# Patient Record
Sex: FEMALE | Race: WHITE | NOT HISPANIC OR LATINO
[De-identification: names, ages, dates, MRNs, and addresses within clinical notes are randomized per-mention and may not be internally consistent; named-entity substitution may affect disease eponyms.]

---

## 2015-10-28 RX ORDER — ISOSORBIDE MONONITRATE 60 MG/1
1 TABLET, EXTENDED RELEASE ORAL
Qty: 0 | Refills: 0 | DISCHARGE
Start: 2015-10-28

## 2018-04-11 ENCOUNTER — NON-APPOINTMENT (OUTPATIENT)
Age: 78
End: 2018-04-11

## 2018-04-11 ENCOUNTER — LABORATORY RESULT (OUTPATIENT)
Age: 78
End: 2018-04-11

## 2018-04-11 ENCOUNTER — APPOINTMENT (OUTPATIENT)
Dept: INTERNAL MEDICINE | Facility: CLINIC | Age: 78
End: 2018-04-11
Payer: MEDICARE

## 2018-04-11 VITALS
RESPIRATION RATE: 14 BRPM | TEMPERATURE: 97.5 F | HEART RATE: 70 BPM | WEIGHT: 207 LBS | DIASTOLIC BLOOD PRESSURE: 87 MMHG | OXYGEN SATURATION: 97 % | HEIGHT: 61 IN | SYSTOLIC BLOOD PRESSURE: 150 MMHG | BODY MASS INDEX: 39.08 KG/M2

## 2018-04-11 VITALS — DIASTOLIC BLOOD PRESSURE: 90 MMHG | SYSTOLIC BLOOD PRESSURE: 150 MMHG

## 2018-04-11 PROCEDURE — 93000 ELECTROCARDIOGRAM COMPLETE: CPT

## 2018-04-11 PROCEDURE — 99215 OFFICE O/P EST HI 40 MIN: CPT

## 2018-04-11 RX ORDER — IBUPROFEN 600 MG/1
600 TABLET ORAL
Qty: 90 | Refills: 0 | Status: COMPLETED | COMMUNITY
Start: 2018-02-15

## 2018-04-11 RX ORDER — SULFAMETHOXAZOLE AND TRIMETHOPRIM 800; 160 MG/1; MG/1
800-160 TABLET ORAL
Qty: 20 | Refills: 0 | Status: DISCONTINUED | COMMUNITY
Start: 2018-02-07

## 2018-04-11 RX ORDER — LEVALBUTEROL TARTRATE 45 UG/1
45 AEROSOL, METERED ORAL
Qty: 15 | Refills: 0 | Status: DISCONTINUED | COMMUNITY
Start: 2018-03-05

## 2018-04-11 RX ORDER — LEVOFLOXACIN 500 MG/1
500 TABLET, FILM COATED ORAL
Qty: 7 | Refills: 0 | Status: DISCONTINUED | COMMUNITY
Start: 2018-02-08

## 2018-04-11 RX ORDER — FAMOTIDINE 20 MG/1
20 TABLET, FILM COATED ORAL
Qty: 60 | Refills: 0 | Status: COMPLETED | COMMUNITY
Start: 2018-03-05

## 2018-04-11 RX ORDER — OXYCODONE AND ACETAMINOPHEN 7.5; 325 MG/1; MG/1
7.5-325 TABLET ORAL
Qty: 3 | Refills: 0 | Status: DISCONTINUED | COMMUNITY
Start: 2018-02-28

## 2018-04-11 RX ORDER — HYDRALAZINE HYDROCHLORIDE 50 MG/1
50 TABLET ORAL
Qty: 60 | Refills: 0 | Status: DISCONTINUED | COMMUNITY
Start: 2018-02-27

## 2018-04-11 RX ORDER — ALPRAZOLAM 0.25 MG/1
0.25 TABLET ORAL
Qty: 60 | Refills: 0 | Status: COMPLETED | COMMUNITY
Start: 2018-03-26

## 2018-04-11 RX ORDER — ACARBOSE 25 MG/1
25 TABLET ORAL
Qty: 90 | Refills: 0 | Status: COMPLETED | COMMUNITY
Start: 2018-03-26

## 2018-04-12 ENCOUNTER — APPOINTMENT (OUTPATIENT)
Dept: CARDIOLOGY | Facility: CLINIC | Age: 78
End: 2018-04-12
Payer: MEDICARE

## 2018-04-12 VITALS
HEIGHT: 61 IN | OXYGEN SATURATION: 96 % | WEIGHT: 206 LBS | HEART RATE: 79 BPM | DIASTOLIC BLOOD PRESSURE: 79 MMHG | RESPIRATION RATE: 14 BRPM | BODY MASS INDEX: 38.89 KG/M2 | SYSTOLIC BLOOD PRESSURE: 152 MMHG

## 2018-04-12 LAB
25(OH)D3 SERPL-MCNC: 48.7 NG/ML
ALBUMIN SERPL ELPH-MCNC: 3.9 G/DL
ALP BLD-CCNC: 61 U/L
ALT SERPL-CCNC: 18 U/L
ANION GAP SERPL CALC-SCNC: 17 MMOL/L
APPEARANCE: CLEAR
AST SERPL-CCNC: 21 U/L
BASOPHILS # BLD AUTO: 0.02 K/UL
BASOPHILS NFR BLD AUTO: 0.2 %
BILIRUB SERPL-MCNC: 0.2 MG/DL
BILIRUBIN URINE: NEGATIVE
BLOOD URINE: NEGATIVE
BUN SERPL-MCNC: 27 MG/DL
CALCIUM SERPL-MCNC: 9.8 MG/DL
CHLORIDE SERPL-SCNC: 101 MMOL/L
CHOLEST SERPL-MCNC: 158 MG/DL
CHOLEST/HDLC SERPL: 3.3 RATIO
CO2 SERPL-SCNC: 25 MMOL/L
COLOR: YELLOW
CREAT SERPL-MCNC: 1.85 MG/DL
CREAT SPEC-SCNC: 115 MG/DL
EOSINOPHIL # BLD AUTO: 0.05 K/UL
EOSINOPHIL NFR BLD AUTO: 0.6 %
FERRITIN SERPL-MCNC: 44 NG/ML
FOLATE SERPL-MCNC: 12.7 NG/ML
GLUCOSE QUALITATIVE U: NEGATIVE MG/DL
GLUCOSE SERPL-MCNC: 153 MG/DL
HBA1C MFR BLD HPLC: 8 %
HCT VFR BLD CALC: 39.7 %
HDLC SERPL-MCNC: 48 MG/DL
HGB BLD-MCNC: 11.9 G/DL
IMM GRANULOCYTES NFR BLD AUTO: 0.1 %
KETONES URINE: NEGATIVE
LDLC SERPL CALC-MCNC: 70 MG/DL
LEUKOCYTE ESTERASE URINE: NEGATIVE
LYMPHOCYTES # BLD AUTO: 1.46 K/UL
LYMPHOCYTES NFR BLD AUTO: 17.8 %
MAGNESIUM SERPL-MCNC: 2.1 MG/DL
MAN DIFF?: NORMAL
MCHC RBC-ENTMCNC: 30 GM/DL
MCHC RBC-ENTMCNC: 30.7 PG
MCV RBC AUTO: 102.3 FL
MICROALBUMIN 24H UR DL<=1MG/L-MCNC: 8.7 MG/DL
MICROALBUMIN/CREAT 24H UR-RTO: 76 MG/G
MONOCYTES # BLD AUTO: 0.74 K/UL
MONOCYTES NFR BLD AUTO: 9 %
NEUTROPHILS # BLD AUTO: 5.94 K/UL
NEUTROPHILS NFR BLD AUTO: 72.3 %
NITRITE URINE: NEGATIVE
PH URINE: 6
PLATELET # BLD AUTO: 334 K/UL
POTASSIUM SERPL-SCNC: 4.1 MMOL/L
PROT SERPL-MCNC: 7.4 G/DL
PROTEIN URINE: 30 MG/DL
RBC # BLD: 3.88 M/UL
RBC # FLD: 15.4 %
SODIUM SERPL-SCNC: 143 MMOL/L
SPECIFIC GRAVITY URINE: 1.02
TRIGL SERPL-MCNC: 200 MG/DL
TSH SERPL-ACNC: 1.67 UIU/ML
URATE SERPL-MCNC: 5.7 MG/DL
UROBILINOGEN URINE: NEGATIVE MG/DL
VIT B12 SERPL-MCNC: 767 PG/ML
WBC # FLD AUTO: 8.22 K/UL

## 2018-04-12 PROCEDURE — 99214 OFFICE O/P EST MOD 30 MIN: CPT | Mod: 25

## 2018-04-13 ENCOUNTER — NON-APPOINTMENT (OUTPATIENT)
Age: 78
End: 2018-04-13

## 2018-04-26 ENCOUNTER — NON-APPOINTMENT (OUTPATIENT)
Age: 78
End: 2018-04-26

## 2018-04-26 ENCOUNTER — APPOINTMENT (OUTPATIENT)
Dept: INTERNAL MEDICINE | Facility: CLINIC | Age: 78
End: 2018-04-26
Payer: MEDICARE

## 2018-04-26 ENCOUNTER — APPOINTMENT (OUTPATIENT)
Dept: CARDIOLOGY | Facility: CLINIC | Age: 78
End: 2018-04-26
Payer: MEDICARE

## 2018-04-26 VITALS
SYSTOLIC BLOOD PRESSURE: 128 MMHG | WEIGHT: 199 LBS | HEART RATE: 66 BPM | DIASTOLIC BLOOD PRESSURE: 73 MMHG | OXYGEN SATURATION: 96 % | TEMPERATURE: 97.8 F | HEIGHT: 61 IN | BODY MASS INDEX: 37.57 KG/M2 | RESPIRATION RATE: 13 BRPM

## 2018-04-26 DIAGNOSIS — Z87.09 PERSONAL HISTORY OF OTHER DISEASES OF THE RESPIRATORY SYSTEM: ICD-10-CM

## 2018-04-26 DIAGNOSIS — J22 UNSPECIFIED ACUTE LOWER RESPIRATORY INFECTION: ICD-10-CM

## 2018-04-26 PROCEDURE — 93000 ELECTROCARDIOGRAM COMPLETE: CPT

## 2018-04-26 PROCEDURE — 94640 AIRWAY INHALATION TREATMENT: CPT

## 2018-04-26 PROCEDURE — 99215 OFFICE O/P EST HI 40 MIN: CPT | Mod: 25

## 2018-04-26 PROCEDURE — 99214 OFFICE O/P EST MOD 30 MIN: CPT | Mod: 25

## 2018-04-26 RX ORDER — NEBULIZER ACCESSORIES
KIT MISCELLANEOUS
Qty: 1 | Refills: 0 | Status: ACTIVE | COMMUNITY
Start: 2018-04-26 | End: 1900-01-01

## 2018-04-27 LAB — RAPID RVP RESULT: NOT DETECTED

## 2018-05-11 ENCOUNTER — APPOINTMENT (OUTPATIENT)
Dept: INTERNAL MEDICINE | Facility: CLINIC | Age: 78
End: 2018-05-11
Payer: MEDICARE

## 2018-05-11 VITALS
SYSTOLIC BLOOD PRESSURE: 162 MMHG | BODY MASS INDEX: 38.51 KG/M2 | WEIGHT: 204 LBS | HEIGHT: 61 IN | TEMPERATURE: 97.8 F | DIASTOLIC BLOOD PRESSURE: 84 MMHG | RESPIRATION RATE: 14 BRPM | OXYGEN SATURATION: 94 % | HEART RATE: 65 BPM

## 2018-05-11 VITALS — DIASTOLIC BLOOD PRESSURE: 85 MMHG | SYSTOLIC BLOOD PRESSURE: 160 MMHG

## 2018-05-11 PROCEDURE — 99215 OFFICE O/P EST HI 40 MIN: CPT

## 2018-05-11 RX ORDER — IBUPROFEN 400 MG/1
400 TABLET ORAL
Qty: 60 | Refills: 0 | Status: DISCONTINUED | COMMUNITY
Start: 2018-05-02

## 2018-05-11 RX ORDER — LEVOFLOXACIN 500 MG/1
500 TABLET, FILM COATED ORAL DAILY
Qty: 7 | Refills: 1 | Status: DISCONTINUED | COMMUNITY
Start: 2018-04-26 | End: 2018-05-11

## 2018-05-24 ENCOUNTER — APPOINTMENT (OUTPATIENT)
Dept: CARDIOLOGY | Facility: CLINIC | Age: 78
End: 2018-05-24
Payer: MEDICARE

## 2018-05-24 ENCOUNTER — APPOINTMENT (OUTPATIENT)
Dept: INTERNAL MEDICINE | Facility: CLINIC | Age: 78
End: 2018-05-24
Payer: MEDICARE

## 2018-05-24 ENCOUNTER — NON-APPOINTMENT (OUTPATIENT)
Age: 78
End: 2018-05-24

## 2018-05-24 VITALS
DIASTOLIC BLOOD PRESSURE: 74 MMHG | HEART RATE: 58 BPM | WEIGHT: 204 LBS | SYSTOLIC BLOOD PRESSURE: 124 MMHG | RESPIRATION RATE: 13 BRPM | OXYGEN SATURATION: 93 % | TEMPERATURE: 98.2 F | BODY MASS INDEX: 38.51 KG/M2 | HEIGHT: 61 IN

## 2018-05-24 DIAGNOSIS — M17.10 UNILATERAL PRIMARY OSTEOARTHRITIS, UNSPECIFIED KNEE: ICD-10-CM

## 2018-05-24 PROCEDURE — 93000 ELECTROCARDIOGRAM COMPLETE: CPT

## 2018-05-24 PROCEDURE — 99214 OFFICE O/P EST MOD 30 MIN: CPT | Mod: 25

## 2018-05-24 PROCEDURE — 93306 TTE W/DOPPLER COMPLETE: CPT

## 2018-05-24 PROCEDURE — 99215 OFFICE O/P EST HI 40 MIN: CPT

## 2018-05-24 RX ORDER — ISOSORBIDE MONONITRATE 30 MG/1
30 TABLET, EXTENDED RELEASE ORAL
Qty: 30 | Refills: 0 | Status: DISCONTINUED | COMMUNITY
Start: 2018-04-17

## 2018-05-24 NOTE — REVIEW OF SYSTEMS
[Joint Pain] : joint pain [Negative] : Heme/Lymph [FreeTextEntry9] : S/P RIGHT TKR, SWELLING, WARMTH

## 2018-05-27 ENCOUNTER — NON-APPOINTMENT (OUTPATIENT)
Age: 78
End: 2018-05-27

## 2018-05-27 LAB
ANION GAP SERPL CALC-SCNC: 16 MMOL/L
BUN SERPL-MCNC: 42 MG/DL
CALCIUM SERPL-MCNC: 9.6 MG/DL
CHLORIDE SERPL-SCNC: 100 MMOL/L
CO2 SERPL-SCNC: 25 MMOL/L
CREAT SERPL-MCNC: 2.03 MG/DL
GLUCOSE SERPL-MCNC: 139 MG/DL
HBA1C MFR BLD HPLC: 7 %
MAGNESIUM SERPL-MCNC: 2.1 MG/DL
POTASSIUM SERPL-SCNC: 3.4 MMOL/L
SODIUM SERPL-SCNC: 141 MMOL/L
URATE SERPL-MCNC: 9.1 MG/DL

## 2018-06-12 LAB
BASOPHILS # BLD AUTO: 0.01 K/UL
BASOPHILS NFR BLD AUTO: 0.1 %
EOSINOPHIL # BLD AUTO: 0.09 K/UL
EOSINOPHIL NFR BLD AUTO: 1 %
HCT VFR BLD CALC: 35.3 %
HGB BLD-MCNC: 11 G/DL
IMM GRANULOCYTES NFR BLD AUTO: 0.1 %
LYMPHOCYTES # BLD AUTO: 1.64 K/UL
LYMPHOCYTES NFR BLD AUTO: 18.1 %
MAN DIFF?: NORMAL
MCHC RBC-ENTMCNC: 30 PG
MCHC RBC-ENTMCNC: 31.2 GM/DL
MCV RBC AUTO: 96.2 FL
MONOCYTES # BLD AUTO: 0.52 K/UL
MONOCYTES NFR BLD AUTO: 5.7 %
NEUTROPHILS # BLD AUTO: 6.79 K/UL
NEUTROPHILS NFR BLD AUTO: 75 %
PLATELET # BLD AUTO: 283 K/UL
RBC # BLD: 3.67 M/UL
RBC # FLD: 16 %
WBC # FLD AUTO: 9.06 K/UL

## 2018-07-09 ENCOUNTER — APPOINTMENT (OUTPATIENT)
Dept: INTERNAL MEDICINE | Facility: CLINIC | Age: 78
End: 2018-07-09
Payer: MEDICARE

## 2018-07-09 VITALS
RESPIRATION RATE: 13 BRPM | HEIGHT: 61 IN | OXYGEN SATURATION: 93 % | DIASTOLIC BLOOD PRESSURE: 69 MMHG | HEART RATE: 65 BPM | SYSTOLIC BLOOD PRESSURE: 170 MMHG | TEMPERATURE: 98.2 F

## 2018-07-09 VITALS — DIASTOLIC BLOOD PRESSURE: 70 MMHG | SYSTOLIC BLOOD PRESSURE: 145 MMHG

## 2018-07-09 PROCEDURE — 99215 OFFICE O/P EST HI 40 MIN: CPT

## 2018-07-09 NOTE — REVIEW OF SYSTEMS
[Joint Pain] : joint pain [Joint Stiffness] : joint stiffness [Unsteady Walking] : ataxia [Negative] : Heme/Lymph [Joint Swelling] : no joint swelling [Muscle Weakness] : no muscle weakness [Muscle Pain] : no muscle pain [Back Pain] : no back pain

## 2018-07-09 NOTE — PHYSICAL EXAM

## 2018-07-18 ENCOUNTER — MEDICATION RENEWAL (OUTPATIENT)
Age: 78
End: 2018-07-18

## 2018-07-19 ENCOUNTER — MEDICATION RENEWAL (OUTPATIENT)
Age: 78
End: 2018-07-19

## 2018-07-23 ENCOUNTER — RX RENEWAL (OUTPATIENT)
Age: 78
End: 2018-07-23

## 2018-07-28 ENCOUNTER — APPOINTMENT (OUTPATIENT)
Dept: ORTHOPEDIC SURGERY | Facility: CLINIC | Age: 78
End: 2018-07-28
Payer: MEDICARE

## 2018-07-28 ENCOUNTER — RX RENEWAL (OUTPATIENT)
Age: 78
End: 2018-07-28

## 2018-07-28 VITALS
HEIGHT: 61 IN | DIASTOLIC BLOOD PRESSURE: 82 MMHG | SYSTOLIC BLOOD PRESSURE: 181 MMHG | BODY MASS INDEX: 37.76 KG/M2 | WEIGHT: 200 LBS | HEART RATE: 64 BPM

## 2018-07-28 PROCEDURE — 99213 OFFICE O/P EST LOW 20 MIN: CPT

## 2018-07-28 PROCEDURE — 73502 X-RAY EXAM HIP UNI 2-3 VIEWS: CPT

## 2018-07-28 PROCEDURE — 72110 X-RAY EXAM L-2 SPINE 4/>VWS: CPT

## 2018-08-03 ENCOUNTER — RX RENEWAL (OUTPATIENT)
Age: 78
End: 2018-08-03

## 2018-08-08 ENCOUNTER — RX RENEWAL (OUTPATIENT)
Age: 78
End: 2018-08-08

## 2018-09-12 ENCOUNTER — NON-APPOINTMENT (OUTPATIENT)
Age: 78
End: 2018-09-12

## 2018-09-12 ENCOUNTER — APPOINTMENT (OUTPATIENT)
Dept: CARDIOLOGY | Facility: CLINIC | Age: 78
End: 2018-09-12
Payer: MEDICARE

## 2018-09-12 ENCOUNTER — APPOINTMENT (OUTPATIENT)
Dept: INTERNAL MEDICINE | Facility: CLINIC | Age: 78
End: 2018-09-12
Payer: MEDICARE

## 2018-09-12 VITALS
BODY MASS INDEX: 37 KG/M2 | WEIGHT: 196 LBS | HEART RATE: 68 BPM | DIASTOLIC BLOOD PRESSURE: 80 MMHG | TEMPERATURE: 98.3 F | HEIGHT: 61 IN | OXYGEN SATURATION: 95 % | SYSTOLIC BLOOD PRESSURE: 161 MMHG | RESPIRATION RATE: 14 BRPM

## 2018-09-12 VITALS — SYSTOLIC BLOOD PRESSURE: 132 MMHG | HEART RATE: 66 BPM | DIASTOLIC BLOOD PRESSURE: 64 MMHG

## 2018-09-12 PROCEDURE — 99214 OFFICE O/P EST MOD 30 MIN: CPT | Mod: 25

## 2018-09-12 PROCEDURE — G0008: CPT

## 2018-09-12 PROCEDURE — 93000 ELECTROCARDIOGRAM COMPLETE: CPT

## 2018-09-12 PROCEDURE — 90686 IIV4 VACC NO PRSV 0.5 ML IM: CPT

## 2018-09-12 NOTE — REVIEW OF SYSTEMS
[Joint Pain] : joint pain [Joint Stiffness] : joint stiffness [Unsteady Walking] : ataxia [Negative] : Heme/Lymph [Joint Swelling] : no joint swelling [Muscle Pain] : no muscle pain [Back Pain] : no back pain [Headache] : no headache [Dizziness] : no dizziness [Fainting] : no fainting [Confusion] : no confusion [Memory Loss] : no memory loss

## 2018-09-12 NOTE — PHYSICAL EXAM

## 2018-09-13 LAB
ALBUMIN SERPL ELPH-MCNC: 4.3 G/DL
ALP BLD-CCNC: 62 U/L
ALT SERPL-CCNC: 12 U/L
ANION GAP SERPL CALC-SCNC: 15 MMOL/L
AST SERPL-CCNC: 21 U/L
BASOPHILS # BLD AUTO: 0.01 K/UL
BASOPHILS NFR BLD AUTO: 0.1 %
BILIRUB SERPL-MCNC: 0.4 MG/DL
BUN SERPL-MCNC: 26 MG/DL
CALCIUM SERPL-MCNC: 9.9 MG/DL
CHLORIDE SERPL-SCNC: 98 MMOL/L
CHOLEST SERPL-MCNC: 152 MG/DL
CHOLEST/HDLC SERPL: 2.9 RATIO
CO2 SERPL-SCNC: 28 MMOL/L
CREAT SERPL-MCNC: 1.67 MG/DL
EOSINOPHIL # BLD AUTO: 0.07 K/UL
EOSINOPHIL NFR BLD AUTO: 0.7 %
GLUCOSE SERPL-MCNC: 148 MG/DL
HBA1C MFR BLD HPLC: 6.7 %
HCT VFR BLD CALC: 40.8 %
HDLC SERPL-MCNC: 53 MG/DL
HGB BLD-MCNC: 12.4 G/DL
IMM GRANULOCYTES NFR BLD AUTO: 0.2 %
LDLC SERPL CALC-MCNC: 69 MG/DL
LYMPHOCYTES # BLD AUTO: 1.57 K/UL
LYMPHOCYTES NFR BLD AUTO: 16 %
MAGNESIUM SERPL-MCNC: 2.1 MG/DL
MAN DIFF?: NORMAL
MCHC RBC-ENTMCNC: 28.6 PG
MCHC RBC-ENTMCNC: 30.4 GM/DL
MCV RBC AUTO: 94.2 FL
MONOCYTES # BLD AUTO: 0.59 K/UL
MONOCYTES NFR BLD AUTO: 6 %
NEUTROPHILS # BLD AUTO: 7.53 K/UL
NEUTROPHILS NFR BLD AUTO: 77 %
PLATELET # BLD AUTO: 344 K/UL
POTASSIUM SERPL-SCNC: 3.4 MMOL/L
PROT SERPL-MCNC: 8.1 G/DL
RBC # BLD: 4.33 M/UL
RBC # FLD: 15.8 %
SODIUM SERPL-SCNC: 141 MMOL/L
TRIGL SERPL-MCNC: 151 MG/DL
TSH SERPL-ACNC: 1.05 UIU/ML
URATE SERPL-MCNC: 7.1 MG/DL
WBC # FLD AUTO: 9.79 K/UL

## 2018-10-28 ENCOUNTER — RX RENEWAL (OUTPATIENT)
Age: 78
End: 2018-10-28

## 2018-11-26 ENCOUNTER — MEDICATION RENEWAL (OUTPATIENT)
Age: 78
End: 2018-11-26

## 2018-12-08 ENCOUNTER — INPATIENT (INPATIENT)
Facility: HOSPITAL | Age: 78
LOS: 3 days | Discharge: NURSING, REHAB OR LONG-TERM CARE | DRG: 291 | End: 2018-12-12
Attending: HOSPITALIST | Admitting: HOSPITALIST
Payer: MEDICARE

## 2018-12-08 VITALS
HEIGHT: 61 IN | RESPIRATION RATE: 20 BRPM | OXYGEN SATURATION: 97 % | WEIGHT: 194.01 LBS | TEMPERATURE: 99 F | SYSTOLIC BLOOD PRESSURE: 149 MMHG | HEART RATE: 60 BPM | DIASTOLIC BLOOD PRESSURE: 56 MMHG

## 2018-12-08 DIAGNOSIS — I50.9 HEART FAILURE, UNSPECIFIED: ICD-10-CM

## 2018-12-08 DIAGNOSIS — Z93.2 ILEOSTOMY STATUS: Chronic | ICD-10-CM

## 2018-12-08 DIAGNOSIS — N18.9 CHRONIC KIDNEY DISEASE, UNSPECIFIED: ICD-10-CM

## 2018-12-08 DIAGNOSIS — E11.9 TYPE 2 DIABETES MELLITUS WITHOUT COMPLICATIONS: ICD-10-CM

## 2018-12-08 DIAGNOSIS — Z29.9 ENCOUNTER FOR PROPHYLACTIC MEASURES, UNSPECIFIED: ICD-10-CM

## 2018-12-08 DIAGNOSIS — Z98.89 OTHER SPECIFIED POSTPROCEDURAL STATES: Chronic | ICD-10-CM

## 2018-12-08 DIAGNOSIS — N18.3 CHRONIC KIDNEY DISEASE, STAGE 3 (MODERATE): ICD-10-CM

## 2018-12-08 DIAGNOSIS — I50.33 ACUTE ON CHRONIC DIASTOLIC (CONGESTIVE) HEART FAILURE: ICD-10-CM

## 2018-12-08 DIAGNOSIS — Z90.11 ACQUIRED ABSENCE OF RIGHT BREAST AND NIPPLE: Chronic | ICD-10-CM

## 2018-12-08 DIAGNOSIS — I10 ESSENTIAL (PRIMARY) HYPERTENSION: ICD-10-CM

## 2018-12-08 DIAGNOSIS — M25.559 PAIN IN UNSPECIFIED HIP: ICD-10-CM

## 2018-12-08 LAB
ALBUMIN SERPL ELPH-MCNC: 3.7 G/DL — SIGNIFICANT CHANGE UP (ref 3.3–5)
ALP SERPL-CCNC: 63 U/L — SIGNIFICANT CHANGE UP (ref 40–120)
ALT FLD-CCNC: 12 U/L — SIGNIFICANT CHANGE UP (ref 10–45)
ANION GAP SERPL CALC-SCNC: 12 MMOL/L — SIGNIFICANT CHANGE UP (ref 5–17)
APTT BLD: 28.5 SEC — SIGNIFICANT CHANGE UP (ref 27.5–36.3)
AST SERPL-CCNC: 28 U/L — SIGNIFICANT CHANGE UP (ref 10–40)
BASE EXCESS BLDV CALC-SCNC: 4.7 MMOL/L — HIGH (ref -2–2)
BASOPHILS # BLD AUTO: 0 K/UL — SIGNIFICANT CHANGE UP (ref 0–0.2)
BASOPHILS NFR BLD AUTO: 0.1 % — SIGNIFICANT CHANGE UP (ref 0–2)
BILIRUB SERPL-MCNC: 0.3 MG/DL — SIGNIFICANT CHANGE UP (ref 0.2–1.2)
BUN SERPL-MCNC: 30 MG/DL — HIGH (ref 7–23)
CA-I SERPL-SCNC: 1.1 MMOL/L — LOW (ref 1.12–1.3)
CALCIUM SERPL-MCNC: 9.6 MG/DL — SIGNIFICANT CHANGE UP (ref 8.4–10.5)
CHLORIDE BLDV-SCNC: 103 MMOL/L — SIGNIFICANT CHANGE UP (ref 96–108)
CHLORIDE SERPL-SCNC: 98 MMOL/L — SIGNIFICANT CHANGE UP (ref 96–108)
CO2 BLDV-SCNC: 31 MMOL/L — HIGH (ref 22–30)
CO2 SERPL-SCNC: 24 MMOL/L — SIGNIFICANT CHANGE UP (ref 22–31)
CREAT SERPL-MCNC: 1.54 MG/DL — HIGH (ref 0.5–1.3)
EOSINOPHIL # BLD AUTO: 0.1 K/UL — SIGNIFICANT CHANGE UP (ref 0–0.5)
EOSINOPHIL NFR BLD AUTO: 0.7 % — SIGNIFICANT CHANGE UP (ref 0–6)
GAS PNL BLDV: 133 MMOL/L — LOW (ref 136–145)
GAS PNL BLDV: SIGNIFICANT CHANGE UP
GLUCOSE BLDC GLUCOMTR-MCNC: 224 MG/DL — HIGH (ref 70–99)
GLUCOSE BLDV-MCNC: 163 MG/DL — HIGH (ref 70–99)
GLUCOSE SERPL-MCNC: 176 MG/DL — HIGH (ref 70–99)
HCO3 BLDV-SCNC: 30 MMOL/L — HIGH (ref 21–29)
HCT VFR BLD CALC: 40.3 % — SIGNIFICANT CHANGE UP (ref 34.5–45)
HCT VFR BLDA CALC: 38 % — LOW (ref 39–50)
HGB BLD CALC-MCNC: 12.3 G/DL — SIGNIFICANT CHANGE UP (ref 11.5–15.5)
HGB BLD-MCNC: 12.8 G/DL — SIGNIFICANT CHANGE UP (ref 11.5–15.5)
INR BLD: 0.94 RATIO — SIGNIFICANT CHANGE UP (ref 0.88–1.16)
LACTATE BLDV-MCNC: 1.7 MMOL/L — SIGNIFICANT CHANGE UP (ref 0.7–2)
LYMPHOCYTES # BLD AUTO: 1.2 K/UL — SIGNIFICANT CHANGE UP (ref 1–3.3)
LYMPHOCYTES # BLD AUTO: 12.3 % — LOW (ref 13–44)
MCHC RBC-ENTMCNC: 29.3 PG — SIGNIFICANT CHANGE UP (ref 27–34)
MCHC RBC-ENTMCNC: 31.6 GM/DL — LOW (ref 32–36)
MCV RBC AUTO: 92.6 FL — SIGNIFICANT CHANGE UP (ref 80–100)
MONOCYTES # BLD AUTO: 0.9 K/UL — SIGNIFICANT CHANGE UP (ref 0–0.9)
MONOCYTES NFR BLD AUTO: 9.5 % — SIGNIFICANT CHANGE UP (ref 2–14)
NEUTROPHILS # BLD AUTO: 7.3 K/UL — SIGNIFICANT CHANGE UP (ref 1.8–7.4)
NEUTROPHILS NFR BLD AUTO: 77.5 % — HIGH (ref 43–77)
NT-PROBNP SERPL-SCNC: 1584 PG/ML — HIGH (ref 0–300)
PCO2 BLDV: 48 MMHG — SIGNIFICANT CHANGE UP (ref 35–50)
PH BLDV: 7.41 — SIGNIFICANT CHANGE UP (ref 7.35–7.45)
PLATELET # BLD AUTO: 297 K/UL — SIGNIFICANT CHANGE UP (ref 150–400)
PO2 BLDV: 35 MMHG — SIGNIFICANT CHANGE UP (ref 25–45)
POTASSIUM BLDV-SCNC: 7 MMOL/L — CRITICAL HIGH (ref 3.5–5.3)
POTASSIUM SERPL-MCNC: 5 MMOL/L — SIGNIFICANT CHANGE UP (ref 3.5–5.3)
POTASSIUM SERPL-SCNC: 5 MMOL/L — SIGNIFICANT CHANGE UP (ref 3.5–5.3)
PROT SERPL-MCNC: 7.4 G/DL — SIGNIFICANT CHANGE UP (ref 6–8.3)
PROTHROM AB SERPL-ACNC: 10.8 SEC — SIGNIFICANT CHANGE UP (ref 10–12.9)
RBC # BLD: 4.36 M/UL — SIGNIFICANT CHANGE UP (ref 3.8–5.2)
RBC # FLD: 14.5 % — SIGNIFICANT CHANGE UP (ref 10.3–14.5)
SAO2 % BLDV: 63 % — LOW (ref 67–88)
SODIUM SERPL-SCNC: 134 MMOL/L — LOW (ref 135–145)
WBC # BLD: 9.4 K/UL — SIGNIFICANT CHANGE UP (ref 3.8–10.5)
WBC # FLD AUTO: 9.4 K/UL — SIGNIFICANT CHANGE UP (ref 3.8–10.5)

## 2018-12-08 PROCEDURE — 93010 ELECTROCARDIOGRAM REPORT: CPT

## 2018-12-08 PROCEDURE — 99223 1ST HOSP IP/OBS HIGH 75: CPT | Mod: GC

## 2018-12-08 PROCEDURE — 71045 X-RAY EXAM CHEST 1 VIEW: CPT | Mod: 26

## 2018-12-08 PROCEDURE — 99284 EMERGENCY DEPT VISIT MOD MDM: CPT | Mod: 25

## 2018-12-08 RX ORDER — DEXTROSE 50 % IN WATER 50 %
15 SYRINGE (ML) INTRAVENOUS ONCE
Qty: 0 | Refills: 0 | Status: DISCONTINUED | OUTPATIENT
Start: 2018-12-08 | End: 2018-12-09

## 2018-12-08 RX ORDER — INSULIN LISPRO 100/ML
VIAL (ML) SUBCUTANEOUS AT BEDTIME
Qty: 0 | Refills: 0 | Status: DISCONTINUED | OUTPATIENT
Start: 2018-12-08 | End: 2018-12-12

## 2018-12-08 RX ORDER — HEPARIN SODIUM 5000 [USP'U]/ML
5000 INJECTION INTRAVENOUS; SUBCUTANEOUS EVERY 8 HOURS
Qty: 0 | Refills: 0 | Status: DISCONTINUED | OUTPATIENT
Start: 2018-12-08 | End: 2018-12-12

## 2018-12-08 RX ORDER — DEXTROSE 50 % IN WATER 50 %
12.5 SYRINGE (ML) INTRAVENOUS ONCE
Qty: 0 | Refills: 0 | Status: DISCONTINUED | OUTPATIENT
Start: 2018-12-08 | End: 2018-12-09

## 2018-12-08 RX ORDER — SENNA PLUS 8.6 MG/1
2 TABLET ORAL AT BEDTIME
Qty: 0 | Refills: 0 | Status: DISCONTINUED | OUTPATIENT
Start: 2018-12-08 | End: 2018-12-12

## 2018-12-08 RX ORDER — DOCUSATE SODIUM 100 MG
100 CAPSULE ORAL THREE TIMES A DAY
Qty: 0 | Refills: 0 | Status: DISCONTINUED | OUTPATIENT
Start: 2018-12-08 | End: 2018-12-12

## 2018-12-08 RX ORDER — FUROSEMIDE 40 MG
20 TABLET ORAL EVERY 12 HOURS
Qty: 0 | Refills: 0 | Status: DISCONTINUED | OUTPATIENT
Start: 2018-12-08 | End: 2018-12-10

## 2018-12-08 RX ORDER — OXYCODONE HYDROCHLORIDE 5 MG/1
10 TABLET ORAL EVERY 6 HOURS
Qty: 0 | Refills: 0 | Status: DISCONTINUED | OUTPATIENT
Start: 2018-12-08 | End: 2018-12-12

## 2018-12-08 RX ORDER — INSULIN LISPRO 100/ML
VIAL (ML) SUBCUTANEOUS
Qty: 0 | Refills: 0 | Status: DISCONTINUED | OUTPATIENT
Start: 2018-12-08 | End: 2018-12-12

## 2018-12-08 RX ORDER — GABAPENTIN 400 MG/1
100 CAPSULE ORAL
Qty: 0 | Refills: 0 | Status: DISCONTINUED | OUTPATIENT
Start: 2018-12-08 | End: 2018-12-12

## 2018-12-08 RX ORDER — SODIUM CHLORIDE 9 MG/ML
1000 INJECTION, SOLUTION INTRAVENOUS
Qty: 0 | Refills: 0 | Status: DISCONTINUED | OUTPATIENT
Start: 2018-12-08 | End: 2018-12-09

## 2018-12-08 RX ORDER — GLUCAGON INJECTION, SOLUTION 0.5 MG/.1ML
1 INJECTION, SOLUTION SUBCUTANEOUS ONCE
Qty: 0 | Refills: 0 | Status: DISCONTINUED | OUTPATIENT
Start: 2018-12-08 | End: 2018-12-09

## 2018-12-08 RX ORDER — MORPHINE SULFATE 50 MG/1
2 CAPSULE, EXTENDED RELEASE ORAL ONCE
Qty: 0 | Refills: 0 | Status: DISCONTINUED | OUTPATIENT
Start: 2018-12-08 | End: 2018-12-08

## 2018-12-08 RX ORDER — METOPROLOL TARTRATE 50 MG
12.5 TABLET ORAL
Qty: 0 | Refills: 0 | Status: DISCONTINUED | OUTPATIENT
Start: 2018-12-08 | End: 2018-12-11

## 2018-12-08 RX ORDER — CHOLECALCIFEROL (VITAMIN D3) 125 MCG
1000 CAPSULE ORAL DAILY
Qty: 0 | Refills: 0 | Status: DISCONTINUED | OUTPATIENT
Start: 2018-12-08 | End: 2018-12-12

## 2018-12-08 RX ORDER — DEXTROSE 50 % IN WATER 50 %
25 SYRINGE (ML) INTRAVENOUS ONCE
Qty: 0 | Refills: 0 | Status: DISCONTINUED | OUTPATIENT
Start: 2018-12-08 | End: 2018-12-09

## 2018-12-08 RX ORDER — HYDRALAZINE HCL 50 MG
100 TABLET ORAL EVERY 12 HOURS
Qty: 0 | Refills: 0 | Status: DISCONTINUED | OUTPATIENT
Start: 2018-12-08 | End: 2018-12-12

## 2018-12-08 RX ORDER — ATORVASTATIN CALCIUM 80 MG/1
20 TABLET, FILM COATED ORAL AT BEDTIME
Qty: 0 | Refills: 0 | Status: DISCONTINUED | OUTPATIENT
Start: 2018-12-08 | End: 2018-12-12

## 2018-12-08 RX ORDER — FUROSEMIDE 40 MG
20 TABLET ORAL ONCE
Qty: 0 | Refills: 0 | Status: COMPLETED | OUTPATIENT
Start: 2018-12-08 | End: 2018-12-08

## 2018-12-08 RX ORDER — ISOSORBIDE MONONITRATE 60 MG/1
60 TABLET, EXTENDED RELEASE ORAL DAILY
Qty: 0 | Refills: 0 | Status: DISCONTINUED | OUTPATIENT
Start: 2018-12-08 | End: 2018-12-12

## 2018-12-08 RX ADMIN — OXYCODONE HYDROCHLORIDE 10 MILLIGRAM(S): 5 TABLET ORAL at 20:05

## 2018-12-08 RX ADMIN — Medication 20 MILLIGRAM(S): at 14:14

## 2018-12-08 RX ADMIN — OXYCODONE HYDROCHLORIDE 10 MILLIGRAM(S): 5 TABLET ORAL at 20:51

## 2018-12-08 RX ADMIN — SENNA PLUS 2 TABLET(S): 8.6 TABLET ORAL at 21:34

## 2018-12-08 RX ADMIN — ATORVASTATIN CALCIUM 20 MILLIGRAM(S): 80 TABLET, FILM COATED ORAL at 21:34

## 2018-12-08 RX ADMIN — Medication 20 MILLIGRAM(S): at 18:21

## 2018-12-08 RX ADMIN — Medication 100 MILLIGRAM(S): at 12:47

## 2018-12-08 RX ADMIN — MORPHINE SULFATE 2 MILLIGRAM(S): 50 CAPSULE, EXTENDED RELEASE ORAL at 12:47

## 2018-12-08 RX ADMIN — HEPARIN SODIUM 5000 UNIT(S): 5000 INJECTION INTRAVENOUS; SUBCUTANEOUS at 21:34

## 2018-12-08 RX ADMIN — Medication 12.5 MILLIGRAM(S): at 21:35

## 2018-12-08 RX ADMIN — Medication 100 MILLIGRAM(S): at 21:34

## 2018-12-08 RX ADMIN — Medication 100 MILLIGRAM(S): at 18:20

## 2018-12-08 RX ADMIN — GABAPENTIN 100 MILLIGRAM(S): 400 CAPSULE ORAL at 18:20

## 2018-12-08 NOTE — H&P ADULT - HISTORY OF PRESENT ILLNESS
78 year old woman history of HFpEF(last EF 66% with stage II diastolic dysfunction) on Lasix, HTN, T2DM, Breast Cancer s/p resection with known RUE lymphedema presents to the ED     In the ED patient received Lasix 20mg IV x1, Morphine 2mg IV x1 and Clindamycin 900mg IV x1. 78 year old woman history of HFpEF(last EF 66% with stage II diastolic dysfunction) on Lasix, HTN, T2DM, Breast Cancer s/p resection with known RUE lymphedema presents to the ED worsening b/l LE edema and pain. Daughter at bedside providing collateral information. Reports that patient has chronic LE edema for which she takes  Lasix 40mg PO QD. Reports worsening of LE edema x 1 weeks associated with blisters and increased redness in both legs. Denies fevers or chills. Denies CP or BHAT. Reports that if she lies flat she has SOB. No changes to amount of pillows used. No changes in frequency of urination. No increased in salt intake.     Of note, patient reports right sided hip pain. Describes LE numbness with radiation up to the knee. Reports that she had outside MRI Spine which showed multiple slipped discs and was prescribed Percocet 7.5-325 1 tab q12h with no improvement in pain(started taking the medication 2 days ago). Reports history of right hip OA(describes recent imaging showing bone on bone) with plans for total hip replacement in the future. Walks with cane however reports that it is becoming more difficult. No  bowel or bladder incontinence.     In the ED patient received Lasix 20mg IV x1, Morphine 2mg IV x1 and Clindamycin 900mg IV x1. 78 year old woman history of HFpEF(last EF 66% with stage II diastolic dysfunction) on Lasix, HTN, T2DM, Breast Cancer s/p resection with known RUE lymphedema presents to the ED worsening b/l LE edema and pain. Daughter at bedside providing collateral information. Reports that patient has chronic LE edema for which she takes  Lasix 40mg PO QD. Reports worsening of LE edema x 1 weeks associated with blisters and increased redness in both legs. Denies fevers or chills. Denies CP or BHAT. Reports that if she lies flat she has SOB. No changes to amount of pillows used. No changes in frequency of urination. No increased in salt intake.     Of note, patient reports right sided hip pain. Describes LE numbness with radiation up to the knee. Reports that she had outside MRI Spine which showed multiple slipped discs and was prescribed Percocet 7.5-325 1 tab q12h with no improvement in pain(started taking the medication 2 days ago). Reports history of right hip OA(describes recent imaging showing bone on bone) with plans for total hip replacement in the future. Walks with cane however reports that it is becoming more difficult. No  bowel or bladder incontinence.     In the ED patient received Lasix 20mg IV x1, Morphine 2mg IV x1 and Clindamycin 900mg IV x1.     ISTOP Number:  [id number] 78 year old woman history of HFpEF(last EF 66% with stage II diastolic dysfunction) on Lasix, HTN, T2DM, Breast Cancer s/p resection with known RUE lymphedema presents to the ED worsening b/l LE edema and pain. Daughter at bedside providing collateral information. Reports that patient has chronic LE edema for which she takes  Lasix 40mg PO QD. Reports worsening of LE edema x 1 weeks associated with blisters and increased redness in both legs. Denies fevers or chills. Denies CP or BHAT. Reports that if she lies flat she has SOB. No changes to amount of pillows used. Reports that she has been sleeping in a chair intermittently d/t pain in legs.  No changes in frequency of urination. No increased in salt intake.     Of note, patient reports right sided hip pain. Describes LE numbness with radiation up to the knee. Reports that she had outside MRI Spine which showed multiple slipped discs and was prescribed Percocet 7.5-325 1 tab q12h with no improvement in pain(started taking the medication 2 days ago). Reports history of right hip OA(describes recent imaging showing bone on bone) with plans for total hip replacement in the future. Walks with cane however reports that it is becoming more difficult. No  bowel or bladder incontinence.     In the ED patient received Lasix 20mg IV x1, Morphine 2mg IV x1 and Clindamycin 900mg IV x1.     ISTOP Number:  [id number]

## 2018-12-08 NOTE — H&P ADULT - NSHPREVIEWOFSYSTEMS_GEN_ALL_CORE
CONSTITUTIONAL:  No weight loss, fever, chills, weakness or fatigue.  HEENT:  Eyes:  No visual loss, blurred vision, double vision or yellow sclerae. Ears, Nose, Throat:  No hearing loss, sneezing, congestion, runny nose or sore throat.  SKIN:  No rash or itching.  CARDIOVASCULAR:  No chest pain, chest pressure or chest discomfort. No palpitations or edema.  RESPIRATORY:  No shortness of breath, cough or sputum.  GASTROINTESTINAL:  No anorexia, nausea, vomiting or diarrhea. No abdominal pain or blood.  GENITOURINARY:  Denies hematuria, dysuria.   NEUROLOGICAL:  No headache, dizziness, syncope, paralysis, ataxia, numbness or tingling in the extremities. No change in bowel or bladder control.  MUSCULOSKELETAL:  No muscle, back pain, joint pain or stiffness.  HEMATOLOGIC:  No anemia, bleeding or bruising.  LYMPHATICS:  No enlarged nodes. No history of splenectomy.  PSYCHIATRIC:  No history of depression or anxiety.  ENDOCRINOLOGIC:  No reports of sweating, cold or heat intolerance. No polyuria or polydipsia.  ALLERGIES:  No history of asthma, hives, eczema or rhinitis. CONSTITUTIONAL:  See above   HEENT:  Eyes:  No visual loss, blurred vision, double vision or yellow sclerae. Ears, Nose, Throat:  No hearing loss, sneezing, congestion, runny nose or sore throat.  SKIN:  No rash or itching.  CARDIOVASCULAR:  No chest pain, chest pressure or chest discomfort. No palpitations or edema.  RESPIRATORY:  No shortness of breath, cough or sputum.  GASTROINTESTINAL:  No anorexia, nausea, vomiting or diarrhea. No abdominal pain or blood.  GENITOURINARY:  Denies hematuria, dysuria.   NEUROLOGICAL:  See above   MUSCULOSKELETAL: See above   HEMATOLOGIC:  No anemia, bleeding or bruising.  LYMPHATICS:  No enlarged nodes. No history of splenectomy.  PSYCHIATRIC:  No history of depression or anxiety.  ENDOCRINOLOGIC:  No reports of sweating, cold or heat intolerance. No polyuria or polydipsia.  ALLERGIES:  No history of asthma, hives, eczema or rhinitis.

## 2018-12-08 NOTE — ED PROVIDER NOTE - OBJECTIVE STATEMENT
77 yo female with PMHx of HTN, HLD, DM, diastolic CHF, Breast CA s/p resection and chemo c/b RUE lymphedema p/w BLE swelling and pain.  The patient reports that she has always had some degree of swelling in the legs, but it has been much worse over the last 5 days.  Now with multiple blisters, redness and swelling  L>R.  Patient also c/o low back pain for which she has seen pain management and was started on Oxycodone 7.5 without relief.  Denies fevers/chills, CP, SOB, abd pain, NVDC.

## 2018-12-08 NOTE — H&P ADULT - NSHPLABSRESULTS_GEN_ALL_CORE
Labs personally reviewed by me.                           12.8   9.4   )-----------( 297      ( 08 Dec 2018 12:24 )             40.3     Auto Eosinophil # 0.1   / Auto Eosinophil % 0.7   / Auto Neutrophil # 7.3   / Auto Neutrophil % 77.5  / BANDS % x        12-08    134<L>  |  98  |  30<H>  ----------------------------<  176<H>  5.0   |  24  |  1.54<H  >    Ca    9.6      08 Dec 2018 12:24    TPro  7.4  /  Alb  3.7  /  TBili  0.3  /  DBili  x   /  AST  28  /  ALT  12  /  AlkPhos  63  12-08 12-08-18 @ 12:24 - VBG - pH: 7.41  | pCO2: 48    | pO2: 35    | Lactate: 1.7    Xray Chest 1 View AP/PA (12.08.18 @ 13:03)  IMPRESSION:   Stable cardiomegaly. Mild pulmonary vascular congestive changes.    Transthoracic Echocardiogram (10.01.15 @ 16:17)  Conclusions:  EF 66%  1. Severely dilated left atrium.  LA volume index = 59cc/m2.  2. Normal left ventricular internal dimensions and wallthicknesses.  3. Normal left ventricular systolic function. No segmentalwall motion abnormalities.  4. Moderate diastolic dysfunction (Stage II).  5. Estimated pulmonary artery systolic pressure equals 56mm Hg, assuming right atrial pressure equals 8 mm Hg,consistent with moderate pulmonary pressures. Labs personally reviewed by me.                           12.8   9.4   )-----------( 297      ( 08 Dec 2018 12:24 )             40.3     Auto Eosinophil # 0.1   / Auto Eosinophil % 0.7   / Auto Neutrophil # 7.3   / Auto Neutrophil % 77.5  / BANDS % x        12-08    134<L>  |  98  |  30<H>  ----------------------------<  176<H>  5.0   |  24  |  1.54<H  >    Ca    9.6      08 Dec 2018 12:24    TPro  7.4  /  Alb  3.7  /  TBili  0.3  /  DBili  x   /  AST  28  /  ALT  12  /  AlkPhos  63  12-08 12-08-18 @ 12:24 - VBG - pH: 7.41  | pCO2: 48    | pO2: 35    | Lactate: 1.7    Xray Chest 1 View AP/PA (12.08.18 @ 13:03)  IMPRESSION:   Stable cardiomegaly. Mild pulmonary vascular congestive changes.    Transthoracic Echocardiogram (10.01.15 @ 16:17)  Conclusions:  EF 66%  1. Severely dilated left atrium.  LA volume index = 59cc/m2.  2. Normal left ventricular internal dimensions and wallthicknesses.  3. Normal left ventricular systolic function. No segmentalwall motion abnormalities.  4. Moderate diastolic dysfunction (Stage II).  5. Estimated pulmonary artery systolic pressure equals 56mm Hg, assuming right atrial pressure equals 8 mm Hg, consistent with moderate pulmonary pressures.    EKG: NSR HR 66  QRS 62  V1-V3 q waves unchanged.

## 2018-12-08 NOTE — ED ADULT NURSE NOTE - CHPI ED NUR SYMPTOMS NEG
no tingling/no vomiting/no fever/no dizziness/no weakness/no nausea/no decreased eating/drinking/no chills

## 2018-12-08 NOTE — ED ADULT NURSE NOTE - OBJECTIVE STATEMENT
79 y/o female hx CHF, HTN, HLD, DM, Breast CA s/p resection, right upper lymphedema, presents to the ED from home c/o b/l leg swelling and pain x 5 days. Pt states that swelling has worsened along with "blisters on legs." Pt presents with large ulceration to left medial calf, no visible discharge noted, surrounding erythema and +3 pitting edema. Denies fever, chills, n/v, weakness, abd pain, diarrhea/constipation, numbness/tingling, urinary s/s, SOB, CP. Pt A&Ox3, in no respiratory distress, no CP, left leg greater swelling and pitting edema, erythema noted with blisters to b/l legs. PT safety maintained with family at bedside, call bell within reach and bed in the lowest position.

## 2018-12-08 NOTE — H&P ADULT - PROBLEM SELECTOR PLAN 2
Patient reporting right sided hip/back pain. Likely related to OA. Negative Straight Leg raise.   Pain control: Oyxcodone 10mg q6h PRN for severe pain, Gabapentin 100mg BID.   SenCol suzanneace.   Consider hip XR.   Outpatient Orthopedics evaluation. Patient reporting right sided hip/back pain. Likely related to OA. Negative Straight Leg raise.   Pain control: Oxycodone 10mg q6h PRN for severe pain, Gabapentin 100mg BID.   Sennaace.   Consider hip XR.   Outpatient Orthopedics evaluation.

## 2018-12-08 NOTE — H&P ADULT - PROBLEM SELECTOR PLAN 1
Patient p/w worsening LE edema and signs of orthopnea.  Likely mild CHF exacerbation.   Lasix 20mg IV BID. Strict I/Os. Daily weights.   ECHO in AM. Patient p/w worsening LE edema and signs of orthopnea.  Likely mild CHF exacerbation.   Lasix 20mg IV BID. Strict I/Os. Daily weights. Net negative 1-2L.  ECHO in AM.  Switch home Atenolol to Metoprolol.

## 2018-12-08 NOTE — ED PROVIDER NOTE - PMH
Breast CA, right    Breast CA, right  IV Chemotherapy  CHF (congestive heart failure)    HTN (hypertension)    Hyperlipidemia    Lymphedema of arm  right arm s/p mastectomy  Obese    Rectal cancer    S/P chemotherapy, time since greater than 12 weeks  2/2015-3/2015  S/P radiation > 12 weeks  2/2015-3/2015  Type II diabetes mellitus

## 2018-12-08 NOTE — H&P ADULT - PSH
H/O mastectomy, right  1988  History of appendectomy  1953  S/P colectomy    S/P ileostomy  low anterior resection-8/10/15  S/P mastectomy, right

## 2018-12-08 NOTE — ED PROVIDER NOTE - PHYSICAL EXAMINATION
Gen: AAO x 3, NAD  Skin: BLE erythema and swelling L>R.  +bulla formation.  Large left lateral lower leg ulceration, dry.  no purulence.  HEENT: NC/AT, PERRLA, EOMI, MMM  Resp: unlabored CTAB  Cardiac: rrr s1s2, no murmurs, rubs or gallops  GI: ND, +BS, Soft, NT  Ext: 2+ pedal edema BL L>R, FROM in all extremities  Neuro: no focal deficits

## 2018-12-08 NOTE — H&P ADULT - NSHPPHYSICALEXAM_GEN_ALL_CORE
Vital Signs Last 24 Hrs  T(C): 37.2 (12-08-18 @ 11:24)  T(F): 99 (12-08-18 @ 11:24), Max: 99 (12-08-18 @ 11:24)  HR: 61 (12-08-18 @ 12:43) (60 - 61)  BP: 152/50 (12-08-18 @ 12:43)  RR: 17 (12-08-18 @ 12:43) (17 - 20)  SpO2: 97% (12-08-18 @ 12:43) (97% - 97%)    Physical Exam   Appearance: Normal	  HEENT:   Normal oral mucosa, PERRL, EOMI	  Lymphatic: No lymphadenopathy  Cardiovascular: Normal S1 S2, No JVD, No murmurs, No edema  Respiratory: Lungs clear to auscultation	  Psychiatry: A & O x 3, Mood & affect appropriate  Gastrointestinal:  Soft, Non-tender, + BS	  Skin: No rashes, No ecchymoses, No cyanosis	  Neurologic: Non-focal  Extremities: Normal range of motion, No clubbing, cyanosis or edema  Vascular: Peripheral pulses palpable 2+ bilaterally Vital Signs Last 24 Hrs  T(C): 37.2 (12-08-18 @ 11:24)  T(F): 99 (12-08-18 @ 11:24), Max: 99 (12-08-18 @ 11:24)  HR: 61 (12-08-18 @ 12:43) (60 - 61)  BP: 152/50 (12-08-18 @ 12:43)  RR: 17 (12-08-18 @ 12:43) (17 - 20)  SpO2: 97% (12-08-18 @ 12:43) (97% - 97%)    Physical Exam   Appearance: Elderly appearing woman in NAD. 	  HEENT:   Normal oral mucosa, PERRL, EOMI	  Lymphatic: No lymphadenopathy  Cardiovascular: Normal S1 S2, No JVD, No murmurs.   Respiratory: Good air entry. Faint crackles at bases b/l.   Psychiatry: A & O x 3, Mood & affect appropriate  Gastrointestinal:  Soft, Non-tender, Non-distended. + BS	  Skin:   Neurologic: Non-focal  Extremities: Limited ROM in hip due to pain. Negative straight leg raise.   Vascular: Peripheral pulses palpable 2+ bilaterally Vital Signs Last 24 Hrs  T(C): 37.2 (12-08-18 @ 11:24)  T(F): 99 (12-08-18 @ 11:24), Max: 99 (12-08-18 @ 11:24)  HR: 61 (12-08-18 @ 12:43) (60 - 61)  BP: 152/50 (12-08-18 @ 12:43)  RR: 17 (12-08-18 @ 12:43) (17 - 20)  SpO2: 97% (12-08-18 @ 12:43) (97% - 97%)    Physical Exam   Appearance: Elderly appearing woman in NAD. 	  HEENT:   Normal oral mucosa, PERRL, EOMI	  Lymphatic: No lymphadenopathy  Cardiovascular: Normal S1 S2, No JVD, No murmurs.   Respiratory: Good air entry. Faint crackles at bases b/l.   Psychiatry: A & O x 3, Mood & affect appropriate  Gastrointestinal:  Soft, Non-tender, Non-distended. + BS	  Skin: b/l pitting 1+ LE edema extending up to knee with prominent erythema.  RLE clear blisters with skin intact. LLE popped blister with surrounding erythema, dry with crusting.   Neurologic: Non-focal  Extremities: Limited ROM in hip due to pain. Negative straight leg raise.   Vascular: Peripheral pulses palpable 2+ bilaterally

## 2018-12-08 NOTE — H&P ADULT - ASSESSMENT
78 year old woman history of HFpEF(last EF 66% with stage II diastolic dysfunction) on Lasix, HTN, T2DM, Breast Cancer s/p resection with known RUE lymphedema presents to the ED worsening b/l LE edema and pain admitted with CHF exacerbation.

## 2018-12-08 NOTE — ED PROVIDER NOTE - ATTENDING CONTRIBUTION TO CARE
77 y/o F with h/o HTN HLD DM dCHF HTN Obesity, presenting with worsenign swelling of the lower extremities over the past 1 week; also c/o worsening chronic low back pain.    No fever/chills, no n/v, no SOB, no chest pain, no abdominal pain, no diarrhea, has not passed out.    On Physical Exam:  General: well appearing, in NAD, speaking clearly in full sentences and without difficulty; cooperative with exam  HEENT: PERRL, MMM  Neck: no neck tenderness, no nuchal rigidity  Cardiac: normal s1, s2; RRR; no MGR  Lungs: CTABL  Abdomen: soft nontender/nondistended  : no bladder tenderness or distension  Skin: intact, no rash  Extremities: 2+ pitting edema to lower extremities extending to knees, multiple areas of open wounds / excoriations, mild erythema, few intact bullae, no significant warmth.   Neuro: no gross neurologic deficits     AP: worsening lymphedema likely 2/2 CHF; will check labs, ecg, cxr; likely admission for further evaluation/management of CHF.  may also require additional wound care.  at present, afebrile, and LE not appearing overtly cellulitis, though bacterial superinfection risk exists so will need careful management.

## 2018-12-08 NOTE — H&P ADULT - PROBLEM SELECTOR PLAN 4
DVT PPx  Diet  Dispo Unknown A1C. Pending A1C in AM.   Hold Tradjenta and Glimepiride.    ISS and FS.

## 2018-12-08 NOTE — ED ADULT NURSE NOTE - NSIMPLEMENTINTERV_GEN_ALL_ED
Implemented All Fall with Harm Risk Interventions:  [place] to call system. Call bell, personal items and telephone within reach. Instruct patient to call for assistance. Room bathroom lighting operational. Non-slip footwear when patient is off stretcher. Physically safe environment: no spills, clutter or unnecessary equipment. Stretcher in lowest position, wheels locked, appropriate side rails in place. Provide visual cue, wrist band, yellow gown, etc. Monitor gait and stability. Monitor for mental status changes and reorient to person, place, and time. Review medications for side effects contributing to fall risk. Reinforce activity limits and safety measures with patient and family. Provide visual clues: red socks.

## 2018-12-09 LAB
ANION GAP SERPL CALC-SCNC: 13 MMOL/L — SIGNIFICANT CHANGE UP (ref 5–17)
BUN SERPL-MCNC: 29 MG/DL — HIGH (ref 7–23)
CALCIUM SERPL-MCNC: 9.3 MG/DL — SIGNIFICANT CHANGE UP (ref 8.4–10.5)
CHLORIDE SERPL-SCNC: 99 MMOL/L — SIGNIFICANT CHANGE UP (ref 96–108)
CO2 SERPL-SCNC: 29 MMOL/L — SIGNIFICANT CHANGE UP (ref 22–31)
CREAT SERPL-MCNC: 1.71 MG/DL — HIGH (ref 0.5–1.3)
GLUCOSE BLDC GLUCOMTR-MCNC: 128 MG/DL — HIGH (ref 70–99)
GLUCOSE BLDC GLUCOMTR-MCNC: 255 MG/DL — HIGH (ref 70–99)
GLUCOSE BLDC GLUCOMTR-MCNC: 263 MG/DL — HIGH (ref 70–99)
GLUCOSE BLDC GLUCOMTR-MCNC: 90 MG/DL — SIGNIFICANT CHANGE UP (ref 70–99)
GLUCOSE SERPL-MCNC: 89 MG/DL — SIGNIFICANT CHANGE UP (ref 70–99)
HBA1C BLD-MCNC: 6.7 % — HIGH (ref 4–5.6)
HCT VFR BLD CALC: 37.7 % — SIGNIFICANT CHANGE UP (ref 34.5–45)
HGB BLD-MCNC: 12.2 G/DL — SIGNIFICANT CHANGE UP (ref 11.5–15.5)
MAGNESIUM SERPL-MCNC: 2.1 MG/DL — SIGNIFICANT CHANGE UP (ref 1.6–2.6)
MCHC RBC-ENTMCNC: 30.3 PG — SIGNIFICANT CHANGE UP (ref 27–34)
MCHC RBC-ENTMCNC: 32.5 GM/DL — SIGNIFICANT CHANGE UP (ref 32–36)
MCV RBC AUTO: 93.2 FL — SIGNIFICANT CHANGE UP (ref 80–100)
PHOSPHATE SERPL-MCNC: 4.1 MG/DL — SIGNIFICANT CHANGE UP (ref 2.5–4.5)
PLATELET # BLD AUTO: 282 K/UL — SIGNIFICANT CHANGE UP (ref 150–400)
POTASSIUM SERPL-MCNC: 3.7 MMOL/L — SIGNIFICANT CHANGE UP (ref 3.5–5.3)
POTASSIUM SERPL-SCNC: 3.7 MMOL/L — SIGNIFICANT CHANGE UP (ref 3.5–5.3)
RBC # BLD: 4.04 M/UL — SIGNIFICANT CHANGE UP (ref 3.8–5.2)
RBC # FLD: 14.4 % — SIGNIFICANT CHANGE UP (ref 10.3–14.5)
SODIUM SERPL-SCNC: 141 MMOL/L — SIGNIFICANT CHANGE UP (ref 135–145)
WBC # BLD: 6.7 K/UL — SIGNIFICANT CHANGE UP (ref 3.8–10.5)
WBC # FLD AUTO: 6.7 K/UL — SIGNIFICANT CHANGE UP (ref 3.8–10.5)

## 2018-12-09 PROCEDURE — 99233 SBSQ HOSP IP/OBS HIGH 50: CPT | Mod: GC

## 2018-12-09 RX ORDER — DEXTROSE 50 % IN WATER 50 %
12.5 SYRINGE (ML) INTRAVENOUS ONCE
Qty: 0 | Refills: 0 | Status: DISCONTINUED | OUTPATIENT
Start: 2018-12-09 | End: 2018-12-12

## 2018-12-09 RX ORDER — INSULIN LISPRO 100/ML
3 VIAL (ML) SUBCUTANEOUS
Qty: 0 | Refills: 0 | Status: DISCONTINUED | OUTPATIENT
Start: 2018-12-09 | End: 2018-12-12

## 2018-12-09 RX ORDER — ACETAMINOPHEN 500 MG
650 TABLET ORAL EVERY 6 HOURS
Qty: 0 | Refills: 0 | Status: DISCONTINUED | OUTPATIENT
Start: 2018-12-09 | End: 2018-12-10

## 2018-12-09 RX ORDER — DEXTROSE 50 % IN WATER 50 %
15 SYRINGE (ML) INTRAVENOUS ONCE
Qty: 0 | Refills: 0 | Status: DISCONTINUED | OUTPATIENT
Start: 2018-12-09 | End: 2018-12-12

## 2018-12-09 RX ORDER — HUMAN INSULIN 100 [IU]/ML
9 INJECTION, SUSPENSION SUBCUTANEOUS ONCE
Qty: 0 | Refills: 0 | Status: COMPLETED | OUTPATIENT
Start: 2018-12-09 | End: 2018-12-09

## 2018-12-09 RX ORDER — SODIUM CHLORIDE 9 MG/ML
1000 INJECTION, SOLUTION INTRAVENOUS
Qty: 0 | Refills: 0 | Status: DISCONTINUED | OUTPATIENT
Start: 2018-12-09 | End: 2018-12-12

## 2018-12-09 RX ORDER — INSULIN GLARGINE 100 [IU]/ML
9 INJECTION, SOLUTION SUBCUTANEOUS AT BEDTIME
Qty: 0 | Refills: 0 | Status: DISCONTINUED | OUTPATIENT
Start: 2018-12-09 | End: 2018-12-12

## 2018-12-09 RX ORDER — DEXTROSE 50 % IN WATER 50 %
25 SYRINGE (ML) INTRAVENOUS ONCE
Qty: 0 | Refills: 0 | Status: DISCONTINUED | OUTPATIENT
Start: 2018-12-09 | End: 2018-12-12

## 2018-12-09 RX ORDER — INSULIN GLARGINE 100 [IU]/ML
10 INJECTION, SOLUTION SUBCUTANEOUS AT BEDTIME
Qty: 0 | Refills: 0 | Status: DISCONTINUED | OUTPATIENT
Start: 2018-12-09 | End: 2018-12-09

## 2018-12-09 RX ORDER — GLUCAGON INJECTION, SOLUTION 0.5 MG/.1ML
1 INJECTION, SOLUTION SUBCUTANEOUS ONCE
Qty: 0 | Refills: 0 | Status: DISCONTINUED | OUTPATIENT
Start: 2018-12-09 | End: 2018-12-12

## 2018-12-09 RX ADMIN — HEPARIN SODIUM 5000 UNIT(S): 5000 INJECTION INTRAVENOUS; SUBCUTANEOUS at 05:17

## 2018-12-09 RX ADMIN — ISOSORBIDE MONONITRATE 60 MILLIGRAM(S): 60 TABLET, EXTENDED RELEASE ORAL at 12:52

## 2018-12-09 RX ADMIN — Medication 12.5 MILLIGRAM(S): at 17:51

## 2018-12-09 RX ADMIN — Medication 1: at 23:10

## 2018-12-09 RX ADMIN — Medication 12.5 MILLIGRAM(S): at 05:17

## 2018-12-09 RX ADMIN — Medication 3: at 12:52

## 2018-12-09 RX ADMIN — Medication 20 MILLIGRAM(S): at 17:50

## 2018-12-09 RX ADMIN — Medication 650 MILLIGRAM(S): at 23:22

## 2018-12-09 RX ADMIN — SENNA PLUS 2 TABLET(S): 8.6 TABLET ORAL at 21:10

## 2018-12-09 RX ADMIN — Medication 3 UNIT(S): at 17:52

## 2018-12-09 RX ADMIN — GABAPENTIN 100 MILLIGRAM(S): 400 CAPSULE ORAL at 17:51

## 2018-12-09 RX ADMIN — HUMAN INSULIN 9 UNIT(S): 100 INJECTION, SUSPENSION SUBCUTANEOUS at 17:52

## 2018-12-09 RX ADMIN — OXYCODONE HYDROCHLORIDE 10 MILLIGRAM(S): 5 TABLET ORAL at 09:46

## 2018-12-09 RX ADMIN — INSULIN GLARGINE 9 UNIT(S): 100 INJECTION, SOLUTION SUBCUTANEOUS at 23:10

## 2018-12-09 RX ADMIN — HEPARIN SODIUM 5000 UNIT(S): 5000 INJECTION INTRAVENOUS; SUBCUTANEOUS at 21:10

## 2018-12-09 RX ADMIN — Medication 1000 UNIT(S): at 12:52

## 2018-12-09 RX ADMIN — Medication 100 MILLIGRAM(S): at 21:10

## 2018-12-09 RX ADMIN — HEPARIN SODIUM 5000 UNIT(S): 5000 INJECTION INTRAVENOUS; SUBCUTANEOUS at 13:02

## 2018-12-09 RX ADMIN — Medication 100 MILLIGRAM(S): at 05:17

## 2018-12-09 RX ADMIN — OXYCODONE HYDROCHLORIDE 10 MILLIGRAM(S): 5 TABLET ORAL at 10:30

## 2018-12-09 RX ADMIN — Medication 100 MILLIGRAM(S): at 13:02

## 2018-12-09 RX ADMIN — GABAPENTIN 100 MILLIGRAM(S): 400 CAPSULE ORAL at 05:17

## 2018-12-09 RX ADMIN — OXYCODONE HYDROCHLORIDE 10 MILLIGRAM(S): 5 TABLET ORAL at 21:11

## 2018-12-09 RX ADMIN — Medication 100 MILLIGRAM(S): at 17:51

## 2018-12-09 RX ADMIN — OXYCODONE HYDROCHLORIDE 10 MILLIGRAM(S): 5 TABLET ORAL at 21:41

## 2018-12-09 RX ADMIN — Medication 0: at 17:51

## 2018-12-09 RX ADMIN — ATORVASTATIN CALCIUM 20 MILLIGRAM(S): 80 TABLET, FILM COATED ORAL at 21:10

## 2018-12-09 RX ADMIN — Medication 20 MILLIGRAM(S): at 05:17

## 2018-12-09 NOTE — PROGRESS NOTE ADULT - PROBLEM SELECTOR PLAN 4
- A1C 6.7. Holding Tradjenta and Glimepiride while in hospital. s/p NPH 9 units  - Lantus 9 at bedtime, Lispro 3u TID ISS and FS.  - Consistent carb diet

## 2018-12-09 NOTE — PROGRESS NOTE ADULT - SUBJECTIVE AND OBJECTIVE BOX
Todd Vasquez  PGY-1 Resident Physician   Pager [phone] or [phone] from 7am to 7pm, after 7pm    Patient is a 78y old  Female who presents with a chief complaint of lower extremity edema (08 Dec 2018 15:27)    SUBJECTIVE / OVERNIGHT EVENTS:  No acute overnight events. Patient    MEDICATIONS  (STANDING):  atorvastatin 20 milliGRAM(s) Oral at bedtime  cholecalciferol 1000 Unit(s) Oral daily  dextrose 5%. 1000 milliLiter(s) (50 mL/Hr) IV Continuous <Continuous>  dextrose 50% Injectable 12.5 Gram(s) IV Push once  dextrose 50% Injectable 25 Gram(s) IV Push once  dextrose 50% Injectable 25 Gram(s) IV Push once  docusate sodium 100 milliGRAM(s) Oral three times a day  furosemide   Injectable 20 milliGRAM(s) IV Push every 12 hours  gabapentin 100 milliGRAM(s) Oral two times a day  heparin  Injectable 5000 Unit(s) SubCutaneous every 8 hours  hydrALAZINE 100 milliGRAM(s) Oral every 12 hours  insulin glargine Injectable (LANTUS) 9 Unit(s) SubCutaneous at bedtime  insulin lispro (HumaLOG) corrective regimen sliding scale   SubCutaneous three times a day before meals  insulin lispro (HumaLOG) corrective regimen sliding scale   SubCutaneous at bedtime  insulin lispro Injectable (HumaLOG) 3 Unit(s) SubCutaneous three times a day before meals  insulin NPH human recombinant 9 Unit(s) SubCutaneous once  isosorbide   mononitrate ER Tablet (IMDUR) 60 milliGRAM(s) Oral daily  metoprolol tartrate 12.5 milliGRAM(s) Oral two times a day  senna 2 Tablet(s) Oral at bedtime    MEDICATIONS  (PRN):  dextrose 40% Gel 15 Gram(s) Oral once PRN Blood Glucose LESS THAN 70 milliGRAM(s)/deciliter  glucagon  Injectable 1 milliGRAM(s) IntraMuscular once PRN Glucose LESS THAN 70 milligrams/deciliter  oxyCODONE    IR 10 milliGRAM(s) Oral every 6 hours PRN Severe Pain (7 - 10)    Allergies    CT scan dye (Hives)  IV Contrast (Unknown)  penicillin (Angioedema)  penicillin (Unknown)    Intolerances        Vital Signs Last 24 Hrs  T(C): 36.7 (09 Dec 2018 12:45), Max: 36.8 (08 Dec 2018 22:12)  T(F): 98 (09 Dec 2018 12:45), Max: 98.2 (08 Dec 2018 22:12)  HR: 59 (09 Dec 2018 12:45) (59 - 67)  BP: 141/51 (09 Dec 2018 12:45) (133/64 - 145/84)  BP(mean): --  RR: 18 (09 Dec 2018 12:45) (18 - 18)  SpO2: 94% (09 Dec 2018 12:45) (93% - 96%)  Daily     Daily   CAPILLARY BLOOD GLUCOSE      POCT Blood Glucose.: 255 mg/dL (09 Dec 2018 12:46)  POCT Blood Glucose.: 128 mg/dL (09 Dec 2018 08:36)  POCT Blood Glucose.: 224 mg/dL (08 Dec 2018 21:32)    I&O's Summary    08 Dec 2018 07:01  -  09 Dec 2018 07:00  --------------------------------------------------------  IN: 240 mL / OUT: 1900 mL / NET: -1660 mL    09 Dec 2018 07:01  -  09 Dec 2018 16:28  --------------------------------------------------------  IN: 720 mL / OUT: 1000 mL / NET: -280 mL        PHYSICAL EXAM:  GENERAL: NAD, well-developed  HEAD:  Atraumatic, Normocephalic  EYES: EOMI, PERRLA, conjunctiva and sclera clear  NECK: Supple, No JVD  CHEST/LUNG: Clear to auscultation bilaterally; No wheeze  HEART: Regular rate and rhythm; Normal S1 S2, No murmurs, rubs, or gallops  ABDOMEN: Soft, Nontender, Nondistended; Bowel sounds present  EXTREMITIES:  2+ Peripheral Pulses, No clubbing, cyanosis, or edema  PSYCH: AAOx3  NEUROLOGY: non-focal  SKIN: No rashes or lesions    LABS:                        12.2   6.7   )-----------( 282      ( 09 Dec 2018 07:24 )             37.7     Hgb Trend: 12.2<--, 12.8<--  12-09    141  |  99  |  29<H>  ----------------------------<  89  3.7   |  29  |  1.71<H>    Ca    9.3      09 Dec 2018 07:20  Phos  4.1     12-09  Mg     2.1     12-09    TPro  7.4  /  Alb  3.7  /  TBili  0.3  /  DBili  x   /  AST  28  /  ALT  12  /  AlkPhos  63  12-08    Creatinine Trend: 1.71<--, 1.54<--  LIVER FUNCTIONS - ( 08 Dec 2018 12:24 )  Alb: 3.7 g/dL / Pro: 7.4 g/dL / ALK PHOS: 63 U/L / ALT: 12 U/L / AST: 28 U/L / GGT: x           PT/INR - ( 08 Dec 2018 12:24 )   PT: 10.8 sec;   INR: 0.94 ratio         PTT - ( 08 Dec 2018 12:24 )  PTT:28.5 sec          RADIOLOGY & ADDITIONAL TESTS:    Imaging Personally Reviewed:    Consultant(s) Notes Reviewed:      Care Discussed with Consultants/Other Providers:

## 2018-12-09 NOTE — PROGRESS NOTE ADULT - PROBLEM SELECTOR PLAN 2
Patient reporting right sided hip/back pain. Likely related to OA. Negative Straight Leg raise.   Pain control: Oxycodone 10mg q6h PRN for severe pain, Gabapentin 100mg BID.   Sennaace.   Consider hip XR.   Outpatient Orthopedics evaluation.

## 2018-12-09 NOTE — PROGRESS NOTE ADULT - PROBLEM SELECTOR PLAN 6
DVT PPx: HSQ   Diet: Low Salt Diet, Consistent carb diet   Dispo: When pain improves. PT recommendation.

## 2018-12-09 NOTE — PROGRESS NOTE ADULT - PROBLEM SELECTOR PLAN 1
Patient p/w worsening LE edema and signs of orthopnea.  Likely mild CHF exacerbation.   Lasix 20mg IV BID. Fluid restriction. Strict I/Os. Daily weights. Net negative 1-2L.  ECHO ordered. Low salt diet.   Switch home Atenolol to Metoprolol.

## 2018-12-10 ENCOUNTER — TRANSCRIPTION ENCOUNTER (OUTPATIENT)
Age: 78
End: 2018-12-10

## 2018-12-10 LAB
ANION GAP SERPL CALC-SCNC: 11 MMOL/L — SIGNIFICANT CHANGE UP (ref 5–17)
BASOPHILS # BLD AUTO: 0 K/UL — SIGNIFICANT CHANGE UP (ref 0–0.2)
BASOPHILS NFR BLD AUTO: 0.5 % — SIGNIFICANT CHANGE UP (ref 0–2)
BUN SERPL-MCNC: 31 MG/DL — HIGH (ref 7–23)
CALCIUM SERPL-MCNC: 9.1 MG/DL — SIGNIFICANT CHANGE UP (ref 8.4–10.5)
CHLORIDE SERPL-SCNC: 99 MMOL/L — SIGNIFICANT CHANGE UP (ref 96–108)
CO2 SERPL-SCNC: 30 MMOL/L — SIGNIFICANT CHANGE UP (ref 22–31)
CREAT SERPL-MCNC: 1.74 MG/DL — HIGH (ref 0.5–1.3)
EOSINOPHIL # BLD AUTO: 0 K/UL — SIGNIFICANT CHANGE UP (ref 0–0.5)
EOSINOPHIL NFR BLD AUTO: 0.7 % — SIGNIFICANT CHANGE UP (ref 0–6)
GLUCOSE BLDC GLUCOMTR-MCNC: 132 MG/DL — HIGH (ref 70–99)
GLUCOSE BLDC GLUCOMTR-MCNC: 156 MG/DL — HIGH (ref 70–99)
GLUCOSE BLDC GLUCOMTR-MCNC: 166 MG/DL — HIGH (ref 70–99)
GLUCOSE BLDC GLUCOMTR-MCNC: 180 MG/DL — HIGH (ref 70–99)
GLUCOSE SERPL-MCNC: 76 MG/DL — SIGNIFICANT CHANGE UP (ref 70–99)
HCT VFR BLD CALC: 35.8 % — SIGNIFICANT CHANGE UP (ref 34.5–45)
HGB BLD-MCNC: 11.7 G/DL — SIGNIFICANT CHANGE UP (ref 11.5–15.5)
LYMPHOCYTES # BLD AUTO: 1.1 K/UL — SIGNIFICANT CHANGE UP (ref 1–3.3)
LYMPHOCYTES # BLD AUTO: 16.8 % — SIGNIFICANT CHANGE UP (ref 13–44)
MAGNESIUM SERPL-MCNC: 2 MG/DL — SIGNIFICANT CHANGE UP (ref 1.6–2.6)
MCHC RBC-ENTMCNC: 30.3 PG — SIGNIFICANT CHANGE UP (ref 27–34)
MCHC RBC-ENTMCNC: 32.7 GM/DL — SIGNIFICANT CHANGE UP (ref 32–36)
MCV RBC AUTO: 92.6 FL — SIGNIFICANT CHANGE UP (ref 80–100)
MONOCYTES # BLD AUTO: 0.8 K/UL — SIGNIFICANT CHANGE UP (ref 0–0.9)
MONOCYTES NFR BLD AUTO: 12 % — SIGNIFICANT CHANGE UP (ref 2–14)
NEUTROPHILS # BLD AUTO: 4.6 K/UL — SIGNIFICANT CHANGE UP (ref 1.8–7.4)
NEUTROPHILS NFR BLD AUTO: 70 % — SIGNIFICANT CHANGE UP (ref 43–77)
PHOSPHATE SERPL-MCNC: 4.2 MG/DL — SIGNIFICANT CHANGE UP (ref 2.5–4.5)
PLATELET # BLD AUTO: 274 K/UL — SIGNIFICANT CHANGE UP (ref 150–400)
POTASSIUM SERPL-MCNC: 3.3 MMOL/L — LOW (ref 3.5–5.3)
POTASSIUM SERPL-SCNC: 3.3 MMOL/L — LOW (ref 3.5–5.3)
RBC # BLD: 3.86 M/UL — SIGNIFICANT CHANGE UP (ref 3.8–5.2)
RBC # FLD: 13.7 % — SIGNIFICANT CHANGE UP (ref 10.3–14.5)
SODIUM SERPL-SCNC: 140 MMOL/L — SIGNIFICANT CHANGE UP (ref 135–145)
WBC # BLD: 6.5 K/UL — SIGNIFICANT CHANGE UP (ref 3.8–10.5)
WBC # FLD AUTO: 6.5 K/UL — SIGNIFICANT CHANGE UP (ref 3.8–10.5)

## 2018-12-10 PROCEDURE — 99233 SBSQ HOSP IP/OBS HIGH 50: CPT | Mod: GC

## 2018-12-10 PROCEDURE — 93306 TTE W/DOPPLER COMPLETE: CPT | Mod: 26

## 2018-12-10 RX ORDER — POTASSIUM CHLORIDE 20 MEQ
40 PACKET (EA) ORAL EVERY 4 HOURS
Qty: 0 | Refills: 0 | Status: COMPLETED | OUTPATIENT
Start: 2018-12-10 | End: 2018-12-10

## 2018-12-10 RX ORDER — ACETAMINOPHEN 500 MG
975 TABLET ORAL EVERY 8 HOURS
Qty: 0 | Refills: 0 | Status: DISCONTINUED | OUTPATIENT
Start: 2018-12-10 | End: 2018-12-12

## 2018-12-10 RX ORDER — FUROSEMIDE 40 MG
40 TABLET ORAL ONCE
Qty: 0 | Refills: 0 | Status: COMPLETED | OUTPATIENT
Start: 2018-12-10 | End: 2018-12-10

## 2018-12-10 RX ORDER — ACETAMINOPHEN 500 MG
975 TABLET ORAL EVERY 6 HOURS
Qty: 0 | Refills: 0 | Status: DISCONTINUED | OUTPATIENT
Start: 2018-12-10 | End: 2018-12-10

## 2018-12-10 RX ADMIN — OXYCODONE HYDROCHLORIDE 10 MILLIGRAM(S): 5 TABLET ORAL at 01:11

## 2018-12-10 RX ADMIN — Medication 40 MILLIEQUIVALENT(S): at 18:03

## 2018-12-10 RX ADMIN — Medication 100 MILLIGRAM(S): at 18:05

## 2018-12-10 RX ADMIN — OXYCODONE HYDROCHLORIDE 10 MILLIGRAM(S): 5 TABLET ORAL at 21:34

## 2018-12-10 RX ADMIN — Medication 20 MILLIGRAM(S): at 05:23

## 2018-12-10 RX ADMIN — ISOSORBIDE MONONITRATE 60 MILLIGRAM(S): 60 TABLET, EXTENDED RELEASE ORAL at 12:20

## 2018-12-10 RX ADMIN — Medication 650 MILLIGRAM(S): at 10:08

## 2018-12-10 RX ADMIN — Medication 40 MILLIEQUIVALENT(S): at 10:07

## 2018-12-10 RX ADMIN — Medication 1000 UNIT(S): at 12:20

## 2018-12-10 RX ADMIN — GABAPENTIN 100 MILLIGRAM(S): 400 CAPSULE ORAL at 18:03

## 2018-12-10 RX ADMIN — HEPARIN SODIUM 5000 UNIT(S): 5000 INJECTION INTRAVENOUS; SUBCUTANEOUS at 13:27

## 2018-12-10 RX ADMIN — Medication 12.5 MILLIGRAM(S): at 18:05

## 2018-12-10 RX ADMIN — Medication 100 MILLIGRAM(S): at 05:23

## 2018-12-10 RX ADMIN — Medication 40 MILLIGRAM(S): at 12:21

## 2018-12-10 RX ADMIN — Medication 1: at 18:03

## 2018-12-10 RX ADMIN — Medication 650 MILLIGRAM(S): at 00:09

## 2018-12-10 RX ADMIN — HEPARIN SODIUM 5000 UNIT(S): 5000 INJECTION INTRAVENOUS; SUBCUTANEOUS at 21:22

## 2018-12-10 RX ADMIN — Medication 3 UNIT(S): at 18:03

## 2018-12-10 RX ADMIN — Medication 12.5 MILLIGRAM(S): at 05:23

## 2018-12-10 RX ADMIN — Medication 3 UNIT(S): at 13:26

## 2018-12-10 RX ADMIN — Medication 650 MILLIGRAM(S): at 08:59

## 2018-12-10 RX ADMIN — INSULIN GLARGINE 9 UNIT(S): 100 INJECTION, SOLUTION SUBCUTANEOUS at 21:51

## 2018-12-10 RX ADMIN — Medication 975 MILLIGRAM(S): at 21:22

## 2018-12-10 RX ADMIN — OXYCODONE HYDROCHLORIDE 10 MILLIGRAM(S): 5 TABLET ORAL at 20:56

## 2018-12-10 RX ADMIN — OXYCODONE HYDROCHLORIDE 10 MILLIGRAM(S): 5 TABLET ORAL at 02:06

## 2018-12-10 RX ADMIN — SENNA PLUS 2 TABLET(S): 8.6 TABLET ORAL at 21:21

## 2018-12-10 RX ADMIN — Medication 3 UNIT(S): at 09:00

## 2018-12-10 RX ADMIN — HEPARIN SODIUM 5000 UNIT(S): 5000 INJECTION INTRAVENOUS; SUBCUTANEOUS at 05:23

## 2018-12-10 RX ADMIN — ATORVASTATIN CALCIUM 20 MILLIGRAM(S): 80 TABLET, FILM COATED ORAL at 21:21

## 2018-12-10 RX ADMIN — Medication 100 MILLIGRAM(S): at 13:27

## 2018-12-10 RX ADMIN — Medication 100 MILLIGRAM(S): at 21:21

## 2018-12-10 RX ADMIN — Medication 1: at 13:26

## 2018-12-10 RX ADMIN — GABAPENTIN 100 MILLIGRAM(S): 400 CAPSULE ORAL at 05:23

## 2018-12-10 NOTE — DISCHARGE NOTE ADULT - PATIENT PORTAL LINK FT
You can access the [facility] Portal, offered by [facility], by registering with the following website: [email or web address]

## 2018-12-10 NOTE — PHYSICAL THERAPY INITIAL EVALUATION ADULT - CRITERIA FOR SKILLED THERAPEUTIC INTERVENTIONS
anticipated discharge recommendation/predicted duration of therapy intervention/impairments found/therapy frequency/risk reduction/prevention/rehab potential

## 2018-12-10 NOTE — DISCHARGE NOTE ADULT - CARE PLAN
Principal Discharge DX:	Acute on chronic congestive heart failure  Assessment and plan of treatment:	You were found with heart failure exacerbation likely due to not following a proper diet. You received diuretics to improve your fluid retention.  Secondary Diagnosis:	HTN (hypertension)  Assessment and plan of treatment:	Continue taking your metoprolol, isosorbide and hydralazine. Follow up with your cardiologist within two weeks for monitoring your hear failure and medications.  Secondary Diagnosis:	Type II diabetes mellitus  Assessment and plan of treatment:	Continue with low carbohydrate diet and continue taking your oral medications. Your hemoglobin a1c was 6.7 in the hospital. Principal Discharge DX:	Acute on chronic congestive heart failure  Goal:	Improved  Assessment and plan of treatment:	You were found with heart failure exacerbation likely due to not following a proper diet. You received diuretics to improve your fluid retention.  Secondary Diagnosis:	HTN (hypertension)  Assessment and plan of treatment:	Continue taking your metoprolol, isosorbide and hydralazine. Follow up with your cardiologist within two weeks for monitoring your hear failure and medications.  Secondary Diagnosis:	Type II diabetes mellitus  Assessment and plan of treatment:	Continue with low carbohydrate diet and continue taking your oral medications. Your hemoglobin a1c was 6.7 in the hospital. Principal Discharge DX:	Acute on chronic congestive heart failure  Goal:	Improved  Assessment and plan of treatment:	You were found with heart failure exacerbation likely due to not following a proper diet. You had an echocardiogram which was unchanged from previous. You received diuretics to improve your fluid retention. During your hospital stay your atenolol was switched to metoprolol due to that is better for heart failure. You will continue taking your lasix 60mg daily at home and will follow a strict diet. Please follow up with your cardiologist within one week from discharge.  Secondary Diagnosis:	HTN (hypertension)  Assessment and plan of treatment:	Continue taking your metoprolol, isosorbide and hydralazine. Follow up with your cardiologist within two weeks for monitoring your hear failure and medications.  Secondary Diagnosis:	Type II diabetes mellitus  Assessment and plan of treatment:	Continue with low carbohydrate diet and continue taking your oral medications. Your hemoglobin a1c was 6.7 in the hospital.  Secondary Diagnosis:	Knee pain, right  Assessment and plan of treatment:	You have knee pain likely from osteoarthritis. Keep taking your pain medication as prescribed. And follow up with a orthopedist for monitor of disease progression.  Secondary Diagnosis:	Hip pain  Assessment and plan of treatment:	Continue taking your pain medications as prescribed. Please follow up with your scheduled appt steroid injections.

## 2018-12-10 NOTE — PROGRESS NOTE ADULT - PROBLEM SELECTOR PLAN 4
- A1C 6.7. Holding Tradjenta and Glimepiride while in hospital. s/p NPH 9 units  - Lantus 9 at bedtime, Lispro 3u TID ISS and FS.  - Consistent carb diet - A1C 6.7. Holding Tradjenta and Glimepiride while in hospital. s/p NPH 9 units  - Lantus 9 at bedtime, Lispro 3u TID with ISS and FS.  - Consistent carb low Na diet  -Nutrition eval.

## 2018-12-10 NOTE — DISCHARGE NOTE ADULT - HOSPITAL COURSE
78 year old woman history of HFpEF(last EF 66% with stage II diastolic dysfunction) on Lasix, HTN, T2DM, Breast Cancer s/p resection with known RUE lymphedema presents to the ED worsening b/l LE edema and pain. Daughter at bedside providing collateral information. Reports that patient has chronic LE edema for which she takes  Lasix 40mg PO QD. Reports worsening of LE edema x 1 weeks associated with blisters and increased redness in both legs. Denies fevers or chills. Denies CP or BHAT. Reports that if she lies flat she has SOB. No changes to amount of pillows used. Reports that she has been sleeping in a chair intermittently d/t pain in legs.  No changes in frequency of urination. No increased in salt intake.     Of note, patient reports right sided hip pain. Describes LE numbness with radiation up to the knee. Reports that she had outside MRI Spine which showed multiple slipped discs and was prescribed Percocet 7.5-325 1 tab q12h with no improvement in pain(started taking the medication 2 days ago). Reports history of right hip OA(describes recent imaging showing bone on bone) with plans for total hip replacement in the future. Walks with cane however reports that it is becoming more difficult. No  bowel or bladder incontinence.     In the ED patient received Lasix 20mg IV x1, Morphine 2mg IV x1 and Clindamycin 900mg IV x1.    Patient was seen by infectious disease 78 year old woman history of HFpEF(last EF 66% with stage II diastolic dysfunction) on Lasix, HTN, T2DM, Breast Cancer s/p resection with known RUE lymphedema presents to the ED worsening b/l LE edema and pain. Daughter at bedside providing collateral information. Reports that patient has chronic LE edema for which she takes  Lasix 40mg PO QD. Reports worsening of LE edema x 1 weeks associated with blisters and increased redness in both legs. Denies fevers or chills. Denies CP or BHAT. Reports that if she lies flat she has SOB. No changes to amount of pillows used. Reports that she has been sleeping in a chair intermittently d/t pain in legs.  No changes in frequency of urination. No increased in salt intake. Of note, patient reports right sided hip pain. Describes LE numbness with radiation up to the knee. Reports that she had outside MRI Spine which showed multiple slipped discs and was prescribed Percocet 7.5-325 1 tab q12h with no improvement in pain(started taking the medication 2 days ago). Reports history of right hip OA(describes recent imaging showing bone on bone) with plans for total hip replacement in the future. Walks with cane however reports that it is becoming more difficult. No  bowel or bladder incontinence. In the ED patient received Lasix 20mg IV x1, Morphine 2mg IV x1 and Clindamycin 900mg IV x1. Patient was seen by infectious disease 78 year old woman history of HFpEF(last EF 66% with stage II diastolic dysfunction) on Lasix, HTN, T2DM, Breast Cancer s/p resection with known RUE lymphedema presents to the ED worsening b/l LE edema and pain. Daughter at bedside providing collateral information. Reports that patient has chronic LE edema for which she takes  Lasix 40mg PO QD. Reports worsening of LE edema x 1 weeks associated with blisters and increased redness in both legs. Denies fevers or chills. Denies CP or BHAT. Reports that if she lies flat she has SOB. No changes to amount of pillows used. Reports that she has been sleeping in a chair intermittently d/t pain in legs.  No changes in frequency of urination. No increased in salt intake. Of note, patient reports right sided hip pain. Describes LE numbness with radiation up to the knee. Reports that she had outside MRI Spine which showed multiple slipped discs and was prescribed Percocet 7.5-325 1 tab q12h with no improvement in pain(started taking the medication 2 days ago). Reports history of right hip OA(describes recent imaging showing bone on bone) with plans for total hip replacement in the future. Walks with cane however reports that it is becoming more difficult. No  bowel or bladder incontinence. In the ED patient received Lasix 20mg IV x1, Morphine 2mg IV x1 and Clindamycin 900mg IV x1. Patient was seen by infectious disease which 78 year old woman history of HFpEF(last EF 66% with stage II diastolic dysfunction) on Lasix, HTN, T2DM, Breast Cancer s/p resection with known RUE lymphedema presents to the ED worsening b/l LE edema and pain. Daughter at bedside providing collateral information. Reports that patient has chronic LE edema for which she takes  Lasix 40mg PO QD. Reports worsening of LE edema x 1 weeks associated with blisters and increased redness in both legs. Denies fevers or chills. Denies CP or BHAT. Reports that if she lies flat she has SOB. No changes to amount of pillows used. Reports that she has been sleeping in a chair intermittently d/t pain in legs.  No changes in frequency of urination. No increased in salt intake. Of note, patient reports right sided hip pain. Describes LE numbness with radiation up to the knee. Reports that she had outside MRI Spine which showed multiple slipped discs and was prescribed Percocet 7.5-325 1 tab q12h with no improvement in pain(started taking the medication 2 days ago). Reports history of right hip OA(describes recent imaging showing bone on bone) with plans for total hip replacement in the future. Walks with cane however reports that it is becoming more difficult. No  bowel or bladder incontinence. In the ED patient received Lasix 20mg IV x1, Morphine 2mg IV x1 and Clindamycin 900mg IV x1. Patient leg swelling was not infectious in nature was monitored off antibiotics. Patient was diuresed with IV lasix, with appropriate response. Patient was transitioned to PO lasix. Patient recieved pain medication for hip and knee pain. Patient is medically stable for discharge with continued management at rehab.

## 2018-12-10 NOTE — PHYSICAL THERAPY INITIAL EVALUATION ADULT - ADDITIONAL COMMENTS
Pt lives in a private house alone w/ 3 steps to neg to enter. Pt amb w/ SC and RW PTA. Pt has a shower chair, RW and SC. Pt lives in a private house alone w/ 3 steps to neg to enter. Pt amb w/ SC and RW PTA. Pt has a shower chair, RW and SC. #[id number] Roxanna

## 2018-12-10 NOTE — PROGRESS NOTE ADULT - PROBLEM SELECTOR PLAN 6
DVT PPx: HSQ   Diet: Low Salt Diet, Consistent carb diet   Dispo: When pain improves. PT recommendation. DVT PPx: HSQ   Diet: Low Salt Diet, Consistent carb diet   Dispo: RAMIRO DVT PPx: SHAHAB AVILES to chair.   Diet: Low Salt Diet, Consistent carb diet   Dispo: RAMIRO

## 2018-12-10 NOTE — DISCHARGE NOTE ADULT - PLAN OF CARE
You were found with heart failure exacerbation likely due to not following a proper diet. You received diuretics to improve your fluid retention. Continue taking your metoprolol, isosorbide and hydralazine. Follow up with your cardiologist within two weeks for monitoring your hear failure and medications. Continue with low carbohydrate diet and continue taking your oral medications. Your hemoglobin a1c was 6.7 in the hospital. Improved You were found with heart failure exacerbation likely due to not following a proper diet. You had an echocardiogram which was unchanged from previous. You received diuretics to improve your fluid retention. During your hospital stay your atenolol was switched to metoprolol due to that is better for heart failure. You will continue taking your lasix 60mg daily at home and will follow a strict diet. Please follow up with your cardiologist within one week from discharge. You have knee pain likely from osteoarthritis. Keep taking your pain medication as prescribed. And follow up with a orthopedist for monitor of disease progression. Continue taking your pain medications as prescribed. Please follow up with your scheduled appt steroid injections.

## 2018-12-10 NOTE — PROGRESS NOTE ADULT - PROBLEM SELECTOR PLAN 1
Patient p/w worsening LE edema and signs of orthopnea.  Likely mild CHF exacerbation.   Lasix 20 mg IV BID. Fluid restriction. Strict I/Os. Daily weights.  ECHO ordered. Low salt diet.   Switch home Atenolol to Metoprolol tartrate 12.5 mg BID Patient reporting right sided hip/back pain. Likely related to OA. Negative Straight Leg raise.   - Also, reports RT knee pain. Will f/u uric acid level  Pain control: Acetaminophen 975 mg Q8H, Oxycodone 10mg q6h PRN for severe pain, Gabapentin 100mg BID.   SenKath palacios.   Encourage oob  Outpatient Orthopedics evaluation. Patient reporting right sided hip/back pain. Likely related to OA. Negative Straight Leg raise.   - Also, reports RT knee pain. Will f/u uric acid level  Pain control: Acetaminophen 975 mg Q8H standing, Oxycodone 10mg q6h PRN for severe pain, Gabapentin 100mg BID.   -Senna, Colace while on opiates.   -Encourage oob  -Outpatient Orthopedics evaluation.

## 2018-12-10 NOTE — PROGRESS NOTE ADULT - PROBLEM SELECTOR PLAN 5
At baseline.   Avoid nephrotoxins. Strict I/Os. At baseline.   Avoid nephrotoxins. Strict I/Os.  -Monitor BMP.

## 2018-12-10 NOTE — PROGRESS NOTE ADULT - PROBLEM SELECTOR PLAN 2
Patient reporting right sided hip/back pain. Likely related to OA. Negative Straight Leg raise.   Pain control: Oxycodone 10mg q6h PRN for severe pain, Gabapentin 100mg BID.   Sennaace.   Consider hip XR.   Outpatient Orthopedics evaluation. Patient p/w worsening LE edema and signs of orthopnea.  Likely mild CHF exacerbation.   - s/p Lasix 60 mg IV. Fluid restriction. Strict I/Os. Daily weights.  - Will need outpatient Echo   - Low salt diet.   Switch home Atenolol to Metoprolol tartrate 12.5 mg BID Patient p/w worsening LE edema and signs of orthopnea.  Likely mild CHF exacerbation. -Now improving.   - s/p Lasix 60 mg IV today. Fluid restriction. Strict I/Os. Daily weights. Will need an oral maintenance Lasix regimen upon DC.   - Will need outpatient Echo   - Low salt diet with fluid restriction.   -Switched home Atenolol to Metoprolol tartrate 12.5 mg BID for now

## 2018-12-10 NOTE — DISCHARGE NOTE ADULT - ADDITIONAL INSTRUCTIONS
Please follow up Dr. Hagan (Cardiologist) or Dr. Luciano Sahni (PMD) within one week from discharge.

## 2018-12-10 NOTE — PHYSICAL THERAPY INITIAL EVALUATION ADULT - STRENGTHENING, PT EVAL
GOAL: Patient will demonstrate a 1/3 increase in strength where deficient within 2 weeks to assist with performing functional mobility and ADLs.

## 2018-12-10 NOTE — PHYSICAL THERAPY INITIAL EVALUATION ADULT - PERTINENT HX OF CURRENT PROBLEM, REHAB EVAL
78 year old [place] speaking woman history of HFpEF(last EF 66% with stage II diastolic dysfunction) on Lasix, HTN, T2DM, Breast Cancer s/p resection with known RUE lymphedema presents to the ED worsening b/l LE edema and pain admitted with CHF exacerbation.

## 2018-12-10 NOTE — PHYSICAL THERAPY INITIAL EVALUATION ADULT - BALANCE TRAINING, PT EVAL
GOAL: Patient will demonstrate an increase in static/dynamic balance in sitting/standing where deficient by at least 1 grade within 2 weeks to facilitate greater independence during functional mobility and ADL's.

## 2018-12-10 NOTE — PROGRESS NOTE ADULT - ATTENDING COMMENTS
-Patient seen/examined on 12/10/18. Case/plan discussed with Dr. Vazquez as reviewed/edited by me above.  -GA planning to [facility].

## 2018-12-10 NOTE — PROGRESS NOTE ADULT - SUBJECTIVE AND OBJECTIVE BOX
Dr. Paras Vazquez  Internal Medicine PGY-1   Pager# [phone]    Patient is a 78y old  Female who presents with a chief complaint of lower extremity edema (09 Dec 2018 16:28)      SUBJECTIVE / OVERNIGHT EVENTS: No acute overnight events. Pt was HD stable.     MEDICATIONS  (STANDING):  atorvastatin 20 milliGRAM(s) Oral at bedtime  cholecalciferol 1000 Unit(s) Oral daily  dextrose 5%. 1000 milliLiter(s) (50 mL/Hr) IV Continuous <Continuous>  dextrose 50% Injectable 12.5 Gram(s) IV Push once  dextrose 50% Injectable 25 Gram(s) IV Push once  dextrose 50% Injectable 25 Gram(s) IV Push once  docusate sodium 100 milliGRAM(s) Oral three times a day  furosemide   Injectable 20 milliGRAM(s) IV Push every 12 hours  gabapentin 100 milliGRAM(s) Oral two times a day  heparin  Injectable 5000 Unit(s) SubCutaneous every 8 hours  hydrALAZINE 100 milliGRAM(s) Oral every 12 hours  insulin glargine Injectable (LANTUS) 9 Unit(s) SubCutaneous at bedtime  insulin lispro (HumaLOG) corrective regimen sliding scale   SubCutaneous three times a day before meals  insulin lispro (HumaLOG) corrective regimen sliding scale   SubCutaneous at bedtime  insulin lispro Injectable (HumaLOG) 3 Unit(s) SubCutaneous three times a day before meals  isosorbide   mononitrate ER Tablet (IMDUR) 60 milliGRAM(s) Oral daily  metoprolol tartrate 12.5 milliGRAM(s) Oral two times a day  senna 2 Tablet(s) Oral at bedtime    MEDICATIONS  (PRN):  acetaminophen   Tablet .. 650 milliGRAM(s) Oral every 6 hours PRN Mild Pain (1 - 3), Moderate Pain (4 - 6), Severe Pain (7 - 10)  dextrose 40% Gel 15 Gram(s) Oral once PRN Blood Glucose LESS THAN 70 milliGRAM(s)/deciliter  glucagon  Injectable 1 milliGRAM(s) IntraMuscular once PRN Glucose LESS THAN 70 milligrams/deciliter  oxyCODONE    IR 10 milliGRAM(s) Oral every 6 hours PRN Severe Pain (7 - 10)      Vital Signs Last 24 Hrs  T(C): 36.9 (10 Dec 2018 04:17), Max: 36.9 (10 Dec 2018 04:17)  T(F): 98.4 (10 Dec 2018 04:17), Max: 98.4 (10 Dec 2018 04:17)  HR: 60 (10 Dec 2018 04:17) (59 - 71)  BP: 116/47 (10 Dec 2018 04:17) (116/47 - 141/51)  BP(mean): --  RR: 18 (10 Dec 2018 04:17) (18 - 18)  SpO2: 93% (10 Dec 2018 04:17) (93% - 95%)  CAPILLARY BLOOD GLUCOSE      POCT Blood Glucose.: 263 mg/dL (09 Dec 2018 22:53)  POCT Blood Glucose.: 90 mg/dL (09 Dec 2018 17:31)  POCT Blood Glucose.: 255 mg/dL (09 Dec 2018 12:46)  POCT Blood Glucose.: 128 mg/dL (09 Dec 2018 08:36)    I&O's Summary    09 Dec 2018 07:01  -  10 Dec 2018 07:00  --------------------------------------------------------  IN: 1080 mL / OUT: 2100 mL / NET: -1020 mL        PHYSICAL EXAM:  GENERAL: NAD, well-developed  HEAD:  Atraumatic, Normocephalic  EYES: EOMI, PERRLA, conjunctiva and sclera clear  NECK: Supple, No JVD  CHEST/LUNG: Clear to auscultation bilaterally; No wheeze  HEART: Regular rate and rhythm; No murmurs, rubs, or gallops  ABDOMEN: Soft, Nontender, Nondistended; Bowel sounds present  EXTREMITIES:  2+ Peripheral Pulses, No clubbing, cyanosis, or edema  PSYCH: AAOx3  NEUROLOGY: non-focal  SKIN: No rashes or lesions    LABS:                        12.2   6.7   )-----------( 282      ( 09 Dec 2018 07:24 )             37.7     12-09    141  |  99  |  29<H>  ----------------------------<  89  3.7   |  29  |  1.71<H>    Ca    9.3      09 Dec 2018 07:20  Phos  4.1     12-09  Mg     2.1     12-09    TPro  7.4  /  Alb  3.7  /  TBili  0.3  /  DBili  x   /  AST  28  /  ALT  12  /  AlkPhos  63  12-08    PT/INR - ( 08 Dec 2018 12:24 )   PT: 10.8 sec;   INR: 0.94 ratio         PTT - ( 08 Dec 2018 12:24 )  PTT:28.5 sec          RADIOLOGY & ADDITIONAL TESTS:    Imaging Personally Reviewed:    Consultant(s) Notes Reviewed:      Care Discussed with Consultants/Other Providers: Dr. Paras Vazquez  Internal Medicine PGY-1   Pager# [phone]    Patient is a 78y old  Female who presents with a chief complaint of lower extremity edema (09 Dec 2018 16:28)      SUBJECTIVE / OVERNIGHT EVENTS: No acute overnight events. Pt was HD stable. Reports RT knee pain. Denies SOB/n/v/f/c/h/d.    MEDICATIONS  (STANDING):  atorvastatin 20 milliGRAM(s) Oral at bedtime  cholecalciferol 1000 Unit(s) Oral daily  dextrose 5%. 1000 milliLiter(s) (50 mL/Hr) IV Continuous <Continuous>  dextrose 50% Injectable 12.5 Gram(s) IV Push once  dextrose 50% Injectable 25 Gram(s) IV Push once  dextrose 50% Injectable 25 Gram(s) IV Push once  docusate sodium 100 milliGRAM(s) Oral three times a day  furosemide   Injectable 20 milliGRAM(s) IV Push every 12 hours  gabapentin 100 milliGRAM(s) Oral two times a day  heparin  Injectable 5000 Unit(s) SubCutaneous every 8 hours  hydrALAZINE 100 milliGRAM(s) Oral every 12 hours  insulin glargine Injectable (LANTUS) 9 Unit(s) SubCutaneous at bedtime  insulin lispro (HumaLOG) corrective regimen sliding scale   SubCutaneous three times a day before meals  insulin lispro (HumaLOG) corrective regimen sliding scale   SubCutaneous at bedtime  insulin lispro Injectable (HumaLOG) 3 Unit(s) SubCutaneous three times a day before meals  isosorbide   mononitrate ER Tablet (IMDUR) 60 milliGRAM(s) Oral daily  metoprolol tartrate 12.5 milliGRAM(s) Oral two times a day  senna 2 Tablet(s) Oral at bedtime    MEDICATIONS  (PRN):  acetaminophen   Tablet .. 650 milliGRAM(s) Oral every 6 hours PRN Mild Pain (1 - 3), Moderate Pain (4 - 6), Severe Pain (7 - 10)  dextrose 40% Gel 15 Gram(s) Oral once PRN Blood Glucose LESS THAN 70 milliGRAM(s)/deciliter  glucagon  Injectable 1 milliGRAM(s) IntraMuscular once PRN Glucose LESS THAN 70 milligrams/deciliter  oxyCODONE    IR 10 milliGRAM(s) Oral every 6 hours PRN Severe Pain (7 - 10)      Vital Signs Last 24 Hrs  T(C): 36.9 (10 Dec 2018 04:17), Max: 36.9 (10 Dec 2018 04:17)  T(F): 98.4 (10 Dec 2018 04:17), Max: 98.4 (10 Dec 2018 04:17)  HR: 60 (10 Dec 2018 04:17) (59 - 71)  BP: 116/47 (10 Dec 2018 04:17) (116/47 - 141/51)  BP(mean): --  RR: 18 (10 Dec 2018 04:17) (18 - 18)  SpO2: 93% (10 Dec 2018 04:17) (93% - 95%)  CAPILLARY BLOOD GLUCOSE      POCT Blood Glucose.: 263 mg/dL (09 Dec 2018 22:53)  POCT Blood Glucose.: 90 mg/dL (09 Dec 2018 17:31)  POCT Blood Glucose.: 255 mg/dL (09 Dec 2018 12:46)  POCT Blood Glucose.: 128 mg/dL (09 Dec 2018 08:36)    I&O's Summary    09 Dec 2018 07:01  -  10 Dec 2018 07:00  --------------------------------------------------------  IN: 1080 mL / OUT: 2100 mL / NET: -1020 mL        PHYSICAL EXAM:  GENERAL: NAD, well-developed  CHEST/LUNG: Clear to auscultation bilaterally; No wheeze  HEART: Regular rate and rhythm; Normal S1 S2, No murmurs, rubs, or gallops  ABDOMEN: Soft, Nontender, Nondistended; Bowel sounds present  EXTREMITIES:  2+ Peripheral Pulses, b/l 1+ pitting edema. Limited ROM in hip due to pain  PSYCH: AAOx3  NEUROLOGY: non-focal  SKIN: No rashes or lesions    LABS:                        12.2   6.7   )-----------( 282      ( 09 Dec 2018 07:24 )             37.7     12-09    141  |  99  |  29<H>  ----------------------------<  89  3.7   |  29  |  1.71<H>    Ca    9.3      09 Dec 2018 07:20  Phos  4.1     12-09  Mg     2.1     12-09    TPro  7.4  /  Alb  3.7  /  TBili  0.3  /  DBili  x   /  AST  28  /  ALT  12  /  AlkPhos  63  12-08    PT/INR - ( 08 Dec 2018 12:24 )   PT: 10.8 sec;   INR: 0.94 ratio         PTT - ( 08 Dec 2018 12:24 )  PTT:28.5 sec          RADIOLOGY & ADDITIONAL TESTS: None ordered

## 2018-12-10 NOTE — DISCHARGE NOTE ADULT - MEDICATION SUMMARY - MEDICATIONS TO TAKE
I will START or STAY ON the medications listed below when I get home from the hospital:    oxyCODONE 10 mg oral tablet, extended release  -- 1 tab(s) by mouth every 12 hours  -- Indication: For Hip pain    oxyCODONE 5 mg oral tablet  -- 1 tab(s) by mouth every 6 hours, As needed, Severe Pain (7 - 10)  -- Indication: For Hip pain    Actamin 325 mg oral tablet  -- 3 tab(s) by mouth every 8 hours  -- Indication: For Hip pain    isosorbide mononitrate 60 mg oral tablet, extended release  -- 1 tab(s) by mouth once a day (in the morning)  -- Indication: For Acute on chronic diastolic CHF (congestive heart failure)    gabapentin 100 mg oral capsule  -- 1 tab(s) by mouth once a day (at bedtime)  -- Indication: For Prophylactic measure    glimepiride 4 mg oral tablet  -- 1 tab(s) by mouth once a day  -- Indication: For Type II diabetes mellitus    Tradjenta 5 mg oral tablet  -- 1 tab(s) by mouth once a day  -- Indication: For Type II diabetes mellitus    atorvastatin 20 mg oral tablet  -- 1 tab(s) by mouth once a day (at bedtime)  -- Indication: For Prophylactic measure    metoprolol succinate 25 mg oral tablet, extended release  -- 1 tab(s) by mouth once a day  -- Indication: For Acute on chronic diastolic CHF (congestive heart failure)    furosemide 20 mg oral tablet  -- 3 tab(s) by mouth once a day  -- Indication: For Acute on chronic diastolic CHF (congestive heart failure)    docusate sodium 100 mg oral capsule  -- 1 cap(s) by mouth 3 times a day  -- Indication: For Prophylactic measure    senna oral tablet  -- 2 tab(s) by mouth once a day (at bedtime)  -- Indication: For Prophylactic measure    hydrALAZINE 100 mg oral tablet  -- 1 tab(s) by mouth 2 times a day  -- Indication: For HTN (hypertension)    Vitamin D3 2000 intl units oral tablet  -- 1 tab(s) by mouth once a day  -- Indication: For Prophylactic measure    Vitamin B-12  -- Indication: For Prophylactic measure I will START or STAY ON the medications listed below when I get home from the hospital:    oxyCODONE 10 mg oral tablet, extended release  -- 1 tab(s) by mouth every 12 hours  -- Indication: For Hip pain    oxyCODONE 5 mg oral tablet  -- 1 tab(s) by mouth every 6 hours, As needed, Severe Pain (7 - 10)  -- Indication: For Hip pain    Actamin 325 mg oral tablet  -- 3 tab(s) by mouth every 8 hours  -- Indication: For Hip pain    isosorbide mononitrate 60 mg oral tablet, extended release  -- 1 tab(s) by mouth once a day (in the morning)  -- Indication: For Acute on chronic diastolic CHF (congestive heart failure)    gabapentin 100 mg oral capsule  -- 1 tab(s) by mouth once a day (at bedtime)  -- Indication: For Prophylactic measure    glimepiride 4 mg oral tablet  -- 1 tab(s) by mouth once a day  -- Indication: For Type II diabetes mellitus    Tradjenta 5 mg oral tablet  -- 1 tab(s) by mouth once a day  -- Indication: For Type II diabetes mellitus    atorvastatin 20 mg oral tablet  -- 1 tab(s) by mouth once a day (at bedtime)  -- Indication: For Prophylactic measure    metoprolol succinate 25 mg oral tablet, extended release  -- 1 tab(s) by mouth once a day  -- Indication: For Acute on chronic diastolic CHF (congestive heart failure)    furosemide 20 mg oral tablet  -- 3 tab(s) by mouth once a day  -- Indication: For Acute on chronic diastolic CHF (congestive heart failure)    docusate sodium 100 mg oral capsule  -- 1 cap(s) by mouth 3 times a day  -- Indication: For Prophylactic measure    senna oral tablet  -- 2 tab(s) by mouth once a day (at bedtime)  -- Indication: For Prophylactic measure    hydrALAZINE 100 mg oral tablet  -- 1 tab(s) by mouth 2 times a day  -- Indication: For HTN (hypertension)    Vitamin D3 2000 intl units oral tablet  -- 1 tab(s) by mouth once a day  -- Indication: For Prophylactic measure    Vitamin B-12  -- 1 tab(s) by mouth once a day  -- Indication: For Prophylactic measure

## 2018-12-11 LAB
ANION GAP SERPL CALC-SCNC: 14 MMOL/L — SIGNIFICANT CHANGE UP (ref 5–17)
BUN SERPL-MCNC: 33 MG/DL — HIGH (ref 7–23)
CALCIUM SERPL-MCNC: 9.5 MG/DL — SIGNIFICANT CHANGE UP (ref 8.4–10.5)
CHLORIDE SERPL-SCNC: 97 MMOL/L — SIGNIFICANT CHANGE UP (ref 96–108)
CO2 SERPL-SCNC: 29 MMOL/L — SIGNIFICANT CHANGE UP (ref 22–31)
CREAT SERPL-MCNC: 1.84 MG/DL — HIGH (ref 0.5–1.3)
GLUCOSE BLDC GLUCOMTR-MCNC: 118 MG/DL — HIGH (ref 70–99)
GLUCOSE BLDC GLUCOMTR-MCNC: 181 MG/DL — HIGH (ref 70–99)
GLUCOSE BLDC GLUCOMTR-MCNC: 193 MG/DL — HIGH (ref 70–99)
GLUCOSE BLDC GLUCOMTR-MCNC: 206 MG/DL — HIGH (ref 70–99)
GLUCOSE BLDC GLUCOMTR-MCNC: 219 MG/DL — HIGH (ref 70–99)
GLUCOSE SERPL-MCNC: 114 MG/DL — HIGH (ref 70–99)
POTASSIUM SERPL-MCNC: 4.1 MMOL/L — SIGNIFICANT CHANGE UP (ref 3.5–5.3)
POTASSIUM SERPL-SCNC: 4.1 MMOL/L — SIGNIFICANT CHANGE UP (ref 3.5–5.3)
SODIUM SERPL-SCNC: 140 MMOL/L — SIGNIFICANT CHANGE UP (ref 135–145)
URATE SERPL-MCNC: 7.4 MG/DL — HIGH (ref 2.5–7)
URATE UR-MCNC: 59 MG/DL — SIGNIFICANT CHANGE UP

## 2018-12-11 PROCEDURE — 99232 SBSQ HOSP IP/OBS MODERATE 35: CPT | Mod: GC

## 2018-12-11 RX ORDER — METOPROLOL TARTRATE 50 MG
12.5 TABLET ORAL ONCE
Qty: 0 | Refills: 0 | Status: COMPLETED | OUTPATIENT
Start: 2018-12-11 | End: 2018-12-11

## 2018-12-11 RX ORDER — FUROSEMIDE 40 MG
60 TABLET ORAL ONCE
Qty: 0 | Refills: 0 | Status: COMPLETED | OUTPATIENT
Start: 2018-12-11 | End: 2018-12-11

## 2018-12-11 RX ORDER — METOPROLOL TARTRATE 50 MG
25 TABLET ORAL DAILY
Qty: 0 | Refills: 0 | Status: DISCONTINUED | OUTPATIENT
Start: 2018-12-12 | End: 2018-12-12

## 2018-12-11 RX ORDER — METOPROLOL TARTRATE 50 MG
25 TABLET ORAL DAILY
Qty: 0 | Refills: 0 | Status: DISCONTINUED | OUTPATIENT
Start: 2018-12-11 | End: 2018-12-11

## 2018-12-11 RX ORDER — SENNA PLUS 8.6 MG/1
2 TABLET ORAL
Qty: 0 | Refills: 0 | DISCHARGE
Start: 2018-12-11

## 2018-12-11 RX ORDER — DOCUSATE SODIUM 100 MG
1 CAPSULE ORAL
Qty: 0 | Refills: 0 | DISCHARGE
Start: 2018-12-11

## 2018-12-11 RX ORDER — FUROSEMIDE 40 MG
2 TABLET ORAL
Qty: 0 | Refills: 0 | COMMUNITY

## 2018-12-11 RX ORDER — COLCHICINE 0.6 MG
0.3 TABLET ORAL ONCE
Qty: 0 | Refills: 0 | Status: COMPLETED | OUTPATIENT
Start: 2018-12-11 | End: 2018-12-11

## 2018-12-11 RX ADMIN — Medication 100 MILLIGRAM(S): at 22:30

## 2018-12-11 RX ADMIN — HEPARIN SODIUM 5000 UNIT(S): 5000 INJECTION INTRAVENOUS; SUBCUTANEOUS at 05:37

## 2018-12-11 RX ADMIN — OXYCODONE HYDROCHLORIDE 10 MILLIGRAM(S): 5 TABLET ORAL at 11:11

## 2018-12-11 RX ADMIN — HEPARIN SODIUM 5000 UNIT(S): 5000 INJECTION INTRAVENOUS; SUBCUTANEOUS at 22:30

## 2018-12-11 RX ADMIN — GABAPENTIN 100 MILLIGRAM(S): 400 CAPSULE ORAL at 05:35

## 2018-12-11 RX ADMIN — Medication 975 MILLIGRAM(S): at 22:30

## 2018-12-11 RX ADMIN — Medication 0.3 MILLIGRAM(S): at 14:12

## 2018-12-11 RX ADMIN — Medication 975 MILLIGRAM(S): at 05:34

## 2018-12-11 RX ADMIN — Medication 100 MILLIGRAM(S): at 17:30

## 2018-12-11 RX ADMIN — Medication 3 UNIT(S): at 13:27

## 2018-12-11 RX ADMIN — Medication 100 MILLIGRAM(S): at 05:35

## 2018-12-11 RX ADMIN — Medication 12.5 MILLIGRAM(S): at 14:11

## 2018-12-11 RX ADMIN — SENNA PLUS 2 TABLET(S): 8.6 TABLET ORAL at 22:30

## 2018-12-11 RX ADMIN — Medication 975 MILLIGRAM(S): at 13:29

## 2018-12-11 RX ADMIN — Medication 3 UNIT(S): at 18:07

## 2018-12-11 RX ADMIN — Medication 60 MILLIGRAM(S): at 13:30

## 2018-12-11 RX ADMIN — INSULIN GLARGINE 9 UNIT(S): 100 INJECTION, SOLUTION SUBCUTANEOUS at 22:31

## 2018-12-11 RX ADMIN — Medication 3 UNIT(S): at 09:21

## 2018-12-11 RX ADMIN — OXYCODONE HYDROCHLORIDE 10 MILLIGRAM(S): 5 TABLET ORAL at 13:06

## 2018-12-11 RX ADMIN — Medication 1000 UNIT(S): at 13:29

## 2018-12-11 RX ADMIN — Medication 100 MILLIGRAM(S): at 05:36

## 2018-12-11 RX ADMIN — GABAPENTIN 100 MILLIGRAM(S): 400 CAPSULE ORAL at 17:29

## 2018-12-11 RX ADMIN — Medication 1: at 13:29

## 2018-12-11 RX ADMIN — Medication 12.5 MILLIGRAM(S): at 05:34

## 2018-12-11 RX ADMIN — HEPARIN SODIUM 5000 UNIT(S): 5000 INJECTION INTRAVENOUS; SUBCUTANEOUS at 13:29

## 2018-12-11 RX ADMIN — ATORVASTATIN CALCIUM 20 MILLIGRAM(S): 80 TABLET, FILM COATED ORAL at 22:30

## 2018-12-11 RX ADMIN — Medication 2: at 18:07

## 2018-12-11 RX ADMIN — ISOSORBIDE MONONITRATE 60 MILLIGRAM(S): 60 TABLET, EXTENDED RELEASE ORAL at 13:29

## 2018-12-11 RX ADMIN — Medication 100 MILLIGRAM(S): at 13:30

## 2018-12-11 NOTE — PROGRESS NOTE ADULT - PROBLEM SELECTOR PLAN 2
Patient p/w worsening LE edema and signs of orthopnea.  Likely mild CHF exacerbation. -Now improving.   - s/p Lasix 60 mg. Fluid restriction. Strict I/Os. Daily weights.   - Will need outpatient Echo   - Low salt diet with fluid restriction.   - Switched home Atenolol to Metoprolol tartrate 12.5 mg BID for now  - Will need an oral maintenance Lasix regimen upon DC. Patient p/w worsening LE edema and signs of orthopnea.  Likely mild CHF exacerbation. -Now improving.   - s/p Lasix 60 mg. Fluid restriction. Strict I/Os. Daily weights.   - Echo- annular mitral calcification, normal LT ventricular systolic function, no wall motion abnormalities. EF- 60%  - Low salt diet with fluid restriction.   - Switched home Atenolol to Metoprolol succinate 25 mg daily  - outpatient d/c with Lasix 60 mg oral  - Will f/u outpatient with Dr. Flynn (cardiologist)

## 2018-12-11 NOTE — PROGRESS NOTE ADULT - PROBLEM SELECTOR PLAN 5
At baseline.   Avoid nephrotoxins. Strict I/Os.  -Monitor BMP. -Creatinine increasing somewhat likely due to lasix/diuresis.   Avoid nephrotoxins. Strict I/Os.  -Monitor BMP.

## 2018-12-11 NOTE — PROGRESS NOTE ADULT - PROBLEM SELECTOR PLAN 1
Patient reporting right sided hip/back pain. Likely related to OA. Negative Straight Leg raise.   - Also, reports RT knee pain.  - Pain control: Acetaminophen 975 mg Q8H standing, Oxycodone 10mg q6h PRN for severe pain, Gabapentin 100mg BID.   -Senna, Colace while on opiates.   -Encourage oob  -Outpatient Orthopedics evaluation.  - Will f/u uric acid level Patient reporting right sided hip/back pain. Likely related to OA. Negative Straight Leg raise.   - Also, reports RT knee pain.  - Pain control: Acetaminophen 975 mg Q8H standing, Oxycodone 10mg q6h PRN for severe pain, Gabapentin 100mg BID.   -Sennaace while on opiates.   -Encourage oob  -Outpatient Orthopedics evaluation.  - Per daughter, pt had MRI of her spine done at [facility] that was significant for disc herniation and pinched nerve. She has radicular pain when ambulating. Pt has been following with [facility] and was started on oxycodone 7.5 mg BID. She has a f/u appt scheduled for next week to discuss steroid injections.   - Uric acid level- 7.4. Will try 0.3 mg colchicine Patient reporting right sided hip/back pain. Likely related to OA. Negative Straight Leg raise.   - Also, reports RT knee pain.  - Pain control: Acetaminophen 975 mg Q8H standing, Oxycodone 10mg q6h PRN for severe pain, Gabapentin 100mg BID.   -Sennaace while on opiates.   -Encourage oob  -Outpatient Orthopedics evaluation.  - Per daughter, pt had MRI of her spine done at [facility] that was significant for disc herniation and pinched nerve. She has radicular pain when ambulating. Pt has been following with Spine management center and was started on oxycodone 7.5 mg BID. She has a f/u appt scheduled for next week to discuss steroid injections.   - Uric acid level- 7.4. Will try 0.3 mg colchicine x 1.

## 2018-12-11 NOTE — PROGRESS NOTE ADULT - PROBLEM SELECTOR PLAN 6
DVT PPx: SHAHAB AVILES to chair.   Diet: Low Salt Diet, Consistent carb diet   Dispo: RAMIRO DVT PPx: SHAHAB   OOB to chair.   Diet: Low Salt Diet, Consistent carb diet   Dispo: RAMIRO

## 2018-12-11 NOTE — PROGRESS NOTE ADULT - PROBLEM SELECTOR PLAN 4
- A1C 6.7. Holding Tradjenta and Glimepiride while in hospital. s/p NPH 9 units  - Lantus 9 at bedtime, Lispro 3u TID with ISS and FS.  - Consistent carb low Na diet  -Nutrition eval. - A1C 6.7. Holding Tradjenta and Glimepiride while in hospital. s/p NPH 9 units  - Lantus 9 units at bedtime, Lispro 3u TID with ISS and FS. -Will adjust PRN.   - Consistent carb low Na diet  -Nutrition eval.

## 2018-12-11 NOTE — PROGRESS NOTE ADULT - PROBLEM SELECTOR PLAN 3
Controlled.    Continue home Hydralazine 100mg BID, Isosorbide Mononitrate ER 60mg QD. -Mostly acceptable for her age.   Continue home Hydralazine 100mg BID, Isosorbide Mononitrate ER 60mg QD.

## 2018-12-11 NOTE — PROGRESS NOTE ADULT - SUBJECTIVE AND OBJECTIVE BOX
Dr. Paras Vazquez  Internal Medicine PGY-1   Pager# [phone]    Patient is a 78y old  Female who presents with a chief complaint of lower extremity edema (10 Dec 2018 14:09)      SUBJECTIVE / OVERNIGHT EVENTS: No acute events overnight. Overnight, her BP was elevated to 157/67 and subsequently downtrended to 145/61. Otherwise, HD stable.     MEDICATIONS  (STANDING):  acetaminophen   Tablet .. 975 milliGRAM(s) Oral every 8 hours  atorvastatin 20 milliGRAM(s) Oral at bedtime  cholecalciferol 1000 Unit(s) Oral daily  dextrose 5%. 1000 milliLiter(s) (50 mL/Hr) IV Continuous <Continuous>  dextrose 50% Injectable 12.5 Gram(s) IV Push once  dextrose 50% Injectable 25 Gram(s) IV Push once  dextrose 50% Injectable 25 Gram(s) IV Push once  docusate sodium 100 milliGRAM(s) Oral three times a day  gabapentin 100 milliGRAM(s) Oral two times a day  heparin  Injectable 5000 Unit(s) SubCutaneous every 8 hours  hydrALAZINE 100 milliGRAM(s) Oral every 12 hours  insulin glargine Injectable (LANTUS) 9 Unit(s) SubCutaneous at bedtime  insulin lispro (HumaLOG) corrective regimen sliding scale   SubCutaneous three times a day before meals  insulin lispro (HumaLOG) corrective regimen sliding scale   SubCutaneous at bedtime  insulin lispro Injectable (HumaLOG) 3 Unit(s) SubCutaneous three times a day before meals  isosorbide   mononitrate ER Tablet (IMDUR) 60 milliGRAM(s) Oral daily  metoprolol tartrate 12.5 milliGRAM(s) Oral two times a day  senna 2 Tablet(s) Oral at bedtime    MEDICATIONS  (PRN):  dextrose 40% Gel 15 Gram(s) Oral once PRN Blood Glucose LESS THAN 70 milliGRAM(s)/deciliter  glucagon  Injectable 1 milliGRAM(s) IntraMuscular once PRN Glucose LESS THAN 70 milligrams/deciliter  oxyCODONE    IR 10 milliGRAM(s) Oral every 6 hours PRN Severe Pain (7 - 10)      Vital Signs Last 24 Hrs  T(C): 36.8 (11 Dec 2018 04:21), Max: 37.1 (10 Dec 2018 14:43)  T(F): 98.2 (11 Dec 2018 04:21), Max: 98.7 (10 Dec 2018 14:43)  HR: 61 (11 Dec 2018 04:21) (61 - 79)  BP: 145/61 (11 Dec 2018 04:21) (116/53 - 157/67)  BP(mean): --  RR: 18 (11 Dec 2018 04:21) (18 - 18)  SpO2: 96% (11 Dec 2018 04:21) (94% - 96%)  CAPILLARY BLOOD GLUCOSE      POCT Blood Glucose.: 166 mg/dL (10 Dec 2018 21:44)  POCT Blood Glucose.: 156 mg/dL (10 Dec 2018 17:28)  POCT Blood Glucose.: 180 mg/dL (10 Dec 2018 13:20)  POCT Blood Glucose.: 132 mg/dL (10 Dec 2018 08:33)    I&O's Summary    10 Dec 2018 07:01  -  11 Dec 2018 07:00  --------------------------------------------------------  IN: 480 mL / OUT: 2900 mL / NET: -2420 mL        PHYSICAL EXAM:  GENERAL: NAD, well-developed  HEAD:  Atraumatic, Normocephalic  EYES: EOMI, PERRLA, conjunctiva and sclera clear  NECK: Supple, No JVD  CHEST/LUNG: Clear to auscultation bilaterally; No wheeze  HEART: Regular rate and rhythm; No murmurs, rubs, or gallops  ABDOMEN: Soft, Nontender, Nondistended; Bowel sounds present  EXTREMITIES:  2+ Peripheral Pulses, No clubbing, cyanosis, or edema  PSYCH: AAOx3  NEUROLOGY: non-focal  SKIN: No rashes or lesions    LABS:                        11.7   6.5   )-----------( 274      ( 10 Dec 2018 07:09 )             35.8     12-10    140  |  99  |  31<H>  ----------------------------<  76  3.3<L>   |  30  |  1.74<H>    Ca    9.1      10 Dec 2018 07:08  Phos  4.2     12-10  Mg     2.0     12-10                RADIOLOGY & ADDITIONAL TESTS:    Imaging Personally Reviewed:    Consultant(s) Notes Reviewed:      Care Discussed with Consultants/Other Providers: Dr. Paras Vazquez  Internal Medicine PGY-1   Pager# [phone]    Patient is a 78y old  Female who presents with a chief complaint of lower extremity edema (10 Dec 2018 14:09)      SUBJECTIVE / OVERNIGHT EVENTS: No acute events overnight. Overnight, her BP was elevated to 157/67 and subsequently downtrended to 145/61. Otherwise, HD stable. Denies SOB and reports that his swelling has decreased. Still has RT knee pain.     MEDICATIONS  (STANDING):  acetaminophen   Tablet .. 975 milliGRAM(s) Oral every 8 hours  atorvastatin 20 milliGRAM(s) Oral at bedtime  cholecalciferol 1000 Unit(s) Oral daily  dextrose 5%. 1000 milliLiter(s) (50 mL/Hr) IV Continuous <Continuous>  dextrose 50% Injectable 12.5 Gram(s) IV Push once  dextrose 50% Injectable 25 Gram(s) IV Push once  dextrose 50% Injectable 25 Gram(s) IV Push once  docusate sodium 100 milliGRAM(s) Oral three times a day  gabapentin 100 milliGRAM(s) Oral two times a day  heparin  Injectable 5000 Unit(s) SubCutaneous every 8 hours  hydrALAZINE 100 milliGRAM(s) Oral every 12 hours  insulin glargine Injectable (LANTUS) 9 Unit(s) SubCutaneous at bedtime  insulin lispro (HumaLOG) corrective regimen sliding scale   SubCutaneous three times a day before meals  insulin lispro (HumaLOG) corrective regimen sliding scale   SubCutaneous at bedtime  insulin lispro Injectable (HumaLOG) 3 Unit(s) SubCutaneous three times a day before meals  isosorbide   mononitrate ER Tablet (IMDUR) 60 milliGRAM(s) Oral daily  metoprolol tartrate 12.5 milliGRAM(s) Oral two times a day  senna 2 Tablet(s) Oral at bedtime    MEDICATIONS  (PRN):  dextrose 40% Gel 15 Gram(s) Oral once PRN Blood Glucose LESS THAN 70 milliGRAM(s)/deciliter  glucagon  Injectable 1 milliGRAM(s) IntraMuscular once PRN Glucose LESS THAN 70 milligrams/deciliter  oxyCODONE    IR 10 milliGRAM(s) Oral every 6 hours PRN Severe Pain (7 - 10)      Vital Signs Last 24 Hrs  T(C): 36.8 (11 Dec 2018 04:21), Max: 37.1 (10 Dec 2018 14:43)  T(F): 98.2 (11 Dec 2018 04:21), Max: 98.7 (10 Dec 2018 14:43)  HR: 61 (11 Dec 2018 04:21) (61 - 79)  BP: 145/61 (11 Dec 2018 04:21) (116/53 - 157/67)  BP(mean): --  RR: 18 (11 Dec 2018 04:21) (18 - 18)  SpO2: 96% (11 Dec 2018 04:21) (94% - 96%)  CAPILLARY BLOOD GLUCOSE      POCT Blood Glucose.: 166 mg/dL (10 Dec 2018 21:44)  POCT Blood Glucose.: 156 mg/dL (10 Dec 2018 17:28)  POCT Blood Glucose.: 180 mg/dL (10 Dec 2018 13:20)  POCT Blood Glucose.: 132 mg/dL (10 Dec 2018 08:33)    I&O's Summary    10 Dec 2018 07:01  -  11 Dec 2018 07:00  --------------------------------------------------------  IN: 480 mL / OUT: 2900 mL / NET: -2420 mL    PHYSICAL EXAM:  GENERAL: NAD, well-developed  CHEST/LUNG: Clear to auscultation bilaterally; No wheeze  HEART: Regular rate and rhythm; Normal S1 S2, No murmurs, rubs, or gallops  ABDOMEN: Soft, Nontender, Nondistended; Bowel sounds present  EXTREMITIES:  2+ Peripheral Pulses, b/l LE edema improving and tender to touch. Limited ROM in hip due to pain  PSYCH: AAOx3  NEUROLOGY: non-focal        LABS:                        11.7   6.5   )-----------( 274      ( 10 Dec 2018 07:09 )             35.8     12-10    140  |  99  |  31<H>  ----------------------------<  76  3.3<L>   |  30  |  1.74<H>    Ca    9.1      10 Dec 2018 07:08  Phos  4.2     12-10  Mg     2.0     12-10                RADIOLOGY & ADDITIONAL TESTS:    Imaging Personally Reviewed: none ordered Dr. Paras Vazquez  Internal Medicine PGY-1   Pager# [phone]    Patient is a 78y old  Female who presents with a chief complaint of lower extremity edema (10 Dec 2018 14:09)      SUBJECTIVE / OVERNIGHT EVENTS: No acute events overnight. Overnight, her BP was elevated to 157/67 and subsequently downtrended to 145/61. Otherwise, HD stable. Denies SOB and reports that his swelling has decreased. Still has RT knee pain.     MEDICATIONS  (STANDING):  acetaminophen   Tablet .. 975 milliGRAM(s) Oral every 8 hours  atorvastatin 20 milliGRAM(s) Oral at bedtime  cholecalciferol 1000 Unit(s) Oral daily  dextrose 5%. 1000 milliLiter(s) (50 mL/Hr) IV Continuous <Continuous>  dextrose 50% Injectable 12.5 Gram(s) IV Push once  dextrose 50% Injectable 25 Gram(s) IV Push once  dextrose 50% Injectable 25 Gram(s) IV Push once  docusate sodium 100 milliGRAM(s) Oral three times a day  gabapentin 100 milliGRAM(s) Oral two times a day  heparin  Injectable 5000 Unit(s) SubCutaneous every 8 hours  hydrALAZINE 100 milliGRAM(s) Oral every 12 hours  insulin glargine Injectable (LANTUS) 9 Unit(s) SubCutaneous at bedtime  insulin lispro (HumaLOG) corrective regimen sliding scale   SubCutaneous three times a day before meals  insulin lispro (HumaLOG) corrective regimen sliding scale   SubCutaneous at bedtime  insulin lispro Injectable (HumaLOG) 3 Unit(s) SubCutaneous three times a day before meals  isosorbide   mononitrate ER Tablet (IMDUR) 60 milliGRAM(s) Oral daily  metoprolol tartrate 12.5 milliGRAM(s) Oral two times a day  senna 2 Tablet(s) Oral at bedtime    MEDICATIONS  (PRN):  dextrose 40% Gel 15 Gram(s) Oral once PRN Blood Glucose LESS THAN 70 milliGRAM(s)/deciliter  glucagon  Injectable 1 milliGRAM(s) IntraMuscular once PRN Glucose LESS THAN 70 milligrams/deciliter  oxyCODONE    IR 10 milliGRAM(s) Oral every 6 hours PRN Severe Pain (7 - 10)      Vital Signs Last 24 Hrs  T(C): 36.8 (11 Dec 2018 04:21), Max: 37.1 (10 Dec 2018 14:43)  T(F): 98.2 (11 Dec 2018 04:21), Max: 98.7 (10 Dec 2018 14:43)  HR: 61 (11 Dec 2018 04:21) (61 - 79)  BP: 145/61 (11 Dec 2018 04:21) (116/53 - 157/67)  BP(mean): --  RR: 18 (11 Dec 2018 04:21) (18 - 18)  SpO2: 96% (11 Dec 2018 04:21) (94% - 96%)  CAPILLARY BLOOD GLUCOSE      POCT Blood Glucose.: 166 mg/dL (10 Dec 2018 21:44)  POCT Blood Glucose.: 156 mg/dL (10 Dec 2018 17:28)  POCT Blood Glucose.: 180 mg/dL (10 Dec 2018 13:20)  POCT Blood Glucose.: 132 mg/dL (10 Dec 2018 08:33)    I&O's Summary    10 Dec 2018 07:01  -  11 Dec 2018 07:00  --------------------------------------------------------  IN: 480 mL / OUT: 2900 mL / NET: -2420 mL    PHYSICAL EXAM:  GENERAL: NAD, well-developed  CHEST/LUNG: Clear to auscultation bilaterally; No wheeze  HEART: Regular rate and rhythm; Normal S1 S2, No murmurs, rubs, or gallops  ABDOMEN: Soft, Nontender, Nondistended; Bowel sounds present  EXTREMITIES:  2+ Peripheral Pulses, b/l LE edema improving and tender to touch. Limited ROM in hip due to pain  PSYCH: AAOx3  NEUROLOGY: non-focal        LABS:                        11.7   6.5   )-----------( 274      ( 10 Dec 2018 07:09 )             35.8     12-10    140  |  99  |  31<H>  ----------------------------<  76  3.3<L>   |  30  |  1.74<H>    Ca    9.1      10 Dec 2018 07:08  Phos  4.2     12-10  Mg     2.0     12-10                RADIOLOGY & ADDITIONAL TESTS:    Imaging Personally Reviewed: none ordered      < from: Transthoracic Echocardiogram (12.10.18 @ 17:30) >  Conclusions:  1. Mitral annular calcification, otherwise normal mitral  valve.  2. Peak transaortic valve gradient equals 14 mm Hg, mean  transaortic valve gradient equals 8 mm Hg, estimated aortic  valve area equals 1.6 sqcm (by continuity equation), aortic  valve velocity time integral equals 43 cm, consistent with  mild aortic stenosis.  3. Normal left ventricular internal dimensions and wall  thicknesses.  4. Normal left ventricular systolic function. No segmental  wall motion abnormalities.  5. Increased E/e'  is consistent with elevated left  ventricular filling pressure.  6. The right ventricle is not well visualized; grossly  normal right ventricular systolic function.  *** Compared with echocardiogram of 5/24/2018, no  significant changes noted.    < end of copied text >

## 2018-12-12 VITALS
HEART RATE: 75 BPM | OXYGEN SATURATION: 97 % | SYSTOLIC BLOOD PRESSURE: 130 MMHG | DIASTOLIC BLOOD PRESSURE: 76 MMHG | TEMPERATURE: 98 F | RESPIRATION RATE: 18 BRPM

## 2018-12-12 LAB
GLUCOSE BLDC GLUCOMTR-MCNC: 179 MG/DL — HIGH (ref 70–99)
GLUCOSE BLDC GLUCOMTR-MCNC: 200 MG/DL — HIGH (ref 70–99)

## 2018-12-12 PROCEDURE — 84132 ASSAY OF SERUM POTASSIUM: CPT

## 2018-12-12 PROCEDURE — 83036 HEMOGLOBIN GLYCOSYLATED A1C: CPT

## 2018-12-12 PROCEDURE — 85730 THROMBOPLASTIN TIME PARTIAL: CPT

## 2018-12-12 PROCEDURE — 82803 BLOOD GASES ANY COMBINATION: CPT

## 2018-12-12 PROCEDURE — 93306 TTE W/DOPPLER COMPLETE: CPT

## 2018-12-12 PROCEDURE — 82435 ASSAY OF BLOOD CHLORIDE: CPT

## 2018-12-12 PROCEDURE — 84560 ASSAY OF URINE/URIC ACID: CPT

## 2018-12-12 PROCEDURE — 85027 COMPLETE CBC AUTOMATED: CPT

## 2018-12-12 PROCEDURE — 73562 X-RAY EXAM OF KNEE 3: CPT | Mod: 26,RT

## 2018-12-12 PROCEDURE — 96374 THER/PROPH/DIAG INJ IV PUSH: CPT

## 2018-12-12 PROCEDURE — 83880 ASSAY OF NATRIURETIC PEPTIDE: CPT

## 2018-12-12 PROCEDURE — 82962 GLUCOSE BLOOD TEST: CPT

## 2018-12-12 PROCEDURE — 84550 ASSAY OF BLOOD/URIC ACID: CPT

## 2018-12-12 PROCEDURE — 82947 ASSAY GLUCOSE BLOOD QUANT: CPT

## 2018-12-12 PROCEDURE — 80053 COMPREHEN METABOLIC PANEL: CPT

## 2018-12-12 PROCEDURE — 73562 X-RAY EXAM OF KNEE 3: CPT

## 2018-12-12 PROCEDURE — 80048 BASIC METABOLIC PNL TOTAL CA: CPT

## 2018-12-12 PROCEDURE — 71045 X-RAY EXAM CHEST 1 VIEW: CPT

## 2018-12-12 PROCEDURE — 84295 ASSAY OF SERUM SODIUM: CPT

## 2018-12-12 PROCEDURE — 83605 ASSAY OF LACTIC ACID: CPT

## 2018-12-12 PROCEDURE — 82330 ASSAY OF CALCIUM: CPT

## 2018-12-12 PROCEDURE — 85014 HEMATOCRIT: CPT

## 2018-12-12 PROCEDURE — 93005 ELECTROCARDIOGRAM TRACING: CPT

## 2018-12-12 PROCEDURE — 85610 PROTHROMBIN TIME: CPT

## 2018-12-12 PROCEDURE — 97162 PT EVAL MOD COMPLEX 30 MIN: CPT

## 2018-12-12 PROCEDURE — 99285 EMERGENCY DEPT VISIT HI MDM: CPT | Mod: 25

## 2018-12-12 PROCEDURE — 97110 THERAPEUTIC EXERCISES: CPT

## 2018-12-12 PROCEDURE — 97116 GAIT TRAINING THERAPY: CPT

## 2018-12-12 PROCEDURE — 84100 ASSAY OF PHOSPHORUS: CPT

## 2018-12-12 PROCEDURE — 83735 ASSAY OF MAGNESIUM: CPT

## 2018-12-12 PROCEDURE — 96375 TX/PRO/DX INJ NEW DRUG ADDON: CPT

## 2018-12-12 PROCEDURE — 99239 HOSP IP/OBS DSCHRG MGMT >30: CPT

## 2018-12-12 RX ORDER — OXYCODONE HYDROCHLORIDE 5 MG/1
5 TABLET ORAL EVERY 6 HOURS
Qty: 0 | Refills: 0 | Status: DISCONTINUED | OUTPATIENT
Start: 2018-12-12 | End: 2018-12-12

## 2018-12-12 RX ORDER — PREGABALIN 225 MG/1
0 CAPSULE ORAL
Qty: 0 | Refills: 0 | COMMUNITY

## 2018-12-12 RX ORDER — OXYCODONE HYDROCHLORIDE 5 MG/1
1 TABLET ORAL
Qty: 0 | Refills: 0 | DISCHARGE
Start: 2018-12-12

## 2018-12-12 RX ORDER — OXYCODONE HYDROCHLORIDE 5 MG/1
10 TABLET ORAL EVERY 12 HOURS
Qty: 0 | Refills: 0 | Status: DISCONTINUED | OUTPATIENT
Start: 2018-12-12 | End: 2018-12-12

## 2018-12-12 RX ORDER — METOPROLOL TARTRATE 50 MG
1 TABLET ORAL
Qty: 0 | Refills: 0 | DISCHARGE
Start: 2018-12-12

## 2018-12-12 RX ORDER — ACETAMINOPHEN 500 MG
3 TABLET ORAL
Qty: 0 | Refills: 0 | DISCHARGE
Start: 2018-12-12

## 2018-12-12 RX ORDER — OXYCODONE HYDROCHLORIDE 5 MG/1
10 TABLET ORAL ONCE
Qty: 0 | Refills: 0 | Status: DISCONTINUED | OUTPATIENT
Start: 2018-12-12 | End: 2018-12-12

## 2018-12-12 RX ADMIN — OXYCODONE HYDROCHLORIDE 10 MILLIGRAM(S): 5 TABLET ORAL at 13:11

## 2018-12-12 RX ADMIN — Medication 3 UNIT(S): at 13:38

## 2018-12-12 RX ADMIN — OXYCODONE HYDROCHLORIDE 10 MILLIGRAM(S): 5 TABLET ORAL at 07:55

## 2018-12-12 RX ADMIN — HEPARIN SODIUM 5000 UNIT(S): 5000 INJECTION INTRAVENOUS; SUBCUTANEOUS at 06:14

## 2018-12-12 RX ADMIN — Medication 100 MILLIGRAM(S): at 06:14

## 2018-12-12 RX ADMIN — Medication 1: at 13:38

## 2018-12-12 RX ADMIN — Medication 975 MILLIGRAM(S): at 13:11

## 2018-12-12 RX ADMIN — HEPARIN SODIUM 5000 UNIT(S): 5000 INJECTION INTRAVENOUS; SUBCUTANEOUS at 13:10

## 2018-12-12 RX ADMIN — Medication 1000 UNIT(S): at 13:11

## 2018-12-12 RX ADMIN — Medication 975 MILLIGRAM(S): at 06:13

## 2018-12-12 RX ADMIN — Medication 100 MILLIGRAM(S): at 13:11

## 2018-12-12 RX ADMIN — ISOSORBIDE MONONITRATE 60 MILLIGRAM(S): 60 TABLET, EXTENDED RELEASE ORAL at 13:11

## 2018-12-12 RX ADMIN — OXYCODONE HYDROCHLORIDE 10 MILLIGRAM(S): 5 TABLET ORAL at 09:29

## 2018-12-12 RX ADMIN — GABAPENTIN 100 MILLIGRAM(S): 400 CAPSULE ORAL at 06:14

## 2018-12-12 RX ADMIN — Medication 1: at 09:33

## 2018-12-12 RX ADMIN — Medication 25 MILLIGRAM(S): at 06:13

## 2018-12-12 RX ADMIN — Medication 3 UNIT(S): at 09:33

## 2018-12-12 NOTE — PROGRESS NOTE ADULT - ASSESSMENT
78 year old woman history of HFpEF(last EF 66% with stage II diastolic dysfunction) on Lasix, HTN, T2DM, Breast Cancer s/p resection with known RUE lymphedema presents to the ED worsening b/l LE edema and pain admitted with CHF exacerbation.
78 year old woman history of HFpEF(last EF 66% with stage II diastolic dysfunction) on Lasix, HTN, T2DM, Breast Cancer s/p resection with known RUE lymphedema presents to the ED worsening b/l LE edema and pain admitted with CHF exacerbation. Patient is medically optimized for discharge to [facility].
78 year old woman history of HFpEF(last EF 66% with stage II diastolic dysfunction) on Lasix, HTN, T2DM, Breast Cancer s/p resection with known RUE lymphedema presents to the ED worsening b/l LE edema and pain admitted with CHF exacerbation. Patient is medically optimized for discharge to [facility].
78 year old woman history of HFpEF(last EF 66% with stage II diastolic dysfunction) on Lasix, HTN, T2DM, Breast Cancer s/p resection with known RUE lymphedema presents to the ED worsening b/l LE edema and pain admitted with CHF exacerbation.

## 2018-12-12 NOTE — PROGRESS NOTE ADULT - PROBLEM SELECTOR PLAN 2
Patient p/w worsening LE edema and signs of orthopnea.  Likely mild CHF exacerbation. -Now improving.   - s/p Lasix 60 mg. Fluid restriction. Strict I/Os. Daily weights.   - Echo- annular mitral calcification, normal LT ventricular systolic function, no wall motion abnormalities. EF- 60%  - Low salt diet with fluid restriction.   - Switched home Atenolol to Metoprolol succinate 25 mg daily  - outpatient d/c with Lasix 60 mg oral  - Will f/u outpatient with Dr. Flynn (cardiologist) Patient p/w worsening LE edema and signs of orthopnea.  Likely mild CHF exacerbation. -Now resolved.   - s/p Lasix 60 mg. Fluid restriction. Strict I/Os. Daily weights.   - Echo- annular mitral calcification, normal LT ventricular systolic function, no wall motion abnormalities. EF- 60%  - Low salt diet with fluid restriction.   - Switched home Atenolol to Metoprolol succinate 25 mg daily  - outpatient d/c with Lasix 60 mg oral daily  - Will f/u outpatient with Dr. Flynn (cardiologist)

## 2018-12-12 NOTE — PROGRESS NOTE ADULT - ATTENDING COMMENTS
----- Message from Kaye Wilkerson sent at 4/25/2018 12:09 PM CDT -----  Contact: Mom [phone]  Mom wants to know why the pt MRI was cancelled for tomorrow. Please call mom back to discuss.   -Patient seen/examined on 12/12/18. Case/plan discussed with Dr. Vazquez as reviewed/edited by me above.  -Discussed with patient and her daughter over the phone.   -Patient's right knee pain related to chronic OA changes seen on X-ray of right knee. Has history of right knee replacement ~1 year ago according to her daughter. Advised patient and her daughter to follow up with her orthopedist and pain management doctors for further management of chronic knee, hip, and back pain. However, for now will add oxycodone ER 10mg BID standing with oxycodone IR 5mg Q6H PRN. C/w Tylenol. Should avoid NSAIDS given CKD.   -Gout seems unlikely since no response to colchicine.   -Patient stable for DC to rehab today. -40 minutes spent on the discharge process.

## 2018-12-12 NOTE — PROGRESS NOTE ADULT - PROBLEM SELECTOR PLAN 1
Patient reporting right sided hip/back pain. Likely related to OA. Negative Straight Leg raise.   - Also, reports RT knee pain.  - Pain control: Acetaminophen 975 mg Q8H standing, Oxycodone 10mg q6h PRN for severe pain, Gabapentin 100mg BID.   -Sennaace while on opiates.   -Encourage oob  -Outpatient Orthopedics evaluation.  - Per daughter, pt had MRI of her spine done at [facility] that was significant for disc herniation and pinched nerve. She has radicular pain when ambulating. Pt has been following with [facility] management [facility] and was started on oxycodone 7.5 mg BID. She has a f/u appt scheduled for next week to discuss steroid injections.   - Uric acid level- 7.4. Will try 0.3 mg colchicine x 1. Patient reporting right sided hip/back pain. Likely related to OA. Negative Straight Leg raise.   - Also, reports RT knee pain.  - Pain control: Acetaminophen 975 mg Q8H standing, Oxycodone 10 mg ER BID, oxy 5 mg IR Q6H PRN, Gabapentin 100mg BID.   -Senna, Colace while on opiates.   -Encourage oob  -Outpatient Orthopedics evaluation.  - Per daughter, pt had MRI of her spine done at [facility] that was significant for disc herniation and pinched nerve. She has radicular pain when ambulating. Pt has been following with Spine management center and was started on oxycodone 7.5 mg BID. She has a f/u appt scheduled for next week to discuss steroid injections.   - Uric acid level- 7.4. Minimal improvement with colchicine  - Dispo pending X-Ray RT knee to r/o OA Patient reporting right sided hip/back pain. Likely related to OA. Negative Straight Leg raise.   - Also, reports RT knee pain.  - Pain control: Acetaminophen 975 mg Q8H standing, Oxycodone 10 mg ER BID, oxy 5 mg IR Q6H PRN, Gabapentin 100mg BID.   -Senna, Colace while on opiates.   -Encourage oob  -Outpatient Orthopedics evaluation.  - Per daughter, pt had MRI of her spine done at [facility] that was significant for disc herniation and pinched nerve. She has radicular pain when ambulating. Pt has been following with Spine management center and was started on oxycodone 7.5 mg BID. She has a f/u appt scheduled for next week to discuss steroid injections.   - Uric acid level- 7.4. Minimal improvement with colchicine, so unlikely gout.   - Dispo pending X-Ray RT knee to r/o OA

## 2018-12-12 NOTE — PROGRESS NOTE ADULT - PROBLEM SELECTOR PLAN 3
-Mostly acceptable for her age.   Continue home Hydralazine 100mg BID, Isosorbide Mononitrate ER 60mg QD.

## 2018-12-12 NOTE — PROGRESS NOTE ADULT - SUBJECTIVE AND OBJECTIVE BOX
Dr. Paras Vazquez  Internal Medicine PGY-1   Pager# [phone]    Patient is a 78y old  Female who presents with a chief complaint of lower extremity edema (11 Dec 2018 08:00)      SUBJECTIVE / OVERNIGHT EVENTS: No acute events overnight. Pt was HD stable overnight.     MEDICATIONS  (STANDING):  acetaminophen   Tablet .. 975 milliGRAM(s) Oral every 8 hours  atorvastatin 20 milliGRAM(s) Oral at bedtime  cholecalciferol 1000 Unit(s) Oral daily  dextrose 5%. 1000 milliLiter(s) (50 mL/Hr) IV Continuous <Continuous>  dextrose 50% Injectable 12.5 Gram(s) IV Push once  dextrose 50% Injectable 25 Gram(s) IV Push once  dextrose 50% Injectable 25 Gram(s) IV Push once  docusate sodium 100 milliGRAM(s) Oral three times a day  gabapentin 100 milliGRAM(s) Oral two times a day  heparin  Injectable 5000 Unit(s) SubCutaneous every 8 hours  hydrALAZINE 100 milliGRAM(s) Oral every 12 hours  insulin glargine Injectable (LANTUS) 9 Unit(s) SubCutaneous at bedtime  insulin lispro (HumaLOG) corrective regimen sliding scale   SubCutaneous three times a day before meals  insulin lispro (HumaLOG) corrective regimen sliding scale   SubCutaneous at bedtime  insulin lispro Injectable (HumaLOG) 3 Unit(s) SubCutaneous three times a day before meals  isosorbide   mononitrate ER Tablet (IMDUR) 60 milliGRAM(s) Oral daily  metoprolol succinate ER 25 milliGRAM(s) Oral daily  senna 2 Tablet(s) Oral at bedtime    MEDICATIONS  (PRN):  dextrose 40% Gel 15 Gram(s) Oral once PRN Blood Glucose LESS THAN 70 milliGRAM(s)/deciliter  glucagon  Injectable 1 milliGRAM(s) IntraMuscular once PRN Glucose LESS THAN 70 milligrams/deciliter  oxyCODONE    IR 10 milliGRAM(s) Oral every 6 hours PRN Severe Pain (7 - 10)      Vital Signs Last 24 Hrs  T(C): 37 (12 Dec 2018 04:19), Max: 37 (12 Dec 2018 04:19)  T(F): 98.6 (12 Dec 2018 04:19), Max: 98.6 (12 Dec 2018 04:19)  HR: 73 (12 Dec 2018 04:19) (63 - 73)  BP: 148/75 (12 Dec 2018 04:19) (128/78 - 152/78)  BP(mean): --  RR: 18 (12 Dec 2018 04:19) (18 - 18)  SpO2: 95% (12 Dec 2018 04:19) (95% - 98%)  CAPILLARY BLOOD GLUCOSE      POCT Blood Glucose.: 181 mg/dL (11 Dec 2018 22:13)  POCT Blood Glucose.: 206 mg/dL (11 Dec 2018 17:56)  POCT Blood Glucose.: 193 mg/dL (11 Dec 2018 13:16)  POCT Blood Glucose.: 219 mg/dL (11 Dec 2018 12:03)  POCT Blood Glucose.: 118 mg/dL (11 Dec 2018 08:31)    I&O's Summary    11 Dec 2018 07:01  -  12 Dec 2018 07:00  --------------------------------------------------------  IN: 1130 mL / OUT: 1700 mL / NET: -570 mL        PHYSICAL EXAM:  GENERAL: NAD, well-developed  HEAD:  Atraumatic, Normocephalic  EYES: EOMI, PERRLA, conjunctiva and sclera clear  NECK: Supple, No JVD  CHEST/LUNG: Clear to auscultation bilaterally; No wheeze  HEART: Regular rate and rhythm; No murmurs, rubs, or gallops  ABDOMEN: Soft, Nontender, Nondistended; Bowel sounds present  EXTREMITIES:  2+ Peripheral Pulses, No clubbing, cyanosis, or edema  PSYCH: AAOx3  NEUROLOGY: non-focal  SKIN: No rashes or lesions    LABS:    12-11    140  |  97  |  33<H>  ----------------------------<  114<H>  4.1   |  29  |  1.84<H>    Ca    9.5      11 Dec 2018 07:27                RADIOLOGY & ADDITIONAL TESTS:    Imaging Personally Reviewed:    Consultant(s) Notes Reviewed:      Care Discussed with Consultants/Other Providers: Dr. Paras Vazquez  Internal Medicine PGY-1   Pager# [phone]    Patient is a 78y old  Female who presents with a chief complaint of lower extremity edema (11 Dec 2018 08:00)      SUBJECTIVE / OVERNIGHT EVENTS: No acute events overnight. Pt was HD stable overnight. Reports RT knee pain.     MEDICATIONS  (STANDING):  acetaminophen   Tablet .. 975 milliGRAM(s) Oral every 8 hours  atorvastatin 20 milliGRAM(s) Oral at bedtime  cholecalciferol 1000 Unit(s) Oral daily  dextrose 5%. 1000 milliLiter(s) (50 mL/Hr) IV Continuous <Continuous>  dextrose 50% Injectable 12.5 Gram(s) IV Push once  dextrose 50% Injectable 25 Gram(s) IV Push once  dextrose 50% Injectable 25 Gram(s) IV Push once  docusate sodium 100 milliGRAM(s) Oral three times a day  gabapentin 100 milliGRAM(s) Oral two times a day  heparin  Injectable 5000 Unit(s) SubCutaneous every 8 hours  hydrALAZINE 100 milliGRAM(s) Oral every 12 hours  insulin glargine Injectable (LANTUS) 9 Unit(s) SubCutaneous at bedtime  insulin lispro (HumaLOG) corrective regimen sliding scale   SubCutaneous three times a day before meals  insulin lispro (HumaLOG) corrective regimen sliding scale   SubCutaneous at bedtime  insulin lispro Injectable (HumaLOG) 3 Unit(s) SubCutaneous three times a day before meals  isosorbide   mononitrate ER Tablet (IMDUR) 60 milliGRAM(s) Oral daily  metoprolol succinate ER 25 milliGRAM(s) Oral daily  senna 2 Tablet(s) Oral at bedtime    MEDICATIONS  (PRN):  dextrose 40% Gel 15 Gram(s) Oral once PRN Blood Glucose LESS THAN 70 milliGRAM(s)/deciliter  glucagon  Injectable 1 milliGRAM(s) IntraMuscular once PRN Glucose LESS THAN 70 milligrams/deciliter  oxyCODONE    IR 10 milliGRAM(s) Oral every 6 hours PRN Severe Pain (7 - 10)      Vital Signs Last 24 Hrs  T(C): 37 (12 Dec 2018 04:19), Max: 37 (12 Dec 2018 04:19)  T(F): 98.6 (12 Dec 2018 04:19), Max: 98.6 (12 Dec 2018 04:19)  HR: 73 (12 Dec 2018 04:19) (63 - 73)  BP: 148/75 (12 Dec 2018 04:19) (128/78 - 152/78)  BP(mean): --  RR: 18 (12 Dec 2018 04:19) (18 - 18)  SpO2: 95% (12 Dec 2018 04:19) (95% - 98%)  CAPILLARY BLOOD GLUCOSE      POCT Blood Glucose.: 181 mg/dL (11 Dec 2018 22:13)  POCT Blood Glucose.: 206 mg/dL (11 Dec 2018 17:56)  POCT Blood Glucose.: 193 mg/dL (11 Dec 2018 13:16)  POCT Blood Glucose.: 219 mg/dL (11 Dec 2018 12:03)  POCT Blood Glucose.: 118 mg/dL (11 Dec 2018 08:31)    I&O's Summary    11 Dec 2018 07:01  -  12 Dec 2018 07:00  --------------------------------------------------------  IN: 1130 mL / OUT: 1700 mL / NET: -570 mL        PHYSICAL EXAM:  GENERAL: NAD, well-developed  CHEST/LUNG: Clear to auscultation bilaterally; No wheeze  HEART: Regular rate and rhythm; No murmurs, rubs, or gallops  ABDOMEN: Soft, Nontender, Nondistended; Bowel sounds present  EXTREMITIES:  2+ Peripheral Pulses. RT knee swollen and tender to touch and b/l LE tender to touch  PSYCH: AAOx3  NEUROLOGY: non-focal    LABS:    12-11    140  |  97  |  33<H>  ----------------------------<  114<H>  4.1   |  29  |  1.84<H>    Ca    9.5      11 Dec 2018 07:27                RADIOLOGY & ADDITIONAL TESTS:    Imaging Personally Reviewed:    Consultant(s) Notes Reviewed:      Care Discussed with Consultants/Other Providers: Dr. Paras Vazquez  Internal Medicine PGY-1   Pager# [phone]    Patient is a 78y old  Female who presents with a chief complaint of lower extremity edema (11 Dec 2018 08:00)      SUBJECTIVE / OVERNIGHT EVENTS: No acute events overnight. Pt was HD stable overnight. Reports RT knee pain.     MEDICATIONS  (STANDING):  acetaminophen   Tablet .. 975 milliGRAM(s) Oral every 8 hours  atorvastatin 20 milliGRAM(s) Oral at bedtime  cholecalciferol 1000 Unit(s) Oral daily  dextrose 5%. 1000 milliLiter(s) (50 mL/Hr) IV Continuous <Continuous>  dextrose 50% Injectable 12.5 Gram(s) IV Push once  dextrose 50% Injectable 25 Gram(s) IV Push once  dextrose 50% Injectable 25 Gram(s) IV Push once  docusate sodium 100 milliGRAM(s) Oral three times a day  gabapentin 100 milliGRAM(s) Oral two times a day  heparin  Injectable 5000 Unit(s) SubCutaneous every 8 hours  hydrALAZINE 100 milliGRAM(s) Oral every 12 hours  insulin glargine Injectable (LANTUS) 9 Unit(s) SubCutaneous at bedtime  insulin lispro (HumaLOG) corrective regimen sliding scale   SubCutaneous three times a day before meals  insulin lispro (HumaLOG) corrective regimen sliding scale   SubCutaneous at bedtime  insulin lispro Injectable (HumaLOG) 3 Unit(s) SubCutaneous three times a day before meals  isosorbide   mononitrate ER Tablet (IMDUR) 60 milliGRAM(s) Oral daily  metoprolol succinate ER 25 milliGRAM(s) Oral daily  senna 2 Tablet(s) Oral at bedtime    MEDICATIONS  (PRN):  dextrose 40% Gel 15 Gram(s) Oral once PRN Blood Glucose LESS THAN 70 milliGRAM(s)/deciliter  glucagon  Injectable 1 milliGRAM(s) IntraMuscular once PRN Glucose LESS THAN 70 milligrams/deciliter  oxyCODONE    IR 10 milliGRAM(s) Oral every 6 hours PRN Severe Pain (7 - 10)      Vital Signs Last 24 Hrs  T(C): 37 (12 Dec 2018 04:19), Max: 37 (12 Dec 2018 04:19)  T(F): 98.6 (12 Dec 2018 04:19), Max: 98.6 (12 Dec 2018 04:19)  HR: 73 (12 Dec 2018 04:19) (63 - 73)  BP: 148/75 (12 Dec 2018 04:19) (128/78 - 152/78)  BP(mean): --  RR: 18 (12 Dec 2018 04:19) (18 - 18)  SpO2: 95% (12 Dec 2018 04:19) (95% - 98%)  CAPILLARY BLOOD GLUCOSE      POCT Blood Glucose.: 181 mg/dL (11 Dec 2018 22:13)  POCT Blood Glucose.: 206 mg/dL (11 Dec 2018 17:56)  POCT Blood Glucose.: 193 mg/dL (11 Dec 2018 13:16)  POCT Blood Glucose.: 219 mg/dL (11 Dec 2018 12:03)  POCT Blood Glucose.: 118 mg/dL (11 Dec 2018 08:31)    I&O's Summary    11 Dec 2018 07:01  -  12 Dec 2018 07:00  --------------------------------------------------------  IN: 1130 mL / OUT: 1700 mL / NET: -570 mL        PHYSICAL EXAM:  GENERAL: NAD, well-developed  CHEST/LUNG: Clear to auscultation bilaterally; No wheeze  HEART: Regular rate and rhythm; No murmurs, rubs, or gallops  ABDOMEN: Soft, Nontender, Nondistended; Bowel sounds present  EXTREMITIES:  2+ Peripheral Pulses. RT knee swollen and tender to touch and b/l LE tender to touch  PSYCH: AAOx3  NEUROLOGY: non-focal    LABS:    12-11    140  |  97  |  33<H>  ----------------------------<  114<H>  4.1   |  29  |  1.84<H>    Ca    9.5      11 Dec 2018 07:27          < from: Xray Knee 3 Views, Right (12.12.18 @ 15:48) >  FINDINGS:    Status post right total knee arthroplasty. Hardware is intact. Minimal   lucency at the tip of the tibial component and adjacent to the cement is   likely postsurgical in etiology. Comparison with any prior baseline   studies is recommended when available. No fractures or dislocation. There   are moderate to large bony productive changes about the right lateral   tibial plateau. Generalized edema and tiny joint effusion. Scattered   vascular calcifications and scattered prepatellar and suprapatellar   dystrophic calcifications are noted.    < end of copied text >        RADIOLOGY & ADDITIONAL TESTS:    Imaging Personally Reviewed: Right knee X-ray report    Consultant(s) Notes Reviewed:      Care Discussed with Consultants/Other Providers:

## 2018-12-12 NOTE — PROGRESS NOTE ADULT - PROBLEM SELECTOR PLAN 5
-Creatinine increasing somewhat likely due to lasix/diuresis.   Avoid nephrotoxins. Strict I/Os.  -Monitor BMP. -Creatinine was increasing somewhat likely due to lasix/diuresis.   Avoid nephrotoxins. Strict I/Os.  -Monitor BMP.  -Needs outpatient monitoring/follow up.

## 2018-12-12 NOTE — PROGRESS NOTE ADULT - PROBLEM SELECTOR PLAN 4
- A1C 6.7. Holding Tradjenta and Glimepiride while in hospital. s/p NPH 9 units  - Lantus 9 units at bedtime, Lispro 3u TID with ISS and FS. -Will adjust PRN.   - Consistent carb low Na diet  -Nutrition eval.

## 2018-12-12 NOTE — PROGRESS NOTE ADULT - PROBLEM SELECTOR PROBLEM 4
Type II diabetes mellitus

## 2018-12-12 NOTE — PROGRESS NOTE ADULT - PROBLEM SELECTOR PROBLEM 5
Chronic kidney disease, stage III (moderate)

## 2019-01-08 ENCOUNTER — APPOINTMENT (OUTPATIENT)
Dept: CARDIOLOGY | Facility: CLINIC | Age: 79
End: 2019-01-08

## 2019-01-08 ENCOUNTER — APPOINTMENT (OUTPATIENT)
Dept: INTERNAL MEDICINE | Facility: CLINIC | Age: 79
End: 2019-01-08

## 2019-01-19 ENCOUNTER — EMERGENCY (EMERGENCY)
Facility: HOSPITAL | Age: 79
LOS: 1 days | Discharge: HOME | End: 2019-01-19
Attending: EMERGENCY MEDICINE
Payer: MEDICARE

## 2019-01-19 VITALS
OXYGEN SATURATION: 97 % | HEIGHT: 62 IN | HEART RATE: 72 BPM | DIASTOLIC BLOOD PRESSURE: 83 MMHG | SYSTOLIC BLOOD PRESSURE: 158 MMHG | TEMPERATURE: 99 F | RESPIRATION RATE: 18 BRPM | WEIGHT: 190.04 LBS

## 2019-01-19 VITALS
TEMPERATURE: 98 F | SYSTOLIC BLOOD PRESSURE: 174 MMHG | DIASTOLIC BLOOD PRESSURE: 78 MMHG | OXYGEN SATURATION: 95 % | RESPIRATION RATE: 20 BRPM | HEART RATE: 81 BPM

## 2019-01-19 DIAGNOSIS — Z90.11 ACQUIRED ABSENCE OF RIGHT BREAST AND NIPPLE: Chronic | ICD-10-CM

## 2019-01-19 DIAGNOSIS — Z98.89 OTHER SPECIFIED POSTPROCEDURAL STATES: Chronic | ICD-10-CM

## 2019-01-19 DIAGNOSIS — Z93.2 ILEOSTOMY STATUS: Chronic | ICD-10-CM

## 2019-01-19 LAB
ALBUMIN SERPL ELPH-MCNC: 3.8 G/DL — SIGNIFICANT CHANGE UP (ref 3.3–5)
ALP SERPL-CCNC: 65 U/L — SIGNIFICANT CHANGE UP (ref 40–120)
ALT FLD-CCNC: 14 U/L — SIGNIFICANT CHANGE UP (ref 10–45)
ANION GAP SERPL CALC-SCNC: 16 MMOL/L — SIGNIFICANT CHANGE UP (ref 5–17)
APPEARANCE UR: CLEAR — SIGNIFICANT CHANGE UP
APTT BLD: 29.9 SEC — SIGNIFICANT CHANGE UP (ref 27.5–36.3)
AST SERPL-CCNC: 25 U/L — SIGNIFICANT CHANGE UP (ref 10–40)
BACTERIA # UR AUTO: ABNORMAL
BASOPHILS # BLD AUTO: 0 K/UL — SIGNIFICANT CHANGE UP (ref 0–0.2)
BASOPHILS NFR BLD AUTO: 0.2 % — SIGNIFICANT CHANGE UP (ref 0–2)
BILIRUB SERPL-MCNC: 0.3 MG/DL — SIGNIFICANT CHANGE UP (ref 0.2–1.2)
BILIRUB UR-MCNC: NEGATIVE — SIGNIFICANT CHANGE UP
BUN SERPL-MCNC: 35 MG/DL — HIGH (ref 7–23)
CALCIUM SERPL-MCNC: 9.8 MG/DL — SIGNIFICANT CHANGE UP (ref 8.4–10.5)
CHLORIDE SERPL-SCNC: 97 MMOL/L — SIGNIFICANT CHANGE UP (ref 96–108)
CO2 SERPL-SCNC: 27 MMOL/L — SIGNIFICANT CHANGE UP (ref 22–31)
COLOR SPEC: COLORLESS — SIGNIFICANT CHANGE UP
CREAT SERPL-MCNC: 1.53 MG/DL — HIGH (ref 0.5–1.3)
DIFF PNL FLD: ABNORMAL
EOSINOPHIL # BLD AUTO: 0 K/UL — SIGNIFICANT CHANGE UP (ref 0–0.5)
EOSINOPHIL NFR BLD AUTO: 0.1 % — SIGNIFICANT CHANGE UP (ref 0–6)
EPI CELLS # UR: 2 — SIGNIFICANT CHANGE UP
GAS PNL BLDV: SIGNIFICANT CHANGE UP
GLUCOSE SERPL-MCNC: 124 MG/DL — HIGH (ref 70–99)
GLUCOSE UR QL: NEGATIVE — SIGNIFICANT CHANGE UP
HCT VFR BLD CALC: 30.5 % — LOW (ref 34.5–45)
HGB BLD-MCNC: 10.2 G/DL — LOW (ref 11.5–15.5)
HYALINE CASTS # UR AUTO: 0 /LPF — SIGNIFICANT CHANGE UP (ref 0–7)
INR BLD: 0.9 RATIO — SIGNIFICANT CHANGE UP (ref 0.88–1.16)
KETONES UR-MCNC: NEGATIVE — SIGNIFICANT CHANGE UP
LEUKOCYTE ESTERASE UR-ACNC: SIGNIFICANT CHANGE UP
LYMPHOCYTES # BLD AUTO: 1.4 K/UL — SIGNIFICANT CHANGE UP (ref 1–3.3)
LYMPHOCYTES # BLD AUTO: 19.2 % — SIGNIFICANT CHANGE UP (ref 13–44)
MCHC RBC-ENTMCNC: 30.9 PG — SIGNIFICANT CHANGE UP (ref 27–34)
MCHC RBC-ENTMCNC: 33.6 GM/DL — SIGNIFICANT CHANGE UP (ref 32–36)
MCV RBC AUTO: 91.9 FL — SIGNIFICANT CHANGE UP (ref 80–100)
MONOCYTES # BLD AUTO: 0.6 K/UL — SIGNIFICANT CHANGE UP (ref 0–0.9)
MONOCYTES NFR BLD AUTO: 9 % — SIGNIFICANT CHANGE UP (ref 2–14)
NEUTROPHILS # BLD AUTO: 5.1 K/UL — SIGNIFICANT CHANGE UP (ref 1.8–7.4)
NEUTROPHILS NFR BLD AUTO: 71.5 % — SIGNIFICANT CHANGE UP (ref 43–77)
NITRITE UR-MCNC: NEGATIVE — SIGNIFICANT CHANGE UP
NT-PROBNP SERPL-SCNC: 394 PG/ML — HIGH (ref 0–300)
PH UR: 7.5 — SIGNIFICANT CHANGE UP (ref 5–8)
PLATELET # BLD AUTO: 235 K/UL — SIGNIFICANT CHANGE UP (ref 150–400)
POTASSIUM SERPL-MCNC: 4.2 MMOL/L — SIGNIFICANT CHANGE UP (ref 3.5–5.3)
POTASSIUM SERPL-SCNC: 4.2 MMOL/L — SIGNIFICANT CHANGE UP (ref 3.5–5.3)
PROT SERPL-MCNC: 7.5 G/DL — SIGNIFICANT CHANGE UP (ref 6–8.3)
PROT UR-MCNC: NEGATIVE — SIGNIFICANT CHANGE UP
PROTHROM AB SERPL-ACNC: 10.2 SEC — SIGNIFICANT CHANGE UP (ref 10–12.9)
RBC # BLD: 3.32 M/UL — LOW (ref 3.8–5.2)
RBC # FLD: 13.8 % — SIGNIFICANT CHANGE UP (ref 10.3–14.5)
RBC CASTS # UR COMP ASSIST: 3 /HPF — SIGNIFICANT CHANGE UP (ref 0–4)
SODIUM SERPL-SCNC: 140 MMOL/L — SIGNIFICANT CHANGE UP (ref 135–145)
SP GR SPEC: 1.01 — LOW (ref 1.01–1.02)
TROPONIN T, HIGH SENSITIVITY RESULT: 25 NG/L — SIGNIFICANT CHANGE UP (ref 0–51)
UROBILINOGEN FLD QL: NEGATIVE — SIGNIFICANT CHANGE UP
WBC # BLD: 7.1 K/UL — SIGNIFICANT CHANGE UP (ref 3.8–10.5)
WBC # FLD AUTO: 7.1 K/UL — SIGNIFICANT CHANGE UP (ref 3.8–10.5)
WBC UR QL: 2 /HPF — SIGNIFICANT CHANGE UP (ref 0–5)

## 2019-01-19 PROCEDURE — 93010 ELECTROCARDIOGRAM REPORT: CPT

## 2019-01-19 PROCEDURE — 71045 X-RAY EXAM CHEST 1 VIEW: CPT

## 2019-01-19 PROCEDURE — 99285 EMERGENCY DEPT VISIT HI MDM: CPT | Mod: 25

## 2019-01-19 PROCEDURE — 83880 ASSAY OF NATRIURETIC PEPTIDE: CPT

## 2019-01-19 PROCEDURE — 71045 X-RAY EXAM CHEST 1 VIEW: CPT | Mod: 26

## 2019-01-19 PROCEDURE — 93005 ELECTROCARDIOGRAM TRACING: CPT

## 2019-01-19 PROCEDURE — 81001 URINALYSIS AUTO W/SCOPE: CPT

## 2019-01-19 PROCEDURE — 85027 COMPLETE CBC AUTOMATED: CPT

## 2019-01-19 PROCEDURE — 96374 THER/PROPH/DIAG INJ IV PUSH: CPT

## 2019-01-19 PROCEDURE — 80053 COMPREHEN METABOLIC PANEL: CPT

## 2019-01-19 PROCEDURE — 93970 EXTREMITY STUDY: CPT | Mod: 26

## 2019-01-19 PROCEDURE — 84484 ASSAY OF TROPONIN QUANT: CPT

## 2019-01-19 PROCEDURE — 84443 ASSAY THYROID STIM HORMONE: CPT

## 2019-01-19 PROCEDURE — 96375 TX/PRO/DX INJ NEW DRUG ADDON: CPT

## 2019-01-19 PROCEDURE — 85730 THROMBOPLASTIN TIME PARTIAL: CPT

## 2019-01-19 PROCEDURE — 93970 EXTREMITY STUDY: CPT

## 2019-01-19 PROCEDURE — 99284 EMERGENCY DEPT VISIT MOD MDM: CPT | Mod: 25

## 2019-01-19 PROCEDURE — 85610 PROTHROMBIN TIME: CPT

## 2019-01-19 RX ORDER — CEFAZOLIN SODIUM 1 G
1000 VIAL (EA) INJECTION ONCE
Qty: 0 | Refills: 0 | Status: COMPLETED | OUTPATIENT
Start: 2019-01-19 | End: 2019-01-19

## 2019-01-19 RX ORDER — CEPHALEXIN 500 MG
1 CAPSULE ORAL
Qty: 28 | Refills: 0
Start: 2019-01-19 | End: 2019-01-25

## 2019-01-19 RX ORDER — FUROSEMIDE 40 MG
60 TABLET ORAL ONCE
Qty: 0 | Refills: 0 | Status: COMPLETED | OUTPATIENT
Start: 2019-01-19 | End: 2019-01-19

## 2019-01-19 RX ADMIN — Medication 100 MILLIGRAM(S): at 18:38

## 2019-01-19 RX ADMIN — Medication 60 MILLIGRAM(S): at 15:06

## 2019-01-19 NOTE — ED ADULT NURSE REASSESSMENT NOTE - NS ED NURSE REASSESS COMMENT FT1
Patient d/c from ED by Lisbet SALES. Patient IV removed, discharge instructions provided by PA. Instructions discussed with daughter, verbalized understanding. Leaving unit by wheelchair, free from harm.

## 2019-01-19 NOTE — ED PROVIDER NOTE - MEDICAL DECISION MAKING DETAILS
79 y/o F pmhx CHF, breast CA in past, recent admission for CHF exacerbation and b/l LE edema, presenting today for same with SOB and b/l LE pitting edema, elevated weight in the last week. On lasix at home 60mg daily. complaining also urinary frequency recently and back pain (takes steroids for back pain). +rales, 2+ b/l LE pitting edema. Will obtain bloodwork, pro-bnp, trop, cxr, ekg, ua, IV lasix and possible readmission to hospital. GAGE. 79 y/o F pmhx CHF, breast CA in past, recent admission for CHF exacerbation and b/l LE edema, presenting today for same with SOB and b/l LE pitting edema, rt leg more swollen and red ,she had rt knee replacement  1 y and has redness and swelling of her leg  since surgery , elevated weight in the last week. On lasix at home 60mg daily. complaining also urinary frequency recently and back pain (takes steroids for back pain). +rales, 2+ b/l LE pitting edema. Will obtain blood work, pro-bnp, trop, cxr, ekg, ua, IV lasix DVT study of the rt leg . and possible readmission to hospital. GAGE.

## 2019-01-19 NOTE — ED PROVIDER NOTE - ATTENDING CONTRIBUTION TO CARE
77 y/o F pmhx CHF, breast CA in past, recent admission for CHF exacerbation and b/l LE edema, presenting today for same with SOB and b/l LE pitting edema, elevated weight in the last week. On lasix at home 60mg daily. complaining also urinary frequency recently and back pain (takes steroids for back pain). +rales, 2+ b/l LE pitting edema. Will obtain bloodwork, pro-bnp, trop, cxr, ekg, ua, IV lasix and possible readmission to hospital. GAGE.

## 2019-01-19 NOTE — ED ADULT NURSE REASSESSMENT NOTE - NS ED NURSE REASSESS COMMENT FT1
Report received from Angela FLORES. Patient awaiting dispo at this time. VSS, afebrile. Safety maintained.

## 2019-01-19 NOTE — ED ADULT NURSE REASSESSMENT NOTE - COMFORT CARE
darkened lights/treatment delay explained/wait time explained/plan of care explained/warm blanket provided/side rails up

## 2019-01-19 NOTE — ED PROVIDER NOTE - PROGRESS NOTE DETAILS
CHF markedly improved from last visit with cleared CXR and marked improvement of pro-bnp. will evaluate for DVT with US and check UA given increased frequency and if negative d/w daughter will go home with oral keflex for her RLE cellulitis and lidocaine patch for back pain. - Lisbet Pitts PA-C patient feeling improved. found to have a soleal clot on RLE DVT study, LLE negative. spoke with Dr. Dawkins's partner, Dr. Ramirez regarding plan. Agrees no necessity to anticoagulate soleal clot at this time. Will treat overlying RLE erythema with ancef and outpatient keflex for concern for developing cellulitis. Dr. Ramirez states patient can follow-up for her scheduled appointment with Dr. Dawkins for Tuesday. Pending UA results at this time. -Lisbet Pitts PA-C patient feeling improved, denies any shortness of breath. vitals without any signs of hypoxia, tachycardia or fever. Pt found to have a soleal clot on RLE DVT study, LLE negative. spoke with Dr. Dawkins's partner, Dr. Ramirez regarding plan. Agrees no necessity to anticoagulate soleal clot at this time. Will treat overlying RLE erythema with ancef and outpatient keflex for concern for developing cellulitis. Dr. Ramirez states patient can follow-up for her scheduled appointment with Dr. Dawkins for Tuesday. Pending UA results at this time. -Lisbet Pitts PA-C ua negative for infection. lengthy discussion with patient's daughter regarding results and no need for anti-coagulation at this time discussed with patient's PMD colleague. advised on need for antibiotics for RLE erythema and follow-up with scheduled appointments for Tuesday with pmd and cardiologist. daughter agrees and patient stable for d/c. -Lisbet Pitts PA-C ua negative for infection. lengthy discussion with patient's daughter regarding results and no need for anti-coagulation at this time discussed with patient's PMD colleague. advised on need for antibiotics for RLE erythema and follow-up with scheduled appointments for Tuesday with pmd and cardiologist. advised and instructed in discharge to have repeat DVT study in 1 week to reevaluate. daughter agrees and patient stable for d/c. -Lisbet Pitts PA-C pt signed out to me by dr jones at the usual time pending DVT US, re-evaluation. pt feels well at this time, no reported sob currently. us revealed soleal clot, which was discussed with pmd's partner, plan to hold a/c at this time, re-eval and have repeat us with pmd. pt with evidence of cellulitis, no elevated wbc, no fever, plan to manage with outpatient po abx. return precautions and importance of f/u discussed in detail with patient's daughter. - Rashi Pitts MD

## 2019-01-19 NOTE — ED PROVIDER NOTE - NS ED ROS FT
Constitutional: No fever or chills  Eyes: No visual changes, eye pain or redness  HEENT: No throat pain, ear pain, nasal pain. No nose bleeding.  CV: No chest pain +chronic swelling of R arm, b/l LE swelling  Resp: +SOB,  no cough  GI: No abd pain. No nausea or vomiting. No diarrhea. No constipation.   : No dysuria, hematuria.   MSK: +back pain  Skin: No rash  Neuro: No headache. No numbness or tingling. No weakness.

## 2019-01-19 NOTE — ED ADULT NURSE NOTE - OBJECTIVE STATEMENT
Patient  is  alert  and  oriented  x3  Color  is  good  and skin warm  to  touch.  Swelling  and  redness  noted  to  both  legs.  BHAT   noted.  She  is  c/o  pain  to  both  legs.

## 2019-01-19 NOTE — ED PROVIDER NOTE - PHYSICAL EXAMINATION
GEN: Well Appearing, Nontoxic, NAD  HEENT: NC/AT, Symm Facies. PERRL, EOMI, MMM, posterior pharynx clear  CV: No JVD/Bruits or stridor;  +S1S2, RRR w/o m/g/r  RESP: rales diffusely bilaterally  ABD: Soft, nt/nd, +BS. No guarding/rebound. No RUQ tender, no CVAT  EXT/MSK: +b/l LE pitting edema 2+. no calf tenderness. WWP, palpable pulses. FROMx4. significant chronic lymphedema of RUE   SKIN: No erythema, lesions or rash  Neuro: Grossly intact, AOX3 with normal speech, CN II-XII intact; Sensation intact, motor 5/5 throughout. Gait normal GEN: Well Appearing, Nontoxic, NAD  HEENT: NC/AT, Symm Facies. PERRL, EOMI, MMM, posterior pharynx clear  CV: No JVD/Bruits or stridor;  +S1S2, RRR w/o m/g/r  RESP: rales diffusely bilaterally  ABD: Soft, nt/nd, +BS. No guarding/rebound. No RUQ tender, no CVAT  EXT/MSK: +b/l LE pitting edema 2+. no calf tenderness. WWP, palpable pulses. FROMx4. significant chronic lymphedema of RUE with mild redness    SKIN: No erythema, lesions or rashand swelling   Neuro: Grossly intact, AOX3 with normal speech, CN II-XII intact; Sensation intact, motor 5/5 throughout. Gait normal

## 2019-01-19 NOTE — ED PROVIDER NOTE - OBJECTIVE STATEMENT
78 year old woman history of HFpEF(last EF 66% with stage II diastolic dysfunction) on Lasix, HTN, T2DM, Breast Cancer s/p resection with known RUE lymphedema recent admission for b/l LE edema and pain one month ago, presenting today for the same symptoms. States that she has increased weight in the last week from 184lb now to 190lb. She has h/o spinal stenosis and complaining of severe back pain, recent steroid injection in 12/2018 with minimal improvement.   PMD Dr. Dawkins  Cards: Nichol 78 year old woman history of HFpEF(last EF 66% with stage II diastolic dysfunction) on Lasix, HTN, T2DM, Breast Cancer s/p resection with known RUE lymphedema recent admission for b/l LE edema and pain one month ago, presenting today for the same symptoms. States that she has increased weight in the last week from 184lb now to 190lb. She has h/o spinal stenosis and complaining of severe back pain, recent steroid injection in 12/2018 with minimal improvement. daughter thinks what her rt leg is more swollen when left ,no fever or chills   PMD Dr. Dawkins  Cards: Nichol 78 year old woman history of HFpEF(last EF 66% with stage II diastolic dysfunction) on Lasix, HTN, T2DM, Breast Cancer s/p resection with known RUE lymphedema recent admission for b/l LE edema and pain one month ago, presenting today for the same symptoms. States that she has increased weight in the last week from 184lb now to 190lb. She has h/o spinal stenosis and complaining of severe back pain, recent steroid injection in 12/2018 with minimal improvement. daughter thinks what her rt leg is more swollen when left ,no fever or chills. +urinary frequency  PMD Dr. Dawkins  Cards: Nichol

## 2019-01-19 NOTE — ED PROVIDER NOTE - NSFOLLOWUPINSTRUCTIONS_ED_ALL_ED_FT
1. Follow-up with your scheduled appointments with your primary care physician and your cardiologist on Tuesday for reevaluation and continued treatment   2. A copy of all results including finding of Soleal calf vein clot on ultrasound in your discharge instructions. Please bring copy to your appointment for continued treatment   3. Take keflex 500mg tablet 4 times per day for 7 days   4. Take tylenol for any pain   5. Return to the ER for any new or worsening symptoms

## 2019-01-20 LAB — TSH SERPL-MCNC: 2.09 UIU/ML — SIGNIFICANT CHANGE UP (ref 0.27–4.2)

## 2019-01-22 ENCOUNTER — NON-APPOINTMENT (OUTPATIENT)
Age: 79
End: 2019-01-22

## 2019-01-22 ENCOUNTER — APPOINTMENT (OUTPATIENT)
Dept: CARDIOLOGY | Facility: CLINIC | Age: 79
End: 2019-01-22
Payer: MEDICARE

## 2019-01-22 ENCOUNTER — APPOINTMENT (OUTPATIENT)
Dept: INTERNAL MEDICINE | Facility: CLINIC | Age: 79
End: 2019-01-22
Payer: MEDICARE

## 2019-01-22 VITALS
RESPIRATION RATE: 14 BRPM | OXYGEN SATURATION: 98 % | DIASTOLIC BLOOD PRESSURE: 74 MMHG | HEIGHT: 61 IN | TEMPERATURE: 98.2 F | WEIGHT: 192 LBS | BODY MASS INDEX: 36.25 KG/M2 | HEART RATE: 82 BPM | SYSTOLIC BLOOD PRESSURE: 152 MMHG

## 2019-01-22 VITALS — SYSTOLIC BLOOD PRESSURE: 132 MMHG | DIASTOLIC BLOOD PRESSURE: 68 MMHG

## 2019-01-22 PROCEDURE — 99215 OFFICE O/P EST HI 40 MIN: CPT

## 2019-01-22 PROCEDURE — 99214 OFFICE O/P EST MOD 30 MIN: CPT | Mod: 25

## 2019-01-22 PROCEDURE — 93000 ELECTROCARDIOGRAM COMPLETE: CPT

## 2019-01-22 NOTE — REVIEW OF SYSTEMS
[Dyspnea on exertion] : dyspnea during exertion [Lower Ext Edema] : lower extremity edema [Joint Pain] : joint pain [Negative] : Heme/Lymph

## 2019-01-22 NOTE — REVIEW OF SYSTEMS
[Lower Ext Edema] : lower extremity edema [Joint Pain] : joint pain [Joint Stiffness] : joint stiffness [Unsteady Walking] : ataxia [Insomnia] : insomnia [Anxiety] : anxiety [Depression] : depression [Negative] : Heme/Lymph [Joint Swelling] : no joint swelling [Headache] : no headache [Confusion] : no confusion [Memory Loss] : no memory loss [Suicidal] : not suicidal

## 2019-01-22 NOTE — PHYSICAL EXAM

## 2019-01-25 ENCOUNTER — APPOINTMENT (OUTPATIENT)
Dept: CARDIOLOGY | Facility: CLINIC | Age: 79
End: 2019-01-25
Payer: MEDICARE

## 2019-01-25 ENCOUNTER — RX RENEWAL (OUTPATIENT)
Age: 79
End: 2019-01-25

## 2019-01-25 ENCOUNTER — MEDICATION RENEWAL (OUTPATIENT)
Age: 79
End: 2019-01-25

## 2019-01-25 VITALS
WEIGHT: 193 LBS | BODY MASS INDEX: 36.44 KG/M2 | RESPIRATION RATE: 12 BRPM | OXYGEN SATURATION: 98 % | DIASTOLIC BLOOD PRESSURE: 70 MMHG | SYSTOLIC BLOOD PRESSURE: 159 MMHG | HEIGHT: 61 IN | TEMPERATURE: 98.4 F | HEART RATE: 74 BPM

## 2019-01-25 PROCEDURE — 99214 OFFICE O/P EST MOD 30 MIN: CPT

## 2019-01-25 NOTE — ASSESSMENT
[FreeTextEntry1] : Assessment:\par 1.  Right below the knee soleal vein DVT\par 2.  Recent hospitalization\par 3.  history of breast cancer\par 4.  History of Rectal CA\par 5.  Right arm lymphedema. \par 6.  Mild pulmonary HTN\par \par Plan\par 1.  Followup with breast cancer doc for malignancy screening\par 2.  Follow up with Dr. Mccray for malignancy screening\par 3.  Repeat venous duplex in 1-2 weeks to assess for propagation. \par 4.  Start aspirin 81mg daily\par 5.  Start compression stockings of the legs. \par 6.  Await testing. \par

## 2019-01-25 NOTE — REASON FOR VISIT
[Consultation] : a consultation regarding [FreeTextEntry1] : PCP:  Juancho\par Cardiology:  Vavasis\par \par 78 year old female here for vascular medicine consultation of RLE edema and below the knee  DVT.  .  History of HTN, HLD, DM, breast CA, rectal CA, renal dz.  \par \par Was brought into [facility] emergency room 1 week ago by her daughter.  She had R leg pains and was found. \par duplex performed showed a right soleal vein.  she was given antibiotics for RLE cellulitis.  She had an injection to the low back 5 days ago.  No prior history of VTE.  The leg feels better after antibiotcs were started.  She is not wearing a compression stocking.  Anticoagluation was not started.  \par \par she is not on aspirin.\par History of R upper extremity lymphedema not wearing compression.  R arm does not bother her.  \par \par Was previously on Lipitor, stopped during recent hospitlaization for heart failure exacerbatrion in December 2018.  \par Colonoscopy:  3 years ago.  \par Pap smear - never\par Mammogram - very long time ago.\par \par Has not had followup for her history of breast cancer. \par \par Right: \par \par There is normal compressibility of the right common femoral, femoral and \par popliteal veins. \par \par Acute thrombus in one of the paired right soleal veins. The right posterior \par tibial and peroneal veins are color Doppler patent and compressible. \par \par Left: \par There is normal compressibility of the left common femoral, femoral and \par popliteal veins. No left calf vein thrombosis. \par \par Doppler examination shows normal spontaneous and phasic flow in the \par unaffected veins. \par \par Left calf edema. Incidental Baker's cyst in the left popliteal fossa. \par \par IMPRESSION: \par \par 1. Acute deep venous thrombosis of a right soleal vein (below the knee). \par 2. These findings were discussed with MÓNICA PONCE at 1/19/2019 4:50 \par PM by vascular ultrasound technologist Gladys Garland of \par Radiology with read back confirmation. \par \par

## 2019-01-25 NOTE — PHYSICAL EXAM
[General Appearance - Well Developed] : well developed [Normal Appearance] : normal appearance [Well Groomed] : well groomed [General Appearance - Well Nourished] : well nourished [No Deformities] : no deformities [General Appearance - In No Acute Distress] : no acute distress [Normal Conjunctiva] : the conjunctiva exhibited no abnormalities [Eyelids - No Xanthelasma] : the eyelids demonstrated no xanthelasmas [Normal Jugular Venous A Waves Present] : normal jugular venous A waves present [Normal Jugular Venous V Waves Present] : normal jugular venous V waves present [No Jugular Venous Amaya A Waves] : no jugular venous amaya A waves [Heart Rate And Rhythm] : heart rate and rhythm were normal [Heart Sounds] : normal S1 and S2 [Murmurs] : no murmurs present [Respiration, Rhythm And Depth] : normal respiratory rhythm and effort [Exaggerated Use Of Accessory Muscles For Inspiration] : no accessory muscle use [Auscultation Breath Sounds / Voice Sounds] : lungs were clear to auscultation bilaterally [Abdomen Soft] : soft [Abdomen Tenderness] : non-tender [Abdomen Mass (___ Cm)] : no abdominal mass palpated [Abnormal Walk] : normal gait [Gait - Sufficient For Exercise Testing] : the gait was sufficient for exercise testing [Skin Color & Pigmentation] : normal skin color and pigmentation [] : no rash [No Venous Stasis] : no venous stasis [Skin Lesions] : no skin lesions [No Skin Ulcers] : no skin ulcer [No Xanthoma] : no  xanthoma was observed [Oriented To Time, Place, And Person] : oriented to person, place, and time [Affect] : the affect was normal [Mood] : the mood was normal [No Anxiety] : not feeling anxious [FreeTextEntry1] : LE edema R>L.  RUE lymphedema.    right knee replacement.  Strong pedal pulses.

## 2019-01-28 ENCOUNTER — NON-APPOINTMENT (OUTPATIENT)
Age: 79
End: 2019-01-28

## 2019-01-28 ENCOUNTER — MEDICATION RENEWAL (OUTPATIENT)
Age: 79
End: 2019-01-28

## 2019-01-28 NOTE — PHYSICAL EXAM
[General Appearance - Well Developed] : well developed [Normal Appearance] : normal appearance [General Appearance - Well Nourished] : well nourished [No Deformities] : no deformities [Normal Conjunctiva] : the conjunctiva exhibited no abnormalities [Normal Oral Mucosa] : normal oral mucosa [No Oral Pallor] : no oral pallor [No Oral Cyanosis] : no oral cyanosis [Normal Jugular Venous A Waves Present] : normal jugular venous A waves present [Normal Jugular Venous V Waves Present] : normal jugular venous V waves present [Respiration, Rhythm And Depth] : normal respiratory rhythm and effort [Exaggerated Use Of Accessory Muscles For Inspiration] : no accessory muscle use [Auscultation Breath Sounds / Voice Sounds] : lungs were clear to auscultation bilaterally [Normal Rate] : normal [Rhythm Regular] : regular [Normal S1] : normal S1 [Normal S2] : normal S2 [II] : a grade 2 [___ +] : bilateral [unfilled]U+ pretibial pitting edema [Abdomen Soft] : soft [Abdomen Tenderness] : non-tender [Nail Clubbing] : no clubbing of the fingernails [Cyanosis, Localized] : no localized cyanosis [Petechial Hemorrhages (___cm)] : no petechial hemorrhages [Skin Color & Pigmentation] : normal skin color and pigmentation [] : no rash [Skin Lesions] : no skin lesions [Oriented To Time, Place, And Person] : oriented to person, place, and time [Affect] : the affect was normal [Mood] : the mood was normal [No Anxiety] : not feeling anxious [Well Groomed] : well groomed [General Appearance - In No Acute Distress] : no acute distress [Bowel Sounds] : normal bowel sounds [No Skin Ulcers] : no skin ulcer [Right Carotid Bruit] : no bruit heard over the right carotid [Left Carotid Bruit] : no bruit heard over the left carotid [Bruit] : no bruit heard [FreeTextEntry1] : Limited mobility

## 2019-01-28 NOTE — HISTORY OF PRESENT ILLNESS
[FreeTextEntry1] : Patient is an 78 year old woman with a history of HTN, hyperlipidemia, type 2 diabetes mellitus, breast cancer status post right partial mastectomy, rectal carcinoma status post resection, anemia, hypokalemia, diastolic heart failure, and renal insufficiency who presents today for follow up of HTN and heart failure. She was hospitalized in December for 4 days for acute heart failure exacerbation and worsening lower extremity edema; at that time her Atenolol was switched to Metoprolol and her Lasix was increased to 60mg daily. This past weekend she went back to the emergency room for worsening lower extremity edema and was found to have a superficial right DVT below the knee and started on antibiotics for lower extremity cellulitis. She was not started on any anticoagulation at that time. Currently she reports feeling better, she otherwise denies any chest pain, palpitations, dyspnea at rest, headache or palpitations. Her right upper extremity swelling due to chronic lymphedema and dyspnea with exertion are unchanged.

## 2019-01-28 NOTE — DISCUSSION/SUMMARY
[FreeTextEntry1] : IMPRESSION: Ms. Batista is an 78 year old woman with a history of HTN, hyperlipidemia, type 2 diabetes mellitus, breast cancer status post right partial mastectomy, rectal carcinoma status post resection, diastolic heart failure, anemia, hypokalemia, and renal insufficiency who presents today for follow up of HTN and heart failure. \par \par PLAN:\par 1. Given her lower extremity edema we will increase her Lasix to 80mg daily. She will follow up with vascular Cardiology on Friday for further evaluation of her DVT. We will need to follow her potassium and creatinine closely on Lasix. Her echocardiogram that was performed a month ago in the hospital revealed preserved left ventricular ejection fraction.   We have asked her to start on ASA 81 mg daily given her DVT for now. \par 2. Her blood pressure is adequate at this time, thus she will continue on Isosorbide 60mg daily, Hydralazine 100 mg twice daily, Lasix, and Metoprolol ER 25 mg daily. \par 3. Her Lipitor was discontinued when she was in the hospital. For now she will continue on a low cholesterol diet and we will monitor her fasting lipid panel.\par 4. She will follow up with me in 2 weeks or sooner should she experience any cardiac symptoms in the interim.

## 2019-01-29 ENCOUNTER — MEDICATION RENEWAL (OUTPATIENT)
Age: 79
End: 2019-01-29

## 2019-02-04 ENCOUNTER — APPOINTMENT (OUTPATIENT)
Dept: CARDIOLOGY | Facility: CLINIC | Age: 79
End: 2019-02-04
Payer: MEDICARE

## 2019-02-04 ENCOUNTER — NON-APPOINTMENT (OUTPATIENT)
Age: 79
End: 2019-02-04

## 2019-02-04 VITALS
OXYGEN SATURATION: 96 % | WEIGHT: 197 LBS | DIASTOLIC BLOOD PRESSURE: 78 MMHG | HEART RATE: 91 BPM | SYSTOLIC BLOOD PRESSURE: 155 MMHG | BODY MASS INDEX: 37.19 KG/M2 | HEIGHT: 61 IN | RESPIRATION RATE: 12 BRPM

## 2019-02-04 VITALS — DIASTOLIC BLOOD PRESSURE: 70 MMHG | SYSTOLIC BLOOD PRESSURE: 132 MMHG

## 2019-02-04 PROCEDURE — 99214 OFFICE O/P EST MOD 30 MIN: CPT | Mod: 25

## 2019-02-04 PROCEDURE — 93000 ELECTROCARDIOGRAM COMPLETE: CPT

## 2019-02-04 NOTE — REVIEW OF SYSTEMS
[Recent Weight Gain (___ Lbs)] : recent [unfilled] ~Ulb weight gain [Dyspnea on exertion] : dyspnea during exertion [Lower Ext Edema] : lower extremity edema [Joint Pain] : joint pain [Negative] : Heme/Lymph

## 2019-02-05 LAB
ALBUMIN SERPL ELPH-MCNC: 3.9 G/DL
ALP BLD-CCNC: 62 U/L
ALT SERPL-CCNC: 14 U/L
ANION GAP SERPL CALC-SCNC: 12 MMOL/L
AST SERPL-CCNC: 20 U/L
BILIRUB SERPL-MCNC: 0.2 MG/DL
BUN SERPL-MCNC: 39 MG/DL
CALCIUM SERPL-MCNC: 9.8 MG/DL
CHLORIDE SERPL-SCNC: 98 MMOL/L
CHOLEST SERPL-MCNC: 213 MG/DL
CHOLEST/HDLC SERPL: 3.8 RATIO
CO2 SERPL-SCNC: 31 MMOL/L
CREAT SERPL-MCNC: 1.65 MG/DL
GLUCOSE SERPL-MCNC: 71 MG/DL
HDLC SERPL-MCNC: 56 MG/DL
LDLC SERPL CALC-MCNC: 124 MG/DL
POTASSIUM SERPL-SCNC: 3.8 MMOL/L
PROT SERPL-MCNC: 6.9 G/DL
SODIUM SERPL-SCNC: 141 MMOL/L
TRIGL SERPL-MCNC: 165 MG/DL

## 2019-02-06 ENCOUNTER — APPOINTMENT (OUTPATIENT)
Dept: PAIN MANAGEMENT | Facility: CLINIC | Age: 79
End: 2019-02-06

## 2019-02-07 ENCOUNTER — RX RENEWAL (OUTPATIENT)
Age: 79
End: 2019-02-07

## 2019-02-08 ENCOUNTER — APPOINTMENT (OUTPATIENT)
Dept: CARDIOLOGY | Facility: CLINIC | Age: 79
End: 2019-02-08
Payer: MEDICARE

## 2019-02-08 ENCOUNTER — APPOINTMENT (OUTPATIENT)
Dept: CARDIOLOGY | Facility: CLINIC | Age: 79
End: 2019-02-08

## 2019-02-08 ENCOUNTER — APPOINTMENT (OUTPATIENT)
Dept: INTERNAL MEDICINE | Facility: CLINIC | Age: 79
End: 2019-02-08
Payer: MEDICARE

## 2019-02-08 VITALS
WEIGHT: 200 LBS | BODY MASS INDEX: 37.76 KG/M2 | HEIGHT: 61 IN | HEART RATE: 89 BPM | SYSTOLIC BLOOD PRESSURE: 159 MMHG | DIASTOLIC BLOOD PRESSURE: 76 MMHG | RESPIRATION RATE: 12 BRPM | OXYGEN SATURATION: 96 %

## 2019-02-08 VITALS — DIASTOLIC BLOOD PRESSURE: 80 MMHG | SYSTOLIC BLOOD PRESSURE: 140 MMHG

## 2019-02-08 PROCEDURE — 93970 EXTREMITY STUDY: CPT

## 2019-02-08 PROCEDURE — 93971 EXTREMITY STUDY: CPT

## 2019-02-08 PROCEDURE — 99215 OFFICE O/P EST HI 40 MIN: CPT

## 2019-02-08 RX ORDER — FUROSEMIDE 40 MG/1
40 TABLET ORAL
Qty: 60 | Refills: 0 | Status: COMPLETED | COMMUNITY
Start: 2018-02-07 | End: 2019-02-08

## 2019-02-08 NOTE — HEALTH RISK ASSESSMENT
[No falls in past year] : Patient reported no falls in the past year [3] : 2) Feeling down, depressed, or hopeless for nearly every day (3) [] : No

## 2019-02-08 NOTE — REVIEW OF SYSTEMS
[Lower Ext Edema] : lower extremity edema [Negative] : Heme/Lymph [Unsteady Walking] : ataxia [Anxiety] : anxiety [Depression] : depression [Chest Pain] : no chest pain [Palpitations] : no palpitations [Leg Claudication] : no leg claudication [Orthopnea] : no orthopnea [Paroysmal Nocturnal Dyspnea] : no paroysmal nocturnal dyspnea [Headache] : no headache [Dizziness] : no dizziness [Fainting] : no fainting [Confusion] : no confusion [Memory Loss] : no memory loss [Suicidal] : not suicidal [Insomnia] : no insomnia

## 2019-02-08 NOTE — PHYSICAL EXAM
[No Acute Distress] : no acute distress [Well Nourished] : well nourished [Well Developed] : well developed [Well-Appearing] : well-appearing [Normal Sclera/Conjunctiva] : normal sclera/conjunctiva [PERRL] : pupils equal round and reactive to light [EOMI] : extraocular movements intact [Normal Outer Ear/Nose] : the outer ears and nose were normal in appearance [Normal Oropharynx] : the oropharynx was normal [No JVD] : no jugular venous distention [Supple] : supple [No Lymphadenopathy] : no lymphadenopathy [Thyroid Normal, No Nodules] : the thyroid was normal and there were no nodules present [No Respiratory Distress] : no respiratory distress  [Clear to Auscultation] : lungs were clear to auscultation bilaterally [No Accessory Muscle Use] : no accessory muscle use [Normal Rate] : normal rate  [Regular Rhythm] : with a regular rhythm [Normal S1, S2] : normal S1 and S2 [No Murmur] : no murmur heard [No Carotid Bruits] : no carotid bruits [No Abdominal Bruit] : a ~M bruit was not heard ~T in the abdomen [No Varicosities] : no varicosities [No Extremity Clubbing/Cyanosis] : no extremity clubbing/cyanosis [No Palpable Aorta] : no palpable aorta [Soft] : abdomen soft [Non Tender] : non-tender [Non-distended] : non-distended [No Masses] : no abdominal mass palpated [No HSM] : no HSM [Normal Bowel Sounds] : normal bowel sounds [Normal Posterior Cervical Nodes] : no posterior cervical lymphadenopathy [Normal Anterior Cervical Nodes] : no anterior cervical lymphadenopathy [No CVA Tenderness] : no CVA  tenderness [No Spinal Tenderness] : no spinal tenderness [No Joint Swelling] : no joint swelling [Grossly Normal Strength/Tone] : grossly normal strength/tone [No Rash] : no rash [Normal Gait] : normal gait [Coordination Grossly Intact] : coordination grossly intact [No Focal Deficits] : no focal deficits [Deep Tendon Reflexes (DTR)] : deep tendon reflexes were 2+ and symmetric [Normal Affect] : the affect was normal [Normal Insight/Judgement] : insight and judgment were intact [de-identified] : 3+ feeding, edema, with evidence of cellulitis and chronic stasis dermatitis, right lower extremity greater than left lower extremity. No calf tenderness, Homans sign is negative.

## 2019-02-10 ENCOUNTER — NON-APPOINTMENT (OUTPATIENT)
Age: 79
End: 2019-02-10

## 2019-02-10 NOTE — HISTORY OF PRESENT ILLNESS
[FreeTextEntry1] : Patient is an 78 year old woman with a history of HTN, hyperlipidemia, type 2 diabetes mellitus, breast cancer status post right partial mastectomy, rectal carcinoma status post resection, anemia, hypokalemia, diastolic heart failure, and renal insufficiency who presents today for follow up of HTN and lower extremity edema. She increased her Lasix to 80mg daily, however, she states that her lower extremity edema has not improved. She has been taking a low dose aspirin since seeing Vascular Cardiology for a superficial right leg DVT. Her gabapentin was increased to 300mg at nighttime for her chronic knee pain.  She otherwise denies any chest pain, palpitations, dyspnea at rest, headaches, or palpitations. Her right upper extremity swelling due to chronic lymphedema and dyspnea with exertion are unchanged.

## 2019-02-10 NOTE — DISCUSSION/SUMMARY
[FreeTextEntry1] : IMPRESSION: Ms. Batista is an 78 year old woman with a history of HTN, hyperlipidemia, type 2 diabetes mellitus, breast cancer status post right partial mastectomy, rectal carcinoma status post resection, diastolic heart failure, anemia, hypokalemia, and renal insufficiency who presents today for follow up of HTN and lower extremity edema. \par \par PLAN:\par 1. Given her continued lower extremity edema, she will continue on Lasix 80mg daily and we will check her potassium and creatinine today. If her potassium and creatinine are okay we will consider starting her on Spironolactone or Metolazone for improved diuresis. She will continue on the ASA 81mg daily for her DVT and she will follow up with Vascular on Friday for a lower extremity sonogram. Her echocardiogram that was performed 2 months ago in the hospital revealed preserved left ventricular ejection fraction. I am also concerned that her edema may be secondary to propagation of her DVT.\par 2. Her blood pressure is adequate at this time, thus she will continue on Isosorbide 60mg daily, Hydralazine 100 mg twice daily, Lasix, and Metoprolol ER 25 mg daily. \par 3. Her Lipitor was discontinued when she was in the hospital. We will check her fasting lipids today and consider starting her back on Atorvastatin 10mg daily along with a low cholesterol diet. \par 4. She will follow up with me in 3 weeks or sooner should she experience any cardiac symptoms in the interim.

## 2019-02-10 NOTE — PHYSICAL EXAM
[General Appearance - Well Developed] : well developed [Normal Appearance] : normal appearance [General Appearance - Well Nourished] : well nourished [No Deformities] : no deformities [Normal Conjunctiva] : the conjunctiva exhibited no abnormalities [Normal Oral Mucosa] : normal oral mucosa [No Oral Pallor] : no oral pallor [No Oral Cyanosis] : no oral cyanosis [Normal Jugular Venous A Waves Present] : normal jugular venous A waves present [Normal Jugular Venous V Waves Present] : normal jugular venous V waves present [Respiration, Rhythm And Depth] : normal respiratory rhythm and effort [Exaggerated Use Of Accessory Muscles For Inspiration] : no accessory muscle use [Auscultation Breath Sounds / Voice Sounds] : lungs were clear to auscultation bilaterally [Normal Rate] : normal [Rhythm Regular] : regular [Normal S1] : normal S1 [Normal S2] : normal S2 [II] : a grade 2 [___ +] : bilateral [unfilled]U+ pretibial pitting edema [Abdomen Soft] : soft [Abdomen Tenderness] : non-tender [Nail Clubbing] : no clubbing of the fingernails [Cyanosis, Localized] : no localized cyanosis [Petechial Hemorrhages (___cm)] : no petechial hemorrhages [Skin Color & Pigmentation] : normal skin color and pigmentation [] : no rash [Skin Lesions] : no skin lesions [Oriented To Time, Place, And Person] : oriented to person, place, and time [Affect] : the affect was normal [Mood] : the mood was normal [No Anxiety] : not feeling anxious [Well Groomed] : well groomed [General Appearance - In No Acute Distress] : no acute distress [Right Carotid Bruit] : no bruit heard over the right carotid [Left Carotid Bruit] : no bruit heard over the left carotid [Bruit] : no bruit heard [Bowel Sounds] : normal bowel sounds [FreeTextEntry1] : Her gait is slow. [No Skin Ulcers] : no skin ulcer

## 2019-02-21 ENCOUNTER — APPOINTMENT (OUTPATIENT)
Dept: INTERNAL MEDICINE | Facility: CLINIC | Age: 79
End: 2019-02-21
Payer: MEDICARE

## 2019-02-21 ENCOUNTER — LABORATORY RESULT (OUTPATIENT)
Age: 79
End: 2019-02-21

## 2019-02-21 VITALS
TEMPERATURE: 98.2 F | HEART RATE: 80 BPM | OXYGEN SATURATION: 94 % | HEIGHT: 61 IN | SYSTOLIC BLOOD PRESSURE: 168 MMHG | WEIGHT: 202 LBS | BODY MASS INDEX: 38.14 KG/M2 | RESPIRATION RATE: 12 BRPM | DIASTOLIC BLOOD PRESSURE: 74 MMHG

## 2019-02-21 VITALS — DIASTOLIC BLOOD PRESSURE: 80 MMHG | SYSTOLIC BLOOD PRESSURE: 130 MMHG

## 2019-02-21 PROCEDURE — 99215 OFFICE O/P EST HI 40 MIN: CPT

## 2019-02-21 NOTE — PHYSICAL EXAM

## 2019-02-22 LAB
ALBUMIN SERPL ELPH-MCNC: 4.3 G/DL
ALP BLD-CCNC: 68 U/L
ALT SERPL-CCNC: 11 U/L
ANION GAP SERPL CALC-SCNC: 16 MMOL/L
APPEARANCE: CLEAR
AST SERPL-CCNC: 16 U/L
BASOPHILS # BLD AUTO: 0.01 K/UL
BASOPHILS NFR BLD AUTO: 0.1 %
BILIRUB SERPL-MCNC: 0.2 MG/DL
BILIRUBIN URINE: NEGATIVE
BLOOD URINE: NEGATIVE
BUN SERPL-MCNC: 36 MG/DL
CALCIUM SERPL-MCNC: 9.8 MG/DL
CHLORIDE SERPL-SCNC: 102 MMOL/L
CHOLEST SERPL-MCNC: 161 MG/DL
CHOLEST/HDLC SERPL: 2.9 RATIO
CO2 SERPL-SCNC: 24 MMOL/L
COLOR: NORMAL
CREAT SERPL-MCNC: 1.74 MG/DL
CREAT SPEC-SCNC: 33 MG/DL
EOSINOPHIL # BLD AUTO: 0.02 K/UL
EOSINOPHIL NFR BLD AUTO: 0.2 %
GLUCOSE QUALITATIVE U: NEGATIVE
GLUCOSE SERPL-MCNC: 112 MG/DL
HBA1C MFR BLD HPLC: 6.7 %
HCT VFR BLD CALC: 41.1 %
HDLC SERPL-MCNC: 56 MG/DL
HGB BLD-MCNC: 12.1 G/DL
IMM GRANULOCYTES NFR BLD AUTO: 0.4 %
KETONES URINE: NEGATIVE
LDLC SERPL CALC-MCNC: 70 MG/DL
LEUKOCYTE ESTERASE URINE: ABNORMAL
LYMPHOCYTES # BLD AUTO: 2.04 K/UL
LYMPHOCYTES NFR BLD AUTO: 20.9 %
MAGNESIUM SERPL-MCNC: 2 MG/DL
MAN DIFF?: NORMAL
MCHC RBC-ENTMCNC: 29.2 PG
MCHC RBC-ENTMCNC: 29.4 GM/DL
MCV RBC AUTO: 99 FL
MICROALBUMIN 24H UR DL<=1MG/L-MCNC: <1.2 MG/DL
MICROALBUMIN/CREAT 24H UR-RTO: NORMAL MG/G
MONOCYTES # BLD AUTO: 0.8 K/UL
MONOCYTES NFR BLD AUTO: 8.2 %
NEUTROPHILS # BLD AUTO: 6.86 K/UL
NEUTROPHILS NFR BLD AUTO: 70.2 %
NITRITE URINE: NEGATIVE
PH URINE: 6
PLATELET # BLD AUTO: 362 K/UL
POTASSIUM SERPL-SCNC: 4 MMOL/L
PROT SERPL-MCNC: 7.3 G/DL
PROTEIN URINE: NEGATIVE
RBC # BLD: 4.15 M/UL
RBC # FLD: 15.7 %
SODIUM SERPL-SCNC: 142 MMOL/L
SPECIFIC GRAVITY URINE: 1.01
TRIGL SERPL-MCNC: 174 MG/DL
TSH SERPL-ACNC: 1.88 UIU/ML
UROBILINOGEN URINE: NORMAL
WBC # FLD AUTO: 9.77 K/UL

## 2019-03-01 ENCOUNTER — APPOINTMENT (OUTPATIENT)
Dept: CARDIOLOGY | Facility: CLINIC | Age: 79
End: 2019-03-01
Payer: MEDICARE

## 2019-03-01 VITALS
BODY MASS INDEX: 37.38 KG/M2 | DIASTOLIC BLOOD PRESSURE: 70 MMHG | HEIGHT: 61 IN | HEART RATE: 76 BPM | WEIGHT: 198 LBS | OXYGEN SATURATION: 95 % | RESPIRATION RATE: 12 BRPM | TEMPERATURE: 98.6 F | SYSTOLIC BLOOD PRESSURE: 188 MMHG

## 2019-03-01 PROCEDURE — 99215 OFFICE O/P EST HI 40 MIN: CPT

## 2019-03-01 NOTE — HISTORY OF PRESENT ILLNESS
[FreeTextEntry1] : 3/1/2019\par DVT propagated on most recent duplex - started on Eliquis 5mg BID - three weeks already.  No bleeding or bruising. No blood in urine.  No blood in stool.  Not wearing compression.  The legs "feel a little better" but she is not wearing compression.  R leg is more swollen than the left.  \par Has not been seen by breast cancer doc or Dr. Mccray yet.  \par seeing dr. Hagan next week\par Admits to decreased mobility. walks with a walker

## 2019-03-01 NOTE — ASSESSMENT
[FreeTextEntry1] : Assessment:\par 1.  R DVT\par 2.  Recent hospitalization\par 3.  history of breast cancer\par 4.  History of Rectal CA\par 5.  Right arm lymphedema. \par 6.  Mild pulmonary HTN\par \par Plan\par 1.  Although immobility and illness recently, need to rule out malignancy as a possible cause of new DVT:\par -Followup with breast cancer doc for malignancy screening\par - Follow up with Dr. Mccray for malignancy screening\par 2.   Labwork today  - cbc, cmp, d-dimer, factor VIII\par 3.  Continue eliquis 5mg BID for now\par 4.   Return in 2 months - repeat LE venous duplex at that time. \par 5.  Discussed with daughter in detail.

## 2019-03-01 NOTE — PHYSICAL EXAM
[General Appearance - Well Developed] : well developed [Normal Appearance] : normal appearance [Well Groomed] : well groomed [General Appearance - Well Nourished] : well nourished [No Deformities] : no deformities [General Appearance - In No Acute Distress] : no acute distress [Normal Conjunctiva] : the conjunctiva exhibited no abnormalities [Eyelids - No Xanthelasma] : the eyelids demonstrated no xanthelasmas [Normal Oral Mucosa] : normal oral mucosa [No Oral Pallor] : no oral pallor [No Oral Cyanosis] : no oral cyanosis [Normal Jugular Venous A Waves Present] : normal jugular venous A waves present [Normal Jugular Venous V Waves Present] : normal jugular venous V waves present [No Jugular Venous Amaya A Waves] : no jugular venous amaya A waves [Respiration, Rhythm And Depth] : normal respiratory rhythm and effort [Exaggerated Use Of Accessory Muscles For Inspiration] : no accessory muscle use [Auscultation Breath Sounds / Voice Sounds] : lungs were clear to auscultation bilaterally [Heart Rate And Rhythm] : heart rate and rhythm were normal [Heart Sounds] : normal S1 and S2 [Murmurs] : no murmurs present [Abdomen Soft] : soft [Abdomen Tenderness] : non-tender [Abdomen Mass (___ Cm)] : no abdominal mass palpated [Abnormal Walk] : normal gait [Gait - Sufficient For Exercise Testing] : the gait was sufficient for exercise testing [Skin Color & Pigmentation] : normal skin color and pigmentation [] : no rash [No Venous Stasis] : no venous stasis [Skin Lesions] : no skin lesions [No Skin Ulcers] : no skin ulcer [No Xanthoma] : no  xanthoma was observed [Oriented To Time, Place, And Person] : oriented to person, place, and time [Affect] : the affect was normal [Mood] : the mood was normal [No Anxiety] : not feeling anxious [FreeTextEntry1] : LE edema R>L.  RUE lymphedema.    right knee replacement.  Strong pedal pulses.

## 2019-03-04 ENCOUNTER — APPOINTMENT (OUTPATIENT)
Dept: CARDIOLOGY | Facility: CLINIC | Age: 79
End: 2019-03-04
Payer: MEDICARE

## 2019-03-04 ENCOUNTER — NON-APPOINTMENT (OUTPATIENT)
Age: 79
End: 2019-03-04

## 2019-03-04 VITALS
OXYGEN SATURATION: 95 % | TEMPERATURE: 98 F | HEIGHT: 61 IN | DIASTOLIC BLOOD PRESSURE: 79 MMHG | RESPIRATION RATE: 12 BRPM | BODY MASS INDEX: 37 KG/M2 | WEIGHT: 196 LBS | HEART RATE: 88 BPM | SYSTOLIC BLOOD PRESSURE: 173 MMHG

## 2019-03-04 VITALS — SYSTOLIC BLOOD PRESSURE: 150 MMHG | DIASTOLIC BLOOD PRESSURE: 70 MMHG

## 2019-03-04 LAB
ALBUMIN SERPL ELPH-MCNC: 4.3 G/DL
ALP BLD-CCNC: 70 U/L
ALT SERPL-CCNC: 12 U/L
ANION GAP SERPL CALC-SCNC: 16 MMOL/L
APPEARANCE: CLEAR
AST SERPL-CCNC: 16 U/L
BASOPHILS # BLD AUTO: 0.03 K/UL
BASOPHILS NFR BLD AUTO: 0.4 %
BILIRUB SERPL-MCNC: 0.2 MG/DL
BILIRUBIN URINE: NEGATIVE
BLOOD URINE: NEGATIVE
BUN SERPL-MCNC: 35 MG/DL
CALCIUM SERPL-MCNC: 10 MG/DL
CHLORIDE SERPL-SCNC: 99 MMOL/L
CO2 SERPL-SCNC: 27 MMOL/L
COLOR: NORMAL
CREAT SERPL-MCNC: 1.81 MG/DL
DEPRECATED D DIMER PPP IA-ACNC: 364 NG/ML DDU
EOSINOPHIL # BLD AUTO: 0.02 K/UL
EOSINOPHIL NFR BLD AUTO: 0.2 %
FACT VIII ACT/NOR PPP: 106 %
GLUCOSE QUALITATIVE U: NEGATIVE
GLUCOSE SERPL-MCNC: 103 MG/DL
HCT VFR BLD CALC: 39.8 %
HGB BLD-MCNC: 12 G/DL
IMM GRANULOCYTES NFR BLD AUTO: 0.2 %
KETONES URINE: NEGATIVE
LEUKOCYTE ESTERASE URINE: NEGATIVE
LYMPHOCYTES # BLD AUTO: 1.79 K/UL
LYMPHOCYTES NFR BLD AUTO: 21.8 %
MAN DIFF?: NORMAL
MCHC RBC-ENTMCNC: 29.2 PG
MCHC RBC-ENTMCNC: 30.2 GM/DL
MCV RBC AUTO: 96.8 FL
MONOCYTES # BLD AUTO: 0.72 K/UL
MONOCYTES NFR BLD AUTO: 8.8 %
NEUTROPHILS # BLD AUTO: 5.62 K/UL
NEUTROPHILS NFR BLD AUTO: 68.6 %
NITRITE URINE: NEGATIVE
PH URINE: 6.5
PLATELET # BLD AUTO: 315 K/UL
POTASSIUM SERPL-SCNC: 4 MMOL/L
PROT SERPL-MCNC: 7.2 G/DL
PROTEIN URINE: NORMAL
RBC # BLD: 4.11 M/UL
RBC # FLD: 15.4 %
SODIUM SERPL-SCNC: 142 MMOL/L
SPECIFIC GRAVITY URINE: 1.02
UROBILINOGEN URINE: NORMAL
WBC # FLD AUTO: 8.2 K/UL

## 2019-03-04 PROCEDURE — 93000 ELECTROCARDIOGRAM COMPLETE: CPT

## 2019-03-04 PROCEDURE — 99214 OFFICE O/P EST MOD 30 MIN: CPT | Mod: 25

## 2019-03-04 NOTE — PHYSICAL EXAM
[General Appearance - Well Developed] : well developed [Normal Appearance] : normal appearance [General Appearance - Well Nourished] : well nourished [No Deformities] : no deformities [Normal Conjunctiva] : the conjunctiva exhibited no abnormalities [Normal Oral Mucosa] : normal oral mucosa [No Oral Pallor] : no oral pallor [No Oral Cyanosis] : no oral cyanosis [Normal Jugular Venous A Waves Present] : normal jugular venous A waves present [Normal Jugular Venous V Waves Present] : normal jugular venous V waves present [Respiration, Rhythm And Depth] : normal respiratory rhythm and effort [Exaggerated Use Of Accessory Muscles For Inspiration] : no accessory muscle use [Auscultation Breath Sounds / Voice Sounds] : lungs were clear to auscultation bilaterally [Normal Rate] : normal [Rhythm Regular] : regular [Normal S1] : normal S1 [Normal S2] : normal S2 [II] : a grade 2 [___ +] : bilateral [unfilled]U+ pretibial pitting edema [Abdomen Soft] : soft [Abdomen Tenderness] : non-tender [Nail Clubbing] : no clubbing of the fingernails [Cyanosis, Localized] : no localized cyanosis [Petechial Hemorrhages (___cm)] : no petechial hemorrhages [Skin Color & Pigmentation] : normal skin color and pigmentation [] : no rash [Oriented To Time, Place, And Person] : oriented to person, place, and time [Affect] : the affect was normal [Mood] : the mood was normal [No Anxiety] : not feeling anxious [Well Groomed] : well groomed [General Appearance - In No Acute Distress] : no acute distress [Right Carotid Bruit] : no bruit heard over the right carotid [Left Carotid Bruit] : no bruit heard over the left carotid [Bruit] : no bruit heard [Bowel Sounds] : normal bowel sounds [No Skin Ulcers] : no skin ulcer [FreeTextEntry1] : Extensive ecchymosis to left antecubital area with mild thickening and tenderness, possibly secondary to a phlebitis.

## 2019-03-04 NOTE — REVIEW OF SYSTEMS
[Dyspnea on exertion] : dyspnea during exertion [Lower Ext Edema] : lower extremity edema [Joint Pain] : joint pain [Negative] : Heme/Lymph [Joint Swelling] : joint swelling

## 2019-03-04 NOTE — DISCUSSION/SUMMARY
[FreeTextEntry1] : IMPRESSION: Ms. Batista is an 79 year old woman with a history of HTN, hyperlipidemia, type 2 diabetes mellitus, breast cancer status post right partial mastectomy, rectal carcinoma status post resection, diastolic heart failure, anemia, hypokalemia, and renal insufficiency who presents today for follow up of HTN and lower extremity edema. \par \par PLAN:\par 1. Given her improved lower extremity edema, she will continue on Bumex 1mg BID and Spironolactone 12.5mg daily. She will continue on the Eliquis for her DVT as prescribed by Vascular and she will continue to follow up with Vascular Cardiology. Her echocardiogram that was performed 3 months ago in the hospital revealed preserved left ventricular ejection fraction.\par 2. Her blood pressure is elevated at this time, however, she is also in increased pain. She will continue on Isosorbide 60mg daily, Hydralazine 100 mg twice daily, Lasix, and Metoprolol ER 25 mg daily. She will start taking the oxycodone again as needed for pain and continue to monitor her blood pressure closely at home and follow up if it is elevated. Her daughter will notify us if her blood pressure is elevated at home. \par 3. Her fasting lipid panel has improved, therefore she will continue on Atorvastatin 10mg every other day along with a low cholesterol diet. \par 4. For the ecchymosis on her left antecubital she will apply warm compresses followed by cold compresses and continue to monitor it closely. If it does not improve within the next couple of days, then  she will let us know. \par 5. She will follow up with me in 3 months or sooner should she experience any cardiac symptoms in the interim. She will monitor her pressure closely and follow up sooner if it is elevated.

## 2019-03-04 NOTE — HISTORY OF PRESENT ILLNESS
[FreeTextEntry1] : Patient is an 79 year old woman with a history of HTN, hyperlipidemia, type 2 diabetes mellitus, breast cancer status post right partial mastectomy, rectal carcinoma status post resection, anemia, hypokalemia, diastolic heart failure, and renal insufficiency who presents today for follow up of HTN and lower extremity edema. She has started taking Bumex 1mg BID and Spironolactone 12.5mg daily with improvement in her lower extremity edema and was also started on Eliquis 5mg twice daily for a right leg DVT by vascular cardiology. She developed a UTI for which she was given antibiotics, however she had to stop taking the oxycodone during that time as the antibiotic interacted with oxycodone. Since then she has had increased pain levels. She has a bruise on her left antecubital fossa following a blood draw last week. She otherwise denies any chest pain, palpitations, dyspnea at rest, headaches, or palpitations. Her right upper extremity swelling due to chronic lymphedema and dyspnea with exertion are unchanged. She states that her blood pressure the last 2 weeks has been elevated which roughly correlates to when she stopped taking Oxycodone. She states that her leg pain appears to be worse now that she is off of Oxycodone.

## 2019-03-08 RX ORDER — LINAGLIPTIN 5 MG/1
5 TABLET, FILM COATED ORAL
Qty: 90 | Refills: 3 | Status: DISCONTINUED | COMMUNITY
Start: 2018-04-12 | End: 2019-03-08

## 2019-03-25 ENCOUNTER — RX CHANGE (OUTPATIENT)
Age: 79
End: 2019-03-25

## 2019-03-26 NOTE — H&P ADULT - EJECTION FRACTION >40 YES
Rx prescriptions were picked up 3/26/2019 by Isrrael Bangura, patients son. Copy of photo ID was taken.    Ejection Fraction...

## 2019-04-02 ENCOUNTER — RX CHANGE (OUTPATIENT)
Age: 79
End: 2019-04-02

## 2019-04-18 ENCOUNTER — RX RENEWAL (OUTPATIENT)
Age: 79
End: 2019-04-18

## 2019-04-22 ENCOUNTER — RX CHANGE (OUTPATIENT)
Age: 79
End: 2019-04-22

## 2019-04-22 ENCOUNTER — RX RENEWAL (OUTPATIENT)
Age: 79
End: 2019-04-22

## 2019-04-29 ENCOUNTER — RX CHANGE (OUTPATIENT)
Age: 79
End: 2019-04-29

## 2019-04-29 ENCOUNTER — APPOINTMENT (OUTPATIENT)
Dept: INTERNAL MEDICINE | Facility: CLINIC | Age: 79
End: 2019-04-29
Payer: MEDICARE

## 2019-04-29 VITALS
RESPIRATION RATE: 14 BRPM | WEIGHT: 191 LBS | DIASTOLIC BLOOD PRESSURE: 68 MMHG | BODY MASS INDEX: 36.06 KG/M2 | HEART RATE: 76 BPM | SYSTOLIC BLOOD PRESSURE: 151 MMHG | HEIGHT: 61 IN | OXYGEN SATURATION: 96 % | TEMPERATURE: 97.9 F

## 2019-04-29 VITALS — SYSTOLIC BLOOD PRESSURE: 130 MMHG | DIASTOLIC BLOOD PRESSURE: 80 MMHG

## 2019-04-29 PROCEDURE — 99214 OFFICE O/P EST MOD 30 MIN: CPT

## 2019-04-29 NOTE — REVIEW OF SYSTEMS
[Joint Pain] : joint pain [Joint Stiffness] : joint stiffness [Unsteady Walking] : ataxia [Negative] : Heme/Lymph [Joint Swelling] : no joint swelling [Back Pain] : no back pain [FreeTextEntry9] : RIGHT HIP

## 2019-04-29 NOTE — PHYSICAL EXAM

## 2019-05-01 ENCOUNTER — RX RENEWAL (OUTPATIENT)
Age: 79
End: 2019-05-01

## 2019-05-10 ENCOUNTER — APPOINTMENT (OUTPATIENT)
Dept: CARDIOLOGY | Facility: CLINIC | Age: 79
End: 2019-05-10
Payer: MEDICARE

## 2019-05-10 VITALS
SYSTOLIC BLOOD PRESSURE: 145 MMHG | DIASTOLIC BLOOD PRESSURE: 68 MMHG | OXYGEN SATURATION: 98 % | RESPIRATION RATE: 14 BRPM | HEIGHT: 61 IN | HEART RATE: 80 BPM

## 2019-05-10 DIAGNOSIS — I83.90 ASYMPTOMATIC VARICOSE VEINS OF UNSPECIFIED LOWER EXTREMITY: ICD-10-CM

## 2019-05-10 PROCEDURE — XXXXX: CPT

## 2019-05-10 PROCEDURE — 93970 EXTREMITY STUDY: CPT

## 2019-05-10 PROCEDURE — 99215 OFFICE O/P EST HI 40 MIN: CPT

## 2019-05-10 NOTE — PHYSICAL EXAM
[Well Groomed] : well groomed [Normal Appearance] : normal appearance [General Appearance - Well Developed] : well developed [General Appearance - Well Nourished] : well nourished [No Deformities] : no deformities [General Appearance - In No Acute Distress] : no acute distress [Normal Conjunctiva] : the conjunctiva exhibited no abnormalities [Eyelids - No Xanthelasma] : the eyelids demonstrated no xanthelasmas [Normal Oral Mucosa] : normal oral mucosa [No Oral Pallor] : no oral pallor [No Oral Cyanosis] : no oral cyanosis [Normal Jugular Venous A Waves Present] : normal jugular venous A waves present [Normal Jugular Venous V Waves Present] : normal jugular venous V waves present [No Jugular Venous Amaya A Waves] : no jugular venous amaya A waves [Respiration, Rhythm And Depth] : normal respiratory rhythm and effort [Exaggerated Use Of Accessory Muscles For Inspiration] : no accessory muscle use [Auscultation Breath Sounds / Voice Sounds] : lungs were clear to auscultation bilaterally [Heart Sounds] : normal S1 and S2 [Heart Rate And Rhythm] : heart rate and rhythm were normal [Murmurs] : no murmurs present [Abdomen Soft] : soft [Abdomen Tenderness] : non-tender [Abnormal Walk] : normal gait [Abdomen Mass (___ Cm)] : no abdominal mass palpated [Gait - Sufficient For Exercise Testing] : the gait was sufficient for exercise testing [] : no rash [Skin Color & Pigmentation] : normal skin color and pigmentation [No Venous Stasis] : no venous stasis [Skin Lesions] : no skin lesions [No Xanthoma] : no  xanthoma was observed [No Skin Ulcers] : no skin ulcer [Oriented To Time, Place, And Person] : oriented to person, place, and time [Mood] : the mood was normal [Affect] : the affect was normal [No Anxiety] : not feeling anxious [FreeTextEntry1] : LE edema R>L.  RUE lymphedema.    right knee replacement.  Strong pedal pulses.

## 2019-05-10 NOTE — ASSESSMENT
[FreeTextEntry1] : Assessment:\par 1.  R DVT\par 2.  Recent hospitalization\par 3.  history of breast cancer\par 4.  History of Rectal CA\par 5.  Right arm lymphedema. \par 6.  Mild pulmonary HTN\par \par Plan\par 1.  Although immobility and illness recently, need to rule out malignancy as a possible cause of new DVT:\par -Followup with breast cancer doc for malignancy screening\par - Follow up with Dr. Mccray for malignancy screening\par 2.   Await results of duplex\par 3.  Send D-dimer and Factor VIII testing.  \par 4.  Depending on #2 and # 3 may consider decreasing dose of Eliquis to 2.5mg BID given ongoing risk factors of obesity and immobility\par 5.  If testing negative and hip replacement is needed, would treat will full dose eliquis for 3 months post operatively. \par 6 If testing reveals persistent DVT/ elevated dimer - then would hold off on surgery (too high risk)\par \par [phone] - Grace

## 2019-05-10 NOTE — HISTORY OF PRESENT ILLNESS
[FreeTextEntry1] : Followup visit 5/10/2019\par Complains of hip pains.  Lots of pain.  uses a rolling walker.  Seeing orthopedic sx tomorrow.  Remains on eliquis 5mg BID.   No bleeding or bruising.  Not wearing compression.  No swelling.  Has not seen by breast surgeon yet (for malignancy screening).  Is being seen by a pain management doc in [place], describes a nerve conduciton test - was told she has nerve damage, rec pain meds.  Duplex of the veins is scheduled later today\par \par 3/1/2019\par DVT propagated on most recent duplex - started on Eliquis 5mg BID - three weeks already.  No bleeding or bruising. No blood in urine.  No blood in stool.  Not wearing compression.  The legs "feel a little better" but she is not wearing compression.  R leg is more swollen than the left.  \par Has not been seen by breast cancer doc or Dr. Mccray yet.  \par seeing dr. Hagan next week\par Admits to decreased mobility. walks with a walker

## 2019-05-10 NOTE — REVIEW OF SYSTEMS
[Lower Ext Edema] : lower extremity edema [Negative] : Heme/Lymph [Joint Pain] : joint pain [Joint Stiffness] : joint stiffness [FreeTextEntry1] : RUE edema

## 2019-05-11 ENCOUNTER — APPOINTMENT (OUTPATIENT)
Dept: ORTHOPEDIC SURGERY | Facility: CLINIC | Age: 79
End: 2019-05-11
Payer: MEDICARE

## 2019-05-11 VITALS
HEART RATE: 61 BPM | WEIGHT: 190 LBS | SYSTOLIC BLOOD PRESSURE: 154 MMHG | DIASTOLIC BLOOD PRESSURE: 61 MMHG | HEIGHT: 57 IN | BODY MASS INDEX: 40.99 KG/M2

## 2019-05-11 PROCEDURE — 73502 X-RAY EXAM HIP UNI 2-3 VIEWS: CPT

## 2019-05-11 PROCEDURE — 99214 OFFICE O/P EST MOD 30 MIN: CPT

## 2019-05-13 LAB
DEPRECATED D DIMER PPP IA-ACNC: 227 NG/ML DDU
FACT VIII ACT/NOR PPP: 170 %

## 2019-05-13 RX ORDER — APIXABAN 5 MG/1
5 TABLET, FILM COATED ORAL
Qty: 60 | Refills: 5 | Status: DISCONTINUED | COMMUNITY
Start: 2019-02-08 | End: 2019-05-13

## 2019-05-14 LAB
25(OH)D3 SERPL-MCNC: 58.4 NG/ML
ALBUMIN SERPL ELPH-MCNC: 4.4 G/DL
ALP BLD-CCNC: 61 U/L
ALT SERPL-CCNC: 13 U/L
ANION GAP SERPL CALC-SCNC: 17 MMOL/L
APPEARANCE: CLEAR
AST SERPL-CCNC: 15 U/L
BASOPHILS # BLD AUTO: 0.03 K/UL
BASOPHILS NFR BLD AUTO: 0.3 %
BILIRUB SERPL-MCNC: 0.3 MG/DL
BILIRUBIN URINE: NEGATIVE
BLOOD URINE: NEGATIVE
BUN SERPL-MCNC: 46 MG/DL
CALCIUM SERPL-MCNC: 10.4 MG/DL
CHLORIDE SERPL-SCNC: 97 MMOL/L
CO2 SERPL-SCNC: 26 MMOL/L
COLOR: NORMAL
CREAT SERPL-MCNC: 2.02 MG/DL
CREAT SPEC-SCNC: 54 MG/DL
EOSINOPHIL # BLD AUTO: 0.01 K/UL
EOSINOPHIL NFR BLD AUTO: 0.1 %
ESTIMATED AVERAGE GLUCOSE: 146 MG/DL
GLUCOSE QUALITATIVE U: NEGATIVE
GLUCOSE SERPL-MCNC: 84 MG/DL
GLUCOSE SERPL-MCNC: 89 MG/DL
HBA1C MFR BLD HPLC: 6.7 %
HCT VFR BLD CALC: 45 %
HGB BLD-MCNC: 13.7 G/DL
IMM GRANULOCYTES NFR BLD AUTO: 0.2 %
KETONES URINE: NEGATIVE
LEUKOCYTE ESTERASE URINE: NEGATIVE
LYMPHOCYTES # BLD AUTO: 2.25 K/UL
LYMPHOCYTES NFR BLD AUTO: 22.4 %
MAN DIFF?: NORMAL
MCHC RBC-ENTMCNC: 29.6 PG
MCHC RBC-ENTMCNC: 30.4 GM/DL
MCV RBC AUTO: 97.2 FL
MICROALBUMIN 24H UR DL<=1MG/L-MCNC: 1.2 MG/DL
MICROALBUMIN/CREAT 24H UR-RTO: 22 MG/G
MONOCYTES # BLD AUTO: 0.66 K/UL
MONOCYTES NFR BLD AUTO: 6.6 %
NEUTROPHILS # BLD AUTO: 7.07 K/UL
NEUTROPHILS NFR BLD AUTO: 70.4 %
NITRITE URINE: NEGATIVE
PH URINE: 8.5
PLATELET # BLD AUTO: 335 K/UL
POTASSIUM SERPL-SCNC: 4 MMOL/L
PROT SERPL-MCNC: 7.6 G/DL
PROTEIN URINE: NORMAL
RBC # BLD: 4.63 M/UL
RBC # FLD: 14.6 %
SODIUM SERPL-SCNC: 139 MMOL/L
SPECIFIC GRAVITY URINE: 1.02
TSH SERPL-ACNC: 1.31 UIU/ML
UROBILINOGEN URINE: NORMAL
VIT B12 SERPL-MCNC: 910 PG/ML
WBC # FLD AUTO: 10.04 K/UL

## 2019-05-15 ENCOUNTER — APPOINTMENT (OUTPATIENT)
Dept: ORTHOPEDIC SURGERY | Facility: CLINIC | Age: 79
End: 2019-05-15
Payer: MEDICARE

## 2019-05-15 VITALS — SYSTOLIC BLOOD PRESSURE: 152 MMHG | HEART RATE: 69 BPM | DIASTOLIC BLOOD PRESSURE: 76 MMHG

## 2019-05-15 PROCEDURE — 99213 OFFICE O/P EST LOW 20 MIN: CPT

## 2019-05-15 RX ORDER — ALBUTEROL SULFATE 2.5 MG/3ML
(2.5 MG/3ML) SOLUTION RESPIRATORY (INHALATION)
Qty: 1 | Refills: 3 | Status: DISCONTINUED | COMMUNITY
Start: 2018-04-26 | End: 2019-05-15

## 2019-05-15 RX ORDER — CEPHALEXIN 500 MG/1
500 CAPSULE ORAL
Qty: 28 | Refills: 0 | Status: DISCONTINUED | COMMUNITY
Start: 2019-01-20 | End: 2019-05-15

## 2019-05-20 ENCOUNTER — APPOINTMENT (OUTPATIENT)
Dept: INTERNAL MEDICINE | Facility: CLINIC | Age: 79
End: 2019-05-20
Payer: MEDICARE

## 2019-05-20 ENCOUNTER — APPOINTMENT (OUTPATIENT)
Dept: CARDIOLOGY | Facility: CLINIC | Age: 79
End: 2019-05-20
Payer: MEDICARE

## 2019-05-20 ENCOUNTER — NON-APPOINTMENT (OUTPATIENT)
Age: 79
End: 2019-05-20

## 2019-05-20 VITALS
OXYGEN SATURATION: 95 % | TEMPERATURE: 98.7 F | HEART RATE: 74 BPM | DIASTOLIC BLOOD PRESSURE: 73 MMHG | RESPIRATION RATE: 12 BRPM | SYSTOLIC BLOOD PRESSURE: 176 MMHG | BODY MASS INDEX: 41.42 KG/M2 | HEIGHT: 57 IN | WEIGHT: 192 LBS

## 2019-05-20 VITALS — HEART RATE: 72 BPM | SYSTOLIC BLOOD PRESSURE: 130 MMHG | DIASTOLIC BLOOD PRESSURE: 74 MMHG

## 2019-05-20 PROCEDURE — 93000 ELECTROCARDIOGRAM COMPLETE: CPT

## 2019-05-20 PROCEDURE — 99214 OFFICE O/P EST MOD 30 MIN: CPT

## 2019-05-20 PROCEDURE — 99215 OFFICE O/P EST HI 40 MIN: CPT | Mod: 25

## 2019-05-20 RX ORDER — GABAPENTIN 400 MG/1
1 CAPSULE ORAL
Qty: 0 | Refills: 0 | DISCHARGE

## 2019-05-20 RX ORDER — LINAGLIPTIN 5 MG/1
1 TABLET, FILM COATED ORAL
Qty: 0 | Refills: 0 | DISCHARGE

## 2019-05-20 RX ORDER — FUROSEMIDE 40 MG
3 TABLET ORAL
Qty: 0 | Refills: 0 | DISCHARGE

## 2019-05-20 RX ORDER — SULFAMETHOXAZOLE AND TRIMETHOPRIM 800; 160 MG/1; MG/1
800-160 TABLET ORAL TWICE DAILY
Qty: 14 | Refills: 0 | Status: DISCONTINUED | COMMUNITY
Start: 2019-02-08 | End: 2019-05-20

## 2019-05-20 RX ORDER — CIPROFLOXACIN HYDROCHLORIDE 250 MG/1
250 TABLET, FILM COATED ORAL
Qty: 10 | Refills: 0 | Status: DISCONTINUED | COMMUNITY
Start: 2019-02-25 | End: 2019-05-20

## 2019-05-20 NOTE — COUNSELING
[Fall prevention counseling provided] : fall prevention  [Adequate lighting] : Adequate lighting [No throw rugs] : No throw rugs [Use proper foot wear] : Use proper foot wear [Use recommended devices] : Use recommended devices [None] : None [Good understanding] : Patient has a good understanding of lifestyle changes and the steps needed to achieve self management goals

## 2019-05-21 ENCOUNTER — OUTPATIENT (OUTPATIENT)
Dept: OUTPATIENT SERVICES | Facility: HOSPITAL | Age: 79
LOS: 1 days | End: 2019-05-21
Payer: MEDICARE

## 2019-05-21 ENCOUNTER — NON-APPOINTMENT (OUTPATIENT)
Age: 79
End: 2019-05-21

## 2019-05-21 VITALS
DIASTOLIC BLOOD PRESSURE: 80 MMHG | WEIGHT: 190.04 LBS | RESPIRATION RATE: 14 BRPM | HEART RATE: 73 BPM | OXYGEN SATURATION: 99 % | SYSTOLIC BLOOD PRESSURE: 160 MMHG | TEMPERATURE: 98 F | HEIGHT: 57 IN

## 2019-05-21 DIAGNOSIS — I82.409 ACUTE EMBOLISM AND THROMBOSIS OF UNSPECIFIED DEEP VEINS OF UNSPECIFIED LOWER EXTREMITY: ICD-10-CM

## 2019-05-21 DIAGNOSIS — Z90.11 ACQUIRED ABSENCE OF RIGHT BREAST AND NIPPLE: Chronic | ICD-10-CM

## 2019-05-21 DIAGNOSIS — Z01.818 ENCOUNTER FOR OTHER PREPROCEDURAL EXAMINATION: ICD-10-CM

## 2019-05-21 DIAGNOSIS — Z98.89 OTHER SPECIFIED POSTPROCEDURAL STATES: Chronic | ICD-10-CM

## 2019-05-21 DIAGNOSIS — Z96.651 PRESENCE OF RIGHT ARTIFICIAL KNEE JOINT: Chronic | ICD-10-CM

## 2019-05-21 DIAGNOSIS — E11.9 TYPE 2 DIABETES MELLITUS WITHOUT COMPLICATIONS: ICD-10-CM

## 2019-05-21 DIAGNOSIS — Z93.2 ILEOSTOMY STATUS: Chronic | ICD-10-CM

## 2019-05-21 DIAGNOSIS — Z90.49 ACQUIRED ABSENCE OF OTHER SPECIFIED PARTS OF DIGESTIVE TRACT: Chronic | ICD-10-CM

## 2019-05-21 DIAGNOSIS — M19.90 UNSPECIFIED OSTEOARTHRITIS, UNSPECIFIED SITE: ICD-10-CM

## 2019-05-21 DIAGNOSIS — M16.11 UNILATERAL PRIMARY OSTEOARTHRITIS, RIGHT HIP: ICD-10-CM

## 2019-05-21 LAB
ALBUMIN SERPL ELPH-MCNC: 3.7 G/DL — SIGNIFICANT CHANGE UP (ref 3.3–5)
ALLERGY+IMMUNOLOGY DIAG STUDY NOTE: SIGNIFICANT CHANGE UP
ALP SERPL-CCNC: 67 U/L — SIGNIFICANT CHANGE UP (ref 30–120)
ALT FLD-CCNC: 22 U/L DA — SIGNIFICANT CHANGE UP (ref 10–60)
ANION GAP SERPL CALC-SCNC: 7 MMOL/L — SIGNIFICANT CHANGE UP (ref 5–17)
APTT BLD: 33.3 SEC — SIGNIFICANT CHANGE UP (ref 28.5–37)
AST SERPL-CCNC: 18 U/L — SIGNIFICANT CHANGE UP (ref 10–40)
BILIRUB SERPL-MCNC: 0.4 MG/DL — SIGNIFICANT CHANGE UP (ref 0.2–1.2)
BUN SERPL-MCNC: 44 MG/DL — HIGH (ref 7–23)
CALCIUM SERPL-MCNC: 9.2 MG/DL — SIGNIFICANT CHANGE UP (ref 8.4–10.5)
CHLORIDE SERPL-SCNC: 100 MMOL/L — SIGNIFICANT CHANGE UP (ref 96–108)
CO2 SERPL-SCNC: 32 MMOL/L — HIGH (ref 22–31)
CREAT SERPL-MCNC: 1.87 MG/DL — HIGH (ref 0.5–1.3)
GLUCOSE SERPL-MCNC: 192 MG/DL — HIGH (ref 70–99)
HCT VFR BLD CALC: 41.8 % — SIGNIFICANT CHANGE UP (ref 34.5–45)
HGB BLD-MCNC: 13.1 G/DL — SIGNIFICANT CHANGE UP (ref 11.5–15.5)
INR BLD: 1.06 RATIO — SIGNIFICANT CHANGE UP (ref 0.88–1.16)
MCHC RBC-ENTMCNC: 29.5 PG — SIGNIFICANT CHANGE UP (ref 27–34)
MCHC RBC-ENTMCNC: 31.3 GM/DL — LOW (ref 32–36)
MCV RBC AUTO: 94.1 FL — SIGNIFICANT CHANGE UP (ref 80–100)
NRBC # BLD: 0 /100 WBCS — SIGNIFICANT CHANGE UP (ref 0–0)
PLATELET # BLD AUTO: 279 K/UL — SIGNIFICANT CHANGE UP (ref 150–400)
POTASSIUM SERPL-MCNC: 4 MMOL/L — SIGNIFICANT CHANGE UP (ref 3.5–5.3)
POTASSIUM SERPL-SCNC: 4 MMOL/L — SIGNIFICANT CHANGE UP (ref 3.5–5.3)
PROT SERPL-MCNC: 8.1 G/DL — SIGNIFICANT CHANGE UP (ref 6–8.3)
PROTHROM AB SERPL-ACNC: 11.6 SEC — SIGNIFICANT CHANGE UP (ref 10–12.9)
RBC # BLD: 4.44 M/UL — SIGNIFICANT CHANGE UP (ref 3.8–5.2)
RBC # FLD: 14.5 % — SIGNIFICANT CHANGE UP (ref 10.3–14.5)
SODIUM SERPL-SCNC: 139 MMOL/L — SIGNIFICANT CHANGE UP (ref 135–145)
WBC # BLD: 7.69 K/UL — SIGNIFICANT CHANGE UP (ref 3.8–10.5)
WBC # FLD AUTO: 7.69 K/UL — SIGNIFICANT CHANGE UP (ref 3.8–10.5)

## 2019-05-21 PROCEDURE — 87640 STAPH A DNA AMP PROBE: CPT

## 2019-05-21 PROCEDURE — 36415 COLL VENOUS BLD VENIPUNCTURE: CPT

## 2019-05-21 PROCEDURE — 93005 ELECTROCARDIOGRAM TRACING: CPT

## 2019-05-21 PROCEDURE — G0463: CPT

## 2019-05-21 PROCEDURE — 85027 COMPLETE CBC AUTOMATED: CPT

## 2019-05-21 PROCEDURE — 85730 THROMBOPLASTIN TIME PARTIAL: CPT

## 2019-05-21 PROCEDURE — 85610 PROTHROMBIN TIME: CPT

## 2019-05-21 PROCEDURE — 93010 ELECTROCARDIOGRAM REPORT: CPT

## 2019-05-21 PROCEDURE — 86900 BLOOD TYPING SEROLOGIC ABO: CPT

## 2019-05-21 PROCEDURE — 86901 BLOOD TYPING SEROLOGIC RH(D): CPT

## 2019-05-21 PROCEDURE — 80053 COMPREHEN METABOLIC PANEL: CPT

## 2019-05-21 PROCEDURE — 83036 HEMOGLOBIN GLYCOSYLATED A1C: CPT

## 2019-05-21 PROCEDURE — 87641 MR-STAPH DNA AMP PROBE: CPT

## 2019-05-21 PROCEDURE — 86850 RBC ANTIBODY SCREEN: CPT

## 2019-05-21 RX ORDER — GABAPENTIN 400 MG/1
0 CAPSULE ORAL
Qty: 0 | Refills: 0 | DISCHARGE

## 2019-05-21 NOTE — H&P PST ADULT - NSICDXPASTMEDICALHX_GEN_ALL_CORE_FT
PAST MEDICAL HISTORY:  Breast CA, right 1989 s/p mastectomy ,IV Chemotherapy    CHF (congestive heart failure)     DVT of leg (deep venous thrombosis) right calf DVT  2/2019 pt on Eliquis    HTN (hypertension)     Hyperlipidemia     Lymphedema of arm right arm s/p mastectomy    Obese     Rectal cancer s/p chemotherapy and  radiation 12 weeks 2/2015 -3/2015    Type II diabetes mellitus PAST MEDICAL HISTORY:  Breast CA, right 1989 s/p mastectomy ,IV Chemotherapy    CHF (congestive heart failure)     DVT of leg (deep venous thrombosis) right calf DVT  2/2019 pt on Eliquis    HTN (hypertension)     Hyperlipidemia     Lymphedema of arm right arm s/p mastectomy    Obese     Rectal cancer s/p chemotherapy and  radiation 12 weeks 2/2015 -3/2015    Renal insufficiency     Type II diabetes mellitus

## 2019-05-21 NOTE — H&P PST ADULT - NSICDXPROBLEM_GEN_ALL_CORE_FT
PROBLEM DIAGNOSES  Problem: Diabetes mellitus  Assessment and Plan: POCT on admit   hypoglycemia consult     Problem: Deep vein thrombosis (DVT)  Assessment and Plan: vascular clearance   Follow up with vascular with Eliquis  medication instruction for upcoming surgery. recommended to stop Eliquis atleast 4 days before surgery due to elevated creatinine 2.2     Problem: Osteoarthritis  Assessment and Plan: Right hip replacement   Medical and cardiac clearance   Needt address EKG in the clearance   scheduled for stress and Echo on 5/22 /19  Pharmacy and anesthhesia coonsult

## 2019-05-21 NOTE — H&P PST ADULT - HISTORY OF PRESENT ILLNESS
This is a 78 y/o female with 3 year history ogf debilitating right hip /groin pain, inabilty to put pressure walk  unable to perform her ADLs . she has been experiencing severe pain 10/10  for the past 6 months and received cortisone injection with mild relief .Patient ambulates with walker .Oxycodone PRN with mild relief  scheduled for right hip replacement This is a 78 y/o Syriac speaking female with 3 year history of debilitating right hip /groin pain, inability to put pressure and walk , unable to perform her ADLs . she has been experiencing severe pain 10/10  for the past 6 months and received cortisone injection with mild relief .Patient ambulates with walker .Oxycodone PRN with mild relief  scheduled for right hip replacement

## 2019-05-21 NOTE — H&P PST ADULT - VENOUS THROMBOEMBOLISM CURRENT STATUS
(2) malignancy (present or previous)/(1) congestive heart failure (within 1 month)/(1) other risk factor (includes escalating BMI, pack-years of smoking, diabetes requiring insulin, chemotherapy, female gender and length of surgery)/(1) swollen legs (current)

## 2019-05-21 NOTE — H&P PST ADULT - ASSESSMENT
Pt. is a 76 yo Irish speaking female accompanied by her daughter.  Pt. refused  services.  Pt's. daughter provided translation.  Pt. has an ileostomy. 78 y/o female with right hip pain /OA , right leg DVT, CHF, HTN ,

## 2019-05-21 NOTE — H&P PST ADULT - NSANTHOSAYNRD_GEN_A_CORE
No. JAVIER screening performed.  STOP BANG Legend: 0-2 = LOW Risk; 3-4 = INTERMEDIATE Risk; 5-8 = HIGH Risk

## 2019-05-21 NOTE — H&P PST ADULT - NSICDXFAMILYHX_GEN_ALL_CORE_FT
FAMILY HISTORY:  Family history of diabetes mellitus in mother  Family history of diabetes mellitus in son

## 2019-05-21 NOTE — H&P PST ADULT - EXTREMITIES COMMENTS
bilateral LE 1 + edema , No calf tenderness .Recent doppler studies done last month , No DVT cleared by vascular  right arm lymphedema

## 2019-05-21 NOTE — HISTORY OF PRESENT ILLNESS
[FreeTextEntry1] : Patient is an 79 year old woman with a history of HTN, hyperlipidemia, type 2 diabetes mellitus, breast cancer status post right partial mastectomy, rectal carcinoma status post resection, anemia, hypokalemia, diastolic heart failure, right lower extremity DVT, and renal insufficiency who presents today for follow up of HTN and risk stratification prior to right hip replacement. She states that her lower extremity edema has been stable. She otherwise denies any chest pain, palpitations, dyspnea at rest, headaches, dizziness, or palpitations. Her right upper extremity swelling due to chronic lymphedema and dyspnea with exertion are unchanged.

## 2019-05-21 NOTE — PHYSICAL EXAM
[General Appearance - Well Developed] : well developed [Normal Appearance] : normal appearance [Well Groomed] : well groomed [General Appearance - Well Nourished] : well nourished [No Deformities] : no deformities [General Appearance - In No Acute Distress] : no acute distress [Normal Conjunctiva] : the conjunctiva exhibited no abnormalities [Normal Oral Mucosa] : normal oral mucosa [No Oral Pallor] : no oral pallor [No Oral Cyanosis] : no oral cyanosis [Normal Jugular Venous A Waves Present] : normal jugular venous A waves present [Normal Jugular Venous V Waves Present] : normal jugular venous V waves present [Respiration, Rhythm And Depth] : normal respiratory rhythm and effort [Exaggerated Use Of Accessory Muscles For Inspiration] : no accessory muscle use [Auscultation Breath Sounds / Voice Sounds] : lungs were clear to auscultation bilaterally [Bowel Sounds] : normal bowel sounds [Abdomen Soft] : soft [Abdomen Tenderness] : non-tender [Nail Clubbing] : no clubbing of the fingernails [Cyanosis, Localized] : no localized cyanosis [Petechial Hemorrhages (___cm)] : no petechial hemorrhages [Skin Color & Pigmentation] : normal skin color and pigmentation [] : no rash [No Skin Ulcers] : no skin ulcer [Oriented To Time, Place, And Person] : oriented to person, place, and time [Affect] : the affect was normal [Mood] : the mood was normal [No Anxiety] : not feeling anxious [Normal Rate] : normal [Rhythm Regular] : regular [Normal S1] : normal S1 [Normal S2] : normal S2 [II] : a grade 2 [___ +] : bilateral [unfilled]U+ pretibial pitting edema [FreeTextEntry1] : Her gait is slow. [Right Carotid Bruit] : no bruit heard over the right carotid [Left Carotid Bruit] : no bruit heard over the left carotid [Bruit] : no bruit heard

## 2019-05-21 NOTE — DISCUSSION/SUMMARY
[FreeTextEntry1] : IMPRESSION: Ms. Batista is an 79 year old woman with a history of HTN, hyperlipidemia, type 2 diabetes mellitus, breast cancer status post right partial mastectomy, rectal carcinoma status post resection, diastolic heart failure, anemia, hypokalemia, and renal insufficiency who presents today for follow up of HTN and risk stratification prior to right hip replacement \par \par PLAN:\par 1. Her lower extremity edema is stable, thus she will continue on Bumex 1mg BID and Spironolactone 12.5mg daily. She will continue on the Eliquis 2.5 mg twice daily for her DVT as prescribed by Vascular and she will continue to follow up with Vascular Cardiology. We will need to follow her potassium and creatinine on her current diuretic regimen.\par 2. Her blood pressure was initially elevated, however, improved when it was checked at the time of her physical examination. She will continue to watch her diet and will also continue on Isosorbide 60mg daily, Hydralazine 100 mg twice daily, Bumex, Spironolactone, and Metoprolol ER 25 mg daily.  \par 3. Her LDL was good on her most recent lipid profile, thus she will continue on Atorvastatin 20mg every other day along with a low cholesterol diet. \par 4. I have asked her to schedule a pharmacologic nuclear stress test given dyspnea with exertion prior to her hip replacement.\par 5. She will follow up with me after her stress test should there be any significant ischemia.

## 2019-05-21 NOTE — H&P PST ADULT - NSICDXPASTSURGICALHX_GEN_ALL_CORE_FT
PAST SURGICAL HISTORY:  History of appendectomy 1953    S/P colon resection 2015    S/P ileostomy low anterior resection-8/10/15, reversal of ileostomy 2016    S/P mastectomy, right 1989    S/P TKR (total knee replacement), right 2017

## 2019-05-21 NOTE — H&P PST ADULT - MUSCULOSKELETAL
detailed exam decreased ROM/joint swelling/decreased ROM due to pain details… decreased ROM/diminished strength/decreased ROM due to pain/joint swelling/no calf tenderness

## 2019-05-22 ENCOUNTER — OUTPATIENT (OUTPATIENT)
Dept: OUTPATIENT SERVICES | Facility: HOSPITAL | Age: 79
LOS: 1 days | End: 2019-05-22
Payer: MEDICARE

## 2019-05-22 ENCOUNTER — APPOINTMENT (OUTPATIENT)
Dept: CV DIAGNOSTICS | Facility: HOSPITAL | Age: 79
End: 2019-05-22

## 2019-05-22 DIAGNOSIS — Z90.49 ACQUIRED ABSENCE OF OTHER SPECIFIED PARTS OF DIGESTIVE TRACT: Chronic | ICD-10-CM

## 2019-05-22 DIAGNOSIS — Z96.651 PRESENCE OF RIGHT ARTIFICIAL KNEE JOINT: Chronic | ICD-10-CM

## 2019-05-22 DIAGNOSIS — R06.00 DYSPNEA, UNSPECIFIED: ICD-10-CM

## 2019-05-22 DIAGNOSIS — Z90.11 ACQUIRED ABSENCE OF RIGHT BREAST AND NIPPLE: Chronic | ICD-10-CM

## 2019-05-22 DIAGNOSIS — R94.39 ABNORMAL RESULT OF OTHER CARDIOVASCULAR FUNCTION STUDY: ICD-10-CM

## 2019-05-22 DIAGNOSIS — Z98.89 OTHER SPECIFIED POSTPROCEDURAL STATES: Chronic | ICD-10-CM

## 2019-05-22 DIAGNOSIS — Z93.2 ILEOSTOMY STATUS: Chronic | ICD-10-CM

## 2019-05-22 LAB
HBA1C BLD-MCNC: 6.7 % — HIGH (ref 4–5.6)
MRSA PCR RESULT.: DETECTED
S AUREUS DNA NOSE QL NAA+PROBE: DETECTED

## 2019-05-22 PROCEDURE — 78452 HT MUSCLE IMAGE SPECT MULT: CPT | Mod: 26

## 2019-05-22 PROCEDURE — 93018 CV STRESS TEST I&R ONLY: CPT

## 2019-05-22 PROCEDURE — A9505: CPT

## 2019-05-22 PROCEDURE — 93017 CV STRESS TEST TRACING ONLY: CPT

## 2019-05-22 PROCEDURE — A9500: CPT

## 2019-05-22 PROCEDURE — 93016 CV STRESS TEST SUPVJ ONLY: CPT

## 2019-05-22 PROCEDURE — 78452 HT MUSCLE IMAGE SPECT MULT: CPT

## 2019-05-23 ENCOUNTER — RX CHANGE (OUTPATIENT)
Age: 79
End: 2019-05-23

## 2019-05-23 PROBLEM — R94.39 ABNORMAL NUCLEAR STRESS TEST: Status: ACTIVE | Noted: 2019-05-23

## 2019-05-23 LAB
CHOLEST SERPL-MCNC: 165 MG/DL
CHOLEST/HDLC SERPL: 3 RATIO
HDLC SERPL-MCNC: 55 MG/DL
LDLC SERPL CALC-MCNC: 77 MG/DL
TRIGL SERPL-MCNC: 164 MG/DL

## 2019-05-23 RX ORDER — MUPIROCIN 20 MG/G
1 OINTMENT TOPICAL
Qty: 1 | Refills: 0
Start: 2019-05-23 | End: 2019-05-27

## 2019-05-23 NOTE — RESULTS/DATA
[] : not indicated [ECG Reviewed] : reviewed [No Acute Ischemia] : No acute ischemia [NSR] : normal sinus rhythm [No Interval Change] : no interval change [de-identified] : BUN/creatinine , 44/1.87, nonfasting glucose 192, hemoglobin A1c 6.7. [FreeTextEntry2] : Anteroseptal infarct

## 2019-05-23 NOTE — ASSESSMENT
[Patient NOT optimized for Surgery at this time] : Patient not optimized for surgery at this time [Stress Test] : stress test [Cardiology consultation] : Cardiology consultation [Modify medications prior to procedure] : Modify medications prior to procedure [As per surgery] : as per surgery [FreeTextEntry7] : NO CHANGES UNTIL DAYS BEFORE SX

## 2019-05-23 NOTE — REVIEW OF SYSTEMS
[Joint Pain] : joint pain [Joint Stiffness] : joint stiffness [Back Pain] : back pain [Unsteady Walking] : ataxia [Negative] : Heme/Lymph [Joint Swelling] : no joint swelling [Muscle Pain] : no muscle pain [Dizziness] : no dizziness [Fainting] : no fainting [Confusion] : no confusion [Memory Loss] : no memory loss

## 2019-05-23 NOTE — HISTORY OF PRESENT ILLNESS
[No Pertinent Cardiac History] : no history of aortic stenosis, atrial fibrillation, coronary artery disease, recent myocardial infarction, or implantable device/pacemaker [No Pertinent Pulmonary History] : no history of asthma, COPD, sleep apnea, or smoking [No Adverse Anesthesia Reaction] : no adverse anesthesia reaction in self or family member [Chronic Anticoagulation] : chronic anticoagulation [Chronic Kidney Disease] : chronic kidney disease [Diabetes] : diabetes [(Patient denies any chest pain, claudication, dyspnea on exertion, orthopnea, palpitations or syncope)] : Patient denies any chest pain, claudication, dyspnea on exertion, orthopnea, palpitations or syncope [FreeTextEntry1] : RIGHT TOTAL HIP REPLACEMENT [FreeTextEntry2] : 05/31/19. [FreeTextEntry3] : DR CINDY GUZMÁN

## 2019-05-23 NOTE — PHYSICAL EXAM
[No Acute Distress] : no acute distress [Well Nourished] : well nourished [Well Developed] : well developed [Well-Appearing] : well-appearing [Normal Sclera/Conjunctiva] : normal sclera/conjunctiva [PERRL] : pupils equal round and reactive to light [EOMI] : extraocular movements intact [Normal Outer Ear/Nose] : the outer ears and nose were normal in appearance [Normal Oropharynx] : the oropharynx was normal [No JVD] : no jugular venous distention [Supple] : supple [No Lymphadenopathy] : no lymphadenopathy [Thyroid Normal, No Nodules] : the thyroid was normal and there were no nodules present [No Respiratory Distress] : no respiratory distress  [Clear to Auscultation] : lungs were clear to auscultation bilaterally [No Accessory Muscle Use] : no accessory muscle use [Normal Rate] : normal rate  [Regular Rhythm] : with a regular rhythm [Normal S1, S2] : normal S1 and S2 [No Murmur] : no murmur heard [No Carotid Bruits] : no carotid bruits [No Abdominal Bruit] : a ~M bruit was not heard ~T in the abdomen [No Varicosities] : no varicosities [Pedal Pulses Present] : the pedal pulses are present [No Edema] : there was no peripheral edema [No Extremity Clubbing/Cyanosis] : no extremity clubbing/cyanosis [No Palpable Aorta] : no palpable aorta [Soft] : abdomen soft [Non Tender] : non-tender [Non-distended] : non-distended [No Masses] : no abdominal mass palpated [No HSM] : no HSM [Normal Bowel Sounds] : normal bowel sounds [Normal Posterior Cervical Nodes] : no posterior cervical lymphadenopathy [Normal Anterior Cervical Nodes] : no anterior cervical lymphadenopathy [No CVA Tenderness] : no CVA  tenderness [No Spinal Tenderness] : no spinal tenderness [No Joint Swelling] : no joint swelling [No Rash] : no rash [No Focal Deficits] : no focal deficits [Normal Affect] : the affect was normal [Normal Insight/Judgement] : insight and judgment were intact [Alert and Oriented x3] : oriented to person, place, and time [de-identified] : NEEDS HELP WITH A WALKER TO ASSIST WITH GAIT.

## 2019-05-24 ENCOUNTER — OTHER (OUTPATIENT)
Age: 79
End: 2019-05-24

## 2019-05-28 PROBLEM — I82.409 ACUTE EMBOLISM AND THROMBOSIS OF UNSPECIFIED DEEP VEINS OF UNSPECIFIED LOWER EXTREMITY: Chronic | Status: ACTIVE | Noted: 2019-05-21

## 2019-05-28 PROBLEM — N28.9 DISORDER OF KIDNEY AND URETER, UNSPECIFIED: Chronic | Status: ACTIVE | Noted: 2019-05-21

## 2019-05-31 ENCOUNTER — APPOINTMENT (OUTPATIENT)
Dept: ORTHOPEDIC SURGERY | Facility: HOSPITAL | Age: 79
End: 2019-05-31

## 2019-05-31 ENCOUNTER — OUTPATIENT (OUTPATIENT)
Dept: OUTPATIENT SERVICES | Facility: HOSPITAL | Age: 79
LOS: 1 days | End: 2019-05-31
Payer: MEDICARE

## 2019-05-31 DIAGNOSIS — Z90.49 ACQUIRED ABSENCE OF OTHER SPECIFIED PARTS OF DIGESTIVE TRACT: Chronic | ICD-10-CM

## 2019-05-31 DIAGNOSIS — Z93.2 ILEOSTOMY STATUS: Chronic | ICD-10-CM

## 2019-05-31 DIAGNOSIS — Z96.651 PRESENCE OF RIGHT ARTIFICIAL KNEE JOINT: Chronic | ICD-10-CM

## 2019-05-31 DIAGNOSIS — Z90.11 ACQUIRED ABSENCE OF RIGHT BREAST AND NIPPLE: Chronic | ICD-10-CM

## 2019-05-31 DIAGNOSIS — Z98.89 OTHER SPECIFIED POSTPROCEDURAL STATES: Chronic | ICD-10-CM

## 2019-05-31 PROCEDURE — 80053 COMPREHEN METABOLIC PANEL: CPT

## 2019-05-31 PROCEDURE — 82962 GLUCOSE BLOOD TEST: CPT

## 2019-05-31 PROCEDURE — 93005 ELECTROCARDIOGRAM TRACING: CPT

## 2019-05-31 PROCEDURE — 85027 COMPLETE CBC AUTOMATED: CPT

## 2019-06-03 ENCOUNTER — APPOINTMENT (OUTPATIENT)
Dept: CARDIOLOGY | Facility: CLINIC | Age: 79
End: 2019-06-03

## 2019-06-12 ENCOUNTER — APPOINTMENT (OUTPATIENT)
Dept: INTERNAL MEDICINE | Facility: CLINIC | Age: 79
End: 2019-06-12
Payer: MEDICARE

## 2019-06-12 VITALS
BODY MASS INDEX: 41.42 KG/M2 | WEIGHT: 192 LBS | SYSTOLIC BLOOD PRESSURE: 129 MMHG | RESPIRATION RATE: 12 BRPM | HEART RATE: 78 BPM | DIASTOLIC BLOOD PRESSURE: 71 MMHG | TEMPERATURE: 98.7 F | HEIGHT: 57 IN | OXYGEN SATURATION: 98 %

## 2019-06-12 PROCEDURE — 99215 OFFICE O/P EST HI 40 MIN: CPT

## 2019-06-12 NOTE — COUNSELING
[Fall prevention counseling provided] : fall prevention  [Adequate lighting] : Adequate lighting [Use proper foot wear] : Use proper foot wear [Use recommended devices] : Use recommended devices

## 2019-06-12 NOTE — PHYSICAL EXAM
[Well Developed] : well developed [Well Nourished] : well nourished [No Acute Distress] : no acute distress [Well-Appearing] : well-appearing [Normal Sclera/Conjunctiva] : normal sclera/conjunctiva [PERRL] : pupils equal round and reactive to light [EOMI] : extraocular movements intact [Normal Outer Ear/Nose] : the outer ears and nose were normal in appearance [Normal Oropharynx] : the oropharynx was normal [No JVD] : no jugular venous distention [Supple] : supple [No Lymphadenopathy] : no lymphadenopathy [Thyroid Normal, No Nodules] : the thyroid was normal and there were no nodules present [No Respiratory Distress] : no respiratory distress  [Clear to Auscultation] : lungs were clear to auscultation bilaterally [Normal Rate] : normal rate  [No Accessory Muscle Use] : no accessory muscle use [Normal S1, S2] : normal S1 and S2 [No Murmur] : no murmur heard [Regular Rhythm] : with a regular rhythm [No Varicosities] : no varicosities [No Abdominal Bruit] : a ~M bruit was not heard ~T in the abdomen [No Carotid Bruits] : no carotid bruits [Pedal Pulses Present] : the pedal pulses are present [No Edema] : there was no peripheral edema [No Palpable Aorta] : no palpable aorta [No Extremity Clubbing/Cyanosis] : no extremity clubbing/cyanosis [Soft] : abdomen soft [No Masses] : no abdominal mass palpated [Non-distended] : non-distended [Non Tender] : non-tender [No HSM] : no HSM [Normal Bowel Sounds] : normal bowel sounds [Normal Posterior Cervical Nodes] : no posterior cervical lymphadenopathy [No Spinal Tenderness] : no spinal tenderness [No CVA Tenderness] : no CVA  tenderness [Normal Anterior Cervical Nodes] : no anterior cervical lymphadenopathy [No Joint Swelling] : no joint swelling [No Rash] : no rash [No Focal Deficits] : no focal deficits [Normal Affect] : the affect was normal [Alert and Oriented x3] : oriented to person, place, and time [Normal Insight/Judgement] : insight and judgment were intact [de-identified] : TENDER , STIFF RIGHT HIP WITH ANY ATTEMPS OF ROM. [de-identified] : UNSTEADY

## 2019-06-12 NOTE — REVIEW OF SYSTEMS
[Joint Pain] : joint pain [Joint Stiffness] : joint stiffness [Back Pain] : back pain [Unsteady Walking] : ataxia [Negative] : Psychiatric [Joint Swelling] : no joint swelling [Headache] : no headache [Muscle Weakness] : no muscle weakness [Muscle Pain] : no muscle pain [Fainting] : no fainting [Dizziness] : no dizziness [Confusion] : no confusion [Memory Loss] : no memory loss [de-identified] : NEEDS ASSISTANCE WITH A PERSON, WALKER. [FreeTextEntry9] : right hip

## 2019-06-13 LAB — MAGNESIUM SERPL-MCNC: 2.1 MG/DL

## 2019-06-17 LAB
ANION GAP SERPL CALC-SCNC: 18 MMOL/L
BUN SERPL-MCNC: 44 MG/DL
CALCIUM SERPL-MCNC: 9.9 MG/DL
CHLORIDE SERPL-SCNC: 96 MMOL/L
CO2 SERPL-SCNC: 26 MMOL/L
CREAT SERPL-MCNC: 1.84 MG/DL
GLUCOSE SERPL-MCNC: 69 MG/DL
POTASSIUM SERPL-SCNC: 3.9 MMOL/L
SODIUM SERPL-SCNC: 140 MMOL/L

## 2019-06-19 ENCOUNTER — RX RENEWAL (OUTPATIENT)
Age: 79
End: 2019-06-19

## 2019-06-26 ENCOUNTER — MEDICATION RENEWAL (OUTPATIENT)
Age: 79
End: 2019-06-26

## 2019-06-27 ENCOUNTER — RX RENEWAL (OUTPATIENT)
Age: 79
End: 2019-06-27

## 2019-07-18 ENCOUNTER — RX RENEWAL (OUTPATIENT)
Age: 79
End: 2019-07-18

## 2019-07-18 DIAGNOSIS — I25.10 ATHEROSCLEROTIC HEART DISEASE OF NATIVE CORONARY ARTERY WITHOUT ANGINA PECTORIS: ICD-10-CM

## 2019-07-21 ENCOUNTER — RX RENEWAL (OUTPATIENT)
Age: 79
End: 2019-07-21

## 2019-07-23 ENCOUNTER — RX RENEWAL (OUTPATIENT)
Age: 79
End: 2019-07-23

## 2019-07-24 ENCOUNTER — RX CHANGE (OUTPATIENT)
Age: 79
End: 2019-07-24

## 2019-07-24 ENCOUNTER — RX RENEWAL (OUTPATIENT)
Age: 79
End: 2019-07-24

## 2019-08-09 ENCOUNTER — MED ADMIN CHARGE (OUTPATIENT)
Age: 79
End: 2019-08-09

## 2019-08-14 ENCOUNTER — RX RENEWAL (OUTPATIENT)
Age: 79
End: 2019-08-14

## 2019-08-16 ENCOUNTER — APPOINTMENT (OUTPATIENT)
Dept: CARDIOLOGY | Facility: CLINIC | Age: 79
End: 2019-08-16
Payer: MEDICARE

## 2019-08-16 ENCOUNTER — NON-APPOINTMENT (OUTPATIENT)
Age: 79
End: 2019-08-16

## 2019-08-16 VITALS
BODY MASS INDEX: 41.21 KG/M2 | WEIGHT: 191 LBS | HEIGHT: 57 IN | DIASTOLIC BLOOD PRESSURE: 69 MMHG | SYSTOLIC BLOOD PRESSURE: 147 MMHG | RESPIRATION RATE: 14 BRPM | OXYGEN SATURATION: 95 % | HEART RATE: 69 BPM

## 2019-08-16 PROCEDURE — 99215 OFFICE O/P EST HI 40 MIN: CPT | Mod: 25

## 2019-08-16 PROCEDURE — 93000 ELECTROCARDIOGRAM COMPLETE: CPT

## 2019-08-16 NOTE — DISCUSSION/SUMMARY
[FreeTextEntry1] : IMPRESSION: Ms. Batista is an 79 year old woman with a history of HTN, hyperlipidemia, type 2 diabetes mellitus, breast cancer status post right partial mastectomy, rectal carcinoma status post resection, diastolic heart failure, anemia, hypokalemia, and renal insufficiency who presents today for follow up of HTN and abnormal nuclear stress test.\par \par PLAN:\par 1. Her lower extremity edema is stable, thus she will continue on Bumex 1mg BID and Spironolactone 12.5mg daily. She will continue on the Eliquis 2.5 mg twice daily for her DVT as prescribed by Vascular and she will continue to follow up with Vascular Cardiology. We will need to follow her potassium and creatinine on her current diuretic regimen, particularly given her renal insufficiency.\par 2. Her blood pressure is mildly elevated, however, acceptable at this time given that she feels well and her lower extremity edema is stable. She will continue to watch her diet and will also continue on Isosorbide 60mg daily, Hydralazine 100 mg twice daily, Bumex, Spironolactone, and Metoprolol ER 25 mg daily.  \par 3. Her LDL was good on her most recent lipid profile with mildly elevated triglycerides, thus she will continue on Atorvastatin 10mg daily along with a low cholesterol diet. \par 4. Given her abnormal nuclear stress test with ischemia, I have suggested that she should have a left heart cath prior to hip surgery. Should she decide against surgery, we can consider medical management at this time given that her heart failure symptoms have been stable. She understands the risks and benefits associated with the procedure, particularly given her renal insufficiency, which was also discussed with her by her nephrologist. Her daughter will speak with her as well as with the patient's son and will notify me as to the route that they wish to proceed with. The patient at this time is pretty sure that she wants to proceed with hip replacement and therefore with a left heart cath, however, they will notify me next week of their final decision. Should she decide to proceed with the cath, she will hold her diuretics for 2 days prior to the procedure and also get IVF for 6 hours at the time of her cath, based on the recommendations of her Nephrologist.\par 5. She will follow up with me either after her cath or in 2-3 months.

## 2019-08-16 NOTE — HISTORY OF PRESENT ILLNESS
[FreeTextEntry1] : Patient is an 79 year old woman with a history of HTN, hyperlipidemia, type 2 diabetes mellitus, breast cancer status post right partial mastectomy, rectal carcinoma status post resection, anemia, hypokalemia, diastolic heart failure, right lower extremity DVT, and renal insufficiency who presents today for follow up of HTN and abnormal stress test. She mentions experiencing joint pain from her hip. She states that her lower extremity edema has been stable. She otherwise denies any chest pain, palpitations, dyspnea at rest, headaches, dizziness, or palpitations. Her right upper extremity swelling due to chronic lymphedema and dyspnea with exertion are unchanged.

## 2019-08-16 NOTE — PHYSICAL EXAM
[Normal Appearance] : normal appearance [General Appearance - Well Developed] : well developed [General Appearance - Well Nourished] : well nourished [Well Groomed] : well groomed [No Deformities] : no deformities [General Appearance - In No Acute Distress] : no acute distress [Normal Conjunctiva] : the conjunctiva exhibited no abnormalities [Normal Oral Mucosa] : normal oral mucosa [No Oral Pallor] : no oral pallor [No Oral Cyanosis] : no oral cyanosis [Normal Jugular Venous A Waves Present] : normal jugular venous A waves present [Normal Jugular Venous V Waves Present] : normal jugular venous V waves present [Respiration, Rhythm And Depth] : normal respiratory rhythm and effort [Exaggerated Use Of Accessory Muscles For Inspiration] : no accessory muscle use [Auscultation Breath Sounds / Voice Sounds] : lungs were clear to auscultation bilaterally [Bowel Sounds] : normal bowel sounds [Abdomen Soft] : soft [Abdomen Tenderness] : non-tender [Nail Clubbing] : no clubbing of the fingernails [Cyanosis, Localized] : no localized cyanosis [Petechial Hemorrhages (___cm)] : no petechial hemorrhages [Skin Color & Pigmentation] : normal skin color and pigmentation [] : no rash [No Skin Ulcers] : no skin ulcer [Oriented To Time, Place, And Person] : oriented to person, place, and time [Affect] : the affect was normal [Mood] : the mood was normal [No Anxiety] : not feeling anxious [Normal Rate] : normal [Rhythm Regular] : regular [Normal S1] : normal S1 [Normal S2] : normal S2 [II] : a grade 2 [___ +] : bilateral [unfilled]U+ pretibial pitting edema [FreeTextEntry1] : Her gait is slow. [Right Carotid Bruit] : no bruit heard over the right carotid [Left Carotid Bruit] : no bruit heard over the left carotid [Bruit] : no bruit heard

## 2019-08-28 ENCOUNTER — APPOINTMENT (OUTPATIENT)
Dept: CARDIOLOGY | Facility: CLINIC | Age: 79
End: 2019-08-28

## 2019-08-28 ENCOUNTER — RX RENEWAL (OUTPATIENT)
Age: 79
End: 2019-08-28

## 2019-08-30 ENCOUNTER — RX RENEWAL (OUTPATIENT)
Age: 79
End: 2019-08-30

## 2019-09-05 ENCOUNTER — LABORATORY RESULT (OUTPATIENT)
Age: 79
End: 2019-09-05

## 2019-09-05 ENCOUNTER — APPOINTMENT (OUTPATIENT)
Dept: INTERNAL MEDICINE | Facility: CLINIC | Age: 79
End: 2019-09-05
Payer: MEDICARE

## 2019-09-05 VITALS
WEIGHT: 192 LBS | TEMPERATURE: 98.2 F | OXYGEN SATURATION: 95 % | HEIGHT: 57 IN | HEART RATE: 70 BPM | BODY MASS INDEX: 41.42 KG/M2 | RESPIRATION RATE: 14 BRPM | SYSTOLIC BLOOD PRESSURE: 116 MMHG | DIASTOLIC BLOOD PRESSURE: 67 MMHG

## 2019-09-05 DIAGNOSIS — M25.551 PAIN IN RIGHT HIP: ICD-10-CM

## 2019-09-05 PROCEDURE — 99214 OFFICE O/P EST MOD 30 MIN: CPT

## 2019-09-05 RX ORDER — HYDRALAZINE HYDROCHLORIDE 100 MG/1
100 TABLET ORAL
Refills: 0 | Status: DISCONTINUED | COMMUNITY
End: 2019-09-05

## 2019-09-05 RX ORDER — DICLOFENAC SODIUM 10 MG/G
1 GEL TOPICAL
Qty: 100 | Refills: 0 | Status: DISCONTINUED | COMMUNITY
Start: 2018-02-15 | End: 2019-09-05

## 2019-09-05 RX ORDER — GABAPENTIN 300 MG/1
300 CAPSULE ORAL
Refills: 0 | Status: DISCONTINUED | COMMUNITY
End: 2019-09-05

## 2019-09-05 RX ORDER — METOPROLOL SUCCINATE 25 MG/1
25 TABLET, EXTENDED RELEASE ORAL
Refills: 0 | Status: DISCONTINUED | COMMUNITY
End: 2019-09-05

## 2019-09-05 RX ORDER — ISOSORBIDE MONONITRATE 60 MG/1
60 TABLET, EXTENDED RELEASE ORAL
Refills: 0 | Status: DISCONTINUED | COMMUNITY
End: 2019-09-05

## 2019-09-05 RX ORDER — PREGABALIN 50 MG/1
50 CAPSULE ORAL
Qty: 42 | Refills: 0 | Status: DISCONTINUED | COMMUNITY
Start: 2019-04-19 | End: 2019-09-05

## 2019-09-05 RX ORDER — APIXABAN 2.5 MG/1
2.5 TABLET, FILM COATED ORAL
Qty: 180 | Refills: 3 | Status: DISCONTINUED | COMMUNITY
Start: 2019-08-14 | End: 2019-09-05

## 2019-09-05 RX ORDER — ATORVASTATIN CALCIUM 20 MG/1
20 TABLET, FILM COATED ORAL
Refills: 0 | Status: DISCONTINUED | COMMUNITY
End: 2019-09-05

## 2019-09-05 RX ORDER — ISOSORBIDE MONONITRATE 30 MG
TABLET, EXTENDED RELEASE 24 HR ORAL
Refills: 0 | Status: DISCONTINUED | COMMUNITY
End: 2019-09-05

## 2019-09-05 RX ORDER — SPIRONOLACTONE 25 MG/1
25 TABLET ORAL
Refills: 0 | Status: DISCONTINUED | COMMUNITY
End: 2019-09-05

## 2019-09-05 RX ORDER — GABAPENTIN 100 MG/1
100 CAPSULE ORAL
Qty: 90 | Refills: 0 | Status: DISCONTINUED | COMMUNITY
Start: 2019-04-18 | End: 2019-09-05

## 2019-09-05 RX ORDER — SITAGLIPTIN 25 MG/1
25 TABLET, FILM COATED ORAL
Refills: 0 | Status: DISCONTINUED | COMMUNITY
End: 2019-09-05

## 2019-09-05 RX ORDER — GLIMEPIRIDE 4 MG/1
4 TABLET ORAL
Refills: 0 | Status: DISCONTINUED | COMMUNITY
End: 2019-09-05

## 2019-09-05 RX ORDER — LIDOCAINE 5% 700 MG/1
5 PATCH TOPICAL
Qty: 30 | Refills: 0 | Status: DISCONTINUED | COMMUNITY
Start: 2018-02-22 | End: 2019-09-05

## 2019-09-05 NOTE — PHYSICAL EXAM
[No Acute Distress] : no acute distress [Well Nourished] : well nourished [Well Developed] : well developed [Well-Appearing] : well-appearing [Normal Sclera/Conjunctiva] : normal sclera/conjunctiva [PERRL] : pupils equal round and reactive to light [EOMI] : extraocular movements intact [Normal Outer Ear/Nose] : the outer ears and nose were normal in appearance [Normal Oropharynx] : the oropharynx was normal [No JVD] : no jugular venous distention [No Lymphadenopathy] : no lymphadenopathy [Supple] : supple [Thyroid Normal, No Nodules] : the thyroid was normal and there were no nodules present [No Respiratory Distress] : no respiratory distress  [No Accessory Muscle Use] : no accessory muscle use [Clear to Auscultation] : lungs were clear to auscultation bilaterally [Normal Rate] : normal rate  [Regular Rhythm] : with a regular rhythm [Normal S1, S2] : normal S1 and S2 [No Murmur] : no murmur heard [No Carotid Bruits] : no carotid bruits [No Abdominal Bruit] : a ~M bruit was not heard ~T in the abdomen [No Varicosities] : no varicosities [Pedal Pulses Present] : the pedal pulses are present [No Edema] : there was no peripheral edema [No Palpable Aorta] : no palpable aorta [No Extremity Clubbing/Cyanosis] : no extremity clubbing/cyanosis [Non Tender] : non-tender [Soft] : abdomen soft [Non-distended] : non-distended [No Masses] : no abdominal mass palpated [No HSM] : no HSM [Normal Bowel Sounds] : normal bowel sounds [Normal Posterior Cervical Nodes] : no posterior cervical lymphadenopathy [Normal Anterior Cervical Nodes] : no anterior cervical lymphadenopathy [No Spinal Tenderness] : no spinal tenderness [No CVA Tenderness] : no CVA  tenderness [No Joint Swelling] : no joint swelling [No Rash] : no rash [Grossly Normal Strength/Tone] : grossly normal strength/tone [Coordination Grossly Intact] : coordination grossly intact [No Focal Deficits] : no focal deficits [Normal Affect] : the affect was normal [Alert and Oriented x3] : oriented to person, place, and time [Normal Insight/Judgement] : insight and judgment were intact [de-identified] : NEEDS  WALKER TO ASSIST

## 2019-09-06 LAB
25(OH)D3 SERPL-MCNC: 49.7 NG/ML
ALBUMIN SERPL ELPH-MCNC: 4.6 G/DL
ALP BLD-CCNC: 60 U/L
ALT SERPL-CCNC: 13 U/L
ANION GAP SERPL CALC-SCNC: 18 MMOL/L
APPEARANCE: CLEAR
AST SERPL-CCNC: 20 U/L
BILIRUB SERPL-MCNC: 0.3 MG/DL
BILIRUBIN URINE: NEGATIVE
BLOOD URINE: NEGATIVE
BUN SERPL-MCNC: 40 MG/DL
CALCIUM SERPL-MCNC: 10 MG/DL
CHLORIDE SERPL-SCNC: 96 MMOL/L
CHOLEST SERPL-MCNC: 176 MG/DL
CHOLEST/HDLC SERPL: 3.1 RATIO
CO2 SERPL-SCNC: 27 MMOL/L
COLOR: NORMAL
CREAT SERPL-MCNC: 1.92 MG/DL
CREAT SPEC-SCNC: 31 MG/DL
ESTIMATED AVERAGE GLUCOSE: 140 MG/DL
GLUCOSE QUALITATIVE U: NEGATIVE
GLUCOSE SERPL-MCNC: 88 MG/DL
GLUCOSE SERPL-MCNC: 90 MG/DL
HBA1C MFR BLD HPLC: 6.5 %
HDLC SERPL-MCNC: 57 MG/DL
KETONES URINE: NEGATIVE
LDLC SERPL CALC-MCNC: 79 MG/DL
LEUKOCYTE ESTERASE URINE: ABNORMAL
MAGNESIUM SERPL-MCNC: 2.2 MG/DL
MICROALBUMIN 24H UR DL<=1MG/L-MCNC: <1.2 MG/DL
MICROALBUMIN/CREAT 24H UR-RTO: NORMAL MG/G
NITRITE URINE: POSITIVE
PH URINE: 6.5
POTASSIUM SERPL-SCNC: 4.3 MMOL/L
PROT SERPL-MCNC: 7.4 G/DL
PROTEIN URINE: NORMAL
SODIUM SERPL-SCNC: 141 MMOL/L
SPECIFIC GRAVITY URINE: 1.01
TRIGL SERPL-MCNC: 199 MG/DL
TSH SERPL-ACNC: 1.65 UIU/ML
UROBILINOGEN URINE: NORMAL
VIT B12 SERPL-MCNC: 897 PG/ML

## 2019-09-12 LAB
BASOPHILS # BLD AUTO: 0.01 K/UL
BASOPHILS NFR BLD AUTO: 0.1 %
EOSINOPHIL # BLD AUTO: 0.02 K/UL
EOSINOPHIL NFR BLD AUTO: 0.2 %
HCT VFR BLD CALC: 44.1 %
HGB BLD-MCNC: 13.1 G/DL
IMM GRANULOCYTES NFR BLD AUTO: 0.3 %
LYMPHOCYTES # BLD AUTO: 1.46 K/UL
LYMPHOCYTES NFR BLD AUTO: 16.8 %
MAN DIFF?: NORMAL
MCHC RBC-ENTMCNC: 29.1 PG
MCHC RBC-ENTMCNC: 29.7 GM/DL
MCV RBC AUTO: 98 FL
MONOCYTES # BLD AUTO: 0.72 K/UL
MONOCYTES NFR BLD AUTO: 8.3 %
NEUTROPHILS # BLD AUTO: 6.43 K/UL
NEUTROPHILS NFR BLD AUTO: 74.3 %
PLATELET # BLD AUTO: 261 K/UL
RBC # BLD: 4.5 M/UL
RBC # FLD: 14.6 %
WBC # FLD AUTO: 8.67 K/UL

## 2019-10-13 ENCOUNTER — RX RENEWAL (OUTPATIENT)
Age: 79
End: 2019-10-13

## 2019-10-15 ENCOUNTER — RX RENEWAL (OUTPATIENT)
Age: 79
End: 2019-10-15

## 2019-10-30 ENCOUNTER — RX RENEWAL (OUTPATIENT)
Age: 79
End: 2019-10-30

## 2019-11-01 ENCOUNTER — RX RENEWAL (OUTPATIENT)
Age: 79
End: 2019-11-01

## 2019-12-08 ENCOUNTER — INPATIENT (INPATIENT)
Facility: HOSPITAL | Age: 79
LOS: 4 days | Discharge: HOME | DRG: 291 | End: 2019-12-13
Attending: HOSPITALIST | Admitting: HOSPITALIST
Payer: MEDICARE

## 2019-12-08 VITALS
HEIGHT: 64 IN | OXYGEN SATURATION: 97 % | WEIGHT: 190.04 LBS | DIASTOLIC BLOOD PRESSURE: 79 MMHG | SYSTOLIC BLOOD PRESSURE: 177 MMHG | RESPIRATION RATE: 20 BRPM | HEART RATE: 75 BPM

## 2019-12-08 DIAGNOSIS — L03.90 CELLULITIS, UNSPECIFIED: ICD-10-CM

## 2019-12-08 DIAGNOSIS — Z96.651 PRESENCE OF RIGHT ARTIFICIAL KNEE JOINT: Chronic | ICD-10-CM

## 2019-12-08 DIAGNOSIS — I10 ESSENTIAL (PRIMARY) HYPERTENSION: ICD-10-CM

## 2019-12-08 DIAGNOSIS — Z02.9 ENCOUNTER FOR ADMINISTRATIVE EXAMINATIONS, UNSPECIFIED: ICD-10-CM

## 2019-12-08 DIAGNOSIS — I82.409 ACUTE EMBOLISM AND THROMBOSIS OF UNSPECIFIED DEEP VEINS OF UNSPECIFIED LOWER EXTREMITY: ICD-10-CM

## 2019-12-08 DIAGNOSIS — Z90.11 ACQUIRED ABSENCE OF RIGHT BREAST AND NIPPLE: Chronic | ICD-10-CM

## 2019-12-08 DIAGNOSIS — Z98.89 OTHER SPECIFIED POSTPROCEDURAL STATES: Chronic | ICD-10-CM

## 2019-12-08 DIAGNOSIS — N18.3 CHRONIC KIDNEY DISEASE, STAGE 3 (MODERATE): ICD-10-CM

## 2019-12-08 DIAGNOSIS — I50.33 ACUTE ON CHRONIC DIASTOLIC (CONGESTIVE) HEART FAILURE: ICD-10-CM

## 2019-12-08 DIAGNOSIS — M25.551 PAIN IN RIGHT HIP: ICD-10-CM

## 2019-12-08 DIAGNOSIS — I89.0 LYMPHEDEMA, NOT ELSEWHERE CLASSIFIED: ICD-10-CM

## 2019-12-08 DIAGNOSIS — Z29.9 ENCOUNTER FOR PROPHYLACTIC MEASURES, UNSPECIFIED: ICD-10-CM

## 2019-12-08 DIAGNOSIS — E11.9 TYPE 2 DIABETES MELLITUS WITHOUT COMPLICATIONS: ICD-10-CM

## 2019-12-08 DIAGNOSIS — I25.10 ATHEROSCLEROTIC HEART DISEASE OF NATIVE CORONARY ARTERY WITHOUT ANGINA PECTORIS: ICD-10-CM

## 2019-12-08 DIAGNOSIS — Z93.2 ILEOSTOMY STATUS: Chronic | ICD-10-CM

## 2019-12-08 DIAGNOSIS — Z90.49 ACQUIRED ABSENCE OF OTHER SPECIFIED PARTS OF DIGESTIVE TRACT: Chronic | ICD-10-CM

## 2019-12-08 LAB
ALBUMIN SERPL ELPH-MCNC: 3.6 G/DL — SIGNIFICANT CHANGE UP (ref 3.3–5)
ALP SERPL-CCNC: 66 U/L — SIGNIFICANT CHANGE UP (ref 40–120)
ALT FLD-CCNC: 12 U/L — SIGNIFICANT CHANGE UP (ref 10–45)
ANION GAP SERPL CALC-SCNC: 17 MMOL/L — SIGNIFICANT CHANGE UP (ref 5–17)
ANISOCYTOSIS BLD QL: SLIGHT — SIGNIFICANT CHANGE UP
APPEARANCE UR: CLEAR — SIGNIFICANT CHANGE UP
APTT BLD: 34.9 SEC — SIGNIFICANT CHANGE UP (ref 27.5–36.3)
AST SERPL-CCNC: 11 U/L — SIGNIFICANT CHANGE UP (ref 10–40)
BACTERIA # UR AUTO: NEGATIVE — SIGNIFICANT CHANGE UP
BASE EXCESS BLDV CALC-SCNC: 5.5 MMOL/L — HIGH (ref -2–2)
BASOPHILS # BLD AUTO: 0 K/UL — SIGNIFICANT CHANGE UP (ref 0–0.2)
BASOPHILS NFR BLD AUTO: 0 % — SIGNIFICANT CHANGE UP (ref 0–2)
BILIRUB SERPL-MCNC: 0.3 MG/DL — SIGNIFICANT CHANGE UP (ref 0.2–1.2)
BILIRUB UR-MCNC: NEGATIVE — SIGNIFICANT CHANGE UP
BUN SERPL-MCNC: 29 MG/DL — HIGH (ref 7–23)
CA-I SERPL-SCNC: 1.12 MMOL/L — SIGNIFICANT CHANGE UP (ref 1.12–1.3)
CALCIUM SERPL-MCNC: 9.5 MG/DL — SIGNIFICANT CHANGE UP (ref 8.4–10.5)
CHLORIDE BLDV-SCNC: 106 MMOL/L — SIGNIFICANT CHANGE UP (ref 96–108)
CHLORIDE SERPL-SCNC: 100 MMOL/L — SIGNIFICANT CHANGE UP (ref 96–108)
CO2 BLDV-SCNC: 32 MMOL/L — HIGH (ref 22–30)
CO2 SERPL-SCNC: 24 MMOL/L — SIGNIFICANT CHANGE UP (ref 22–31)
COLOR SPEC: SIGNIFICANT CHANGE UP
CREAT SERPL-MCNC: 1.83 MG/DL — HIGH (ref 0.5–1.3)
DIFF PNL FLD: NEGATIVE — SIGNIFICANT CHANGE UP
EOSINOPHIL # BLD AUTO: 0 K/UL — SIGNIFICANT CHANGE UP (ref 0–0.5)
EOSINOPHIL NFR BLD AUTO: 0 % — SIGNIFICANT CHANGE UP (ref 0–6)
EPI CELLS # UR: 0 /HPF — SIGNIFICANT CHANGE UP
ERYTHROCYTE [SEDIMENTATION RATE] IN BLOOD: 60 MM/HR — HIGH (ref 0–20)
GAS PNL BLDV: 136 MMOL/L — SIGNIFICANT CHANGE UP (ref 135–145)
GAS PNL BLDV: SIGNIFICANT CHANGE UP
GAS PNL BLDV: SIGNIFICANT CHANGE UP
GLUCOSE BLDC GLUCOMTR-MCNC: 145 MG/DL — HIGH (ref 70–99)
GLUCOSE BLDV-MCNC: 146 MG/DL — HIGH (ref 70–99)
GLUCOSE SERPL-MCNC: 158 MG/DL — HIGH (ref 70–99)
GLUCOSE UR QL: NEGATIVE — SIGNIFICANT CHANGE UP
HCO3 BLDV-SCNC: 30 MMOL/L — HIGH (ref 21–29)
HCT VFR BLD CALC: 33.4 % — LOW (ref 34.5–45)
HCT VFR BLDA CALC: 35 % — LOW (ref 39–50)
HGB BLD CALC-MCNC: 11.3 G/DL — LOW (ref 11.5–15.5)
HGB BLD-MCNC: 10.7 G/DL — LOW (ref 11.5–15.5)
HYALINE CASTS # UR AUTO: 0 /LPF — SIGNIFICANT CHANGE UP (ref 0–2)
HYPOCHROMIA BLD QL: SLIGHT — SIGNIFICANT CHANGE UP
INR BLD: 1.21 RATIO — HIGH (ref 0.88–1.16)
KETONES UR-MCNC: NEGATIVE — SIGNIFICANT CHANGE UP
LACTATE BLDV-MCNC: 1.3 MMOL/L — SIGNIFICANT CHANGE UP (ref 0.7–2)
LEUKOCYTE ESTERASE UR-ACNC: NEGATIVE — SIGNIFICANT CHANGE UP
LYMPHOCYTES # BLD AUTO: 0.67 K/UL — LOW (ref 1–3.3)
LYMPHOCYTES # BLD AUTO: 7.9 % — LOW (ref 13–44)
MACROCYTES BLD QL: SLIGHT — SIGNIFICANT CHANGE UP
MANUAL SMEAR VERIFICATION: SIGNIFICANT CHANGE UP
MCHC RBC-ENTMCNC: 30.3 PG — SIGNIFICANT CHANGE UP (ref 27–34)
MCHC RBC-ENTMCNC: 32 GM/DL — SIGNIFICANT CHANGE UP (ref 32–36)
MCV RBC AUTO: 94.6 FL — SIGNIFICANT CHANGE UP (ref 80–100)
MONOCYTES # BLD AUTO: 0.67 K/UL — SIGNIFICANT CHANGE UP (ref 0–0.9)
MONOCYTES NFR BLD AUTO: 7.9 % — SIGNIFICANT CHANGE UP (ref 2–14)
NEUTROPHILS # BLD AUTO: 7.14 K/UL — SIGNIFICANT CHANGE UP (ref 1.8–7.4)
NEUTROPHILS NFR BLD AUTO: 84.2 % — HIGH (ref 43–77)
NITRITE UR-MCNC: NEGATIVE — SIGNIFICANT CHANGE UP
NT-PROBNP SERPL-SCNC: 1027 PG/ML — HIGH (ref 0–300)
PCO2 BLDV: 46 MMHG — SIGNIFICANT CHANGE UP (ref 35–50)
PH BLDV: 7.43 — SIGNIFICANT CHANGE UP (ref 7.35–7.45)
PH UR: 6 — SIGNIFICANT CHANGE UP (ref 5–8)
PLAT MORPH BLD: NORMAL — SIGNIFICANT CHANGE UP
PLATELET # BLD AUTO: 272 K/UL — SIGNIFICANT CHANGE UP (ref 150–400)
PO2 BLDV: 44 MMHG — SIGNIFICANT CHANGE UP (ref 25–45)
POLYCHROMASIA BLD QL SMEAR: SLIGHT — SIGNIFICANT CHANGE UP
POTASSIUM BLDV-SCNC: 3.3 MMOL/L — LOW (ref 3.5–5.3)
POTASSIUM SERPL-MCNC: 3.4 MMOL/L — LOW (ref 3.5–5.3)
POTASSIUM SERPL-SCNC: 3.4 MMOL/L — LOW (ref 3.5–5.3)
PROT SERPL-MCNC: 7 G/DL — SIGNIFICANT CHANGE UP (ref 6–8.3)
PROT UR-MCNC: ABNORMAL
PROTHROM AB SERPL-ACNC: 14 SEC — HIGH (ref 10–12.9)
RBC # BLD: 3.53 M/UL — LOW (ref 3.8–5.2)
RBC # FLD: 14.5 % — SIGNIFICANT CHANGE UP (ref 10.3–14.5)
RBC BLD AUTO: ABNORMAL
RBC CASTS # UR COMP ASSIST: 3 /HPF — SIGNIFICANT CHANGE UP (ref 0–4)
SAO2 % BLDV: 79 % — SIGNIFICANT CHANGE UP (ref 67–88)
SODIUM SERPL-SCNC: 141 MMOL/L — SIGNIFICANT CHANGE UP (ref 135–145)
SP GR SPEC: 1.02 — SIGNIFICANT CHANGE UP (ref 1.01–1.02)
UROBILINOGEN FLD QL: NEGATIVE — SIGNIFICANT CHANGE UP
WBC # BLD: 8.48 K/UL — SIGNIFICANT CHANGE UP (ref 3.8–10.5)
WBC # FLD AUTO: 8.48 K/UL — SIGNIFICANT CHANGE UP (ref 3.8–10.5)
WBC UR QL: 1 /HPF — SIGNIFICANT CHANGE UP (ref 0–5)

## 2019-12-08 PROCEDURE — 99223 1ST HOSP IP/OBS HIGH 75: CPT | Mod: GC

## 2019-12-08 PROCEDURE — 93308 TTE F-UP OR LMTD: CPT | Mod: 26

## 2019-12-08 PROCEDURE — 71045 X-RAY EXAM CHEST 1 VIEW: CPT | Mod: 26

## 2019-12-08 PROCEDURE — 99285 EMERGENCY DEPT VISIT HI MDM: CPT | Mod: GC

## 2019-12-08 RX ORDER — OXYCODONE HYDROCHLORIDE 5 MG/1
5 TABLET ORAL EVERY 4 HOURS
Refills: 0 | Status: DISCONTINUED | OUTPATIENT
Start: 2019-12-08 | End: 2019-12-13

## 2019-12-08 RX ORDER — APIXABAN 2.5 MG/1
2.5 TABLET, FILM COATED ORAL EVERY 12 HOURS
Refills: 0 | Status: DISCONTINUED | OUTPATIENT
Start: 2019-12-08 | End: 2019-12-13

## 2019-12-08 RX ORDER — PREGABALIN 225 MG/1
1000 CAPSULE ORAL DAILY
Refills: 0 | Status: DISCONTINUED | OUTPATIENT
Start: 2019-12-08 | End: 2019-12-13

## 2019-12-08 RX ORDER — SODIUM CHLORIDE 9 MG/ML
1000 INJECTION, SOLUTION INTRAVENOUS
Refills: 0 | Status: DISCONTINUED | OUTPATIENT
Start: 2019-12-08 | End: 2019-12-13

## 2019-12-08 RX ORDER — SPIRONOLACTONE 25 MG/1
12.5 TABLET, FILM COATED ORAL DAILY
Refills: 0 | Status: DISCONTINUED | OUTPATIENT
Start: 2019-12-08 | End: 2019-12-08

## 2019-12-08 RX ORDER — DEXTROSE 50 % IN WATER 50 %
25 SYRINGE (ML) INTRAVENOUS ONCE
Refills: 0 | Status: DISCONTINUED | OUTPATIENT
Start: 2019-12-08 | End: 2019-12-13

## 2019-12-08 RX ORDER — FUROSEMIDE 40 MG
40 TABLET ORAL EVERY 12 HOURS
Refills: 0 | Status: DISCONTINUED | OUTPATIENT
Start: 2019-12-08 | End: 2019-12-10

## 2019-12-08 RX ORDER — ACETAMINOPHEN 500 MG
2 TABLET ORAL
Qty: 0 | Refills: 0 | DISCHARGE

## 2019-12-08 RX ORDER — GLUCAGON INJECTION, SOLUTION 0.5 MG/.1ML
1 INJECTION, SOLUTION SUBCUTANEOUS ONCE
Refills: 0 | Status: DISCONTINUED | OUTPATIENT
Start: 2019-12-08 | End: 2019-12-13

## 2019-12-08 RX ORDER — ATORVASTATIN CALCIUM 80 MG/1
10 TABLET, FILM COATED ORAL AT BEDTIME
Refills: 0 | Status: DISCONTINUED | OUTPATIENT
Start: 2019-12-08 | End: 2019-12-13

## 2019-12-08 RX ORDER — ISOSORBIDE MONONITRATE 60 MG/1
60 TABLET, EXTENDED RELEASE ORAL DAILY
Refills: 0 | Status: DISCONTINUED | OUTPATIENT
Start: 2019-12-08 | End: 2019-12-13

## 2019-12-08 RX ORDER — OXYCODONE HYDROCHLORIDE 5 MG/1
10 TABLET ORAL EVERY 8 HOURS
Refills: 0 | Status: DISCONTINUED | OUTPATIENT
Start: 2019-12-08 | End: 2019-12-12

## 2019-12-08 RX ORDER — DEXTROSE 50 % IN WATER 50 %
12.5 SYRINGE (ML) INTRAVENOUS ONCE
Refills: 0 | Status: DISCONTINUED | OUTPATIENT
Start: 2019-12-08 | End: 2019-12-13

## 2019-12-08 RX ORDER — VANCOMYCIN HCL 1 G
1000 VIAL (EA) INTRAVENOUS ONCE
Refills: 0 | Status: COMPLETED | OUTPATIENT
Start: 2019-12-08 | End: 2019-12-08

## 2019-12-08 RX ORDER — ACETAMINOPHEN 500 MG
650 TABLET ORAL EVERY 6 HOURS
Refills: 0 | Status: DISCONTINUED | OUTPATIENT
Start: 2019-12-08 | End: 2019-12-13

## 2019-12-08 RX ORDER — HYDRALAZINE HCL 50 MG
100 TABLET ORAL EVERY 8 HOURS
Refills: 0 | Status: DISCONTINUED | OUTPATIENT
Start: 2019-12-08 | End: 2019-12-13

## 2019-12-08 RX ORDER — INSULIN LISPRO 100/ML
VIAL (ML) SUBCUTANEOUS
Refills: 0 | Status: DISCONTINUED | OUTPATIENT
Start: 2019-12-08 | End: 2019-12-13

## 2019-12-08 RX ORDER — INSULIN LISPRO 100/ML
VIAL (ML) SUBCUTANEOUS AT BEDTIME
Refills: 0 | Status: DISCONTINUED | OUTPATIENT
Start: 2019-12-08 | End: 2019-12-13

## 2019-12-08 RX ORDER — SPIRONOLACTONE 25 MG/1
12.5 TABLET, FILM COATED ORAL DAILY
Refills: 0 | Status: DISCONTINUED | OUTPATIENT
Start: 2019-12-08 | End: 2019-12-13

## 2019-12-08 RX ORDER — GABAPENTIN 400 MG/1
300 CAPSULE ORAL DAILY
Refills: 0 | Status: DISCONTINUED | OUTPATIENT
Start: 2019-12-08 | End: 2019-12-13

## 2019-12-08 RX ORDER — METOPROLOL TARTRATE 50 MG
25 TABLET ORAL DAILY
Refills: 0 | Status: DISCONTINUED | OUTPATIENT
Start: 2019-12-08 | End: 2019-12-13

## 2019-12-08 RX ORDER — CHOLECALCIFEROL (VITAMIN D3) 125 MCG
1000 CAPSULE ORAL DAILY
Refills: 0 | Status: DISCONTINUED | OUTPATIENT
Start: 2019-12-08 | End: 2019-12-10

## 2019-12-08 RX ORDER — DEXTROSE 50 % IN WATER 50 %
15 SYRINGE (ML) INTRAVENOUS ONCE
Refills: 0 | Status: DISCONTINUED | OUTPATIENT
Start: 2019-12-08 | End: 2019-12-13

## 2019-12-08 RX ADMIN — ATORVASTATIN CALCIUM 10 MILLIGRAM(S): 80 TABLET, FILM COATED ORAL at 23:43

## 2019-12-08 RX ADMIN — Medication 100 MILLIGRAM(S): at 23:43

## 2019-12-08 RX ADMIN — Medication 250 MILLIGRAM(S): at 16:52

## 2019-12-08 RX ADMIN — OXYCODONE HYDROCHLORIDE 10 MILLIGRAM(S): 5 TABLET ORAL at 23:42

## 2019-12-08 NOTE — H&P ADULT - PROBLEM SELECTOR PLAN 3
Hypertensive on admission, will restart home meds  - c/w home hydralazine, isosorbide, metoprolol, and spironolactone

## 2019-12-08 NOTE — ED PROVIDER NOTE - OBJECTIVE STATEMENT
Attending Kirti Morataya: 78 y/o female presenting with LE swelling and redness. perdaughter increased swelling with associated redness for last 2 days associated with pain. last took tylenol this am. does not think she has gained weight. is on eloquis for reports of prior dvt and compliaint. pt denies any chest pain or sob. no fevers or chills. pain is constant since starting. does have a h/o dm. Attending Kirti Morataya: 80 y/o female presenting with LE swelling and redness. perdaughter increased swelling with associated redness for last 2 days associated with pain. last took tylenol this am. does not think she has gained weight. is on eloquis for reports of prior dvt and compliaint. pt denies any chest pain or sob. no fevers or chills. pain is constant since starting. does have a h/o dm. also has a h/o right renal obstruction and was instructed to follow up with a urologist.

## 2019-12-08 NOTE — ED PROVIDER NOTE - CLINICAL SUMMARY MEDICAL DECISION MAKING FREE TEXT BOX
Attending Kirti Morataya: 78 y/o female with multiple medical issues presenting with lower extremity edema and erythema. pt on blood thinners for known pe and compliant making worsening dvt less likely. h/o diastolic dysfunction. unclear whether sig weight gain, pt denies any sob ro difficulty breathing. suspect likely cellulitis. pt given abx. will admit. no crepitus on exam to suggest nec fascitis

## 2019-12-08 NOTE — ED PROVIDER NOTE - PHYSICAL EXAMINATION
Attending Kirti Morataya: Gen: NAD, heent: atrauamtic, eomi, perrla, mmm, op pink, uvula midline, neck; nttp, no nuchal rigidity, chest: nttp, no crepitus, cv: rrr, no murmurs, lungs: scant wheeze, abd: soft, nontender, nondistended, no peritoneal signs, +BS, no guarding, ext: wwp,b/l LE edema and erythema with+warmth. no crepitus. lymphadema to right arm, skin: no rash, neuro: awake and alert, following commands, speech clear, sensation and strength intact, no focal deficits

## 2019-12-08 NOTE — H&P ADULT - ATTENDING COMMENTS
Patient assigned to me by night hospitalist in charge for management and care for patient for this evening only. Care to be resumed by day hospitalist in the morning and thereafter. Patient assigned to me by night hospitalist in charge for management and care for patient for this evening only. Care to be resumed by day hospitalist in the morning and thereafter.    This patient is a pleasant 78yo lady with PMH of breast CA s/p mastectomy with chronic RUE lymphedema, rectal CA s/p ileostomy reversal, RLE DVT, T2DM, CAD, CKD stage 3 who presents to the ED with complaint of progressive lower extremity swelling and redness.   On exam: alert, conversant, lungs with trace crackles at bases, cardiac exam NS1S2 no r/m/g, abd soft and nontender, lower extremity edema extending to lower thighs bilaterally, bilateral lower extremity erythema from above ankles to below level of knees with shiny appearance of skin but no exudates or open lesions. Erythematous skin is mildly tender to palpation. Patient can move lower extremities without pain.   Agree with plan noted above to treat hypervolemia with more aggressive diuresis, check TTE, monitor daily weights and I&Os. Cellulitis is lower on differential given absence of leukocytosis, fever, and since erythema is bilateral. Continue to monitor level of erythema.

## 2019-12-08 NOTE — ED ADULT NURSE NOTE - OBJECTIVE STATEMENT
79 year old female presenting to ED by EMS from home, accompanied by daughter, c/o bilateral lower extremity erythema and edema. Pt primarily speaks Polish with daughter translating. Pt reports noticing worsening redness, swelling, and warmth for a couple of days, with constant 10/10 pain in bilateral lower extremities. Pt and family report right arm swelling at baseline. PMH includes right arm lymphedema, right side breast cancer, DVT, which she takes Eliquis for, renal insufficiency, CHF, type 2 diabetes, rectal cancer, HLD, and HTN. Pt denies headache, SOB, chest pain, N/V, abdominal pain, burning or frequency with urination, change in bowel patterns, numbness or tingling. 79 year old female presenting to ED by EMS from home, accompanied by daughter, c/o bilateral lower extremity erythema and edema. Pt primarily speaks Wolof with daughter translating. Pt reports noticing worsening redness, swelling, and warmth for a couple of days, with constant 10/10 pain in bilateral lower extremities. Pt and family report right arm swelling at baseline. PMH includes right arm lymphedema, right side breast cancer with mastectomy, DVT, which she takes Eliquis for, renal insufficiency, CHF, type 2 diabetes, rectal cancer, HLD, and HTN. Pt denies headache, SOB, chest pain, N/V, abdominal pain, burning or frequency with urination, change in bowel patterns, numbness or tingling. Pt denies fever/chills but she takes tylenol regularly for pain. 79 year old female presenting to ED by EMS from home, accompanied by daughter, c/o bilateral lower extremity erythema and edema. Pt primarily speaks Slovenian with daughter translating. Pt reports noticing worsening redness, swelling, and warmth for a couple of days, with constant 10/10 pain in bilateral lower extremities from the hips down to feet. Pt and family report right arm swelling at baseline. PMH includes right arm lymphedema, right side breast cancer with mastectomy, DVT, which she takes Eliquis for, renal insufficiency, CHF, type 2 diabetes, rectal cancer, HLD, and HTN. Pt denies headache, SOB, chest pain, N/V, abdominal pain, burning or frequency with urination, change in bowel patterns, numbness or tingling. Pt denies fever/chills but she takes tylenol regularly for pain.

## 2019-12-08 NOTE — ED ADULT NURSE REASSESSMENT NOTE - NS ED NURSE REASSESS COMMENT FT1
Pt straight cathed by ED RN using sterile technique, 300mL clear yellow urine drained from bladder and specimen sent to lab.

## 2019-12-08 NOTE — H&P ADULT - PROBLEM SELECTOR PLAN 8
RLE DVT in 02/2019, on renally dosed Eliquis  - c/w Eliquis 2.5mg BID RLE DVT in 02/2019, on renally dosed Eliquis  - c/w Eliquis 2.5mg BID  - will also check BLE VA duplex to assess for changes in known lower extremity thrombus

## 2019-12-08 NOTE — H&P ADULT - PROBLEM SELECTOR PLAN 6
On Januvia and glimepiride at home  - ISS  - fs qac/qhs On Januvia and glimepiride at home, hold for now  - start sliding scale lispro  - fs qac/qhs

## 2019-12-08 NOTE — ED ADULT NURSE REASSESSMENT NOTE - NS ED NURSE REASSESS COMMENT FT1
Received report from Glenis RN/Katrin RN. Pt. A&OX3, sitting up in stretcher, skin pink warm and dry. Pt. does not appear to be in any acute distress- nonlabored breathing noted and pt. is speaking in full coherent sentences. Redness/edema noted to RUE, as per family this is pt. baseline. Worsening edema and redness noted to bilateral lower extremities. Strong peripheral pulses noted.  Will cont. to monitor. Safety and comfort provided. Family at bedside. Pt. admitted and awaiting bed assignment.

## 2019-12-08 NOTE — H&P ADULT - PROBLEM SELECTOR PLAN 2
NST (05/2019) showed areas of infarct, no hx of LHC, not a candidate due to CKD.  - EKG no acute changes  - no active CP NST (05/2019) showed areas of infarct, no hx of LHC, not a candidate due to CKD.  - EKG no acute changes  - no active CP  - check trop

## 2019-12-08 NOTE — H&P ADULT - NSHPPHYSICALEXAM_GEN_ALL_CORE
Vital Signs Last 24 Hrs  T(C): 37.1 (08 Dec 2019 19:30), Max: 37.4 (08 Dec 2019 16:55)  T(F): 98.7 (08 Dec 2019 19:30), Max: 99.4 (08 Dec 2019 16:55)  HR: 76 (08 Dec 2019 19:30) (75 - 77)  BP: 157/76 (08 Dec 2019 19:30) (153/85 - 177/79)  BP(mean): --  RR: 19 (08 Dec 2019 19:30) (18 - 20)  SpO2: 95% (08 Dec 2019 19:30) (95% - 98%)    GENERAL: NAD, well-developed  HEAD:  Atraumatic, normocephalic  EYES: EOMI, conjunctiva and sclera clear  MOUTH: MMM, no lesions  NECK: Supple, no appreciable masses  LUNG: Clear to auscultation bilaterally, no increased work of breathing  CHEST: S1/S2+, no S3/S4, RRR, no murmurs appreciated  ABDOMEN: Soft, nontender, nondistended, +BS in all 4 quadrants  : No CVA tenderness, no suprapubic tenderness, no chen catheter  EXTREMITIES:  2+ pitting LE edema b/l to thighs, +chronic lymphedema of RUE  NEURO: AAOx3, no focal deficits, CUEVAS  SKIN: b/l LE warmth and redness, no ulcers Vital Signs Last 24 Hrs  T(C): 37.1 (08 Dec 2019 19:30), Max: 37.4 (08 Dec 2019 16:55)  T(F): 98.7 (08 Dec 2019 19:30), Max: 99.4 (08 Dec 2019 16:55)  HR: 76 (08 Dec 2019 19:30) (75 - 77)  BP: 157/76 (08 Dec 2019 19:30) (153/85 - 177/79)  BP(mean): --  RR: 19 (08 Dec 2019 19:30) (18 - 20)  SpO2: 95% (08 Dec 2019 19:30) (95% - 98%)    GENERAL: NAD, well-developed  HEAD:  Atraumatic, normocephalic  EYES: EOMI, conjunctiva and sclera clear  MOUTH: MMM, no lesions  NECK: Supple, no appreciable masses  LUNG: Clear to auscultation bilaterally, no increased work of breathing  CHEST: S1/S2+, no S3/S4, RRR, no murmurs appreciated  ABDOMEN: Soft, nontender, nondistended, +BS in all 4 quadrants  : No CVA tenderness, no suprapubic tenderness, no chen catheter  EXTREMITIES:  2+ pitting LE edema b/l to thighs, +chronic lymphedema of RUE  NEURO: AAOx3, no focal deficits, CUEVAS  SKIN: b/l LE warmth and redness, redness improves with passive leg raise, no ulcers

## 2019-12-08 NOTE — ED ADULT NURSE NOTE - NSIMPLEMENTINTERV_GEN_ALL_ED
Implemented All Fall with Harm Risk Interventions:  [place] to call system. Call bell, personal items and telephone within reach. Instruct patient to call for assistance. Room bathroom lighting operational. Non-slip footwear when patient is off stretcher. Physically safe environment: no spills, clutter or unnecessary equipment. Stretcher in lowest position, wheels locked, appropriate side rails in place. Provide visual cue, wrist band, yellow gown, etc. Monitor gait and stability. Monitor for mental status changes and reorient to person, place, and time. Review medications for side effects contributing to fall risk. Reinforce activity limits and safety measures with patient and family. Provide visual clues: red socks.

## 2019-12-08 NOTE — CHART NOTE - NSCHARTNOTEFT_GEN_A_CORE
iSTOP Chart Note    iSTOP ID#: [id number]      Patient Name:	Fernanda Batista	YOB: 1940  Address:	[address] [place] [address]	Sex:	Female    11/14/2019	11/18/2019	oxycodone hcl er 10 mg tablet	90	30	VinceMarcon  10/17/2019	10/23/2019	oxycodone hcl er 10 mg tablet	90	30	Dangelo, Rolan  09/19/2019	09/20/2019	oxycodone hcl er 10 mg tablet	60	30	Dangelo, Rolan  08/22/2019	08/23/2019	oxycodone hcl 10 mg tablet	75	25	Solitario Vance MD  07/25/2019	07/25/2019	oxycodone hcl 10 mg tablet	75	26	Solitario Vance MD  12/24/2018	12/24/2018	oxycodone hcl er 10 mg tablet	20	10	Alexander Baum MD  12/14/2018	12/14/2018	oxycodone hcl er 10 mg tablet	20	10	Alexander Baum MD, MD  PGY3 IM

## 2019-12-08 NOTE — H&P ADULT - NSHPREVIEWOFSYSTEMS_GEN_ALL_CORE
CONSTITUTIONAL: No fever, no chills, no weight loss  EYES: No eye pain, no visual disturbance  ENT: no sore throat, no runny nose  RESPIRATORY: No cough, No SOB  CARDIOVASCULAR: No CP, no palpitations  GASTROINTESTINAL: no abdominal pain, no n/v/d  GENITOURINARY: No dysuria, no hematuria  HEME: No easy bruising, no bleeding gums  NEURO: No headaches, no weakness  SKIN: No itching, no rashes  MSK: +R hip pain, no joint swelling CONSTITUTIONAL: No fever, no chills, no weight loss  EYES: No eye pain, no visual disturbance  ENT: no sore throat, no runny nose  RESPIRATORY: No cough, No SOB  CARDIOVASCULAR: No CP, no palpitations  GASTROINTESTINAL: no abdominal pain, no n/v/d  GENITOURINARY: No dysuria, no hematuria  HEME: No easy bruising, no bleeding gums  NEURO: No headaches, no weakness  SKIN: No itching, + BLE redness and pain  MSK: +R hip pain, no joint swelling

## 2019-12-08 NOTE — H&P ADULT - PROBLEM SELECTOR PLAN 1
Presenting with worsening b/l LE swelling and redness, concern for mild CHF exacerbation. No pulmonary edema on CXR. Satting well on RA. NST in 05/2019 showed one fixed defect and one reversible defect consistent with areas of infarct, was not a candidate for LHC given renal dysfunction.  - check TTE to assess for new segmental wall motion abnormalities  - diurese with Lasix 40mg IV BID  - daily weights, strict I/Os  - will not order trops given no active CP, no EKG changes, and not candidate for LHC. Presenting with worsening b/l LE swelling and redness, concern for mild CHF exacerbation. Lower suspicion for b/l LE cellulitis, redness improves with leg raise. No pulmonary edema on CXR. Satting well on RA. NST in 05/2019 showed one fixed defect and one reversible defect consistent with areas of infarct, was not a candidate for LHC given renal dysfunction.  - check TTE to assess for new segmental wall motion abnormalities  - diurese with Lasix 40mg IV BID  - daily weights, strict I/Os  - will not order trops given no active CP, no EKG changes, and not candidate for LHC. Presenting with worsening b/l LE swelling and redness, concern for mild CHF exacerbation. Lower suspicion for b/l LE cellulitis, redness improves with leg raise. No pulmonary edema on CXR. Satting well on RA. NST in 05/2019 showed one fixed defect and one reversible defect consistent with areas of infarct, was not a candidate for LHC given renal dysfunction.  - check TTE to assess for new segmental wall motion abnormalities  - diurese with Lasix 40mg IV BID  - daily weights, strict I/Os  - check trop, likely to be elevated in setting of renal dysfunction

## 2019-12-08 NOTE — H&P ADULT - PROBLEM SELECTOR PLAN 4
Chronic R hip pain due to OA  - c/w home pain regimen oxycodone ER 10mg q8h  - oxycodone 5mg q4h PRN for severe pain

## 2019-12-08 NOTE — ED PROVIDER NOTE - ATTENDING CONTRIBUTION TO CARE
Attending MD Kirti Morataya:  I personally have seen and examined this patient.  Resident note reviewed and agree on plan of care and except where noted.  See HPI, PE, and MDM for details.

## 2019-12-08 NOTE — H&P ADULT - HISTORY OF PRESENT ILLNESS
78 y/o Polish speaking F PMHx of HFpEF (last EF 66%, stage II diastolic dysfunction), CKD (Stage III), breast cancer (s/p R mastectomy in 1989, c/b chronic RUE lymphedema, rectal cancer (s/p reversed ileostomy in 2015), RLE DVT (2/2019, on Eliquis), T2DM (A1C 6.7 in 4/2019),  HTN, and HLD, presented with LE swelling and redness. 78 y/o Divehi speaking F PMHx of HFpEF (last EF 66%, stage II diastolic dysfunction), CAD (s/p NST showing areas of infarct, no prior caths), CKD (Stage III), breast cancer (s/p R mastectomy in 1989, c/b chronic RUE lymphedema, rectal cancer (s/p reversed ileostomy in 2015), RLE DVT (2/2019, on Eliquis), chronic R hip pain, T2DM (A1C 6.7 in 4/2019), HTN, and HLD, presented with bilateral LE swelling and redness. Daughter Shauna at bedside provided translation. Reports she has been having worsening LE edema and redness over the last several days. Denies any trauma to the LEs. No change in diet or fluid intake. Adhering to Bumex 1mg BID, no missed doses. Usually does not lie flat due to discomfort. Denies BHAT/SOB, CP, cough, fever/chills, N/V, or diarrhea. At baseline, ambulates with a walker. Lives alone, but her children usually visit and help with ADLs. Has chronic R hip pain 2/2 OA. Was planned to undergo R hip replacement, but pre-Op NST showed multiple defects concerning for areas of infarct, was unable to undergo LHC due to CKD. Outpatient renal US showed severe R hydroureter w/ atrophied R kidney.     In the ED:  Vitals: afebrile, HR 75, /79, spO2 97% on RA  Labs: WBC 8.5, Cr 1.83 (baseline), procal 0.12  Imaging: CXR no infiltrates  Given vancomycin 1g in the ED 80 y/o Yakut speaking F PMHx of HFpEF (EF 60-65%, stage II diastolic dysfunction), CAD (s/p NST showing areas of infarct, no prior caths), CKD (Stage III), Breast cancer (s/p R mastectomy in 1989, c/b chronic RUE lymphedema, rectal cancer (s/p reversed ileostomy in 2015), RLE DVT (2/2019, on Eliquis), chronic R hip pain, T2DM (A1C 6.7 in 4/2019), HTN, and HLD, presented with bilateral LE swelling and redness. Daughter Shauna at bedside provided translation. Reports she has been having worsening LE edema and redness over the last several days. Denies any trauma to the LEs. No change in diet or fluid intake. Adhering to Bumex 1mg BID, no missed doses. Usually does not lie flat due to discomfort. Denies BHAT/SOB, CP, cough, fever/chills, N/V, or diarrhea. At baseline, ambulates with a walker. Lives alone, but her children usually visit and help with ADLs. Has chronic R hip pain 2/2 OA. Was planned to undergo R hip replacement, but pre-Op NST showed multiple defects concerning for areas of infarct, was unable to undergo LHC due to CKD. Outpatient renal US showed severe R hydroureter w/ atrophied R kidney.     In the ED:  Vitals: afebrile, HR 75, /79, spO2 97% on RA  Labs: WBC 8.5, Cr 1.83 (baseline), procal 0.12  Imaging: CXR no infiltrates  Given vancomycin 1g in the ED

## 2019-12-08 NOTE — H&P ADULT - NSICDXPASTMEDICALHX_GEN_ALL_CORE_FT
PAST MEDICAL HISTORY:  Breast CA, right 1989 s/p mastectomy ,IV Chemotherapy    CHF (congestive heart failure)     DVT of leg (deep venous thrombosis) right calf DVT  2/2019 pt on Eliquis    HTN (hypertension)     Hyperlipidemia     Lymphedema of arm right arm s/p mastectomy    Obese     Rectal cancer s/p chemotherapy and  radiation 12 weeks 2/2015 -3/2015    Renal insufficiency     Type II diabetes mellitus

## 2019-12-08 NOTE — H&P ADULT - NSICDXFAMILYHX_GEN_ALL_CORE_FT
FAMILY HISTORY:  Family history of diabetes mellitus in mother  Family history of diabetes mellitus in son    Child  Still living? Yes, Estimated age: Age Unknown  Family history of heart attack, Age at diagnosis: Age Unknown

## 2019-12-08 NOTE — H&P ADULT - PROBLEM SELECTOR PLAN 7
chronic RUE lympedema s/p R partial mastectomy for BCa in 1989  - continue to monitor chronic RUE lympedema s/p R partial mastectomy for BCa in 1989  - continue to monitor  - avoid blood draws, BP check on RUE

## 2019-12-08 NOTE — H&P ADULT - NSHPLABSRESULTS_GEN_ALL_CORE
141  |  100  |  29<H>  ----------------------------<  158<H>  3.4<L>   |  24  |  1.83<H>    Ca    9.5      08 Dec 2019 17:07    TPro  7.0  /  Alb  3.6  /  TBili  0.3  /  DBili  x   /  AST  11  /  ALT  12  /  AlkPhos  66  1208      PT/INR - ( 08 Dec 2019 17:07 )   PT: 14.0 sec;   INR: 1.21 ratio         PTT - ( 08 Dec 2019 17:07 )  PTT:34.9 sec              Urinalysis Basic - ( 08 Dec 2019 18:07 )    Color: Light Yellow / Appearance: Clear / S.017 / pH: x  Gluc: x / Ketone: Negative  / Bili: Negative / Urobili: Negative   Blood: x / Protein: Trace / Nitrite: Negative   Leuk Esterase: Negative / RBC: 3 /hpf / WBC 1 /HPF   Sq Epi: x / Non Sq Epi: 0 /hpf / Bacteria: Negative                              10.7   8.48  )-----------( 272      ( 08 Dec 2019 17:07 )             33.4     CAPILLARY BLOOD GLUCOSE    Blood Gas Source Venous: Venous ( @ 17:07)        RADIOLOGY:  < from: Xray Chest 1 View AP/PA (19 @ 17:16) >  EXAM:  XR CHEST AP OR PA 1V                        PROCEDURE DATE:  2019      ******PRELIMINARY REPORT******    ******PRELIMINARY REPORT******          INTERPRETATION:  no emergent findings  < end of copied text >    EKG: NSR, PVC, no acute ST-TW changes

## 2019-12-08 NOTE — ED ADULT NURSE NOTE - PSH
History of appendectomy  1953  S/P colon resection  2015  S/P ileostomy  low anterior resection-8/10/15, reversal of ileostomy 2016  S/P mastectomy, right  1989  S/P TKR (total knee replacement), right  2017

## 2019-12-08 NOTE — H&P ADULT - PROBLEM SELECTOR PLAN 9
DVT PROPH: Eliquis  Diet: CC  Dispo: pending pt DVT PROPH: Eliquis  Diet: CC  Dispo: pending improvement of BLE edema and erythema

## 2019-12-08 NOTE — ED ADULT NURSE NOTE - PMH
Breast CA, right  1989 s/p mastectomy ,IV Chemotherapy  CHF (congestive heart failure)    DVT of leg (deep venous thrombosis)  right calf DVT  2/2019 pt on Eliquis  HTN (hypertension)    Hyperlipidemia    Lymphedema of arm  right arm s/p mastectomy  Obese    Rectal cancer  s/p chemotherapy and  radiation 12 weeks 2/2015 -3/2015  Renal insufficiency    Type II diabetes mellitus

## 2019-12-08 NOTE — H&P ADULT - ASSESSMENT
80 y/o Slovak speaking F PMHx of HFpEF (last EF 66%, stage II diastolic dysfunction), CAD (s/p NST showing areas of infarct, no prior caths), CKD (Stage III), breast cancer (s/p R mastectomy in 1989, c/b chronic RUE lymphedema, rectal cancer (s/p reversed ileostomy in 2015), RLE DVT (2/2019, on Eliquis), chronic R hip pain, T2DM (A1C 6.7 in 4/2019), HTN, and HLD, presented with bilateral LE swelling and redness, concern for CHF exacerbation. 78 y/o Welsh speaking F PMHx of HFpEF (EF 60-65%, stage II diastolic dysfunction), CAD (s/p NST showing areas of infarct, no prior caths), CKD (Stage III), breast cancer (s/p R mastectomy in 1989, c/b chronic RUE lymphedema, rectal cancer (s/p reversed ileostomy in 2015), RLE DVT (2/2019, on Eliquis), chronic R hip pain, T2DM (A1C 6.7 in 4/2019), HTN, and HLD, presented with bilateral LE swelling and redness, concern for CHF exacerbation.

## 2019-12-08 NOTE — H&P ADULT - PROBLEM SELECTOR PLAN 10
Transitions of Care Status:  1.  Name of PCP:  2.  PCP Contacted on Admission: [ ] Y    [ ] N    3.  PCP contacted at Discharge: [ ] Y    [ ] N    [ ] N/A  4.  Post-Discharge Appointment Date and Location:  5.  Summary of Handoff given to PCP: Transitions of Care Status:  1.  Name of PCP: Dr. Sahni  2.  PCP Contacted on Admission: [ ] Y    [ ] N    3.  PCP contacted at Discharge: [ ] Y    [ ] N    [ ] N/A  4.  Post-Discharge Appointment Date and Location:  5.  Summary of Handoff given to PCP:

## 2019-12-09 LAB
ALBUMIN SERPL ELPH-MCNC: 3.9 G/DL — SIGNIFICANT CHANGE UP (ref 3.3–5)
ALP SERPL-CCNC: 69 U/L — SIGNIFICANT CHANGE UP (ref 40–120)
ALT FLD-CCNC: 10 U/L — SIGNIFICANT CHANGE UP (ref 10–45)
ANION GAP SERPL CALC-SCNC: 13 MMOL/L — SIGNIFICANT CHANGE UP (ref 5–17)
AST SERPL-CCNC: 16 U/L — SIGNIFICANT CHANGE UP (ref 10–40)
BASOPHILS # BLD AUTO: 0.02 K/UL — SIGNIFICANT CHANGE UP (ref 0–0.2)
BASOPHILS NFR BLD AUTO: 0.2 % — SIGNIFICANT CHANGE UP (ref 0–2)
BILIRUB SERPL-MCNC: 0.4 MG/DL — SIGNIFICANT CHANGE UP (ref 0.2–1.2)
BUN SERPL-MCNC: 28 MG/DL — HIGH (ref 7–23)
CALCIUM SERPL-MCNC: 8.6 MG/DL — SIGNIFICANT CHANGE UP (ref 8.4–10.5)
CHLORIDE SERPL-SCNC: 100 MMOL/L — SIGNIFICANT CHANGE UP (ref 96–108)
CO2 SERPL-SCNC: 27 MMOL/L — SIGNIFICANT CHANGE UP (ref 22–31)
CREAT SERPL-MCNC: 1.77 MG/DL — HIGH (ref 0.5–1.3)
CULTURE RESULTS: NO GROWTH — SIGNIFICANT CHANGE UP
EOSINOPHIL # BLD AUTO: 0 K/UL — SIGNIFICANT CHANGE UP (ref 0–0.5)
EOSINOPHIL NFR BLD AUTO: 0 % — SIGNIFICANT CHANGE UP (ref 0–6)
GLUCOSE BLDC GLUCOMTR-MCNC: 117 MG/DL — HIGH (ref 70–99)
GLUCOSE BLDC GLUCOMTR-MCNC: 149 MG/DL — HIGH (ref 70–99)
GLUCOSE BLDC GLUCOMTR-MCNC: 198 MG/DL — HIGH (ref 70–99)
GLUCOSE BLDC GLUCOMTR-MCNC: 213 MG/DL — HIGH (ref 70–99)
GLUCOSE SERPL-MCNC: 135 MG/DL — HIGH (ref 70–99)
HBA1C BLD-MCNC: 6.7 % — HIGH (ref 4–5.6)
HCT VFR BLD CALC: 37.5 % — SIGNIFICANT CHANGE UP (ref 34.5–45)
HGB BLD-MCNC: 11.7 G/DL — SIGNIFICANT CHANGE UP (ref 11.5–15.5)
IMM GRANULOCYTES NFR BLD AUTO: 0.4 % — SIGNIFICANT CHANGE UP (ref 0–1.5)
LYMPHOCYTES # BLD AUTO: 1.4 K/UL — SIGNIFICANT CHANGE UP (ref 1–3.3)
LYMPHOCYTES # BLD AUTO: 15.5 % — SIGNIFICANT CHANGE UP (ref 13–44)
MAGNESIUM SERPL-MCNC: 2 MG/DL — SIGNIFICANT CHANGE UP (ref 1.6–2.6)
MCHC RBC-ENTMCNC: 29.7 PG — SIGNIFICANT CHANGE UP (ref 27–34)
MCHC RBC-ENTMCNC: 31.2 GM/DL — LOW (ref 32–36)
MCV RBC AUTO: 95.2 FL — SIGNIFICANT CHANGE UP (ref 80–100)
MONOCYTES # BLD AUTO: 0.82 K/UL — SIGNIFICANT CHANGE UP (ref 0–0.9)
MONOCYTES NFR BLD AUTO: 9.1 % — SIGNIFICANT CHANGE UP (ref 2–14)
NEUTROPHILS # BLD AUTO: 6.74 K/UL — SIGNIFICANT CHANGE UP (ref 1.8–7.4)
NEUTROPHILS NFR BLD AUTO: 74.8 % — SIGNIFICANT CHANGE UP (ref 43–77)
PHOSPHATE SERPL-MCNC: 2.9 MG/DL — SIGNIFICANT CHANGE UP (ref 2.5–4.5)
PLATELET # BLD AUTO: 307 K/UL — SIGNIFICANT CHANGE UP (ref 150–400)
POTASSIUM SERPL-MCNC: 3.5 MMOL/L — SIGNIFICANT CHANGE UP (ref 3.5–5.3)
POTASSIUM SERPL-SCNC: 3.5 MMOL/L — SIGNIFICANT CHANGE UP (ref 3.5–5.3)
PROT SERPL-MCNC: 7.2 G/DL — SIGNIFICANT CHANGE UP (ref 6–8.3)
RBC # BLD: 3.94 M/UL — SIGNIFICANT CHANGE UP (ref 3.8–5.2)
RBC # FLD: 14.5 % — SIGNIFICANT CHANGE UP (ref 10.3–14.5)
SODIUM SERPL-SCNC: 140 MMOL/L — SIGNIFICANT CHANGE UP (ref 135–145)
SPECIMEN SOURCE: SIGNIFICANT CHANGE UP
TROPONIN T, HIGH SENSITIVITY RESULT: 26 NG/L — SIGNIFICANT CHANGE UP (ref 0–51)
WBC # BLD: 9.02 K/UL — SIGNIFICANT CHANGE UP (ref 3.8–10.5)
WBC # FLD AUTO: 9.02 K/UL — SIGNIFICANT CHANGE UP (ref 3.8–10.5)

## 2019-12-09 PROCEDURE — 93970 EXTREMITY STUDY: CPT | Mod: 26

## 2019-12-09 PROCEDURE — 99233 SBSQ HOSP IP/OBS HIGH 50: CPT | Mod: GC

## 2019-12-09 RX ADMIN — SPIRONOLACTONE 12.5 MILLIGRAM(S): 25 TABLET, FILM COATED ORAL at 05:41

## 2019-12-09 RX ADMIN — ISOSORBIDE MONONITRATE 60 MILLIGRAM(S): 60 TABLET, EXTENDED RELEASE ORAL at 13:39

## 2019-12-09 RX ADMIN — Medication 40 MILLIGRAM(S): at 18:13

## 2019-12-09 RX ADMIN — Medication 25 MILLIGRAM(S): at 05:41

## 2019-12-09 RX ADMIN — OXYCODONE HYDROCHLORIDE 10 MILLIGRAM(S): 5 TABLET ORAL at 05:41

## 2019-12-09 RX ADMIN — Medication 100 MILLIGRAM(S): at 22:29

## 2019-12-09 RX ADMIN — PREGABALIN 1000 MICROGRAM(S): 225 CAPSULE ORAL at 13:39

## 2019-12-09 RX ADMIN — ATORVASTATIN CALCIUM 10 MILLIGRAM(S): 80 TABLET, FILM COATED ORAL at 22:29

## 2019-12-09 RX ADMIN — Medication 650 MILLIGRAM(S): at 06:16

## 2019-12-09 RX ADMIN — OXYCODONE HYDROCHLORIDE 10 MILLIGRAM(S): 5 TABLET ORAL at 14:13

## 2019-12-09 RX ADMIN — OXYCODONE HYDROCHLORIDE 10 MILLIGRAM(S): 5 TABLET ORAL at 23:00

## 2019-12-09 RX ADMIN — APIXABAN 2.5 MILLIGRAM(S): 2.5 TABLET, FILM COATED ORAL at 18:13

## 2019-12-09 RX ADMIN — APIXABAN 2.5 MILLIGRAM(S): 2.5 TABLET, FILM COATED ORAL at 05:41

## 2019-12-09 RX ADMIN — OXYCODONE HYDROCHLORIDE 10 MILLIGRAM(S): 5 TABLET ORAL at 13:43

## 2019-12-09 RX ADMIN — OXYCODONE HYDROCHLORIDE 10 MILLIGRAM(S): 5 TABLET ORAL at 06:15

## 2019-12-09 RX ADMIN — Medication 1: at 13:40

## 2019-12-09 RX ADMIN — Medication 100 MILLIGRAM(S): at 05:41

## 2019-12-09 RX ADMIN — Medication 100 MILLIGRAM(S): at 13:43

## 2019-12-09 RX ADMIN — GABAPENTIN 300 MILLIGRAM(S): 400 CAPSULE ORAL at 13:43

## 2019-12-09 RX ADMIN — Medication 1000 UNIT(S): at 13:39

## 2019-12-09 RX ADMIN — OXYCODONE HYDROCHLORIDE 10 MILLIGRAM(S): 5 TABLET ORAL at 22:28

## 2019-12-09 RX ADMIN — Medication 40 MILLIGRAM(S): at 05:41

## 2019-12-09 NOTE — PROGRESS NOTE ADULT - PROBLEM SELECTOR PLAN 7
chronic RUE lympedema s/p R partial mastectomy for BCa in 1989  - continue to monitor  - avoid blood draws, BP check on RUE

## 2019-12-09 NOTE — PROGRESS NOTE ADULT - PROBLEM SELECTOR PLAN 4
Chronic R hip pain due to OA  - c/w home pain regimen oxycodone ER 10mg q8h  - oxycodone 5mg q4h PRN for severe pain. see ISTOP

## 2019-12-09 NOTE — PROGRESS NOTE ADULT - SUBJECTIVE AND OBJECTIVE BOX
[facility]  Internal Medicine PGY-1   [facility] Pager [phone]/ [phone]    NURIA GARCIA  79y  Female      Patient is a 79y old  Female who presents with a chief complaint of bilateral leg swelling, redness (08 Dec 2019 19:32)      INTERVAL HPI/OVERNIGHT EVENTS: Patient notes worsening bilateral swelling and redness over the course of 2 or 3 last days. No chest pain, SOB, good compliance with medications. She is unclear what precipitated this event. She denies recent travel or medication adjustments. Denies fever, chills, cough.       REVIEW OF SYSTEMS:  CONSTITUTIONAL: No fever or chills.  ENMT:  NO sore throat or sinus pain  RESPIRATORY: No cough, wheezing, chills.  No shortness of breath  CARDIOVASCULAR: No chest pain, palpitations, dizziness.  GASTROINTESTINAL: No abdominal pain or diarrhea.  GENITOURINARY: No dysuria  SKIN: Redness of bilateral shins along with swelling and pain  MUSCULOSKELETAL: CHronic pain in r hip. Lower extremity redness and swelling bilaterally    FAMILY HISTORY:  Family history of heart attack (Child)  Family history of diabetes mellitus in son  Family history of diabetes mellitus in mother      Vital Signs Last 24 Hrs  T(C): 36.9 (09 Dec 2019 07:20), Max: 37.6 (09 Dec 2019 03:11)  T(F): 98.4 (09 Dec 2019 07:20), Max: 99.7 (09 Dec 2019 03:11)  HR: 69 (09 Dec 2019 07:20) (60 - 88)  BP: 112/61 (09 Dec 2019 07:20) (112/61 - 177/79)  BP(mean): --  RR: 18 (09 Dec 2019 07:20) (18 - 21)  SpO2: 96% (09 Dec 2019 07:20) (95% - 99%)  CT scan dye (Hives)  penicillin (Angioedema)      PHYSICAL EXAM:  GENERAL: NAD, well-groomed, well-developed, obese  HEAD:  Atraumatic, Normocephalic  EYES: EOMI  ENMT: Moist mucous membranes. NO lesions appreciated  NECK: Supple  NERVOUS SYSTEM:  Alert & Oriented X3, Good concentration; Motor Strength 5/5 B/L upper and lower extremities  CHEST/LUNG: Clear to percussion bilaterally; No rales, rhonchi, wheezing, or rubs appreciated  HEART: Regular rate and rhythm; No murmurs, rubs, or gallops  ABDOMEN: obese, soft, without tenderness, active bowel sounds. Some scars well healed from previous sx.  EXTREMITIES:  2+ Peripheral Pulses, Warm, well perfused. Lower extremity swelling bilaterally along with erythema and overlying likely chronic skin changes.   SKIN: chronic venous stasis/edematous skin changes in lower extremities.     Consultant(s) Notes Reviewed:  [x ] YES  [ ] NO  Care Discussed with Consultants/Other Providers [ x] YES  [ ] NO    LABS:                        11.7   9.02  )-----------( 307      ( 09 Dec 2019 07:50 )             37.5   12-09    140  |  100  |  28<H>  ----------------------------<  135<H>  3.5   |  27  |  1.77<H>    Ca    8.6      09 Dec 2019 06:51  Phos  2.9     12-09  Mg     2.0     12-09    TPro  7.2  /  Alb  3.9  /  TBili  0.4  /  DBili  x   /  AST  16  /  ALT  10  /  AlkPhos  69  12-09      RADIOLOGY & ADDITIONAL TESTS:    Imaging Personally Reviewed:  [ ] YES  [ ] NO  acetaminophen   Tablet .. 650 milliGRAM(s) Oral every 6 hours PRN  apixaban 2.5 milliGRAM(s) Oral every 12 hours  atorvastatin 10 milliGRAM(s) Oral at bedtime  cholecalciferol 1000 Unit(s) Oral daily  cyanocobalamin 1000 MICROGram(s) Oral daily  dextrose 40% Gel 15 Gram(s) Oral once PRN  dextrose 5%. 1000 milliLiter(s) IV Continuous <Continuous>  dextrose 50% Injectable 12.5 Gram(s) IV Push once  dextrose 50% Injectable 25 Gram(s) IV Push once  dextrose 50% Injectable 25 Gram(s) IV Push once  furosemide   Injectable 40 milliGRAM(s) IV Push every 12 hours  gabapentin 300 milliGRAM(s) Oral daily  glucagon  Injectable 1 milliGRAM(s) IntraMuscular once PRN  hydrALAZINE 100 milliGRAM(s) Oral every 8 hours  insulin lispro (HumaLOG) corrective regimen sliding scale   SubCutaneous three times a day before meals  insulin lispro (HumaLOG) corrective regimen sliding scale   SubCutaneous at bedtime  isosorbide   mononitrate ER Tablet (IMDUR) 60 milliGRAM(s) Oral daily  metoprolol succinate ER 25 milliGRAM(s) Oral daily  oxyCODONE    IR 5 milliGRAM(s) Oral every 4 hours PRN  oxyCODONE  ER Tablet 10 milliGRAM(s) Oral every 8 hours  spironolactone 12.5 milliGRAM(s) Oral daily      HEALTH ISSUES - PROBLEM Dx:  Discharge planning issues: Discharge planning issues  Prophylactic measure: Prophylactic measure  DVT (deep venous thrombosis): DVT (deep venous thrombosis)  Lymphedema of arm: Lymphedema of arm  Type II diabetes mellitus: Type II diabetes mellitus  CKD (chronic kidney disease), stage III: CKD (chronic kidney disease), stage III  Hip pain, chronic, right: Hip pain, chronic, right  Hypertension: Hypertension  CAD (coronary artery disease): CAD (coronary artery disease)  Acute on chronic diastolic congestive heart failure: Acute on chronic diastolic congestive heart failure

## 2019-12-09 NOTE — PROGRESS NOTE ADULT - ASSESSMENT
80 y/o Czech speaking F PMHx of HFpEF (EF 60-65%, stage II diastolic dysfunction), CAD (s/p NST showing areas of infarct, no prior caths), CKD (Stage III), breast cancer (s/p R mastectomy in 1989, c/b chronic RUE lymphedema, rectal cancer (s/p reversed ileostomy in 2015), RLE DVT (2/2019, on Eliquis), chronic R hip pain, T2DM (A1C 6.7 in 4/2019), HTN, and HLD, presented with bilateral LE swelling and redness, concern for CHF exacerbation.

## 2019-12-09 NOTE — PROGRESS NOTE ADULT - PROBLEM SELECTOR PLAN 10
Transitions of Care Status:  1.  Name of PCP: Dr. Sahni  2.  PCP Contacted on Admission: [ ] Y    [ ] N    3.  PCP contacted at Discharge: [ ] Y    [ ] N    [ ] N/A  4.  Post-Discharge Appointment Date and Location:  5.  Summary of Handoff given to PCP:

## 2019-12-09 NOTE — PROGRESS NOTE ADULT - PROBLEM SELECTOR PLAN 1
Presenting with worsening b/l LE swelling and redness, concern for mild CHF exacerbation. Lower suspicion for b/l LE cellulitis, redness improves with leg raise. No pulmonary edema on CXR. Satting well on RA. NST in 05/2019 showed one fixed defect and one reversible defect consistent with areas of infarct, was not a candidate for LHC given renal dysfunction.  - check TTE to assess for new segmental wall motion abnormalities  - diurese with Lasix 60mg IV BID  - daily weights, strict I/Os  - check trop, likely to be elevated in setting of renal dysfunction  - Lower extremity swelling likely secondary to CHF, get doppler US to R/O new dvt

## 2019-12-09 NOTE — PROGRESS NOTE ADULT - PROBLEM SELECTOR PLAN 8
RLE DVT in 02/2019, on renally dosed Eliquis  - c/w Eliquis 2.5mg BID  - will also check BLE VA duplex to assess for changes in known lower extremity thrombus

## 2019-12-09 NOTE — PROGRESS NOTE ADULT - ATTENDING COMMENTS
Patient seen and examined.  Acute on chronic diastlic heart failure with LE edema, chronic venous stasis changes, exam is not c/w an infection.  Diurese, I's and O's, daily weights.

## 2019-12-09 NOTE — PROGRESS NOTE ADULT - PROBLEM SELECTOR PLAN 2
NST (05/2019) showed areas of infarct, no hx of LHC, not a candidate due to CKD.  - EKG no acute changes  - no active CP  - check trop

## 2019-12-10 LAB
ANION GAP SERPL CALC-SCNC: 15 MMOL/L — SIGNIFICANT CHANGE UP (ref 5–17)
BUN SERPL-MCNC: 27 MG/DL — HIGH (ref 7–23)
CALCIUM SERPL-MCNC: 9.1 MG/DL — SIGNIFICANT CHANGE UP (ref 8.4–10.5)
CHLORIDE SERPL-SCNC: 100 MMOL/L — SIGNIFICANT CHANGE UP (ref 96–108)
CO2 SERPL-SCNC: 26 MMOL/L — SIGNIFICANT CHANGE UP (ref 22–31)
CREAT SERPL-MCNC: 1.94 MG/DL — HIGH (ref 0.5–1.3)
GLUCOSE BLDC GLUCOMTR-MCNC: 125 MG/DL — HIGH (ref 70–99)
GLUCOSE BLDC GLUCOMTR-MCNC: 138 MG/DL — HIGH (ref 70–99)
GLUCOSE BLDC GLUCOMTR-MCNC: 200 MG/DL — HIGH (ref 70–99)
GLUCOSE BLDC GLUCOMTR-MCNC: 212 MG/DL — HIGH (ref 70–99)
GLUCOSE SERPL-MCNC: 99 MG/DL — SIGNIFICANT CHANGE UP (ref 70–99)
HCT VFR BLD CALC: 33.5 % — LOW (ref 34.5–45)
HGB BLD-MCNC: 10.3 G/DL — LOW (ref 11.5–15.5)
MAGNESIUM SERPL-MCNC: 1.9 MG/DL — SIGNIFICANT CHANGE UP (ref 1.6–2.6)
MCHC RBC-ENTMCNC: 30.1 PG — SIGNIFICANT CHANGE UP (ref 27–34)
MCHC RBC-ENTMCNC: 30.7 GM/DL — LOW (ref 32–36)
MCV RBC AUTO: 98 FL — SIGNIFICANT CHANGE UP (ref 80–100)
PHOSPHATE SERPL-MCNC: 3.3 MG/DL — SIGNIFICANT CHANGE UP (ref 2.5–4.5)
PLATELET # BLD AUTO: 258 K/UL — SIGNIFICANT CHANGE UP (ref 150–400)
POTASSIUM SERPL-MCNC: 3.4 MMOL/L — LOW (ref 3.5–5.3)
POTASSIUM SERPL-SCNC: 3.4 MMOL/L — LOW (ref 3.5–5.3)
RBC # BLD: 3.42 M/UL — LOW (ref 3.8–5.2)
RBC # FLD: 14.7 % — HIGH (ref 10.3–14.5)
SODIUM SERPL-SCNC: 141 MMOL/L — SIGNIFICANT CHANGE UP (ref 135–145)
WBC # BLD: 7.66 K/UL — SIGNIFICANT CHANGE UP (ref 3.8–10.5)
WBC # FLD AUTO: 7.66 K/UL — SIGNIFICANT CHANGE UP (ref 3.8–10.5)

## 2019-12-10 PROCEDURE — 93306 TTE W/DOPPLER COMPLETE: CPT | Mod: 26

## 2019-12-10 PROCEDURE — 99233 SBSQ HOSP IP/OBS HIGH 50: CPT | Mod: GC

## 2019-12-10 RX ORDER — ERGOCALCIFEROL 1.25 MG/1
50000 CAPSULE ORAL
Refills: 0 | Status: DISCONTINUED | OUTPATIENT
Start: 2019-12-10 | End: 2019-12-13

## 2019-12-10 RX ORDER — POTASSIUM CHLORIDE 20 MEQ
20 PACKET (EA) ORAL ONCE
Refills: 0 | Status: COMPLETED | OUTPATIENT
Start: 2019-12-10 | End: 2019-12-10

## 2019-12-10 RX ORDER — FUROSEMIDE 40 MG
60 TABLET ORAL EVERY 12 HOURS
Refills: 0 | Status: DISCONTINUED | OUTPATIENT
Start: 2019-12-10 | End: 2019-12-11

## 2019-12-10 RX ADMIN — SPIRONOLACTONE 12.5 MILLIGRAM(S): 25 TABLET, FILM COATED ORAL at 05:32

## 2019-12-10 RX ADMIN — OXYCODONE HYDROCHLORIDE 5 MILLIGRAM(S): 5 TABLET ORAL at 19:58

## 2019-12-10 RX ADMIN — ISOSORBIDE MONONITRATE 60 MILLIGRAM(S): 60 TABLET, EXTENDED RELEASE ORAL at 12:51

## 2019-12-10 RX ADMIN — Medication 100 MILLIGRAM(S): at 22:22

## 2019-12-10 RX ADMIN — OXYCODONE HYDROCHLORIDE 10 MILLIGRAM(S): 5 TABLET ORAL at 05:32

## 2019-12-10 RX ADMIN — Medication 60 MILLIGRAM(S): at 17:33

## 2019-12-10 RX ADMIN — OXYCODONE HYDROCHLORIDE 5 MILLIGRAM(S): 5 TABLET ORAL at 19:28

## 2019-12-10 RX ADMIN — Medication 25 MILLIGRAM(S): at 05:32

## 2019-12-10 RX ADMIN — OXYCODONE HYDROCHLORIDE 10 MILLIGRAM(S): 5 TABLET ORAL at 22:22

## 2019-12-10 RX ADMIN — OXYCODONE HYDROCHLORIDE 5 MILLIGRAM(S): 5 TABLET ORAL at 11:38

## 2019-12-10 RX ADMIN — OXYCODONE HYDROCHLORIDE 5 MILLIGRAM(S): 5 TABLET ORAL at 12:38

## 2019-12-10 RX ADMIN — ERGOCALCIFEROL 50000 UNIT(S): 1.25 CAPSULE ORAL at 14:09

## 2019-12-10 RX ADMIN — Medication 100 MILLIGRAM(S): at 14:10

## 2019-12-10 RX ADMIN — OXYCODONE HYDROCHLORIDE 10 MILLIGRAM(S): 5 TABLET ORAL at 14:10

## 2019-12-10 RX ADMIN — APIXABAN 2.5 MILLIGRAM(S): 2.5 TABLET, FILM COATED ORAL at 05:32

## 2019-12-10 RX ADMIN — Medication 40 MILLIGRAM(S): at 05:32

## 2019-12-10 RX ADMIN — Medication 2: at 12:01

## 2019-12-10 RX ADMIN — Medication 20 MILLIEQUIVALENT(S): at 11:38

## 2019-12-10 RX ADMIN — PREGABALIN 1000 MICROGRAM(S): 225 CAPSULE ORAL at 12:51

## 2019-12-10 RX ADMIN — GABAPENTIN 300 MILLIGRAM(S): 400 CAPSULE ORAL at 12:51

## 2019-12-10 RX ADMIN — ATORVASTATIN CALCIUM 10 MILLIGRAM(S): 80 TABLET, FILM COATED ORAL at 22:21

## 2019-12-10 RX ADMIN — APIXABAN 2.5 MILLIGRAM(S): 2.5 TABLET, FILM COATED ORAL at 17:33

## 2019-12-10 RX ADMIN — OXYCODONE HYDROCHLORIDE 10 MILLIGRAM(S): 5 TABLET ORAL at 06:10

## 2019-12-10 RX ADMIN — Medication 100 MILLIGRAM(S): at 05:32

## 2019-12-10 RX ADMIN — OXYCODONE HYDROCHLORIDE 10 MILLIGRAM(S): 5 TABLET ORAL at 15:10

## 2019-12-10 RX ADMIN — OXYCODONE HYDROCHLORIDE 10 MILLIGRAM(S): 5 TABLET ORAL at 22:55

## 2019-12-10 NOTE — PROGRESS NOTE ADULT - PROBLEM SELECTOR PLAN 1
Presenting with worsening b/l LE swelling and redness, concern for mild CHF exacerbation. Lower suspicion for b/l LE cellulitis, redness improves with leg raise. No pulmonary edema on CXR. Satting well on RA. NST in 05/2019 showed one fixed defect and one reversible defect consistent with areas of infarct, was not a candidate for LHC given renal dysfunction.  - check TTE to assess for new segmental wall motion abnormalities  - diurese with Lasix 60mg IV BID  - daily weights, strict I/Os  - check trop, likely to be elevated in setting of renal dysfunction  - Lower extremity swelling likely secondary to CHF. Dopplers negative bilaterally. Presenting with worsening b/l LE swelling and redness, concern for mild CHF exacerbation. Lower suspicion for b/l LE cellulitis, redness improves with leg raise. No pulmonary edema on CXR. Satting well on RA. NST in 05/2019 showed one fixed defect and one reversible defect consistent with areas of infarct, was not a candidate for LHC given renal dysfunction.  - TTE showing progression of to moderate aortic stenosis from mild AS from last ECHO (12/2018). Will consider structural heart consult, may benefit from TAVR if symptoms are recurrent.   - diurese with Lasix 60mg IV BID  - daily weights, strict I/Os  - check trop, likely to be elevated in setting of renal dysfunction  - Lower extremity swelling likely secondary to CHF. Dopplers negative bilaterally. Presenting with worsening b/l LE swelling and redness, concern for mild CHF exacerbation. Lower suspicion for b/l LE cellulitis, redness improves with leg raise. No pulmonary edema on CXR. Satting well on RA. NST in 05/2019 showed one fixed defect and one reversible defect consistent with areas of infarct, was not a candidate for LHC given renal dysfunction.  - TTE showing progression of to moderate aortic stenosis from mild AS from last ECHO (12/2018). Will consider structural heart consult, may benefit from TAVR if symptoms are recurrent.   - diurese with Lasix 60mg IV BID  - daily weights, strict I/Os  - check trop, likely to be elevated in setting of renal dysfunction  - Lower extremity swelling likely secondary to CHF or progression of AS. Dopplers negative bilaterally.

## 2019-12-10 NOTE — PROGRESS NOTE ADULT - PROBLEM SELECTOR PLAN 6
INDICATIONS:                                                    Is a pregnancy test required: Yes.  Was it positive or negative?  Negative  Was a consent obtained?  Yes    Today's PHQ-2 Score:   PHQ-2 ( 1999 [employer]) 2/24/2019   Q1: Little interest or pleasure in doing things 1   Q2: Feeling down, depressed or hopeless 0   PHQ-2 Score 1   Q1: Little interest or pleasure in doing things Several days   Q2: Feeling down, depressed or hopeless Not at all   PHQ-2 Score 1         Shelby Asencio, is a 48 year old female, who had a recent negative pap.  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  Yes prior history of abnormal pap. Had LSIL in 2012 and then normal pap but HPV pos in 2013. had colpo with BX that showed no more than KENDY I. Then in 2014 had NIL pap and HPV neg.  Patient is on humira for her sjogrens as of the last year. With her  10 yrs already. Did take a break and she dated someone else in 2011/2012 or so. Here today for colposcopy. Discussed indication, risks of infection and bleeding.    Her last pap was   Lab Results   Component Value Date    PAP NIL 02/27/2019    .    PROCEDURE:                                                      Cervix is stained with acetic acid and viewed colposcopically. Squamocolumnar junction is visualized in it's entirety. acetowhite lesion(s) noted at 10-11 o'clock . Biopsy done Yes. Endocervical curretage Not Done           POST PROCEDURE:                                                      IMPRESSION: Patient tolerated procedure well and colposcopy adequate    PLAN : Await the results of the biopsies.   She  tolerated the procedure well with minimal discomfort. There were no complications. Has 2 black dots noted so unclear what those are. bx incorporated part of the lower one. There general face of the cervix was just very polypoid and irregular looking but AWE only at 10-11. Patient was discharged in stable condition.    Patient advised to call the clinic if excessive bleeding, pelvic  pain, or fever.     Follow-up plan based on pathology results.    In addition to the colpo the patient was just on vacation and in a hot tub a lot and swimming a lot and has a lot of itching and irritation. Labia are erythematous and mucous/white vaginal d/c. No gram stain or cx done but clinically appears to be yeast so rx for diflucan was sent in.    Also patient is still having night sweats. Doing a low dose ocp for cycle control b/c HRT wasn't givning her cycle control. Then added 0.5mg estradiol daily to see If that would help and it's not. At this point will increase her to 1mg estradiol on top of her ocp. If not better in 1-2 months will likely need to go off ocps and do cyclic HRT +/- with testosterone. The ocp itself could be causing night sweats b/c of signficant hormonal axis suppression.      Teresa Longoria MD     On Januvia and glimepiride at home, hold for now  - start sliding scale lispro  - fs qac/qhs

## 2019-12-10 NOTE — PHYSICAL THERAPY INITIAL EVALUATION ADULT - PRECAUTIONS/LIMITATIONS, REHAB EVAL
presented with bilateral LE swelling and redness, concern for CHF exacerbation. Pt notes improvement in swelling. Notes improvement in lower extremity pain. No fevers, chills, SOB. No pulmonary edema on CXR. Satting well on RA. TTE showing progression of to moderate aortic stenosis from mild AS from last ECHO (12/2018). Lower extremity swelling likely secondary to CHF or progression of AS. Dopplers negative bilaterally. EKG no acute changes presented with bilateral LE swelling and redness, concern for CHF exacerbation. Pt notes improvement in swelling. Notes improvement in lower extremity pain. No fevers, chills, SOB. No pulmonary edema on CXR. Satting well on RA. TTE showing progression of to moderate aortic stenosis from mild AS from last ECHO (12/2018). Lower extremity swelling likely secondary to CHF or progression of AS. Dopplers negative bilaterally. EKG no acute changes/fall precautions CONTINUED: presented with bilateral LE swelling and redness, concern for CHF exacerbation. Pt notes improvement in swelling. Notes improvement in lower extremity pain. No fevers, chills, SOB. No pulmonary edema on CXR. Satting well on RA. TTE showing progression of to moderate aortic stenosis from mild AS from last ECHO (12/2018). Lower extremity swelling likely secondary to CHF or progression of AS. Dopplers negative bilaterally. EKG no acute changes/fall precautions

## 2019-12-10 NOTE — PHYSICAL THERAPY INITIAL EVALUATION ADULT - ACTIVE RANGE OF MOTION EXAMINATION, REHAB EVAL
Left LE Active ROM was WFL (within functional limits)/Right UE Active ROM was WFL (within functional limits)/Right LE Active ROM was WFL (within functional limits)/+swelling RUE, pain BLE/Left UE Active ROM was WFL (within functional limits)

## 2019-12-10 NOTE — PHYSICAL THERAPY INITIAL EVALUATION ADULT - ADDITIONAL COMMENTS
pt lives alone in home, +3 steps to enter, no stairs inside, PTA amb with rolling walker, assist as needed from dtr (lives nearby) and son for ADLs, +walk in shower, no shower chair, dtr assists with groceries

## 2019-12-10 NOTE — PHYSICAL THERAPY INITIAL EVALUATION ADULT - ASR WT BEARING STATUS EVAL
no weight-bearing restrictions no weight-bearing restrictions/CONTINUED: US TTE: No Pericardial Effusion.; CXR: clear lungs.; VA Duplex b/l LE: No evidence of deep venous thrombosis in either lower extremity. Left Baker's cyst.; TTE: Mitral annular calcification, otherwise normal mitral valve. Mild mitral regurgitation. Calcified trileaflet aortic valve with decreased opening. Peak transaortic valve gradient equals 18 mm Hg, mean transaortic valve gradient equals 12 mm Hg, estimatedaortic valve area equals 1.4 sqcm (by continuity equation), aortic valve velocity time integral equals 46 cm, consistent with moderate aortic stenosis. No aortic valve regurgitation seen. Normal left ventricular systolic function. No segmental wall motion abnormalities. Moderate diastolic dysfunction (Stage II). Normal right ventricular size and function.*** Compared with echocardiogram of 12/10/2018, there has been interval progression of the aortic valve disease.

## 2019-12-10 NOTE — PROGRESS NOTE ADULT - ASSESSMENT
78 y/o Portuguese speaking F PMHx of HFpEF (EF 60-65%, stage II diastolic dysfunction), CAD (s/p NST showing areas of infarct, no prior caths), CKD (Stage III), breast cancer (s/p R mastectomy in 1989, c/b chronic RUE lymphedema, rectal cancer (s/p reversed ileostomy in 2015), RLE DVT (2/2019, on Eliquis), chronic R hip pain, T2DM (A1C 6.7 in 4/2019), HTN, and HLD, presented with bilateral LE swelling and redness, concern for CHF exacerbation.

## 2019-12-10 NOTE — PHYSICAL THERAPY INITIAL EVALUATION ADULT - GENERAL OBSERVATIONS, REHAB EVAL
Pt rec'd semi supine in bed pre medicated, reports pain in RLE, +Gabonese speaking,  services use t/o ID#[id number]. RN student in room, vitals input by student see flowsheet. Pt willing to work with PT.

## 2019-12-10 NOTE — PROGRESS NOTE ADULT - SUBJECTIVE AND OBJECTIVE BOX
[facility]  Internal Medicine PGY-1   [facility] Pager [phone]/ [phone]    NURIA GARCIA  79y  Female      Patient is a 79y old  Female who presents with a chief complaint of bilateral leg swelling, redness (08 Dec 2019 19:32)      INTERVAL HPI/OVERNIGHT EVENTS: Patient notes improvement in swelling. Notes improvement in lower extremity pain. No fevers, chills, SOB.      REVIEW OF SYSTEMS:  CONSTITUTIONAL: No fever or chills.  ENMT:  NO sore throat or sinus pain  RESPIRATORY: No cough, wheezing, chills.  No shortness of breath  CARDIOVASCULAR: No chest pain, palpitations, dizziness.  GASTROINTESTINAL: No abdominal pain or diarrhea.  GENITOURINARY: No dysuria  SKIN: Redness of bilateral shins along with swelling and pain  MUSCULOSKELETAL: CHronic pain in r hip. Lower extremity redness and swelling bilaterally    FAMILY HISTORY:  Family history of heart attack (Child)  Family history of diabetes mellitus in son  Family history of diabetes mellitus in mother      Vital Signs Last 24 Hrs  T(C): 36.8 (10 Dec 2019 05:30), Max: 36.8 (09 Dec 2019 13:36)  T(F): 98.2 (10 Dec 2019 05:30), Max: 98.3 (09 Dec 2019 15:36)  HR: 75 (10 Dec 2019 05:30) (72 - 79)  BP: 122/63 (10 Dec 2019 05:30) (122/63 - 146/72)  BP(mean): --  RR: 18 (10 Dec 2019 05:30) (18 - 19)  SpO2: 93% (10 Dec 2019 05:30) (93% - 96%)  CT scan dye (Hives)  penicillin (Angioedema)      PHYSICAL EXAM:  GENERAL: NAD, well-groomed, well-developed, obese  HEAD:  Atraumatic, Normocephalic  EYES: EOMI  ENMT: Moist mucous membranes. NO lesions appreciated  NECK: Supple  NERVOUS SYSTEM:  Alert & Oriented X3, Good concentration; Motor Strength 5/5 B/L upper and lower extremities  CHEST/LUNG: Clear to percussion bilaterally; No rales, rhonchi, wheezing, or rubs appreciated  HEART: Regular rate and rhythm; No murmurs, rubs, or gallops  ABDOMEN: obese, soft, without tenderness, active bowel sounds. Some scars well healed from previous sx.  EXTREMITIES:  2+ Peripheral Pulses, Warm, well perfused. Lower extremity swelling bilaterally along with erythema and overlying likely chronic skin changes.   SKIN: chronic venous stasis/edematous skin changes in lower extremities.     Consultant(s) Notes Reviewed:  [x ] YES  [ ] NO  Care Discussed with Consultants/Other Providers [ x] YES  [ ] NO    LABS:                                   11.7   9.02  )-----------( 307      ( 09 Dec 2019 07:50 )             37.5   12-10    141  |  100  |  27<H>  ----------------------------<  99  3.4<L>   |  26  |  1.94<H>    Ca    9.1      10 Dec 2019 06:37  Phos  3.3     12-10  Mg     1.9     12-10    TPro  7.2  /  Alb  3.9  /  TBili  0.4  /  DBili  x   /  AST  16  /  ALT  10  /  AlkPhos  69  12-09        RADIOLOGY & ADDITIONAL TESTS:    Imaging Personally Reviewed:  [ ] YES  [ ] NO  acetaminophen   Tablet .. 650 milliGRAM(s) Oral every 6 hours PRN  apixaban 2.5 milliGRAM(s) Oral every 12 hours  atorvastatin 10 milliGRAM(s) Oral at bedtime  cholecalciferol 1000 Unit(s) Oral daily  cyanocobalamin 1000 MICROGram(s) Oral daily  dextrose 40% Gel 15 Gram(s) Oral once PRN  dextrose 5%. 1000 milliLiter(s) IV Continuous <Continuous>  dextrose 50% Injectable 12.5 Gram(s) IV Push once  dextrose 50% Injectable 25 Gram(s) IV Push once  dextrose 50% Injectable 25 Gram(s) IV Push once  furosemide   Injectable 40 milliGRAM(s) IV Push every 12 hours  gabapentin 300 milliGRAM(s) Oral daily  glucagon  Injectable 1 milliGRAM(s) IntraMuscular once PRN  hydrALAZINE 100 milliGRAM(s) Oral every 8 hours  insulin lispro (HumaLOG) corrective regimen sliding scale   SubCutaneous three times a day before meals  insulin lispro (HumaLOG) corrective regimen sliding scale   SubCutaneous at bedtime  isosorbide   mononitrate ER Tablet (IMDUR) 60 milliGRAM(s) Oral daily  metoprolol succinate ER 25 milliGRAM(s) Oral daily  oxyCODONE    IR 5 milliGRAM(s) Oral every 4 hours PRN  oxyCODONE  ER Tablet 10 milliGRAM(s) Oral every 8 hours  spironolactone 12.5 milliGRAM(s) Oral daily      HEALTH ISSUES - PROBLEM Dx:  Discharge planning issues: Discharge planning issues  Prophylactic measure: Prophylactic measure  DVT (deep venous thrombosis): DVT (deep venous thrombosis)  Lymphedema of arm: Lymphedema of arm  Type II diabetes mellitus: Type II diabetes mellitus  CKD (chronic kidney disease), stage III: CKD (chronic kidney disease), stage III  Hip pain, chronic, right: Hip pain, chronic, right  Hypertension: Hypertension  CAD (coronary artery disease): CAD (coronary artery disease)  Acute on chronic diastolic congestive heart failure: Acute on chronic diastolic congestive heart failure

## 2019-12-10 NOTE — PHYSICAL THERAPY INITIAL EVALUATION ADULT - PERTINENT HX OF CURRENT PROBLEM, REHAB EVAL
Pt is a 80 y/o Azeri speaking F admitted to [facility] on 12/8/19 for PMHx of HFpEF (EF 60-65%, stage II diastolic dysfunction), CAD (s/p NST showing areas of infarct, no prior caths), CKD (Stage III), breast cancer (s/p R mastectomy in 1989, c/b chronic RUE lymphedema, rectal cancer (s/p reversed ileostomy in 2015), RLE DVT (2/2019, on Eliquis), chronic R hip pain, T2DM (A1C 6.7 in 4/2019), HTN, and HLD,

## 2019-12-10 NOTE — PHYSICAL THERAPY INITIAL EVALUATION ADULT - PLANNED THERAPY INTERVENTIONS, PT EVAL
gait training/stair neg: GOAL: pt will neg 3 steps to enter home with cgx1 in 2wks./bed mobility training/transfer training

## 2019-12-10 NOTE — PHYSICAL THERAPY INITIAL EVALUATION ADULT - DISCHARGE DISPOSITION, PT EVAL
TBD pending completion of eval subacute rehab. pt requests home. If pt dc home, pt will require assist for all functional mobility and home PT for progressive strengthening, Gait training, and balance./rehabilitation facility

## 2019-12-11 ENCOUNTER — TRANSCRIPTION ENCOUNTER (OUTPATIENT)
Age: 79
End: 2019-12-11

## 2019-12-11 LAB
ANION GAP SERPL CALC-SCNC: 14 MMOL/L — SIGNIFICANT CHANGE UP (ref 5–17)
BUN SERPL-MCNC: 33 MG/DL — HIGH (ref 7–23)
CALCIUM SERPL-MCNC: 9.1 MG/DL — SIGNIFICANT CHANGE UP (ref 8.4–10.5)
CHLORIDE SERPL-SCNC: 92 MMOL/L — LOW (ref 96–108)
CO2 SERPL-SCNC: 32 MMOL/L — HIGH (ref 22–31)
CREAT SERPL-MCNC: 2.07 MG/DL — HIGH (ref 0.5–1.3)
GLUCOSE BLDC GLUCOMTR-MCNC: 129 MG/DL — HIGH (ref 70–99)
GLUCOSE BLDC GLUCOMTR-MCNC: 191 MG/DL — HIGH (ref 70–99)
GLUCOSE BLDC GLUCOMTR-MCNC: 192 MG/DL — HIGH (ref 70–99)
GLUCOSE BLDC GLUCOMTR-MCNC: 239 MG/DL — HIGH (ref 70–99)
GLUCOSE SERPL-MCNC: 194 MG/DL — HIGH (ref 70–99)
POTASSIUM SERPL-MCNC: 3.3 MMOL/L — LOW (ref 3.5–5.3)
POTASSIUM SERPL-SCNC: 3.3 MMOL/L — LOW (ref 3.5–5.3)
SODIUM SERPL-SCNC: 138 MMOL/L — SIGNIFICANT CHANGE UP (ref 135–145)

## 2019-12-11 PROCEDURE — 99233 SBSQ HOSP IP/OBS HIGH 50: CPT | Mod: GC

## 2019-12-11 RX ORDER — POTASSIUM CHLORIDE 20 MEQ
40 PACKET (EA) ORAL ONCE
Refills: 0 | Status: COMPLETED | OUTPATIENT
Start: 2019-12-11 | End: 2019-12-11

## 2019-12-11 RX ORDER — BUMETANIDE 0.25 MG/ML
1 INJECTION INTRAMUSCULAR; INTRAVENOUS
Refills: 0 | Status: DISCONTINUED | OUTPATIENT
Start: 2019-12-11 | End: 2019-12-13

## 2019-12-11 RX ADMIN — Medication 100 MILLIGRAM(S): at 22:19

## 2019-12-11 RX ADMIN — OXYCODONE HYDROCHLORIDE 5 MILLIGRAM(S): 5 TABLET ORAL at 11:33

## 2019-12-11 RX ADMIN — OXYCODONE HYDROCHLORIDE 10 MILLIGRAM(S): 5 TABLET ORAL at 06:50

## 2019-12-11 RX ADMIN — OXYCODONE HYDROCHLORIDE 10 MILLIGRAM(S): 5 TABLET ORAL at 22:19

## 2019-12-11 RX ADMIN — PREGABALIN 1000 MICROGRAM(S): 225 CAPSULE ORAL at 11:41

## 2019-12-11 RX ADMIN — APIXABAN 2.5 MILLIGRAM(S): 2.5 TABLET, FILM COATED ORAL at 17:40

## 2019-12-11 RX ADMIN — ATORVASTATIN CALCIUM 10 MILLIGRAM(S): 80 TABLET, FILM COATED ORAL at 22:19

## 2019-12-11 RX ADMIN — OXYCODONE HYDROCHLORIDE 10 MILLIGRAM(S): 5 TABLET ORAL at 22:50

## 2019-12-11 RX ADMIN — OXYCODONE HYDROCHLORIDE 10 MILLIGRAM(S): 5 TABLET ORAL at 14:02

## 2019-12-11 RX ADMIN — OXYCODONE HYDROCHLORIDE 10 MILLIGRAM(S): 5 TABLET ORAL at 15:02

## 2019-12-11 RX ADMIN — Medication 100 MILLIGRAM(S): at 05:49

## 2019-12-11 RX ADMIN — Medication 60 MILLIGRAM(S): at 05:51

## 2019-12-11 RX ADMIN — OXYCODONE HYDROCHLORIDE 10 MILLIGRAM(S): 5 TABLET ORAL at 06:11

## 2019-12-11 RX ADMIN — OXYCODONE HYDROCHLORIDE 5 MILLIGRAM(S): 5 TABLET ORAL at 10:33

## 2019-12-11 RX ADMIN — BUMETANIDE 1 MILLIGRAM(S): 0.25 INJECTION INTRAMUSCULAR; INTRAVENOUS at 17:40

## 2019-12-11 RX ADMIN — GABAPENTIN 300 MILLIGRAM(S): 400 CAPSULE ORAL at 11:42

## 2019-12-11 RX ADMIN — SPIRONOLACTONE 12.5 MILLIGRAM(S): 25 TABLET, FILM COATED ORAL at 05:50

## 2019-12-11 RX ADMIN — ISOSORBIDE MONONITRATE 60 MILLIGRAM(S): 60 TABLET, EXTENDED RELEASE ORAL at 11:42

## 2019-12-11 RX ADMIN — Medication 2: at 08:45

## 2019-12-11 RX ADMIN — APIXABAN 2.5 MILLIGRAM(S): 2.5 TABLET, FILM COATED ORAL at 05:49

## 2019-12-11 RX ADMIN — Medication 100 MILLIGRAM(S): at 14:01

## 2019-12-11 RX ADMIN — Medication 40 MILLIEQUIVALENT(S): at 17:41

## 2019-12-11 RX ADMIN — Medication 1: at 17:41

## 2019-12-11 RX ADMIN — Medication 25 MILLIGRAM(S): at 05:49

## 2019-12-11 NOTE — PROGRESS NOTE ADULT - SUBJECTIVE AND OBJECTIVE BOX
[facility]  Internal Medicine PGY-1   [facility] Pager [phone]/ [phone]    NURIA GARCIA  79y  Female      Patient is a 79y old  Female who presents with a chief complaint of bilateral leg swelling, redness (08 Dec 2019 19:32)      INTERVAL HPI/OVERNIGHT EVENTS: Patient notes improvement in swelling and redness. Notes improvement in lower extremity pain, but still having pain in the left leg. No fevers or chills overnight.       REVIEW OF SYSTEMS:  CONSTITUTIONAL: No fever or chills.  RESPIRATORY: No cough, chills  No shortness of breath  CARDIOVASCULAR: No chest pain, palpitations, dizziness.  GASTROINTESTINAL: No abdominal pain or diarrhea.  GENITOURINARY: No dysuria  SKIN: Redness of bilateral shins along with swelling and pain  MUSCULOSKELETAL: Chronic pain in r hip. Lower extremity redness and swelling bilaterally    FAMILY HISTORY:  Family history of heart attack (Child)  Family history of diabetes mellitus in son  Family history of diabetes mellitus in mother    Vital Signs Last 24 Hrs  T(C): 36.8 (11 Dec 2019 05:45), Max: 37.5 (10 Dec 2019 14:08)  T(F): 98.2 (11 Dec 2019 05:45), Max: 99.5 (10 Dec 2019 14:08)  HR: 66 (11 Dec 2019 05:45) (66 - 78)  BP: 117/63 (11 Dec 2019 05:45) (107/58 - 135/72)  BP(mean): --  RR: 18 (11 Dec 2019 05:45) (18 - 18)  SpO2: 95% (11 Dec 2019 05:45) (94% - 97%)  CT scan dye (Hives)  penicillin (Angioedema)      PHYSICAL EXAM:  GENERAL: NAD, well-groomed, well-developed, obese  HEAD:  Atraumatic, Normocephalic  EYES: EOMI  NECK: Supple  NERVOUS SYSTEM:  Alert & Oriented X3, Good concentration; Motor Strength 5/5 B/L upper., lower exam limited by pain.  CHEST/LUNG: Clear to percussion bilaterally; No rales, rhonchi, wheezing, or rubs appreciated  HEART: Regular rate and rhythm; No murmurs, rubs, or gallops  ABDOMEN: obese, soft, without tenderness, active bowel sounds. Some scars well healed from previous sx.  EXTREMITIES: Warm, well perfused. Lower extremity swelling bilaterally along with erythema and overlying likely chronic skin changes. IMproved from admission.  SKIN: chronic venous stasis/edematous skin changes in lower extremities.     Consultant(s) Notes Reviewed:  [x ] YES  [ ] NO  Care Discussed with Consultants/Other Providers [ x] YES  [ ] NO    LABS:                                              10.3   7.66  )-----------( 258      ( 10 Dec 2019 09:11 )             33.5      12-10    141  |  100  |  27<H>  ----------------------------<  99  3.4<L>   |  26  |  1.94<H>    Ca    9.1      10 Dec 2019 06:37  Phos  3.3     12-10  Mg     1.9     12-10            RADIOLOGY & ADDITIONAL TESTS:    Imaging Personally Reviewed:  [ ] YES  [ ] NO  acetaminophen   Tablet .. 650 milliGRAM(s) Oral every 6 hours PRN  apixaban 2.5 milliGRAM(s) Oral every 12 hours  atorvastatin 10 milliGRAM(s) Oral at bedtime  cholecalciferol 1000 Unit(s) Oral daily  cyanocobalamin 1000 MICROGram(s) Oral daily  dextrose 40% Gel 15 Gram(s) Oral once PRN  dextrose 5%. 1000 milliLiter(s) IV Continuous <Continuous>  dextrose 50% Injectable 12.5 Gram(s) IV Push once  dextrose 50% Injectable 25 Gram(s) IV Push once  dextrose 50% Injectable 25 Gram(s) IV Push once  furosemide   Injectable 40 milliGRAM(s) IV Push every 12 hours  gabapentin 300 milliGRAM(s) Oral daily  glucagon  Injectable 1 milliGRAM(s) IntraMuscular once PRN  hydrALAZINE 100 milliGRAM(s) Oral every 8 hours  insulin lispro (HumaLOG) corrective regimen sliding scale   SubCutaneous three times a day before meals  insulin lispro (HumaLOG) corrective regimen sliding scale   SubCutaneous at bedtime  isosorbide   mononitrate ER Tablet (IMDUR) 60 milliGRAM(s) Oral daily  metoprolol succinate ER 25 milliGRAM(s) Oral daily  oxyCODONE    IR 5 milliGRAM(s) Oral every 4 hours PRN  oxyCODONE  ER Tablet 10 milliGRAM(s) Oral every 8 hours  spironolactone 12.5 milliGRAM(s) Oral daily      HEALTH ISSUES - PROBLEM Dx:  Discharge planning issues: Discharge planning issues  Prophylactic measure: Prophylactic measure  DVT (deep venous thrombosis): DVT (deep venous thrombosis)  Lymphedema of arm: Lymphedema of arm  Type II diabetes mellitus: Type II diabetes mellitus  CKD (chronic kidney disease), stage III: CKD (chronic kidney disease), stage III  Hip pain, chronic, right: Hip pain, chronic, right  Hypertension: Hypertension  CAD (coronary artery disease): CAD (coronary artery disease)  Acute on chronic diastolic congestive heart failure: Acute on chronic diastolic congestive heart failure

## 2019-12-11 NOTE — DISCHARGE NOTE PROVIDER - NSDCMRMEDTOKEN_GEN_ALL_CORE_FT
atorvastatin 10 mg oral tablet: 1 tab(s) orally once a day  bumetanide 1 mg oral tablet: orally 2 times a day  docusate sodium 100 mg oral capsule: 1 cap(s) orally 3 times a day, As Needed  Eliquis 2.5 mg oral tablet: 1 tab(s) orally 2 times a day  gabapentin 300 mg oral tablet: orally once a day (at bedtime)  glimepiride 4 mg oral tablet: 1 tab(s) orally once a day  hydrALAZINE 100 mg oral tablet: 1 tab(s) orally 2 times a day  isosorbide mononitrate 60 mg oral tablet, extended release: 1 tab(s) orally once a day (in the morning)  Januvia 25 mg oral tablet: 1 tab(s) orally once a day  metoprolol succinate 25 mg oral tablet, extended release: 1 tab(s) orally once a day  oxyCODONE 10 mg oral tablet, extended release: 1 tab(s) orally every 8 hours  spironolactone: 12.5 milligram(s) orally once a day  Vitamin B-12: 1 tab(s) orally once a day  Vitamin D3 2000 intl units oral tablet: 1 tab(s) orally once a day atorvastatin 10 mg oral tablet: 1 tab(s) orally once a day  bumetanide 1 mg oral tablet: orally 2 times a day  docusate sodium 100 mg oral capsule: 1 cap(s) orally 3 times a day, As Needed  Eliquis 2.5 mg oral tablet: 1 tab(s) orally 2 times a day  gabapentin 300 mg oral tablet: orally once a day (at bedtime)  glimepiride 4 mg oral tablet: 1 tab(s) orally once a day  hydrALAZINE 100 mg oral tablet: 1 tab(s) orally 2 times a day  isosorbide mononitrate 60 mg oral tablet, extended release: 1 tab(s) orally once a day (in the morning)  Januvia 25 mg oral tablet: 1 tab(s) orally once a day  metoprolol succinate 25 mg oral tablet, extended release: 1 tab(s) orally once a day  oxyCODONE 10 mg oral tablet, extended release: 1 tab(s) orally every 8 hours  polyethylene glycol 3350 oral powder for reconstitution: 17 gram(s) orally once a day  simethicone 80 mg oral tablet, chewable: 1 tab(s) orally once a day, As needed, Upset Stomach  spironolactone: 12.5 milligram(s) orally once a day  Vitamin B-12: 1 tab(s) orally once a day  Vitamin D3 2000 intl units oral tablet: 1 tab(s) orally once a day atorvastatin 10 mg oral tablet: 1 tab(s) orally once a day  bisacodyl 5 mg oral delayed release tablet: 1 tab(s) orally once  bumetanide 1 mg oral tablet: orally 2 times a day  docusate sodium 100 mg oral capsule: 1 cap(s) orally 3 times a day, As Needed  Eliquis 2.5 mg oral tablet: 1 tab(s) orally 2 times a day  gabapentin 300 mg oral tablet: orally once a day (at bedtime)  glimepiride 4 mg oral tablet: 1 tab(s) orally once a day  hydrALAZINE 100 mg oral tablet: 1 tab(s) orally 2 times a day  isosorbide mononitrate 60 mg oral tablet, extended release: 1 tab(s) orally once a day (in the morning)  Januvia 25 mg oral tablet: 1 tab(s) orally once a day  metoprolol succinate 25 mg oral tablet, extended release: 1 tab(s) orally once a day  oxyCODONE 10 mg oral tablet, extended release: 1 tab(s) orally every 8 hours  polyethylene glycol 3350 oral powder for reconstitution: 17 gram(s) orally once a day  senna oral tablet: 1 tab(s) orally once  simethicone 80 mg oral tablet, chewable: 1 tab(s) orally once a day, As needed, Upset Stomach  spironolactone: 12.5 milligram(s) orally once a day  Vitamin B-12: 1 tab(s) orally once a day  Vitamin D3 2000 intl units oral tablet: 1 tab(s) orally once a day

## 2019-12-11 NOTE — PROGRESS NOTE ADULT - PROBLEM SELECTOR PLAN 8
RLE DVT in 02/2019, on renally dosed Eliquis  - c/w Eliquis 2.5mg BID  - will also check BLE [facility] duplex to assess for changes in known lower extremity thrombus RLE DVT in 02/2019, on renally dosed Eliquis  - c/w Eliquis 2.5mg BID  - will also check BLE VA duplex- No DVT B/L. Left leg has popliteal cyst.

## 2019-12-11 NOTE — PROGRESS NOTE ADULT - ASSESSMENT
78 y/o Serbian speaking F PMHx of HFpEF (EF 60-65%, stage II diastolic dysfunction), CAD (s/p NST showing areas of infarct, no prior caths), CKD (Stage III), breast cancer (s/p R mastectomy in 1989, c/b chronic RUE lymphedema, rectal cancer (s/p reversed ileostomy in 2015), RLE DVT (2/2019, on Eliquis), chronic R hip pain, T2DM (A1C 6.7 in 4/2019), HTN, and HLD, presented with bilateral LE swelling and redness, concern for CHF exacerbation.

## 2019-12-11 NOTE — PROGRESS NOTE ADULT - PROBLEM SELECTOR PLAN 1
Presenting with worsening b/l LE swelling and redness, concern for mild CHF exacerbation. Lower suspicion for b/l LE cellulitis, redness improves with leg raise. No pulmonary edema on CXR. Satting well on RA. NST in 05/2019 showed one fixed defect and one reversible defect consistent with areas of infarct, was not a candidate for LHC given renal dysfunction.  - TTE showing progression of to moderate aortic stenosis from mild AS from last ECHO (12/2018). Will consider structural heart consult, may benefit from TAVR if symptoms are recurrent. Discussed with Dr. Hagan office yesterday.  - diurese with Lasix 60mg IV BID, can consider oral diuresis. Patient still with pain due to swelling, not ambulating at baseline.  - daily weights, strict I/Os  - check trop, likely to be elevated in setting of renal dysfunction  - Lower extremity swelling likely secondary to CHF or progression of AS. Dopplers negative bilaterally.

## 2019-12-11 NOTE — DISCHARGE NOTE PROVIDER - CARE PROVIDER_API CALL
Ld Hagan)  Cardiovascular Disease; Nuclear Cardiology  [id number] [address] [place] [address], [address]  [place] [address]  Phone: [phone]  Fax: [phone]  Follow Up Time: 1 week

## 2019-12-11 NOTE — DISCHARGE NOTE PROVIDER - NSDCFUSCHEDAPPT_GEN_ALL_CORE_FT
NURIA GARCIA ; 12/30/2019 ; NP Med [address]Int [id number]UR[address] Coreas ; 12/30/2019 ; NP Cardio [id number]

## 2019-12-11 NOTE — DISCHARGE NOTE PROVIDER - NSDCCPCAREPLAN_GEN_ALL_CORE_FT
PRINCIPAL DISCHARGE DIAGNOSIS  Diagnosis: CHF exacerbation  Assessment and Plan of Treatment: You had a heart failure exacerbation. This was likely due to dietary indiscretion, inconsistent medication use or progression of aortic stenosis. Please continue to take your medications as prescribed, and to avoid excess salt or fluid intake to prevent reoccurence of symptoms.      SECONDARY DISCHARGE DIAGNOSES  Diagnosis: Aortic stenosis, moderate  Assessment and Plan of Treatment: You have aortic stenosis, which has progressed from mild to moderate severity over the last year. Please continue to take your medications as prescribed, and to follow up at Dr. Hagan' office. You should have frequent imaging of the heart to monitor for progression of aortic stenosis.

## 2019-12-11 NOTE — DISCHARGE NOTE PROVIDER - HOSPITAL COURSE
HPI    78 y/o Maltese speaking F PMHx of HFpEF (EF 60-65%, stage II diastolic dysfunction), CAD (s/p NST showing areas of infarct, no prior caths), CKD (Stage III), Breast cancer (s/p R mastectomy in 1989, c/b chronic RUE lymphedema, rectal cancer (s/p reversed ileostomy in 2015), RLE DVT (2/2019, on Eliquis), chronic R hip pain, T2DM (A1C 6.7 in 4/2019), HTN, and HLD, presented with bilateral LE swelling and redness. Daughter Shauna at bedside provided translation. Reports she has been having worsening LE edema and redness over the last several days. Denies any trauma to the LEs. No change in diet or fluid intake. Adhering to Bumex 1mg BID, no missed doses. Usually does not lie flat due to discomfort. Denies BHAT/SOB, CP, cough, fever/chills, N/V, or diarrhea. At baseline, ambulates with a walker. Lives alone, but her children usually visit and help with ADLs. Has chronic R hip pain 2/2 OA. Was planned to undergo R hip replacement, but pre-Op NST showed multiple defects concerning for areas of infarct, was unable to undergo LHC due to CKD. Outpatient renal US showed severe R hydroureter w/ atrophied R kidney.         In the ED:    Vitals: afebrile, HR 75, /79, spO2 97% on RA    Labs: WBC 8.5, Cr 1.83 (baseline), procal 0.12    Imaging: CXR no infiltrates    Given vancomycin 1g in the ED        Hospital Course    Patient was admitted for treatment of CHF exacerbation. She was started on IV diuretics (Lasix 60mg BID) and continued to diurese well over the her admission. A transthoracic ultrasound was done to reassess the heart. While EF was relatively unchanged from previous ECHO last december 2018, there was interval worsening of mild aortic stenosis to moderate aortic stenosis. These findings were reiterated to the patient's cardiologist Dr. Hagan.  Follow up appointment was made at Dr. Hagan office. HPI    78 y/o Thai speaking F PMHx of HFpEF (EF 60-65%, stage II diastolic dysfunction), CAD (s/p NST showing areas of infarct, no prior caths), CKD (Stage III), Breast cancer (s/p R mastectomy in 1989, c/b chronic RUE lymphedema, rectal cancer (s/p reversed ileostomy in 2015), RLE DVT (2/2019, on Eliquis), chronic R hip pain, T2DM (A1C 6.7 in 4/2019), HTN, and HLD, presented with bilateral LE swelling and redness. Daughter Shauna at bedside provided translation. Reports she has been having worsening LE edema and redness over the last several days. Denies any trauma to the LEs. No change in diet or fluid intake. Adhering to Bumex 1mg BID, no missed doses. Usually does not lie flat due to discomfort. Denies BHAT/SOB, CP, cough, fever/chills, N/V, or diarrhea. At baseline, ambulates with a walker. Lives alone, but her children usually visit and help with ADLs. Has chronic R hip pain 2/2 OA. Was planned to undergo R hip replacement, but pre-Op NST showed multiple defects concerning for areas of infarct, was unable to undergo LHC due to CKD. Outpatient renal US showed severe R hydroureter w/ atrophied R kidney.         In the ED:    Vitals: afebrile, HR 75, /79, spO2 97% on RA    Labs: WBC 8.5, Cr 1.83 (baseline), procal 0.12    Imaging: CXR no infiltrates    Given vancomycin 1g in the ED        Hospital Course    Patient was admitted for treatment of CHF exacerbation. She was started on IV diuretics (Lasix 60mg BID) and continued to diurese well over the her admission. A transthoracic ultrasound was done to reassess the heart. While EF was relatively unchanged from previous ECHO last december 2018, there was interval worsening of mild aortic stenosis to moderate aortic stenosis. These findings were reiterated to the patient's cardiologist Dr. Hagan.  Follow up appointment was made at Dr. Hagan office. Patient was seen by physical therapy who recommended subacute rehab for strengthening. At time of discharge patient's lower extremity swelling and pain had been reduced close to baseline. Patient was restarted on home dosed oral diuretics. HPI    80 y/o Belarusian speaking F PMHx of HFpEF (EF 60-65%, stage II diastolic dysfunction), CAD (s/p NST showing areas of infarct, no prior caths), CKD (Stage III), Breast cancer (s/p R mastectomy in 1989, c/b chronic RUE lymphedema, rectal cancer (s/p reversed ileostomy in 2015), RLE DVT (2/2019, on Eliquis), chronic R hip pain, T2DM (A1C 6.7 in 4/2019), HTN, and HLD, presented with bilateral LE swelling and redness. Daughter Shauna at bedside provided translation. Reports she has been having worsening LE edema and redness over the last several days. Denies any trauma to the LEs. No change in diet or fluid intake. Adhering to Bumex 1mg BID, no missed doses. Usually does not lie flat due to discomfort. Denies BHAT/SOB, CP, cough, fever/chills, N/V, or diarrhea. At baseline, ambulates with a walker. Lives alone, but her children usually visit and help with ADLs. Has chronic R hip pain 2/2 OA. Was planned to undergo R hip replacement, but pre-Op NST showed multiple defects concerning for areas of infarct, was unable to undergo LHC due to CKD. Outpatient renal US showed severe R hydroureter w/ atrophied R kidney.         In the ED:    Vitals: afebrile, HR 75, /79, spO2 97% on RA    Labs: WBC 8.5, Cr 1.83 (baseline), procal 0.12    Imaging: CXR no infiltrates    Given vancomycin 1g in the ED        Hospital Course    Patient was admitted for treatment of CHF exacerbation. She was started on IV diuretics (Lasix 60mg BID) and continued to diurese well over the her admission. A transthoracic ultrasound was done to reassess the heart. While EF was relatively unchanged from previous ECHO last december 2018, there was interval worsening of mild aortic stenosis to moderate aortic stenosis. These findings were reiterated to the patient's cardiologist Dr. Hagan.  Follow up appointment was made at Dr. Hagan office. Patient was seen by physical therapy who recommended subacute rehab for strengthening. At time of discharge patient's lower extremity swelling and pain had been reduced close to baseline. Patient was restarted on home dosed oral diuretics and sent to subacute rehab.

## 2019-12-11 NOTE — PROGRESS NOTE ADULT - ATTENDING COMMENTS
Patient seen and examined.  Diuresing well, can switch to home po diuretic regiment.  Echo with progression of AS.  Out patient follow-up.  PT eval, d/c planning, total d/c time 40 minutes.

## 2019-12-11 NOTE — DISCHARGE NOTE PROVIDER - INSTRUCTIONS
Please try to limit salt and excess water intake. Please avoid excess carbohydrates and fat in the diet.

## 2019-12-11 NOTE — PROGRESS NOTE ADULT - PROBLEM SELECTOR PLAN 10
Transitions of Care Status:  1.  Name of PCP: Bentley Aguirre  2.  PCP Contacted on Admission: [x ] Y    [ ] N    3.  PCP contacted at Discharge: [ ] Y    [ ] N    [ ] N/A  4.  Post-Discharge Appointment Date and Location:  5.  Summary of Handoff given to PCP:

## 2019-12-12 ENCOUNTER — RX RENEWAL (OUTPATIENT)
Age: 79
End: 2019-12-12

## 2019-12-12 DIAGNOSIS — R10.9 UNSPECIFIED ABDOMINAL PAIN: ICD-10-CM

## 2019-12-12 LAB
ANION GAP SERPL CALC-SCNC: 19 MMOL/L — HIGH (ref 5–17)
ANION GAP SERPL CALC-SCNC: 20 MMOL/L — HIGH (ref 5–17)
BUN SERPL-MCNC: 35 MG/DL — HIGH (ref 7–23)
BUN SERPL-MCNC: 36 MG/DL — HIGH (ref 7–23)
CALCIUM SERPL-MCNC: 9.3 MG/DL — SIGNIFICANT CHANGE UP (ref 8.4–10.5)
CALCIUM SERPL-MCNC: 9.5 MG/DL — SIGNIFICANT CHANGE UP (ref 8.4–10.5)
CHLORIDE SERPL-SCNC: 89 MMOL/L — LOW (ref 96–108)
CHLORIDE SERPL-SCNC: 91 MMOL/L — LOW (ref 96–108)
CO2 SERPL-SCNC: 21 MMOL/L — LOW (ref 22–31)
CO2 SERPL-SCNC: 25 MMOL/L — SIGNIFICANT CHANGE UP (ref 22–31)
CREAT SERPL-MCNC: 1.93 MG/DL — HIGH (ref 0.5–1.3)
CREAT SERPL-MCNC: 2 MG/DL — HIGH (ref 0.5–1.3)
GLUCOSE BLDC GLUCOMTR-MCNC: 156 MG/DL — HIGH (ref 70–99)
GLUCOSE BLDC GLUCOMTR-MCNC: 156 MG/DL — HIGH (ref 70–99)
GLUCOSE BLDC GLUCOMTR-MCNC: 178 MG/DL — HIGH (ref 70–99)
GLUCOSE BLDC GLUCOMTR-MCNC: 184 MG/DL — HIGH (ref 70–99)
GLUCOSE BLDC GLUCOMTR-MCNC: 198 MG/DL — HIGH (ref 70–99)
GLUCOSE SERPL-MCNC: 232 MG/DL — HIGH (ref 70–99)
GLUCOSE SERPL-MCNC: 246 MG/DL — HIGH (ref 70–99)
LACTATE SERPL-SCNC: 1.1 MMOL/L — SIGNIFICANT CHANGE UP (ref 0.7–2)
MAGNESIUM SERPL-MCNC: 1.9 MG/DL — SIGNIFICANT CHANGE UP (ref 1.6–2.6)
PHOSPHATE SERPL-MCNC: 3.2 MG/DL — SIGNIFICANT CHANGE UP (ref 2.5–4.5)
POTASSIUM SERPL-MCNC: 4.2 MMOL/L — SIGNIFICANT CHANGE UP (ref 3.5–5.3)
POTASSIUM SERPL-MCNC: 5.9 MMOL/L — HIGH (ref 3.5–5.3)
POTASSIUM SERPL-SCNC: 4.2 MMOL/L — SIGNIFICANT CHANGE UP (ref 3.5–5.3)
POTASSIUM SERPL-SCNC: 5.9 MMOL/L — HIGH (ref 3.5–5.3)
PROCALCITONIN SERPL-MCNC: 0.2 NG/ML — HIGH (ref 0.02–0.1)
SODIUM SERPL-SCNC: 132 MMOL/L — LOW (ref 135–145)
SODIUM SERPL-SCNC: 133 MMOL/L — LOW (ref 135–145)
TROPONIN T, HIGH SENSITIVITY RESULT: 24 NG/L — SIGNIFICANT CHANGE UP (ref 0–51)

## 2019-12-12 PROCEDURE — 74018 RADEX ABDOMEN 1 VIEW: CPT | Mod: 26

## 2019-12-12 PROCEDURE — 93010 ELECTROCARDIOGRAM REPORT: CPT

## 2019-12-12 PROCEDURE — 99232 SBSQ HOSP IP/OBS MODERATE 35: CPT | Mod: GC

## 2019-12-12 RX ORDER — POLYETHYLENE GLYCOL 3350 17 G/17G
17 POWDER, FOR SOLUTION ORAL
Qty: 0 | Refills: 0 | DISCHARGE
Start: 2019-12-12

## 2019-12-12 RX ORDER — OXYCODONE HYDROCHLORIDE 5 MG/1
10 TABLET ORAL EVERY 8 HOURS
Refills: 0 | Status: DISCONTINUED | OUTPATIENT
Start: 2019-12-12 | End: 2019-12-13

## 2019-12-12 RX ORDER — SIMETHICONE 80 MG/1
80 TABLET, CHEWABLE ORAL DAILY
Refills: 0 | Status: DISCONTINUED | OUTPATIENT
Start: 2019-12-12 | End: 2019-12-13

## 2019-12-12 RX ORDER — SIMETHICONE 80 MG/1
1 TABLET, CHEWABLE ORAL
Qty: 0 | Refills: 0 | DISCHARGE
Start: 2019-12-12

## 2019-12-12 RX ORDER — ONDANSETRON 8 MG/1
4 TABLET, FILM COATED ORAL ONCE
Refills: 0 | Status: COMPLETED | OUTPATIENT
Start: 2019-12-12 | End: 2019-12-12

## 2019-12-12 RX ORDER — POLYETHYLENE GLYCOL 3350 17 G/17G
17 POWDER, FOR SOLUTION ORAL DAILY
Refills: 0 | Status: DISCONTINUED | OUTPATIENT
Start: 2019-12-12 | End: 2019-12-13

## 2019-12-12 RX ADMIN — Medication 100 MILLIGRAM(S): at 13:42

## 2019-12-12 RX ADMIN — OXYCODONE HYDROCHLORIDE 5 MILLIGRAM(S): 5 TABLET ORAL at 13:56

## 2019-12-12 RX ADMIN — BUMETANIDE 1 MILLIGRAM(S): 0.25 INJECTION INTRAMUSCULAR; INTRAVENOUS at 06:26

## 2019-12-12 RX ADMIN — BUMETANIDE 1 MILLIGRAM(S): 0.25 INJECTION INTRAMUSCULAR; INTRAVENOUS at 17:38

## 2019-12-12 RX ADMIN — GABAPENTIN 300 MILLIGRAM(S): 400 CAPSULE ORAL at 13:42

## 2019-12-12 RX ADMIN — ISOSORBIDE MONONITRATE 60 MILLIGRAM(S): 60 TABLET, EXTENDED RELEASE ORAL at 13:41

## 2019-12-12 RX ADMIN — SPIRONOLACTONE 12.5 MILLIGRAM(S): 25 TABLET, FILM COATED ORAL at 06:49

## 2019-12-12 RX ADMIN — PREGABALIN 1000 MICROGRAM(S): 225 CAPSULE ORAL at 13:42

## 2019-12-12 RX ADMIN — OXYCODONE HYDROCHLORIDE 5 MILLIGRAM(S): 5 TABLET ORAL at 04:30

## 2019-12-12 RX ADMIN — APIXABAN 2.5 MILLIGRAM(S): 2.5 TABLET, FILM COATED ORAL at 06:26

## 2019-12-12 RX ADMIN — Medication 1: at 09:31

## 2019-12-12 RX ADMIN — POLYETHYLENE GLYCOL 3350 17 GRAM(S): 17 POWDER, FOR SOLUTION ORAL at 13:48

## 2019-12-12 RX ADMIN — Medication 1: at 13:43

## 2019-12-12 RX ADMIN — ONDANSETRON 4 MILLIGRAM(S): 8 TABLET, FILM COATED ORAL at 01:58

## 2019-12-12 RX ADMIN — Medication 1: at 17:40

## 2019-12-12 RX ADMIN — Medication 25 MILLIGRAM(S): at 06:26

## 2019-12-12 RX ADMIN — APIXABAN 2.5 MILLIGRAM(S): 2.5 TABLET, FILM COATED ORAL at 17:38

## 2019-12-12 RX ADMIN — ATORVASTATIN CALCIUM 10 MILLIGRAM(S): 80 TABLET, FILM COATED ORAL at 22:18

## 2019-12-12 RX ADMIN — Medication 100 MILLIGRAM(S): at 06:26

## 2019-12-12 RX ADMIN — OXYCODONE HYDROCHLORIDE 5 MILLIGRAM(S): 5 TABLET ORAL at 14:30

## 2019-12-12 RX ADMIN — OXYCODONE HYDROCHLORIDE 5 MILLIGRAM(S): 5 TABLET ORAL at 04:00

## 2019-12-12 RX ADMIN — Medication 100 MILLIGRAM(S): at 22:18

## 2019-12-12 NOTE — PROGRESS NOTE ADULT - PROBLEM SELECTOR PLAN 3
- Had an episode of epigastric non radiating sharp abdominal pain that resolved with rest. EKG and trop negative. No stool in last 2 days.   - home pain regimen oxycodone ER 10mg q8h changed to PRN to aid in stooling. Added bowel regimen- miralax. Simethicone PRN.

## 2019-12-12 NOTE — PROGRESS NOTE ADULT - PROBLEM SELECTOR PLAN 8
RLE DVT in 02/2019, on renally dosed Eliquis  - c/w Eliquis 2.5mg BID  - will also check BLE [facility] duplex- No DVT B/L. Left leg has popliteal cyst.

## 2019-12-12 NOTE — CHART NOTE - NSCHARTNOTEFT_GEN_A_CORE
patient complained of acute abdominal pain w/ N/V.  EKG showed no acute changes.  troponin and abdominal xray ordered.  PRN dose of oxy given with mild relief of symptoms.  will continue to monitor

## 2019-12-12 NOTE — PROGRESS NOTE ADULT - SUBJECTIVE AND OBJECTIVE BOX
[facility]  Internal Medicine PGY-1   [facility] Pager [phone]/ [phone]    NURIA GARCIA  79y  Female      Patient is a 79y old  Female who presents with a chief complaint of bilateral leg swelling, redness (08 Dec 2019 19:32)      INTERVAL HPI/OVERNIGHT EVENTS: Patient notes improvement in swelling and redness. Notes improvement in lower extremity pain. Will try to move around today more to avoid rehab. Patient notes had an episode of abdominal pain last night. Notes it was epigastric and sharp, but resided with rest. Patient notes has not had a stool in 2 days. Denies fevers or chills. Denies chest pain or diarrhea/constipation. Was hemodynamically stable. EKG, trop unconcerning.       REVIEW OF SYSTEMS:  CONSTITUTIONAL: No fever or chills.  RESPIRATORY: No cough, chills  No shortness of breath  CARDIOVASCULAR: No chest pain, palpitations, dizziness.  GASTROINTESTINAL: No abdominal pain or diarrhea.  GENITOURINARY: No dysuria  SKIN: Redness of bilateral shins along with swelling and pain  MUSCULOSKELETAL: Chronic pain in r hip. Lower extremity redness and swelling bilaterally    FAMILY HISTORY:  Family history of heart attack (Child)  Family history of diabetes mellitus in son  Family history of diabetes mellitus in mother    Vital Signs Last 24 Hrs  T(C): 36.4 (12 Dec 2019 03:50), Max: 37.2 (11 Dec 2019 13:58)  T(F): 97.5 (12 Dec 2019 03:50), Max: 99 (11 Dec 2019 13:58)  HR: 89 (12 Dec 2019 03:50) (68 - 89)  BP: 130/60 (12 Dec 2019 06:22) (109/66 - 145/76)  BP(mean): --  RR: 22 (12 Dec 2019 03:50) (17 - 22)  SpO2: 98% (12 Dec 2019 03:50) (95% - 98%)    CT scan dye (Hives)  penicillin (Angioedema)      PHYSICAL EXAM:  GENERAL: NAD, well-groomed, well-developed, obese  HEAD:  Atraumatic, Normocephalic  EYES: EOMI  NECK: Supple  NERVOUS SYSTEM:  Alert & Oriented X3, Good concentration; Motor Strength 5/5 B/L upper., lower exam limited by pain.  CHEST/LUNG: Clear to percussion bilaterally; No rales, rhonchi, wheezing, or rubs appreciated  HEART: Regular rate and rhythm; No murmurs, rubs, or gallops  ABDOMEN: obese, soft, without tenderness, active bowel sounds. Non tender in epigastric area or four quadrants. Some scars well healed from previous sx.  EXTREMITIES: Warm, well perfused. Lower extremity swelling bilaterally along with erythema and overlying likely chronic skin changes. IMproved from admission.  SKIN: chronic venous stasis/edematous skin changes in lower extremities.     Consultant(s) Notes Reviewed:  [x ] YES  [ ] NO  Care Discussed with Consultants/Other Providers [ x] YES  [ ] NO    LABS:                                  12-12    133<L>  |  89<L>  |  35<H>  ----------------------------<  246<H>  4.2   |  25  |  2.00<H>    Ca    9.3      12 Dec 2019 05:10  Phos  3.2     12-12  Mg     1.9     12-12              RADIOLOGY & ADDITIONAL TESTS:    Imaging Personally Reviewed:  [ ] YES  [ ] NO  acetaminophen   Tablet .. 650 milliGRAM(s) Oral every 6 hours PRN  apixaban 2.5 milliGRAM(s) Oral every 12 hours  atorvastatin 10 milliGRAM(s) Oral at bedtime  cholecalciferol 1000 Unit(s) Oral daily  cyanocobalamin 1000 MICROGram(s) Oral daily  dextrose 40% Gel 15 Gram(s) Oral once PRN  dextrose 5%. 1000 milliLiter(s) IV Continuous <Continuous>  dextrose 50% Injectable 12.5 Gram(s) IV Push once  dextrose 50% Injectable 25 Gram(s) IV Push once  dextrose 50% Injectable 25 Gram(s) IV Push once  furosemide   Injectable 40 milliGRAM(s) IV Push every 12 hours  gabapentin 300 milliGRAM(s) Oral daily  glucagon  Injectable 1 milliGRAM(s) IntraMuscular once PRN  hydrALAZINE 100 milliGRAM(s) Oral every 8 hours  insulin lispro (HumaLOG) corrective regimen sliding scale   SubCutaneous three times a day before meals  insulin lispro (HumaLOG) corrective regimen sliding scale   SubCutaneous at bedtime  isosorbide   mononitrate ER Tablet (IMDUR) 60 milliGRAM(s) Oral daily  metoprolol succinate ER 25 milliGRAM(s) Oral daily  oxyCODONE    IR 5 milliGRAM(s) Oral every 4 hours PRN  oxyCODONE  ER Tablet 10 milliGRAM(s) Oral every 8 hours  spironolactone 12.5 milliGRAM(s) Oral daily      HEALTH ISSUES - PROBLEM Dx:  Discharge planning issues: Discharge planning issues  Prophylactic measure: Prophylactic measure  DVT (deep venous thrombosis): DVT (deep venous thrombosis)  Lymphedema of arm: Lymphedema of arm  Type II diabetes mellitus: Type II diabetes mellitus  CKD (chronic kidney disease), stage III: CKD (chronic kidney disease), stage III  Hip pain, chronic, right: Hip pain, chronic, right  Hypertension: Hypertension  CAD (coronary artery disease): CAD (coronary artery disease)  Acute on chronic diastolic congestive heart failure: Acute on chronic diastolic congestive heart failure

## 2019-12-12 NOTE — PROGRESS NOTE ADULT - PROBLEM SELECTOR PLAN 4
Chronic R hip pain due to OA  - home pain regimen oxycodone ER 10mg q8h changed to PRN for abdominal pain. Added bowel regimen.  - oxycodone 5mg q4h PRN for severe pain. see ISTOP

## 2019-12-12 NOTE — PROGRESS NOTE ADULT - ASSESSMENT
80 y/o Yi speaking F PMHx of HFpEF (EF 60-65%, stage II diastolic dysfunction), CAD (s/p NST showing areas of infarct, no prior caths), CKD (Stage III), breast cancer (s/p R mastectomy in 1989, c/b chronic RUE lymphedema, rectal cancer (s/p reversed ileostomy in 2015), RLE DVT (2/2019, on Eliquis), chronic R hip pain, T2DM (A1C 6.7 in 4/2019), HTN, and HLD, presented with bilateral LE swelling and redness, concern for CHF exacerbation.

## 2019-12-12 NOTE — PROGRESS NOTE ADULT - PROBLEM SELECTOR PLAN 1
Presenting with worsening b/l LE swelling and redness, concern for mild CHF exacerbation. Lower suspicion for b/l LE cellulitis, redness improves with leg raise. No pulmonary edema on CXR. Satting well on RA. NST in 05/2019 showed one fixed defect and one reversible defect consistent with areas of infarct, was not a candidate for LHC given renal dysfunction.  - TTE showing progression of to moderate aortic stenosis from mild AS from last ECHO (12/2018). Will consider structural heart consult, may benefit from TAVR if symptoms are recurrent. Discussed with Dr. Hagan office yesterday.  - bumex 1mg oral BID. Patient still with pain due to swelling, not ambulating at baseline.  - daily weights, strict I/Os  - check trop, likely to be elevated in setting of renal dysfunction  - Lower extremity swelling likely secondary to CHF or progression of AS. Dopplers negative bilaterally.

## 2019-12-12 NOTE — PROGRESS NOTE ADULT - PROBLEM SELECTOR PLAN 9
DVT PROPH: Eliquis  Diet: CC  Dispo: pending improvement of BLE edema and erythema likely to subacute rehab. Will depend on wishes of patient and family.

## 2019-12-13 ENCOUNTER — TRANSCRIPTION ENCOUNTER (OUTPATIENT)
Age: 79
End: 2019-12-13

## 2019-12-13 VITALS
DIASTOLIC BLOOD PRESSURE: 57 MMHG | RESPIRATION RATE: 17 BRPM | TEMPERATURE: 98 F | SYSTOLIC BLOOD PRESSURE: 157 MMHG | HEART RATE: 70 BPM

## 2019-12-13 LAB
GLUCOSE BLDC GLUCOMTR-MCNC: 224 MG/DL — HIGH (ref 70–99)
GLUCOSE BLDC GLUCOMTR-MCNC: 260 MG/DL — HIGH (ref 70–99)

## 2019-12-13 PROCEDURE — 84295 ASSAY OF SERUM SODIUM: CPT

## 2019-12-13 PROCEDURE — 85610 PROTHROMBIN TIME: CPT

## 2019-12-13 PROCEDURE — 71045 X-RAY EXAM CHEST 1 VIEW: CPT

## 2019-12-13 PROCEDURE — 84100 ASSAY OF PHOSPHORUS: CPT

## 2019-12-13 PROCEDURE — 83036 HEMOGLOBIN GLYCOSYLATED A1C: CPT

## 2019-12-13 PROCEDURE — 82947 ASSAY GLUCOSE BLOOD QUANT: CPT

## 2019-12-13 PROCEDURE — 84484 ASSAY OF TROPONIN QUANT: CPT

## 2019-12-13 PROCEDURE — 93306 TTE W/DOPPLER COMPLETE: CPT

## 2019-12-13 PROCEDURE — 85027 COMPLETE CBC AUTOMATED: CPT

## 2019-12-13 PROCEDURE — 85014 HEMATOCRIT: CPT

## 2019-12-13 PROCEDURE — 99239 HOSP IP/OBS DSCHRG MGMT >30: CPT

## 2019-12-13 PROCEDURE — 85652 RBC SED RATE AUTOMATED: CPT

## 2019-12-13 PROCEDURE — 93005 ELECTROCARDIOGRAM TRACING: CPT

## 2019-12-13 PROCEDURE — 93308 TTE F-UP OR LMTD: CPT

## 2019-12-13 PROCEDURE — 87040 BLOOD CULTURE FOR BACTERIA: CPT

## 2019-12-13 PROCEDURE — 83880 ASSAY OF NATRIURETIC PEPTIDE: CPT

## 2019-12-13 PROCEDURE — 93970 EXTREMITY STUDY: CPT

## 2019-12-13 PROCEDURE — 99285 EMERGENCY DEPT VISIT HI MDM: CPT | Mod: 25

## 2019-12-13 PROCEDURE — 74018 RADEX ABDOMEN 1 VIEW: CPT

## 2019-12-13 PROCEDURE — 82962 GLUCOSE BLOOD TEST: CPT

## 2019-12-13 PROCEDURE — 82435 ASSAY OF BLOOD CHLORIDE: CPT

## 2019-12-13 PROCEDURE — 80053 COMPREHEN METABOLIC PANEL: CPT

## 2019-12-13 PROCEDURE — 81001 URINALYSIS AUTO W/SCOPE: CPT

## 2019-12-13 PROCEDURE — 80048 BASIC METABOLIC PNL TOTAL CA: CPT

## 2019-12-13 PROCEDURE — 83605 ASSAY OF LACTIC ACID: CPT

## 2019-12-13 PROCEDURE — 82803 BLOOD GASES ANY COMBINATION: CPT

## 2019-12-13 PROCEDURE — 84132 ASSAY OF SERUM POTASSIUM: CPT

## 2019-12-13 PROCEDURE — 82330 ASSAY OF CALCIUM: CPT

## 2019-12-13 PROCEDURE — 84145 PROCALCITONIN (PCT): CPT

## 2019-12-13 PROCEDURE — 87086 URINE CULTURE/COLONY COUNT: CPT

## 2019-12-13 PROCEDURE — 97161 PT EVAL LOW COMPLEX 20 MIN: CPT

## 2019-12-13 PROCEDURE — 85730 THROMBOPLASTIN TIME PARTIAL: CPT

## 2019-12-13 PROCEDURE — 83735 ASSAY OF MAGNESIUM: CPT

## 2019-12-13 RX ORDER — SENNA PLUS 8.6 MG/1
1 TABLET ORAL ONCE
Refills: 0 | Status: COMPLETED | OUTPATIENT
Start: 2019-12-13 | End: 2019-12-13

## 2019-12-13 RX ORDER — SENNA PLUS 8.6 MG/1
1 TABLET ORAL
Qty: 0 | Refills: 0 | DISCHARGE
Start: 2019-12-13

## 2019-12-13 RX ADMIN — APIXABAN 2.5 MILLIGRAM(S): 2.5 TABLET, FILM COATED ORAL at 06:26

## 2019-12-13 RX ADMIN — Medication 5 MILLIGRAM(S): at 13:56

## 2019-12-13 RX ADMIN — GABAPENTIN 300 MILLIGRAM(S): 400 CAPSULE ORAL at 13:47

## 2019-12-13 RX ADMIN — SPIRONOLACTONE 12.5 MILLIGRAM(S): 25 TABLET, FILM COATED ORAL at 06:26

## 2019-12-13 RX ADMIN — ISOSORBIDE MONONITRATE 60 MILLIGRAM(S): 60 TABLET, EXTENDED RELEASE ORAL at 13:48

## 2019-12-13 RX ADMIN — BUMETANIDE 1 MILLIGRAM(S): 0.25 INJECTION INTRAMUSCULAR; INTRAVENOUS at 06:26

## 2019-12-13 RX ADMIN — POLYETHYLENE GLYCOL 3350 17 GRAM(S): 17 POWDER, FOR SOLUTION ORAL at 15:45

## 2019-12-13 RX ADMIN — Medication 100 MILLIGRAM(S): at 06:26

## 2019-12-13 RX ADMIN — Medication 100 MILLIGRAM(S): at 13:48

## 2019-12-13 RX ADMIN — Medication 2: at 08:57

## 2019-12-13 RX ADMIN — PREGABALIN 1000 MICROGRAM(S): 225 CAPSULE ORAL at 13:46

## 2019-12-13 RX ADMIN — Medication 25 MILLIGRAM(S): at 06:26

## 2019-12-13 RX ADMIN — Medication 3: at 13:27

## 2019-12-13 RX ADMIN — OXYCODONE HYDROCHLORIDE 5 MILLIGRAM(S): 5 TABLET ORAL at 11:00

## 2019-12-13 RX ADMIN — OXYCODONE HYDROCHLORIDE 5 MILLIGRAM(S): 5 TABLET ORAL at 10:04

## 2019-12-13 NOTE — DISCHARGE NOTE NURSING/CASE MANAGEMENT/SOCIAL WORK - PATIENT PORTAL LINK FT
You can access the FollowMyHealth Patient Portal offered by [facility] by registering at the following website: [email or web address]. By joining [employer]’s FollowMyHealth portal, you will also be able to view your health information using other applications (apps) compatible with our system.

## 2019-12-13 NOTE — PROGRESS NOTE ADULT - PROBLEM SELECTOR PLAN 1
Presenting with worsening b/l LE swelling and redness, concern for mild CHF exacerbation. Lower suspicion for b/l LE cellulitis, redness improves with leg raise. No pulmonary edema on CXR. Satting well on RA. NST in 05/2019 showed one fixed defect and one reversible defect consistent with areas of infarct, was not a candidate for LHC given renal dysfunction.  - TTE showing progression of to moderate aortic stenosis from mild AS from last ECHO (12/2018). Will consider structural heart consult, may benefit from TAVR if symptoms are recurrent. Discussed with Dr. Hagan office yesterday.  - bumex 1mg oral BID. Patient still with pain due to swelling, not ambulating at baseline.  - daily weights, strict I/Os  - Lower extremity swelling likely secondary to CHF or progression of AS. Dopplers negative bilaterally.

## 2019-12-13 NOTE — PROGRESS NOTE ADULT - PROBLEM SELECTOR PROBLEM 1
Acute on chronic diastolic congestive heart failure
124

## 2019-12-13 NOTE — PROGRESS NOTE ADULT - PROBLEM SELECTOR PLAN 3
- Had an episode of epigastric non radiating sharp abdominal pain, none overnight. EKG and trop negative. No stool in last 2 days.   - home pain regimen oxycodone ER 10mg q8h changed to PRN to aid in stooling. Added bowel regimen- miralax. Simethicone PRN.

## 2019-12-13 NOTE — PROGRESS NOTE ADULT - SUBJECTIVE AND OBJECTIVE BOX
[facility]  Internal Medicine PGY-1   [facility] Pager [phone]/ [phone]    NURIA GARCIA  79y  Female      Patient is a 79y old  Female who presents with a chief complaint of bilateral leg swelling, redness (08 Dec 2019 19:32)      INTERVAL HPI/OVERNIGHT EVENTS: Patient notes improvement in swelling and redness. Notes improvement in lower extremity pain. . Denies fevers or chills. Denies chest pain or diarrhea/constipation.       REVIEW OF SYSTEMS:  CONSTITUTIONAL: No fever or chills.  RESPIRATORY: No cough, chills  No shortness of breath  CARDIOVASCULAR: No chest pain, palpitations, dizziness.  GASTROINTESTINAL: No abdominal pain or diarrhea.  GENITOURINARY: No dysuria  SKIN: Redness of bilateral shins along with swelling and pain  MUSCULOSKELETAL: Chronic pain in r hip. Lower extremity redness and swelling bilaterally    FAMILY HISTORY:  Family history of heart attack (Child)  Family history of diabetes mellitus in son  Family history of diabetes mellitus in mother    Vital Signs Last 24 Hrs  T(C): 36.8 (13 Dec 2019 06:24), Max: 37.4 (12 Dec 2019 11:53)  T(F): 98.2 (13 Dec 2019 06:24), Max: 99.3 (12 Dec 2019 11:53)  HR: 74 (13 Dec 2019 06:24) (72 - 80)  BP: 125/70 (13 Dec 2019 06:24) (109/65 - 137/68)  BP(mean): --  RR: 16 (13 Dec 2019 06:24) (16 - 18)  SpO2: 95% (13 Dec 2019 06:24) (95% - 96%)    CT scan dye (Hives)  penicillin (Angioedema)      PHYSICAL EXAM:  GENERAL: NAD, well-groomed, well-developed, obese  HEAD:  Atraumatic, Normocephalic  EYES: EOMI  NECK: Supple  NERVOUS SYSTEM:  Alert & Oriented X3, Good concentration; Motor Strength 5/5 B/L upper., lower exam limited by pain.  CHEST/LUNG: Clear to percussion bilaterally; No rales, rhonchi, wheezing, or rubs appreciated  HEART: Regular rate and rhythm; No murmurs, rubs, or gallops  ABDOMEN: obese, soft, without tenderness, active bowel sounds. Non tender in epigastric area or four quadrants. Some scars well healed from previous sx.  EXTREMITIES: Warm, well perfused. Lower extremity swelling bilaterally along with erythema and overlying likely chronic skin changes. IMproved from admission.  SKIN: chronic venous stasis/edematous skin changes in lower extremities.     Consultant(s) Notes Reviewed:  [x ] YES  [ ] NO  Care Discussed with Consultants/Other Providers [ x] YES  [ ] NO    LABS:             12-12    133<L>  |  89<L>  |  35<H>  ----------------------------<  246<H>  4.2   |  25  |  2.00<H>    Ca    9.3      12 Dec 2019 05:10  Phos  3.2     12-12  Mg     1.9     12-12      RADIOLOGY & ADDITIONAL TESTS:    Imaging Personally Reviewed:  [ ] YES  [ ] NO  acetaminophen   Tablet .. 650 milliGRAM(s) Oral every 6 hours PRN  apixaban 2.5 milliGRAM(s) Oral every 12 hours  atorvastatin 10 milliGRAM(s) Oral at bedtime  cholecalciferol 1000 Unit(s) Oral daily  cyanocobalamin 1000 MICROGram(s) Oral daily  dextrose 40% Gel 15 Gram(s) Oral once PRN  dextrose 5%. 1000 milliLiter(s) IV Continuous <Continuous>  dextrose 50% Injectable 12.5 Gram(s) IV Push once  dextrose 50% Injectable 25 Gram(s) IV Push once  dextrose 50% Injectable 25 Gram(s) IV Push once  furosemide   Injectable 40 milliGRAM(s) IV Push every 12 hours  gabapentin 300 milliGRAM(s) Oral daily  glucagon  Injectable 1 milliGRAM(s) IntraMuscular once PRN  hydrALAZINE 100 milliGRAM(s) Oral every 8 hours  insulin lispro (HumaLOG) corrective regimen sliding scale   SubCutaneous three times a day before meals  insulin lispro (HumaLOG) corrective regimen sliding scale   SubCutaneous at bedtime  isosorbide   mononitrate ER Tablet (IMDUR) 60 milliGRAM(s) Oral daily  metoprolol succinate ER 25 milliGRAM(s) Oral daily  oxyCODONE    IR 5 milliGRAM(s) Oral every 4 hours PRN  oxyCODONE  ER Tablet 10 milliGRAM(s) Oral every 8 hours  spironolactone 12.5 milliGRAM(s) Oral daily      HEALTH ISSUES - PROBLEM Dx:  Discharge planning issues: Discharge planning issues  Prophylactic measure: Prophylactic measure  DVT (deep venous thrombosis): DVT (deep venous thrombosis)  Lymphedema of arm: Lymphedema of arm  Type II diabetes mellitus: Type II diabetes mellitus  CKD (chronic kidney disease), stage III: CKD (chronic kidney disease), stage III  Hip pain, chronic, right: Hip pain, chronic, right  Hypertension: Hypertension  CAD (coronary artery disease): CAD (coronary artery disease)  Acute on chronic diastolic congestive heart failure: Acute on chronic diastolic congestive heart failure

## 2019-12-13 NOTE — PROGRESS NOTE ADULT - ASSESSMENT
80 y/o [place]ish speaking F PMHx of HFpEF (EF 60-65%, stage II diastolic dysfunction), CAD (s/p NST showing areas of infarct, no prior caths), CKD (Stage III), breast cancer (s/p R mastectomy in 1989, c/b chronic RUE lymphedema, rectal cancer (s/p reversed ileostomy in 2015), RLE DVT (2/2019, on Eliquis), chronic R hip pain, T2DM (A1C 6.7 in 4/2019), HTN, and HLD, presented with bilateral LE swelling and redness, concern for CHF exacerbation.

## 2019-12-14 LAB
CULTURE RESULTS: SIGNIFICANT CHANGE UP
CULTURE RESULTS: SIGNIFICANT CHANGE UP
SPECIMEN SOURCE: SIGNIFICANT CHANGE UP
SPECIMEN SOURCE: SIGNIFICANT CHANGE UP

## 2019-12-30 ENCOUNTER — APPOINTMENT (OUTPATIENT)
Dept: CARDIOLOGY | Facility: CLINIC | Age: 79
End: 2019-12-30

## 2019-12-30 ENCOUNTER — APPOINTMENT (OUTPATIENT)
Dept: INTERNAL MEDICINE | Facility: CLINIC | Age: 79
End: 2019-12-30

## 2020-01-16 ENCOUNTER — APPOINTMENT (OUTPATIENT)
Dept: INTERNAL MEDICINE | Facility: CLINIC | Age: 80
End: 2020-01-16
Payer: MEDICARE

## 2020-01-16 ENCOUNTER — LABORATORY RESULT (OUTPATIENT)
Age: 80
End: 2020-01-16

## 2020-01-16 ENCOUNTER — NON-APPOINTMENT (OUTPATIENT)
Age: 80
End: 2020-01-16

## 2020-01-16 ENCOUNTER — APPOINTMENT (OUTPATIENT)
Dept: CARDIOLOGY | Facility: CLINIC | Age: 80
End: 2020-01-16
Payer: MEDICARE

## 2020-01-16 VITALS
SYSTOLIC BLOOD PRESSURE: 114 MMHG | WEIGHT: 199 LBS | BODY MASS INDEX: 42.93 KG/M2 | OXYGEN SATURATION: 92 % | DIASTOLIC BLOOD PRESSURE: 62 MMHG | TEMPERATURE: 97.9 F | HEART RATE: 90 BPM | RESPIRATION RATE: 12 BRPM | HEIGHT: 57 IN

## 2020-01-16 PROCEDURE — 99214 OFFICE O/P EST MOD 30 MIN: CPT

## 2020-01-16 PROCEDURE — 93000 ELECTROCARDIOGRAM COMPLETE: CPT

## 2020-01-16 PROCEDURE — 99215 OFFICE O/P EST HI 40 MIN: CPT | Mod: 25

## 2020-01-16 RX ORDER — OXYCODONE 10 MG/1
10 TABLET ORAL
Qty: 60 | Refills: 0 | Status: DISCONTINUED | COMMUNITY
Start: 2019-02-04 | End: 2020-01-16

## 2020-01-16 RX ORDER — SULFAMETHOXAZOLE AND TRIMETHOPRIM 800; 160 MG/1; MG/1
800-160 TABLET ORAL
Qty: 14 | Refills: 0 | Status: DISCONTINUED | COMMUNITY
Start: 2019-09-10 | End: 2020-01-16

## 2020-01-16 RX ORDER — OXYCODONE 10 MG/1
10 TABLET ORAL
Refills: 0 | Status: DISCONTINUED | COMMUNITY
End: 2020-01-16

## 2020-01-16 NOTE — REVIEW OF SYSTEMS
[Joint Pain] : joint pain [Unsteady Walking] : ataxia [Depression] : depression [Anxiety] : anxiety [Negative] : Heme/Lymph [Joint Stiffness] : no joint stiffness [Joint Swelling] : no joint swelling [Muscle Weakness] : no muscle weakness [Muscle Pain] : no muscle pain [Back Pain] : no back pain [Headache] : no headache [Dizziness] : no dizziness [Fainting] : no fainting [Confusion] : no confusion [Memory Loss] : no memory loss [de-identified] : NEEDS WALKER ALWAYS

## 2020-01-16 NOTE — REVIEW OF SYSTEMS
[Dyspnea on exertion] : dyspnea during exertion [Lower Ext Edema] : lower extremity edema [Joint Pain] : joint pain [Joint Swelling] : joint swelling [Skin: A Rash] : rash: [Negative] : Heme/Lymph

## 2020-01-16 NOTE — PHYSICAL EXAM
[No Acute Distress] : no acute distress [Well Nourished] : well nourished [Well Developed] : well developed [Well-Appearing] : well-appearing [Normal Sclera/Conjunctiva] : normal sclera/conjunctiva [EOMI] : extraocular movements intact [Normal Outer Ear/Nose] : the outer ears and nose were normal in appearance [PERRL] : pupils equal round and reactive to light [Normal Oropharynx] : the oropharynx was normal [No JVD] : no jugular venous distention [No Lymphadenopathy] : no lymphadenopathy [Thyroid Normal, No Nodules] : the thyroid was normal and there were no nodules present [Supple] : supple [Clear to Auscultation] : lungs were clear to auscultation bilaterally [No Accessory Muscle Use] : no accessory muscle use [No Respiratory Distress] : no respiratory distress  [Normal Rate] : normal rate  [Normal S1, S2] : normal S1 and S2 [Regular Rhythm] : with a regular rhythm [No Abdominal Bruit] : a ~M bruit was not heard ~T in the abdomen [No Carotid Bruits] : no carotid bruits [No Murmur] : no murmur heard [No Palpable Aorta] : no palpable aorta [Non Tender] : non-tender [Soft] : abdomen soft [Non-distended] : non-distended [No HSM] : no HSM [No Masses] : no abdominal mass palpated [Normal Anterior Cervical Nodes] : no anterior cervical lymphadenopathy [Normal Posterior Cervical Nodes] : no posterior cervical lymphadenopathy [Normal Bowel Sounds] : normal bowel sounds [No Spinal Tenderness] : no spinal tenderness [No Joint Swelling] : no joint swelling [No CVA Tenderness] : no CVA  tenderness [No Rash] : no rash [Grossly Normal Strength/Tone] : grossly normal strength/tone [Coordination Grossly Intact] : coordination grossly intact [Normal Affect] : the affect was normal [No Focal Deficits] : no focal deficits [Normal Insight/Judgement] : insight and judgment were intact [Alert and Oriented x3] : oriented to person, place, and time [de-identified] : NEEDS  WALKER TO ASSIST  [de-identified] : RLE PRETIBIAL AREA WITH ERYTHEMA AND SWELLING AND SCRATCH MARKS

## 2020-01-19 LAB
25(OH)D3 SERPL-MCNC: 50 NG/ML
ALBUMIN SERPL ELPH-MCNC: 4.3 G/DL
ALP BLD-CCNC: 73 U/L
ALT SERPL-CCNC: 11 U/L
ANION GAP SERPL CALC-SCNC: 17 MMOL/L
APPEARANCE: ABNORMAL
AST SERPL-CCNC: 17 U/L
BASOPHILS # BLD AUTO: 0.02 K/UL
BASOPHILS NFR BLD AUTO: 0.2 %
BILIRUB SERPL-MCNC: 0.2 MG/DL
BILIRUBIN URINE: NEGATIVE
BLOOD URINE: NEGATIVE
BUN SERPL-MCNC: 57 MG/DL
CALCIUM SERPL-MCNC: 9.5 MG/DL
CHLORIDE SERPL-SCNC: 98 MMOL/L
CHOLEST SERPL-MCNC: 165 MG/DL
CHOLEST/HDLC SERPL: 2.9 RATIO
CO2 SERPL-SCNC: 26 MMOL/L
COLOR: YELLOW
CREAT SERPL-MCNC: 1.93 MG/DL
CREAT SPEC-SCNC: 91 MG/DL
EOSINOPHIL # BLD AUTO: 0.03 K/UL
EOSINOPHIL NFR BLD AUTO: 0.2 %
ERYTHROCYTE [SEDIMENTATION RATE] IN BLOOD BY WESTERGREN METHOD: 87 MM/HR
ESTIMATED AVERAGE GLUCOSE: 160 MG/DL
FERRITIN SERPL-MCNC: 36 NG/ML
FOLATE SERPL-MCNC: 14.9 NG/ML
GLUCOSE QUALITATIVE U: NEGATIVE
GLUCOSE SERPL-MCNC: 236 MG/DL
GLUCOSE SERPL-MCNC: 242 MG/DL
HBA1C MFR BLD HPLC: 7.2 %
HCT VFR BLD CALC: 40.6 %
HDLC SERPL-MCNC: 58 MG/DL
HGB BLD-MCNC: 12.3 G/DL
IMM GRANULOCYTES NFR BLD AUTO: 0.2 %
KETONES URINE: NEGATIVE
LDLC SERPL CALC-MCNC: 60 MG/DL
LEUKOCYTE ESTERASE URINE: ABNORMAL
LYMPHOCYTES # BLD AUTO: 1.72 K/UL
LYMPHOCYTES NFR BLD AUTO: 14.2 %
MAN DIFF?: NORMAL
MCHC RBC-ENTMCNC: 30 PG
MCHC RBC-ENTMCNC: 30.3 GM/DL
MCV RBC AUTO: 99 FL
MICROALBUMIN 24H UR DL<=1MG/L-MCNC: 3.9 MG/DL
MICROALBUMIN/CREAT 24H UR-RTO: 43 MG/G
MONOCYTES # BLD AUTO: 0.87 K/UL
MONOCYTES NFR BLD AUTO: 7.2 %
NEUTROPHILS # BLD AUTO: 9.41 K/UL
NEUTROPHILS NFR BLD AUTO: 78 %
NITRITE URINE: NEGATIVE
PH URINE: 5.5
PLATELET # BLD AUTO: 310 K/UL
POTASSIUM SERPL-SCNC: 4.2 MMOL/L
PROT SERPL-MCNC: 7.2 G/DL
PROTEIN URINE: NORMAL
RBC # BLD: 4.1 M/UL
RBC # FLD: 15.4 %
SODIUM SERPL-SCNC: 141 MMOL/L
SPECIFIC GRAVITY URINE: 1.02
TRIGL SERPL-MCNC: 237 MG/DL
TSH SERPL-ACNC: 1.45 UIU/ML
UROBILINOGEN URINE: NORMAL
VIT B12 SERPL-MCNC: 821 PG/ML
WBC # FLD AUTO: 12.08 K/UL

## 2020-01-24 ENCOUNTER — APPOINTMENT (OUTPATIENT)
Dept: CARDIOLOGY | Facility: CLINIC | Age: 80
End: 2020-01-24
Payer: MEDICARE

## 2020-01-24 VITALS
SYSTOLIC BLOOD PRESSURE: 132 MMHG | HEIGHT: 57 IN | RESPIRATION RATE: 12 BRPM | DIASTOLIC BLOOD PRESSURE: 65 MMHG | BODY MASS INDEX: 42.93 KG/M2 | OXYGEN SATURATION: 95 % | WEIGHT: 199 LBS | HEART RATE: 82 BPM
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
## 2020-01-24 DIAGNOSIS — S80.811A ABRASION, RIGHT LOWER LEG, INITIAL ENCOUNTER: ICD-10-CM

## 2020-01-24 PROCEDURE — 93970 EXTREMITY STUDY: CPT

## 2020-01-24 PROCEDURE — 99214 OFFICE O/P EST MOD 30 MIN: CPT

## 2020-01-24 NOTE — REVIEW OF SYSTEMS
[Lower Ext Edema] : lower extremity edema [Joint Pain] : joint pain [Joint Stiffness] : joint stiffness [Negative] : Heme/Lymph [FreeTextEntry1] : RUE edema

## 2020-01-24 NOTE — ASSESSMENT
[FreeTextEntry1] : Assessment:\par 1.  R DVT\par 2.  Recent hospitalization\par 3.  history of breast cancer\par 4.  History of Rectal CA\par 5.  Right arm lymphedema. \par 6.  Mild pulmonary HTN\par \par Plan\par 1.  Continue with eliquis 2.5mg BID for extended therapy for VTE\par 2.  Duplex today shows no DVT\par 3.  Continue excellent skin hygiene, moisturizer and compression\par 4.  Heart failure management with Dr. Hagan.  \par 5.  no Additional vascular testing at this time. \par 6.  If left heart cath is performed, may hold eliquis for 1 day prior and then resume afterwards.  \par

## 2020-01-24 NOTE — HISTORY OF PRESENT ILLNESS
[FreeTextEntry1] :  Followup visit 1/24/2020\par Was hospitalized for CHF.\par Had a scratch to the right leg\par Here with her daughter\par Venous duplex today shows NO DVT\par She remains on eliquis 2.5mg BID.  \par Seen by Dr. Boyd Jan 16th.\par Asked to followup to assure wound healing.  \par Treated with Azithromycin \par She has completely healed.  Symptoms resolved.  Legs are still a bit swollen.

## 2020-01-24 NOTE — PHYSICAL EXAM
[Normal Appearance] : normal appearance [General Appearance - Well Developed] : well developed [Well Groomed] : well groomed [General Appearance - Well Nourished] : well nourished [No Deformities] : no deformities [General Appearance - In No Acute Distress] : no acute distress [Normal Conjunctiva] : the conjunctiva exhibited no abnormalities [Eyelids - No Xanthelasma] : the eyelids demonstrated no xanthelasmas [Normal Oral Mucosa] : normal oral mucosa [No Oral Cyanosis] : no oral cyanosis [No Oral Pallor] : no oral pallor [Normal Jugular Venous V Waves Present] : normal jugular venous V waves present [Normal Jugular Venous A Waves Present] : normal jugular venous A waves present [No Jugular Venous Amaya A Waves] : no jugular venous amaya A waves [Exaggerated Use Of Accessory Muscles For Inspiration] : no accessory muscle use [Respiration, Rhythm And Depth] : normal respiratory rhythm and effort [Auscultation Breath Sounds / Voice Sounds] : lungs were clear to auscultation bilaterally [Heart Rate And Rhythm] : heart rate and rhythm were normal [Heart Sounds] : normal S1 and S2 [Murmurs] : no murmurs present [Abdomen Soft] : soft [Abdomen Tenderness] : non-tender [Abdomen Mass (___ Cm)] : no abdominal mass palpated [Abnormal Walk] : normal gait [Gait - Sufficient For Exercise Testing] : the gait was sufficient for exercise testing [Skin Color & Pigmentation] : normal skin color and pigmentation [] : no rash [No Venous Stasis] : no venous stasis [Skin Lesions] : no skin lesions [No Skin Ulcers] : no skin ulcer [No Xanthoma] : no  xanthoma was observed [Oriented To Time, Place, And Person] : oriented to person, place, and time [Affect] : the affect was normal [Mood] : the mood was normal [No Anxiety] : not feeling anxious [FreeTextEntry1] : LE edema R>L.  RUE lymphedema.    right knee replacement.  Strong pedal pulses.

## 2020-02-13 ENCOUNTER — INPATIENT (INPATIENT)
Facility: HOSPITAL | Age: 80
LOS: 10 days | Discharge: HOME | DRG: 291 | End: 2020-02-24
Attending: INTERNAL MEDICINE | Admitting: HOSPITALIST
Payer: MEDICARE

## 2020-02-13 ENCOUNTER — RX RENEWAL (OUTPATIENT)
Age: 80
End: 2020-02-13

## 2020-02-13 VITALS
SYSTOLIC BLOOD PRESSURE: 132 MMHG | TEMPERATURE: 98 F | HEART RATE: 77 BPM | RESPIRATION RATE: 18 BRPM | OXYGEN SATURATION: 94 % | WEIGHT: 210.98 LBS | DIASTOLIC BLOOD PRESSURE: 71 MMHG | HEIGHT: 60 IN

## 2020-02-13 DIAGNOSIS — I82.409 ACUTE EMBOLISM AND THROMBOSIS OF UNSPECIFIED DEEP VEINS OF UNSPECIFIED LOWER EXTREMITY: ICD-10-CM

## 2020-02-13 DIAGNOSIS — R82.81 PYURIA: ICD-10-CM

## 2020-02-13 DIAGNOSIS — Z90.49 ACQUIRED ABSENCE OF OTHER SPECIFIED PARTS OF DIGESTIVE TRACT: Chronic | ICD-10-CM

## 2020-02-13 DIAGNOSIS — M79.89 OTHER SPECIFIED SOFT TISSUE DISORDERS: ICD-10-CM

## 2020-02-13 DIAGNOSIS — Z29.9 ENCOUNTER FOR PROPHYLACTIC MEASURES, UNSPECIFIED: ICD-10-CM

## 2020-02-13 DIAGNOSIS — Z93.2 ILEOSTOMY STATUS: Chronic | ICD-10-CM

## 2020-02-13 DIAGNOSIS — I50.9 HEART FAILURE, UNSPECIFIED: ICD-10-CM

## 2020-02-13 DIAGNOSIS — Z90.11 ACQUIRED ABSENCE OF RIGHT BREAST AND NIPPLE: Chronic | ICD-10-CM

## 2020-02-13 DIAGNOSIS — I89.0 LYMPHEDEMA, NOT ELSEWHERE CLASSIFIED: ICD-10-CM

## 2020-02-13 DIAGNOSIS — Z98.89 OTHER SPECIFIED POSTPROCEDURAL STATES: Chronic | ICD-10-CM

## 2020-02-13 DIAGNOSIS — I10 ESSENTIAL (PRIMARY) HYPERTENSION: ICD-10-CM

## 2020-02-13 DIAGNOSIS — L03.90 CELLULITIS, UNSPECIFIED: ICD-10-CM

## 2020-02-13 DIAGNOSIS — Z96.651 PRESENCE OF RIGHT ARTIFICIAL KNEE JOINT: Chronic | ICD-10-CM

## 2020-02-13 DIAGNOSIS — R07.9 CHEST PAIN, UNSPECIFIED: ICD-10-CM

## 2020-02-13 DIAGNOSIS — E78.5 HYPERLIPIDEMIA, UNSPECIFIED: ICD-10-CM

## 2020-02-13 DIAGNOSIS — E11.9 TYPE 2 DIABETES MELLITUS WITHOUT COMPLICATIONS: ICD-10-CM

## 2020-02-13 LAB
ALBUMIN SERPL ELPH-MCNC: 3.8 G/DL — SIGNIFICANT CHANGE UP (ref 3.3–5)
ALP SERPL-CCNC: 78 U/L — SIGNIFICANT CHANGE UP (ref 40–120)
ALT FLD-CCNC: 12 U/L — SIGNIFICANT CHANGE UP (ref 10–45)
ANION GAP SERPL CALC-SCNC: 15 MMOL/L — SIGNIFICANT CHANGE UP (ref 5–17)
APPEARANCE UR: CLEAR — SIGNIFICANT CHANGE UP
AST SERPL-CCNC: 17 U/L — SIGNIFICANT CHANGE UP (ref 10–40)
BACTERIA # UR AUTO: NEGATIVE — SIGNIFICANT CHANGE UP
BASE EXCESS BLDV CALC-SCNC: 2.9 MMOL/L — HIGH (ref -2–2)
BILIRUB SERPL-MCNC: 0.2 MG/DL — SIGNIFICANT CHANGE UP (ref 0.2–1.2)
BILIRUB UR-MCNC: NEGATIVE — SIGNIFICANT CHANGE UP
BUN SERPL-MCNC: 41 MG/DL — HIGH (ref 7–23)
CA-I SERPL-SCNC: 1.21 MMOL/L — SIGNIFICANT CHANGE UP (ref 1.12–1.3)
CALCIUM SERPL-MCNC: 9.3 MG/DL — SIGNIFICANT CHANGE UP (ref 8.4–10.5)
CHLORIDE BLDV-SCNC: 106 MMOL/L — SIGNIFICANT CHANGE UP (ref 96–108)
CHLORIDE SERPL-SCNC: 101 MMOL/L — SIGNIFICANT CHANGE UP (ref 96–108)
CO2 BLDV-SCNC: 30 MMOL/L — SIGNIFICANT CHANGE UP (ref 22–30)
CO2 SERPL-SCNC: 24 MMOL/L — SIGNIFICANT CHANGE UP (ref 22–31)
COLOR SPEC: SIGNIFICANT CHANGE UP
CREAT SERPL-MCNC: 1.86 MG/DL — HIGH (ref 0.5–1.3)
DIFF PNL FLD: NEGATIVE — SIGNIFICANT CHANGE UP
EPI CELLS # UR: 3 /HPF — SIGNIFICANT CHANGE UP
GAS PNL BLDV: 135 MMOL/L — SIGNIFICANT CHANGE UP (ref 135–145)
GAS PNL BLDV: SIGNIFICANT CHANGE UP
GLUCOSE BLDC GLUCOMTR-MCNC: 96 MG/DL — SIGNIFICANT CHANGE UP (ref 70–99)
GLUCOSE BLDV-MCNC: 123 MG/DL — HIGH (ref 70–99)
GLUCOSE SERPL-MCNC: 130 MG/DL — HIGH (ref 70–99)
GLUCOSE UR QL: NEGATIVE — SIGNIFICANT CHANGE UP
HCO3 BLDV-SCNC: 28 MMOL/L — SIGNIFICANT CHANGE UP (ref 21–29)
HCT VFR BLD CALC: 37.6 % — SIGNIFICANT CHANGE UP (ref 34.5–45)
HCT VFR BLDA CALC: 36 % — LOW (ref 39–50)
HGB BLD CALC-MCNC: 11.6 G/DL — SIGNIFICANT CHANGE UP (ref 11.5–15.5)
HGB BLD-MCNC: 11.2 G/DL — LOW (ref 11.5–15.5)
HYALINE CASTS # UR AUTO: 0 /LPF — SIGNIFICANT CHANGE UP (ref 0–2)
KETONES UR-MCNC: NEGATIVE — SIGNIFICANT CHANGE UP
LACTATE BLDV-MCNC: 1.7 MMOL/L — SIGNIFICANT CHANGE UP (ref 0.7–2)
LEUKOCYTE ESTERASE UR-ACNC: ABNORMAL
LIDOCAIN IGE QN: 16 U/L — SIGNIFICANT CHANGE UP (ref 7–60)
MAGNESIUM SERPL-MCNC: 2.1 MG/DL — SIGNIFICANT CHANGE UP (ref 1.6–2.6)
MCHC RBC-ENTMCNC: 29.2 PG — SIGNIFICANT CHANGE UP (ref 27–34)
MCHC RBC-ENTMCNC: 29.8 GM/DL — LOW (ref 32–36)
MCV RBC AUTO: 97.9 FL — SIGNIFICANT CHANGE UP (ref 80–100)
NITRITE UR-MCNC: NEGATIVE — SIGNIFICANT CHANGE UP
NRBC # BLD: 0 /100 WBCS — SIGNIFICANT CHANGE UP (ref 0–0)
NT-PROBNP SERPL-SCNC: 1072 PG/ML — HIGH (ref 0–300)
PCO2 BLDV: 48 MMHG — SIGNIFICANT CHANGE UP (ref 35–50)
PH BLDV: 7.38 — SIGNIFICANT CHANGE UP (ref 7.35–7.45)
PH UR: 6 — SIGNIFICANT CHANGE UP (ref 5–8)
PLATELET # BLD AUTO: 262 K/UL — SIGNIFICANT CHANGE UP (ref 150–400)
PO2 BLDV: 39 MMHG — SIGNIFICANT CHANGE UP (ref 25–45)
POTASSIUM BLDV-SCNC: 3.8 MMOL/L — SIGNIFICANT CHANGE UP (ref 3.5–5.3)
POTASSIUM SERPL-MCNC: 4.2 MMOL/L — SIGNIFICANT CHANGE UP (ref 3.5–5.3)
POTASSIUM SERPL-SCNC: 4.2 MMOL/L — SIGNIFICANT CHANGE UP (ref 3.5–5.3)
PROT SERPL-MCNC: 7.2 G/DL — SIGNIFICANT CHANGE UP (ref 6–8.3)
PROT UR-MCNC: ABNORMAL
RAPID RVP RESULT: SIGNIFICANT CHANGE UP
RBC # BLD: 3.84 M/UL — SIGNIFICANT CHANGE UP (ref 3.8–5.2)
RBC # FLD: 15.2 % — HIGH (ref 10.3–14.5)
RBC CASTS # UR COMP ASSIST: 2 /HPF — SIGNIFICANT CHANGE UP (ref 0–4)
SAO2 % BLDV: 67 % — SIGNIFICANT CHANGE UP (ref 67–88)
SODIUM SERPL-SCNC: 140 MMOL/L — SIGNIFICANT CHANGE UP (ref 135–145)
SP GR SPEC: 1.02 — SIGNIFICANT CHANGE UP (ref 1.01–1.02)
TROPONIN T, HIGH SENSITIVITY RESULT: 27 NG/L — SIGNIFICANT CHANGE UP (ref 0–51)
UROBILINOGEN FLD QL: NEGATIVE — SIGNIFICANT CHANGE UP
WBC # BLD: 10.53 K/UL — HIGH (ref 3.8–10.5)
WBC # FLD AUTO: 10.53 K/UL — HIGH (ref 3.8–10.5)
WBC UR QL: 10 /HPF — HIGH (ref 0–5)

## 2020-02-13 PROCEDURE — 99223 1ST HOSP IP/OBS HIGH 75: CPT | Mod: GC,AI

## 2020-02-13 PROCEDURE — 93010 ELECTROCARDIOGRAM REPORT: CPT

## 2020-02-13 PROCEDURE — 71045 X-RAY EXAM CHEST 1 VIEW: CPT | Mod: 26

## 2020-02-13 PROCEDURE — 99285 EMERGENCY DEPT VISIT HI MDM: CPT

## 2020-02-13 RX ORDER — SPIRONOLACTONE 25 MG/1
12.5 TABLET, FILM COATED ORAL DAILY
Refills: 0 | Status: DISCONTINUED | OUTPATIENT
Start: 2020-02-13 | End: 2020-02-13

## 2020-02-13 RX ORDER — FUROSEMIDE 40 MG
80 TABLET ORAL
Refills: 0 | Status: DISCONTINUED | OUTPATIENT
Start: 2020-02-13 | End: 2020-02-15

## 2020-02-13 RX ORDER — GABAPENTIN 400 MG/1
0 CAPSULE ORAL
Qty: 0 | Refills: 0 | DISCHARGE

## 2020-02-13 RX ORDER — DEXTROSE 50 % IN WATER 50 %
25 SYRINGE (ML) INTRAVENOUS ONCE
Refills: 0 | Status: DISCONTINUED | OUTPATIENT
Start: 2020-02-13 | End: 2020-02-24

## 2020-02-13 RX ORDER — SPIRONOLACTONE 25 MG/1
12.5 TABLET, FILM COATED ORAL DAILY
Refills: 0 | Status: DISCONTINUED | OUTPATIENT
Start: 2020-02-13 | End: 2020-02-24

## 2020-02-13 RX ORDER — INSULIN LISPRO 100/ML
VIAL (ML) SUBCUTANEOUS
Refills: 0 | Status: DISCONTINUED | OUTPATIENT
Start: 2020-02-13 | End: 2020-02-24

## 2020-02-13 RX ORDER — APIXABAN 2.5 MG/1
2.5 TABLET, FILM COATED ORAL
Refills: 0 | Status: DISCONTINUED | OUTPATIENT
Start: 2020-02-13 | End: 2020-02-21

## 2020-02-13 RX ORDER — ATORVASTATIN CALCIUM 80 MG/1
10 TABLET, FILM COATED ORAL AT BEDTIME
Refills: 0 | Status: DISCONTINUED | OUTPATIENT
Start: 2020-02-13 | End: 2020-02-24

## 2020-02-13 RX ORDER — HYDRALAZINE HCL 50 MG
100 TABLET ORAL
Refills: 0 | Status: DISCONTINUED | OUTPATIENT
Start: 2020-02-13 | End: 2020-02-24

## 2020-02-13 RX ORDER — SODIUM CHLORIDE 9 MG/ML
1000 INJECTION, SOLUTION INTRAVENOUS
Refills: 0 | Status: DISCONTINUED | OUTPATIENT
Start: 2020-02-13 | End: 2020-02-24

## 2020-02-13 RX ORDER — DEXTROSE 50 % IN WATER 50 %
15 SYRINGE (ML) INTRAVENOUS ONCE
Refills: 0 | Status: DISCONTINUED | OUTPATIENT
Start: 2020-02-13 | End: 2020-02-24

## 2020-02-13 RX ORDER — BUMETANIDE 0.25 MG/ML
1 INJECTION INTRAMUSCULAR; INTRAVENOUS ONCE
Refills: 0 | Status: COMPLETED | OUTPATIENT
Start: 2020-02-13 | End: 2020-02-13

## 2020-02-13 RX ORDER — INSULIN LISPRO 100/ML
VIAL (ML) SUBCUTANEOUS AT BEDTIME
Refills: 0 | Status: DISCONTINUED | OUTPATIENT
Start: 2020-02-13 | End: 2020-02-24

## 2020-02-13 RX ORDER — METOPROLOL TARTRATE 50 MG
25 TABLET ORAL DAILY
Refills: 0 | Status: DISCONTINUED | OUTPATIENT
Start: 2020-02-13 | End: 2020-02-13

## 2020-02-13 RX ORDER — OXYCODONE HYDROCHLORIDE 5 MG/1
10 TABLET ORAL ONCE
Refills: 0 | Status: DISCONTINUED | OUTPATIENT
Start: 2020-02-13 | End: 2020-02-13

## 2020-02-13 RX ORDER — METOPROLOL TARTRATE 50 MG
25 TABLET ORAL DAILY
Refills: 0 | Status: DISCONTINUED | OUTPATIENT
Start: 2020-02-13 | End: 2020-02-24

## 2020-02-13 RX ORDER — DEXTROSE 50 % IN WATER 50 %
12.5 SYRINGE (ML) INTRAVENOUS ONCE
Refills: 0 | Status: DISCONTINUED | OUTPATIENT
Start: 2020-02-13 | End: 2020-02-24

## 2020-02-13 RX ORDER — ASPIRIN/CALCIUM CARB/MAGNESIUM 324 MG
324 TABLET ORAL ONCE
Refills: 0 | Status: COMPLETED | OUTPATIENT
Start: 2020-02-13 | End: 2020-02-13

## 2020-02-13 RX ORDER — OXYCODONE HYDROCHLORIDE 5 MG/1
10 TABLET ORAL EVERY 12 HOURS
Refills: 0 | Status: DISCONTINUED | OUTPATIENT
Start: 2020-02-13 | End: 2020-02-14

## 2020-02-13 RX ORDER — ISOSORBIDE MONONITRATE 60 MG/1
60 TABLET, EXTENDED RELEASE ORAL DAILY
Refills: 0 | Status: DISCONTINUED | OUTPATIENT
Start: 2020-02-13 | End: 2020-02-24

## 2020-02-13 RX ORDER — GABAPENTIN 400 MG/1
300 CAPSULE ORAL AT BEDTIME
Refills: 0 | Status: DISCONTINUED | OUTPATIENT
Start: 2020-02-13 | End: 2020-02-14

## 2020-02-13 RX ORDER — PREGABALIN 225 MG/1
1 CAPSULE ORAL
Qty: 0 | Refills: 0 | DISCHARGE

## 2020-02-13 RX ORDER — OXYCODONE HYDROCHLORIDE 5 MG/1
10 TABLET ORAL EVERY 12 HOURS
Refills: 0 | Status: DISCONTINUED | OUTPATIENT
Start: 2020-02-13 | End: 2020-02-13

## 2020-02-13 RX ORDER — CHOLECALCIFEROL (VITAMIN D3) 125 MCG
1 CAPSULE ORAL
Qty: 0 | Refills: 0 | DISCHARGE

## 2020-02-13 RX ORDER — GLUCAGON INJECTION, SOLUTION 0.5 MG/.1ML
1 INJECTION, SOLUTION SUBCUTANEOUS ONCE
Refills: 0 | Status: DISCONTINUED | OUTPATIENT
Start: 2020-02-13 | End: 2020-02-24

## 2020-02-13 RX ADMIN — BUMETANIDE 1 MILLIGRAM(S): 0.25 INJECTION INTRAMUSCULAR; INTRAVENOUS at 16:29

## 2020-02-13 RX ADMIN — Medication 324 MILLIGRAM(S): at 13:37

## 2020-02-13 RX ADMIN — ATORVASTATIN CALCIUM 10 MILLIGRAM(S): 80 TABLET, FILM COATED ORAL at 21:07

## 2020-02-13 RX ADMIN — OXYCODONE HYDROCHLORIDE 10 MILLIGRAM(S): 5 TABLET ORAL at 18:00

## 2020-02-13 RX ADMIN — GABAPENTIN 300 MILLIGRAM(S): 400 CAPSULE ORAL at 21:07

## 2020-02-13 RX ADMIN — Medication 100 MILLIGRAM(S): at 21:07

## 2020-02-13 RX ADMIN — OXYCODONE HYDROCHLORIDE 10 MILLIGRAM(S): 5 TABLET ORAL at 17:26

## 2020-02-13 RX ADMIN — Medication 80 MILLIGRAM(S): at 21:07

## 2020-02-13 NOTE — ED PROVIDER NOTE - ATTENDING CONTRIBUTION TO CARE
Attending MD Prince:   I personally have seen and examined this patient.  Physician assistant note reviewed and agree on plan of care and except where noted.  See below for details.     Seen in MW20L, accompanied by daughter, Shauna, brought in by EMS     80F with PMH/PSH including HFpEF (EF 60-65%, stage II diastolic dysfunction), CAD (s/p NST showing areas of infarct, no prior caths), CKD (Stage III), atrophied R kidney, Breast cancer (s/p R mastectomy in 1989, complicated by chronic RUE lymphedema, rectal cancer (s/p low anterior resection (8/10/15), reversed ileostomy in 2015), RLE DVT (2/2019, on Eliquis), chronic R hip pain, T2DM (A1C 6.7 in 4/2019), HTN, and HLD, s/p  knee replacement 2017, recent admission in December for RLE swelling and redness presents to the ED sent in by Pain Management Dr. Rolan Dangelo for low sat.  Daughter reports went for monthly visit as scheduled today but when arrived was found to have low saturation (89%) and was sent to the hospital.  Patient reports that since this morning has felt a little shortness of breath with mild associated chest pain, mid sternal.  Reports also developed cough today, denies productive.  Denies fevers, chills.  Denies runny nose, nasal congestion.  Denies increased swelling in legs or arms but reports increased redness over the last 2-3 days.  Daughter reports that mother has been staying with her brother who is a chronic smoker, patient denies ever smoking.  Reports mid January had two antibiotics for a cat scratch infection in the RLE. On exam, head NCAT, PERRL, FROM at neck, no tenderness to midline palpation, no stepoffs along length of spine, lungs scattered crackles at L base, end expiratory wheezing heard lightly on anterior auscultation, good inspiratory effort, +S1S2, no m/r/g, abdomen soft with +BS, NT, no CVAT, moving all extremities, well healed surgical scars of abdomen, knee, +RLE redness, no warmth    TO BE COMPLETED Attending MD Prince:   I personally have seen and examined this patient.  Physician assistant note reviewed and agree on plan of care and except where noted.  See below for details.     Seen in [facility]20L, accompanied by daughter, Shauna, brought in by EMS     80F with PMH/PSH including HFpEF (EF 60-65%, stage II diastolic dysfunction), CAD (s/p NST showing areas of infarct, no prior caths), CKD (Stage III), atrophied R kidney, Breast cancer (s/p R mastectomy in 1989, complicated by chronic RUE lymphedema, rectal cancer (s/p low anterior resection (8/10/15), reversed ileostomy in 2015), RLE DVT (2/2019, on Eliquis), chronic R hip pain, T2DM (A1C 6.7 in 4/2019), HTN, and HLD, s/p  knee replacement 2017, recent admission in December for RLE swelling and redness presents to the ED sent in by Pain Management Dr. Rolan Dangelo for low sat.  Daughter reports went for monthly visit as scheduled today but when arrived was found to have low saturation (89%) and was sent to the hospital.  Patient reports that since this morning has felt a little shortness of breath with mild associated chest pain, mid sternal.  Reports also developed cough today, denies productive.  Denies fevers, chills.  Denies runny nose, nasal congestion.  Denies increased swelling in legs or arms but reports increased redness over the last 2-3 days.  Daughter reports that mother has been staying with her brother who is a chronic smoker, patient denies ever smoking.  Reports mid January had two antibiotics for a cat scratch infection in the RLE. On exam, head NCAT, PERRL, FROM at neck, no tenderness to midline palpation, no stepoffs along length of spine, lungs scattered crackles at L base, end expiratory wheezing heard lightly on anterior auscultation, good inspiratory effort, no increased work of breathing, retractions, +S1S2, no m/r/g, abdomen soft with +BS, NT, no CVAT, moving all extremities, well healed surgical scars of abdomen, knee, +RLE redness, no warmth; A/P: 80F hypoxic, DDx includes HF, malignancy/effusion, viral URI/PNA, will obtain labs, CXR, EKG, pain control, reassess

## 2020-02-13 NOTE — H&P ADULT - HISTORY OF PRESENT ILLNESS
81 y/o Belarusian speaking F PMHx of HFpEF (EF 60-65%, stage II diastolic dysfunction), CAD (s/p NST showing areas of infarct, no prior caths), CKD (Stage III), Breast cancer (s/p R mastectomy in 1989, c/b chronic RUE lymphedema, rectal cancer (s/p reversed ileostomy in 2015), RLE DVT (2/2019, on Eliquis), chronic R hip pain, T2DM (A1C 6.7 in 12/2019), HTN, HLD, s/p knee replacement 2017, recent admission in December for RLE swelling and redness treated for CHF exacerbation presented to the ED with SOB and chest pain. Based on chart review and patient interview, patient went to her monthly pain management visit with Dr. Gongora today and was found to have low saturation to 89% and was sent to the hospital. She reports that she felt short of breath this morning with associated mild, nonradiating, chest pain     s/p knee replacement 2017, recent admission in December for RLE swelling and redness presents to the ED sent in by Pain Management Dr. Rolan Dangelo for low sat.  Daughter reports went for monthly visit as scheduled today but when arrived was found to have low saturation (89%) and was sent to the hospital.  Patient reports that since this morning has felt a little shortness of breath with mild associated chest pain, mid sternal.  Reports also developed cough today, denies productive.  Denies fevers, chills.  Denies runny nose, nasal congestion.  Denies increased swelling in legs or arms but reports increased redness over the last 2-3 days.  Daughter reports that mother has been staying with her brother who is a chronic smoker, patient denies ever smoking.  Reports mid January had two antibiotics for a cat scratch infection in the RLE. On exam, head NCAT, PERRL, FROM at neck, no tenderness to midline palpation, no stepoffs along length of spine, lungs scattered crackles at L base, end expiratory wheezing heard lightly on anterior auscultation, good inspiratory effort, +S1S2, no m/r/g, abdomen soft with +BS, NT, no CVAT, moving all extremities, well healed surgical scars of abdomen, knee, +RLE redness, no warmth 81 y/o Turkmen speaking F PMHx of HFpEF (EF 60-65%, stage II diastolic dysfunction), CAD (s/p NST showing areas of infarct, no prior caths), CKD (Stage III), Breast cancer (s/p R mastectomy in 1989, c/b chronic RUE lymphedema, rectal cancer (s/p reversed ileostomy in 2015), RLE DVT (2/2019, on Eliquis), chronic R hip pain, T2DM (A1C 6.7 in 12/2019), HTN, HLD, s/p knee replacement 2017, recent admission in December for RLE swelling and redness treated for CHF exacerbation presented to the ED with SOB and chest pain. Based on chart review and patient interview, patient went to her monthly pain management visit with Dr. Gongora today and was found to have low saturation to 89% and was sent to the hospital. She reports that she felt short of breath this morning with associated mild, nonradiating, chest pain which resolved spontaneously in about 20 mins. Initially reported having nonproductive cough but denies fevers, chills, runny nose. Denies increased swelling in legs or arms but reports increased redness over the last 2-3 days. Denies recent trauma or cat scratch. Daughter reports that mother has been staying with her brother who is a chronic smoker, patient denies ever smoking.  Reports mid January had two antibiotics for a cat scratch infection in the RLE. 81 y/o Yakut speaking F PMHx of HFpEF (EF 60-65%, stage II diastolic dysfunction), CAD (s/p NST showing areas of infarct, no prior caths), CKD (Stage III), Breast cancer (s/p R mastectomy in 1989, c/b chronic RUE lymphedema, rectal cancer (s/p reversed ileostomy in 2015), RLE DVT (2/2019, on Eliquis), chronic R hip pain, T2DM (A1C 6.7 in 12/2019), HTN, HLD, s/p knee replacement 2017, recent admission in December for RLE swelling and redness treated for CHF exacerbation presented to the ED with SOB and chest pain. Based on chart review and patient interview, patient went to her monthly pain management visit with Dr. Gongora today and was found to have low saturation to 89% and was sent to the hospital. She reports that she felt short of breath this morning with associated mild, nonradiating, chest pain which resolved spontaneously in about 20 mins. Initially reported having nonproductive cough but denies fevers, chills, runny nose. Denies increased swelling in legs or arms but reports increased redness over the last 2-3 days. Denies recent trauma or cat scratch. Daughter reports that mother has been staying with her brother who is a chronic smoker, patient denies ever smoking.  Reports mid January had two antibiotics for a cat scratch infection in the RLE. Denies recent change in medication, sick contact, recent travels.

## 2020-02-13 NOTE — H&P ADULT - PROBLEM SELECTOR PLAN 10
-DVT: heparin subq q8  -Diet: consistent carb  -Dispo: PT eval    Discharge planning  Transitions of Care Status:  1.  Name of PCP:  2.  PCP Contacted on Admission: [ ] Y    [ ] N    3.  PCP contacted at Discharge: [ ] Y    [ ] N    [ ] N/A  4.  Post-Discharge Appointment Date and Location:  5.  Summary of Handoff given to PCP: -DVT: on eliquis  -Diet: consistent carb/DASH  -Dispo: PT eval    Discharge planning  Transitions of Care Status:  1.  Name of PCP:  2.  PCP Contacted on Admission: [ ] Y    [ ] N    3.  PCP contacted at Discharge: [ ] Y    [ ] N    [ ] N/A  4.  Post-Discharge Appointment Date and Location:  5.  Summary of Handoff given to PCP:

## 2020-02-13 NOTE — ED ADULT NURSE REASSESSMENT NOTE - NS ED NURSE REASSESS COMMENT FT1
Report taken from Brandon RN in purple area. Pt resting in stretcher with family at bedside in NAD and VSS. Pt O2 sat 99% on 2L nasal cannula and pt reports feeling better with oxygen on. No increased work of breathing noted. Pt and family updated on plan of care- pending RVP. Report taken from Brandon RN in purple area. Pt resting in stretcher with family at bedside in NAD and VSS. Pt O2 sat 99% on 2L nasal cannula and pt reports feeling better with oxygen on. No increased work of breathing noted. Cardiac monitor in place. Pt and family updated on plan of care- pending RVP.

## 2020-02-13 NOTE — H&P ADULT - PROBLEM SELECTOR PLAN 6
02/2019 on eliquis, duplex from 12/2019 showed no DVT  -continue eliquis for now and outpt f/u regarding when to stop

## 2020-02-13 NOTE — ED ADULT TRIAGE NOTE - CHIEF COMPLAINT QUOTE
difficulty breathing and CP x few days. found have low SpO2 at pain management MD. history of CHF, reports weight gain and swollen extremities.

## 2020-02-13 NOTE — ED CLERICAL - NS ED CLERK NOTE PRE-ARRIVAL INFORMATION; ADDITIONAL PRE-ARRIVAL INFORMATION
CC/Reason For referral:sob, chf decreased sats  Preferred Consultant(if applicable):na  Who admits for you (if needed):na  Do you have documents you would like to fax over?na  Would you still like to speak to an ED attending?na

## 2020-02-13 NOTE — H&P ADULT - PROBLEM SELECTOR PLAN 4
likely atypical CP, resolved spontaneously  -EKG no change from prior  -continue home isosorbide mononitrate likely atypical CP, resolved spontaneously, neg trops  -EKG no change from prior  -continue home isosorbide mononitrate

## 2020-02-13 NOTE — H&P ADULT - PROBLEM SELECTOR PLAN 1
hypoxic to 89% at outpt clinic, SOB today, b/l basilar crackles, significant peripheral edema on home bumex 1mg BID. Now satting well ORA  -s/p 1mg Bumex in the ED, CXR clear, RVP neg  -start bumex 1mg TID  -strict I&Os  -monitor on tele hypoxic to 89% at outpt clinic, SOB today, b/l basilar crackles, significant peripheral edema on home bumex 1mg BID. ProBNP 1072. Now satting well ORA  -s/p 1mg Bumex in the ED, CXR clear, RVP neg  -start bumex 1mg TID  -strict I&Os  -monitor on tele hypoxic to 89% at outpt clinic, SOB today, b/l basilar crackles, significant peripheral edema on home bumex 1mg BID. ProBNP 1072. Now satting well ORA  -s/p 1mg Bumex in the ED, CXR clear, RVP neg  -start lasix IV 80   -check pro bnp upon dc  -strict I&Os  -monitor on tele hypoxic to 89% at outpt clinic, SOB today, b/l basilar crackles, significant peripheral edema on home bumex 1mg BID. ProBNP 1072. Trop neg. Now satting well ORA  -s/p 1mg Bumex in the ED, CXR clear, RVP neg  -start lasix IV 80 BID  -check pro bnp upon dc  -strict I&Os  -monitor on tele hypoxic to 89% at outpt clinic, SOB today, b/l basilar crackles, significant peripheral edema on home bumex 1mg BID. ProBNP 1072. Trop neg. Now satting well ORA  -s/p 1mg Bumex in the ED, CXR clear, RVP neg  -start lasix IV 80 BID  -check pro bnp upon dc  -strict I&Os  -monitor on tele  -continue home lopressor 25, spironolactone 12.5 qd

## 2020-02-13 NOTE — H&P ADULT - NSHPPHYSICALEXAM_GEN_ALL_CORE
GENERAL: NAD, well-developed  HEAD:  Atraumatic, Normocephalic  EYES: EOMI, conjunctiva and sclera clear  NECK: Supple  CHEST/LUNG: non labored breathing, b/l basilar crackles  HEART: Regular rate and rhythm; No murmurs, rubs, or gallops  ABDOMEN: Soft, Nontender, Nondistended; Bowel sounds present  EXTREMITIES:  2+ Peripheral Pulses. 2+ b/l LE edema (R>L)  PSYCH: AAOx3  NEUROLOGY: non-focal  SKIN: erythema and warm to touch on RLE>LLE

## 2020-02-13 NOTE — H&P ADULT - NSHPREVIEWOFSYSTEMS_GEN_ALL_CORE
CONSTITUTIONAL: No fevers or chills  EYES/ENT: No rhinorrhea   NECK: No pain or stiffness  RESPIRATORY: No cough, wheezing, hemoptysis; No shortness of breath  CARDIOVASCULAR: No chest pain or palpitations  GASTROINTESTINAL: No abdominal or epigastric pain. No nausea, vomiting, or hematemesis; No diarrhea or constipation. No melena or hematochezia.  GENITOURINARY: No dysuria, frequency or hematuria  NEUROLOGICAL: No numbness or weakness  SKIN: increased redness of R leg

## 2020-02-13 NOTE — H&P ADULT - PROBLEM SELECTOR PLAN 2
Increased erythema on RLE for the past 3 days, more tender than LLE. Duplex from 12/2019 neg for DVT  -start bactrim Increased erythema on RLE for the past 3 days, more tender than LLE. Duplex from 12/2019 neg for DVT. Cellulitis vs chronic venous stasis in the setting of CHF vs lymphedema  -monitor off antibiotics  -start lasix 80 IV BID

## 2020-02-13 NOTE — H&P ADULT - PROBLEM SELECTOR PLAN 5
A1C 6.7 in 12/2019 on home glimperide and januvia  -IIS while inpatient    CKD  baseline Cr 1.5s-1.8s, now close to baseline  -monitor BMP    Chronic hip pain  on home oxycontin 10 and gralise 600qd  -resume home meds A1C 6.7 in 12/2019 on home glimperide and januvia  -IIS while inpatient    CKD  baseline Cr 1.5s-1.8s, now close to baseline  -monitor BMP    Chronic hip pain  on home oxycontin 10 and gralise 600qd  -given GFR, will continue oxycontin 10 and gabapentin 150 BID for now A1C 6.7 in 12/2019 on home glimperide and januvia  -IIS while inpatient    CKD  baseline Cr 1.5s-1.8s, now close to baseline  -monitor BMP    Chronic hip pain  on home oxycontin 10 and gralise 600qd  -given GFR, will continue oxycodone 10 and gabapentin 300qhs for now

## 2020-02-13 NOTE — H&P ADULT - NSHPLABSRESULTS_GEN_ALL_CORE
11.2   10.53 )-----------( 262      ( 2020 13:40 )             37.6   02-13    140  |  101  |  41<H>  ----------------------------<  130<H>  4.2   |  24  |  1.86<H>    Ca    9.3      2020 13:40  Mg     2.1     02-    TPro  7.2  /  Alb  3.8  /  TBili  0.2  /  DBili  x   /  AST  17  /  ALT  12  /  AlkPhos  78  02-  Urinalysis Basic - ( 2020 14:19 )    Color: Light Yellow / Appearance: Clear / S.022 / pH: x  Gluc: x / Ketone: Negative  / Bili: Negative / Urobili: Negative   Blood: x / Protein: 30 mg/dL / Nitrite: Negative   Leuk Esterase: Moderate / RBC: 2 /hpf / WBC 10 /HPF   Sq Epi: x / Non Sq Epi: 3 /hpf / Bacteria: Negative    Rapid RVP Result: NotDetec: This Respiratory Panel uses polymerase chain reaction (PCR) to detect for  adenovirus; coronavirus (HKU1, NL63, 229E, OC43); human metapneumovirus  (hMPV); human enterovirus/rhinovirus (Entero/RV); influenza A; influenza  A/H1; influenza A/H3; influenza A/H1-2009; influenza B; parainfluenza  viruses 1, 2, 3, 4; respiratory syncytial virus; Mycoplasma pneumoniae;  and Chlamydophila pneumoniae. (20 @ 16:24)  < from: Xray Chest 1 View AP/PA (20 @ 14:56) >    IMPRESSION:  No active pulmonary disease.    < end of copied text >    < from: [facility] Duplex Lower Ext Vein Scan, Bilat (19 @ 12:44) >    IMPRESSION:     No evidence of deep venous thrombosis in either lower extremity.    < end of copied text >

## 2020-02-13 NOTE — ED ADULT NURSE NOTE - OBJECTIVE STATEMENT
Pt came in because her oxygen saturation in PMD's office is 89%. Pt oxygen level in ER on room air is 93-94%; pt with 99% saturation on 1 l/NC. Breathing easy and non labored. No acute respiratory distress. Pt with dyspnea on exertion. Pt mildly anxious because today is her birthday and she is in the hospital. Emotional support offered. Pt able to move all extremities. Lower extremities reddened and swollen but according to the daughter it's not new. +lymphedema  right arm. Pt's skin warm and dry to touch. No chills. no diaphoresis.

## 2020-02-13 NOTE — H&P ADULT - ASSESSMENT
79 y/o Divehi speaking F PMHx of HFpEF (EF 60-65%, stage II diastolic dysfunction), CAD (s/p NST showing areas of infarct, no prior caths), CKD (Stage III), Breast cancer (s/p R mastectomy in 1989, c/b chronic RUE lymphedema, rectal cancer (s/p reversed ileostomy in 2015), RLE DVT (2/2019, on Eliquis), chronic R hip pain, T2DM (A1C 6.7 in 12/2019), HTN, HLD, s/p knee replacement 2017, recent admission in December for RLE swelling and redness treated for CHF exacerbation presented to the ED with SOB and chest pain, admitted for likely CHF exacerbation.

## 2020-02-13 NOTE — ED PROVIDER NOTE - CARE PLAN
Principal Discharge DX:	Acute on chronic congestive heart failure, unspecified heart failure type  Secondary Diagnosis:	Chest pain, unspecified type

## 2020-02-13 NOTE — ED PROVIDER NOTE - OBJECTIVE STATEMENT
81 y/o Kiswahili speaking F PMHx of HFpEF (EF 60-65%, stage II diastolic dysfunction), CAD (s/p NST showing areas of infarct, no prior caths), CKD (Stage III), Breast cancer (s/p R mastectomy in 1989, c/b chronic RUE lymphedema, rectal cancer (s/p reversed ileostomy in 2015), RLE DVT (2/2019, on Eliquis), chronic R hip pain, T2DM (A1C 6.7 in 4/2019), HTN, and HLD,

## 2020-02-14 LAB
ANION GAP SERPL CALC-SCNC: 15 MMOL/L — SIGNIFICANT CHANGE UP (ref 5–17)
ANION GAP SERPL CALC-SCNC: 16 MMOL/L — SIGNIFICANT CHANGE UP (ref 5–17)
BASOPHILS # BLD AUTO: 0.02 K/UL — SIGNIFICANT CHANGE UP (ref 0–0.2)
BASOPHILS NFR BLD AUTO: 0.2 % — SIGNIFICANT CHANGE UP (ref 0–2)
BUN SERPL-MCNC: 39 MG/DL — HIGH (ref 7–23)
BUN SERPL-MCNC: 40 MG/DL — HIGH (ref 7–23)
CALCIUM SERPL-MCNC: 9.3 MG/DL — SIGNIFICANT CHANGE UP (ref 8.4–10.5)
CALCIUM SERPL-MCNC: 9.4 MG/DL — SIGNIFICANT CHANGE UP (ref 8.4–10.5)
CHLORIDE SERPL-SCNC: 100 MMOL/L — SIGNIFICANT CHANGE UP (ref 96–108)
CHLORIDE SERPL-SCNC: 96 MMOL/L — SIGNIFICANT CHANGE UP (ref 96–108)
CO2 SERPL-SCNC: 27 MMOL/L — SIGNIFICANT CHANGE UP (ref 22–31)
CO2 SERPL-SCNC: 29 MMOL/L — SIGNIFICANT CHANGE UP (ref 22–31)
CREAT ?TM UR-MCNC: 20 MG/DL — SIGNIFICANT CHANGE UP
CREAT SERPL-MCNC: 2.07 MG/DL — HIGH (ref 0.5–1.3)
CREAT SERPL-MCNC: 2.22 MG/DL — HIGH (ref 0.5–1.3)
EOSINOPHIL # BLD AUTO: 0.01 K/UL — SIGNIFICANT CHANGE UP (ref 0–0.5)
EOSINOPHIL NFR BLD AUTO: 0.1 % — SIGNIFICANT CHANGE UP (ref 0–6)
GLUCOSE BLDC GLUCOMTR-MCNC: 137 MG/DL — HIGH (ref 70–99)
GLUCOSE BLDC GLUCOMTR-MCNC: 148 MG/DL — HIGH (ref 70–99)
GLUCOSE BLDC GLUCOMTR-MCNC: 159 MG/DL — HIGH (ref 70–99)
GLUCOSE BLDC GLUCOMTR-MCNC: 181 MG/DL — HIGH (ref 70–99)
GLUCOSE SERPL-MCNC: 143 MG/DL — HIGH (ref 70–99)
GLUCOSE SERPL-MCNC: 145 MG/DL — HIGH (ref 70–99)
HBA1C BLD-MCNC: 6.8 % — HIGH (ref 4–5.6)
HCT VFR BLD CALC: 37.9 % — SIGNIFICANT CHANGE UP (ref 34.5–45)
HGB BLD-MCNC: 11.3 G/DL — LOW (ref 11.5–15.5)
IMM GRANULOCYTES NFR BLD AUTO: 0.5 % — SIGNIFICANT CHANGE UP (ref 0–1.5)
LYMPHOCYTES # BLD AUTO: 1.31 K/UL — SIGNIFICANT CHANGE UP (ref 1–3.3)
LYMPHOCYTES # BLD AUTO: 14.1 % — SIGNIFICANT CHANGE UP (ref 13–44)
MAGNESIUM SERPL-MCNC: 2.1 MG/DL — SIGNIFICANT CHANGE UP (ref 1.6–2.6)
MAGNESIUM SERPL-MCNC: 2.1 MG/DL — SIGNIFICANT CHANGE UP (ref 1.6–2.6)
MCHC RBC-ENTMCNC: 29 PG — SIGNIFICANT CHANGE UP (ref 27–34)
MCHC RBC-ENTMCNC: 29.8 GM/DL — LOW (ref 32–36)
MCV RBC AUTO: 97.4 FL — SIGNIFICANT CHANGE UP (ref 80–100)
MONOCYTES # BLD AUTO: 0.71 K/UL — SIGNIFICANT CHANGE UP (ref 0–0.9)
MONOCYTES NFR BLD AUTO: 7.6 % — SIGNIFICANT CHANGE UP (ref 2–14)
NEUTROPHILS # BLD AUTO: 7.2 K/UL — SIGNIFICANT CHANGE UP (ref 1.8–7.4)
NEUTROPHILS NFR BLD AUTO: 77.5 % — HIGH (ref 43–77)
NRBC # BLD: 0 /100 WBCS — SIGNIFICANT CHANGE UP (ref 0–0)
PHOSPHATE SERPL-MCNC: 3.7 MG/DL — SIGNIFICANT CHANGE UP (ref 2.5–4.5)
PHOSPHATE SERPL-MCNC: 3.9 MG/DL — SIGNIFICANT CHANGE UP (ref 2.5–4.5)
PLATELET # BLD AUTO: 239 K/UL — SIGNIFICANT CHANGE UP (ref 150–400)
POTASSIUM SERPL-MCNC: 3.8 MMOL/L — SIGNIFICANT CHANGE UP (ref 3.5–5.3)
POTASSIUM SERPL-MCNC: 3.9 MMOL/L — SIGNIFICANT CHANGE UP (ref 3.5–5.3)
POTASSIUM SERPL-SCNC: 3.8 MMOL/L — SIGNIFICANT CHANGE UP (ref 3.5–5.3)
POTASSIUM SERPL-SCNC: 3.9 MMOL/L — SIGNIFICANT CHANGE UP (ref 3.5–5.3)
RBC # BLD: 3.89 M/UL — SIGNIFICANT CHANGE UP (ref 3.8–5.2)
RBC # FLD: 15.1 % — HIGH (ref 10.3–14.5)
SODIUM SERPL-SCNC: 141 MMOL/L — SIGNIFICANT CHANGE UP (ref 135–145)
SODIUM SERPL-SCNC: 142 MMOL/L — SIGNIFICANT CHANGE UP (ref 135–145)
WBC # BLD: 9.3 K/UL — SIGNIFICANT CHANGE UP (ref 3.8–10.5)
WBC # FLD AUTO: 9.3 K/UL — SIGNIFICANT CHANGE UP (ref 3.8–10.5)

## 2020-02-14 PROCEDURE — 99233 SBSQ HOSP IP/OBS HIGH 50: CPT | Mod: GC

## 2020-02-14 RX ORDER — GABAPENTIN 400 MG/1
300 CAPSULE ORAL
Refills: 0 | Status: DISCONTINUED | OUTPATIENT
Start: 2020-02-14 | End: 2020-02-24

## 2020-02-14 RX ORDER — OXYCODONE HYDROCHLORIDE 5 MG/1
10 TABLET ORAL EVERY 8 HOURS
Refills: 0 | Status: DISCONTINUED | OUTPATIENT
Start: 2020-02-14 | End: 2020-02-17

## 2020-02-14 RX ORDER — OXYCODONE HYDROCHLORIDE 5 MG/1
10 TABLET ORAL EVERY 8 HOURS
Refills: 0 | Status: DISCONTINUED | OUTPATIENT
Start: 2020-02-14 | End: 2020-02-14

## 2020-02-14 RX ADMIN — OXYCODONE HYDROCHLORIDE 10 MILLIGRAM(S): 5 TABLET ORAL at 06:30

## 2020-02-14 RX ADMIN — Medication 1: at 12:10

## 2020-02-14 RX ADMIN — SPIRONOLACTONE 12.5 MILLIGRAM(S): 25 TABLET, FILM COATED ORAL at 05:31

## 2020-02-14 RX ADMIN — OXYCODONE HYDROCHLORIDE 10 MILLIGRAM(S): 5 TABLET ORAL at 12:09

## 2020-02-14 RX ADMIN — APIXABAN 2.5 MILLIGRAM(S): 2.5 TABLET, FILM COATED ORAL at 17:17

## 2020-02-14 RX ADMIN — Medication 80 MILLIGRAM(S): at 05:31

## 2020-02-14 RX ADMIN — ATORVASTATIN CALCIUM 10 MILLIGRAM(S): 80 TABLET, FILM COATED ORAL at 23:13

## 2020-02-14 RX ADMIN — GABAPENTIN 300 MILLIGRAM(S): 400 CAPSULE ORAL at 17:17

## 2020-02-14 RX ADMIN — Medication 100 MILLIGRAM(S): at 05:31

## 2020-02-14 RX ADMIN — OXYCODONE HYDROCHLORIDE 10 MILLIGRAM(S): 5 TABLET ORAL at 12:17

## 2020-02-14 RX ADMIN — ISOSORBIDE MONONITRATE 60 MILLIGRAM(S): 60 TABLET, EXTENDED RELEASE ORAL at 12:10

## 2020-02-14 RX ADMIN — OXYCODONE HYDROCHLORIDE 10 MILLIGRAM(S): 5 TABLET ORAL at 23:13

## 2020-02-14 RX ADMIN — APIXABAN 2.5 MILLIGRAM(S): 2.5 TABLET, FILM COATED ORAL at 05:31

## 2020-02-14 RX ADMIN — Medication 80 MILLIGRAM(S): at 17:17

## 2020-02-14 RX ADMIN — OXYCODONE HYDROCHLORIDE 10 MILLIGRAM(S): 5 TABLET ORAL at 22:15

## 2020-02-14 RX ADMIN — Medication 100 MILLIGRAM(S): at 17:17

## 2020-02-14 RX ADMIN — Medication 25 MILLIGRAM(S): at 05:31

## 2020-02-14 RX ADMIN — OXYCODONE HYDROCHLORIDE 10 MILLIGRAM(S): 5 TABLET ORAL at 05:31

## 2020-02-14 NOTE — PATIENT PROFILE ADULT - DOES PATIENT HAVE ADVANCE DIRECTIVE
yes Breath sounds are clear, no distress present, no wheeze, rales, rhonchi or tachypnea. Normal rate and effort.

## 2020-02-14 NOTE — PROGRESS NOTE ADULT - ASSESSMENT
81 y/o Korean speaking F PMHx of HFpEF (EF 60-65%, stage II diastolic dysfunction), CAD (s/p NST showing areas of infarct, no prior caths), CKD (Stage III), Breast cancer (s/p R mastectomy in 1989, c/b chronic RUE lymphedema, rectal cancer (s/p reversed ileostomy in 2015), RLE DVT (2/2019, on Eliquis), chronic R hip pain, T2DM (A1C 6.7 in 12/2019), HTN, HLD, s/p knee replacement 2017, recent admission in December for RLE swelling and redness treated for CHF exacerbation presented to the ED with SOB and chest pain, admitted for likely CHF exacerbation.

## 2020-02-14 NOTE — PATIENT PROFILE ADULT - BRADEN ACTIVITY
Attempt made to reach patient today.  LVM and sent letter asking that he call to schedule hepatology consult.   (2) chairfast

## 2020-02-14 NOTE — PHYSICAL THERAPY INITIAL EVALUATION ADULT - ADDITIONAL COMMENTS
Pt is Tamaz[place] speaking,  services were utilized. Pt states she is temporarily living with her son. There are no steps to enter the house and none once inside. At baseline, she states she only ambulates household distances and uses a rolling walker. Her son assists her with ADL's.

## 2020-02-14 NOTE — PROGRESS NOTE ADULT - PROBLEM SELECTOR PLAN 10
-DVT: on eliquis  -Diet: consistent carb/DASH  -Dispo: PT eval    Discharge planning  Transitions of Care Status:  1.  Name of PCP:  2.  PCP Contacted on Admission: [ ] Y    [ ] N    3.  PCP contacted at Discharge: [ ] Y    [ ] N    [ ] N/A  4.  Post-Discharge Appointment Date and Location:  5.  Summary of Handoff given to PCP: -DVT: on eliquis  -Diet: consistent carb/DASH  -Dispo: PT eval--home, no skilled PT needed    Discharge planning  Transitions of Care Status:  1.  Name of PCP:  2.  PCP Contacted on Admission: [ ] Y    [ ] N    3.  PCP contacted at Discharge: [ ] Y    [ ] N    [ ] N/A  4.  Post-Discharge Appointment Date and Location:  5.  Summary of Handoff given to PCP: -DVT: on eliquis  -Diet: consistent carb/DASH  -Dispo: PT eval--home, no skilled PT needed    Discharge planning  Transitions of Care Status:  1.  Name of PCP: Dr. Dawkins  2.  PCP Contacted on Admission: [X] Y    [ ] N    3.  PCP contacted at Discharge: [ ] Y    [ ] N    [ ] N/A  4.  Post-Discharge Appointment Date and Location:  5.  Summary of Handoff given to PCP:

## 2020-02-14 NOTE — PROGRESS NOTE ADULT - ATTENDING COMMENTS
Patient seen and examined  - Acute diastolic HF: previous discharge weight in Dec 2019 around 87kg now 95 on admission (on floor now 92kg).  BNP similar to admission last time.   Will cont with Lasix 80mg IV BID (home Bumex 1mg BID) and monitor daily weight, I+Os, Cr  Will d/w family re: compliance with diet and meds.   - TAO on CKD3: Cr increased today from 1.8 to 2.2. will cont to monitor on diuresis. Patient only has received one dose of 80mg IV x1 last night.   Will monitor I+Os. and Cr. monitor lytes  - Lymphedema: chronic. RLE more red and swollen. unclear chronicity. Will cont with diuresis and monitor. If no change can consider ABx although hard to determine if cellulitis. elevate leg.   - h/o DVT: cont with eliquis.

## 2020-02-14 NOTE — PROGRESS NOTE ADULT - PROBLEM SELECTOR PLAN 1
hypoxic to 89% at outpt clinic, SOB today, b/l basilar crackles, significant peripheral edema on home bumex 1mg BID. ProBNP 1072. Trop neg. Now satting well ORA  -s/p 1mg Bumex in the ED, CXR clear, RVP neg  -start lasix IV 80 BID  -check pro bnp upon dc  -strict I&Os  -monitor on tele  -continue home lopressor 25, spironolactone 12.5 qd hypoxic to 89% at outpt clinic, SOB today, b/l basilar crackles, significant peripheral edema on home bumex 1mg BID. ProBNP 1072. Trop neg. Now satting well on NC2L  -s/p 1mg Bumex in the ED, CXR clear, RVP neg  -c/w lasix IV 80 BID for now  -check pro bnp upon dc  -strict I&Os  -monitor on tele: sinu 60-70s  -continue home lopressor 25, spironolactone 12.5 qd  -check PM BMP for electrolyte abnormality

## 2020-02-14 NOTE — PROGRESS NOTE ADULT - PROBLEM SELECTOR PLAN 4
likely atypical CP, resolved spontaneously, neg trops  -EKG no change from prior  -continue home isosorbide mononitrate

## 2020-02-14 NOTE — PHYSICAL THERAPY INITIAL EVALUATION ADULT - PERTINENT HX OF CURRENT PROBLEM, REHAB EVAL
Pt is an 79 y/o F with PMH of HFpEF (EF 60-65%, stage II diastolic dysfunction), CAD (s/p NST showing areas of infarct, no prior caths), CKD (Stage III), breast cancer (s/p R mastectomy in 1989, c/b chronic RUE lymphedema, rectal cancer (s/p reversed ileostomy in 2015), RLE DVT (2/2019, on Eliquis), T2DM (A1C 6.7 in 12/2019), HTN presented to the ED with SOB and chest pain. Patient went to her monthly pain management visit and was found to have low saturation (89%) & sent to the hospital. CONT..

## 2020-02-14 NOTE — PHYSICAL THERAPY INITIAL EVALUATION ADULT - PRECAUTIONS/LIMITATIONS, REHAB EVAL
She reported that she felt SOB with associated mild, nonradiating, chest pain which resolved spontaneously in about 20 mins. Initially reported having nonproductive cough but denied fevers, chills, runny nose. Denied increased swelling in legs or arms but reports increased redness over the last 2-3 days. Admitted for likely CHF exacerbation.

## 2020-02-14 NOTE — PROGRESS NOTE ADULT - SUBJECTIVE AND OBJECTIVE BOX
PROGRESS NOTE:     CONTACT INFO:   Sanford Perry (Xiao)   NS: [phone]    Patient is a 80y old  Female who presents with a chief complaint of SOB (2020 18:13)      SUBJECTIVE / OVERNIGHT EVENTS:    ADDITIONAL REVIEW OF SYSTEMS:    MEDICATIONS  (STANDING):  apixaban 2.5 milliGRAM(s) Oral two times a day  atorvastatin 10 milliGRAM(s) Oral at bedtime  dextrose 5%. 1000 milliLiter(s) (50 mL/Hr) IV Continuous <Continuous>  dextrose 50% Injectable 12.5 Gram(s) IV Push once  dextrose 50% Injectable 25 Gram(s) IV Push once  dextrose 50% Injectable 25 Gram(s) IV Push once  furosemide   Injectable 80 milliGRAM(s) IV Push two times a day  gabapentin 300 milliGRAM(s) Oral at bedtime  hydrALAZINE 100 milliGRAM(s) Oral two times a day  insulin lispro (HumaLOG) corrective regimen sliding scale   SubCutaneous three times a day before meals  insulin lispro (HumaLOG) corrective regimen sliding scale   SubCutaneous at bedtime  isosorbide   mononitrate ER Tablet (IMDUR) 60 milliGRAM(s) Oral daily  metoprolol succinate ER 25 milliGRAM(s) Oral daily  oxyCODONE  ER Tablet 10 milliGRAM(s) Oral every 12 hours  spironolactone 12.5 milliGRAM(s) Oral daily    MEDICATIONS  (PRN):  dextrose 40% Gel 15 Gram(s) Oral once PRN Blood Glucose LESS THAN 70 milliGRAM(s)/deciliter  glucagon  Injectable 1 milliGRAM(s) IntraMuscular once PRN Glucose LESS THAN 70 milligrams/deciliter      CAPILLARY BLOOD GLUCOSE      POCT Blood Glucose.: 96 mg/dL (2020 21:34)    I&O's Summary    2020 07:01  -  2020 07:00  --------------------------------------------------------  IN: 240 mL / OUT: 0 mL / NET: 240 mL        PHYSICAL EXAM:  Vital Signs Last 24 Hrs  T(C): 36.7 (2020 04:45), Max: 36.9 (2020 12:24)  T(F): 98 (2020 04:45), Max: 98.5 (2020 12:24)  HR: 74 (2020 04:45) (61 - 77)  BP: 148/80 (2020 04:45) (117/53 - 148/80)  BP(mean): --  RR: 17 (2020 04:45) (15 - 18)  SpO2: 99% (2020 04:45) (94% - 100%)    CONSTITUTIONAL: NAD, well-developed  RESPIRATORY: Normal respiratory effort; lungs are clear to auscultation bilaterally  CARDIOVASCULAR: Regular rate and rhythm, normal S1 and S2, no murmur/rub/gallop; No lower extremity edema; Peripheral pulses are 2+ bilaterally  ABDOMEN: Nontender to palpation, normoactive bowel sounds, no rebound/guarding; No hepatosplenomegaly  MUSCLOSKELETAL: no clubbing or cyanosis of digits; no joint swelling or tenderness to palpation  PSYCH: A+O to person, place, and time; affect appropriate    LABS:                        11.3   9.30  )-----------( 239      ( 2020 05:58 )             37.9     02-14    142  |  100  |  39<H>  ----------------------------<  145<H>  3.9   |  27  |  2.22<H>    Ca    9.4      2020 05:56  Phos  3.9     02-14  Mg     2.1     02-14    TPro  7.2  /  Alb  3.8  /  TBili  0.2  /  DBili  x   /  AST  17  /  ALT  12  /  AlkPhos  78  02-13          Urinalysis Basic - ( 2020 14:19 )    Color: Light Yellow / Appearance: Clear / S.022 / pH: x  Gluc: x / Ketone: Negative  / Bili: Negative / Urobili: Negative   Blood: x / Protein: 30 mg/dL / Nitrite: Negative   Leuk Esterase: Moderate / RBC: 2 /hpf / WBC 10 /HPF   Sq Epi: x / Non Sq Epi: 3 /hpf / Bacteria: Negative          RADIOLOGY & ADDITIONAL TESTS:  Results Reviewed:   Imaging Personally Reviewed:  Electrocardiogram Personally Reviewed:    COORDINATION OF CARE:  Care Discussed with Consultants/Other Providers [Y/N]:  Prior or Outpatient Records Reviewed [Y/N]: PROGRESS NOTE:     CONTACT INFO:   Sanford Perry (Xiao)   NS: [phone]    Patient is a 80y old  Female who presents with a chief complaint of SOB (2020 18:13)      SUBJECTIVE / OVERNIGHT EVENTS: No acute event ON. Reports having RLE pain, no change from pain at home. Denies change in swelling/redness. Denies SOB, CP, fever, chills.    ADDITIONAL REVIEW OF SYSTEMS:    MEDICATIONS  (STANDING):  apixaban 2.5 milliGRAM(s) Oral two times a day  atorvastatin 10 milliGRAM(s) Oral at bedtime  dextrose 5%. 1000 milliLiter(s) (50 mL/Hr) IV Continuous <Continuous>  dextrose 50% Injectable 12.5 Gram(s) IV Push once  dextrose 50% Injectable 25 Gram(s) IV Push once  dextrose 50% Injectable 25 Gram(s) IV Push once  furosemide   Injectable 80 milliGRAM(s) IV Push two times a day  gabapentin 300 milliGRAM(s) Oral at bedtime  hydrALAZINE 100 milliGRAM(s) Oral two times a day  insulin lispro (HumaLOG) corrective regimen sliding scale   SubCutaneous three times a day before meals  insulin lispro (HumaLOG) corrective regimen sliding scale   SubCutaneous at bedtime  isosorbide   mononitrate ER Tablet (IMDUR) 60 milliGRAM(s) Oral daily  metoprolol succinate ER 25 milliGRAM(s) Oral daily  oxyCODONE  ER Tablet 10 milliGRAM(s) Oral every 12 hours  spironolactone 12.5 milliGRAM(s) Oral daily    MEDICATIONS  (PRN):  dextrose 40% Gel 15 Gram(s) Oral once PRN Blood Glucose LESS THAN 70 milliGRAM(s)/deciliter  glucagon  Injectable 1 milliGRAM(s) IntraMuscular once PRN Glucose LESS THAN 70 milligrams/deciliter      CAPILLARY BLOOD GLUCOSE      POCT Blood Glucose.: 96 mg/dL (2020 21:34)    I&O's Summary    2020 07:01  -  2020 07:00  --------------------------------------------------------  IN: 240 mL / OUT: 0 mL / NET: 240 mL        PHYSICAL EXAM:  Vital Signs Last 24 Hrs  T(C): 36.7 (2020 04:45), Max: 36.9 (2020 12:24)  T(F): 98 (2020 04:45), Max: 98.5 (2020 12:24)  HR: 74 (2020 04:45) (61 - 77)  BP: 148/80 (2020 04:45) (117/53 - 148/80)  BP(mean): --  RR: 17 (2020 04:45) (15 - 18)  SpO2: 99% (2020 04:45) (94% - 100%)        GENERAL: NAD, well-developed  	CHEST/LUNG: non labored breathing, CTAB  	HEART: Regular rate and rhythm; No murmurs, rubs, or gallops  	ABDOMEN: Soft, Nontender, Nondistended; Bowel sounds present  	EXTREMITIES:  2+ Peripheral Pulses. 2+ b/l LE edema (R>L)  	PSYCH: AAOx3  	NEUROLOGY: non-focal  SKIN: erythema and warm to touch on RLE>LLE    LABS:                        11.3   9.30  )-----------( 239      ( 2020 05:58 )             37.9     02-14    142  |  100  |  39<H>  ----------------------------<  145<H>  3.9   |  27  |  2.22<H>    Ca    9.4      2020 05:56  Phos  3.9     02-14  Mg     2.1     02-14    TPro  7.2  /  Alb  3.8  /  TBili  0.2  /  DBili  x   /  AST  17  /  ALT  12  /  AlkPhos  78  02-13          Urinalysis Basic - ( 2020 14:19 )    Color: Light Yellow / Appearance: Clear / S.022 / pH: x  Gluc: x / Ketone: Negative  / Bili: Negative / Urobili: Negative   Blood: x / Protein: 30 mg/dL / Nitrite: Negative   Leuk Esterase: Moderate / RBC: 2 /hpf / WBC 10 /HPF   Sq Epi: x / Non Sq Epi: 3 /hpf / Bacteria: Negative          RADIOLOGY & ADDITIONAL TESTS:  Results Reviewed:   Imaging Personally Reviewed:  Electrocardiogram Personally Reviewed:    COORDINATION OF CARE:  Care Discussed with Consultants/Other Providers [Y/N]:  Prior or Outpatient Records Reviewed [Y/N]: PROGRESS NOTE:     CONTACT INFO:   Sanford Perry (Xiao)   NS: [phone]    Patient is a 80y old  Female who presents with a chief complaint of SOB (2020 18:13)      SUBJECTIVE / OVERNIGHT EVENTS: No acute event ON. Reports having RLE pain, no change from pain at home. Denies change in swelling/redness. Denies SOB, CP, fever, chills. Reports voiding okay.    ADDITIONAL REVIEW OF SYSTEMS:    MEDICATIONS  (STANDING):  apixaban 2.5 milliGRAM(s) Oral two times a day  atorvastatin 10 milliGRAM(s) Oral at bedtime  dextrose 5%. 1000 milliLiter(s) (50 mL/Hr) IV Continuous <Continuous>  dextrose 50% Injectable 12.5 Gram(s) IV Push once  dextrose 50% Injectable 25 Gram(s) IV Push once  dextrose 50% Injectable 25 Gram(s) IV Push once  furosemide   Injectable 80 milliGRAM(s) IV Push two times a day  gabapentin 300 milliGRAM(s) Oral at bedtime  hydrALAZINE 100 milliGRAM(s) Oral two times a day  insulin lispro (HumaLOG) corrective regimen sliding scale   SubCutaneous three times a day before meals  insulin lispro (HumaLOG) corrective regimen sliding scale   SubCutaneous at bedtime  isosorbide   mononitrate ER Tablet (IMDUR) 60 milliGRAM(s) Oral daily  metoprolol succinate ER 25 milliGRAM(s) Oral daily  oxyCODONE  ER Tablet 10 milliGRAM(s) Oral every 12 hours  spironolactone 12.5 milliGRAM(s) Oral daily    MEDICATIONS  (PRN):  dextrose 40% Gel 15 Gram(s) Oral once PRN Blood Glucose LESS THAN 70 milliGRAM(s)/deciliter  glucagon  Injectable 1 milliGRAM(s) IntraMuscular once PRN Glucose LESS THAN 70 milligrams/deciliter      CAPILLARY BLOOD GLUCOSE      POCT Blood Glucose.: 96 mg/dL (2020 21:34)    I&O's Summary    2020 07:01  -  2020 07:00  --------------------------------------------------------  IN: 240 mL / OUT: 0 mL / NET: 240 mL        PHYSICAL EXAM:  Vital Signs Last 24 Hrs  T(C): 36.7 (2020 04:45), Max: 36.9 (2020 12:24)  T(F): 98 (2020 04:45), Max: 98.5 (2020 12:24)  HR: 74 (2020 04:45) (61 - 77)  BP: 148/80 (2020 04:45) (117/53 - 148/80)  BP(mean): --  RR: 17 (2020 04:45) (15 - 18)  SpO2: 99% (2020 04:45) (94% - 100%)        GENERAL: NAD, well-developed  	CHEST/LUNG: non labored breathing, CTAB  	HEART: Regular rate and rhythm; No murmurs, rubs, or gallops  	ABDOMEN: Soft, Nontender, Nondistended; Bowel sounds present  	EXTREMITIES:  2+ Peripheral Pulses. 2+ b/l LE edema (R>L)  	PSYCH: AAOx3  	NEUROLOGY: non-focal  SKIN: erythema and warm to touch on RLE>LLE    LABS:                        11.3   9.30  )-----------( 239      ( 2020 05:58 )             37.9     02-14    142  |  100  |  39<H>  ----------------------------<  145<H>  3.9   |  27  |  2.22<H>    Ca    9.4      2020 05:56  Phos  3.9     02-14  Mg     2.1     02-14    TPro  7.2  /  Alb  3.8  /  TBili  0.2  /  DBili  x   /  AST  17  /  ALT  12  /  AlkPhos  78  02-13          Urinalysis Basic - ( 2020 14:19 )    Color: Light Yellow / Appearance: Clear / S.022 / pH: x  Gluc: x / Ketone: Negative  / Bili: Negative / Urobili: Negative   Blood: x / Protein: 30 mg/dL / Nitrite: Negative   Leuk Esterase: Moderate / RBC: 2 /hpf / WBC 10 /HPF   Sq Epi: x / Non Sq Epi: 3 /hpf / Bacteria: Negative          RADIOLOGY & ADDITIONAL TESTS:  Results Reviewed:   Imaging Personally Reviewed:  Electrocardiogram Personally Reviewed:    COORDINATION OF CARE:  Care Discussed with Consultants/Other Providers [Y/N]:  Prior or Outpatient Records Reviewed [Y/N]:

## 2020-02-14 NOTE — PROGRESS NOTE ADULT - PROBLEM SELECTOR PLAN 5
A1C 6.7 in 12/2019 on home glimperide and januvia  -IIS while inpatient    CKD  baseline Cr 1.5s-1.8s, now close to baseline  -monitor BMP    Chronic hip pain  on home oxycontin 10 and gralise 600qd  -given GFR, will continue oxycodone 10 and gabapentin 300qhs for now A1C 6.7 in 12/2019 on home glimperide and januvia  -ISS while inpatient    CKD  baseline Cr 1.5s-1.8s, now close to baseline  -monitor BMP    Chronic hip pain  on home oxycontin 10 and gralise 600qd  -given GFR, will continue oxycodone 10 and gabapentin 300qhs for now A1C 6.7 in 12/2019 on home glimperide and januvia  -ISS while inpatient    CKD  baseline Cr 1.5s-1.8s, now uptrending, cardiorenal vs diuretic induced  -monitor BMP  -bladder scan to r/o obstruction     Chronic hip pain  on home oxycontin 10TID and gralise 600qd  -given GFR, will continue oxycodone 10 TID and gabapentin 300 BID for now A1C 6.7 in 12/2019 on home glimperide and januvia  -ISS while inpatient    TAO on CKD  baseline Cr 1.5s-1.8s, now uptrending, cardiorenal vs diuretic induced vs obstruction  TAO  likely 2/2 CHF exacerbation vs obstruction vs diuretic induced  -given clinical s/s of CHF exacerbation, continue diuresis 80IV BID for now  -bladder scan to r/o obstruction  -trend BMP     Chronic hip pain  on home oxycontin 10TID and gralise 600qd  -oxycodone 10 TID and gabapentin 300 BID

## 2020-02-14 NOTE — PROGRESS NOTE ADULT - PROBLEM SELECTOR PLAN 2
Increased erythema on RLE for the past 3 days, more tender than LLE. Duplex from 12/2019 neg for DVT. Cellulitis vs chronic venous stasis in the setting of CHF vs lymphedema  -monitor off antibiotics  -start lasix 80 IV BID Increased erythema on RLE for the past 3 days, more tender than LLE. Duplex from 12/2019 neg for DVT. Cellulitis vs chronic venous stasis in the setting of CHF vs lymphedema. Stable  -monitor off antibiotics given leukocytosis resolved, afebrile  -c/w lasix 80 IV BID Increased erythema on RLE for the past 3 days, more tender than LLE. Duplex from 12/2019 neg for DVT. Cellulitis vs chronic venous stasis in the setting of CHF vs lymphedema. Stable  -monitor off antibiotics given leukocytosis resolved, afebrile  -c/w lasix 80 IV BID  -continue home pain meds oxycontin 10 TID and gabapentin 300 BID (home gralise 600qd) for leg pain

## 2020-02-15 DIAGNOSIS — I50.33 ACUTE ON CHRONIC DIASTOLIC (CONGESTIVE) HEART FAILURE: ICD-10-CM

## 2020-02-15 LAB
-  AMIKACIN: SIGNIFICANT CHANGE UP
-  AMPICILLIN/SULBACTAM: SIGNIFICANT CHANGE UP
-  AMPICILLIN: SIGNIFICANT CHANGE UP
-  AZTREONAM: SIGNIFICANT CHANGE UP
-  CEFAZOLIN: SIGNIFICANT CHANGE UP
-  CEFEPIME: SIGNIFICANT CHANGE UP
-  CEFOXITIN: SIGNIFICANT CHANGE UP
-  CEFTRIAXONE: SIGNIFICANT CHANGE UP
-  CIPROFLOXACIN: SIGNIFICANT CHANGE UP
-  GENTAMICIN: SIGNIFICANT CHANGE UP
-  IMIPENEM: SIGNIFICANT CHANGE UP
-  LEVOFLOXACIN: SIGNIFICANT CHANGE UP
-  MEROPENEM: SIGNIFICANT CHANGE UP
-  NITROFURANTOIN: SIGNIFICANT CHANGE UP
-  PIPERACILLIN/TAZOBACTAM: SIGNIFICANT CHANGE UP
-  TIGECYCLINE: SIGNIFICANT CHANGE UP
-  TOBRAMYCIN: SIGNIFICANT CHANGE UP
-  TRIMETHOPRIM/SULFAMETHOXAZOLE: SIGNIFICANT CHANGE UP
ANION GAP SERPL CALC-SCNC: 15 MMOL/L — SIGNIFICANT CHANGE UP (ref 5–17)
ANION GAP SERPL CALC-SCNC: 18 MMOL/L — HIGH (ref 5–17)
BUN SERPL-MCNC: 37 MG/DL — HIGH (ref 7–23)
BUN SERPL-MCNC: 41 MG/DL — HIGH (ref 7–23)
CALCIUM SERPL-MCNC: 9.1 MG/DL — SIGNIFICANT CHANGE UP (ref 8.4–10.5)
CALCIUM SERPL-MCNC: 9.4 MG/DL — SIGNIFICANT CHANGE UP (ref 8.4–10.5)
CHLORIDE SERPL-SCNC: 91 MMOL/L — LOW (ref 96–108)
CHLORIDE SERPL-SCNC: 91 MMOL/L — LOW (ref 96–108)
CO2 SERPL-SCNC: 29 MMOL/L — SIGNIFICANT CHANGE UP (ref 22–31)
CO2 SERPL-SCNC: 30 MMOL/L — SIGNIFICANT CHANGE UP (ref 22–31)
CREAT SERPL-MCNC: 2.01 MG/DL — HIGH (ref 0.5–1.3)
CREAT SERPL-MCNC: 2.3 MG/DL — HIGH (ref 0.5–1.3)
CULTURE RESULTS: SIGNIFICANT CHANGE UP
GLUCOSE BLDC GLUCOMTR-MCNC: 128 MG/DL — HIGH (ref 70–99)
GLUCOSE BLDC GLUCOMTR-MCNC: 145 MG/DL — HIGH (ref 70–99)
GLUCOSE BLDC GLUCOMTR-MCNC: 156 MG/DL — HIGH (ref 70–99)
GLUCOSE BLDC GLUCOMTR-MCNC: 158 MG/DL — HIGH (ref 70–99)
GLUCOSE SERPL-MCNC: 153 MG/DL — HIGH (ref 70–99)
GLUCOSE SERPL-MCNC: 183 MG/DL — HIGH (ref 70–99)
HCT VFR BLD CALC: 36.4 % — SIGNIFICANT CHANGE UP (ref 34.5–45)
HGB BLD-MCNC: 11.4 G/DL — LOW (ref 11.5–15.5)
MAGNESIUM SERPL-MCNC: 1.8 MG/DL — SIGNIFICANT CHANGE UP (ref 1.6–2.6)
MAGNESIUM SERPL-MCNC: 2 MG/DL — SIGNIFICANT CHANGE UP (ref 1.6–2.6)
MCHC RBC-ENTMCNC: 29.7 PG — SIGNIFICANT CHANGE UP (ref 27–34)
MCHC RBC-ENTMCNC: 31.3 GM/DL — LOW (ref 32–36)
MCV RBC AUTO: 94.8 FL — SIGNIFICANT CHANGE UP (ref 80–100)
METHOD TYPE: SIGNIFICANT CHANGE UP
NRBC # BLD: 0 /100 WBCS — SIGNIFICANT CHANGE UP (ref 0–0)
ORGANISM # SPEC MICROSCOPIC CNT: SIGNIFICANT CHANGE UP
ORGANISM # SPEC MICROSCOPIC CNT: SIGNIFICANT CHANGE UP
PHOSPHATE SERPL-MCNC: 3.5 MG/DL — SIGNIFICANT CHANGE UP (ref 2.5–4.5)
PHOSPHATE SERPL-MCNC: 3.6 MG/DL — SIGNIFICANT CHANGE UP (ref 2.5–4.5)
PLATELET # BLD AUTO: 284 K/UL — SIGNIFICANT CHANGE UP (ref 150–400)
POTASSIUM SERPL-MCNC: 3.7 MMOL/L — SIGNIFICANT CHANGE UP (ref 3.5–5.3)
POTASSIUM SERPL-MCNC: 4 MMOL/L — SIGNIFICANT CHANGE UP (ref 3.5–5.3)
POTASSIUM SERPL-SCNC: 3.7 MMOL/L — SIGNIFICANT CHANGE UP (ref 3.5–5.3)
POTASSIUM SERPL-SCNC: 4 MMOL/L — SIGNIFICANT CHANGE UP (ref 3.5–5.3)
RBC # BLD: 3.84 M/UL — SIGNIFICANT CHANGE UP (ref 3.8–5.2)
RBC # FLD: 14.9 % — HIGH (ref 10.3–14.5)
SODIUM SERPL-SCNC: 136 MMOL/L — SIGNIFICANT CHANGE UP (ref 135–145)
SODIUM SERPL-SCNC: 138 MMOL/L — SIGNIFICANT CHANGE UP (ref 135–145)
SPECIMEN SOURCE: SIGNIFICANT CHANGE UP
UUN UR-MCNC: 192 MG/DL — SIGNIFICANT CHANGE UP
WBC # BLD: 8.35 K/UL — SIGNIFICANT CHANGE UP (ref 3.8–10.5)
WBC # FLD AUTO: 8.35 K/UL — SIGNIFICANT CHANGE UP (ref 3.8–10.5)

## 2020-02-15 PROCEDURE — 99233 SBSQ HOSP IP/OBS HIGH 50: CPT | Mod: GC

## 2020-02-15 RX ORDER — POLYETHYLENE GLYCOL 3350 17 G/17G
17 POWDER, FOR SOLUTION ORAL DAILY
Refills: 0 | Status: DISCONTINUED | OUTPATIENT
Start: 2020-02-15 | End: 2020-02-17

## 2020-02-15 RX ORDER — FUROSEMIDE 40 MG
40 TABLET ORAL
Refills: 0 | Status: DISCONTINUED | OUTPATIENT
Start: 2020-02-16 | End: 2020-02-17

## 2020-02-15 RX ORDER — IPRATROPIUM/ALBUTEROL SULFATE 18-103MCG
3 AEROSOL WITH ADAPTER (GRAM) INHALATION
Refills: 0 | Status: DISCONTINUED | OUTPATIENT
Start: 2020-02-15 | End: 2020-02-24

## 2020-02-15 RX ORDER — ACETAMINOPHEN 500 MG
975 TABLET ORAL EVERY 6 HOURS
Refills: 0 | Status: DISCONTINUED | OUTPATIENT
Start: 2020-02-15 | End: 2020-02-20

## 2020-02-15 RX ORDER — SENNA PLUS 8.6 MG/1
2 TABLET ORAL AT BEDTIME
Refills: 0 | Status: DISCONTINUED | OUTPATIENT
Start: 2020-02-15 | End: 2020-02-24

## 2020-02-15 RX ORDER — SODIUM CHLORIDE 0.65 %
1 AEROSOL, SPRAY (ML) NASAL
Refills: 0 | Status: DISCONTINUED | OUTPATIENT
Start: 2020-02-15 | End: 2020-02-24

## 2020-02-15 RX ORDER — MAGNESIUM OXIDE 400 MG ORAL TABLET 241.3 MG
400 TABLET ORAL ONCE
Refills: 0 | Status: COMPLETED | OUTPATIENT
Start: 2020-02-15 | End: 2020-02-15

## 2020-02-15 RX ORDER — FUROSEMIDE 40 MG
40 TABLET ORAL
Refills: 0 | Status: DISCONTINUED | OUTPATIENT
Start: 2020-02-15 | End: 2020-02-15

## 2020-02-15 RX ADMIN — Medication 3 MILLILITER(S): at 17:16

## 2020-02-15 RX ADMIN — SPIRONOLACTONE 12.5 MILLIGRAM(S): 25 TABLET, FILM COATED ORAL at 05:07

## 2020-02-15 RX ADMIN — Medication 975 MILLIGRAM(S): at 17:32

## 2020-02-15 RX ADMIN — OXYCODONE HYDROCHLORIDE 10 MILLIGRAM(S): 5 TABLET ORAL at 16:06

## 2020-02-15 RX ADMIN — OXYCODONE HYDROCHLORIDE 10 MILLIGRAM(S): 5 TABLET ORAL at 05:07

## 2020-02-15 RX ADMIN — OXYCODONE HYDROCHLORIDE 10 MILLIGRAM(S): 5 TABLET ORAL at 14:20

## 2020-02-15 RX ADMIN — Medication 975 MILLIGRAM(S): at 17:16

## 2020-02-15 RX ADMIN — MAGNESIUM OXIDE 400 MG ORAL TABLET 400 MILLIGRAM(S): 241.3 TABLET ORAL at 14:21

## 2020-02-15 RX ADMIN — OXYCODONE HYDROCHLORIDE 10 MILLIGRAM(S): 5 TABLET ORAL at 22:31

## 2020-02-15 RX ADMIN — APIXABAN 2.5 MILLIGRAM(S): 2.5 TABLET, FILM COATED ORAL at 05:06

## 2020-02-15 RX ADMIN — APIXABAN 2.5 MILLIGRAM(S): 2.5 TABLET, FILM COATED ORAL at 17:15

## 2020-02-15 RX ADMIN — Medication 1: at 17:15

## 2020-02-15 RX ADMIN — Medication 100 MILLIGRAM(S): at 05:06

## 2020-02-15 RX ADMIN — ISOSORBIDE MONONITRATE 60 MILLIGRAM(S): 60 TABLET, EXTENDED RELEASE ORAL at 14:20

## 2020-02-15 RX ADMIN — Medication 80 MILLIGRAM(S): at 17:15

## 2020-02-15 RX ADMIN — OXYCODONE HYDROCHLORIDE 10 MILLIGRAM(S): 5 TABLET ORAL at 05:30

## 2020-02-15 RX ADMIN — Medication 25 MILLIGRAM(S): at 05:53

## 2020-02-15 RX ADMIN — Medication 80 MILLIGRAM(S): at 05:06

## 2020-02-15 RX ADMIN — SENNA PLUS 2 TABLET(S): 8.6 TABLET ORAL at 22:31

## 2020-02-15 RX ADMIN — OXYCODONE HYDROCHLORIDE 10 MILLIGRAM(S): 5 TABLET ORAL at 23:31

## 2020-02-15 RX ADMIN — Medication 1 SPRAY(S): at 17:15

## 2020-02-15 RX ADMIN — GABAPENTIN 300 MILLIGRAM(S): 400 CAPSULE ORAL at 17:15

## 2020-02-15 RX ADMIN — GABAPENTIN 300 MILLIGRAM(S): 400 CAPSULE ORAL at 05:06

## 2020-02-15 RX ADMIN — ATORVASTATIN CALCIUM 10 MILLIGRAM(S): 80 TABLET, FILM COATED ORAL at 22:31

## 2020-02-15 NOTE — PROGRESS NOTE ADULT - PROBLEM SELECTOR PLAN 2
Increased erythema on RLE for the past 3 days, more tender than LLE. Duplex from 12/2019 neg for DVT. Cellulitis vs chronic venous stasis in the setting of CHF vs lymphedema. Stable  -monitor off antibiotics given leukocytosis resolved, afebrile  -c/w lasix 80 IV BID  -continue home pain meds oxycontin 10 TID and gabapentin 300 BID (home gralise 600qd) for leg pain

## 2020-02-15 NOTE — PROGRESS NOTE ADULT - ASSESSMENT
79 y/o Greek speaking F PMHx of HFpEF (EF 60-65%, stage II diastolic dysfunction), CAD (s/p NST showing areas of infarct, no prior caths), CKD (Stage III), Breast cancer (s/p R mastectomy in 1989, c/b chronic RUE lymphedema, rectal cancer (s/p reversed ileostomy in 2015), RLE DVT (2/2019, on Eliquis), chronic R hip pain, T2DM (A1C 6.7 in 12/2019), HTN, HLD, s/p knee replacement 2017, recent admission in December for RLE swelling and redness treated for CHF exacerbation presented to the ED with SOB and chest pain, admitted for likely CHF exacerbation. 79 y/o English speaking F PMHx of HFpEF (EF 60-65%, stage II diastolic dysfunction), CAD (s/p NST showing areas of infarct, no prior caths), CKD (Stage III), Breast cancer (s/p R mastectomy in 1989, c/b chronic RUE lymphedema, rectal cancer (s/p reversed ileostomy in 2015), RLE DVT (2/2019, on Eliquis), chronic R hip pain, T2DM (A1C 6.7 in 12/2019), HTN, HLD, s/p knee replacement 2017, recent admission in December for RLE swelling and redness treated for CHF exacerbation presented to the ED with SOB and chest pain, admitted for likely CHF exacerbation now being diuresed

## 2020-02-15 NOTE — PROGRESS NOTE ADULT - PROBLEM SELECTOR PLAN 5
A1C 6.7 in 12/2019 on home glimperide and januvia  -ISS while inpatient    TAO on CKD  baseline Cr 1.5s-1.8s, now uptrending, cardiorenal vs diuretic induced vs obstruction  TAO  likely 2/2 CHF exacerbation vs obstruction vs diuretic induced  -given clinical s/s of CHF exacerbation, continue diuresis 80IV BID for now  -bladder scan to r/o obstruction  -trend BMP     Chronic hip pain  on home oxycontin 10TID and gralise 600qd  -oxycodone 10 TID and gabapentin 300 BID

## 2020-02-15 NOTE — PROGRESS NOTE ADULT - SUBJECTIVE AND OBJECTIVE BOX
PROGRESS NOTE:     CONTACT INFO:   Sanford Perry (Xiao)   NS: [phone]    Patient is a 80y old  Female who presents with a chief complaint of SOB (2020 07:56)      SUBJECTIVE / OVERNIGHT EVENTS:    ADDITIONAL REVIEW OF SYSTEMS:    MEDICATIONS  (STANDING):  apixaban 2.5 milliGRAM(s) Oral two times a day  atorvastatin 10 milliGRAM(s) Oral at bedtime  dextrose 5%. 1000 milliLiter(s) (50 mL/Hr) IV Continuous <Continuous>  dextrose 50% Injectable 12.5 Gram(s) IV Push once  dextrose 50% Injectable 25 Gram(s) IV Push once  dextrose 50% Injectable 25 Gram(s) IV Push once  furosemide   Injectable 80 milliGRAM(s) IV Push two times a day  gabapentin 300 milliGRAM(s) Oral two times a day  hydrALAZINE 100 milliGRAM(s) Oral two times a day  insulin lispro (HumaLOG) corrective regimen sliding scale   SubCutaneous three times a day before meals  insulin lispro (HumaLOG) corrective regimen sliding scale   SubCutaneous at bedtime  isosorbide   mononitrate ER Tablet (IMDUR) 60 milliGRAM(s) Oral daily  metoprolol succinate ER 25 milliGRAM(s) Oral daily  oxyCODONE  ER Tablet 10 milliGRAM(s) Oral every 8 hours  spironolactone 12.5 milliGRAM(s) Oral daily    MEDICATIONS  (PRN):  dextrose 40% Gel 15 Gram(s) Oral once PRN Blood Glucose LESS THAN 70 milliGRAM(s)/deciliter  glucagon  Injectable 1 milliGRAM(s) IntraMuscular once PRN Glucose LESS THAN 70 milligrams/deciliter      CAPILLARY BLOOD GLUCOSE      POCT Blood Glucose.: 128 mg/dL (15 Feb 2020 07:47)  POCT Blood Glucose.: 181 mg/dL (2020 21:33)  POCT Blood Glucose.: 148 mg/dL (2020 17:15)  POCT Blood Glucose.: 159 mg/dL (2020 12:09)    I&O's Summary    2020 07:01  -  15 Feb 2020 07:00  --------------------------------------------------------  IN: 360 mL / OUT: 800 mL / NET: -440 mL        PHYSICAL EXAM:  Vital Signs Last 24 Hrs  T(C): 36.7 (15 Feb 2020 04:33), Max: 37.2 (2020 13:27)  T(F): 98.1 (15 Feb 2020 04:33), Max: 98.9 (2020 13:27)  HR: 71 (15 Feb 2020 04:33) (71 - 79)  BP: 117/67 (15 Feb 2020 04:33) (115/75 - 152/73)  BP(mean): --  RR: 18 (15 Feb 2020 04:33) (18 - 18)  SpO2: 100% (15 Feb 2020 04:33) (91% - 100%)    CONSTITUTIONAL: NAD, well-developed  RESPIRATORY: Normal respiratory effort; lungs are clear to auscultation bilaterally  CARDIOVASCULAR: Regular rate and rhythm, normal S1 and S2, no murmur/rub/gallop; No lower extremity edema; Peripheral pulses are 2+ bilaterally  ABDOMEN: Nontender to palpation, normoactive bowel sounds, no rebound/guarding; No hepatosplenomegaly  MUSCLOSKELETAL: no clubbing or cyanosis of digits; no joint swelling or tenderness to palpation  PSYCH: A+O to person, place, and time; affect appropriate    LABS:                        11.3   9.30  )-----------( 239      ( 2020 05:58 )             37.9     02-14    141  |  96  |  40<H>  ----------------------------<  143<H>  3.8   |  29  |  2.07<H>    Ca    9.3      2020 17:43  Phos  3.7     02-14  Mg     2.1     02-14    TPro  7.2  /  Alb  3.8  /  TBili  0.2  /  DBili  x   /  AST  17  /  ALT  12  /  AlkPhos  78  02-13          Urinalysis Basic - ( 2020 14:19 )    Color: Light Yellow / Appearance: Clear / S.022 / pH: x  Gluc: x / Ketone: Negative  / Bili: Negative / Urobili: Negative   Blood: x / Protein: 30 mg/dL / Nitrite: Negative   Leuk Esterase: Moderate / RBC: 2 /hpf / WBC 10 /HPF   Sq Epi: x / Non Sq Epi: 3 /hpf / Bacteria: Negative        Culture - Urine (collected 2020 18:08)  Source: .Urine Clean Catch (Midstream)  Preliminary Report (2020 16:05):    50,000 - 99,000 CFU/mL Gram Negative Rods    <10,000 CFU/ml Normal Urogenital obed present        RADIOLOGY & ADDITIONAL TESTS:  Results Reviewed:   Imaging Personally Reviewed:  Electrocardiogram Personally Reviewed:    COORDINATION OF CARE:  Care Discussed with Consultants/Other Providers [Y/N]:  Prior or Outpatient Records Reviewed [Y/N]: PROGRESS NOTE:     CONTACT INFO:   Sanford Perry (Xiao)   NS: [phone]    Patient is a 80y old  Female who presents with a chief complaint of SOB (2020 07:56)      SUBJECTIVE / OVERNIGHT EVENTS: no acute event ON. Reports feeling like she couldn't breath last night because her nose and ears felt "blocked." She uses a "machine" for 30 minutes for breathing at home. Reports voiding well, no dysuria. Denies CP, abd pain, fever, chills. Leg pain controlled.    ADDITIONAL REVIEW OF SYSTEMS:    MEDICATIONS  (STANDING):  apixaban 2.5 milliGRAM(s) Oral two times a day  atorvastatin 10 milliGRAM(s) Oral at bedtime  dextrose 5%. 1000 milliLiter(s) (50 mL/Hr) IV Continuous <Continuous>  dextrose 50% Injectable 12.5 Gram(s) IV Push once  dextrose 50% Injectable 25 Gram(s) IV Push once  dextrose 50% Injectable 25 Gram(s) IV Push once  furosemide   Injectable 80 milliGRAM(s) IV Push two times a day  gabapentin 300 milliGRAM(s) Oral two times a day  hydrALAZINE 100 milliGRAM(s) Oral two times a day  insulin lispro (HumaLOG) corrective regimen sliding scale   SubCutaneous three times a day before meals  insulin lispro (HumaLOG) corrective regimen sliding scale   SubCutaneous at bedtime  isosorbide   mononitrate ER Tablet (IMDUR) 60 milliGRAM(s) Oral daily  metoprolol succinate ER 25 milliGRAM(s) Oral daily  oxyCODONE  ER Tablet 10 milliGRAM(s) Oral every 8 hours  spironolactone 12.5 milliGRAM(s) Oral daily    MEDICATIONS  (PRN):  dextrose 40% Gel 15 Gram(s) Oral once PRN Blood Glucose LESS THAN 70 milliGRAM(s)/deciliter  glucagon  Injectable 1 milliGRAM(s) IntraMuscular once PRN Glucose LESS THAN 70 milligrams/deciliter      CAPILLARY BLOOD GLUCOSE      POCT Blood Glucose.: 128 mg/dL (15 Feb 2020 07:47)  POCT Blood Glucose.: 181 mg/dL (2020 21:33)  POCT Blood Glucose.: 148 mg/dL (2020 17:15)  POCT Blood Glucose.: 159 mg/dL (2020 12:09)    I&O's Summary    2020 07:01  -  15 Feb 2020 07:00  --------------------------------------------------------  IN: 360 mL / OUT: 800 mL / NET: -440 mL        PHYSICAL EXAM:  Vital Signs Last 24 Hrs  T(C): 36.7 (15 Feb 2020 04:33), Max: 37.2 (2020 13:27)  T(F): 98.1 (15 Feb 2020 04:33), Max: 98.9 (2020 13:27)  HR: 71 (15 Feb 2020 04:33) (71 - 79)  BP: 117/67 (15 Feb 2020 04:33) (115/75 - 152/73)  BP(mean): --  RR: 18 (15 Feb 2020 04:33) (18 - 18)  SpO2: 100% (15 Feb 2020 04:33) (91% - 100%)    CONSTITUTIONAL: NAD, well-developed  RESPIRATORY: Normal respiratory effort; lungs are clear to auscultation bilaterally  CARDIOVASCULAR: Regular rate and rhythm, normal S1 and S2, no murmur/rub/gallop; No lower extremity edema  ABDOMEN: Nontender to palpation, normoactive bowel sounds, no rebound/guarding; No hepatosplenomegaly  MUSCLOSKELETAL: no clubbing or cyanosis of digits; no joint swelling or tenderness to palpation. 2+ b/l LE edema (R>L)  PSYCH: A+O to person, place, and time; affect appropriate  SKIN: erythema and warm to touch on RLE>LLE    LABS:                        11.3   9.30  )-----------( 239      ( 2020 05:58 )             37.9     02-14    141  |  96  |  40<H>  ----------------------------<  143<H>  3.8   |  29  |  2.07<H>    Ca    9.3      2020 17:43  Phos  3.7     02-14  Mg     2.1     02-14    TPro  7.2  /  Alb  3.8  /  TBili  0.2  /  DBili  x   /  AST  17  /  ALT  12  /  AlkPhos  78  02-13          Urinalysis Basic - ( 2020 14:19 )    Color: Light Yellow / Appearance: Clear / S.022 / pH: x  Gluc: x / Ketone: Negative  / Bili: Negative / Urobili: Negative   Blood: x / Protein: 30 mg/dL / Nitrite: Negative   Leuk Esterase: Moderate / RBC: 2 /hpf / WBC 10 /HPF   Sq Epi: x / Non Sq Epi: 3 /hpf / Bacteria: Negative        Culture - Urine (collected 2020 18:08)  Source: .Urine Clean Catch (Midstream)  Preliminary Report (2020 16:05):    50,000 - 99,000 CFU/mL Gram Negative Rods    <10,000 CFU/ml Normal Urogenital obed present        RADIOLOGY & ADDITIONAL TESTS:  Results Reviewed:   Imaging Personally Reviewed:  Electrocardiogram Personally Reviewed:    COORDINATION OF CARE:  Care Discussed with Consultants/Other Providers [Y/N]:  Prior or Outpatient Records Reviewed [Y/N]: PROGRESS NOTE:     CONTACT INFO:   Sanford Perry (Xiao)   NS: [phone]    Patient is a 80y old  Female who presents with a chief complaint of SOB (2020 07:56)      SUBJECTIVE / OVERNIGHT EVENTS: no acute event ON. Reports feeling like she couldn't breath last night because her nose and ears felt "blocked." She uses a "machine" for 30 minutes for breathing at home. Reports voiding well, no dysuria. Denies CP, abd pain, fever, chills. Leg pain controlled.    ADDITIONAL REVIEW OF SYSTEMS:    MEDICATIONS  (STANDING):  apixaban 2.5 milliGRAM(s) Oral two times a day  atorvastatin 10 milliGRAM(s) Oral at bedtime  dextrose 5%. 1000 milliLiter(s) (50 mL/Hr) IV Continuous <Continuous>  dextrose 50% Injectable 12.5 Gram(s) IV Push once  dextrose 50% Injectable 25 Gram(s) IV Push once  dextrose 50% Injectable 25 Gram(s) IV Push once  furosemide   Injectable 80 milliGRAM(s) IV Push two times a day  gabapentin 300 milliGRAM(s) Oral two times a day  hydrALAZINE 100 milliGRAM(s) Oral two times a day  insulin lispro (HumaLOG) corrective regimen sliding scale   SubCutaneous three times a day before meals  insulin lispro (HumaLOG) corrective regimen sliding scale   SubCutaneous at bedtime  isosorbide   mononitrate ER Tablet (IMDUR) 60 milliGRAM(s) Oral daily  metoprolol succinate ER 25 milliGRAM(s) Oral daily  oxyCODONE  ER Tablet 10 milliGRAM(s) Oral every 8 hours  spironolactone 12.5 milliGRAM(s) Oral daily    MEDICATIONS  (PRN):  dextrose 40% Gel 15 Gram(s) Oral once PRN Blood Glucose LESS THAN 70 milliGRAM(s)/deciliter  glucagon  Injectable 1 milliGRAM(s) IntraMuscular once PRN Glucose LESS THAN 70 milligrams/deciliter      CAPILLARY BLOOD GLUCOSE      POCT Blood Glucose.: 128 mg/dL (15 Feb 2020 07:47)  POCT Blood Glucose.: 181 mg/dL (2020 21:33)  POCT Blood Glucose.: 148 mg/dL (2020 17:15)  POCT Blood Glucose.: 159 mg/dL (2020 12:09)    I&O's Summary    2020 07:01  -  15 Feb 2020 07:00  --------------------------------------------------------  IN: 360 mL / OUT: 800 mL / NET: -440 mL        PHYSICAL EXAM:  Vital Signs Last 24 Hrs  T(C): 36.7 (15 Feb 2020 04:33), Max: 37.2 (2020 13:27)  T(F): 98.1 (15 Feb 2020 04:33), Max: 98.9 (2020 13:27)  HR: 71 (15 Feb 2020 04:33) (71 - 79)  BP: 117/67 (15 Feb 2020 04:33) (115/75 - 152/73)  BP(mean): --  RR: 18 (15 Feb 2020 04:33) (18 - 18)  SpO2: 100% (15 Feb 2020 04:33) (91% - 100%)    CONSTITUTIONAL: NAD, well-developed  RESPIRATORY: Normal respiratory effort; lungs are clear to auscultation bilaterally  CARDIOVASCULAR: Regular rate and rhythm, normal S1 and S2, no murmur/rub/gallop; No lower extremity edema  ABDOMEN: Nontender to palpation, normoactive bowel sounds, no rebound/guarding; No hepatosplenomegaly  MUSCLOSKELETAL: no clubbing or cyanosis of digits; no joint swelling or tenderness to palpation. 2+ b/l LE edema (R>L)  PSYCH: A+O to person, place, and time; affect appropriate  SKIN: improved erythema and warm to touch on RLE>LLE    LABS:                        11.3   9.30  )-----------( 239      ( 2020 05:58 )             37.9     02-14    141  |  96  |  40<H>  ----------------------------<  143<H>  3.8   |  29  |  2.07<H>    Ca    9.3      2020 17:43  Phos  3.7     02-14  Mg     2.1     02-14    TPro  7.2  /  Alb  3.8  /  TBili  0.2  /  DBili  x   /  AST  17  /  ALT  12  /  AlkPhos  78  02-13          Urinalysis Basic - ( 2020 14:19 )    Color: Light Yellow / Appearance: Clear / S.022 / pH: x  Gluc: x / Ketone: Negative  / Bili: Negative / Urobili: Negative   Blood: x / Protein: 30 mg/dL / Nitrite: Negative   Leuk Esterase: Moderate / RBC: 2 /hpf / WBC 10 /HPF   Sq Epi: x / Non Sq Epi: 3 /hpf / Bacteria: Negative        Culture - Urine (collected 2020 18:08)  Source: .Urine Clean Catch (Midstream)  Preliminary Report (2020 16:05):    50,000 - 99,000 CFU/mL Gram Negative Rods    <10,000 CFU/ml Normal Urogenital boed present        RADIOLOGY & ADDITIONAL TESTS:  Results Reviewed:   Imaging Personally Reviewed:  Electrocardiogram Personally Reviewed:    COORDINATION OF CARE:  Care Discussed with Consultants/Other Providers [Y/N]:  Prior or Outpatient Records Reviewed [Y/N]:

## 2020-02-15 NOTE — PROGRESS NOTE ADULT - PROBLEM SELECTOR PLAN 1
hypoxic to 89% at outpt clinic, SOB today, b/l basilar crackles, significant peripheral edema on home bumex 1mg BID. ProBNP 1072. Trop neg. Now satting well on NC2L  -s/p 1mg Bumex in the ED, CXR clear, RVP neg  -c/w lasix IV 80 BID for now  -check pro bnp upon dc  -strict I&Os  -monitor on tele: sinu 60-70s  -continue home lopressor 25, spironolactone 12.5 qd  -check PM BMP for electrolyte abnormality hypoxic to 89% at outpt clinic, SOB today, b/l basilar crackles, significant peripheral edema on home bumex 1mg BID. ProBNP 1072. Trop neg. Now satting well on NC2L  -s/p 1mg Bumex in the ED, CXR clear, RVP neg  -c/w lasix IV 80 BID for now  -check pro bnp upon dc  -strict I&Os  -monitor on tele  -continue home lopressor 25, spironolactone 12.5 qd  -check PM BMP for electrolyte abnormality  -Dr. Hagan contacted via email  -symptomatic support with saline spray and duoneb

## 2020-02-15 NOTE — PROGRESS NOTE ADULT - PROBLEM SELECTOR PLAN 10
-DVT: on eliquis  -Diet: consistent carb/DASH  -Dispo: PT eval--home, no skilled PT needed    Discharge planning  Transitions of Care Status:  1.  Name of PCP: Dr. Dwakins  2.  PCP Contacted on Admission: [X] Y    [ ] N    3.  PCP contacted at Discharge: [ ] Y    [ ] N    [ ] N/A  4.  Post-Discharge Appointment Date and Location:  5.  Summary of Handoff given to PCP:

## 2020-02-16 ENCOUNTER — TRANSCRIPTION ENCOUNTER (OUTPATIENT)
Age: 80
End: 2020-02-16

## 2020-02-16 LAB
ANION GAP SERPL CALC-SCNC: 11 MMOL/L — SIGNIFICANT CHANGE UP (ref 5–17)
BUN SERPL-MCNC: 41 MG/DL — HIGH (ref 7–23)
CALCIUM SERPL-MCNC: 9.4 MG/DL — SIGNIFICANT CHANGE UP (ref 8.4–10.5)
CHLORIDE SERPL-SCNC: 90 MMOL/L — LOW (ref 96–108)
CO2 SERPL-SCNC: 34 MMOL/L — HIGH (ref 22–31)
CREAT SERPL-MCNC: 2.39 MG/DL — HIGH (ref 0.5–1.3)
GLUCOSE BLDC GLUCOMTR-MCNC: 130 MG/DL — HIGH (ref 70–99)
GLUCOSE BLDC GLUCOMTR-MCNC: 149 MG/DL — HIGH (ref 70–99)
GLUCOSE BLDC GLUCOMTR-MCNC: 163 MG/DL — HIGH (ref 70–99)
GLUCOSE BLDC GLUCOMTR-MCNC: 172 MG/DL — HIGH (ref 70–99)
GLUCOSE SERPL-MCNC: 146 MG/DL — HIGH (ref 70–99)
HCT VFR BLD CALC: 35.2 % — SIGNIFICANT CHANGE UP (ref 34.5–45)
HGB BLD-MCNC: 10.5 G/DL — LOW (ref 11.5–15.5)
MAGNESIUM SERPL-MCNC: 2.1 MG/DL — SIGNIFICANT CHANGE UP (ref 1.6–2.6)
MCHC RBC-ENTMCNC: 29.1 PG — SIGNIFICANT CHANGE UP (ref 27–34)
MCHC RBC-ENTMCNC: 29.8 GM/DL — LOW (ref 32–36)
MCV RBC AUTO: 97.5 FL — SIGNIFICANT CHANGE UP (ref 80–100)
NRBC # BLD: 0 /100 WBCS — SIGNIFICANT CHANGE UP (ref 0–0)
PHOSPHATE SERPL-MCNC: 4.3 MG/DL — SIGNIFICANT CHANGE UP (ref 2.5–4.5)
PLATELET # BLD AUTO: 247 K/UL — SIGNIFICANT CHANGE UP (ref 150–400)
POTASSIUM SERPL-MCNC: 3.8 MMOL/L — SIGNIFICANT CHANGE UP (ref 3.5–5.3)
POTASSIUM SERPL-SCNC: 3.8 MMOL/L — SIGNIFICANT CHANGE UP (ref 3.5–5.3)
RBC # BLD: 3.61 M/UL — LOW (ref 3.8–5.2)
RBC # FLD: 14.8 % — HIGH (ref 10.3–14.5)
SODIUM SERPL-SCNC: 135 MMOL/L — SIGNIFICANT CHANGE UP (ref 135–145)
WBC # BLD: 8.92 K/UL — SIGNIFICANT CHANGE UP (ref 3.8–10.5)
WBC # FLD AUTO: 8.92 K/UL — SIGNIFICANT CHANGE UP (ref 3.8–10.5)

## 2020-02-16 PROCEDURE — 99232 SBSQ HOSP IP/OBS MODERATE 35: CPT | Mod: GC

## 2020-02-16 RX ORDER — FLUTICASONE PROPIONATE 50 MCG
1 SPRAY, SUSPENSION NASAL
Refills: 0 | Status: DISCONTINUED | OUTPATIENT
Start: 2020-02-16 | End: 2020-02-24

## 2020-02-16 RX ADMIN — OXYCODONE HYDROCHLORIDE 10 MILLIGRAM(S): 5 TABLET ORAL at 05:57

## 2020-02-16 RX ADMIN — SENNA PLUS 2 TABLET(S): 8.6 TABLET ORAL at 21:09

## 2020-02-16 RX ADMIN — OXYCODONE HYDROCHLORIDE 10 MILLIGRAM(S): 5 TABLET ORAL at 21:39

## 2020-02-16 RX ADMIN — POLYETHYLENE GLYCOL 3350 17 GRAM(S): 17 POWDER, FOR SOLUTION ORAL at 12:29

## 2020-02-16 RX ADMIN — GABAPENTIN 300 MILLIGRAM(S): 400 CAPSULE ORAL at 05:53

## 2020-02-16 RX ADMIN — Medication 40 MILLIGRAM(S): at 17:27

## 2020-02-16 RX ADMIN — Medication 3 MILLILITER(S): at 05:55

## 2020-02-16 RX ADMIN — OXYCODONE HYDROCHLORIDE 10 MILLIGRAM(S): 5 TABLET ORAL at 15:09

## 2020-02-16 RX ADMIN — ISOSORBIDE MONONITRATE 60 MILLIGRAM(S): 60 TABLET, EXTENDED RELEASE ORAL at 12:29

## 2020-02-16 RX ADMIN — ATORVASTATIN CALCIUM 10 MILLIGRAM(S): 80 TABLET, FILM COATED ORAL at 21:09

## 2020-02-16 RX ADMIN — Medication 3 MILLILITER(S): at 17:26

## 2020-02-16 RX ADMIN — OXYCODONE HYDROCHLORIDE 10 MILLIGRAM(S): 5 TABLET ORAL at 21:09

## 2020-02-16 RX ADMIN — Medication 1: at 17:25

## 2020-02-16 RX ADMIN — APIXABAN 2.5 MILLIGRAM(S): 2.5 TABLET, FILM COATED ORAL at 05:53

## 2020-02-16 RX ADMIN — OXYCODONE HYDROCHLORIDE 10 MILLIGRAM(S): 5 TABLET ORAL at 15:39

## 2020-02-16 RX ADMIN — Medication 1 SPRAY(S): at 17:27

## 2020-02-16 RX ADMIN — Medication 100 MILLIGRAM(S): at 05:53

## 2020-02-16 RX ADMIN — SPIRONOLACTONE 12.5 MILLIGRAM(S): 25 TABLET, FILM COATED ORAL at 05:53

## 2020-02-16 RX ADMIN — Medication 25 MILLIGRAM(S): at 05:53

## 2020-02-16 RX ADMIN — GABAPENTIN 300 MILLIGRAM(S): 400 CAPSULE ORAL at 17:27

## 2020-02-16 RX ADMIN — APIXABAN 2.5 MILLIGRAM(S): 2.5 TABLET, FILM COATED ORAL at 17:27

## 2020-02-16 RX ADMIN — Medication 40 MILLIGRAM(S): at 05:54

## 2020-02-16 RX ADMIN — Medication 1 SPRAY(S): at 05:54

## 2020-02-16 RX ADMIN — Medication 100 MILLIGRAM(S): at 17:27

## 2020-02-16 NOTE — PROGRESS NOTE ADULT - SUBJECTIVE AND OBJECTIVE BOX
PROGRESS NOTE:   Authoted by Dr. Emily Alberto MD, Pager [phone] [facility], [phone] [facility]     Patient is a 80y old  Female who presents with a chief complaint of SOB (15 Feb 2020 09:52)    SUBJECTIVE / OVERNIGHT EVENTS:    ADDITIONAL REVIEW OF SYSTEMS:    MEDICATIONS  (STANDING):  albuterol/ipratropium for Nebulization. 3 milliLiter(s) Nebulizer two times a day  apixaban 2.5 milliGRAM(s) Oral two times a day  atorvastatin 10 milliGRAM(s) Oral at bedtime  dextrose 5%. 1000 milliLiter(s) (50 mL/Hr) IV Continuous <Continuous>  dextrose 50% Injectable 12.5 Gram(s) IV Push once  dextrose 50% Injectable 25 Gram(s) IV Push once  dextrose 50% Injectable 25 Gram(s) IV Push once  furosemide   Injectable 40 milliGRAM(s) IV Push two times a day  gabapentin 300 milliGRAM(s) Oral two times a day  hydrALAZINE 100 milliGRAM(s) Oral two times a day  insulin lispro (HumaLOG) corrective regimen sliding scale   SubCutaneous three times a day before meals  insulin lispro (HumaLOG) corrective regimen sliding scale   SubCutaneous at bedtime  isosorbide   mononitrate ER Tablet (IMDUR) 60 milliGRAM(s) Oral daily  metoprolol succinate ER 25 milliGRAM(s) Oral daily  oxyCODONE  ER Tablet 10 milliGRAM(s) Oral every 8 hours  polyethylene glycol 3350 17 Gram(s) Oral daily  senna 2 Tablet(s) Oral at bedtime  sodium chloride 0.65% Nasal 1 Spray(s) Both Nostrils two times a day  spironolactone 12.5 milliGRAM(s) Oral daily    MEDICATIONS  (PRN):  acetaminophen   Tablet .. 975 milliGRAM(s) Oral every 6 hours PRN Moderate Pain (4 - 6)  dextrose 40% Gel 15 Gram(s) Oral once PRN Blood Glucose LESS THAN 70 milliGRAM(s)/deciliter  glucagon  Injectable 1 milliGRAM(s) IntraMuscular once PRN Glucose LESS THAN 70 milligrams/deciliter      CAPILLARY BLOOD GLUCOSE      POCT Blood Glucose.: 149 mg/dL (16 Feb 2020 07:12)  POCT Blood Glucose.: 156 mg/dL (15 Feb 2020 22:18)  POCT Blood Glucose.: 158 mg/dL (15 Feb 2020 17:01)  POCT Blood Glucose.: 145 mg/dL (15 Feb 2020 12:13)  POCT Blood Glucose.: 128 mg/dL (15 Feb 2020 07:47)    I&O's Summary    15 Feb 2020 07:01  -  16 Feb 2020 07:00  --------------------------------------------------------  IN: 420 mL / OUT: 2150 mL / NET: -1730 mL        PHYSICAL EXAM:  Vital Signs Last 24 Hrs  T(C): 36.8 (16 Feb 2020 04:34), Max: 38.3 (15 Feb 2020 14:01)  T(F): 98.3 (16 Feb 2020 04:34), Max: 100.9 (15 Feb 2020 14:01)  HR: 69 (16 Feb 2020 04:34) (69 - 83)  BP: 148/68 (16 Feb 2020 04:34) (96/45 - 148/68)  BP(mean): --  RR: 18 (16 Feb 2020 04:34) (18 - 18)  SpO2: 99% (16 Feb 2020 04:34) (95% - 99%)    CONSTITUTIONAL: NAD, well-developed  RESPIRATORY: Normal respiratory effort; lungs are clear to auscultation bilaterally  CARDIOVASCULAR: Regular rate and rhythm, normal S1 and S2, no murmur/rub/gallop; No lower extremity edema; Peripheral pulses are 2+ bilaterally  ABDOMEN: Nontender to palpation, normoactive bowel sounds, no rebound/guarding; No hepatosplenomegaly  MUSCLOSKELETAL: no clubbing or cyanosis of digits; no joint swelling or tenderness to palpation  PSYCH: A+O to person, place, and time; affect appropriate    LABS:                        10.5   8.92  )-----------( 247      ( 16 Feb 2020 06:33 )             35.2     02-16    135  |  90<L>  |  41<H>  ----------------------------<  146<H>  3.8   |  34<H>  |  2.39<H>    Ca    9.4      16 Feb 2020 06:33  Phos  4.3     02-16  Mg     2.1     02-16                Culture - Urine (collected 13 Feb 2020 18:08)  Source: .Urine Clean Catch (Midstream)  Final Report (15 Feb 2020 23:09):    50,000 - 99,000 CFU/mL Escherichia coli    <10,000 CFU/ml Normal Urogenital obed present  Organism: Escherichia coli (15 Feb 2020 23:09)  Organism: Escherichia coli (15 Feb 2020 23:09)        RADIOLOGY & ADDITIONAL TESTS:  Results Reviewed:   Imaging Personally Reviewed:  Electrocardiogram Personally Reviewed:    COORDINATION OF CARE:  Care Discussed with Consultants/Other Providers [Y/N]:  Prior or Outpatient Records Reviewed [Y/N]: PROGRESS NOTE:   Authoted by Dr. Emily Alberto MD, Pager [phone] [facility], [phone] [facility]     Patient is a 80y old  Female who presents with a chief complaint of SOB (15 Feb 2020 09:52)    SUBJECTIVE / OVERNIGHT EVENTS: Used [facility]  ID [id number] to speak to patient this morning. No acute events overnight. Patient says she has some nasal congestion. Her breathing is okay however she becomes short of breath if she tries to lie flat. No chest pain or palpitations. Sinus rhythm on tele with intermittent PVCx.     ADDITIONAL REVIEW OF SYSTEMS:    MEDICATIONS  (STANDING):  albuterol/ipratropium for Nebulization. 3 milliLiter(s) Nebulizer two times a day  apixaban 2.5 milliGRAM(s) Oral two times a day  atorvastatin 10 milliGRAM(s) Oral at bedtime  dextrose 5%. 1000 milliLiter(s) (50 mL/Hr) IV Continuous <Continuous>  dextrose 50% Injectable 12.5 Gram(s) IV Push once  dextrose 50% Injectable 25 Gram(s) IV Push once  dextrose 50% Injectable 25 Gram(s) IV Push once  furosemide   Injectable 40 milliGRAM(s) IV Push two times a day  gabapentin 300 milliGRAM(s) Oral two times a day  hydrALAZINE 100 milliGRAM(s) Oral two times a day  insulin lispro (HumaLOG) corrective regimen sliding scale   SubCutaneous three times a day before meals  insulin lispro (HumaLOG) corrective regimen sliding scale   SubCutaneous at bedtime  isosorbide   mononitrate ER Tablet (IMDUR) 60 milliGRAM(s) Oral daily  metoprolol succinate ER 25 milliGRAM(s) Oral daily  oxyCODONE  ER Tablet 10 milliGRAM(s) Oral every 8 hours  polyethylene glycol 3350 17 Gram(s) Oral daily  senna 2 Tablet(s) Oral at bedtime  sodium chloride 0.65% Nasal 1 Spray(s) Both Nostrils two times a day  spironolactone 12.5 milliGRAM(s) Oral daily    MEDICATIONS  (PRN):  acetaminophen   Tablet .. 975 milliGRAM(s) Oral every 6 hours PRN Moderate Pain (4 - 6)  dextrose 40% Gel 15 Gram(s) Oral once PRN Blood Glucose LESS THAN 70 milliGRAM(s)/deciliter  glucagon  Injectable 1 milliGRAM(s) IntraMuscular once PRN Glucose LESS THAN 70 milligrams/deciliter      CAPILLARY BLOOD GLUCOSE      POCT Blood Glucose.: 149 mg/dL (16 Feb 2020 07:12)  POCT Blood Glucose.: 156 mg/dL (15 Feb 2020 22:18)  POCT Blood Glucose.: 158 mg/dL (15 Feb 2020 17:01)  POCT Blood Glucose.: 145 mg/dL (15 Feb 2020 12:13)  POCT Blood Glucose.: 128 mg/dL (15 Feb 2020 07:47)    I&O's Summary    15 Feb 2020 07:01  -  16 Feb 2020 07:00  --------------------------------------------------------  IN: 420 mL / OUT: 2150 mL / NET: -1730 mL        PHYSICAL EXAM:  Vital Signs Last 24 Hrs  T(C): 36.8 (16 Feb 2020 04:34), Max: 38.3 (15 Feb 2020 14:01)  T(F): 98.3 (16 Feb 2020 04:34), Max: 100.9 (15 Feb 2020 14:01)  HR: 69 (16 Feb 2020 04:34) (69 - 83)  BP: 148/68 (16 Feb 2020 04:34) (96/45 - 148/68)  BP(mean): --  RR: 18 (16 Feb 2020 04:34) (18 - 18)  SpO2: 99% (16 Feb 2020 04:34) (95% - 99%)    PHYSICAL EXAM:  General: No acute distress on nasal cannula   HEENT: NCAT.  PERRL.  EOMI.  No scleral icterus or injection.    Neck: Supple.  Full ROM.  No JVD.    Heart: RRR.  Normal S1 and S2.  No murmurs, rubs, or gallops.   Lungs: +crackles at the bases bilaterally   Abdomen: BS+, soft, NT/ND.   Skin: redness of b/l lower extremities improving from prior   Extremities: 2+ pitting edema in b/l lower ext   Musculoskeletal: No deformities.    Neuro: No neuro focal deficits     LABS:                        10.5   8.92  )-----------( 247      ( 16 Feb 2020 06:33 )             35.2     02-16    135  |  90<L>  |  41<H>  ----------------------------<  146<H>  3.8   |  34<H>  |  2.39<H>    Ca    9.4      16 Feb 2020 06:33  Phos  4.3     02-16  Mg     2.1     02-16                Culture - Urine (collected 13 Feb 2020 18:08)  Source: .Urine Clean Catch (Midstream)  Final Report (15 Feb 2020 23:09):    50,000 - 99,000 CFU/mL Escherichia coli    <10,000 CFU/ml Normal Urogenital obed present  Organism: Escherichia coli (15 Feb 2020 23:09)  Organism: Escherichia coli (15 Feb 2020 23:09)        RADIOLOGY & ADDITIONAL TESTS:  Results Reviewed:   Imaging Personally Reviewed:  Electrocardiogram Personally Reviewed:    COORDINATION OF CARE:  Care Discussed with Consultants/Other Providers [Y/N]:  Prior or Outpatient Records Reviewed [Y/N]:

## 2020-02-16 NOTE — DISCHARGE NOTE PROVIDER - NSDCMRMEDTOKEN_GEN_ALL_CORE_FT
atorvastatin 10 mg oral tablet: 1 tab(s) orally once a day  bumetanide 1 mg oral tablet: orally 2 times a day  Eliquis 2.5 mg oral tablet: 1 tab(s) orally 2 times a day  glimepiride 4 mg oral tablet: 1 tab(s) orally once a day  Gralise 600 mg/24 hours oral tablet, extended release: 1 tab(s) orally once a day  hydrALAZINE 100 mg oral tablet: 1 tab(s) orally 2 times a day  isosorbide mononitrate 60 mg oral tablet, extended release: 1 tab(s) orally once a day (in the morning)  Januvia 25 mg oral tablet: 1 tab(s) orally once a day  metoprolol succinate 25 mg oral tablet, extended release: 1 tab(s) orally once a day  oxyCODONE 10 mg oral tablet, extended release: 1 tab(s) orally every 8 hours  spironolactone: 12.5 milligram(s) orally once a day atorvastatin 10 mg oral tablet: 1 tab(s) orally once a day  bumetanide 1 mg oral tablet: orally 2 times a day  Eliquis 2.5 mg oral tablet: 1 tab(s) orally 2 times a day  glimepiride 4 mg oral tablet: 1 tab(s) orally once a day  Gralise 600 mg/24 hours oral tablet, extended release: 1 tab(s) orally once a day  hydrALAZINE 100 mg oral tablet: 1 tab(s) orally 2 times a day  isosorbide mononitrate 60 mg oral tablet, extended release: 1 tab(s) orally once a day (in the morning)  Januvia 25 mg oral tablet: 1 tab(s) orally once a day  metoprolol succinate 25 mg oral tablet, extended release: 1 tab(s) orally once a day  oxyCODONE 10 mg oral tablet, extended release: 1 tab(s) orally every 8 hours  polyethylene glycol 3350 oral powder for reconstitution: 17 gram(s) orally 2 times a day  spironolactone: 12.5 milligram(s) orally once a day atorvastatin 10 mg oral tablet: 1 tab(s) orally once a day  bumetanide 1 mg oral tablet: orally 2 times a day  Eliquis 2.5 mg oral tablet: 1 tab(s) orally 2 times a day  glimepiride 4 mg oral tablet: 1 tab(s) orally once a day  Gralise 600 mg/24 hours oral tablet, extended release: 1 tab(s) orally once a day  hydrALAZINE 100 mg oral tablet: 1 tab(s) orally 2 times a day  isosorbide mononitrate 60 mg oral tablet, extended release: 1 tab(s) orally once a day (in the morning)  Januvia 25 mg oral tablet: 1 tab(s) orally once a day  metoprolol succinate 25 mg oral tablet, extended release: 1 tab(s) orally once a day  oxyCODONE 10 mg oral tablet, extended release: 1 tab(s) orally every 8 hours, As Needed  polyethylene glycol 3350 oral powder for reconstitution: 17 gram(s) orally 2 times a day  spironolactone: 12.5 milligram(s) orally once a day atorvastatin 10 mg oral tablet: 1 tab(s) orally once a day  bumetanide 1 mg oral tablet: orally 2 times a day  Eliquis 2.5 mg oral tablet: 1 tab(s) orally 2 times a day  glimepiride 4 mg oral tablet: 1 tab(s) orally once a day  Gralise 600 mg/24 hours oral tablet, extended release: 1 tab(s) orally once a day  hydrALAZINE 100 mg oral tablet: 1 tab(s) orally 2 times a day  isosorbide mononitrate 60 mg oral tablet, extended release: 1 tab(s) orally once a day (in the morning)  Januvia 25 mg oral tablet: 1 tab(s) orally once a day  metoprolol succinate 25 mg oral tablet, extended release: 1 tab(s) orally once a day  oxyCODONE 10 mg oral tablet, extended release: 1 tab(s) orally every 8 hours, As Needed  polyethylene glycol 3350 oral powder for reconstitution: 17 gram(s) orally 2 times a day  predniSONE 20 mg oral tablet: 1 tab(s) orally once a day   spironolactone: 12.5 milligram(s) orally once a day

## 2020-02-16 NOTE — DISCHARGE NOTE PROVIDER - CARE PROVIDER_API CALL
Ld Hagan)  Cardiovascular Disease; Nuclear Cardiology  [address] [place] [address], [address]  [place] [address]  Phone: [phone]  Fax: [phone]  Follow Up Time: Ld Hagan)  Cardiovascular Disease; Nuclear Cardiology  [address] [place] [address], [address]  [place] [address]  Phone: [phone]  Fax: [phone]  Follow Up Time: 1 week    Luciano Dawkins)  Internal Medicine  [address] [place] [address]  Phone: [phone]  Fax: [phone]  Follow Up Time: 1 week

## 2020-02-16 NOTE — PROGRESS NOTE ADULT - PROBLEM SELECTOR PLAN 1
hypoxic to 89% at outpt clinic, SOB today, b/l basilar crackles, significant peripheral edema on home bumex 1mg BID. ProBNP 1072. Trop neg. Now satting well on NC2L. CXR clear, RVP neg  -decreased to 40 IV lasix BID   -check pro bnp upon dc  -strict I&Os  -monitor on tele  -continue home lopressor 25, spironolactone 12.5 qd  -Dr. Hagan contacted via email  -symptomatic support with saline spray and duoneb

## 2020-02-16 NOTE — DISCHARGE NOTE PROVIDER - CARE PROVIDERS DIRECT ADDRESSES
[email or web address]

## 2020-02-16 NOTE — PROGRESS NOTE ADULT - PROBLEM SELECTOR PLAN 10
-DVT: on eliquis  -Diet: consistent carb/DASH  -Dispo: PT eval--home, no skilled PT needed    Discharge planning  Transitions of Care Status:  1.  Name of PCP: Dr. Dawkins  2.  PCP Contacted on Admission: [X] Y    [ ] N    3.  PCP contacted at Discharge: [ ] Y    [ ] N    [ ] N/A  4.  Post-Discharge Appointment Date and Location:  5.  Summary of Handoff given to PCP:

## 2020-02-16 NOTE — DISCHARGE NOTE PROVIDER - NSDCFUADDAPPT_GEN_ALL_CORE_FT
Please follow-up with your primary care physician Dr. Dawkins within 1 week. Please also get labwork to check your kidney function at that time.    Please follow-up with your cardiologist Dr. Hagan within 1 week.

## 2020-02-16 NOTE — PROGRESS NOTE ADULT - PROBLEM SELECTOR PLAN 3
UA noted for WBC and Leuk esterase, no dysuria  -monitor off antibiotics for now  -Ucx with Ecoli however patient asymptomatic

## 2020-02-16 NOTE — DISCHARGE NOTE PROVIDER - NSDCFUSCHEDAPPT_GEN_ALL_CORE_FT
NURIA GARCIA ; 03/19/2020 ; [facility] [address]  NURIA GARCIA ; 03/19/2020 ; [facility] [address] NURIA GARCIA ; 03/04/2020 ; [facility] [address] ; 03/19/2020 ; [facility] [address]

## 2020-02-16 NOTE — DISCHARGE NOTE PROVIDER - NSDCCPCAREPLAN_GEN_ALL_CORE_FT
PRINCIPAL DISCHARGE DIAGNOSIS  Diagnosis: Acute on chronic congestive heart failure, unspecified heart failure type  Assessment and Plan of Treatment: You came to the hospital becuase you felt short of breat, likely from acute on chronic heart failure. You were treated with lasix during your hospital stay. Please follow up with your cardiologist after your are discharged.      SECONDARY DISCHARGE DIAGNOSES  Diagnosis: Chest pain, unspecified type  Assessment and Plan of Treatment: You had chest pain prior to coming to the hospital which had resolved since. Cardiac enzymes normal on admission. Please follow up with your cardiologist after discharged. PRINCIPAL DISCHARGE DIAGNOSIS  Diagnosis: Acute on chronic congestive heart failure, unspecified heart failure type  Assessment and Plan of Treatment: You came to the hospital becuase you felt short of breat, likely from acute on chronic heart failure. You were treated with lasix during your hospital stay and your breathing improved. Please continue to take your medications as prescribed. Please follow up with your cardiologist after your are discharged.      SECONDARY DISCHARGE DIAGNOSES  Diagnosis: Chest pain, unspecified type  Assessment and Plan of Treatment: You had chest pain prior to coming to the hospital which had resolved since. Cardiac enzymes normal on admission. Please follow up with your cardiologist after discharged.    Diagnosis: TAO (acute kidney injury)  Assessment and Plan of Treatment: Your kidney function decreased due to dehydration from your increased diuretic medications while you were in the hospital. As your breathing improved, we decreased your diuretic medication dose again and your kidney function is now improving. Please follow-up with your primary care doctor within 1 week for repeat labwork to check your kidney function. PRINCIPAL DISCHARGE DIAGNOSIS  Diagnosis: Acute on chronic congestive heart failure, unspecified heart failure type  Assessment and Plan of Treatment: You came to the hospital becuase you felt short of breat, likely from acute on chronic heart failure. You were treated with lasix during your hospital stay and your breathing improved. Please continue to take your medications as prescribed. Please follow up with your cardiologist after your are discharged.      SECONDARY DISCHARGE DIAGNOSES  Diagnosis: Chest pain, unspecified type  Assessment and Plan of Treatment: You had chest pain prior to coming to the hospital which had resolved since. Cardiac enzymes normal on admission. Please follow up with your cardiologist after discharged.    Diagnosis: TAO (acute kidney injury)  Assessment and Plan of Treatment: Your kidney function decreased due to dehydration from your increased diuretic medications while you were in the hospital. As your breathing improved, we decreased your diuretic medication dose again and your kidney function is now improving. Please follow-up with your primary care doctor within 1 week for repeat labwork to check your kidney function.    Diagnosis: Acute gout  Assessment and Plan of Treatment: You had sudden-onset of a painful right wrist. This is likely due to a gout flare in the setting of more aggressive diuresis. You were treated with medications to decrease the inflammation. Your symptoms are improving. Please follow-up with your primary care doctor. PRINCIPAL DISCHARGE DIAGNOSIS  Diagnosis: Acute on chronic congestive heart failure, unspecified heart failure type  Assessment and Plan of Treatment: You came to the hospital becuase you felt short of breat, likely from acute on chronic heart failure. You were treated with lasix during your hospital stay and your breathing improved. Please continue to take your medications as prescribed. Please follow up with your cardiologist after your are discharged.      SECONDARY DISCHARGE DIAGNOSES  Diagnosis: Chest pain, unspecified type  Assessment and Plan of Treatment: You had chest pain prior to coming to the hospital which had resolved since. Cardiac enzymes normal on admission. Please follow up with your cardiologist after discharged.    Diagnosis: TAO (acute kidney injury)  Assessment and Plan of Treatment: Your kidney function decreased due to dehydration from your increased diuretic medications while you were in the hospital. As your breathing improved, we decreased your diuretic medication dose again and your kidney function is now improving. Please follow-up with your primary care doctor within 1 week for repeat labwork to check your kidney function.    Diagnosis: Acute gout  Assessment and Plan of Treatment: You had sudden-onset of a painful right wrist. This is likely due to a gout flare in the setting of more aggressive diuresis. You were treated with medications to decrease the inflammation. Your symptoms are improving. Please continue to take the medication Colchicine until your symptoms resolve and please follow-up with your primary care doctor.

## 2020-02-16 NOTE — PROGRESS NOTE ADULT - PROBLEM SELECTOR PLAN 2
Increased erythema on RLE for the past 3 days, more tender than LLE. Duplex from 12/2019 neg for DVT. Cellulitis vs chronic venous stasis in the setting of CHF vs lymphedema. Stable  -monitor off antibiotics given leukocytosis resolved, afebrile  -dec lasix 40 IV BID  -continue home pain meds oxycontin 10 TID and gabapentin 300 BID (home gralise 600qd) for leg pain

## 2020-02-16 NOTE — DISCHARGE NOTE PROVIDER - HOSPITAL COURSE
81 y/o Wolof speaking F PMHx of HFpEF (EF 60-65%, stage II diastolic dysfunction), CAD (s/p NST showing areas of infarct, no prior caths), CKD (Stage III), Breast cancer (s/p R mastectomy in 1989, c/b chronic RUE lymphedema, rectal cancer (s/p reversed ileostomy in 2015), RLE DVT (2/2019, on Eliquis), chronic R hip pain, T2DM (A1C 6.7 in 12/2019), HTN, HLD, s/p knee replacement 2017, recent admission in December for RLE swelling and redness treated for CHF exacerbation presented to the ED with SOB and chest pain, admitted for likely CHF exacerbation. Patient was started on lasix 80 IV BID and then decreased to lasix 40 IV BID. 79 y/o Czech speaking F PMHx of HFpEF (EF 60-65%, stage II diastolic dysfunction), CAD (s/p NST showing areas of infarct, no prior caths), CKD (Stage III), Breast cancer (s/p R mastectomy in 1989, c/b chronic RUE lymphedema, rectal cancer (s/p reversed ileostomy in 2015), RLE DVT (2/2019, on Eliquis), chronic R hip pain, T2DM (A1C 6.7 in 12/2019), HTN, HLD, s/p knee replacement 2017, recent admission in December for RLE swelling and redness treated for CHF exacerbation presented to the ED with SOB and chest pain, admitted for likely CHF exacerbation. Patient was started on lasix 80 IV BID and patient's respiratory status improved with diuresis, however hospital course was complicated by TAO. Patient's Lasix dose was decreased to 20 PO BID, and serum Cr improved. Patient now medically stable for discharge with outpatient follow-up. 79 y/o German speaking F PMHx of HFpEF (EF 60-65%, stage II diastolic dysfunction), CAD (s/p NST showing areas of infarct, no prior caths), CKD (Stage III), Breast cancer (s/p R mastectomy in 1989, c/b chronic RUE lymphedema, rectal cancer (s/p reversed ileostomy in 2015), RLE DVT (2/2019, on Eliquis), chronic R hip pain, T2DM (A1C 6.7 in 12/2019), HTN, HLD, s/p knee replacement 2017, recent admission in December for RLE swelling and redness treated for CHF exacerbation presented to the ED with SOB and chest pain, admitted for likely CHF exacerbation. Patient was started on lasix 80 IV BID and patient's respiratory status improved with diuresis, however hospital course was complicated by TAO. Patient's baseline SCr 1.8. Patient's Lasix dose was decreased to 40 PO BID, and serum Cr improved. Patient now medically stable for discharge with outpatient follow-up with Cardiology within 5 days for repeat BMP. 81 y/o Persian speaking F PMHx of HFpEF (EF 60-65%, stage II diastolic dysfunction), CAD (s/p NST showing areas of infarct, no prior caths), CKD (Stage III), Breast cancer (s/p R mastectomy in 1989, c/b chronic RUE lymphedema, rectal cancer (s/p reversed ileostomy in 2015), RLE DVT (2/2019, on Eliquis), chronic R hip pain, T2DM (A1C 6.7 in 12/2019), HTN, HLD, s/p knee replacement 2017, recent admission in December for RLE swelling and redness treated for CHF exacerbation presented to the ED with SOB and chest pain, admitted for likely CHF exacerbation. Patient was started on lasix 80 IV BID and patient's respiratory status improved with diuresis, however hospital course was complicated by TAO. Patient's baseline SCr 1.8. Patient's Lasix dose was decreased to 40 PO BID, and serum Cr improved. Patient now medically stable for discharge with outpatient follow-up with Cardiology within 5 days for repeat BMP. Patient's course also complicated with acute gout flare at right wrist with trial of renally dosed colchicine. She developed an TAO on CKD. Colchicine was discontinued. Patient's renal function improved. She will be rxed prednisone 20 mg for 3 days. Patient and daughter agreeable to plan. Patient is hemodynamically stable for discharge to home. 81 y/o Polish speaking F PMHx of HFpEF (EF 60-65%, stage II diastolic dysfunction), CAD (s/p NST showing areas of infarct, no prior caths), CKD, Breast cancer (s/p R mastectomy in 1989, c/b chronic RUE lymphedema), rectal cancer (s/p reversed ileostomy in 2015), RLE DVT (2/2019, on Eliquis), chronic R hip pain, T2DM (A1C 6.7 in 12/2019), HTN, s/p knee replacement 2017, recent admission in December for RLE swelling and redness, treated for CHF exacerbation presented to the ED with SOB and chest pain, admitted for CHF exacerbation.     Started on lasix 80 IV BID and patient's respiratory status improved with diuresis, however hospital course was complicated by TAO. Patient's baseline SCr 1.8. Patient's Lasix dose was decreased to 40 PO BID, and serum Cr improved.     Patient's course also complicated with acute gout flare at right wrist causing severe pain and inability to walk with a walker. Episode likely precipitated by dieresis.     Started on Colchicine w some response. Given Prednisone w improvement.     Ambulated w PT. Discharged home w f/u. Email sent to PMD Dr Dawkins about course.        Diagnoses: Acute diastolic CHF: TAO; CKD 4; Prerenal azotemia; Acute gouty arthritis; DM2; Acute pain; HTN; Hypomagnesemia; 79 y/o Greek speaking F PMHx of HFpEF (EF 60-65%, stage II diastolic dysfunction), CAD (s/p NST showing areas of infarct, no prior caths), CKD, Breast cancer (s/p R mastectomy in 1989, c/b chronic RUE lymphedema), rectal cancer (s/p reversed ileostomy in 2015), RLE DVT (2/2019, on Eliquis), chronic R hip pain, T2DM (A1C 6.7 in 12/2019), HTN, s/p knee replacement 2017, recent admission in December for RLE swelling and redness, treated for CHF exacerbation presented to the ED with SOB and chest pain, admitted for CHF exacerbation.     Started on lasix 80 IV, respiratory status improved with diuresis, however hospital course was complicated by TAO. Patient's baseline SCr 1.8. Patient's Lasix dose was decreased to 40 PO BID, and serum Cr improved.     Patient's course also complicated with acute gout flare at right wrist causing severe pain and inability to walk with a walker. Episode likely precipitated by dieresis.     Started on Colchicine w some response. Given Prednisone w improvement.     Ambulated w PT. Discharged home w f/u. Email sent to PMD Dr Dawkins about course.        Diagnoses: Acute diastolic CHF: TAO; CKD 4; Prerenal azotemia; Acute gouty arthritis; DM2; Acute pain; HTN

## 2020-02-16 NOTE — PROGRESS NOTE ADULT - ASSESSMENT
79 y/o Icelandic speaking F PMHx of HFpEF (EF 60-65%, stage II diastolic dysfunction), CAD (s/p NST showing areas of infarct, no prior caths), CKD (Stage III), Breast cancer (s/p R mastectomy in 1989, c/b chronic RUE lymphedema, rectal cancer (s/p reversed ileostomy in 2015), RLE DVT (2/2019, on Eliquis), chronic R hip pain, T2DM (A1C 6.7 in 12/2019), HTN, HLD, s/p knee replacement 2017, recent admission in December for RLE swelling and redness treated for CHF exacerbation presented to the ED with SOB and chest pain, admitted for likely CHF exacerbation now being diuresed

## 2020-02-16 NOTE — DISCHARGE NOTE PROVIDER - PROVIDER TOKENS
PROVIDER:[TOKEN:[[id number]]] PROVIDER:[TOKEN:[[id number]],FOLLOWUP:[1 week]],PROVIDER:[TOKEN:[[id number]],FOLLOWUP:[1 week]]

## 2020-02-17 DIAGNOSIS — N17.9 ACUTE KIDNEY FAILURE, UNSPECIFIED: ICD-10-CM

## 2020-02-17 LAB
ANION GAP SERPL CALC-SCNC: 10 MMOL/L — SIGNIFICANT CHANGE UP (ref 5–17)
BUN SERPL-MCNC: 44 MG/DL — HIGH (ref 7–23)
CALCIUM SERPL-MCNC: 9.2 MG/DL — SIGNIFICANT CHANGE UP (ref 8.4–10.5)
CHLORIDE SERPL-SCNC: 89 MMOL/L — LOW (ref 96–108)
CO2 SERPL-SCNC: 33 MMOL/L — HIGH (ref 22–31)
CREAT SERPL-MCNC: 2.35 MG/DL — HIGH (ref 0.5–1.3)
GLUCOSE BLDC GLUCOMTR-MCNC: 157 MG/DL — HIGH (ref 70–99)
GLUCOSE BLDC GLUCOMTR-MCNC: 168 MG/DL — HIGH (ref 70–99)
GLUCOSE BLDC GLUCOMTR-MCNC: 180 MG/DL — HIGH (ref 70–99)
GLUCOSE BLDC GLUCOMTR-MCNC: 205 MG/DL — HIGH (ref 70–99)
GLUCOSE SERPL-MCNC: 157 MG/DL — HIGH (ref 70–99)
HCT VFR BLD CALC: 34.9 % — SIGNIFICANT CHANGE UP (ref 34.5–45)
HGB BLD-MCNC: 10.5 G/DL — LOW (ref 11.5–15.5)
MAGNESIUM SERPL-MCNC: 2.3 MG/DL — SIGNIFICANT CHANGE UP (ref 1.6–2.6)
MCHC RBC-ENTMCNC: 29.2 PG — SIGNIFICANT CHANGE UP (ref 27–34)
MCHC RBC-ENTMCNC: 30.1 GM/DL — LOW (ref 32–36)
MCV RBC AUTO: 96.9 FL — SIGNIFICANT CHANGE UP (ref 80–100)
NRBC # BLD: 0 /100 WBCS — SIGNIFICANT CHANGE UP (ref 0–0)
OSMOLALITY UR: 253 MOSM/KG — SIGNIFICANT CHANGE UP (ref 50–1200)
PHOSPHATE SERPL-MCNC: 4 MG/DL — SIGNIFICANT CHANGE UP (ref 2.5–4.5)
PLATELET # BLD AUTO: 250 K/UL — SIGNIFICANT CHANGE UP (ref 150–400)
POTASSIUM SERPL-MCNC: 3.9 MMOL/L — SIGNIFICANT CHANGE UP (ref 3.5–5.3)
POTASSIUM SERPL-SCNC: 3.9 MMOL/L — SIGNIFICANT CHANGE UP (ref 3.5–5.3)
RBC # BLD: 3.6 M/UL — LOW (ref 3.8–5.2)
RBC # FLD: 14.9 % — HIGH (ref 10.3–14.5)
SODIUM SERPL-SCNC: 132 MMOL/L — LOW (ref 135–145)
SODIUM UR-SCNC: 66 MMOL/L — SIGNIFICANT CHANGE UP
WBC # BLD: 7.17 K/UL — SIGNIFICANT CHANGE UP (ref 3.8–10.5)
WBC # FLD AUTO: 7.17 K/UL — SIGNIFICANT CHANGE UP (ref 3.8–10.5)

## 2020-02-17 PROCEDURE — 99233 SBSQ HOSP IP/OBS HIGH 50: CPT | Mod: GC

## 2020-02-17 RX ORDER — OXYCODONE HYDROCHLORIDE 5 MG/1
10 TABLET ORAL EVERY 8 HOURS
Refills: 0 | Status: DISCONTINUED | OUTPATIENT
Start: 2020-02-17 | End: 2020-02-24

## 2020-02-17 RX ORDER — POLYETHYLENE GLYCOL 3350 17 G/17G
17 POWDER, FOR SOLUTION ORAL
Refills: 0 | Status: DISCONTINUED | OUTPATIENT
Start: 2020-02-17 | End: 2020-02-24

## 2020-02-17 RX ORDER — FUROSEMIDE 40 MG
20 TABLET ORAL
Refills: 0 | Status: DISCONTINUED | OUTPATIENT
Start: 2020-02-17 | End: 2020-02-21

## 2020-02-17 RX ADMIN — Medication 1 SPRAY(S): at 05:09

## 2020-02-17 RX ADMIN — OXYCODONE HYDROCHLORIDE 10 MILLIGRAM(S): 5 TABLET ORAL at 21:25

## 2020-02-17 RX ADMIN — Medication 1 SPRAY(S): at 17:39

## 2020-02-17 RX ADMIN — Medication 100 MILLIGRAM(S): at 05:10

## 2020-02-17 RX ADMIN — OXYCODONE HYDROCHLORIDE 10 MILLIGRAM(S): 5 TABLET ORAL at 13:58

## 2020-02-17 RX ADMIN — ISOSORBIDE MONONITRATE 60 MILLIGRAM(S): 60 TABLET, EXTENDED RELEASE ORAL at 12:17

## 2020-02-17 RX ADMIN — GABAPENTIN 300 MILLIGRAM(S): 400 CAPSULE ORAL at 05:08

## 2020-02-17 RX ADMIN — Medication 40 MILLIGRAM(S): at 05:09

## 2020-02-17 RX ADMIN — OXYCODONE HYDROCHLORIDE 10 MILLIGRAM(S): 5 TABLET ORAL at 05:14

## 2020-02-17 RX ADMIN — Medication 3 MILLILITER(S): at 05:09

## 2020-02-17 RX ADMIN — Medication 25 MILLIGRAM(S): at 05:11

## 2020-02-17 RX ADMIN — OXYCODONE HYDROCHLORIDE 10 MILLIGRAM(S): 5 TABLET ORAL at 21:55

## 2020-02-17 RX ADMIN — Medication 3 MILLILITER(S): at 17:40

## 2020-02-17 RX ADMIN — Medication 1: at 08:11

## 2020-02-17 RX ADMIN — Medication 1 SPRAY(S): at 05:10

## 2020-02-17 RX ADMIN — APIXABAN 2.5 MILLIGRAM(S): 2.5 TABLET, FILM COATED ORAL at 17:40

## 2020-02-17 RX ADMIN — ATORVASTATIN CALCIUM 10 MILLIGRAM(S): 80 TABLET, FILM COATED ORAL at 21:24

## 2020-02-17 RX ADMIN — APIXABAN 2.5 MILLIGRAM(S): 2.5 TABLET, FILM COATED ORAL at 05:08

## 2020-02-17 RX ADMIN — OXYCODONE HYDROCHLORIDE 10 MILLIGRAM(S): 5 TABLET ORAL at 14:28

## 2020-02-17 RX ADMIN — GABAPENTIN 300 MILLIGRAM(S): 400 CAPSULE ORAL at 17:40

## 2020-02-17 RX ADMIN — Medication 1: at 12:19

## 2020-02-17 RX ADMIN — SENNA PLUS 2 TABLET(S): 8.6 TABLET ORAL at 21:25

## 2020-02-17 RX ADMIN — OXYCODONE HYDROCHLORIDE 10 MILLIGRAM(S): 5 TABLET ORAL at 05:45

## 2020-02-17 RX ADMIN — Medication 1: at 17:40

## 2020-02-17 RX ADMIN — SPIRONOLACTONE 12.5 MILLIGRAM(S): 25 TABLET, FILM COATED ORAL at 05:11

## 2020-02-17 NOTE — PROGRESS NOTE ADULT - PROBLEM SELECTOR PLAN 7
-c/w home atorvastatin 02/2019 on eliquis, duplex from 12/2019 showed no DVT  -continue eliquis for now and outpt f/u regarding when to stop

## 2020-02-17 NOTE — PROGRESS NOTE ADULT - PROBLEM SELECTOR PLAN 1
hypoxic to 89% at outpt clinic, SOB today, b/l basilar crackles, significant peripheral edema on home bumex 1mg BID. ProBNP 1072. Trop neg. Now satting well on NC2L. CXR clear, RVP neg  -decreased to 40 IV lasix BID   -check pro bnp upon dc  -strict I&Os  -monitor on tele  -continue home lopressor 25, spironolactone 12.5 qd  -Dr. Hagan contacted via email  -symptomatic support with saline spray and duoneb hypoxic to 89% at outpt clinic, SOB today, b/l basilar crackles, significant peripheral edema on home bumex 1mg BID. ProBNP 1072. Trop neg. Now satting well on NC2L. CXR clear, RVP neg  -Patient with worsening Cr and contraction alkalosis today, will decrease Lasix to 20mg PO BID  -Supplemental O2 PRN for SpO2 > 92% -- wean as tolerated  -check pro bnp upon dc  -strict I&Os  -monitor on tele  -continue home lopressor 25, spironolactone 12.5 qd  -Dr. Hagan contacted via email  -symptomatic support with saline spray and duoneb

## 2020-02-17 NOTE — PROGRESS NOTE ADULT - PROBLEM SELECTOR PLAN 6
02/2019 on eliquis, duplex from 12/2019 showed no DVT  -continue eliquis for now and outpt f/u regarding when to stop A1C 6.7 in 12/2019 on home glimperide and januvia  -ISS while inpatient  -FS qhs, qac  TAO on CKD  baseline Cr 1.5s-1.8s, now uptrending, cardiorenal vs diuretic induced vs obstruction  TAO  likely 2/2 CHF exacerbation vs obstruction vs diuretic induced  -given clinical s/s of CHF exacerbation, continue diuresis 80IV BID for now  -bladder scan to r/o obstruction  -trend BMP     Chronic hip pain  on home oxycontin 10TID and gralise 600qd  -oxycodone 10 TID and gabapentin 300 BID

## 2020-02-17 NOTE — PROGRESS NOTE ADULT - PROBLEM SELECTOR PLAN 3
UA noted for WBC and Leuk esterase, no dysuria  -monitor off antibiotics for now  -Ucx with Ecoli however patient asymptomatic Baseline serum Cr 1.8-2.0, serum Cr rising to 2.3 this AM, with mild hyponatremia. In setting of more aggressive diuresis, and with likely contraction alkalosis, so suspect 2/2 overdiuresis.  -bladder scan previously this admission with PVR 20  -check urine lytes, FeUrea

## 2020-02-17 NOTE — PROGRESS NOTE ADULT - PROBLEM SELECTOR PLAN 5
A1C 6.7 in 12/2019 on home glimperide and januvia  -ISS while inpatient    TAO on CKD  baseline Cr 1.5s-1.8s, now uptrending, cardiorenal vs diuretic induced vs obstruction  ATO  likely 2/2 CHF exacerbation vs obstruction vs diuretic induced  -given clinical s/s of CHF exacerbation, continue diuresis 80IV BID for now  -bladder scan to r/o obstruction  -trend BMP     Chronic hip pain  on home oxycontin 10TID and gralise 600qd  -oxycodone 10 TID and gabapentin 300 BID A1C 6.7 in 12/2019 on home glimperide and januvia  -ISS while inpatient  -FS qhs, qac  TAO on CKD  baseline Cr 1.5s-1.8s, now uptrending, cardiorenal vs diuretic induced vs obstruction  TAO  likely 2/2 CHF exacerbation vs obstruction vs diuretic induced  -given clinical s/s of CHF exacerbation, continue diuresis 80IV BID for now  -bladder scan to r/o obstruction  -trend BMP     Chronic hip pain  on home oxycontin 10TID and gralise 600qd  -oxycodone 10 TID and gabapentin 300 BID likely atypical CP, resolved spontaneously, neg trops  -EKG no change from prior  -continue home isosorbide mononitrate

## 2020-02-17 NOTE — PROGRESS NOTE ADULT - PROBLEM SELECTOR PLAN 2
Increased erythema on RLE for the past 3 days, more tender than LLE. Duplex from 12/2019 neg for DVT. Cellulitis vs chronic venous stasis in the setting of CHF vs lymphedema. Stable  -monitor off antibiotics given leukocytosis resolved, afebrile  -dec lasix 40 IV BID  -continue home pain meds oxycontin 10 TID and gabapentin 300 BID (home gralise 600qd) for leg pain Increased erythema on RLE for the past 3 days, more tender than LLE. Duplex from 12/2019 neg for DVT. Cellulitis vs chronic venous stasis in the setting of CHF vs lymphedema. Stable  -monitor off antibiotics given leukocytosis resolved, afebrile  -dec lasix as above  -continue home pain meds oxycontin 10 TID and gabapentin 300 BID (home gralise 600qd) for leg pain

## 2020-02-17 NOTE — PROGRESS NOTE ADULT - SUBJECTIVE AND OBJECTIVE BOX
Chely Zarate, PGY-1  Internal Medicine  Pager #: [phone] / [facility] [phone]    PROGRESS NOTE:     Patient is a 80y old  Female who presents with a chief complaint of SOB (16 Feb 2020 17:25)      SUBJECTIVE / OVERNIGHT EVENTS:  -No acute events overnight.    [employer] ID #[id number] used.    ADDITIONAL REVIEW OF SYSTEMS:    MEDICATIONS  (STANDING):  albuterol/ipratropium for Nebulization. 3 milliLiter(s) Nebulizer two times a day  apixaban 2.5 milliGRAM(s) Oral two times a day  atorvastatin 10 milliGRAM(s) Oral at bedtime  dextrose 5%. 1000 milliLiter(s) (50 mL/Hr) IV Continuous <Continuous>  dextrose 50% Injectable 12.5 Gram(s) IV Push once  dextrose 50% Injectable 25 Gram(s) IV Push once  dextrose 50% Injectable 25 Gram(s) IV Push once  fluticasone propionate 50 MICROgram(s)/spray Nasal Spray 1 Spray(s) Both Nostrils two times a day  furosemide   Injectable 40 milliGRAM(s) IV Push two times a day  gabapentin 300 milliGRAM(s) Oral two times a day  hydrALAZINE 100 milliGRAM(s) Oral two times a day  insulin lispro (HumaLOG) corrective regimen sliding scale   SubCutaneous three times a day before meals  insulin lispro (HumaLOG) corrective regimen sliding scale   SubCutaneous at bedtime  isosorbide   mononitrate ER Tablet (IMDUR) 60 milliGRAM(s) Oral daily  metoprolol succinate ER 25 milliGRAM(s) Oral daily  oxyCODONE  ER Tablet 10 milliGRAM(s) Oral every 8 hours  polyethylene glycol 3350 17 Gram(s) Oral daily  senna 2 Tablet(s) Oral at bedtime  sodium chloride 0.65% Nasal 1 Spray(s) Both Nostrils two times a day  spironolactone 12.5 milliGRAM(s) Oral daily    MEDICATIONS  (PRN):  acetaminophen   Tablet .. 975 milliGRAM(s) Oral every 6 hours PRN Moderate Pain (4 - 6)  dextrose 40% Gel 15 Gram(s) Oral once PRN Blood Glucose LESS THAN 70 milliGRAM(s)/deciliter  glucagon  Injectable 1 milliGRAM(s) IntraMuscular once PRN Glucose LESS THAN 70 milligrams/deciliter      CAPILLARY BLOOD GLUCOSE      POCT Blood Glucose.: 172 mg/dL (16 Feb 2020 21:16)  POCT Blood Glucose.: 163 mg/dL (16 Feb 2020 17:11)  POCT Blood Glucose.: 130 mg/dL (16 Feb 2020 11:46)    I&O's Summary    16 Feb 2020 07:01  -  17 Feb 2020 07:00  --------------------------------------------------------  IN: 900 mL / OUT: 1700 mL / NET: -800 mL        PHYSICAL EXAM:  Vital Signs Last 24 Hrs  T(C): 37 (17 Feb 2020 05:11), Max: 37 (16 Feb 2020 20:54)  T(F): 98.6 (17 Feb 2020 05:11), Max: 98.6 (16 Feb 2020 20:54)  HR: 71 (17 Feb 2020 05:11) (71 - 85)  BP: 136/68 (17 Feb 2020 05:11) (106/50 - 174/69)  BP(mean): --  RR: 18 (17 Feb 2020 05:11) (16 - 18)  SpO2: 99% (17 Feb 2020 05:11) (95% - 99%)    General: No acute distress on nasal cannula 2L   HEENT: NCAT.  PERRL.  EOMI.  No scleral icterus or injection.    Neck: Supple.  Full ROM.  No JVD.    Heart: RRR.  Normal S1 and S2.  No murmurs, rubs, or gallops.   Lungs: +crackles at the bases bilaterally   Abdomen: BS+, soft, NT/ND.   Skin: redness of b/l lower extremities  Extremities: 2+ pitting edema in b/l lower ext, non-pitting edema and erythema of left upper ext (chronic)  Musculoskeletal: No deformities.    Neuro: No neuro focal deficits   LABS:                        10.5   7.17  )-----------( 250      ( 17 Feb 2020 06:18 )             34.9     02-17    132<L>  |  89<L>  |  44<H>  ----------------------------<  157<H>  3.9   |  33<H>  |  2.35<H>    Ca    9.2      17 Feb 2020 06:18  Phos  4.0     02-17  Mg     2.3     02-17                  RADIOLOGY & ADDITIONAL TESTS:  Results Reviewed:   Imaging Personally Reviewed:  Electrocardiogram Personally Reviewed:    COORDINATION OF CARE:  Care Discussed with Consultants/Other Providers [Y/N]:  Prior or Outpatient Records Reviewed [Y/N]: Chely Zarate, PGY-1  Internal Medicine  Pager #: [phone] / [facility] [phone]    PROGRESS NOTE:     Patient is a 80y old  Female who presents with a chief complaint of SOB (16 Feb 2020 17:25)      SUBJECTIVE / OVERNIGHT EVENTS:  -No acute events overnight. Per RN, overnight nursing a    Patient Turkmen-speaking, [employer] ID #[id number] used.    ADDITIONAL REVIEW OF SYSTEMS:    MEDICATIONS  (STANDING):  albuterol/ipratropium for Nebulization. 3 milliLiter(s) Nebulizer two times a day  apixaban 2.5 milliGRAM(s) Oral two times a day  atorvastatin 10 milliGRAM(s) Oral at bedtime  dextrose 5%. 1000 milliLiter(s) (50 mL/Hr) IV Continuous <Continuous>  dextrose 50% Injectable 12.5 Gram(s) IV Push once  dextrose 50% Injectable 25 Gram(s) IV Push once  dextrose 50% Injectable 25 Gram(s) IV Push once  fluticasone propionate 50 MICROgram(s)/spray Nasal Spray 1 Spray(s) Both Nostrils two times a day  furosemide   Injectable 40 milliGRAM(s) IV Push two times a day  gabapentin 300 milliGRAM(s) Oral two times a day  hydrALAZINE 100 milliGRAM(s) Oral two times a day  insulin lispro (HumaLOG) corrective regimen sliding scale   SubCutaneous three times a day before meals  insulin lispro (HumaLOG) corrective regimen sliding scale   SubCutaneous at bedtime  isosorbide   mononitrate ER Tablet (IMDUR) 60 milliGRAM(s) Oral daily  metoprolol succinate ER 25 milliGRAM(s) Oral daily  oxyCODONE  ER Tablet 10 milliGRAM(s) Oral every 8 hours  polyethylene glycol 3350 17 Gram(s) Oral daily  senna 2 Tablet(s) Oral at bedtime  sodium chloride 0.65% Nasal 1 Spray(s) Both Nostrils two times a day  spironolactone 12.5 milliGRAM(s) Oral daily    MEDICATIONS  (PRN):  acetaminophen   Tablet .. 975 milliGRAM(s) Oral every 6 hours PRN Moderate Pain (4 - 6)  dextrose 40% Gel 15 Gram(s) Oral once PRN Blood Glucose LESS THAN 70 milliGRAM(s)/deciliter  glucagon  Injectable 1 milliGRAM(s) IntraMuscular once PRN Glucose LESS THAN 70 milligrams/deciliter      CAPILLARY BLOOD GLUCOSE      POCT Blood Glucose.: 172 mg/dL (16 Feb 2020 21:16)  POCT Blood Glucose.: 163 mg/dL (16 Feb 2020 17:11)  POCT Blood Glucose.: 130 mg/dL (16 Feb 2020 11:46)    I&O's Summary    16 Feb 2020 07:01  -  17 Feb 2020 07:00  --------------------------------------------------------  IN: 900 mL / OUT: 1700 mL / NET: -800 mL        PHYSICAL EXAM:  Vital Signs Last 24 Hrs  T(C): 37 (17 Feb 2020 05:11), Max: 37 (16 Feb 2020 20:54)  T(F): 98.6 (17 Feb 2020 05:11), Max: 98.6 (16 Feb 2020 20:54)  HR: 71 (17 Feb 2020 05:11) (71 - 85)  BP: 136/68 (17 Feb 2020 05:11) (106/50 - 174/69)  BP(mean): --  RR: 18 (17 Feb 2020 05:11) (16 - 18)  SpO2: 99% (17 Feb 2020 05:11) (95% - 99%)    General: No acute distress on nasal cannula 2L   HEENT: NCAT.  PERRL.  EOMI.  No scleral icterus or injection.    Neck: Supple.  Full ROM.  No JVD.    Heart: RRR.  Normal S1 and S2.  No murmurs, rubs, or gallops.   Lungs: +crackles at the bases bilaterally   Abdomen: BS+, soft, NT/ND.   Skin: redness of b/l lower extremities  Extremities: 2+ pitting edema in b/l lower ext, non-pitting edema and erythema of left upper ext (chronic)  Musculoskeletal: No deformities.    Neuro: No neuro focal deficits   LABS:                        10.5   7.17  )-----------( 250      ( 17 Feb 2020 06:18 )             34.9     02-17    132<L>  |  89<L>  |  44<H>  ----------------------------<  157<H>  3.9   |  33<H>  |  2.35<H>    Ca    9.2      17 Feb 2020 06:18  Phos  4.0     02-17  Mg     2.3     02-17                  RADIOLOGY & ADDITIONAL TESTS:  Results Reviewed:   Imaging Personally Reviewed:  Electrocardiogram Personally Reviewed:    COORDINATION OF CARE:  Care Discussed with Consultants/Other Providers [Y/N]:  Prior or Outpatient Records Reviewed [Y/N]: Chely Zarate, PGY-1  Internal Medicine  Pager #: [phone] / [facility] [phone]    PROGRESS NOTE:     Patient is a 80y old  Female who presents with a chief complaint of SOB (16 Feb 2020 17:25)      SUBJECTIVE / OVERNIGHT EVENTS:  -No acute events overnight. Per RN, overnight nursing a    Patient Urdu-speaking, [employer] ID #[id number] used. Patient still with dry cough, but denies SOB on NC. Reports breathing improved when lying flat, but reports 2-pillow orthopnea at baseline. RUE erythema and swelling at baseline. Unsure if lower extremities are more or less swollen than baseline. Patient constipated, reports last BM 2-3 days ago and stool was hard & small at that time.    ADDITIONAL REVIEW OF SYSTEMS:  Denies fever/chills, chest pain, SOB, abdominal pain, dysuria.    MEDICATIONS  (STANDING):  albuterol/ipratropium for Nebulization. 3 milliLiter(s) Nebulizer two times a day  apixaban 2.5 milliGRAM(s) Oral two times a day  atorvastatin 10 milliGRAM(s) Oral at bedtime  dextrose 5%. 1000 milliLiter(s) (50 mL/Hr) IV Continuous <Continuous>  dextrose 50% Injectable 12.5 Gram(s) IV Push once  dextrose 50% Injectable 25 Gram(s) IV Push once  dextrose 50% Injectable 25 Gram(s) IV Push once  fluticasone propionate 50 MICROgram(s)/spray Nasal Spray 1 Spray(s) Both Nostrils two times a day  furosemide   Injectable 40 milliGRAM(s) IV Push two times a day  gabapentin 300 milliGRAM(s) Oral two times a day  hydrALAZINE 100 milliGRAM(s) Oral two times a day  insulin lispro (HumaLOG) corrective regimen sliding scale   SubCutaneous three times a day before meals  insulin lispro (HumaLOG) corrective regimen sliding scale   SubCutaneous at bedtime  isosorbide   mononitrate ER Tablet (IMDUR) 60 milliGRAM(s) Oral daily  metoprolol succinate ER 25 milliGRAM(s) Oral daily  oxyCODONE  ER Tablet 10 milliGRAM(s) Oral every 8 hours  polyethylene glycol 3350 17 Gram(s) Oral daily  senna 2 Tablet(s) Oral at bedtime  sodium chloride 0.65% Nasal 1 Spray(s) Both Nostrils two times a day  spironolactone 12.5 milliGRAM(s) Oral daily    MEDICATIONS  (PRN):  acetaminophen   Tablet .. 975 milliGRAM(s) Oral every 6 hours PRN Moderate Pain (4 - 6)  dextrose 40% Gel 15 Gram(s) Oral once PRN Blood Glucose LESS THAN 70 milliGRAM(s)/deciliter  glucagon  Injectable 1 milliGRAM(s) IntraMuscular once PRN Glucose LESS THAN 70 milligrams/deciliter      CAPILLARY BLOOD GLUCOSE      POCT Blood Glucose.: 172 mg/dL (16 Feb 2020 21:16)  POCT Blood Glucose.: 163 mg/dL (16 Feb 2020 17:11)  POCT Blood Glucose.: 130 mg/dL (16 Feb 2020 11:46)    I&O's Summary    16 Feb 2020 07:01  -  17 Feb 2020 07:00  --------------------------------------------------------  IN: 900 mL / OUT: 1700 mL / NET: -800 mL        PHYSICAL EXAM:  Vital Signs Last 24 Hrs  T(C): 37 (17 Feb 2020 05:11), Max: 37 (16 Feb 2020 20:54)  T(F): 98.6 (17 Feb 2020 05:11), Max: 98.6 (16 Feb 2020 20:54)  HR: 71 (17 Feb 2020 05:11) (71 - 85)  BP: 136/68 (17 Feb 2020 05:11) (106/50 - 174/69)  BP(mean): --  RR: 18 (17 Feb 2020 05:11) (16 - 18)  SpO2: 99% (17 Feb 2020 05:11) (95% - 99%)    General: No acute distress on nasal cannula 2L   HEENT: NCAT.  PERRL.  EOMI.  No scleral icterus or injection.    Neck: Supple.  Full ROM.  No JVD.    Heart: RRR.  Normal S1 and S2.  No murmurs, rubs, or gallops.   Lungs: +crackles at the bases bilaterally   Abdomen: BS+, soft, NT/ND.   Skin: redness of b/l lower extremities  Extremities: trace pitting edema in b/l lower ext, non-pitting edema and erythema of left upper ext  Musculoskeletal: No deformities.    Neuro: No neuro focal deficits   LABS:                        10.5   7.17  )-----------( 250      ( 17 Feb 2020 06:18 )             34.9     02-17    132<L>  |  89<L>  |  44<H>  ----------------------------<  157<H>  3.9   |  33<H>  |  2.35<H>    Ca    9.2      17 Feb 2020 06:18  Phos  4.0     02-17  Mg     2.3     02-17                  RADIOLOGY & ADDITIONAL TESTS:  Results Reviewed:   Imaging Personally Reviewed:  Electrocardiogram Personally Reviewed:    COORDINATION OF CARE:  Care Discussed with Consultants/Other Providers [Y/N]:  Prior or Outpatient Records Reviewed [Y/N]:

## 2020-02-17 NOTE — PROGRESS NOTE ADULT - PROBLEM SELECTOR PLAN 10
-DVT: on eliquis  -Diet: consistent carb/DASH  -Dispo: PT eval--home, no skilled PT needed    Discharge planning  Transitions of Care Status:  1.  Name of PCP: Dr. Dawkins  2.  PCP Contacted on Admission: [X] Y    [ ] N    3.  PCP contacted at Discharge: [ ] Y    [ ] N    [ ] N/A  4.  Post-Discharge Appointment Date and Location:  5.  Summary of Handoff given to PCP: -DVT: on eliquis  -Diet: consistent carb/DASH  -Dispo: PT eval--home, no skilled PT needed.    Discharge planning  Transitions of Care Status:  1.  Name of PCP: Dr. Dawkins  2.  PCP Contacted on Admission: [X] Y    [ ] N    3.  PCP contacted at Discharge: [ ] Y    [ ] N    [ ] N/A  4.  Post-Discharge Appointment Date and Location:  5.  Summary of Handoff given to PCP:

## 2020-02-17 NOTE — PROGRESS NOTE ADULT - ASSESSMENT
81 y/o Occitan speaking F PMHx of HFpEF (EF 60-65%, stage II diastolic dysfunction), CAD (s/p NST showing areas of infarct, no prior caths), CKD (Stage III), Breast cancer (s/p R mastectomy in 1989, c/b chronic RUE lymphedema, rectal cancer (s/p reversed ileostomy in 2015), RLE DVT (2/2019, on Eliquis), chronic R hip pain, T2DM (A1C 6.7 in 12/2019), HTN, HLD, s/p knee replacement 2017, recent admission in December for RLE swelling and redness treated for CHF exacerbation presented to the ED with SOB and chest pain, admitted for likely CHF exacerbation now being diuresed 79 y/o Mohawk speaking F PMHx of HFpEF (EF 60-65%, stage II diastolic dysfunction), CAD (s/p NST showing areas of infarct, no prior caths), CKD (Stage III), Breast cancer (s/p R mastectomy in 1989, c/b chronic RUE lymphedema, rectal cancer (s/p reversed ileostomy in 2015), RLE DVT (2/2019, on Eliquis), chronic R hip pain, T2DM (A1C 6.7 in 12/2019), HTN, HLD, s/p knee replacement 2017, recent admission in December for RLE swelling and redness treated for CHF exacerbation presented to the ED with SOB and chest pain, admitted for likely CHF exacerbation now being diuresed.

## 2020-02-17 NOTE — PROGRESS NOTE ADULT - PROBLEM SELECTOR PLAN 4
likely atypical CP, resolved spontaneously, neg trops  -EKG no change from prior  -continue home isosorbide mononitrate UA noted for WBC and Leuk esterase, no dysuria  -monitor off antibiotics for now  -Ucx with Ecoli however patient asymptomatic

## 2020-02-18 LAB
ANION GAP SERPL CALC-SCNC: 13 MMOL/L — SIGNIFICANT CHANGE UP (ref 5–17)
BASE EXCESS BLDV CALC-SCNC: 10.7 MMOL/L — HIGH (ref -2–2)
BUN SERPL-MCNC: 48 MG/DL — HIGH (ref 7–23)
CA-I SERPL-SCNC: 1.14 MMOL/L — SIGNIFICANT CHANGE UP (ref 1.12–1.3)
CALCIUM SERPL-MCNC: 9.2 MG/DL — SIGNIFICANT CHANGE UP (ref 8.4–10.5)
CHLORIDE BLDV-SCNC: 94 MMOL/L — LOW (ref 96–108)
CHLORIDE SERPL-SCNC: 91 MMOL/L — LOW (ref 96–108)
CO2 BLDV-SCNC: 39 MMOL/L — HIGH (ref 22–30)
CO2 SERPL-SCNC: 29 MMOL/L — SIGNIFICANT CHANGE UP (ref 22–31)
CREAT SERPL-MCNC: 2.28 MG/DL — HIGH (ref 0.5–1.3)
GAS PNL BLDV: 131 MMOL/L — LOW (ref 135–145)
GAS PNL BLDV: SIGNIFICANT CHANGE UP
GLUCOSE BLDC GLUCOMTR-MCNC: 155 MG/DL — HIGH (ref 70–99)
GLUCOSE BLDC GLUCOMTR-MCNC: 191 MG/DL — HIGH (ref 70–99)
GLUCOSE BLDC GLUCOMTR-MCNC: 208 MG/DL — HIGH (ref 70–99)
GLUCOSE BLDC GLUCOMTR-MCNC: 257 MG/DL — HIGH (ref 70–99)
GLUCOSE BLDV-MCNC: 159 MG/DL — HIGH (ref 70–99)
GLUCOSE SERPL-MCNC: 161 MG/DL — HIGH (ref 70–99)
HCO3 BLDV-SCNC: 37 MMOL/L — HIGH (ref 21–29)
HCT VFR BLDA CALC: 33 % — LOW (ref 39–50)
HGB BLD CALC-MCNC: 10.6 G/DL — LOW (ref 11.5–15.5)
LACTATE BLDV-MCNC: 1.2 MMOL/L — SIGNIFICANT CHANGE UP (ref 0.7–2)
MAGNESIUM SERPL-MCNC: 2.4 MG/DL — SIGNIFICANT CHANGE UP (ref 1.6–2.6)
OTHER CELLS CSF MANUAL: 15 ML/DL — LOW (ref 18–22)
PCO2 BLDV: 62 MMHG — HIGH (ref 35–50)
PH BLDV: 7.4 — SIGNIFICANT CHANGE UP (ref 7.35–7.45)
PHOSPHATE SERPL-MCNC: 4.4 MG/DL — SIGNIFICANT CHANGE UP (ref 2.5–4.5)
PO2 BLDV: 184 MMHG — HIGH (ref 25–45)
POTASSIUM BLDV-SCNC: 3.9 MMOL/L — SIGNIFICANT CHANGE UP (ref 3.5–5.3)
POTASSIUM SERPL-MCNC: 4.3 MMOL/L — SIGNIFICANT CHANGE UP (ref 3.5–5.3)
POTASSIUM SERPL-SCNC: 4.3 MMOL/L — SIGNIFICANT CHANGE UP (ref 3.5–5.3)
SAO2 % BLDV: 100 % — HIGH (ref 67–88)
SODIUM SERPL-SCNC: 133 MMOL/L — LOW (ref 135–145)

## 2020-02-18 PROCEDURE — 71045 X-RAY EXAM CHEST 1 VIEW: CPT | Mod: 26

## 2020-02-18 PROCEDURE — 99232 SBSQ HOSP IP/OBS MODERATE 35: CPT | Mod: GC

## 2020-02-18 RX ADMIN — Medication 3: at 11:54

## 2020-02-18 RX ADMIN — Medication 1 SPRAY(S): at 17:36

## 2020-02-18 RX ADMIN — Medication 100 MILLIGRAM(S): at 05:38

## 2020-02-18 RX ADMIN — OXYCODONE HYDROCHLORIDE 10 MILLIGRAM(S): 5 TABLET ORAL at 21:00

## 2020-02-18 RX ADMIN — OXYCODONE HYDROCHLORIDE 10 MILLIGRAM(S): 5 TABLET ORAL at 17:33

## 2020-02-18 RX ADMIN — Medication 1 SPRAY(S): at 05:38

## 2020-02-18 RX ADMIN — Medication 3 MILLILITER(S): at 21:00

## 2020-02-18 RX ADMIN — Medication 25 MILLIGRAM(S): at 05:37

## 2020-02-18 RX ADMIN — ATORVASTATIN CALCIUM 10 MILLIGRAM(S): 80 TABLET, FILM COATED ORAL at 21:00

## 2020-02-18 RX ADMIN — OXYCODONE HYDROCHLORIDE 10 MILLIGRAM(S): 5 TABLET ORAL at 15:44

## 2020-02-18 RX ADMIN — Medication 2: at 17:35

## 2020-02-18 RX ADMIN — APIXABAN 2.5 MILLIGRAM(S): 2.5 TABLET, FILM COATED ORAL at 05:37

## 2020-02-18 RX ADMIN — OXYCODONE HYDROCHLORIDE 10 MILLIGRAM(S): 5 TABLET ORAL at 06:00

## 2020-02-18 RX ADMIN — GABAPENTIN 300 MILLIGRAM(S): 400 CAPSULE ORAL at 05:37

## 2020-02-18 RX ADMIN — Medication 100 MILLIGRAM(S): at 17:35

## 2020-02-18 RX ADMIN — Medication 20 MILLIGRAM(S): at 05:37

## 2020-02-18 RX ADMIN — Medication 1: at 08:35

## 2020-02-18 RX ADMIN — Medication 3 MILLILITER(S): at 05:37

## 2020-02-18 RX ADMIN — APIXABAN 2.5 MILLIGRAM(S): 2.5 TABLET, FILM COATED ORAL at 17:35

## 2020-02-18 RX ADMIN — ISOSORBIDE MONONITRATE 60 MILLIGRAM(S): 60 TABLET, EXTENDED RELEASE ORAL at 11:55

## 2020-02-18 RX ADMIN — SPIRONOLACTONE 12.5 MILLIGRAM(S): 25 TABLET, FILM COATED ORAL at 05:37

## 2020-02-18 RX ADMIN — OXYCODONE HYDROCHLORIDE 10 MILLIGRAM(S): 5 TABLET ORAL at 21:30

## 2020-02-18 RX ADMIN — GABAPENTIN 300 MILLIGRAM(S): 400 CAPSULE ORAL at 17:35

## 2020-02-18 RX ADMIN — OXYCODONE HYDROCHLORIDE 10 MILLIGRAM(S): 5 TABLET ORAL at 05:39

## 2020-02-18 RX ADMIN — Medication 20 MILLIGRAM(S): at 17:35

## 2020-02-18 NOTE — PROGRESS NOTE ADULT - SUBJECTIVE AND OBJECTIVE BOX
Chely Zarate, PGY-1  Internal Medicine  Pager #: [phone] / [facility] [phone]    PROGRESS NOTE:     Patient is a 80y old  Female who presents with a chief complaint of SOB (17 Feb 2020 07:29)      SUBJECTIVE / OVERNIGHT EVENTS:    ADDITIONAL REVIEW OF SYSTEMS:    MEDICATIONS  (STANDING):  albuterol/ipratropium for Nebulization. 3 milliLiter(s) Nebulizer two times a day  apixaban 2.5 milliGRAM(s) Oral two times a day  atorvastatin 10 milliGRAM(s) Oral at bedtime  dextrose 5%. 1000 milliLiter(s) (50 mL/Hr) IV Continuous <Continuous>  dextrose 50% Injectable 12.5 Gram(s) IV Push once  dextrose 50% Injectable 25 Gram(s) IV Push once  dextrose 50% Injectable 25 Gram(s) IV Push once  fluticasone propionate 50 MICROgram(s)/spray Nasal Spray 1 Spray(s) Both Nostrils two times a day  furosemide    Tablet 20 milliGRAM(s) Oral two times a day  gabapentin 300 milliGRAM(s) Oral two times a day  hydrALAZINE 100 milliGRAM(s) Oral two times a day  insulin lispro (HumaLOG) corrective regimen sliding scale   SubCutaneous three times a day before meals  insulin lispro (HumaLOG) corrective regimen sliding scale   SubCutaneous at bedtime  isosorbide   mononitrate ER Tablet (IMDUR) 60 milliGRAM(s) Oral daily  metoprolol succinate ER 25 milliGRAM(s) Oral daily  oxyCODONE  ER Tablet 10 milliGRAM(s) Oral every 8 hours  polyethylene glycol 3350 17 Gram(s) Oral two times a day  senna 2 Tablet(s) Oral at bedtime  sodium chloride 0.65% Nasal 1 Spray(s) Both Nostrils two times a day  spironolactone 12.5 milliGRAM(s) Oral daily    MEDICATIONS  (PRN):  acetaminophen   Tablet .. 975 milliGRAM(s) Oral every 6 hours PRN Moderate Pain (4 - 6)  bisacodyl 5 milliGRAM(s) Oral every 12 hours PRN Constipation  dextrose 40% Gel 15 Gram(s) Oral once PRN Blood Glucose LESS THAN 70 milliGRAM(s)/deciliter  glucagon  Injectable 1 milliGRAM(s) IntraMuscular once PRN Glucose LESS THAN 70 milligrams/deciliter      CAPILLARY BLOOD GLUCOSE      POCT Blood Glucose.: 205 mg/dL (17 Feb 2020 21:25)  POCT Blood Glucose.: 180 mg/dL (17 Feb 2020 16:53)  POCT Blood Glucose.: 168 mg/dL (17 Feb 2020 11:58)  POCT Blood Glucose.: 157 mg/dL (17 Feb 2020 08:00)    I&O's Summary    17 Feb 2020 07:01  -  18 Feb 2020 07:00  --------------------------------------------------------  IN: 1255 mL / OUT: 2100 mL / NET: -845 mL        PHYSICAL EXAM:  Vital Signs Last 24 Hrs  T(C): 36.4 (18 Feb 2020 05:41), Max: 37.3 (17 Feb 2020 13:48)  T(F): 97.6 (18 Feb 2020 05:41), Max: 99.2 (17 Feb 2020 13:48)  HR: 70 (18 Feb 2020 05:41) (70 - 78)  BP: 143/64 (18 Feb 2020 05:41) (96/59 - 143/64)  BP(mean): --  RR: 18 (18 Feb 2020 05:41) (16 - 18)  SpO2: 99% (18 Feb 2020 05:41) (87% - 99%)    General: No acute distress on nasal cannula 2L   HEENT: NCAT.  PERRL.  EOMI.  No scleral icterus or injection.    Neck: Supple.  Full ROM.  No JVD.    Heart: RRR.  Normal S1 and S2.  No murmurs, rubs, or gallops.   Lungs: +crackles at the bases bilaterally   Abdomen: BS+, soft, NT/ND.   Skin: redness of b/l lower extremities  Extremities: trace pitting edema in b/l lower ext, non-pitting edema and erythema of left upper ext  Musculoskeletal: No deformities.    Neuro: No neuro focal deficits      LABS:                        10.5   7.17  )-----------( 250      ( 17 Feb 2020 06:18 )             34.9     02-18    133<L>  |  91<L>  |  48<H>  ----------------------------<  161<H>  4.3   |  29  |  2.28<H>    Ca    9.2      18 Feb 2020 06:22  Phos  4.4     02-18  Mg     2.4     02-18                  RADIOLOGY & ADDITIONAL TESTS:  Results Reviewed:   Imaging Personally Reviewed:  Electrocardiogram Personally Reviewed:    COORDINATION OF CARE:  Care Discussed with Consultants/Other Providers [Y/N]:  Prior or Outpatient Records Reviewed [Y/N]: Chely Zarate, PGY-1  Internal Medicine  Pager #: [phone] / [facility] [phone]    PROGRESS NOTE:     Patient is a 80y old  Female who presents with a chief complaint of SOB (17 Feb 2020 07:29)    SUBJECTIVE / OVERNIGHT EVENTS:  Patient with cough, nasal congestion. No fever/chills. No chest pain, SOB.      ADDITIONAL REVIEW OF SYSTEMS:  No abd pain, N/V/D, extremity pain/swelling.    MEDICATIONS  (STANDING):  albuterol/ipratropium for Nebulization. 3 milliLiter(s) Nebulizer two times a day  apixaban 2.5 milliGRAM(s) Oral two times a day  atorvastatin 10 milliGRAM(s) Oral at bedtime  dextrose 5%. 1000 milliLiter(s) (50 mL/Hr) IV Continuous <Continuous>  dextrose 50% Injectable 12.5 Gram(s) IV Push once  dextrose 50% Injectable 25 Gram(s) IV Push once  dextrose 50% Injectable 25 Gram(s) IV Push once  fluticasone propionate 50 MICROgram(s)/spray Nasal Spray 1 Spray(s) Both Nostrils two times a day  furosemide    Tablet 20 milliGRAM(s) Oral two times a day  gabapentin 300 milliGRAM(s) Oral two times a day  hydrALAZINE 100 milliGRAM(s) Oral two times a day  insulin lispro (HumaLOG) corrective regimen sliding scale   SubCutaneous three times a day before meals  insulin lispro (HumaLOG) corrective regimen sliding scale   SubCutaneous at bedtime  isosorbide   mononitrate ER Tablet (IMDUR) 60 milliGRAM(s) Oral daily  metoprolol succinate ER 25 milliGRAM(s) Oral daily  oxyCODONE  ER Tablet 10 milliGRAM(s) Oral every 8 hours  polyethylene glycol 3350 17 Gram(s) Oral two times a day  senna 2 Tablet(s) Oral at bedtime  sodium chloride 0.65% Nasal 1 Spray(s) Both Nostrils two times a day  spironolactone 12.5 milliGRAM(s) Oral daily    MEDICATIONS  (PRN):  acetaminophen   Tablet .. 975 milliGRAM(s) Oral every 6 hours PRN Moderate Pain (4 - 6)  bisacodyl 5 milliGRAM(s) Oral every 12 hours PRN Constipation  dextrose 40% Gel 15 Gram(s) Oral once PRN Blood Glucose LESS THAN 70 milliGRAM(s)/deciliter  glucagon  Injectable 1 milliGRAM(s) IntraMuscular once PRN Glucose LESS THAN 70 milligrams/deciliter      CAPILLARY BLOOD GLUCOSE      POCT Blood Glucose.: 205 mg/dL (17 Feb 2020 21:25)  POCT Blood Glucose.: 180 mg/dL (17 Feb 2020 16:53)  POCT Blood Glucose.: 168 mg/dL (17 Feb 2020 11:58)  POCT Blood Glucose.: 157 mg/dL (17 Feb 2020 08:00)    I&O's Summary    17 Feb 2020 07:01  -  18 Feb 2020 07:00  --------------------------------------------------------  IN: 1255 mL / OUT: 2100 mL / NET: -845 mL        PHYSICAL EXAM:  Vital Signs Last 24 Hrs  T(C): 36.4 (18 Feb 2020 05:41), Max: 37.3 (17 Feb 2020 13:48)  T(F): 97.6 (18 Feb 2020 05:41), Max: 99.2 (17 Feb 2020 13:48)  HR: 70 (18 Feb 2020 05:41) (70 - 78)  BP: 143/64 (18 Feb 2020 05:41) (96/59 - 143/64)  BP(mean): --  RR: 18 (18 Feb 2020 05:41) (16 - 18)  SpO2: 99% (18 Feb 2020 05:41) (87% - 99%)    General: No acute distress on nasal cannula 2L   HEENT: NCAT.  PERRL.  EOMI.  No scleral icterus or injection.    Neck: Supple.  Full ROM.  No JVD.    Heart: RRR.  Normal S1 and S2.  No murmurs, rubs, or gallops.   Lungs: +crackles at the bases bilaterally   Abdomen: BS+, soft, NT/ND.   Skin: redness of b/l lower extremities  Extremities: trace pitting edema in b/l lower ext, non-pitting edema and erythema of left upper ext  Musculoskeletal: No deformities.    Neuro: No neuro focal deficits      LABS:                        10.5   7.17  )-----------( 250      ( 17 Feb 2020 06:18 )             34.9     02-18    133<L>  |  91<L>  |  48<H>  ----------------------------<  161<H>  4.3   |  29  |  2.28<H>    Ca    9.2      18 Feb 2020 06:22  Phos  4.4     02-18  Mg     2.4     02-18                  RADIOLOGY & ADDITIONAL TESTS:  Results Reviewed:   Imaging Personally Reviewed:  Electrocardiogram Personally Reviewed:    COORDINATION OF CARE:  Care Discussed with Consultants/Other Providers [Y/N]:  Prior or Outpatient Records Reviewed [Y/N]: Chely Zarate, PGY-1  Internal Medicine  Pager #: [phone] / [facility] [phone]    PROGRESS NOTE:     Patient is a 80y old  Female who presents with a chief complaint of SOB (17 Feb 2020 07:29)    SUBJECTIVE / OVERNIGHT EVENTS:  Patient with cough, nasal congestion. No fever/chills. No chest pain, SOB.    ADDITIONAL REVIEW OF SYSTEMS:  No abd pain, N/V/D, extremity pain/swelling.    MEDICATIONS  (STANDING):  albuterol/ipratropium for Nebulization. 3 milliLiter(s) Nebulizer two times a day  apixaban 2.5 milliGRAM(s) Oral two times a day  atorvastatin 10 milliGRAM(s) Oral at bedtime  dextrose 5%. 1000 milliLiter(s) (50 mL/Hr) IV Continuous <Continuous>  dextrose 50% Injectable 12.5 Gram(s) IV Push once  dextrose 50% Injectable 25 Gram(s) IV Push once  dextrose 50% Injectable 25 Gram(s) IV Push once  fluticasone propionate 50 MICROgram(s)/spray Nasal Spray 1 Spray(s) Both Nostrils two times a day  furosemide    Tablet 20 milliGRAM(s) Oral two times a day  gabapentin 300 milliGRAM(s) Oral two times a day  hydrALAZINE 100 milliGRAM(s) Oral two times a day  insulin lispro (HumaLOG) corrective regimen sliding scale   SubCutaneous three times a day before meals  insulin lispro (HumaLOG) corrective regimen sliding scale   SubCutaneous at bedtime  isosorbide   mononitrate ER Tablet (IMDUR) 60 milliGRAM(s) Oral daily  metoprolol succinate ER 25 milliGRAM(s) Oral daily  oxyCODONE  ER Tablet 10 milliGRAM(s) Oral every 8 hours  polyethylene glycol 3350 17 Gram(s) Oral two times a day  senna 2 Tablet(s) Oral at bedtime  sodium chloride 0.65% Nasal 1 Spray(s) Both Nostrils two times a day  spironolactone 12.5 milliGRAM(s) Oral daily    MEDICATIONS  (PRN):  acetaminophen   Tablet .. 975 milliGRAM(s) Oral every 6 hours PRN Moderate Pain (4 - 6)  bisacodyl 5 milliGRAM(s) Oral every 12 hours PRN Constipation  dextrose 40% Gel 15 Gram(s) Oral once PRN Blood Glucose LESS THAN 70 milliGRAM(s)/deciliter  glucagon  Injectable 1 milliGRAM(s) IntraMuscular once PRN Glucose LESS THAN 70 milligrams/deciliter      CAPILLARY BLOOD GLUCOSE      POCT Blood Glucose.: 205 mg/dL (17 Feb 2020 21:25)  POCT Blood Glucose.: 180 mg/dL (17 Feb 2020 16:53)  POCT Blood Glucose.: 168 mg/dL (17 Feb 2020 11:58)  POCT Blood Glucose.: 157 mg/dL (17 Feb 2020 08:00)    I&O's Summary    17 Feb 2020 07:01  -  18 Feb 2020 07:00  --------------------------------------------------------  IN: 1255 mL / OUT: 2100 mL / NET: -845 mL        PHYSICAL EXAM:  Vital Signs Last 24 Hrs  T(C): 36.4 (18 Feb 2020 05:41), Max: 37.3 (17 Feb 2020 13:48)  T(F): 97.6 (18 Feb 2020 05:41), Max: 99.2 (17 Feb 2020 13:48)  HR: 70 (18 Feb 2020 05:41) (70 - 78)  BP: 143/64 (18 Feb 2020 05:41) (96/59 - 143/64)  BP(mean): --  RR: 18 (18 Feb 2020 05:41) (16 - 18)  SpO2: 99% (18 Feb 2020 05:41) (87% - 99%)    General: No acute distress on nasal cannula 2L   HEENT: NCAT.  PERRL.  EOMI.  No scleral icterus or injection.    Neck: Supple.  Full ROM.  No JVD.    Heart: RRR.  Normal S1 and S2.  No murmurs, rubs, or gallops.   Lungs: +crackles at the bases bilaterally   Abdomen: BS+, soft, NT/ND.   Skin: redness of b/l lower extremities  Extremities: trace pitting edema in b/l lower ext, non-pitting edema and erythema of left upper ext  Musculoskeletal: No deformities.    Neuro: No neuro focal deficits      LABS:                        10.5   7.17  )-----------( 250      ( 17 Feb 2020 06:18 )             34.9     02-18    133<L>  |  91<L>  |  48<H>  ----------------------------<  161<H>  4.3   |  29  |  2.28<H>    Ca    9.2      18 Feb 2020 06:22  Phos  4.4     02-18  Mg     2.4     02-18                  RADIOLOGY & ADDITIONAL TESTS:  Results Reviewed:   Imaging Personally Reviewed:  Electrocardiogram Personally Reviewed:    COORDINATION OF CARE:  Care Discussed with Consultants/Other Providers [Y/N]:  Prior or Outpatient Records Reviewed [Y/N]:

## 2020-02-18 NOTE — PROGRESS NOTE ADULT - ASSESSMENT
81 y/o Belarusian speaking F PMHx of HFpEF (EF 60-65%, stage II diastolic dysfunction), CAD (s/p NST showing areas of infarct, no prior caths), CKD (Stage III), Breast cancer (s/p R mastectomy in 1989, c/b chronic RUE lymphedema, rectal cancer (s/p reversed ileostomy in 2015), RLE DVT (2/2019, on Eliquis), chronic R hip pain, T2DM (A1C 6.7 in 12/2019), HTN, HLD, s/p knee replacement 2017, recent admission in December for RLE swelling and redness treated for CHF exacerbation presented to the ED with SOB and chest pain, admitted for likely CHF exacerbation now being diuresed.

## 2020-02-18 NOTE — PROGRESS NOTE ADULT - PROBLEM SELECTOR PLAN 3
Baseline serum Cr 1.8-2.0, serum Cr rising to 2.3 this AM, with mild hyponatremia. In setting of more aggressive diuresis, and with likely contraction alkalosis, so suspect 2/2 overdiuresis.  -bladder scan previously this admission with PVR 20  -check urine lytes, FeUrea

## 2020-02-18 NOTE — PROGRESS NOTE ADULT - PROBLEM SELECTOR PLAN 10
-DVT: on eliquis  -Diet: consistent carb/DASH  -Dispo: PT eval--home, no skilled PT needed.    Discharge planning  Transitions of Care Status:  1.  Name of PCP: Dr. Dawkins  2.  PCP Contacted on Admission: [X] Y    [ ] N    3.  PCP contacted at Discharge: [ ] Y    [ ] N    [ ] N/A  4.  Post-Discharge Appointment Date and Location:  5.  Summary of Handoff given to PCP:

## 2020-02-18 NOTE — PROGRESS NOTE ADULT - PROBLEM SELECTOR PLAN 6
A1C 6.7 in 12/2019 on home glimperide and januvia  -ISS while inpatient  -FS qhs, qac  TAO on CKD  baseline Cr 1.5s-1.8s, now uptrending, cardiorenal vs diuretic induced vs obstruction  TAO  likely 2/2 CHF exacerbation vs obstruction vs diuretic induced  -given clinical s/s of CHF exacerbation, continue diuresis 80IV BID for now  -bladder scan to r/o obstruction  -trend BMP     Chronic hip pain  on home oxycontin 10TID and gralise 600qd  -oxycodone 10 TID and gabapentin 300 BID

## 2020-02-18 NOTE — PROGRESS NOTE ADULT - PROBLEM SELECTOR PLAN 2
Increased erythema on RLE for the past 3 days, more tender than LLE. Duplex from 12/2019 neg for DVT. Cellulitis vs chronic venous stasis in the setting of CHF vs lymphedema. Stable  -monitor off antibiotics given leukocytosis resolved, afebrile  -dec lasix as above  -continue home pain meds oxycontin 10 TID and gabapentin 300 BID (home gralise 600qd) for leg pain

## 2020-02-19 LAB
ANION GAP SERPL CALC-SCNC: 15 MMOL/L — SIGNIFICANT CHANGE UP (ref 5–17)
BUN SERPL-MCNC: 47 MG/DL — HIGH (ref 7–23)
CALCIUM SERPL-MCNC: 9.5 MG/DL — SIGNIFICANT CHANGE UP (ref 8.4–10.5)
CHLORIDE SERPL-SCNC: 91 MMOL/L — LOW (ref 96–108)
CO2 SERPL-SCNC: 29 MMOL/L — SIGNIFICANT CHANGE UP (ref 22–31)
CREAT SERPL-MCNC: 2.05 MG/DL — HIGH (ref 0.5–1.3)
GAS PNL BLDA: SIGNIFICANT CHANGE UP
GLUCOSE BLDC GLUCOMTR-MCNC: 191 MG/DL — HIGH (ref 70–99)
GLUCOSE BLDC GLUCOMTR-MCNC: 209 MG/DL — HIGH (ref 70–99)
GLUCOSE BLDC GLUCOMTR-MCNC: 209 MG/DL — HIGH (ref 70–99)
GLUCOSE BLDC GLUCOMTR-MCNC: 217 MG/DL — HIGH (ref 70–99)
GLUCOSE SERPL-MCNC: 172 MG/DL — HIGH (ref 70–99)
MAGNESIUM SERPL-MCNC: 2.4 MG/DL — SIGNIFICANT CHANGE UP (ref 1.6–2.6)
PHOSPHATE SERPL-MCNC: 3.8 MG/DL — SIGNIFICANT CHANGE UP (ref 2.5–4.5)
POTASSIUM SERPL-MCNC: 4.2 MMOL/L — SIGNIFICANT CHANGE UP (ref 3.5–5.3)
POTASSIUM SERPL-SCNC: 4.2 MMOL/L — SIGNIFICANT CHANGE UP (ref 3.5–5.3)
SODIUM SERPL-SCNC: 135 MMOL/L — SIGNIFICANT CHANGE UP (ref 135–145)

## 2020-02-19 PROCEDURE — 99232 SBSQ HOSP IP/OBS MODERATE 35: CPT | Mod: GC

## 2020-02-19 RX ORDER — POLYETHYLENE GLYCOL 3350 17 G/17G
17 POWDER, FOR SOLUTION ORAL
Qty: 0 | Refills: 0 | DISCHARGE
Start: 2020-02-19

## 2020-02-19 RX ADMIN — ISOSORBIDE MONONITRATE 60 MILLIGRAM(S): 60 TABLET, EXTENDED RELEASE ORAL at 12:19

## 2020-02-19 RX ADMIN — Medication 25 MILLIGRAM(S): at 05:29

## 2020-02-19 RX ADMIN — APIXABAN 2.5 MILLIGRAM(S): 2.5 TABLET, FILM COATED ORAL at 05:28

## 2020-02-19 RX ADMIN — Medication 2: at 17:25

## 2020-02-19 RX ADMIN — Medication 100 MILLIGRAM(S): at 05:28

## 2020-02-19 RX ADMIN — OXYCODONE HYDROCHLORIDE 10 MILLIGRAM(S): 5 TABLET ORAL at 17:24

## 2020-02-19 RX ADMIN — Medication 975 MILLIGRAM(S): at 07:00

## 2020-02-19 RX ADMIN — SPIRONOLACTONE 12.5 MILLIGRAM(S): 25 TABLET, FILM COATED ORAL at 05:29

## 2020-02-19 RX ADMIN — Medication 1 SPRAY(S): at 17:25

## 2020-02-19 RX ADMIN — Medication 3 MILLILITER(S): at 05:30

## 2020-02-19 RX ADMIN — Medication 3: at 09:00

## 2020-02-19 RX ADMIN — Medication 1 SPRAY(S): at 05:29

## 2020-02-19 RX ADMIN — GABAPENTIN 300 MILLIGRAM(S): 400 CAPSULE ORAL at 05:29

## 2020-02-19 RX ADMIN — OXYCODONE HYDROCHLORIDE 10 MILLIGRAM(S): 5 TABLET ORAL at 06:05

## 2020-02-19 RX ADMIN — APIXABAN 2.5 MILLIGRAM(S): 2.5 TABLET, FILM COATED ORAL at 17:24

## 2020-02-19 RX ADMIN — OXYCODONE HYDROCHLORIDE 10 MILLIGRAM(S): 5 TABLET ORAL at 05:29

## 2020-02-19 RX ADMIN — Medication 3 MILLILITER(S): at 17:24

## 2020-02-19 RX ADMIN — Medication 100 MILLIGRAM(S): at 17:24

## 2020-02-19 RX ADMIN — GABAPENTIN 300 MILLIGRAM(S): 400 CAPSULE ORAL at 17:24

## 2020-02-19 RX ADMIN — Medication 20 MILLIGRAM(S): at 05:28

## 2020-02-19 RX ADMIN — Medication 975 MILLIGRAM(S): at 06:36

## 2020-02-19 RX ADMIN — OXYCODONE HYDROCHLORIDE 10 MILLIGRAM(S): 5 TABLET ORAL at 21:16

## 2020-02-19 RX ADMIN — Medication 1: at 12:19

## 2020-02-19 RX ADMIN — OXYCODONE HYDROCHLORIDE 10 MILLIGRAM(S): 5 TABLET ORAL at 06:30

## 2020-02-19 RX ADMIN — ATORVASTATIN CALCIUM 10 MILLIGRAM(S): 80 TABLET, FILM COATED ORAL at 21:12

## 2020-02-19 RX ADMIN — Medication 20 MILLIGRAM(S): at 17:24

## 2020-02-19 NOTE — DIETITIAN INITIAL EVALUATION ADULT. - REASON INDICATOR FOR ASSESSMENT
Nutrition consult warranted for RD assessment; CHF STAR pt  Source: EMR, Pt, RN, Prior RD note, [facility]  ( ID# [id number])  Pt Thai speaking

## 2020-02-19 NOTE — DIETITIAN INITIAL EVALUATION ADULT. - PERTINENT LABORATORY DATA
02-19 Na135 mmol/L Glu 172 mg/dL<H> K+ 4.2 mmol/L Cr  2.05 mg/dL<H> BUN 47 mg/dL<H> Phos 3.8 mg/dL Alb n/a   PAB n/a

## 2020-02-19 NOTE — CHART NOTE - NSCHARTNOTEFT_GEN_A_CORE
RD CHF education chart note    Pt [place] speaking, [place]  Services used ( ID# [id number])    Patient was visited by RD for CHF education. Heart failure education provided to the patient in detail. Discussed heart failure nutrition therapy, sodium and fluid intake, importance of diet adherence, daily weights monitoring with the patient. Reinforced importance of weight gain parameters and importance of contacting MD’s about weight changes. Provided handouts on heart failure nutrition therapy, reading heart healthy nutrition labels, heart healthy shopping tips and low sodium food list - Pt noted both her son and daughter can help translate literature, they both speak english. Patient verbalized understanding demonstrated by teach back method. RD contact information left with patient for any future questioning.    RD remains available   Allyson Davis MS, RD, CDN  pager #[phone]

## 2020-02-19 NOTE — DIETITIAN INITIAL EVALUATION ADULT. - PHYSICAL APPEARANCE
other (specify)/obese Ht: 5'0" Wt: 185 (stated UBW) BMI: 36.2 kg/m2 IBW: 100 pounds (+/-10%) 185%IBW  Edema: 1+ L leg; 2+ R leg  Skin per nursing flowsheets: no pressure injuries noted

## 2020-02-19 NOTE — DIETITIAN INITIAL EVALUATION ADULT. - ADD RECOMMEND
1. Continue Consistent CHO, DASH diet as ordered. 2. CHF diet education provided (with  services used). Literature provided. Pt made aware RD remains available for additional information PRN. 3. Monitor PO intake/tolerance, weights, labs, hydration status, bowels, and skin integrity.

## 2020-02-19 NOTE — PROGRESS NOTE ADULT - PROBLEM SELECTOR PLAN 3
Baseline serum Cr 1.8-2.0, serum Cr rising to 2.3 this AM, with mild hyponatremia. In setting of more aggressive diuresis, and with likely contraction alkalosis, so suspect 2/2 overdiuresis.  -bladder scan previously this admission with PVR 20  -check urine lytes, FeUrea Baseline serum Cr 1.8-2.0, serum Cr rising to 2.3 this AM, with mild hyponatremia. In setting of more aggressive diuresis, and with likely contraction alkalosis, so suspect 2/2 overdiuresis.  -bladder scan previously this admission with PVR 20  -Serum Cr now improving

## 2020-02-19 NOTE — DIETITIAN INITIAL EVALUATION ADULT. - PROBLEM SELECTOR PLAN 1
hypoxic to 89% at outpt clinic, SOB today, b/l basilar crackles, significant peripheral edema on home bumex 1mg BID. ProBNP 1072. Trop neg. Now satting well ORA  -s/p 1mg Bumex in the ED, CXR clear, RVP neg  -start lasix IV 80 BID  -check pro bnp upon dc  -strict I&Os  -monitor on tele  -continue home lopressor 25, spironolactone 12.5 qd

## 2020-02-19 NOTE — DIETITIAN INITIAL EVALUATION ADULT. - PROBLEM SELECTOR PLAN 2
Increased erythema on RLE for the past 3 days, more tender than LLE. Duplex from 12/2019 neg for DVT. Cellulitis vs chronic venous stasis in the setting of CHF vs lymphedema  -monitor off antibiotics  -start lasix 80 IV BID

## 2020-02-19 NOTE — PROGRESS NOTE ADULT - PROBLEM SELECTOR PLAN 1
hypoxic to 89% at outpt clinic, SOB today, b/l basilar crackles, significant peripheral edema on home bumex 1mg BID. ProBNP 1072. Trop neg. Now satting well on NC2L. CXR clear, RVP neg.  -Patient still hypoxic - potentially atelectasis vs infection vs ongoing ADHF.  -Repeat CXR today with clear lungs. Encourage incentive spirometry.  -Continue with Lasix to 20mg PO BID  -Supplemental O2 PRN for SpO2 > 92% -- wean as tolerated  -check pro bnp upon dc  -strict I&Os  -monitor on tele  -continue home lopressor 25, spironolactone 12.5 qd  -Dr. Hagan contacted via email  -symptomatic support with saline spray and duoneb hypoxic to 89% at outpt clinic, SOB today, b/l basilar crackles, significant peripheral edema on home bumex 1mg BID. ProBNP 1072. Trop neg. Now satting well on NC2L. CXR clear, RVP neg.  -Patient's symptoms improving, O2 sat on ABG this AM 98%. Will check ambulatory saturation on room air, likely discharge today  -Repeat CXR yesterday with clear lungs. Encourage incentive spirometry.  -Continue with Lasix to 20mg PO BID  -Supplemental O2 PRN for SpO2 > 92% -- wean as tolerated  -check pro bnp upon dc  -strict I&Os  -monitor on tele  -continue home lopressor 25, spironolactone 12.5 qd  -Dr. Hagan contacted via email  -symptomatic support with saline spray and duoneb

## 2020-02-19 NOTE — DIETITIAN INITIAL EVALUATION ADULT. - PERTINENT MEDS FT
MEDICATIONS  (STANDING):  albuterol/ipratropium for Nebulization. 3 milliLiter(s) Nebulizer two times a day  apixaban 2.5 milliGRAM(s) Oral two times a day  atorvastatin 10 milliGRAM(s) Oral at bedtime  dextrose 5%. 1000 milliLiter(s) (50 mL/Hr) IV Continuous <Continuous>  dextrose 50% Injectable 12.5 Gram(s) IV Push once  dextrose 50% Injectable 25 Gram(s) IV Push once  dextrose 50% Injectable 25 Gram(s) IV Push once  fluticasone propionate 50 MICROgram(s)/spray Nasal Spray 1 Spray(s) Both Nostrils two times a day  furosemide    Tablet 20 milliGRAM(s) Oral two times a day  gabapentin 300 milliGRAM(s) Oral two times a day  hydrALAZINE 100 milliGRAM(s) Oral two times a day  insulin lispro (HumaLOG) corrective regimen sliding scale   SubCutaneous three times a day before meals  insulin lispro (HumaLOG) corrective regimen sliding scale   SubCutaneous at bedtime  isosorbide   mononitrate ER Tablet (IMDUR) 60 milliGRAM(s) Oral daily  metoprolol succinate ER 25 milliGRAM(s) Oral daily  oxyCODONE  ER Tablet 10 milliGRAM(s) Oral every 8 hours  polyethylene glycol 3350 17 Gram(s) Oral two times a day  senna 2 Tablet(s) Oral at bedtime  sodium chloride 0.65% Nasal 1 Spray(s) Both Nostrils two times a day  spironolactone 12.5 milliGRAM(s) Oral daily

## 2020-02-19 NOTE — PROGRESS NOTE ADULT - ASSESSMENT
79 y/o Maltese speaking F PMHx of HFpEF (EF 60-65%, stage II diastolic dysfunction), CAD (s/p NST showing areas of infarct, no prior caths), CKD (Stage III), Breast cancer (s/p R mastectomy in 1989, c/b chronic RUE lymphedema, rectal cancer (s/p reversed ileostomy in 2015), RLE DVT (2/2019, on Eliquis), chronic R hip pain, T2DM (A1C 6.7 in 12/2019), HTN, HLD, s/p knee replacement 2017, recent admission in December for RLE swelling and redness treated for CHF exacerbation presented to the ED with SOB and chest pain, admitted for likely CHF exacerbation now being diuresed.

## 2020-02-19 NOTE — PROGRESS NOTE ADULT - SUBJECTIVE AND OBJECTIVE BOX
Chely Zarate, PGY-1  Internal Medicine  Pager #: [phone] / [facility] [phone]    PROGRESS NOTE:     Patient is a 80y old  Female who presents with a chief complaint of SOB (18 Feb 2020 07:06)      SUBJECTIVE / OVERNIGHT EVENTS:  -No acute overnight events.      ADDITIONAL REVIEW OF SYSTEMS:    MEDICATIONS  (STANDING):  albuterol/ipratropium for Nebulization. 3 milliLiter(s) Nebulizer two times a day  apixaban 2.5 milliGRAM(s) Oral two times a day  atorvastatin 10 milliGRAM(s) Oral at bedtime  dextrose 5%. 1000 milliLiter(s) (50 mL/Hr) IV Continuous <Continuous>  dextrose 50% Injectable 12.5 Gram(s) IV Push once  dextrose 50% Injectable 25 Gram(s) IV Push once  dextrose 50% Injectable 25 Gram(s) IV Push once  fluticasone propionate 50 MICROgram(s)/spray Nasal Spray 1 Spray(s) Both Nostrils two times a day  furosemide    Tablet 20 milliGRAM(s) Oral two times a day  gabapentin 300 milliGRAM(s) Oral two times a day  hydrALAZINE 100 milliGRAM(s) Oral two times a day  insulin lispro (HumaLOG) corrective regimen sliding scale   SubCutaneous three times a day before meals  insulin lispro (HumaLOG) corrective regimen sliding scale   SubCutaneous at bedtime  isosorbide   mononitrate ER Tablet (IMDUR) 60 milliGRAM(s) Oral daily  metoprolol succinate ER 25 milliGRAM(s) Oral daily  oxyCODONE  ER Tablet 10 milliGRAM(s) Oral every 8 hours  polyethylene glycol 3350 17 Gram(s) Oral two times a day  senna 2 Tablet(s) Oral at bedtime  sodium chloride 0.65% Nasal 1 Spray(s) Both Nostrils two times a day  spironolactone 12.5 milliGRAM(s) Oral daily    MEDICATIONS  (PRN):  acetaminophen   Tablet .. 975 milliGRAM(s) Oral every 6 hours PRN Moderate Pain (4 - 6)  bisacodyl 5 milliGRAM(s) Oral every 12 hours PRN Constipation  dextrose 40% Gel 15 Gram(s) Oral once PRN Blood Glucose LESS THAN 70 milliGRAM(s)/deciliter  glucagon  Injectable 1 milliGRAM(s) IntraMuscular once PRN Glucose LESS THAN 70 milligrams/deciliter      CAPILLARY BLOOD GLUCOSE      POCT Blood Glucose.: 155 mg/dL (18 Feb 2020 21:34)  POCT Blood Glucose.: 208 mg/dL (18 Feb 2020 17:20)  POCT Blood Glucose.: 257 mg/dL (18 Feb 2020 11:51)  POCT Blood Glucose.: 191 mg/dL (18 Feb 2020 08:20)    I&O's Summary    18 Feb 2020 07:01  -  19 Feb 2020 07:00  --------------------------------------------------------  IN: 360 mL / OUT: 1500 mL / NET: -1140 mL        PHYSICAL EXAM:  Vital Signs Last 24 Hrs  T(C): 37.1 (19 Feb 2020 05:29), Max: 37.1 (19 Feb 2020 05:29)  T(F): 98.7 (19 Feb 2020 05:29), Max: 98.7 (19 Feb 2020 05:29)  HR: 75 (19 Feb 2020 05:29) (73 - 75)  BP: 149/65 (19 Feb 2020 05:29) (113/62 - 149/65)  BP(mean): --  RR: 18 (19 Feb 2020 05:29) (18 - 18)  SpO2: 99% (19 Feb 2020 05:29) (97% - 99%)    General: No acute distress on ***  HEENT: NCAT.  PERRL.  EOMI.  No scleral icterus or injection.    Neck: Supple.  Full ROM.  No JVD.    Heart: RRR.  Normal S1 and S2.  No murmurs, rubs, or gallops.   Lungs: +crackles at the bases bilaterally   Abdomen: BS+, soft, NT/ND.   Skin: redness of b/l lower extremities  Extremities: trace pitting edema in b/l lower ext, non-pitting edema and erythema of left upper ext  Musculoskeletal: No deformities.    Neuro: No neuro focal deficits    LABS:    02-19    135  |  91<L>  |  47<H>  ----------------------------<  172<H>  4.2   |  29  |  2.05<H>    Ca    9.5      19 Feb 2020 05:58  Phos  3.8     02-19  Mg     2.4     02-19                  RADIOLOGY & ADDITIONAL TESTS:  Results Reviewed:   Imaging Personally Reviewed:  Electrocardiogram Personally Reviewed:    COORDINATION OF CARE:  Care Discussed with Consultants/Other Providers [Y/N]:  Prior or Outpatient Records Reviewed [Y/N]: Chely Zarate, PGY-1  Internal Medicine  Pager #: [phone] / [facility] [phone]    PROGRESS NOTE:     Patient is a 80y old  Female who presents with a chief complaint of SOB (18 Feb 2020 07:06)      SUBJECTIVE / OVERNIGHT EVENTS:  -No acute overnight events.    Patient is Croatian-speaking, [employer] ID #[id number] used. Patient reports cough has improved, denies SOB. Patient using incentive spirometry. Denies chest pain. Lower extremity swelling improved. Had BM yesterday. No problems with urination.    ADDITIONAL REVIEW OF SYSTEMS:  Denies fever/chills, chest pain, SOB, abd pain, N/V/D, constipation, extremity pain/swelling.    MEDICATIONS  (STANDING):  albuterol/ipratropium for Nebulization. 3 milliLiter(s) Nebulizer two times a day  apixaban 2.5 milliGRAM(s) Oral two times a day  atorvastatin 10 milliGRAM(s) Oral at bedtime  dextrose 5%. 1000 milliLiter(s) (50 mL/Hr) IV Continuous <Continuous>  dextrose 50% Injectable 12.5 Gram(s) IV Push once  dextrose 50% Injectable 25 Gram(s) IV Push once  dextrose 50% Injectable 25 Gram(s) IV Push once  fluticasone propionate 50 MICROgram(s)/spray Nasal Spray 1 Spray(s) Both Nostrils two times a day  furosemide    Tablet 20 milliGRAM(s) Oral two times a day  gabapentin 300 milliGRAM(s) Oral two times a day  hydrALAZINE 100 milliGRAM(s) Oral two times a day  insulin lispro (HumaLOG) corrective regimen sliding scale   SubCutaneous three times a day before meals  insulin lispro (HumaLOG) corrective regimen sliding scale   SubCutaneous at bedtime  isosorbide   mononitrate ER Tablet (IMDUR) 60 milliGRAM(s) Oral daily  metoprolol succinate ER 25 milliGRAM(s) Oral daily  oxyCODONE  ER Tablet 10 milliGRAM(s) Oral every 8 hours  polyethylene glycol 3350 17 Gram(s) Oral two times a day  senna 2 Tablet(s) Oral at bedtime  sodium chloride 0.65% Nasal 1 Spray(s) Both Nostrils two times a day  spironolactone 12.5 milliGRAM(s) Oral daily    MEDICATIONS  (PRN):  acetaminophen   Tablet .. 975 milliGRAM(s) Oral every 6 hours PRN Moderate Pain (4 - 6)  bisacodyl 5 milliGRAM(s) Oral every 12 hours PRN Constipation  dextrose 40% Gel 15 Gram(s) Oral once PRN Blood Glucose LESS THAN 70 milliGRAM(s)/deciliter  glucagon  Injectable 1 milliGRAM(s) IntraMuscular once PRN Glucose LESS THAN 70 milligrams/deciliter      CAPILLARY BLOOD GLUCOSE      POCT Blood Glucose.: 155 mg/dL (18 Feb 2020 21:34)  POCT Blood Glucose.: 208 mg/dL (18 Feb 2020 17:20)  POCT Blood Glucose.: 257 mg/dL (18 Feb 2020 11:51)  POCT Blood Glucose.: 191 mg/dL (18 Feb 2020 08:20)    I&O's Summary    18 Feb 2020 07:01  -  19 Feb 2020 07:00  --------------------------------------------------------  IN: 360 mL / OUT: 1500 mL / NET: -1140 mL        PHYSICAL EXAM:  Vital Signs Last 24 Hrs  T(C): 37.1 (19 Feb 2020 05:29), Max: 37.1 (19 Feb 2020 05:29)  T(F): 98.7 (19 Feb 2020 05:29), Max: 98.7 (19 Feb 2020 05:29)  HR: 75 (19 Feb 2020 05:29) (73 - 75)  BP: 149/65 (19 Feb 2020 05:29) (113/62 - 149/65)  BP(mean): --  RR: 18 (19 Feb 2020 05:29) (18 - 18)  SpO2: 99% (19 Feb 2020 05:29) (97% - 99%)    General: No acute distress, NC at 1L but not on correctly  HEENT: NCAT.  PERRL.  EOMI.  No scleral icterus or injection.    Neck: Supple.  Full ROM.  No JVD.    Heart: RRR.  Normal S1 and S2.  No murmurs, rubs, or gallops.   Lungs: +soft crackles at the bases bilaterally   Abdomen: BS+, soft, NT/ND.   Skin: redness of b/l lower extremities  Extremities: no pitting edema in b/l lower ext, non-pitting edema and erythema of left upper ext  Musculoskeletal: No deformities.    Neuro: No neuro focal deficits    LABS:    02-19    135  |  91<L>  |  47<H>  ----------------------------<  172<H>  4.2   |  29  |  2.05<H>    Ca    9.5      19 Feb 2020 05:58  Phos  3.8     02-19  Mg     2.4     02-19      RADIOLOGY & ADDITIONAL TESTS:  Results Reviewed:   Imaging Personally Reviewed:  Electrocardiogram Personally Reviewed:    COORDINATION OF CARE:  Care Discussed with Consultants/Other Providers [Y/N]:  Prior or Outpatient Records Reviewed [Y/N]:

## 2020-02-19 NOTE — DIETITIAN INITIAL EVALUATION ADULT. - OTHER INFO
79 y/o Wolof speaking F PMHx of HFpEF (EF 60-65%, stage II diastolic dysfunction), CAD, CKD (Stage III), Breast cancer (s/p R mastectomy in 1989, c/b chronic RUE lymphedema, rectal cancer (s/p reversed ileostomy in 2015), RLE DVT (2/2019, on Eliquis), chronic R hip pain, T2DM, HTN, HLD, s/p knee replacement 2017, recent admission in December for RLE swelling and redness treated for CHF exacerbation presented to the ED with SOB and chest pain, admitted for likely CHF exacerbation now being diuresed.    Pt Wolof speaking, [employer] used. Limited information obtained, pt became aggravated in  phone and, therefore, grew increasingly disinterested in RD assessment/education. Pt notes she is eating well in-house, consuming ~% at meals; notes she usually eats approximately the same amount at home, never had huge appetite. Pt admits to following low sodium diet at home, however after discussing nutrition recommendations appears pt frequently adds salt when cooking; notes high consumption of vegetables. Admits to some weight fluctuations recently 2/2 fluid; notes UBW 84 kg (185 pounds), states weight gain PTA getting up to 89 kg (199 pounds); standing scale weight 2/14 noted 204.3 pounds, 2/16 - 199.2 pounds (standing), 2/17 - 198.1 pounds, 2/18 - 147.9 pounds ; standing scale weight today, 2/19, notes 147.7 pounds; ? accuracy of some weights - pt with edema and being diuresed. Per prior RD note from May 2015 pt weight was 159 pounds. Nutrition focused physical exam deferred at this time, pt became aggravated with  phone and then became dismissive of RD; no visual signs of muscle wasting/fat loss observed, pt appears well developed for age. Denies N+V, diarrhea/constipation - last BM 2/18, on bowel regimen; no difficulties chewing/swallowing. NKFA. Admits to Vitamin D supplementation PTA.    Pt c Hx of T2DM, A1c 6.8% (2/14) reveals relatively optimal glycemic control PTA for someone of advanced age; FS and glucose elevated in-house. Pt controls BG at home with Januvia and Glimepiride. Per H&P monitors BG before meals and prior to bedtime. Pt also with CHF, reviewed CHF diet education. Discussed Na restriction, foods high in Na to avoid, reading food labels, tips for limiting Na in your diet. Reviewed daily weights, Wt gain parameters to contact MD. Discussed Na intake in relation to fluid retention, edema and Wt gain. Pt accepted CHF STAR handouts and noted her son and daughter will help her translate written materials.

## 2020-02-19 NOTE — DIETITIAN INITIAL EVALUATION ADULT. - PROBLEM SELECTOR PLAN 10
-DVT: on eliquis  -Diet: consistent carb/DASH  -Dispo: PT eval    Discharge planning  Transitions of Care Status:  1.  Name of PCP:  2.  PCP Contacted on Admission: [ ] Y    [ ] N    3.  PCP contacted at Discharge: [ ] Y    [ ] N    [ ] N/A  4.  Post-Discharge Appointment Date and Location:  5.  Summary of Handoff given to PCP:

## 2020-02-19 NOTE — DIETITIAN INITIAL EVALUATION ADULT. - PROBLEM SELECTOR PLAN 5
A1C 6.7 in 12/2019 on home glimperide and januvia  -IIS while inpatient    CKD  baseline Cr 1.5s-1.8s, now close to baseline  -monitor BMP    Chronic hip pain  on home oxycontin 10 and gralise 600qd  -given GFR, will continue oxycodone 10 and gabapentin 300qhs for now

## 2020-02-20 ENCOUNTER — TRANSCRIPTION ENCOUNTER (OUTPATIENT)
Age: 80
End: 2020-02-20

## 2020-02-20 DIAGNOSIS — M25.531 PAIN IN RIGHT WRIST: ICD-10-CM

## 2020-02-20 LAB
GLUCOSE BLDC GLUCOMTR-MCNC: 180 MG/DL — HIGH (ref 70–99)
GLUCOSE BLDC GLUCOMTR-MCNC: 201 MG/DL — HIGH (ref 70–99)
GLUCOSE BLDC GLUCOMTR-MCNC: 214 MG/DL — HIGH (ref 70–99)
GLUCOSE BLDC GLUCOMTR-MCNC: 257 MG/DL — HIGH (ref 70–99)

## 2020-02-20 PROCEDURE — 99232 SBSQ HOSP IP/OBS MODERATE 35: CPT | Mod: GC

## 2020-02-20 PROCEDURE — 73110 X-RAY EXAM OF WRIST: CPT | Mod: 26,RT

## 2020-02-20 RX ORDER — COLCHICINE 0.6 MG
0.3 TABLET ORAL ONCE
Refills: 0 | Status: COMPLETED | OUTPATIENT
Start: 2020-02-20 | End: 2020-02-20

## 2020-02-20 RX ORDER — ACETAMINOPHEN 500 MG
975 TABLET ORAL EVERY 8 HOURS
Refills: 0 | Status: COMPLETED | OUTPATIENT
Start: 2020-02-20 | End: 2020-02-22

## 2020-02-20 RX ORDER — COLCHICINE 0.6 MG
0.3 TABLET ORAL DAILY
Refills: 0 | Status: DISCONTINUED | OUTPATIENT
Start: 2020-02-20 | End: 2020-02-23

## 2020-02-20 RX ORDER — LIDOCAINE 4 G/100G
1 CREAM TOPICAL DAILY
Refills: 0 | Status: DISCONTINUED | OUTPATIENT
Start: 2020-02-20 | End: 2020-02-23

## 2020-02-20 RX ADMIN — OXYCODONE HYDROCHLORIDE 10 MILLIGRAM(S): 5 TABLET ORAL at 08:56

## 2020-02-20 RX ADMIN — Medication 1: at 17:48

## 2020-02-20 RX ADMIN — Medication 1 SPRAY(S): at 05:34

## 2020-02-20 RX ADMIN — Medication 975 MILLIGRAM(S): at 05:30

## 2020-02-20 RX ADMIN — Medication 975 MILLIGRAM(S): at 05:05

## 2020-02-20 RX ADMIN — Medication 20 MILLIGRAM(S): at 17:48

## 2020-02-20 RX ADMIN — Medication 1: at 21:23

## 2020-02-20 RX ADMIN — Medication 975 MILLIGRAM(S): at 17:47

## 2020-02-20 RX ADMIN — SENNA PLUS 2 TABLET(S): 8.6 TABLET ORAL at 21:23

## 2020-02-20 RX ADMIN — Medication 975 MILLIGRAM(S): at 19:17

## 2020-02-20 RX ADMIN — OXYCODONE HYDROCHLORIDE 10 MILLIGRAM(S): 5 TABLET ORAL at 19:17

## 2020-02-20 RX ADMIN — OXYCODONE HYDROCHLORIDE 10 MILLIGRAM(S): 5 TABLET ORAL at 09:26

## 2020-02-20 RX ADMIN — Medication 2: at 08:48

## 2020-02-20 RX ADMIN — Medication 3 MILLILITER(S): at 17:48

## 2020-02-20 RX ADMIN — GABAPENTIN 300 MILLIGRAM(S): 400 CAPSULE ORAL at 17:47

## 2020-02-20 RX ADMIN — APIXABAN 2.5 MILLIGRAM(S): 2.5 TABLET, FILM COATED ORAL at 17:48

## 2020-02-20 RX ADMIN — Medication 3 MILLILITER(S): at 05:01

## 2020-02-20 RX ADMIN — LIDOCAINE 1 PATCH: 4 CREAM TOPICAL at 21:29

## 2020-02-20 RX ADMIN — ISOSORBIDE MONONITRATE 60 MILLIGRAM(S): 60 TABLET, EXTENDED RELEASE ORAL at 11:38

## 2020-02-20 RX ADMIN — GABAPENTIN 300 MILLIGRAM(S): 400 CAPSULE ORAL at 05:02

## 2020-02-20 RX ADMIN — Medication 25 MILLIGRAM(S): at 05:03

## 2020-02-20 RX ADMIN — Medication 20 MILLIGRAM(S): at 05:02

## 2020-02-20 RX ADMIN — Medication 100 MILLIGRAM(S): at 05:02

## 2020-02-20 RX ADMIN — SPIRONOLACTONE 12.5 MILLIGRAM(S): 25 TABLET, FILM COATED ORAL at 05:01

## 2020-02-20 RX ADMIN — ATORVASTATIN CALCIUM 10 MILLIGRAM(S): 80 TABLET, FILM COATED ORAL at 21:23

## 2020-02-20 RX ADMIN — Medication 1 SPRAY(S): at 05:00

## 2020-02-20 RX ADMIN — OXYCODONE HYDROCHLORIDE 10 MILLIGRAM(S): 5 TABLET ORAL at 17:46

## 2020-02-20 RX ADMIN — Medication 2: at 11:53

## 2020-02-20 RX ADMIN — Medication 100 MILLIGRAM(S): at 17:49

## 2020-02-20 RX ADMIN — Medication 0.3 MILLIGRAM(S): at 11:37

## 2020-02-20 RX ADMIN — Medication 975 MILLIGRAM(S): at 11:40

## 2020-02-20 RX ADMIN — APIXABAN 2.5 MILLIGRAM(S): 2.5 TABLET, FILM COATED ORAL at 05:03

## 2020-02-20 RX ADMIN — Medication 975 MILLIGRAM(S): at 12:10

## 2020-02-20 RX ADMIN — Medication 1 SPRAY(S): at 17:47

## 2020-02-20 NOTE — DISCHARGE NOTE NURSING/CASE MANAGEMENT/SOCIAL WORK - NSDCPEPT PROEDHF_GEN_ALL_CORE
Call primary care provider for follow up after discharge/Low salt diet/Report signs and symptoms to primary care provider/Monitor weight daily/Activities as tolerated

## 2020-02-20 NOTE — CHART NOTE - NSCHARTNOTEFT_GEN_A_CORE
Patient weaned off NC yesterday, saturating 97% on room air at rest. Overnight patient saturating 92% on room air. Patient medically stable for discharge this morning when daughter arrives for transport.    Chely Zarate, PGY-1  Internal Medicine  Pager #: [phone] / [facility] [phone]

## 2020-02-20 NOTE — PROGRESS NOTE ADULT - PROBLEM SELECTOR PLAN 2
Suspect gout, given sudden onset in setting of increased diuresis. DDx includes septic joint however less likely as patient otherwise non-toxic appearing, no effusion felt.  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 DDx includes septic joint however less likely as patient otherwise non-toxic appearing, no effusion felt.  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 DDx includes septic joint however less likely as patient otherwise non-toxic appearing, no effusion felt.  -Right wrist x-rays ordered  -Colchicine 0.3mg daily  -Lidocaine patch to wrist  -Standing tylenol

## 2020-02-20 NOTE — PROGRESS NOTE ADULT - SUBJECTIVE AND OBJECTIVE BOX
Chely Zarate, PGY-1  Internal Medicine  Pager #: [phone] / [facility] [phone]    PROGRESS NOTE:     Patient is a 80y old  Female who presents with a chief complaint of SOB (19 Feb 2020 06:59)      SUBJECTIVE / OVERNIGHT EVENTS:  -No acute overnight events.    Patient had been preparing for discharge when experienced sudden onset right wrist pain, worsened with movement. No fever/chills. No pain in other joints.    ADDITIONAL REVIEW OF SYSTEMS:  No chest pain, SOB, abd pain, n/v/d, constipation.    MEDICATIONS  (STANDING):  albuterol/ipratropium for Nebulization. 3 milliLiter(s) Nebulizer two times a day  apixaban 2.5 milliGRAM(s) Oral two times a day  atorvastatin 10 milliGRAM(s) Oral at bedtime  dextrose 5%. 1000 milliLiter(s) (50 mL/Hr) IV Continuous <Continuous>  dextrose 50% Injectable 12.5 Gram(s) IV Push once  dextrose 50% Injectable 25 Gram(s) IV Push once  dextrose 50% Injectable 25 Gram(s) IV Push once  fluticasone propionate 50 MICROgram(s)/spray Nasal Spray 1 Spray(s) Both Nostrils two times a day  furosemide    Tablet 20 milliGRAM(s) Oral two times a day  gabapentin 300 milliGRAM(s) Oral two times a day  hydrALAZINE 100 milliGRAM(s) Oral two times a day  insulin lispro (HumaLOG) corrective regimen sliding scale   SubCutaneous three times a day before meals  insulin lispro (HumaLOG) corrective regimen sliding scale   SubCutaneous at bedtime  isosorbide   mononitrate ER Tablet (IMDUR) 60 milliGRAM(s) Oral daily  metoprolol succinate ER 25 milliGRAM(s) Oral daily  oxyCODONE  ER Tablet 10 milliGRAM(s) Oral every 8 hours  polyethylene glycol 3350 17 Gram(s) Oral two times a day  senna 2 Tablet(s) Oral at bedtime  sodium chloride 0.65% Nasal 1 Spray(s) Both Nostrils two times a day  spironolactone 12.5 milliGRAM(s) Oral daily    MEDICATIONS  (PRN):  acetaminophen   Tablet .. 975 milliGRAM(s) Oral every 6 hours PRN Moderate Pain (4 - 6)  bisacodyl 5 milliGRAM(s) Oral every 12 hours PRN Constipation  dextrose 40% Gel 15 Gram(s) Oral once PRN Blood Glucose LESS THAN 70 milliGRAM(s)/deciliter  glucagon  Injectable 1 milliGRAM(s) IntraMuscular once PRN Glucose LESS THAN 70 milligrams/deciliter      CAPILLARY BLOOD GLUCOSE      POCT Blood Glucose.: 214 mg/dL (20 Feb 2020 11:47)  POCT Blood Glucose.: 201 mg/dL (20 Feb 2020 07:49)  POCT Blood Glucose.: 209 mg/dL (19 Feb 2020 21:38)  POCT Blood Glucose.: 217 mg/dL (19 Feb 2020 17:18)    I&O's Summary    19 Feb 2020 07:01  -  20 Feb 2020 07:00  --------------------------------------------------------  IN: 1311 mL / OUT: 300 mL / NET: 1011 mL    20 Feb 2020 07:01  -  20 Feb 2020 13:18  --------------------------------------------------------  IN: 240 mL / OUT: 750 mL / NET: -510 mL        PHYSICAL EXAM:  Vital Signs Last 24 Hrs  T(C): 36.9 (20 Feb 2020 12:08), Max: 36.9 (20 Feb 2020 04:58)  T(F): 98.5 (20 Feb 2020 12:08), Max: 98.5 (20 Feb 2020 04:58)  HR: 79 (20 Feb 2020 12:08) (77 - 87)  BP: 139/69 (20 Feb 2020 12:08) (95/72 - 139/70)  BP(mean): --  RR: 18 (20 Feb 2020 12:08) (18 - 18)  SpO2: 95% (20 Feb 2020 12:08) (92% - 95%)    CONSTITUTIONAL: NAD, well-developed  RESPIRATORY: Normal respiratory effort; lungs are clear to auscultation bilaterally  CARDIOVASCULAR: Regular rate and rhythm, normal S1 and S2, no murmur/rub/gallop; No lower extremity edema; Peripheral pulses are 2+ bilaterally  ABDOMEN: Nontender to palpation, normoactive bowel sounds, no rebound/guarding; No hepatosplenomegaly  MUSCLOSKELETAL: no clubbing or cyanosis of digits; no joint swelling or tenderness to palpation  PSYCH: A+O to person, place, and time; affect appropriate    LABS:    02-19    135  |  91<L>  |  47<H>  ----------------------------<  172<H>  4.2   |  29  |  2.05<H>    Ca    9.5      19 Feb 2020 05:58  Phos  3.8     02-19  Mg     2.4     02-19                  RADIOLOGY & ADDITIONAL TESTS:  Results Reviewed:   Imaging Personally Reviewed:  Electrocardiogram Personally Reviewed:    COORDINATION OF CARE:  Care Discussed with Consultants/Other Providers [Y/N]:  Prior or Outpatient Records Reviewed [Y/N]: Chely Zarate, PGY-1  Internal Medicine  Pager #: [phone] / [facility] [phone]    PROGRESS NOTE:     Patient is a 80y old  Female who presents with a chief complaint of SOB (19 Feb 2020 06:59)      SUBJECTIVE / OVERNIGHT EVENTS:  -No acute overnight events.    Patient is Maori-speaking, daughter at bedside to provide translation. On later reassessment, [employer]  services (ID#[id number]) used.  Patient had been preparing for discharge when experienced sudden onset right wrist pain, worsened with movement. No fever/chills. No pain in other joints.    ADDITIONAL REVIEW OF SYSTEMS:  No chest pain, SOB, abd pain, n/v/d, constipation.    MEDICATIONS  (STANDING):  albuterol/ipratropium for Nebulization. 3 milliLiter(s) Nebulizer two times a day  apixaban 2.5 milliGRAM(s) Oral two times a day  atorvastatin 10 milliGRAM(s) Oral at bedtime  dextrose 5%. 1000 milliLiter(s) (50 mL/Hr) IV Continuous <Continuous>  dextrose 50% Injectable 12.5 Gram(s) IV Push once  dextrose 50% Injectable 25 Gram(s) IV Push once  dextrose 50% Injectable 25 Gram(s) IV Push once  fluticasone propionate 50 MICROgram(s)/spray Nasal Spray 1 Spray(s) Both Nostrils two times a day  furosemide    Tablet 20 milliGRAM(s) Oral two times a day  gabapentin 300 milliGRAM(s) Oral two times a day  hydrALAZINE 100 milliGRAM(s) Oral two times a day  insulin lispro (HumaLOG) corrective regimen sliding scale   SubCutaneous three times a day before meals  insulin lispro (HumaLOG) corrective regimen sliding scale   SubCutaneous at bedtime  isosorbide   mononitrate ER Tablet (IMDUR) 60 milliGRAM(s) Oral daily  metoprolol succinate ER 25 milliGRAM(s) Oral daily  oxyCODONE  ER Tablet 10 milliGRAM(s) Oral every 8 hours  polyethylene glycol 3350 17 Gram(s) Oral two times a day  senna 2 Tablet(s) Oral at bedtime  sodium chloride 0.65% Nasal 1 Spray(s) Both Nostrils two times a day  spironolactone 12.5 milliGRAM(s) Oral daily    MEDICATIONS  (PRN):  acetaminophen   Tablet .. 975 milliGRAM(s) Oral every 6 hours PRN Moderate Pain (4 - 6)  bisacodyl 5 milliGRAM(s) Oral every 12 hours PRN Constipation  dextrose 40% Gel 15 Gram(s) Oral once PRN Blood Glucose LESS THAN 70 milliGRAM(s)/deciliter  glucagon  Injectable 1 milliGRAM(s) IntraMuscular once PRN Glucose LESS THAN 70 milligrams/deciliter      CAPILLARY BLOOD GLUCOSE      POCT Blood Glucose.: 214 mg/dL (20 Feb 2020 11:47)  POCT Blood Glucose.: 201 mg/dL (20 Feb 2020 07:49)  POCT Blood Glucose.: 209 mg/dL (19 Feb 2020 21:38)  POCT Blood Glucose.: 217 mg/dL (19 Feb 2020 17:18)    I&O's Summary    19 Feb 2020 07:01  -  20 Feb 2020 07:00  --------------------------------------------------------  IN: 1311 mL / OUT: 300 mL / NET: 1011 mL    20 Feb 2020 07:01  -  20 Feb 2020 13:18  --------------------------------------------------------  IN: 240 mL / OUT: 750 mL / NET: -510 mL        PHYSICAL EXAM:  Vital Signs Last 24 Hrs  T(C): 36.9 (20 Feb 2020 12:08), Max: 36.9 (20 Feb 2020 04:58)  T(F): 98.5 (20 Feb 2020 12:08), Max: 98.5 (20 Feb 2020 04:58)  HR: 79 (20 Feb 2020 12:08) (77 - 87)  BP: 139/69 (20 Feb 2020 12:08) (95/72 - 139/70)  BP(mean): --  RR: 18 (20 Feb 2020 12:08) (18 - 18)  SpO2: 95% (20 Feb 2020 12:08) (92% - 95%)    General: No respiratory distress on room air, however appears very uncomfortable and teary  HEENT: NCAT.  PERRL.  EOMI.  No scleral icterus or injection.    Neck: Supple.  Full ROM.  No JVD.    Heart: RRR.  Normal S1 and S2.  No murmurs, rubs, or gallops.   Lungs: +soft crackles at the bases bilaterally   Abdomen: BS+, soft, NT/ND.   Skin: redness of b/l lower extremities  Extremities: no pitting edema in b/l lower ext, non-pitting edema and erythema of right upper extremity that appears similar to baseline however with tenderness to palpation at the wrist and limited ROM from pain  Musculoskeletal: No deformities.    Neuro: No neuro focal deficits    LABS:    02-19    135  |  91<L>  |  47<H>  ----------------------------<  172<H>  4.2   |  29  |  2.05<H>    Ca    9.5      19 Feb 2020 05:58  Phos  3.8     02-19  Mg     2.4     02-19                  RADIOLOGY & ADDITIONAL TESTS:  Results Reviewed:   Imaging Personally Reviewed:  Electrocardiogram Personally Reviewed:    COORDINATION OF CARE:  Care Discussed with Consultants/Other Providers [Y/N]:  Prior or Outpatient Records Reviewed [Y/N]:

## 2020-02-20 NOTE — PROGRESS NOTE ADULT - PROBLEM SELECTOR PLAN 4
Baseline serum Cr 1.8-2.0, serum Cr rising to 2.3 this AM, with mild hyponatremia. In setting of more aggressive diuresis, and with likely contraction alkalosis, so suspect 2/2 overdiuresis.  -bladder scan previously this admission with PVR 20  -Serum Cr now improving

## 2020-02-20 NOTE — PROGRESS NOTE ADULT - ASSESSMENT
79 y/o Kinyarwanda speaking F PMHx of HFpEF (EF 60-65%, stage II diastolic dysfunction), CAD (s/p NST showing areas of infarct, no prior caths), CKD (Stage III), Breast cancer (s/p R mastectomy in 1989, c/b chronic RUE lymphedema, rectal cancer (s/p reversed ileostomy in 2015), RLE DVT (2/2019, on Eliquis), chronic R hip pain, T2DM (A1C 6.7 in 12/2019), HTN, HLD, s/p knee replacement 2017, recent admission in December for RLE swelling and redness treated for CHF exacerbation presented to the ED with SOB and chest pain, admitted for likely CHF exacerbation now being diuresed.

## 2020-02-20 NOTE — DISCHARGE NOTE NURSING/CASE MANAGEMENT/SOCIAL WORK - PATIENT PORTAL LINK FT
You can access the FollowMyHealth Patient Portal offered by [facility] by registering at the following website: [email or web address]. By joining [employer]’s FollowMyHealth portal, you will also be able to view your health information using other applications (apps) compatible with our system.

## 2020-02-20 NOTE — PROGRESS NOTE ADULT - PROBLEM SELECTOR PLAN 1
hypoxic to 89% at outpt clinic, SOB today, b/l basilar crackles, significant peripheral edema on home bumex 1mg BID. ProBNP 1072. Trop neg. Now satting well on NC2L. CXR clear, RVP neg.  -Patient's symptoms improving, O2 sat on ABG this AM 98%. Will check ambulatory saturation on room air, likely discharge today  -Repeat CXR yesterday with clear lungs. Encourage incentive spirometry.  -Continue with Lasix to 20mg PO BID  -Supplemental O2 PRN for SpO2 > 92% -- wean as tolerated  -check pro bnp upon dc  -strict I&Os  -monitor on tele  -continue home lopressor 25, spironolactone 12.5 qd  -Dr. Hagan contacted via email  -symptomatic support with saline spray and duoneb hypoxic to 89% at outpt clinic, SOB today, b/l basilar crackles, significant peripheral edema on home bumex 1mg BID. ProBNP 1072. Trop neg. CXR clear, RVP neg.  -Now weaned to room air  -Repeat CXR with clear lungs. Encourage incentive spirometry.  -Continue with Lasix to 20mg PO BID  -strict I&Os  -monitor on tele  -continue home lopressor 25, spironolactone 12.5 qd  -Dr. Hagan contacted via email  -symptomatic support with saline spray and duoneb

## 2020-02-21 LAB
ANION GAP SERPL CALC-SCNC: 16 MMOL/L — SIGNIFICANT CHANGE UP (ref 5–17)
BASOPHILS # BLD AUTO: 0.03 K/UL — SIGNIFICANT CHANGE UP (ref 0–0.2)
BASOPHILS NFR BLD AUTO: 0.4 % — SIGNIFICANT CHANGE UP (ref 0–2)
BUN SERPL-MCNC: 52 MG/DL — HIGH (ref 7–23)
CALCIUM SERPL-MCNC: 9.5 MG/DL — SIGNIFICANT CHANGE UP (ref 8.4–10.5)
CHLORIDE SERPL-SCNC: 92 MMOL/L — LOW (ref 96–108)
CO2 SERPL-SCNC: 28 MMOL/L — SIGNIFICANT CHANGE UP (ref 22–31)
CREAT SERPL-MCNC: 1.9 MG/DL — HIGH (ref 0.5–1.3)
EOSINOPHIL # BLD AUTO: 0.05 K/UL — SIGNIFICANT CHANGE UP (ref 0–0.5)
EOSINOPHIL NFR BLD AUTO: 0.6 % — SIGNIFICANT CHANGE UP (ref 0–6)
GLUCOSE BLDC GLUCOMTR-MCNC: 175 MG/DL — HIGH (ref 70–99)
GLUCOSE BLDC GLUCOMTR-MCNC: 179 MG/DL — HIGH (ref 70–99)
GLUCOSE BLDC GLUCOMTR-MCNC: 191 MG/DL — HIGH (ref 70–99)
GLUCOSE BLDC GLUCOMTR-MCNC: 278 MG/DL — HIGH (ref 70–99)
GLUCOSE BLDC GLUCOMTR-MCNC: 302 MG/DL — HIGH (ref 70–99)
GLUCOSE SERPL-MCNC: 173 MG/DL — HIGH (ref 70–99)
HCT VFR BLD CALC: 37.7 % — SIGNIFICANT CHANGE UP (ref 34.5–45)
HGB BLD-MCNC: 11.3 G/DL — LOW (ref 11.5–15.5)
IMM GRANULOCYTES NFR BLD AUTO: 0.1 % — SIGNIFICANT CHANGE UP (ref 0–1.5)
LYMPHOCYTES # BLD AUTO: 1.4 K/UL — SIGNIFICANT CHANGE UP (ref 1–3.3)
LYMPHOCYTES # BLD AUTO: 18.1 % — SIGNIFICANT CHANGE UP (ref 13–44)
MAGNESIUM SERPL-MCNC: 2.4 MG/DL — SIGNIFICANT CHANGE UP (ref 1.6–2.6)
MCHC RBC-ENTMCNC: 29.2 PG — SIGNIFICANT CHANGE UP (ref 27–34)
MCHC RBC-ENTMCNC: 30 GM/DL — LOW (ref 32–36)
MCV RBC AUTO: 97.4 FL — SIGNIFICANT CHANGE UP (ref 80–100)
MONOCYTES # BLD AUTO: 0.82 K/UL — SIGNIFICANT CHANGE UP (ref 0–0.9)
MONOCYTES NFR BLD AUTO: 10.6 % — SIGNIFICANT CHANGE UP (ref 2–14)
NEUTROPHILS # BLD AUTO: 5.44 K/UL — SIGNIFICANT CHANGE UP (ref 1.8–7.4)
NEUTROPHILS NFR BLD AUTO: 70.2 % — SIGNIFICANT CHANGE UP (ref 43–77)
NRBC # BLD: 0 /100 WBCS — SIGNIFICANT CHANGE UP (ref 0–0)
PHOSPHATE SERPL-MCNC: 3.7 MG/DL — SIGNIFICANT CHANGE UP (ref 2.5–4.5)
PLATELET # BLD AUTO: 250 K/UL — SIGNIFICANT CHANGE UP (ref 150–400)
POTASSIUM SERPL-MCNC: 4.3 MMOL/L — SIGNIFICANT CHANGE UP (ref 3.5–5.3)
POTASSIUM SERPL-SCNC: 4.3 MMOL/L — SIGNIFICANT CHANGE UP (ref 3.5–5.3)
RBC # BLD: 3.87 M/UL — SIGNIFICANT CHANGE UP (ref 3.8–5.2)
RBC # FLD: 14.8 % — HIGH (ref 10.3–14.5)
SODIUM SERPL-SCNC: 136 MMOL/L — SIGNIFICANT CHANGE UP (ref 135–145)
URATE SERPL-MCNC: 8.9 MG/DL — HIGH (ref 2.5–7)
WBC # BLD: 7.75 K/UL — SIGNIFICANT CHANGE UP (ref 3.8–10.5)
WBC # FLD AUTO: 7.75 K/UL — SIGNIFICANT CHANGE UP (ref 3.8–10.5)

## 2020-02-21 PROCEDURE — 99233 SBSQ HOSP IP/OBS HIGH 50: CPT | Mod: GC

## 2020-02-21 RX ORDER — FUROSEMIDE 40 MG
40 TABLET ORAL
Refills: 0 | Status: DISCONTINUED | OUTPATIENT
Start: 2020-02-21 | End: 2020-02-24

## 2020-02-21 RX ORDER — APIXABAN 2.5 MG/1
2.5 TABLET, FILM COATED ORAL ONCE
Refills: 0 | Status: DISCONTINUED | OUTPATIENT
Start: 2020-02-21 | End: 2020-02-21

## 2020-02-21 RX ORDER — APIXABAN 2.5 MG/1
2.5 TABLET, FILM COATED ORAL
Refills: 0 | Status: DISCONTINUED | OUTPATIENT
Start: 2020-02-21 | End: 2020-02-24

## 2020-02-21 RX ADMIN — Medication 975 MILLIGRAM(S): at 09:00

## 2020-02-21 RX ADMIN — Medication 20 MILLIGRAM(S): at 17:07

## 2020-02-21 RX ADMIN — ISOSORBIDE MONONITRATE 60 MILLIGRAM(S): 60 TABLET, EXTENDED RELEASE ORAL at 12:37

## 2020-02-21 RX ADMIN — Medication 1: at 17:50

## 2020-02-21 RX ADMIN — POLYETHYLENE GLYCOL 3350 17 GRAM(S): 17 POWDER, FOR SOLUTION ORAL at 17:09

## 2020-02-21 RX ADMIN — Medication 40 MILLIGRAM(S): at 17:07

## 2020-02-21 RX ADMIN — LIDOCAINE 1 PATCH: 4 CREAM TOPICAL at 17:58

## 2020-02-21 RX ADMIN — APIXABAN 2.5 MILLIGRAM(S): 2.5 TABLET, FILM COATED ORAL at 17:55

## 2020-02-21 RX ADMIN — Medication 3 MILLILITER(S): at 05:08

## 2020-02-21 RX ADMIN — LIDOCAINE 1 PATCH: 4 CREAM TOPICAL at 09:04

## 2020-02-21 RX ADMIN — Medication 3 MILLILITER(S): at 17:08

## 2020-02-21 RX ADMIN — Medication 20 MILLIGRAM(S): at 21:29

## 2020-02-21 RX ADMIN — Medication 1 SPRAY(S): at 17:07

## 2020-02-21 RX ADMIN — SPIRONOLACTONE 12.5 MILLIGRAM(S): 25 TABLET, FILM COATED ORAL at 05:07

## 2020-02-21 RX ADMIN — Medication 975 MILLIGRAM(S): at 10:44

## 2020-02-21 RX ADMIN — Medication 975 MILLIGRAM(S): at 17:56

## 2020-02-21 RX ADMIN — OXYCODONE HYDROCHLORIDE 10 MILLIGRAM(S): 5 TABLET ORAL at 16:01

## 2020-02-21 RX ADMIN — APIXABAN 2.5 MILLIGRAM(S): 2.5 TABLET, FILM COATED ORAL at 05:07

## 2020-02-21 RX ADMIN — GABAPENTIN 300 MILLIGRAM(S): 400 CAPSULE ORAL at 05:08

## 2020-02-21 RX ADMIN — LIDOCAINE 1 PATCH: 4 CREAM TOPICAL at 08:29

## 2020-02-21 RX ADMIN — Medication 1 SPRAY(S): at 05:06

## 2020-02-21 RX ADMIN — Medication 100 MILLIGRAM(S): at 17:08

## 2020-02-21 RX ADMIN — OXYCODONE HYDROCHLORIDE 10 MILLIGRAM(S): 5 TABLET ORAL at 17:05

## 2020-02-21 RX ADMIN — Medication 20 MILLIGRAM(S): at 05:07

## 2020-02-21 RX ADMIN — Medication 3: at 12:38

## 2020-02-21 RX ADMIN — Medication 100 MILLIGRAM(S): at 05:07

## 2020-02-21 RX ADMIN — ATORVASTATIN CALCIUM 10 MILLIGRAM(S): 80 TABLET, FILM COATED ORAL at 21:30

## 2020-02-21 RX ADMIN — Medication 25 MILLIGRAM(S): at 05:07

## 2020-02-21 RX ADMIN — Medication 2: at 21:52

## 2020-02-21 RX ADMIN — Medication 1 SPRAY(S): at 05:18

## 2020-02-21 RX ADMIN — LIDOCAINE 1 PATCH: 4 CREAM TOPICAL at 12:37

## 2020-02-21 RX ADMIN — Medication 975 MILLIGRAM(S): at 17:08

## 2020-02-21 RX ADMIN — Medication 1: at 08:58

## 2020-02-21 RX ADMIN — Medication 0.3 MILLIGRAM(S): at 12:36

## 2020-02-21 RX ADMIN — GABAPENTIN 300 MILLIGRAM(S): 400 CAPSULE ORAL at 17:08

## 2020-02-21 NOTE — PROGRESS NOTE ADULT - ATTENDING COMMENTS
Pt aw acute diastolic chf, improved w diuresis. Now w R wrist pain- likely gouty arthritis. On Colchicine. 1 dose of Prednisone ordered.    D/c planning for tomorrow if pain improves.

## 2020-02-21 NOTE — PROGRESS NOTE ADULT - PROBLEM SELECTOR PLAN 2
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 DDx includes septic joint however less likely as patient otherwise non-toxic appearing, no effusion felt.  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 DDx includes septic joint however less likely as patient otherwise non-toxic appearing, no effusion felt.  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 DDx includes septic joint however less likely as patient otherwise non-toxic appearing, no effusion felt.  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 DDx includes septic joint however less likely as patient otherwise non-toxic appearing, no effusion felt.  -Right wrist x-rays show arthritis changes, no fracture  -Colchicine 0.3mg daily, Lidocaine patch to wrist, standing tylenol  -serum urea level 8.9  -Will give Prednisone 20mg PO x 1 today

## 2020-02-21 NOTE — PROGRESS NOTE ADULT - PROBLEM SELECTOR PLAN 1
hypoxic to 89% at outpt clinic, SOB today, b/l basilar crackles, significant peripheral edema on home bumex 1mg BID. ProBNP 1072. Trop neg. CXR clear, RVP neg.  -Now weaned to room air  -Repeat CXR with clear lungs. Encourage incentive spirometry.  -Continue with Lasix to 20mg PO BID  -strict I&Os  -monitor on tele  -continue home lopressor 25, spironolactone 12.5 qd  -Dr. Hagan contacted via email  -symptomatic support with saline spray and duoneb hypoxic to 89% at outpt clinic, SOB today, b/l basilar crackles, significant peripheral edema on home bumex 1mg BID. ProBNP 1072. Trop neg. CXR clear, RVP neg.  -Now weaned to room air  -Repeat CXR with clear lungs. Encourage incentive spirometry.  -Lasix 40mg PO BID  -strict I&Os  -continue home lopressor 25, spironolactone 12.5 qd  -Dr. Hagan contacted via email  -symptomatic support with saline spray and duoneb

## 2020-02-21 NOTE — PROGRESS NOTE ADULT - SUBJECTIVE AND OBJECTIVE BOX
Chely Zarate, PGY-1  Internal Medicine  Pager #: [phone] / [facility] [phone]    PROGRESS NOTE:     Patient is a 80y old  Female who presents with a chief complaint of SOB (20 Feb 2020 13:18)      SUBJECTIVE / OVERNIGHT EVENTS:    ADDITIONAL REVIEW OF SYSTEMS:    MEDICATIONS  (STANDING):  acetaminophen   Tablet .. 975 milliGRAM(s) Oral every 8 hours  albuterol/ipratropium for Nebulization. 3 milliLiter(s) Nebulizer two times a day  apixaban 2.5 milliGRAM(s) Oral two times a day  atorvastatin 10 milliGRAM(s) Oral at bedtime  colchicine 0.3 milliGRAM(s) Oral daily  dextrose 5%. 1000 milliLiter(s) (50 mL/Hr) IV Continuous <Continuous>  dextrose 50% Injectable 12.5 Gram(s) IV Push once  dextrose 50% Injectable 25 Gram(s) IV Push once  dextrose 50% Injectable 25 Gram(s) IV Push once  fluticasone propionate 50 MICROgram(s)/spray Nasal Spray 1 Spray(s) Both Nostrils two times a day  furosemide    Tablet 20 milliGRAM(s) Oral two times a day  gabapentin 300 milliGRAM(s) Oral two times a day  hydrALAZINE 100 milliGRAM(s) Oral two times a day  insulin lispro (HumaLOG) corrective regimen sliding scale   SubCutaneous three times a day before meals  insulin lispro (HumaLOG) corrective regimen sliding scale   SubCutaneous at bedtime  isosorbide   mononitrate ER Tablet (IMDUR) 60 milliGRAM(s) Oral daily  lidocaine   Patch 1 Patch Transdermal daily  metoprolol succinate ER 25 milliGRAM(s) Oral daily  oxyCODONE  ER Tablet 10 milliGRAM(s) Oral every 8 hours  polyethylene glycol 3350 17 Gram(s) Oral two times a day  senna 2 Tablet(s) Oral at bedtime  sodium chloride 0.65% Nasal 1 Spray(s) Both Nostrils two times a day  spironolactone 12.5 milliGRAM(s) Oral daily    MEDICATIONS  (PRN):  bisacodyl 5 milliGRAM(s) Oral every 12 hours PRN Constipation  dextrose 40% Gel 15 Gram(s) Oral once PRN Blood Glucose LESS THAN 70 milliGRAM(s)/deciliter  glucagon  Injectable 1 milliGRAM(s) IntraMuscular once PRN Glucose LESS THAN 70 milligrams/deciliter      CAPILLARY BLOOD GLUCOSE      POCT Blood Glucose.: 257 mg/dL (20 Feb 2020 21:12)  POCT Blood Glucose.: 180 mg/dL (20 Feb 2020 17:01)  POCT Blood Glucose.: 214 mg/dL (20 Feb 2020 11:47)  POCT Blood Glucose.: 201 mg/dL (20 Feb 2020 07:49)    I&O's Summary    20 Feb 2020 07:01  -  21 Feb 2020 07:00  --------------------------------------------------------  IN: 476 mL / OUT: 3075 mL / NET: -2599 mL        PHYSICAL EXAM:  Vital Signs Last 24 Hrs  T(C): 36.8 (21 Feb 2020 05:17), Max: 36.9 (20 Feb 2020 12:08)  T(F): 98.2 (21 Feb 2020 05:17), Max: 98.5 (20 Feb 2020 12:08)  HR: 64 (21 Feb 2020 05:17) (64 - 79)  BP: 122/60 (21 Feb 2020 05:17) (116/71 - 139/69)  BP(mean): --  RR: 18 (21 Feb 2020 05:17) (18 - 18)  SpO2: 94% (21 Feb 2020 05:17) (93% - 95%)    General: No respiratory distress on room air, however appears very uncomfortable and teary  HEENT: NCAT.  PERRL.  EOMI.  No scleral icterus or injection.    Neck: Supple.  Full ROM.  No JVD.    Heart: RRR.  Normal S1 and S2.  No murmurs, rubs, or gallops.   Lungs: +soft crackles at the bases bilaterally   Abdomen: BS+, soft, NT/ND.   Skin: redness of b/l lower extremities  Extremities: no pitting edema in b/l lower ext, non-pitting edema and erythema of right upper extremity that appears similar to baseline however with tenderness to palpation at the wrist and limited ROM from pain  Musculoskeletal: No deformities.    Neuro: No neuro focal deficits    LABS:          RADIOLOGY & ADDITIONAL TESTS:  Results Reviewed:   Imaging Personally Reviewed:  Electrocardiogram Personally Reviewed:    COORDINATION OF CARE:  Care Discussed with Consultants/Other Providers [Y/N]:  Prior or Outpatient Records Reviewed [Y/N]: Chely Zarate, PGY-1  Internal Medicine  Pager #: [phone] / [facility] [phone]    PROGRESS NOTE:     Patient is a 80y old  Female who presents with a chief complaint of SOB (20 Feb 2020 13:18)    SUBJECTIVE / OVERNIGHT EVENTS:  -No acute events overnight.    Romans[place]-speaking, [place]  ID # [id number]. Reports wrist swelling worse than yesterday but pain improved with tylenol.    ADDITIONAL REVIEW OF SYSTEMS:  No fever/chills, chest pain, SOB, abd pain, n/v/d, constipation.    MEDICATIONS  (STANDING):  acetaminophen   Tablet .. 975 milliGRAM(s) Oral every 8 hours  albuterol/ipratropium for Nebulization. 3 milliLiter(s) Nebulizer two times a day  apixaban 2.5 milliGRAM(s) Oral two times a day  atorvastatin 10 milliGRAM(s) Oral at bedtime  colchicine 0.3 milliGRAM(s) Oral daily  dextrose 5%. 1000 milliLiter(s) (50 mL/Hr) IV Continuous <Continuous>  dextrose 50% Injectable 12.5 Gram(s) IV Push once  dextrose 50% Injectable 25 Gram(s) IV Push once  dextrose 50% Injectable 25 Gram(s) IV Push once  fluticasone propionate 50 MICROgram(s)/spray Nasal Spray 1 Spray(s) Both Nostrils two times a day  furosemide    Tablet 20 milliGRAM(s) Oral two times a day  gabapentin 300 milliGRAM(s) Oral two times a day  hydrALAZINE 100 milliGRAM(s) Oral two times a day  insulin lispro (HumaLOG) corrective regimen sliding scale   SubCutaneous three times a day before meals  insulin lispro (HumaLOG) corrective regimen sliding scale   SubCutaneous at bedtime  isosorbide   mononitrate ER Tablet (IMDUR) 60 milliGRAM(s) Oral daily  lidocaine   Patch 1 Patch Transdermal daily  metoprolol succinate ER 25 milliGRAM(s) Oral daily  oxyCODONE  ER Tablet 10 milliGRAM(s) Oral every 8 hours  polyethylene glycol 3350 17 Gram(s) Oral two times a day  senna 2 Tablet(s) Oral at bedtime  sodium chloride 0.65% Nasal 1 Spray(s) Both Nostrils two times a day  spironolactone 12.5 milliGRAM(s) Oral daily    MEDICATIONS  (PRN):  bisacodyl 5 milliGRAM(s) Oral every 12 hours PRN Constipation  dextrose 40% Gel 15 Gram(s) Oral once PRN Blood Glucose LESS THAN 70 milliGRAM(s)/deciliter  glucagon  Injectable 1 milliGRAM(s) IntraMuscular once PRN Glucose LESS THAN 70 milligrams/deciliter      CAPILLARY BLOOD GLUCOSE      POCT Blood Glucose.: 257 mg/dL (20 Feb 2020 21:12)  POCT Blood Glucose.: 180 mg/dL (20 Feb 2020 17:01)  POCT Blood Glucose.: 214 mg/dL (20 Feb 2020 11:47)  POCT Blood Glucose.: 201 mg/dL (20 Feb 2020 07:49)    I&O's Summary    20 Feb 2020 07:01  -  21 Feb 2020 07:00  --------------------------------------------------------  IN: 476 mL / OUT: 3075 mL / NET: -2599 mL        PHYSICAL EXAM:  Vital Signs Last 24 Hrs  T(C): 36.8 (21 Feb 2020 05:17), Max: 36.9 (20 Feb 2020 12:08)  T(F): 98.2 (21 Feb 2020 05:17), Max: 98.5 (20 Feb 2020 12:08)  HR: 64 (21 Feb 2020 05:17) (64 - 79)  BP: 122/60 (21 Feb 2020 05:17) (116/71 - 139/69)  BP(mean): --  RR: 18 (21 Feb 2020 05:17) (18 - 18)  SpO2: 94% (21 Feb 2020 05:17) (93% - 95%)    General: No respiratory distress on room air, however appears very uncomfortable and teary  HEENT: NCAT.  PERRL.  EOMI.  No scleral icterus or injection.    Neck: Supple.  Full ROM.  No JVD.    Heart: RRR.  Normal S1 and S2.  No murmurs, rubs, or gallops.   Lungs: +soft crackles at the bases bilaterally   Abdomen: BS+, soft, NT/ND.   Skin: redness of b/l lower extremities  Extremities: no pitting edema in b/l lower ext, non-pitting edema and erythema of right upper extremity that appears similar to yesterday however with tenderness to palpation at the wrist and limited active & passive ROM from pain  Musculoskeletal: No deformities.    Neuro: No neuro focal deficits    LABS:          RADIOLOGY & ADDITIONAL TESTS:  Results Reviewed:   Imaging Personally Reviewed:  Electrocardiogram Personally Reviewed:    COORDINATION OF CARE:  Care Discussed with Consultants/Other Providers [Y/N]:  Prior or Outpatient Records Reviewed [Y/N]:

## 2020-02-21 NOTE — PROGRESS NOTE ADULT - ASSESSMENT
81 y/o Kazakh speaking F PMHx of HFpEF (EF 60-65%, stage II diastolic dysfunction), CAD (s/p NST showing areas of infarct, no prior caths), CKD (Stage III), Breast cancer (s/p R mastectomy in 1989, c/b chronic RUE lymphedema, rectal cancer (s/p reversed ileostomy in 2015), RLE DVT (2/2019, on Eliquis), chronic R hip pain, T2DM (A1C 6.7 in 12/2019), HTN, HLD, s/p knee replacement 2017, recent admission in December for RLE swelling and redness treated for CHF exacerbation presented to the ED with SOB and chest pain, admitted for likely CHF exacerbation now being diuresed. Course c/b TAO on CKD, new right wrist pain.

## 2020-02-21 NOTE — PROGRESS NOTE ADULT - PROBLEM SELECTOR PLAN 3
Increased erythema on RLE for the past 3 days, more tender than LLE. Duplex from 12/2019 neg for DVT. Cellulitis vs chronic venous stasis in the setting of CHF vs lymphedema. Stable  -monitor off antibiotics given leukocytosis resolved, afebrile  -dec lasix as above  -continue home pain meds oxycontin 10 TID and gabapentin 300 BID (home gralise 600qd) for leg pain Increased erythema on RLE for the past 3 days, more tender than LLE. Duplex from 12/2019 neg for DVT. Cellulitis vs chronic venous stasis in the setting of CHF vs lymphedema. Stable  -monitor off antibiotics given leukocytosis resolved, afebrile  -lasix as above  -continue home pain meds oxycontin 10 TID and gabapentin 300 BID (home gralise 600qd) for leg pain

## 2020-02-22 LAB
GLUCOSE BLDC GLUCOMTR-MCNC: 185 MG/DL — HIGH (ref 70–99)
GLUCOSE BLDC GLUCOMTR-MCNC: 243 MG/DL — HIGH (ref 70–99)
GLUCOSE BLDC GLUCOMTR-MCNC: 245 MG/DL — HIGH (ref 70–99)
GLUCOSE BLDC GLUCOMTR-MCNC: 366 MG/DL — HIGH (ref 70–99)

## 2020-02-22 PROCEDURE — 99233 SBSQ HOSP IP/OBS HIGH 50: CPT

## 2020-02-22 RX ORDER — COLCHICINE 0.6 MG
0.5 TABLET ORAL
Qty: 7 | Refills: 0
Start: 2020-02-22 | End: 2020-03-06

## 2020-02-22 RX ADMIN — GABAPENTIN 300 MILLIGRAM(S): 400 CAPSULE ORAL at 05:06

## 2020-02-22 RX ADMIN — ISOSORBIDE MONONITRATE 60 MILLIGRAM(S): 60 TABLET, EXTENDED RELEASE ORAL at 11:39

## 2020-02-22 RX ADMIN — Medication 40 MILLIGRAM(S): at 05:07

## 2020-02-22 RX ADMIN — OXYCODONE HYDROCHLORIDE 10 MILLIGRAM(S): 5 TABLET ORAL at 11:39

## 2020-02-22 RX ADMIN — OXYCODONE HYDROCHLORIDE 10 MILLIGRAM(S): 5 TABLET ORAL at 19:30

## 2020-02-22 RX ADMIN — LIDOCAINE 1 PATCH: 4 CREAM TOPICAL at 23:36

## 2020-02-22 RX ADMIN — Medication 975 MILLIGRAM(S): at 05:35

## 2020-02-22 RX ADMIN — Medication 1 SPRAY(S): at 05:07

## 2020-02-22 RX ADMIN — Medication 2: at 12:24

## 2020-02-22 RX ADMIN — Medication 5: at 17:48

## 2020-02-22 RX ADMIN — Medication 25 MILLIGRAM(S): at 05:06

## 2020-02-22 RX ADMIN — LIDOCAINE 1 PATCH: 4 CREAM TOPICAL at 00:00

## 2020-02-22 RX ADMIN — Medication 1 SPRAY(S): at 18:15

## 2020-02-22 RX ADMIN — SENNA PLUS 2 TABLET(S): 8.6 TABLET ORAL at 21:59

## 2020-02-22 RX ADMIN — Medication 0.3 MILLIGRAM(S): at 11:39

## 2020-02-22 RX ADMIN — Medication 2: at 08:18

## 2020-02-22 RX ADMIN — POLYETHYLENE GLYCOL 3350 17 GRAM(S): 17 POWDER, FOR SOLUTION ORAL at 18:24

## 2020-02-22 RX ADMIN — APIXABAN 2.5 MILLIGRAM(S): 2.5 TABLET, FILM COATED ORAL at 18:15

## 2020-02-22 RX ADMIN — APIXABAN 2.5 MILLIGRAM(S): 2.5 TABLET, FILM COATED ORAL at 05:07

## 2020-02-22 RX ADMIN — LIDOCAINE 1 PATCH: 4 CREAM TOPICAL at 18:20

## 2020-02-22 RX ADMIN — Medication 100 MILLIGRAM(S): at 18:16

## 2020-02-22 RX ADMIN — ATORVASTATIN CALCIUM 10 MILLIGRAM(S): 80 TABLET, FILM COATED ORAL at 21:59

## 2020-02-22 RX ADMIN — SPIRONOLACTONE 12.5 MILLIGRAM(S): 25 TABLET, FILM COATED ORAL at 05:06

## 2020-02-22 RX ADMIN — OXYCODONE HYDROCHLORIDE 10 MILLIGRAM(S): 5 TABLET ORAL at 12:00

## 2020-02-22 RX ADMIN — Medication 3 MILLILITER(S): at 18:15

## 2020-02-22 RX ADMIN — GABAPENTIN 300 MILLIGRAM(S): 400 CAPSULE ORAL at 18:15

## 2020-02-22 RX ADMIN — Medication 100 MILLIGRAM(S): at 05:07

## 2020-02-22 RX ADMIN — OXYCODONE HYDROCHLORIDE 10 MILLIGRAM(S): 5 TABLET ORAL at 18:23

## 2020-02-22 RX ADMIN — Medication 975 MILLIGRAM(S): at 05:06

## 2020-02-22 RX ADMIN — Medication 40 MILLIGRAM(S): at 18:15

## 2020-02-22 RX ADMIN — LIDOCAINE 1 PATCH: 4 CREAM TOPICAL at 11:40

## 2020-02-22 RX ADMIN — Medication 1 SPRAY(S): at 05:08

## 2020-02-22 RX ADMIN — Medication 3 MILLILITER(S): at 05:07

## 2020-02-22 NOTE — PROGRESS NOTE ADULT - ASSESSMENT
79 y/o Pashto speaking F PMHx of HFpEF (EF 60-65%, stage II diastolic dysfunction), CAD (s/p NST showing areas of infarct, no prior caths), CKD (Stage III), Breast cancer (s/p R mastectomy in 1989, c/b chronic RUE lymphedema, rectal cancer (s/p reversed ileostomy in 2015), RLE DVT (2/2019, on Eliquis), chronic R hip pain, T2DM (A1C 6.7 in 12/2019), HTN, HLD, s/p knee replacement 2017, recent admission in December for RLE swelling and redness treated for CHF exacerbation presented to the ED with SOB and chest pain, admitted for likely CHF exacerbation now being diuresed. Course c/b TAO on CKD, new right wrist pain. 81 y/o Khmer speaking F PMHx of HFpEF (EF 60-65%, stage II diastolic dysfunction), CAD (s/p NST showing areas of infarct, no prior caths), CKD (Stage III), Breast cancer (s/p R mastectomy in 1989, c/b chronic RUE lymphedema, rectal cancer (s/p reversed ileostomy in 2015), RLE DVT (2/2019, on Eliquis), chronic R hip pain, T2DM (A1C 6.7 in 12/2019), HTN, HLD, s/p knee replacement 2017, recent admission in December for RLE swelling and redness treated for CHF exacerbation presented to the ED with SOB and chest pain, admitted for likely CHF exacerbation now diuresed and euvolemic. Course c/b TAO on CKD, new right wrist pain likely 2/2 gout flare.

## 2020-02-22 NOTE — PROGRESS NOTE ADULT - PROBLEM SELECTOR PLAN 1
hypoxic to 89% at outpt clinic, SOB today, b/l basilar crackles, significant peripheral edema on home bumex 1mg BID. ProBNP 1072. Trop neg. CXR clear, RVP neg.  -Now weaned to room air  -Repeat CXR with clear lungs. Encourage incentive spirometry.  -Lasix 40mg PO BID  -strict I&Os  -continue home lopressor 25, spironolactone 12.5 qd  -Dr. Hagan contacted via email  -symptomatic support with saline spray and duoneb

## 2020-02-22 NOTE — PROGRESS NOTE ADULT - SUBJECTIVE AND OBJECTIVE BOX
Chely Zarate, PGY-1  Internal Medicine  Pager #: [phone] / [facility] [phone]    PROGRESS NOTE:     Patient is a 80y old  Female who presents with a chief complaint of SOB (21 Feb 2020 06:59)      SUBJECTIVE / OVERNIGHT EVENTS:  -No acute overnight events.    [place]-speaking, [place]  ID # ***. Reports wrist swelling and pain *** than yesterday.    ADDITIONAL REVIEW OF SYSTEMS:  No fever/chills, chest pain, SOB, abd pain, n/v/d, constipation.    MEDICATIONS  (STANDING):  albuterol/ipratropium for Nebulization. 3 milliLiter(s) Nebulizer two times a day  apixaban 2.5 milliGRAM(s) Oral two times a day  atorvastatin 10 milliGRAM(s) Oral at bedtime  colchicine 0.3 milliGRAM(s) Oral daily  dextrose 5%. 1000 milliLiter(s) (50 mL/Hr) IV Continuous <Continuous>  dextrose 50% Injectable 12.5 Gram(s) IV Push once  dextrose 50% Injectable 25 Gram(s) IV Push once  dextrose 50% Injectable 25 Gram(s) IV Push once  fluticasone propionate 50 MICROgram(s)/spray Nasal Spray 1 Spray(s) Both Nostrils two times a day  furosemide    Tablet 40 milliGRAM(s) Oral two times a day  gabapentin 300 milliGRAM(s) Oral two times a day  hydrALAZINE 100 milliGRAM(s) Oral two times a day  insulin lispro (HumaLOG) corrective regimen sliding scale   SubCutaneous three times a day before meals  insulin lispro (HumaLOG) corrective regimen sliding scale   SubCutaneous at bedtime  isosorbide   mononitrate ER Tablet (IMDUR) 60 milliGRAM(s) Oral daily  lidocaine   Patch 1 Patch Transdermal daily  metoprolol succinate ER 25 milliGRAM(s) Oral daily  oxyCODONE  ER Tablet 10 milliGRAM(s) Oral every 8 hours  polyethylene glycol 3350 17 Gram(s) Oral two times a day  senna 2 Tablet(s) Oral at bedtime  sodium chloride 0.65% Nasal 1 Spray(s) Both Nostrils two times a day  spironolactone 12.5 milliGRAM(s) Oral daily    MEDICATIONS  (PRN):  bisacodyl 5 milliGRAM(s) Oral every 12 hours PRN Constipation  dextrose 40% Gel 15 Gram(s) Oral once PRN Blood Glucose LESS THAN 70 milliGRAM(s)/deciliter  glucagon  Injectable 1 milliGRAM(s) IntraMuscular once PRN Glucose LESS THAN 70 milligrams/deciliter      CAPILLARY BLOOD GLUCOSE      POCT Blood Glucose.: 302 mg/dL (21 Feb 2020 21:47)  POCT Blood Glucose.: 191 mg/dL (21 Feb 2020 17:15)  POCT Blood Glucose.: 278 mg/dL (21 Feb 2020 11:41)  POCT Blood Glucose.: 179 mg/dL (21 Feb 2020 08:54)  POCT Blood Glucose.: 175 mg/dL (21 Feb 2020 07:29)    I&O's Summary    21 Feb 2020 07:01  -  22 Feb 2020 07:00  --------------------------------------------------------  IN: 600 mL / OUT: 1350 mL / NET: -750 mL        PHYSICAL EXAM:  Vital Signs Last 24 Hrs  T(C): 36.8 (22 Feb 2020 04:44), Max: 36.8 (22 Feb 2020 04:44)  T(F): 98.2 (22 Feb 2020 04:44), Max: 98.2 (22 Feb 2020 04:44)  HR: 68 (22 Feb 2020 04:44) (65 - 71)  BP: 148/74 (22 Feb 2020 04:44) (127/52 - 148/74)  BP(mean): --  RR: 16 (22 Feb 2020 04:44) (16 - 18)  SpO2: 96% (22 Feb 2020 04:44) (93% - 96%)    General: No respiratory distress on room air, however appears very uncomfortable and teary  HEENT: NCAT.  PERRL.  EOMI.  No scleral icterus or injection.    Neck: Supple.  Full ROM.  No JVD.    Heart: RRR.  Normal S1 and S2.  No murmurs, rubs, or gallops.   Lungs: +soft crackles at the bases bilaterally   Abdomen: BS+, soft, NT/ND.   Skin: redness of b/l lower extremities  Extremities: no pitting edema in b/l lower ext, non-pitting edema and erythema of right upper extremity that appears similar to yesterday however with tenderness to palpation at the wrist and limited active & passive ROM from pain  Musculoskeletal: No deformities.    Neuro: No neuro focal deficits    LABS:                        11.3   7.75  )-----------( 250      ( 21 Feb 2020 07:01 )             37.7     02-21    136  |  92<L>  |  52<H>  ----------------------------<  173<H>  4.3   |  28  |  1.90<H>    Ca    9.5      21 Feb 2020 06:59  Phos  3.7     02-21  Mg     2.4     02-21                  RADIOLOGY & ADDITIONAL TESTS:  Results Reviewed:   Imaging Personally Reviewed:  Electrocardiogram Personally Reviewed:    COORDINATION OF CARE:  Care Discussed with Consultants/Other Providers [Y/N]:  Prior or Outpatient Records Reviewed [Y/N]: Chely Zarate, PGY-1  Internal Medicine  Pager #: [phone] / [facility] [phone]    PROGRESS NOTE:     Patient is a 80y old  Female who presents with a chief complaint of SOB (21 Feb 2020 06:59)      SUBJECTIVE / OVERNIGHT EVENTS:  -No acute overnight events.    Angolan-speaking, [place]  ID # [id number]. Reports wrist swelling and pain improved compared to yesterday. Able to move wrist more, but still with pain when trying to ambulate with walker. Concerned that she will not be able to move around and care for self at home with wrist still in this condition.    ADDITIONAL REVIEW OF SYSTEMS:  No fever/chills, chest pain, SOB, abd pain, n/v/d, constipation.    MEDICATIONS  (STANDING):  albuterol/ipratropium for Nebulization. 3 milliLiter(s) Nebulizer two times a day  apixaban 2.5 milliGRAM(s) Oral two times a day  atorvastatin 10 milliGRAM(s) Oral at bedtime  colchicine 0.3 milliGRAM(s) Oral daily  dextrose 5%. 1000 milliLiter(s) (50 mL/Hr) IV Continuous <Continuous>  dextrose 50% Injectable 12.5 Gram(s) IV Push once  dextrose 50% Injectable 25 Gram(s) IV Push once  dextrose 50% Injectable 25 Gram(s) IV Push once  fluticasone propionate 50 MICROgram(s)/spray Nasal Spray 1 Spray(s) Both Nostrils two times a day  furosemide    Tablet 40 milliGRAM(s) Oral two times a day  gabapentin 300 milliGRAM(s) Oral two times a day  hydrALAZINE 100 milliGRAM(s) Oral two times a day  insulin lispro (HumaLOG) corrective regimen sliding scale   SubCutaneous three times a day before meals  insulin lispro (HumaLOG) corrective regimen sliding scale   SubCutaneous at bedtime  isosorbide   mononitrate ER Tablet (IMDUR) 60 milliGRAM(s) Oral daily  lidocaine   Patch 1 Patch Transdermal daily  metoprolol succinate ER 25 milliGRAM(s) Oral daily  oxyCODONE  ER Tablet 10 milliGRAM(s) Oral every 8 hours  polyethylene glycol 3350 17 Gram(s) Oral two times a day  senna 2 Tablet(s) Oral at bedtime  sodium chloride 0.65% Nasal 1 Spray(s) Both Nostrils two times a day  spironolactone 12.5 milliGRAM(s) Oral daily    MEDICATIONS  (PRN):  bisacodyl 5 milliGRAM(s) Oral every 12 hours PRN Constipation  dextrose 40% Gel 15 Gram(s) Oral once PRN Blood Glucose LESS THAN 70 milliGRAM(s)/deciliter  glucagon  Injectable 1 milliGRAM(s) IntraMuscular once PRN Glucose LESS THAN 70 milligrams/deciliter      CAPILLARY BLOOD GLUCOSE      POCT Blood Glucose.: 302 mg/dL (21 Feb 2020 21:47)  POCT Blood Glucose.: 191 mg/dL (21 Feb 2020 17:15)  POCT Blood Glucose.: 278 mg/dL (21 Feb 2020 11:41)  POCT Blood Glucose.: 179 mg/dL (21 Feb 2020 08:54)  POCT Blood Glucose.: 175 mg/dL (21 Feb 2020 07:29)    I&O's Summary    21 Feb 2020 07:01  -  22 Feb 2020 07:00  --------------------------------------------------------  IN: 600 mL / OUT: 1350 mL / NET: -750 mL        PHYSICAL EXAM:  Vital Signs Last 24 Hrs  T(C): 36.8 (22 Feb 2020 04:44), Max: 36.8 (22 Feb 2020 04:44)  T(F): 98.2 (22 Feb 2020 04:44), Max: 98.2 (22 Feb 2020 04:44)  HR: 68 (22 Feb 2020 04:44) (65 - 71)  BP: 148/74 (22 Feb 2020 04:44) (127/52 - 148/74)  BP(mean): --  RR: 16 (22 Feb 2020 04:44) (16 - 18)  SpO2: 96% (22 Feb 2020 04:44) (93% - 96%)    General: No respiratory distress on room air  HEENT: NCAT.  PERRL.  EOMI.  No scleral icterus or injection.    Neck: Supple.  Full ROM.  No JVD.    Heart: RRR.  Normal S1 and S2.  No murmurs, rubs, or gallops.   Lungs: +soft crackles at the bases bilaterally   Abdomen: BS+, soft, NT/ND.   Skin: redness of b/l lower extremities  Extremities: no pitting edema in b/l lower ext, non-pitting edema and erythema of right upper extremity that appears similar to baseline. Mild tenderness to palpation at the wrist and improved active ROM at wrist.  Musculoskeletal: No deformities.    Neuro: No neuro focal deficits    LABS:                        11.3   7.75  )-----------( 250      ( 21 Feb 2020 07:01 )             37.7     02-21    136  |  92<L>  |  52<H>  ----------------------------<  173<H>  4.3   |  28  |  1.90<H>    Ca    9.5      21 Feb 2020 06:59  Phos  3.7     02-21  Mg     2.4     02-21        RADIOLOGY & ADDITIONAL TESTS:  Results Reviewed:   Imaging Personally Reviewed:  Electrocardiogram Personally Reviewed:    COORDINATION OF CARE:  Care Discussed with Consultants/Other Providers [Y/N]:  Prior or Outpatient Records Reviewed [Y/N]:

## 2020-02-22 NOTE — PROGRESS NOTE ADULT - PROBLEM SELECTOR PLAN 7
A1C 6.7 in 12/2019 on home glimperide and januvia  -ISS while inpatient  -FS qhs, qac    Chronic hip pain  on home oxycontin 10TID and gralise 600qd  -oxycodone 10 TID and gabapentin 300 BID A1C 6.7 in 12/2019 on home glimperide and januvia  -ISS while inpatient  -FS qhs, qac  -FS more elevated overnight, likely in setting of one-time dose of steroid yesterday. Continue to monitor.  Chronic hip pain  on home oxycontin 10TID and gralise 600qd  -oxycodone 10 TID and gabapentin 300 BID

## 2020-02-22 NOTE — PROGRESS NOTE ADULT - ATTENDING COMMENTS
Patient seen and examined.  Agree with resident note.  Meds, labs and vitals all reviewed.  Patient with CHF, now euvolemic.  Still with gout flare in Right wrist.  Improving, s/p one dose of steroids yesterday.  Monitor FS's. Continue with dose reduced Colchicine.

## 2020-02-22 NOTE — PROGRESS NOTE ADULT - PROBLEM SELECTOR PLAN 2
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 DDx includes septic joint however less likely as patient otherwise non-toxic appearing, no effusion felt.  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 DDx includes septic joint however less likely as patient otherwise non-toxic appearing, no effusion felt.  -Right wrist x-rays show arthritis changes, no fracture  -Colchicine 0.3mg daily, Lidocaine patch to wrist, standing tylenol  -serum urea level 8.9  -s/p Prednisone 20mg PO x 1 on 2/22

## 2020-02-22 NOTE — PROGRESS NOTE ADULT - PROBLEM SELECTOR PLAN 3
Increased erythema on RLE for the past 3 days, more tender than LLE. Duplex from 12/2019 neg for DVT. Cellulitis vs chronic venous stasis in the setting of CHF vs lymphedema. Stable  -monitor off antibiotics given leukocytosis resolved, afebrile  -lasix as above  -continue home pain meds oxycontin 10 TID and gabapentin 300 BID (home gralise 600qd) for leg pain Increased erythema on RLE for the past 3 days, more tender than LLE. Duplex from 12/2019 neg for DVT. Cellulitis vs chronic venous stasis in the setting of CHF vs lymphedema. Lower extremities swelling/erythema now improved.  -monitor off antibiotics given leukocytosis resolved, afebrile  -lasix as above  -continue home pain meds oxycontin 10 TID and gabapentin 300 BID (home gralise 600qd) for leg pain

## 2020-02-23 DIAGNOSIS — R21 RASH AND OTHER NONSPECIFIC SKIN ERUPTION: ICD-10-CM

## 2020-02-23 DIAGNOSIS — R82.71 BACTERIURIA: ICD-10-CM

## 2020-02-23 LAB
ALBUMIN SERPL ELPH-MCNC: 3.7 G/DL — SIGNIFICANT CHANGE UP (ref 3.3–5)
ALP SERPL-CCNC: 62 U/L — SIGNIFICANT CHANGE UP (ref 40–120)
ALT FLD-CCNC: 12 U/L — SIGNIFICANT CHANGE UP (ref 10–45)
ANION GAP SERPL CALC-SCNC: 17 MMOL/L — SIGNIFICANT CHANGE UP (ref 5–17)
AST SERPL-CCNC: 21 U/L — SIGNIFICANT CHANGE UP (ref 10–40)
BILIRUB SERPL-MCNC: 0.3 MG/DL — SIGNIFICANT CHANGE UP (ref 0.2–1.2)
BUN SERPL-MCNC: 70 MG/DL — HIGH (ref 7–23)
CALCIUM SERPL-MCNC: 9.9 MG/DL — SIGNIFICANT CHANGE UP (ref 8.4–10.5)
CHLORIDE SERPL-SCNC: 93 MMOL/L — LOW (ref 96–108)
CO2 SERPL-SCNC: 28 MMOL/L — SIGNIFICANT CHANGE UP (ref 22–31)
CREAT SERPL-MCNC: 2.24 MG/DL — HIGH (ref 0.5–1.3)
GLUCOSE BLDC GLUCOMTR-MCNC: 214 MG/DL — HIGH (ref 70–99)
GLUCOSE BLDC GLUCOMTR-MCNC: 230 MG/DL — HIGH (ref 70–99)
GLUCOSE BLDC GLUCOMTR-MCNC: 236 MG/DL — HIGH (ref 70–99)
GLUCOSE BLDC GLUCOMTR-MCNC: 321 MG/DL — HIGH (ref 70–99)
GLUCOSE SERPL-MCNC: 217 MG/DL — HIGH (ref 70–99)
HCT VFR BLD CALC: 36.9 % — SIGNIFICANT CHANGE UP (ref 34.5–45)
HGB BLD-MCNC: 11.4 G/DL — LOW (ref 11.5–15.5)
MAGNESIUM SERPL-MCNC: 2.2 MG/DL — SIGNIFICANT CHANGE UP (ref 1.6–2.6)
MCHC RBC-ENTMCNC: 29.4 PG — SIGNIFICANT CHANGE UP (ref 27–34)
MCHC RBC-ENTMCNC: 30.9 GM/DL — LOW (ref 32–36)
MCV RBC AUTO: 95.1 FL — SIGNIFICANT CHANGE UP (ref 80–100)
NRBC # BLD: 0 /100 WBCS — SIGNIFICANT CHANGE UP (ref 0–0)
PHOSPHATE SERPL-MCNC: 3.7 MG/DL — SIGNIFICANT CHANGE UP (ref 2.5–4.5)
PLATELET # BLD AUTO: 380 K/UL — SIGNIFICANT CHANGE UP (ref 150–400)
POTASSIUM SERPL-MCNC: 4.4 MMOL/L — SIGNIFICANT CHANGE UP (ref 3.5–5.3)
POTASSIUM SERPL-SCNC: 4.4 MMOL/L — SIGNIFICANT CHANGE UP (ref 3.5–5.3)
PROT SERPL-MCNC: 7.6 G/DL — SIGNIFICANT CHANGE UP (ref 6–8.3)
RBC # BLD: 3.88 M/UL — SIGNIFICANT CHANGE UP (ref 3.8–5.2)
RBC # FLD: 14.7 % — HIGH (ref 10.3–14.5)
SODIUM SERPL-SCNC: 138 MMOL/L — SIGNIFICANT CHANGE UP (ref 135–145)
WBC # BLD: 8.31 K/UL — SIGNIFICANT CHANGE UP (ref 3.8–10.5)
WBC # FLD AUTO: 8.31 K/UL — SIGNIFICANT CHANGE UP (ref 3.8–10.5)

## 2020-02-23 PROCEDURE — 99233 SBSQ HOSP IP/OBS HIGH 50: CPT

## 2020-02-23 RX ADMIN — Medication 2: at 21:17

## 2020-02-23 RX ADMIN — Medication 25 MILLIGRAM(S): at 05:34

## 2020-02-23 RX ADMIN — POLYETHYLENE GLYCOL 3350 17 GRAM(S): 17 POWDER, FOR SOLUTION ORAL at 05:40

## 2020-02-23 RX ADMIN — OXYCODONE HYDROCHLORIDE 10 MILLIGRAM(S): 5 TABLET ORAL at 02:41

## 2020-02-23 RX ADMIN — Medication 100 MILLIGRAM(S): at 05:34

## 2020-02-23 RX ADMIN — Medication 5 MILLIGRAM(S): at 21:17

## 2020-02-23 RX ADMIN — Medication 2: at 07:49

## 2020-02-23 RX ADMIN — Medication 2: at 17:50

## 2020-02-23 RX ADMIN — Medication 1 SPRAY(S): at 18:07

## 2020-02-23 RX ADMIN — APIXABAN 2.5 MILLIGRAM(S): 2.5 TABLET, FILM COATED ORAL at 05:34

## 2020-02-23 RX ADMIN — GABAPENTIN 300 MILLIGRAM(S): 400 CAPSULE ORAL at 18:08

## 2020-02-23 RX ADMIN — ATORVASTATIN CALCIUM 10 MILLIGRAM(S): 80 TABLET, FILM COATED ORAL at 21:14

## 2020-02-23 RX ADMIN — Medication 2: at 12:34

## 2020-02-23 RX ADMIN — Medication 1 SPRAY(S): at 05:34

## 2020-02-23 RX ADMIN — OXYCODONE HYDROCHLORIDE 10 MILLIGRAM(S): 5 TABLET ORAL at 09:55

## 2020-02-23 RX ADMIN — OXYCODONE HYDROCHLORIDE 10 MILLIGRAM(S): 5 TABLET ORAL at 01:41

## 2020-02-23 RX ADMIN — Medication 3 MILLILITER(S): at 18:07

## 2020-02-23 RX ADMIN — Medication 40 MILLIGRAM(S): at 18:07

## 2020-02-23 RX ADMIN — Medication 40 MILLIGRAM(S): at 05:34

## 2020-02-23 RX ADMIN — SPIRONOLACTONE 12.5 MILLIGRAM(S): 25 TABLET, FILM COATED ORAL at 05:34

## 2020-02-23 RX ADMIN — OXYCODONE HYDROCHLORIDE 10 MILLIGRAM(S): 5 TABLET ORAL at 18:13

## 2020-02-23 RX ADMIN — Medication 3 MILLILITER(S): at 05:34

## 2020-02-23 RX ADMIN — ISOSORBIDE MONONITRATE 60 MILLIGRAM(S): 60 TABLET, EXTENDED RELEASE ORAL at 11:17

## 2020-02-23 RX ADMIN — OXYCODONE HYDROCHLORIDE 10 MILLIGRAM(S): 5 TABLET ORAL at 18:44

## 2020-02-23 RX ADMIN — GABAPENTIN 300 MILLIGRAM(S): 400 CAPSULE ORAL at 05:34

## 2020-02-23 RX ADMIN — APIXABAN 2.5 MILLIGRAM(S): 2.5 TABLET, FILM COATED ORAL at 18:07

## 2020-02-23 RX ADMIN — POLYETHYLENE GLYCOL 3350 17 GRAM(S): 17 POWDER, FOR SOLUTION ORAL at 18:07

## 2020-02-23 RX ADMIN — OXYCODONE HYDROCHLORIDE 10 MILLIGRAM(S): 5 TABLET ORAL at 10:19

## 2020-02-23 RX ADMIN — Medication 100 MILLIGRAM(S): at 18:07

## 2020-02-23 RX ADMIN — SENNA PLUS 2 TABLET(S): 8.6 TABLET ORAL at 21:14

## 2020-02-23 RX ADMIN — Medication 1 SPRAY(S): at 18:06

## 2020-02-23 NOTE — PROGRESS NOTE ADULT - ATTENDING COMMENTS
Patient seen and examined.  Agree with resident note.  Meds, labs and vitals all reviewed.  Patient with RUE rash today.  Lidoderm discontinued. Monitor.  Monitor for gout exacerbation, given that Colchicine is discontinued (given uptick in creatinine).  May require short course of steroids.

## 2020-02-23 NOTE — PROGRESS NOTE ADULT - PROBLEM SELECTOR PLAN 4
Baseline serum Cr 1.8-2.0, serum Cr rising to 2.3 this AM, with mild hyponatremia. In setting of more aggressive diuresis, and with likely contraction alkalosis, so suspect 2/2 overdiuresis.  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 DDx includes septic joint however less likely as patient otherwise non-toxic appearing, no effusion felt.  -Right wrist x-rays show arthritis changes, no fracture  -Colchicine discontinued given renal function and worse clearance. Lidocaine patch to wrist however unsure whether it was contributory to a contact dermatitis. Patient has new erythema overlying area will need further monitoring , standing tylenol  -serum urea level 8.9  - Patient not on current steroids, will reconsider if pain worsens

## 2020-02-23 NOTE — PROGRESS NOTE ADULT - PROBLEM SELECTOR PLAN 7
A1C 6.7 in 12/2019 on home glimperide and januvia  -ISS while inpatient  -FS qhs, qac  -FS more elevated overnight, likely in setting of one-time dose of steroid yesterday. Continue to monitor.  Chronic hip pain  on home oxycontin 10TID and gralise 600qd  -oxycodone 10 TID and gabapentin 300 BID 02/2019 on eliquis, duplex from 12/2019 showed no DVT  -continue eliquis for now and outpt f/u regarding when to stop

## 2020-02-23 NOTE — PROGRESS NOTE ADULT - PROBLEM SELECTOR PLAN 9
-c/w home dose hydralazine    Hyperlipidema  -c/w home atorvastatin -DVT: on eliquis  -Diet: consistent carb/DASH  -Dispo: PT eval--home, no skilled PT needed.    Discharge planning  Transitions of Care Status:  1.  Name of PCP: Dr. Dawkins  2.  PCP Contacted on Admission: [X] Y    [ ] N    3.  PCP contacted at Discharge: [ ] Y    [ ] N    [ ] N/A  4.  Post-Discharge Appointment Date and Location:  5.  Summary of Handoff given to PCP:

## 2020-02-23 NOTE — PROGRESS NOTE ADULT - ASSESSMENT
81 y/o Indonesian speaking F PMHx of HFpEF (EF 60-65%, stage II diastolic dysfunction), CAD (s/p NST showing areas of infarct, no prior caths), CKD (Stage III), Breast cancer (s/p R mastectomy in 1989, c/b chronic RUE lymphedema, rectal cancer (s/p reversed ileostomy in 2015), RLE DVT (2/2019, on Eliquis), chronic R hip pain, T2DM (A1C 6.7 in 12/2019), HTN, HLD, s/p knee replacement 2017, recent admission in December for RLE swelling and redness treated for CHF exacerbation presented to the ED with SOB and chest pain, admitted for likely CHF exacerbation now diuresed and euvolemic. Course c/b TAO on CKD, new right wrist pain likely 2/2 gout flare.

## 2020-02-23 NOTE — PROGRESS NOTE ADULT - PROBLEM SELECTOR PLAN 6
likely atypical CP, resolved spontaneously, neg trops  -EKG no change from prior  -continue home isosorbide mononitrate A1C 6.7 in 12/2019 on home glimperide and januvia  -ISS while inpatient  -FS qhs, qac  -FS more elevated overnight, likely in setting of one-time dose of steroid yesterday. Continue to monitor.  Chronic hip pain  on home oxycontin 10TID and gralise 600qd  -oxycodone 10 TID and gabapentin 300 BID

## 2020-02-23 NOTE — PROGRESS NOTE ADULT - PROBLEM SELECTOR PLAN 3
Increased erythema on RLE for the past 3 days, more tender than LLE. Duplex from 12/2019 neg for DVT. Cellulitis vs chronic venous stasis in the setting of CHF vs lymphedema. Lower extremities swelling/erythema now improved.  -monitor off antibiotics given leukocytosis resolved, afebrile  -lasix as above  -continue home pain meds oxycontin 10 TID and gabapentin 300 BID (home gralise 600qd) for leg pain Patient initially with elevate dProBMp, b/l bibasilar crackles s/p diuresis   Patient weaned to  Room Air comfortable , s/p 1 mg  bumex BID , Now lasix 40 mg po BID Trop neg. CXR clear, RVP neg. Repeat CXR with clear lungs. Encourage incentive spirometry.  -strict I&Os. Continue home lopressor 25, spironolactone 12.5 qd  -Dr. Hagan contacted via email  -symptomatic support with saline spray and duoneb

## 2020-02-23 NOTE — PROGRESS NOTE ADULT - PROBLEM SELECTOR PLAN 8
02/2019 on eliquis, duplex from 12/2019 showed no DVT  -continue eliquis for now and outpt f/u regarding when to stop -c/w home dose hydralazine  Hyperlipidema  -c/w home atorvastatin

## 2020-02-23 NOTE — PROGRESS NOTE ADULT - SUBJECTIVE AND OBJECTIVE BOX
Patient is a 80y old  Female who presents with a chief complaint of SOB (22 Feb 2020 07:07)      SUBJECTIVE / OVERNIGHT EVENTS: Patient seen and examined at bedside.     REVIEW OF SYSTEMS:  CONSTITUTIONAL: No weakness, fevers or chills  EYES/ENT: No visual changes;  No vertigo or throat pain   NECK: No pain or stiffness  RESPIRATORY: No cough, wheezing, hemoptysis; No shortness of breath  CARDIOVASCULAR: No chest pain or palpitations  GASTROINTESTINAL: No abdominal pain, nausea, vomiting, or diarrhea. No melena or hematochezia.  GENITOURINARY: No dysuria, frequency or hematuria  NEUROLOGICAL: No numbness or weakness  SKIN: No itching, burning, rashes, or lesions   All other review of systems is negative unless indicated above.    MEDICATIONS  (STANDING):  albuterol/ipratropium for Nebulization. 3 milliLiter(s) Nebulizer two times a day  apixaban 2.5 milliGRAM(s) Oral two times a day  atorvastatin 10 milliGRAM(s) Oral at bedtime  colchicine 0.3 milliGRAM(s) Oral daily  dextrose 5%. 1000 milliLiter(s) (50 mL/Hr) IV Continuous <Continuous>  dextrose 50% Injectable 12.5 Gram(s) IV Push once  dextrose 50% Injectable 25 Gram(s) IV Push once  dextrose 50% Injectable 25 Gram(s) IV Push once  fluticasone propionate 50 MICROgram(s)/spray Nasal Spray 1 Spray(s) Both Nostrils two times a day  furosemide    Tablet 40 milliGRAM(s) Oral two times a day  gabapentin 300 milliGRAM(s) Oral two times a day  hydrALAZINE 100 milliGRAM(s) Oral two times a day  insulin lispro (HumaLOG) corrective regimen sliding scale   SubCutaneous three times a day before meals  insulin lispro (HumaLOG) corrective regimen sliding scale   SubCutaneous at bedtime  isosorbide   mononitrate ER Tablet (IMDUR) 60 milliGRAM(s) Oral daily  lidocaine   Patch 1 Patch Transdermal daily  metoprolol succinate ER 25 milliGRAM(s) Oral daily  oxyCODONE  ER Tablet 10 milliGRAM(s) Oral every 8 hours  polyethylene glycol 3350 17 Gram(s) Oral two times a day  senna 2 Tablet(s) Oral at bedtime  sodium chloride 0.65% Nasal 1 Spray(s) Both Nostrils two times a day  spironolactone 12.5 milliGRAM(s) Oral daily    MEDICATIONS  (PRN):  bisacodyl 5 milliGRAM(s) Oral every 12 hours PRN Constipation  dextrose 40% Gel 15 Gram(s) Oral once PRN Blood Glucose LESS THAN 70 milliGRAM(s)/deciliter  glucagon  Injectable 1 milliGRAM(s) IntraMuscular once PRN Glucose LESS THAN 70 milligrams/deciliter      T(C): 36.9 (02-23-20 @ 04:47), Max: 36.9 (02-23-20 @ 04:47)  HR: 67 (02-23-20 @ 04:47) (67 - 70)  BP: 120/54 (02-23-20 @ 04:47) (114/65 - 132/73)  RR: 18 (02-23-20 @ 04:47) (18 - 18)  SpO2: 95% (02-23-20 @ 04:47) (95% - 98%)    CAPILLARY BLOOD GLUCOSE      POCT Blood Glucose.: 185 mg/dL (22 Feb 2020 21:30)  POCT Blood Glucose.: 366 mg/dL (22 Feb 2020 17:10)  POCT Blood Glucose.: 243 mg/dL (22 Feb 2020 12:20)  POCT Blood Glucose.: 245 mg/dL (22 Feb 2020 08:00)    I&O's Summary    21 Feb 2020 07:01  -  22 Feb 2020 07:00  --------------------------------------------------------  IN: 600 mL / OUT: 1350 mL / NET: -750 mL    22 Feb 2020 07:01  -  23 Feb 2020 06:20  --------------------------------------------------------  IN: 1080 mL / OUT: 750 mL / NET: 330 mL        GENERAL: No acute distress, well-developed  HEAD:  Atraumatic, Normocephalic  ENT: EOMI, PERRLA, No JVD, moist mucosa  CHEST/LUNG: Clear to auscultation bilaterally  BACK: No spinal tenderness  HEART: Regular rate and rhythm; No murmurs, rubs, or gallops  ABDOMEN: Soft, Nontender, Nondistended; Bowel sounds present  EXTREMITIES:  No clubbing, cyanosis, or edema  PSYCH: Nl behavior, nl affect  NEUROLOGY: AAOx3, non-focal, cranial nerves intact      LABS:                        11.3   7.75  )-----------( 250      ( 21 Feb 2020 07:01 )             37.7     02-21    136  |  92<L>  |  52<H>  ----------------------------<  173<H>  4.3   |  28  |  1.90<H>    Ca    9.5      21 Feb 2020 06:59  Phos  3.7     02-21  Mg     2.4     02-21              RADIOLOGY & ADDITIONAL TESTS:  Imaging Personally Reviewed:  Consultant(s) Notes Reviewed:    Care Discussed with Consultants/Other Providers: Patient is a 80y old  Female who presents with a chief complaint of SOB (22 Feb 2020 07:07)      SUBJECTIVE / OVERNIGHT EVENTS: Patient seen and examined at bedside. No acute events overnight. Patient says her pain is 7/10 in which is improved from yesterday . She notes that her knee pain is bothersome, going into her hip region. She also notes a new rash seen on the dorsum of hand extending all the way up the arm towards the elbow . Rash is warm to touch but not painful. Arm feels the same as she typically does. Patient has worsened TAO from yesterday, pharmacy consulted and colchicine should be discontinued given concern for accumulation and clearance of medication.     REVIEW OF SYSTEMS:  CONSTITUTIONAL: No weakness, fevers or chills  EYES/ENT: No visual changes;  No vertigo or throat pain   NECK: No pain or stiffness  RESPIRATORY: No cough, wheezing, hemoptysis; No shortness of breath  CARDIOVASCULAR: No chest pain or palpitations  GASTROINTESTINAL: No abdominal pain, nausea, vomiting, or diarrhea. No melena or hematochezia.  GENITOURINARY: No dysuria, frequency or hematuria  NEUROLOGICAL: No numbness or weakness  SKIN: No itching, burning, rashes, or lesions   All other review of systems is negative unless indicated above.    MEDICATIONS  (STANDING):  albuterol/ipratropium for Nebulization. 3 milliLiter(s) Nebulizer two times a day  apixaban 2.5 milliGRAM(s) Oral two times a day  atorvastatin 10 milliGRAM(s) Oral at bedtime  colchicine 0.3 milliGRAM(s) Oral daily  dextrose 5%. 1000 milliLiter(s) (50 mL/Hr) IV Continuous <Continuous>  dextrose 50% Injectable 12.5 Gram(s) IV Push once  dextrose 50% Injectable 25 Gram(s) IV Push once  dextrose 50% Injectable 25 Gram(s) IV Push once  fluticasone propionate 50 MICROgram(s)/spray Nasal Spray 1 Spray(s) Both Nostrils two times a day  furosemide    Tablet 40 milliGRAM(s) Oral two times a day  gabapentin 300 milliGRAM(s) Oral two times a day  hydrALAZINE 100 milliGRAM(s) Oral two times a day  insulin lispro (HumaLOG) corrective regimen sliding scale   SubCutaneous three times a day before meals  insulin lispro (HumaLOG) corrective regimen sliding scale   SubCutaneous at bedtime  isosorbide   mononitrate ER Tablet (IMDUR) 60 milliGRAM(s) Oral daily  lidocaine   Patch 1 Patch Transdermal daily  metoprolol succinate ER 25 milliGRAM(s) Oral daily  oxyCODONE  ER Tablet 10 milliGRAM(s) Oral every 8 hours  polyethylene glycol 3350 17 Gram(s) Oral two times a day  senna 2 Tablet(s) Oral at bedtime  sodium chloride 0.65% Nasal 1 Spray(s) Both Nostrils two times a day  spironolactone 12.5 milliGRAM(s) Oral daily    MEDICATIONS  (PRN):  bisacodyl 5 milliGRAM(s) Oral every 12 hours PRN Constipation  dextrose 40% Gel 15 Gram(s) Oral once PRN Blood Glucose LESS THAN 70 milliGRAM(s)/deciliter  glucagon  Injectable 1 milliGRAM(s) IntraMuscular once PRN Glucose LESS THAN 70 milligrams/deciliter      T(C): 36.9 (02-23-20 @ 04:47), Max: 36.9 (02-23-20 @ 04:47)  HR: 67 (02-23-20 @ 04:47) (67 - 70)  BP: 120/54 (02-23-20 @ 04:47) (114/65 - 132/73)  RR: 18 (02-23-20 @ 04:47) (18 - 18)  SpO2: 95% (02-23-20 @ 04:47) (95% - 98%)    CAPILLARY BLOOD GLUCOSE      POCT Blood Glucose.: 185 mg/dL (22 Feb 2020 21:30)  POCT Blood Glucose.: 366 mg/dL (22 Feb 2020 17:10)  POCT Blood Glucose.: 243 mg/dL (22 Feb 2020 12:20)  POCT Blood Glucose.: 245 mg/dL (22 Feb 2020 08:00)    I&O's Summary    21 Feb 2020 07:01  -  22 Feb 2020 07:00  --------------------------------------------------------  IN: 600 mL / OUT: 1350 mL / NET: -750 mL    22 Feb 2020 07:01  -  23 Feb 2020 06:20  --------------------------------------------------------  IN: 1080 mL / OUT: 750 mL / NET: 330 mL    GENERAL: No acute distress, well-developed  HEAD:  Atraumatic, Normocephalic  ENT: EOMI, PERRLA, No JVD, moist mucosa  CHEST/LUNG: Clear to auscultation bilaterally  BACK: No spinal tenderness  HEART: Regular rate and rhythm; No murmurs, rubs, or gallops  ABDOMEN: Soft, Nontender, Nondistended; Bowel sounds present  EXTREMITIES:  No clubbing, cyanosis, or edema  PSYCH: Nl behavior, nl affect  NEUROLOGY: AAOx3, non-focal, cranial nerves intact  SKIN: warm, erythema overlying right dorsum extending to elbow, non tender non pruritic in nature, flat     LABS:                        11.3   7.75  )-----------( 250      ( 21 Feb 2020 07:01 )             37.7     02-21    136  |  92<L>  |  52<H>  ----------------------------<  173<H>  4.3   |  28  |  1.90<H>    Ca    9.5      21 Feb 2020 06:59  Phos  3.7     02-21  Mg     2.4     02-21    RADIOLOGY & ADDITIONAL TESTS:  Imaging Personally Reviewed:  Consultant(s) Notes Reviewed:    Care Discussed with Consultants/Other Providers: Patient is a 80y old  Female who presents with a chief complaint of SOB (22 Feb 2020 07:07)      SUBJECTIVE / OVERNIGHT EVENTS: Patient seen and examined at bedside. No acute events overnight. Patient says her pain is 7/10 in which is improved from yesterday . She notes that her knee pain is bothersome, going into her hip region. She also notes a new rash seen on the dorsum of hand extending all the way up the arm towards the elbow . Rash is warm to touch but not painful. Arm feels the same as she typically does. Patient has worsened TAO from yesterday, pharmacy consulted and colchicine should be discontinued given concern for accumulation and clearance of medication.     REVIEW OF SYSTEMS:  CONSTITUTIONAL: No weakness, fevers or chills  EYES/ENT: No visual changes;  No vertigo or throat pain   NECK: No pain or stiffness  RESPIRATORY: No cough, wheezing, hemoptysis; No shortness of breath  CARDIOVASCULAR: No chest pain or palpitations  GASTROINTESTINAL: No abdominal pain, nausea, vomiting, or diarrhea. No melena or hematochezia.  GENITOURINARY: No dysuria, frequency or hematuria  NEUROLOGICAL: No numbness or weakness  SKIN: No itching, burning, rashes, or lesions   All other review of systems is negative unless indicated above.    MEDICATIONS  (STANDING):  albuterol/ipratropium for Nebulization. 3 milliLiter(s) Nebulizer two times a day  apixaban 2.5 milliGRAM(s) Oral two times a day  atorvastatin 10 milliGRAM(s) Oral at bedtime  colchicine 0.3 milliGRAM(s) Oral daily  dextrose 5%. 1000 milliLiter(s) (50 mL/Hr) IV Continuous <Continuous>  dextrose 50% Injectable 12.5 Gram(s) IV Push once  dextrose 50% Injectable 25 Gram(s) IV Push once  dextrose 50% Injectable 25 Gram(s) IV Push once  fluticasone propionate 50 MICROgram(s)/spray Nasal Spray 1 Spray(s) Both Nostrils two times a day  furosemide    Tablet 40 milliGRAM(s) Oral two times a day  gabapentin 300 milliGRAM(s) Oral two times a day  hydrALAZINE 100 milliGRAM(s) Oral two times a day  insulin lispro (HumaLOG) corrective regimen sliding scale   SubCutaneous three times a day before meals  insulin lispro (HumaLOG) corrective regimen sliding scale   SubCutaneous at bedtime  isosorbide   mononitrate ER Tablet (IMDUR) 60 milliGRAM(s) Oral daily  lidocaine   Patch 1 Patch Transdermal daily  metoprolol succinate ER 25 milliGRAM(s) Oral daily  oxyCODONE  ER Tablet 10 milliGRAM(s) Oral every 8 hours  polyethylene glycol 3350 17 Gram(s) Oral two times a day  senna 2 Tablet(s) Oral at bedtime  sodium chloride 0.65% Nasal 1 Spray(s) Both Nostrils two times a day  spironolactone 12.5 milliGRAM(s) Oral daily    MEDICATIONS  (PRN):  bisacodyl 5 milliGRAM(s) Oral every 12 hours PRN Constipation  dextrose 40% Gel 15 Gram(s) Oral once PRN Blood Glucose LESS THAN 70 milliGRAM(s)/deciliter  glucagon  Injectable 1 milliGRAM(s) IntraMuscular once PRN Glucose LESS THAN 70 milligrams/deciliter      T(C): 36.9 (02-23-20 @ 04:47), Max: 36.9 (02-23-20 @ 04:47)  HR: 67 (02-23-20 @ 04:47) (67 - 70)  BP: 120/54 (02-23-20 @ 04:47) (114/65 - 132/73)  RR: 18 (02-23-20 @ 04:47) (18 - 18)  SpO2: 95% (02-23-20 @ 04:47) (95% - 98%)    CAPILLARY BLOOD GLUCOSE      POCT Blood Glucose.: 185 mg/dL (22 Feb 2020 21:30)  POCT Blood Glucose.: 366 mg/dL (22 Feb 2020 17:10)  POCT Blood Glucose.: 243 mg/dL (22 Feb 2020 12:20)  POCT Blood Glucose.: 245 mg/dL (22 Feb 2020 08:00)    I&O's Summary    21 Feb 2020 07:01  -  22 Feb 2020 07:00  --------------------------------------------------------  IN: 600 mL / OUT: 1350 mL / NET: -750 mL    22 Feb 2020 07:01  -  23 Feb 2020 06:20  --------------------------------------------------------  IN: 1080 mL / OUT: 750 mL / NET: 330 mL    GENERAL: No acute distress, well-developed  HEAD:  Atraumatic, Normocephalic  ENT: EOMI, PERRLA, No JVD, moist mucosa  CHEST/LUNG: Clear to auscultation bilaterally  BACK: No spinal tenderness  HEART: Regular rate and rhythm; No murmurs, rubs, or gallops  ABDOMEN: Soft, Nontender, Nondistended; Bowel sounds present  EXTREMITIES:  No clubbing, cyanosis, 1+ edema B/l   PSYCH: Nl behavior, nl affect  NEUROLOGY: AAOx3, non-focal, cranial nerves intact  SKIN: warm, erythema overlying right dorsum extending to elbow, non tender non pruritic in nature, Rash flat and not well demarcated . Patient withe evidence of stasis dermatitis on RLE    LABS:                        11.3   7.75  )-----------( 250      ( 21 Feb 2020 07:01 )             37.7     02-21    136  |  92<L>  |  52<H>  ----------------------------<  173<H>  4.3   |  28  |  1.90<H>    Ca    9.5      21 Feb 2020 06:59  Phos  3.7     02-21  Mg     2.4     02-21    RADIOLOGY & ADDITIONAL TESTS:  Imaging Personally Reviewed:  Consultant(s) Notes Reviewed:    Care Discussed with Consultants/Other Providers:

## 2020-02-23 NOTE — PROGRESS NOTE ADULT - PROBLEM SELECTOR PLAN 1
hypoxic to 89% at outpt clinic, SOB today, b/l basilar crackles, significant peripheral edema on home bumex 1mg BID. ProBNP 1072. Trop neg. CXR clear, RVP neg.  -Now weaned to room air  -Repeat CXR with clear lungs. Encourage incentive spirometry.  -Lasix 40mg PO BID  -strict I&Os  -continue home lopressor 25, spironolactone 12.5 qd  -Dr. Hagan contacted via email  -symptomatic support with saline spray and duoneb Worsened TAO on CKD, Serum creatinine now 2.24 from 1.9. Colchicine discontinued given reduced creatinine clearance to 19, discussed with pharmacy. Patient net negative 770 CC will continue with strict I and O's renally dose all meds and avoid nephrotoxic agents. Do not suspect overdiuresis being contributory to patient's renal function as patients renal function stable on current dosing of lasix. Will continue to monitor  - Repeat BMP in the AM

## 2020-02-23 NOTE — PROGRESS NOTE ADULT - PROBLEM SELECTOR PLAN 2
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 DDx includes septic joint however less likely as patient otherwise non-toxic appearing, no effusion felt.  -Right wrist x-rays show arthritis changes, no fracture  -Colchicine 0.3mg daily, Lidocaine patch to wrist, standing tylenol  -serum urea level 8.9  -s/p Prednisone 20mg PO x 1 on 2/22 Patient with new erythema overlying right UPE  Etiology could be contact dermatitis vs cellulitis   Rash not well demarcated occurred after lidocaine patch place however extends up to arm past the width of the lidocaine patch. Non pruritic, warm to touch. Will continue to monitor off of abx given absence of leukocytosis.   monitor fever curve , low threshhold for abx use given lymphedema hx   Patients appears to have stasis dermatitis in RLE which is tender to touch Patient with new erythema overlying right UPE  Etiology could be contact dermatitis vs cellulitis   Rash not well demarcated occurred after lidocaine patch place however extends up to arm past the width of the lidocaine patch. Non pruritic, warm to touch. Will continue to monitor off of abx given absence of leukocytosis.   monitor fever curve , low threshhold for abx use given lymphedema hx   Patients appears to have stasis dermatitis in RLE which is tender to touch.

## 2020-02-24 VITALS — WEIGHT: 195.99 LBS

## 2020-02-24 LAB
ALBUMIN SERPL ELPH-MCNC: 3.4 G/DL — SIGNIFICANT CHANGE UP (ref 3.3–5)
ALP SERPL-CCNC: 60 U/L — SIGNIFICANT CHANGE UP (ref 40–120)
ALT FLD-CCNC: 14 U/L — SIGNIFICANT CHANGE UP (ref 10–45)
ANION GAP SERPL CALC-SCNC: 16 MMOL/L — SIGNIFICANT CHANGE UP (ref 5–17)
AST SERPL-CCNC: 29 U/L — SIGNIFICANT CHANGE UP (ref 10–40)
BILIRUB SERPL-MCNC: 0.2 MG/DL — SIGNIFICANT CHANGE UP (ref 0.2–1.2)
BUN SERPL-MCNC: 68 MG/DL — HIGH (ref 7–23)
CALCIUM SERPL-MCNC: 9.3 MG/DL — SIGNIFICANT CHANGE UP (ref 8.4–10.5)
CHLORIDE SERPL-SCNC: 91 MMOL/L — LOW (ref 96–108)
CO2 SERPL-SCNC: 26 MMOL/L — SIGNIFICANT CHANGE UP (ref 22–31)
CREAT SERPL-MCNC: 1.91 MG/DL — HIGH (ref 0.5–1.3)
GLUCOSE BLDC GLUCOMTR-MCNC: 181 MG/DL — HIGH (ref 70–99)
GLUCOSE SERPL-MCNC: 188 MG/DL — HIGH (ref 70–99)
HCT VFR BLD CALC: 37.5 % — SIGNIFICANT CHANGE UP (ref 34.5–45)
HGB BLD-MCNC: 11.3 G/DL — LOW (ref 11.5–15.5)
MAGNESIUM SERPL-MCNC: 2.2 MG/DL — SIGNIFICANT CHANGE UP (ref 1.6–2.6)
MCHC RBC-ENTMCNC: 29 PG — SIGNIFICANT CHANGE UP (ref 27–34)
MCHC RBC-ENTMCNC: 30.1 GM/DL — LOW (ref 32–36)
MCV RBC AUTO: 96.4 FL — SIGNIFICANT CHANGE UP (ref 80–100)
NRBC # BLD: 0 /100 WBCS — SIGNIFICANT CHANGE UP (ref 0–0)
PHOSPHATE SERPL-MCNC: 4.6 MG/DL — HIGH (ref 2.5–4.5)
PLATELET # BLD AUTO: 346 K/UL — SIGNIFICANT CHANGE UP (ref 150–400)
POTASSIUM SERPL-MCNC: 5 MMOL/L — SIGNIFICANT CHANGE UP (ref 3.5–5.3)
POTASSIUM SERPL-SCNC: 5 MMOL/L — SIGNIFICANT CHANGE UP (ref 3.5–5.3)
PROT SERPL-MCNC: 7 G/DL — SIGNIFICANT CHANGE UP (ref 6–8.3)
RBC # BLD: 3.89 M/UL — SIGNIFICANT CHANGE UP (ref 3.8–5.2)
RBC # FLD: 14.7 % — HIGH (ref 10.3–14.5)
SODIUM SERPL-SCNC: 133 MMOL/L — LOW (ref 135–145)
WBC # BLD: 6.96 K/UL — SIGNIFICANT CHANGE UP (ref 3.8–10.5)
WBC # FLD AUTO: 6.96 K/UL — SIGNIFICANT CHANGE UP (ref 3.8–10.5)

## 2020-02-24 PROCEDURE — 93005 ELECTROCARDIOGRAM TRACING: CPT

## 2020-02-24 PROCEDURE — 84295 ASSAY OF SERUM SODIUM: CPT

## 2020-02-24 PROCEDURE — 82962 GLUCOSE BLOOD TEST: CPT

## 2020-02-24 PROCEDURE — 87633 RESP VIRUS 12-25 TARGETS: CPT

## 2020-02-24 PROCEDURE — 87186 SC STD MICRODIL/AGAR DIL: CPT

## 2020-02-24 PROCEDURE — 82435 ASSAY OF BLOOD CHLORIDE: CPT

## 2020-02-24 PROCEDURE — 83935 ASSAY OF URINE OSMOLALITY: CPT

## 2020-02-24 PROCEDURE — 82570 ASSAY OF URINE CREATININE: CPT

## 2020-02-24 PROCEDURE — 84540 ASSAY OF URINE/UREA-N: CPT

## 2020-02-24 PROCEDURE — 87086 URINE CULTURE/COLONY COUNT: CPT

## 2020-02-24 PROCEDURE — 82565 ASSAY OF CREATININE: CPT

## 2020-02-24 PROCEDURE — 83880 ASSAY OF NATRIURETIC PEPTIDE: CPT

## 2020-02-24 PROCEDURE — 81001 URINALYSIS AUTO W/SCOPE: CPT

## 2020-02-24 PROCEDURE — 82803 BLOOD GASES ANY COMBINATION: CPT

## 2020-02-24 PROCEDURE — 84484 ASSAY OF TROPONIN QUANT: CPT

## 2020-02-24 PROCEDURE — 84132 ASSAY OF SERUM POTASSIUM: CPT

## 2020-02-24 PROCEDURE — 73110 X-RAY EXAM OF WRIST: CPT

## 2020-02-24 PROCEDURE — 87581 M.PNEUMON DNA AMP PROBE: CPT

## 2020-02-24 PROCEDURE — 83690 ASSAY OF LIPASE: CPT

## 2020-02-24 PROCEDURE — 82330 ASSAY OF CALCIUM: CPT

## 2020-02-24 PROCEDURE — 80048 BASIC METABOLIC PNL TOTAL CA: CPT

## 2020-02-24 PROCEDURE — 83036 HEMOGLOBIN GLYCOSYLATED A1C: CPT

## 2020-02-24 PROCEDURE — 99239 HOSP IP/OBS DSCHRG MGMT >30: CPT

## 2020-02-24 PROCEDURE — 87486 CHLMYD PNEUM DNA AMP PROBE: CPT

## 2020-02-24 PROCEDURE — 99285 EMERGENCY DEPT VISIT HI MDM: CPT | Mod: 25

## 2020-02-24 PROCEDURE — 71045 X-RAY EXAM CHEST 1 VIEW: CPT

## 2020-02-24 PROCEDURE — 82947 ASSAY GLUCOSE BLOOD QUANT: CPT

## 2020-02-24 PROCEDURE — 80053 COMPREHEN METABOLIC PANEL: CPT

## 2020-02-24 PROCEDURE — 97161 PT EVAL LOW COMPLEX 20 MIN: CPT

## 2020-02-24 PROCEDURE — 94640 AIRWAY INHALATION TREATMENT: CPT

## 2020-02-24 PROCEDURE — 85027 COMPLETE CBC AUTOMATED: CPT

## 2020-02-24 PROCEDURE — 87798 DETECT AGENT NOS DNA AMP: CPT

## 2020-02-24 PROCEDURE — 84300 ASSAY OF URINE SODIUM: CPT

## 2020-02-24 PROCEDURE — 84550 ASSAY OF BLOOD/URIC ACID: CPT

## 2020-02-24 PROCEDURE — 96374 THER/PROPH/DIAG INJ IV PUSH: CPT

## 2020-02-24 PROCEDURE — 83605 ASSAY OF LACTIC ACID: CPT

## 2020-02-24 PROCEDURE — 83735 ASSAY OF MAGNESIUM: CPT

## 2020-02-24 PROCEDURE — 85014 HEMATOCRIT: CPT

## 2020-02-24 PROCEDURE — 84100 ASSAY OF PHOSPHORUS: CPT

## 2020-02-24 PROCEDURE — 97116 GAIT TRAINING THERAPY: CPT

## 2020-02-24 RX ADMIN — OXYCODONE HYDROCHLORIDE 10 MILLIGRAM(S): 5 TABLET ORAL at 07:56

## 2020-02-24 RX ADMIN — OXYCODONE HYDROCHLORIDE 10 MILLIGRAM(S): 5 TABLET ORAL at 05:19

## 2020-02-24 RX ADMIN — Medication 100 MILLIGRAM(S): at 05:19

## 2020-02-24 RX ADMIN — POLYETHYLENE GLYCOL 3350 17 GRAM(S): 17 POWDER, FOR SOLUTION ORAL at 05:19

## 2020-02-24 RX ADMIN — GABAPENTIN 300 MILLIGRAM(S): 400 CAPSULE ORAL at 05:19

## 2020-02-24 RX ADMIN — Medication 25 MILLIGRAM(S): at 05:19

## 2020-02-24 RX ADMIN — Medication 1: at 07:57

## 2020-02-24 RX ADMIN — Medication 40 MILLIGRAM(S): at 05:19

## 2020-02-24 RX ADMIN — Medication 3 MILLILITER(S): at 05:19

## 2020-02-24 RX ADMIN — Medication 1 SPRAY(S): at 05:19

## 2020-02-24 RX ADMIN — OXYCODONE HYDROCHLORIDE 10 MILLIGRAM(S): 5 TABLET ORAL at 11:30

## 2020-02-24 RX ADMIN — SPIRONOLACTONE 12.5 MILLIGRAM(S): 25 TABLET, FILM COATED ORAL at 05:18

## 2020-02-24 RX ADMIN — APIXABAN 2.5 MILLIGRAM(S): 2.5 TABLET, FILM COATED ORAL at 05:18

## 2020-02-24 RX ADMIN — ISOSORBIDE MONONITRATE 60 MILLIGRAM(S): 60 TABLET, EXTENDED RELEASE ORAL at 11:07

## 2020-02-24 NOTE — PROGRESS NOTE ADULT - PROBLEM SELECTOR PLAN 9
-DVT: on eliquis  -Diet: consistent carb/DASH  -Dispo: PT eval--home, no skilled PT needed.    Discharge planning  Transitions of Care Status:  1.  Name of PCP: Dr. Dawkins  2.  PCP Contacted on Admission: [X] Y    [ ] N    3.  PCP contacted at Discharge: [ ] Y    [ ] N    [ ] N/A  4.  Post-Discharge Appointment Date and Location:  5.  Summary of Handoff given to PCP: -DVT: on eliquis  -Diet: consistent carb/DASH  -Dispo: PT eval--home, no skilled PT needed.    Discharge planning  Transitions of Care Status:  1.  Name of PCP: Dr. Dawkins  2.  PCP Contacted on Admission: [X] Y    [ ] N    3.  PCP contacted at Discharge: [ x] Y    [ ] N    [ ] N/A  4.  Post-Discharge Appointment Date and Location:  5.  Summary of Handoff given to PCP: Email sent during admission and at discharge.

## 2020-02-24 NOTE — PROGRESS NOTE ADULT - SUBJECTIVE AND OBJECTIVE BOX
Patient is a 80y old  Female who presents with a chief complaint of SOB (23 Feb 2020 06:20)      SUBJECTIVE / OVERNIGHT EVENTS: No overnight events. Patient seen and examined at bedside. Patient reports improvement in R wrist pain, no longer TTP. Denies fevers, shakes, chills. Endorses ROM improved. No complaints this AM. Patient denies CP, SOB.      MEDICATIONS  (STANDING):  albuterol/ipratropium for Nebulization. 3 milliLiter(s) Nebulizer two times a day  apixaban 2.5 milliGRAM(s) Oral two times a day  atorvastatin 10 milliGRAM(s) Oral at bedtime  dextrose 5%. 1000 milliLiter(s) (50 mL/Hr) IV Continuous <Continuous>  dextrose 50% Injectable 12.5 Gram(s) IV Push once  dextrose 50% Injectable 25 Gram(s) IV Push once  dextrose 50% Injectable 25 Gram(s) IV Push once  fluticasone propionate 50 MICROgram(s)/spray Nasal Spray 1 Spray(s) Both Nostrils two times a day  furosemide    Tablet 40 milliGRAM(s) Oral two times a day  gabapentin 300 milliGRAM(s) Oral two times a day  hydrALAZINE 100 milliGRAM(s) Oral two times a day  insulin lispro (HumaLOG) corrective regimen sliding scale   SubCutaneous three times a day before meals  insulin lispro (HumaLOG) corrective regimen sliding scale   SubCutaneous at bedtime  isosorbide   mononitrate ER Tablet (IMDUR) 60 milliGRAM(s) Oral daily  metoprolol succinate ER 25 milliGRAM(s) Oral daily  oxyCODONE  ER Tablet 10 milliGRAM(s) Oral every 8 hours  polyethylene glycol 3350 17 Gram(s) Oral two times a day  senna 2 Tablet(s) Oral at bedtime  sodium chloride 0.65% Nasal 1 Spray(s) Both Nostrils two times a day  spironolactone 12.5 milliGRAM(s) Oral daily    MEDICATIONS  (PRN):  bisacodyl 5 milliGRAM(s) Oral every 12 hours PRN Constipation  dextrose 40% Gel 15 Gram(s) Oral once PRN Blood Glucose LESS THAN 70 milliGRAM(s)/deciliter  glucagon  Injectable 1 milliGRAM(s) IntraMuscular once PRN Glucose LESS THAN 70 milligrams/deciliter      T(C): 36.3 (02-24-20 @ 04:42), Max: 36.9 (02-23-20 @ 20:22)  HR: 62 (02-24-20 @ 04:42) (62 - 78)  BP: 124/63 (02-24-20 @ 04:42) (103/64 - 130/68)  RR: 18 (02-24-20 @ 04:42) (18 - 18)  SpO2: 96% (02-24-20 @ 04:42) (94% - 96%)    PHYSICAL EXAM  GENERAL: obese female, NAD, well-developed  NEURO: AO x3  HEAD:  Atraumatic, Normocephalic  EYES: conjunctiva and sclera clear  NECK: Supple, No JVD  CHEST/LUNG: Clear to auscultation bilaterally; No wheezes, rales or rhonchi  HEART: Regular rate and rhythm; No murmurs, rubs, or gallops  ABDOMEN: Soft, Nontender, Nondistended; Bowel sounds present, no masses.  EXTREMITIES:  2+ Peripheral Pulses, No clubbing, cyanosis, or edema, nonpitting edema to RUE, no TTP in wrist, 4/5 movement in flexion and extension   SKIN: Warm, dry, in tact, no rashes or lesions  PSYCH: affect appropriate    LABS:                        11.3   6.96  )-----------( 346      ( 24 Feb 2020 06:18 )             37.5     02-24    133<L>  |  91<L>  |  68<H>  ----------------------------<  188<H>  5.0   |  26  |  1.91<H>    Ca    9.3      24 Feb 2020 06:18  Phos  4.6     02-24  Mg     2.2     02-24    TPro  7.0  /  Alb  3.4  /  TBili  0.2  /  DBili  x   /  AST  29  /  ALT  14  /  AlkPhos  60  02-24            I&O's Summary    23 Feb 2020 07:01  -  24 Feb 2020 07:00  --------------------------------------------------------  IN: 840 mL / OUT: 2600 mL / NET: -1760 mL        Zenaida Mcguire MD  Internal Medicine Resident, PGY3  Pager: [phone] / [phone]

## 2020-02-24 NOTE — PROGRESS NOTE ADULT - ASSESSMENT
81 y/o Malay speaking F PMHx of HFpEF (EF 60-65%, stage II diastolic dysfunction), CAD (s/p NST showing areas of infarct, no prior caths), CKD (Stage III), Breast cancer (s/p R mastectomy in 1989, c/b chronic RUE lymphedema, rectal cancer (s/p reversed ileostomy in 2015), RLE DVT (2/2019, on Eliquis), chronic R hip pain, T2DM (A1C 6.7 in 12/2019), HTN, HLD, s/p knee replacement 2017, recent admission in December for RLE swelling and redness treated for CHF exacerbation presented to the ED with SOB and chest pain, admitted for likely CHF exacerbation now diuresed and euvolemic. Course c/b TAO on CKD, new right wrist pain likely 2/2 gout flare.

## 2020-02-24 NOTE — PROGRESS NOTE ADULT - PROBLEM SELECTOR PLAN 6
A1C 6.7 in 12/2019 on home glimperide and januvia  -ISS while inpatient  -FS qhs, qac  -FS more elevated overnight, likely in setting of one-time dose of steroid yesterday. Continue to monitor.  Chronic hip pain  on home oxycontin 10TID and gralise 600qd  -oxycodone 10 TID and gabapentin 300 BID

## 2020-02-24 NOTE — PROGRESS NOTE ADULT - PROBLEM SELECTOR PLAN 1
-Back to baseline-  -Will continue to hold colchicine, and avoid nephrotoxic agents  -Will give 5 days of low dose prednisone for treatment of acute gout flare

## 2020-02-24 NOTE — PROGRESS NOTE ADULT - PROBLEM SELECTOR PLAN 3
Patient initially with elevate dProBMp, b/l bibasilar crackles s/p diuresis   Patient weaned to  Room Air comfortable , s/p 1 mg  bumex BID , Now lasix 40 mg po BID Trop neg. CXR clear, RVP neg. Repeat CXR with clear lungs. Encourage incentive spirometry.  -strict I&Os. Continue home lopressor 25, spironolactone 12.5 qd  -Dr. Hagan contacted via email  -symptomatic support with saline spray and duoneb

## 2020-02-24 NOTE — PROGRESS NOTE ADULT - PROBLEM SELECTOR PLAN 4
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 DDx includes septic joint however less likely as patient otherwise non-toxic appearing, no effusion felt.  -Right wrist x-rays show arthritis changes, no fracture  -Colchicine discontinued given renal function and worse clearance.  -serum urea level 8.9  - Patient not on current steroids, plan for 5 days of prednisone

## 2020-03-03 ENCOUNTER — NON-APPOINTMENT (OUTPATIENT)
Age: 80
End: 2020-03-03

## 2020-03-03 ENCOUNTER — APPOINTMENT (OUTPATIENT)
Dept: INTERNAL MEDICINE | Facility: CLINIC | Age: 80
End: 2020-03-03
Payer: MEDICARE

## 2020-03-03 ENCOUNTER — APPOINTMENT (OUTPATIENT)
Dept: CARDIOLOGY | Facility: CLINIC | Age: 80
End: 2020-03-03
Payer: MEDICARE

## 2020-03-03 VITALS
OXYGEN SATURATION: 96 % | WEIGHT: 195 LBS | HEIGHT: 57 IN | BODY MASS INDEX: 42.07 KG/M2 | DIASTOLIC BLOOD PRESSURE: 71 MMHG | HEART RATE: 78 BPM | SYSTOLIC BLOOD PRESSURE: 125 MMHG | RESPIRATION RATE: 14 BRPM | TEMPERATURE: 97.7 F

## 2020-03-03 PROCEDURE — 99214 OFFICE O/P EST MOD 30 MIN: CPT | Mod: 25

## 2020-03-03 PROCEDURE — 99214 OFFICE O/P EST MOD 30 MIN: CPT

## 2020-03-03 PROCEDURE — 93000 ELECTROCARDIOGRAM COMPLETE: CPT

## 2020-03-03 RX ORDER — OXYCODONE HYDROCHLORIDE 10 MG/1
10 TABLET, EXTENDED RELEASE ORAL
Refills: 0 | Status: DISCONTINUED | COMMUNITY
Start: 2019-01-22 | End: 2020-03-03

## 2020-03-03 RX ORDER — AZITHROMYCIN 250 MG/1
250 TABLET, FILM COATED ORAL
Qty: 1 | Refills: 0 | Status: DISCONTINUED | COMMUNITY
Start: 2020-01-16 | End: 2020-03-03

## 2020-03-03 NOTE — REVIEW OF SYSTEMS
[Joint Pain] : joint pain [Anxiety] : anxiety [Unsteady Walking] : ataxia [Depression] : depression [Negative] : Heme/Lymph [Joint Stiffness] : no joint stiffness [Joint Swelling] : no joint swelling [Muscle Pain] : no muscle pain [Muscle Weakness] : no muscle weakness [Back Pain] : no back pain [Dizziness] : no dizziness [Headache] : no headache [Fainting] : no fainting [Confusion] : no confusion [Memory Loss] : no memory loss [de-identified] : NEEDS WALKER ALWAYS

## 2020-03-03 NOTE — PHYSICAL EXAM
[Well Nourished] : well nourished [No Acute Distress] : no acute distress [Well Developed] : well developed [Well-Appearing] : well-appearing [Normal Sclera/Conjunctiva] : normal sclera/conjunctiva [PERRL] : pupils equal round and reactive to light [Normal Oropharynx] : the oropharynx was normal [Normal Outer Ear/Nose] : the outer ears and nose were normal in appearance [EOMI] : extraocular movements intact [No JVD] : no jugular venous distention [Supple] : supple [No Lymphadenopathy] : no lymphadenopathy [No Accessory Muscle Use] : no accessory muscle use [No Respiratory Distress] : no respiratory distress  [Thyroid Normal, No Nodules] : the thyroid was normal and there were no nodules present [Clear to Auscultation] : lungs were clear to auscultation bilaterally [Normal Rate] : normal rate  [Regular Rhythm] : with a regular rhythm [No Carotid Bruits] : no carotid bruits [Normal S1, S2] : normal S1 and S2 [No Murmur] : no murmur heard [No Palpable Aorta] : no palpable aorta [No Abdominal Bruit] : a ~M bruit was not heard ~T in the abdomen [Soft] : abdomen soft [Non Tender] : non-tender [Non-distended] : non-distended [No HSM] : no HSM [Normal Bowel Sounds] : normal bowel sounds [No Masses] : no abdominal mass palpated [Normal Anterior Cervical Nodes] : no anterior cervical lymphadenopathy [No CVA Tenderness] : no CVA  tenderness [Normal Posterior Cervical Nodes] : no posterior cervical lymphadenopathy [No Spinal Tenderness] : no spinal tenderness [No Rash] : no rash [Grossly Normal Strength/Tone] : grossly normal strength/tone [No Joint Swelling] : no joint swelling [Coordination Grossly Intact] : coordination grossly intact [No Focal Deficits] : no focal deficits [Normal Affect] : the affect was normal [Normal Insight/Judgement] : insight and judgment were intact [Alert and Oriented x3] : oriented to person, place, and time [de-identified] : RLE PRETIBIAL AREA WITH ERYTHEMA AND SWELLING AND SCRATCH MARKS  [de-identified] : NEEDS  WALKER TO ASSIST

## 2020-03-07 LAB
25(OH)D3 SERPL-MCNC: 61.4 NG/ML
ALBUMIN SERPL ELPH-MCNC: 4.2 G/DL
ALP BLD-CCNC: 67 U/L
ALT SERPL-CCNC: 10 U/L
ANION GAP SERPL CALC-SCNC: 17 MMOL/L
AST SERPL-CCNC: 13 U/L
BILIRUB SERPL-MCNC: 0.2 MG/DL
BUN SERPL-MCNC: 46 MG/DL
CALCIUM SERPL-MCNC: 9.5 MG/DL
CHLORIDE SERPL-SCNC: 100 MMOL/L
CO2 SERPL-SCNC: 24 MMOL/L
CREAT SERPL-MCNC: 2.13 MG/DL
ESTIMATED AVERAGE GLUCOSE: 163 MG/DL
GLUCOSE SERPL-MCNC: 144 MG/DL
GLUCOSE SERPL-MCNC: 148 MG/DL
HBA1C MFR BLD HPLC: 7.3 %
MAGNESIUM SERPL-MCNC: 2.2 MG/DL
POTASSIUM SERPL-SCNC: 3.8 MMOL/L
PROT SERPL-MCNC: 6.7 G/DL
SODIUM SERPL-SCNC: 141 MMOL/L
URATE SERPL-MCNC: 8.9 MG/DL

## 2020-03-16 LAB
BASOPHILS # BLD AUTO: 0.04 K/UL
BASOPHILS NFR BLD AUTO: 0.3 %
EOSINOPHIL # BLD AUTO: 0 K/UL
EOSINOPHIL NFR BLD AUTO: 0 %
HCT VFR BLD CALC: 39.7 %
HGB BLD-MCNC: 11.9 G/DL
IMM GRANULOCYTES NFR BLD AUTO: 0.2 %
LYMPHOCYTES # BLD AUTO: 1.28 K/UL
LYMPHOCYTES NFR BLD AUTO: 11.2 %
MAN DIFF?: NORMAL
MCHC RBC-ENTMCNC: 28.7 PG
MCHC RBC-ENTMCNC: 30 GM/DL
MCV RBC AUTO: 95.9 FL
MONOCYTES # BLD AUTO: 0.72 K/UL
MONOCYTES NFR BLD AUTO: 6.3 %
NEUTROPHILS # BLD AUTO: 9.41 K/UL
NEUTROPHILS NFR BLD AUTO: 82 %
PLATELET # BLD AUTO: 357 K/UL
RBC # BLD: 4.14 M/UL
RBC # FLD: 14.9 %
WBC # FLD AUTO: 11.47 K/UL

## 2020-03-19 ENCOUNTER — APPOINTMENT (OUTPATIENT)
Dept: CARDIOLOGY | Facility: CLINIC | Age: 80
End: 2020-03-19

## 2020-04-12 ENCOUNTER — RX RENEWAL (OUTPATIENT)
Age: 80
End: 2020-04-12

## 2020-04-21 ENCOUNTER — APPOINTMENT (OUTPATIENT)
Dept: INTERNAL MEDICINE | Facility: CLINIC | Age: 80
End: 2020-04-21
Payer: MEDICARE

## 2020-04-21 VITALS — RESPIRATION RATE: 14 BRPM

## 2020-04-21 DIAGNOSIS — J18.9 PNEUMONIA, UNSPECIFIED ORGANISM: ICD-10-CM

## 2020-04-21 PROCEDURE — 99214 OFFICE O/P EST MOD 30 MIN: CPT | Mod: 95

## 2020-04-21 RX ORDER — DOXYCYCLINE 100 MG/1
100 TABLET, FILM COATED ORAL
Qty: 6 | Refills: 0 | Status: DISCONTINUED | COMMUNITY
Start: 2020-04-17

## 2020-04-21 RX ORDER — PREDNISONE 20 MG/1
20 TABLET ORAL
Qty: 3 | Refills: 0 | Status: DISCONTINUED | COMMUNITY
Start: 2020-02-24

## 2020-04-21 RX ORDER — DOXYCYCLINE 100 MG/1
100 CAPSULE ORAL
Qty: 20 | Refills: 1 | Status: DISCONTINUED | COMMUNITY
Start: 2020-01-16 | End: 2020-04-21

## 2020-04-21 RX ORDER — GABAPENTIN 600 MG/1
600 TABLET, FILM COATED ORAL
Qty: 30 | Refills: 0 | Status: DISCONTINUED | COMMUNITY
Start: 2019-12-12

## 2020-04-21 NOTE — PHYSICAL EXAM
[No Acute Distress] : no acute distress [Normal Sclera/Conjunctiva] : normal sclera/conjunctiva [Well-Appearing] : well-appearing [Well Nourished] : well nourished [Normal Outer Ear/Nose] : the outer ears and nose were normal in appearance [No JVD] : no jugular venous distention [Supple] : supple [No Respiratory Distress] : no respiratory distress  [Soft] : abdomen soft [No Accessory Muscle Use] : no accessory muscle use [Non Tender] : non-tender [Normal Affect] : the affect was normal [No Focal Deficits] : no focal deficits [Alert and Oriented x3] : oriented to person, place, and time [Normal Mood] : the mood was normal [Normal Insight/Judgement] : insight and judgment were intact [de-identified] : OBSERVATIONAL [de-identified] : 1+ RLE, NON PITTING [de-identified] : NEEDS ASSIST.

## 2020-04-21 NOTE — DISCUSSION/SUMMARY
[FreeTextEntry1] : IMPRESSION: Ms. Batista is an 79 year old woman with a history of HTN, hyperlipidemia, type 2 diabetes mellitus, breast cancer status post right partial mastectomy, rectal carcinoma status post resection, diastolic heart failure, anemia, hypokalemia, and renal insufficiency who presents today for follow up of HTN and heart failure.\par \par PLAN:\par 1. Given her right lower extremity edema she will schedule a LE doppler to evaluate for a DVT. Her lower leg is suspicious for cellulitis, and she is scheduled to see you later today for further evaluation. She will also schedule an appointment to follow up with the vascular cardiologist. She will continue on the Eliquis 2.5 mg twice daily for her DVT as prescribed by Vascular and she will continue to follow up with Vascular Cardiology. \par 2. She will continue on Bumex 1mg BID and Spironolactone 12.5mg daily.  We will need to follow her potassium and creatinine on her current diuretic regimen, particularly given her renal insufficiency.\par 3. Her blood pressure is well controlled, thus she will continue to watch her diet and will also continue on Isosorbide 60mg daily, Hydralazine 100 mg twice daily, Bumex, Spironolactone, and Metoprolol ER 25 mg daily.  \par 4. Her LDL was good on her most recent lipid profile with mildly elevated triglycerides, thus she will continue on Atorvastatin 10mg daily along with a low cholesterol diet. \par 5. Given her abnormal nuclear stress test with ischemia, I have suggested that she should have a left heart cath prior to hip surgery. Should she decide against surgery, we can consider medical management at this time given that her heart failure symptoms have been stable. She understands the risks and benefits associated with the procedure, particularly given her renal insufficiency, which was also discussed with her by her nephrologist. Her daughter will speak with her as well as with the patient's son and will notify me as to the route that they wish to proceed with. The patient at this time is pretty sure that she wants to proceed with hip replacement and therefore with a left heart cath, however, they will notify me next week of their final decision. Should she decide to proceed with the cath, she will hold her diuretics for 2 days prior to the procedure and also get IVF for 6 hours at the time of her cath, based on the recommendations of her Nephrologist.\par 6. She will follow up with me in 6-8 weeks or sooner if she is symptomatic.\par 7. I spent approximately 45 minutes with the patient and her daughter and more than 50% of the time was spent discussing her heart failure and her abnormal nuclear stress test.

## 2020-04-21 NOTE — REVIEW OF SYSTEMS
[Joint Pain] : joint pain [Joint Stiffness] : joint stiffness [Back Pain] : back pain [Anxiety] : anxiety [Negative] : Heme/Lymph [Joint Swelling] : no joint swelling [Muscle Weakness] : no muscle weakness [Muscle Pain] : no muscle pain [Depression] : no depression [Insomnia] : no insomnia [Suicidal] : not suicidal [FreeTextEntry9] : RIGHT HIP [FreeTextEntry2] : SEE HPI

## 2020-04-21 NOTE — HISTORY OF PRESENT ILLNESS
[FreeTextEntry1] : Patient is an 79 year old woman with a history of HTN, hyperlipidemia, type 2 diabetes mellitus, breast cancer status post right partial mastectomy, rectal carcinoma status post resection, anemia, hypokalemia, diastolic heart failure, right lower extremity DVT, and renal insufficiency who presents today for follow up of HTN and heart failure. She was hospitalized in December for acute heart failure and given IV diuretics with improvement. She was discharged on Bumex 1mg BID and she went to rehab. Currently she reports her weights have been stable at home, 194-196lbs, however, she did not take her medications yet today. She continues to experience right hip and leg pain, for which she follows with pain management. She otherwise denies any chest pain, palpitations, dyspnea at rest, headaches, dizziness, or palpitations. Her right upper extremity swelling due to chronic lymphedema and dyspnea with exertion are unchanged. She got scratched by a cat on her right lower leg.

## 2020-04-21 NOTE — CARDIOLOGY SUMMARY
[LVEF ___%] : LVEF [unfilled]% [___] : [unfilled] [Moderate] : moderate pulmonary hypertension [Mild] : mild mitral regurgitation

## 2020-04-21 NOTE — PHYSICAL EXAM
[General Appearance - Well Developed] : well developed [Normal Appearance] : normal appearance [No Deformities] : no deformities [General Appearance - Well Nourished] : well nourished [Normal Conjunctiva] : the conjunctiva exhibited no abnormalities [Normal Oral Mucosa] : normal oral mucosa [No Oral Cyanosis] : no oral cyanosis [No Oral Pallor] : no oral pallor [Normal Jugular Venous V Waves Present] : normal jugular venous V waves present [Normal Jugular Venous A Waves Present] : normal jugular venous A waves present [Respiration, Rhythm And Depth] : normal respiratory rhythm and effort [Exaggerated Use Of Accessory Muscles For Inspiration] : no accessory muscle use [Normal Rate] : normal [Auscultation Breath Sounds / Voice Sounds] : lungs were clear to auscultation bilaterally [Rhythm Regular] : regular [Normal S1] : normal S1 [Normal S2] : normal S2 [II] : a grade 2 [Abdomen Tenderness] : non-tender [Abdomen Soft] : soft [Nail Clubbing] : no clubbing of the fingernails [Petechial Hemorrhages (___cm)] : no petechial hemorrhages [Cyanosis, Localized] : no localized cyanosis [] : no rash [Skin Color & Pigmentation] : normal skin color and pigmentation [Affect] : the affect was normal [Skin Lesions] : no skin lesions [Oriented To Time, Place, And Person] : oriented to person, place, and time [No Anxiety] : not feeling anxious [Mood] : the mood was normal [Right Carotid Bruit] : no bruit heard over the right carotid [Left Carotid Bruit] : no bruit heard over the left carotid [Bruit] : no bruit heard [___+] : [unfilled]U+ pretibial pitting edema on the right [Bowel Sounds] : normal bowel sounds [FreeTextEntry1] : +RLE shin is erythematous, warm to touch, cat scratch noted.

## 2020-04-24 ENCOUNTER — NON-APPOINTMENT (OUTPATIENT)
Age: 80
End: 2020-04-24

## 2020-04-24 NOTE — DISCUSSION/SUMMARY
[FreeTextEntry1] : IMPRESSION: Ms. Batista is an 80 year old woman with a history of HTN, hyperlipidemia, type 2 diabetes mellitus, breast cancer status post right partial mastectomy, rectal carcinoma status post resection, diastolic heart failure, anemia, hypokalemia, and renal insufficiency who presents today for follow up of HTN and heart failure following recent hospitalization.\par \par PLAN:\par 1.She will continue on the Eliquis 2.5 mg twice daily for her DVT as prescribed by Vascular and she will continue to follow up with Vascular Cardiology. \par 2. For her lower extremity edema she will continue on Bumex 1mg BID and Spironolactone 12.5mg daily.  Her edema is stable today. We will need to follow her potassium and creatinine on her current diuretic regimen, particularly given her renal insufficiency. She will be having a CMP checked today with you. She will continue to monitor her daily weights closely at home, and will let us know if she has any significant weight gain. She will also watch the amount of sodium in her diet.\par 3. Her blood pressure is well controlled, thus she will continue to watch her diet and will also continue on Isosorbide 60mg daily, Hydralazine 100 mg twice daily, Bumex, Spironolactone, and Metoprolol ER 25 mg daily.  \par 4. Her LDL was good on her most recent lipid profile with elevated triglycerides, thus she will continue on Atorvastatin 10mg daily along with a low cholesterol diet. \par 5. Given her abnormal nuclear stress test with ischemia, I have suggested that she should have a left heart cath prior to hip surgery. Should she decide against surgery, we can consider medical management at this time given that her heart failure symptoms have been stable. She understands the risks and benefits associated with the procedure, particularly given her renal insufficiency, which was also discussed with her by her nephrologist. Her daughter will speak with her as well as with the patient's son and will notify me as to the route that they wish to proceed with. Should she decide to proceed with the cath, she will hold her diuretics for 2 days prior to the procedure and also get IVF for 6 hours at the time of her cath, based on the recommendations of her Nephrologist.\par 6. She will follow up with me in 2 months or sooner if she is symptomatic or if she decides to proceed with the hip surgery.

## 2020-04-24 NOTE — HISTORY OF PRESENT ILLNESS
[FreeTextEntry1] : Patient is an 80 year old woman with a history of HTN, hyperlipidemia, type 2 diabetes mellitus, breast cancer status post right partial mastectomy, rectal carcinoma status post resection, anemia, hypokalemia, diastolic heart failure, right lower extremity DVT, and renal insufficiency who presents today for follow up of HTN and heart failure following recent hospitalization for acute CHF exacerbation. She was hospitalized last month for acute heart failure and given IV Lasix with improvement. She was given Colchicine for a gout flare-up of he right wrist, which led acute kidney injury and was stopped. She was discharged home on her regular dose of Bumex 1mg BID. Currently she reports her weights have been stable at home, 191-193lbs. Her weight at home this morning was 192.6lbs. She continues to experience right hip and leg pain, for which she follows with pain management. She otherwise denies any chest pain, palpitations, dyspnea at rest, headaches, dizziness, or palpitations. Her right upper extremity swelling due to chronic lymphedema and dyspnea with exertion are unchanged.

## 2020-04-24 NOTE — PHYSICAL EXAM
[General Appearance - Well Developed] : well developed [Normal Appearance] : normal appearance [General Appearance - Well Nourished] : well nourished [Normal Conjunctiva] : the conjunctiva exhibited no abnormalities [No Deformities] : no deformities [Normal Oral Mucosa] : normal oral mucosa [No Oral Pallor] : no oral pallor [Normal Jugular Venous V Waves Present] : normal jugular venous V waves present [Normal Jugular Venous A Waves Present] : normal jugular venous A waves present [No Oral Cyanosis] : no oral cyanosis [Respiration, Rhythm And Depth] : normal respiratory rhythm and effort [Exaggerated Use Of Accessory Muscles For Inspiration] : no accessory muscle use [Abdomen Soft] : soft [Abdomen Tenderness] : non-tender [Auscultation Breath Sounds / Voice Sounds] : lungs were clear to auscultation bilaterally [Nail Clubbing] : no clubbing of the fingernails [Petechial Hemorrhages (___cm)] : no petechial hemorrhages [Cyanosis, Localized] : no localized cyanosis [] : no rash [Skin Color & Pigmentation] : normal skin color and pigmentation [Affect] : the affect was normal [Oriented To Time, Place, And Person] : oriented to person, place, and time [Mood] : the mood was normal [No Anxiety] : not feeling anxious [Normal Rate] : normal [Rhythm Regular] : regular [Normal S2] : normal S2 [Normal S1] : normal S1 [II] : a grade 2 [___+] : [unfilled]U+ pretibial pitting edema on the right [General Appearance - In No Acute Distress] : no acute distress [Well Groomed] : well groomed [Right Carotid Bruit] : no bruit heard over the right carotid [Bruit] : no bruit heard [Left Carotid Bruit] : no bruit heard over the left carotid [Bowel Sounds] : normal bowel sounds [FreeTextEntry1] : 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

## 2020-04-24 NOTE — CARDIOLOGY SUMMARY
[___] : [unfilled] [LVEF ___%] : LVEF [unfilled]% [Moderate] : moderate pulmonary hypertension [Mild] : mild mitral regurgitation

## 2020-05-01 ENCOUNTER — APPOINTMENT (OUTPATIENT)
Dept: INTERNAL MEDICINE | Facility: CLINIC | Age: 80
End: 2020-05-01
Payer: MEDICARE

## 2020-05-01 VITALS
WEIGHT: 200 LBS | OXYGEN SATURATION: 97 % | SYSTOLIC BLOOD PRESSURE: 148 MMHG | HEART RATE: 72 BPM | BODY MASS INDEX: 43.28 KG/M2 | RESPIRATION RATE: 14 BRPM | DIASTOLIC BLOOD PRESSURE: 76 MMHG

## 2020-05-01 PROCEDURE — 99214 OFFICE O/P EST MOD 30 MIN: CPT | Mod: 95

## 2020-05-01 NOTE — REVIEW OF SYSTEMS
[Recent Change In Weight] : ~T recent weight change [Lower Ext Edema] : lower extremity edema [Leg Claudication] : leg claudication [Joint Pain] : joint pain [Joint Stiffness] : joint stiffness [Back Pain] : back pain [Anxiety] : anxiety [Negative] : Heme/Lymph [Fever] : no fever [Chills] : no chills [Fatigue] : no fatigue [Hot Flashes] : no hot flashes [Night Sweats] : no night sweats [Chest Pain] : no chest pain [Palpitations] : no palpitations [Paroxysmal Nocturnal Dyspnea] : no paroxysmal nocturnal dyspnea [Orthopnea] : no orthopnea [Muscle Weakness] : no muscle weakness [Muscle Pain] : no muscle pain [Joint Swelling] : no joint swelling [Suicidal] : not suicidal [Insomnia] : no insomnia [Depression] : no depression [FreeTextEntry2] : SEE HPI [FreeTextEntry9] : RIGHT HIP

## 2020-05-01 NOTE — PHYSICAL EXAM
[No Acute Distress] : no acute distress [Well Nourished] : well nourished [Well-Appearing] : well-appearing [Well Developed] : well developed [Normal Sclera/Conjunctiva] : normal sclera/conjunctiva [EOMI] : extraocular movements intact [Normal Outer Ear/Nose] : the outer ears and nose were normal in appearance [No JVD] : no jugular venous distention [Supple] : supple [No Respiratory Distress] : no respiratory distress  [No Accessory Muscle Use] : no accessory muscle use [Clear to Auscultation] : lungs were clear to auscultation bilaterally [Soft] : abdomen soft [No Extremity Clubbing/Cyanosis] : no extremity clubbing/cyanosis [Non Tender] : non-tender [No Focal Deficits] : no focal deficits [Normal Affect] : the affect was normal [Normal Insight/Judgement] : insight and judgment were intact [Alert and Oriented x3] : oriented to person, place, and time [de-identified] : 1+ PITTING EDEMA WITH BLANCHING ERYTHEMA [de-identified] : OBSERVATIONAL WITH SON HELPING

## 2020-05-21 ENCOUNTER — RX RENEWAL (OUTPATIENT)
Age: 80
End: 2020-05-21

## 2020-05-21 ENCOUNTER — APPOINTMENT (OUTPATIENT)
Dept: CARDIOLOGY | Facility: CLINIC | Age: 80
End: 2020-05-21
Payer: MEDICARE

## 2020-05-21 ENCOUNTER — NON-APPOINTMENT (OUTPATIENT)
Age: 80
End: 2020-05-21

## 2020-05-21 ENCOUNTER — APPOINTMENT (OUTPATIENT)
Dept: INTERNAL MEDICINE | Facility: CLINIC | Age: 80
End: 2020-05-21
Payer: MEDICARE

## 2020-05-21 VITALS
SYSTOLIC BLOOD PRESSURE: 142 MMHG | BODY MASS INDEX: 42.72 KG/M2 | HEIGHT: 57 IN | HEART RATE: 75 BPM | DIASTOLIC BLOOD PRESSURE: 107 MMHG | RESPIRATION RATE: 14 BRPM | OXYGEN SATURATION: 95 % | WEIGHT: 198 LBS | TEMPERATURE: 97.8 F

## 2020-05-21 VITALS — DIASTOLIC BLOOD PRESSURE: 70 MMHG | SYSTOLIC BLOOD PRESSURE: 130 MMHG

## 2020-05-21 DIAGNOSIS — M51.36 OTHER INTERVERTEBRAL DISC DEGENERATION, LUMBAR REGION: ICD-10-CM

## 2020-05-21 DIAGNOSIS — M17.11 UNILATERAL PRIMARY OSTEOARTHRITIS, RIGHT KNEE: ICD-10-CM

## 2020-05-21 PROCEDURE — 99214 OFFICE O/P EST MOD 30 MIN: CPT | Mod: 25

## 2020-05-21 PROCEDURE — 99214 OFFICE O/P EST MOD 30 MIN: CPT

## 2020-05-21 PROCEDURE — 93000 ELECTROCARDIOGRAM COMPLETE: CPT

## 2020-05-21 NOTE — PHYSICAL EXAM
[No Acute Distress] : no acute distress [Well Developed] : well developed [Well Nourished] : well nourished [EOMI] : extraocular movements intact [Normal Sclera/Conjunctiva] : normal sclera/conjunctiva [Well-Appearing] : well-appearing [Normal Oropharynx] : the oropharynx was normal [Normal Outer Ear/Nose] : the outer ears and nose were normal in appearance [No JVD] : no jugular venous distention [No Lymphadenopathy] : no lymphadenopathy [Supple] : supple [No Respiratory Distress] : no respiratory distress  [Thyroid Normal, No Nodules] : the thyroid was normal and there were no nodules present [No Accessory Muscle Use] : no accessory muscle use [Clear to Auscultation] : lungs were clear to auscultation bilaterally [Normal Rate] : normal rate  [Normal S1, S2] : normal S1 and S2 [Regular Rhythm] : with a regular rhythm

## 2020-05-21 NOTE — REVIEW OF SYSTEMS
[Recent Change In Weight] : ~T recent weight change [Leg Claudication] : leg claudication [Lower Ext Edema] : lower extremity edema [Joint Pain] : joint pain [Joint Stiffness] : joint stiffness [Back Pain] : back pain [Anxiety] : anxiety [Negative] : Heme/Lymph [Fever] : no fever [Chills] : no chills [Fatigue] : no fatigue [Hot Flashes] : no hot flashes [Night Sweats] : no night sweats [Chest Pain] : no chest pain [Palpitations] : no palpitations [Orthopnea] : no orthopnea [Paroxysmal Nocturnal Dyspnea] : no paroxysmal nocturnal dyspnea [Joint Swelling] : no joint swelling [Muscle Pain] : no muscle pain [Suicidal] : not suicidal [Muscle Weakness] : no muscle weakness [Insomnia] : no insomnia [Depression] : no depression [FreeTextEntry9] : RIGHT HIP [FreeTextEntry2] : SEE HPI

## 2020-05-24 NOTE — PHYSICAL EXAM
[General Appearance - Well Developed] : well developed [Normal Appearance] : normal appearance [General Appearance - Well Nourished] : well nourished [No Deformities] : no deformities [Normal Conjunctiva] : the conjunctiva exhibited no abnormalities [Normal Oral Mucosa] : normal oral mucosa [No Oral Pallor] : no oral pallor [No Oral Cyanosis] : no oral cyanosis [Normal Jugular Venous A Waves Present] : normal jugular venous A waves present [Normal Jugular Venous V Waves Present] : normal jugular venous V waves present [Respiration, Rhythm And Depth] : normal respiratory rhythm and effort [Exaggerated Use Of Accessory Muscles For Inspiration] : no accessory muscle use [Auscultation Breath Sounds / Voice Sounds] : lungs were clear to auscultation bilaterally [Normal Rate] : normal [Rhythm Regular] : regular [Normal S1] : normal S1 [II] : a grade 2 [Normal S2] : normal S2 [Abdomen Tenderness] : non-tender [Abdomen Soft] : soft [Petechial Hemorrhages (___cm)] : no petechial hemorrhages [Cyanosis, Localized] : no localized cyanosis [Nail Clubbing] : no clubbing of the fingernails [] : no rash [Skin Color & Pigmentation] : normal skin color and pigmentation [Oriented To Time, Place, And Person] : oriented to person, place, and time [Affect] : the affect was normal [Mood] : the mood was normal [No Anxiety] : not feeling anxious [Well Groomed] : well groomed [General Appearance - In No Acute Distress] : no acute distress [Left Carotid Bruit] : no bruit heard over the left carotid [Right Carotid Bruit] : no bruit heard over the right carotid [Bruit] : no bruit heard [___+] : [unfilled]U+ pretibial pitting edema on the right [FreeTextEntry1] : She has chronic swelling of her right upper extremity secondary to lymphedema. [No Skin Ulcers] : no skin ulcer

## 2020-05-24 NOTE — DISCUSSION/SUMMARY
[FreeTextEntry1] : IMPRESSION: Ms. Batista is an 80 year old woman with a history of HTN, hyperlipidemia, type 2 diabetes mellitus, breast cancer status post right partial mastectomy, rectal carcinoma status post resection, diastolic heart failure, anemia, hypokalemia, and renal insufficiency who presents today for follow up of HTN and heart failure.\par \par PLAN:\par 1.She will continue on the Eliquis 2.5 mg twice daily for her DVT as prescribed by Vascular and she will continue to follow up with [facility] Cardiology. \par 2. For her lower extremity edema and heart failure she will continue on Bumex 1mg BID and Spironolactone 12.5mg daily.  Her edema is stable today. We will need to follow her potassium and creatinine on her current diuretic regimen, particularly given her renal insufficiency. She will be having a CMP checked today with you. She will continue to monitor her daily weights closely at home, and will let us know if she has any significant weight gain. She will also watch the amount of sodium in her diet.\par 3. Her blood pressure is acceptable, thus she will continue to watch her diet and will also continue on Isosorbide 60mg daily, Hydralazine 100 mg twice daily, Bumex, Spironolactone, and Metoprolol ER 25 mg daily.  \par 4. Her LDL was good on her most recent lipid profile with elevated triglycerides, thus she will continue on Atorvastatin 10mg daily along with a low cholesterol diet. She will have a lipid profile checked today.\par 5. Given her abnormal nuclear stress test with ischemia, I have suggested that she should have a left heart cath prior to hip surgery. Should she decide against surgery, we will continue with medical management at this time given that her heart failure symptoms have been relatively controlled with medical therapy. She understands the risks and benefits associated with the procedure, particularly given her renal insufficiency, which was also discussed with her by her nephrologist. Her daughter will speak with her as well as with the patient's son and will notify me as to the route that they wish to proceed with. Should she decide to proceed with the cath, she will hold her diuretics for 2 days prior to the procedure and also get IVF for 6 hours at the time of her cath, based on the recommendations of her Nephrologist.\par 6. She will follow up with me in 3 months or sooner if she is symptomatic or if she decides to proceed with the hip surgery.

## 2020-05-24 NOTE — HISTORY OF PRESENT ILLNESS
[FreeTextEntry1] : Patient is an 80 year old woman with a history of HTN, hyperlipidemia, type 2 diabetes mellitus, breast cancer status post right partial mastectomy, rectal carcinoma status post resection, anemia, hypokalemia, diastolic heart failure, right lower extremity DVT, and renal insufficiency who presents today for follow up of HTN and heart failure following recent hospitalization for pneumonia. She was hospitalized in April for pneumonia, and discharged on antibiotics. Last month her Bumex was increased to 1mg TID due to weight gain and lower extremity edema. She took the increased dose for a few days, and went back to BID when her weight stabilized. As per her family her weight has been stable at home 196-200lbs. She continues to experience right hip and leg pain, for which she follows with pain management. She otherwise denies any chest pain, palpitations, dyspnea at rest, headaches, dizziness, or palpitations. Her right upper extremity swelling due to chronic lymphedema and dyspnea with exertion are unchanged.

## 2020-05-25 LAB
25(OH)D3 SERPL-MCNC: 56.9 NG/ML
ALBUMIN SERPL ELPH-MCNC: 4 G/DL
ALP BLD-CCNC: 72 U/L
ALT SERPL-CCNC: 14 U/L
ANION GAP SERPL CALC-SCNC: 19 MMOL/L
APPEARANCE: CLEAR
AST SERPL-CCNC: 18 U/L
BASOPHILS # BLD AUTO: 0.03 K/UL
BASOPHILS NFR BLD AUTO: 0.3 %
BILIRUB SERPL-MCNC: 0.3 MG/DL
BILIRUBIN URINE: NEGATIVE
BLOOD URINE: NEGATIVE
BUN SERPL-MCNC: 45 MG/DL
CALCIUM SERPL-MCNC: 9.6 MG/DL
CHLORIDE SERPL-SCNC: 98 MMOL/L
CHOLEST SERPL-MCNC: 149 MG/DL
CHOLEST/HDLC SERPL: 3 RATIO
CO2 SERPL-SCNC: 23 MMOL/L
COLOR: NORMAL
CREAT SERPL-MCNC: 1.99 MG/DL
EOSINOPHIL # BLD AUTO: 0.02 K/UL
EOSINOPHIL NFR BLD AUTO: 0.2 %
ESTIMATED AVERAGE GLUCOSE: 171 MG/DL
GLUCOSE QUALITATIVE U: NEGATIVE
GLUCOSE SERPL-MCNC: 139 MG/DL
GLUCOSE SERPL-MCNC: 141 MG/DL
HBA1C MFR BLD HPLC: 7.6 %
HCT VFR BLD CALC: 40.6 %
HDLC SERPL-MCNC: 50 MG/DL
HGB BLD-MCNC: 12.3 G/DL
IMM GRANULOCYTES NFR BLD AUTO: 0.3 %
KETONES URINE: NEGATIVE
LDLC SERPL CALC-MCNC: 70 MG/DL
LEUKOCYTE ESTERASE URINE: NEGATIVE
LYMPHOCYTES # BLD AUTO: 1.23 K/UL
LYMPHOCYTES NFR BLD AUTO: 12.5 %
MAGNESIUM SERPL-MCNC: 2.1 MG/DL
MAN DIFF?: NORMAL
MCHC RBC-ENTMCNC: 28.9 PG
MCHC RBC-ENTMCNC: 30.3 GM/DL
MCV RBC AUTO: 95.5 FL
MONOCYTES # BLD AUTO: 0.63 K/UL
MONOCYTES NFR BLD AUTO: 6.4 %
NEUTROPHILS # BLD AUTO: 7.9 K/UL
NEUTROPHILS NFR BLD AUTO: 80.3 %
NITRITE URINE: NEGATIVE
PH URINE: 6
PLATELET # BLD AUTO: 310 K/UL
POTASSIUM SERPL-SCNC: 4.4 MMOL/L
PROT SERPL-MCNC: 6.7 G/DL
PROTEIN URINE: NEGATIVE
RBC # BLD: 4.25 M/UL
RBC # FLD: 15.6 %
SARS-COV-2 IGG SERPL IA-ACNC: <0.1 INDEX
SARS-COV-2 IGG SERPL QL IA: NEGATIVE
SODIUM SERPL-SCNC: 140 MMOL/L
SPECIFIC GRAVITY URINE: 1.01
TRIGL SERPL-MCNC: 146 MG/DL
UROBILINOGEN URINE: NORMAL
VIT B12 SERPL-MCNC: 788 PG/ML
WBC # FLD AUTO: 9.84 K/UL

## 2020-06-02 LAB
CREAT SPEC-SCNC: 28 MG/DL
MICROALBUMIN 24H UR DL<=1MG/L-MCNC: <1.2 MG/DL
MICROALBUMIN/CREAT 24H UR-RTO: NORMAL MG/G

## 2020-06-12 NOTE — H&P ADULT - PROBLEM/PLAN-10
Received request via: Patient    Was the patient seen in the last year in this department? Yes    Does the patient have an active prescription (recently filled or refills available) for medication(s) requested? No     Good morning Dr. Adkins,     I hope you are having a great week.     I am out of refills for rivaroxaban 20 MG tablets (AKA Xarelto). My Chart will not let me request refills. I have two pills left, so can you please renew this prescription at the [facility]?     I appreciate your help!     Kind regards,     José Bray    DISPLAY PLAN FREE TEXT

## 2020-06-23 ENCOUNTER — APPOINTMENT (OUTPATIENT)
Dept: INTERNAL MEDICINE | Facility: CLINIC | Age: 80
End: 2020-06-23
Payer: MEDICARE

## 2020-06-23 VITALS
DIASTOLIC BLOOD PRESSURE: 71 MMHG | RESPIRATION RATE: 14 BRPM | HEIGHT: 57 IN | OXYGEN SATURATION: 96 % | HEART RATE: 73 BPM | WEIGHT: 199 LBS | SYSTOLIC BLOOD PRESSURE: 146 MMHG | TEMPERATURE: 98.6 F | BODY MASS INDEX: 42.93 KG/M2

## 2020-06-23 DIAGNOSIS — R60.0 LOCALIZED EDEMA: ICD-10-CM

## 2020-06-23 DIAGNOSIS — R23.8 OTHER SKIN CHANGES: ICD-10-CM

## 2020-06-23 PROCEDURE — 99214 OFFICE O/P EST MOD 30 MIN: CPT

## 2020-06-23 NOTE — REVIEW OF SYSTEMS
[Recent Change In Weight] : ~T recent weight change [Leg Claudication] : leg claudication [Lower Ext Edema] : lower extremity edema [Joint Stiffness] : joint stiffness [Joint Pain] : joint pain [Back Pain] : back pain [Skin Rash] : skin rash [Anxiety] : anxiety [Negative] : Heme/Lymph [Fatigue] : no fatigue [Chills] : no chills [Fever] : no fever [Hot Flashes] : no hot flashes [Night Sweats] : no night sweats [Chest Pain] : no chest pain [Palpitations] : no palpitations [Orthopnea] : no orthopnea [Paroxysmal Nocturnal Dyspnea] : no paroxysmal nocturnal dyspnea [Joint Swelling] : no joint swelling [Muscle Weakness] : no muscle weakness [Muscle Pain] : no muscle pain [Insomnia] : no insomnia [Itching] : no itching [Suicidal] : not suicidal [Depression] : no depression [FreeTextEntry9] : RIGHT HIP [de-identified] : LARGE BULLA [FreeTextEntry2] : SEE HPI

## 2020-06-23 NOTE — PHYSICAL EXAM
[No Acute Distress] : no acute distress [Well Nourished] : well nourished [Well-Appearing] : well-appearing [Well Developed] : well developed [EOMI] : extraocular movements intact [Normal Sclera/Conjunctiva] : normal sclera/conjunctiva [No JVD] : no jugular venous distention [Normal Oropharynx] : the oropharynx was normal [Normal Outer Ear/Nose] : the outer ears and nose were normal in appearance [No Lymphadenopathy] : no lymphadenopathy [Thyroid Normal, No Nodules] : the thyroid was normal and there were no nodules present [Supple] : supple [No Accessory Muscle Use] : no accessory muscle use [No Respiratory Distress] : no respiratory distress  [Regular Rhythm] : with a regular rhythm [Normal Rate] : normal rate  [Clear to Auscultation] : lungs were clear to auscultation bilaterally [Normal S1, S2] : normal S1 and S2 [de-identified] : 3+ PITTING EDEMA RLE, 2+ LLE, WITH DIFFUSE ERYTHEMA

## 2020-06-26 ENCOUNTER — APPOINTMENT (OUTPATIENT)
Dept: INTERNAL MEDICINE | Facility: CLINIC | Age: 80
End: 2020-06-26

## 2020-06-30 ENCOUNTER — APPOINTMENT (OUTPATIENT)
Dept: INTERNAL MEDICINE | Facility: CLINIC | Age: 80
End: 2020-06-30
Payer: MEDICARE

## 2020-06-30 VITALS — SYSTOLIC BLOOD PRESSURE: 110 MMHG | DIASTOLIC BLOOD PRESSURE: 60 MMHG

## 2020-06-30 VITALS
TEMPERATURE: 98.3 F | RESPIRATION RATE: 14 BRPM | WEIGHT: 193 LBS | HEART RATE: 85 BPM | SYSTOLIC BLOOD PRESSURE: 113 MMHG | BODY MASS INDEX: 41.64 KG/M2 | DIASTOLIC BLOOD PRESSURE: 69 MMHG | OXYGEN SATURATION: 96 % | HEIGHT: 57 IN

## 2020-06-30 PROCEDURE — 99214 OFFICE O/P EST MOD 30 MIN: CPT

## 2020-07-01 LAB
BASOPHILS # BLD AUTO: 0.02 K/UL
BASOPHILS NFR BLD AUTO: 0.2 %
EOSINOPHIL # BLD AUTO: 0.18 K/UL
EOSINOPHIL NFR BLD AUTO: 1.8 %
HCT VFR BLD CALC: 40.7 %
HGB BLD-MCNC: 12.3 G/DL
IMM GRANULOCYTES NFR BLD AUTO: 0.3 %
LYMPHOCYTES # BLD AUTO: 0.96 K/UL
LYMPHOCYTES NFR BLD AUTO: 9.7 %
MAGNESIUM SERPL-MCNC: 2 MG/DL
MAN DIFF?: NORMAL
MCHC RBC-ENTMCNC: 29.6 PG
MCHC RBC-ENTMCNC: 30.2 GM/DL
MCV RBC AUTO: 97.8 FL
MONOCYTES # BLD AUTO: 0.74 K/UL
MONOCYTES NFR BLD AUTO: 7.5 %
NEUTROPHILS # BLD AUTO: 7.98 K/UL
NEUTROPHILS NFR BLD AUTO: 80.5 %
PLATELET # BLD AUTO: 329 K/UL
RBC # BLD: 4.16 M/UL
RBC # FLD: 15.6 %
URATE SERPL-MCNC: 8.1 MG/DL
WBC # FLD AUTO: 9.91 K/UL

## 2020-07-01 NOTE — REVIEW OF SYSTEMS
[Leg Claudication] : leg claudication [Lower Ext Edema] : lower extremity edema [Joint Pain] : joint pain [Joint Stiffness] : joint stiffness [Back Pain] : back pain [Skin Rash] : skin rash [Anxiety] : anxiety [Negative] : Heme/Lymph [Fever] : no fever [Fatigue] : no fatigue [Chills] : no chills [Hot Flashes] : no hot flashes [Night Sweats] : no night sweats [Chest Pain] : no chest pain [Palpitations] : no palpitations [Orthopnea] : no orthopnea [Joint Swelling] : no joint swelling [Muscle Weakness] : no muscle weakness [Paroxysmal Nocturnal Dyspnea] : no paroxysmal nocturnal dyspnea [Suicidal] : not suicidal [Itching] : no itching [Muscle Pain] : no muscle pain [Depression] : no depression [Insomnia] : no insomnia [FreeTextEntry2] : SEE HPI [de-identified] : LARGE BULLA [FreeTextEntry9] : RIGHT HIP

## 2020-07-01 NOTE — PHYSICAL EXAM
[No Acute Distress] : no acute distress [Well Nourished] : well nourished [Well-Appearing] : well-appearing [Well Developed] : well developed [Normal Sclera/Conjunctiva] : normal sclera/conjunctiva [EOMI] : extraocular movements intact [Normal Oropharynx] : the oropharynx was normal [Normal Outer Ear/Nose] : the outer ears and nose were normal in appearance [No JVD] : no jugular venous distention [No Lymphadenopathy] : no lymphadenopathy [Thyroid Normal, No Nodules] : the thyroid was normal and there were no nodules present [Supple] : supple [No Respiratory Distress] : no respiratory distress  [No Accessory Muscle Use] : no accessory muscle use [Clear to Auscultation] : lungs were clear to auscultation bilaterally [Regular Rhythm] : with a regular rhythm [Normal Rate] : normal rate  [Normal S1, S2] : normal S1 and S2 [de-identified] : WOUND WAS UNCOVERED AND BULLA IS GONE, BUT NOW CLEAN BASE AREA OF LARGE ULCER, A NEW PHOTO WAS TAKEN [de-identified] : 2+ PITTING EDEMA RLE, 1+ LLE, WITH DIFFUSE ERYTHEMA

## 2020-07-13 LAB
ANION GAP SERPL CALC-SCNC: 15 MMOL/L
BUN SERPL-MCNC: 45 MG/DL
CALCIUM SERPL-MCNC: 9.8 MG/DL
CHLORIDE SERPL-SCNC: 97 MMOL/L
CO2 SERPL-SCNC: 25 MMOL/L
CREAT SERPL-MCNC: 1.89 MG/DL
GLUCOSE SERPL-MCNC: 250 MG/DL
POTASSIUM SERPL-SCNC: 4.6 MMOL/L
SODIUM SERPL-SCNC: 137 MMOL/L

## 2020-07-31 ENCOUNTER — APPOINTMENT (OUTPATIENT)
Dept: INTERNAL MEDICINE | Facility: CLINIC | Age: 80
End: 2020-07-31
Payer: MEDICARE

## 2020-07-31 PROCEDURE — 99214 OFFICE O/P EST MOD 30 MIN: CPT | Mod: 95

## 2020-07-31 NOTE — REVIEW OF SYSTEMS
[Recent Change In Weight] : ~T recent weight change [Lower Ext Edema] : lower extremity edema [Leg Claudication] : leg claudication [Joint Stiffness] : joint stiffness [Joint Pain] : joint pain [Back Pain] : back pain [Skin Rash] : skin rash [Anxiety] : anxiety [Negative] : Heme/Lymph [Fever] : no fever [Chills] : no chills [Fatigue] : no fatigue [Night Sweats] : no night sweats [Hot Flashes] : no hot flashes [Chest Pain] : no chest pain [Palpitations] : no palpitations [Orthopnea] : no orthopnea [Paroxysmal Nocturnal Dyspnea] : no paroxysmal nocturnal dyspnea [Muscle Weakness] : no muscle weakness [Muscle Pain] : no muscle pain [Joint Swelling] : no joint swelling [Suicidal] : not suicidal [Itching] : no itching [Depression] : no depression [Insomnia] : no insomnia [FreeTextEntry2] : SEE HPI [FreeTextEntry9] : RIGHT HIP [de-identified] : LARGE BULLA

## 2020-07-31 NOTE — PHYSICAL EXAM
[No Acute Distress] : no acute distress [Normal Sclera/Conjunctiva] : normal sclera/conjunctiva [Normal Outer Ear/Nose] : the outer ears and nose were normal in appearance [No Accessory Muscle Use] : no accessory muscle use [No JVD] : no jugular venous distention [No Respiratory Distress] : no respiratory distress  [Normal Affect] : the affect was normal [Soft] : abdomen soft [Normal Insight/Judgement] : insight and judgment were intact [Alert and Oriented x3] : oriented to person, place, and time [de-identified] : Observational [de-identified] : Decreased redness of the right lower extremity. [de-identified] : Needs assisted gait [de-identified] : Decreased right lower extremity edema, and erythrema.  Clean dressing is in place over the right lower extremity wound.

## 2020-08-25 ENCOUNTER — NON-APPOINTMENT (OUTPATIENT)
Age: 80
End: 2020-08-25

## 2020-08-25 ENCOUNTER — APPOINTMENT (OUTPATIENT)
Dept: INTERNAL MEDICINE | Facility: CLINIC | Age: 80
End: 2020-08-25
Payer: MEDICARE

## 2020-08-25 ENCOUNTER — APPOINTMENT (OUTPATIENT)
Dept: CARDIOLOGY | Facility: CLINIC | Age: 80
End: 2020-08-25
Payer: MEDICARE

## 2020-08-25 VITALS
WEIGHT: 196 LBS | SYSTOLIC BLOOD PRESSURE: 129 MMHG | BODY MASS INDEX: 42.41 KG/M2 | HEART RATE: 61 BPM | RESPIRATION RATE: 14 BRPM | OXYGEN SATURATION: 96 % | TEMPERATURE: 97.3 F | DIASTOLIC BLOOD PRESSURE: 76 MMHG

## 2020-08-25 PROCEDURE — 93000 ELECTROCARDIOGRAM COMPLETE: CPT

## 2020-08-25 PROCEDURE — 99214 OFFICE O/P EST MOD 30 MIN: CPT | Mod: 25

## 2020-08-25 PROCEDURE — 99214 OFFICE O/P EST MOD 30 MIN: CPT

## 2020-08-25 RX ORDER — METOLAZONE 2.5 MG/1
2.5 TABLET ORAL
Qty: 15 | Refills: 0 | Status: DISCONTINUED | COMMUNITY
Start: 2020-06-23 | End: 2020-08-25

## 2020-08-25 RX ORDER — DOXYCYCLINE 100 MG/1
100 CAPSULE ORAL
Qty: 20 | Refills: 1 | Status: DISCONTINUED | COMMUNITY
Start: 2020-06-23 | End: 2020-08-25

## 2020-08-25 NOTE — HISTORY OF PRESENT ILLNESS
[FreeTextEntry1] : Patient is an 80 year old woman with a history of HTN, hyperlipidemia, type 2 diabetes mellitus, breast cancer status post right partial mastectomy, rectal carcinoma status post resection, anemia, hypokalemia, diastolic heart failure, right lower extremity DVT, and renal insufficiency who presents today for follow up of HTN and heart failure. Last month she reported a significant weight gain of about 9 lbs in two weeks, and she took a couple doses of Metolazone 2.5mg prn, which brought her weight back down. She followed up with the nephrologist, Dr. Kan, and her Bumex was increased to 1.5mg in the morning and 1mg in the evening. She was unable to tolerate the increased dose of Bumex, and decreased it back to 1mg BID. Her creatinine was subsequently found to be elevated, which may have been from the increased dose of her diuretics. Over the past week and a half her weight has been stable around 196 lbs. She continues to experience severe right hip and leg pain, for which she follows with pain management. She otherwise denies any chest pain, palpitations, dyspnea at rest, headaches, dizziness, or palpitations. Her right upper extremity swelling due to chronic lymphedema and dyspnea with exertion are unchanged.

## 2020-08-25 NOTE — CARDIOLOGY SUMMARY
[___] : [unfilled] [LVEF ___%] : LVEF [unfilled]% [Mild] : mild mitral regurgitation [Moderate] : moderate pulmonary hypertension

## 2020-08-25 NOTE — REVIEW OF SYSTEMS
[Joint Pain] : joint pain [Negative] : Heme/Lymph [Dyspnea on exertion] : dyspnea during exertion [Chest Pain] : no chest pain [Lower Ext Edema] : lower extremity edema [Palpitations] : no palpitations

## 2020-08-25 NOTE — REVIEW OF SYSTEMS
[Recent Change In Weight] : ~T recent weight change [Lower Ext Edema] : lower extremity edema [Leg Claudication] : leg claudication [Joint Stiffness] : joint stiffness [Joint Pain] : joint pain [Back Pain] : back pain [Anxiety] : anxiety [Negative] : Heme/Lymph [Fever] : no fever [Chills] : no chills [Fatigue] : no fatigue [Hot Flashes] : no hot flashes [Night Sweats] : no night sweats [Chest Pain] : no chest pain [Palpitations] : no palpitations [Orthopnea] : no orthopnea [Paroxysmal Nocturnal Dyspnea] : no paroxysmal nocturnal dyspnea [Joint Swelling] : no joint swelling [Muscle Weakness] : no muscle weakness [Muscle Pain] : no muscle pain [Insomnia] : no insomnia [Suicidal] : not suicidal [Depression] : no depression [FreeTextEntry9] : RIGHT HIP [FreeTextEntry2] : SEE HPI

## 2020-08-25 NOTE — PHYSICAL EXAM
[General Appearance - Well Developed] : well developed [Normal Appearance] : normal appearance [General Appearance - Well Nourished] : well nourished [No Deformities] : no deformities [Normal Conjunctiva] : the conjunctiva exhibited no abnormalities [Normal Oral Mucosa] : normal oral mucosa [No Oral Pallor] : no oral pallor [No Oral Cyanosis] : no oral cyanosis [Normal Jugular Venous A Waves Present] : normal jugular venous A waves present [Normal Jugular Venous V Waves Present] : normal jugular venous V waves present [Respiration, Rhythm And Depth] : normal respiratory rhythm and effort [Auscultation Breath Sounds / Voice Sounds] : lungs were clear to auscultation bilaterally [Exaggerated Use Of Accessory Muscles For Inspiration] : no accessory muscle use [Abdomen Tenderness] : non-tender [Abdomen Soft] : soft [Nail Clubbing] : no clubbing of the fingernails [Petechial Hemorrhages (___cm)] : no petechial hemorrhages [Cyanosis, Localized] : no localized cyanosis [] : no rash [Skin Color & Pigmentation] : normal skin color and pigmentation [Affect] : the affect was normal [Oriented To Time, Place, And Person] : oriented to person, place, and time [Mood] : the mood was normal [No Anxiety] : not feeling anxious [Normal Rate] : normal [Normal S2] : normal S2 [Rhythm Regular] : regular [Normal S1] : normal S1 [II] : a grade 2 [___+] : [unfilled]U+ pretibial pitting edema on the right [Well Groomed] : well groomed [Right Carotid Bruit] : no bruit heard over the right carotid [Bruit] : no bruit heard [Left Carotid Bruit] : no bruit heard over the left carotid [Bowel Sounds] : normal bowel sounds [FreeTextEntry1] : 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 She was in a wheelchair today. [No Skin Ulcers] : no skin ulcer

## 2020-08-25 NOTE — DISCUSSION/SUMMARY
[FreeTextEntry1] : IMPRESSION: Ms. Batista is an 80 year old woman with a history of HTN, hyperlipidemia, type 2 diabetes mellitus, breast cancer status post right partial mastectomy, rectal carcinoma status post resection, diastolic heart failure, anemia, hypokalemia, and renal insufficiency who presents today for follow up of HTN and heart failure.\par \par PLAN:\par 1.She will continue on the Eliquis 2.5 mg twice daily for her DVT as prescribed by Vascular and she will continue to follow up with [facility] Cardiology. \par 2. For her lower extremity edema and heart failure she will continue on Bumex 1mg BID and Spironolactone 12.5mg daily.  Her edema is stable today. We will need to follow her potassium and creatinine on her current diuretic regimen, particularly given her renal insufficiency. She will be having a CMP checked today with you later today. She will continue to monitor her daily weights closely at home, and will let us know if she has any significant weight gain. She will also watch the amount of sodium in her diet. I have advised the daughter that she can give her the extra 0.5 mg of Bumex as needed if her weight goes up by more than 2 pounds. \par 3. Her blood pressure is good today, thus she will continue to watch her diet and will also continue on Isosorbide 60 mg daily, Hydralazine 100 mg twice daily, Bumex, Spironolactone, and Metoprolol ER 25 mg daily.  \par 4. Her most recent lipid profile was good, thus she will continue on Atorvastatin 10mg daily along with a low cholesterol diet. \par 5. Given her abnormal nuclear stress test with ischemia, I have suggested that she should have a left heart cath prior to hip surgery. Should she decide against surgery, we will continue with medical management at this time given that her heart failure symptoms have been relatively controlled with medical therapy. She understands the risks and benefits associated with the procedure, particularly given her renal insufficiency, which was also discussed with her by her nephrologist. Her daughter will speak with her as well as with the patient's son and will notify me as to the route that they wish to proceed with. Should she decide to proceed with the cath, she will hold her diuretics for 2 days prior to the procedure and also get IVF for 6 hours at the time of her cath, based on the recommendations of her Nephrologist.\par 6. She will follow up with me in 3 months or sooner if she is symptomatic or if she decides to proceed with the hip surgery.

## 2020-08-25 NOTE — PHYSICAL EXAM
[No Acute Distress] : no acute distress [Normal Outer Ear/Nose] : the outer ears and nose were normal in appearance [Normal Sclera/Conjunctiva] : normal sclera/conjunctiva [No Respiratory Distress] : no respiratory distress  [No JVD] : no jugular venous distention [No Accessory Muscle Use] : no accessory muscle use [Normal Affect] : the affect was normal [Soft] : abdomen soft [Normal Insight/Judgement] : insight and judgment were intact [Alert and Oriented x3] : oriented to person, place, and time [de-identified] : Observational [de-identified] : Decreased right lower extremity edema, and erythrema.  Clean dressing is in place over the right lower extremity wound. [de-identified] : Decreased redness of the right lower extremity. [de-identified] : Needs assisted gait

## 2020-08-26 LAB
25(OH)D3 SERPL-MCNC: 51.6 NG/ML
ALBUMIN SERPL ELPH-MCNC: 4.6 G/DL
ALP BLD-CCNC: 83 U/L
ALT SERPL-CCNC: 10 U/L
ANION GAP SERPL CALC-SCNC: 27 MMOL/L
AST SERPL-CCNC: 20 U/L
BASOPHILS # BLD AUTO: 0.02 K/UL
BASOPHILS NFR BLD AUTO: 0.3 %
BILIRUB SERPL-MCNC: 0.2 MG/DL
BUN SERPL-MCNC: 39 MG/DL
CALCIUM SERPL-MCNC: 9.7 MG/DL
CHLORIDE SERPL-SCNC: 99 MMOL/L
CHOLEST SERPL-MCNC: 161 MG/DL
CHOLEST/HDLC SERPL: 2.8 RATIO
CO2 SERPL-SCNC: 18 MMOL/L
CREAT SERPL-MCNC: 1.96 MG/DL
CREAT SPEC-SCNC: 186 MG/DL
EOSINOPHIL # BLD AUTO: 0.02 K/UL
EOSINOPHIL NFR BLD AUTO: 0.3 %
ESTIMATED AVERAGE GLUCOSE: 151 MG/DL
FERRITIN SERPL-MCNC: 38 NG/ML
FOLATE SERPL-MCNC: 16.9 NG/ML
GLUCOSE SERPL-MCNC: 116 MG/DL
GLUCOSE SERPL-MCNC: 117 MG/DL
HBA1C MFR BLD HPLC: 6.9 %
HCT VFR BLD CALC: 44.4 %
HDLC SERPL-MCNC: 58 MG/DL
HGB BLD-MCNC: 12.8 G/DL
IMM GRANULOCYTES NFR BLD AUTO: 0.3 %
IRON SERPL-MCNC: 53 UG/DL
LDLC SERPL CALC-MCNC: 68 MG/DL
LYMPHOCYTES # BLD AUTO: 1.33 K/UL
LYMPHOCYTES NFR BLD AUTO: 16.8 %
MAGNESIUM SERPL-MCNC: 2.3 MG/DL
MAN DIFF?: NORMAL
MCHC RBC-ENTMCNC: 28.8 GM/DL
MCHC RBC-ENTMCNC: 29 PG
MCV RBC AUTO: 100.7 FL
MICROALBUMIN 24H UR DL<=1MG/L-MCNC: 3.4 MG/DL
MICROALBUMIN/CREAT 24H UR-RTO: 18 MG/G
MONOCYTES # BLD AUTO: 0.69 K/UL
MONOCYTES NFR BLD AUTO: 8.7 %
NEUTROPHILS # BLD AUTO: 5.84 K/UL
NEUTROPHILS NFR BLD AUTO: 73.6 %
PLATELET # BLD AUTO: 278 K/UL
POTASSIUM SERPL-SCNC: 4 MMOL/L
PROT SERPL-MCNC: 7.5 G/DL
RBC # BLD: 4.41 M/UL
RBC # FLD: 15.7 %
SODIUM SERPL-SCNC: 144 MMOL/L
TRIGL SERPL-MCNC: 176 MG/DL
TSH SERPL-ACNC: 1.04 UIU/ML
URATE SERPL-MCNC: 9.2 MG/DL
VIT B12 SERPL-MCNC: 689 PG/ML
WBC # FLD AUTO: 7.92 K/UL

## 2020-08-27 LAB
SARS-COV-2 IGG SERPL IA-ACNC: <0.1 INDEX
SARS-COV-2 IGG SERPL QL IA: NEGATIVE

## 2020-09-10 NOTE — PHYSICAL THERAPY INITIAL EVALUATION ADULT - DIAGNOSIS, PT EVAL
ALPRAZolam 0.5 mg oral tablet: 1 tab(s) orally every 6 hours, As needed, anxiety  dexamethasone: 4 milligram(s) intravenously every 8 hours  diazePAM 2 mg oral tablet: 1 tab(s) orally once a day  diazePAM 5 mg oral tablet: 1 tab(s) orally once a day (at bedtime)  heparin: 5000 unit(s) subcutaneous every 8 hours  HYDROmorphone: 0.5 milligram(s) intravenous every 4 hours, As Needed for severe pain (7-10)  nicotine 7 mg/24 hr transdermal film, extended release: 1 patch transdermal once a day  oxycodone-acetaminophen 5 mg-325 mg oral tablet: 2 tab(s) orally every 6 hours, As needed, Moderate Pain (4 - 6)  pantoprazole 40 mg oral delayed release tablet: 1 tab(s) orally once a day (before a meal) Decreased functional mobility due to decreased strength and decreased endurance

## 2020-09-18 ENCOUNTER — RX RENEWAL (OUTPATIENT)
Age: 80
End: 2020-09-18

## 2020-10-08 ENCOUNTER — APPOINTMENT (OUTPATIENT)
Dept: INTERNAL MEDICINE | Facility: CLINIC | Age: 80
End: 2020-10-08
Payer: MEDICARE

## 2020-10-08 ENCOUNTER — LABORATORY RESULT (OUTPATIENT)
Age: 80
End: 2020-10-08

## 2020-10-08 ENCOUNTER — NON-APPOINTMENT (OUTPATIENT)
Age: 80
End: 2020-10-08

## 2020-10-08 VITALS
HEIGHT: 57 IN | DIASTOLIC BLOOD PRESSURE: 91 MMHG | RESPIRATION RATE: 14 BRPM | SYSTOLIC BLOOD PRESSURE: 182 MMHG | OXYGEN SATURATION: 95 % | HEART RATE: 70 BPM | TEMPERATURE: 97.3 F

## 2020-10-08 VITALS — BODY MASS INDEX: 40.9 KG/M2 | WEIGHT: 189 LBS | SYSTOLIC BLOOD PRESSURE: 150 MMHG | DIASTOLIC BLOOD PRESSURE: 90 MMHG

## 2020-10-08 DIAGNOSIS — Z23 ENCOUNTER FOR IMMUNIZATION: ICD-10-CM

## 2020-10-08 DIAGNOSIS — R06.00 DYSPNEA, UNSPECIFIED: ICD-10-CM

## 2020-10-08 DIAGNOSIS — S69.90XA UNSPECIFIED INJURY OF UNSPECIFIED WRIST, HAND AND FINGER(S), INITIAL ENCOUNTER: ICD-10-CM

## 2020-10-08 DIAGNOSIS — M54.16 RADICULOPATHY, LUMBAR REGION: ICD-10-CM

## 2020-10-08 DIAGNOSIS — Z71.89 OTHER SPECIFIED COUNSELING: ICD-10-CM
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
## 2020-10-08 PROCEDURE — 99214 OFFICE O/P EST MOD 30 MIN: CPT | Mod: 25

## 2020-10-08 PROCEDURE — 93000 ELECTROCARDIOGRAM COMPLETE: CPT

## 2020-10-08 NOTE — PHYSICAL EXAM
Speech Language Pathology Treatment Note  Facility/Department: [facility]  6/26/2019    Rehab Dx/Hx: No admission diagnoses are documented for this encounter. Precautions: falls and aspirations    ST Dx: Aphasia, Dysphagia and Cognitive Impairment     Date of Admit: 5/31/2019  Room #: R240/R240-01    Time in: 09:42 AM Time out: 10:00 AM  Pt arrived late to speech office secondary to confusion with transportation (Pt accidentally taken back to his room)    Subjective:  Alert, Cooperative and Pleasant        Interventions used this date:  Expressive Language and Receptive Language    Objective/Assessment:  Patient progressing towards goals:  Short-term Goals for Speech & Language POC:     Goal 1: Pt will follow 1 step directions given orally with 75% accuracy with mod cues to increase the pt's ability to follow directions provided by caregivers for safe follow through with ADLs. Consistent moderate-maximum cues provided for 1 step directions with object manipulation    Goal 2: Pt will identify objects/pictures within a field of 2-3 with 75% accuracy with mod cues in order to increase his understanding of objects in his environment for safer and more independent completion of ADLs. [place] required for accuracy    Goal 3: Pt will answer low level yes/no questions with 85% accuracy with mod cues to assist the caregiver in obtaining important information regarding the patient's personal, medical, and safety needs. Goal 4: Pt will complete confrontational naming tasks with 90% accuracy with mild verbal cues to help the patient express his/her basic wants and needs. Pt completed confrontational naming tasks with 57% accuracy with mod-max semantic and phonemic cues provided. Goal 5: Pt will perform speech automatics (1-20, OMAR, CARLTON) with min cues with 85% accuracy to help the patient express his basic wants and needs.    Pt initially counted by even numbers but self corrected to count accurately 1-10 independently! Required cues for initiation for stating OMAR    Short-term Goals for Dysphagia POC:  Goal 1: Pt will demonstrate small bites/sips for safe and efficient swallow of mechanical soft and trials of soft/regular consistencies, given cues as needed, in all given opportunities. Treatment/Activity Tolerance:  Patient tolerated treatment well    Plan:  Continue per POC    Pain:  Patient demonstrated no s/s of pain. Patient/Caregiver Education:  Patient educated on session and progression towards goals. and Education needs reinforcement.     Safety Devices:  Chair alarm in place     Comprehension:  2- Maximal assist    Expression:  2- Maximal assist - 3 Mod Assist    Problem Solvin- Total assist    Memory:  1- Total assist    Signature:   Electronically signed by CIERRA Gonzalez on 2019 at 9:58 AM [No Acute Distress] : no acute distress [Normal Sclera/Conjunctiva] : normal sclera/conjunctiva [Normal Outer Ear/Nose] : the outer ears and nose were normal in appearance [No JVD] : no jugular venous distention [No Respiratory Distress] : no respiratory distress  [No Accessory Muscle Use] : no accessory muscle use [Soft] : abdomen soft [Normal Affect] : the affect was normal [Alert and Oriented x3] : oriented to person, place, and time [Normal Insight/Judgement] : insight and judgment were intact [de-identified] : Observational [de-identified] : Decreased right lower extremity edema, and erythrema.  Clean dressing is in place over the right lower extremity wound. [de-identified] : Decreased redness of the right lower extremity. [de-identified] : Needs assisted gait

## 2020-10-08 NOTE — REVIEW OF SYSTEMS
[Recent Change In Weight] : ~T recent weight change [Leg Claudication] : leg claudication [Lower Ext Edema] : lower extremity edema [Paroxysmal Nocturnal Dyspnea] : paroxysmal nocturnal dyspnea [Joint Pain] : joint pain [Joint Stiffness] : joint stiffness [Joint Swelling] : joint swelling [Back Pain] : back pain [Anxiety] : anxiety [Negative] : Heme/Lymph [Fever] : no fever [Chills] : no chills [Fatigue] : no fatigue [Hot Flashes] : no hot flashes [Night Sweats] : no night sweats [Chest Pain] : no chest pain [Palpitations] : no palpitations [Orthopnea] : no orthopnea [Muscle Weakness] : no muscle weakness [Muscle Pain] : no muscle pain [Suicidal] : not suicidal [Insomnia] : no insomnia [Depression] : no depression [FreeTextEntry2] : SEE HPI [FreeTextEntry9] : RIGHT HIP, LEFT WRIST

## 2020-10-09 ENCOUNTER — LABORATORY RESULT (OUTPATIENT)
Age: 80
End: 2020-10-09

## 2020-10-09 LAB
25(OH)D3 SERPL-MCNC: 44.2 NG/ML
ALBUMIN SERPL ELPH-MCNC: 4.3 G/DL
ALP BLD-CCNC: 79 U/L
ALT SERPL-CCNC: 19 U/L
ANION GAP SERPL CALC-SCNC: 17 MMOL/L
AST SERPL-CCNC: 16 U/L
BASOPHILS # BLD AUTO: 0.04 K/UL
BASOPHILS NFR BLD AUTO: 0.5 %
BILIRUB SERPL-MCNC: 0.4 MG/DL
BUN SERPL-MCNC: 26 MG/DL
CALCIUM SERPL-MCNC: 10.3 MG/DL
CHLORIDE SERPL-SCNC: 99 MMOL/L
CHOLEST SERPL-MCNC: 153 MG/DL
CHOLEST/HDLC SERPL: 3.1 RATIO
CO2 SERPL-SCNC: 23 MMOL/L
CREAT SERPL-MCNC: 1.6 MG/DL
CREAT SPEC-SCNC: 83 MG/DL
EOSINOPHIL # BLD AUTO: 0.16 K/UL
EOSINOPHIL NFR BLD AUTO: 1.8 %
ESTIMATED AVERAGE GLUCOSE: 160 MG/DL
FERRITIN SERPL-MCNC: 77 NG/ML
FOLATE SERPL-MCNC: 11.6 NG/ML
GLUCOSE SERPL-MCNC: 150 MG/DL
GLUCOSE SERPL-MCNC: 150 MG/DL
HBA1C MFR BLD HPLC: 7.2 %
HCT VFR BLD CALC: 41.9 %
HDLC SERPL-MCNC: 49 MG/DL
HGB BLD-MCNC: 12.8 G/DL
IMM GRANULOCYTES NFR BLD AUTO: 0.3 %
IRON SERPL-MCNC: 54 UG/DL
LDLC SERPL CALC-MCNC: 67 MG/DL
LYMPHOCYTES # BLD AUTO: 1.04 K/UL
LYMPHOCYTES NFR BLD AUTO: 11.8 %
MAN DIFF?: NORMAL
MCHC RBC-ENTMCNC: 29.4 PG
MCHC RBC-ENTMCNC: 30.5 GM/DL
MCV RBC AUTO: 96.1 FL
MICROALBUMIN 24H UR DL<=1MG/L-MCNC: 50.1 MG/DL
MICROALBUMIN/CREAT 24H UR-RTO: 601 MG/G
MONOCYTES # BLD AUTO: 0.58 K/UL
MONOCYTES NFR BLD AUTO: 6.6 %
NEUTROPHILS # BLD AUTO: 6.97 K/UL
NEUTROPHILS NFR BLD AUTO: 79 %
PLATELET # BLD AUTO: 335 K/UL
POTASSIUM SERPL-SCNC: 4.2 MMOL/L
PROT SERPL-MCNC: 7.2 G/DL
RBC # BLD: 4.36 M/UL
RBC # FLD: 14.8 %
SARS-COV-2 IGG SERPL IA-ACNC: 0.1 INDEX
SARS-COV-2 IGG SERPL QL IA: NEGATIVE
SODIUM SERPL-SCNC: 139 MMOL/L
TRIGL SERPL-MCNC: 187 MG/DL
TSH SERPL-ACNC: 0.86 UIU/ML
VIT B12 SERPL-MCNC: 874 PG/ML
WBC # FLD AUTO: 8.82 K/UL

## 2020-10-12 LAB
APPEARANCE: CLEAR
BILIRUBIN URINE: NEGATIVE
BLOOD URINE: NORMAL
COLOR: YELLOW
GLUCOSE QUALITATIVE U: NEGATIVE
KETONES URINE: NEGATIVE
LEUKOCYTE ESTERASE URINE: ABNORMAL
NITRITE URINE: POSITIVE
PH URINE: 6.5
PROTEIN URINE: ABNORMAL
SPECIFIC GRAVITY URINE: 1.02
UROBILINOGEN URINE: NORMAL

## 2020-10-12 RX ORDER — OXYCODONE AND ACETAMINOPHEN 10; 325 MG/1; MG/1
10-325 TABLET ORAL
Qty: 90 | Refills: 0 | Status: DISCONTINUED | COMMUNITY
Start: 2020-05-20

## 2020-10-20 ENCOUNTER — APPOINTMENT (OUTPATIENT)
Dept: INTERNAL MEDICINE | Facility: CLINIC | Age: 80
End: 2020-10-20
Payer: MEDICARE

## 2020-10-20 VITALS
BODY MASS INDEX: 39.7 KG/M2 | TEMPERATURE: 97.3 F | HEART RATE: 65 BPM | RESPIRATION RATE: 12 BRPM | DIASTOLIC BLOOD PRESSURE: 63 MMHG | WEIGHT: 184 LBS | OXYGEN SATURATION: 95 % | HEIGHT: 57 IN | SYSTOLIC BLOOD PRESSURE: 118 MMHG

## 2020-10-20 DIAGNOSIS — R10.9 UNSPECIFIED ABDOMINAL PAIN: ICD-10-CM

## 2020-10-20 DIAGNOSIS — R32 UNSPECIFIED URINARY INCONTINENCE: ICD-10-CM

## 2020-10-20 DIAGNOSIS — E53.8 DEFICIENCY OF OTHER SPECIFIED B GROUP VITAMINS: ICD-10-CM

## 2020-10-20 PROCEDURE — 99214 OFFICE O/P EST MOD 30 MIN: CPT

## 2020-10-20 RX ORDER — CIPROFLOXACIN HYDROCHLORIDE 250 MG/1
250 TABLET, FILM COATED ORAL
Qty: 10 | Refills: 0 | Status: DISCONTINUED | COMMUNITY
Start: 2020-10-14 | End: 2020-10-20

## 2020-10-20 RX ORDER — PNEUMOCOCCAL 13-VALENT CONJUGATE VACCINE 2.2; 2.2; 2.2; 2.2; 2.2; 4.4; 2.2; 2.2; 2.2; 2.2; 2.2; 2.2; 2.2 UG/.5ML; UG/.5ML; UG/.5ML; UG/.5ML; UG/.5ML; UG/.5ML; UG/.5ML; UG/.5ML; UG/.5ML; UG/.5ML; UG/.5ML; UG/.5ML; UG/.5ML
INJECTION, SUSPENSION INTRAMUSCULAR
Qty: 1 | Refills: 0 | Status: DISCONTINUED | COMMUNITY
Start: 2020-10-08 | End: 2020-10-20

## 2020-10-20 NOTE — PHYSICAL EXAM
[No Acute Distress] : no acute distress [Normal Sclera/Conjunctiva] : normal sclera/conjunctiva [Normal Outer Ear/Nose] : the outer ears and nose were normal in appearance [No JVD] : no jugular venous distention [No Accessory Muscle Use] : no accessory muscle use [No Respiratory Distress] : no respiratory distress  [Soft] : abdomen soft [Normal Affect] : the affect was normal [Alert and Oriented x3] : oriented to person, place, and time [Normal Insight/Judgement] : insight and judgment were intact [de-identified] : Observational [de-identified] : Decreased right lower extremity edema, and erythrema.  Clean dressing is in place over the right lower extremity wound. [de-identified] : Needs assisted gait [de-identified] : Decreased redness of the right lower extremity.

## 2020-10-20 NOTE — REVIEW OF SYSTEMS
[Recent Change In Weight] : ~T recent weight change [Leg Claudication] : leg claudication [Paroxysmal Nocturnal Dyspnea] : paroxysmal nocturnal dyspnea [Lower Ext Edema] : lower extremity edema [Incontinence] : incontinence [Nocturia] : nocturia [Joint Pain] : joint pain [Joint Stiffness] : joint stiffness [Joint Swelling] : joint swelling [Unsteady Walking] : ataxia [Back Pain] : back pain [Anxiety] : anxiety [Negative] : Heme/Lymph [Fever] : no fever [Chills] : no chills [Fatigue] : no fatigue [Hot Flashes] : no hot flashes [Night Sweats] : no night sweats [Palpitations] : no palpitations [Chest Pain] : no chest pain [Orthopnea] : no orthopnea [Dysuria] : no dysuria [Hematuria] : no hematuria [Frequency] : no frequency [Vaginal Discharge] : no vaginal discharge [Muscle Weakness] : no muscle weakness [Insomnia] : no insomnia [Muscle Pain] : no muscle pain [Suicidal] : not suicidal [FreeTextEntry2] : SEE HPI [Depression] : no depression [FreeTextEntry9] : RIGHT HIP, RT KNEE,LEFT WRIST

## 2020-10-23 ENCOUNTER — RX RENEWAL (OUTPATIENT)
Age: 80
End: 2020-10-23

## 2020-11-02 ENCOUNTER — INPATIENT (INPATIENT)
Facility: HOSPITAL | Age: 80
LOS: 14 days | Discharge: NURSING, REHAB OR LONG-TERM CARE | DRG: 233 | End: 2020-11-17
Attending: THORACIC SURGERY (CARDIOTHORACIC VASCULAR SURGERY) | Admitting: STUDENT IN AN ORGANIZED HEALTH CARE EDUCATION/TRAINING PROGRAM
Payer: MEDICARE

## 2020-11-02 ENCOUNTER — APPOINTMENT (OUTPATIENT)
Dept: UROLOGY | Facility: CLINIC | Age: 80
End: 2020-11-02
Payer: MEDICARE

## 2020-11-02 VITALS
HEIGHT: 60 IN | SYSTOLIC BLOOD PRESSURE: 168 MMHG | HEART RATE: 84 BPM | DIASTOLIC BLOOD PRESSURE: 92 MMHG | TEMPERATURE: 98 F | OXYGEN SATURATION: 97 % | RESPIRATION RATE: 20 BRPM

## 2020-11-02 VITALS
SYSTOLIC BLOOD PRESSURE: 152 MMHG | OXYGEN SATURATION: 92 % | TEMPERATURE: 98 F | HEART RATE: 76 BPM | RESPIRATION RATE: 14 BRPM | HEIGHT: 57 IN | DIASTOLIC BLOOD PRESSURE: 75 MMHG

## 2020-11-02 DIAGNOSIS — I89.0 LYMPHEDEMA, NOT ELSEWHERE CLASSIFIED: ICD-10-CM

## 2020-11-02 DIAGNOSIS — N30.01 ACUTE CYSTITIS WITH HEMATURIA: ICD-10-CM

## 2020-11-02 DIAGNOSIS — Z90.49 ACQUIRED ABSENCE OF OTHER SPECIFIED PARTS OF DIGESTIVE TRACT: Chronic | ICD-10-CM

## 2020-11-02 DIAGNOSIS — N13.30 UNSPECIFIED HYDRONEPHROSIS: ICD-10-CM

## 2020-11-02 DIAGNOSIS — N13.5 CROSSING VESSEL AND STRICTURE OF URETER W/OUT HYDRONEPHROSIS: ICD-10-CM

## 2020-11-02 DIAGNOSIS — Z93.2 ILEOSTOMY STATUS: Chronic | ICD-10-CM

## 2020-11-02 DIAGNOSIS — R31.29 OTHER MICROSCOPIC HEMATURIA: ICD-10-CM

## 2020-11-02 DIAGNOSIS — Z90.11 ACQUIRED ABSENCE OF RIGHT BREAST AND NIPPLE: Chronic | ICD-10-CM

## 2020-11-02 DIAGNOSIS — Z98.89 OTHER SPECIFIED POSTPROCEDURAL STATES: Chronic | ICD-10-CM

## 2020-11-02 DIAGNOSIS — Z96.651 PRESENCE OF RIGHT ARTIFICIAL KNEE JOINT: Chronic | ICD-10-CM

## 2020-11-02 LAB
ALBUMIN SERPL ELPH-MCNC: 3.9 G/DL — SIGNIFICANT CHANGE UP (ref 3.3–5)
ALP SERPL-CCNC: 79 U/L — SIGNIFICANT CHANGE UP (ref 40–120)
ALT FLD-CCNC: 14 U/L — SIGNIFICANT CHANGE UP (ref 10–45)
ANION GAP SERPL CALC-SCNC: 13 MMOL/L — SIGNIFICANT CHANGE UP (ref 5–17)
APTT BLD: 33.8 SEC — SIGNIFICANT CHANGE UP (ref 27.5–35.5)
AST SERPL-CCNC: 31 U/L — SIGNIFICANT CHANGE UP (ref 10–40)
BASOPHILS # BLD AUTO: 0.01 K/UL — SIGNIFICANT CHANGE UP (ref 0–0.2)
BASOPHILS NFR BLD AUTO: 0.1 % — SIGNIFICANT CHANGE UP (ref 0–2)
BILIRUB SERPL-MCNC: 0.4 MG/DL — SIGNIFICANT CHANGE UP (ref 0.2–1.2)
BUN SERPL-MCNC: 22 MG/DL — SIGNIFICANT CHANGE UP (ref 7–23)
CALCIUM SERPL-MCNC: 9.7 MG/DL — SIGNIFICANT CHANGE UP (ref 8.4–10.5)
CHLORIDE SERPL-SCNC: 101 MMOL/L — SIGNIFICANT CHANGE UP (ref 96–108)
CO2 SERPL-SCNC: 24 MMOL/L — SIGNIFICANT CHANGE UP (ref 22–31)
CREAT SERPL-MCNC: 1.36 MG/DL — HIGH (ref 0.5–1.3)
EOSINOPHIL # BLD AUTO: 0.01 K/UL — SIGNIFICANT CHANGE UP (ref 0–0.5)
EOSINOPHIL NFR BLD AUTO: 0.1 % — SIGNIFICANT CHANGE UP (ref 0–6)
GAS PNL BLDV: SIGNIFICANT CHANGE UP
GLUCOSE SERPL-MCNC: 164 MG/DL — HIGH (ref 70–99)
HCT VFR BLD CALC: 39.6 % — SIGNIFICANT CHANGE UP (ref 34.5–45)
HGB BLD-MCNC: 12.5 G/DL — SIGNIFICANT CHANGE UP (ref 11.5–15.5)
IMM GRANULOCYTES NFR BLD AUTO: 0.4 % — SIGNIFICANT CHANGE UP (ref 0–1.5)
INR BLD: 1.19 RATIO — HIGH (ref 0.88–1.16)
LYMPHOCYTES # BLD AUTO: 0.63 K/UL — LOW (ref 1–3.3)
LYMPHOCYTES # BLD AUTO: 7.7 % — LOW (ref 13–44)
MCHC RBC-ENTMCNC: 29.6 PG — SIGNIFICANT CHANGE UP (ref 27–34)
MCHC RBC-ENTMCNC: 31.6 GM/DL — LOW (ref 32–36)
MCV RBC AUTO: 93.6 FL — SIGNIFICANT CHANGE UP (ref 80–100)
MONOCYTES # BLD AUTO: 0.64 K/UL — SIGNIFICANT CHANGE UP (ref 0–0.9)
MONOCYTES NFR BLD AUTO: 7.9 % — SIGNIFICANT CHANGE UP (ref 2–14)
NEUTROPHILS # BLD AUTO: 6.82 K/UL — SIGNIFICANT CHANGE UP (ref 1.8–7.4)
NEUTROPHILS NFR BLD AUTO: 83.8 % — HIGH (ref 43–77)
NRBC # BLD: 0 /100 WBCS — SIGNIFICANT CHANGE UP (ref 0–0)
PLATELET # BLD AUTO: 275 K/UL — SIGNIFICANT CHANGE UP (ref 150–400)
POTASSIUM SERPL-MCNC: 4.8 MMOL/L — SIGNIFICANT CHANGE UP (ref 3.5–5.3)
POTASSIUM SERPL-SCNC: 4.8 MMOL/L — SIGNIFICANT CHANGE UP (ref 3.5–5.3)
PROT SERPL-MCNC: 7.4 G/DL — SIGNIFICANT CHANGE UP (ref 6–8.3)
PROTHROM AB SERPL-ACNC: 14.2 SEC — HIGH (ref 10.6–13.6)
RBC # BLD: 4.23 M/UL — SIGNIFICANT CHANGE UP (ref 3.8–5.2)
RBC # FLD: 15.1 % — HIGH (ref 10.3–14.5)
SODIUM SERPL-SCNC: 138 MMOL/L — SIGNIFICANT CHANGE UP (ref 135–145)
TROPONIN T, HIGH SENSITIVITY RESULT: 24 NG/L — SIGNIFICANT CHANGE UP (ref 0–51)
WBC # BLD: 8.14 K/UL — SIGNIFICANT CHANGE UP (ref 3.8–10.5)
WBC # FLD AUTO: 8.14 K/UL — SIGNIFICANT CHANGE UP (ref 3.8–10.5)

## 2020-11-02 PROCEDURE — 99285 EMERGENCY DEPT VISIT HI MDM: CPT | Mod: CS,GC

## 2020-11-02 PROCEDURE — 93010 ELECTROCARDIOGRAM REPORT: CPT

## 2020-11-02 PROCEDURE — 99204 OFFICE O/P NEW MOD 45 MIN: CPT | Mod: PD

## 2020-11-02 PROCEDURE — 93971 EXTREMITY STUDY: CPT | Mod: 26,RT

## 2020-11-02 PROCEDURE — 71045 X-RAY EXAM CHEST 1 VIEW: CPT | Mod: 26

## 2020-11-02 RX ORDER — DIPHENHYDRAMINE HCL 50 MG
50 CAPSULE ORAL ONCE
Refills: 0 | Status: COMPLETED | OUTPATIENT
Start: 2020-11-02 | End: 2020-11-02

## 2020-11-02 NOTE — ED ADULT TRIAGE NOTE - ACCOMPANIED BY
[de-identified] : - Constitutional: This is a female in no obvious distress.  She was accompanied by a friend today who helped in translation from Japanese.\par - Psych: Patient is alert and oriented to person, place and time.  Patient has a normal mood and affect.\par - Cardiovascular: Normal pulses throughout the upper extremities.  No significant varicosities are noted in the upper extremities. \par - Neuro: Strength and sensation are intact throughout the upper extremities.  Patient has normal coordination.\par - Respiratory:  Patient exhibits no evidence of shortness of breath or difficulty breathing.\par - Skin: No rashes, lesions, or other abnormalities are noted in the upper extremities.\par \par ---\par \par Examination of her right wrist demonstrates mild swelling and residual tenderness along the first dorsal compartment.  There is evidence of depigmentation and atrophy consistent with her prior cortisone injection.  She has a positive Finkelstein sign.  There is no tenderness along the CMC joint.  She is neurovascularly intact distally.  However, she does have complaints of intermittent tingling along the dorsal ulnar sensory nerve branch at the dorsum of the thumb.
EMT/paramedic

## 2020-11-02 NOTE — ED PROVIDER NOTE - NS ED ROS FT
General: no fever  Head: no headache  Eyes: no vision change  ENT: no nasal discharge/congestion, no sore throat  CV: no chest pain  Resp: +SOB, no cough  GI: +transient nausea today, none currently, no vomiting, no abdominal pain, no blood in stool  : +increased frequency  MSK: +right wrist pain, +right hip pain, +right leg swelling  Skin: no new rash  Neuro: no focal weakness, no change in sensation

## 2020-11-02 NOTE — ED PROVIDER NOTE - PHYSICAL EXAMINATION
General: elderly woman in no acute respiratory distress but becomes short of breath with prolonged speaking  Head: normocephalic, atraumatic  Eyes: clear eyes  Mouth: moist mucous membranes  Neck: supple neck, no lymphadenopathy  CV: normal rate and rhythm, +mild RLE edema with overlying chronic appearing skin changes, +RUE swelling, peripheral pulses 2+ bilateral LE  Respiratory: clear to auscultation bilaterally, no crackles or wheezing  Abdomen: soft, nontender, nondistended  : no suprapubic tenderness, no CVAT  MSK: full passive ROM of extremity joints with equal leg length and no tenderness to palpation of joints, no pelvis instability  Neuro: alert and oriented x2-3, speech clear, moving all extremities  Skin: no rash on chest, mild erythema of entire right arm

## 2020-11-02 NOTE — ED PROVIDER NOTE - OBJECTIVE STATEMENT
79yo woman PMH breast cancer s/p resection with chronic right arm lymphedema, DVT on eliquis, CHF, CAD, HTN, HLD, DM presents with acute onset of SOB about 1 hour prior to arrival with no associated chest pain, n/v, diaphoresis. Pt had fallen about 3 weeks ago and has been complaining of right wrist and right hip pain. She has also been having right leg swelling. No cough or fever reported. 79yo woman PMH breast cancer s/p resection with chronic right arm lymphedema, DVT on eliquis, CHF, CAD, HTN, HLD, DM presents with acute onset of SOB about 1 hour prior to arrival with no associated chest pain, n/v, diaphoresis. Pt had fallen about 3 weeks ago and has been complaining of right wrist and right hip pain. She has also been having right leg swelling. No cough or fever reported. Pt had recent UTI that was treated with abx and finished course about 4-5 days ago. 79yo woman PMH breast cancer s/p resection with chronic right arm lymphedema, DVT on eliquis, CHF, CAD, HTN, HLD, DM presents with acute onset of SOB about 1 hour prior to arrival with no associated chest pain, n/v, diaphoresis. Pt had fallen about 3 weeks ago and has been complaining of right wrist and right hip pain. She has also been having right leg swelling for the past 3 years but noticed some more swelling recently. No cough or fever reported. Pt had recent UTI that was treated with abx and finished course about 4-5 days ago.

## 2020-11-02 NOTE — ED PROVIDER NOTE - PROGRESS NOTE DETAILS
Ondina Goetz, resident MD: no findings on xray, no DVT on ultrasound, will pre-medicate for CTA to evaluate for PE. Ondina Goetz, resident MD: no findings on xray, no DVT on ultrasound, will pre-medicate for CTA to evaluate for PE. pt also with possible subcapital fracture on xray. will obtain CT to further evaluate. Attending note (Tio): CXR clear lungs, trop indeterminant with no interval change on repeat; CTA pending (~430am after premedication); wrist x-rays show no evidence of fx/dislocation (prelim read  ).  R hip xrays with shortenig and lucency suggestive of subcap femur fracture likely subacute (h/o fall 3 weeks ago); ortho consulted and patient to also get CT-pelvis (including R femur) for further evaluation. Ondina Goetz, resident MD: CTA chest reveals no PE but bilateral GGO and findings consistent with pulmonary edema, started on lasix to diureses for CHF exacerbation. CT pelvis reveals no fracture, will obtain MRI to evaluate and ortho will follow. will admit for further management and pt and pt's daughter (Shauna Jennings) [phone] is aware of plan.

## 2020-11-02 NOTE — ASSESSMENT
[FreeTextEntry1] : Very pleasant 80-year-old woman who presents for evaluation of microscopic hematuria, right hydronephrosis concerning for right UPJ obstruction, recent urinary tract infection\par -Continue antibiotics\par -We discussed potential etiologies of right hydronephrosis\par -urinalysis reviewed\par -urine culture reviewed\par -Renal ultrasound images from [facility] radiology reviewed\par -Extensive discussion of the potential etiologies of microscopic hematuria.\par -MAG3 Lasix renal scan for concern for right UPJ obstruction\par -We discussed options for management of UPJ obstruction; we will discuss this further after Lasix renal scan

## 2020-11-02 NOTE — ASSESSMENT
[FreeTextEntry1] : Very pleasant 80-year-old woman who presents for evaluation of microscopic hematuria, right hydronephrosis concerning for right UPJ obstruction, recent urinary tract infection\par -Continue antibiotics\par -We discussed potential etiologies of right hydronephrosis\par -urinalysis reviewed\par -urine culture reviewed\par -Renal ultrasound images from [facility] radiology reviewed\par -Extensive discussion of the potential etiologies of microscopic hematuria.\par -MAG3 Lasix renal scan for concern for right UPJ obstruction\par -We discussed options for management of UPJ obstruction; we will discuss this further after Lasix renal scan

## 2020-11-02 NOTE — ED PROVIDER NOTE - ATTENDING CONTRIBUTION TO CARE
I have personally performed a face to face medical and diagnostic evaluation of the patient. I have discussed with and reviewed the Resident's note and agree with the History, ROS, Physical Exam and MDM unless otherwise indicated. A brief summary of my personal evaluation and impression can be found below.    80F Uzbek speaking breast cancer, LE dvt, on AC, CHF CAD HTN HLD DM presents with a cc of sudden onset non exertional SOB prior to arrival, denies chest pain, reports having worsening RLE swelling over last few days, s/p prior R knee surgery, has chronic RUE swelling 2/2 breast resection for ca, not currently on chemo or radiation. Had fall x3 weeks ago also c/o R hop and wrist  pain, recent dx of UTI s/p completed course.     All other ROS negative, except as above and as per HPI and ROS section.    VITALS: Initial triage and subsequent vitals have been reviewed by me.  GEN APPEARANCE: WDWN, alert, non-toxic, NAD  HEAD: Atraumatic.  EYES: PERRLa, EOMI, vision grossly intact.   NECK: Supple  CV: RRR, S1S2, no c/r/m/g. Cap refill <2 seconds. No bruits.   LUNGS: CTAB. No abnormal breath sounds.  ABDOMEN: Soft, NTND. No guarding or rebound.   MSK/EXT: No spinal or extremity point tenderness. No CVA ttp. Pelvis stable. RUE/RLE edema.   NEURO: Alert, follows commands. Speech normal. Sensation and motor normal x4 extremities.   SKIN: Warm, dry and intact. No rash.  PSYCH: Appropriate    Plan/MDM: 80W hx breast ca prior DVT on AC presents with a cc of sudden onset SOB, and worsening RLE edema, exam a RUE swelling (at baseline) +RLE swelling, vss, ddx c/f chf vs pna vs pe vs dvt? will check labs cta - will premedicate prior to CT, cxr cardiacs bnp admit thereafter. I have personally performed a face to face medical and diagnostic evaluation of the patient. I have discussed with and reviewed the Resident's note and agree with the History, ROS, Physical Exam and MDM unless otherwise indicated. A brief summary of my personal evaluation and impression can be found below.    80F Ugandan speaking breast cancer, LE dvt, on AC, CHF CAD HTN HLD DM presents with a cc of sudden onset non exertional SOB prior to arrival, denies chest pain, reports having worsening RLE swelling over last few days, s/p prior R knee surgery, has chronic RUE swelling 2/2 breast resection for ca, not currently on chemo or radiation. Had fall x3 weeks ago also c/o R hop and wrist pain, recent dx of UTI s/p completed course.     All other ROS negative, except as above and as per HPI and ROS section.    VITALS: Initial triage and subsequent vitals have been reviewed by me.  GEN APPEARANCE: WDWN, alert, non-toxic, NAD  HEAD: Atraumatic.  EYES: PERRLa, EOMI, vision grossly intact.   NECK: Supple  CV: RRR, S1S2, no c/r/m/g. Cap refill <2 seconds. No bruits.   LUNGS: CTAB. No abnormal breath sounds.  ABDOMEN: Soft, NTND. No guarding or rebound.   MSK/EXT: No spinal or extremity point tenderness. No CVA ttp. Pelvis stable. RUE/RLE edema.   NEURO: Alert, follows commands. Speech normal. Sensation and motor normal x4 extremities.   SKIN: Warm, dry and intact. No rash.  PSYCH: Appropriate    Plan/MDM: 80W hx breast ca prior DVT on AC presents with a cc of sudden onset SOB, and worsening RLE edema, exam a RUE swelling (at baseline) +RLE swelling, vss, ddx c/f chf vs pna vs pe vs dvt? will check labs cta - will premedicate prior to CT, cxr cardiacs bnp admit thereafter.

## 2020-11-02 NOTE — ED ADULT NURSE NOTE - OBJECTIVE STATEMENT
81 yo Comoran speaking female AAOX3 presents to ED via EMS for SOB. Patient able to understand some English and speak it. EMS reports that patient developed SOB tonight, daughter called EMS. Pulse ox on room air 95%, non labored respirations, b/l breath sounds clear. Patient placed on cardiac monitor, NSR 70s, EKG completed. Right arm appears swollen, tenderness to touch, daughter told EMS that patient fell and thought may have broken right wrist but since has healed. No c/o n/v/d, fevers, chills, weakness. Safety measures maintained with bed in low position and side rails up. 79 yo Bulgarian speaking female AAOX3 presents to ED via EMS for SOB. Patient able to understand some English and speak it. EMS reports that patient developed SOB tonight, daughter called EMS. Pulse ox on room air 95%, non labored respirations, b/l breath sounds clear. Patient placed on cardiac monitor, NSR 70s, EKG completed. Daughter told EMS that patient fell and thought she may have broken right wrist but "has since healed on its own". Right arm appears swollen, tenderness to touch, +radial pulses b/l, patient able to move arms b/l, limb restriction band in place on right arm. No c/o dysuria, n/v/d, fevers, chills, weakness. Safety measures maintained with bed in low position and side rails up. 81 yo [place] Islander speaking female AAOX3 presents to ED via EMS for SOB. Patient able to understand some English and speak it back to RN and MDs. EMS reports that patient developed SOB which was associated with BHAT tonight, daughter called EMS. Pulse ox on room air 95%, non labored respirations, b/l breath sounds clear. Patient placed on cardiac monitor, NSR 70s, EKG completed. Daughter told EMS that patient fell 3 weeks ago and thought she may have broken right wrist but "has since healed on its own". Right arm appears swollen, tenderness to touch, +radial pulses b/l, patient able to move arms b/l, limb restriction band in place on right arm. Patient c/o right leg pain which worsens with movement, no tenderness to touch or deformity noted. No c/o dysuria, n/v/d, fevers, chills, weakness. Safety measures maintained with bed in low position and side rails up.

## 2020-11-02 NOTE — HISTORY OF PRESENT ILLNESS
[FreeTextEntry1] : Very pleasant 80-year-old woman who presents for evaluation of right hydronephrosis concerning for right UPJ obstruction, microscopic hematuria, recent urinary tract infection.  She recently underwent a renal ultrasound for evaluation of microscopic hematuria to 5 red blood cells per high-powered field.  This demonstrated significant right hydronephrosis to the level of the ureteropelvic junction.  Additionally, a urine culture demonstrated evidence of a urinary tract infection.  She was started on antibiotics.  She denies dysuria.  No flank pain or suprapubic pain.  No nausea or vomiting.  No specific timing to her symptoms.  No aggravating or alleviating factors that she knows of.  Renal ultrasound in the past demonstrated UPJ obstruction, however current study demonstrates worsening of hydronephrosis. [Urinary Urgency] : no urinary urgency [Urinary Frequency] : no urinary frequency [Hematuria - Microscopic] : microscopic hematuria [Flank Pain] : no flank pain

## 2020-11-02 NOTE — PHYSICAL EXAM
[General Appearance - Well Developed] : well developed [General Appearance - Well Nourished] : well nourished [Normal Appearance] : normal appearance [Well Groomed] : well groomed [General Appearance - In No Acute Distress] : no acute distress [Edema] : no peripheral edema [Respiration, Rhythm And Depth] : normal respiratory rhythm and effort [Exaggerated Use Of Accessory Muscles For Inspiration] : no accessory muscle use [Abdomen Soft] : soft [Abdomen Tenderness] : non-tender [Costovertebral Angle Tenderness] : no ~M costovertebral angle tenderness [Urinary Bladder Findings] : the bladder was normal on palpation [FreeTextEntry1] : Uses wheelchair [] : no rash [No Focal Deficits] : no focal deficits [Oriented To Time, Place, And Person] : oriented to person, place, and time [Affect] : the affect was normal [Mood] : the mood was normal [Not Anxious] : not anxious [No Palpable Adenopathy] : no palpable adenopathy

## 2020-11-03 ENCOUNTER — RX RENEWAL (OUTPATIENT)
Age: 80
End: 2020-11-03

## 2020-11-03 DIAGNOSIS — Z01.818 ENCOUNTER FOR OTHER PREPROCEDURAL EXAMINATION: ICD-10-CM

## 2020-11-03 DIAGNOSIS — I50.9 HEART FAILURE, UNSPECIFIED: ICD-10-CM

## 2020-11-03 DIAGNOSIS — E11.9 TYPE 2 DIABETES MELLITUS WITHOUT COMPLICATIONS: ICD-10-CM

## 2020-11-03 DIAGNOSIS — I82.409 ACUTE EMBOLISM AND THROMBOSIS OF UNSPECIFIED DEEP VEINS OF UNSPECIFIED LOWER EXTREMITY: ICD-10-CM

## 2020-11-03 LAB
ANION GAP SERPL CALC-SCNC: 15 MMOL/L — SIGNIFICANT CHANGE UP (ref 5–17)
APPEARANCE UR: CLEAR — SIGNIFICANT CHANGE UP
APTT BLD: 31.4 SEC — SIGNIFICANT CHANGE UP (ref 27.5–35.5)
BACTERIA # UR AUTO: NEGATIVE — SIGNIFICANT CHANGE UP
BILIRUB UR-MCNC: NEGATIVE — SIGNIFICANT CHANGE UP
BLD GP AB SCN SERPL QL: NEGATIVE — SIGNIFICANT CHANGE UP
BUN SERPL-MCNC: 23 MG/DL — SIGNIFICANT CHANGE UP (ref 7–23)
CALCIUM SERPL-MCNC: 9.6 MG/DL — SIGNIFICANT CHANGE UP (ref 8.4–10.5)
CHLORIDE SERPL-SCNC: 97 MMOL/L — SIGNIFICANT CHANGE UP (ref 96–108)
CO2 SERPL-SCNC: 23 MMOL/L — SIGNIFICANT CHANGE UP (ref 22–31)
COLOR SPEC: YELLOW — SIGNIFICANT CHANGE UP
CREAT SERPL-MCNC: 1.46 MG/DL — HIGH (ref 0.5–1.3)
DIFF PNL FLD: NEGATIVE — SIGNIFICANT CHANGE UP
EPI CELLS # UR: 1 /HPF — SIGNIFICANT CHANGE UP
GLUCOSE SERPL-MCNC: 221 MG/DL — HIGH (ref 70–99)
GLUCOSE UR QL: NEGATIVE — SIGNIFICANT CHANGE UP
HCT VFR BLD CALC: 40.7 % — SIGNIFICANT CHANGE UP (ref 34.5–45)
HGB BLD-MCNC: 12.6 G/DL — SIGNIFICANT CHANGE UP (ref 11.5–15.5)
HYALINE CASTS # UR AUTO: 1 /LPF — SIGNIFICANT CHANGE UP (ref 0–2)
INR BLD: 1.1 RATIO — SIGNIFICANT CHANGE UP (ref 0.88–1.16)
KETONES UR-MCNC: NEGATIVE — SIGNIFICANT CHANGE UP
LEUKOCYTE ESTERASE UR-ACNC: NEGATIVE — SIGNIFICANT CHANGE UP
MCHC RBC-ENTMCNC: 29.2 PG — SIGNIFICANT CHANGE UP (ref 27–34)
MCHC RBC-ENTMCNC: 31 GM/DL — LOW (ref 32–36)
MCV RBC AUTO: 94.4 FL — SIGNIFICANT CHANGE UP (ref 80–100)
NITRITE UR-MCNC: NEGATIVE — SIGNIFICANT CHANGE UP
NRBC # BLD: 0 /100 WBCS — SIGNIFICANT CHANGE UP (ref 0–0)
PH UR: 6 — SIGNIFICANT CHANGE UP (ref 5–8)
PLATELET # BLD AUTO: 277 K/UL — SIGNIFICANT CHANGE UP (ref 150–400)
POTASSIUM SERPL-MCNC: 4.8 MMOL/L — SIGNIFICANT CHANGE UP (ref 3.5–5.3)
POTASSIUM SERPL-SCNC: 4.8 MMOL/L — SIGNIFICANT CHANGE UP (ref 3.5–5.3)
PROT UR-MCNC: ABNORMAL
PROTHROM AB SERPL-ACNC: 13.1 SEC — SIGNIFICANT CHANGE UP (ref 10.6–13.6)
RBC # BLD: 4.31 M/UL — SIGNIFICANT CHANGE UP (ref 3.8–5.2)
RBC # FLD: 15.1 % — HIGH (ref 10.3–14.5)
RBC CASTS # UR COMP ASSIST: 1 /HPF — SIGNIFICANT CHANGE UP (ref 0–4)
RH IG SCN BLD-IMP: POSITIVE — SIGNIFICANT CHANGE UP
SARS-COV-2 RNA SPEC QL NAA+PROBE: SIGNIFICANT CHANGE UP
SODIUM SERPL-SCNC: 135 MMOL/L — SIGNIFICANT CHANGE UP (ref 135–145)
SP GR SPEC: 1.02 — SIGNIFICANT CHANGE UP (ref 1.01–1.02)
TROPONIN T, HIGH SENSITIVITY RESULT: 26 NG/L — SIGNIFICANT CHANGE UP (ref 0–51)
UROBILINOGEN FLD QL: NEGATIVE — SIGNIFICANT CHANGE UP
WBC # BLD: 6.33 K/UL — SIGNIFICANT CHANGE UP (ref 3.8–10.5)
WBC # FLD AUTO: 6.33 K/UL — SIGNIFICANT CHANGE UP (ref 3.8–10.5)
WBC UR QL: 2 /HPF — SIGNIFICANT CHANGE UP (ref 0–5)

## 2020-11-03 PROCEDURE — 99222 1ST HOSP IP/OBS MODERATE 55: CPT | Mod: GC,AI

## 2020-11-03 PROCEDURE — 73502 X-RAY EXAM HIP UNI 2-3 VIEWS: CPT | Mod: 26,RT

## 2020-11-03 PROCEDURE — 99223 1ST HOSP IP/OBS HIGH 75: CPT

## 2020-11-03 PROCEDURE — 72192 CT PELVIS W/O DYE: CPT | Mod: 26

## 2020-11-03 PROCEDURE — 71275 CT ANGIOGRAPHY CHEST: CPT | Mod: 26

## 2020-11-03 PROCEDURE — 73110 X-RAY EXAM OF WRIST: CPT | Mod: 26,RT

## 2020-11-03 PROCEDURE — 73721 MRI JNT OF LWR EXTRE W/O DYE: CPT | Mod: 26,RT

## 2020-11-03 PROCEDURE — 73552 X-RAY EXAM OF FEMUR 2/>: CPT | Mod: 26,RT

## 2020-11-03 RX ORDER — INSULIN LISPRO 100/ML
VIAL (ML) SUBCUTANEOUS
Refills: 0 | Status: DISCONTINUED | OUTPATIENT
Start: 2020-11-03 | End: 2020-11-04

## 2020-11-03 RX ORDER — ENOXAPARIN SODIUM 100 MG/ML
40 INJECTION SUBCUTANEOUS EVERY 24 HOURS
Refills: 0 | Status: DISCONTINUED | OUTPATIENT
Start: 2020-11-03 | End: 2020-11-04

## 2020-11-03 RX ORDER — LANOLIN ALCOHOL/MO/W.PET/CERES
3 CREAM (GRAM) TOPICAL AT BEDTIME
Refills: 0 | Status: DISCONTINUED | OUTPATIENT
Start: 2020-11-03 | End: 2020-11-09

## 2020-11-03 RX ORDER — ATORVASTATIN CALCIUM 80 MG/1
10 TABLET, FILM COATED ORAL AT BEDTIME
Refills: 0 | Status: DISCONTINUED | OUTPATIENT
Start: 2020-11-03 | End: 2020-11-08

## 2020-11-03 RX ORDER — ACETAMINOPHEN 500 MG
975 TABLET ORAL EVERY 8 HOURS
Refills: 0 | Status: DISCONTINUED | OUTPATIENT
Start: 2020-11-03 | End: 2020-11-09

## 2020-11-03 RX ORDER — DEXTROSE 50 % IN WATER 50 %
12.5 SYRINGE (ML) INTRAVENOUS ONCE
Refills: 0 | Status: DISCONTINUED | OUTPATIENT
Start: 2020-11-03 | End: 2020-11-09

## 2020-11-03 RX ORDER — BUMETANIDE 0.25 MG/ML
1 INJECTION INTRAMUSCULAR; INTRAVENOUS
Refills: 0 | Status: DISCONTINUED | OUTPATIENT
Start: 2020-11-03 | End: 2020-11-05

## 2020-11-03 RX ORDER — SENNA PLUS 8.6 MG/1
2 TABLET ORAL AT BEDTIME
Refills: 0 | Status: DISCONTINUED | OUTPATIENT
Start: 2020-11-03 | End: 2020-11-09

## 2020-11-03 RX ORDER — INSULIN LISPRO 100/ML
VIAL (ML) SUBCUTANEOUS AT BEDTIME
Refills: 0 | Status: DISCONTINUED | OUTPATIENT
Start: 2020-11-03 | End: 2020-11-04

## 2020-11-03 RX ORDER — OXYCODONE HYDROCHLORIDE 5 MG/1
5 TABLET ORAL EVERY 4 HOURS
Refills: 0 | Status: DISCONTINUED | OUTPATIENT
Start: 2020-11-03 | End: 2020-11-09

## 2020-11-03 RX ORDER — BUMETANIDE 0.25 MG/ML
0 INJECTION INTRAMUSCULAR; INTRAVENOUS
Qty: 0 | Refills: 0 | DISCHARGE

## 2020-11-03 RX ORDER — OXYCODONE HYDROCHLORIDE 5 MG/1
2.5 TABLET ORAL EVERY 4 HOURS
Refills: 0 | Status: DISCONTINUED | OUTPATIENT
Start: 2020-11-03 | End: 2020-11-09

## 2020-11-03 RX ORDER — HYDRALAZINE HCL 50 MG
100 TABLET ORAL
Refills: 0 | Status: DISCONTINUED | OUTPATIENT
Start: 2020-11-03 | End: 2020-11-09

## 2020-11-03 RX ORDER — DEXTROSE 50 % IN WATER 50 %
15 SYRINGE (ML) INTRAVENOUS ONCE
Refills: 0 | Status: DISCONTINUED | OUTPATIENT
Start: 2020-11-03 | End: 2020-11-09

## 2020-11-03 RX ORDER — INFLUENZA VIRUS VACCINE 15; 15; 15; 15 UG/.5ML; UG/.5ML; UG/.5ML; UG/.5ML
0.5 SUSPENSION INTRAMUSCULAR ONCE
Refills: 0 | Status: DISCONTINUED | OUTPATIENT
Start: 2020-11-03 | End: 2020-11-07

## 2020-11-03 RX ORDER — ISOSORBIDE MONONITRATE 60 MG/1
60 TABLET, EXTENDED RELEASE ORAL DAILY
Refills: 0 | Status: DISCONTINUED | OUTPATIENT
Start: 2020-11-03 | End: 2020-11-09

## 2020-11-03 RX ORDER — SPIRONOLACTONE 25 MG/1
25 TABLET, FILM COATED ORAL DAILY
Refills: 0 | Status: DISCONTINUED | OUTPATIENT
Start: 2020-11-03 | End: 2020-11-05

## 2020-11-03 RX ORDER — GABAPENTIN 400 MG/1
1 CAPSULE ORAL
Qty: 0 | Refills: 0 | DISCHARGE

## 2020-11-03 RX ORDER — DEXTROSE 50 % IN WATER 50 %
25 SYRINGE (ML) INTRAVENOUS ONCE
Refills: 0 | Status: DISCONTINUED | OUTPATIENT
Start: 2020-11-03 | End: 2020-11-09

## 2020-11-03 RX ORDER — SODIUM CHLORIDE 9 MG/ML
1000 INJECTION, SOLUTION INTRAVENOUS
Refills: 0 | Status: DISCONTINUED | OUTPATIENT
Start: 2020-11-03 | End: 2020-11-09

## 2020-11-03 RX ORDER — METOPROLOL TARTRATE 50 MG
25 TABLET ORAL DAILY
Refills: 0 | Status: DISCONTINUED | OUTPATIENT
Start: 2020-11-03 | End: 2020-11-09

## 2020-11-03 RX ORDER — OXYMETAZOLINE HYDROCHLORIDE 0.5 MG/ML
1 SPRAY NASAL ONCE
Refills: 0 | Status: DISCONTINUED | OUTPATIENT
Start: 2020-11-03 | End: 2020-11-09

## 2020-11-03 RX ORDER — GABAPENTIN 400 MG/1
600 CAPSULE ORAL
Refills: 0 | Status: DISCONTINUED | OUTPATIENT
Start: 2020-11-03 | End: 2020-11-06

## 2020-11-03 RX ORDER — TRAMADOL HYDROCHLORIDE 50 MG/1
50 TABLET ORAL EVERY 6 HOURS
Refills: 0 | Status: DISCONTINUED | OUTPATIENT
Start: 2020-11-03 | End: 2020-11-09

## 2020-11-03 RX ORDER — FUROSEMIDE 40 MG
20 TABLET ORAL ONCE
Refills: 0 | Status: COMPLETED | OUTPATIENT
Start: 2020-11-03 | End: 2020-11-03

## 2020-11-03 RX ORDER — GLUCAGON INJECTION, SOLUTION 0.5 MG/.1ML
1 INJECTION, SOLUTION SUBCUTANEOUS ONCE
Refills: 0 | Status: DISCONTINUED | OUTPATIENT
Start: 2020-11-03 | End: 2020-11-09

## 2020-11-03 RX ORDER — MAGNESIUM HYDROXIDE 400 MG/1
30 TABLET, CHEWABLE ORAL DAILY
Refills: 0 | Status: DISCONTINUED | OUTPATIENT
Start: 2020-11-03 | End: 2020-11-09

## 2020-11-03 RX ORDER — SPIRONOLACTONE 25 MG/1
12.5 TABLET, FILM COATED ORAL
Qty: 0 | Refills: 0 | DISCHARGE

## 2020-11-03 RX ADMIN — Medication 25 MILLIGRAM(S): at 14:02

## 2020-11-03 RX ADMIN — Medication 975 MILLIGRAM(S): at 21:30

## 2020-11-03 RX ADMIN — Medication 100 MILLIGRAM(S): at 18:40

## 2020-11-03 RX ADMIN — Medication 975 MILLIGRAM(S): at 17:40

## 2020-11-03 RX ADMIN — SPIRONOLACTONE 25 MILLIGRAM(S): 25 TABLET, FILM COATED ORAL at 18:39

## 2020-11-03 RX ADMIN — Medication 975 MILLIGRAM(S): at 20:56

## 2020-11-03 RX ADMIN — ENOXAPARIN SODIUM 40 MILLIGRAM(S): 100 INJECTION SUBCUTANEOUS at 14:02

## 2020-11-03 RX ADMIN — Medication 975 MILLIGRAM(S): at 14:02

## 2020-11-03 RX ADMIN — Medication 40 MILLIGRAM(S): at 00:31

## 2020-11-03 RX ADMIN — Medication 50 MILLIGRAM(S): at 03:29

## 2020-11-03 RX ADMIN — ATORVASTATIN CALCIUM 10 MILLIGRAM(S): 80 TABLET, FILM COATED ORAL at 20:56

## 2020-11-03 RX ADMIN — GABAPENTIN 600 MILLIGRAM(S): 400 CAPSULE ORAL at 18:42

## 2020-11-03 RX ADMIN — Medication 20 MILLIGRAM(S): at 05:46

## 2020-11-03 RX ADMIN — ISOSORBIDE MONONITRATE 60 MILLIGRAM(S): 60 TABLET, EXTENDED RELEASE ORAL at 14:02

## 2020-11-03 NOTE — CHART NOTE - NSCHARTNOTEFT_GEN_A_CORE
Orthopedics attempted to contact Dr. Hagan (outpatient cardiologist) for cardiac clearance. Called the hospital number and the on call service at [phone]. Was unable to reach patient's own cardiology. Consulted with different cardiologist.

## 2020-11-03 NOTE — CONSULT NOTE ADULT - ASSESSMENT
80 year old female with history of CHF, DVT on eliquis, CAD, HTN, HLD, DM, and breast cancer admitted with dyspnea and right hip pain with MRI showing multiple subchondral fractures in the R hip, awaiting OR later this week:

## 2020-11-03 NOTE — CONSULT NOTE ADULT - SUBJECTIVE AND OBJECTIVE BOX
Orthopaedic Surgery Consult Note    Pt is a 80y Samoan speaking Female presenting with R hip pain s/p mechanical fall 3 weeks ago. Pt interviewed with Samoan . Discussed case with daughter Shauna as well. Pt has been walking since her fall 3 weeks ago with consistent pain. Pt is able to bear weight and walks with assistive devices. Pt presented today to ED for shortness of breath and is being admitted for medical workup. Of note, patient has R TKA done several years ago but cannot remember name of surgeon. Pt is a community ambulator at baseline. Pt denies numbness, tingling, or paresthesias in affected limb. Pt denies headstrike or LOC and denies any other orthopaedic injuries at this time. Pt takes Eliquis 5mg BID for previous DVT but is unsure when her last dose was. Denies fevers, dizziness, CP, SOB, N/V, calf pain.    PMHx:  H/o or current diagnosis of HF- Contraindication to ACEI/ARBs    H/o or current diagnosis of HF- ACEI/ARB contraindication unknown    Family history of heart attack (Child)    Family history of diabetes mellitus in son    Family history of diabetes mellitus in mother    No pertinent family history in first degree relatives    No pertinent family history in first degree relatives    MEWS Score    Renal insufficiency    Elevated blood pressure reading in office with diagnosis of hypertension    DVT of leg (deep venous thrombosis)    CHF (congestive heart failure)    Type II diabetes mellitus    Obese    S/P radiation &gt; 12 weeks    S/P chemotherapy, time since greater than 12 weeks    Rectal cancer    Hyperlipidemia    Lymphedema of arm    Diabetes mellitus    HTN (hypertension)    Breast CA, right    Rectal cancer    Cancer    Lymphedema of arm    Colon cancer    Breast CA, right    Obesity (BMI 35.0-39.9 without comorbidity)    Hyperlipidemia    HTN (hypertension)    DM (diabetes mellitus)    S/P TKR (total knee replacement), right    S/P colon resection    S/P ileostomy    History of appendectomy    S/P mastectomy, right    S/P colectomy    History of tonsillectomy    H/O mastectomy, right    DIFF BREATHING LEG PAIN    90+    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    Labs:                        12.5   8.14  )-----------( 275      ( 2020 22:17 )             39.6         138  |  101  |  22  ----------------------------<  164<H>  4.8   |  24  |  1.36<H>    Ca    9.7      2020 22:17    TPro  7.4  /  Alb  3.9  /  TBili  0.4  /  DBili  x   /  AST  31  /  ALT  14  /  AlkPhos  79      PT/INR - ( 2020 22:17 )   PT: 14.2 sec;   INR: 1.19 ratio         PTT - ( 2020 22:17 )  PTT:33.8 sec  Urinalysis Basic - ( 2020 00:20 )    Color: Yellow / Appearance: Clear / S.018 / pH: x  Gluc: x / Ketone: Negative  / Bili: Negative / Urobili: Negative   Blood: x / Protein: 100 mg/dL / Nitrite: Negative   Leuk Esterase: Negative / RBC: 1 /hpf / WBC 2 /HPF   Sq Epi: x / Non Sq Epi: 1 /hpf / Bacteria: Negative      Imaging:  XR R hip - possible R femoral neck fx  CT R hip - no fracture; significant degenerative changes    Vitals:  T(C): 37 (20 @ 03:00), Max: 37.4 (20 @ 00:07)  HR: 80 (20 @ 03:31) (73 - 87)  BP: 155/63 (20 @ 03:31) (145/82 - 168/92)  RR: 20 (20 @ 03:31) (14 - 22)  SpO2: 97% (20 @ 03:31) (92% - 97%)    Physical Exam:  Gen: NAD, AAOx3    RLE:   Skin intact  No edema, erythema, ecchymosis  No gross deformity  No bony TTP of Knee/Foot/Toes  Unable to SLR  Minimal pain with logroll/heelstrike  +EHL/FHL/TA/GS  SILT L2-S1  +DP/PT Pulses  Compartments soft and compressible  No calf TTP B/L    Secondary Assessment:  NC/AT, NTTP of clavicles, NTTP of C-,T-,L-Spine, NTTP of Pelvis  UEs: NTTP of Shoulders, Elbows, Wrists, Hands; NT with AROM/PROM of Shoulders, Elbows, Wrists, Hands; AIN/PIN/Med/Uln/Msc/Rad/Ax intact  LLE: Able to SLR, NT with Log Roll, NT with Heel Strike, NTTP of Hip, Knee, Ankle, Foot; NT with AROM/PROM of Hip, Knee, Ankle, Foot; Q/H/Gsc/TA/EHL/FHL intact    Pt is a 80y Female with right hip pain; rule out hip fracture    -MRI R hip to further evaluate possible fracture; imaging thus far has been inconclusive  -Pt being admitted to medicine for shortness of breath and medical workup  -Closed fracture, NV intact  -Pain control  -NWB affected extremity  -Bedrest  -Please document medical clearance for possible OR today if imaging positive for hip fracture  -NPO until MRI results obtained  -Hold all chemical DVT prophylaxis  -SCDs  -Will discuss with Dr. Guerrero and advise as plan changes    Chance Perry D.O.  PGY-2 Orthopaedic Surgery

## 2020-11-03 NOTE — H&P ADULT - HISTORY OF PRESENT ILLNESS
Pt is a 80y Vietnamese speaking Female presenting with R hip pain s/p mechanical fall 3 weeks ago. Pt interviewed with Vietnamese . Discussed case with daughter Shauna as well. Pt has been walking since her fall 3 weeks ago with consistent pain. Pt is able to bear weight and walks with assistive devices. Pt presented today to ED for shortness of breath and is being admitted for medical workup. Of note, patient has R TKA done several years ago but cannot remember name of surgeon. Pt is a community ambulator at baseline. Pt denies numbness, tingling, or paresthesias in affected limb. Pt denies headstrike or LOC and denies any other orthopaedic injuries at this time. Pt takes Eliquis 5mg BID for previous DVT but is unsure when her last dose was. Denies fevers, dizziness, CP, SOB, N/V, calf pain.    ROS: 10 point review of systems otherwise negative unless noted in HPI    PMH:  Renal insufficiency    Elevated blood pressure reading in office with diagnosis of hypertension    DVT of leg (deep venous thrombosis)    CHF (congestive heart failure)    Type II diabetes mellitus    Obese    S/P radiation &gt; 12 weeks    S/P chemotherapy, time since greater than 12 weeks    Rectal cancer    Hyperlipidemia    Lymphedema of arm    Diabetes mellitus    HTN (hypertension)    Breast CA, right    Rectal cancer    Cancer    Lymphedema of arm    Colon cancer    Breast CA, right    Obesity (BMI 35.0-39.9 without comorbidity)    Hyperlipidemia    HTN (hypertension)    DM (diabetes mellitus)      PSH:  S/P TKR (total knee replacement), right    S/P colon resection    S/P ileostomy    History of appendectomy    S/P mastectomy, right    S/P colectomy    History of tonsillectomy    H/O mastectomy, right      AH:  penicillin (Angioedema)  CT scan dyes (hives)    Meds: See med rec    T(C): 37 (20 @ 06:53)  HR: 88 (20 @ 06:53)  BP: 160/71 (20 @ 06:53)  RR: 18 (20 @ 06:53)  SpO2: 97% (20 @ 06:53)  Wt(kg): --                        12.6   6.33  )-----------( 277      ( 2020 05:56 )             40.7         135  |  97  |  23  ----------------------------<  221<H>  4.8   |  23  |  1.46<H>    Ca    9.6      2020 05:56    TPro  7.4  /  Alb  3.9  /  TBili  0.4  /  DBili  x   /  AST  31  /  ALT  14  /  AlkPhos  79  11-02    PT/INR - ( 2020 05:56 )   PT: 13.1 sec;   INR: 1.10 ratio         PTT - ( 2020 05:56 )  PTT:31.4 sec  Urinalysis Basic - ( 2020 00:20 )    Color: Yellow / Appearance: Clear / S.018 / pH: x  Gluc: x / Ketone: Negative  / Bili: Negative / Urobili: Negative   Blood: x / Protein: 100 mg/dL / Nitrite: Negative   Leuk Esterase: Negative / RBC: 1 /hpf / WBC 2 /HPF   Sq Epi: x / Non Sq Epi: 1 /hpf / Bacteria: Negative          Physical Exam:  Gen: NAD, AAOx3, Vietnamese speaking     RLE:   Skin intact  No edema, erythema, ecchymosis  No gross deformity  No bony TTP of Knee/Foot/Toes  Unable to SLR  Minimal pain with logroll/heelstrike  +EHL/FHL/TA/GS  SILT L2-S1  +DP/PT Pulses  Compartments soft and compressible  No calf TTP B/L    Secondary Assessment:  NC/AT, NTTP of clavicles, NTTP of C-,T-,L-Spine, NTTP of Pelvis  UEs: NTTP of Shoulders, Elbows, Wrists, Hands; NT with AROM/PROM of Shoulders, Elbows, Wrists, Hands; AIN/PIN/Med/Uln/Msc/Rad/Ax intact  LLE: Able to SLR, NT with Log Roll, NT with Heel Strike, NTTP of Hip, Knee, Ankle, Foot; NT with AROM/PROM of Hip, Knee, Ankle, Foot; Q/H/Gsc/TA/EHL/FHL intact    Imaging:  XR demonstrating R hip osteoarthritis  CT and MRI of R hip shows osteoarthritis of the R hip    Pt is a 80y Female with right hip osteoarthritis exacerbation; rule out hip fracture. MRI shows no acute fractures of the right hip.     -Admit to ortho under Dr. Guerrero  -Pain control  -WBAT RLE with ambulatory assist devices as needed  -Please document medical clearance for OR   -NPO with IVF on Wednesday for OR on 20  -SCDs  -Will discuss with Dr. Guerrero and advise as plan changes

## 2020-11-03 NOTE — H&P ADULT - ATTENDING COMMENTS
Pt sever R hip DJD with declining function over the past year.  MR shows multiple subchondral fractures R hip.  MARLA offered as a way to decrease pain and restore function.  The pt wishes to proceed with MARLA due to severe, progressive hip pain that has deminished her ability to perform ADLs and has had a huge negative impact on her quality of life. R/B/A discussed

## 2020-11-03 NOTE — CONSULT NOTE ADULT - SUBJECTIVE AND OBJECTIVE BOX
HPI: 80 year old female with history of CHF, DVT on eliquis, CAD, HTN, HLD, DM, and breast cancer admitted with dyspnea and right hip pain. The patient reports a fall about 3 weeks ago. She also has chronic R leg swelling, with increased swelling recently. She also had a recent UTI that was treated as an outpatient. She has had dyspnea that has worsened over the past week. She denies any overt chest pain or chest pressure. She denies fevers, chills, night sweats, nausea, vomiting, diarrhea. She continues to have hip and RLE pain at rest, worse with movement.     PMH:   -CHF (2019 echo with moderate diastolic dysfunction)  -Breast CA (s/p R mastectomy and chemo)  -DVT diagnosed  and on Eliquis for AC  -HLD  -HTN  -H/o rectal cancer s/p chemo and radiation in   -DM T2  -Renal insufficiency     PSH:   -appendectomy   -s/p colon resection  -s/p mastectomy   -s/p R TKR   -s/p ileostomy with reversal       SH:  -denies alcohol/smoking     MEDICATIONS  (STANDING):  acetaminophen   Tablet .. 975 milliGRAM(s) Oral every 8 hours  atorvastatin 10 milliGRAM(s) Oral at bedtime  buMETAnide 1 milliGRAM(s) Oral two times a day  enoxaparin Injectable 40 milliGRAM(s) SubCutaneous every 24 hours  gabapentin 600 milliGRAM(s) Oral two times a day  hydrALAZINE 100 milliGRAM(s) Oral two times a day  influenza   Vaccine 0.5 milliLiter(s) IntraMuscular once  isosorbide   mononitrate ER Tablet (IMDUR) 60 milliGRAM(s) Oral daily  metoprolol succinate ER 25 milliGRAM(s) Oral daily  senna 2 Tablet(s) Oral at bedtime  spironolactone 25 milliGRAM(s) Oral daily    MEDICATIONS  (PRN):  magnesium hydroxide Suspension 30 milliLiter(s) Oral daily PRN Constipation  melatonin 3 milliGRAM(s) Oral at bedtime PRN Insomnia  oxyCODONE    IR 2.5 milliGRAM(s) Oral every 4 hours PRN Moderate Pain (4 - 6)  oxyCODONE    IR 5 milliGRAM(s) Oral every 4 hours PRN Severe Pain (7 - 10)  oxymetazoline 0.05% Nasal Spray 1 Spray(s) Both Nostrils once PRN Nasal Congestion  traMADol 50 milliGRAM(s) Oral every 6 hours PRN Mild Pain (1 - 3)      Vital Signs Last 24 Hrs  T(C): 37 (2020 06:53), Max: 37.4 (2020 00:07)  T(F): 98.6 (2020 06:53), Max: 99.3 (2020 00:07)  HR: 88 (2020 06:53) (73 - 88)  BP: 160/71 (2020 06:53) (145/82 - 174/73)  BP(mean): 103 (2020 05:45) (88 - 103)  RR: 18 (2020 06:53) (14 - 22)  SpO2: 97% (2020 06:53) (92% - 97%)  CAPILLARY BLOOD GLUCOSE    PHYSICAL EXAM:  GENERAL: NAD  EYES: conjunctiva and sclera clear  CHEST/LUNG: no wheezing   HEART: +s1/S2, reg   ABDOMEN: Soft, Nontender, Nondistended; Bowel sounds present  EXTREMITIES: trace edema in the LE b/l   PSYCH: AAOx3      LABS:                        12.6   6.33  )-----------( 277      ( 2020 05:56 )             40.7     11-03    135  |  97  |  23  ----------------------------<  221<H>  4.8   |  23  |  1.46<H>    Ca    9.6      2020 05:56    TPro  7.4  /  Alb  3.9  /  TBili  0.4  /  DBili  x   /  AST  31  /  ALT  14  /  AlkPhos  79  11-02    PT/INR - ( 2020 05:56 )   PT: 13.1 sec;   INR: 1.10 ratio         PTT - ( 2020 05:56 )  PTT:31.4 sec      Urinalysis Basic - ( 2020 00:20 )    Color: Yellow / Appearance: Clear / S.018 / pH: x  Gluc: x / Ketone: Negative  / Bili: Negative / Urobili: Negative   Blood: x / Protein: 100 mg/dL / Nitrite: Negative   Leuk Esterase: Negative / RBC: 1 /hpf / WBC 2 /HPF   Sq Epi: x / Non Sq Epi: 1 /hpf / Bacteria: Negative

## 2020-11-03 NOTE — ED ADULT NURSE REASSESSMENT NOTE - NS ED NURSE REASSESS COMMENT FT1
Patient brought to CT scan with RN at bedside. Patient tolerated well, c/o SOB and pain in right leg when laying flat. Will continue to monitor.

## 2020-11-04 LAB
24R-OH-CALCIDIOL SERPL-MCNC: 40.4 NG/ML — SIGNIFICANT CHANGE UP (ref 30–80)
A1C WITH ESTIMATED AVERAGE GLUCOSE RESULT: 7 % — HIGH (ref 4–5.6)
ALBUMIN SERPL ELPH-MCNC: 3.6 G/DL — SIGNIFICANT CHANGE UP (ref 3.3–5)
ANION GAP SERPL CALC-SCNC: 12 MMOL/L — SIGNIFICANT CHANGE UP (ref 5–17)
APTT BLD: 29.8 SEC — SIGNIFICANT CHANGE UP (ref 27.5–35.5)
BASOPHILS # BLD AUTO: 0.02 K/UL — SIGNIFICANT CHANGE UP (ref 0–0.2)
BASOPHILS NFR BLD AUTO: 0.3 % — SIGNIFICANT CHANGE UP (ref 0–2)
BUN SERPL-MCNC: 29 MG/DL — HIGH (ref 7–23)
CALCIUM SERPL-MCNC: 9.3 MG/DL — SIGNIFICANT CHANGE UP (ref 8.4–10.5)
CHLORIDE SERPL-SCNC: 101 MMOL/L — SIGNIFICANT CHANGE UP (ref 96–108)
CO2 SERPL-SCNC: 26 MMOL/L — SIGNIFICANT CHANGE UP (ref 22–31)
CREAT SERPL-MCNC: 1.65 MG/DL — HIGH (ref 0.5–1.3)
CULTURE RESULTS: SIGNIFICANT CHANGE UP
EOSINOPHIL # BLD AUTO: 0.03 K/UL — SIGNIFICANT CHANGE UP (ref 0–0.5)
EOSINOPHIL NFR BLD AUTO: 0.5 % — SIGNIFICANT CHANGE UP (ref 0–6)
ESTIMATED AVERAGE GLUCOSE: 154 MG/DL — HIGH (ref 68–114)
GLUCOSE BLDC GLUCOMTR-MCNC: 129 MG/DL — HIGH (ref 70–99)
GLUCOSE BLDC GLUCOMTR-MCNC: 151 MG/DL — HIGH (ref 70–99)
GLUCOSE BLDC GLUCOMTR-MCNC: 159 MG/DL — HIGH (ref 70–99)
GLUCOSE BLDC GLUCOMTR-MCNC: 240 MG/DL — HIGH (ref 70–99)
GLUCOSE BLDC GLUCOMTR-MCNC: 267 MG/DL — HIGH (ref 70–99)
GLUCOSE BLDC GLUCOMTR-MCNC: 274 MG/DL — HIGH (ref 70–99)
GLUCOSE SERPL-MCNC: 109 MG/DL — HIGH (ref 70–99)
HCT VFR BLD CALC: 35.6 % — SIGNIFICANT CHANGE UP (ref 34.5–45)
HGB BLD-MCNC: 10.9 G/DL — LOW (ref 11.5–15.5)
IMM GRANULOCYTES NFR BLD AUTO: 0.3 % — SIGNIFICANT CHANGE UP (ref 0–1.5)
INR BLD: 1 RATIO — SIGNIFICANT CHANGE UP (ref 0.88–1.16)
LYMPHOCYTES # BLD AUTO: 1.29 K/UL — SIGNIFICANT CHANGE UP (ref 1–3.3)
LYMPHOCYTES # BLD AUTO: 19.7 % — SIGNIFICANT CHANGE UP (ref 13–44)
MCHC RBC-ENTMCNC: 29.4 PG — SIGNIFICANT CHANGE UP (ref 27–34)
MCHC RBC-ENTMCNC: 30.6 GM/DL — LOW (ref 32–36)
MCV RBC AUTO: 96 FL — SIGNIFICANT CHANGE UP (ref 80–100)
MONOCYTES # BLD AUTO: 0.68 K/UL — SIGNIFICANT CHANGE UP (ref 0–0.9)
MONOCYTES NFR BLD AUTO: 10.4 % — SIGNIFICANT CHANGE UP (ref 2–14)
NEUTROPHILS # BLD AUTO: 4.52 K/UL — SIGNIFICANT CHANGE UP (ref 1.8–7.4)
NEUTROPHILS NFR BLD AUTO: 68.8 % — SIGNIFICANT CHANGE UP (ref 43–77)
NRBC # BLD: 0 /100 WBCS — SIGNIFICANT CHANGE UP (ref 0–0)
PLATELET # BLD AUTO: 249 K/UL — SIGNIFICANT CHANGE UP (ref 150–400)
POTASSIUM SERPL-MCNC: 3.9 MMOL/L — SIGNIFICANT CHANGE UP (ref 3.5–5.3)
POTASSIUM SERPL-SCNC: 3.9 MMOL/L — SIGNIFICANT CHANGE UP (ref 3.5–5.3)
PROTHROM AB SERPL-ACNC: 11.8 SEC — SIGNIFICANT CHANGE UP (ref 10.6–13.6)
RBC # BLD: 3.71 M/UL — LOW (ref 3.8–5.2)
RBC # FLD: 15.5 % — HIGH (ref 10.3–14.5)
SODIUM SERPL-SCNC: 139 MMOL/L — SIGNIFICANT CHANGE UP (ref 135–145)
SPECIMEN SOURCE: SIGNIFICANT CHANGE UP
WBC # BLD: 6.56 K/UL — SIGNIFICANT CHANGE UP (ref 3.8–10.5)
WBC # FLD AUTO: 6.56 K/UL — SIGNIFICANT CHANGE UP (ref 3.8–10.5)

## 2020-11-04 PROCEDURE — 99232 SBSQ HOSP IP/OBS MODERATE 35: CPT | Mod: GC

## 2020-11-04 RX ORDER — INSULIN LISPRO 100/ML
VIAL (ML) SUBCUTANEOUS EVERY 6 HOURS
Refills: 0 | Status: DISCONTINUED | OUTPATIENT
Start: 2020-11-04 | End: 2020-11-06

## 2020-11-04 RX ORDER — HEPARIN SODIUM 5000 [USP'U]/ML
5000 INJECTION INTRAVENOUS; SUBCUTANEOUS EVERY 8 HOURS
Refills: 0 | Status: COMPLETED | OUTPATIENT
Start: 2020-11-04 | End: 2020-11-04

## 2020-11-04 RX ORDER — ENOXAPARIN SODIUM 100 MG/ML
40 INJECTION SUBCUTANEOUS ONCE
Refills: 0 | Status: DISCONTINUED | OUTPATIENT
Start: 2020-11-04 | End: 2020-11-04

## 2020-11-04 RX ADMIN — Medication 100 MILLIGRAM(S): at 05:26

## 2020-11-04 RX ADMIN — Medication 100 MILLIGRAM(S): at 18:06

## 2020-11-04 RX ADMIN — Medication 1: at 12:47

## 2020-11-04 RX ADMIN — HEPARIN SODIUM 5000 UNIT(S): 5000 INJECTION INTRAVENOUS; SUBCUTANEOUS at 21:58

## 2020-11-04 RX ADMIN — Medication 975 MILLIGRAM(S): at 21:58

## 2020-11-04 RX ADMIN — SPIRONOLACTONE 25 MILLIGRAM(S): 25 TABLET, FILM COATED ORAL at 05:30

## 2020-11-04 RX ADMIN — Medication 1: at 09:25

## 2020-11-04 RX ADMIN — GABAPENTIN 600 MILLIGRAM(S): 400 CAPSULE ORAL at 18:06

## 2020-11-04 RX ADMIN — Medication 975 MILLIGRAM(S): at 13:59

## 2020-11-04 RX ADMIN — Medication 975 MILLIGRAM(S): at 14:30

## 2020-11-04 RX ADMIN — ATORVASTATIN CALCIUM 10 MILLIGRAM(S): 80 TABLET, FILM COATED ORAL at 21:58

## 2020-11-04 RX ADMIN — BUMETANIDE 1 MILLIGRAM(S): 0.25 INJECTION INTRAMUSCULAR; INTRAVENOUS at 05:26

## 2020-11-04 RX ADMIN — Medication 2: at 18:15

## 2020-11-04 RX ADMIN — Medication 50 MILLIGRAM(S): at 21:58

## 2020-11-04 RX ADMIN — OXYCODONE HYDROCHLORIDE 5 MILLIGRAM(S): 5 TABLET ORAL at 12:28

## 2020-11-04 RX ADMIN — Medication 25 MILLIGRAM(S): at 05:26

## 2020-11-04 RX ADMIN — Medication 975 MILLIGRAM(S): at 05:26

## 2020-11-04 RX ADMIN — ISOSORBIDE MONONITRATE 60 MILLIGRAM(S): 60 TABLET, EXTENDED RELEASE ORAL at 12:10

## 2020-11-04 RX ADMIN — OXYCODONE HYDROCHLORIDE 5 MILLIGRAM(S): 5 TABLET ORAL at 13:00

## 2020-11-04 RX ADMIN — BUMETANIDE 1 MILLIGRAM(S): 0.25 INJECTION INTRAMUSCULAR; INTRAVENOUS at 19:47

## 2020-11-04 RX ADMIN — Medication 975 MILLIGRAM(S): at 22:30

## 2020-11-04 RX ADMIN — Medication 975 MILLIGRAM(S): at 06:00

## 2020-11-04 RX ADMIN — Medication 1: at 21:58

## 2020-11-04 RX ADMIN — Medication 50 MILLIGRAM(S): at 13:59

## 2020-11-04 RX ADMIN — GABAPENTIN 600 MILLIGRAM(S): 400 CAPSULE ORAL at 05:26

## 2020-11-04 NOTE — CONSULT NOTE ADULT - SUBJECTIVE AND OBJECTIVE BOX
CHIEF COMPLAINT:Patient is a 80y old  Female who presents with a chief complaint of Right Hip Pain difficulty ambulating (04 Nov 2020 06:17)      HISTORY OF PRESENT ILLNESS:    80 pleasant woman with history as below   HTN, DM II, breast ca, DVT on A/c  presents with acute onset of SOB about 1 hour prior to arrival with no associated chest pain, n/v, diaphoresis. Pt had fallen about 3 weeks ago and has been complaining of right wrist and right hip pain noted to have right hip fx planned for ORIF  cardiology is called for Pre-Operative Cardiac Risk Stratification and Optimization   pt now feels well   denies any chest pain, sob, palpitation, dizziness or syncope.       PAST MEDICAL & SURGICAL HISTORY:  Renal insufficiency    DVT of leg (deep venous thrombosis)  right calf DVT  2/2019 pt on Eliquis    CHF (congestive heart failure)    Type II diabetes mellitus    Obese    Rectal cancer  s/p chemotherapy and  radiation 12 weeks 2/2015 -3/2015    Hyperlipidemia    Lymphedema of arm  right arm s/p mastectomy    HTN (hypertension)    Breast CA, right  1989 s/p mastectomy ,IV Chemotherapy    S/P TKR (total knee replacement), right  2017    S/P colon resection  2015    S/P ileostomy  low anterior resection-8/10/15, reversal of ileostomy 2016    History of appendectomy  1953    S/P mastectomy, right  1989            MEDICATIONS:  buMETAnide 1 milliGRAM(s) Oral two times a day  enoxaparin Injectable 40 milliGRAM(s) SubCutaneous once  hydrALAZINE 100 milliGRAM(s) Oral two times a day  isosorbide   mononitrate ER Tablet (IMDUR) 60 milliGRAM(s) Oral daily  metoprolol succinate ER 25 milliGRAM(s) Oral daily  spironolactone 25 milliGRAM(s) Oral daily        acetaminophen   Tablet .. 975 milliGRAM(s) Oral every 8 hours  gabapentin 600 milliGRAM(s) Oral two times a day  melatonin 3 milliGRAM(s) Oral at bedtime PRN  oxyCODONE    IR 2.5 milliGRAM(s) Oral every 4 hours PRN  oxyCODONE    IR 5 milliGRAM(s) Oral every 4 hours PRN  traMADol 50 milliGRAM(s) Oral every 6 hours PRN    magnesium hydroxide Suspension 30 milliLiter(s) Oral daily PRN  senna 2 Tablet(s) Oral at bedtime    atorvastatin 10 milliGRAM(s) Oral at bedtime  dextrose 40% Gel 15 Gram(s) Oral once PRN  dextrose 50% Injectable 12.5 Gram(s) IV Push once  dextrose 50% Injectable 25 Gram(s) IV Push once  dextrose 50% Injectable 25 Gram(s) IV Push once  glucagon  Injectable 1 milliGRAM(s) IntraMuscular once PRN  insulin lispro (ADMELOG) corrective regimen sliding scale   SubCutaneous three times a day before meals  insulin lispro (ADMELOG) corrective regimen sliding scale   SubCutaneous at bedtime    dextrose 5%. 1000 milliLiter(s) IV Continuous <Continuous>  influenza   Vaccine 0.5 milliLiter(s) IntraMuscular once  oxymetazoline 0.05% Nasal Spray 1 Spray(s) Both Nostrils once PRN      FAMILY HISTORY:  Family history of heart attack (Child)    Family history of diabetes mellitus in son    Family history of diabetes mellitus in mother        Non-contributory    SOCIAL HISTORY:    No tobacco, drugs or etoh    Allergies    CT scan dye (Hives)  penicillin (Angioedema)    Intolerances    	    REVIEW OF SYSTEMS:  as above  The rest of the 14 points ROS reviewed and except above they are unremarkable.        PHYSICAL EXAM:  T(C): 36.7 (11-04-20 @ 04:55), Max: 37.1 (11-03-20 @ 18:35)  HR: 67 (11-04-20 @ 04:55) (67 - 88)  BP: 110/54 (11-04-20 @ 04:55) (109/51 - 160/71)  RR: 18 (11-04-20 @ 04:55) (18 - 18)  SpO2: 93% (11-04-20 @ 04:55) (93% - 97%)  Wt(kg): --  I&O's Summary    03 Nov 2020 07:01  -  04 Nov 2020 06:48  --------------------------------------------------------  IN: 370 mL / OUT: 202 mL / NET: 168 mL        JVP: Normal  Neck: supple  Lung: clear   CV: S1 S2 , Murmur:  Abd: soft  Ext: No edema  neuro: Awake / alert  Psych: flat affect  Skin: normal      LABS/DATA:    TELEMETRY: 	    ECG:  	Sinus , old anteroseptal MI    	  CARDIAC MARKERS:                        26 <<== 11-03-20 @ 02:56                24 <<== 11-02-20 @ 22:17      < from: Transthoracic Echocardiogram (12.10.19 @ 10:30) >  Conclusions:  1. Mitral annular calcification, otherwise normal mitral  valve. Mild mitral regurgitation.  2. Calcified trileaflet aortic valve with decreased  opening. Peak transaortic valve gradient equals 18 mm Hg,  mean transaortic valve gradient equals 12 mm Hg, estimated  aortic valve area equals 1.4 sqcm (by continuity equation),  aortic valve velocity time integral equals 46 cm,  consistent with moderate aortic stenosis. No aortic valve  regurgitation seen.  3. Normal left ventricular systolic function. No segmental  wall motion abnormalities.  4. Moderate diastolic dysfunction (Stage II).  5. Normal right ventricular size and function.  *** Compared with echocardiogram of 12/10/2018, there has  been interval progressionof the aortic valve disease.  ------------------------------------------------------------------------  Confirmed on  12/10/2019 - 12:33:16 by Sushant Sewell M.D.  ------------------------------------------------------------------------    < end of copied text >                          12.6   6.33  )-----------( 277      ( 03 Nov 2020 05:56 )             40.7     11-03    135  |  97  |  23  ----------------------------<  221<H>  4.8   |  23  |  1.46<H>    Ca    9.6      03 Nov 2020 05:56    TPro  7.4  /  Alb  3.9  /  TBili  0.4  /  DBili  x   /  AST  31  /  ALT  14  /  AlkPhos  79  11-02    proBNP: Serum Pro-Brain Natriuretic Peptide: 2132 pg/mL (11-02 @ 22:17)    Lipid Profile:   HgA1c:   TSH:       < from: Nuclear Stress Test-Pharmacologic (05.22.19 @ 15:03) >  IMPRESSIONS:Abnormal Study  * Chest Pain: No chest pain with administration of  Regadenoson.  * Symptom: Shortness of breath.  * HR Response: Appropriate.  * BP Response: Appropriate.  * Heart Rhythm: Sinus Rhythm - 63 BPM.  * Q Waves: Anteroseptal MI.  * Baseline ECG: Nonspecific ST-T wave abnormality.  * ECG Changes: No significant ischemic ST segment changes  beyond baseline abnormalities.  * Arrhythmia: None.  * Review of raw data shows: Extensive splanchnic uptake  * The left ventricle was normal in size. There is a small,  moderate defect in the apex that is mostly reversible  suggestive of ischemia with a small area of infarct.  * There are medium-sized, moderate defects in the basal to  mid inferolateral, basal to mid inferior, and mid to  distal inferseptal walls that are partially reversible  suggestive of infarct with moderate chase-infarct ischemia.  * There is a small, moderate to severe defect in the basal  anteroseptal wall that is mostly fixed suggestive of  infarct with mild chase-infarct ischemia.  * Post-stress gated wall motion analysis was performed  (LVEF = 65 %;LVEDV = 92 ml.) revealing hypokinesis of the  basal anteroseptum and reduced systolic thickening of the  basal to mid inferior, basal to mid inferolateral, mid to  distal inferoseptal, and apical walls.  *** No previous Nuclear/Stress exam.  ------------------------------------------------------------------------  Confirmed on  5/22/2019 - 17:50:20 by Luciano Chaparro M.D.  ------------------------------------------------------------------------    < end of copied text >       CHIEF COMPLAINT:Patient is a 80y old  Female who presents with a chief complaint of Right Hip Pain difficulty ambulating (04 Nov 2020 06:17)      HISTORY OF PRESENT ILLNESS:    80 pleasant woman with history as below   HTN, DM II, breast ca, DVT on A/c  presents with acute onset of SOB about 1 hour prior to arrival with no associated chest pain, n/v, diaphoresis. Pt had fallen about 3 weeks ago and has been complaining of right wrist and right hip pain noted to have right hip significant degenerative disease planned for MARLA  cardiology is called for Pre-Operative Cardiac Risk Stratification and Optimization   pt now feels well now   denies any chest pain, sob, palpitation, dizziness or syncope.       PAST MEDICAL & SURGICAL HISTORY:  Renal insufficiency    DVT of leg (deep venous thrombosis)  right calf DVT  2/2019 pt on Eliquis    CHF (congestive heart failure)    Type II diabetes mellitus    Obese    Rectal cancer  s/p chemotherapy and  radiation 12 weeks 2/2015 -3/2015    Hyperlipidemia    Lymphedema of arm  right arm s/p mastectomy    HTN (hypertension)    Breast CA, right  1989 s/p mastectomy ,IV Chemotherapy    S/P TKR (total knee replacement), right  2017    S/P colon resection  2015    S/P ileostomy  low anterior resection-8/10/15, reversal of ileostomy 2016    History of appendectomy  1953    S/P mastectomy, right  1989            MEDICATIONS:  buMETAnide 1 milliGRAM(s) Oral two times a day  enoxaparin Injectable 40 milliGRAM(s) SubCutaneous once  hydrALAZINE 100 milliGRAM(s) Oral two times a day  isosorbide   mononitrate ER Tablet (IMDUR) 60 milliGRAM(s) Oral daily  metoprolol succinate ER 25 milliGRAM(s) Oral daily  spironolactone 25 milliGRAM(s) Oral daily        acetaminophen   Tablet .. 975 milliGRAM(s) Oral every 8 hours  gabapentin 600 milliGRAM(s) Oral two times a day  melatonin 3 milliGRAM(s) Oral at bedtime PRN  oxyCODONE    IR 2.5 milliGRAM(s) Oral every 4 hours PRN  oxyCODONE    IR 5 milliGRAM(s) Oral every 4 hours PRN  traMADol 50 milliGRAM(s) Oral every 6 hours PRN    magnesium hydroxide Suspension 30 milliLiter(s) Oral daily PRN  senna 2 Tablet(s) Oral at bedtime    atorvastatin 10 milliGRAM(s) Oral at bedtime  dextrose 40% Gel 15 Gram(s) Oral once PRN  dextrose 50% Injectable 12.5 Gram(s) IV Push once  dextrose 50% Injectable 25 Gram(s) IV Push once  dextrose 50% Injectable 25 Gram(s) IV Push once  glucagon  Injectable 1 milliGRAM(s) IntraMuscular once PRN  insulin lispro (ADMELOG) corrective regimen sliding scale   SubCutaneous three times a day before meals  insulin lispro (ADMELOG) corrective regimen sliding scale   SubCutaneous at bedtime    dextrose 5%. 1000 milliLiter(s) IV Continuous <Continuous>  influenza   Vaccine 0.5 milliLiter(s) IntraMuscular once  oxymetazoline 0.05% Nasal Spray 1 Spray(s) Both Nostrils once PRN      FAMILY HISTORY:  Family history of heart attack (Child)    Family history of diabetes mellitus in son    Family history of diabetes mellitus in mother        Non-contributory    SOCIAL HISTORY:    No tobacco, drugs or etoh    Allergies    CT scan dye (Hives)  penicillin (Angioedema)    Intolerances    	    REVIEW OF SYSTEMS:  as above  The rest of the 14 points ROS reviewed and except above they are unremarkable.        PHYSICAL EXAM:  T(C): 36.7 (11-04-20 @ 04:55), Max: 37.1 (11-03-20 @ 18:35)  HR: 67 (11-04-20 @ 04:55) (67 - 88)  BP: 110/54 (11-04-20 @ 04:55) (109/51 - 160/71)  RR: 18 (11-04-20 @ 04:55) (18 - 18)  SpO2: 93% (11-04-20 @ 04:55) (93% - 97%)  Wt(kg): --  I&O's Summary    03 Nov 2020 07:01  -  04 Nov 2020 06:48  --------------------------------------------------------  IN: 370 mL / OUT: 202 mL / NET: 168 mL        JVP: Normal  Neck: supple  Lung: clear   CV: S1 S2 , Murmur: pos broderick   Abd: soft  Ext: No edema  neuro: Awake / alert  Psych: flat affect  Skin: normal      LABS/DATA:    TELEMETRY: 	    ECG:  	Sinus , old anteroseptal MI    	  CARDIAC MARKERS:                        26 <<== 11-03-20 @ 02:56                24 <<== 11-02-20 @ 22:17      < from: Transthoracic Echocardiogram (12.10.19 @ 10:30) >  Conclusions:  1. Mitral annular calcification, otherwise normal mitral  valve. Mild mitral regurgitation.  2. Calcified trileaflet aortic valve with decreased  opening. Peak transaortic valve gradient equals 18 mm Hg,  mean transaortic valve gradient equals 12 mm Hg, estimated  aortic valve area equals 1.4 sqcm (by continuity equation),  aortic valve velocity time integral equals 46 cm,  consistent with moderate aortic stenosis. No aortic valve  regurgitation seen.  3. Normal left ventricular systolic function. No segmental  wall motion abnormalities.  4. Moderate diastolic dysfunction (Stage II).  5. Normal right ventricular size and function.  *** Compared with echocardiogram of 12/10/2018, there has  been interval progressionof the aortic valve disease.  ------------------------------------------------------------------------  Confirmed on  12/10/2019 - 12:33:16 by Sushant Sewell M.D.  ------------------------------------------------------------------------    < end of copied text >                          12.6   6.33  )-----------( 277      ( 03 Nov 2020 05:56 )             40.7     11-03    135  |  97  |  23  ----------------------------<  221<H>  4.8   |  23  |  1.46<H>    Ca    9.6      03 Nov 2020 05:56    TPro  7.4  /  Alb  3.9  /  TBili  0.4  /  DBili  x   /  AST  31  /  ALT  14  /  AlkPhos  79  11-02    proBNP: Serum Pro-Brain Natriuretic Peptide: 2132 pg/mL (11-02 @ 22:17)    Lipid Profile:   HgA1c:   TSH:       < from: Nuclear Stress Test-Pharmacologic (05.22.19 @ 15:03) >  IMPRESSIONS:Abnormal Study  * Chest Pain: No chest pain with administration of  Regadenoson.  * Symptom: Shortness of breath.  * HR Response: Appropriate.  * BP Response: Appropriate.  * Heart Rhythm: Sinus Rhythm - 63 BPM.  * Q Waves: Anteroseptal MI.  * Baseline ECG: Nonspecific ST-T wave abnormality.  * ECG Changes: No significant ischemic ST segment changes  beyond baseline abnormalities.  * Arrhythmia: None.  * Review of raw data shows: Extensive splanchnic uptake  * The left ventricle was normal in size. There is a small,  moderate defect in the apex that is mostly reversible  suggestive of ischemia with a small area of infarct.  * There are medium-sized, moderate defects in the basal to  mid inferolateral, basal to mid inferior, and mid to  distal inferseptal walls that are partially reversible  suggestive of infarct with moderate chase-infarct ischemia.  * There is a small, moderate to severe defect in the basal  anteroseptal wall that is mostly fixed suggestive of  infarct with mild chase-infarct ischemia.  * Post-stress gated wall motion analysis was performed  (LVEF = 65 %;LVEDV = 92 ml.) revealing hypokinesis of the  basal anteroseptum and reduced systolic thickening of the  basal to mid inferior, basal to mid inferolateral, mid to  distal inferoseptal, and apical walls.  *** No previous Nuclear/Stress exam.  ------------------------------------------------------------------------  Confirmed on  5/22/2019 - 17:50:20 by Luciano Chaparro M.D.  ------------------------------------------------------------------------    < end of copied text >

## 2020-11-04 NOTE — PROGRESS NOTE ADULT - SUBJECTIVE AND OBJECTIVE BOX
Pt S/E at bedside, no acute events overnight, pain controlled. Patient presented with 2-3 weeks of increasing hip pain after a fall from height and difficulty with weight bearing. Patient was worked up to rule out fracture and found to have severe degenerative changes of the right hip likely contributing to her pain and symptoms.     Vital Signs Last 24 Hrs  T(C): 36.7 (04 Nov 2020 04:55), Max: 37.1 (03 Nov 2020 18:35)  T(F): 98 (04 Nov 2020 04:55), Max: 98.8 (03 Nov 2020 18:35)  HR: 67 (04 Nov 2020 04:55) (67 - 88)  BP: 110/54 (04 Nov 2020 04:55) (109/51 - 160/71)  BP(mean): --  RR: 18 (04 Nov 2020 04:55) (18 - 18)  SpO2: 93% (04 Nov 2020 04:55) (93% - 97%)    Gen: NAD, AAOx3    RLE:  Dressing clean dry intact  +EHL/FHL/TA/GS  SILT L3-S1  +DP/PT Pulses  Compartments soft  No calf TTP B/L

## 2020-11-04 NOTE — PROGRESS NOTE ADULT - ATTENDING COMMENTS
Pt indicated for HA.  Awaiting medical and cardiology clearance.  Pt will likely need cardiac cath Pt indicated for MARLA.  Awaiting medical and cardiology clearance.  Pt will likely need cardiac cath

## 2020-11-04 NOTE — CONSULT NOTE ADULT - SUBJECTIVE AND OBJECTIVE BOX
HPI: Ms. Batista is an 80 year-old woman with history of multiple medical issues including type 2 diabetes mellitus, hypertension, stage 3a chronic kidney disease, and congestive heart failure with preserved EF. She presented yesterday to the [facility] ER with right hip pain s/p mechanical fall 3 weeks ago. She was diagnosed by imaging with a right hip fracture. She is planned for surgical repair today.  Ms. Batista is well-known to my partner, Dr. Igor Kan.  PMHx/PSHx: HTN HLD DM2 CKD3 DVT Diastolic CHF Appendicitis-appendectomy Rectal cancer -s/p chemo/RT/colon resection+ileostomy R breast CA - s/p mastectomy Tonsillectomy TKR-R  Allergies: penicillin (Angioedema) CT scan dyes (hives)  SOCIAL HISTORY: Denies ETOh,Smoking,   FAMILY HISTORY: Family history of heart attack (Child) Family history of diabetes mellitus in son Family history of diabetes mellitus in mother  REVIEW OF SYSTEMS: CONSTITUTIONAL: No weakness, fevers or chills EYES/ENT: No visual changes;  No vertigo or throat pain  NECK: No pain or stiffness RESPIRATORY: No cough, wheezing, hemoptysis; No shortness of breath CARDIOVASCULAR: No chest pain or palpitations GASTROINTESTINAL: No abdominal or epigastric pain. No nausea, vomiting, or hematemesis; No diarrhea or constipation. No melena or hematochezia. GENITOURINARY: No dysuria, frequency or hematuria NEUROLOGICAL: No numbness or weakness MUSCULOSKELETAL: (+)right hip pain SKIN: No itching, burning, rashes, or lesions  All other review of systems is negative unless indicated above.  VITAL: T(C): , Max: 37.1 (20 @ 18:35) T(F): , Max: 98.8 (20 @ 18:35) HR: 67 (20 @ 04:55) BP: 110/54 (20 @ 04:55) RR: 18 (20 @ 04:55) SpO2: 93% (20 @ 04:55)  PHYSICAL EXAM: Constitutional: NAD, Alert HEENT: NCAT, MMM Neck: Supple, No JVD Respiratory: CTA-b/l Cardiovascular: RRR s1s2, no m/r/g Gastrointestinal: BS+, soft, NT/ND Extremities: No peripheral edema b/l Neurological: no focal deficits; strength grossly intact Back: no CVAT b/l Skin: No rashes, no nevi  LABS:                      10.9  6.56  )-----------( 249      ( 2020 06:37 )            35.6   Na(139)/K(3.9)/Cl(101)/HCO3(26)/BUN(29)/Cr(1.65)Glu(109)/Ca(9.3)/Mg(--)/PO4(--)     @ 06:37 Na(135)/K(4.8)/Cl(97)/HCO3(23)/BUN(23)/Cr(1.46)Glu(221)/Ca(9.6)/Mg(--)/PO4(--)     @ 05:56 Na(138)/K(4.8)/Cl(101)/HCO3(24)/BUN(22)/Cr(1.36)Glu(164)/Ca(9.7)/Mg(--)/PO4(--)     @ 22:17  Urinalysis Basic - ( 2020 00:20 ) Color: Yellow / Appearance: Clear / S.018 / pH: x Gluc: x / Ketone: Negative  / Bili: Negative / Urobili: Negative  Blood: x / Protein: 100 mg/dL / Nitrite: Negative  Leuk Esterase: Negative / RBC: 1 /hpf / WBC 2 /HPF  Sq Epi: x / Non Sq Epi: 1 /hpf / Bacteria: Negative  IMAGING: XR demonstrating R hip osteoarthritis CT and MRI of R hip shows osteoarthritis of the R hip  < from: Transthoracic Echocardiogram (12.10.19 @ 10:30) > 1. Mitral annular calcification, otherwise normal mitral valve. Mild mitral regurgitation. 2. Calcified trileaflet aortic valve with decreased opening. Peak transaortic valve gradient equals 18 mm Hg, mean transaortic valve gradient equals 12 mm Hg, estimated aortic valve area equals 1.4 sqcm (by continuity equation), aortic valve velocity time integral equals 46 cm, consistent with moderate aortic stenosis. No aortic valve regurgitation seen. 3. Normal left ventricular systolic function. No segmental wall motion abnormalities. 4. Moderate diastolic dysfunction (Stage II). 5. Normal right ventricular size and function. *** Compared with echocardiogram of 12/10/2018, there has been interval progressionof the aortic valve disease.   ASSESSMENT: (1)Renal - CKD3a - fluctuating numbers based on hemodynamic status (2)Lytes - highly acceptable for now (3)Ortho - right hip fracture - planned for OR today  RECOMMEND: (1)No renal objection to proceeding with OR (2)Meds for GFR 40-50ml/min for now  Thank you for involving [facility] in this patient's care.  With warm regards,  Ignacio Morataya MD  [facility] Office: [phone] Cell: [phone]

## 2020-11-04 NOTE — PROGRESS NOTE ADULT - ASSESSMENT
Patient is 80 year old female with Severe right hip degenerative changes and pain s/p fall  ·	Pain control  ·	plan for OR with Dr. Guerrero for total hip replacement tomorrow if medically cleared/optimized  ·	Follow up medical and cardiology assessment and optimization  ·	NPO after midnight  ·	wbat  ·	will discuss with attending and change plan as needed

## 2020-11-04 NOTE — CONSULT NOTE ADULT - ASSESSMENT
Degenerative hip disease  planned for MARLA    CAD   abn stress test   recommend low dose asa     Mod AS  stable     CKD   fu with renal     abn EKG  q waves in anterior leads consistent with old MI     Pre-Operative Cardiac Risk Stratification and Optimization  Based on current ACC/AHA guidelines, patient history and physical exam, the patient is considered to have elevated risk   given her abn stress test, , sob and abn EKG , pt needs cath prior to surgery   recommend renal eval for optimization   she has contrast allergy getting hives , will given prednisone   plan for cath tomorrow

## 2020-11-05 DIAGNOSIS — N28.9 DISORDER OF KIDNEY AND URETER, UNSPECIFIED: ICD-10-CM

## 2020-11-05 DIAGNOSIS — I25.10 ATHEROSCLEROTIC HEART DISEASE OF NATIVE CORONARY ARTERY WITHOUT ANGINA PECTORIS: ICD-10-CM

## 2020-11-05 LAB
ALBUMIN SERPL ELPH-MCNC: 3.9 G/DL — SIGNIFICANT CHANGE UP (ref 3.3–5)
ALP SERPL-CCNC: 71 U/L — SIGNIFICANT CHANGE UP (ref 40–120)
ALT FLD-CCNC: 9 U/L — LOW (ref 10–45)
ANION GAP SERPL CALC-SCNC: 11 MMOL/L — SIGNIFICANT CHANGE UP (ref 5–17)
ANION GAP SERPL CALC-SCNC: 12 MMOL/L — SIGNIFICANT CHANGE UP (ref 5–17)
ANION GAP SERPL CALC-SCNC: 12 MMOL/L — SIGNIFICANT CHANGE UP (ref 5–17)
APTT BLD: 26.4 SEC — LOW (ref 27.5–35.5)
APTT BLD: 27 SEC — LOW (ref 27.5–35.5)
AST SERPL-CCNC: 11 U/L — SIGNIFICANT CHANGE UP (ref 10–40)
BILIRUB DIRECT SERPL-MCNC: <0.1 MG/DL — SIGNIFICANT CHANGE UP (ref 0–0.2)
BILIRUB INDIRECT FLD-MCNC: >0.2 MG/DL — SIGNIFICANT CHANGE UP (ref 0.2–1)
BILIRUB SERPL-MCNC: 0.3 MG/DL — SIGNIFICANT CHANGE UP (ref 0.2–1.2)
BLD GP AB SCN SERPL QL: NEGATIVE — SIGNIFICANT CHANGE UP
BUN SERPL-MCNC: 42 MG/DL — HIGH (ref 7–23)
BUN SERPL-MCNC: 43 MG/DL — HIGH (ref 7–23)
BUN SERPL-MCNC: 44 MG/DL — HIGH (ref 7–23)
CALCIUM SERPL-MCNC: 9.3 MG/DL — SIGNIFICANT CHANGE UP (ref 8.4–10.5)
CALCIUM SERPL-MCNC: 9.4 MG/DL — SIGNIFICANT CHANGE UP (ref 8.4–10.5)
CALCIUM SERPL-MCNC: 9.7 MG/DL — SIGNIFICANT CHANGE UP (ref 8.4–10.5)
CHLORIDE SERPL-SCNC: 97 MMOL/L — SIGNIFICANT CHANGE UP (ref 96–108)
CHLORIDE SERPL-SCNC: 99 MMOL/L — SIGNIFICANT CHANGE UP (ref 96–108)
CHLORIDE SERPL-SCNC: 99 MMOL/L — SIGNIFICANT CHANGE UP (ref 96–108)
CO2 SERPL-SCNC: 27 MMOL/L — SIGNIFICANT CHANGE UP (ref 22–31)
CO2 SERPL-SCNC: 27 MMOL/L — SIGNIFICANT CHANGE UP (ref 22–31)
CO2 SERPL-SCNC: 28 MMOL/L — SIGNIFICANT CHANGE UP (ref 22–31)
CREAT SERPL-MCNC: 1.87 MG/DL — HIGH (ref 0.5–1.3)
CREAT SERPL-MCNC: 1.89 MG/DL — HIGH (ref 0.5–1.3)
CREAT SERPL-MCNC: 2.04 MG/DL — HIGH (ref 0.5–1.3)
GLUCOSE BLDC GLUCOMTR-MCNC: 212 MG/DL — HIGH (ref 70–99)
GLUCOSE BLDC GLUCOMTR-MCNC: 230 MG/DL — HIGH (ref 70–99)
GLUCOSE BLDC GLUCOMTR-MCNC: 243 MG/DL — HIGH (ref 70–99)
GLUCOSE BLDC GLUCOMTR-MCNC: 269 MG/DL — HIGH (ref 70–99)
GLUCOSE SERPL-MCNC: 214 MG/DL — HIGH (ref 70–99)
GLUCOSE SERPL-MCNC: 249 MG/DL — HIGH (ref 70–99)
GLUCOSE SERPL-MCNC: 254 MG/DL — HIGH (ref 70–99)
HCT VFR BLD CALC: 39.6 % — SIGNIFICANT CHANGE UP (ref 34.5–45)
HGB BLD-MCNC: 12.3 G/DL — SIGNIFICANT CHANGE UP (ref 11.5–15.5)
INR BLD: 0.94 RATIO — SIGNIFICANT CHANGE UP (ref 0.88–1.16)
INR BLD: 0.97 RATIO — SIGNIFICANT CHANGE UP (ref 0.88–1.16)
MCHC RBC-ENTMCNC: 29.5 PG — SIGNIFICANT CHANGE UP (ref 27–34)
MCHC RBC-ENTMCNC: 31.1 GM/DL — LOW (ref 32–36)
MCV RBC AUTO: 95 FL — SIGNIFICANT CHANGE UP (ref 80–100)
NRBC # BLD: 0 /100 WBCS — SIGNIFICANT CHANGE UP (ref 0–0)
PLATELET # BLD AUTO: 285 K/UL — SIGNIFICANT CHANGE UP (ref 150–400)
POTASSIUM SERPL-MCNC: 4.2 MMOL/L — SIGNIFICANT CHANGE UP (ref 3.5–5.3)
POTASSIUM SERPL-MCNC: 4.3 MMOL/L — SIGNIFICANT CHANGE UP (ref 3.5–5.3)
POTASSIUM SERPL-MCNC: 4.5 MMOL/L — SIGNIFICANT CHANGE UP (ref 3.5–5.3)
POTASSIUM SERPL-SCNC: 4.2 MMOL/L — SIGNIFICANT CHANGE UP (ref 3.5–5.3)
POTASSIUM SERPL-SCNC: 4.3 MMOL/L — SIGNIFICANT CHANGE UP (ref 3.5–5.3)
POTASSIUM SERPL-SCNC: 4.5 MMOL/L — SIGNIFICANT CHANGE UP (ref 3.5–5.3)
PROT SERPL-MCNC: 6.9 G/DL — SIGNIFICANT CHANGE UP (ref 6–8.3)
PROTHROM AB SERPL-ACNC: 11.3 SEC — SIGNIFICANT CHANGE UP (ref 10.6–13.6)
PROTHROM AB SERPL-ACNC: 11.6 SEC — SIGNIFICANT CHANGE UP (ref 10.6–13.6)
RBC # BLD: 4.17 M/UL — SIGNIFICANT CHANGE UP (ref 3.8–5.2)
RBC # FLD: 15 % — HIGH (ref 10.3–14.5)
RH IG SCN BLD-IMP: POSITIVE — SIGNIFICANT CHANGE UP
SODIUM SERPL-SCNC: 135 MMOL/L — SIGNIFICANT CHANGE UP (ref 135–145)
SODIUM SERPL-SCNC: 138 MMOL/L — SIGNIFICANT CHANGE UP (ref 135–145)
SODIUM SERPL-SCNC: 139 MMOL/L — SIGNIFICANT CHANGE UP (ref 135–145)
WBC # BLD: 5.37 K/UL — SIGNIFICANT CHANGE UP (ref 3.8–10.5)
WBC # FLD AUTO: 5.37 K/UL — SIGNIFICANT CHANGE UP (ref 3.8–10.5)

## 2020-11-05 PROCEDURE — 93306 TTE W/DOPPLER COMPLETE: CPT | Mod: 26

## 2020-11-05 PROCEDURE — 99152 MOD SED SAME PHYS/QHP 5/>YRS: CPT

## 2020-11-05 PROCEDURE — 99221 1ST HOSP IP/OBS SF/LOW 40: CPT

## 2020-11-05 PROCEDURE — 99232 SBSQ HOSP IP/OBS MODERATE 35: CPT | Mod: GC

## 2020-11-05 PROCEDURE — 93454 CORONARY ARTERY ANGIO S&I: CPT | Mod: 26

## 2020-11-05 RX ORDER — SODIUM CHLORIDE 9 MG/ML
1000 INJECTION INTRAMUSCULAR; INTRAVENOUS; SUBCUTANEOUS
Refills: 0 | Status: DISCONTINUED | OUTPATIENT
Start: 2020-11-05 | End: 2020-11-06

## 2020-11-05 RX ORDER — DIPHENHYDRAMINE HCL 50 MG
50 CAPSULE ORAL ONCE
Refills: 0 | Status: COMPLETED | OUTPATIENT
Start: 2020-11-05 | End: 2020-11-05

## 2020-11-05 RX ORDER — HYDROCORTISONE 20 MG
200 TABLET ORAL ONCE
Refills: 0 | Status: COMPLETED | OUTPATIENT
Start: 2020-11-05 | End: 2020-11-05

## 2020-11-05 RX ADMIN — Medication 25 MILLIGRAM(S): at 04:46

## 2020-11-05 RX ADMIN — SODIUM CHLORIDE 125 MILLILITER(S): 9 INJECTION INTRAMUSCULAR; INTRAVENOUS; SUBCUTANEOUS at 08:53

## 2020-11-05 RX ADMIN — Medication 2: at 12:28

## 2020-11-05 RX ADMIN — Medication 50 MILLIGRAM(S): at 22:25

## 2020-11-05 RX ADMIN — OXYCODONE HYDROCHLORIDE 5 MILLIGRAM(S): 5 TABLET ORAL at 11:28

## 2020-11-05 RX ADMIN — Medication 50 MILLIGRAM(S): at 15:05

## 2020-11-05 RX ADMIN — Medication 50 MILLIGRAM(S): at 13:51

## 2020-11-05 RX ADMIN — Medication 975 MILLIGRAM(S): at 05:20

## 2020-11-05 RX ADMIN — GABAPENTIN 600 MILLIGRAM(S): 400 CAPSULE ORAL at 04:46

## 2020-11-05 RX ADMIN — Medication 975 MILLIGRAM(S): at 04:47

## 2020-11-05 RX ADMIN — ATORVASTATIN CALCIUM 10 MILLIGRAM(S): 80 TABLET, FILM COATED ORAL at 22:25

## 2020-11-05 RX ADMIN — Medication 100 MILLIGRAM(S): at 20:20

## 2020-11-05 RX ADMIN — SPIRONOLACTONE 25 MILLIGRAM(S): 25 TABLET, FILM COATED ORAL at 04:46

## 2020-11-05 RX ADMIN — GABAPENTIN 600 MILLIGRAM(S): 400 CAPSULE ORAL at 20:21

## 2020-11-05 RX ADMIN — Medication 2: at 18:20

## 2020-11-05 RX ADMIN — ISOSORBIDE MONONITRATE 60 MILLIGRAM(S): 60 TABLET, EXTENDED RELEASE ORAL at 11:57

## 2020-11-05 RX ADMIN — Medication 975 MILLIGRAM(S): at 22:25

## 2020-11-05 RX ADMIN — Medication 200 MILLIGRAM(S): at 15:10

## 2020-11-05 RX ADMIN — Medication 50 MILLIGRAM(S): at 04:46

## 2020-11-05 RX ADMIN — BUMETANIDE 1 MILLIGRAM(S): 0.25 INJECTION INTRAMUSCULAR; INTRAVENOUS at 04:45

## 2020-11-05 RX ADMIN — Medication 2: at 06:38

## 2020-11-05 RX ADMIN — Medication 975 MILLIGRAM(S): at 13:51

## 2020-11-05 RX ADMIN — OXYCODONE HYDROCHLORIDE 5 MILLIGRAM(S): 5 TABLET ORAL at 10:58

## 2020-11-05 RX ADMIN — Medication 100 MILLIGRAM(S): at 04:45

## 2020-11-05 RX ADMIN — SENNA PLUS 2 TABLET(S): 8.6 TABLET ORAL at 22:25

## 2020-11-05 RX ADMIN — Medication 3: at 00:09

## 2020-11-05 NOTE — CONSULT NOTE ADULT - SUBJECTIVE AND OBJECTIVE BOX
History of Present Illness:  79 y/o Guinean speaking F w/ PMHx of HTN, HLD, DM, rectal cancer (s/p chemo/radiation and colectomy '15), breast CA (s/p chemo & R mastectomy 1989) presented to [facility] ED c/o acute  with R hip pain s/p mechanical fall 3 weeks ago. Pt interviewed with Guinean . Discussed case with daughter Shauna as well. Pt has been walking since her fall 3 weeks ago with consistent pain. Pt is able to bear weight and walks with assistive devices. Pt presented today to ED for shortness of breath and is being admitted for medical workup. Of note, patient has R TKA done several years ago but cannot remember name of surgeon. Pt is a community ambulator at baseline. Pt denies numbness, tingling, or paresthesias in affected limb. Pt denies headstrike or LOC and denies any other orthopaedic injuries at this time. Pt takes Eliquis 5mg BID for previous DVT but is unsure when her last dose was. Denies fevers, dizziness, CP, SOB, N/V, calf pain.      Past Medical History  Renal insufficiency    DVT of leg (deep venous thrombosis) - right calf DVT  2/2019 pt on Eliquis    CHF (congestive heart failure)    Obese    S/P radiation &gt; 12 weeks - 2/2015-3/2015    S/P chemotherapy, time since greater than 12 weeks - 2/2015-3/2015    Rectal cancer - s/p chemotherapy and  radiation 12 weeks 2/2015 -3/2015    Hyperlipidemia    Lymphedema of arm -right arm s/p mastectomy    HTN (hypertension)    Breast CA, right - 1989 s/p mastectomy ,IV Chemotherapy    Hyperlipidemia    DM (diabetes mellitus) - Type II    Past Surgical History  S/P TKR (total knee replacement), right - 2017    S/P colon resection - 2015    S/P ileostomy - low anterior resection-8/10/15, reversal of ileostomy 2016    History of appendectomy - 1953    S/P mastectomy, right - 1989    S/P colectomy - 2015    History of tonsillectomy      MEDICATIONS  (STANDING):  acetaminophen Tablet 975 milliGRAM(s) Oral every 8 hours  atorvastatin 10 milliGRAM(s) Oral at bedtime  dextrose 5%. 1000 milliLiter(s) (50 mL/Hr) IV Continuous <Continuous>  dextrose 50% Injectable 12.5 Gram(s) IV Push once  dextrose 50% Injectable 25 Gram(s) IV Push once  dextrose 50% Injectable 25 Gram(s) IV Push once  gabapentin 600 milliGRAM(s) Oral two times a day  hydrALAZINE 100 milliGRAM(s) Oral two times a day  influenza Vaccine 0.5 milliLiter(s) IntraMuscular once  insulin lispro (ADMELOG) corrective regimen sliding scale   SubCutaneous every 6 hours  isosorbide mononitrate ER Tablet (IMDUR) 60 milliGRAM(s) Oral daily  metoprolol succinate ER 25 milliGRAM(s) Oral daily  predniSONE Tablet 50 milliGRAM(s) Oral once  senna 2 Tablet(s) Oral at bedtime  sodium chloride 0.9%. 1000 milliLiter(s) (125 mL/Hr) IV Continuous <Continuous>    MEDICATIONS  (PRN):  dextrose 40% Gel 15 Gram(s) Oral once PRN Blood Glucose LESS THAN 70 milliGRAM(s)/deciliter  glucagon  Injectable 1 milliGRAM(s) IntraMuscular once PRN Glucose LESS THAN 70 milligrams/deciliter  magnesium hydroxide Suspension 30 milliLiter(s) Oral daily PRN Constipation  melatonin 3 milliGRAM(s) Oral at bedtime PRN Insomnia  oxyCODONE IR 2.5 milliGRAM(s) Oral every 4 hours PRN Moderate Pain (4 - 6)  oxyCODONE IR 5 milliGRAM(s) Oral every 4 hours PRN Severe Pain (7 - 10)  oxymetazoline 0.05% Nasal Spray 1 Spray(s) Both Nostrils once PRN Nasal Congestion  traMADol 50 milliGRAM(s) Oral every 6 hours PRN Mild Pain (1 - 3)      Allergies: CT scan dye (Hives), penicillin (Angioedema)      SOCIAL HISTORY:  Smoker: Denies  ETOH use: Denies  Illicit Drug use: Denies  Occupation: retired, 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   Lives with: alone - daughter lives 1 block away, son lives 30 minutes away   Assist device use: rolling walker     Relevant Family History  FAMILY HISTORY: Family history of heart attack (Child)    Family history of diabetes mellitus in son    Family history of diabetes mellitus in mother      Review of Systems  GENERAL:  Fevers[] chills[] sweats[] fatigue[] weight loss[] weight gain []                                        NEURO:  paresthesias[] seizures []  syncope []  confusion []                                                                                  EYES: glasses[]  blurry vision[]  discharge[] pain[] glaucoma []                                                                            ENMT:  difficulty hearing []  vertigo[]  dysphagia[] epistaxis[] recent dental work []                                      CV:  chest pain[] palpitations[] BHAT [] diaphoresis [] edema[]                                                                                             RESPIRATORY:  wheezing[] SOB[] cough [] sputum[] hemoptysis[]                                                                    GI:  nausea[]  vomiting []  diarrhea[] constipation [] melena []                                                                        : hematuria[ ]  dysuria[ ] urgency[] incontinence[]                                                                                              MUSCULOSKELETAL:  arthritis[ ]  joint swelling [ ] muscle weakness [ ]                                                                  SKIN/BREAST:  rash[ ] itching [ ]  hair loss[ ] masses[ ]                                                                                                PSYCH:  dementia [ ] depression [ ] anxiety[ ]                                                                                                                  HEME/LYMPH:  bruises easily[ ] enlarged lymph nodes[ ] tender lymph nodes[ ]                                                 ENDOCRINE:  cold intolerance[ ] heat intolerance[ ] polydipsia[ ]                                                                              PHYSICAL EXAM  Vital Signs Last 24 Hrs  T(C): 36.6 (05 Nov 2020 15:16), Max: 37.1 (04 Nov 2020 20:55)  T(F): 97.8 (05 Nov 2020 15:16), Max: 98.7 (04 Nov 2020 20:55)  HR: 68 (05 Nov 2020 18:30) (61 - 76)  BP: 147/73 (05 Nov 2020 18:30) (118/59 - 186/74)  BP(mean): --  RR: 16 (05 Nov 2020 18:30) (16 - 18)  SpO2: 95% (05 Nov 2020 18:30) (92% - 98%)    General: Well nourished, well developed, no acute distress.                                                         Neuro: Normal exam oriented to person/place & time with no focal motor or sensory  deficits.                    Eyes: Normal exam of conjunctiva & lids, pupils equally reactive.   ENT: Normal exam of nasal/oral mucosa with absence of cyanosis.   Neck: Normal exam of jugular veins, trachea & thyroid.   Chest: Normal lung exam with good air movement absence of wheezes, rales, or rhonchi:                                                                          CV:  Auscultation: normal [ ] S3[ ] S4[ ] Irregular [ ] Rub[ ] Clicks[ ]  Murmurs none:[ ]systolic [ ]  diastolic [ ] holosystolic [ ]  Carotids: No Bruits[ ] Other____________ Abdominal Aorta: normal [ ] nonpalpable[ ]                                                                         GI: Normal exam of abdomen, liver & spleen with no noted masses or tenderness.                                                                                              Extremities: Normal no evidence of cyanosis or deformity Edema: none[ ]trace[ ]1+[ ]2+[ ]3+[ ]4+[ ]  Lower Extremity Pulses: Right[ ] Left[ ]Varicosities[ ]  SKIN : Normal exam to inspection & palation.                                                           LABS:                        12.3   5.37  )-----------( 285      ( 05 Nov 2020 04:31 )             39.6     11-05    138  |  99  |  43<H>  ----------------------------<  214<H>  4.3   |  27  |  1.89<H>    Ca    9.3      05 Nov 2020 16:51    TPro  6.9  /  Alb  3.9  /  TBili  0.3  /  DBili  <0.1  /  AST  11  /  ALT  9<L>  /  AlkPhos  71  11-05    PT/INR - ( 05 Nov 2020 16:51 )   PT: 11.3 sec;   INR: 0.94 ratio         PTT - ( 05 Nov 2020 16:51 )  PTT:26.4 sec    Cardiac Cath:    TTE: pending              Italian : Aliya - #[id number]    History of Present Illness:  79 y/o Romansh speaking F w/ PMHx of DVT on Eliquis, HTN, HLD, DM, renal insufficiency, rectal cancer (s/p chemo/radiation and colectomy '15), breast CA (s/p chemo & R mastectomy 1989) presented to [facility] ED c/o acute SOB x 1 hour on 11/3. Additionally, pt reported R hip and R wrist pain s/p mechanical fall at home 3 weeks ago. Pt was admitted for further medical workup and was found to have significant degenerative disease of the R hip and was planned for MARLA. On pre-op cardiac workup for MARLA, pt was found to have an abnormal stress test and was referred for cardiac cath. Cath performed 11/5 showed 60% stenosis of the prox LAD and 90% stenosis of the ostial circumflex and ostial RCA.  CT surgery was consulted for CABG evaluation w/ Dr. Heath.     Past Medical History  Renal insufficiency    DVT of leg (deep venous thrombosis) - right calf DVT  2/2019 pt on Eliquis    CHF (congestive heart failure)    Obese    S/P radiation &gt; 12 weeks - 2/2015-3/2015    S/P chemotherapy, time since greater than 12 weeks - 2/2015-3/2015    Rectal cancer - s/p chemotherapy and  radiation 12 weeks 2/2015 -3/2015    Hyperlipidemia    Lymphedema of arm -right arm s/p mastectomy    HTN (hypertension)    Breast CA, right - 1989 s/p mastectomy ,IV Chemotherapy    Hyperlipidemia    DM (diabetes mellitus) - Type II    Past Surgical History  S/P TKR (total knee replacement), right - 2017    S/P colon resection - 2015    S/P ileostomy - low anterior resection-8/10/15, reversal of ileostomy 2016    History of appendectomy - 1953    S/P mastectomy, right - 1989    S/P colectomy - 2015    History of tonsillectomy      MEDICATIONS  (STANDING):  acetaminophen Tablet 975 milliGRAM(s) Oral every 8 hours  atorvastatin 10 milliGRAM(s) Oral at bedtime  dextrose 5%. 1000 milliLiter(s) (50 mL/Hr) IV Continuous <Continuous>  dextrose 50% Injectable 12.5 Gram(s) IV Push once  dextrose 50% Injectable 25 Gram(s) IV Push once  dextrose 50% Injectable 25 Gram(s) IV Push once  gabapentin 600 milliGRAM(s) Oral two times a day  hydrALAZINE 100 milliGRAM(s) Oral two times a day  influenza Vaccine 0.5 milliLiter(s) IntraMuscular once  insulin lispro (ADMELOG) corrective regimen sliding scale   SubCutaneous every 6 hours  isosorbide mononitrate ER Tablet (IMDUR) 60 milliGRAM(s) Oral daily  metoprolol succinate ER 25 milliGRAM(s) Oral daily  predniSONE Tablet 50 milliGRAM(s) Oral once  senna 2 Tablet(s) Oral at bedtime  sodium chloride 0.9%. 1000 milliLiter(s) (125 mL/Hr) IV Continuous <Continuous>    MEDICATIONS  (PRN):  dextrose 40% Gel 15 Gram(s) Oral once PRN Blood Glucose LESS THAN 70 milliGRAM(s)/deciliter  glucagon  Injectable 1 milliGRAM(s) IntraMuscular once PRN Glucose LESS THAN 70 milligrams/deciliter  magnesium hydroxide Suspension 30 milliLiter(s) Oral daily PRN Constipation  melatonin 3 milliGRAM(s) Oral at bedtime PRN Insomnia  oxyCODONE IR 2.5 milliGRAM(s) Oral every 4 hours PRN Moderate Pain (4 - 6)  oxyCODONE IR 5 milliGRAM(s) Oral every 4 hours PRN Severe Pain (7 - 10)  oxymetazoline 0.05% Nasal Spray 1 Spray(s) Both Nostrils once PRN Nasal Congestion  traMADol 50 milliGRAM(s) Oral every 6 hours PRN Mild Pain (1 - 3)      Allergies: CT scan dye (Hives), penicillin (Angioedema)      SOCIAL HISTORY:  Smoker: Denies  ETOH use: Denies  Illicit Drug use: Denies  Occupation: retired, 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   Lives with: alone - daughter lives 1 block away, son lives 30 minutes away   Assist device use: rolling walker     Relevant Family History  FAMILY HISTORY: Family history of heart attack (Child)    Family history of diabetes mellitus in son    Family history of diabetes mellitus in mother      Review of Systems  GENERAL:  Fevers[] chills[] sweats[] fatigue[] weight loss[] weight gain []                                        NEURO:  paresthesias[] seizures []  syncope []  confusion []                                                                                  EYES: glasses[]  blurry vision[]  discharge[] pain[] glaucoma []                                                                            ENMT:  difficulty hearing []  vertigo[]  dysphagia[] epistaxis[] recent dental work []                                      CV:  chest pain[] palpitations[] BHAT [] diaphoresis [] edema[]                                                                                             RESPIRATORY:  wheezing[] SOB[x] cough [] sputum[] hemoptysis[]                                                                    GI:  nausea[]  vomiting []  diarrhea[] constipation [] melena []                                                                        : hematuria[ ]  dysuria[ ] urgency[] incontinence[]                                                                                              MUSCULOSKELETAL:  R hip pain [x] R wrist pain [x] arthritis[ ]  joint swelling [ ] muscle weakness [ ]                                                                  SKIN/BREAST:  rash[ ] itching [ ]  hair loss[ ] masses[ ]                                                                                                PSYCH:  dementia [ ] depression [ ] anxiety[ ]                                                                                                                  HEME/LYMPH:  bruises easily[ ] enlarged lymph nodes[ ] tender lymph nodes[ ]                                                 ENDOCRINE:  cold intolerance[ ] heat intolerance[ ] polydipsia[ ]                                                                              PHYSICAL EXAM  Vital Signs Last 24 Hrs  T(C): 36.6 (05 Nov 2020 15:16), Max: 37.1 (04 Nov 2020 20:55)  T(F): 97.8 (05 Nov 2020 15:16), Max: 98.7 (04 Nov 2020 20:55)  HR: 68 (05 Nov 2020 18:30) (61 - 76)  BP: 147/73 (05 Nov 2020 18:30) (118/59 - 186/74)  RR: 16 (05 Nov 2020 18:30) (16 - 18)  SpO2: 95% (05 Nov 2020 18:30) (92% - 98%)    General: Well nourished, well developed, no acute distress, obese.                                                         Neuro: Normal exam oriented to person/place & time with no focal motor or sensory  deficits.                    Eyes: Normal exam of conjunctiva & lids, pupils equally reactive.   ENT: Normal exam of nasal/oral mucosa with absence of cyanosis.   Neck: Normal exam of jugular veins, trachea & thyroid.   Chest: (+) s/p R breast mastectomy. Normal lung exam with good air movement absence of wheezes, rales, or rhonchi:                                                                          CV:  Auscultation: normal [x] S3[ ] S4[ ] Irregular [ ] Rub[ ] Clicks[ ]  Murmurs none:[x]systolic [ ]  diastolic [ ] holosystolic [ ]  Carotids: No Bruits[x] Abdominal Aorta: normal [x] nonpalpable[ ]                                                                         GI: Normal exam of abdomen, liver & spleen with no noted masses or tenderness.                                                                                              Extremities: L groin cath site w/ dressing in place - CDI, soft, no bleeding or hematoma noted. Normal no evidence of cyanosis or deformity Edema: none[ ]trace[x]1+[ ]2+[ ]3+[ ]4+[ ]  Lower Extremity Pulses: Right[x] Left[x] Varicosities: none   SKIN : Normal exam to inspection & palpation.                                                           LABS:                        12.3   5.37  )-----------( 285      ( 05 Nov 2020 04:31 )             39.6     11-05    138  |  99  |  43<H>  ----------------------------<  214<H>  4.3   |  27  |  1.89<H>    Ca    9.3      05 Nov 2020 16:51    TPro  6.9  /  Alb  3.9  /  TBili  0.3  /  DBili  <0.1  /  AST  11  /  ALT  9<L>  /  AlkPhos  71  11-05    PT/INR - ( 05 Nov 2020 16:51 )   PT: 11.3 sec;   INR: 0.94 ratio      PTT - ( 05 Nov 2020 16:51 )  PTT:26.4 sec    Cardiac Cath: (11.05.20 @ 15:13): The coronary circulation is right dominant. LM: Angiography showed moderate atherosclerosis. Proximal LAD: There was a diffuse 60 % stenosis. Ostial circumflex: There was a 90 % stenosis. Ostial RCA: There was a 90 % stenosis.    TTE: pending

## 2020-11-05 NOTE — CONSULT NOTE ADULT - ASSESSMENT
81 y/o Azeri speaking F w/ PMHx of DVT on Eliquis, HTN, HLD, DM, renal insufficiency, rectal cancer (s/p chemo/radiation and colectomy '15), breast CA (s/p chemo & R mastectomy 1989) presented to [facility] ED c/o acute SOB x 1 hour on 11/3, also c/o R hip and R wrist pain s/p mechanical fall at home 3 weeks ago. Pt was admitted and found to have significant degenerative disease of the R hip and was planned for MARLA. On pre-op cardiac workup for MARLA, pt was found to have an abnormal stress test and was referred for cardiac cath. Cath performed 11/5 showed 60% stenosis of the prox LAD and 90% stenosis of the ostial circumflex and ostial RCA.  CT surgery was consulted for CABG evaluation w/ Dr. Heath.     11/5: Pt seen and evaluated at bedside - VSS, NAD. Pt is amenable to CT surgery at this time. CT surgery pre-op workup in progress - will check pre-op labs and TTE. D/w Dr. Heath - pt scheduled for OR Monday 11/9. Recommend initiating ASA 81 mg QD pre-op and continuation of Statin and beta blocker. Continue care as per primary team.

## 2020-11-05 NOTE — PROGRESS NOTE ADULT - SUBJECTIVE AND OBJECTIVE BOX
Subjective: Patient seen and examined. No new events except as noted.     SUBJECTIVE/ROS:  feels ok       MEDICATIONS:  MEDICATIONS  (STANDING):  acetaminophen   Tablet .. 975 milliGRAM(s) Oral every 8 hours  atorvastatin 10 milliGRAM(s) Oral at bedtime  buMETAnide 1 milliGRAM(s) Oral two times a day  dextrose 5%. 1000 milliLiter(s) (50 mL/Hr) IV Continuous <Continuous>  dextrose 50% Injectable 12.5 Gram(s) IV Push once  dextrose 50% Injectable 25 Gram(s) IV Push once  dextrose 50% Injectable 25 Gram(s) IV Push once  gabapentin 600 milliGRAM(s) Oral two times a day  hydrALAZINE 100 milliGRAM(s) Oral two times a day  influenza   Vaccine 0.5 milliLiter(s) IntraMuscular once  insulin lispro (ADMELOG) corrective regimen sliding scale   SubCutaneous every 6 hours  isosorbide   mononitrate ER Tablet (IMDUR) 60 milliGRAM(s) Oral daily  metoprolol succinate ER 25 milliGRAM(s) Oral daily  senna 2 Tablet(s) Oral at bedtime  spironolactone 25 milliGRAM(s) Oral daily      PHYSICAL EXAM:  T(C): 36.7 (11-05-20 @ 04:07), Max: 37.1 (11-04-20 @ 20:55)  HR: 75 (11-05-20 @ 04:07) (69 - 78)  BP: 170/85 (11-05-20 @ 04:07) (110/54 - 170/85)  RR: 18 (11-05-20 @ 04:07) (17 - 18)  SpO2: 94% (11-05-20 @ 04:07) (92% - 95%)  Wt(kg): --  I&O's Summary    04 Nov 2020 07:01  -  05 Nov 2020 07:00  --------------------------------------------------------  IN: 720 mL / OUT: 1550 mL / NET: -830 mL            JVP: Normal  Neck: supple  Lung: clear   CV: S1 S2 , Murmur:  Abd: soft  Ext: No edema  neuro: Awake / alert  Psych: flat affect  Skin: normal``    LABS/DATA:    CARDIAC MARKERS:                                12.3   5.37  )-----------( 285      ( 05 Nov 2020 04:31 )             39.6     11-05    135  |  97  |  44<H>  ----------------------------<  249<H>  4.5   |  27  |  2.04<H>    Ca    9.7      05 Nov 2020 04:31    TPro  x   /  Alb  3.6  /  TBili  x   /  DBili  x   /  AST  x   /  ALT  x   /  AlkPhos  x   11-04    proBNP:   Lipid Profile:   HgA1c:   TSH:     TELE:  EKG:

## 2020-11-05 NOTE — PROGRESS NOTE ADULT - SUBJECTIVE AND OBJECTIVE BOX
Pt seen/examined. No acute overnight complaints or events. She is NPO for cardiac cath today. She received steroids for contrast allergy.    T(C): 36.7 (11-05-20 @ 04:07), Max: 37.1 (11-04-20 @ 20:55)  HR: 75 (11-05-20 @ 04:07) (69 - 78)  BP: 170/85 (11-05-20 @ 04:07) (110/54 - 170/85)  RR: 18 (11-05-20 @ 04:07) (17 - 18)  SpO2: 94% (11-05-20 @ 04:07) (92% - 95%)  Wt(kg): --                          12.3   5.37  )-----------( 285      ( 05 Nov 2020 04:31 )             39.6                         10.9   6.56  )-----------( 249      ( 04 Nov 2020 06:37 )             35.6       11-05    135  |  97  |  44<H>  ----------------------------<  249<H>  4.5   |  27  |  2.04<H>        PT/INR - ( 05 Nov 2020 04:31 )   PT: 11.6 sec;   INR: 0.97 ratio         PTT - ( 05 Nov 2020 04:31 )  PTT:27.0 sec    Gen: awake, alert, NAD  Resp: no increased work of breathing  RLE:  skin intact  +EHL/FHL/TA/GS  SILT S/S/SP/DP  +DP/PT Pulses  Compartments soft  No calf TTP     A/P: 81yo Female with severe right hip osteoarthritis and pain s/p fall    - Pain control  - WBAT  - mechanical/DVT ppx  - OOB/PT  - NPO after midnight for cardiac cath today, received steroids for contrast allergy  - Appreciate cardology recs  - Appreciate nephrology recs  - Plan for OR with Dr. Guerrero next week

## 2020-11-05 NOTE — PROGRESS NOTE ADULT - ASSESSMENT
Degenerative hip disease  planned for MARLA    CAD   abn stress test   recommend low dose asa     Mod AS  stable     CKD   fu with renal     abn EKG  q waves in anterior leads consistent with old MI     Pre-Operative Cardiac Risk Stratification and Optimization  Based on current ACC/AHA guidelines, patient history and physical exam, the patient is considered to have elevated risk   given her abn stress test, , sob and abn EKG , pt needs cath prior to surgery   recommend renal eval for optimization   she has contrast allergy getting hives , s/p prednisone   plan for cath today

## 2020-11-05 NOTE — PROGRESS NOTE ADULT - SUBJECTIVE AND OBJECTIVE BOX
No pain, no sob   VITAL: T(C): , Max: 37.1 (11-04-20 @ 20:55) T(F): , Max: 98.7 (11-04-20 @ 20:55) HR: 75 (11-05-20 @ 04:07) BP: 170/85 (11-05-20 @ 04:07) RR: 18 (11-05-20 @ 04:07) SpO2: 94% (11-05-20 @ 04:07)   PHYSICAL EXAM: Constitutional: NAD, Alert HEENT: NCAT, MMM Neck: Supple, No JVD Respiratory: CTA-b/l Cardiovascular: RRR s1s2, no m/r/g Gastrointestinal: BS+, soft, NT/ND Extremities: No peripheral edema b/l Neurological: no focal deficits; strength grossly intact Back: no CVAT b/l Skin: No rashes, no nevi   LABS:                      12.3  5.37  )-----------( 285      ( 05 Nov 2020 04:31 )            39.6   Na(135)/K(4.5)/Cl(97)/HCO3(27)/BUN(44)/Cr(2.04)Glu(249)/Ca(9.7)/Mg(--)/PO4(--)    11-05 @ 04:31 Na(139)/K(3.9)/Cl(101)/HCO3(26)/BUN(29)/Cr(1.65)Glu(109)/Ca(9.3)/Mg(--)/PO4(--)    11-04 @ 06:37 Na(135)/K(4.8)/Cl(97)/HCO3(23)/BUN(23)/Cr(1.46)Glu(221)/Ca(9.6)/Mg(--)/PO4(--)    11-03 @ 05:56 Na(138)/K(4.8)/Cl(101)/HCO3(24)/BUN(22)/Cr(1.36)Glu(164)/Ca(9.7)/Mg(--)/PO4(--)    11-02 @ 22:17   IMPRESSION: 80F w/ HTN, DM2, CKD3a, and HFpEF, 11/2/20 a/w right hip fracture s/p mechanical fall  (1)CKD - stage 3a with non-nephrotic range proteinuria - likely due to HTN/DM (2)TAO - likely prerenally mediated (3)Ortho - right hip fracture - needing cath for optimization    RECOMMEND: (1)D/C diuretics (2)NS 100cc/h x 5hours now (3)If creatinine <2 after the IVF, I would be okay with moving forward with cath today  discussed with Cardiology Dr. Sergey Maxwell and Interventional Cardiology Dr. Ruddy Salvador - will look to touch base again this afternoon.      Ignacio Morataya MD [facility] Office: [phone] Cell: [phone]      No pain, no sob   VITAL: T(C): , Max: 37.1 (11-04-20 @ 20:55) T(F): , Max: 98.7 (11-04-20 @ 20:55) HR: 75 (11-05-20 @ 04:07) BP: 170/85 (11-05-20 @ 04:07) RR: 18 (11-05-20 @ 04:07) SpO2: 94% (11-05-20 @ 04:07)   PHYSICAL EXAM: Constitutional: NAD, Alert HEENT: NCAT, MMM Neck: Supple, No JVD Respiratory: CTA-b/l Cardiovascular: RRR s1s2, no m/r/g Gastrointestinal: BS+, soft, NT/ND Extremities: No peripheral edema b/l Neurological: no focal deficits; strength grossly intact Back: no CVAT b/l Skin: No rashes, no nevi   LABS:                      12.3  5.37  )-----------( 285      ( 05 Nov 2020 04:31 )            39.6   Na(135)/K(4.5)/Cl(97)/HCO3(27)/BUN(44)/Cr(2.04)Glu(249)/Ca(9.7)/Mg(--)/PO4(--)    11-05 @ 04:31 Na(139)/K(3.9)/Cl(101)/HCO3(26)/BUN(29)/Cr(1.65)Glu(109)/Ca(9.3)/Mg(--)/PO4(--)    11-04 @ 06:37 Na(135)/K(4.8)/Cl(97)/HCO3(23)/BUN(23)/Cr(1.46)Glu(221)/Ca(9.6)/Mg(--)/PO4(--)    11-03 @ 05:56 Na(138)/K(4.8)/Cl(101)/HCO3(24)/BUN(22)/Cr(1.36)Glu(164)/Ca(9.7)/Mg(--)/PO4(--)    11-02 @ 22:17   IMPRESSION: 80F w/ HTN, DM2, CKD3a, and HFpEF, 11/2/20 a/w right hip fracture s/p mechanical fall  (1)CKD - stage 3a with non-nephrotic range proteinuria - likely due to HTN/DM (2)TAO - likely prerenally mediated (3)Ortho - right hip fracture - needing cath for optimization    RECOMMEND: (1)D/C diuretics (2)NS 125cc/h x 4hours now (3)If creatinine <2 after the IVF, I would be okay with moving forward with cath today  discussed with Cardiology Dr. Sergey Maxwell and Interventional Cardiology Dr. Ruddy Salvador - will look to touch base again this afternoon.      Ignacio Morataya MD [facility] Office: [phone] Cell: [phone]

## 2020-11-06 LAB
A1C WITH ESTIMATED AVERAGE GLUCOSE RESULT: 7.5 % — HIGH (ref 4–5.6)
CHOLEST SERPL-MCNC: 152 MG/DL — SIGNIFICANT CHANGE UP
ESTIMATED AVERAGE GLUCOSE: 169 MG/DL — HIGH (ref 68–114)
FIBRINOGEN PPP-MCNC: 437 MG/DL — SIGNIFICANT CHANGE UP (ref 290–520)
GLUCOSE BLDC GLUCOMTR-MCNC: 240 MG/DL — HIGH (ref 70–99)
GLUCOSE BLDC GLUCOMTR-MCNC: 298 MG/DL — HIGH (ref 70–99)
GLUCOSE BLDC GLUCOMTR-MCNC: 336 MG/DL — HIGH (ref 70–99)
GLUCOSE BLDC GLUCOMTR-MCNC: 337 MG/DL — HIGH (ref 70–99)
GLUCOSE BLDC GLUCOMTR-MCNC: 354 MG/DL — HIGH (ref 70–99)
HDLC SERPL-MCNC: 49 MG/DL — LOW
LIPID PNL WITH DIRECT LDL SERPL: 78 MG/DL — SIGNIFICANT CHANGE UP
MRSA PCR RESULT.: DETECTED
NON HDL CHOLESTEROL: 103 MG/DL — SIGNIFICANT CHANGE UP
S AUREUS DNA NOSE QL NAA+PROBE: DETECTED
SARS-COV-2 IGG SERPL QL IA: NEGATIVE — SIGNIFICANT CHANGE UP
SARS-COV-2 IGM SERPL IA-ACNC: 0.1 INDEX — SIGNIFICANT CHANGE UP
T3 SERPL-MCNC: 56 NG/DL — LOW (ref 80–200)
T3 SERPL-MCNC: 57 NG/DL — LOW (ref 80–200)
T4 AB SER-ACNC: 6.8 UG/DL — SIGNIFICANT CHANGE UP (ref 4.6–12)
T4 AB SER-ACNC: 7.8 UG/DL — SIGNIFICANT CHANGE UP (ref 4.6–12)
TRIGL SERPL-MCNC: 123 MG/DL — SIGNIFICANT CHANGE UP
TSH SERPL-MCNC: 0.21 UIU/ML — LOW (ref 0.27–4.2)
TSH SERPL-MCNC: 0.22 UIU/ML — LOW (ref 0.27–4.2)

## 2020-11-06 PROCEDURE — 99231 SBSQ HOSP IP/OBS SF/LOW 25: CPT | Mod: GC

## 2020-11-06 RX ORDER — INSULIN LISPRO 100/ML
VIAL (ML) SUBCUTANEOUS AT BEDTIME
Refills: 0 | Status: DISCONTINUED | OUTPATIENT
Start: 2020-11-06 | End: 2020-11-09

## 2020-11-06 RX ORDER — GABAPENTIN 400 MG/1
300 CAPSULE ORAL
Refills: 0 | Status: DISCONTINUED | OUTPATIENT
Start: 2020-11-07 | End: 2020-11-09

## 2020-11-06 RX ORDER — MUPIROCIN 20 MG/G
1 OINTMENT TOPICAL
Refills: 0 | Status: DISCONTINUED | OUTPATIENT
Start: 2020-11-06 | End: 2020-11-09

## 2020-11-06 RX ORDER — INSULIN LISPRO 100/ML
3 VIAL (ML) SUBCUTANEOUS
Refills: 0 | Status: DISCONTINUED | OUTPATIENT
Start: 2020-11-06 | End: 2020-11-09

## 2020-11-06 RX ORDER — ASPIRIN/CALCIUM CARB/MAGNESIUM 324 MG
81 TABLET ORAL DAILY
Refills: 0 | Status: DISCONTINUED | OUTPATIENT
Start: 2020-11-06 | End: 2020-11-09

## 2020-11-06 RX ORDER — ENOXAPARIN SODIUM 100 MG/ML
30 INJECTION SUBCUTANEOUS DAILY
Refills: 0 | Status: DISCONTINUED | OUTPATIENT
Start: 2020-11-07 | End: 2020-11-08

## 2020-11-06 RX ORDER — INSULIN LISPRO 100/ML
VIAL (ML) SUBCUTANEOUS
Refills: 0 | Status: DISCONTINUED | OUTPATIENT
Start: 2020-11-06 | End: 2020-11-09

## 2020-11-06 RX ORDER — ENOXAPARIN SODIUM 100 MG/ML
40 INJECTION SUBCUTANEOUS DAILY
Refills: 0 | Status: DISCONTINUED | OUTPATIENT
Start: 2020-11-06 | End: 2020-11-06

## 2020-11-06 RX ORDER — INSULIN GLARGINE 100 [IU]/ML
7 INJECTION, SOLUTION SUBCUTANEOUS AT BEDTIME
Refills: 0 | Status: DISCONTINUED | OUTPATIENT
Start: 2020-11-06 | End: 2020-11-09

## 2020-11-06 RX ADMIN — SENNA PLUS 2 TABLET(S): 8.6 TABLET ORAL at 22:39

## 2020-11-06 RX ADMIN — Medication 100 MILLIGRAM(S): at 17:23

## 2020-11-06 RX ADMIN — Medication 975 MILLIGRAM(S): at 03:14

## 2020-11-06 RX ADMIN — Medication 25 MILLIGRAM(S): at 06:24

## 2020-11-06 RX ADMIN — Medication 100 MILLIGRAM(S): at 06:24

## 2020-11-06 RX ADMIN — MUPIROCIN 1 APPLICATION(S): 20 OINTMENT TOPICAL at 22:47

## 2020-11-06 RX ADMIN — ISOSORBIDE MONONITRATE 60 MILLIGRAM(S): 60 TABLET, EXTENDED RELEASE ORAL at 12:17

## 2020-11-06 RX ADMIN — Medication 975 MILLIGRAM(S): at 06:24

## 2020-11-06 RX ADMIN — Medication 50 MILLIGRAM(S): at 06:24

## 2020-11-06 RX ADMIN — INSULIN GLARGINE 7 UNIT(S): 100 INJECTION, SOLUTION SUBCUTANEOUS at 22:49

## 2020-11-06 RX ADMIN — GABAPENTIN 600 MILLIGRAM(S): 400 CAPSULE ORAL at 06:24

## 2020-11-06 RX ADMIN — Medication 975 MILLIGRAM(S): at 22:39

## 2020-11-06 RX ADMIN — Medication 3: at 01:12

## 2020-11-06 RX ADMIN — Medication 5: at 12:16

## 2020-11-06 RX ADMIN — Medication 81 MILLIGRAM(S): at 12:17

## 2020-11-06 RX ADMIN — Medication 4: at 17:20

## 2020-11-06 RX ADMIN — Medication 2: at 06:37

## 2020-11-06 RX ADMIN — Medication 1: at 22:50

## 2020-11-06 RX ADMIN — ATORVASTATIN CALCIUM 10 MILLIGRAM(S): 80 TABLET, FILM COATED ORAL at 22:39

## 2020-11-06 NOTE — PROGRESS NOTE ADULT - ASSESSMENT
Degenerative hip disease  planned for MARLA in future     CAD   abn stress test   cath shows three vessel CAD   plan for cabg     Mod AS  stable   not visualized on repeat echo     CKD   fu with renal     abn EKG  q waves in anterior leads consistent with old MI     Pre-Operative Cardiac Risk Stratification and Optimization  Based on current ACC/AHA guidelines, patient history and physical exam, the patient is considered to have high risk   given significant 3vcad , will postpone non cardiac surgery till safe after cabg

## 2020-11-06 NOTE — PROVIDER CONTACT NOTE (OTHER) - BACKGROUND
79yo F in s/p fall at home, requiring Hip sx and cardiac workup showing triple vessel disease, requiring CABG.

## 2020-11-06 NOTE — PROGRESS NOTE ADULT - SUBJECTIVE AND OBJECTIVE BOX
Subjective: Patient seen and examined. No new events except as noted.     SUBJECTIVE/ROS:  feels ok       MEDICATIONS:  MEDICATIONS  (STANDING):  acetaminophen   Tablet .. 975 milliGRAM(s) Oral every 8 hours  aspirin enteric coated 81 milliGRAM(s) Oral daily  atorvastatin 10 milliGRAM(s) Oral at bedtime  dextrose 5%. 1000 milliLiter(s) (50 mL/Hr) IV Continuous <Continuous>  dextrose 50% Injectable 12.5 Gram(s) IV Push once  dextrose 50% Injectable 25 Gram(s) IV Push once  dextrose 50% Injectable 25 Gram(s) IV Push once  gabapentin 600 milliGRAM(s) Oral two times a day  hydrALAZINE 100 milliGRAM(s) Oral two times a day  influenza   Vaccine 0.5 milliLiter(s) IntraMuscular once  insulin lispro (ADMELOG) corrective regimen sliding scale   SubCutaneous every 6 hours  isosorbide   mononitrate ER Tablet (IMDUR) 60 milliGRAM(s) Oral daily  metoprolol succinate ER 25 milliGRAM(s) Oral daily  senna 2 Tablet(s) Oral at bedtime  sodium chloride 0.9%. 1000 milliLiter(s) (125 mL/Hr) IV Continuous <Continuous>      PHYSICAL EXAM:  T(C): 36.9 (11-06-20 @ 04:25), Max: 36.9 (11-06-20 @ 04:25)  HR: 66 (11-06-20 @ 04:25) (61 - 76)  BP: 136/63 (11-06-20 @ 04:25) (133/75 - 186/74)  RR: 18 (11-06-20 @ 04:25) (16 - 20)  SpO2: 94% (11-06-20 @ 04:25) (94% - 98%)  Wt(kg): --  I&O's Summary    04 Nov 2020 07:01  -  05 Nov 2020 07:00  --------------------------------------------------------  IN: 720 mL / OUT: 1550 mL / NET: -830 mL    05 Nov 2020 07:01  -  06 Nov 2020 06:51  --------------------------------------------------------  IN: 740 mL / OUT: 850 mL / NET: -110 mL            JVP: Normal  Neck: supple  Lung: clear   CV: S1 S2 , Murmur:  Abd: soft  Ext: No edema  neuro: Awake / alert  Psych: flat affect  Skin: normal``    LABS/DATA:    CARDIAC MARKERS:                                12.3   5.37  )-----------( 285      ( 05 Nov 2020 04:31 )             39.6     11-05    138  |  99  |  43<H>  ----------------------------<  214<H>  4.3   |  27  |  1.89<H>    Ca    9.3      05 Nov 2020 16:51    TPro  6.9  /  Alb  3.9  /  TBili  0.3  /  DBili  <0.1  /  AST  11  /  ALT  9<L>  /  AlkPhos  71  11-05    proBNP:   Lipid Profile:   HgA1c:   TSH:     TELE:  EKG:

## 2020-11-06 NOTE — PHARMACOTHERAPY INTERVENTION NOTE - COMMENTS
Patient is on gabapentin 600 mg BID from home (per daughter and pharmacy, recently increased from 600 mg daily). Recommended reducing dose to 300 mg BID given calculated CrCl of 23 mL/min. Also recommended changing insulin sliding scale from Q6H to TID and HS now that the patient is on a regular diet. Patient is on Eliquis 2.5 mg BID from home for history of DVT, currently not on anticoagulation - recommended assessing need for anticoagulation while inpatient.    Sally Dejesus, PharmD, BCPS  [phone]

## 2020-11-06 NOTE — PROGRESS NOTE ADULT - SUBJECTIVE AND OBJECTIVE BOX
Pt seen/examined. No acute overnight complaints or events. Pt had cardiac cath performed yesterday and evaluated by CT surgery for coronary artery stenosis, plan for OR monday 11/9 for CABG.    Vital Signs Last 24 Hrs  T(C): 36.9 (06 Nov 2020 04:25), Max: 36.9 (06 Nov 2020 04:25)  T(F): 98.4 (06 Nov 2020 04:25), Max: 98.4 (06 Nov 2020 04:25)  HR: 66 (06 Nov 2020 04:25) (61 - 76)  BP: 136/63 (06 Nov 2020 04:25) (133/75 - 186/74)  BP(mean): --  RR: 18 (06 Nov 2020 04:25) (16 - 20)  SpO2: 94% (06 Nov 2020 04:25) (94% - 98%)    Gen: awake, alert, NAD  Resp: no increased work of breathing  RLE:  skin intact  +EHL/FHL/TA/GS  SILT S/S/SP/DP  +DP/PT Pulses  Compartments soft  No calf TTP     A/P: 81yo Female with severe right hip osteoarthritis and pain s/p fall    - OR Monday 11/9 with CT surgery for CABG  - Plan for transfer to CT surgery service for further management; evaluation for MARLA to be done at a later date  - Pain control  - WBAT  - mechanical/DVT ppx  - OOB/PT  - Appreciate cardology recs  - Appreciate nephrology recs  - Will discuss with Dr. Guerrero and advise as plan changes

## 2020-11-06 NOTE — PROGRESS NOTE ADULT - SUBJECTIVE AND OBJECTIVE BOX
Overnight events noted   VITAL: T(C): , Max: 36.9 (11-06-20 @ 04:25) T(F): , Max: 98.4 (11-06-20 @ 04:25) HR: 66 (11-06-20 @ 04:25) BP: 136/63 (11-06-20 @ 04:25) RR: 18 (11-06-20 @ 04:25) SpO2: 94% (11-06-20 @ 04:25)   PHYSICAL EXAM: Constitutional: NAD, Alert HEENT: NCAT, MMM Neck: Supple, No JVD Respiratory: CTA-b/l Cardiovascular: RRR s1s2, no m/r/g Gastrointestinal: BS+, soft, NT/ND Extremities: No peripheral edema b/l Neurological: no focal deficits; strength grossly intact Back: no CVAT b/l Skin: No rashes, no nevi   LABS:                      12.3  5.37  )-----------( 285      ( 05 Nov 2020 04:31 )            39.6   Na(138)/K(4.3)/Cl(99)/HCO3(27)/BUN(43)/Cr(1.89)Glu(214)/Ca(9.3)/Mg(--)/PO4(--)    11-05 @ 16:51 Na(139)/K(4.2)/Cl(99)/HCO3(28)/BUN(42)/Cr(1.87)Glu(254)/Ca(9.4)/Mg(--)/PO4(--)    11-05 @ 13:16 Na(135)/K(4.5)/Cl(97)/HCO3(27)/BUN(44)/Cr(2.04)Glu(249)/Ca(9.7)/Mg(--)/PO4(--)    11-05 @ 04:31 Na(139)/K(3.9)/Cl(101)/HCO3(26)/BUN(29)/Cr(1.65)Glu(109)/Ca(9.3)/Mg(--)/PO4(--)    11-04 @ 06:37  CATH11/5/2020:  s/p dx cath via LFA, mLM 20%, LAD moderate, ostial Cx 80%, ostial RCA 90%- CTS  consult pending    IMPRESSION: 80F w/ HTN, DM2, CKD3a, and HFpEF, 11/2/20 a/w right hip fracture s/p mechanical fall  (1)Renal - stage 3b with non-nephrotic range proteinuria; mild superimposed prerenal azotemia of late. No evidence of contrast nephropathy s/p cath yesterday  (2)Cardiac - s/p cath yesterday - severe multivessel disease - now scheduled for CABG 11/9 with Dr. William Heath  (3)Ortho - right hip fracture - Ortho input appreciated - will have to wait at least 4 weeks post CABG for MARLA   RECOMMEND: (1)No objection to reinitiation of diuretics at this point - can add Bumex 1mg po bid as was being taken prior to admission (2)Dose new meds for GFR 30ml/min (present dosing is acceptable) (3)CABG 11/9/20 as planned     Ignacio Morataya MD [facility] Office: [phone] Cell: [phone]       No pain, no SOB. daughter at bedside   VITAL: T(C): , Max: 36.9 (11-06-20 @ 04:25) T(F): , Max: 98.4 (11-06-20 @ 04:25) HR: 66 (11-06-20 @ 04:25) BP: 136/63 (11-06-20 @ 04:25) RR: 18 (11-06-20 @ 04:25) SpO2: 94% (11-06-20 @ 04:25)   PHYSICAL EXAM: Constitutional: NAD, alert, obese HEENT: NCAT, MMM Neck: Supple, No JVD Respiratory: CTA-b/l Cardiovascular: RRR s1s2, no m/r/g Gastrointestinal: BS+, soft, NT/ND Extremities: No peripheral edema b/l Neurological: no focal deficits; strength grossly intact Back: no CVAT b/l Skin: No rashes, no nevi   LABS:                      12.3  5.37  )-----------( 285      ( 05 Nov 2020 04:31 )            39.6   Na(138)/K(4.3)/Cl(99)/HCO3(27)/BUN(43)/Cr(1.89)Glu(214)/Ca(9.3)/Mg(--)/PO4(--)    11-05 @ 16:51 Na(139)/K(4.2)/Cl(99)/HCO3(28)/BUN(42)/Cr(1.87)Glu(254)/Ca(9.4)/Mg(--)/PO4(--)    11-05 @ 13:16 Na(135)/K(4.5)/Cl(97)/HCO3(27)/BUN(44)/Cr(2.04)Glu(249)/Ca(9.7)/Mg(--)/PO4(--)    11-05 @ 04:31 Na(139)/K(3.9)/Cl(101)/HCO3(26)/BUN(29)/Cr(1.65)Glu(109)/Ca(9.3)/Mg(--)/PO4(--)    11-04 @ 06:37  CATH11/5/2020:  s/p dx cath via LFA, mLM 20%, LAD moderate, ostial Cx 80%, ostial RCA 90%- CTS  consult pending    IMPRESSION: 80F w/ HTN, DM2, CKD3a, and HFpEF, 11/2/20 a/w right hip fracture s/p mechanical fall  (1)Renal - stage 3b with non-nephrotic range proteinuria; mild superimposed prerenal azotemia of late. No evidence of contrast nephropathy s/p cath yesterday  (2)Cardiac - s/p cath yesterday - severe multivessel disease - now scheduled for CABG 11/9 with Dr. William Heath  (3)Ortho - right hip fracture - Ortho input appreciated - will have to wait at least 4 weeks post CABG for MARLA   RECOMMEND: (1)No objection to reinitiation of diuretics at this point - can add Bumex 1mg po bid as was being taken prior to admission (2)Dose new meds for GFR 30ml/min (present dosing is acceptable) (3)CABG 11/9/20 as planned     Ignacio Morataya MD [facility] Office: [phone] Cell: [phone]       No pain, no SOB. daughter at bedside   VITAL: T(C): , Max: 36.9 (11-06-20 @ 04:25) T(F): , Max: 98.4 (11-06-20 @ 04:25) HR: 66 (11-06-20 @ 04:25) BP: 136/63 (11-06-20 @ 04:25) RR: 18 (11-06-20 @ 04:25) SpO2: 94% (11-06-20 @ 04:25)   PHYSICAL EXAM: Constitutional: NAD, alert, obese HEENT: NCAT, MMM Neck: Supple, No JVD Respiratory: CTA-b/l Cardiovascular: RRR s1s2, no m/r/g Gastrointestinal: BS+, soft, NT/ND Extremities: No peripheral edema b/l Neurological: no focal deficits; strength grossly intact Back: no CVAT b/l Skin: No rashes, no nevi   LABS:                      12.3  5.37  )-----------( 285      ( 05 Nov 2020 04:31 )            39.6   Na(138)/K(4.3)/Cl(99)/HCO3(27)/BUN(43)/Cr(1.89)Glu(214)/Ca(9.3)/Mg(--)/PO4(--)    11-05 @ 16:51 Na(139)/K(4.2)/Cl(99)/HCO3(28)/BUN(42)/Cr(1.87)Glu(254)/Ca(9.4)/Mg(--)/PO4(--)    11-05 @ 13:16 Na(135)/K(4.5)/Cl(97)/HCO3(27)/BUN(44)/Cr(2.04)Glu(249)/Ca(9.7)/Mg(--)/PO4(--)    11-05 @ 04:31 Na(139)/K(3.9)/Cl(101)/HCO3(26)/BUN(29)/Cr(1.65)Glu(109)/Ca(9.3)/Mg(--)/PO4(--)    11-04 @ 06:37  CATH11/5/2020:  s/p dx cath via LFA, mLM 20%, LAD moderate, ostial Cx 80%, ostial RCA 90%- CTS  consult pending    IMPRESSION: 80F w/ HTN, DM2, CKD3a, and HFpEF, 11/2/20 a/w right hip fracture s/p mechanical fall  (1)Renal - stage 3b with non-nephrotic range proteinuria; mild superimposed prerenal azotemia of late. No evidence of contrast nephropathy s/p cath yesterday  (2)Cardiac - s/p cath yesterday - severe multivessel disease - now scheduled for CABG 11/9 with Dr. William Heath  (3)Ortho - right hip fracture - Ortho input appreciated - will have to wait at least 4 weeks post CABG for MARLA   RECOMMEND: (1)No objection to reinitiation of diuretics at this point - can add Bumex 1mg po bid as was being taken prior to admission (2)Dose new meds for GFR 30ml/min (present dosing is acceptable) (3)No objection to CABG 11/9/20 as planned, provided creatinine remains below 2.5mg/dL.      Ignacio Morataya MD [facility] Office: [phone] Cell: [phone]

## 2020-11-06 NOTE — PROGRESS NOTE ADULT - ATTENDING COMMENTS
Plan for cardiac cath today.  Plan for OR monday Cardiac cath shows significant ischemia.  Indicated for CABAG.  No MARLA until pt is cardiac optimized.  Likely will have to wait 4 weeks post CABG for MARLA

## 2020-11-07 LAB
ANION GAP SERPL CALC-SCNC: 12 MMOL/L — SIGNIFICANT CHANGE UP (ref 5–17)
BUN SERPL-MCNC: 62 MG/DL — HIGH (ref 7–23)
CALCIUM SERPL-MCNC: 9.1 MG/DL — SIGNIFICANT CHANGE UP (ref 8.4–10.5)
CHLORIDE SERPL-SCNC: 96 MMOL/L — SIGNIFICANT CHANGE UP (ref 96–108)
CHOLEST SERPL-MCNC: 155 MG/DL — SIGNIFICANT CHANGE UP
CO2 SERPL-SCNC: 26 MMOL/L — SIGNIFICANT CHANGE UP (ref 22–31)
CREAT SERPL-MCNC: 2.22 MG/DL — HIGH (ref 0.5–1.3)
GLUCOSE BLDC GLUCOMTR-MCNC: 120 MG/DL — HIGH (ref 70–99)
GLUCOSE BLDC GLUCOMTR-MCNC: 125 MG/DL — HIGH (ref 70–99)
GLUCOSE BLDC GLUCOMTR-MCNC: 179 MG/DL — HIGH (ref 70–99)
GLUCOSE BLDC GLUCOMTR-MCNC: 194 MG/DL — HIGH (ref 70–99)
GLUCOSE SERPL-MCNC: 174 MG/DL — HIGH (ref 70–99)
HCT VFR BLD CALC: 40.1 % — SIGNIFICANT CHANGE UP (ref 34.5–45)
HDLC SERPL-MCNC: 55 MG/DL — SIGNIFICANT CHANGE UP
HGB BLD-MCNC: 12.6 G/DL — SIGNIFICANT CHANGE UP (ref 11.5–15.5)
LIPID PNL WITH DIRECT LDL SERPL: 73 MG/DL — SIGNIFICANT CHANGE UP
MCHC RBC-ENTMCNC: 29.5 PG — SIGNIFICANT CHANGE UP (ref 27–34)
MCHC RBC-ENTMCNC: 31.4 GM/DL — LOW (ref 32–36)
MCV RBC AUTO: 93.9 FL — SIGNIFICANT CHANGE UP (ref 80–100)
NON HDL CHOLESTEROL: 100 MG/DL — SIGNIFICANT CHANGE UP
NRBC # BLD: 0 /100 WBCS — SIGNIFICANT CHANGE UP (ref 0–0)
PLATELET # BLD AUTO: 274 K/UL — SIGNIFICANT CHANGE UP (ref 150–400)
POTASSIUM SERPL-MCNC: 4.1 MMOL/L — SIGNIFICANT CHANGE UP (ref 3.5–5.3)
POTASSIUM SERPL-SCNC: 4.1 MMOL/L — SIGNIFICANT CHANGE UP (ref 3.5–5.3)
RBC # BLD: 4.27 M/UL — SIGNIFICANT CHANGE UP (ref 3.8–5.2)
RBC # FLD: 15.1 % — HIGH (ref 10.3–14.5)
SODIUM SERPL-SCNC: 134 MMOL/L — LOW (ref 135–145)
TRIGL SERPL-MCNC: 136 MG/DL — SIGNIFICANT CHANGE UP
WBC # BLD: 10.75 K/UL — HIGH (ref 3.8–10.5)
WBC # FLD AUTO: 10.75 K/UL — HIGH (ref 3.8–10.5)

## 2020-11-07 PROCEDURE — 93010 ELECTROCARDIOGRAM REPORT: CPT

## 2020-11-07 PROCEDURE — 99232 SBSQ HOSP IP/OBS MODERATE 35: CPT

## 2020-11-07 RX ORDER — POLYETHYLENE GLYCOL 3350 17 G/17G
17 POWDER, FOR SOLUTION ORAL DAILY
Refills: 0 | Status: DISCONTINUED | OUTPATIENT
Start: 2020-11-07 | End: 2020-11-09

## 2020-11-07 RX ORDER — AMLODIPINE BESYLATE 2.5 MG/1
5 TABLET ORAL DAILY
Refills: 0 | Status: DISCONTINUED | OUTPATIENT
Start: 2020-11-07 | End: 2020-11-09

## 2020-11-07 RX ADMIN — Medication 975 MILLIGRAM(S): at 00:16

## 2020-11-07 RX ADMIN — GABAPENTIN 300 MILLIGRAM(S): 400 CAPSULE ORAL at 17:17

## 2020-11-07 RX ADMIN — GABAPENTIN 300 MILLIGRAM(S): 400 CAPSULE ORAL at 05:36

## 2020-11-07 RX ADMIN — ISOSORBIDE MONONITRATE 60 MILLIGRAM(S): 60 TABLET, EXTENDED RELEASE ORAL at 12:12

## 2020-11-07 RX ADMIN — Medication 3 UNIT(S): at 12:11

## 2020-11-07 RX ADMIN — POLYETHYLENE GLYCOL 3350 17 GRAM(S): 17 POWDER, FOR SOLUTION ORAL at 14:29

## 2020-11-07 RX ADMIN — Medication 3 UNIT(S): at 08:14

## 2020-11-07 RX ADMIN — Medication 3 UNIT(S): at 17:17

## 2020-11-07 RX ADMIN — Medication 81 MILLIGRAM(S): at 11:02

## 2020-11-07 RX ADMIN — Medication 25 MILLIGRAM(S): at 05:17

## 2020-11-07 RX ADMIN — Medication 1: at 08:13

## 2020-11-07 RX ADMIN — ATORVASTATIN CALCIUM 10 MILLIGRAM(S): 80 TABLET, FILM COATED ORAL at 22:39

## 2020-11-07 RX ADMIN — INSULIN GLARGINE 7 UNIT(S): 100 INJECTION, SOLUTION SUBCUTANEOUS at 22:38

## 2020-11-07 RX ADMIN — Medication 975 MILLIGRAM(S): at 22:39

## 2020-11-07 RX ADMIN — MUPIROCIN 1 APPLICATION(S): 20 OINTMENT TOPICAL at 05:17

## 2020-11-07 RX ADMIN — Medication 100 MILLIGRAM(S): at 17:17

## 2020-11-07 RX ADMIN — MUPIROCIN 1 APPLICATION(S): 20 OINTMENT TOPICAL at 17:16

## 2020-11-07 RX ADMIN — Medication 100 MILLIGRAM(S): at 05:17

## 2020-11-07 RX ADMIN — Medication 1: at 12:11

## 2020-11-07 RX ADMIN — ENOXAPARIN SODIUM 30 MILLIGRAM(S): 100 INJECTION SUBCUTANEOUS at 11:02

## 2020-11-07 RX ADMIN — SENNA PLUS 2 TABLET(S): 8.6 TABLET ORAL at 22:39

## 2020-11-07 RX ADMIN — AMLODIPINE BESYLATE 5 MILLIGRAM(S): 2.5 TABLET ORAL at 08:13

## 2020-11-07 NOTE — PROGRESS NOTE ADULT - SUBJECTIVE AND OBJECTIVE BOX
VITAL SIGNS    Telemetry:    Vital Signs Last 24 Hrs  T(C): 36.4 (11-07-20 @ 05:00), Max: 36.8 (11-06-20 @ 12:35)  T(F): 97.5 (11-07-20 @ 05:00), Max: 98.2 (11-06-20 @ 12:35)  HR: 66 (11-07-20 @ 05:43) (60 - 82)  BP: 168/76 (11-07-20 @ 05:43) (130/71 - 182/76)  RR: 18 (11-07-20 @ 05:00) (18 - 19)  SpO2: 95% (11-07-20 @ 05:00) (94% - 95%)            11-06 @ 07:01  -  11-07 @ 07:00  --------------------------------------------------------  IN: 480 mL / OUT: 1000 mL / NET: -520 mL    11-07 @ 07:01  -  11-07 @ 11:07  --------------------------------------------------------  IN: 177 mL / OUT: 0 mL / NET: 177 mL       Daily     Daily   Admit Wt: Drug Dosing Weight  Height (cm): 152.4 (02 Nov 2020 21:44)  Weight (kg): 89.4 (06 Nov 2020 18:04)  BMI (kg/m2): 38.5 (06 Nov 2020 18:04)  BSA (m2): 1.86 (06 Nov 2020 18:04)      CAPILLARY BLOOD GLUCOSE      POCT Blood Glucose.: 179 mg/dL (07 Nov 2020 08:02)  POCT Blood Glucose.: 336 mg/dL (06 Nov 2020 21:36)  POCT Blood Glucose.: 337 mg/dL (06 Nov 2020 17:16)  POCT Blood Glucose.: 354 mg/dL (06 Nov 2020 12:04)          acetaminophen   Tablet .. 975 milliGRAM(s) Oral every 8 hours  amLODIPine   Tablet 5 milliGRAM(s) Oral daily  aspirin enteric coated 81 milliGRAM(s) Oral daily  atorvastatin 10 milliGRAM(s) Oral at bedtime  dextrose 40% Gel 15 Gram(s) Oral once PRN  dextrose 5%. 1000 milliLiter(s) IV Continuous <Continuous>  dextrose 50% Injectable 12.5 Gram(s) IV Push once  dextrose 50% Injectable 25 Gram(s) IV Push once  dextrose 50% Injectable 25 Gram(s) IV Push once  enoxaparin Injectable 30 milliGRAM(s) SubCutaneous daily  gabapentin 300 milliGRAM(s) Oral two times a day  glucagon  Injectable 1 milliGRAM(s) IntraMuscular once PRN  hydrALAZINE 100 milliGRAM(s) Oral two times a day  influenza   Vaccine 0.5 milliLiter(s) IntraMuscular once  insulin glargine Injectable (LANTUS) 7 Unit(s) SubCutaneous at bedtime  insulin lispro (ADMELOG) corrective regimen sliding scale   SubCutaneous three times a day before meals  insulin lispro (ADMELOG) corrective regimen sliding scale   SubCutaneous at bedtime  insulin lispro Injectable (ADMELOG) 3 Unit(s) SubCutaneous before breakfast  insulin lispro Injectable (ADMELOG) 3 Unit(s) SubCutaneous before lunch  insulin lispro Injectable (ADMELOG) 3 Unit(s) SubCutaneous before dinner  isosorbide   mononitrate ER Tablet (IMDUR) 60 milliGRAM(s) Oral daily  magnesium hydroxide Suspension 30 milliLiter(s) Oral daily PRN  melatonin 3 milliGRAM(s) Oral at bedtime PRN  metoprolol succinate ER 25 milliGRAM(s) Oral daily  mupirocin 2% Ointment 1 Application(s) Topical two times a day  oxyCODONE    IR 2.5 milliGRAM(s) Oral every 4 hours PRN  oxyCODONE    IR 5 milliGRAM(s) Oral every 4 hours PRN  oxymetazoline 0.05% Nasal Spray 1 Spray(s) Both Nostrils once PRN  senna 2 Tablet(s) Oral at bedtime  traMADol 50 milliGRAM(s) Oral every 6 hours PRN      PHYSICAL EXAM    Subjective: "Hi.   Neurology: alert and oriented x 3, nonfocal, no gross deficits  CV : tele:  RSR  Sternal Wound :  CDI with dressing , Stable  Lungs: clear. RR easy, unlabored   Abdomen: soft, nontender, nondistended, positive bowel sounds, bowel movement   Neg N/V/D   :  pt voiding without difficulty   Extremities:   CUEVAS; edema, neg calf tenderness.   PPP bilaterally      PW:  Chest tubes:                 VITAL SIGNS    Telemetry: rsr 60- 70    Vital Signs Last 24 Hrs  T(C): 36.4 (11-07-20 @ 05:00), Max: 36.8 (11-06-20 @ 12:35)  T(F): 97.5 (11-07-20 @ 05:00), Max: 98.2 (11-06-20 @ 12:35)  HR: 66 (11-07-20 @ 05:43) (60 - 82)  BP: 168/76 (11-07-20 @ 05:43) (130/71 - 182/76)  RR: 18 (11-07-20 @ 05:00) (18 - 19)  SpO2: 95% (11-07-20 @ 05:00) (94% - 95%)            11-06 @ 07:01  -  11-07 @ 07:00  --------------------------------------------------------  IN: 480 mL / OUT: 1000 mL / NET: -520 mL    11-07 @ 07:01  -  11-07 @ 11:07  --------------------------------------------------------  IN: 177 mL / OUT: 0 mL / NET: 177 mL       Daily     Daily   Admit Wt: Drug Dosing Weight  Height (cm): 152.4 (02 Nov 2020 21:44)  Weight (kg): 89.4 (06 Nov 2020 18:04)  BMI (kg/m2): 38.5 (06 Nov 2020 18:04)  BSA (m2): 1.86 (06 Nov 2020 18:04)      CAPILLARY BLOOD GLUCOSE      POCT Blood Glucose.: 179 mg/dL (07 Nov 2020 08:02)  POCT Blood Glucose.: 336 mg/dL (06 Nov 2020 21:36)  POCT Blood Glucose.: 337 mg/dL (06 Nov 2020 17:16)  POCT Blood Glucose.: 354 mg/dL (06 Nov 2020 12:04)          acetaminophen   Tablet .. 975 milliGRAM(s) Oral every 8 hours  amLODIPine   Tablet 5 milliGRAM(s) Oral daily  aspirin enteric coated 81 milliGRAM(s) Oral daily  atorvastatin 10 milliGRAM(s) Oral at bedtime  dextrose 40% Gel 15 Gram(s) Oral once PRN  dextrose 5%. 1000 milliLiter(s) IV Continuous <Continuous>  dextrose 50% Injectable 12.5 Gram(s) IV Push once  dextrose 50% Injectable 25 Gram(s) IV Push once  dextrose 50% Injectable 25 Gram(s) IV Push once  enoxaparin Injectable 30 milliGRAM(s) SubCutaneous daily  gabapentin 300 milliGRAM(s) Oral two times a day  glucagon  Injectable 1 milliGRAM(s) IntraMuscular once PRN  hydrALAZINE 100 milliGRAM(s) Oral two times a day  influenza   Vaccine 0.5 milliLiter(s) IntraMuscular once  insulin glargine Injectable (LANTUS) 7 Unit(s) SubCutaneous at bedtime  insulin lispro (ADMELOG) corrective regimen sliding scale   SubCutaneous three times a day before meals  insulin lispro (ADMELOG) corrective regimen sliding scale   SubCutaneous at bedtime  insulin lispro Injectable (ADMELOG) 3 Unit(s) SubCutaneous before breakfast  insulin lispro Injectable (ADMELOG) 3 Unit(s) SubCutaneous before lunch  insulin lispro Injectable (ADMELOG) 3 Unit(s) SubCutaneous before dinner  isosorbide   mononitrate ER Tablet (IMDUR) 60 milliGRAM(s) Oral daily  magnesium hydroxide Suspension 30 milliLiter(s) Oral daily PRN  melatonin 3 milliGRAM(s) Oral at bedtime PRN  metoprolol succinate ER 25 milliGRAM(s) Oral daily  mupirocin 2% Ointment 1 Application(s) Topical two times a day  oxyCODONE    IR 2.5 milliGRAM(s) Oral every 4 hours PRN  oxyCODONE    IR 5 milliGRAM(s) Oral every 4 hours PRN  oxymetazoline 0.05% Nasal Spray 1 Spray(s) Both Nostrils once PRN  senna 2 Tablet(s) Oral at bedtime  traMADol 50 milliGRAM(s) Oral every 6 hours PRN      PHYSICAL EXAM    Subjective: "Hi."   Neurology: alert and oriented x 3, nonfocal, no gross deficits  CV : tele:  RSR 60-70   Lungs: clear. RR easy, unlabored   Abdomen: soft, nontender, nondistended, positive bowel sounds, + bowel movement   Neg N/V/D   :  pt voiding without difficulty   Extremities:   CUEVAS; neg LE edema, neg calf tenderness.   PPP bilaterally

## 2020-11-07 NOTE — PROGRESS NOTE ADULT - ASSESSMENT
79 y/o Vietnamese speaking F w/ PMHx of DVT on Eliquis, HTN, HLD, DM, renal insufficiency, rectal cancer (s/p chemo/radiation and colectomy '15), breast CA (s/p chemo & R mastectomy 1989) presented to [facility] ED c/o acute SOB x 1 hour on 11/3, also c/o R hip and R wrist pain s/p mechanical fall at home 3 weeks ago. Pt was admitted and found to have significant degenerative disease of the R hip and was planned for MARLA. On pre-op cardiac workup for MARLA, pt was found to have an abnormal stress test and was referred for cardiac cath. Cath performed 11/5 showed 60% stenosis of the prox LAD and 90% stenosis of the ostial circumflex and ostial RCA.  CT surgery was consulted for CABG evaluation w/ Dr. Heath.     11/5: Pt seen and evaluated at bedside - VSS, NAD. Pt is amenable to CT surgery at this time. CT surgery pre-op workup in progress - will check pre-op labs and TTE. D/w Dr. Heath - pt scheduled for OR Monday 11/9. Recommend initiating ASA 81 mg QD pre-op and continuation of Statin and beta blocker. Continue care as per primary team. 79 y/o Yakut speaking F w/ PMHx of DVT on Eliquis, HTN, HLD, DM, renal insufficiency, rectal cancer (s/p chemo/radiation and colectomy '15), breast CA (s/p chemo & R mastectomy 1989) presented to [facility] ED c/o acute SOB x 1 hour on 11/3, also c/o R hip and R wrist pain s/p mechanical fall at home 3 weeks ago. Pt was admitted and found to have significant degenerative disease of the R hip and was planned for MARLA. On pre-op cardiac workup for MARLA, pt was found to have an abnormal stress test and was referred for cardiac cath. Cath performed 11/5 showed 60% stenosis of the prox LAD and 90% stenosis of the ostial circumflex and ostial RCA.  CT surgery was consulted for CABG evaluation w/ Dr. Heath.   11/5: Pt seen and evaluated at bedside - VSS, NAD. Pt is amenable to CT surgery at this time. CT surgery pre-op workup in progress - will check pre-op labs and Echo mild MR;  D/w Dr. Heath - pt scheduled for OR Monday 11/9. Recommend initiating ASA 81 mg QD pre-op and continuation of Statin and beta blocker.   11/7 preop workup in progress; continue asa/ statin/b-blockers  mrsa nares- on bactroban  ck covid in am   [facility] 11/9 with Dr. Heath

## 2020-11-07 NOTE — PROGRESS NOTE ADULT - PROBLEM SELECTOR PLAN 1
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 Heath

## 2020-11-07 NOTE — PROGRESS NOTE ADULT - SUBJECTIVE AND OBJECTIVE BOX
Subjective: Patient seen and examined. No new events except as noted.     SUBJECTIVE/ROS:  feels ok     MEDICATIONS:  MEDICATIONS  (STANDING):  acetaminophen   Tablet .. 975 milliGRAM(s) Oral every 8 hours  amLODIPine   Tablet 5 milliGRAM(s) Oral daily  aspirin enteric coated 81 milliGRAM(s) Oral daily  atorvastatin 10 milliGRAM(s) Oral at bedtime  dextrose 5%. 1000 milliLiter(s) (50 mL/Hr) IV Continuous <Continuous>  dextrose 50% Injectable 12.5 Gram(s) IV Push once  dextrose 50% Injectable 25 Gram(s) IV Push once  dextrose 50% Injectable 25 Gram(s) IV Push once  enoxaparin Injectable 30 milliGRAM(s) SubCutaneous daily  gabapentin 300 milliGRAM(s) Oral two times a day  hydrALAZINE 100 milliGRAM(s) Oral two times a day  influenza   Vaccine 0.5 milliLiter(s) IntraMuscular once  insulin glargine Injectable (LANTUS) 7 Unit(s) SubCutaneous at bedtime  insulin lispro (ADMELOG) corrective regimen sliding scale   SubCutaneous three times a day before meals  insulin lispro (ADMELOG) corrective regimen sliding scale   SubCutaneous at bedtime  insulin lispro Injectable (ADMELOG) 3 Unit(s) SubCutaneous before breakfast  insulin lispro Injectable (ADMELOG) 3 Unit(s) SubCutaneous before lunch  insulin lispro Injectable (ADMELOG) 3 Unit(s) SubCutaneous before dinner  isosorbide   mononitrate ER Tablet (IMDUR) 60 milliGRAM(s) Oral daily  metoprolol succinate ER 25 milliGRAM(s) Oral daily  mupirocin 2% Ointment 1 Application(s) Topical two times a day  senna 2 Tablet(s) Oral at bedtime      PHYSICAL EXAM:  T(C): 36.4 (11-07-20 @ 05:00), Max: 36.8 (11-06-20 @ 12:35)  HR: 66 (11-07-20 @ 05:43) (60 - 82)  BP: 168/76 (11-07-20 @ 05:43) (130/71 - 182/76)  RR: 18 (11-07-20 @ 05:00) (18 - 19)  SpO2: 95% (11-07-20 @ 05:00) (94% - 95%)  Wt(kg): --  I&O's Summary    05 Nov 2020 07:01  -  06 Nov 2020 07:00  --------------------------------------------------------  IN: 740 mL / OUT: 850 mL / NET: -110 mL    06 Nov 2020 07:01  -  07 Nov 2020 06:48  --------------------------------------------------------  IN: 480 mL / OUT: 300 mL / NET: 180 mL        Weight (kg): 89.4 (11-06 @ 18:04)      JVP: Normal  Neck: supple  Lung: clear   CV: S1 S2 , Murmur:  Abd: soft  Ext: No edema  neuro: Awake / alert  Psych: flat affect  Skin: normal``    LABS/DATA:    CARDIAC MARKERS:            11-05    138  |  99  |  43<H>  ----------------------------<  214<H>  4.3   |  27  |  1.89<H>    Ca    9.3      05 Nov 2020 16:51    TPro  6.9  /  Alb  3.9  /  TBili  0.3  /  DBili  <0.1  /  AST  11  /  ALT  9<L>  /  AlkPhos  71  11-05    proBNP:   Lipid Profile:   HgA1c:   TSH: Thyroid Stimulating Hormone, Serum: 0.21 uIU/mL (11-06 @ 09:59)      TELE:  EKG:

## 2020-11-08 LAB
ANION GAP SERPL CALC-SCNC: 13 MMOL/L — SIGNIFICANT CHANGE UP (ref 5–17)
BLD GP AB SCN SERPL QL: NEGATIVE — SIGNIFICANT CHANGE UP
BUN SERPL-MCNC: 59 MG/DL — HIGH (ref 7–23)
CALCIUM SERPL-MCNC: 9 MG/DL — SIGNIFICANT CHANGE UP (ref 8.4–10.5)
CHLORIDE SERPL-SCNC: 100 MMOL/L — SIGNIFICANT CHANGE UP (ref 96–108)
CO2 SERPL-SCNC: 26 MMOL/L — SIGNIFICANT CHANGE UP (ref 22–31)
CREAT SERPL-MCNC: 1.75 MG/DL — HIGH (ref 0.5–1.3)
GLUCOSE BLDC GLUCOMTR-MCNC: 156 MG/DL — HIGH (ref 70–99)
GLUCOSE BLDC GLUCOMTR-MCNC: 158 MG/DL — HIGH (ref 70–99)
GLUCOSE BLDC GLUCOMTR-MCNC: 168 MG/DL — HIGH (ref 70–99)
GLUCOSE BLDC GLUCOMTR-MCNC: 210 MG/DL — HIGH (ref 70–99)
GLUCOSE SERPL-MCNC: 163 MG/DL — HIGH (ref 70–99)
HCT VFR BLD CALC: 41.9 % — SIGNIFICANT CHANGE UP (ref 34.5–45)
HGB BLD-MCNC: 13.2 G/DL — SIGNIFICANT CHANGE UP (ref 11.5–15.5)
INR BLD: 0.9 RATIO — SIGNIFICANT CHANGE UP (ref 0.88–1.16)
MCHC RBC-ENTMCNC: 29.4 PG — SIGNIFICANT CHANGE UP (ref 27–34)
MCHC RBC-ENTMCNC: 31.5 GM/DL — LOW (ref 32–36)
MCV RBC AUTO: 93.3 FL — SIGNIFICANT CHANGE UP (ref 80–100)
NRBC # BLD: 0 /100 WBCS — SIGNIFICANT CHANGE UP (ref 0–0)
PLATELET # BLD AUTO: 257 K/UL — SIGNIFICANT CHANGE UP (ref 150–400)
POTASSIUM SERPL-MCNC: 4.2 MMOL/L — SIGNIFICANT CHANGE UP (ref 3.5–5.3)
POTASSIUM SERPL-SCNC: 4.2 MMOL/L — SIGNIFICANT CHANGE UP (ref 3.5–5.3)
PROTHROM AB SERPL-ACNC: 10.9 SEC — SIGNIFICANT CHANGE UP (ref 10.6–13.6)
RBC # BLD: 4.49 M/UL — SIGNIFICANT CHANGE UP (ref 3.8–5.2)
RBC # FLD: 15.2 % — HIGH (ref 10.3–14.5)
RH IG SCN BLD-IMP: POSITIVE — SIGNIFICANT CHANGE UP
SARS-COV-2 RNA SPEC QL NAA+PROBE: SIGNIFICANT CHANGE UP
SODIUM SERPL-SCNC: 139 MMOL/L — SIGNIFICANT CHANGE UP (ref 135–145)
WBC # BLD: 9.69 K/UL — SIGNIFICANT CHANGE UP (ref 3.8–10.5)
WBC # FLD AUTO: 9.69 K/UL — SIGNIFICANT CHANGE UP (ref 3.8–10.5)

## 2020-11-08 PROCEDURE — 99232 SBSQ HOSP IP/OBS MODERATE 35: CPT

## 2020-11-08 RX ORDER — CHLORHEXIDINE GLUCONATE 213 G/1000ML
1 SOLUTION TOPICAL ONCE
Refills: 0 | Status: COMPLETED | OUTPATIENT
Start: 2020-11-09 | End: 2020-11-09

## 2020-11-08 RX ORDER — CHLORHEXIDINE GLUCONATE 213 G/1000ML
1 SOLUTION TOPICAL ONCE
Refills: 0 | Status: COMPLETED | OUTPATIENT
Start: 2020-11-08 | End: 2020-11-08

## 2020-11-08 RX ORDER — ATORVASTATIN CALCIUM 80 MG/1
40 TABLET, FILM COATED ORAL AT BEDTIME
Refills: 0 | Status: DISCONTINUED | OUTPATIENT
Start: 2020-11-08 | End: 2020-11-09

## 2020-11-08 RX ORDER — VANCOMYCIN HCL 1 G
1250 VIAL (EA) INTRAVENOUS ONCE
Refills: 0 | Status: DISCONTINUED | OUTPATIENT
Start: 2020-11-08 | End: 2020-11-09

## 2020-11-08 RX ORDER — ENOXAPARIN SODIUM 100 MG/ML
30 INJECTION SUBCUTANEOUS ONCE
Refills: 0 | Status: COMPLETED | OUTPATIENT
Start: 2020-11-08 | End: 2020-11-08

## 2020-11-08 RX ORDER — CHLORHEXIDINE GLUCONATE 213 G/1000ML
30 SOLUTION TOPICAL ONCE
Refills: 0 | Status: DISCONTINUED | OUTPATIENT
Start: 2020-11-08 | End: 2020-11-09

## 2020-11-08 RX ADMIN — MUPIROCIN 1 APPLICATION(S): 20 OINTMENT TOPICAL at 05:16

## 2020-11-08 RX ADMIN — OXYCODONE HYDROCHLORIDE 5 MILLIGRAM(S): 5 TABLET ORAL at 23:00

## 2020-11-08 RX ADMIN — CHLORHEXIDINE GLUCONATE 1 APPLICATION(S): 213 SOLUTION TOPICAL at 22:08

## 2020-11-08 RX ADMIN — POLYETHYLENE GLYCOL 3350 17 GRAM(S): 17 POWDER, FOR SOLUTION ORAL at 11:39

## 2020-11-08 RX ADMIN — Medication 3 UNIT(S): at 17:29

## 2020-11-08 RX ADMIN — ISOSORBIDE MONONITRATE 60 MILLIGRAM(S): 60 TABLET, EXTENDED RELEASE ORAL at 11:38

## 2020-11-08 RX ADMIN — OXYCODONE HYDROCHLORIDE 5 MILLIGRAM(S): 5 TABLET ORAL at 22:23

## 2020-11-08 RX ADMIN — Medication 3 MILLIGRAM(S): at 22:03

## 2020-11-08 RX ADMIN — OXYCODONE HYDROCHLORIDE 5 MILLIGRAM(S): 5 TABLET ORAL at 09:06

## 2020-11-08 RX ADMIN — OXYCODONE HYDROCHLORIDE 5 MILLIGRAM(S): 5 TABLET ORAL at 08:16

## 2020-11-08 RX ADMIN — Medication 100 MILLIGRAM(S): at 16:50

## 2020-11-08 RX ADMIN — SENNA PLUS 2 TABLET(S): 8.6 TABLET ORAL at 21:14

## 2020-11-08 RX ADMIN — Medication 975 MILLIGRAM(S): at 22:30

## 2020-11-08 RX ADMIN — ATORVASTATIN CALCIUM 40 MILLIGRAM(S): 80 TABLET, FILM COATED ORAL at 22:03

## 2020-11-08 RX ADMIN — OXYCODONE HYDROCHLORIDE 2.5 MILLIGRAM(S): 5 TABLET ORAL at 01:15

## 2020-11-08 RX ADMIN — Medication 100 MILLIGRAM(S): at 05:15

## 2020-11-08 RX ADMIN — Medication 3 UNIT(S): at 08:17

## 2020-11-08 RX ADMIN — ENOXAPARIN SODIUM 30 MILLIGRAM(S): 100 INJECTION SUBCUTANEOUS at 11:50

## 2020-11-08 RX ADMIN — INSULIN GLARGINE 7 UNIT(S): 100 INJECTION, SOLUTION SUBCUTANEOUS at 22:08

## 2020-11-08 RX ADMIN — AMLODIPINE BESYLATE 5 MILLIGRAM(S): 2.5 TABLET ORAL at 05:14

## 2020-11-08 RX ADMIN — Medication 1: at 17:29

## 2020-11-08 RX ADMIN — Medication 1: at 08:17

## 2020-11-08 RX ADMIN — Medication 81 MILLIGRAM(S): at 11:39

## 2020-11-08 RX ADMIN — GABAPENTIN 300 MILLIGRAM(S): 400 CAPSULE ORAL at 05:14

## 2020-11-08 RX ADMIN — Medication 3 UNIT(S): at 12:00

## 2020-11-08 RX ADMIN — Medication 975 MILLIGRAM(S): at 05:15

## 2020-11-08 RX ADMIN — GABAPENTIN 300 MILLIGRAM(S): 400 CAPSULE ORAL at 16:50

## 2020-11-08 RX ADMIN — Medication 975 MILLIGRAM(S): at 22:03

## 2020-11-08 RX ADMIN — Medication 25 MILLIGRAM(S): at 05:14

## 2020-11-08 RX ADMIN — OXYCODONE HYDROCHLORIDE 2.5 MILLIGRAM(S): 5 TABLET ORAL at 00:14

## 2020-11-08 RX ADMIN — Medication 1: at 12:00

## 2020-11-08 RX ADMIN — MUPIROCIN 1 APPLICATION(S): 20 OINTMENT TOPICAL at 16:50

## 2020-11-08 RX ADMIN — Medication 975 MILLIGRAM(S): at 00:10

## 2020-11-08 NOTE — PROGRESS NOTE ADULT - PROBLEM SELECTOR PLAN 1
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 Heath Continue asa 81 daily/ atorvastatin 40 HS/ Toprol-XL 25 daily  +mssa/mrsa nares- continue on bactroban  NPO p MN, pre-op chlorhexidine shower  Plan for CABG 2nd case on 11/9 with Dr. Heath

## 2020-11-08 NOTE — PROGRESS NOTE ADULT - ASSESSMENT
IMPRESSION: 80F w/ HTN, DM2, CKD3a, and HFpEF, 11/2/20 a/w right hip fracture s/p mechanical fall. 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 No evidence of contrast nephropathy. Creatinine trending down.     (2)Cardiac - severe multivessel disease - now scheduled for CABG 11/9 with Dr. William Heath    (3)Ortho - right hip fracture - Ortho input appreciated - will have to wait at least 4 weeks post CABG for MARLA      RECOMMEND:  (1)No objection to reinitiation of diuretics at this point - can add Bumex 1mg po bid as was being taken prior to admission  (2)Dose new meds for GFR 30ml/min (present dosing is acceptable)  (3)No objection to CABG tomorrow, provided creatinine remains below 2.5mg/dL.

## 2020-11-08 NOTE — PROGRESS NOTE ADULT - SUBJECTIVE AND OBJECTIVE BOX
Cardiac Surgery Pre-op Note:     Surgeon:    Procedure: (Date) (Procedure)    HPI:  Pt is a 80y Ecuadorean speaking Female presenting with R hip pain s/p mechanical fall 3 weeks ago. Pt interviewed with Ecuadorean . Discussed case with daughter Shauna as well. Pt has been walking since her fall 3 weeks ago with consistent pain. Pt is able to bear weight and walks with assistive devices. Pt presented today to ED for shortness of breath and is being admitted for medical workup. Of note, patient has R TKA done several years ago but cannot remember name of surgeon. Pt is a community ambulator at baseline. Pt denies numbness, tingling, or paresthesias in affected limb. Pt denies headstrike or LOC and denies any other orthopaedic injuries at this time. Pt takes Eliquis 5mg BID for previous DVT but is unsure when her last dose was. Denies fevers, dizziness, CP, SOB, N/V, calf pain.    ROS: 10 point review of systems otherwise negative unless noted in HPI    PMH:  Renal insufficiency    Elevated blood pressure reading in office with diagnosis of hypertension    DVT of leg (deep venous thrombosis)    CHF (congestive heart failure)    Type II diabetes mellitus    Obese    S/P radiation &gt; 12 weeks    S/P chemotherapy, time since greater than 12 weeks    Rectal cancer    Hyperlipidemia    Lymphedema of arm    Diabetes mellitus    HTN (hypertension)    Breast CA, right    Rectal cancer    Cancer    Lymphedema of arm    Colon cancer    Breast CA, right    Obesity (BMI 35.0-39.9 without comorbidity)    Hyperlipidemia    HTN (hypertension)    DM (diabetes mellitus)      PSH:  S/P TKR (total knee replacement), right    S/P colon resection    S/P ileostomy    History of appendectomy    S/P mastectomy, right    S/P colectomy    History of tonsillectomy    H/O mastectomy, right      AH:  penicillin (Angioedema)  CT scan dyes (hives)    Meds: See med rec    T(C): 37 (20 @ 06:53)  HR: 88 (20 @ 06:53)  BP: 160/71 (20 @ 06:53)  RR: 18 (20 @ 06:53)  SpO2: 97% (20 @ 06:53)  Wt(kg): --                        12.6   6.33  )-----------( 277      ( 2020 05:56 )             40.7     11-03    135  |  97  |  23  ----------------------------<  221<H>  4.8   |  23  |  1.46<H>    Ca    9.6      2020 05:56    TPro  7.4  /  Alb  3.9  /  TBili  0.4  /  DBili  x   /  AST  31  /  ALT  14  /  AlkPhos  79  11-02    PT/INR - ( 2020 05:56 )   PT: 13.1 sec;   INR: 1.10 ratio         PTT - ( 2020 05:56 )  PTT:31.4 sec  Urinalysis Basic - ( 2020 00:20 )    Color: Yellow / Appearance: Clear / S.018 / pH: x  Gluc: x / Ketone: Negative  / Bili: Negative / Urobili: Negative   Blood: x / Protein: 100 mg/dL / Nitrite: Negative   Leuk Esterase: Negative / RBC: 1 /hpf / WBC 2 /HPF   Sq Epi: x / Non Sq Epi: 1 /hpf / Bacteria: Negative          Physical Exam:  Gen: NAD, AAOx3, Ecuadorean speaking     RLE:   Skin intact  No edema, erythema, ecchymosis  No gross deformity  No bony TTP of Knee/Foot/Toes  Unable to SLR  Minimal pain with logroll/heelstrike  +EHL/FHL/TA/GS  SILT L2-S1  +DP/PT Pulses  Compartments soft and compressible  No calf TTP B/L    Secondary Assessment:  NC/AT, NTTP of clavicles, NTTP of C-,T-,L-Spine, NTTP of Pelvis  UEs: NTTP of Shoulders, Elbows, Wrists, Hands; NT with AROM/PROM of Shoulders, Elbows, Wrists, Hands; AIN/PIN/Med/Uln/Msc/Rad/Ax intact  LLE: Able to SLR, NT with Log Roll, NT with Heel Strike, NTTP of Hip, Knee, Ankle, Foot; NT with AROM/PROM of Hip, Knee, Ankle, Foot; Q/H/Gsc/TA/EHL/FHL intact    Imaging:  XR demonstrating R hip osteoarthritis  CT and MRI of R hip shows osteoarthritis of the R hip    Pt is a 80y Female with right hip osteoarthritis exacerbation; rule out hip fracture. MRI shows no acute fractures of the right hip.     -Admit to ortho under Dr. Guerrero  -Pain control  -WBAT RLE with ambulatory assist devices as needed  -Please document medical clearance for OR   -NPO with IVF on Wednesday for OR on 20  -SCDs  -Will discuss with Dr. Guerrero and advise as plan changes   (2020 12:35)      PAST MEDICAL & SURGICAL HISTORY:  Renal insufficiency    DVT of leg (deep venous thrombosis)  right calf DVT  2019 pt on Eliquis    CHF (congestive heart failure)    Type II diabetes mellitus    Obese    Rectal cancer  s/p chemotherapy and  radiation 12 weeks 2015 -3/2015    Hyperlipidemia    Lymphedema of arm  right arm s/p mastectomy    HTN (hypertension)    Breast CA, right   s/p mastectomy ,IV Chemotherapy    S/P TKR (total knee replacement), right      S/P colon resection      S/P ileostomy  low anterior resection-8/10/15, reversal of ileostomy 2016    History of appendectomy  1953    S/P mastectomy, right          MEDICATIONS  (STANDING):  acetaminophen   Tablet .. 975 milliGRAM(s) Oral every 8 hours  amLODIPine   Tablet 5 milliGRAM(s) Oral daily  aspirin enteric coated 81 milliGRAM(s) Oral daily  atorvastatin 10 milliGRAM(s) Oral at bedtime  chlorhexidine 0.12% Liquid 30 milliLiter(s) Swish and Spit once  chlorhexidine 4% Liquid 1 Application(s) Topical once  dextrose 5%. 1000 milliLiter(s) (50 mL/Hr) IV Continuous <Continuous>  dextrose 50% Injectable 12.5 Gram(s) IV Push once  dextrose 50% Injectable 25 Gram(s) IV Push once  dextrose 50% Injectable 25 Gram(s) IV Push once  enoxaparin Injectable 30 milliGRAM(s) SubCutaneous daily  gabapentin 300 milliGRAM(s) Oral two times a day  hydrALAZINE 100 milliGRAM(s) Oral two times a day  insulin glargine Injectable (LANTUS) 7 Unit(s) SubCutaneous at bedtime  insulin lispro (ADMELOG) corrective regimen sliding scale   SubCutaneous three times a day before meals  insulin lispro (ADMELOG) corrective regimen sliding scale   SubCutaneous at bedtime  insulin lispro Injectable (ADMELOG) 3 Unit(s) SubCutaneous before breakfast  insulin lispro Injectable (ADMELOG) 3 Unit(s) SubCutaneous before lunch  insulin lispro Injectable (ADMELOG) 3 Unit(s) SubCutaneous before dinner  isosorbide   mononitrate ER Tablet (IMDUR) 60 milliGRAM(s) Oral daily  metoprolol succinate ER 25 milliGRAM(s) Oral daily  mupirocin 2% Ointment 1 Application(s) Topical two times a day  polyethylene glycol 3350 17 Gram(s) Oral daily  senna 2 Tablet(s) Oral at bedtime  vancomycin  IVPB 1250 milliGRAM(s) IV Intermittent once    MEDICATIONS  (PRN):  dextrose 40% Gel 15 Gram(s) Oral once PRN Blood Glucose LESS THAN 70 milliGRAM(s)/deciliter  glucagon  Injectable 1 milliGRAM(s) IntraMuscular once PRN Glucose LESS THAN 70 milligrams/deciliter  magnesium hydroxide Suspension 30 milliLiter(s) Oral daily PRN Constipation  melatonin 3 milliGRAM(s) Oral at bedtime PRN Insomnia  oxyCODONE    IR 2.5 milliGRAM(s) Oral every 4 hours PRN Moderate Pain (4 - 6)  oxyCODONE    IR 5 milliGRAM(s) Oral every 4 hours PRN Severe Pain (7 - 10)  oxymetazoline 0.05% Nasal Spray 1 Spray(s) Both Nostrils once PRN Nasal Congestion  traMADol 50 milliGRAM(s) Oral every 6 hours PRN Mild Pain (1 - 3)      Vital Signs Last 24 Hrs  T(C): 36.7 (20 @ 05:13), Max: 36.7 (20 @ 12:23)  T(F): 98 (20 @ 05:13), Max: 98.1 (20 @ 12:23)  HR: 66 (20 @ 05:13) (56 - 67)  BP: 159/78 (20 @ 05:13) (138/73 - 175/82)  RR: 16 (20 @ 05:13) (16 - 18)  SpO2: 97% (20 @ 05:13) (93% - 97%)          ABO Interpretation: O ( @ 04:23)     Daily     Daily   Admit Wt: Drug Dosing Weight  Height (cm): 152.4 (2020 21:44)  Weight (kg): 89.4 (2020 18:04)  BMI (kg/m2): 38.5 (2020 18:04)  BSA (m2): 1.86 (2020 18:04)    Labs:                        13.2   9.69  )-----------( 257      ( 2020 05:51 )             41.9         139  |  100  |  59<H>  ----------------------------<  163<H>  4.2   |  26  |  1.75<H>    Ca    9.0      2020 05:51      PT/INR - ( 2020 05:54 )   PT: 10.9 sec;   INR: 0.90 ratio             ABO Interpretation: O ( @ 04:23)        Thyroid Panel:  @ 09:59/0.21  --/6.8/56   @ 01:53/0.22  --/7.8/57    MRSA: MRSA PCR Result.: Detected ( @ 09:21)   / MSSA:     P2Y12    CXR:     Carotid Duplex:      PFT's:    Echocardiogram:    Cardiac catheterization:    PHYSICAL EXAM:  Neuro: A&Ox3, NAD  Pulm: B/L BS CTA  CV: RRR,+S1S2  Abd: Soft, NT/ND +BSX4Q  Ext: B/L LE no edema, +PP, no calf tenderness    Pt has AICD/PPM [ ] Yes  [x ] No               Pre-op Beta Blocker ordered within 24 hrs of surgery (CABG ONLY)?  [x ] Yes  [ ] No  If not, Why?  Type & Cross  [X] Yes  [ ] No  NPO after Midnight [x ] Yes  [ ] No  Pre-op ABX ordered, to be taped on chart:  [ x] Yes  [ ] No     Hibiclens/Peridex ordered [x ] Yes  [ ] No  Intraop on Hold: PRBCs, CXR, GIAN [x ]   Consent obtained  [x ] Yes  [ ] No Cardiac Surgery Pre-op Note:     Surgeon: Dr. Heath    Procedure: 11/9/2020 CABG x3    HPI:  Pt is a 80y Nepali speaking Female presenting with R hip pain s/p mechanical fall 3 weeks ago. Pt interviewed with Nepali . Discussed case with daughter Shauna as well. Pt has been walking since her fall 3 weeks ago with consistent pain. Pt is able to bear weight and walks with assistive devices. Pt presented today to ED for shortness of breath and is being admitted for medical workup. Of note, patient has R TKA done several years ago but cannot remember name of surgeon. Pt is a community ambulator at baseline. Pt denies numbness, tingling, or paresthesias in affected limb. Pt denies headstrike or LOC and denies any other orthopaedic injuries at this time. Pt takes Eliquis 5mg BID for previous DVT but is unsure when her last dose was. Denies fevers, dizziness, CP, SOB, N/V, calf pain.   (03 Nov 2020 12:35)      PAST MEDICAL & SURGICAL HISTORY:  Renal insufficiency    DVT of leg (deep venous thrombosis)  right calf DVT  2/2019 pt on Eliquis    CHF (congestive heart failure)    Type II diabetes mellitus    Obese    Rectal cancer  s/p chemotherapy and  radiation 12 weeks 2/2015 -3/2015    Hyperlipidemia    Lymphedema of arm  right arm s/p mastectomy    HTN (hypertension)    Breast CA, right  1989 s/p mastectomy ,IV Chemotherapy    S/P TKR (total knee replacement), right  2017    S/P colon resection  2015    S/P ileostomy  low anterior resection-8/10/15, reversal of ileostomy 2016    History of appendectomy  1953    S/P mastectomy, right  1989        MEDICATIONS  (STANDING):  acetaminophen   Tablet .. 975 milliGRAM(s) Oral every 8 hours  amLODIPine   Tablet 5 milliGRAM(s) Oral daily  aspirin enteric coated 81 milliGRAM(s) Oral daily  atorvastatin 10 milliGRAM(s) Oral at bedtime  chlorhexidine 0.12% Liquid 30 milliLiter(s) Swish and Spit once  chlorhexidine 4% Liquid 1 Application(s) Topical once  gabapentin 300 milliGRAM(s) Oral two times a day  hydrALAZINE 100 milliGRAM(s) Oral two times a day  insulin glargine Injectable (LANTUS) 7 Unit(s) SubCutaneous at bedtime  insulin lispro (ADMELOG) corrective regimen sliding scale   SubCutaneous three times a day before meals  insulin lispro (ADMELOG) corrective regimen sliding scale   SubCutaneous at bedtime  insulin lispro Injectable (ADMELOG) 3 Unit(s) SubCutaneous before breakfast  insulin lispro Injectable (ADMELOG) 3 Unit(s) SubCutaneous before lunch  insulin lispro Injectable (ADMELOG) 3 Unit(s) SubCutaneous before dinner  isosorbide   mononitrate ER Tablet (IMDUR) 60 milliGRAM(s) Oral daily  metoprolol succinate ER 25 milliGRAM(s) Oral daily  mupirocin 2% Ointment 1 Application(s) Topical two times a day  polyethylene glycol 3350 17 Gram(s) Oral daily  senna 2 Tablet(s) Oral at bedtime  vancomycin  IVPB 1250 milliGRAM(s) IV Intermittent once      MEDICATIONS  (PRN):  dextrose 40% Gel 15 Gram(s) Oral once PRN Blood Glucose LESS THAN 70 milliGRAM(s)/deciliter  glucagon  Injectable 1 milliGRAM(s) IntraMuscular once PRN Glucose LESS THAN 70 milligrams/deciliter  magnesium hydroxide Suspension 30 milliLiter(s) Oral daily PRN Constipation  melatonin 3 milliGRAM(s) Oral at bedtime PRN Insomnia  oxyCODONE    IR 2.5 milliGRAM(s) Oral every 4 hours PRN Moderate Pain (4 - 6)  oxyCODONE    IR 5 milliGRAM(s) Oral every 4 hours PRN Severe Pain (7 - 10)  oxymetazoline 0.05% Nasal Spray 1 Spray(s) Both Nostrils once PRN Nasal Congestion  traMADol 50 milliGRAM(s) Oral every 6 hours PRN Mild Pain (1 - 3)      Vital Signs Last 24 Hrs  T(C): 36.7 (11-08-20 @ 05:13), Max: 36.7 (11-07-20 @ 12:23)  T(F): 98 (11-08-20 @ 05:13), Max: 98.1 (11-07-20 @ 12:23)  HR: 66 (11-08-20 @ 05:13) (56 - 67)  BP: 159/78 (11-08-20 @ 05:13) (138/73 - 175/82)  RR: 16 (11-08-20 @ 05:13) (16 - 18)  SpO2: 97% (11-08-20 @ 05:13) (93% - 97%)                Height (cm): 152.4   Weight (kg): 89.4   BMI (kg/m2): 38.5 (06 Nov 2020 18:04)      Labs:                        13.2   9.69  )-----------( 257      ( 08 Nov 2020 05:51 )             41.9       139  |  100  |  59<H>  ----------------------------<  163<H>  4.2   |  26  |  1.75<H>      PT/INR - ( 08 Nov 2020 05:54 )   PT: 10.9 sec;   INR: 0.90 ratio         ABO Interpretation: O (11-08 @ 04:23)      Thyroid Panel: 11-06 @ 09:59   0.21/6.8/56    MRSA/ MSSA PCR Result.: Detected (11-06 @ 09:21)      < from: Xray Chest 1 View- PORTABLE-Urgent (Xray Chest 1 View- PORTABLE-Urgent .) (11.02.20 @ 22:13) >    IMPRESSION:    Clear lungs.    PFT's: pending      < from: Transthoracic Echocardiogram (11.05.20 @ 19:36) >  Observations:  Mitral Valve: Mitral annular calcification. Mild mitral  regurgitation.  Aortic Valve/Aorta: Aortic valve not well visualized.  Aortic Root: 2.9 cm.  LVOT diameter: 1.8 cm.  Left Atrium: Severely dilated left atrium.  LA volume index  = 49 cc/m2.  Left Ventricle: Normal left ventricular systolic function.  No segmental wall motion abnormalities. Increased relative  wall thickness with normal left ventricular mass index,  consistent with concentric left ventricular remodeling.  Normal diastolic function  Right Heart: Normal right atrium. Normal right ventricular  size and function. Normal tricuspid valve. Minimal  tricuspid regurgitation. Normal pulmonic valve.  Pericardium/Pleura: Normal pericardium with no pericardial  effusion.  Hemodynamic: Estimated right atrial pressure is 8 mm Hg.  Estimated right ventricular systolic pressure equals 35 mm  Hg, assuming right atrial pressure equals 8 mm Hg,  consistent with borderline pulmonary hypertension.        < from: Cardiac Cath Lab - Adult (11.05.20 @ 15:13) >  CORONARY VESSELS: The coronary circulation is right dominant.  LM:   --  LM: Angiography showed moderate atherosclerosis.  LAD:   --  Proximal LAD: There was a diffuse 60 % stenosis.  CX:   --  Ostial circumflex: There was a 90 % stenosis.  RCA:   --  Ostial RCA: There was a 90 % stenosis.  COMPLICATIONS: There were no complications.      PHYSICAL EXAM:  Neuro: A&Ox3, NAD  Pulm: B/L BS CTA  CV: RRR,+S1S2  Abd: Soft, NT/ND +BSX4Q  Ext: B/L LE no edema, +PP, no calf tenderness      Pt has AICD/PPM [ ] Yes  [x ] No               Pre-op Beta Blocker ordered within 24 hrs of surgery (CABG ONLY)?  [x ] Yes  [ ] No  If not, Why?  Type & Cross  [X] Yes  [ ] No  NPO after Midnight [x ] Yes  [ ] No  Pre-op ABX ordered, to be taped on chart:  [ x] Yes  [ ] No     Hibiclens/Peridex ordered [x ] Yes  [ ] No  Intraop on Hold: PRBCs, CXR, GIAN [x ]   Consent obtained  [x ] Yes  [ ] No Cardiac Surgery Pre-op Note:     Surgeon: Dr. Heath    Procedure: 11/9/2020 CABG x3    HPI:  Pt is a 80y Greenlandic speaking Female presenting with R hip pain s/p mechanical fall 3 weeks ago. Pt interviewed with Greenlandic . Discussed case with daughter Shauna as well. Pt has been walking since her fall 3 weeks ago with consistent pain. Pt is able to bear weight and walks with assistive devices. Pt presented today to ED for shortness of breath and is being admitted for medical workup. Of note, patient has R TKA done several years ago but cannot remember name of surgeon. Pt is a community ambulator at baseline. Pt denies numbness, tingling, or paresthesias in affected limb. Pt denies headstrike or LOC and denies any other orthopaedic injuries at this time. Pt takes Eliquis 5mg BID for previous DVT but is unsure when her last dose was. Denies fevers, dizziness, CP, SOB, N/V, calf pain.   (03 Nov 2020 12:35)      PAST MEDICAL & SURGICAL HISTORY:  Renal insufficiency    DVT of leg (deep venous thrombosis)  right calf DVT  2/2019 pt on Eliquis    CHF (congestive heart failure)    Type II diabetes mellitus    Obese    Rectal cancer  s/p chemotherapy and  radiation 12 weeks 2/2015 -3/2015    Hyperlipidemia    Lymphedema of arm  right arm s/p mastectomy    HTN (hypertension)    Breast CA, right  1989 s/p mastectomy ,IV Chemotherapy    S/P TKR (total knee replacement), right  2017    S/P colon resection  2015    S/P ileostomy  low anterior resection-8/10/15, reversal of ileostomy 2016    History of appendectomy  1953    S/P mastectomy, right  1989        MEDICATIONS  (STANDING):  acetaminophen   Tablet .. 975 milliGRAM(s) Oral every 8 hours  amLODIPine   Tablet 5 milliGRAM(s) Oral daily  aspirin enteric coated 81 milliGRAM(s) Oral daily  atorvastatin 40 milliGRAM(s) Oral at bedtime  chlorhexidine 0.12% Liquid 30 milliLiter(s) Swish and Spit once  chlorhexidine 4% Liquid 1 Application(s) Topical once  gabapentin 300 milliGRAM(s) Oral two times a day  hydrALAZINE 100 milliGRAM(s) Oral two times a day  insulin glargine Injectable (LANTUS) 7 Unit(s) SubCutaneous at bedtime  insulin lispro (ADMELOG) corrective regimen sliding scale   SubCutaneous three times a day before meals  insulin lispro (ADMELOG) corrective regimen sliding scale   SubCutaneous at bedtime  insulin lispro Injectable (ADMELOG) 3 Unit(s) SubCutaneous before breakfast  insulin lispro Injectable (ADMELOG) 3 Unit(s) SubCutaneous before lunch  insulin lispro Injectable (ADMELOG) 3 Unit(s) SubCutaneous before dinner  isosorbide   mononitrate ER Tablet (IMDUR) 60 milliGRAM(s) Oral daily  metoprolol succinate ER 25 milliGRAM(s) Oral daily  mupirocin 2% Ointment 1 Application(s) Topical two times a day  polyethylene glycol 3350 17 Gram(s) Oral daily  senna 2 Tablet(s) Oral at bedtime  vancomycin  IVPB 1250 milliGRAM(s) IV Intermittent once      MEDICATIONS  (PRN):  dextrose 40% Gel 15 Gram(s) Oral once PRN Blood Glucose LESS THAN 70 milliGRAM(s)/deciliter  glucagon  Injectable 1 milliGRAM(s) IntraMuscular once PRN Glucose LESS THAN 70 milligrams/deciliter  magnesium hydroxide Suspension 30 milliLiter(s) Oral daily PRN Constipation  melatonin 3 milliGRAM(s) Oral at bedtime PRN Insomnia  oxyCODONE    IR 2.5 milliGRAM(s) Oral every 4 hours PRN Moderate Pain (4 - 6)  oxyCODONE    IR 5 milliGRAM(s) Oral every 4 hours PRN Severe Pain (7 - 10)  oxymetazoline 0.05% Nasal Spray 1 Spray(s) Both Nostrils once PRN Nasal Congestion  traMADol 50 milliGRAM(s) Oral every 6 hours PRN Mild Pain (1 - 3)      Vital Signs Last 24 Hrs  T(C): 36.7 (11-08-20 @ 05:13), Max: 36.7 (11-07-20 @ 12:23)  T(F): 98 (11-08-20 @ 05:13), Max: 98.1 (11-07-20 @ 12:23)  HR: 66 (11-08-20 @ 05:13) (56 - 67)  BP: 159/78 (11-08-20 @ 05:13) (138/73 - 175/82)  RR: 16 (11-08-20 @ 05:13) (16 - 18)  SpO2: 97% (11-08-20 @ 05:13) (93% - 97%)                Height (cm): 152.4   Weight (kg): 89.4   BMI (kg/m2): 38.5 (06 Nov 2020 18:04)      Labs:                        13.2   9.69  )-----------( 257      ( 08 Nov 2020 05:51 )             41.9       139  |  100  |  59<H>  ----------------------------<  163<H>  4.2   |  26  |  1.75<H>      PT/INR - ( 08 Nov 2020 05:54 )   PT: 10.9 sec;   INR: 0.90 ratio         ABO Interpretation: O (11-08 @ 04:23)      Thyroid Panel: 11-06 @ 09:59   0.21/6.8/56    MRSA/ MSSA PCR Result.: Detected (11-06 @ 09:21)      < from: Xray Chest 1 View- PORTABLE-Urgent (Xray Chest 1 View- PORTABLE-Urgent .) (11.02.20 @ 22:13) >    IMPRESSION:    Clear lungs.    PFT's: pending      < from: Transthoracic Echocardiogram (11.05.20 @ 19:36) >  Observations:  Mitral Valve: Mitral annular calcification. Mild mitral  regurgitation.  Aortic Valve/Aorta: Aortic valve not well visualized.  Aortic Root: 2.9 cm.  LVOT diameter: 1.8 cm.  Left Atrium: Severely dilated left atrium.  LA volume index  = 49 cc/m2.  Left Ventricle: Normal left ventricular systolic function.  No segmental wall motion abnormalities. Increased relative  wall thickness with normal left ventricular mass index,  consistent with concentric left ventricular remodeling.  Normal diastolic function  Right Heart: Normal right atrium. Normal right ventricular  size and function. Normal tricuspid valve. Minimal  tricuspid regurgitation. Normal pulmonic valve.  Pericardium/Pleura: Normal pericardium with no pericardial  effusion.  Hemodynamic: Estimated right atrial pressure is 8 mm Hg.  Estimated right ventricular systolic pressure equals 35 mm  Hg, assuming right atrial pressure equals 8 mm Hg,  consistent with borderline pulmonary hypertension.        < from: Cardiac Cath Lab - Adult (11.05.20 @ 15:13) >  CORONARY VESSELS: The coronary circulation is right dominant.  LM:   --  LM: Angiography showed moderate atherosclerosis.  LAD:   --  Proximal LAD: There was a diffuse 60 % stenosis.  CX:   --  Ostial circumflex: There was a 90 % stenosis.  RCA:   --  Ostial RCA: There was a 90 % stenosis.  COMPLICATIONS: There were no complications.      PHYSICAL EXAM:  Neuro: A&Ox3, NAD  Pulm: B/L BS CTA  CV: RRR,+S1S2  Abd: Soft, Obese, NT/ND +BSX4Q  Ext: B/L LE no edema, +PP, no calf tenderness      Pt has AICD/PPM [ ] Yes  [x ] No               Pre-op Beta Blocker ordered within 24 hrs of surgery (CABG ONLY)?  [x ] Yes  [ ] No  If not, Why?  Type & Cross  [X] Yes  [ ] No  NPO after Midnight [x ] Yes  [ ] No  Pre-op ABX ordered, to be taped on chart:  [ x] Yes  [ ] No     Hibiclens/Peridex ordered [x ] Yes  [ ] No  Intraop on Hold: PRBCs, CXR, GIAN [x ]   Consent obtained  [x ] Yes  [ ] No

## 2020-11-08 NOTE — PROGRESS NOTE ADULT - ASSESSMENT
Degenerative hip disease  planned for MARLA in future     CAD   abn stress test   cath shows three vessel CAD   plan for cabg tomorrow     Mod AS  stable   not visualized on repeat echo     HTN  better today   cont current meds     CKD   fu with renal     abn EKG  q waves in anterior leads consistent with old MI     Pre-Operative Cardiac Risk Stratification and Optimization  Based on current ACC/AHA guidelines, patient history and physical exam, the patient is considered to have high risk   given significant 3vcad , will postpone non cardiac surgery till safe after cabg

## 2020-11-08 NOTE — PROGRESS NOTE ADULT - ASSESSMENT
81 y/o Frisian speaking F w/ PMHx of DVT on Eliquis, HTN, HLD, DM, renal insufficiency, rectal cancer (s/p chemo/radiation and colectomy '15), breast CA (s/p chemo & R mastectomy 1989) presented to [facility] ED c/o acute SOB x 1 hour on 11/3, also c/o R hip and R wrist pain s/p mechanical fall at home 3 weeks ago. Pt was admitted and found to have significant degenerative disease of the R hip and was planned for MARLA. On pre-op cardiac workup for MARLA, pt was found to have an abnormal stress test and was referred for cardiac cath. Cath performed 11/5 showed 60% stenosis of the prox LAD and 90% stenosis of the ostial circumflex and ostial RCA.  CT surgery was consulted for CABG evaluation w/ Dr. Heath.   11/5: Pt seen and evaluated at bedside - VSS, NAD. Pt is amenable to CT surgery at this time. CT surgery pre-op workup in progress - will check pre-op labs and Echo mild MR;  D/w Dr. Heath - pt scheduled for OR Monday 11/9. Recommend initiating ASA 81 mg QD pre-op and continuation of Statin and beta blocker.   11/7 preop workup in progress; continue asa/ statin/b-blockers  mrsa nares- on bactroban  ck covid in am   [facility] 11/9 with Dr. Heath 79 y/o Nepali speaking F w/ PMHx of DVT on Eliquis, HTN, HLD, DM, renal insufficiency, rectal cancer (s/p chemo/radiation and colectomy '15), breast CA (s/p chemo & R mastectomy 1989) presented to [facility] ED c/o acute SOB x 1 hour on 11/3, also c/o R hip and R wrist pain s/p mechanical fall at home 3 weeks ago. Pt was admitted and found to have significant degenerative disease of the R hip and was planned for MARLA. On pre-op cardiac workup for MARLA, pt was found to have an abnormal stress test and was referred for cardiac cath. Cath performed 11/5 showed 60% stenosis of the prox LAD and 90% stenosis of the ostial circumflex and ostial RCA.  CT surgery was consulted for CABG evaluation w/ Dr. Heath.   11/5: Pt seen and evaluated at bedside - NICO, NAD. Pt is amenable to CT surgery at this time. CT surgery pre-op workup in progress - will check pre-op labs and Echo mild MR;  D/w Dr. Heath - pt scheduled for OR Monday 11/9. Recommend initiating ASA 81 mg QD pre-op and continuation of Statin and beta blocker.   11/7 preop workup in progress; continue asa/ statin/b-blockers, mrsa nares- on bactroban, ck covid in am, [facility] 11/9 with Dr. Heath   11/8 VSS, COVID PCR sent, lovenox dc'd. OR Mon 2nd case.

## 2020-11-08 NOTE — PROGRESS NOTE ADULT - SUBJECTIVE AND OBJECTIVE BOX
[facility]-Maribel Whiting NP- PROGRESS NOTE    Patient seen and examined. No c/o CP or SOB.       Hospital Medications:   MEDICATIONS  (STANDING):  acetaminophen   Tablet .. 975 milliGRAM(s) Oral every 8 hours  amLODIPine   Tablet 5 milliGRAM(s) Oral daily  aspirin enteric coated 81 milliGRAM(s) Oral daily  atorvastatin 40 milliGRAM(s) Oral at bedtime  chlorhexidine 0.12% Liquid 30 milliLiter(s) Swish and Spit once  chlorhexidine 4% Liquid 1 Application(s) Topical once  dextrose 5%. 1000 milliLiter(s) (50 mL/Hr) IV Continuous <Continuous>  dextrose 50% Injectable 12.5 Gram(s) IV Push once  dextrose 50% Injectable 25 Gram(s) IV Push once  dextrose 50% Injectable 25 Gram(s) IV Push once  gabapentin 300 milliGRAM(s) Oral two times a day  hydrALAZINE 100 milliGRAM(s) Oral two times a day  insulin glargine Injectable (LANTUS) 7 Unit(s) SubCutaneous at bedtime  insulin lispro (ADMELOG) corrective regimen sliding scale   SubCutaneous three times a day before meals  insulin lispro (ADMELOG) corrective regimen sliding scale   SubCutaneous at bedtime  insulin lispro Injectable (ADMELOG) 3 Unit(s) SubCutaneous before breakfast  insulin lispro Injectable (ADMELOG) 3 Unit(s) SubCutaneous before lunch  insulin lispro Injectable (ADMELOG) 3 Unit(s) SubCutaneous before dinner  isosorbide   mononitrate ER Tablet (IMDUR) 60 milliGRAM(s) Oral daily  metoprolol succinate ER 25 milliGRAM(s) Oral daily  mupirocin 2% Ointment 1 Application(s) Topical two times a day  polyethylene glycol 3350 17 Gram(s) Oral daily  senna 2 Tablet(s) Oral at bedtime  vancomycin  IVPB 1250 milliGRAM(s) IV Intermittent once      REVIEW OF SYSTEMS:  As per HPI, 8 out of 10 ROS were unremarkable    VITALS:  T(F): 98.9 (11-08-20 @ 12:49), Max: 98.9 (11-08-20 @ 12:49)  HR: 70 (11-08-20 @ 12:49)  BP: 133/73 (11-08-20 @ 12:49)  RR: 18 (11-08-20 @ 12:49)  SpO2: 96% (11-08-20 @ 12:49)  Wt(kg): --    11-07 @ 07:01  -  11-08 @ 07:00  --------------------------------------------------------  IN: 714 mL / OUT: 1050 mL / NET: -336 mL    11-08 @ 07:01  -  11-08 @ 20:10  --------------------------------------------------------  IN: 654 mL / OUT: 0 mL / NET: 654 mL        Weight (kg): 95.2 (11-08 @ 15:59)    PHYSICAL EXAM:  Constitutional: NAD  HEENT: Normocephalic  Neck: No JVD  Respiratory: CTAB, no wheezes, rales or rhonchi  Cardiovascular: S1, S2, RRR  Gastrointestinal: BS+, soft, NT/ND  Extremities: No peripheral edema  Neurological: A/O x 3, no focal deficits  Psychiatric: Normal mood, normal affect  : No CVA tenderness. No chen.   Skin: No rashes    LABS:      11-08    139  |  100  |  59<H>  ----------------------------<  163<H>  4.2   |  26  |  1.75<H>  11-07    134<L>  |  96  |  62<H>  ----------------------------<  174<H>  4.1   |  26  |  2.22<H>    Ca    9.0      08 Nov 2020 05:51                              13.2   9.69  )-----------( 257      ( 08 Nov 2020 05:51 )             41.9

## 2020-11-08 NOTE — PROGRESS NOTE ADULT - PROBLEM SELECTOR PLAN 3
renal following  avoid nephrotoxic agents  strict I &O 's and daily weight Renal following  avoid nephrotoxic agents  strict I &O 's and daily weight

## 2020-11-08 NOTE — PROGRESS NOTE ADULT - PROBLEM SELECTOR PLAN 2
diabetic diet  insulin ss   continue lantus and admelog Continue diabetic diet/ FS AC/HS  Continue Lantus 7 HS and Admelog 3 TID, insulin ss

## 2020-11-08 NOTE — PROGRESS NOTE ADULT - SUBJECTIVE AND OBJECTIVE BOX
Subjective: Patient seen and examined. No new events except as noted.     SUBJECTIVE/ROS:  feels ok       MEDICATIONS:  MEDICATIONS  (STANDING):  acetaminophen   Tablet .. 975 milliGRAM(s) Oral every 8 hours  amLODIPine   Tablet 5 milliGRAM(s) Oral daily  aspirin enteric coated 81 milliGRAM(s) Oral daily  atorvastatin 10 milliGRAM(s) Oral at bedtime  chlorhexidine 0.12% Liquid 30 milliLiter(s) Swish and Spit once  chlorhexidine 4% Liquid 1 Application(s) Topical once  dextrose 5%. 1000 milliLiter(s) (50 mL/Hr) IV Continuous <Continuous>  dextrose 50% Injectable 12.5 Gram(s) IV Push once  dextrose 50% Injectable 25 Gram(s) IV Push once  dextrose 50% Injectable 25 Gram(s) IV Push once  enoxaparin Injectable 30 milliGRAM(s) SubCutaneous daily  gabapentin 300 milliGRAM(s) Oral two times a day  hydrALAZINE 100 milliGRAM(s) Oral two times a day  insulin glargine Injectable (LANTUS) 7 Unit(s) SubCutaneous at bedtime  insulin lispro (ADMELOG) corrective regimen sliding scale   SubCutaneous three times a day before meals  insulin lispro (ADMELOG) corrective regimen sliding scale   SubCutaneous at bedtime  insulin lispro Injectable (ADMELOG) 3 Unit(s) SubCutaneous before breakfast  insulin lispro Injectable (ADMELOG) 3 Unit(s) SubCutaneous before lunch  insulin lispro Injectable (ADMELOG) 3 Unit(s) SubCutaneous before dinner  isosorbide   mononitrate ER Tablet (IMDUR) 60 milliGRAM(s) Oral daily  metoprolol succinate ER 25 milliGRAM(s) Oral daily  mupirocin 2% Ointment 1 Application(s) Topical two times a day  polyethylene glycol 3350 17 Gram(s) Oral daily  senna 2 Tablet(s) Oral at bedtime  vancomycin  IVPB 1250 milliGRAM(s) IV Intermittent once      PHYSICAL EXAM:  T(C): 36.7 (11-08-20 @ 05:13), Max: 36.7 (11-07-20 @ 12:23)  HR: 66 (11-08-20 @ 05:13) (56 - 67)  BP: 159/78 (11-08-20 @ 05:13) (138/73 - 175/82)  RR: 16 (11-08-20 @ 05:13) (16 - 18)  SpO2: 97% (11-08-20 @ 05:13) (93% - 97%)  Wt(kg): --  I&O's Summary    07 Nov 2020 07:01  -  08 Nov 2020 07:00  --------------------------------------------------------  IN: 714 mL / OUT: 1050 mL / NET: -336 mL            JVP: Normal  Neck: supple  Lung: clear   CV: S1 S2 , Murmur:  Abd: soft  Ext: No edema  neuro: Awake / alert  Psych: flat affect  Skin: normal``    LABS/DATA:    CARDIAC MARKERS:                                13.2   9.69  )-----------( 257      ( 08 Nov 2020 05:51 )             41.9     11-08    139  |  100  |  59<H>  ----------------------------<  163<H>  4.2   |  26  |  1.75<H>    Ca    9.0      08 Nov 2020 05:51      proBNP:   Lipid Profile:   HgA1c:   TSH:     TELE:  EKG:

## 2020-11-09 ENCOUNTER — APPOINTMENT (OUTPATIENT)
Dept: ORTHOPEDIC SURGERY | Facility: HOSPITAL | Age: 80
End: 2020-11-09

## 2020-11-09 ENCOUNTER — APPOINTMENT (OUTPATIENT)
Dept: CARDIOTHORACIC SURGERY | Facility: HOSPITAL | Age: 80
End: 2020-11-09

## 2020-11-09 LAB
ALBUMIN SERPL ELPH-MCNC: 3 G/DL — LOW (ref 3.3–5)
ALP SERPL-CCNC: 40 U/L — SIGNIFICANT CHANGE UP (ref 40–120)
ALT FLD-CCNC: 14 U/L — SIGNIFICANT CHANGE UP (ref 10–45)
ANION GAP SERPL CALC-SCNC: 9 MMOL/L — SIGNIFICANT CHANGE UP (ref 5–17)
APTT BLD: 26.2 SEC — LOW (ref 27.5–35.5)
AST SERPL-CCNC: 31 U/L — SIGNIFICANT CHANGE UP (ref 10–40)
BASE EXCESS BLDV CALC-SCNC: 2.3 MMOL/L — HIGH (ref -2–2)
BASE EXCESS BLDV CALC-SCNC: 3.6 MMOL/L — HIGH (ref -2–2)
BASE EXCESS BLDV CALC-SCNC: 4.9 MMOL/L — HIGH (ref -2–2)
BASOPHILS # BLD AUTO: 0.03 K/UL — SIGNIFICANT CHANGE UP (ref 0–0.2)
BASOPHILS NFR BLD AUTO: 0.2 % — SIGNIFICANT CHANGE UP (ref 0–2)
BILIRUB SERPL-MCNC: 0.6 MG/DL — SIGNIFICANT CHANGE UP (ref 0.2–1.2)
BUN SERPL-MCNC: 37 MG/DL — HIGH (ref 7–23)
CA-I SERPL-SCNC: 1 MMOL/L — LOW (ref 1.12–1.3)
CA-I SERPL-SCNC: 1.05 MMOL/L — LOW (ref 1.12–1.3)
CA-I SERPL-SCNC: 1.11 MMOL/L — LOW (ref 1.12–1.3)
CALCIUM SERPL-MCNC: 8.3 MG/DL — LOW (ref 8.4–10.5)
CHLORIDE BLDV-SCNC: 102 MMOL/L — SIGNIFICANT CHANGE UP (ref 96–108)
CHLORIDE BLDV-SCNC: 104 MMOL/L — SIGNIFICANT CHANGE UP (ref 96–108)
CHLORIDE BLDV-SCNC: 99 MMOL/L — SIGNIFICANT CHANGE UP (ref 96–108)
CHLORIDE SERPL-SCNC: 104 MMOL/L — SIGNIFICANT CHANGE UP (ref 96–108)
CK MB BLD-MCNC: 13.9 % — HIGH (ref 0–3.5)
CK MB CFR SERPL CALC: 30.7 NG/ML — HIGH (ref 0–3.8)
CK SERPL-CCNC: 221 U/L — HIGH (ref 25–170)
CO2 BLDV-SCNC: 28 MMOL/L — SIGNIFICANT CHANGE UP (ref 22–30)
CO2 BLDV-SCNC: 29 MMOL/L — SIGNIFICANT CHANGE UP (ref 22–30)
CO2 BLDV-SCNC: 30 MMOL/L — SIGNIFICANT CHANGE UP (ref 22–30)
CO2 SERPL-SCNC: 27 MMOL/L — SIGNIFICANT CHANGE UP (ref 22–31)
CREAT SERPL-MCNC: 1.25 MG/DL — SIGNIFICANT CHANGE UP (ref 0.5–1.3)
EOSINOPHIL # BLD AUTO: 0.09 K/UL — SIGNIFICANT CHANGE UP (ref 0–0.5)
EOSINOPHIL NFR BLD AUTO: 0.5 % — SIGNIFICANT CHANGE UP (ref 0–6)
FIBRINOGEN PPP-MCNC: 320 MG/DL — SIGNIFICANT CHANGE UP (ref 290–520)
GAS PNL BLDA: SIGNIFICANT CHANGE UP
GAS PNL BLDV: 137 MMOL/L — SIGNIFICANT CHANGE UP (ref 135–145)
GAS PNL BLDV: SIGNIFICANT CHANGE UP
GLUCOSE BLDC GLUCOMTR-MCNC: 169 MG/DL — HIGH (ref 70–99)
GLUCOSE BLDC GLUCOMTR-MCNC: 192 MG/DL — HIGH (ref 70–99)
GLUCOSE BLDC GLUCOMTR-MCNC: 194 MG/DL — HIGH (ref 70–99)
GLUCOSE BLDC GLUCOMTR-MCNC: 260 MG/DL — HIGH (ref 70–99)
GLUCOSE BLDV-MCNC: 138 MG/DL — HIGH (ref 70–99)
GLUCOSE BLDV-MCNC: 160 MG/DL — HIGH (ref 70–99)
GLUCOSE BLDV-MCNC: 167 MG/DL — HIGH (ref 70–99)
GLUCOSE SERPL-MCNC: 139 MG/DL — HIGH (ref 70–99)
HCO3 BLDV-SCNC: 27 MMOL/L — SIGNIFICANT CHANGE UP (ref 21–29)
HCO3 BLDV-SCNC: 28 MMOL/L — SIGNIFICANT CHANGE UP (ref 21–29)
HCO3 BLDV-SCNC: 29 MMOL/L — SIGNIFICANT CHANGE UP (ref 21–29)
HCT VFR BLD CALC: 34 % — LOW (ref 34.5–45)
HCT VFR BLDA CALC: 24 % — LOW (ref 39–50)
HCT VFR BLDA CALC: 26 % — LOW (ref 39–50)
HCT VFR BLDA CALC: 35 % — LOW (ref 39–50)
HEPARINASE TEG R TIME: 9.8 MIN (ref 4.3–8.3)
HGB BLD CALC-MCNC: 11.3 G/DL — LOW (ref 11.5–15.5)
HGB BLD CALC-MCNC: 7.8 G/DL — LOW (ref 11.5–15.5)
HGB BLD CALC-MCNC: 8.5 G/DL — LOW (ref 11.5–15.5)
HGB BLD-MCNC: 10.6 G/DL — LOW (ref 11.5–15.5)
HOROWITZ INDEX BLDV+IHG-RTO: 100 — SIGNIFICANT CHANGE UP
HOROWITZ INDEX BLDV+IHG-RTO: 100 — SIGNIFICANT CHANGE UP
HOROWITZ INDEX BLDV+IHG-RTO: 50 — SIGNIFICANT CHANGE UP
IMM GRANULOCYTES NFR BLD AUTO: 1 % — SIGNIFICANT CHANGE UP (ref 0–1.5)
INR BLD: 1.16 RATIO — SIGNIFICANT CHANGE UP (ref 0.88–1.16)
LACTATE BLDV-MCNC: 0.9 MMOL/L — SIGNIFICANT CHANGE UP (ref 0.7–2)
LACTATE BLDV-MCNC: 1.5 MMOL/L — SIGNIFICANT CHANGE UP (ref 0.7–2)
LACTATE BLDV-MCNC: 1.5 MMOL/L — SIGNIFICANT CHANGE UP (ref 0.7–2)
LYMPHOCYTES # BLD AUTO: 1 K/UL — SIGNIFICANT CHANGE UP (ref 1–3.3)
LYMPHOCYTES # BLD AUTO: 5.7 % — LOW (ref 13–44)
MAGNESIUM SERPL-MCNC: 3 MG/DL — HIGH (ref 1.6–2.6)
MCHC RBC-ENTMCNC: 29.4 PG — SIGNIFICANT CHANGE UP (ref 27–34)
MCHC RBC-ENTMCNC: 31.2 GM/DL — LOW (ref 32–36)
MCV RBC AUTO: 94.4 FL — SIGNIFICANT CHANGE UP (ref 80–100)
MONOCYTES # BLD AUTO: 0.94 K/UL — HIGH (ref 0–0.9)
MONOCYTES NFR BLD AUTO: 5.3 % — SIGNIFICANT CHANGE UP (ref 2–14)
NEUTROPHILS # BLD AUTO: 15.39 K/UL — HIGH (ref 1.8–7.4)
NEUTROPHILS NFR BLD AUTO: 87.3 % — HIGH (ref 43–77)
NRBC # BLD: 0 /100 WBCS — SIGNIFICANT CHANGE UP (ref 0–0)
OTHER CELLS CSF MANUAL: 10 ML/DL — LOW (ref 18–22)
OTHER CELLS CSF MANUAL: 11 ML/DL — LOW (ref 18–22)
OTHER CELLS CSF MANUAL: 11 ML/DL — LOW (ref 18–22)
PCO2 BLDV: 43 MMHG — SIGNIFICANT CHANGE UP (ref 35–50)
PCO2 BLDV: 43 MMHG — SIGNIFICANT CHANGE UP (ref 35–50)
PCO2 BLDV: 46 MMHG — SIGNIFICANT CHANGE UP (ref 35–50)
PH BLDV: 7.39 — SIGNIFICANT CHANGE UP (ref 7.35–7.45)
PH BLDV: 7.43 — SIGNIFICANT CHANGE UP (ref 7.35–7.45)
PH BLDV: 7.45 — SIGNIFICANT CHANGE UP (ref 7.35–7.45)
PHOSPHATE SERPL-MCNC: 2.3 MG/DL — LOW (ref 2.5–4.5)
PLATELET # BLD AUTO: 164 K/UL — SIGNIFICANT CHANGE UP (ref 150–400)
PO2 BLDV: 36 MMHG — SIGNIFICANT CHANGE UP (ref 25–45)
PO2 BLDV: 64 MMHG — HIGH (ref 25–45)
PO2 BLDV: 91 MMHG — HIGH (ref 25–45)
POTASSIUM BLDV-SCNC: 3.5 MMOL/L — SIGNIFICANT CHANGE UP (ref 3.5–5.3)
POTASSIUM BLDV-SCNC: 3.9 MMOL/L — SIGNIFICANT CHANGE UP (ref 3.5–5.3)
POTASSIUM BLDV-SCNC: 4 MMOL/L — SIGNIFICANT CHANGE UP (ref 3.5–5.3)
POTASSIUM SERPL-MCNC: 4.3 MMOL/L — SIGNIFICANT CHANGE UP (ref 3.5–5.3)
POTASSIUM SERPL-SCNC: 4.3 MMOL/L — SIGNIFICANT CHANGE UP (ref 3.5–5.3)
PROT SERPL-MCNC: 4.5 G/DL — LOW (ref 6–8.3)
PROTHROM AB SERPL-ACNC: 13.8 SEC — HIGH (ref 10.6–13.6)
RAPIDTEG MAXIMUM AMPLITUDE: 57.9 MM — SIGNIFICANT CHANGE UP (ref 52–70)
RBC # BLD: 3.6 M/UL — LOW (ref 3.8–5.2)
RBC # FLD: 15.3 % — HIGH (ref 10.3–14.5)
SAO2 % BLDV: 65 % — LOW (ref 67–88)
SAO2 % BLDV: 94 % — HIGH (ref 67–88)
SAO2 % BLDV: 98 % — HIGH (ref 67–88)
SODIUM SERPL-SCNC: 140 MMOL/L — SIGNIFICANT CHANGE UP (ref 135–145)
TEG FUNCTIONAL FIBRINOGEN: 18.7 MM — SIGNIFICANT CHANGE UP (ref 15–32)
TEG MAXIMUM AMPLITUDE: 59.9 MM — SIGNIFICANT CHANGE UP (ref 52–69)
TEG REACTION TIME: 9.9 MIN (ref 4.6–9.1)
TROPONIN T, HIGH SENSITIVITY RESULT: 310 NG/L — HIGH (ref 0–51)
WBC # BLD: 17.62 K/UL — HIGH (ref 3.8–10.5)
WBC # FLD AUTO: 17.62 K/UL — HIGH (ref 3.8–10.5)

## 2020-11-09 PROCEDURE — 33517 CABG ARTERY-VEIN SINGLE: CPT | Mod: AS

## 2020-11-09 PROCEDURE — 33517 CABG ARTERY-VEIN SINGLE: CPT

## 2020-11-09 PROCEDURE — 93010 ELECTROCARDIOGRAM REPORT: CPT

## 2020-11-09 PROCEDURE — 33533 CABG ARTERIAL SINGLE: CPT | Mod: AS

## 2020-11-09 PROCEDURE — 71045 X-RAY EXAM CHEST 1 VIEW: CPT | Mod: 26

## 2020-11-09 PROCEDURE — 33508 ENDOSCOPIC VEIN HARVEST: CPT

## 2020-11-09 PROCEDURE — 33508 ENDOSCOPIC VEIN HARVEST: CPT | Mod: AS

## 2020-11-09 PROCEDURE — 33533 CABG ARTERIAL SINGLE: CPT

## 2020-11-09 RX ORDER — FAMOTIDINE 10 MG/ML
20 INJECTION INTRAVENOUS EVERY 12 HOURS
Refills: 0 | Status: DISCONTINUED | OUTPATIENT
Start: 2020-11-09 | End: 2020-11-17

## 2020-11-09 RX ORDER — SODIUM CHLORIDE 9 MG/ML
250 INJECTION INTRAMUSCULAR; INTRAVENOUS; SUBCUTANEOUS ONCE
Refills: 0 | Status: COMPLETED | OUTPATIENT
Start: 2020-11-09 | End: 2020-11-09

## 2020-11-09 RX ORDER — POTASSIUM CHLORIDE 20 MEQ
10 PACKET (EA) ORAL
Refills: 0 | Status: DISCONTINUED | OUTPATIENT
Start: 2020-11-09 | End: 2020-11-10

## 2020-11-09 RX ORDER — INSULIN HUMAN 100 [IU]/ML
3 INJECTION, SOLUTION SUBCUTANEOUS
Qty: 100 | Refills: 0 | Status: DISCONTINUED | OUTPATIENT
Start: 2020-11-09 | End: 2020-11-11

## 2020-11-09 RX ORDER — CHLORHEXIDINE GLUCONATE 213 G/1000ML
15 SOLUTION TOPICAL EVERY 12 HOURS
Refills: 0 | Status: DISCONTINUED | OUTPATIENT
Start: 2020-11-09 | End: 2020-11-10

## 2020-11-09 RX ORDER — ASPIRIN/CALCIUM CARB/MAGNESIUM 324 MG
81 TABLET ORAL DAILY
Refills: 0 | Status: DISCONTINUED | OUTPATIENT
Start: 2020-11-09 | End: 2020-11-17

## 2020-11-09 RX ORDER — SODIUM CHLORIDE 9 MG/ML
500 INJECTION INTRAMUSCULAR; INTRAVENOUS; SUBCUTANEOUS ONCE
Refills: 0 | Status: COMPLETED | OUTPATIENT
Start: 2020-11-09 | End: 2020-11-09

## 2020-11-09 RX ORDER — MUPIROCIN 20 MG/G
1 OINTMENT TOPICAL
Refills: 0 | Status: COMPLETED | OUTPATIENT
Start: 2020-11-09 | End: 2020-11-14

## 2020-11-09 RX ORDER — ACETAMINOPHEN 500 MG
650 TABLET ORAL EVERY 6 HOURS
Refills: 0 | Status: DISCONTINUED | OUTPATIENT
Start: 2020-11-09 | End: 2020-11-17

## 2020-11-09 RX ORDER — OXYCODONE HYDROCHLORIDE 5 MG/1
5 TABLET ORAL EVERY 6 HOURS
Refills: 0 | Status: DISCONTINUED | OUTPATIENT
Start: 2020-11-09 | End: 2020-11-16

## 2020-11-09 RX ORDER — SODIUM CHLORIDE 9 MG/ML
1000 INJECTION INTRAMUSCULAR; INTRAVENOUS; SUBCUTANEOUS
Refills: 0 | Status: DISCONTINUED | OUTPATIENT
Start: 2020-11-09 | End: 2020-11-17

## 2020-11-09 RX ORDER — DEXTROSE 50 % IN WATER 50 %
50 SYRINGE (ML) INTRAVENOUS
Refills: 0 | Status: DISCONTINUED | OUTPATIENT
Start: 2020-11-09 | End: 2020-11-10

## 2020-11-09 RX ORDER — DEXMEDETOMIDINE HYDROCHLORIDE IN 0.9% SODIUM CHLORIDE 4 UG/ML
0.2 INJECTION INTRAVENOUS
Qty: 200 | Refills: 0 | Status: DISCONTINUED | OUTPATIENT
Start: 2020-11-09 | End: 2020-11-10

## 2020-11-09 RX ORDER — POTASSIUM CHLORIDE 20 MEQ
10 PACKET (EA) ORAL ONCE
Refills: 0 | Status: COMPLETED | OUTPATIENT
Start: 2020-11-09 | End: 2020-11-09

## 2020-11-09 RX ORDER — NICARDIPINE HYDROCHLORIDE 30 MG/1
5 CAPSULE, EXTENDED RELEASE ORAL
Qty: 40 | Refills: 0 | Status: DISCONTINUED | OUTPATIENT
Start: 2020-11-09 | End: 2020-11-10

## 2020-11-09 RX ORDER — NOREPINEPHRINE BITARTRATE/D5W 8 MG/250ML
0.05 PLASTIC BAG, INJECTION (ML) INTRAVENOUS
Qty: 8 | Refills: 0 | Status: DISCONTINUED | OUTPATIENT
Start: 2020-11-09 | End: 2020-11-10

## 2020-11-09 RX ORDER — VANCOMYCIN HCL 1 G
1000 VIAL (EA) INTRAVENOUS ONCE
Refills: 0 | Status: COMPLETED | OUTPATIENT
Start: 2020-11-10 | End: 2020-11-10

## 2020-11-09 RX ORDER — ASPIRIN/CALCIUM CARB/MAGNESIUM 324 MG
300 TABLET ORAL ONCE
Refills: 0 | Status: DISCONTINUED | OUTPATIENT
Start: 2020-11-09 | End: 2020-11-15

## 2020-11-09 RX ORDER — ACETAMINOPHEN 500 MG
1000 TABLET ORAL ONCE
Refills: 0 | Status: COMPLETED | OUTPATIENT
Start: 2020-11-09 | End: 2020-11-09

## 2020-11-09 RX ORDER — CHLORHEXIDINE GLUCONATE 213 G/1000ML
1 SOLUTION TOPICAL
Refills: 0 | Status: DISCONTINUED | OUTPATIENT
Start: 2020-11-09 | End: 2020-11-17

## 2020-11-09 RX ORDER — CEFUROXIME AXETIL 250 MG
1500 TABLET ORAL EVERY 12 HOURS
Refills: 0 | Status: COMPLETED | OUTPATIENT
Start: 2020-11-10 | End: 2020-11-11

## 2020-11-09 RX ORDER — POLYETHYLENE GLYCOL 3350 17 G/17G
17 POWDER, FOR SOLUTION ORAL DAILY
Refills: 0 | Status: DISCONTINUED | OUTPATIENT
Start: 2020-11-09 | End: 2020-11-17

## 2020-11-09 RX ORDER — DEXTROSE 50 % IN WATER 50 %
25 SYRINGE (ML) INTRAVENOUS
Refills: 0 | Status: DISCONTINUED | OUTPATIENT
Start: 2020-11-09 | End: 2020-11-10

## 2020-11-09 RX ADMIN — GABAPENTIN 300 MILLIGRAM(S): 400 CAPSULE ORAL at 05:15

## 2020-11-09 RX ADMIN — CHLORHEXIDINE GLUCONATE 1 APPLICATION(S): 213 SOLUTION TOPICAL at 08:07

## 2020-11-09 RX ADMIN — OXYCODONE HYDROCHLORIDE 5 MILLIGRAM(S): 5 TABLET ORAL at 08:11

## 2020-11-09 RX ADMIN — SODIUM CHLORIDE 1000 MILLILITER(S): 9 INJECTION INTRAMUSCULAR; INTRAVENOUS; SUBCUTANEOUS at 21:05

## 2020-11-09 RX ADMIN — Medication 1: at 12:42

## 2020-11-09 RX ADMIN — Medication 1: at 08:07

## 2020-11-09 RX ADMIN — OXYCODONE HYDROCHLORIDE 5 MILLIGRAM(S): 5 TABLET ORAL at 09:00

## 2020-11-09 RX ADMIN — Medication 25 MILLIGRAM(S): at 05:15

## 2020-11-09 RX ADMIN — Medication 400 MILLIGRAM(S): at 22:45

## 2020-11-09 RX ADMIN — SODIUM CHLORIDE 2000 MILLILITER(S): 9 INJECTION INTRAMUSCULAR; INTRAVENOUS; SUBCUTANEOUS at 20:05

## 2020-11-09 RX ADMIN — Medication 50 MILLIEQUIVALENT(S): at 23:15

## 2020-11-09 RX ADMIN — Medication 100 MILLIGRAM(S): at 05:15

## 2020-11-09 RX ADMIN — MUPIROCIN 1 APPLICATION(S): 20 OINTMENT TOPICAL at 22:50

## 2020-11-09 RX ADMIN — MUPIROCIN 1 APPLICATION(S): 20 OINTMENT TOPICAL at 05:19

## 2020-11-09 RX ADMIN — Medication 975 MILLIGRAM(S): at 05:15

## 2020-11-09 RX ADMIN — SODIUM CHLORIDE 2000 MILLILITER(S): 9 INJECTION INTRAMUSCULAR; INTRAVENOUS; SUBCUTANEOUS at 22:50

## 2020-11-09 RX ADMIN — AMLODIPINE BESYLATE 5 MILLIGRAM(S): 2.5 TABLET ORAL at 05:15

## 2020-11-09 RX ADMIN — Medication 1000 MILLIGRAM(S): at 23:15

## 2020-11-09 NOTE — PRE-ANESTHESIA EVALUATION ADULT - NSANTHPMHFT_GEN_ALL_CORE
79yo Welsh speaking woman with hx HTN, HLD, DM2, CKD, DVT on AC, rectal CA, breast CA admitted s/p fall with severe right hip osteoarthritis, found to have severe CAD on medical workup.

## 2020-11-09 NOTE — BRIEF OPERATIVE NOTE - COMMENTS
CHRISTINE Lacey first assisted for the entire case including sternotomy, cannulation, creation of bypass grafting and closure.

## 2020-11-09 NOTE — PROGRESS NOTE ADULT - SUBJECTIVE AND OBJECTIVE BOX
NURIA GARCIA  MRN-[id number]  Patient is a 80y old  Female who presents with a chief complaint of Right Hip Pain difficulty ambulating (04 Nov 2020 06:17)    HPI:  Pt is a 80y Nauruan speaking Female presenting with R hip pain s/p mechanical fall 3 weeks ago. Pt interviewed with Nauruan . Discussed case with daughter Shauna as well. Pt has been walking since her fall 3 weeks ago with consistent pain. Pt is able to bear weight and walks with assistive devices. Pt presented today to ED for shortness of breath and is being admitted for medical workup. Of note, patient has R TKA done several years ago but cannot remember name of surgeon. Pt is a community ambulator at baseline. Pt denies numbness, tingling, or paresthesias in affected limb. Pt denies headstrike or LOC and denies any other orthopaedic injuries at this time. Pt takes Eliquis 5mg BID for previous DVT but is unsure when her last dose was. Denies fevers, dizziness, CP, SOB, N/V, calf pain. (03 Nov 2020 12:35)      Surgery/Hospital course:  11/9 CABG     Today:  S/p CABG     REVIEW OF SYSTEMS:  Unable to obtain, pt intubated     Physical Exam:  Vital Signs Last 24 Hrs  T(C): 35.3 (09 Nov 2020 19:27), Max: 36.8 (09 Nov 2020 04:55)  T(F): 95.5 (09 Nov 2020 19:27), Max: 98.3 (09 Nov 2020 04:55)  HR: 96 (09 Nov 2020 21:45) (60 - 96)  BP: 148/63 (09 Nov 2020 14:38) (148/63 - 184/78)  BP(mean): --  RR: 16 (09 Nov 2020 21:45) (12 - 17)  SpO2: 97% (09 Nov 2020 21:45) (96% - 100%)  Gen:  intubated   CNS: non focal 	  Neck: no JVD  RES : clear , no wheezing    Chest:   + chest tubes                     CVS: Regular  rhythm. Normal S1/S2  Abd: Soft, non-distended. Bowel sounds present.  Skin: No rash.  Ext:  no edema, A Line  PSY:  ============================I/O===========================   I&O's Detail    08 Nov 2020 07:01  -  09 Nov 2020 07:00  --------------------------------------------------------  IN:    Oral Fluid: 654 mL  Total IN: 654 mL    OUT:    Voided (mL): 1300 mL  Total OUT: 1300 mL    Total NET: -646 mL      09 Nov 2020 07:01  -  09 Nov 2020 22:00  --------------------------------------------------------  IN:    Insulin: 7 mL    NiCARdipine: 15 mL    Norepinephrine: 8 mL    sodium chloride 0.9%: 40 mL    Sodium Chloride 0.9% Bolus: 750 mL  Total IN: 820 mL    OUT:    Chest Tube (mL): 40 mL    Chest Tube (mL): 30 mL    Oral Fluid: 0 mL    Ureteral Catheter (mL): 515 mL  Total OUT: 585 mL    Total NET: 235 mL        ============================ LABS =========================                        10.6   17.62 )-----------( 164      ( 09 Nov 2020 19:58 )             34.0     11-09    140  |  104  |  37<H>  ----------------------------<  139<H>  4.3   |  27  |  1.25    Ca    8.3<L>      09 Nov 2020 19:58  Phos  2.3     11-09  Mg     3.0     11-09    TPro  4.5<L>  /  Alb  3.0<L>  /  TBili  0.6  /  DBili  x   /  AST  31  /  ALT  14  /  AlkPhos  40  11-09    LIVER FUNCTIONS - ( 09 Nov 2020 19:58 )  Alb: 3.0 g/dL / Pro: 4.5 g/dL / ALK PHOS: 40 U/L / ALT: 14 U/L / AST: 31 U/L / GGT: x           PT/INR - ( 09 Nov 2020 19:58 )   PT: 13.8 sec;   INR: 1.16 ratio         PTT - ( 09 Nov 2020 19:58 )  PTT:26.2 sec  ABG - ( 09 Nov 2020 20:57 )  pH, Arterial: 7.48  pH, Blood: x     /  pCO2: 36    /  pO2: 91    / HCO3: 26    / Base Excess: 3.5   /  SaO2: 98                  ======================Micro/Rad/Cardio=================  CXR: Reviewed  Echo:Reviewed  ======================================================  PAST MEDICAL & SURGICAL HISTORY:  Renal insufficiency    DVT of leg (deep venous thrombosis)  right calf DVT  2/2019 pt on Eliquis    CHF (congestive heart failure)    Type II diabetes mellitus    Obese    Rectal cancer  s/p chemotherapy and  radiation 12 weeks 2/2015 -3/2015    Hyperlipidemia    Lymphedema of arm  right arm s/p mastectomy    HTN (hypertension)    Breast CA, right  1989 s/p mastectomy ,IV Chemotherapy    S/P TKR (total knee replacement), right  2017    S/P colon resection  2015    S/P ileostomy  low anterior resection-8/10/15, reversal of ileostomy 2016    History of appendectomy  1953    S/P mastectomy, right  1989      ====================ASSESSMENT ==============  S/p CABG   CAD/ASHD   CHF (congestive heart failure)  Hyperlipidemia  Type II diabetes mellitus  Obesity [facility] / [facility]     Plan:  ====================== NEUROLOGY=====================  Tylenol prn for fevers   Oxycodone prn for analgesia     acetaminophen   Tablet .. 650 milliGRAM(s) Oral every 6 hours PRN Temp greater or equal to 38.5C (101.3F), Mild Pain (1 - 3)  oxyCODONE    IR 5 milliGRAM(s) Oral every 6 hours PRN Moderate Pain (4 - 6)    ==================== RESPIRATORY======================  Respiratory status required full ventilatory support, close monitoring of respiratory rate and breathing pattern, the following of ABG’s with A-line monitoring, continuous pulse oximetry monitoring    Mechanical Ventilation:  Mode: AC/ CMV (Assist Control/ Continuous Mandatory Ventilation)  RR (machine): 12  TV (machine): 500  FiO2: 50  PEEP: 5  ITime: 1  MAP: 11  PIP: 34      ====================CARDIOVASCULAR==================  CAD s/p CABG   Continue invasive hemodynamic monitoring   Blood pressure support with IV Cardene   On pressor support with IV Levophed   ASA for CAD     aspirin enteric coated 81 milliGRAM(s) Oral daily  niCARdipine Infusion 5 mG/Hr (25 mL/Hr) IV Continuous <Continuous>  norepinephrine Infusion 0.05 MICROgram(s)/kG/Min (8.38 mL/Hr) IV Continuous <Continuous>    ===================HEMATOLOGIC/ONC ===================  Monitor H&H/Plts     ===================== RENAL =========================  Monitor BUN/Cr and I&Os  [place] for monitoring urine output    ==================== GASTROINTESTINAL===================  NPO, advance diet when appropriate   Bowel regimen with Miralax     GI prophylaxis, famotidine Injectable 20 milliGRAM(s) IV Push every 12 hours  polyethylene glycol 3350 17 Gram(s) Oral daily  potassium chloride  10 mEq/50 mL IVPB 10 milliEquivalent(s) IV Intermittent every 1 hour  sodium chloride 0.9%. 1000 milliLiter(s) (10 mL/Hr) IV Continuous <Continuous>    =======================    ENDOCRINE  =====================  Glucose control with IV insulin for stress hyperglycemia     dextrose 50% Injectable 50 milliLiter(s) IV Push every 15 minutes  dextrose 50% Injectable 25 milliLiter(s) IV Push every 15 minutes  insulin regular Infusion 3 Unit(s)/Hr (3 mL/Hr) IV Continuous <Continuous>    ========================INFECTIOUS DISEASE================  Afebrile, WBC rising 9.69->17.62   Continue monitoring WBC and fever curve       .crit    By signing my name below, I, Becky Christian, attest that this documentation has been prepared under the direction and in the presence of Kelby Kim MD.  Electronically signed: Miguel Angle Martinez, 11-09-20 @ 22:00    I, Kelby Kim, personally performed the services described in this documentation. all medical record entries made by the miguel angel were at my direction and in my presence. I have reviewed the chart and agree that the record reflects my personal performance and is accurate and complete  Electronically signed: Kelby Kim, 11-09-20 @ 22:00

## 2020-11-09 NOTE — PROGRESS NOTE ADULT - SUBJECTIVE AND OBJECTIVE BOX
Subjective: Patient seen and examined. No new events except as noted.     SUBJECTIVE/ROS:  feels ok       MEDICATIONS:  MEDICATIONS  (STANDING):  acetaminophen   Tablet .. 975 milliGRAM(s) Oral every 8 hours  amLODIPine   Tablet 5 milliGRAM(s) Oral daily  aspirin enteric coated 81 milliGRAM(s) Oral daily  atorvastatin 40 milliGRAM(s) Oral at bedtime  chlorhexidine 0.12% Liquid 30 milliLiter(s) Swish and Spit once  chlorhexidine 4% Liquid 1 Application(s) Topical once  dextrose 5%. 1000 milliLiter(s) (50 mL/Hr) IV Continuous <Continuous>  dextrose 50% Injectable 12.5 Gram(s) IV Push once  dextrose 50% Injectable 25 Gram(s) IV Push once  dextrose 50% Injectable 25 Gram(s) IV Push once  gabapentin 300 milliGRAM(s) Oral two times a day  hydrALAZINE 100 milliGRAM(s) Oral two times a day  insulin glargine Injectable (LANTUS) 7 Unit(s) SubCutaneous at bedtime  insulin lispro (ADMELOG) corrective regimen sliding scale   SubCutaneous three times a day before meals  insulin lispro (ADMELOG) corrective regimen sliding scale   SubCutaneous at bedtime  insulin lispro Injectable (ADMELOG) 3 Unit(s) SubCutaneous before breakfast  insulin lispro Injectable (ADMELOG) 3 Unit(s) SubCutaneous before lunch  insulin lispro Injectable (ADMELOG) 3 Unit(s) SubCutaneous before dinner  isosorbide   mononitrate ER Tablet (IMDUR) 60 milliGRAM(s) Oral daily  metoprolol succinate ER 25 milliGRAM(s) Oral daily  mupirocin 2% Ointment 1 Application(s) Topical two times a day  polyethylene glycol 3350 17 Gram(s) Oral daily  senna 2 Tablet(s) Oral at bedtime  vancomycin  IVPB 1250 milliGRAM(s) IV Intermittent once      PHYSICAL EXAM:  T(C): 36.8 (11-09-20 @ 04:55), Max: 37.2 (11-08-20 @ 12:49)  HR: 63 (11-09-20 @ 04:55) (63 - 70)  BP: 184/78 (11-09-20 @ 04:55) (126/65 - 184/78)  RR: 16 (11-09-20 @ 04:55) (16 - 18)  SpO2: 99% (11-09-20 @ 04:55) (96% - 99%)  Wt(kg): --  I&O's Summary    08 Nov 2020 07:01  -  09 Nov 2020 07:00  --------------------------------------------------------  IN: 654 mL / OUT: 1300 mL / NET: -646 mL      Height (cm): 152.4 (11-09 @ 05:56)  Weight (kg): 89.4 (11-09 @ 05:56)  BMI (kg/m2): 38.5 (11-09 @ 05:56)  BSA (m2): 1.86 (11-09 @ 05:56)      JVP: Normal  Neck: supple  Lung: clear   CV: S1 S2 , Murmur:  Abd: soft  Ext: No edema  neuro: Awake / alert  Psych: flat affect  Skin: normal``    LABS/DATA:    CARDIAC MARKERS:                                13.2   9.69  )-----------( 257      ( 08 Nov 2020 05:51 )             41.9     11-08    139  |  100  |  59<H>  ----------------------------<  163<H>  4.2   |  26  |  1.75<H>    Ca    9.0      08 Nov 2020 05:51      proBNP:   Lipid Profile:   HgA1c:   TSH:     TELE:  EKG:

## 2020-11-09 NOTE — BRIEF OPERATIVE NOTE - NSICDXBRIEFPROCEDURE_GEN_ALL_CORE_FT
PROCEDURES:  Bypass graft, coronary artery, 1 arterial and 2 venous 09-Nov-2020 19:34:33 CABG X 2 (LIMA->LAD, SVG->RCA) Nadja Lacey

## 2020-11-09 NOTE — PRE-ANESTHESIA EVALUATION ADULT - MALLAMPATI CLASS
SPEECH THERAPY BEDSIDE SWALLOW EVALUATION   Date: 9/17/2017  Recorded diagnosis/complaint at time of admission:    There are no active hospital problems to display for this patient.      Observation:    Respiratory Status: room air  Lines/Devices: IV, Pulse Ox, Telemetry  Behavior/Cognition: cooperative, pleasant mood, confused  Patient Positioning: upright in bed  Collaboration with Nurse Cheryl    Subjective:  Pt. reported agreeable to therapy  Pt's personal goal for therapy: patient's didn't state due to cognitive status. Patient sang \"you know how to treat a woman\" after giving her a sip of water.    Pain:  None Reported  None Observed     Prior Function:   Prior Function  Prior Cognition: Impaired  Dentition - Upper Teeth: Sparse  Dentition - Lower Teeth: Edentulous  Feeds Self: Yes  Liquids: Thin  Solids : General  Prior Living Situation  Information Provided By:: Chart review (caregiver from group home 7/22)  Type of Home: Group home  Home Layout: One level  # Steps to Enter: 0  # Steps in the Home: 0  Bathroom Shower/Tub: Tub/Shower unit  Bathroom Toilet: Standard  Bathroom Accessibility: Entry Level  Home Equipment: Two-wheeled walker  Additional Comments: pt lives in a group home.  unable to provide details of function, hx of mental illness     Objective:   Oral-Motor Exam:  Facial Symmetry: General Observation: Intact  Labial Symmetry: Range of Motion: Decreased Bilaterally, Minimally Impaired  Strength: Decreased Bilaterally, Minimally Impaired   Coordination: Decreased Bilaterally, Minimally Impaired  Lingual Symmetry: Range of Motion: Decreased Bilaterally, Minimally Impaired  Strength: Decreased Bilaterally, Minimally Impaired   Coordination: Decreased Bilaterally, Minimally Impaired  Velum Symmetry: General Observation: Intact  Jaw ROM: Range of Motion: Intact  Strength: Intact   Protective Mechanisms: Voluntary Cough:  Weak  Volitional Swallow:  patient could not follow the direction to  swallow    Clinical Swallow:  Oral Phase   Thin Liquids Puree Solid Comments   Intact x x x    Anterior Loss - Left       Anterior Loss - Right       Pocketing - Left       Pocketing - Right       Slow Oral Transit       Premature Spillage Suspected       Bolus Holding       Oral Residuals       Impaired Bolus Control       Lingual Pumping       Slow Mastication       Incomplete Mastication       Excessive Mastication         Pharyngeal Phase   Thin Liquids Puree Solid Comments   Intact  x x    Delayed Swallow Response Suspected x      Decreased Hyolaryngeal Elevation Suspected       Incomplete Swallow Suspected       Multiple Swallows Noted       Cough - Immediate       Cough - Delayed       Throat Clear - Immediate       Throat Clear - Delayed       Vocal Quality Change       Change in Vitals       No Pharyngeal Response         Esophageal Phase   Comments   Esophageal Symptoms Noted none         Pain Associated with Swallow:  No  Swallow Strategies/Techniques found effective during trials:  Must be fed     Assessment:    Orders received and bedside swallow evaluation completed. Patient speaks words, but in song only. Patient's cognitive status is at baseline per RN report. She was able to tell me the month and date, independently. Oral motor mechanism examination revealed mild impairment of labial and lingual function. She is unable to swallow when requested, but can produce a weak cough. Patient was assessed with thin liquids, puree and solid diet consistencies. Despite lack of dentition, the patient tolerated all consistencies with good oral control and clearance. Pharyngeal phase may be slightly delayed with regard to the swallow initiation with thin liquids, however no signs or symptoms of aspiration were observed. SLP recommends dysphagia 3/easy chew with thin liquid consistencies and pills whole with thin liquids. Speech therapy will continue to follow to assess diet tolerance.      Goals:    Rehab  Potential:  Good  Review Date: 9/24/2017  Patient will tolerate a dysphagia 3/easy chew consistency diet with thin liquids without signs or symptoms of aspiration.  Patient will follow swallowing guidelines with minimal cues.      Plan of Care/Recommendations:   Interventions:meal assessment  Plan  Frequency: 1-2x/day, 5-6x/week  Recommendations for Discharge: SLP: SNF         Recommendation for Discharge: PT: SNF (long term care)        Recommendations for Discharge: OT: Post acute therapy (pending progress and assist avalible at group home)  Therapy Recommendations: Assess tolerance of least restrictive diet, Patient/family education  Swallow Recommendations: Dysphagia treatment  Diet Solids Recommendation: Dysphagia 3/easy chew  Diet Liquids Recommendations: Thin liquids  Recommended Form of Meds: Whole  Feeding Guidelines: Small bites/sips, Sit fully upright for all PO intake, Periodic supervision    After today's session this therapist is recommending the above location for optimal discharge.  This recommendation is supported by patient's range and severity of deficits.      Education:   Learner: Patient  Readiness to Learn: acceptance  Learning Method: Verbal and Written  Learning Evaluation: Verbalizes Understanding  Teaching Content: diet recommendations, swallowing guidelines, plan of care for speech therapy   Please reference the patient education activity for further information regarding the patient's learning assessment.     SLP Time Spent: 30 min    The plan of care and goals were established with the patient and she is in agreement.                    Class IV (difficult) - the soft palate is not visible at all

## 2020-11-09 NOTE — PROGRESS NOTE ADULT - SUBJECTIVE AND OBJECTIVE BOX
Overnight events noted   VITAL: T(C): , Max: 37.2 (11-08-20 @ 12:49) T(F): , Max: 98.9 (11-08-20 @ 12:49) HR: 63 (11-09-20 @ 04:55) BP: 184/78 (11-09-20 @ 04:55) RR: 16 (11-09-20 @ 04:55) SpO2: 99% (11-09-20 @ 04:55)   PHYSICAL EXAM: Constitutional: NAD, alert, obese HEENT: NCAT, MMM Neck: Supple, No JVD Respiratory: CTA-b/l Cardiovascular: RRR s1s2, no m/r/g Gastrointestinal: BS+, soft, NT/ND Extremities: No peripheral edema b/l Neurological: no focal deficits; strength grossly intact Back: no CVAT b/l Skin: No rashes, no nevi   LABS:                      13.2  9.69  )-----------( 257      ( 08 Nov 2020 05:51 )            41.9   Na(139)/K(4.2)/Cl(100)/HCO3(26)/BUN(59)/Cr(1.75)Glu(163)/Ca(9.0)/Mg(--)/PO4(--)    11-08 @ 05:51 Na(134)/K(4.1)/Cl(96)/HCO3(26)/BUN(62)/Cr(2.22)Glu(174)/Ca(9.1)/Mg(--)/PO4(--)    11-07 @ 07:27   IMPRESSION: 80F w/ HTN, DM2, CKD3a, and HFpEF, 11/2/20 a/w right hip fracture s/p mechanical fall  (1)Renal - stage 3b with non-nephrotic range proteinuria; due to DM/HTN  (2)Cardiac - planned for CABG today  (3)Ortho - right hip fracture - will have to wait at least 4 weeks post CABG for MARLA   RECOMMEND: (1)No renal objection to proceeding with CABG today as planned    Ignacio Morataya MD [facility] Office: [phone] Cell: [phone]       No pain, no sob   VITAL: T(C): , Max: 37.2 (11-08-20 @ 12:49) T(F): , Max: 98.9 (11-08-20 @ 12:49) HR: 63 (11-09-20 @ 04:55) BP: 184/78 (11-09-20 @ 04:55) RR: 16 (11-09-20 @ 04:55) SpO2: 99% (11-09-20 @ 04:55)   PHYSICAL EXAM: Constitutional: NAD, alert, obese HEENT: NCAT, MMM Neck: Supple, No JVD Respiratory: CTA-b/l Cardiovascular: RRR s1s2, no m/r/g Gastrointestinal: BS+, soft, NT/ND Extremities: No peripheral edema b/l Neurological: no focal deficits; strength grossly intact Back: no CVAT b/l Skin: No rashes, no nevi   LABS:                      13.2  9.69  )-----------( 257      ( 08 Nov 2020 05:51 )            41.9   Na(139)/K(4.2)/Cl(100)/HCO3(26)/BUN(59)/Cr(1.75)Glu(163)/Ca(9.0)/Mg(--)/PO4(--)    11-08 @ 05:51 Na(134)/K(4.1)/Cl(96)/HCO3(26)/BUN(62)/Cr(2.22)Glu(174)/Ca(9.1)/Mg(--)/PO4(--)    11-07 @ 07:27   IMPRESSION: 80F w/ HTN, DM2, CKD3a, and HFpEF, 11/2/20 a/w right hip fracture s/p mechanical fall  (1)Renal - stage 3b with non-nephrotic range proteinuria; due to DM/HTN  (2)Cardiac - planned for CABG today  (3)Ortho - right hip fracture - will have to wait at least 4 weeks post CABG for MARLA   RECOMMEND: (1)No renal objection to proceeding with CABG today as planned    Ignacio Morataya MD [facility] Office: [phone] Cell: [phone]

## 2020-11-10 DIAGNOSIS — Z95.1 PRESENCE OF AORTOCORONARY BYPASS GRAFT: ICD-10-CM

## 2020-11-10 LAB
ALBUMIN SERPL ELPH-MCNC: 3.2 G/DL — LOW (ref 3.3–5)
ALP SERPL-CCNC: 47 U/L — SIGNIFICANT CHANGE UP (ref 40–120)
ALT FLD-CCNC: 19 U/L — SIGNIFICANT CHANGE UP (ref 10–45)
ANION GAP SERPL CALC-SCNC: 11 MMOL/L — SIGNIFICANT CHANGE UP (ref 5–17)
APTT BLD: 30.5 SEC — SIGNIFICANT CHANGE UP (ref 27.5–35.5)
AST SERPL-CCNC: 41 U/L — HIGH (ref 10–40)
BILIRUB SERPL-MCNC: 0.6 MG/DL — SIGNIFICANT CHANGE UP (ref 0.2–1.2)
BUN SERPL-MCNC: 34 MG/DL — HIGH (ref 7–23)
CALCIUM SERPL-MCNC: 8.3 MG/DL — LOW (ref 8.4–10.5)
CHLORIDE SERPL-SCNC: 105 MMOL/L — SIGNIFICANT CHANGE UP (ref 96–108)
CO2 SERPL-SCNC: 24 MMOL/L — SIGNIFICANT CHANGE UP (ref 22–31)
CREAT SERPL-MCNC: 1.32 MG/DL — HIGH (ref 0.5–1.3)
GAS PNL BLDA: SIGNIFICANT CHANGE UP
GAS PNL BLDA: SIGNIFICANT CHANGE UP
GLUCOSE BLDC GLUCOMTR-MCNC: 105 MG/DL — HIGH (ref 70–99)
GLUCOSE BLDC GLUCOMTR-MCNC: 111 MG/DL — HIGH (ref 70–99)
GLUCOSE BLDC GLUCOMTR-MCNC: 115 MG/DL — HIGH (ref 70–99)
GLUCOSE BLDC GLUCOMTR-MCNC: 119 MG/DL — HIGH (ref 70–99)
GLUCOSE BLDC GLUCOMTR-MCNC: 120 MG/DL — HIGH (ref 70–99)
GLUCOSE BLDC GLUCOMTR-MCNC: 120 MG/DL — HIGH (ref 70–99)
GLUCOSE BLDC GLUCOMTR-MCNC: 125 MG/DL — HIGH (ref 70–99)
GLUCOSE BLDC GLUCOMTR-MCNC: 129 MG/DL — HIGH (ref 70–99)
GLUCOSE BLDC GLUCOMTR-MCNC: 131 MG/DL — HIGH (ref 70–99)
GLUCOSE BLDC GLUCOMTR-MCNC: 133 MG/DL — HIGH (ref 70–99)
GLUCOSE BLDC GLUCOMTR-MCNC: 139 MG/DL — HIGH (ref 70–99)
GLUCOSE BLDC GLUCOMTR-MCNC: 151 MG/DL — HIGH (ref 70–99)
GLUCOSE BLDC GLUCOMTR-MCNC: 159 MG/DL — HIGH (ref 70–99)
GLUCOSE BLDC GLUCOMTR-MCNC: 160 MG/DL — HIGH (ref 70–99)
GLUCOSE BLDC GLUCOMTR-MCNC: 176 MG/DL — HIGH (ref 70–99)
GLUCOSE BLDC GLUCOMTR-MCNC: 179 MG/DL — HIGH (ref 70–99)
GLUCOSE BLDC GLUCOMTR-MCNC: 182 MG/DL — HIGH (ref 70–99)
GLUCOSE BLDC GLUCOMTR-MCNC: 200 MG/DL — HIGH (ref 70–99)
GLUCOSE BLDC GLUCOMTR-MCNC: 82 MG/DL — SIGNIFICANT CHANGE UP (ref 70–99)
GLUCOSE SERPL-MCNC: 125 MG/DL — HIGH (ref 70–99)
HCT VFR BLD CALC: 36.4 % — SIGNIFICANT CHANGE UP (ref 34.5–45)
HGB BLD-MCNC: 11.7 G/DL — SIGNIFICANT CHANGE UP (ref 11.5–15.5)
INR BLD: 1.04 RATIO — SIGNIFICANT CHANGE UP (ref 0.88–1.16)
MAGNESIUM SERPL-MCNC: 2.8 MG/DL — HIGH (ref 1.6–2.6)
MCHC RBC-ENTMCNC: 29.5 PG — SIGNIFICANT CHANGE UP (ref 27–34)
MCHC RBC-ENTMCNC: 32.1 GM/DL — SIGNIFICANT CHANGE UP (ref 32–36)
MCV RBC AUTO: 91.7 FL — SIGNIFICANT CHANGE UP (ref 80–100)
NRBC # BLD: 0 /100 WBCS — SIGNIFICANT CHANGE UP (ref 0–0)
PHOSPHATE SERPL-MCNC: 2.3 MG/DL — LOW (ref 2.5–4.5)
PLATELET # BLD AUTO: 204 K/UL — SIGNIFICANT CHANGE UP (ref 150–400)
POTASSIUM SERPL-MCNC: 4.6 MMOL/L — SIGNIFICANT CHANGE UP (ref 3.5–5.3)
POTASSIUM SERPL-SCNC: 4.6 MMOL/L — SIGNIFICANT CHANGE UP (ref 3.5–5.3)
PROT SERPL-MCNC: 5.1 G/DL — LOW (ref 6–8.3)
PROTHROM AB SERPL-ACNC: 12.4 SEC — SIGNIFICANT CHANGE UP (ref 10.6–13.6)
RBC # BLD: 3.97 M/UL — SIGNIFICANT CHANGE UP (ref 3.8–5.2)
RBC # FLD: 15 % — HIGH (ref 10.3–14.5)
SODIUM SERPL-SCNC: 140 MMOL/L — SIGNIFICANT CHANGE UP (ref 135–145)
WBC # BLD: 14.38 K/UL — HIGH (ref 3.8–10.5)
WBC # FLD AUTO: 14.38 K/UL — HIGH (ref 3.8–10.5)

## 2020-11-10 PROCEDURE — 71045 X-RAY EXAM CHEST 1 VIEW: CPT | Mod: 26,77

## 2020-11-10 PROCEDURE — 71045 X-RAY EXAM CHEST 1 VIEW: CPT | Mod: 26

## 2020-11-10 PROCEDURE — 93010 ELECTROCARDIOGRAM REPORT: CPT

## 2020-11-10 RX ORDER — INSULIN LISPRO 100/ML
VIAL (ML) SUBCUTANEOUS
Refills: 0 | Status: DISCONTINUED | OUTPATIENT
Start: 2020-11-10 | End: 2020-11-11

## 2020-11-10 RX ORDER — ACETAMINOPHEN 500 MG
1000 TABLET ORAL ONCE
Refills: 0 | Status: COMPLETED | OUTPATIENT
Start: 2020-11-10 | End: 2020-11-10

## 2020-11-10 RX ORDER — ASPIRIN/CALCIUM CARB/MAGNESIUM 324 MG
325 TABLET ORAL ONCE
Refills: 0 | Status: COMPLETED | OUTPATIENT
Start: 2020-11-10 | End: 2020-11-10

## 2020-11-10 RX ORDER — INSULIN LISPRO 100/ML
VIAL (ML) SUBCUTANEOUS AT BEDTIME
Refills: 0 | Status: DISCONTINUED | OUTPATIENT
Start: 2020-11-10 | End: 2020-11-11

## 2020-11-10 RX ORDER — ALBUMIN HUMAN 25 %
250 VIAL (ML) INTRAVENOUS ONCE
Refills: 0 | Status: COMPLETED | OUTPATIENT
Start: 2020-11-10 | End: 2020-11-10

## 2020-11-10 RX ORDER — ATORVASTATIN CALCIUM 80 MG/1
40 TABLET, FILM COATED ORAL AT BEDTIME
Refills: 0 | Status: DISCONTINUED | OUTPATIENT
Start: 2020-11-10 | End: 2020-11-17

## 2020-11-10 RX ORDER — HYDROMORPHONE HYDROCHLORIDE 2 MG/ML
0.5 INJECTION INTRAMUSCULAR; INTRAVENOUS; SUBCUTANEOUS ONCE
Refills: 0 | Status: DISCONTINUED | OUTPATIENT
Start: 2020-11-10 | End: 2020-11-10

## 2020-11-10 RX ORDER — WARFARIN SODIUM 2.5 MG/1
5 TABLET ORAL ONCE
Refills: 0 | Status: COMPLETED | OUTPATIENT
Start: 2020-11-10 | End: 2020-11-10

## 2020-11-10 RX ORDER — ENOXAPARIN SODIUM 100 MG/ML
40 INJECTION SUBCUTANEOUS DAILY
Refills: 0 | Status: DISCONTINUED | OUTPATIENT
Start: 2020-11-10 | End: 2020-11-15

## 2020-11-10 RX ORDER — METOPROLOL TARTRATE 50 MG
25 TABLET ORAL
Refills: 0 | Status: DISCONTINUED | OUTPATIENT
Start: 2020-11-10 | End: 2020-11-11

## 2020-11-10 RX ADMIN — ENOXAPARIN SODIUM 40 MILLIGRAM(S): 100 INJECTION SUBCUTANEOUS at 12:44

## 2020-11-10 RX ADMIN — Medication 100 MILLIGRAM(S): at 15:33

## 2020-11-10 RX ADMIN — WARFARIN SODIUM 5 MILLIGRAM(S): 2.5 TABLET ORAL at 21:36

## 2020-11-10 RX ADMIN — Medication 25 MILLIGRAM(S): at 10:31

## 2020-11-10 RX ADMIN — MUPIROCIN 1 APPLICATION(S): 20 OINTMENT TOPICAL at 17:05

## 2020-11-10 RX ADMIN — Medication 1000 MILLIGRAM(S): at 06:32

## 2020-11-10 RX ADMIN — OXYCODONE HYDROCHLORIDE 5 MILLIGRAM(S): 5 TABLET ORAL at 22:06

## 2020-11-10 RX ADMIN — MUPIROCIN 1 APPLICATION(S): 20 OINTMENT TOPICAL at 05:11

## 2020-11-10 RX ADMIN — FAMOTIDINE 20 MILLIGRAM(S): 10 INJECTION INTRAVENOUS at 17:03

## 2020-11-10 RX ADMIN — Medication 325 MILLIGRAM(S): at 06:12

## 2020-11-10 RX ADMIN — ATORVASTATIN CALCIUM 40 MILLIGRAM(S): 80 TABLET, FILM COATED ORAL at 21:36

## 2020-11-10 RX ADMIN — FAMOTIDINE 20 MILLIGRAM(S): 10 INJECTION INTRAVENOUS at 05:11

## 2020-11-10 RX ADMIN — Medication 400 MILLIGRAM(S): at 06:02

## 2020-11-10 RX ADMIN — HYDROMORPHONE HYDROCHLORIDE 0.5 MILLIGRAM(S): 2 INJECTION INTRAMUSCULAR; INTRAVENOUS; SUBCUTANEOUS at 05:35

## 2020-11-10 RX ADMIN — Medication 25 MILLIGRAM(S): at 20:23

## 2020-11-10 RX ADMIN — HYDROMORPHONE HYDROCHLORIDE 0.5 MILLIGRAM(S): 2 INJECTION INTRAMUSCULAR; INTRAVENOUS; SUBCUTANEOUS at 06:05

## 2020-11-10 RX ADMIN — Medication 62.5 MILLIMOLE(S): at 10:00

## 2020-11-10 RX ADMIN — Medication 125 MILLILITER(S): at 02:32

## 2020-11-10 RX ADMIN — OXYCODONE HYDROCHLORIDE 5 MILLIGRAM(S): 5 TABLET ORAL at 21:36

## 2020-11-10 RX ADMIN — Medication 100 MILLIGRAM(S): at 02:31

## 2020-11-10 RX ADMIN — CHLORHEXIDINE GLUCONATE 1 APPLICATION(S): 213 SOLUTION TOPICAL at 05:11

## 2020-11-10 RX ADMIN — Medication 81 MILLIGRAM(S): at 12:44

## 2020-11-10 RX ADMIN — Medication 250 MILLIGRAM(S): at 15:33

## 2020-11-10 NOTE — PHYSICAL THERAPY INITIAL EVALUATION ADULT - PERTINENT HX OF CURRENT PROBLEM, REHAB EVAL
Pt is an 81 yo Vietnamese speaking F s/p CABG (11/9). Pt initially presented to ER on 11/3 w/ c/o SOB & R hip pain s/p mechanical fall 3 weeks prior. As per daughter, pt had been walking w/ AD since her fall w/ consistent pain. Pt initially admitted to orthopedic surgery for THR, but cardiac cath, abn stress test & EKG, cardiology recommended CTS first.

## 2020-11-10 NOTE — PROGRESS NOTE ADULT - PROBLEM SELECTOR PLAN 1
Transferred to step down  Pain management  Encourage incentive spirometry and ambulation as tolerated  Continue Lovenox, ASA/statin  Continue Lopressor 25 BID

## 2020-11-10 NOTE — AIRWAY REMOVAL NOTE  ADULT & PEDS - ARTIFICAL AIRWAY REMOVAL COMMENTS
Written order for extubation verified. The patient was identified by full name and birth date compared to the identification band. Present during the procedure was MARK Rawls.

## 2020-11-10 NOTE — PHYSICAL THERAPY INITIAL EVALUATION ADULT - GENERAL OBSERVATIONS, REHAB EVAL
Pt was rec'd in bedside chair, +ICU monitoring, black, ext pacer, chest tube, black, IVL, chen & 4LNC.

## 2020-11-10 NOTE — PHYSICAL THERAPY INITIAL EVALUATION ADULT - ADDITIONAL COMMENTS
Pt w/ Pmhx: renal insufficiency, HTN, DVT (Eliquis), CHF, DMII, HLD, obesity, R breast cancer s/p mastectomy, colon cancer s/p resection, rectal cancer s/p chemo and radiation, & lymphedema of arm. (+)Xray & MRI severe R hip OA, ?subchondral insufficiency fx. MÓNICA Enciso cleared pt for amb w/ no WB restrictions.    PTA as per pt & daughter Shauna, pt lives in a private home w/ ~3 steps to enter and none within. At baseline daughter tries to come everyday to assist pt w/ cleaning, cooking, bathing & dressing. Pt reports she is able to dress/bathe herself, but w/ inc'd time. Pt amb w/ walker or rollator at home & community, owns shower chair/walk in shower, grab bars, wears glasses & hearing intact

## 2020-11-10 NOTE — PROGRESS NOTE ADULT - ASSESSMENT
80F, Yakut speaking, with a PMH of renal insufficiency, HTN, DVT (on Eliquis 5mg BID, unsure of last dose prior to admission), CHF, DMII, HLD, obesity, R breast cancer s/p mastectomy, colon cancer s/p resection, rectal cancer s/p chemo and radiation, lymphedema of arm, presenting with R hip pain s/p mechanical fall 3 weeks prior to admission. Discussed case with daughter Shauna as well. Pt has been walking since her fall 3 weeks ago with consistent pain. Pt is able to bear weight and walks with assistive devices. Pt presented to ED with shortness of breath as well. Admitted to orthopedic surgery, planned for total hip replacement if medically cleared. Cardiology stated that based on current ACC/AHA guidelines, patient history and physical exam, the patient is considered to have elevated risk given her abn stress test, sob and abn EKG, and recommended that pt needs cath prior to surgery. Cath showed 60% stenosis of pLAD, 90% stenosis of Cx and 90% stenosis of RCA. TTE showed normal LV function. CT surgery was consulted for CABG evaluation w/ Dr. Heath.    11/5: Pt seen and evaluated at bedside - NICO, NAD. Pt is amenable to CT surgery at this time. CT surgery pre-op workup in progress. D/w Dr. Heath - pt scheduled for OR Monday 11/9. Recommend initiating ASA 81 mg QD pre-op and continuation of Statin and beta blocker.  11/7: preop workup in progress; continue asa/ statin/b-blockers, mrsa nares- on bactroban, ck covid in am, [facility] 11/9 with Dr. Heath   11/8: VSS, COVID PCR sent, lovenox dc'd. OR Mon 2nd case.   11/9: S/p CABG (LIMA to LAD, SVG to RCA)  11/10: Extubated successfully, transferred to step down. Continue Lovenox, ASA/statin, lopressor. Continue insulin gtt. 80F, Ukrainian speaking, with a PMH of renal insufficiency, HTN, DVT (on Eliquis 5mg BID, unsure of last dose prior to admission), CHF, DMII, HLD, obesity, R breast cancer s/p mastectomy, colon cancer s/p resection, rectal cancer s/p chemo and radiation, lymphedema of arm, presenting with R hip pain s/p mechanical fall 3 weeks prior to admission. Discussed case with daughter Shauna as well. Pt has been walking since her fall 3 weeks ago with consistent pain. Pt is able to bear weight and walks with assistive devices. Pt presented to ED with shortness of breath as well. Admitted to orthopedic surgery, planned for total hip replacement if medically cleared. Cardiology stated that based on current ACC/AHA guidelines, patient history and physical exam, the patient is considered to have elevated risk given her abn stress test, sob and abn EKG, and recommended that pt needs cath prior to surgery. Cath showed 60% stenosis of pLAD, 90% stenosis of Cx and 90% stenosis of RCA. TTE showed normal LV function. CT surgery was consulted for CABG evaluation w/ Dr. Heath.    11/5: Pt seen and evaluated at bedside - NICO, NAD. Pt is amenable to CT surgery at this time. CT surgery pre-op workup in progress. D/w Dr. Heath - pt scheduled for OR Monday 11/9. Recommend initiating ASA 81 mg QD pre-op and continuation of Statin and beta blocker.  11/7: preop workup in progress; continue asa/ statin/b-blockers, mrsa nares- on bactroban, ck covid in am, [facility] 11/9 with Dr. Heath   11/8: VSS, COVID PCR sent, lovenox dc'd. OR Mon 2nd case.   11/9: S/p CABG (LIMA to LAD, SVG to RCA)  11/10: Extubated successfully, transferred to step down. Continue Lovenox, ASA/statin, lopressor. Continue insulin gtt. PT recommending subacute rehab

## 2020-11-10 NOTE — PROGRESS NOTE ADULT - SUBJECTIVE AND OBJECTIVE BOX
Subjective: Patient seen and examined. No new events except as noted.     SUBJECTIVE/ROS:  s/p bypass  sitting in chair  awake       MEDICATIONS:  MEDICATIONS  (STANDING):  aspirin enteric coated 81 milliGRAM(s) Oral daily  aspirin Suppository 300 milliGRAM(s) Rectal once  cefuroxime  IVPB 1500 milliGRAM(s) IV Intermittent every 12 hours  chlorhexidine 2% Cloths 1 Application(s) Topical <User Schedule>  dexMEDEtomidine Infusion 0.2 MICROgram(s)/kG/Hr (4.47 mL/Hr) IV Continuous <Continuous>  enoxaparin Injectable 40 milliGRAM(s) SubCutaneous daily  famotidine Injectable 20 milliGRAM(s) IV Push every 12 hours  insulin regular Infusion 3 Unit(s)/Hr (3 mL/Hr) IV Continuous <Continuous>  mupirocin 2% Ointment 1 Application(s) Both Nostrils two times a day  norepinephrine Infusion 0.05 MICROgram(s)/kG/Min (8.38 mL/Hr) IV Continuous <Continuous>  polyethylene glycol 3350 17 Gram(s) Oral daily  sodium chloride 0.9%. 1000 milliLiter(s) (10 mL/Hr) IV Continuous <Continuous>  sodium phosphate IVPB 15 milliMole(s) IV Intermittent once  vancomycin  IVPB 1000 milliGRAM(s) IV Intermittent once      PHYSICAL EXAM:  T(C): 35.6 (11-10-20 @ 03:00), Max: 36.7 (11-09-20 @ 13:13)  HR: 57 (11-10-20 @ 07:00) (56 - 100)  BP: 148/63 (11-09-20 @ 14:38) (148/63 - 148/63)  RR: 17 (11-10-20 @ 07:00) (12 - 28)  SpO2: 96% (11-10-20 @ 07:00) (96% - 100%)  Wt(kg): --  I&O's Summary    09 Nov 2020 07:01  -  10 Nov 2020 07:00  --------------------------------------------------------  IN: 2109.6 mL / OUT: 1210 mL / NET: 899.6 mL      Height (cm): 152.4 (11-09 @ 14:38)  Weight (kg): 89.4 (11-09 @ 14:38)  BMI (kg/m2): 38.5 (11-09 @ 14:38)  BSA (m2): 1.86 (11-09 @ 14:38)      JVP: Normal  Neck: supple  Lung: clear   CV: S1 S2 , Murmur:  Abd: soft  Ext: No edema  neuro: Awake / alert  Psych: flat affect  Skin: normal``    LABS/DATA:    CARDIAC MARKERS:  CARDIAC MARKERS ( 09 Nov 2020 19:58 )  x     / x     / 221 U/L / x     / 30.7 ng/mL                                11.7   14.38 )-----------( 204      ( 10 Nov 2020 02:00 )             36.4     11-10    140  |  105  |  34<H>  ----------------------------<  125<H>  4.6   |  24  |  1.32<H>    Ca    8.3<L>      10 Nov 2020 02:00  Phos  2.3     11-10  Mg     2.8     11-10    TPro  5.1<L>  /  Alb  3.2<L>  /  TBili  0.6  /  DBili  x   /  AST  41<H>  /  ALT  19  /  AlkPhos  47  11-10    proBNP:   Lipid Profile:   HgA1c:   TSH:     TELE:  EKG:

## 2020-11-10 NOTE — PROGRESS NOTE ADULT - SUBJECTIVE AND OBJECTIVE BOX
CARDIOTHORACIC SURGERY DAILY PROGRESS NOTE  NURIA GARCIA | [id number] | [facility] 275 D1    PATIENT SEEN AT 11-10-20 @ 13:45    Subjective: Patient is resting well in bed this AM. No acute events overnight. Patient denies fever/chills, CP, SOB, nausea, vomiting, diarrhea, constipation, and abd pain. No other concerns this AM.    Patient seen and examined at bedside.     Vital Signs:  Vital Signs Last 24 Hrs  T(C): 36.8 (11-10-20 @ 13:32), Max: 36.8 (11-10-20 @ 13:32)  T(F): 98.3 (11-10-20 @ 13:32), Max: 98.3 (11-10-20 @ 13:32)  HR: 71 (11-10-20 @ 13:32) (56 - 100)  BP: 115/55 (11-10-20 @ 13:32) (115/55 - 148/63)  RR: 19 (11-10-20 @ 13:32) (12 - 28)  SpO2: 100% (11-10-20 @ 13:32) (96% - 100%) on (O2)    TELEMETRY:     11-09-20 @ 07:01  -  11-10-20 @ 07:00  --------------------------------------------------------  IN:    Albumin 5%  - 250 mL: 250 mL    Dexmedetomidine: 79.6 mL    Insulin: 35 mL    IV PiggyBack: 200 mL    IV PiggyBack: 100 mL    NiCARdipine: 15 mL    Norepinephrine: 30 mL    Oral Fluid: 30 mL    sodium chloride 0.9%: 120 mL    Sodium Chloride 0.9% Bolus: 1250 mL  Total IN: 2109.6 mL    OUT:    Chest Tube (mL): 130 mL    Chest Tube (mL): 60 mL    Ureteral Catheter (mL): 1020 mL  Total OUT: 1210 mL    Total NET: 899.6 mL      11-10-20 @ 07:01  -  11-10-20 @ 13:45  --------------------------------------------------------  IN:    Insulin: 16 mL    IV PiggyBack: 180 mL    Oral Fluid: 24 mL    sodium chloride 0.9%: 50 mL  Total IN: 270 mL    OUT:    Chest Tube (mL): 60 mL    Chest Tube (mL): 0 mL    Ureteral Catheter (mL): 165 mL  Total OUT: 225 mL    Total NET: 45 mL        PHYSICAL EXAM:  General: WN/WD NAD  Neurology: Awake, nonfocal, CUEVAS x 4  Eyes: Scleras clear, PERRLA/ EOMI, Gross vision intact  ENT:Gross hearing intact, grossly patent pharynx, no stridor  Neck: Neck supple, trachea midline, No JVD,   Respiratory: CTA B/L, No wheezing, rales, rhonchi  CV: ventricular pacing wires in place, SR, S1S2, no murmurs, rubs or gallops  Chest: stable chest, sternal dressing c/d/i  Abdominal: Soft, NT, ND +BS,   Extremities: No edema, + peripheral pulses  Skin: No Rashes, Hematoma, Ecchymosis  Lymphatic: No Neck, axilla, groin LAD  Psych: Oriented x 3, normal affect  Tubes: L pleural 60cc since this AM, meds CT 0cc since this AM    Relevant labs, radiology and Medications reviewed                          11.7   14.38 )-----------( 204      ( 10 Nov 2020 02:00 )             36.4     11-10    140  |  105  |  34<H>  ----------------------------<  125<H>  4.6   |  24  |  1.32<H>    Ca    8.3<L>      10 Nov 2020 02:00  Phos  2.3     11-10  Mg     2.8     11-10    TPro  5.1<L>  /  Alb  3.2<L>  /  TBili  0.6  /  DBili  x   /  AST  41<H>  /  ALT  19  /  AlkPhos  47  11-10    PT/INR - ( 10 Nov 2020 02:00 )   PT: 12.4 sec;   INR: 1.04 ratio         PTT - ( 10 Nov 2020 02:00 )  PTT:30.5 sec            MEDICATIONS  (STANDING):  aspirin enteric coated 81 milliGRAM(s) Oral daily  aspirin Suppository 300 milliGRAM(s) Rectal once  atorvastatin 40 milliGRAM(s) Oral at bedtime  cefuroxime  IVPB 1500 milliGRAM(s) IV Intermittent every 12 hours  chlorhexidine 2% Cloths 1 Application(s) Topical <User Schedule>  enoxaparin Injectable 40 milliGRAM(s) SubCutaneous daily  famotidine Injectable 20 milliGRAM(s) IV Push every 12 hours  insulin regular Infusion 3 Unit(s)/Hr (3 mL/Hr) IV Continuous <Continuous>  metoprolol tartrate 25 milliGRAM(s) Oral two times a day  mupirocin 2% Ointment 1 Application(s) Both Nostrils two times a day  polyethylene glycol 3350 17 Gram(s) Oral daily  sodium chloride 0.9%. 1000 milliLiter(s) (10 mL/Hr) IV Continuous <Continuous>  vancomycin  IVPB 1000 milliGRAM(s) IV Intermittent once    MEDICATIONS  (PRN):  acetaminophen   Tablet .. 650 milliGRAM(s) Oral every 6 hours PRN Temp greater or equal to 38.5C (101.3F), Mild Pain (1 - 3)  oxyCODONE    IR 5 milliGRAM(s) Oral every 6 hours PRN Moderate Pain (4 - 6)    Pt was discussed with Dr. Heath CARDIOTHORACIC SURGERY DAILY PROGRESS NOTE  NURIA GARCIA | [id number] | [facility] 275 D1    PATIENT SEEN AT 11-10-20 @ 13:45    Subjective: Patient is resting well in her chair this afternoon. No acute events since transfer from the ICU. Pt admits to mild incisional CP. Patient otherwise denies fever/chills, SOB, nausea, vomiting, and abd pain. No other concerns this afternoon.    Patient seen and examined at bedside.     Vital Signs:  Vital Signs Last 24 Hrs  T(C): 36.8 (11-10-20 @ 13:32), Max: 36.8 (11-10-20 @ 13:32)  T(F): 98.3 (11-10-20 @ 13:32), Max: 98.3 (11-10-20 @ 13:32)  HR: 71 (11-10-20 @ 13:32) (56 - 100)  BP: 115/55 (11-10-20 @ 13:32) (115/55 - 148/63)  RR: 19 (11-10-20 @ 13:32) (12 - 28)  SpO2: 100% (11-10-20 @ 13:32) (96% - 100%) on (O2)    TELEMETRY:     11-09-20 @ 07:01  -  11-10-20 @ 07:00  --------------------------------------------------------  IN:    Albumin 5%  - 250 mL: 250 mL    Dexmedetomidine: 79.6 mL    Insulin: 35 mL    IV PiggyBack: 200 mL    IV PiggyBack: 100 mL    NiCARdipine: 15 mL    Norepinephrine: 30 mL    Oral Fluid: 30 mL    sodium chloride 0.9%: 120 mL    Sodium Chloride 0.9% Bolus: 1250 mL  Total IN: 2109.6 mL    OUT:    Chest Tube (mL): 130 mL    Chest Tube (mL): 60 mL    Ureteral Catheter (mL): 1020 mL  Total OUT: 1210 mL    Total NET: 899.6 mL      11-10-20 @ 07:01  -  11-10-20 @ 13:45  --------------------------------------------------------  IN:    Insulin: 16 mL    IV PiggyBack: 180 mL    Oral Fluid: 24 mL    sodium chloride 0.9%: 50 mL  Total IN: 270 mL    OUT:    Chest Tube (mL): 60 mL    Chest Tube (mL): 0 mL    Ureteral Catheter (mL): 165 mL  Total OUT: 225 mL    Total NET: 45 mL        PHYSICAL EXAM:  General: WN/WD NAD  Neurology: Awake, nonfocal, CUEVAS x 4  Eyes: Scleras clear, PERRLA/ EOMI, Gross vision intact  ENT: Gross hearing intact, grossly patent pharynx, no stridor  Neck: Neck supple, trachea midline, No JVD,   Respiratory: CTA B/L, No wheezing, rales, rhonchi  CV: ventricular pacing wires in place, SR, S1S2, no murmurs, rubs or gallops  Chest: stable chest, sternal dressing c/d/i, infrasternal dressing with mild strikethrough  Abdominal: Soft, NT, ND +BS,   Extremities: Mild to moderate BLE peripheral edema, + peripheral pulses, RLE dressing is c/d/i  Skin: No Rashes, Hematoma, Ecchymosis  Lymphatic: No Neck, axilla, groin LAD  Psych: Oriented x 3, normal affect  Tubes: L pleural 60cc since this AM, meds CT 0cc since this AM    Relevant labs, radiology and Medications reviewed                          11.7   14.38 )-----------( 204      ( 10 Nov 2020 02:00 )             36.4     11-10    140  |  105  |  34<H>  ----------------------------<  125<H>  4.6   |  24  |  1.32<H>    Ca    8.3<L>      10 Nov 2020 02:00  Phos  2.3     11-10  Mg     2.8     11-10    TPro  5.1<L>  /  Alb  3.2<L>  /  TBili  0.6  /  DBili  x   /  AST  41<H>  /  ALT  19  /  AlkPhos  47  11-10    PT/INR - ( 10 Nov 2020 02:00 )   PT: 12.4 sec;   INR: 1.04 ratio         PTT - ( 10 Nov 2020 02:00 )  PTT:30.5 sec            MEDICATIONS  (STANDING):  aspirin enteric coated 81 milliGRAM(s) Oral daily  aspirin Suppository 300 milliGRAM(s) Rectal once  atorvastatin 40 milliGRAM(s) Oral at bedtime  cefuroxime  IVPB 1500 milliGRAM(s) IV Intermittent every 12 hours  chlorhexidine 2% Cloths 1 Application(s) Topical <User Schedule>  enoxaparin Injectable 40 milliGRAM(s) SubCutaneous daily  famotidine Injectable 20 milliGRAM(s) IV Push every 12 hours  insulin regular Infusion 3 Unit(s)/Hr (3 mL/Hr) IV Continuous <Continuous>  metoprolol tartrate 25 milliGRAM(s) Oral two times a day  mupirocin 2% Ointment 1 Application(s) Both Nostrils two times a day  polyethylene glycol 3350 17 Gram(s) Oral daily  sodium chloride 0.9%. 1000 milliLiter(s) (10 mL/Hr) IV Continuous <Continuous>  vancomycin  IVPB 1000 milliGRAM(s) IV Intermittent once    MEDICATIONS  (PRN):  acetaminophen   Tablet .. 650 milliGRAM(s) Oral every 6 hours PRN Temp greater or equal to 38.5C (101.3F), Mild Pain (1 - 3)  oxyCODONE    IR 5 milliGRAM(s) Oral every 6 hours PRN Moderate Pain (4 - 6)    Pt was discussed with Dr. Heath

## 2020-11-10 NOTE — PROGRESS NOTE ADULT - SUBJECTIVE AND OBJECTIVE BOX
On low-dose Norepinephrine gtt   VITAL: T(C): , Max: 36.7 (11-09-20 @ 13:13) T(F): , Max: 98 (11-09-20 @ 13:13) HR: 59 (11-10-20 @ 08:00) BP: 148/63 (11-09-20 @ 14:38) RR: 15 (11-10-20 @ 08:00) SpO2: 97% (11-10-20 @ 08:00)   PHYSICAL EXAM: Constitutional: NAD, alert, obese HEENT: NCAT, MMM Neck: Supple, No JVD Respiratory: CTA-b/l Cardiovascular: alfred s1s2, no m/r/g Gastrointestinal: BS+, soft, NT/ND Extremities: No peripheral edema b/l Neurological: no focal deficits; strength grossly intact Back: no CVAT b/l Skin: midsternal wound dressed  LABS:                      11.7  14.38 )-----------( 204      ( 10 Nov 2020 02:00 )            36.4   Na(140)/K(4.6)/Cl(105)/HCO3(24)/BUN(34)/Cr(1.32)Glu(125)/Ca(8.3)/Mg(2.8)/PO4(2.3)    11-10 @ 02:00 Na(140)/K(4.3)/Cl(104)/HCO3(27)/BUN(37)/Cr(1.25)Glu(139)/Ca(8.3)/Mg(3.0)/PO4(2.3)    11-09 @ 19:58 Na(139)/K(4.2)/Cl(100)/HCO3(26)/BUN(59)/Cr(1.75)Glu(163)/Ca(9.0)/Mg(--)/PO4(--)    11-08 @ 05:51   IMPRESSION: 80F w/ HTN, DM2, CKD3a, and HFpEF, 11/2/20 a/w right hip fracture s/p mechanical fall  (1)Renal - stage 3b with non-nephrotic range proteinuria; due to DM/HTN. No evidence to date of TAO, s/p CABG yesterday  (2)Hypophosphatemia - being repleted  (3)CTS - POD#1 s/p CABG  (4)Ortho - right hip fracture - will have to wait at least 4 weeks post CABG for MARLA   RECOMMEND: (1)Sodium phosphate as ordered (2)Dose new meds for GFR 35-45ml/min (present dosing acceptable) (3)BMP+Mg+PO4 daily     Ignacio Morataya MD [facility] Office: [phone] Cell: [phone]       Off pressors Extubated this a.m. No complaints   VITAL: T(C): , Max: 36.7 (11-09-20 @ 13:13) T(F): , Max: 98 (11-09-20 @ 13:13) HR: 59 (11-10-20 @ 08:00) BP: 148/63 (11-09-20 @ 14:38) RR: 15 (11-10-20 @ 08:00) SpO2: 97% (11-10-20 @ 08:00)   PHYSICAL EXAM: Constitutional: NAD, alert, obese HEENT: NCAT, DMM Neck: Supple, No JVD Respiratory: CTA-R; decreased on left; (+)chest tube x 1 Cardiovascular: alrfed s1s2, no m/r/g Gastrointestinal: BS+, soft, NT/ND : (+)chen Extremities: No peripheral edema b/l Neurological: no focal deficits; strength grossly intact Back: no CVAT b/l Skin: midsternal wound dressed  LABS:                      11.7  14.38 )-----------( 204      ( 10 Nov 2020 02:00 )            36.4   Na(140)/K(4.6)/Cl(105)/HCO3(24)/BUN(34)/Cr(1.32)Glu(125)/Ca(8.3)/Mg(2.8)/PO4(2.3)    11-10 @ 02:00 Na(140)/K(4.3)/Cl(104)/HCO3(27)/BUN(37)/Cr(1.25)Glu(139)/Ca(8.3)/Mg(3.0)/PO4(2.3)    11-09 @ 19:58 Na(139)/K(4.2)/Cl(100)/HCO3(26)/BUN(59)/Cr(1.75)Glu(163)/Ca(9.0)/Mg(--)/PO4(--)    11-08 @ 05:51   IMPRESSION: 80F w/ HTN, DM2, CKD3a, and HFpEF, 11/2/20 a/w right hip fracture s/p mechanical fall  (1)Renal - stage 3b with non-nephrotic range proteinuria; due to DM/HTN. No evidence to date of TAO, s/p CABG yesterday  (2)Hypophosphatemia - being repleted  (3)CTS - POD#1 s/p CABG  (4)Ortho - right hip fracture - will have to wait at least 4 weeks post CABG for MARLA   RECOMMEND: (1)Sodium phosphate as ordered (2)Dose new meds for GFR 35-45ml/min (present dosing acceptable) (3)BMP+Mg+PO4 daily     Ignacio Morataya MD [facility] Office: [phone] Cell: [phone]

## 2020-11-10 NOTE — PHYSICAL THERAPY INITIAL EVALUATION ADULT - PLANNED THERAPY INTERVENTIONS, PT EVAL
strengthening/transfer training/gait training/goal: pt will negotiate 3 steps independently w/ HR within 4 weeks/bed mobility training/balance training

## 2020-11-11 DIAGNOSIS — E05.90 THYROTOXICOSIS, UNSPECIFIED WITHOUT THYROTOXIC CRISIS OR STORM: ICD-10-CM

## 2020-11-11 LAB
ANION GAP SERPL CALC-SCNC: 11 MMOL/L — SIGNIFICANT CHANGE UP (ref 5–17)
APTT BLD: 25.7 SEC — LOW (ref 27.5–35.5)
BUN SERPL-MCNC: 38 MG/DL — HIGH (ref 7–23)
CALCIUM SERPL-MCNC: 8.7 MG/DL — SIGNIFICANT CHANGE UP (ref 8.4–10.5)
CHLORIDE SERPL-SCNC: 102 MMOL/L — SIGNIFICANT CHANGE UP (ref 96–108)
CO2 SERPL-SCNC: 22 MMOL/L — SIGNIFICANT CHANGE UP (ref 22–31)
CREAT SERPL-MCNC: 1.47 MG/DL — HIGH (ref 0.5–1.3)
GLUCOSE BLDC GLUCOMTR-MCNC: 109 MG/DL — HIGH (ref 70–99)
GLUCOSE BLDC GLUCOMTR-MCNC: 119 MG/DL — HIGH (ref 70–99)
GLUCOSE BLDC GLUCOMTR-MCNC: 124 MG/DL — HIGH (ref 70–99)
GLUCOSE BLDC GLUCOMTR-MCNC: 126 MG/DL — HIGH (ref 70–99)
GLUCOSE BLDC GLUCOMTR-MCNC: 140 MG/DL — HIGH (ref 70–99)
GLUCOSE BLDC GLUCOMTR-MCNC: 146 MG/DL — HIGH (ref 70–99)
GLUCOSE BLDC GLUCOMTR-MCNC: 149 MG/DL — HIGH (ref 70–99)
GLUCOSE BLDC GLUCOMTR-MCNC: 150 MG/DL — HIGH (ref 70–99)
GLUCOSE BLDC GLUCOMTR-MCNC: 151 MG/DL — HIGH (ref 70–99)
GLUCOSE BLDC GLUCOMTR-MCNC: 177 MG/DL — HIGH (ref 70–99)
GLUCOSE BLDC GLUCOMTR-MCNC: 186 MG/DL — HIGH (ref 70–99)
GLUCOSE BLDC GLUCOMTR-MCNC: 197 MG/DL — HIGH (ref 70–99)
GLUCOSE BLDC GLUCOMTR-MCNC: 199 MG/DL — HIGH (ref 70–99)
GLUCOSE BLDC GLUCOMTR-MCNC: 235 MG/DL — HIGH (ref 70–99)
GLUCOSE BLDC GLUCOMTR-MCNC: 245 MG/DL — HIGH (ref 70–99)
GLUCOSE BLDC GLUCOMTR-MCNC: 256 MG/DL — HIGH (ref 70–99)
GLUCOSE SERPL-MCNC: 116 MG/DL — HIGH (ref 70–99)
HCT VFR BLD CALC: 34.4 % — LOW (ref 34.5–45)
HGB BLD-MCNC: 10.6 G/DL — LOW (ref 11.5–15.5)
INR BLD: 0.9 RATIO — SIGNIFICANT CHANGE UP (ref 0.88–1.16)
MAGNESIUM SERPL-MCNC: 2.5 MG/DL — SIGNIFICANT CHANGE UP (ref 1.6–2.6)
MCHC RBC-ENTMCNC: 29.6 PG — SIGNIFICANT CHANGE UP (ref 27–34)
MCHC RBC-ENTMCNC: 30.8 GM/DL — LOW (ref 32–36)
MCV RBC AUTO: 96.1 FL — SIGNIFICANT CHANGE UP (ref 80–100)
NRBC # BLD: 0 /100 WBCS — SIGNIFICANT CHANGE UP (ref 0–0)
PHOSPHATE SERPL-MCNC: 3.6 MG/DL — SIGNIFICANT CHANGE UP (ref 2.5–4.5)
PLATELET # BLD AUTO: 244 K/UL — SIGNIFICANT CHANGE UP (ref 150–400)
POTASSIUM SERPL-MCNC: 4.4 MMOL/L — SIGNIFICANT CHANGE UP (ref 3.5–5.3)
POTASSIUM SERPL-SCNC: 4.4 MMOL/L — SIGNIFICANT CHANGE UP (ref 3.5–5.3)
PROTHROM AB SERPL-ACNC: 10.9 SEC — SIGNIFICANT CHANGE UP (ref 10.6–13.6)
RBC # BLD: 3.58 M/UL — LOW (ref 3.8–5.2)
RBC # FLD: 15.6 % — HIGH (ref 10.3–14.5)
SODIUM SERPL-SCNC: 135 MMOL/L — SIGNIFICANT CHANGE UP (ref 135–145)
WBC # BLD: 27.52 K/UL — HIGH (ref 3.8–10.5)
WBC # FLD AUTO: 27.52 K/UL — HIGH (ref 3.8–10.5)

## 2020-11-11 PROCEDURE — 71045 X-RAY EXAM CHEST 1 VIEW: CPT | Mod: 26,76

## 2020-11-11 RX ORDER — WARFARIN SODIUM 2.5 MG/1
5 TABLET ORAL ONCE
Refills: 0 | Status: COMPLETED | OUTPATIENT
Start: 2020-11-11 | End: 2020-11-11

## 2020-11-11 RX ORDER — DEXTROSE 50 % IN WATER 50 %
25 SYRINGE (ML) INTRAVENOUS ONCE
Refills: 0 | Status: DISCONTINUED | OUTPATIENT
Start: 2020-11-11 | End: 2020-11-17

## 2020-11-11 RX ORDER — INSULIN GLARGINE 100 [IU]/ML
40 INJECTION, SOLUTION SUBCUTANEOUS AT BEDTIME
Refills: 0 | Status: DISCONTINUED | OUTPATIENT
Start: 2020-11-11 | End: 2020-11-13

## 2020-11-11 RX ORDER — SODIUM CHLORIDE 9 MG/ML
1000 INJECTION, SOLUTION INTRAVENOUS
Refills: 0 | Status: DISCONTINUED | OUTPATIENT
Start: 2020-11-11 | End: 2020-11-17

## 2020-11-11 RX ORDER — INSULIN LISPRO 100/ML
VIAL (ML) SUBCUTANEOUS AT BEDTIME
Refills: 0 | Status: DISCONTINUED | OUTPATIENT
Start: 2020-11-11 | End: 2020-11-17

## 2020-11-11 RX ORDER — INSULIN LISPRO 100/ML
10 VIAL (ML) SUBCUTANEOUS
Refills: 0 | Status: DISCONTINUED | OUTPATIENT
Start: 2020-11-11 | End: 2020-11-11

## 2020-11-11 RX ORDER — METOPROLOL TARTRATE 50 MG
25 TABLET ORAL THREE TIMES A DAY
Refills: 0 | Status: DISCONTINUED | OUTPATIENT
Start: 2020-11-11 | End: 2020-11-14

## 2020-11-11 RX ORDER — DEXTROSE 50 % IN WATER 50 %
15 SYRINGE (ML) INTRAVENOUS ONCE
Refills: 0 | Status: DISCONTINUED | OUTPATIENT
Start: 2020-11-11 | End: 2020-11-17

## 2020-11-11 RX ORDER — DEXTROSE 50 % IN WATER 50 %
12.5 SYRINGE (ML) INTRAVENOUS ONCE
Refills: 0 | Status: DISCONTINUED | OUTPATIENT
Start: 2020-11-11 | End: 2020-11-17

## 2020-11-11 RX ORDER — INSULIN LISPRO 100/ML
10 VIAL (ML) SUBCUTANEOUS
Refills: 0 | Status: DISCONTINUED | OUTPATIENT
Start: 2020-11-11 | End: 2020-11-15

## 2020-11-11 RX ORDER — METOCLOPRAMIDE HCL 10 MG
10 TABLET ORAL EVERY 8 HOURS
Refills: 0 | Status: COMPLETED | OUTPATIENT
Start: 2020-11-11 | End: 2020-11-12

## 2020-11-11 RX ORDER — INSULIN LISPRO 100/ML
VIAL (ML) SUBCUTANEOUS
Refills: 0 | Status: DISCONTINUED | OUTPATIENT
Start: 2020-11-11 | End: 2020-11-17

## 2020-11-11 RX ORDER — GLUCAGON INJECTION, SOLUTION 0.5 MG/.1ML
1 INJECTION, SOLUTION SUBCUTANEOUS ONCE
Refills: 0 | Status: DISCONTINUED | OUTPATIENT
Start: 2020-11-11 | End: 2020-11-17

## 2020-11-11 RX ADMIN — Medication 10 MILLIGRAM(S): at 00:40

## 2020-11-11 RX ADMIN — MUPIROCIN 1 APPLICATION(S): 20 OINTMENT TOPICAL at 17:01

## 2020-11-11 RX ADMIN — INSULIN GLARGINE 40 UNIT(S): 100 INJECTION, SOLUTION SUBCUTANEOUS at 22:17

## 2020-11-11 RX ADMIN — Medication 650 MILLIGRAM(S): at 18:04

## 2020-11-11 RX ADMIN — Medication 81 MILLIGRAM(S): at 11:56

## 2020-11-11 RX ADMIN — ENOXAPARIN SODIUM 40 MILLIGRAM(S): 100 INJECTION SUBCUTANEOUS at 11:57

## 2020-11-11 RX ADMIN — OXYCODONE HYDROCHLORIDE 5 MILLIGRAM(S): 5 TABLET ORAL at 22:17

## 2020-11-11 RX ADMIN — OXYCODONE HYDROCHLORIDE 5 MILLIGRAM(S): 5 TABLET ORAL at 12:26

## 2020-11-11 RX ADMIN — FAMOTIDINE 20 MILLIGRAM(S): 10 INJECTION INTRAVENOUS at 06:37

## 2020-11-11 RX ADMIN — OXYCODONE HYDROCHLORIDE 5 MILLIGRAM(S): 5 TABLET ORAL at 22:50

## 2020-11-11 RX ADMIN — MUPIROCIN 1 APPLICATION(S): 20 OINTMENT TOPICAL at 06:38

## 2020-11-11 RX ADMIN — Medication 10 MILLIGRAM(S): at 06:38

## 2020-11-11 RX ADMIN — FAMOTIDINE 20 MILLIGRAM(S): 10 INJECTION INTRAVENOUS at 17:02

## 2020-11-11 RX ADMIN — Medication 25 MILLIGRAM(S): at 13:43

## 2020-11-11 RX ADMIN — OXYCODONE HYDROCHLORIDE 5 MILLIGRAM(S): 5 TABLET ORAL at 11:56

## 2020-11-11 RX ADMIN — CHLORHEXIDINE GLUCONATE 1 APPLICATION(S): 213 SOLUTION TOPICAL at 06:38

## 2020-11-11 RX ADMIN — ATORVASTATIN CALCIUM 40 MILLIGRAM(S): 80 TABLET, FILM COATED ORAL at 22:17

## 2020-11-11 RX ADMIN — WARFARIN SODIUM 5 MILLIGRAM(S): 2.5 TABLET ORAL at 22:17

## 2020-11-11 RX ADMIN — Medication 650 MILLIGRAM(S): at 17:34

## 2020-11-11 RX ADMIN — Medication 10 MILLIGRAM(S): at 22:18

## 2020-11-11 RX ADMIN — Medication 10 MILLIGRAM(S): at 13:43

## 2020-11-11 RX ADMIN — POLYETHYLENE GLYCOL 3350 17 GRAM(S): 17 POWDER, FOR SOLUTION ORAL at 11:57

## 2020-11-11 RX ADMIN — Medication 25 MILLIGRAM(S): at 22:17

## 2020-11-11 RX ADMIN — Medication 25 MILLIGRAM(S): at 06:38

## 2020-11-11 RX ADMIN — Medication 10 UNIT(S): at 17:02

## 2020-11-11 RX ADMIN — Medication 10 UNIT(S): at 11:11

## 2020-11-11 RX ADMIN — Medication 1: at 17:01

## 2020-11-11 RX ADMIN — Medication 100 MILLIGRAM(S): at 03:30

## 2020-11-11 NOTE — CONSULT NOTE ADULT - PROBLEM SELECTOR RECOMMENDATION 3
Will order TFTs and FU.
-check A1c  -sliding scale insulin  -fingersticks TID AC+QHS  -diabetic diet
Renal following  Cr trending down, 1.89 today   Daily BUN/Cr to trend  Avoid further nephrotoxic agents   Continue care as per primary team

## 2020-11-11 NOTE — CONSULT NOTE ADULT - ASSESSMENT
Assessment  DMT2: 80y Female with DM T2 with hyperglycemia, A1C 7.5%, was on oral meds at home, now postop on IV insulin, blood sugars overall improving, insulin requirement fluctuating, no hypoglycemic episodes, eating meals.  CAD: s/p CABG, on medications, stable, monitored.  Hyperthyroidism: Patient has no history thyroid disease, was not on any thyroid supplements, TSH suppressed.          Dior Monroy MD  Cell: [phone]  Office: [phone]               Assessment  DMT2: 80y Female with DM T2 with hyperglycemia,  A1C 7.5%, was on oral meds at home, now postop on IV insulin, blood sugars overall improving, insulin requirement fluctuating, no hypoglycemic episodes, eating meals.  CAD: s/p CABG, on medications, stable, monitored.  Hyperthyroidism: Patient has no history thyroid disease, was not on any thyroid supplements, TSH suppressed.          Dior Monroy MD  Cell: [phone]  Office: [phone]

## 2020-11-11 NOTE — PROGRESS NOTE ADULT - SUBJECTIVE AND OBJECTIVE BOX
NEPHROLOGY-[facility] [phone]        Patient seen and examined         MEDICATIONS  (STANDING):  aspirin enteric coated 81 milliGRAM(s) Oral daily  aspirin Suppository 300 milliGRAM(s) Rectal once  atorvastatin 40 milliGRAM(s) Oral at bedtime  chlorhexidine 2% Cloths 1 Application(s) Topical <User Schedule>  dextrose 40% Gel 15 Gram(s) Oral once  dextrose 5%. 1000 milliLiter(s) (50 mL/Hr) IV Continuous <Continuous>  dextrose 5%. 1000 milliLiter(s) (100 mL/Hr) IV Continuous <Continuous>  dextrose 50% Injectable 25 Gram(s) IV Push once  dextrose 50% Injectable 12.5 Gram(s) IV Push once  dextrose 50% Injectable 25 Gram(s) IV Push once  enoxaparin Injectable 40 milliGRAM(s) SubCutaneous daily  famotidine Injectable 20 milliGRAM(s) IV Push every 12 hours  glucagon  Injectable 1 milliGRAM(s) IntraMuscular once  insulin lispro (ADMELOG) corrective regimen sliding scale   SubCutaneous three times a day before meals  insulin lispro (ADMELOG) corrective regimen sliding scale   SubCutaneous at bedtime  insulin lispro Injectable (ADMELOG) 10 Unit(s) SubCutaneous before breakfast  insulin lispro Injectable (ADMELOG) 10 Unit(s) SubCutaneous before lunch  insulin lispro Injectable (ADMELOG) 10 Unit(s) SubCutaneous before dinner  insulin regular Infusion 3 Unit(s)/Hr (3 mL/Hr) IV Continuous <Continuous>  metoclopramide Injectable 10 milliGRAM(s) IV Push every 8 hours  metoprolol tartrate 25 milliGRAM(s) Oral two times a day  mupirocin 2% Ointment 1 Application(s) Both Nostrils two times a day  polyethylene glycol 3350 17 Gram(s) Oral daily  sodium chloride 0.9%. 1000 milliLiter(s) (10 mL/Hr) IV Continuous <Continuous>      VITAL:  T(C): , Max: 36.9 (11-11-20 @ 03:11)  T(F): , Max: 98.5 (11-11-20 @ 03:11)  HR: 72 (11-11-20 @ 07:40)  BP: 131/59 (11-11-20 @ 06:13)  BP(mean): 85 (11-11-20 @ 06:13)  RR: 18 (11-11-20 @ 03:20)  SpO2: 100% (11-11-20 @ 07:40)  Wt(kg): --    I and O's:    11-10 @ 07:01  -  11-11 @ 07:00  --------------------------------------------------------  IN: 1449.5 mL / OUT: 1450 mL / NET: -0.5 mL    11-11 @ 07:01  -  11-11 @ 10:21  --------------------------------------------------------  IN: 362 mL / OUT: 80 mL / NET: 282 mL          PHYSICAL EXAM:    Constitutional: NAD  Neck:  No JVD  Respiratory: CTAB/L  Cardiovascular: S1 and S2  Gastrointestinal: BS+, soft, NT/ND  Extremities: No peripheral edema  Neurological: A/O x 3, no focal deficits  Psychiatric: Normal mood, normal affect  : No Dowling  Skin: No rashes  Access: Not applicable    LABS:                        10.6   27.52 )-----------( 244      ( 11 Nov 2020 06:35 )             34.4     11-11    135  |  102  |  38<H>  ----------------------------<  116<H>  4.4   |  22  |  1.47<H>    Ca    8.7      11 Nov 2020 06:35  Phos  3.6     11-11  Mg     2.5     11-11    TPro  5.1<L>  /  Alb  3.2<L>  /  TBili  0.6  /  DBili  x   /  AST  41<H>  /  ALT  19  /  AlkPhos  47  11-10          Urine Studies:          RADIOLOGY & ADDITIONAL STUDIES:             NEPHROLOGY-[facility] [phone]        Patient seen and examined in bed.  She was in good spirits         MEDICATIONS  (STANDING):  aspirin enteric coated 81 milliGRAM(s) Oral daily  aspirin Suppository 300 milliGRAM(s) Rectal once  atorvastatin 40 milliGRAM(s) Oral at bedtime  chlorhexidine 2% Cloths 1 Application(s) Topical <User Schedule>  dextrose 40% Gel 15 Gram(s) Oral once  dextrose 5%. 1000 milliLiter(s) (50 mL/Hr) IV Continuous <Continuous>  dextrose 5%. 1000 milliLiter(s) (100 mL/Hr) IV Continuous <Continuous>  dextrose 50% Injectable 25 Gram(s) IV Push once  dextrose 50% Injectable 12.5 Gram(s) IV Push once  dextrose 50% Injectable 25 Gram(s) IV Push once  enoxaparin Injectable 40 milliGRAM(s) SubCutaneous daily  famotidine Injectable 20 milliGRAM(s) IV Push every 12 hours  glucagon  Injectable 1 milliGRAM(s) IntraMuscular once  insulin lispro (ADMELOG) corrective regimen sliding scale   SubCutaneous three times a day before meals  insulin lispro (ADMELOG) corrective regimen sliding scale   SubCutaneous at bedtime  insulin lispro Injectable (ADMELOG) 10 Unit(s) SubCutaneous before breakfast  insulin lispro Injectable (ADMELOG) 10 Unit(s) SubCutaneous before lunch  insulin lispro Injectable (ADMELOG) 10 Unit(s) SubCutaneous before dinner  insulin regular Infusion 3 Unit(s)/Hr (3 mL/Hr) IV Continuous <Continuous>  metoclopramide Injectable 10 milliGRAM(s) IV Push every 8 hours  metoprolol tartrate 25 milliGRAM(s) Oral two times a day  mupirocin 2% Ointment 1 Application(s) Both Nostrils two times a day  polyethylene glycol 3350 17 Gram(s) Oral daily  sodium chloride 0.9%. 1000 milliLiter(s) (10 mL/Hr) IV Continuous <Continuous>      VITAL:  T(C): , Max: 36.9 (11-11-20 @ 03:11)  T(F): , Max: 98.5 (11-11-20 @ 03:11)  HR: 72 (11-11-20 @ 07:40)  BP: 131/59 (11-11-20 @ 06:13)  BP(mean): 85 (11-11-20 @ 06:13)  RR: 18 (11-11-20 @ 03:20)  SpO2: 100% (11-11-20 @ 07:40)  Wt(kg): --    I and O's:    11-10 @ 07:01  -  11-11 @ 07:00  --------------------------------------------------------  IN: 1449.5 mL / OUT: 1450 mL / NET: -0.5 mL    11-11 @ 07:01  -  11-11 @ 10:21  --------------------------------------------------------  IN: 362 mL / OUT: 80 mL / NET: 282 mL          PHYSICAL EXAM:    Constitutional: NAD  Neck:  No JVD  Respiratory: CTAB/L  Cardiovascular: S1 and S2  Gastrointestinal: BS+, soft, NT/ND  Extremities: + peripheral edema  Swollen right hand   Neurological: A/O x 3, no focal deficits  Psychiatric: Normal mood, normal affect  : +  Dowling  Skin: No rashes  Access: Not applicable    LABS:                        10.6   27.52 )-----------( 244      ( 11 Nov 2020 06:35 )             34.4     11-11    135  |  102  |  38<H>  ----------------------------<  116<H>  4.4   |  22  |  1.47<H>    Ca    8.7      11 Nov 2020 06:35  Phos  3.6     11-11  Mg     2.5     11-11    TPro  5.1<L>  /  Alb  3.2<L>  /  TBili  0.6  /  DBili  x   /  AST  41<H>  /  ALT  19  /  AlkPhos  47  11-10          Urine Studies:          RADIOLOGY & ADDITIONAL STUDIES:

## 2020-11-11 NOTE — CONSULT NOTE ADULT - PROBLEM SELECTOR RECOMMENDATION 2
On medications,  no chest pain, stable, monitored and followed up by primary cardiothoracic team/cardiology team.
-BNP elevated, history of diastolic failure, on bumex at home  -cardiology consult for further recs, can give 1 dose of bumex 2mg IV now   -monitor strict ins and outs   -daily weights   -ischemic workup as above, if recommended by cards
Currently uncontrolled   Recommend tight glycemic control for optimization prior to OR w/ goal FS glucose < 200  Continue care as per primary team

## 2020-11-11 NOTE — PROGRESS NOTE ADULT - SUBJECTIVE AND OBJECTIVE BOX
CARDIOTHORACIC SURGERY DAILY PROGRESS NOTE  NURIA GARCIA | [id number] | [facility]CO[facility] 275 D1    PATIENT SEEN AT 11-11-20 @ 08:56    Subjective: Spoke to pt with  ID# [id number]. Patient is resting well in her chair this AM. No acute events overnight. Pt admits to mild incisional CP that is improving. Patient denies fever/chills, SOB, nausea, vomiting, and abd pain. No other concerns this AM.    Patient seen and examined at bedside.     Vital Signs:  Vital Signs Last 24 Hrs  T(C): 36.9 (11-11-20 @ 07:40), Max: 36.9 (11-11-20 @ 03:11)  T(F): 98.4 (11-11-20 @ 07:40), Max: 98.5 (11-11-20 @ 03:11)  HR: 72 (11-11-20 @ 07:40) (64 - 82)  BP: 131/59 (11-11-20 @ 06:13) (115/55 - 167/79)  RR: 18 (11-11-20 @ 03:20) (15 - 26)  SpO2: 100% (11-11-20 @ 07:40) (97% - 100%) on (O2)    TELEMETRY:     11-10-20 @ 07:01  -  11-11-20 @ 07:00  --------------------------------------------------------  IN:    Insulin: 75.5 mL    IV PiggyBack: 590 mL    Oral Fluid: 564 mL    sodium chloride 0.9%: 220 mL  Total IN: 1449.5 mL    OUT:    Chest Tube (mL): 60 mL    Chest Tube (mL): 150 mL    Ureteral Catheter (mL): 1240 mL  Total OUT: 1450 mL    Total NET: -0.5 mL      11-11-20 @ 07:01  -  11-11-20 @ 08:56  --------------------------------------------------------  IN:    Insulin: 7 mL    sodium chloride 0.9%: 20 mL  Total IN: 27 mL    OUT:    Chest Tube (mL): 0 mL    Chest Tube (mL): 0 mL    Ureteral Catheter (mL): 80 mL  Total OUT: 80 mL    Total NET: -53 mL        PHYSICAL EXAM:  General: WN/WD NAD  Neurology: Awake, nonfocal, CUEVAS x 4  Eyes: Scleras clear, PERRLA/ EOMI, Gross vision intact  ENT: Gross hearing intact, grossly patent pharynx, no stridor  Neck: Neck supple, trachea midline, No JVD,   Respiratory: CTA B/L, No wheezing, rales, rhonchi  CV: ventricular pacing wires in place, SR, S1S2, no murmurs, rubs or gallops  Chest: stable chest, sternal dressing c/d/i, infrasternal dressing with mild strikethrough  Abdominal: Soft, NT, ND +BS,   Extremities: Mild to moderate BLE peripheral edema, + peripheral pulses, RLE dressing is c/d/i  Skin: No Rashes, Hematoma, Ecchymosis  Lymphatic: No Neck, axilla, groin LAD  Psych: Oriented x 3, normal affect  Tubes: L pleural 60cc per 24 hours, meds CT 150cc per 24 hours    Relevant labs, radiology and Medications reviewed                          10.6   27.52 )-----------( 244      ( 11 Nov 2020 06:35 )             34.4     11-11    135  |  102  |  38<H>  ----------------------------<  116<H>  4.4   |  22  |  1.47<H>    Ca    8.7      11 Nov 2020 06:35  Phos  3.6     11-11  Mg     2.5     11-11    TPro  5.1<L>  /  Alb  3.2<L>  /  TBili  0.6  /  DBili  x   /  AST  41<H>  /  ALT  19  /  AlkPhos  47  11-10    PT/INR - ( 11 Nov 2020 06:35 )   PT: 10.9 sec;   INR: 0.90 ratio         PTT - ( 11 Nov 2020 06:35 )  PTT:25.7 sec            MEDICATIONS  (STANDING):  aspirin enteric coated 81 milliGRAM(s) Oral daily  aspirin Suppository 300 milliGRAM(s) Rectal once  atorvastatin 40 milliGRAM(s) Oral at bedtime  chlorhexidine 2% Cloths 1 Application(s) Topical <User Schedule>  enoxaparin Injectable 40 milliGRAM(s) SubCutaneous daily  famotidine Injectable 20 milliGRAM(s) IV Push every 12 hours  insulin lispro (ADMELOG) corrective regimen sliding scale   SubCutaneous three times a day before meals  insulin lispro (ADMELOG) corrective regimen sliding scale   SubCutaneous at bedtime  insulin regular Infusion 3 Unit(s)/Hr (3 mL/Hr) IV Continuous <Continuous>  metoclopramide Injectable 10 milliGRAM(s) IV Push every 8 hours  metoprolol tartrate 25 milliGRAM(s) Oral two times a day  mupirocin 2% Ointment 1 Application(s) Both Nostrils two times a day  polyethylene glycol 3350 17 Gram(s) Oral daily  sodium chloride 0.9%. 1000 milliLiter(s) (10 mL/Hr) IV Continuous <Continuous>    MEDICATIONS  (PRN):  acetaminophen   Tablet .. 650 milliGRAM(s) Oral every 6 hours PRN Temp greater or equal to 38.5C (101.3F), Mild Pain (1 - 3)  oxyCODONE    IR 5 milliGRAM(s) Oral every 6 hours PRN Moderate Pain (4 - 6)    Pt was discussed with Dr. Heath

## 2020-11-11 NOTE — PROGRESS NOTE ADULT - SUBJECTIVE AND OBJECTIVE BOX
Subjective: Patient seen and examined. No new events except as noted.     SUBJECTIVE/ROS:  feels ok       MEDICATIONS:  MEDICATIONS  (STANDING):  aspirin enteric coated 81 milliGRAM(s) Oral daily  aspirin Suppository 300 milliGRAM(s) Rectal once  atorvastatin 40 milliGRAM(s) Oral at bedtime  chlorhexidine 2% Cloths 1 Application(s) Topical <User Schedule>  enoxaparin Injectable 40 milliGRAM(s) SubCutaneous daily  famotidine Injectable 20 milliGRAM(s) IV Push every 12 hours  insulin lispro (ADMELOG) corrective regimen sliding scale   SubCutaneous three times a day before meals  insulin lispro (ADMELOG) corrective regimen sliding scale   SubCutaneous at bedtime  insulin regular Infusion 3 Unit(s)/Hr (3 mL/Hr) IV Continuous <Continuous>  metoclopramide Injectable 10 milliGRAM(s) IV Push every 8 hours  metoprolol tartrate 25 milliGRAM(s) Oral two times a day  mupirocin 2% Ointment 1 Application(s) Both Nostrils two times a day  polyethylene glycol 3350 17 Gram(s) Oral daily  sodium chloride 0.9%. 1000 milliLiter(s) (10 mL/Hr) IV Continuous <Continuous>      PHYSICAL EXAM:  T(C): 36.8 (11-11-20 @ 03:20), Max: 36.9 (11-11-20 @ 03:11)  HR: 82 (11-11-20 @ 06:13) (59 - 82)  BP: 131/59 (11-11-20 @ 06:13) (115/55 - 167/79)  RR: 18 (11-11-20 @ 03:20) (15 - 26)  SpO2: 100% (11-11-20 @ 03:20) (97% - 100%)  Wt(kg): --  I&O's Summary    10 Nov 2020 07:01  -  11 Nov 2020 07:00  --------------------------------------------------------  IN: 1449.5 mL / OUT: 1410 mL / NET: 39.5 mL            JVP: Normal  Neck: supple  Lung: clear   CV: S1 S2 , Murmur:  Abd: soft  Ext: No edema  neuro: Awake / alert  Psych: flat affect  Skin: normal``    LABS/DATA:    CARDIAC MARKERS:  CARDIAC MARKERS ( 09 Nov 2020 19:58 )  x     / x     / 221 U/L / x     / 30.7 ng/mL                                10.6   27.52 )-----------( 244      ( 11 Nov 2020 06:35 )             34.4     11-10    140  |  105  |  34<H>  ----------------------------<  125<H>  4.6   |  24  |  1.32<H>    Ca    8.3<L>      10 Nov 2020 02:00  Phos  2.3     11-10  Mg     2.8     11-10    TPro  5.1<L>  /  Alb  3.2<L>  /  TBili  0.6  /  DBili  x   /  AST  41<H>  /  ALT  19  /  AlkPhos  47  11-10    proBNP:   Lipid Profile:   HgA1c:   TSH:     TELE:  EKG:

## 2020-11-11 NOTE — PROGRESS NOTE ADULT - PROBLEM SELECTOR PLAN 1
Transferred to step down  Pain management  Encourage incentive spirometry and ambulation as tolerated  Continue Lovenox, ASA/statin  Continue Lopressor 25 BID  Will remove chest tubes today

## 2020-11-11 NOTE — CONSULT NOTE ADULT - SUBJECTIVE AND OBJECTIVE BOX
HPI:  Pt is a 80y Amharic speaking Female presenting with R hip pain s/p mechanical fall 3 weeks ago. Pt interviewed with Amharic . Discussed case with daughter Shauna as well. Pt has been walking since her fall 3 weeks ago with consistent pain. Pt is able to bear weight and walks with assistive devices. Pt presented today to ED for shortness of breath and is being admitted for medical workup. Of note, patient has R TKA done several years ago but cannot remember name of surgeon. Pt is a community ambulator at baseline. Pt denies numbness, tingling, or paresthesias in affected limb. Pt denies headstrike or LOC and denies any other orthopaedic injuries at this time. Pt takes Eliquis 5mg BID for previous DVT but is unsure when her last dose was. Denies fevers, dizziness, CP, SOB, N/V, calf pain.    ROS: 10 point review of systems otherwise negative unless noted in HPI    PMH:  Renal insufficiency    Elevated blood pressure reading in office with diagnosis of hypertension    DVT of leg (deep venous thrombosis)    CHF (congestive heart failure)    Type II diabetes mellitus    Obese    S/P radiation &gt; 12 weeks    S/P chemotherapy, time since greater than 12 weeks    Rectal cancer    Hyperlipidemia    Lymphedema of arm    Diabetes mellitus    HTN (hypertension)    Breast CA, right    Rectal cancer    Cancer    Lymphedema of arm    Colon cancer    Breast CA, right    Obesity (BMI 35.0-39.9 without comorbidity)    Hyperlipidemia    HTN (hypertension)    DM (diabetes mellitus)      PSH:  S/P TKR (total knee replacement), right    S/P colon resection    S/P ileostomy    History of appendectomy    S/P mastectomy, right    S/P colectomy    History of tonsillectomy    H/O mastectomy, right      AH:  penicillin (Angioedema)  CT scan dyes (hives)    Meds: See med rec    T(C): 37 (20 @ 06:53)  HR: 88 (20 @ 06:53)  BP: 160/71 (20 @ 06:53)  RR: 18 (20 @ 06:53)  SpO2: 97% (20 @ 06:53)  Wt(kg): --                        12.6   6.33  )-----------( 277      ( 2020 05:56 )             40.7         135  |  97  |  23  ----------------------------<  221<H>  4.8   |  23  |  1.46<H>    Ca    9.6      2020 05:56    TPro  7.4  /  Alb  3.9  /  TBili  0.4  /  DBili  x   /  AST  31  /  ALT  14  /  AlkPhos  79  11-02    PT/INR - ( 2020 05:56 )   PT: 13.1 sec;   INR: 1.10 ratio         PTT - ( 2020 05:56 )  PTT:31.4 sec  Urinalysis Basic - ( 2020 00:20 )    Color: Yellow / Appearance: Clear / S.018 / pH: x  Gluc: x / Ketone: Negative  / Bili: Negative / Urobili: Negative   Blood: x / Protein: 100 mg/dL / Nitrite: Negative   Leuk Esterase: Negative / RBC: 1 /hpf / WBC 2 /HPF   Sq Epi: x / Non Sq Epi: 1 /hpf / Bacteria: Negative          Physical Exam:  Gen: NAD, AAOx3, Amharic speaking     RLE:   Skin intact  No edema, erythema, ecchymosis  No gross deformity  No bony TTP of Knee/Foot/Toes  Unable to SLR  Minimal pain with logroll/heelstrike  +EHL/FHL/TA/GS  SILT L2-S1  +DP/PT Pulses  Compartments soft and compressible  No calf TTP B/L    Secondary Assessment:  NC/AT, NTTP of clavicles, NTTP of C-,T-,L-Spine, NTTP of Pelvis  UEs: NTTP of Shoulders, Elbows, Wrists, Hands; NT with AROM/PROM of Shoulders, Elbows, Wrists, Hands; AIN/PIN/Med/Uln/Msc/Rad/Ax intact  LLE: Able to SLR, NT with Log Roll, NT with Heel Strike, NTTP of Hip, Knee, Ankle, Foot; NT with AROM/PROM of Hip, Knee, Ankle, Foot; Q/H/Gsc/TA/EHL/FHL intact    Imaging:  XR demonstrating R hip osteoarthritis  CT and MRI of R hip shows osteoarthritis of the R hip    Pt is a 80y Female with right hip osteoarthritis exacerbation; rule out hip fracture. MRI shows no acute fractures of the right hip.     -Admit to ortho under Dr. Guerrero  -Pain control  -WBAT RLE with ambulatory assist devices as needed  -Please document medical clearance for OR   -NPO with IVF on Wednesday for OR on 20  -SCDs  -Will discuss with Dr. Guerrero and advise as plan changes   (2020 12:35)  Patient has history of diabetes, A1C 7.5%, on oral meds at home, follows up with PCP for diabetes management.  Endo was consulted for postoperative glycemic control.    PAST MEDICAL & SURGICAL HISTORY:  Renal insufficiency    DVT of leg (deep venous thrombosis)  right calf DVT  2019 pt on Eliquis    CHF (congestive heart failure)    Type II diabetes mellitus    Obese    Rectal cancer  s/p chemotherapy and  radiation 12 weeks 2015 -3/2015    Hyperlipidemia    Lymphedema of arm  right arm s/p mastectomy    HTN (hypertension)    Breast CA, right   s/p mastectomy ,IV Chemotherapy    S/P TKR (total knee replacement), right      S/P colon resection      S/P ileostomy  low anterior resection-8/10/15, reversal of ileostomy     History of appendectomy      S/P mastectomy, right          FAMILY HISTORY:  Family history of heart attack (Child)    Family history of diabetes mellitus in son    Family history of diabetes mellitus in mother        Social History:    Outpatient Medications:    MEDICATIONS  (STANDING):  aspirin enteric coated 81 milliGRAM(s) Oral daily  aspirin Suppository 300 milliGRAM(s) Rectal once  atorvastatin 40 milliGRAM(s) Oral at bedtime  chlorhexidine 2% Cloths 1 Application(s) Topical <User Schedule>  dextrose 40% Gel 15 Gram(s) Oral once  dextrose 40% Gel 15 Gram(s) Oral once  dextrose 5%. 1000 milliLiter(s) (50 mL/Hr) IV Continuous <Continuous>  dextrose 5%. 1000 milliLiter(s) (100 mL/Hr) IV Continuous <Continuous>  dextrose 50% Injectable 25 Gram(s) IV Push once  dextrose 50% Injectable 12.5 Gram(s) IV Push once  dextrose 50% Injectable 25 Gram(s) IV Push once  enoxaparin Injectable 40 milliGRAM(s) SubCutaneous daily  famotidine Injectable 20 milliGRAM(s) IV Push every 12 hours  glucagon  Injectable 1 milliGRAM(s) IntraMuscular once  insulin glargine Injectable (LANTUS) 40 Unit(s) SubCutaneous at bedtime  insulin lispro (ADMELOG) corrective regimen sliding scale   SubCutaneous three times a day before meals  insulin lispro (ADMELOG) corrective regimen sliding scale   SubCutaneous at bedtime  insulin lispro Injectable (ADMELOG) 10 Unit(s) SubCutaneous three times a day before meals  metoclopramide Injectable 10 milliGRAM(s) IV Push every 8 hours  metoprolol tartrate 25 milliGRAM(s) Oral three times a day  mupirocin 2% Ointment 1 Application(s) Both Nostrils two times a day  polyethylene glycol 3350 17 Gram(s) Oral daily  sodium chloride 0.9%. 1000 milliLiter(s) (10 mL/Hr) IV Continuous <Continuous>    MEDICATIONS  (PRN):  acetaminophen   Tablet .. 650 milliGRAM(s) Oral every 6 hours PRN Temp greater or equal to 38.5C (101.3F), Mild Pain (1 - 3)  oxyCODONE    IR 5 milliGRAM(s) Oral every 6 hours PRN Moderate Pain (4 - 6)      Allergies    CT scan dye (Hives)  penicillin (Angioedema)    Intolerances      Review of Systems:  Constitutional: No fever, no chills  Eyes: No blurry vision  Neuro: No tremors  HEENT: No pain, no neck swelling  Cardiovascular: No chest pain, no palpitations  Respiratory: Has SOB, no cough  GI: No nausea, vomiting, abdominal pain  : No dysuria  Skin: no rash  MSK: Has leg swelling.  Psych: no depression  Endocrine: no polyuria, polydipsia    ALL OTHER SYSTEMS REVIEWED AND NEGATIVE    UNABLE TO OBTAIN    PHYSICAL EXAM:  VITALS: T(C): 37 (20 @ 12:04)  T(F): 98.6 (20 @ 12:04), Max: 98.6 (20 @ 12:04)  HR: 82 (20 @ 13:44) (69 - 82)  BP: 144/65 (20 @ 13:44) (118/59 - 167/79)  RR:  (17 - 18)  SpO2:  (97% - 100%)  Wt(kg): --  GENERAL: NAD, well-groomed, well-developed  EYES: No proptosis, no lid lag  HEENT:  Atraumatic, Normocephalic  THYROID: Normal size, no palpable nodules  RESPIRATORY: Clear to auscultation bilaterally; No rales, rhonchi, wheezing  CARDIOVASCULAR: Si S2, No murmurs;  GI: Soft, non distended, normal bowel sounds  SKIN: Dry, intact, No rashes or lesions  MUSCULOSKELETAL: Has BL lower extremity edema.  NEURO:  no tremor, sensation decreased in feet BL,    POCT Blood Glucose.: 126 mg/dL (20 @ 14:04)  POCT Blood Glucose.: 146 mg/dL (20 @ 13:05)  POCT Blood Glucose.: 177 mg/dL (20 @ 12:06)  POCT Blood Glucose.: 256 mg/dL (20 @ 10:58)  POCT Blood Glucose.: 245 mg/dL (20 @ 09:55)  POCT Blood Glucose.: 235 mg/dL (20 @ 09:03)  POCT Blood Glucose.: 150 mg/dL (20 @ 08:09)  POCT Blood Glucose.: 140 mg/dL (20 @ 07:11)  POCT Blood Glucose.: 119 mg/dL (20 @ 06:21)  POCT Blood Glucose.: 149 mg/dL (20 @ 05:13)  POCT Blood Glucose.: 199 mg/dL (20 @ 04:00)  POCT Blood Glucose.: 186 mg/dL (20 @ 03:08)  POCT Blood Glucose.: 197 mg/dL (20 @ 01:30)  POCT Blood Glucose.: 119 mg/dL (11-10-20 @ 22:20)  POCT Blood Glucose.: 82 mg/dL (11-10-20 @ 21:19)  POCT Blood Glucose.: 105 mg/dL (11-10-20 @ 20:17)  POCT Blood Glucose.: 139 mg/dL (11-10-20 @ 18:37)  POCT Blood Glucose.: 133 mg/dL (11-10-20 @ 17:37)  POCT Blood Glucose.: 159 mg/dL (11-10-20 @ 16:38)  POCT Blood Glucose.: 160 mg/dL (11-10-20 @ 15:30)  POCT Blood Glucose.: 182 mg/dL (11-10-20 @ 14:28)  POCT Blood Glucose.: 179 mg/dL (11-10-20 @ 13:26)  POCT Blood Glucose.: 200 mg/dL (11-10-20 @ 12:05)  POCT Blood Glucose.: 176 mg/dL (11-10-20 @ 10:58)  POCT Blood Glucose.: 151 mg/dL (11-10-20 @ 10:26)  POCT Blood Glucose.: 120 mg/dL (11-10-20 @ 09:28)  POCT Blood Glucose.: 125 mg/dL (11-10-20 @ 08:20)  POCT Blood Glucose.: 129 mg/dL (11-10-20 @ 06:51)  POCT Blood Glucose.: 131 mg/dL (11-10-20 @ 06:08)  POCT Blood Glucose.: 115 mg/dL (11-10-20 @ 02:51)  POCT Blood Glucose.: 111 mg/dL (11-10-20 @ 01:55)  POCT Blood Glucose.: 120 mg/dL (11-10-20 @ 01:09)  POCT Blood Glucose.: 169 mg/dL (20 @ 21:55)  POCT Blood Glucose.: 192 mg/dL (20 @ 11:53)  POCT Blood Glucose.: 194 mg/dL (20 @ 07:53)  POCT Blood Glucose.: 210 mg/dL (20 @ 21:59)  POCT Blood Glucose.: 156 mg/dL (20 @ 17:09)                            10.6   27.52 )-----------( 244      ( 2020 06:35 )             34.4           135  |  102  |  38<H>  ----------------------------<  116<H>  4.4   |  22  |  1.47<H>    EGFR if : 38<L>  EGFR if non : 33<L>    Ca    8.7        Mg     2.5       Phos  3.6         TPro  5.1<L>  /  Alb  3.2<L>  /  TBili  0.6  /  DBili  x   /  AST  41<H>  /  ALT  19  /  AlkPhos  47  11-10      Thyroid Function Tests:   @ 09:59 TSH 0.21 FreeT4 -- T3 56 Anti TPO -- Anti Thyroglobulin Ab -- TSI --   @ 01:53 TSH 0.22 FreeT4 -- T3 57 Anti TPO -- Anti Thyroglobulin Ab -- TSI --           Chol 155 LDL -- HDL 55 Trig 136,  Chol 152 LDL -- HDL 49<L> Trig 123    Radiology:                    HPI:  Pt is a 80y Kazakh speaking Female presenting with R hip pain s/p mechanical fall 3 weeks ago. Pt interviewed with Kazakh . Discussed case with daughter Shauna as well. Pt has been walking since her fall 3 weeks ago with consistent pain. Pt is able to bear weight and walks with assistive devices. Pt presented today to ED for shortness of breath and is being admitted for medical workup. Of note, patient has R TKA done several years ago but cannot remember name of surgeon. Pt is a community ambulator at baseline. Pt denies numbness, tingling, or paresthesias in affected limb. Pt denies headstrike or LOC and denies any other orthopaedic injuries at this time. Pt takes Eliquis 5mg BID for previous DVT but is unsure when her last dose was. Denies fevers, dizziness, CP, SOB, N/V, calf pain.    ROS: 10 point review of systems otherwise negative unless noted in HPI    PMH:  Renal insufficiency    Elevated blood pressure reading in  office with diagnosis of hypertension    DVT of leg (deep venous thrombosis)    CHF (congestive heart failure)    Type II diabetes mellitus    Obese    S/P radiation &gt; 12 weeks    S/P chemotherapy, time since greater than 12 weeks    Rectal cancer    Hyperlipidemia    Lymphedema of arm    Diabetes mellitus    HTN (hypertension)    Breast CA, right    Rectal cancer    Cancer    Lymphedema of arm    Colon cancer    Breast CA, right    Obesity (BMI 35.0-39.9 without comorbidity)    Hyperlipidemia    HTN (hypertension)    DM (diabetes mellitus)      PSH:  S/P TKR (total knee replacement), right    S/P colon resection    S/P ileostomy    History of appendectomy    S/P mastectomy, right    S/P colectomy    History of tonsillectomy    H/O mastectomy, right      AH:  penicillin (Angioedema)  CT scan dyes (hives)    Meds: See med rec    T(C): 37 (20 @ 06:53)  HR: 88 (20 @ 06:53)  BP: 160/71 (20 @ 06:53)  RR: 18 (20 @ 06:53)  SpO2: 97% (20 @ 06:53)  Wt(kg): --                        12.6   6.33  )-----------( 277      ( 2020 05:56 )             40.7         135  |  97  |  23  ----------------------------<  221<H>  4.8   |  23  |  1.46<H>    Ca    9.6      2020 05:56    TPro  7.4  /  Alb  3.9  /  TBili  0.4  /  DBili  x   /  AST  31  /  ALT  14  /  AlkPhos  79  11-02    PT/INR - ( 2020 05:56 )   PT: 13.1 sec;   INR: 1.10 ratio         PTT - ( 2020 05:56 )  PTT:31.4 sec  Urinalysis Basic - ( 2020 00:20 )    Color: Yellow / Appearance: Clear / S.018 / pH: x  Gluc: x / Ketone: Negative  / Bili: Negative / Urobili: Negative   Blood: x / Protein: 100 mg/dL / Nitrite: Negative   Leuk Esterase: Negative / RBC: 1 /hpf / WBC 2 /HPF   Sq Epi: x / Non Sq Epi: 1 /hpf / Bacteria: Negative          Physical Exam:  Gen: NAD, AAOx3, Kazakh speaking     RLE:   Skin intact  No edema, erythema, ecchymosis  No gross deformity  No bony TTP of Knee/Foot/Toes  Unable to SLR  Minimal pain with logroll/heelstrike  +EHL/FHL/TA/GS  SILT L2-S1  +DP/PT Pulses  Compartments soft and compressible  No calf TTP B/L    Secondary Assessment:  NC/AT, NTTP of clavicles, NTTP of C-,T-,L-Spine, NTTP of Pelvis  UEs: NTTP of Shoulders, Elbows, Wrists, Hands; NT with AROM/PROM of Shoulders, Elbows, Wrists, Hands; AIN/PIN/Med/Uln/Msc/Rad/Ax intact  LLE: Able to SLR, NT with Log Roll, NT with Heel Strike, NTTP of Hip, Knee, Ankle, Foot; NT with AROM/PROM of Hip, Knee, Ankle, Foot; Q/H/Gsc/TA/EHL/FHL intact    Imaging:  XR demonstrating R hip osteoarthritis  CT and MRI of R hip shows osteoarthritis of the R hip    Pt is a 80y Female with right hip osteoarthritis exacerbation; rule out hip fracture. MRI shows no acute fractures of the right hip.     -Admit to ortho under Dr. Guerrero  -Pain control  -WBAT RLE with ambulatory assist devices as needed  -Please document medical clearance for OR   -NPO with IVF on Wednesday for OR on 20  -SCDs  -Will discuss with Dr. Guerrero and advise as plan changes   (2020 12:35)  Patient has history of diabetes, A1C 7.5%, on oral meds at home, follows up with PCP for diabetes management.  Endo was consulted for postoperative glycemic control.    PAST MEDICAL & SURGICAL HISTORY:  Renal insufficiency    DVT of leg (deep venous thrombosis)  right calf DVT  2019 pt on Eliquis    CHF (congestive heart failure)    Type II diabetes mellitus    Obese    Rectal cancer  s/p chemotherapy and  radiation 12 weeks 2015 -3/2015    Hyperlipidemia    Lymphedema of arm  right arm s/p mastectomy    HTN (hypertension)    Breast CA, right   s/p mastectomy ,IV Chemotherapy    S/P TKR (total knee replacement), right      S/P colon resection      S/P ileostomy  low anterior resection-8/10/15, reversal of ileostomy     History of appendectomy      S/P mastectomy, right          FAMILY HISTORY:  Family history of heart attack (Child)    Family history of diabetes mellitus in son    Family history of diabetes mellitus in mother        Social History:    Outpatient Medications:    MEDICATIONS  (STANDING):  aspirin enteric coated 81 milliGRAM(s) Oral daily  aspirin Suppository 300 milliGRAM(s) Rectal once  atorvastatin 40 milliGRAM(s) Oral at bedtime  chlorhexidine 2% Cloths 1 Application(s) Topical <User Schedule>  dextrose 40% Gel 15 Gram(s) Oral once  dextrose 40% Gel 15 Gram(s) Oral once  dextrose 5%. 1000 milliLiter(s) (50 mL/Hr) IV Continuous <Continuous>  dextrose 5%. 1000 milliLiter(s) (100 mL/Hr) IV Continuous <Continuous>  dextrose 50% Injectable 25 Gram(s) IV Push once  dextrose 50% Injectable 12.5 Gram(s) IV Push once  dextrose 50% Injectable 25 Gram(s) IV Push once  enoxaparin Injectable 40 milliGRAM(s) SubCutaneous daily  famotidine Injectable 20 milliGRAM(s) IV Push every 12 hours  glucagon  Injectable 1 milliGRAM(s) IntraMuscular once  insulin glargine Injectable (LANTUS) 40 Unit(s) SubCutaneous at bedtime  insulin lispro (ADMELOG) corrective regimen sliding scale   SubCutaneous three times a day before meals  insulin lispro (ADMELOG) corrective regimen sliding scale   SubCutaneous at bedtime  insulin lispro Injectable (ADMELOG) 10 Unit(s) SubCutaneous three times a day before meals  metoclopramide Injectable 10 milliGRAM(s) IV Push every 8 hours  metoprolol tartrate 25 milliGRAM(s) Oral three times a day  mupirocin 2% Ointment 1 Application(s) Both Nostrils two times a day  polyethylene glycol 3350 17 Gram(s) Oral daily  sodium chloride 0.9%. 1000 milliLiter(s) (10 mL/Hr) IV Continuous <Continuous>    MEDICATIONS  (PRN):  acetaminophen   Tablet .. 650 milliGRAM(s) Oral every 6 hours PRN Temp greater or equal to 38.5C (101.3F), Mild Pain (1 - 3)  oxyCODONE    IR 5 milliGRAM(s) Oral every 6 hours PRN Moderate Pain (4 - 6)      Allergies    CT scan dye (Hives)  penicillin (Angioedema)    Intolerances      Review of Systems:  Constitutional: No fever, no chills  Eyes: No blurry vision  Neuro: No tremors  HEENT: No pain, no neck swelling  Cardiovascular: No chest pain, no palpitations  Respiratory: Has SOB, no cough  GI: No nausea, vomiting, abdominal pain  : No dysuria  Skin: no rash  MSK: Has leg swelling.  Psych: no depression  Endocrine: no polyuria, polydipsia    ALL OTHER SYSTEMS REVIEWED AND NEGATIVE    UNABLE TO OBTAIN    PHYSICAL EXAM:  VITALS: T(C): 37 (20 @ 12:04)  T(F): 98.6 (20 @ 12:04), Max: 98.6 (20 @ 12:04)  HR: 82 (20 @ 13:44) (69 - 82)  BP: 144/65 (20 @ 13:44) (118/59 - 167/79)  RR:  (17 - 18)  SpO2:  (97% - 100%)  Wt(kg): --  GENERAL: NAD, well-groomed, well-developed  EYES: No proptosis, no lid lag  HEENT:  Atraumatic, Normocephalic  THYROID: Normal size, no palpable nodules  RESPIRATORY: Clear to auscultation bilaterally; No rales, rhonchi, wheezing  CARDIOVASCULAR: Si S2, No murmurs;  GI: Soft, non distended, normal bowel sounds  SKIN: Dry, intact, No rashes or lesions  MUSCULOSKELETAL: Has BL lower extremity edema.  NEURO:  no tremor, sensation decreased in feet BL,    POCT Blood Glucose.: 126 mg/dL (20 @ 14:04)  POCT Blood Glucose.: 146 mg/dL (20 @ 13:05)  POCT Blood Glucose.: 177 mg/dL (20 @ 12:06)  POCT Blood Glucose.: 256 mg/dL (20 @ 10:58)  POCT Blood Glucose.: 245 mg/dL (20 @ 09:55)  POCT Blood Glucose.: 235 mg/dL (20 @ 09:03)  POCT Blood Glucose.: 150 mg/dL (20 @ 08:09)  POCT Blood Glucose.: 140 mg/dL (20 @ 07:11)  POCT Blood Glucose.: 119 mg/dL (20 @ 06:21)  POCT Blood Glucose.: 149 mg/dL (20 @ 05:13)  POCT Blood Glucose.: 199 mg/dL (20 @ 04:00)  POCT Blood Glucose.: 186 mg/dL (20 @ 03:08)  POCT Blood Glucose.: 197 mg/dL (20 @ 01:30)  POCT Blood Glucose.: 119 mg/dL (11-10-20 @ 22:20)  POCT Blood Glucose.: 82 mg/dL (11-10-20 @ 21:19)  POCT Blood Glucose.: 105 mg/dL (11-10-20 @ 20:17)  POCT Blood Glucose.: 139 mg/dL (11-10-20 @ 18:37)  POCT Blood Glucose.: 133 mg/dL (11-10-20 @ 17:37)  POCT Blood Glucose.: 159 mg/dL (11-10-20 @ 16:38)  POCT Blood Glucose.: 160 mg/dL (11-10-20 @ 15:30)  POCT Blood Glucose.: 182 mg/dL (11-10-20 @ 14:28)  POCT Blood Glucose.: 179 mg/dL (11-10-20 @ 13:26)  POCT Blood Glucose.: 200 mg/dL (11-10-20 @ 12:05)  POCT Blood Glucose.: 176 mg/dL (11-10-20 @ 10:58)  POCT Blood Glucose.: 151 mg/dL (11-10-20 @ 10:26)  POCT Blood Glucose.: 120 mg/dL (11-10-20 @ 09:28)  POCT Blood Glucose.: 125 mg/dL (11-10-20 @ 08:20)  POCT Blood Glucose.: 129 mg/dL (11-10-20 @ 06:51)  POCT Blood Glucose.: 131 mg/dL (11-10-20 @ 06:08)  POCT Blood Glucose.: 115 mg/dL (11-10-20 @ 02:51)  POCT Blood Glucose.: 111 mg/dL (11-10-20 @ 01:55)  POCT Blood Glucose.: 120 mg/dL (11-10-20 @ 01:09)  POCT Blood Glucose.: 169 mg/dL (20 @ 21:55)  POCT Blood Glucose.: 192 mg/dL (20 @ 11:53)  POCT Blood Glucose.: 194 mg/dL (20 @ 07:53)  POCT Blood Glucose.: 210 mg/dL (20 @ 21:59)  POCT Blood Glucose.: 156 mg/dL (20 @ 17:09)                            10.6   27.52 )-----------( 244      ( 2020 06:35 )             34.4           135  |  102  |  38<H>  ----------------------------<  116<H>  4.4   |  22  |  1.47<H>    EGFR if : 38<L>  EGFR if non : 33<L>    Ca    8.7        Mg     2.5       Phos  3.6         TPro  5.1<L>  /  Alb  3.2<L>  /  TBili  0.6  /  DBili  x   /  AST  41<H>  /  ALT  19  /  AlkPhos  47  11-10      Thyroid Function Tests:   @ 09:59 TSH 0.21 FreeT4 -- T3 56 Anti TPO -- Anti Thyroglobulin Ab -- TSI --   @ 01:53 TSH 0.22 FreeT4 -- T3 57 Anti TPO -- Anti Thyroglobulin Ab -- TSI --           Chol 155 LDL -- HDL 55 Trig 136,  Chol 152 LDL -- HDL 49<L> Trig 123    Radiology:

## 2020-11-11 NOTE — PROVIDER CONTACT NOTE (OTHER) - ASSESSMENT
Patient is alert and oriented. Denies SOB. Denies CP. Chest tube site free of kinks and line taped and secured. Chest tube is placed to suction per order. Drainage area marked. Vital signs stable. patient on 2LNC with spO2 99%.

## 2020-11-11 NOTE — CONSULT NOTE ADULT - CONSULT REASON
3VD
High Blood Sugars/DMT2
Pre-Operative Cardiac Risk Stratification and Optimization
Pre-operative Risk Stratification
R/o R hip fracture
CKD

## 2020-11-11 NOTE — CONSULT NOTE ADULT - CONSULT REQUESTED DATE/TIME
03-Nov-2020 05:40
03-Nov-2020 12:48
04-Nov-2020 06:48
05-Nov-2020 19:00
11-Nov-2020 14:31
04-Nov-2020 09:42

## 2020-11-11 NOTE — PROGRESS NOTE ADULT - ASSESSMENT
80F, Yoruba speaking, with a PMH of renal insufficiency, HTN, DVT (on Eliquis 5mg BID, unsure of last dose prior to admission), CHF, DMII, HLD, obesity, R breast cancer s/p mastectomy, colon cancer s/p resection, rectal cancer s/p chemo and radiation, lymphedema of arm, presenting with R hip pain s/p mechanical fall 3 weeks prior to admission. Discussed case with daughter Shauna as well. Pt has been walking since her fall 3 weeks ago with consistent pain. Pt is able to bear weight and walks with assistive devices. Pt presented to ED with shortness of breath as well. Admitted to orthopedic surgery, planned for total hip replacement if medically cleared. Cardiology stated that based on current ACC/AHA guidelines, patient history and physical exam, the patient is considered to have elevated risk given her abn stress test, sob and abn EKG, and recommended that pt needs cath prior to surgery. Cath showed 60% stenosis of pLAD, 90% stenosis of Cx and 90% stenosis of RCA. TTE showed normal LV function. CT surgery was consulted for CABG evaluation w/ Dr. Heath.    11/5: Pt seen and evaluated at bedside - NICO, NAD. Pt is amenable to CT surgery at this time. CT surgery pre-op workup in progress. D/w Dr. Heath - pt scheduled for OR Monday 11/9. Recommend initiating ASA 81 mg QD pre-op and continuation of Statin and beta blocker.  11/7: preop workup in progress; continue asa/ statin/b-blockers, mrsa nares- on bactroban, ck covid in am, OHS 11/9 with Dr. Heath   11/8: VSS, COVID PCR sent, lovenox dc'd. OR Mon 2nd case.   11/9: S/p CABG (LIMA to LAD, SVG to RCA)  11/10: Extubated successfully, transferred to step down. Continue Lovenox, ASA/statin, lopressor. Continue insulin gtt. PT recommending subacute rehab  11/11: Will administer warfarin 5mg. Cr uptrending to 1.47. Will remove both CT today. Will wean off insulin gtt today. 80F, Ukrainian speaking, with a PMH of renal insufficiency, HTN, DVT (on Eliquis 5mg BID, unsure of last dose prior to admission), CHF, DMII, HLD, obesity, R breast cancer s/p mastectomy, colon cancer s/p resection, rectal cancer s/p chemo and radiation, lymphedema of arm, presenting with R hip pain s/p mechanical fall 3 weeks prior to admission. Discussed case with daughter Shauna as well. Pt has been walking since her fall 3 weeks ago with consistent pain. Pt is able to bear weight and walks with assistive devices. Pt presented to ED with shortness of breath as well. Admitted to orthopedic surgery, planned for total hip replacement if medically cleared. Cardiology stated that based on current ACC/AHA guidelines, patient history and physical exam, the patient is considered to have elevated risk given her abn stress test, sob and abn EKG, and recommended that pt needs cath prior to surgery. Cath showed 60% stenosis of pLAD, 90% stenosis of Cx and 90% stenosis of RCA. TTE showed normal LV function. CT surgery was consulted for CABG evaluation w/ Dr. Heath.    11/5: Pt seen and evaluated at bedside - NICO, NAD. Pt is amenable to CT surgery at this time. CT surgery pre-op workup in progress. D/w Dr. Heath - pt scheduled for OR Monday 11/9. Recommend initiating ASA 81 mg QD pre-op and continuation of Statin and beta blocker.  11/7: preop workup in progress; continue asa/ statin/b-blockers, mrsa nares- on bactroban, ck covid in am, OHS 11/9 with Dr. Heath   11/8: VSS, COVID PCR sent, lovenox dc'd. OR Mon 2nd case.   11/9: S/p CABG (LIMA to LAD, SVG to RCA)  11/10: Extubated successfully, transferred to step down. Continue Lovenox, ASA/statin, lopressor. Continue insulin gtt. PT recommending subacute rehab  11/11: Will administer warfarin 5mg. Cr uptrending to 1.47. Will remove both CT today. Will wean off insulin gtt today.  Endo consult.  D/c chest tubes intro chen.  Hold on floor transfer 2/2 insulin infusion

## 2020-11-11 NOTE — CONSULT NOTE ADULT - PROBLEM SELECTOR RECOMMENDATION 9
Suggest to transition the following basal bolus insulin regimen.  Will start Lantus 40u at bedtime.  Will start Admelog 10u before each meal and continue Admelog correction scale coverage. Will continue monitoring FS and FU.
-RCRI Class IV risk based on risk factors  -Pt is high risk candidate for moderate risk procedure   -Per outpatient notes, patient was recommended to get cardiac cath before hip surgery by her Cardiologist, as there was concern for ischemia on her nuclear stress test which was abnormal. Would get cardiology consult by Dr. Haagn for further evaluation and cardiology clearance\  -further optimize fluid status as BNP is elevated and patient is dyspneic, would vike
CT surgery pre-op workup in progress   F/u TTE, PFTs, MRSA PCR and pre-op labs  Pt will need repeat COVID PCR 11/8 prior to OR   Scheduled for OR on Monday 11/9 for CABG w/ Dr. Heath  Continue care as per primary team

## 2020-11-11 NOTE — PROGRESS NOTE ADULT - ASSESSMENT
80F w/ HTN, DM2, CKD3a, and HFpEF, 11/2/20 a/w right hip fracture s/p mechanical fall    (1)Renal - stage 3b with non-nephrotic range proteinuria; due to DM/HTN. No evidence to date of TAO, s/p CABG POD # 2     (2)Ortho - right hip fracture - will have to wait at least 4 weeks post CABG for MARLA      RECOMMEND:  (1)  (2)Dose new meds for GFR 35-45ml/min (present dosing acceptable)  (3)          Sayed Stacey   [facility]  Office: [phone]       80F w/ HTN, DM2, CKD3a, and HFpEF, 11/2/20 a/w right hip fracture s/p mechanical fall    (1)Renal - stage 3b with non-nephrotic range proteinuria; due to DM/HTN. No evidence to date of TAO, s/p CABG POD # 2     (2)Ortho - right hip fracture -        RECOMMEND:  (1)No renal objection to lasix at present   (2)Dose new meds for GFR 35-45ml/min (present dosing acceptable)  (3)Chest tube to be removed at present       Sayed Stacey   [facility]  Office: [phone]

## 2020-11-12 DIAGNOSIS — Z29.9 ENCOUNTER FOR PROPHYLACTIC MEASURES, UNSPECIFIED: ICD-10-CM

## 2020-11-12 DIAGNOSIS — D72.829 ELEVATED WHITE BLOOD CELL COUNT, UNSPECIFIED: ICD-10-CM

## 2020-11-12 LAB
ANION GAP SERPL CALC-SCNC: 12 MMOL/L — SIGNIFICANT CHANGE UP (ref 5–17)
APPEARANCE UR: ABNORMAL
APTT BLD: 28.8 SEC — SIGNIFICANT CHANGE UP (ref 27.5–35.5)
BACTERIA # UR AUTO: NEGATIVE — SIGNIFICANT CHANGE UP
BILIRUB UR-MCNC: NEGATIVE — SIGNIFICANT CHANGE UP
BUN SERPL-MCNC: 35 MG/DL — HIGH (ref 7–23)
CALCIUM SERPL-MCNC: 8.8 MG/DL — SIGNIFICANT CHANGE UP (ref 8.4–10.5)
CHLORIDE SERPL-SCNC: 104 MMOL/L — SIGNIFICANT CHANGE UP (ref 96–108)
CO2 SERPL-SCNC: 22 MMOL/L — SIGNIFICANT CHANGE UP (ref 22–31)
COLOR SPEC: YELLOW — SIGNIFICANT CHANGE UP
CREAT SERPL-MCNC: 1.4 MG/DL — HIGH (ref 0.5–1.3)
DIFF PNL FLD: ABNORMAL
EPI CELLS # UR: 8 /HPF — HIGH
GLUCOSE BLDC GLUCOMTR-MCNC: 121 MG/DL — HIGH (ref 70–99)
GLUCOSE BLDC GLUCOMTR-MCNC: 139 MG/DL — HIGH (ref 70–99)
GLUCOSE BLDC GLUCOMTR-MCNC: 142 MG/DL — HIGH (ref 70–99)
GLUCOSE BLDC GLUCOMTR-MCNC: 185 MG/DL — HIGH (ref 70–99)
GLUCOSE BLDC GLUCOMTR-MCNC: 70 MG/DL — SIGNIFICANT CHANGE UP (ref 70–99)
GLUCOSE SERPL-MCNC: 141 MG/DL — HIGH (ref 70–99)
GLUCOSE UR QL: NEGATIVE — SIGNIFICANT CHANGE UP
HCT VFR BLD CALC: 33.3 % — LOW (ref 34.5–45)
HGB BLD-MCNC: 10.2 G/DL — LOW (ref 11.5–15.5)
HYALINE CASTS # UR AUTO: 2 /LPF — SIGNIFICANT CHANGE UP (ref 0–2)
INR BLD: 1.41 RATIO — HIGH (ref 0.88–1.16)
KETONES UR-MCNC: NEGATIVE — SIGNIFICANT CHANGE UP
LEUKOCYTE ESTERASE UR-ACNC: ABNORMAL
MAGNESIUM SERPL-MCNC: 2.3 MG/DL — SIGNIFICANT CHANGE UP (ref 1.6–2.6)
MCHC RBC-ENTMCNC: 29.1 PG — SIGNIFICANT CHANGE UP (ref 27–34)
MCHC RBC-ENTMCNC: 30.6 GM/DL — LOW (ref 32–36)
MCV RBC AUTO: 95.1 FL — SIGNIFICANT CHANGE UP (ref 80–100)
NITRITE UR-MCNC: NEGATIVE — SIGNIFICANT CHANGE UP
NRBC # BLD: 0 /100 WBCS — SIGNIFICANT CHANGE UP (ref 0–0)
PH UR: 6 — SIGNIFICANT CHANGE UP (ref 5–8)
PHOSPHATE SERPL-MCNC: 2.4 MG/DL — LOW (ref 2.5–4.5)
PLATELET # BLD AUTO: 193 K/UL — SIGNIFICANT CHANGE UP (ref 150–400)
POTASSIUM SERPL-MCNC: 4.2 MMOL/L — SIGNIFICANT CHANGE UP (ref 3.5–5.3)
POTASSIUM SERPL-SCNC: 4.2 MMOL/L — SIGNIFICANT CHANGE UP (ref 3.5–5.3)
PROT UR-MCNC: ABNORMAL
PROTHROM AB SERPL-ACNC: 16.7 SEC — HIGH (ref 10.6–13.6)
RBC # BLD: 3.5 M/UL — LOW (ref 3.8–5.2)
RBC # FLD: 15.9 % — HIGH (ref 10.3–14.5)
RBC CASTS # UR COMP ASSIST: 4 /HPF — SIGNIFICANT CHANGE UP (ref 0–4)
SODIUM SERPL-SCNC: 138 MMOL/L — SIGNIFICANT CHANGE UP (ref 135–145)
SP GR SPEC: 1.02 — SIGNIFICANT CHANGE UP (ref 1.01–1.02)
T4 FREE SERPL-MCNC: 1.6 NG/DL — SIGNIFICANT CHANGE UP (ref 0.9–1.8)
UROBILINOGEN FLD QL: NEGATIVE — SIGNIFICANT CHANGE UP
WBC # BLD: 16.41 K/UL — HIGH (ref 3.8–10.5)
WBC # FLD AUTO: 16.41 K/UL — HIGH (ref 3.8–10.5)
WBC UR QL: 6 /HPF — HIGH (ref 0–5)

## 2020-11-12 PROCEDURE — 71045 X-RAY EXAM CHEST 1 VIEW: CPT | Mod: 26

## 2020-11-12 RX ORDER — WARFARIN SODIUM 2.5 MG/1
2.5 TABLET ORAL ONCE
Refills: 0 | Status: COMPLETED | OUTPATIENT
Start: 2020-11-12 | End: 2020-11-12

## 2020-11-12 RX ADMIN — Medication 25 MILLIGRAM(S): at 06:12

## 2020-11-12 RX ADMIN — Medication 10 MILLIGRAM(S): at 06:22

## 2020-11-12 RX ADMIN — Medication 25 MILLIGRAM(S): at 22:04

## 2020-11-12 RX ADMIN — INSULIN GLARGINE 40 UNIT(S): 100 INJECTION, SOLUTION SUBCUTANEOUS at 23:13

## 2020-11-12 RX ADMIN — OXYCODONE HYDROCHLORIDE 5 MILLIGRAM(S): 5 TABLET ORAL at 22:05

## 2020-11-12 RX ADMIN — OXYCODONE HYDROCHLORIDE 5 MILLIGRAM(S): 5 TABLET ORAL at 06:42

## 2020-11-12 RX ADMIN — Medication 25 MILLIGRAM(S): at 13:58

## 2020-11-12 RX ADMIN — WARFARIN SODIUM 2.5 MILLIGRAM(S): 2.5 TABLET ORAL at 22:05

## 2020-11-12 RX ADMIN — ATORVASTATIN CALCIUM 40 MILLIGRAM(S): 80 TABLET, FILM COATED ORAL at 22:05

## 2020-11-12 RX ADMIN — MUPIROCIN 1 APPLICATION(S): 20 OINTMENT TOPICAL at 17:15

## 2020-11-12 RX ADMIN — Medication 10 UNIT(S): at 12:51

## 2020-11-12 RX ADMIN — FAMOTIDINE 20 MILLIGRAM(S): 10 INJECTION INTRAVENOUS at 06:13

## 2020-11-12 RX ADMIN — Medication 1: at 17:15

## 2020-11-12 RX ADMIN — Medication 10 UNIT(S): at 07:59

## 2020-11-12 RX ADMIN — ENOXAPARIN SODIUM 40 MILLIGRAM(S): 100 INJECTION SUBCUTANEOUS at 10:10

## 2020-11-12 RX ADMIN — OXYCODONE HYDROCHLORIDE 5 MILLIGRAM(S): 5 TABLET ORAL at 06:13

## 2020-11-12 RX ADMIN — Medication 10 UNIT(S): at 17:15

## 2020-11-12 RX ADMIN — CHLORHEXIDINE GLUCONATE 1 APPLICATION(S): 213 SOLUTION TOPICAL at 10:04

## 2020-11-12 RX ADMIN — Medication 81 MILLIGRAM(S): at 10:10

## 2020-11-12 RX ADMIN — FAMOTIDINE 20 MILLIGRAM(S): 10 INJECTION INTRAVENOUS at 17:15

## 2020-11-12 RX ADMIN — MUPIROCIN 1 APPLICATION(S): 20 OINTMENT TOPICAL at 06:13

## 2020-11-12 NOTE — DIETITIAN INITIAL EVALUATION ADULT. - ETIOLOGY
increased nutrient demands for post-op wound healing and recovery limited prior exposure to Coumadin education

## 2020-11-12 NOTE — PROGRESS NOTE ADULT - ASSESSMENT
80F, Khmer speaking, with a PMH of renal insufficiency, HTN, DVT (on Eliquis 5mg BID, unsure of last dose prior to admission), CHF, DMII, HLD, obesity, R breast cancer s/p mastectomy, colon cancer s/p resection, rectal cancer s/p chemo and radiation, lymphedema of arm, presenting with R hip pain s/p mechanical fall 3 weeks prior to admission. Discussed case with daughter Shauna as well. Pt has been walking since her fall 3 weeks ago with consistent pain. Pt is able to bear weight and walks with assistive devices. Pt presented to ED with shortness of breath as well. Admitted to orthopedic surgery, planned for total hip replacement if medically cleared. Cardiology stated that based on current ACC/AHA guidelines, patient history and physical exam, the patient is considered to have elevated risk given her abn stress test, sob and abn EKG, and recommended that pt needs cath prior to surgery. Cath showed 60% stenosis of pLAD, 90% stenosis of Cx and 90% stenosis of RCA. TTE showed normal LV function. CT surgery was consulted for CABG evaluation w/ Dr. Heath.    11/5: Pt seen and evaluated at bedside - NICO, NAD. Pt is amenable to CT surgery at this time. CT surgery pre-op workup in progress. D/w Dr. Heath - pt scheduled for OR Monday 11/9. Recommend initiating ASA 81 mg QD pre-op and continuation of Statin and beta blocker.  11/7: preop workup in progress; continue asa/ statin/b-blockers, mrsa nares- on bactroban, ck covid in am, OHS 11/9 with Dr. Heath   11/8: VSS, COVID PCR sent, lovenox dc'd. OR Mon 2nd case.   11/9: S/p CABG (LIMA to LAD, SVG to RCA)  11/10: Extubated successfully, transferred to step down. Continue Lovenox, ASA/statin, lopressor. Continue insulin gtt. PT recommending subacute rehab  11/11: Will administer warfarin 5mg. Cr uptrending to 1.47. Will remove both CT today. Will wean off insulin gtt today.  Endo consult.  D/c chest tubes intro chen.  Hold on floor transfer 2/2 insulin infusionpt is afebrile. will ck u/a BB increased to 50,m  11/12 VSS - pt refused blood draw this am - instructed on importance of blood draws explained to pt.  rounds made w/ Dr. Heath   wbc increasing will ck u/a- lopressor increased to 50mg po bid - d/c plan rehab

## 2020-11-12 NOTE — PROGRESS NOTE ADULT - PROBLEM SELECTOR PROBLEM 2
Leukocytosis Mart-1 - Positive Histology Text: MART-1 staining demonstrates areas of higher density and clustering of melanocytes with Pagetoid spread upwards within the epidermis. The surgical margins are positive for tumor cells.

## 2020-11-12 NOTE — PROGRESS NOTE ADULT - SUBJECTIVE AND OBJECTIVE BOX
Chief complaint  Patient is a 80y old  Female who presents with a chief complaint of sob (12 Nov 2020 11:45)   Review of systems  Patient in bed, looks comfortable, no hypoglycemic episodes.    Labs and Fingersticks  CAPILLARY BLOOD GLUCOSE  146 (11 Nov 2020 13:00)      POCT Blood Glucose.: 139 mg/dL (12 Nov 2020 11:44)  POCT Blood Glucose.: 121 mg/dL (12 Nov 2020 07:45)  POCT Blood Glucose.: 109 mg/dL (11 Nov 2020 21:41)  POCT Blood Glucose.: 151 mg/dL (11 Nov 2020 16:48)  POCT Blood Glucose.: 124 mg/dL (11 Nov 2020 15:25)  POCT Blood Glucose.: 126 mg/dL (11 Nov 2020 14:04)  POCT Blood Glucose.: 146 mg/dL (11 Nov 2020 13:05)    Medications  MEDICATIONS  (STANDING):  aspirin enteric coated 81 milliGRAM(s) Oral daily  aspirin Suppository 300 milliGRAM(s) Rectal once  atorvastatin 40 milliGRAM(s) Oral at bedtime  chlorhexidine 2% Cloths 1 Application(s) Topical <User Schedule>  dextrose 40% Gel 15 Gram(s) Oral once  dextrose 40% Gel 15 Gram(s) Oral once  dextrose 5%. 1000 milliLiter(s) (50 mL/Hr) IV Continuous <Continuous>  dextrose 5%. 1000 milliLiter(s) (100 mL/Hr) IV Continuous <Continuous>  dextrose 50% Injectable 25 Gram(s) IV Push once  dextrose 50% Injectable 12.5 Gram(s) IV Push once  dextrose 50% Injectable 25 Gram(s) IV Push once  enoxaparin Injectable 40 milliGRAM(s) SubCutaneous daily  famotidine Injectable 20 milliGRAM(s) IV Push every 12 hours  glucagon  Injectable 1 milliGRAM(s) IntraMuscular once  insulin glargine Injectable (LANTUS) 40 Unit(s) SubCutaneous at bedtime  insulin lispro (ADMELOG) corrective regimen sliding scale   SubCutaneous three times a day before meals  insulin lispro (ADMELOG) corrective regimen sliding scale   SubCutaneous at bedtime  insulin lispro Injectable (ADMELOG) 10 Unit(s) SubCutaneous three times a day before meals  metoprolol tartrate 25 milliGRAM(s) Oral three times a day  mupirocin 2% Ointment 1 Application(s) Both Nostrils two times a day  polyethylene glycol 3350 17 Gram(s) Oral daily  sodium chloride 0.9%. 1000 milliLiter(s) (10 mL/Hr) IV Continuous <Continuous>      Physical Exam  General: Patient comfortable in bed  Vital Signs Last 12 Hrs  T(F): 98.6 (11-12-20 @ 06:00), Max: 98.6 (11-12-20 @ 06:00)  HR: 90 (11-12-20 @ 06:00) (90 - 90)  BP: 125/65 (11-12-20 @ 06:00) (125/65 - 125/65)  BP(mean): --  RR: 20 (11-12-20 @ 06:00) (20 - 20)  SpO2: 93% (11-12-20 @ 06:00) (93% - 93%)   Chief complaint  Patient is a 80y old  Female who presents with a chief complaint of sob (12 Nov 2020 11:45)   Review of systems  Patient in bed, looks comfortable, no hypoglycemic episodes.    Labs and Fingersticks  CAPILLARY BLOOD GLUCOSE  146 (11 Nov 2020 13:00)      POCT Blood Glucose.: 139 mg/dL (12 Nov 2020 11:44)  POCT Blood Glucose.: 121 mg/dL (12 Nov 2020 07:45)  POCT Blood Glucose.: 109 mg/dL (11 Nov 2020 21:41)  POCT Blood Glucose.: 151 mg/dL (11 Nov 2020 16:48)  POCT Blood Glucose.: 124 mg/dL (11 Nov 2020 15:25)  POCT Blood Glucose.: 126 mg/dL (11 Nov 2020 14:04)  POCT Blood Glucose.: 146 mg/dL (11 Nov 2020 13:05)    Medications  MEDICATIONS  (STANDING):  aspirin enteric coated 81 milliGRAM(s) Oral daily  aspirin Suppository 300 milliGRAM(s) Rectal once  atorvastatin 40 milliGRAM(s) Oral at bedtime  chlorhexidine 2% Cloths 1 Application(s) Topical <User Schedule>  dextrose 40% Gel 15 Gram(s) Oral once  dextrose 40% Gel 15 Gram(s) Oral once  dextrose 5%. 1000 milliLiter(s) (50 mL/Hr) IV Continuous <Continuous>  dextrose 5%. 1000 milliLiter(s) (100 mL/Hr) IV Continuous <Continuous>  dextrose 50% Injectable 25 Gram(s) IV Push once  dextrose 50% Injectable 12.5 Gram(s) IV Push once  dextrose 50% Injectable 25 Gram(s) IV Push once  enoxaparin Injectable 40 milliGRAM(s) SubCutaneous daily  famotidine Injectable 20 milliGRAM(s) IV Push every 12 hours  glucagon  Injectable 1 milliGRAM(s) IntraMuscular once  insulin glargine Injectable (LANTUS) 40 Unit(s) SubCutaneous at bedtime  insulin lispro (ADMELOG) corrective regimen sliding scale   SubCutaneous three times a day before meals  insulin lispro (ADMELOG) corrective regimen sliding scale   SubCutaneous at bedtime  insulin lispro Injectable (ADMELOG) 10 Unit(s) SubCutaneous three times a day before meals  metoprolol tartrate 25 milliGRAM(s) Oral three times a day  mupirocin 2% Ointment 1 Application(s) Both Nostrils two times a day  polyethylene glycol 3350 17 Gram(s) Oral daily  sodium chloride 0.9%. 1000 milliLiter(s) (10 mL/Hr) IV Continuous <Continuous>      Physical Exam  General: Patient comfortable in bed  Vital Signs Last 12 Hrs  T(F): 98.6 (11-12-20 @ 06:00), Max: 98.6 (11-12-20 @ 06:00)  HR: 90 (11-12-20 @ 06:00) (90 - 90)  BP: 125/65 (11-12-20 @ 06:00) (125/65 - 125/65)  BP(mean): --  RR: 20 (11-12-20 @ 06:00) (20 - 20)  SpO2: 93% (11-12-20 @ 06:00) (93% - 93%)

## 2020-11-12 NOTE — PROGRESS NOTE ADULT - SUBJECTIVE AND OBJECTIVE BOX
Subjective: Patient seen and examined. No new events except as noted.     SUBJECTIVE/ROS:  feels ok       MEDICATIONS:  MEDICATIONS  (STANDING):  aspirin enteric coated 81 milliGRAM(s) Oral daily  aspirin Suppository 300 milliGRAM(s) Rectal once  atorvastatin 40 milliGRAM(s) Oral at bedtime  chlorhexidine 2% Cloths 1 Application(s) Topical <User Schedule>  dextrose 40% Gel 15 Gram(s) Oral once  dextrose 40% Gel 15 Gram(s) Oral once  dextrose 5%. 1000 milliLiter(s) (50 mL/Hr) IV Continuous <Continuous>  dextrose 5%. 1000 milliLiter(s) (100 mL/Hr) IV Continuous <Continuous>  dextrose 50% Injectable 25 Gram(s) IV Push once  dextrose 50% Injectable 12.5 Gram(s) IV Push once  dextrose 50% Injectable 25 Gram(s) IV Push once  enoxaparin Injectable 40 milliGRAM(s) SubCutaneous daily  famotidine Injectable 20 milliGRAM(s) IV Push every 12 hours  glucagon  Injectable 1 milliGRAM(s) IntraMuscular once  insulin glargine Injectable (LANTUS) 40 Unit(s) SubCutaneous at bedtime  insulin lispro (ADMELOG) corrective regimen sliding scale   SubCutaneous three times a day before meals  insulin lispro (ADMELOG) corrective regimen sliding scale   SubCutaneous at bedtime  insulin lispro Injectable (ADMELOG) 10 Unit(s) SubCutaneous three times a day before meals  metoprolol tartrate 25 milliGRAM(s) Oral three times a day  mupirocin 2% Ointment 1 Application(s) Both Nostrils two times a day  polyethylene glycol 3350 17 Gram(s) Oral daily  sodium chloride 0.9%. 1000 milliLiter(s) (10 mL/Hr) IV Continuous <Continuous>      PHYSICAL EXAM:  T(C): 37 (11-12-20 @ 06:00), Max: 37.3 (11-11-20 @ 15:11)  HR: 90 (11-12-20 @ 06:00) (72 - 90)  BP: 125/65 (11-12-20 @ 06:00) (125/65 - 157/67)  RR: 20 (11-12-20 @ 06:00) (18 - 20)  SpO2: 93% (11-12-20 @ 06:00) (93% - 100%)  Wt(kg): --  I&O's Summary    11 Nov 2020 07:01  -  12 Nov 2020 07:00  --------------------------------------------------------  IN: 899 mL / OUT: 1240 mL / NET: -341 mL            JVP: Normal  Neck: supple  Lung: clear   CV: S1 S2 , Murmur:  Abd: soft  Ext: No edema  neuro: Awake / alert  Psych: flat affect  Skin: normal``    LABS/DATA:    CARDIAC MARKERS:  CARDIAC MARKERS ( 09 Nov 2020 19:58 )  x     / x     / 221 U/L / x     / 30.7 ng/mL                                10.6   27.52 )-----------( 244      ( 11 Nov 2020 06:35 )             34.4     11-11    135  |  102  |  38<H>  ----------------------------<  116<H>  4.4   |  22  |  1.47<H>    Ca    8.7      11 Nov 2020 06:35  Phos  3.6     11-11  Mg     2.5     11-11      proBNP:   Lipid Profile:   HgA1c:   TSH:     TELE:  EKG:

## 2020-11-12 NOTE — PROGRESS NOTE ADULT - ASSESSMENT
Assessment  DMT2: 80y Female with DM T2 with hyperglycemia, A1C 7.5%, was on oral meds at home, postop, transitioned to basal bolus insulin yesterday, blood sugars improved and trending within acceptable range, no hypoglycemic episodes, eating meals, appears comfortable.  CAD: s/p CABG, on medications, stable, monitored.  Hyperthyroidism: Patient has no history thyroid disease, was not on any thyroid supplements, TSH suppressed, FT4 pending.          Dior Monroy MD  Cell: [phone]  Office: [phone]               Assessment  DMT2: 80y Female with DM T2 with hyperglycemia, A1C 7.5%, was on oral meds at home, postop, transitioned to basal bolus insulin yesterday, blood sugars improved and trending within acceptable range,  no hypoglycemic episodes, eating meals, appears comfortable.  CAD: s/p CABG, on medications, stable, monitored.  Hyperthyroidism: Patient has no history thyroid disease, was not on any thyroid supplements, TSH suppressed, FT4 pending.          Dior Monroy MD  Cell: [phone]  Office: [phone]

## 2020-11-12 NOTE — PROGRESS NOTE ADULT - SUBJECTIVE AND OBJECTIVE BOX
NEPHROLOGY-[facility] [phone]        Patient seen and examined in bed.  She was in good spirits         MEDICATIONS  (STANDING):  aspirin enteric coated 81 milliGRAM(s) Oral daily  aspirin Suppository 300 milliGRAM(s) Rectal once  atorvastatin 40 milliGRAM(s) Oral at bedtime  chlorhexidine 2% Cloths 1 Application(s) Topical <User Schedule>  dextrose 40% Gel 15 Gram(s) Oral once  dextrose 40% Gel 15 Gram(s) Oral once  dextrose 5%. 1000 milliLiter(s) (50 mL/Hr) IV Continuous <Continuous>  dextrose 5%. 1000 milliLiter(s) (100 mL/Hr) IV Continuous <Continuous>  dextrose 50% Injectable 25 Gram(s) IV Push once  dextrose 50% Injectable 12.5 Gram(s) IV Push once  dextrose 50% Injectable 25 Gram(s) IV Push once  enoxaparin Injectable 40 milliGRAM(s) SubCutaneous daily  famotidine Injectable 20 milliGRAM(s) IV Push every 12 hours  glucagon  Injectable 1 milliGRAM(s) IntraMuscular once  insulin glargine Injectable (LANTUS) 40 Unit(s) SubCutaneous at bedtime  insulin lispro (ADMELOG) corrective regimen sliding scale   SubCutaneous three times a day before meals  insulin lispro (ADMELOG) corrective regimen sliding scale   SubCutaneous at bedtime  insulin lispro Injectable (ADMELOG) 10 Unit(s) SubCutaneous three times a day before meals  metoprolol tartrate 25 milliGRAM(s) Oral three times a day  mupirocin 2% Ointment 1 Application(s) Both Nostrils two times a day  polyethylene glycol 3350 17 Gram(s) Oral daily  sodium chloride 0.9%. 1000 milliLiter(s) (10 mL/Hr) IV Continuous <Continuous>      VITAL:  T(C): , Max: 37.3 (11-11-20 @ 15:11)  T(F): , Max: 99.1 (11-11-20 @ 15:11)  HR: 90 (11-12-20 @ 06:00)  BP: 125/65 (11-12-20 @ 06:00)  BP(mean): 96 (11-11-20 @ 20:57)  RR: 20 (11-12-20 @ 06:00)  SpO2: 93% (11-12-20 @ 06:00)  Wt(kg): --    I and O's:    11-11 @ 07:01  -  11-12 @ 07:00  --------------------------------------------------------  IN: 899 mL / OUT: 1240 mL / NET: -341 mL    11-12 @ 07:01  -  11-12 @ 11:57  --------------------------------------------------------  IN: 200 mL / OUT: 300 mL / NET: -100 mL          PHYSICAL EXAM:    Constitutional: NAD  Neck:  No JVD  Respiratory: CTAB/L  Cardiovascular: S1 and S2  Gastrointestinal: BS+, soft, NT/ND  Extremities: +  peripheral edema  Neurological: A/O x 3, no focal deficits  Psychiatric: Normal mood, normal affect  : No [place]  Skin: No rashes  Access: Not applicable    LABS:                        10.2   16.41 )-----------( 193      ( 12 Nov 2020 11:42 )             33.3     11-11    135  |  102  |  38<H>  ----------------------------<  116<H>  4.4   |  22  |  1.47<H>    Ca    8.7      11 Nov 2020 06:35  Phos  3.6     11-11  Mg     2.5     11-11            Urine Studies:          RADIOLOGY & ADDITIONAL STUDIES:

## 2020-11-12 NOTE — DIETITIAN INITIAL EVALUATION ADULT. - ORAL INTAKE PTA/DIET HISTORY
Pt is primarily [place] speaking, free  services offered, however pt deferred translation to her daughter at bedside. Per daughter, pt has been eating less about 2-3 weeks PTA 2/2 c/o hip pain and inability to cook her herself. She states she was making pt's meals and bringing in to her during this time.

## 2020-11-12 NOTE — DIETITIAN INITIAL EVALUATION ADULT. - ADD RECOMMEND
Recommend add low sodium to diet order to promote heart health. Continue consistent CHO for glycemic control. Will add no added Sugar Mightyshake 4oz 3x daily vanilla for additional 600 kcal, 22 grams protein. Educated pt and daughter at bedside re: post-op nutrition, adequate intake of protein, Coumadin-dietary interaction, vitamin K rich food sources, vitamin K consistency, and general heart healthy diet recomemndations. Pt and daughter voiced understanding and accepted Vitamin K foods list and Heart Healthy Nutrition Therapy handout.

## 2020-11-12 NOTE — DIETITIAN INITIAL EVALUATION ADULT. - OTHER INFO
Pt reports decreased po intake post-op, eating about half of meals. Is amenable to try no added sugar Mightyshake 4oz. vanilla with each meal to supplement. She denies N/V/diarrhea or constipation, denies chewing/swallowing difficulties. Pt denies taking vitamin/mineral/herbal supplements PTA. Weight per daughter appears to have been stable, she weighs herself daily at home 2/2 hx of heart failure. UBW is around 188 pounds, now 197 pounds, suspect in setting of  +4 R arm R wrist and +1 generalized edema. Reports pt with limited physical activity 2/2 hip pain. Typical meal intake includes toast and egg, often skips lunch, and will have home cooked meal for example lentils for dinner. In terms of DM management reports pt does not often check her FS at home but believes se is compliant with her oral antihyperglycemic medications at home.

## 2020-11-12 NOTE — DIETITIAN INITIAL EVALUATION ADULT. - CHIEF COMPLAINT
The patient is a 80y Female complaining of difficulty breathing, admitted to orthopedic surgery, planned for total hip replacement if medically cleared. Cardiology stated that based on current ACC/AHA guidelines, patient history and physical exam, the patient is considered to have elevated risk given her abn stress test, sob and abn EKG, and recommended that pt needs cath prior to surgery. Cath showed 60% stenosis of pLAD, 90% stenosis of Cx and 90% stenosis of RCA. She is now s/p CAB on 11/19/20. Skin: No pressure injuries noted.

## 2020-11-12 NOTE — PROGRESS NOTE ADULT - ASSESSMENT
80F w/ HTN, DM2, CKD3a, and HFpEF, 11/2/20 a/w right hip fracture s/p mechanical fall    (1)Renal - stage 3b with non-nephrotic range proteinuria; due to DM/HTN. No evidence to date of TAO, s/p CABG POD # 3    (2)Ortho - right hip fracture -        RECOMMEND:  (1)No renal objection to lasix at present (Lasix 40mg po qd)  (2)Dose new meds for GFR 35-45ml/min (present dosing acceptable)  (3)Ambulate at present       Sayed Stacey   [facility]  Office: [phone]

## 2020-11-12 NOTE — PROGRESS NOTE ADULT - PROBLEM SELECTOR PLAN 1
Will continue current insulin regimen for now. Will continue monitoring FS, log, and FU.  Patient with moderate insulin requirement, will likely require insulin teaching. Will continue monitoring FS/insulin requirement and make recommendations for outpatient management prior to DC.  Patient counseled for compliance with consistent low carb diet and exercise as tolerated outpatient.

## 2020-11-13 LAB
ANION GAP SERPL CALC-SCNC: 12 MMOL/L — SIGNIFICANT CHANGE UP (ref 5–17)
APTT BLD: 29.5 SEC — SIGNIFICANT CHANGE UP (ref 27.5–35.5)
BUN SERPL-MCNC: 28 MG/DL — HIGH (ref 7–23)
CALCIUM SERPL-MCNC: 9.1 MG/DL — SIGNIFICANT CHANGE UP (ref 8.4–10.5)
CHLORIDE SERPL-SCNC: 102 MMOL/L — SIGNIFICANT CHANGE UP (ref 96–108)
CO2 SERPL-SCNC: 23 MMOL/L — SIGNIFICANT CHANGE UP (ref 22–31)
CREAT SERPL-MCNC: 1.45 MG/DL — HIGH (ref 0.5–1.3)
GLUCOSE BLDC GLUCOMTR-MCNC: 120 MG/DL — HIGH (ref 70–99)
GLUCOSE BLDC GLUCOMTR-MCNC: 177 MG/DL — HIGH (ref 70–99)
GLUCOSE BLDC GLUCOMTR-MCNC: 182 MG/DL — HIGH (ref 70–99)
GLUCOSE BLDC GLUCOMTR-MCNC: 90 MG/DL — SIGNIFICANT CHANGE UP (ref 70–99)
GLUCOSE SERPL-MCNC: 207 MG/DL — HIGH (ref 70–99)
HCT VFR BLD CALC: 33.8 % — LOW (ref 34.5–45)
HGB BLD-MCNC: 10.5 G/DL — LOW (ref 11.5–15.5)
INR BLD: 1.62 RATIO — HIGH (ref 0.88–1.16)
MCHC RBC-ENTMCNC: 29.9 PG — SIGNIFICANT CHANGE UP (ref 27–34)
MCHC RBC-ENTMCNC: 31.1 GM/DL — LOW (ref 32–36)
MCV RBC AUTO: 96.3 FL — SIGNIFICANT CHANGE UP (ref 80–100)
NRBC # BLD: 0 /100 WBCS — SIGNIFICANT CHANGE UP (ref 0–0)
PLATELET # BLD AUTO: 214 K/UL — SIGNIFICANT CHANGE UP (ref 150–400)
POTASSIUM SERPL-MCNC: 4.8 MMOL/L — SIGNIFICANT CHANGE UP (ref 3.5–5.3)
POTASSIUM SERPL-SCNC: 4.8 MMOL/L — SIGNIFICANT CHANGE UP (ref 3.5–5.3)
PROTHROM AB SERPL-ACNC: 19 SEC — HIGH (ref 10.6–13.6)
RBC # BLD: 3.51 M/UL — LOW (ref 3.8–5.2)
RBC # FLD: 15.9 % — HIGH (ref 10.3–14.5)
SODIUM SERPL-SCNC: 137 MMOL/L — SIGNIFICANT CHANGE UP (ref 135–145)
WBC # BLD: 13.95 K/UL — HIGH (ref 3.8–10.5)
WBC # FLD AUTO: 13.95 K/UL — HIGH (ref 3.8–10.5)

## 2020-11-13 RX ORDER — WARFARIN SODIUM 2.5 MG/1
5 TABLET ORAL ONCE
Refills: 0 | Status: COMPLETED | OUTPATIENT
Start: 2020-11-13 | End: 2020-11-13

## 2020-11-13 RX ORDER — INSULIN GLARGINE 100 [IU]/ML
42 INJECTION, SOLUTION SUBCUTANEOUS AT BEDTIME
Refills: 0 | Status: DISCONTINUED | OUTPATIENT
Start: 2020-11-13 | End: 2020-11-13

## 2020-11-13 RX ORDER — INSULIN GLARGINE 100 [IU]/ML
34 INJECTION, SOLUTION SUBCUTANEOUS AT BEDTIME
Refills: 0 | Status: DISCONTINUED | OUTPATIENT
Start: 2020-11-13 | End: 2020-11-15

## 2020-11-13 RX ADMIN — Medication 10 UNIT(S): at 17:33

## 2020-11-13 RX ADMIN — Medication 25 MILLIGRAM(S): at 14:02

## 2020-11-13 RX ADMIN — MUPIROCIN 1 APPLICATION(S): 20 OINTMENT TOPICAL at 17:32

## 2020-11-13 RX ADMIN — Medication 25 MILLIGRAM(S): at 21:06

## 2020-11-13 RX ADMIN — ENOXAPARIN SODIUM 40 MILLIGRAM(S): 100 INJECTION SUBCUTANEOUS at 14:02

## 2020-11-13 RX ADMIN — ATORVASTATIN CALCIUM 40 MILLIGRAM(S): 80 TABLET, FILM COATED ORAL at 21:06

## 2020-11-13 RX ADMIN — MUPIROCIN 1 APPLICATION(S): 20 OINTMENT TOPICAL at 06:05

## 2020-11-13 RX ADMIN — Medication 25 MILLIGRAM(S): at 06:05

## 2020-11-13 RX ADMIN — FAMOTIDINE 20 MILLIGRAM(S): 10 INJECTION INTRAVENOUS at 17:35

## 2020-11-13 RX ADMIN — Medication 10 UNIT(S): at 12:44

## 2020-11-13 RX ADMIN — WARFARIN SODIUM 5 MILLIGRAM(S): 2.5 TABLET ORAL at 21:07

## 2020-11-13 RX ADMIN — FAMOTIDINE 20 MILLIGRAM(S): 10 INJECTION INTRAVENOUS at 06:05

## 2020-11-13 RX ADMIN — INSULIN GLARGINE 42 UNIT(S): 100 INJECTION, SOLUTION SUBCUTANEOUS at 22:24

## 2020-11-13 RX ADMIN — Medication 10 UNIT(S): at 08:14

## 2020-11-13 RX ADMIN — Medication 81 MILLIGRAM(S): at 14:02

## 2020-11-13 RX ADMIN — Medication 1: at 08:15

## 2020-11-13 RX ADMIN — CHLORHEXIDINE GLUCONATE 1 APPLICATION(S): 213 SOLUTION TOPICAL at 12:00

## 2020-11-13 RX ADMIN — Medication 1: at 17:33

## 2020-11-13 NOTE — PROGRESS NOTE ADULT - ASSESSMENT
CAD  s/p bypass  stable   asa   statin   fu with CTS     HTN  stable    CKD  monitor lytes   stable crt

## 2020-11-13 NOTE — PROGRESS NOTE ADULT - SUBJECTIVE AND OBJECTIVE BOX
Subjective: Patient seen and examined. No new events except as noted.     SUBJECTIVE/ROS:  no new events       MEDICATIONS:  MEDICATIONS  (STANDING):  aspirin enteric coated 81 milliGRAM(s) Oral daily  aspirin Suppository 300 milliGRAM(s) Rectal once  atorvastatin 40 milliGRAM(s) Oral at bedtime  chlorhexidine 2% Cloths 1 Application(s) Topical <User Schedule>  dextrose 40% Gel 15 Gram(s) Oral once  dextrose 40% Gel 15 Gram(s) Oral once  dextrose 5%. 1000 milliLiter(s) (50 mL/Hr) IV Continuous <Continuous>  dextrose 5%. 1000 milliLiter(s) (100 mL/Hr) IV Continuous <Continuous>  dextrose 50% Injectable 25 Gram(s) IV Push once  dextrose 50% Injectable 12.5 Gram(s) IV Push once  dextrose 50% Injectable 25 Gram(s) IV Push once  enoxaparin Injectable 40 milliGRAM(s) SubCutaneous daily  famotidine Injectable 20 milliGRAM(s) IV Push every 12 hours  glucagon  Injectable 1 milliGRAM(s) IntraMuscular once  insulin glargine Injectable (LANTUS) 40 Unit(s) SubCutaneous at bedtime  insulin lispro (ADMELOG) corrective regimen sliding scale   SubCutaneous three times a day before meals  insulin lispro (ADMELOG) corrective regimen sliding scale   SubCutaneous at bedtime  insulin lispro Injectable (ADMELOG) 10 Unit(s) SubCutaneous three times a day before meals  metoprolol tartrate 25 milliGRAM(s) Oral three times a day  mupirocin 2% Ointment 1 Application(s) Both Nostrils two times a day  polyethylene glycol 3350 17 Gram(s) Oral daily  sodium chloride 0.9%. 1000 milliLiter(s) (10 mL/Hr) IV Continuous <Continuous>      PHYSICAL EXAM:  T(C): 36.8 (11-13-20 @ 05:15), Max: 36.8 (11-13-20 @ 05:15)  HR: 80 (11-13-20 @ 05:15) (80 - 89)  BP: 132/61 (11-13-20 @ 05:15) (120/79 - 146/84)  RR: 18 (11-13-20 @ 05:15) (18 - 18)  SpO2: 95% (11-13-20 @ 05:15) (93% - 98%)  Wt(kg): --  I&O's Summary    12 Nov 2020 07:01  -  13 Nov 2020 07:00  --------------------------------------------------------  IN: 560 mL / OUT: 1350 mL / NET: -790 mL            JVP: Normal  Neck: supple  Lung: clear   CV: S1 S2 , Murmur:  Abd: soft  Ext: No edema  neuro: Awake / alert  Psych: flat affect  Skin: normal``    LABS/DATA:    CARDIAC MARKERS:                                10.2   16.41 )-----------( 193      ( 12 Nov 2020 11:42 )             33.3     11-12    138  |  104  |  35<H>  ----------------------------<  141<H>  4.2   |  22  |  1.40<H>    Ca    8.8      12 Nov 2020 11:42  Phos  2.4     11-12  Mg     2.3     11-12      proBNP:   Lipid Profile:   HgA1c:   TSH:     TELE:  EKG:

## 2020-11-13 NOTE — PROGRESS NOTE ADULT - ASSESSMENT
80F w/ HTN, DM2, CKD3a, and HFpEF, 11/2/20 a/w right hip fracture s/p mechanical fall    (1)Renal - stage 3b with non-nephrotic range proteinuria; due to DM/HTN. No evidence to date of TAO, s/p CABG POD # 4  Hypophosphatemia     (2)Ortho - right hip fracture -        RECOMMEND:  (1)No renal objection to lasix at present (Lasix 40mg po qd) but maintaining negative balance on her own   Encourage diary for the low phos.  If goes down in am then Neutraphos PO  (2)Dose new meds for GFR 35-45ml/min (present dosing acceptable)  (3)Ambulate at present       [facility]  Office: [phone]

## 2020-11-13 NOTE — PROGRESS NOTE ADULT - SUBJECTIVE AND OBJECTIVE BOX
VITAL SIGNS    Telemetry:   78-90  Vital Signs Last 24 Hrs  T(C): 36.8 (20 @ 05:15), Max: 36.8 (20 @ 05:15)  T(F): 98.2 (20 @ 05:15), Max: 98.2 (20 @ 05:15)  HR: 80 (20 @ 05:15) (80 - 89)  BP: 132/61 (20 @ 05:15) (120/79 - 146/84)  RR: 18 (20 @ 05:15) (18 - 18)  SpO2: 95% (20 @ 05:15) (93% - 98%)             @ 07:01  -   @ 07:00  --------------------------------------------------------  IN: 560 mL / OUT: 1350 mL / NET: -790 mL     @ 07:01  -   @ 11:33  --------------------------------------------------------  IN: 240 mL / OUT: 300 mL / NET: -60 mL       Daily     Daily Weight in k.6 (2020 08:05)  Admit Wt: Drug Dosing Weight  Height (cm): 152.4 (2020 14:38)  Weight (kg): 89.4 (2020 14:38)  BMI (kg/m2): 38.5 (2020 14:38)  BSA (m2): 1.86 (2020 14:38)      CAPILLARY BLOOD GLUCOSE      POCT Blood Glucose.: 177 mg/dL (2020 07:36)  POCT Blood Glucose.: 142 mg/dL (2020 22:59)  POCT Blood Glucose.: 70 mg/dL (2020 21:55)  POCT Blood Glucose.: 185 mg/dL (2020 16:57)  POCT Blood Glucose.: 139 mg/dL (2020 11:44)          MEDICATIONS  acetaminophen   Tablet .. 650 milliGRAM(s) Oral every 6 hours PRN  aspirin enteric coated 81 milliGRAM(s) Oral daily  aspirin Suppository 300 milliGRAM(s) Rectal once  atorvastatin 40 milliGRAM(s) Oral at bedtime  chlorhexidine 2% Cloths 1 Application(s) Topical <User Schedule>  dextrose 40% Gel 15 Gram(s) Oral once  dextrose 40% Gel 15 Gram(s) Oral once  dextrose 5%. 1000 milliLiter(s) IV Continuous <Continuous>  dextrose 5%. 1000 milliLiter(s) IV Continuous <Continuous>  dextrose 50% Injectable 25 Gram(s) IV Push once  dextrose 50% Injectable 12.5 Gram(s) IV Push once  dextrose 50% Injectable 25 Gram(s) IV Push once  enoxaparin Injectable 40 milliGRAM(s) SubCutaneous daily  famotidine Injectable 20 milliGRAM(s) IV Push every 12 hours  glucagon  Injectable 1 milliGRAM(s) IntraMuscular once  insulin glargine Injectable (LANTUS) 42 Unit(s) SubCutaneous at bedtime  insulin lispro (ADMELOG) corrective regimen sliding scale   SubCutaneous three times a day before meals  insulin lispro (ADMELOG) corrective regimen sliding scale   SubCutaneous at bedtime  insulin lispro Injectable (ADMELOG) 10 Unit(s) SubCutaneous three times a day before meals  metoprolol tartrate 25 milliGRAM(s) Oral three times a day  mupirocin 2% Ointment 1 Application(s) Both Nostrils two times a day  oxyCODONE    IR 5 milliGRAM(s) Oral every 6 hours PRN  polyethylene glycol 3350 17 Gram(s) Oral daily  sodium chloride 0.9%. 1000 milliLiter(s) IV Continuous <Continuous>      >>> <<<  PHYSICAL EXAM  Subjective: NAD OOB to chair  Neurology: alert and oriented x 3, nonfocal, no gross deficits  CV : s1s2  Sternal Wound :  CDI , Stable  Lungs: CTA b/l  Abdomen: soft, NT,ND, ( +)BM  :  voiding  Extremities: + 2 edema RLE. trace edema LLE      LABS  11-13    137  |  102  |  28<H>  ----------------------------<  207<H>  4.8   |  23  |  1.45<H>    Ca    9.1      2020 09:44  Phos  2.4     11-12  Mg     2.3     11-12                                   10.5   13.95 )-----------( 214      ( 2020 09:44 )             33.8          PT/INR - ( 2020 09:44 )   PT: 19.0 sec;   INR: 1.62 ratio         PTT - ( 2020 09:44 )  PTT:29.5 sec       PAST MEDICAL & SURGICAL HISTORY:  Renal insufficiency    DVT of leg (deep venous thrombosis)  right calf DVT  2019 pt on Eliquis    CHF (congestive heart failure)    Type II diabetes mellitus    Obese    Rectal cancer  s/p chemotherapy and  radiation 12 weeks 2015 -3/2015    Hyperlipidemia    Lymphedema of arm  right arm s/p mastectomy    HTN (hypertension)    Breast CA, right   s/p mastectomy ,IV Chemotherapy    S/P TKR (total knee replacement), right  2017    S/P colon resection      S/P ileostomy  low anterior resection-8/10/15, reversal of ileostomy 2016    History of appendectomy      S/P mastectomy, right

## 2020-11-13 NOTE — PROGRESS NOTE ADULT - PROBLEM SELECTOR PLAN 1
Will increase Lantus to 42u at bedtime.  Will continue Admelog 10u before each meal as well as Admelog correction scale coverage. Will continue monitoring FS and FU.  Patient with moderate insulin requirement, will likely require insulin teaching. Will continue monitoring FS/insulin requirement and make recommendations for outpatient management prior to DC.  Patient counseled for compliance with consistent low carb diet and exercise as tolerated outpatient.

## 2020-11-13 NOTE — PROGRESS NOTE ADULT - SUBJECTIVE AND OBJECTIVE BOX
Chief complaint  Patient is a 80y old  Female who presents with a chief complaint of CAD (13 Nov 2020 11:33)   Review of systems  Patient awake in chair, looks comfortable, no hypoglycemic episodes.    Labs and Fingersticks  CAPILLARY BLOOD GLUCOSE      POCT Blood Glucose.: 120 mg/dL (13 Nov 2020 11:33)  POCT Blood Glucose.: 177 mg/dL (13 Nov 2020 07:36)  POCT Blood Glucose.: 142 mg/dL (12 Nov 2020 22:59)  POCT Blood Glucose.: 70 mg/dL (12 Nov 2020 21:55)  POCT Blood Glucose.: 185 mg/dL (12 Nov 2020 16:57)      Anion Gap, Serum: 12 (11-13 @ 09:44)  Anion Gap, Serum: 12 (11-12 @ 11:42)      Calcium, Total Serum: 9.1 (11-13 @ 09:44)  Calcium, Total Serum: 8.8 (11-12 @ 11:42)          11-13    137  |  102  |  28<H>  ----------------------------<  207<H>  4.8   |  23  |  1.45<H>    Ca    9.1      13 Nov 2020 09:44  Phos  2.4     11-12  Mg     2.3     11-12                          10.5   13.95 )-----------( 214      ( 13 Nov 2020 09:44 )             33.8     Medications  MEDICATIONS  (STANDING):  aspirin enteric coated 81 milliGRAM(s) Oral daily  aspirin Suppository 300 milliGRAM(s) Rectal once  atorvastatin 40 milliGRAM(s) Oral at bedtime  chlorhexidine 2% Cloths 1 Application(s) Topical <User Schedule>  dextrose 40% Gel 15 Gram(s) Oral once  dextrose 40% Gel 15 Gram(s) Oral once  dextrose 5%. 1000 milliLiter(s) (50 mL/Hr) IV Continuous <Continuous>  dextrose 5%. 1000 milliLiter(s) (100 mL/Hr) IV Continuous <Continuous>  dextrose 50% Injectable 25 Gram(s) IV Push once  dextrose 50% Injectable 12.5 Gram(s) IV Push once  dextrose 50% Injectable 25 Gram(s) IV Push once  enoxaparin Injectable 40 milliGRAM(s) SubCutaneous daily  famotidine Injectable 20 milliGRAM(s) IV Push every 12 hours  glucagon  Injectable 1 milliGRAM(s) IntraMuscular once  insulin glargine Injectable (LANTUS) 42 Unit(s) SubCutaneous at bedtime  insulin lispro (ADMELOG) corrective regimen sliding scale   SubCutaneous three times a day before meals  insulin lispro (ADMELOG) corrective regimen sliding scale   SubCutaneous at bedtime  insulin lispro Injectable (ADMELOG) 10 Unit(s) SubCutaneous three times a day before meals  metoprolol tartrate 25 milliGRAM(s) Oral three times a day  mupirocin 2% Ointment 1 Application(s) Both Nostrils two times a day  polyethylene glycol 3350 17 Gram(s) Oral daily  sodium chloride 0.9%. 1000 milliLiter(s) (10 mL/Hr) IV Continuous <Continuous>  warfarin 5 milliGRAM(s) Oral once      Physical Exam  General: Patient comfortable in bed  Vital Signs Last 12 Hrs  T(F): 98.2 (11-13-20 @ 05:15), Max: 98.2 (11-13-20 @ 05:15)  HR: 80 (11-13-20 @ 05:15) (80 - 80)  BP: 132/61 (11-13-20 @ 05:15) (132/61 - 132/61)  BP(mean): --  RR: 18 (11-13-20 @ 05:15) (18 - 18)  SpO2: 95% (11-13-20 @ 05:15) (95% - 95%)  Neck: No palpable thyroid nodules.  CVS: S1S2, No murmurs  Respiratory: No wheezing, no crepitations  GI: Abdomen soft, bowel sounds positive  Musculoskeletal:  edema lower extremities.   Skin: No skin rashes, no ecchymosis    Diagnostics    Free Thyroxine, Serum: AM Sched. Collection: 12-Nov-2020 06:00 (11-11 @ 12:29)           Chief complaint  Patient is a 80y old  Female who presents with a chief complaint of CAD (13 Nov 2020 11:33)   Review of systems  Patient awake in chair, looks comfortable, no hypoglycemic episodes.    Labs and Fingersticks  CAPILLARY BLOOD GLUCOSE      POCT Blood Glucose.: 120 mg/dL (13 Nov 2020 11:33)  POCT Blood Glucose.: 177 mg/dL (13 Nov 2020 07:36)  POCT Blood Glucose.: 142 mg/dL (12 Nov 2020 22:59)  POCT Blood Glucose.: 70 mg/dL (12 Nov 2020 21:55)  POCT Blood Glucose.: 185 mg/dL (12 Nov 2020 16:57)      Anion Gap, Serum: 12 (11-13 @ 09:44)  Anion Gap, Serum: 12 (11-12 @ 11:42)      Calcium, Total Serum: 9.1 (11-13 @ 09:44)  Calcium, Total Serum: 8.8 (11-12 @ 11:42)          11-13    137  |  102  |  28<H>  ----------------------------<  207<H>  4.8   |  23  |  1.45<H>    Ca    9.1      13 Nov 2020 09:44  Phos  2.4     11-12  Mg     2.3     11-12                          10.5   13.95 )-----------( 214      ( 13 Nov 2020 09:44 )             33.8     Medications  MEDICATIONS  (STANDING):  aspirin enteric coated 81 milliGRAM(s) Oral daily  aspirin Suppository 300 milliGRAM(s) Rectal once  atorvastatin 40 milliGRAM(s) Oral at bedtime  chlorhexidine 2% Cloths 1 Application(s) Topical <User Schedule>  dextrose 40% Gel 15 Gram(s) Oral once  dextrose 40% Gel 15 Gram(s) Oral once  dextrose 5%. 1000 milliLiter(s) (50 mL/Hr) IV Continuous <Continuous>  dextrose 5%. 1000 milliLiter(s) (100 mL/Hr) IV Continuous <Continuous>  dextrose 50% Injectable 25 Gram(s) IV Push once  dextrose 50% Injectable 12.5 Gram(s) IV Push once  dextrose 50% Injectable 25 Gram(s) IV Push once  enoxaparin Injectable 40 milliGRAM(s) SubCutaneous daily  famotidine Injectable 20 milliGRAM(s) IV Push every 12 hours  glucagon  Injectable 1 milliGRAM(s) IntraMuscular once  insulin glargine Injectable (LANTUS) 42 Unit(s) SubCutaneous at bedtime  insulin lispro (ADMELOG) corrective regimen sliding scale   SubCutaneous three times a day before meals  insulin lispro (ADMELOG) corrective regimen sliding scale   SubCutaneous at bedtime  insulin lispro Injectable (ADMELOG) 10 Unit(s) SubCutaneous three times a day before meals  metoprolol tartrate 25 milliGRAM(s) Oral three times a day  mupirocin 2% Ointment 1 Application(s) Both Nostrils two times a day  polyethylene glycol 3350 17 Gram(s) Oral daily  sodium chloride 0.9%. 1000 milliLiter(s) (10 mL/Hr) IV Continuous <Continuous>  warfarin 5 milliGRAM(s) Oral once      Physical Exam  General: Patient comfortable in bed  Vital Signs Last 12 Hrs  T(F): 98.2 (11-13-20 @ 05:15), Max: 98.2 (11-13-20 @ 05:15)  HR: 80 (11-13-20 @ 05:15) (80 - 80)  BP: 132/61 (11-13-20 @ 05:15) (132/61 - 132/61)  BP(mean): --  RR: 18 (11-13-20 @ 05:15) (18 - 18)  SpO2: 95% (11-13-20 @ 05:15) (95% - 95%)  Neck: No palpable thyroid nodules.  CVS: S1S2, No murmurs  Respiratory: No wheezing, no crepitations  GI: Abdomen soft, bowel sounds positive  Musculoskeletal:  edema lower extremities.   Skin: No skin rashes, no ecchymosis    Diagnostics    Free Thyroxine, Serum: AM Sched. Collection: 12-Nov-2020 06:00 (11-11 @ 12:29)

## 2020-11-13 NOTE — PROGRESS NOTE ADULT - ASSESSMENT
Assessment  DMT2: 80y Female with DM T2 with hyperglycemia, A1C 7.5%, was on oral meds at home, postop on basal bolus insulin yesterday, blood sugars running high and not at target this AM, FS now improving, no hypoglycemic episodes, eating meals, appears comfortable.  CAD: s/p CABG, on medications, stable, monitored.  Hyperthyroidism: Patient has no history thyroid disease, was not on any thyroid supplements, subclinical, asymptomatic.      Dior Monroy MD  Cell: [phone]  Office: [phone]         Assessment  DMT2: 80y Female with DM T2 with hyperglycemia, A1C 7.5%, was on oral meds at home,  postop on basal bolus insulin yesterday, blood sugars running high and not at target this AM, FS now improving, no hypoglycemic episodes, eating meals, appears comfortable.  CAD: s/p CABG, on medications, stable, monitored.  Hyperthyroidism: Patient has no history thyroid disease, was not on any thyroid supplements, subclinical, asymptomatic.      Dior Monroy MD  Cell: [phone]  Office: [phone]

## 2020-11-13 NOTE — PROGRESS NOTE ADULT - SUBJECTIVE AND OBJECTIVE BOX
[facility] [phone]        Patient seen and examined in the chair.  She was in good spirits         MEDICATIONS  (STANDING):  aspirin enteric coated 81 milliGRAM(s) Oral daily  aspirin Suppository 300 milliGRAM(s) Rectal once  atorvastatin 40 milliGRAM(s) Oral at bedtime  chlorhexidine 2% Cloths 1 Application(s) Topical <User Schedule>  dextrose 40% Gel 15 Gram(s) Oral once  dextrose 40% Gel 15 Gram(s) Oral once  dextrose 5%. 1000 milliLiter(s) (50 mL/Hr) IV Continuous <Continuous>  dextrose 5%. 1000 milliLiter(s) (100 mL/Hr) IV Continuous <Continuous>  dextrose 50% Injectable 25 Gram(s) IV Push once  dextrose 50% Injectable 12.5 Gram(s) IV Push once  dextrose 50% Injectable 25 Gram(s) IV Push once  enoxaparin Injectable 40 milliGRAM(s) SubCutaneous daily  famotidine Injectable 20 milliGRAM(s) IV Push every 12 hours  glucagon  Injectable 1 milliGRAM(s) IntraMuscular once  insulin glargine Injectable (LANTUS) 42 Unit(s) SubCutaneous at bedtime  insulin lispro (ADMELOG) corrective regimen sliding scale   SubCutaneous three times a day before meals  insulin lispro (ADMELOG) corrective regimen sliding scale   SubCutaneous at bedtime  insulin lispro Injectable (ADMELOG) 10 Unit(s) SubCutaneous three times a day before meals  metoprolol tartrate 25 milliGRAM(s) Oral three times a day  mupirocin 2% Ointment 1 Application(s) Both Nostrils two times a day  polyethylene glycol 3350 17 Gram(s) Oral daily  sodium chloride 0.9%. 1000 milliLiter(s) (10 mL/Hr) IV Continuous <Continuous>      VITAL:  T(C): , Max: 36.8 (20 @ 05:15)  T(F): , Max: 98.2 (20 @ 05:15)  HR: 80 (20 @ 05:15)  BP: 132/61 (20 @ 05:15)  BP(mean): --  RR: 18 (20 @ 05:15)  SpO2: 95% (20 @ 05:15)  Wt(kg): --    I and O's:     @ 07: @ 07:00  --------------------------------------------------------  IN: 560 mL / OUT: 1350 mL / NET: -790 mL     @ 07: @ 11:26  --------------------------------------------------------  IN: 240 mL / OUT: 300 mL / NET: -60 mL          PHYSICAL EXAM:    Constitutional: NAD  Neck:  No JVD  Respiratory: CTAB/L  Cardiovascular: S1 and S2  Gastrointestinal: BS+, soft, NT/ND  Extremities: +  peripheral edema  Neurological: A/O x 3, no focal deficits  Psychiatric: Normal mood, normal affect  : No [place]  Skin: No rashes  Access: Not applicable    LABS:                        10.5   13.95 )-----------( 214      ( 2020 09:44 )             33.8     11-    137  |  102  |  28<H>  ----------------------------<  207<H>  4.8   |  23  |  1.45<H>    Ca    9.1      2020 09:44  Phos  2.4     11-12  Mg     2.3     -12            Urine Studies:  Urinalysis Basic - ( 2020 11:25 )    Color: Yellow / Appearance: Slightly Turbid / S.023 / pH: x  Gluc: x / Ketone: Negative  / Bili: Negative / Urobili: Negative   Blood: x / Protein: 30 mg/dL / Nitrite: Negative   Leuk Esterase: Small / RBC: 4 /hpf / WBC 6 /HPF   Sq Epi: x / Non Sq Epi: 8 /hpf / Bacteria: Negative            RADIOLOGY & ADDITIONAL STUDIES:

## 2020-11-13 NOTE — PROGRESS NOTE ADULT - ASSESSMENT
80F, [place] speaking, with a PMH of renal insufficiency, HTN, DVT (on Eliquis 5mg BID, unsure of last dose prior to admission), CHF, DMII, HLD, obesity, R breast cancer s/p mastectomy, colon cancer s/p resection, rectal cancer s/p chemo and radiation, lymphedema of arm, presenting with R hip pain s/p mechanical fall 3 weeks prior to admission. Discussed case with daughter Shauna as well. Pt has been walking since her fall 3 weeks ago with consistent pain. Pt is able to bear weight and walks with assistive devices. Pt presented to ED with shortness of breath as well. Admitted to orthopedic surgery, planned for total hip replacement if medically cleared. Cardiology stated that based on current ACC/AHA guidelines, patient history and physical exam, the patient is considered to have elevated risk given her abn stress test, sob and abn EKG, and recommended that pt needs cath prior to surgery. Cath showed 60% stenosis of pLAD, 90% stenosis of Cx and 90% stenosis of RCA. TTE showed normal LV function. CT surgery was consulted for CABG evaluation w/ Dr. Heath.    11/5: Pt seen and evaluated at bedside - NICO, NAD. Pt is amenable to CT surgery at this time. CT surgery pre-op workup in progress. D/w Dr. Heath - pt scheduled for OR Monday 11/9. Recommend initiating ASA 81 mg QD pre-op and continuation of Statin and beta blocker.  11/7: preop workup in progress; continue asa/ statin/b-blockers, mrsa nares- on bactroban, ck covid in am, OHS 11/9 with Dr. Heath   11/8: VSS, COVID PCR sent, lovenox dc'd. OR Mon 2nd case.   11/9: S/p CABG (LIMA to LAD, SVG to RCA)  11/10: Extubated successfully, transferred to step down. Continue Lovenox, ASA/statin, lopressor. Continue insulin gtt. PT recommending subacute rehab  11/11: Will administer warfarin 5mg. Cr uptrending to 1.47. Will remove both CT today. Will wean off insulin gtt today.  Endo consult.  D/c chest tubes intro chen.  Hold on floor transfer 2/2 insulin infusionpt is afebrile. will ck u/a BB increased to 50,m  11/12 VSS - pt refused blood draw this am - instructed on importance of blood draws explained to pt.  rounds made w/ Dr. Heath   wbc increasing will ck u/a- lopressor increased to 50mg po bid - d/c plan rehab  11/13 PW removed. INR 1.62 Coumadin 5mg tonight Pending insurance authorization for rehab.  Creat 1.45

## 2020-11-14 LAB
ANION GAP SERPL CALC-SCNC: 9 MMOL/L — SIGNIFICANT CHANGE UP (ref 5–17)
APTT BLD: 31.6 SEC — SIGNIFICANT CHANGE UP (ref 27.5–35.5)
BUN SERPL-MCNC: 26 MG/DL — HIGH (ref 7–23)
CALCIUM SERPL-MCNC: 8.6 MG/DL — SIGNIFICANT CHANGE UP (ref 8.4–10.5)
CHLORIDE SERPL-SCNC: 103 MMOL/L — SIGNIFICANT CHANGE UP (ref 96–108)
CO2 SERPL-SCNC: 24 MMOL/L — SIGNIFICANT CHANGE UP (ref 22–31)
CREAT SERPL-MCNC: 1.38 MG/DL — HIGH (ref 0.5–1.3)
GLUCOSE BLDC GLUCOMTR-MCNC: 110 MG/DL — HIGH (ref 70–99)
GLUCOSE BLDC GLUCOMTR-MCNC: 115 MG/DL — HIGH (ref 70–99)
GLUCOSE BLDC GLUCOMTR-MCNC: 124 MG/DL — HIGH (ref 70–99)
GLUCOSE BLDC GLUCOMTR-MCNC: 141 MG/DL — HIGH (ref 70–99)
GLUCOSE BLDC GLUCOMTR-MCNC: 147 MG/DL — HIGH (ref 70–99)
GLUCOSE BLDC GLUCOMTR-MCNC: 80 MG/DL — SIGNIFICANT CHANGE UP (ref 70–99)
GLUCOSE SERPL-MCNC: 196 MG/DL — HIGH (ref 70–99)
HCT VFR BLD CALC: 31.8 % — LOW (ref 34.5–45)
HGB BLD-MCNC: 9.8 G/DL — LOW (ref 11.5–15.5)
INR BLD: 1.84 RATIO — HIGH (ref 0.88–1.16)
MCHC RBC-ENTMCNC: 29.4 PG — SIGNIFICANT CHANGE UP (ref 27–34)
MCHC RBC-ENTMCNC: 30.8 GM/DL — LOW (ref 32–36)
MCV RBC AUTO: 95.5 FL — SIGNIFICANT CHANGE UP (ref 80–100)
NRBC # BLD: 0 /100 WBCS — SIGNIFICANT CHANGE UP (ref 0–0)
PLATELET # BLD AUTO: 225 K/UL — SIGNIFICANT CHANGE UP (ref 150–400)
POTASSIUM SERPL-MCNC: 4.2 MMOL/L — SIGNIFICANT CHANGE UP (ref 3.5–5.3)
POTASSIUM SERPL-SCNC: 4.2 MMOL/L — SIGNIFICANT CHANGE UP (ref 3.5–5.3)
PROTHROM AB SERPL-ACNC: 21.4 SEC — HIGH (ref 10.6–13.6)
RBC # BLD: 3.33 M/UL — LOW (ref 3.8–5.2)
RBC # FLD: 15.8 % — HIGH (ref 10.3–14.5)
SODIUM SERPL-SCNC: 136 MMOL/L — SIGNIFICANT CHANGE UP (ref 135–145)
WBC # BLD: 11.25 K/UL — HIGH (ref 3.8–10.5)
WBC # FLD AUTO: 11.25 K/UL — HIGH (ref 3.8–10.5)

## 2020-11-14 RX ORDER — WARFARIN SODIUM 2.5 MG/1
5 TABLET ORAL ONCE
Refills: 0 | Status: COMPLETED | OUTPATIENT
Start: 2020-11-14 | End: 2020-11-14

## 2020-11-14 RX ORDER — METOPROLOL TARTRATE 50 MG
25 TABLET ORAL ONCE
Refills: 0 | Status: COMPLETED | OUTPATIENT
Start: 2020-11-14 | End: 2020-11-14

## 2020-11-14 RX ORDER — METOPROLOL TARTRATE 50 MG
50 TABLET ORAL
Refills: 0 | Status: DISCONTINUED | OUTPATIENT
Start: 2020-11-14 | End: 2020-11-15

## 2020-11-14 RX ADMIN — MUPIROCIN 1 APPLICATION(S): 20 OINTMENT TOPICAL at 17:02

## 2020-11-14 RX ADMIN — Medication 25 MILLIGRAM(S): at 09:36

## 2020-11-14 RX ADMIN — OXYCODONE HYDROCHLORIDE 5 MILLIGRAM(S): 5 TABLET ORAL at 07:48

## 2020-11-14 RX ADMIN — ATORVASTATIN CALCIUM 40 MILLIGRAM(S): 80 TABLET, FILM COATED ORAL at 21:15

## 2020-11-14 RX ADMIN — Medication 10 UNIT(S): at 07:48

## 2020-11-14 RX ADMIN — Medication 650 MILLIGRAM(S): at 04:05

## 2020-11-14 RX ADMIN — INSULIN GLARGINE 34 UNIT(S): 100 INJECTION, SOLUTION SUBCUTANEOUS at 22:54

## 2020-11-14 RX ADMIN — FAMOTIDINE 20 MILLIGRAM(S): 10 INJECTION INTRAVENOUS at 17:01

## 2020-11-14 RX ADMIN — Medication 81 MILLIGRAM(S): at 09:36

## 2020-11-14 RX ADMIN — Medication 50 MILLIGRAM(S): at 17:02

## 2020-11-14 RX ADMIN — MUPIROCIN 1 APPLICATION(S): 20 OINTMENT TOPICAL at 05:10

## 2020-11-14 RX ADMIN — Medication 10 UNIT(S): at 16:57

## 2020-11-14 RX ADMIN — ENOXAPARIN SODIUM 40 MILLIGRAM(S): 100 INJECTION SUBCUTANEOUS at 09:36

## 2020-11-14 RX ADMIN — Medication 10 UNIT(S): at 12:37

## 2020-11-14 RX ADMIN — Medication 25 MILLIGRAM(S): at 05:10

## 2020-11-14 RX ADMIN — Medication 650 MILLIGRAM(S): at 03:42

## 2020-11-14 RX ADMIN — OXYCODONE HYDROCHLORIDE 5 MILLIGRAM(S): 5 TABLET ORAL at 17:26

## 2020-11-14 RX ADMIN — OXYCODONE HYDROCHLORIDE 5 MILLIGRAM(S): 5 TABLET ORAL at 16:56

## 2020-11-14 RX ADMIN — OXYCODONE HYDROCHLORIDE 5 MILLIGRAM(S): 5 TABLET ORAL at 08:18

## 2020-11-14 RX ADMIN — CHLORHEXIDINE GLUCONATE 1 APPLICATION(S): 213 SOLUTION TOPICAL at 05:10

## 2020-11-14 RX ADMIN — FAMOTIDINE 20 MILLIGRAM(S): 10 INJECTION INTRAVENOUS at 05:10

## 2020-11-14 RX ADMIN — WARFARIN SODIUM 5 MILLIGRAM(S): 2.5 TABLET ORAL at 21:15

## 2020-11-14 NOTE — PROGRESS NOTE ADULT - SUBJECTIVE AND OBJECTIVE BOX
Chief complaint  Patient is a 80y old  Female who presents with a chief complaint of CAD (14 Nov 2020 12:48)   Review of systems  Patient in bed, appears comfortable.    Labs and Fingersticks  CAPILLARY BLOOD GLUCOSE      POCT Blood Glucose.: 141 mg/dL (14 Nov 2020 12:36)  POCT Blood Glucose.: 115 mg/dL (14 Nov 2020 11:30)  POCT Blood Glucose.: 124 mg/dL (14 Nov 2020 07:30)  POCT Blood Glucose.: 90 mg/dL (13 Nov 2020 21:25)  POCT Blood Glucose.: 182 mg/dL (13 Nov 2020 16:41)      Anion Gap, Serum: 9 (11-14 @ 09:42)  Anion Gap, Serum: 12 (11-13 @ 09:44)      Calcium, Total Serum: 8.6 (11-14 @ 09:42)  Calcium, Total Serum: 9.1 (11-13 @ 09:44)          11-14    136  |  103  |  26<H>  ----------------------------<  196<H>  4.2   |  24  |  1.38<H>    Ca    8.6      14 Nov 2020 09:42                          9.8    11.25 )-----------( 225      ( 14 Nov 2020 09:42 )             31.8     Medications  MEDICATIONS  (STANDING):  aspirin enteric coated 81 milliGRAM(s) Oral daily  aspirin Suppository 300 milliGRAM(s) Rectal once  atorvastatin 40 milliGRAM(s) Oral at bedtime  chlorhexidine 2% Cloths 1 Application(s) Topical <User Schedule>  dextrose 40% Gel 15 Gram(s) Oral once  dextrose 40% Gel 15 Gram(s) Oral once  dextrose 5%. 1000 milliLiter(s) (50 mL/Hr) IV Continuous <Continuous>  dextrose 5%. 1000 milliLiter(s) (100 mL/Hr) IV Continuous <Continuous>  dextrose 50% Injectable 25 Gram(s) IV Push once  dextrose 50% Injectable 12.5 Gram(s) IV Push once  dextrose 50% Injectable 25 Gram(s) IV Push once  enoxaparin Injectable 40 milliGRAM(s) SubCutaneous daily  famotidine Injectable 20 milliGRAM(s) IV Push every 12 hours  glucagon  Injectable 1 milliGRAM(s) IntraMuscular once  insulin glargine Injectable (LANTUS) 34 Unit(s) SubCutaneous at bedtime  insulin lispro (ADMELOG) corrective regimen sliding scale   SubCutaneous three times a day before meals  insulin lispro (ADMELOG) corrective regimen sliding scale   SubCutaneous at bedtime  insulin lispro Injectable (ADMELOG) 10 Unit(s) SubCutaneous three times a day before meals  metoprolol tartrate 50 milliGRAM(s) Oral two times a day  mupirocin 2% Ointment 1 Application(s) Both Nostrils two times a day  polyethylene glycol 3350 17 Gram(s) Oral daily  sodium chloride 0.9%. 1000 milliLiter(s) (10 mL/Hr) IV Continuous <Continuous>  warfarin 5 milliGRAM(s) Oral once      Physical Exam  General: Patient comfortable in bed  Vital Signs Last 12 Hrs  T(F): 98.1 (11-14-20 @ 14:34), Max: 98.1 (11-14-20 @ 14:34)  HR: 81 (11-14-20 @ 14:34) (79 - 81)  BP: 150/67 (11-14-20 @ 14:34) (150/67 - 172/84)  BP(mean): 113 (11-14-20 @ 04:30) (113 - 113)  RR: 18 (11-14-20 @ 14:34) (18 - 18)  SpO2: 99% (11-14-20 @ 14:34) (99% - 99%)  Neck: No palpable thyroid nodules.  CVS: S1S2, No murmurs  Respiratory: No wheezing, no crepitations  GI: Abdomen soft, bowel sounds positive  Musculoskeletal:  edema lower extremities.     Diagnostics

## 2020-11-14 NOTE — PROGRESS NOTE ADULT - ASSESSMENT
80F, Serbian speaking, with a PMH of renal insufficiency, HTN, DVT (on Eliquis 5mg BID, unsure of last dose prior to admission), CHF, DMII, HLD, obesity, R breast cancer s/p mastectomy, colon cancer s/p resection, rectal cancer s/p chemo and radiation, lymphedema of arm, presenting with R hip pain s/p mechanical fall 3 weeks prior to admission. Discussed case with daughter Shauna as well. Pt has been walking since her fall 3 weeks ago with consistent pain. Pt is able to bear weight and walks with assistive devices. Pt presented to ED with shortness of breath as well. Admitted to orthopedic surgery, planned for total hip replacement if medically cleared. Cardiology stated that based on current ACC/AHA guidelines, patient history and physical exam, the patient is considered to have elevated risk given her abn stress test, sob and abn EKG, and recommended that pt needs cath prior to surgery. Cath showed 60% stenosis of pLAD, 90% stenosis of Cx and 90% stenosis of RCA. TTE showed normal LV function. CT surgery was consulted for CABG evaluation w/ Dr. Heath.    11/5: Pt seen and evaluated at bedside - NICO, NAD. Pt is amenable to CT surgery at this time. CT surgery pre-op workup in progress. D/w Dr. Heath - pt scheduled for OR Monday 11/9. Recommend initiating ASA 81 mg QD pre-op and continuation of Statin and beta blocker.  11/7: preop workup in progress; continue asa/ statin/b-blockers, mrsa nares- on bactroban, ck covid in am, OHS 11/9 with Dr. Heath   11/8: VSS, COVID PCR sent, lovenox dc'd. OR Mon 2nd case.   11/9: S/p CABG (LIMA to LAD, SVG to RCA)  11/10: Extubated successfully, transferred to step down. Continue Lovenox, ASA/statin, lopressor. Continue insulin gtt. PT recommending subacute rehab  11/11: Will administer warfarin 5mg. Cr uptrending to 1.47. Will remove both CT today. Will wean off insulin gtt today.  Endo consult.  D/c chest tubes intro chen.  Hold on floor transfer 2/2 insulin infusionpt is afebrile. will ck u/a BB increased to 50,m  11/12 VSS - pt refused blood draw this am - instructed on importance of blood draws explained to pt.  rounds made w/ Dr. Heath   wbc increasing will ck u/a- lopressor increased to 50mg po bid - d/c plan rehab  11/13 PW removed. INR 1.62 Coumadin 5mg tonight Pending insurance authorization for rehab.  Creat 1.45  11/14 Creatinine improved,  Coumadin 5 mg for INR 1.84 Lopressor increased 50mg bid.

## 2020-11-14 NOTE — PROGRESS NOTE ADULT - ASSESSMENT
CAD  s/p bypass  stable   asa   statin     HTN  stable    CKD  monitor lytes   stable crt     fu labs

## 2020-11-14 NOTE — PROGRESS NOTE ADULT - SUBJECTIVE AND OBJECTIVE BOX
VITAL SIGNS    Telemetry:  SR 70's  Vital Signs Last 24 Hrs  T(C): 36.6 (20 @ 04:30), Max: 37.2 (20 @ 19:58)  T(F): 97.9 (20 @ 04:30), Max: 99 (20 @ 19:58)  HR: 79 (20 @ 04:30) (71 - 82)  BP: 154/81 (20 @ 06:49) (132/76 - 172/84)  RR: 18 (20 @ 04:30) (18 - 18)  SpO2: 99% (20 @ 04:30) (95% - 99%)             @ 07:01  -   @ 07:00  --------------------------------------------------------  IN: 940 mL / OUT: 1700 mL / NET: -760 mL     @ 07:01  -   @ 12:48  --------------------------------------------------------  IN: 240 mL / OUT: 0 mL / NET: 240 mL       Daily     Daily Weight in k (2020 08:19)  Admit Wt: Drug Dosing Weight  Height (cm): 152.4 (2020 14:38)  Weight (kg): 89.4 (2020 14:38)  BMI (kg/m2): 38.5 (2020 14:38)  BSA (m2): 1.86 (2020 14:38)      CAPILLARY BLOOD GLUCOSE      POCT Blood Glucose.: 141 mg/dL (2020 12:36)  POCT Blood Glucose.: 115 mg/dL (2020 11:30)  POCT Blood Glucose.: 124 mg/dL (2020 07:30)  POCT Blood Glucose.: 90 mg/dL (2020 21:25)  POCT Blood Glucose.: 182 mg/dL (2020 16:41)          MEDICATIONS  acetaminophen   Tablet .. 650 milliGRAM(s) Oral every 6 hours PRN  aspirin enteric coated 81 milliGRAM(s) Oral daily  aspirin Suppository 300 milliGRAM(s) Rectal once  atorvastatin 40 milliGRAM(s) Oral at bedtime  chlorhexidine 2% Cloths 1 Application(s) Topical <User Schedule>  enoxaparin Injectable 40 milliGRAM(s) SubCutaneous daily  famotidine Injectable 20 milliGRAM(s) IV Push every 12 hours  glucagon  Injectable 1 milliGRAM(s) IntraMuscular once  insulin glargine Injectable (LANTUS) 34 Unit(s) SubCutaneous at bedtime  insulin lispro (ADMELOG) corrective regimen sliding scale   SubCutaneous three times a day before meals  insulin lispro (ADMELOG) corrective regimen sliding scale   SubCutaneous at bedtime  insulin lispro Injectable (ADMELOG) 10 Unit(s) SubCutaneous three times a day before meals  metoprolol tartrate 50 milliGRAM(s) Oral two times a day  mupirocin 2% Ointment 1 Application(s) Both Nostrils two times a day  oxyCODONE    IR 5 milliGRAM(s) Oral every 6 hours PRN  polyethylene glycol 3350 17 Gram(s) Oral daily  sodium chloride 0.9%. 1000 milliLiter(s) IV Continuous <Continuous>  warfarin 5 milliGRAM(s) Oral once      >>> <<<  PHYSICAL EXAM  Subjective: NAD  Neurology: alert and oriented x 3, nonfocal, no gross deficits  CV : s1s2  Sternal Wound :  CDI , Stable  Lungs: CTA b/l  Abdomen: soft, NT,ND, (+ )BM  :  voiding  Extremities:   +2 edema rle, no calf tenderness    LABS      136  |  103  |  26<H>  ----------------------------<  196<H>  4.2   |  24  |  1.38<H>    Ca    8.6      2020 09:42                                   9.8    11.25 )-----------( 225      ( 2020 09:42 )             31.8          PT/INR - ( 2020 09:42 )   PT: 21.4 sec;   INR: 1.84 ratio         PTT - ( 2020 09:42 )  PTT:31.6 sec       PAST MEDICAL & SURGICAL HISTORY:  Renal insufficiency    DVT of leg (deep venous thrombosis)  right calf DVT  2019 pt on Eliquis    CHF (congestive heart failure)    Type II diabetes mellitus    Obese    Rectal cancer  s/p chemotherapy and  radiation 12 weeks 2015 -3/2015    Hyperlipidemia    Lymphedema of arm  right arm s/p mastectomy    HTN (hypertension)    Breast CA, right   s/p mastectomy ,IV Chemotherapy    S/P TKR (total knee replacement), right      S/P colon resection      S/P ileostomy  low anterior resection-8/10/15, reversal of ileostomy     History of appendectomy      S/P mastectomy, right

## 2020-11-14 NOTE — PROVIDER CONTACT NOTE (OTHER) - BACKGROUND
80y old female. PMH breast cancer, R arm lymphedema, DVT, CHF, CAD, HTN, HLD, DM. S/P fall 3 weeks ago with R hip pain. Current admission for SOB. Dx HF.

## 2020-11-14 NOTE — PROGRESS NOTE ADULT - SUBJECTIVE AND OBJECTIVE BOX
Subjective: Patient seen and examined. No new events except as noted.     SUBJECTIVE/ROS:  feels ok       MEDICATIONS:  MEDICATIONS  (STANDING):  aspirin enteric coated 81 milliGRAM(s) Oral daily  aspirin Suppository 300 milliGRAM(s) Rectal once  atorvastatin 40 milliGRAM(s) Oral at bedtime  chlorhexidine 2% Cloths 1 Application(s) Topical <User Schedule>  dextrose 40% Gel 15 Gram(s) Oral once  dextrose 40% Gel 15 Gram(s) Oral once  dextrose 5%. 1000 milliLiter(s) (50 mL/Hr) IV Continuous <Continuous>  dextrose 5%. 1000 milliLiter(s) (100 mL/Hr) IV Continuous <Continuous>  dextrose 50% Injectable 25 Gram(s) IV Push once  dextrose 50% Injectable 12.5 Gram(s) IV Push once  dextrose 50% Injectable 25 Gram(s) IV Push once  enoxaparin Injectable 40 milliGRAM(s) SubCutaneous daily  famotidine Injectable 20 milliGRAM(s) IV Push every 12 hours  glucagon  Injectable 1 milliGRAM(s) IntraMuscular once  insulin glargine Injectable (LANTUS) 34 Unit(s) SubCutaneous at bedtime  insulin lispro (ADMELOG) corrective regimen sliding scale   SubCutaneous three times a day before meals  insulin lispro (ADMELOG) corrective regimen sliding scale   SubCutaneous at bedtime  insulin lispro Injectable (ADMELOG) 10 Unit(s) SubCutaneous three times a day before meals  metoprolol tartrate 25 milliGRAM(s) Oral three times a day  mupirocin 2% Ointment 1 Application(s) Both Nostrils two times a day  polyethylene glycol 3350 17 Gram(s) Oral daily  sodium chloride 0.9%. 1000 milliLiter(s) (10 mL/Hr) IV Continuous <Continuous>      PHYSICAL EXAM:  T(C): 36.6 (11-14-20 @ 04:30), Max: 37.2 (11-13-20 @ 19:58)  HR: 79 (11-14-20 @ 04:30) (71 - 82)  BP: 172/84 (11-14-20 @ 04:30) (132/76 - 172/84)  RR: 18 (11-14-20 @ 04:30) (18 - 18)  SpO2: 99% (11-14-20 @ 04:30) (95% - 99%)  Wt(kg): --  I&O's Summary    12 Nov 2020 07:01  -  13 Nov 2020 07:00  --------------------------------------------------------  IN: 560 mL / OUT: 1350 mL / NET: -790 mL    13 Nov 2020 07:01  -  14 Nov 2020 05:56  --------------------------------------------------------  IN: 890 mL / OUT: 850 mL / NET: 40 mL            JVP: Normal  Neck: supple  Lung: clear   CV: S1 S2 , Murmur:  Abd: soft  Ext: No edema  neuro: Awake / alert  Psych: flat affect  Skin: normal``    LABS/DATA:    CARDIAC MARKERS:                                10.5   13.95 )-----------( 214      ( 13 Nov 2020 09:44 )             33.8     11-13    137  |  102  |  28<H>  ----------------------------<  207<H>  4.8   |  23  |  1.45<H>    Ca    9.1      13 Nov 2020 09:44  Phos  2.4     11-12  Mg     2.3     11-12      proBNP:   Lipid Profile:   HgA1c:   TSH:     TELE:  EKG:

## 2020-11-14 NOTE — PROGRESS NOTE ADULT - ASSESSMENT
Seen and examined at bedside sitting in chair. No new events noted      acetaminophen   Tablet .. 650 milliGRAM(s) Oral every 6 hours PRN  aspirin enteric coated 81 milliGRAM(s) Oral daily  aspirin Suppository 300 milliGRAM(s) Rectal once  atorvastatin 40 milliGRAM(s) Oral at bedtime  chlorhexidine 2% Cloths 1 Application(s) Topical <User Schedule>  dextrose 40% Gel 15 Gram(s) Oral once  dextrose 40% Gel 15 Gram(s) Oral once  dextrose 5%. 1000 milliLiter(s) IV Continuous <Continuous>  dextrose 5%. 1000 milliLiter(s) IV Continuous <Continuous>  dextrose 50% Injectable 25 Gram(s) IV Push once  dextrose 50% Injectable 12.5 Gram(s) IV Push once  dextrose 50% Injectable 25 Gram(s) IV Push once  enoxaparin Injectable 40 milliGRAM(s) SubCutaneous daily  famotidine Injectable 20 milliGRAM(s) IV Push every 12 hours  glucagon  Injectable 1 milliGRAM(s) IntraMuscular once  insulin glargine Injectable (LANTUS) 34 Unit(s) SubCutaneous at bedtime  insulin lispro (ADMELOG) corrective regimen sliding scale   SubCutaneous three times a day before meals  insulin lispro (ADMELOG) corrective regimen sliding scale   SubCutaneous at bedtime  insulin lispro Injectable (ADMELOG) 10 Unit(s) SubCutaneous three times a day before meals  metoprolol tartrate 50 milliGRAM(s) Oral two times a day  mupirocin 2% Ointment 1 Application(s) Both Nostrils two times a day  oxyCODONE    IR 5 milliGRAM(s) Oral every 6 hours PRN  polyethylene glycol 3350 17 Gram(s) Oral daily  sodium chloride 0.9%. 1000 milliLiter(s) IV Continuous <Continuous>  warfarin 5 milliGRAM(s) Oral once      VITAL:  T(C): , Max: 37.2 (11-13-20 @ 19:58)  T(F): , Max: 99 (11-13-20 @ 19:58)  HR: 79 (11-14-20 @ 04:30)  BP: 154/81 (11-14-20 @ 06:49)  BP(mean): 113 (11-14-20 @ 04:30)  RR: 18 (11-14-20 @ 04:30)  SpO2: 99% (11-14-20 @ 04:30)  Wt(kg): --    11-13-20 @ 07:01  -  11-14-20 @ 07:00  --------------------------------------------------------  IN: 940 mL / OUT: 1700 mL / NET: -760 mL    11-14-20 @ 07:01  -  11-14-20 @ 13:56  --------------------------------------------------------  IN: 240 mL / OUT: 0 mL / NET: 240 mL        PHYSICAL EXAM:  Constitutional: NAD  Neck:  No JVD  Respiratory: CTAB/L  Cardiovascular: S1 and S2  Gastrointestinal: BS+, soft, NT/ND  Extremities: +  peripheral edema  Neurological: A/O x 3, no focal deficits  Psychiatric: Normal mood, normal affect  : No Dowling  Skin: No rashes  Access: Not applicable    LABS:                          9.8    11.25 )-----------( 225      ( 14 Nov 2020 09:42 )             31.8     Na(136)/K(4.2)/Cl(103)/HCO3(24)/BUN(26)/Cr(1.38)Glu(196)/Ca(8.6)/Mg(--)/PO4(--)    11-14 @ 09:42  Na(137)/K(4.8)/Cl(102)/HCO3(23)/BUN(28)/Cr(1.45)Glu(207)/Ca(9.1)/Mg(--)/PO4(--)    11-13 @ 09:44  Na(138)/K(4.2)/Cl(104)/HCO3(22)/BUN(35)/Cr(1.40)Glu(141)/Ca(8.8)/Mg(2.3)/PO4(2.4)    11-12 @ 11:42            ASSESSMENT/PLAN  ASSESSMENT:  80F w/ HTN, DM2, CKD3a, and HFpEF, 11/2/20 a/w right hip fracture s/p mechanical fall    (1)Renal - stage 3b with non-nephrotic range proteinuria; due to DM/HTN. No evidence to date of TAO, s/p CABG POD # 5     Azotemia improving    (2)Hypophosphatemia     (3)Lytes- controlled    (4)Anemia-hgb slightly trending down      (5)Ortho - right hip fracture -        RECOMMEND:  (1) Phos with AM labs please  (1)No renal objection to lasix at present (Lasix 40mg po qd) but maintaining negative balance on her own   Encourage diary for the low phos.  If goes down in am then Neutraphos PO  (2)Dose new meds for GFR 35-45ml/min (present dosing acceptable)  (3)Ambulate at present     Yevgeniy Castelan NP-BC  [employer]  [phone]   Seen and examined at bedside sitting in chair. No new events noted. On nasal cannula  2L/min      acetaminophen   Tablet .. 650 milliGRAM(s) Oral every 6 hours PRN  aspirin enteric coated 81 milliGRAM(s) Oral daily  aspirin Suppository 300 milliGRAM(s) Rectal once  atorvastatin 40 milliGRAM(s) Oral at bedtime  chlorhexidine 2% Cloths 1 Application(s) Topical <User Schedule>  dextrose 40% Gel 15 Gram(s) Oral once  dextrose 40% Gel 15 Gram(s) Oral once  dextrose 5%. 1000 milliLiter(s) IV Continuous <Continuous>  dextrose 5%. 1000 milliLiter(s) IV Continuous <Continuous>  dextrose 50% Injectable 25 Gram(s) IV Push once  dextrose 50% Injectable 12.5 Gram(s) IV Push once  dextrose 50% Injectable 25 Gram(s) IV Push once  enoxaparin Injectable 40 milliGRAM(s) SubCutaneous daily  famotidine Injectable 20 milliGRAM(s) IV Push every 12 hours  glucagon  Injectable 1 milliGRAM(s) IntraMuscular once  insulin glargine Injectable (LANTUS) 34 Unit(s) SubCutaneous at bedtime  insulin lispro (ADMELOG) corrective regimen sliding scale   SubCutaneous three times a day before meals  insulin lispro (ADMELOG) corrective regimen sliding scale   SubCutaneous at bedtime  insulin lispro Injectable (ADMELOG) 10 Unit(s) SubCutaneous three times a day before meals  metoprolol tartrate 50 milliGRAM(s) Oral two times a day  mupirocin 2% Ointment 1 Application(s) Both Nostrils two times a day  oxyCODONE    IR 5 milliGRAM(s) Oral every 6 hours PRN  polyethylene glycol 3350 17 Gram(s) Oral daily  sodium chloride 0.9%. 1000 milliLiter(s) IV Continuous <Continuous>  warfarin 5 milliGRAM(s) Oral once      VITAL:  T(C): , Max: 37.2 (11-13-20 @ 19:58)  T(F): , Max: 99 (11-13-20 @ 19:58)  HR: 79 (11-14-20 @ 04:30)  BP: 154/81 (11-14-20 @ 06:49)  BP(mean): 113 (11-14-20 @ 04:30)  RR: 18 (11-14-20 @ 04:30)  SpO2: 99% (11-14-20 @ 04:30)  Wt(kg): --    11-13-20 @ 07:01  -  11-14-20 @ 07:00  --------------------------------------------------------  IN: 940 mL / OUT: 1700 mL / NET: -760 mL    11-14-20 @ 07:01  -  11-14-20 @ 13:56  --------------------------------------------------------  IN: 240 mL / OUT: 0 mL / NET: 240 mL        PHYSICAL EXAM:  Constitutional: NAD  Neck:  No JVD  Respiratory: CTAB/L  Cardiovascular: S1 and S2  Gastrointestinal: BS+, soft, NT/ND  Extremities: +  peripheral edema  Neurological: A/O x 3, no focal deficits  Psychiatric: Normal mood, normal affect  : No Dowling  Skin: No rashes  Access: Not applicable    LABS:                          9.8    11.25 )-----------( 225      ( 14 Nov 2020 09:42 )             31.8     Na(136)/K(4.2)/Cl(103)/HCO3(24)/BUN(26)/Cr(1.38)Glu(196)/Ca(8.6)/Mg(--)/PO4(--)    11-14 @ 09:42  Na(137)/K(4.8)/Cl(102)/HCO3(23)/BUN(28)/Cr(1.45)Glu(207)/Ca(9.1)/Mg(--)/PO4(--)    11-13 @ 09:44  Na(138)/K(4.2)/Cl(104)/HCO3(22)/BUN(35)/Cr(1.40)Glu(141)/Ca(8.8)/Mg(2.3)/PO4(2.4)    11-12 @ 11:42            ASSESSMENT/PLAN  ASSESSMENT:  80F w/ HTN, DM2, CKD3a, and HFpEF, 11/2/20 a/w right hip fracture s/p mechanical fall    (1)Renal - stage 3b with non-nephrotic range proteinuria; due to DM/HTN. No evidence to date of TAO, s/p CABG POD # 5     Azotemia improving    (2)Hypophosphatemia     (3)Lytes- controlled    (4)Anemia-hgb slightly trending down      (5)Ortho - right hip fracture -        RECOMMEND:  (1) Phos with AM labs please  (1)No renal objection to lasix at present (Lasix 40mg po qd) but maintaining negative balance on her own   Encourage diary for the low phos.  If goes down in am then Neutraphos PO  (2)Dose new meds for GFR 35-45ml/min (present dosing acceptable)  (3)Ambulate at present     Yevgeniy Castelan NP-BC  [employer]  [phone]

## 2020-11-14 NOTE — PROGRESS NOTE ADULT - ASSESSMENT
Assessment  DMT2: 80y Female with DM T2 with hyperglycemia, A1C 7.5%, was on oral meds at home, on basal bolus insulin, blood sugars running trending down. no hypoglycemic episodes, eating meals, appears comfortable.  CAD: s/p CABG, on medications, stable, monitored.  Hyperthyroidism: Patient has no history thyroid disease, was not on any thyroid supplements, subclinical, asymptomatic.      Dior Monroy MD  Cell: [phone]  Office: [phone]

## 2020-11-15 LAB
ANION GAP SERPL CALC-SCNC: 12 MMOL/L — SIGNIFICANT CHANGE UP (ref 5–17)
APPEARANCE UR: CLEAR — SIGNIFICANT CHANGE UP
APTT BLD: 35.2 SEC — SIGNIFICANT CHANGE UP (ref 27.5–35.5)
BACTERIA # UR AUTO: NEGATIVE — SIGNIFICANT CHANGE UP
BILIRUB UR-MCNC: NEGATIVE — SIGNIFICANT CHANGE UP
BUN SERPL-MCNC: 22 MG/DL — SIGNIFICANT CHANGE UP (ref 7–23)
CALCIUM SERPL-MCNC: 8.9 MG/DL — SIGNIFICANT CHANGE UP (ref 8.4–10.5)
CHLORIDE SERPL-SCNC: 102 MMOL/L — SIGNIFICANT CHANGE UP (ref 96–108)
CO2 SERPL-SCNC: 25 MMOL/L — SIGNIFICANT CHANGE UP (ref 22–31)
COLOR SPEC: SIGNIFICANT CHANGE UP
CREAT SERPL-MCNC: 1.28 MG/DL — SIGNIFICANT CHANGE UP (ref 0.5–1.3)
DIFF PNL FLD: NEGATIVE — SIGNIFICANT CHANGE UP
EPI CELLS # UR: 2 /HPF — SIGNIFICANT CHANGE UP (ref 0–5)
GLUCOSE BLDC GLUCOMTR-MCNC: 102 MG/DL — HIGH (ref 70–99)
GLUCOSE BLDC GLUCOMTR-MCNC: 111 MG/DL — HIGH (ref 70–99)
GLUCOSE BLDC GLUCOMTR-MCNC: 140 MG/DL — HIGH (ref 70–99)
GLUCOSE BLDC GLUCOMTR-MCNC: 58 MG/DL — LOW (ref 70–99)
GLUCOSE BLDC GLUCOMTR-MCNC: 63 MG/DL — LOW (ref 70–99)
GLUCOSE BLDC GLUCOMTR-MCNC: 63 MG/DL — LOW (ref 70–99)
GLUCOSE BLDC GLUCOMTR-MCNC: 98 MG/DL — SIGNIFICANT CHANGE UP (ref 70–99)
GLUCOSE SERPL-MCNC: 167 MG/DL — HIGH (ref 70–99)
GLUCOSE UR QL: NEGATIVE — SIGNIFICANT CHANGE UP
HCT VFR BLD CALC: 33.8 % — LOW (ref 34.5–45)
HGB BLD-MCNC: 10.4 G/DL — LOW (ref 11.5–15.5)
HYALINE CASTS # UR AUTO: 1 /LPF — SIGNIFICANT CHANGE UP (ref 0–7)
INR BLD: 2.67 RATIO — HIGH (ref 0.88–1.16)
KETONES UR-MCNC: NEGATIVE — SIGNIFICANT CHANGE UP
LEUKOCYTE ESTERASE UR-ACNC: NEGATIVE — SIGNIFICANT CHANGE UP
MAGNESIUM SERPL-MCNC: 2 MG/DL — SIGNIFICANT CHANGE UP (ref 1.6–2.6)
MCHC RBC-ENTMCNC: 29.4 PG — SIGNIFICANT CHANGE UP (ref 27–34)
MCHC RBC-ENTMCNC: 30.8 GM/DL — LOW (ref 32–36)
MCV RBC AUTO: 95.5 FL — SIGNIFICANT CHANGE UP (ref 80–100)
NITRITE UR-MCNC: NEGATIVE — SIGNIFICANT CHANGE UP
NRBC # BLD: 0 /100 WBCS — SIGNIFICANT CHANGE UP (ref 0–0)
PH UR: 8 — SIGNIFICANT CHANGE UP (ref 5–8)
PHOSPHATE SERPL-MCNC: 2.6 MG/DL — SIGNIFICANT CHANGE UP (ref 2.5–4.5)
PLATELET # BLD AUTO: 287 K/UL — SIGNIFICANT CHANGE UP (ref 150–400)
POTASSIUM SERPL-MCNC: 4.3 MMOL/L — SIGNIFICANT CHANGE UP (ref 3.5–5.3)
POTASSIUM SERPL-SCNC: 4.3 MMOL/L — SIGNIFICANT CHANGE UP (ref 3.5–5.3)
PROT UR-MCNC: SIGNIFICANT CHANGE UP
PROTHROM AB SERPL-ACNC: 30.6 SEC — HIGH (ref 10.6–13.6)
RBC # BLD: 3.54 M/UL — LOW (ref 3.8–5.2)
RBC # FLD: 15.6 % — HIGH (ref 10.3–14.5)
RBC CASTS # UR COMP ASSIST: 1 /HPF — SIGNIFICANT CHANGE UP (ref 0–4)
SARS-COV-2 RNA SPEC QL NAA+PROBE: SIGNIFICANT CHANGE UP
SODIUM SERPL-SCNC: 139 MMOL/L — SIGNIFICANT CHANGE UP (ref 135–145)
SP GR SPEC: 1.01 — SIGNIFICANT CHANGE UP (ref 1.01–1.02)
UROBILINOGEN FLD QL: SIGNIFICANT CHANGE UP
WBC # BLD: 14.91 K/UL — HIGH (ref 3.8–10.5)
WBC # FLD AUTO: 14.91 K/UL — HIGH (ref 3.8–10.5)
WBC UR QL: 20 /HPF — HIGH (ref 0–5)

## 2020-11-15 PROCEDURE — 71045 X-RAY EXAM CHEST 1 VIEW: CPT | Mod: 26

## 2020-11-15 RX ORDER — INSULIN LISPRO 100/ML
9 VIAL (ML) SUBCUTANEOUS
Refills: 0 | Status: DISCONTINUED | OUTPATIENT
Start: 2020-11-15 | End: 2020-11-16

## 2020-11-15 RX ORDER — METOPROLOL TARTRATE 50 MG
75 TABLET ORAL
Refills: 0 | Status: DISCONTINUED | OUTPATIENT
Start: 2020-11-15 | End: 2020-11-17

## 2020-11-15 RX ORDER — INSULIN GLARGINE 100 [IU]/ML
40 INJECTION, SOLUTION SUBCUTANEOUS AT BEDTIME
Refills: 0 | Status: DISCONTINUED | OUTPATIENT
Start: 2020-11-15 | End: 2020-11-16

## 2020-11-15 RX ORDER — DEXTROSE 50 % IN WATER 50 %
12.5 SYRINGE (ML) INTRAVENOUS ONCE
Refills: 0 | Status: COMPLETED | OUTPATIENT
Start: 2020-11-15 | End: 2020-11-15

## 2020-11-15 RX ADMIN — Medication 9 UNIT(S): at 16:55

## 2020-11-15 RX ADMIN — OXYCODONE HYDROCHLORIDE 5 MILLIGRAM(S): 5 TABLET ORAL at 12:47

## 2020-11-15 RX ADMIN — Medication 650 MILLIGRAM(S): at 14:55

## 2020-11-15 RX ADMIN — Medication 50 MILLIGRAM(S): at 05:56

## 2020-11-15 RX ADMIN — CHLORHEXIDINE GLUCONATE 1 APPLICATION(S): 213 SOLUTION TOPICAL at 05:56

## 2020-11-15 RX ADMIN — FAMOTIDINE 20 MILLIGRAM(S): 10 INJECTION INTRAVENOUS at 05:56

## 2020-11-15 RX ADMIN — Medication 12.5 GRAM(S): at 22:12

## 2020-11-15 RX ADMIN — OXYCODONE HYDROCHLORIDE 5 MILLIGRAM(S): 5 TABLET ORAL at 12:17

## 2020-11-15 RX ADMIN — OXYCODONE HYDROCHLORIDE 5 MILLIGRAM(S): 5 TABLET ORAL at 05:56

## 2020-11-15 RX ADMIN — ATORVASTATIN CALCIUM 40 MILLIGRAM(S): 80 TABLET, FILM COATED ORAL at 22:12

## 2020-11-15 RX ADMIN — Medication 650 MILLIGRAM(S): at 08:40

## 2020-11-15 RX ADMIN — Medication 650 MILLIGRAM(S): at 08:10

## 2020-11-15 RX ADMIN — INSULIN GLARGINE 40 UNIT(S): 100 INJECTION, SOLUTION SUBCUTANEOUS at 23:36

## 2020-11-15 RX ADMIN — OXYCODONE HYDROCHLORIDE 5 MILLIGRAM(S): 5 TABLET ORAL at 06:55

## 2020-11-15 RX ADMIN — Medication 75 MILLIGRAM(S): at 16:55

## 2020-11-15 RX ADMIN — FAMOTIDINE 20 MILLIGRAM(S): 10 INJECTION INTRAVENOUS at 16:55

## 2020-11-15 RX ADMIN — Medication 81 MILLIGRAM(S): at 08:10

## 2020-11-15 RX ADMIN — Medication 10 UNIT(S): at 11:59

## 2020-11-15 RX ADMIN — Medication 650 MILLIGRAM(S): at 15:25

## 2020-11-15 RX ADMIN — Medication 10 UNIT(S): at 08:10

## 2020-11-15 NOTE — PROGRESS NOTE ADULT - SUBJECTIVE AND OBJECTIVE BOX
Subjective: Patient seen and examined. No new events except as noted.     SUBJECTIVE/ROS:  resting  NAD      MEDICATIONS:  MEDICATIONS  (STANDING):  aspirin enteric coated 81 milliGRAM(s) Oral daily  aspirin Suppository 300 milliGRAM(s) Rectal once  atorvastatin 40 milliGRAM(s) Oral at bedtime  chlorhexidine 2% Cloths 1 Application(s) Topical <User Schedule>  dextrose 40% Gel 15 Gram(s) Oral once  dextrose 40% Gel 15 Gram(s) Oral once  dextrose 5%. 1000 milliLiter(s) (100 mL/Hr) IV Continuous <Continuous>  dextrose 5%. 1000 milliLiter(s) (50 mL/Hr) IV Continuous <Continuous>  dextrose 50% Injectable 25 Gram(s) IV Push once  dextrose 50% Injectable 12.5 Gram(s) IV Push once  dextrose 50% Injectable 25 Gram(s) IV Push once  enoxaparin Injectable 40 milliGRAM(s) SubCutaneous daily  famotidine Injectable 20 milliGRAM(s) IV Push every 12 hours  glucagon  Injectable 1 milliGRAM(s) IntraMuscular once  insulin glargine Injectable (LANTUS) 34 Unit(s) SubCutaneous at bedtime  insulin lispro (ADMELOG) corrective regimen sliding scale   SubCutaneous three times a day before meals  insulin lispro (ADMELOG) corrective regimen sliding scale   SubCutaneous at bedtime  insulin lispro Injectable (ADMELOG) 10 Unit(s) SubCutaneous three times a day before meals  metoprolol tartrate 50 milliGRAM(s) Oral two times a day  polyethylene glycol 3350 17 Gram(s) Oral daily  sodium chloride 0.9%. 1000 milliLiter(s) (10 mL/Hr) IV Continuous <Continuous>      PHYSICAL EXAM:  T(C): 36.8 (11-15-20 @ 05:52), Max: 36.8 (11-15-20 @ 05:52)  HR: 82 (11-15-20 @ 05:52) (78 - 82)  BP: 150/82 (11-15-20 @ 05:52) (149/81 - 150/82)  RR: 18 (11-15-20 @ 05:52) (18 - 18)  SpO2: 98% (11-15-20 @ 05:52) (98% - 99%)  Wt(kg): --  I&O's Summary    14 Nov 2020 07:01  -  15 Nov 2020 07:00  --------------------------------------------------------  IN: 780 mL / OUT: 2450 mL / NET: -1670 mL            JVP: Normal  Neck: supple  Lung: clear   CV: S1 S2 , Murmur:  Abd: soft  Ext: No edema  neuro: Awake / alert  Psych: flat affect  Skin: normal``    LABS/DATA:    CARDIAC MARKERS:                                9.8    11.25 )-----------( 225      ( 14 Nov 2020 09:42 )             31.8     11-14    136  |  103  |  26<H>  ----------------------------<  196<H>  4.2   |  24  |  1.38<H>    Ca    8.6      14 Nov 2020 09:42      proBNP:   Lipid Profile:   HgA1c:   TSH:     TELE:  EKG:

## 2020-11-15 NOTE — PROGRESS NOTE ADULT - PROBLEM SELECTOR PLAN 1
Will increase Lantus to 40 units at bed time.  Will decrease Humalog to 9 units before each meal in addition to Humalog correction scale coverage.  Patient counseled for compliance with consistent low carb diet.

## 2020-11-15 NOTE — PROGRESS NOTE ADULT - ASSESSMENT
80F, Latvian speaking, with a PMH of renal insufficiency, HTN, DVT (on Eliquis 5mg BID, unsure of last dose prior to admission), CHF, DMII, HLD, obesity, R breast cancer s/p mastectomy, colon cancer s/p resection, rectal cancer s/p chemo and radiation, lymphedema of arm, presenting with R hip pain s/p mechanical fall 3 weeks prior to admission. Discussed case with daughter Shauna as well. Pt has been walking since her fall 3 weeks ago with consistent pain. Pt is able to bear weight and walks with assistive devices. Pt presented to ED with shortness of breath as well. Admitted to orthopedic surgery, planned for total hip replacement if medically cleared. Cardiology stated that based on current ACC/AHA guidelines, patient history and physical exam, the patient is considered to have elevated risk given her abn stress test, sob and abn EKG, and recommended that pt needs cath prior to surgery. Cath showed 60% stenosis of pLAD, 90% stenosis of Cx and 90% stenosis of RCA. TTE showed normal LV function. CT surgery was consulted for CABG evaluation w/ Dr. Heath.    11/5: Pt seen and evaluated at bedside - NICO, NAD. Pt is amenable to CT surgery at this time. CT surgery pre-op workup in progress. D/w Dr. Heath - pt scheduled for OR Monday 11/9. Recommend initiating ASA 81 mg QD pre-op and continuation of Statin and beta blocker.  11/7: preop workup in progress; continue asa/ statin/b-blockers, mrsa nares- on bactroban, ck covid in am, OHS 11/9 with Dr. Heath   11/8: VSS, COVID PCR sent, lovenox dc'd. OR Mon 2nd case.   11/9: S/p CABG (LIMA to LAD, SVG to RCA)  11/10: Extubated successfully, transferred to step down. Continue Lovenox, ASA/statin, lopressor. Continue insulin gtt. PT recommending subacute rehab  11/11: Will administer warfarin 5mg. Cr uptrending to 1.47. Will remove both CT today. Will wean off insulin gtt today.  Endo consult.  D/c chest tubes intro chen.  Hold on floor transfer 2/2 insulin infusionpt is afebrile. will ck u/a BB increased to 50,m  11/12 VSS - pt refused blood draw this am - instructed on importance of blood draws explained to pt.  rounds made w/ Dr. Heath   wbc increasing will ck u/a- lopressor increased to 50mg po bid - d/c plan rehab  11/13 PW removed. INR 1.62 Coumadin 5mg tonight Pending insurance authorization for rehab.  Creat 1.45  11/14 Creatinine improved,  Coumadin 5 mg for INR 1.84 Lopressor increased 50mg bid.   11/15 Creat wnl, Hold coumadin for INR 2.67. BBlocker increased. Ck UA, and cxr. Monitor wbc. afebrile

## 2020-11-15 NOTE — PROGRESS NOTE ADULT - ASSESSMENT
Seen and examined at bedside . Denies sob.     acetaminophen   Tablet .. 650 milliGRAM(s) Oral every 6 hours PRN  aspirin enteric coated 81 milliGRAM(s) Oral daily  atorvastatin 40 milliGRAM(s) Oral at bedtime  chlorhexidine 2% Cloths 1 Application(s) Topical <User Schedule>  dextrose 40% Gel 15 Gram(s) Oral once  dextrose 40% Gel 15 Gram(s) Oral once  dextrose 5%. 1000 milliLiter(s) IV Continuous <Continuous>  dextrose 5%. 1000 milliLiter(s) IV Continuous <Continuous>  dextrose 50% Injectable 25 Gram(s) IV Push once  dextrose 50% Injectable 12.5 Gram(s) IV Push once  dextrose 50% Injectable 25 Gram(s) IV Push once  famotidine Injectable 20 milliGRAM(s) IV Push every 12 hours  glucagon  Injectable 1 milliGRAM(s) IntraMuscular once  insulin glargine Injectable (LANTUS) 40 Unit(s) SubCutaneous at bedtime  insulin lispro (ADMELOG) corrective regimen sliding scale   SubCutaneous three times a day before meals  insulin lispro (ADMELOG) corrective regimen sliding scale   SubCutaneous at bedtime  insulin lispro Injectable (ADMELOG) 9 Unit(s) SubCutaneous three times a day before meals  metoprolol tartrate 75 milliGRAM(s) Oral two times a day  oxyCODONE    IR 5 milliGRAM(s) Oral every 6 hours PRN  polyethylene glycol 3350 17 Gram(s) Oral daily  sodium chloride 0.9%. 1000 milliLiter(s) IV Continuous <Continuous>      VITAL:  T(C): , Max: 36.8 (11-15-20 @ 05:52)  T(F): , Max: 98.3 (11-15-20 @ 05:52)  HR: 82 (11-15-20 @ 05:52)  BP: 150/82 (11-15-20 @ 05:52)  BP(mean): --  RR: 18 (11-15-20 @ 05:52)  SpO2: 98% (11-15-20 @ 05:52)  Wt(kg): --    11-14-20 @ 07:01  -  11-15-20 @ 07:00  --------------------------------------------------------  IN: 780 mL / OUT: 2450 mL / NET: -1670 mL    11-15-20 @ 07:01  -  11-15-20 @ 14:21  --------------------------------------------------------  IN: 0 mL / OUT: 350 mL / NET: -350 mL        PHYSICAL EXAM:  Constitutional: NAD  Neck:  No JVD  Respiratory: CTAB/L  Cardiovascular: S1 and S2  Gastrointestinal: BS+, soft, NT/ND  Extremities: +  peripheral edema  Neurological: A/O x 3, no focal deficits  Psychiatric: Normal mood, normal affect  : No [place]  Skin: No rashes  Access: Not applicable    LABS:                          10.4   14.91 )-----------( 287      ( 15 Nov 2020 09:46 )             33.8     Na(139)/K(4.3)/Cl(102)/HCO3(25)/BUN(22)/Cr(1.28)Glu(167)/Ca(8.9)/Mg(2.0)/PO4(2.6)    11-15 @ 09:46  Na(136)/K(4.2)/Cl(103)/HCO3(24)/BUN(26)/Cr(1.38)Glu(196)/Ca(8.6)/Mg(--)/PO4(--)    11-14 @ 09:42  Na(137)/K(4.8)/Cl(102)/HCO3(23)/BUN(28)/Cr(1.45)Glu(207)/Ca(9.1)/Mg(--)/PO4(--)    11-13 @ 09:44            ASSESSMENT/PLAN    80F w/ HTN, DM2, CKD3a, and HFpEF, 11/2/20 a/w right hip fracture s/p mechanical fall    (1)Renal - stage 3b with non-nephrotic range proteinuria; due to DM/HTN. No evidence to date of TAO, s/p CABG POD # 6     Azotemia resolving/ resolved    (2)Hypophosphatemia - resolving/ resolved s/p repletion    (3)Lytes- controlled    (4)Anemia-hgb improving      (5)Ortho - right hip fracture -        RECOMMEND:  (1)No renal objection to lasix at present (Lasix 40mg po qd) but maintaining negative balance on her own   (2)Dose new meds for GFR 35-45ml/min (present dosing acceptable)  (3)Ambulate at present     Yevgeniy Castelan NP-BC  [facility]  [phone]

## 2020-11-15 NOTE — PROGRESS NOTE ADULT - ASSESSMENT
Assessment  DMT2: 80y Female with DM T2 with hyperglycemia, A1C 7.5%, was on oral meds at home, on basal bolus insulin, blood sugars running trending down postprandially but fasting is not at target, no hypoglycemic episodes, eating meals, appears comfortable.  CAD: s/p CABG, on medications, stable, monitored.  Hyperthyroidism: Patient has no history thyroid disease, was not on any thyroid supplements, subclinical, asymptomatic.      Dior Monroy MD  Cell: [phone]  Office: [phone]

## 2020-11-15 NOTE — PROGRESS NOTE ADULT - ASSESSMENT
CAD  s/p bypass  stable   asa   statin   fu with CTS     HTN  stable    CKD  monitor lytes   stable crt     DVT  on a/c with coumadin as per primary team

## 2020-11-15 NOTE — PROGRESS NOTE ADULT - SUBJECTIVE AND OBJECTIVE BOX
VITAL SIGNS    Telemetry:  SR 70-90 18 beats wct yesterday  Vital Signs Last 24 Hrs  T(C): 36.8 (11-15-20 @ 05:52), Max: 36.8 (11-15-20 @ 05:52)  T(F): 98.3 (11-15-20 @ 05:52), Max: 98.3 (11-15-20 @ 05:52)  HR: 82 (11-15-20 @ 05:52) (78 - 82)  BP: 150/82 (11-15-20 @ 05:52) (149/81 - 150/82)  RR: 18 (11-15-20 @ 05:52) (18 - 18)  SpO2: 98% (11-15-20 @ 05:52) (98% - 99%)             @ 07:01  -  11-15 @ 07:00  --------------------------------------------------------  IN: 780 mL / OUT: 2450 mL / NET: -1670 mL       Daily     Daily Weight in k.3 (15 Nov 2020 08:32)  Admit Wt: Drug Dosing Weight  Height (cm): 152.4 (2020 14:38)  Weight (kg): 89.4 (2020 14:38)  BMI (kg/m2): 38.5 (2020 14:38)  BSA (m2): 1.86 (2020 14:38)      CAPILLARY BLOOD GLUCOSE      POCT Blood Glucose.: 140 mg/dL (15 Nov 2020 11:36)  POCT Blood Glucose.: 98 mg/dL (15 Nov 2020 07:42)  POCT Blood Glucose.: 110 mg/dL (2020 22:32)  POCT Blood Glucose.: 80 mg/dL (2020 21:46)  POCT Blood Glucose.: 147 mg/dL (2020 16:42)  POCT Blood Glucose.: 141 mg/dL (2020 12:36)          MEDICATIONS  acetaminophen   Tablet .. 650 milliGRAM(s) Oral every 6 hours PRN  aspirin enteric coated 81 milliGRAM(s) Oral daily  atorvastatin 40 milliGRAM(s) Oral at bedtime  chlorhexidine 2% Cloths 1 Application(s) Topical <User Schedule>    famotidine Injectable 20 milliGRAM(s) IV Push every 12 hours  glucagon  Injectable 1 milliGRAM(s) IntraMuscular once  insulin glargine Injectable (LANTUS) 34 Unit(s) SubCutaneous at bedtime  insulin lispro (ADMELOG) corrective regimen sliding scale   SubCutaneous three times a day before meals  insulin lispro (ADMELOG) corrective regimen sliding scale   SubCutaneous at bedtime  insulin lispro Injectable (ADMELOG) 10 Unit(s) SubCutaneous three times a day before meals  metoprolol tartrate 75 milliGRAM(s) Oral two times a day  oxyCODONE    IR 5 milliGRAM(s) Oral every 6 hours PRN  polyethylene glycol 3350 17 Gram(s) Oral daily  sodium chloride 0.9%. 1000 milliLiter(s) IV Continuous <Continuous>      >>> <<<  PHYSICAL EXAM  Subjective: NAd OOB to chair  Neurology: alert and oriented x 3, nonfocal, no gross deficits  CV : s1s2  Sternal Wound :  CDI , Stable  Lungs: CTA  Abdomen: soft, NT,ND, (+ )BM  :  voiding  Extremities:  + 1 edema rle , mildly erythematous. no calf tenderness     LABS  11-15    139  |  102  |  22  ----------------------------<  167<H>  4.3   |  25  |  1.28    Ca    8.9      15 Nov 2020 09:46  Phos  2.6     11-15  Mg     2.0     11-15                                   10.4   14.91 )-----------( 287      ( 15 Nov 2020 09:46 )             33.8          PT/INR - ( 15 Nov 2020 09:46 )   PT: 30.6 sec;   INR: 2.67 ratio         PTT - ( 15 Nov 2020 09:46 )  PTT:35.2 sec       PAST MEDICAL & SURGICAL HISTORY:  Renal insufficiency    DVT of leg (deep venous thrombosis)  right calf DVT  2019 pt on Eliquis    CHF (congestive heart failure)    Type II diabetes mellitus    Obese    Rectal cancer  s/p chemotherapy and  radiation 12 weeks 2015 -3/2015    Hyperlipidemia    Lymphedema of arm  right arm s/p mastectomy    HTN (hypertension)    Breast CA, right   s/p mastectomy ,IV Chemotherapy    S/P TKR (total knee replacement), right  2017    S/P colon resection      S/P ileostomy  low anterior resection-8/10/15, reversal of ileostomy     History of appendectomy      S/P mastectomy, right  1989

## 2020-11-15 NOTE — PROGRESS NOTE ADULT - SUBJECTIVE AND OBJECTIVE BOX
Chief complaint  Patient is a 80y old  Female who presents with a chief complaint of CAD (15 Nov 2020 11:47)   Review of systems  Patient in bed, appears comfortable.    Labs and Fingersticks  CAPILLARY BLOOD GLUCOSE      POCT Blood Glucose.: 102 mg/dL (15 Nov 2020 16:31)  POCT Blood Glucose.: 140 mg/dL (15 Nov 2020 11:36)  POCT Blood Glucose.: 98 mg/dL (15 Nov 2020 07:42)  POCT Blood Glucose.: 110 mg/dL (14 Nov 2020 22:32)  POCT Blood Glucose.: 80 mg/dL (14 Nov 2020 21:46)      Anion Gap, Serum: 12 (11-15 @ 09:46)  Anion Gap, Serum: 9 (11-14 @ 09:42)      Calcium, Total Serum: 8.9 (11-15 @ 09:46)  Calcium, Total Serum: 8.6 (11-14 @ 09:42)          11-15    139  |  102  |  22  ----------------------------<  167<H>  4.3   |  25  |  1.28    Ca    8.9      15 Nov 2020 09:46  Phos  2.6     11-15  Mg     2.0     11-15                          10.4   14.91 )-----------( 287      ( 15 Nov 2020 09:46 )             33.8     Medications  MEDICATIONS  (STANDING):  aspirin enteric coated 81 milliGRAM(s) Oral daily  atorvastatin 40 milliGRAM(s) Oral at bedtime  chlorhexidine 2% Cloths 1 Application(s) Topical <User Schedule>  dextrose 40% Gel 15 Gram(s) Oral once  dextrose 40% Gel 15 Gram(s) Oral once  dextrose 5%. 1000 milliLiter(s) (50 mL/Hr) IV Continuous <Continuous>  dextrose 5%. 1000 milliLiter(s) (100 mL/Hr) IV Continuous <Continuous>  dextrose 50% Injectable 25 Gram(s) IV Push once  dextrose 50% Injectable 12.5 Gram(s) IV Push once  dextrose 50% Injectable 25 Gram(s) IV Push once  famotidine Injectable 20 milliGRAM(s) IV Push every 12 hours  glucagon  Injectable 1 milliGRAM(s) IntraMuscular once  insulin glargine Injectable (LANTUS) 40 Unit(s) SubCutaneous at bedtime  insulin lispro (ADMELOG) corrective regimen sliding scale   SubCutaneous three times a day before meals  insulin lispro (ADMELOG) corrective regimen sliding scale   SubCutaneous at bedtime  insulin lispro Injectable (ADMELOG) 9 Unit(s) SubCutaneous three times a day before meals  metoprolol tartrate 75 milliGRAM(s) Oral two times a day  polyethylene glycol 3350 17 Gram(s) Oral daily  sodium chloride 0.9%. 1000 milliLiter(s) (10 mL/Hr) IV Continuous <Continuous>      Physical Exam  General: Patient comfortable in bed  Vital Signs Last 12 Hrs  T(F): 97.6 (11-15-20 @ 14:50), Max: 98.3 (11-15-20 @ 05:52)  HR: 83 (11-15-20 @ 14:50) (82 - 83)  BP: 115/75 (11-15-20 @ 14:50) (115/75 - 150/82)  BP(mean): --  RR: 18 (11-15-20 @ 14:50) (18 - 18)  SpO2: 99% (11-15-20 @ 14:50) (98% - 99%)  Neck: No palpable thyroid nodules.  CVS: S1S2, No murmurs  Respiratory: No wheezing, no crepitations  GI: Abdomen soft, bowel sounds positive  Musculoskeletal:  edema lower extremities.     Diagnostics

## 2020-11-16 ENCOUNTER — NON-APPOINTMENT (OUTPATIENT)
Age: 80
End: 2020-11-16

## 2020-11-16 DIAGNOSIS — Z95.1 PRESENCE OF AORTOCORONARY BYPASS GRAFT: ICD-10-CM

## 2020-11-16 LAB
ANION GAP SERPL CALC-SCNC: 10 MMOL/L — SIGNIFICANT CHANGE UP (ref 5–17)
BUN SERPL-MCNC: 23 MG/DL — SIGNIFICANT CHANGE UP (ref 7–23)
CALCIUM SERPL-MCNC: 8.8 MG/DL — SIGNIFICANT CHANGE UP (ref 8.4–10.5)
CHLORIDE SERPL-SCNC: 102 MMOL/L — SIGNIFICANT CHANGE UP (ref 96–108)
CO2 SERPL-SCNC: 27 MMOL/L — SIGNIFICANT CHANGE UP (ref 22–31)
CREAT SERPL-MCNC: 1.28 MG/DL — SIGNIFICANT CHANGE UP (ref 0.5–1.3)
GLUCOSE BLDC GLUCOMTR-MCNC: 105 MG/DL — HIGH (ref 70–99)
GLUCOSE BLDC GLUCOMTR-MCNC: 112 MG/DL — HIGH (ref 70–99)
GLUCOSE BLDC GLUCOMTR-MCNC: 152 MG/DL — HIGH (ref 70–99)
GLUCOSE BLDC GLUCOMTR-MCNC: 63 MG/DL — LOW (ref 70–99)
GLUCOSE BLDC GLUCOMTR-MCNC: 69 MG/DL — LOW (ref 70–99)
GLUCOSE BLDC GLUCOMTR-MCNC: 73 MG/DL — SIGNIFICANT CHANGE UP (ref 70–99)
GLUCOSE BLDC GLUCOMTR-MCNC: 80 MG/DL — SIGNIFICANT CHANGE UP (ref 70–99)
GLUCOSE BLDC GLUCOMTR-MCNC: 93 MG/DL — SIGNIFICANT CHANGE UP (ref 70–99)
GLUCOSE SERPL-MCNC: 94 MG/DL — SIGNIFICANT CHANGE UP (ref 70–99)
HCT VFR BLD CALC: 32.5 % — LOW (ref 34.5–45)
HGB BLD-MCNC: 10 G/DL — LOW (ref 11.5–15.5)
INR BLD: 2.9 RATIO — HIGH (ref 0.88–1.16)
MAGNESIUM SERPL-MCNC: 2 MG/DL — SIGNIFICANT CHANGE UP (ref 1.6–2.6)
MCHC RBC-ENTMCNC: 29.2 PG — SIGNIFICANT CHANGE UP (ref 27–34)
MCHC RBC-ENTMCNC: 30.8 GM/DL — LOW (ref 32–36)
MCV RBC AUTO: 94.8 FL — SIGNIFICANT CHANGE UP (ref 80–100)
NRBC # BLD: 0 /100 WBCS — SIGNIFICANT CHANGE UP (ref 0–0)
PLATELET # BLD AUTO: 253 K/UL — SIGNIFICANT CHANGE UP (ref 150–400)
POTASSIUM SERPL-MCNC: 4.1 MMOL/L — SIGNIFICANT CHANGE UP (ref 3.5–5.3)
POTASSIUM SERPL-SCNC: 4.1 MMOL/L — SIGNIFICANT CHANGE UP (ref 3.5–5.3)
PROTHROM AB SERPL-ACNC: 33.1 SEC — HIGH (ref 10.6–13.6)
RBC # BLD: 3.43 M/UL — LOW (ref 3.8–5.2)
RBC # FLD: 15.7 % — HIGH (ref 10.3–14.5)
SODIUM SERPL-SCNC: 139 MMOL/L — SIGNIFICANT CHANGE UP (ref 135–145)
WBC # BLD: 11.94 K/UL — HIGH (ref 3.8–10.5)
WBC # FLD AUTO: 11.94 K/UL — HIGH (ref 3.8–10.5)

## 2020-11-16 RX ORDER — INSULIN LISPRO 100/ML
3 VIAL (ML) SUBCUTANEOUS
Refills: 0 | Status: DISCONTINUED | OUTPATIENT
Start: 2020-11-16 | End: 2020-11-17

## 2020-11-16 RX ORDER — INSULIN GLARGINE 100 [IU]/ML
34 INJECTION, SOLUTION SUBCUTANEOUS AT BEDTIME
Refills: 0 | Status: DISCONTINUED | OUTPATIENT
Start: 2020-11-16 | End: 2020-11-17

## 2020-11-16 RX ORDER — OXYCODONE HYDROCHLORIDE 5 MG/1
5 TABLET ORAL EVERY 6 HOURS
Refills: 0 | Status: DISCONTINUED | OUTPATIENT
Start: 2020-11-16 | End: 2020-11-17

## 2020-11-16 RX ORDER — INSULIN LISPRO 100/ML
6 VIAL (ML) SUBCUTANEOUS
Refills: 0 | Status: DISCONTINUED | OUTPATIENT
Start: 2020-11-16 | End: 2020-11-16

## 2020-11-16 RX ORDER — AMLODIPINE BESYLATE 2.5 MG/1
5 TABLET ORAL DAILY
Refills: 0 | Status: DISCONTINUED | OUTPATIENT
Start: 2020-11-16 | End: 2020-11-17

## 2020-11-16 RX ADMIN — Medication 650 MILLIGRAM(S): at 23:15

## 2020-11-16 RX ADMIN — Medication 650 MILLIGRAM(S): at 21:51

## 2020-11-16 RX ADMIN — POLYETHYLENE GLYCOL 3350 17 GRAM(S): 17 POWDER, FOR SOLUTION ORAL at 07:34

## 2020-11-16 RX ADMIN — OXYCODONE HYDROCHLORIDE 5 MILLIGRAM(S): 5 TABLET ORAL at 05:14

## 2020-11-16 RX ADMIN — Medication 75 MILLIGRAM(S): at 17:17

## 2020-11-16 RX ADMIN — Medication 81 MILLIGRAM(S): at 07:33

## 2020-11-16 RX ADMIN — FAMOTIDINE 20 MILLIGRAM(S): 10 INJECTION INTRAVENOUS at 17:19

## 2020-11-16 RX ADMIN — ATORVASTATIN CALCIUM 40 MILLIGRAM(S): 80 TABLET, FILM COATED ORAL at 21:51

## 2020-11-16 RX ADMIN — OXYCODONE HYDROCHLORIDE 5 MILLIGRAM(S): 5 TABLET ORAL at 13:41

## 2020-11-16 RX ADMIN — INSULIN GLARGINE 34 UNIT(S): 100 INJECTION, SOLUTION SUBCUTANEOUS at 21:51

## 2020-11-16 RX ADMIN — Medication 9 UNIT(S): at 07:54

## 2020-11-16 RX ADMIN — OXYCODONE HYDROCHLORIDE 5 MILLIGRAM(S): 5 TABLET ORAL at 12:41

## 2020-11-16 RX ADMIN — FAMOTIDINE 20 MILLIGRAM(S): 10 INJECTION INTRAVENOUS at 05:14

## 2020-11-16 RX ADMIN — Medication 1: at 17:16

## 2020-11-16 RX ADMIN — Medication 3 UNIT(S): at 17:16

## 2020-11-16 RX ADMIN — Medication 0: at 21:51

## 2020-11-16 RX ADMIN — AMLODIPINE BESYLATE 5 MILLIGRAM(S): 2.5 TABLET ORAL at 07:36

## 2020-11-16 RX ADMIN — OXYCODONE HYDROCHLORIDE 5 MILLIGRAM(S): 5 TABLET ORAL at 06:51

## 2020-11-16 RX ADMIN — Medication 75 MILLIGRAM(S): at 05:14

## 2020-11-16 NOTE — PROGRESS NOTE ADULT - ASSESSMENT
80F w/ HTN, DM2, CKD3a, and HFpEF, 11/2/20 a/w right hip fracture s/p mechanical fall    (1)Renal - stage 3a with non-nephrotic range proteinuria; due to DM/HTN. No evidence to date of TAO, s/p CABG      Azotemia resolving/ resolved    (2)Lytes- controlled    (3)Anemia-hgb stable       (5)Ortho - right hip fracture -        RECOMMEND:  (1)No renal objection to lasix at present (Lasix 40mg po qd) but maintaining negative balance on her own   (2)Dose new meds for GFR 40-45ml/min (present dosing acceptable)  (3)Ambulate at present     [facility] [employer]  [phone]       80F w/ HTN, DM2, CKD3a, and HFpEF, 11/2/20 a/w right hip fracture s/p mechanical fall    (1)Renal - stage 3a with non-nephrotic range proteinuria; due to DM/HTN. No evidence to date of TAO, s/p CABG      Azotemia resolving/ resolved    (2)Lytes- controlled    (3)Anemia-hgb stable       (5)Ortho - right hip fracture -        RECOMMEND:  (1)No renal objection to lasix at present but she is autodiuresing and there is not much edema in the LE   (2)Dose new meds for GFR 40-45ml/min (present dosing acceptable)  (3)Ambulate at present     [facility]ayed [employer]  [phone]

## 2020-11-16 NOTE — PROGRESS NOTE ADULT - PROBLEM SELECTOR PLAN 1
Transferred to step down  Pain management  Encourage incentive spirometry and ambulation as tolerated  Continue Lovenox, ASA/statin  Continue Lopressor 25 BID  Will remove chest tubes today endo following  accuchecks ac and hs  diabetic diet  continue lantus and humalog

## 2020-11-16 NOTE — PROGRESS NOTE ADULT - ASSESSMENT
CAD  s/p bypass  stable   asa   statin   fu with CTS     HTN  add amlodipine 5     CKD  monitor lytes   stable crt     DVT  on a/c with coumadin as per primary team

## 2020-11-16 NOTE — PROGRESS NOTE ADULT - ASSESSMENT
Assessment  DMT2: 80y Female with DM T2 with hyperglycemia, A1C 7.5%, was on oral meds at home, now on basal bolus insulin, adjusted dose yesterday, blood sugars trending down, patient had hypoglycemic episode this AM, primary team decreased insulin dose, eating meals, appears comfortable.  CAD: s/p CABG, on medications, stable, monitored.  Hyperthyroidism: Patient has no history thyroid disease, was not on any thyroid supplements, subclinical, asymptomatic.      Dior Monroy MD  Cell: [phone]  Office: [phone]         Assessment  DMT2: 80y Female with DM T2 with hyperglycemia, A1C 7.5%, was on oral meds at home, now on basal bolus insulin, adjusted dose yesterday,  blood sugars trending down, patient had hypoglycemic episode this AM, primary team decreased insulin dose, eating meals, appears comfortable.  CAD: s/p CABG, on medications, stable, monitored.  Hyperthyroidism: Patient has no history thyroid disease, was not on any thyroid supplements, subclinical, asymptomatic.      Dior Monroy MD  Cell: [phone]  Office: [phone]

## 2020-11-16 NOTE — PROGRESS NOTE ADULT - ASSESSMENT
80F, Slovak speaking, with a PMH of renal insufficiency, HTN, DVT (on Eliquis 5mg BID, unsure of last dose prior to admission), CHF, DMII, HLD, obesity, R breast cancer s/p mastectomy, colon cancer s/p resection, rectal cancer s/p chemo and radiation, lymphedema of arm, presenting with R hip pain s/p mechanical fall 3 weeks prior to admission. Discussed case with daughter Shauna as well. Pt has been walking since her fall 3 weeks ago with consistent pain. Pt is able to bear weight and walks with assistive devices. Pt presented to ED with shortness of breath as well. Admitted to orthopedic surgery, planned for total hip replacement if medically cleared. Cardiology stated that based on current ACC/AHA guidelines, patient history and physical exam, the patient is considered to have elevated risk given her abn stress test, sob and abn EKG, and recommended that pt needs cath prior to surgery. Cath showed 60% stenosis of pLAD, 90% stenosis of Cx and 90% stenosis of RCA. TTE showed normal LV function. CT surgery was consulted for CABG evaluation w/ Dr. Heath.    11/5: Pt seen and evaluated at bedside - NICO, NAD. Pt is amenable to CT surgery at this time. CT surgery pre-op workup in progress. D/w Dr. Heath - pt scheduled for OR Monday 11/9. Recommend initiating ASA 81 mg QD pre-op and continuation of Statin and beta blocker.  11/7: preop workup in progress; continue asa/ statin/b-blockers, mrsa nares- on bactroban, ck covid in am, OHS 11/9 with Dr. Heath   11/8: VSS, COVID PCR sent, lovenox dc'd. OR Mon 2nd case.   11/9: S/p CABG (LIMA to LAD, SVG to RCA)  11/10: Extubated successfully, transferred to step down. Continue Lovenox, ASA/statin, lopressor. Continue insulin gtt. PT recommending subacute rehab  11/11: Will administer warfarin 5mg. Cr uptrending to 1.47. Will remove both CT today. Will wean off insulin gtt today.  Endo consult.  D/c chest tubes intro chen.  Hold on floor transfer 2/2 insulin infusionpt is afebrile. will ck u/a BB increased to 50,m  11/12 VSS - pt refused blood draw this am - instructed on importance of blood draws explained to pt.  rounds made w/ Dr. Heath   wbc increasing will ck u/a- lopressor increased to 50mg po bid - d/c plan rehab  11/13 PW removed. INR 1.62 Coumadin 5mg tonight Pending insurance authorization for rehab.  Creat 1.45  11/14 Creatinine improved,  Coumadin 5 mg for INR 1.84 Lopressor increased 50mg bid.   11/15 Creat wnl, Hold coumadin for INR 2.67. BBlocker increased. Ck UA, and cxr. Monitor wbc. afebrile   80F, Lao speaking, with a PMH of renal insufficiency, HTN, DVT (on Eliquis 5mg BID, unsure of last dose prior to admission), CHF, DMII, HLD, obesity, R breast cancer s/p mastectomy, colon cancer s/p resection, rectal cancer s/p chemo and radiation, lymphedema of arm, presenting with R hip pain s/p mechanical fall 3 weeks prior to admission. Discussed case with daughter Shauna as well. Pt has been walking since her fall 3 weeks ago with consistent pain. Pt is able to bear weight and walks with assistive devices. Pt presented to ED with shortness of breath as well. Admitted to orthopedic surgery, planned for total hip replacement if medically cleared. Cardiology stated that based on current ACC/AHA guidelines, patient history and physical exam, the patient is considered to have elevated risk given her abn stress test, sob and abn EKG, and recommended that pt needs cath prior to surgery. Cath showed 60% stenosis of pLAD, 90% stenosis of Cx and 90% stenosis of RCA. TTE showed normal LV function. CT surgery was consulted for CABG evaluation w/ Dr. Heath.  11/5: Pt seen and evaluated at bedside - NICO, NAD. Pt is amenable to CT surgery at this time. CT surgery pre-op workup in progress. D/w Dr. Heath - pt scheduled for OR Monday 11/9. Recommend initiating ASA 81 mg QD pre-op and continuation of Statin and beta blocker.  11/7: preop workup in progress; continue asa/ statin/b-blockers, mrsa nares- on bactroban, ck covid in am, OHS 11/9 with Dr. Heath   11/8: VSS, COVID PCR sent, lovenox dc'd. OR Mon 2nd case.   11/9: S/p CABG (LIMA to LAD, SVG to RCA)  11/10: Extubated successfully, transferred to step down. Continue Lovenox, ASA/statin, lopressor. Continue insulin gtt. PT recommending subacute rehab  11/11: Will administer warfarin 5mg. Cr uptrending to 1.47. Will remove both CT today. Will wean off insulin gtt today.  Endo consult.  D/c chest tubes intro chen.  Hold on floor transfer 2/2 insulin infusionpt is afebrile. will ck u/a BB increased to 50,m  11/12 VSS - pt refused blood draw this am - instructed on importance of blood draws explained to pt.  rounds made w/ Dr. Heath   wbc increasing will ck u/a- lopressor increased to 50mg po bid - d/c plan rehab  11/13 PW removed. INR 1.62 Coumadin 5mg tonight Pending insurance authorization for rehab.  Creat 1.45  11/14 Creatinine improved,  Coumadin 5 mg for INR 1.84 Lopressor increased 50mg bid.   11/15 Creat wnl, Hold coumadin for INR 2.67. BBlocker increased. Ck UA, and cxr. Monitor wbc. afebrile  11/16 VSS; INR 2.9 today - hold coumadin today; lantus and humalog adjusted as per ramón for hypoglycemia  Discharge planning- rehab tue if bs and INR stable

## 2020-11-16 NOTE — PROGRESS NOTE ADULT - PROBLEM SELECTOR PLAN 3
ck glucose ac/hs  diabetic diet continue postop care  continue asa/ statin/ lopressor 75 mg po bid  pulm toilet continue postop care  continue asa/ statin/ lopressor 75 mg po bid  anticoagulation with coumadin for hx dvt   pulm toilet  pain management  increase activity as tolerated  discharge planning- rehab tue if bs and INR stable

## 2020-11-16 NOTE — PROGRESS NOTE ADULT - PROBLEM SELECTOR PLAN 1
Will decrease Lantus to 34 units at bed time.  Will continue decreased-dose Humalog 3units before each meal as well as Humalog correction scale coverage. Will continue monitoring FS and FU.  Suggest to continue insulin administration teaching. Will continue monitoring FS/insulin requirement and make recommendations for outpatient management prior to DC.  Patient counseled for compliance with consistent low carb diet.

## 2020-11-16 NOTE — PROGRESS NOTE ADULT - SUBJECTIVE AND OBJECTIVE BOX
VITAL SIGNS    Telemetry:    Vital Signs Last 24 Hrs  T(C): 36.6 (20 @ 05:09), Max: 36.6 (11-15-20 @ 20:14)  T(F): 97.9 (20 @ 05:09), Max: 97.9 (11-15-20 @ 20:14)  HR: 80 (20:22) (70 - 83)  BP: 131/79 (20 @ 09:22) (115/75 - 172/90)  RR: 18 (20 @ 09:22) (18 - 18)  SpO2: 98% (20 @ :22) (96% - 99%)            11-15 @ 07:01  -   @ 07:00  --------------------------------------------------------  IN: 800 mL / OUT: 2150 mL / NET: -1350 mL     @ 07:01  -   @ 10:49  --------------------------------------------------------  IN: 200 mL / OUT: 200 mL / NET: 0 mL       Daily     Daily Weight in k.3 (2020 09:22)  Admit Wt: Drug Dosing Weight  Height (cm): 152.4 (2020 14:38)  Weight (kg): 86.3 (2020 09:22)  BMI (kg/m2): 37.2 (2020 09:22)  BSA (m2): 1.83 (2020 09:22)      CAPILLARY BLOOD GLUCOSE      POCT Blood Glucose.: 73 mg/dL (2020 07:33)  POCT Blood Glucose.: 112 mg/dL (2020 02:01)  POCT Blood Glucose.: 111 mg/dL (15 Nov 2020 22:52)  POCT Blood Glucose.: 63 mg/dL (15 Nov 2020 22:04)  POCT Blood Glucose.: 63 mg/dL (15 Nov 2020 21:47)  POCT Blood Glucose.: 58 mg/dL (15 Nov 2020 21:45)  POCT Blood Glucose.: 102 mg/dL (15 Nov 2020 16:31)  POCT Blood Glucose.: 140 mg/dL (15 Nov 2020 11:36)          acetaminophen   Tablet .. 650 milliGRAM(s) Oral every 6 hours PRN  amLODIPine   Tablet 5 milliGRAM(s) Oral daily  aspirin enteric coated 81 milliGRAM(s) Oral daily  atorvastatin 40 milliGRAM(s) Oral at bedtime  chlorhexidine 2% Cloths 1 Application(s) Topical <User Schedule>  dextrose 40% Gel 15 Gram(s) Oral once  dextrose 40% Gel 15 Gram(s) Oral once  dextrose 5%. 1000 milliLiter(s) IV Continuous <Continuous>  dextrose 5%. 1000 milliLiter(s) IV Continuous <Continuous>  dextrose 50% Injectable 25 Gram(s) IV Push once  dextrose 50% Injectable 12.5 Gram(s) IV Push once  dextrose 50% Injectable 25 Gram(s) IV Push once  famotidine Injectable 20 milliGRAM(s) IV Push every 12 hours  glucagon  Injectable 1 milliGRAM(s) IntraMuscular once  insulin glargine Injectable (LANTUS) 34 Unit(s) SubCutaneous at bedtime  insulin lispro (ADMELOG) corrective regimen sliding scale   SubCutaneous three times a day before meals  insulin lispro (ADMELOG) corrective regimen sliding scale   SubCutaneous at bedtime  insulin lispro Injectable (ADMELOG) 6 Unit(s) SubCutaneous three times a day before meals  metoprolol tartrate 75 milliGRAM(s) Oral two times a day  oxyCODONE    IR 5 milliGRAM(s) Oral every 6 hours PRN  polyethylene glycol 3350 17 Gram(s) Oral daily  sodium chloride 0.9%. 1000 milliLiter(s) IV Continuous <Continuous>      PHYSICAL EXAM    Subjective: "Hi.   Neurology: alert and oriented x 3, nonfocal, no gross deficits  CV : tele:  RSR  Sternal Wound :  CDI with dressing , Stable  Lungs: clear. RR easy, unlabored   Abdomen: soft, nontender, nondistended, positive bowel sounds, bowel movement   Neg N/V/D   :  pt voiding without difficulty   Extremities:   CUEVAS; edema, neg calf tenderness.   PPP bilaterally      PW:  Chest tubes:                 VITAL SIGNS    Telemetry:  RSR 60-80   Vital Signs Last 24 Hrs  T(C): 36.6 (20 @ 05:09), Max: 36.6 (11-15-20 @ 20:14)  T(F): 97.9 (20 @ 05:09), Max: 97.9 (11-15-20 @ 20:14)  HR: 80 (20 @ :22) (70 - 83)  BP: 131/79 (20 @ 09:22) (115/75 - 172/90)  RR: 18 (20 @ 09:22) (18 - 18)  SpO2: 98% (20 @ 09:22) (96% - 99%)            11-15 @ 07:01  -   @ 07:00  --------------------------------------------------------  IN: 800 mL / OUT: 2150 mL / NET: -1350 mL     @ 07:01  -   @ 10:49  --------------------------------------------------------  IN: 200 mL / OUT: 200 mL / NET: 0 mL       Daily     Daily Weight in k.3 (2020 09:22)  Admit Wt: Drug Dosing Weight  Height (cm): 152.4 (2020 14:38)  Weight (kg): 86.3 (2020 09:22)  BMI (kg/m2): 37.2 (2020 09:22)  BSA (m2): 1.83 (:22)      CAPILLARY BLOOD GLUCOSE      POCT Blood Glucose.: 73 mg/dL (2020 07:33)  POCT Blood Glucose.: 112 mg/dL (2020 02:01)  POCT Blood Glucose.: 111 mg/dL (15 Nov 2020 22:52)  POCT Blood Glucose.: 63 mg/dL (15 Nov 2020 22:04)  POCT Blood Glucose.: 63 mg/dL (15 Nov 2020 21:47)  POCT Blood Glucose.: 58 mg/dL (15 Nov 2020 21:45)  POCT Blood Glucose.: 102 mg/dL (15 Nov 2020 16:31)  POCT Blood Glucose.: 140 mg/dL (15 Nov 2020 11:36)          acetaminophen   Tablet .. 650 milliGRAM(s) Oral every 6 hours PRN  amLODIPine   Tablet 5 milliGRAM(s) Oral daily  aspirin enteric coated 81 milliGRAM(s) Oral daily  atorvastatin 40 milliGRAM(s) Oral at bedtime  chlorhexidine 2% Cloths 1 Application(s) Topical <User Schedule>  dextrose 40% Gel 15 Gram(s) Oral once  dextrose 40% Gel 15 Gram(s) Oral once  dextrose 5%. 1000 milliLiter(s) IV Continuous <Continuous>  dextrose 5%. 1000 milliLiter(s) IV Continuous <Continuous>  dextrose 50% Injectable 25 Gram(s) IV Push once  dextrose 50% Injectable 12.5 Gram(s) IV Push once  dextrose 50% Injectable 25 Gram(s) IV Push once  famotidine Injectable 20 milliGRAM(s) IV Push every 12 hours  glucagon  Injectable 1 milliGRAM(s) IntraMuscular once  insulin glargine Injectable (LANTUS) 34 Unit(s) SubCutaneous at bedtime  insulin lispro (ADMELOG) corrective regimen sliding scale   SubCutaneous three times a day before meals  insulin lispro (ADMELOG) corrective regimen sliding scale   SubCutaneous at bedtime  insulin lispro Injectable (ADMELOG) 6 Unit(s) SubCutaneous three times a day before meals  metoprolol tartrate 75 milliGRAM(s) Oral two times a day  oxyCODONE    IR 5 milliGRAM(s) Oral every 6 hours PRN  polyethylene glycol 3350 17 Gram(s) Oral daily  sodium chloride 0.9%. 1000 milliLiter(s) IV Continuous <Continuous>      PHYSICAL EXAM    Subjective: "Am I leaving today?"   Neurology: alert and oriented x 3, nonfocal, no gross deficits  CV : tele:  RSR 60-80  Sternal Wound :  CDI ANA PAULA - sternum stable   Lungs: clear diminished at the bases. RR easy, unlabored   Abdomen: soft, nontender, nondistended, positive bowel sounds, + bowel movement   Neg N/V/D; obese abdomen    :  pt voiding without difficulty   Extremities:   CUEVAS; trace LE edema, neg calf tenderness.   PPP bilaterally      PW: no   Chest tubes: none

## 2020-11-16 NOTE — PROGRESS NOTE ADULT - SUBJECTIVE AND OBJECTIVE BOX
Chief complaint  Patient is a 80y old  Female who presents with a chief complaint of CAD (15 Nov 2020 11:47)   Review of systems  Patient in bed, looks comfortable, had hypoglycemic episodes.    Labs and Fingersticks  CAPILLARY BLOOD GLUCOSE      POCT Blood Glucose.: 105 mg/dL (16 Nov 2020 12:02)  POCT Blood Glucose.: 80 mg/dL (16 Nov 2020 11:48)  POCT Blood Glucose.: 63 mg/dL (16 Nov 2020 11:26)  POCT Blood Glucose.: 69 mg/dL (16 Nov 2020 11:25)  POCT Blood Glucose.: 73 mg/dL (16 Nov 2020 07:33)  POCT Blood Glucose.: 112 mg/dL (16 Nov 2020 02:01)  POCT Blood Glucose.: 111 mg/dL (15 Nov 2020 22:52)  POCT Blood Glucose.: 63 mg/dL (15 Nov 2020 22:04)  POCT Blood Glucose.: 63 mg/dL (15 Nov 2020 21:47)  POCT Blood Glucose.: 58 mg/dL (15 Nov 2020 21:45)  POCT Blood Glucose.: 102 mg/dL (15 Nov 2020 16:31)      Anion Gap, Serum: 10 (11-16 @ 05:44)  Anion Gap, Serum: 12 (11-15 @ 09:46)      Calcium, Total Serum: 8.8 (11-16 @ 05:44)  Calcium, Total Serum: 8.9 (11-15 @ 09:46)          11-16    139  |  102  |  23  ----------------------------<  94  4.1   |  27  |  1.28    Ca    8.8      16 Nov 2020 05:44  Phos  2.6     11-15  Mg     2.0     11-16                          10.0   11.94 )-----------( 253      ( 16 Nov 2020 05:44 )             32.5     Medications  MEDICATIONS  (STANDING):  amLODIPine   Tablet 5 milliGRAM(s) Oral daily  aspirin enteric coated 81 milliGRAM(s) Oral daily  atorvastatin 40 milliGRAM(s) Oral at bedtime  chlorhexidine 2% Cloths 1 Application(s) Topical <User Schedule>  dextrose 40% Gel 15 Gram(s) Oral once  dextrose 40% Gel 15 Gram(s) Oral once  dextrose 5%. 1000 milliLiter(s) (50 mL/Hr) IV Continuous <Continuous>  dextrose 5%. 1000 milliLiter(s) (100 mL/Hr) IV Continuous <Continuous>  dextrose 50% Injectable 25 Gram(s) IV Push once  dextrose 50% Injectable 12.5 Gram(s) IV Push once  dextrose 50% Injectable 25 Gram(s) IV Push once  famotidine Injectable 20 milliGRAM(s) IV Push every 12 hours  glucagon  Injectable 1 milliGRAM(s) IntraMuscular once  insulin glargine Injectable (LANTUS) 34 Unit(s) SubCutaneous at bedtime  insulin lispro (ADMELOG) corrective regimen sliding scale   SubCutaneous three times a day before meals  insulin lispro (ADMELOG) corrective regimen sliding scale   SubCutaneous at bedtime  insulin lispro Injectable (ADMELOG) 3 Unit(s) SubCutaneous three times a day before meals  metoprolol tartrate 75 milliGRAM(s) Oral two times a day  polyethylene glycol 3350 17 Gram(s) Oral daily  sodium chloride 0.9%. 1000 milliLiter(s) (10 mL/Hr) IV Continuous <Continuous>      Physical Exam  General: Patient comfortable in bed  Vital Signs Last 12 Hrs  T(F): 98.4 (11-16-20 @ 11:55), Max: 98.4 (11-16-20 @ 11:55)  HR: 88 (11-16-20 @ 11:55) (70 - 88)  BP: 148/77 (11-16-20 @ 11:55) (131/79 - 172/90)  BP(mean): --  RR: 18 (11-16-20 @ 11:55) (18 - 18)  SpO2: 95% (11-16-20 @ 11:55) (95% - 98%)  Neck: No palpable thyroid nodules.  CVS: S1S2, No murmurs  Respiratory: No wheezing, no crepitations  GI: Abdomen soft, bowel sounds positive  Musculoskeletal:  edema lower extremities.   Skin: No skin rashes, no ecchymosis    Diagnostics    Free Thyroxine, Serum: AM Sched. Collection: 12-Nov-2020 06:00 (11-11 @ 12:29)           Chief complaint  Patient is a 80y old  Female who presents with a chief complaint of CAD (15 Nov 2020 11:47)   Review of systems  Patient in bed, looks comfortable, had hypoglycemic episodes.    Labs and Fingersticks  CAPILLARY BLOOD GLUCOSE      POCT Blood Glucose.: 105 mg/dL (16 Nov 2020 12:02)  POCT Blood Glucose.: 80 mg/dL (16 Nov 2020 11:48)  POCT Blood Glucose.: 63 mg/dL (16 Nov 2020 11:26)  POCT Blood Glucose.: 69 mg/dL (16 Nov 2020 11:25)  POCT Blood Glucose.: 73 mg/dL (16 Nov 2020 07:33)  POCT Blood Glucose.: 112 mg/dL (16 Nov 2020 02:01)  POCT Blood Glucose.: 111 mg/dL (15 Nov 2020 22:52)  POCT Blood Glucose.: 63 mg/dL (15 Nov 2020 22:04)  POCT Blood Glucose.: 63 mg/dL (15 Nov 2020 21:47)  POCT Blood Glucose.: 58 mg/dL (15 Nov 2020 21:45)  POCT Blood Glucose.: 102 mg/dL (15 Nov 2020 16:31)      Anion Gap, Serum: 10 (11-16 @ 05:44)  Anion Gap, Serum: 12 (11-15 @ 09:46)      Calcium, Total Serum: 8.8 (11-16 @ 05:44)  Calcium, Total Serum: 8.9 (11-15 @ 09:46)          11-16    139  |  102  |  23  ----------------------------<  94  4.1   |  27  |  1.28    Ca    8.8      16 Nov 2020 05:44  Phos  2.6     11-15  Mg     2.0     11-16                          10.0   11.94 )-----------( 253      ( 16 Nov 2020 05:44 )             32.5     Medications  MEDICATIONS  (STANDING):  amLODIPine   Tablet 5 milliGRAM(s) Oral daily  aspirin enteric coated 81 milliGRAM(s) Oral daily  atorvastatin 40 milliGRAM(s) Oral at bedtime  chlorhexidine 2% Cloths 1 Application(s) Topical <User Schedule>  dextrose 40% Gel 15 Gram(s) Oral once  dextrose 40% Gel 15 Gram(s) Oral once  dextrose 5%. 1000 milliLiter(s) (50 mL/Hr) IV Continuous <Continuous>  dextrose 5%. 1000 milliLiter(s) (100 mL/Hr) IV Continuous <Continuous>  dextrose 50% Injectable 25 Gram(s) IV Push once  dextrose 50% Injectable 12.5 Gram(s) IV Push once  dextrose 50% Injectable 25 Gram(s) IV Push once  famotidine Injectable 20 milliGRAM(s) IV Push every 12 hours  glucagon  Injectable 1 milliGRAM(s) IntraMuscular once  insulin glargine Injectable (LANTUS) 34 Unit(s) SubCutaneous at bedtime  insulin lispro (ADMELOG) corrective regimen sliding scale   SubCutaneous three times a day before meals  insulin lispro (ADMELOG) corrective regimen sliding scale   SubCutaneous at bedtime  insulin lispro Injectable (ADMELOG) 3 Unit(s) SubCutaneous three times a day before meals  metoprolol tartrate 75 milliGRAM(s) Oral two times a day  polyethylene glycol 3350 17 Gram(s) Oral daily  sodium chloride 0.9%. 1000 milliLiter(s) (10 mL/Hr) IV Continuous <Continuous>      Physical Exam  General: Patient comfortable in bed  Vital Signs Last 12 Hrs  T(F): 98.4 (11-16-20 @ 11:55), Max: 98.4 (11-16-20 @ 11:55)  HR: 88 (11-16-20 @ 11:55) (70 - 88)  BP: 148/77 (11-16-20 @ 11:55) (131/79 - 172/90)  BP(mean): --  RR: 18 (11-16-20 @ 11:55) (18 - 18)  SpO2: 95% (11-16-20 @ 11:55) (95% - 98%)  Neck: No palpable thyroid nodules.  CVS: S1S2, No murmurs  Respiratory: No wheezing, no crepitations  GI: Abdomen soft, bowel sounds positive  Musculoskeletal:  edema lower extremities.   Skin: No skin rashes, no ecchymosis    Diagnostics    Free Thyroxine, Serum: AM Sched. Collection: 12-Nov-2020 06:00 (11-11 @ 12:29)

## 2020-11-16 NOTE — PROGRESS NOTE ADULT - PROBLEM SELECTOR PLAN 2
On medications,  no chest pain, stable, monitored and followed up by primary cardiothoracic team/cardiology team. 99

## 2020-11-16 NOTE — PROGRESS NOTE ADULT - SUBJECTIVE AND OBJECTIVE BOX
NEPHROLOGY-[facility] [phone]        Patient seen and examined in bed.  She was in good spirit s        MEDICATIONS  (STANDING):  amLODIPine   Tablet 5 milliGRAM(s) Oral daily  aspirin enteric coated 81 milliGRAM(s) Oral daily  atorvastatin 40 milliGRAM(s) Oral at bedtime  chlorhexidine 2% Cloths 1 Application(s) Topical <User Schedule>  dextrose 40% Gel 15 Gram(s) Oral once  dextrose 40% Gel 15 Gram(s) Oral once  dextrose 5%. 1000 milliLiter(s) (50 mL/Hr) IV Continuous <Continuous>  dextrose 5%. 1000 milliLiter(s) (100 mL/Hr) IV Continuous <Continuous>  dextrose 50% Injectable 25 Gram(s) IV Push once  dextrose 50% Injectable 12.5 Gram(s) IV Push once  dextrose 50% Injectable 25 Gram(s) IV Push once  famotidine Injectable 20 milliGRAM(s) IV Push every 12 hours  glucagon  Injectable 1 milliGRAM(s) IntraMuscular once  insulin glargine Injectable (LANTUS) 34 Unit(s) SubCutaneous at bedtime  insulin lispro (ADMELOG) corrective regimen sliding scale   SubCutaneous three times a day before meals  insulin lispro (ADMELOG) corrective regimen sliding scale   SubCutaneous at bedtime  insulin lispro Injectable (ADMELOG) 6 Unit(s) SubCutaneous three times a day before meals  metoprolol tartrate 75 milliGRAM(s) Oral two times a day  polyethylene glycol 3350 17 Gram(s) Oral daily  sodium chloride 0.9%. 1000 milliLiter(s) (10 mL/Hr) IV Continuous <Continuous>      VITAL:  T(C): , Max: 36.6 (11-15-20 @ 20:14)  T(F): , Max: 97.9 (11-15-20 @ 20:14)  HR: 76 (20 @ 07:30)  BP: 156/81 (20 @ 07:30)  BP(mean): --  RR: 18 (20 @ 07:30)  SpO2: 98% (20 @ 07:30)  Wt(kg): --    I and O's:    11-15 @ 07:01  -   @ 07:00  --------------------------------------------------------  IN: 800 mL / OUT: 2150 mL / NET: -1350 mL     @ 07:01  -   @ 09:17  --------------------------------------------------------  IN: 200 mL / OUT: 0 mL / NET: 200 mL          PHYSICAL EXAM:    Constitutional: NAD  Neck:  No JVD  Respiratory: CTAB/L  Cardiovascular: S1 and S2  Gastrointestinal: BS+, soft, NT/ND  Extremities: No peripheral edema  Neurological: A/O x 3, no focal deficits  Psychiatric: Normal mood, normal affect  : No [place]  Skin: No rashes  Access: Not applicable    LABS:                        10.0   11. )-----------( 253      ( 2020 05:44 )             32.5     -    139  |  102  |  23  ----------------------------<  94  4.1   |  27  |  1.28    Ca    8.8      2020 05:44  Phos  2.6     -15  Mg     2.0                 Urine Studies:  Urinalysis Basic - ( 15 Nov 2020 18:25 )    Color: Light Yellow / Appearance: Clear / S.012 / pH: x  Gluc: x / Ketone: Negative  / Bili: Negative / Urobili: <2 mg/dL   Blood: x / Protein: Trace / Nitrite: Negative   Leuk Esterase: Negative / RBC: 1 /HPF / WBC 20 /HPF   Sq Epi: x / Non Sq Epi: 2 /HPF / Bacteria: Negative            RADIOLOGY & ADDITIONAL STUDIES:

## 2020-11-16 NOTE — PROGRESS NOTE ADULT - SUBJECTIVE AND OBJECTIVE BOX
Subjective: Patient seen and examined. No new events except as noted.     SUBJECTIVE/ROS:  feels ok       MEDICATIONS:  MEDICATIONS  (STANDING):  aspirin enteric coated 81 milliGRAM(s) Oral daily  atorvastatin 40 milliGRAM(s) Oral at bedtime  chlorhexidine 2% Cloths 1 Application(s) Topical <User Schedule>  dextrose 40% Gel 15 Gram(s) Oral once  dextrose 40% Gel 15 Gram(s) Oral once  dextrose 5%. 1000 milliLiter(s) (50 mL/Hr) IV Continuous <Continuous>  dextrose 5%. 1000 milliLiter(s) (100 mL/Hr) IV Continuous <Continuous>  dextrose 50% Injectable 25 Gram(s) IV Push once  dextrose 50% Injectable 12.5 Gram(s) IV Push once  dextrose 50% Injectable 25 Gram(s) IV Push once  famotidine Injectable 20 milliGRAM(s) IV Push every 12 hours  glucagon  Injectable 1 milliGRAM(s) IntraMuscular once  insulin glargine Injectable (LANTUS) 40 Unit(s) SubCutaneous at bedtime  insulin lispro (ADMELOG) corrective regimen sliding scale   SubCutaneous three times a day before meals  insulin lispro (ADMELOG) corrective regimen sliding scale   SubCutaneous at bedtime  insulin lispro Injectable (ADMELOG) 9 Unit(s) SubCutaneous three times a day before meals  metoprolol tartrate 75 milliGRAM(s) Oral two times a day  polyethylene glycol 3350 17 Gram(s) Oral daily  sodium chloride 0.9%. 1000 milliLiter(s) (10 mL/Hr) IV Continuous <Continuous>      PHYSICAL EXAM:  T(C): 36.6 (11-16-20 @ 05:09), Max: 36.6 (11-15-20 @ 20:14)  HR: 70 (11-16-20 @ 06:51) (70 - 83)  BP: 163/73 (11-16-20 @ 06:51) (115/75 - 172/90)  RR: 18 (11-16-20 @ 05:09) (18 - 18)  SpO2: 96% (11-16-20 @ 05:09) (96% - 99%)  Wt(kg): --  I&O's Summary    15 Nov 2020 07:01  -  16 Nov 2020 07:00  --------------------------------------------------------  IN: 600 mL / OUT: 2150 mL / NET: -1550 mL            JVP: Normal  Neck: supple  Lung: clear   CV: S1 S2 , Murmur:  Abd: soft  Ext: No edema  neuro: Awake / alert  Psych: flat affect  Skin: normal``    LABS/DATA:    CARDIAC MARKERS:                                10.0   11.94 )-----------( 253      ( 16 Nov 2020 05:44 )             32.5     11-16    139  |  102  |  23  ----------------------------<  94  4.1   |  27  |  1.28    Ca    8.8      16 Nov 2020 05:44  Phos  2.6     11-15  Mg     2.0     11-16      proBNP:   Lipid Profile:   HgA1c:   TSH:     TELE:  EKG:

## 2020-11-17 ENCOUNTER — TRANSCRIPTION ENCOUNTER (OUTPATIENT)
Age: 80
End: 2020-11-17

## 2020-11-17 VITALS
DIASTOLIC BLOOD PRESSURE: 78 MMHG | OXYGEN SATURATION: 97 % | SYSTOLIC BLOOD PRESSURE: 121 MMHG | HEART RATE: 81 BPM | TEMPERATURE: 100 F | RESPIRATION RATE: 18 BRPM

## 2020-11-17 LAB
ANION GAP SERPL CALC-SCNC: 9 MMOL/L — SIGNIFICANT CHANGE UP (ref 5–17)
BUN SERPL-MCNC: 24 MG/DL — HIGH (ref 7–23)
CALCIUM SERPL-MCNC: 9 MG/DL — SIGNIFICANT CHANGE UP (ref 8.4–10.5)
CHLORIDE SERPL-SCNC: 103 MMOL/L — SIGNIFICANT CHANGE UP (ref 96–108)
CO2 SERPL-SCNC: 25 MMOL/L — SIGNIFICANT CHANGE UP (ref 22–31)
CREAT SERPL-MCNC: 1.45 MG/DL — HIGH (ref 0.5–1.3)
GLUCOSE BLDC GLUCOMTR-MCNC: 161 MG/DL — HIGH (ref 70–99)
GLUCOSE BLDC GLUCOMTR-MCNC: 193 MG/DL — HIGH (ref 70–99)
GLUCOSE BLDC GLUCOMTR-MCNC: 43 MG/DL — CRITICAL LOW (ref 70–99)
GLUCOSE BLDC GLUCOMTR-MCNC: 492 MG/DL — CRITICAL HIGH (ref 70–99)
GLUCOSE SERPL-MCNC: 48 MG/DL — CRITICAL LOW (ref 70–99)
HCT VFR BLD CALC: 33.3 % — LOW (ref 34.5–45)
HGB BLD-MCNC: 10.3 G/DL — LOW (ref 11.5–15.5)
INR BLD: 2.13 RATIO — HIGH (ref 0.88–1.16)
MCHC RBC-ENTMCNC: 29 PG — SIGNIFICANT CHANGE UP (ref 27–34)
MCHC RBC-ENTMCNC: 30.9 GM/DL — LOW (ref 32–36)
MCV RBC AUTO: 93.8 FL — SIGNIFICANT CHANGE UP (ref 80–100)
NRBC # BLD: 0 /100 WBCS — SIGNIFICANT CHANGE UP (ref 0–0)
PHOSPHATE SERPL-MCNC: 3.1 MG/DL — SIGNIFICANT CHANGE UP (ref 2.5–4.5)
PLATELET # BLD AUTO: 306 K/UL — SIGNIFICANT CHANGE UP (ref 150–400)
POTASSIUM SERPL-MCNC: 4.1 MMOL/L — SIGNIFICANT CHANGE UP (ref 3.5–5.3)
POTASSIUM SERPL-SCNC: 4.1 MMOL/L — SIGNIFICANT CHANGE UP (ref 3.5–5.3)
PROTHROM AB SERPL-ACNC: 24.7 SEC — HIGH (ref 10.6–13.6)
RBC # BLD: 3.55 M/UL — LOW (ref 3.8–5.2)
RBC # FLD: 15.8 % — HIGH (ref 10.3–14.5)
SODIUM SERPL-SCNC: 137 MMOL/L — SIGNIFICANT CHANGE UP (ref 135–145)
WBC # BLD: 12.02 K/UL — HIGH (ref 3.8–10.5)
WBC # FLD AUTO: 12.02 K/UL — HIGH (ref 3.8–10.5)

## 2020-11-17 PROCEDURE — 82550 ASSAY OF CK (CPK): CPT

## 2020-11-17 PROCEDURE — U0003: CPT

## 2020-11-17 PROCEDURE — 83605 ASSAY OF LACTIC ACID: CPT

## 2020-11-17 PROCEDURE — 84436 ASSAY OF TOTAL THYROXINE: CPT

## 2020-11-17 PROCEDURE — 99285 EMERGENCY DEPT VISIT HI MDM: CPT | Mod: 25

## 2020-11-17 PROCEDURE — 85384 FIBRINOGEN ACTIVITY: CPT

## 2020-11-17 PROCEDURE — 73502 X-RAY EXAM HIP UNI 2-3 VIEWS: CPT

## 2020-11-17 PROCEDURE — 73721 MRI JNT OF LWR EXTRE W/O DYE: CPT

## 2020-11-17 PROCEDURE — 87641 MR-STAPH DNA AMP PROBE: CPT

## 2020-11-17 PROCEDURE — 85025 COMPLETE CBC W/AUTO DIFF WBC: CPT

## 2020-11-17 PROCEDURE — 83735 ASSAY OF MAGNESIUM: CPT

## 2020-11-17 PROCEDURE — P9045: CPT

## 2020-11-17 PROCEDURE — 85396 CLOTTING ASSAY WHOLE BLOOD: CPT

## 2020-11-17 PROCEDURE — 83880 ASSAY OF NATRIURETIC PEPTIDE: CPT

## 2020-11-17 PROCEDURE — 85610 PROTHROMBIN TIME: CPT

## 2020-11-17 PROCEDURE — 97162 PT EVAL MOD COMPLEX 30 MIN: CPT

## 2020-11-17 PROCEDURE — 85027 COMPLETE CBC AUTOMATED: CPT

## 2020-11-17 PROCEDURE — 84484 ASSAY OF TROPONIN QUANT: CPT

## 2020-11-17 PROCEDURE — 82306 VITAMIN D 25 HYDROXY: CPT

## 2020-11-17 PROCEDURE — 82553 CREATINE MB FRACTION: CPT

## 2020-11-17 PROCEDURE — 82803 BLOOD GASES ANY COMBINATION: CPT

## 2020-11-17 PROCEDURE — C1729: CPT

## 2020-11-17 PROCEDURE — 85014 HEMATOCRIT: CPT

## 2020-11-17 PROCEDURE — 71275 CT ANGIOGRAPHY CHEST: CPT

## 2020-11-17 PROCEDURE — 84439 ASSAY OF FREE THYROXINE: CPT

## 2020-11-17 PROCEDURE — 84480 ASSAY TRIIODOTHYRONINE (T3): CPT

## 2020-11-17 PROCEDURE — 94010 BREATHING CAPACITY TEST: CPT

## 2020-11-17 PROCEDURE — 82435 ASSAY OF BLOOD CHLORIDE: CPT

## 2020-11-17 PROCEDURE — 99153 MOD SED SAME PHYS/QHP EA: CPT

## 2020-11-17 PROCEDURE — 82947 ASSAY GLUCOSE BLOOD QUANT: CPT

## 2020-11-17 PROCEDURE — 93306 TTE W/DOPPLER COMPLETE: CPT

## 2020-11-17 PROCEDURE — C1889: CPT

## 2020-11-17 PROCEDURE — 81001 URINALYSIS AUTO W/SCOPE: CPT

## 2020-11-17 PROCEDURE — 99152 MOD SED SAME PHYS/QHP 5/>YRS: CPT

## 2020-11-17 PROCEDURE — 71045 X-RAY EXAM CHEST 1 VIEW: CPT

## 2020-11-17 PROCEDURE — 76377 3D RENDER W/INTRP POSTPROCES: CPT

## 2020-11-17 PROCEDURE — C1769: CPT

## 2020-11-17 PROCEDURE — 97530 THERAPEUTIC ACTIVITIES: CPT

## 2020-11-17 PROCEDURE — 73552 X-RAY EXAM OF FEMUR 2/>: CPT

## 2020-11-17 PROCEDURE — P9047: CPT

## 2020-11-17 PROCEDURE — 85730 THROMBOPLASTIN TIME PARTIAL: CPT

## 2020-11-17 PROCEDURE — 93971 EXTREMITY STUDY: CPT

## 2020-11-17 PROCEDURE — 84295 ASSAY OF SERUM SODIUM: CPT

## 2020-11-17 PROCEDURE — 83036 HEMOGLOBIN GLYCOSYLATED A1C: CPT

## 2020-11-17 PROCEDURE — 80061 LIPID PANEL: CPT

## 2020-11-17 PROCEDURE — 94002 VENT MGMT INPAT INIT DAY: CPT

## 2020-11-17 PROCEDURE — 86769 SARS-COV-2 COVID-19 ANTIBODY: CPT

## 2020-11-17 PROCEDURE — 93005 ELECTROCARDIOGRAM TRACING: CPT

## 2020-11-17 PROCEDURE — 86850 RBC ANTIBODY SCREEN: CPT

## 2020-11-17 PROCEDURE — 84132 ASSAY OF SERUM POTASSIUM: CPT

## 2020-11-17 PROCEDURE — 85018 HEMOGLOBIN: CPT

## 2020-11-17 PROCEDURE — 86901 BLOOD TYPING SEROLOGIC RH(D): CPT

## 2020-11-17 PROCEDURE — C1887: CPT

## 2020-11-17 PROCEDURE — 86900 BLOOD TYPING SEROLOGIC ABO: CPT

## 2020-11-17 PROCEDURE — 72192 CT PELVIS W/O DYE: CPT

## 2020-11-17 PROCEDURE — 87086 URINE CULTURE/COLONY COUNT: CPT

## 2020-11-17 PROCEDURE — 80048 BASIC METABOLIC PNL TOTAL CA: CPT

## 2020-11-17 PROCEDURE — 73110 X-RAY EXAM OF WRIST: CPT

## 2020-11-17 PROCEDURE — C1751: CPT

## 2020-11-17 PROCEDURE — 86891 AUTOLOGOUS BLOOD OP SALVAGE: CPT

## 2020-11-17 PROCEDURE — C1894: CPT

## 2020-11-17 PROCEDURE — 96374 THER/PROPH/DIAG INJ IV PUSH: CPT | Mod: XU

## 2020-11-17 PROCEDURE — 82962 GLUCOSE BLOOD TEST: CPT

## 2020-11-17 PROCEDURE — 86923 COMPATIBILITY TEST ELECTRIC: CPT

## 2020-11-17 PROCEDURE — 93454 CORONARY ARTERY ANGIO S&I: CPT

## 2020-11-17 PROCEDURE — 84100 ASSAY OF PHOSPHORUS: CPT

## 2020-11-17 PROCEDURE — 96375 TX/PRO/DX INJ NEW DRUG ADDON: CPT | Mod: XU

## 2020-11-17 PROCEDURE — 84443 ASSAY THYROID STIM HORMONE: CPT

## 2020-11-17 PROCEDURE — 82330 ASSAY OF CALCIUM: CPT

## 2020-11-17 PROCEDURE — 97116 GAIT TRAINING THERAPY: CPT

## 2020-11-17 PROCEDURE — 87640 STAPH A DNA AMP PROBE: CPT

## 2020-11-17 PROCEDURE — 82040 ASSAY OF SERUM ALBUMIN: CPT

## 2020-11-17 PROCEDURE — 80076 HEPATIC FUNCTION PANEL: CPT

## 2020-11-17 PROCEDURE — 80053 COMPREHEN METABOLIC PANEL: CPT

## 2020-11-17 RX ORDER — ACETAMINOPHEN 500 MG
2 TABLET ORAL
Qty: 0 | Refills: 0 | DISCHARGE
Start: 2020-11-17

## 2020-11-17 RX ORDER — SPIRONOLACTONE 25 MG/1
0.5 TABLET, FILM COATED ORAL
Qty: 0 | Refills: 0 | DISCHARGE

## 2020-11-17 RX ORDER — FAMOTIDINE 10 MG/ML
2 INJECTION INTRAVENOUS
Qty: 0 | Refills: 0 | DISCHARGE
Start: 2020-11-17

## 2020-11-17 RX ORDER — APIXABAN 2.5 MG/1
1 TABLET, FILM COATED ORAL
Qty: 0 | Refills: 0 | DISCHARGE

## 2020-11-17 RX ORDER — ATORVASTATIN CALCIUM 80 MG/1
1 TABLET, FILM COATED ORAL
Qty: 0 | Refills: 0 | DISCHARGE
Start: 2020-11-17

## 2020-11-17 RX ORDER — WARFARIN SODIUM 2.5 MG/1
1 TABLET ORAL
Qty: 0 | Refills: 0 | DISCHARGE
Start: 2020-11-17

## 2020-11-17 RX ORDER — DEXTROSE 50 % IN WATER 50 %
15 SYRINGE (ML) INTRAVENOUS ONCE
Refills: 0 | Status: COMPLETED | OUTPATIENT
Start: 2020-11-17 | End: 2020-11-17

## 2020-11-17 RX ORDER — OXYCODONE HYDROCHLORIDE 5 MG/1
1 TABLET ORAL
Qty: 0 | Refills: 0 | DISCHARGE
Start: 2020-11-17

## 2020-11-17 RX ORDER — ASPIRIN/CALCIUM CARB/MAGNESIUM 324 MG
1 TABLET ORAL
Qty: 0 | Refills: 0 | DISCHARGE
Start: 2020-11-17

## 2020-11-17 RX ORDER — BUMETANIDE 0.25 MG/ML
1 INJECTION INTRAMUSCULAR; INTRAVENOUS
Qty: 0 | Refills: 0 | DISCHARGE

## 2020-11-17 RX ORDER — HYDRALAZINE HCL 50 MG
1 TABLET ORAL
Qty: 0 | Refills: 0 | DISCHARGE

## 2020-11-17 RX ORDER — ACETAMINOPHEN 500 MG
1 TABLET ORAL
Qty: 0 | Refills: 0 | DISCHARGE

## 2020-11-17 RX ORDER — ATORVASTATIN CALCIUM 80 MG/1
1 TABLET, FILM COATED ORAL
Qty: 0 | Refills: 0 | DISCHARGE

## 2020-11-17 RX ORDER — GLIMEPIRIDE 1 MG
1 TABLET ORAL
Qty: 0 | Refills: 0 | DISCHARGE

## 2020-11-17 RX ORDER — AMLODIPINE BESYLATE 2.5 MG/1
1 TABLET ORAL
Qty: 0 | Refills: 0 | DISCHARGE
Start: 2020-11-17

## 2020-11-17 RX ORDER — POLYETHYLENE GLYCOL 3350 17 G/17G
17 POWDER, FOR SOLUTION ORAL
Qty: 0 | Refills: 0 | DISCHARGE
Start: 2020-11-17

## 2020-11-17 RX ORDER — OXYMETAZOLINE HYDROCHLORIDE 0.5 MG/ML
1 SPRAY NASAL
Qty: 0 | Refills: 0 | DISCHARGE

## 2020-11-17 RX ORDER — OXYCODONE HYDROCHLORIDE 5 MG/1
2 TABLET ORAL
Qty: 0 | Refills: 0 | DISCHARGE
Start: 2020-11-17

## 2020-11-17 RX ORDER — FAMOTIDINE 10 MG/ML
1 INJECTION INTRAVENOUS
Qty: 0 | Refills: 0 | DISCHARGE
Start: 2020-11-17

## 2020-11-17 RX ORDER — METOPROLOL TARTRATE 50 MG
150 TABLET ORAL DAILY
Refills: 0 | Status: DISCONTINUED | OUTPATIENT
Start: 2020-11-17 | End: 2020-11-17

## 2020-11-17 RX ORDER — INSULIN GLARGINE 100 [IU]/ML
10 INJECTION, SOLUTION SUBCUTANEOUS AT BEDTIME
Refills: 0 | Status: DISCONTINUED | OUTPATIENT
Start: 2020-11-17 | End: 2020-11-17

## 2020-11-17 RX ORDER — GABAPENTIN 400 MG/1
1 CAPSULE ORAL
Qty: 0 | Refills: 0 | DISCHARGE

## 2020-11-17 RX ADMIN — OXYCODONE HYDROCHLORIDE 5 MILLIGRAM(S): 5 TABLET ORAL at 11:04

## 2020-11-17 RX ADMIN — Medication 150 MILLIGRAM(S): at 11:04

## 2020-11-17 RX ADMIN — Medication 3 UNIT(S): at 12:04

## 2020-11-17 RX ADMIN — Medication 75 MILLIGRAM(S): at 05:10

## 2020-11-17 RX ADMIN — Medication 15 GRAM(S): at 06:56

## 2020-11-17 RX ADMIN — OXYCODONE HYDROCHLORIDE 5 MILLIGRAM(S): 5 TABLET ORAL at 03:30

## 2020-11-17 RX ADMIN — FAMOTIDINE 20 MILLIGRAM(S): 10 INJECTION INTRAVENOUS at 05:10

## 2020-11-17 RX ADMIN — Medication 1: at 12:04

## 2020-11-17 RX ADMIN — Medication 81 MILLIGRAM(S): at 12:05

## 2020-11-17 RX ADMIN — AMLODIPINE BESYLATE 5 MILLIGRAM(S): 2.5 TABLET ORAL at 05:10

## 2020-11-17 RX ADMIN — OXYCODONE HYDROCHLORIDE 5 MILLIGRAM(S): 5 TABLET ORAL at 02:28

## 2020-11-17 NOTE — PROVIDER CONTACT NOTE (OTHER) - SITUATION
Bedtime FS 93mg/dL, pt going to receive 34units of Lantus. RN asked provider if it is okay to give Lantus of 34u.

## 2020-11-17 NOTE — PROVIDER CONTACT NOTE (CRITICAL VALUE NOTIFICATION) - ACTION/TREATMENT ORDERED:
NP made aware. RN gave pt juice and dextrose gel. FS checked was 43 mg/dL and later repeated was 492 mg/dL. NP made aware. RN gave pt juice and dextrose gel. FS checked was 43 mg/dL and later repeated was 492 mg/dL. Endorse hypoglycemic event to day RN. day RN made aware and will continue to monitor.

## 2020-11-17 NOTE — DISCHARGE NOTE PROVIDER - HOSPITAL COURSE
80F, Syriac speaking, with a PMH of renal insufficiency, HTN, DVT (on Eliquis 5mg BID, unsure of last dose prior to admission), CHF, DMII, HLD, obesity, R breast cancer s/p mastectomy, colon cancer s/p resection, rectal cancer s/p chemo and radiation, lymphedema of arm, presenting with R hip pain s/p mechanical fall 3 weeks prior to admission. Discussed case with daughter Shauna as well. Pt has been walking since her fall 3 weeks ago with consistent pain. Pt is able to bear weight and walks with assistive devices. Pt presented to ED with shortness of breath as well. Admitted to orthopedic surgery, planned for total hip replacement if medically cleared. Cardiology stated that based on current ACC/AHA guidelines, patient history and physical exam, the patient is considered to have elevated risk given her abn stress test, sob and abn EKG, and recommended that pt needs cath prior to surgery. Cath showed 60% stenosis of pLAD, 90% stenosis of Cx and 90% stenosis of RCA. TTE showed normal LV function. CT surgery was consulted for CABG evaluation w/ Dr. Heath.  11/5: Pt seen and evaluated at bedside - NICO, NAD. Pt is amenable to CT surgery at this time. CT surgery pre-op workup in progress. D/w Dr. Heath - pt scheduled for OR Monday 11/9. Recommend initiating ASA 81 mg QD pre-op and continuation of Statin and beta blocker.  11/7: preop workup in progress; continue asa/ statin/b-blockers, mrsa nares- on bactroban, ck covid in am, OHS 11/9 with Dr. Heath   11/8: VSS, COVID PCR sent, lovenox dc'd. OR Mon 2nd case.   11/9: S/p CABG (LIMA to LAD, SVG to RCA)  11/10: Extubated successfully, transferred to step down. Continue Lovenox, ASA/statin, lopressor. Continue insulin gtt. PT recommending subacute rehab  11/11: Will administer warfarin 5mg. Cr uptrending to 1.47. Will remove both CT today. Will wean off insulin gtt today.  Endo consult.  D/c chest tubes intro chen.  Hold on floor transfer 2/2 insulin infusionpt is afebrile. will ck u/a BB increased to 50,m  11/12 VSS - pt refused blood draw this am - instructed on importance of blood draws explained to pt.  rounds made w/ Dr. Heath   wbc increasing will ck u/a- lopressor increased to 50mg po bid - d/c plan rehab  11/13 PW removed. INR 1.62 Coumadin 5mg tonight Pending insurance authorization for rehab.  Creat 1.45  11/14 Creatinine improved,  Coumadin 5 mg for INR 1.84 Lopressor increased 50mg bid.   11/15 Creat wnl, Hold coumadin for INR 2.67. BBlocker increased. Ck UA, and cxr. Monitor wbc. afebrile  11/16 VSS; INR 2.9 today - hold coumadin today; lantus and humalog adjusted as per endo for hypoglycemia  Discharge planning- rehab tue if bs and INR stable   11/17 VSS - pt cleared for rehab today as per Dr. Heath - multidisciplinary rounds made this am

## 2020-11-17 NOTE — DISCHARGE NOTE PROVIDER - PROVIDER TOKENS
PROVIDER:[TOKEN:[[id number]]],PROVIDER:[TOKEN:[[id number]]],PROVIDER:[TOKEN:[[id number]]]

## 2020-11-17 NOTE — PROGRESS NOTE ADULT - SUBJECTIVE AND OBJECTIVE BOX
Subjective: Patient seen and examined. No new events except as noted.     SUBJECTIVE/ROS:  no new events        MEDICATIONS:  MEDICATIONS  (STANDING):  amLODIPine   Tablet 5 milliGRAM(s) Oral daily  aspirin enteric coated 81 milliGRAM(s) Oral daily  atorvastatin 40 milliGRAM(s) Oral at bedtime  chlorhexidine 2% Cloths 1 Application(s) Topical <User Schedule>  dextrose 40% Gel 15 Gram(s) Oral once  dextrose 40% Gel 15 Gram(s) Oral once  dextrose 5%. 1000 milliLiter(s) (50 mL/Hr) IV Continuous <Continuous>  dextrose 5%. 1000 milliLiter(s) (100 mL/Hr) IV Continuous <Continuous>  dextrose 50% Injectable 25 Gram(s) IV Push once  dextrose 50% Injectable 12.5 Gram(s) IV Push once  dextrose 50% Injectable 25 Gram(s) IV Push once  famotidine Injectable 20 milliGRAM(s) IV Push every 12 hours  glucagon  Injectable 1 milliGRAM(s) IntraMuscular once  insulin glargine Injectable (LANTUS) 34 Unit(s) SubCutaneous at bedtime  insulin lispro (ADMELOG) corrective regimen sliding scale   SubCutaneous three times a day before meals  insulin lispro (ADMELOG) corrective regimen sliding scale   SubCutaneous at bedtime  insulin lispro Injectable (ADMELOG) 3 Unit(s) SubCutaneous three times a day before meals  metoprolol tartrate 75 milliGRAM(s) Oral two times a day  polyethylene glycol 3350 17 Gram(s) Oral daily  sodium chloride 0.9%. 1000 milliLiter(s) (10 mL/Hr) IV Continuous <Continuous>      PHYSICAL EXAM:  T(C): 37.1 (11-17-20 @ 05:06), Max: 37.1 (11-17-20 @ 05:06)  HR: 73 (11-17-20 @ 05:06) (73 - 88)  BP: 124/79 (11-17-20 @ 05:06) (124/79 - 154/77)  RR: 18 (11-17-20 @ 05:06) (18 - 18)  SpO2: 97% (11-17-20 @ 05:06) (95% - 98%)  Wt(kg): --  I&O's Summary    16 Nov 2020 07:01  -  17 Nov 2020 07:00  --------------------------------------------------------  IN: 900 mL / OUT: 1550 mL / NET: -650 mL    17 Nov 2020 07:01  -  17 Nov 2020 09:22  --------------------------------------------------------  IN: 240 mL / OUT: 0 mL / NET: 240 mL            JVP: Normal  Neck: supple  Lung: clear   CV: S1 S2 , Murmur:  Abd: soft  Ext: No edema  neuro: Awake / alert  Psych: flat affect  Skin: normal``    LABS/DATA:    CARDIAC MARKERS:                                10.3   12.02 )-----------( 306      ( 17 Nov 2020 05:51 )             33.3     11-17    137  |  103  |  24<H>  ----------------------------<  48<LL>  4.1   |  25  |  1.45<H>    Ca    9.0      17 Nov 2020 05:51  Phos  3.1     11-17  Mg     2.0     11-16      proBNP:   Lipid Profile:   HgA1c:   TSH:     TELE:  EKG:

## 2020-11-17 NOTE — DISCHARGE NOTE PROVIDER - NSDCMRMEDTOKEN_GEN_ALL_CORE_FT
acetaminophen 325 mg oral tablet: 2 tab(s) orally every 6 hours, As needed, Temp greater or equal to 38.5C (101.3F), Mild Pain (1 - 3)  amLODIPine 5 mg oral tablet: 1 tab(s) orally once a day  aspirin 81 mg oral delayed release tablet: 1 tab(s) orally once a day  atorvastatin 40 mg oral tablet: 1 tab(s) orally once a day (at bedtime)  famotidine 10 mg/mL intravenous solution: 2 milliliter(s) intravenous every 12 hours  glimepiride 2 mg oral tablet: 1 tab(s) orally once a day  Januvia 25 mg oral tablet: 1 tab(s) orally once a day  oxyCODONE 5 mg oral tablet: 1 tab(s) orally every 6 hours, As needed, Moderate Pain (4 - 6)  polyethylene glycol 3350 oral powder for reconstitution: 17 gram(s) orally once a day  Toprol- mg oral tablet, extended release: 1.5 tab(s) orally once a day   acetaminophen 325 mg oral tablet: 2 tab(s) orally every 6 hours, As needed, Temp greater or equal to 38.5C (101.3F), Mild Pain (1 - 3)  amLODIPine 5 mg oral tablet: 1 tab(s) orally once a day  aspirin 81 mg oral delayed release tablet: 1 tab(s) orally once a day  atorvastatin 40 mg oral tablet: 1 tab(s) orally once a day (at bedtime)  Coumadin 4 mg oral tablet: 1 tab(s) orally once a day (at bedtime)  famotidine 10 mg/mL intravenous solution: 2 milliliter(s) intravenous every 12 hours  glimepiride 2 mg oral tablet: 1 tab(s) orally once a day  Januvia 25 mg oral tablet: 1 tab(s) orally once a day  oxyCODONE 5 mg oral tablet: 1 tab(s) orally every 6 hours, As needed, Moderate Pain (4 - 6)  polyethylene glycol 3350 oral powder for reconstitution: 17 gram(s) orally once a day  Toprol- mg oral tablet, extended release: 1.5 tab(s) orally once a day

## 2020-11-17 NOTE — DISCHARGE NOTE PROVIDER - CARE PROVIDER_API CALL
William Heath  SURGERY  [address] [place] [address]  Phone: [phone]  Fax: [phone]  Follow Up Time:     Sergey Maxwell  CARDIOVASCULAR DISEASE  [address] [address]  Phone: [phone]  Fax: [phone]  Follow Up Time:     Dior Monroy  Endocrinology, Diabetes and Metabolism  [address] [place] [address]  Phone: [phone]  Fax: [phone]  Follow Up Time:

## 2020-11-17 NOTE — DISCHARGE NOTE PROVIDER - NSDCFUSCHEDAPPT_GEN_ALL_CORE_FT
NURIA GARCIA ; 11/23/2020 ; NPP Med [facility]Int [address] ; 11/23/2020 ; NPP Cardio [address] ; 11/23/2020 ; NP Urology [address]

## 2020-11-17 NOTE — PROGRESS NOTE ADULT - ASSESSMENT
80F w/ HTN, DM2, CKD3a, and HFpEF, 11/2/20 a/w right hip fracture s/p mechanical fall    (1)Renal - stage 3a with non-nephrotic range proteinuria; due to DM/HTN. No evidence to date of TAO, s/p CABG      Azotemia resolving/ resolved    (2)Lytes- controlled    (3)Anemia-hgb stable       (5)Ortho - right hip fracture -        RECOMMEND:  (1)No renal objection to lasix at present but she is autodiuresing and there is not much edema in the LE   (2)Dose new meds for GFR 40-45ml/min (present dosing acceptable)  (3)Ambulate at present     [facility]  [phone]       80F w/ HTN, DM2, CKD3a, and HFpEF, 11/2/20 a/w right hip fracture s/p mechanical fall    (1)Renal - stage 3a with non-nephrotic range proteinuria; due to DM/HTN. No evidence to date of TAO, s/p CABG      Azotemia resolving/ resolved    (2)Lytes- controlled    (3)Anemia-hgb stable       (5)Ortho - right hip fracture -        RECOMMEND:  (1)No renal objection to lasix at present but she is autodiuresing    (2)Dose new meds for GFR 40-45ml/min (present dosing acceptable)  (3)Ambulate at present     [facility]  [phone]

## 2020-11-17 NOTE — PROVIDER CONTACT NOTE (OTHER) - ACTION/TREATMENT ORDERED:
Give AM BP meds (metoprolol and hydralazine). Recheck BP
Give 25mg lopressor PO stat. Will increase lopressor to 50mg BID. Continue to monitor.
Lantus ordered to be decreased from 42 units to 34 units. Lantus 34 units administered. Will continue to monitor pt.
PA made aware and ordered to give some juice with meds and give Lantus.
STAT CXR to r/o air leak. Continue to monitor.

## 2020-11-17 NOTE — PROGRESS NOTE ADULT - SUBJECTIVE AND OBJECTIVE BOX
Chief complaint  Patient is a 80y old  Female who presents with a chief complaint of 11/9/2020  CABG x 2 w/ LIMA  (17 Nov 2020 10:21)   Review of systems    Patient had hypoglycemic episode this AM.  Seen and examined at bedside, awake in chair, appears comfortable, planning DC.    Labs and Fingersticks  CAPILLARY BLOOD GLUCOSE      POCT Blood Glucose.: 193 mg/dL (17 Nov 2020 11:57)  POCT Blood Glucose.: 161 mg/dL (17 Nov 2020 07:30)  POCT Blood Glucose.: 492 mg/dL (17 Nov 2020 07:14)  POCT Blood Glucose.: 43 mg/dL (17 Nov 2020 06:50)  POCT Blood Glucose.: 93 mg/dL (16 Nov 2020 21:43)  POCT Blood Glucose.: 152 mg/dL (16 Nov 2020 16:37)    Medications  MEDICATIONS  (STANDING):  amLODIPine   Tablet 5 milliGRAM(s) Oral daily  aspirin enteric coated 81 milliGRAM(s) Oral daily  atorvastatin 40 milliGRAM(s) Oral at bedtime  chlorhexidine 2% Cloths 1 Application(s) Topical <User Schedule>  dextrose 40% Gel 15 Gram(s) Oral once  dextrose 40% Gel 15 Gram(s) Oral once  dextrose 5%. 1000 milliLiter(s) (50 mL/Hr) IV Continuous <Continuous>  dextrose 5%. 1000 milliLiter(s) (100 mL/Hr) IV Continuous <Continuous>  dextrose 50% Injectable 25 Gram(s) IV Push once  dextrose 50% Injectable 12.5 Gram(s) IV Push once  dextrose 50% Injectable 25 Gram(s) IV Push once  famotidine Injectable 20 milliGRAM(s) IV Push every 12 hours  glucagon  Injectable 1 milliGRAM(s) IntraMuscular once  insulin glargine Injectable (LANTUS) 10 Unit(s) SubCutaneous at bedtime  insulin lispro (ADMELOG) corrective regimen sliding scale   SubCutaneous three times a day before meals  insulin lispro (ADMELOG) corrective regimen sliding scale   SubCutaneous at bedtime  insulin lispro Injectable (ADMELOG) 3 Unit(s) SubCutaneous three times a day before meals  metoprolol succinate  milliGRAM(s) Oral daily  polyethylene glycol 3350 17 Gram(s) Oral daily  sodium chloride 0.9%. 1000 milliLiter(s) (10 mL/Hr) IV Continuous <Continuous>      Physical Exam  General: Patient comfortable in bed  Vital Signs Last 12 Hrs  T(F): 98.8 (11-17-20 @ 05:06), Max: 98.8 (11-17-20 @ 05:06)  HR: 73 (11-17-20 @ 05:06) (73 - 73)  BP: 124/79 (11-17-20 @ 05:06) (124/79 - 124/79)  BP(mean): 94 (11-17-20 @ 05:06) (94 - 94)  RR: 18 (11-17-20 @ 05:06) (18 - 18)  SpO2: 97% (11-17-20 @ 05:06) (97% - 97%)  Neck: No palpable thyroid nodules.     Chief complaint  Patient is a 80y old  Female who presents with a chief complaint of 11/9/2020  CABG x 2 w/ LIMA  (17 Nov 2020 10:21)   Review of systems    Patient had hypoglycemic episode this AM.  Seen and examined at bedside, awake in chair, appears comfortable, planning DC.    Labs and Fingersticks  CAPILLARY BLOOD GLUCOSE      POCT Blood Glucose.: 193 mg/dL (17 Nov 2020 11:57)  POCT Blood Glucose.: 161 mg/dL (17 Nov 2020 07:30)  POCT Blood Glucose.: 492 mg/dL (17 Nov 2020 07:14)  POCT Blood Glucose.: 43 mg/dL (17 Nov 2020 06:50)  POCT Blood Glucose.: 93 mg/dL (16 Nov 2020 21:43)  POCT Blood Glucose.: 152 mg/dL (16 Nov 2020 16:37)    Medications  MEDICATIONS  (STANDING):  amLODIPine   Tablet 5 milliGRAM(s) Oral daily  aspirin enteric coated 81 milliGRAM(s) Oral daily  atorvastatin 40 milliGRAM(s) Oral at bedtime  chlorhexidine 2% Cloths 1 Application(s) Topical <User Schedule>  dextrose 40% Gel 15 Gram(s) Oral once  dextrose 40% Gel 15 Gram(s) Oral once  dextrose 5%. 1000 milliLiter(s) (50 mL/Hr) IV Continuous <Continuous>  dextrose 5%. 1000 milliLiter(s) (100 mL/Hr) IV Continuous <Continuous>  dextrose 50% Injectable 25 Gram(s) IV Push once  dextrose 50% Injectable 12.5 Gram(s) IV Push once  dextrose 50% Injectable 25 Gram(s) IV Push once  famotidine Injectable 20 milliGRAM(s) IV Push every 12 hours  glucagon  Injectable 1 milliGRAM(s) IntraMuscular once  insulin glargine Injectable (LANTUS) 10 Unit(s) SubCutaneous at bedtime  insulin lispro (ADMELOG) corrective regimen sliding scale   SubCutaneous three times a day before meals  insulin lispro (ADMELOG) corrective regimen sliding scale   SubCutaneous at bedtime  insulin lispro Injectable (ADMELOG) 3 Unit(s) SubCutaneous three times a day before meals  metoprolol succinate  milliGRAM(s) Oral daily  polyethylene glycol 3350 17 Gram(s) Oral daily  sodium chloride 0.9%. 1000 milliLiter(s) (10 mL/Hr) IV Continuous <Continuous>      Physical Exam  General: Patient comfortable in bed  Vital Signs Last 12 Hrs  T(F): 98.8 (11-17-20 @ 05:06), Max: 98.8 (11-17-20 @ 05:06)  HR: 73 (11-17-20 @ 05:06) (73 - 73)  BP: 124/79 (11-17-20 @ 05:06) (124/79 - 124/79)  BP(mean): 94 (11-17-20 @ 05:06) (94 - 94)  RR: 18 (11-17-20 @ 05:06) (18 - 18)  SpO2: 97% (11-17-20 @ 05:06) (97% - 97%)  Neck: No palpable thyroid nodules.

## 2020-11-17 NOTE — PROGRESS NOTE ADULT - ASSESSMENT
CAD  s/p bypass  stable   asa   statin   fu with CTS     HTN  add amlodipine 5     CKD  monitor lytes   stable crt     Hypoglycemia  fu with Endo    DVT  on a/c with coumadin as per primary team

## 2020-11-17 NOTE — DISCHARGE NOTE PROVIDER - NSDCFUADDAPPT_GEN_ALL_CORE_FT
follow up appt with Dr. William Heath, cardiac surgeon, the week you arrive home from rehab   follow up appt with DR. Maxwell Cardiologist when you arrive home from rehab

## 2020-11-17 NOTE — DISCHARGE NOTE PROVIDER - NSDCCPCAREPLAN_GEN_ALL_CORE_FT
PRINCIPAL DISCHARGE DIAGNOSIS  Diagnosis: S/P CABG x 2  Assessment and Plan of Treatment: physical therapy in rehab  cbc, sma7 and pt/inr on Mondays & thursdays  pt is on coumadin for DVTs- maintain INR 2-3  shower patient  weigh patient daily - report weight gain > 2.5 pounds overnight to MD  diabetic diet, no added salt  check glucose before breakfast and before dinner-maintain glucose < 150 - add sliding scale if necessary  elevate legs

## 2020-11-17 NOTE — PROGRESS NOTE ADULT - ASSESSMENT
Assessment  DMT2: 80y Female with DM T2 with hyperglycemia, A1C 7.5%, was on oral meds at home, now on basal bolus insulin, decreased dose yesterday, patient had hypoglycemic episode this AM, insulin requirement is trending down, she appears comfortable, planning DC to [facility] this afternoon.  CAD: s/p CABG, on medications, stable, monitored.  Hyperthyroidism: Patient has no history thyroid disease, was not on any thyroid supplements, subclinical, asymptomatic.      Dior Monroy MD  Cell: [phone]  Office: [phone]         Assessment  DMT2: 80y Female with DM T2 with hyperglycemia, A1C 7.5%, was on oral meds at home, now on basal bolus insulin, decreased dose yesterday, patient had  hypoglycemic episode this AM, insulin requirement is trending down, she appears comfortable, planning DC to [facility] this afternoon.  CAD: s/p CABG, on medications, stable, monitored.  Hyperthyroidism: Patient has no history thyroid disease, was not on any thyroid supplements, subclinical, asymptomatic.      Dior Monroy MD  Cell: [phone]  Office: [phone]

## 2020-11-17 NOTE — DISCHARGE NOTE PROVIDER - NSDCPNSUBOBJ_GEN_ALL_CORE
PHYSICAL EXAM     Neurology: alert and oriented x 3, nonfocal, no gross deficits  CV : tele:  RSR 60-80  Sternal Wound :  CDI ANA PAULA - sternum stable   Lungs: clear diminished at the bases. RR easy, unlabored   Abdomen: soft, nontender, nondistended, positive bowel sounds, + bowel movement   Neg N/V/D; obese abdomen    :  pt voiding without difficulty   Extremities:   CUEVAS; trace LE edema, neg calf tenderness.   PPP bilaterally  R LE inc cdi w/ ecchymosis -

## 2020-11-17 NOTE — DISCHARGE NOTE NURSING/CASE MANAGEMENT/SOCIAL WORK - PATIENT PORTAL LINK FT
You can access the FollowMyHealth Patient Portal offered by [facility] by registering at the following website: [email or web address]. By joining [employer]’s FollowMyHealth portal, you will also be able to view your health information using other applications (apps) compatible with our system.

## 2020-11-17 NOTE — PROGRESS NOTE ADULT - SUBJECTIVE AND OBJECTIVE BOX
NEPHROLOGY-[facility] [phone]        Patient seen and examined in bed. She was about the same         MEDICATIONS  (STANDING):  amLODIPine   Tablet 5 milliGRAM(s) Oral daily  aspirin enteric coated 81 milliGRAM(s) Oral daily  atorvastatin 40 milliGRAM(s) Oral at bedtime  chlorhexidine 2% Cloths 1 Application(s) Topical <User Schedule>  dextrose 40% Gel 15 Gram(s) Oral once  dextrose 40% Gel 15 Gram(s) Oral once  dextrose 5%. 1000 milliLiter(s) (50 mL/Hr) IV Continuous <Continuous>  dextrose 5%. 1000 milliLiter(s) (100 mL/Hr) IV Continuous <Continuous>  dextrose 50% Injectable 25 Gram(s) IV Push once  dextrose 50% Injectable 12.5 Gram(s) IV Push once  dextrose 50% Injectable 25 Gram(s) IV Push once  famotidine Injectable 20 milliGRAM(s) IV Push every 12 hours  glucagon  Injectable 1 milliGRAM(s) IntraMuscular once  insulin glargine Injectable (LANTUS) 34 Unit(s) SubCutaneous at bedtime  insulin lispro (ADMELOG) corrective regimen sliding scale   SubCutaneous three times a day before meals  insulin lispro (ADMELOG) corrective regimen sliding scale   SubCutaneous at bedtime  insulin lispro Injectable (ADMELOG) 3 Unit(s) SubCutaneous three times a day before meals  metoprolol tartrate 75 milliGRAM(s) Oral two times a day  polyethylene glycol 3350 17 Gram(s) Oral daily  sodium chloride 0.9%. 1000 milliLiter(s) (10 mL/Hr) IV Continuous <Continuous>      VITAL:  T(C): , Max: 37.1 (20 @ 05:06)  T(F): , Max: 98.8 (20 @ 05:06)  HR: 73 (20 @ 05:06)  BP: 124/79 (20 @ 05:06)  BP(mean): 94 (20 @ 05:06)  RR: 18 (20 @ 05:06)  SpO2: 97% (20 @ 05:06)  Wt(kg): --    I and O's:     @ 07:01  -   @ 07:00  --------------------------------------------------------  IN: 900 mL / OUT: 1550 mL / NET: -650 mL     @ 07:01  -   @ 09:01  --------------------------------------------------------  IN: 240 mL / OUT: 0 mL / NET: 240 mL        Weight (kg): 86.3 ( @ 09:22)    PHYSICAL EXAM:    Constitutional: NAD  Neck:  No JVD  Respiratory: CTAB/L  Cardiovascular: S1 and S2  Gastrointestinal: BS+, soft, NT/ND  Extremities: wrinkled  peripheral edema  Neurological: A/O x 3, no focal deficits  Psychiatric: Normal mood, normal affect  : No Dowling  Skin: No rashes  Access: Not applicable    LABS:                        10.3   12.02 )-----------( 306      ( 2020 05:51 )             33.3         137  |  103  |  24<H>  ----------------------------<  48<LL>  4.1   |  25  |  1.45<H>    Ca    9.0      2020 05:51  Phos  3.1       Mg     2.0                 Urine Studies:  Urinalysis Basic - ( 15 Nov 2020 18:25 )    Color: Light Yellow / Appearance: Clear / S.012 / pH: x  Gluc: x / Ketone: Negative  / Bili: Negative / Urobili: <2 mg/dL   Blood: x / Protein: Trace / Nitrite: Negative   Leuk Esterase: Negative / RBC: 1 /HPF / WBC 20 /HPF   Sq Epi: x / Non Sq Epi: 2 /HPF / Bacteria: Negative            RADIOLOGY & ADDITIONAL STUDIES:             NEPHROLOGY-[facility] [phone]        Patient seen and examined in bed. She was about the same         MEDICATIONS  (STANDING):  amLODIPine   Tablet 5 milliGRAM(s) Oral daily  aspirin enteric coated 81 milliGRAM(s) Oral daily  atorvastatin 40 milliGRAM(s) Oral at bedtime  chlorhexidine 2% Cloths 1 Application(s) Topical <User Schedule>  dextrose 40% Gel 15 Gram(s) Oral once  dextrose 40% Gel 15 Gram(s) Oral once  dextrose 5%. 1000 milliLiter(s) (50 mL/Hr) IV Continuous <Continuous>  dextrose 5%. 1000 milliLiter(s) (100 mL/Hr) IV Continuous <Continuous>  dextrose 50% Injectable 25 Gram(s) IV Push once  dextrose 50% Injectable 12.5 Gram(s) IV Push once  dextrose 50% Injectable 25 Gram(s) IV Push once  famotidine Injectable 20 milliGRAM(s) IV Push every 12 hours  glucagon  Injectable 1 milliGRAM(s) IntraMuscular once  insulin glargine Injectable (LANTUS) 34 Unit(s) SubCutaneous at bedtime  insulin lispro (ADMELOG) corrective regimen sliding scale   SubCutaneous three times a day before meals  insulin lispro (ADMELOG) corrective regimen sliding scale   SubCutaneous at bedtime  insulin lispro Injectable (ADMELOG) 3 Unit(s) SubCutaneous three times a day before meals  metoprolol tartrate 75 milliGRAM(s) Oral two times a day  polyethylene glycol 3350 17 Gram(s) Oral daily  sodium chloride 0.9%. 1000 milliLiter(s) (10 mL/Hr) IV Continuous <Continuous>      VITAL:  T(C): , Max: 37.1 (20 @ 05:06)  T(F): , Max: 98.8 (20 @ 05:06)  HR: 73 (20 @ 05:06)  BP: 124/79 (20 @ 05:06)  BP(mean): 94 (20 @ 05:06)  RR: 18 (20 @ 05:06)  SpO2: 97% (20 @ 05:06)  Wt(kg): --    I and O's:     @ 07:01  -   @ 07:00  --------------------------------------------------------  IN: 900 mL / OUT: 1550 mL / NET: -650 mL     @ 07:01  -   @ 09:01  --------------------------------------------------------  IN: 240 mL / OUT: 0 mL / NET: 240 mL        Weight (kg): 86.3 ( @ 09:22)    PHYSICAL EXAM:    Constitutional: NAD  Neck:  No JVD  Respiratory: CTAB/L  Cardiovascular: S1 and S2  Gastrointestinal: BS+, soft, NT/ND  Extremities: trace LE peripheral edema  Neurological: A/O x 3, no focal deficits  Psychiatric: Normal mood, normal affect  : No Dowling  Skin: No rashes  Access: Not applicable    LABS:                        10.3   12.02 )-----------( 306      ( 2020 05:51 )             33.3         137  |  103  |  24<H>  ----------------------------<  48<LL>  4.1   |  25  |  1.45<H>    Ca    9.0      2020 05:51  Phos  3.1       Mg     2.0                 Urine Studies:  Urinalysis Basic - ( 15 Nov 2020 18:25 )    Color: Light Yellow / Appearance: Clear / S.012 / pH: x  Gluc: x / Ketone: Negative  / Bili: Negative / Urobili: <2 mg/dL   Blood: x / Protein: Trace / Nitrite: Negative   Leuk Esterase: Negative / RBC: 1 /HPF / WBC 20 /HPF   Sq Epi: x / Non Sq Epi: 2 /HPF / Bacteria: Negative            RADIOLOGY & ADDITIONAL STUDIES:

## 2020-11-17 NOTE — DISCHARGE NOTE PROVIDER - CARE PROVIDERS DIRECT ADDRESSES
[email or web address],DirectAddress_Unknown,DirectAddress_Unknown

## 2020-11-17 NOTE — PROGRESS NOTE ADULT - PROBLEM SELECTOR PLAN 1
Will decrease Lantus to 10 units at bed time.  Will continue Humalog 3 units before each meal as well as Humalog correction scale coverage. Will continue monitoring FS and FU.  Patient with decreasing insulin requirement, she will not require insulin as outpatient. Suggest DC on Januvia 50mg daily with endo FU 2 weeks.  Patient counseled for compliance with consistent low carb diet.  Discussed plan with primary team.

## 2020-11-18 ENCOUNTER — TRANSCRIPTION ENCOUNTER (OUTPATIENT)
Age: 80
End: 2020-11-18

## 2020-11-18 RX ORDER — METOPROLOL TARTRATE 50 MG
1.5 TABLET ORAL
Qty: 0 | Refills: 0 | DISCHARGE
Start: 2020-11-18

## 2020-11-20 NOTE — PATIENT PROFILE ADULT - NSASFALLWHENOCCURRED_GEN_A_NUR
Patient Education     Wrist Sprain  A sprain is an injury to the ligaments or capsule that holds a joint together. There are no broken bones. Most sprains take about 3 to 6 weeks to heal. If it a severe sprain where the ligament is completely torn, it can take months to recover.     Most wrist sprains are treated with a splint, wrist brace, or elastic wrap for support. Severe sprains may require surgery.  Home care  · Keep your arm elevated to reduce pain and swelling. This is very important during the first 48 hours.  · Apply an ice pack over the injured area for 15 to 20 minutes every 3 to 6 hours. You should do this for the first 24 to 48 hours. You can make an ice pack by filling a plastic bag that seals at the top with ice cubes and then wrapping it with a thin towel. Continue to use ice packs for relief of pain and swelling as needed. As the ice melts, be careful to avoid getting your wrap, splint, or cast wet. After 48 hours, apply heat (warm shower or warm bath) for 15 to 20 minutes several times a day, or alternate ice and heat.   · You may use over-the-counter pain medicine to control pain, unless another pain medicine was prescribed. If you have chronic liver or kidney disease or ever had a stomach ulcer or GI bleeding, talk with your doctor before using these medicines.  · If you were given a splint or brace, wear it for the time advised by your doctor.  Follow-up care  Follow up with your healthcare provider as advised. Any X-rays you had today don’t show any broken bones, breaks, or fractures. Sometimes fractures don’t show up on the first X-ray. Bruises and sprains can sometimes hurt as much as a fracture. These injuries can take time to heal completely. If your symptoms don’t improve or they get worse, talk with your doctor. You may need a repeat X-ray. If X-rays were taken, you will be told of any new findings that may affect your care.  When to seek medical advice  Call your healthcare provider  right away if any of these occur:  · Pain or swelling increases  · Fingers or hand becomes cold, blue, numb, or tingly  Date Last Reviewed: 11/20/2015  © 4545-7523 [employer]. [address] [place] [address]. All rights reserved. This information is not intended as a substitute for professional medical care. Always follow your healthcare professional's instructions.      General Orthopedic Surgery Department:     [facility]: [phone]  Dr. Ashvin Bailey    [place]: [phone]  Dr. Casey Bailey    [place]: [phone]  Dr. Ish Rushing            last six months

## 2020-11-23 ENCOUNTER — APPOINTMENT (OUTPATIENT)
Dept: INTERNAL MEDICINE | Facility: CLINIC | Age: 80
End: 2020-11-23

## 2020-11-23 ENCOUNTER — APPOINTMENT (OUTPATIENT)
Dept: UROLOGY | Facility: CLINIC | Age: 80
End: 2020-11-23

## 2020-11-23 ENCOUNTER — APPOINTMENT (OUTPATIENT)
Dept: CARDIOLOGY | Facility: CLINIC | Age: 80
End: 2020-11-23

## 2020-11-24 ENCOUNTER — TRANSCRIPTION ENCOUNTER (OUTPATIENT)
Age: 80
End: 2020-11-24

## 2020-12-03 ENCOUNTER — TRANSCRIPTION ENCOUNTER (OUTPATIENT)
Age: 80
End: 2020-12-03

## 2020-12-09 ENCOUNTER — TRANSCRIPTION ENCOUNTER (OUTPATIENT)
Age: 80
End: 2020-12-09

## 2020-12-16 PROBLEM — Z87.09 HISTORY OF ACUTE SINUSITIS: Status: RESOLVED | Noted: 2018-04-26 | Resolved: 2020-12-16

## 2020-12-17 ENCOUNTER — TRANSCRIPTION ENCOUNTER (OUTPATIENT)
Age: 80
End: 2020-12-17

## 2021-01-01 ENCOUNTER — INPATIENT (INPATIENT)
Facility: HOSPITAL | Age: 81
LOS: 2 days | Discharge: HOME HEALTH | DRG: 300 | End: 2021-01-04
Attending: HOSPITALIST | Admitting: HOSPITALIST
Payer: MEDICARE

## 2021-01-01 VITALS
HEIGHT: 60 IN | OXYGEN SATURATION: 98 % | WEIGHT: 175.05 LBS | SYSTOLIC BLOOD PRESSURE: 140 MMHG | DIASTOLIC BLOOD PRESSURE: 64 MMHG | HEART RATE: 18 BPM | TEMPERATURE: 99 F

## 2021-01-01 DIAGNOSIS — Z90.11 ACQUIRED ABSENCE OF RIGHT BREAST AND NIPPLE: Chronic | ICD-10-CM

## 2021-01-01 DIAGNOSIS — Z90.49 ACQUIRED ABSENCE OF OTHER SPECIFIED PARTS OF DIGESTIVE TRACT: Chronic | ICD-10-CM

## 2021-01-01 DIAGNOSIS — Z93.2 ILEOSTOMY STATUS: Chronic | ICD-10-CM

## 2021-01-01 DIAGNOSIS — Z98.89 OTHER SPECIFIED POSTPROCEDURAL STATES: Chronic | ICD-10-CM

## 2021-01-01 DIAGNOSIS — L03.115 CELLULITIS OF RIGHT LOWER LIMB: ICD-10-CM

## 2021-01-01 DIAGNOSIS — Z96.651 PRESENCE OF RIGHT ARTIFICIAL KNEE JOINT: Chronic | ICD-10-CM

## 2021-01-01 LAB
ALBUMIN SERPL ELPH-MCNC: 3.4 G/DL — SIGNIFICANT CHANGE UP (ref 3.3–5)
ALP SERPL-CCNC: 86 U/L — SIGNIFICANT CHANGE UP (ref 40–120)
ALT FLD-CCNC: 8 U/L — LOW (ref 10–45)
ANION GAP SERPL CALC-SCNC: 12 MMOL/L — SIGNIFICANT CHANGE UP (ref 5–17)
APPEARANCE UR: CLEAR — SIGNIFICANT CHANGE UP
APTT BLD: 32.2 SEC — SIGNIFICANT CHANGE UP (ref 27.5–35.5)
AST SERPL-CCNC: 16 U/L — SIGNIFICANT CHANGE UP (ref 10–40)
BACTERIA # UR AUTO: NEGATIVE — SIGNIFICANT CHANGE UP
BASE EXCESS BLDV CALC-SCNC: 0.9 MMOL/L — SIGNIFICANT CHANGE UP (ref -2–2)
BASOPHILS # BLD AUTO: 0.02 K/UL — SIGNIFICANT CHANGE UP (ref 0–0.2)
BASOPHILS NFR BLD AUTO: 0.3 % — SIGNIFICANT CHANGE UP (ref 0–2)
BILIRUB SERPL-MCNC: 0.1 MG/DL — LOW (ref 0.2–1.2)
BILIRUB UR-MCNC: NEGATIVE — SIGNIFICANT CHANGE UP
BUN SERPL-MCNC: 32 MG/DL — HIGH (ref 7–23)
CA-I SERPL-SCNC: 1.22 MMOL/L — SIGNIFICANT CHANGE UP (ref 1.12–1.3)
CALCIUM SERPL-MCNC: 9.1 MG/DL — SIGNIFICANT CHANGE UP (ref 8.4–10.5)
CHLORIDE BLDV-SCNC: 107 MMOL/L — SIGNIFICANT CHANGE UP (ref 96–108)
CHLORIDE SERPL-SCNC: 105 MMOL/L — SIGNIFICANT CHANGE UP (ref 96–108)
CO2 BLDV-SCNC: 28 MMOL/L — SIGNIFICANT CHANGE UP (ref 22–30)
CO2 SERPL-SCNC: 23 MMOL/L — SIGNIFICANT CHANGE UP (ref 22–31)
COLOR SPEC: SIGNIFICANT CHANGE UP
CREAT SERPL-MCNC: 1.47 MG/DL — HIGH (ref 0.5–1.3)
DIFF PNL FLD: NEGATIVE — SIGNIFICANT CHANGE UP
EOSINOPHIL # BLD AUTO: 0.1 K/UL — SIGNIFICANT CHANGE UP (ref 0–0.5)
EOSINOPHIL NFR BLD AUTO: 1.3 % — SIGNIFICANT CHANGE UP (ref 0–6)
EPI CELLS # UR: 6 /HPF — HIGH
GAS PNL BLDV: 140 MMOL/L — SIGNIFICANT CHANGE UP (ref 135–145)
GAS PNL BLDV: SIGNIFICANT CHANGE UP
GAS PNL BLDV: SIGNIFICANT CHANGE UP
GLUCOSE BLDV-MCNC: 192 MG/DL — HIGH (ref 70–99)
GLUCOSE SERPL-MCNC: 195 MG/DL — HIGH (ref 70–99)
GLUCOSE UR QL: ABNORMAL
HCO3 BLDV-SCNC: 26 MMOL/L — SIGNIFICANT CHANGE UP (ref 21–29)
HCT VFR BLD CALC: 32.8 % — LOW (ref 34.5–45)
HCT VFR BLDA CALC: 30 % — LOW (ref 39–50)
HGB BLD CALC-MCNC: 9.9 G/DL — LOW (ref 11.5–15.5)
HGB BLD-MCNC: 9.7 G/DL — LOW (ref 11.5–15.5)
HYALINE CASTS # UR AUTO: 2 /LPF — SIGNIFICANT CHANGE UP (ref 0–2)
IMM GRANULOCYTES NFR BLD AUTO: 0.3 % — SIGNIFICANT CHANGE UP (ref 0–1.5)
INR BLD: 1.04 RATIO — SIGNIFICANT CHANGE UP (ref 0.88–1.16)
KETONES UR-MCNC: NEGATIVE — SIGNIFICANT CHANGE UP
LACTATE BLDV-MCNC: 1.5 MMOL/L — SIGNIFICANT CHANGE UP (ref 0.7–2)
LEUKOCYTE ESTERASE UR-ACNC: NEGATIVE — SIGNIFICANT CHANGE UP
LYMPHOCYTES # BLD AUTO: 1.52 K/UL — SIGNIFICANT CHANGE UP (ref 1–3.3)
LYMPHOCYTES # BLD AUTO: 19.7 % — SIGNIFICANT CHANGE UP (ref 13–44)
MCHC RBC-ENTMCNC: 26.1 PG — LOW (ref 27–34)
MCHC RBC-ENTMCNC: 29.6 GM/DL — LOW (ref 32–36)
MCV RBC AUTO: 88.4 FL — SIGNIFICANT CHANGE UP (ref 80–100)
MONOCYTES # BLD AUTO: 0.69 K/UL — SIGNIFICANT CHANGE UP (ref 0–0.9)
MONOCYTES NFR BLD AUTO: 9 % — SIGNIFICANT CHANGE UP (ref 2–14)
NEUTROPHILS # BLD AUTO: 5.35 K/UL — SIGNIFICANT CHANGE UP (ref 1.8–7.4)
NEUTROPHILS NFR BLD AUTO: 69.4 % — SIGNIFICANT CHANGE UP (ref 43–77)
NITRITE UR-MCNC: NEGATIVE — SIGNIFICANT CHANGE UP
NRBC # BLD: 0 /100 WBCS — SIGNIFICANT CHANGE UP (ref 0–0)
OTHER CELLS CSF MANUAL: 11 ML/DL — LOW (ref 18–22)
PCO2 BLDV: 49 MMHG — SIGNIFICANT CHANGE UP (ref 35–50)
PH BLDV: 7.35 — SIGNIFICANT CHANGE UP (ref 7.35–7.45)
PH UR: 6 — SIGNIFICANT CHANGE UP (ref 5–8)
PLATELET # BLD AUTO: 333 K/UL — SIGNIFICANT CHANGE UP (ref 150–400)
PO2 BLDV: 46 MMHG — HIGH (ref 25–45)
POTASSIUM BLDV-SCNC: 4 MMOL/L — SIGNIFICANT CHANGE UP (ref 3.5–5.3)
POTASSIUM SERPL-MCNC: 4.4 MMOL/L — SIGNIFICANT CHANGE UP (ref 3.5–5.3)
POTASSIUM SERPL-SCNC: 4.4 MMOL/L — SIGNIFICANT CHANGE UP (ref 3.5–5.3)
PROT SERPL-MCNC: 6.8 G/DL — SIGNIFICANT CHANGE UP (ref 6–8.3)
PROT UR-MCNC: ABNORMAL
PROTHROM AB SERPL-ACNC: 12.5 SEC — SIGNIFICANT CHANGE UP (ref 10.6–13.6)
RBC # BLD: 3.71 M/UL — LOW (ref 3.8–5.2)
RBC # FLD: 15.1 % — HIGH (ref 10.3–14.5)
RBC CASTS # UR COMP ASSIST: 5 /HPF — HIGH (ref 0–4)
SAO2 % BLDV: 78 % — SIGNIFICANT CHANGE UP (ref 67–88)
SARS-COV-2 RNA SPEC QL NAA+PROBE: SIGNIFICANT CHANGE UP
SODIUM SERPL-SCNC: 140 MMOL/L — SIGNIFICANT CHANGE UP (ref 135–145)
SP GR SPEC: 1.02 — SIGNIFICANT CHANGE UP (ref 1.01–1.02)
UROBILINOGEN FLD QL: NEGATIVE — SIGNIFICANT CHANGE UP
WBC # BLD: 7.7 K/UL — SIGNIFICANT CHANGE UP (ref 3.8–10.5)
WBC # FLD AUTO: 7.7 K/UL — SIGNIFICANT CHANGE UP (ref 3.8–10.5)
WBC UR QL: 7 /HPF — HIGH (ref 0–5)

## 2021-01-01 PROCEDURE — 99284 EMERGENCY DEPT VISIT MOD MDM: CPT

## 2021-01-01 PROCEDURE — 73502 X-RAY EXAM HIP UNI 2-3 VIEWS: CPT | Mod: 26,RT

## 2021-01-01 PROCEDURE — 93971 EXTREMITY STUDY: CPT | Mod: 26,RT

## 2021-01-01 PROCEDURE — 93010 ELECTROCARDIOGRAM REPORT: CPT

## 2021-01-01 PROCEDURE — 71045 X-RAY EXAM CHEST 1 VIEW: CPT | Mod: 26

## 2021-01-01 RX ORDER — VANCOMYCIN HCL 1 G
VIAL (EA) INTRAVENOUS
Refills: 0 | Status: DISCONTINUED | OUTPATIENT
Start: 2021-01-01 | End: 2021-01-01

## 2021-01-01 RX ORDER — ACETAMINOPHEN 500 MG
975 TABLET ORAL ONCE
Refills: 0 | Status: COMPLETED | OUTPATIENT
Start: 2021-01-01 | End: 2021-01-01

## 2021-01-01 RX ORDER — VANCOMYCIN HCL 1 G
1250 VIAL (EA) INTRAVENOUS EVERY 24 HOURS
Refills: 0 | Status: DISCONTINUED | OUTPATIENT
Start: 2021-01-01 | End: 2021-01-03

## 2021-01-01 RX ADMIN — Medication 250 MILLIGRAM(S): at 21:47

## 2021-01-01 RX ADMIN — Medication 975 MILLIGRAM(S): at 23:18

## 2021-01-01 NOTE — ED PROVIDER NOTE - CLINICAL SUMMARY MEDICAL DECISION MAKING FREE TEXT BOX
Dr. Garcia Note: acute RLE cellulitis with pain, r/o DVT, otherwise likely cellulitis with acute on chronic hip pain likely due to arthritis in need of eventual orthopaedic intervention, will likely require inpatient for pain control, PT, antibiotics, and possible ortho intervention

## 2021-01-01 NOTE — ED ADULT NURSE REASSESSMENT NOTE - NS ED NURSE REASSESS COMMENT FT1
Patient in no acute distress. Patient reports 4/10 pain in the RLE currently. Patient to receive Tylenol as per MDs orders. Denies chest pain, SOB, headache, N/V/D, abdominal pain, fever/chills currently. Patient pending admission to inpatient medicine unit. Plan of care discussed. Safety and comfort measures maintained.

## 2021-01-01 NOTE — ED ADULT NURSE NOTE - NSIMPLEMENTINTERV_GEN_ALL_ED
Implemented All Fall Risk Interventions:  [place] to call system. Call bell, personal items and telephone within reach. Instruct patient to call for assistance. Room bathroom lighting operational. Non-slip footwear when patient is off stretcher. Physically safe environment: no spills, clutter or unnecessary equipment. Stretcher in lowest position, wheels locked, appropriate side rails in place. Provide visual cue, wrist band, yellow gown, etc. Monitor gait and stability. Monitor for mental status changes and reorient to person, place, and time. Review medications for side effects contributing to fall risk. Reinforce activity limits and safety measures with patient and family.

## 2021-01-01 NOTE — ED PROVIDER NOTE - ATTENDING CONTRIBUTION TO CARE
Pt s/p rehab after cardiac bypass and for hip pain, presents one week after rehab with acute RLE pain lateral hip radiating down the leg along with R lower leg reddness similar to cellulitis few weeks ago, worse with time, not amenable to oxycodone at home.  Exam: td with movement of R hip without signs of cellulitis in that area; +cellulitis of RLE with venous stasis, +DP pulse strong b/l.  Well appearing otherwise.   used. Also spoke to pt's daughter on phone for additional history.

## 2021-01-01 NOTE — ED ADULT TRIAGE NOTE - TEMPERATURE IN FAHRENHEIT (DEGREES F)
INSURANCE COVERAGE REGARDING PAYMENT  FOR YOUR COLONOSCOPY        Colon Cancer is the second leading cause of death among cancers, per the American Cancer Society. It is preventable. Early detection is the key. Your doctor will determine which tests need to be done for prevention and/or treatment. However, colonoscopies can be performed for several reasons:     Screening To screen for any problems within the colon. In this case, the patient is not symptomatic and does not have a personal history of previous colon cancer/condition or abnormal findings. Billed as screening.   Surveillance To monitor for a previously diagnosed colon condition (such as polyps) or when performed at more frequent intervals than every ten years because the patient has a personal/family history of colonic polyps or colon cancer. Billed as diagnostic.   Diagnostic Patient with symptoms, used to evaluate the colon. Billed as diagnostic.       If during a screening colonoscopy, our physician finds an abnormality, performs a biopsy or polypectomy (removal of polyp), your insurance company may consider the procedure to be a diagnostic exam and no longer a screening procedure.     Every insurance company is different. We encourage you to call your insurance company regarding plan benefits. Generally, screening benefits and diagnostic benefits may be paid at different levels. Charges associated with anesthesia or type of facility may also be processed differently. This varies with each insurance company, so we want you to be aware of this prior to your procedure. You do not have to call your insurance company if you have Medicare. For an estimate of potential charges, you may call the [facility] Patient Contact Center at [phone], option 5.     The authorization staff at [facility] will contact your insurance company to check precertification requirements for your colonoscopy. However, precertification, which serves as notification  is never a guarantee of payment. If you have questions regarding precertification for your procedure, please contact your insurance company. If you need further assistance call our authorization department at [phone].   98.7

## 2021-01-01 NOTE — ED ADULT NURSE NOTE - OBJECTIVE STATEMENT
80F to ED bib EMS for RLE pain and reddness. Patient had CABG x2 1 week ago. Patient states that the pain has worsened in the last three days. Patient last took a 5mg percocet at 1600 PTA. Patient is alert and oriented x3, calm/cooperative, NAD, VSS. Patient has 20 gauge IV to L FA. Patient has R arm pink-banded and it appears swollen. Patient is primarily Vincentian speaking but is able to make her needs met in English. Patient is changed into a hospital gown, given the call bell and verbalizes understanding of its use.

## 2021-01-01 NOTE — ED ADULT NURSE REASSESSMENT NOTE - NS ED NURSE REASSESS COMMENT FT1
Received report from Luciano FLORES. Patient resting comfortably in stretcher. A&Ox4. Patient in no acute distress. Patient pending x-ray and ultrasound of RLE. Denies chest pain, SOB, headache, N/V/D, abdominal pain, fever/chills, burning upon urination or difficulty urinating currently. Plan of care discussed. Safety and comfort measures maintained.

## 2021-01-01 NOTE — ED PROVIDER NOTE - OBJECTIVE STATEMENT
DWIGHT Aguilar [id number]  81yo woman PMH breast cancer s/p resection with chronic right arm lymphedema, DVT, CHF, CAD, HTN, HLD, DM, recent CABAG coming in with right hip and thigh pain over the past 3 days.  No injury, has history of bad OA in the hip.  Pt denies any injury but the pain has been bad over the past 3 days.  Pain worse with movement better with rest.  Spoke with daughter who states that she recently got out of rehab.  While in rehab was having the same issue of leg redness that got better with levaquin.  Today she reports that the redness got much worse today and the pain in the right hip got worse to the point that she couldn't walk so they called 911.  Pt was scheduled for ahip replacement but during pre op they found heart disease prompting the CABAG. DWIGHT Aguilar [id number]  81yo woman PMH breast cancer s/p resection with chronic right arm lymphedema, DVT (on eliquis), CHF, CAD, HTN, HLD, DM, recent CABAG coming in with right hip and thigh pain over the past 3 days.  No injury, has history of bad OA in the hip.  Pt denies any injury but the pain has been bad over the past 3 days.  Pain worse with movement better with rest.  Spoke with daughter who states that she recently got out of rehab.  While in rehab was having the same issue of leg redness that got better with levaquin.  Today she reports that the redness got much worse today and the pain in the right hip got worse to the point that she couldn't walk so they called 911.  Pt was scheduled for ahip replacement but during pre op they found heart disease prompting the CABAG.

## 2021-01-02 DIAGNOSIS — I10 ESSENTIAL (PRIMARY) HYPERTENSION: ICD-10-CM

## 2021-01-02 DIAGNOSIS — N28.9 DISORDER OF KIDNEY AND URETER, UNSPECIFIED: ICD-10-CM

## 2021-01-02 DIAGNOSIS — Z79.899 OTHER LONG TERM (CURRENT) DRUG THERAPY: ICD-10-CM

## 2021-01-02 DIAGNOSIS — I25.810 ATHEROSCLEROSIS OF CORONARY ARTERY BYPASS GRAFT(S) WITHOUT ANGINA PECTORIS: ICD-10-CM

## 2021-01-02 DIAGNOSIS — I50.9 HEART FAILURE, UNSPECIFIED: ICD-10-CM

## 2021-01-02 DIAGNOSIS — Z02.9 ENCOUNTER FOR ADMINISTRATIVE EXAMINATIONS, UNSPECIFIED: ICD-10-CM

## 2021-01-02 DIAGNOSIS — L03.115 CELLULITIS OF RIGHT LOWER LIMB: ICD-10-CM

## 2021-01-02 DIAGNOSIS — Z29.9 ENCOUNTER FOR PROPHYLACTIC MEASURES, UNSPECIFIED: ICD-10-CM

## 2021-01-02 DIAGNOSIS — M16.11 UNILATERAL PRIMARY OSTEOARTHRITIS, RIGHT HIP: ICD-10-CM

## 2021-01-02 DIAGNOSIS — E11.9 TYPE 2 DIABETES MELLITUS WITHOUT COMPLICATIONS: ICD-10-CM

## 2021-01-02 LAB
A1C WITH ESTIMATED AVERAGE GLUCOSE RESULT: 7.2 % — HIGH (ref 4–5.6)
ANION GAP SERPL CALC-SCNC: 13 MMOL/L — SIGNIFICANT CHANGE UP (ref 5–17)
BUN SERPL-MCNC: 28 MG/DL — HIGH (ref 7–23)
CALCIUM SERPL-MCNC: 9 MG/DL — SIGNIFICANT CHANGE UP (ref 8.4–10.5)
CHLORIDE SERPL-SCNC: 105 MMOL/L — SIGNIFICANT CHANGE UP (ref 96–108)
CO2 SERPL-SCNC: 23 MMOL/L — SIGNIFICANT CHANGE UP (ref 22–31)
CREAT SERPL-MCNC: 1.46 MG/DL — HIGH (ref 0.5–1.3)
ESTIMATED AVERAGE GLUCOSE: 160 MG/DL — HIGH (ref 68–114)
GLUCOSE BLDC GLUCOMTR-MCNC: 129 MG/DL — HIGH (ref 70–99)
GLUCOSE BLDC GLUCOMTR-MCNC: 144 MG/DL — HIGH (ref 70–99)
GLUCOSE BLDC GLUCOMTR-MCNC: 156 MG/DL — HIGH (ref 70–99)
GLUCOSE BLDC GLUCOMTR-MCNC: 174 MG/DL — HIGH (ref 70–99)
GLUCOSE SERPL-MCNC: 121 MG/DL — HIGH (ref 70–99)
HCT VFR BLD CALC: 30.3 % — LOW (ref 34.5–45)
HGB BLD-MCNC: 9.1 G/DL — LOW (ref 11.5–15.5)
MAGNESIUM SERPL-MCNC: 2 MG/DL — SIGNIFICANT CHANGE UP (ref 1.6–2.6)
MCHC RBC-ENTMCNC: 26.8 PG — LOW (ref 27–34)
MCHC RBC-ENTMCNC: 30 GM/DL — LOW (ref 32–36)
MCV RBC AUTO: 89.4 FL — SIGNIFICANT CHANGE UP (ref 80–100)
NRBC # BLD: 0 /100 WBCS — SIGNIFICANT CHANGE UP (ref 0–0)
PHOSPHATE SERPL-MCNC: 3.5 MG/DL — SIGNIFICANT CHANGE UP (ref 2.5–4.5)
PLATELET # BLD AUTO: 322 K/UL — SIGNIFICANT CHANGE UP (ref 150–400)
POTASSIUM SERPL-MCNC: 3.8 MMOL/L — SIGNIFICANT CHANGE UP (ref 3.5–5.3)
POTASSIUM SERPL-SCNC: 3.8 MMOL/L — SIGNIFICANT CHANGE UP (ref 3.5–5.3)
RBC # BLD: 3.39 M/UL — LOW (ref 3.8–5.2)
RBC # FLD: 15.3 % — HIGH (ref 10.3–14.5)
SARS-COV-2 IGG SERPL QL IA: NEGATIVE — SIGNIFICANT CHANGE UP
SARS-COV-2 IGM SERPL IA-ACNC: <0.1 INDEX — SIGNIFICANT CHANGE UP
SODIUM SERPL-SCNC: 141 MMOL/L — SIGNIFICANT CHANGE UP (ref 135–145)
WBC # BLD: 6.89 K/UL — SIGNIFICANT CHANGE UP (ref 3.8–10.5)
WBC # FLD AUTO: 6.89 K/UL — SIGNIFICANT CHANGE UP (ref 3.8–10.5)

## 2021-01-02 PROCEDURE — 93971 EXTREMITY STUDY: CPT | Mod: 26

## 2021-01-02 PROCEDURE — 93010 ELECTROCARDIOGRAM REPORT: CPT

## 2021-01-02 PROCEDURE — 99223 1ST HOSP IP/OBS HIGH 75: CPT | Mod: GC

## 2021-01-02 RX ORDER — POLYETHYLENE GLYCOL 3350 17 G/17G
17 POWDER, FOR SOLUTION ORAL DAILY
Refills: 0 | Status: DISCONTINUED | OUTPATIENT
Start: 2021-01-02 | End: 2021-01-04

## 2021-01-02 RX ORDER — INSULIN LISPRO 100/ML
VIAL (ML) SUBCUTANEOUS AT BEDTIME
Refills: 0 | Status: DISCONTINUED | OUTPATIENT
Start: 2021-01-02 | End: 2021-01-02

## 2021-01-02 RX ORDER — SODIUM CHLORIDE 9 MG/ML
1000 INJECTION, SOLUTION INTRAVENOUS
Refills: 0 | Status: DISCONTINUED | OUTPATIENT
Start: 2021-01-02 | End: 2021-01-02

## 2021-01-02 RX ORDER — DEXTROSE 50 % IN WATER 50 %
25 SYRINGE (ML) INTRAVENOUS ONCE
Refills: 0 | Status: DISCONTINUED | OUTPATIENT
Start: 2021-01-02 | End: 2021-01-02

## 2021-01-02 RX ORDER — ATORVASTATIN CALCIUM 80 MG/1
40 TABLET, FILM COATED ORAL AT BEDTIME
Refills: 0 | Status: DISCONTINUED | OUTPATIENT
Start: 2021-01-02 | End: 2021-01-04

## 2021-01-02 RX ORDER — CEFEPIME 1 G/1
1000 INJECTION, POWDER, FOR SOLUTION INTRAMUSCULAR; INTRAVENOUS EVERY 24 HOURS
Refills: 0 | Status: DISCONTINUED | OUTPATIENT
Start: 2021-01-02 | End: 2021-01-02

## 2021-01-02 RX ORDER — ASPIRIN/CALCIUM CARB/MAGNESIUM 324 MG
81 TABLET ORAL DAILY
Refills: 0 | Status: DISCONTINUED | OUTPATIENT
Start: 2021-01-02 | End: 2021-01-04

## 2021-01-02 RX ORDER — GLUCAGON INJECTION, SOLUTION 0.5 MG/.1ML
1 INJECTION, SOLUTION SUBCUTANEOUS ONCE
Refills: 0 | Status: DISCONTINUED | OUTPATIENT
Start: 2021-01-02 | End: 2021-01-04

## 2021-01-02 RX ORDER — INSULIN LISPRO 100/ML
VIAL (ML) SUBCUTANEOUS
Refills: 0 | Status: DISCONTINUED | OUTPATIENT
Start: 2021-01-02 | End: 2021-01-04

## 2021-01-02 RX ORDER — CEFEPIME 1 G/1
1000 INJECTION, POWDER, FOR SOLUTION INTRAMUSCULAR; INTRAVENOUS ONCE
Refills: 0 | Status: COMPLETED | OUTPATIENT
Start: 2021-01-02 | End: 2021-01-02

## 2021-01-02 RX ORDER — GLUCAGON INJECTION, SOLUTION 0.5 MG/.1ML
1 INJECTION, SOLUTION SUBCUTANEOUS ONCE
Refills: 0 | Status: DISCONTINUED | OUTPATIENT
Start: 2021-01-02 | End: 2021-01-02

## 2021-01-02 RX ORDER — DEXTROSE 50 % IN WATER 50 %
25 SYRINGE (ML) INTRAVENOUS ONCE
Refills: 0 | Status: DISCONTINUED | OUTPATIENT
Start: 2021-01-02 | End: 2021-01-04

## 2021-01-02 RX ORDER — DEXTROSE 50 % IN WATER 50 %
12.5 SYRINGE (ML) INTRAVENOUS ONCE
Refills: 0 | Status: DISCONTINUED | OUTPATIENT
Start: 2021-01-02 | End: 2021-01-04

## 2021-01-02 RX ORDER — DEXTROSE 50 % IN WATER 50 %
15 SYRINGE (ML) INTRAVENOUS ONCE
Refills: 0 | Status: DISCONTINUED | OUTPATIENT
Start: 2021-01-02 | End: 2021-01-02

## 2021-01-02 RX ORDER — DEXTROSE 50 % IN WATER 50 %
12.5 SYRINGE (ML) INTRAVENOUS ONCE
Refills: 0 | Status: DISCONTINUED | OUTPATIENT
Start: 2021-01-02 | End: 2021-01-02

## 2021-01-02 RX ORDER — INSULIN LISPRO 100/ML
VIAL (ML) SUBCUTANEOUS
Refills: 0 | Status: DISCONTINUED | OUTPATIENT
Start: 2021-01-02 | End: 2021-01-02

## 2021-01-02 RX ORDER — METOPROLOL TARTRATE 50 MG
100 TABLET ORAL DAILY
Refills: 0 | Status: DISCONTINUED | OUTPATIENT
Start: 2021-01-02 | End: 2021-01-03

## 2021-01-02 RX ORDER — SODIUM CHLORIDE 9 MG/ML
1000 INJECTION, SOLUTION INTRAVENOUS
Refills: 0 | Status: DISCONTINUED | OUTPATIENT
Start: 2021-01-02 | End: 2021-01-04

## 2021-01-02 RX ORDER — ACETAMINOPHEN 500 MG
650 TABLET ORAL EVERY 6 HOURS
Refills: 0 | Status: DISCONTINUED | OUTPATIENT
Start: 2021-01-02 | End: 2021-01-04

## 2021-01-02 RX ORDER — SENNA PLUS 8.6 MG/1
1 TABLET ORAL DAILY
Refills: 0 | Status: DISCONTINUED | OUTPATIENT
Start: 2021-01-02 | End: 2021-01-04

## 2021-01-02 RX ORDER — INSULIN LISPRO 100/ML
VIAL (ML) SUBCUTANEOUS AT BEDTIME
Refills: 0 | Status: DISCONTINUED | OUTPATIENT
Start: 2021-01-02 | End: 2021-01-04

## 2021-01-02 RX ORDER — APIXABAN 2.5 MG/1
2.5 TABLET, FILM COATED ORAL
Refills: 0 | Status: DISCONTINUED | OUTPATIENT
Start: 2021-01-02 | End: 2021-01-04

## 2021-01-02 RX ORDER — ACETAMINOPHEN 500 MG
650 TABLET ORAL EVERY 6 HOURS
Refills: 0 | Status: DISCONTINUED | OUTPATIENT
Start: 2021-01-02 | End: 2021-01-02

## 2021-01-02 RX ORDER — CEFEPIME 1 G/1
INJECTION, POWDER, FOR SOLUTION INTRAMUSCULAR; INTRAVENOUS
Refills: 0 | Status: DISCONTINUED | OUTPATIENT
Start: 2021-01-02 | End: 2021-01-02

## 2021-01-02 RX ORDER — HEPARIN SODIUM 5000 [USP'U]/ML
5000 INJECTION INTRAVENOUS; SUBCUTANEOUS EVERY 8 HOURS
Refills: 0 | Status: DISCONTINUED | OUTPATIENT
Start: 2021-01-02 | End: 2021-01-02

## 2021-01-02 RX ORDER — DEXTROSE 50 % IN WATER 50 %
15 SYRINGE (ML) INTRAVENOUS ONCE
Refills: 0 | Status: DISCONTINUED | OUTPATIENT
Start: 2021-01-02 | End: 2021-01-04

## 2021-01-02 RX ORDER — OXYCODONE HYDROCHLORIDE 5 MG/1
5 TABLET ORAL EVERY 6 HOURS
Refills: 0 | Status: DISCONTINUED | OUTPATIENT
Start: 2021-01-02 | End: 2021-01-04

## 2021-01-02 RX ORDER — INSULIN GLARGINE 100 [IU]/ML
10 INJECTION, SOLUTION SUBCUTANEOUS EVERY MORNING
Refills: 0 | Status: DISCONTINUED | OUTPATIENT
Start: 2021-01-02 | End: 2021-01-04

## 2021-01-02 RX ADMIN — CEFEPIME 100 MILLIGRAM(S): 1 INJECTION, POWDER, FOR SOLUTION INTRAMUSCULAR; INTRAVENOUS at 09:35

## 2021-01-02 RX ADMIN — Medication 1: at 17:16

## 2021-01-02 RX ADMIN — APIXABAN 2.5 MILLIGRAM(S): 2.5 TABLET, FILM COATED ORAL at 17:19

## 2021-01-02 RX ADMIN — Medication 81 MILLIGRAM(S): at 12:16

## 2021-01-02 RX ADMIN — Medication 250 MILLIGRAM(S): at 21:50

## 2021-01-02 RX ADMIN — INSULIN GLARGINE 10 UNIT(S): 100 INJECTION, SOLUTION SUBCUTANEOUS at 09:40

## 2021-01-02 RX ADMIN — Medication 650 MILLIGRAM(S): at 17:19

## 2021-01-02 RX ADMIN — Medication 1: at 13:02

## 2021-01-02 RX ADMIN — ATORVASTATIN CALCIUM 40 MILLIGRAM(S): 80 TABLET, FILM COATED ORAL at 21:52

## 2021-01-02 RX ADMIN — Medication 650 MILLIGRAM(S): at 16:38

## 2021-01-02 NOTE — H&P ADULT - HISTORY OF PRESENT ILLNESS
80 year old female with hx of CHF, CAD s/p CABG in Nov 2020, R breast cancer (1980s) s/p mastectomy with chronic RUE lymphedema, colon cancer s/p resection, rectal cancer s/p chemo and radiation, HTN, HLD, T2DM, DVT on eliquis, renal insufficiency, and obesity, presenting with RLE pain and redness. Patient known to have severe R hip degeneration and is indicated for total hip replacement on last admission in Nov 2020. However, pre-op workup determined high cardiac risk with patient eventually getting a cath showing three vessel CAD requiring CABG. Hip replacement was deferred and after previous admission, patient was discharged to nursing facility. Patient received PT at the nursing facility and was eventually discharged home on 12/26/20. Patient has consistent R hip pain which would worsen with movement. Patient also experienced some leg swelling when walking. Yesterday, pain was controlled but today, patient felt increased pain on side of leg, R hip, and below the knee accompanied by redness and rash with "bubbles". Patient has experienced this redness in her legs before and was treated with antibiotics in the past. Patient denies fevers, chills, chest pain, SOB, abdominal pain, dysuria, constipation, or diarrhea. Patient currently lives alone at home with visiting nurse that comes 2x a week.  80 year old female with hx of CHF, CAD s/p CABG in Nov 2020, R breast cancer (1980s) s/p mastectomy with chronic RUE lymphedema, colon cancer s/p resection, rectal cancer s/p chemo and radiation, HTN, HLD, T2DM, DVT on eliquis, renal insufficiency, and obesity, presenting with RLE pain and redness. Patient known to have severe R hip degeneration and is indicated for total hip replacement on last admission in Nov 2020. However, pre-op workup determined high cardiac risk with patient eventually getting a cath showing three vessel CAD requiring CABG. Hip replacement was deferred and after previous admission, patient was discharged to nursing facility. Patient received PT at the nursing facility and was eventually discharged home on 12/26/20. Patient has R hip pain which would worsen with movement. Patient also experienced chronic R leg swelling that worsens when walking. Yesterday, pain was controlled but today, patient felt increased pain on R thigh and hip, and below the knee accompanied by redness and rash with "bubbles" in the RLE. Patient first noticed this redness in her legs 3 days ago, that would develop after walking. Blisters would form over affected area and rupture. This has happened in the past before and patient was treated with antibiotics. Patient denies fevers, chills, chest pain, SOB, abdominal pain, dysuria, constipation, or diarrhea. Patient currently lives alone at home with visiting nurse that comes 2x a week.

## 2021-01-02 NOTE — H&P ADULT - PROBLEM SELECTOR PLAN 2
Known severe osteoarthritis of R hip but hip replacement deferred due to recent CABG. Hip radiograph demonstrating severe OA but no fractures  - Seen and evaluated by ortho, no acute intervention at this time given patient's current infection in affected limb  - Pain management: tylenol prn for pain  - PT eval  - weight bearing as tolerated of affected limb

## 2021-01-02 NOTE — CONSULT NOTE ADULT - SUBJECTIVE AND OBJECTIVE BOX
`Orthopedic Surgery Consult Note    HPI:   80 year old female with hx of CHF, CAD s/p CABG in Nov 2020, R breast cancer (1980s) s/p mastectomy with chronic RUE lymphedema, colon cancer s/p resection, rectal cancer s/p chemo and radiation, HTN, HLD, T2DM, DVT on eliquis, renal insufficiency, and obesity, presenting with R lower leg pain and redness.  Patient known to have severe R hip degeneration and was indicated for R MARLA during last admission in Nov 202. However, pre-op workup revealed severe CAD requiring CABG. Hip replacement was then deferred for the time being, and after her CABG the patient was discharged to rehab.  She was discharged from rehab on 12/26/20, and only 3-4d later she began to experience pain in her R hip and lower leg.  The pain worsened to the point of having difficulty walking earlier today, so she came to the ED.  Patient has experienced this redness in her legs before and was treated with antibiotics in the past. Patient denies fevers, chills, chest pain, SOB, abdominal pain, dysuria, constipation, or diarrhea. Patient currently lives alone at home with visiting nurse that comes 2x/week.     Review of systems: As per HPI, otherwise negative.     PAST MEDICAL & SURGICAL HISTORY:  Renal insufficiency    Elevated blood pressure reading in office with diagnosis of hypertension    DVT of leg (deep venous thrombosis)  right calf DVT  2/2019 pt on Eliquis    CHF (congestive heart failure)    Type II diabetes mellitus    Obese    S/P radiation &gt; 12 weeks  2/2015-3/2015    S/P chemotherapy, time since greater than 12 weeks  2/2015-3/2015    Rectal cancer  s/p chemotherapy and  radiation 12 weeks 2/2015 -3/2015    Hyperlipidemia    Lymphedema of arm  right arm s/p mastectomy    Diabetes mellitus    HTN (hypertension)    Breast CA, right  1989    Rectal cancer    Cancer    Lymphedema of arm  Right arm - right mastectomy    Colon cancer  Radiationh &amp; Oral Chemotherapy    Breast CA, right  1989 s/p mastectomy ,IV Chemotherapy    Obesity (BMI 35.0-39.9 without comorbidity)    Hyperlipidemia    HTN (hypertension)    DM (diabetes mellitus)  Type II    S/P TKR (total knee replacement), right  2017    S/P colon resection  2015    S/P ileostomy  low anterior resection-8/10/15, reversal of ileostomy 2016    History of appendectomy  1953    S/P mastectomy, right  1989    S/P colectomy  2015    History of tonsillectomy    H/O mastectomy, right  1988      Family History: FAMILY HISTORY:  Family history of heart attack (Child)    Family history of diabetes mellitus in son    Family history of diabetes mellitus in mother        Medications: MEDICATIONS  (STANDING):  vancomycin  IVPB. 1250 milliGRAM(s) IV Intermittent every 24 hours    MEDICATIONS  (PRN):      T(C): 36.4 (01-02-21 @ 01:01), Max: 37.1 (01-01-21 @ 18:08)  HR: 68 (01-02-21 @ 01:01) (18 - 68)  BP: 111/55 (01-02-21 @ 01:01) (111/55 - 140/64)  RR: 18 (01-02-21 @ 01:01) (18 - 18)  SpO2: 95% (01-02-21 @ 01:01) (95% - 99%)  Wt(kg): --                        9.7    7.70  )-----------( 333      ( 01 Jan 2021 19:15 )             32.8     01-01    140  |  105  |  32<H>  ----------------------------<  195<H>  4.4   |  23  |  1.47<H>    Ca    9.1      01 Jan 2021 19:15    TPro  6.8  /  Alb  3.4  /  TBili  0.1<L>  /  DBili  x   /  AST  16  /  ALT  8<L>  /  AlkPhos  86  01-01      EXAM:  Gen: NAD, alert and oriented  Resp: no increased WOB on RA  RLE:  Skin intact  +erythema and swelling over lower leg, no erythema above knee joint  No appreciable hip or knee joint effusion/warmth  Limited passive ROM of hip and knee due to pain  SILT diffusely  TA/GS/EHL/FHL motor intact  WWP distally, cap refill <2s      Labs:                        9.7    7.70  )-----------( 333      ( 01 Jan 2021 19:15 )             32.8     01-01    140  |  105  |  32<H>  ----------------------------<  195<H>  4.4   |  23  |  1.47<H>    Ca    9.1      01 Jan 2021 19:15    TPro  6.8  /  Alb  3.4  /  TBili  0.1<L>  /  DBili  x   /  AST  16  /  ALT  8<L>  /  AlkPhos  86  01-01    PT/INR - ( 01 Jan 2021 19:15 )   PT: 12.5 sec;   INR: 1.04 ratio         PTT - ( 01 Jan 2021 19:15 )  PTT:32.2 sec      Imaging    XR R hip demonstrates no acute fractures or dislocations, severe hip joint arthrosis      A/P: This is a 80y Female with severe R hip arthrosis, indicated for MARLA but deferred for CABG, who now presented to the ED with R lower leg pain/erythema c/w cellulitis.       - pain control  - WBAT RLE with assistive devices as needed  - PT consult  - Will defer operative treatment of hip arthrosis for now d/t infection in operative extremity  - No acute orthopedic intervention at this time  - Follow up with Dr Guerrero in outpatient office in 1-2 weeks or upon discharge from the hospital, call for appt: [phone]  -

## 2021-01-02 NOTE — PROGRESS NOTE ADULT - PROBLEM SELECTOR PLAN 1
Erythema and warmth of RLE, no blisters, no discharge or fluctuance. US doppler of RLE neg for DVT. Patient with no fevers or elevated WBC. Ddx: cellulitis vs stasis dermatitis.  - Patient s/p vanc 1250mg in ED  - Area marked, monitor for spread  - c/w vancomycin 1250mg for now Erythema and warmth of RLE, no blisters, no discharge or fluctuance. US doppler of RLE neg for DVT. Patient with no fevers or elevated WBC.   - Patient s/p vanc 1250mg in ED  - will c/w Vanc and add Cefepime (renally dose)   - Area marked, monitor for spread

## 2021-01-02 NOTE — CHART NOTE - NSCHARTNOTEFT_GEN_A_CORE
Patient's daughter verified patient has been taking Eliquis 2.5mg BID.    Per outpatient records, patient has a chronic appearing DVT in right femoral vein. She last saw vascular cardiology 1/24/2020 and despite negative b/l LE DVT studies, was told to continue Eliquis for DVT.     Will c/w Eliquis while in hospital and on discharge.     Sonya Carlin MD  PGY-1  Internal Medicine  [facility] # [id number]  [facility] # [phone] Patient's daughter verified patient has been taking Eliquis 2.5mg BID.    Per outpatient records, patient has a chronic appearing DVT in right femoral vein. She last saw vascular cardiology 1/24/2020 and despite negative b/l LE DVT studies, and recommended to continue extended therapy for DVT.     Will c/w Eliquis while in hospital and on discharge.     Sonya Carlin MD  PGY-1  Internal Medicine  [facility] # [id number]  [facility] # [phone]

## 2021-01-02 NOTE — PHYSICAL THERAPY INITIAL EVALUATION ADULT - STRENGTHENING, PT EVAL
Pt will demonstrate good strength in BLE/BUEs to improve limb stability during ADLs/mobility in 4weeks

## 2021-01-02 NOTE — PROGRESS NOTE ADULT - ATTENDING COMMENTS
Patient seen and examined. d/w medicine team as above.   RLE cellulitis: minimal warmth and tenderness. unclear what baseline looks like. Can cont with emperic vanco for now. check trough  Stop cefepime. will monitor for improvement. f/u cultures  H/o DVT: unclear what AC patient is currently on at home. previous h/o eliquis vs coumadin.  LE duplex neg.

## 2021-01-02 NOTE — H&P ADULT - NSHPLABSRESULTS_GEN_ALL_CORE
140  |  105  |  32<H>  ----------------------------<  195<H>  4.4   |  23  |  1.47<H>    Ca    9.1      2021 19:15    TPro  6.8  /  Alb  3.4  /  TBili  0.1<L>  /  DBili  x   /  AST  16  /  ALT  8<L>  /  AlkPhos  86      PT/INR - ( 2021 19:15 )   PT: 12.5 sec;   INR: 1.04 ratio       PTT - ( 2021 19:15 )  PTT:32.2 sec              Urinalysis Basic - ( 2021 21:47 )    Color: Light Yellow / Appearance: Clear / S.025 / pH: x  Gluc: x / Ketone: Negative  / Bili: Negative / Urobili: Negative   Blood: x / Protein: 30 mg/dL / Nitrite: Negative   Leuk Esterase: Negative / RBC: 5 /hpf / WBC 7 /HPF   Sq Epi: x / Non Sq Epi: 6 /hpf / Bacteria: Negative                        9.7    7.70  )-----------( 333      ( 2021 19:15 )             32.8     Blood Gas Source Venous: Venous (21 @ 19:15)     140  |  105  |  32<H>  ----------------------------<  195<H>  4.4   |  23  |  1.47<H>    Ca    9.1      2021 19:15    TPro  6.8  /  Alb  3.4  /  TBili  0.1<L>  /  DBili  x   /  AST  16  /  ALT  8<L>  /  AlkPhos  86      PT/INR - ( 2021 19:15 )   PT: 12.5 sec;   INR: 1.04 ratio       PTT - ( 2021 19:15 )  PTT:32.2 sec              Urinalysis Basic - ( 2021 21:47 )    Color: Light Yellow / Appearance: Clear / S.025 / pH: x  Gluc: x / Ketone: Negative  / Bili: Negative / Urobili: Negative   Blood: x / Protein: 30 mg/dL / Nitrite: Negative   Leuk Esterase: Negative / RBC: 5 /hpf / WBC 7 /HPF   Sq Epi: x / Non Sq Epi: 6 /hpf / Bacteria: Negative                        9.7    7.70  )-----------( 333      ( 2021 19:15 )             32.8     Blood Gas Source Venous: Venous (21 @ 19:15)    IMAGING  < from: [facility] Duplex Lower Ext Vein Scan, Right (21 @ 20:02) >    INTERPRETATION:  CLINICAL INFORMATION: Right leg pain and swelling.    COMPARISON: Right lower extremity ultrasound 2020.    TECHNIQUE: Duplex sonography of the RIGHT LOWER extremity veins with color and spectral Doppler, with and without compression.    FINDINGS:    There is normal compressibility of the right common femoral, femoral and popliteal veins.  The contralateral common femoral vein is patent.  Doppler examination shows normal spontaneous and phasic flow.    No calf vein thrombosis is detected.    IMPRESSION:  No evidence of right lower extremity deep venous thrombosis.    < end of copied text >    CXR - clear lungs  Xray R hip - no acute fracture, severe osteoarthritis

## 2021-01-02 NOTE — H&P ADULT - PROBLEM SELECTOR PLAN 6
- Low ISS   - CC diet  - Monitor FS - F/u HbA1C  - Start lantus 10units in morning  - Low ISS   - CC diet  - Monitor FS

## 2021-01-02 NOTE — CHART NOTE - NSCHARTNOTEFT_GEN_A_CORE
Confidential Drug Utilization Report  Search Terms: rafal dominguez, 1940Search Date: 01/02/2021 12:54:51 PM  Searching on behalf of: [address] [place] [address]  The Drug Utilization Report below displays all of the controlled substance prescriptions, if any, that your patient has filled in the last twelve months. The information displayed on this report is compiled from pharmacy submissions to the Department, and accurately reflects the information as submitted by the pharmacies.    This report was requested by: Sonya Carlin | Reference #: [id number]    Others' Prescriptions  Patient Name: Rafal Alvarez Date: 1940  Address: [address] [place] [id number]: Female  Rx Written	Rx Dispensed	Drug	Quantity	Days Supply	Prescriber Name	Payment Method	Dispenser  11/19/2020	12/11/2020	oxycodone hcl 5 mg tablet	28	7	Misty Torres NP	Insurance	[employer]  11/19/2020	11/19/2020	oxycodone hcl 5 mg tablet	28	7	Misty Torres NP	Insurance	[employer]  10/16/2020	10/16/2020	oxycodone-acetaminophen  mg tab	90	30	Vince, Kim	[facility]  09/18/2020	09/18/2020	oxycodone-acetaminophen  mg tab	90	30	Vince, Kim	Insurance	[facility]  08/14/2020	08/14/2020	oxycodone-acetaminophen  mg tab	90	30	Chepe Moon M	Insurance	[facility]  07/17/2020	07/20/2020	oxycodone-acetaminophen  mg tab	90	30	VinceKim whitt	Insurance	[facility]  06/19/2020	06/20/2020	oxycodone-acetaminophen  mg tab	90	30	Nallely Wolf	Insurance	[facility]  05/20/2020	05/20/2020	oxycodone-acetaminophen  mg tab	90	30	Rolan Dangelo	[facility]  04/20/2020	04/23/2020	xtampza er 9 mg capsule	60	30	Rolan Dangelo	[facility]  03/19/2020	03/26/2020	xtampza er 9 mg capsule	60	30	DangeloRolan	[facility]  02/27/2020	02/28/2020	xtampza er 9 mg capsule	60	30	DangeloRolan gardner	[facility].  01/16/2020	01/17/2020	oxycodone hcl er 10 mg tablet	90	30	Nallely Wolf	Replaced by [facility].  * - Drugs marked with an asterisk are compound drugs. If the compound drug is made up of more than one controlled substance, then each controlled substance will be a separate row in the table.

## 2021-01-02 NOTE — PHYSICAL THERAPY INITIAL EVALUATION ADULT - ADDITIONAL COMMENTS
As per the pt/CM note, pt lives alone in an appt, 2-3STE, PTA, pt was I in functional ambulation and ADLs using RW.

## 2021-01-02 NOTE — H&P ADULT - ATTENDING COMMENTS
Pt with cellulitis of RLE, will also add cefepime in addition to above given appearance and DM2. Case d/w resident physician

## 2021-01-02 NOTE — H&P ADULT - PROBLEM SELECTOR PLAN 10
Transitions of Care Status:  1.  Name of PCP: Dr. Dawkins  2.  PCP Contacted on Admission: [ ] Y    [ ] N    3.  PCP contacted at Discharge: [ ] Y    [ ] N    [ ] N/A  4.  Post-Discharge Appointment Date and Location:  5.  Summary of Handoff given to PCP:

## 2021-01-02 NOTE — H&P ADULT - PROBLEM SELECTOR PLAN 8
DVT ppx: heparin subq 5000 TID  Diet: CC diet  Dispo: pending clinical improvement and PT eval  Code: full code - Medication list unable to be verified with patient or daughter. Will continue currently listed meds for now except coumadin and amlodipine. Call pharmacy for med rec.   - Patient unclear if she is taking Eliquis or any blood thinners  - Listed penicillin allergy in charts - unclear reaction, patient says the allergy has "resolved" and that her reaction was from 40+ years ago. Patient does not remember what happens when she receives penicillins. Will continue abx regimen with vancomycin for now pending verification.

## 2021-01-02 NOTE — H&P ADULT - ASSESSMENT
80 year old female with CHF, CAD s/p CABG, breast cancer s/p mastectomy, CRC s/p radiation and resection, DVT on Eliquis, HTN, HLD, and T2DM presenting with RLE pain and redness. Pain primarily in R hip, likely from severe AOA, and R lower leg, likely from cellulitis. Patient admitted for further management of RLE cellulitis.

## 2021-01-02 NOTE — H&P ADULT - NSHPPHYSICALEXAM_GEN_ALL_CORE
PHYSICAL EXAM:  Vital Signs Last 24 Hrs  T(C): 36.6 (01 Jan 2021 19:25), Max: 37.1 (01 Jan 2021 18:08)  T(F): 97.9 (01 Jan 2021 19:25), Max: 98.7 (01 Jan 2021 18:08)  HR: 60 (01 Jan 2021 23:03) (18 - 68)  BP: 133/46 (01 Jan 2021 23:03) (124/55 - 140/64)  BP(mean): 71 (01 Jan 2021 23:03) (71 - 71)  ABP: --  ABP(mean): --  RR: 18 (01 Jan 2021 23:03) (18 - 18)  SpO2: 99% (01 Jan 2021 23:03) (97% - 99%)      GENERAL: NAD, well-groomed, well-developed  HEAD:  Atraumatic, Normocephalic  EYES: EOMI, PERRLA, conjunctiva and sclera clear  ENMT: No tonsillar erythema, exudates, or enlargement; Moist mucous membranes, Good dentition, No lesions  NECK: Supple, No JVD, Normal thyroid  CHEST/LUNG: Clear to percussion bilaterally; No rales, rhonchi, wheezing, or rubs  HEART: Regular rate and rhythm; No murmurs, rubs, or gallops  ABDOMEN: Soft, Nontender, Nondistended; Bowel sounds present  VASCULAR:  2+ Peripheral Pulses, No clubbing, cyanosis, or edema  LYMPH: No lymphadenopathy noted  SKIN: No rashes or lesions  NERVOUS SYSTEM:  Alert & Oriented X3, Good concentration; Motor Strength 5/5 B/L upper and lower extremities; DTRs 2+ intact and symmetric PHYSICAL EXAM:  Vital Signs Last 24 Hrs  T(C): 36.6 (01 Jan 2021 19:25), Max: 37.1 (01 Jan 2021 18:08)  T(F): 97.9 (01 Jan 2021 19:25), Max: 98.7 (01 Jan 2021 18:08)  HR: 60 (01 Jan 2021 23:03) (18 - 68)  BP: 133/46 (01 Jan 2021 23:03) (124/55 - 140/64)  BP(mean): 71 (01 Jan 2021 23:03) (71 - 71)  ABP: --  ABP(mean): --  RR: 18 (01 Jan 2021 23:03) (18 - 18)  SpO2: 99% (01 Jan 2021 23:03) (97% - 99%)      GENERAL: NAD, well-groomed, well-developed  HEAD:  Atraumatic, Normocephalic  EYES: EOMI, PERRLA, conjunctiva and sclera clear  ENMT: No tonsillar erythema, exudates, or enlargement; Moist mucous membranes.  NECK: Supple, No JVD, Normal thyroid  CHEST/LUNG: Clear to percussion bilaterally; No rales, rhonchi, wheezing, or rubs  HEART: Regular rate and rhythm; No murmurs, rubs, or gallops  ABDOMEN: Soft, Nontender, Nondistended; Bowel sounds present  VASCULAR:  2+ Peripheral Pulses, No clubbing, cyanosis, or edema  LYMPH: No lymphadenopathy noted  SKIN: +erythema and warmth over R anterior lower extremity, hyperpigmentation due to chronic venous stasis, scabbed wound below R knee, medially.  NERVOUS SYSTEM:  Alert & Oriented X3, Good concentration; Motor Strength 5/5 B/L upper and lower extremities

## 2021-01-02 NOTE — H&P ADULT - PROBLEM SELECTOR PLAN 1
Erythema and warmth of RLE, no blisters, no discharge or fluctuance. US doppler of RLE neg for DVT. Patient with no fevers or elevated WBC. Ddx: cellulitis vs stasis dermatitis.  - Patient s/p vanc 1250mg in ED  - Area marked, monitor for spread  - c/w vancomycin for now  - Consider ID consult Erythema and warmth of RLE, no blisters, no discharge or fluctuance. US doppler of RLE neg for DVT. Patient with no fevers or elevated WBC. Ddx: cellulitis vs stasis dermatitis.  - Patient s/p vanc 1250mg in ED  - Area marked, monitor for spread  - c/w vancomycin 1250mg for now

## 2021-01-02 NOTE — H&P ADULT - PROBLEM SELECTOR PLAN 9
Transitions of Care Status:  1.  Name of PCP: Dr. Dawkins  2.  PCP Contacted on Admission: [ ] Y    [ ] N    3.  PCP contacted at Discharge: [ ] Y    [ ] N    [ ] N/A  4.  Post-Discharge Appointment Date and Location:  5.  Summary of Handoff given to PCP: DVT ppx: heparin subq 5000 TID  Diet: CC diet  Dispo: pending clinical improvement and PT eval  Code: full code

## 2021-01-02 NOTE — PROGRESS NOTE ADULT - PROBLEM SELECTOR PLAN 3
Moderately elevated creatinine 1.47.   - Avoid nephrotoxics  - Maintain adequate hydration Moderately elevated creatinine 1.47; appears baseline fluctuates between 1.4-2.1 dating back to 2015, likely has CKD stage 4 given eGFR 20s   - Avoid nephrotoxics  - Maintain adequate hydration  - Renally dose abx

## 2021-01-02 NOTE — H&P ADULT - NSHPSOCIALHISTORY_GEN_ALL_CORE
Social History:    Marital Status:  (   )    (   ) Single    (   )    (  )   Occupation: Not working  Lives with: ( X ) alone  (  ) children   (  ) spouse   (  ) parents  (  ) other    Substance Use (street drugs): ( X ) never used  (  ) other:  Tobacco Usage:  (   ) never smoked   (   ) former smoker   (   ) current smoker  (     ) pack year  (        ) last cigarette date  Alcohol Usage: Social History:  Lives with: ( X ) alone  (  ) children   (  ) spouse   (  ) parents  (  ) other  Substance Use (street drugs): ( X ) never used  (  ) other:  Tobacco Usage:  ( X  ) never smoked   (   ) former smoker   (   ) current smoker  (     ) pack year  (        ) last cigarette date  Alcohol Usage: None

## 2021-01-02 NOTE — H&P ADULT - NSHPREVIEWOFSYSTEMS_GEN_ALL_CORE
REVIEW OF SYSTEMS:  CONSTITUTIONAL: No fever, chills, night sweats, or fatigue  EYES: No eye pain, visual disturbances, or discharge  ENMT:  No difficulty hearing, tinnitus, vertigo; No sinus or throat pain  NECK: No pain or stiffness  BREASTS: No pain, masses, or nipple discharge  RESPIRATORY: No cough, wheezing, or hemoptysis; No shortness of breath  CARDIOVASCULAR: No chest pain, palpitations, dizziness, or leg swelling  GASTROINTESTINAL: No abdominal or epigastric pain. No nausea, vomiting, or hematemesis; No diarrhea or constipation. No melena or hematochezia.  GENITOURINARY: No dysuria, frequency, hematuria, or incontinence  NEUROLOGICAL: No headaches, memory loss, loss of strength, numbness, or tremors  SKIN: No itching, burning, rashes, or lesions   LYMPH NODES: No enlarged glands  ENDOCRINE: No heat or cold intolerance; No hair loss  MUSCULOSKELETAL: No joint pain or swelling; No muscle, back, or extremity pain  PSYCHIATRIC: No depression, anxiety, mood swings, or difficulty sleeping  HEME/LYMPH: No easy bruising, or bleeding gums  ALLERY AND IMMUNOLOGIC: No hives or eczema REVIEW OF SYSTEMS:  CONSTITUTIONAL: No fever, chills, night sweats, or fatigue  EYES: No eye pain, visual disturbances, or discharge  ENMT:  No difficulty hearing, tinnitus, vertigo; No sinus or throat pain  NECK: No pain or stiffness  RESPIRATORY: No cough, wheezing, or hemoptysis; No shortness of breath  CARDIOVASCULAR: No chest pain, palpitations, dizziness  GASTROINTESTINAL: No abdominal or epigastric pain. No nausea, vomiting, or hematemesis; No diarrhea or constipation. No melena or hematochezia.  GENITOURINARY: No dysuria, frequency, hematuria, or incontinence  NEUROLOGICAL: No headaches, memory loss, loss of strength, numbness, or tremors  SKIN: +Redness in RLE with blistering, No itching, burning, rashes, or lesions   MUSCULOSKELETAL: + R hip pain, +back pain, No joint pain or swelling; No muscle pain  HEME/LYMPH: No easy bruising, or bleeding gums

## 2021-01-02 NOTE — PROGRESS NOTE ADULT - SUBJECTIVE AND OBJECTIVE BOX
PROGRESS NOTE:   Authored by Dr. Sonya Carlin MD (PGY-1). Pager [phone]    Patient is a 80y old  Female who presents with a chief complaint of Cellulitis (2021 01:55)      SUBJECTIVE / OVERNIGHT EVENTS:  No acute events overnight. Patient denies fevers, chills, chest pain, SOB, n/v.      ADDITIONAL REVIEW OF SYSTEMS:    MEDICATIONS  (STANDING):  aspirin enteric coated 81 milliGRAM(s) Oral daily  atorvastatin 40 milliGRAM(s) Oral at bedtime  dextrose 40% Gel 15 Gram(s) Oral once  dextrose 5%. 1000 milliLiter(s) (50 mL/Hr) IV Continuous <Continuous>  dextrose 5%. 1000 milliLiter(s) (100 mL/Hr) IV Continuous <Continuous>  dextrose 50% Injectable 25 Gram(s) IV Push once  dextrose 50% Injectable 12.5 Gram(s) IV Push once  dextrose 50% Injectable 25 Gram(s) IV Push once  glucagon  Injectable 1 milliGRAM(s) IntraMuscular once  heparin   Injectable 5000 Unit(s) SubCutaneous every 8 hours  insulin glargine Injectable (LANTUS) 10 Unit(s) SubCutaneous every morning  insulin lispro (ADMELOG) corrective regimen sliding scale   SubCutaneous three times a day before meals  insulin lispro (ADMELOG) corrective regimen sliding scale   SubCutaneous at bedtime  metoprolol succinate  milliGRAM(s) Oral daily  vancomycin  IVPB. 1250 milliGRAM(s) IV Intermittent every 24 hours    MEDICATIONS  (PRN):  acetaminophen   Tablet .. 650 milliGRAM(s) Oral every 6 hours PRN Temp greater or equal to 38C (100.4F), Mild Pain (1 - 3)      CAPILLARY BLOOD GLUCOSE        I&O's Summary    2021 07:01  -  2021 06:21  --------------------------------------------------------  IN: 0 mL / OUT: 100 mL / NET: -100 mL        PHYSICAL EXAM:  Vital Signs Last 24 Hrs  T(C): 36.6 (2021 05:15), Max: 37.1 (2021 18:08)  T(F): 97.8 (2021 05:15), Max: 98.7 (2021 18:08)  HR: 72 (2021 05:15) (18 - 72)  BP: 148/69 (2021 05:15) (111/55 - 148/69)  BP(mean): 71 (2021 23:03) (71 - 71)  RR: 18 (2021 05:15) (18 - 18)  SpO2: 97% (2021 05:15) (95% - 99%)    CONSTITUTIONAL: NAD, well-developed  RESPIRATORY: Normal respiratory effort; lungs are clear to auscultation bilaterally  CARDIOVASCULAR: Regular rate and rhythm, normal S1 and S2, no murmur/rub/gallop; No lower extremity edema; Peripheral pulses are 2+ bilaterally  ABDOMEN: Nontender to palpation, normoactive bowel sounds, no rebound/guarding; No hepatosplenomegaly  MUSCLOSKELETAL: no clubbing or cyanosis of digits; no joint swelling or tenderness to palpation  PSYCH: A+O to person, place, and time; affect appropriate    LABS:                        9.7    7.70  )-----------( 333      ( 2021 19:15 )             32.8         140  |  105  |  32<H>  ----------------------------<  195<H>  4.4   |  23  |  1.47<H>    Ca    9.1      2021 19:15    TPro  6.8  /  Alb  3.4  /  TBili  0.1<L>  /  DBili  x   /  AST  16  /  ALT  8<L>  /  AlkPhos  86      PT/INR - ( 2021 19:15 )   PT: 12.5 sec;   INR: 1.04 ratio         PTT - ( 2021 19:15 )  PTT:32.2 sec      Urinalysis Basic - ( 2021 21:47 )    Color: Light Yellow / Appearance: Clear / S.025 / pH: x  Gluc: x / Ketone: Negative  / Bili: Negative / Urobili: Negative   Blood: x / Protein: 30 mg/dL / Nitrite: Negative   Leuk Esterase: Negative / RBC: 5 /hpf / WBC 7 /HPF   Sq Epi: x / Non Sq Epi: 6 /hpf / Bacteria: Negative          Tele Reviewed:    RADIOLOGY & ADDITIONAL TESTS:  Results Reviewed:   Imaging Personally Reviewed:  Electrocardiogram Personally Reviewed:     PROGRESS NOTE:   Authored by Dr. Sonya Carlin MD (PGY-1). Pager [phone]    Patient is a 80y old  Female who presents with a chief complaint of Cellulitis (2021 01:55)      SUBJECTIVE / OVERNIGHT EVENTS:  No acute events overnight.      # [id number].     Patient states she feels as if she is filled with water in her entire body, denies SOB, able to breathe while laying flat. States her RLE swelling and redness began 3 days ago, and has not noticed a chance in the last 3 days. She also noticed her RUE has been more swollen than usual, and added that RUE has always been larger than right after mastectomy.     ADDITIONAL REVIEW OF SYSTEMS:  Patient denies fevers, chills, chest pain, SOB, n/v.    MEDICATIONS  (STANDING):  aspirin enteric coated 81 milliGRAM(s) Oral daily  atorvastatin 40 milliGRAM(s) Oral at bedtime  dextrose 40% Gel 15 Gram(s) Oral once  dextrose 5%. 1000 milliLiter(s) (50 mL/Hr) IV Continuous <Continuous>  dextrose 5%. 1000 milliLiter(s) (100 mL/Hr) IV Continuous <Continuous>  dextrose 50% Injectable 25 Gram(s) IV Push once  dextrose 50% Injectable 12.5 Gram(s) IV Push once  dextrose 50% Injectable 25 Gram(s) IV Push once  glucagon  Injectable 1 milliGRAM(s) IntraMuscular once  heparin   Injectable 5000 Unit(s) SubCutaneous every 8 hours  insulin glargine Injectable (LANTUS) 10 Unit(s) SubCutaneous every morning  insulin lispro (ADMELOG) corrective regimen sliding scale   SubCutaneous three times a day before meals  insulin lispro (ADMELOG) corrective regimen sliding scale   SubCutaneous at bedtime  metoprolol succinate  milliGRAM(s) Oral daily  vancomycin  IVPB. 1250 milliGRAM(s) IV Intermittent every 24 hours    MEDICATIONS  (PRN):  acetaminophen   Tablet .. 650 milliGRAM(s) Oral every 6 hours PRN Temp greater or equal to 38C (100.4F), Mild Pain (1 - 3)      CAPILLARY BLOOD GLUCOSE        I&O's Summary    2021 07:01  -  2021 06:21  --------------------------------------------------------  IN: 0 mL / OUT: 100 mL / NET: -100 mL        PHYSICAL EXAM:  Vital Signs Last 24 Hrs  T(C): 36.6 (2021 05:15), Max: 37.1 (2021 18:08)  T(F): 97.8 (2021 05:15), Max: 98.7 (2021 18:08)  HR: 72 (2021 05:15) (18 - 72)  BP: 148/69 (2021 05:15) (111/55 - 148/69)  BP(mean): 71 (2021 23:03) (71 - 71)  RR: 18 (2021 05:15) (18 - 18)  SpO2: 97% (2021 05:15) (95% - 99%)    CONSTITUTIONAL: NAD, well-developed, obese elderly female resting comfortably in bed with head of bed elevated to about 60 degrees   RESPIRATORY: Normal respiratory effort; lungs are clear to auscultation bilaterally, crackles in lower lung bases L more than R   CARDIOVASCULAR: Regular rate and rhythm, normal S1 and S2, no murmur/rub/gallop; No lower extremity edema; Peripheral pulses are 2+ bilaterally  ABDOMEN: Nontender to palpation, normoactive bowel sounds, no rebound/guarding  MUSCLOSKELETAL: no joint swelling or tenderness to palpation  PSYCH: A+O to person, place, and time; affect appropriate    LABS:                        9.7    7.70  )-----------( 333      ( 2021 19:15 )             32.8         140  |  105  |  32<H>  ----------------------------<  195<H>  4.4   |  23  |  1.47<H>    Ca    9.1      2021 19:15    TPro  6.8  /  Alb  3.4  /  TBili  0.1<L>  /  DBili  x   /  AST  16  /  ALT  8<L>  /  AlkPhos  86      PT/INR - ( 2021 19:15 )   PT: 12.5 sec;   INR: 1.04 ratio         PTT - ( 2021 19:15 )  PTT:32.2 sec      Urinalysis Basic - ( 2021 21:47 )    Color: Light Yellow / Appearance: Clear / S.025 / pH: x  Gluc: x / Ketone: Negative  / Bili: Negative / Urobili: Negative   Blood: x / Protein: 30 mg/dL / Nitrite: Negative   Leuk Esterase: Negative / RBC: 5 /hpf / WBC 7 /HPF   Sq Epi: x / Non Sq Epi: 6 /hpf / Bacteria: Negative

## 2021-01-02 NOTE — PHYSICAL THERAPY INITIAL EVALUATION ADULT - PERTINENT HX OF CURRENT PROBLEM, REHAB EVAL
80 year old female with CHF, CAD s/p CABG, breast cancer s/p mastectomy, CRC s/p radiation and resection, DVT on Eliquis, HTN, HLD, and T2DM presenting with RLE pain and redness. Pain primarily in R hip, likely from severe AOA, and R lower leg, likely from cellulitis. Patient admitted for further management of RLE cellulitis. Lab shows dec h/h,  Xray/CT hip: Severe right hip osteoarthrosis,  -ve fx or dislocation.

## 2021-01-03 ENCOUNTER — TRANSCRIPTION ENCOUNTER (OUTPATIENT)
Age: 81
End: 2021-01-03

## 2021-01-03 LAB
ANION GAP SERPL CALC-SCNC: 12 MMOL/L — SIGNIFICANT CHANGE UP (ref 5–17)
BUN SERPL-MCNC: 22 MG/DL — SIGNIFICANT CHANGE UP (ref 7–23)
CALCIUM SERPL-MCNC: 9.8 MG/DL — SIGNIFICANT CHANGE UP (ref 8.4–10.5)
CHLORIDE SERPL-SCNC: 101 MMOL/L — SIGNIFICANT CHANGE UP (ref 96–108)
CO2 SERPL-SCNC: 26 MMOL/L — SIGNIFICANT CHANGE UP (ref 22–31)
CREAT SERPL-MCNC: 1.4 MG/DL — HIGH (ref 0.5–1.3)
CULTURE RESULTS: SIGNIFICANT CHANGE UP
GLUCOSE BLDC GLUCOMTR-MCNC: 128 MG/DL — HIGH (ref 70–99)
GLUCOSE BLDC GLUCOMTR-MCNC: 138 MG/DL — HIGH (ref 70–99)
GLUCOSE BLDC GLUCOMTR-MCNC: 160 MG/DL — HIGH (ref 70–99)
GLUCOSE BLDC GLUCOMTR-MCNC: 162 MG/DL — HIGH (ref 70–99)
GLUCOSE SERPL-MCNC: 134 MG/DL — HIGH (ref 70–99)
HCT VFR BLD CALC: 35.6 % — SIGNIFICANT CHANGE UP (ref 34.5–45)
HGB BLD-MCNC: 10.5 G/DL — LOW (ref 11.5–15.5)
MAGNESIUM SERPL-MCNC: 2 MG/DL — SIGNIFICANT CHANGE UP (ref 1.6–2.6)
MCHC RBC-ENTMCNC: 26.4 PG — LOW (ref 27–34)
MCHC RBC-ENTMCNC: 29.5 GM/DL — LOW (ref 32–36)
MCV RBC AUTO: 89.7 FL — SIGNIFICANT CHANGE UP (ref 80–100)
NRBC # BLD: 0 /100 WBCS — SIGNIFICANT CHANGE UP (ref 0–0)
PHOSPHATE SERPL-MCNC: 3 MG/DL — SIGNIFICANT CHANGE UP (ref 2.5–4.5)
PLATELET # BLD AUTO: 341 K/UL — SIGNIFICANT CHANGE UP (ref 150–400)
POTASSIUM SERPL-MCNC: 3.8 MMOL/L — SIGNIFICANT CHANGE UP (ref 3.5–5.3)
POTASSIUM SERPL-SCNC: 3.8 MMOL/L — SIGNIFICANT CHANGE UP (ref 3.5–5.3)
RBC # BLD: 3.97 M/UL — SIGNIFICANT CHANGE UP (ref 3.8–5.2)
RBC # FLD: 15.3 % — HIGH (ref 10.3–14.5)
SODIUM SERPL-SCNC: 139 MMOL/L — SIGNIFICANT CHANGE UP (ref 135–145)
SPECIMEN SOURCE: SIGNIFICANT CHANGE UP
WBC # BLD: 7.93 K/UL — SIGNIFICANT CHANGE UP (ref 3.8–10.5)
WBC # FLD AUTO: 7.93 K/UL — SIGNIFICANT CHANGE UP (ref 3.8–10.5)

## 2021-01-03 PROCEDURE — 99222 1ST HOSP IP/OBS MODERATE 55: CPT | Mod: GC

## 2021-01-03 PROCEDURE — 73564 X-RAY EXAM KNEE 4 OR MORE: CPT | Mod: 26,RT

## 2021-01-03 PROCEDURE — 99233 SBSQ HOSP IP/OBS HIGH 50: CPT | Mod: GC

## 2021-01-03 RX ORDER — METOPROLOL TARTRATE 50 MG
150 TABLET ORAL DAILY
Refills: 0 | Status: DISCONTINUED | OUTPATIENT
Start: 2021-01-03 | End: 2021-01-04

## 2021-01-03 RX ADMIN — APIXABAN 2.5 MILLIGRAM(S): 2.5 TABLET, FILM COATED ORAL at 05:26

## 2021-01-03 RX ADMIN — Medication 650 MILLIGRAM(S): at 22:16

## 2021-01-03 RX ADMIN — ATORVASTATIN CALCIUM 40 MILLIGRAM(S): 80 TABLET, FILM COATED ORAL at 22:16

## 2021-01-03 RX ADMIN — Medication 650 MILLIGRAM(S): at 04:55

## 2021-01-03 RX ADMIN — Medication 100 MILLIGRAM(S): at 05:26

## 2021-01-03 RX ADMIN — Medication 650 MILLIGRAM(S): at 23:15

## 2021-01-03 RX ADMIN — Medication 1: at 12:30

## 2021-01-03 RX ADMIN — Medication 650 MILLIGRAM(S): at 05:25

## 2021-01-03 RX ADMIN — Medication 650 MILLIGRAM(S): at 13:37

## 2021-01-03 RX ADMIN — Medication 650 MILLIGRAM(S): at 13:07

## 2021-01-03 RX ADMIN — APIXABAN 2.5 MILLIGRAM(S): 2.5 TABLET, FILM COATED ORAL at 17:29

## 2021-01-03 RX ADMIN — Medication 81 MILLIGRAM(S): at 12:30

## 2021-01-03 RX ADMIN — INSULIN GLARGINE 10 UNIT(S): 100 INJECTION, SOLUTION SUBCUTANEOUS at 08:29

## 2021-01-03 NOTE — PROVIDER CONTACT NOTE (OTHER) - SITUATION
According to Tele tech, Pt had 2.45 seconds of PAT,  & 7 beats of AIVR
Pt had PAF 8 secs with .
Pt had PAT 2.2 sec with 
Pt had PAT 23 secs with

## 2021-01-03 NOTE — PROVIDER CONTACT NOTE (OTHER) - ACTION/TREATMENT ORDERED:
Continue to monitor.
Will continue to monitor.
Continue to monitor
Team 5 aware. Continue to monitor.

## 2021-01-03 NOTE — DISCHARGE NOTE PROVIDER - CARE PROVIDERS DIRECT ADDRESSES
[email or web address]

## 2021-01-03 NOTE — CONSULT NOTE ADULT - SUBJECTIVE AND OBJECTIVE BOX
Patient is a 80y old  Female who presents with a chief complaint of Cellulitis (2021 05:57)    HPI:    : [id number]  79 y/o F PMHx of CHF, previous breast, colon, and rectal ca, chronic RUE lymphedema presented with RLE pain and redness. Patient known to have chronic right hip degeneration warranting hip replacement however deferred due to high cardiac risk per chart. Pt states RLE has been swollen and red for weeks. States she was treated in OSH, cannot say which.    In the hospital pt is afebrile without leukocytosis. CXR, Duplex RLE negative/  Received vancomycin and cefepime in ER and had area of RLE marked off to represent cellulitis.    Pt states RLE has improved, swelling and redness have resolved. She denies ever having had fever, chills, cough, SOB, abdominal pain, diarrhea, dysuria. Her main complaint is hip and knee pain. Bcx negative x 2 sets.     prior hospital charts reviewed [ x ]  primary team notes reviewed [ x ]  other consultant notes reviewed [ x ]  PAST MEDICAL & SURGICAL HISTORY:  Renal insufficiency    DVT of leg (deep venous thrombosis)  right calf DVT  2019 pt on Eliquis    CHF (congestive heart failure)    Type II diabetes mellitus    Obese    Rectal cancer  s/p chemotherapy and  radiation 12 weeks 2015 -3/2015    Hyperlipidemia    Lymphedema of arm  right arm s/p mastectomy    HTN (hypertension)    Breast CA, right   s/p mastectomy ,IV Chemotherapy    S/P TKR (total knee replacement), right  2017    S/P colon resection      S/P ileostomy  low anterior resection-8/10/15, reversal of ileostomy 2016    History of appendectomy      S/P mastectomy, right        Allergies  CT scan dye (Hives)  penicillin (Unknown) - Pt states she had facial swelling many years ago, cannot say how long ago, but also states she has had PCN since then without issue    ANTIMICROBIALS (past 90 days)  MEDICATIONS  (STANDING):    cefepime   IVPB   100 mL/Hr IV Intermittent (21 @ 09:35)    vancomycin  IVPB.   250 mL/Hr IV Intermittent (21 @ 21:50)   250 mL/Hr IV Intermittent (21 @ 21:47)      ANTIMICROBIALS:    vancomycin  IVPB. 1250 every 24 hours    OTHER MEDS: MEDICATIONS  (STANDING):  acetaminophen   Tablet .. 650 every 6 hours PRN  apixaban 2.5 two times a day  aspirin enteric coated 81 daily  atorvastatin 40 at bedtime  dextrose 40% Gel 15 once  dextrose 50% Injectable 25 once  dextrose 50% Injectable 12.5 once  dextrose 50% Injectable 25 once  glucagon  Injectable 1 once  insulin glargine Injectable (LANTUS) 10 every morning  insulin lispro (ADMELOG) corrective regimen sliding scale  three times a day before meals  insulin lispro (ADMELOG) corrective regimen sliding scale  at bedtime  metoprolol succinate  daily  oxyCODONE    IR 5 every 6 hours PRN  polyethylene glycol 3350 17 daily  senna 1 daily PRN    SOCIAL HISTORY:   non-smoker, no alcohol or drug use, from [place]    FAMILY HISTORY:  Family history of heart attack (Child)    Family history of diabetes mellitus in son    Family history of diabetes mellitus in mother      REVIEW OF SYSTEMS  [  ] ROS unobtainable because:    [ x ] All other systems negative except as noted below:	    Constitutional:  [ ] fever [ ] chills  [ ] weight loss  [ ] weakness  Skin:  [ ] rash [ ] phlebitis	  Eyes: [ ] icterus [ ] pain  [ ] discharge	  ENMT: [ ] sore throat  [ ] thrush [ ] ulcers [ ] exudates  Respiratory: [ ] dyspnea [ ] hemoptysis [ ] cough [ ] sputum	  Cardiovascular:  [ ] chest pain [ ] palpitations [ ] edema	  Gastrointestinal:  [ ] nausea [ ] vomiting [ ] diarrhea [ ] constipation [ ] pain	  Genitourinary:  [ ] dysuria [ ] frequency [ ] hematuria [ ] discharge [ ] flank pain  [ ] incontinence  Musculoskeletal:  [ ] myalgias [ ] arthralgias [ ] arthritis  [ ] back pain  Neurological:  [ ] headache [ ] seizures  [ ] confusion/altered mental status  Psychiatric:  [ ] anxiety [ ] depression	  Hematology/Lymphatics:  [ ] lymphadenopathy  Endocrine:  [ ] adrenal [ ] thyroid  Allergic/Immunologic:	 [ ] transplant [ ] seasonal    Vital Signs Last 24 Hrs  T(F): 98.1 (21 @ 12:52), Max: 99 (21 @ 03:45)  Vital Signs Last 24 Hrs  HR: 70 (21 @ 12:52) (70 - 83)  BP: 157/78 (21 @ 12:52) (144/86 - 165/72)  RR: 18 (21 @ 12:52)  SpO2: 98% (21 @ 12:52) (94% - 98%)  Wt(kg): --    PHYSICAL EXAM:  Constitutional: non-toxic, no distress, obese  HEAD/EYES: anicteric, no conjunctival injection  ENT:  supple, no thrush  Cardiovascular:   normal S1, S2, no murmur, no edema  Respiratory:  clear BS bilaterally, no wheezes, no rales  GI:  soft, non-tender, normal bowel sounds  :  no chen, no CVA tenderness  Musculoskeletal:  RUE lymphedema, b/l LEs minimal pitting edema  Neurologic: awake and alert, normal strength, no focal findings  Skin:  chronic venous stasis changes, legs are cool/normothermic without erythema, fluctuance  Heme/Onc: no lymphadenopathy   Psychiatric:  awake, alert, appropriate mood                            10.5   7.93  )-----------( 341      ( 2021 07:16 )             35.6     01-    139  |  101  |  22  ----------------------------<  134<H>  3.8   |  26  |  1.40<H>    Ca    9.8      2021 07:16  Phos  3.0     -  Mg     2.0     -03    TPro  6.8  /  Alb  3.4  /  TBili  0.1<L>  /  DBili  x   /  AST  16  /  ALT  8<L>  /  AlkPhos  86  -    Urinalysis Basic - ( 2021 21:47 )    Color: Light Yellow / Appearance: Clear / S.025 / pH: x  Gluc: x / Ketone: Negative  / Bili: Negative / Urobili: Negative   Blood: x / Protein: 30 mg/dL / Nitrite: Negative   Leuk Esterase: Negative / RBC: 5 /hpf / WBC 7 /HPF   Sq Epi: x / Non Sq Epi: 6 /hpf / Bacteria: Negative    MICROBIOLOGY:  Culture - Blood (collected 2021 00:56)  Source: .Blood Blood-Peripheral  Preliminary Report (2021 01:02):    No growth to date.    Culture - Blood (collected 2021 00:56)  Source: .Blood Blood-Peripheral  Preliminary Report (2021 01:02):    No growth to date.    Culture - Urine (collected 2021 00:43)  Source: .Urine Clean Catch (Midstream)  Final Report (2021 07:22):    >=3 organisms. Probable collection contamination.                  RADIOLOGY:  < from: [facility] Duplex Lower Ext Vein Scan, Left (21 @ 10:54) >  IMPRESSION:    No evidence of left lower extremity deep venous thrombosis.      r< from: [facility] Duplex Lower Ext Vein Scan, Right (21 @ 20:02) >  IMPRESSION:  No evidence of right lower extremity deep venous thrombosis.        < end of copied text >  < from: Xray Chest 1 View AP/PA (21 @ 19:24) >    IMPRESSION:    Clear lungs.    < end of copied text >         Patient is a 80y old  Female who presents with a chief complaint of Cellulitis (2021 05:57)    HPI:    : [id number]  81 y/o F PMHx of CHF, previous breast, colon, and rectal ca, chronic RUE lymphedema presented with RLE pain and redness. Patient known to have chronic right hip degeneration warranting hip replacement however deferred due to high cardiac risk per chart. Pt states RLE has been swollen and red for weeks. States she was treated in OSH, cannot say which.    In the hospital pt is afebrile without leukocytosis. CXR, Duplex RLE negative/  Received vancomycin and cefepime in ER and had area of RLE marked off to represent cellulitis.    Pt states RLE has improved, swelling and redness have resolved. She denies ever having had fever, chills, cough, SOB, abdominal pain, diarrhea, dysuria. Her main complaint is hip and knee pain. Bcx negative x 2 sets.     prior hospital charts reviewed [ x ]  primary team notes reviewed [ x ]  other consultant notes reviewed [ x ]  PAST MEDICAL & SURGICAL HISTORY:  Renal insufficiency    DVT of leg (deep venous thrombosis)  right calf DVT  2019 pt on Eliquis    CHF (congestive heart failure)    Type II diabetes mellitus    Obese    Rectal cancer  s/p chemotherapy and  radiation 12 weeks 2015 -3/2015    Hyperlipidemia    Lymphedema of arm  right arm s/p mastectomy    HTN (hypertension)    Breast CA, right   s/p mastectomy ,IV Chemotherapy    S/P TKR (total knee replacement), right  2017    S/P colon resection      S/P ileostomy  low anterior resection-8/10/15, reversal of ileostomy 2016    History of appendectomy      S/P mastectomy, right        Allergies  CT scan dye (Hives)  penicillin (Unknown) - Pt states she had facial swelling many years ago, cannot say how long ago, but also states she has had PCN since then without issue    ANTIMICROBIALS (past 90 days)  MEDICATIONS  (STANDING):    cefepime   IVPB   100 mL/Hr IV Intermittent (21 @ 09:35)    vancomycin  IVPB.   250 mL/Hr IV Intermittent (21 @ 21:50)   250 mL/Hr IV Intermittent (21 @ 21:47)      ANTIMICROBIALS:    vancomycin  IVPB. 1250 every 24 hours    OTHER MEDS: MEDICATIONS  (STANDING):  acetaminophen   Tablet .. 650 every 6 hours PRN  apixaban 2.5 two times a day  aspirin enteric coated 81 daily  atorvastatin 40 at bedtime  dextrose 40% Gel 15 once  dextrose 50% Injectable 25 once  dextrose 50% Injectable 12.5 once  dextrose 50% Injectable 25 once  glucagon  Injectable 1 once  insulin glargine Injectable (LANTUS) 10 every morning  insulin lispro (ADMELOG) corrective regimen sliding scale  three times a day before meals  insulin lispro (ADMELOG) corrective regimen sliding scale  at bedtime  metoprolol succinate  daily  oxyCODONE    IR 5 every 6 hours PRN  polyethylene glycol 3350 17 daily  senna 1 daily PRN    SOCIAL HISTORY:   non-smoker, no alcohol or drug use, from [place]    FAMILY HISTORY:  Family history of heart attack (Child)    Family history of diabetes mellitus in son    Family history of diabetes mellitus in mother      REVIEW OF SYSTEMS  [  ] ROS unobtainable because:    [ x ] All other systems negative except as noted below:	    Constitutional:  [ ] fever [ ] chills  [ ] weight loss  [ ] weakness  Skin:  [ ] rash [ ] phlebitis	  Eyes: [ ] icterus [ ] pain  [ ] discharge	  ENMT: [ ] sore throat  [ ] thrush [ ] ulcers [ ] exudates  Respiratory: [ ] dyspnea [ ] hemoptysis [ ] cough [ ] sputum	  Cardiovascular:  [ ] chest pain [ ] palpitations [ ] edema	  Gastrointestinal:  [ ] nausea [ ] vomiting [ ] diarrhea [ ] constipation [ ] pain	  Genitourinary:  [ ] dysuria [ ] frequency [ ] hematuria [ ] discharge [ ] flank pain  [ ] incontinence  Musculoskeletal:  [ ] myalgias [ ] arthralgias [ ] arthritis  [ ] back pain  Neurological:  [ ] headache [ ] seizures  [ ] confusion/altered mental status  Psychiatric:  [ ] anxiety [ ] depression	  Endocrine:  [ ] adrenal [ ] thyroid  Allergic/Immunologic:	 [ ] transplant [ ] seasonal      Vital Signs Last 24 Hrs  T(F): 98.1 (21 @ 12:52), Max: 99 (21 @ 03:45)  Vital Signs Last 24 Hrs  HR: 70 (21 @ 12:52) (70 - 83)  BP: 157/78 (21 @ 12:52) (144/86 - 165/72)  RR: 18 (21 @ 12:52)  SpO2: 98% (01-03-21 @ 12:52) (94% - 98%)  Wt(kg): --    PHYSICAL EXAM:  Constitutional: non-toxic, no distress, obese  HEAD/EYES: anicteric, no conjunctival injection  ENT:  supple, no thrush  Cardiovascular:   normal S1, S2,   Respiratory:  clear BS bilaterally,   GI:  soft, non-tender, normal bowel sounds  :  no chen, no CVA tenderness  Musculoskeletal: TKR rt, no joint swelling   Neurologic: awake and alert, normal strength, no focal findings  Skin:  chronic venous stasis changes, legs are cool/normothermic without erythema, fluctuance  Extremities: edema rt LE, rt UE   Vascular: palpable pulses   Psychiatric:  awake, alert, appropriate mood                            10.5   7.93  )-----------( 341      ( 2021 07:16 )             35.6     01-03    139  |  101  |  22  ----------------------------<  134<H>  3.8   |  26  |  1.40<H>    Ca    9.8      2021 07:16  Phos  3.0     01-03  Mg     2.0     -03    TPro  6.8  /  Alb  3.4  /  TBili  0.1<L>  /  DBili  x   /  AST  16  /  ALT  8<L>  /  AlkPhos  86  -    Urinalysis Basic - ( 2021 21:47 )    Color: Light Yellow / Appearance: Clear / S.025 / pH: x  Gluc: x / Ketone: Negative  / Bili: Negative / Urobili: Negative   Blood: x / Protein: 30 mg/dL / Nitrite: Negative   Leuk Esterase: Negative / RBC: 5 /hpf / WBC 7 /HPF   Sq Epi: x / Non Sq Epi: 6 /hpf / Bacteria: Negative    MICROBIOLOGY:  Culture - Blood (collected 2021 00:56)  Source: .Blood Blood-Peripheral  Preliminary Report (2021 01:02):    No growth to date.    Culture - Blood (collected 2021 00:56)  Source: .Blood Blood-Peripheral  Preliminary Report (2021 01:02):    No growth to date.    Culture - Urine (collected 2021 00:43)  Source: .Urine Clean Catch (Midstream)  Final Report (2021 07:22):    >=3 organisms. Probable collection contamination.      RADIOLOGY:  < from: VA Duplex Lower Ext Vein Scan, Left (21 @ 10:54) >  IMPRESSION:    No evidence of left lower extremity deep venous thrombosis.      < from: VA Duplex Lower Ext Vein Scan, Right (21 @ 20:02) >  IMPRESSION:  No evidence of right lower extremity deep venous thrombosis.      Imaging reviewed personally and interpretation as mentioned below.     < from: Xray Chest 1 View AP/PA (21 @ 19:24) >    IMPRESSION:    Clear lungs.

## 2021-01-03 NOTE — DISCHARGE NOTE PROVIDER - PROVIDER TOKENS
PROVIDER:[TOKEN:[[id number]],SCHEDULEDAPPT:[01/19/2021]] PROVIDER:[TOKEN:[[id number]],SCHEDULEDAPPT:[01/19/2021],ESTABLISHEDPATIENT:[T]],PROVIDER:[TOKEN:[[id number]],FOLLOWUP:[1 week],ESTABLISHEDPATIENT:[T]]

## 2021-01-03 NOTE — PROGRESS NOTE ADULT - SUBJECTIVE AND OBJECTIVE BOX
Department of Internal Medicine  Kely Ibarra M.D. | PGY-3  Pager: [phone] (NS), [phone] ([facility])        Patient is a 80y old  Female who presents with a chief complaint of Cellulitis (2021 05:57)      SUBJECTIVE / OVERNIGHT EVENTS:  - Multiple runs of PAT and AIVR overnight on tele  - Reports RLE pain has improved and swelling  - Denies chest pain, shortness of breath, abdominal pain, fevers or chills  - Denies hip pain   ADDITIONAL REVIEW OF SYSTEMS:    MEDICATIONS  (STANDING):  apixaban 2.5 milliGRAM(s) Oral two times a day  aspirin enteric coated 81 milliGRAM(s) Oral daily  atorvastatin 40 milliGRAM(s) Oral at bedtime  dextrose 40% Gel 15 Gram(s) Oral once  dextrose 5%. 1000 milliLiter(s) (50 mL/Hr) IV Continuous <Continuous>  dextrose 5%. 1000 milliLiter(s) (100 mL/Hr) IV Continuous <Continuous>  dextrose 50% Injectable 25 Gram(s) IV Push once  dextrose 50% Injectable 12.5 Gram(s) IV Push once  dextrose 50% Injectable 25 Gram(s) IV Push once  glucagon  Injectable 1 milliGRAM(s) IntraMuscular once  insulin glargine Injectable (LANTUS) 10 Unit(s) SubCutaneous every morning  insulin lispro (ADMELOG) corrective regimen sliding scale   SubCutaneous three times a day before meals  insulin lispro (ADMELOG) corrective regimen sliding scale   SubCutaneous at bedtime  metoprolol succinate  milliGRAM(s) Oral daily  polyethylene glycol 3350 17 Gram(s) Oral daily  vancomycin  IVPB. 1250 milliGRAM(s) IV Intermittent every 24 hours    MEDICATIONS  (PRN):  acetaminophen   Tablet .. 650 milliGRAM(s) Oral every 6 hours PRN Temp greater or equal to 38C (100.4F), Mild Pain (1 - 3), Moderate Pain (4 - 6)  oxyCODONE    IR 5 milliGRAM(s) Oral every 6 hours PRN Severe Pain (7 - 10)  senna 1 Tablet(s) Oral daily PRN Constipation      CAPILLARY BLOOD GLUCOSE      POCT Blood Glucose.: 128 mg/dL (2021 08:22)  POCT Blood Glucose.: 129 mg/dL (2021 21:26)  POCT Blood Glucose.: 156 mg/dL (2021 17:11)  POCT Blood Glucose.: 174 mg/dL (2021 12:58)    I&O's Summary    2021 07:01  -  2021 07:00  --------------------------------------------------------  IN: 710 mL / OUT: 2025 mL / NET: -1315 mL        PHYSICAL EXAM:  Vital Signs Last 24 Hrs  T(C): 36.8 (2021 05:19), Max: 37.2 (2021 21:12)  T(F): 98.2 (2021 05:19), Max: 99 (2021 03:45)  HR: 78 (2021 05:19) (70 - 83)  BP: 151/85 (2021 05:19) (143/70 - 165/72)  BP(mean): --  RR: 18 (2021 05:19) (18 - 18)  SpO2: 97% (2021 05:19) (94% - 98%)      CONSTITUTIONAL: NAD, well-developed, obese elderly female resting comfortably in bed with head of bed elevated to about 60 degrees   RESPIRATORY: Normal respiratory effort; lungs are clear to auscultation bilaterally, crackles in lower lung bases L more than R   CARDIOVASCULAR: Regular rate and rhythm, normal S1 and S2, no murmur/rub/gallop;  Peripheral pulses are 2+ bilaterally  ABDOMEN: Nontender to palpation, normoactive bowel sounds, no rebound/guarding  MUSCLOSKELETAL: RLE swelling with some redness and warmth, based on demarcation receding inflammation   PSYCH: A+O to person, place, and time; affect appropriate    LABS:                        10.5   7.93  )-----------( 341      ( 2021 07:16 )             35.6     -03    139  |  101  |  22  ----------------------------<  134<H>  3.8   |  26  |  1.40<H>    Ca    9.8      2021 07:16  Phos  3.0     -  Mg     2.0     -    TPro  6.8  /  Alb  3.4  /  TBili  0.1<L>  /  DBili  x   /  AST  16  /  ALT  8<L>  /  AlkPhos  86      PT/INR - ( 2021 19:15 )   PT: 12.5 sec;   INR: 1.04 ratio         PTT - ( 2021 19:15 )  PTT:32.2 sec      Urinalysis Basic - ( 2021 21:47 )    Color: Light Yellow / Appearance: Clear / S.025 / pH: x  Gluc: x / Ketone: Negative  / Bili: Negative / Urobili: Negative   Blood: x / Protein: 30 mg/dL / Nitrite: Negative   Leuk Esterase: Negative / RBC: 5 /hpf / WBC 7 /HPF   Sq Epi: x / Non Sq Epi: 6 /hpf / Bacteria: Negative        Culture - Blood (collected 2021 00:56)  Source: .Blood Blood-Peripheral  Preliminary Report (2021 01:02):    No growth to date.    Culture - Blood (collected 2021 00:56)  Source: .Blood Blood-Peripheral  Preliminary Report (2021 01:02):    No growth to date.    Culture - Urine (collected 2021 00:43)  Source: .Urine Clean Catch (Midstream)  Final Report (2021 07:22):    >=3 organisms. Probable collection contamination.        RADIOLOGY & ADDITIONAL TESTS:  Results Reviewed:   Imaging Personally Reviewed:  Electrocardiogram Personally Reviewed:    COORDINATION OF CARE:  Care Discussed with Consultants/Other Providers [Y/N]:  Prior or Outpatient Records Reviewed [Y/N]:

## 2021-01-03 NOTE — PROGRESS NOTE ADULT - PROBLEM SELECTOR PLAN 1
Erythema and warmth of RLE, no blisters, no discharge or fluctuance. US doppler of RLE neg for DVT. Patient with no fevers or elevated WBC.   - Cefepime stopped yesterday continue Vancomycin 1250mg Q24 vanc trough tonight  - Area marked, monitor for spread  - BCx 1/2 NGTD

## 2021-01-03 NOTE — DISCHARGE NOTE PROVIDER - NSDCFUSCHEDAPPT_GEN_ALL_CORE_FT
NURIA GARCIA ; 01/05/2021 ; [facility] [address]  NURIA GARCIA ; 01/05/2021 ; [facility] [address] ; 01/19/2021 ; [facility] OrthoS[facility] [address] [facility] NURIA GARCIA ; 01/19/2021 ; [facility] OrthoSur [address] [facility]

## 2021-01-03 NOTE — DISCHARGE NOTE PROVIDER - NSDCCPCAREPLAN_GEN_ALL_CORE_FT
PRINCIPAL DISCHARGE DIAGNOSIS  Diagnosis: Pain of right knee and lower leg  Assessment and Plan of Treatment: You came to the hospital because of right lower leg pain, swelling, and redness. We initially thought that this could be due to a skin infection and gave you antibiotics. However, because your lab work never showed evidence of      SECONDARY DISCHARGE DIAGNOSES  Diagnosis: Osteoarthritis  Assessment and Plan of Treatment:      PRINCIPAL DISCHARGE DIAGNOSIS  Diagnosis: Pain of right knee and lower leg  Assessment and Plan of Treatment: You came to the hospital because of right lower leg pain, swelling, and redness. We initially thought that this could be due to a skin infection vs a clot given your history of right leg clot, or skin irritation due to blood pooling associated wtih prolonged standing and or sitting. We gave you antibiotics. However, because your lab work never showed evidence of infection, and ultrasound did not show any clots, we discontinued treatment. Your right lower leg is likely due to the least      SECONDARY DISCHARGE DIAGNOSES  Diagnosis: Osteoarthritis  Assessment and Plan of Treatment:      PRINCIPAL DISCHARGE DIAGNOSIS  Diagnosis: Pain of right knee and lower leg  Assessment and Plan of Treatment: You came to the hospital because of right lower leg pain, swelling, and redness. We initially thought that this could be due to a skin infection vs a clot given your history of right leg clot, or skin irritation due to blood pooling associated wtih prolonged standing and or sitting. We gave you antibiotics. However, because your lab work never showed evidence of infection, and ultrasound did not show any clots, we discontinued treatment. Your right lower leg is likely due to the last possibility, meaning a skin irritation from blood pooling associated with prolonged standing and sitting.   To prevent blood pooling, please wear tight socks (compression stockings), frequently elevate your legs, and avoid standing/sitting for a prolonged period of time. Please follow up with your primary care doctor in 1 week to discuss any further needs for medication adjustment and or leg pain.      SECONDARY DISCHARGE DIAGNOSES  Diagnosis: Osteoarthritis  Assessment and Plan of Treatment: You were previously diagnosed with arthritis in the right hip. Orthopedic surgeons (joint specialists) came to see you and would like for you to follow up with them within 1-2 weeks to discuss plans for right hip replacement.   You may take tylenol and oxycodone prescribed by your pain specialist as needed for pain control. Additionally, you may also add heat packs and over the counter lidocaine patches for additional pain relief.   Please call for appt: [phone] with Dr. Guerrero to schedule an appointment within 1-2 weeks.

## 2021-01-03 NOTE — PROGRESS NOTE ADULT - PROBLEM SELECTOR PLAN 3
Moderately elevated creatinine 1.47; appears baseline fluctuates between 1.4-2.1 dating back to 2015, likely has CKD stage 4 given eGFR 20s   - Avoid nephrotoxics  - Maintain adequate hydration  - Renally dose abx

## 2021-01-03 NOTE — DISCHARGE NOTE PROVIDER - HOSPITAL COURSE
80 year old female with hx of CHF, CAD s/p CABG in Nov 2020, R breast cancer (1980s) s/p mastectomy with chronic RUE lymphedema, colon cancer s/p resection, rectal cancer s/p chemo and radiation, HTN, HLD, T2DM, DVT on eliquis, renal insufficiency, and obesity, presenting with RLE pain and redness.   # RLE pain and erythema  Initially concern for cellulitis, vancomycin and cefepime initiated. Patient remained afebrile, WBC WNL, Blood culture x2 NGTD, UA negative, urine culture contaminated with greater than 3 organisms. ID consulted, low suspicion for cellulitis, antibiotics discontinued on 1/3. Duplex US bilateral LE no evidence of DVT.     # R hip arthrosis  Patient has known history of severe R hip arthrosis, indicated for MARLA but deferred for CABG prior to current hospitalization. During this admission, x-ray of R hip demonstrates    no acute fractures or dislocations, severe hip joint arthrosis. Orthopedic surgery team evaluated patient, recommended WBAT RLE with assistive devices as needed. Patient pain controlled with oxycodone and tylenol. Orthopedics team recommended defer operative treatment of R hip arthrosis for now due to concern for RLE infection as per above, patient will follow up with Dr Guerrero in outpatient office in 1-2 weeks or upon discharge from the hospital, call for appt: [phone].     PT evaluated patient, recommended RAMIRO.        80 year old female with hx of CHF, CAD s/p CABG in Nov 2020, R breast cancer (1980s) s/p mastectomy with chronic RUE lymphedema, colon cancer s/p resection, rectal cancer s/p chemo and radiation, HTN, HLD, T2DM, DVT on eliquis, renal insufficiency, and obesity, presenting with RLE pain and redness.     # RLE pain and erythema  Initially concern for cellulitis, vancomycin and cefepime initiated. Patient remained afebrile, WBC WNL, Blood culture x2 NGTD, UA negative, urine culture contaminated with greater than 3 organisms. ID consulted, low suspicion for cellulitis, antibiotics discontinued on 1/3. Duplex US bilateral LE no evidence of DVT.     # R hip arthrosis  Patient has known history of severe R hip arthrosis, indicated for MARLA but deferred for CABG prior to current hospitalization. During this admission, x-ray of R hip demonstrates no acute fractures or dislocations, severe hip joint arthrosis. Orthopedic surgery team evaluated patient, recommended WBAT RLE with assistive devices as needed. Patient pain controlled with oxycodone and tylenol. Orthopedics team recommended defer operative treatment given elective nature of procedure, and recommended patient to follow up with Dr Guerrero in outpatient office in 1-2 weeks or upon discharge from the hospital, call for appt: [phone].     PT evaluated patient, recommended RAMIRO however patient and family preferring home with home PT.

## 2021-01-03 NOTE — DISCHARGE NOTE PROVIDER - NSDCMRMEDTOKEN_GEN_ALL_CORE_FT
amLODIPine 5 mg oral tablet: 1 tab(s) orally once a day  Eliquis 2.5 mg oral tablet: 1 tab(s) orally 2 times a day  famotidine 40 mg oral tablet: 1 tab(s) intravenous once a day (at bedtime)  glimepiride 2 mg oral tablet: 1 tab(s) orally once a day  Januvia 25 mg oral tablet: 1 tab(s) orally once a day  oxyCODONE 5 mg oral tablet: 2 tab(s) orally every 8 hours, As Needed - 6)  Toprol- mg oral tablet, extended release: 1.5 tab(s) orally once a day   acetaminophen 325 mg oral tablet: 2 tab(s) orally every 6 hours, As needed, Temp greater or equal to 38C (100.4F), Mild Pain (1 - 3), Moderate Pain (4 - 6)  amLODIPine 5 mg oral tablet: 1 tab(s) orally once a day  aspirin 81 mg oral delayed release tablet: 1 tab(s) orally once a day  Eliquis 2.5 mg oral tablet: 1 tab(s) orally 2 times a day  famotidine 40 mg oral tablet: 1 tab(s) intravenous once a day (at bedtime)  glimepiride 2 mg oral tablet: 1 tab(s) orally once a day  Januvia 25 mg oral tablet: 1 tab(s) orally once a day  Lipitor 40 mg oral tablet: 1 tab(s) orally once a day (at bedtime)  oxyCODONE 5 mg oral tablet: 2 tab(s) orally every 8 hours, As Needed - 6)  polyethylene glycol 3350 oral powder for reconstitution: 17 gram(s) orally once a day  Toprol- mg oral tablet, extended release: 1.5 tab(s) orally once a day   acetaminophen 325 mg oral tablet: 2 tab(s) orally every 6 hours, As needed, Temp greater or equal to 38C (100.4F), Mild Pain (1 - 3), Moderate Pain (4 - 6)  amLODIPine 5 mg oral tablet: 1 tab(s) orally once a day  aspirin 81 mg oral delayed release tablet: 1 tab(s) orally once a day  Eliquis 2.5 mg oral tablet: 1 tab(s) orally 2 times a day  famotidine 40 mg oral tablet: 1 tab(s) intravenous once a day (at bedtime)  glimepiride 2 mg oral tablet: 1 tab(s) orally once a day  Januvia 25 mg oral tablet: 1 tab(s) orally once a day  Lipitor 40 mg oral tablet: 1 tab(s) orally once a day (at bedtime)  polyethylene glycol 3350 oral powder for reconstitution: 17 gram(s) orally once a day  Toprol- mg oral tablet, extended release: 1.5 tab(s) orally once a day   acetaminophen 325 mg oral tablet: 2 tab(s) orally every 6 hours, As needed, Temp greater or equal to 38C (100.4F), Mild Pain (1 - 3), Moderate Pain (4 - 6)  amLODIPine 5 mg oral tablet: 1 tab(s) orally once a day  aspirin 81 mg oral delayed release tablet: 1 tab(s) orally once a day  compression stockings : Leg swelling.   Eliquis 2.5 mg oral tablet: 1 tab(s) orally 2 times a day  famotidine 40 mg oral tablet: 1 tab(s) intravenous once a day (at bedtime)  glimepiride 2 mg oral tablet: 1 tab(s) orally once a day  Januvia 25 mg oral tablet: 1 tab(s) orally once a day  Lipitor 40 mg oral tablet: 1 tab(s) orally once a day (at bedtime)  polyethylene glycol 3350 oral powder for reconstitution: 17 gram(s) orally once a day  Toprol- mg oral tablet, extended release: 1.5 tab(s) orally once a day

## 2021-01-03 NOTE — PROGRESS NOTE ADULT - ATTENDING COMMENTS
Patient seen and examined. d/w medicine team as above.   RLE pain: clinical finding now consistent with cellulitis. would get ID for eval. potential stop abx and monitor. check right knee xray, PT  H/o DVT: resumed on eliquis . LE duplex during this hosp neg.  patient is at high risk for recurrent DVTs given immbolization, h/o CA  AVNRT: overnight issues noted. would increase Toprol to 150mg daily (home dose) from 100mg

## 2021-01-03 NOTE — CONSULT NOTE ADULT - ASSESSMENT
79 y/o F PMHx of CHF, previous breast, colon, and rectal ca, chronic RUE lymphedema presented with RLE pain and redness.    RLE swelling  -no evidence of cellulitis at this time, leg is not warm, no overlying erythema, fluctuance, purulence  -no systemic signs of indicate infectious including fever or leukocytosis.  -Bcx NTD x 2 sets  -no indication for antibiotics treatment at this time    PCN Allergy  -pt states it was remote, cannot say when, facial swelling. States she has taken PCN since without issue. Tolerated cephalosporins this admisison. 81 y/o F PMHx of CHF, previous breast, colon, and rectal ca, chronic RUE lymphedema presented with RLE pain and redness.      RLE swelling, r/o cellulitis:   -no evidence of cellulitis at this time, leg is not warm, no overlying erythema, fluctuance, purulence, skin findings more compatible with venous stasis dermatitis   -no systemic signs of indicate infectious including fever or leukocytosis.  -Bcx NTD x 2 sets  -no indication for antibiotics treatment at this time  -leg elevation  -compression stockings       PCN Allergy  -pt states it was remote, cannot say when, facial swelling. States she has taken PCN since without issue. Tolerated cephalosporins this admisison.

## 2021-01-03 NOTE — DISCHARGE NOTE PROVIDER - CARE PROVIDER_API CALL
Sanford Guerrero  ORTHOPAEDIC SURGERY  [address], [address]  [place] [address]  Phone: [phone]  Fax: [phone]  Scheduled Appointment: 01/19/2021   Sanford Guerrero  ORTHOPAEDIC SURGERY  [address], [address]  [place] [address]  Phone: [phone]  Fax: [phone]  Established Patient  Scheduled Appointment: 01/19/2021    Luciano Dawkins  INTERNAL MEDICINE  [address] [place] [address]  Phone: [phone]  Fax: [phone]  Established Patient  Follow Up Time: 1 week

## 2021-01-03 NOTE — PROVIDER CONTACT NOTE (OTHER) - BACKGROUND
Pt admitted for cellulitis with PMH of DM, CHF, HLD, HTN, CAD, s/p mastectomy.
Pt admitted with cellulitis with PMH of DM, CAD, CHF, HTN, HLD and s/p R mastectomy
Pt admitted for cellulitis of R lower extremity. PMH: CAD, CHF, s/p CABG one week ago
Pt admitted with cellulitis with PMH of DM, HTN, HLD, CAD and R breast mastectomy

## 2021-01-03 NOTE — PROVIDER CONTACT NOTE (OTHER) - ASSESSMENT
Pt denies any pain or discomfort. Vitals as charted
Pt A&Ox3-4, Resting comfortably in bed. VS as charted. Pt complained of some pain in R leg/ hip requesting Tylenol
Pt denies chest pain or discomfort, vitals as charted
Pt denies any chest pain or discomfort. Vitals as charted with /82 and HR 84.

## 2021-01-03 NOTE — PROVIDER CONTACT NOTE (OTHER) - REASON
Pt had PAT 23 secs with 
According to Tele tech, Pt had 2.45 seconds of PAT,  & 7 beats of AIVR
Pt had PAF 8 secs with 
Pt had PAT 2.2 sec with

## 2021-01-04 ENCOUNTER — TRANSCRIPTION ENCOUNTER (OUTPATIENT)
Age: 81
End: 2021-01-04

## 2021-01-04 VITALS
OXYGEN SATURATION: 98 % | TEMPERATURE: 98 F | DIASTOLIC BLOOD PRESSURE: 67 MMHG | RESPIRATION RATE: 18 BRPM | HEART RATE: 76 BPM | SYSTOLIC BLOOD PRESSURE: 151 MMHG

## 2021-01-04 LAB
ANION GAP SERPL CALC-SCNC: 12 MMOL/L — SIGNIFICANT CHANGE UP (ref 5–17)
BASOPHILS # BLD AUTO: 0.02 K/UL — SIGNIFICANT CHANGE UP (ref 0–0.2)
BASOPHILS NFR BLD AUTO: 0.3 % — SIGNIFICANT CHANGE UP (ref 0–2)
BUN SERPL-MCNC: 23 MG/DL — SIGNIFICANT CHANGE UP (ref 7–23)
CALCIUM SERPL-MCNC: 9.5 MG/DL — SIGNIFICANT CHANGE UP (ref 8.4–10.5)
CHLORIDE SERPL-SCNC: 104 MMOL/L — SIGNIFICANT CHANGE UP (ref 96–108)
CO2 SERPL-SCNC: 24 MMOL/L — SIGNIFICANT CHANGE UP (ref 22–31)
CREAT SERPL-MCNC: 1.47 MG/DL — HIGH (ref 0.5–1.3)
EOSINOPHIL # BLD AUTO: 0.12 K/UL — SIGNIFICANT CHANGE UP (ref 0–0.5)
EOSINOPHIL NFR BLD AUTO: 1.5 % — SIGNIFICANT CHANGE UP (ref 0–6)
GLUCOSE BLDC GLUCOMTR-MCNC: 143 MG/DL — HIGH (ref 70–99)
GLUCOSE BLDC GLUCOMTR-MCNC: 169 MG/DL — HIGH (ref 70–99)
GLUCOSE SERPL-MCNC: 126 MG/DL — HIGH (ref 70–99)
HCT VFR BLD CALC: 34 % — LOW (ref 34.5–45)
HGB BLD-MCNC: 10.3 G/DL — LOW (ref 11.5–15.5)
IMM GRANULOCYTES NFR BLD AUTO: 0.3 % — SIGNIFICANT CHANGE UP (ref 0–1.5)
LYMPHOCYTES # BLD AUTO: 1.64 K/UL — SIGNIFICANT CHANGE UP (ref 1–3.3)
LYMPHOCYTES # BLD AUTO: 20.5 % — SIGNIFICANT CHANGE UP (ref 13–44)
MAGNESIUM SERPL-MCNC: 2 MG/DL — SIGNIFICANT CHANGE UP (ref 1.6–2.6)
MCHC RBC-ENTMCNC: 26.6 PG — LOW (ref 27–34)
MCHC RBC-ENTMCNC: 30.3 GM/DL — LOW (ref 32–36)
MCV RBC AUTO: 87.9 FL — SIGNIFICANT CHANGE UP (ref 80–100)
MONOCYTES # BLD AUTO: 0.86 K/UL — SIGNIFICANT CHANGE UP (ref 0–0.9)
MONOCYTES NFR BLD AUTO: 10.8 % — SIGNIFICANT CHANGE UP (ref 2–14)
NEUTROPHILS # BLD AUTO: 5.34 K/UL — SIGNIFICANT CHANGE UP (ref 1.8–7.4)
NEUTROPHILS NFR BLD AUTO: 66.6 % — SIGNIFICANT CHANGE UP (ref 43–77)
NRBC # BLD: 0 /100 WBCS — SIGNIFICANT CHANGE UP (ref 0–0)
PHOSPHATE SERPL-MCNC: 3.5 MG/DL — SIGNIFICANT CHANGE UP (ref 2.5–4.5)
PLATELET # BLD AUTO: 317 K/UL — SIGNIFICANT CHANGE UP (ref 150–400)
POTASSIUM SERPL-MCNC: 3.8 MMOL/L — SIGNIFICANT CHANGE UP (ref 3.5–5.3)
POTASSIUM SERPL-SCNC: 3.8 MMOL/L — SIGNIFICANT CHANGE UP (ref 3.5–5.3)
RBC # BLD: 3.87 M/UL — SIGNIFICANT CHANGE UP (ref 3.8–5.2)
RBC # FLD: 15.3 % — HIGH (ref 10.3–14.5)
SODIUM SERPL-SCNC: 140 MMOL/L — SIGNIFICANT CHANGE UP (ref 135–145)
WBC # BLD: 8 K/UL — SIGNIFICANT CHANGE UP (ref 3.8–10.5)
WBC # FLD AUTO: 8 K/UL — SIGNIFICANT CHANGE UP (ref 3.8–10.5)

## 2021-01-04 PROCEDURE — 99239 HOSP IP/OBS DSCHRG MGMT >30: CPT

## 2021-01-04 RX ORDER — ACETAMINOPHEN 500 MG
2 TABLET ORAL
Qty: 0 | Refills: 0 | DISCHARGE
Start: 2021-01-04

## 2021-01-04 RX ORDER — ASPIRIN/CALCIUM CARB/MAGNESIUM 324 MG
1 TABLET ORAL
Qty: 0 | Refills: 0 | DISCHARGE
Start: 2021-01-04

## 2021-01-04 RX ORDER — ATORVASTATIN CALCIUM 80 MG/1
1 TABLET, FILM COATED ORAL
Qty: 0 | Refills: 0 | DISCHARGE
Start: 2021-01-04

## 2021-01-04 RX ORDER — POLYETHYLENE GLYCOL 3350 17 G/17G
17 POWDER, FOR SOLUTION ORAL
Qty: 0 | Refills: 0 | DISCHARGE
Start: 2021-01-04

## 2021-01-04 RX ADMIN — Medication 81 MILLIGRAM(S): at 13:11

## 2021-01-04 RX ADMIN — POLYETHYLENE GLYCOL 3350 17 GRAM(S): 17 POWDER, FOR SOLUTION ORAL at 13:11

## 2021-01-04 RX ADMIN — Medication 150 MILLIGRAM(S): at 06:02

## 2021-01-04 RX ADMIN — INSULIN GLARGINE 10 UNIT(S): 100 INJECTION, SOLUTION SUBCUTANEOUS at 08:55

## 2021-01-04 RX ADMIN — Medication 1: at 13:12

## 2021-01-04 RX ADMIN — APIXABAN 2.5 MILLIGRAM(S): 2.5 TABLET, FILM COATED ORAL at 06:02

## 2021-01-04 NOTE — PROGRESS NOTE ADULT - PROBLEM SELECTOR PLAN 7
- hold amlodipine for now given LE swelling  - consider starting ARB
Hypertensive to 140s to 160s   - hold amlodipine for now given LE swelling  - consider starting ARB
Hypertensive to 140s to 160s   - hold amlodipine for now given LE swelling  - consider starting ARB

## 2021-01-04 NOTE — PROGRESS NOTE ADULT - PROBLEM SELECTOR PLAN 4
- Monitor on telemetry  - c/w atorvastatin  - c/w ASA 81

## 2021-01-04 NOTE — DISCHARGE NOTE NURSING/CASE MANAGEMENT/SOCIAL WORK - PATIENT PORTAL LINK FT
You can access the FollowMyHealth Patient Portal offered by [facility] by registering at the following website: [email or web address]. By joining [employer]’s FollowMyHealth portal, you will also be able to view your health information using other applications (apps) compatible with our system.

## 2021-01-04 NOTE — PROGRESS NOTE ADULT - PROBLEM SELECTOR PLAN 6
- F/u HbA1C  - Start lantus 10units in morning  - Low ISS   - CC diet  - Monitor FS
- A1C 7.2  - Lantus 10U in AM   - Low ISS   - CC diet  - Monitor FS - well controlled
- A1C 7.2  - Lantus 10U in AM   - Low ISS   - CC diet  - Monitor FS - well controlled

## 2021-01-04 NOTE — PROGRESS NOTE ADULT - SUBJECTIVE AND OBJECTIVE BOX
PROGRESS NOTE:   Authored by Dr. Sonya Carlin MD (PGY-1). Pager [phone]    Patient is a 80y old  Female who presents with a chief complaint of Cellulitis (03 Jan 2021 18:43)      SUBJECTIVE / OVERNIGHT EVENTS:  No acute events overnight. Patient denies fevers, chills, chest pain, SOB, n/v.      ADDITIONAL REVIEW OF SYSTEMS:    MEDICATIONS  (STANDING):  apixaban 2.5 milliGRAM(s) Oral two times a day  aspirin enteric coated 81 milliGRAM(s) Oral daily  atorvastatin 40 milliGRAM(s) Oral at bedtime  dextrose 40% Gel 15 Gram(s) Oral once  dextrose 5%. 1000 milliLiter(s) (50 mL/Hr) IV Continuous <Continuous>  dextrose 5%. 1000 milliLiter(s) (100 mL/Hr) IV Continuous <Continuous>  dextrose 50% Injectable 25 Gram(s) IV Push once  dextrose 50% Injectable 12.5 Gram(s) IV Push once  dextrose 50% Injectable 25 Gram(s) IV Push once  glucagon  Injectable 1 milliGRAM(s) IntraMuscular once  insulin glargine Injectable (LANTUS) 10 Unit(s) SubCutaneous every morning  insulin lispro (ADMELOG) corrective regimen sliding scale   SubCutaneous three times a day before meals  insulin lispro (ADMELOG) corrective regimen sliding scale   SubCutaneous at bedtime  metoprolol succinate  milliGRAM(s) Oral daily  polyethylene glycol 3350 17 Gram(s) Oral daily    MEDICATIONS  (PRN):  acetaminophen   Tablet .. 650 milliGRAM(s) Oral every 6 hours PRN Temp greater or equal to 38C (100.4F), Mild Pain (1 - 3), Moderate Pain (4 - 6)  oxyCODONE    IR 5 milliGRAM(s) Oral every 6 hours PRN Severe Pain (7 - 10)  senna 1 Tablet(s) Oral daily PRN Constipation      CAPILLARY BLOOD GLUCOSE      POCT Blood Glucose.: 160 mg/dL (03 Jan 2021 21:36)  POCT Blood Glucose.: 138 mg/dL (03 Jan 2021 17:31)  POCT Blood Glucose.: 162 mg/dL (03 Jan 2021 12:21)  POCT Blood Glucose.: 128 mg/dL (03 Jan 2021 08:22)    I&O's Summary    02 Jan 2021 07:01  -  03 Jan 2021 07:00  --------------------------------------------------------  IN: 710 mL / OUT: 2025 mL / NET: -1315 mL    03 Jan 2021 07:01  -  04 Jan 2021 06:31  --------------------------------------------------------  IN: 960 mL / OUT: 2300 mL / NET: -1340 mL        PHYSICAL EXAM:  Vital Signs Last 24 Hrs  T(C): 36.6 (04 Jan 2021 05:47), Max: 36.8 (03 Jan 2021 21:00)  T(F): 97.8 (04 Jan 2021 05:47), Max: 98.2 (03 Jan 2021 21:00)  HR: 73 (04 Jan 2021 05:47) (70 - 81)  BP: 158/71 (04 Jan 2021 05:47) (156/87 - 158/71)  BP(mean): --  RR: 18 (04 Jan 2021 05:47) (18 - 18)  SpO2: 97% (04 Jan 2021 05:47) (96% - 98%)    CONSTITUTIONAL: NAD, well-developed, obese elderly female resting comfortably in bed with head of bed elevated to about 60 degrees   RESPIRATORY: Normal respiratory effort; lungs are clear to auscultation bilaterally, crackles in lower lung bases L more than R   CARDIOVASCULAR: Regular rate and rhythm, normal S1 and S2, no murmur/rub/gallop;  Peripheral pulses are 2+ bilaterally  ABDOMEN: Nontender to palpation, normoactive bowel sounds, no rebound/guarding  MUSCLOSKELETAL: RLE swelling with some redness and warmth, based on demarcation receding inflammation   PSYCH: A+O to person, place, and time; affect appropriate    LABS:                        10.3   8.00  )-----------( 317      ( 04 Jan 2021 06:25 )             34.0     01-03    139  |  101  |  22  ----------------------------<  134<H>  3.8   |  26  |  1.40<H>    Ca    9.8      03 Jan 2021 07:16  Phos  3.0     01-03  Mg     2.0     01-03      Culture - Blood (collected 02 Jan 2021 00:56)  Source: .Blood Blood-Peripheral  Preliminary Report (03 Jan 2021 01:02):    No growth to date.    Culture - Blood (collected 02 Jan 2021 00:56)  Source: .Blood Blood-Peripheral  Preliminary Report (03 Jan 2021 01:02):    No growth to date.    Culture - Urine (collected 02 Jan 2021 00:43)  Source: .Urine Clean Catch (Midstream)  Final Report (03 Jan 2021 07:22):    >=3 organisms. Probable collection contamination.    RADIOLOGY & ADDITIONAL TESTS:    R knee xray 4 views   Impression:    Status post total right knee replacement. No acute fractures could be identified. No joint effusions.       PROGRESS NOTE:   Authored by Dr. Sonya Carlin MD (PGY-1). Pager [phone]    Patient is a 80y old  Female who presents with a chief complaint of Cellulitis (03 Jan 2021 18:43)      SUBJECTIVE / OVERNIGHT EVENTS:   # [id number]    No acute events overnight. Patient reports feeling a bit better today, but her right knee is still painful and bothersome. She inquired if the infection could be the reason for her knee pain. We discussed her severe OA in right hip, which may at time present as referred knee pain. Right knee xray results reviewed with patient.       ADDITIONAL REVIEW OF SYSTEMS:  Patient denies fevers, chills, chest pain, SOB, n/v.    MEDICATIONS  (STANDING):  apixaban 2.5 milliGRAM(s) Oral two times a day  aspirin enteric coated 81 milliGRAM(s) Oral daily  atorvastatin 40 milliGRAM(s) Oral at bedtime  dextrose 40% Gel 15 Gram(s) Oral once  dextrose 5%. 1000 milliLiter(s) (50 mL/Hr) IV Continuous <Continuous>  dextrose 5%. 1000 milliLiter(s) (100 mL/Hr) IV Continuous <Continuous>  dextrose 50% Injectable 25 Gram(s) IV Push once  dextrose 50% Injectable 12.5 Gram(s) IV Push once  dextrose 50% Injectable 25 Gram(s) IV Push once  glucagon  Injectable 1 milliGRAM(s) IntraMuscular once  insulin glargine Injectable (LANTUS) 10 Unit(s) SubCutaneous every morning  insulin lispro (ADMELOG) corrective regimen sliding scale   SubCutaneous three times a day before meals  insulin lispro (ADMELOG) corrective regimen sliding scale   SubCutaneous at bedtime  metoprolol succinate  milliGRAM(s) Oral daily  polyethylene glycol 3350 17 Gram(s) Oral daily    MEDICATIONS  (PRN):  acetaminophen   Tablet .. 650 milliGRAM(s) Oral every 6 hours PRN Temp greater or equal to 38C (100.4F), Mild Pain (1 - 3), Moderate Pain (4 - 6)  oxyCODONE    IR 5 milliGRAM(s) Oral every 6 hours PRN Severe Pain (7 - 10)  senna 1 Tablet(s) Oral daily PRN Constipation      CAPILLARY BLOOD GLUCOSE      POCT Blood Glucose.: 160 mg/dL (03 Jan 2021 21:36)  POCT Blood Glucose.: 138 mg/dL (03 Jan 2021 17:31)  POCT Blood Glucose.: 162 mg/dL (03 Jan 2021 12:21)  POCT Blood Glucose.: 128 mg/dL (03 Jan 2021 08:22)    I&O's Summary    02 Jan 2021 07:01  -  03 Jan 2021 07:00  --------------------------------------------------------  IN: 710 mL / OUT: 2025 mL / NET: -1315 mL    03 Jan 2021 07:01  -  04 Jan 2021 06:31  --------------------------------------------------------  IN: 960 mL / OUT: 2300 mL / NET: -1340 mL        PHYSICAL EXAM:  Vital Signs Last 24 Hrs  T(C): 36.6 (04 Jan 2021 05:47), Max: 36.8 (03 Jan 2021 21:00)  T(F): 97.8 (04 Jan 2021 05:47), Max: 98.2 (03 Jan 2021 21:00)  HR: 73 (04 Jan 2021 05:47) (70 - 81)  BP: 158/71 (04 Jan 2021 05:47) (156/87 - 158/71)  BP(mean): --  RR: 18 (04 Jan 2021 05:47) (18 - 18)  SpO2: 97% (04 Jan 2021 05:47) (96% - 98%)    CONSTITUTIONAL: NAD, well-developed, obese elderly female resting comfortably in bed with head of bed elevated to about 60 degrees   RESPIRATORY: Normal respiratory effort; lungs are clear to auscultation bilaterally, crackles in lower lung bases L more than R   CARDIOVASCULAR: Regular rate and rhythm, normal S1 and S2, no murmur/rub/gallop;  Peripheral pulses are 2+ bilaterally  ABDOMEN: Nontender to palpation, normoactive bowel sounds, no rebound/guarding  MUSCLOSKELETAL: RLE swelling with some redness and warmth, based on demarcation receding inflammation, stable and similar on exam from days prior; no induration or drainage   PSYCH: A+O to person, place, and time; affect appropriate    LABS:                        10.3   8.00  )-----------( 317      ( 04 Jan 2021 06:25 )             34.0     01-03    139  |  101  |  22  ----------------------------<  134<H>  3.8   |  26  |  1.40<H>    Ca    9.8      03 Jan 2021 07:16  Phos  3.0     01-03  Mg     2.0     01-03      Culture - Blood (collected 02 Jan 2021 00:56)  Source: .Blood Blood-Peripheral  Preliminary Report (03 Jan 2021 01:02):    No growth to date.    Culture - Blood (collected 02 Jan 2021 00:56)  Source: .Blood Blood-Peripheral  Preliminary Report (03 Jan 2021 01:02):    No growth to date.    Culture - Urine (collected 02 Jan 2021 00:43)  Source: .Urine Clean Catch (Midstream)  Final Report (03 Jan 2021 07:22):    >=3 organisms. Probable collection contamination.    RADIOLOGY & ADDITIONAL TESTS:    R knee xray 4 views   Impression:    Status post total right knee replacement. No acute fractures could be identified. No joint effusions.

## 2021-01-04 NOTE — PROGRESS NOTE ADULT - PROBLEM SELECTOR PLAN 1
Erythema and warmth of RLE, no blisters, no discharge or fluctuance. US doppler of RLE neg for DVT. Patient with no fevers or elevated WBC.   - Cefepime stopped 1/2 continue Vancomycin 1250mg Q24 (1/1, 1/2)   - Area marked, monitor for spread  - BCx 1/2 NGTD Erythema and warmth of RLE, no blisters, no discharge or fluctuance. US doppler of LE neg for DVT. Patient with no fevers or elevated WBC.   - Cefepime stopped 1/2   - c/w Vancomycin 1250mg Q24 (1/1, 1/2)   - Area marked, monitor for spread  - BCx 1/2 NGTD  - ID following, appreciate recs; concern for venous stasis dermatitis Erythema and warmth of RLE, no blisters, no discharge or fluctuance. US doppler of LE neg for DVT. Patient with no fevers or elevated WBC.   - Cefepime & Vancomycin discontinued   - Area marked, monitor for spread  - BCx 1/2 NGTD  - ID following, appreciate recs; concern for venous stasis dermatitis, dc abx

## 2021-01-04 NOTE — PROGRESS NOTE ADULT - PROBLEM SELECTOR PLAN 2
Known severe osteoarthritis of R hip but hip replacement deferred due to recent CABG. Hip radiograph demonstrating severe OA but no fractures  - Seen and evaluated by ortho, no acute intervention at this time given patient's current infection in affected limb  - Pain management: tylenol prn for pain  - PT eval rec RAMIRO   - weight bearing as tolerated of affected limb
Known severe osteoarthritis of R hip but hip replacement deferred due to recent CABG. Hip radiograph demonstrating severe OA but no fractures  - Seen and evaluated by ortho, no acute intervention at this time given patient's current infection in affected limb  - Pain management: tylenol and oxycodone PRN   - PT eval rec RAMIRO   - weight bearing as tolerated of affected limb
Known severe osteoarthritis of R hip but hip replacement deferred due to recent CABG. Hip radiograph demonstrating severe OA but no fractures  - Seen and evaluated by ortho, no acute intervention at this time given patient's current infection in affected limb  - Pain management: tylenol prn for pain  - PT eval  - weight bearing as tolerated of affected limb

## 2021-01-04 NOTE — PROGRESS NOTE ADULT - ATTENDING COMMENTS
Ligia Vyas MD  Division of Hospital Medicine  [facility]   Pager: [phone]    Patient seen and examined today with Team 5 Resident, Intern, and MS3. Agree with above findings, assessment, and plan with the following additions/exceptions:    Overall, 79 yo [place]ish-speaking female  with PMH of CHF, CAD s/p CABG, breast cancer s/p mastectomy, CRC s/p radiation and resection, Right femoral vein DVT on extended Eliquis, HTN, HLD, T2DM, and known severe R Hip OA who presented with RLE pain and redness. Afebrile, no leukocytosis. No warmth on exam. Infectious work-up negative. Duplex negative for DVT. Evaluated by ID inpatient as well, stating exam more consistent with venous stasis changes, thus abx discontinued. For her severe R hip OA/pain, to follow-up with her pain management and ortho as outpatient.     Rx given for compression stockings.  Patient to elevated RLE as much as possible.   WBAT. outpatient follow-up.    Recommended for RAMIRO, but patient and daughter would like home with home PT.  Discharge planning time spent: 42 minutes    Rest as detailed in note above. Ligia Vyas MD  Division of Hospital Medicine  [facility]   Pager: [phone]    Patient seen and examined today with Team 5 Resident, Intern, and MS3. Agree with above findings, assessment, and plan with the following additions/exceptions:    Overall, 81 yo Portuguese-speaking female  with PMH of CHF, CAD s/p CABG, breast cancer s/p mastectomy, CRC s/p radiation and resection, Right femoral vein DVT on extended Eliquis, HTN, HLD, T2DM, and known severe R Hip OA who presented with RLE pain and redness. Afebrile, no leukocytosis. No warmth on exam. Infectious work-up negative. Duplex negative for DVT. R knee XR with no acute pathology. Evaluated by ID inpatient as well, stating exam more consistent with venous stasis changes, thus abx discontinued. For her severe R hip OA/pain, to follow-up with her pain management and ortho as outpatient.     Rx given for compression stockings.  Patient to elevated RLE as much as possible.   WBAT. outpatient follow-up.    Recommended for RAMIRO, but patient and daughter would like home with home PT.  Discharge planning time spent: 42 minutes    Rest as detailed in note above.

## 2021-01-04 NOTE — PROGRESS NOTE ADULT - PROBLEM SELECTOR PLAN 8
- Medication list unable to be verified with patient or daughter. Will continue currently listed meds for now except coumadin and amlodipine. Call pharmacy for med rec.   - Patient unclear if she is taking Eliquis or any blood thinners  - Listed penicillin allergy in charts - unclear reaction, patient says the allergy has "resolved" and that her reaction was from 40+ years ago. Patient does not remember what happens when she receives penicillins. Will continue abx regimen with vancomycin for now pending verification.
- Med rec obtained   - Patient takes Eliquis for extending course as per daughter managed by [facility] cards  - Listed penicillin allergy in charts - unclear reaction, patient says the allergy has "resolved" and that her reaction was from 40+ years ago. Patient does not remember what happens when she receives penicillins. Will continue abx regimen with vancomycin for now pending verification.
- Med rec obtained   - Patient takes Eliquis for extending course as per daughter managed by [facility] cards  - Listed penicillin allergy in charts - unclear reaction, patient says the allergy has "resolved" and that her reaction was from 40+ years ago. Patient does not remember what happens when she receives penicillins. Will continue abx regimen with vancomycin for now pending verification.

## 2021-01-04 NOTE — PROGRESS NOTE ADULT - PROBLEM SELECTOR PROBLEM 4
Coronary artery disease involving coronary bypass graft

## 2021-01-04 NOTE — PROGRESS NOTE ADULT - PROBLEM SELECTOR PLAN 5
- Avoid aggressive IV hydration  - c/w metoprolol
Normal LVEF from [facility] 11/20 with stage 2 diastolic dysfunction   - Avoid aggressive IV hydration  - c/w metoprolol
Normal LVEF from [facility] 11/20 with stage 2 diastolic dysfunction   - Avoid aggressive IV hydration  - c/w metoprolol

## 2021-01-04 NOTE — PROGRESS NOTE ADULT - PROBLEM SELECTOR PLAN 9
DVT ppx: heparin subq 5000 TID  Diet: CC diet  Dispo: [facility]   Code: full code
DVT ppx: heparin subq 5000 TID  Diet: CC diet  Dispo: [facility]   Code: full code
DVT ppx: heparin subq 5000 TID  Diet: CC diet  Dispo: pending clinical improvement and PT eval  Code: full code

## 2021-01-04 NOTE — PROGRESS NOTE ADULT - ASSESSMENT
80 year old female with CHF, CAD s/p CABG, breast cancer s/p mastectomy, CRC s/p radiation and resection, DVT on Eliquis, HTN, HLD, and T2DM presenting with RLE pain and redness. Pain primarily in R hip, likely from severe AOA, and R lower leg, likely from cellulitis. Patient admitted for further management of RLE cellulitis.  
80 year old female with CHF, CAD s/p CABG, breast cancer s/p mastectomy, CRC s/p radiation and resection, Right femoral vein DVT on extended Eliquis, HTN, HLD, and T2DM presenting with RLE pain and redness. Pain primarily in R hip, likely from severe AOA, and R lower leg, likely from cellulitis. Patient admitted for further management of RLE cellulitis.  
80 year old female with CHF, CAD s/p CABG, breast cancer s/p mastectomy, CRC s/p radiation and resection, DVT on Eliquis, HTN, HLD, and T2DM presenting with RLE pain and redness. Pain primarily in R hip, likely from severe AOA, and R lower leg, likely from cellulitis. Patient admitted for further management of RLE cellulitis.

## 2021-01-05 ENCOUNTER — APPOINTMENT (OUTPATIENT)
Dept: CARDIOLOGY | Facility: CLINIC | Age: 81
End: 2021-01-05

## 2021-01-05 ENCOUNTER — APPOINTMENT (OUTPATIENT)
Dept: INTERNAL MEDICINE | Facility: CLINIC | Age: 81
End: 2021-01-05

## 2021-01-12 ENCOUNTER — NON-APPOINTMENT (OUTPATIENT)
Age: 81
End: 2021-01-12

## 2021-01-12 ENCOUNTER — APPOINTMENT (OUTPATIENT)
Dept: CARDIOLOGY | Facility: CLINIC | Age: 81
End: 2021-01-12
Payer: MEDICARE

## 2021-01-12 ENCOUNTER — APPOINTMENT (OUTPATIENT)
Dept: INTERNAL MEDICINE | Facility: CLINIC | Age: 81
End: 2021-01-12
Payer: MEDICARE

## 2021-01-12 VITALS — SYSTOLIC BLOOD PRESSURE: 136 MMHG | DIASTOLIC BLOOD PRESSURE: 80 MMHG

## 2021-01-12 VITALS
OXYGEN SATURATION: 97 % | RESPIRATION RATE: 14 BRPM | HEIGHT: 57 IN | SYSTOLIC BLOOD PRESSURE: 148 MMHG | HEART RATE: 79 BPM | DIASTOLIC BLOOD PRESSURE: 82 MMHG | TEMPERATURE: 97.5 F

## 2021-01-12 DIAGNOSIS — R06.00 DYSPNEA, UNSPECIFIED: ICD-10-CM

## 2021-01-12 PROCEDURE — 99214 OFFICE O/P EST MOD 30 MIN: CPT | Mod: 25

## 2021-01-12 PROCEDURE — 93000 ELECTROCARDIOGRAM COMPLETE: CPT

## 2021-01-12 PROCEDURE — 99214 OFFICE O/P EST MOD 30 MIN: CPT

## 2021-01-12 RX ORDER — SPIRONOLACTONE 25 MG/1
25 TABLET ORAL DAILY
Qty: 45 | Refills: 1 | Status: DISCONTINUED | COMMUNITY
Start: 2019-02-05 | End: 2021-01-12

## 2021-01-12 RX ORDER — HYDRALAZINE HYDROCHLORIDE 100 MG/1
100 TABLET ORAL
Qty: 180 | Refills: 1 | Status: DISCONTINUED | COMMUNITY
Start: 2018-03-05 | End: 2021-01-12

## 2021-01-12 RX ORDER — ATORVASTATIN CALCIUM 10 MG/1
10 TABLET, FILM COATED ORAL
Qty: 1 | Refills: 1 | Status: DISCONTINUED | COMMUNITY
Start: 2019-02-05 | End: 2021-01-12

## 2021-01-12 RX ORDER — MOMETASONE FUROATE 1 MG/G
0.1 CREAM TOPICAL TWICE DAILY
Qty: 1 | Refills: 3 | Status: DISCONTINUED | COMMUNITY
Start: 2019-02-08 | End: 2021-01-12

## 2021-01-12 RX ORDER — OXYCODONE 9 MG/1
9 CAPSULE, EXTENDED RELEASE ORAL
Refills: 0 | Status: DISCONTINUED | COMMUNITY
Start: 2020-03-03 | End: 2021-01-12

## 2021-01-12 RX ORDER — ISOSORBIDE MONONITRATE 60 MG/1
60 TABLET, EXTENDED RELEASE ORAL
Qty: 90 | Refills: 1 | Status: DISCONTINUED | COMMUNITY
Start: 2019-07-19 | End: 2021-01-12

## 2021-01-12 RX ORDER — CHLORHEXIDINE GLUCONATE 4 %
1000 LIQUID (ML) TOPICAL
Refills: 0 | Status: DISCONTINUED | COMMUNITY
End: 2021-01-12

## 2021-01-12 RX ORDER — ERGOCALCIFEROL 1.25 MG/1
1.25 MG CAPSULE, LIQUID FILLED ORAL
Qty: 5 | Refills: 1 | Status: DISCONTINUED | COMMUNITY
Start: 2018-02-22 | End: 2021-01-12

## 2021-01-12 RX ORDER — GABAPENTIN 600 MG/1
600 TABLET, FILM COATED ORAL TWICE DAILY
Qty: 60 | Refills: 5 | Status: DISCONTINUED | COMMUNITY
End: 2021-01-12

## 2021-01-12 RX ORDER — ADHESIVE TAPE 3"X 2.3 YD
50 MCG TAPE, NON-MEDICATED TOPICAL
Refills: 0 | Status: DISCONTINUED | COMMUNITY
End: 2021-01-12

## 2021-01-12 RX ORDER — BUMETANIDE 1 MG/1
1 TABLET ORAL
Qty: 90 | Refills: 2 | Status: DISCONTINUED | COMMUNITY
Start: 2019-02-08 | End: 2021-01-12

## 2021-01-18 NOTE — REVIEW OF SYSTEMS
[Shortness Of Breath] : no shortness of breath [Cough] : no cough [Dyspnea on Exertion] : dyspnea on exertion [Negative] : Psychiatric [FreeTextEntry1] : + chronic RLE edema

## 2021-01-18 NOTE — PHYSICAL EXAM
[No Acute Distress] : no acute distress [Well Nourished] : well nourished [Well Developed] : well developed [Normal Sclera/Conjunctiva] : normal sclera/conjunctiva [Well-Appearing] : well-appearing [EOMI] : extraocular movements intact [Normal Outer Ear/Nose] : the outer ears and nose were normal in appearance [Normal Oropharynx] : the oropharynx was normal [Normal TMs] : both tympanic membranes were normal [No JVD] : no jugular venous distention [Supple] : supple [No Lymphadenopathy] : no lymphadenopathy [No Respiratory Distress] : no respiratory distress  [No Accessory Muscle Use] : no accessory muscle use [Clear to Auscultation] : lungs were clear to auscultation bilaterally [Normal Rate] : normal rate  [Regular Rhythm] : with a regular rhythm [Normal S1, S2] : normal S1 and S2 [No Carotid Bruits] : no carotid bruits [Pedal Pulses Present] : the pedal pulses are present [Soft] : abdomen soft [Non Tender] : non-tender [Non-distended] : non-distended [Normal Bowel Sounds] : normal bowel sounds [Normal Anterior Cervical Nodes] : no anterior cervical lymphadenopathy [Normal Posterior Cervical Nodes] : no posterior cervical lymphadenopathy [No CVA Tenderness] : no CVA  tenderness [No Spinal Tenderness] : no spinal tenderness [No Joint Swelling] : no joint swelling [No Rash] : no rash [No Focal Deficits] : no focal deficits [Normal Affect] : the affect was normal [Normal Insight/Judgement] : insight and judgment were intact [de-identified] : + JAKY 2/6 RSB [de-identified] : + 1 edema/ + RLE lymphedema  [de-identified] : RT hip tenderness with ROM  [de-identified] : pt in wheelchair

## 2021-01-18 NOTE — HISTORY OF PRESENT ILLNESS
[FreeTextEntry1] : 1/2 > cardiac BYPASS > 11/9 > went to Rehab > out 12/26/ > back to the hosp 1/1 Rt leg cellulitis > off abx \par breast cancer ? Lymp RUE  [de-identified] : \par Patient is an 80 year old female who is accompanied by her daughter  with a history of HTN, hyperlipidemia, type 2 diabetes mellitus, breast cancer status post right partial mastectomy, rectal carcinoma status post resection, anemia, right lower extremity DVT, CAD s/p 2 vessel CABG 11/9/2020 and renal insufficiency who presents today for follow up after recent hospitalization and discharge from Rehab . At the beginning of November she was hospitalized for shortness of breath and right hip pain. She had a cardiac catheterization done and than was referred for bypass surgery. After the surgery she was in rehab until the end of December. Her shortness of breath has improved since the surgery. On January 1st she went back to the hospital for right lower extremity swelling, erythema and pain. She had LE doppler done that was negative for DVT and she was initially treated for cellulitis, which was than stopped. Currently she denies any chest pain, palpitations, dyspnea at rest, headaches, dizziness, or palpitations. She is c/o RT upper extremity swelling due to chronic lymphedema and dyspnea with exertion . Her daughter states they they have been monitoring her weights at home, and they have been stable. She also has visiting nurse coming to the house. She continues to experience severe right hip and leg pain > was offered hip replacement in the past , was unable to undergo surgery due to heart problems

## 2021-01-18 NOTE — PLAN
[FreeTextEntry1] : 1. see plan\par 2. CAD/ BHAT\par cardio follow up\par 3. RLE lymphedema\par vascular follow up\par 4. RT hip pain\par ortho follow up

## 2021-01-19 ENCOUNTER — APPOINTMENT (OUTPATIENT)
Dept: ORTHOPEDIC SURGERY | Facility: CLINIC | Age: 81
End: 2021-01-19
Payer: MEDICARE

## 2021-01-19 VITALS — HEIGHT: 60 IN | WEIGHT: 194 LBS | BODY MASS INDEX: 38.09 KG/M2

## 2021-01-19 LAB
ALBUMIN SERPL ELPH-MCNC: 3.7 G/DL
ALP BLD-CCNC: 82 U/L
ALT SERPL-CCNC: 11 U/L
ANION GAP SERPL CALC-SCNC: 13 MMOL/L
AST SERPL-CCNC: 15 U/L
BASOPHILS # BLD AUTO: 0.03 K/UL
BASOPHILS NFR BLD AUTO: 0.4 %
BILIRUB SERPL-MCNC: <0.2 MG/DL
BUN SERPL-MCNC: 34 MG/DL
CALCIUM SERPL-MCNC: 9.7 MG/DL
CHLORIDE SERPL-SCNC: 106 MMOL/L
CO2 SERPL-SCNC: 22 MMOL/L
CREAT SERPL-MCNC: 1.59 MG/DL
EOSINOPHIL # BLD AUTO: 0.11 K/UL
EOSINOPHIL NFR BLD AUTO: 1.4 %
ESTIMATED AVERAGE GLUCOSE: 157 MG/DL
GLUCOSE SERPL-MCNC: 170 MG/DL
HBA1C MFR BLD HPLC: 7.1 %
HCT VFR BLD CALC: 36.7 %
HGB BLD-MCNC: 10.7 G/DL
IMM GRANULOCYTES NFR BLD AUTO: 0.3 %
LYMPHOCYTES # BLD AUTO: 1.21 K/UL
LYMPHOCYTES NFR BLD AUTO: 15.2 %
MAN DIFF?: NORMAL
MCHC RBC-ENTMCNC: 26.7 PG
MCHC RBC-ENTMCNC: 29.2 GM/DL
MCV RBC AUTO: 91.5 FL
MONOCYTES # BLD AUTO: 0.74 K/UL
MONOCYTES NFR BLD AUTO: 9.3 %
NEUTROPHILS # BLD AUTO: 5.84 K/UL
NEUTROPHILS NFR BLD AUTO: 73.4 %
PLATELET # BLD AUTO: 363 K/UL
POTASSIUM SERPL-SCNC: 4.8 MMOL/L
PROT SERPL-MCNC: 7.2 G/DL
RBC # BLD: 4.01 M/UL
RBC # FLD: 15.8 %
SODIUM SERPL-SCNC: 141 MMOL/L
WBC # FLD AUTO: 7.95 K/UL

## 2021-01-19 PROCEDURE — 72170 X-RAY EXAM OF PELVIS: CPT

## 2021-01-19 PROCEDURE — 99215 OFFICE O/P EST HI 40 MIN: CPT

## 2021-01-20 ENCOUNTER — NON-APPOINTMENT (OUTPATIENT)
Age: 81
End: 2021-01-20

## 2021-01-20 PROCEDURE — 82330 ASSAY OF CALCIUM: CPT

## 2021-01-20 PROCEDURE — 84295 ASSAY OF SERUM SODIUM: CPT

## 2021-01-20 PROCEDURE — 73502 X-RAY EXAM HIP UNI 2-3 VIEWS: CPT

## 2021-01-20 PROCEDURE — 93971 EXTREMITY STUDY: CPT

## 2021-01-20 PROCEDURE — 85018 HEMOGLOBIN: CPT

## 2021-01-20 PROCEDURE — 97110 THERAPEUTIC EXERCISES: CPT

## 2021-01-20 PROCEDURE — 99285 EMERGENCY DEPT VISIT HI MDM: CPT

## 2021-01-20 PROCEDURE — 87040 BLOOD CULTURE FOR BACTERIA: CPT

## 2021-01-20 PROCEDURE — 82947 ASSAY GLUCOSE BLOOD QUANT: CPT

## 2021-01-20 PROCEDURE — 83735 ASSAY OF MAGNESIUM: CPT

## 2021-01-20 PROCEDURE — 87635 SARS-COV-2 COVID-19 AMP PRB: CPT

## 2021-01-20 PROCEDURE — 73564 X-RAY EXAM KNEE 4 OR MORE: CPT

## 2021-01-20 PROCEDURE — 82962 GLUCOSE BLOOD TEST: CPT

## 2021-01-20 PROCEDURE — 93005 ELECTROCARDIOGRAM TRACING: CPT

## 2021-01-20 PROCEDURE — 84132 ASSAY OF SERUM POTASSIUM: CPT

## 2021-01-20 PROCEDURE — 87086 URINE CULTURE/COLONY COUNT: CPT

## 2021-01-20 PROCEDURE — 81001 URINALYSIS AUTO W/SCOPE: CPT

## 2021-01-20 PROCEDURE — 97162 PT EVAL MOD COMPLEX 30 MIN: CPT

## 2021-01-20 PROCEDURE — 80053 COMPREHEN METABOLIC PANEL: CPT

## 2021-01-20 PROCEDURE — 82435 ASSAY OF BLOOD CHLORIDE: CPT

## 2021-01-20 PROCEDURE — U0005: CPT

## 2021-01-20 PROCEDURE — 85025 COMPLETE CBC W/AUTO DIFF WBC: CPT

## 2021-01-20 PROCEDURE — 97116 GAIT TRAINING THERAPY: CPT

## 2021-01-20 PROCEDURE — 83605 ASSAY OF LACTIC ACID: CPT

## 2021-01-20 PROCEDURE — 83036 HEMOGLOBIN GLYCOSYLATED A1C: CPT

## 2021-01-20 PROCEDURE — 82803 BLOOD GASES ANY COMBINATION: CPT

## 2021-01-20 PROCEDURE — 71045 X-RAY EXAM CHEST 1 VIEW: CPT

## 2021-01-20 PROCEDURE — 86769 SARS-COV-2 COVID-19 ANTIBODY: CPT

## 2021-01-20 PROCEDURE — 84100 ASSAY OF PHOSPHORUS: CPT

## 2021-01-20 PROCEDURE — 85027 COMPLETE CBC AUTOMATED: CPT

## 2021-01-20 PROCEDURE — 85730 THROMBOPLASTIN TIME PARTIAL: CPT

## 2021-01-20 PROCEDURE — 80048 BASIC METABOLIC PNL TOTAL CA: CPT

## 2021-01-20 PROCEDURE — 85014 HEMATOCRIT: CPT

## 2021-01-20 PROCEDURE — 85610 PROTHROMBIN TIME: CPT

## 2021-01-22 ENCOUNTER — LABORATORY RESULT (OUTPATIENT)
Age: 81
End: 2021-01-22

## 2021-01-26 ENCOUNTER — LABORATORY RESULT (OUTPATIENT)
Age: 81
End: 2021-01-26

## 2021-01-29 ENCOUNTER — APPOINTMENT (OUTPATIENT)
Dept: INTERNAL MEDICINE | Facility: CLINIC | Age: 81
End: 2021-01-29
Payer: MEDICARE

## 2021-01-29 DIAGNOSIS — I89.0 LYMPHEDEMA, NOT ELSEWHERE CLASSIFIED: ICD-10-CM

## 2021-01-29 PROCEDURE — 99214 OFFICE O/P EST MOD 30 MIN: CPT | Mod: 95

## 2021-01-29 NOTE — PLAN
[FreeTextEntry1] : Uncontrolled diabetes due to mild dehydration, recommend avoid metolazone, continue bumetanide as prescribed by Dr. Hagan.  Continue usual care, drink an extra glass of water daily.\par Plan for optimizing care, for right total hip replacement scheduled in March.

## 2021-01-29 NOTE — HISTORY OF PRESENT ILLNESS
[Home] : at home, [unfilled] , at the time of the visit. [Other Location: e.g. Home (Enter Location, City,State)___] : at [unfilled] [Family Member] : family member [Verbal consent obtained from patient] : the patient, [unfilled] [de-identified] : Patient with raising the right upper extremity right lower extremity swelling.  Denies any fever chills redness chest pain shortness of breath.  Status post CABG x2 in November.  Her medications reconciled.  Records reviewed with patient's daughter, Shauna

## 2021-01-29 NOTE — REVIEW OF SYSTEMS
[Recent Change In Weight] : ~T recent weight change [Leg Claudication] : leg claudication [Lower Ext Edema] : lower extremity edema [Paroxysmal Nocturnal Dyspnea] : paroxysmal nocturnal dyspnea [Incontinence] : incontinence [Nocturia] : nocturia [Joint Pain] : joint pain [Joint Stiffness] : joint stiffness [Joint Swelling] : joint swelling [Back Pain] : back pain [Unsteady Walking] : ataxia [Anxiety] : anxiety [Negative] : Heme/Lymph [Fever] : no fever [Chills] : no chills [Fatigue] : no fatigue [Hot Flashes] : no hot flashes [Night Sweats] : no night sweats [Chest Pain] : no chest pain [Palpitations] : no palpitations [Orthopnea] : no orthopnea [Dysuria] : no dysuria [Hematuria] : no hematuria [Frequency] : no frequency [Vaginal Discharge] : no vaginal discharge [Muscle Weakness] : no muscle weakness [Muscle Pain] : no muscle pain [Suicidal] : not suicidal [Insomnia] : no insomnia [Depression] : no depression [FreeTextEntry2] : SEE HPI [FreeTextEntry9] : RIGHT HIP, RT KNEE,LEFT WRIST

## 2021-01-29 NOTE — PHYSICAL EXAM
[No Acute Distress] : no acute distress [de-identified] : Observational [de-identified] : Nonpitting swelling of the right upper extremity right lower extremity, with chronic stasis changes.  No erythema or weeping

## 2021-01-29 NOTE — COUNSELING
[Transportation Issues] : Transportation issues [Good understanding] : Patient has a good understanding of lifestyle changes and steps needed to achieve self management goal

## 2021-02-04 ENCOUNTER — NON-APPOINTMENT (OUTPATIENT)
Age: 81
End: 2021-02-04

## 2021-02-04 ENCOUNTER — INPATIENT (INPATIENT)
Facility: HOSPITAL | Age: 81
LOS: 3 days | Discharge: HOME | End: 2021-02-08
Attending: HOSPITALIST | Admitting: HOSPITALIST
Payer: MEDICARE

## 2021-02-04 VITALS
TEMPERATURE: 99 F | SYSTOLIC BLOOD PRESSURE: 168 MMHG | HEIGHT: 60 IN | RESPIRATION RATE: 16 BRPM | DIASTOLIC BLOOD PRESSURE: 71 MMHG | HEART RATE: 81 BPM | OXYGEN SATURATION: 96 %

## 2021-02-04 DIAGNOSIS — N18.9 CHRONIC KIDNEY DISEASE, UNSPECIFIED: ICD-10-CM

## 2021-02-04 DIAGNOSIS — I10 ESSENTIAL (PRIMARY) HYPERTENSION: ICD-10-CM

## 2021-02-04 DIAGNOSIS — I82.409 ACUTE EMBOLISM AND THROMBOSIS OF UNSPECIFIED DEEP VEINS OF UNSPECIFIED LOWER EXTREMITY: ICD-10-CM

## 2021-02-04 DIAGNOSIS — R82.71 BACTERIURIA: ICD-10-CM

## 2021-02-04 DIAGNOSIS — I50.9 HEART FAILURE, UNSPECIFIED: ICD-10-CM

## 2021-02-04 DIAGNOSIS — E11.22 TYPE 2 DIABETES MELLITUS WITH DIABETIC CHRONIC KIDNEY DISEASE: ICD-10-CM

## 2021-02-04 DIAGNOSIS — Z90.11 ACQUIRED ABSENCE OF RIGHT BREAST AND NIPPLE: Chronic | ICD-10-CM

## 2021-02-04 DIAGNOSIS — Z29.9 ENCOUNTER FOR PROPHYLACTIC MEASURES, UNSPECIFIED: ICD-10-CM

## 2021-02-04 DIAGNOSIS — Z98.89 OTHER SPECIFIED POSTPROCEDURAL STATES: Chronic | ICD-10-CM

## 2021-02-04 DIAGNOSIS — L03.115 CELLULITIS OF RIGHT LOWER LIMB: ICD-10-CM

## 2021-02-04 DIAGNOSIS — Z96.651 PRESENCE OF RIGHT ARTIFICIAL KNEE JOINT: Chronic | ICD-10-CM

## 2021-02-04 DIAGNOSIS — Z93.2 ILEOSTOMY STATUS: Chronic | ICD-10-CM

## 2021-02-04 DIAGNOSIS — Z90.49 ACQUIRED ABSENCE OF OTHER SPECIFIED PARTS OF DIGESTIVE TRACT: Chronic | ICD-10-CM

## 2021-02-04 DIAGNOSIS — R44.3 HALLUCINATIONS, UNSPECIFIED: ICD-10-CM

## 2021-02-04 LAB
ALBUMIN SERPL ELPH-MCNC: 4 G/DL — SIGNIFICANT CHANGE UP (ref 3.3–5)
ALP SERPL-CCNC: 100 U/L — SIGNIFICANT CHANGE UP (ref 40–120)
ALT FLD-CCNC: 10 U/L — SIGNIFICANT CHANGE UP (ref 4–33)
ANION GAP SERPL CALC-SCNC: 13 MMOL/L — SIGNIFICANT CHANGE UP (ref 7–14)
APPEARANCE UR: CLEAR — SIGNIFICANT CHANGE UP
APTT BLD: 26.4 SEC — LOW (ref 27–36.3)
AST SERPL-CCNC: 14 U/L — SIGNIFICANT CHANGE UP (ref 4–32)
BASOPHILS # BLD AUTO: 0.03 K/UL — SIGNIFICANT CHANGE UP (ref 0–0.2)
BASOPHILS NFR BLD AUTO: 0.4 % — SIGNIFICANT CHANGE UP (ref 0–2)
BILIRUB SERPL-MCNC: 0.3 MG/DL — SIGNIFICANT CHANGE UP (ref 0.2–1.2)
BILIRUB UR-MCNC: NEGATIVE — SIGNIFICANT CHANGE UP
BUN SERPL-MCNC: 24 MG/DL — HIGH (ref 7–23)
CALCIUM SERPL-MCNC: 9.3 MG/DL — SIGNIFICANT CHANGE UP (ref 8.4–10.5)
CHLORIDE SERPL-SCNC: 102 MMOL/L — SIGNIFICANT CHANGE UP (ref 98–107)
CO2 SERPL-SCNC: 24 MMOL/L — SIGNIFICANT CHANGE UP (ref 22–31)
COLOR SPEC: SIGNIFICANT CHANGE UP
CREAT SERPL-MCNC: 1.56 MG/DL — HIGH (ref 0.5–1.3)
DIFF PNL FLD: NEGATIVE — SIGNIFICANT CHANGE UP
EOSINOPHIL # BLD AUTO: 0.16 K/UL — SIGNIFICANT CHANGE UP (ref 0–0.5)
EOSINOPHIL NFR BLD AUTO: 2 % — SIGNIFICANT CHANGE UP (ref 0–6)
GLUCOSE SERPL-MCNC: 164 MG/DL — HIGH (ref 70–99)
GLUCOSE UR QL: NEGATIVE — SIGNIFICANT CHANGE UP
HCT VFR BLD CALC: 36 % — SIGNIFICANT CHANGE UP (ref 34.5–45)
HGB BLD-MCNC: 10.1 G/DL — LOW (ref 11.5–15.5)
IANC: 5.54 K/UL — SIGNIFICANT CHANGE UP (ref 1.5–8.5)
IMM GRANULOCYTES NFR BLD AUTO: 0.4 % — SIGNIFICANT CHANGE UP (ref 0–1.5)
INR BLD: 1.12 RATIO — SIGNIFICANT CHANGE UP (ref 0.88–1.16)
KETONES UR-MCNC: NEGATIVE — SIGNIFICANT CHANGE UP
LEUKOCYTE ESTERASE UR-ACNC: NEGATIVE — SIGNIFICANT CHANGE UP
LYMPHOCYTES # BLD AUTO: 1.42 K/UL — SIGNIFICANT CHANGE UP (ref 1–3.3)
LYMPHOCYTES # BLD AUTO: 18 % — SIGNIFICANT CHANGE UP (ref 13–44)
MAGNESIUM SERPL-MCNC: 2.2 MG/DL — SIGNIFICANT CHANGE UP (ref 1.6–2.6)
MCHC RBC-ENTMCNC: 24.6 PG — LOW (ref 27–34)
MCHC RBC-ENTMCNC: 28.1 GM/DL — LOW (ref 32–36)
MCV RBC AUTO: 87.8 FL — SIGNIFICANT CHANGE UP (ref 80–100)
MONOCYTES # BLD AUTO: 0.71 K/UL — SIGNIFICANT CHANGE UP (ref 0–0.9)
MONOCYTES NFR BLD AUTO: 9 % — SIGNIFICANT CHANGE UP (ref 2–14)
NEUTROPHILS # BLD AUTO: 5.54 K/UL — SIGNIFICANT CHANGE UP (ref 1.8–7.4)
NEUTROPHILS NFR BLD AUTO: 70.2 % — SIGNIFICANT CHANGE UP (ref 43–77)
NITRITE UR-MCNC: NEGATIVE — SIGNIFICANT CHANGE UP
NRBC # BLD: 0 /100 WBCS — SIGNIFICANT CHANGE UP
NRBC # FLD: 0 K/UL — SIGNIFICANT CHANGE UP
NT-PROBNP SERPL-SCNC: 1850 PG/ML — HIGH
PH UR: 7.5 — SIGNIFICANT CHANGE UP (ref 5–8)
PLATELET # BLD AUTO: 417 K/UL — HIGH (ref 150–400)
POTASSIUM SERPL-MCNC: 4.2 MMOL/L — SIGNIFICANT CHANGE UP (ref 3.5–5.3)
POTASSIUM SERPL-SCNC: 4.2 MMOL/L — SIGNIFICANT CHANGE UP (ref 3.5–5.3)
PROT SERPL-MCNC: 7.5 G/DL — SIGNIFICANT CHANGE UP (ref 6–8.3)
PROT UR-MCNC: ABNORMAL
PROTHROM AB SERPL-ACNC: 12.8 SEC — SIGNIFICANT CHANGE UP (ref 10.6–13.6)
RBC # BLD: 4.1 M/UL — SIGNIFICANT CHANGE UP (ref 3.8–5.2)
RBC # FLD: 16.7 % — HIGH (ref 10.3–14.5)
SODIUM SERPL-SCNC: 139 MMOL/L — SIGNIFICANT CHANGE UP (ref 135–145)
SP GR SPEC: 1.01 — SIGNIFICANT CHANGE UP (ref 1.01–1.02)
TOXICOLOGY SCREEN, DRUGS OF ABUSE, SERUM RESULT: SIGNIFICANT CHANGE UP
UROBILINOGEN FLD QL: SIGNIFICANT CHANGE UP
WBC # BLD: 7.89 K/UL — SIGNIFICANT CHANGE UP (ref 3.8–10.5)
WBC # FLD AUTO: 7.89 K/UL — SIGNIFICANT CHANGE UP (ref 3.8–10.5)

## 2021-02-04 PROCEDURE — 99223 1ST HOSP IP/OBS HIGH 75: CPT | Mod: GC

## 2021-02-04 PROCEDURE — 73590 X-RAY EXAM OF LOWER LEG: CPT | Mod: 26,RT

## 2021-02-04 PROCEDURE — 99285 EMERGENCY DEPT VISIT HI MDM: CPT

## 2021-02-04 PROCEDURE — 93970 EXTREMITY STUDY: CPT | Mod: 26

## 2021-02-04 PROCEDURE — 71045 X-RAY EXAM CHEST 1 VIEW: CPT | Mod: 26

## 2021-02-04 RX ORDER — DEXTROSE 50 % IN WATER 50 %
25 SYRINGE (ML) INTRAVENOUS ONCE
Refills: 0 | Status: DISCONTINUED | OUTPATIENT
Start: 2021-02-04 | End: 2021-02-08

## 2021-02-04 RX ORDER — ASPIRIN/CALCIUM CARB/MAGNESIUM 324 MG
81 TABLET ORAL DAILY
Refills: 0 | Status: DISCONTINUED | OUTPATIENT
Start: 2021-02-04 | End: 2021-02-08

## 2021-02-04 RX ORDER — INSULIN LISPRO 100/ML
VIAL (ML) SUBCUTANEOUS
Refills: 0 | Status: DISCONTINUED | OUTPATIENT
Start: 2021-02-04 | End: 2021-02-08

## 2021-02-04 RX ORDER — METOPROLOL TARTRATE 50 MG
150 TABLET ORAL DAILY
Refills: 0 | Status: DISCONTINUED | OUTPATIENT
Start: 2021-02-05 | End: 2021-02-08

## 2021-02-04 RX ORDER — GLUCAGON INJECTION, SOLUTION 0.5 MG/.1ML
1 INJECTION, SOLUTION SUBCUTANEOUS ONCE
Refills: 0 | Status: DISCONTINUED | OUTPATIENT
Start: 2021-02-04 | End: 2021-02-08

## 2021-02-04 RX ORDER — SODIUM CHLORIDE 9 MG/ML
1000 INJECTION, SOLUTION INTRAVENOUS
Refills: 0 | Status: DISCONTINUED | OUTPATIENT
Start: 2021-02-04 | End: 2021-02-08

## 2021-02-04 RX ORDER — AMLODIPINE BESYLATE 2.5 MG/1
5 TABLET ORAL DAILY
Refills: 0 | Status: DISCONTINUED | OUTPATIENT
Start: 2021-02-04 | End: 2021-02-08

## 2021-02-04 RX ORDER — CEFAZOLIN SODIUM 1 G
500 VIAL (EA) INJECTION EVERY 8 HOURS
Refills: 0 | Status: DISCONTINUED | OUTPATIENT
Start: 2021-02-04 | End: 2021-02-05

## 2021-02-04 RX ORDER — DEXTROSE 50 % IN WATER 50 %
12.5 SYRINGE (ML) INTRAVENOUS ONCE
Refills: 0 | Status: DISCONTINUED | OUTPATIENT
Start: 2021-02-04 | End: 2021-02-08

## 2021-02-04 RX ORDER — APIXABAN 2.5 MG/1
2.5 TABLET, FILM COATED ORAL EVERY 12 HOURS
Refills: 0 | Status: DISCONTINUED | OUTPATIENT
Start: 2021-02-04 | End: 2021-02-05

## 2021-02-04 RX ORDER — BUMETANIDE 0.25 MG/ML
1 INJECTION INTRAMUSCULAR; INTRAVENOUS ONCE
Refills: 0 | Status: COMPLETED | OUTPATIENT
Start: 2021-02-04 | End: 2021-02-04

## 2021-02-04 RX ORDER — DEXTROSE 50 % IN WATER 50 %
15 SYRINGE (ML) INTRAVENOUS ONCE
Refills: 0 | Status: DISCONTINUED | OUTPATIENT
Start: 2021-02-04 | End: 2021-02-08

## 2021-02-04 RX ORDER — BUMETANIDE 0.25 MG/ML
0.5 INJECTION INTRAMUSCULAR; INTRAVENOUS DAILY
Refills: 0 | Status: DISCONTINUED | OUTPATIENT
Start: 2021-02-05 | End: 2021-02-08

## 2021-02-04 RX ORDER — ATORVASTATIN CALCIUM 80 MG/1
40 TABLET, FILM COATED ORAL AT BEDTIME
Refills: 0 | Status: DISCONTINUED | OUTPATIENT
Start: 2021-02-04 | End: 2021-02-08

## 2021-02-04 RX ORDER — FAMOTIDINE 10 MG/ML
40 INJECTION INTRAVENOUS DAILY
Refills: 0 | Status: DISCONTINUED | OUTPATIENT
Start: 2021-02-04 | End: 2021-02-08

## 2021-02-04 RX ADMIN — BUMETANIDE 1 MILLIGRAM(S): 0.25 INJECTION INTRAMUSCULAR; INTRAVENOUS at 21:19

## 2021-02-04 RX ADMIN — Medication 100 MILLIGRAM(S): at 21:20

## 2021-02-04 NOTE — ED ADULT NURSE NOTE - CHIEF COMPLAINT QUOTE
Pt Frisian speaking, brought in by EMS from home hallucinations at night and increased BLE swelling, pitting edema. R. arm lymphedema noted, baseline per EMS. Pt with no complaints, denies SOB, any pain. Calm in triage. PMHx CABG, CHF, htn, hld, diabetes, DVT, R. arm lymphedema, R. mastectomy    Daughter (Shauna) - [phone]

## 2021-02-04 NOTE — H&P ADULT - NSHPSOCIALHISTORY_GEN_ALL_CORE
Patient lives alone but daughter lives down the street to help. No history of smoking, EtOH use, or illicit drug use.

## 2021-02-04 NOTE — H&P ADULT - PROBLEM SELECTOR PLAN 8
- UA with bacteria, no nitrites or leuk est -UA with bacteria, no nitrites or leuk est  -Patient denies urinary symptoms  -C/w abx for cellulitis as above, no need to specifically treat for UTI

## 2021-02-04 NOTE — H&P ADULT - NSHPREVIEWOFSYSTEMS_GEN_ALL_CORE
Constitutional: no weight change, no fever, no chills, no night sweats, no fatigue  ENT/mouth: no hearing changes, no sore throat, no rhinorrhea  Eyes: no eye pain, no eye redness, no eye swelling, no vision changes  CV: no chest pain, no orthopnea, no palpitations  Resp: + cough, no shortness of breath, no wheezing  GI: no abdominal pain, no nausea, no vomiting, no diarrhea, no constipation  : no dysuria, no urinary frequency or hesitancy, no hematuria  Skin/MSK: + b/l lower extremity edema R>L, + pain, no rashes,   Neuro: no weakness, no numbness, no loss of consciousness, no syncope, no dizziness, no headache Constitutional: no weight change, no fever, no chills, no night sweats, no fatigue  ENT/mouth: no hearing changes, no sore throat, no rhinorrhea  Eyes: no eye pain, no eye redness, no eye swelling, no vision changes  CV: no chest pain, no orthopnea, no palpitations  Resp: + cough, no shortness of breath, no wheezing  GI: no abdominal pain, no nausea, no vomiting, no diarrhea, no constipation  : no dysuria, no urinary frequency or hesitancy, no hematuria  Skin/MSK: + b/l lower extremity edema R>L, + pain, no rashes,   Neuro: no weakness, no numbness, no loss of consciousness, no syncope, no dizziness, no headache    Constitutional: No generalized weakness, fevers, chills, or weight loss  Eyes: No visual changes, double vision, or eye pain  Ears, Nose, Mouth, Throat: No runny nose, sinus pain, ear pain, tinnitus, sore throat, dysphagia, or odynophagia  Cardiovascular: No chest pain or palpitations. B/l LE swelling, R > L.  Respiratory: +Mild cough. No wheezing, hemoptysis, or shortness of breath.  Gastrointestinal: No abdominal pain, nausea/vomiting, diarrhea/constipation, hematemesis, melena, or BRBPR  Genitourinary: No dysuria, frequency, urgency, or hematuria  Musculoskeletal: +RLE swelling and redness. No joint pain, joint swelling, or decreased ROM.  No back pain.  Skin: +RLE redness. No pruritus or rashes.  Neurologic: No seizures, headache, paresthesias, numbness, or limb weakness  Psychiatric: +Visual and auditory hallucinations. No depression, anxiety, or agitation.  Endocrine: No heat/cold intolerance, mood swings, sweats, polydipsia, or polyuria  Hematologic/lymphatic: No purpura, petechia, or prolonged or excessive bleeding after dental extraction / injury    Positives and pertinent negatives noted and all other systems negative. Constitutional: No generalized weakness, fevers, chills, or weight loss  Eyes: No visual changes, double vision, or eye pain  Ears, Nose, Mouth, Throat: No runny nose, sinus pain, ear pain, tinnitus, sore throat, dysphagia, or odynophagia  Cardiovascular: No chest pain or palpitations. B/l LE swelling, R > L.  Respiratory: +Mild cough. No wheezing, hemoptysis, or shortness of breath.  Gastrointestinal: No abdominal pain, nausea/vomiting, diarrhea/constipation, hematemesis, melena, or BRBPR  Genitourinary: No dysuria, frequency, urgency, or hematuria  Musculoskeletal: +RLE swelling and redness. No joint pain, joint swelling, or decreased ROM.  No back pain.  Skin: +RLE redness. No pruritus or rashes.  Neurologic: No seizures, headache, paresthesias, numbness, or limb weakness  Psychiatric: +Visual and auditory hallucinations. No depression, anxiety, or agitation.  Endocrine: No heat/cold intolerance, mood swings, sweats, polydipsia, or polyuria  Hematologic/lymphatic: No purpura, petechia, or prolonged or excessive bleeding after dental extraction / injury    Positives and pertinent negatives noted and all other systems negative.

## 2021-02-04 NOTE — H&P ADULT - PROBLEM SELECTOR PLAN 7
Molly RLE DVT 2019, on eliquis  -F/u LE dopplers  -C/w home eliquis Molly RLE DVT 2019, on Eliquis  -F/u LE dopplers  -C/w home Eliquis

## 2021-02-04 NOTE — ED PROCEDURE NOTE - ATTENDING CONTRIBUTION TO CARE
I have participated in and supervised all key portions of the above procedures and agree with the above documentation. - Greg MAC

## 2021-02-04 NOTE — ED ADULT NURSE NOTE - OBJECTIVE STATEMENT
RN facilitator  received pt in bed A and OX 3  in NAD resting comfortably, pt c/o B/l lower extremity swelling, denies having any other complaints, noted B/l pedal edema +2, right arm edema. pt with shallow  rapid breathing, communicates wit full and complete sentences, lung sounds with expiratory wheezing in all fields B/l. hx obtained using  with MD, ( see MD note for  #). pt is on monitor with VSS, NSR, 2 NC at 2 LPM tolerating well, Labs drawn and sent, unable to initiate IV due to difficulty, resident MD at bedside for Sono IV placement.

## 2021-02-04 NOTE — H&P ADULT - HISTORY OF PRESENT ILLNESS
80 year Namibian speaking old female with hx of CHF, CAD s/p CABG in Nov 2020, R breast cancer (1980s) s/p mastectomy with chronic RUE lymphedema, colon cancer s/p resection, rectal cancer s/p chemo and radiation, HTN, HLD, T2DM, DVT on eliquis, renal insufficiency, and obesity, presenting with complaint of right leg swelling and redness.  80 year Martiniquais speaking old female with hx of HFpEF, CAD s/p CABG in Nov 2020, R breast cancer (1980s) s/p mastectomy with chronic RUE lymphedema, colon cancer s/p resection, rectal cancer (2015) s/p chemo and radiation, HTN, HLD, T2DM, DVT on eliquis, renal insufficiency, and obesity, presenting with complaint of right leg swelling and redness. Patient was recently admitted 1/1-1/4 for RLE redness/swelling, was treated for cellulitis with vancomycin. Now she has been having worsening right lower extremity redness and swelling over the past 1-2 days. Her left leg has been more swollen than usual as well, but not to the extent of the right leg. She has also had associated leg pain and tenderness bilaterally. Additionally, she has been having visual and auditory hallucinations for the past 2-3 days. She has had multiple episodes at night where she sees/hears cats playing insider her house, and sees/hears large groups of people playing. She understands that her house is empty but the hallucinations "feel real." She has apparently had a similar episode after receiving anesthesia in the past. She has had a mild cough. She denies any SOB, CP, fever, chills, weight change, orthopnea, palpitations, abd pain, nausea, vomiting, changes in BH, or urinary sx.    In the ED, patient afebrile with HR in 80s, -170/60-70, SpO2 95% on room air. Labs significant for WBC 7.8, Hgb 10.2 (baseline ~10), ESR 47, CRP 7, BUN 24, Cr 1.56 (baseline ~1.4), BNP 1850. Utox negative. UA with bacteria, no nitrites or leuk est. CXR with cardiomegaly and b/l linear opacities c/w pulm edema. Right leg xrays with normal arthroplasty and no evidence od erosions. Patient was given bumesx 1mg x1, clindamycin 600mg IV x1.  80 year old Turkish speaking female with hx of HFpEF, CAD s/p CABG in Nov 2020, R breast cancer (1980s) s/p mastectomy with chronic RUE lymphedema, colon cancer s/p resection, rectal cancer (2015) s/p chemo and radiation, HTN, HLD, T2DM, DVT (2019) on eliquis, renal insufficiency, and obesity, presenting with complaint of right leg swelling and redness. Patient was recently admitted 1/1-1/4 for RLE redness/swelling, was treated for cellulitis with vancomycin. Now she has been having worsening right lower extremity redness and swelling over the past 1-2 days. Her left leg has been more swollen than usual as well, but not to the extent of the right leg. She has also had associated leg pain and tenderness bilaterally. Additionally, she has been having visual and auditory hallucinations for the past 2-3 days. She has had multiple episodes at night where she sees/hears cats playing insider her house, and sees/hears large groups of people playing. She understands that her house is empty but the hallucinations "feel real." She has apparently had a similar episode after receiving anesthesia in the past. She has had a mild cough. She denies any SOB, CP, fever, chills, weight change, orthopnea, palpitations, abd pain, nausea, vomiting, changes in BH, or urinary sx.    In the ED, patient afebrile with HR in 80s, -170/60-70, SpO2 95% on room air. Labs significant for WBC 7.8, Hgb 10.2 (baseline ~10), ESR 47, CRP 7, BUN 24, Cr 1.56 (baseline ~1.4), BNP 1850. Utox negative. UA with bacteria, no nitrites or leuk est. CXR with cardiomegaly and b/l linear opacities c/w pulm edema. Right leg xrays with normal arthroplasty and no evidence od erosions. Patient was given bumesx 1mg x1, clindamycin 600mg IV x1.  80 year old Maori speaking female with hx of HFpEF, CAD s/p CABG in Nov 2020, R breast cancer (1980s) s/p mastectomy with chronic RUE lymphedema, colon cancer s/p resection, rectal cancer (2015) s/p chemo and radiation, HTN, HLD, T2DM, DVT (2019) on eliquis, renal insufficiency, and obesity, presenting with complaint of right leg swelling and redness. Patient was recently admitted 1/1-1/4 for RLE redness/swelling, was treated for cellulitis with vancomycin. Now she has been having worsening right lower extremity redness and swelling over the past 1-2 days. Her left leg has been more swollen than usual as well, but not to the extent of the right leg. She has also had associated leg pain and tenderness bilaterally. Additionally, she has been having visual and auditory hallucinations for the past 2-3 days. She has had multiple episodes at night where she sees/hears cats playing insider her house, and sees/hears large groups of people playing. She understands that her house is empty but the hallucinations "feel real." She has apparently had a similar episode after receiving anesthesia in the past. She has had a mild cough. She denies any SOB, CP, fever, chills, weight change, orthopnea, palpitations, abd pain, nausea, vomiting, changes in BH, or urinary sx.    In the ED, patient afebrile with HR in 80s, -170/60-70, SpO2 95% on room air. Labs significant for WBC 7.8, Hgb 10.2 (baseline ~10), ESR 47, CRP 7, BUN 24, Cr 1.56 (baseline ~1.4), BNP 1850. Utox negative. UA with bacteria, no nitrites or leuk est. CXR with cardiomegaly and b/l linear opacities c/w pulm edema. Right leg xrays with normal arthroplasty and no evidence od erosions. Patient was given bumesx 1mg x1, clindamycin 600mg IV x1.     PCP Dr Dawkins  Cardio Dr Hagan

## 2021-02-04 NOTE — H&P ADULT - PROBLEM SELECTOR PLAN 4
--170/60-70  -C/w home metoprolol 150mg qd and amlodipine 5mg qd --170/60-70  -C/w home metoprolol 150mg qd and amlodipine 5mg qd with hold parameters

## 2021-02-04 NOTE — ED PROVIDER NOTE - OBJECTIVE STATEMENT
, [id number]  80 year French speaking old female with hx of CHF, CAD s/p CABG in Nov 2020, R breast cancer (1980s) s/p mastectomy with chronic RUE lymphedema, colon cancer s/p resection, rectal cancer s/p chemo and radiation, HTN, HLD, T2DM, DVT on eliquis, renal insufficiency, and obesity, presenting with complaint of bilateral leg swelling and shortness of breath, +Redness to RLE. Was having hallucinations last night but no longer today. no fevers, or coughing.

## 2021-02-04 NOTE — H&P ADULT - PROBLEM SELECTOR PLAN 1
Worsening right lower extremity redness and swelling over the past 1-2 days, aw/ tenderness, R>L. Redness and warmth of RLE on PE  -Right leg xrays with normal arthroplasty and no evidence od erosion Worsening right lower extremity redness and swelling over the past 1-2 days, aw/ tenderness, R>L. Redness and warmth of RLE on PE  -Right leg xrays with normal arthroplasty and no evidence of erosion  -S/p clindamycin in the ED  -C/w cefazolin for now  -F/u LE doppler studies  -F/u blood cultures Worsening right lower extremity redness and swelling over the past 1-2 days, aw/ tenderness, R>L. Redness and warmth of RLE on PE, no purulence  -Right leg xrays with normal arthroplasty and no evidence of erosion  -S/p clindamycin in the ED  -C/w cefazolin for now  -Hx penicillin allergy. Pt states she had facial swelling many years ago, cannot say how long ago, but also states she has had PCN since then without issue. Also received cephalosporin on prior admission without allergic reaction  -Monitor closely for allergic reaction while on cefazolin  -F/u LE doppler studies  -F/u blood cultures Worsening right lower extremity redness and swelling over the past 1-2 days, a/w tenderness, R>L. Redness and warmth of RLE on PE, no purulence.  -Right leg xrays with normal arthroplasty and no evidence of erosion or osteomyelitis  -S/p clindamycin in the ED  -C/w cefazolin for now given nonpurulent cellulitis, pt also not currently on chemo/RT  -Hx penicillin allergy. Pt states she had facial swelling many years ago, cannot say how long ago, but also states she has had PCN since then without issues. Also received cephalosporin on prior admission without allergic reaction.  -Monitor closely for allergic reaction while on cefazolin  -F/u LE doppler studies  -F/u blood cultures

## 2021-02-04 NOTE — ED PROVIDER NOTE - ATTENDING CONTRIBUTION TO CARE
Patient is an 80 year Panamanian speaking old female with hx of CHF, CAD s/p CABG in Nov 2020, R breast cancer (1980s) s/p mastectomy with chronic RUE lymphedema, colon cancer s/p resection, rectal cancer s/p chemo and radiation, HTN, HLD, T2DM, DVT on eliquis, renal insufficiency, and obesity, presenting     with RLE pain and redness.  Hospitalized from 1/1 - 1/4, has history of DVT in RLE on eliquis.    Daughter (Shauna) - [phone]    Per daughter, patient was hallucinating overnight and has swelling in her right leg, feels it is much worse. She states her left leg is also swollen. She states her mother felt congested.   She states her mother was seeing things in the house, people in the house, spying on her from the TV, cats in the house. Twice, patient had similar symptoms after anesthesia. It did happen in [facility], became disoriented in the hospital. Daughter states her mother called her at 5 AM this morning. EMS was called around 2 pm, she was improving. She takes eliquis but only 1/2 of the bumex (0.5 mg) once daily.   Walks with a rollater.     pcp: Dr. Luciano Dawkins Patient is an 80 year Mauritian speaking old female with hx of CHF, CAD s/p CABG in Nov 2020, R breast cancer (1980s) s/p mastectomy with chronic RUE lymphedema, colon cancer s/p resection, rectal cancer s/p chemo and radiation, HTN, HLD, T2DM, DVT on eliquis, renal insufficiency, and obesity, presenting with complaint of bilateral leg swelling and shortness of breath. I spoke to patient via [employer] , [id number]. She reports mild cough, no fevers, chills, nausea, vomiting, chest pain. She states she has abdominal swelling as well. She states she has been taking her medicine. She is oriented to place, provides a linear history but does not know month, year. I spoke to patient's daughter, Shauna,[phone]. Accordingly, Per daughter, patient was hallucinating overnight and has swelling in her right leg, feels it is much worse. She states her left leg is also swollen. She states her mother felt congested.   She states her mother was seeing things in the house, people in the house, spying on her from the TV, cats in the house. Twice, patient had similar symptoms after anesthesia. It did happen in [facility], became disoriented in the hospital. Daughter states her mother called her at 5 AM this morning. EMS was called around 2 pm, she was improving. She takes eliquis but only 1/2 of the bumex (0.5 mg) once daily.   Walks with a rollater.     When patient was asked, she states there were people but now they are gone. She reports she was taking an antibiotic for a UTI.   Chart review shows patient was admitted with RLE pain and redness, concerning for cellulitis from 1/1 - 1/4/21.   pcp: Dr. Luciano Dawkins    VS noted  Gen. no acute distress, Non toxic   HEENT: EOMI, mmm  Lungs: crackles  CVS: RRR   Abd; Soft non tender, distended, pitting edema to lower abdomen  Ext: b/l LE pitting edema with RLE with erythema, warmth, RUE lymphedema  Neuro: awake, alert, oriented to self, hospital, not year or month, non focal clear speech  a/p: shortness of breath, b/l LE edema - likely worsening CHF. Plan for Bumex, labs, CXR, ekg. Patient with RLE redness/ warmth - chronic venous stasis vs cellulitis. Given hallucinations - will check cultures, start Abx. Will check b/l LE doppler given hx of DVT though patient is on eliquis.   - Greg MAC

## 2021-02-04 NOTE — H&P ADULT - PROBLEM SELECTOR PLAN 6
Likely prerenal or decreased effective circulating volume i/s/o CHF exacerbation  -Cr 1.56, baselin ~1.4 from prior admissions  -diuresis as above  -Monitor BMP daily  -Avoid nephrotoxic meds Cr 1.56, baseline ~1.4 from prior admissions  -Diuresis as above  -Monitor BMP daily  -Avoid nephrotoxic meds  -Renally dose meds

## 2021-02-04 NOTE — ED PROVIDER NOTE - PHYSICAL EXAMINATION
GEN: well nourished, in no apparent distress.  HEAD: NCAT  HEENT: PERRL, Airway patent, EOMI, non-erythematous pharynx, no exudates, uvula midline, MMM, neck supple, no LAD, +JVD  LUNG: crackles diffusely, no adventitious sounds, no retractions, no nasal flaring  CV: RRR, no murmurs,   Abd: soft, NTND, no rebound or guarding, BS+ in all quadrants, no CVAT  MSK: WWP, Pulses 2+ in extremities, +LE edema   Neuro:  AAOx2,  CUEVAS without laterality  Skin: Warm and dry, RLE erythema and warmth to touch, RUE edematous without warmth to touch   Psych: normal mood and affect, no si

## 2021-02-04 NOTE — ED PROVIDER NOTE - NS ED ROS FT
Constitutional: no fevers, no chills.  Eyes: no visual changes.  Ears: no ear drainage, no ear pain.  Nose: no nasal congestion.  Mouth/Throat: no sore throat.  Cardiovascular: no chest pain.  Respiratory: +shortness of breath, no wheezing, no cough  Gastrointestinal: no nausea, no vomiting, no diarrhea, no abdominal pain.  MSK: no flank pain, no back pain. +LE edema and redness   Genitourinary: no dysuria, no hematuria.  Skin: no rashes.  Neuro: no headache

## 2021-02-04 NOTE — H&P ADULT - PROBLEM SELECTOR PROBLEM 6
Chronic kidney disease TAO (acute kidney injury) Stage 3 chronic kidney disease, unspecified whether stage 3a or 3b CKD

## 2021-02-04 NOTE — H&P ADULT - ASSESSMENT
80 year old Sinhala speaking female with hx of HFpEF, CAD s/p CABG in Nov 2020, R breast cancer (1980s) s/p mastectomy with chronic RUE lymphedema, colon cancer s/p resection, rectal cancer (2015) s/p chemo and radiation, HTN, HLD, T2DM, DVT (2019) on eliquis, renal insufficiency, and obesity, presenting with complaint of RLE swelling and redness and hallucinations x2-3 days, admitted for RLE cellulitis.     80 year old German speaking female with hx of HFpEF, CAD s/p CABG in Nov 2020, R breast cancer (1980s) s/p mastectomy with chronic RUE lymphedema, colon cancer s/p resection, rectal cancer (2015) s/p chemo and radiation, HTN, HLD, T2DM, DVT (2019) on eliquis, renal insufficiency, and obesity, presenting with complaint of RLE swelling and redness and hallucinations x2-3 days, admitted for RLE cellulitis and possible CHF exacerbation.

## 2021-02-04 NOTE — ED ADULT TRIAGE NOTE - CHIEF COMPLAINT QUOTE
Pt Kinyarwanda speaking, brought in by EMS from home hallucinations at night and increased BLE swelling, pitting edema. R. arm lymphedema noted, baseline per EMS. Pt with no complaints, denies SOB, any pain. Calm in triage. PMHx CABG, CHF, htn, hld, diabetes, DVT, R. arm lymphedema, R. mastectomy    Daughter (Shauna) - [phone]

## 2021-02-04 NOTE — H&P ADULT - PROBLEM SELECTOR PLAN 3
Hx HFpEF, p/w LE swelling, denies SOB  -CXR with cardiomegaly and b/l linear opacities c/w pulmonary edema  -BNP 1850  -Last echo 11/2020 with normal systolic function  -Patient was started on 0.5 mg Bumex PRN recently  -S/p 1mg bumex in ED, c/w 0.5mg qd for now  -Daily weights  -C/w home metoprolol 150mg qd Hx HFpEF, p/w LE swelling, denies SOB  -CXR with cardiomegaly and b/l linear opacities c/w pulmonary edema  -BNP 1850  -Last echo 11/2020 with normal systolic function  -Patient was started on 0.5 mg Bumex PRN recently  -S/p 1mg bumex in ED, c/w 0.5mg qd for now  -Daily weights  -Strict I&Os  -C/w home metoprolol 150mg qd

## 2021-02-04 NOTE — ED PROVIDER NOTE - CLINICAL SUMMARY MEDICAL DECISION MAKING FREE TEXT BOX
80 year Salvadorean speaking old female with hx of CHF, CAD s/p CABG in Nov 2020, R breast cancer (1980s) s/p mastectomy with chronic RUE lymphedema, colon cancer s/p resection, rectal cancer s/p chemo and radiation, HTN, HLD, T2DM, DVT on eliquis, renal insufficiency, and obesity, presenting with complaint of bilateral leg swelling and shortness of breath. likely chf exacerbation and cellulitis of LE, check US LE to r/o dvt

## 2021-02-04 NOTE — H&P ADULT - PROBLEM SELECTOR PLAN 5
-HgbA1c 7.2% 1/2021  -Hold home oral meds  -C/w ISS for now  -Check sugars qAC and qhs  -adjust insulin as needed -HgbA1c 7.2% 1/2021  -Hold home oral meds  -C/w low-dose ISS qAC and qhs for now  -Check sugars qAC and qhs  -Adjust insulin regimen as needed

## 2021-02-04 NOTE — H&P ADULT - PROBLEM SELECTOR PLAN 9
CC/DASH diet  DVT PPx: on eliquis  Full code    Alexander Maria MD, PGY-2 Resident  Department of Internal Medicine  Pager: [phone] (NS) / [phone] (VIKA)  Email: [email or web address]/DASH diet  DVT PPx: on Eliquis  Full code  PT annette Maria MD, PGY-2 Resident  Department of Internal Medicine  Pager: [phone] (NS) / [phone] (VIKA)  Email: [email or web address]

## 2021-02-04 NOTE — H&P ADULT - NSHPLABSRESULTS_GEN_ALL_CORE
10.1   7.89  )-----------( 417      ( 2021 21:26 )             36.0       02-04    139  |  102  |  24<H>  ----------------------------<  164<H>  4.2   |  24  |  1.56<H>    Ca    9.3      2021 21:26  Mg     2.2     02-04    TPro  7.5  /  Alb  4.0  /  TBili  0.3  /  DBili  x   /  AST  14  /  ALT  10  /  AlkPhos  100  02-04              Urinalysis Basic - ( 2021 21:26 )    Color: Light Yellow / Appearance: Clear / S.013 / pH: x  Gluc: x / Ketone: Negative  / Bili: Negative / Urobili: <2 mg/dL   Blood: x / Protein: 30 mg/dL / Nitrite: Negative   Leuk Esterase: Negative / RBC: 6 /HPF / WBC 5 /HPF   Sq Epi: x / Non Sq Epi: 2 /HPF / Bacteria: Many        PT/INR - ( 2021 21:26 )   PT: 12.8 sec;   INR: 1.12 ratio         PTT - ( 2021 21:26 )  PTT:26.4 sec    Lactate Trend            CAPILLARY BLOOD GLUCOSE      POCT Blood Glucose.: 177 mg/dL (2021 16:21) 10.1   7.89  )-----------( 417      ( 2021 21:26 )             36.0       02-04    139  |  102  |  24<H>  ----------------------------<  164<H>  4.2   |  24  |  1.56<H>    Ca    9.3      2021 21:26  Mg     2.2     02-04    TPro  7.5  /  Alb  4.0  /  TBili  0.3  /  DBili  x   /  AST  14  /  ALT  10  /  AlkPhos  100  02-04        Urinalysis Basic - ( 2021 21:26 )    Color: Light Yellow / Appearance: Clear / S.013 / pH: x  Gluc: x / Ketone: Negative  / Bili: Negative / Urobili: <2 mg/dL   Blood: x / Protein: 30 mg/dL / Nitrite: Negative   Leuk Esterase: Negative / RBC: 6 /HPF / WBC 5 /HPF   Sq Epi: x / Non Sq Epi: 2 /HPF / Bacteria: Many    PT/INR - ( 2021 21:26 )   PT: 12.8 sec;   INR: 1.12 ratio      PTT - ( 2021 21:26 )  PTT:26.4 sec    Lactate Trend    CAPILLARY BLOOD GLUCOSE    POCT Blood Glucose.: 177 mg/dL (2021 16:21)    Imaging personally reviewed.  XRAY of RLE:  FINDINGS:    Status post right knee total arthroplasty; no periprosthetic lucency or fracture. Subcutaneous surgical staples posteromedially in the proximal calf. Diffuse soft tissue swelling without focal prominence. No cortical erosion, periosteal reaction or subcutaneous gas. No acute fracture or dislocation. Joint spaces are well-preserved.    IMPRESSION:  No radiographic evidence of advanced osteomyelitis..

## 2021-02-04 NOTE — H&P ADULT - NSHPPHYSICALEXAM_GEN_ALL_CORE
Gen: no acute distress  HEENT: atraumatic, normocephalic, pupils equally round and reactive to light, extraocular muscles intact, no conjunctival injection  CV: regular rate and rhythym, normal S1/S2, no murmurs, rubs, or gallops  Resp: lungs clear to auscultation bilaterally, no rales, rhonchi, or wheezes  GI: soft, nontender, nondistended, BSx4  MSK: 1+ pitting LE edema b/l  Skin: RLE warm/red  Neuro: no focal deficits, sensation grossly intact  Psych: alert and oriented x3, appropriate mood and affect Vital Signs Last 24 Hrs  T(C): 36.8 (05 Feb 2021 07:21), Max: 37.2 (04 Feb 2021 16:13)  T(F): 98.2 (05 Feb 2021 07:21), Max: 98.9 (04 Feb 2021 16:13)  HR: 87 (05 Feb 2021 07:21) (77 - 87)  BP: 151/62 (05 Feb 2021 07:21) (143/56 - 168/71)  BP(mean): --  RR: 18 (05 Feb 2021 07:21) (16 - 18)  SpO2: 97% (05 Feb 2021 07:21) (95% - 98%)    Gen: no acute distress  HEENT: atraumatic, normocephalic, pupils equally round and reactive to light, extraocular muscles intact, no conjunctival injection  CV: regular rate and rhythym, normal S1/S2, no murmurs, rubs, or gallops  Resp: lungs clear to auscultation bilaterally, no rales, rhonchi, or wheezes  GI: soft, nontender, nondistended, BSx4  MSK: 1+ pitting LE edema b/l  Skin: RLE warm/red  Neuro: no focal deficits, sensation grossly intact  Psych: alert and oriented x3, appropriate mood and affect Vital Signs Last 24 Hrs  T(C): 36.8 (05 Feb 2021 07:21), Max: 37.2 (04 Feb 2021 16:13)  T(F): 98.2 (05 Feb 2021 07:21), Max: 98.9 (04 Feb 2021 16:13)  HR: 87 (05 Feb 2021 07:21) (77 - 87)  BP: 151/62 (05 Feb 2021 07:21) (143/56 - 168/71)  BP(mean): --  RR: 18 (05 Feb 2021 07:21) (16 - 18)  SpO2: 97% (05 Feb 2021 07:21) (95% - 98%)    General: Awake and alert, no acute distress  Head: Normocephalic, atraumatic  Eyes: Pupils equally round and reactive to light, extraocular muscles intact, no conjunctival injection  Mouth: MMM, no oropharyngeal exudates  Neck: Supple, full ROM, no JVD, no LAD, trachea midline  CV: Regular rate and rhythym, normal S1/S2, no murmurs, rubs, or gallops, 1+ pitting LE edema b/l  Resp: Nonlabored breathing with good inspiratory effort, lungs clear to auscultation bilaterally, no rales, rhonchi, or wheezes  GI: Soft, nontender, nondistended, BSx4  MSK: 1+ pitting LE edema b/l, R > L. No back pain. No joint deformities.  Skin: RLE with erythema and warmth. No skin breakdowns.  Neuro: A&Ox3. CN II-XII grossly intact. 5/5 motor strength and intact tactile sensation in all extremities. Nonfocal.  Psych: Appropriate mood and affect. Not responding to internal stimuli. No current  or .

## 2021-02-04 NOTE — H&P ADULT - PROBLEM SELECTOR PLAN 2
Visual and auditory hallucinations for the past 2-3 days: multiple episodes at night where she sees/hears cats playing insider her house, and sees/hears large groups of people playing  -Patient understands that her house is empty but the hallucinations "feel real" Visual and auditory hallucinations for the past 2-3 days: multiple episodes at night where she sees/hears cats playing insider her house, and sees/hears large groups of people playing  -Patient understands that her house is empty but the hallucinations "feel real"  -Pt has had similar episode in the past after anesthesia  -Likely 2/2 metabolic encephalopathy i/s/o cellulitis vs psychiatric  -Psychiatry consult in the AM Visual and auditory hallucinations for the past 2-3 days: multiple episodes at night where she sees/hears cats playing insider her house, and sees/hears large groups of people playing  -Patient understands that her house is empty but the hallucinations "feel real"  -Pt has had similar episode in the past after anesthesia  -Likely 2/2 metabolic encephalopathy i/s/o cellulitis vs dementia vs psychiatric  -Psychiatry consult in the AM Visual and auditory hallucinations for the past 2-3 days: multiple episodes at night where she sees/hears cats playing insider her house, and sees/hears large groups of people playing  -Patient understands that her house is empty but the hallucinations "feel real"  -Pt has had similar episodes in the past after anesthesia  -Likely 2/2 metabolic encephalopathy i/s/o cellulitis vs dementia vs psychiatric disorder  -Psychiatry consult to be called in the AM  -If hallucinations worsen, will consider head imaging

## 2021-02-04 NOTE — ED PROVIDER NOTE - CARE PLAN
Principal Discharge DX:	Cellulitis of right lower extremity  Secondary Diagnosis:	CHF exacerbation

## 2021-02-05 DIAGNOSIS — N17.9 ACUTE KIDNEY FAILURE, UNSPECIFIED: ICD-10-CM

## 2021-02-05 DIAGNOSIS — R60.0 LOCALIZED EDEMA: ICD-10-CM

## 2021-02-05 DIAGNOSIS — N18.30 CHRONIC KIDNEY DISEASE, STAGE 3 UNSPECIFIED: ICD-10-CM

## 2021-02-05 LAB
A1C WITH ESTIMATED AVERAGE GLUCOSE RESULT: 7.5 % — HIGH (ref 4–5.6)
ALBUMIN SERPL ELPH-MCNC: 3.9 G/DL — SIGNIFICANT CHANGE UP (ref 3.3–5)
ALP SERPL-CCNC: 98 U/L — SIGNIFICANT CHANGE UP (ref 40–120)
ALT FLD-CCNC: 8 U/L — SIGNIFICANT CHANGE UP (ref 4–33)
ANION GAP SERPL CALC-SCNC: 12 MMOL/L — SIGNIFICANT CHANGE UP (ref 7–14)
AST SERPL-CCNC: 14 U/L — SIGNIFICANT CHANGE UP (ref 4–32)
BASOPHILS # BLD AUTO: 0.04 K/UL — SIGNIFICANT CHANGE UP (ref 0–0.2)
BASOPHILS NFR BLD AUTO: 0.4 % — SIGNIFICANT CHANGE UP (ref 0–2)
BILIRUB SERPL-MCNC: 0.3 MG/DL — SIGNIFICANT CHANGE UP (ref 0.2–1.2)
BUN SERPL-MCNC: 20 MG/DL — SIGNIFICANT CHANGE UP (ref 7–23)
CALCIUM SERPL-MCNC: 9.2 MG/DL — SIGNIFICANT CHANGE UP (ref 8.4–10.5)
CHLORIDE SERPL-SCNC: 100 MMOL/L — SIGNIFICANT CHANGE UP (ref 98–107)
CO2 SERPL-SCNC: 27 MMOL/L — SIGNIFICANT CHANGE UP (ref 22–31)
CREAT SERPL-MCNC: 1.48 MG/DL — HIGH (ref 0.5–1.3)
CRP SERPL-MCNC: 10.3 MG/L — HIGH
EOSINOPHIL # BLD AUTO: 0.08 K/UL — SIGNIFICANT CHANGE UP (ref 0–0.5)
EOSINOPHIL NFR BLD AUTO: 0.8 % — SIGNIFICANT CHANGE UP (ref 0–6)
ERYTHROCYTE [SEDIMENTATION RATE] IN BLOOD: 45 MM/HR — HIGH (ref 4–25)
ESTIMATED AVERAGE GLUCOSE: 169 MG/DL — HIGH (ref 68–114)
GLUCOSE SERPL-MCNC: 218 MG/DL — HIGH (ref 70–99)
HCT VFR BLD CALC: 34.9 % — SIGNIFICANT CHANGE UP (ref 34.5–45)
HGB BLD-MCNC: 10 G/DL — LOW (ref 11.5–15.5)
IANC: 7.68 K/UL — SIGNIFICANT CHANGE UP (ref 1.5–8.5)
IMM GRANULOCYTES NFR BLD AUTO: 0.2 % — SIGNIFICANT CHANGE UP (ref 0–1.5)
LYMPHOCYTES # BLD AUTO: 1.02 K/UL — SIGNIFICANT CHANGE UP (ref 1–3.3)
LYMPHOCYTES # BLD AUTO: 10.6 % — LOW (ref 13–44)
MAGNESIUM SERPL-MCNC: 2.2 MG/DL — SIGNIFICANT CHANGE UP (ref 1.6–2.6)
MCHC RBC-ENTMCNC: 24.9 PG — LOW (ref 27–34)
MCHC RBC-ENTMCNC: 28.7 GM/DL — LOW (ref 32–36)
MCV RBC AUTO: 86.8 FL — SIGNIFICANT CHANGE UP (ref 80–100)
MONOCYTES # BLD AUTO: 0.8 K/UL — SIGNIFICANT CHANGE UP (ref 0–0.9)
MONOCYTES NFR BLD AUTO: 8.3 % — SIGNIFICANT CHANGE UP (ref 2–14)
NEUTROPHILS # BLD AUTO: 7.68 K/UL — HIGH (ref 1.8–7.4)
NEUTROPHILS NFR BLD AUTO: 79.7 % — HIGH (ref 43–77)
NRBC # BLD: 0 /100 WBCS — SIGNIFICANT CHANGE UP
NRBC # FLD: 0 K/UL — SIGNIFICANT CHANGE UP
PHOSPHATE SERPL-MCNC: 2.8 MG/DL — SIGNIFICANT CHANGE UP (ref 2.5–4.5)
PLATELET # BLD AUTO: 405 K/UL — HIGH (ref 150–400)
POTASSIUM SERPL-MCNC: 3.6 MMOL/L — SIGNIFICANT CHANGE UP (ref 3.5–5.3)
POTASSIUM SERPL-SCNC: 3.6 MMOL/L — SIGNIFICANT CHANGE UP (ref 3.5–5.3)
PROT SERPL-MCNC: 7.4 G/DL — SIGNIFICANT CHANGE UP (ref 6–8.3)
RBC # BLD: 4.02 M/UL — SIGNIFICANT CHANGE UP (ref 3.8–5.2)
RBC # FLD: 16.5 % — HIGH (ref 10.3–14.5)
SARS-COV-2 RNA SPEC QL NAA+PROBE: SIGNIFICANT CHANGE UP
SODIUM SERPL-SCNC: 139 MMOL/L — SIGNIFICANT CHANGE UP (ref 135–145)
WBC # BLD: 9.64 K/UL — SIGNIFICANT CHANGE UP (ref 3.8–10.5)
WBC # FLD AUTO: 9.64 K/UL — SIGNIFICANT CHANGE UP (ref 3.8–10.5)

## 2021-02-05 PROCEDURE — 99222 1ST HOSP IP/OBS MODERATE 55: CPT

## 2021-02-05 PROCEDURE — 99233 SBSQ HOSP IP/OBS HIGH 50: CPT | Mod: GC

## 2021-02-05 PROCEDURE — 99221 1ST HOSP IP/OBS SF/LOW 40: CPT

## 2021-02-05 RX ORDER — OCTREOTIDE ACETATE 200 UG/ML
50 INJECTION, SOLUTION INTRAVENOUS; SUBCUTANEOUS ONCE
Refills: 0 | Status: DISCONTINUED | OUTPATIENT
Start: 2021-02-05 | End: 2021-02-05

## 2021-02-05 RX ORDER — FUROSEMIDE 40 MG
20 TABLET ORAL ONCE
Refills: 0 | Status: COMPLETED | OUTPATIENT
Start: 2021-02-05 | End: 2021-02-05

## 2021-02-05 RX ORDER — APIXABAN 2.5 MG/1
5 TABLET, FILM COATED ORAL EVERY 12 HOURS
Refills: 0 | Status: DISCONTINUED | OUTPATIENT
Start: 2021-02-05 | End: 2021-02-08

## 2021-02-05 RX ORDER — ACETAMINOPHEN 500 MG
650 TABLET ORAL EVERY 6 HOURS
Refills: 0 | Status: DISCONTINUED | OUTPATIENT
Start: 2021-02-05 | End: 2021-02-08

## 2021-02-05 RX ORDER — CEFAZOLIN SODIUM 1 G
500 VIAL (EA) INJECTION EVERY 12 HOURS
Refills: 0 | Status: DISCONTINUED | OUTPATIENT
Start: 2021-02-05 | End: 2021-02-05

## 2021-02-05 RX ORDER — CEFAZOLIN SODIUM 1 G
1000 VIAL (EA) INJECTION EVERY 24 HOURS
Refills: 0 | Status: DISCONTINUED | OUTPATIENT
Start: 2021-02-05 | End: 2021-02-08

## 2021-02-05 RX ORDER — OCTREOTIDE ACETATE 200 UG/ML
50 INJECTION, SOLUTION INTRAVENOUS; SUBCUTANEOUS
Qty: 500 | Refills: 0 | Status: DISCONTINUED | OUTPATIENT
Start: 2021-02-05 | End: 2021-02-05

## 2021-02-05 RX ORDER — INSULIN LISPRO 100/ML
VIAL (ML) SUBCUTANEOUS AT BEDTIME
Refills: 0 | Status: DISCONTINUED | OUTPATIENT
Start: 2021-02-05 | End: 2021-02-08

## 2021-02-05 RX ORDER — LANOLIN ALCOHOL/MO/W.PET/CERES
3 CREAM (GRAM) TOPICAL AT BEDTIME
Refills: 0 | Status: DISCONTINUED | OUTPATIENT
Start: 2021-02-05 | End: 2021-02-08

## 2021-02-05 RX ORDER — OXYCODONE HYDROCHLORIDE 5 MG/1
5 TABLET ORAL EVERY 6 HOURS
Refills: 0 | Status: DISCONTINUED | OUTPATIENT
Start: 2021-02-05 | End: 2021-02-07

## 2021-02-05 RX ADMIN — APIXABAN 5 MILLIGRAM(S): 2.5 TABLET, FILM COATED ORAL at 19:02

## 2021-02-05 RX ADMIN — FAMOTIDINE 40 MILLIGRAM(S): 10 INJECTION INTRAVENOUS at 14:10

## 2021-02-05 RX ADMIN — BUMETANIDE 0.5 MILLIGRAM(S): 0.25 INJECTION INTRAMUSCULAR; INTRAVENOUS at 14:10

## 2021-02-05 RX ADMIN — Medication 81 MILLIGRAM(S): at 14:10

## 2021-02-05 RX ADMIN — Medication 1: at 17:49

## 2021-02-05 RX ADMIN — Medication 100 MILLIGRAM(S): at 12:07

## 2021-02-05 RX ADMIN — OXYCODONE HYDROCHLORIDE 5 MILLIGRAM(S): 5 TABLET ORAL at 12:39

## 2021-02-05 RX ADMIN — AMLODIPINE BESYLATE 5 MILLIGRAM(S): 2.5 TABLET ORAL at 07:26

## 2021-02-05 RX ADMIN — Medication 2: at 14:39

## 2021-02-05 RX ADMIN — Medication 20 MILLIGRAM(S): at 14:10

## 2021-02-05 RX ADMIN — APIXABAN 2.5 MILLIGRAM(S): 2.5 TABLET, FILM COATED ORAL at 07:26

## 2021-02-05 RX ADMIN — Medication 100 MILLIGRAM(S): at 23:16

## 2021-02-05 RX ADMIN — Medication 150 MILLIGRAM(S): at 07:27

## 2021-02-05 NOTE — PROGRESS NOTE ADULT - ASSESSMENT
80 year old Bulgarian speaking female with hx of HFpEF, CAD s/p CABG in Nov 2020, R breast cancer (1980s) s/p mastectomy with chronic RUE lymphedema, colon cancer s/p resection, rectal cancer (2015) s/p chemo and radiation, HTN, HLD, T2DM, DVT (2019) on eliquis, renal insufficiency, and obesity, presenting with complaint of RLE swelling and redness and hallucinations x2-3 days, admitted for RLE cellulitis and possible CHF exacerbation.

## 2021-02-05 NOTE — PROGRESS NOTE ADULT - PROBLEM SELECTOR PLAN 6
Likely prerenal or decreased effective circulating volume i/s/o CHF exacerbation  -Cr 1.56, baselin ~1.4 from prior admissions  -diuresis as above  -Monitor BMP daily  -Avoid nephrotoxic meds Molly RLE DVT 2019, on eliquis  -F/u LE dopplers  -C/w home eliquis Hx RLE DVT 2019, on eliquis  -F/u LE dopplers, will premedicate with pain meds  -C/w home eliquis

## 2021-02-05 NOTE — PROGRESS NOTE ADULT - PROBLEM SELECTOR PLAN 5
-HgbA1c 7.2% 1/2021  -Hold home oral meds  -C/w ISS for now  -Check sugars qAC and qhs  -adjust insulin as needed -HgbA1c 7.2% 1/2021  -Hold home oral meds  -C/w ISS for now  -Check sugars qAC and qhs  -adjust insulin as needed (ie if persistently hyperglycemic will start small amount of basal/bolus insulin for better glycemic control)

## 2021-02-05 NOTE — PROGRESS NOTE ADULT - PROBLEM SELECTOR PLAN 9
CC/DASH diet  DVT PPx: on eliquis  Full code    Alexander Maria MD, PGY-2 Resident  Department of Internal Medicine  Pager: [phone] (NS) / [phone] ([place])  Email: [email or web address]  DVT PPx: on eliquis  Full code

## 2021-02-05 NOTE — PROGRESS NOTE ADULT - SUBJECTIVE AND OBJECTIVE BOX
PROGRESS NOTE:   Authored by Dr. Lisbet Kramer, [facility] pager [phone]    Patient is a 80y old  Female who presents with a chief complaint of leg swelling (2021 23:27)      SUBJECTIVE / OVERNIGHT EVENTS:    REVIEW OF SYSTEMS:    CONSTITUTIONAL: No weakness, fevers or chills  EYES/ENT: No visual changes;  No vertigo or throat pain   NECK: No pain or stiffness  BACK: no pain or lesions   RESPIRATORY: No cough, wheezing, hemoptysis; No shortness of breath  CARDIOVASCULAR: No chest pain or palpitations  GASTROINTESTINAL: No abdominal or epigastric pain. No nausea, vomiting, or hematemesis; No diarrhea or constipation. No melena or hematochezia.  GENITOURINARY: No dysuria, frequency or hematuria  NEUROLOGICAL: No numbness or weakness  EXTREMITIES: no varicose veins, no pain in UE and LE  SKIN: No itching, rashes      MEDICATIONS  (STANDING):  amLODIPine   Tablet 5 milliGRAM(s) Oral daily  apixaban 2.5 milliGRAM(s) Oral every 12 hours  aspirin enteric coated 81 milliGRAM(s) Oral daily  atorvastatin 40 milliGRAM(s) Oral at bedtime  buMETAnide 0.5 milliGRAM(s) Oral daily  ceFAZolin   IVPB 500 milliGRAM(s) IV Intermittent every 12 hours  dextrose 40% Gel 15 Gram(s) Oral once  dextrose 5%. 1000 milliLiter(s) (50 mL/Hr) IV Continuous <Continuous>  dextrose 5%. 1000 milliLiter(s) (100 mL/Hr) IV Continuous <Continuous>  dextrose 50% Injectable 25 Gram(s) IV Push once  dextrose 50% Injectable 12.5 Gram(s) IV Push once  dextrose 50% Injectable 25 Gram(s) IV Push once  famotidine    Tablet 40 milliGRAM(s) Oral daily  glucagon  Injectable 1 milliGRAM(s) IntraMuscular once  insulin lispro (ADMELOG) corrective regimen sliding scale   SubCutaneous three times a day before meals  metoprolol succinate  milliGRAM(s) Oral daily    MEDICATIONS  (PRN):      CAPILLARY BLOOD GLUCOSE      POCT Blood Glucose.: 177 mg/dL (2021 16:21)    I&O's Summary      PHYSICAL EXAM:  Vital Signs Last 24 Hrs  T(C): 36.8 (2021 07:21), Max: 37.2 (2021 16:13)  T(F): 98.2 (2021 07:21), Max: 98.9 (2021 16:13)  HR: 87 (2021 07:21) (77 - 87)  BP: 151/62 (2021 07:21) (143/56 - 168/71)  BP(mean): --  RR: 18 (2021 07:21) (16 - 18)  SpO2: 97% (2021 07:21) (95% - 98%)    GENERAL: No acute distress, well-developed  HEAD:  Atraumatic, Normocephalic  EYES: EOMI, PERRLA, conjunctiva and sclera clear  NECK: Supple, no lymphadenopathy, no JVD  CHEST/LUNG: CTAB; No wheezes, rales, or rhonchi  HEART: Regular rate and rhythm; No murmurs, rubs, or gallops  ABDOMEN: Soft, non-tender, non-distended; normal bowel sounds, no organomegaly  EXTREMITIES:  2+ peripheral pulses b/l, No clubbing, cyanosis, or edema  NEUROLOGY: A&O x 3, no focal deficits  SKIN: No rashes or lesions    LABS:                        10.1   7.89  )-----------( 417      ( 2021 21:26 )             36.0     02-04    139  |  102  |  24<H>  ----------------------------<  164<H>  4.2   |  24  |  1.56<H>    Ca    9.3      2021 21:26  Mg     2.2     02-04    TPro  7.5  /  Alb  4.0  /  TBili  0.3  /  DBili  x   /  AST  14  /  ALT  10  /  AlkPhos  100  02-04    PT/INR - ( 2021 21:26 )   PT: 12.8 sec;   INR: 1.12 ratio         PTT - ( 2021 21:26 )  PTT:26.4 sec      Urinalysis Basic - ( 2021 21:26 )    Color: Light Yellow / Appearance: Clear / S.013 / pH: x  Gluc: x / Ketone: Negative  / Bili: Negative / Urobili: <2 mg/dL   Blood: x / Protein: 30 mg/dL / Nitrite: Negative   Leuk Esterase: Negative / RBC: 6 /HPF / WBC 5 /HPF   Sq Epi: x / Non Sq Epi: 2 /HPF / Bacteria: Many          RADIOLOGY & ADDITIONAL TESTS:  Results Reviewed:   Imaging Personally Reviewed:  Electrocardiogram Personally Reviewed:    COORDINATION OF CARE:  Care Discussed with Consultants/Other Providers [Y/N]:  Prior or Outpatient Records Reviewed [Y/N]:   PROGRESS NOTE:   Authored by Dr. Lisbet Kramer, [facility] pager [phone]    Patient is a 80y old  Female who presents with a chief complaint of leg swelling (2021 23:27)      SUBJECTIVE / OVERNIGHT EVENTS:  Bulgarian translation provided by patient's daughter. Patient with RLE pain and discomfort.       MEDICATIONS  (STANDING):  amLODIPine   Tablet 5 milliGRAM(s) Oral daily  apixaban 2.5 milliGRAM(s) Oral every 12 hours  aspirin enteric coated 81 milliGRAM(s) Oral daily  atorvastatin 40 milliGRAM(s) Oral at bedtime  buMETAnide 0.5 milliGRAM(s) Oral daily  ceFAZolin   IVPB 500 milliGRAM(s) IV Intermittent every 12 hours  dextrose 40% Gel 15 Gram(s) Oral once  dextrose 5%. 1000 milliLiter(s) (50 mL/Hr) IV Continuous <Continuous>  dextrose 5%. 1000 milliLiter(s) (100 mL/Hr) IV Continuous <Continuous>  dextrose 50% Injectable 25 Gram(s) IV Push once  dextrose 50% Injectable 12.5 Gram(s) IV Push once  dextrose 50% Injectable 25 Gram(s) IV Push once  famotidine    Tablet 40 milliGRAM(s) Oral daily  glucagon  Injectable 1 milliGRAM(s) IntraMuscular once  insulin lispro (ADMELOG) corrective regimen sliding scale   SubCutaneous three times a day before meals  metoprolol succinate  milliGRAM(s) Oral daily    MEDICATIONS  (PRN):    CAPILLARY BLOOD GLUCOSE    POCT Blood Glucose.: 177 mg/dL (2021 16:21)    I&O's Summary    PHYSICAL EXAM:  Vital Signs Last 24 Hrs  T(C): 36.8 (2021 07:21), Max: 37.2 (2021 16:13)  T(F): 98.2 (2021 07:21), Max: 98.9 (2021 16:13)  HR: 87 (2021 07:21) (77 - 87)  BP: 151/62 (2021 07:21) (143/56 - 168/71)  BP(mean): --  RR: 18 (2021 07:21) (16 - 18)  SpO2: 97% (2021 07:21) (95% - 98%)    GENERAL: Uncomfortable appearing, lying in ED deann  HEAD:  Atraumatic, Normocephalic  EYES: EOMI, conjunctiva and sclera clear  NECK: Supple, no lymphadenopathy, no JVD  CHEST/LUNG: CTAB; Comfortable on RA  HEART: S1S2 Regular rate and rhythm; No murmurs, rubs, or gallops  ABDOMEN: Soft, non-tender, non-distended; normal bowel sounds, no organomegaly  EXTREMITIES:  2+ peripheral pulses b/l, +R>L LE edema, +RUE nonpitting edema, decreased ROM R knee 2/2 pain  NEUROLOGY: moving extremities, non-focal  SKIN: venous stasis changes LLE, +erythema and warmth RLE    LABS:                                   10.0   9.64  )-----------( 405      ( 2021 15:12 )             34.9                  10.1   7.89  )-----------( 417      ( 2021 21:26 )             36.0     02-05    139  |  100  |  20  ----------------------------<  218<H>  3.6   |  27  |  1.48<H>    Ca    9.2      2021 15:12  Phos  2.8     02-05  Mg     2.2     02-05    TPro  7.4  /  Alb  3.9  /  TBili  0.3  /  DBili  x   /  AST  14  /  ALT  8   /  AlkPhos  98  02-05      02-04    139  |  102  |  24<H>  ----------------------------<  164<H>  4.2   |  24  |  1.56<H>    Ca    9.3      2021 21:26  Mg     2.2     02-04    TPro  7.5  /  Alb  4.0  /  TBili  0.3  /  DBili  x   /  AST  14  /  ALT  10  /  AlkPhos  100  02-04    PT/INR - ( 2021 21:26 )   PT: 12.8 sec;   INR: 1.12 ratio         PTT - ( 2021 21:26 )  PTT:26.4 sec      Urinalysis Basic - ( 2021 21:26 )    Color: Light Yellow / Appearance: Clear / S.013 / pH: x  Gluc: x / Ketone: Negative  / Bili: Negative / Urobili: <2 mg/dL   Blood: x / Protein: 30 mg/dL / Nitrite: Negative   Leuk Esterase: Negative / RBC: 6 /HPF / WBC 5 /HPF   Sq Epi: x / Non Sq Epi: 2 /HPF / Bacteria: Many          RADIOLOGY & ADDITIONAL TESTS:  Results Reviewed:   Imaging Personally Reviewed:    < from: US Duplex Venous Lower Ext Complete, Bilateral (21 @ 22:06) >  IMPRESSION:    Nondiagnostic study at this time as the patient did not tolerate compression analysis of the bilateral common femoral and femoral veins.  Repeat imaging may be obtained after premedication.    No definitive deep venous thrombosis identified.      Bilateral soft tissue edema.    < end of copied text >  < from: Xray Tibia + Fibula 2 Views, Right (21 @ 21:13) >  FINDINGS:    Status post right knee total arthroplasty; no periprosthetic lucency or fracture. Subcutaneous surgical staples posteromedially in the proximal calf. Diffuse soft tissue swelling without focal prominence. No cortical erosion, periosteal reaction or subcutaneous gas. No acute fracture or dislocation. Joint spaces are well-preserved.    IMPRESSION:    No radiographic evidence of advanced osteomyelitis..    < end of copied text >      Electrocardiogram Personally Reviewed:    COORDINATION OF CARE:  Care Discussed with Consultants/Other Providers [Y/N]:  Prior or Outpatient Records Reviewed [Y]: Ortho, ID, Nephrology

## 2021-02-05 NOTE — CONSULT NOTE ADULT - ASSESSMENT
80 year old Occitan speaking female with hx of HFpEF, CAD s/p CABG in Nov 2020, R breast cancer (1980s) s/p mastectomy with chronic RUE lymphedema, colon cancer s/p resection, rectal cancer (2015) s/p chemo and radiation, HTN, HLD, T2DM, DVT (2019) on eliquis, renal insufficiency, and obesity, presenting with complaint of right leg swelling and redness with right leg cellulitis

## 2021-02-05 NOTE — CONSULT NOTE ADULT - SUBJECTIVE AND OBJECTIVE BOX
NURIA GARCIA 80y Female  MRN-[id number]    Patient is a 80y old  Female who presents with a chief complaint of RLE swelling and redness (2021 14:43)      HPI:  80 year old Mauritian speaking female with hx of HFpEF, CAD s/p CABG in 2020, R breast cancer () s/p mastectomy with chronic RUE lymphedema, colon cancer s/p resection, rectal cancer () s/p chemo and radiation, HTN, HLD, T2DM, DVT () on eliquis, renal insufficiency, and obesity, presenting with complaint of right leg swelling and redness. Patient was recently admitted - for RLE redness/swelling, was treated for cellulitis with vancomycin. Now she has been having worsening right lower extremity redness and swelling over the past 1-2 days. Her left leg has been more swollen than usual as well, but not to the extent of the right leg. She has also had associated leg pain and tenderness bilaterally. Additionally, she has been having visual and auditory hallucinations for the past 2-3 days. She has had multiple episodes at night where she sees/hears cats playing insider her house, and sees/hears large groups of people playing. She understands that her house is empty but the hallucinations "feel real." She has apparently had a similar episode after receiving anesthesia in the past. She has had a mild cough. She denies any SOB, CP, fever, chills, weight change, orthopnea, palpitations, abd pain, nausea, vomiting, changes in BH, or urinary sx.    In the ED, patient afebrile with HR in 80s, -170/60-70, SpO2 95% on room air. Labs significant for WBC 7.8, Hgb 10.2 (baseline ~10), ESR 47, CRP 7, BUN 24, Cr 1.56 (baseline ~1.4), BNP 1850. Utox negative. UA with bacteria, no nitrites or leuk est. CXR with cardiomegaly and b/l linear opacities c/w pulm edema. Right leg xrays with normal arthroplasty and no evidence od erosions. Patient was given bumesx 1mg x1, clindamycin 600mg IV x1.     PCP Dr Dawkins  Cardio Dr Hagan (2021 23:27)      PAST MEDICAL & SURGICAL HISTORY:  Renal insufficiency    DVT of leg (deep venous thrombosis)  right calf DVT  2019 pt on Eliquis    CHF (congestive heart failure)    Type II diabetes mellitus    Obese    Rectal cancer  s/p chemotherapy and  radiation 12 weeks 2015 -3/2015    Hyperlipidemia    Lymphedema of arm  right arm s/p mastectomy    HTN (hypertension)    Breast CA, right   s/p mastectomy ,IV Chemotherapy    S/P TKR (total knee replacement), right  2017    S/P colon resection      S/P ileostomy  low anterior resection-8/10/15, reversal of ileostomy 2016    History of appendectomy  1953    S/P mastectomy, right          Allergies    CT scan dye (Hives)  penicillin (Unknown)    Intolerances        ANTIMICROBIALS:  ceFAZolin   IVPB 500 every 12 hours      MEDICATIONS  (STANDING):  amLODIPine   Tablet 5 milliGRAM(s) Oral daily  apixaban 2.5 milliGRAM(s) Oral every 12 hours  aspirin enteric coated 81 milliGRAM(s) Oral daily  atorvastatin 40 milliGRAM(s) Oral at bedtime  buMETAnide 0.5 milliGRAM(s) Oral daily  ceFAZolin   IVPB 500 milliGRAM(s) IV Intermittent every 12 hours  dextrose 40% Gel 15 Gram(s) Oral once  dextrose 5%. 1000 milliLiter(s) (50 mL/Hr) IV Continuous <Continuous>  dextrose 5%. 1000 milliLiter(s) (100 mL/Hr) IV Continuous <Continuous>  dextrose 50% Injectable 25 Gram(s) IV Push once  dextrose 50% Injectable 12.5 Gram(s) IV Push once  dextrose 50% Injectable 25 Gram(s) IV Push once  famotidine    Tablet 40 milliGRAM(s) Oral daily  glucagon  Injectable 1 milliGRAM(s) IntraMuscular once  insulin lispro (ADMELOG) corrective regimen sliding scale   SubCutaneous three times a day before meals  insulin lispro (ADMELOG) corrective regimen sliding scale   SubCutaneous at bedtime  metoprolol succinate  milliGRAM(s) Oral daily      Social History  Smoking: no  Etoh: no        FAMILY HISTORY:  Family history of heart attack (Child)    Family history of diabetes mellitus in son    Family history of diabetes mellitus in mother        Vital Signs Last 24 Hrs  T(C): 37.1 (2021 15:39), Max: 37.2 (2021 16:13)  T(F): 98.7 (2021 15:39), Max: 98.9 (2021 16:13)  HR: 80 (2021 15:39) (77 - 87)  BP: 145/74 (2021 15:39) (143/56 - 182/95)  BP(mean): --  RR: 18 (2021 15:39) (16 - 18)  SpO2: 99% (2021 15:39) (95% - 99%)    CBC Full  -  ( 2021 15:12 )  WBC Count : 9.64 K/uL  RBC Count : 4.02 M/uL  Hemoglobin : 10.0 g/dL  Hematocrit : 34.9 %  Platelet Count - Automated : 405 K/uL  Mean Cell Volume : 86.8 fL  Mean Cell Hemoglobin : 24.9 pg  Mean Cell Hemoglobin Concentration : 28.7 gm/dL  Auto Neutrophil # : 7.68 K/uL  Auto Lymphocyte # : 1.02 K/uL  Auto Monocyte # : 0.80 K/uL  Auto Eosinophil # : 0.08 K/uL  Auto Basophil # : 0.04 K/uL  Auto Neutrophil % : 79.7 %  Auto Lymphocyte % : 10.6 %  Auto Monocyte % : 8.3 %  Auto Eosinophil % : 0.8 %  Auto Basophil % : 0.4 %    02-05    139  |  100  |  20  ----------------------------<  218<H>  3.6   |  27  |  1.48<H>    Ca    9.2      2021 15:12  Phos  2.8     02-05  Mg     2.2     02-05    TPro  7.4  /  Alb  3.9  /  TBili  0.3  /  DBili  x   /  AST  14  /  ALT  8   /  AlkPhos  98  02-05    LIVER FUNCTIONS - ( 2021 15:12 )  Alb: 3.9 g/dL / Pro: 7.4 g/dL / ALK PHOS: 98 U/L / ALT: 8 U/L / AST: 14 U/L / GGT: x           Urinalysis Basic - ( 2021 21:26 )    Color: Light Yellow / Appearance: Clear / S.013 / pH: x  Gluc: x / Ketone: Negative  / Bili: Negative / Urobili: <2 mg/dL   Blood: x / Protein: 30 mg/dL / Nitrite: Negative   Leuk Esterase: Negative / RBC: 6 /HPF / WBC 5 /HPF   Sq Epi: x / Non Sq Epi: 2 /HPF / Bacteria: Many        MICROBIOLOGY:    COVID-19 PCR . (21 @ 21:08)   COVID-19 PCR: NotDetec: You can help in the fight against COVID-19. [facility] may contact       RADIOLOGY  US Duplex Venous Lower Ext Complete, Bilateral (21 @ 22:06) >  Nondiagnostic study at this time as the patient did not tolerate compression analysis of the bilateral common femoral and femoral veins.  Repeat imaging may be obtained after premedication.    No definitive deep venous thrombosis identified.      Bilateral soft tissue edema.      < from: Xray Chest 1 View AP/PA (21 @ 21:13) >  Increased bilateral linear opacities, with cardiomegaly, can be seen in the setting pulmonary edema.      < from: Xray Tibia + Fibula 2 Views, Right (21 @ 21:13) >  No radiographic evidence of advanced osteomyelitis..

## 2021-02-05 NOTE — PROGRESS NOTE ADULT - PROBLEM SELECTOR PLAN 7
Molly RLE DVT 2019, on eliquis  -F/u LE dopplers  -C/w home eliquis - Cr 1.56 to 1.48  - suspect patient has CKD3 rather than TAO  - appreciate renal input, patient at/near baseline GFR  - continue current meds, renally dose medications as needed, avoid nephrotoxins

## 2021-02-05 NOTE — CONSULT NOTE ADULT - SUBJECTIVE AND OBJECTIVE BOX
80y Female (Slovak speaking) with PMH HFpEF, CAD (s/p CABG in 11/20), R Breast cancer (1980s) s/p mastetctomy with chronic RUE lymphedema, colon cancer s/p resection (2015) s/p chemo/radiation, HTN, HLD, T2D on Eliquis, obesity, renal insufficiency admitted to hospital with SOB. Patient also found to have worsening RLE cellulitis. Orthopedics consulted after concern for septic right knee. Patient reports minimal pain in the knee, reports pain in the right shin.  Patient's daughter Shauna, called to obtain history. States that patient has a history of right TKA four years ago by Dr. Carter ([place]). States that she has had progressive pain in the right hip over the past few months. Was seen and evaluated by Dr. Guerrero, who scheduled her for R MARLA. Inital surgery in November was rescheduled after she was found to need CABG. She last saw Dr. Guerrero on 1/18/21 in office and scheduled right MARLA for 3/1/2021. At baseline, patient ambulates with rollator.  Denies numbness/tingling. Denies fever/chills. Denies pain/injury elsewhere.       PAST MEDICAL & SURGICAL HISTORY:  Renal insufficiency    DVT of leg (deep venous thrombosis)  right calf DVT  2/2019 pt on Eliquis    CHF (congestive heart failure)    Type II diabetes mellitus    Obese    Rectal cancer  s/p chemotherapy and  radiation 12 weeks 2/2015 -3/2015    Hyperlipidemia    Lymphedema of arm  right arm s/p mastectomy    HTN (hypertension)    Breast CA, right  1989 s/p mastectomy ,IV Chemotherapy    S/P TKR (total knee replacement), right  2017    S/P colon resection  2015    S/P ileostomy  low anterior resection-8/10/15, reversal of ileostomy 2016    History of appendectomy  1953    S/P mastectomy, right  1989      MEDICATIONS  (STANDING):  amLODIPine   Tablet 5 milliGRAM(s) Oral daily  apixaban 2.5 milliGRAM(s) Oral every 12 hours  aspirin enteric coated 81 milliGRAM(s) Oral daily  atorvastatin 40 milliGRAM(s) Oral at bedtime  buMETAnide 0.5 milliGRAM(s) Oral daily  ceFAZolin   IVPB 500 milliGRAM(s) IV Intermittent every 12 hours  dextrose 40% Gel 15 Gram(s) Oral once  dextrose 5%. 1000 milliLiter(s) IV Continuous <Continuous>  dextrose 5%. 1000 milliLiter(s) IV Continuous <Continuous>  dextrose 50% Injectable 25 Gram(s) IV Push once  dextrose 50% Injectable 12.5 Gram(s) IV Push once  dextrose 50% Injectable 25 Gram(s) IV Push once  famotidine    Tablet 40 milliGRAM(s) Oral daily  glucagon  Injectable 1 milliGRAM(s) IntraMuscular once  insulin lispro (ADMELOG) corrective regimen sliding scale   SubCutaneous three times a day before meals  insulin lispro (ADMELOG) corrective regimen sliding scale   SubCutaneous at bedtime  metoprolol succinate  milliGRAM(s) Oral daily    Allergies    CT scan dye (Hives)  penicillin (Unknown)    Intolerances                            10.1   7.89  )-----------( 417      ( 04 Feb 2021 21:26 )             36.0     04 Feb 2021 21:26    139    |  102    |  24     ----------------------------<  164    4.2     |  24     |  1.56     Ca    9.3        04 Feb 2021 21:26  Mg     2.2       04 Feb 2021 21:26    TPro  7.5    /  Alb  4.0    /  TBili  0.3    /  DBili  x      /  AST  14     /  ALT  10     /  AlkPhos  100    04 Feb 2021 21:26    PT/INR - ( 04 Feb 2021 21:26 )   PT: 12.8 sec;   INR: 1.12 ratio         PTT - ( 04 Feb 2021 21:26 )  PTT:26.4 sec  Vital Signs Last 24 Hrs  T(C): 36.8 (02-05-21 @ 07:21), Max: 37.2 (02-04-21 @ 16:13)  T(F): 98.2 (02-05-21 @ 07:21), Max: 98.9 (02-04-21 @ 16:13)  HR: 77 (02-05-21 @ 14:02) (77 - 87)  BP: 175/55 (02-05-21 @ 14:02) (143/56 - 182/95)  BP(mean): --  RR: 16 (02-05-21 @ 14:02) (16 - 18)  SpO2: 98% (02-05-21 @ 14:02) (95% - 98%)    Imaging:       Physical Exam  Gen: NAD  Right LE: skin intact but weepy, erythema over the right mid shin, Knee incision C/d/i, no swelling about the knee, negative effusion, PROM , flexion contracture 15degrees, +EHL/FHL/TA/GS function, DP/PT pulses intact, compartments soft. Calf soft, nontender.    A/P: 80y Female with Right knee pain, hx R TKA    1. Order repeat XR R knee  2. Low suspicion septic knee joint given exam at this time given exam.   3.  Analgesia  4. WBAT, rolling walker, PT  5. ICE/Cryotherapy  6. Abx Management RLE cellulitis per medicine team  7. Above to be discussed with attending Dr. Guerrero. Patient to follow up with Dr. Guerrero outpatient  8. Will update with further recs. Please notify ortho with any questions.

## 2021-02-05 NOTE — CONSULT NOTE ADULT - SUBJECTIVE AND OBJECTIVE BOX
HPI: Ms. Batista is an 80 year-old [place]-speaking woman with history of multiple medical issues including hypertension, type 2 diabetes mellitus, stage 3 CKD, past breast and rectal cancer, coronary artery disease s/p CABG, and DVT on Eliquis. She is well-known to my partner Dr. Igor Kan. She presented yesterday to the [facility] ER with worsening right leg swelling and redness for 1-2 days. She also endorses visual and auditory hallucinations for 2-3 days. She has been diagnosed with LE cellulitis; she is now on IV Ancef.      PAST MEDICAL & SURGICAL HISTORY:  Renal insufficiency  DVT of leg (deep venous thrombosis) -right calf DVT  2019 pt on Eliquis  CHF (congestive heart failure)  Type II diabetes mellitus  Obese  Rectal cancer -s/p chemotherapy and  radiation 12 weeks 2015 -3/2015  Hyperlipidemia  Lymphedema of arm -right arm s/p mastectomy  HTN (hypertension)  Breast CA, right - s/p mastectomy ,IV Chemotherapy  S/P TKR (total knee replacement), right -  S/P colon resection -  low anterior resection-8/10/15, reversal of ileostomy 2016  History of appendectomy -  S/P mastectomy, right -    Allergies  CT scan dye (Hives)  penicillin (Unknown)    SOCIAL HISTORY:  Denies ETOh,Smoking,     FAMILY HISTORY:  Family history of heart attack (Child)  Family history of diabetes mellitus in son  Family history of diabetes mellitus in mother    REVIEW OF SYSTEMS:  CONSTITUTIONAL: No weakness, fevers or chills  EYES/ENT: No visual changes;  No vertigo or throat pain   NECK: No pain or stiffness  RESPIRATORY: No cough, wheezing, hemoptysis; No shortness of breath  CARDIOVASCULAR: No chest pain or palpitations; (+)LE swelling  GASTROINTESTINAL: No abdominal or epigastric pain. No nausea, vomiting, or hematemesis; No diarrhea or constipation. No melena or hematochezia.  GENITOURINARY: No dysuria, frequency or hematuria  NEUROLOGICAL: No numbness or weakness  PSYCH: (+)auditory/visual hallucinations  SKIN: (+)RLE erythema  All other review of systems is negative unless indicated above.    VITAL:  T(C): , Max: 37.2 (21 @ 16:13)  T(F): , Max: 98.9 (21 @ 16:13)  HR: 87 (21 @ 07:21)  BP: 151/62 (21 @ 07:21)  RR: 18 (21 @ 07:21)  SpO2: 97% (21 @ 07:21)    PHYSICAL EXAM:  Constitutional: NAD, Alert  HEENT: NCAT, MMM  Neck: Supple, No JVD  Respiratory: CTA-b/l  Cardiovascular: RRR s1s2, no m/r/g  Gastrointestinal: BS+, soft, NT/ND  Extremities: (+)b/l LE edema  Neurological: no focal deficits; strength grossly intact  Back: no CVAT b/l  Skin: (+)erythema RLE    LABS:                        10.1   7.89  )-----------( 417      ( 2021 21:26 )             36.0     Na(139)/K(4.2)/Cl(102)/HCO3(24)/BUN(24)/Cr(1.56)Glu(164)/Ca(9.3)/Mg(2.2)/PO4(--)     @ 21:26    Urinalysis Basic - ( 2021 21:26 )  Color: Light Yellow / Appearance: Clear / S.013 / pH: x  Gluc: x / Ketone: Negative  / Bili: Negative / Urobili: <2 mg/dL   Blood: x / Protein: 30 mg/dL / Nitrite: Negative   Leuk Esterase: Negative / RBC: 6 /HPF / WBC 5 /HPF   Sq Epi: x / Non Sq Epi: 2 /HPF / Bacteria: Many    IMAGING:  < from: US Duplex Venous Lower Ext Complete, Bilateral (21 @ 22:06) >  Nondiagnostic study at this time as the patient did not tolerate compression analysis of the bilateral common femoral and femoral veins.  Repeat imaging may be obtained after premedication.  No definitive deep venous thrombosis identified.  Bilateral soft tissue edema.      ASSESSMENT:  (1)Renal - nonproteinuric CKD3 - at/near baseline GFR  (2)Lytes - highly acceptable for now  (3)CV - acceptable BP/volume for now  (4)ID - RLE cellulitis - on IV Ancef    RECOMMEND:      Thank you for involving [facility] in this patient's care.    With warm regards,    Ignacio Morataya MD   [facility]  Office: [phone]  Cell: [phone]              HPI: Ms. Batista is an 80 year-old Japanese-speaking woman with history of multiple medical issues including hypertension, type 2 diabetes mellitus, stage 3 CKD, past breast and rectal cancer, coronary artery disease s/p CABG, and DVT on Eliquis. She is well-known to my partner Dr. Igor Kan. She presented yesterday to the [facility] ER with worsening right leg swelling and redness for 1-2 days. She also endorses visual and auditory hallucinations for 2-3 days. She has been diagnosed with LE cellulitis; she is now on IV Ancef.   PAST MEDICAL & SURGICAL HISTORY: Renal insufficiency DVT of leg (deep venous thrombosis) -right calf DVT  2019 pt on Eliquis CHF (congestive heart failure) Type II diabetes mellitus Obese Rectal cancer -s/p chemotherapy and  radiation 12 weeks 2015 -3/2015 Hyperlipidemia Lymphedema of arm -right arm s/p mastectomy HTN (hypertension) Breast CA, right - s/p mastectomy ,IV Chemotherapy S/P TKR (total knee replacement), right - S/P colon resection - low anterior resection-8/10/15, reversal of ileostomy 2016 History of appendectomy - S/P mastectomy, right -  Allergies CT scan dye (Hives) penicillin (Unknown)  SOCIAL HISTORY: Denies ETOh,Smoking,   FAMILY HISTORY: Family history of heart attack (Child) Family history of diabetes mellitus in son Family history of diabetes mellitus in mother  REVIEW OF SYSTEMS: CONSTITUTIONAL: No weakness, fevers or chills EYES/ENT: No visual changes;  No vertigo or throat pain  NECK: No pain or stiffness RESPIRATORY: No cough, wheezing, hemoptysis; No shortness of breath CARDIOVASCULAR: No chest pain or palpitations; (+)LE swelling GASTROINTESTINAL: No abdominal or epigastric pain. No nausea, vomiting, or hematemesis; No diarrhea or constipation. No melena or hematochezia. GENITOURINARY: No dysuria, frequency or hematuria NEUROLOGICAL: No numbness or weakness PSYCH: (+)auditory/visual hallucinations SKIN: (+)RLE erythema All other review of systems is negative unless indicated above.  VITAL: T(C): , Max: 37.2 (21 @ 16:13) T(F): , Max: 98.9 (21 @ 16:13) HR: 87 (21 @ 07:21) BP: 151/62 (21 @ 07:21) RR: 18 (21 @ 07:21) SpO2: 97% (21 @ 07:21)  PHYSICAL EXAM: Constitutional: NAD, Alert HEENT: NCAT, DMM Neck: Supple, No JVD Respiratory: CTA-b/l Cardiovascular: RRR s1s2, no m/r/g Gastrointestinal: BS+, soft, NT/ND Extremities: R>L LE edema; (+)large RUE nonpitting edema Neurological: no focal deficits; strength grossly intact Back: no CVAT b/l Skin: (+)erythema RLE  LABS:                      10.1  7.89  )-----------( 417      ( 2021 21:26 )            36.0   Na(139)/K(4.2)/Cl(102)/HCO3(24)/BUN(24)/Cr(1.56)Glu(164)/Ca(9.3)/Mg(2.2)/PO4(--)     @ 21:26  Urinalysis Basic - ( 2021 21:26 ) Color: Light Yellow / Appearance: Clear / S.013 / pH: x Gluc: x / Ketone: Negative  / Bili: Negative / Urobili: <2 mg/dL  Blood: x / Protein: 30 mg/dL / Nitrite: Negative  Leuk Esterase: Negative / RBC: 6 /HPF / WBC 5 /HPF  Sq Epi: x / Non Sq Epi: 2 /HPF / Bacteria: Many  IMAGING: < from: US Duplex Venous Lower Ext Complete, Bilateral (21 @ 22:06) > Nondiagnostic study at this time as the patient did not tolerate compression analysis of the bilateral common femoral and femoral veins.  Repeat imaging may be obtained after premedication. No definitive deep venous thrombosis identified. Bilateral soft tissue edema.   ASSESSMENT: (1)Renal - nonproteinuric CKD3 - at/near baseline GFR (2)Lytes - highly acceptable for now (3)CV - acceptable BP/volume for now (4)ID - RLE cellulitis - on IV Ancef  RECOMMEND: (1)Antihypertensives/diuretics as ordered (2)Continue abx for GFR 40-50ml/min  Thank you for involving [facility] in this patient's care.  With warm regards,  Ignacio Morataya MD  [facility] Office: [phone] Cell: [phone]

## 2021-02-05 NOTE — PROGRESS NOTE ADULT - PROBLEM SELECTOR PLAN 1
Worsening right lower extremity redness and swelling over the past 1-2 days, aw/ tenderness, R>L. Redness and warmth of RLE on PE, no purulence  -Right leg xrays with normal arthroplasty and no evidence of erosion  -S/p clindamycin in the ED  -C/w cefazolin for now  -Hx penicillin allergy. Pt states she had facial swelling many years ago, cannot say how long ago, but also states she has had PCN since then without issue. Also received cephalosporin on prior admission without allergic reaction  -Monitor closely for allergic reaction while on cefazolin  -F/u LE doppler studies  -F/u blood cultures Worsening right lower extremity redness and swelling over the past 1-2 days, aw/ tenderness, R>L. Redness and warmth of RLE on PE, no purulence  -Right leg xrays with normal arthroplasty and no evidence of erosion  -S/p clindamycin in the ED  -C/w cefazolin for now for RLE cellulitis, appreciate ID recs  -Hx penicillin allergy. Pt states she had facial swelling many years ago, cannot say how long ago, but also states she has had PCN since then without issue. Also received cephalosporin on prior admission without allergic reaction  -Monitor closely for allergic reaction while on cefazolin  -F/u LE doppler studies (will repeat with premedication as initial study nondiagnostic as patient unable to tolerate compression)  -F/u blood cultures  -appreciate ortho input, hx TKA, they are not suspicious for septic joint at this time, f/u knee xray, further ortho recs

## 2021-02-05 NOTE — CONSULT NOTE ADULT - PROBLEM SELECTOR RECOMMENDATION 9
-suspect strep etiology  -limb elevation  -ancef 1 gm iv q24  -f/u bcx  -doubt right TKA is involved

## 2021-02-05 NOTE — CONSULT NOTE ADULT - ATTENDING COMMENTS
low suspicion for infected TKA.  abx for cellulitis as per medicine. WBAT
Reg Robb  Attending Physician   Division of Infectious Disease  Pager #[phone]  Available on Microsoft Teams also  After 5pm/weekend or no response, call #[phone]    Please call the ID service [phone] with questions or concerns over the weekend.    I am away and will return 2/9. ID coverage available. Call ID office @ [phone] with questions.

## 2021-02-05 NOTE — PROGRESS NOTE ADULT - PROBLEM SELECTOR PLAN 3
Hx HFpEF, p/w LE swelling, denies SOB  -CXR with cardiomegaly and b/l linear opacities c/w pulmonary edema  -BNP 1850  -Last echo 11/2020 with normal systolic function  -Patient was started on 0.5 mg Bumex PRN recently  -S/p 1mg bumex in ED, c/w 0.5mg qd for now  -Daily weights  -C/w home metoprolol 150mg qd Hx HFpEF, p/w LE swelling, denies SOB  -CXR with cardiomegaly and b/l linear opacities c/w pulmonary edema  -BNP 1850  -Last echo 11/2020 with normal systolic function  -Patient was started on 0.5 mg Bumex PRN recently  -S/p 1mg bumex in ED, c/w 0.5mg qd for now  -Daily weights  -C/w home metoprolol 150mg qd  - monitor INOs

## 2021-02-05 NOTE — PROGRESS NOTE ADULT - PROBLEM SELECTOR PLAN 2
Visual and auditory hallucinations for the past 2-3 days: multiple episodes at night where she sees/hears cats playing insider her house, and sees/hears large groups of people playing  -Patient understands that her house is empty but the hallucinations "feel real"  -Pt has had similar episode in the past after anesthesia  -Likely 2/2 metabolic encephalopathy i/s/o cellulitis vs dementia vs psychiatric  -Psychiatry consult in the AM Visual and auditory hallucinations for the past 2-3 days: multiple episodes at night where she sees/hears cats playing insider her house, and sees/hears large groups of people playing  -Patient understands that her house is empty but the hallucinations "feel real"  -Pt has had similar episode in the past after anesthesia  -Likely 2/2 metabolic encephalopathy i/s/o cellulitis vs dementia vs psychiatric  -consider psychiatry eval if symptoms persist/worsen despite treatment of infection

## 2021-02-06 LAB
ANION GAP SERPL CALC-SCNC: 12 MMOL/L — SIGNIFICANT CHANGE UP (ref 7–14)
BUN SERPL-MCNC: 16 MG/DL — SIGNIFICANT CHANGE UP (ref 7–23)
CALCIUM SERPL-MCNC: 9.5 MG/DL — SIGNIFICANT CHANGE UP (ref 8.4–10.5)
CHLORIDE SERPL-SCNC: 101 MMOL/L — SIGNIFICANT CHANGE UP (ref 98–107)
CO2 SERPL-SCNC: 28 MMOL/L — SIGNIFICANT CHANGE UP (ref 22–31)
CREAT SERPL-MCNC: 1.39 MG/DL — HIGH (ref 0.5–1.3)
GLUCOSE SERPL-MCNC: 178 MG/DL — HIGH (ref 70–99)
HCT VFR BLD CALC: 38.5 % — SIGNIFICANT CHANGE UP (ref 34.5–45)
HGB BLD-MCNC: 11.1 G/DL — LOW (ref 11.5–15.5)
MAGNESIUM SERPL-MCNC: 2.2 MG/DL — SIGNIFICANT CHANGE UP (ref 1.6–2.6)
MCHC RBC-ENTMCNC: 25 PG — LOW (ref 27–34)
MCHC RBC-ENTMCNC: 28.8 GM/DL — LOW (ref 32–36)
MCV RBC AUTO: 86.7 FL — SIGNIFICANT CHANGE UP (ref 80–100)
NRBC # BLD: 0 /100 WBCS — SIGNIFICANT CHANGE UP
NRBC # FLD: 0 K/UL — SIGNIFICANT CHANGE UP
PHOSPHATE SERPL-MCNC: 3 MG/DL — SIGNIFICANT CHANGE UP (ref 2.5–4.5)
PLATELET # BLD AUTO: 381 K/UL — SIGNIFICANT CHANGE UP (ref 150–400)
POTASSIUM SERPL-MCNC: 3.9 MMOL/L — SIGNIFICANT CHANGE UP (ref 3.5–5.3)
POTASSIUM SERPL-SCNC: 3.9 MMOL/L — SIGNIFICANT CHANGE UP (ref 3.5–5.3)
RBC # BLD: 4.44 M/UL — SIGNIFICANT CHANGE UP (ref 3.8–5.2)
RBC # FLD: 16.6 % — HIGH (ref 10.3–14.5)
SODIUM SERPL-SCNC: 141 MMOL/L — SIGNIFICANT CHANGE UP (ref 135–145)
WBC # BLD: 9.5 K/UL — SIGNIFICANT CHANGE UP (ref 3.8–10.5)
WBC # FLD AUTO: 9.5 K/UL — SIGNIFICANT CHANGE UP (ref 3.8–10.5)

## 2021-02-06 PROCEDURE — 99233 SBSQ HOSP IP/OBS HIGH 50: CPT | Mod: GC

## 2021-02-06 PROCEDURE — 99232 SBSQ HOSP IP/OBS MODERATE 35: CPT

## 2021-02-06 PROCEDURE — 73560 X-RAY EXAM OF KNEE 1 OR 2: CPT | Mod: 26,RT

## 2021-02-06 PROCEDURE — 93970 EXTREMITY STUDY: CPT | Mod: 26

## 2021-02-06 RX ORDER — POLYETHYLENE GLYCOL 3350 17 G/17G
17 POWDER, FOR SOLUTION ORAL
Refills: 0 | Status: DISCONTINUED | OUTPATIENT
Start: 2021-02-06 | End: 2021-02-08

## 2021-02-06 RX ORDER — SENNA PLUS 8.6 MG/1
2 TABLET ORAL AT BEDTIME
Refills: 0 | Status: DISCONTINUED | OUTPATIENT
Start: 2021-02-06 | End: 2021-02-08

## 2021-02-06 RX ADMIN — Medication 1: at 08:33

## 2021-02-06 RX ADMIN — Medication 650 MILLIGRAM(S): at 05:47

## 2021-02-06 RX ADMIN — POLYETHYLENE GLYCOL 3350 17 GRAM(S): 17 POWDER, FOR SOLUTION ORAL at 18:19

## 2021-02-06 RX ADMIN — ATORVASTATIN CALCIUM 40 MILLIGRAM(S): 80 TABLET, FILM COATED ORAL at 22:05

## 2021-02-06 RX ADMIN — AMLODIPINE BESYLATE 5 MILLIGRAM(S): 2.5 TABLET ORAL at 05:33

## 2021-02-06 RX ADMIN — Medication 81 MILLIGRAM(S): at 12:33

## 2021-02-06 RX ADMIN — Medication 1: at 17:59

## 2021-02-06 RX ADMIN — Medication 150 MILLIGRAM(S): at 05:33

## 2021-02-06 RX ADMIN — APIXABAN 5 MILLIGRAM(S): 2.5 TABLET, FILM COATED ORAL at 05:33

## 2021-02-06 RX ADMIN — APIXABAN 5 MILLIGRAM(S): 2.5 TABLET, FILM COATED ORAL at 17:59

## 2021-02-06 RX ADMIN — SENNA PLUS 2 TABLET(S): 8.6 TABLET ORAL at 22:08

## 2021-02-06 RX ADMIN — Medication 100 MILLIGRAM(S): at 22:04

## 2021-02-06 RX ADMIN — FAMOTIDINE 40 MILLIGRAM(S): 10 INJECTION INTRAVENOUS at 12:33

## 2021-02-06 RX ADMIN — BUMETANIDE 0.5 MILLIGRAM(S): 0.25 INJECTION INTRAMUSCULAR; INTRAVENOUS at 05:33

## 2021-02-06 RX ADMIN — Medication 1: at 12:33

## 2021-02-06 NOTE — PHYSICAL THERAPY INITIAL EVALUATION ADULT - PLANNED THERAPY INTERVENTIONS, PT EVAL
Patient left supine in bed in NAD, call bell in reach, all lines intact. +Bed alarm./balance training/bed mobility training/strengthening/transfer training

## 2021-02-06 NOTE — PROGRESS NOTE ADULT - SUBJECTIVE AND OBJECTIVE BOX
PROGRESS NOTE:   Authored by Dr. Lisbet Kramer, [facility] pager [phone]    Patient is a 80y old  Female who presents with a chief complaint of RLE swelling and redness (2021 15:57)      SUBJECTIVE / OVERNIGHT EVENTS: No events overnight. Patient appeared well today and  Her leg appeared Pt denied n/v/d/c, fever, SOB, chest pain.     MEDICATIONS  (STANDING):  amLODIPine   Tablet 5 milliGRAM(s) Oral daily  apixaban 5 milliGRAM(s) Oral every 12 hours  aspirin enteric coated 81 milliGRAM(s) Oral daily  atorvastatin 40 milliGRAM(s) Oral at bedtime  buMETAnide 0.5 milliGRAM(s) Oral daily  ceFAZolin   IVPB 1000 milliGRAM(s) IV Intermittent every 24 hours  dextrose 40% Gel 15 Gram(s) Oral once  dextrose 5%. 1000 milliLiter(s) (50 mL/Hr) IV Continuous <Continuous>  dextrose 5%. 1000 milliLiter(s) (100 mL/Hr) IV Continuous <Continuous>  dextrose 50% Injectable 25 Gram(s) IV Push once  dextrose 50% Injectable 12.5 Gram(s) IV Push once  dextrose 50% Injectable 25 Gram(s) IV Push once  famotidine    Tablet 40 milliGRAM(s) Oral daily  glucagon  Injectable 1 milliGRAM(s) IntraMuscular once  insulin lispro (ADMELOG) corrective regimen sliding scale   SubCutaneous three times a day before meals  insulin lispro (ADMELOG) corrective regimen sliding scale   SubCutaneous at bedtime  melatonin 3 milliGRAM(s) Oral at bedtime  metoprolol succinate  milliGRAM(s) Oral daily    MEDICATIONS  (PRN):  acetaminophen   Tablet .. 650 milliGRAM(s) Oral every 6 hours PRN Mild Pain (1 - 3), Moderate Pain (4 - 6)  oxyCODONE    IR 5 milliGRAM(s) Oral every 6 hours PRN Severe Pain (7 - 10)      CAPILLARY BLOOD GLUCOSE      POCT Blood Glucose.: 156 mg/dL (2021 12:08)  POCT Blood Glucose.: 185 mg/dL (2021 07:59)  POCT Blood Glucose.: 132 mg/dL (2021 20:37)  POCT Blood Glucose.: 170 mg/dL (2021 17:06)  POCT Blood Glucose.: 228 mg/dL (2021 14:36)    I&O's Summary      PHYSICAL EXAM:  Vital Signs Last 24 Hrs  T(C): 36.6 (2021 12:35), Max: 37.1 (2021 15:39)  T(F): 97.9 (2021 12:35), Max: 98.7 (2021 15:39)  HR: 72 (2021 12:35) (72 - 84)  BP: 142/60 (2021 12:35) (142/60 - 175/55)  BP(mean): --  RR: 18 (2021 12:35) (16 - 18)  SpO2: 99% (2021 12:35) (95% - 99%)    GENERAL: No acute distress, well-developed  HEAD:  Atraumatic, Normocephalic  EYES: EOMI, PERRLA, conjunctiva and sclera clear  NECK: Supple, no lymphadenopathy, no JVD  CHEST/LUNG: CTAB; No wheezes, rales, or rhonchi  HEART: Regular rate and rhythm; No murmurs, rubs, or gallops  ABDOMEN: Soft, non-tender, non-distended; normal bowel sounds, no organomegaly  EXTREMITIES:  2+ peripheral pulses b/l, No clubbing, cyanosis, or edema  NEUROLOGY: A&O x 3, no focal deficits  SKIN: No rashes or lesions    LABS:                        11.1   9.50  )-----------( 381      ( 2021 07:37 )             38.5     02-06    141  |  101  |  16  ----------------------------<  178<H>  3.9   |  28  |  1.39<H>    Ca    9.5      2021 07:37  Phos  3.0     02-06  Mg     2.2     02-06    TPro  7.4  /  Alb  3.9  /  TBili  0.3  /  DBili  x   /  AST  14  /  ALT  8   /  AlkPhos  98  02-05    PT/INR - ( 2021 21:26 )   PT: 12.8 sec;   INR: 1.12 ratio         PTT - ( 2021 21:26 )  PTT:26.4 sec      Urinalysis Basic - ( 2021 21:26 )    Color: Light Yellow / Appearance: Clear / S.013 / pH: x  Gluc: x / Ketone: Negative  / Bili: Negative / Urobili: <2 mg/dL   Blood: x / Protein: 30 mg/dL / Nitrite: Negative   Leuk Esterase: Negative / RBC: 6 /HPF / WBC 5 /HPF   Sq Epi: x / Non Sq Epi: 2 /HPF / Bacteria: Many    tele: PVC's with rates in 70's    Culture - Blood (collected 2021 07:14)  Source: .Blood Blood-Peripheral  Preliminary Report (2021 08:01):    No growth to date.    Culture - Blood (collected 2021 06:59)  Source: .Blood Blood-Venous  Preliminary Report (2021 07:02):    No growth to date.        RADIOLOGY & ADDITIONAL TESTS:  Results Reviewed:   Imaging Personally Reviewed:  Electrocardiogram Personally Reviewed:    COORDINATION OF CARE:  Care Discussed with Consultants/Other Providers [Y/N]:  Prior or Outpatient Records Reviewed [Y/N]:   PROGRESS NOTE:   Authored by Dr. Lisbet Kramer, [facility] pager [phone]    Patient is a 80y old  Female who presents with a chief complaint of RLE swelling and redness (2021 15:57)      SUBJECTIVE / OVERNIGHT EVENTS: No events overnight. Patient appeared well today. Denied n/v but reports a decrease in apetite. She states that she feels constipated and hasnt had a BM in a few days. Her leg appeared less swollen and red. Pt denied fever, SOB, chest pain. Daughter was called and updated and translated for my conversation with patient     MEDICATIONS  (STANDING):  amLODIPine   Tablet 5 milliGRAM(s) Oral daily  apixaban 5 milliGRAM(s) Oral every 12 hours  aspirin enteric coated 81 milliGRAM(s) Oral daily  atorvastatin 40 milliGRAM(s) Oral at bedtime  buMETAnide 0.5 milliGRAM(s) Oral daily  ceFAZolin   IVPB 1000 milliGRAM(s) IV Intermittent every 24 hours  dextrose 40% Gel 15 Gram(s) Oral once  dextrose 5%. 1000 milliLiter(s) (50 mL/Hr) IV Continuous <Continuous>  dextrose 5%. 1000 milliLiter(s) (100 mL/Hr) IV Continuous <Continuous>  dextrose 50% Injectable 25 Gram(s) IV Push once  dextrose 50% Injectable 12.5 Gram(s) IV Push once  dextrose 50% Injectable 25 Gram(s) IV Push once  famotidine    Tablet 40 milliGRAM(s) Oral daily  glucagon  Injectable 1 milliGRAM(s) IntraMuscular once  insulin lispro (ADMELOG) corrective regimen sliding scale   SubCutaneous three times a day before meals  insulin lispro (ADMELOG) corrective regimen sliding scale   SubCutaneous at bedtime  melatonin 3 milliGRAM(s) Oral at bedtime  metoprolol succinate  milliGRAM(s) Oral daily    MEDICATIONS  (PRN):  acetaminophen   Tablet .. 650 milliGRAM(s) Oral every 6 hours PRN Mild Pain (1 - 3), Moderate Pain (4 - 6)  oxyCODONE    IR 5 milliGRAM(s) Oral every 6 hours PRN Severe Pain (7 - 10)      CAPILLARY BLOOD GLUCOSE      POCT Blood Glucose.: 156 mg/dL (2021 12:08)  POCT Blood Glucose.: 185 mg/dL (2021 07:59)  POCT Blood Glucose.: 132 mg/dL (2021 20:37)  POCT Blood Glucose.: 170 mg/dL (2021 17:06)  POCT Blood Glucose.: 228 mg/dL (2021 14:36)    I&O's Summary      PHYSICAL EXAM:  Vital Signs Last 24 Hrs  T(C): 36.6 (2021 12:35), Max: 37.1 (2021 15:39)  T(F): 97.9 (2021 12:35), Max: 98.7 (2021 15:39)  HR: 72 (2021 12:35) (72 - 84)  BP: 142/60 (2021 12:35) (142/60 - 175/55)  BP(mean): --  RR: 18 (2021 12:35) (16 - 18)  SpO2: 99% (2021 12:35) (95% - 99%)    GENERAL: No acute distress, well-developed  HEAD:  Atraumatic, Normocephalic  EYES: EOMI, PERRLA, conjunctiva and sclera clear  NECK: Supple, no lymphadenopathy, no JVD  CHEST/LUNG: CTAB; No wheezes, rales, or rhonchi  HEART: Regular rate and rhythm; No murmurs, rubs, or gallops  ABDOMEN: Soft, non-tender, slightly distended; normal bowel sounds, no organomegaly  EXTREMITIES:  2+ peripheral pulses b/l, No clubbing, cyanosis, or edema  NEUROLOGY: A&O x 3, no focal deficits  SKIN: rash on right leg appears smaller and not as hot. no open lesions noted     LABS:                        11.1   9.50  )-----------( 381      ( 2021 07:37 )             38.5     02-06    141  |  101  |  16  ----------------------------<  178<H>  3.9   |  28  |  1.39<H>    Ca    9.5      2021 07:37  Phos  3.0     02-06  Mg     2.2     02-06    TPro  7.4  /  Alb  3.9  /  TBili  0.3  /  DBili  x   /  AST  14  /  ALT  8   /  AlkPhos  98  02-05    PT/INR - ( 2021 21:26 )   PT: 12.8 sec;   INR: 1.12 ratio         PTT - ( 2021 21:26 )  PTT:26.4 sec      Urinalysis Basic - ( 2021 21:26 )    Color: Light Yellow / Appearance: Clear / S.013 / pH: x  Gluc: x / Ketone: Negative  / Bili: Negative / Urobili: <2 mg/dL   Blood: x / Protein: 30 mg/dL / Nitrite: Negative   Leuk Esterase: Negative / RBC: 6 /HPF / WBC 5 /HPF   Sq Epi: x / Non Sq Epi: 2 /HPF / Bacteria: Many    tele: PVC's with rates in 70's    Culture - Blood (collected 2021 07:14)  Source: .Blood Blood-Peripheral  Preliminary Report (2021 08:01):    No growth to date.    Culture - Blood (collected 2021 06:59)  Source: .Blood Blood-Venous  Preliminary Report (2021 07:02):    No growth to date.        RADIOLOGY & ADDITIONAL TESTS:  Results Reviewed:   Imaging Personally Reviewed:  Electrocardiogram Personally Reviewed:    COORDINATION OF CARE:  Care Discussed with Consultants/Other Providers [Y/N]:  Prior or Outpatient Records Reviewed [Y/N]:   PROGRESS NOTE:   Authored by Dr. Lisbet Kramer, [facility] pager [phone]    Patient is a 80y old  Female who presents with a chief complaint of RLE swelling and redness (2021 15:57)      SUBJECTIVE / OVERNIGHT EVENTS: No events overnight. Patient appeared well today. Denied n/v but reports a decrease in apetite. She states that she feels constipated and hasnt had a BM in a few days. Her leg appeared less swollen and red. Pt denied fever, SOB, chest pain. Daughter was called and updated and translated for my conversation with patient     MEDICATIONS  (STANDING):  amLODIPine   Tablet 5 milliGRAM(s) Oral daily  apixaban 5 milliGRAM(s) Oral every 12 hours  aspirin enteric coated 81 milliGRAM(s) Oral daily  atorvastatin 40 milliGRAM(s) Oral at bedtime  buMETAnide 0.5 milliGRAM(s) Oral daily  ceFAZolin   IVPB 1000 milliGRAM(s) IV Intermittent every 24 hours  dextrose 40% Gel 15 Gram(s) Oral once  dextrose 5%. 1000 milliLiter(s) (50 mL/Hr) IV Continuous <Continuous>  dextrose 5%. 1000 milliLiter(s) (100 mL/Hr) IV Continuous <Continuous>  dextrose 50% Injectable 25 Gram(s) IV Push once  dextrose 50% Injectable 12.5 Gram(s) IV Push once  dextrose 50% Injectable 25 Gram(s) IV Push once  famotidine    Tablet 40 milliGRAM(s) Oral daily  glucagon  Injectable 1 milliGRAM(s) IntraMuscular once  insulin lispro (ADMELOG) corrective regimen sliding scale   SubCutaneous three times a day before meals  insulin lispro (ADMELOG) corrective regimen sliding scale   SubCutaneous at bedtime  melatonin 3 milliGRAM(s) Oral at bedtime  metoprolol succinate  milliGRAM(s) Oral daily    MEDICATIONS  (PRN):  acetaminophen   Tablet .. 650 milliGRAM(s) Oral every 6 hours PRN Mild Pain (1 - 3), Moderate Pain (4 - 6)  oxyCODONE    IR 5 milliGRAM(s) Oral every 6 hours PRN Severe Pain (7 - 10)      CAPILLARY BLOOD GLUCOSE      POCT Blood Glucose.: 156 mg/dL (2021 12:08)  POCT Blood Glucose.: 185 mg/dL (2021 07:59)  POCT Blood Glucose.: 132 mg/dL (2021 20:37)  POCT Blood Glucose.: 170 mg/dL (2021 17:06)  POCT Blood Glucose.: 228 mg/dL (2021 14:36)    I&O's Summary      PHYSICAL EXAM:  Vital Signs Last 24 Hrs  T(C): 36.6 (2021 12:35), Max: 37.1 (2021 15:39)  T(F): 97.9 (2021 12:35), Max: 98.7 (2021 15:39)  HR: 72 (2021 12:35) (72 - 84)  BP: 142/60 (2021 12:35) (142/60 - 175/55)  BP(mean): --  RR: 18 (2021 12:35) (16 - 18)  SpO2: 99% (2021 12:35) (95% - 99%)    GENERAL: No acute distress, well-developed  HEAD:  Atraumatic, Normocephalic  EYES: EOMI, PERRLA, conjunctiva and sclera clear  NECK: Supple, no lymphadenopathy, no JVD  CHEST/LUNG: CTAB; No wheezes, rales, or rhonchi  HEART: Regular rate and rhythm; No murmurs, rubs, or gallops  ABDOMEN: Soft, non-tender, slightly distended; normal bowel sounds, no organomegaly  EXTREMITIES:  2+ peripheral pulses b/l, No clubbing, cyanosis, or LLE edema  NEUROLOGY: A&O x 3, no focal deficits  SKIN: erythema on right leg appears smaller and not as hot. no open lesions noted     LABS:                        11.1   9.50  )-----------( 381      ( 2021 07:37 )             38.5     02-06    141  |  101  |  16  ----------------------------<  178<H>  3.9   |  28  |  1.39<H>    Ca    9.5      2021 07:37  Phos  3.0     02-06  Mg     2.2     02-06    TPro  7.4  /  Alb  3.9  /  TBili  0.3  /  DBili  x   /  AST  14  /  ALT  8   /  AlkPhos  98  02-05    PT/INR - ( 2021 21:26 )   PT: 12.8 sec;   INR: 1.12 ratio         PTT - ( 2021 21:26 )  PTT:26.4 sec      Urinalysis Basic - ( 2021 21:26 )    Color: Light Yellow / Appearance: Clear / S.013 / pH: x  Gluc: x / Ketone: Negative  / Bili: Negative / Urobili: <2 mg/dL   Blood: x / Protein: 30 mg/dL / Nitrite: Negative   Leuk Esterase: Negative / RBC: 6 /HPF / WBC 5 /HPF   Sq Epi: x / Non Sq Epi: 2 /HPF / Bacteria: Many    tele: PVC's with rates in 70's    Culture - Blood (collected 2021 07:14)  Source: .Blood Blood-Peripheral  Preliminary Report (2021 08:01):    No growth to date.    Culture - Blood (collected 2021 06:59)  Source: .Blood Blood-Venous  Preliminary Report (2021 07:02):    No growth to date.        RADIOLOGY & ADDITIONAL TESTS:  Results Reviewed:   Imaging Personally Reviewed:  Electrocardiogram Personally Reviewed:    COORDINATION OF CARE:  Care Discussed with Consultants/Other Providers [Y/N]:  Prior or Outpatient Records Reviewed [Y]: ID

## 2021-02-06 NOTE — PHYSICAL THERAPY INITIAL EVALUATION ADULT - DISCHARGE DISPOSITION, PT EVAL
Anticipated discharge to home with home PT services to improve functional mobility and strength, to optimize safety within the home environment, and to allow pt to return to prior level of function. Spoke to daughter who reports family doesn't want rehab for patient. Home with PT is preferred. Recommend home health aide to assist patient with ADLs.

## 2021-02-06 NOTE — PROGRESS NOTE ADULT - PROBLEM SELECTOR PLAN 2
Visual and auditory hallucinations for the past 2-3 days: multiple episodes at night where she sees/hears cats playing insider her house, and sees/hears large groups of people playing  -Patient understands that her house is empty but the hallucinations "feel real"  -Pt has had similar episode in the past after anesthesia  -Likely 2/2 metabolic encephalopathy i/s/o cellulitis vs dementia vs psychiatric  -consider psychiatry eval if symptoms persist/worsen despite treatment of infection  - c/w melatonin at night for sundowning

## 2021-02-06 NOTE — PHYSICAL THERAPY INITIAL EVALUATION ADULT - PERTINENT HX OF CURRENT PROBLEM, REHAB EVAL
80 year old Kiswahili speaking female with hx of HFpEF, CAD s/p CABG in Nov 2020, R breast cancer (1980s) s/p mastectomy with chronic RUE lymphedema, colon cancer s/p resection, rectal cancer (2015) s/p chemo and radiation, HTN, HLD, T2DM, DVT (2019) on eliquis, renal insufficiency, and obesity, presenting with complaint of right leg swelling and redness. Xray right LE negative. CXR increased bilateral linear opacities, cardiomegaly.

## 2021-02-06 NOTE — PROGRESS NOTE ADULT - PROBLEM SELECTOR PLAN 9
CC/DASH diet  DVT PPx: on eliquis  Full code On tele because she had 20 beats of asymp Vtach--> keep on for 48-72 hrs and if uneventful can d/c tele  CC/DASH diet  DVT PPx: on eliquis  Full code

## 2021-02-06 NOTE — PHYSICAL THERAPY INITIAL EVALUATION ADULT - GAIT DEVIATIONS NOTED, PT EVAL
flexed trunk posture; antalgic gait right LE, daughter reports patient with chronic right hip pain with ambulation/decreased david/decreased step length/decreased weight-shifting ability

## 2021-02-06 NOTE — PROGRESS NOTE ADULT - PROBLEM SELECTOR PLAN 1
suspect strep etiology  -limb elevation  -Contine ancef 1 gm iv q24  -f/u bcx so far no growth  -doubt right TKA is involved.  -f/u LE dopplers

## 2021-02-06 NOTE — PROGRESS NOTE ADULT - PROBLEM SELECTOR PLAN 7
- Cr 1.56 to 1.48  - suspect patient has CKD3 rather than TAO  - appreciate renal input, patient at/near baseline GFR  - continue current meds, renally dose medications as needed, avoid nephrotoxins

## 2021-02-06 NOTE — PROGRESS NOTE ADULT - PROBLEM SELECTOR PLAN 3
Hx HFpEF, p/w LE swelling, denies SOB  -CXR with cardiomegaly and b/l linear opacities c/w pulmonary edema  -BNP 1850  -Last echo 11/2020 with normal systolic function  -Patient was started on 0.5 mg Bumex PRN recently  -S/p 1mg bumex in ED, c/w 0.5mg qd for now  -Daily weights  -C/w home metoprolol 150mg qd  - c/w home bumex   - monitor INOs  - will give 20-40 IV lasix PRN depending on fluid status assessment daily

## 2021-02-06 NOTE — PROGRESS NOTE ADULT - ASSESSMENT
80 year old Vietnamese speaking female with hx of HFpEF, CAD s/p CABG in Nov 2020, R breast cancer (1980s) s/p mastectomy with chronic RUE lymphedema, colon cancer s/p resection, rectal cancer (2015) s/p chemo and radiation, HTN, HLD, T2DM, DVT (2019) on eliquis, renal insufficiency, and obesity, presenting with complaint of right leg swelling and redness with right leg cellulitis

## 2021-02-06 NOTE — PROGRESS NOTE ADULT - SUBJECTIVE AND OBJECTIVE BOX
CC: Patient is a 80y old  Female who presents with a chief complaint of RLE swelling and redness (2021 13:36)    ID following for RLE cellulitis    Interval History/ROS:     Rest of ROS negative.    Allergies  CT scan dye (Hives)  penicillin (Unknown)        ANTIMICROBIALS:  ceFAZolin   IVPB 1000 every 24 hours      OTHER MEDS:  acetaminophen   Tablet .. 650 milliGRAM(s) Oral every 6 hours PRN  amLODIPine   Tablet 5 milliGRAM(s) Oral daily  apixaban 5 milliGRAM(s) Oral every 12 hours  aspirin enteric coated 81 milliGRAM(s) Oral daily  atorvastatin 40 milliGRAM(s) Oral at bedtime  bisacodyl Suppository 10 milliGRAM(s) Rectal daily PRN  buMETAnide 0.5 milliGRAM(s) Oral daily  dextrose 40% Gel 15 Gram(s) Oral once  dextrose 5%. 1000 milliLiter(s) IV Continuous <Continuous>  dextrose 5%. 1000 milliLiter(s) IV Continuous <Continuous>  dextrose 50% Injectable 25 Gram(s) IV Push once  dextrose 50% Injectable 12.5 Gram(s) IV Push once  dextrose 50% Injectable 25 Gram(s) IV Push once  famotidine    Tablet 40 milliGRAM(s) Oral daily  glucagon  Injectable 1 milliGRAM(s) IntraMuscular once  insulin lispro (ADMELOG) corrective regimen sliding scale   SubCutaneous three times a day before meals  insulin lispro (ADMELOG) corrective regimen sliding scale   SubCutaneous at bedtime  melatonin 3 milliGRAM(s) Oral at bedtime  metoprolol succinate  milliGRAM(s) Oral daily  oxyCODONE    IR 5 milliGRAM(s) Oral every 6 hours PRN  polyethylene glycol 3350 17 Gram(s) Oral two times a day  senna 2 Tablet(s) Oral at bedtime      PE:    Vital Signs Last 24 Hrs  T(C): 36.6 (2021 12:35), Max: 36.7 (2021 23:08)  T(F): 97.9 (2021 12:35), Max: 98 (2021 23:08)  HR: 72 (2021 12:35) (72 - 84)  BP: 142/60 (2021 12:35) (142/60 - 157/68)  BP(mean): --  RR: 18 (2021 12:35) (18 - 18)  SpO2: 99% (2021 12:35) (95% - 99%)    Gen: AOx3, NAD, non-toxic, pleasant  CV: S1+S2 normal, no murmurs, nontachycardic  Resp: Clear bilat, no resp distress, no crackles/wheezes  Abd: Soft, nontender, +BS  Ext: No LE edema, no wounds  : No Dowling  IV/Skin: No thrombophlebitis  Neuro: no focal deficits    LABS:                          11.1   9.50  )-----------( 381      ( 2021 07:37 )             38.5       02-    141  |  101  |  16  ----------------------------<  178<H>  3.9   |  28  |  1.39<H>    Ca    9.5      2021 07:37  Phos  3.0     02-  Mg     2.2     -    TPro  7.4  /  Alb  3.9  /  TBili  0.3  /  DBili  x   /  AST  14  /  ALT  8   /  AlkPhos  98  02-05      Urinalysis Basic - ( 2021 21:26 )    Color: Light Yellow / Appearance: Clear / S.013 / pH: x  Gluc: x / Ketone: Negative  / Bili: Negative / Urobili: <2 mg/dL   Blood: x / Protein: 30 mg/dL / Nitrite: Negative   Leuk Esterase: Negative / RBC: 6 /HPF / WBC 5 /HPF   Sq Epi: x / Non Sq Epi: 2 /HPF / Bacteria: Many        MICROBIOLOGY:  v  .Blood Blood-Peripheral  21   No growth to date.  --  --      .Blood Blood-Venous  21   No growth to date.  --  --    RADIOLOGY:    < from: Xray Knee 1 or 2 Views, Right (21 @ 09:17) >  IMPRESSION: The patient is status post right total knee arthroplasty. Componentsare well-positioned. There is no periprosthetic fracture or joint effusion. Vascular calcification is present.    < end of copied text >   CC: Patient is a 80y old  Female who presents with a chief complaint of RLE swelling and redness (2021 13:36)    ID following for RLE cellulitis    Interval History/ROS: Remains with RLE redness and swelling. No fevers, WBC WNL.     Rest of ROS negative.    Allergies  CT scan dye (Hives)  penicillin (Unknown)    ANTIMICROBIALS:  ceFAZolin   IVPB 1000 every 24 hours    OTHER MEDS:  acetaminophen   Tablet .. 650 milliGRAM(s) Oral every 6 hours PRN  amLODIPine   Tablet 5 milliGRAM(s) Oral daily  apixaban 5 milliGRAM(s) Oral every 12 hours  aspirin enteric coated 81 milliGRAM(s) Oral daily  atorvastatin 40 milliGRAM(s) Oral at bedtime  bisacodyl Suppository 10 milliGRAM(s) Rectal daily PRN  buMETAnide 0.5 milliGRAM(s) Oral daily  dextrose 40% Gel 15 Gram(s) Oral once  dextrose 5%. 1000 milliLiter(s) IV Continuous <Continuous>  dextrose 5%. 1000 milliLiter(s) IV Continuous <Continuous>  dextrose 50% Injectable 25 Gram(s) IV Push once  dextrose 50% Injectable 12.5 Gram(s) IV Push once  dextrose 50% Injectable 25 Gram(s) IV Push once  famotidine    Tablet 40 milliGRAM(s) Oral daily  glucagon  Injectable 1 milliGRAM(s) IntraMuscular once  insulin lispro (ADMELOG) corrective regimen sliding scale   SubCutaneous three times a day before meals  insulin lispro (ADMELOG) corrective regimen sliding scale   SubCutaneous at bedtime  melatonin 3 milliGRAM(s) Oral at bedtime  metoprolol succinate  milliGRAM(s) Oral daily  oxyCODONE    IR 5 milliGRAM(s) Oral every 6 hours PRN  polyethylene glycol 3350 17 Gram(s) Oral two times a day  senna 2 Tablet(s) Oral at bedtime    PE:    Vital Signs Last 24 Hrs  T(C): 36.6 (2021 12:35), Max: 36.7 (2021 23:08)  T(F): 97.9 (2021 12:35), Max: 98 (2021 23:08)  HR: 72 (2021 12:35) (72 - 84)  BP: 142/60 (2021 12:35) (142/60 - 157/68)  BP(mean): --  RR: 18 (2021 12:35) (18 - 18)  SpO2: 99% (2021 12:35) (95% - 99%)    Gen: Awake, alert, NAD  CV: S1+S2 normal, no murmurs  Resp: Clear bilat, no resp distress  Abd: Soft, nontender, +BS  Ext: Right leg erythema and swelling  : No Dowling  IV/Skin: No thrombophlebitis  Neuro: no focal deficits    LABS:                          11.1   9.50  )-----------( 381      ( 2021 07:37 )             38.5       02-    141  |  101  |  16  ----------------------------<  178<H>  3.9   |  28  |  1.39<H>    Ca    9.5      2021 07:37  Phos  3.0     02-  Mg     2.2     -    TPro  7.4  /  Alb  3.9  /  TBili  0.3  /  DBili  x   /  AST  14  /  ALT  8   /  AlkPhos  98  02-      Urinalysis Basic - ( 2021 21:26 )    Color: Light Yellow / Appearance: Clear / S.013 / pH: x  Gluc: x / Ketone: Negative  / Bili: Negative / Urobili: <2 mg/dL   Blood: x / Protein: 30 mg/dL / Nitrite: Negative   Leuk Esterase: Negative / RBC: 6 /HPF / WBC 5 /HPF   Sq Epi: x / Non Sq Epi: 2 /HPF / Bacteria: Many        MICROBIOLOGY:  v  .Blood Blood-Peripheral  21   No growth to date.  --  --      .Blood Blood-Venous  21   No growth to date.  --  --    RADIOLOGY:    < from: Xray Knee 1 or 2 Views, Right (21 @ 09:17) >  IMPRESSION: The patient is status post right total knee arthroplasty. Componentsare well-positioned. There is no periprosthetic fracture or joint effusion. Vascular calcification is present.    < end of copied text >

## 2021-02-06 NOTE — PROGRESS NOTE ADULT - PROBLEM SELECTOR PLAN 6
Hx RLE DVT 2019, on eliquis  -F/u LE dopplers, will premedicate with pain meds  -C/w eliquis but dose was increased from 2.5 to a theraputic dose of 5mg bid due to hx of DVT

## 2021-02-06 NOTE — PROGRESS NOTE ADULT - ASSESSMENT
80 year old Indonesian speaking female with hx of HFpEF, CAD s/p CABG in Nov 2020, R breast cancer (1980s) s/p mastectomy with chronic RUE lymphedema, colon cancer s/p resection, rectal cancer (2015) s/p chemo and radiation, HTN, HLD, T2DM, DVT (2019) on eliquis, renal insufficiency, and obesity, presenting with complaint of RLE swelling and redness and hallucinations x2-3 days, admitted for RLE cellulitis

## 2021-02-06 NOTE — PROGRESS NOTE ADULT - PROBLEM SELECTOR PLAN 5
-HgbA1c 7.2% 1/2021  -Hold home oral meds  -C/w ISS for now  -Check sugars qAC and qhs  -adjust insulin as needed (ie if persistently hyperglycemic will start small amount of basal/bolus insulin for better glycemic control)

## 2021-02-06 NOTE — PHYSICAL THERAPY INITIAL EVALUATION ADULT - GENERAL OBSERVATIONS, REHAB EVAL
Pt found semi reclined in bed; Croatian speaking,  phone used [employer] #[phone] ; in addition, daughter called patient on phone at the end of evaluation.

## 2021-02-06 NOTE — PROGRESS NOTE ADULT - PROBLEM SELECTOR PLAN 1
Worsening right lower extremity redness and swelling over the past 1-2 days, aw/ tenderness, R>L. Redness and warmth of RLE on PE, no purulence  -Right leg xrays with normal arthroplasty and no evidence of erosion  -S/p clindamycin in the ED  -C/w cefazolin for now for RLE cellulitis, appreciate ID recs  -Hx penicillin allergy. Pt states she had facial swelling many years ago, cannot say how long ago, but also states she has had PCN since then without issue. Also received cephalosporin on prior admission without allergic reaction  -Monitor closely for allergic reaction while on cefazolin  -F/u LE doppler studies (will repeat with premedication as initial study nondiagnostic as patient unable to tolerate compression)  - blood cultures NGTD so far   -appreciate ortho input, hx TKA, they are not suspicious for septic joint at this time, knee xray normal so no interventions needed at this time

## 2021-02-07 LAB
-  AMIKACIN: SIGNIFICANT CHANGE UP
-  AMOXICILLIN/CLAVULANIC ACID: SIGNIFICANT CHANGE UP
-  AMPICILLIN/SULBACTAM: SIGNIFICANT CHANGE UP
-  AMPICILLIN: SIGNIFICANT CHANGE UP
-  AZTREONAM: SIGNIFICANT CHANGE UP
-  CEFAZOLIN: SIGNIFICANT CHANGE UP
-  CEFEPIME: SIGNIFICANT CHANGE UP
-  CEFOXITIN: SIGNIFICANT CHANGE UP
-  CEFTRIAXONE: SIGNIFICANT CHANGE UP
-  CIPROFLOXACIN: SIGNIFICANT CHANGE UP
-  ERTAPENEM: SIGNIFICANT CHANGE UP
-  GENTAMICIN: SIGNIFICANT CHANGE UP
-  IMIPENEM: SIGNIFICANT CHANGE UP
-  LEVOFLOXACIN: SIGNIFICANT CHANGE UP
-  MEROPENEM: SIGNIFICANT CHANGE UP
-  NITROFURANTOIN: SIGNIFICANT CHANGE UP
-  PIPERACILLIN/TAZOBACTAM: SIGNIFICANT CHANGE UP
-  TIGECYCLINE: SIGNIFICANT CHANGE UP
-  TOBRAMYCIN: SIGNIFICANT CHANGE UP
-  TRIMETHOPRIM/SULFAMETHOXAZOLE: SIGNIFICANT CHANGE UP
ANION GAP SERPL CALC-SCNC: 11 MMOL/L — SIGNIFICANT CHANGE UP (ref 7–14)
BUN SERPL-MCNC: 19 MG/DL — SIGNIFICANT CHANGE UP (ref 7–23)
CALCIUM SERPL-MCNC: 9.3 MG/DL — SIGNIFICANT CHANGE UP (ref 8.4–10.5)
CHLORIDE SERPL-SCNC: 100 MMOL/L — SIGNIFICANT CHANGE UP (ref 98–107)
CO2 SERPL-SCNC: 28 MMOL/L — SIGNIFICANT CHANGE UP (ref 22–31)
CREAT SERPL-MCNC: 1.46 MG/DL — HIGH (ref 0.5–1.3)
CULTURE RESULTS: SIGNIFICANT CHANGE UP
GLUCOSE SERPL-MCNC: 175 MG/DL — HIGH (ref 70–99)
HCT VFR BLD CALC: 35.4 % — SIGNIFICANT CHANGE UP (ref 34.5–45)
HGB BLD-MCNC: 10.1 G/DL — LOW (ref 11.5–15.5)
MAGNESIUM SERPL-MCNC: 2.1 MG/DL — SIGNIFICANT CHANGE UP (ref 1.6–2.6)
MCHC RBC-ENTMCNC: 24.5 PG — LOW (ref 27–34)
MCHC RBC-ENTMCNC: 28.5 GM/DL — LOW (ref 32–36)
MCV RBC AUTO: 85.9 FL — SIGNIFICANT CHANGE UP (ref 80–100)
METHOD TYPE: SIGNIFICANT CHANGE UP
NRBC # BLD: 0 /100 WBCS — SIGNIFICANT CHANGE UP
NRBC # FLD: 0 K/UL — SIGNIFICANT CHANGE UP
ORGANISM # SPEC MICROSCOPIC CNT: SIGNIFICANT CHANGE UP
ORGANISM # SPEC MICROSCOPIC CNT: SIGNIFICANT CHANGE UP
PHOSPHATE SERPL-MCNC: 2.9 MG/DL — SIGNIFICANT CHANGE UP (ref 2.5–4.5)
PLATELET # BLD AUTO: 372 K/UL — SIGNIFICANT CHANGE UP (ref 150–400)
POTASSIUM SERPL-MCNC: 4 MMOL/L — SIGNIFICANT CHANGE UP (ref 3.5–5.3)
POTASSIUM SERPL-SCNC: 4 MMOL/L — SIGNIFICANT CHANGE UP (ref 3.5–5.3)
RBC # BLD: 4.12 M/UL — SIGNIFICANT CHANGE UP (ref 3.8–5.2)
RBC # FLD: 16.7 % — HIGH (ref 10.3–14.5)
SODIUM SERPL-SCNC: 139 MMOL/L — SIGNIFICANT CHANGE UP (ref 135–145)
SPECIMEN SOURCE: SIGNIFICANT CHANGE UP
WBC # BLD: 9.26 K/UL — SIGNIFICANT CHANGE UP (ref 3.8–10.5)
WBC # FLD AUTO: 9.26 K/UL — SIGNIFICANT CHANGE UP (ref 3.8–10.5)

## 2021-02-07 PROCEDURE — 99232 SBSQ HOSP IP/OBS MODERATE 35: CPT | Mod: GC

## 2021-02-07 RX ORDER — LACTULOSE 10 G/15ML
10 SOLUTION ORAL ONCE
Refills: 0 | Status: DISCONTINUED | OUTPATIENT
Start: 2021-02-07 | End: 2021-02-08

## 2021-02-07 RX ADMIN — Medication 650 MILLIGRAM(S): at 05:12

## 2021-02-07 RX ADMIN — FAMOTIDINE 40 MILLIGRAM(S): 10 INJECTION INTRAVENOUS at 12:09

## 2021-02-07 RX ADMIN — Medication 650 MILLIGRAM(S): at 12:32

## 2021-02-07 RX ADMIN — Medication 81 MILLIGRAM(S): at 12:09

## 2021-02-07 RX ADMIN — Medication 650 MILLIGRAM(S): at 21:28

## 2021-02-07 RX ADMIN — Medication 150 MILLIGRAM(S): at 05:11

## 2021-02-07 RX ADMIN — Medication 100 MILLIGRAM(S): at 21:24

## 2021-02-07 RX ADMIN — SENNA PLUS 2 TABLET(S): 8.6 TABLET ORAL at 21:24

## 2021-02-07 RX ADMIN — BUMETANIDE 0.5 MILLIGRAM(S): 0.25 INJECTION INTRAMUSCULAR; INTRAVENOUS at 05:11

## 2021-02-07 RX ADMIN — Medication 3 MILLIGRAM(S): at 21:24

## 2021-02-07 RX ADMIN — APIXABAN 5 MILLIGRAM(S): 2.5 TABLET, FILM COATED ORAL at 21:24

## 2021-02-07 RX ADMIN — AMLODIPINE BESYLATE 5 MILLIGRAM(S): 2.5 TABLET ORAL at 05:11

## 2021-02-07 RX ADMIN — Medication 2: at 12:09

## 2021-02-07 RX ADMIN — ATORVASTATIN CALCIUM 40 MILLIGRAM(S): 80 TABLET, FILM COATED ORAL at 21:24

## 2021-02-07 RX ADMIN — APIXABAN 5 MILLIGRAM(S): 2.5 TABLET, FILM COATED ORAL at 05:11

## 2021-02-07 RX ADMIN — POLYETHYLENE GLYCOL 3350 17 GRAM(S): 17 POWDER, FOR SOLUTION ORAL at 05:11

## 2021-02-07 RX ADMIN — Medication 1: at 08:40

## 2021-02-07 NOTE — PROGRESS NOTE ADULT - PROBLEM SELECTOR PLAN 9
On tele because she had 20 beats of asymp Vtach--> keep on for 48-72 hrs and if uneventful can d/c tele  CC/DASH diet  DVT PPx: on eliquis  Full code

## 2021-02-07 NOTE — PROGRESS NOTE ADULT - SUBJECTIVE AND OBJECTIVE BOX
PROGRESS NOTE:   Cristyhira Hammad, PGY-3  [facility] [id number]  [facility] [phone]    Patient is a 80y old  Female who presents with a chief complaint of RLE swelling and redness (06 Feb 2021 15:55)      SUBJECTIVE / OVERNIGHT EVENTS:    ADDITIONAL REVIEW OF SYSTEMS:    MEDICATIONS  (STANDING):  amLODIPine   Tablet 5 milliGRAM(s) Oral daily  apixaban 5 milliGRAM(s) Oral every 12 hours  aspirin enteric coated 81 milliGRAM(s) Oral daily  atorvastatin 40 milliGRAM(s) Oral at bedtime  buMETAnide 0.5 milliGRAM(s) Oral daily  ceFAZolin   IVPB 1000 milliGRAM(s) IV Intermittent every 24 hours  dextrose 40% Gel 15 Gram(s) Oral once  dextrose 5%. 1000 milliLiter(s) (50 mL/Hr) IV Continuous <Continuous>  dextrose 5%. 1000 milliLiter(s) (100 mL/Hr) IV Continuous <Continuous>  dextrose 50% Injectable 25 Gram(s) IV Push once  dextrose 50% Injectable 12.5 Gram(s) IV Push once  dextrose 50% Injectable 25 Gram(s) IV Push once  famotidine    Tablet 40 milliGRAM(s) Oral daily  glucagon  Injectable 1 milliGRAM(s) IntraMuscular once  insulin lispro (ADMELOG) corrective regimen sliding scale   SubCutaneous three times a day before meals  insulin lispro (ADMELOG) corrective regimen sliding scale   SubCutaneous at bedtime  melatonin 3 milliGRAM(s) Oral at bedtime  metoprolol succinate  milliGRAM(s) Oral daily  polyethylene glycol 3350 17 Gram(s) Oral two times a day  senna 2 Tablet(s) Oral at bedtime    MEDICATIONS  (PRN):  acetaminophen   Tablet .. 650 milliGRAM(s) Oral every 6 hours PRN Mild Pain (1 - 3), Moderate Pain (4 - 6)  bisacodyl Suppository 10 milliGRAM(s) Rectal daily PRN Constipation  oxyCODONE    IR 5 milliGRAM(s) Oral every 6 hours PRN Severe Pain (7 - 10)      CAPILLARY BLOOD GLUCOSE      POCT Blood Glucose.: 93 mg/dL (06 Feb 2021 21:13)  POCT Blood Glucose.: 160 mg/dL (06 Feb 2021 17:05)  POCT Blood Glucose.: 156 mg/dL (06 Feb 2021 12:08)  POCT Blood Glucose.: 185 mg/dL (06 Feb 2021 07:59)    I&O's Summary    06 Feb 2021 07:01  -  07 Feb 2021 07:00  --------------------------------------------------------  IN: 286 mL / OUT: 500 mL / NET: -214 mL        PHYSICAL EXAM:  Vital Signs Last 24 Hrs  T(C): 36.6 (07 Feb 2021 05:07), Max: 36.7 (06 Feb 2021 20:24)  T(F): 97.9 (07 Feb 2021 05:07), Max: 98.1 (06 Feb 2021 20:24)  HR: 78 (07 Feb 2021 05:07) (72 - 78)  BP: 131/60 (07 Feb 2021 05:07) (131/60 - 142/60)  BP(mean): --  RR: 18 (07 Feb 2021 05:07) (18 - 24)  SpO2: 95% (07 Feb 2021 05:07) (94% - 99%)    CONSTITUTIONAL: NAD, well-developed  RESPIRATORY: Normal respiratory effort; lungs are clear to auscultation bilaterally  CARDIOVASCULAR: Regular rate and rhythm, normal S1 and S2, no murmur/rub/gallop; No lower extremity edema; Peripheral pulses are 2+ bilaterally  ABDOMEN: Nontender to palpation, normoactive bowel sounds, no rebound/guarding; No hepatosplenomegaly  MUSCLOSKELETAL: no clubbing or cyanosis of digits; no joint swelling or tenderness to palpation  PSYCH: A+O to person, place, and time; affect appropriate    LABS:                        10.1   9.26  )-----------( 372      ( 07 Feb 2021 05:22 )             35.4     02-07    139  |  100  |  19  ----------------------------<  175<H>  4.0   |  28  |  1.46<H>    Ca    9.3      07 Feb 2021 05:22  Phos  2.9     02-07  Mg     2.1     02-07    TPro  7.4  /  Alb  3.9  /  TBili  0.3  /  DBili  x   /  AST  14  /  ALT  8   /  AlkPhos  98  02-05              Culture - Urine (collected 05 Feb 2021 08:16)  Source: .Urine Catheterized  Preliminary Report (06 Feb 2021 20:02):    >100,000 CFU/ml Klebsiella pneumoniae    Culture - Blood (collected 05 Feb 2021 07:14)  Source: .Blood Blood-Peripheral  Preliminary Report (06 Feb 2021 08:01):    No growth to date.    Culture - Blood (collected 05 Feb 2021 06:59)  Source: .Blood Blood-Venous  Preliminary Report (06 Feb 2021 07:02):    No growth to date.        RADIOLOGY & ADDITIONAL TESTS:  Results Reviewed:   Imaging Personally Reviewed:  Electrocardiogram Personally Reviewed:    COORDINATION OF CARE:  Care Discussed with Consultants/Other Providers [Y/N]:  Prior or Outpatient Records Reviewed [Y/N]:   PROGRESS NOTE:   Batool Salguerooor, PGY-3  [facility] [id number]  [facility] [phone]    Patient is a 80y old  Female who presents with a chief complaint of RLE swelling and redness (06 Feb 2021 15:55)      SUBJECTIVE / OVERNIGHT EVENTS: No acute events overnight.  Patient had 1 small bowel movement.  Denies any fevers/chills/night sweats.  on tele, NSR to 70s; some PVCs     ADDITIONAL REVIEW OF SYSTEMS:    MEDICATIONS  (STANDING):  amLODIPine   Tablet 5 milliGRAM(s) Oral daily  apixaban 5 milliGRAM(s) Oral every 12 hours  aspirin enteric coated 81 milliGRAM(s) Oral daily  atorvastatin 40 milliGRAM(s) Oral at bedtime  buMETAnide 0.5 milliGRAM(s) Oral daily  ceFAZolin   IVPB 1000 milliGRAM(s) IV Intermittent every 24 hours  dextrose 40% Gel 15 Gram(s) Oral once  dextrose 5%. 1000 milliLiter(s) (50 mL/Hr) IV Continuous <Continuous>  dextrose 5%. 1000 milliLiter(s) (100 mL/Hr) IV Continuous <Continuous>  dextrose 50% Injectable 25 Gram(s) IV Push once  dextrose 50% Injectable 12.5 Gram(s) IV Push once  dextrose 50% Injectable 25 Gram(s) IV Push once  famotidine    Tablet 40 milliGRAM(s) Oral daily  glucagon  Injectable 1 milliGRAM(s) IntraMuscular once  insulin lispro (ADMELOG) corrective regimen sliding scale   SubCutaneous three times a day before meals  insulin lispro (ADMELOG) corrective regimen sliding scale   SubCutaneous at bedtime  melatonin 3 milliGRAM(s) Oral at bedtime  metoprolol succinate  milliGRAM(s) Oral daily  polyethylene glycol 3350 17 Gram(s) Oral two times a day  senna 2 Tablet(s) Oral at bedtime    MEDICATIONS  (PRN):  acetaminophen   Tablet .. 650 milliGRAM(s) Oral every 6 hours PRN Mild Pain (1 - 3), Moderate Pain (4 - 6)  bisacodyl Suppository 10 milliGRAM(s) Rectal daily PRN Constipation  oxyCODONE    IR 5 milliGRAM(s) Oral every 6 hours PRN Severe Pain (7 - 10)      CAPILLARY BLOOD GLUCOSE      POCT Blood Glucose.: 93 mg/dL (06 Feb 2021 21:13)  POCT Blood Glucose.: 160 mg/dL (06 Feb 2021 17:05)  POCT Blood Glucose.: 156 mg/dL (06 Feb 2021 12:08)  POCT Blood Glucose.: 185 mg/dL (06 Feb 2021 07:59)    I&O's Summary    06 Feb 2021 07:01  -  07 Feb 2021 07:00  --------------------------------------------------------  IN: 286 mL / OUT: 500 mL / NET: -214 mL        PHYSICAL EXAM:  Vital Signs Last 24 Hrs  T(C): 36.6 (07 Feb 2021 05:07), Max: 36.7 (06 Feb 2021 20:24)  T(F): 97.9 (07 Feb 2021 05:07), Max: 98.1 (06 Feb 2021 20:24)  HR: 78 (07 Feb 2021 05:07) (72 - 78)  BP: 131/60 (07 Feb 2021 05:07) (131/60 - 142/60)  BP(mean): --  RR: 18 (07 Feb 2021 05:07) (18 - 24)  SpO2: 95% (07 Feb 2021 05:07) (94% - 99%)    GENERAL: No acute distress, well-developed  HEAD:  Atraumatic, Normocephalic  EYES: EOMI, PERRLA, conjunctiva and sclera clear  NECK: Supple, no lymphadenopathy, no JVD  CHEST/LUNG: CTAB; No wheezes, rales, or rhonchi  HEART: Regular rate and rhythm; No murmurs, rubs, or gallops  ABDOMEN: Soft, non-tender, slightly distended; normal bowel sounds, no organomegaly  EXTREMITIES:  2+ peripheral pulses b/l, No clubbing, cyanosis, +LE edema; RUE edema > LUE   NEUROLOGY: A&O x 3, no focal deficits  SKIN: erythema on right leg appears smaller and not as hot. no open lesions noted     LABS:                        10.1   9.26  )-----------( 372      ( 07 Feb 2021 05:22 )             35.4     02-07    139  |  100  |  19  ----------------------------<  175<H>  4.0   |  28  |  1.46<H>    Ca    9.3      07 Feb 2021 05:22  Phos  2.9     02-07  Mg     2.1     02-07    TPro  7.4  /  Alb  3.9  /  TBili  0.3  /  DBili  x   /  AST  14  /  ALT  8   /  AlkPhos  98  02-05              Culture - Urine (collected 05 Feb 2021 08:16)  Source: .Urine Catheterized  Preliminary Report (06 Feb 2021 20:02):    >100,000 CFU/ml Klebsiella pneumoniae    Culture - Blood (collected 05 Feb 2021 07:14)  Source: .Blood Blood-Peripheral  Preliminary Report (06 Feb 2021 08:01):    No growth to date.    Culture - Blood (collected 05 Feb 2021 06:59)  Source: .Blood Blood-Venous  Preliminary Report (06 Feb 2021 07:02):    No growth to date.        RADIOLOGY & ADDITIONAL TESTS:  Results Reviewed:   Imaging Personally Reviewed:  Electrocardiogram Personally Reviewed:    COORDINATION OF CARE:  Care Discussed with Consultants/Other Providers [Y/N]:  Prior or Outpatient Records Reviewed [Y/N]:

## 2021-02-07 NOTE — PROGRESS NOTE ADULT - ASSESSMENT
80 year old Moh[place] speaking female with hx of HFpEF, CAD s/p CABG in Nov 2020, R breast cancer (1980s) s/p mastectomy with chronic RUE lymphedema, colon cancer s/p resection, rectal cancer (2015) s/p chemo and radiation, HTN, HLD, T2DM, DVT (2019) on eliquis, renal insufficiency, and obesity, presenting with complaint of RLE swelling and redness and hallucinations x2-3 days, admitted for RLE cellulitis     80 year old [place] speaking female with hx of HFpEF, CAD s/p CABG in Nov 2020, R breast cancer (1980s) s/p mastectomy with chronic RUE lymphedema, colon cancer s/p resection, rectal cancer (2015) s/p chemo and radiation, HTN, HLD, T2DM, DVT (2019) on eliquis, renal insufficiency, and obesity, presenting with complaint of RLE swelling and redness and hallucinations x2-3 days, admitted for RLE cellulitis, now clinically improving.

## 2021-02-07 NOTE — PROGRESS NOTE ADULT - PROBLEM SELECTOR PLAN 6
Hx RLE DVT 2019, on eliquis  -F/u LE dopplers, will premedicate with pain meds  -C/w eliquis but dose was increased from 2.5 to a theraputic dose of 5mg bid due to hx of DVT Hx RLE DVT 2019, on eliquis  -dopplers currently negative for DVT  -C/w eliquis but dose was increased from 2.5 to a therapeutic dose of 5mg bid due to hx of DVT

## 2021-02-07 NOTE — PROGRESS NOTE ADULT - PROBLEM SELECTOR PLAN 1
Worsening right lower extremity redness and swelling over the past 1-2 days, aw/ tenderness, R>L. Redness and warmth of RLE on PE, no purulence  -Right leg xrays with normal arthroplasty and no evidence of erosion  -S/p clindamycin in the ED  -C/w cefazolin for now for RLE cellulitis, appreciate ID recs  -Hx penicillin allergy. Pt states she had facial swelling many years ago, cannot say how long ago, but also states she has had PCN since then without issue. Also received cephalosporin on prior admission without allergic reaction  -Monitor closely for allergic reaction while on cefazolin  -F/u LE doppler studies (will repeat with premedication as initial study nondiagnostic as patient unable to tolerate compression)  - blood cultures NGTD so far   -appreciate ortho input, hx TKA, they are not suspicious for septic joint at this time, knee xray normal so no interventions needed at this time Worsening right lower extremity redness and swelling over the past 1-2 days, aw/ tenderness, R>L. Redness and warmth of RLE on PE, no purulence  -Right leg xrays with normal arthroplasty and no evidence of erosion  -S/p clindamycin in the ED  -C/w cefazolin for now for RLE cellulitis, appreciate ID recs  -Hx penicillin allergy. Pt states she had facial swelling many years ago, cannot say how long ago, but also states she has had PCN since then without issue. Also received cephalosporin on prior admission without allergic reaction  -Monitor closely for allergic reaction while on cefazolin  -LE dopplers negative for DVT  - blood cultures NGTD so far   -appreciate ortho input, hx TKA, they are not suspicious for septic joint at this time, knee xray normal so no interventions needed at this time

## 2021-02-08 ENCOUNTER — TRANSCRIPTION ENCOUNTER (OUTPATIENT)
Age: 81
End: 2021-02-08

## 2021-02-08 VITALS
OXYGEN SATURATION: 96 % | HEART RATE: 74 BPM | DIASTOLIC BLOOD PRESSURE: 83 MMHG | RESPIRATION RATE: 18 BRPM | SYSTOLIC BLOOD PRESSURE: 152 MMHG | TEMPERATURE: 98 F

## 2021-02-08 DIAGNOSIS — I50.9 HEART FAILURE, UNSPECIFIED: ICD-10-CM

## 2021-02-08 LAB
ANION GAP SERPL CALC-SCNC: 11 MMOL/L — SIGNIFICANT CHANGE UP (ref 7–14)
BUN SERPL-MCNC: 21 MG/DL — SIGNIFICANT CHANGE UP (ref 7–23)
CALCIUM SERPL-MCNC: 9.4 MG/DL — SIGNIFICANT CHANGE UP (ref 8.4–10.5)
CHLORIDE SERPL-SCNC: 101 MMOL/L — SIGNIFICANT CHANGE UP (ref 98–107)
CO2 SERPL-SCNC: 27 MMOL/L — SIGNIFICANT CHANGE UP (ref 22–31)
CREAT SERPL-MCNC: 1.36 MG/DL — HIGH (ref 0.5–1.3)
GLUCOSE SERPL-MCNC: 168 MG/DL — HIGH (ref 70–99)
HCT VFR BLD CALC: 35.8 % — SIGNIFICANT CHANGE UP (ref 34.5–45)
HGB BLD-MCNC: 10.3 G/DL — LOW (ref 11.5–15.5)
MAGNESIUM SERPL-MCNC: 2.1 MG/DL — SIGNIFICANT CHANGE UP (ref 1.6–2.6)
MCHC RBC-ENTMCNC: 24.9 PG — LOW (ref 27–34)
MCHC RBC-ENTMCNC: 28.8 GM/DL — LOW (ref 32–36)
MCV RBC AUTO: 86.7 FL — SIGNIFICANT CHANGE UP (ref 80–100)
NRBC # BLD: 0 /100 WBCS — SIGNIFICANT CHANGE UP
NRBC # FLD: 0 K/UL — SIGNIFICANT CHANGE UP
PHOSPHATE SERPL-MCNC: 3.5 MG/DL — SIGNIFICANT CHANGE UP (ref 2.5–4.5)
PLATELET # BLD AUTO: 361 K/UL — SIGNIFICANT CHANGE UP (ref 150–400)
POTASSIUM SERPL-MCNC: 3.8 MMOL/L — SIGNIFICANT CHANGE UP (ref 3.5–5.3)
POTASSIUM SERPL-SCNC: 3.8 MMOL/L — SIGNIFICANT CHANGE UP (ref 3.5–5.3)
RBC # BLD: 4.13 M/UL — SIGNIFICANT CHANGE UP (ref 3.8–5.2)
RBC # FLD: 16.2 % — HIGH (ref 10.3–14.5)
SODIUM SERPL-SCNC: 139 MMOL/L — SIGNIFICANT CHANGE UP (ref 135–145)
WBC # BLD: 7.67 K/UL — SIGNIFICANT CHANGE UP (ref 3.8–10.5)
WBC # FLD AUTO: 7.67 K/UL — SIGNIFICANT CHANGE UP (ref 3.8–10.5)

## 2021-02-08 PROCEDURE — 99239 HOSP IP/OBS DSCHRG MGMT >30: CPT

## 2021-02-08 RX ORDER — APIXABAN 2.5 MG/1
1 TABLET, FILM COATED ORAL
Qty: 60 | Refills: 0
Start: 2021-02-08 | End: 2021-03-09

## 2021-02-08 RX ORDER — APIXABAN 2.5 MG/1
1 TABLET, FILM COATED ORAL
Qty: 0 | Refills: 0 | DISCHARGE

## 2021-02-08 RX ORDER — CEPHALEXIN 500 MG
1 CAPSULE ORAL
Qty: 20 | Refills: 0
Start: 2021-02-08 | End: 2021-02-12

## 2021-02-08 RX ADMIN — Medication 1: at 08:14

## 2021-02-08 RX ADMIN — Medication 650 MILLIGRAM(S): at 12:06

## 2021-02-08 RX ADMIN — APIXABAN 5 MILLIGRAM(S): 2.5 TABLET, FILM COATED ORAL at 04:59

## 2021-02-08 RX ADMIN — AMLODIPINE BESYLATE 5 MILLIGRAM(S): 2.5 TABLET ORAL at 04:59

## 2021-02-08 RX ADMIN — Medication 81 MILLIGRAM(S): at 12:05

## 2021-02-08 RX ADMIN — Medication 1: at 12:02

## 2021-02-08 RX ADMIN — FAMOTIDINE 40 MILLIGRAM(S): 10 INJECTION INTRAVENOUS at 12:07

## 2021-02-08 RX ADMIN — BUMETANIDE 0.5 MILLIGRAM(S): 0.25 INJECTION INTRAMUSCULAR; INTRAVENOUS at 04:59

## 2021-02-08 RX ADMIN — POLYETHYLENE GLYCOL 3350 17 GRAM(S): 17 POWDER, FOR SOLUTION ORAL at 04:59

## 2021-02-08 RX ADMIN — Medication 150 MILLIGRAM(S): at 04:59

## 2021-02-08 NOTE — DISCHARGE NOTE NURSING/CASE MANAGEMENT/SOCIAL WORK - PATIENT PORTAL LINK FT
You can access the FollowMyHealth Patient Portal offered by [facility] by registering at the following website: [email or web address]. By joining [facility]’s FollowMyHealth portal, you will also be able to view your health information using other applications (apps) compatible with our system.

## 2021-02-08 NOTE — PROGRESS NOTE ADULT - PROVIDER SPECIALTY LIST ADULT
Infectious Disease
Nephrology
Internal Medicine

## 2021-02-08 NOTE — PROGRESS NOTE ADULT - SUBJECTIVE AND OBJECTIVE BOX
Overnight events noted   VITAL: T(C): , Max: 37.1 (02-07-21 @ 13:05) T(F): , Max: 98.8 (02-07-21 @ 13:05) HR: 70 (02-08-21 @ 04:57) BP: 134/80 (02-08-21 @ 04:57) BP(mean): -- RR: 18 (02-08-21 @ 04:57) SpO2: 95% (02-08-21 @ 04:57)   PHYSICAL EXAM: Constitutional: NAD, Alert HEENT: NCAT, DMM Neck: Supple, No JVD Respiratory: CTA-b/l Cardiovascular: RRR s1s2, no m/r/g Gastrointestinal: BS+, soft, NT/ND Extremities: R>L LE edema; (+)large RUE nonpitting edema Neurological: no focal deficits; strength grossly intact Back: no CVAT b/l  LABS:                      10.3  7.67  )-----------( 361      ( 08 Feb 2021 05:36 )            35.8   Na(139)/K(3.8)/Cl(101)/HCO3(27)/BUN(21)/Cr(1.36)Glu(168)/Ca(9.4)/Mg(2.1)/PO4(3.5)    02-08 @ 05:36 Na(139)/K(4.0)/Cl(100)/HCO3(28)/BUN(19)/Cr(1.46)Glu(175)/Ca(9.3)/Mg(2.1)/PO4(2.9)    02-07 @ 05:22 Na(141)/K(3.9)/Cl(101)/HCO3(28)/BUN(16)/Cr(1.39)Glu(178)/Ca(9.5)/Mg(2.2)/PO4(3.0)    02-06 @ 07:37 Na(139)/K(3.6)/Cl(100)/HCO3(27)/BUN(20)/Cr(1.48)Glu(218)/Ca(9.2)/Mg(2.2)/PO4(2.8)    02-05 @ 15:12   IMPRESSION: 80F w/ HTN, DM2, CKD3, CAD-CABG, DVT, and past breast+rectal CA, 2/4/21 a/w RLE cellulitis  (1)Renal - nonproteinuric CKD3 - at/near baseline GFR (2)Lytes - highly acceptable for now (3)CV - acceptable BP/volume for now (4)ID - RLE cellulitis - on IV Ancef  RECOMMEND: (1)Antihypertensives/diuretics as ordered (2)Continue abx for GFR 40-50ml/min (3)F/U with Dr. Igor Kan 1-2 months after discharge     Ignacio Morataya MD [facility] Office: [phone] Cell: [phone]        No complaints   VITAL: T(C): , Max: 37.1 (02-07-21 @ 13:05) T(F): , Max: 98.8 (02-07-21 @ 13:05) HR: 70 (02-08-21 @ 04:57) BP: 134/80 (02-08-21 @ 04:57) BP(mean): -- RR: 18 (02-08-21 @ 04:57) SpO2: 95% (02-08-21 @ 04:57)   PHYSICAL EXAM: Constitutional: NAD, Alert HEENT: NCAT, DMM Neck: Supple, No JVD Respiratory: CTA-b/l Cardiovascular: RRR s1s2, no m/r/g Gastrointestinal: BS+, soft, NT/ND Extremities: R>L LE edema; (+)large RUE nonpitting edema Derm: RLE erythema improving Neurological: no focal deficits; strength grossly intact Back: no CVAT b/l  LABS:                      10.3  7.67  )-----------( 361      ( 08 Feb 2021 05:36 )            35.8   Na(139)/K(3.8)/Cl(101)/HCO3(27)/BUN(21)/Cr(1.36)Glu(168)/Ca(9.4)/Mg(2.1)/PO4(3.5)    02-08 @ 05:36 Na(139)/K(4.0)/Cl(100)/HCO3(28)/BUN(19)/Cr(1.46)Glu(175)/Ca(9.3)/Mg(2.1)/PO4(2.9)    02-07 @ 05:22 Na(141)/K(3.9)/Cl(101)/HCO3(28)/BUN(16)/Cr(1.39)Glu(178)/Ca(9.5)/Mg(2.2)/PO4(3.0)    02-06 @ 07:37 Na(139)/K(3.6)/Cl(100)/HCO3(27)/BUN(20)/Cr(1.48)Glu(218)/Ca(9.2)/Mg(2.2)/PO4(2.8)    02-05 @ 15:12   IMPRESSION: 80F w/ HTN, DM2, CKD3, CAD-CABG, DVT, and past breast+rectal CA, 2/4/21 a/w RLE cellulitis  (1)Renal - nonproteinuric CKD3 - at/near baseline GFR (2)Lytes - highly acceptable for now (3)CV - acceptable BP/volume for now (4)ID - RLE cellulitis - on IV Ancef - resolving  RECOMMEND: (1)Antihypertensives/diuretics as ordered (2)Continue abx for GFR 40-50ml/min (3)F/U with Dr. Igor Kan 1-2 months after discharge     Ignacio Morataya MD [facility] Office: [phone] Cell: [phone]

## 2021-02-08 NOTE — PROGRESS NOTE ADULT - PROBLEM SELECTOR PLAN 5
Hx RLE DVT 2019, on eliquis  -dopplers currently negative for DVT  -C/w eliquis but dose was increased from 2.5 to a therapeutic dose of 5mg bid due to hx of DVT

## 2021-02-08 NOTE — PROGRESS NOTE ADULT - SUBJECTIVE AND OBJECTIVE BOX
PROGRESS NOTE:   Authored by Dr. Lisbet Kramer, VIKA pager [phone]    Patient is a 80y old  Female who presents with a chief complaint of RLE swelling and redness (08 Feb 2021 08:12)      SUBJECTIVE / OVERNIGHT EVENTS: no events overnight. The patient appeared well this morning. She denies pain in the right leg and states that it feels much better. She notes that the swelling and reddness has decreased. She denies SOB, abd pain, chest pain, n/v/d/c, fever, chills     MEDICATIONS  (STANDING):  amLODIPine   Tablet 5 milliGRAM(s) Oral daily  apixaban 5 milliGRAM(s) Oral every 12 hours  aspirin enteric coated 81 milliGRAM(s) Oral daily  atorvastatin 40 milliGRAM(s) Oral at bedtime  buMETAnide 0.5 milliGRAM(s) Oral daily  ceFAZolin   IVPB 1000 milliGRAM(s) IV Intermittent every 24 hours  dextrose 40% Gel 15 Gram(s) Oral once  dextrose 5%. 1000 milliLiter(s) (50 mL/Hr) IV Continuous <Continuous>  dextrose 5%. 1000 milliLiter(s) (100 mL/Hr) IV Continuous <Continuous>  dextrose 50% Injectable 25 Gram(s) IV Push once  dextrose 50% Injectable 12.5 Gram(s) IV Push once  dextrose 50% Injectable 25 Gram(s) IV Push once  famotidine    Tablet 40 milliGRAM(s) Oral daily  glucagon  Injectable 1 milliGRAM(s) IntraMuscular once  insulin lispro (ADMELOG) corrective regimen sliding scale   SubCutaneous three times a day before meals  insulin lispro (ADMELOG) corrective regimen sliding scale   SubCutaneous at bedtime  lactulose Syrup 10 Gram(s) Oral once  melatonin 3 milliGRAM(s) Oral at bedtime  metoprolol succinate  milliGRAM(s) Oral daily  polyethylene glycol 3350 17 Gram(s) Oral two times a day  senna 2 Tablet(s) Oral at bedtime    MEDICATIONS  (PRN):  acetaminophen   Tablet .. 650 milliGRAM(s) Oral every 6 hours PRN Mild Pain (1 - 3), Moderate Pain (4 - 6)  bisacodyl Suppository 10 milliGRAM(s) Rectal daily PRN Constipation      CAPILLARY BLOOD GLUCOSE      POCT Blood Glucose.: 163 mg/dL (08 Feb 2021 11:33)  POCT Blood Glucose.: 178 mg/dL (08 Feb 2021 07:39)  POCT Blood Glucose.: 216 mg/dL (07 Feb 2021 20:30)  POCT Blood Glucose.: 142 mg/dL (07 Feb 2021 16:24)    I&O's Summary    07 Feb 2021 07:01  -  08 Feb 2021 07:00  --------------------------------------------------------  IN: 840 mL / OUT: 1050 mL / NET: -210 mL    08 Feb 2021 07:01  -  08 Feb 2021 11:46  --------------------------------------------------------  IN: 240 mL / OUT: 1000 mL / NET: -760 mL        PHYSICAL EXAM:  Vital Signs Last 24 Hrs  T(C): 36.9 (08 Feb 2021 04:57), Max: 37.1 (07 Feb 2021 13:05)  T(F): 98.5 (08 Feb 2021 04:57), Max: 98.8 (07 Feb 2021 13:05)  HR: 70 (08 Feb 2021 04:57) (70 - 79)  BP: 134/80 (08 Feb 2021 04:57) (121/80 - 134/80)  BP(mean): --  RR: 18 (08 Feb 2021 04:57) (17 - 18)  SpO2: 95% (08 Feb 2021 04:57) (95% - 96%)    GENERAL: No acute distress, well-developed  HEAD:  Atraumatic, Normocephalic  EYES: EOMI, PERRLA, conjunctiva and sclera clear  NECK: Supple, no lymphadenopathy, no JVD  CHEST/LUNG: CTAB; No wheezes, rales, or rhonchi  HEART: Regular rate and rhythm; No murmurs, rubs, or gallops  ABDOMEN: Soft, non-tender, non-distended; normal bowel sounds, no organomegaly  EXTREMITIES:  2+ peripheral pulses b/l, No clubbing, cyanosis, or edema  NEUROLOGY: A&O x 3, no focal deficits  SKIN:     LABS:                        10.3   7.67  )-----------( 361      ( 08 Feb 2021 05:36 )             35.8     02-08    139  |  101  |  21  ----------------------------<  168<H>  3.8   |  27  |  1.36<H>    Ca    9.4      08 Feb 2021 05:36  Phos  3.5     02-08  Mg     2.1     02-08                  RADIOLOGY & ADDITIONAL TESTS:  Results Reviewed:   Imaging Personally Reviewed:  Electrocardiogram Personally Reviewed:    COORDINATION OF CARE:  Care Discussed with Consultants/Other Providers [Y/N]:  Prior or Outpatient Records Reviewed [Y/N]:   PROGRESS NOTE:   Authored by Dr. Lisbet Kramer, VIKA pager [phone]    Patient is a 80y old  Female who presents with a chief complaint of RLE swelling and redness (08 Feb 2021 08:12)      SUBJECTIVE / OVERNIGHT EVENTS: no events overnight. The patient appeared well this morning. She denies pain in the right leg and states that it feels much better. She notes that the swelling and reddness has decreased. She denies SOB, abd pain, chest pain, n/v/d/c, fever, chills     MEDICATIONS  (STANDING):  amLODIPine   Tablet 5 milliGRAM(s) Oral daily  apixaban 5 milliGRAM(s) Oral every 12 hours  aspirin enteric coated 81 milliGRAM(s) Oral daily  atorvastatin 40 milliGRAM(s) Oral at bedtime  buMETAnide 0.5 milliGRAM(s) Oral daily  ceFAZolin   IVPB 1000 milliGRAM(s) IV Intermittent every 24 hours  dextrose 40% Gel 15 Gram(s) Oral once  dextrose 5%. 1000 milliLiter(s) (50 mL/Hr) IV Continuous <Continuous>  dextrose 5%. 1000 milliLiter(s) (100 mL/Hr) IV Continuous <Continuous>  dextrose 50% Injectable 25 Gram(s) IV Push once  dextrose 50% Injectable 12.5 Gram(s) IV Push once  dextrose 50% Injectable 25 Gram(s) IV Push once  famotidine    Tablet 40 milliGRAM(s) Oral daily  glucagon  Injectable 1 milliGRAM(s) IntraMuscular once  insulin lispro (ADMELOG) corrective regimen sliding scale   SubCutaneous three times a day before meals  insulin lispro (ADMELOG) corrective regimen sliding scale   SubCutaneous at bedtime  lactulose Syrup 10 Gram(s) Oral once  melatonin 3 milliGRAM(s) Oral at bedtime  metoprolol succinate  milliGRAM(s) Oral daily  polyethylene glycol 3350 17 Gram(s) Oral two times a day  senna 2 Tablet(s) Oral at bedtime    MEDICATIONS  (PRN):  acetaminophen   Tablet .. 650 milliGRAM(s) Oral every 6 hours PRN Mild Pain (1 - 3), Moderate Pain (4 - 6)  bisacodyl Suppository 10 milliGRAM(s) Rectal daily PRN Constipation      CAPILLARY BLOOD GLUCOSE      POCT Blood Glucose.: 163 mg/dL (08 Feb 2021 11:33)  POCT Blood Glucose.: 178 mg/dL (08 Feb 2021 07:39)  POCT Blood Glucose.: 216 mg/dL (07 Feb 2021 20:30)  POCT Blood Glucose.: 142 mg/dL (07 Feb 2021 16:24)    I&O's Summary    07 Feb 2021 07:01  -  08 Feb 2021 07:00  --------------------------------------------------------  IN: 840 mL / OUT: 1050 mL / NET: -210 mL    08 Feb 2021 07:01  -  08 Feb 2021 11:46  --------------------------------------------------------  IN: 240 mL / OUT: 1000 mL / NET: -760 mL        PHYSICAL EXAM:  Vital Signs Last 24 Hrs  T(C): 36.9 (08 Feb 2021 04:57), Max: 37.1 (07 Feb 2021 13:05)  T(F): 98.5 (08 Feb 2021 04:57), Max: 98.8 (07 Feb 2021 13:05)  HR: 70 (08 Feb 2021 04:57) (70 - 79)  BP: 134/80 (08 Feb 2021 04:57) (121/80 - 134/80)  BP(mean): --  RR: 18 (08 Feb 2021 04:57) (17 - 18)  SpO2: 95% (08 Feb 2021 04:57) (95% - 96%)    GENERAL: No acute distress, well-developed  HEAD:  Atraumatic, Normocephalic  EYES: EOMI, PERRLA, conjunctiva and sclera clear  NECK: Supple, no lymphadenopathy, no JVD  CHEST/LUNG: CTAB; No wheezes, rales, or rhonchi  HEART: Regular rate and rhythm; No murmurs, rubs, or gallops  ABDOMEN: Soft, non-tender, non-distended; normal bowel sounds, no organomegaly  EXTREMITIES:  2+ peripheral pulses b/l, No clubbing, cyanosis, or edema  NEUROLOGY: A&O x 3, no focal deficits  SKIN: right leg cellulitis decrease in size and less erythredematous not hot to touch     LABS:                        10.3   7.67  )-----------( 361      ( 08 Feb 2021 05:36 )             35.8     02-08    139  |  101  |  21  ----------------------------<  168<H>  3.8   |  27  |  1.36<H>    Ca    9.4      08 Feb 2021 05:36  Phos  3.5     02-08  Mg     2.1     02-08                  RADIOLOGY & ADDITIONAL TESTS:  Results Reviewed:   Imaging Personally Reviewed:  Electrocardiogram Personally Reviewed:    COORDINATION OF CARE:  Care Discussed with Consultants/Other Providers [Y/N]:  Prior or Outpatient Records Reviewed [Y/N]:   PROGRESS NOTE:   Authored by Dr. Lisbet Kramer, VIKA pager [phone]    Patient is a 80y old  Female who presents with a chief complaint of RLE swelling and redness (08 Feb 2021 08:12)      SUBJECTIVE / OVERNIGHT EVENTS: no events overnight. The patient appeared well this morning. She denies pain in the right leg and states that it feels much better. She notes that the swelling and reddness has decreased. She denies SOB, abd pain, chest pain, n/v/d/c, fever, chills     MEDICATIONS  (STANDING):  amLODIPine   Tablet 5 milliGRAM(s) Oral daily  apixaban 5 milliGRAM(s) Oral every 12 hours  aspirin enteric coated 81 milliGRAM(s) Oral daily  atorvastatin 40 milliGRAM(s) Oral at bedtime  buMETAnide 0.5 milliGRAM(s) Oral daily  ceFAZolin   IVPB 1000 milliGRAM(s) IV Intermittent every 24 hours  dextrose 40% Gel 15 Gram(s) Oral once  dextrose 5%. 1000 milliLiter(s) (50 mL/Hr) IV Continuous <Continuous>  dextrose 5%. 1000 milliLiter(s) (100 mL/Hr) IV Continuous <Continuous>  dextrose 50% Injectable 25 Gram(s) IV Push once  dextrose 50% Injectable 12.5 Gram(s) IV Push once  dextrose 50% Injectable 25 Gram(s) IV Push once  famotidine    Tablet 40 milliGRAM(s) Oral daily  glucagon  Injectable 1 milliGRAM(s) IntraMuscular once  insulin lispro (ADMELOG) corrective regimen sliding scale   SubCutaneous three times a day before meals  insulin lispro (ADMELOG) corrective regimen sliding scale   SubCutaneous at bedtime  lactulose Syrup 10 Gram(s) Oral once  melatonin 3 milliGRAM(s) Oral at bedtime  metoprolol succinate  milliGRAM(s) Oral daily  polyethylene glycol 3350 17 Gram(s) Oral two times a day  senna 2 Tablet(s) Oral at bedtime    MEDICATIONS  (PRN):  acetaminophen   Tablet .. 650 milliGRAM(s) Oral every 6 hours PRN Mild Pain (1 - 3), Moderate Pain (4 - 6)  bisacodyl Suppository 10 milliGRAM(s) Rectal daily PRN Constipation      CAPILLARY BLOOD GLUCOSE      POCT Blood Glucose.: 163 mg/dL (08 Feb 2021 11:33)  POCT Blood Glucose.: 178 mg/dL (08 Feb 2021 07:39)  POCT Blood Glucose.: 216 mg/dL (07 Feb 2021 20:30)  POCT Blood Glucose.: 142 mg/dL (07 Feb 2021 16:24)    I&O's Summary    07 Feb 2021 07:01  -  08 Feb 2021 07:00  --------------------------------------------------------  IN: 840 mL / OUT: 1050 mL / NET: -210 mL    08 Feb 2021 07:01  -  08 Feb 2021 11:46  --------------------------------------------------------  IN: 240 mL / OUT: 1000 mL / NET: -760 mL        PHYSICAL EXAM:  Vital Signs Last 24 Hrs  T(C): 36.9 (08 Feb 2021 04:57), Max: 37.1 (07 Feb 2021 13:05)  T(F): 98.5 (08 Feb 2021 04:57), Max: 98.8 (07 Feb 2021 13:05)  HR: 70 (08 Feb 2021 04:57) (70 - 79)  BP: 134/80 (08 Feb 2021 04:57) (121/80 - 134/80)  BP(mean): --  RR: 18 (08 Feb 2021 04:57) (17 - 18)  SpO2: 95% (08 Feb 2021 04:57) (95% - 96%)    GENERAL: No acute distress, well-developed  HEAD:  Atraumatic, Normocephalic  EYES: EOMI, PERRLA, conjunctiva and sclera clear  NECK: Supple, no lymphadenopathy, no JVD  CHEST/LUNG: CTAB; No wheezes, rales, or rhonchi  HEART: Regular rate and rhythm; No murmurs, rubs, or gallops  ABDOMEN: Soft, non-tender, non-distended; normal bowel sounds, no organomegaly  EXTREMITIES:  2+ peripheral pulses b/l, No clubbing, cyanosis, or edema  NEUROLOGY: A&O x 3, no focal deficits  SKIN: right leg cellulitis decrease in size and less erythredematous not hot to touch     LABS:                        10.3   7.67  )-----------( 361      ( 08 Feb 2021 05:36 )             35.8     02-08    139  |  101  |  21  ----------------------------<  168<H>  3.8   |  27  |  1.36<H>    Ca    9.4      08 Feb 2021 05:36  Phos  3.5     02-08  Mg     2.1     02-08      RADIOLOGY & ADDITIONAL TESTS:  Results Reviewed:   Imaging Personally Reviewed:  Electrocardiogram Personally Reviewed:    COORDINATION OF CARE:  Care Discussed with Consultants/Other Providers [Y/N]:  Prior or Outpatient Records Reviewed [Y/N]:

## 2021-02-08 NOTE — PROGRESS NOTE ADULT - ATTENDING COMMENTS
Dk Fonseca MD  Pager [phone]  After 5pm/weekends call [phone]
Patient seen and examined, d/w Dr. Kramer, agree with above.     81 yo F w/ HFpEF, HTN, DM2 not on insulin therapy, CKD3, CAD s/p CABG, DVT on eliquis, prior hx malignancy (breast and rectal cancer) p/w RLE edema and erythema, concerning for RLE cellulitis, also noted to have increased b/l linear opacities on CXR which can be seen in setting of pulmonary edema. ID and ortho input appreciated, will c/w IV Ancef, f/u cultures, monitor clinical response. F/u knee Xray, has prior hx of TKA but ortho not suspicious for septic joint at this time. Initial duplex non-informative as patient could not tolerate compression of veins, will repeat study with premedication to evaluate for DVT, c/w therapeutic anticoagulation at this time. S/p diuresis, patient's respiratory status appears stable on RA at this time, c/w cardiac meds. Renal input appreciated re: CKD3, egfr at/near baseline.
Patient seen and examined, d/w Dr. Kramer, agree with above.     ID input appreciated, c/w IV Ancef for RLE cellulitis, monitor clinical response. Bcx NG thus far, ortho not suspicious for septic arthritis. F/u LE duplex to evaluate for DVT. PT recs appreciated, anticipate home w/ home PT pending continued clinical improvement.
Patient seen and examined, d/w Dr. Kramer, agree with above.     Offered phone translation, patient declined today, Indonesian translation provided by MARK Zarate. Patient notes RLE erythema and swelling in much improved, will transition to PO antibiotics to complete course for RLE cellulitis. F/u PMD to monitor for resolution. Patient eager to go home, in agreement with discharge plan.
Patient seen and examined, d/w Dr. Cueva, agree with above.     Frisian ID# [id number]. Patient reports feeling much better, lower extremity redness and swelling much improved, no major complaints at this time. Dopplers negative for LE DVT. Will continue to monitor clinical response, currently on IV ancef as per ID, f/u ID recs, hopeful that will be able to transition to PO abx soon to complete course for RLE cellulitis. Anticipate home w/ home PT pending continued clinical improvement. Patient in agreement with plan.

## 2021-02-08 NOTE — PROGRESS NOTE ADULT - PROBLEM SELECTOR PLAN 4
--170/60-70  -C/w home metoprolol 150mg qd and amlodipine 5mg qd
--170/60-70  -C/w home metoprolol 150mg qd and amlodipine 5mg qd
-HgbA1c 7.2% 1/2021  -Hold home oral meds  -C/w ISS for now  -Check sugars qAC and qhs  -adjust insulin as needed (ie if persistently hyperglycemic will start small amount of basal/bolus insulin for better glycemic control)
--170/60-70  -C/w home metoprolol 150mg qd and amlodipine 5mg qd

## 2021-02-08 NOTE — PROGRESS NOTE ADULT - PROBLEM SELECTOR PROBLEM 5
Type 2 diabetes mellitus with chronic kidney disease, without long-term current use of insulin, unspecified CKD stage
DVT (deep venous thrombosis)

## 2021-02-08 NOTE — PROGRESS NOTE ADULT - PROBLEM SELECTOR PROBLEM 4
HTN (hypertension)
Type 2 diabetes mellitus with chronic kidney disease, without long-term current use of insulin, unspecified CKD stage
HTN (hypertension)
HTN (hypertension)

## 2021-02-08 NOTE — H&P PST ADULT - GIT GEN HX ROS MEA POS PC
Physical Therapy-MRICU  Visit Type: treatment  Precautions:  Medical precautions:  fall risk; standard precautions.  Collaborated with BENTLEY Vaz    Monitor BP  Goes by Leandro   Pt has unresponsive episode while ambulating in osorio with OT staff on 2/3/21 and requires intubation. Extubated on 2/4/21  Lines:     Basic: IV, O2, telemetry, continuous pulse oximetry and complex lines    Complex: arterial line and CVH/SCVF      Lines in chart and on patient reviewed, cautions maintained throughout session.  Safety Measures: pt refuses chair alarm - RN aware.      SUBJECTIVE                                                                                                            Patient agreed to participate in therapy this date.  Patient / Family Goal: maximize function and return home      OBJECTIVE                                                                                                                BPs per artline 90s-100s/70sLevel of consciousness: lethargic    Oriented to person, place and time     Arousal alertness: appropriate responses to stimuli    Affect/Behavior: cooperative  Patient activity tolerance: 1 to 1 activity to rest    Bed Mobility:        Supine to sit: maximal assist  Training completed:      Max verbal/tactile cues to initiate task, assist to roll/reach to rail, support trunk to sitting EOB.    Transfers:    Assistive devices: 2-wheeled walker, 1 person and gait belt    Sit to stand: minimal assist    Stand to sit: minimal assist    Stand pivot: minimal assist  Training completed:      Cues for hand placement, assist to weightshift and balance in standing with ww.  Assist for balance, line management to pivot to chair.  Assist for eccentric control to return sitting.  Second person (RN) present for line management and monitoring vitals.          Interventions                                                                                                       Supine    Lower Extremity:  Bilateral: heel slides and hip IR/External Rotation in hooklying, AROM, 10 reps,   Additional exercise details: UE rowing AAROM performed unilaterally 10 reps.  Sitting trial at EOB 10 min to monitor BP and allow acclimation to upright positioning.   Skilled input: Verbal instruction/cues, tactile instruction/cues, posture correction and facilitation        ASSESSMENT                                                                                                                Impairments: range of motion, strength, activity tolerance, balance deficits, shortness of breath, safety awareness and cognition  Functional Limitations: all functional mobility  Therapeutic activities completed today include supine LE exercise, sitting trial EOB, sit<>stand/pivot transfer to address above deficits.  Pt is overall functioning at mod assist level for transfer with ww.  Time spent monitoring BP and overall tolerance to sitting/standing with RN present, pt reports dizziness but remains alert through session.   Patient will benefit from further skilled PT  to maximize safety and independence with functional mobility tasks.           Discharge Recommendations  Recommendation for Discharge: PT WI: Sub-acute nursing home             PT/OT Mobility Equipment for Discharge: will continue to monitor - owns 4ww   PT/OT ADL Equipment for Discharge: continue to assess  PT Identified Barriers to Discharge: medical       Skilled therapy is required to address these limitations in attempt to maximize the patient's independence.  Progress: progressing toward goals    End of Session:   Handoff to: nurse            PLAN                                                                                                                            Suggestions for next session as indicated: LE strengthening/balance exercises. Progress transfers.  Standing tolerance/ambulation progression.  Bed mobility training.           Frequency Comments: MWF 2/8      Interventions: balance, gait training, ROM, strengthening, neuromuscular re-education, bed mobility, HEP train/position, patient/family training, functional transfer training, equipment eval/education and endurance training  Agreement to plan and goals: patient agrees with goals and treatment plan        GOALS:  Review Date: 2/15/2021  Long Term Goals: (to be met by time of discharge from hospital)  Sit to supine: Patient will complete sit to supine modified independent.  Status: progressing/ongoing  Supine to sit: Patient will complete supine to sit modified independent.  Status: progressing/ongoing  Sit to stand: Patient will complete sit to stand transfer with 4-wheeled walker, modified independent.   Status: progressing/ongoing  Stand to sit: Patient will complete stand to sit transfer with 4-wheeled walker, modified independent.   Status: progressing/ongoing  Ambulation (even): Patient will ambulate on even surface for 100 feet with 4-wheeled walker, modified independent.   Status: progressing/ongoing    Documented in the chart in the following areas: Pain. Assessment.         constipation

## 2021-02-08 NOTE — PROGRESS NOTE ADULT - ASSESSMENT
80 year old Wolof speaking female with hx of HFpEF, CAD s/p CABG in Nov 2020, R breast cancer (1980s) s/p mastectomy with chronic RUE lymphedema, colon cancer s/p resection, rectal cancer (2015) s/p chemo and radiation, HTN, HLD, T2DM, DVT (2019) on eliquis, renal insufficiency, and obesity, presenting with complaint of RLE swelling and redness and hallucinations x2-3 days, admitted for RLE cellulitis, now clinically improving.

## 2021-02-08 NOTE — DISCHARGE NOTE PROVIDER - CARE PROVIDERS DIRECT ADDRESSES
[email or web address]

## 2021-02-08 NOTE — DISCHARGE NOTE PROVIDER - HOSPITAL COURSE
80 year old Danish speaking female with hx of HFpEF, CAD s/p CABG in Nov 2020, R breast cancer (1980s) s/p mastectomy with chronic RUE lymphedema, colon cancer s/p resection, rectal cancer (2015) s/p chemo and radiation, HTN, HLD, T2DM, DVT (2019) on eliquis, renal insufficiency, and obesity, presenting with complaint of RLE swelling and redness and hallucinations x2-3 days.  Doppler of legs b/l was negative for DVT and the patients 2.5 of eliquis for previous DVT was increased to 5mg bid. xray of the right knee which looked swollen on admission was normal. Pt was started on cefazolin for cellulitis in right leg and will continue taking cephalexin PO for a total 10 day course. Pt was incidentally found to have klebsiella/ESBL in UCx but pt denied symptoms so patient was not treated for UTI. She will f/u without patient PCP in 1-2 weeks for further management.     Pts course was c/b 20 beats of asymptomatic vtach. She was placed on tele and had occasional PVC's with HR in 70's     PT recommends rehab but patients daughter refusing recs and outpatient services as she would like to take her mother home.       Pt medically optimized for discharge 80 year old Croatian speaking female with hx of HFpEF, CAD s/p CABG in Nov 2020, R breast cancer (1980s) s/p mastectomy with chronic RUE lymphedema, colon cancer s/p resection, rectal cancer (2015) s/p chemo and radiation, HTN, HLD, T2DM, DVT (2019) on eliquis, renal insufficiency, and obesity, presenting with complaint of RLE swelling and redness and hallucinations x2-3 days.  Doppler of legs b/l was negative for DVT and the patients 2.5 of eliquis for previous DVT was increased to 5mg bid. xray of the right knee which looked swollen on admission was normal. Pt was started on cefazolin for cellulitis in right leg and will continue taking cephalexin PO for a total 10 day course. Pt was incidentally found to have klebsiella/ESBL in UCx but pt denied symptoms so patient was not treated for UTI. She will f/u without patient PCP in 1-2 weeks for further management.     Pts course was c/b 20 beats of asymptomatic vtach. She was placed on tele and had occasional PVC's with HR in 70's     PT recommends rehab but patients daughter refusing recs and outpatient services as she would like to take her mother home.      On day of discharge, she has remained hemodynamically stable, afebrile, with stable labs, and resolution of her symptoms. She is medically stable for discharge with close follow up from her outpatient primary care doctor. 80 year old Vietnamese speaking female with HFpEF, CAD s/p CABG in Nov 2020, R breast cancer (1980s) s/p mastectomy with chronic RUE lymphedema, colon cancer s/p resection, rectal cancer (2015) s/p chemo and radiation, HTN, HLD, T2DM, DVT (2019) on eliquis, CKD3 and obesity, presenting with complaint of RLE swelling and redness and hallucinations x2-3 days.  She was admitted to the hospital for RLE cellulitis c/b metabolic encephalopathy. Doppler of legs b/l was negative for DVT and the patients home dose of 2.5mg eliquis for previous DVT was increased to 5mg bid. X-ray of the right knee was negative for fracture/dislocation, patient was evaluated by orthopedics and ID and they were not suspicious for a septic joint. Pt was started on cefazolin for cellulitis in right leg with clinical improvement in swelling/erythema and mental status and will be discharged on cephalexin PO to complete a total 10 day course. of antibiotics Pt was incidentally found to have klebsiella/ESBL in UCx but pt denied symptoms so patient was not treated for UTI (felt patient had asymptomatic bacteruria). She will f/u with outpatient PCP in 1-2 weeks for further management/monitor for resolution/clinical stability.     Pts course was c/b 20 beats of asymptomatic vtach. She was placed on tele and had occasional PVC's with HR in 70's   Patient was treated for suspected HfpEF exacerbation (serum proBNP 1850, CXR with bilateral opacities which can be seen in pulmonary edema), and she clinically improved after diuresis.     PT recommends rehab but patients daughter refusing recs and outpatient services as she would like to take her mother home.      On day of discharge, she has remained hemodynamically stable, afebrile, with stable labs, and resolution of her symptoms. She is medically stable for discharge with close follow up from her outpatient primary care doctor.

## 2021-02-08 NOTE — DISCHARGE NOTE PROVIDER - CARE PROVIDER_API CALL
Luciano Dawkins)  Internal Medicine  [address] [place] [address]  Phone: [phone]  Fax: [phone]  Established Patient  Scheduled Appointment: 02/18/2021

## 2021-02-08 NOTE — PROGRESS NOTE ADULT - PROBLEM SELECTOR PLAN 8
-UA with bacteria, no nitrites or leuk est  -Patient denies urinary symptoms  -C/w abx for cellulitis as above, no need to specifically treat for UTI
-UA with bacteria, no nitrites or leuk est  -Patient denies urinary symptoms  -C/w abx for cellulitis as above, no need to specifically treat for UTI  - UCx going kleb/ESBL
-UA with bacteria, no nitrites or leuk est  -Patient denies urinary symptoms  -C/w abx for cellulitis as above, no need to specifically treat for UTI
-UA with bacteria, no nitrites or leuk est  -Patient denies urinary symptoms  -C/w abx for cellulitis as above, no need to specifically treat for UTI

## 2021-02-08 NOTE — PROGRESS NOTE ADULT - REASON FOR ADMISSION
RLE swelling and redness
leg swelling
RLE swelling and redness

## 2021-02-08 NOTE — DISCHARGE NOTE PROVIDER - NSDCMRMEDTOKEN_GEN_ALL_CORE_FT
acetaminophen 325 mg oral tablet: 2 tab(s) orally every 6 hours, As needed, Temp greater or equal to 38C (100.4F), Mild Pain (1 - 3), Moderate Pain (4 - 6)  amLODIPine 5 mg oral tablet: 1 tab(s) orally once a day  aspirin 81 mg oral delayed release tablet: 1 tab(s) orally once a day  Bumex 0.5 mg oral tablet: 1 tab(s) orally once a day, As Needed  cephalexin 500 mg oral capsule: 1 cap(s) orally every 6 hours   compression stockings : Leg swelling.   Eliquis 5 mg oral tablet: 1 tab(s) orally 2 times a day   famotidine 40 mg oral tablet: 1 tab(s) intravenous once a day (at bedtime)  glimepiride 2 mg oral tablet: 1 tab(s) orally once a day  Januvia 25 mg oral tablet: 1 tab(s) orally once a day  Lipitor 40 mg oral tablet: 1 tab(s) orally once a day (at bedtime)  polyethylene glycol 3350 oral powder for reconstitution: 17 gram(s) orally once a day  Toprol- mg oral tablet, extended release: 1.5 tab(s) orally once a day

## 2021-02-08 NOTE — DISCHARGE NOTE PROVIDER - NSDCCPCAREPLAN_GEN_ALL_CORE_FT
PRINCIPAL DISCHARGE DIAGNOSIS  Diagnosis: Cellulitis of right lower extremity  Assessment and Plan of Treatment: You came to the hospital with an infection in your right leg. We put you on antibiotics and you began to imporve. Please continue to take your antibiotics and follow up with your primary care doctor in 1-2 weeks      SECONDARY DISCHARGE DIAGNOSES  Diagnosis: Asymptomatic bacteriuria  Assessment and Plan of Treatment: You were found to have a bacteria called, Klebsiella/EBSL in your urine. Since you do not have symptoms of a UTI (burning with urination, frequency and urgency) treatment is not indicated at this time. Please follow up with your PCP in 1-2 weeks for further management    Diagnosis: DVT (deep venous thrombosis)  Assessment and Plan of Treatment: We did not find any clots in your legs during this hospital stay. However, we increased the dose of your Eliquis from 2.5 to 5mg twice a day in order to prevent future blood clots from forming. Please let your doctor know if you have any blood in stool or urine and notify a provider if you experience a fall.

## 2021-02-08 NOTE — DISCHARGE NOTE PROVIDER - NSDCFUSCHEDAPPT_GEN_ALL_CORE_FT
NURIA GARCIA ; 02/18/2021 ; [facility] [address]  NURIA GARCIA ; 03/01/2021 ; [facility] Orth[facility] [address]  NURIA GARCIA ; 03/04/2021 ; [facility] Card[facility] [address]  NURIA GARCIA ; 03/04/2021 ; [facility] [address] RADHANURIA ; [facility]16/2021 ; [facility]  NASREEN GARCIAMELA ; 02/18/2021 ; [facility] [address]  RADHA NURIA ; 03/01/2021 ; [facility] Orthos[facility] [address]  RADHA NURIA ; 03/01/2021 ; [facility] PreAdmits  RADHA NURIA ; 03/04/2021 ; [facility] Cardio [address]  RADHA NURIA ; 03/04/2021 ; [facility] [address]

## 2021-02-08 NOTE — PROGRESS NOTE ADULT - PROBLEM SELECTOR PLAN 1
Worsening right lower extremity redness and swelling over the past 1-2 days, aw/ tenderness, R>L. Redness and warmth of RLE on PE, no purulence  -Right leg xrays with normal arthroplasty and no evidence of erosion  -S/p clindamycin in the ED  -C/w cefazolin for now for RLE cellulitis, appreciate ID recs  -Hx penicillin allergy. Pt states she had facial swelling many years ago, cannot say how long ago, but also states she has had PCN since then without issue. Also received cephalosporin on prior admission without allergic reaction  -Monitor closely for allergic reaction while on cefazolin  -LE dopplers negative for DVT  - blood cultures NGTD so far   -appreciate ortho input, hx TKA, they are not suspicious for septic joint at this time, knee xray normal so no interventions needed at this time  - can continue with full course of 10 days of abx outpatient

## 2021-02-16 ENCOUNTER — OUTPATIENT (OUTPATIENT)
Dept: OUTPATIENT SERVICES | Facility: HOSPITAL | Age: 81
LOS: 1 days | End: 2021-02-16
Payer: MEDICARE

## 2021-02-16 VITALS
DIASTOLIC BLOOD PRESSURE: 73 MMHG | HEIGHT: 61 IN | WEIGHT: 188.05 LBS | TEMPERATURE: 98 F | OXYGEN SATURATION: 95 % | SYSTOLIC BLOOD PRESSURE: 135 MMHG | RESPIRATION RATE: 16 BRPM | HEART RATE: 73 BPM

## 2021-02-16 DIAGNOSIS — Z90.11 ACQUIRED ABSENCE OF RIGHT BREAST AND NIPPLE: Chronic | ICD-10-CM

## 2021-02-16 DIAGNOSIS — Z29.9 ENCOUNTER FOR PROPHYLACTIC MEASURES, UNSPECIFIED: ICD-10-CM

## 2021-02-16 DIAGNOSIS — Z96.651 PRESENCE OF RIGHT ARTIFICIAL KNEE JOINT: Chronic | ICD-10-CM

## 2021-02-16 DIAGNOSIS — Z01.818 ENCOUNTER FOR OTHER PREPROCEDURAL EXAMINATION: ICD-10-CM

## 2021-02-16 DIAGNOSIS — M16.11 UNILATERAL PRIMARY OSTEOARTHRITIS, RIGHT HIP: ICD-10-CM

## 2021-02-16 DIAGNOSIS — E11.9 TYPE 2 DIABETES MELLITUS WITHOUT COMPLICATIONS: ICD-10-CM

## 2021-02-16 DIAGNOSIS — Z98.89 OTHER SPECIFIED POSTPROCEDURAL STATES: Chronic | ICD-10-CM

## 2021-02-16 DIAGNOSIS — Z93.2 ILEOSTOMY STATUS: Chronic | ICD-10-CM

## 2021-02-16 DIAGNOSIS — Z90.49 ACQUIRED ABSENCE OF OTHER SPECIFIED PARTS OF DIGESTIVE TRACT: Chronic | ICD-10-CM

## 2021-02-16 DIAGNOSIS — I10 ESSENTIAL (PRIMARY) HYPERTENSION: ICD-10-CM

## 2021-02-16 LAB
A1C WITH ESTIMATED AVERAGE GLUCOSE RESULT: 7.5 % — HIGH (ref 4–5.6)
ANION GAP SERPL CALC-SCNC: 9 MMOL/L — SIGNIFICANT CHANGE UP (ref 5–17)
BLD GP AB SCN SERPL QL: NEGATIVE — SIGNIFICANT CHANGE UP
BUN SERPL-MCNC: 34 MG/DL — HIGH (ref 7–23)
CALCIUM SERPL-MCNC: 9.1 MG/DL — SIGNIFICANT CHANGE UP (ref 8.4–10.5)
CHLORIDE SERPL-SCNC: 103 MMOL/L — SIGNIFICANT CHANGE UP (ref 96–108)
CO2 SERPL-SCNC: 27 MMOL/L — SIGNIFICANT CHANGE UP (ref 22–31)
CREAT SERPL-MCNC: 1.44 MG/DL — HIGH (ref 0.5–1.3)
ESTIMATED AVERAGE GLUCOSE: 169 MG/DL — HIGH (ref 68–114)
GLUCOSE SERPL-MCNC: 244 MG/DL — HIGH (ref 70–99)
HCT VFR BLD CALC: 35.5 % — SIGNIFICANT CHANGE UP (ref 34.5–45)
HGB BLD-MCNC: 10.1 G/DL — LOW (ref 11.5–15.5)
MCHC RBC-ENTMCNC: 24.9 PG — LOW (ref 27–34)
MCHC RBC-ENTMCNC: 28.5 GM/DL — LOW (ref 32–36)
MCV RBC AUTO: 87.7 FL — SIGNIFICANT CHANGE UP (ref 80–100)
NRBC # BLD: 0 /100 WBCS — SIGNIFICANT CHANGE UP (ref 0–0)
PLATELET # BLD AUTO: 351 K/UL — SIGNIFICANT CHANGE UP (ref 150–400)
POTASSIUM SERPL-MCNC: 4.5 MMOL/L — SIGNIFICANT CHANGE UP (ref 3.5–5.3)
POTASSIUM SERPL-SCNC: 4.5 MMOL/L — SIGNIFICANT CHANGE UP (ref 3.5–5.3)
RBC # BLD: 4.05 M/UL — SIGNIFICANT CHANGE UP (ref 3.8–5.2)
RBC # FLD: 15.9 % — HIGH (ref 10.3–14.5)
RH IG SCN BLD-IMP: POSITIVE — SIGNIFICANT CHANGE UP
SODIUM SERPL-SCNC: 139 MMOL/L — SIGNIFICANT CHANGE UP (ref 135–145)
WBC # BLD: 7.66 K/UL — SIGNIFICANT CHANGE UP (ref 3.8–10.5)
WBC # FLD AUTO: 7.66 K/UL — SIGNIFICANT CHANGE UP (ref 3.8–10.5)

## 2021-02-16 PROCEDURE — 83036 HEMOGLOBIN GLYCOSYLATED A1C: CPT

## 2021-02-16 PROCEDURE — 86900 BLOOD TYPING SEROLOGIC ABO: CPT

## 2021-02-16 PROCEDURE — 87641 MR-STAPH DNA AMP PROBE: CPT

## 2021-02-16 PROCEDURE — 86901 BLOOD TYPING SEROLOGIC RH(D): CPT

## 2021-02-16 PROCEDURE — 87640 STAPH A DNA AMP PROBE: CPT

## 2021-02-16 PROCEDURE — 85027 COMPLETE CBC AUTOMATED: CPT

## 2021-02-16 PROCEDURE — G0463: CPT

## 2021-02-16 PROCEDURE — 86850 RBC ANTIBODY SCREEN: CPT

## 2021-02-16 PROCEDURE — 80048 BASIC METABOLIC PNL TOTAL CA: CPT

## 2021-02-16 NOTE — H&P PST ADULT - ASSESSMENT
CAPRINI SCORE    AGE RELATED RISK FACTORS                                                       MOBILITY RELATED FACTORS  [ ] Age 41-60 years                                            (1 Point)                  [ ] Bed rest                                                        (1 Point)  [ ] Age: 61-74 years                                           (2 Points)                [ ] Plaster cast                                                   (2 Points)  [ ] Age= 75 years                                              (3 Points)                 [ ] Bed bound for more than 72 hours                   (2 Points)    DISEASE RELATED RISK FACTORS                                               GENDER SPECIFIC FACTORS  [ ] Edema in the lower extremities                       (1 Point)                  [ ] Pregnancy                                                     (1 Point)  [ ] Varicose veins                                               (1 Point)                  [ ] Post-partum < 6 weeks                                   (1 Point)             [ ] BMI > 25 Kg/m2                                            (1 Point)                  [ ] Hormonal therapy  or oral contraception            (1 Point)                 [ ] Sepsis (in the previous month)                        (1 Point)                  [ ] History of pregnancy complications  [ ] Pneumonia or serious lung disease                                               [ ] Unexplained or recurrent                       (1 Point)           (in the previous month)                               (1 Point)  [ ] Abnormal pulmonary function test                     (1 Point)                 SURGERY RELATED RISK FACTORS  [ ] Acute myocardial infarction                              (1 Point)                 [ ]  Section                                            (1 Point)  [ ] Congestive heart failure (in the previous month)  (1 Point)                 [ ] Minor surgery                                                 (1 Point)   [ ] Inflammatory bowel disease                             (1 Point)                 [ ] Arthroscopic surgery                                        (2 Points)  [ ] Central venous access                                    (2 Points)                [ ] General surgery lasting more than 45 minutes   (2 Points)       [ ] Stroke (in the previous month)                          (5 Points)               [ ] Elective arthroplasty                                        (5 Points)                                                                                                                                               HEMATOLOGY RELATED FACTORS                                                 TRAUMA RELATED RISK FACTORS  [ ] Prior episodes of VTE                                     (3 Points)                 [ ] Fracture of the hip, pelvis, or leg                       (5 Points)  [ ] Positive family history for VTE                         (3 Points)                 [ ] Acute spinal cord injury (in the previous month)  (5 Points)  [ ] Prothrombin 84411 A                                      (3 Points)                 [ ] Paralysis  (less than 1 month)                          (5 Points)  [ ] Factor V Leiden                                             (3 Points)                 [ ] Multiple Trauma within 1 month                         (5 Points)  [ ] Lupus anticoagulants                                     (3 Points)                                                           [ ] Anticardiolipin antibodies                                (3 Points)                                                       [ ] High homocysteine in the blood                      (3 Points)                                             [ ] Other congenital or acquired thrombophilia       (3 Points)                                                [ ] Heparin induced thrombocytopenia                  (3 Points)                                          Total Score [          ] CAPRINI SCORE    AGE RELATED RISK FACTORS                                                       MOBILITY RELATED FACTORS  [ ] Age 41-60 years                                            (1 Point)                  [ ] Bed rest                                                        (1 Point)  [ ] Age: 61-74 years                                           (2 Points)                [ ] Plaster cast                                                   (2 Points)  [x ] Age= 75 years                                              (3 Points)                 [ ] Bed bound for more than 72 hours                   (2 Points)    DISEASE RELATED RISK FACTORS                                               GENDER SPECIFIC FACTORS  [ ] Edema in the lower extremities                       (1 Point)                  [ ] Pregnancy                                                     (1 Point)  [ ] Varicose veins                                               (1 Point)                  [ ] Post-partum < 6 weeks                                   (1 Point)             [x ] BMI > 25 Kg/m2                                            (1 Point)                  [ ] Hormonal therapy  or oral contraception            (1 Point)                 [ ] Sepsis (in the previous month)                        (1 Point)                  [ ] History of pregnancy complications  [ ] Pneumonia or serious lung disease                                               [ ] Unexplained or recurrent                       (1 Point)           (in the previous month)                               (1 Point)  [ ] Abnormal pulmonary function test                     (1 Point)                 SURGERY RELATED RISK FACTORS  [ ] Acute myocardial infarction                              (1 Point)                 [ ]  Section                                            (1 Point)  [ ] Congestive heart failure (in the previous month)  (1 Point)                 [ ] Minor surgery                                                 (1 Point)   [ ] Inflammatory bowel disease                             (1 Point)                 [ ] Arthroscopic surgery                                        (2 Points)  [ ] Central venous access                                    (2 Points)                [x ] General surgery lasting more than 45 minutes   (2 Points)       [ ] Stroke (in the previous month)                          (5 Points)               [ ] Elective arthroplasty                                        (5 Points)                                                                                                                                               HEMATOLOGY RELATED FACTORS                                                 TRAUMA RELATED RISK FACTORS  [x ] Prior episodes of VTE                                     (3 Points)                 [ ] Fracture of the hip, pelvis, or leg                       (5 Points)  [ ] Positive family history for VTE                         (3 Points)                 [ ] Acute spinal cord injury (in the previous month)  (5 Points)  [ ] Prothrombin 32450 A                                      (3 Points)                 [ ] Paralysis  (less than 1 month)                          (5 Points)  [ ] Factor V Leiden                                             (3 Points)                 [ ] Multiple Trauma within 1 month                         (5 Points)  [ ] Lupus anticoagulants                                     (3 Points)                                                           [ ] Anticardiolipin antibodies                                (3 Points)                                                       [ ] High homocysteine in the blood                      (3 Points)                                             [ ] Other congenital or acquired thrombophilia       (3 Points)                                                [ ] Heparin induced thrombocytopenia                  (3 Points)                                          Total Score [   8       ]

## 2021-02-16 NOTE — H&P PST ADULT - REASON FOR ADMISSION
" I'm having a hip replacement"   Goal- To minimze pain and take a few steps without apin " I'm having a hip replacement"   Goal- To minimize pain and take a few steps without pain

## 2021-02-16 NOTE — H&P PST ADULT - HISTORY OF PRESENT ILLNESS
80 year old Hungarian speaking female with HTN, HLD, T2DM, DVT (2019) on eliquis, HFpEF, CAD s/p CABG in Nov 2020, R breast cancer (1980s) s/p mastectomy with chronic RUE lymphedema, colon cancer s/p resection, rectal cancer (2015) s/p chemo and radiation,  renal insufficiency, and obesity,     presenting with complaint of RLE swelling and redness and hallucinations x2-3 days, admitted for RLE cellulitis, now clinically improving.     Accompanied by daughter, Shauna Jennings  This is an 80 year old Hungarian speaking female with past medical history of  HTN, HLD, T2DM, DVT (2019) on Eliquis, HFpEF, CAD s/p CABG in Nov 2020, R breast cancer (1980s) s/p mastectomy with chronic RUE lymphedema, colon cancer s/p resection, rectal cancer (2015) s/p chemo and radiation,  renal insufficiency, and obesity. Was recently admitted in [facility] 4- 8th, admitted for Right lower leg cellulitis was discharged home on Keflex regimen, which is now completed. Doppler of legs b/l was negative for DVT and the patients home dose of 2.5mg eliquis for previous DVT was increased to 5mg bid. However, daughter did not yet fill prescription and will clarify increased dose with PCP on scheduled appt 2/18 prior to starting. Pt is currently scheduled for Right total hip replacement on 3/1/21 with Dr. Guerrero  Accompanied by daughter, Shauna Jennings       **Covid test on 2/26 @ [facility]

## 2021-02-16 NOTE — H&P PST ADULT - LAST CARDIAC ANGIOGRAM/IMAGING
S/P CABG Nov 9th 2020 with Dr. Heath Nov 2020- Moderate atherosclerosis referred to [facility] S/P CABG Nov 9th 2020 with Dr. Heath

## 2021-02-16 NOTE — H&P PST ADULT - ACTIVITY
Pt is able to perform daily actvities without any cardiac distress however is somewhat limited due to Right hip pain Pt is able to perform daily activities without any cardiac distress however is somewhat limited due to Right hip pain

## 2021-02-16 NOTE — H&P PST ADULT - NSICDXPROBLEM_GEN_ALL_CORE_FT
PROBLEM DIAGNOSES  Problem: HTN (hypertension)  Assessment and Plan: Pt and daughter instructed to take BP meds with sip of water on DOS    Problem: DVT prophylaxis  Assessment and Plan: Pt is currently on Eliquis 2.5mg for h/o DVT. Instructed to hold 3 days prior to DOS or as otherwise instructed by PCP/ Cardiologist. Last dose on 2/25, PM dose.      Problem: DM (diabetes mellitus)  Assessment and Plan: Instructed to take last AM dose of Januvia and Glimperide on Feb 28th. No DM meds on DOS.     Problem: Need for prophylactic measure  Assessment and Plan: The Caprini score indicates that this patient is at high risk for a VTE event (score 6 or greater). Surgical patients in this group will benefit from both pharmacologic prophylaxis and intermittent compression devices.  The surgical team will determine the balance between VTE risk and bleeding risk, and other clinical considerations

## 2021-02-16 NOTE — H&P PST ADULT - LAST ECHOCARDIOGRAM
Nov 2020: Severely dilated left atrium, normal LV systolic Nov 2020: Severely dilated left atrium, normal LV systolic, borderline pulmonary HTN

## 2021-02-17 LAB
MRSA PCR RESULT.: SIGNIFICANT CHANGE UP
S AUREUS DNA NOSE QL NAA+PROBE: SIGNIFICANT CHANGE UP

## 2021-02-23 ENCOUNTER — APPOINTMENT (OUTPATIENT)
Dept: CARDIOLOGY | Facility: CLINIC | Age: 81
End: 2021-02-23
Payer: MEDICARE

## 2021-02-23 ENCOUNTER — NON-APPOINTMENT (OUTPATIENT)
Age: 81
End: 2021-02-23

## 2021-02-23 ENCOUNTER — APPOINTMENT (OUTPATIENT)
Dept: INTERNAL MEDICINE | Facility: CLINIC | Age: 81
End: 2021-02-23
Payer: MEDICARE

## 2021-02-23 VITALS
HEIGHT: 60 IN | BODY MASS INDEX: 37.11 KG/M2 | DIASTOLIC BLOOD PRESSURE: 83 MMHG | SYSTOLIC BLOOD PRESSURE: 129 MMHG | WEIGHT: 190 LBS | HEART RATE: 72 BPM | TEMPERATURE: 97.7 F | RESPIRATION RATE: 14 BRPM | OXYGEN SATURATION: 95 %

## 2021-02-23 DIAGNOSIS — D64.9 ANEMIA, UNSPECIFIED: ICD-10-CM

## 2021-02-23 DIAGNOSIS — J45.901 UNSPECIFIED ASTHMA WITH (ACUTE) EXACERBATION: ICD-10-CM

## 2021-02-23 PROCEDURE — XXXXX: CPT

## 2021-02-23 PROCEDURE — 99214 OFFICE O/P EST MOD 30 MIN: CPT

## 2021-02-23 NOTE — HISTORY OF PRESENT ILLNESS
[FreeTextEntry1] : Patient is an 81 year old woman with a history of HTN, hyperlipidemia, type 2 diabetes mellitus, breast cancer status post right partial mastectomy, rectal carcinoma status post resection, anemia, hypokalemia, diastolic heart failure, right lower extremity DVT, CAD s/p 2 vessel CABG 11/9/2020 and renal insufficiency who presents today for follow up of HTN and heart failure. She was recently hospitalized from February 4th-8th for right leg cellulitis and metabolic encephalopathy, and a heart failure exacerbation. While she was in the hospital she was given IV antibiotics and discharged on Keflex, as well as diuresis. When she came out of the hospital her weight was 178lbs. She has been taking Bumex 0.5mg every other day since the hospitalization. As per her daughter her weights have been trending up, she was 190lbs earlier today. She has also noticed her right lower leg has become more erythematous and swollen than when she came home from the hospital. She is scheduled for right hip surgery on March 1st. Currently she denies any chest pain, palpitations, dyspnea at rest, headaches, dizziness, or palpitations. Her right upper extremity swelling due to chronic lymphedema and dyspnea with exertion are unchanged.

## 2021-02-23 NOTE — DISCUSSION/SUMMARY
[FreeTextEntry1] : IMPRESSION: Ms. Batista is an 81 year old woman with a history of HTN, hyperlipidemia, type 2 diabetes mellitus, breast cancer status post right partial mastectomy, rectal carcinoma status post resection, diastolic heart failure, anemia, hypokalemia, CAD s/p 2-vessel CABG 11/9/2020 and renal insufficiency who presents today for follow up of HTN and heart failure.\par \par PLAN:\par 1. She is s/p CABG four months ago. She has been feeling well since the surgery.\par 2.She will continue on the Eliquis 2.5 mg twice daily for her DVT and she will continue to follow up with Vascular Cardiology. \par 3. For her lower extremity edema she will increase her Bumex to 0.5mg daily for one week. She will also continue to closely monitor her weights at home. She states that she has had multiple LE dopplers that were negative for DVT. \par 3. Her blood pressure is good today, thus she will continue to watch her diet and will also continue on Metoprolol  mg daily and Amlodipine 5mg daily.\par 4. Her most recent lipid profile was good, thus she will continue on Lipitor 40mg daily along with a low cholesterol diet. \par 5. She will follow up with me in 1 month or sooner if she is symptomatic. I have advised her to hold off on the surgery for now, as she was very recently in the hospital for acute heart failure, and in the past two weeks her weight has trended up by more than 10 lbs.

## 2021-02-23 NOTE — PHYSICAL EXAM
[General Appearance - Well Developed] : well developed [Normal Appearance] : normal appearance [General Appearance - Well Nourished] : well nourished [No Deformities] : no deformities [Normal Conjunctiva] : the conjunctiva exhibited no abnormalities [Normal Oral Mucosa] : normal oral mucosa [No Oral Pallor] : no oral pallor [No Oral Cyanosis] : no oral cyanosis [Normal Jugular Venous A Waves Present] : normal jugular venous A waves present [Normal Jugular Venous V Waves Present] : normal jugular venous V waves present [Respiration, Rhythm And Depth] : normal respiratory rhythm and effort [Exaggerated Use Of Accessory Muscles For Inspiration] : no accessory muscle use [Auscultation Breath Sounds / Voice Sounds] : lungs were clear to auscultation bilaterally [Abdomen Soft] : soft [Abdomen Tenderness] : non-tender [Nail Clubbing] : no clubbing of the fingernails [Cyanosis, Localized] : no localized cyanosis [Petechial Hemorrhages (___cm)] : no petechial hemorrhages [Skin Color & Pigmentation] : normal skin color and pigmentation [] : no rash [Skin Lesions] : no skin lesions [Oriented To Time, Place, And Person] : oriented to person, place, and time [Affect] : the affect was normal [Mood] : the mood was normal [No Anxiety] : not feeling anxious [Normal Rate] : normal [Rhythm Regular] : regular [Normal S1] : normal S1 [Normal S2] : normal S2 [II] : a grade 2 [___+] : [unfilled]U+ pretibial pitting edema on the right [Right Carotid Bruit] : no bruit heard over the right carotid [Left Carotid Bruit] : no bruit heard over the left carotid [Bruit] : no bruit heard [FreeTextEntry1] : Limited mobility d/t left hip and knee arthritis

## 2021-02-24 PROBLEM — J45.901 ASTHMATIC BRONCHITIS WITH ACUTE EXACERBATION, UNSPECIFIED ASTHMA SEVERITY, UNSPECIFIED WHETHER PERSISTENT: Status: ACTIVE | Noted: 2018-04-26

## 2021-02-24 NOTE — REVIEW OF SYSTEMS
[Recent Change In Weight] : ~T recent weight change [Leg Claudication] : leg claudication [Lower Ext Edema] : lower extremity edema [Incontinence] : incontinence [Nocturia] : nocturia [Joint Pain] : joint pain [Joint Stiffness] : joint stiffness [Skin Rash] : skin rash [Unsteady Walking] : ataxia [Anxiety] : anxiety [Negative] : Heme/Lymph [Fever] : no fever [Chills] : no chills [Fatigue] : no fatigue [Hot Flashes] : no hot flashes [Night Sweats] : no night sweats [Chest Pain] : no chest pain [Palpitations] : no palpitations [Orthopnea] : no orthopnea [Paroxysmal Nocturnal Dyspnea] : no paroxysmal nocturnal dyspnea [Dysuria] : no dysuria [Hematuria] : no hematuria [Frequency] : no frequency [Vaginal Discharge] : no vaginal discharge [Joint Swelling] : no joint swelling [Muscle Weakness] : no muscle weakness [Muscle Pain] : no muscle pain [Back Pain] : no back pain [Itching] : no itching [Headache] : no headache [Dizziness] : no dizziness [Fainting] : no fainting [Confusion] : no confusion [Memory Loss] : no memory loss [Suicidal] : not suicidal [Insomnia] : no insomnia [Depression] : no depression [FreeTextEntry2] : SEE HPI [FreeTextEntry9] : RIGHT HIP [de-identified] : Chronic right lower extremity changes [de-identified] : Wheelchair-bound

## 2021-02-24 NOTE — HISTORY OF PRESENT ILLNESS
[Coronary Artery Disease] : coronary artery disease [Asthma] : asthma [No Adverse Anesthesia Reaction] : no adverse anesthesia reaction in self or family member [Chronic Anticoagulation] : chronic anticoagulation [Chronic Kidney Disease] : chronic kidney disease [Diabetes] : diabetes [(Patient denies any chest pain, claudication, dyspnea on exertion, orthopnea, palpitations or syncope)] : Patient denies any chest pain, claudication, dyspnea on exertion, orthopnea, palpitations or syncope [Family Member] : family member [Recent Myocardial Infarction] : no recent myocardial infarction [COPD] : no COPD [Sleep Apnea] : no sleep apnea [Smoker] : not a smoker [FreeTextEntry1] : TOTAL RT HIP REPLACEMENT [FreeTextEntry2] : 03/01/21 [FreeTextEntry3] : DR MARQUEZ BYERS [FreeTextEntry5] : CHF, DVT right lower extremity, on anticoagulation therapy

## 2021-02-24 NOTE — PLAN
[FreeTextEntry1] : 81-year-old female with known type 2 diabetes with renal complications, chronic kidney disease, coronary disease with congestive heart failure, history of chronic right lower extremity DVT, on long-term anticoagulation therapy with Eliquis, chronic anemia, chronic stasis dermatitis, recurrent cellulitis of the right lower extremity, and  severe osteoarthritis of the right hip, is medically optimized for total right hip replacement.  The patient is a high risk candidate for the procedure, and requires further optimization by her cardiologist, Dr. Hagan, who will provide final clearance.\par The patient and her daughter, are aware of the risk benefits, alternatives, of proposed surgery, the patient wishes to proceed with surgery.\par I recommended further medical optimization and cardiac optimization, by admitting the patient to the hospital, a few days before the procedure, however the patient is reluctant to be hospitalized, therefore she wishes to continue outpatient follow-up with the cardiologist.

## 2021-02-24 NOTE — RESULTS/DATA
[ECG Reviewed] : reviewed [No Acute Ischemia] : No acute ischemia [NSR] : normal sinus rhythm [Poor R Wave Progression] : poor R-wave progresion [No Interval Change] : no interval change [] : results reviewed [de-identified] : H&H 10.1/35 [de-identified] : Creatinine 1.44, glucose 244, hemoglobin A1c 7.1 [de-identified] : Chest x-ray increased vascular markings, consistent with mild congestion [de-identified] : Results reviewed from patient's recent hospitalization in February

## 2021-02-24 NOTE — PHYSICAL EXAM
[No Acute Distress] : no acute distress [Well Nourished] : well nourished [Normal Sclera/Conjunctiva] : normal sclera/conjunctiva [No JVD] : no jugular venous distention [No Lymphadenopathy] : no lymphadenopathy [Supple] : supple [No Respiratory Distress] : no respiratory distress  [No Accessory Muscle Use] : no accessory muscle use [Clear to Auscultation] : lungs were clear to auscultation bilaterally [Normal Rate] : normal rate  [Regular Rhythm] : with a regular rhythm [Normal S1, S2] : normal S1 and S2 [No Murmur] : no murmur heard [Soft] : abdomen soft [Non Tender] : non-tender [Non-distended] : non-distended [Normal Bowel Sounds] : normal bowel sounds [No CVA Tenderness] : no CVA  tenderness [No Spinal Tenderness] : no spinal tenderness [No Focal Deficits] : no focal deficits [Normal Affect] : the affect was normal [Alert and Oriented x3] : oriented to person, place, and time [Normal Insight/Judgement] : insight and judgment were intact [de-identified] : Non pitting edema of the right lower extremity above the foot, chronic stasis changes [de-identified] : no range of motion of the right hip, with extreme tenderness [de-identified] : Wheelchair-bound.  Needs assist device.

## 2021-02-24 NOTE — ASSESSMENT
[Cardiology consultation] : Cardiology consultation [Modify medications prior to procedure] : Modify medications prior to procedure [As per surgery] : as per surgery [Patient NOT optimized for Surgery at this time] : Patient not optimized for surgery at this time [FreeTextEntry7] : Eliquis can be stopped 3 days before surgery.

## 2021-03-01 ENCOUNTER — APPOINTMENT (OUTPATIENT)
Dept: ORTHOPEDIC SURGERY | Facility: HOSPITAL | Age: 81
End: 2021-03-01

## 2021-03-04 ENCOUNTER — APPOINTMENT (OUTPATIENT)
Dept: CARDIOLOGY | Facility: CLINIC | Age: 81
End: 2021-03-04

## 2021-03-04 ENCOUNTER — APPOINTMENT (OUTPATIENT)
Dept: INTERNAL MEDICINE | Facility: CLINIC | Age: 81
End: 2021-03-04

## 2021-03-10 ENCOUNTER — INPATIENT (INPATIENT)
Facility: HOSPITAL | Age: 81
LOS: 7 days | Discharge: HOME | DRG: 291 | End: 2021-03-18
Attending: HOSPITALIST | Admitting: HOSPITALIST
Payer: MEDICARE

## 2021-03-10 VITALS
SYSTOLIC BLOOD PRESSURE: 115 MMHG | TEMPERATURE: 98 F | HEIGHT: 61 IN | WEIGHT: 220.02 LBS | DIASTOLIC BLOOD PRESSURE: 74 MMHG | HEART RATE: 73 BPM | RESPIRATION RATE: 24 BRPM | OXYGEN SATURATION: 100 %

## 2021-03-10 DIAGNOSIS — Z93.2 ILEOSTOMY STATUS: Chronic | ICD-10-CM

## 2021-03-10 DIAGNOSIS — I50.9 HEART FAILURE, UNSPECIFIED: ICD-10-CM

## 2021-03-10 DIAGNOSIS — Z90.11 ACQUIRED ABSENCE OF RIGHT BREAST AND NIPPLE: Chronic | ICD-10-CM

## 2021-03-10 DIAGNOSIS — Z96.651 PRESENCE OF RIGHT ARTIFICIAL KNEE JOINT: Chronic | ICD-10-CM

## 2021-03-10 DIAGNOSIS — Z98.89 OTHER SPECIFIED POSTPROCEDURAL STATES: Chronic | ICD-10-CM

## 2021-03-10 DIAGNOSIS — Z90.49 ACQUIRED ABSENCE OF OTHER SPECIFIED PARTS OF DIGESTIVE TRACT: Chronic | ICD-10-CM

## 2021-03-10 LAB
ALBUMIN SERPL ELPH-MCNC: 3.8 G/DL — SIGNIFICANT CHANGE UP (ref 3.3–5)
ALP SERPL-CCNC: 121 U/L — HIGH (ref 40–120)
ALT FLD-CCNC: 14 U/L — SIGNIFICANT CHANGE UP (ref 10–45)
ANION GAP SERPL CALC-SCNC: 10 MMOL/L — SIGNIFICANT CHANGE UP (ref 5–17)
APPEARANCE UR: CLEAR — SIGNIFICANT CHANGE UP
AST SERPL-CCNC: 17 U/L — SIGNIFICANT CHANGE UP (ref 10–40)
BACTERIA # UR AUTO: NEGATIVE — SIGNIFICANT CHANGE UP
BASOPHILS # BLD AUTO: 0.02 K/UL — SIGNIFICANT CHANGE UP (ref 0–0.2)
BASOPHILS NFR BLD AUTO: 0.2 % — SIGNIFICANT CHANGE UP (ref 0–2)
BILIRUB SERPL-MCNC: 0.3 MG/DL — SIGNIFICANT CHANGE UP (ref 0.2–1.2)
BILIRUB UR-MCNC: NEGATIVE — SIGNIFICANT CHANGE UP
BUN SERPL-MCNC: 29 MG/DL — HIGH (ref 7–23)
CALCIUM SERPL-MCNC: 9.8 MG/DL — SIGNIFICANT CHANGE UP (ref 8.4–10.5)
CHLORIDE SERPL-SCNC: 101 MMOL/L — SIGNIFICANT CHANGE UP (ref 96–108)
CO2 SERPL-SCNC: 29 MMOL/L — SIGNIFICANT CHANGE UP (ref 22–31)
COLOR SPEC: SIGNIFICANT CHANGE UP
CREAT SERPL-MCNC: 1.48 MG/DL — HIGH (ref 0.5–1.3)
DIFF PNL FLD: ABNORMAL
EOSINOPHIL # BLD AUTO: 0.03 K/UL — SIGNIFICANT CHANGE UP (ref 0–0.5)
EOSINOPHIL NFR BLD AUTO: 0.4 % — SIGNIFICANT CHANGE UP (ref 0–6)
EPI CELLS # UR: 1 /HPF — SIGNIFICANT CHANGE UP
GAS PNL BLDV: SIGNIFICANT CHANGE UP
GLUCOSE SERPL-MCNC: 158 MG/DL — HIGH (ref 70–99)
GLUCOSE UR QL: NEGATIVE — SIGNIFICANT CHANGE UP
HCT VFR BLD CALC: 33.2 % — LOW (ref 34.5–45)
HGB BLD-MCNC: 9.5 G/DL — LOW (ref 11.5–15.5)
HYALINE CASTS # UR AUTO: 4 /LPF — HIGH (ref 0–2)
IMM GRANULOCYTES NFR BLD AUTO: 0.4 % — SIGNIFICANT CHANGE UP (ref 0–1.5)
INR BLD: 1.33 RATIO — HIGH (ref 0.88–1.16)
KETONES UR-MCNC: NEGATIVE — SIGNIFICANT CHANGE UP
LEUKOCYTE ESTERASE UR-ACNC: ABNORMAL
LYMPHOCYTES # BLD AUTO: 0.98 K/UL — LOW (ref 1–3.3)
LYMPHOCYTES # BLD AUTO: 12.2 % — LOW (ref 13–44)
MCHC RBC-ENTMCNC: 24.7 PG — LOW (ref 27–34)
MCHC RBC-ENTMCNC: 28.6 GM/DL — LOW (ref 32–36)
MCV RBC AUTO: 86.5 FL — SIGNIFICANT CHANGE UP (ref 80–100)
MONOCYTES # BLD AUTO: 0.78 K/UL — SIGNIFICANT CHANGE UP (ref 0–0.9)
MONOCYTES NFR BLD AUTO: 9.7 % — SIGNIFICANT CHANGE UP (ref 2–14)
NEUTROPHILS # BLD AUTO: 6.18 K/UL — SIGNIFICANT CHANGE UP (ref 1.8–7.4)
NEUTROPHILS NFR BLD AUTO: 77.1 % — HIGH (ref 43–77)
NITRITE UR-MCNC: NEGATIVE — SIGNIFICANT CHANGE UP
NRBC # BLD: 0 /100 WBCS — SIGNIFICANT CHANGE UP (ref 0–0)
NT-PROBNP SERPL-SCNC: 2810 PG/ML — HIGH (ref 0–300)
PH UR: 6 — SIGNIFICANT CHANGE UP (ref 5–8)
PLATELET # BLD AUTO: 302 K/UL — SIGNIFICANT CHANGE UP (ref 150–400)
POTASSIUM SERPL-MCNC: 3.7 MMOL/L — SIGNIFICANT CHANGE UP (ref 3.5–5.3)
POTASSIUM SERPL-SCNC: 3.7 MMOL/L — SIGNIFICANT CHANGE UP (ref 3.5–5.3)
PROT SERPL-MCNC: 7.6 G/DL — SIGNIFICANT CHANGE UP (ref 6–8.3)
PROT UR-MCNC: ABNORMAL
PROTHROM AB SERPL-ACNC: 15.7 SEC — HIGH (ref 10.6–13.6)
RBC # BLD: 3.84 M/UL — SIGNIFICANT CHANGE UP (ref 3.8–5.2)
RBC # FLD: 17.9 % — HIGH (ref 10.3–14.5)
RBC CASTS # UR COMP ASSIST: 0 /HPF — SIGNIFICANT CHANGE UP (ref 0–4)
SARS-COV-2 RNA SPEC QL NAA+PROBE: SIGNIFICANT CHANGE UP
SODIUM SERPL-SCNC: 140 MMOL/L — SIGNIFICANT CHANGE UP (ref 135–145)
SP GR SPEC: 1.01 — SIGNIFICANT CHANGE UP (ref 1.01–1.02)
TROPONIN T, HIGH SENSITIVITY RESULT: 26 NG/L — SIGNIFICANT CHANGE UP (ref 0–51)
TROPONIN T, HIGH SENSITIVITY RESULT: 27 NG/L — SIGNIFICANT CHANGE UP (ref 0–51)
UROBILINOGEN FLD QL: NEGATIVE — SIGNIFICANT CHANGE UP
WBC # BLD: 8.02 K/UL — SIGNIFICANT CHANGE UP (ref 3.8–10.5)
WBC # FLD AUTO: 8.02 K/UL — SIGNIFICANT CHANGE UP (ref 3.8–10.5)
WBC UR QL: 0 /HPF — SIGNIFICANT CHANGE UP (ref 0–5)

## 2021-03-10 PROCEDURE — 99223 1ST HOSP IP/OBS HIGH 75: CPT | Mod: GC

## 2021-03-10 PROCEDURE — 71045 X-RAY EXAM CHEST 1 VIEW: CPT | Mod: 26

## 2021-03-10 PROCEDURE — 99285 EMERGENCY DEPT VISIT HI MDM: CPT | Mod: CS

## 2021-03-10 PROCEDURE — 93010 ELECTROCARDIOGRAM REPORT: CPT

## 2021-03-10 RX ORDER — GABAPENTIN 400 MG/1
1 CAPSULE ORAL
Qty: 0 | Refills: 0 | DISCHARGE

## 2021-03-10 RX ORDER — FUROSEMIDE 40 MG
40 TABLET ORAL ONCE
Refills: 0 | Status: COMPLETED | OUTPATIENT
Start: 2021-03-10 | End: 2021-03-10

## 2021-03-10 RX ADMIN — Medication 40 MILLIGRAM(S): at 19:45

## 2021-03-10 NOTE — H&P ADULT - ASSESSMENT
81 y/o F hx/ HTN, HLD, DM2 not on insulin, CAD s/p 2-vessel CABG 11/9/2020, Stage II Diastolic CHF (EF 60% 11/20/20), Breast cancer s/p right partial mastectomy and chemo (1989), Rectal Ca s/p resection and radiation 2015, R calf DVT on eliquis, anemia, CKDIII (Cr: 1.59 baseline) recent [facility] admission 2/4-2/8 for CHF exacerbation p/w increasing weight/swelling, sob, and difficulty ambulating x2 weeks likely 2/2 CHF exacerbation with no known inciting event.  81 y/o F hx/ HTN, HLD, DM2 not on insulin, CAD s/p 2-vessel CABG 11/9/2020, Stage II Diastolic CHF (EF 60% 11/20/20), Breast cancer s/p right partial mastectomy and chemo (1989), Rectal Ca s/p resection and radiation 2015, R calf DVT on eliquis, anemia, CKDIII (Cr: 1.59 baseline) recent [facility] admission 2/4-2/8 for CHF exacerbation p/w increasing weight/swelling, sob, and difficulty ambulating x2 weeks likely 2/2 CHF exacerbation with no known inciting event.

## 2021-03-10 NOTE — H&P ADULT - PROBLEM SELECTOR PROBLEM 4
Type II diabetes mellitus Kidney disease, chronic, stage III (GFR 30-59 ml/min) CAD, multiple vessel

## 2021-03-10 NOTE — ED ADULT TRIAGE NOTE - CHIEF COMPLAINT QUOTE
sob, b/l leg edema, wheezes. pt was 91% on RA upon EMS arrival, 100% on 2L with duoneb given en route.  lymphedema to r. arm  no fever, cough, sick contacts

## 2021-03-10 NOTE — ED PROVIDER NOTE - OBJECTIVE STATEMENT
80 yo female with a pmh breast ca with rue lymphedema, CHF on budesonide, htn, hld, DVT on eliquis, presenting with concern of worsening sob, orthopnea and leg swelling x 2 days. Pt  recently seen by cardiologist Dr. Hagan 2 weeks ago who believed she was retaining fluid, started her on budesonide 0.5mg bid, however over the past 3 days increased it to 1mg BID and one time dose of metalazone as pt gained weight. Last night pt noticed worsening sob, denies chest pain, abdominal pain, nausea, vomiting, wheezing, worsening leg pain, no fevers, chills, cough. 82 yo female with a pmh breast ca with rue lymphedema, CHF on Bumetanide. htn, hld, DVT on eliquis, presenting with concern of worsening sob, orthopnea and leg swelling x 2 days. Pt  recently seen by cardiologist Dr. Hagan 2 weeks ago who believed she was retaining fluid, started her on budesonide 0.5mg bid, however over the past 3 days increased it to 1mg BID and one time dose of metalazone as pt gained weight. Last night pt noticed worsening sob, denies chest pain, abdominal pain, nausea, vomiting, wheezing, worsening leg pain, no fevers, chills, cough. 80 yo female with a pmh breast ca with rue lymphedema, CHF on Bumetanide. htn, hld, DVT on eliquis, presenting with concern of worsening sob, orthopnea and leg swelling x 2 days. Pt  recently seen by cardiologist Dr. Hagan 2 weeks ago who believed she was retaining fluid, started her on Bumetanide 0.5mg bid, however over the past 3 days increased it to 1mg BID and one time dose of metalazone as pt gained weight. Last night pt noticed worsening sob, denies chest pain, abdominal pain, nausea, vomiting, wheezing, worsening leg pain, no fevers, chills, cough.

## 2021-03-10 NOTE — H&P ADULT - PROBLEM SELECTOR PLAN 2
s/p 2-vessel CABG 11/9/2020  - c/w Simvastatin 40mg Previous hx of Right DVT 2019 on eliquis though patient was supposed to increase to 5mg bid only on 2.5mg bid  - At this time L>R in diameter, more tender, and erythematous  - Duplex US B/L given possible DVT; Pt not tachycardic nor any longer tachypneic on 3LNC At this time LLE>R in diameter, more tender, and erythematous, shiny with good pulses   - Less likely cellulitis 2/2 chronic edema vs. DVT vs. CHF Exacerbation   - Duplex US B/L given possible DVT; Pt not tachycardic nor any longer tachypneic on 3LNC after lasix   - CHF exacerbation as above  - Hold off on abx: low suspicion for cellulitis At this time LLE>R in diameter, more tender, and erythematous, shiny with good pulses   - Less likely cellulitis 2/2 chronic edema vs. DVT vs. CHF Exacerbation   - Duplex US B/L given possible DVT; Pt not tachycardic nor any longer tachypneic on 3LNC after lasix  - CHF exacerbation management as above  - Hold off on abx: low suspicion for cellulitis

## 2021-03-10 NOTE — ED ADULT NURSE NOTE - OBJECTIVE STATEMENT
pt a/ox3 BIBA after feeling SOB at rest since last night, worsening with exertion. pt has a hx of CHF and at baseline has bilateral LE edema which she takes a water pill for. noticed that in the past few days the edema has been worsening. was on 91% RA upon EMS arrival. given 2 duoneb tx enroute and is 100% on 2LNC. pt has RUE lymphedema and limb alert. b/l LE are red and swollen. pt denies any CP/cough/fevers. rr even, pt tachypneic. wheezing and diminished lung sounds heard upon auscultation. does not smoke. pt placed on cardiac monitor. PA at bedside. safety maintained

## 2021-03-10 NOTE — H&P ADULT - ATTENDING COMMENTS
Pt seen and examined. P/w SOB, b/l LE edema and weight gain c/f ADHF. Hypoxic requiring 2LNC currently, saturating well. LLE more swollen than RLE with erythema - question cellulitis, but pt without systemic symptoms, no leukocytosis or fever. Will c/w lasix 40 IV BID for now, monitor response; if LLE not improving, would consider starting abx; f/u duplex to r/o DVT. Rest of plan as above.

## 2021-03-10 NOTE — H&P ADULT - PROBLEM SELECTOR PLAN 1
Stage II Diastolic CHF (EF 60% 11/20/20)  CXR showing pulmonary edema b/l   BNP: 2810 Known Stage II Diastolic CHF (EF 60% 11/20/20) Clinically Overloaded  - CXR showing pulmonary edema b/l; BNP: 2810  - s/p 40mg IV lasix, 2 weeks 0.5 bumex, 3 days 1mg bumex, and 1 dose metolazone. C/w 40mg IV lasix BID, hold metoprolol   - EKG normal: grossly normal, low voltage   - No clear inciting event; no s/sx of infection, not in DKA, low suspicion of MI   - Daily weights, fluid 1.5L restriction, salt restriction Known Stage II Diastolic CHF (EF 60% 11/20/20) Clinically Overloaded  - CXR showing pulmonary edema b/l; BNP: 2810  - s/p 40mg IV lasix, 2 weeks 0.5 bumex, 3 days 1mg bumex, and 1 dose metolazone. C/w 40mg IV lasix BID, hold metoprolol   - EKG normal: grossly normal, low voltage   - No clear inciting event; no VSS, no overt signs/sx of infxn not in DKA, low suspicion of MI; perhaps DVT/PE vs. low suspicion of LLE cellulitis?   - Daily weights, fluid 1.5L restriction, salt restriction Known Stage II Diastolic CHF (EF 60% 11/20/20) HJR- but Clinically Overloaded  - CXR showing pulmonary edema b/l; BNP: 2810  - s/p 40mg IV lasix, 2 weeks 0.5 bumex, 3 days 1mg bumex, and 1 dose metolazone. C/w 40mg IV lasix BID, hold metoprolol   - EKG normal: grossly normal, low voltage   - No clear inciting event; no VSS, no overt signs/sx of infxn not in DKA, low suspicion of MI; perhaps DVT/PE vs. low suspicion of LLE cellulitis?   - Daily weights, fluid 1.5L restriction, salt restriction Known Stage II Diastolic CHF (EF 60% 11/20/20) HJR- but Clinically Overloaded  - CXR showing pulmonary edema b/l; BNP: 2810; Qtc 486  - s/p 40mg IV lasix, 2 weeks 0.5 bumex, 3 days 1mg bumex, and 1 dose metolazone. C/w 40mg IV lasix BID, c/w metoprolol 150mg qd  - EKG normal: grossly normal, low voltage   - No clear inciting event; no VSS, no overt signs/sx of infxn not in DKA, low suspicion of MI; perhaps DVT/PE vs. low suspicion of LLE cellulitis?   - Daily weights, fluid 1.5L restriction, salt restriction Known Stage II Diastolic CHF (EF 60% 11/20/20) HJR- but Clinically Overloaded  - CXR showing pulmonary edema b/l; BNP: 2810; Qtc 486  - s/p 40mg IV lasix, 2 weeks 0.5 bumex, 3 days 1mg bumex, and 1 dose metolazone. C/w 40mg IV lasix BID, c/w metoprolol 150mg qd  - EKG normal: grossly normal, low voltage   - No clear inciting event; no VSS, no overt signs/sx of infxn not in DKA, low suspicion of MI; perhaps DVT/PE vs. low suspicion of LLE cellulitis?   - Daily weights, fluid 1.5L restriction, salt restriction  - Notify Cardiologist: Dr. Ld Hagan Known Stage II Diastolic CHF (EF 60% 11/20/20) HJR- but Clinically Overloaded  - CXR showing pulmonary edema b/l; BNP: 2810; Qtc 486  - s/p 40mg IV lasix, 2 weeks 0.5 bumex, 3 days 1mg bumex, and 1 dose metolazone. C/w 40mg IV lasix BID, c/w metoprolol 150mg qd  - EKG normal: grossly normal, low voltage   - No clear inciting event; VSS, no overt signs/sx of infxn not in DKA, low suspicion of MI; perhaps DVT/PE vs. low suspicion of LLE cellulitis?   - Daily weights, fluid 1.5L restriction, salt restriction  - Notify Cardiologist: Dr. Ld Hagan

## 2021-03-10 NOTE — H&P ADULT - PROBLEM SELECTOR PLAN 4
2/16/21: A1c 7.5 Januvia and Glimepiride at home   - ISS, FS qACHS, CC Diet CKDIII GFR:30; baseline Cr: 1.4   - Here 1.48 near baseline   - Monitor BMP QD, avoid nephrotoxic drugs s/p 2-vessel CABG 11/9/2020  - c/w Simvastatin 40mg s/p 2-vessel CABG 11/9/2020  - c/w Simvastatin 40mg  - c/w metoprolol 150mg qd

## 2021-03-10 NOTE — H&P ADULT - PROBLEM SELECTOR PLAN 3
CKDIII GFR:30; baseline Cr: 1.4   - Here 1.48 near baseline   - Monitor BMP QD, avoid nephrotoxic drugs s/p 2-vessel CABG 11/9/2020  - c/w Simvastatin 40mg Previous hx of Right DVT 2019 on eliquis though patient was supposed to increase to 5mg bid only on 2.5mg bid  - At this time LLE>R in diameter, more tender, and erythematous  - Duplex US B/L given possible DVT; Pt not tachycardic nor any longer tachypneic on 3LNC; low utility of D-Dimer given CKD

## 2021-03-10 NOTE — H&P ADULT - PROBLEM SELECTOR PROBLEM 3
Kidney disease, chronic, stage III (GFR 30-59 ml/min) CAD, multiple vessel DVT of leg (deep venous thrombosis)

## 2021-03-10 NOTE — ED ADULT NURSE REASSESSMENT NOTE - NS ED NURSE REASSESS COMMENT FT1
pt remains a/ox3 on cardiac monitor. pt placed on bed pan and urine collected and sent. pt cleaned, sheets changed. report give n to verena Ferrara for further care

## 2021-03-10 NOTE — ED PROVIDER NOTE - CARE PLAN
Principal Discharge DX:	Acute heart failure, unspecified heart failure type  Secondary Diagnosis:	Hypoxia

## 2021-03-10 NOTE — H&P ADULT - NSHPPHYSICALEXAM_GEN_ALL_CORE
Objective:    Vitals: Vital Signs Last 24 Hrs  T(C): 36.9 (03-10-21 @ 23:17), Max: 36.9 (03-10-21 @ 23:17)  T(F): 98.4 (03-10-21 @ 23:17), Max: 98.4 (03-10-21 @ 23:17)  HR: 67 (03-10-21 @ 23:17) (66 - 76)  BP: 144/68 (03-10-21 @ 23:17) (111/81 - 174/64)  BP(mean): --  RR: 20 (03-10-21 @ 23:17) (20 - 24)  SpO2: 100% (03-10-21 @ 23:17) (96% - 100%)            I&O's Summary      PHYSICAL EXAM:  GENERAL: NAD, well-groomed, well-developed  HEAD:  Atraumatic, Normocephalic  EYES: EOMI, PERRLA, conjunctiva and sclera clear  ENMT: No tonsillar erythema, exudates, or enlargement; Moist mucous membranes, Good dentition, No lesions  NECK: Supple, No JVD, Normal thyroid  CHEST/LUNG: Clear to auscultation bilaterally; No rales, rhonchi, wheezing, or rubs  HEART: Regular rate and rhythm; No murmurs, rubs, or gallops  ABDOMEN: Soft, Nontender, Nondistended; Bowel sounds present  EXTREMITIES:  2+ Peripheral Pulses, No clubbing, cyanosis, or edema  LYMPH: No lymphadenopathy noted  SKIN: No rashes or lesions  NERVOUS SYSTEM:  Alert & Oriented X4, Good concentration  PSYCH: Normal Affect. Speaking in Full Sentences. Laying in bed comfortably; not agitated Objective:    Vitals: Vital Signs Last 24 Hrs  T(C): 36.9 (03-10-21 @ 23:17), Max: 36.9 (03-10-21 @ 23:17)  T(F): 98.4 (03-10-21 @ 23:17), Max: 98.4 (03-10-21 @ 23:17)  HR: 67 (03-10-21 @ 23:17) (66 - 76)  BP: 144/68 (03-10-21 @ 23:17) (111/81 - 174/64)  BP(mean): --  RR: 20 (03-10-21 @ 23:17) (20 - 24)  SpO2: 100% (03-10-21 @ 23:17) (96% - 100%)            I&O's Summary      PHYSICAL EXAM:  GENERAL: NAD, well-groomed, well-developed  HEAD:  Atraumatic, Normocephalic  EYES: EOMI, PERRLA, conjunctiva and sclera clear  NECK: Supple, No JVD, Normal thyroid, no HJR   CHEST/LUNG: Clear to auscultation bilaterally though difficult to appreciate fully given body habitus; No rales, rhonchi, wheezing, or rubs. Multiple scars on chest given hx of mastectomy   HEART: Regular rate and rhythm; No murmurs, rubs, or gallops though difficult to fully appreciate sounds given habitus   ABDOMEN: Soft, Nontender, Nondistended; Bowel sounds present. Noted subumbilical scar from previous colon resection and ileostomy reversal  EXTREMITIES:  2+ Peripheral Pulses up to PT. B/L Edema L>R up to knee. TTP. Shiny and mottled. Erythematous L>R. Right scar noted near tibia from previous known surgery.     NERVOUS SYSTEM:  Alert & Oriented X4, Good concentration  PSYCH: Normal Affect. Speaking in Full Sentences; not agitated Objective:    Vitals: Vital Signs Last 24 Hrs  T(C): 36.9 (03-10-21 @ 23:17), Max: 36.9 (03-10-21 @ 23:17)  T(F): 98.4 (03-10-21 @ 23:17), Max: 98.4 (03-10-21 @ 23:17)  HR: 67 (03-10-21 @ 23:17) (66 - 76)  BP: 144/68 (03-10-21 @ 23:17) (111/81 - 174/64)  BP(mean): --  RR: 20 (03-10-21 @ 23:17) (20 - 24)  SpO2: 100% (03-10-21 @ 23:17) (96% - 100%)                 PHYSICAL EXAM:  GENERAL: NAD, well-groomed, well-developed  HEAD:  Atraumatic, Normocephalic  EYES: EOMI, PERRLA, conjunctiva and sclera clear  NECK: Supple, No JVD, Normal thyroid, no HJR   CHEST/LUNG: Clear to auscultation bilaterally though difficult to appreciate fully given body habitus; No rales, rhonchi, wheezing, or rubs. Multiple scars on chest given hx of mastectomy   HEART: Regular rate and rhythm; No murmurs, rubs, or gallops though difficult to fully appreciate sounds given habitus   ABDOMEN: Soft, Nontender, Nondistended; Bowel sounds present. Noted subumbilical scar from previous colon resection and ileostomy reversal  EXTREMITIES:  2+ Peripheral Pulses up to PT. B/L Edema L>R up to knee. TTP. Shiny and mottled. Erythematous L>R. Right scar noted near tibia from previous known surgery.     NERVOUS SYSTEM:  Alert & Oriented X4, Good concentration  PSYCH: Normal Affect. Speaking in Full Sentences; not agitated

## 2021-03-10 NOTE — H&P ADULT - NSHPSOCIALHISTORY_GEN_ALL_CORE
Home: Domiciled alone  ADL: Full ADL with Rolling Walker  Next of Kin: Daughter, Son  Alcohol: Denies  Smoking: Denies   Drugs: Denies

## 2021-03-10 NOTE — H&P ADULT - PROBLEM SELECTOR PLAN 7
BP: 115-174 SBP here  - Will hold metoprolol given CHF exacerbation   - Will hold amlodipine given peripheral edema side effect profile   - CTM BP Pt with baseline hgb 9-10 w/ last normal 11/2020 (13)  - Likely 2/2 RF   - Iron studies: %Sat 9. Iron: 33 TIBC: 345 Ferritin 24  - B12: 839  - Folate: 11.6

## 2021-03-10 NOTE — H&P ADULT - PROBLEM SELECTOR PLAN 10
Ppx :   Diet:  Code: Full   Dispo: Pending hospital course Ppx: Eliquis 5mg BID    Diet: CC   Code: Full   Dispo: Pending hospital course

## 2021-03-10 NOTE — ED ADULT NURSE REASSESSMENT NOTE - NS ED NURSE REASSESS COMMENT FT1
pt edematous and only LUE allowed to be used. Unable to obtain PIV after 3 attempts. MÓNICA Guillen made aware. pt edematous and only LUE allowed to be used. Unable to obtain PIV after 3 attempts. PA Willsey made aware.

## 2021-03-10 NOTE — ED ADULT NURSE REASSESSMENT NOTE - NS ED NURSE REASSESS COMMENT FT1
Pt resting comfortably, endorses feeling better. A&Ox4, CUEVAS weakly d/t edema, distal pulses intact, abdomen soft, skin edematous. Side rails up for safety, call bell and personal items within reach, instructed to call for assistance, verbalizes understanding. Admitted pending bed assignment. Will continue to monitor.

## 2021-03-10 NOTE — ED PROVIDER NOTE - INTERPRETATION
EKG reviewed for rate, rhythm, axis, intervals and segments, including QRS morphology, P wave appearance T wave appearance, CO interval, and QT interval.  I find the EKG to be unremarkable in all of these regards except as follows: low voltage, poor R progression

## 2021-03-10 NOTE — H&P ADULT - NSHPLABSRESULTS_GEN_ALL_CORE
Dr. Hakan NOELJ:[id number]/NS: [phone]:  03-10    140  |  101  |  29<H>  ----------------------------<  158<H>  3.7   |  29  |  1.48<H>    Ca    9.8      10 Mar 2021 18:09    TPro  7.6  /  Alb  3.8  /  TBili  0.3  /  DBili  x   /  AST  17  /  ALT  14  /  AlkPhos  121<H>  0310    PT/INR - ( 10 Mar 2021 18:09 )   PT: 15.7 sec;   INR: 1.33 ratio                    Urinalysis Basic - ( 10 Mar 2021 19:10 )    Color: Light Yellow / Appearance: Clear / S.015 / pH: x  Gluc: x / Ketone: Negative  / Bili: Negative / Urobili: Negative   Blood: x / Protein: 30 mg/dL / Nitrite: Negative   Leuk Esterase: Moderate / RBC: 0 /hpf / WBC 0 /HPF   Sq Epi: x / Non Sq Epi: 1 /hpf / Bacteria: Negative                          9.5    8.02  )-----------( 302      ( 10 Mar 2021 18:09 )             33.2     CAPILLARY BLOOD GLUCOSE  Serum Pro-Brain Natriuretic Peptide: 2810 pg/mL (03.10.21 @ 18:09)    < from: Xray Chest 1 View- PORTABLE-Urgent (Xray Chest 1 View- PORTABLE-Urgent .) (03.10.21 @ 18:06) >    ******PRELIMINARY REPORT******    ******PRELIMINARY REPORT******          INTERPRETATION:  Pulmonary edema.    < end of copied text >      Dr. Hakan Daley PGY1 Dr. Hakan NOELJ:[id number]/NS: [phone]:  03-10    140  |  101  |  29<H>  ----------------------------<  158<H>  3.7   |  29  |  1.48<H>    Ca    9.8      10 Mar 2021 18:09    TPro  7.6  /  Alb  3.8  /  TBili  0.3  /  DBili  x   /  AST  17  /  ALT  14  /  AlkPhos  121<H>  0310    PT/INR - ( 10 Mar 2021 18:09 )   PT: 15.7 sec;   INR: 1.33 ratio                    Urinalysis Basic - ( 10 Mar 2021 19:10 )    Color: Light Yellow / Appearance: Clear / S.015 / pH: x  Gluc: x / Ketone: Negative  / Bili: Negative / Urobili: Negative   Blood: x / Protein: 30 mg/dL / Nitrite: Negative   Leuk Esterase: Moderate / RBC: 0 /hpf / WBC 0 /HPF   Sq Epi: x / Non Sq Epi: 1 /hpf / Bacteria: Negative                          9.5    8.02  )-----------( 302      ( 10 Mar 2021 18:09 )             33.2     CAPILLARY BLOOD GLUCOSE  Serum Pro-Brain Natriuretic Peptide: 2810 pg/mL (03.10.21 @ 18:09)    < from: Xray Chest 1 View- PORTABLE-Urgent (Xray Chest 1 View- PORTABLE-Urgent .) (03.10.21 @ 18:06) >    ******PRELIMINARY REPORT******    ******PRELIMINARY REPORT******          INTERPRETATION:  Pulmonary edema.    < end of copied text >  < from: Intra-Operative Transesophageal Echo (20 @ 17:37) >    Left Ventricle: Normal left ventricular systolic function.  No segmental wall motion abnormalities. Normal left  ventricular internal dimensions and wall thicknesses.  Moderate diastolic dysfunction (Stage II).    < end of copied text >    Dr. Hakan Daley PGY1 Dr. Hakan NOELJ:[id number]/NS: [phone]:  03-10    140  |  101  |  29<H>  ----------------------------<  158<H>  3.7   |  29  |  1.48<H>    Ca    9.8      10 Mar 2021 18:09    TPro  7.6  /  Alb  3.8  /  TBili  0.3  /  DBili  x   /  AST  17  /  ALT  14  /  AlkPhos  121<H>  0310    PT/INR - ( 10 Mar 2021 18:09 )   PT: 15.7 sec;   INR: 1.33 ratio                    Urinalysis Basic - ( 10 Mar 2021 19:10 )    Color: Light Yellow / Appearance: Clear / S.015 / pH: x  Gluc: x / Ketone: Negative  / Bili: Negative / Urobili: Negative   Blood: x / Protein: 30 mg/dL / Nitrite: Negative   Leuk Esterase: Moderate / RBC: 0 /hpf / WBC 0 /HPF   Sq Epi: x / Non Sq Epi: 1 /hpf / Bacteria: Negative                          9.5    8.02  )-----------( 302      ( 10 Mar 2021 18:09 )             33.2     CAPILLARY BLOOD GLUCOSE  Serum Pro-Brain Natriuretic Peptide: 2810 pg/mL (03.10.21 @ 18:09)    Troponin T, High Sensitivity (03.10.21 @ 22:45)    Troponin T, High Sensitivity Result: 27: Specimen not hemolyzed  Troponin T, High Sensitivity (03.10.21 @ 18:09)    Troponin T, High Sensitivity Result: 26: Specimen not hemolyzed    Rapid upward or downward changes in high-sensitivity troponin levels  suggest acute myocardial injury. Renal impairment may cause sustained  troponin elevations.  Normal: <6 - 14 ng/L  Indeterminate: 15-51 ng/L  Elevated: > 51 ng/L  See [email or web address]/[email or web address] on the [facility] [employer] for more  information ng/L      < from: Xray Chest 1 View- PORTABLE-Urgent (Xray Chest 1 View- PORTABLE-Urgent .) (03.10.21 @ 18:06) >    ******PRELIMINARY REPORT******    ******PRELIMINARY REPORT******          INTERPRETATION:  Pulmonary edema.    < end of copied text >  < from: Intra-Operative Transesophageal Echo (20 @ 17:37) >    Left Ventricle: Normal left ventricular systolic function.  No segmental wall motion abnormalities. Normal left  ventricular internal dimensions and wall thicknesses.  Moderate diastolic dysfunction (Stage II). Labs, imaging and EKG personally reviewed and interpreted by me.     LABS:  03-10    140  |  101  |  29<H>  ----------------------------<  158<H>  3.7   |  29  |  1.48<H>    Ca    9.8      10 Mar 2021 18:09    TPro  7.6  /  Alb  3.8  /  TBili  0.3  /  DBili  x   /  AST  17  /  ALT  14  /  AlkPhos  121<H>  03-10    PT/INR - ( 10 Mar 2021 18:09 )   PT: 15.7 sec;   INR: 1.33 ratio                    Urinalysis Basic - ( 10 Mar 2021 19:10 )    Color: Light Yellow / Appearance: Clear / S.015 / pH: x  Gluc: x / Ketone: Negative  / Bili: Negative / Urobili: Negative   Blood: x / Protein: 30 mg/dL / Nitrite: Negative   Leuk Esterase: Moderate / RBC: 0 /hpf / WBC 0 /HPF   Sq Epi: x / Non Sq Epi: 1 /hpf / Bacteria: Negative                          9.5    8.02  )-----------( 302      ( 10 Mar 2021 18:09 )             33.2     CAPILLARY BLOOD GLUCOSE  Serum Pro-Brain Natriuretic Peptide: 2810 pg/mL (03.10.21 @ 18:09)    Troponin T, High Sensitivity (03.10.21 @ 22:45)    Troponin T, High Sensitivity Result: 27: Specimen not hemolyzed  Troponin T, High Sensitivity (03.10.21 @ 18:09)    Troponin T, High Sensitivity Result: 26: Specimen not hemolyzed    Rapid upward or downward changes in high-sensitivity troponin levels  suggest acute myocardial injury. Renal impairment may cause sustained  troponin elevations.  Normal: <6 - 14 ng/L  Indeterminate: 15-51 ng/L  Elevated: > 51 ng/L  See [email or web address]/[email or web address] on the [facility] intranet for more  information ng/L      < from: Xray Chest 1 View- PORTABLE-Urgent (Xray Chest 1 View- PORTABLE-Urgent .) (03.10.21 @ 18:06) >    ******PRELIMINARY REPORT******    ******PRELIMINARY REPORT******          INTERPRETATION:  Pulmonary edema.    < end of copied text >  < from: Intra-Operative Transesophageal Echo (20 @ 17:37) >    Left Ventricle: Normal left ventricular systolic function.  No segmental wall motion abnormalities. Normal left  ventricular internal dimensions and wall thicknesses.  Moderate diastolic dysfunction (Stage II).

## 2021-03-10 NOTE — H&P ADULT - HISTORY OF PRESENT ILLNESS
79 y/o F hx/ HTN, HLD, DM2 not on insulin, CAD s/p 2-vessel CABG 11/9/2020, Stage II Diastolic CHF (EF 60% 11/20/20), Breast cancer s/p right partial mastectomy and chemo (1989), Rectal Ca s/p resection and radiation 2015, R calf DVT on elequis, anemia, CKDIII (1.59 baseline)  79 y/o F hx/ HTN, HLD, DM2 not on insulin, CAD s/p 2-vessel CABG 11/9/2020, Stage II Diastolic CHF (EF 60% 11/20/20), Breast cancer s/p right partial mastectomy and chemo (1989), Rectal Ca s/p resection and radiation 2015, R calf DVT on eliquis, anemia, CKDIII (Cr: 1.59 baseline) recent [facility] admission 2/4-2/8 for CHF exacerbation p/w increasing weight/swelling, sob, and difficulty ambulating x2 weeks. Spoke with daughter Shauna Jennings who confirmed that since she had come back from [facility] her mother had steadily worsened. Her dry weight was 178lb which slowly has increased to 212lbs. Her right leg was always mildly swollen at baseline but pt's son and daughter noted that while the right leg had been increasing, her left leg has now gotten more swollen. She had increasing shortness of breath, difficulty lying down, and would wake up in the middle of the night to catch her breath. She had been in contact with her cardiologist, Dr. Hagan who started her on 0.5mg Bumex for 2 weeks then on 3/8 increased it to 1mg and then added a one time dose of metolazone. This was all in preparation for her upcoming R hip surgery which was delayed given her worsening status. She also endorses 2d constipation. Pt denies F/N/V/Cough/CP/abd pain/D/ sick contacts/ or any notable event.       81 y/o F hx/ HTN, HLD, DM2 not on insulin, CAD s/p 2-vessel CABG 11/9/2020, Stage II Diastolic CHF (EF 60% 11/20/20), Breast cancer s/p right partial mastectomy and chemo (1989), Rectal Ca s/p resection and radiation 2015, R calf DVT on eliquis, anemia, CKDIII (Cr: 1.59 baseline) recent [facility] admission 2/4-2/8 for CHF exacerbation p/w increasing weight/swelling, sob, and difficulty ambulating x2 weeks. Spoke with daughter Shauna Jennings who confirmed that since she had come back from [facility] her mother had steadily worsened. Her dry weight was 178lb which slowly has increased to 212lbs. Her right leg was always mildly swollen at baseline but pt's son and daughter noted that while the right leg had been increasing, her left leg has now gotten more swollen. She had increasing shortness of breath, difficulty lying down, and would wake up in the middle of the night to catch her breath. She had been in contact with her cardiologist, Dr. Hagan who started her on 0.5mg Bumex for 2 weeks then on 3/8 increased it to 1mg and then added a one time dose of metolazone. This was all in preparation for her upcoming R hip surgery which was delayed given her worsening status. She also endorses 2d constipation. Pt denies F/N/V/Cough/CP/abd pain/D/ sick contacts/ or any notable event.      ED VS: T:98.4 HR: 57 BP: 144/68 RR: 24 O2: 100% 2LNC  s/p 40mg IV Lasix      79 y/o Telugu Speaking F hx/ HTN, HLD, DM2 not on insulin, CAD s/p 2-vessel CABG 11/9/2020, Stage II Diastolic CHF (EF 60% 11/20/20), Breast cancer s/p right partial mastectomy and chemo (1989), Rectal Ca s/p resection and radiation 2015, R calf DVT on eliquis, anemia, CKDIII (Cr: 1.59 baseline) recent [facility] admission 2/4-2/8 for CHF exacerbation p/w increasing weight/swelling, sob, and difficulty ambulating x2 weeks. Spoke with daughter Shauna Jennings who confirmed that since she had come back from [facility] her mother had steadily worsened. Her dry weight was 178lb which slowly has increased to 212lbs. Her right leg was always mildly swollen at baseline but pt and daughter noted that while the right leg had been increasing, her left leg has now gotten more swollen but both were baseline red. She had increasing shortness of breath, difficulty lying down, and would wake up in the middle of the night to catch her breath. She had been in contact with her cardiologist, Dr. Hagan who started her on 0.5mg Bumex for 2 weeks then on 3/8 increased it to 1mg and then added a one time dose of metolazone. This was all in preparation for her upcoming R hip surgery which was delayed given her worsening status. She also endorses 2d constipation. Pt denies F/N/V/Cough/CP/abd pain/D/ sick contacts/ or any notable event.      ED VS: T:98.4 HR: 57 BP: 144/68 RR: 24 O2: 100% 2LNC  s/p 40mg IV Lasix able to breath more comfortably now      79 y/o Kiswahili Speaking F hx/ HTN, HLD, DM2 not on insulin, CAD s/p 2-vessel CABG 11/9/2020, Stage II Diastolic CHF (EF 60% 11/20/20), Breast cancer s/p right partial mastectomy and chemo (1989), Rectal Ca s/p resection and radiation 2015, R calf DVT on eliquis, anemia, CKDIII (Cr: 1.59 baseline) recent [facility] admission 2/4-2/8 for CHF exacerbation p/w increasing weight/swelling, sob, and difficulty ambulating x2 weeks. Spoke with daughter Shauna Jennings who confirmed that since she had come back from [facility] her mother had steadily worsened. Her dry weight was 178lb which slowly has increased to 212lbs. Her right leg was always mildly swollen at baseline but pt and daughter noted that while the right leg had been increasing, her left leg has now gotten more swollen but both were baseline red. She had increasing shortness of breath, difficulty lying down, and would wake up in the middle of the night to catch her breath. She had been in contact with her cardiologist, Dr. Hagan who started her on 0.5mg Bumex for 2 weeks then on 3/8 increased it to 1mg and then added a one time dose of metolazone. This was all in preparation for her upcoming R hip surgery which was delayed given her worsening status. She also endorses 2d constipation. Pt denies F/N/V/Cough/CP/abd pain/D/ sick contacts/ or any notable event.      ED VS: T:98.4 HR: 57 BP: 144/68 RR: 24 O2: 100% 2LNC  s/p 40mg IV Lasix able to breath more comfortably now

## 2021-03-10 NOTE — H&P ADULT - NSHPREVIEWOFSYSTEMS_GEN_ALL_CORE
REVIEW OF SYSTEMS:  CONSTITUTIONAL: No weakness, fevers, chills, sick contacts, or unintended weight loss  EYES: No visual changes or vertigo  ENT: No throat pain, rhinorrhea, or hearing loss   NECK: No pain or stiffness  RESPIRATORY: No cough, wheezing, hemoptysis; No shortness of breath  CARDIOVASCULAR: No chest pain or palpitations  GASTROINTESTINAL: No abdominal or epigastric pain. No nausea, vomiting, or hematemesis; No diarrhea or constipation. No melena or hematochezia.  GENITOURINARY: No dysuria, frequency or hematuria  NEUROLOGICAL: No numbness or weakness  SKIN: No itching, rashes, or bruises  Psych: Good mood, no substance use REVIEW OF SYSTEMS:  CONSTITUTIONAL: No weakness, fevers, chills, sick contacts, or unintended weight loss. (+Weight gain)   EYES: No visual changes or vertigo  ENT: No throat pain, rhinorrhea, or hearing loss   NECK: No pain or stiffness  RESPIRATORY: No cough, wheezing, hemoptysis;   CARDIOVASCULAR: No chest pain or palpitations  GASTROINTESTINAL: No abdominal or epigastric pain. No nausea, vomiting, or hematemesis; No diarrhea. No melena or hematochezia.  GENITOURINARY: No dysuria, frequency or hematuria  NEUROLOGICAL: No numbness or weakness  SKIN: No itching, rashes, or bruises. +B/L LE Swelling and erythema   Psych: Good mood, no substance use

## 2021-03-10 NOTE — H&P ADULT - PROBLEM SELECTOR PLAN 6
BP: 115-174 SBP here  - Will hold metoprolol given CHF exacerbation   - Will hold amlodipine given peripheral edema side effect profile   - CTM BP Triple vessel disease s/p 2 vessel CABG   - c/w Simvastatin 40mg 2/16/21: A1c 7.5 Januvia and Glimepiride at home   - ISS, FS qACHS, CC Diet

## 2021-03-10 NOTE — ED PROVIDER NOTE - ATTENDING CONTRIBUTION TO CARE
82 yo female hx of CHF, RUE lymphedema p/w worsening BHAT and SOB.  hypoxic on exam here, mildly dyspneic, significant LE edema, concern for fluid overload.  EKG without STEMI.  CXR, diuresis, check labs, admit for further management.

## 2021-03-10 NOTE — H&P ADULT - PROBLEM SELECTOR PLAN 9
BP: 115-174 SBP here  - Will hold metoprolol given CHF exacerbation   - Will hold amlodipine given peripheral edema side effect profile   - CTM BP BP: 115-174 SBP here  - c/w metoprolol 150mg qd   - Will hold amlodipine given peripheral edema side effect profile   - CTM BP

## 2021-03-10 NOTE — ED ADULT NURSE REASSESSMENT NOTE - NS ED NURSE REASSESS COMMENT FT1
US guided PIV placed by MD Fletcher, lab work and COVID swab collected and sent. pt remains on cardiac monitor

## 2021-03-10 NOTE — H&P ADULT - PROBLEM SELECTOR PLAN 8
Ppx :   Diet:  Code: Full   Dispo: Pending hospital course Triple vessel disease s/p 2 vessel CABG   - c/w Simvastatin 40mg

## 2021-03-10 NOTE — ED PROVIDER NOTE - PHYSICAL EXAMINATION
A&Ox3, NAD. NCAT. PERRL, EOMI. Neck supple, no LAD. Lungs diminished BL lung fields, no wheezing. alightly tachypneic however speaking in full sentences, +S1S2, RRR, No m/r/g. Abd soft, NT/ND, +BS, no rebound or guarding. Extremities: cap refill <2, pulses in distal extremities 4+, 2+ BL pitting edema BL with overlaying erythema of BL LE (baseline per pt) CN II-XII intact. Strength 5/5 UE/LE. Sensations intact throughout.

## 2021-03-10 NOTE — H&P ADULT - PROBLEM SELECTOR PLAN 5
Triple vessel disease s/p 2 vessel CABG   - c/w Simvastatin 40mg 2/16/21: A1c 7.5 Januvia and Glimepiride at home   - ISS, FS qACHS, CC Diet CKDIII GFR:30; baseline Cr: 1.4   - Here 1.48 near baseline   - Monitor BMP QD, avoid nephrotoxic drugs CKDIII GFR:30; baseline Cr: 1.4-1.6  - Here 1.48 at baseline   - Monitor BMP QD, avoid nephrotoxic drugs

## 2021-03-11 ENCOUNTER — APPOINTMENT (OUTPATIENT)
Dept: CARDIOLOGY | Facility: CLINIC | Age: 81
End: 2021-03-11

## 2021-03-11 DIAGNOSIS — E11.9 TYPE 2 DIABETES MELLITUS WITHOUT COMPLICATIONS: ICD-10-CM

## 2021-03-11 DIAGNOSIS — N18.9 CHRONIC KIDNEY DISEASE, UNSPECIFIED: ICD-10-CM

## 2021-03-11 DIAGNOSIS — M79.89 OTHER SPECIFIED SOFT TISSUE DISORDERS: ICD-10-CM

## 2021-03-11 DIAGNOSIS — I25.10 ATHEROSCLEROTIC HEART DISEASE OF NATIVE CORONARY ARTERY WITHOUT ANGINA PECTORIS: ICD-10-CM

## 2021-03-11 DIAGNOSIS — N18.30 CHRONIC KIDNEY DISEASE, STAGE 3 UNSPECIFIED: ICD-10-CM

## 2021-03-11 DIAGNOSIS — I82.409 ACUTE EMBOLISM AND THROMBOSIS OF UNSPECIFIED DEEP VEINS OF UNSPECIFIED LOWER EXTREMITY: ICD-10-CM

## 2021-03-11 DIAGNOSIS — I10 ESSENTIAL (PRIMARY) HYPERTENSION: ICD-10-CM

## 2021-03-11 DIAGNOSIS — E78.5 HYPERLIPIDEMIA, UNSPECIFIED: ICD-10-CM

## 2021-03-11 DIAGNOSIS — I50.33 ACUTE ON CHRONIC DIASTOLIC (CONGESTIVE) HEART FAILURE: ICD-10-CM

## 2021-03-11 DIAGNOSIS — Z29.9 ENCOUNTER FOR PROPHYLACTIC MEASURES, UNSPECIFIED: ICD-10-CM

## 2021-03-11 LAB
ALBUMIN SERPL ELPH-MCNC: 3.5 G/DL — SIGNIFICANT CHANGE UP (ref 3.3–5)
ALP SERPL-CCNC: 111 U/L — SIGNIFICANT CHANGE UP (ref 40–120)
ALT FLD-CCNC: 13 U/L — SIGNIFICANT CHANGE UP (ref 10–45)
ANION GAP SERPL CALC-SCNC: 13 MMOL/L — SIGNIFICANT CHANGE UP (ref 5–17)
AST SERPL-CCNC: 14 U/L — SIGNIFICANT CHANGE UP (ref 10–40)
BASOPHILS # BLD AUTO: 0.02 K/UL — SIGNIFICANT CHANGE UP (ref 0–0.2)
BASOPHILS NFR BLD AUTO: 0.2 % — SIGNIFICANT CHANGE UP (ref 0–2)
BILIRUB SERPL-MCNC: 0.2 MG/DL — SIGNIFICANT CHANGE UP (ref 0.2–1.2)
BUN SERPL-MCNC: 28 MG/DL — HIGH (ref 7–23)
CALCIUM SERPL-MCNC: 9.2 MG/DL — SIGNIFICANT CHANGE UP (ref 8.4–10.5)
CHLORIDE SERPL-SCNC: 100 MMOL/L — SIGNIFICANT CHANGE UP (ref 96–108)
CO2 SERPL-SCNC: 29 MMOL/L — SIGNIFICANT CHANGE UP (ref 22–31)
CREAT SERPL-MCNC: 1.74 MG/DL — HIGH (ref 0.5–1.3)
EOSINOPHIL # BLD AUTO: 0.06 K/UL — SIGNIFICANT CHANGE UP (ref 0–0.5)
EOSINOPHIL NFR BLD AUTO: 0.7 % — SIGNIFICANT CHANGE UP (ref 0–6)
GLUCOSE BLDC GLUCOMTR-MCNC: 144 MG/DL — HIGH (ref 70–99)
GLUCOSE BLDC GLUCOMTR-MCNC: 153 MG/DL — HIGH (ref 70–99)
GLUCOSE BLDC GLUCOMTR-MCNC: 164 MG/DL — HIGH (ref 70–99)
GLUCOSE BLDC GLUCOMTR-MCNC: 195 MG/DL — HIGH (ref 70–99)
GLUCOSE BLDC GLUCOMTR-MCNC: 195 MG/DL — HIGH (ref 70–99)
GLUCOSE SERPL-MCNC: 196 MG/DL — HIGH (ref 70–99)
HCT VFR BLD CALC: 32.7 % — LOW (ref 34.5–45)
HGB BLD-MCNC: 9.5 G/DL — LOW (ref 11.5–15.5)
IMM GRANULOCYTES NFR BLD AUTO: 0.2 % — SIGNIFICANT CHANGE UP (ref 0–1.5)
LYMPHOCYTES # BLD AUTO: 1.07 K/UL — SIGNIFICANT CHANGE UP (ref 1–3.3)
LYMPHOCYTES # BLD AUTO: 12.8 % — LOW (ref 13–44)
MAGNESIUM SERPL-MCNC: 1.9 MG/DL — SIGNIFICANT CHANGE UP (ref 1.6–2.6)
MCHC RBC-ENTMCNC: 24.9 PG — LOW (ref 27–34)
MCHC RBC-ENTMCNC: 29.1 GM/DL — LOW (ref 32–36)
MCV RBC AUTO: 85.6 FL — SIGNIFICANT CHANGE UP (ref 80–100)
MONOCYTES # BLD AUTO: 0.73 K/UL — SIGNIFICANT CHANGE UP (ref 0–0.9)
MONOCYTES NFR BLD AUTO: 8.7 % — SIGNIFICANT CHANGE UP (ref 2–14)
NEUTROPHILS # BLD AUTO: 6.48 K/UL — SIGNIFICANT CHANGE UP (ref 1.8–7.4)
NEUTROPHILS NFR BLD AUTO: 77.4 % — HIGH (ref 43–77)
NRBC # BLD: 0 /100 WBCS — SIGNIFICANT CHANGE UP (ref 0–0)
PLATELET # BLD AUTO: 300 K/UL — SIGNIFICANT CHANGE UP (ref 150–400)
POTASSIUM SERPL-MCNC: 3.5 MMOL/L — SIGNIFICANT CHANGE UP (ref 3.5–5.3)
POTASSIUM SERPL-SCNC: 3.5 MMOL/L — SIGNIFICANT CHANGE UP (ref 3.5–5.3)
PROT SERPL-MCNC: 6.9 G/DL — SIGNIFICANT CHANGE UP (ref 6–8.3)
RBC # BLD: 3.82 M/UL — SIGNIFICANT CHANGE UP (ref 3.8–5.2)
RBC # FLD: 18 % — HIGH (ref 10.3–14.5)
SODIUM SERPL-SCNC: 142 MMOL/L — SIGNIFICANT CHANGE UP (ref 135–145)
WBC # BLD: 8.38 K/UL — SIGNIFICANT CHANGE UP (ref 3.8–10.5)
WBC # FLD AUTO: 8.38 K/UL — SIGNIFICANT CHANGE UP (ref 3.8–10.5)

## 2021-03-11 PROCEDURE — 93306 TTE W/DOPPLER COMPLETE: CPT | Mod: 26

## 2021-03-11 PROCEDURE — 93970 EXTREMITY STUDY: CPT | Mod: 26

## 2021-03-11 PROCEDURE — 99233 SBSQ HOSP IP/OBS HIGH 50: CPT | Mod: GC

## 2021-03-11 RX ORDER — METOPROLOL TARTRATE 50 MG
150 TABLET ORAL DAILY
Refills: 0 | Status: DISCONTINUED | OUTPATIENT
Start: 2021-03-11 | End: 2021-03-18

## 2021-03-11 RX ORDER — SODIUM CHLORIDE 9 MG/ML
1000 INJECTION, SOLUTION INTRAVENOUS
Refills: 0 | Status: DISCONTINUED | OUTPATIENT
Start: 2021-03-11 | End: 2021-03-16

## 2021-03-11 RX ORDER — FAMOTIDINE 10 MG/ML
40 INJECTION INTRAVENOUS AT BEDTIME
Refills: 0 | Status: DISCONTINUED | OUTPATIENT
Start: 2021-03-11 | End: 2021-03-15

## 2021-03-11 RX ORDER — SIMVASTATIN 20 MG/1
40 TABLET, FILM COATED ORAL AT BEDTIME
Refills: 0 | Status: DISCONTINUED | OUTPATIENT
Start: 2021-03-10 | End: 2021-03-17

## 2021-03-11 RX ORDER — INSULIN LISPRO 100/ML
VIAL (ML) SUBCUTANEOUS
Refills: 0 | Status: DISCONTINUED | OUTPATIENT
Start: 2021-03-11 | End: 2021-03-16

## 2021-03-11 RX ORDER — FUROSEMIDE 40 MG
40 TABLET ORAL
Refills: 0 | Status: DISCONTINUED | OUTPATIENT
Start: 2021-03-11 | End: 2021-03-12

## 2021-03-11 RX ORDER — GLUCAGON INJECTION, SOLUTION 0.5 MG/.1ML
1 INJECTION, SOLUTION SUBCUTANEOUS ONCE
Refills: 0 | Status: DISCONTINUED | OUTPATIENT
Start: 2021-03-11 | End: 2021-03-16

## 2021-03-11 RX ORDER — INSULIN LISPRO 100/ML
VIAL (ML) SUBCUTANEOUS AT BEDTIME
Refills: 0 | Status: DISCONTINUED | OUTPATIENT
Start: 2021-03-11 | End: 2021-03-16

## 2021-03-11 RX ORDER — DEXTROSE 50 % IN WATER 50 %
15 SYRINGE (ML) INTRAVENOUS ONCE
Refills: 0 | Status: DISCONTINUED | OUTPATIENT
Start: 2021-03-11 | End: 2021-03-16

## 2021-03-11 RX ORDER — METOPROLOL TARTRATE 50 MG
150 TABLET ORAL DAILY
Refills: 0 | Status: DISCONTINUED | OUTPATIENT
Start: 2021-03-11 | End: 2021-03-11

## 2021-03-11 RX ORDER — POLYETHYLENE GLYCOL 3350 17 G/17G
17 POWDER, FOR SOLUTION ORAL DAILY
Refills: 0 | Status: DISCONTINUED | OUTPATIENT
Start: 2021-03-11 | End: 2021-03-18

## 2021-03-11 RX ORDER — DEXTROSE 50 % IN WATER 50 %
25 SYRINGE (ML) INTRAVENOUS ONCE
Refills: 0 | Status: DISCONTINUED | OUTPATIENT
Start: 2021-03-11 | End: 2021-03-16

## 2021-03-11 RX ORDER — GABAPENTIN 400 MG/1
900 CAPSULE ORAL DAILY
Refills: 0 | Status: DISCONTINUED | OUTPATIENT
Start: 2021-03-10 | End: 2021-03-12

## 2021-03-11 RX ORDER — DEXTROSE 50 % IN WATER 50 %
12.5 SYRINGE (ML) INTRAVENOUS ONCE
Refills: 0 | Status: DISCONTINUED | OUTPATIENT
Start: 2021-03-11 | End: 2021-03-16

## 2021-03-11 RX ORDER — SENNA PLUS 8.6 MG/1
1 TABLET ORAL DAILY
Refills: 0 | Status: DISCONTINUED | OUTPATIENT
Start: 2021-03-11 | End: 2021-03-18

## 2021-03-11 RX ORDER — APIXABAN 2.5 MG/1
5 TABLET, FILM COATED ORAL
Refills: 0 | Status: DISCONTINUED | OUTPATIENT
Start: 2021-03-11 | End: 2021-03-14

## 2021-03-11 RX ORDER — POTASSIUM CHLORIDE 20 MEQ
20 PACKET (EA) ORAL
Refills: 0 | Status: COMPLETED | OUTPATIENT
Start: 2021-03-11 | End: 2021-03-11

## 2021-03-11 RX ORDER — ACETAMINOPHEN 500 MG
650 TABLET ORAL EVERY 6 HOURS
Refills: 0 | Status: DISCONTINUED | OUTPATIENT
Start: 2021-03-10 | End: 2021-03-18

## 2021-03-11 RX ORDER — GABAPENTIN 400 MG/1
900 CAPSULE ORAL
Qty: 0 | Refills: 0 | DISCHARGE

## 2021-03-11 RX ORDER — OXYCODONE AND ACETAMINOPHEN 5; 325 MG/1; MG/1
1 TABLET ORAL EVERY 6 HOURS
Refills: 0 | Status: DISCONTINUED | OUTPATIENT
Start: 2021-03-10 | End: 2021-03-16

## 2021-03-11 RX ORDER — IPRATROPIUM/ALBUTEROL SULFATE 18-103MCG
3 AEROSOL WITH ADAPTER (GRAM) INHALATION EVERY 6 HOURS
Refills: 0 | Status: DISCONTINUED | OUTPATIENT
Start: 2021-03-11 | End: 2021-03-18

## 2021-03-11 RX ADMIN — Medication 650 MILLIGRAM(S): at 12:35

## 2021-03-11 RX ADMIN — Medication 40 MILLIGRAM(S): at 05:18

## 2021-03-11 RX ADMIN — FAMOTIDINE 40 MILLIGRAM(S): 10 INJECTION INTRAVENOUS at 21:39

## 2021-03-11 RX ADMIN — SIMVASTATIN 40 MILLIGRAM(S): 20 TABLET, FILM COATED ORAL at 21:39

## 2021-03-11 RX ADMIN — Medication 40 MILLIGRAM(S): at 17:42

## 2021-03-11 RX ADMIN — Medication 650 MILLIGRAM(S): at 11:56

## 2021-03-11 RX ADMIN — GABAPENTIN 900 MILLIGRAM(S): 400 CAPSULE ORAL at 11:56

## 2021-03-11 RX ADMIN — Medication 150 MILLIGRAM(S): at 05:18

## 2021-03-11 RX ADMIN — Medication 20 MILLIEQUIVALENT(S): at 17:42

## 2021-03-11 RX ADMIN — Medication 20 MILLIEQUIVALENT(S): at 17:43

## 2021-03-11 RX ADMIN — Medication 2: at 11:58

## 2021-03-11 RX ADMIN — APIXABAN 5 MILLIGRAM(S): 2.5 TABLET, FILM COATED ORAL at 05:19

## 2021-03-11 RX ADMIN — POLYETHYLENE GLYCOL 3350 17 GRAM(S): 17 POWDER, FOR SOLUTION ORAL at 11:58

## 2021-03-11 RX ADMIN — Medication 2: at 17:44

## 2021-03-11 RX ADMIN — SENNA PLUS 1 TABLET(S): 8.6 TABLET ORAL at 11:56

## 2021-03-11 RX ADMIN — Medication 3 MILLILITER(S): at 06:17

## 2021-03-11 RX ADMIN — APIXABAN 5 MILLIGRAM(S): 2.5 TABLET, FILM COATED ORAL at 17:42

## 2021-03-11 NOTE — DIETITIAN INITIAL EVALUATION ADULT. - PHYSCIAL ASSESSMENT
overweight IBW%: 170%   Skin per nursing documentation: No pressure injuries noted.  Reported UBW dry used for BMI IBW%: 170%   Skin per nursing documentation: No pressure injuries noted.  Reported UBW dry used for BMI; of note <40 kg/m2

## 2021-03-11 NOTE — DIETITIAN INITIAL EVALUATION ADULT. - PERTINENT LABORATORY DATA
03-11 Na 142 mmol/L Glu 196 mg/dL<H> K+ 3.5 mmol/L Cr  1.74 mg/dL<H> BUN 28 mg/dL<H> Alb 3.5 g/dL   Hgb 9.5 g/dL<L> Hct 32.7 %<L>  02-16 A1C 7.5%   CAPILLARY BLOOD GLUCOSE  POCT Blood Glucose.: 164 mg/dL (11 Mar 2021 16:51)  POCT Blood Glucose.: 195 mg/dL (11 Mar 2021 11:55)  POCT Blood Glucose.: 144 mg/dL (11 Mar 2021 07:46)  POCT Blood Glucose.: 195 mg/dL (11 Mar 2021 04:42)

## 2021-03-11 NOTE — DIETITIAN INITIAL EVALUATION ADULT. - OTHER INFO
Pt with Hx of Type 2 diabetes; manages with Januvia and Glimepiride per H&P (Pt unable to confirm). Checks blood sugars x1 daily in the morning; reports a normal range of 200-300 mg/dL. Recent A1C 7.5% (2/16), indicating fair glycemic control.    Dosing wt: 220 lbs. Daily wt in lbs: 197 (3/11). Reports UBW of 270-280 lbs (question accuracy of reported wt). Per H&P, pt "dry weight was 178lb which slowly has increased to 212lbs." Pt noted with +3 Stanley. leg and ankle. RD will continue to trend as new wts available/able.     Pt is currently eating fair. Denies recent vomiting, diarrhea, or constipation. Last BM 3/11. Pt with some mild nausea.     RD provided dietary education, see additional chart note.

## 2021-03-11 NOTE — DIETITIAN INITIAL EVALUATION ADULT. - PROBLEM SELECTOR PLAN 7
Pt with baseline hgb 9-10 w/ last normal 11/2020 (13)  - Likely 2/2 RF   - Iron studies: %Sat 9. Iron: 33 TIBC: 345 Ferritin 24  - B12: 839  - Folate: 11.6

## 2021-03-11 NOTE — PROGRESS NOTE ADULT - SUBJECTIVE AND OBJECTIVE BOX
PROGRESS NOTE:     CONTACT INFO:  Darien Modi MD (Resident Physician - PGY-1 - Internal Medicine)  [email or web address]  Pager: [phone] (any site) or [phone] ([facility] only)    Patient is a 81y old  Female who presents with a chief complaint of CHF Exacerbation (11 Mar 2021 09:29)    Divehi  ID #[id number]    SUBJECTIVE / OVERNIGHT EVENTS:  Admitted o/n w/ADHF causing dyspnea and LE swelling; undergoing diuresis. Patient seen and evaluated at bedside. States she feels significant improvement vs. time of admission. Indicates the LE edema is also improving, but remains significant. Breathing improved, but still SOB even at rest. Cannot lay fully flat. No fever/chills. Denies chest pain, palpitations, abdominal pain, nausea/vomiting    ADDITIONAL REVIEW OF SYSTEMS:    MEDICATIONS  (STANDING):  apixaban 5 milliGRAM(s) Oral two times a day  dextrose 40% Gel 15 Gram(s) Oral once  dextrose 5%. 1000 milliLiter(s) (50 mL/Hr) IV Continuous <Continuous>  dextrose 5%. 1000 milliLiter(s) (100 mL/Hr) IV Continuous <Continuous>  dextrose 50% Injectable 25 Gram(s) IV Push once  dextrose 50% Injectable 12.5 Gram(s) IV Push once  dextrose 50% Injectable 25 Gram(s) IV Push once  famotidine    Tablet 40 milliGRAM(s) Oral at bedtime  furosemide   Injectable 40 milliGRAM(s) IV Push two times a day  gabapentin 900 milliGRAM(s) Oral daily  glucagon  Injectable 1 milliGRAM(s) IntraMuscular once  insulin lispro (ADMELOG) corrective regimen sliding scale   SubCutaneous three times a day before meals  insulin lispro (ADMELOG) corrective regimen sliding scale   SubCutaneous at bedtime  metoprolol succinate  milliGRAM(s) Oral daily  polyethylene glycol 3350 17 Gram(s) Oral daily  potassium chloride    Tablet ER 20 milliEquivalent(s) Oral every 2 hours  senna 1 Tablet(s) Oral daily  simvastatin 40 milliGRAM(s) Oral at bedtime    MEDICATIONS  (PRN):  acetaminophen   Tablet .. 650 milliGRAM(s) Oral every 6 hours PRN Mild Pain (1 - 3)  albuterol/ipratropium for Nebulization 3 milliLiter(s) Nebulizer every 6 hours PRN Shortness of Breath and/or Wheezing  oxycodone    5 mG/acetaminophen 325 mG 1 Tablet(s) Oral every 6 hours PRN Moderate Pain (4 - 6)      CAPILLARY BLOOD GLUCOSE      POCT Blood Glucose.: 195 mg/dL (11 Mar 2021 11:55)  POCT Blood Glucose.: 144 mg/dL (11 Mar 2021 07:46)  POCT Blood Glucose.: 195 mg/dL (11 Mar 2021 04:42)    I&O's Summary    10 Mar 2021 07:01  -  11 Mar 2021 07:00  --------------------------------------------------------  IN: 0 mL / OUT: 1600 mL / NET: -1600 mL    11 Mar 2021 07:01  -  11 Mar 2021 13:44  --------------------------------------------------------  IN: 120 mL / OUT: 700 mL / NET: -580 mL        PHYSICAL EXAM:  Vital Signs Last 24 Hrs  T(C): 36.7 (11 Mar 2021 13:32), Max: 37 (11 Mar 2021 04:40)  T(F): 98.1 (11 Mar 2021 13:32), Max: 98.6 (11 Mar 2021 04:40)  HR: 76 (11 Mar 2021 13:32) (66 - 76)  BP: 148/94 (11 Mar 2021 13:32) (111/81 - 174/64)  BP(mean): --  RR: 20 (11 Mar 2021 13:32) (20 - 24)  SpO2: 96% (11 Mar 2021 13:32) (96% - 100%)    GENERAL: Mild respiratory distress  HEAD: Atraumatic, Normocephalic  EYES: EOMI, PERRLA, conjunctiva and sclera clear  NECK: Mild JVD. FROM of neck   CHEST/LUNG: Mild tachypnea. Significantly diminished breathsounds in all auscultated fields; base > apices. Multiple scars on chest 2/2 mastectomy   HEART: RRR; No murmurs, rubs, or gallops. Slightly diminished heart sounds likely 2/2 habitus  ABDOMEN: Soft, Nontender, Nondistended; Bowel sounds present. Multiple operative scars on abdomen  EXTREMITIES:  2+ Peripheral Pulses x4. BL LE Edema L>R up to knee that is pitting and TTP. LLE below knee is shiny, erythematous, and mottled w/poor demarcation     NERVOUS SYSTEM:  Alert & Oriented X4, Good concentration  PSYCH: Normal Affect. Speaking in Full Sentences; not agitated    LABS:                        9.5    8.38  )-----------( 300      ( 11 Mar 2021 05:22 )             32.7     03-11    142  |  100  |  28<H>  ----------------------------<  196<H>  3.5   |  29  |  1.74<H>    Ca    9.2      11 Mar 2021 05:22  Mg     1.9     -    TPro  6.9  /  Alb  3.5  /  TBili  0.2  /  DBili  x   /  AST  14  /  ALT  13  /  AlkPhos  111  03-11    PT/INR - ( 10 Mar 2021 18:09 )   PT: 15.7 sec;   INR: 1.33 ratio               Urinalysis Basic - ( 10 Mar 2021 19:10 )    Color: Light Yellow / Appearance: Clear / S.015 / pH: x  Gluc: x / Ketone: Negative  / Bili: Negative / Urobili: Negative   Blood: x / Protein: 30 mg/dL / Nitrite: Negative   Leuk Esterase: Moderate / RBC: 0 /hpf / WBC 0 /HPF   Sq Epi: x / Non Sq Epi: 1 /hpf / Bacteria: Negative          RADIOLOGY & ADDITIONAL TESTS:  Results Reviewed:   Imaging Personally Reviewed:  Electrocardiogram Personally Reviewed:    COORDINATION OF CARE:  Care Discussed with Consultants/Other Providers [Y/N]:  Prior or Outpatient Records Reviewed [Y/N]:

## 2021-03-11 NOTE — PROGRESS NOTE ADULT - PROBLEM SELECTOR PLAN 5
CKDIII GFR:30; baseline Cr: 1.4-1.6  - Here 1.48 at baseline   - Monitor BMP QD, avoid nephrotoxic drugs

## 2021-03-11 NOTE — DIETITIAN INITIAL EVALUATION ADULT. - PROBLEM SELECTOR PLAN 1
Known Stage II Diastolic CHF (EF 60% 11/20/20) HJR- but Clinically Overloaded  - CXR showing pulmonary edema b/l; BNP: 2810; Qtc 486  - s/p 40mg IV lasix, 2 weeks 0.5 bumex, 3 days 1mg bumex, and 1 dose metolazone. C/w 40mg IV lasix BID, c/w metoprolol 150mg qd  - EKG normal: grossly normal, low voltage   - No clear inciting event; VSS, no overt signs/sx of infxn not in DKA, low suspicion of MI; perhaps DVT/PE vs. low suspicion of LLE cellulitis?   - Daily weights, fluid 1.5L restriction, salt restriction  - Notify Cardiologist: Dr. Ld Hagan

## 2021-03-11 NOTE — PHYSICAL THERAPY INITIAL EVALUATION ADULT - GAIT DEVIATIONS NOTED, PT EVAL
antalgic/decreased david/increased time in double stance/decreased step length/decreased stride length/decreased weight-shifting ability

## 2021-03-11 NOTE — DIETITIAN INITIAL EVALUATION ADULT. - PROBLEM SELECTOR PLAN 2
At this time LLE>R in diameter, more tender, and erythematous, shiny with good pulses   - Less likely cellulitis 2/2 chronic edema vs. DVT vs. CHF Exacerbation   - Duplex US B/L given possible DVT; Pt not tachycardic nor any longer tachypneic on 3LNC after lasix  - CHF exacerbation management as above  - Hold off on abx: low suspicion for cellulitis

## 2021-03-11 NOTE — PROGRESS NOTE ADULT - PROBLEM SELECTOR PLAN 2
At this time LLE>R in diameter, more tender, and erythematous, shiny with good pulses   - Less likely cellulitis 2/2 chronic edema vs. DVT vs. CHF Exacerbation   - Duplex US B/L given possible DVT; Pt not tachycardic nor any longer tachypneic on 3LNC after lasix  - CHF exacerbation management as above  - Hold off on abx: low suspicion for cellulitis At this time LLE>R in diameter, more tender, and erythematous, shiny with good pulses   - Less likely cellulitis 2/2 chronic edema vs. DVT vs. CHF Exacerbation   - Duplex US B/L given possible DVT; Pt not tachycardic nor any longer tachypneic on 3LNC after lasix  - CHF exacerbation management as above  - Hold off on abx: low suspicion for cellulitis  -f/u DVT study

## 2021-03-11 NOTE — CHART NOTE - NSCHARTNOTEFT_GEN_A_CORE
Hakan Edi | Reference #: [id number]       Patient Name: Fernanda Batista  YOB: 1940  Address: [address] [place] [address]  Sex: Female  Rx Written	Rx Dispensed	Drug	Quantity	Days Supply	Prescriber Name	Payment Method	Dispenser  03/02/2021 03/06/2021 	endocet  mg tablet 	90 	30 	VinceKim whitt 	[facility]  02/04/2021 02/08/2021 	oxycodone-acetaminophen  mg tab 	90 	30 	Rolan Dangelo 	[facility]  01/07/2021 01/09/2021 	oxycodone-acetaminophen  mg tab 	90 	30 	VinceKim whitt 	[facility]  11/19/2020 12/11/2020 	oxycodone hcl 5 mg tablet 	28 	7 	Misty Torres NP 	Insurance 	[employer]  11/19/2020 11/19/2020 	oxycodone hcl 5 mg tablet 	28 	7 	Misty Torres NP 	Insurance 	[employer]  10/16/2020 	10/16/2020 	oxycodone-acetaminophen  mg tab 	90 	30 	Vince, Kim 	[facility]  09/18/2020 09/18/2020 	oxycodone-acetaminophen  mg tab 	90 	30 	Vince, Kim 	[facility]  08/14/2020 08/14/2020 	oxycodone-acetaminophen  mg tab 	90 	30 	Chepe Moon M 	[facility]  07/17/2020 07/20/2020 	oxycodone-acetaminophen  mg tab 	90 	30 	Vince, Kim 	[facility]  06/19/2020 06/20/2020 	oxycodone-acetaminophen  mg tab 	90 	30 	Nallely Wolf 	[facility]  05/20/2020 05/20/2020 	oxycodone-acetaminophen  mg tab 	90 	30 	Rolan Dangelo 	[facility]  04/20/2020 04/23/2020 	xtampza er 9 mg capsule 	60 	30 	Rolan Dangelo 	[facility]  03/19/2020 03/26/2020 	xtampza er 9 mg capsule 	60 	30 	Rolan Dangelo 	[facility].

## 2021-03-11 NOTE — PROGRESS NOTE ADULT - PROBLEM SELECTOR PLAN 3
Previous hx of Right DVT 2019 on eliquis though patient was supposed to increase to 5mg bid only on 2.5mg bid  - At this time LLE>R in diameter, more tender, and erythematous  - Duplex US B/L given possible DVT; Pt not tachycardic nor any longer tachypneic on 3LNC; low utility of D-Dimer given CKD

## 2021-03-11 NOTE — PHYSICAL THERAPY INITIAL EVALUATION ADULT - ADDITIONAL COMMENTS
Pt daughter reports pt lives alone in an apartment with 4 steps to enter. Pt reports pt has been staying with son in a house with ramp entrance. Pt Son unable to assist pt 2/2 prior LE amputation. Pt was independent with rollator amb household distances prior to admit. Pt owns transport w/c, RW, & rollator.

## 2021-03-11 NOTE — PROGRESS NOTE ADULT - PROBLEM SELECTOR PLAN 1
Known Stage II Diastolic CHF (EF 60% 11/20/20) HJR- but Clinically Overloaded  - CXR showing pulmonary edema b/l; BNP: 2810; Qtc 486  - s/p 40mg IV lasix, 2 weeks 0.5 bumex, 3 days 1mg bumex, and 1 dose metolazone. C/w 40mg IV lasix BID, c/w metoprolol 150mg qd  - EKG normal: grossly normal, low voltage   - No clear inciting event; VSS, no overt signs/sx of infxn not in DKA, low suspicion of MI; perhaps DVT/PE vs. low suspicion of LLE cellulitis?   - Daily weights, fluid 1.5L restriction, salt restriction  - Notify Cardiologist: Dr. Ld Hagan Known Stage II Diastolic CHF (EF 60% 11/20/20) HJR- but Clinically Overloaded  - CXR showing pulmonary edema b/l; BNP: 2810; Qtc 486  - s/p 40mg IV lasix, 2 weeks 0.5 bumex, 3 days 1mg bumex, and 1 dose metolazone. C/w 40mg IV lasix BID, c/w metoprolol 150mg qd  - EKG normal: grossly normal, low voltage   - No clear inciting event; VSS, no overt signs/sx of infxn not in DKA, low suspicion of MI; perhaps DVT/PE vs. low suspicion of LLE cellulitis?   - Daily weights, fluid 1.5L restriction, salt restriction  -IV furosemide 40mg BID for now  -f/u echo  - Notify Cardiologist: Dr. Ld Hagan

## 2021-03-11 NOTE — DIETITIAN INITIAL EVALUATION ADULT. - PROBLEM SELECTOR PLAN 9
BP: 115-174 SBP here  - c/w metoprolol 150mg qd   - Will hold amlodipine given peripheral edema side effect profile   - CTM BP

## 2021-03-11 NOTE — DIETITIAN INITIAL EVALUATION ADULT. - LITERATURE/VIDEOS GIVEN
Heart failure nutrition therapy, reading heart healthy nutrition labels, heart healthy shopping tips and low sodium food list

## 2021-03-11 NOTE — DIETITIAN INITIAL EVALUATION ADULT. - REASON INDICATOR FOR ASSESSMENT
Pt seen for STAR education and BMI list (>40 kg/m2)  Source: Medical record and Pt (Estonian speaking,  #[id number], Stacey)

## 2021-03-11 NOTE — DIETITIAN INITIAL EVALUATION ADULT. - CHIEF COMPLAINT
The patient is a 82 y/o F hx/ HTN, HLD, DM2 not on insulin, CAD s/p 2-vessel CABG 11/9/2020, Stage II Diastolic CHF (EF 60% 11/20/20), Breast cancer s/p right partial mastectomy and chemo (1989), Rectal Ca s/p resection and radiation 2015, R calf DVT on eliquis, anemia, CKDIII (Cr: 1.59 baseline) recent [facility] admission 2/4-2/8 for CHF exacerbation p/w increasing weight/swelling, sob, and difficulty ambulating x2 weeks likely 2/2 CHF exacerbation with no known inciting event.

## 2021-03-11 NOTE — DIETITIAN INITIAL EVALUATION ADULT. - ADD RECOMMEND
2) Reinforce nutrition education as needed. 3) Continue to trend labs, weight, skin integrity, and intake.

## 2021-03-11 NOTE — CONSULT NOTE ADULT - SUBJECTIVE AND OBJECTIVE BOX
HPI:  Ms. 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 She is well-known to my partner Dr. Igor Kan. She presented last night to the [facility] ER with worsening shortness of breath for 2 weeks, as well as worsening swelling. Her symptoms have persisted/worsened despite uptitration of her oral diuretic regimen as outpatient. She was diagnosed upon admission with decompensated CHF.      PAST MEDICAL & SURGICAL HISTORY:  Type II diabetes mellitus  Obese  Hyperlipidemia  HTN (hypertension)  Renal insufficiency  Hypothyroidism  DVT of leg (deep venous thrombosis) -right calf DVT  2019 pt on Eliquis  CAD-CABG  CHF (congestive heart failure) - preserved EF  Breast CA, right - s/p mastectomy ,IV Chemotherapy  Lymphedema of arm -right arm s/p mastectomy  S/P TKR (total knee replacement), right -  Rectal cancer s/p chemo+RT ; s/p colon resection  +ileostomy==>reversal   History of appendectomy-    Allergies  CT scan dye (Hives)  penicillin (Unknown)    SOCIAL HISTORY:  Denies ETOh,Smoking,     FAMILY HISTORY:  Family history of heart attack (Child)  Family history of diabetes mellitus in son  Family history of diabetes mellitus in mother    REVIEW OF SYSTEMS:  CONSTITUTIONAL: No weakness, fevers or chills  EYES/ENT: No visual changes;  No vertigo or throat pain   NECK: No pain or stiffness  RESPIRATORY: No cough, wheezing, hemoptysis; (+)SOB  CARDIOVASCULAR: No chest pain or palpitations; (+)Swelling  GASTROINTESTINAL: No abdominal or epigastric pain. No nausea, vomiting, or hematemesis; No diarrhea or constipation. No melena or hematochezia.  GENITOURINARY: No dysuria, frequency or hematuria  NEUROLOGICAL: No numbness or weakness  SKIN: No itching, burning, rashes, or lesions   All other review of systems is negative unless indicated above.    VITAL:  T(C): , Max: 37 (21 @ 04:40)  T(F): , Max: 98.6 (21 @ 04:40)  HR: 73 (21 @ 04:40)  BP: 131/76 (21 @ 04:40)  RR: 20 (21 @ 08:08)  SpO2: 100% (21 @ 08:08)    PHYSICAL EXAM:  Constitutional: NAD, Alert  HEENT: NCAT, MMM  Neck: Supple, No JVD  Respiratory: CTA-b/l  Cardiovascular: RRR s1s2, no m/r/g  Gastrointestinal: BS+, soft, NT/ND  Extremities: No peripheral edema b/l  Neurological: no focal deficits; strength grossly intact  Back: no CVAT b/l  Skin: No rashes, no nevi    LABS:                        9.5    8.38  )-----------( 300      ( 11 Mar 2021 05:22 )             32.7     Na(142)/K(3.5)/Cl(100)/HCO3(29)/BUN(28)/Cr(1.74)Glu(196)/Ca(9.2)/Mg(1.9)/PO4(--)     @ 05:22  Na(140)/K(3.7)/Cl(101)/HCO3(29)/BUN(29)/Cr(1.48)Glu(158)/Ca(9.8)/Mg(--)/PO4(--)    03-10 @ 18:09    Urinalysis Basic - ( 10 Mar 2021 19:10 )  Color: Light Yellow / Appearance: Clear / S.015 / pH: x  Gluc: x / Ketone: Negative  / Bili: Negative / Urobili: Negative   Blood: x / Protein: 30 mg/dL / Nitrite: Negative   Leuk Esterase: Moderate / RBC: 0 /hpf / WBC 0 /HPF   Sq Epi: x / Non Sq Epi: 1 /hpf / Bacteria: Negative      IMAGING:  < from: Xray Chest 1 View- PORTABLE-Urgent (Xray Chest 1 View- PORTABLE-Urgent .) (03.10.21 @ 18:06) >  Pulmonary edema.      ASSESSMENT:  (1)Renal - CKD 3-4; minimal proteinuria - largely due to hypertension; potentially a component as well from diabetic nephropathy.     (2)TAO - creatinine up a bit as of today - obligate rise in setting of diuresis; not overly concerning    (3)Hypokalemia - diuretic-associated    (4)CV - acute on chronic HFpEF - on BID IV Lasix (40mg); appropriate for now      RECOMMEND:  (1)IV Lasix as ordered  (2)K+ repletion - 20meq po x 2  (3)BMP daily  (4)Dose new meds for GFR 30ml/min (present dosing is acceptable, including the Neurontin)    Thank you for involving [facility] in this patient's care.    With warm regards,    Ignacio Morataya MD   [facility]  Office: [phone]  Cell: [phone]               HPI: Ms. 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 She is well-known to my partner Dr. Igor Kan. She presented last night to the [facility] ER with worsening shortness of breath for 2 weeks, as well as worsening swelling. Her symptoms have persisted/worsened despite uptitration of her oral diuretic regimen as outpatient. She was diagnosed upon admission with decompensated CHF.      PAST MEDICAL & SURGICAL HISTORY:  Type II diabetes mellitus  Obese  Hyperlipidemia  HTN (hypertension)  Renal insufficiency  Hypothyroidism  DVT of leg (deep venous thrombosis) -right calf DVT  2019 pt on Eliquis  CAD-CABG  CHF (congestive heart failure) - preserved EF  Breast CA, right - s/p mastectomy ,IV Chemotherapy  Lymphedema of arm -right arm s/p mastectomy  S/P TKR (total knee replacement), right -  Rectal cancer s/p chemo+RT ; s/p colon resection  +ileostomy==>reversal   History of appendectomy-    Allergies  CT scan dye (Hives)  penicillin (Unknown)    SOCIAL HISTORY:  Denies ETOh,Smoking,     FAMILY HISTORY:  Family history of heart attack (Child)  Family history of diabetes mellitus in son  Family history of diabetes mellitus in mother    REVIEW OF SYSTEMS:  CONSTITUTIONAL: No weakness, fevers or chills  EYES/ENT: No visual changes;  No vertigo or throat pain   NECK: No pain or stiffness  RESPIRATORY: No cough, wheezing, hemoptysis; (+)SOB  CARDIOVASCULAR: No chest pain or palpitations; (+)Swelling  GASTROINTESTINAL: No abdominal or epigastric pain. No nausea, vomiting, or hematemesis; No diarrhea or constipation. No melena or hematochezia.  GENITOURINARY: No dysuria, frequency or hematuria  NEUROLOGICAL: No numbness or weakness  SKIN: No itching, burning, rashes, or lesions   All other review of systems is negative unless indicated above.    VITAL:  T(C): , Max: 37 (21 @ 04:40)  T(F): , Max: 98.6 (21 @ 04:40)  HR: 73 (21 @ 04:40)  BP: 131/76 (21 @ 04:40)  RR: 20 (21 @ 08:08)  SpO2: 100% (21 @ 08:08)    PHYSICAL EXAM:  Constitutional: NAD, Alert; obese  HEENT: NCAT, MMM  Neck: Supple, No JVD  Respiratory: CTA-b/l  Cardiovascular: RRR s1s2, no m/r/g  Gastrointestinal: BS+, soft, NT/ND  Extremities: 1-2+ b/l LE edema  Neurological: no focal deficits; strength grossly intact  Back: no CVAT b/l  Skin: No rashes, no nevi    LABS:                        9.5    8.38  )-----------( 300      ( 11 Mar 2021 05:22 )             32.7     Na(142)/K(3.5)/Cl(100)/HCO3(29)/BUN(28)/Cr(1.74)Glu(196)/Ca(9.2)/Mg(1.9)/PO4(--)     @ 05:22  Na(140)/K(3.7)/Cl(101)/HCO3(29)/BUN(29)/Cr(1.48)Glu(158)/Ca(9.8)/Mg(--)/PO4(--)    03-10 @ 18:09    Urinalysis Basic - ( 10 Mar 2021 19:10 )  Color: Light Yellow / Appearance: Clear / S.015 / pH: x  Gluc: x / Ketone: Negative  / Bili: Negative / Urobili: Negative   Blood: x / Protein: 30 mg/dL / Nitrite: Negative   Leuk Esterase: Moderate / RBC: 0 /hpf / WBC 0 /HPF   Sq Epi: x / Non Sq Epi: 1 /hpf / Bacteria: Negative      IMAGING:  < from: Xray Chest 1 View- PORTABLE-Urgent (Xray Chest 1 View- PORTABLE-Urgent .) (03.10.21 @ 18:06) >  Pulmonary edema.      ASSESSMENT:  (1)Renal - CKD 3-4; minimal proteinuria - largely due to hypertension; potentially a component as well from diabetic nephropathy.     (2)TAO - creatinine up a bit as of today - obligate rise in setting of diuresis; not overly concerning    (3)Hypokalemia - diuretic-associated    (4)CV - acute on chronic HFpEF - on BID IV Lasix (40mg); appropriate for now      RECOMMEND:  (1)IV Lasix as ordered  (2)K+ repletion - 20meq po x 2  (3)BMP daily  (4)Dose new meds for GFR 30ml/min (present dosing is acceptable, including the Neurontin)    Thank you for involving [facility] in this patient's care.    With warm regards,    Ignacio Morataya MD   [facility]  Office: [phone]  Cell: [phone]

## 2021-03-11 NOTE — DIETITIAN INITIAL EVALUATION ADULT. - ORAL INTAKE PTA/DIET HISTORY
Pt admitting MD at bedside, aware of pts bp. will continue to monitor.
Spoke to MD Mel Joshi about pts bp, will medicate as per ordered.
Pt reports eating well with no changes in appetite PTA. Pt was following a low salt diet. Pt declined to provide dietary recall. Confirms no known food allergies. Denies Hx of chewing or swallowing issues. Denies micronutrient or nutrient supplement use.

## 2021-03-11 NOTE — PHYSICAL THERAPY INITIAL EVALUATION ADULT - GENERAL OBSERVATIONS, REHAB EVAL
Pt tolerated 45min PT initial evaluation well. Pt rec'd in bed in NAD, premedicated. Pt daughter bedside translating to English as needed.

## 2021-03-11 NOTE — PROGRESS NOTE ADULT - ASSESSMENT
79 y/o F hx/ HTN, HLD, DM2 not on insulin, CAD s/p 2-vessel CABG 11/9/2020, Stage II Diastolic CHF (EF 60% 11/20/20), Breast cancer s/p right partial mastectomy and chemo (1989), Rectal Ca s/p resection and radiation 2015, R calf DVT on eliquis, anemia, CKDIII (Cr: 1.59 baseline) recent [facility] admission 2/4-2/8 for CHF exacerbation p/w increasing weight/swelling, sob, and difficulty ambulating x2 weeks likely 2/2 CHF exacerbation with no known inciting event.

## 2021-03-11 NOTE — PHYSICAL THERAPY INITIAL EVALUATION ADULT - PLANNED THERAPY INTERVENTIONS, PT EVAL
Stair training... GOAL: In 4 weeks pt will negotiate 4 stairs with min Ax1 & least restrictive device./balance training/bed mobility training/gait training/strengthening/transfer training

## 2021-03-11 NOTE — CHART NOTE - NSCHARTNOTEFT_GEN_A_CORE
RD CHF education chart note    Patient was visited by RD for CHF education. Heart failure education provided to the patient in detail. Discussed heart failure nutrition therapy, sodium and fluid intake, importance of diet adherence, daily weights monitoring with the patient. Reinforced importance of weight gain parameters and importance of contacting MD’s about weight changes. Provided handouts on heart failure nutrition therapy, reading heart healthy nutrition labels, heart healthy shopping tips and low sodium food list. Patient verbalized understanding demonstrated by teach back method. RD contact information left with patient for any future questioning.    Estefanía Dent MS, RD, CDN Pager #[phone] RD CHF education chart note    Patient was visited by RD for CHF education. Guyanese  used ([employer] ID#[id number]). Heart failure education provided to the patient in detail. Discussed heart failure nutrition therapy, sodium and fluid intake, importance of diet adherence, daily weights monitoring with the patient. Reinforced importance of weight gain parameters and importance of contacting MD’s about weight changes. Provided handouts on heart failure nutrition therapy, reading heart healthy nutrition labels, heart healthy shopping tips and low sodium food list in English for pt's daughter to read over at bedside.     Estefanía Dent, MS, RD, CDN Pager #[phone]

## 2021-03-11 NOTE — PHYSICAL THERAPY INITIAL EVALUATION ADULT - PERTINENT HX OF CURRENT PROBLEM, REHAB EVAL
81F with PMH: DM, HTN, CAD s/p CABG, CHF with preserved EF, past breast &rectal ca and stage 3-4 CKD. Presents ER with worsening SOB x2 weeks, & edema. Her symptoms have persisted/worsened despite uptitration of her oral diuretic regimen as outpatient. She was diagnosed upon admission with decompensated CHF.

## 2021-03-11 NOTE — DIETITIAN INITIAL EVALUATION ADULT. - PERTINENT MEDS FT
furosemide    insulin lispro (ADMELOG) corrective regimen sliding scale   SubCutaneous three times a day before meals  insulin lispro (ADMELOG) corrective regimen sliding scale   SubCutaneous at bedtime  metoprolol succinate  polyethylene glycol  potassium chloride     senna   simvastatin

## 2021-03-12 LAB
ALBUMIN SERPL ELPH-MCNC: 3.6 G/DL — SIGNIFICANT CHANGE UP (ref 3.3–5)
ALP SERPL-CCNC: 105 U/L — SIGNIFICANT CHANGE UP (ref 40–120)
ALT FLD-CCNC: 10 U/L — SIGNIFICANT CHANGE UP (ref 10–45)
ANION GAP SERPL CALC-SCNC: 12 MMOL/L — SIGNIFICANT CHANGE UP (ref 5–17)
AST SERPL-CCNC: 16 U/L — SIGNIFICANT CHANGE UP (ref 10–40)
BASOPHILS # BLD AUTO: 0.02 K/UL — SIGNIFICANT CHANGE UP (ref 0–0.2)
BASOPHILS NFR BLD AUTO: 0.3 % — SIGNIFICANT CHANGE UP (ref 0–2)
BILIRUB SERPL-MCNC: 0.3 MG/DL — SIGNIFICANT CHANGE UP (ref 0.2–1.2)
BUN SERPL-MCNC: 25 MG/DL — HIGH (ref 7–23)
CALCIUM SERPL-MCNC: 9.2 MG/DL — SIGNIFICANT CHANGE UP (ref 8.4–10.5)
CHLORIDE SERPL-SCNC: 92 MMOL/L — LOW (ref 96–108)
CO2 SERPL-SCNC: 36 MMOL/L — HIGH (ref 22–31)
CREAT SERPL-MCNC: 1.51 MG/DL — HIGH (ref 0.5–1.3)
EOSINOPHIL # BLD AUTO: 0.28 K/UL — SIGNIFICANT CHANGE UP (ref 0–0.5)
EOSINOPHIL NFR BLD AUTO: 3.8 % — SIGNIFICANT CHANGE UP (ref 0–6)
GLUCOSE BLDC GLUCOMTR-MCNC: 175 MG/DL — HIGH (ref 70–99)
GLUCOSE BLDC GLUCOMTR-MCNC: 176 MG/DL — HIGH (ref 70–99)
GLUCOSE BLDC GLUCOMTR-MCNC: 233 MG/DL — HIGH (ref 70–99)
GLUCOSE BLDC GLUCOMTR-MCNC: 238 MG/DL — HIGH (ref 70–99)
GLUCOSE SERPL-MCNC: 259 MG/DL — HIGH (ref 70–99)
HCT VFR BLD CALC: 33.7 % — LOW (ref 34.5–45)
HGB BLD-MCNC: 9.7 G/DL — LOW (ref 11.5–15.5)
IMM GRANULOCYTES NFR BLD AUTO: 0.3 % — SIGNIFICANT CHANGE UP (ref 0–1.5)
LYMPHOCYTES # BLD AUTO: 0.95 K/UL — LOW (ref 1–3.3)
LYMPHOCYTES # BLD AUTO: 13 % — SIGNIFICANT CHANGE UP (ref 13–44)
MAGNESIUM SERPL-MCNC: 1.8 MG/DL — SIGNIFICANT CHANGE UP (ref 1.6–2.6)
MCHC RBC-ENTMCNC: 24.7 PG — LOW (ref 27–34)
MCHC RBC-ENTMCNC: 28.8 GM/DL — LOW (ref 32–36)
MCV RBC AUTO: 86 FL — SIGNIFICANT CHANGE UP (ref 80–100)
MONOCYTES # BLD AUTO: 0.71 K/UL — SIGNIFICANT CHANGE UP (ref 0–0.9)
MONOCYTES NFR BLD AUTO: 9.7 % — SIGNIFICANT CHANGE UP (ref 2–14)
NEUTROPHILS # BLD AUTO: 5.32 K/UL — SIGNIFICANT CHANGE UP (ref 1.8–7.4)
NEUTROPHILS NFR BLD AUTO: 72.9 % — SIGNIFICANT CHANGE UP (ref 43–77)
NRBC # BLD: 0 /100 WBCS — SIGNIFICANT CHANGE UP (ref 0–0)
PHOSPHATE SERPL-MCNC: 2.7 MG/DL — SIGNIFICANT CHANGE UP (ref 2.5–4.5)
PLATELET # BLD AUTO: 287 K/UL — SIGNIFICANT CHANGE UP (ref 150–400)
POTASSIUM SERPL-MCNC: 3.6 MMOL/L — SIGNIFICANT CHANGE UP (ref 3.5–5.3)
POTASSIUM SERPL-SCNC: 3.6 MMOL/L — SIGNIFICANT CHANGE UP (ref 3.5–5.3)
PROT SERPL-MCNC: 6.8 G/DL — SIGNIFICANT CHANGE UP (ref 6–8.3)
RBC # BLD: 3.92 M/UL — SIGNIFICANT CHANGE UP (ref 3.8–5.2)
RBC # FLD: 18.1 % — HIGH (ref 10.3–14.5)
SODIUM SERPL-SCNC: 140 MMOL/L — SIGNIFICANT CHANGE UP (ref 135–145)
WBC # BLD: 7.3 K/UL — SIGNIFICANT CHANGE UP (ref 3.8–10.5)
WBC # FLD AUTO: 7.3 K/UL — SIGNIFICANT CHANGE UP (ref 3.8–10.5)

## 2021-03-12 PROCEDURE — 99233 SBSQ HOSP IP/OBS HIGH 50: CPT | Mod: GC

## 2021-03-12 PROCEDURE — 99223 1ST HOSP IP/OBS HIGH 75: CPT | Mod: GC

## 2021-03-12 RX ORDER — POTASSIUM CHLORIDE 20 MEQ
20 PACKET (EA) ORAL
Refills: 0 | Status: COMPLETED | OUTPATIENT
Start: 2021-03-12 | End: 2021-03-12

## 2021-03-12 RX ORDER — SPIRONOLACTONE 25 MG/1
25 TABLET, FILM COATED ORAL DAILY
Refills: 0 | Status: DISCONTINUED | OUTPATIENT
Start: 2021-03-12 | End: 2021-03-18

## 2021-03-12 RX ORDER — FUROSEMIDE 40 MG
40 TABLET ORAL
Refills: 0 | Status: DISCONTINUED | OUTPATIENT
Start: 2021-03-12 | End: 2021-03-15

## 2021-03-12 RX ORDER — FUROSEMIDE 40 MG
40 TABLET ORAL DAILY
Refills: 0 | Status: DISCONTINUED | OUTPATIENT
Start: 2021-03-12 | End: 2021-03-12

## 2021-03-12 RX ORDER — GABAPENTIN 400 MG/1
300 CAPSULE ORAL THREE TIMES A DAY
Refills: 0 | Status: DISCONTINUED | OUTPATIENT
Start: 2021-03-12 | End: 2021-03-15

## 2021-03-12 RX ADMIN — Medication 40 MILLIGRAM(S): at 05:49

## 2021-03-12 RX ADMIN — GABAPENTIN 300 MILLIGRAM(S): 400 CAPSULE ORAL at 13:26

## 2021-03-12 RX ADMIN — Medication 150 MILLIGRAM(S): at 05:49

## 2021-03-12 RX ADMIN — Medication 20 MILLIEQUIVALENT(S): at 17:28

## 2021-03-12 RX ADMIN — APIXABAN 5 MILLIGRAM(S): 2.5 TABLET, FILM COATED ORAL at 17:26

## 2021-03-12 RX ADMIN — POLYETHYLENE GLYCOL 3350 17 GRAM(S): 17 POWDER, FOR SOLUTION ORAL at 12:06

## 2021-03-12 RX ADMIN — Medication 40 MILLIGRAM(S): at 17:27

## 2021-03-12 RX ADMIN — Medication 650 MILLIGRAM(S): at 00:50

## 2021-03-12 RX ADMIN — SPIRONOLACTONE 25 MILLIGRAM(S): 25 TABLET, FILM COATED ORAL at 18:46

## 2021-03-12 RX ADMIN — GABAPENTIN 300 MILLIGRAM(S): 400 CAPSULE ORAL at 22:19

## 2021-03-12 RX ADMIN — Medication 650 MILLIGRAM(S): at 00:00

## 2021-03-12 RX ADMIN — Medication 20 MILLIEQUIVALENT(S): at 15:15

## 2021-03-12 RX ADMIN — FAMOTIDINE 40 MILLIGRAM(S): 10 INJECTION INTRAVENOUS at 22:18

## 2021-03-12 RX ADMIN — Medication 2: at 12:06

## 2021-03-12 RX ADMIN — SIMVASTATIN 40 MILLIGRAM(S): 20 TABLET, FILM COATED ORAL at 22:18

## 2021-03-12 RX ADMIN — SENNA PLUS 1 TABLET(S): 8.6 TABLET ORAL at 12:06

## 2021-03-12 RX ADMIN — Medication 4: at 17:28

## 2021-03-12 RX ADMIN — Medication 4: at 08:05

## 2021-03-12 RX ADMIN — APIXABAN 5 MILLIGRAM(S): 2.5 TABLET, FILM COATED ORAL at 05:49

## 2021-03-12 NOTE — PROGRESS NOTE ADULT - SUBJECTIVE AND OBJECTIVE BOX
PROGRESS NOTE:     CONTACT INFO:  Darien Modi MD (Resident Physician - PGY-1 - Internal Medicine)  [email or web address]  Pager: [phone] (any site) or [phone] ([facility] only)    Patient is a 81y old  Female who presents with a chief complaint of CHF Exacerbation (12 Mar 2021 15:05)    Interpretation services via  #[id number]    SUBJECTIVE / OVERNIGHT EVENTS:  No acute events overnight. Patient seen and evaluated at bedside. Good UOP. Pt indicating significant improvement of LE edema and respiratory status w/ongoing diuresis. Leg pain continues, but is improving w/ removal of fluid. No fever/chills. Minimal SOB at rest today. No visible tachypnea. Denies chest pain, palpitations, abdominal pain, nausea/vomiting    ADDITIONAL REVIEW OF SYSTEMS:    MEDICATIONS  (STANDING):  apixaban 5 milliGRAM(s) Oral two times a day  dextrose 40% Gel 15 Gram(s) Oral once  dextrose 5%. 1000 milliLiter(s) (50 mL/Hr) IV Continuous <Continuous>  dextrose 5%. 1000 milliLiter(s) (100 mL/Hr) IV Continuous <Continuous>  dextrose 50% Injectable 25 Gram(s) IV Push once  dextrose 50% Injectable 12.5 Gram(s) IV Push once  dextrose 50% Injectable 25 Gram(s) IV Push once  famotidine    Tablet 40 milliGRAM(s) Oral at bedtime  furosemide   Injectable 40 milliGRAM(s) IV Push two times a day  gabapentin 300 milliGRAM(s) Oral three times a day  glucagon  Injectable 1 milliGRAM(s) IntraMuscular once  insulin lispro (ADMELOG) corrective regimen sliding scale   SubCutaneous three times a day before meals  insulin lispro (ADMELOG) corrective regimen sliding scale   SubCutaneous at bedtime  metoprolol succinate  milliGRAM(s) Oral daily  polyethylene glycol 3350 17 Gram(s) Oral daily  senna 1 Tablet(s) Oral daily  simvastatin 40 milliGRAM(s) Oral at bedtime  spironolactone 25 milliGRAM(s) Oral daily    MEDICATIONS  (PRN):  acetaminophen   Tablet .. 650 milliGRAM(s) Oral every 6 hours PRN Mild Pain (1 - 3)  albuterol/ipratropium for Nebulization 3 milliLiter(s) Nebulizer every 6 hours PRN Shortness of Breath and/or Wheezing  oxycodone    5 mG/acetaminophen 325 mG 1 Tablet(s) Oral every 6 hours PRN Moderate Pain (4 - 6)      CAPILLARY BLOOD GLUCOSE      POCT Blood Glucose.: 233 mg/dL (12 Mar 2021 16:45)  POCT Blood Glucose.: 176 mg/dL (12 Mar 2021 11:45)  POCT Blood Glucose.: 238 mg/dL (12 Mar 2021 07:54)  POCT Blood Glucose.: 153 mg/dL (11 Mar 2021 21:37)    I&O's Summary    11 Mar 2021 07:01  -  12 Mar 2021 07:00  --------------------------------------------------------  IN: 585 mL / OUT: 4600 mL / NET: -4015 mL    12 Mar 2021 07:01  -  12 Mar 2021 17:47  --------------------------------------------------------  IN: 480 mL / OUT: 1200 mL / NET: -720 mL        PHYSICAL EXAM:  Vital Signs Last 24 Hrs  T(C): 36.7 (12 Mar 2021 12:05), Max: 36.7 (12 Mar 2021 12:05)  T(F): 98 (12 Mar 2021 12:05), Max: 98 (12 Mar 2021 12:05)  HR: 78 (12 Mar 2021 12:05) (73 - 79)  BP: 148/71 (12 Mar 2021 12:05) (142/67 - 161/61)  BP(mean): --  RR: 17 (12 Mar 2021 12:05) (17 - 18)  SpO2: 93% (12 Mar 2021 12:05) (93% - 100%)    GENERAL: NAD; seated upright in bed w/head of bed @ 45*  HEAD: Atraumatic, Normocephalic  EYES: EOMI, PERRLA, conjunctiva and sclera clear  NECK: Mild JVD. FROM of neck   CHEST/LUNG: No tachypnea. Moderately diminished breathsounds in all auscultated fields; base > apices. General pulm exam improved vs. prior. Multiple scars on chest 2/2 mastectomy   HEART: RRR; No murmurs, rubs, or gallops. Slightly diminished heart sounds likely 2/2 habitus  ABDOMEN: Soft, Nontender, Nondistended; Bowel sounds present. Multiple operative scars on abdomen  EXTREMITIES:  2+ Peripheral Pulses x4. Trace BL LE Edema today; L>R. Mildy TTP of BL legs. LLE below knee remains moderately erythematous, and mottled w/poor demarcation. LLE generally improved vs. prior and less concerning for cellulitis at this time     NERVOUS SYSTEM:  Alert & Oriented X4, Good concentration  PSYCH: Normal Affect. Speaking in Full Sentences; not agitated    LABS:                        9.7    7.30  )-----------( 287      ( 12 Mar 2021 09:21 )             33.7     03-12    140  |  92<L>  |  25<H>  ----------------------------<  259<H>  3.6   |  36<H>  |  1.51<H>    Ca    9.2      12 Mar 2021 09:21  Phos  2.7     03-12  Mg     1.8     03-12    TPro  6.8  /  Alb  3.6  /  TBili  0.3  /  DBili  x   /  AST  16  /  ALT  10  /  AlkPhos  105  03-12    PT/INR - ( 10 Mar 2021 18:09 )   PT: 15.7 sec;   INR: 1.33 ratio               Urinalysis Basic - ( 10 Mar 2021 19:10 )    Color: Light Yellow / Appearance: Clear / S.015 / pH: x  Gluc: x / Ketone: Negative  / Bili: Negative / Urobili: Negative   Blood: x / Protein: 30 mg/dL / Nitrite: Negative   Leuk Esterase: Moderate / RBC: 0 /hpf / WBC 0 /HPF   Sq Epi: x / Non Sq Epi: 1 /hpf / Bacteria: Negative          RADIOLOGY & ADDITIONAL TESTS:  Results Reviewed:   Imaging Personally Reviewed:  Electrocardiogram Personally Reviewed:    COORDINATION OF CARE:  Care Discussed with Consultants/Other Providers [Y/N]:  Prior or Outpatient Records Reviewed [Y/N]:

## 2021-03-12 NOTE — CONSULT NOTE ADULT - ASSESSMENT
79 yo F with PMH of HTN, HLD T2DM A1c 7.2, CAD s/p 2V CABG 11/2020 LIMA-LAD and SVG-RCA, breast CA s/p partial mastectomy and chemo in 1989, rectal CA s/p resection/RT in 2015, RLE DVT on Eliquis, and CKD3 p/w dHF exacerbation. Cardiology consulted for worsening PHTN.     -RVSP can be exaggerated in the setting of fluid overloaded state and with severe TR.   -with elevated proBNP from baseline, would continue IV lasix 40 mg BID with strict I/O and monitor lytes to maintain goal K~4.5 and Mg~2.5  -for PHTN, likely Group 2 vs Group 4 given her hx of DVT despite on AC, would recommend VQ scan and RHC when more euvolemic   -would start Aldactone 25 mg BID for diastolic HF, monitor K  -Cont renally dosed Eliquis 2.5 mg BID    -Cont Toprol  mg QD   -Cont Zocor 40 mg QHS     Thank you,   Rosanne Fabian MD  Cardiology Fellow, [facility]  All Cardiology service information can be found 24/7 on [email or web address], password: [email or web address]     79 yo F with PMH of HTN, HLD T2DM A1c 7.2, CAD s/p 2V CABG 11/2020 LIMA-LAD and SVG-RCA, breast CA s/p partial mastectomy and chemo in 1989, rectal CA s/p resection/RT in 2015, RLE DVT on Eliquis, and CKD3 p/w dHF exacerbation. Cardiology consulted for worsening PHTN.     -RVSP can be exaggerated in the setting of fluid overloaded state and with severe TR. repeat TTE when more euvolemic  -with elevated proBNP from baseline, would continue IV lasix 40 mg BID with strict I/O and monitor lytes to maintain goal K~4.5 and Mg~2.5  -would start Aldactone 25 mg BID for diastolic HF, monitor K  -Cont renally dosed Eliquis 2.5 mg BID    -Cont Toprol  mg QD   -Cont Zocor 40 mg QHS     Thank you,   Rosanne Fabian MD  Cardiology Fellow, [facility]  All Cardiology service information can be found 24/7 on [email or web address], password: [email or web address]

## 2021-03-12 NOTE — PROGRESS NOTE ADULT - ASSESSMENT
81 y/o F hx/ HTN, HLD, DM2 not on insulin, CAD s/p 2-vessel CABG 11/9/2020, Stage II Diastolic CHF (EF 60% 11/20/20), Breast cancer s/p right partial mastectomy and chemo (1989), Rectal Ca s/p resection and radiation 2015, R calf DVT on eliquis, anemia, CKDIII (Cr: 1.59 baseline) recent [facility] admission 2/4-2/8 for CHF exacerbation p/w increasing weight/swelling, sob, and difficulty ambulating x2 weeks likely 2/2 CHF exacerbation with no known inciting event.  79 y/o F hx/ HTN, HLD, DM2 not on insulin, CAD s/p 2-vessel CABG 11/9/2020, Stage II Diastolic CHF (EF 60% 11/20/20), Breast cancer s/p right partial mastectomy and chemo (1989), Rectal Ca s/p resection and radiation 2015, R calf DVT on eliquis, anemia, CKDIII (Cr: 1.59 baseline) recent [facility] admission 2/4-2/8 for CHF exacerbation p/w increasing weight/swelling, sob, and difficulty ambulating x2 weeks likely 2/2 CHF exacerbation with no known inciting event.     -Echo w/severe pulmHTN; cardiology concerned for biased interpretation given severe hypervolemia at time of exam. Will repeat when more euvolemic  -Significant improvement of BL LE exam; diuresis progressing well  -Still w/volume overload in the lungs, but improve vs. prior  -Pt having some LLE pain still, improved vs prior

## 2021-03-12 NOTE — CONSULT NOTE ADULT - ATTENDING COMMENTS
80 year old woman with two vessel coronary bypass in December 2020, ongoing heart failure with preserved systolic function. Admitted again with volume overload, lower extremity edema, chronic stasis, TAO on CKD. Echo continues to demonstrate preserved systolic function, signs of diastolic dysfunction and now more elevated pulmonary pressure with preserved RV function. 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

## 2021-03-12 NOTE — CONSULT NOTE ADULT - SUBJECTIVE AND OBJECTIVE BOX
Patient seen and evaluated at bedside    Chief Complaint: HF exacerbation     HPI:  79 yo F with PMH of HTN, HLD T2DM A1c 7.2, CAD s/p 2V CABG 11/2020 LIMA-LAD and SVG-RCA, breast CA s/p partial mastectomy and chemo in 1989, rectal CA s/p resection/RT in 2015, RLE DVT on Eliquis, and CKD3 p/w dHF exacerbation. Cardiology consulted for worsening PHTN.     Primary Team HPI     81 y/o [place] Speaking F hx/ HTN, HLD, DM2 not on insulin, CAD s/p 2-vessel CABG 11/9/2020, Stage II Diastolic CHF (EF 60% 11/20/20), Breast cancer s/p right partial mastectomy and chemo (1989), Rectal Ca s/p resection and radiation 2015, R calf DVT on eliquis, anemia, CKDIII (Cr: 1.59 baseline) recent [facility] admission 2/4-2/8 for CHF exacerbation p/w increasing weight/swelling, sob, and difficulty ambulating x2 weeks. Spoke with daughter Shauna Jennings who confirmed that since she had come back from [facility] her mother had steadily worsened. Her dry weight was 178lb which slowly has increased to 212lbs. Her right leg was always mildly swollen at baseline but pt and daughter noted that while the right leg had been increasing, her left leg has now gotten more swollen but both were baseline red. She had increasing shortness of breath, difficulty lying down, and would wake up in the middle of the night to catch her breath. She had been in contact with her cardiologist, Dr. Hagan who started her on 0.5mg Bumex for 2 weeks then on 3/8 increased it to 1mg and then added a one time dose of metolazone. This was all in preparation for her upcoming R hip surgery which was delayed given her worsening status. She also endorses 2d constipation. Pt denies F/N/V/Cough/CP/abd pain/D/ sick contacts/ or any notable event.      ED VS: T:98.4 HR: 57 BP: 144/68 RR: 24 O2: 100% 2LNC  s/p 40mg IV Lasix able to breath more comfortably now      (10 Mar 2021 20:22)      PMHx:   Renal insufficiency    Elevated blood pressure reading in office with diagnosis of hypertension    DVT of leg (deep venous thrombosis)    CHF (congestive heart failure)    Type II diabetes mellitus    Obese    S/P radiation &gt; 12 weeks    S/P chemotherapy, time since greater than 12 weeks    Rectal cancer    Hyperlipidemia    Lymphedema of arm    Diabetes mellitus    HTN (hypertension)    Breast CA, right    Rectal cancer    Cancer    Lymphedema of arm    Colon cancer    Breast CA, right    Obesity (BMI 35.0-39.9 without comorbidity)    Hyperlipidemia    HTN (hypertension)    DM (diabetes mellitus)        PSHx:   S/P TKR (total knee replacement), right    S/P colon resection    S/P ileostomy    History of appendectomy    S/P mastectomy, right    S/P colectomy    History of tonsillectomy    H/O mastectomy, right        Allergies:  CT scan dye (Hives)  penicillin (Unknown)      Home Meds:    Current Medications:   acetaminophen   Tablet .. 650 milliGRAM(s) Oral every 6 hours PRN  albuterol/ipratropium for Nebulization 3 milliLiter(s) Nebulizer every 6 hours PRN  apixaban 5 milliGRAM(s) Oral two times a day  dextrose 40% Gel 15 Gram(s) Oral once  dextrose 5%. 1000 milliLiter(s) IV Continuous <Continuous>  dextrose 5%. 1000 milliLiter(s) IV Continuous <Continuous>  dextrose 50% Injectable 25 Gram(s) IV Push once  dextrose 50% Injectable 12.5 Gram(s) IV Push once  dextrose 50% Injectable 25 Gram(s) IV Push once  famotidine    Tablet 40 milliGRAM(s) Oral at bedtime  furosemide   Injectable 40 milliGRAM(s) IV Push two times a day  gabapentin 300 milliGRAM(s) Oral three times a day  glucagon  Injectable 1 milliGRAM(s) IntraMuscular once  insulin lispro (ADMELOG) corrective regimen sliding scale   SubCutaneous three times a day before meals  insulin lispro (ADMELOG) corrective regimen sliding scale   SubCutaneous at bedtime  metoprolol succinate  milliGRAM(s) Oral daily  oxycodone    5 mG/acetaminophen 325 mG 1 Tablet(s) Oral every 6 hours PRN  polyethylene glycol 3350 17 Gram(s) Oral daily  senna 1 Tablet(s) Oral daily  simvastatin 40 milliGRAM(s) Oral at bedtime      FAMILY HISTORY:  Family history of heart attack (Child)    Family history of diabetes mellitus in son    Family history of diabetes mellitus in mother        Social History: Personally reviewed   No tobacco, EtOH or IVDU     REVIEW OF SYSTEMS:  Constitutional:     [x ] negative [ ] fevers [ ] chills [ ] weight loss [ ] weight gain  HEENT:                  [x ] negative [ ] dry eyes [ ] eye irritation [ ] postnasal drip [ ] nasal congestion  CV:                         [ x] negative  [ ] chest pain [ ] orthopnea [ ] palpitations [ ] murmur  Resp:                     [x ] negative [ ] cough [ ] shortness of breath [ ] dyspnea [ ] wheezing [ ] sputum [ ]hemoptysis  GI:                          [ x] negative [ ] nausea [ ] vomiting [ ] diarrhea [ ] constipation [ ] abd pain [ ] dysphagia   :                        [ x] negative [ ] dysuria [ ] nocturia [ ] hematuria [ ] increased urinary frequency  Musculoskeletal: [x ] negative [ ] back pain [ ] myalgias [ ] arthralgias [ ] fracture  Skin:                       [ x] negative [ ] rash [ ] itch  Neurological:        [ x] negative [ ] headache [ ] dizziness [ ] syncope [ ] weakness [ ] numbness  Psychiatric:           [ x] negative [ ] anxiety [ ] depression  Endocrine:            [ x] negative [ ] diabetes [ ] thyroid problem  Heme/Lymph:      [ x] negative [ ] anemia [ ] bleeding problem  Allergic/Immune: [ x] negative [ ] itchy eyes [ ] nasal discharge [ ] hives [ ] angioedema    [ x] All other systems negative  [ ] Unable to assess ROS due to      Physical Exam:  T(F): 98 (03-12), Max: 98 (03-12)  HR: 78 (03-12) (73 - 79)  BP: 148/71 (03-12) (142/67 - 161/61)  RR: 17 (03-12)  SpO2: 93% (03-12)    GENERAL: No acute distress, well-developed  HEAD:  Atraumatic, Normocephalic  ENT: EOMI, PERRLA, conjunctiva and sclera clear, Neck supple, JVD 11cm, moist mucosa  CHEST/LUNG: Clear to auscultation bilaterally; No wheeze, equal breath sounds bilaterally   BACK: No spinal tenderness  HEART: Regular rate and rhythm; soft systolic murmur at the apex, + surgical scars   ABDOMEN: Soft, Nontender, Nondistended; Bowel sounds present  EXTREMITIES:  b/l pitting edema L>R with mottled appearance   PSYCH: Nl behavior, nl affect  NEUROLOGY: AAOx3, non-focal, cranial nerves intact    Cardiovascular Diagnostic Testing:    ECG: Personally reviewed    Echo: Personally reviewed    3/11   Conclusions:  1. Normal left ventricular internal dimensions and wall  thicknesses.  2. Endocardium not well visualized; grossly normal left  ventricular systolic function.  3. Moderate right atrial enlargement.  4. Right ventricular enlargement with normal right  ventricular systolic function (TAPSE 2.2 cm).  5. Estimated pulmonary artery systolic pressure equals 65  mm Hg, assuming rightatrial pressure equals 10 mm Hg,  consistent with severe pulmonary pressures.    Angiogram: Personally reviewed   CORONARY VESSELS: The coronary circulation is right dominant.  LM:   --  LM: Angiography showed moderate atherosclerosis.  LAD:   --  Proximal LAD: There was a diffuse 60 % stenosis.  CX:   --  Ostial circumflex: There was a 90 % stenosis.  RCA:   --  Ostial RCA: There was a 90 % stenosis.    Imaging:    CXR: Personally reviewed    Labs: Personally reviewed                        9.7    7.30  )-----------( 287      ( 12 Mar 2021 09:21 )             33.7     03-12    140  |  92<L>  |  25<H>  ----------------------------<  259<H>  3.6   |  36<H>  |  1.51<H>    Ca    9.2      12 Mar 2021 09:21  Phos  2.7     03-12  Mg     1.8     03-12    TPro  6.8  /  Alb  3.6  /  TBili  0.3  /  DBili  x   /  AST  16  /  ALT  10  /  AlkPhos  105  03-12    PT/INR - ( 10 Mar 2021 18:09 )   PT: 15.7 sec;   INR: 1.33 ratio           Serum Pro-Brain Natriuretic Peptide: 2810 pg/mL (03-10 @ 18:09)

## 2021-03-12 NOTE — PROGRESS NOTE ADULT - PROBLEM SELECTOR PLAN 1
Known Stage II Diastolic CHF (EF 60% 11/20/20) HJR- but Clinically Overloaded  - CXR showing pulmonary edema b/l; BNP: 2810; Qtc 486  - s/p 40mg IV lasix, 2 weeks 0.5 bumex, 3 days 1mg bumex, and 1 dose metolazone. C/w 40mg IV lasix BID, c/w metoprolol 150mg qd  - EKG normal: grossly normal, low voltage   - No clear inciting event; VSS, no overt signs/sx of infxn not in DKA, low suspicion of MI; perhaps DVT/PE vs. low suspicion of LLE cellulitis?   - Daily weights, fluid 1.5L restriction, salt restriction  -IV furosemide 40mg BID for now  -f/u echo  - Notify Cardiologist: Dr. Ld Hagan Known Stage II Diastolic CHF (EF 60% 11/20/20) HJR- but Clinically Overloaded  - CXR showing pulmonary edema b/l; BNP: 2810; Qtc 486  - s/p 40mg IV lasix, 2 weeks 0.5 bumex, 3 days 1mg bumex, and 1 dose metolazone. C/w 40mg IV lasix BID, c/w metoprolol 150mg qd  - EKG normal: grossly normal, low voltage   - No clear inciting event; VSS, no overt signs/sx of infxn not in DKA, low suspicion of MI; perhaps DVT/PE vs. low suspicion of LLE cellulitis?   - Daily weights, fluid 1.5L restriction, salt restriction  -IV furosemide 40mg BID for now  -Echo w/severe pulmHTN, but likely 2/2 severe volume overload  -Repeat echo when more euvolemic  - Notify Cardiologist: Dr. Ld Hagan

## 2021-03-12 NOTE — PROGRESS NOTE ADULT - PROBLEM SELECTOR PLAN 2
At this time LLE>R in diameter, more tender, and erythematous, shiny with good pulses   - Less likely cellulitis 2/2 chronic edema vs. DVT vs. CHF Exacerbation   - Duplex US B/L given possible DVT; Pt not tachycardic nor any longer tachypneic on 3LNC after lasix  - CHF exacerbation management as above  - Hold off on abx: low suspicion for cellulitis  -f/u DVT study At this time LLE>R in diameter, more tender, and erythematous, shiny with good pulses   - Less likely cellulitis 2/2 chronic edema vs. DVT vs. CHF Exacerbation   - Duplex US B/L given possible DVT; Pt not tachycardic nor any longer tachypneic on 3LNC after lasix  - CHF exacerbation management as above  - Hold off on abx: low suspicion for cellulitis  -Negative dvt study  -improving

## 2021-03-12 NOTE — PROGRESS NOTE ADULT - PROBLEM SELECTOR PLAN 5
CKDIII GFR:30; baseline Cr: 1.4-1.6  - Here 1.48 at baseline   - Monitor BMP QD, avoid nephrotoxic drugs CKDIII GFR:30; baseline Cr: 1.4-1.6  - Here 1.48 at baseline   - Monitor BMP QD, avoid nephrotoxic drugs  -nephro on board; recs appreciated

## 2021-03-12 NOTE — PROGRESS NOTE ADULT - SUBJECTIVE AND OBJECTIVE BOX
Overnight events noted      VITAL:  T(C): , Max: 36.7 (21 @ 12:05)  T(F): , Max: 98 (21 @ 12:05)  HR: 78 (21 @ 12:05)  BP: 148/71 (21 @ 12:05)  BP(mean): --  RR: 17 (21 @ 12:05)  SpO2: 93% (21 @ 12:05)  urine output 4600cc/24h      PHYSICAL EXAM:  Constitutional: NAD, Alert; obese  HEENT: NCAT, MMM  Neck: Supple, No JVD  Respiratory: CTA-b/l  Cardiovascular: RRR s1s2, no m/r/g  Gastrointestinal: BS+, soft, NT/ND  Extremities: 1-2+ b/l LE edema  Neurological: no focal deficits; strength grossly intact  Back: no CVAT b/l  Skin: No rashes, no nevi      LABS:                        9.7    7.30  )-----------( 287      ( 12 Mar 2021 09:21 )             33.7     Na(140)/K(3.6)/Cl(92)/HCO3(36)/BUN(25)/Cr(1.51)Glu(259)/Ca(9.2)/Mg(1.8)/PO4(2.7)     @ 09:21  Na(142)/K(3.5)/Cl(100)/HCO3(29)/BUN(28)/Cr(1.74)Glu(196)/Ca(9.2)/Mg(1.9)/PO4(--)     @ 05:22  Na(140)/K(3.7)/Cl(101)/HCO3(29)/BUN(29)/Cr(1.48)Glu(158)/Ca(9.8)/Mg(--)/PO4(--)    03-10 @ 18:09    Urinalysis Basic - ( 10 Mar 2021 19:10 )  Color: Light Yellow / Appearance: Clear / S.015 / pH: x  Gluc: x / Ketone: Negative  / Bili: Negative / Urobili: Negative   Blood: x / Protein: 30 mg/dL / Nitrite: Negative   Leuk Esterase: Moderate / RBC: 0 /hpf / WBC 0 /HPF   Sq Epi: x / Non Sq Epi: 1 /hpf / Bacteria: Negative        IMPRESSION: 81F w/ HTN, DM2, CAD-CABG, HFpEF, breast CA, rectal CA, and CKD3-4, 3/10/21 a/w acute on chronic HFpEF      (1)Renal - CKD 3-4; minimal proteinuria - largely due to hypertension; potentially a component as well from diabetic nephropathy.     (2)Hypokalemia - diuretic-associated     (3)CV - acute on chronic HFpEF - superb urine otuput, on Lasix 40mg iv bid      RECOMMEND:  (1)IV Lasix as ordered  (2)K+ repletion - 20meq po x 2  (3)BMP daily  (4)Dose new meds for GFR 30ml/min            Ignacio Morataya MD  [facility]  Office: [phone]  Cell: [phone]       complains of erythema of left leg      VITAL:  T(C): , Max: 36.7 (21 @ 12:05)  T(F): , Max: 98 (21 @ 12:05)  HR: 78 (21 @ 12:05)  BP: 148/71 (21 @ 12:05)  BP(mean): --  RR: 17 (21 @ 12:05)  SpO2: 93% (21 @ 12:05)  urine output 4600cc/24h      PHYSICAL EXAM:  Constitutional: NAD, Alert; obese  HEENT: NCAT, MMM  Neck: Supple, No JVD  Respiratory: CTA-b/l  Cardiovascular: RRR s1s2, no m/r/g  Gastrointestinal: BS+, soft, NT/ND  Extremities: 1-2+ b/l LE edema  Neurological: no focal deficits; strength grossly intact  Back: no CVAT b/l  Skin: (+)confluent erythema left leg      LABS:                        9.7    7.30  )-----------( 287      ( 12 Mar 2021 09:21 )             33.7     Na(140)/K(3.6)/Cl(92)/HCO3(36)/BUN(25)/Cr(1.51)Glu(259)/Ca(9.2)/Mg(1.8)/PO4(2.7)     @ 09:21  Na(142)/K(3.5)/Cl(100)/HCO3(29)/BUN(28)/Cr(1.74)Glu(196)/Ca(9.2)/Mg(1.9)/PO4(--)     @ 05:22  Na(140)/K(3.7)/Cl(101)/HCO3(29)/BUN(29)/Cr(1.48)Glu(158)/Ca(9.8)/Mg(--)/PO4(--)    03-10 @ 18:09    Urinalysis Basic - ( 10 Mar 2021 19:10 )  Color: Light Yellow / Appearance: Clear / S.015 / pH: x  Gluc: x / Ketone: Negative  / Bili: Negative / Urobili: Negative   Blood: x / Protein: 30 mg/dL / Nitrite: Negative   Leuk Esterase: Moderate / RBC: 0 /hpf / WBC 0 /HPF   Sq Epi: x / Non Sq Epi: 1 /hpf / Bacteria: Negative        IMPRESSION: 81F w/ HTN, DM2, CAD-CABG, HFpEF, breast CA, rectal CA, and CKD3-4, 3/10/21 a/w acute on chronic HFpEF      (1)Renal - CKD 3-4; minimal proteinuria - largely due to hypertension; potentially a component as well from diabetic nephropathy.     (2)Hypokalemia - diuretic-associated     (3)CV - acute on chronic HFpEF - superb urine otuput, on Lasix 40mg iv bid    (4)ID - LLE erythema - impending cellulitis?    RECOMMEND:  (1)IV Lasix as ordered  (2)K+ repletion - 20meq po x 2  (3)BMP daily  (4)Monitoring of LLE for worsening of erythema; if to require abx, would dose for GFR 30ml/min          Ignacio Morataya MD  [facility]  Office: [phone]  Cell: [phone]

## 2021-03-12 NOTE — PROGRESS NOTE ADULT - PROBLEM SELECTOR PLAN 3
Previous hx of Right DVT 2019 on eliquis though patient was supposed to increase to 5mg bid only on 2.5mg bid  - At this time LLE>R in diameter, more tender, and erythematous  - Duplex US B/L given possible DVT; Pt not tachycardic nor any longer tachypneic on 3LNC; low utility of D-Dimer given CKD Previous hx of Right DVT 2019 on eliquis though patient was supposed to increase to 5mg bid only on 2.5mg bid  - At this time LLE>R in diameter, more tender, and erythematous  - Duplex US B/L given possible DVT; Pt not tachycardic nor any longer tachypneic on 3LNC; low utility of D-Dimer given CKD  -negative dvt study

## 2021-03-13 LAB
ANION GAP SERPL CALC-SCNC: 10 MMOL/L — SIGNIFICANT CHANGE UP (ref 5–17)
BUN SERPL-MCNC: 31 MG/DL — HIGH (ref 7–23)
CALCIUM SERPL-MCNC: 9.6 MG/DL — SIGNIFICANT CHANGE UP (ref 8.4–10.5)
CHLORIDE SERPL-SCNC: 94 MMOL/L — LOW (ref 96–108)
CO2 SERPL-SCNC: 34 MMOL/L — HIGH (ref 22–31)
CREAT SERPL-MCNC: 1.57 MG/DL — HIGH (ref 0.5–1.3)
GLUCOSE BLDC GLUCOMTR-MCNC: 197 MG/DL — HIGH (ref 70–99)
GLUCOSE BLDC GLUCOMTR-MCNC: 223 MG/DL — HIGH (ref 70–99)
GLUCOSE BLDC GLUCOMTR-MCNC: 282 MG/DL — HIGH (ref 70–99)
GLUCOSE SERPL-MCNC: 180 MG/DL — HIGH (ref 70–99)
HCT VFR BLD CALC: 35 % — SIGNIFICANT CHANGE UP (ref 34.5–45)
HGB BLD-MCNC: 10.1 G/DL — LOW (ref 11.5–15.5)
MAGNESIUM SERPL-MCNC: 1.9 MG/DL — SIGNIFICANT CHANGE UP (ref 1.6–2.6)
MCHC RBC-ENTMCNC: 24.6 PG — LOW (ref 27–34)
MCHC RBC-ENTMCNC: 28.9 GM/DL — LOW (ref 32–36)
MCV RBC AUTO: 85.2 FL — SIGNIFICANT CHANGE UP (ref 80–100)
NRBC # BLD: 0 /100 WBCS — SIGNIFICANT CHANGE UP (ref 0–0)
PHOSPHATE SERPL-MCNC: 2.8 MG/DL — SIGNIFICANT CHANGE UP (ref 2.5–4.5)
PLATELET # BLD AUTO: 300 K/UL — SIGNIFICANT CHANGE UP (ref 150–400)
POTASSIUM SERPL-MCNC: 4.1 MMOL/L — SIGNIFICANT CHANGE UP (ref 3.5–5.3)
POTASSIUM SERPL-SCNC: 4.1 MMOL/L — SIGNIFICANT CHANGE UP (ref 3.5–5.3)
RBC # BLD: 4.11 M/UL — SIGNIFICANT CHANGE UP (ref 3.8–5.2)
RBC # FLD: 17.9 % — HIGH (ref 10.3–14.5)
SODIUM SERPL-SCNC: 138 MMOL/L — SIGNIFICANT CHANGE UP (ref 135–145)
WBC # BLD: 8.5 K/UL — SIGNIFICANT CHANGE UP (ref 3.8–10.5)
WBC # FLD AUTO: 8.5 K/UL — SIGNIFICANT CHANGE UP (ref 3.8–10.5)

## 2021-03-13 PROCEDURE — 99233 SBSQ HOSP IP/OBS HIGH 50: CPT | Mod: GC

## 2021-03-13 RX ADMIN — Medication 150 MILLIGRAM(S): at 05:09

## 2021-03-13 RX ADMIN — POLYETHYLENE GLYCOL 3350 17 GRAM(S): 17 POWDER, FOR SOLUTION ORAL at 15:05

## 2021-03-13 RX ADMIN — Medication 6: at 11:40

## 2021-03-13 RX ADMIN — SENNA PLUS 1 TABLET(S): 8.6 TABLET ORAL at 21:20

## 2021-03-13 RX ADMIN — SIMVASTATIN 40 MILLIGRAM(S): 20 TABLET, FILM COATED ORAL at 21:19

## 2021-03-13 RX ADMIN — GABAPENTIN 300 MILLIGRAM(S): 400 CAPSULE ORAL at 15:05

## 2021-03-13 RX ADMIN — FAMOTIDINE 40 MILLIGRAM(S): 10 INJECTION INTRAVENOUS at 21:19

## 2021-03-13 RX ADMIN — OXYCODONE AND ACETAMINOPHEN 1 TABLET(S): 5; 325 TABLET ORAL at 12:26

## 2021-03-13 RX ADMIN — Medication 40 MILLIGRAM(S): at 05:09

## 2021-03-13 RX ADMIN — GABAPENTIN 300 MILLIGRAM(S): 400 CAPSULE ORAL at 21:20

## 2021-03-13 RX ADMIN — APIXABAN 5 MILLIGRAM(S): 2.5 TABLET, FILM COATED ORAL at 05:09

## 2021-03-13 RX ADMIN — Medication 2: at 21:53

## 2021-03-13 RX ADMIN — Medication 2: at 08:26

## 2021-03-13 RX ADMIN — Medication 650 MILLIGRAM(S): at 21:56

## 2021-03-13 RX ADMIN — OXYCODONE AND ACETAMINOPHEN 1 TABLET(S): 5; 325 TABLET ORAL at 12:56

## 2021-03-13 RX ADMIN — Medication 4: at 17:17

## 2021-03-13 RX ADMIN — Medication 40 MILLIGRAM(S): at 17:17

## 2021-03-13 RX ADMIN — SPIRONOLACTONE 25 MILLIGRAM(S): 25 TABLET, FILM COATED ORAL at 05:09

## 2021-03-13 RX ADMIN — APIXABAN 5 MILLIGRAM(S): 2.5 TABLET, FILM COATED ORAL at 17:17

## 2021-03-13 RX ADMIN — GABAPENTIN 300 MILLIGRAM(S): 400 CAPSULE ORAL at 05:10

## 2021-03-13 RX ADMIN — Medication 650 MILLIGRAM(S): at 21:21

## 2021-03-13 NOTE — PROGRESS NOTE ADULT - SUBJECTIVE AND OBJECTIVE BOX
Patient seen and examined at bedside.    Overnight Events: NAEO. This AM, pt reports she is coughing. Continues to appear VOL on exam.    Review Of Systems: REVIEW OF SYSTEMS:  CONSTITUTIONAL: No weakness, fevers or chills  EYES/ENT: No visual changes;  No dysphagia  NECK: No pain or stiffness  RESPIRATORY: +Cough  CARDIOVASCULAR: No chest pain or palpitations; No lower extremity edema  GASTROINTESTINAL: No abdominal or epigastric pain. No nausea, vomiting, or hematemesis; No diarrhea or constipation. No melena or hematochezia.  BACK: No back pain  GENITOURINARY: No dysuria, frequency or hematuria  NEUROLOGICAL: No numbness or weakness  SKIN: No itching, burning, rashes, or lesions   All other review of systems is negative unless indicated above.    Current Meds:  acetaminophen   Tablet .. 650 milliGRAM(s) Oral every 6 hours PRN  albuterol/ipratropium for Nebulization 3 milliLiter(s) Nebulizer every 6 hours PRN  apixaban 5 milliGRAM(s) Oral two times a day  dextrose 40% Gel 15 Gram(s) Oral once  dextrose 5%. 1000 milliLiter(s) IV Continuous <Continuous>  dextrose 5%. 1000 milliLiter(s) IV Continuous <Continuous>  dextrose 50% Injectable 25 Gram(s) IV Push once  dextrose 50% Injectable 12.5 Gram(s) IV Push once  dextrose 50% Injectable 25 Gram(s) IV Push once  famotidine    Tablet 40 milliGRAM(s) Oral at bedtime  furosemide   Injectable 40 milliGRAM(s) IV Push two times a day  gabapentin 300 milliGRAM(s) Oral three times a day  glucagon  Injectable 1 milliGRAM(s) IntraMuscular once  insulin lispro (ADMELOG) corrective regimen sliding scale   SubCutaneous three times a day before meals  insulin lispro (ADMELOG) corrective regimen sliding scale   SubCutaneous at bedtime  metoprolol succinate  milliGRAM(s) Oral daily  oxycodone    5 mG/acetaminophen 325 mG 1 Tablet(s) Oral every 6 hours PRN  polyethylene glycol 3350 17 Gram(s) Oral daily  senna 1 Tablet(s) Oral daily  simvastatin 40 milliGRAM(s) Oral at bedtime  spironolactone 25 milliGRAM(s) Oral daily      Vitals:  T(F): 98.5 (03-13), Max: 98.5 (03-13)  HR: 90 (03-13) (73 - 90)  BP: 138/67 (03-13) (138/67 - 150/62)  RR: 18 (03-13)  SpO2: 93% (03-13)  I&O's Summary    12 Mar 2021 07:01  -  13 Mar 2021 07:00  --------------------------------------------------------  IN: 1298 mL / OUT: 3300 mL / NET: -2002 mL        Physical Exam:  Gen: NAD. Serbian speaking.  HEENT: NCAT. PERRLA b/l.  Neck: JVP remains grossly elev.  CV: Normal S1, S2. RRR. No MRG.  Chest: Diffuse wheezing.  Abd: +BSx4. Soft. NTND.  Ext: 1+ b/l LE edema.  Skin: No cyanosis.  : No Dowling  Neuro: Nonfocal.                          10.1   8.50  )-----------( 300      ( 13 Mar 2021 05:24 )             35.0     03-13    138  |  94<L>  |  31<H>  ----------------------------<  180<H>  4.1   |  34<H>  |  1.57<H>    Ca    9.6      13 Mar 2021 05:24  Phos  2.8     03-13  Mg     1.9     03-13    TPro  6.8  /  Alb  3.6  /  TBili  0.3  /  DBili  x   /  AST  16  /  ALT  10  /  AlkPhos  105  03-12      CARDIAC MARKERS ( 10 Mar 2021 22:45 )  27 ng/L / x     / x     / x     / x     / x      CARDIAC MARKERS ( 10 Mar 2021 18:09 )  26 ng/L / x     / x     / x     / x     / x          Serum Pro-Brain Natriuretic Peptide: 2810 pg/mL (03-10 @ 18:09)    Interpretation of Telemetry: NSR. PAT.   Patient seen and examined at bedside.    Overnight Events: NAEO. This AM, pt reports she is coughing. Continues to appear VOL on exam.    REVIEW OF SYSTEMS:  CONSTITUTIONAL: No weakness, fevers or chills  EYES/ENT: No visual changes;  No dysphagia  NECK: No pain or stiffness  RESPIRATORY: +Cough  CARDIOVASCULAR: No chest pain or palpitations; No lower extremity edema  GASTROINTESTINAL: No abdominal or epigastric pain. No nausea, vomiting, or hematemesis; No diarrhea or constipation. No melena or hematochezia.  BACK: No back pain  GENITOURINARY: No dysuria, frequency or hematuria  NEUROLOGICAL: No numbness or weakness  SKIN: No itching, burning, rashes, or lesions   All other review of systems is negative unless indicated above.    Current Meds:  acetaminophen   Tablet .. 650 milliGRAM(s) Oral every 6 hours PRN  albuterol/ipratropium for Nebulization 3 milliLiter(s) Nebulizer every 6 hours PRN  apixaban 5 milliGRAM(s) Oral two times a day  dextrose 40% Gel 15 Gram(s) Oral once  dextrose 5%. 1000 milliLiter(s) IV Continuous <Continuous>  dextrose 5%. 1000 milliLiter(s) IV Continuous <Continuous>  dextrose 50% Injectable 25 Gram(s) IV Push once  dextrose 50% Injectable 12.5 Gram(s) IV Push once  dextrose 50% Injectable 25 Gram(s) IV Push once  famotidine    Tablet 40 milliGRAM(s) Oral at bedtime  furosemide   Injectable 40 milliGRAM(s) IV Push two times a day  gabapentin 300 milliGRAM(s) Oral three times a day  glucagon  Injectable 1 milliGRAM(s) IntraMuscular once  insulin lispro (ADMELOG) corrective regimen sliding scale   SubCutaneous three times a day before meals  insulin lispro (ADMELOG) corrective regimen sliding scale   SubCutaneous at bedtime  metoprolol succinate  milliGRAM(s) Oral daily  oxycodone    5 mG/acetaminophen 325 mG 1 Tablet(s) Oral every 6 hours PRN  polyethylene glycol 3350 17 Gram(s) Oral daily  senna 1 Tablet(s) Oral daily  simvastatin 40 milliGRAM(s) Oral at bedtime  spironolactone 25 milliGRAM(s) Oral daily      Vitals:  T(F): 98.5 (03-13), Max: 98.5 (03-13)  HR: 90 (03-13) (73 - 90)  BP: 138/67 (03-13) (138/67 - 150/62)  RR: 18 (03-13)  SpO2: 93% (03-13)  I&O's Summary    12 Mar 2021 07:01  -  13 Mar 2021 07:00  --------------------------------------------------------  IN: 1298 mL / OUT: 3300 mL / NET: -2002 mL    Physical Exam:  Gen: NAD. Slovak speaking.  HEENT: NCAT. PERRLA b/l.  Neck: JVP remains grossly elev.  CV: Normal S1, S2. RRR. No MRG.  Chest: Diffuse wheezing.  Abd: +BSx4. Soft. NTND.  Ext: 1+ b/l LE edema.  Skin: No cyanosis.  : No Dowling  Neuro: Nonfocal.                          10.1   8.50  )-----------( 300      ( 13 Mar 2021 05:24 )             35.0     03-13    138  |  94<L>  |  31<H>  ----------------------------<  180<H>  4.1   |  34<H>  |  1.57<H>    Ca    9.6      13 Mar 2021 05:24  Phos  2.8     03-13  Mg     1.9     03-13    TPro  6.8  /  Alb  3.6  /  TBili  0.3  /  DBili  x   /  AST  16  /  ALT  10  /  AlkPhos  105  03-12      CARDIAC MARKERS ( 10 Mar 2021 22:45 )  27 ng/L / x     / x     / x     / x     / x      CARDIAC MARKERS ( 10 Mar 2021 18:09 )  26 ng/L / x     / x     / x     / x     / x          Serum Pro-Brain Natriuretic Peptide: 2810 pg/mL (03-10 @ 18:09)    Interpretation of Telemetry: NSR. PAT.    Echo: < from: Transthoracic Echocardiogram (03.11.21 @ 13:15) >  Dimensions:    Normal Values:  LA:     4.1    2.0 - 4.0 cm  Ao:     3.0    2.0 - 3.8 cm  SEPTUM: 0.7    0.6 - 1.2 cm  PWT:    0.9    0.6 -1.1 cm  LVIDd:  5.1    3.0 - 5.6 cm  LVIDs:  3.6    1.8 - 4.0 cm  Derived variables:  LVMI: 71 g/m2  RWT: 0.35  Fractional short: 29 %  EF (Visual Estimate): 50 %  ------------------------------------------------------------------------  Observations:  Mitral Valve: Mitral annular calcification, otherwise  normal mitral valve. Mild mitral regurgitation.  Aortic Valve/Aorta: Normal trileaflet aortic valve. Peak  left ventricular outflow tract gradient equals 1 mm Hg,  LVOT velocity time integral equals 15 cm.  Aortic Root: 3 cm.  Left Atrium: Normal left atrium.  LA volume index = 34  cc/m2.  Left Ventricle: Endocardium not well visualized; grossly  normal left ventricular systolic function. Normal left  ventricular internal dimensions and wall thicknesses.  Moderate diastolic dysfunction (Stage II).  Right Heart: Moderate right atrial enlargement. Right  ventricular enlargement with normal right ventricular  systolic function (TAPSE 2.2 cm). Normal tricuspid valve.  Mild-moderate tricuspid regurgitation. Normal pulmonic  valve.  Pericardium/Pleura: Normal pericardium with no pericardial  effusion.  Hemodynamic: Estimated right ventricular systolic pressure  equals 65 mm Hg, assuming right atrial pressure equals 10  mm Hg, consistent with severe pulmonary hypertension.  ------------------------------------------------------------------------  Conclusions:  1. Normal left ventricular internal dimensions and wall  thicknesses.  2. Endocardium not well visualized; grossly normal left  ventricular systolic function.  3. Moderate right atrial enlargement.  4. Right ventricular enlargement with normal right  ventricular systolic function (TAPSE 2.2 cm).  5. Estimated pulmonary artery systolic pressure equals 65  mm Hg, assuming rightatrial pressure equals 10 mm Hg,  consistent with severe pulmonary pressures.    < end of copied text >

## 2021-03-13 NOTE — PROGRESS NOTE ADULT - PROBLEM SELECTOR PLAN 5
CKDIII GFR:30; baseline Cr: 1.4-1.6  - Here 1.48 at baseline   - Monitor BMP QD, avoid nephrotoxic drugs  -nephro on board; recs appreciated

## 2021-03-13 NOTE — PROGRESS NOTE ADULT - SUBJECTIVE AND OBJECTIVE BOX
Nephrology Progress Note    Patient is a 81y female    Allergies:  CT scan dye (Hives)  penicillin (Unknown)    Hospital Medications:   MEDICATIONS  (STANDING):  apixaban 5 milliGRAM(s) Oral two times a day  dextrose 40% Gel 15 Gram(s) Oral once  dextrose 5%. 1000 milliLiter(s) (50 mL/Hr) IV Continuous <Continuous>  dextrose 5%. 1000 milliLiter(s) (100 mL/Hr) IV Continuous <Continuous>  dextrose 50% Injectable 25 Gram(s) IV Push once  dextrose 50% Injectable 12.5 Gram(s) IV Push once  dextrose 50% Injectable 25 Gram(s) IV Push once  famotidine    Tablet 40 milliGRAM(s) Oral at bedtime  furosemide   Injectable 40 milliGRAM(s) IV Push two times a day  gabapentin 300 milliGRAM(s) Oral three times a day  glucagon  Injectable 1 milliGRAM(s) IntraMuscular once  insulin lispro (ADMELOG) corrective regimen sliding scale   SubCutaneous three times a day before meals  insulin lispro (ADMELOG) corrective regimen sliding scale   SubCutaneous at bedtime  metoprolol succinate  milliGRAM(s) Oral daily  polyethylene glycol 3350 17 Gram(s) Oral daily  senna 1 Tablet(s) Oral daily  simvastatin 40 milliGRAM(s) Oral at bedtime  spironolactone 25 milliGRAM(s) Oral daily      VITALS:  T(F): 98.1 (21 @ 11:59), Max: 98.5 (21 @ 01:35)  HR: 90 (21 @ 13:17)  BP: 120/79 (21 @ 11:59)  RR: 18 (21 @ 11:59)  SpO2: 100% (21 @ 11:59)       @ 07:01  -   @ 07:00  --------------------------------------------------------  IN: 585 mL / OUT: 4600 mL / NET: -4015 mL     07:01  -   @ 07:00  --------------------------------------------------------  IN: 1298 mL / OUT: 3300 mL / NET: - mL     @ 06:01  -   @ 16:57  --------------------------------------------------------  IN: 380 mL / OUT: 0 mL / NET: 380 mL        PHYSICAL EXAM:  Constitutional: NAD, Alert; obese  HEENT: NCAT, MMM  Neck: Supple, No JVD  Respiratory: CTA-b/l  Cardiovascular: RRR s1s2, no m/r/g  Gastrointestinal: BS+, soft, NT/ND  Extremities: 1-2+ b/l LE edema  Neurological: no focal deficits; strength grossly intact  Back: no CVAT b/l  Skin: left leg erythema    LABS:      138  |  94<L>  |  31<H>  ----------------------------<  180<H>  4.1   |  34<H>  |  1.57<H>    Ca    9.6      13 Mar 2021 05:24  Phos  2.8       Mg     1.9         TPro  6.8  /  Alb  3.6  /  TBili  0.3  /  DBili      /  AST  16  /  ALT  10  /  AlkPhos  105  12                          10.1   8.50  )-----------( 300      ( 13 Mar 2021 05:24 )             35.0       Urine Studies:  Urinalysis Basic - ( 10 Mar 2021 19:10 )    Color: Light Yellow / Appearance: Clear / S.015 / pH:   Gluc:  / Ketone: Negative  / Bili: Negative / Urobili: Negative   Blood:  / Protein: 30 mg/dL / Nitrite: Negative   Leuk Esterase: Moderate / RBC: 0 /hpf / WBC 0 /HPF   Sq Epi:  / Non Sq Epi: 1 /hpf / Bacteria: Negative        RADIOLOGY & ADDITIONAL STUDIES:  Chest x-ray: 3/10/21    Sternotomy with CABG.    Bilateral perihilar linear opacities with indistinctness of the hilar vasculature. No pleural effusion.    The heart is enlarged.    Degenerative changes of the spine.    IMPRESSION:    Pulmonary edema.   Nephrology Progress Note    Patient is a 81y female in bed comfortable    Allergies:  CT scan dye (Hives)  penicillin (Unknown)    Hospital Medications:   MEDICATIONS  (STANDING):  apixaban 5 milliGRAM(s) Oral two times a day  dextrose 40% Gel 15 Gram(s) Oral once  dextrose 5%. 1000 milliLiter(s) (50 mL/Hr) IV Continuous <Continuous>  dextrose 5%. 1000 milliLiter(s) (100 mL/Hr) IV Continuous <Continuous>  dextrose 50% Injectable 25 Gram(s) IV Push once  dextrose 50% Injectable 12.5 Gram(s) IV Push once  dextrose 50% Injectable 25 Gram(s) IV Push once  famotidine    Tablet 40 milliGRAM(s) Oral at bedtime  furosemide   Injectable 40 milliGRAM(s) IV Push two times a day  gabapentin 300 milliGRAM(s) Oral three times a day  glucagon  Injectable 1 milliGRAM(s) IntraMuscular once  insulin lispro (ADMELOG) corrective regimen sliding scale   SubCutaneous three times a day before meals  insulin lispro (ADMELOG) corrective regimen sliding scale   SubCutaneous at bedtime  metoprolol succinate  milliGRAM(s) Oral daily  polyethylene glycol 3350 17 Gram(s) Oral daily  senna 1 Tablet(s) Oral daily  simvastatin 40 milliGRAM(s) Oral at bedtime  spironolactone 25 milliGRAM(s) Oral daily      VITALS:  T(F): 98.1 (21 @ 11:59), Max: 98.5 (21 @ 01:35)  HR: 90 (21 @ 13:17)  BP: 120/79 (21 @ 11:59)  RR: 18 (21 @ 11:59)  SpO2: 100% (21 @ 11:59)       @ 07:01  -   @ 07:00  --------------------------------------------------------  IN: 585 mL / OUT: 4600 mL / NET: -4015 mL     @ 07:01  -   @ 07:00  --------------------------------------------------------  IN: 1298 mL / OUT: 3300 mL / NET: - mL     @ 06:01  -   @ 16:57  --------------------------------------------------------  IN: 380 mL / OUT: 0 mL / NET: 380 mL        PHYSICAL EXAM:  Constitutional: NAD, Alert; obese  HEENT: NCAT, MMM  Neck: Supple, No JVD  Respiratory: CTA-b/l  Cardiovascular: RRR s1s2, no m/r/g  Gastrointestinal: BS+, soft, NT/ND  Extremities: 1-2+ b/l LE edema  Neurological: no focal deficits; strength grossly intact  Back: no CVAT b/l  Skin: bilateral leg erythema    LABS:      138  |  94<L>  |  31<H>  ----------------------------<  180<H>  4.1   |  34<H>  |  1.57<H>    Ca    9.6      13 Mar 2021 05:24  Phos  2.8       Mg     1.9         TPro  6.8  /  Alb  3.6  /  TBili  0.3  /  DBili      /  AST  16  /  ALT  10  /  AlkPhos  105                            10.1   8.50  )-----------( 300      ( 13 Mar 2021 05:24 )             35.0       Urine Studies:  Urinalysis Basic - ( 10 Mar 2021 19:10 )    Color: Light Yellow / Appearance: Clear / S.015 / pH:   Gluc:  / Ketone: Negative  / Bili: Negative / Urobili: Negative   Blood:  / Protein: 30 mg/dL / Nitrite: Negative   Leuk Esterase: Moderate / RBC: 0 /hpf / WBC 0 /HPF   Sq Epi:  / Non Sq Epi: 1 /hpf / Bacteria: Negative        RADIOLOGY & ADDITIONAL STUDIES:  Chest x-ray: 3/10/21    Sternotomy with CABG.    Bilateral perihilar linear opacities with indistinctness of the hilar vasculature. No pleural effusion.    The heart is enlarged.    Degenerative changes of the spine.    IMPRESSION:    Pulmonary edema.

## 2021-03-13 NOTE — PROGRESS NOTE ADULT - PROBLEM SELECTOR PLAN 2
At this time LLE>R in diameter, more tender, and erythematous, shiny with good pulses   - LE doppler neg for DVT   - monitor for cellulitis   - CHF exacerbation management as above

## 2021-03-13 NOTE — PROGRESS NOTE ADULT - SUBJECTIVE AND OBJECTIVE BOX
***************************************************************  Dr. Ирина Duffy  Internal Medicine   [facility] Pager: [phone]  [facility] Pager: [phone]   ***************************************************************    NURIA GARCIA  81y  MRN: [id number]    Subjective:    Patient is a 81y old  Female who presents with a chief complaint of CHF Exacerbation (12 Mar 2021 15:05)      Interval history/overnight events:    KEN ON.     Tele: 10sec of PATs      MEDICATIONS  (STANDING):  apixaban 5 milliGRAM(s) Oral two times a day  dextrose 40% Gel 15 Gram(s) Oral once  dextrose 5%. 1000 milliLiter(s) (50 mL/Hr) IV Continuous <Continuous>  dextrose 5%. 1000 milliLiter(s) (100 mL/Hr) IV Continuous <Continuous>  dextrose 50% Injectable 25 Gram(s) IV Push once  dextrose 50% Injectable 12.5 Gram(s) IV Push once  dextrose 50% Injectable 25 Gram(s) IV Push once  famotidine    Tablet 40 milliGRAM(s) Oral at bedtime  furosemide   Injectable 40 milliGRAM(s) IV Push two times a day  gabapentin 300 milliGRAM(s) Oral three times a day  glucagon  Injectable 1 milliGRAM(s) IntraMuscular once  insulin lispro (ADMELOG) corrective regimen sliding scale   SubCutaneous three times a day before meals  insulin lispro (ADMELOG) corrective regimen sliding scale   SubCutaneous at bedtime  metoprolol succinate  milliGRAM(s) Oral daily  polyethylene glycol 3350 17 Gram(s) Oral daily  senna 1 Tablet(s) Oral daily  simvastatin 40 milliGRAM(s) Oral at bedtime  spironolactone 25 milliGRAM(s) Oral daily    MEDICATIONS  (PRN):  acetaminophen   Tablet .. 650 milliGRAM(s) Oral every 6 hours PRN Mild Pain (1 - 3)  albuterol/ipratropium for Nebulization 3 milliLiter(s) Nebulizer every 6 hours PRN Shortness of Breath and/or Wheezing  oxycodone    5 mG/acetaminophen 325 mG 1 Tablet(s) Oral every 6 hours PRN Moderate Pain (4 - 6)        Objective:    Vitals: Vital Signs Last 24 Hrs  T(C): 36.9 (03-13-21 @ 04:49), Max: 36.9 (03-13-21 @ 01:35)  T(F): 98.5 (03-13-21 @ 04:49), Max: 98.5 (03-13-21 @ 01:35)  HR: 90 (03-13-21 @ 04:49) (73 - 90)  BP: 138/67 (03-13-21 @ 04:49) (138/67 - 150/62)  BP(mean): --  RR: 18 (03-13-21 @ 04:49) (17 - 19)  SpO2: 93% (03-13-21 @ 04:49) (92% - 98%)            I&O's Summary    11 Mar 2021 07:01  -  12 Mar 2021 07:00  --------------------------------------------------------  IN: 585 mL / OUT: 4600 mL / NET: -4015 mL    12 Mar 2021 07:01  -  13 Mar 2021 06:57  --------------------------------------------------------  IN: 1298 mL / OUT: 3300 mL / NET: -2002 mL        PHYSICAL EXAM:  GENERAL: NAD  HEENT: PERRL, no scleral icterus, no head and neck lad   CHEST/LUNG: CTAB, no wheezing, crackles, or ronchi   HEART: RRR, normal S1, S2, no murmurs, gallops, or rubs appreciated   ABDOMEN: soft, nondistended, non-tender, normoactive, no HSM, no rebound, no guarding, no rigidity  SKIN: No rashes or lesions  NERVOUS SYSTEM: Alert & Oriented X3  PSYCH: calm and cooperative     LABS:    03-13    138  |  94<L>  |  31<H>  ----------------------------<  180<H>  4.1   |  34<H>  |  1.57<H>  03-12    140  |  92<L>  |  25<H>  ----------------------------<  259<H>  3.6   |  36<H>  |  1.51<H>  03-11    142  |  100  |  28<H>  ----------------------------<  196<H>  3.5   |  29  |  1.74<H>    Ca    9.6      13 Mar 2021 05:24  Ca    9.2      12 Mar 2021 09:21  Ca    9.2      11 Mar 2021 05:22  Phos  2.8     03-13  Mg     1.9     03-13    TPro  6.8  /  Alb  3.6  /  TBili  0.3  /  DBili  x   /  AST  16  /  ALT  10  /  AlkPhos  105  03-12  TPro  6.9  /  Alb  3.5  /  TBili  0.2  /  DBili  x   /  AST  14  /  ALT  13  /  AlkPhos  111  03-11  TPro  7.6  /  Alb  3.8  /  TBili  0.3  /  DBili  x   /  AST  17  /  ALT  14  /  AlkPhos  121<H>  03-10                                            10.1   8.50  )-----------( 300      ( 13 Mar 2021 05:24 )             35.0                         9.7    7.30  )-----------( 287      ( 12 Mar 2021 09:21 )             33.7                         9.5    8.38  )-----------( 300      ( 11 Mar 2021 05:22 )             32.7     CAPILLARY BLOOD GLUCOSE      POCT Blood Glucose.: 175 mg/dL (12 Mar 2021 21:56)  POCT Blood Glucose.: 233 mg/dL (12 Mar 2021 16:45)  POCT Blood Glucose.: 176 mg/dL (12 Mar 2021 11:45)  POCT Blood Glucose.: 238 mg/dL (12 Mar 2021 07:54)          RADIOLOGY & ADDITIONAL TESTS:            Imaging Personally Reviewed:  [ ] YES  [ ] NO    Consultants involved in case:   Consultant(s) Notes Reviewed:  [ ] YES  [ ] NO:   Care Discussed with Consultants/Other Providers [ ] YES  [ ] NO         ***************************************************************  Dr. Ирина Duffy  Internal Medicine   [facility] Pager: [phone]  [facility] Pager: [phone]   ***************************************************************    NURIA GARCIA  81y  MRN: [id number]    Subjective:    Patient is a 81y old  Female who presents with a chief complaint of CHF Exacerbation (12 Mar 2021 15:05)      Interval history/overnight events:    KEN ON. ROS limited as patient Greek speaking, does not follow with an , speaks dialect that is difficult to translate. Minimally English speaking, denies chest pain or shortness of breath. Remains on 3L NC and hypervolemic on exam.     Tele: Sinus 70-90s, 10sec of PATs ON.       MEDICATIONS  (STANDING):  apixaban 5 milliGRAM(s) Oral two times a day  dextrose 40% Gel 15 Gram(s) Oral once  dextrose 5%. 1000 milliLiter(s) (50 mL/Hr) IV Continuous <Continuous>  dextrose 5%. 1000 milliLiter(s) (100 mL/Hr) IV Continuous <Continuous>  dextrose 50% Injectable 25 Gram(s) IV Push once  dextrose 50% Injectable 12.5 Gram(s) IV Push once  dextrose 50% Injectable 25 Gram(s) IV Push once  famotidine    Tablet 40 milliGRAM(s) Oral at bedtime  furosemide   Injectable 40 milliGRAM(s) IV Push two times a day  gabapentin 300 milliGRAM(s) Oral three times a day  glucagon  Injectable 1 milliGRAM(s) IntraMuscular once  insulin lispro (ADMELOG) corrective regimen sliding scale   SubCutaneous three times a day before meals  insulin lispro (ADMELOG) corrective regimen sliding scale   SubCutaneous at bedtime  metoprolol succinate  milliGRAM(s) Oral daily  polyethylene glycol 3350 17 Gram(s) Oral daily  senna 1 Tablet(s) Oral daily  simvastatin 40 milliGRAM(s) Oral at bedtime  spironolactone 25 milliGRAM(s) Oral daily    MEDICATIONS  (PRN):  acetaminophen   Tablet .. 650 milliGRAM(s) Oral every 6 hours PRN Mild Pain (1 - 3)  albuterol/ipratropium for Nebulization 3 milliLiter(s) Nebulizer every 6 hours PRN Shortness of Breath and/or Wheezing  oxycodone    5 mG/acetaminophen 325 mG 1 Tablet(s) Oral every 6 hours PRN Moderate Pain (4 - 6)        Objective:    Vitals: Vital Signs Last 24 Hrs  T(C): 36.9 (03-13-21 @ 04:49), Max: 36.9 (03-13-21 @ 01:35)  T(F): 98.5 (03-13-21 @ 04:49), Max: 98.5 (03-13-21 @ 01:35)  HR: 90 (03-13-21 @ 04:49) (73 - 90)  BP: 138/67 (03-13-21 @ 04:49) (138/67 - 150/62)  BP(mean): --  RR: 18 (03-13-21 @ 04:49) (17 - 19)  SpO2: 93% (03-13-21 @ 04:49) (92% - 98%)            I&O's Summary    11 Mar 2021 07:01  -  12 Mar 2021 07:00  --------------------------------------------------------  IN: 585 mL / OUT: 4600 mL / NET: -4015 mL    12 Mar 2021 07:01  -  13 Mar 2021 06:57  --------------------------------------------------------  IN: 1298 mL / OUT: 3300 mL / NET: -2002 mL    PHYSICAL EXAM:  GENERAL: elderly female in NAD on 3L NC   HEENT: PERRL, no scleral icterus, no head and neck lad   CHEST/LUNG: bb crackles, no wheeze or rhonchi   HEART: RRR, normal S1, S2, no murmurs, gallops, or rubs appreciated, +JVD at 12 cm, +HJR   ABDOMEN: soft, nondistended, non-tender, normoactive, no HSM, no rebound, no guarding, no rigidity  EXTREMITIES: RUE swelling s/p partial mastectomy, nonpitting edema b LE, L >R  SKIN: stasis dermatitis changes bl   NERVOUS SYSTEM: Alert & Oriented X3  PSYCH: calm and cooperative     LABS:    03-13    138  |  94<L>  |  31<H>  ----------------------------<  180<H>  4.1   |  34<H>  |  1.57<H>  03-12    140  |  92<L>  |  25<H>  ----------------------------<  259<H>  3.6   |  36<H>  |  1.51<H>  03-11    142  |  100  |  28<H>  ----------------------------<  196<H>  3.5   |  29  |  1.74<H>    Ca    9.6      13 Mar 2021 05:24  Ca    9.2      12 Mar 2021 09:21  Ca    9.2      11 Mar 2021 05:22  Phos  2.8     03-13  Mg     1.9     03-13    TPro  6.8  /  Alb  3.6  /  TBili  0.3  /  DBili  x   /  AST  16  /  ALT  10  /  AlkPhos  105  03-12  TPro  6.9  /  Alb  3.5  /  TBili  0.2  /  DBili  x   /  AST  14  /  ALT  13  /  AlkPhos  111  03-11  TPro  7.6  /  Alb  3.8  /  TBili  0.3  /  DBili  x   /  AST  17  /  ALT  14  /  AlkPhos  121<H>  03-10                                            10.1   8.50  )-----------( 300      ( 13 Mar 2021 05:24 )             35.0                         9.7    7.30  )-----------( 287      ( 12 Mar 2021 09:21 )             33.7                         9.5    8.38  )-----------( 300      ( 11 Mar 2021 05:22 )             32.7     CAPILLARY BLOOD GLUCOSE      POCT Blood Glucose.: 175 mg/dL (12 Mar 2021 21:56)  POCT Blood Glucose.: 233 mg/dL (12 Mar 2021 16:45)  POCT Blood Glucose.: 176 mg/dL (12 Mar 2021 11:45)  POCT Blood Glucose.: 238 mg/dL (12 Mar 2021 07:54)          RADIOLOGY & ADDITIONAL TESTS:            Imaging Personally Reviewed:  [ ] YES  [ ] NO    Consultants involved in case:   Consultant(s) Notes Reviewed:  [ ] YES  [ ] NO:   Care Discussed with Consultants/Other Providers [ ] YES  [ ] NO

## 2021-03-13 NOTE — PROGRESS NOTE ADULT - PROBLEM SELECTOR PLAN 3
Previous hx of Right DVT 2019 on eliquis though patient was supposed to increase to 5mg bid only on 2.5mg bid  - At this time LLE>R in diameter, more tender, and erythematous  - negative dvt study  - c/w eliquis 5mg BID

## 2021-03-13 NOTE — PROGRESS NOTE ADULT - PROBLEM SELECTOR PLAN 1
Known Stage II Diastolic CHF (EF 60% 11/20/20) HJR- but Clinically Overloaded  -CXR showing pulmonary edema b/l; BNP 2810, EKG grossly normal, low voltage   - C/w 40mg IV lasix BID, c/w metoprolol 150mg qd  -Daily weights, fluid 1.5L restriction, salt restriction  -Echo 3/11 EF 50%, w/severe pulmHTN, but likely 2/2 severe volume overload  -Repeat echo when more euvolemic

## 2021-03-13 NOTE — PROGRESS NOTE ADULT - ASSESSMENT
1F w/ HTN, DM2, CAD-CABG, HFpEF, breast CA, rectal CA, and CKD3-4, 3/10/21 a/w acute on chronic HFpEF      (1)Renal - CKD 3-4; minimal proteinuria - largely due to hypertension; potentially a component as well from diabetic nephropathy.     (2)Hypokalemia - diuretic-associated, acceptable with repletion    (3)CV - acute on chronic HFpEF - diuresing well, on IV Lasix and spironolactone    (4)ID - LLE erythema - impending cellulitis? negative for DVT    RECOMMEND:  (1)Continue IV Lasix 40mg iv bid  (2)BMP daily  (3) Avoid nephrotoxins as able  (4)Monitoring of LLE for worsening of erythema; if to require abx, would dose for GFR 30ml/min        Samanta Prasad NP  [facility]  Office: [phone]

## 2021-03-13 NOTE — PROGRESS NOTE ADULT - ASSESSMENT
80F HTN, HLD, DM2, CAD s/p CABG, breast CA s/p chemo/mastectomy, rectal CA s/p XRT/resection, RLE DVT on apixaban, and CKD3 p/w ADHFpEF.    #ADHFpEF  #pHTN  -Remains VOL on exam  -C/w furosemide 40mg IV BID  -Strict Is/Os, daily wts  -Target net neg 1-2L/day  -Daily labs; maintain K>4, Mg>2  -C/w spirono 25mg daily  -C/w simva 40 and meto succ 150mg daily  -pHTN likely post-capillary in setting of VOL; repeat TTE when euvolemic to r/a pulm pressures    #DVT  -Would renally dose apixaban at 2.5mg BID    #Will follow    Herson Hale MD  Cardiology Fellow  All Cardiology service information can be found 24/7 on [email or web address], password: [email or web address]

## 2021-03-13 NOTE — PROGRESS NOTE ADULT - ASSESSMENT
81 y/o F hx/ HTN, HLD, DM2 not on insulin, CAD s/p 2-vessel CABG 11/9/2020, Stage II Diastolic CHF (EF 60% 11/20/20), Breast cancer s/p right partial mastectomy and chemo (1989), Rectal Ca s/p resection and radiation 2015, R calf DVT on eliquis, anemia, CKDIII (Cr: 1.59 baseline) recent [facility] admission 2/4-2/8 for CHF exacerbation p/w increasing weight/swelling, sob, and difficulty ambulating x2 weeks likely 2/2 CHF exacerbation with no known inciting event.

## 2021-03-13 NOTE — CHART NOTE - NSCHARTNOTEFT_GEN_A_CORE
79 yo F with PMH of HTN, HLD T2DM A1c 7.2, CAD s/p 2V CABG 11/2020 LIMA-LAD and SVG-RCA, breast CA s/p partial mastectomy and chemo in 1989, rectal CA s/p resection/RT in 2015, RLE DVT on Eliquis, and CKD3 p/w dHF exacerbation. Noted to have 's on monitor  x 10 secs. VSS. Pt symptomatic. Will continue to monitor 81 yo F with PMH of HTN, HLD T2DM A1c 7.2, CAD s/p 2V CABG 11/2020 LIMA-LAD and SVG-RCA, breast CA s/p partial mastectomy and chemo in 1989, rectal CA s/p resection/RT in 2015, RLE DVT on Eliquis, and CKD3 p/w dHF exacerbation. Noted to have 's on monitor  x 10 secs. VSS. Pt symptomatic. Will continue to monitor ERROR

## 2021-03-14 LAB
ANION GAP SERPL CALC-SCNC: 10 MMOL/L — SIGNIFICANT CHANGE UP (ref 5–17)
APTT BLD: 32.3 SEC — SIGNIFICANT CHANGE UP (ref 27.5–35.5)
BUN SERPL-MCNC: 37 MG/DL — HIGH (ref 7–23)
CALCIUM SERPL-MCNC: 9.6 MG/DL — SIGNIFICANT CHANGE UP (ref 8.4–10.5)
CHLORIDE SERPL-SCNC: 91 MMOL/L — LOW (ref 96–108)
CO2 SERPL-SCNC: 35 MMOL/L — HIGH (ref 22–31)
CREAT SERPL-MCNC: 1.71 MG/DL — HIGH (ref 0.5–1.3)
GLUCOSE BLDC GLUCOMTR-MCNC: 169 MG/DL — HIGH (ref 70–99)
GLUCOSE BLDC GLUCOMTR-MCNC: 171 MG/DL — HIGH (ref 70–99)
GLUCOSE BLDC GLUCOMTR-MCNC: 236 MG/DL — HIGH (ref 70–99)
GLUCOSE BLDC GLUCOMTR-MCNC: 240 MG/DL — HIGH (ref 70–99)
GLUCOSE SERPL-MCNC: 160 MG/DL — HIGH (ref 70–99)
HCT VFR BLD CALC: 34.7 % — SIGNIFICANT CHANGE UP (ref 34.5–45)
HGB BLD-MCNC: 9.8 G/DL — LOW (ref 11.5–15.5)
INR BLD: 1.43 RATIO — HIGH (ref 0.88–1.16)
MAGNESIUM SERPL-MCNC: 2.1 MG/DL — SIGNIFICANT CHANGE UP (ref 1.6–2.6)
MCHC RBC-ENTMCNC: 24.6 PG — LOW (ref 27–34)
MCHC RBC-ENTMCNC: 28.2 GM/DL — LOW (ref 32–36)
MCV RBC AUTO: 87.2 FL — SIGNIFICANT CHANGE UP (ref 80–100)
NRBC # BLD: 0 /100 WBCS — SIGNIFICANT CHANGE UP (ref 0–0)
PHOSPHATE SERPL-MCNC: 4.5 MG/DL — SIGNIFICANT CHANGE UP (ref 2.5–4.5)
PLATELET # BLD AUTO: 286 K/UL — SIGNIFICANT CHANGE UP (ref 150–400)
POTASSIUM SERPL-MCNC: 4.2 MMOL/L — SIGNIFICANT CHANGE UP (ref 3.5–5.3)
POTASSIUM SERPL-SCNC: 4.2 MMOL/L — SIGNIFICANT CHANGE UP (ref 3.5–5.3)
PROTHROM AB SERPL-ACNC: 16.6 SEC — HIGH (ref 10.6–13.6)
RBC # BLD: 3.98 M/UL — SIGNIFICANT CHANGE UP (ref 3.8–5.2)
RBC # FLD: 17.7 % — HIGH (ref 10.3–14.5)
SODIUM SERPL-SCNC: 136 MMOL/L — SIGNIFICANT CHANGE UP (ref 135–145)
WBC # BLD: 7.35 K/UL — SIGNIFICANT CHANGE UP (ref 3.8–10.5)
WBC # FLD AUTO: 7.35 K/UL — SIGNIFICANT CHANGE UP (ref 3.8–10.5)

## 2021-03-14 PROCEDURE — 99233 SBSQ HOSP IP/OBS HIGH 50: CPT | Mod: GC

## 2021-03-14 RX ORDER — APIXABAN 2.5 MG/1
2.5 TABLET, FILM COATED ORAL
Refills: 0 | Status: DISCONTINUED | OUTPATIENT
Start: 2021-03-14 | End: 2021-03-18

## 2021-03-14 RX ORDER — APIXABAN 2.5 MG/1
2.55 TABLET, FILM COATED ORAL
Refills: 0 | Status: DISCONTINUED | OUTPATIENT
Start: 2021-03-14 | End: 2021-03-14

## 2021-03-14 RX ADMIN — APIXABAN 2.5 MILLIGRAM(S): 2.5 TABLET, FILM COATED ORAL at 17:49

## 2021-03-14 RX ADMIN — Medication 650 MILLIGRAM(S): at 22:07

## 2021-03-14 RX ADMIN — Medication 650 MILLIGRAM(S): at 22:37

## 2021-03-14 RX ADMIN — APIXABAN 5 MILLIGRAM(S): 2.5 TABLET, FILM COATED ORAL at 05:18

## 2021-03-14 RX ADMIN — Medication 40 MILLIGRAM(S): at 05:18

## 2021-03-14 RX ADMIN — Medication 2: at 17:00

## 2021-03-14 RX ADMIN — Medication 650 MILLIGRAM(S): at 12:23

## 2021-03-14 RX ADMIN — SIMVASTATIN 40 MILLIGRAM(S): 20 TABLET, FILM COATED ORAL at 22:07

## 2021-03-14 RX ADMIN — Medication 2: at 08:12

## 2021-03-14 RX ADMIN — GABAPENTIN 300 MILLIGRAM(S): 400 CAPSULE ORAL at 14:15

## 2021-03-14 RX ADMIN — GABAPENTIN 300 MILLIGRAM(S): 400 CAPSULE ORAL at 22:07

## 2021-03-14 RX ADMIN — Medication 40 MILLIGRAM(S): at 17:47

## 2021-03-14 RX ADMIN — Medication 150 MILLIGRAM(S): at 05:19

## 2021-03-14 RX ADMIN — FAMOTIDINE 40 MILLIGRAM(S): 10 INJECTION INTRAVENOUS at 22:07

## 2021-03-14 RX ADMIN — SENNA PLUS 1 TABLET(S): 8.6 TABLET ORAL at 22:07

## 2021-03-14 RX ADMIN — SPIRONOLACTONE 25 MILLIGRAM(S): 25 TABLET, FILM COATED ORAL at 05:18

## 2021-03-14 RX ADMIN — POLYETHYLENE GLYCOL 3350 17 GRAM(S): 17 POWDER, FOR SOLUTION ORAL at 11:51

## 2021-03-14 RX ADMIN — Medication 4: at 11:50

## 2021-03-14 RX ADMIN — GABAPENTIN 300 MILLIGRAM(S): 400 CAPSULE ORAL at 05:18

## 2021-03-14 RX ADMIN — Medication 650 MILLIGRAM(S): at 11:53

## 2021-03-14 NOTE — PROGRESS NOTE ADULT - SUBJECTIVE AND OBJECTIVE BOX
PROGRESS NOTE:     CONTACT INFO:  Darien Modi MD (Resident Physician - PGY-1 - Internal Medicine)  [email or web address]  Pager: [phone] (any site) or [phone] ([facility] only)    Patient is a 81y old  Female who presents with a chief complaint of CHF Exacerbation (14 Mar 2021 07:50)    [place] [facility] #[id number]; but pt asked to have daughter interpret and interpretation services were discontinued as per pt request    SUBJECTIVE / OVERNIGHT EVENTS: No acute events overnight. Patient seen and evaluated at bedside. Subjectively improving. Still having LE pain; likely neuropathic. No fever/chills.  Denies SOB at rest, chest pain, palpitations, abdominal pain, nausea/vomiting    ADDITIONAL REVIEW OF SYSTEMS:    MEDICATIONS  (STANDING):  apixaban 2.5 milliGRAM(s) Oral two times a day  dextrose 40% Gel 15 Gram(s) Oral once  dextrose 5%. 1000 milliLiter(s) (50 mL/Hr) IV Continuous <Continuous>  dextrose 5%. 1000 milliLiter(s) (100 mL/Hr) IV Continuous <Continuous>  dextrose 50% Injectable 25 Gram(s) IV Push once  dextrose 50% Injectable 12.5 Gram(s) IV Push once  dextrose 50% Injectable 25 Gram(s) IV Push once  famotidine    Tablet 40 milliGRAM(s) Oral at bedtime  furosemide   Injectable 40 milliGRAM(s) IV Push two times a day  gabapentin 300 milliGRAM(s) Oral three times a day  glucagon  Injectable 1 milliGRAM(s) IntraMuscular once  insulin lispro (ADMELOG) corrective regimen sliding scale   SubCutaneous three times a day before meals  insulin lispro (ADMELOG) corrective regimen sliding scale   SubCutaneous at bedtime  metoprolol succinate  milliGRAM(s) Oral daily  polyethylene glycol 3350 17 Gram(s) Oral daily  senna 1 Tablet(s) Oral daily  simvastatin 40 milliGRAM(s) Oral at bedtime  spironolactone 25 milliGRAM(s) Oral daily    MEDICATIONS  (PRN):  acetaminophen   Tablet .. 650 milliGRAM(s) Oral every 6 hours PRN Mild Pain (1 - 3)  albuterol/ipratropium for Nebulization 3 milliLiter(s) Nebulizer every 6 hours PRN Shortness of Breath and/or Wheezing  oxycodone    5 mG/acetaminophen 325 mG 1 Tablet(s) Oral every 6 hours PRN Moderate Pain (4 - 6)      CAPILLARY BLOOD GLUCOSE      POCT Blood Glucose.: 171 mg/dL (14 Mar 2021 16:43)  POCT Blood Glucose.: 240 mg/dL (14 Mar 2021 11:39)  POCT Blood Glucose.: 169 mg/dL (14 Mar 2021 07:25)  POCT Blood Glucose.: 282 mg/dL (13 Mar 2021 21:42)    I&O's Summary    13 Mar 2021 06:01  -  14 Mar 2021 07:00  --------------------------------------------------------  IN: 740 mL / OUT: 2100 mL / NET: -1360 mL    14 Mar 2021 07:01  -  14 Mar 2021 18:59  --------------------------------------------------------  IN: 0 mL / OUT: 800 mL / NET: -800 mL        PHYSICAL EXAM:  Vital Signs Last 24 Hrs  T(C): 37.2 (14 Mar 2021 12:25), Max: 37.2 (14 Mar 2021 12:25)  T(F): 99 (14 Mar 2021 12:25), Max: 99 (14 Mar 2021 12:25)  HR: 76 (14 Mar 2021 18:12) (70 - 77)  BP: 145/92 (14 Mar 2021 18:12) (113/61 - 145/92)  BP(mean): --  RR: 18 (14 Mar 2021 12:25) (17 - 19)  SpO2: 93% (14 Mar 2021 12:25) (93% - 100%)    GENERAL: NAD; seated in bedside chair  HEAD: Atraumatic, Normocephalic  EYES: EOMI, PERRLA, conjunctiva and sclera clear  NECK: No JVD. FROM of neck   CHEST/LUNG: No tachypnea. Moderately diminished breathsounds in all auscultated fields; base > apices, but w/interval improvement. General pulm exam improved vs. prior. Multiple scars on chest 2/2 mastectomy   HEART: RRR; No murmurs, rubs, or gallops. Slightly diminished heart sounds likely 2/2 habitus  ABDOMEN: Soft, Nontender, Nondistended; Bowel sounds present. Multiple operative scars on abdomen  EXTREMITIES:  2+ Peripheral Pulses x4. Trace BL LE Edema today; L>R. Mildy TTP of BL legs. LLE erythema is clinically improving w/o antibiotics; poor demarcation. LLE improved vs. prior and less concerning for cellulitis at this time     NERVOUS SYSTEM:  Alert & Oriented X4, Good concentration  PSYCH: Normal Affect. Speaking in Full Sentences; not agitated    LABS:                        9.8    7.35  )-----------( 286      ( 14 Mar 2021 05:43 )             34.7     03-14    136  |  91<L>  |  37<H>  ----------------------------<  160<H>  4.2   |  35<H>  |  1.71<H>    Ca    9.6      14 Mar 2021 05:43  Phos  4.5     03-14  Mg     2.1     03-14      PT/INR - ( 14 Mar 2021 09:04 )   PT: 16.6 sec;   INR: 1.43 ratio         PTT - ( 14 Mar 2021 09:04 )  PTT:32.3 sec            RADIOLOGY & ADDITIONAL TESTS:  Results Reviewed:   Imaging Personally Reviewed:  Electrocardiogram Personally Reviewed:    COORDINATION OF CARE:  Care Discussed with Consultants/Other Providers [Y/N]:  Prior or Outpatient Records Reviewed [Y/N]:

## 2021-03-14 NOTE — PROGRESS NOTE ADULT - ASSESSMENT
79 y/o F hx/ HTN, HLD, DM2 not on insulin, CAD s/p 2-vessel CABG 11/9/2020, Stage II Diastolic CHF (EF 60% 11/20/20), Breast cancer s/p right partial mastectomy and chemo (1989), Rectal Ca s/p resection and radiation 2015, R calf DVT on eliquis, anemia, CKDIII (Cr: 1.59 baseline) recent [facility] admission 2/4-2/8 for CHF exacerbation p/w increasing weight/swelling, sob, and difficulty ambulating x2 weeks likely 2/2 CHF exacerbation with no known inciting event.     -Echo w/severe pulmHTN; cardiology concerned for biased interpretation given severe hypervolemia at time of exam. Will repeat when more euvolemic  -Significant improvement of BL LE exam; diuresis progressing well  -Still w/volume overload in the lungs, but improve vs. prior  -Pt having some LLE pain still, improved vs prior 81 y/o F hx/ HTN, HLD, DM2 not on insulin, CAD s/p 2-vessel CABG 11/9/2020, Stage II Diastolic CHF (EF 60% 11/20/20), Breast cancer s/p right partial mastectomy and chemo (1989), Rectal Ca s/p resection and radiation 2015, R calf DVT on eliquis, anemia, CKDIII (Cr: 1.59 baseline) recent [facility] admission 2/4-2/8 for CHF exacerbation p/w increasing weight/swelling, sob, and difficulty ambulating x2 weeks likely 2/2 CHF exacerbation with no known inciting event.     -Echo w/severe pulmHTN; cardiology concerned for biased interpretation given severe hypervolemia at time of exam. Will repeat when more euvolemic  -Significant improvement of BL LE exam; diuresis progressing well  -Still w/volume overload in the lungs, but improve vs. prior  -Pt having some LLE pain still, improved vs prior. Likely neuropathic and less concerning for cellulitis by the day

## 2021-03-15 LAB
ANION GAP SERPL CALC-SCNC: 10 MMOL/L — SIGNIFICANT CHANGE UP (ref 5–17)
BASOPHILS # BLD AUTO: 0.03 K/UL — SIGNIFICANT CHANGE UP (ref 0–0.2)
BASOPHILS NFR BLD AUTO: 0.4 % — SIGNIFICANT CHANGE UP (ref 0–2)
BUN SERPL-MCNC: 40 MG/DL — HIGH (ref 7–23)
CALCIUM SERPL-MCNC: 9.7 MG/DL — SIGNIFICANT CHANGE UP (ref 8.4–10.5)
CHLORIDE SERPL-SCNC: 91 MMOL/L — LOW (ref 96–108)
CO2 SERPL-SCNC: 36 MMOL/L — HIGH (ref 22–31)
CREAT SERPL-MCNC: 1.93 MG/DL — HIGH (ref 0.5–1.3)
EOSINOPHIL # BLD AUTO: 0.13 K/UL — SIGNIFICANT CHANGE UP (ref 0–0.5)
EOSINOPHIL NFR BLD AUTO: 1.7 % — SIGNIFICANT CHANGE UP (ref 0–6)
GLUCOSE BLDC GLUCOMTR-MCNC: 203 MG/DL — HIGH (ref 70–99)
GLUCOSE BLDC GLUCOMTR-MCNC: 203 MG/DL — HIGH (ref 70–99)
GLUCOSE BLDC GLUCOMTR-MCNC: 234 MG/DL — HIGH (ref 70–99)
GLUCOSE BLDC GLUCOMTR-MCNC: 258 MG/DL — HIGH (ref 70–99)
GLUCOSE BLDC GLUCOMTR-MCNC: 333 MG/DL — HIGH (ref 70–99)
GLUCOSE SERPL-MCNC: 197 MG/DL — HIGH (ref 70–99)
HCT VFR BLD CALC: 35.8 % — SIGNIFICANT CHANGE UP (ref 34.5–45)
HGB BLD-MCNC: 10.3 G/DL — LOW (ref 11.5–15.5)
IMM GRANULOCYTES NFR BLD AUTO: 0.1 % — SIGNIFICANT CHANGE UP (ref 0–1.5)
LYMPHOCYTES # BLD AUTO: 1.54 K/UL — SIGNIFICANT CHANGE UP (ref 1–3.3)
LYMPHOCYTES # BLD AUTO: 20.7 % — SIGNIFICANT CHANGE UP (ref 13–44)
MAGNESIUM SERPL-MCNC: 2 MG/DL — SIGNIFICANT CHANGE UP (ref 1.6–2.6)
MCHC RBC-ENTMCNC: 24.6 PG — LOW (ref 27–34)
MCHC RBC-ENTMCNC: 28.8 GM/DL — LOW (ref 32–36)
MCV RBC AUTO: 85.6 FL — SIGNIFICANT CHANGE UP (ref 80–100)
MONOCYTES # BLD AUTO: 0.85 K/UL — SIGNIFICANT CHANGE UP (ref 0–0.9)
MONOCYTES NFR BLD AUTO: 11.4 % — SIGNIFICANT CHANGE UP (ref 2–14)
NEUTROPHILS # BLD AUTO: 4.87 K/UL — SIGNIFICANT CHANGE UP (ref 1.8–7.4)
NEUTROPHILS NFR BLD AUTO: 65.7 % — SIGNIFICANT CHANGE UP (ref 43–77)
NRBC # BLD: 0 /100 WBCS — SIGNIFICANT CHANGE UP (ref 0–0)
PHOSPHATE SERPL-MCNC: 3.5 MG/DL — SIGNIFICANT CHANGE UP (ref 2.5–4.5)
PLATELET # BLD AUTO: 326 K/UL — SIGNIFICANT CHANGE UP (ref 150–400)
POTASSIUM SERPL-MCNC: 4.1 MMOL/L — SIGNIFICANT CHANGE UP (ref 3.5–5.3)
POTASSIUM SERPL-SCNC: 4.1 MMOL/L — SIGNIFICANT CHANGE UP (ref 3.5–5.3)
RBC # BLD: 4.18 M/UL — SIGNIFICANT CHANGE UP (ref 3.8–5.2)
RBC # FLD: 17.6 % — HIGH (ref 10.3–14.5)
SODIUM SERPL-SCNC: 137 MMOL/L — SIGNIFICANT CHANGE UP (ref 135–145)
WBC # BLD: 7.43 K/UL — SIGNIFICANT CHANGE UP (ref 3.8–10.5)
WBC # FLD AUTO: 7.43 K/UL — SIGNIFICANT CHANGE UP (ref 3.8–10.5)

## 2021-03-15 PROCEDURE — 99233 SBSQ HOSP IP/OBS HIGH 50: CPT

## 2021-03-15 PROCEDURE — 93306 TTE W/DOPPLER COMPLETE: CPT | Mod: 26

## 2021-03-15 PROCEDURE — 99233 SBSQ HOSP IP/OBS HIGH 50: CPT | Mod: GC

## 2021-03-15 PROCEDURE — 93923 UPR/LXTR ART STDY 3+ LVLS: CPT | Mod: 26

## 2021-03-15 RX ORDER — GABAPENTIN 400 MG/1
300 CAPSULE ORAL
Refills: 0 | Status: DISCONTINUED | OUTPATIENT
Start: 2021-03-15 | End: 2021-03-18

## 2021-03-15 RX ORDER — FUROSEMIDE 40 MG
20 TABLET ORAL DAILY
Refills: 0 | Status: DISCONTINUED | OUTPATIENT
Start: 2021-03-15 | End: 2021-03-15

## 2021-03-15 RX ORDER — FAMOTIDINE 10 MG/ML
20 INJECTION INTRAVENOUS AT BEDTIME
Refills: 0 | Status: DISCONTINUED | OUTPATIENT
Start: 2021-03-15 | End: 2021-03-18

## 2021-03-15 RX ADMIN — SENNA PLUS 1 TABLET(S): 8.6 TABLET ORAL at 11:51

## 2021-03-15 RX ADMIN — Medication 150 MILLIGRAM(S): at 05:04

## 2021-03-15 RX ADMIN — OXYCODONE AND ACETAMINOPHEN 1 TABLET(S): 5; 325 TABLET ORAL at 04:00

## 2021-03-15 RX ADMIN — Medication 4: at 08:41

## 2021-03-15 RX ADMIN — OXYCODONE AND ACETAMINOPHEN 1 TABLET(S): 5; 325 TABLET ORAL at 18:25

## 2021-03-15 RX ADMIN — APIXABAN 2.5 MILLIGRAM(S): 2.5 TABLET, FILM COATED ORAL at 05:04

## 2021-03-15 RX ADMIN — Medication 40 MILLIGRAM(S): at 05:04

## 2021-03-15 RX ADMIN — APIXABAN 2.5 MILLIGRAM(S): 2.5 TABLET, FILM COATED ORAL at 17:30

## 2021-03-15 RX ADMIN — Medication 650 MILLIGRAM(S): at 22:30

## 2021-03-15 RX ADMIN — Medication 4: at 17:28

## 2021-03-15 RX ADMIN — Medication 8: at 12:19

## 2021-03-15 RX ADMIN — GABAPENTIN 300 MILLIGRAM(S): 400 CAPSULE ORAL at 05:04

## 2021-03-15 RX ADMIN — SIMVASTATIN 40 MILLIGRAM(S): 20 TABLET, FILM COATED ORAL at 21:45

## 2021-03-15 RX ADMIN — Medication 650 MILLIGRAM(S): at 21:45

## 2021-03-15 RX ADMIN — SPIRONOLACTONE 25 MILLIGRAM(S): 25 TABLET, FILM COATED ORAL at 05:04

## 2021-03-15 RX ADMIN — OXYCODONE AND ACETAMINOPHEN 1 TABLET(S): 5; 325 TABLET ORAL at 17:36

## 2021-03-15 RX ADMIN — GABAPENTIN 300 MILLIGRAM(S): 400 CAPSULE ORAL at 17:30

## 2021-03-15 RX ADMIN — FAMOTIDINE 20 MILLIGRAM(S): 10 INJECTION INTRAVENOUS at 21:45

## 2021-03-15 RX ADMIN — OXYCODONE AND ACETAMINOPHEN 1 TABLET(S): 5; 325 TABLET ORAL at 03:15

## 2021-03-15 RX ADMIN — POLYETHYLENE GLYCOL 3350 17 GRAM(S): 17 POWDER, FOR SOLUTION ORAL at 11:51

## 2021-03-15 NOTE — PROGRESS NOTE ADULT - SUBJECTIVE AND OBJECTIVE BOX
PROGRESS NOTE:     CONTACT INFO:  Darien Modi MD (Resident Physician - PGY-1 - Internal Medicine)  [email or web address]  Pager: [phone] (any site) or [phone] ([facility] only)    Patient is a 81y old  Female who presents with a chief complaint of CHF Exacerbation (15 Mar 2021 09:14)    [place] interp: Fern[place] #[id number]    SUBJECTIVE / OVERNIGHT EVENTS:  No acute events overnight. Patient seen and evaluated at bedside. Respiratory status improving. LE pain baseline vs. mildly improved. No fever/chills. Denies SOB at rest, chest pain, palpitations, abdominal pain, nausea/vomiting    ADDITIONAL REVIEW OF SYSTEMS:    MEDICATIONS  (STANDING):  apixaban 2.5 milliGRAM(s) Oral two times a day  dextrose 40% Gel 15 Gram(s) Oral once  dextrose 5%. 1000 milliLiter(s) (50 mL/Hr) IV Continuous <Continuous>  dextrose 5%. 1000 milliLiter(s) (100 mL/Hr) IV Continuous <Continuous>  dextrose 50% Injectable 25 Gram(s) IV Push once  dextrose 50% Injectable 12.5 Gram(s) IV Push once  dextrose 50% Injectable 25 Gram(s) IV Push once  famotidine    Tablet 20 milliGRAM(s) Oral at bedtime  gabapentin 300 milliGRAM(s) Oral two times a day  glucagon  Injectable 1 milliGRAM(s) IntraMuscular once  insulin lispro (ADMELOG) corrective regimen sliding scale   SubCutaneous three times a day before meals  insulin lispro (ADMELOG) corrective regimen sliding scale   SubCutaneous at bedtime  metoprolol succinate  milliGRAM(s) Oral daily  polyethylene glycol 3350 17 Gram(s) Oral daily  senna 1 Tablet(s) Oral daily  simvastatin 40 milliGRAM(s) Oral at bedtime  spironolactone 25 milliGRAM(s) Oral daily    MEDICATIONS  (PRN):  acetaminophen   Tablet .. 650 milliGRAM(s) Oral every 6 hours PRN Mild Pain (1 - 3)  albuterol/ipratropium for Nebulization 3 milliLiter(s) Nebulizer every 6 hours PRN Shortness of Breath and/or Wheezing  oxycodone    5 mG/acetaminophen 325 mG 1 Tablet(s) Oral every 6 hours PRN Moderate Pain (4 - 6)      CAPILLARY BLOOD GLUCOSE      POCT Blood Glucose.: 333 mg/dL (15 Mar 2021 12:02)  POCT Blood Glucose.: 203 mg/dL (15 Mar 2021 07:50)  POCT Blood Glucose.: 236 mg/dL (14 Mar 2021 22:03)  POCT Blood Glucose.: 171 mg/dL (14 Mar 2021 16:43)    I&O's Summary    14 Mar 2021 07:01  -  15 Mar 2021 07:00  --------------------------------------------------------  IN: 300 mL / OUT: 2800 mL / NET: -2500 mL    15 Mar 2021 07:01  -  15 Mar 2021 16:09  --------------------------------------------------------  IN: 830 mL / OUT: 0 mL / NET: 830 mL        PHYSICAL EXAM:  Vital Signs Last 24 Hrs  T(C): 36.5 (15 Mar 2021 12:41), Max: 36.7 (15 Mar 2021 05:00)  T(F): 97.7 (15 Mar 2021 12:41), Max: 98 (15 Mar 2021 05:00)  HR: 91 (15 Mar 2021 12:41) (76 - 91)  BP: 105/66 (15 Mar 2021 12:41) (105/66 - 145/92)  BP(mean): --  RR: 20 (15 Mar 2021 12:41) (17 - 20)  SpO2: 93% (15 Mar 2021 12:41) (93% - 95%)    GENERAL: NAD; sleep in bed w/head of bed elevated to ~30*  HEAD: Atraumatic, Normocephalic  EYES: EOMI, PERRLA, conjunctiva and sclera clear  NECK: No JVD. FROM of neck   CHEST/LUNG: No tachypnea. Lungs are CTAB today. Multiple scars on chest 2/2 mastectomy   HEART: RRR; No murmurs, rubs, or gallops. Slightly diminished heart sounds likely 2/2 habitus  ABDOMEN: Soft, Nontender, Nondistended; Bowel sounds present. Multiple operative scars on abdomen  EXTREMITIES:  2+ Peripheral Pulses x4. Trace BL LE edema. Mildy TTP of BL legs. LLE erythema is virtually resolved  NERVOUS SYSTEM:  Alert & Oriented X4, Good concentration  PSYCH: Normal Affect. Speaking in Full Sentences; not agitated    LABS:                        10.3   7.43  )-----------( 326      ( 15 Mar 2021 06:19 )             35.8     03-15    137  |  91<L>  |  40<H>  ----------------------------<  197<H>  4.1   |  36<H>  |  1.93<H>    Ca    9.7      15 Mar 2021 06:19  Phos  3.5     03-15  Mg     2.0     03-15      PT/INR - ( 14 Mar 2021 09:04 )   PT: 16.6 sec;   INR: 1.43 ratio         PTT - ( 14 Mar 2021 09:04 )  PTT:32.3 sec            RADIOLOGY & ADDITIONAL TESTS:  Results Reviewed:   Imaging Personally Reviewed:  Electrocardiogram Personally Reviewed:    COORDINATION OF CARE:  Care Discussed with Consultants/Other Providers [Y/N]:  Prior or Outpatient Records Reviewed [Y/N]:

## 2021-03-15 NOTE — PROGRESS NOTE ADULT - ASSESSMENT
79 y/o F hx/ HTN, HLD, DM2 not on insulin, CAD s/p 2-vessel CABG 11/9/2020, Stage II Diastolic CHF (EF 60% 11/20/20), Breast cancer s/p right partial mastectomy and chemo (1989), Rectal Ca s/p resection and radiation 2015, R calf DVT on eliquis, anemia, CKDIII (Cr: 1.59 baseline) recent [facility] admission 2/4-2/8 for CHF exacerbation p/w increasing weight/swelling, sob, and difficulty ambulating x2 weeks likely 2/2 CHF exacerbation with no known inciting event.     -Echo w/severe pulmHTN; cardiology concerned for biased interpretation given severe hypervolemia at time of exam. Will repeat when more euvolemic  -Significant improvement of BL LE exam; diuresis progressing well  -Still w/volume overload in the lungs, but improve vs. prior  -Pt having some LLE pain still, improved vs prior. Likely neuropathic and less concerning for cellulitis by the day 81 y/o F hx/ HTN, HLD, DM2 not on insulin, CAD s/p 2-vessel CABG 11/9/2020, Stage II Diastolic CHF (EF 60% 11/20/20), Breast cancer s/p right partial mastectomy and chemo (1989), Rectal Ca s/p resection and radiation 2015, R calf DVT on eliquis, anemia, CKDIII (Cr: 1.59 baseline) recent [facility] admission 2/4-2/8 for CHF exacerbation p/w increasing weight/swelling, sob, and difficulty ambulating x2 weeks likely 2/2 CHF exacerbation with no known inciting event.     -Initial echo w/severe pulmHTN; repeat w/moderate pulmHTN  -LE with scant edema  -Lungs are CTAB today  -Pt having some LLE pain still, but continues to improve Likely neuropathic and less concerning for cellulitis by the day  -SCr rising; reducing diuresis today to IV lasix 40mg x1

## 2021-03-15 NOTE — PROGRESS NOTE ADULT - PROBLEM SELECTOR PLAN 5
CKDIII GFR:30; baseline Cr: 1.4-1.6  - Here 1.48 at baseline   - Monitor BMP QD, avoid nephrotoxic drugs  -nephro on board; recs appreciated Acute on chronic w/rising SCr past 72h; reducing lasix to qd    CKDIII GFR:30; baseline Cr: 1.4-1.6  - Here 1.48 at baseline   - Monitor BMP QD, avoid nephrotoxic drugs  -nephro on board; recs appreciated

## 2021-03-15 NOTE — PROGRESS NOTE ADULT - PROBLEM SELECTOR PLAN 3
Previous hx of Right DVT 2019 on eliquis though patient was supposed to increase to 5mg bid only on 2.5mg bid  - At this time LLE>R in diameter, more tender, and erythematous  - negative dvt study  - c/w eliquis 5mg BID Previous hx of Right DVT 2019 on eliquis though patient was supposed to increase to 5mg bid only on 2.5mg bid (due to renal insufficiency)  - At this time LLE>R in diameter, more tender, and erythematous  - negative dvt study  - c/w eliquis 2.5mg BID

## 2021-03-15 NOTE — PROGRESS NOTE ADULT - ASSESSMENT
80F HTN, HLD, DM2, CAD s/p CABG, breast CA s/p chemo/mastectomy, rectal CA s/p XRT/resection, RLE DVT on apixaban, and CKD3 p/w ADHFpEF.    #ADHFpEF  #pHTN  -Remains VOL on exam  -C/w furosemide 40mg IV BID  -Strict Is/Os, daily wts  -Target net neg 1-2L/day  -Daily labs; maintain K>4, Mg>2  -C/w spirono 25mg daily  -C/w simva 40 and meto succ 150mg daily  -pHTN likely post-capillary in setting of VOL; repeat TTE when euvolemic to r/a pulm pressures    #DVT  -Would renally dose apixaban at 2.5mg BID    #Will follow 80F HTN, HLD, DM2, CAD s/p CABG, breast CA s/p chemo/mastectomy, rectal CA s/p XRT/resection, RLE DVT on apixaban, and CKD3 p/w ADHFpEF.    #ADHFpEF  #pHTN  -Remains VOL on exam  -C/w furosemide 40mg IV BID  -will c/w monitoring Cr in the setting of diuresis; if continuing to uptrend, will likely decrease diuretics  -recommend compression stocking b/l  -Strict Is/Os, daily wts  -Target net neg 1-2L/day  -Daily labs; maintain K>4, Mg>2  -C/w spirono 25mg daily  -C/w toprol 150mg daily  -change simvastatin to lipitor 40  -pHTN likely post-capillary in setting of VOL; repeat TTE when euvolemic to r/a pulm pressures    #DVT  -Would renally dose apixaban at 2.5mg BID    #Will follow    Nayan Wellington MD  Cardiology Fellow - F1  Text or Call: [phone]  For all New Consults and Questions:  [email or web address]   Login: [email or web address] NURIA GARCIA is an 80F HTN, HLD, DM2, CAD s/p CABG, breast CA s/p chemo/mastectomy, rectal CA s/p XRT/resection, RLE DVT on apixaban, and CKD3 p/w ADHFpEF.  she continues to be volume overloaded on exam will continue IV diuresis    #ADHFpEF  #pHTN  -Remains VOL on exam  -C/w furosemide 40mg IV BID  -will c/w monitoring Cr in the setting of diuresis; if continuing to uptrend, will likely decrease diuretics  -recommend compression stocking b/l  -Strict Is/Os, daily wts  -Target net neg 1-2L/day  -Daily labs; maintain K>4, Mg>2  -C/w spirono 25mg daily  -C/w toprol 150mg daily  -change simvastatin to lipitor 40  -pHTN likely post-capillary in setting of VOL; repeat TTE when euvolemic to r/a pulm pressures    #DVT  -Would renally dose apixaban at 2.5mg BID    #Will follow    outpatient cardiologist Dr Hagan, however due to the hx of br CA s/p chemo and radiation, please refer for outpt cardio onc fu as well    Nayan Wellington MD  Cardiology Fellow - F1  Text or Call: [phone]  For all New Consults and Questions:  [email or web address]   Login: [email or web address]    Patient seen and examined with the fellow, the note above has been edited to reflect my independent history, physical exam, assessment and plan.      Sushant Sewell MD, PhD  Cardiology Attending  [facility]/ [facility]    For day time coverage Mon-Fri see Non-Service Consult Attending on [email or web address], password: [email or web address]; daytime weekends covered by general cardiology consult service attending.)

## 2021-03-15 NOTE — PROGRESS NOTE ADULT - PROBLEM SELECTOR PLAN 2
At this time LLE>R in diameter, more tender, and erythematous, shiny with good pulses   - LE doppler neg for DVT   - monitor for cellulitis   - CHF exacerbation management as above At this time LLE>R in diameter, more tender, and erythematous, shiny with good pulses   - LE doppler neg for DVT   - monitor for cellulitis; no current clinical concerns  - CHF exacerbation management as above

## 2021-03-15 NOTE — PROGRESS NOTE ADULT - SUBJECTIVE AND OBJECTIVE BOX
Overnight events noted      VITAL:  T(C): , Max: 37.2 (03-14-21 @ 12:25)  T(F): , Max: 99 (03-14-21 @ 12:25)  HR: 76 (03-15-21 @ 05:00)  BP: 120/74 (03-15-21 @ 05:00)  BP(mean): --  RR: 17 (03-15-21 @ 05:00)  SpO2: 95% (03-15-21 @ 05:00)  urine output 2800cc/24h      PHYSICAL EXAM:  Constitutional: NAD, Alert; obese  HEENT: NCAT, MMM  Neck: Supple, No JVD  Respiratory: CTA-b/l  Cardiovascular: RRR s1s2, no m/r/g  Gastrointestinal: BS+, soft, NT/ND  Extremities: 1-2+ b/l LE edema  Neurological: no focal deficits; strength grossly intact  Back: no CVAT b/l  Skin: (+)confluent erythema left leg    LABS:                        10.3   7.43  )-----------( 326      ( 15 Mar 2021 06:19 )             35.8     Na(137)/K(4.1)/Cl(91)/HCO3(36)/BUN(40)/Cr(1.93)Glu(197)/Ca(9.7)/Mg(2.0)/PO4(3.5)    03-15 @ 06:19  Na(136)/K(4.2)/Cl(91)/HCO3(35)/BUN(37)/Cr(1.71)Glu(160)/Ca(9.6)/Mg(2.1)/PO4(4.5)    03-14 @ 05:43  Na(138)/K(4.1)/Cl(94)/HCO3(34)/BUN(31)/Cr(1.57)Glu(180)/Ca(9.6)/Mg(1.9)/PO4(2.8)    03-13 @ 05:24  Na(140)/K(3.6)/Cl(92)/HCO3(36)/BUN(25)/Cr(1.51)Glu(259)/Ca(9.2)/Mg(1.8)/PO4(2.7)    03-12 @ 09:21      IMPRESSION: 81F w/ HTN, DM2, CAD-CABG, HFpEF, breast CA, rectal CA, and CKD3-4, 3/10/21 a/w acute on chronic HFpEF      (1)CKD - stage 3-4, with minimal proteinuria - largely due to hypertension; potentially a component as well from diabetic nephropathy.     (2)TAO - mild - highly likely prerenal mediated, in setting of obligate diuresis with IV Lasix and PO Spironolactone    (3)CV - acute on chronic HFpEF - resolving CHF flare      RECOMMEND:  (1)Can continue diuretics as ordered for now; if creatinine trends above 2.5mg/dL, then would look to hold the diuretics    (2)K+ repletion - 20meq po x 2    (3)BMP daily    (4)Monitoring of LLE for worsening of erythema; if to require abx, would dose for GFR 30ml/min              Ignacio Morataya MD  [facility]  Office: [phone]  Cell: [phone]           Overnight events noted   VITAL: T(C): , Max: 37.2 (03-14-21 @ 12:25) T(F): , Max: 99 (03-14-21 @ 12:25) HR: 76 (03-15-21 @ 05:00) BP: 120/74 (03-15-21 @ 05:00) BP(mean): -- RR: 17 (03-15-21 @ 05:00) SpO2: 95% (03-15-21 @ 05:00) urine output 2800cc/24h   PHYSICAL EXAM: Constitutional: NAD, Alert; obese HEENT: NCAT, MMM Neck: Supple, No JVD Respiratory: CTA-b/l Cardiovascular: RRR s1s2, no m/r/g Gastrointestinal: BS+, soft, NT/ND Extremities: 1-2+ b/l LE edema Neurological: no focal deficits; strength grossly intact Back: no CVAT b/l Skin: (+)confluent erythema left leg  LABS:                      10.3  7.43  )-----------( 326      ( 15 Mar 2021 06:19 )            35.8   Na(137)/K(4.1)/Cl(91)/HCO3(36)/BUN(40)/Cr(1.93)Glu(197)/Ca(9.7)/Mg(2.0)/PO4(3.5)    03-15 @ 06:19 Na(136)/K(4.2)/Cl(91)/HCO3(35)/BUN(37)/Cr(1.71)Glu(160)/Ca(9.6)/Mg(2.1)/PO4(4.5)    03-14 @ 05:43 Na(138)/K(4.1)/Cl(94)/HCO3(34)/BUN(31)/Cr(1.57)Glu(180)/Ca(9.6)/Mg(1.9)/PO4(2.8)    03-13 @ 05:24 Na(140)/K(3.6)/Cl(92)/HCO3(36)/BUN(25)/Cr(1.51)Glu(259)/Ca(9.2)/Mg(1.8)/PO4(2.7)    03-12 @ 09:21   IMPRESSION: 81F w/ HTN, DM2, CAD-CABG, HFpEF, breast CA, rectal CA, and CKD3-4, 3/10/21 a/w acute on chronic HFpEF   (1)CKD - stage 3-4, with minimal proteinuria - largely due to hypertension; potentially a component as well from diabetic nephropathy.   (2)TAO - mild - highly likely prerenal mediated, in setting of obligate diuresis with IV Lasix and PO Spironolactone  (3)Acid-base - contraction alkalosis vs chronic primary respiratory acidosis  (4)CV - acute on chronic HFpEF - resolving CHF flare   RECOMMEND: (1)Can continue diuretics as ordered for now; if creatinine trends above 2.5mg/dL, then would look to hold the diuretics  (2)ABG vs VBG if HCO3 trends to 38 or higher - if HCO3 >38 and pH>7.45, then would stop the IV Lasix and instead place on IV Diamox (375mg iv bid)  (3)BMP daily  (4)Meds for GFR 20-30ml/min (present dosing is acceptable)     Ignacio Morataya MD [facility] Office: [phone] Cell: [phone]       No pain, no sob   VITAL: T(C): , Max: 37.2 (03-14-21 @ 12:25) T(F): , Max: 99 (03-14-21 @ 12:25) HR: 76 (03-15-21 @ 05:00) BP: 120/74 (03-15-21 @ 05:00) BP(mean): -- RR: 17 (03-15-21 @ 05:00) SpO2: 95% (03-15-21 @ 05:00) urine output 2800cc/24h   PHYSICAL EXAM: Constitutional: NAD, Alert; obese HEENT: NCAT, MMM Neck: Supple, No JVD Respiratory: CTA-b/l Cardiovascular: RRR s1s2, no m/r/g Gastrointestinal: BS+, soft, NT/ND Extremities: 1-2+ b/l LE edema Neurological: no focal deficits; strength grossly intact Back: no CVAT b/l Skin: (+)confluent erythema left leg  LABS:                      10.3  7.43  )-----------( 326      ( 15 Mar 2021 06:19 )            35.8   Na(137)/K(4.1)/Cl(91)/HCO3(36)/BUN(40)/Cr(1.93)Glu(197)/Ca(9.7)/Mg(2.0)/PO4(3.5)    03-15 @ 06:19 Na(136)/K(4.2)/Cl(91)/HCO3(35)/BUN(37)/Cr(1.71)Glu(160)/Ca(9.6)/Mg(2.1)/PO4(4.5)    03-14 @ 05:43 Na(138)/K(4.1)/Cl(94)/HCO3(34)/BUN(31)/Cr(1.57)Glu(180)/Ca(9.6)/Mg(1.9)/PO4(2.8)    03-13 @ 05:24 Na(140)/K(3.6)/Cl(92)/HCO3(36)/BUN(25)/Cr(1.51)Glu(259)/Ca(9.2)/Mg(1.8)/PO4(2.7)    03-12 @ 09:21   IMPRESSION: 81F w/ HTN, DM2, CAD-CABG, HFpEF, breast CA, rectal CA, and CKD3-4, 3/10/21 a/w acute on chronic HFpEF   (1)CKD - stage 3-4, with minimal proteinuria - largely due to hypertension; potentially a component as well from diabetic nephropathy.   (2)TAO - mild - highly likely prerenal mediated, in setting of obligate diuresis with IV Lasix and PO Spironolactone  (3)Acid-base - contraction alkalosis vs chronic primary respiratory acidosis  (4)CV - acute on chronic HFpEF - resolving CHF flare   RECOMMEND: (1)Can continue diuretics as ordered for now; if creatinine trends above 2.5mg/dL, then would look to hold the diuretics  (2)ABG vs VBG if HCO3 trends to 38 or higher - if HCO3 >38 and pH>7.45, then would stop the IV Lasix and instead place on IV Diamox (375mg iv bid)  (3)BMP daily  (4)Meds for GFR 20-30ml/min (present dosing is acceptable)     Ignacio Morataya MD [facility] Office: [phone] Cell: [phone]

## 2021-03-15 NOTE — PROGRESS NOTE ADULT - PROBLEM SELECTOR PLAN 1
Known Stage II Diastolic CHF (EF 60% 11/20/20) HJR- but Clinically Overloaded  -CXR showing pulmonary edema b/l; BNP 2810, EKG grossly normal, low voltage   - C/w 40mg IV lasix BID, c/w metoprolol 150mg qd  -Daily weights, fluid 1.5L restriction, salt restriction  -Echo 3/11 EF 50%, w/severe pulmHTN, but likely 2/2 severe volume overload  -Repeat echo when more euvolemic Known Stage II Diastolic CHF (EF 60% 11/20/20) HJR- but Clinically Overloaded  -CXR showing pulmonary edema b/l; BNP 2810, EKG grossly normal, low voltage   - C/w 40mg IV lasix qd (down from BID), c/w metoprolol 150mg qd  -Daily weights, fluid 1.5L restriction, salt restriction  -Echo 3/11 EF 50%, w/severe pulmHTN, but likely 2/2 severe volume overload  -Repeat echo 3/14 w/moderate pulmHTN

## 2021-03-16 LAB
ANION GAP SERPL CALC-SCNC: 10 MMOL/L — SIGNIFICANT CHANGE UP (ref 5–17)
BUN SERPL-MCNC: 44 MG/DL — HIGH (ref 7–23)
CALCIUM SERPL-MCNC: 9.8 MG/DL — SIGNIFICANT CHANGE UP (ref 8.4–10.5)
CHLORIDE SERPL-SCNC: 92 MMOL/L — LOW (ref 96–108)
CO2 SERPL-SCNC: 35 MMOL/L — HIGH (ref 22–31)
CREAT SERPL-MCNC: 1.89 MG/DL — HIGH (ref 0.5–1.3)
GLUCOSE BLDC GLUCOMTR-MCNC: 206 MG/DL — HIGH (ref 70–99)
GLUCOSE BLDC GLUCOMTR-MCNC: 229 MG/DL — HIGH (ref 70–99)
GLUCOSE BLDC GLUCOMTR-MCNC: 251 MG/DL — HIGH (ref 70–99)
GLUCOSE BLDC GLUCOMTR-MCNC: 273 MG/DL — HIGH (ref 70–99)
GLUCOSE SERPL-MCNC: 221 MG/DL — HIGH (ref 70–99)
HCT VFR BLD CALC: 36.3 % — SIGNIFICANT CHANGE UP (ref 34.5–45)
HGB BLD-MCNC: 10.7 G/DL — LOW (ref 11.5–15.5)
MAGNESIUM SERPL-MCNC: 2.1 MG/DL — SIGNIFICANT CHANGE UP (ref 1.6–2.6)
MCHC RBC-ENTMCNC: 24.9 PG — LOW (ref 27–34)
MCHC RBC-ENTMCNC: 29.5 GM/DL — LOW (ref 32–36)
MCV RBC AUTO: 84.6 FL — SIGNIFICANT CHANGE UP (ref 80–100)
NRBC # BLD: 0 /100 WBCS — SIGNIFICANT CHANGE UP (ref 0–0)
PHOSPHATE SERPL-MCNC: 3.4 MG/DL — SIGNIFICANT CHANGE UP (ref 2.5–4.5)
PLATELET # BLD AUTO: 291 K/UL — SIGNIFICANT CHANGE UP (ref 150–400)
POTASSIUM SERPL-MCNC: 4.5 MMOL/L — SIGNIFICANT CHANGE UP (ref 3.5–5.3)
POTASSIUM SERPL-SCNC: 4.5 MMOL/L — SIGNIFICANT CHANGE UP (ref 3.5–5.3)
RBC # BLD: 4.29 M/UL — SIGNIFICANT CHANGE UP (ref 3.8–5.2)
RBC # FLD: 17.7 % — HIGH (ref 10.3–14.5)
SODIUM SERPL-SCNC: 137 MMOL/L — SIGNIFICANT CHANGE UP (ref 135–145)
WBC # BLD: 6.47 K/UL — SIGNIFICANT CHANGE UP (ref 3.8–10.5)
WBC # FLD AUTO: 6.47 K/UL — SIGNIFICANT CHANGE UP (ref 3.8–10.5)

## 2021-03-16 PROCEDURE — 73560 X-RAY EXAM OF KNEE 1 OR 2: CPT | Mod: 26,RT

## 2021-03-16 PROCEDURE — 99233 SBSQ HOSP IP/OBS HIGH 50: CPT | Mod: GC

## 2021-03-16 PROCEDURE — 99233 SBSQ HOSP IP/OBS HIGH 50: CPT

## 2021-03-16 RX ORDER — SODIUM CHLORIDE 9 MG/ML
1000 INJECTION, SOLUTION INTRAVENOUS
Refills: 0 | Status: DISCONTINUED | OUTPATIENT
Start: 2021-03-16 | End: 2021-03-18

## 2021-03-16 RX ORDER — DEXTROSE 50 % IN WATER 50 %
12.5 SYRINGE (ML) INTRAVENOUS ONCE
Refills: 0 | Status: DISCONTINUED | OUTPATIENT
Start: 2021-03-16 | End: 2021-03-18

## 2021-03-16 RX ORDER — INSULIN LISPRO 100/ML
VIAL (ML) SUBCUTANEOUS AT BEDTIME
Refills: 0 | Status: DISCONTINUED | OUTPATIENT
Start: 2021-03-16 | End: 2021-03-17

## 2021-03-16 RX ORDER — OXYCODONE AND ACETAMINOPHEN 5; 325 MG/1; MG/1
1 TABLET ORAL EVERY 6 HOURS
Refills: 0 | Status: DISCONTINUED | OUTPATIENT
Start: 2021-03-16 | End: 2021-03-18

## 2021-03-16 RX ORDER — INSULIN GLARGINE 100 [IU]/ML
5 INJECTION, SOLUTION SUBCUTANEOUS AT BEDTIME
Refills: 0 | Status: DISCONTINUED | OUTPATIENT
Start: 2021-03-16 | End: 2021-03-18

## 2021-03-16 RX ORDER — INSULIN LISPRO 100/ML
3 VIAL (ML) SUBCUTANEOUS
Refills: 0 | Status: DISCONTINUED | OUTPATIENT
Start: 2021-03-16 | End: 2021-03-18

## 2021-03-16 RX ORDER — BUMETANIDE 0.25 MG/ML
1 INJECTION INTRAMUSCULAR; INTRAVENOUS DAILY
Refills: 0 | Status: DISCONTINUED | OUTPATIENT
Start: 2021-03-16 | End: 2021-03-17

## 2021-03-16 RX ORDER — DEXTROSE 50 % IN WATER 50 %
25 SYRINGE (ML) INTRAVENOUS ONCE
Refills: 0 | Status: DISCONTINUED | OUTPATIENT
Start: 2021-03-16 | End: 2021-03-18

## 2021-03-16 RX ORDER — GLUCAGON INJECTION, SOLUTION 0.5 MG/.1ML
1 INJECTION, SOLUTION SUBCUTANEOUS ONCE
Refills: 0 | Status: DISCONTINUED | OUTPATIENT
Start: 2021-03-16 | End: 2021-03-18

## 2021-03-16 RX ORDER — DEXTROSE 50 % IN WATER 50 %
15 SYRINGE (ML) INTRAVENOUS ONCE
Refills: 0 | Status: DISCONTINUED | OUTPATIENT
Start: 2021-03-16 | End: 2021-03-18

## 2021-03-16 RX ORDER — INSULIN LISPRO 100/ML
VIAL (ML) SUBCUTANEOUS
Refills: 0 | Status: DISCONTINUED | OUTPATIENT
Start: 2021-03-16 | End: 2021-03-17

## 2021-03-16 RX ADMIN — OXYCODONE AND ACETAMINOPHEN 1 TABLET(S): 5; 325 TABLET ORAL at 00:50

## 2021-03-16 RX ADMIN — OXYCODONE AND ACETAMINOPHEN 1 TABLET(S): 5; 325 TABLET ORAL at 22:44

## 2021-03-16 RX ADMIN — POLYETHYLENE GLYCOL 3350 17 GRAM(S): 17 POWDER, FOR SOLUTION ORAL at 12:03

## 2021-03-16 RX ADMIN — INSULIN GLARGINE 5 UNIT(S): 100 INJECTION, SOLUTION SUBCUTANEOUS at 21:59

## 2021-03-16 RX ADMIN — APIXABAN 2.5 MILLIGRAM(S): 2.5 TABLET, FILM COATED ORAL at 17:18

## 2021-03-16 RX ADMIN — Medication 3 UNIT(S): at 17:18

## 2021-03-16 RX ADMIN — Medication 150 MILLIGRAM(S): at 05:08

## 2021-03-16 RX ADMIN — OXYCODONE AND ACETAMINOPHEN 1 TABLET(S): 5; 325 TABLET ORAL at 15:11

## 2021-03-16 RX ADMIN — Medication 4: at 07:53

## 2021-03-16 RX ADMIN — SPIRONOLACTONE 25 MILLIGRAM(S): 25 TABLET, FILM COATED ORAL at 05:08

## 2021-03-16 RX ADMIN — BUMETANIDE 1 MILLIGRAM(S): 0.25 INJECTION INTRAMUSCULAR; INTRAVENOUS at 12:03

## 2021-03-16 RX ADMIN — GABAPENTIN 300 MILLIGRAM(S): 400 CAPSULE ORAL at 05:08

## 2021-03-16 RX ADMIN — FAMOTIDINE 20 MILLIGRAM(S): 10 INJECTION INTRAVENOUS at 21:59

## 2021-03-16 RX ADMIN — Medication 6: at 12:19

## 2021-03-16 RX ADMIN — SENNA PLUS 1 TABLET(S): 8.6 TABLET ORAL at 12:03

## 2021-03-16 RX ADMIN — GABAPENTIN 300 MILLIGRAM(S): 400 CAPSULE ORAL at 17:18

## 2021-03-16 RX ADMIN — SIMVASTATIN 40 MILLIGRAM(S): 20 TABLET, FILM COATED ORAL at 22:00

## 2021-03-16 RX ADMIN — OXYCODONE AND ACETAMINOPHEN 1 TABLET(S): 5; 325 TABLET ORAL at 21:59

## 2021-03-16 RX ADMIN — OXYCODONE AND ACETAMINOPHEN 1 TABLET(S): 5; 325 TABLET ORAL at 15:45

## 2021-03-16 RX ADMIN — APIXABAN 2.5 MILLIGRAM(S): 2.5 TABLET, FILM COATED ORAL at 05:08

## 2021-03-16 RX ADMIN — Medication 3: at 17:17

## 2021-03-16 RX ADMIN — OXYCODONE AND ACETAMINOPHEN 1 TABLET(S): 5; 325 TABLET ORAL at 00:17

## 2021-03-16 NOTE — REASON FOR VISIT
Pt here for BP check.  After resting 5 min,   BP is 130/67  P 65    Reports drinking 2-3 cups of coffee before arrival   Smoking prior to arrival  Took BP med this am.    Pt is thinking about quitting smoking but will let Radha know when she has other affairs in order.    
[Initial Visit ___] : [unfilled] is here today for an initial visit  for [unfilled]
[Initial Visit ___] : [unfilled] is here today for an initial visit  for [unfilled]

## 2021-03-16 NOTE — PROGRESS NOTE ADULT - ASSESSMENT
79 y/o F hx/ HTN, HLD, DM2 not on insulin, CAD s/p 2-vessel CABG 11/9/2020, Stage II Diastolic CHF (EF 60% 11/20/20), Breast cancer s/p right partial mastectomy and chemo (1989), Rectal Ca s/p resection and radiation 2015, R calf DVT on eliquis, anemia, CKDIII (Cr: 1.59 baseline) recent [facility] admission 2/4-2/8 for CHF exacerbation p/w increasing weight/swelling, sob, and difficulty ambulating x2 weeks likely 2/2 CHF exacerbation with no known inciting event.     -Initial echo w/severe pulmHTN; repeat w/moderate pulmHTN  -LE with scant edema  -Lungs are CTAB today  -Pt having some LLE pain still, but continues to improve Likely neuropathic and less concerning for cellulitis by the day  -SCr rising; reducing diuresis today to IV lasix 40mg x1 81 y/o F hx/ HTN, HLD, DM2 not on insulin, CAD s/p 2-vessel CABG 11/9/2020, Stage II Diastolic CHF (EF 60% 11/20/20), Breast cancer s/p right partial mastectomy and chemo (1989), Rectal Ca s/p resection and radiation 2015, R calf DVT on eliquis, anemia, CKDIII (Cr: 1.59 baseline) recent [facility] admission 2/4-2/8 for CHF exacerbation p/w increasing weight/swelling, sob, and difficulty ambulating x2 weeks likely 2/2 CHF exacerbation with no known inciting event.     -Initial echo w/severe pulmHTN likely ISO acute and severe volume overload; repeat w/moderate pulmHTN  -Euvolemic on exam  -Neuropathic LE pain continues  -Possible new R knee effusion; to be evaluated via XR. Of note; known chronic R DVT may be confounding factor if no effusion noted  -SCr improving w/reduction of diuresis on 3/16   -Transitioning to PO Bumex

## 2021-03-16 NOTE — PROGRESS NOTE ADULT - PROBLEM SELECTOR PLAN 1
Known Stage II Diastolic CHF (EF 60% 11/20/20) HJR- but Clinically Overloaded  -CXR showing pulmonary edema b/l; BNP 2810, EKG grossly normal, low voltage   - C/w 40mg IV lasix qd (down from BID), c/w metoprolol 150mg qd  -Daily weights, fluid 1.5L restriction, salt restriction  -Echo 3/11 EF 50%, w/severe pulmHTN, but likely 2/2 severe volume overload  -Repeat echo 3/14 w/moderate pulmHTN Known Stage II Diastolic CHF (EF 60% 11/20/20) HJR- but Clinically Overloaded  -CXR showing pulmonary edema b/l; BNP 2810, EKG grossly normal, low voltage   -starting bumex 1mg PO qd, c/w metoprolol 150mg qd  -s/p aggressive IV diuresis; euvolemic on exam  -Daily weights, fluid 1.5L restriction, salt restriction  -Echo 3/11 EF 50%, w/severe pulmHTN, but likely 2/2 severe volume overload  -Repeat echo 3/14 w/moderate pulmHTN

## 2021-03-16 NOTE — PROGRESS NOTE ADULT - PROBLEM SELECTOR PLAN 2
At this time LLE>R in diameter, more tender, and erythematous, shiny with good pulses   - LE doppler neg for DVT   - monitor for cellulitis; no current clinical concerns  - CHF exacerbation management as above At this time LLE>R in diameter, more tender, and erythematous, shiny with good pulses   - LE doppler neg for DVT   - monitor for cellulitis; no current clinical concerns  - CHF exacerbation management as above    #Right Knee Swelling  -New/increasing R knee swelling on exam w/pain  -TTP and boggy, but free of erythema  -May be confounded by underlying, known, chronic DVT  -XR to evaluate; unlikely to tap in acute setting

## 2021-03-16 NOTE — PROGRESS NOTE ADULT - SUBJECTIVE AND OBJECTIVE BOX
Overnight events noted   VITAL: T(C): , Max: 36.7 (03-15-21 @ 21:38) T(F): , Max: 98 (03-15-21 @ 21:38) HR: 77 (03-16-21 @ 04:58) BP: 138/75 (03-16-21 @ 04:58) BP(mean): 100 (03-16-21 @ 00:15) RR: 18 (03-16-21 @ 04:58) SpO2: 93% (03-16-21 @ 04:58)   PHYSICAL EXAM: Constitutional: NAD, Alert; obese HEENT: NCAT, MMM Neck: Supple, No JVD Respiratory: CTA-b/l Cardiovascular: RRR s1s2, no m/r/g Gastrointestinal: BS+, soft, NT/ND Extremities: 1-2+ b/l LE edema Neurological: no focal deficits; strength grossly intact Back: no CVAT b/l Skin: (+)confluent erythema left leg   LABS:                      10.7  6.47  )-----------( 291      ( 16 Mar 2021 05:44 )            36.3   Na(137)/K(4.5)/Cl(92)/HCO3(35)/BUN(44)/Cr(1.89)Glu(221)/Ca(9.8)/Mg(2.1)/PO4(3.4)    03-16 @ 05:44 Na(137)/K(4.1)/Cl(91)/HCO3(36)/BUN(40)/Cr(1.93)Glu(197)/Ca(9.7)/Mg(2.0)/PO4(3.5)    03-15 @ 06:19 Na(136)/K(4.2)/Cl(91)/HCO3(35)/BUN(37)/Cr(1.71)Glu(160)/Ca(9.6)/Mg(2.1)/PO4(4.5)    03-14 @ 05:43   IMPRESSION: 81F w/ HTN, DM2, CAD-CABG, HFpEF, breast CA, rectal CA, and CKD3-4, 3/10/21 a/w acute on chronic HFpEF  (1)CKD - stage 3-4, with minimal proteinuria - largely due to hypertension; potentially a component as well from diabetic nephropathy.   (2)TAO - fluctuating numbers based on hemodynamic status - creatinine down a bit today, s/p reduction in diuretic dosage yesterday  (3)Acid-base - contraction alkalosis vs chronic primary respiratory acidosis - HCO3 high but stable  (4)CV - acute on chronic HFpEF - resolving/resolved CHF flare   RECOMMEND: (1)Diuretics as ordered (2)Dose new meds for GFR 30ml/min (3)Can f/u with my partner Dr. Igor Kan 1-2 months after discharge        Ignacio Morataya MD [facility] Office: [phone] Cell: [phone]        No pain, no sob   VITAL: T(C): , Max: 36.7 (03-15-21 @ 21:38) T(F): , Max: 98 (03-15-21 @ 21:38) HR: 77 (03-16-21 @ 04:58) BP: 138/75 (03-16-21 @ 04:58) BP(mean): 100 (03-16-21 @ 00:15) RR: 18 (03-16-21 @ 04:58) SpO2: 93% (03-16-21 @ 04:58)   PHYSICAL EXAM: Constitutional: NAD, Alert; obese HEENT: NCAT, MMM Neck: Supple, No JVD Respiratory: CTA-b/l Cardiovascular: RRR s1s2, no m/r/g Gastrointestinal: BS+, soft, NT/ND Extremities: 1-2+ b/l LE edema Neurological: no focal deficits; strength grossly intact Back: no CVAT b/l Skin: (+)confluent erythema left leg   LABS:                      10.7  6.47  )-----------( 291      ( 16 Mar 2021 05:44 )            36.3   Na(137)/K(4.5)/Cl(92)/HCO3(35)/BUN(44)/Cr(1.89)Glu(221)/Ca(9.8)/Mg(2.1)/PO4(3.4)    03-16 @ 05:44 Na(137)/K(4.1)/Cl(91)/HCO3(36)/BUN(40)/Cr(1.93)Glu(197)/Ca(9.7)/Mg(2.0)/PO4(3.5)    03-15 @ 06:19 Na(136)/K(4.2)/Cl(91)/HCO3(35)/BUN(37)/Cr(1.71)Glu(160)/Ca(9.6)/Mg(2.1)/PO4(4.5)    03-14 @ 05:43   IMPRESSION: 81F w/ HTN, DM2, CAD-CABG, HFpEF, breast CA, rectal CA, and CKD3-4, 3/10/21 a/w acute on chronic HFpEF  (1)CKD - stage 3-4, with minimal proteinuria - largely due to hypertension; potentially a component as well from diabetic nephropathy.   (2)TAO - fluctuating numbers based on hemodynamic status - creatinine down a bit today, s/p reduction in diuretic dosage yesterday  (3)Acid-base - contraction alkalosis vs chronic primary respiratory acidosis - HCO3 high but stable  (4)CV - acute on chronic HFpEF - resolving/resolved CHF flare   RECOMMEND: (1)Diuretics as ordered (2)Dose new meds for GFR 30ml/min (3)Can f/u with my partner Dr. Igor Kan 1-2 months after discharge     Ignacio Morataya MD [facility] Office: [phone] Cell: [phone]

## 2021-03-16 NOTE — PROGRESS NOTE ADULT - ASSESSMENT
NURIA GARCIA is an 80F HTN, HLD, DM2, CAD s/p CABG, breast CA s/p chemo/mastectomy, rectal CA s/p XRT/resection, RLE DVT on apixaban, and CKD3 p/w ADHFpEF.  she continues to be volume overloaded on exam will continue IV diuresis    #ADHFpEF  #pHTN  -Remains VOL on exam  -C/w furosemide 40mg IV BID  -will c/w monitoring Cr in the setting of diuresis; if continuing to uptrend, will likely decrease diuretics  -recommend compression stocking b/l  -Strict Is/Os, daily wts  -Target net neg 1-2L/day  -Daily labs; maintain K>4, Mg>2  -C/w spirono 25mg daily  -C/w toprol 150mg daily  -change simvastatin to lipitor 40  -pHTN likely post-capillary in setting of VOL; repeat TTE when euvolemic to r/a pulm pressures    #DVT  -Would renally dose apixaban at 2.5mg BID    #Will follow    outpatient cardiologist Dr Hagan, however due to the hx of br CA s/p chemo and radiation, please refer for outpt cardio onc fu as well    Nayan Wellington MD  Cardiology Fellow - F1  Text or Call: [phone]  For all New Consults and Questions:  [email or web address]   Login: "[email or web address] NURIA GARCIA is an 80F HTN, HLD, DM2, CAD s/p CABG, breast CA s/p chemo/mastectomy, rectal CA s/p XRT/resection, RLE DVT on apixaban, and CKD3 p/w ADHFpEF.  she continues to be volume overloaded on exam will continue IV diuresis    #ADHFpEF  #pHTN  -Remains VOL on exam  -recommend restarting furosemide 40mg IV BID  -will c/w monitoring Cr in the setting of diuresis; if continuing to uptrend, will likely decrease diuretics  -recommend compression stocking b/l  -Strict Is/Os, daily wts  -Target net neg 1-2L/day  -Daily labs; maintain K>4, Mg>2  -C/w spirono 25mg daily  -C/w toprol 150mg daily  -change simvastatin to lipitor 40  -pHTN likely post-capillary in setting of VOL; repeat TTE when euvolemic to r/a pulm pressures    #DVT  -c/w 2.5mg BID    #Will follow    outpatient cardiologist Dr Hagan, however due to the hx of br CA s/p chemo and radiation, please refer for outpt cardio onc fu as well    Nayan Wellington MD  Cardiology Fellow - F1  Text or Call: [phone]  For all New Consults and Questions:  [email or web address]   Login: [email or web address]

## 2021-03-16 NOTE — PROGRESS NOTE ADULT - SUBJECTIVE AND OBJECTIVE BOX
INTERVAL EVENTS: No o/n events. Denies CP, dyspnea, palpitations, presyncope, syncope, f/c/n/v.     REVIEW OF SYSTEMS:  Constitutional:     [X] negative [ ] fevers [ ] chills [ ] weight loss [ ] weight gain  HEENT:                  [X] negative [ ] dry eyes [ ] eye irritation [ ] postnasal drip [ ] nasal congestion  CV:                         [X] negative  [ ] chest pain [ ] orthopnea [ ] palpitations [ ] murmur  Resp:                     [X] negative [ ] cough [ ] shortness of breath [ ] dyspnea [ ] wheezing [ ] sputum [ ] hemoptysis  GI:                          [X] negative [ ] nausea [ ] vomiting [ ] diarrhea [ ] constipation [ ] abd pain [ ] dysphagia   :                        [X] negative [ ] dysuria [ ] nocturia [ ] hematuria [ ] increased urinary frequency  MSK:                      [X] negative [ ] back pain [ ] myalgias [ ] arthralgias [ ] fracture  Skin:                       [X] negative [ ] rash [ ] itch  Neuro:                   [X] negative [ ] headache [ ] dizziness [ ] syncope [ ] weakness [ ] numbness  Psych:                    [X] negative [ ] anxiety [ ] depression  Endo:                     [X] negative [ ] diabetes [ ] thyroid problem  Heme/Lymph:      [X] negative [ ] anemia [ ] bleeding problem  Allergic/Immune: [X] negative [ ] itchy eyes [ ] nasal discharge [ ] hives [ ] angioedema    [X] All other systems negative or otherwise described above.  [ ] Unable to assess ROS because ________.    PAST MEDICAL & SURGICAL HISTORY:  Renal insufficiency    Elevated blood pressure reading in office with diagnosis of hypertension    DVT of leg (deep venous thrombosis)  right calf DVT  2019 pt on Eliquis    CHF (congestive heart failure)    Type II diabetes mellitus    Obese    S/P radiation &gt; 12 weeks  2015-3/2015    S/P chemotherapy, time since greater than 12 weeks  2015-3/2015    Rectal cancer  s/p chemotherapy and  radiation 12 weeks 2015 -3/2015    Hyperlipidemia    Lymphedema of arm  right arm s/p mastectomy    Diabetes mellitus    HTN (hypertension)    Breast CA, right      Rectal cancer    Cancer    Lymphedema of arm  Right arm - right mastectomy    Colon cancer  Radiationh &amp; Oral Chemotherapy    Breast CA, right   s/p mastectomy ,IV Chemotherapy    Obesity (BMI 35.0-39.9 without comorbidity)    Hyperlipidemia    HTN (hypertension)    DM (diabetes mellitus)  Type II    S/P TKR (total knee replacement), right  2017    S/P colon resection      S/P ileostomy  low anterior resection-8/10/15, reversal of ileostomy     History of appendectomy  1953    S/P mastectomy, right      S/P colectomy      History of tonsillectomy    H/O mastectomy, right        MEDICATIONS  (STANDING):  apixaban 2.5 milliGRAM(s) Oral two times a day  dextrose 40% Gel 15 Gram(s) Oral once  dextrose 5%. 1000 milliLiter(s) (50 mL/Hr) IV Continuous <Continuous>  dextrose 5%. 1000 milliLiter(s) (100 mL/Hr) IV Continuous <Continuous>  dextrose 50% Injectable 25 Gram(s) IV Push once  dextrose 50% Injectable 12.5 Gram(s) IV Push once  dextrose 50% Injectable 25 Gram(s) IV Push once  famotidine    Tablet 20 milliGRAM(s) Oral at bedtime  gabapentin 300 milliGRAM(s) Oral two times a day  glucagon  Injectable 1 milliGRAM(s) IntraMuscular once  insulin lispro (ADMELOG) corrective regimen sliding scale   SubCutaneous three times a day before meals  insulin lispro (ADMELOG) corrective regimen sliding scale   SubCutaneous at bedtime  metoprolol succinate  milliGRAM(s) Oral daily  polyethylene glycol 3350 17 Gram(s) Oral daily  senna 1 Tablet(s) Oral daily  simvastatin 40 milliGRAM(s) Oral at bedtime  spironolactone 25 milliGRAM(s) Oral daily    MEDICATIONS  (PRN):  acetaminophen   Tablet .. 650 milliGRAM(s) Oral every 6 hours PRN Mild Pain (1 - 3)  albuterol/ipratropium for Nebulization 3 milliLiter(s) Nebulizer every 6 hours PRN Shortness of Breath and/or Wheezing  oxycodone    5 mG/acetaminophen 325 mG 1 Tablet(s) Oral every 6 hours PRN Moderate Pain (4 - 6)    ICU Vital Signs Last 24 Hrs  T(C): 36.5 (16 Mar 2021 04:58), Max: 36.7 (15 Mar 2021 21:38)  T(F): 97.7 (16 Mar 2021 04:58), Max: 98 (15 Mar 2021 21:38)  HR: 77 (16 Mar 2021 04:58) (77 - 91)  BP: 138/75 (16 Mar 2021 04:58) (105/66 - 148/76)  BP(mean): 100 (16 Mar 2021 00:15) (100 - 100)  ABP: --  ABP(mean): --  RR: 18 (16 Mar 2021 04:58) (17 - 20)  SpO2: 93% (16 Mar 2021 04:58) (92% - 93%)    Daily     Daily Weight in k.7 (15 Mar 2021 08:57)    PHYSICAL EXAM:  GEN: Awake, alert. NAD.   HEENT: NCAT, PERRL, EOMI. Mucosa moist. No JVD.  RESP: CTA b/l  CV: RRR. Normal S1/S2. No m/r/g.  ABD: Soft. NT/ND. BS+  EXT: Warm. No edema, clubbing, or cyanosis.   NEURO: AAOx3. No focal deficits.     LABS:                        10.7   6.47  )-----------( 291      ( 16 Mar 2021 05:44 )             36.3     03-16    137  |  92<L>  |  44<H>  ----------------------------<  221<H>  4.5   |  35<H>  |  1.89<H>    Ca    9.8      16 Mar 2021 05:44  Phos  3.4     03-16  Mg     2.1     03-16          PT/INR - ( 14 Mar 2021 09:04 )   PT: 16.6 sec;   INR: 1.43 ratio         PTT - ( 14 Mar 2021 09:04 )  PTT:32.3 sec    I&O's Summary    15 Mar 2021 07:01  -  16 Mar 2021 07:00  --------------------------------------------------------  IN: 1490 mL / OUT: 1100 mL / NET: 390 mL      BNP    RADIOLOGY & ADDITIONAL STUDIES:    TELEMETRY: reviewed    EKG: reviewed         INTERVAL EVENTS: No o/n events. Denies CP, dyspnea, palpitations, presyncope, syncope, f/c/n/v.     REVIEW OF SYSTEMS:  Constitutional:     [X] negative [ ] fevers [ ] chills [ ] weight loss [ ] weight gain  HEENT:                  [X] negative [ ] dry eyes [ ] eye irritation [ ] postnasal drip [ ] nasal congestion  CV:                         [X] negative  [ ] chest pain [ ] orthopnea [ ] palpitations [ ] murmur  Resp:                     [X] negative [ ] cough [ ] shortness of breath [ ] dyspnea [ ] wheezing [ ] sputum [ ] hemoptysis  GI:                          [X] negative [ ] nausea [ ] vomiting [ ] diarrhea [ ] constipation [ ] abd pain [ ] dysphagia   :                        [X] negative [ ] dysuria [ ] nocturia [ ] hematuria [ ] increased urinary frequency  MSK:                      [X] negative [ ] back pain [ ] myalgias [ ] arthralgias [ ] fracture  Skin:                       [X] negative [ ] rash [ ] itch  Neuro:                   [X] negative [ ] headache [ ] dizziness [ ] syncope [ ] weakness [ ] numbness  Psych:                    [X] negative [ ] anxiety [ ] depression  Endo:                     [X] negative [ ] diabetes [ ] thyroid problem  Heme/Lymph:      [X] negative [ ] anemia [ ] bleeding problem  Allergic/Immune: [X] negative [ ] itchy eyes [ ] nasal discharge [ ] hives [ ] angioedema    [X] All other systems negative or otherwise described above.  [ ] Unable to assess ROS because ________.    PAST MEDICAL & SURGICAL HISTORY:  Renal insufficiency    Elevated blood pressure reading in office with diagnosis of hypertension    DVT of leg (deep venous thrombosis)  right calf DVT  2019 pt on Eliquis    CHF (congestive heart failure)    Type II diabetes mellitus    Obese    S/P radiation &gt; 12 weeks  2015-3/2015    S/P chemotherapy, time since greater than 12 weeks  2015-3/2015    Rectal cancer  s/p chemotherapy and  radiation 12 weeks 2015 -3/2015    Hyperlipidemia    Lymphedema of arm  right arm s/p mastectomy    Diabetes mellitus    HTN (hypertension)    Breast CA, right      Rectal cancer    Cancer    Lymphedema of arm  Right arm - right mastectomy    Colon cancer  Radiationh &amp; Oral Chemotherapy    Breast CA, right   s/p mastectomy ,IV Chemotherapy    Obesity (BMI 35.0-39.9 without comorbidity)    Hyperlipidemia    HTN (hypertension)    DM (diabetes mellitus)  Type II    S/P TKR (total knee replacement), right  2017    S/P colon resection      S/P ileostomy  low anterior resection-8/10/15, reversal of ileostomy     History of appendectomy  1953    S/P mastectomy, right      S/P colectomy      History of tonsillectomy    H/O mastectomy, right        MEDICATIONS  (STANDING):  apixaban 2.5 milliGRAM(s) Oral two times a day  dextrose 40% Gel 15 Gram(s) Oral once  dextrose 5%. 1000 milliLiter(s) (50 mL/Hr) IV Continuous <Continuous>  dextrose 5%. 1000 milliLiter(s) (100 mL/Hr) IV Continuous <Continuous>  dextrose 50% Injectable 25 Gram(s) IV Push once  dextrose 50% Injectable 12.5 Gram(s) IV Push once  dextrose 50% Injectable 25 Gram(s) IV Push once  famotidine    Tablet 20 milliGRAM(s) Oral at bedtime  gabapentin 300 milliGRAM(s) Oral two times a day  glucagon  Injectable 1 milliGRAM(s) IntraMuscular once  insulin lispro (ADMELOG) corrective regimen sliding scale   SubCutaneous three times a day before meals  insulin lispro (ADMELOG) corrective regimen sliding scale   SubCutaneous at bedtime  metoprolol succinate  milliGRAM(s) Oral daily  polyethylene glycol 3350 17 Gram(s) Oral daily  senna 1 Tablet(s) Oral daily  simvastatin 40 milliGRAM(s) Oral at bedtime  spironolactone 25 milliGRAM(s) Oral daily    MEDICATIONS  (PRN):  acetaminophen   Tablet .. 650 milliGRAM(s) Oral every 6 hours PRN Mild Pain (1 - 3)  albuterol/ipratropium for Nebulization 3 milliLiter(s) Nebulizer every 6 hours PRN Shortness of Breath and/or Wheezing  oxycodone    5 mG/acetaminophen 325 mG 1 Tablet(s) Oral every 6 hours PRN Moderate Pain (4 - 6)    ICU Vital Signs Last 24 Hrs  T(C): 36.5 (16 Mar 2021 04:58), Max: 36.7 (15 Mar 2021 21:38)  T(F): 97.7 (16 Mar 2021 04:58), Max: 98 (15 Mar 2021 21:38)  HR: 77 (16 Mar 2021 04:58) (77 - 91)  BP: 138/75 (16 Mar 2021 04:58) (105/66 - 148/76)  BP(mean): 100 (16 Mar 2021 00:15) (100 - 100)  ABP: --  ABP(mean): --  RR: 18 (16 Mar 2021 04:58) (17 - 20)  SpO2: 93% (16 Mar 2021 04:58) (92% - 93%)    Daily     Daily Weight in k.7 (15 Mar 2021 08:57)    PHYSICAL EXAM:  GEN: Awake, alert. NAD.   HEENT: NCAT, PERRL, EOMI. Mucosa moist. No JVD.  RESP: CTA b/l  CV: RRR. Normal S1/S2. No m/r/g.  ABD: Soft. NT/ND. BS+  EXT: Warm. 1+ pitting edema to the shins b/l, clubbing, or cyanosis.   NEURO: AAOx3. No focal deficits.       LABS:                        10.7   6.47  )-----------( 291      ( 16 Mar 2021 05:44 )             36.3     03-16    137  |  92<L>  |  44<H>  ----------------------------<  221<H>  4.5   |  35<H>  |  1.89<H>    Ca    9.8      16 Mar 2021 05:44  Phos  3.4     03-16  Mg     2.1     03-16          PT/INR - ( 14 Mar 2021 09:04 )   PT: 16.6 sec;   INR: 1.43 ratio         PTT - ( 14 Mar 2021 09:04 )  PTT:32.3 sec    I&O's Summary    15 Mar 2021 07:01  -  16 Mar 2021 07:00  --------------------------------------------------------  IN: 1490 mL / OUT: 1100 mL / NET: 390 mL      BNP    RADIOLOGY & ADDITIONAL STUDIES:    TELEMETRY: reviewed    EKG: reviewed

## 2021-03-16 NOTE — PROGRESS NOTE ADULT - PROBLEM SELECTOR PLAN 5
Acute on chronic w/rising SCr past 72h; reducing lasix to qd    CKDIII GFR:30; baseline Cr: 1.4-1.6  - Here 1.48 at baseline   - Monitor BMP QD, avoid nephrotoxic drugs  -nephro on board; recs appreciated

## 2021-03-16 NOTE — PROGRESS NOTE ADULT - PROBLEM SELECTOR PLAN 3
Previous hx of Right DVT 2019 on eliquis though patient was supposed to increase to 5mg bid only on 2.5mg bid (due to renal insufficiency)  - At this time LLE>R in diameter, more tender, and erythematous  - negative dvt study  - c/w eliquis 2.5mg BID

## 2021-03-16 NOTE — PROGRESS NOTE ADULT - SUBJECTIVE AND OBJECTIVE BOX
PROGRESS NOTE:     CONTACT INFO:  Darien Modi MD (Resident Physician - PGY-1 - Internal Medicine)  [email or web address]  Pager: [phone] (any site) or [phone] ([facility] only)    Patient is a 81y old  Female who presents with a chief complaint of CHF Exacerbation (16 Mar 2021 11:33)    [facility] #[id number]    SUBJECTIVE / OVERNIGHT EVENTS:  No acute events overnight. Reduction of diuretics resulted in improvement in SCr this am. Pt still appears euvolemic on exam despite reduction. Pt still w/BL LE neuropathic like pain. Also reporting R knee stiffness and pain that is more acute in nature; appears to be possible effusion. Patient seen and evaluated at bedside. No fever/chills.  Denies SOB at rest, chest pain, palpitations, abdominal pain, nausea/vomiting    ADDITIONAL REVIEW OF SYSTEMS:    MEDICATIONS  (STANDING):  apixaban 2.5 milliGRAM(s) Oral two times a day  buMETAnide 1 milliGRAM(s) Oral daily  dextrose 40% Gel 15 Gram(s) Oral once  dextrose 5%. 1000 milliLiter(s) (50 mL/Hr) IV Continuous <Continuous>  dextrose 5%. 1000 milliLiter(s) (100 mL/Hr) IV Continuous <Continuous>  dextrose 50% Injectable 25 Gram(s) IV Push once  dextrose 50% Injectable 12.5 Gram(s) IV Push once  dextrose 50% Injectable 25 Gram(s) IV Push once  famotidine    Tablet 20 milliGRAM(s) Oral at bedtime  gabapentin 300 milliGRAM(s) Oral two times a day  glucagon  Injectable 1 milliGRAM(s) IntraMuscular once  insulin lispro (ADMELOG) corrective regimen sliding scale   SubCutaneous three times a day before meals  insulin lispro (ADMELOG) corrective regimen sliding scale   SubCutaneous at bedtime  metoprolol succinate  milliGRAM(s) Oral daily  polyethylene glycol 3350 17 Gram(s) Oral daily  senna 1 Tablet(s) Oral daily  simvastatin 40 milliGRAM(s) Oral at bedtime  spironolactone 25 milliGRAM(s) Oral daily    MEDICATIONS  (PRN):  acetaminophen   Tablet .. 650 milliGRAM(s) Oral every 6 hours PRN Mild Pain (1 - 3)  albuterol/ipratropium for Nebulization 3 milliLiter(s) Nebulizer every 6 hours PRN Shortness of Breath and/or Wheezing  oxycodone    5 mG/acetaminophen 325 mG 1 Tablet(s) Oral every 6 hours PRN Moderate Pain (4 - 6)      CAPILLARY BLOOD GLUCOSE      POCT Blood Glucose.: 273 mg/dL (16 Mar 2021 11:54)  POCT Blood Glucose.: 229 mg/dL (16 Mar 2021 07:24)  POCT Blood Glucose.: 203 mg/dL (15 Mar 2021 21:44)  POCT Blood Glucose.: 234 mg/dL (15 Mar 2021 16:46)    I&O's Summary    15 Mar 2021 07:01  -  16 Mar 2021 07:00  --------------------------------------------------------  IN: 1490 mL / OUT: 1100 mL / NET: 390 mL    16 Mar 2021 07:01  -  16 Mar 2021 13:56  --------------------------------------------------------  IN: 230 mL / OUT: 0 mL / NET: 230 mL        PHYSICAL EXAM:  Vital Signs Last 24 Hrs  T(C): 36.5 (16 Mar 2021 04:58), Max: 36.7 (15 Mar 2021 21:38)  T(F): 97.7 (16 Mar 2021 04:58), Max: 98 (15 Mar 2021 21:38)  HR: 77 (16 Mar 2021 04:58) (77 - 80)  BP: 138/75 (16 Mar 2021 04:58) (111/67 - 148/76)  BP(mean): 100 (16 Mar 2021 00:15) (100 - 100)  RR: 18 (16 Mar 2021 04:58) (17 - 18)  SpO2: 93% (16 Mar 2021 04:58) (92% - 93%)    GENERAL: NAD; seated in chair by bedside and eating breakfast  HEAD: Atraumatic, Normocephalic  EYES: EOMI, PERRLA, conjunctiva and sclera clear  NECK: No JVD. FROM of neck   CHEST/LUNG: No tachypnea. Lungs are CTAB today. Multiple scars on chest 2/2 mastectomy   HEART: RRR; No murmurs, rubs, or gallops. Slightly diminished heart sounds likely 2/2 habitus  ABDOMEN: Soft, Nontender, Nondistended; Bowel sounds present. Multiple operative scars on abdomen  EXTREMITIES:  2+ Peripheral Pulses x4. No LE edema. Mildy TTP of BL legs. LLE erythema is resolved. +R knee medial swelling that is boggy and ttp; could be effusion. R knee is free or erythema and warmth  NERVOUS SYSTEM:  Alert & Oriented X4, Good concentration  PSYCH: Normal Affect. Speaking in Full Sentences; not agitated    LABS:                        10.7   6.47  )-----------( 291      ( 16 Mar 2021 05:44 )             36.3     03-16    137  |  92<L>  |  44<H>  ----------------------------<  221<H>  4.5   |  35<H>  |  1.89<H>    Ca    9.8      16 Mar 2021 05:44  Phos  3.4     03-16  Mg     2.1     03-16                  RADIOLOGY & ADDITIONAL TESTS:  Results Reviewed:   Imaging Personally Reviewed:  Electrocardiogram Personally Reviewed:    COORDINATION OF CARE:  Care Discussed with Consultants/Other Providers [Y/N]:  Prior or Outpatient Records Reviewed [Y/N]:

## 2021-03-16 NOTE — PHARMACOTHERAPY INTERVENTION NOTE - COMMENTS
Patient with TAO on CKD with Scr 1.93 today (calculated CrCl 22). Recommended consider reducing gabapentin from 300 mg TID to BID and famotidine from 40 mg to 20 mg QHS given current renal function.    Sally Dejesus, PharmD, BCPS  [phone]
Patient with DM A1c 7.5, on glimepiride and Januvia at home. Currently being managed with a moderate intensity Admelog sliding scale, with BG above goal in 200s the last few days. Recommended consider initiating basal/bolus insulin regimen with Lantus 5 units QHS and Admelog 3 units TID AC, along with low intensity sliding scale, to improve BG control.    Sally Dejesus, [place], BCPS  [phone]
Initial medication history obtained by the primary team from family member. Clarified additional questions regarding home medications with pharmacy and updated in Outpatient Medication Review.    Home Medications:  amLODIPine 5 mg oral tablet: 1 tab(s) orally once a day (11 Mar 2021 09:04)  Bumex 0.5 mg oral tablet: 1 tab(s) orally once a day, As Needed (11 Mar 2021 09:04) - based on H&P patient was instructed to increase to 1mg prior to admission  Eliquis 2.5 mg oral tablet: 1 tab(s) orally 2 times a day *Eliquis 5mg tablets never picked up; recent rx resumed for 2.5mg BID* (11 Mar 2021 09:04)  famotidine 40 mg oral tablet: 1 tab(s) intravenous once a day (at bedtime) (11 Mar 2021 09:04)  gabapentin enacarbil 300 mg oral tablet, extended release: 3 tab(s) orally once a day (at bedtime) (Takes 600mg tab and 300mg tab at bedtime) (11 Mar 2021 09:04)  glimepiride 2 mg oral tablet: 1 tab(s) orally once a day (11 Mar 2021 09:04)  Januvia 25 mg oral tablet: 1 tab(s) orally once a day (11 Mar 2021 09:04)  Percocet 10/325 oral tablet: 1 tab(s) orally every 6 hours, As Needed (11 Mar 2021 09:04) - fill verified by [facility]  simvastatin 40 mg oral tablet: 1 tab(s) orally once a day (at bedtime) (11 Mar 2021 09:04)  Toprol- mg oral tablet, extended release: 1.5 tab(s) orally once a day (11 Mar 2021 09:04)    *Home medications resumed inpatient excluding amlodipine, oral antihyperglycemics (resumed as high dose ISS), and bumex (diuresing with furosemide 40mg IVP BID)    MEDICATIONS  (STANDING):  apixaban 5 milliGRAM(s) Oral two times a day  famotidine    Tablet 40 milliGRAM(s) Oral at bedtime  furosemide   Injectable 40 milliGRAM(s) IV Push two times a day  gabapentin 900 milliGRAM(s) Oral daily  glucagon  Injectable 1 milliGRAM(s) IntraMuscular once  insulin lispro (ADMELOG) corrective regimen sliding scale   SubCutaneous three times a day before meals  insulin lispro (ADMELOG) corrective regimen sliding scale   SubCutaneous at bedtime  metoprolol succinate  milliGRAM(s) Oral daily  polyethylene glycol 3350 17 Gram(s) Oral daily  senna 1 Tablet(s) Oral daily  simvastatin 40 milliGRAM(s) Oral at bedtime    Recommendations:  - Change gabapentin IR to be administer in divided doses 2-3 times a day for better pain control as patient was taking a total of 900mg at bedtime as extended release formulation.  - Continue eliquis 5mg BID for DVT treatment for now; no dose adjustment recommended by the  for patients with impaired renal function (CrCl <30)  - Consider switching to high intensity statin if patient previously tolerated and not contraindicated    Marta Reagan PharmD  PGY2 Pharmacotherapy Resident   [id number]

## 2021-03-17 ENCOUNTER — TRANSCRIPTION ENCOUNTER (OUTPATIENT)
Age: 81
End: 2021-03-17

## 2021-03-17 LAB
ANION GAP SERPL CALC-SCNC: 12 MMOL/L — SIGNIFICANT CHANGE UP (ref 5–17)
BUN SERPL-MCNC: 43 MG/DL — HIGH (ref 7–23)
CALCIUM SERPL-MCNC: 9.9 MG/DL — SIGNIFICANT CHANGE UP (ref 8.4–10.5)
CHLORIDE SERPL-SCNC: 92 MMOL/L — LOW (ref 96–108)
CO2 SERPL-SCNC: 30 MMOL/L — SIGNIFICANT CHANGE UP (ref 22–31)
CREAT SERPL-MCNC: 1.72 MG/DL — HIGH (ref 0.5–1.3)
GLUCOSE BLDC GLUCOMTR-MCNC: 201 MG/DL — HIGH (ref 70–99)
GLUCOSE BLDC GLUCOMTR-MCNC: 212 MG/DL — HIGH (ref 70–99)
GLUCOSE BLDC GLUCOMTR-MCNC: 269 MG/DL — HIGH (ref 70–99)
GLUCOSE BLDC GLUCOMTR-MCNC: 309 MG/DL — HIGH (ref 70–99)
GLUCOSE SERPL-MCNC: 211 MG/DL — HIGH (ref 70–99)
HCT VFR BLD CALC: 37 % — SIGNIFICANT CHANGE UP (ref 34.5–45)
HGB BLD-MCNC: 10.6 G/DL — LOW (ref 11.5–15.5)
MAGNESIUM SERPL-MCNC: 2.2 MG/DL — SIGNIFICANT CHANGE UP (ref 1.6–2.6)
MCHC RBC-ENTMCNC: 24.5 PG — LOW (ref 27–34)
MCHC RBC-ENTMCNC: 28.6 GM/DL — LOW (ref 32–36)
MCV RBC AUTO: 85.6 FL — SIGNIFICANT CHANGE UP (ref 80–100)
NRBC # BLD: 0 /100 WBCS — SIGNIFICANT CHANGE UP (ref 0–0)
PHOSPHATE SERPL-MCNC: 3.4 MG/DL — SIGNIFICANT CHANGE UP (ref 2.5–4.5)
PLATELET # BLD AUTO: 294 K/UL — SIGNIFICANT CHANGE UP (ref 150–400)
POTASSIUM SERPL-MCNC: 4.4 MMOL/L — SIGNIFICANT CHANGE UP (ref 3.5–5.3)
POTASSIUM SERPL-SCNC: 4.4 MMOL/L — SIGNIFICANT CHANGE UP (ref 3.5–5.3)
RBC # BLD: 4.32 M/UL — SIGNIFICANT CHANGE UP (ref 3.8–5.2)
RBC # FLD: 17.6 % — HIGH (ref 10.3–14.5)
SARS-COV-2 RNA SPEC QL NAA+PROBE: SIGNIFICANT CHANGE UP
SODIUM SERPL-SCNC: 134 MMOL/L — LOW (ref 135–145)
WBC # BLD: 7.57 K/UL — SIGNIFICANT CHANGE UP (ref 3.8–10.5)
WBC # FLD AUTO: 7.57 K/UL — SIGNIFICANT CHANGE UP (ref 3.8–10.5)

## 2021-03-17 PROCEDURE — 99232 SBSQ HOSP IP/OBS MODERATE 35: CPT | Mod: GC

## 2021-03-17 PROCEDURE — 99233 SBSQ HOSP IP/OBS HIGH 50: CPT

## 2021-03-17 RX ORDER — INSULIN LISPRO 100/ML
VIAL (ML) SUBCUTANEOUS AT BEDTIME
Refills: 0 | Status: DISCONTINUED | OUTPATIENT
Start: 2021-03-17 | End: 2021-03-18

## 2021-03-17 RX ORDER — BUMETANIDE 0.25 MG/ML
1 INJECTION INTRAMUSCULAR; INTRAVENOUS
Refills: 0 | Status: DISCONTINUED | OUTPATIENT
Start: 2021-03-17 | End: 2021-03-18

## 2021-03-17 RX ORDER — INSULIN LISPRO 100/ML
VIAL (ML) SUBCUTANEOUS
Refills: 0 | Status: DISCONTINUED | OUTPATIENT
Start: 2021-03-17 | End: 2021-03-18

## 2021-03-17 RX ORDER — ASPIRIN/CALCIUM CARB/MAGNESIUM 324 MG
81 TABLET ORAL DAILY
Refills: 0 | Status: DISCONTINUED | OUTPATIENT
Start: 2021-03-17 | End: 2021-03-18

## 2021-03-17 RX ORDER — ATORVASTATIN CALCIUM 80 MG/1
40 TABLET, FILM COATED ORAL AT BEDTIME
Refills: 0 | Status: DISCONTINUED | OUTPATIENT
Start: 2021-03-17 | End: 2021-03-18

## 2021-03-17 RX ADMIN — GABAPENTIN 300 MILLIGRAM(S): 400 CAPSULE ORAL at 17:40

## 2021-03-17 RX ADMIN — BUMETANIDE 1 MILLIGRAM(S): 0.25 INJECTION INTRAMUSCULAR; INTRAVENOUS at 17:40

## 2021-03-17 RX ADMIN — APIXABAN 2.5 MILLIGRAM(S): 2.5 TABLET, FILM COATED ORAL at 17:40

## 2021-03-17 RX ADMIN — Medication 2: at 08:13

## 2021-03-17 RX ADMIN — POLYETHYLENE GLYCOL 3350 17 GRAM(S): 17 POWDER, FOR SOLUTION ORAL at 12:17

## 2021-03-17 RX ADMIN — SENNA PLUS 1 TABLET(S): 8.6 TABLET ORAL at 12:17

## 2021-03-17 RX ADMIN — Medication 1: at 22:18

## 2021-03-17 RX ADMIN — Medication 3 UNIT(S): at 12:16

## 2021-03-17 RX ADMIN — APIXABAN 2.5 MILLIGRAM(S): 2.5 TABLET, FILM COATED ORAL at 05:55

## 2021-03-17 RX ADMIN — Medication 3 UNIT(S): at 08:12

## 2021-03-17 RX ADMIN — GABAPENTIN 300 MILLIGRAM(S): 400 CAPSULE ORAL at 05:55

## 2021-03-17 RX ADMIN — Medication 150 MILLIGRAM(S): at 05:55

## 2021-03-17 RX ADMIN — Medication 81 MILLIGRAM(S): at 17:40

## 2021-03-17 RX ADMIN — Medication 650 MILLIGRAM(S): at 06:45

## 2021-03-17 RX ADMIN — Medication 3 UNIT(S): at 17:40

## 2021-03-17 RX ADMIN — Medication 650 MILLIGRAM(S): at 05:56

## 2021-03-17 RX ADMIN — FAMOTIDINE 20 MILLIGRAM(S): 10 INJECTION INTRAVENOUS at 22:20

## 2021-03-17 RX ADMIN — ATORVASTATIN CALCIUM 40 MILLIGRAM(S): 80 TABLET, FILM COATED ORAL at 22:21

## 2021-03-17 RX ADMIN — SPIRONOLACTONE 25 MILLIGRAM(S): 25 TABLET, FILM COATED ORAL at 05:55

## 2021-03-17 RX ADMIN — Medication 4: at 17:40

## 2021-03-17 RX ADMIN — INSULIN GLARGINE 5 UNIT(S): 100 INJECTION, SOLUTION SUBCUTANEOUS at 22:18

## 2021-03-17 RX ADMIN — Medication 4: at 12:16

## 2021-03-17 RX ADMIN — BUMETANIDE 1 MILLIGRAM(S): 0.25 INJECTION INTRAMUSCULAR; INTRAVENOUS at 05:56

## 2021-03-17 NOTE — DISCHARGE NOTE PROVIDER - NSDCCPTREATMENT_GEN_ALL_CORE_FT
PRINCIPAL PROCEDURE  Procedure: Transthoracic echocardiogram  Findings and Treatment: Patient name: NURIA GARCIA  YOB: 1940   Age: 81 (F)   MR#: [id number]  Study Date: 3/15/2021  [id number] Lazaro Marc RDCS  Study quality: Limited  Referring Physician: Kaz Metzger MD  Blood Pressure: 105/66 mmHg  Height: 155 cm  Weight: 86 kg  BSA: 1.8 m2  ------------------------------------------------------------------------  PROCEDURE: Transthoracic echocardiogram with 2-D, M-Mode  and complete spectral and color flow Doppler.  INDICATION: Nonrheumatic tricuspid (valve) insufficiency  (I36.1)  -------------------------------------------------------------------------  Observations:  Right Heart: Mild right atrial enlargement. The right  ventricle is not well visualized on axis; grossly mild  right ventricular enlargement with normal right ventricular  systolic function. Normal tricuspid valve. Mild-moderate  tricuspid regurgitation.  Pericardium/Pleura: Normal pericardium with no pericardial  effusion.  Hemodynamic: Estimated right atrial pressure is 8 mm Hg.  Estimated right ventricular systolic pressure equals 56 mm  Hg, assuming right atrial pressure equals 8 mm Hg,  consistent with moderate pulmonary hypertension.  ------------------------------------------------------------------------  Conclusions:  Limited TTE to evaluate the right heart.  1. The right ventricle is not well visualized on axis;  grossly mild right ventricular enlargement with normal  right ventricular systolic function.  2. Normal tricuspid valve. Mild-moderate tricuspid  regurgitation.  3. Estimated pulmonary artery systolic pressure equals 56  mm Hg, assuming right atrial pressure equals 8 mm Hg,  consistent with moderate pulmonary pressures.  *** Compared with echocardiogram of 3/11/2021, estimated PA  systolic pressure is lower on today's study.      SECONDARY PROCEDURE  Procedure: X-ray of right knee, AP view  Findings and Treatment: EXAM:  KNEE; 1 OR 2 VIEWS - RIGHT                        PROCEDURE DATE:  03/16/2021    INTERPRETATION:  CLINICAL INDICATION: Knee pain and swelling  EXAM: AP and crosstable lateral views of the right knee  COMPARISON: 2/6/2021  IMPRESSION:  Status post right knee arthroplasty.  Alignment is intact. Limited assessment for joint effusion given obliqued lateral view. There are vascular calcifications.

## 2021-03-17 NOTE — DISCHARGE NOTE PROVIDER - NSDCMRMEDTOKEN_GEN_ALL_CORE_FT
amLODIPine 5 mg oral tablet: 1 tab(s) orally once a day  Bumex 0.5 mg oral tablet: 1 tab(s) orally once a day, As Needed  Eliquis 2.5 mg oral tablet: 1 tab(s) orally 2 times a day    *Eliquis 5mg tablets never picked up; recent rx resumed for 2.5mg BID*  famotidine 40 mg oral tablet: 1 tab(s) intravenous once a day (at bedtime)  gabapentin enacarbil 300 mg oral tablet, extended release: 3 tab(s) orally once a day (at bedtime)    (Takes 600mg tab and 300mg tab at bedtime)  glimepiride 2 mg oral tablet: 1 tab(s) orally once a day  Januvia 25 mg oral tablet: 1 tab(s) orally once a day  Percocet 10/325 oral tablet: 1 tab(s) orally every 6 hours, As Needed  simvastatin 40 mg oral tablet: 1 tab(s) orally once a day (at bedtime)  Toprol- mg oral tablet, extended release: 1.5 tab(s) orally once a day   amLODIPine 5 mg oral tablet: 1 tab(s) orally once a day  apixaban 2.5 mg oral tablet: 1 tab(s) orally 2 times a day  aspirin 81 mg oral tablet, chewable: 1 tab(s) orally once a day  atorvastatin 40 mg oral tablet: 1 tab(s) orally once a day (at bedtime)  bumetanide 1 mg oral tablet: 1 tab(s) orally 2 times a day  famotidine 20 mg oral tablet: 1 tab(s) orally once a day (at bedtime)  gabapentin 300 mg oral capsule: 1 cap(s) orally 2 times a day  glimepiride 2 mg oral tablet: 1 tab(s) orally once a day  Januvia 25 mg oral tablet: 1 tab(s) orally once a day  Percocet 10/325 oral tablet: 1 tab(s) orally every 6 hours, As Needed  spironolactone 25 mg oral tablet: 1 tab(s) orally once a day  Toprol- mg oral tablet, extended release: 1.5 tab(s) orally once a day   amLODIPine 5 mg oral tablet: 1 tab(s) orally once a day  apixaban 2.5 mg oral tablet: 1 tab(s) orally 2 times a day  aspirin 81 mg oral tablet, chewable: 1 tab(s) orally once a day  atorvastatin 40 mg oral tablet: 1 tab(s) orally once a day (at bedtime)  bumetanide 1 mg oral tablet: 1 tab(s) orally 2 times a day  famotidine 20 mg oral tablet: 1 tab(s) orally once a day (at bedtime)  gabapentin 300 mg oral capsule: 1 cap(s) orally 2 times a day  glimepiride 2 mg oral tablet: 1 tab(s) orally once a day  Januvia 25 mg oral tablet: 1 tab(s) orally once a day  oxycodone-acetaminophen 5 mg-325 mg oral tablet: 1 tab(s) orally every 6 hours, As needed, Moderate Pain (4 - 6) MDD:20mg  spironolactone 25 mg oral tablet: 1 tab(s) orally once a day  Toprol- mg oral tablet, extended release: 1.5 tab(s) orally once a day

## 2021-03-17 NOTE — DISCHARGE NOTE PROVIDER - NSDCFUADDINST_GEN_ALL_CORE_FT
Your cardiologist has been notified of your admission for acute decompensated heart failure and has scheduled you for their first available appointment. They have indicated a desire to see you sooner and will attempt to accommodate an earlier appointment. Please call your cardiology office at discharge in order to assess the availability of earlier appointments.

## 2021-03-17 NOTE — DISCHARGE NOTE PROVIDER - PROVIDER TOKENS
PROVIDER:[TOKEN:[[id number]],SCHEDULEDAPPT:[05/01/2021],SCHEDULEDAPPTTIME:[02:00 PM],ESTABLISHEDPATIENT:[T]]

## 2021-03-17 NOTE — DISCHARGE NOTE PROVIDER - CARE PROVIDER_API CALL
Ld Hagan)  Cardiovascular Disease; Nuclear Cardiology  [phone] [place] [address], [address]  [place] [address]  Phone: [phone]  Fax: [phone]  Established Patient  Scheduled Appointment: 05/01/2021 02:00 PM

## 2021-03-17 NOTE — PROVIDER CONTACT NOTE (OTHER) - SITUATION
Patient experiencing altered mental status.
Pt had PAT x 10 sec 's
Patient had PAT x10 sec HR accelerated in 140s
event on tele: PAT's 2.4 secs  x1

## 2021-03-17 NOTE — DISCHARGE NOTE PROVIDER - INSTRUCTIONS
Restrict fluid intake to 1.2 liters per day. Maintain a balanced diet that is low in sodium and cholesterol as previously discussed with you by your outpatient cardiologist and nephrologist.

## 2021-03-17 NOTE — DISCHARGE NOTE PROVIDER - NSDCFUADDAPPT_GEN_ALL_CORE_FT
Your nephrologist (Dr. Igor Kan) has been notified of your admission and will call you and/or your daughter directly to schedule a follow-up appointment.

## 2021-03-17 NOTE — PROGRESS NOTE ADULT - PROBLEM SELECTOR PLAN 2
At this time LLE>R in diameter, more tender, and erythematous, shiny with good pulses   - LE doppler neg for DVT   - monitor for cellulitis; no current clinical concerns  - CHF exacerbation management as above    #Right Knee Swelling  -New/increasing R knee swelling on exam w/pain  -TTP and boggy, but free of erythema  -May be confounded by underlying, known, chronic DVT  -XR to evaluate; unlikely to tap in acute setting At this time LLE>R in diameter, more tender, and erythematous, shiny with good pulses   - LE doppler neg for DVT   - monitor for cellulitis; no current clinical concerns  - CHF exacerbation management as above    #Right Knee Swelling  -New/increasing R knee swelling on exam w/pain  -TTP and boggy, but free of erythema  -May be confounded by underlying, known, chronic DVT  -XR grossly negative, but limited  -Improving

## 2021-03-17 NOTE — PROGRESS NOTE ADULT - SUBJECTIVE AND OBJECTIVE BOX
INTERVAL EVENTS: No o/n events. Denies CP, dyspnea, palpitations, presyncope, syncope, f/c/n/v.     REVIEW OF SYSTEMS:  Constitutional:     [X] negative [ ] fevers [ ] chills [ ] weight loss [ ] weight gain  HEENT:                  [X] negative [ ] dry eyes [ ] eye irritation [ ] postnasal drip [ ] nasal congestion  CV:                         [X] negative  [ ] chest pain [ ] orthopnea [ ] palpitations [ ] murmur  Resp:                     [X] negative [ ] cough [ ] shortness of breath [ ] dyspnea [ ] wheezing [ ] sputum [ ] hemoptysis  GI:                          [X] negative [ ] nausea [ ] vomiting [ ] diarrhea [ ] constipation [ ] abd pain [ ] dysphagia   :                        [X] negative [ ] dysuria [ ] nocturia [ ] hematuria [ ] increased urinary frequency  MSK:                      [X] negative [ ] back pain [ ] myalgias [ ] arthralgias [ ] fracture  Skin:                       [X] negative [ ] rash [ ] itch  Neuro:                   [X] negative [ ] headache [ ] dizziness [ ] syncope [ ] weakness [ ] numbness  Psych:                    [X] negative [ ] anxiety [ ] depression  Endo:                     [X] negative [ ] diabetes [ ] thyroid problem  Heme/Lymph:      [X] negative [ ] anemia [ ] bleeding problem  Allergic/Immune: [X] negative [ ] itchy eyes [ ] nasal discharge [ ] hives [ ] angioedema    [X] All other systems negative or otherwise described above.  [ ] Unable to assess ROS because ________.    PAST MEDICAL & SURGICAL HISTORY:  Renal insufficiency    Elevated blood pressure reading in office with diagnosis of hypertension    DVT of leg (deep venous thrombosis)  right calf DVT  2019 pt on Eliquis    CHF (congestive heart failure)    Type II diabetes mellitus    Obese    S/P radiation &gt; 12 weeks  2015-3/2015    S/P chemotherapy, time since greater than 12 weeks  2015-3/2015    Rectal cancer  s/p chemotherapy and  radiation 12 weeks 2015 -3/2015    Hyperlipidemia    Lymphedema of arm  right arm s/p mastectomy    Diabetes mellitus    HTN (hypertension)    Breast CA, right      Rectal cancer    Cancer    Lymphedema of arm  Right arm - right mastectomy    Colon cancer  Radiationh &amp; Oral Chemotherapy    Breast CA, right   s/p mastectomy ,IV Chemotherapy    Obesity (BMI 35.0-39.9 without comorbidity)    Hyperlipidemia    HTN (hypertension)    DM (diabetes mellitus)  Type II    S/P TKR (total knee replacement), right  2017    S/P colon resection      S/P ileostomy  low anterior resection-8/10/15, reversal of ileostomy     History of appendectomy  1953    S/P mastectomy, right      S/P colectomy      History of tonsillectomy    H/O mastectomy, right        MEDICATIONS  (STANDING):  apixaban 2.5 milliGRAM(s) Oral two times a day  buMETAnide 1 milliGRAM(s) Oral daily  dextrose 40% Gel 15 Gram(s) Oral once  dextrose 5%. 1000 milliLiter(s) (50 mL/Hr) IV Continuous <Continuous>  dextrose 5%. 1000 milliLiter(s) (100 mL/Hr) IV Continuous <Continuous>  dextrose 50% Injectable 25 Gram(s) IV Push once  dextrose 50% Injectable 12.5 Gram(s) IV Push once  dextrose 50% Injectable 25 Gram(s) IV Push once  famotidine    Tablet 20 milliGRAM(s) Oral at bedtime  gabapentin 300 milliGRAM(s) Oral two times a day  glucagon  Injectable 1 milliGRAM(s) IntraMuscular once  insulin glargine Injectable (LANTUS) 5 Unit(s) SubCutaneous at bedtime  insulin lispro (ADMELOG) corrective regimen sliding scale   SubCutaneous three times a day before meals  insulin lispro (ADMELOG) corrective regimen sliding scale   SubCutaneous at bedtime  insulin lispro Injectable (ADMELOG) 3 Unit(s) SubCutaneous three times a day before meals  metoprolol succinate  milliGRAM(s) Oral daily  polyethylene glycol 3350 17 Gram(s) Oral daily  senna 1 Tablet(s) Oral daily  simvastatin 40 milliGRAM(s) Oral at bedtime  spironolactone 25 milliGRAM(s) Oral daily    MEDICATIONS  (PRN):  acetaminophen   Tablet .. 650 milliGRAM(s) Oral every 6 hours PRN Mild Pain (1 - 3)  albuterol/ipratropium for Nebulization 3 milliLiter(s) Nebulizer every 6 hours PRN Shortness of Breath and/or Wheezing  oxycodone    5 mG/acetaminophen 325 mG 1 Tablet(s) Oral every 6 hours PRN Moderate Pain (4 - 6)    ICU Vital Signs Last 24 Hrs  T(C): 36.6 (17 Mar 2021 05:41), Max: 36.8 (17 Mar 2021 01:03)  T(F): 97.8 (17 Mar 2021 05:41), Max: 98.2 (17 Mar 2021 01:03)  HR: 73 (17 Mar 2021 05:41) (73 - 93)  BP: 149/81 (17 Mar 2021 05:41) (104/69 - 149/81)  BP(mean): --  ABP: --  ABP(mean): --  RR: 17 (17 Mar 2021 05:41) (16 - 17)  SpO2: 93% (17 Mar 2021 05:41) (92% - 94%)    Daily     Daily Weight in k.2 (16 Mar 2021 08:36)    PHYSICAL EXAM:  GEN: Awake, alert. NAD.   HEENT: NCAT, PERRL, EOMI. Mucosa moist. No JVD.  RESP: CTA b/l  CV: RRR. Normal S1/S2. No m/r/g.  ABD: Soft. NT/ND. BS+  EXT: Warm. No edema, clubbing, or cyanosis.   NEURO: AAOx3. No focal deficits.     LABS:                        10.6   7.57  )-----------( 294      ( 17 Mar 2021 05:56 )             37.0     0317    134<L>  |  92<L>  |  43<H>  ----------------------------<  211<H>  4.4   |  30  |  1.72<H>    Ca    9.9      17 Mar 2021 05:56  Phos  3.4       Mg     2.2                   I&O's Summary    16 Mar 2021 07:01  -  17 Mar 2021 07:00  --------------------------------------------------------  IN: 230 mL / OUT: 1250 mL / NET: -1020 mL      BNP    RADIOLOGY & ADDITIONAL STUDIES:    TELEMETRY: reviewed    EKG: reviewed    ECHO:  < from: Transthoracic Echocardiogram (03.15.21 @ 12:44) >  Conclusions:  Limited TTE to evaluate the right heart.  1. The right ventricle is not well visualized on axis;  grossly mild right ventricular enlargement with normal  right ventricular systolic function.  2. Normal tricuspid valve. Mild-moderate tricuspid  regurgitation.  3. Estimated pulmonary artery systolic pressure equals 56  mm Hg, assuming right atrial pressure equals 8 mm Hg,  consistent with moderate pulmonary pressures.  *** Compared with echocardiogram of 3/11/2021, estimated PA  systolic pressure is lower on today's study.  ------------------------------------------------------------------------  Confirmed on  3/15/2021 - 15:07:02 by Bridgett Edmond M.D.  ------------------------------------------------------------------------    < end of copied text >      < from: Cardiac Cath Lab - Adult (20 @ 15:13) >  CORONARY VESSELS: The coronary circulation is right dominant.  LM:   --  LM: Angiography showed moderate atherosclerosis.  LAD:   --  Proximal LAD: There was a diffuse 60 % stenosis.  CX:   --  Ostial circumflex: There was a 90 % stenosis.  RCA:   --  Ostial RCA: There was a 90 % stenosis.  COMPLICATIONS: There were no complications.  DIAGNOSTIC RECOMMENDATIONS: Consultation with a cardiac surgeon will be  obtained for coronary artery bypass grafting.  Prepared and signed by  Car Salvador M.D.  Signed 2020 16:15:41    < end of copied text >

## 2021-03-17 NOTE — PROGRESS NOTE ADULT - SUBJECTIVE AND OBJECTIVE BOX
PROGRESS NOTE:     CONTACT INFO:  Darien Modi MD (Resident Physician - PGY-1 - Internal Medicine)  [email or web address]  Pager: [phone] (any site) or [phone] ([facility] only)    Patient is a 81y old  Female who presents with a chief complaint of CHF Exacerbation (17 Mar 2021 07:58)    [employer]: [phone]    SUBJECTIVE / OVERNIGHT EVENTS: 2-3s pause on tele overnight while sleeping. Patient seen and evaluated at bedside. States her LE pain continues to to be an issue, but is mildly improved vs. prior. No respiratory distress, no SOB at rest. No fever/chills.  Denies LE swelling, chest pain, palpitations, abdominal pain, nausea/vomiting    ADDITIONAL REVIEW OF SYSTEMS:    MEDICATIONS  (STANDING):  apixaban 2.5 milliGRAM(s) Oral two times a day  buMETAnide 1 milliGRAM(s) Oral two times a day  dextrose 40% Gel 15 Gram(s) Oral once  dextrose 5%. 1000 milliLiter(s) (50 mL/Hr) IV Continuous <Continuous>  dextrose 5%. 1000 milliLiter(s) (100 mL/Hr) IV Continuous <Continuous>  dextrose 50% Injectable 25 Gram(s) IV Push once  dextrose 50% Injectable 12.5 Gram(s) IV Push once  dextrose 50% Injectable 25 Gram(s) IV Push once  famotidine    Tablet 20 milliGRAM(s) Oral at bedtime  gabapentin 300 milliGRAM(s) Oral two times a day  glucagon  Injectable 1 milliGRAM(s) IntraMuscular once  insulin glargine Injectable (LANTUS) 5 Unit(s) SubCutaneous at bedtime  insulin lispro (ADMELOG) corrective regimen sliding scale   SubCutaneous three times a day before meals  insulin lispro (ADMELOG) corrective regimen sliding scale   SubCutaneous at bedtime  insulin lispro Injectable (ADMELOG) 3 Unit(s) SubCutaneous three times a day before meals  metoprolol succinate  milliGRAM(s) Oral daily  polyethylene glycol 3350 17 Gram(s) Oral daily  senna 1 Tablet(s) Oral daily  simvastatin 40 milliGRAM(s) Oral at bedtime  spironolactone 25 milliGRAM(s) Oral daily    MEDICATIONS  (PRN):  acetaminophen   Tablet .. 650 milliGRAM(s) Oral every 6 hours PRN Mild Pain (1 - 3)  albuterol/ipratropium for Nebulization 3 milliLiter(s) Nebulizer every 6 hours PRN Shortness of Breath and/or Wheezing  oxycodone    5 mG/acetaminophen 325 mG 1 Tablet(s) Oral every 6 hours PRN Moderate Pain (4 - 6)      CAPILLARY BLOOD GLUCOSE      POCT Blood Glucose.: 309 mg/dL (17 Mar 2021 11:46)  POCT Blood Glucose.: 201 mg/dL (17 Mar 2021 07:42)  POCT Blood Glucose.: 206 mg/dL (16 Mar 2021 21:35)  POCT Blood Glucose.: 251 mg/dL (16 Mar 2021 17:06)    I&O's Summary    16 Mar 2021 07:01  -  17 Mar 2021 07:00  --------------------------------------------------------  IN: 230 mL / OUT: 1250 mL / NET: -1020 mL    17 Mar 2021 07:01  -  17 Mar 2021 14:39  --------------------------------------------------------  IN: 240 mL / OUT: 0 mL / NET: 240 mL        PHYSICAL EXAM:  Vital Signs Last 24 Hrs  T(C): 36.6 (17 Mar 2021 13:11), Max: 36.8 (17 Mar 2021 01:03)  T(F): 97.9 (17 Mar 2021 13:11), Max: 98.2 (17 Mar 2021 01:03)  HR: 70 (17 Mar 2021 13:11) (70 - 93)  BP: 131/79 (17 Mar 2021 13:11) (104/69 - 149/81)  BP(mean): --  RR: 17 (17 Mar 2021 13:11) (16 - 17)  SpO2: 95% (17 Mar 2021 13:11) (92% - 95%)    GENERAL: NAD; lying in bed w/head of bed elevated to 30*  HEAD: Atraumatic, Normocephalic  EYES: EOMI, PERRLA, conjunctiva and sclera clear  NECK: No JVD. FROM of neck   CHEST/LUNG: No tachypnea. Faint bibasilar crackles; otherwise CTAB. Multiple scars on chest 2/2 mastectomy   HEART: RRR; No murmurs, rubs, or gallops  ABDOMEN: Soft, Nontender, Nondistended; Bowel sounds present. Multiple operative scars on abdomen  EXTREMITIES:  2+ Peripheral Pulses x4. No LE edema. Mildy TTP of BL legs. LLE erythema is resolved. +R knee medial swelling that is boggy and ttp, but w/interval improvement. XR w/o don effusion, but limited eval. R knee is free or erythema and warmth  NERVOUS SYSTEM:  Alert & Oriented X4, Good concentration  PSYCH: Normal Affect. Speaking in Full Sentences; not agitated    LABS:                        10.6   7.57  )-----------( 294      ( 17 Mar 2021 05:56 )             37.0     03-17    134<L>  |  92<L>  |  43<H>  ----------------------------<  211<H>  4.4   |  30  |  1.72<H>    Ca    9.9      17 Mar 2021 05:56  Phos  3.4     03-17  Mg     2.2     03-17                  RADIOLOGY & ADDITIONAL TESTS:  Results Reviewed:   Imaging Personally Reviewed:  Electrocardiogram Personally Reviewed:    COORDINATION OF CARE:  Care Discussed with Consultants/Other Providers [Y/N]:  Prior or Outpatient Records Reviewed [Y/N]:

## 2021-03-17 NOTE — PROGRESS NOTE ADULT - ASSESSMENT
NURIA GARCIA is an 80F HTN, HLD, DM2, CAD s/p CABG, breast CA s/p chemo/mastectomy, rectal CA s/p XRT/resection, RLE DVT on apixaban, and CKD3 p/w ADHFpEF.  she continues to be volume overloaded on exam will continue IV diuresis    #ADHFpEF  #pHTN  -pitting edema in the LE b/l however no pulmonary congestion evident for several days; patient tolerating no IV diuresis x 2 days; c/w PO bumex 1mg qd  -will c/w monitoring Cr; currently downtrending  -recommend compression stocking b/l  -Strict Is/Os, daily wts  -Daily labs; maintain K>4, Mg>2  -C/w spirono 25mg daily  -C/w toprol 150mg daily  -change simvastatin to lipitor 40  -pHTN likely post-capillary in setting of VOL; repeat TTE when euvolemic to r/a pulm pressures    #DVT  -c/w 2.5mg BID    #Will follow    outpatient cardiologist Dr Hagan, however due to the hx of br CA s/p chemo and radiation, please refer for outpt cardio onc fu as well    Nayan Wellington MD  Cardiology Fellow - F1  Text or Call: [phone]  For all New Consults and Questions:  [email or web address]   Login: [email or web address]   NURIA GARCIA is an 80F HTN, HLD, DM2, CAD s/p CABG, breast CA s/p chemo/mastectomy, rectal CA s/p XRT/resection, RLE DVT on apixaban, and CKD3 p/w ADHFpEF.    #ADHFpEF  #pHTN  -pitting edema in the LE b/l however no pulmonary congestion evident for several days; patient tolerating no IV diuresis x 2 days; c/w PO bumex 1mg qd  -will c/w monitoring Cr; currently downtrending  -recommend compression stocking b/l  -Strict Is/Os, daily wts  -Daily labs; maintain K>4, Mg>2  -C/w spirono 25mg daily  -C/w toprol 150mg daily  -change simvastatin to lipitor 40  -pHTN likely post-capillary in setting of VOL; repeat TTE when euvolemic to r/a pulm pressures    #DVT  -c/w 2.5mg BID    #Will follow    outpatient cardiologist Dr Hagan, however due to the hx of br CA s/p chemo and radiation, please refer for outpt cardio onc fu as well    Nayan Wellington MD  Cardiology Fellow - F1  Text or Call: [phone]  For all New Consults and Questions:  [email or web address]   Login: [email or web address]   NURIA GARCIA is an 80F HTN, HLD, DM2, CAD s/p CABG, breast CA s/p chemo/mastectomy, rectal CA s/p XRT/resection, RLE DVT on apixaban, and CKD3 p/w ADHFpEF.    #ADHFpEF  #pHTN  -pitting edema in the LE b/l however no pulmonary congestion evident for several days; patient tolerating no IV diuresis x 2 days; c/w PO bumex 1mg qd  -will c/w monitoring Cr; currently downtrending  -recommend compression stocking b/l  -Strict Is/Os, daily wts  -Daily labs; maintain K>4, Mg>2  -C/w spirono 25mg daily  -C/w toprol 150mg daily  -change simvastatin to lipitor 40  -pHTN likely post-capillary in setting of VOL; repeat TTE when euvolemic to r/a pulm pressures    #DVT  -c/w 2.5mg BID    #Will follow    outpatient cardiologist Dr Hagan, however due to the hx of br CA s/p chemo and radiation, please refer for outpt cardio onc fu as well    At this time cardiology will sign off. Please call back with any questions. Thank you for this consult.     Nayan Wellington MD  Cardiology Fellow - F1  Text or Call: [phone]  For all New Consults and Questions:  [email or web address]   Login: [employer]   NURIA GARCIA is an 81F HTN, HLD, DM2, CAD s/p CABG, breast CA s/p chemo/mastectomy, rectal CA s/p XRT/resection, RLE DVT on apixaban, and CKD3 p/w ADHFpEF.    #ADHFpEF  #pHTN  -pitting edema in the LE b/l however no pulmonary congestion evident for several days; patient tolerating no IV diuresis x 2 days; c/w PO bumex 1mg qd  -will c/w monitoring Cr; currently downtrending  -recommend compression stocking b/l  -Strict Is/Os, daily wts  -Daily labs; maintain K>4, Mg>2  -C/w spirono 25mg daily  -C/w toprol 150mg daily  -change simvastatin to lipitor 40  -pHTN likely post-capillary in setting of VOL; repeat TTE when euvolemic to r/a pulm pressures    #DVT  -c/w 2.5mg BID    #Will follow    outpatient cardiologist Dr Hagan, however due to the hx of br CA s/p chemo and radiation, please refer for outpt cardio onc fu as well    At this time cardiology will sign off. Please call back with any questions. Thank you for this consult.     Nayan Wellington MD  Cardiology Fellow - F1  Text or Call: [phone]  For all New Consults and Questions:  [email or web address]   Login: [employer]   NURIA GARCIA is an 81F HTN, HLD, DM2, CAD s/p CABG, breast CA s/p chemo/mastectomy, rectal CA s/p XRT/resection, RLE DVT on apixaban, and CKD3 p/w ADHFpEF.    #ADHFpEF  #pHTN  -pitting edema in the LE b/l however no pulmonary congestion evident for several days; patient tolerating no IV diuresis x 2 days; c/w PO bumex 1mg qd  -will c/w monitoring Cr; currently downtrending  -recommend compression stocking b/l  -Strict Is/Os, daily wts  -Daily labs; maintain K>4, Mg>2  -C/w spirono 25mg daily  -C/w toprol 150mg daily  -change simvastatin to lipitor 40  -pHTN likely post-capillary in setting of VOL; repeat TTE when euvolemic to r/a pulm pressures    #DVT  -c/w 2.5mg BID    #Will follow    outpatient cardiologist Dr Hagan, however due to the hx of br CA s/p chemo and radiation, please refer for outpt cardio onc fu as well    Nayan Wellington MD  Cardiology Fellow - F1  Text or Call: [phone]  For all New Consults and Questions:  [email or web address]   Login: [email or web address]

## 2021-03-17 NOTE — PROGRESS NOTE ADULT - PROBLEM SELECTOR PLAN 1
Known Stage II Diastolic CHF (EF 60% 11/20/20) HJR- but Clinically Overloaded  -CXR showing pulmonary edema b/l; BNP 2810, EKG grossly normal, low voltage   -starting bumex 1mg PO qd, c/w metoprolol 150mg qd  -s/p aggressive IV diuresis; euvolemic on exam  -Daily weights, fluid 1.5L restriction, salt restriction  -Echo 3/11 EF 50%, w/severe pulmHTN, but likely 2/2 severe volume overload  -Repeat echo 3/14 w/moderate pulmHTN Known Stage II Diastolic CHF (EF 60% 11/20/20) HJR- but Clinically Overloaded  -CXR showing pulmonary edema b/l; BNP 2810, EKG grossly normal, low voltage   -starting bumex 1mg PO BID, c/w metoprolol 150mg qd  -s/p aggressive IV diuresis; euvolemic on exam  -Daily weights, fluid 1.5L restriction, salt restriction  -Echo 3/11 EF 50%, w/severe pulmHTN, but likely 2/2 severe volume overload  -Repeat echo 3/14 w/moderate pulmHTN

## 2021-03-17 NOTE — PROVIDER CONTACT NOTE (OTHER) - ACTION/TREATMENT ORDERED:
MD notified. continue to monitor.
Patient has history of delirium as per MD. Reschedule medications if patient is non compliant on re-assessment and orientation. Continue to monitor.
Provider notified and will continue to monitor
will continue to monitor

## 2021-03-17 NOTE — PROVIDER CONTACT NOTE (OTHER) - BACKGROUND
Patient's baseline creatinine close to 1, creatinine on 12/04/2019 was 1 8, -creatinine today improved to 1 24  -possible etiologies include prerenal secondary to ACE-inhibitor, episodes of hypotension, anemia  -nephrology follow-up appreciated, continue to avoid Ace inhibitors at this point,   -will monitor renal function, avoid nephrotoxin
Patient admitted for heart failure
Pt admitted for HF
pt admitted for heart failure
Patient admitted with heart failure.

## 2021-03-17 NOTE — PROVIDER CONTACT NOTE (OTHER) - REASON
Patient experiencing altered mental status.
PAT x 10sec HR up to 140s
event on tele
Pt had PAT x 10 sec 's

## 2021-03-17 NOTE — DISCHARGE NOTE PROVIDER - HOSPITAL COURSE
79 y/o F hx/ HTN, HLD, DM2 not on insulin, CAD s/p 2-vessel CABG 11/9/2020, Stage II Diastolic CHF (EF 60% 11/20/20), Breast cancer s/p right partial mastectomy and chemo (1989), Rectal Ca s/p resection and radiation 2015, R calf DVT on eliquis, anemia, CKDIII (Cr: 1.59 baseline) recent [facility] admission 2/4-2/8 for CHF exacerbation p/w increasing weight/swelling, sob, and difficulty ambulating x2 weeks likely 2/2 CHF exacerbation with no known inciting event. 3/11: admitted o/n; diuresing well on IV lasix. Subjective improvement, but mild tachypnea. Showed rapid initial improvement w/aggressive IV duriresis. LE swelling improved as did dyspnea. LLE swelling asymmetry prompted DVT study that was negative for new clot and remonstrated known R chronic DVT. TTE w/severe pulmHTN, pulmonology and cardiology consulted w/recs to c/w diuresis and redo the TTE when volume status improved. Pt experienced significant LE pain during admission; clinical presentation initially c/f possible LLE cellulitis w/pain confounded by edema. LLE redness subsided rapidly w/diuresis and improvement   of edema, but pain continued and became more clinically c/w neuropathy and claudication. Neuropathy presented challenges in symptom management given requirement of renal dosing of neuropathic meds ISO TAO. 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 Pt continued on IV lasix for first several days with interval improvement. Tele w/o significant events beyond episodic tachycardic to 110-120s likely ISO poor pain control. Oxy added in attempt to mitigate LE pain, but met with mediocore results. Progressive SCr rise during IV diuresis prompted reduction of lasix frequency to qd when pt was clinically euvolemic. TTE repeated on 3/15 w/improvement of PulmHTN; now only moderate and c/w prior known baselines. SCr improving w/reduction of IV lasix frequency. Pt remained euvolemic and was transitioned to PO bumex 1mg on 3/16, but subsequently had minor increase in weight and clinical volume status on 3/17 that resulted in the decision to increase PO bumex frequency to BID. Pt also had some R knee swelling c/f effusion; XR ordered w/o gross effusion, but study limited due to angles. No additional interventions or images obtained as pt showed improvement of the knee pain and swelling the following day....   81 y/o F hx/ HTN, HLD, DM2 not on insulin, CAD s/p 2-vessel CABG 11/9/2020, Stage II Diastolic CHF (EF 60% 11/20/20), Breast cancer s/p right partial mastectomy and chemo (1989), Rectal Ca s/p resection and radiation 2015, R calf DVT on eliquis, anemia, CKDIII (Cr: 1.59 baseline) recent [facility] admission 2/4-2/8 for CHF exacerbation p/w increasing weight/swelling, sob, and difficulty ambulating x2 weeks likely 2/2 CHF exacerbation with no known inciting event. 3/11: admitted o/n; diuresing well on IV lasix. Subjective improvement, but mild tachypnea. Showed rapid initial improvement w/aggressive IV duriresis. LE swelling improved as did dyspnea. LLE swelling asymmetry prompted DVT study that was negative for new clot and remonstrated known R chronic DVT. TTE w/severe pulmHTN, pulmonology and cardiology consulted w/recs to c/w diuresis and redo the TTE when volume status improved. Pt experienced significant LE pain during admission; clinical presentation initially c/f possible LLE cellulitis w/pain confounded by edema. LLE redness subsided rapidly w/diuresis and improvement   of edema, but pain continued and became more clinically c/w neuropathy and claudication. Neuropathy presented challenges in symptom management given requirement of renal dosing of neuropathic meds ISO TAO. 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 Pt continued on IV lasix for first several days with interval improvement. Tele w/o significant events beyond episodic tachycardic to 110-120s likely ISO poor pain control. Oxy added in attempt to mitigate LE pain, but met with mediocore results. Progressive SCr rise during IV diuresis prompted reduction of lasix frequency to qd when pt was clinically euvolemic. TTE repeated on 3/15 w/improvement of PulmHTN; now only moderate and c/w prior known baselines. SCr improving w/reduction of IV lasix frequency. Pt remained euvolemic and was transitioned to PO bumex 1mg on 3/16, but subsequently had minor increase in weight and clinical volume status on 3/17 that resulted in the decision to increase PO bumex frequency to BID. Pt also had some R knee swelling c/f effusion; XR ordered w/o gross effusion, but study limited due to angles. No additional interventions or images obtained as pt showed improvement of the knee pain and swelling the following day. Pt seen 3/18 in AM and requesting to leave when stable. Labs and hospital course reviewed and pt stable on current BID bumex diuresis w/clinical exam showing euvolemia. Pt stating improvement of LE pain. Stable for d/c; star pt paperwork completed. Pt's outpatient cardiology specialist notified and will be contacting the pt directly to arrange her appointment.

## 2021-03-17 NOTE — PROGRESS NOTE ADULT - SUBJECTIVE AND OBJECTIVE BOX
Overnight events noted   VITAL: T(C): , Max: 36.8 (03-17-21 @ 01:03) T(F): , Max: 98.2 (03-17-21 @ 01:03) HR: 70 (03-17-21 @ 13:11) BP: 131/79 (03-17-21 @ 13:11) BP(mean): -- RR: 17 (03-17-21 @ 13:11) SpO2: 95% (03-17-21 @ 13:11)   PHYSICAL EXAM: Constitutional: NAD, Alert; obese HEENT: NCAT, MMM Neck: Supple, No JVD Respiratory: CTA-b/l Cardiovascular: RRR s1s2, no m/r/g Gastrointestinal: BS+, soft, NT/ND Extremities: 1-2+ b/l LE edema Neurological: no focal deficits; strength grossly intact Back: no CVAT b/l Skin: (+)confluent erythema left leg  LABS:                      10.6  7.57  )-----------( 294      ( 17 Mar 2021 05:56 )            37.0   Na(134)/K(4.4)/Cl(92)/HCO3(30)/BUN(43)/Cr(1.72)Glu(211)/Ca(9.9)/Mg(2.2)/PO4(3.4)    03-17 @ 05:56 Na(137)/K(4.5)/Cl(92)/HCO3(35)/BUN(44)/Cr(1.89)Glu(221)/Ca(9.8)/Mg(2.1)/PO4(3.4)    03-16 @ 05:44 Na(137)/K(4.1)/Cl(91)/HCO3(36)/BUN(40)/Cr(1.93)Glu(197)/Ca(9.7)/Mg(2.0)/PO4(3.5)    03-15 @ 06:19   IMPRESSION: 81F w/ HTN, DM2, CAD-CABG, HFpEF, breast CA, rectal CA, and CKD3-4, 3/10/21 a/w acute on chronic HFpEF  (1)CKD - stage 3-4, with minimal proteinuria - largely due to hypertension; potentially a component as well from diabetic nephropathy. Fluctuating numbers based on hemodynamic status  (2)Acid-base - contraction alkalosis vs chronic primary respiratory acidosis - HCO3 normalizing as of today  (3)CV - acute on chronic HFpEF - resolving/resolved CHF flare   RECOMMEND: (1)Diuretics as ordered (2)Dose new meds for GFR 30ml/min (3)Can f/u with my partner Dr. Igor Kan 1-2 months after discharge       Ignacio Morataya MD [facility] Office: [phone] Cell: [phone]        (+)right knee pain; no sob   VITAL: T(C): , Max: 36.8 (03-17-21 @ 01:03) T(F): , Max: 98.2 (03-17-21 @ 01:03) HR: 70 (03-17-21 @ 13:11) BP: 131/79 (03-17-21 @ 13:11) BP(mean): -- RR: 17 (03-17-21 @ 13:11) SpO2: 95% (03-17-21 @ 13:11)   PHYSICAL EXAM: Constitutional: NAD, Alert; obese HEENT: NCAT, MMM Neck: Supple, No JVD Respiratory: CTA-b/l Cardiovascular: RRR s1s2, no m/r/g Gastrointestinal: BS+, soft, NT/ND Extremities: 1-2+ b/l LE edema Neurological: no focal deficits; strength grossly intact Back: no CVAT b/l Skin: (+)confluent erythema left leg  LABS:                      10.6  7.57  )-----------( 294      ( 17 Mar 2021 05:56 )            37.0   Na(134)/K(4.4)/Cl(92)/HCO3(30)/BUN(43)/Cr(1.72)Glu(211)/Ca(9.9)/Mg(2.2)/PO4(3.4)    03-17 @ 05:56 Na(137)/K(4.5)/Cl(92)/HCO3(35)/BUN(44)/Cr(1.89)Glu(221)/Ca(9.8)/Mg(2.1)/PO4(3.4)    03-16 @ 05:44 Na(137)/K(4.1)/Cl(91)/HCO3(36)/BUN(40)/Cr(1.93)Glu(197)/Ca(9.7)/Mg(2.0)/PO4(3.5)    03-15 @ 06:19   IMPRESSION: 81F w/ HTN, DM2, CAD-CABG, HFpEF, breast CA, rectal CA, and CKD3-4, 3/10/21 a/w acute on chronic HFpEF  (1)CKD - stage 3-4, with minimal proteinuria - largely due to hypertension; potentially a component as well from diabetic nephropathy. Fluctuating numbers based on hemodynamic status  (2)Acid-base - contraction alkalosis vs chronic primary respiratory acidosis - HCO3 normalizing as of today  (3)CV - acute on chronic HFpEF - resolving/resolved CHF flare   RECOMMEND: (1)Diuretics as ordered (2)Dose new meds for GFR 30ml/min (3)Can f/u with my partner Dr. Igor Kan 1-2 months after discharge       Ignacio Morataya MD [facility] Office: [phone] Cell: [phone]

## 2021-03-17 NOTE — PROGRESS NOTE ADULT - ASSESSMENT
79 y/o F hx/ HTN, HLD, DM2 not on insulin, CAD s/p 2-vessel CABG 11/9/2020, Stage II Diastolic CHF (EF 60% 11/20/20), Breast cancer s/p right partial mastectomy and chemo (1989), Rectal Ca s/p resection and radiation 2015, R calf DVT on eliquis, anemia, CKDIII (Cr: 1.59 baseline) recent [facility] admission 2/4-2/8 for CHF exacerbation p/w increasing weight/swelling, sob, and difficulty ambulating x2 weeks likely 2/2 CHF exacerbation with no known inciting event.     -Initial echo w/severe pulmHTN likely ISO acute and severe volume overload; repeat w/moderate pulmHTN  -Euvolemic on exam  -Neuropathic LE pain continues  -Possible new R knee effusion; to be evaluated via XR. Of note; known chronic R DVT may be confounding factor if no effusion noted  -SCr improving w/reduction of diuresis on 3/16   -Transitioning to PO Bumex  81 y/o F hx/ HTN, HLD, DM2 not on insulin, CAD s/p 2-vessel CABG 11/9/2020, Stage II Diastolic CHF (EF 60% 11/20/20), Breast cancer s/p right partial mastectomy and chemo (1989), Rectal Ca s/p resection and radiation 2015, R calf DVT on eliquis, anemia, CKDIII (Cr: 1.59 baseline) recent [facility] admission 2/4-2/8 for CHF exacerbation p/w increasing weight/swelling, sob, and difficulty ambulating x2 weeks likely 2/2 CHF exacerbation with no known inciting event    -Initial echo w/severe pulmHTN likely ISO acute and severe volume overload; repeat w/moderate pulmHTN  -Mild signs of volume overload on exam; small increase in daily weight. Will increase diuretic frequency to BID  -Neuropathic LE pain continues; will speak w/pharmacy regarding renal dosing and possibility to increase neuropathy meds  -R knee swelling w/o erythema; xray grossly negative, but limited study due to oblique view. Known chronic R DVT may be confounding factor if no effusion noted  -SCr improving w/reduction of diuresis on 3/16, 3/17   -Transitioning to PO Bumex 1mg BID

## 2021-03-17 NOTE — DISCHARGE NOTE NURSING/CASE MANAGEMENT/SOCIAL WORK - PATIENT PORTAL LINK FT
You can access the FollowMyHealth Patient Portal offered by [facility] by registering at the following website: [email or web address]. By joining [employer]’s FollowMyHealth portal, you will also be able to view your health information using other applications (apps) compatible with our system.

## 2021-03-17 NOTE — DISCHARGE NOTE PROVIDER - NSDCCPCAREPLAN_GEN_ALL_CORE_FT
PRINCIPAL DISCHARGE DIAGNOSIS  Diagnosis: Acute heart failure, unspecified heart failure type  Assessment and Plan of Treatment: You came to the hospital because you were short of breath. Your shortness of breath was caused by "acute decompensated heart failure" that resulted in fluid retention in your body that was ultimate was deposited in your lungs (pulmonary edema/pleural effusions) and legs (lower extremity swelling/edema). We started gave you diuretics (water pills) to removal of fluid and improve your symptoms. We also consulted our Heart Failure team to optimize your diuretic (water pill) regimen and assist in removal of excess fluid from your legs/lungs and restore your fluid balance. These efforts improved your breathing and resolved your leg swelling. To prevent additional episodes of decompensated heart failure, it is important that you continue to take your diuretic (water pill) and other heart failure medication as directed. Restrict your fluid intake to 1.2L a day and follow up with your cardiologist. An appointment for you to follow up with your cardiologist has been made for XXX[facility]. If you cannot make this appointment, please contact their office immediately to schedule an appointment within 1 week of discharge from the hospital to discuss ongoing cardiac care  PLEASE RETURN TO THE EMERGENCY DEPARTMENT IF: You have signs/symptoms of decompensated heart failure including, but not limited to: significant leg swelling, shortness of breath, persistent or severe chest pain or pressure, palpitations, persistent or severely elevated heart rate, nausea, vomiting, significant abdominal pain, significantly high or low blood pressure, dizziness, worsening fatigue, an inability to lie flat, or any other sign/symptom you feel warrants immediate evaluation by a medical professional.      SECONDARY DISCHARGE DIAGNOSES  Diagnosis: Chronic leg pain  Assessment and Plan of Treatment:      PRINCIPAL DISCHARGE DIAGNOSIS  Diagnosis: Acute heart failure, unspecified heart failure type  Assessment and Plan of Treatment: You came to the hospital because you were short of breath. Your shortness of breath was caused by "acute decompensated heart failure" that resulted in fluid retention in your body that was ultimate was deposited in your lungs (pulmonary edema/pleural effusions) and legs (lower extremity swelling/edema). We started gave you diuretics (water pills) to removal of fluid and improve your symptoms. We also consulted our Heart Failure team to optimize your diuretic (water pill) regimen and assist in removal of excess fluid from your legs/lungs and restore your fluid balance. These efforts improved your breathing and resolved your leg swelling. To prevent additional episodes of decompensated heart failure, it is important that you continue to take your diuretic (water pill) and other heart failure medication as directed. Restrict your fluid intake to 1.2L a day and follow up with your cardiologist. An appointment for you to follow up with your cardiologist has been made for early May. You should contact your cardiologist to inquire about earlier availability. If you cannot make this appointment, please contact their office immediately to schedule an appointment within 1 week of discharge from the hospital to discuss ongoing cardiac care  PLEASE RETURN TO THE EMERGENCY DEPARTMENT IF: You have signs/symptoms of decompensated heart failure including, but not limited to: significant leg swelling, shortness of breath, persistent or severe chest pain or pressure, palpitations, persistent or severely elevated heart rate, nausea, vomiting, significant abdominal pain, significantly high or low blood pressure, dizziness, worsening fatigue, an inability to lie flat, or any other sign/symptom you feel warrants immediate evaluation by a medical professional.      SECONDARY DISCHARGE DIAGNOSES  Diagnosis: Chronic leg pain  Assessment and Plan of Treatment: You have expereinced significant leg pain during your admission. Your leg pain is likely multifactorial and caused by a combination of your known neuropathy, arthritis, need for hip surgery, and leg swelling due to your heart failure. We provided a pain control regimen, diuretics to reduce the swelling, compressions stockings, and performed an x-ray of your knee (see procedures for results). While you are still having knee pain, it has improved from our efforts and you are safe for discharge. You should continue to follow up with your orthopedic surgeon to discuss further management and the need for surgery regarding your right hip issues.

## 2021-03-17 NOTE — PROGRESS NOTE ADULT - PROBLEM SELECTOR PLAN 8
Triple vessel disease s/p 2 vessel CABG   - c/w Simvastatin 40mg Triple vessel disease s/p 2 vessel CABG   -s/p Simvastatin 40mg  -starting atoravastatin 40qhs

## 2021-03-17 NOTE — PROVIDER CONTACT NOTE (OTHER) - ASSESSMENT
Temp=98.5F, HR=80, O2=93%, UC=488/62
VSS, Pt asymptomatic, no complaints of SOB, chest pain, dizziness, or palpitations.
pt A&OX4, vss. pt is asymptomatic. denies sob, cp, palpitations. SR on tele HR 90s
Patient experiencing altered mental status. Patient aggressive and refuses care. VSS. No signs of distress.

## 2021-03-17 NOTE — CHART NOTE - NSCHARTNOTEFT_GEN_A_CORE
Nutrition Follow Up Note  Patient seen for: length of stay follow up. Chart reviewed and events noted.     Pt is a 81 y/o F Hx of HTN, HLD, T2DM, CAD s/p 2-vessel CABG 2020, Stage II Diastolic CHF (EF 60% 20), Breast cancer s/p right partial mastectomy and chemo (), Rectal Ca s/p resection and radiation , R calf DVT on eliquis, anemia, CKDIII. Recent [facility] admission - for CHF exacerbation. Presents with increasing weight/swelling, sob, and difficulty ambulating x2 weeks likely 2/2 CHF exacerbation with no known inciting event. Repeat echo 3/14 w/moderate pulm HTN. Pt with Right Knee Swelling.     Source:   Medical record, pt, and pt's daughter via phone (Citizen of the [place] speaking,  ID#[id number])     Diet :   DASH/TLC: Sodium & Cholesterol Restricted  Consistent Carbohydrate {Evening Snack}   1500mL Fluid Restriction     Patient reports: that appetite and PO intake have been good. Denies recent N/V, constipation, or diarrhea. Last BM 3/16. RD reinforced heart failure nutrition education with emphasis on need to limit Na intake. Per conversation with daughter, family tries to watch pt's intake and keep her on a "healthy" diet, but pt will occasionally indulge in "unhealthy" foods. Moving forward, daughter reports will try to be more diligent regarding pt's diet. Fluid restriction education as documented below.      PO intake :  >75% meals     Source for PO intake:  Pt    Daily Weight in k.3 (-), 83.5 (-), 89.4 (-)  Dosing wt 86 kG  Wt continues to trend down 2/2 fluid losses as pt being diuresed on Bumex. Current wt 179.2 lbs (3/17) consistent with reported UBW of 178 lbs. RD will continue to trend as new wts available/able.     Pertinent Medications: MEDICATIONS  (STANDING):  apixaban 2.5 milliGRAM(s) Oral two times a day  buMETAnide 1 milliGRAM(s) Oral two times a day  dextrose 40% Gel 15 Gram(s) Oral once  dextrose 5%. 1000 milliLiter(s) (50 mL/Hr) IV Continuous <Continuous>  dextrose 5%. 1000 milliLiter(s) (100 mL/Hr) IV Continuous <Continuous>  dextrose 50% Injectable 25 Gram(s) IV Push once  dextrose 50% Injectable 12.5 Gram(s) IV Push once  dextrose 50% Injectable 25 Gram(s) IV Push once  famotidine    Tablet 20 milliGRAM(s) Oral at bedtime  gabapentin 300 milliGRAM(s) Oral two times a day  glucagon  Injectable 1 milliGRAM(s) IntraMuscular once  insulin glargine Injectable (LANTUS) 5 Unit(s) SubCutaneous at bedtime  insulin lispro (ADMELOG) corrective regimen sliding scale   SubCutaneous three times a day before meals  insulin lispro (ADMELOG) corrective regimen sliding scale   SubCutaneous at bedtime  insulin lispro Injectable (ADMELOG) 3 Unit(s) SubCutaneous three times a day before meals  metoprolol succinate  milliGRAM(s) Oral daily  polyethylene glycol 3350 17 Gram(s) Oral daily  senna 1 Tablet(s) Oral daily  simvastatin 40 milliGRAM(s) Oral at bedtime  spironolactone 25 milliGRAM(s) Oral daily    MEDICATIONS  (PRN):  acetaminophen   Tablet .. 650 milliGRAM(s) Oral every 6 hours PRN Mild Pain (1 - 3)  albuterol/ipratropium for Nebulization 3 milliLiter(s) Nebulizer every 6 hours PRN Shortness of Breath and/or Wheezing  oxycodone    5 mG/acetaminophen 325 mG 1 Tablet(s) Oral every 6 hours PRN Moderate Pain (4 - 6)    Pertinent Labs:  @ 05:56: Na 134<L>, BUN 43<H>, Cr 1.72<H>, <H>, K+ 4.4, Phos 3.4, Mg 2.2    Finger Sticks:  POCT Blood Glucose.: 309 mg/dL ( @ 11:46)  POCT Blood Glucose.: 201 mg/dL ( @ 07:42)  POCT Blood Glucose.: 206 mg/dL ( @ 21:35)  POCT Blood Glucose.: 251 mg/dL ( @ 17:06)  -Elevated blood glucose noted, on consistent carbohydrate diet.     Skin per nursing documentation: No pressure injuries noted.  Edema per nursing documentation: +1 Stanley. leg +2 R arm    Estimated Needs:   [x] no change since previous assessment  Upper IBW used for protein + energy needs (52.3 kG)  Estimated Energy Needs: (25-30 kcals/kG) 5778-1284 kcals  Estimated Protein Needs: (1.1-1.3 gm/kG) 58-68 gm  Fluid needs deferred to provider    Previous Nutrition Diagnosis: Unintended wt gain  Nutrition Diagnosis is: resolved, pt     New Nutrition Diagnosis: Food and Nutrition related knowledge deficit  Related to: lack of prior exposure to nutrition education   As evidenced by: pt + daughter reporting     Interventions: Pt and pt's daughter educated on fluid restrictions and foods that count towards fluid restriction i.e. soups, popsicles, ice-cream. All questions answered at this time. Made aware RD to remain available.     Recommend  1) Continue current DASH/TLC: Sodium & Cholesterol Restricted; Consistent Carbohydrate {Evening Snack}; 1500mL Fluid Restriction diet. Fluid needs deferred to provider.  2) Reinforce nutrition education as needed.    Monitoring and Evaluation:   Continue to monitor Nutritional intake, Tolerance to diet prescription, weights, labs, skin integrity    RD remains available upon request and will follow up per protocol  Estefanía Dent, MS, RD, CDN Pager #[phone] Nutrition Follow Up Note  Patient seen for: length of stay follow up. Chart reviewed and events noted.     Pt is a 81 y/o F Hx of HTN, HLD, T2DM, CAD s/p 2-vessel CABG 2020, Stage II Diastolic CHF (EF 60% 20), Breast cancer s/p right partial mastectomy and chemo (), Rectal Ca s/p resection and radiation , R calf DVT on eliquis, anemia, CKDIII. Recent [facility] admission - for CHF exacerbation. Presents with increasing weight/swelling, sob, and difficulty ambulating x2 weeks likely 2/2 CHF exacerbation with no known inciting event. Repeat echo 3/14 w/moderate pulm HTN. Pt with Right Knee Swelling.     Source:   Medical record, pt, and pt's daughter via phone ([place] speaking,  ID#[id number])     Diet :   DASH/TLC: Sodium & Cholesterol Restricted  Consistent Carbohydrate {Evening Snack}   1500mL Fluid Restriction     Patient reports: that appetite and PO intake have been good. Denies recent N/V, constipation, or diarrhea. Last BM 3/16. RD reinforced heart failure nutrition education with emphasis on need to limit Na intake. Per conversation with daughter, family tries to watch pt's intake and keep her on a "healthy" diet, but pt will occasionally indulge in "unhealthy" foods. Moving forward, daughter reports will try to be more diligent regarding pt's diet. Fluid restriction education as documented below.      PO intake :  >75% meals     Source for PO intake:  Pt    Daily Weight in k.3 (-), 83.5 (-), 89.4 (-)  Dosing wt 86 kG  Wt continues to trend down 2/2 fluid losses as pt being diuresed on Bumex. Current wt 179.2 lbs (3/17) consistent with reported UBW of 178 lbs. RD will continue to trend as new wts available/able.     Pertinent Medications: MEDICATIONS  (STANDING):  apixaban 2.5 milliGRAM(s) Oral two times a day  buMETAnide 1 milliGRAM(s) Oral two times a day  dextrose 40% Gel 15 Gram(s) Oral once  dextrose 5%. 1000 milliLiter(s) (50 mL/Hr) IV Continuous <Continuous>  dextrose 5%. 1000 milliLiter(s) (100 mL/Hr) IV Continuous <Continuous>  dextrose 50% Injectable 25 Gram(s) IV Push once  dextrose 50% Injectable 12.5 Gram(s) IV Push once  dextrose 50% Injectable 25 Gram(s) IV Push once  famotidine    Tablet 20 milliGRAM(s) Oral at bedtime  gabapentin 300 milliGRAM(s) Oral two times a day  glucagon  Injectable 1 milliGRAM(s) IntraMuscular once  insulin glargine Injectable (LANTUS) 5 Unit(s) SubCutaneous at bedtime  insulin lispro (ADMELOG) corrective regimen sliding scale   SubCutaneous three times a day before meals  insulin lispro (ADMELOG) corrective regimen sliding scale   SubCutaneous at bedtime  insulin lispro Injectable (ADMELOG) 3 Unit(s) SubCutaneous three times a day before meals  metoprolol succinate  milliGRAM(s) Oral daily  polyethylene glycol 3350 17 Gram(s) Oral daily  senna 1 Tablet(s) Oral daily  simvastatin 40 milliGRAM(s) Oral at bedtime  spironolactone 25 milliGRAM(s) Oral daily    MEDICATIONS  (PRN):  acetaminophen   Tablet .. 650 milliGRAM(s) Oral every 6 hours PRN Mild Pain (1 - 3)  albuterol/ipratropium for Nebulization 3 milliLiter(s) Nebulizer every 6 hours PRN Shortness of Breath and/or Wheezing  oxycodone    5 mG/acetaminophen 325 mG 1 Tablet(s) Oral every 6 hours PRN Moderate Pain (4 - 6)    Pertinent Labs:  @ 05:56: Na 134<L>, BUN 43<H>, Cr 1.72<H>, <H>, K+ 4.4, Phos 3.4, Mg 2.2    Finger Sticks:  POCT Blood Glucose.: 309 mg/dL ( @ 11:46)  POCT Blood Glucose.: 201 mg/dL ( @ 07:42)  POCT Blood Glucose.: 206 mg/dL ( @ 21:35)  POCT Blood Glucose.: 251 mg/dL ( @ 17:06)  -Elevated blood glucose noted, on consistent carbohydrate diet.     Skin per nursing documentation: No pressure injuries noted.  Edema per nursing documentation: +1 Stanley. leg +2 R arm    Estimated Needs:   [x] no change since previous assessment  Upper IBW used for protein + energy needs (52.3 kG)  Estimated Energy Needs: (25-30 kcals/kG) 1436-1548 kcals  Estimated Protein Needs: (1.1-1.3 gm/kG) 58-68 gm  Fluid needs deferred to provider    Previous Nutrition Diagnosis: Unintended wt gain  Nutrition Diagnosis is: resolved, pt current wt 179.2 lbs (3/17) consistent with reported UBW of 178 lbs.     New Nutrition Diagnosis: Food and Nutrition related knowledge deficit  Related to: lack of prior exposure to nutrition education   As evidenced by: pt + daughter reporting     Interventions: Pt and pt's daughter educated on fluid restrictions and foods that count towards fluid restriction i.e. soups, popsicles, ice-cream. All questions answered at this time. Made aware RD to remain available.     Recommend  1) Continue current DASH/TLC: Sodium & Cholesterol Restricted; Consistent Carbohydrate {Evening Snack}; 1500mL Fluid Restriction diet. Fluid needs deferred to provider.  2) Reinforce nutrition education as needed.    Monitoring and Evaluation:   Continue to monitor Nutritional intake, Tolerance to diet prescription, weights, labs, skin integrity    RD remains available upon request and will follow up per protocol  Estefanía Dent, MS, RD, CDN Pager #[phone]

## 2021-03-18 VITALS
TEMPERATURE: 98 F | SYSTOLIC BLOOD PRESSURE: 143 MMHG | DIASTOLIC BLOOD PRESSURE: 76 MMHG | HEART RATE: 73 BPM | OXYGEN SATURATION: 96 % | RESPIRATION RATE: 17 BRPM

## 2021-03-18 LAB
ANION GAP SERPL CALC-SCNC: 12 MMOL/L — SIGNIFICANT CHANGE UP (ref 5–17)
BUN SERPL-MCNC: 39 MG/DL — HIGH (ref 7–23)
CALCIUM SERPL-MCNC: 9.9 MG/DL — SIGNIFICANT CHANGE UP (ref 8.4–10.5)
CHLORIDE SERPL-SCNC: 92 MMOL/L — LOW (ref 96–108)
CO2 SERPL-SCNC: 29 MMOL/L — SIGNIFICANT CHANGE UP (ref 22–31)
CREAT SERPL-MCNC: 1.69 MG/DL — HIGH (ref 0.5–1.3)
GLUCOSE BLDC GLUCOMTR-MCNC: 189 MG/DL — HIGH (ref 70–99)
GLUCOSE BLDC GLUCOMTR-MCNC: 233 MG/DL — HIGH (ref 70–99)
GLUCOSE SERPL-MCNC: 200 MG/DL — HIGH (ref 70–99)
HCT VFR BLD CALC: 36.8 % — SIGNIFICANT CHANGE UP (ref 34.5–45)
HGB BLD-MCNC: 10.9 G/DL — LOW (ref 11.5–15.5)
MAGNESIUM SERPL-MCNC: 2.2 MG/DL — SIGNIFICANT CHANGE UP (ref 1.6–2.6)
MCHC RBC-ENTMCNC: 25 PG — LOW (ref 27–34)
MCHC RBC-ENTMCNC: 29.6 GM/DL — LOW (ref 32–36)
MCV RBC AUTO: 84.4 FL — SIGNIFICANT CHANGE UP (ref 80–100)
NRBC # BLD: 0 /100 WBCS — SIGNIFICANT CHANGE UP (ref 0–0)
PHOSPHATE SERPL-MCNC: 3.1 MG/DL — SIGNIFICANT CHANGE UP (ref 2.5–4.5)
PLATELET # BLD AUTO: 315 K/UL — SIGNIFICANT CHANGE UP (ref 150–400)
POTASSIUM SERPL-MCNC: 4.4 MMOL/L — SIGNIFICANT CHANGE UP (ref 3.5–5.3)
POTASSIUM SERPL-SCNC: 4.4 MMOL/L — SIGNIFICANT CHANGE UP (ref 3.5–5.3)
RBC # BLD: 4.36 M/UL — SIGNIFICANT CHANGE UP (ref 3.8–5.2)
RBC # FLD: 17.5 % — HIGH (ref 10.3–14.5)
SODIUM SERPL-SCNC: 133 MMOL/L — LOW (ref 135–145)
WBC # BLD: 9.59 K/UL — SIGNIFICANT CHANGE UP (ref 3.8–10.5)
WBC # FLD AUTO: 9.59 K/UL — SIGNIFICANT CHANGE UP (ref 3.8–10.5)

## 2021-03-18 PROCEDURE — 83605 ASSAY OF LACTIC ACID: CPT

## 2021-03-18 PROCEDURE — 85018 HEMOGLOBIN: CPT

## 2021-03-18 PROCEDURE — 99285 EMERGENCY DEPT VISIT HI MDM: CPT | Mod: 25

## 2021-03-18 PROCEDURE — 85025 COMPLETE CBC W/AUTO DIFF WBC: CPT

## 2021-03-18 PROCEDURE — 97116 GAIT TRAINING THERAPY: CPT

## 2021-03-18 PROCEDURE — 84132 ASSAY OF SERUM POTASSIUM: CPT

## 2021-03-18 PROCEDURE — 93306 TTE W/DOPPLER COMPLETE: CPT

## 2021-03-18 PROCEDURE — 84100 ASSAY OF PHOSPHORUS: CPT

## 2021-03-18 PROCEDURE — 99239 HOSP IP/OBS DSCHRG MGMT >30: CPT

## 2021-03-18 PROCEDURE — U0005: CPT

## 2021-03-18 PROCEDURE — 82947 ASSAY GLUCOSE BLOOD QUANT: CPT

## 2021-03-18 PROCEDURE — 85730 THROMBOPLASTIN TIME PARTIAL: CPT

## 2021-03-18 PROCEDURE — 93970 EXTREMITY STUDY: CPT

## 2021-03-18 PROCEDURE — 83735 ASSAY OF MAGNESIUM: CPT

## 2021-03-18 PROCEDURE — 82962 GLUCOSE BLOOD TEST: CPT

## 2021-03-18 PROCEDURE — 82330 ASSAY OF CALCIUM: CPT

## 2021-03-18 PROCEDURE — 82435 ASSAY OF BLOOD CHLORIDE: CPT

## 2021-03-18 PROCEDURE — 84295 ASSAY OF SERUM SODIUM: CPT

## 2021-03-18 PROCEDURE — 97530 THERAPEUTIC ACTIVITIES: CPT

## 2021-03-18 PROCEDURE — 82803 BLOOD GASES ANY COMBINATION: CPT

## 2021-03-18 PROCEDURE — 93005 ELECTROCARDIOGRAM TRACING: CPT

## 2021-03-18 PROCEDURE — 73560 X-RAY EXAM OF KNEE 1 OR 2: CPT

## 2021-03-18 PROCEDURE — 85027 COMPLETE CBC AUTOMATED: CPT

## 2021-03-18 PROCEDURE — 85014 HEMATOCRIT: CPT

## 2021-03-18 PROCEDURE — 71045 X-RAY EXAM CHEST 1 VIEW: CPT

## 2021-03-18 PROCEDURE — 85610 PROTHROMBIN TIME: CPT

## 2021-03-18 PROCEDURE — 84484 ASSAY OF TROPONIN QUANT: CPT

## 2021-03-18 PROCEDURE — 93923 UPR/LXTR ART STDY 3+ LVLS: CPT

## 2021-03-18 PROCEDURE — 94640 AIRWAY INHALATION TREATMENT: CPT

## 2021-03-18 PROCEDURE — 83880 ASSAY OF NATRIURETIC PEPTIDE: CPT

## 2021-03-18 PROCEDURE — 80053 COMPREHEN METABOLIC PANEL: CPT

## 2021-03-18 PROCEDURE — U0003: CPT

## 2021-03-18 PROCEDURE — 80048 BASIC METABOLIC PNL TOTAL CA: CPT

## 2021-03-18 PROCEDURE — 81001 URINALYSIS AUTO W/SCOPE: CPT

## 2021-03-18 RX ORDER — SPIRONOLACTONE 25 MG/1
1 TABLET, FILM COATED ORAL
Qty: 0 | Refills: 0 | DISCHARGE
Start: 2021-03-18

## 2021-03-18 RX ORDER — INSULIN GLARGINE 100 [IU]/ML
7 INJECTION, SOLUTION SUBCUTANEOUS AT BEDTIME
Refills: 0 | Status: DISCONTINUED | OUTPATIENT
Start: 2021-03-18 | End: 2021-03-18

## 2021-03-18 RX ORDER — BUMETANIDE 0.25 MG/ML
1 INJECTION INTRAMUSCULAR; INTRAVENOUS
Qty: 0 | Refills: 0 | DISCHARGE
Start: 2021-03-18

## 2021-03-18 RX ORDER — INSULIN LISPRO 100/ML
4 VIAL (ML) SUBCUTANEOUS
Refills: 0 | Status: DISCONTINUED | OUTPATIENT
Start: 2021-03-18 | End: 2021-03-18

## 2021-03-18 RX ORDER — ATORVASTATIN CALCIUM 80 MG/1
1 TABLET, FILM COATED ORAL
Qty: 30 | Refills: 0
Start: 2021-03-18 | End: 2021-04-16

## 2021-03-18 RX ORDER — ASPIRIN/CALCIUM CARB/MAGNESIUM 324 MG
1 TABLET ORAL
Qty: 0 | Refills: 0 | DISCHARGE
Start: 2021-03-18

## 2021-03-18 RX ORDER — APIXABAN 2.5 MG/1
1 TABLET, FILM COATED ORAL
Qty: 0 | Refills: 0 | DISCHARGE
Start: 2021-03-18

## 2021-03-18 RX ORDER — FAMOTIDINE 10 MG/ML
1 INJECTION INTRAVENOUS
Qty: 30 | Refills: 0
Start: 2021-03-18 | End: 2021-04-16

## 2021-03-18 RX ORDER — SPIRONOLACTONE 25 MG/1
1 TABLET, FILM COATED ORAL
Qty: 30 | Refills: 0
Start: 2021-03-18 | End: 2021-04-16

## 2021-03-18 RX ORDER — FAMOTIDINE 10 MG/ML
1 INJECTION INTRAVENOUS
Qty: 0 | Refills: 0 | DISCHARGE
Start: 2021-03-18

## 2021-03-18 RX ORDER — BUMETANIDE 0.25 MG/ML
1 INJECTION INTRAMUSCULAR; INTRAVENOUS
Qty: 0 | Refills: 0 | DISCHARGE

## 2021-03-18 RX ORDER — GABAPENTIN 400 MG/1
1 CAPSULE ORAL
Qty: 0 | Refills: 0 | DISCHARGE
Start: 2021-03-18

## 2021-03-18 RX ORDER — APIXABAN 2.5 MG/1
1 TABLET, FILM COATED ORAL
Qty: 0 | Refills: 0 | DISCHARGE

## 2021-03-18 RX ORDER — ATORVASTATIN CALCIUM 80 MG/1
1 TABLET, FILM COATED ORAL
Qty: 0 | Refills: 0 | DISCHARGE
Start: 2021-03-18

## 2021-03-18 RX ORDER — GABAPENTIN 400 MG/1
300 CAPSULE ORAL
Refills: 0 | Status: DISCONTINUED | OUTPATIENT
Start: 2021-03-18 | End: 2021-03-18

## 2021-03-18 RX ORDER — GABAPENTIN 400 MG/1
3 CAPSULE ORAL
Qty: 0 | Refills: 0 | DISCHARGE

## 2021-03-18 RX ORDER — SIMVASTATIN 20 MG/1
1 TABLET, FILM COATED ORAL
Qty: 0 | Refills: 0 | DISCHARGE

## 2021-03-18 RX ORDER — GABAPENTIN 400 MG/1
1 CAPSULE ORAL
Qty: 60 | Refills: 0
Start: 2021-03-18 | End: 2021-04-16

## 2021-03-18 RX ADMIN — GABAPENTIN 300 MILLIGRAM(S): 400 CAPSULE ORAL at 05:19

## 2021-03-18 RX ADMIN — Medication 4: at 12:11

## 2021-03-18 RX ADMIN — Medication 150 MILLIGRAM(S): at 05:19

## 2021-03-18 RX ADMIN — APIXABAN 2.5 MILLIGRAM(S): 2.5 TABLET, FILM COATED ORAL at 05:19

## 2021-03-18 RX ADMIN — POLYETHYLENE GLYCOL 3350 17 GRAM(S): 17 POWDER, FOR SOLUTION ORAL at 12:12

## 2021-03-18 RX ADMIN — SPIRONOLACTONE 25 MILLIGRAM(S): 25 TABLET, FILM COATED ORAL at 05:19

## 2021-03-18 RX ADMIN — BUMETANIDE 1 MILLIGRAM(S): 0.25 INJECTION INTRAMUSCULAR; INTRAVENOUS at 05:19

## 2021-03-18 RX ADMIN — Medication 3 UNIT(S): at 08:19

## 2021-03-18 RX ADMIN — Medication 2: at 08:20

## 2021-03-18 RX ADMIN — SENNA PLUS 1 TABLET(S): 8.6 TABLET ORAL at 12:12

## 2021-03-18 RX ADMIN — OXYCODONE AND ACETAMINOPHEN 1 TABLET(S): 5; 325 TABLET ORAL at 05:19

## 2021-03-18 RX ADMIN — Medication 81 MILLIGRAM(S): at 12:12

## 2021-03-18 RX ADMIN — Medication 4 UNIT(S): at 12:11

## 2021-03-18 RX ADMIN — OXYCODONE AND ACETAMINOPHEN 1 TABLET(S): 5; 325 TABLET ORAL at 07:09

## 2021-03-18 NOTE — PROGRESS NOTE ADULT - ATTENDING COMMENTS
80 year old woman with two vessel coronary bypass in December 2020, ongoing heart failure with preserved systolic function. Admitted again with volume overload, lower extremity edema, chronic stasis, TAO on CKD. Echo continues to demonstrate preserved systolic function, signs of diastolic dysfunction and now more elevated pulmonary pressure with preserved RV function. 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 She is responding to current regimen and renal function is stable.
Hospitalist- Kaz Metzger MD  Pager: [phone]  If no response or off-hours, page [phone]  -------------------------------------  I personally performed the E/M service provided and supervised key portions of the service furnished by the resident/fellow. I agree with the history, physical and assessment/plan as stated above, with the following additional remarks:  #ADHF- TTE showing grade III diastolic dsfxn and severe pHTN- continue diuresis with lasix 40mg iv daily for now, monitor i/o. pHTN likely heart failure mediated WHO type II. She is now off NC and saturating well.   #LLE pain- swelling and erythema have improved likely 2/2 stasis and HF management, however pt still has severe pain, which she states is better with dangling the leg, suggesting PAD which would not be surprising given her overall CV morbidity burden. Would check CATHLEEN/PVR and consult chronic pain.  dispo: pending improvement in volume status, CATHLEEN/PVR, pain control and PT eval.
#ADHF- TTE showing grade III diastolic dsfxn and severe pHTN- continue diuresis with lasix 40mg iv bid for now, monitor i/o. pHTN likely heart failure mediated WHO type II. Cardiology recs appreciated. Will need repeat TTE when euvolemic.  #LLE pain- swelling and erythema have improved likely 2/2 stasis and HF management, however pt still has severe pain, which she states is better with dangling the leg, suggesting PAD which would not be surprising given her overall CV morbidity burden. Check CATHLEEN/PVR. On gabapentin, would not increase now due to CKD.
Hospitalist- Kaz Metzger MD  Pager: [phone]  If no response or off-hours, page [phone]  -------------------------------------  I personally performed the E/M service provided and supervised key portions of the service furnished by the resident/fellow. I agree with the history, physical and assessment/plan as stated above, with the following additional remarks:  #hypoxic respiratory failure- 2/2 acute decompensated hfpef with pHTN- improved, pt now on RA saturating well, will increase bumex to 1mg po bid. Cont monitor i/o, daily weights.  #B/L swelling and pain-likely 2/2 ADHF and venous stasis, with component of likely PAD- improved with diuresis.   #PAD- moderate per LLE carlos/pvr, will start antithrombotic therapy for risk reduction, aspirin 81mg po daily.  #Severe hip OA- pt was slated for hip replacement with Dr. Goldberg however is now a rather high risk surgical candidate due to recent CABG and now ADHF. Will notify Dr. Goldberg of status for future surgery planning.   dispo; Pt refusing RAMIRO, opting to go to Son Jacky's home. Will work with CM to determine DME and home services needs. Will complete STAR checklist for HF. Discussed plan of care with pt's Shauna mukherjee. Emailed Dr. Alvarez (cards), Dr. Sahni (pcp), Dr. Del Toro (ortho).
Hospitalist- Kaz Metzger MD  Pager: [phone]  If no response or off-hours, page [phone]  -------------------------------------  I personally performed the E/M service provided and supervised key portions of the service furnished by the resident/fellow. I agree with the history, physical and assessment/plan as stated above, with the following additional remarks:  #hypoxic respiratory failure 2/2 ADHF with hfpef and pHTN- improved with aggressive diuresis, now pulling back due to TAO and concerns for hypovolemia. Will repeat TTE to re-eval pHTN. Cardiology recs appreciated.  #LLE pain and swelling- improved, likely 2/2 venous stasis and ADHF, doubt cellulitis. CATHLEEN/PVR with moderate disease, symptoms improved- may f/u as OP for further management.  dispo: PT recommending RAMIRO- will discuss with family
Hospitalist- Kaz Metzger MD  Pager: [phone]  If no response or off-hours, page [phone]  -------------------------------------  I personally performed the E/M service provided and supervised key portions of the service furnished by the resident/fellow. I agree with the history, physical and assessment/plan as stated above, with the following additional remarks:  #hypoxic respiratory failure- 2/2 aspiration PNA and acute decompensated hfpef, superimposed on moderate AS- improving, patient now off NC and saturating well on RA. Encourage OOB, ambulate as tolerated.   #AS- moderate on repeat TTE. Has some evolving crackles which are likely developing pulm edema, so reinstated diuretic.  #acute renal failure- initially likely prerenal from under-resuscitation in the setting of sepsis, now with component of ATN. Expect this will improve, will continue monitoring for now  #supratherapeutic INR- hold coumadin, reinstate at home dose 2.5mg po daily, however will also discuss with OP cards whether patient is best served off AC given his frequent falls.  #dispo: [facility]IRO pending clinical progress, hopefully in the next 2-3 days.
Hospitalist- Kaz Metzger MD  Pager: [phone]  If no response or off-hours, page [phone]  -------------------------------------  I personally performed the E/M service provided and supervised key portions of the service furnished by the resident/fellow. I agree with the history, physical and assessment/plan as stated above, with the following additional remarks:  #hypoxic respiratory failure- improved, patient saturating well on RA, continue bumex 1mg po bid. Dr. Hagan will contact pt for f/u appt in 1 week post discharge.  #LLE swelling and pain- likely venous stasis/peripheral edema, improved. Noted to have moderate PAD- started antithrombotic therapy with aspirin 81mg po daily.  dispo: home to son's house likely today. Discussed with daughter Shauna. Emailed PCP/cardiologist/ortho to update.     total discharge time: 45 minutes.
#ADHF- TTE showing grade III diastolic dsfxn and severe pHTN- continue diuresis with lasix 40mg iv bid for now, monitor i/o. pHTN likely heart failure mediated WHO type II. Cardiology recs appreciated. Will need repeat TTE when euvolemic.  #LLE pain- swelling and erythema have improved likely 2/2 stasis and HF management, however pt still has severe pain, which she states is better with dangling the leg, suggesting PAD which would not be surprising given her overall CV morbidity burden. Would check CATHLEEN/PVR.
Hospitalist- Kaz Metzger MD  Pager: [phone]  If no response or off-hours, page [phone]  -------------------------------------  I personally performed the E/M service provided and supervised key portions of the service furnished by the resident/fellow. I agree with the history, physical and assessment/plan as stated above, with the following additional remarks:  #hypoxic respiratory failure- likely acute decompensated diastolic heart failure- cont diuresis, wean o2 as tolerated. TTE shows grade II diastolic dysfunction, severe pHTN. May need RHC and pulm involvement for further management.  #LLE swelling- likely HF mediated venous stasis vs. DVT. Lower likelihood cellulitis. f/u doppler. observe off abx.

## 2021-03-18 NOTE — PROGRESS NOTE ADULT - PROBLEM SELECTOR PROBLEM 2
Swelling of both lower extremities

## 2021-03-18 NOTE — PROGRESS NOTE ADULT - PROVIDER SPECIALTY LIST ADULT
Cardiology
Nephrology
Nephrology
Cardiology
Nephrology
Internal Medicine

## 2021-03-18 NOTE — PROGRESS NOTE ADULT - SUBJECTIVE AND OBJECTIVE BOX
PROGRESS NOTE:     CONTACT INFO:  Darien Modi MD (Resident Physician - PGY-1 - Internal Medicine)  [email or web address]  Pager: [phone] (any site) or [phone] ([facility] only)    Patient is a 81y old  Female who presents with a chief complaint of CHF Exacerbation (18 Mar 2021 07:18)    [facility] #[id number]    SUBJECTIVE / OVERNIGHT EVENTS:  No acute events overnight. Patient seen and evaluated at bedside. Doing well. Pt would like to go home and notes total resolution of her breathing issues and improvement of her BL LE pain. No fever/chills.  Denies SOB at rest, chest pain, palpitations, abdominal pain, nausea/vomiting    ADDITIONAL REVIEW OF SYSTEMS:    MEDICATIONS  (STANDING):  apixaban 2.5 milliGRAM(s) Oral two times a day  aspirin  chewable 81 milliGRAM(s) Oral daily  atorvastatin 40 milliGRAM(s) Oral at bedtime  buMETAnide 1 milliGRAM(s) Oral two times a day  dextrose 40% Gel 15 Gram(s) Oral once  dextrose 5%. 1000 milliLiter(s) (50 mL/Hr) IV Continuous <Continuous>  dextrose 5%. 1000 milliLiter(s) (100 mL/Hr) IV Continuous <Continuous>  dextrose 50% Injectable 25 Gram(s) IV Push once  dextrose 50% Injectable 25 Gram(s) IV Push once  dextrose 50% Injectable 12.5 Gram(s) IV Push once  famotidine    Tablet 20 milliGRAM(s) Oral at bedtime  gabapentin 300 milliGRAM(s) Oral two times a day  glucagon  Injectable 1 milliGRAM(s) IntraMuscular once  insulin glargine Injectable (LANTUS) 7 Unit(s) SubCutaneous at bedtime  insulin lispro (ADMELOG) corrective regimen sliding scale   SubCutaneous three times a day before meals  insulin lispro (ADMELOG) corrective regimen sliding scale   SubCutaneous at bedtime  insulin lispro Injectable (ADMELOG) 4 Unit(s) SubCutaneous three times a day before meals  metoprolol succinate  milliGRAM(s) Oral daily  polyethylene glycol 3350 17 Gram(s) Oral daily  senna 1 Tablet(s) Oral daily  spironolactone 25 milliGRAM(s) Oral daily    MEDICATIONS  (PRN):  acetaminophen   Tablet .. 650 milliGRAM(s) Oral every 6 hours PRN Mild Pain (1 - 3)  albuterol/ipratropium for Nebulization 3 milliLiter(s) Nebulizer every 6 hours PRN Shortness of Breath and/or Wheezing  oxycodone    5 mG/acetaminophen 325 mG 1 Tablet(s) Oral every 6 hours PRN Moderate Pain (4 - 6)      CAPILLARY BLOOD GLUCOSE      POCT Blood Glucose.: 233 mg/dL (18 Mar 2021 11:51)  POCT Blood Glucose.: 189 mg/dL (18 Mar 2021 08:07)  POCT Blood Glucose.: 269 mg/dL (17 Mar 2021 21:14)  POCT Blood Glucose.: 212 mg/dL (17 Mar 2021 17:04)    I&O's Summary    17 Mar 2021 07:01  -  18 Mar 2021 07:00  --------------------------------------------------------  IN: 800 mL / OUT: 2050 mL / NET: -1250 mL    18 Mar 2021 07:01  -  18 Mar 2021 13:34  --------------------------------------------------------  IN: 720 mL / OUT: 0 mL / NET: 720 mL        PHYSICAL EXAM:  Vital Signs Last 24 Hrs  T(C): 36.6 (18 Mar 2021 13:18), Max: 36.9 (17 Mar 2021 21:05)  T(F): 97.9 (18 Mar 2021 13:18), Max: 98.4 (17 Mar 2021 21:05)  HR: 73 (18 Mar 2021 13:18) (73 - 98)  BP: 143/76 (18 Mar 2021 13:18) (125/69 - 149/81)  BP(mean): --  RR: 17 (18 Mar 2021 13:18) (17 - 18)  SpO2: 96% (18 Mar 2021 13:18) (95% - 97%)    GENERAL: NAD; lying in bed w/head of bed slightly elevated  HEAD: Atraumatic, Normocephalic  EYES: EOMI, PERRLA, conjunctiva and sclera clear  NECK: No JVD. FROM of neck   CHEST/LUNG: No tachypnea. Lungs CTAB. Multiple scars on chest 2/2 mastectomy   HEART: RRR; No murmurs, rubs, or gallops  ABDOMEN: Soft, Nontender, Nondistended; Bowel sounds present. Multiple operative scars on abdomen  EXTREMITIES:  2+ Peripheral Pulses x4. No LE edema. Mildy TTP of BL legs that is improving. LLE erythema is resolved. +R knee medial swelling that is boggy and ttp, but w/continued interval improvement. R knee is free or erythema and warmth  NERVOUS SYSTEM:  Alert & Oriented X4, Good concentration  PSYCH: Normal Affect. Speaking in Full Sentences; not agitated    LABS:                        10.9   9.59  )-----------( 315      ( 18 Mar 2021 06:13 )             36.8     03-18    133<L>  |  92<L>  |  39<H>  ----------------------------<  200<H>  4.4   |  29  |  1.69<H>    Ca    9.9      18 Mar 2021 06:13  Phos  3.1     03-18  Mg     2.2     03-18                  RADIOLOGY & ADDITIONAL TESTS:  Results Reviewed:   Imaging Personally Reviewed:  Electrocardiogram Personally Reviewed:    COORDINATION OF CARE:  Care Discussed with Consultants/Other Providers [Y/N]:  Prior or Outpatient Records Reviewed [Y/N]:

## 2021-03-18 NOTE — PROGRESS NOTE ADULT - PROBLEM SELECTOR PLAN 2
At this time LLE>R in diameter, more tender, and erythematous, shiny with good pulses   - LE doppler neg for DVT   - monitor for cellulitis; no current clinical concerns  - CHF exacerbation management as above    #Right Knee Swelling  -New/increasing R knee swelling on exam w/pain  -TTP and boggy, but free of erythema  -May be confounded by underlying, known, chronic DVT  -XR grossly negative, but limited  -Improving

## 2021-03-18 NOTE — PROGRESS NOTE ADULT - PROBLEM SELECTOR PLAN 10
Ppx: Eliquis 5mg BID    Diet: CC   Code: Full   Dispo: Pending hospital course

## 2021-03-18 NOTE — PROGRESS NOTE ADULT - PROBLEM SELECTOR PROBLEM 4
CAD, multiple vessel

## 2021-03-18 NOTE — PROGRESS NOTE ADULT - PROBLEM SELECTOR PROBLEM 6
Type II diabetes mellitus

## 2021-03-18 NOTE — PROGRESS NOTE ADULT - PROBLEM SELECTOR PROBLEM 5
Kidney disease, chronic, stage III (GFR 30-59 ml/min)

## 2021-03-18 NOTE — PROGRESS NOTE ADULT - REASON FOR ADMISSION
CHF Exacerbation

## 2021-03-18 NOTE — PROGRESS NOTE ADULT - ASSESSMENT
79 y/o F hx/ HTN, HLD, DM2 not on insulin, CAD s/p 2-vessel CABG 11/9/2020, Stage II Diastolic CHF (EF 60% 11/20/20), Breast cancer s/p right partial mastectomy and chemo (1989), Rectal Ca s/p resection and radiation 2015, R calf DVT on eliquis, anemia, CKDIII (Cr: 1.59 baseline) recent [facility] admission 2/4-2/8 for CHF exacerbation p/w increasing weight/swelling, sob, and difficulty ambulating x2 weeks likely 2/2 CHF exacerbation with no known inciting event    -Initial echo w/severe pulmHTN likely ISO acute and severe volume overload; repeat w/moderate pulmHTN  -Mild signs of volume overload on exam; small increase in daily weight. Will increase diuretic frequency to BID  -Neuropathic LE pain continues; will speak w/pharmacy regarding renal dosing and possibility to increase neuropathy meds  -R knee swelling w/o erythema; xray grossly negative, but limited study due to oblique view. Known chronic R DVT may be confounding factor if no effusion noted  -SCr improving w/reduction of diuresis on 3/16, 3/17   -Transitioning to PO Bumex 1mg BID 79 y/o F hx/ HTN, HLD, DM2 not on insulin, CAD s/p 2-vessel CABG 11/9/2020, Stage II Diastolic CHF (EF 60% 11/20/20), Breast cancer s/p right partial mastectomy and chemo (1989), Rectal Ca s/p resection and radiation 2015, R calf DVT on eliquis, anemia, CKDIII (Cr: 1.59 baseline) recent [facility] admission 2/4-2/8 for CHF exacerbation p/w increasing weight/swelling, sob, and difficulty ambulating x2 weeks likely 2/2 CHF exacerbation with no known inciting event    -Initial echo w/severe pulmHTN likely ISO acute and severe volume overload; repeat w/moderate pulmHTN  -Euvolemic and tolerating bumex 1mg BID  -Neuropathic LE pain continues, but improving  -R knee swelling w/o erythema is resolving; xray grossly negative, but limited study due to oblique view. Known chronic R DVT may be confounding factor if no effusion noted  -SCr continues to improve  -c/w PO Bumex 1mg BID  -Stable for D/C

## 2021-03-18 NOTE — PROGRESS NOTE ADULT - PROBLEM SELECTOR PROBLEM 1
Acute on chronic heart failure with preserved ejection fraction

## 2021-03-18 NOTE — PROGRESS NOTE ADULT - PROBLEM SELECTOR PLAN 6
2/16/21: A1c 7.5 Januvia and Glimepiride at home   - ISS, FS qACHS, CC Diet

## 2021-03-18 NOTE — PROGRESS NOTE ADULT - PROBLEM SELECTOR PROBLEM 3
DVT of leg (deep venous thrombosis)

## 2021-03-22 ENCOUNTER — APPOINTMENT (OUTPATIENT)
Dept: CARE COORDINATION | Facility: HOME HEALTH | Age: 81
End: 2021-03-22
Payer: MEDICARE

## 2021-03-22 VITALS
BODY MASS INDEX: 34.76 KG/M2 | OXYGEN SATURATION: 94 % | SYSTOLIC BLOOD PRESSURE: 110 MMHG | DIASTOLIC BLOOD PRESSURE: 62 MMHG | RESPIRATION RATE: 16 BRPM | WEIGHT: 178 LBS | HEART RATE: 86 BPM | TEMPERATURE: 97.7 F

## 2021-03-22 VITALS — BODY MASS INDEX: 34.76 KG/M2 | WEIGHT: 178 LBS

## 2021-03-22 PROCEDURE — 99348 HOME/RES VST EST LOW MDM 30: CPT

## 2021-03-22 RX ORDER — SIMVASTATIN 20 MG/1
20 TABLET, FILM COATED ORAL
Qty: 90 | Refills: 0 | Status: DISCONTINUED | COMMUNITY
Start: 2021-01-12 | End: 2021-03-22

## 2021-03-22 RX ORDER — OXYCODONE AND ACETAMINOPHEN 10; 325 MG/1; MG/1
10-325 TABLET ORAL EVERY 8 HOURS
Qty: 90 | Refills: 0 | Status: DISCONTINUED | COMMUNITY
Start: 2021-01-12 | End: 2021-03-22

## 2021-03-22 RX ORDER — ACETAMINOPHEN 500 MG/1
500 TABLET, COATED ORAL
Qty: 60 | Refills: 0 | Status: DISCONTINUED | COMMUNITY
Start: 2018-05-24 | End: 2021-03-22

## 2021-03-22 RX ORDER — BUMETANIDE 0.5 MG/1
0.5 TABLET ORAL DAILY
Qty: 90 | Refills: 0 | Status: DISCONTINUED | COMMUNITY
Start: 2021-02-23 | End: 2021-03-22

## 2021-03-22 RX ORDER — LIDOCAINE 5 G/100G
5 OINTMENT TOPICAL
Qty: 1 | Refills: 5 | Status: DISCONTINUED | COMMUNITY
Start: 2020-10-20 | End: 2021-03-22

## 2021-03-22 RX ORDER — GABAPENTIN 600 MG/1
600 TABLET, FILM COATED ORAL
Refills: 0 | Status: DISCONTINUED | COMMUNITY
Start: 2021-02-23 | End: 2021-03-22

## 2021-03-22 RX ORDER — LEVALBUTEROL TARTRATE 45 UG/1
45 AEROSOL, METERED ORAL
Qty: 1 | Refills: 3 | Status: DISCONTINUED | COMMUNITY
Start: 2018-05-11 | End: 2021-03-22

## 2021-03-22 NOTE — ASSESSMENT
[FreeTextEntry1] : 82 y/o Turkmen speaking woman with PMHx of HTN, HLD, T2DM, CAD S/p CABG x2, HF, Breast CA s/p Right partial mastectomy/chemo, Rectal CA s/p resection/radiation, Right LE DVT and CKD3 admitted for Heart failure exacerbation. \par \par Vitals at 1230 on 3/22/21:\par BP: 110/62\par HR: 86\par RR: 16\par Sp02: 94% on RA\par Temp: 97.7 F temporal\par \par Patient and son will need constant reinforcement of diet, medications and disease managament.\par Patient is overall clinically stable and to f/u with Dr. Hagan on 3/25/2021.

## 2021-03-22 NOTE — PHYSICAL EXAM
[No Acute Distress] : no acute distress [Well Developed] : well developed [No JVD] : no jugular venous distention [No Respiratory Distress] : no respiratory distress  [No Accessory Muscle Use] : no accessory muscle use [Normal Rhythm/Effort] : normal respiratory rhythm and effort [Acc Muscles Use] : no accessory muscle use [Clear Bilaterally] : the lungs were clear to auscultation bilaterally [Rales / Crackles Bilateral] : no rales or crackles were heard [Pedal Pulses Present] : the pedal pulses are present [Soft] : abdomen soft [Non Tender] : non-tender [Non-distended] : non-distended [Normal Bowel Sounds] : normal bowel sounds [de-identified] : b/l trace edema (non pitting)

## 2021-03-22 NOTE — HISTORY OF PRESENT ILLNESS
[Family Member] : family member [FreeTextEntry1] : TCM Follow up for Hospitalization: HF [de-identified] : 82 y/o Ugandan speaking woman with PMHx of HTN, HLD, T2DM, CAD S/p CABG x2, HF, Breast CA s/p Right partial mastectomy/chemo, Rectal CA s/p resection/radiation, Right LE DVT and CKD3 who presented to [facility] with increased edema, weight and SOB x 2 weeks. Patient was recently hospitalized at [facility] for HF exacerbation in 2/2021. Treated with IV diuretics and transitioned to PO diuretics. Discharged home with [facility] services. \par \par \par Patient seen in the home. A&Ox3. Patient’s son present during home visit. Ambulates with rollator. Denies SOB, chest pain, fever/chills, orthopnea/PND, increased edema, increased fatigue and/or poor appetite. Weight today during visit was 178 lbs. Discharge wt.: 175.5 lbs. Patient and son forgot to perform daily weights today. DME in the home: hospital bed, RW, Rollator and glucometer. Patient is not checking blood glucose daily, has working glucometer. Adhering to prescribed medications. \par \par

## 2021-03-22 NOTE — CURRENT MEDS
[Yes] : Reviewed medication list for presence of high-risk medications. [Opioids] : opioids [Blood Thinners] : blood thinners

## 2021-03-24 NOTE — PHYSICAL THERAPY INITIAL EVALUATION ADULT - IMPAIRMENTS CONTRIBUTING TO GAIT DEVIATIONS, PT EVAL
Alert-The patient is alert, awake and responds to voice. The patient is oriented to time, place, and person. The triage nurse is able to obtain subjective information. impaired balance/impaired postural control/decreased strength

## 2021-03-25 ENCOUNTER — APPOINTMENT (OUTPATIENT)
Dept: INTERNAL MEDICINE | Facility: CLINIC | Age: 81
End: 2021-03-25
Payer: MEDICARE

## 2021-03-25 ENCOUNTER — APPOINTMENT (OUTPATIENT)
Dept: CARDIOLOGY | Facility: CLINIC | Age: 81
End: 2021-03-25
Payer: MEDICARE

## 2021-03-25 ENCOUNTER — NON-APPOINTMENT (OUTPATIENT)
Age: 81
End: 2021-03-25

## 2021-03-25 VITALS
HEART RATE: 68 BPM | TEMPERATURE: 97.7 F | BODY MASS INDEX: 35.15 KG/M2 | WEIGHT: 180 LBS | RESPIRATION RATE: 12 BRPM | SYSTOLIC BLOOD PRESSURE: 121 MMHG | OXYGEN SATURATION: 96 % | DIASTOLIC BLOOD PRESSURE: 76 MMHG | HEIGHT: 60 IN

## 2021-03-25 DIAGNOSIS — I25.9 ACUTE CORONARY THROMBOSIS NOT RESULTING IN MYOCARDIAL INFARCTION: ICD-10-CM

## 2021-03-25 DIAGNOSIS — I24.0 ACUTE CORONARY THROMBOSIS NOT RESULTING IN MYOCARDIAL INFARCTION: ICD-10-CM

## 2021-03-25 DIAGNOSIS — I50.21 ACUTE SYSTOLIC (CONGESTIVE) HEART FAILURE: ICD-10-CM

## 2021-03-25 DIAGNOSIS — Z71.89 OTHER SPECIFIED COUNSELING: ICD-10-CM

## 2021-03-25 PROCEDURE — 99214 OFFICE O/P EST MOD 30 MIN: CPT

## 2021-03-25 PROCEDURE — 99214 OFFICE O/P EST MOD 30 MIN: CPT | Mod: 25

## 2021-03-25 PROCEDURE — 93000 ELECTROCARDIOGRAM COMPLETE: CPT

## 2021-03-25 NOTE — PHYSICAL EXAM
[No Acute Distress] : no acute distress [Well Nourished] : well nourished [Normal Sclera/Conjunctiva] : normal sclera/conjunctiva [No JVD] : no jugular venous distention [No Lymphadenopathy] : no lymphadenopathy [Supple] : supple [No Respiratory Distress] : no respiratory distress  [No Accessory Muscle Use] : no accessory muscle use [Clear to Auscultation] : lungs were clear to auscultation bilaterally [Normal Rate] : normal rate  [Regular Rhythm] : with a regular rhythm [Normal S1, S2] : normal S1 and S2 [No Murmur] : no murmur heard [Soft] : abdomen soft [Non Tender] : non-tender [Non-distended] : non-distended [Normal Bowel Sounds] : normal bowel sounds [No CVA Tenderness] : no CVA  tenderness [No Spinal Tenderness] : no spinal tenderness [No Focal Deficits] : no focal deficits [Normal Affect] : the affect was normal [Alert and Oriented x3] : oriented to person, place, and time [Normal Insight/Judgement] : insight and judgment were intact [de-identified] : Trace non pitting edema of the right lower extremity above the foot, chronic stasis changes [de-identified] : no range of motion of the right hip, with extreme tenderness [de-identified] : Wheelchair-bound.  Needs assist device.

## 2021-03-29 LAB
ALBUMIN SERPL ELPH-MCNC: 4.2 G/DL
ALP BLD-CCNC: 101 U/L
ALT SERPL-CCNC: 14 U/L
ANION GAP SERPL CALC-SCNC: 18 MMOL/L
AST SERPL-CCNC: 23 U/L
BASOPHILS # BLD AUTO: 0.03 K/UL
BASOPHILS NFR BLD AUTO: 0.4 %
BILIRUB SERPL-MCNC: 0.2 MG/DL
BUN SERPL-MCNC: 45 MG/DL
CALCIUM SERPL-MCNC: 10.1 MG/DL
CHLORIDE SERPL-SCNC: 101 MMOL/L
CHOLEST SERPL-MCNC: 140 MG/DL
CO2 SERPL-SCNC: 20 MMOL/L
CREAT SERPL-MCNC: 1.63 MG/DL
EOSINOPHIL # BLD AUTO: 0.08 K/UL
EOSINOPHIL NFR BLD AUTO: 1 %
ESTIMATED AVERAGE GLUCOSE: 177 MG/DL
GLUCOSE SERPL-MCNC: 158 MG/DL
GLUCOSE SERPL-MCNC: 180 MG/DL
HBA1C MFR BLD HPLC: 7.8 %
HCT VFR BLD CALC: 41.1 %
HDLC SERPL-MCNC: 49 MG/DL
HGB BLD-MCNC: 11.6 G/DL
IMM GRANULOCYTES NFR BLD AUTO: 0.1 %
LDLC SERPL CALC-MCNC: 56 MG/DL
LYMPHOCYTES # BLD AUTO: 1.39 K/UL
LYMPHOCYTES NFR BLD AUTO: 18.1 %
MAGNESIUM SERPL-MCNC: 2.3 MG/DL
MAN DIFF?: NORMAL
MCHC RBC-ENTMCNC: 24.5 PG
MCHC RBC-ENTMCNC: 28.2 GM/DL
MCV RBC AUTO: 86.7 FL
MONOCYTES # BLD AUTO: 0.62 K/UL
MONOCYTES NFR BLD AUTO: 8.1 %
NEUTROPHILS # BLD AUTO: 5.56 K/UL
NEUTROPHILS NFR BLD AUTO: 72.3 %
NONHDLC SERPL-MCNC: 91 MG/DL
PLATELET # BLD AUTO: 393 K/UL
POTASSIUM SERPL-SCNC: 4.4 MMOL/L
PROT SERPL-MCNC: 7.9 G/DL
RBC # BLD: 4.74 M/UL
RBC # FLD: 17.6 %
SODIUM SERPL-SCNC: 139 MMOL/L
TRIGL SERPL-MCNC: 176 MG/DL
WBC # FLD AUTO: 7.69 K/UL

## 2021-03-29 NOTE — REVIEW OF SYSTEMS
[Joint Pain] : joint pain [Negative] : Heme/Lymph [Dyspnea on exertion] : dyspnea during exertion [Lower Ext Edema] : lower extremity edema

## 2021-03-29 NOTE — PHYSICAL EXAM
[General Appearance - Well Developed] : well developed [Normal Appearance] : normal appearance [General Appearance - Well Nourished] : well nourished [Normal Conjunctiva] : the conjunctiva exhibited no abnormalities [Respiration, Rhythm And Depth] : normal respiratory rhythm and effort [Exaggerated Use Of Accessory Muscles For Inspiration] : no accessory muscle use [Auscultation Breath Sounds / Voice Sounds] : lungs were clear to auscultation bilaterally [Normal Rate] : normal [Rhythm Regular] : regular [Normal S1] : normal S1 [Normal S2] : normal S2 [II] : a grade 2 [Abdomen Soft] : soft [Abdomen Tenderness] : non-tender [Nail Clubbing] : no clubbing of the fingernails [Cyanosis, Localized] : no localized cyanosis [Petechial Hemorrhages (___cm)] : no petechial hemorrhages [Skin Color & Pigmentation] : normal skin color and pigmentation [] : no rash [Oriented To Time, Place, And Person] : oriented to person, place, and time [Affect] : the affect was normal [Mood] : the mood was normal [No Anxiety] : not feeling anxious [Well Groomed] : well groomed [Right Carotid Bruit] : no bruit heard over the right carotid [Left Carotid Bruit] : no bruit heard over the left carotid [Bruit] : no bruit heard [___ +] : bilateral [unfilled]U+ pitting edema to the ankles [Bowel Sounds] : normal bowel sounds [FreeTextEntry1] : 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

## 2021-03-29 NOTE — DISCUSSION/SUMMARY
[FreeTextEntry1] : IMPRESSION: Ms. Batista is an 81 year old woman with a history of HTN, hyperlipidemia, type 2 diabetes mellitus, breast cancer status post right partial mastectomy, rectal carcinoma status post resection, diastolic heart failure, anemia, hypokalemia, CAD s/p 2-vessel CABG 11/9/2020, and renal insufficiency who presents today for follow up of HTN and heart failure.\par \par PLAN:\par 1. She is s/p CABG four months ago. She has not experienced any chest pain since her surgery, however, she has had episodes of decompensated heart failure.  She will continue on Aspirin 81 mg daily. Her ECH today revealed sinus rhythm without any ectopy.\par 2. She will continue on the Eliquis 2.5 mg twice daily for her DVT and she will continue to follow up with Vascular Cardiology. She states that she has had LE dopplers done while she was in the hospital that were negative for DVT. \par 3. For her CHF and lower extremity edema she will continue on Bumex 1 mg twice daily and Spironolactone 25mg daily. She will also continue on a low sodium diet and restrict her fluid intake.  She will also continue to closely monitor her weights at home and let us know if her weight continues to trend upward. We will need to follow her potassium and creatinine closely on the higher dose of Bumex and with the addition of Spironolactone. She is scheduled to see Nephrology tomorrow.\par 4. Her blood pressure is good today, thus she will continue to watch her diet and will also continue on Metoprolol  mg daily, Lasix, Spironolactone, and Amlodipine 5mg daily.\par 5. Her most recent LDL was very good, thus she will continue on Lipitor 40 mg daily along with a low cholesterol diet given her mildly elevated triglycerides. \par 6. She will follow up with me in about 3 weeks or sooner if she is symptomatic. Her daughter will notify me should her weight trend up.

## 2021-04-08 NOTE — PROGRESS NOTE ADULT - PROBLEM/PLAN-9
DISPLAY PLAN FREE TEXT
Tissue Cultured Epidermal Autograft Text: The defect edges were debeveled with a #15 scalpel blade.  Given the location of the defect, shape of the defect and the proximity to free margins a tissue cultured epidermal autograft was deemed most appropriate.  The graft was then trimmed to fit the size of the defect.  The graft was then placed in the primary defect and oriented appropriately.

## 2021-04-15 ENCOUNTER — APPOINTMENT (OUTPATIENT)
Dept: CARDIOLOGY | Facility: CLINIC | Age: 81
End: 2021-04-15
Payer: MEDICARE

## 2021-04-15 ENCOUNTER — APPOINTMENT (OUTPATIENT)
Dept: INTERNAL MEDICINE | Facility: CLINIC | Age: 81
End: 2021-04-15
Payer: MEDICARE

## 2021-04-15 ENCOUNTER — NON-APPOINTMENT (OUTPATIENT)
Age: 81
End: 2021-04-15

## 2021-04-15 VITALS
SYSTOLIC BLOOD PRESSURE: 119 MMHG | HEART RATE: 68 BPM | TEMPERATURE: 97.3 F | BODY MASS INDEX: 34.55 KG/M2 | DIASTOLIC BLOOD PRESSURE: 68 MMHG | WEIGHT: 176 LBS | RESPIRATION RATE: 12 BRPM | OXYGEN SATURATION: 98 % | HEIGHT: 60 IN

## 2021-04-15 DIAGNOSIS — I87.2 VENOUS INSUFFICIENCY (CHRONIC) (PERIPHERAL): ICD-10-CM

## 2021-04-15 PROCEDURE — 93000 ELECTROCARDIOGRAM COMPLETE: CPT

## 2021-04-15 PROCEDURE — 99214 OFFICE O/P EST MOD 30 MIN: CPT | Mod: 25

## 2021-04-15 PROCEDURE — 99214 OFFICE O/P EST MOD 30 MIN: CPT

## 2021-04-15 NOTE — PHYSICAL EXAM
[No Acute Distress] : no acute distress [Well Nourished] : well nourished [Normal Sclera/Conjunctiva] : normal sclera/conjunctiva [No JVD] : no jugular venous distention [No Lymphadenopathy] : no lymphadenopathy [Supple] : supple [No Respiratory Distress] : no respiratory distress  [No Accessory Muscle Use] : no accessory muscle use [Clear to Auscultation] : lungs were clear to auscultation bilaterally [Normal Rate] : normal rate  [Regular Rhythm] : with a regular rhythm [Normal S1, S2] : normal S1 and S2 [No Murmur] : no murmur heard [Soft] : abdomen soft [Non Tender] : non-tender [Non-distended] : non-distended [Normal Bowel Sounds] : normal bowel sounds [No CVA Tenderness] : no CVA  tenderness [No Spinal Tenderness] : no spinal tenderness [No Focal Deficits] : no focal deficits [Normal Affect] : the affect was normal [Alert and Oriented x3] : oriented to person, place, and time [Normal Insight/Judgement] : insight and judgment were intact [de-identified] : Trace non pitting edema of the right lower extremity above the foot, chronic stasis changes [de-identified] : no range of motion of the right hip, with extreme tenderness [de-identified] : Wheelchair-bound.  Needs assist device.

## 2021-04-15 NOTE — HISTORY OF PRESENT ILLNESS
[FreeTextEntry1] : Patient is an 81 year old woman with a history of HTN, hyperlipidemia, type 2 diabetes mellitus, breast cancer status post right partial mastectomy, rectal carcinoma status post resection, anemia, hypokalemia, diastolic heart failure, right lower extremity DVT, CAD s/p 2 vessel CABG 11/9/2020 and renal insufficiency who presents today for follow up of HTN and heart failure. Three days ago she stopped taking the Spironolactone because she was having urinary frequency, dizziness and weakness. She also had abdominal burning pain for three days (Sunday -Tuesday), which started resolving yesterday and is resolved today. She has not taken either of her diuretics today. She was previously taking the Bumex 1 mg BID. Prior to her hospitalization last month she was taking Spironolactone half a tablet daily. As per her daughter she has lost about 10lbs in the past week. Over the past few days her weights have been relatively stable. She has been following with her nephrologist Dr. Keller.  Currently she denies any chest pain, palpitations, dyspnea at rest, headaches, or dizziness. Her right upper extremity swelling due to chronic lymphedema and dyspnea with exertion are unchanged. \par

## 2021-04-15 NOTE — PHYSICAL EXAM
[General Appearance - Well Developed] : well developed [Normal Appearance] : normal appearance [General Appearance - Well Nourished] : well nourished [No Deformities] : no deformities [Normal Conjunctiva] : the conjunctiva exhibited no abnormalities [Normal Jugular Venous A Waves Present] : normal jugular venous A waves present [Normal Jugular Venous V Waves Present] : normal jugular venous V waves present [Respiration, Rhythm And Depth] : normal respiratory rhythm and effort [Exaggerated Use Of Accessory Muscles For Inspiration] : no accessory muscle use [Auscultation Breath Sounds / Voice Sounds] : lungs were clear to auscultation bilaterally [Normal Rate] : normal [Rhythm Regular] : regular [Normal S1] : normal S1 [Normal S2] : normal S2 [II] : a grade 2 [Abdomen Soft] : soft [Abdomen Tenderness] : non-tender [Nail Clubbing] : no clubbing of the fingernails [Cyanosis, Localized] : no localized cyanosis [Petechial Hemorrhages (___cm)] : no petechial hemorrhages [Skin Color & Pigmentation] : normal skin color and pigmentation [] : no rash [Oriented To Time, Place, And Person] : oriented to person, place, and time [Affect] : the affect was normal [Mood] : the mood was normal [No Anxiety] : not feeling anxious [No Pitting Edema] : no pitting edema present [Bowel Sounds] : normal bowel sounds [No Skin Ulcers] : no skin ulcer [Right Carotid Bruit] : no bruit heard over the right carotid [Left Carotid Bruit] : no bruit heard over the left carotid [Bruit] : no bruit heard [FreeTextEntry1] : 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

## 2021-04-15 NOTE — REVIEW OF SYSTEMS
[Recent Change In Weight] : ~T recent weight change [Leg Claudication] : leg claudication [Lower Ext Edema] : lower extremity edema [Incontinence] : incontinence [Nocturia] : nocturia [Joint Pain] : joint pain [Joint Stiffness] : joint stiffness [Skin Rash] : skin rash [Unsteady Walking] : ataxia [Anxiety] : anxiety [Negative] : Heme/Lymph [Fever] : no fever [Chills] : no chills [Fatigue] : no fatigue [Hot Flashes] : no hot flashes [Night Sweats] : no night sweats [Chest Pain] : no chest pain [Palpitations] : no palpitations [Orthopnea] : no orthopnea [Paroxysmal Nocturnal Dyspnea] : no paroxysmal nocturnal dyspnea [Dysuria] : no dysuria [Hematuria] : no hematuria [Frequency] : no frequency [Vaginal Discharge] : no vaginal discharge [Joint Swelling] : no joint swelling [Muscle Weakness] : no muscle weakness [Muscle Pain] : no muscle pain [Back Pain] : no back pain [Itching] : no itching [Headache] : no headache [Dizziness] : no dizziness [Fainting] : no fainting [Confusion] : no confusion [Memory Loss] : no memory loss [Suicidal] : not suicidal [Insomnia] : no insomnia [Depression] : no depression [FreeTextEntry2] : SEE HPI [FreeTextEntry9] : RIGHT HIP [de-identified] : Chronic right lower extremity changes [de-identified] : Wheelchair-bound

## 2021-04-15 NOTE — DISCUSSION/SUMMARY
[FreeTextEntry1] : IMPRESSION: Ms. Batista is an 81 year old woman with a history of HTN, hyperlipidemia, type 2 diabetes mellitus, breast cancer status post right partial mastectomy, rectal carcinoma status post resection, diastolic heart failure, anemia, hypokalemia, CAD s/p 2-vessel CABG 11/9/2020, and renal insufficiency who presents today for follow up of HTN and heart failure.\par \par PLAN:\par 1. She is s/p CABG 11/2020 and has not experienced any chest pain since her surgery, however, she has had episodes of decompensated heart failure.  She will continue on Aspirin 81 mg daily. Her ECG that was performed today revealed sinus rhythm without any ectopy and was relatively unchanged from previous.\par 2. She will continue on the Eliquis 2.5 mg twice daily for her DVT and she will continue to follow up with Vascular Cardiology. She had LE Doppler done while she was in the hospital last month that were negative for DVT. \par 3. For her CHF and lower extremity edema she will continue on Bumex 1 mg twice daily and I have asked her to restart on Spironolactone at 25 mg three times a week as long as her potassium and creatinine are stable. She will also continue on a low sodium diet and restrict her fluid intake.  She will also continue to closely monitor her weights at home and let us know if her weight continues to trend upward. We will need to follow her potassium and creatinine closely on the diuretics. She will continue to follow with nephrology and have blood work done later today.\par 4. Her blood pressure is very good today, thus she will continue to watch her diet and will also continue on Metoprolol  mg daily, Bumex, and Amlodipine 5mg daily.\par 5. Her most recent LDL was very good, thus she will continue on Lipitor 40 mg daily along with a low cholesterol diet given her mildly elevated triglycerides. \par 6. She will follow up with me in about 6 weeks or sooner if she is symptomatic. Her daughter will notify me should her weight trend up.

## 2021-04-16 LAB
ANION GAP SERPL CALC-SCNC: 14 MMOL/L
APPEARANCE: ABNORMAL
BACTERIA: ABNORMAL
BASOPHILS # BLD AUTO: 0.03 K/UL
BASOPHILS NFR BLD AUTO: 0.3 %
BILIRUBIN URINE: NEGATIVE
BLOOD URINE: NORMAL
BUN SERPL-MCNC: 38 MG/DL
CALCIUM SERPL-MCNC: 9.5 MG/DL
CHLORIDE SERPL-SCNC: 101 MMOL/L
CO2 SERPL-SCNC: 23 MMOL/L
COLOR: YELLOW
CREAT SERPL-MCNC: 1.69 MG/DL
CREAT SPEC-SCNC: 123 MG/DL
EOSINOPHIL # BLD AUTO: 0.18 K/UL
EOSINOPHIL NFR BLD AUTO: 1.9 %
GLUCOSE QUALITATIVE U: NEGATIVE
GLUCOSE SERPL-MCNC: 148 MG/DL
HCT VFR BLD CALC: 41.6 %
HGB BLD-MCNC: 12 G/DL
HYALINE CASTS: 2 /LPF
IMM GRANULOCYTES NFR BLD AUTO: 0.4 %
KETONES URINE: NEGATIVE
LEUKOCYTE ESTERASE URINE: ABNORMAL
LYMPHOCYTES # BLD AUTO: 1.23 K/UL
LYMPHOCYTES NFR BLD AUTO: 12.9 %
MAGNESIUM SERPL-MCNC: 2.2 MG/DL
MAN DIFF?: NORMAL
MCHC RBC-ENTMCNC: 25.5 PG
MCHC RBC-ENTMCNC: 28.8 GM/DL
MCV RBC AUTO: 88.3 FL
MICROALBUMIN 24H UR DL<=1MG/L-MCNC: 7.8 MG/DL
MICROALBUMIN/CREAT 24H UR-RTO: 63 MG/G
MICROSCOPIC-UA: NORMAL
MONOCYTES # BLD AUTO: 0.66 K/UL
MONOCYTES NFR BLD AUTO: 6.9 %
NEUTROPHILS # BLD AUTO: 7.36 K/UL
NEUTROPHILS NFR BLD AUTO: 77.6 %
NITRITE URINE: POSITIVE
PH URINE: 5.5
PLATELET # BLD AUTO: 281 K/UL
POTASSIUM SERPL-SCNC: 4.8 MMOL/L
PROTEIN URINE: ABNORMAL
RBC # BLD: 4.71 M/UL
RBC # FLD: 19 %
RED BLOOD CELLS URINE: 2 /HPF
SODIUM SERPL-SCNC: 138 MMOL/L
SPECIFIC GRAVITY URINE: 1.02
SQUAMOUS EPITHELIAL CELLS: 0 /HPF
URATE SERPL-MCNC: 7.7 MG/DL
UROBILINOGEN URINE: NORMAL
WBC # FLD AUTO: 9.5 K/UL
WHITE BLOOD CELLS URINE: >720 /HPF

## 2021-04-19 DIAGNOSIS — N30.00 ACUTE CYSTITIS W/OUT HEMATURIA: ICD-10-CM

## 2021-04-19 LAB — BACTERIA UR CULT: ABNORMAL

## 2021-04-20 ENCOUNTER — APPOINTMENT (OUTPATIENT)
Dept: INTERNAL MEDICINE | Facility: CLINIC | Age: 81
End: 2021-04-20

## 2021-04-23 ENCOUNTER — RX RENEWAL (OUTPATIENT)
Age: 81
End: 2021-04-23

## 2021-05-01 ENCOUNTER — APPOINTMENT (OUTPATIENT)
Dept: CARDIOLOGY | Facility: CLINIC | Age: 81
End: 2021-05-01

## 2021-05-25 NOTE — ED ADULT NURSE NOTE - CAS TRG GENERAL AIRWAY, MLM
Problem: NORMAL   Goal: Experiences normal transition  Description: INTERVENTIONS:  - Assess and monitor vital signs and lab values.   - Encourage skin-to-skin with caregiver for thermoregulation  - Assess signs, symptoms and risk factors for hypog
Patent

## 2021-06-22 ENCOUNTER — APPOINTMENT (OUTPATIENT)
Dept: CARDIOLOGY | Facility: CLINIC | Age: 81
End: 2021-06-22
Payer: MEDICARE

## 2021-06-22 ENCOUNTER — APPOINTMENT (OUTPATIENT)
Dept: INTERNAL MEDICINE | Facility: CLINIC | Age: 81
End: 2021-06-22
Payer: MEDICARE

## 2021-06-22 ENCOUNTER — NON-APPOINTMENT (OUTPATIENT)
Age: 81
End: 2021-06-22

## 2021-06-22 VITALS
OXYGEN SATURATION: 99 % | HEIGHT: 60 IN | RESPIRATION RATE: 12 BRPM | SYSTOLIC BLOOD PRESSURE: 143 MMHG | HEART RATE: 69 BPM | TEMPERATURE: 97.5 F | DIASTOLIC BLOOD PRESSURE: 75 MMHG

## 2021-06-22 VITALS — DIASTOLIC BLOOD PRESSURE: 68 MMHG | SYSTOLIC BLOOD PRESSURE: 114 MMHG
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
## 2021-06-22 DIAGNOSIS — S61.451A OPEN BITE OF RIGHT HAND, INITIAL ENCOUNTER: ICD-10-CM

## 2021-06-22 PROCEDURE — 99214 OFFICE O/P EST MOD 30 MIN: CPT

## 2021-06-22 PROCEDURE — 93000 ELECTROCARDIOGRAM COMPLETE: CPT

## 2021-06-22 PROCEDURE — 99214 OFFICE O/P EST MOD 30 MIN: CPT | Mod: 25

## 2021-06-22 RX ORDER — CIPROFLOXACIN HYDROCHLORIDE 250 MG/1
250 TABLET, FILM COATED ORAL
Qty: 10 | Refills: 0 | Status: DISCONTINUED | COMMUNITY
Start: 2021-04-19 | End: 2021-06-22

## 2021-06-22 RX ORDER — GABAPENTIN 300 MG/1
300 CAPSULE ORAL TWICE DAILY
Qty: 180 | Refills: 0 | Status: DISCONTINUED | COMMUNITY
End: 2021-06-22

## 2021-06-22 NOTE — HISTORY OF PRESENT ILLNESS
[FreeTextEntry1] : Patient is an 81 year old woman with a history of HTN, hyperlipidemia, type 2 diabetes mellitus, breast cancer status post right partial mastectomy, rectal carcinoma status post resection, anemia, hypokalemia, diastolic heart failure, right lower extremity DVT, CAD s/p 2 vessel CABG 11/9/2020 and renal insufficiency who presents today for follow up of HTN and heart failure. She states that she has been feeling well since her last visit in the middle of April. Currently she denies any chest pain, palpitations, dyspnea at rest, headaches, or dizziness. Her right upper extremity swelling due to chronic lymphedema and dyspnea with exertion are unchanged. Her weights have reportedly been stable as she has been watching her diet.  Her major complaint at this time continues to be joint pain.  \par

## 2021-06-22 NOTE — DISCUSSION/SUMMARY
[FreeTextEntry1] : IMPRESSION: Ms. Batista is an 81 year old woman with a history of HTN, hyperlipidemia, type 2 diabetes mellitus, breast cancer status post right partial mastectomy, rectal carcinoma status post resection, diastolic heart failure, anemia, hypokalemia, CAD s/p 2-vessel CABG 11/9/2020, and renal insufficiency who presents today for follow up of HTN and heart failure.\par \par PLAN:\par 1. She is s/p CABG 11/2020 and has not experienced any chest pain since her surgery, however, she has had episodes of decompensated heart failure.  She will continue on Aspirin 81 mg daily. Her ECG that was performed today revealed sinus rhythm without any ectopy and was relatively unchanged from previous. She has not had any significant weight gain or decompensated heart failure over the past 2 months. \par 2. She will continue on Eliquis 2.5 mg twice daily for her DVT and she will continue to follow up with Vascular Cardiology. Her most recent LE Doppler done while she was in the hospital was negative for DVT. \par 3. For her CHF and lower extremity edema she will continue on Bumex 1 mg twice daily and  Spironolactone 12.5 mg daily as long as her potassium and creatinine are stable. She will also continue on a low sodium diet and restrict her fluid intake.  She will also continue to closely monitor her weights at home and let us know if her weight trends up. She will continue to follow with nephrology and have blood work done later today.\par 4. Her blood pressure is good today, thus she will continue to watch her diet and will also continue on Metoprolol  mg daily, Bumex, Spironolactone, and Amlodipine 5 mg daily.\par 5. Her most recent LDL was very good, thus she will continue on Lipitor 40 mg daily along with a low cholesterol diet given her mildly elevated triglycerides. \par 6. She will follow up with me in 3 months or sooner if she is symptomatic. Her daughter will notify me should her weight trend up. \par 7. She is optimized from the cardiac standpoint and may speak with her surgeon about possible hip surgery.

## 2021-06-22 NOTE — PHYSICAL EXAM
[General Appearance - Well Developed] : well developed [Normal Appearance] : normal appearance [General Appearance - Well Nourished] : well nourished [No Deformities] : no deformities [Normal Conjunctiva] : the conjunctiva exhibited no abnormalities [Normal Jugular Venous A Waves Present] : normal jugular venous A waves present [Normal Jugular Venous V Waves Present] : normal jugular venous V waves present [Respiration, Rhythm And Depth] : normal respiratory rhythm and effort [Exaggerated Use Of Accessory Muscles For Inspiration] : no accessory muscle use [Auscultation Breath Sounds / Voice Sounds] : lungs were clear to auscultation bilaterally [Normal Rate] : normal [Rhythm Regular] : regular [Normal S1] : normal S1 [Normal S2] : normal S2 [II] : a grade 2 [No Pitting Edema] : no pitting edema present [Bowel Sounds] : normal bowel sounds [Abdomen Soft] : soft [Abdomen Tenderness] : non-tender [Nail Clubbing] : no clubbing of the fingernails [Cyanosis, Localized] : no localized cyanosis [Petechial Hemorrhages (___cm)] : no petechial hemorrhages [Skin Color & Pigmentation] : normal skin color and pigmentation [] : no rash [No Skin Ulcers] : no skin ulcer [Oriented To Time, Place, And Person] : oriented to person, place, and time [Affect] : the affect was normal [Mood] : the mood was normal [No Anxiety] : not feeling anxious [Well Groomed] : well groomed [General Appearance - In No Acute Distress] : no acute distress [Right Carotid Bruit] : no bruit heard over the right carotid [Left Carotid Bruit] : no bruit heard over the left carotid [Bruit] : no bruit heard [FreeTextEntry1] : 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

## 2021-06-22 NOTE — PHYSICAL EXAM
[No Acute Distress] : no acute distress [Well Nourished] : well nourished [Normal Sclera/Conjunctiva] : normal sclera/conjunctiva [No JVD] : no jugular venous distention [No Lymphadenopathy] : no lymphadenopathy [Supple] : supple [No Respiratory Distress] : no respiratory distress  [No Accessory Muscle Use] : no accessory muscle use [Clear to Auscultation] : lungs were clear to auscultation bilaterally [Normal Rate] : normal rate  [Regular Rhythm] : with a regular rhythm [Normal S1, S2] : normal S1 and S2 [No Murmur] : no murmur heard [Soft] : abdomen soft [Non Tender] : non-tender [Non-distended] : non-distended [Normal Bowel Sounds] : normal bowel sounds [No CVA Tenderness] : no CVA  tenderness [No Spinal Tenderness] : no spinal tenderness [No Focal Deficits] : no focal deficits [Normal Affect] : the affect was normal [Alert and Oriented x3] : oriented to person, place, and time [Normal Insight/Judgement] : insight and judgment were intact [de-identified] : Trace non pitting edema of the right lower extremity above the foot, chronic stasis changes [de-identified] : no range of motion of the right hip, with extreme tenderness [de-identified] : 1 " LACERATION, NON BLEEDING , CLEAN DORSUM RT HAND [de-identified] : Wheelchair-bound.  Needs assist device.

## 2021-06-22 NOTE — REVIEW OF SYSTEMS
[Recent Change In Weight] : ~T recent weight change [Leg Claudication] : leg claudication [Lower Ext Edema] : lower extremity edema [Incontinence] : incontinence [Nocturia] : nocturia [Joint Pain] : joint pain [Joint Stiffness] : joint stiffness [Skin Rash] : skin rash [Unsteady Walking] : ataxia [Anxiety] : anxiety [Negative] : Heme/Lymph [Fever] : no fever [Chills] : no chills [Fatigue] : no fatigue [Hot Flashes] : no hot flashes [Night Sweats] : no night sweats [Chest Pain] : no chest pain [Palpitations] : no palpitations [Orthopnea] : no orthopnea [Paroxysmal Nocturnal Dyspnea] : no paroxysmal nocturnal dyspnea [Dysuria] : no dysuria [Hematuria] : no hematuria [Frequency] : no frequency [Vaginal Discharge] : no vaginal discharge [Joint Swelling] : no joint swelling [Muscle Weakness] : no muscle weakness [Muscle Pain] : no muscle pain [Back Pain] : no back pain [Itching] : no itching [Headache] : no headache [Dizziness] : no dizziness [Fainting] : no fainting [Confusion] : no confusion [Memory Loss] : no memory loss [Suicidal] : not suicidal [Insomnia] : no insomnia [Depression] : no depression [FreeTextEntry2] : SEE HPI [FreeTextEntry9] : RIGHT HIP [de-identified] : Chronic right lower extremity changes [de-identified] : Wheelchair-bound

## 2021-06-23 LAB
ALBUMIN SERPL ELPH-MCNC: 4.2 G/DL
ALP BLD-CCNC: 92 U/L
ALT SERPL-CCNC: 9 U/L
ANION GAP SERPL CALC-SCNC: 17 MMOL/L
AST SERPL-CCNC: 14 U/L
BILIRUB SERPL-MCNC: 0.2 MG/DL
BUN SERPL-MCNC: 41 MG/DL
CALCIUM SERPL-MCNC: 9.9 MG/DL
CHLORIDE SERPL-SCNC: 98 MMOL/L
CO2 SERPL-SCNC: 24 MMOL/L
CREAT SERPL-MCNC: 1.74 MG/DL
ESTIMATED AVERAGE GLUCOSE: 189 MG/DL
GLUCOSE SERPL-MCNC: 224 MG/DL
GLUCOSE SERPL-MCNC: 237 MG/DL
HBA1C MFR BLD HPLC: 8.2 %
MAGNESIUM SERPL-MCNC: 2.3 MG/DL
POTASSIUM SERPL-SCNC: 4.7 MMOL/L
PROT SERPL-MCNC: 7.5 G/DL
SODIUM SERPL-SCNC: 139 MMOL/L

## 2021-07-03 ENCOUNTER — RX RENEWAL (OUTPATIENT)
Age: 81
End: 2021-07-03

## 2021-07-07 ENCOUNTER — RX RENEWAL (OUTPATIENT)
Age: 81
End: 2021-07-07

## 2021-07-12 ENCOUNTER — OUTPATIENT (OUTPATIENT)
Dept: OUTPATIENT SERVICES | Facility: HOSPITAL | Age: 81
LOS: 1 days | End: 2021-07-12
Payer: MEDICARE

## 2021-07-12 VITALS
TEMPERATURE: 98 F | DIASTOLIC BLOOD PRESSURE: 61 MMHG | WEIGHT: 184.97 LBS | RESPIRATION RATE: 18 BRPM | HEART RATE: 64 BPM | HEIGHT: 61 IN | OXYGEN SATURATION: 97 % | SYSTOLIC BLOOD PRESSURE: 130 MMHG

## 2021-07-12 DIAGNOSIS — Z29.9 ENCOUNTER FOR PROPHYLACTIC MEASURES, UNSPECIFIED: ICD-10-CM

## 2021-07-12 DIAGNOSIS — Z98.89 OTHER SPECIFIED POSTPROCEDURAL STATES: Chronic | ICD-10-CM

## 2021-07-12 DIAGNOSIS — Z93.2 ILEOSTOMY STATUS: Chronic | ICD-10-CM

## 2021-07-12 DIAGNOSIS — M19.90 UNSPECIFIED OSTEOARTHRITIS, UNSPECIFIED SITE: ICD-10-CM

## 2021-07-12 DIAGNOSIS — I82.409 ACUTE EMBOLISM AND THROMBOSIS OF UNSPECIFIED DEEP VEINS OF UNSPECIFIED LOWER EXTREMITY: ICD-10-CM

## 2021-07-12 DIAGNOSIS — Z01.818 ENCOUNTER FOR OTHER PREPROCEDURAL EXAMINATION: ICD-10-CM

## 2021-07-12 DIAGNOSIS — Z90.49 ACQUIRED ABSENCE OF OTHER SPECIFIED PARTS OF DIGESTIVE TRACT: Chronic | ICD-10-CM

## 2021-07-12 DIAGNOSIS — Z90.11 ACQUIRED ABSENCE OF RIGHT BREAST AND NIPPLE: Chronic | ICD-10-CM

## 2021-07-12 DIAGNOSIS — M16.11 UNILATERAL PRIMARY OSTEOARTHRITIS, RIGHT HIP: ICD-10-CM

## 2021-07-12 DIAGNOSIS — I25.10 ATHEROSCLEROTIC HEART DISEASE OF NATIVE CORONARY ARTERY WITHOUT ANGINA PECTORIS: ICD-10-CM

## 2021-07-12 DIAGNOSIS — Z96.651 PRESENCE OF RIGHT ARTIFICIAL KNEE JOINT: Chronic | ICD-10-CM

## 2021-07-12 DIAGNOSIS — E11.9 TYPE 2 DIABETES MELLITUS WITHOUT COMPLICATIONS: ICD-10-CM

## 2021-07-12 LAB
A1C WITH ESTIMATED AVERAGE GLUCOSE RESULT: 8.1 % — HIGH (ref 4–5.6)
ANION GAP SERPL CALC-SCNC: 16 MMOL/L — SIGNIFICANT CHANGE UP (ref 5–17)
BLD GP AB SCN SERPL QL: NEGATIVE — SIGNIFICANT CHANGE UP
BUN SERPL-MCNC: 42 MG/DL — HIGH (ref 7–23)
CALCIUM SERPL-MCNC: 9.4 MG/DL — SIGNIFICANT CHANGE UP (ref 8.4–10.5)
CHLORIDE SERPL-SCNC: 99 MMOL/L — SIGNIFICANT CHANGE UP (ref 96–108)
CO2 SERPL-SCNC: 22 MMOL/L — SIGNIFICANT CHANGE UP (ref 22–31)
CREAT SERPL-MCNC: 1.68 MG/DL — HIGH (ref 0.5–1.3)
ESTIMATED AVERAGE GLUCOSE: 186 MG/DL — HIGH (ref 68–114)
GLUCOSE SERPL-MCNC: 181 MG/DL — HIGH (ref 70–99)
HCT VFR BLD CALC: 37.9 % — SIGNIFICANT CHANGE UP (ref 34.5–45)
HGB BLD-MCNC: 11.4 G/DL — LOW (ref 11.5–15.5)
MCHC RBC-ENTMCNC: 27.3 PG — SIGNIFICANT CHANGE UP (ref 27–34)
MCHC RBC-ENTMCNC: 30.1 GM/DL — LOW (ref 32–36)
MCV RBC AUTO: 90.9 FL — SIGNIFICANT CHANGE UP (ref 80–100)
MRSA PCR RESULT.: SIGNIFICANT CHANGE UP
NRBC # BLD: 0 /100 WBCS — SIGNIFICANT CHANGE UP (ref 0–0)
PLATELET # BLD AUTO: 329 K/UL — SIGNIFICANT CHANGE UP (ref 150–400)
POTASSIUM SERPL-MCNC: 4.2 MMOL/L — SIGNIFICANT CHANGE UP (ref 3.5–5.3)
POTASSIUM SERPL-SCNC: 4.2 MMOL/L — SIGNIFICANT CHANGE UP (ref 3.5–5.3)
RBC # BLD: 4.17 M/UL — SIGNIFICANT CHANGE UP (ref 3.8–5.2)
RBC # FLD: 16.9 % — HIGH (ref 10.3–14.5)
RH IG SCN BLD-IMP: POSITIVE — SIGNIFICANT CHANGE UP
S AUREUS DNA NOSE QL NAA+PROBE: SIGNIFICANT CHANGE UP
SODIUM SERPL-SCNC: 137 MMOL/L — SIGNIFICANT CHANGE UP (ref 135–145)
WBC # BLD: 8.2 K/UL — SIGNIFICANT CHANGE UP (ref 3.8–10.5)
WBC # FLD AUTO: 8.2 K/UL — SIGNIFICANT CHANGE UP (ref 3.8–10.5)

## 2021-07-12 PROCEDURE — 87641 MR-STAPH DNA AMP PROBE: CPT

## 2021-07-12 PROCEDURE — 80048 BASIC METABOLIC PNL TOTAL CA: CPT

## 2021-07-12 PROCEDURE — 86850 RBC ANTIBODY SCREEN: CPT

## 2021-07-12 PROCEDURE — 87640 STAPH A DNA AMP PROBE: CPT

## 2021-07-12 PROCEDURE — G0463: CPT

## 2021-07-12 PROCEDURE — 85027 COMPLETE CBC AUTOMATED: CPT

## 2021-07-12 PROCEDURE — 86900 BLOOD TYPING SEROLOGIC ABO: CPT

## 2021-07-12 PROCEDURE — 83036 HEMOGLOBIN GLYCOSYLATED A1C: CPT

## 2021-07-12 PROCEDURE — 86901 BLOOD TYPING SEROLOGIC RH(D): CPT

## 2021-07-12 RX ORDER — CHLORHEXIDINE GLUCONATE 213 G/1000ML
1 SOLUTION TOPICAL ONCE
Refills: 0 | Status: DISCONTINUED | OUTPATIENT
Start: 2021-07-22 | End: 2021-07-23

## 2021-07-12 RX ORDER — LIDOCAINE HCL 20 MG/ML
0.2 VIAL (ML) INJECTION ONCE
Refills: 0 | Status: DISCONTINUED | OUTPATIENT
Start: 2021-07-22 | End: 2021-07-28

## 2021-07-12 RX ORDER — ASPIRIN/CALCIUM CARB/MAGNESIUM 324 MG
0 TABLET ORAL
Qty: 0 | Refills: 0 | DISCHARGE

## 2021-07-12 RX ORDER — GENTAMICIN SULFATE 40 MG/ML
80 VIAL (ML) INJECTION ONCE
Refills: 0 | Status: DISCONTINUED | OUTPATIENT
Start: 2021-07-22 | End: 2021-07-22

## 2021-07-12 NOTE — H&P PST ADULT - ACTIVITY
able to walk around the house with her walker and can perform ADL's independently, can do dishes and cook meals

## 2021-07-12 NOTE — H&P PST ADULT - REASON FOR ADMISSION
As per daughter Shauna, is having a right hip replacement.  Patient goal after surgery: to be able to be self sufficient and to be able to walk.

## 2021-07-12 NOTE — H&P PST ADULT - HISTORY OF PRESENT ILLNESS
This is a 82 y/o Thai speaking female PMH right breast CA, S/P chemotherapy and right mastectomy 1989, colon CA, S/P chemo, radiation and colon resection, 2016, DVT of the lower extremities, which developed around the same time as the colon CA and colon resection, was likely immobile after surgery,  no IVC filter placed, is on Eliquis, CAD, S/P CABG X 2 11/09/2020.  The patient has a H/O osteoarthritis, S/P right total knee replacement, c/o right hip pain at least for the last 4 years, getting progressively worse, she was previously scheduled for right total hip replacement in March; however, had an acute on chronic exacerbation of CHF and was not cleared to proceed.  Presents today for right total hip replacement. This is an 80 y/o Chinese speaking female PMH right breast CA, S/P chemotherapy and right mastectomy 1989, lymphedema of the right upper extremity, colon CA, S/P chemo, radiation and colon resection, 2016, DVT of the lower extremities, which developed around the same time as the colon CA and colon resection, was likely immobile after surgery, no IVC filter placed, is on Eliquis, CAD, S/P CABG X 2 11/09/2020.  The patient has a H/O osteoarthritis, S/P right total knee replacement, c/o right hip pain at least for the last 4 years, getting progressively worse, she was previously scheduled for right total hip replacement in March; however, had an acute on chronic exacerbation of CHF and was not cleared to proceed.  The patient has been stable since treated for CHF in March.  Presents today for right total hip replacement.

## 2021-07-12 NOTE — H&P PST ADULT - HEALTH CARE MAINTENANCE
Sees PCP and specialists routinely for medical management, did not received COVID vaccine, received flu vaccine this season

## 2021-07-12 NOTE — H&P PST ADULT - NSICDXPROBLEM_GEN_ALL_CORE_FT
PROBLEM DIAGNOSES  Problem: Osteoarthritis  Assessment and Plan: Right total hip replacement    Problem: Need for prophylactic measure  Assessment and Plan: The Caprini score indicates that this patient is at high risk for a VTE event (score 6 or greater). Surgical patients in this group will benefit from both pharmacologic prophylaxis and intermittent compression devices.  The surgical team will determine the balance between VTE risk and bleeding risk, and other clinical considerations      Problem: Type 2 diabetes mellitus  Assessment and Plan: Instructed to hold glimepiride nd januvia on the morning of surgery      Problem: Deep vein thrombosis  Assessment and Plan: Instructed to hold Eliquis 3 days prior to surgery, last dose 7/16    Problem: CAD (coronary artery disease)  Assessment and Plan: Instructed to continue aspirin

## 2021-07-12 NOTE — H&P PST ADULT - ASSESSMENT
ANDREAI VTE 2.0 SCORE [CLOT updated 2019]    AGE RELATED RISK FACTORS                                                       MOBILITY RELATED FACTORS  [ ] Age 41-60 years                                            (1 Point)                    [ ] Bed rest                                                        (1 Point)  [ ] Age: 61-74 years                                           (2 Points)                  [ ] Plaster cast                                                   (2 Points)  [ x] Age= 75 years                                              (3 Points)                    [ ] Bed bound for more than 72 hours                 (2 Points)    DISEASE RELATED RISK FACTORS                                               GENDER SPECIFIC FACTORS  [x ] Edema in the lower extremities                       (1 Point)              [ ] Pregnancy                                                     (1 Point)  [ ] Varicose veins                                               (1 Point)                     [ ] Post-partum < 6 weeks                                   (1 Point)             [x ] BMI > 25 Kg/m2                                            (1 Point)                     [ ] Hormonal therapy  or oral contraception          (1 Point)                 [ ] Sepsis (in the previous month)                        (1 Point)               [ ] History of pregnancy complications                 (1 point)  [ ] Pneumonia or serious lung disease                                               [ ] Unexplained or recurrent                     (1 Point)           (in the previous month)                               (1 Point)  [ ] Abnormal pulmonary function test                     (1 Point)                 SURGERY RELATED RISK FACTORS  [ ] Acute myocardial infarction                              (1 Point)               [ ]  Section                                             (1 Point)  [ ] Congestive heart failure (in the previous month)  (1 Point)      [ ] Minor surgery                                                  (1 Point)   [ ] Inflammatory bowel disease                             (1 Point)               [ ] Arthroscopic surgery                                        (2 Points)  [ ] Central venous access                                      (2 Points)                [ ] General surgery lasting more than 45 minutes (2 points)  [x ] Malignancy- Present or previous                   (2 Points)                [x ] Elective arthroplasty                                         (5 points)    [ ] Stroke (in the previous month)                          (5 Points)                                                                                                                                                           HEMATOLOGY RELATED FACTORS                                                 TRAUMA RELATED RISK FACTORS  [ x] Prior episodes of VTE                                     (3 Points)                [ ] Fracture of the hip, pelvis, or leg                       (5 Points)  [ ] Positive family history for VTE                         (3 Points)             [ ] Acute spinal cord injury (in the previous month)  (5 Points)  [ ] Prothrombin 19150 A                                     (3 Points)               [ ] Paralysis  (less than 1 month)                             (5 Points)  [ ] Factor V Leiden                                             (3 Points)                  [ ] Multiple Trauma within 1 month                        (5 Points)  [ ] Lupus anticoagulants                                     (3 Points)                                                           [ ] Anticardiolipin antibodies                               (3 Points)                                                       [ ] High homocysteine in the blood                      (3 Points)                                             [ ] Other congenital or acquired thrombophilia      (3 Points)                                                [ ] Heparin induced thrombocytopenia                  (3 Points)                                     Total Score [  15        ]

## 2021-07-15 ENCOUNTER — APPOINTMENT (OUTPATIENT)
Dept: INTERNAL MEDICINE | Facility: CLINIC | Age: 81
End: 2021-07-15
Payer: MEDICARE

## 2021-07-15 VITALS
OXYGEN SATURATION: 94 % | TEMPERATURE: 97.7 F | RESPIRATION RATE: 12 BRPM | HEIGHT: 60 IN | SYSTOLIC BLOOD PRESSURE: 121 MMHG | DIASTOLIC BLOOD PRESSURE: 69 MMHG | HEART RATE: 59 BPM

## 2021-07-15 DIAGNOSIS — M16.10 UNILATERAL PRIMARY OSTEOARTHRITIS, UNSPECIFIED HIP: ICD-10-CM

## 2021-07-15 PROCEDURE — 99214 OFFICE O/P EST MOD 30 MIN: CPT

## 2021-07-15 RX ORDER — DOXYCYCLINE 100 MG/1
100 CAPSULE ORAL
Qty: 20 | Refills: 1 | Status: DISCONTINUED | COMMUNITY
Start: 2021-06-22 | End: 2021-07-15

## 2021-07-15 NOTE — REVIEW OF SYSTEMS
[Recent Change In Weight] : ~T recent weight change [Leg Claudication] : leg claudication [Nocturia] : nocturia [Frequency] : frequency [Joint Pain] : joint pain [Joint Stiffness] : joint stiffness [Skin Rash] : skin rash [Unsteady Walking] : ataxia [Anxiety] : anxiety [Negative] : Heme/Lymph [Fever] : no fever [Chills] : no chills [Fatigue] : no fatigue [Hot Flashes] : no hot flashes [Night Sweats] : no night sweats [Chest Pain] : no chest pain [Palpitations] : no palpitations [Lower Ext Edema] : no lower extremity edema [Orthopnea] : no orthopnea [Paroxysmal Nocturnal Dyspnea] : no paroxysmal nocturnal dyspnea [Dysuria] : no dysuria [Incontinence] : no incontinence [Hematuria] : no hematuria [Vaginal Discharge] : no vaginal discharge [Joint Swelling] : no joint swelling [Muscle Weakness] : no muscle weakness [Muscle Pain] : no muscle pain [Back Pain] : no back pain [Itching] : no itching [Headache] : no headache [Dizziness] : no dizziness [Fainting] : no fainting [Confusion] : no confusion [Memory Loss] : no memory loss [Suicidal] : not suicidal [Insomnia] : no insomnia [Depression] : no depression [FreeTextEntry2] : SEE HPI [FreeTextEntry9] : RIGHT HIP [de-identified] : Chronic right lower extremity changes [de-identified] : Wheelchair-bound

## 2021-07-15 NOTE — PHYSICAL EXAM
[No Acute Distress] : no acute distress [Well Nourished] : well nourished [Well Developed] : well developed [Normal Sclera/Conjunctiva] : normal sclera/conjunctiva [No JVD] : no jugular venous distention [No Lymphadenopathy] : no lymphadenopathy [Supple] : supple [Thyroid Normal, No Nodules] : the thyroid was normal and there were no nodules present [No Respiratory Distress] : no respiratory distress  [No Accessory Muscle Use] : no accessory muscle use [Clear to Auscultation] : lungs were clear to auscultation bilaterally [Normal Rate] : normal rate  [Regular Rhythm] : with a regular rhythm [Normal S1, S2] : normal S1 and S2 [No Murmur] : no murmur heard [Soft] : abdomen soft [Non Tender] : non-tender [Non-distended] : non-distended [Normal Bowel Sounds] : normal bowel sounds [No CVA Tenderness] : no CVA  tenderness [No Spinal Tenderness] : no spinal tenderness [No Focal Deficits] : no focal deficits [Normal Affect] : the affect was normal [Alert and Oriented x3] : oriented to person, place, and time [Normal Insight/Judgement] : insight and judgment were intact [de-identified] : Trace non pitting edema of the right lower extremity above the foot, chronic stasis changes [de-identified] : no range of motion of the right hip, with extreme tenderness [de-identified] : Wheelchair-bound.  Needs assist device.

## 2021-07-15 NOTE — RESULTS/DATA
[] : not indicated [ECG Reviewed] : reviewed [No Acute Ischemia] : No acute ischemia [NSR] : normal sinus rhythm [Poor R Wave Progression] : poor R-wave progresion [No Interval Change] : no interval change

## 2021-07-15 NOTE — HISTORY OF PRESENT ILLNESS
[Coronary Artery Disease] : coronary artery disease [No Pertinent Pulmonary History] : no history of asthma, COPD, sleep apnea, or smoking [No Adverse Anesthesia Reaction] : no adverse anesthesia reaction in self or family member [Chronic Anticoagulation] : chronic anticoagulation [Chronic Kidney Disease] : chronic kidney disease [Diabetes] : diabetes [(Patient denies any chest pain, claudication, dyspnea on exertion, orthopnea, palpitations or syncope)] : Patient denies any chest pain, claudication, dyspnea on exertion, orthopnea, palpitations or syncope [FreeTextEntry1] : RT THR [FreeTextEntry2] : 07/22/21 [FreeTextEntry3] : DR CURTIS ALVAREZ [FreeTextEntry5] : COMPENSATED CHF

## 2021-07-15 NOTE — ASSESSMENT
[Patient Optimized for Surgery] : Patient optimized for surgery [No Further Testing Recommended] : no further testing recommended [Modify medications prior to procedure] : Modify medications prior to procedure [As per surgery] : as per surgery [FreeTextEntry7] : HOLD ELIQUIS 3 DAYS BEFORE SX

## 2021-07-19 ENCOUNTER — OUTPATIENT (OUTPATIENT)
Dept: OUTPATIENT SERVICES | Facility: HOSPITAL | Age: 81
LOS: 1 days | End: 2021-07-19

## 2021-07-19 DIAGNOSIS — Z90.49 ACQUIRED ABSENCE OF OTHER SPECIFIED PARTS OF DIGESTIVE TRACT: Chronic | ICD-10-CM

## 2021-07-19 DIAGNOSIS — Z96.651 PRESENCE OF RIGHT ARTIFICIAL KNEE JOINT: Chronic | ICD-10-CM

## 2021-07-19 DIAGNOSIS — Z98.89 OTHER SPECIFIED POSTPROCEDURAL STATES: Chronic | ICD-10-CM

## 2021-07-19 DIAGNOSIS — Z93.2 ILEOSTOMY STATUS: Chronic | ICD-10-CM

## 2021-07-19 DIAGNOSIS — Z90.11 ACQUIRED ABSENCE OF RIGHT BREAST AND NIPPLE: Chronic | ICD-10-CM

## 2021-07-19 DIAGNOSIS — Z11.52 ENCOUNTER FOR SCREENING FOR COVID-19: ICD-10-CM

## 2021-07-19 LAB — SARS-COV-2 RNA SPEC QL NAA+PROBE: SIGNIFICANT CHANGE UP

## 2021-07-21 ENCOUNTER — TRANSCRIPTION ENCOUNTER (OUTPATIENT)
Age: 81
End: 2021-07-21

## 2021-07-22 ENCOUNTER — INPATIENT (INPATIENT)
Facility: HOSPITAL | Age: 81
LOS: 5 days | Discharge: NURSING, REHAB OR LONG-TERM CARE | DRG: 470 | End: 2021-07-28
Attending: ORTHOPAEDIC SURGERY | Admitting: ORTHOPAEDIC SURGERY
Payer: MEDICARE

## 2021-07-22 ENCOUNTER — APPOINTMENT (OUTPATIENT)
Dept: ORTHOPEDIC SURGERY | Facility: HOSPITAL | Age: 81
End: 2021-07-22

## 2021-07-22 VITALS
HEART RATE: 66 BPM | SYSTOLIC BLOOD PRESSURE: 108 MMHG | DIASTOLIC BLOOD PRESSURE: 64 MMHG | TEMPERATURE: 98 F | WEIGHT: 184.97 LBS | HEIGHT: 61 IN | RESPIRATION RATE: 16 BRPM

## 2021-07-22 DIAGNOSIS — M16.11 UNILATERAL PRIMARY OSTEOARTHRITIS, RIGHT HIP: ICD-10-CM

## 2021-07-22 DIAGNOSIS — Z90.11 ACQUIRED ABSENCE OF RIGHT BREAST AND NIPPLE: Chronic | ICD-10-CM

## 2021-07-22 DIAGNOSIS — Z93.2 ILEOSTOMY STATUS: Chronic | ICD-10-CM

## 2021-07-22 DIAGNOSIS — Z96.651 PRESENCE OF RIGHT ARTIFICIAL KNEE JOINT: Chronic | ICD-10-CM

## 2021-07-22 DIAGNOSIS — Z90.49 ACQUIRED ABSENCE OF OTHER SPECIFIED PARTS OF DIGESTIVE TRACT: Chronic | ICD-10-CM

## 2021-07-22 DIAGNOSIS — Z98.89 OTHER SPECIFIED POSTPROCEDURAL STATES: Chronic | ICD-10-CM

## 2021-07-22 LAB
ANION GAP SERPL CALC-SCNC: 11 MMOL/L — SIGNIFICANT CHANGE UP (ref 5–17)
BASOPHILS # BLD AUTO: 0.02 K/UL
BASOPHILS NFR BLD AUTO: 0.3 %
BUN SERPL-MCNC: 28 MG/DL — HIGH (ref 7–23)
CALCIUM SERPL-MCNC: 9.1 MG/DL — SIGNIFICANT CHANGE UP (ref 8.4–10.5)
CHLORIDE SERPL-SCNC: 100 MMOL/L — SIGNIFICANT CHANGE UP (ref 96–108)
CO2 SERPL-SCNC: 24 MMOL/L — SIGNIFICANT CHANGE UP (ref 22–31)
CREAT SERPL-MCNC: 1.74 MG/DL — HIGH (ref 0.5–1.3)
EOSINOPHIL # BLD AUTO: 0.04 K/UL
EOSINOPHIL NFR BLD AUTO: 0.6 %
GLUCOSE BLDC GLUCOMTR-MCNC: 109 MG/DL — HIGH (ref 70–99)
GLUCOSE BLDC GLUCOMTR-MCNC: 175 MG/DL — HIGH (ref 70–99)
GLUCOSE BLDC GLUCOMTR-MCNC: 202 MG/DL — HIGH (ref 70–99)
GLUCOSE BLDC GLUCOMTR-MCNC: 268 MG/DL — HIGH (ref 70–99)
GLUCOSE SERPL-MCNC: 180 MG/DL — HIGH (ref 70–99)
HCT VFR BLD CALC: 36.8 % — SIGNIFICANT CHANGE UP (ref 34.5–45)
HCT VFR BLD CALC: 39.5 %
HGB BLD-MCNC: 11.1 G/DL — LOW (ref 11.5–15.5)
HGB BLD-MCNC: 11.7 G/DL
IMM GRANULOCYTES NFR BLD AUTO: 0.1 %
LYMPHOCYTES # BLD AUTO: 0.87 K/UL
LYMPHOCYTES NFR BLD AUTO: 12.2 %
MAN DIFF?: NORMAL
MCHC RBC-ENTMCNC: 26.8 PG
MCHC RBC-ENTMCNC: 28.1 PG — SIGNIFICANT CHANGE UP (ref 27–34)
MCHC RBC-ENTMCNC: 29.6 GM/DL
MCHC RBC-ENTMCNC: 30.2 GM/DL — LOW (ref 32–36)
MCV RBC AUTO: 90.6 FL
MCV RBC AUTO: 93.2 FL — SIGNIFICANT CHANGE UP (ref 80–100)
MONOCYTES # BLD AUTO: 0.55 K/UL
MONOCYTES NFR BLD AUTO: 7.7 %
NEUTROPHILS # BLD AUTO: 5.65 K/UL
NEUTROPHILS NFR BLD AUTO: 79.1 %
NRBC # BLD: 0 /100 WBCS — SIGNIFICANT CHANGE UP (ref 0–0)
PLATELET # BLD AUTO: 259 K/UL — SIGNIFICANT CHANGE UP (ref 150–400)
PLATELET # BLD AUTO: 289 K/UL
POTASSIUM SERPL-MCNC: 4.5 MMOL/L — SIGNIFICANT CHANGE UP (ref 3.5–5.3)
POTASSIUM SERPL-SCNC: 4.5 MMOL/L — SIGNIFICANT CHANGE UP (ref 3.5–5.3)
RBC # BLD: 3.95 M/UL — SIGNIFICANT CHANGE UP (ref 3.8–5.2)
RBC # BLD: 4.36 M/UL
RBC # FLD: 16.9 % — HIGH (ref 10.3–14.5)
RBC # FLD: 17.7 %
SODIUM SERPL-SCNC: 135 MMOL/L — SIGNIFICANT CHANGE UP (ref 135–145)
WBC # BLD: 11.18 K/UL — HIGH (ref 3.8–10.5)
WBC # FLD AUTO: 11.18 K/UL — HIGH (ref 3.8–10.5)
WBC # FLD AUTO: 7.14 K/UL

## 2021-07-22 PROCEDURE — 27130 TOTAL HIP ARTHROPLASTY: CPT | Mod: RT

## 2021-07-22 PROCEDURE — 72170 X-RAY EXAM OF PELVIS: CPT | Mod: 26

## 2021-07-22 RX ORDER — ACETAMINOPHEN 500 MG
1000 TABLET ORAL ONCE
Refills: 0 | Status: COMPLETED | OUTPATIENT
Start: 2021-07-22 | End: 2021-07-22

## 2021-07-22 RX ORDER — POLYETHYLENE GLYCOL 3350 17 G/17G
17 POWDER, FOR SOLUTION ORAL AT BEDTIME
Refills: 0 | Status: DISCONTINUED | OUTPATIENT
Start: 2021-07-22 | End: 2021-07-28

## 2021-07-22 RX ORDER — SODIUM CHLORIDE 9 MG/ML
3 INJECTION INTRAMUSCULAR; INTRAVENOUS; SUBCUTANEOUS EVERY 8 HOURS
Refills: 0 | Status: DISCONTINUED | OUTPATIENT
Start: 2021-07-22 | End: 2021-07-22

## 2021-07-22 RX ORDER — SODIUM CHLORIDE 9 MG/ML
1000 INJECTION, SOLUTION INTRAVENOUS
Refills: 0 | Status: DISCONTINUED | OUTPATIENT
Start: 2021-07-22 | End: 2021-07-28

## 2021-07-22 RX ORDER — SODIUM CHLORIDE 9 MG/ML
500 INJECTION, SOLUTION INTRAVENOUS ONCE
Refills: 0 | Status: COMPLETED | OUTPATIENT
Start: 2021-07-22 | End: 2021-07-22

## 2021-07-22 RX ORDER — SPIRONOLACTONE 25 MG/1
12.5 TABLET, FILM COATED ORAL DAILY
Refills: 0 | Status: DISCONTINUED | OUTPATIENT
Start: 2021-07-22 | End: 2021-07-28

## 2021-07-22 RX ORDER — APIXABAN 2.5 MG/1
2.5 TABLET, FILM COATED ORAL
Refills: 0 | Status: DISCONTINUED | OUTPATIENT
Start: 2021-07-23 | End: 2021-07-28

## 2021-07-22 RX ORDER — HYDROMORPHONE HYDROCHLORIDE 2 MG/ML
0.25 INJECTION INTRAMUSCULAR; INTRAVENOUS; SUBCUTANEOUS
Refills: 0 | Status: DISCONTINUED | OUTPATIENT
Start: 2021-07-22 | End: 2021-07-22

## 2021-07-22 RX ORDER — PANTOPRAZOLE SODIUM 20 MG/1
40 TABLET, DELAYED RELEASE ORAL
Refills: 0 | Status: DISCONTINUED | OUTPATIENT
Start: 2021-07-22 | End: 2021-07-28

## 2021-07-22 RX ORDER — ACETAMINOPHEN 500 MG
975 TABLET ORAL EVERY 8 HOURS
Refills: 0 | Status: DISCONTINUED | OUTPATIENT
Start: 2021-07-23 | End: 2021-07-24

## 2021-07-22 RX ORDER — INSULIN LISPRO 100/ML
VIAL (ML) SUBCUTANEOUS AT BEDTIME
Refills: 0 | Status: DISCONTINUED | OUTPATIENT
Start: 2021-07-22 | End: 2021-07-28

## 2021-07-22 RX ORDER — ATORVASTATIN CALCIUM 80 MG/1
40 TABLET, FILM COATED ORAL AT BEDTIME
Refills: 0 | Status: DISCONTINUED | OUTPATIENT
Start: 2021-07-22 | End: 2021-07-28

## 2021-07-22 RX ORDER — GABAPENTIN 400 MG/1
600 CAPSULE ORAL DAILY
Refills: 0 | Status: DISCONTINUED | OUTPATIENT
Start: 2021-07-22 | End: 2021-07-26

## 2021-07-22 RX ORDER — BENZOCAINE AND MENTHOL 5; 1 G/100ML; G/100ML
1 LIQUID ORAL
Refills: 0 | Status: DISCONTINUED | OUTPATIENT
Start: 2021-07-22 | End: 2021-07-28

## 2021-07-22 RX ORDER — GLUCAGON INJECTION, SOLUTION 0.5 MG/.1ML
1 INJECTION, SOLUTION SUBCUTANEOUS ONCE
Refills: 0 | Status: DISCONTINUED | OUTPATIENT
Start: 2021-07-22 | End: 2021-07-28

## 2021-07-22 RX ORDER — MAGNESIUM HYDROXIDE 400 MG/1
30 TABLET, CHEWABLE ORAL DAILY
Refills: 0 | Status: DISCONTINUED | OUTPATIENT
Start: 2021-07-22 | End: 2021-07-28

## 2021-07-22 RX ORDER — ASPIRIN/CALCIUM CARB/MAGNESIUM 324 MG
81 TABLET ORAL DAILY
Refills: 0 | Status: DISCONTINUED | OUTPATIENT
Start: 2021-07-22 | End: 2021-07-28

## 2021-07-22 RX ORDER — TRAMADOL HYDROCHLORIDE 50 MG/1
50 TABLET ORAL ONCE
Refills: 0 | Status: DISCONTINUED | OUTPATIENT
Start: 2021-07-22 | End: 2021-07-22

## 2021-07-22 RX ORDER — PANTOPRAZOLE SODIUM 20 MG/1
40 TABLET, DELAYED RELEASE ORAL ONCE
Refills: 0 | Status: COMPLETED | OUTPATIENT
Start: 2021-07-22 | End: 2021-07-22

## 2021-07-22 RX ORDER — TRAMADOL HYDROCHLORIDE 50 MG/1
50 TABLET ORAL EVERY 6 HOURS
Refills: 0 | Status: DISCONTINUED | OUTPATIENT
Start: 2021-07-22 | End: 2021-07-24

## 2021-07-22 RX ORDER — DEXTROSE 50 % IN WATER 50 %
15 SYRINGE (ML) INTRAVENOUS ONCE
Refills: 0 | Status: DISCONTINUED | OUTPATIENT
Start: 2021-07-22 | End: 2021-07-28

## 2021-07-22 RX ORDER — ACETAMINOPHEN 500 MG
1000 TABLET ORAL ONCE
Refills: 0 | Status: DISCONTINUED | OUTPATIENT
Start: 2021-07-22 | End: 2021-07-22

## 2021-07-22 RX ORDER — SODIUM CHLORIDE 9 MG/ML
75 INJECTION, SOLUTION INTRAVENOUS
Refills: 0 | Status: DISCONTINUED | OUTPATIENT
Start: 2021-07-22 | End: 2021-07-22

## 2021-07-22 RX ORDER — OXYCODONE HYDROCHLORIDE 5 MG/1
10 TABLET ORAL EVERY 4 HOURS
Refills: 0 | Status: DISCONTINUED | OUTPATIENT
Start: 2021-07-22 | End: 2021-07-24

## 2021-07-22 RX ORDER — SODIUM CHLORIDE 9 MG/ML
500 INJECTION, SOLUTION INTRAVENOUS ONCE
Refills: 0 | Status: COMPLETED | OUTPATIENT
Start: 2021-07-23 | End: 2021-07-23

## 2021-07-22 RX ORDER — AMLODIPINE BESYLATE 2.5 MG/1
5 TABLET ORAL DAILY
Refills: 0 | Status: DISCONTINUED | OUTPATIENT
Start: 2021-07-22 | End: 2021-07-28

## 2021-07-22 RX ORDER — DEXTROSE 50 % IN WATER 50 %
25 SYRINGE (ML) INTRAVENOUS ONCE
Refills: 0 | Status: DISCONTINUED | OUTPATIENT
Start: 2021-07-22 | End: 2021-07-28

## 2021-07-22 RX ORDER — INSULIN LISPRO 100/ML
VIAL (ML) SUBCUTANEOUS
Refills: 0 | Status: DISCONTINUED | OUTPATIENT
Start: 2021-07-22 | End: 2021-07-28

## 2021-07-22 RX ORDER — OXYCODONE HYDROCHLORIDE 5 MG/1
5 TABLET ORAL ONCE
Refills: 0 | Status: DISCONTINUED | OUTPATIENT
Start: 2021-07-22 | End: 2021-07-22

## 2021-07-22 RX ORDER — ACETAMINOPHEN 500 MG
1000 TABLET ORAL ONCE
Refills: 0 | Status: COMPLETED | OUTPATIENT
Start: 2021-07-23 | End: 2021-07-23

## 2021-07-22 RX ORDER — FAMOTIDINE 10 MG/ML
20 INJECTION INTRAVENOUS DAILY
Refills: 0 | Status: DISCONTINUED | OUTPATIENT
Start: 2021-07-22 | End: 2021-07-22

## 2021-07-22 RX ORDER — BUMETANIDE 0.25 MG/ML
1 INJECTION INTRAMUSCULAR; INTRAVENOUS
Refills: 0 | Status: DISCONTINUED | OUTPATIENT
Start: 2021-07-22 | End: 2021-07-28

## 2021-07-22 RX ORDER — ONDANSETRON 8 MG/1
4 TABLET, FILM COATED ORAL EVERY 6 HOURS
Refills: 0 | Status: DISCONTINUED | OUTPATIENT
Start: 2021-07-22 | End: 2021-07-28

## 2021-07-22 RX ORDER — OXYCODONE HYDROCHLORIDE 5 MG/1
5 TABLET ORAL EVERY 4 HOURS
Refills: 0 | Status: DISCONTINUED | OUTPATIENT
Start: 2021-07-22 | End: 2021-07-24

## 2021-07-22 RX ORDER — DEXTROSE 50 % IN WATER 50 %
12.5 SYRINGE (ML) INTRAVENOUS ONCE
Refills: 0 | Status: DISCONTINUED | OUTPATIENT
Start: 2021-07-22 | End: 2021-07-28

## 2021-07-22 RX ORDER — VANCOMYCIN HCL 1 G
1000 VIAL (EA) INTRAVENOUS ONCE
Refills: 0 | Status: COMPLETED | OUTPATIENT
Start: 2021-07-22 | End: 2021-07-22

## 2021-07-22 RX ORDER — SENNA PLUS 8.6 MG/1
2 TABLET ORAL AT BEDTIME
Refills: 0 | Status: DISCONTINUED | OUTPATIENT
Start: 2021-07-22 | End: 2021-07-28

## 2021-07-22 RX ORDER — METOPROLOL TARTRATE 50 MG
150 TABLET ORAL DAILY
Refills: 0 | Status: DISCONTINUED | OUTPATIENT
Start: 2021-07-22 | End: 2021-07-28

## 2021-07-22 RX ORDER — CEFAZOLIN SODIUM 1 G
2000 VIAL (EA) INJECTION EVERY 8 HOURS
Refills: 0 | Status: DISCONTINUED | OUTPATIENT
Start: 2021-07-22 | End: 2021-07-22

## 2021-07-22 RX ORDER — VANCOMYCIN HCL 1 G
1250 VIAL (EA) INTRAVENOUS ONCE
Refills: 0 | Status: COMPLETED | OUTPATIENT
Start: 2021-07-22 | End: 2021-07-22

## 2021-07-22 RX ADMIN — SODIUM CHLORIDE 150 MILLILITER(S): 9 INJECTION, SOLUTION INTRAVENOUS at 11:09

## 2021-07-22 RX ADMIN — Medication 1: at 11:17

## 2021-07-22 RX ADMIN — HYDROMORPHONE HYDROCHLORIDE 0.25 MILLIGRAM(S): 2 INJECTION INTRAMUSCULAR; INTRAVENOUS; SUBCUTANEOUS at 12:15

## 2021-07-22 RX ADMIN — Medication 400 MILLIGRAM(S): at 17:07

## 2021-07-22 RX ADMIN — Medication 166.67 MILLIGRAM(S): at 06:58

## 2021-07-22 RX ADMIN — Medication 1: at 23:16

## 2021-07-22 RX ADMIN — Medication 150 MILLIGRAM(S): at 06:23

## 2021-07-22 RX ADMIN — OXYCODONE HYDROCHLORIDE 5 MILLIGRAM(S): 5 TABLET ORAL at 13:39

## 2021-07-22 RX ADMIN — POLYETHYLENE GLYCOL 3350 17 GRAM(S): 17 POWDER, FOR SOLUTION ORAL at 22:56

## 2021-07-22 RX ADMIN — SODIUM CHLORIDE 500 MILLILITER(S): 9 INJECTION, SOLUTION INTRAVENOUS at 10:59

## 2021-07-22 RX ADMIN — Medication 2: at 17:07

## 2021-07-22 RX ADMIN — HYDROMORPHONE HYDROCHLORIDE 0.25 MILLIGRAM(S): 2 INJECTION INTRAMUSCULAR; INTRAVENOUS; SUBCUTANEOUS at 12:04

## 2021-07-22 RX ADMIN — BUMETANIDE 1 MILLIGRAM(S): 0.25 INJECTION INTRAMUSCULAR; INTRAVENOUS at 18:50

## 2021-07-22 RX ADMIN — OXYCODONE HYDROCHLORIDE 5 MILLIGRAM(S): 5 TABLET ORAL at 12:56

## 2021-07-22 RX ADMIN — HYDROMORPHONE HYDROCHLORIDE 0.25 MILLIGRAM(S): 2 INJECTION INTRAMUSCULAR; INTRAVENOUS; SUBCUTANEOUS at 11:10

## 2021-07-22 RX ADMIN — SODIUM CHLORIDE 80 MILLILITER(S): 9 INJECTION, SOLUTION INTRAVENOUS at 14:51

## 2021-07-22 RX ADMIN — Medication 250 MILLIGRAM(S): at 20:18

## 2021-07-22 RX ADMIN — SODIUM CHLORIDE 80 MILLILITER(S): 9 INJECTION, SOLUTION INTRAVENOUS at 22:56

## 2021-07-22 RX ADMIN — SODIUM CHLORIDE 500 MILLILITER(S): 9 INJECTION, SOLUTION INTRAVENOUS at 14:50

## 2021-07-22 RX ADMIN — PANTOPRAZOLE SODIUM 40 MILLIGRAM(S): 20 TABLET, DELAYED RELEASE ORAL at 06:23

## 2021-07-22 RX ADMIN — Medication 81 MILLIGRAM(S): at 12:56

## 2021-07-22 RX ADMIN — TRAMADOL HYDROCHLORIDE 50 MILLIGRAM(S): 50 TABLET ORAL at 06:23

## 2021-07-22 RX ADMIN — HYDROMORPHONE HYDROCHLORIDE 0.25 MILLIGRAM(S): 2 INJECTION INTRAMUSCULAR; INTRAVENOUS; SUBCUTANEOUS at 11:30

## 2021-07-22 RX ADMIN — Medication 1000 MILLIGRAM(S): at 17:22

## 2021-07-22 RX ADMIN — SENNA PLUS 2 TABLET(S): 8.6 TABLET ORAL at 22:55

## 2021-07-22 RX ADMIN — GABAPENTIN 600 MILLIGRAM(S): 400 CAPSULE ORAL at 22:55

## 2021-07-22 RX ADMIN — ATORVASTATIN CALCIUM 40 MILLIGRAM(S): 80 TABLET, FILM COATED ORAL at 22:55

## 2021-07-22 NOTE — PHYSICAL THERAPY INITIAL EVALUATION ADULT - ADDITIONAL COMMENTS
Pt lives in house with son, 3 steps to enter with BL HR. PTA, pt was I with all ADLs and functional mobility with use of RW.

## 2021-07-22 NOTE — CHART NOTE - NSCHARTNOTEFT_GEN_A_CORE
Patient visited bedside in PACU, patient with c/o left sided chest disomfort with deep breath.  No c/o SOB, N/V.    T(C): 36 (07-22-21 @ 10:30), Max: 36.8 (07-22-21 @ 06:37)  HR: 62 (07-22-21 @ 12:00) (62 - 69)  BP: 128/60 (07-22-21 @ 12:00) (91/50 - 141/63)  RR: 13 (07-22-21 @ 12:00) (11 - 22)  SpO2: 94% (07-22-21 @ 12:00) (94% - 99%)  Wt(kg): --    Exam:  Alert and [place], No Acute Distress  Card: +S1/S2, RRR  Reproducible tenderess to left chest and ribs under breast region most likely secondary to patient positioning in the OR  Pulm: CTAB  Laterality: R Hip  Dressing: Aquacel c/d/i  Calves soft, non-tender bilaterally  +PF/DF/EHL/FHL  SILT  + DP pulse    Xray:   IMPRESSION:  Cementless right total hip prosthesis with screw fixated acetabular cup implanted.    Intact and aligned hardware and no periprosthetic fractures.    Postoperative soft tissue changes.    Correlate with intraoperative findings.    Generalized osteopenia, lower lumbar spine degenerative change, and right groin surgical clips again noted. Unremarkable left hip.    --- End of Report ---                              11.1   11.18 )-----------( 259      ( 22 Jul 2021 11:05 )             36.8    07-22    135  |  100  |  28<H>  ----------------------------<  180<H>  4.5   |  24  |  1.74<H>    Ca    9.1      22 Jul 2021 11:05        A/P: 81y Female S/p R Total Hip Arthroplasty. VSS. NAD  -Chest discomfort: Reproducible with palpation, most likely costochondritis, will continue to monitor  -PT/OT-WBAT RLE With Posterior Hip Precautions, Hip Abduction pillow while in bed  -IS  -DVT PPx: Eliquis 2.5mg PO BID, ASA 81mg PO Daily and SCDs  -Pain Control  -Continue Current Tx  -Dispo planning PACU to Floor    Hiren Seay PA-C  Orthopedic Surgery Team  Team Pager #[phone]

## 2021-07-22 NOTE — OCCUPATIONAL THERAPY INITIAL EVALUATION ADULT - PERSONAL SAFETY AND JUDGMENT, REHAB EVAL
31 F no PMH here with worsening LUQ pain, previously was intermittent then became constant last night associated with nausea, vomiting. No fevers, chills. no constipation, diarrhea. Saw GI, had U/S and labs but has not had a follow up visit. Strong family history of gallstones. Taking Omeprazole at home without relief. No surgical history.
intact

## 2021-07-22 NOTE — PRE-ANESTHESIA EVALUATION ADULT - NSANTHNECKRD_ENT_A_CORE
[facility] Internal Medicine   Progress Note              Subjective:  Patient seen at 1 pm.  Pt reports she felt better after ng tube and hassan were dc.  Still has some incisional abdominal pain but not bad. No nausea or emesis,  No sob or cp         I/O's    Intake/Output Summary (Last 24 hours) at 1/9/2021 1627  Last data filed at 1/9/2021 0700  Gross per 24 hour   Intake 1157.44 ml   Output 1650 ml   Net -492.56 ml           ALLERGIES:  Lisinopril and Losartan     [facility] Meds  Current Facility-Administered Medications   Medication Dose Route Frequency Provider Last Rate Last Admin   • sodium phosphate 45 mmol in sodium chloride 0.9 % 500 mL IVPB  45 mmol Intravenous Once Rena Leon PA-C 62.5 mL/hr at 01/09/21 1142 45 mmol at 01/09/21 1142   • acetaminophen (TYLENOL) tablet 1,000 mg  1,000 mg Oral 3 times per day Mark Vasquez MD   1,000 mg at 01/08/21 0704   • sodium chloride 0.9 % flush bag 25 mL  25 mL Intravenous PRN Mark Vasquez MD       • sodium chloride (PF) 0.9 % injection 2 mL  2 mL Intracatheter 2 times per day Mark Vasquez MD   2 mL at 01/09/21 0912   • enoxaparin (LOVENOX) injection 40 mg  40 mg Subcutaneous Daily Mark Vasquez MD   40 mg at 01/09/21 0912   • Potassium Standard Replacement Protocol   Does not apply See Admin Instructions Mark Vasquez MD       • Magnesium Standard Replacement Protocol   Does not apply See Admin Instructions Mark Vasquez MD       • Phosphorus Standard Replacement Protocol   Does not apply See Admin Instructions Mark Vasquez MD       • ondansetron (ZOFRAN ODT) disintegrating tablet 4 mg  4 mg Oral Q12H PRN Mark Vasquez MD        Or   • ondansetron (ZOFRAN) injection 4 mg  4 mg Intravenous Q12H PRN Mark Vasquez MD       • cefTRIAXone (ROCEPHIN) syringe 2,000 mg  2,000 mg Intravenous Daily Mark Vasquez MD   2,000 mg at 01/09/21 0912   • metroNIDAZOLE (FLAGYL) IVPB 500 mg  500 mg Intravenous 3 times per day Mark Vasquez  mL/hr at 01/09/21 1420 500 mg at 01/09/21 1420    • nalbuphine (NUBAIN) injection 2.5 mg  2.5 mg Intravenous Q8H PRN Rena Leon PA-C       • naLOXone (NARCAN) injection 0.1 mg  0.1 mg Intravenous PRN Rena Leon PA-C       • sodium chloride 0.9% infusion   Intravenous Continuous Kecia Lyons  mL/hr at 01/09/21 0123 Restarted at 01/09/21 0123   • HYDROmorphone (DILAUDID) 0.2 mg/mL in sodium chl 0.9% 30 mL PCA infusion   Intravenous Continuous Rena Leon PA-C   New Bag at 01/08/21 0920   • dextrose 50 % injection 25 g  25 g Intravenous PRN Kecia Lyons MD       • dextrose 50 % injection 12.5 g  12.5 g Intravenous PRN Kecia Lyons MD       • glucagon (GLUCAGEN) injection 1 mg  1 mg Intramuscular PRN Kecia Lyons MD       • dextrose (GLUTOSE) 40 % gel 15 g  15 g Oral PRN Kecia Lyons MD       • dextrose (GLUTOSE) 40 % gel 30 g  30 g Oral PRN Kecia Lyons MD       • insulin lispro (HumaLOG) scheduled dose AND correction dose   Subcutaneous 4 times per day Kecia Lyons MD   1 Units at 01/09/21 1144   • insulin glargine (LANTUS) injection 10 Units  10 Units Subcutaneous Nightly Kecia Lyons MD   10 Units at 01/08/21 2138   • [MAR Hold] lactated ringers infusion   Intravenous Continuous Rocio Sousa            Last Recorded Vitals    SpO2 Readings from Last 3 Encounters:   01/09/21 98%   11/07/20 99%   11/02/20 99%        VITAL SIGNS:     Vital Last Value 24 Hour Range   Temperature 98.6 °F (37 °C) (01/09/21 1446) Temp  Min: 97.9 °F (36.6 °C)  Max: 98.6 °F (37 °C)   Pulse (!) 114 (01/09/21 1446) Pulse  Min: 113  Max: 120   Respiratory 14 (01/09/21 1446) Resp  Min: 14  Max: 16   Non-Invasive  Blood Pressure 122/80 (01/09/21 1446) BP  Min: 112/75  Max: 131/86   Pulse Oximetry 98 % (01/09/21 1446) SpO2  Min: 97 %  Max: 100 %     Vital Today Admitted   Weight 87.5 kg (192 lb 14.4 oz) (01/09/21 0351) Weight: 85 kg (187 lb 6.3 oz) (01/08/21 0343)   Height N/A Height: 5' (152.4 cm) (01/08/21 0343)   BMI N/A BMI (Calculated): 36.6 (01/08/21 0343)       Physical  Exam:  General: Alert and oriented, no acute distress  Eyes: no scleral icterus, no conjunctival erythema   Cardio: S1, S2, RRR, no murmur  Pulm: decreased bs at bases  GI: Soft, +  bowel sounds.  Colostomy in right mid abdomen with small amount of fluid  : No suprapubic Tenderness, no CVA tenderness bilaterally  Ext: No upper or lower extremity edema noted. No cords palpated.   Musculoskeletal: 5/5 strength both upper and lower extremities. No joint tenderness or erythema.  Skin: No abnormal bruising or discoloration noted. No jaundice.   Psych: Appropriate mood and affect. Good Insight and Judgment  Neuro: No focal motor or sensory deficits noted. Pt appropriately follows commands.    Labs   Recent Labs     01/07/21  1709 01/08/21  0728 01/09/21  0628   WBC 12.1* 9.7 12.0*   RBC 3.36* 2.83* 2.94*   HGB 11.6* 9.8* 9.9*   HCT 35.3* 30.3* 30.8*    148 165   .1* 107.1* 104.8*   MCH 34.5* 34.6* 33.7   MCHC 32.9 32.3 32.1   NRBCRE 0 0 0   ASEG 10.6* 8.1*  --    ALYMP 0.7* 0.5*  --    AMONO 0.7 0.7  --    AEO 0.0 0.0  --    ABAS 0.0 0.0  --    PMOR Normal Normal  --          Recent Labs     01/07/21  1709 01/08/21  0728 01/09/21  0628   SODIUM 130* 136 136   POTASSIUM 4.8 4.6 4.2   MG  --  2.3 2.6*   PHOS  --   --  1.2*   CO2 27 23 26   ANIONGAP 11 13 9*   GLUCOSE 197* 201* 195*   BUN 14 15 18   CREATININE 0.84 0.82 0.76   BCRAT 17 18 24   CALCIUM 9.7 8.3* 8.7   BILIRUBIN 1.4*  --  0.7   *  --  51*   *  --  58   ALKPT 589*  --  439*   GLOB 5.7*  --  4.9*   AGR 0.4*  --  0.4*        No results found     No results for input(s): INR, PT, PTT in the last 72 hours.    Recent Labs   Lab 01/08/21  1720 01/08/21  2104 01/09/21  0003 01/09/21  0550 01/09/21  0715 01/09/21  1015 01/09/21  1126   GLUCOSE BEDSIDE 287* 249* 221* 189* 177* 164* 136*       Imaging  CT ABDOMEN PELVIS W CONTRAST - IV contrast only   Final Result      1. Free gas and fluid consistent with the presence of a perforated viscus.  Gas bubbles along the flank raises the possibility that the perforation is along the right: colon but a clear-cut origin is not detected.   2.  Known peritoneal carcinomatosis with mild dilatation of proximal small bowel possibly indicating early/incomplete obstruction related to the implants.  Findings were discussed by me with Dr. Sousa in the emergency room at 2152 hours this evening.   3.  Progression of hepatic metastases since 11/14/2020.  A cystic implant in the left lower abdomen is stable in the same interval.   5.  Atelectasis and/or pneumonitis in the lower lobes of both lungs.      Electronically Signed by: ROSI RAHMAN    Signed on: 1/7/2021 10:00 PM          US GALLBLADDER   Final Result    Hepatic metastases are better appreciated on CT than on this study.  There are likely some capsular implants along the liver.  Cholecystectomy.  Small pleural effusions.      Electronically Signed by: ROSI RAHMAN    Signed on: 1/7/2021 10:00 PM              Cultures  Microbiology Results     None             Assessment/Plan:  Active Hospital Problems    Diagnosis   • Pneumoperitoneum   • Peritonitis (CMS/HCC)   • Palliative care encounter   • Advanced care planning/counseling discussion   • Goals of care, counseling/discussion   • Ovarian cancer (CMS/HCC)      Assessment and plan  1.  Lower adominal pain:  Due to perforated viscous        Mild leukocytosis             - abdominal / pelvic ct result noted             - seen by surgery dr sahu               status post Exlap, enterolysis, diverting loop ileostomy, lavage (1/8/2021)             - npo, ivf, iv abx rocephin and flagyl             - ng tube and hassan dc by surgery today. Cont to monitor     2.  Acute postop pain             - on pca pump.  Wean as tolerate   - pain better post hassan and ng tube removal today     3.  Hx hypertension             - blood pressure borderline at admit             - hold metoprolol 100 mg qday and norvasc 5 mg qday  No since admit    - bp better today.  Restart low dose metoprolol 12.5 mg bid    4.  diabetes             -on insulin lantus 46 unit qhs and sliding scale             - given npo, will give lower dose lantus 10 u qhs plus insulin ss             - as bs trending up, remove d5 from ivf     bs better today     5.  anemia             - due acute blood loss from surgery             - hgb trends down. No sign of active bleed    will monitor     6.  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  Hypophos:  Supplement per protocol    DVT Prophylaxis:  SubQ Heparin  Diet: ------  Baseline Activity: Ambulates Independently      Code Status: Full Resuscitation    Physician Notification:  Consultants notified of patient via [employer].  Communication: with patient, nurse     MORE than 32 MINS WERE SPENT ON THIS PATIENTS CARE TODAY, more than 50% of which was spent coordinating patient care.     Primary Care Physician  MD Kecia Coleman MD [facility]  1/9/2021 4:27 PM

## 2021-07-22 NOTE — PHYSICAL THERAPY INITIAL EVALUATION ADULT - PRECAUTIONS/LIMITATIONS, REHAB EVAL
This is an 80 y/o Icelandic speaking female PMH right breast CA, S/P chemotherapy and right mastectomy 1989, lymphedema of the right upper extremity, colon CA, S/P chemo, radiation and colon resection, 2016, DVT of the lower extremities, which developed around the same time as the colon CA and colon resection, was likely immobile after surgery, no IVC filter placed, is on Eliquis, CAD, S/P CABG X 2 11/09/2020.  The patient has a H/O osteoarthritis, S/P right total knee replacement, c/o right hip pain at least for the last 4 years, getting progressively worse, she was previously scheduled for right total hip replacement in March; however, had an acute on chronic exacerbation of CHF and was not cleared to proceed.  The patient has been stable since treated for CHF in March.  Presents today for right total hip replacement./fall precautions/right hip precautions

## 2021-07-22 NOTE — OCCUPATIONAL THERAPY INITIAL EVALUATION ADULT - ADDITIONAL COMMENTS
(cont from above): The patient has a H/O osteoarthritis, S/P right total knee replacement, c/o right hip pain at least for the last 4 years, getting progressively worse, she was previously scheduled for right total hip replacement in March; however, had an acute on chronic exacerbation of CHF and was not cleared to proceed.  The patient has been stable since treated for CHF in March.  Presents right total hip replacement 7/22.

## 2021-07-22 NOTE — PRE-ANESTHESIA EVALUATION ADULT - NSANTHADDINFOFT_GEN_ALL_CORE
extensive rba dw pt at bedside, agrees with plan. neuraxial deferred in setting of eliquis + crcl < 50, taken 3.5d ago

## 2021-07-22 NOTE — PHYSICAL THERAPY INITIAL EVALUATION ADULT - LEVEL OF INDEPENDENCE: SCOOT/BRIDGE, REHAB EVAL
minimum assist (75% patients effort)
Scheduled for right mastoidectomy with ossicular chain reconstruction with fascia graft tympanoplasty mastoidectomy on 02/21/18. Pre op instructions, famotidine given and explained. Pt verbalized understanding.

## 2021-07-22 NOTE — PHYSICAL THERAPY INITIAL EVALUATION ADULT - ACTIVE RANGE OF MOTION EXAMINATION, REHAB EVAL
galina. upper extremity Active ROM was WNL (within normal limits)/LLE Active ROM was WNL (within normal limits)

## 2021-07-22 NOTE — PRE-ANESTHESIA EVALUATION ADULT - NSANTHPMHFT_GEN_ALL_CORE
chart + med/cv notes reviewed. cad s/p cabg last year - stable clinical status since, limited et but denies cp. repeat tte show recovered ef, mod-severe pah. ckd at/near baseline. dm a1c ~8, fs < 200. obese

## 2021-07-22 NOTE — OCCUPATIONAL THERAPY INITIAL EVALUATION ADULT - PERTINENT HX OF CURRENT PROBLEM, REHAB EVAL
80 y/o Mongolian speaking F PMH right breast CA, S/P chemotherapy and right mastectomy 1989, lymphedema of the right upper extremity, colon CA, S/P chemo, radiation and colon resection, 2016, DVT of the lower extremities, which developed around the same time as the colon CA and colon resection, was likely immobile after surgery, no IVC filter placed, is on Eliquis, CAD, S/P CABG X 2 11/09/2020.

## 2021-07-23 ENCOUNTER — TRANSCRIPTION ENCOUNTER (OUTPATIENT)
Age: 81
End: 2021-07-23

## 2021-07-23 LAB
ANION GAP SERPL CALC-SCNC: 16 MMOL/L — SIGNIFICANT CHANGE UP (ref 5–17)
BUN SERPL-MCNC: 28 MG/DL — HIGH (ref 7–23)
CALCIUM SERPL-MCNC: 9.5 MG/DL — SIGNIFICANT CHANGE UP (ref 8.4–10.5)
CHLORIDE SERPL-SCNC: 96 MMOL/L — SIGNIFICANT CHANGE UP (ref 96–108)
CO2 SERPL-SCNC: 23 MMOL/L — SIGNIFICANT CHANGE UP (ref 22–31)
COVID-19 SPIKE DOMAIN AB INTERP: NEGATIVE — SIGNIFICANT CHANGE UP
COVID-19 SPIKE DOMAIN ANTIBODY RESULT: 0.4 U/ML — SIGNIFICANT CHANGE UP
CREAT SERPL-MCNC: 1.74 MG/DL — HIGH (ref 0.5–1.3)
GLUCOSE BLDC GLUCOMTR-MCNC: 158 MG/DL — HIGH (ref 70–99)
GLUCOSE BLDC GLUCOMTR-MCNC: 181 MG/DL — HIGH (ref 70–99)
GLUCOSE BLDC GLUCOMTR-MCNC: 188 MG/DL — HIGH (ref 70–99)
GLUCOSE BLDC GLUCOMTR-MCNC: 194 MG/DL — HIGH (ref 70–99)
GLUCOSE SERPL-MCNC: 196 MG/DL — HIGH (ref 70–99)
HCT VFR BLD CALC: 36.6 % — SIGNIFICANT CHANGE UP (ref 34.5–45)
HGB BLD-MCNC: 11.2 G/DL — LOW (ref 11.5–15.5)
MCHC RBC-ENTMCNC: 27.9 PG — SIGNIFICANT CHANGE UP (ref 27–34)
MCHC RBC-ENTMCNC: 30.6 GM/DL — LOW (ref 32–36)
MCV RBC AUTO: 91 FL — SIGNIFICANT CHANGE UP (ref 80–100)
NRBC # BLD: 0 /100 WBCS — SIGNIFICANT CHANGE UP (ref 0–0)
PLATELET # BLD AUTO: 258 K/UL — SIGNIFICANT CHANGE UP (ref 150–400)
POTASSIUM SERPL-MCNC: 5.1 MMOL/L — SIGNIFICANT CHANGE UP (ref 3.5–5.3)
POTASSIUM SERPL-SCNC: 5.1 MMOL/L — SIGNIFICANT CHANGE UP (ref 3.5–5.3)
RBC # BLD: 4.02 M/UL — SIGNIFICANT CHANGE UP (ref 3.8–5.2)
RBC # FLD: 16.6 % — HIGH (ref 10.3–14.5)
SARS-COV-2 IGG+IGM SERPL QL IA: 0.4 U/ML — SIGNIFICANT CHANGE UP
SARS-COV-2 IGG+IGM SERPL QL IA: NEGATIVE — SIGNIFICANT CHANGE UP
SODIUM SERPL-SCNC: 135 MMOL/L — SIGNIFICANT CHANGE UP (ref 135–145)
WBC # BLD: 11.12 K/UL — HIGH (ref 3.8–10.5)
WBC # FLD AUTO: 11.12 K/UL — HIGH (ref 3.8–10.5)

## 2021-07-23 PROCEDURE — 99222 1ST HOSP IP/OBS MODERATE 55: CPT

## 2021-07-23 RX ORDER — TRAMADOL HYDROCHLORIDE 50 MG/1
1 TABLET ORAL
Qty: 0 | Refills: 0 | DISCHARGE
Start: 2021-07-23

## 2021-07-23 RX ORDER — OXYCODONE HYDROCHLORIDE 5 MG/1
1 TABLET ORAL
Qty: 0 | Refills: 0 | DISCHARGE
Start: 2021-07-23

## 2021-07-23 RX ORDER — ACETAMINOPHEN 500 MG
1000 TABLET ORAL ONCE
Refills: 0 | Status: DISCONTINUED | OUTPATIENT
Start: 2021-07-23 | End: 2021-07-24

## 2021-07-23 RX ORDER — ACETAMINOPHEN 500 MG
3 TABLET ORAL
Qty: 0 | Refills: 0 | DISCHARGE
Start: 2021-07-23

## 2021-07-23 RX ORDER — APIXABAN 2.5 MG/1
1 TABLET, FILM COATED ORAL
Qty: 0 | Refills: 0 | DISCHARGE
Start: 2021-07-23

## 2021-07-23 RX ADMIN — Medication 1000 MILLIGRAM(S): at 01:16

## 2021-07-23 RX ADMIN — Medication 1: at 09:07

## 2021-07-23 RX ADMIN — TRAMADOL HYDROCHLORIDE 50 MILLIGRAM(S): 50 TABLET ORAL at 17:51

## 2021-07-23 RX ADMIN — Medication 81 MILLIGRAM(S): at 11:31

## 2021-07-23 RX ADMIN — OXYCODONE HYDROCHLORIDE 5 MILLIGRAM(S): 5 TABLET ORAL at 22:17

## 2021-07-23 RX ADMIN — APIXABAN 2.5 MILLIGRAM(S): 2.5 TABLET, FILM COATED ORAL at 06:52

## 2021-07-23 RX ADMIN — Medication 975 MILLIGRAM(S): at 17:11

## 2021-07-23 RX ADMIN — Medication 400 MILLIGRAM(S): at 00:46

## 2021-07-23 RX ADMIN — TRAMADOL HYDROCHLORIDE 50 MILLIGRAM(S): 50 TABLET ORAL at 12:01

## 2021-07-23 RX ADMIN — TRAMADOL HYDROCHLORIDE 50 MILLIGRAM(S): 50 TABLET ORAL at 18:21

## 2021-07-23 RX ADMIN — PANTOPRAZOLE SODIUM 40 MILLIGRAM(S): 20 TABLET, DELAYED RELEASE ORAL at 06:53

## 2021-07-23 RX ADMIN — POLYETHYLENE GLYCOL 3350 17 GRAM(S): 17 POWDER, FOR SOLUTION ORAL at 22:17

## 2021-07-23 RX ADMIN — Medication 975 MILLIGRAM(S): at 17:14

## 2021-07-23 RX ADMIN — SODIUM CHLORIDE 500 MILLILITER(S): 9 INJECTION, SOLUTION INTRAVENOUS at 06:54

## 2021-07-23 RX ADMIN — SPIRONOLACTONE 12.5 MILLIGRAM(S): 25 TABLET, FILM COATED ORAL at 07:41

## 2021-07-23 RX ADMIN — TRAMADOL HYDROCHLORIDE 50 MILLIGRAM(S): 50 TABLET ORAL at 02:04

## 2021-07-23 RX ADMIN — SENNA PLUS 2 TABLET(S): 8.6 TABLET ORAL at 22:17

## 2021-07-23 RX ADMIN — BUMETANIDE 1 MILLIGRAM(S): 0.25 INJECTION INTRAMUSCULAR; INTRAVENOUS at 17:12

## 2021-07-23 RX ADMIN — GABAPENTIN 600 MILLIGRAM(S): 400 CAPSULE ORAL at 11:31

## 2021-07-23 RX ADMIN — Medication 1: at 11:52

## 2021-07-23 RX ADMIN — Medication 1: at 17:49

## 2021-07-23 RX ADMIN — TRAMADOL HYDROCHLORIDE 50 MILLIGRAM(S): 50 TABLET ORAL at 01:34

## 2021-07-23 RX ADMIN — APIXABAN 2.5 MILLIGRAM(S): 2.5 TABLET, FILM COATED ORAL at 17:12

## 2021-07-23 RX ADMIN — AMLODIPINE BESYLATE 5 MILLIGRAM(S): 2.5 TABLET ORAL at 06:53

## 2021-07-23 RX ADMIN — TRAMADOL HYDROCHLORIDE 50 MILLIGRAM(S): 50 TABLET ORAL at 11:31

## 2021-07-23 RX ADMIN — BUMETANIDE 1 MILLIGRAM(S): 0.25 INJECTION INTRAMUSCULAR; INTRAVENOUS at 07:39

## 2021-07-23 RX ADMIN — OXYCODONE HYDROCHLORIDE 5 MILLIGRAM(S): 5 TABLET ORAL at 22:47

## 2021-07-23 RX ADMIN — Medication 400 MILLIGRAM(S): at 08:32

## 2021-07-23 RX ADMIN — ATORVASTATIN CALCIUM 40 MILLIGRAM(S): 80 TABLET, FILM COATED ORAL at 22:17

## 2021-07-23 RX ADMIN — Medication 150 MILLIGRAM(S): at 06:53

## 2021-07-23 NOTE — DISCHARGE NOTE PROVIDER - NSDCQMCOGNITION_NEU_ALL_CORE
No difficulties No difficulties/Difficulty remembering Difficulty concentrating/Difficulty making decisions/Difficulty remembering

## 2021-07-23 NOTE — DISCHARGE NOTE PROVIDER - NSDCFUADDINST_GEN_ALL_CORE_FT
Keep surgical incision/Aquacel dressing clean and dry, continue weight bearing as tolerated ambulation with rolling walker and maintaining posterior total hip precautions. Follow up with Dr. Guerrero in his office post operative day #14 (8/5/2021) for dressing/suture/staple removal and wound check. Keep surgical incision/Aquacel dressing clean and dry, continue weight bearing as tolerated ambulation with rolling walker and maintaining posterior total hip precautions. Have dressing/sutures/staples removed and steri-strips applied post operative day #14 (8/5/2021) if applicable. Follow up with Dr. Guerrero in his office 3-4 weeks post op.

## 2021-07-23 NOTE — DISCHARGE NOTE PROVIDER - NSDCMRMEDTOKEN_GEN_ALL_CORE_FT
amLODIPine 5 mg oral tablet: 1 tab(s) orally once a day  apixaban 2.5 mg oral tablet: 1 tab(s) orally 2 times a day  aspirin 81 mg oral tablet, chewable: 1 tab(s) orally once a day  atorvastatin 40 mg oral tablet: 1 tab(s) orally once a day (at bedtime)  bumetanide 1 mg oral tablet: 1 tab(s) orally 2 times a day  Endocet 10/325 oral tablet: 1 tab(s) orally every 8 hours, As Needed - 6) MDD:20mg  famotidine 20 mg oral tablet: 1 tab(s) orally once a day (at bedtime)  glimepiride 2 mg oral tablet: 1 tab(s) orally once a day  Gralise 600 mg/24 hours oral tablet, extended release: 1 tab(s) orally once a day  Januvia 25 mg oral tablet: 1 tab(s) orally once a day  spironolactone 25 mg oral tablet: 0.5 tab(s) orally once a day  Toprol- mg oral tablet, extended release: 1.5 tab(s) orally once a day  Vitamin B-12: 1 tab(s) orally 3 times a week  Vitamin D3: 1 tab(s) orally once a day   amLODIPine 5 mg oral tablet: 1 tab(s) orally once a day  apixaban 2.5 mg oral tablet: 1 tab(s) orally 2 times a day  aspirin 81 mg oral tablet, chewable: 1 tab(s) orally once a day  atorvastatin 40 mg oral tablet: 1 tab(s) orally once a day (at bedtime)  bumetanide 1 mg oral tablet: 1 tab(s) orally 2 times a day  famotidine 20 mg oral tablet: 1 tab(s) orally once a day (at bedtime)  glimepiride 2 mg oral tablet: 1 tab(s) orally once a day  Gralise 600 mg/24 hours oral tablet, extended release: 1 tab(s) orally once a day  Januvia 25 mg oral tablet: 1 tab(s) orally once a day  oxycodone-acetaminophen 10 mg-300 mg oral tablet: 1 tab(s) orally every 6 hours as needed for severe pain  spironolactone 25 mg oral tablet: 0.5 tab(s) orally once a day  Toprol- mg oral tablet, extended release: 1.5 tab(s) orally once a day  Vitamin B-12: 1 tab(s) orally 3 times a week  Vitamin D3: 1 tab(s) orally once a day   acetaminophen 325 mg oral tablet: 2 tabs orally every 6 hours X 5 days, then as needed for mild pain  amLODIPine 5 mg oral tablet: 1 tab(s) orally once a day  apixaban 2.5 mg oral tablet: 1 tab(s) orally 2 times a day  aspirin 81 mg oral tablet, chewable: 1 tab(s) orally once a day  atorvastatin 40 mg oral tablet: 1 tab(s) orally once a day (at bedtime)  bumetanide 1 mg oral tablet: 1 tab(s) orally 2 times a day  famotidine 20 mg oral tablet: 1 tab(s) orally once a day (at bedtime)  glimepiride 2 mg oral tablet: 1 tab(s) orally once a day  Gralise 600 mg/24 hours oral tablet, extended release: 1 tab(s) orally once a day  Januvia 25 mg oral tablet: 1 tab(s) orally once a day  oxycodone-acetaminophen 10 mg-300 mg oral tablet: 1 tab(s) orally every 6 hours as needed for severe pain  spironolactone 25 mg oral tablet: 0.5 tab(s) orally once a day  Toprol- mg oral tablet, extended release: 1.5 tab(s) orally once a day  Vitamin B-12: 1 tab(s) orally 3 times a week  Vitamin D3: 1 tab(s) orally once a day   acetaminophen 325 mg oral tablet: 3 tab(s) orally every 8 hours, As Needed  amLODIPine 5 mg oral tablet: 1 tab(s) orally once a day  apixaban 2.5 mg oral tablet: 1 tab(s) orally 2 times a day  aspirin 81 mg oral tablet, chewable: 1 tab(s) orally once a day  atorvastatin 40 mg oral tablet: 1 tab(s) orally once a day (at bedtime)  bumetanide 1 mg oral tablet: 1 tab(s) orally 2 times a day  famotidine 20 mg oral tablet: 1 tab(s) orally once a day (at bedtime)  glimepiride 2 mg oral tablet: 1 tab(s) orally once a day  Gralise 600 mg/24 hours oral tablet, extended release: 1 tab(s) orally once a day  Januvia 25 mg oral tablet: 1 tab(s) orally once a day  oxyCODONE 5 mg oral tablet: 1 or 2 tab(s) orally every 4 hours prn mod-severe pain   pantoprazole 40 mg oral delayed release tablet: 1 tab(s) orally once a day (before a meal)  polyethylene glycol 3350 oral powder for reconstitution: 17 gram(s) orally once a day (at bedtime), As Needed  senna oral tablet: 2 tab(s) orally once a day (at bedtime)  spironolactone 25 mg oral tablet: 0.5 tab(s) orally once a day  sulfamethoxazole-trimethoprim 800 mg-160 mg oral tablet: 1 tab(s) orally 2 times a day  Toprol- mg oral tablet, extended release: 1.5 tab(s) orally once a day  traMADol 50 mg oral tablet: 1 tab(s) orally every 8 hours, As Needed for mild pain  Vitamin B-12: 1 tab(s) orally 3 times a week  Vitamin D3: 1 tab(s) orally once a day

## 2021-07-23 NOTE — CONSULT NOTE ADULT - SUBJECTIVE AND OBJECTIVE BOX
HPI:  This is an 82 y/o Palauan speaking female PMH right breast CA, S/P chemotherapy and right mastectomy 1989, lymphedema of the right upper extremity, colon CA, S/P chemo, radiation and colon resection, 2016, DVT of the lower extremities, which developed around the same time as the colon CA and colon resection, was likely immobile after surgery, no IVC filter placed, is on Eliquis, CAD, S/P CABG X 2 11/09/2020.  The patient has a H/O osteoarthritis, S/P right total knee replacement, c/o right hip pain at least for the last 4 years, getting progressively worse, she was previously scheduled for right total hip replacement in March; however, had an acute on chronic exacerbation of CHF and was not cleared to proceed.  The patient has been stable since treated for CHF in March.  Presents today for right total hip replacement. (12 Jul 2021 10:21)    Patient was admitted on 7/22, had MARLA, WBAT RLE, seen today, pain controlled, participating with therapy.     REVIEW OF SYSTEMS  Constitutional - No fever, No weight loss, No fatigue  HEENT - No eye pain, No visual disturbances, No difficulty hearing, No tinnitus, No vertigo, No neck pain  Respiratory - No cough, No wheezing, No shortness of breath  Cardiovascular - No chest pain, No palpitations  Gastrointestinal - No abdominal pain, No nausea, No vomiting, No diarrhea, No constipation  Genitourinary - No dysuria, No frequency, No hematuria, No incontinence  Neurological - No headaches, No memory loss, No loss of strength, No numbness, No tremors  Psychiatric - No depression, No anxiety    VITALS  T(C): 36.8 (07-23-21 @ 08:55), Max: 36.8 (07-22-21 @ 20:43)  HR: 83 (07-23-21 @ 10:18) (57 - 83)  BP: 124/- (07-23-21 @ 10:18) (107/53 - 149/67)  RR: 18 (07-23-21 @ 08:55) (12 - 22)  SpO2: 100% (07-23-21 @ 10:18) (94% - 100%)  Wt(kg): --    PAST MEDICAL & SURGICAL HISTORY  DM (diabetes mellitus)    HTN (hypertension)    Hyperlipidemia    Obesity (BMI 35.0-39.9 without comorbidity)    Breast CA, right    Colon cancer    Lymphedema of arm    Cancer    Rectal cancer    Breast CA, right    HTN (hypertension)    Diabetes mellitus    Lymphedema of arm    Hyperlipidemia    Rectal cancer    S/P chemotherapy, time since greater than 12 weeks    S/P radiation &gt; 12 weeks    Obese    Type II diabetes mellitus    CHF (congestive heart failure)    DVT of leg (deep venous thrombosis)    Elevated blood pressure reading in office with diagnosis of hypertension    Renal insufficiency    H/O mastectomy, right    History of tonsillectomy    S/P colectomy    History of appendectomy    S/P ileostomy    S/P colon resection    S/P TKR (total knee replacement), right        SOCIAL HISTORY  Smoking - Denied  EtOH - Denied   Drugs - Denied    FUNCTIONAL HISTORY  Lives alone in apt, 3 steps to enter  Independent ADLs, daughter assists with showering, used RW x years, has someone helping at home x 2-3 hours in mornings with cleaning    CURRENT FUNCTIONAL STATUS  7/23 PT  bed mobility min assist  transfers contact guard/supervision  gait supervision x 50 feet with RW    7/22 OT  bed mobility min assist  transfers min assist with RW    FAMILY HISTORY   Family history of diabetes mellitus in mother    Family history of diabetes mellitus in son    Family history of heart attack (Child)        RECENT LABS/IMAGING  CBC Full  -  ( 23 Jul 2021 06:35 )  WBC Count : 11.12 K/uL  RBC Count : 4.02 M/uL  Hemoglobin : 11.2 g/dL  Hematocrit : 36.6 %  Platelet Count - Automated : 258 K/uL  Mean Cell Volume : 91.0 fl  Mean Cell Hemoglobin : 27.9 pg  Mean Cell Hemoglobin Concentration : 30.6 gm/dL  Auto Neutrophil # : x  Auto Lymphocyte # : x  Auto Monocyte # : x  Auto Eosinophil # : x  Auto Basophil # : x  Auto Neutrophil % : x  Auto Lymphocyte % : x  Auto Monocyte % : x  Auto Eosinophil % : x  Auto Basophil % : x    07-23    135  |  96  |  28<H>  ----------------------------<  196<H>  5.1   |  23  |  1.74<H>    Ca    9.5      23 Jul 2021 06:35          ALLERGIES  CT scan dye (Hives)  penicillin (Unknown)      MEDICATIONS   acetaminophen   Tablet .. 975 milliGRAM(s) Oral every 8 hours  aluminum hydroxide/magnesium hydroxide/simethicone Suspension 30 milliLiter(s) Oral four times a day PRN  amLODIPine   Tablet 5 milliGRAM(s) Oral daily  apixaban 2.5 milliGRAM(s) Oral two times a day  aspirin  chewable 81 milliGRAM(s) Oral daily  atorvastatin 40 milliGRAM(s) Oral at bedtime  benzocaine 15 mG/menthol 3.6 mG (Sugar-Free) Lozenge 1 Lozenge Oral two times a day PRN  buMETAnide 1 milliGRAM(s) Oral two times a day  chlorhexidine 2% Cloths 1 Application(s) Topical once  dextrose 40% Gel 15 Gram(s) Oral once  dextrose 5%. 1000 milliLiter(s) IV Continuous <Continuous>  dextrose 5%. 1000 milliLiter(s) IV Continuous <Continuous>  dextrose 50% Injectable 25 Gram(s) IV Push once  dextrose 50% Injectable 12.5 Gram(s) IV Push once  dextrose 50% Injectable 25 Gram(s) IV Push once  gabapentin 600 milliGRAM(s) Oral daily  glucagon  Injectable 1 milliGRAM(s) IntraMuscular once  insulin lispro (ADMELOG) corrective regimen sliding scale   SubCutaneous three times a day before meals  insulin lispro (ADMELOG) corrective regimen sliding scale   SubCutaneous at bedtime  lactated ringers. 1000 milliLiter(s) IV Continuous <Continuous>  lidocaine 1% Injectable 0.2 milliLiter(s) Local Injection once  magnesium hydroxide Suspension 30 milliLiter(s) Oral daily PRN  metoprolol succinate  milliGRAM(s) Oral daily  ondansetron Injectable 4 milliGRAM(s) IV Push every 6 hours PRN  oxyCODONE    IR 5 milliGRAM(s) Oral every 4 hours PRN  oxyCODONE    IR 10 milliGRAM(s) Oral every 4 hours PRN  pantoprazole    Tablet 40 milliGRAM(s) Oral before breakfast  polyethylene glycol 3350 17 Gram(s) Oral at bedtime  senna 2 Tablet(s) Oral at bedtime  spironolactone 12.5 milliGRAM(s) Oral daily  traMADol 50 milliGRAM(s) Oral every 6 hours PRN      ----------------------------------------------------------------------------------------  PHYSICAL EXAM  Constitutional - NAD, Comfortable, in chair   Chest - Breathing comfortably  Cardiovascular - S1S2   Abdomen - Soft   Extremities - No C/C/E, No calf tenderness   Neurologic Exam -                    Cognitive - Awake, Alert, AAO to self, place, date, year, situation     Communication - Fluent, No dysarthria        Motor -                     LEFT    UE - ShAB 5/5, EF 5/5, EE 5/5, WE 5/5,  5/5                    RIGHT UE - ShAB 5/5, EF 5/5, EE 5/5, WE 5/5,  5/5                    LEFT    LE - HF 5/5, KE 5/5, DF 5/5, PF 5/5                    RIGHT LE - DF 5/5, PF 5/5        Sensory - Intact to LT     Psychiatric - Mood stable, Affect WNL  ----------------------------------------------------------------------------------------  ASSESSMENT/PLAN  81yFemale with functional deficits after right MARLA  WBAT RLE, posterior dislocation precautions, abd pillow  Pain - Tylenol, oxycodone, tramadol, gabapentin   DVT PPX - SCDs, eliquis   Rehab - Will continue to follow for ongoing rehab needs and recommendations.   patient is making progress with bedside therapy, now at supervision with transfers and ambulation using RW    Expect patient to achieve functional goals for DC HOME with HOME CARE, assist from family

## 2021-07-23 NOTE — DISCHARGE NOTE NURSING/CASE MANAGEMENT/SOCIAL WORK - PATIENT PORTAL LINK FT
You can access the FollowMyHealth Patient Portal offered by [facility] by registering at the following website: [email or web address]. By joining [facility]’s FollowMyHealth portal, you will also be able to view your health information using other applications (apps) compatible with our system.

## 2021-07-23 NOTE — DISCHARGE NOTE PROVIDER - HOSPITAL COURSE
Reason for Admission  As per daughter Shauna, is having a right hip replacement.  Patient goal after surgery: to be able to be self sufficient and to be able to walk.     History of Present Illness:  History of Present Illness    This is an 82 y/o Polish speaking female PMH right breast CA, S/P chemotherapy and right mastectomy 1989, lymphedema of the right upper extremity, colon CA, S/P chemo, radiation and colon resection, 2016, DVT of the lower extremities, which developed around the same time as the colon CA and colon resection, was likely immobile after surgery, no IVC filter placed, is on Eliquis, CAD, S/P CABG X 2 11/09/2020.  The patient has a H/O osteoarthritis, S/P right total knee replacement, c/o right hip pain at least for the last 4 years, getting progressively worse, she was previously scheduled for right total hip replacement in March; however, had an acute on chronic exacerbation of CHF and was not cleared to proceed.  The patient has been stable since treated for CHF in March.  Presents today for right total hip replacement.     Past Medical, Past Surgical History:  PAST MEDICAL HISTORY:  Breast CA, right 1989 s/p mastectomy ,IV Chemotherapy  CHF (congestive heart failure)   DVT of leg (deep venous thrombosis) right calf DVT  2/2019 pt on Eliquis  HTN (hypertension)   Hyperlipidemia   Lymphedema of arm right arm s/p mastectomy  Obese   Rectal cancer s/p chemotherapy and  radiation 12 weeks 2/2015 -3/2015  Renal insufficiency   Type II diabetes mellitus.     PAST SURGICAL HISTORY:  History of appendectomy 1953  S/P colon resection 2015  S/P ileostomy low anterior resection-8/10/15, reversal of ileostomy 2016  S/P mastectomy, right 1989  S/P TKR (total knee replacement), right 2017.     HOSPITAL COURSE:  82 y/o FM underwent right total hip arthroplasty on 7/22/2021 with Dr. Guerrero.  Patient tolerated procedure well.  Patient was evaluated postoperatively by physical and occupational therapists for weight bearing as tolerated ambulation with posterior total hip precautions and cleared patient for discharge home.  Patient advised to keep surgical incision/dressing clean and dry, and  follow up with Dr. Guerrero in his office post operative day #14 (8/5/2021).       Reason for Admission  As per daughter Shauna, is having a right hip replacement.  Patient goal after surgery: to be able to be self sufficient and to be able to walk.     History of Present Illness:  History of Present Illness    This is an 80 y/o Wol[place] speaking female PMH right breast CA, S/P chemotherapy and right mastectomy 1989, lymphedema of the right upper extremity, colon CA, S/P chemo, radiation and colon resection, 2016, DVT of the lower extremities, which developed around the same time as the colon CA and colon resection, was likely immobile after surgery, no IVC filter placed, is on Eliquis, CAD, S/P CABG X 2 11/09/2020.  The patient has a H/O osteoarthritis, S/P right total knee replacement, c/o right hip pain at least for the last 4 years, getting progressively worse, she was previously scheduled for right total hip replacement in March; however, had an acute on chronic exacerbation of CHF and was not cleared to proceed.  The patient has been stable since treated for CHF in March.  Presents today for right total hip replacement.     Past Medical, Past Surgical History:  PAST MEDICAL HISTORY:  Breast CA, right 1989 s/p mastectomy ,IV Chemotherapy  CHF (congestive heart failure)   DVT of leg (deep venous thrombosis) right calf DVT  2/2019 pt on Eliquis  HTN (hypertension)   Hyperlipidemia   Lymphedema of arm right arm s/p mastectomy  Obese   Rectal cancer s/p chemotherapy and  radiation 12 weeks 2/2015 -3/2015  Renal insufficiency   Type II diabetes mellitus.     PAST SURGICAL HISTORY:  History of appendectomy 1953  S/P colon resection 2015  S/P ileostomy low anterior resection-8/10/15, reversal of ileostomy 2016  S/P mastectomy, right 1989  S/P TKR (total knee replacement), right 2017.     HOSPITAL COURSE:  80 y/o FM underwent right total hip arthroplasty on 7/22/2021 with Dr. Guerrero.  Patient tolerated procedure well.  Patient was evaluated postoperatively by physical and occupational therapists for weight bearing as tolerated ambulation with posterior total hip precautions and cleared patient for discharge home.  Patient advised to keep surgical incision/dressing clean and dry, and  follow up with Dr. Guerrero in his office post operative day #14 (8/5/2021).       Reason for Admission  As per daughter Shauna, is having a right hip replacement.  Patient goal after surgery: to be able to be self sufficient and to be able to walk.     History of Present Illness:  History of Present Illness    This is an 82 y/o Yakut speaking female PMH right breast CA, S/P chemotherapy and right mastectomy 1989, lymphedema of the right upper extremity, colon CA, S/P chemo, radiation and colon resection, 2016, DVT of the lower extremities, which developed around the same time as the colon CA and colon resection, was likely immobile after surgery, no IVC filter placed, is on Eliquis, CAD, S/P CABG X 2 11/09/2020.  The patient has a H/O osteoarthritis, S/P right total knee replacement, c/o right hip pain at least for the last 4 years, getting progressively worse, she was previously scheduled for right total hip replacement in March; however, had an acute on chronic exacerbation of CHF and was not cleared to proceed.  The patient has been stable since treated for CHF in March.  Presents today for right total hip replacement.     Past Medical, Past Surgical History:  PAST MEDICAL HISTORY:  Breast CA, right 1989 s/p mastectomy ,IV Chemotherapy  CHF (congestive heart failure)   DVT of leg (deep venous thrombosis) right calf DVT  2/2019 pt on Eliquis  HTN (hypertension)   Hyperlipidemia   Lymphedema of arm right arm s/p mastectomy  Obese   Rectal cancer s/p chemotherapy and  radiation 12 weeks 2/2015 -3/2015  Renal insufficiency   Type II diabetes mellitus.     PAST SURGICAL HISTORY:  History of appendectomy 1953  S/P colon resection 2015  S/P ileostomy low anterior resection-8/10/15, reversal of ileostomy 2016  S/P mastectomy, right 1989  S/P TKR (total knee replacement), right 2017.     HOSPITAL COURSE:  82 y/o FM underwent right total hip arthroplasty on 7/22/2021 with Dr. Guerrero.  Patient tolerated procedure well.  Patient was evaluated postoperatively by physical and occupational therapists for weight bearing as tolerated ambulation with posterior total hip precautions and cleared patient for discharge home, however patient and patients family wish to send patient to rehab facility.  Patient advised to keep surgical incision/dressing clean and dry, and  have dressing/sutures/staples removed and steri-strips applied post operative day #14(8/5/2021)if applicable. Patient should follow up with  Dr. Guerrero in his office 3-4 weeks post op.

## 2021-07-23 NOTE — DISCHARGE NOTE PROVIDER - CARE PROVIDER_API CALL
Sanford Guerrero (MD)  Orthopaedic Surgery  [address]  [place] [address]  Phone: [phone]  Fax: [phone]  Follow Up Time:

## 2021-07-23 NOTE — DISCHARGE NOTE NURSING/CASE MANAGEMENT/SOCIAL WORK - NSDCVIVACCINE_GEN_ALL_CORE_FT
No Vaccines Administered. COVID-19, mRNA, LNP-S, PF, 30 mcg/0.3 mL dose (Pfizer); 27-Jul-2021 15:42; Darien Garcia); Pf[employer] Inc; [id number] (Exp. Date: 31-Aug-2021); IntraMuscular; Deltoid Left.; 0.3 milliLiter(s);

## 2021-07-23 NOTE — DISCHARGE NOTE NURSING/CASE MANAGEMENT/SOCIAL WORK - NSSCNAMETXT_GEN_ALL_CORE
[facility] at home start of care day after discharge [facility] [place]  start of care day after discharge

## 2021-07-24 LAB
GLUCOSE BLDC GLUCOMTR-MCNC: 178 MG/DL — HIGH (ref 70–99)
GLUCOSE BLDC GLUCOMTR-MCNC: 193 MG/DL — HIGH (ref 70–99)
GLUCOSE BLDC GLUCOMTR-MCNC: 209 MG/DL — HIGH (ref 70–99)
GLUCOSE BLDC GLUCOMTR-MCNC: 215 MG/DL — HIGH (ref 70–99)
GLUCOSE BLDC GLUCOMTR-MCNC: 226 MG/DL — HIGH (ref 70–99)
GLUCOSE BLDC GLUCOMTR-MCNC: 242 MG/DL — HIGH (ref 70–99)

## 2021-07-24 RX ORDER — OXYCODONE HYDROCHLORIDE 5 MG/1
2.5 TABLET ORAL EVERY 4 HOURS
Refills: 0 | Status: DISCONTINUED | OUTPATIENT
Start: 2021-07-24 | End: 2021-07-25

## 2021-07-24 RX ORDER — ACETAMINOPHEN 500 MG
650 TABLET ORAL EVERY 6 HOURS
Refills: 0 | Status: DISCONTINUED | OUTPATIENT
Start: 2021-07-24 | End: 2021-07-25

## 2021-07-24 RX ORDER — TRAMADOL HYDROCHLORIDE 50 MG/1
50 TABLET ORAL EVERY 6 HOURS
Refills: 0 | Status: DISCONTINUED | OUTPATIENT
Start: 2021-07-24 | End: 2021-07-25

## 2021-07-24 RX ORDER — ACETAMINOPHEN 500 MG
1000 TABLET ORAL ONCE
Refills: 0 | Status: COMPLETED | OUTPATIENT
Start: 2021-07-24 | End: 2021-07-24

## 2021-07-24 RX ORDER — TIZANIDINE 4 MG/1
2 TABLET ORAL EVERY 6 HOURS
Refills: 0 | Status: DISCONTINUED | OUTPATIENT
Start: 2021-07-24 | End: 2021-07-26

## 2021-07-24 RX ORDER — ACETAMINOPHEN 500 MG
2 TABLET ORAL
Qty: 0 | Refills: 0 | DISCHARGE
Start: 2021-07-24

## 2021-07-24 RX ADMIN — TRAMADOL HYDROCHLORIDE 50 MILLIGRAM(S): 50 TABLET ORAL at 05:29

## 2021-07-24 RX ADMIN — SPIRONOLACTONE 12.5 MILLIGRAM(S): 25 TABLET, FILM COATED ORAL at 05:28

## 2021-07-24 RX ADMIN — GABAPENTIN 600 MILLIGRAM(S): 400 CAPSULE ORAL at 13:05

## 2021-07-24 RX ADMIN — Medication 2: at 14:49

## 2021-07-24 RX ADMIN — Medication 650 MILLIGRAM(S): at 19:10

## 2021-07-24 RX ADMIN — APIXABAN 2.5 MILLIGRAM(S): 2.5 TABLET, FILM COATED ORAL at 05:28

## 2021-07-24 RX ADMIN — Medication 2: at 19:58

## 2021-07-24 RX ADMIN — Medication 1000 MILLIGRAM(S): at 10:00

## 2021-07-24 RX ADMIN — Medication 650 MILLIGRAM(S): at 13:40

## 2021-07-24 RX ADMIN — Medication 81 MILLIGRAM(S): at 13:05

## 2021-07-24 RX ADMIN — APIXABAN 2.5 MILLIGRAM(S): 2.5 TABLET, FILM COATED ORAL at 19:11

## 2021-07-24 RX ADMIN — AMLODIPINE BESYLATE 5 MILLIGRAM(S): 2.5 TABLET ORAL at 05:28

## 2021-07-24 RX ADMIN — SENNA PLUS 2 TABLET(S): 8.6 TABLET ORAL at 22:11

## 2021-07-24 RX ADMIN — POLYETHYLENE GLYCOL 3350 17 GRAM(S): 17 POWDER, FOR SOLUTION ORAL at 22:12

## 2021-07-24 RX ADMIN — Medication 650 MILLIGRAM(S): at 23:29

## 2021-07-24 RX ADMIN — ATORVASTATIN CALCIUM 40 MILLIGRAM(S): 80 TABLET, FILM COATED ORAL at 22:12

## 2021-07-24 RX ADMIN — BUMETANIDE 1 MILLIGRAM(S): 0.25 INJECTION INTRAMUSCULAR; INTRAVENOUS at 05:28

## 2021-07-24 RX ADMIN — PANTOPRAZOLE SODIUM 40 MILLIGRAM(S): 20 TABLET, DELAYED RELEASE ORAL at 05:28

## 2021-07-24 RX ADMIN — Medication 150 MILLIGRAM(S): at 05:28

## 2021-07-24 RX ADMIN — TIZANIDINE 2 MILLIGRAM(S): 4 TABLET ORAL at 14:30

## 2021-07-24 RX ADMIN — Medication 650 MILLIGRAM(S): at 13:05

## 2021-07-24 RX ADMIN — TRAMADOL HYDROCHLORIDE 50 MILLIGRAM(S): 50 TABLET ORAL at 06:00

## 2021-07-24 RX ADMIN — Medication 400 MILLIGRAM(S): at 09:25

## 2021-07-24 RX ADMIN — OXYCODONE HYDROCHLORIDE 2.5 MILLIGRAM(S): 5 TABLET ORAL at 12:10

## 2021-07-24 RX ADMIN — BUMETANIDE 1 MILLIGRAM(S): 0.25 INJECTION INTRAMUSCULAR; INTRAVENOUS at 19:11

## 2021-07-24 RX ADMIN — Medication 2: at 11:32

## 2021-07-24 RX ADMIN — OXYCODONE HYDROCHLORIDE 2.5 MILLIGRAM(S): 5 TABLET ORAL at 11:33

## 2021-07-24 NOTE — CHART NOTE - NSCHARTNOTEFT_GEN_A_CORE
Ortho PA Note      Patients daughter at bedside reports concern mother lives alone. States she will only be able to check on her  for an "hour or two" when patient goes home. Reports will be more confident patient goes home after goes to   rehab and/or pain is better controlled.   informed.        MÓNICA Berkowitz  Orthopedic Surgery  [id number]

## 2021-07-25 LAB
GLUCOSE BLDC GLUCOMTR-MCNC: 150 MG/DL — HIGH (ref 70–99)
GLUCOSE BLDC GLUCOMTR-MCNC: 183 MG/DL — HIGH (ref 70–99)
GLUCOSE BLDC GLUCOMTR-MCNC: 222 MG/DL — HIGH (ref 70–99)
GLUCOSE BLDC GLUCOMTR-MCNC: 242 MG/DL — HIGH (ref 70–99)

## 2021-07-25 PROCEDURE — 93010 ELECTROCARDIOGRAM REPORT: CPT

## 2021-07-25 PROCEDURE — 71100 X-RAY EXAM RIBS UNI 2 VIEWS: CPT | Mod: 26

## 2021-07-25 RX ORDER — TRAMADOL HYDROCHLORIDE 50 MG/1
50 TABLET ORAL EVERY 6 HOURS
Refills: 0 | Status: DISCONTINUED | OUTPATIENT
Start: 2021-07-25 | End: 2021-07-26

## 2021-07-25 RX ORDER — ACETAMINOPHEN 500 MG
975 TABLET ORAL EVERY 6 HOURS
Refills: 0 | Status: DISCONTINUED | OUTPATIENT
Start: 2021-07-25 | End: 2021-07-28

## 2021-07-25 RX ADMIN — ATORVASTATIN CALCIUM 40 MILLIGRAM(S): 80 TABLET, FILM COATED ORAL at 21:21

## 2021-07-25 RX ADMIN — TRAMADOL HYDROCHLORIDE 50 MILLIGRAM(S): 50 TABLET ORAL at 04:52

## 2021-07-25 RX ADMIN — PANTOPRAZOLE SODIUM 40 MILLIGRAM(S): 20 TABLET, DELAYED RELEASE ORAL at 05:02

## 2021-07-25 RX ADMIN — BUMETANIDE 1 MILLIGRAM(S): 0.25 INJECTION INTRAMUSCULAR; INTRAVENOUS at 17:22

## 2021-07-25 RX ADMIN — OXYCODONE HYDROCHLORIDE 2.5 MILLIGRAM(S): 5 TABLET ORAL at 12:44

## 2021-07-25 RX ADMIN — Medication 650 MILLIGRAM(S): at 11:12

## 2021-07-25 RX ADMIN — Medication 975 MILLIGRAM(S): at 17:25

## 2021-07-25 RX ADMIN — Medication 975 MILLIGRAM(S): at 17:22

## 2021-07-25 RX ADMIN — TIZANIDINE 2 MILLIGRAM(S): 4 TABLET ORAL at 11:12

## 2021-07-25 RX ADMIN — Medication 650 MILLIGRAM(S): at 00:00

## 2021-07-25 RX ADMIN — Medication 1: at 07:50

## 2021-07-25 RX ADMIN — Medication 81 MILLIGRAM(S): at 11:12

## 2021-07-25 RX ADMIN — TRAMADOL HYDROCHLORIDE 50 MILLIGRAM(S): 50 TABLET ORAL at 05:30

## 2021-07-25 RX ADMIN — APIXABAN 2.5 MILLIGRAM(S): 2.5 TABLET, FILM COATED ORAL at 17:22

## 2021-07-25 RX ADMIN — AMLODIPINE BESYLATE 5 MILLIGRAM(S): 2.5 TABLET ORAL at 05:01

## 2021-07-25 RX ADMIN — SENNA PLUS 2 TABLET(S): 8.6 TABLET ORAL at 21:21

## 2021-07-25 RX ADMIN — TRAMADOL HYDROCHLORIDE 50 MILLIGRAM(S): 50 TABLET ORAL at 17:25

## 2021-07-25 RX ADMIN — Medication 150 MILLIGRAM(S): at 05:01

## 2021-07-25 RX ADMIN — BUMETANIDE 1 MILLIGRAM(S): 0.25 INJECTION INTRAMUSCULAR; INTRAVENOUS at 05:03

## 2021-07-25 RX ADMIN — TRAMADOL HYDROCHLORIDE 50 MILLIGRAM(S): 50 TABLET ORAL at 17:22

## 2021-07-25 RX ADMIN — APIXABAN 2.5 MILLIGRAM(S): 2.5 TABLET, FILM COATED ORAL at 05:01

## 2021-07-25 RX ADMIN — TIZANIDINE 2 MILLIGRAM(S): 4 TABLET ORAL at 23:05

## 2021-07-25 RX ADMIN — GABAPENTIN 600 MILLIGRAM(S): 400 CAPSULE ORAL at 12:40

## 2021-07-25 RX ADMIN — Medication 2: at 12:40

## 2021-07-25 RX ADMIN — Medication 650 MILLIGRAM(S): at 11:14

## 2021-07-25 RX ADMIN — Medication 975 MILLIGRAM(S): at 23:05

## 2021-07-25 RX ADMIN — POLYETHYLENE GLYCOL 3350 17 GRAM(S): 17 POWDER, FOR SOLUTION ORAL at 21:21

## 2021-07-25 RX ADMIN — Medication 650 MILLIGRAM(S): at 05:21

## 2021-07-25 RX ADMIN — OXYCODONE HYDROCHLORIDE 2.5 MILLIGRAM(S): 5 TABLET ORAL at 13:15

## 2021-07-25 RX ADMIN — SPIRONOLACTONE 12.5 MILLIGRAM(S): 25 TABLET, FILM COATED ORAL at 05:03

## 2021-07-26 LAB
GLUCOSE BLDC GLUCOMTR-MCNC: 193 MG/DL — HIGH (ref 70–99)
GLUCOSE BLDC GLUCOMTR-MCNC: 225 MG/DL — HIGH (ref 70–99)
GLUCOSE BLDC GLUCOMTR-MCNC: 231 MG/DL — HIGH (ref 70–99)
GLUCOSE BLDC GLUCOMTR-MCNC: 303 MG/DL — HIGH (ref 70–99)

## 2021-07-26 RX ORDER — GABAPENTIN 400 MG/1
300 CAPSULE ORAL
Refills: 0 | Status: DISCONTINUED | OUTPATIENT
Start: 2021-07-26 | End: 2021-07-28

## 2021-07-26 RX ADMIN — PANTOPRAZOLE SODIUM 40 MILLIGRAM(S): 20 TABLET, DELAYED RELEASE ORAL at 06:29

## 2021-07-26 RX ADMIN — Medication 2: at 17:50

## 2021-07-26 RX ADMIN — TRAMADOL HYDROCHLORIDE 50 MILLIGRAM(S): 50 TABLET ORAL at 01:20

## 2021-07-26 RX ADMIN — Medication 975 MILLIGRAM(S): at 17:51

## 2021-07-26 RX ADMIN — Medication 975 MILLIGRAM(S): at 12:06

## 2021-07-26 RX ADMIN — Medication 975 MILLIGRAM(S): at 18:21

## 2021-07-26 RX ADMIN — AMLODIPINE BESYLATE 5 MILLIGRAM(S): 2.5 TABLET ORAL at 06:29

## 2021-07-26 RX ADMIN — APIXABAN 2.5 MILLIGRAM(S): 2.5 TABLET, FILM COATED ORAL at 06:29

## 2021-07-26 RX ADMIN — GABAPENTIN 300 MILLIGRAM(S): 400 CAPSULE ORAL at 17:54

## 2021-07-26 RX ADMIN — Medication 81 MILLIGRAM(S): at 12:06

## 2021-07-26 RX ADMIN — SENNA PLUS 2 TABLET(S): 8.6 TABLET ORAL at 20:54

## 2021-07-26 RX ADMIN — SPIRONOLACTONE 12.5 MILLIGRAM(S): 25 TABLET, FILM COATED ORAL at 06:26

## 2021-07-26 RX ADMIN — Medication 975 MILLIGRAM(S): at 12:36

## 2021-07-26 RX ADMIN — APIXABAN 2.5 MILLIGRAM(S): 2.5 TABLET, FILM COATED ORAL at 17:50

## 2021-07-26 RX ADMIN — Medication 4: at 12:05

## 2021-07-26 RX ADMIN — BUMETANIDE 1 MILLIGRAM(S): 0.25 INJECTION INTRAMUSCULAR; INTRAVENOUS at 17:52

## 2021-07-26 RX ADMIN — Medication 975 MILLIGRAM(S): at 06:29

## 2021-07-26 RX ADMIN — Medication 150 MILLIGRAM(S): at 12:06

## 2021-07-26 RX ADMIN — Medication 2: at 08:46

## 2021-07-26 RX ADMIN — POLYETHYLENE GLYCOL 3350 17 GRAM(S): 17 POWDER, FOR SOLUTION ORAL at 20:55

## 2021-07-26 RX ADMIN — BUMETANIDE 1 MILLIGRAM(S): 0.25 INJECTION INTRAMUSCULAR; INTRAVENOUS at 06:29

## 2021-07-26 RX ADMIN — TRAMADOL HYDROCHLORIDE 50 MILLIGRAM(S): 50 TABLET ORAL at 07:51

## 2021-07-26 RX ADMIN — TRAMADOL HYDROCHLORIDE 50 MILLIGRAM(S): 50 TABLET ORAL at 00:48

## 2021-07-26 RX ADMIN — ATORVASTATIN CALCIUM 40 MILLIGRAM(S): 80 TABLET, FILM COATED ORAL at 20:55

## 2021-07-26 NOTE — PROVIDER CONTACT NOTE (OTHER) - ACTION/TREATMENT ORDERED:
MÓNICA Barrett made aware of above. Pt stating she will stay still and not cross legs. Pt on bed alarm with call bell in reach, safety maintained.
RN gave pt her am bp meds including lopressor 150mg and pain medication. MD stated to let him know in 30 minutes if HR is still elevated.
MÓNICA Carlson. no interventions needed at this time. Will continue to monitor.

## 2021-07-26 NOTE — PROVIDER CONTACT NOTE (OTHER) - SITUATION
c/o L flank pain
Pt refusing abduction pillow, refusing care, pulled out IV, refusing VS, combative with staff
pt tachycardic
Hypotension

## 2021-07-26 NOTE — PROVIDER CONTACT NOTE (OTHER) - REASON
elevated HR
Hypotension
c/o L flank pain
Pt refusing abduction pillow, refusing care, pulled out IV, refusing VS, combative with staff

## 2021-07-26 NOTE — PROVIDER CONTACT NOTE (OTHER) - ASSESSMENT
BP noted 96/51 durin PT session. As per MD Retaken 101/65, HR 85
Pt refusing abduction pillow, refusing care, pulled out IV, refusing VS, combative with staff. Pt throwing abduction pillow across room. Pt Scottish speaking,  phone used to communicate with patient - (@[phone]  Aliya #[id number]). As per  pt interrupting and not allowing for proper translation of RN information. Pt yelling "LEAVE ME ALONE, GO AWAY, MIND YOUR BUSINESS!" Pt pulled out IV, attempting to hit staff when staff trying to take vital signs. As per  pt refusing care, refusing IV, refusing to turn.
pt. c/o new L flank pain. no bruising noted. VSS. no swelling or edema noted. pt. denies fall. pt. denies trauma to that area.

## 2021-07-27 LAB
GLUCOSE BLDC GLUCOMTR-MCNC: 206 MG/DL — HIGH (ref 70–99)
GLUCOSE BLDC GLUCOMTR-MCNC: 244 MG/DL — HIGH (ref 70–99)
GLUCOSE BLDC GLUCOMTR-MCNC: 247 MG/DL — HIGH (ref 70–99)
GLUCOSE BLDC GLUCOMTR-MCNC: 292 MG/DL — HIGH (ref 70–99)
SARS-COV-2 RNA SPEC QL NAA+PROBE: SIGNIFICANT CHANGE UP

## 2021-07-27 RX ORDER — BNT162B2 0.23 MG/2.25ML
0.3 INJECTION, SUSPENSION INTRAMUSCULAR ONCE
Refills: 0 | Status: COMPLETED | OUTPATIENT
Start: 2021-07-27 | End: 2021-07-27

## 2021-07-27 RX ORDER — ACETAMINOPHEN 500 MG
1000 TABLET ORAL ONCE
Refills: 0 | Status: COMPLETED | OUTPATIENT
Start: 2021-07-27 | End: 2021-07-27

## 2021-07-27 RX ORDER — SODIUM CHLORIDE 9 MG/ML
3 INJECTION INTRAMUSCULAR; INTRAVENOUS; SUBCUTANEOUS EVERY 8 HOURS
Refills: 0 | Status: DISCONTINUED | OUTPATIENT
Start: 2021-07-27 | End: 2021-07-28

## 2021-07-27 RX ORDER — TRAMADOL HYDROCHLORIDE 50 MG/1
50 TABLET ORAL EVERY 8 HOURS
Refills: 0 | Status: DISCONTINUED | OUTPATIENT
Start: 2021-07-27 | End: 2021-07-28

## 2021-07-27 RX ADMIN — Medication 975 MILLIGRAM(S): at 05:56

## 2021-07-27 RX ADMIN — Medication 2: at 18:29

## 2021-07-27 RX ADMIN — AMLODIPINE BESYLATE 5 MILLIGRAM(S): 2.5 TABLET ORAL at 05:55

## 2021-07-27 RX ADMIN — TRAMADOL HYDROCHLORIDE 50 MILLIGRAM(S): 50 TABLET ORAL at 09:33

## 2021-07-27 RX ADMIN — GABAPENTIN 300 MILLIGRAM(S): 400 CAPSULE ORAL at 18:29

## 2021-07-27 RX ADMIN — Medication 1000 MILLIGRAM(S): at 13:26

## 2021-07-27 RX ADMIN — Medication 975 MILLIGRAM(S): at 23:38

## 2021-07-27 RX ADMIN — TRAMADOL HYDROCHLORIDE 50 MILLIGRAM(S): 50 TABLET ORAL at 10:03

## 2021-07-27 RX ADMIN — SODIUM CHLORIDE 3 MILLILITER(S): 9 INJECTION INTRAMUSCULAR; INTRAVENOUS; SUBCUTANEOUS at 15:39

## 2021-07-27 RX ADMIN — Medication 975 MILLIGRAM(S): at 00:44

## 2021-07-27 RX ADMIN — BUMETANIDE 1 MILLIGRAM(S): 0.25 INJECTION INTRAMUSCULAR; INTRAVENOUS at 05:55

## 2021-07-27 RX ADMIN — SPIRONOLACTONE 12.5 MILLIGRAM(S): 25 TABLET, FILM COATED ORAL at 05:56

## 2021-07-27 RX ADMIN — Medication 975 MILLIGRAM(S): at 18:31

## 2021-07-27 RX ADMIN — Medication 1 TABLET(S): at 18:29

## 2021-07-27 RX ADMIN — TRAMADOL HYDROCHLORIDE 50 MILLIGRAM(S): 50 TABLET ORAL at 19:01

## 2021-07-27 RX ADMIN — BNT162B2 0.3 MILLILITER(S): 0.23 INJECTION, SUSPENSION INTRAMUSCULAR at 15:42

## 2021-07-27 RX ADMIN — BUMETANIDE 1 MILLIGRAM(S): 0.25 INJECTION INTRAMUSCULAR; INTRAVENOUS at 18:29

## 2021-07-27 RX ADMIN — Medication 1 TABLET(S): at 13:11

## 2021-07-27 RX ADMIN — Medication 975 MILLIGRAM(S): at 06:26

## 2021-07-27 RX ADMIN — APIXABAN 2.5 MILLIGRAM(S): 2.5 TABLET, FILM COATED ORAL at 05:55

## 2021-07-27 RX ADMIN — SODIUM CHLORIDE 3 MILLILITER(S): 9 INJECTION INTRAMUSCULAR; INTRAVENOUS; SUBCUTANEOUS at 22:23

## 2021-07-27 RX ADMIN — Medication 3: at 13:11

## 2021-07-27 RX ADMIN — Medication 81 MILLIGRAM(S): at 13:11

## 2021-07-27 RX ADMIN — APIXABAN 2.5 MILLIGRAM(S): 2.5 TABLET, FILM COATED ORAL at 18:29

## 2021-07-27 RX ADMIN — Medication 975 MILLIGRAM(S): at 19:01

## 2021-07-27 RX ADMIN — Medication 2: at 09:33

## 2021-07-27 RX ADMIN — Medication 975 MILLIGRAM(S): at 01:14

## 2021-07-27 RX ADMIN — TRAMADOL HYDROCHLORIDE 50 MILLIGRAM(S): 50 TABLET ORAL at 18:29

## 2021-07-27 RX ADMIN — Medication 150 MILLIGRAM(S): at 05:55

## 2021-07-27 RX ADMIN — GABAPENTIN 300 MILLIGRAM(S): 400 CAPSULE ORAL at 05:57

## 2021-07-27 RX ADMIN — PANTOPRAZOLE SODIUM 40 MILLIGRAM(S): 20 TABLET, DELAYED RELEASE ORAL at 05:56

## 2021-07-27 RX ADMIN — Medication 400 MILLIGRAM(S): at 13:11

## 2021-07-27 RX ADMIN — ATORVASTATIN CALCIUM 40 MILLIGRAM(S): 80 TABLET, FILM COATED ORAL at 22:27

## 2021-07-27 NOTE — PROGRESS NOTE ADULT - ATTENDING COMMENTS
PT. DVT ppx. f/u labs. DC planning
Pt. DVT ppx. DC planning
PT. DVT ppx. DC planning. f/u labs
PT. DVT ppx. DC planning. f/u labs
PT. DVT ppx. f/u labs

## 2021-07-28 VITALS
DIASTOLIC BLOOD PRESSURE: 77 MMHG | RESPIRATION RATE: 18 BRPM | OXYGEN SATURATION: 99 % | TEMPERATURE: 98 F | SYSTOLIC BLOOD PRESSURE: 126 MMHG | HEART RATE: 64 BPM

## 2021-07-28 LAB — GLUCOSE BLDC GLUCOMTR-MCNC: 231 MG/DL — HIGH (ref 70–99)

## 2021-07-28 PROCEDURE — U0003: CPT

## 2021-07-28 PROCEDURE — 97116 GAIT TRAINING THERAPY: CPT

## 2021-07-28 PROCEDURE — C9399: CPT

## 2021-07-28 PROCEDURE — C1776: CPT

## 2021-07-28 PROCEDURE — 93005 ELECTROCARDIOGRAM TRACING: CPT

## 2021-07-28 PROCEDURE — 71100 X-RAY EXAM RIBS UNI 2 VIEWS: CPT

## 2021-07-28 PROCEDURE — 97530 THERAPEUTIC ACTIVITIES: CPT

## 2021-07-28 PROCEDURE — 80048 BASIC METABOLIC PNL TOTAL CA: CPT

## 2021-07-28 PROCEDURE — 86769 SARS-COV-2 COVID-19 ANTIBODY: CPT

## 2021-07-28 PROCEDURE — 97535 SELF CARE MNGMENT TRAINING: CPT

## 2021-07-28 PROCEDURE — U0005: CPT

## 2021-07-28 PROCEDURE — 72170 X-RAY EXAM OF PELVIS: CPT

## 2021-07-28 PROCEDURE — 85027 COMPLETE CBC AUTOMATED: CPT

## 2021-07-28 PROCEDURE — 97110 THERAPEUTIC EXERCISES: CPT

## 2021-07-28 PROCEDURE — C9803: CPT

## 2021-07-28 PROCEDURE — 97161 PT EVAL LOW COMPLEX 20 MIN: CPT

## 2021-07-28 PROCEDURE — 82962 GLUCOSE BLOOD TEST: CPT

## 2021-07-28 PROCEDURE — C1713: CPT

## 2021-07-28 PROCEDURE — 97165 OT EVAL LOW COMPLEX 30 MIN: CPT

## 2021-07-28 RX ORDER — TRAMADOL HYDROCHLORIDE 50 MG/1
1 TABLET ORAL
Qty: 0 | Refills: 0 | DISCHARGE
Start: 2021-07-28

## 2021-07-28 RX ORDER — PANTOPRAZOLE SODIUM 20 MG/1
1 TABLET, DELAYED RELEASE ORAL
Qty: 0 | Refills: 0 | DISCHARGE
Start: 2021-07-28

## 2021-07-28 RX ORDER — SENNA PLUS 8.6 MG/1
2 TABLET ORAL
Qty: 0 | Refills: 0 | DISCHARGE
Start: 2021-07-28

## 2021-07-28 RX ORDER — POLYETHYLENE GLYCOL 3350 17 G/17G
17 POWDER, FOR SOLUTION ORAL
Qty: 0 | Refills: 0 | DISCHARGE
Start: 2021-07-28

## 2021-07-28 RX ADMIN — TRAMADOL HYDROCHLORIDE 50 MILLIGRAM(S): 50 TABLET ORAL at 09:22

## 2021-07-28 RX ADMIN — Medication 150 MILLIGRAM(S): at 05:39

## 2021-07-28 RX ADMIN — SODIUM CHLORIDE 3 MILLILITER(S): 9 INJECTION INTRAMUSCULAR; INTRAVENOUS; SUBCUTANEOUS at 05:32

## 2021-07-28 RX ADMIN — TRAMADOL HYDROCHLORIDE 50 MILLIGRAM(S): 50 TABLET ORAL at 01:18

## 2021-07-28 RX ADMIN — TRAMADOL HYDROCHLORIDE 50 MILLIGRAM(S): 50 TABLET ORAL at 01:48

## 2021-07-28 RX ADMIN — Medication 975 MILLIGRAM(S): at 05:37

## 2021-07-28 RX ADMIN — Medication 2: at 09:22

## 2021-07-28 RX ADMIN — Medication 975 MILLIGRAM(S): at 06:07

## 2021-07-28 RX ADMIN — Medication 975 MILLIGRAM(S): at 11:36

## 2021-07-28 RX ADMIN — APIXABAN 2.5 MILLIGRAM(S): 2.5 TABLET, FILM COATED ORAL at 05:37

## 2021-07-28 RX ADMIN — Medication 1 TABLET(S): at 05:36

## 2021-07-28 RX ADMIN — SPIRONOLACTONE 12.5 MILLIGRAM(S): 25 TABLET, FILM COATED ORAL at 05:41

## 2021-07-28 RX ADMIN — PANTOPRAZOLE SODIUM 40 MILLIGRAM(S): 20 TABLET, DELAYED RELEASE ORAL at 05:36

## 2021-07-28 RX ADMIN — AMLODIPINE BESYLATE 5 MILLIGRAM(S): 2.5 TABLET ORAL at 05:37

## 2021-07-28 RX ADMIN — Medication 81 MILLIGRAM(S): at 11:36

## 2021-07-28 RX ADMIN — GABAPENTIN 300 MILLIGRAM(S): 400 CAPSULE ORAL at 05:38

## 2021-07-28 RX ADMIN — BUMETANIDE 1 MILLIGRAM(S): 0.25 INJECTION INTRAMUSCULAR; INTRAVENOUS at 05:37

## 2021-07-28 RX ADMIN — Medication 975 MILLIGRAM(S): at 00:08

## 2021-07-28 NOTE — PROGRESS NOTE ADULT - SUBJECTIVE AND OBJECTIVE BOX
ORTHO ATTENDING POST OP    s/p R MARLA  WBAT R LE  Posterior hip precautions  Pt on eliquis and ASA 81 pre op- resume post op for cardiac and DVT ppx indications  venodynes  ancef x 24h  OOB to chair in AM  rehab consult  CBC in RR and AM   f/u medicine  
  Patient comfortable  No complaints    T(C): 36.9 (07-28-21 @ 05:10), Max: 36.9 (07-28-21 @ 05:10)  HR: 81 (07-28-21 @ 05:10) (67 - 124)  BP: 110/60 (07-28-21 @ 05:10) (103/67 - 141/66)  RR: 18 (07-28-21 @ 05:10) (18 - 18)  SpO2: 97% (07-28-21 @ 05:10) (94% - 97%)      PHYSICAL EXAM:  Gen: sleeping upon entering room, easily arousable, NAD, Alert and oriented X3  RLE: Right Hip Aquacel Dressing C/D/I; improved erythema around aquacel, dull sensation grossly intact to light touch; (+) DF/PF; (+) Distal Pulses; No Calf tenderness B/L, PAS     LABS:              RADIOLOGY & ADDITIONAL TESTS:    
Orthopaedic Surgery Progress Note    Subjective:   Patient seen and examined  No acute events overnight  Pain well controlled  Ambulated well with PT yesterday    Objective:  T(C): 36.8 (07-23-21 @ 04:42), Max: 36.8 (07-22-21 @ 06:37)  HR: 66 (07-23-21 @ 04:42) (57 - 77)  BP: 141/64 (07-23-21 @ 04:42) (91/50 - 149/67)  RR: 18 (07-23-21 @ 04:42) (11 - 22)  SpO2: 100% (07-23-21 @ 04:42) (94% - 100%)  Wt(kg): --    07-22 @ 07:01  -  07-23 @ 06:20  --------------------------------------------------------  IN: 1585 mL / OUT: 1200 mL / NET: 385 mL        PE    NAD  RLE  dressing C/D/I, abduction pillow in place  motor intact GS/TA/EHL  SILT S/S/SP/DP  WWP                          11.1   11.18 )-----------( 259      ( 22 Jul 2021 11:05 )             36.8     07-22    135  |  100  |  28<H>  ----------------------------<  180<H>  4.5   |  24  |  1.74<H>    Ca    9.1      22 Jul 2021 11:05            81y Female s/p R MARLA  - Pain control  - WBAT  - Posterior dislocation precautions, abduction pillow  - PT/OT/OOB  - DVT ppx  - Dispo planning
  Patient resting without complaints.  No chest pain, SOB, N/V      T(C): 37.2 (07-24-21 @ 05:22), Max: 37.2 (07-24-21 @ 05:22)  HR: 63 (07-24-21 @ 05:22) (63 - 83)  BP: 132/79 (07-24-21 @ 05:22) (114/50 - 132/83)  RR: 18 (07-24-21 @ 05:22) (18 - 18)  SpO2: 95% (07-24-21 @ 05:22) (95% - 100%)      Exam:  Alert and Oriented, No Acute Distress  Ext: RLE: Hip aquacel C/D/I, Abduction pillow in place, dull sensation grossly intact, compartments soft, DP2+,   +DF, +PF                          11.2   11.12 )-----------( 258      ( 23 Jul 2021 06:35 )             36.6    07-23    135  |  96  |  28<H>  ----------------------------<  196<H>  5.1   |  23  |  1.74<H>    Ca    9.5      23 Jul 2021 06:35    
Patient is a 81y old  Female who presents with a chief complaint of Right hip arthritis (26 Jul 2021 06:40)      POST OPERATIVE DAY #: 5  Patient comfortable  No complaints    VS:  T(C): 36.8 (07-27-21 @ 05:21), Max: 36.8 (07-27-21 @ 05:21)  T(F): 98.3 (07-27-21 @ 05:21), Max: 98.3 (07-27-21 @ 05:21)  HR: 124 (07-27-21 @ 05:21) (67 - 124)  BP: 149/79 (07-27-21 @ 05:21) (120/55 - 149/79)  RR: 18 (07-27-21 @ 05:21) (18 - 18)  SpO2: 97% (07-27-21 @ 05:21) (93% - 97%)  Wt(kg): --      PHYSICAL EXAM:  NAD, Alert  EXT:   Rt Hip: Aquacel Dressing C/D/I  No Calf Tenderness  (+) DF/PF; (+) Distal Pulses;  Sensation: No gross deficits noted  Pulses 2+   B/L, PAS                       
Post op Day [3]    Patient resting without complaints.  No chest pain, SOB, N/V.    T(C): 36.7 (07-25-21 @ 04:30), Max: 36.9 (07-24-21 @ 17:14)  HR: 114 (07-25-21 @ 05:37) (70 - 120)  BP: 131/79 (07-25-21 @ 04:30) (91/56 - 131/79)  RR: 20 (07-25-21 @ 05:37) (18 - 20)  SpO2: 93% (07-25-21 @ 05:37) (93% - 96%)      Exam:  Alert and Oriented, No Acute Distress  Lower Extremities: R Hip  Dressing: C/D/I w/ Aquacel, Hip Abduction pillow in place  Calves Soft, Non-tender bilaterally  +PF/DF/EHL/FHL  SILT  +DP Pulse                 
Patient is a 81y old  Female who presents with a chief complaint of Right hip pain, arthritis  Patient s/p Right total hip arthroplasty POD#4   Patient comfortable, confused at times at baseline  No complaints    T(C): 36.8 (07-26-21 @ 04:34), Max: 36.8 (07-25-21 @ 18:00)  HR: 65 (07-26-21 @ 04:34) (65 - 70)  BP: 103/64 (07-26-21 @ 04:34) (103/56 - 125/73)  RR: 18 (07-26-21 @ 04:34) (18 - 18)  SpO2: 94% (07-26-21 @ 04:34) (93% - 94%)    PHYSICAL EXAM:  NAD, Alert  [Right ] Hip: Dressing C/D/I; sensation grossly intact to light touch; (+) DF/PF; (+) Distal Pulses; No Calf tenderness B/L, PAS

## 2021-07-28 NOTE — PROGRESS NOTE ADULT - ASSESSMENT
A/P: 82 y/o F POD#6 s/p R MARLA    DVT ppx- Eliquis, ASA  RLE WBAT  PT  Pain management prn  Bactrim DS X 7 days for erythema surrounding aquacel  Discharge to Rehab  D/W attending      MÓNICA Berkowitz  Orthopedic Surgery  [phone]  
A/p: 81y Female POD #3 s/p R Total Hip Arthroplasty.  VSS. NAD.    PT/OT-WBAT RLE, Posterior hip precautions.  Hip Abduction pillow while in bed  Tachycardia: EKG showed SInus Tachycardia, most likely discomfort related tachycardia.  Patient received Metoprolol dose this AM.  WIll continue to monitor.  IS  DVT PPx: Eliquis 2.5mg PO BID, ASA 81mg PO Daily and SCDs  Pain Control  Continue Current Tx.  Dispo planning pending discussion with daughter.      Hiren Seay PA-C  Orthopedic Surgery Team  Team Pager: [phone]
A/P: 82 y/o F POD# 2 s/p R MARLA      DVT ppx- ASA 325mg PO BID  RLE WBAT  Posterior hip precautions  pain management prn  PT  OT  Discharge planning to home      MÓNICA Berkowitz  Orthopedic Surgery  [phone]  
80 y/o FM s/p right total hip arthroplasty POD#4, Patient and family requesting d/c to rehab, will d/c when bed available  Candida Jacobson PA-C  Orthopaedic Surgery  Team pager [phone]  [facility] [phone]  [phone]  
S/P R THR      Plan    continue present care  d/c planning in progress       Hailee Santos PA-C   Beeper    [phone]

## 2021-08-10 ENCOUNTER — APPOINTMENT (OUTPATIENT)
Dept: ORTHOPEDIC SURGERY | Facility: CLINIC | Age: 81
End: 2021-08-10
Payer: MEDICARE

## 2021-08-10 DIAGNOSIS — M16.11 UNILATERAL PRIMARY OSTEOARTHRITIS, RIGHT HIP: ICD-10-CM

## 2021-08-10 PROCEDURE — 99024 POSTOP FOLLOW-UP VISIT: CPT

## 2021-08-17 NOTE — DISCHARGE NOTE NURSING/CASE MANAGEMENT/SOCIAL WORK - MODE OF TRANSPORTATION
Additional Notes: Patient consent was obtained to proceed with the visit and recommended plan of care after discussion of all risks and benefits, including the risks of COVID-19 exposure.
Detail Level: Simple
Render Risk Assessment In Note?: yes
[place]

## 2021-08-30 NOTE — HISTORY OF PRESENT ILLNESS
Patient placed on droplet and contact precautions. Appropriate isolation signs placed outside door.  
[FreeTextEntry1] : Patient is an 81 year old woman with a history of HTN, hyperlipidemia, type 2 diabetes mellitus, breast cancer status post right partial mastectomy, rectal carcinoma status post resection, anemia, hypokalemia, diastolic heart failure, right lower extremity DVT, CAD s/p 2 vessel CABG 11/9/2020 and renal insufficiency who presents today for follow up of HTN and heart failure. She was recently hospitalized from March 10-18th for acute heart failure exacerbation and for lower extremity cellulitis. While she was in the hospital she was given IV diuretics with improvement of her edema, pulmonary HTN, and dyspnea. Her Bumex was increased to 1 mg twice daily, and she was started on Spironolactone 25 mg daily.  She is scheduled to see her nephrologist Dr. Kan tomorrow. She also has VNS coming to her house. As per her daughter her weight was 178lbs on Monday, it was 180lbs yesterday, and it was 182 lbs earlier today. She has not yet taken her diuretics today. Currently she denies any chest pain, palpitations, dyspnea at rest, headaches, or dizziness. Her right upper extremity swelling due to chronic lymphedema and dyspnea with exertion are unchanged.

## 2021-09-14 ENCOUNTER — APPOINTMENT (OUTPATIENT)
Dept: INTERNAL MEDICINE | Facility: CLINIC | Age: 81
End: 2021-09-14
Payer: MEDICARE

## 2021-09-14 VITALS
HEART RATE: 71 BPM | RESPIRATION RATE: 12 BRPM | BODY MASS INDEX: 35.14 KG/M2 | OXYGEN SATURATION: 98 % | WEIGHT: 179 LBS | TEMPERATURE: 96.8 F | HEIGHT: 60 IN

## 2021-09-14 VITALS — DIASTOLIC BLOOD PRESSURE: 70 MMHG | SYSTOLIC BLOOD PRESSURE: 120 MMHG

## 2021-09-14 PROCEDURE — 99214 OFFICE O/P EST MOD 30 MIN: CPT | Mod: 25

## 2021-09-14 PROCEDURE — 90662 IIV NO PRSV INCREASED AG IM: CPT

## 2021-09-14 PROCEDURE — G0008: CPT

## 2021-09-14 RX ORDER — ADHESIVE TAPE 3"X 2.3 YD
50 MCG TAPE, NON-MEDICATED TOPICAL
Qty: 90 | Refills: 3 | Status: ACTIVE | OUTPATIENT
Start: 2018-05-11

## 2021-09-14 RX ORDER — CHLORHEXIDINE GLUCONATE 4 %
1000 LIQUID (ML) TOPICAL DAILY
Qty: 90 | Refills: 3 | Status: ACTIVE | COMMUNITY
Start: 2018-05-11

## 2021-09-14 NOTE — PHYSICAL EXAM
[No Acute Distress] : no acute distress [Well Developed] : well developed [Well Nourished] : well nourished [Normal Sclera/Conjunctiva] : normal sclera/conjunctiva [No JVD] : no jugular venous distention [No Lymphadenopathy] : no lymphadenopathy [Supple] : supple [Thyroid Normal, No Nodules] : the thyroid was normal and there were no nodules present [No Respiratory Distress] : no respiratory distress  [No Accessory Muscle Use] : no accessory muscle use [Clear to Auscultation] : lungs were clear to auscultation bilaterally [Normal Rate] : normal rate  [Regular Rhythm] : with a regular rhythm [Normal S1, S2] : normal S1 and S2 [No Murmur] : no murmur heard [Soft] : abdomen soft [Non Tender] : non-tender [Non-distended] : non-distended [Normal Bowel Sounds] : normal bowel sounds [No CVA Tenderness] : no CVA  tenderness [No Spinal Tenderness] : no spinal tenderness [No Focal Deficits] : no focal deficits [Normal Affect] : the affect was normal [Alert and Oriented x3] : oriented to person, place, and time [Normal Insight/Judgement] : insight and judgment were intact [de-identified] : Trace non pitting edema of the right lower extremity above the foot, chronic stasis changes [de-identified] : no range of motion of the right hip, with extreme tenderness [de-identified] : Wheelchair-bound.  Needs assist device.

## 2021-09-14 NOTE — REVIEW OF SYSTEMS
[Recent Change In Weight] : ~T recent weight change [Leg Claudication] : leg claudication [Nocturia] : nocturia [Frequency] : frequency [Joint Pain] : joint pain [Joint Stiffness] : joint stiffness [Skin Rash] : skin rash [Unsteady Walking] : ataxia [Anxiety] : anxiety [Negative] : Heme/Lymph [Fever] : no fever [Chills] : no chills [Fatigue] : no fatigue [Hot Flashes] : no hot flashes [Night Sweats] : no night sweats [Chest Pain] : no chest pain [Palpitations] : no palpitations [Lower Ext Edema] : no lower extremity edema [Orthopnea] : no orthopnea [Paroxysmal Nocturnal Dyspnea] : no paroxysmal nocturnal dyspnea [Dysuria] : no dysuria [Incontinence] : no incontinence [Hematuria] : no hematuria [Vaginal Discharge] : no vaginal discharge [Joint Swelling] : no joint swelling [Muscle Weakness] : no muscle weakness [Muscle Pain] : no muscle pain [Back Pain] : no back pain [Itching] : no itching [Headache] : no headache [Dizziness] : no dizziness [Fainting] : no fainting [Confusion] : no confusion [Memory Loss] : no memory loss [Suicidal] : not suicidal [Insomnia] : no insomnia [Depression] : no depression [FreeTextEntry9] : RIGHT HIP, LT SHOULDER  [FreeTextEntry2] : SEE HPI [de-identified] : Chronic right lower extremity changes [de-identified] : Wheelchair-bound

## 2021-09-15 DIAGNOSIS — E11.29 TYPE 2 DIABETES MELLITUS WITH OTHER DIABETIC KIDNEY COMPLICATION: ICD-10-CM

## 2021-09-15 DIAGNOSIS — E11.65 TYPE 2 DIABETES MELLITUS WITH OTHER DIABETIC KIDNEY COMPLICATION: ICD-10-CM

## 2021-09-15 LAB
25(OH)D3 SERPL-MCNC: 43.6 NG/ML
ALBUMIN SERPL ELPH-MCNC: 4 G/DL
ALP BLD-CCNC: 105 U/L
ALT SERPL-CCNC: 9 U/L
ANION GAP SERPL CALC-SCNC: 16 MMOL/L
AST SERPL-CCNC: 15 U/L
BILIRUB SERPL-MCNC: 0.2 MG/DL
BUN SERPL-MCNC: 34 MG/DL
CALCIUM SERPL-MCNC: 10.1 MG/DL
CHLORIDE SERPL-SCNC: 102 MMOL/L
CHOLEST SERPL-MCNC: 132 MG/DL
CO2 SERPL-SCNC: 21 MMOL/L
CREAT SERPL-MCNC: 1.9 MG/DL
CREAT SPEC-SCNC: 45 MG/DL
ESTIMATED AVERAGE GLUCOSE: 200 MG/DL
FERRITIN SERPL-MCNC: 35 NG/ML
FOLATE SERPL-MCNC: 19.2 NG/ML
GLUCOSE SERPL-MCNC: 169 MG/DL
GLUCOSE SERPL-MCNC: 206 MG/DL
HBA1C MFR BLD HPLC: 8.6 %
HDLC SERPL-MCNC: 46 MG/DL
IRON SERPL-MCNC: 32 UG/DL
LDLC SERPL CALC-MCNC: 56 MG/DL
MAGNESIUM SERPL-MCNC: 2 MG/DL
MICROALBUMIN 24H UR DL<=1MG/L-MCNC: 1.8 MG/DL
MICROALBUMIN/CREAT 24H UR-RTO: 39 MG/G
NONHDLC SERPL-MCNC: 86 MG/DL
POTASSIUM SERPL-SCNC: 4.1 MMOL/L
PROT SERPL-MCNC: 7.8 G/DL
SODIUM SERPL-SCNC: 139 MMOL/L
T4 FREE SERPL-MCNC: 1.4 NG/DL
TRIGL SERPL-MCNC: 154 MG/DL
TSH SERPL-ACNC: 0.99 UIU/ML
VIT B12 SERPL-MCNC: 933 PG/ML

## 2021-09-16 LAB
BASOPHILS # BLD AUTO: 0.03 K/UL
BASOPHILS NFR BLD AUTO: 0.4 %
EOSINOPHIL # BLD AUTO: 0.03 K/UL
EOSINOPHIL NFR BLD AUTO: 0.4 %
HCT VFR BLD CALC: 39.1 %
HGB BLD-MCNC: 11.2 G/DL
IMM GRANULOCYTES NFR BLD AUTO: 0.2 %
LYMPHOCYTES # BLD AUTO: 1.47 K/UL
LYMPHOCYTES NFR BLD AUTO: 17.6 %
MAN DIFF?: NORMAL
MCHC RBC-ENTMCNC: 27.6 PG
MCHC RBC-ENTMCNC: 28.6 GM/DL
MCV RBC AUTO: 96.3 FL
MONOCYTES # BLD AUTO: 0.63 K/UL
MONOCYTES NFR BLD AUTO: 7.6 %
NEUTROPHILS # BLD AUTO: 6.16 K/UL
NEUTROPHILS NFR BLD AUTO: 73.8 %
PLATELET # BLD AUTO: 401 K/UL
RBC # BLD: 4.06 M/UL
RBC # FLD: 16.1 %
WBC # FLD AUTO: 8.34 K/UL

## 2021-09-16 RX ORDER — INSULIN GLARGINE 100 [IU]/ML
100 INJECTION, SOLUTION SUBCUTANEOUS
Qty: 1 | Refills: 12 | Status: COMPLETED | COMMUNITY
Start: 2021-09-15 | End: 2021-09-16

## 2021-09-16 RX ORDER — GABAPENTIN 300 MG/1
300 TABLET, FILM COATED ORAL
Qty: 30 | Refills: 0 | Status: DISCONTINUED | COMMUNITY
Start: 2021-07-06

## 2021-09-17 DIAGNOSIS — N30.90 CYSTITIS, UNSPECIFIED W/OUT HEMATURIA: ICD-10-CM

## 2021-09-17 LAB
APPEARANCE: ABNORMAL
BACTERIA: ABNORMAL
BILIRUBIN URINE: NEGATIVE
BLOOD URINE: NEGATIVE
COLOR: NORMAL
GLUCOSE QUALITATIVE U: NEGATIVE
HYALINE CASTS: 0 /LPF
KETONES URINE: NEGATIVE
LEUKOCYTE ESTERASE URINE: ABNORMAL
MICROSCOPIC-UA: NORMAL
NITRITE URINE: POSITIVE
PH URINE: 5
PROTEIN URINE: NEGATIVE
RED BLOOD CELLS URINE: 1 /HPF
SPECIFIC GRAVITY URINE: 1.01
SQUAMOUS EPITHELIAL CELLS: 0 /HPF
UROBILINOGEN URINE: NORMAL
WHITE BLOOD CELLS URINE: 26 /HPF

## 2021-09-20 LAB — BACTERIA UR CULT: ABNORMAL

## 2021-10-12 ENCOUNTER — APPOINTMENT (OUTPATIENT)
Dept: ORTHOPEDIC SURGERY | Facility: CLINIC | Age: 81
End: 2021-10-12
Payer: MEDICARE

## 2021-10-12 VITALS — BODY MASS INDEX: 33.23 KG/M2 | HEIGHT: 61 IN | WEIGHT: 176 LBS

## 2021-10-12 DIAGNOSIS — Z96.641 PRESENCE OF RIGHT ARTIFICIAL HIP JOINT: ICD-10-CM

## 2021-10-12 PROCEDURE — 99024 POSTOP FOLLOW-UP VISIT: CPT

## 2021-10-12 PROCEDURE — 72170 X-RAY EXAM OF PELVIS: CPT

## 2021-10-15 PROBLEM — Z96.641 STATUS POST RIGHT HIP REPLACEMENT: Status: ACTIVE | Noted: 2021-08-09

## 2021-11-01 ENCOUNTER — NON-APPOINTMENT (OUTPATIENT)
Age: 81
End: 2021-11-01

## 2021-11-01 ENCOUNTER — APPOINTMENT (OUTPATIENT)
Dept: CARDIOLOGY | Facility: CLINIC | Age: 81
End: 2021-11-01
Payer: MEDICARE

## 2021-11-01 VITALS
HEIGHT: 61 IN | WEIGHT: 190 LBS | OXYGEN SATURATION: 96 % | RESPIRATION RATE: 12 BRPM | DIASTOLIC BLOOD PRESSURE: 82 MMHG | BODY MASS INDEX: 35.87 KG/M2 | SYSTOLIC BLOOD PRESSURE: 155 MMHG | TEMPERATURE: 96.9 F | HEART RATE: 76 BPM

## 2021-11-01 DIAGNOSIS — N39.0 URINARY TRACT INFECTION, SITE NOT SPECIFIED: ICD-10-CM

## 2021-11-01 PROCEDURE — 99214 OFFICE O/P EST MOD 30 MIN: CPT | Mod: 25

## 2021-11-01 PROCEDURE — 93000 ELECTROCARDIOGRAM COMPLETE: CPT

## 2021-11-02 LAB
ANION GAP SERPL CALC-SCNC: 15 MMOL/L
APPEARANCE: ABNORMAL
BACTERIA: ABNORMAL
BILIRUBIN URINE: NEGATIVE
BLOOD URINE: NEGATIVE
BUN SERPL-MCNC: 28 MG/DL
CALCIUM SERPL-MCNC: 9.7 MG/DL
CHLORIDE SERPL-SCNC: 104 MMOL/L
CO2 SERPL-SCNC: 24 MMOL/L
COLOR: NORMAL
CREAT SERPL-MCNC: 1.5 MG/DL
GLUCOSE QUALITATIVE U: ABNORMAL
GLUCOSE SERPL-MCNC: 122 MG/DL
KETONES URINE: NEGATIVE
LEUKOCYTE ESTERASE URINE: NEGATIVE
MICROSCOPIC-UA: NORMAL
NITRITE URINE: POSITIVE
PH URINE: 6
POTASSIUM SERPL-SCNC: 4.7 MMOL/L
PROTEIN URINE: ABNORMAL
RED BLOOD CELLS URINE: 1 /HPF
SODIUM SERPL-SCNC: 142 MMOL/L
SPECIFIC GRAVITY URINE: 1.02
SQUAMOUS EPITHELIAL CELLS: 1 /HPF
URINE COMMENTS: NORMAL
UROBILINOGEN URINE: NORMAL
WHITE BLOOD CELLS URINE: 25 /HPF

## 2021-11-02 NOTE — HISTORY OF PRESENT ILLNESS
[FreeTextEntry1] : Patient is an 81 year old woman with a history of HTN, hyperlipidemia, type 2 diabetes mellitus, breast cancer status post right partial mastectomy, rectal carcinoma status post resection, anemia, hypokalemia, diastolic heart failure, right lower extremity DVT, CAD s/p 2 vessel CABG 11/9/2020 and renal insufficiency who presents today for follow up of HTN and heart failure. Her daughter states that she has been less active because she has been sad and under increased stress as her son passed away. She states that she has gained weight over the past 4-6 weeks which correlates to the time that the dose of her Bumex was decreased to once daily. She otherwise denies any chest pain, palpitations, dyspnea at rest, headaches, or dizziness. Her right upper extremity swelling due to chronic lymphedema and dyspnea with exertion are unchanged. She states that her joint pain is improved since having her hip surgery. \par

## 2021-11-02 NOTE — DISCUSSION/SUMMARY
[FreeTextEntry1] : IMPRESSION: Ms. Batista is an 81 year old woman with a history of HTN, hyperlipidemia, type 2 diabetes mellitus, breast cancer status post right partial mastectomy, rectal carcinoma status post resection, diastolic heart failure, anemia, hypokalemia, CAD s/p 2-vessel CABG 11/9/2020, and renal insufficiency who presents today for follow up of HTN and heart failure.\par \par PLAN:\par 1. She is s/p CABG 11/2020 and has not experienced any chest pain since her surgery.  She will continue on Aspirin 81 mg daily. Her ECG that was performed today revealed sinus rhythm without any ectopy and was relatively unchanged from previous. Her weight has increased since the dose of her Bumex was decreased. I have asked her daughter to follow her mom's weights. She will given her Bumex 1 mg twice daily for the next 3 days and then change her Bumex to 1 mg daily four days a week and 1 mg twice daily three times a week. She will also continue on Spironolactone 12.5 mg daily as long as her potassium and creatinine are stable. She will also continue on a low sodium diet and restrict her fluid intake. \par 2. She will continue on Eliquis 2.5 mg twice daily for her DVT and she will continue to follow up with Vascular Cardiology. \par 3. Her blood pressure is elevated, which will hopefully improve on the higher dose of Bumex. She will also continue on Metoprolol  mg daily and Amlodipine 5 mg daily.\par 4. Her most recent LDL was very good with mildly elevated triglycerides. She will continue on Lipitor 40 mg daily along with a low cholesterol diet. \par 5. She will follow up with me in 6 weeks or sooner if she is symptomatic. Her daughter will notify me should her weight trend up. \par 6. I will be checking a urinalysis and urine culture at her request.

## 2021-11-02 NOTE — PHYSICAL EXAM
[General Appearance - Well Developed] : well developed [Normal Appearance] : normal appearance [Well Groomed] : well groomed [General Appearance - Well Nourished] : well nourished [No Deformities] : no deformities [General Appearance - In No Acute Distress] : no acute distress [Normal Conjunctiva] : the conjunctiva exhibited no abnormalities [Normal Jugular Venous A Waves Present] : normal jugular venous A waves present [Normal Jugular Venous V Waves Present] : normal jugular venous V waves present [Respiration, Rhythm And Depth] : normal respiratory rhythm and effort [Exaggerated Use Of Accessory Muscles For Inspiration] : no accessory muscle use [Auscultation Breath Sounds / Voice Sounds] : lungs were clear to auscultation bilaterally [Normal Rate] : normal [Rhythm Regular] : regular [Normal S1] : normal S1 [Normal S2] : normal S2 [II] : a grade 2 [Right Carotid Bruit] : no bruit heard over the right carotid [Left Carotid Bruit] : no bruit heard over the left carotid [Bruit] : no bruit heard [___ +] : bilateral [unfilled]U+ pretibial pitting edema [Bowel Sounds] : normal bowel sounds [Abdomen Soft] : soft [Abdomen Tenderness] : non-tender [FreeTextEntry1] : Her gait is slow secondary to joint pain. [Nail Clubbing] : no clubbing of the fingernails [Cyanosis, Localized] : no localized cyanosis [Petechial Hemorrhages (___cm)] : no petechial hemorrhages [Skin Color & Pigmentation] : normal skin color and pigmentation [] : no rash [No Skin Ulcers] : no skin ulcer [Oriented To Time, Place, And Person] : oriented to person, place, and time [Affect] : the affect was normal [Mood] : the mood was normal [No Anxiety] : not feeling anxious

## 2021-11-05 NOTE — PHYSICAL THERAPY INITIAL EVALUATION ADULT - PERSONAL SAFETY AND JUDGMENT, REHAB EVAL
GENERAL PRE-PROCEDURE:   Procedure:  Coronary angiogram with possible intervention, right heart catheterization, endomyocardial biopsy  Date/Time:  11/5/2021 8:28 AM    Written consent obtained?: Yes    Risks and benefits: Risks, benefits and alternatives were discussed    Consent given by:  Patient  Patient states understanding of procedure being performed: Yes    Patient's understanding of procedure matches consent: Yes    Procedure consent matches procedure scheduled: Yes    Expected level of sedation:  Moderate  Appropriately NPO:  Yes  ASA Class:  2  Mallampati  :  Grade 1- soft palate, uvula, tonsillar pillars, and posterior pharyngeal wall visible  Lungs:  Other (comment)  Lung exam comment:  Left lung with diminished breath sounds half way up lung field. Right lung with rales in base otherwise clear  Heart:  Normal heart sounds and rate  History & Physical reviewed:  History and physical reviewed and no updates needed  Statement of review:  I have reviewed the lab findings, diagnostic data, medications, and the plan for sedation    
intact

## 2021-11-09 LAB — BACTERIA UR CULT: ABNORMAL

## 2021-11-09 RX ORDER — CIPROFLOXACIN HYDROCHLORIDE 250 MG/1
250 TABLET, FILM COATED ORAL
Qty: 14 | Refills: 0 | Status: DISCONTINUED | COMMUNITY
Start: 2021-09-17 | End: 2021-11-09

## 2021-12-14 ENCOUNTER — APPOINTMENT (OUTPATIENT)
Dept: CARDIOLOGY | Facility: CLINIC | Age: 81
End: 2021-12-14
Payer: MEDICARE

## 2021-12-14 ENCOUNTER — APPOINTMENT (OUTPATIENT)
Dept: INTERNAL MEDICINE | Facility: CLINIC | Age: 81
End: 2021-12-14
Payer: MEDICARE

## 2021-12-14 ENCOUNTER — NON-APPOINTMENT (OUTPATIENT)
Age: 81
End: 2021-12-14

## 2021-12-14 VITALS
BODY MASS INDEX: 34.92 KG/M2 | WEIGHT: 184.96 LBS | HEIGHT: 61 IN | OXYGEN SATURATION: 98 % | RESPIRATION RATE: 12 BRPM | TEMPERATURE: 97.3 F | SYSTOLIC BLOOD PRESSURE: 115 MMHG | HEART RATE: 76 BPM | DIASTOLIC BLOOD PRESSURE: 74 MMHG

## 2021-12-14 DIAGNOSIS — R82.90 UNSPECIFIED ABNORMAL FINDINGS IN URINE: ICD-10-CM

## 2021-12-14 DIAGNOSIS — R60.9 EDEMA, UNSPECIFIED: ICD-10-CM

## 2021-12-14 PROCEDURE — 99214 OFFICE O/P EST MOD 30 MIN: CPT | Mod: 25

## 2021-12-14 PROCEDURE — 99214 OFFICE O/P EST MOD 30 MIN: CPT

## 2021-12-14 PROCEDURE — 93000 ELECTROCARDIOGRAM COMPLETE: CPT

## 2021-12-14 RX ORDER — OXYCODONE HYDROCHLORIDE AND ACETAMINOPHEN 5; 325 MG/1; MG/1
5-325 TABLET ORAL EVERY 6 HOURS
Refills: 0 | Status: DISCONTINUED | COMMUNITY
End: 2021-12-14

## 2021-12-14 NOTE — PLAN
[FreeTextEntry1] : Follow-up\par \par Declines GI referral for colonoscopy at this time\par \par 1. HTN/HLD/HF/DVT history \par Follows with Dr. Hagan- has appt today\par cont Amlodipine 5 mg daily\par cont Metoprolol 150 mg daily\par cont Bumetanide 1 mg daily ( night dose on Mondays & Fridays) \par cont Spironolactone 25 mg 1/2 tab daily\par cont Atorvastatin 40 mg daily\par Cont ASA 81 mg daily\par cont Eliquis 2.5 mg daily\par will monitor LFT/ renal function / electrolytes \par \par 2. Diabetes\par Low carb, low sugar diet/ increased physical activity\par cont Januvia 25 mg daily\par cont Glimepiride 2 mg dialy> side effects d/w pt and pt daughter \par Currently not taking Levemir\par will check A1C today \par Endocrine referral \par \par 3. CKD \par Follows with Dr. Kan ( Nephrology) every 6 months \par will monitor renal function \par \par 4. History of breast cancer/ colon cancer \par Mammo referral \par decline oncology follow up \par \par Labs ordered pt to follow-up in 1 week for results \par

## 2021-12-14 NOTE — HISTORY OF PRESENT ILLNESS
[de-identified] : Patient is an 81 year old female accompanied by her daughter with a history of HTN, hyperlipidemia, type 2 diabetes mellitus, breast cancer status post right partial mastectomy, rectal carcinoma status post resection, anemia, hypokalemia, diastolic heart failure, right lower extremity DVT, CAD s/p 2 vessel CABG 11/9/2020 and renal insufficiency who presents today for follow up on chronic problems\par \par She feels well, offers no complaints\par She was treated for UTI last month and completed her full course of antibiotics. Denies fevers, chills, or urinary symptoms currently\par She was suppose to start Levemir to help control her diabetes, but refusing insulin at this time as per her daughter. She tries to maintain a low carb, low sugar diet. \par Had recent right hip replacement 07/22/2021\par \par History of breast Ca and Colon Ca currently not following with Oncology, referral for mammo given \par Denies CP, SOB, HA, N/V or abdominal pain

## 2021-12-14 NOTE — PHYSICAL EXAM

## 2021-12-18 ENCOUNTER — NON-APPOINTMENT (OUTPATIENT)
Age: 81
End: 2021-12-18

## 2021-12-19 NOTE — DISCUSSION/SUMMARY
[FreeTextEntry1] : IMPRESSION: Ms. Batista is an 81 year old woman with a history of HTN, hyperlipidemia, type 2 diabetes mellitus, breast cancer status post right partial mastectomy, rectal carcinoma status post resection, diastolic heart failure, anemia, hypokalemia, CAD s/p 2-vessel CABG 11/9/2020, and renal insufficiency who presents today for follow up of HTN and heart failure.\par \par PLAN:\par 1. She is s/p CABG 11/2020 and has not experienced any chest pain since her surgery.  She will continue on Aspirin 81 mg daily. Her ECG that was performed today revealed sinus rhythm without any ectopy and was relatively unchanged from previous. Her weight has remained stable. She will continue on Bumex 1 mg twice daily on Mondays and Fridays, and 1 mg daily the other five days. She will also continue on Spironolactone 12.5 mg daily as long as her potassium and creatinine are stable. She will have bloodwork done later today with you. She will also continue on a low sodium diet and restrict her fluid intake. \par 2. She will continue on Eliquis 2.5 mg twice daily for her DVT and she will continue to follow up with Vascular Cardiology. She was borderline anemic, but stable on her most recent blood work.\par 3. Her blood pressure is very well controlled. She will continue on Metoprolol  mg daily and Amlodipine 5 mg daily, in addition to her diuretic regimen.\par 4. Her most recent LDL was very good with mildly elevated triglycerides. She will continue on Lipitor 40 mg daily along with a low cholesterol diet. \par 5. She will follow up with me in 3 months or sooner if she is symptomatic. Her daughter will notify me should her weight trend up.

## 2021-12-19 NOTE — CARDIOLOGY SUMMARY
[___] : [unfilled] [de-identified] : 12/14/2021: Sinus Rhythm at 75 bpm with low voltage QRS and an old anteroseptal infarct\par

## 2021-12-19 NOTE — PHYSICAL EXAM
[General Appearance - Well Developed] : well developed [Normal Appearance] : normal appearance [Well Groomed] : well groomed [General Appearance - Well Nourished] : well nourished [No Deformities] : no deformities [General Appearance - In No Acute Distress] : no acute distress [Normal Conjunctiva] : the conjunctiva exhibited no abnormalities [Normal Jugular Venous A Waves Present] : normal jugular venous A waves present [Normal Jugular Venous V Waves Present] : normal jugular venous V waves present [Respiration, Rhythm And Depth] : normal respiratory rhythm and effort [Exaggerated Use Of Accessory Muscles For Inspiration] : no accessory muscle use [Auscultation Breath Sounds / Voice Sounds] : lungs were clear to auscultation bilaterally [Normal Rate] : normal [Rhythm Regular] : regular [Normal S1] : normal S1 [Normal S2] : normal S2 [II] : a grade 2 [___ +] : bilateral [unfilled]U+ pretibial pitting edema [Bowel Sounds] : normal bowel sounds [Abdomen Soft] : soft [Abdomen Tenderness] : non-tender [Nail Clubbing] : no clubbing of the fingernails [Cyanosis, Localized] : no localized cyanosis [Petechial Hemorrhages (___cm)] : no petechial hemorrhages [Skin Color & Pigmentation] : normal skin color and pigmentation [] : no rash [No Skin Ulcers] : no skin ulcer [Oriented To Time, Place, And Person] : oriented to person, place, and time [Affect] : the affect was normal [Mood] : the mood was normal [No Anxiety] : not feeling anxious [Right Carotid Bruit] : no bruit heard over the right carotid [Left Carotid Bruit] : no bruit heard over the left carotid [Bruit] : no bruit heard [FreeTextEntry1] : Her gait is slow secondary to joint pain.

## 2021-12-19 NOTE — HISTORY OF PRESENT ILLNESS
[FreeTextEntry1] : Patient is an 81 year old woman with a history of HTN, hyperlipidemia, type 2 diabetes mellitus, breast cancer status post right partial mastectomy, rectal carcinoma status post resection, anemia, hypokalemia, diastolic heart failure, right lower extremity DVT, CAD s/p 2 vessel CABG 11/9/2020 and renal insufficiency who presents today for follow up of HTN and heart failure. Her daughter states that she has been less active because she has been sad and under increased stress as her son passed away. She otherwise denies any chest pain, palpitations, dyspnea at rest, headaches, or dizziness. Her right upper extremity swelling due to chronic lymphedema and dyspnea with exertion are unchanged. As per her daughter, right now she is taking Bumex 1mg BID on Monday and Friday and 1mg daily the other 5 days. Her weight has been steady at 180 or 181 lbs.

## 2021-12-21 ENCOUNTER — APPOINTMENT (OUTPATIENT)
Dept: INTERNAL MEDICINE | Facility: CLINIC | Age: 81
End: 2021-12-21
Payer: MEDICARE

## 2021-12-21 LAB
ALBUMIN SERPL ELPH-MCNC: 4 G/DL
ALP BLD-CCNC: 102 U/L
ALT SERPL-CCNC: 11 U/L
ANION GAP SERPL CALC-SCNC: 13 MMOL/L
APPEARANCE: CLEAR
AST SERPL-CCNC: 15 U/L
BACTERIA UR CULT: NORMAL
BACTERIA: NEGATIVE
BILIRUB SERPL-MCNC: 0.3 MG/DL
BILIRUBIN URINE: NEGATIVE
BLOOD URINE: NEGATIVE
BUN SERPL-MCNC: 32 MG/DL
CALCIUM SERPL-MCNC: 9.8 MG/DL
CHLORIDE SERPL-SCNC: 102 MMOL/L
CHOLEST SERPL-MCNC: 147 MG/DL
CO2 SERPL-SCNC: 25 MMOL/L
COLOR: YELLOW
CREAT SERPL-MCNC: 1.67 MG/DL
ESTIMATED AVERAGE GLUCOSE: 177 MG/DL
GLUCOSE QUALITATIVE U: NEGATIVE
GLUCOSE SERPL-MCNC: 207 MG/DL
HBA1C MFR BLD HPLC: 7.8 %
HDLC SERPL-MCNC: 46 MG/DL
KETONES URINE: NORMAL
LDLC SERPL CALC-MCNC: 71 MG/DL
LEUKOCYTE ESTERASE URINE: NEGATIVE
MICROSCOPIC-UA: NORMAL
NITRITE URINE: POSITIVE
NONHDLC SERPL-MCNC: 101 MG/DL
PH URINE: 8
POTASSIUM SERPL-SCNC: 4.9 MMOL/L
PROT SERPL-MCNC: 7.5 G/DL
PROTEIN URINE: ABNORMAL
RED BLOOD CELLS URINE: 1 /HPF
SODIUM SERPL-SCNC: 140 MMOL/L
SPECIFIC GRAVITY URINE: 1.01
SQUAMOUS EPITHELIAL CELLS: 15 /HPF
TRIGL SERPL-MCNC: 148 MG/DL
TRIPLE PHOSPHATE CRYSTALS: ABNORMAL
UROBILINOGEN URINE: NORMAL
WHITE BLOOD CELLS URINE: 10 /HPF

## 2021-12-21 PROCEDURE — 99214 OFFICE O/P EST MOD 30 MIN: CPT | Mod: 95

## 2021-12-25 NOTE — HISTORY OF PRESENT ILLNESS
[Home] : at home, [unfilled] , at the time of the visit. [Medical Office: ([facility])___] : at the medical office located in  [Family Member] : family member [de-identified] : Patient is an 81 year old female accompanied by her daughter Shauna  with a history of HTN, hyperlipidemia, type 2 diabetes mellitus, breast cancer status post right partial mastectomy, rectal carcinoma status post resection, anemia, hypokalemia, diastolic heart failure, right lower extremity DVT, CAD s/p 2 vessel CABG 11/9/2020 and renal insufficiency who presents today for follow up on lab results \par \par She feels well, offers no complaints\par \par Had recent right hip replacement 07/22/2021\par \par History of breast Ca and Colon Ca currently not following with Oncology, referral for mammo given \par Denies CP, SOB, HA, N/V or abdominal pain

## 2021-12-25 NOTE — PLAN
[FreeTextEntry1] : \par \par 1. HTN/HLD/HF/DVT history \par cont Amlodipine 5 mg daily\par cont Metoprolol 150 mg daily\par cont Bumetanide 1 mg daily ( night dose on Mondays & Fridays) \par cont Spironolactone 25 mg 1/2 tab daily\par cont Atorvastatin 40 mg daily\par Cont ASA 81 mg daily\par cont Eliquis 2.5 mg daily\par 2. CKD. stable\par nephrology follow up\par \par 3. Diabetes\par Low carb, low sugar diet/ increased physical activity\par cont Januvia 25 mg daily\par cont Glimepiride 2 mg dialy> side effects d/w pt and pt daughter \par Endocrine referral \par \par \par 4. History of breast cancer/ colon cancer \par Mammo pending \par decline oncology follow up \par \par

## 2022-01-01 NOTE — HISTORY OF PRESENT ILLNESS
[FreeTextEntry1] : Patient is an 80 year old woman with a history of HTN, hyperlipidemia, type 2 diabetes mellitus, breast cancer status post right partial mastectomy, rectal carcinoma status post resection, anemia, hypokalemia, diastolic heart failure, right lower extremity DVT, CAD s/p 2 vessel CABG 11/9/2020 and renal insufficiency who presents today for follow up of HTN and heart failure. At the beginning of November she was hospitalized for shortness of breath and right hip pain. She had a cardiac catheterization done and had subsequent 2-vessel CABG.  After the surgery she was in rehab until the end of December. Her shortness of breath has improved since the surgery. On January 1st she went back to the hospital for right lower extremity swelling, erythema and pain. She had LE doppler done that was negative for DVT and she was initially treated for cellulitis, which was then stopped. Currently she denies any chest pain, palpitations, dyspnea at rest, headaches, dizziness, or palpitations. Her right upper extremity swelling due to chronic lymphedema and dyspnea with exertion are unchanged. Her daughter states they they have been monitoring her weights at home, and they have been stable. She also has a visiting nurse coming to the house. She continues to experience severe right hip and leg pain, for which she will be following up with orthopedics.

## 2022-01-01 NOTE — DISCUSSION/SUMMARY
[FreeTextEntry1] : IMPRESSION: Ms. Batista is an 80 year old woman with a history of HTN, hyperlipidemia, type 2 diabetes mellitus, breast cancer status post right partial mastectomy, rectal carcinoma status post resection, diastolic heart failure, anemia, hypokalemia, CAD s/p 2-vessel CABG 11/9/2020 and renal insufficiency who presents today for follow up of HTN and heart failure.\par \par PLAN:\par 1. She is s/p CABG two months ago. She has been feeling well since the surgery. She was in sinus rhythm on her ECG that was performed today.\par 2. She will continue on the Eliquis 2.5 mg twice daily for her DVT as prescribed by Vascular and she will continue to follow up with [facility]iology. \par 3. For her lower extremity edema she will restart on Bumex 0.5mg daily for one week. She will have a potassium and creatinine checked with you today.  I have advised the daughter that she can give her the 0.5 mg of Bumex as needed if her weight goes up by more than 2 pounds. She states that she has had multiple LE dopplers that were negative for DVT. \par 4. Her blood pressure is fine today, thus she will continue to watch her diet and will also continue on Metoprolol  mg daily and Amlodipine 5 mg daily.\par 5. Her most recent LDL was very good with mildly elevated triglycerides. She will continue on Lipitor 40 mg daily along with a low cholesterol diet. \par 6. She will follow up with me in 1 month or sooner if she is symptomatic.

## 2022-01-01 NOTE — PHYSICAL EXAM
[General Appearance - Well Developed] : well developed [Normal Appearance] : normal appearance [General Appearance - Well Nourished] : well nourished [No Deformities] : no deformities [Normal Conjunctiva] : the conjunctiva exhibited no abnormalities [Normal Jugular Venous A Waves Present] : normal jugular venous A waves present [Normal Jugular Venous V Waves Present] : normal jugular venous V waves present [Respiration, Rhythm And Depth] : normal respiratory rhythm and effort [Auscultation Breath Sounds / Voice Sounds] : lungs were clear to auscultation bilaterally [Exaggerated Use Of Accessory Muscles For Inspiration] : no accessory muscle use [Abdomen Soft] : soft [Abdomen Tenderness] : non-tender [Nail Clubbing] : no clubbing of the fingernails [Cyanosis, Localized] : no localized cyanosis [Petechial Hemorrhages (___cm)] : no petechial hemorrhages [Skin Color & Pigmentation] : normal skin color and pigmentation [] : no rash [Oriented To Time, Place, And Person] : oriented to person, place, and time [Affect] : the affect was normal [Mood] : the mood was normal [No Anxiety] : not feeling anxious [Normal Rate] : normal [Rhythm Regular] : regular [Normal S1] : normal S1 [Normal S2] : normal S2 [II] : a grade 2 [___+] : [unfilled]U+ pretibial pitting edema on the right [Right Carotid Bruit] : no bruit heard over the right carotid [Left Carotid Bruit] : no bruit heard over the left carotid [Bruit] : no bruit heard [Bowel Sounds] : normal bowel sounds [FreeTextEntry1] : Her gait is slow secondary to joint pain. [No Skin Ulcers] : no skin ulcer

## 2022-01-07 ENCOUNTER — INPATIENT (INPATIENT)
Facility: HOSPITAL | Age: 82
LOS: 5 days | Discharge: HOME HEALTH | DRG: 871 | End: 2022-01-13
Attending: HOSPITALIST | Admitting: STUDENT IN AN ORGANIZED HEALTH CARE EDUCATION/TRAINING PROGRAM
Payer: MEDICARE

## 2022-01-07 VITALS
RESPIRATION RATE: 18 BRPM | WEIGHT: 190.04 LBS | DIASTOLIC BLOOD PRESSURE: 61 MMHG | OXYGEN SATURATION: 93 % | HEIGHT: 61 IN | HEART RATE: 84 BPM | SYSTOLIC BLOOD PRESSURE: 111 MMHG | TEMPERATURE: 100 F

## 2022-01-07 DIAGNOSIS — Z90.49 ACQUIRED ABSENCE OF OTHER SPECIFIED PARTS OF DIGESTIVE TRACT: Chronic | ICD-10-CM

## 2022-01-07 DIAGNOSIS — Z93.2 ILEOSTOMY STATUS: Chronic | ICD-10-CM

## 2022-01-07 DIAGNOSIS — Z98.89 OTHER SPECIFIED POSTPROCEDURAL STATES: Chronic | ICD-10-CM

## 2022-01-07 DIAGNOSIS — Z96.651 PRESENCE OF RIGHT ARTIFICIAL KNEE JOINT: Chronic | ICD-10-CM

## 2022-01-07 DIAGNOSIS — Z90.11 ACQUIRED ABSENCE OF RIGHT BREAST AND NIPPLE: Chronic | ICD-10-CM

## 2022-01-07 LAB
ALBUMIN SERPL ELPH-MCNC: 3.8 G/DL — SIGNIFICANT CHANGE UP (ref 3.3–5)
ALP SERPL-CCNC: 98 U/L — SIGNIFICANT CHANGE UP (ref 40–120)
ALT FLD-CCNC: 8 U/L — LOW (ref 10–45)
ANION GAP SERPL CALC-SCNC: 16 MMOL/L — SIGNIFICANT CHANGE UP (ref 5–17)
APTT BLD: 27.3 SEC — LOW (ref 27.5–35.5)
AST SERPL-CCNC: 17 U/L — SIGNIFICANT CHANGE UP (ref 10–40)
BASOPHILS # BLD AUTO: 0.04 K/UL — SIGNIFICANT CHANGE UP (ref 0–0.2)
BASOPHILS NFR BLD AUTO: 0.2 % — SIGNIFICANT CHANGE UP (ref 0–2)
BILIRUB SERPL-MCNC: 0.4 MG/DL — SIGNIFICANT CHANGE UP (ref 0.2–1.2)
BUN SERPL-MCNC: 38 MG/DL — HIGH (ref 7–23)
CALCIUM SERPL-MCNC: 9.2 MG/DL — SIGNIFICANT CHANGE UP (ref 8.4–10.5)
CHLORIDE SERPL-SCNC: 96 MMOL/L — SIGNIFICANT CHANGE UP (ref 96–108)
CO2 SERPL-SCNC: 21 MMOL/L — LOW (ref 22–31)
CREAT SERPL-MCNC: 1.67 MG/DL — HIGH (ref 0.5–1.3)
EOSINOPHIL # BLD AUTO: 0.08 K/UL — SIGNIFICANT CHANGE UP (ref 0–0.5)
EOSINOPHIL NFR BLD AUTO: 0.4 % — SIGNIFICANT CHANGE UP (ref 0–6)
GLUCOSE SERPL-MCNC: 218 MG/DL — HIGH (ref 70–99)
HCT VFR BLD CALC: 36.6 % — SIGNIFICANT CHANGE UP (ref 34.5–45)
HGB BLD-MCNC: 10.7 G/DL — LOW (ref 11.5–15.5)
IMM GRANULOCYTES NFR BLD AUTO: 0.5 % — SIGNIFICANT CHANGE UP (ref 0–1.5)
INR BLD: 1.28 RATIO — HIGH (ref 0.88–1.16)
LYMPHOCYTES # BLD AUTO: 1.15 K/UL — SIGNIFICANT CHANGE UP (ref 1–3.3)
LYMPHOCYTES # BLD AUTO: 6.1 % — LOW (ref 13–44)
MAGNESIUM SERPL-MCNC: 2 MG/DL — SIGNIFICANT CHANGE UP (ref 1.6–2.6)
MCHC RBC-ENTMCNC: 26.5 PG — LOW (ref 27–34)
MCHC RBC-ENTMCNC: 29.2 GM/DL — LOW (ref 32–36)
MCV RBC AUTO: 90.6 FL — SIGNIFICANT CHANGE UP (ref 80–100)
MONOCYTES # BLD AUTO: 0.71 K/UL — SIGNIFICANT CHANGE UP (ref 0–0.9)
MONOCYTES NFR BLD AUTO: 3.8 % — SIGNIFICANT CHANGE UP (ref 2–14)
NEUTROPHILS # BLD AUTO: 16.62 K/UL — HIGH (ref 1.8–7.4)
NEUTROPHILS NFR BLD AUTO: 89 % — HIGH (ref 43–77)
NRBC # BLD: 0 /100 WBCS — SIGNIFICANT CHANGE UP (ref 0–0)
NT-PROBNP SERPL-SCNC: 2158 PG/ML — HIGH (ref 0–300)
PLATELET # BLD AUTO: 260 K/UL — SIGNIFICANT CHANGE UP (ref 150–400)
POTASSIUM SERPL-MCNC: 5.1 MMOL/L — SIGNIFICANT CHANGE UP (ref 3.5–5.3)
POTASSIUM SERPL-SCNC: 5.1 MMOL/L — SIGNIFICANT CHANGE UP (ref 3.5–5.3)
PROCALCITONIN SERPL-MCNC: 2.12 NG/ML — HIGH (ref 0.02–0.1)
PROT SERPL-MCNC: 7.4 G/DL — SIGNIFICANT CHANGE UP (ref 6–8.3)
PROTHROM AB SERPL-ACNC: 15.2 SEC — HIGH (ref 10.6–13.6)
RBC # BLD: 4.04 M/UL — SIGNIFICANT CHANGE UP (ref 3.8–5.2)
RBC # FLD: 17.4 % — HIGH (ref 10.3–14.5)
SARS-COV-2 RNA SPEC QL NAA+PROBE: SIGNIFICANT CHANGE UP
SODIUM SERPL-SCNC: 133 MMOL/L — LOW (ref 135–145)
TROPONIN T, HIGH SENSITIVITY RESULT: 30 NG/L — SIGNIFICANT CHANGE UP (ref 0–51)
TROPONIN T, HIGH SENSITIVITY RESULT: 32 NG/L — SIGNIFICANT CHANGE UP (ref 0–51)
WBC # BLD: 18.7 K/UL — HIGH (ref 3.8–10.5)
WBC # FLD AUTO: 18.7 K/UL — HIGH (ref 3.8–10.5)

## 2022-01-07 PROCEDURE — 93010 ELECTROCARDIOGRAM REPORT: CPT

## 2022-01-07 PROCEDURE — 99285 EMERGENCY DEPT VISIT HI MDM: CPT | Mod: GC

## 2022-01-07 PROCEDURE — 73030 X-RAY EXAM OF SHOULDER: CPT | Mod: 26,LT

## 2022-01-07 PROCEDURE — 71045 X-RAY EXAM CHEST 1 VIEW: CPT | Mod: 26

## 2022-01-07 RX ORDER — ACETAMINOPHEN 500 MG
975 TABLET ORAL ONCE
Refills: 0 | Status: COMPLETED | OUTPATIENT
Start: 2022-01-07 | End: 2022-01-07

## 2022-01-07 RX ORDER — CEFAZOLIN SODIUM 1 G
1000 VIAL (EA) INJECTION ONCE
Refills: 0 | Status: COMPLETED | OUTPATIENT
Start: 2022-01-07 | End: 2022-01-07

## 2022-01-07 RX ORDER — LIDOCAINE 4 G/100G
1 CREAM TOPICAL ONCE
Refills: 0 | Status: COMPLETED | OUTPATIENT
Start: 2022-01-07 | End: 2022-01-07

## 2022-01-07 RX ADMIN — LIDOCAINE 1 PATCH: 4 CREAM TOPICAL at 18:15

## 2022-01-07 RX ADMIN — Medication 975 MILLIGRAM(S): at 18:15

## 2022-01-07 NOTE — ED PROVIDER NOTE - NSCAREINITIATED _GEN_ER
"Requested Prescriptions   Pending Prescriptions Disp Refills     levothyroxine (SYNTHROID/LEVOTHROID) 88 MCG tablet [Pharmacy Med Name: LEVOTHYROXINE 0.088MG (88MCG) TAB] 90 tablet 3     Sig: TAKE 1 TABLET(88 MCG) BY MOUTH DAILY       Thyroid Protocol Failed - 7/25/2021 10:15 AM        Failed - Recent (12 mo) or future (30 days) visit within the authorizing provider's specialty     Patient has had an office visit with the authorizing provider or a provider within the authorizing providers department within the previous 12 mos or has a future within next 30 days. See \"Patient Info\" tab in inbasket, or \"Choose Columns\" in Meds & Orders section of the refill encounter.              Failed - Normal TSH on file in past 12 months     Recent Labs   Lab Test 07/14/20  1442   TSH 1.32              Passed - Patient is 12 years or older        Passed - Medication is active on med list        Passed - No active pregnancy on record     If patient is pregnant or has had a positive pregnancy test, please check TSH.          Passed - No positive pregnancy test in past 12 months     If patient is pregnant or has had a positive pregnancy test, please check TSH.             Last Written Prescription Date:  7/14/20  Last Fill Quantity: 90,  # refills: 3   Last office visit: 7/14/2020 with prescribing provider:  [facility]   Future Office Visit:  none          " Jose Blanchard(Resident)

## 2022-01-07 NOTE — ED PROVIDER NOTE - OBJECTIVE STATEMENT
82yo F hx of breast CA, DM, HTN, HLD, CHF, chronic RUE lymphedema comes to ED for L shoulder pain. L shoulder pain worsening over last several weeks, pain with movement but not w/ laying still, no trauma. Per pt's daughter, also concerned about her gaining weight, swelling of her face and arm worse, bumex 1mg normally daily but last week has been taking 2. Also per daughter, has previous cellulitis of her lymphademic arm. Denies fever, cp, sob, abdominal pain.    PCP/cards: Dr. Diamond (physician partner)

## 2022-01-07 NOTE — ED PROVIDER NOTE - PHYSICAL EXAMINATION
General: NAD  HEENT: NCAT, PERRL  Cardiac: RRR, no murmurs, 2+ peripheral pulses  Chest: CTA  Abdomen: soft, non-distended, bowel sounds present, no ttp, no rebound or guarding  Extremities: no peripheral edema, calf tenderness, or leg size discrepancies +RUE w/ lymphadema, warm to touch at the distal wrist +L shoulder w/ pain on active abduction, passive rom consistent w/ arthritis.   Skin: no rashes  Neuro: AAOx3, motor and sensory grossly intact  Psych: mood and affect appropriate

## 2022-01-07 NOTE — ED ADULT NURSE NOTE - NSFALLRSKHARMRISK_ED_ALL_ED
Chronic complaints of generalized pain.  This is stable common finding for people with Alz dis.  Zaid has had an extensive work up that was all negative.       Alzheimer's disease  She did not tolerate the Namenda. It was stopped.   There has been some notable decline over the past year.  We talked about the progression of this dis.  I have no new recommendations.  I have filled out [place] papers for sister Marcelle.    Weight loss  I have suggested carnation Essentials to help maintain her weight.  I would suggest giving at least one or two daily for this problem.  Along with usual meals.       CONSTIPATION  She takes Colace in the evening time daily.  she uses the Mirilax on an as needed basis at this time.  Stable at this time.     PROBLEMS SWALLOWING  She had a swallow study with minor concerns in 2010. She attempted a repeat swallow study in 2013 and was not able to do the test. Family cuts her food up small and she avoids problematic foods. she is maybe having a little more problems. Family is doing a good job in trying to modify her diet to help with this problem.      DOWN SYNDROME  I recommend an annual evaluation. Overall there has been some decline secondary to the Alz dis. She has PCP closer to home that manages most of her healthcare issues on a regular basis.     She has labs done thru her PCP and hematologist regularly.       Immunizations  I would recommend a pneumonia vaccine if she has not had one yet. I would recommend the flu shot annually in the fall. If she has not had a Tdap in the past 10 years then I would recommend this vaccine as well.      Neck xray  We do not have record of recent neck xray but examination today did not show any concerning findings. In light of her struggles with medical testing I would say this is not a necessary test at this time.     EARWAX  She declined this today. Not significant accumulation though.     Macrocytosis / Neutropenia  She follows up with hematologist for this  finding.  Overall has been stable.    Return in about 1 year (around 4/29/2020) for annual evaluation.          no

## 2022-01-07 NOTE — ED PROVIDER NOTE - PROGRESS NOTE DETAILS
ISAMAR Blanchard (PGY-2) - labs consistent w/ CHF, leukocytosis w/ neutrophil predominance, will obtain procalc. Luciano Brice PGY2: Pt rejected for obs in CDU. Will admit for further management and IV abx for RUE cellulitis.

## 2022-01-07 NOTE — ED ADULT NURSE NOTE - OBJECTIVE STATEMENT
Pt presented to ed with c/o LT arm pain that started 2-3 wks ago with worsening of symptoms today. Pt denies any fall, trauma, nausea, vomiting, fever, sob, chest pain, headache, dizziness. Will continue to monitor.

## 2022-01-07 NOTE — ED ADULT TRIAGE NOTE - HEIGHT IN FEET
SUBJECTIVE:   Milagro Wallace is a 75 year old female who presents to clinic today for the following health issues:      Chief Complaint   Patient presents with     Recheck Medication     follow up with provider aftr, Ct & MRI of lower back,  than has been PT and pain clinic once and was told to follow up with you         Diabetes Follow-up      Patient is checking blood sugars: once daily.    Diabetic concerns: None - BS was quite high the morning after her steroid injection     Symptoms of hypoglycemia (low blood sugar): none     Paresthesias (numbness or burning in feet) or sores: No     Date of last diabetic eye exam:      Diabetes Management Resources    Hyperlipidemia Follow-Up      Rate your low fat/cholesterol diet?: not monitoring fat    Taking statin?  Yes, no muscle aches from statin    Other lipid medications/supplements?:  none    Hypertension Follow-up      Outpatient blood pressures are not being checked.    Low Salt Diet: no added salt    BP Readings from Last 2 Encounters:   11/16/18 128/62   10/19/18 163/57     Hemoglobin A1C (%)   Date Value   06/06/2018 7.3 (H)   10/23/2017 7.2 (H)     LDL Cholesterol Calculated (mg/dL)   Date Value   10/30/2018 49   10/23/2017 39     Back Pain Follow Up      Description:   Location of pain:  left  Character of pain: sharp and stabbing  Pain radiation: radiates into the left buttocks  Since last visit, pain is:  unchanged  New numbness or weakness in legs, not attributed to pain:  no     Intensity: severe    History:   Pain interferes with job: Not applicable  Therapies tried without relief: acetaminophen (Tylenol), steroid injection, physical therapy  Therapies tried with relief: ultram         /62 (Cuff Size: Adult Regular)  Pulse 60  Temp 97.4  F (36.3  C) (Tympanic)  Resp 20  Wt 191 lb 6.4 oz (86.8 kg)  LMP 09/25/1979 (Approximate)  SpO2 95%  BMI 34.45 kg/m2  EXAM: GENERAL APPEARANCE: Alert, no acute distress  RESP: lungs clear to auscultation  "  CV: normal rate, regular rhythm, no murmur or gallop  ABDOMEN: soft, no organomegaly, masses or tenderness  MS: edema is minimal, walking with a cane. Full spine exam not done today  PSYCH: mentation appears normal., affect and mood normal    ASSESSMENT/PLAN:      ICD-10-CM    1. Spinal stenosis of lumbar region at multiple levels M48.061 ORTHO  REFERRAL     gabapentin (NEURONTIN) 300 MG capsule   2. DDD (degenerative disc disease), lumbar M51.36 ORTHO  REFERRAL     gabapentin (NEURONTIN) 300 MG capsule     traMADol (ULTRAM) 50 MG tablet   3. Hyperlipidemia LDL goal <100 E78.5 simvastatin (ZOCOR) 20 MG tablet   4. HTN, goal below 140/90 I10    5. CKD (chronic kidney disease) stage 3, GFR 30-59 ml/min (H) N18.3    6. Closed compression fracture of first lumbar vertebra, sequela S32.010S traMADol (ULTRAM) 50 MG tablet   7. Type 2 diabetes mellitus with stage 3 chronic kidney disease, without long-term current use of insulin (H) E11.22 Hemoglobin A1c    N18.3 Basic metabolic panel     Recommend she see spine surgeon for another opinion on this.  CSI did not help her.  She feels like she's unable to \"go on\" with pain like it is.  Has been using the tramadol very sparingly, but can't sleep due to the pain.    Will try adding gabapentin, given #60 tramadol refill but would expect this would last quite some time.     Continue PT.  See me back in a month so we can futher adjust the gabapentin and assess it's efficacy.     Due soon for HgbA1c and she's leaving for [place] in Dec/Jan.   Also due for microalbumin.   Labs done recently didn't include all that she was due for.     Morena Mcintosh M.D.      Patient Instructions   Start Gabapentin (neurontin) 300 mg at night for three nights, then 300 mg twice daily for three days, then 300 mg three times daily  See me in 1 month to recheck.    See Dr. Wilson - spine surgeon          Thank you for choosing [facility].  You may be receiving a survey in the " mail from [employer] regarding your visit today.  Please take a few minutes to complete and return the survey to let us know how we are doing.      Our Clinic hours are:  Mondays    7:20 am - 7 pm  Tues -  Fri  7:20 am - 5 pm    Clinic Phone: [phone]    The clinic lab opens at 7:30 am Mon - Fri and appointments are required.    [facility]. [phone]  Monday  8 am - 7pm  Tues - Fri 8 am - 5:30 pm                  5

## 2022-01-07 NOTE — ED PROVIDER NOTE - ATTENDING CONTRIBUTION TO CARE
------------ATTENDING NOTE------------   RHD pt c/o intermittent stabbing pain in L anterior shoulder, worse w/ moving around, point tenderness to area, likely bursitis, no trauma, no redness/warmth, nvi w/ bcr distally, pt also c/o slight skin darkening to entire LUE from baseline lymphedema related to remote breast cancer/surgery, lower suspicion for infectious changes (no obvious cellulitis or warmth and no fevers), has CHF and feels at baseline SOB, awaiting labs/imaging and close reassessments -->  - Eulogio Boothe MD   --------------------------------------------- ------------ATTENDING NOTE------------   RHD pt c/o intermittent stabbing pain in L anterior shoulder, worse w/ moving around, point tenderness to area, likely bursitis, no trauma, no redness/warmth, nvi w/ bcr distally, pt also c/o slight skin darkening to entire LUE from baseline lymphedema related to remote breast cancer/surgery, lower suspicion for infectious changes (no obvious cellulitis or warmth and no fevers), has CHF and feels at baseline SOB, awaiting labs/imaging and close reassessments --> will empiric antibiotics (for leukocytosis/PCT), awaiting UA, d/w PCP and ? CDU for monitoring all pending at ED sign out.  - Eulogio Boothe MD   ---------------------------------------------

## 2022-01-07 NOTE — ED ADULT TRIAGE NOTE - STATUS:
The phone number to radiology is:  [phone]. Please call Radiology department to schedule your CT. Further recommendations to follow.   Continue to take Miralax daily and add docusate daily. This can be titrated as needed.   You can take Levsin as needed, as previously prescribed, for abdominal cramping.     
[employer]

## 2022-01-08 DIAGNOSIS — R09.89 OTHER SPECIFIED SYMPTOMS AND SIGNS INVOLVING THE CIRCULATORY AND RESPIRATORY SYSTEMS: ICD-10-CM

## 2022-01-08 DIAGNOSIS — I50.9 HEART FAILURE, UNSPECIFIED: ICD-10-CM

## 2022-01-08 DIAGNOSIS — N18.30 CHRONIC KIDNEY DISEASE, STAGE 3 UNSPECIFIED: ICD-10-CM

## 2022-01-08 DIAGNOSIS — E11.9 TYPE 2 DIABETES MELLITUS WITHOUT COMPLICATIONS: ICD-10-CM

## 2022-01-08 DIAGNOSIS — M75.52 BURSITIS OF LEFT SHOULDER: ICD-10-CM

## 2022-01-08 DIAGNOSIS — L03.90 CELLULITIS, UNSPECIFIED: ICD-10-CM

## 2022-01-08 DIAGNOSIS — M25.512 PAIN IN LEFT SHOULDER: ICD-10-CM

## 2022-01-08 LAB
ANION GAP SERPL CALC-SCNC: 13 MMOL/L — SIGNIFICANT CHANGE UP (ref 5–17)
APPEARANCE UR: CLEAR — SIGNIFICANT CHANGE UP
BACTERIA # UR AUTO: NEGATIVE — SIGNIFICANT CHANGE UP
BILIRUB UR-MCNC: NEGATIVE — SIGNIFICANT CHANGE UP
BUN SERPL-MCNC: 32 MG/DL — HIGH (ref 7–23)
CALCIUM SERPL-MCNC: 8.9 MG/DL — SIGNIFICANT CHANGE UP (ref 8.4–10.5)
CHLORIDE SERPL-SCNC: 99 MMOL/L — SIGNIFICANT CHANGE UP (ref 96–108)
CO2 SERPL-SCNC: 26 MMOL/L — SIGNIFICANT CHANGE UP (ref 22–31)
COLOR SPEC: SIGNIFICANT CHANGE UP
CREAT SERPL-MCNC: 1.56 MG/DL — HIGH (ref 0.5–1.3)
DIFF PNL FLD: NEGATIVE — SIGNIFICANT CHANGE UP
EPI CELLS # UR: 1 /HPF — SIGNIFICANT CHANGE UP
GAS PNL BLDV: SIGNIFICANT CHANGE UP
GLUCOSE SERPL-MCNC: 207 MG/DL — HIGH (ref 70–99)
GLUCOSE UR QL: NEGATIVE — SIGNIFICANT CHANGE UP
HCT VFR BLD CALC: 35.8 % — SIGNIFICANT CHANGE UP (ref 34.5–45)
HGB BLD-MCNC: 10.4 G/DL — LOW (ref 11.5–15.5)
KETONES UR-MCNC: NEGATIVE — SIGNIFICANT CHANGE UP
LEUKOCYTE ESTERASE UR-ACNC: NEGATIVE — SIGNIFICANT CHANGE UP
MCHC RBC-ENTMCNC: 26.7 PG — LOW (ref 27–34)
MCHC RBC-ENTMCNC: 29.1 GM/DL — LOW (ref 32–36)
MCV RBC AUTO: 91.8 FL — SIGNIFICANT CHANGE UP (ref 80–100)
NITRITE UR-MCNC: NEGATIVE — SIGNIFICANT CHANGE UP
NRBC # BLD: 0 /100 WBCS — SIGNIFICANT CHANGE UP (ref 0–0)
PH UR: 6.5 — SIGNIFICANT CHANGE UP (ref 5–8)
PLATELET # BLD AUTO: 236 K/UL — SIGNIFICANT CHANGE UP (ref 150–400)
POTASSIUM SERPL-MCNC: 3.9 MMOL/L — SIGNIFICANT CHANGE UP (ref 3.5–5.3)
POTASSIUM SERPL-SCNC: 3.9 MMOL/L — SIGNIFICANT CHANGE UP (ref 3.5–5.3)
PROT UR-MCNC: ABNORMAL
RBC # BLD: 3.9 M/UL — SIGNIFICANT CHANGE UP (ref 3.8–5.2)
RBC # FLD: 17.6 % — HIGH (ref 10.3–14.5)
RBC CASTS # UR COMP ASSIST: 1 /HPF — SIGNIFICANT CHANGE UP (ref 0–4)
SODIUM SERPL-SCNC: 138 MMOL/L — SIGNIFICANT CHANGE UP (ref 135–145)
SP GR SPEC: 1.02 — SIGNIFICANT CHANGE UP (ref 1.01–1.02)
UROBILINOGEN FLD QL: NEGATIVE — SIGNIFICANT CHANGE UP
WBC # BLD: 11.68 K/UL — HIGH (ref 3.8–10.5)
WBC # FLD AUTO: 11.68 K/UL — HIGH (ref 3.8–10.5)
WBC UR QL: 2 /HPF — SIGNIFICANT CHANGE UP (ref 0–5)

## 2022-01-08 PROCEDURE — 99223 1ST HOSP IP/OBS HIGH 75: CPT

## 2022-01-08 PROCEDURE — 93971 EXTREMITY STUDY: CPT | Mod: 26,RT

## 2022-01-08 RX ORDER — ACETAMINOPHEN 500 MG
650 TABLET ORAL EVERY 6 HOURS
Refills: 0 | Status: DISCONTINUED | OUTPATIENT
Start: 2022-01-08 | End: 2022-01-13

## 2022-01-08 RX ORDER — CEFAZOLIN SODIUM 1 G
2000 VIAL (EA) INJECTION EVERY 12 HOURS
Refills: 0 | Status: DISCONTINUED | OUTPATIENT
Start: 2022-01-08 | End: 2022-01-13

## 2022-01-08 RX ORDER — GLUCAGON INJECTION, SOLUTION 0.5 MG/.1ML
1 INJECTION, SOLUTION SUBCUTANEOUS ONCE
Refills: 0 | Status: DISCONTINUED | OUTPATIENT
Start: 2022-01-08 | End: 2022-01-13

## 2022-01-08 RX ORDER — DEXTROSE 50 % IN WATER 50 %
12.5 SYRINGE (ML) INTRAVENOUS ONCE
Refills: 0 | Status: DISCONTINUED | OUTPATIENT
Start: 2022-01-08 | End: 2022-01-13

## 2022-01-08 RX ORDER — SODIUM CHLORIDE 9 MG/ML
1000 INJECTION, SOLUTION INTRAVENOUS
Refills: 0 | Status: DISCONTINUED | OUTPATIENT
Start: 2022-01-08 | End: 2022-01-13

## 2022-01-08 RX ORDER — DEXTROSE 50 % IN WATER 50 %
15 SYRINGE (ML) INTRAVENOUS ONCE
Refills: 0 | Status: DISCONTINUED | OUTPATIENT
Start: 2022-01-08 | End: 2022-01-13

## 2022-01-08 RX ORDER — TRAMADOL HYDROCHLORIDE 50 MG/1
25 TABLET ORAL THREE TIMES A DAY
Refills: 0 | Status: DISCONTINUED | OUTPATIENT
Start: 2022-01-08 | End: 2022-01-13

## 2022-01-08 RX ORDER — FAMOTIDINE 10 MG/ML
20 INJECTION INTRAVENOUS DAILY
Refills: 0 | Status: DISCONTINUED | OUTPATIENT
Start: 2022-01-08 | End: 2022-01-13

## 2022-01-08 RX ORDER — BUMETANIDE 0.25 MG/ML
1 INJECTION INTRAMUSCULAR; INTRAVENOUS EVERY 8 HOURS
Refills: 0 | Status: DISCONTINUED | OUTPATIENT
Start: 2022-01-08 | End: 2022-01-11

## 2022-01-08 RX ORDER — METOPROLOL TARTRATE 50 MG
150 TABLET ORAL DAILY
Refills: 0 | Status: DISCONTINUED | OUTPATIENT
Start: 2022-01-08 | End: 2022-01-13

## 2022-01-08 RX ORDER — ATORVASTATIN CALCIUM 80 MG/1
40 TABLET, FILM COATED ORAL AT BEDTIME
Refills: 0 | Status: DISCONTINUED | OUTPATIENT
Start: 2022-01-08 | End: 2022-01-13

## 2022-01-08 RX ORDER — LANOLIN ALCOHOL/MO/W.PET/CERES
3 CREAM (GRAM) TOPICAL AT BEDTIME
Refills: 0 | Status: DISCONTINUED | OUTPATIENT
Start: 2022-01-08 | End: 2022-01-13

## 2022-01-08 RX ORDER — INSULIN LISPRO 100/ML
VIAL (ML) SUBCUTANEOUS AT BEDTIME
Refills: 0 | Status: DISCONTINUED | OUTPATIENT
Start: 2022-01-08 | End: 2022-01-13

## 2022-01-08 RX ORDER — APIXABAN 2.5 MG/1
2.5 TABLET, FILM COATED ORAL EVERY 12 HOURS
Refills: 0 | Status: DISCONTINUED | OUTPATIENT
Start: 2022-01-08 | End: 2022-01-13

## 2022-01-08 RX ORDER — DEXTROSE 50 % IN WATER 50 %
25 SYRINGE (ML) INTRAVENOUS ONCE
Refills: 0 | Status: DISCONTINUED | OUTPATIENT
Start: 2022-01-08 | End: 2022-01-13

## 2022-01-08 RX ORDER — SPIRONOLACTONE 25 MG/1
12.5 TABLET, FILM COATED ORAL DAILY
Refills: 0 | Status: DISCONTINUED | OUTPATIENT
Start: 2022-01-08 | End: 2022-01-13

## 2022-01-08 RX ORDER — GABAPENTIN 400 MG/1
300 CAPSULE ORAL
Refills: 0 | Status: DISCONTINUED | OUTPATIENT
Start: 2022-01-08 | End: 2022-01-13

## 2022-01-08 RX ORDER — OXYCODONE HYDROCHLORIDE 5 MG/1
1 TABLET ORAL
Qty: 0 | Refills: 0 | DISCHARGE

## 2022-01-08 RX ORDER — SENNA PLUS 8.6 MG/1
2 TABLET ORAL AT BEDTIME
Refills: 0 | Status: DISCONTINUED | OUTPATIENT
Start: 2022-01-08 | End: 2022-01-13

## 2022-01-08 RX ORDER — AMLODIPINE BESYLATE 2.5 MG/1
5 TABLET ORAL DAILY
Refills: 0 | Status: DISCONTINUED | OUTPATIENT
Start: 2022-01-08 | End: 2022-01-13

## 2022-01-08 RX ORDER — INSULIN LISPRO 100/ML
VIAL (ML) SUBCUTANEOUS
Refills: 0 | Status: DISCONTINUED | OUTPATIENT
Start: 2022-01-08 | End: 2022-01-13

## 2022-01-08 RX ORDER — ONDANSETRON 8 MG/1
4 TABLET, FILM COATED ORAL EVERY 8 HOURS
Refills: 0 | Status: DISCONTINUED | OUTPATIENT
Start: 2022-01-08 | End: 2022-01-13

## 2022-01-08 RX ORDER — ASPIRIN/CALCIUM CARB/MAGNESIUM 324 MG
81 TABLET ORAL DAILY
Refills: 0 | Status: DISCONTINUED | OUTPATIENT
Start: 2022-01-08 | End: 2022-01-13

## 2022-01-08 RX ORDER — POLYETHYLENE GLYCOL 3350 17 G/17G
17 POWDER, FOR SOLUTION ORAL AT BEDTIME
Refills: 0 | Status: DISCONTINUED | OUTPATIENT
Start: 2022-01-08 | End: 2022-01-13

## 2022-01-08 RX ADMIN — Medication 650 MILLIGRAM(S): at 18:44

## 2022-01-08 RX ADMIN — Medication 100 MILLIGRAM(S): at 02:14

## 2022-01-08 RX ADMIN — Medication 975 MILLIGRAM(S): at 19:14

## 2022-01-08 RX ADMIN — Medication 150 MILLIGRAM(S): at 19:03

## 2022-01-08 RX ADMIN — BUMETANIDE 1 MILLIGRAM(S): 0.25 INJECTION INTRAMUSCULAR; INTRAVENOUS at 15:05

## 2022-01-08 RX ADMIN — Medication 81 MILLIGRAM(S): at 18:44

## 2022-01-08 RX ADMIN — Medication 650 MILLIGRAM(S): at 11:59

## 2022-01-08 RX ADMIN — Medication 650 MILLIGRAM(S): at 19:14

## 2022-01-08 RX ADMIN — Medication 100 MILLIGRAM(S): at 15:06

## 2022-01-08 RX ADMIN — APIXABAN 2.5 MILLIGRAM(S): 2.5 TABLET, FILM COATED ORAL at 18:45

## 2022-01-08 RX ADMIN — Medication 650 MILLIGRAM(S): at 09:42

## 2022-01-08 RX ADMIN — GABAPENTIN 300 MILLIGRAM(S): 400 CAPSULE ORAL at 18:44

## 2022-01-08 NOTE — H&P ADULT - NSHPLABSRESULTS_GEN_ALL_CORE
.  LABS:                         10.4   11.68 )-----------( 236      ( 2022 12:07 )             35.8         138  |  99  |  32<H>  ----------------------------<  207<H>  3.9   |  26  |  1.56<H>    Ca    8.9      2022 12:07  Mg     2.0         TPro  7.4  /  Alb  3.8  /  TBili  0.4  /  DBili  x   /  AST  17  /  ALT  8<L>  /  AlkPhos  98  -    PT/INR - ( 2022 19:18 )   PT: 15.2 sec;   INR: 1.28 ratio         PTT - ( 2022 19:18 )  PTT:27.3 sec  Urinalysis Basic - ( 2022 02:17 )    Color: Light Yellow / Appearance: Clear / S.017 / pH: x  Gluc: x / Ketone: Negative  / Bili: Negative / Urobili: Negative   Blood: x / Protein: 30 mg/dL / Nitrite: Negative   Leuk Esterase: Negative / RBC: 1 /hpf / WBC 2 /HPF   Sq Epi: x / Non Sq Epi: 1 /hpf / Bacteria: Negative            RADIOLOGY, EKG & ADDITIONAL TESTS: Reviewed.   CXR personally reviewed: ?small L pleff, otherwise clear lungs  EKG personally reviewed: NSR no ischemic changes

## 2022-01-08 NOTE — H&P ADULT - EXTREMITIES COMMENTS
+RUE lymphedema with well demarcated erythema to above the elbow, mildly tender. +RLE redness distal leg without significant tenderness or edema

## 2022-01-08 NOTE — H&P ADULT - PROBLEM SELECTOR PLAN 1
With reported 20-30lb weight gain despite outpatient increase in home diuretic dose, now noted to be mildly hypoxic requiring 1-2 L NC in ED  - TTE in march 2021 showing grade II diastolic dysfunction, mild/mod TR, severe pHTN- would repeat TTE now  - increase bumex to 1mg iv TID, monitor strict i/o, daily weights  - continue aldactone 12.5mgpo daily  - cont troprol xl 150mg po daily  - check vbg as patient was slightly lethargic this am  - wean o2 as tolerated  - cards consult pending TTE and response to initial diuretics

## 2022-01-08 NOTE — CONSULT NOTE ADULT - SUBJECTIVE AND OBJECTIVE BOX
HPI:  Ms. Batista is an 81 year-old woman with history of multiple medical issues including hypertension, type 2 diabetes mellitus, distant breast cancer and rectal cancer, chronic kidney disease, and congestive heart failure. She is well-known to my partner Dr. Igor Kan. She presented overnight to the [facility] ER with worsening left shoulder pain over the last several weeks. Per daughter, she has also developed swelling of her face and right arm of late, and gaining weight. Her right arm has chronic lymphadenopathy s/p past R mastectomy, and she has had cellulitis of the arm in the past. She was admitted with presumed cellulitis of the right arm; she received a dose of IV Ancef in the ER.      PAST MEDICAL & SURGICAL HISTORY:  HTN  DM2  HLD  CKD3b  Breast CA, right - s/p mastectomy ,IV Chemotherapy  Rectal cancer s/p chemotherapy and  radiation 12 weeks 2015 -3/2015; LAR ; ileostomy+ reversal  CHF (congestive heart failure)  DVT of leg (deep venous thrombosis) -right calf DVT  2019 pt on Eliquis  History of appendectomy   S/P TKR (total knee replacement), right     Allergies  CT scan dye (Hives)  penicillin (Unknown)    SOCIAL HISTORY:  Denies ETOh,Smoking,     FAMILY HISTORY:  Family history of diabetes mellitus - mother, son   Family history of heart attack (Child)    REVIEW OF SYSTEMS:  CONSTITUTIONAL: No weakness, fevers or chills  EYES/ENT: No visual changes;  No vertigo or throat pain; (+)facial swelling  NECK: No pain or stiffness  RESPIRATORY: No cough, wheezing, hemoptysis; No shortness of breath  CARDIOVASCULAR: No chest pain or palpitations; (+)RUE swelling  GASTROINTESTINAL: No abdominal or epigastric pain. No nausea, vomiting, or hematemesis; No diarrhea or constipation. No melena or hematochezia.  GENITOURINARY: No dysuria, frequency or hematuria  MUSCULOSKELETAL: (+)L shoulder pain  NEUROLOGICAL: No numbness or weakness  SKIN: No itching, burning, rashes, or lesions   All other review of systems is negative unless indicated above.    VITAL:  T(C): , Max: 37.7 (22 @ 16:43)  T(F): , Max: 99.8 (22 @ 16:43)  HR: 78 (22 @ 09:27)  BP: 144/80 (22 @ 09:27)  BP(mean): 94 (22 @ 02:21)  RR: 20 (22 @ 09:27)  SpO2: 100% (22 @ 09:27)    PHYSICAL EXAM:  Constitutional: NAD, Alert  HEENT: NCAT, MMM  Neck: Supple, No JVD  Respiratory: CTA-b/l  Cardiovascular: RRR s1s2, no m/r/g  Gastrointestinal: BS+, soft, NT/ND  Extremities: (+)RUE nonpitting edema  Neurological: no focal deficits; strength grossly intact  Back: no CVAT b/l  Skin: No rashes, no nevi    LABS:                        10.7   18.70 )-----------( 260      ( 2022 19:18 )             36.6     Na(133)/K(5.1)/Cl(96)/HCO3(21)/BUN(38)/Cr(1.67)Glu(218)/Ca(9.2)/Mg(2.0)/PO4(--)     @ 19:18    Urinalysis Basic - ( 2022 02:17 )  Color: Light Yellow / Appearance: Clear / S.017 / pH: x  Gluc: x / Ketone: Negative  / Bili: Negative / Urobili: Negative   Blood: x / Protein: 30 mg/dL / Nitrite: Negative   Leuk Esterase: Negative / RBC: 1 /hpf / WBC 2 /HPF   Sq Epi: x / Non Sq Epi: 1 /hpf / Bacteria: Negative    (21) - BUN/creatinine: 28/1.74, K 5.1      IMAGING:  < from: Xray Shoulder 2 Views, Left (22 @ 17:26) >  No acute fracture or dislocation.    < from: Xray Chest 1 View- PORTABLE-Urgent (Xray Chest 1 View- PORTABLE-Urgent .) (22 @ 17:26) >  Clear lungs.      ASSESSMENT:  (1)Renal - nonproteinuric CKD3b - likely due to microvascular disease. At/near baseline GFR  (2)Lytes - grossly acceptable  (3)ID - RUE cellulitis    RECOMMEND:  (1)No objection to IV diuretics  (2)Abx for GFR 30-40ml/min  (3)BMP daily    Thank you for involving [facility] in this patient's care.    With warm regards,    Ignacio Morataya MD   [facility]  Office: [phone]  Cell: [phone]             HPI: Ms. Batista is an 81 year-old woman with history of multiple medical issues including hypertension, type 2 diabetes mellitus, distant breast cancer and rectal cancer, chronic kidney disease, and congestive heart failure. She is well-known to my partner Dr. Igor Kan. She presented overnight to the [facility] ER with worsening left shoulder pain over the last several weeks. Per daughter, she has also developed swelling of her face and right arm of late, and gaining weight. Her right arm has chronic lymphadenopathy s/p past R mastectomy, and she has had cellulitis of the arm in the past. She was admitted with presumed cellulitis of the right arm; she received a dose of IV Ancef in the ER.      PAST MEDICAL & SURGICAL HISTORY:  HTN  DM2  HLD  CKD3b  Breast CA, right - s/p mastectomy ,IV Chemotherapy  Rectal cancer s/p chemotherapy and  radiation 12 weeks 2015 -3/2015; LAR ; ileostomy+ reversal  CHF (congestive heart failure)  DVT of leg (deep venous thrombosis) -right calf DVT  2019 pt on Eliquis  History of appendectomy   S/P TKR (total knee replacement), right     Allergies  CT scan dye (Hives)  penicillin (Unknown)    SOCIAL HISTORY:  Denies ETOh,Smoking,     FAMILY HISTORY:  Family history of diabetes mellitus - mother, son   Family history of heart attack (Child)    REVIEW OF SYSTEMS:  CONSTITUTIONAL: No weakness, fevers or chills  EYES/ENT: No visual changes;  No vertigo or throat pain; (+)facial swelling  NECK: No pain or stiffness  RESPIRATORY: No cough, wheezing, hemoptysis; No shortness of breath  CARDIOVASCULAR: No chest pain or palpitations; (+)RUE swelling  GASTROINTESTINAL: No abdominal or epigastric pain. No nausea, vomiting, or hematemesis; No diarrhea or constipation. No melena or hematochezia.  GENITOURINARY: No dysuria, frequency or hematuria  MUSCULOSKELETAL: (+)L shoulder pain  NEUROLOGICAL: No numbness or weakness  SKIN: No itching, burning, rashes, or lesions   All other review of systems is negative unless indicated above.    VITAL:  T(C): , Max: 37.7 (22 @ 16:43)  T(F): , Max: 99.8 (22 @ 16:43)  HR: 78 (22 @ 09:27)  BP: 144/80 (22 @ 09:27)  BP(mean): 94 (22 @ 02:21)  RR: 20 (22 @ 09:27)  SpO2: 100% (22 @ 09:27)    PHYSICAL EXAM:  Constitutional: NAD, Alert  HEENT: NCAT, MMM  Neck: Supple, No JVD  Respiratory: CTA-b/l  Cardiovascular: RRR s1s2, no m/r/g  Gastrointestinal: BS+, soft, NT/ND  : (+)primafit  Extremities: (+)RUE nonpitting edema  Neurological: no focal deficits  Back: no CVAT b/l  Skin: No rashes, no nevi    LABS:                        10.7   18.70 )-----------( 260      ( 2022 19:18 )             36.6     Na(133)/K(5.1)/Cl(96)/HCO3(21)/BUN(38)/Cr(1.67)Glu(218)/Ca(9.2)/Mg(2.0)/PO4(--)     @ 19:18    Urinalysis Basic - ( 2022 02:17 )  Color: Light Yellow / Appearance: Clear / S.017 / pH: x  Gluc: x / Ketone: Negative  / Bili: Negative / Urobili: Negative   Blood: x / Protein: 30 mg/dL / Nitrite: Negative   Leuk Esterase: Negative / RBC: 1 /hpf / WBC 2 /HPF   Sq Epi: x / Non Sq Epi: 1 /hpf / Bacteria: Negative    (21) - BUN/creatinine: 28/1.74, K 5.1      IMAGING:  < from: Xray Shoulder 2 Views, Left (22 @ 17:26) >  No acute fracture or dislocation.    < from: Xray Chest 1 View- PORTABLE-Urgent (Xray Chest 1 View- PORTABLE-Urgent .) (22 @ 17:26) >  Clear lungs.      ASSESSMENT:  (1)Renal - nonproteinuric CKD3b - likely due to microvascular disease. At/near baseline GFR  (2)Lytes - grossly acceptable  (3)ID - RUE cellulitis    RECOMMEND:  (1)No objection to IV diuretics  (2)Abx for GFR 30-40ml/min  (3)BMP daily    Thank you for involving [facility] in this patient's care.    With warm regards,    Ignacio Morataya MD   [facility]  Office: [phone]  Cell: [phone]

## 2022-01-08 NOTE — H&P ADULT - ASSESSMENT
82yo F hx of breast CA, DM, HTN, HLD, CHF, chronic RUE lymphedema comes to ED for progressive L shoulder pain x weeks and concern for fluid overload due to ADHF, RUE redness concerning for cellulitis vs. DVT, RLE redness concerning for cellulitis vs. DVT

## 2022-01-08 NOTE — H&P ADULT - PROBLEM SELECTOR PLAN 2
RUE and RLE redness and white count on presentation concerning for nonpurulent cellulitis  - continue renally dosed ancef 2gm iv q12 hours, monitor response and transition to po abx if improves in the next 48 hours  - would check RUE and RLE doppler to r/o DVT given prior hx of VTE although lower likelihood as patient is on eliquis

## 2022-01-08 NOTE — H&P ADULT - HISTORY OF PRESENT ILLNESS
82yo F hx of breast CA, DM, HTN, HLD, CHF, chronic RUE lymphedema comes to ED for progressive L shoulder pain x weeks, worse with movement, better with rest, minimal relief with OTC pain medications. No trauma or injury noted. Per daughter, has also noted patient has become more swollen and gained approx 20-30 lb weight since discharge from rehab after R hip replacement during the summer. Was told to recently increase home bumex dose to BID but no appreciable change. She also notes RUE has become red, and so has distal RLE. Pt has not complained of pain in these regions. Otherwise, no noted fevers/chills, no cough/sob, no abd pain, n/v/d, no urinary complaints. No recent sick contacts or travel.     In ED: 99.8, 84, 111/61, 18, 93RA. Tylenol, ancef 1gm iv.

## 2022-01-08 NOTE — CONSULT NOTE ADULT - PROVIDER SPECIALTY LIST ADULT
Around 8am I was called to see a Pt due to Pt's attempt to cut his wrist with a broken plastic cup. Pt was seen in bed hold by 2 staff members. Pt stated that a staff member on the unit wanted to kill him, and he decided to kill himself first. 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 Pt was placed in seclusion and given IM injections of Ativan 2mg and Haldol 5mg due to acute psychosis with self-injurious behavior. Nephrology

## 2022-01-08 NOTE — H&P ADULT - PROBLEM SELECTOR PLAN 5
Baseline Cr between 1.5-1.8, appears to be at baseline now  - continue monitoring while on diuresis  - renally dose medications  - strict i/o      dispo: pending clinical progress, response to diuresis, weaning o2, dopplers, final abx plan and PT evaluation. Discussed with daughter and floor NP.

## 2022-01-09 LAB
A1C WITH ESTIMATED AVERAGE GLUCOSE RESULT: 8 % — HIGH (ref 4–5.6)
ALBUMIN SERPL ELPH-MCNC: 3.4 G/DL — SIGNIFICANT CHANGE UP (ref 3.3–5)
ALP SERPL-CCNC: 85 U/L — SIGNIFICANT CHANGE UP (ref 40–120)
ALT FLD-CCNC: 6 U/L — LOW (ref 10–45)
ANION GAP SERPL CALC-SCNC: 15 MMOL/L — SIGNIFICANT CHANGE UP (ref 5–17)
AST SERPL-CCNC: 13 U/L — SIGNIFICANT CHANGE UP (ref 10–40)
BILIRUB SERPL-MCNC: 0.3 MG/DL — SIGNIFICANT CHANGE UP (ref 0.2–1.2)
BUN SERPL-MCNC: 34 MG/DL — HIGH (ref 7–23)
CALCIUM SERPL-MCNC: 9.3 MG/DL — SIGNIFICANT CHANGE UP (ref 8.4–10.5)
CHLORIDE SERPL-SCNC: 99 MMOL/L — SIGNIFICANT CHANGE UP (ref 96–108)
CO2 SERPL-SCNC: 24 MMOL/L — SIGNIFICANT CHANGE UP (ref 22–31)
CREAT SERPL-MCNC: 1.5 MG/DL — HIGH (ref 0.5–1.3)
ESTIMATED AVERAGE GLUCOSE: 183 MG/DL — HIGH (ref 68–114)
GLUCOSE BLDC GLUCOMTR-MCNC: 149 MG/DL — HIGH (ref 70–99)
GLUCOSE BLDC GLUCOMTR-MCNC: 165 MG/DL — HIGH (ref 70–99)
GLUCOSE BLDC GLUCOMTR-MCNC: 178 MG/DL — HIGH (ref 70–99)
GLUCOSE BLDC GLUCOMTR-MCNC: 191 MG/DL — HIGH (ref 70–99)
GLUCOSE BLDC GLUCOMTR-MCNC: 192 MG/DL — HIGH (ref 70–99)
GLUCOSE SERPL-MCNC: 161 MG/DL — HIGH (ref 70–99)
HCT VFR BLD CALC: 34.4 % — LOW (ref 34.5–45)
HGB BLD-MCNC: 10.1 G/DL — LOW (ref 11.5–15.5)
MCHC RBC-ENTMCNC: 26.4 PG — LOW (ref 27–34)
MCHC RBC-ENTMCNC: 29.4 GM/DL — LOW (ref 32–36)
MCV RBC AUTO: 90.1 FL — SIGNIFICANT CHANGE UP (ref 80–100)
NRBC # BLD: 0 /100 WBCS — SIGNIFICANT CHANGE UP (ref 0–0)
PLATELET # BLD AUTO: 247 K/UL — SIGNIFICANT CHANGE UP (ref 150–400)
POTASSIUM SERPL-MCNC: 3.9 MMOL/L — SIGNIFICANT CHANGE UP (ref 3.5–5.3)
POTASSIUM SERPL-SCNC: 3.9 MMOL/L — SIGNIFICANT CHANGE UP (ref 3.5–5.3)
PROT SERPL-MCNC: 6.7 G/DL — SIGNIFICANT CHANGE UP (ref 6–8.3)
RBC # BLD: 3.82 M/UL — SIGNIFICANT CHANGE UP (ref 3.8–5.2)
RBC # FLD: 17.4 % — HIGH (ref 10.3–14.5)
SODIUM SERPL-SCNC: 138 MMOL/L — SIGNIFICANT CHANGE UP (ref 135–145)
WBC # BLD: 8.86 K/UL — SIGNIFICANT CHANGE UP (ref 3.8–10.5)
WBC # FLD AUTO: 8.86 K/UL — SIGNIFICANT CHANGE UP (ref 3.8–10.5)

## 2022-01-09 PROCEDURE — 99232 SBSQ HOSP IP/OBS MODERATE 35: CPT | Mod: GC

## 2022-01-09 RX ADMIN — BUMETANIDE 1 MILLIGRAM(S): 0.25 INJECTION INTRAMUSCULAR; INTRAVENOUS at 13:41

## 2022-01-09 RX ADMIN — GABAPENTIN 300 MILLIGRAM(S): 400 CAPSULE ORAL at 17:49

## 2022-01-09 RX ADMIN — GABAPENTIN 300 MILLIGRAM(S): 400 CAPSULE ORAL at 06:56

## 2022-01-09 RX ADMIN — Medication 1: at 08:11

## 2022-01-09 RX ADMIN — APIXABAN 2.5 MILLIGRAM(S): 2.5 TABLET, FILM COATED ORAL at 17:49

## 2022-01-09 RX ADMIN — Medication 100 MILLIGRAM(S): at 20:57

## 2022-01-09 RX ADMIN — Medication 81 MILLIGRAM(S): at 12:06

## 2022-01-09 RX ADMIN — APIXABAN 2.5 MILLIGRAM(S): 2.5 TABLET, FILM COATED ORAL at 06:56

## 2022-01-09 RX ADMIN — Medication 650 MILLIGRAM(S): at 21:07

## 2022-01-09 RX ADMIN — Medication 100 MILLIGRAM(S): at 08:52

## 2022-01-09 RX ADMIN — BUMETANIDE 1 MILLIGRAM(S): 0.25 INJECTION INTRAMUSCULAR; INTRAVENOUS at 21:07

## 2022-01-09 RX ADMIN — ATORVASTATIN CALCIUM 40 MILLIGRAM(S): 80 TABLET, FILM COATED ORAL at 21:07

## 2022-01-09 RX ADMIN — BUMETANIDE 1 MILLIGRAM(S): 0.25 INJECTION INTRAMUSCULAR; INTRAVENOUS at 06:56

## 2022-01-09 RX ADMIN — Medication 1: at 12:15

## 2022-01-09 RX ADMIN — SPIRONOLACTONE 12.5 MILLIGRAM(S): 25 TABLET, FILM COATED ORAL at 06:56

## 2022-01-09 RX ADMIN — Medication 1: at 17:50

## 2022-01-09 RX ADMIN — FAMOTIDINE 20 MILLIGRAM(S): 10 INJECTION INTRAVENOUS at 12:05

## 2022-01-09 RX ADMIN — AMLODIPINE BESYLATE 5 MILLIGRAM(S): 2.5 TABLET ORAL at 06:57

## 2022-01-09 RX ADMIN — SENNA PLUS 2 TABLET(S): 8.6 TABLET ORAL at 21:07

## 2022-01-09 RX ADMIN — Medication 150 MILLIGRAM(S): at 06:57

## 2022-01-09 NOTE — PROGRESS NOTE ADULT - SUBJECTIVE AND OBJECTIVE BOX
[facility] Division of Hospital Medicine  Joelle Moctezuma DO  8a-5pm: [phone]  after 5pm call [phone]    Patient is a 81y old  Female who presents with a chief complaint of L shoulder pain (2022 12:59)        SUBJECTIVE / OVERNIGHT EVENTS: erythema slightly improved       MEDICATIONS  (STANDING):  amLODIPine   Tablet 5 milliGRAM(s) Oral daily  apixaban 2.5 milliGRAM(s) Oral every 12 hours  aspirin  chewable 81 milliGRAM(s) Oral daily  atorvastatin 40 milliGRAM(s) Oral at bedtime  buMETAnide Injectable 1 milliGRAM(s) IV Push every 8 hours  ceFAZolin   IVPB 2000 milliGRAM(s) IV Intermittent every 12 hours  dextrose 40% Gel 15 Gram(s) Oral once  dextrose 5%. 1000 milliLiter(s) (50 mL/Hr) IV Continuous <Continuous>  dextrose 5%. 1000 milliLiter(s) (100 mL/Hr) IV Continuous <Continuous>  dextrose 50% Injectable 25 Gram(s) IV Push once  dextrose 50% Injectable 12.5 Gram(s) IV Push once  dextrose 50% Injectable 25 Gram(s) IV Push once  famotidine    Tablet 20 milliGRAM(s) Oral daily  gabapentin 300 milliGRAM(s) Oral two times a day  glucagon  Injectable 1 milliGRAM(s) IntraMuscular once  insulin lispro (ADMELOG) corrective regimen sliding scale   SubCutaneous three times a day before meals  insulin lispro (ADMELOG) corrective regimen sliding scale   SubCutaneous at bedtime  metoprolol succinate  milliGRAM(s) Oral daily  senna 2 Tablet(s) Oral at bedtime  spironolactone 12.5 milliGRAM(s) Oral daily    MEDICATIONS  (PRN):  acetaminophen     Tablet .. 650 milliGRAM(s) Oral every 6 hours PRN Temp greater or equal to 38C (100.4F), Mild Pain (1 - 3)  aluminum hydroxide/magnesium hydroxide/simethicone Suspension 30 milliLiter(s) Oral every 4 hours PRN Dyspepsia  melatonin 3 milliGRAM(s) Oral at bedtime PRN Insomnia  ondansetron Injectable 4 milliGRAM(s) IV Push every 8 hours PRN Nausea and/or Vomiting  polyethylene glycol 3350 17 Gram(s) Oral at bedtime PRN Constipation  traMADol 25 milliGRAM(s) Oral three times a day PRN Severe Pain (7 - 10)      Vital Signs Last 24 Hrs  T(C): 36.4 (2022 11:40), Max: 37.4 (2022 13:35)  T(F): 97.6 (2022 11:40), Max: 99.3 (2022 13:35)  HR: 72 (2022 11:40) (68 - 131)  BP: 137/80 (2022 11:40) (119/68 - 145/84)  BP(mean): --  RR: 18 (2022 11:40) (16 - 22)  SpO2: 98% (2022 11:40) (97% - 100%)  CAPILLARY BLOOD GLUCOSE      POCT Blood Glucose.: 192 mg/dL (2022 12:07)  POCT Blood Glucose.: 178 mg/dL (2022 08:02)  POCT Blood Glucose.: 149 mg/dL (2022 01:15)    I&O's Summary    2022 07:01  -  2022 13:24  --------------------------------------------------------  IN: 240 mL / OUT: 0 mL / NET: 240 mL        PHYSICAL EXAM:  GENERAL: NAD, breathing normal  HEAD:  Atraumatic, Normocephalic  EYES: conjunctiva and sclera clear  NECK: supple, No JVD  CHEST/LUNG: CTA b/l  HEART: S1 S2 RRR  ABDOMEN: +BS Soft, NT/ND  EXTREMITIES:  2+ DP Pulses, No c/c. RUE with significant edema related to lymphedema, trace b/l LE edema  NEUROLOGY: AAOx3, no facial droop, no focal deficits   SKIN: erythema and warmth of entire right arm. slight erythema on right LE with warmth    LABS:                        10.1   8.86  )-----------( 247      ( 2022 07:46 )             34.4     01-09    138  |  99  |  34<H>  ----------------------------<  161<H>  3.9   |  24  |  1.50<H>    Ca    9.3      2022 07:38  Mg     2.0         TPro  6.7  /  Alb  3.4  /  TBili  0.3  /  DBili  x   /  AST  13  /  ALT  6<L>  /  AlkPhos  85      PT/INR - ( 2022 19:18 )   PT: 15.2 sec;   INR: 1.28 ratio         PTT - ( 2022 19:18 )  PTT:27.3 sec      Urinalysis Basic - ( 2022 02:17 )    Color: Light Yellow / Appearance: Clear / S.017 / pH: x  Gluc: x / Ketone: Negative  / Bili: Negative / Urobili: Negative   Blood: x / Protein: 30 mg/dL / Nitrite: Negative   Leuk Esterase: Negative / RBC: 1 /hpf / WBC 2 /HPF   Sq Epi: x / Non Sq Epi: 1 /hpf / Bacteria: Negative        RADIOLOGY & ADDITIONAL TESTS:    Imaging Personally Reviewed:  Consultant(s) Notes Reviewed:    Care Discussed with Consultants/Other Providers:

## 2022-01-09 NOTE — PROGRESS NOTE ADULT - ASSESSMENT
82yo F hx of breast CA, DM, HTN, HLD, CHF, chronic RUE lymphedema comes to ED for progressive L shoulder pain x weeks and concern for fluid overload due to ADHF, RUE redness concerning for cellulitis vs. DVT, RLE redness concerning for cellulitis.

## 2022-01-10 LAB
ALBUMIN SERPL ELPH-MCNC: 3.5 G/DL — SIGNIFICANT CHANGE UP (ref 3.3–5)
ALP SERPL-CCNC: 81 U/L — SIGNIFICANT CHANGE UP (ref 40–120)
ALT FLD-CCNC: 8 U/L — LOW (ref 10–45)
ANION GAP SERPL CALC-SCNC: 14 MMOL/L — SIGNIFICANT CHANGE UP (ref 5–17)
AST SERPL-CCNC: 14 U/L — SIGNIFICANT CHANGE UP (ref 10–40)
BILIRUB SERPL-MCNC: 0.3 MG/DL — SIGNIFICANT CHANGE UP (ref 0.2–1.2)
BUN SERPL-MCNC: 36 MG/DL — HIGH (ref 7–23)
CALCIUM SERPL-MCNC: 9.3 MG/DL — SIGNIFICANT CHANGE UP (ref 8.4–10.5)
CHLORIDE SERPL-SCNC: 100 MMOL/L — SIGNIFICANT CHANGE UP (ref 96–108)
CO2 SERPL-SCNC: 26 MMOL/L — SIGNIFICANT CHANGE UP (ref 22–31)
CREAT SERPL-MCNC: 1.64 MG/DL — HIGH (ref 0.5–1.3)
GLUCOSE BLDC GLUCOMTR-MCNC: 150 MG/DL — HIGH (ref 70–99)
GLUCOSE BLDC GLUCOMTR-MCNC: 183 MG/DL — HIGH (ref 70–99)
GLUCOSE BLDC GLUCOMTR-MCNC: 207 MG/DL — HIGH (ref 70–99)
GLUCOSE BLDC GLUCOMTR-MCNC: 294 MG/DL — HIGH (ref 70–99)
GLUCOSE SERPL-MCNC: 150 MG/DL — HIGH (ref 70–99)
HCT VFR BLD CALC: 36.7 % — SIGNIFICANT CHANGE UP (ref 34.5–45)
HGB BLD-MCNC: 11 G/DL — LOW (ref 11.5–15.5)
MAGNESIUM SERPL-MCNC: 2.2 MG/DL — SIGNIFICANT CHANGE UP (ref 1.6–2.6)
MCHC RBC-ENTMCNC: 27 PG — SIGNIFICANT CHANGE UP (ref 27–34)
MCHC RBC-ENTMCNC: 30 GM/DL — LOW (ref 32–36)
MCV RBC AUTO: 90 FL — SIGNIFICANT CHANGE UP (ref 80–100)
NRBC # BLD: 0 /100 WBCS — SIGNIFICANT CHANGE UP (ref 0–0)
PLATELET # BLD AUTO: 247 K/UL — SIGNIFICANT CHANGE UP (ref 150–400)
POTASSIUM SERPL-MCNC: 4 MMOL/L — SIGNIFICANT CHANGE UP (ref 3.5–5.3)
POTASSIUM SERPL-SCNC: 4 MMOL/L — SIGNIFICANT CHANGE UP (ref 3.5–5.3)
PROT SERPL-MCNC: 7.1 G/DL — SIGNIFICANT CHANGE UP (ref 6–8.3)
RBC # BLD: 4.08 M/UL — SIGNIFICANT CHANGE UP (ref 3.8–5.2)
RBC # FLD: 17.5 % — HIGH (ref 10.3–14.5)
SODIUM SERPL-SCNC: 140 MMOL/L — SIGNIFICANT CHANGE UP (ref 135–145)
WBC # BLD: 6.77 K/UL — SIGNIFICANT CHANGE UP (ref 3.8–10.5)
WBC # FLD AUTO: 6.77 K/UL — SIGNIFICANT CHANGE UP (ref 3.8–10.5)

## 2022-01-10 PROCEDURE — 99232 SBSQ HOSP IP/OBS MODERATE 35: CPT

## 2022-01-10 RX ADMIN — Medication 650 MILLIGRAM(S): at 09:31

## 2022-01-10 RX ADMIN — BUMETANIDE 1 MILLIGRAM(S): 0.25 INJECTION INTRAMUSCULAR; INTRAVENOUS at 22:44

## 2022-01-10 RX ADMIN — Medication 150 MILLIGRAM(S): at 05:26

## 2022-01-10 RX ADMIN — Medication 650 MILLIGRAM(S): at 09:01

## 2022-01-10 RX ADMIN — BUMETANIDE 1 MILLIGRAM(S): 0.25 INJECTION INTRAMUSCULAR; INTRAVENOUS at 13:22

## 2022-01-10 RX ADMIN — FAMOTIDINE 20 MILLIGRAM(S): 10 INJECTION INTRAVENOUS at 11:43

## 2022-01-10 RX ADMIN — Medication 3: at 12:23

## 2022-01-10 RX ADMIN — BUMETANIDE 1 MILLIGRAM(S): 0.25 INJECTION INTRAMUSCULAR; INTRAVENOUS at 05:26

## 2022-01-10 RX ADMIN — AMLODIPINE BESYLATE 5 MILLIGRAM(S): 2.5 TABLET ORAL at 05:28

## 2022-01-10 RX ADMIN — GABAPENTIN 300 MILLIGRAM(S): 400 CAPSULE ORAL at 05:27

## 2022-01-10 RX ADMIN — GABAPENTIN 300 MILLIGRAM(S): 400 CAPSULE ORAL at 17:48

## 2022-01-10 RX ADMIN — APIXABAN 2.5 MILLIGRAM(S): 2.5 TABLET, FILM COATED ORAL at 17:48

## 2022-01-10 RX ADMIN — Medication 100 MILLIGRAM(S): at 05:28

## 2022-01-10 RX ADMIN — SPIRONOLACTONE 12.5 MILLIGRAM(S): 25 TABLET, FILM COATED ORAL at 05:27

## 2022-01-10 RX ADMIN — ATORVASTATIN CALCIUM 40 MILLIGRAM(S): 80 TABLET, FILM COATED ORAL at 22:37

## 2022-01-10 RX ADMIN — APIXABAN 2.5 MILLIGRAM(S): 2.5 TABLET, FILM COATED ORAL at 05:26

## 2022-01-10 RX ADMIN — SENNA PLUS 2 TABLET(S): 8.6 TABLET ORAL at 22:37

## 2022-01-10 RX ADMIN — Medication 81 MILLIGRAM(S): at 11:44

## 2022-01-10 RX ADMIN — Medication 1: at 08:57

## 2022-01-10 NOTE — PROGRESS NOTE ADULT - SUBJECTIVE AND OBJECTIVE BOX
Resting comfortably       VITAL:  T(C): , Max: 37.1 (01-09-22 @ 20:10)  T(F): , Max: 98.8 (01-09-22 @ 20:10)  HR: 64 (01-10-22 @ 04:49)  BP: 126/73 (01-10-22 @ 04:49)  BP(mean): --  RR: 18 (01-10-22 @ 04:49)  SpO2: 93% (01-10-22 @ 04:49)  Wt(kg): --      PHYSICAL EXAM:  General: Alerted, oriented  HEENT: MMM  Lungs:CTA-b/l  Heart: RRR S1S2  Abdomen: Soft, NTND  Ext: no pedal edema b/l, RUE nonpitting edema  : no chen      LABS:                        11.0   6.77  )-----------( 247      ( 10 Sunny 2022 07:31 )             36.7     Na(140)/K(4.0)/Cl(100)/HCO3(26)/BUN(36)/Cr(1.64)Glu(150)/Ca(9.3)/Mg(2.2)/PO4(--)    01-10 @ 07:31  Na(138)/K(3.9)/Cl(99)/HCO3(24)/BUN(34)/Cr(1.50)Glu(161)/Ca(9.3)/Mg(--)/PO4(--)    01-09 @ 07:38  Na(138)/K(3.9)/Cl(99)/HCO3(26)/BUN(32)/Cr(1.56)Glu(207)/Ca(8.9)/Mg(--)/PO4(--)    01-08 @ 12:07`  Na(133)/K(5.1)/Cl(96)/HCO3(21)/BUN(38)/Cr(1.67)Glu(218)/Ca(9.2)/Mg(2.0)/PO4(--)    01-07 @ 19:18        (1)Renal - nonproteinuric CKD3b - likely due to microvascular disease. At/near baseline GFR  (2)Lytes - grossly acceptable  (3)ID - RUE cellulitis    RECOMMEND:  (1)No objection to IV diuretics-  (2)Abx for GFR 30-40ml/min  (3)BMP daily      Ginger Jerry MD  [facility]  Office: [phone]

## 2022-01-10 NOTE — PROGRESS NOTE ADULT - ASSESSMENT
80yo F hx of breast CA, DM, HTN, HLD, CHF, chronic RUE lymphedema comes to ED 1/8/22 for progressive L shoulder pain x weeks and concern for fluid overload due to ADHF, RUE redness concerning for cellulitis.

## 2022-01-10 NOTE — PROGRESS NOTE ADULT - SUBJECTIVE AND OBJECTIVE BOX
[facility] Division of Hospital Medicine  Delaney Morataya MD  Pager (M-F, 0N-3J): [phone]  Other Times:  [phone]    Patient is a 81y old  Female who presents with a chief complaint of L shoulder pain (10 Sunny 2022 12:48)      SUBJECTIVE / OVERNIGHT EVENTS:  no acute events overnight    ADDITIONAL REVIEW OF SYSTEMS:    MEDICATIONS  (STANDING):  amLODIPine   Tablet 5 milliGRAM(s) Oral daily  apixaban 2.5 milliGRAM(s) Oral every 12 hours  aspirin  chewable 81 milliGRAM(s) Oral daily  atorvastatin 40 milliGRAM(s) Oral at bedtime  buMETAnide Injectable 1 milliGRAM(s) IV Push every 8 hours  ceFAZolin   IVPB 2000 milliGRAM(s) IV Intermittent every 12 hours  dextrose 40% Gel 15 Gram(s) Oral once  dextrose 5%. 1000 milliLiter(s) (50 mL/Hr) IV Continuous <Continuous>  dextrose 5%. 1000 milliLiter(s) (100 mL/Hr) IV Continuous <Continuous>  dextrose 50% Injectable 25 Gram(s) IV Push once  dextrose 50% Injectable 12.5 Gram(s) IV Push once  dextrose 50% Injectable 25 Gram(s) IV Push once  famotidine    Tablet 20 milliGRAM(s) Oral daily  gabapentin 300 milliGRAM(s) Oral two times a day  glucagon  Injectable 1 milliGRAM(s) IntraMuscular once  insulin lispro (ADMELOG) corrective regimen sliding scale   SubCutaneous three times a day before meals  insulin lispro (ADMELOG) corrective regimen sliding scale   SubCutaneous at bedtime  metoprolol succinate  milliGRAM(s) Oral daily  senna 2 Tablet(s) Oral at bedtime  spironolactone 12.5 milliGRAM(s) Oral daily    MEDICATIONS  (PRN):  acetaminophen     Tablet .. 650 milliGRAM(s) Oral every 6 hours PRN Temp greater or equal to 38C (100.4F), Mild Pain (1 - 3)  aluminum hydroxide/magnesium hydroxide/simethicone Suspension 30 milliLiter(s) Oral every 4 hours PRN Dyspepsia  melatonin 3 milliGRAM(s) Oral at bedtime PRN Insomnia  ondansetron Injectable 4 milliGRAM(s) IV Push every 8 hours PRN Nausea and/or Vomiting  polyethylene glycol 3350 17 Gram(s) Oral at bedtime PRN Constipation  traMADol 25 milliGRAM(s) Oral three times a day PRN Severe Pain (7 - 10)      CAPILLARY BLOOD GLUCOSE      POCT Blood Glucose.: 294 mg/dL (10 Sunny 2022 12:17)  POCT Blood Glucose.: 183 mg/dL (10 Sunny 2022 08:09)  POCT Blood Glucose.: 165 mg/dL (09 Jan 2022 21:38)  POCT Blood Glucose.: 191 mg/dL (09 Jan 2022 17:32)    I&O's Summary    09 Jan 2022 07:01  -  10 Sunny 2022 07:00  --------------------------------------------------------  IN: 1180 mL / OUT: 1700 mL / NET: -520 mL        PHYSICAL EXAM:  Vital Signs Last 24 Hrs  T(C): 36.7 (10 Sunny 2022 13:00), Max: 37.1 (09 Jan 2022 20:10)  T(F): 98 (10 Sunny 2022 13:00), Max: 98.8 (09 Jan 2022 20:10)  HR: 70 (10 Sunny 2022 13:00) (64 - 71)  BP: 129/70 (10 Sunny 2022 13:00) (126/73 - 155/69)  BP(mean): --  RR: 18 (10 Sunny 2022 13:00) (18 - 18)  SpO2: 94% (10 Sunny 2022 13:00) (93% - 96%)  GENERAL: NAD, breathing normal  HEAD:  Atraumatic, Normocephalic  EYES: conjunctiva and sclera clear  NECK: supple, No JVD  CHEST/LUNG: CTA b/l  HEART: S1 S2 RRR  ABDOMEN: +BS Soft, NT/ND  EXTREMITIES:  2+ DP Pulses, No c/c. RUE with significant edema related to lymphedema, trace b/l LE edema  NEUROLOGY: AAOx3, no facial droop, no focal deficits   SKIN: erythema and warmth of entire right arm. slight erythema on right LE with warmth      LABS:                        11.0   6.77  )-----------( 247      ( 10 Sunny 2022 07:31 )             36.7     01-10    140  |  100  |  36<H>  ----------------------------<  150<H>  4.0   |  26  |  1.64<H>    Ca    9.3      10 Sunny 2022 07:31  Mg     2.2     01-10    TPro  7.1  /  Alb  3.5  /  TBili  0.3  /  DBili  x   /  AST  14  /  ALT  8<L>  /  AlkPhos  81  01-10                RADIOLOGY & ADDITIONAL TESTS:  Results Reviewed:   Imaging Personally Reviewed:  Electrocardiogram Personally Reviewed:    COORDINATION OF CARE:  Care Discussed with Consultants/Other Providers [Y/N]:  Prior or Outpatient Records Reviewed [Y/N]:

## 2022-01-11 LAB
ANION GAP SERPL CALC-SCNC: 13 MMOL/L — SIGNIFICANT CHANGE UP (ref 5–17)
BUN SERPL-MCNC: 41 MG/DL — HIGH (ref 7–23)
CALCIUM SERPL-MCNC: 9.3 MG/DL — SIGNIFICANT CHANGE UP (ref 8.4–10.5)
CHLORIDE SERPL-SCNC: 99 MMOL/L — SIGNIFICANT CHANGE UP (ref 96–108)
CO2 SERPL-SCNC: 27 MMOL/L — SIGNIFICANT CHANGE UP (ref 22–31)
CREAT SERPL-MCNC: 1.77 MG/DL — HIGH (ref 0.5–1.3)
GLUCOSE BLDC GLUCOMTR-MCNC: 206 MG/DL — HIGH (ref 70–99)
GLUCOSE BLDC GLUCOMTR-MCNC: 218 MG/DL — HIGH (ref 70–99)
GLUCOSE BLDC GLUCOMTR-MCNC: 236 MG/DL — HIGH (ref 70–99)
GLUCOSE BLDC GLUCOMTR-MCNC: 246 MG/DL — HIGH (ref 70–99)
GLUCOSE SERPL-MCNC: 174 MG/DL — HIGH (ref 70–99)
HCT VFR BLD CALC: 35.6 % — SIGNIFICANT CHANGE UP (ref 34.5–45)
HGB BLD-MCNC: 10.5 G/DL — LOW (ref 11.5–15.5)
MAGNESIUM SERPL-MCNC: 2.1 MG/DL — SIGNIFICANT CHANGE UP (ref 1.6–2.6)
MCHC RBC-ENTMCNC: 26.6 PG — LOW (ref 27–34)
MCHC RBC-ENTMCNC: 29.5 GM/DL — LOW (ref 32–36)
MCV RBC AUTO: 90.4 FL — SIGNIFICANT CHANGE UP (ref 80–100)
NRBC # BLD: 0 /100 WBCS — SIGNIFICANT CHANGE UP (ref 0–0)
PHOSPHATE SERPL-MCNC: 3 MG/DL — SIGNIFICANT CHANGE UP (ref 2.5–4.5)
PLATELET # BLD AUTO: 288 K/UL — SIGNIFICANT CHANGE UP (ref 150–400)
POTASSIUM SERPL-MCNC: 3.8 MMOL/L — SIGNIFICANT CHANGE UP (ref 3.5–5.3)
POTASSIUM SERPL-SCNC: 3.8 MMOL/L — SIGNIFICANT CHANGE UP (ref 3.5–5.3)
RBC # BLD: 3.94 M/UL — SIGNIFICANT CHANGE UP (ref 3.8–5.2)
RBC # FLD: 17.2 % — HIGH (ref 10.3–14.5)
SODIUM SERPL-SCNC: 139 MMOL/L — SIGNIFICANT CHANGE UP (ref 135–145)
WBC # BLD: 6.88 K/UL — SIGNIFICANT CHANGE UP (ref 3.8–10.5)
WBC # FLD AUTO: 6.88 K/UL — SIGNIFICANT CHANGE UP (ref 3.8–10.5)

## 2022-01-11 PROCEDURE — 93306 TTE W/DOPPLER COMPLETE: CPT | Mod: 26

## 2022-01-11 PROCEDURE — 99232 SBSQ HOSP IP/OBS MODERATE 35: CPT

## 2022-01-11 RX ORDER — BUMETANIDE 0.25 MG/ML
1 INJECTION INTRAMUSCULAR; INTRAVENOUS
Refills: 0 | Status: DISCONTINUED | OUTPATIENT
Start: 2022-01-12 | End: 2022-01-13

## 2022-01-11 RX ORDER — INSULIN GLARGINE 100 [IU]/ML
3 INJECTION, SOLUTION SUBCUTANEOUS AT BEDTIME
Refills: 0 | Status: DISCONTINUED | OUTPATIENT
Start: 2022-01-11 | End: 2022-01-12

## 2022-01-11 RX ADMIN — GABAPENTIN 300 MILLIGRAM(S): 400 CAPSULE ORAL at 17:52

## 2022-01-11 RX ADMIN — FAMOTIDINE 20 MILLIGRAM(S): 10 INJECTION INTRAVENOUS at 12:29

## 2022-01-11 RX ADMIN — Medication 100 MILLIGRAM(S): at 06:02

## 2022-01-11 RX ADMIN — Medication 150 MILLIGRAM(S): at 06:03

## 2022-01-11 RX ADMIN — Medication 650 MILLIGRAM(S): at 06:57

## 2022-01-11 RX ADMIN — Medication 81 MILLIGRAM(S): at 12:29

## 2022-01-11 RX ADMIN — BUMETANIDE 1 MILLIGRAM(S): 0.25 INJECTION INTRAMUSCULAR; INTRAVENOUS at 06:06

## 2022-01-11 RX ADMIN — Medication 650 MILLIGRAM(S): at 22:57

## 2022-01-11 RX ADMIN — APIXABAN 2.5 MILLIGRAM(S): 2.5 TABLET, FILM COATED ORAL at 06:02

## 2022-01-11 RX ADMIN — APIXABAN 2.5 MILLIGRAM(S): 2.5 TABLET, FILM COATED ORAL at 17:52

## 2022-01-11 RX ADMIN — Medication 3 MILLIGRAM(S): at 22:56

## 2022-01-11 RX ADMIN — ATORVASTATIN CALCIUM 40 MILLIGRAM(S): 80 TABLET, FILM COATED ORAL at 22:53

## 2022-01-11 RX ADMIN — Medication 650 MILLIGRAM(S): at 07:27

## 2022-01-11 RX ADMIN — GABAPENTIN 300 MILLIGRAM(S): 400 CAPSULE ORAL at 06:03

## 2022-01-11 RX ADMIN — Medication 2: at 17:53

## 2022-01-11 RX ADMIN — SPIRONOLACTONE 12.5 MILLIGRAM(S): 25 TABLET, FILM COATED ORAL at 06:03

## 2022-01-11 RX ADMIN — Medication 100 MILLIGRAM(S): at 17:53

## 2022-01-11 RX ADMIN — Medication 2: at 09:16

## 2022-01-11 RX ADMIN — AMLODIPINE BESYLATE 5 MILLIGRAM(S): 2.5 TABLET ORAL at 06:03

## 2022-01-11 RX ADMIN — Medication 2: at 12:42

## 2022-01-11 RX ADMIN — INSULIN GLARGINE 3 UNIT(S): 100 INJECTION, SOLUTION SUBCUTANEOUS at 22:53

## 2022-01-11 RX ADMIN — Medication 650 MILLIGRAM(S): at 23:27

## 2022-01-11 NOTE — OCCUPATIONAL THERAPY INITIAL EVALUATION ADULT - ANTICIPATED DISCHARGE DISPOSITION, OT EVAL
[facility]/rehabilitation facility home with OT to address safe engagement in ADLs and functional tasks./home w/ OT

## 2022-01-11 NOTE — OCCUPATIONAL THERAPY INITIAL EVALUATION ADULT - ADL RETRAINING, OT EVAL
GOAL: pt will be independent with all UB/LB dressing tasks with use of AE within 4 weeks. GOAL: Pt will be independent in all toileting tasks with use of AE within 4 weeks. GOAL: Pt will require min A for all bathing tasks within 4 weeks.

## 2022-01-11 NOTE — PROGRESS NOTE ADULT - SUBJECTIVE AND OBJECTIVE BOX
[facility] [phone]        Patient seen and examined         MEDICATIONS  (STANDING):  amLODIPine   Tablet 5 milliGRAM(s) Oral daily  apixaban 2.5 milliGRAM(s) Oral every 12 hours  aspirin  chewable 81 milliGRAM(s) Oral daily  atorvastatin 40 milliGRAM(s) Oral at bedtime  ceFAZolin   IVPB 2000 milliGRAM(s) IV Intermittent every 12 hours  dextrose 40% Gel 15 Gram(s) Oral once  dextrose 5%. 1000 milliLiter(s) (50 mL/Hr) IV Continuous <Continuous>  dextrose 5%. 1000 milliLiter(s) (100 mL/Hr) IV Continuous <Continuous>  dextrose 50% Injectable 25 Gram(s) IV Push once  dextrose 50% Injectable 12.5 Gram(s) IV Push once  dextrose 50% Injectable 25 Gram(s) IV Push once  famotidine    Tablet 20 milliGRAM(s) Oral daily  gabapentin 300 milliGRAM(s) Oral two times a day  glucagon  Injectable 1 milliGRAM(s) IntraMuscular once  insulin lispro (ADMELOG) corrective regimen sliding scale   SubCutaneous three times a day before meals  insulin lispro (ADMELOG) corrective regimen sliding scale   SubCutaneous at bedtime  metoprolol succinate  milliGRAM(s) Oral daily  senna 2 Tablet(s) Oral at bedtime  spironolactone 12.5 milliGRAM(s) Oral daily      VITAL:  T(C): , Max: 36.9 (01-10-22 @ 20:10)  T(F): , Max: 98.4 (01-10-22 @ 20:10)  HR: 71 (01-11-22 @ 10:10)  BP: 123/76 (01-11-22 @ 10:10)  BP(mean): --  RR: 19 (01-11-22 @ 04:39)  SpO2: 92% (01-11-22 @ 10:10)  Wt(kg): --    I and O's:    01-10 @ 07:01  -  01-11 @ 07:00  --------------------------------------------------------  IN: 120 mL / OUT: 1100 mL / NET: -980 mL          PHYSICAL EXAM:    Constitutional: NAD  Neck:  No JVD  Respiratory: CTAB/L  Cardiovascular: S1 and S2  Gastrointestinal: BS+, soft, NT/ND  Extremities: No peripheral edema  Neurological: A/O x 3, no focal deficits  Psychiatric: Normal mood, normal affect  : No Dowling  Skin: No rashes  Access: Not applicable    LABS:                        10.5   6.88  )-----------( 288      ( 11 Jan 2022 05:44 )             35.6     01-11    139  |  99  |  41<H>  ----------------------------<  174<H>  3.8   |  27  |  1.77<H>    Ca    9.3      11 Jan 2022 05:44  Phos  3.0     01-11  Mg     2.1     01-11    TPro  7.1  /  Alb  3.5  /  TBili  0.3  /  DBili  x   /  AST  14  /  ALT  8<L>  /  AlkPhos  81  01-10          Urine Studies:          RADIOLOGY & ADDITIONAL STUDIES:             [facility] [phone]        Patient seen and examined in bed.   She was complaining of pain on her left shoulder         MEDICATIONS  (STANDING):  amLODIPine   Tablet 5 milliGRAM(s) Oral daily  apixaban 2.5 milliGRAM(s) Oral every 12 hours  aspirin  chewable 81 milliGRAM(s) Oral daily  atorvastatin 40 milliGRAM(s) Oral at bedtime  ceFAZolin   IVPB 2000 milliGRAM(s) IV Intermittent every 12 hours  dextrose 40% Gel 15 Gram(s) Oral once  dextrose 5%. 1000 milliLiter(s) (50 mL/Hr) IV Continuous <Continuous>  dextrose 5%. 1000 milliLiter(s) (100 mL/Hr) IV Continuous <Continuous>  dextrose 50% Injectable 25 Gram(s) IV Push once  dextrose 50% Injectable 12.5 Gram(s) IV Push once  dextrose 50% Injectable 25 Gram(s) IV Push once  famotidine    Tablet 20 milliGRAM(s) Oral daily  gabapentin 300 milliGRAM(s) Oral two times a day  glucagon  Injectable 1 milliGRAM(s) IntraMuscular once  insulin lispro (ADMELOG) corrective regimen sliding scale   SubCutaneous three times a day before meals  insulin lispro (ADMELOG) corrective regimen sliding scale   SubCutaneous at bedtime  metoprolol succinate  milliGRAM(s) Oral daily  senna 2 Tablet(s) Oral at bedtime  spironolactone 12.5 milliGRAM(s) Oral daily      VITAL:  T(C): , Max: 36.9 (01-10-22 @ 20:10)  T(F): , Max: 98.4 (01-10-22 @ 20:10)  HR: 71 (01-11-22 @ 10:10)  BP: 123/76 (01-11-22 @ 10:10)  BP(mean): --  RR: 19 (01-11-22 @ 04:39)  SpO2: 92% (01-11-22 @ 10:10)  Wt(kg): --    I and O's:    01-10 @ 07:01  -  01-11 @ 07:00  --------------------------------------------------------  IN: 120 mL / OUT: 1100 mL / NET: -980 mL          PHYSICAL EXAM:    Constitutional: NAD  Neck:  No JVD  Respiratory: CTAB/L  Cardiovascular: S1 and S2  Gastrointestinal: BS+, soft, NT/ND  Extremities: No peripheral edema  Neurological: A/O x 3, no focal deficits  Psychiatric: Normal mood, normal affect  : No Dowling  Skin: No rashes  Access: Not applicable    LABS:                        10.5   6.88  )-----------( 288      ( 11 Jan 2022 05:44 )             35.6     01-11    139  |  99  |  41<H>  ----------------------------<  174<H>  3.8   |  27  |  1.77<H>    Ca    9.3      11 Jan 2022 05:44  Phos  3.0     01-11  Mg     2.1     01-11    TPro  7.1  /  Alb  3.5  /  TBili  0.3  /  DBili  x   /  AST  14  /  ALT  8<L>  /  AlkPhos  81  01-10          Urine Studies:          RADIOLOGY & ADDITIONAL STUDIES:

## 2022-01-11 NOTE — OCCUPATIONAL THERAPY INITIAL EVALUATION ADULT - RANGE OF MOTION, PT EVAL
GOAL: Pt will demonstrated AROM WFL to LUE without c/o pain within 4 weeks in order to increase participation in ADL tasks.

## 2022-01-11 NOTE — OCCUPATIONAL THERAPY INITIAL EVALUATION ADULT - LEVEL OF INDEPENDENCE: DRESS LOWER BODY, OT EVAL
pt required max A to don/dof socks while sitting EOB due to decreased flexibility, strength and endurance. Pt reported utilizing grabber and sock aid when completing dressing tasks at home/maximum assist (25% patients effort)

## 2022-01-11 NOTE — OCCUPATIONAL THERAPY INITIAL EVALUATION ADULT - RANGE OF MOTION EXAMINATION, UPPER EXTREMITY
pt has increased pain in LUE but demonstrates ROM WFL/bilateral UE Active ROM was WFL  (within functional limits)

## 2022-01-11 NOTE — PROGRESS NOTE ADULT - SUBJECTIVE AND OBJECTIVE BOX
[facility] Division of Hospital Medicine  Delaney Morataya MD  Pager (M-F, 3V-1Q): [phone]  Other Times:  [phone]    Patient is a 81y old  Female who presents with a chief complaint of L shoulder pain (11 Jan 2022 14:39)      SUBJECTIVE / OVERNIGHT EVENTS:  no acute events overnight  still with left shoulder pain tender to palpation    ADDITIONAL REVIEW OF SYSTEMS:    MEDICATIONS  (STANDING):  amLODIPine   Tablet 5 milliGRAM(s) Oral daily  apixaban 2.5 milliGRAM(s) Oral every 12 hours  aspirin  chewable 81 milliGRAM(s) Oral daily  atorvastatin 40 milliGRAM(s) Oral at bedtime  ceFAZolin   IVPB 2000 milliGRAM(s) IV Intermittent every 12 hours  dextrose 40% Gel 15 Gram(s) Oral once  dextrose 5%. 1000 milliLiter(s) (50 mL/Hr) IV Continuous <Continuous>  dextrose 5%. 1000 milliLiter(s) (100 mL/Hr) IV Continuous <Continuous>  dextrose 50% Injectable 25 Gram(s) IV Push once  dextrose 50% Injectable 12.5 Gram(s) IV Push once  dextrose 50% Injectable 25 Gram(s) IV Push once  famotidine    Tablet 20 milliGRAM(s) Oral daily  gabapentin 300 milliGRAM(s) Oral two times a day  glucagon  Injectable 1 milliGRAM(s) IntraMuscular once  insulin lispro (ADMELOG) corrective regimen sliding scale   SubCutaneous three times a day before meals  insulin lispro (ADMELOG) corrective regimen sliding scale   SubCutaneous at bedtime  metoprolol succinate  milliGRAM(s) Oral daily  senna 2 Tablet(s) Oral at bedtime  spironolactone 12.5 milliGRAM(s) Oral daily    MEDICATIONS  (PRN):  acetaminophen     Tablet .. 650 milliGRAM(s) Oral every 6 hours PRN Temp greater or equal to 38C (100.4F), Mild Pain (1 - 3)  aluminum hydroxide/magnesium hydroxide/simethicone Suspension 30 milliLiter(s) Oral every 4 hours PRN Dyspepsia  melatonin 3 milliGRAM(s) Oral at bedtime PRN Insomnia  ondansetron Injectable 4 milliGRAM(s) IV Push every 8 hours PRN Nausea and/or Vomiting  polyethylene glycol 3350 17 Gram(s) Oral at bedtime PRN Constipation  traMADol 25 milliGRAM(s) Oral three times a day PRN Severe Pain (7 - 10)      CAPILLARY BLOOD GLUCOSE      POCT Blood Glucose.: 246 mg/dL (11 Jan 2022 12:35)  POCT Blood Glucose.: 206 mg/dL (11 Jan 2022 08:31)  POCT Blood Glucose.: 207 mg/dL (10 Sunny 2022 22:08)  POCT Blood Glucose.: 150 mg/dL (10 Sunny 2022 17:40)    I&O's Summary    10 Sunny 2022 07:01  -  11 Jan 2022 07:00  --------------------------------------------------------  IN: 120 mL / OUT: 1100 mL / NET: -980 mL        PHYSICAL EXAM:  Vital Signs Last 24 Hrs  T(C): 36.8 (11 Jan 2022 04:39), Max: 36.9 (10 Sunny 2022 20:10)  T(F): 98.3 (11 Jan 2022 04:39), Max: 98.4 (10 Sunny 2022 20:10)  HR: 71 (11 Jan 2022 10:10) (71 - 73)  BP: 123/76 (11 Jan 2022 10:10) (107/68 - 128/70)  BP(mean): --  RR: 19 (11 Jan 2022 04:39) (19 - 19)  SpO2: 92% (11 Jan 2022 10:10) (92% - 93%)  GENERAL: NAD, breathing normal  HEAD:  Atraumatic, Normocephalic  EYES: conjunctiva and sclera clear  NECK: supple, No JVD  CHEST/LUNG: CTA b/l  HEART: S1 S2 RRR  ABDOMEN: +BS Soft, NT/ND  EXTREMITIES:  2+ DP Pulses, No c/c. RUE with significant edema related to lymphedema, no b/l LE edema  SKIN: no erythema/warmth of either upper extremitiy noted      LABS:                        10.5   6.88  )-----------( 288      ( 11 Jan 2022 05:44 )             35.6     01-11    139  |  99  |  41<H>  ----------------------------<  174<H>  3.8   |  27  |  1.77<H>    Ca    9.3      11 Jan 2022 05:44  Phos  3.0     01-11  Mg     2.1     01-11    TPro  7.1  /  Alb  3.5  /  TBili  0.3  /  DBili  x   /  AST  14  /  ALT  8<L>  /  AlkPhos  81  01-10                RADIOLOGY & ADDITIONAL TESTS:  Results Reviewed:   < from: Xray Shoulder 2 Views, Left (01.07.22 @ 17:26) >  FINDINGS: Small focus of calcification noted adjacent to the humeral head   may reflect calcific tendinosis. There is a rounded body within the  glenohumeral joint space of adjacent to the coracoid measuring 1 cm. And   mild narrowing at the glenohumeral joint. Productive change at the   acromioclavicular joint. Surgical clips overlying the left chest.   Calcifications within the aortic knob.    IMPRESSION:    No acute fracture or dislocation.    < end of copied text >    Imaging Personally Reviewed:  Electrocardiogram Personally Reviewed:    COORDINATION OF CARE:  Care Discussed with Consultants/Other Providers [Y/N]:  Prior or Outpatient Records Reviewed [Y/N]:

## 2022-01-11 NOTE — OCCUPATIONAL THERAPY INITIAL EVALUATION ADULT - PERTINENT HX OF CURRENT PROBLEM, REHAB EVAL
82yo F with progressive L shoulder pain x weeks, worse with movement. Per daughter, has also noted patient has become more swollen and gained approx 20-30 lb weight since discharge from rehab after R hip replacement during the summer. She also notes RUE has become red, and so has distal RLE. Xray shoulder 1/7-No acute fracture or dislocation. VA Duplex 1/8-No evidence of right lower extremity/UE deep venous thrombosis.

## 2022-01-11 NOTE — OCCUPATIONAL THERAPY INITIAL EVALUATION ADULT - GENERAL OBSERVATIONS, REHAB EVAL
pt received semi-supine in bed +IVL, +external cath, and with nasal can pt received semi-supine in bed +IVL, +external cath

## 2022-01-11 NOTE — OCCUPATIONAL THERAPY INITIAL EVALUATION ADULT - VISUAL ASSESSMENT: VISUAL NEGLECT
pt unable to respond to threat on R eye. unable to locate objects in field of view with L eye covered

## 2022-01-11 NOTE — OCCUPATIONAL THERAPY INITIAL EVALUATION ADULT - DIAGNOSIS, OT EVAL
pt presents with increased pain, and decreased strength, endurance, and ROM limiting their ability to engage in ADLs and functional tasks.

## 2022-01-11 NOTE — OCCUPATIONAL THERAPY INITIAL EVALUATION ADULT - VISUAL ASSESSMENT: TRACKING
right to left pt noted with minimal eye movement during tracking assessment, however pt able to scan throughout room. pt may have not understood translation

## 2022-01-11 NOTE — OCCUPATIONAL THERAPY INITIAL EVALUATION ADULT - STRENGTHENING, PT EVAL
GOAL: Pt will increase strength by one muscle grade in 4 weeks to improve ability to complete ADLs and functional tasks.

## 2022-01-11 NOTE — OCCUPATIONAL THERAPY INITIAL EVALUATION ADULT - FINE MOTOR COORDINATION, RIGHT HAND THUMB/FINGER OPPOSITION SKILLS, OT EVAL
You can access the FollowMyHealth Patient Portal offered by [facility] by registering at the following website: [email or web address]. By joining [facility]’s FollowMyHealth portal, you will also be able to view your health information using other applications (apps) compatible with our system.
normal performance

## 2022-01-11 NOTE — OCCUPATIONAL THERAPY INITIAL EVALUATION ADULT - BALANCE TRAINING, PT EVAL
GOAL: pt will increase sitting/standing balance by one grade within 4 weeks in order to increase ability to participate in ADLs and functional tasks.

## 2022-01-11 NOTE — PROGRESS NOTE ADULT - ASSESSMENT
82yo F hx of breast CA, DM, HTN, HLD, CHF, chronic RUE lymphedema comes to ED 1/8/22 for progressive L shoulder pain x weeks and concern for fluid overload due to ADHF, RUE redness concerning for cellulitis.      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 At/near baseline GFR  (2)Lytes - grossly acceptable  (3)ID - RUE cellulitis    RECOMMEND:  (1)No objection to IV diuretics-  (2)Abx for GFR 30-40ml/min  (3)BMP daily       82yo F hx of breast CA, DM, HTN, HLD, CHF, chronic RUE lymphedema comes to ED 1/8/22 for progressive L shoulder pain x weeks and concern for fluid overload due to ADHF, RUE redness concerning for cellulitis.      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 At/near baseline GFR  (2)Lytes - grossly acceptable  (3)ID - RUE cellulitis    ?frozen shoulder    RECOMMEND:  (1)Seems euvolemic at present;  Not on IVF or diuretics (x aldactone)  (2)Abx for GFR 30-40ml/min;  IV abx   (3)Tylenol for shoulder pain;  OT for shoulder exercises?    Sayed [facility]   [employer]   [phone]

## 2022-01-11 NOTE — OCCUPATIONAL THERAPY INITIAL EVALUATION ADULT - LIVES WITH, PROFILE
pt lives alone in a private house. pt owns shower chair and receives assistance from daughter to complete ADLs and functional tasks but daughter does not live with her/alone

## 2022-01-12 LAB
ALBUMIN SERPL ELPH-MCNC: 3.8 G/DL — SIGNIFICANT CHANGE UP (ref 3.3–5)
ALP SERPL-CCNC: 85 U/L — SIGNIFICANT CHANGE UP (ref 40–120)
ALT FLD-CCNC: 5 U/L — LOW (ref 10–45)
ANION GAP SERPL CALC-SCNC: 14 MMOL/L — SIGNIFICANT CHANGE UP (ref 5–17)
ANION GAP SERPL CALC-SCNC: 14 MMOL/L — SIGNIFICANT CHANGE UP (ref 5–17)
AST SERPL-CCNC: 11 U/L — SIGNIFICANT CHANGE UP (ref 10–40)
BASOPHILS # BLD AUTO: 0.04 K/UL — SIGNIFICANT CHANGE UP (ref 0–0.2)
BASOPHILS NFR BLD AUTO: 0.5 % — SIGNIFICANT CHANGE UP (ref 0–2)
BILIRUB SERPL-MCNC: 0.2 MG/DL — SIGNIFICANT CHANGE UP (ref 0.2–1.2)
BUN SERPL-MCNC: 42 MG/DL — HIGH (ref 7–23)
BUN SERPL-MCNC: 43 MG/DL — HIGH (ref 7–23)
CALCIUM SERPL-MCNC: 9.5 MG/DL — SIGNIFICANT CHANGE UP (ref 8.4–10.5)
CALCIUM SERPL-MCNC: 9.6 MG/DL — SIGNIFICANT CHANGE UP (ref 8.4–10.5)
CHLORIDE SERPL-SCNC: 96 MMOL/L — SIGNIFICANT CHANGE UP (ref 96–108)
CHLORIDE SERPL-SCNC: 97 MMOL/L — SIGNIFICANT CHANGE UP (ref 96–108)
CO2 SERPL-SCNC: 26 MMOL/L — SIGNIFICANT CHANGE UP (ref 22–31)
CO2 SERPL-SCNC: 27 MMOL/L — SIGNIFICANT CHANGE UP (ref 22–31)
CREAT SERPL-MCNC: 1.65 MG/DL — HIGH (ref 0.5–1.3)
CREAT SERPL-MCNC: 1.69 MG/DL — HIGH (ref 0.5–1.3)
EOSINOPHIL # BLD AUTO: 0.23 K/UL — SIGNIFICANT CHANGE UP (ref 0–0.5)
EOSINOPHIL NFR BLD AUTO: 3.2 % — SIGNIFICANT CHANGE UP (ref 0–6)
GLUCOSE BLDC GLUCOMTR-MCNC: 228 MG/DL — HIGH (ref 70–99)
GLUCOSE BLDC GLUCOMTR-MCNC: 244 MG/DL — HIGH (ref 70–99)
GLUCOSE BLDC GLUCOMTR-MCNC: 248 MG/DL — HIGH (ref 70–99)
GLUCOSE BLDC GLUCOMTR-MCNC: 313 MG/DL — HIGH (ref 70–99)
GLUCOSE BLDC GLUCOMTR-MCNC: 365 MG/DL — HIGH (ref 70–99)
GLUCOSE SERPL-MCNC: 347 MG/DL — HIGH (ref 70–99)
GLUCOSE SERPL-MCNC: 349 MG/DL — HIGH (ref 70–99)
HCT VFR BLD CALC: 40.3 % — SIGNIFICANT CHANGE UP (ref 34.5–45)
HGB BLD-MCNC: 11.5 G/DL — SIGNIFICANT CHANGE UP (ref 11.5–15.5)
IMM GRANULOCYTES NFR BLD AUTO: 0.3 % — SIGNIFICANT CHANGE UP (ref 0–1.5)
LYMPHOCYTES # BLD AUTO: 1.14 K/UL — SIGNIFICANT CHANGE UP (ref 1–3.3)
LYMPHOCYTES # BLD AUTO: 15.7 % — SIGNIFICANT CHANGE UP (ref 13–44)
MAGNESIUM SERPL-MCNC: 2 MG/DL — SIGNIFICANT CHANGE UP (ref 1.6–2.6)
MCHC RBC-ENTMCNC: 26.2 PG — LOW (ref 27–34)
MCHC RBC-ENTMCNC: 28.5 GM/DL — LOW (ref 32–36)
MCV RBC AUTO: 91.8 FL — SIGNIFICANT CHANGE UP (ref 80–100)
MONOCYTES # BLD AUTO: 0.61 K/UL — SIGNIFICANT CHANGE UP (ref 0–0.9)
MONOCYTES NFR BLD AUTO: 8.4 % — SIGNIFICANT CHANGE UP (ref 2–14)
NEUTROPHILS # BLD AUTO: 5.24 K/UL — SIGNIFICANT CHANGE UP (ref 1.8–7.4)
NEUTROPHILS NFR BLD AUTO: 71.9 % — SIGNIFICANT CHANGE UP (ref 43–77)
NRBC # BLD: 0 /100 WBCS — SIGNIFICANT CHANGE UP (ref 0–0)
PHOSPHATE SERPL-MCNC: 2.8 MG/DL — SIGNIFICANT CHANGE UP (ref 2.5–4.5)
PLATELET # BLD AUTO: 317 K/UL — SIGNIFICANT CHANGE UP (ref 150–400)
POTASSIUM SERPL-MCNC: 3.9 MMOL/L — SIGNIFICANT CHANGE UP (ref 3.5–5.3)
POTASSIUM SERPL-MCNC: 4 MMOL/L — SIGNIFICANT CHANGE UP (ref 3.5–5.3)
POTASSIUM SERPL-SCNC: 3.9 MMOL/L — SIGNIFICANT CHANGE UP (ref 3.5–5.3)
POTASSIUM SERPL-SCNC: 4 MMOL/L — SIGNIFICANT CHANGE UP (ref 3.5–5.3)
PROT SERPL-MCNC: 7.2 G/DL — SIGNIFICANT CHANGE UP (ref 6–8.3)
RBC # BLD: 4.39 M/UL — SIGNIFICANT CHANGE UP (ref 3.8–5.2)
RBC # FLD: 17.2 % — HIGH (ref 10.3–14.5)
SODIUM SERPL-SCNC: 137 MMOL/L — SIGNIFICANT CHANGE UP (ref 135–145)
SODIUM SERPL-SCNC: 137 MMOL/L — SIGNIFICANT CHANGE UP (ref 135–145)
WBC # BLD: 7.28 K/UL — SIGNIFICANT CHANGE UP (ref 3.8–10.5)
WBC # FLD AUTO: 7.28 K/UL — SIGNIFICANT CHANGE UP (ref 3.8–10.5)

## 2022-01-12 PROCEDURE — 99232 SBSQ HOSP IP/OBS MODERATE 35: CPT

## 2022-01-12 RX ORDER — INSULIN GLARGINE 100 [IU]/ML
6 INJECTION, SOLUTION SUBCUTANEOUS AT BEDTIME
Refills: 0 | Status: DISCONTINUED | OUTPATIENT
Start: 2022-01-12 | End: 2022-01-13

## 2022-01-12 RX ORDER — LIDOCAINE 4 G/100G
1 CREAM TOPICAL EVERY 24 HOURS
Refills: 0 | Status: DISCONTINUED | OUTPATIENT
Start: 2022-01-12 | End: 2022-01-13

## 2022-01-12 RX ADMIN — Medication 650 MILLIGRAM(S): at 22:19

## 2022-01-12 RX ADMIN — Medication 4: at 12:40

## 2022-01-12 RX ADMIN — Medication 2: at 18:02

## 2022-01-12 RX ADMIN — FAMOTIDINE 20 MILLIGRAM(S): 10 INJECTION INTRAVENOUS at 11:49

## 2022-01-12 RX ADMIN — GABAPENTIN 300 MILLIGRAM(S): 400 CAPSULE ORAL at 18:02

## 2022-01-12 RX ADMIN — BUMETANIDE 1 MILLIGRAM(S): 0.25 INJECTION INTRAMUSCULAR; INTRAVENOUS at 18:02

## 2022-01-12 RX ADMIN — SENNA PLUS 2 TABLET(S): 8.6 TABLET ORAL at 21:35

## 2022-01-12 RX ADMIN — LIDOCAINE 1 PATCH: 4 CREAM TOPICAL at 18:03

## 2022-01-12 RX ADMIN — APIXABAN 2.5 MILLIGRAM(S): 2.5 TABLET, FILM COATED ORAL at 06:30

## 2022-01-12 RX ADMIN — SPIRONOLACTONE 12.5 MILLIGRAM(S): 25 TABLET, FILM COATED ORAL at 06:32

## 2022-01-12 RX ADMIN — Medication 2: at 09:13

## 2022-01-12 RX ADMIN — Medication 150 MILLIGRAM(S): at 06:31

## 2022-01-12 RX ADMIN — APIXABAN 2.5 MILLIGRAM(S): 2.5 TABLET, FILM COATED ORAL at 18:02

## 2022-01-12 RX ADMIN — Medication 650 MILLIGRAM(S): at 21:34

## 2022-01-12 RX ADMIN — INSULIN GLARGINE 6 UNIT(S): 100 INJECTION, SOLUTION SUBCUTANEOUS at 21:34

## 2022-01-12 RX ADMIN — AMLODIPINE BESYLATE 5 MILLIGRAM(S): 2.5 TABLET ORAL at 06:31

## 2022-01-12 RX ADMIN — Medication 81 MILLIGRAM(S): at 11:49

## 2022-01-12 RX ADMIN — LIDOCAINE 1 PATCH: 4 CREAM TOPICAL at 20:29

## 2022-01-12 RX ADMIN — Medication 100 MILLIGRAM(S): at 06:30

## 2022-01-12 RX ADMIN — Medication 650 MILLIGRAM(S): at 06:32

## 2022-01-12 RX ADMIN — GABAPENTIN 300 MILLIGRAM(S): 400 CAPSULE ORAL at 06:31

## 2022-01-12 RX ADMIN — Medication 100 MILLIGRAM(S): at 18:06

## 2022-01-12 RX ADMIN — ATORVASTATIN CALCIUM 40 MILLIGRAM(S): 80 TABLET, FILM COATED ORAL at 21:36

## 2022-01-12 RX ADMIN — BUMETANIDE 1 MILLIGRAM(S): 0.25 INJECTION INTRAMUSCULAR; INTRAVENOUS at 06:30

## 2022-01-12 NOTE — PHYSICAL THERAPY INITIAL EVALUATION ADULT - ADDITIONAL COMMENTS
Patient reports she lives alone in private home, she resides on first floor, ambulates with rolling walker, uses a shower chair, daughter assists as needed.

## 2022-01-12 NOTE — PHYSICAL THERAPY INITIAL EVALUATION ADULT - PERTINENT HX OF CURRENT PROBLEM, REHAB EVAL
82yo F hx of breast CA, DM, HTN, HLD, CHF, chronic RUE lymphedema comes to ED 1/8/22 for progressive L shoulder pain x weeks and concern for fluid overload due to ADHF, RUE redness concerning for cellulitis. 1/7/21: Left shoulder xray: (-) fx or dislocation, 1/8/21: R LE US:  (-) DVT,  R UE US: (-) DVT

## 2022-01-12 NOTE — PROGRESS NOTE ADULT - SUBJECTIVE AND OBJECTIVE BOX
[facility] [phone]        Patient seen and examined in bed.  She was using her shoulder more today and less pain         MEDICATIONS  (STANDING):  amLODIPine   Tablet 5 milliGRAM(s) Oral daily  apixaban 2.5 milliGRAM(s) Oral every 12 hours  aspirin  chewable 81 milliGRAM(s) Oral daily  atorvastatin 40 milliGRAM(s) Oral at bedtime  buMETAnide 1 milliGRAM(s) Oral two times a day  ceFAZolin   IVPB 2000 milliGRAM(s) IV Intermittent every 12 hours  dextrose 40% Gel 15 Gram(s) Oral once  dextrose 5%. 1000 milliLiter(s) (50 mL/Hr) IV Continuous <Continuous>  dextrose 5%. 1000 milliLiter(s) (100 mL/Hr) IV Continuous <Continuous>  dextrose 50% Injectable 25 Gram(s) IV Push once  dextrose 50% Injectable 12.5 Gram(s) IV Push once  dextrose 50% Injectable 25 Gram(s) IV Push once  famotidine    Tablet 20 milliGRAM(s) Oral daily  gabapentin 300 milliGRAM(s) Oral two times a day  glucagon  Injectable 1 milliGRAM(s) IntraMuscular once  insulin glargine Injectable (LANTUS) 3 Unit(s) SubCutaneous at bedtime  insulin lispro (ADMELOG) corrective regimen sliding scale   SubCutaneous three times a day before meals  insulin lispro (ADMELOG) corrective regimen sliding scale   SubCutaneous at bedtime  metoprolol succinate  milliGRAM(s) Oral daily  senna 2 Tablet(s) Oral at bedtime  spironolactone 12.5 milliGRAM(s) Oral daily      VITAL:  T(C): , Max: 36.5 (01-11-22 @ 20:46)  T(F): , Max: 97.7 (01-11-22 @ 20:46)  HR: 66 (01-12-22 @ 04:42)  BP: 132/79 (01-12-22 @ 04:42)  BP(mean): --  RR: 18 (01-12-22 @ 04:42)  SpO2: 92% (01-12-22 @ 04:42)  Wt(kg): --    I and O's:    01-11 @ 07:01  -  01-12 @ 07:00  --------------------------------------------------------  IN: 120 mL / OUT: 150 mL / NET: -30 mL          PHYSICAL EXAM:    Constitutional: NAD  Neck:  No JVD  Respiratory: CTAB/L  Cardiovascular: S1 and S2  Gastrointestinal: BS+, soft, NT/ND  Extremities: No peripheral edema  Neurological: A/O x 3, no focal deficits  Psychiatric: Normal mood, normal affect  : No Dowling  Skin: No rashes  Access: Not applicable    LABS:                        10.5   6.88  )-----------( 288      ( 11 Jan 2022 05:44 )             35.6     01-11    139  |  99  |  41<H>  ----------------------------<  174<H>  3.8   |  27  |  1.77<H>    Ca    9.3      11 Jan 2022 05:44  Phos  3.0     01-11  Mg     2.1     01-11            Urine Studies:          RADIOLOGY & ADDITIONAL STUDIES:

## 2022-01-12 NOTE — PROGRESS NOTE ADULT - SUBJECTIVE AND OBJECTIVE BOX
[facility] Division of Hospital Medicine  Delaney Morataya MD  Pager (M-F, 6C-5V): [phone]  Other Times:  [phone]    Patient is a 81y old  Female who presents with a chief complaint of L shoulder pain (12 Jan 2022 10:48)      SUBJECTIVE / OVERNIGHT EVENTS:  no acute events overnight  English language line interpretor: [id number]  says her left shoulder still hurts a lot 8/10    ADDITIONAL REVIEW OF SYSTEMS:    MEDICATIONS  (STANDING):  amLODIPine   Tablet 5 milliGRAM(s) Oral daily  apixaban 2.5 milliGRAM(s) Oral every 12 hours  aspirin  chewable 81 milliGRAM(s) Oral daily  atorvastatin 40 milliGRAM(s) Oral at bedtime  buMETAnide 1 milliGRAM(s) Oral two times a day  ceFAZolin   IVPB 2000 milliGRAM(s) IV Intermittent every 12 hours  dextrose 40% Gel 15 Gram(s) Oral once  dextrose 5%. 1000 milliLiter(s) (50 mL/Hr) IV Continuous <Continuous>  dextrose 5%. 1000 milliLiter(s) (100 mL/Hr) IV Continuous <Continuous>  dextrose 50% Injectable 25 Gram(s) IV Push once  dextrose 50% Injectable 12.5 Gram(s) IV Push once  dextrose 50% Injectable 25 Gram(s) IV Push once  famotidine    Tablet 20 milliGRAM(s) Oral daily  gabapentin 300 milliGRAM(s) Oral two times a day  glucagon  Injectable 1 milliGRAM(s) IntraMuscular once  insulin glargine Injectable (LANTUS) 6 Unit(s) SubCutaneous at bedtime  insulin lispro (ADMELOG) corrective regimen sliding scale   SubCutaneous three times a day before meals  insulin lispro (ADMELOG) corrective regimen sliding scale   SubCutaneous at bedtime  lidocaine   4% Patch 1 Patch Transdermal every 24 hours  metoprolol succinate  milliGRAM(s) Oral daily  senna 2 Tablet(s) Oral at bedtime  spironolactone 12.5 milliGRAM(s) Oral daily    MEDICATIONS  (PRN):  acetaminophen     Tablet .. 650 milliGRAM(s) Oral every 6 hours PRN Temp greater or equal to 38C (100.4F), Mild Pain (1 - 3)  aluminum hydroxide/magnesium hydroxide/simethicone Suspension 30 milliLiter(s) Oral every 4 hours PRN Dyspepsia  melatonin 3 milliGRAM(s) Oral at bedtime PRN Insomnia  ondansetron Injectable 4 milliGRAM(s) IV Push every 8 hours PRN Nausea and/or Vomiting  polyethylene glycol 3350 17 Gram(s) Oral at bedtime PRN Constipation  traMADol 25 milliGRAM(s) Oral three times a day PRN Severe Pain (7 - 10)      CAPILLARY BLOOD GLUCOSE      POCT Blood Glucose.: 313 mg/dL (12 Jan 2022 12:34)  POCT Blood Glucose.: 365 mg/dL (12 Jan 2022 12:33)  POCT Blood Glucose.: 228 mg/dL (12 Jan 2022 08:35)  POCT Blood Glucose.: 218 mg/dL (11 Jan 2022 22:10)  POCT Blood Glucose.: 236 mg/dL (11 Jan 2022 17:15)    I&O's Summary    11 Jan 2022 07:01  -  12 Jan 2022 07:00  --------------------------------------------------------  IN: 120 mL / OUT: 150 mL / NET: -30 mL        PHYSICAL EXAM:  Vital Signs Last 24 Hrs  T(C): 36.4 (12 Jan 2022 12:09), Max: 36.5 (11 Jan 2022 20:46)  T(F): 97.5 (12 Jan 2022 12:09), Max: 97.7 (11 Jan 2022 20:46)  HR: 66 (12 Jan 2022 12:09) (66 - 70)  BP: 130/79 (12 Jan 2022 12:09) (120/69 - 132/79)  BP(mean): --  RR: 18 (12 Jan 2022 12:09) (18 - 18)  SpO2: 94% (12 Jan 2022 12:09) (92% - 95%)  GENERAL: NAD, breathing normal  HEAD:  Atraumatic, Normocephalic  EYES: conjunctiva and sclera clear  NECK: supple, No JVD  CHEST/LUNG: CTA b/l  HEART: S1 S2 RRR  ABDOMEN: +BS Soft, NT/ND  EXTREMITIES:  2+ DP Pulses, No c/c. RUE with significant edema related to lymphedema, no b/l LE edema  SKIN: no erythema/warmth of either upper extremity noted    LABS:                        11.5   7.28  )-----------( 317      ( 12 Jan 2022 13:01 )             40.3     01-12    137  |  96  |  43<H>  ----------------------------<  347<H>  4.0   |  27  |  1.69<H>    Ca    9.6      12 Jan 2022 13:01  Phos  2.8     01-12  Mg     2.0     01-12    TPro  7.2  /  Alb  3.8  /  TBili  0.2  /  DBili  x   /  AST  11  /  ALT  5<L>  /  AlkPhos  85  01-12                RADIOLOGY & ADDITIONAL TESTS:  Results Reviewed:   Imaging Personally Reviewed:  e< from: TTE with Doppler (w/Cont) (01.11.22 @ 09:58) >  Mitral Valve: Normal mitral valve.  Aortic Valve/Aorta: Calcified aortic valve with normal  opening.  Normal aortic root size.  Left Atrium: Normal left atrium.  Left Ventricle: Endocardial visualization enhanced with  intravenous injection of UltrasonicEnhancing Agent  (Definity).  Normal left ventricular internal dimensions and wall  thicknesses.  Normal left ventricular systolic function. No segmental  wall motion abnormalities.  Normal diastolic function.  Right Heart: Normal right atrium. The right ventricle is  not well visualized.  Normal tricuspid valve. Mild tricuspid regurgitation.  Normal pulmonic valve.  Pericardium/Pleura: Normal pericardium with no pericardial  effusion.  Hemodynamic: Estimated right atrial pressure is normal.  No evidence of pulmonary hypertension.  ------------------------------------------------------------------------  Conclusions:  Endocardial visualization enhanced with intravenous  injection of Ultrasonic Enhancing Agent (Definity).  Normal left ventricular systolic function. No segmental  wall motion abnormalities.  *** Compared with echocardiogram of 3/15/2021, no  significant changes noted.    < end of copied text >    Electrocardiogram Personally Reviewed:    COORDINATION OF CARE:  Care Discussed with Consultants/Other Providers [Y/N]:  Prior or Outpatient Records Reviewed [Y/N]:

## 2022-01-12 NOTE — PROGRESS NOTE ADULT - ASSESSMENT
82yo F hx of breast CA, DM, HTN, HLD, CHF, chronic RUE lymphedema comes to ED 1/8/22 for progressive L shoulder pain x weeks admitted with RUE cellulitis and fluid overload, responded to abx and diuretics, TTE showing normal systolic/diastolic function.

## 2022-01-12 NOTE — PROGRESS NOTE ADULT - ASSESSMENT
82yo F hx of breast CA, DM, HTN, HLD, CHF, chronic RUE lymphedema comes to ED 1/8/22 for progressive L shoulder pain x weeks and concern for fluid overload due to ADHF, RUE redness concerning for cellulitis.      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 At/near baseline GFR  (2)Lytes - grossly acceptable  (3)ID - RUE cellulitis    ?frozen shoulder    RECOMMEND:  (1)Bumex restarted;  Trend creatinine   (2)Abx for GFR 30-40ml/min;  IV abx   (3)Tylenol for shoulder pain;  OT for shoulder exercises but less pain then yesterday     Sayed Sidelines [facility]   [phone]

## 2022-01-13 ENCOUNTER — TRANSCRIPTION ENCOUNTER (OUTPATIENT)
Age: 82
End: 2022-01-13

## 2022-01-13 ENCOUNTER — APPOINTMENT (OUTPATIENT)
Dept: INTERNAL MEDICINE | Facility: CLINIC | Age: 82
End: 2022-01-13

## 2022-01-13 VITALS
HEART RATE: 71 BPM | DIASTOLIC BLOOD PRESSURE: 60 MMHG | SYSTOLIC BLOOD PRESSURE: 122 MMHG | OXYGEN SATURATION: 97 % | RESPIRATION RATE: 18 BRPM | TEMPERATURE: 98 F

## 2022-01-13 DIAGNOSIS — I10 ESSENTIAL (PRIMARY) HYPERTENSION: ICD-10-CM

## 2022-01-13 DIAGNOSIS — M75.52 BURSITIS OF LEFT SHOULDER: ICD-10-CM

## 2022-01-13 DIAGNOSIS — I82.409 ACUTE EMBOLISM AND THROMBOSIS OF UNSPECIFIED DEEP VEINS OF UNSPECIFIED LOWER EXTREMITY: ICD-10-CM

## 2022-01-13 LAB
ALBUMIN SERPL ELPH-MCNC: 3.9 G/DL — SIGNIFICANT CHANGE UP (ref 3.3–5)
ALP SERPL-CCNC: 90 U/L — SIGNIFICANT CHANGE UP (ref 40–120)
ALT FLD-CCNC: <5 U/L — LOW (ref 10–45)
ANION GAP SERPL CALC-SCNC: 14 MMOL/L — SIGNIFICANT CHANGE UP (ref 5–17)
AST SERPL-CCNC: 10 U/L — SIGNIFICANT CHANGE UP (ref 10–40)
BASOPHILS # BLD AUTO: 0.03 K/UL — SIGNIFICANT CHANGE UP (ref 0–0.2)
BASOPHILS NFR BLD AUTO: 0.3 % — SIGNIFICANT CHANGE UP (ref 0–2)
BILIRUB SERPL-MCNC: 0.2 MG/DL — SIGNIFICANT CHANGE UP (ref 0.2–1.2)
BUN SERPL-MCNC: 42 MG/DL — HIGH (ref 7–23)
CALCIUM SERPL-MCNC: 9.6 MG/DL — SIGNIFICANT CHANGE UP (ref 8.4–10.5)
CHLORIDE SERPL-SCNC: 96 MMOL/L — SIGNIFICANT CHANGE UP (ref 96–108)
CO2 SERPL-SCNC: 27 MMOL/L — SIGNIFICANT CHANGE UP (ref 22–31)
CREAT SERPL-MCNC: 1.72 MG/DL — HIGH (ref 0.5–1.3)
EOSINOPHIL # BLD AUTO: 0.1 K/UL — SIGNIFICANT CHANGE UP (ref 0–0.5)
EOSINOPHIL NFR BLD AUTO: 1.2 % — SIGNIFICANT CHANGE UP (ref 0–6)
GLUCOSE BLDC GLUCOMTR-MCNC: 229 MG/DL — HIGH (ref 70–99)
GLUCOSE BLDC GLUCOMTR-MCNC: 290 MG/DL — HIGH (ref 70–99)
GLUCOSE SERPL-MCNC: 193 MG/DL — HIGH (ref 70–99)
HCT VFR BLD CALC: 38.6 % — SIGNIFICANT CHANGE UP (ref 34.5–45)
HGB BLD-MCNC: 11.3 G/DL — LOW (ref 11.5–15.5)
IMM GRANULOCYTES NFR BLD AUTO: 0.5 % — SIGNIFICANT CHANGE UP (ref 0–1.5)
LYMPHOCYTES # BLD AUTO: 1.64 K/UL — SIGNIFICANT CHANGE UP (ref 1–3.3)
LYMPHOCYTES # BLD AUTO: 19.1 % — SIGNIFICANT CHANGE UP (ref 13–44)
MCHC RBC-ENTMCNC: 26.3 PG — LOW (ref 27–34)
MCHC RBC-ENTMCNC: 29.3 GM/DL — LOW (ref 32–36)
MCV RBC AUTO: 90 FL — SIGNIFICANT CHANGE UP (ref 80–100)
MONOCYTES # BLD AUTO: 0.78 K/UL — SIGNIFICANT CHANGE UP (ref 0–0.9)
MONOCYTES NFR BLD AUTO: 9.1 % — SIGNIFICANT CHANGE UP (ref 2–14)
NEUTROPHILS # BLD AUTO: 5.99 K/UL — SIGNIFICANT CHANGE UP (ref 1.8–7.4)
NEUTROPHILS NFR BLD AUTO: 69.8 % — SIGNIFICANT CHANGE UP (ref 43–77)
NRBC # BLD: 0 /100 WBCS — SIGNIFICANT CHANGE UP (ref 0–0)
PLATELET # BLD AUTO: 308 K/UL — SIGNIFICANT CHANGE UP (ref 150–400)
POTASSIUM SERPL-MCNC: 4.2 MMOL/L — SIGNIFICANT CHANGE UP (ref 3.5–5.3)
POTASSIUM SERPL-SCNC: 4.2 MMOL/L — SIGNIFICANT CHANGE UP (ref 3.5–5.3)
PROT SERPL-MCNC: 7.4 G/DL — SIGNIFICANT CHANGE UP (ref 6–8.3)
RBC # BLD: 4.29 M/UL — SIGNIFICANT CHANGE UP (ref 3.8–5.2)
RBC # FLD: 17.1 % — HIGH (ref 10.3–14.5)
SODIUM SERPL-SCNC: 137 MMOL/L — SIGNIFICANT CHANGE UP (ref 135–145)
WBC # BLD: 8.58 K/UL — SIGNIFICANT CHANGE UP (ref 3.8–10.5)
WBC # FLD AUTO: 8.58 K/UL — SIGNIFICANT CHANGE UP (ref 3.8–10.5)

## 2022-01-13 PROCEDURE — C8929: CPT

## 2022-01-13 PROCEDURE — 85730 THROMBOPLASTIN TIME PARTIAL: CPT

## 2022-01-13 PROCEDURE — 97166 OT EVAL MOD COMPLEX 45 MIN: CPT

## 2022-01-13 PROCEDURE — 97161 PT EVAL LOW COMPLEX 20 MIN: CPT

## 2022-01-13 PROCEDURE — 80053 COMPREHEN METABOLIC PANEL: CPT

## 2022-01-13 PROCEDURE — 82435 ASSAY OF BLOOD CHLORIDE: CPT

## 2022-01-13 PROCEDURE — 84295 ASSAY OF SERUM SODIUM: CPT

## 2022-01-13 PROCEDURE — 85027 COMPLETE CBC AUTOMATED: CPT

## 2022-01-13 PROCEDURE — 81001 URINALYSIS AUTO W/SCOPE: CPT

## 2022-01-13 PROCEDURE — 82330 ASSAY OF CALCIUM: CPT

## 2022-01-13 PROCEDURE — 85610 PROTHROMBIN TIME: CPT

## 2022-01-13 PROCEDURE — 93005 ELECTROCARDIOGRAM TRACING: CPT

## 2022-01-13 PROCEDURE — 85025 COMPLETE CBC W/AUTO DIFF WBC: CPT

## 2022-01-13 PROCEDURE — 71045 X-RAY EXAM CHEST 1 VIEW: CPT

## 2022-01-13 PROCEDURE — 85014 HEMATOCRIT: CPT

## 2022-01-13 PROCEDURE — 84145 PROCALCITONIN (PCT): CPT

## 2022-01-13 PROCEDURE — 99239 HOSP IP/OBS DSCHRG MGMT >30: CPT

## 2022-01-13 PROCEDURE — 82962 GLUCOSE BLOOD TEST: CPT

## 2022-01-13 PROCEDURE — 84132 ASSAY OF SERUM POTASSIUM: CPT

## 2022-01-13 PROCEDURE — 84100 ASSAY OF PHOSPHORUS: CPT

## 2022-01-13 PROCEDURE — 85018 HEMOGLOBIN: CPT

## 2022-01-13 PROCEDURE — 99285 EMERGENCY DEPT VISIT HI MDM: CPT | Mod: 25

## 2022-01-13 PROCEDURE — 83605 ASSAY OF LACTIC ACID: CPT

## 2022-01-13 PROCEDURE — 36415 COLL VENOUS BLD VENIPUNCTURE: CPT

## 2022-01-13 PROCEDURE — 73030 X-RAY EXAM OF SHOULDER: CPT

## 2022-01-13 PROCEDURE — U0003: CPT

## 2022-01-13 PROCEDURE — 82947 ASSAY GLUCOSE BLOOD QUANT: CPT

## 2022-01-13 PROCEDURE — 84484 ASSAY OF TROPONIN QUANT: CPT

## 2022-01-13 PROCEDURE — 82803 BLOOD GASES ANY COMBINATION: CPT

## 2022-01-13 PROCEDURE — 93971 EXTREMITY STUDY: CPT

## 2022-01-13 PROCEDURE — 83880 ASSAY OF NATRIURETIC PEPTIDE: CPT

## 2022-01-13 PROCEDURE — 83735 ASSAY OF MAGNESIUM: CPT

## 2022-01-13 PROCEDURE — U0005: CPT

## 2022-01-13 PROCEDURE — 96374 THER/PROPH/DIAG INJ IV PUSH: CPT

## 2022-01-13 PROCEDURE — 83036 HEMOGLOBIN GLYCOSYLATED A1C: CPT

## 2022-01-13 PROCEDURE — 80048 BASIC METABOLIC PNL TOTAL CA: CPT

## 2022-01-13 RX ORDER — ACETAMINOPHEN 500 MG
3 TABLET ORAL
Qty: 0 | Refills: 0 | DISCHARGE

## 2022-01-13 RX ORDER — LIDOCAINE 4 G/100G
1 CREAM TOPICAL
Qty: 7 | Refills: 0
Start: 2022-01-13 | End: 2022-01-19

## 2022-01-13 RX ORDER — ACETAMINOPHEN 500 MG
2 TABLET ORAL
Qty: 0 | Refills: 0 | DISCHARGE
Start: 2022-01-13

## 2022-01-13 RX ORDER — ACETAMINOPHEN 500 MG
650 TABLET ORAL EVERY 6 HOURS
Refills: 0 | Status: DISCONTINUED | OUTPATIENT
Start: 2022-01-13 | End: 2022-01-13

## 2022-01-13 RX ADMIN — GABAPENTIN 300 MILLIGRAM(S): 400 CAPSULE ORAL at 05:57

## 2022-01-13 RX ADMIN — Medication 3: at 13:16

## 2022-01-13 RX ADMIN — LIDOCAINE 1 PATCH: 4 CREAM TOPICAL at 06:23

## 2022-01-13 RX ADMIN — FAMOTIDINE 20 MILLIGRAM(S): 10 INJECTION INTRAVENOUS at 12:23

## 2022-01-13 RX ADMIN — Medication 150 MILLIGRAM(S): at 05:55

## 2022-01-13 RX ADMIN — Medication 81 MILLIGRAM(S): at 12:23

## 2022-01-13 RX ADMIN — Medication 650 MILLIGRAM(S): at 14:07

## 2022-01-13 RX ADMIN — Medication 100 MILLIGRAM(S): at 05:55

## 2022-01-13 RX ADMIN — Medication 2: at 09:34

## 2022-01-13 RX ADMIN — LIDOCAINE 1 PATCH: 4 CREAM TOPICAL at 13:54

## 2022-01-13 RX ADMIN — APIXABAN 2.5 MILLIGRAM(S): 2.5 TABLET, FILM COATED ORAL at 05:56

## 2022-01-13 RX ADMIN — SPIRONOLACTONE 12.5 MILLIGRAM(S): 25 TABLET, FILM COATED ORAL at 05:56

## 2022-01-13 RX ADMIN — BUMETANIDE 1 MILLIGRAM(S): 0.25 INJECTION INTRAMUSCULAR; INTRAVENOUS at 05:56

## 2022-01-13 RX ADMIN — AMLODIPINE BESYLATE 5 MILLIGRAM(S): 2.5 TABLET ORAL at 05:56

## 2022-01-13 NOTE — DISCHARGE NOTE NURSING/CASE MANAGEMENT/SOCIAL WORK - NSDCVIVACCINE_GEN_ALL_CORE_FT
COVID-19, mRNA, LNP-S, PF, 30 mcg/0.3 mL dose (Pf[employer]); 27-Jul-2021 15:42; [place]arien Garcia); [employer]; [id number] (Exp. Date: 31-Aug-2021); IntraMuscular; Deltoid Left.; 0.3 milliLiter(s);

## 2022-01-13 NOTE — PROGRESS NOTE ADULT - PROBLEM SELECTOR PLAN 1
With reported 20-30lb weight gain despite outpatient increase in home diuretic dose, now noted to be mildly hypoxic requiring 1-2 L NC in ED - 93% RA today  - TTE in march 2021 showing grade II diastolic dysfunction, mild/mod TR, severe pHTN  -repeat TTE to assess for interval changes  -with rising creatinine, hold iv bumex and resume po bumex tomorrow  - continue aldactone 12.5mgpo daily  - cont troprol xl 150mg po daily
With reported 20-30lb weight gain despite outpatient increase in home diuretic dose, now noted to be mildly hypoxic requiring 1-2 L NC in ED - 93% RA today  - TTE in march 2021 showing grade II diastolic dysfunction, mild/mod TR, severe pHTN.  Now TTE is normalized but this was done after days of iv diuresis.  -continue po bumex  - continue aldactone 12.5mgpo daily  - cont troprol xl 150mg po daily
With reported 20-30lb weight gain despite outpatient increase in home diuretic dose, now noted to be mildly hypoxic requiring 1-2 L NC in ED - 93% RA today  - TTE in march 2021 showing grade II diastolic dysfunction, mild/mod TR, severe pHTN  - c/w home bumex to 1mg iv BID, monitor strict i/o, daily weights  - continue aldactone 12.5mgpo daily  - cont troprol xl 150mg po daily
With reported 20-30lb weight gain despite outpatient increase in home diuretic dose, now noted to be mildly hypoxic requiring 1-2 L NC in ED - 98% RA today  - TTE in march 2021 showing grade II diastolic dysfunction, mild/mod TR, severe pHTN - no need for repeat TTE at this time as symptoms likely due to cellulitis - monitor volume status.   - c/w home bumex to 1mg iv BID, monitor strict i/o, daily weights  - continue aldactone 12.5mgpo daily  - cont troprol xl 150mg po daily  - wean o2 as tolerated
With reported 20-30lb weight gain despite outpatient increase in home diuretic dose, now noted to be mildly hypoxic requiring 1-2 L NC in ED - 93% RA today  - TTE in march 2021 showing grade II diastolic dysfunction, mild/mod TR, severe pHTN.  Now TTE is normalized but this was done after days of iv diuresis.  -continue po bumex  - continue aldactone 12.5mgpo daily  - cont troprol xl 150mg po daily

## 2022-01-13 NOTE — PROGRESS NOTE ADULT - NSPROGADDITIONALINFOA_GEN_ALL_CORE
dispo: pending clinical progress, response to diuresis, and PT/OT evaluation.
discharge time: 35min.  Reviewed hospital course f/u with patient's daughter Shauna on phone  Reviewed with NITA Claros
updated daughter Shauna  anticipate discharge tomorrow
reviewed with NITA Norris

## 2022-01-13 NOTE — DISCHARGE NOTE PROVIDER - CARE PROVIDER_API CALL
Luciano Dawkins)  Internal Medicine  [address] [address]  Phone: [phone]  Fax: [phone]  Follow Up Time:    Luciano Dawkins)  Internal Medicine  [address] [address]  Phone: [phone]  Fax: [phone]  Follow Up Time:     Ld Hgaan)  Cardiovascular Disease; Nuclear Cardiology  [address] [place] [address], [address]  [place] [address]  Phone: [phone]  Fax: [phone]  Follow Up Time:

## 2022-01-13 NOTE — PROGRESS NOTE ADULT - ASSESSMENT
82yo F hx of breast CA, DM, HTN, HLD, CHF, chronic RUE lymphedema comes to ED 1/8/22 for progressive L shoulder pain x weeks and concern for fluid overload due to ADHF, RUE redness concerning for cellulitis.      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 At/near baseline GFR  (2)Lytes - grossly acceptable  (3)ID - RUE cellulitis    ?frozen shoulder    RECOMMEND:  (1)Bumex restarted;  Trend creatinine but at baseline   (2)Abx for GFR 30-40ml/min;  IV abx   (3)Tylenol for shoulder pain;  OT for shoulder exercises but less pain then yesterday     Follow with Dr Kan on discharge       Sayed [facility]   [phone]

## 2022-01-13 NOTE — PROGRESS NOTE ADULT - SUBJECTIVE AND OBJECTIVE BOX
[facility] Division of Hospital Medicine  Delaney Morataya MD  Pager (M-F, 8A-5P): [phone]  Other Times:  [phone]    Patient is a 81y old  Female who presents with a chief complaint of L shoulder pain (13 Jan 2022 12:42)      SUBJECTIVE / OVERNIGHT EVENTS:  no acute events overnight  Filipino interpretor: [id number]  still with left shoulder pain.  No meds taken today.    has ROM with shoulder but notes some pain with movement  no sob    ADDITIONAL REVIEW OF SYSTEMS:    MEDICATIONS  (STANDING):  acetaminophen     Tablet .. 650 milliGRAM(s) Oral every 6 hours  amLODIPine   Tablet 5 milliGRAM(s) Oral daily  apixaban 2.5 milliGRAM(s) Oral every 12 hours  aspirin  chewable 81 milliGRAM(s) Oral daily  atorvastatin 40 milliGRAM(s) Oral at bedtime  buMETAnide 1 milliGRAM(s) Oral two times a day  dextrose 40% Gel 15 Gram(s) Oral once  dextrose 5%. 1000 milliLiter(s) (50 mL/Hr) IV Continuous <Continuous>  dextrose 5%. 1000 milliLiter(s) (100 mL/Hr) IV Continuous <Continuous>  dextrose 50% Injectable 25 Gram(s) IV Push once  dextrose 50% Injectable 12.5 Gram(s) IV Push once  dextrose 50% Injectable 25 Gram(s) IV Push once  famotidine    Tablet 20 milliGRAM(s) Oral daily  gabapentin 300 milliGRAM(s) Oral two times a day  glucagon  Injectable 1 milliGRAM(s) IntraMuscular once  insulin glargine Injectable (LANTUS) 6 Unit(s) SubCutaneous at bedtime  insulin lispro (ADMELOG) corrective regimen sliding scale   SubCutaneous three times a day before meals  insulin lispro (ADMELOG) corrective regimen sliding scale   SubCutaneous at bedtime  lidocaine   4% Patch 1 Patch Transdermal every 24 hours  metoprolol succinate  milliGRAM(s) Oral daily  senna 2 Tablet(s) Oral at bedtime  spironolactone 12.5 milliGRAM(s) Oral daily    MEDICATIONS  (PRN):  aluminum hydroxide/magnesium hydroxide/simethicone Suspension 30 milliLiter(s) Oral every 4 hours PRN Dyspepsia  melatonin 3 milliGRAM(s) Oral at bedtime PRN Insomnia  ondansetron Injectable 4 milliGRAM(s) IV Push every 8 hours PRN Nausea and/or Vomiting  polyethylene glycol 3350 17 Gram(s) Oral at bedtime PRN Constipation  traMADol 25 milliGRAM(s) Oral three times a day PRN Severe Pain (7 - 10)      CAPILLARY BLOOD GLUCOSE      POCT Blood Glucose.: 290 mg/dL (13 Jan 2022 12:37)  POCT Blood Glucose.: 229 mg/dL (13 Jan 2022 09:13)  POCT Blood Glucose.: 248 mg/dL (12 Jan 2022 21:17)  POCT Blood Glucose.: 244 mg/dL (12 Jan 2022 17:29)    I&O's Summary    12 Jan 2022 07:01  -  13 Jan 2022 07:00  --------------------------------------------------------  IN: 920 mL / OUT: 1500 mL / NET: -580 mL        PHYSICAL EXAM:  Vital Signs Last 24 Hrs  T(C): 36.4 (13 Jan 2022 12:07), Max: 36.6 (12 Jan 2022 21:26)  T(F): 97.6 (13 Jan 2022 12:07), Max: 97.9 (12 Jan 2022 21:26)  HR: 71 (13 Jan 2022 12:07) (69 - 75)  BP: 122/60 (13 Jan 2022 12:07) (122/60 - 148/67)  BP(mean): --  RR: 18 (13 Jan 2022 12:07) (18 - 18)  SpO2: 97% (13 Jan 2022 12:07) (96% - 97%)  GENERAL: NAD, breathing normal  HEAD:  Atraumatic, Normocephalic  EYES: conjunctiva and sclera clear  NECK: supple, No JVD  CHEST/LUNG: CTA b/l  HEART: S1 S2 RRR  ABDOMEN: +BS Soft, NT/ND  EXTREMITIES:  2+ DP Pulses, No c/c. RUE with significant edema related to lymphedema, no b/l LE edema  SKIN: no erythema/warmth of either upper extremity noted  MSK: LUE: has FROM but some pain at maximal extension/flexion/rotation    LABS:                        11.3   8.58  )-----------( 308      ( 13 Jan 2022 07:22 )             38.6     01-13    137  |  96  |  42<H>  ----------------------------<  193<H>  4.2   |  27  |  1.72<H>    Ca    9.6      13 Jan 2022 07:19  Phos  2.8     01-12  Mg     2.0     01-12    TPro  7.4  /  Alb  3.9  /  TBili  0.2  /  DBili  x   /  AST  10  /  ALT  <5<L>  /  AlkPhos  90  01-13                RADIOLOGY & ADDITIONAL TESTS:  Results Reviewed:   Imaging Personally Reviewed:  Electrocardiogram Personally Reviewed:    COORDINATION OF CARE:  Care Discussed with Consultants/Other Providers [Y/N]:  Prior or Outpatient Records Reviewed [Y/N]:

## 2022-01-13 NOTE — DISCHARGE NOTE PROVIDER - HOSPITAL COURSE
80yo F hx of breast CA, DM, HTN, HLD, CHF, chronic RUE lymphedema comes to ED for progressive L shoulder pain x weeks, worse with movement, better with rest, minimal relief with OTC pain medications. No trauma or injury noted. Per daughter, has also noted patient has become more swollen and gained approx 20-30 lb weight since discharge from rehab after R hip replacement during the summer. Was told to recently increase home bumex dose to BID but no appreciable change. She also notes RUE has become red, and so has distal RLE. Pt has not complained of pain in these regions.  She was seen by Renal and PT/OT. She completed the ABs today and she is hemodynamically stable to be discharged home today, spoke to Attending and daughter was called.  82yo F hx of breast CA, DM, HTN, HLD, CHF, chronic RUE lymphedema comes to ED 1/8/22 for progressive L shoulder pain x weeks admitted with RUE cellulitis and fluid overload, responded to abx and diuretics, TTE showing normal systolic/diastolic function for d/c today with outpatient f/u of likely shoulder arthritis.  For her acute on chronic decompensated diastolic heart failure, with reported 20-30lb weight gain despite outpatient increase in home diuretic dose, now noted to be mildly hypoxic requiring 1-2 L NC in ED and prior TTE in march 2021 showing grade II diastolic dysfunction, mild/mod TR, severe pHTN she responded to IV diuresis.  Now TTE is normalized but this was done after days of iv diuresis.  Discharge on home diuretics.    Cellulitis responded to 5 days of ancef.  RUE and RLE doppler negative for DVT.     For her Left shoulder pain, Xray negative for fracture or dislocation- calcific tendinosis noted. Likely 2/2 arthritis vs. rotator cuff tendinitis. Tylenol and lidocaine patch prn.  OT recommends home OT.  Recommended outpatient physiatry, heating pad, consider acupuncture.    Her DM2 with a1c 8 is uncontrolled.  CKD3 is at baseline on discharge.

## 2022-01-13 NOTE — PATIENT PROFILE ADULT - FALL HARM RISK - HARM RISK INTERVENTIONS

## 2022-01-13 NOTE — PROGRESS NOTE ADULT - SUBJECTIVE AND OBJECTIVE BOX
NEPHROLOGY[facility] [phone]        Patient seen and examined in bed.  She was feeling better and less pain in the shoulders         MEDICATIONS  (STANDING):  amLODIPine   Tablet 5 milliGRAM(s) Oral daily  apixaban 2.5 milliGRAM(s) Oral every 12 hours  aspirin  chewable 81 milliGRAM(s) Oral daily  atorvastatin 40 milliGRAM(s) Oral at bedtime  buMETAnide 1 milliGRAM(s) Oral two times a day  ceFAZolin   IVPB 2000 milliGRAM(s) IV Intermittent every 12 hours  dextrose 40% Gel 15 Gram(s) Oral once  dextrose 5%. 1000 milliLiter(s) (50 mL/Hr) IV Continuous <Continuous>  dextrose 5%. 1000 milliLiter(s) (100 mL/Hr) IV Continuous <Continuous>  dextrose 50% Injectable 25 Gram(s) IV Push once  dextrose 50% Injectable 12.5 Gram(s) IV Push once  dextrose 50% Injectable 25 Gram(s) IV Push once  famotidine    Tablet 20 milliGRAM(s) Oral daily  gabapentin 300 milliGRAM(s) Oral two times a day  glucagon  Injectable 1 milliGRAM(s) IntraMuscular once  insulin glargine Injectable (LANTUS) 6 Unit(s) SubCutaneous at bedtime  insulin lispro (ADMELOG) corrective regimen sliding scale   SubCutaneous three times a day before meals  insulin lispro (ADMELOG) corrective regimen sliding scale   SubCutaneous at bedtime  lidocaine   4% Patch 1 Patch Transdermal every 24 hours  metoprolol succinate  milliGRAM(s) Oral daily  senna 2 Tablet(s) Oral at bedtime  spironolactone 12.5 milliGRAM(s) Oral daily      VITAL:  T(C): , Max: 36.6 (01-12-22 @ 21:26)  T(F): , Max: 97.9 (01-12-22 @ 21:26)  HR: 69 (01-13-22 @ 04:33)  BP: 131/75 (01-13-22 @ 04:33)  BP(mean): --  RR: 18 (01-13-22 @ 04:33)  SpO2: 97% (01-13-22 @ 04:33)  Wt(kg): --    I and O's:    01-12 @ 07:01  -  01-13 @ 07:00  --------------------------------------------------------  IN: 920 mL / OUT: 1500 mL / NET: -580 mL          PHYSICAL EXAM:    Constitutional: NAD  Neck:  No JVD  Respiratory: CTAB/L  Cardiovascular: S1 and S2  Gastrointestinal: BS+, soft, NT/ND  Extremities: No peripheral edema  Neurological: A/O x 3, no focal deficits  Psychiatric: Normal mood, normal affect  : No Dowling  Skin: No rashes  Access: Not applicable    LABS:                        11.3   8.58  )-----------( 308      ( 13 Jan 2022 07:22 )             38.6     01-13    137  |  96  |  42<H>  ----------------------------<  193<H>  4.2   |  27  |  1.72<H>    Ca    9.6      13 Jan 2022 07:19  Phos  2.8     01-12  Mg     2.0     01-12    TPro  7.4  /  Alb  3.9  /  TBili  0.2  /  DBili  x   /  AST  10  /  ALT  <5<L>  /  AlkPhos  90  01-13          Urine Studies:          RADIOLOGY & ADDITIONAL STUDIES:

## 2022-01-13 NOTE — PROGRESS NOTE ADULT - REASON FOR ADMISSION
L shoulder pain

## 2022-01-13 NOTE — DISCHARGE NOTE PROVIDER - NSDCCPCAREPLAN_GEN_ALL_CORE_FT
PRINCIPAL DISCHARGE DIAGNOSIS  Diagnosis: Acute decompensated heart failure  Assessment and Plan of Treatment: stable, cont current diuretics as ordered, follow up with Cardiologist and PCP in 2-3 weeks      SECONDARY DISCHARGE DIAGNOSES  Diagnosis: HTN (hypertension)  Assessment and Plan of Treatment: controlled, cont current home meds    Diagnosis: DVT, lower extremity  Assessment and Plan of Treatment: stable, cont Eliquis    Diagnosis: DM (diabetes mellitus)  Assessment and Plan of Treatment: uncontrolled, cont current home oral antidiabetic medications, follow up with PCP in 2-3 weeks    Diagnosis: CKD (chronic kidney disease), stage III  Assessment and Plan of Treatment: stable    Diagnosis: Cellulitis  Assessment and Plan of Treatment: resolved    Diagnosis: Bursitis of left shoulder  Assessment and Plan of Treatment: stable, use Tylenol, Lidocaine patch as needed, heating pad, follow up with your Orthopedic doctor, outpatient Physiatry

## 2022-01-13 NOTE — PROGRESS NOTE ADULT - PROBLEM SELECTOR PLAN 3
Xray negative for fracture or dislocation- calcific tendinosis noted. Likely 2/2 arthritis vs. rotator cuff tendinitis  - tylenol PRN mild pain  - tramadol 25mg po tid prn severe pain x 7 day max  -lidocaine patch  - OT: home OT
Xray negative for fracture or dislocation- calcific tendinosis noted. Likely 2/2 arthritis vs. rotator cuff tendinitis  - tylenol PRN mild pain  - tramadol 25mg po tid prn severe pain x 7 day max  - OT: home OT
Xray negative for fracture or dislocation- calcific tendinosis noted. Likely 2/2 arthritis vs. rotator cuff tendinitis  - tylenol PRN mild pain  - tramadol 25mg po tid prn severe pain x 7 day max  - PT evaluation
Xray negative for fracture or dislocation- calcific tendinosis noted. Likely 2/2 arthritis vs. rotator cuff tendinitis  - tylenol PRN mild pain  - tramadol 25mg po tid prn severe pain x 7 day max  - PT evaluation
Xray negative for fracture or dislocation- calcific tendinosis noted. Likely 2/2 arthritis vs. rotator cuff tendinitis  - tylenol standing as she is not getting this regularly  -lidocaine patch  - OT: home OT  -recommended outpatient physiatry, heating pad, consider acupuncture

## 2022-01-13 NOTE — PROGRESS NOTE ADULT - PROBLEM SELECTOR PLAN 4
a1c 8.0  on glimeperide, januvia outpatient   monitor FS   ISS for coverage   increase lantus to 6u qhs
a1c 8.0  on glimeperide, januvia outpatient   monitor FS   ISS for coverage   may need to add low dose lantus
a1c 8.0  on glimeperide, januvia outpatient   resume home meds on d/c
a1c 8.0  on glimeperide, januvia outpatient   monitor FS   ISS for coverage   may need to add low dose lantus
a1c 8.0  on glimeperide, januvia outpatient   monitor FS   ISS for coverage   will add low dose lantus

## 2022-01-13 NOTE — PROGRESS NOTE ADULT - ASSESSMENT
82yo F hx of breast CA, DM, HTN, HLD, CHF, chronic RUE lymphedema comes to ED 1/8/22 for progressive L shoulder pain x weeks admitted with RUE cellulitis and fluid overload, responded to abx and diuretics, TTE showing normal systolic/diastolic function for d/c today with outpatient f/u of likely shoulder arthritis.

## 2022-01-13 NOTE — PROGRESS NOTE ADULT - PROBLEM SELECTOR PLAN 5
Baseline Cr between 1.5-1.8, appears to be at baseline now  - continue monitoring while on diuresis  - renally dose medications  - strict i/o
Baseline Cr between 1.5-1.8, appears to be at baseline now  - continue monitoring while on diuresis  - renally dose medications  - strict i/o      dispo: pending clinical progress, response to diuresis, weaning o2, dopplers, final abx plan and PT evaluation.
Baseline Cr between 1.5-1.8, appears to be at baseline now  - continue monitoring while on diuresis  - renally dose medications  - strict i/o

## 2022-01-13 NOTE — DISCHARGE NOTE NURSING/CASE MANAGEMENT/SOCIAL WORK - PATIENT PORTAL LINK FT
You can access the FollowMyHealth Patient Portal offered by [facility] by registering at the following website: [email or web address]. By joining [employer]’s FollowMyHealth portal, you will also be able to view your health information using other applications (apps) compatible with our system.

## 2022-01-13 NOTE — DISCHARGE NOTE PROVIDER - CARE PROVIDERS DIRECT ADDRESSES
[email or web address]

## 2022-01-13 NOTE — DISCHARGE NOTE PROVIDER - PROVIDER TOKENS
PROVIDER:[TOKEN:[[id number]]] PROVIDER:[TOKEN:[[id number]]],PROVIDER:[TOKEN:[[id number]]]

## 2022-01-13 NOTE — DISCHARGE NOTE PROVIDER - NSDCMRMEDTOKEN_GEN_ALL_CORE_FT
acetaminophen 325 mg oral tablet: 3 tab(s) orally every 8 hours, As Needed  amLODIPine 5 mg oral tablet: 1 tab(s) orally once a day  apixaban 2.5 mg oral tablet: 1 tab(s) orally 2 times a day  aspirin 81 mg oral tablet, chewable: 1 tab(s) orally once a day  atorvastatin 40 mg oral tablet: 1 tab(s) orally once a day (at bedtime)  bumetanide 1 mg oral tablet: 1 tab(s) orally 2 times a day  famotidine 20 mg oral tablet: 1 tab(s) orally once a day (at bedtime)  glimepiride 2 mg oral tablet: 1 tab(s) orally once a day  Gralise 600 mg/24 hours oral tablet, extended release: 1 tab(s) orally once a day  Januvia 25 mg oral tablet: 1 tab(s) orally once a day  polyethylene glycol 3350 oral powder for reconstitution: 17 gram(s) orally once a day (at bedtime), As Needed  senna oral tablet: 2 tab(s) orally once a day (at bedtime)  spironolactone 25 mg oral tablet: 0.5 tab(s) orally once a day  Toprol- mg oral tablet, extended release: 1.5 tab(s) orally once a day  Vitamin B-12: 1 tab(s) orally 3 times a week  Vitamin D3: 1 tab(s) orally once a day   acetaminophen 325 mg oral tablet: 2 tab(s) orally every 6 hours  amLODIPine 5 mg oral tablet: 1 tab(s) orally once a day  apixaban 2.5 mg oral tablet: 1 tab(s) orally 2 times a day  aspirin 81 mg oral tablet, chewable: 1 tab(s) orally once a day  atorvastatin 40 mg oral tablet: 1 tab(s) orally once a day (at bedtime)  bumetanide 1 mg oral tablet: 1 tab(s) orally 2 times a day  famotidine 20 mg oral tablet: 1 tab(s) orally once a day (at bedtime)  glimepiride 2 mg oral tablet: 1 tab(s) orally once a day  Gralise 600 mg/24 hours oral tablet, extended release: 1 tab(s) orally once a day  Januvia 25 mg oral tablet: 1 tab(s) orally once a day  polyethylene glycol 3350 oral powder for reconstitution: 17 gram(s) orally once a day (at bedtime), As Needed  senna oral tablet: 2 tab(s) orally once a day (at bedtime)  spironolactone 25 mg oral tablet: 0.5 tab(s) orally once a day  Toprol- mg oral tablet, extended release: 1.5 tab(s) orally once a day  Vitamin B-12: 1 tab(s) orally 3 times a week  Vitamin D3: 1 tab(s) orally once a day   acetaminophen 325 mg oral tablet: 2 tab(s) orally every 6 hours  amLODIPine 5 mg oral tablet: 1 tab(s) orally once a day  apixaban 2.5 mg oral tablet: 1 tab(s) orally 2 times a day  aspirin 81 mg oral tablet, chewable: 1 tab(s) orally once a day  atorvastatin 40 mg oral tablet: 1 tab(s) orally once a day (at bedtime)  bumetanide 1 mg oral tablet: 1 tab(s) orally 2 times a day  famotidine 20 mg oral tablet: 1 tab(s) orally once a day (at bedtime)  glimepiride 2 mg oral tablet: 1 tab(s) orally once a day  Gralise 600 mg/24 hours oral tablet, extended release: 1 tab(s) orally once a day  Januvia 25 mg oral tablet: 1 tab(s) orally once a day  lidocaine 4% topical film: Apply topically to affected area every 24 hours, As Needed  apply to left shoulder   polyethylene glycol 3350 oral powder for reconstitution: 17 gram(s) orally once a day (at bedtime), As Needed  senna oral tablet: 2 tab(s) orally once a day (at bedtime)  spironolactone 25 mg oral tablet: 0.5 tab(s) orally once a day  Toprol- mg oral tablet, extended release: 1.5 tab(s) orally once a day  Vitamin B-12: 1 tab(s) orally 3 times a week  Vitamin D3: 1 tab(s) orally once a day

## 2022-01-13 NOTE — PROGRESS NOTE ADULT - PROBLEM SELECTOR PLAN 2
RUE and RLE redness and white count on presentation concerning for nonpurulent cellulitis--Now IMPROVING with normal wbc  - continue renally dosed ancef 2gm iv q12 hours (1/8-; day 4 of 5)  - RUE and RLE doppler negative for DVT.   patient on eliquis
RUE and RLE redness and white count on presentation concerning for nonpurulent cellulitis  - continue renally dosed ancef 2gm iv q12 hours, monitor response  - RUE and RLE doppler negative for DVT.   patient on eliquis
RUE and RLE redness and white count on presentation concerning for nonpurulent cellulitis--Now IMPROVING with normal wbc  - continue renally dosed ancef 2gm iv q12 hours (1/8-; day 5 of 5). D/c abx tomorrow  - RUE and RLE doppler negative for DVT.   patient on eliquis
RUE and RLE redness and white count on presentation concerning for nonpurulent cellulitis  - continue renally dosed ancef 2gm iv q12 hours, monitor response  - RUE and RLE doppler negative for DVT.   patient on eliquis
RUE and RLE redness and white count on presentation concerning for nonpurulent cellulitis--Now IMPROVING with normal wbc  - continue renally dosed ancef 2gm iv q12 hours (1/8-; day 5 of 5). completed abx  - RUE and RLE doppler negative for DVT.   patient on eliquis

## 2022-01-13 NOTE — PROGRESS NOTE ADULT - PROVIDER SPECIALTY LIST ADULT
Nephrology
Hospitalist
Internal Medicine

## 2022-01-19 ENCOUNTER — APPOINTMENT (OUTPATIENT)
Dept: CARE COORDINATION | Facility: HOME HEALTH | Age: 82
End: 2022-01-19
Payer: MEDICARE

## 2022-01-19 VITALS — OXYGEN SATURATION: 97 % | HEART RATE: 65 BPM | SYSTOLIC BLOOD PRESSURE: 129 MMHG | DIASTOLIC BLOOD PRESSURE: 92 MMHG

## 2022-01-19 DIAGNOSIS — L03.113 CELLULITIS OF RIGHT UPPER LIMB: ICD-10-CM

## 2022-01-19 PROCEDURE — 99496 TRANSJ CARE MGMT HIGH F2F 7D: CPT

## 2022-01-19 RX ORDER — NITROFURANTOIN MACROCRYSTALS 100 MG/1
100 CAPSULE ORAL
Qty: 14 | Refills: 0 | Status: DISCONTINUED | COMMUNITY
Start: 2021-11-09 | End: 2022-01-19

## 2022-01-19 NOTE — REVIEW OF SYSTEMS
[Joint Pain] : joint pain [Joint Stiffness] : joint stiffness [Muscle Pain] : muscle pain [Negative] : Heme/Lymph [FreeTextEntry9] : LUE shoulder pain

## 2022-01-19 NOTE — HISTORY OF PRESENT ILLNESS
[Post-hospitalization from ___ Hospital] : Post-hospitalization from [unfilled] Hospital [Admitted on: ___] : The patient was admitted on [unfilled] [Discharged on ___] : discharged on [unfilled] [Discharge Summary] : discharge summary [Pertinent Labs] : pertinent labs [Discharge Med List] : discharge medication list [Patient Contacted By: ____] : and contacted by [unfilled] [FreeTextEntry2] :  82yo F hx of breast CA, DM, HTN, HLD, CHF, chronic RUE lymphedema comes to ED 1/8/22 for progressive L shoulder pain x weeks admitted with RUE cellulitis and fluid overload, responded to abx and diuretics, TTE showing normal systolic/diastolic function for d/c today with outpatient f/u of likely shoulder arthritis.\par For her acute on chronic decompensated diastolic heart failure, with reported 20-30lb weight gain despite outpatient increase in home diuretic dose, now noted to be mildly hypoxic requiring 1-2 L NC in ED and prior TTE in march 2021 showing grade II diastolic dysfunction, mild/mod TR, severe pHTN she responded to IV diuresis.  Now TTE is normalized but this was done after days of iv diuresis.  Discharge on home diuretics.\par \par Cellulitis responded to 5 days of ancef.  RUE and RLE doppler negative for DVT. \par \par For her Left shoulder pain, Xray negative for fracture or dislocation- calcific tendinosis noted. Likely 2/2 arthritis vs. rotator cuff tendinitis. Tylenol and lidocaine patch prn.  OT recommends home OT.  Recommended outpatient physiatry, heating pad, consider acupuncture.\par \par Since dc, she states she has been feeling ok, but she continues to have left arm pain upon movement.  She has a scar on her left shoulder that she states is from childhood.  She has limited ROM when I manipulated the joint.  She was dc'd with a dx of CHF, currently appears euvolemic, although has chronic right arm lymphedema from hx of breast cancer with right breast mastectomy.  Her weight chart ranges from 186bs-206lbs, but per the daughter's conversation with the TCM RN, sometimes her mother records her sugar level in the weight column, I called her daughter back to clarify, and left a message for call back.  She has a cardio appt on 3/15 and a scheduled appt with her PMD.  TCM numbers provided for any questions or concerns.\par

## 2022-01-19 NOTE — PHYSICAL EXAM
[No Acute Distress] : no acute distress [Well Nourished] : well nourished [Well Developed] : well developed [Well-Appearing] : well-appearing [Normal Sclera/Conjunctiva] : normal sclera/conjunctiva [PERRL] : pupils equal round and reactive to light [EOMI] : extraocular movements intact [Normal Outer Ear/Nose] : the outer ears and nose were normal in appearance [Normal Oropharynx] : the oropharynx was normal [No JVD] : no jugular venous distention [Supple] : supple [No Lymphadenopathy] : no lymphadenopathy [Thyroid Normal, No Nodules] : the thyroid was normal and there were no nodules present [No Respiratory Distress] : no respiratory distress  [Clear to Auscultation] : lungs were clear to auscultation bilaterally [No Accessory Muscle Use] : no accessory muscle use [Normal Rate] : normal rate  [Normal S1, S2] : normal S1 and S2 [No Carotid Bruits] : no carotid bruits [No Abdominal Bruit] : a ~M bruit was not heard ~T in the abdomen [No Varicosities] : no varicosities [Pedal Pulses Present] : the pedal pulses are present [No Extremity Clubbing/Cyanosis] : no extremity clubbing/cyanosis [No Palpable Aorta] : no palpable aorta [Soft] : abdomen soft [Non Tender] : non-tender [Non-distended] : non-distended [No Masses] : no abdominal mass palpated [No HSM] : no HSM [Normal Bowel Sounds] : normal bowel sounds [Normal Posterior Cervical Nodes] : no posterior cervical lymphadenopathy [Normal Anterior Cervical Nodes] : no anterior cervical lymphadenopathy [No CVA Tenderness] : no CVA  tenderness [No Spinal Tenderness] : no spinal tenderness [No Joint Swelling] : no joint swelling [No Rash] : no rash [Normal Gait] : normal gait [Coordination Grossly Intact] : coordination grossly intact [No Focal Deficits] : no focal deficits [Normal Affect] : the affect was normal [Normal Insight/Judgement] : insight and judgment were intact [de-identified] : +2 RUE edema [de-identified] : LUE weakness

## 2022-01-25 ENCOUNTER — APPOINTMENT (OUTPATIENT)
Dept: CARDIOLOGY | Facility: CLINIC | Age: 82
End: 2022-01-25

## 2022-01-25 NOTE — CARDIOLOGY SUMMARY
[de-identified] : 12/14/2021: Sinus Rhythm at 75 bpm with low voltage QRS and an old anteroseptal infarct\par  [___] : [unfilled]

## 2022-01-25 NOTE — DISCUSSION/SUMMARY
[FreeTextEntry1] : IMPRESSION: Ms. Batista is an 81 year old woman with a history of HTN, hyperlipidemia, type 2 diabetes mellitus, breast cancer status post right partial mastectomy, rectal carcinoma status post resection, diastolic heart failure, anemia, hypokalemia, CAD s/p 2-vessel CABG 11/9/2020, and renal insufficiency who presents today for follow up of HTN and heart failure.\par \par PLAN:\par 1. She is s/p CABG 11/2020 and has not experienced any chest pain since her surgery.  She will continue on Aspirin 81 mg daily. Her ECG that was performed today revealed sinus rhythm without any ectopy and was relatively unchanged from previous. Her weight has remained stable. She will continue on Bumex 1 mg twice daily on Mondays and Fridays, and 1 mg daily the other five days. She will also continue on Spironolactone 12.5 mg daily as long as her potassium and creatinine are stable. She will have bloodwork done later today with you. She will also continue on a low sodium diet and restrict her fluid intake. \par 2. She will continue on Eliquis 2.5 mg twice daily for her DVT and she will continue to follow up with Vascular Cardiology. She was borderline anemic, but stable on her most recent blood work.\par 3. Her blood pressure is very well controlled. She will continue on Metoprolol  mg daily and Amlodipine 5 mg daily, in addition to her diuretic regimen.\par 4. Her most recent LDL was very good with mildly elevated triglycerides. She will continue on Lipitor 40 mg daily along with a low cholesterol diet. \par 5. She will follow up with me in 3 months or sooner if she is symptomatic. Her daughter will notify me should her weight trend up. \par \par \par 530\par 544

## 2022-01-25 NOTE — HISTORY OF PRESENT ILLNESS
[FreeTextEntry1] : Patient is an 81 year old woman with a history of HTN, hyperlipidemia, type 2 diabetes mellitus, breast cancer status post right partial mastectomy, rectal carcinoma status post resection, anemia, hypokalemia, diastolic heart failure, right lower extremity DVT, CAD s/p 2 vessel CABG 11/9/2020 and renal insufficiency who presents today for follow up of HTN and heart failure. Her daughter states that she has been less active because she has been sad and under increased stress as her son passed away. She otherwise denies any chest pain, palpitations, dyspnea at rest, headaches, or dizziness. Her right upper extremity swelling due to chronic lymphedema and dyspnea with exertion are unchanged. As per her daughter, right now she is taking Bumex 1mg BID on Monday and Friday and 1mg daily the other 5 days. Her weight has been steady at 180 or 181 lbs.\par \par \par weight stable at 185\par \par was almost 200 pounds when she was admitted with cellulitis of the arm with the lymphedeam. \par cellulitis is better\par \par taking bumex 1 daily
49.5

## 2022-03-15 ENCOUNTER — APPOINTMENT (OUTPATIENT)
Dept: INTERNAL MEDICINE | Facility: CLINIC | Age: 82
End: 2022-03-15
Payer: MEDICARE

## 2022-03-15 ENCOUNTER — NON-APPOINTMENT (OUTPATIENT)
Age: 82
End: 2022-03-15

## 2022-03-15 ENCOUNTER — LABORATORY RESULT (OUTPATIENT)
Age: 82
End: 2022-03-15

## 2022-03-15 ENCOUNTER — APPOINTMENT (OUTPATIENT)
Dept: CARDIOLOGY | Facility: CLINIC | Age: 82
End: 2022-03-15
Payer: MEDICARE

## 2022-03-15 VITALS
DIASTOLIC BLOOD PRESSURE: 77 MMHG | TEMPERATURE: 97.5 F | HEART RATE: 68 BPM | WEIGHT: 195 LBS | RESPIRATION RATE: 12 BRPM | OXYGEN SATURATION: 99 % | BODY MASS INDEX: 36.82 KG/M2 | HEIGHT: 61 IN | SYSTOLIC BLOOD PRESSURE: 132 MMHG

## 2022-03-15 DIAGNOSIS — I89.0 LYMPHEDEMA, NOT ELSEWHERE CLASSIFIED: ICD-10-CM

## 2022-03-15 PROCEDURE — 99214 OFFICE O/P EST MOD 30 MIN: CPT

## 2022-03-15 PROCEDURE — 99214 OFFICE O/P EST MOD 30 MIN: CPT | Mod: 25

## 2022-03-15 PROCEDURE — 93000 ELECTROCARDIOGRAM COMPLETE: CPT

## 2022-03-15 NOTE — DISCUSSION/SUMMARY
[FreeTextEntry1] : IMPRESSION: Ms. Batista is an 82 year old woman with a history of HTN, hyperlipidemia, type 2 diabetes mellitus, breast cancer status post right partial mastectomy, rectal carcinoma status post resection, diastolic heart failure, anemia, hypokalemia, CAD s/p 2-vessel CABG 11/9/2020, and renal insufficiency who presents today for follow up of HTN and heart failure.\par \par PLAN:\par 1. She is s/p CABG 11/2020 and has not experienced any chest pain since her surgery.  She will continue on Aspirin 81 mg daily. Her ECG that was performed today revealed sinus rhythm without any ectopy and was relatively unchanged from previous.  Her daughter states that her weight has recently increased despite taking Bumex twice daily twice a week and once daily the other 5 days.  I have asked her to increase her Bumex to twice daily every other day and once daily the other days.Her daughter will continue to follow her weights.  We should aim for a weight of at least around 190 pounds.  She will also continue on Spironolactone 12.5 mg daily as long as her potassium and creatinine are stable. She will have bloodwork done later today with you. She will also continue on a low sodium diet and restrict her fluid intake. \par 2. She will continue on Eliquis 2.5 mg twice daily for her DVT and she will continue to follow up with Vascular Cardiology. She was borderline anemic, but stable on her most recent blood work.\par 3. Her blood pressure is adequate at this time.  She will continue on Metoprolol  mg daily and Amlodipine 5 mg daily, in addition to her diuretic regimen.\par 4. Her most recent LDL was good, thus she will continue on Lipitor 40 mg daily along with a low cholesterol diet. \par 5. She will follow up with me in 3 months or sooner if she is symptomatic. Her daughter will notify me should her weight not improve.

## 2022-03-15 NOTE — HISTORY OF PRESENT ILLNESS
[FreeTextEntry1] : Patient is an 82 year old woman with a history of HTN, hyperlipidemia, type 2 diabetes mellitus, breast cancer status post right partial mastectomy, rectal carcinoma status post resection, anemia, hypokalemia, diastolic heart failure, right lower extremity DVT, CAD s/p 2 vessel CABG 11/9/2020 and renal insufficiency who presents today for follow up of HTN and heart failure. Her daughter states that she has been less active.  She has gained weight and since her last visit her weight most recently has been around 195 pounds.  She has been taking Bumex 1 mg daily and twice a week she takes it twice daily. She otherwise denies any chest pain, palpitations, dyspnea at rest, headaches, or dizziness. Her right upper extremity swelling due to chronic lymphedema and dyspnea with exertion are unchanged.  She recently started to complain of issues with her eyesight (describes vision loss) and was found to have bleeding behind the eye.  She received "an injection" at that time and is scheduled for follow-up with them.

## 2022-03-15 NOTE — PHYSICAL EXAM
[General Appearance - Well Developed] : well developed [Normal Appearance] : normal appearance [Well Groomed] : well groomed [General Appearance - Well Nourished] : well nourished [No Deformities] : no deformities [General Appearance - In No Acute Distress] : no acute distress [Normal Conjunctiva] : the conjunctiva exhibited no abnormalities [Respiration, Rhythm And Depth] : normal respiratory rhythm and effort [Exaggerated Use Of Accessory Muscles For Inspiration] : no accessory muscle use [Auscultation Breath Sounds / Voice Sounds] : lungs were clear to auscultation bilaterally [Normal Rate] : normal [Rhythm Regular] : regular [Normal S1] : normal S1 [Normal S2] : normal S2 [II] : a grade 2 [___ +] : bilateral [unfilled]U+ pretibial pitting edema [Bowel Sounds] : normal bowel sounds [Abdomen Soft] : soft [Abdomen Tenderness] : non-tender [Nail Clubbing] : no clubbing of the fingernails [Cyanosis, Localized] : no localized cyanosis [Petechial Hemorrhages (___cm)] : no petechial hemorrhages [Skin Color & Pigmentation] : normal skin color and pigmentation [] : no rash [No Skin Ulcers] : no skin ulcer [Oriented To Time, Place, And Person] : oriented to person, place, and time [Affect] : the affect was normal [Mood] : the mood was normal [No Anxiety] : not feeling anxious [Right Carotid Bruit] : no bruit heard over the right carotid [Left Carotid Bruit] : no bruit heard over the left carotid [Bruit] : no bruit heard [FreeTextEntry1] : Her gait is slow secondary to joint pain.

## 2022-03-15 NOTE — CARDIOLOGY SUMMARY
[___] : [unfilled] [de-identified] : 3/15/2022: Sinus Rhythm at 74 bpm with low voltage QRS and an old anteroseptal infarct\par  [de-identified] : 1/11/2022: Endocardium not well visualized; grossly normal left ventricular systolic function.  EF is approximately 65%.

## 2022-03-16 ENCOUNTER — APPOINTMENT (OUTPATIENT)
Dept: INTERNAL MEDICINE | Facility: CLINIC | Age: 82
End: 2022-03-16
Payer: MEDICARE

## 2022-03-16 ENCOUNTER — LABORATORY RESULT (OUTPATIENT)
Age: 82
End: 2022-03-16

## 2022-03-16 PROCEDURE — 99214 OFFICE O/P EST MOD 30 MIN: CPT | Mod: 95

## 2022-03-17 PROBLEM — I89.0 LYMPHEDEMA OF RIGHT LOWER EXTREMITY: Status: ACTIVE | Noted: 2021-01-29

## 2022-03-17 NOTE — HISTORY OF PRESENT ILLNESS
[Home] : at home, [unfilled] , at the time of the visit. [Medical Office: ([facility])___] : at the medical office located in  [de-identified] : Patient is an 82 year old woman with a history of HTN, hyperlipidemia, type 2 diabetes mellitus, breast cancer status post right partial mastectomy, rectal carcinoma status post resection, anemia, hypokalemia, diastolic heart failure, right lower extremity DVT, CAD s/p 2 vessel CABG 11/9/2020 and renal insufficiency who presents today for follow up on abn lab results.\par Recent blood work reveal uncontrolled DM , low HDL, worsening of renal insufficiency .As per pt daughter pt not compliant with diet and Lantus injection \par She denies CP, SOB, + chronic BHAT

## 2022-03-17 NOTE — PLAN
[FreeTextEntry1] : \par \par 1. HTN/HLD/HF/DVT history \par cont Amlodipine 5 mg daily\par cont Metoprolol 150 mg daily\par cont Bumetanide 1 mg daily ( night dose on Mondays & Fridays) \par cont Spironolactone 25 mg 1/2 tab daily\par cont Atorvastatin 40 mg daily\par Cont ASA 81 mg daily\par cont Eliquis 2.5 mg daily\par 2. CKD, worsening \par nephrology follow up\par \par 3. Diabetes, poor control \par Low carb, low sugar diet/ increased physical activity\par cont Januvia 25 mg daily\par d/c Glimepiride \par start Lantus 10 u QHS \par Endocrine referral \par \par All labs d/w pt\par

## 2022-03-17 NOTE — PLAN
[FreeTextEntry1] : \par \par 1. HTN/HLD/HF/DVT history \par cont Amlodipine 5 mg daily\par cont Metoprolol 150 mg daily\par cont Bumetanide 1 mg daily ( night dose on Mondays & Fridays) \par cont Spironolactone 25 mg 1/2 tab daily\par cont Atorvastatin 40 mg daily\par Cont ASA 81 mg daily\par cont Eliquis 2.5 mg daily\par 2. CKD\par will monitor renal function \par nephrology follow up\par \par 3. Diabetes\par Low carb, low sugar diet/ increased physical activity\par cont Januvia 25 mg daily\par cont Glimepiride 2 mg dialy\par not compliant with Lantus \par Endocrine referral \par \par \par 4. History of breast cancer/ colon cancer \par Mammo pending \par decline oncology follow up \par \par

## 2022-03-17 NOTE — PHYSICAL EXAM
[No Acute Distress] : no acute distress [Well Nourished] : well nourished [Well Developed] : well developed [Well-Appearing] : well-appearing [Normal Sclera/Conjunctiva] : normal sclera/conjunctiva [PERRL] : pupils equal round and reactive to light [EOMI] : extraocular movements intact [Normal Outer Ear/Nose] : the outer ears and nose were normal in appearance [Normal Oropharynx] : the oropharynx was normal [No JVD] : no jugular venous distention [Supple] : supple [No Lymphadenopathy] : no lymphadenopathy [Thyroid Normal, No Nodules] : the thyroid was normal and there were no nodules present [No Respiratory Distress] : no respiratory distress  [Clear to Auscultation] : lungs were clear to auscultation bilaterally [No Accessory Muscle Use] : no accessory muscle use [Normal Rate] : normal rate  [Normal S1, S2] : normal S1 and S2 [No Carotid Bruits] : no carotid bruits [No Varicosities] : no varicosities [No Abdominal Bruit] : a ~M bruit was not heard ~T in the abdomen [Pedal Pulses Present] : the pedal pulses are present [No Extremity Clubbing/Cyanosis] : no extremity clubbing/cyanosis [No Palpable Aorta] : no palpable aorta [Soft] : abdomen soft [Non Tender] : non-tender [No Masses] : no abdominal mass palpated [Non-distended] : non-distended [No HSM] : no HSM [Normal Bowel Sounds] : normal bowel sounds [Normal Posterior Cervical Nodes] : no posterior cervical lymphadenopathy [Normal Anterior Cervical Nodes] : no anterior cervical lymphadenopathy [No CVA Tenderness] : no CVA  tenderness [No Spinal Tenderness] : no spinal tenderness [No Joint Swelling] : no joint swelling [No Rash] : no rash [No Focal Deficits] : no focal deficits [Normal Affect] : the affect was normal [Normal Insight/Judgement] : insight and judgment were intact [de-identified] : + JAKY 2/6  [de-identified] :  RUE lymphedema [de-identified] : LUE weakness

## 2022-03-18 LAB
25(OH)D3 SERPL-MCNC: 49.4 NG/ML
ALBUMIN SERPL ELPH-MCNC: 4 G/DL
ALP BLD-CCNC: 86 U/L
ALT SERPL-CCNC: 11 U/L
ANION GAP SERPL CALC-SCNC: 17 MMOL/L
APPEARANCE: ABNORMAL
AST SERPL-CCNC: 15 U/L
BASOPHILS # BLD AUTO: 0.04 K/UL
BASOPHILS NFR BLD AUTO: 0.4 %
BILIRUB SERPL-MCNC: 0.2 MG/DL
BILIRUBIN URINE: NEGATIVE
BLOOD URINE: NEGATIVE
BUN SERPL-MCNC: 41 MG/DL
CALCIUM SERPL-MCNC: 9.7 MG/DL
CHLORIDE SERPL-SCNC: 99 MMOL/L
CHOLEST SERPL-MCNC: 131 MG/DL
CO2 SERPL-SCNC: 23 MMOL/L
COLOR: YELLOW
CREAT SERPL-MCNC: 2.19 MG/DL
EGFR: 22 ML/MIN/1.73M2
EOSINOPHIL # BLD AUTO: 0.13 K/UL
EOSINOPHIL NFR BLD AUTO: 1.3 %
ESTIMATED AVERAGE GLUCOSE: 189 MG/DL
GLUCOSE QUALITATIVE U: NEGATIVE
GLUCOSE SERPL-MCNC: 131 MG/DL
HBA1C MFR BLD HPLC: 8.2 %
HCT VFR BLD CALC: 42.1 %
HDLC SERPL-MCNC: 49 MG/DL
HGB BLD-MCNC: 12.4 G/DL
IMM GRANULOCYTES NFR BLD AUTO: 0.3 %
KETONES URINE: NEGATIVE
LDLC SERPL CALC-MCNC: 56 MG/DL
LEUKOCYTE ESTERASE URINE: ABNORMAL
LYMPHOCYTES # BLD AUTO: 1.69 K/UL
LYMPHOCYTES NFR BLD AUTO: 17.1 %
MAN DIFF?: NORMAL
MCHC RBC-ENTMCNC: 27.5 PG
MCHC RBC-ENTMCNC: 29.5 GM/DL
MCV RBC AUTO: 93.3 FL
MONOCYTES # BLD AUTO: 0.84 K/UL
MONOCYTES NFR BLD AUTO: 8.5 %
NEUTROPHILS # BLD AUTO: 7.18 K/UL
NEUTROPHILS NFR BLD AUTO: 72.4 %
NITRITE URINE: POSITIVE
NONHDLC SERPL-MCNC: 82 MG/DL
NT-PROBNP SERPL-MCNC: 403 PG/ML
PH URINE: 6.5
PLATELET # BLD AUTO: 310 K/UL
POTASSIUM SERPL-SCNC: 5.3 MMOL/L
PROT SERPL-MCNC: 7.5 G/DL
PROTEIN URINE: ABNORMAL
RBC # BLD: 4.51 M/UL
RBC # FLD: 17.5 %
SODIUM SERPL-SCNC: 139 MMOL/L
SPECIFIC GRAVITY URINE: 1.02
TRIGL SERPL-MCNC: 130 MG/DL
TSH SERPL-ACNC: 1.53 UIU/ML
UROBILINOGEN URINE: NORMAL
VIT B12 SERPL-MCNC: 1028 PG/ML
WBC # FLD AUTO: 9.91 K/UL

## 2022-03-24 ENCOUNTER — APPOINTMENT (OUTPATIENT)
Dept: OPHTHALMOLOGY | Facility: CLINIC | Age: 82
End: 2022-03-24
Payer: MEDICARE

## 2022-03-24 ENCOUNTER — NON-APPOINTMENT (OUTPATIENT)
Age: 82
End: 2022-03-24

## 2022-03-24 PROCEDURE — 92004 COMPRE OPH EXAM NEW PT 1/>: CPT

## 2022-03-24 PROCEDURE — 92136 OPHTHALMIC BIOMETRY: CPT

## 2022-03-24 PROCEDURE — 92134 CPTRZ OPH DX IMG PST SGM RTA: CPT

## 2022-04-04 ENCOUNTER — APPOINTMENT (OUTPATIENT)
Dept: OPHTHALMOLOGY | Facility: CLINIC | Age: 82
End: 2022-04-04
Payer: MEDICARE

## 2022-04-04 ENCOUNTER — NON-APPOINTMENT (OUTPATIENT)
Age: 82
End: 2022-04-04

## 2022-04-04 PROCEDURE — 92012 INTRM OPH EXAM EST PATIENT: CPT

## 2022-04-07 ENCOUNTER — APPOINTMENT (OUTPATIENT)
Dept: INTERNAL MEDICINE | Facility: CLINIC | Age: 82
End: 2022-04-07
Payer: MEDICARE

## 2022-04-07 VITALS
TEMPERATURE: 97.1 F | HEART RATE: 76 BPM | RESPIRATION RATE: 12 BRPM | OXYGEN SATURATION: 95 % | DIASTOLIC BLOOD PRESSURE: 59 MMHG | WEIGHT: 195 LBS | HEIGHT: 61 IN | BODY MASS INDEX: 36.82 KG/M2 | SYSTOLIC BLOOD PRESSURE: 90 MMHG

## 2022-04-07 DIAGNOSIS — H26.9 UNSPECIFIED CATARACT: ICD-10-CM

## 2022-04-07 DIAGNOSIS — Z86.39 PERSONAL HISTORY OF OTHER ENDOCRINE, NUTRITIONAL AND METABOLIC DISEASE: ICD-10-CM

## 2022-04-07 PROCEDURE — 99214 OFFICE O/P EST MOD 30 MIN: CPT

## 2022-04-07 RX ORDER — INSULIN DETEMIR 100 [IU]/ML
100 INJECTION, SOLUTION SUBCUTANEOUS
Qty: 15 | Refills: 2 | Status: DISCONTINUED | COMMUNITY
Start: 2021-09-16 | End: 2022-04-07

## 2022-04-08 ENCOUNTER — APPOINTMENT (OUTPATIENT)
Dept: OPHTHALMOLOGY | Facility: CLINIC | Age: 82
End: 2022-04-08

## 2022-04-08 PROBLEM — H26.9 CATARACT: Status: ACTIVE | Noted: 2022-04-08

## 2022-04-08 NOTE — HISTORY OF PRESENT ILLNESS
[No Pertinent Cardiac History] : no history of aortic stenosis, atrial fibrillation, coronary artery disease, recent myocardial infarction, or implantable device/pacemaker [Coronary Artery Disease] : coronary artery disease [No Pertinent Pulmonary History] : no history of asthma, COPD, sleep apnea, or smoking [No Adverse Anesthesia Reaction] : no adverse anesthesia reaction in self or family member [Chronic Kidney Disease] : chronic kidney disease [Diabetes] : diabetes [Aortic Stenosis] : no aortic stenosis [Atrial Fibrillation] : no atrial fibrillation [Recent Myocardial Infarction] : no recent myocardial infarction [Implantable Device/Pacemaker] : no implantable device/pacemaker [Asthma] : no asthma [COPD] : no COPD [Sleep Apnea] : no sleep apnea [Smoker] : not a smoker [Family Member] : no family member with adverse anesthesia reaction/sudden death [Self] : no previous adverse anesthesia reaction [Chronic Anticoagulation] : no chronic anticoagulation [FreeTextEntry1] : Right eye cataract extraction  [FreeTextEntry2] : 04/12/2022 [FreeTextEntry3] : Dr. Draper  [FreeTextEntry4] : Patient is an 82 year old woman with a history of HTN, hyperlipidemia, type 2 diabetes mellitus, breast cancer status post right partial mastectomy, rectal carcinoma status post resection, anemia, hypokalemia, diastolic heart failure, right lower extremity DVT, CAD s/p 2 vessel CABG 11/9/2020 and renal insufficiency who presents today for medical clearance prior to cataract surgery\par \par Denies any CP, SOB, HA, N/V or abdominal pain \par Fax [phone] Attn Ирина

## 2022-04-08 NOTE — PHYSICAL EXAM

## 2022-04-08 NOTE — PLAN
[FreeTextEntry1] : Patient is an 82 year old woman with a history of HTN, hyperlipidemia, type 2 diabetes mellitus, breast cancer status post right partial mastectomy, rectal carcinoma status post resection, anemia, hypokalemia, diastolic heart failure, right lower extremity DVT, CAD s/p 2 vessel CABG 11/9/2020 and renal insufficiency medically stable for planned surgery.\par History of DVT> instructed to hold Eliquis for 2 days prior to procedure and continue aspirin\par DM > instructed to hold glimepiride and Januvia on the day of the surgery and restart after the procedure\par HTN > continue blood pressure medication on the day of the surgery\par Hyperlipidemia> on atorvastatin

## 2022-04-12 ENCOUNTER — APPOINTMENT (OUTPATIENT)
Dept: OPHTHALMOLOGY | Facility: AMBULATORY SURGERY CENTER | Age: 82
End: 2022-04-12
Payer: MEDICARE

## 2022-04-12 PROCEDURE — 66982 XCAPSL CTRC RMVL CPLX WO ECP: CPT | Mod: RT

## 2022-04-13 ENCOUNTER — APPOINTMENT (OUTPATIENT)
Dept: OPHTHALMOLOGY | Facility: CLINIC | Age: 82
End: 2022-04-13
Payer: MEDICARE

## 2022-04-13 ENCOUNTER — NON-APPOINTMENT (OUTPATIENT)
Age: 82
End: 2022-04-13

## 2022-04-13 PROCEDURE — 92012 INTRM OPH EXAM EST PATIENT: CPT

## 2022-04-19 ENCOUNTER — APPOINTMENT (OUTPATIENT)
Dept: OPHTHALMOLOGY | Facility: CLINIC | Age: 82
End: 2022-04-19

## 2022-04-20 ENCOUNTER — APPOINTMENT (OUTPATIENT)
Dept: OPHTHALMOLOGY | Facility: CLINIC | Age: 82
End: 2022-04-20
Payer: MEDICARE

## 2022-04-20 ENCOUNTER — NON-APPOINTMENT (OUTPATIENT)
Age: 82
End: 2022-04-20

## 2022-04-20 PROCEDURE — 99024 POSTOP FOLLOW-UP VISIT: CPT

## 2022-06-07 ENCOUNTER — APPOINTMENT (OUTPATIENT)
Dept: OPHTHALMOLOGY | Facility: CLINIC | Age: 82
End: 2022-06-07
Payer: MEDICARE

## 2022-06-07 ENCOUNTER — NON-APPOINTMENT (OUTPATIENT)
Age: 82
End: 2022-06-07

## 2022-06-07 PROCEDURE — 92134 CPTRZ OPH DX IMG PST SGM RTA: CPT

## 2022-06-07 PROCEDURE — 99024 POSTOP FOLLOW-UP VISIT: CPT

## 2022-06-10 ENCOUNTER — APPOINTMENT (OUTPATIENT)
Dept: OPHTHALMOLOGY | Facility: CLINIC | Age: 82
End: 2022-06-10

## 2022-06-28 NOTE — PHYSICAL EXAM
Problem: At Risk for Falls  Goal: # Patient does not fall  Outcome: Outcome Met, Continue evaluating goal progress toward completion  Goal: # Verbalizes understanding of fall risk/precautions  Description: Document education using the patient education activity  Outcome: Outcome Met, Continue evaluating goal progress toward completion  Goal: # Takes action to control fall-related risks  Outcome: Outcome Met, Continue evaluating goal progress toward completion     Problem: Pain  Goal: # Verbalizes understanding of pain management  Description: Documented in Patient Education Activity  Outcome: Outcome Met, Continue evaluating goal progress toward completion     Problem: VTE, Risk for  Goal: # No s/s of VTE  Outcome: Outcome Met, Continue evaluating goal progress toward completion      [Well-Appearing] : well-appearing

## 2022-07-06 ENCOUNTER — RX RENEWAL (OUTPATIENT)
Age: 82
End: 2022-07-06

## 2022-07-06 ENCOUNTER — NON-APPOINTMENT (OUTPATIENT)
Age: 82
End: 2022-07-06

## 2022-07-06 ENCOUNTER — APPOINTMENT (OUTPATIENT)
Dept: INTERNAL MEDICINE | Facility: CLINIC | Age: 82
End: 2022-07-06

## 2022-07-06 ENCOUNTER — APPOINTMENT (OUTPATIENT)
Dept: CARDIOLOGY | Facility: CLINIC | Age: 82
End: 2022-07-06
Payer: MEDICARE

## 2022-07-06 VITALS
DIASTOLIC BLOOD PRESSURE: 84 MMHG | SYSTOLIC BLOOD PRESSURE: 158 MMHG | BODY MASS INDEX: 36.85 KG/M2 | HEART RATE: 72 BPM | OXYGEN SATURATION: 97 % | TEMPERATURE: 97.1 F | RESPIRATION RATE: 12 BRPM | WEIGHT: 195 LBS

## 2022-07-06 VITALS
OXYGEN SATURATION: 97 % | RESPIRATION RATE: 12 BRPM | TEMPERATURE: 97.1 F | DIASTOLIC BLOOD PRESSURE: 84 MMHG | WEIGHT: 195 LBS | BODY MASS INDEX: 36.82 KG/M2 | SYSTOLIC BLOOD PRESSURE: 158 MMHG | HEIGHT: 61 IN | HEART RATE: 72 BPM

## 2022-07-06 DIAGNOSIS — R06.02 SHORTNESS OF BREATH: ICD-10-CM

## 2022-07-06 PROCEDURE — 93000 ELECTROCARDIOGRAM COMPLETE: CPT

## 2022-07-06 PROCEDURE — 99214 OFFICE O/P EST MOD 30 MIN: CPT | Mod: 25

## 2022-07-06 PROCEDURE — 99214 OFFICE O/P EST MOD 30 MIN: CPT

## 2022-07-06 RX ORDER — NITROFURANTOIN MACROCRYSTALS 100 MG/1
100 CAPSULE ORAL TWICE DAILY
Qty: 10 | Refills: 0 | Status: DISCONTINUED | COMMUNITY
Start: 2022-03-21 | End: 2022-07-06

## 2022-07-06 NOTE — REASON FOR VISIT
Review of System:  Musculoskeletal problem(s):foot pain  Integumentary problem(s):negative  Endocrine problem(s):diabetes     Past Family Social History:  Family History of diabetes:  sister  Social History:  non-smoker, no alcohol consumption  Medications, allergies, problem list, past medical history, past surgical history, social history, and family history were all reviewed in the electronic medical record.    Patient presents to clinic today with a chief complaint of painful, inflamed toenails which the patient states hurt inside the shoes.  The patient reports painful nails that they can no longer self debride.  The pain is related as being a pressure type of pain occurring when the tops of the shoes rub on the toenails.  Pain is related as being mild to moderate, and is made worse with dressier shoes.  The nails have not responded to conservative treatment. Nurse's notes were reviewed and accepted.    Physical Exam:  Reveals nails that are thickened, lytic, brittle, discolored with subungual debris present.  There is noted pain to palpation of the nails.  The nails are flaky and also exhibit an odor consistent with onychomycosis.  The nails are also dystrophic.  Nails affected are left 1, left 5, right 1, right 5.  Any remaining nails are observed to be elongated and non-dystrophic. Sensation intact via semmes anabella monofilament wire test      Neurologic:  Patient is alert and oriented and in appropriate mood.      Constitutional:  Patient's general appearance is normal.    Peripheral Vascular Exam:  Reveals hair atrophy, thin/shiny skin, thickened nails.    Diagnosis:  Dystrophic nails which are also mycotic with report of pain,  Diabetes    Procedure:  The patient's feet were examined on today's date.  The patient was instructed about good foot health.  All the affected nails were debrided utilizing sharp and mechanical debridement, reducing the diseased nail tissue to a level that alleviated the  patient's symptoms and is medically safe for the patient once verbal consent was obtained.   All the sharp nail care was accomplished using a sterile nail clipper.   The patient was then instructed about his or her conditions and recommendations were made for these conditions.  The recommendations post nail debridement include OTC topical medication, oral medication such as Lamisil, prescription topical medication, and mycotic nail laser.  We discussed the pros and cons of these treatments.  The patient was instructed to return to clinic as needed.  Today's treatment was confirmed to be medically necessary.  Wrote script for Penlac lacquer to be applied daily to affected nails   [Heart Failure] : congestive heart failure [Hypertension] : hypertension

## 2022-07-14 ENCOUNTER — APPOINTMENT (OUTPATIENT)
Dept: INTERNAL MEDICINE | Facility: CLINIC | Age: 82
End: 2022-07-14

## 2022-07-14 VITALS
OXYGEN SATURATION: 95 % | TEMPERATURE: 97.1 F | RESPIRATION RATE: 12 BRPM | HEART RATE: 74 BPM | DIASTOLIC BLOOD PRESSURE: 87 MMHG | SYSTOLIC BLOOD PRESSURE: 150 MMHG | HEIGHT: 61 IN | BODY MASS INDEX: 37.19 KG/M2 | WEIGHT: 197 LBS

## 2022-07-14 DIAGNOSIS — B36.9 SUPERFICIAL MYCOSIS, UNSPECIFIED: ICD-10-CM

## 2022-07-14 DIAGNOSIS — Z01.818 ENCOUNTER FOR OTHER PREPROCEDURAL EXAMINATION: ICD-10-CM

## 2022-07-14 PROCEDURE — 99214 OFFICE O/P EST MOD 30 MIN: CPT

## 2022-07-14 RX ORDER — ECONAZOLE NITRATE 10 MG/G
1 CREAM TOPICAL TWICE DAILY
Qty: 1 | Refills: 1 | Status: ACTIVE | COMMUNITY
Start: 2022-07-14 | End: 1900-01-01

## 2022-07-14 NOTE — PLAN
[FreeTextEntry1] : 1. see plan\par 2. SOB / Htn/ CHF\par cardio follow up\par see plan\par  Bumetanide 1mg BID\par Spironolactone 25 mg half pill a day\par Continue amlodipine 5 mg daily\par Continue metoprolol 100 mg daily\par 3.  Diabetes\par Continue glimepiride 2 mg daily\par Januvia 25 mg daily\par Will monitor hemoglobin A1c

## 2022-07-14 NOTE — PHYSICAL EXAM
[No Acute Distress] : no acute distress [Well Nourished] : well nourished [Normal Sclera/Conjunctiva] : normal sclera/conjunctiva [PERRL] : pupils equal round and reactive to light [EOMI] : extraocular movements intact [Normal Outer Ear/Nose] : the outer ears and nose were normal in appearance [Normal Oropharynx] : the oropharynx was normal [No JVD] : no jugular venous distention [No Lymphadenopathy] : no lymphadenopathy [Supple] : supple [Thyroid Normal, No Nodules] : the thyroid was normal and there were no nodules present [No Respiratory Distress] : no respiratory distress  [No Accessory Muscle Use] : no accessory muscle use [Clear to Auscultation] : lungs were clear to auscultation bilaterally [Normal Rate] : normal rate  [Regular Rhythm] : with a regular rhythm [Normal S1, S2] : normal S1 and S2 [No Murmur] : no murmur heard [No Carotid Bruits] : no carotid bruits [Pedal Pulses Present] : the pedal pulses are present [No Palpable Aorta] : no palpable aorta [Soft] : abdomen soft [Non Tender] : non-tender [Non-distended] : non-distended [Normal Bowel Sounds] : normal bowel sounds [Normal Posterior Cervical Nodes] : no posterior cervical lymphadenopathy [Normal Anterior Cervical Nodes] : no anterior cervical lymphadenopathy [No CVA Tenderness] : no CVA  tenderness [No Spinal Tenderness] : no spinal tenderness [No Joint Swelling] : no joint swelling [Grossly Normal Strength/Tone] : grossly normal strength/tone [No Rash] : no rash [Coordination Grossly Intact] : coordination grossly intact [No Focal Deficits] : no focal deficits [Normal Affect] : the affect was normal [Normal Insight/Judgement] : insight and judgment were intact [de-identified] :  + varicose vein, Left arm lymphedema / + b/l low extremities swelling /RLE  erythema  [de-identified] : + LE hyperpigmentation  [de-identified] : ambulates with walker

## 2022-07-14 NOTE — HISTORY OF PRESENT ILLNESS
[Coronary Artery Disease] : coronary artery disease [No Pertinent Pulmonary History] : no history of asthma, COPD, sleep apnea, or smoking [No Adverse Anesthesia Reaction] : no adverse anesthesia reaction in self or family member [Chronic Kidney Disease] : chronic kidney disease [Diabetes] : diabetes [Aortic Stenosis] : no aortic stenosis [Atrial Fibrillation] : no atrial fibrillation [Recent Myocardial Infarction] : no recent myocardial infarction [Implantable Device/Pacemaker] : no implantable device/pacemaker [Asthma] : no asthma [COPD] : no COPD [Sleep Apnea] : no sleep apnea [Smoker] : not a smoker [Family Member] : no family member with adverse anesthesia reaction/sudden death [Self] : no previous adverse anesthesia reaction [Chronic Anticoagulation] : chronic anticoagulation [FreeTextEntry1] : left  eye cataract extraction  [FreeTextEntry3] : Dr. Draper  [FreeTextEntry4] : Patient is an 82 year old woman with a history of HTN, hyperlipidemia, type 2 diabetes mellitus, breast cancer status post right partial mastectomy, rectal carcinoma status post resection, anemia, hypokalemia, diastolic heart failure, right lower extremity DVT, CAD s/p 2 vessel CABG 11/9/2020 and renal insufficiency/  who presents today for medical clearance \par As per daughter not compliant with daily home glucose monitoring\par Denies any CP, SOB, N/V or abdominal pain . RLE sweling / erythema slightly improved

## 2022-07-14 NOTE — HISTORY OF PRESENT ILLNESS
[No Pertinent Cardiac History] : no history of aortic stenosis, atrial fibrillation, coronary artery disease, recent myocardial infarction, or implantable device/pacemaker [Coronary Artery Disease] : coronary artery disease [No Pertinent Pulmonary History] : no history of asthma, COPD, sleep apnea, or smoking [No Adverse Anesthesia Reaction] : no adverse anesthesia reaction in self or family member [Chronic Kidney Disease] : chronic kidney disease [Diabetes] : diabetes [Aortic Stenosis] : no aortic stenosis [Atrial Fibrillation] : no atrial fibrillation [Recent Myocardial Infarction] : no recent myocardial infarction [Implantable Device/Pacemaker] : no implantable device/pacemaker [Asthma] : no asthma [COPD] : no COPD [Sleep Apnea] : no sleep apnea [Smoker] : not a smoker [Family Member] : no family member with adverse anesthesia reaction/sudden death [Self] : no previous adverse anesthesia reaction [Chronic Anticoagulation] : no chronic anticoagulation [FreeTextEntry1] : left eye cataract extraction  [FreeTextEntry2] : 7/27//2022 [FreeTextEntry3] : Dr. Draper  [FreeTextEntry4] : Patient is an 82 year old woman with a history of HTN, hyperlipidemia, type 2 diabetes mellitus, breast cancer status post right partial mastectomy, rectal carcinoma status post resection, anemia, hypokalemia, diastolic heart failure, right lower extremity DVT, CAD s/p 2 vessel CABG 11/9/2020 and renal insufficiency who presents today for medical clearance prior to cataract surgery\par Patient complaining worsening, shortness of breath for the last 2 to 3 weeks and leg swelling and redness.  She only takes bumetanide 1 mg once daily.  She report poor glycemic control at home with finger blood sugar running around 180-190 \par Pt denies CP/ dizziness, abd pain

## 2022-07-14 NOTE — PLAN
[FreeTextEntry1] : 1. DM, poor control\par Hb A1C 9.3 \par endo follow up\par cont Glimeperide 2 mg QD\par cont Januvia 25 mg QD\par re start Ozempic 2,5 mg Q week \par 2. CHF/ Htn/ CAD\par cardio follow up\par cont Bumetanide 1 mg BID\par Spironolactone 25 mg 1/2 t d \par Amlodipine 5 mg QD\par Metoprolol 100 mg QD\par 3. RLE cellulitis, improved\par 4. PVD/ h/o DVT\par cont Eliquis \par vascular follow up\par

## 2022-07-14 NOTE — REVIEW OF SYSTEMS
[Lower Ext Edema] : lower extremity edema [Dyspnea on Exertion] : dyspnea on exertion [Negative] : Heme/Lymph [FreeTextEntry1] : leg redness/ swelling

## 2022-07-14 NOTE — PHYSICAL EXAM

## 2022-07-20 LAB
ALBUMIN SERPL ELPH-MCNC: 4.3 G/DL
ALP BLD-CCNC: 90 U/L
ALT SERPL-CCNC: 7 U/L
ANION GAP SERPL CALC-SCNC: 16 MMOL/L
AST SERPL-CCNC: 10 U/L
BASOPHILS # BLD AUTO: 0.03 K/UL
BASOPHILS NFR BLD AUTO: 0.4 %
BILIRUB SERPL-MCNC: 0.2 MG/DL
BUN SERPL-MCNC: 33 MG/DL
CALCIUM SERPL-MCNC: 9.6 MG/DL
CHLORIDE SERPL-SCNC: 98 MMOL/L
CO2 SERPL-SCNC: 24 MMOL/L
CREAT SERPL-MCNC: 1.8 MG/DL
EGFR: 27 ML/MIN/1.73M2
EOSINOPHIL # BLD AUTO: 0.08 K/UL
EOSINOPHIL NFR BLD AUTO: 1 %
ESTIMATED AVERAGE GLUCOSE: 220 MG/DL
GLUCOSE SERPL-MCNC: 221 MG/DL
HBA1C MFR BLD HPLC: 9.3 %
HCT VFR BLD CALC: 45.2 %
HGB BLD-MCNC: 13.1 G/DL
IMM GRANULOCYTES NFR BLD AUTO: 0.2 %
LYMPHOCYTES # BLD AUTO: 1.16 K/UL
LYMPHOCYTES NFR BLD AUTO: 14 %
MAN DIFF?: NORMAL
MCHC RBC-ENTMCNC: 28.6 PG
MCHC RBC-ENTMCNC: 29 GM/DL
MCV RBC AUTO: 98.7 FL
MONOCYTES # BLD AUTO: 0.54 K/UL
MONOCYTES NFR BLD AUTO: 6.5 %
NEUTROPHILS # BLD AUTO: 6.45 K/UL
NEUTROPHILS NFR BLD AUTO: 77.9 %
NT-PROBNP SERPL-MCNC: 718 PG/ML
PLATELET # BLD AUTO: 301 K/UL
POTASSIUM SERPL-SCNC: 5 MMOL/L
PROT SERPL-MCNC: 7.4 G/DL
RBC # BLD: 4.58 M/UL
RBC # FLD: 16.5 %
SODIUM SERPL-SCNC: 137 MMOL/L
TSH SERPL-ACNC: 1.29 UIU/ML
WBC # FLD AUTO: 8.28 K/UL

## 2022-07-27 ENCOUNTER — APPOINTMENT (OUTPATIENT)
Dept: OPHTHALMOLOGY | Facility: AMBULATORY SURGERY CENTER | Age: 82
End: 2022-07-27

## 2022-07-28 ENCOUNTER — APPOINTMENT (OUTPATIENT)
Dept: OPHTHALMOLOGY | Facility: CLINIC | Age: 82
End: 2022-07-28

## 2022-08-02 NOTE — BRIEF OPERATIVE NOTE - NSICDXBRIEFOPLAUNCH_GEN_ALL_CORE
HPI:  86 y/o F with PMHx of CAD, NSTEMI, COPD (no supp O2), Asthma, DM2 (home insulin), Hypoparathyroidism, HTN, HLD, GERD, Major depressive disorder, s/p appendectomy presenting from [facility] with abdominal pain. History obtained from facility and family. At baseline patient is able to say "yes", "no", and use short phrases. However, on 7/28 she stopped eating, stopped speaking, and began pointing to the R side of her abdomen with pain. A RUQ ultrasound from 7/30 revealed a gallstone and hepatomegaly. Patient was brought to the ED this afternoon (7/31) given worsening of symptoms. Currently, patient understands questions but is unable to answer verbally due to medical condition. She points to RUQ and epigastric regions of abdomen as painful. Communication is limited but she denies chest pain, shortness of breath, headache, or leg pain. Notably, patient is DNR/DNI, no tube feeds (MOLST), and no blood transfusions (as per Neil Chávez - son and health proxy, and daughter-in-law).    ER Course:   Vitals: 118/63 HR 82 BPM RR 18/min T 97.6 F   Imaging: RUQ Ultrasound: Cholelithiasis without evidence of acute cholecystitis.  Labs: BUN/Cr: 73/2.00 WBC: 14.05 w/left shift   Received: morphine x 1, zofran IV x 1, 1 L normal saline bolus, zosyn x 1  (31 Jul 2022 15:27)    PERTINENT PM/SXH:   Uncomplicated asthma, unspecified asthma severity    DM2 (diabetes mellitus, type 2)    Essential hypertension    Hypothyroidism, unspecified type    Pure hypercholesterolemia    Gastroesophageal reflux disease without esophagitis    Diabetes    Asthma    CAD (coronary artery disease)    HTN (hypertension)    HLD (hyperlipidemia)    Hypothyroid    NSTEMI (non-ST elevated myocardial infarction)    Hypoparathyroidism      No significant past surgical history    History of appendectomy    History of appendectomy    Abnormal findings on cardiac catheterization      FAMILY HISTORY:  Family history of heart disease    Family history of cancer in mother    Family history of MI (myocardial infarction)    Family history of stomach cancer      ITEMS NOT CHECKED ARE NOT PRESENT    SOCIAL HISTORY:   Significant other/partner[ ]  Children[ ]  Yarsani/Spirituality:  Substance hx:  [ ]   Tobacco hx:  [ ]   Alcohol hx: [ ]   Home Opioid hx:  [ ] I-Stop Reference No:  Living Situation: [ ]Home  [ ]Long term care  [ ]Rehab [ ]Other    ADVANCE DIRECTIVES:    DNR  MOLST  [ ]  Living Will  [ ]   DECISION MAKER(s):  [ ] Health Care Proxy(s)  [ ] Surrogate(s)  [ ] Guardian           Name(s): Phone Number(s):    BASELINE (I)ADL(s) (prior to admission):  [place]: [ ]Total  [ ] Moderate [ ]Dependent    Allergies    doxycycline (Unknown)  iodine (Hives)  iodine containing compounds (Unknown)  shellfish (Anaphylaxis)  shellfish (Swelling; Short breath)    Intolerances    MEDICATIONS  (STANDING):  levothyroxine Injectable 62.5 MICROGram(s) IV Push at bedtime  pantoprazole  Injectable 40 milliGRAM(s) IV Push two times a day  piperacillin/tazobactam IVPB.. 3.375 Gram(s) IV Intermittent every 8 hours    MEDICATIONS  (PRN):  ALBUTerol    0.083% 2.5 milliGRAM(s) Nebulizer every 3 hours PRN Shortness of Breath and/or Wheezing  ondansetron Injectable 4 milliGRAM(s) IV Push every 8 hours PRN Nausea and/or Vomiting    PRESENT SYMPTOMS: [ ]Unable to obtain due to poor mentation   Source if other than patient:  [ ]Family   [ ]Team     Pain: [ ]yes [ ]no  QOL impact -   Location -                    Aggravating factors -  Quality -  Radiation -  Timing-  Severity (0-10 scale):  Minimal acceptable level (0-10 scale):     CPOT:    https://www.[facility].org/getattachment/ktb68b90-1x5j-[email or web address]-[email or web address]/Critical-Care-Pain-Observation-Tool-(CPOT)      PAIN AD Score:     http://geriatrictoolkit.[email or web address]/painad.pdf (press ctrl +  left click to view)    Dyspnea:                           [ ]Mild [ ]Moderate [ ]Severe  Anxiety:                             [ ]Mild [ ]Moderate [ ]Severe  Fatigue:                             [ ]Mild [ ]Moderate [ ]Severe  Nausea:                             [ ]Mild [ ]Moderate [ ]Severe  Loss of appetite:              [ ]Mild [ ]Moderate [ ]Severe  Constipation:                    [ ]Mild [ ]Moderate [ ]Severe    Other Symptoms:  [ ]All other review of systems negative     Palliative Performance Status Version 2:         %    http://npcrc.org/files/news/palliative_performance_scale_ppsv2.pdf  PHYSICAL EXAM:  Vital Signs Last 24 Hrs  T(C): 36.6 (02 Aug 2022 12:59), Max: 36.9 (02 Aug 2022 04:39)  T(F): 97.9 (02 Aug 2022 12:59), Max: 98.4 (02 Aug 2022 04:39)  HR: 74 (02 Aug 2022 12:59) (72 - 98)  BP: 125/57 (02 Aug 2022 12:59) (84/52 - 152/80)  BP(mean): --  RR: 20 (02 Aug 2022 12:59) (19 - 20)  SpO2: 99% (02 Aug 2022 12:59) (94% - 100%)    Parameters below as of 02 Aug 2022 12:59  Patient On (Oxygen Delivery Method): BiPAP/CPAP     I&O's Summary    GENERAL:  [ ]Alert  [ ]Oriented x   [ ]Lethargic  [ ]Cachexia  [ ]Unarousable  [ ]Verbal  [ ]Non-Verbal  Behavioral:   [ ] Anxiety  [ ] Delirium [ ] Agitation [ ] Other  HEENT:  [ ]Normal   [ ]Dry mouth   [ ]ET Tube/Trach  [ ]Oral lesions  PULMONARY:   [ ]Clear [ ]Tachypnea  [ ]Audible excessive secretions   [ ]Rhonchi        [ ]Right [ ]Left [ ]Bilateral  [ ]Crackles        [ ]Right [ ]Left [ ]Bilateral  [ ]Wheezing     [ ]Right [ ]Left [ ]Bilateral  [ ]Diminished breath sounds [ ]right [ ]left [ ]bilateral  CARDIOVASCULAR:    [ ]Regular [ ]Irregular [ ]Tachy  [ ]Yusuf [ ]Murmur [ ]Other  GASTROINTESTINAL:  [ ]Soft  [ ]Distended   [ ]+BS  [ ]Non tender [ ]Tender  [ ]PEG [ ]OGT/ NGT  Last BM:   GENITOURINARY:  [ ]Normal [ ] Incontinent   [ ]Oliguria/Anuria   [ ]Schwarz  MUSCULOSKELETAL:   [ ]Normal   [ ]Weakness  [ ]Bed/Wheelchair bound [ ]Edema  NEUROLOGIC:   [ ]No focal deficits  [ ]Cognitive impairment  [ ]Dysphagia [ ]Dysarthria [ ]Paresis [ ]Other   SKIN:   [ ]Normal    [ ]Rash  [ ]Pressure ulcer(s)       Present on admission [ ]y [ ]n    CRITICAL CARE:  [ ] Shock Present  [ ]Septic [ ]Cardiogenic [ ]Neurologic [ ]Hypovolemic  [ ]  Vasopressors [ ]  Inotropes   [ ]Respiratory failure present [ ]Mechanical ventilation [ ]Non-invasive ventilatory support [ ]High flow    [ ]Acute  [ ]Chronic [ ]Hypoxic  [ ]Hypercarbic [ ]Other  [ ]Other organ failure     LABS:                        7.3    10.77 )-----------( 185      ( 02 Aug 2022 07:16 )             24.2   08-02    146<H>  |  115<H>  |  52<H>  ----------------------------<  233<H>  4.5   |  23  |  2.30<H>    Ca    7.7<L>      02 Aug 2022 07:16    TPro  5.1<L>  /  Alb  2.3<L>  /  TBili  0.4  /  DBili  x   /  AST  115<H>  /  ALT  21  /  AlkPhos  65  08-02        RADIOLOGY & ADDITIONAL STUDIES:    PROTEIN CALORIE MALNUTRITION PRESENT: [ ]mild [ ]moderate [ ]severe [ ]underweight [ ]morbid obesity  https://www.andeal.org/vault/2440/web/files/ONC/Table_Clinical%20Characteristics%20to%20Document%20Malnutrition-White%20JV%20et%20al%197491.pdf    Height (cm): 162.6 (07-31-22 @ 11:43), 162.6 (04-19-22 @ 11:32), 162.6 (03-09-22 @ 05:36)  Weight (kg): 92.6 (06-15-22 @ 12:15), 83.9 (04-19-22 @ 11:32), 90.7 (03-09-22 @ 05:36)  BMI (kg/m2): 35 (07-31-22 @ 11:43), 35 (06-15-22 @ 12:15), 31.7 (04-19-22 @ 11:32)    [ ]PPSV2 < or = to 30% [ ]significant weight loss  [ ]poor nutritional intake  [ ]anasarca      [ ]Artificial Nutrition      REFERRALS:   [ ]Chaplaincy  [ ]Hospice  [ ]Child Life  [ ]Social Work  [ ]Case management [ ]Holistic Therapy     Goals of Care Document:  <--- Click to Launch ICDx for PreOp, PostOp and Procedure

## 2022-08-08 ENCOUNTER — APPOINTMENT (OUTPATIENT)
Dept: CARDIOLOGY | Facility: CLINIC | Age: 82
End: 2022-08-08

## 2022-08-08 VITALS
DIASTOLIC BLOOD PRESSURE: 75 MMHG | OXYGEN SATURATION: 94 % | RESPIRATION RATE: 14 BRPM | SYSTOLIC BLOOD PRESSURE: 117 MMHG | HEART RATE: 108 BPM | TEMPERATURE: 97.2 F

## 2022-08-08 DIAGNOSIS — L03.115 CELLULITIS OF RIGHT LOWER LIMB: ICD-10-CM

## 2022-08-08 PROCEDURE — 99214 OFFICE O/P EST MOD 30 MIN: CPT

## 2022-08-08 RX ORDER — TRIAMCINOLONE ACETONIDE 1 MG/G
0.1 CREAM TOPICAL DAILY
Qty: 1 | Refills: 3 | Status: ACTIVE | COMMUNITY
Start: 2022-08-08 | End: 1900-01-01

## 2022-08-08 NOTE — ASSESSMENT
[FreeTextEntry1] : Assessment:\par 1.  cellulitis - R shin - resolved\par - still with some erythema\par 2.  R arm lymphedema\par 3.  history of DVT - duplex in Jan negative for DVT, on eliquis 2.5mg BID\par \par Plan\par 1.  At this moment there does not seem to be cellulitis, will not rx antibiotics at this time\par 2.  Instead, will encourage compression garments daily \par 3.  Rx for triamcinolone cream once daily to erythematous area\par 4.  Will forgo venous duplex, she is on eliquis and had a venous duplex in Jan which was negative for DVT.\par 5.  Leg elevation, weight loss, salt avoidance.  \par

## 2022-08-08 NOTE — REASON FOR VISIT
[FreeTextEntry1] : 8/8/2022\par \par Here with daughter. \par R leg anterior shin was a bit erythematous\par was rx'd antibiotcs - doxycycline with some improvement\par The right shin is still a bit erythematous, but symptoms have much improved/resolved. \par remains on eliquis 2.5mg BID\par \par no recent venous duplex

## 2022-08-08 NOTE — PHYSICAL EXAM
[General Appearance - Well Developed] : well developed [Normal Appearance] : normal appearance [Well Groomed] : well groomed [General Appearance - Well Nourished] : well nourished [No Deformities] : no deformities [General Appearance - In No Acute Distress] : no acute distress [Normal Conjunctiva] : the conjunctiva exhibited no abnormalities [Eyelids - No Xanthelasma] : the eyelids demonstrated no xanthelasmas [Normal Oral Mucosa] : normal oral mucosa [No Oral Pallor] : no oral pallor [No Oral Cyanosis] : no oral cyanosis [Normal Jugular Venous A Waves Present] : normal jugular venous A waves present [Normal Jugular Venous V Waves Present] : normal jugular venous V waves present [No Jugular Venous Amaya A Waves] : no jugular venous amaya A waves [Respiration, Rhythm And Depth] : normal respiratory rhythm and effort [Exaggerated Use Of Accessory Muscles For Inspiration] : no accessory muscle use [Auscultation Breath Sounds / Voice Sounds] : lungs were clear to auscultation bilaterally [Heart Rate And Rhythm] : heart rate and rhythm were normal [Heart Sounds] : normal S1 and S2 [Murmurs] : no murmurs present [Abdomen Soft] : soft [Abdomen Tenderness] : non-tender [Abdomen Mass (___ Cm)] : no abdominal mass palpated [Abnormal Walk] : normal gait [Gait - Sufficient For Exercise Testing] : the gait was sufficient for exercise testing [FreeTextEntry1] : LE edema R>L.  RUE lymphedema.    right knee replacement.  Strong pedal pulses.  R shin erythema.   [Skin Color & Pigmentation] : normal skin color and pigmentation [] : no rash [No Venous Stasis] : no venous stasis [Skin Lesions] : no skin lesions [No Skin Ulcers] : no skin ulcer [No Xanthoma] : no  xanthoma was observed [Oriented To Time, Place, And Person] : oriented to person, place, and time [Affect] : the affect was normal [Mood] : the mood was normal [No Anxiety] : not feeling anxious

## 2022-08-23 ENCOUNTER — APPOINTMENT (OUTPATIENT)
Dept: INTERNAL MEDICINE | Facility: CLINIC | Age: 82
End: 2022-08-23

## 2022-08-23 VITALS
SYSTOLIC BLOOD PRESSURE: 140 MMHG | HEART RATE: 69 BPM | TEMPERATURE: 96.3 F | HEIGHT: 61 IN | WEIGHT: 197 LBS | DIASTOLIC BLOOD PRESSURE: 53 MMHG | OXYGEN SATURATION: 85 % | BODY MASS INDEX: 37.19 KG/M2 | RESPIRATION RATE: 14 BRPM

## 2022-08-23 PROCEDURE — 99214 OFFICE O/P EST MOD 30 MIN: CPT

## 2022-08-24 NOTE — PLAN
[FreeTextEntry1] : Follow-up\par \par HTN/HLD/HF/DVT history \par Follows with Dr. Hagan\par cont Amlodipine 5 mg daily\par cont Metoprolol 150 mg daily\par cont Bumetanide 1 mg daily\par cont Spironolactone 25 mg 1/2 tab daily\par cont Atorvastatin 40 mg daily\par Cont ASA 81 mg daily\par cont Eliquis 2.5 mg daily\par \par 2. Diabetes-\par Low carb, low sugar diet/ increased physical activity\par cont Januvia 25 mg daily\par cont Glimepiride 2 mg daily\par Currently not taking Ozempic \par Endocrine referral \par \par 3. CKD \par Follows with Dr. Kan ( Nephrology) every 6 months \par \par \par

## 2022-08-24 NOTE — PHYSICAL EXAM
[No Acute Distress] : no acute distress [Well Nourished] : well nourished [Normal Sclera/Conjunctiva] : normal sclera/conjunctiva [PERRL] : pupils equal round and reactive to light [EOMI] : extraocular movements intact [Normal Outer Ear/Nose] : the outer ears and nose were normal in appearance [Normal Oropharynx] : the oropharynx was normal [No JVD] : no jugular venous distention [No Lymphadenopathy] : no lymphadenopathy [Supple] : supple [Thyroid Normal, No Nodules] : the thyroid was normal and there were no nodules present [No Respiratory Distress] : no respiratory distress  [No Accessory Muscle Use] : no accessory muscle use [Clear to Auscultation] : lungs were clear to auscultation bilaterally [Normal Rate] : normal rate  [Regular Rhythm] : with a regular rhythm [Normal S1, S2] : normal S1 and S2 [No Murmur] : no murmur heard [No Carotid Bruits] : no carotid bruits [Pedal Pulses Present] : the pedal pulses are present [No Palpable Aorta] : no palpable aorta [No Extremity Clubbing/Cyanosis] : no extremity clubbing/cyanosis [Soft] : abdomen soft [Non Tender] : non-tender [Non-distended] : non-distended [Normal Bowel Sounds] : normal bowel sounds [Normal Posterior Cervical Nodes] : no posterior cervical lymphadenopathy [Normal Anterior Cervical Nodes] : no anterior cervical lymphadenopathy [No CVA Tenderness] : no CVA  tenderness [No Spinal Tenderness] : no spinal tenderness [No Joint Swelling] : no joint swelling [Grossly Normal Strength/Tone] : grossly normal strength/tone [No Rash] : no rash [Coordination Grossly Intact] : coordination grossly intact [No Focal Deficits] : no focal deficits [Normal Affect] : the affect was normal [Normal Insight/Judgement] : insight and judgment were intact [de-identified] :  + varicose/spider  vein, Left arm lymphedema  [de-identified] : + LE hyperpigmentation, dry/flaky  [de-identified] : ambulates with walker

## 2022-08-24 NOTE — HISTORY OF PRESENT ILLNESS
[de-identified] : Patient is an 82 year old female accompanied by her daughter with a history of HTN, hyperlipidemia, type 2 diabetes mellitus, breast cancer status post right partial mastectomy, rectal carcinoma status post resection, anemia, diastolic heart failure, right lower extremity DVT, CAD s/p 2 vessel CABG 11/9/2020 and renal insufficiency who presents today for follow up\par \par She feels well\par She has been doing better with her diet. She did not start the Ozempic as prescribed >wanted to try diet modification first \par She reports at home sugars run on average 200\par Denies CP, SOB, HA, N/V or abdominal pain

## 2022-08-30 LAB
ALBUMIN SERPL ELPH-MCNC: 4.2 G/DL
ALP BLD-CCNC: 92 U/L
ALT SERPL-CCNC: 10 U/L
ANION GAP SERPL CALC-SCNC: 15 MMOL/L
AST SERPL-CCNC: 11 U/L
BILIRUB SERPL-MCNC: 0.3 MG/DL
BUN SERPL-MCNC: 33 MG/DL
CALCIUM SERPL-MCNC: 9.8 MG/DL
CHLORIDE SERPL-SCNC: 102 MMOL/L
CO2 SERPL-SCNC: 25 MMOL/L
CREAT SERPL-MCNC: 1.73 MG/DL
EGFR: 29 ML/MIN/1.73M2
ESTIMATED AVERAGE GLUCOSE: 229 MG/DL
GLUCOSE SERPL-MCNC: 246 MG/DL
HBA1C MFR BLD HPLC: 9.6 %
POTASSIUM SERPL-SCNC: 4.5 MMOL/L
PROT SERPL-MCNC: 7.2 G/DL
SODIUM SERPL-SCNC: 142 MMOL/L

## 2022-09-02 NOTE — PATIENT PROFILE ADULT - FALL HARM RISK TYPE OF ASSESSMENT
Labor Progress Note:      S: NAD. VSS. Patient states she is feeling well this morning. Appetite this morning, requested a meal this morning. BP and BS well controlled. Patient has no new complaints and understands the plan for management of her labor induction today.     Patient Vitals for the past 4 hrs:   BP Pulse   22 0747 133/69 89   22 0726 136/68 89   22 0707 136/78 69   22 0646 130/69 80   22 0626 132/71 84   22 0608 (!) 140/82 93   22 0546 120/69 75   22 0526 119/67 87   22 0506 111/59 85     Gen: AAO, NAD, VSS    SVE: 5/90%/-3    FHT: Baseline 150/minimal variability/+accels/- decels  [place]: irregular contractions, 15- 20 mins    A/P: 34 y.o.  @ 38w2d by US admitted for scheduled induction of labor on 2022.     - Patient is s/p balloon placement on 2022 at 1400  - FHT: cat 1  - GBS: negative   - pain: controlled  - Anticipate       Mariah Duncan M.D., PGY1  [employer], Family Medicine Resident      admission

## 2022-09-07 ENCOUNTER — INPATIENT (INPATIENT)
Facility: HOSPITAL | Age: 82
LOS: 8 days | Discharge: HOME | DRG: 291 | End: 2022-09-16
Attending: INTERNAL MEDICINE | Admitting: GENERAL ACUTE CARE HOSPITAL
Payer: MEDICARE

## 2022-09-07 VITALS
TEMPERATURE: 99 F | HEART RATE: 64 BPM | HEIGHT: 61 IN | SYSTOLIC BLOOD PRESSURE: 112 MMHG | OXYGEN SATURATION: 97 % | DIASTOLIC BLOOD PRESSURE: 70 MMHG | RESPIRATION RATE: 18 BRPM

## 2022-09-07 DIAGNOSIS — Z98.89 OTHER SPECIFIED POSTPROCEDURAL STATES: Chronic | ICD-10-CM

## 2022-09-07 DIAGNOSIS — Z90.49 ACQUIRED ABSENCE OF OTHER SPECIFIED PARTS OF DIGESTIVE TRACT: Chronic | ICD-10-CM

## 2022-09-07 DIAGNOSIS — Z96.651 PRESENCE OF RIGHT ARTIFICIAL KNEE JOINT: Chronic | ICD-10-CM

## 2022-09-07 DIAGNOSIS — Z93.2 ILEOSTOMY STATUS: Chronic | ICD-10-CM

## 2022-09-07 DIAGNOSIS — Z90.11 ACQUIRED ABSENCE OF RIGHT BREAST AND NIPPLE: Chronic | ICD-10-CM

## 2022-09-07 DIAGNOSIS — I50.9 HEART FAILURE, UNSPECIFIED: ICD-10-CM

## 2022-09-07 LAB
ALBUMIN SERPL ELPH-MCNC: 4 G/DL — SIGNIFICANT CHANGE UP (ref 3.3–5)
ALP SERPL-CCNC: 91 U/L — SIGNIFICANT CHANGE UP (ref 40–120)
ALT FLD-CCNC: 10 U/L — SIGNIFICANT CHANGE UP (ref 10–45)
ANION GAP SERPL CALC-SCNC: 14 MMOL/L — SIGNIFICANT CHANGE UP (ref 5–17)
AST SERPL-CCNC: 17 U/L — SIGNIFICANT CHANGE UP (ref 10–40)
BASE EXCESS BLDA CALC-SCNC: 7.5 MMOL/L — HIGH (ref -2–3)
BASE EXCESS BLDV CALC-SCNC: 5.5 MMOL/L — HIGH (ref -2–3)
BASOPHILS # BLD AUTO: 0.03 K/UL — SIGNIFICANT CHANGE UP (ref 0–0.2)
BASOPHILS NFR BLD AUTO: 0.3 % — SIGNIFICANT CHANGE UP (ref 0–2)
BILIRUB SERPL-MCNC: 0.4 MG/DL — SIGNIFICANT CHANGE UP (ref 0.2–1.2)
BUN SERPL-MCNC: 24 MG/DL — HIGH (ref 7–23)
CA-I SERPL-SCNC: 1.22 MMOL/L — SIGNIFICANT CHANGE UP (ref 1.15–1.33)
CALCIUM SERPL-MCNC: 9.8 MG/DL — SIGNIFICANT CHANGE UP (ref 8.4–10.5)
CHLORIDE BLDV-SCNC: 99 MMOL/L — SIGNIFICANT CHANGE UP (ref 96–108)
CHLORIDE SERPL-SCNC: 99 MMOL/L — SIGNIFICANT CHANGE UP (ref 96–108)
CO2 BLDA-SCNC: 32 MMOL/L — HIGH (ref 19–24)
CO2 BLDV-SCNC: 33 MMOL/L — HIGH (ref 22–26)
CO2 SERPL-SCNC: 25 MMOL/L — SIGNIFICANT CHANGE UP (ref 22–31)
CREAT SERPL-MCNC: 1.41 MG/DL — HIGH (ref 0.5–1.3)
EGFR: 37 ML/MIN/1.73M2 — LOW
EOSINOPHIL # BLD AUTO: 0.03 K/UL — SIGNIFICANT CHANGE UP (ref 0–0.5)
EOSINOPHIL NFR BLD AUTO: 0.3 % — SIGNIFICANT CHANGE UP (ref 0–6)
GAS PNL BLDA: SIGNIFICANT CHANGE UP
GAS PNL BLDV: 134 MMOL/L — LOW (ref 136–145)
GAS PNL BLDV: SIGNIFICANT CHANGE UP
GLUCOSE BLDV-MCNC: 307 MG/DL — HIGH (ref 70–99)
GLUCOSE SERPL-MCNC: 279 MG/DL — HIGH (ref 70–99)
HCO3 BLDA-SCNC: 31 MMOL/L — HIGH (ref 21–28)
HCO3 BLDV-SCNC: 32 MMOL/L — HIGH (ref 22–29)
HCT VFR BLD CALC: 42.8 % — SIGNIFICANT CHANGE UP (ref 34.5–45)
HCT VFR BLDA CALC: 41 % — SIGNIFICANT CHANGE UP (ref 34.5–46.5)
HGB BLD CALC-MCNC: 13.7 G/DL — SIGNIFICANT CHANGE UP (ref 11.7–16.1)
HGB BLD-MCNC: 12.9 G/DL — SIGNIFICANT CHANGE UP (ref 11.5–15.5)
IMM GRANULOCYTES NFR BLD AUTO: 0.2 % — SIGNIFICANT CHANGE UP (ref 0–1.5)
LACTATE BLDV-MCNC: 2.6 MMOL/L — HIGH (ref 0.5–2)
LYMPHOCYTES # BLD AUTO: 1.02 K/UL — SIGNIFICANT CHANGE UP (ref 1–3.3)
LYMPHOCYTES # BLD AUTO: 11.7 % — LOW (ref 13–44)
MAGNESIUM SERPL-MCNC: 2 MG/DL — SIGNIFICANT CHANGE UP (ref 1.6–2.6)
MCHC RBC-ENTMCNC: 28.5 PG — SIGNIFICANT CHANGE UP (ref 27–34)
MCHC RBC-ENTMCNC: 30.1 GM/DL — LOW (ref 32–36)
MCV RBC AUTO: 94.5 FL — SIGNIFICANT CHANGE UP (ref 80–100)
MONOCYTES # BLD AUTO: 0.6 K/UL — SIGNIFICANT CHANGE UP (ref 0–0.9)
MONOCYTES NFR BLD AUTO: 6.9 % — SIGNIFICANT CHANGE UP (ref 2–14)
NEUTROPHILS # BLD AUTO: 7.05 K/UL — SIGNIFICANT CHANGE UP (ref 1.8–7.4)
NEUTROPHILS NFR BLD AUTO: 80.6 % — HIGH (ref 43–77)
NRBC # BLD: 0 /100 WBCS — SIGNIFICANT CHANGE UP (ref 0–0)
NT-PROBNP SERPL-SCNC: 1038 PG/ML — HIGH (ref 0–300)
PCO2 BLDA: 39 MMHG — SIGNIFICANT CHANGE UP (ref 32–45)
PCO2 BLDV: 51 MMHG — HIGH (ref 39–42)
PH BLDA: 7.51 — HIGH (ref 7.35–7.45)
PH BLDV: 7.4 — SIGNIFICANT CHANGE UP (ref 7.32–7.43)
PLATELET # BLD AUTO: 268 K/UL — SIGNIFICANT CHANGE UP (ref 150–400)
PO2 BLDA: 100 MMHG — SIGNIFICANT CHANGE UP (ref 83–108)
PO2 BLDV: 47 MMHG — HIGH (ref 25–45)
POTASSIUM BLDV-SCNC: 4.6 MMOL/L — SIGNIFICANT CHANGE UP (ref 3.5–5.1)
POTASSIUM SERPL-MCNC: 5 MMOL/L — SIGNIFICANT CHANGE UP (ref 3.5–5.3)
POTASSIUM SERPL-SCNC: 5 MMOL/L — SIGNIFICANT CHANGE UP (ref 3.5–5.3)
PROT SERPL-MCNC: 7.8 G/DL — SIGNIFICANT CHANGE UP (ref 6–8.3)
RAPID RVP RESULT: SIGNIFICANT CHANGE UP
RBC # BLD: 4.53 M/UL — SIGNIFICANT CHANGE UP (ref 3.8–5.2)
RBC # FLD: 15.5 % — HIGH (ref 10.3–14.5)
SAO2 % BLDA: 97.8 % — SIGNIFICANT CHANGE UP (ref 94–98)
SAO2 % BLDV: 76.4 % — SIGNIFICANT CHANGE UP (ref 67–88)
SARS-COV-2 RNA SPEC QL NAA+PROBE: SIGNIFICANT CHANGE UP
SARS-COV-2 RNA SPEC QL NAA+PROBE: SIGNIFICANT CHANGE UP
SODIUM SERPL-SCNC: 138 MMOL/L — SIGNIFICANT CHANGE UP (ref 135–145)
TROPONIN T, HIGH SENSITIVITY RESULT: 21 NG/L — SIGNIFICANT CHANGE UP (ref 0–51)
TROPONIN T, HIGH SENSITIVITY RESULT: 24 NG/L — SIGNIFICANT CHANGE UP (ref 0–51)
WBC # BLD: 8.75 K/UL — SIGNIFICANT CHANGE UP (ref 3.8–10.5)
WBC # FLD AUTO: 8.75 K/UL — SIGNIFICANT CHANGE UP (ref 3.8–10.5)

## 2022-09-07 PROCEDURE — 71250 CT THORAX DX C-: CPT | Mod: 26

## 2022-09-07 PROCEDURE — 99285 EMERGENCY DEPT VISIT HI MDM: CPT | Mod: FS,25

## 2022-09-07 PROCEDURE — 93010 ELECTROCARDIOGRAM REPORT: CPT

## 2022-09-07 PROCEDURE — 71045 X-RAY EXAM CHEST 1 VIEW: CPT | Mod: 26

## 2022-09-07 RX ORDER — ATORVASTATIN CALCIUM 80 MG/1
40 TABLET, FILM COATED ORAL AT BEDTIME
Refills: 0 | Status: DISCONTINUED | OUTPATIENT
Start: 2022-09-07 | End: 2022-09-16

## 2022-09-07 RX ORDER — BUMETANIDE 0.25 MG/ML
1 INJECTION INTRAMUSCULAR; INTRAVENOUS ONCE
Refills: 0 | Status: COMPLETED | OUTPATIENT
Start: 2022-09-07 | End: 2022-09-07

## 2022-09-07 RX ORDER — METOPROLOL TARTRATE 50 MG
150 TABLET ORAL DAILY
Refills: 0 | Status: DISCONTINUED | OUTPATIENT
Start: 2022-09-07 | End: 2022-09-16

## 2022-09-07 RX ORDER — PREGABALIN 225 MG/1
1 CAPSULE ORAL
Qty: 0 | Refills: 0 | DISCHARGE

## 2022-09-07 RX ORDER — BUMETANIDE 0.25 MG/ML
2 INJECTION INTRAMUSCULAR; INTRAVENOUS
Refills: 0 | Status: DISCONTINUED | OUTPATIENT
Start: 2022-09-07 | End: 2022-09-07

## 2022-09-07 RX ORDER — APIXABAN 2.5 MG/1
2.5 TABLET, FILM COATED ORAL
Refills: 0 | Status: DISCONTINUED | OUTPATIENT
Start: 2022-09-07 | End: 2022-09-16

## 2022-09-07 RX ORDER — ASPIRIN/CALCIUM CARB/MAGNESIUM 324 MG
81 TABLET ORAL DAILY
Refills: 0 | Status: DISCONTINUED | OUTPATIENT
Start: 2022-09-07 | End: 2022-09-16

## 2022-09-07 RX ADMIN — ATORVASTATIN CALCIUM 40 MILLIGRAM(S): 80 TABLET, FILM COATED ORAL at 22:52

## 2022-09-07 RX ADMIN — BUMETANIDE 1 MILLIGRAM(S): 0.25 INJECTION INTRAMUSCULAR; INTRAVENOUS at 14:33

## 2022-09-07 NOTE — ED PROVIDER NOTE - CLINICAL SUMMARY MEDICAL DECISION MAKING FREE TEXT BOX
Attending MD Camargo: 83 yo F wit a PMH of breast CA, DM, HTN, HLD, CHF, chronic RUE lymphedema p/w substernal chest pain, SOB and worsening bilateral lower extremity swelling x 2 days.  Some scattered wheezing on exam, BLE edema.  EKG with no acute ST segment changes.  Needs admission for diuresis.  Plan: Cardiac Monitor, EKG, Labs/cardiac enzymes, CXR, diuretics and admit to telemetry for further workup.

## 2022-09-07 NOTE — ED ADULT NURSE NOTE - OBJECTIVE STATEMENT
81 y/o female with hx of CHF and DM presents to ED with c/o intermittent mid-chest pain and worsening SOB onset 2 days ago. Pt reports the pain begins when she tries to take a deep breath which then causes the SOB. Pt is a&ox3, spontaneous respirations and equal chest rise. NSR on cardiac monitor. She denies current chest pain. Belly breathing noted. Pt is on RA with good O2 sat. She also reports bilateral flank pain, LLQ pain and new abdominal swelling. Some nonpitting edema noted to pt's BLE. Pt's daughter unknown if this BLE is not her norm. Pt denies cough, fever, chills, n/v/d, dysuria, hematuria. Bed locked and in lowest position, call bell within reach and side rails up for safety. Daughter at bedside. Will continue to closely monitor pt.

## 2022-09-07 NOTE — ED PROVIDER NOTE - ATTENDING CONTRIBUTION TO CARE
Attending MD Camargo:   I personally have seen and examined this patient.  Physician assistant note reviewed and agree on plan of care and except where noted.  Please see my MDM for further details.

## 2022-09-07 NOTE — ED PROVIDER NOTE - PHYSICAL EXAMINATION
CONSTITUTIONAL: Well appearing and in no apparent distress.  ENT: Airway patent, moist mucous membranes.   EYES: Pupils equal, round and reactive to light. EOMI. Conjunctiva normal appearing.   CARDIAC: Normal rate, regular rhythm.  Heart sounds S1, S2.    RESPIRATORY: Mild wheeze on exam, ?cardiac. No resp distress, O2 sat 98% on RA. No accessory muscle use. Some increased WOB with exertion/prolonged dialogue.   CHEST: sp R mastectomy  GASTROINTESTINAL: Abdomen soft, non-tender, not distended.  MUSCULOSKELETAL: Spine appears normal. Chronic RUE lymphedema.  BLE edema.  NEUROLOGICAL: Alert and oriented x3, no focal deficits, no motor or sensory deficits. 5/5 muscle strength throughout.  SKIN: Skin normal color, warm, dry and intact.   PSYCHIATRIC: Normal mood and affect.

## 2022-09-07 NOTE — ED PROVIDER NOTE - NS ED ATTENDING STATEMENT MOD
I have seen and examined this patient and fully participated in the care of this patient as the teaching attending.  The service was shared with the ZANE.  I reviewed and verified the documentation and independently performed the documented:

## 2022-09-07 NOTE — ED PROVIDER NOTE - QRS
Call the [facility] [phone] at any time for any of the followin.  Fevers > 100.5  2.  Symptoms of infection  3.  Uncontrolled nausea or vomiting  4.  Bleeding events or unexplained bruising.  5.  New or uncontrolled pain  6.  Other unusual symptoms or concerns.    CT Scheduled for 2017 at 2:00pm, arrival time of 1:00pm    
86

## 2022-09-07 NOTE — H&P ADULT - ASSESSMENT
81 yo F wit a PMH of breast CA, DM, HTN, HLD, CHF, chronic RUE lymphedema p/w substernal/epigastric pain, SOB and worsening bilateral lower extremity swelling x 2 days.    pain is localized , non exertional , without palpitations , sob on minimal exertion  no fever /chills   no cough   no N/V/D   complaint with her meds including diuretics and eliquis     Dyspnea: multifactorial   ACS ruled out   admit to tele   start diuresis for acute on chronic Diastolic CHF   cardio consult     DM: monitor FS

## 2022-09-07 NOTE — PATIENT PROFILE ADULT - FALL HARM RISK - HARM RISK INTERVENTIONS

## 2022-09-07 NOTE — ED ADULT NURSE REASSESSMENT NOTE - NS ED NURSE REASSESS COMMENT FT1
Pt a&ox3, spontaneous respirations and equal chest rise. NSR on cardiac monitor. VSS on RA. Spo2 WNL on RA. Pt incontinent of urine. RN and EDT cleaned and repositioned pt. Bed locked and in lowest position, call bell within reach and side rails up for safety. Daughter still at bedside. Will continue to closely monitor pt.

## 2022-09-07 NOTE — ED PROVIDER NOTE - OBJECTIVE STATEMENT
81 yo F wit a PMH of breast CA, DM, HTN, HLD, CHF, chronic RUE lymphedema p/w substernal chest pain, SOB and worsening bilateral lower extremity swelling x 2 days. Daughter states sxs similar to prior HF exacerbations. Pt compliant with bumex 1mg daily. Took morning meds today. Was advised by EMS to take ASA x4 which she did and states resolution in her chest pain. Pain is pressure like, not pleuritic. Does endorse that she feels as though she cant take a good breath. Also reports feeling like her abdomen in swollen, no abd pain. Denies nausea, vomiting, fever, chills, palpitations, cough, diarrhea, headache, dizziness, weakness. Follows with [facility], Dr. Hagan.   Last admission 01/2022 for RUE cellulitis and HF exacerbation. Was tx with IV abx and diuresis.   ECHO 3/2021 revealed grade II diastolic dsfxn and severe pHTN

## 2022-09-07 NOTE — ED PROVIDER NOTE - PROGRESS NOTE DETAILS
Spoke with Cards, Dr. Hagan, aware pt is in ED  States she was last in his office for routine appt 2mo ago and advised pt she needed to increase dose of bumex. Recommended at least twice a week pt take bumex 1mg BID. Pt was not agreeable, did not make the medication adjustment. Nancy Sadler PA-C Spoke with Dr. Rodas who accepted pt for admission  Nancy Sadler PA-C

## 2022-09-07 NOTE — H&P ADULT - HISTORY OF PRESENT ILLNESS
81 yo F wit a PMH of breast CA, DM, HTN, HLD, CHF, chronic RUE lymphedema p/w substernal chest pain, SOB and worsening bilateral lower extremity swelling x 2 days. Daughter states sxs similar to prior HF exacerbations. Pt compliant with bumex 1mg daily. Took morning meds today. Was advised by EMS to take ASA x4 which she did and states resolution in her chest pain. Pain is pressure like, not pleuritic. Does endorse that she feels as though she cant take a good breath. Also reports feeling like her abdomen in swollen, no abd pain. Denies nausea, vomiting, fever, chills, palpitations, cough, diarrhea, headache, dizziness, weakness. Follows with [facility], Dr. Hagan.   	Last admission 01/2022 for RUE cellulitis and HF exacerbation. Was tx with IV abx and diuresis.   ECHO 3/2021 revealed grade II diastolic dsfxn and severe pHTN   83 yo F wit a PMH of breast CA, DM, HTN, HLD, CHF, chronic RUE lymphedema p/w substernal/epigastric pain, SOB and worsening bilateral lower extremity swelling x 2 days.    pain is localized , non exertional , without palpitations , sob on minimal exertion  no fever /chills   no cough   no N/V/D   complaint with her meds including diuretics and eliquis

## 2022-09-08 DIAGNOSIS — E11.65 TYPE 2 DIABETES MELLITUS WITH HYPERGLYCEMIA: ICD-10-CM

## 2022-09-08 DIAGNOSIS — N13.30 UNSPECIFIED HYDRONEPHROSIS: ICD-10-CM

## 2022-09-08 DIAGNOSIS — Z29.9 ENCOUNTER FOR PROPHYLACTIC MEASURES, UNSPECIFIED: ICD-10-CM

## 2022-09-08 DIAGNOSIS — I50.33 ACUTE ON CHRONIC DIASTOLIC (CONGESTIVE) HEART FAILURE: ICD-10-CM

## 2022-09-08 DIAGNOSIS — E78.5 HYPERLIPIDEMIA, UNSPECIFIED: ICD-10-CM

## 2022-09-08 DIAGNOSIS — N18.30 CHRONIC KIDNEY DISEASE, STAGE 3 UNSPECIFIED: ICD-10-CM

## 2022-09-08 LAB
A1C WITH ESTIMATED AVERAGE GLUCOSE RESULT: 9.4 % — HIGH (ref 4–5.6)
ALBUMIN SERPL ELPH-MCNC: 4.3 G/DL — SIGNIFICANT CHANGE UP (ref 3.3–5)
ALP SERPL-CCNC: 99 U/L — SIGNIFICANT CHANGE UP (ref 40–120)
ALT FLD-CCNC: 9 U/L — LOW (ref 10–45)
ANION GAP SERPL CALC-SCNC: 15 MMOL/L — SIGNIFICANT CHANGE UP (ref 5–17)
APPEARANCE UR: ABNORMAL
AST SERPL-CCNC: 14 U/L — SIGNIFICANT CHANGE UP (ref 10–40)
BACTERIA # UR AUTO: ABNORMAL
BASE EXCESS BLDA CALC-SCNC: 7.5 MMOL/L — HIGH (ref -2–3)
BILIRUB SERPL-MCNC: 0.5 MG/DL — SIGNIFICANT CHANGE UP (ref 0.2–1.2)
BILIRUB UR-MCNC: NEGATIVE — SIGNIFICANT CHANGE UP
BUN SERPL-MCNC: 27 MG/DL — HIGH (ref 7–23)
CALCIUM SERPL-MCNC: 10 MG/DL — SIGNIFICANT CHANGE UP (ref 8.4–10.5)
CHLORIDE SERPL-SCNC: 97 MMOL/L — SIGNIFICANT CHANGE UP (ref 96–108)
CO2 BLDA-SCNC: 35 MMOL/L — HIGH (ref 19–24)
CO2 SERPL-SCNC: 26 MMOL/L — SIGNIFICANT CHANGE UP (ref 22–31)
COLOR SPEC: YELLOW — SIGNIFICANT CHANGE UP
CREAT ?TM UR-MCNC: 163 MG/DL — SIGNIFICANT CHANGE UP
CREAT SERPL-MCNC: 1.51 MG/DL — HIGH (ref 0.5–1.3)
DIFF PNL FLD: ABNORMAL
EGFR: 34 ML/MIN/1.73M2 — LOW
EPI CELLS # UR: 3 /HPF — SIGNIFICANT CHANGE UP
ESTIMATED AVERAGE GLUCOSE: 223 MG/DL — HIGH (ref 68–114)
GAS PNL BLDA: SIGNIFICANT CHANGE UP
GLUCOSE BLDC GLUCOMTR-MCNC: 195 MG/DL — HIGH (ref 70–99)
GLUCOSE BLDC GLUCOMTR-MCNC: 268 MG/DL — HIGH (ref 70–99)
GLUCOSE BLDC GLUCOMTR-MCNC: 341 MG/DL — HIGH (ref 70–99)
GLUCOSE SERPL-MCNC: 322 MG/DL — HIGH (ref 70–99)
GLUCOSE UR QL: ABNORMAL
HCO3 BLDA-SCNC: 33 MMOL/L — HIGH (ref 21–28)
HCT VFR BLD CALC: 49.3 % — HIGH (ref 34.5–45)
HGB BLD-MCNC: 14.9 G/DL — SIGNIFICANT CHANGE UP (ref 11.5–15.5)
HYALINE CASTS # UR AUTO: 5 /LPF — HIGH (ref 0–2)
KETONES UR-MCNC: NEGATIVE — SIGNIFICANT CHANGE UP
LEUKOCYTE ESTERASE UR-ACNC: ABNORMAL
MAGNESIUM SERPL-MCNC: 2 MG/DL — SIGNIFICANT CHANGE UP (ref 1.6–2.6)
MCHC RBC-ENTMCNC: 28.6 PG — SIGNIFICANT CHANGE UP (ref 27–34)
MCHC RBC-ENTMCNC: 30.2 GM/DL — LOW (ref 32–36)
MCV RBC AUTO: 94.6 FL — SIGNIFICANT CHANGE UP (ref 80–100)
NITRITE UR-MCNC: POSITIVE
NRBC # BLD: 0 /100 WBCS — SIGNIFICANT CHANGE UP (ref 0–0)
PCO2 BLDA: 50 MMHG — HIGH (ref 32–45)
PH BLDA: 7.43 — SIGNIFICANT CHANGE UP (ref 7.35–7.45)
PH UR: 6 — SIGNIFICANT CHANGE UP (ref 5–8)
PHOSPHATE SERPL-MCNC: 3.2 MG/DL — SIGNIFICANT CHANGE UP (ref 2.5–4.5)
PLATELET # BLD AUTO: 284 K/UL — SIGNIFICANT CHANGE UP (ref 150–400)
PO2 BLDA: 49 MMHG — CRITICAL LOW (ref 83–108)
POTASSIUM SERPL-MCNC: 4.3 MMOL/L — SIGNIFICANT CHANGE UP (ref 3.5–5.3)
POTASSIUM SERPL-SCNC: 4.3 MMOL/L — SIGNIFICANT CHANGE UP (ref 3.5–5.3)
PROT ?TM UR-MCNC: 100 MG/DL — HIGH (ref 0–12)
PROT ?TM UR-MCNC: 106 MG/DL — HIGH (ref 0–12)
PROT SERPL-MCNC: 8.4 G/DL — HIGH (ref 6–8.3)
PROT UR-MCNC: 100 — SIGNIFICANT CHANGE UP
PROT/CREAT UR-RTO: 0.6 RATIO — HIGH (ref 0–0.2)
RBC # BLD: 5.21 M/UL — HIGH (ref 3.8–5.2)
RBC # FLD: 15.4 % — HIGH (ref 10.3–14.5)
RBC CASTS # UR COMP ASSIST: 4 /HPF — SIGNIFICANT CHANGE UP (ref 0–4)
SAO2 % BLDA: 81.6 % — LOW (ref 94–98)
SODIUM SERPL-SCNC: 138 MMOL/L — SIGNIFICANT CHANGE UP (ref 135–145)
SP GR SPEC: 1.02 — SIGNIFICANT CHANGE UP (ref 1.01–1.02)
UROBILINOGEN FLD QL: NEGATIVE — SIGNIFICANT CHANGE UP
WBC # BLD: 10.34 K/UL — SIGNIFICANT CHANGE UP (ref 3.8–10.5)
WBC # FLD AUTO: 10.34 K/UL — SIGNIFICANT CHANGE UP (ref 3.8–10.5)
WBC UR QL: 122 /HPF — HIGH (ref 0–5)

## 2022-09-08 PROCEDURE — 76770 US EXAM ABDO BACK WALL COMP: CPT | Mod: 26

## 2022-09-08 PROCEDURE — 99233 SBSQ HOSP IP/OBS HIGH 50: CPT

## 2022-09-08 RX ORDER — DEXTROSE 50 % IN WATER 50 %
15 SYRINGE (ML) INTRAVENOUS ONCE
Refills: 0 | Status: DISCONTINUED | OUTPATIENT
Start: 2022-09-08 | End: 2022-09-16

## 2022-09-08 RX ORDER — SODIUM CHLORIDE 9 MG/ML
1000 INJECTION, SOLUTION INTRAVENOUS
Refills: 0 | Status: DISCONTINUED | OUTPATIENT
Start: 2022-09-08 | End: 2022-09-16

## 2022-09-08 RX ORDER — INSULIN LISPRO 100/ML
VIAL (ML) SUBCUTANEOUS AT BEDTIME
Refills: 0 | Status: DISCONTINUED | OUTPATIENT
Start: 2022-09-08 | End: 2022-09-16

## 2022-09-08 RX ORDER — DEXTROSE 50 % IN WATER 50 %
25 SYRINGE (ML) INTRAVENOUS ONCE
Refills: 0 | Status: DISCONTINUED | OUTPATIENT
Start: 2022-09-08 | End: 2022-09-16

## 2022-09-08 RX ORDER — DEXTROSE 50 % IN WATER 50 %
12.5 SYRINGE (ML) INTRAVENOUS ONCE
Refills: 0 | Status: DISCONTINUED | OUTPATIENT
Start: 2022-09-08 | End: 2022-09-16

## 2022-09-08 RX ORDER — INSULIN LISPRO 100/ML
VIAL (ML) SUBCUTANEOUS
Refills: 0 | Status: DISCONTINUED | OUTPATIENT
Start: 2022-09-08 | End: 2022-09-16

## 2022-09-08 RX ORDER — ACETAMINOPHEN 500 MG
650 TABLET ORAL ONCE
Refills: 0 | Status: COMPLETED | OUTPATIENT
Start: 2022-09-08 | End: 2022-09-08

## 2022-09-08 RX ORDER — INSULIN GLARGINE 100 [IU]/ML
15 INJECTION, SOLUTION SUBCUTANEOUS AT BEDTIME
Refills: 0 | Status: DISCONTINUED | OUTPATIENT
Start: 2022-09-08 | End: 2022-09-09

## 2022-09-08 RX ORDER — GABAPENTIN 400 MG/1
200 CAPSULE ORAL THREE TIMES A DAY
Refills: 0 | Status: DISCONTINUED | OUTPATIENT
Start: 2022-09-08 | End: 2022-09-16

## 2022-09-08 RX ORDER — AMLODIPINE BESYLATE 2.5 MG/1
5 TABLET ORAL DAILY
Refills: 0 | Status: DISCONTINUED | OUTPATIENT
Start: 2022-09-08 | End: 2022-09-16

## 2022-09-08 RX ORDER — LANOLIN ALCOHOL/MO/W.PET/CERES
3 CREAM (GRAM) TOPICAL AT BEDTIME
Refills: 0 | Status: DISCONTINUED | OUTPATIENT
Start: 2022-09-08 | End: 2022-09-16

## 2022-09-08 RX ORDER — BUMETANIDE 0.25 MG/ML
1 INJECTION INTRAMUSCULAR; INTRAVENOUS EVERY 12 HOURS
Refills: 0 | Status: DISCONTINUED | OUTPATIENT
Start: 2022-09-08 | End: 2022-09-13

## 2022-09-08 RX ORDER — GLUCAGON INJECTION, SOLUTION 0.5 MG/.1ML
1 INJECTION, SOLUTION SUBCUTANEOUS ONCE
Refills: 0 | Status: DISCONTINUED | OUTPATIENT
Start: 2022-09-08 | End: 2022-09-16

## 2022-09-08 RX ORDER — SPIRONOLACTONE 25 MG/1
12.5 TABLET, FILM COATED ORAL DAILY
Refills: 0 | Status: DISCONTINUED | OUTPATIENT
Start: 2022-09-08 | End: 2022-09-16

## 2022-09-08 RX ORDER — FAMOTIDINE 10 MG/ML
20 INJECTION INTRAVENOUS DAILY
Refills: 0 | Status: DISCONTINUED | OUTPATIENT
Start: 2022-09-08 | End: 2022-09-16

## 2022-09-08 RX ORDER — INSULIN GLARGINE 100 [IU]/ML
10 INJECTION, SOLUTION SUBCUTANEOUS AT BEDTIME
Refills: 0 | Status: DISCONTINUED | OUTPATIENT
Start: 2022-09-08 | End: 2022-09-08

## 2022-09-08 RX ADMIN — Medication 150 MILLIGRAM(S): at 06:02

## 2022-09-08 RX ADMIN — BUMETANIDE 1 MILLIGRAM(S): 0.25 INJECTION INTRAMUSCULAR; INTRAVENOUS at 17:14

## 2022-09-08 RX ADMIN — AMLODIPINE BESYLATE 5 MILLIGRAM(S): 2.5 TABLET ORAL at 13:20

## 2022-09-08 RX ADMIN — SPIRONOLACTONE 12.5 MILLIGRAM(S): 25 TABLET, FILM COATED ORAL at 13:20

## 2022-09-08 RX ADMIN — APIXABAN 2.5 MILLIGRAM(S): 2.5 TABLET, FILM COATED ORAL at 06:02

## 2022-09-08 RX ADMIN — Medication 4: at 12:14

## 2022-09-08 RX ADMIN — Medication 650 MILLIGRAM(S): at 22:33

## 2022-09-08 RX ADMIN — Medication 81 MILLIGRAM(S): at 12:14

## 2022-09-08 RX ADMIN — Medication 1: at 21:23

## 2022-09-08 RX ADMIN — Medication 650 MILLIGRAM(S): at 23:10

## 2022-09-08 RX ADMIN — GABAPENTIN 200 MILLIGRAM(S): 400 CAPSULE ORAL at 13:22

## 2022-09-08 RX ADMIN — GABAPENTIN 200 MILLIGRAM(S): 400 CAPSULE ORAL at 21:23

## 2022-09-08 RX ADMIN — APIXABAN 2.5 MILLIGRAM(S): 2.5 TABLET, FILM COATED ORAL at 17:13

## 2022-09-08 RX ADMIN — Medication 1: at 17:13

## 2022-09-08 RX ADMIN — INSULIN GLARGINE 15 UNIT(S): 100 INJECTION, SOLUTION SUBCUTANEOUS at 21:24

## 2022-09-08 RX ADMIN — ATORVASTATIN CALCIUM 40 MILLIGRAM(S): 80 TABLET, FILM COATED ORAL at 21:23

## 2022-09-08 RX ADMIN — FAMOTIDINE 20 MILLIGRAM(S): 10 INJECTION INTRAVENOUS at 12:16

## 2022-09-08 NOTE — PROGRESS NOTE ADULT - PROBLEM SELECTOR PLAN 3
CT showing "Suggestion of chronic and severe right-sided hydronephrosis as on 11/3/2020 CT chest"  -May be a source of worsening ckd  -obtain renal US and if severe R hydronephrosis is present, would consult urology for stent placement

## 2022-09-08 NOTE — PROGRESS NOTE ADULT - NSPROGADDITIONALINFOA_GEN_ALL_CORE
d/w     Libby Lozano MD  [facility] Division of Hospital Medicine  Available via MS Teams  Pager: [phone] d/w acp     Libby Lozano MD  [facility] Division of Hospital Medicine  Available via MS Teams  Pager: [phone]

## 2022-09-08 NOTE — PROGRESS NOTE ADULT - PROBLEM SELECTOR PLAN 2
A1c on Aug 23, 2022 (outpatient records) was 9.6  -given uncontrolled diabetes, will likely need to be started on insulin as an outpatient  -Lantus 10 + ISS, adjust according  -nutrition and diabetic teaching  -gabapentin for neuropathy (pt takes ER, but here will do 200 TID) A1c on Aug 23, 2022 (outpatient records) was 9.6  -given uncontrolled diabetes, will likely need to be started on insulin as an outpatient  -Lantus 10 + ISS, adjust according  -nutrition and diabetic teaching  -gabapentin for neuropathy (pt takes ER, but here will do 200 TID)  -endocrinology consult given worsening A1c, neuropathy and likely needs to be newly started on insulin as an outpatient  -f/u urine protein/cr and possibly start ace/arb

## 2022-09-08 NOTE — PROGRESS NOTE ADULT - PROBLEM SELECTOR PLAN 4
GFR 37%  -see's dr. Kan outpatient, will consult group  -strict Is/Os  -avoid nephrotoxic agents  -renally dose all medications GFR 37%  -see's dr. Kan outpatient, consulted [facility]  -strict Is/Os  -avoid nephrotoxic agents  -renally dose all medications  -f/u urine protein/cr

## 2022-09-08 NOTE — OCCUPATIONAL THERAPY INITIAL EVALUATION ADULT - PERTINENT HX OF CURRENT PROBLEM, REHAB EVAL
82yof w/ hx of HTN, hyperlipidemia, type 2 diabetes mellitus, breast cancer status post right partial mastectomy, rectal carcinoma status post resection, anemia, diastolic heart failure, hx of right lower extremity DVT, CAD s/p 2 vessel CABG 11/9/2020 and renal insufficiency who was admitted for volume overload.

## 2022-09-08 NOTE — PROGRESS NOTE ADULT - SUBJECTIVE AND OBJECTIVE BOX
[facility] Division of Hospital Medicine  Libby Lozano MD  Available via MS Teams  Pager: [phone]    SUBJECTIVE / OVERNIGHT EVENTS:  [place]  [place] [id number]  No acute events overnight. Patient reports that her shortness of breath has improved and she feels 'much better" than before. Denies any chest pain, abdominal pain, nausea, vomiting, headaches, joint pains.     ADDITIONAL REVIEW OF SYSTEMS:    MEDICATIONS  (STANDING):  amLODIPine   Tablet 5 milliGRAM(s) Oral daily  apixaban 2.5 milliGRAM(s) Oral two times a day  aspirin  chewable 81 milliGRAM(s) Oral daily  atorvastatin 40 milliGRAM(s) Oral at bedtime  buMETAnide Injectable 1 milliGRAM(s) IV Push every 12 hours  dextrose 5%. 1000 milliLiter(s) (50 mL/Hr) IV Continuous <Continuous>  dextrose 5%. 1000 milliLiter(s) (100 mL/Hr) IV Continuous <Continuous>  dextrose 50% Injectable 25 Gram(s) IV Push once  dextrose 50% Injectable 12.5 Gram(s) IV Push once  dextrose 50% Injectable 25 Gram(s) IV Push once  famotidine    Tablet 20 milliGRAM(s) Oral daily  gabapentin 200 milliGRAM(s) Oral three times a day  glucagon  Injectable 1 milliGRAM(s) IntraMuscular once  insulin lispro (ADMELOG) corrective regimen sliding scale   SubCutaneous three times a day before meals  insulin lispro (ADMELOG) corrective regimen sliding scale   SubCutaneous at bedtime  metoprolol succinate  milliGRAM(s) Oral daily  spironolactone 12.5 milliGRAM(s) Oral daily    MEDICATIONS  (PRN):  dextrose Oral Gel 15 Gram(s) Oral once PRN Blood Glucose LESS THAN 70 milliGRAM(s)/deciliter  melatonin 3 milliGRAM(s) Oral at bedtime PRN Insomnia      I&O's Summary    07 Sep 2022 07:01  -  08 Sep 2022 07:00  --------------------------------------------------------  IN: 200 mL / OUT: 0 mL / NET: 200 mL    08 Sep 2022 07:01  -  08 Sep 2022 11:10  --------------------------------------------------------  IN: 240 mL / OUT: 400 mL / NET: -160 mL        PHYSICAL EXAM:  Vital Signs Last 24 Hrs  T(C): 36.7 (08 Sep 2022 09:20), Max: 36.9 (07 Sep 2022 15:30)  T(F): 98 (08 Sep 2022 09:20), Max: 98.5 (07 Sep 2022 15:30)  HR: 68 (08 Sep 2022 09:20) (62 - 69)  BP: 115/61 (08 Sep 2022 09:20) (115/61 - 166/80)  BP(mean): 92 (07 Sep 2022 15:30) (92 - 92)  RR: 18 (08 Sep 2022 09:20) (18 - 20)  SpO2: 96% (08 Sep 2022 09:20) (94% - 97%)    Parameters below as of 08 Sep 2022 09:20  Patient On (Oxygen Delivery Method): room air      CONSTITUTIONAL: NAD, well-developed, well-groomed  EYES: PERRLA; conjunctiva and sclera clear  ENMT: Moist oral mucosa, no pharyngeal injection or exudates; normal dentition  NECK: Supple, no palpable masses; no jvd  RESPIRATORY: Normal respiratory effort; lungs are clear to auscultation bilaterally  CARDIOVASCULAR: Regular rate and rhythm, normal S1 and S2, no murmur/rub/gallop; Peripheral pulses are 2+ bilaterally. 1+ pitting edema half-way up to shins  ABDOMEN: Nontender to palpation, normoactive bowel sounds, no rebound/guarding; No hepatosplenomegaly  MUSCULOSKELETAL:  Normal gait; no clubbing or cyanosis of digits; no joint swelling or tenderness to palpation. RUE lymphedema   PSYCH: A+O to person, place, and time; affect appropriate  NEUROLOGY: CN 2-12 are intact and symmetric; no gross sensory deficits   SKIN: No rashes; no palpable lesions    LABS:                        12.9   8.75  )-----------( 268      ( 07 Sep 2022 12:17 )             42.8     09-07    138  |  99  |  24<H>  ----------------------------<  279<H>  5.0   |  25  |  1.41<H>    Ca    9.8      07 Sep 2022 12:17  Mg     2.0     09-07    TPro  7.8  /  Alb  4.0  /  TBili  0.4  /  DBili  x   /  AST  17  /  ALT  10  /  AlkPhos  91  09-07      SARS-CoV-2: NotDetec (07 Sep 2022 19:04)  COVID-19 PCR: NotDetec (07 Sep 2022 12:16)      RADIOLOGY & ADDITIONAL TESTS:  New Results Reviewed Today: cbc cmp  New Imaging Personally Reviewed Today: n/a  New Electrocardiogram Personally Reviewed Today: n/a

## 2022-09-08 NOTE — PROGRESS NOTE ADULT - ASSESSMENT
Patient is an 82yof w/ hx of HTN, hyperlipidemia, type 2 diabetes mellitus, breast cancer status post right partial mastectomy, rectal carcinoma status post resection, anemia, diastolic heart failure, hx of right lower extremity DVT, CAD s/p 2 vessel CABG 11/9/2020 and renal insufficiency who was admitted for volume overload.

## 2022-09-08 NOTE — CONSULT NOTE ADULT - SUBJECTIVE AND OBJECTIVE BOX
Patient is a 82y old  Female who presents with a chief complaint of volume overload (08 Sep 2022 11:10)      HPI:  83 yo F wit a PMH of breast CA, DM, HTN, HLD, CHF, chronic RUE lymphedema p/w substernal/epigastric pain, SOB and worsening bilateral lower extremity swelling x 2 days.    pain is localized , non exertional , without palpitations , sob on minimal exertion  no fever /chills   no cough   no N/V/D   complaint with her meds including diuretics and eliquis      (07 Sep 2022 18:08)      PAST MEDICAL & SURGICAL HISTORY:  Breast CA, right   s/p mastectomy ,IV Chemotherapy  HTN (hypertension)  Lymphedema of arm  right arm s/p mastectomy  Hyperlipidemia  Rectal cancer  s/p chemotherapy and  radiation 12 weeks 2015 -3/2015  Obese  Type II diabetes mellitus  CHF (congestive heart failure)  DVT of leg (deep venous thrombosis)  right calf DVT  2019 pt on Eliquis  Renal insufficiency  S/P mastectomy, right    History of appendectomy    S/P ileostomy  low anterior resection-8/10/15, reversal of ileostomy   S/P colon resection  2015  S/P TKR (total knee replacement), right  2017          MEDICATIONS  (STANDING):  amLODIPine   Tablet 5 milliGRAM(s) Oral daily  apixaban 2.5 milliGRAM(s) Oral two times a day  aspirin  chewable 81 milliGRAM(s) Oral daily  atorvastatin 40 milliGRAM(s) Oral at bedtime  buMETAnide Injectable 1 milliGRAM(s) IV Push every 12 hours  dextrose 5%. 1000 milliLiter(s) (50 mL/Hr) IV Continuous <Continuous>  dextrose 5%. 1000 milliLiter(s) (100 mL/Hr) IV Continuous <Continuous>  dextrose 50% Injectable 25 Gram(s) IV Push once  dextrose 50% Injectable 12.5 Gram(s) IV Push once  dextrose 50% Injectable 25 Gram(s) IV Push once  famotidine    Tablet 20 milliGRAM(s) Oral daily  gabapentin 200 milliGRAM(s) Oral three times a day  glucagon  Injectable 1 milliGRAM(s) IntraMuscular once  insulin glargine Injectable (LANTUS) 15 Unit(s) SubCutaneous at bedtime  insulin lispro (ADMELOG) corrective regimen sliding scale   SubCutaneous three times a day before meals  insulin lispro (ADMELOG) corrective regimen sliding scale   SubCutaneous at bedtime  metoprolol succinate  milliGRAM(s) Oral daily  spironolactone 12.5 milliGRAM(s) Oral daily      Allergies    CT scan dye (Hives)  penicillin (Unknown)    Intolerances        SOCIAL HISTORY:  Denies ETOh,Smoking,     FAMILY HISTORY:  Family history of diabetes mellitus in mother  Family history of diabetes mellitus in son  Family history of heart attack (Child)        REVIEW OF SYSTEMS:  CONSTITUTIONAL: No weakness, fevers or chills  EYES/ENT: No visual changes;  No vertigo or throat pain   NECK: No pain or stiffness  RESPIRATORY: No cough, wheezing, hemoptysis; No shortness of breath  CARDIOVASCULAR: No chest pain or palpitations  GASTROINTESTINAL: No abdominal or epigastric pain. No nausea, vomiting, or hematemesis; No diarrhea or constipation. No melena or hematochezia.  GENITOURINARY: No dysuria, frequency or hematuria  NEUROLOGICAL: No numbness or weakness  SKIN: No itching, burning, rashes, or lesions   All other review of systems is negative unless indicated above.    VITAL:  T(C): , Max: 36.9 (22 @ 15:30)  T(F): , Max: 98.5 (22 @ 15:30)  HR: 62 (22 @ 13:38)  BP: 115/58 (22 @ 13:38)  BP(mean): 92 (22 @ 15:30)  RR: 18 (22 @ 09:20)  SpO2: 96% (22 @ 09:20)  Wt(kg): --    PHYSICAL EXAM:  Constitutional: NAD, Alert  HEENT: NCAT, MMM  Neck: Supple, No JVD  Respiratory: CTA-b/l  Cardiovascular: RRR s1s2, no m/r/g  Gastrointestinal: BS+, soft, NT/ND  Extremities: No peripheral edema b/l  Neurological: no focal deficits; strength grossly intact  Back: no CVAT b/l  Skin: No rashes, no nevi    LABS:                        14.9   10.34 )-----------( 284      ( 08 Sep 2022 12:44 )             49.3     Na(138)/K(4.3)/Cl(97)/HCO3(26)/BUN(27)/Cr(1.51)Glu(322)/Ca(10.0)/Mg(2.0)/PO4(3.2)     @ 12:44  Na(138)/K(5.0)/Cl(99)/HCO3(25)/BUN(24)/Cr(1.41)Glu(279)/Ca(9.8)/Mg(2.0)/PO4(--)     @ 12:17    Urinalysis Basic - ( 08 Sep 2022 13:33 )    Color: Yellow / Appearance: Slightly Turbid / S.022 / pH: x  Gluc: x / Ketone: Negative  / Bili: Negative / Urobili: Negative   Blood: x / Protein: 100 / Nitrite: Positive   Leuk Esterase: Large / RBC: 4 /hpf /  /HPF   Sq Epi: x / Non Sq Epi: 3 /hpf / Bacteria: Many      Creatinine, Random Urine: 163 mg/dL ( @ 13:49)  Protein/Creatinine Ratio Calculation: 0.6 Ratio ( @ 13:49)        IMAGING:    ASSESSMENT:  83 yo F wit a PMH of breast CA, DM, HTN, HLD, CHF, chronic RUE lymphedema p/w substernal/epigastric pain, admitted for hF  , nephrology consulted for CKD.    -CKD stage 3/4---- base line creatinine 1.5 mg/dl  -HTN   -CHF ---volume overload   -acid base----monitor bicarb while on diuretics      RECOMMENDATION:  1)Renal: cr at base line   avoid nephrotoxic medication  2)CVS: volume status improving cont with bumex 1 mg bid ,renal stable  keep k>4 , mag >2 , phas >3 while on diuretics.  cont meds with BP parameters  Electrolytes: no abnormality      Thank you for involving [facility] in this patient's care.    With warm regards    Brendakarine Rader  [facility]  Office: [phone]           Patient is a 82y old  Female who presents with a chief complaint of volume overload (08 Sep 2022 11:10)      HPI:  81 yo F wit a PMH of breast CA, DM, HTN, HLD, CHF, chronic RUE lymphedema p/w substernal/epigastric pain, SOB and worsening bilateral lower extremity swelling x 2 days.    pain is localized , non exertional , without palpitations , sob on minimal exertion  no fever /chills   no cough   no N/V/D   complaint with her meds including diuretics and eliquis      (07 Sep 2022 18:08)      PAST MEDICAL & SURGICAL HISTORY:  Breast CA, right   s/p mastectomy ,IV Chemotherapy  HTN (hypertension)  Lymphedema of arm  right arm s/p mastectomy  Hyperlipidemia  Rectal cancer  s/p chemotherapy and  radiation 12 weeks 2015 -3/2015  Obese  Type II diabetes mellitus  CHF (congestive heart failure)  DVT of leg (deep venous thrombosis)  right calf DVT  2019 pt on Eliquis  Renal insufficiency  S/P mastectomy, right    History of appendectomy    S/P ileostomy  low anterior resection-8/10/15, reversal of ileostomy   S/P colon resection  2015  S/P TKR (total knee replacement), right  2017          MEDICATIONS  (STANDING):  amLODIPine   Tablet 5 milliGRAM(s) Oral daily  apixaban 2.5 milliGRAM(s) Oral two times a day  aspirin  chewable 81 milliGRAM(s) Oral daily  atorvastatin 40 milliGRAM(s) Oral at bedtime  buMETAnide Injectable 1 milliGRAM(s) IV Push every 12 hours  dextrose 5%. 1000 milliLiter(s) (50 mL/Hr) IV Continuous <Continuous>  dextrose 5%. 1000 milliLiter(s) (100 mL/Hr) IV Continuous <Continuous>  dextrose 50% Injectable 25 Gram(s) IV Push once  dextrose 50% Injectable 12.5 Gram(s) IV Push once  dextrose 50% Injectable 25 Gram(s) IV Push once  famotidine    Tablet 20 milliGRAM(s) Oral daily  gabapentin 200 milliGRAM(s) Oral three times a day  glucagon  Injectable 1 milliGRAM(s) IntraMuscular once  insulin glargine Injectable (LANTUS) 15 Unit(s) SubCutaneous at bedtime  insulin lispro (ADMELOG) corrective regimen sliding scale   SubCutaneous three times a day before meals  insulin lispro (ADMELOG) corrective regimen sliding scale   SubCutaneous at bedtime  metoprolol succinate  milliGRAM(s) Oral daily  spironolactone 12.5 milliGRAM(s) Oral daily      Allergies    CT scan dye (Hives)  penicillin (Unknown)    Intolerances        SOCIAL HISTORY:  Denies ETOh,Smoking,     FAMILY HISTORY:  Family history of diabetes mellitus in mother  Family history of diabetes mellitus in son  Family history of heart attack (Child)        REVIEW OF SYSTEMS:  CONSTITUTIONAL: No weakness, fevers or chills  EYES/ENT: No visual changes;  No vertigo or throat pain   NECK: No pain or stiffness  RESPIRATORY: No cough, wheezing, hemoptysis; No shortness of breath  CARDIOVASCULAR: No chest pain or palpitations  GASTROINTESTINAL: No abdominal or epigastric pain. No nausea, vomiting, or hematemesis; No diarrhea or constipation. No melena or hematochezia.  GENITOURINARY: No dysuria, frequency or hematuria  NEUROLOGICAL: No numbness or weakness  SKIN: No itching, burning, rashes, or lesions   All other review of systems is negative unless indicated above.    VITAL:  T(C): , Max: 36.9 (22 @ 15:30)  T(F): , Max: 98.5 (22 @ 15:30)  HR: 62 (22 @ 13:38)  BP: 115/58 (22 @ 13:38)  BP(mean): 92 (22 @ 15:30)  RR: 18 (22 @ 09:20)  SpO2: 96% (22 @ 09:20)  Wt(kg): --    PHYSICAL EXAM:  Constitutional: NAD, Alert  HEENT: NCAT, MMM  Neck: Supple, No JVD  Respiratory: CTA-b/l  Cardiovascular: RRR s1s2, no m/r/g  Gastrointestinal: BS+, soft, NT/ND  Extremities: No peripheral edema b/l  Neurological: no focal deficits; strength grossly intact  Back: no CVAT b/l  Skin: No rashes, no nevi    LABS:                        14.9   10.34 )-----------( 284      ( 08 Sep 2022 12:44 )             49.3     Na(138)/K(4.3)/Cl(97)/HCO3(26)/BUN(27)/Cr(1.51)Glu(322)/Ca(10.0)/Mg(2.0)/PO4(3.2)     @ 12:44  Na(138)/K(5.0)/Cl(99)/HCO3(25)/BUN(24)/Cr(1.41)Glu(279)/Ca(9.8)/Mg(2.0)/PO4(--)     @ 12:17    Urinalysis Basic - ( 08 Sep 2022 13:33 )    Color: Yellow / Appearance: Slightly Turbid / S.022 / pH: x  Gluc: x / Ketone: Negative  / Bili: Negative / Urobili: Negative   Blood: x / Protein: 100 / Nitrite: Positive   Leuk Esterase: Large / RBC: 4 /hpf /  /HPF   Sq Epi: x / Non Sq Epi: 3 /hpf / Bacteria: Many      Creatinine, Random Urine: 163 mg/dL ( @ 13:49)  Protein/Creatinine Ratio Calculation: 0.6 Ratio ( @ 13:49)    seen and examined at  the bedside , in good spirit.      IMAGING:    ASSESSMENT:  81 yo F wit a PMH of breast CA, DM, HTN, HLD, CHF, chronic RUE lymphedema p/w substernal/epigastric pain, admitted for hF  , nephrology consulted for CKD.    -CKD stage 3/4---- base line creatinine 1.5 mg/dl  -HTN   -CHF ---volume overload   -acid base----monitor bicarb while on diuretics  -UTI---on abx    RECOMMENDATION:  1)Renal: cr at base line   UACR 0.6 mg.dl   renal sono in progress  ct ?hydro   get  PVR , if retaining need urology consult.  avoid nephrotoxic medication  2)CVS: volume status improving cont with bumex 1 mg bid ,renal stable  keep k>4 , mag >2 , phas >3 while on diuretics.  cont meds with BP parameters  Electrolytes: no abnormality      Thank you for involving [facility] in this patient's care.    With warm regards    Brenda Rader  [facility]  Office: [phone]

## 2022-09-09 DIAGNOSIS — I10 ESSENTIAL (PRIMARY) HYPERTENSION: ICD-10-CM

## 2022-09-09 DIAGNOSIS — E66.9 OBESITY, UNSPECIFIED: ICD-10-CM

## 2022-09-09 LAB
A1C WITH ESTIMATED AVERAGE GLUCOSE RESULT: 9.4 % — HIGH (ref 4–5.6)
ANION GAP SERPL CALC-SCNC: 17 MMOL/L — SIGNIFICANT CHANGE UP (ref 5–17)
BUN SERPL-MCNC: 32 MG/DL — HIGH (ref 7–23)
CALCIUM SERPL-MCNC: 9.7 MG/DL — SIGNIFICANT CHANGE UP (ref 8.4–10.5)
CHLORIDE SERPL-SCNC: 98 MMOL/L — SIGNIFICANT CHANGE UP (ref 96–108)
CO2 SERPL-SCNC: 24 MMOL/L — SIGNIFICANT CHANGE UP (ref 22–31)
CREAT SERPL-MCNC: 1.77 MG/DL — HIGH (ref 0.5–1.3)
EGFR: 28 ML/MIN/1.73M2 — LOW
ESTIMATED AVERAGE GLUCOSE: 223 MG/DL — HIGH (ref 68–114)
GLUCOSE BLDC GLUCOMTR-MCNC: 138 MG/DL — HIGH (ref 70–99)
GLUCOSE BLDC GLUCOMTR-MCNC: 176 MG/DL — HIGH (ref 70–99)
GLUCOSE BLDC GLUCOMTR-MCNC: 249 MG/DL — HIGH (ref 70–99)
GLUCOSE BLDC GLUCOMTR-MCNC: 272 MG/DL — HIGH (ref 70–99)
GLUCOSE BLDC GLUCOMTR-MCNC: 277 MG/DL — HIGH (ref 70–99)
GLUCOSE SERPL-MCNC: 168 MG/DL — HIGH (ref 70–99)
HCT VFR BLD CALC: 45.1 % — HIGH (ref 34.5–45)
HGB BLD-MCNC: 14 G/DL — SIGNIFICANT CHANGE UP (ref 11.5–15.5)
MCHC RBC-ENTMCNC: 29.1 PG — SIGNIFICANT CHANGE UP (ref 27–34)
MCHC RBC-ENTMCNC: 31 GM/DL — LOW (ref 32–36)
MCV RBC AUTO: 93.8 FL — SIGNIFICANT CHANGE UP (ref 80–100)
NRBC # BLD: 0 /100 WBCS — SIGNIFICANT CHANGE UP (ref 0–0)
PLATELET # BLD AUTO: 302 K/UL — SIGNIFICANT CHANGE UP (ref 150–400)
POTASSIUM SERPL-MCNC: 4.1 MMOL/L — SIGNIFICANT CHANGE UP (ref 3.5–5.3)
POTASSIUM SERPL-SCNC: 4.1 MMOL/L — SIGNIFICANT CHANGE UP (ref 3.5–5.3)
RBC # BLD: 4.81 M/UL — SIGNIFICANT CHANGE UP (ref 3.8–5.2)
RBC # FLD: 15.5 % — HIGH (ref 10.3–14.5)
SODIUM SERPL-SCNC: 139 MMOL/L — SIGNIFICANT CHANGE UP (ref 135–145)
WBC # BLD: 9.28 K/UL — SIGNIFICANT CHANGE UP (ref 3.8–10.5)
WBC # FLD AUTO: 9.28 K/UL — SIGNIFICANT CHANGE UP (ref 3.8–10.5)

## 2022-09-09 PROCEDURE — 99233 SBSQ HOSP IP/OBS HIGH 50: CPT

## 2022-09-09 PROCEDURE — 93306 TTE W/DOPPLER COMPLETE: CPT | Mod: 26

## 2022-09-09 RX ORDER — INSULIN GLARGINE 100 [IU]/ML
16 INJECTION, SOLUTION SUBCUTANEOUS AT BEDTIME
Refills: 0 | Status: DISCONTINUED | OUTPATIENT
Start: 2022-09-09 | End: 2022-09-13

## 2022-09-09 RX ORDER — INSULIN LISPRO 100/ML
7 VIAL (ML) SUBCUTANEOUS
Refills: 0 | Status: DISCONTINUED | OUTPATIENT
Start: 2022-09-09 | End: 2022-09-13

## 2022-09-09 RX ADMIN — GABAPENTIN 200 MILLIGRAM(S): 400 CAPSULE ORAL at 13:58

## 2022-09-09 RX ADMIN — GABAPENTIN 200 MILLIGRAM(S): 400 CAPSULE ORAL at 05:15

## 2022-09-09 RX ADMIN — Medication 1: at 08:24

## 2022-09-09 RX ADMIN — APIXABAN 2.5 MILLIGRAM(S): 2.5 TABLET, FILM COATED ORAL at 05:13

## 2022-09-09 RX ADMIN — FAMOTIDINE 20 MILLIGRAM(S): 10 INJECTION INTRAVENOUS at 11:23

## 2022-09-09 RX ADMIN — Medication 7 UNIT(S): at 17:03

## 2022-09-09 RX ADMIN — Medication 3: at 12:08

## 2022-09-09 RX ADMIN — BUMETANIDE 1 MILLIGRAM(S): 0.25 INJECTION INTRAMUSCULAR; INTRAVENOUS at 17:03

## 2022-09-09 RX ADMIN — ATORVASTATIN CALCIUM 40 MILLIGRAM(S): 80 TABLET, FILM COATED ORAL at 21:22

## 2022-09-09 RX ADMIN — APIXABAN 2.5 MILLIGRAM(S): 2.5 TABLET, FILM COATED ORAL at 17:03

## 2022-09-09 RX ADMIN — INSULIN GLARGINE 16 UNIT(S): 100 INJECTION, SOLUTION SUBCUTANEOUS at 21:23

## 2022-09-09 RX ADMIN — BUMETANIDE 1 MILLIGRAM(S): 0.25 INJECTION INTRAMUSCULAR; INTRAVENOUS at 05:13

## 2022-09-09 RX ADMIN — Medication 150 MILLIGRAM(S): at 05:13

## 2022-09-09 RX ADMIN — GABAPENTIN 200 MILLIGRAM(S): 400 CAPSULE ORAL at 21:23

## 2022-09-09 RX ADMIN — AMLODIPINE BESYLATE 5 MILLIGRAM(S): 2.5 TABLET ORAL at 05:15

## 2022-09-09 RX ADMIN — Medication 81 MILLIGRAM(S): at 11:23

## 2022-09-09 RX ADMIN — SPIRONOLACTONE 12.5 MILLIGRAM(S): 25 TABLET, FILM COATED ORAL at 05:14

## 2022-09-09 NOTE — CONSULT NOTE ADULT - PROBLEM SELECTOR RECOMMENDATION 9
Will increase Lantus to 16u at bedtime.  Will start Admelog 7u before each meal and continue Admelog correction scale coverage. Will continue monitoring FS and FU.  Must discontinue glipizide 2/2 CKD. Patient would benefit from insulin at home though unclear if she will be able/agreeable to self-injection.. Will continue monitoring and FU DC recommendations.  Discussed plan with daughter at bedside. Patient counseled for compliance with consistent low carb diet and exercise as tolerated outpatient.

## 2022-09-09 NOTE — PROGRESS NOTE ADULT - SUBJECTIVE AND OBJECTIVE BOX
[facility] Division of Hospital Medicine  Chidi Yeboah  Pager ([facility], 8A-5P): [phone]  Other Times:  [phone]    CC: 81 y/o F presenting with volume overload    SUBJECTIVE / OVERNIGHT EVENTS:  No acute events overnight  continues to urinate with IV bumex  Denies f/chills  Reports improvement in SOB    ADDITIONAL REVIEW OF SYSTEMS:    MEDICATIONS  (STANDING):  amLODIPine   Tablet 5 milliGRAM(s) Oral daily  apixaban 2.5 milliGRAM(s) Oral two times a day  aspirin  chewable 81 milliGRAM(s) Oral daily  atorvastatin 40 milliGRAM(s) Oral at bedtime  buMETAnide Injectable 1 milliGRAM(s) IV Push every 12 hours  dextrose 5%. 1000 milliLiter(s) (50 mL/Hr) IV Continuous <Continuous>  dextrose 5%. 1000 milliLiter(s) (100 mL/Hr) IV Continuous <Continuous>  dextrose 50% Injectable 25 Gram(s) IV Push once  dextrose 50% Injectable 12.5 Gram(s) IV Push once  dextrose 50% Injectable 25 Gram(s) IV Push once  famotidine    Tablet 20 milliGRAM(s) Oral daily  gabapentin 200 milliGRAM(s) Oral three times a day  glucagon  Injectable 1 milliGRAM(s) IntraMuscular once  insulin glargine Injectable (LANTUS) 16 Unit(s) SubCutaneous at bedtime  insulin lispro (ADMELOG) corrective regimen sliding scale   SubCutaneous three times a day before meals  insulin lispro (ADMELOG) corrective regimen sliding scale   SubCutaneous at bedtime  insulin lispro Injectable (ADMELOG) 7 Unit(s) SubCutaneous three times a day before meals  metoprolol succinate  milliGRAM(s) Oral daily  spironolactone 12.5 milliGRAM(s) Oral daily    MEDICATIONS  (PRN):  dextrose Oral Gel 15 Gram(s) Oral once PRN Blood Glucose LESS THAN 70 milliGRAM(s)/deciliter  melatonin 3 milliGRAM(s) Oral at bedtime PRN Insomnia      I&O's Summary    08 Sep 2022 07:01  -  09 Sep 2022 07:00  --------------------------------------------------------  IN: 730 mL / OUT: 600 mL / NET: 130 mL        PHYSICAL EXAM:  Vital Signs Last 24 Hrs  T(C): 36.4 (09 Sep 2022 09:36), Max: 36.7 (08 Sep 2022 17:51)  T(F): 97.5 (09 Sep 2022 09:36), Max: 98 (08 Sep 2022 17:51)  HR: 65 (09 Sep 2022 09:36) (64 - 66)  BP: 105/67 (09 Sep 2022 09:36) (105/67 - 144/70)  BP(mean): --  RR: 19 (09 Sep 2022 09:36) (18 - 19)  SpO2: 94% (09 Sep 2022 09:36) (93% - 96%)    Parameters below as of 09 Sep 2022 09:36  Patient On (Oxygen Delivery Method): room air      CONSTITUTIONAL: NAD, well-developed  EYES: PERRLA; conjunctiva and sclera clear  ENMT: Moist oral mucosa, no pharyngeal injection or exudates  NECK: Supple, no palpable masses  RESPIRATORY: Normal respiratory effort; lungs are clear to auscultation bilaterally  CARDIOVASCULAR: Regular rate and rhythm, normal S1 and S2, no murmur/rub/gallop; No lower extremity edema; Peripheral pulses are 2+ bilaterally  ABDOMEN: Nontender to palpation, normoactive bowel sounds  MUSCULOSKELETAL:  no clubbing or cyanosis of digits; no joint swelling or tenderness to palpation  PSYCH: A+O to person, place, and time; affect appropriate  NEUROLOGY: CN 2-12 are intact and symmetric; no gross sensory deficits   SKIN: No rashes; no palpable lesions    LABS:                        14.0   9.28  )-----------( 302      ( 09 Sep 2022 07:03 )             45.1         139  |  98  |  32<H>  ----------------------------<  168<H>  4.1   |  24  |  1.77<H>    Ca    9.7      09 Sep 2022 07:23  Phos  3.2       Mg     2.0     -08    TPro  8.4<H>  /  Alb  4.3  /  TBili  0.5  /  DBili  x   /  AST  14  /  ALT  9<L>  /  AlkPhos  99  09-08          Urinalysis Basic - ( 08 Sep 2022 13:33 )    Color: Yellow / Appearance: Slightly Turbid / S.022 / pH: x  Gluc: x / Ketone: Negative  / Bili: Negative / Urobili: Negative   Blood: x / Protein: 100 / Nitrite: Positive   Leuk Esterase: Large / RBC: 4 /hpf /  /HPF   Sq Epi: x / Non Sq Epi: 3 /hpf / Bacteria: Many

## 2022-09-09 NOTE — PROGRESS NOTE ADULT - PROBLEM SELECTOR PLAN 4
GFR 37%  -see's dr. Kan outpatient, consulted [facility]  -strict Is/Os  -avoid nephrotoxic agents  -renally dose all medications  -Cr increasing to 1.77, likely iso diuresis, ctm

## 2022-09-09 NOTE — PHYSICAL THERAPY INITIAL EVALUATION ADULT - PERTINENT HX OF CURRENT PROBLEM, REHAB EVAL
82yof w/ hx of HTN, hyperlipidemia, type 2 diabetes mellitus, breast cancer status post right partial mastectomy, rectal carcinoma status post resection, anemia, diastolic heart failure, hx of right lower extremity DVT, CAD s/p 2 vessel CABG 11/9/2020 and renal insufficiency who came with chest, pain, shortness of breath and LE edmea, was admitted for volume overload.

## 2022-09-09 NOTE — PROGRESS NOTE ADULT - PROBLEM SELECTOR PLAN 3
CT showing "Suggestion of chronic and severe right-sided hydronephrosis as on 11/3/2020 CT chest"  -May be a source of worsening ckd  -obtain renal US and if severe R hydronephrosis is present, would consult urology for stent placement CT showing "Suggestion of chronic and severe right-sided hydronephrosis as on 11/3/2020 CT chest"  -May be a source of worsening ckd  -Renal u/s with chronic severe R sided hydronephrosis, Uro to evaluate

## 2022-09-09 NOTE — PROGRESS NOTE ADULT - SUBJECTIVE AND OBJECTIVE BOX
Overnight events noted      VITAL:  T(C): , Max: 36.7 (22 @ 17:51)  T(F): , Max: 98 (22 @ 17:51)  HR: 65 (22 @ 09:36)  BP: 105/67 (22 @ 09:36)  BP(mean): --  RR: 19 (22 @ 09:36)  SpO2: 94% (22 @ 09:36)  Wt(kg): --      PHYSICAL EXAM:  General: Alerted, oriented  HEENT: MMM  Lungs:CTA-b/l  Heart: RRR S1S2  Abdomen: Soft, NTND  Ext: edema b/l with skin changes   : no chen      LABS:                        14.0   9.28  )-----------( 302      ( 09 Sep 2022 07:03 )             45.1     Na(139)/K(4.1)/Cl(98)/HCO3(24)/BUN(32)/Cr(1.77)Glu(168)/Ca(9.7)/Mg(--)/PO4(--)     @ 07:23  Na(138)/K(4.3)/Cl(97)/HCO3(26)/BUN(27)/Cr(1.51)Glu(322)/Ca(10.0)/Mg(2.0)/PO4(3.2)     @ 12:44  Na(138)/K(5.0)/Cl(99)/HCO3(25)/BUN(24)/Cr(1.41)Glu(279)/Ca(9.8)/Mg(2.0)/PO4(--)     @ 12:17    Urinalysis Basic - ( 08 Sep 2022 13:33 )    Color: Yellow / Appearance: Slightly Turbid / S.022 / pH: x  Gluc: x / Ketone: Negative  / Bili: Negative / Urobili: Negative   Blood: x / Protein: 100 / Nitrite: Positive   Leuk Esterase: Large / RBC: 4 /hpf /  /HPF   Sq Epi: x / Non Sq Epi: 3 /hpf / Bacteria: Many      Creatinine, Random Urine: 163 mg/dL ( @ 13:49)  Protein/Creatinine Ratio Calculation: 0.6 Ratio ( @ 13:49)    Images:  PROCEDURE DATE:  2022          INTERPRETATION:  CLINICAL INFORMATION: Right hydronephrosis.    COMPARISON: CT chest 2022 and 11/3/2020.    TECHNIQUE: Sonography of the kidneys and bladder.    FINDINGS:  Right kidney: 11.9 cm. Severe hydronephrosis with marked parenchymal   thinning. The right kidney was partially imaged on the chest CT of   11/3/2020, with severe hydronephrosis and parenchymal thinning noted at   that time.    Left kidney: 10.3 cm. No hydronephrosis. No renal mass or calculus   visualized.    Urinary bladder: Not visualized.    IMPRESSION:  Chronic severe right hydronephrosis with diffuse parenchymal thinning.    No left hydronephrosis.    Nonvisualization of the urinary bladder.        ASSESSMENT:  83 yo F wit a PMH of breast CA, DM, HTN, HLD, CHF, chronic RUE lymphedema p/w substernal/epigastric pain, admitted for HF  , nephrology consulted for CKD.    -CKD stage 3/4---- base line creatinine 1.5 mg/dl  -HTN   -CHF ---volume overload   -acid base----monitor bicarb while on diuretics  -UTI---on abx  -lymphedema    RECOMMENDATION:  1)Renal: cr slight high from base line likely in the  view of diuretic use  UPCR 0.6 mg.dl   chronic right renal hydronephrosis likely UPJ pathology ? urology eval  avoid nephrotoxic medication.  renal diet   2)CVS: volume status improving cont with bumex 1 mg iv  bid  monitor I/O  keep k>4 , mag >2 , phas >3 while on diuretics.  3)CVS :cont meds with BP parameters ,volume status improving   4)Electrolytes: no abnormality      discussed with daughter  Shauna present at bedside      Thank you for involving [facility] in this patient's care.    With warm regards    Brenda Rader  [facility]  Office: [phone]

## 2022-09-09 NOTE — CONSULT NOTE ADULT - ASSESSMENT
start diuresis for acute on chronic Diastolic CHF   -CKD stage 3/4---- base line creatinine 1.5 mg/dl  -HTN   -CHF ---volume overload   -acid base----monitor bicarb while on diuretics  -UTI---on abx 82F w/ PMH of CHF and admitted for SOB and worsening b/l LE swelling over the last 3 days found to have rising SCr with CT and US showing severe chronic R hydronephrosis. UA positive but without symptoms of urinary tract infection.    - Right hydronephrosis is chronic and has been seen previously on CT from 2020  - Rising SCr may be in context of diuresis and not related to hydronephrosis seen on imaging  - Recommend PVR to ensure adequate bladder emptying  - Follow up urine culture, but may not need treatment without symptoms of UTI  - Recommend nonurgent renal Lasix scan to evaluate function of R kidney and follow up outpatient with urology    [facility]  [address] [place], [place] [id number] [phone]    Case discussed with Dr. Delatorre

## 2022-09-09 NOTE — CONSULT NOTE ADULT - NS PANP COMMENT GEN_ALL_CORE FT
She was seen and examined, CT was reviewed. It appears that she has chronic hydronephrosis (possible UPJ obstruction) and with very thin parenchyma. No urologic surgical intervention needed at this time.

## 2022-09-09 NOTE — CONSULT NOTE ADULT - SUBJECTIVE AND OBJECTIVE BOX
HPI: 81 y/o F with PMHx of breast CA, DMT2, HTN, HLD, CHF, chronic RUE lymphedema admitted with substernal/epigastric pain, SOB and worsening bilateral lower extremity swelling x 3  days. Found to have elevated SCr 1.8 from baseline 1.4 which prompted an u/s which showed chronic severe R hydronephrosis. Pt also to have Nitrite/LE positive u/a. She reports following with Dr. Montenegro 2 years ago and has known about hydro for over 2 years. States "her right kidney doesn't work but her left one does." Chart review shows mild right hydro in , Dr. Montenegro ordered renal lasix scan but not completed. She denies fever, chills, dysuria, urgency, frequency or gross hematuria.     PAST MEDICAL & SURGICAL HISTORY:  Breast CA, right   s/p mastectomy ,IV Chemotherapy  HTN (hypertension)  Lymphedema of arm  right arm s/p mastectomy  Hyperlipidemia  Rectal cancer  s/p chemotherapy and  radiation 12 weeks 2015 -3/2015  Obese  Type II diabetes mellitus  CHF (congestive heart failure)  DVT of leg (deep venous thrombosis)  right calf DVT  2019 pt on Eliquis  Renal insufficiency    S/P mastectomy, right    History of appendectomy    S/P ileostomy  low anterior resection-8/10/15, reversal of ileostomy   S/P colon resection    S/P TKR (total knee replacement), right      FAMILY HISTORY:  Family history of diabetes mellitus in mother  Family history of diabetes mellitus in son  Family history of heart attack (Child)  No known  malignancy   SOCIAL HISTORY: Retired   Tobacco hx: nonsmoker     MEDICATIONS  (STANDING):  amLODIPine   Tablet 5 milliGRAM(s) Oral daily  apixaban 2.5 milliGRAM(s) Oral two times a day  aspirin  chewable 81 milliGRAM(s) Oral daily  atorvastatin 40 milliGRAM(s) Oral at bedtime  buMETAnide Injectable 1 milliGRAM(s) IV Push every 12 hours  dextrose 5%. 1000 milliLiter(s) (50 mL/Hr) IV Continuous <Continuous>  dextrose 5%. 1000 milliLiter(s) (100 mL/Hr) IV Continuous <Continuous>  dextrose 50% Injectable 25 Gram(s) IV Push once  dextrose 50% Injectable 12.5 Gram(s) IV Push once  dextrose 50% Injectable 25 Gram(s) IV Push once  famotidine    Tablet 20 milliGRAM(s) Oral daily  gabapentin 200 milliGRAM(s) Oral three times a day  glucagon  Injectable 1 milliGRAM(s) IntraMuscular once  insulin glargine Injectable (LANTUS) 16 Unit(s) SubCutaneous at bedtime  insulin lispro (ADMELOG) corrective regimen sliding scale   SubCutaneous three times a day before meals  insulin lispro (ADMELOG) corrective regimen sliding scale   SubCutaneous at bedtime  insulin lispro Injectable (ADMELOG) 7 Unit(s) SubCutaneous three times a day before meals  metoprolol succinate  milliGRAM(s) Oral daily  spironolactone 12.5 milliGRAM(s) Oral daily    MEDICATIONS  (PRN):  dextrose Oral Gel 15 Gram(s) Oral once PRN Blood Glucose LESS THAN 70 milliGRAM(s)/deciliter  melatonin 3 milliGRAM(s) Oral at bedtime PRN Insomnia    Allergies  CT scan dye (Hives)  penicillin (Unknown)  Intolerances    REVIEW OF SYSTEMS: Pertinent positives and negatives as stated in HPI, otherwise negative    Vital signs  T(C): 36.6 (22 @ 16:14), Max: 36.7 (22 @ 17:51)  HR: 68 (22 @ 16:14)  BP: 96/62 (22 @ 16:14)  SpO2: 95% (22 @ 16:14)    Physical Exam  Gen: NAD  HEENT: normocephalic, atraumatic, no scleral icterus  Pulm: CTA b/l, No respiratory distress, no subcostal retractions, no accessory muscle use   CV: S1 S2, RRR,  no JVD  Abd: Soft, NT, ND, no rebound tenderness or guarding  MSK: mild right CVAT, Moving all extremities, full ROM in all extremities, No edema present  NEURO: A&Ox3, no focal neurological deficits, CN 2-12 grossly intact  SKIN: warm, dry, no rash.    LABS:     @ 07:23    WBC --    / Hct --    / SCr 1.77      @ 07:03    WBC 9.28  / Hct 45.1  / SCr --           139  |  98  |  32<H>  ----------------------------<  168<H>  4.1   |  24  |  1.77<H>    Ca    9.7      09 Sep 2022 07:23  Phos  3.2     -  Mg     2.0     -    TPro  8.4<H>  /  Alb  4.3  /  TBili  0.5  /  DBili  x   /  AST  14  /  ALT  9<L>  /  AlkPhos  99  -    Urinalysis Basic - ( 08 Sep 2022 13:33 )    Color: Yellow / Appearance: Slightly Turbid / S.022 / pH: x  Gluc: x / Ketone: Negative  / Bili: Negative / Urobili: Negative   Blood: x / Protein: 100 / Nitrite: Positive   Leuk Esterase: Large / RBC: 4 /hpf /  /HPF   Sq Epi: x / Non Sq Epi: 3 /hpf / Bacteria: Many    Urine Cx: pending     Radiology:  < from: CT Chest No Cont (22 @ 19:39) >  IMPRESSION:.    Clear lungs. No evidence of pulmonary edema.    Suggestion of chronic and severe right-sided hydronephrosis as on   11/3/2020 CT chest. Recommend correlation with renal ultrasound to   further assess.    --- End of Report ---    < end of copied text >  < from: US Kidney and Bladder (22 @ 16:04) >    IMPRESSION:  Chronic severe right hydronephrosis with diffuse parenchymal thinning.    No left hydronephrosis.    Nonvisualization of the urinary bladder.      < end of copied text >   HPI: 83 y/o F with PMHx of breast CA, DMT2, HTN, HLD, CHF, chronic RUE lymphedema admitted with substernal/epigastric pain, SOB and worsening bilateral lower extremity swelling x 3  days. Found to have elevated SCr 1.8 from baseline 1.4 which prompted an u/s which showed chronic severe R hydronephrosis. Pt also to have Nitrite/LE positive u/a. She reports following with Dr. Montenegro 2 years ago and has known about hydro for over 2 years. States "her right kidney doesn't work but her left one does." Chart review shows mild right hydro in , Dr. Montenegro ordered renal lasix scan but not completed. She denies fever, chills, dysuria, urgency, frequency or gross hematuria.     PAST MEDICAL & SURGICAL HISTORY:  Breast CA, right   s/p mastectomy ,IV Chemotherapy  HTN (hypertension)  Lymphedema of arm  right arm s/p mastectomy  Hyperlipidemia  Rectal cancer  s/p chemotherapy and  radiation 12 weeks 2015 -3/2015  Obese  Type II diabetes mellitus  CHF (congestive heart failure)  DVT of leg (deep venous thrombosis)  right calf DVT  2019 pt on Eliquis  Renal insufficiency    S/P mastectomy, right    History of appendectomy    S/P ileostomy  low anterior resection-8/10/15, reversal of ileostomy   S/P colon resection    S/P TKR (total knee replacement), right      FAMILY HISTORY:  Family history of diabetes mellitus in mother  Family history of diabetes mellitus in son  Family history of heart attack (Child)  No known  malignancy   SOCIAL HISTORY: Retired   Tobacco hx: nonsmoker     MEDICATIONS  (STANDING):  amLODIPine   Tablet 5 milliGRAM(s) Oral daily  apixaban 2.5 milliGRAM(s) Oral two times a day  aspirin  chewable 81 milliGRAM(s) Oral daily  atorvastatin 40 milliGRAM(s) Oral at bedtime  buMETAnide Injectable 1 milliGRAM(s) IV Push every 12 hours  dextrose 5%. 1000 milliLiter(s) (50 mL/Hr) IV Continuous <Continuous>  dextrose 5%. 1000 milliLiter(s) (100 mL/Hr) IV Continuous <Continuous>  dextrose 50% Injectable 25 Gram(s) IV Push once  dextrose 50% Injectable 12.5 Gram(s) IV Push once  dextrose 50% Injectable 25 Gram(s) IV Push once  famotidine    Tablet 20 milliGRAM(s) Oral daily  gabapentin 200 milliGRAM(s) Oral three times a day  glucagon  Injectable 1 milliGRAM(s) IntraMuscular once  insulin glargine Injectable (LANTUS) 16 Unit(s) SubCutaneous at bedtime  insulin lispro (ADMELOG) corrective regimen sliding scale   SubCutaneous three times a day before meals  insulin lispro (ADMELOG) corrective regimen sliding scale   SubCutaneous at bedtime  insulin lispro Injectable (ADMELOG) 7 Unit(s) SubCutaneous three times a day before meals  metoprolol succinate  milliGRAM(s) Oral daily  spironolactone 12.5 milliGRAM(s) Oral daily    MEDICATIONS  (PRN):  dextrose Oral Gel 15 Gram(s) Oral once PRN Blood Glucose LESS THAN 70 milliGRAM(s)/deciliter  melatonin 3 milliGRAM(s) Oral at bedtime PRN Insomnia    Allergies  CT scan dye (Hives)  penicillin (Unknown)  Intolerances    REVIEW OF SYSTEMS: Pertinent positives and negatives as stated in HPI, otherwise negative    Vital signs  T(C): 36.6 (22 @ 16:14), Max: 36.7 (22 @ 17:51)  HR: 68 (22 @ 16:14)  BP: 96/62 (22 @ 16:14)  SpO2: 95% (22 @ 16:14)    Physical Exam  Gen: NAD  HEENT: normocephalic, atraumatic, no scleral icterus  Pulm: CTA b/l, No respiratory distress, no subcostal retractions, no accessory muscle use   CV: S1 S2, RRR,  no JVD  Abd: Soft, NT, ND, no rebound tenderness or guarding  MSK: mild right CVAT, Moving all extremities, full ROM in all extremities, No edema present  NEURO: A&Ox3, no focal neurological deficits, CN 2-12 grossly intact  SKIN: warm, dry, no rash.    LABS:     @ 07:23    WBC --    / Hct --    / SCr 1.77      @ 07:03    WBC 9.28  / Hct 45.1  / SCr --           139  |  98  |  32<H>  ----------------------------<  168<H>  4.1   |  24  |  1.77<H>    Ca    9.7      09 Sep 2022 07:23  Phos  3.2     -  Mg     2.0     -    TPro  8.4<H>  /  Alb  4.3  /  TBili  0.5  /  DBili  x   /  AST  14  /  ALT  9<L>  /  AlkPhos  99  -    Urinalysis Basic - ( 08 Sep 2022 13:33 )    Color: Yellow / Appearance: Slightly Turbid / S.022 / pH: x  Gluc: x / Ketone: Negative  / Bili: Negative / Urobili: Negative   Blood: x / Protein: 100 / Nitrite: Positive   Leuk Esterase: Large / RBC: 4 /hpf /  /HPF   Sq Epi: x / Non Sq Epi: 3 /hpf / Bacteria: Many    Urine Cx: pending     Radiology:  < from: CT Chest No Cont (22 @ 19:39) >  IMPRESSION:.    Clear lungs. No evidence of pulmonary edema.    Suggestion of chronic and severe right-sided hydronephrosis as on   11/3/2020 CT chest. Recommend correlation with renal ultrasound to   further assess.    --- End of Report ---    < end of copied text >  < from: US Kidney and Bladder (22 @ 16:04) >    IMPRESSION:  Chronic severe right hydronephrosis with diffuse parenchymal thinning.    No left hydronephrosis.    Nonvisualization of the urinary bladder.      < end of copied text >

## 2022-09-09 NOTE — CONSULT NOTE ADULT - SUBJECTIVE AND OBJECTIVE BOX
HPI:  83 yo F wit a PMH of breast CA, DM, HTN, HLD, CHF, chronic RUE lymphedema p/w substernal/epigastric pain, SOB and worsening bilateral lower extremity swelling x 2 days.    pain is localized , non exertional , without palpitations , sob on minimal exertion  no fever /chills   no cough   no N/V/D   complaint with her meds including diuretics and eliquis      (07 Sep 2022 18:08)    Daughter at bedside helped with endocrine history.  Patient has history of diabetes, A1C 9.4%, on oral meds at home (Glimepiride 2mg AM, Januvia) (reports her doctor prescribed Ozempic weekly injection but patient will not do injections..), no recent hypoglycemic episodes, no polyuria polydipsia. Patient follows up with PCP for diabetes management.  Endo was consulted for glycemic control.    PAST MEDICAL & SURGICAL HISTORY:  Breast CA, right  1989 s/p mastectomy ,IV Chemotherapy      HTN (hypertension)      Lymphedema of arm  right arm s/p mastectomy      Hyperlipidemia      Rectal cancer  s/p chemotherapy and  radiation 12 weeks 2/2015 -3/2015      Obese      Type II diabetes mellitus      CHF (congestive heart failure)      DVT of leg (deep venous thrombosis)  right calf DVT  2/2019 pt on Eliquis      Renal insufficiency      S/P mastectomy, right  1989      History of appendectomy  1953      S/P ileostomy  low anterior resection-8/10/15, reversal of ileostomy 2016      S/P colon resection  2015      S/P TKR (total knee replacement), right  2017          FAMILY HISTORY:  Family history of diabetes mellitus in mother    Family history of diabetes mellitus in son    Family history of heart attack (Child)        Social History:    Outpatient Medications:    MEDICATIONS  (STANDING):  amLODIPine   Tablet 5 milliGRAM(s) Oral daily  apixaban 2.5 milliGRAM(s) Oral two times a day  aspirin  chewable 81 milliGRAM(s) Oral daily  atorvastatin 40 milliGRAM(s) Oral at bedtime  buMETAnide Injectable 1 milliGRAM(s) IV Push every 12 hours  dextrose 5%. 1000 milliLiter(s) (50 mL/Hr) IV Continuous <Continuous>  dextrose 5%. 1000 milliLiter(s) (100 mL/Hr) IV Continuous <Continuous>  dextrose 50% Injectable 25 Gram(s) IV Push once  dextrose 50% Injectable 12.5 Gram(s) IV Push once  dextrose 50% Injectable 25 Gram(s) IV Push once  famotidine    Tablet 20 milliGRAM(s) Oral daily  gabapentin 200 milliGRAM(s) Oral three times a day  glucagon  Injectable 1 milliGRAM(s) IntraMuscular once  insulin glargine Injectable (LANTUS) 16 Unit(s) SubCutaneous at bedtime  insulin lispro (ADMELOG) corrective regimen sliding scale   SubCutaneous three times a day before meals  insulin lispro (ADMELOG) corrective regimen sliding scale   SubCutaneous at bedtime  insulin lispro Injectable (ADMELOG) 7 Unit(s) SubCutaneous three times a day before meals  metoprolol succinate  milliGRAM(s) Oral daily  spironolactone 12.5 milliGRAM(s) Oral daily    MEDICATIONS  (PRN):  dextrose Oral Gel 15 Gram(s) Oral once PRN Blood Glucose LESS THAN 70 milliGRAM(s)/deciliter  melatonin 3 milliGRAM(s) Oral at bedtime PRN Insomnia      Allergies    CT scan dye (Hives)  penicillin (Unknown)    Intolerances      Review of Systems:  Constitutional: No fever, no chills  Eyes: No blurry vision  Neuro: No tremors  HEENT: No pain, no neck swelling  Cardiovascular: No chest pain, no palpitations  Respiratory: Has SOB, no cough  GI: No nausea, vomiting, abdominal pain  : No dysuria  Skin: no rash  MSK: Has leg swelling.  Psych: no depression  Endocrine: no polyuria, polydipsia    ALL OTHER SYSTEMS REVIEWED AND NEGATIVE    UNABLE TO OBTAIN    PHYSICAL EXAM:  VITALS: T(C): 36.4 (09-09-22 @ 09:36)  T(F): 97.5 (09-09-22 @ 09:36), Max: 98 (09-08-22 @ 17:51)  HR: 65 (09-09-22 @ 09:36) (64 - 66)  BP: 105/67 (09-09-22 @ 09:36) (105/67 - 140/74)  RR:  (18 - 19)  SpO2:  (93% - 96%)  Wt(kg): --  GENERAL: NAD, well-groomed, well-developed  EYES: No proptosis, no lid lag  HEENT:  Atraumatic, Normocephalic  THYROID: Normal size, no palpable nodules  RESPIRATORY: Clear to auscultation bilaterally; No rales, rhonchi, wheezing  CARDIOVASCULAR: Si S2, No murmurs;  GI: Soft, non distended, normal bowel sounds  SKIN: Dry, intact, No rashes or lesions  MUSCULOSKELETAL: Has BL lower extremity edema.  NEURO:  no tremor, sensation decreased in feet BL,    POCT Blood Glucose.: 277 mg/dL (09-09-22 @ 12:04)  POCT Blood Glucose.: 176 mg/dL (09-09-22 @ 08:11)  POCT Blood Glucose.: 268 mg/dL (09-08-22 @ 21:20)  POCT Blood Glucose.: 195 mg/dL (09-08-22 @ 16:55)  POCT Blood Glucose.: 341 mg/dL (09-08-22 @ 11:48)                            14.0   9.28  )-----------( 302      ( 09 Sep 2022 07:03 )             45.1       09-09    139  |  98  |  32<H>  ----------------------------<  168<H>  4.1   |  24  |  1.77<H>    eGFR: 28<L>    Ca    9.7      09-09  Mg     2.0     09-08  Phos  3.2     09-08    TPro  8.4<H>  /  Alb  4.3  /  TBili  0.5  /  DBili  x   /  AST  14  /  ALT  9<L>  /  AlkPhos  99  09-08      Thyroid Function Tests:              Radiology:                    HPI:  83 yo F wit a PMH of breast CA, DM, HTN, HLD, CHF, chronic RUE lymphedema p/w substernal/epigastric pain, SOB and worsening bilateral lower extremity swelling x 2 days.    pain is localized , non exertional , without palpitations , sob on minimal exertion  no fever /chills   no cough   no N/V/D   complaint with her meds including diuretics and eliquis      (07 Sep 2022 18:08)    Daughter at bedside helped with endocrine history.  Patient has history of diabetes, A1C 9.4%, on oral meds at home (Glimepiride 2mg AM, Januvia) (reports her doctor prescribed Ozempic weekly injection but patient will not do injections..), no recent hypoglycemic episodes, no polyuria polydipsia. Patient follows up with PCP for diabetes management.  Endo was consulted for glycemic control.    PAST MEDICAL & SURGICAL HISTORY:  Breast CA, right  1989 s/p mastectomy ,IV Chemotherapy      HTN (hypertension)      Lymphedema of arm  right arm s/p mastectomy      Hyperlipidemia      Rectal cancer  s/p chemotherapy and  radiation 12 weeks 2/2015 -3/2015        Obese      Type II diabetes mellitus      CHF (congestive heart failure)      DVT of leg (deep venous thrombosis)  right calf DVT  2/2019 pt on Eliquis      Renal insufficiency      S/P mastectomy, right  1989      History of appendectomy  1953      S/P ileostomy  low anterior resection-8/10/15, reversal of ileostomy 2016      S/P colon resection  2015      S/P TKR (total knee replacement), right  2017          FAMILY HISTORY:  Family history of diabetes mellitus in mother    Family history of diabetes mellitus in son    Family history of heart attack (Child)        Social History:    Outpatient Medications:    MEDICATIONS  (STANDING):  amLODIPine   Tablet 5 milliGRAM(s) Oral daily  apixaban 2.5 milliGRAM(s) Oral two times a day  aspirin  chewable 81 milliGRAM(s) Oral daily  atorvastatin 40 milliGRAM(s) Oral at bedtime  buMETAnide Injectable 1 milliGRAM(s) IV Push every 12 hours  dextrose 5%. 1000 milliLiter(s) (50 mL/Hr) IV Continuous <Continuous>  dextrose 5%. 1000 milliLiter(s) (100 mL/Hr) IV Continuous <Continuous>  dextrose 50% Injectable 25 Gram(s) IV Push once  dextrose 50% Injectable 12.5 Gram(s) IV Push once  dextrose 50% Injectable 25 Gram(s) IV Push once  famotidine    Tablet 20 milliGRAM(s) Oral daily  gabapentin 200 milliGRAM(s) Oral three times a day  glucagon  Injectable 1 milliGRAM(s) IntraMuscular once  insulin glargine Injectable (LANTUS) 16 Unit(s) SubCutaneous at bedtime  insulin lispro (ADMELOG) corrective regimen sliding scale   SubCutaneous three times a day before meals  insulin lispro (ADMELOG) corrective regimen sliding scale   SubCutaneous at bedtime  insulin lispro Injectable (ADMELOG) 7 Unit(s) SubCutaneous three times a day before meals  metoprolol succinate  milliGRAM(s) Oral daily  spironolactone 12.5 milliGRAM(s) Oral daily    MEDICATIONS  (PRN):  dextrose Oral Gel 15 Gram(s) Oral once PRN Blood Glucose LESS THAN 70 milliGRAM(s)/deciliter  melatonin 3 milliGRAM(s) Oral at bedtime PRN Insomnia      Allergies    CT scan dye (Hives)  penicillin (Unknown)    Intolerances      Review of Systems:  Constitutional: No fever, no chills  Eyes: No blurry vision  Neuro: No tremors  HEENT: No pain, no neck swelling  Cardiovascular: No chest pain, no palpitations  Respiratory: Has SOB, no cough  GI: No nausea, vomiting, abdominal pain  : No dysuria  Skin: no rash  MSK: Has leg swelling.  Psych: no depression  Endocrine: no polyuria, polydipsia    ALL OTHER SYSTEMS REVIEWED AND NEGATIVE    UNABLE TO OBTAIN    PHYSICAL EXAM:  VITALS: T(C): 36.4 (09-09-22 @ 09:36)  T(F): 97.5 (09-09-22 @ 09:36), Max: 98 (09-08-22 @ 17:51)  HR: 65 (09-09-22 @ 09:36) (64 - 66)  BP: 105/67 (09-09-22 @ 09:36) (105/67 - 140/74)  RR:  (18 - 19)  SpO2:  (93% - 96%)  Wt(kg): --  GENERAL: NAD, well-groomed, well-developed  EYES: No proptosis, no lid lag  HEENT:  Atraumatic, Normocephalic  THYROID: Normal size, no palpable nodules  RESPIRATORY: Clear to auscultation bilaterally; No rales, rhonchi, wheezing  CARDIOVASCULAR: Si S2, No murmurs;  GI: Soft, non distended, normal bowel sounds  SKIN: Dry, intact, No rashes or lesions  MUSCULOSKELETAL: Has BL lower extremity edema.  NEURO:  no tremor, sensation decreased in feet BL,    POCT Blood Glucose.: 277 mg/dL (09-09-22 @ 12:04)  POCT Blood Glucose.: 176 mg/dL (09-09-22 @ 08:11)  POCT Blood Glucose.: 268 mg/dL (09-08-22 @ 21:20)  POCT Blood Glucose.: 195 mg/dL (09-08-22 @ 16:55)  POCT Blood Glucose.: 341 mg/dL (09-08-22 @ 11:48)                            14.0   9.28  )-----------( 302      ( 09 Sep 2022 07:03 )             45.1       09-09    139  |  98  |  32<H>  ----------------------------<  168<H>  4.1   |  24  |  1.77<H>    eGFR: 28<L>    Ca    9.7      09-09  Mg     2.0     09-08  Phos  3.2     09-08    TPro  8.4<H>  /  Alb  4.3  /  TBili  0.5  /  DBili  x   /  AST  14  /  ALT  9<L>  /  AlkPhos  99  09-08      Thyroid Function Tests:              Radiology:

## 2022-09-09 NOTE — PROGRESS NOTE ADULT - PROBLEM SELECTOR PLAN 2
A1c on Aug 23, 2022 (outpatient records) was 9.6  -given uncontrolled diabetes, will likely need to be started on insulin as an outpatient  -gabapentin for neuropathy (pt takes ER, but here will do 200 TID)  -endocrinology consulted given worsening A1c, neuropathy and likely needs to be newly started on insulin as an outpatient  -increased to lantus 16, lispro 7 TID  -f/u urine protein/cr and possibly start ace/arb A1c on Aug 23, 2022 (outpatient records) was 9.6  -given uncontrolled diabetes, will likely need to be started on insulin as an outpatient  -gabapentin for neuropathy (pt takes ER, but here will do 200 TID)  -endocrinology consulted given worsening A1c, neuropathy and likely needs to be newly started on insulin as an outpatient  -increased to lantus 16, lispro 7 TID

## 2022-09-10 LAB
ANION GAP SERPL CALC-SCNC: 13 MMOL/L — SIGNIFICANT CHANGE UP (ref 5–17)
BUN SERPL-MCNC: 43 MG/DL — HIGH (ref 7–23)
CALCIUM SERPL-MCNC: 9.3 MG/DL — SIGNIFICANT CHANGE UP (ref 8.4–10.5)
CHLORIDE SERPL-SCNC: 98 MMOL/L — SIGNIFICANT CHANGE UP (ref 96–108)
CO2 SERPL-SCNC: 26 MMOL/L — SIGNIFICANT CHANGE UP (ref 22–31)
CREAT SERPL-MCNC: 1.83 MG/DL — HIGH (ref 0.5–1.3)
EGFR: 27 ML/MIN/1.73M2 — LOW
GLUCOSE BLDC GLUCOMTR-MCNC: 130 MG/DL — HIGH (ref 70–99)
GLUCOSE BLDC GLUCOMTR-MCNC: 142 MG/DL — HIGH (ref 70–99)
GLUCOSE BLDC GLUCOMTR-MCNC: 150 MG/DL — HIGH (ref 70–99)
GLUCOSE BLDC GLUCOMTR-MCNC: 192 MG/DL — HIGH (ref 70–99)
GLUCOSE SERPL-MCNC: 161 MG/DL — HIGH (ref 70–99)
POTASSIUM SERPL-MCNC: 5.1 MMOL/L — SIGNIFICANT CHANGE UP (ref 3.5–5.3)
POTASSIUM SERPL-SCNC: 5.1 MMOL/L — SIGNIFICANT CHANGE UP (ref 3.5–5.3)
SODIUM SERPL-SCNC: 137 MMOL/L — SIGNIFICANT CHANGE UP (ref 135–145)
T4 FREE SERPL-MCNC: 1.4 NG/DL — SIGNIFICANT CHANGE UP (ref 0.9–1.8)
TSH SERPL-MCNC: 1.1 UIU/ML — SIGNIFICANT CHANGE UP (ref 0.27–4.2)

## 2022-09-10 PROCEDURE — 99221 1ST HOSP IP/OBS SF/LOW 40: CPT | Mod: FS

## 2022-09-10 PROCEDURE — 99233 SBSQ HOSP IP/OBS HIGH 50: CPT

## 2022-09-10 RX ORDER — CEFTRIAXONE 500 MG/1
1000 INJECTION, POWDER, FOR SOLUTION INTRAMUSCULAR; INTRAVENOUS EVERY 24 HOURS
Refills: 0 | Status: DISCONTINUED | OUTPATIENT
Start: 2022-09-10 | End: 2022-09-12

## 2022-09-10 RX ADMIN — Medication 81 MILLIGRAM(S): at 12:07

## 2022-09-10 RX ADMIN — APIXABAN 2.5 MILLIGRAM(S): 2.5 TABLET, FILM COATED ORAL at 17:02

## 2022-09-10 RX ADMIN — Medication 150 MILLIGRAM(S): at 05:50

## 2022-09-10 RX ADMIN — INSULIN GLARGINE 16 UNIT(S): 100 INJECTION, SOLUTION SUBCUTANEOUS at 22:11

## 2022-09-10 RX ADMIN — APIXABAN 2.5 MILLIGRAM(S): 2.5 TABLET, FILM COATED ORAL at 05:50

## 2022-09-10 RX ADMIN — Medication 7 UNIT(S): at 12:08

## 2022-09-10 RX ADMIN — BUMETANIDE 1 MILLIGRAM(S): 0.25 INJECTION INTRAMUSCULAR; INTRAVENOUS at 17:02

## 2022-09-10 RX ADMIN — GABAPENTIN 200 MILLIGRAM(S): 400 CAPSULE ORAL at 22:11

## 2022-09-10 RX ADMIN — Medication 7 UNIT(S): at 08:25

## 2022-09-10 RX ADMIN — ATORVASTATIN CALCIUM 40 MILLIGRAM(S): 80 TABLET, FILM COATED ORAL at 22:11

## 2022-09-10 RX ADMIN — CEFTRIAXONE 100 MILLIGRAM(S): 500 INJECTION, POWDER, FOR SOLUTION INTRAMUSCULAR; INTRAVENOUS at 17:02

## 2022-09-10 RX ADMIN — GABAPENTIN 200 MILLIGRAM(S): 400 CAPSULE ORAL at 05:50

## 2022-09-10 RX ADMIN — FAMOTIDINE 20 MILLIGRAM(S): 10 INJECTION INTRAVENOUS at 12:07

## 2022-09-10 RX ADMIN — Medication 7 UNIT(S): at 17:03

## 2022-09-10 RX ADMIN — GABAPENTIN 200 MILLIGRAM(S): 400 CAPSULE ORAL at 13:27

## 2022-09-10 NOTE — DIETITIAN INITIAL EVALUATION ADULT - ADD RECOMMEND
1) Continue Consistent carbohydrate, DASH/TLC diet   2) Reinforce DM and CHF diet education PRN   3) Monitor PO intake, diet tolerance, weight trends, labs, GI function, and skin integrity

## 2022-09-10 NOTE — PROGRESS NOTE ADULT - PROBLEM SELECTOR PLAN 3
CT showing "Suggestion of chronic and severe right-sided hydronephrosis as on 11/3/2020 CT chest"  -May be a source of worsening CKD  -Renal u/s with chronic severe R sided hydronephrosis  -Urology team consulted for evaluation - rec nonurgent renal Lasix scan to evaluate function of R kidney and follow up outpatient with urology  -Monitor bladder scans for PVR      #UTI: patient complaining of urinary symptoms and UA + with urine culture growing e coli >100k  -Start Ceftriaxone x3days  -F/U on urine cx sensitivities

## 2022-09-10 NOTE — DIETITIAN INITIAL EVALUATION ADULT - NS FNS DIET ORDER
Diet, Regular:   Consistent Carbohydrate {No Snacks} (CSTCHO)  DASH/TLC {Sodium & Cholesterol Restricted} (DASH) (09-07-22 @ 18:07) [Active]

## 2022-09-10 NOTE — DIETITIAN INITIAL EVALUATION ADULT - ORAL INTAKE PTA/DIET HISTORY
PTA per pt  -Intake: poor PO intake x1 week in setting of SOB while eating; baseline consists of x3 meals prepared by Daughter; endorses monitoring sodium and carbohydrate intake   -Chewing/Swallowing: denies difficulty   -Allergies/Intolerances: none   -Vitamins/Supplements: Vitamin D3

## 2022-09-10 NOTE — PROGRESS NOTE ADULT - PROBLEM SELECTOR PLAN 2
A1c on Aug 23, 2022 (outpatient records) was 9.6    -given uncontrolled diabetes, will likely need to be started on insulin as an outpatient  -Continue gabapentin for neuropathy (pt takes ER, but while inpatient continue 200 TID)  -Endocrinology team consulted given worsening A1c, neuropathy and likely needs to be newly started on insulin as an outpatient  -Continue Insulin Lantus 16u HS, Lispro 7u TIDwm  -Monitor fingerstick glu checks, goal 100-180  -ISS  -Nutritionist annette

## 2022-09-10 NOTE — PROGRESS NOTE ADULT - PROBLEM SELECTOR PLAN 5
Overweight/Obesity: Patient counseled for weight loss, physical activity as tolerated, low calorie diet.

## 2022-09-10 NOTE — DIETITIAN INITIAL EVALUATION ADULT - REASON INDICATOR FOR ASSESSMENT
Consult: nutrition assessment/education   Sources: EMR, RN, Patient (preferred language - Belizean)   -: Gudelia ([phone])

## 2022-09-10 NOTE — PROGRESS NOTE ADULT - ASSESSMENT
82F w/ PMH of CHF and admitted for SOB and worsening b/l LE swelling over the last 3 days found to have rising SCr with CT and US showing severe chronic R hydronephrosis. UA positive but without symptoms of urinary tract infection.    - Right hydronephrosis is chronic and has been seen previously on CT from 2020  - Trend CR  - PVR 9/10: 10cc, monitor voids  - Urine culture >100,000 ecoli  - follow up nonurgent renal Lasix scan to evaluate function of R kidney and follow up outpatient with urology    [facility]  [address] [place], [place] [id number] [phone]

## 2022-09-10 NOTE — PROGRESS NOTE ADULT - PROBLEM SELECTOR PLAN 4
GFR 37%  -patient follows [facility] outpatient, consulted [facility]    -sCr worsening in the setting of diuretics   -Diuretics per nephrology recs  -strict Is/Os. Check daily BMP  -avoid nephrotoxic agents  -renally dose all medications

## 2022-09-10 NOTE — DIETITIAN INITIAL EVALUATION ADULT - PERTINENT MEDS FT
MEDICATIONS  (STANDING):  amLODIPine   Tablet 5 milliGRAM(s) Oral daily  apixaban 2.5 milliGRAM(s) Oral two times a day  aspirin  chewable 81 milliGRAM(s) Oral daily  atorvastatin 40 milliGRAM(s) Oral at bedtime  buMETAnide Injectable 1 milliGRAM(s) IV Push every 12 hours  dextrose 5%. 1000 milliLiter(s) (50 mL/Hr) IV Continuous <Continuous>  dextrose 5%. 1000 milliLiter(s) (100 mL/Hr) IV Continuous <Continuous>  dextrose 50% Injectable 25 Gram(s) IV Push once  dextrose 50% Injectable 12.5 Gram(s) IV Push once  dextrose 50% Injectable 25 Gram(s) IV Push once  famotidine    Tablet 20 milliGRAM(s) Oral daily  gabapentin 200 milliGRAM(s) Oral three times a day  glucagon  Injectable 1 milliGRAM(s) IntraMuscular once  insulin glargine Injectable (LANTUS) 16 Unit(s) SubCutaneous at bedtime  insulin lispro (ADMELOG) corrective regimen sliding scale   SubCutaneous three times a day before meals  insulin lispro (ADMELOG) corrective regimen sliding scale   SubCutaneous at bedtime  insulin lispro Injectable (ADMELOG) 7 Unit(s) SubCutaneous three times a day before meals  metoprolol succinate  milliGRAM(s) Oral daily  spironolactone 12.5 milliGRAM(s) Oral daily    MEDICATIONS  (PRN):  dextrose Oral Gel 15 Gram(s) Oral once PRN Blood Glucose LESS THAN 70 milliGRAM(s)/deciliter  melatonin 3 milliGRAM(s) Oral at bedtime PRN Insomnia

## 2022-09-10 NOTE — DIETITIAN INITIAL EVALUATION ADULT - REASON FOR ADMISSION
83yo Female with PMH of CHF T2DM, Obesity, Rectal CA, HLD, HTN, Breast CA. Pt admitted on 9/7 fluid overload in the setting of acute on chronic heart failure.

## 2022-09-10 NOTE — PROGRESS NOTE ADULT - ASSESSMENT
Assessment  DMT2: 82y Female with DM T2 with hyperglycemia, A1C 9.4%, was on oral meds at home, now on basal insulin and coverage, blood sugars improving, no hypoglycemic episode,  eating meals, appears comfortable.  CHF: on medications, no chest pain, stable, monitored.  HTN: Controlled,  on antihypertensive medications.  CKD: Monitor labs/BMP,   Obesity: No strict exercise routines, not on any weight loss program, neither on low calorie diet.        Dior Monroy MD  Cell: [phone]  Office: [phone]

## 2022-09-10 NOTE — DIETITIAN INITIAL EVALUATION ADULT - OTHER INFO
GI/Intake:   -Per flowsheet, 51-75% intake of meals   -Last BM documented ; no bowel regimen noted   -Pt denies nausea/vomiting, diarrhea/constipation    Endo:   -Hx of T2DM; latest A1c: 9.4% - uncontrolled   -Endorses taking DM medication; unable to recall name of meds; reports taking BG levels via FS every morning (190-300mg/dL)   -16 units long acting, 7 units short acting, SSI ordered in-house   -Pt reports monitoring carbohydrate intake at home and continues to see elevated BG levels; reinforced importance of appropriate portion sizes of carbs and pairing carbs with proteins    CV:   -Hx of CHF; presenting fluid overload and with acute exacerbation   -Bumex ordered; monitor lytes closely   -Reinforced importance of limiting sodium intake and obtaining daily weights     Weight Hx:   -Current dosin pounds   -Per previous RD note: 178 pounds (3/11/21)   -Pt endorses weight gain 2/2 fluid retention

## 2022-09-10 NOTE — DIETITIAN INITIAL EVALUATION ADULT - PERSON TAUGHT/METHOD
Spend ~15 minutes reviewing CHF and DM education with patient at bedside/verbal instruction/patient instructed

## 2022-09-10 NOTE — DIETITIAN INITIAL EVALUATION ADULT - PERTINENT LABORATORY DATA
09-10    137  |  98  |  43<H>  ----------------------------<  161<H>  5.1   |  26  |  1.83<H>    Ca    9.3      10 Sep 2022 11:53    POCT Blood Glucose.: 142 mg/dL (09-10-22 @ 11:31)  A1C with Estimated Average Glucose Result: 9.4 % (09-09-22 @ 07:27)  A1C with Estimated Average Glucose Result: 9.4 % (09-08-22 @ 12:44)  A1C with Estimated Average Glucose Result: 8.0 % (01-09-22 @ 10:13)

## 2022-09-10 NOTE — PROGRESS NOTE ADULT - SUBJECTIVE AND OBJECTIVE BOX
Surgery Progress Note     Subjective/24hour Events:   Patient seen and examined.   No acute events overnight. NO complaints this AM.     Vital Signs:  Vital Signs Last 24 Hrs  T(C): 36.7 (10 Sep 2022 08:30), Max: 36.7 (10 Sep 2022 08:30)  T(F): 98.1 (10 Sep 2022 08:30), Max: 98.1 (10 Sep 2022 08:30)  HR: 64 (10 Sep 2022 08:30) (64 - 68)  BP: 126/53 (10 Sep 2022 08:30) (96/62 - 126/53)  BP(mean): --  RR: 20 (10 Sep 2022 08:30) (17 - 20)  SpO2: 94% (10 Sep 2022 08:30) (94% - 95%)    Parameters below as of 10 Sep 2022 08:30  Patient On (Oxygen Delivery Method): room air        CAPILLARY BLOOD GLUCOSE      POCT Blood Glucose.: 150 mg/dL (10 Sep 2022 07:55)  POCT Blood Glucose.: 249 mg/dL (09 Sep 2022 23:07)  POCT Blood Glucose.: 272 mg/dL (09 Sep 2022 21:38)  POCT Blood Glucose.: 138 mg/dL (09 Sep 2022 16:37)  POCT Blood Glucose.: 277 mg/dL (09 Sep 2022 12:04)      I&O's Detail    09 Sep 2022 07:01  -  10 Sep 2022 07:00  --------------------------------------------------------  IN:    Oral Fluid: 240 mL  Total IN: 240 mL    OUT:    Voided (mL): 900 mL  Total OUT: 900 mL    Total NET: -660 mL          MEDICATIONS  (STANDING):  amLODIPine   Tablet 5 milliGRAM(s) Oral daily  apixaban 2.5 milliGRAM(s) Oral two times a day  aspirin  chewable 81 milliGRAM(s) Oral daily  atorvastatin 40 milliGRAM(s) Oral at bedtime  buMETAnide Injectable 1 milliGRAM(s) IV Push every 12 hours  dextrose 5%. 1000 milliLiter(s) (50 mL/Hr) IV Continuous <Continuous>  dextrose 5%. 1000 milliLiter(s) (100 mL/Hr) IV Continuous <Continuous>  dextrose 50% Injectable 25 Gram(s) IV Push once  dextrose 50% Injectable 12.5 Gram(s) IV Push once  dextrose 50% Injectable 25 Gram(s) IV Push once  famotidine    Tablet 20 milliGRAM(s) Oral daily  gabapentin 200 milliGRAM(s) Oral three times a day  glucagon  Injectable 1 milliGRAM(s) IntraMuscular once  insulin glargine Injectable (LANTUS) 16 Unit(s) SubCutaneous at bedtime  insulin lispro (ADMELOG) corrective regimen sliding scale   SubCutaneous three times a day before meals  insulin lispro (ADMELOG) corrective regimen sliding scale   SubCutaneous at bedtime  insulin lispro Injectable (ADMELOG) 7 Unit(s) SubCutaneous three times a day before meals  metoprolol succinate  milliGRAM(s) Oral daily  spironolactone 12.5 milliGRAM(s) Oral daily    MEDICATIONS  (PRN):  dextrose Oral Gel 15 Gram(s) Oral once PRN Blood Glucose LESS THAN 70 milliGRAM(s)/deciliter  melatonin 3 milliGRAM(s) Oral at bedtime PRN Insomnia      Physical Exam:  Gen: NAD.  Lungs: Non labored breathing.   Ab: Soft, nontender, nondistended.   : Voiding freely, PVR 10    Labs:    09-09    139  |  98  |  32<H>  ----------------------------<  168<H>  4.1   |  24  |  1.77<H>    Ca    9.7      09 Sep 2022 07:23  Phos  3.2     09-08  Mg     2.0     09-08    TPro  8.4<H>  /  Alb  4.3  /  TBili  0.5  /  DBili  x   /  AST  14  /  ALT  9<L>  /  AlkPhos  99  09-08    LIVER FUNCTIONS - ( 08 Sep 2022 12:44 )  Alb: 4.3 g/dL / Pro: 8.4 g/dL / ALK PHOS: 99 U/L / ALT: 9 U/L / AST: 14 U/L / GGT: x                                 14.0   9.28  )-----------( 302      ( 09 Sep 2022 07:03 )             45.1

## 2022-09-10 NOTE — PROGRESS NOTE ADULT - SUBJECTIVE AND OBJECTIVE BOX
Chief complaint    Patient is a 82y old  Female who presents with a chief complaint of 83yo Female with PMH of CHF T2DM, Obesity, Rectal CA, HLD, HTN, Breast CA. Pt admitted on 9/7 fluid overload in the setting of acute on chronic heart failure.      (10 Sep 2022 13:13)   Review of systems  Patient in bed, appears comfortable.    Labs and Fingersticks  CAPILLARY BLOOD GLUCOSE      POCT Blood Glucose.: 130 mg/dL (10 Sep 2022 16:43)  POCT Blood Glucose.: 142 mg/dL (10 Sep 2022 11:31)  POCT Blood Glucose.: 150 mg/dL (10 Sep 2022 07:55)  POCT Blood Glucose.: 249 mg/dL (09 Sep 2022 23:07)  POCT Blood Glucose.: 272 mg/dL (09 Sep 2022 21:38)      Anion Gap, Serum: 13 (09-10 @ 11:53)  Anion Gap, Serum: 17 (09-09 @ 07:23)      Calcium, Total Serum: 9.3 (09-10 @ 11:53)  Calcium, Total Serum: 9.7 (09-09 @ 07:23)          09-10    137  |  98  |  43<H>  ----------------------------<  161<H>  5.1   |  26  |  1.83<H>    Ca    9.3      10 Sep 2022 11:53                          14.0   9.28  )-----------( 302      ( 09 Sep 2022 07:03 )             45.1     Medications  MEDICATIONS  (STANDING):  amLODIPine   Tablet 5 milliGRAM(s) Oral daily  apixaban 2.5 milliGRAM(s) Oral two times a day  aspirin  chewable 81 milliGRAM(s) Oral daily  atorvastatin 40 milliGRAM(s) Oral at bedtime  buMETAnide Injectable 1 milliGRAM(s) IV Push every 12 hours  cefTRIAXone   IVPB 1000 milliGRAM(s) IV Intermittent every 24 hours  dextrose 5%. 1000 milliLiter(s) (100 mL/Hr) IV Continuous <Continuous>  dextrose 5%. 1000 milliLiter(s) (50 mL/Hr) IV Continuous <Continuous>  dextrose 50% Injectable 25 Gram(s) IV Push once  dextrose 50% Injectable 12.5 Gram(s) IV Push once  dextrose 50% Injectable 25 Gram(s) IV Push once  famotidine    Tablet 20 milliGRAM(s) Oral daily  gabapentin 200 milliGRAM(s) Oral three times a day  glucagon  Injectable 1 milliGRAM(s) IntraMuscular once  insulin glargine Injectable (LANTUS) 16 Unit(s) SubCutaneous at bedtime  insulin lispro (ADMELOG) corrective regimen sliding scale   SubCutaneous three times a day before meals  insulin lispro (ADMELOG) corrective regimen sliding scale   SubCutaneous at bedtime  insulin lispro Injectable (ADMELOG) 7 Unit(s) SubCutaneous three times a day before meals  metoprolol succinate  milliGRAM(s) Oral daily  spironolactone 12.5 milliGRAM(s) Oral daily      Physical Exam  General: Patient comfortable in bed  Vital Signs Last 12 Hrs  T(F): 97.3 (09-10-22 @ 16:10), Max: 97.3 (09-10-22 @ 16:10)  HR: 74 (09-10-22 @ 16:10) (74 - 74)  BP: 124/56 (09-10-22 @ 16:10) (124/56 - 124/56)  BP(mean): --  RR: 16 (09-10-22 @ 16:10) (16 - 16)  SpO2: 94% (09-10-22 @ 16:10) (94% - 94%)  Neck: No palpable thyroid nodules.  CVS: S1S2, No murmurs  Respiratory: No wheezing, no crepitations  GI: Abdomen soft, bowel sounds positive  Musculoskeletal:  edema lower extremities.     Diagnostics    Free Thyroxine, Serum: AM Sched. Collection: 10-Sep-2022 06:00 (09-09 @ 13:27)  Thyroid Stimulating Hormone, Serum: AM Sched. Collection: 10-Sep-2022 06:00 (09-09 @ 13:27)

## 2022-09-10 NOTE — PROGRESS NOTE ADULT - SUBJECTIVE AND OBJECTIVE BOX
[facility] Division of Hospital Medicine  Margy Almaraz MD M-F, 8A-5P: MS Teams, Pager: [phone]  Other Times: Ext: [phone], Pager: [phone]      Patient is a 82y old  Female who presents with a chief complaint of 83yo Female with PMH of CHF T2DM, Obesity, Rectal CA, HLD, HTN, Breast CA. Pt admitted on  fluid overload in the setting of acute on chronic heart failure.      (10 Sep 2022 13:13)      SUBJECTIVE / OVERNIGHT EVENTS:    [place]  on hospital  ipad: ID [id number]  Patient was examined this morning. She is reporting increased urinary frequency, BL flank pain and that "it hurts to urinate." Denies hematuria. Reports her abdomen feels "swollen." Had a regular BM this morning. Denies fever, chills, nausea, chest pain. Complaining of intermittent dyspnea. Denies palpitations       ADDITIONAL REVIEW OF SYSTEMS:    MEDICATIONS  (STANDING):  amLODIPine   Tablet 5 milliGRAM(s) Oral daily  apixaban 2.5 milliGRAM(s) Oral two times a day  aspirin  chewable 81 milliGRAM(s) Oral daily  atorvastatin 40 milliGRAM(s) Oral at bedtime  buMETAnide Injectable 1 milliGRAM(s) IV Push every 12 hours  cefTRIAXone   IVPB 1000 milliGRAM(s) IV Intermittent every 24 hours  dextrose 5%. 1000 milliLiter(s) (50 mL/Hr) IV Continuous <Continuous>  dextrose 5%. 1000 milliLiter(s) (100 mL/Hr) IV Continuous <Continuous>  dextrose 50% Injectable 25 Gram(s) IV Push once  dextrose 50% Injectable 12.5 Gram(s) IV Push once  dextrose 50% Injectable 25 Gram(s) IV Push once  famotidine    Tablet 20 milliGRAM(s) Oral daily  gabapentin 200 milliGRAM(s) Oral three times a day  glucagon  Injectable 1 milliGRAM(s) IntraMuscular once  insulin glargine Injectable (LANTUS) 16 Unit(s) SubCutaneous at bedtime  insulin lispro (ADMELOG) corrective regimen sliding scale   SubCutaneous three times a day before meals  insulin lispro (ADMELOG) corrective regimen sliding scale   SubCutaneous at bedtime  insulin lispro Injectable (ADMELOG) 7 Unit(s) SubCutaneous three times a day before meals  metoprolol succinate  milliGRAM(s) Oral daily  spironolactone 12.5 milliGRAM(s) Oral daily    MEDICATIONS  (PRN):  dextrose Oral Gel 15 Gram(s) Oral once PRN Blood Glucose LESS THAN 70 milliGRAM(s)/deciliter  melatonin 3 milliGRAM(s) Oral at bedtime PRN Insomnia      CAPILLARY BLOOD GLUCOSE      POCT Blood Glucose.: 142 mg/dL (10 Sep 2022 11:31)  POCT Blood Glucose.: 150 mg/dL (10 Sep 2022 07:55)  POCT Blood Glucose.: 249 mg/dL (09 Sep 2022 23:07)  POCT Blood Glucose.: 272 mg/dL (09 Sep 2022 21:38)  POCT Blood Glucose.: 138 mg/dL (09 Sep 2022 16:37)      I&O's Summary    09 Sep 2022 07:01  -  10 Sep 2022 07:00  --------------------------------------------------------  IN: 240 mL / OUT: 900 mL / NET: -660 mL    10 Sep 2022 07:01  -  10 Sep 2022 15:39  --------------------------------------------------------  IN: 236 mL / OUT: 0 mL / NET: 236 mL        Daily     Daily Weight in k.4 (10 Sep 2022 09:18)    PHYSICAL EXAM:  Vital Signs Last 24 Hrs  T(C): 36.7 (10 Sep 2022 08:30), Max: 36.7 (10 Sep 2022 08:30)  T(F): 98.1 (10 Sep 2022 08:30), Max: 98.1 (10 Sep 2022 08:30)  HR: 64 (10 Sep 2022 08:30) (64 - 68)  BP: 126/53 (10 Sep 2022 08:30) (96/62 - 126/53)  BP(mean): --  RR: 20 (10 Sep 2022 08:30) (17 - 20)  SpO2: 94% (10 Sep 2022 08:30) (94% - 95%)    Parameters below as of 10 Sep 2022 08:30  Patient On (Oxygen Delivery Method): room air      CONSTITUTIONAL: NAD, well-developed  EYES: PERRLA; conjunctiva and sclera clear  ENMT: Moist oral mucosa,  NECK: Supple,  RESPIRATORY: Normal respiratory effort; lungs are clear to auscultation bilaterally  CARDIOVASCULAR: Regular rate and rhythm, normal S1 and S2, no murmur/rub/gallop; No lower extremity edema; Peripheral pulses are 2+ bilaterally  ABDOMEN: Nontender to palpation, normoactive bowel sounds, no rebound/guarding;   BACK: BL flank tenderness   MUSCULOSKELETAL:  no clubbing or cyanosis of digits; no joint swelling or tenderness to palpation, moving all extremities   PSYCH: A+O to person, place, and time; affect appropriate  SKIN: No rashes; no palpable lesions      LABS:                        14.0   9.28  )-----------( 302      ( 09 Sep 2022 07:03 )             45.1     09-10    137  |  98  |  43<H>  ----------------------------<  161<H>  5.1   |  26  |  1.83<H>    Ca    9.3      10 Sep 2022 11:53                  Culture - Urine (collected 08 Sep 2022 16:42)  Source: Clean Catch Clean Catch (Midstream)  Preliminary Report (10 Sep 2022 08:50):    >100,000 CFU/ml Escherichia coli      SARS-CoV-2: NotDetec (07 Sep 2022 19:04)  COVID-19 PCR: NotDetec (07 Sep 2022 12:16)      RADIOLOGY & ADDITIONAL TESTS:  Results Reviewed:   Imaging Personally Reviewed:  Electrocardiogram Personally Reviewed:    COORDINATION OF CARE:  Care Discussed with Consultants/Other Providers [Y/N]:  Prior or Outpatient Records Reviewed [Y/N]:

## 2022-09-11 LAB
-  AMIKACIN: SIGNIFICANT CHANGE UP
-  AMOXICILLIN/CLAVULANIC ACID: SIGNIFICANT CHANGE UP
-  AMPICILLIN/SULBACTAM: SIGNIFICANT CHANGE UP
-  AMPICILLIN: SIGNIFICANT CHANGE UP
-  AZTREONAM: SIGNIFICANT CHANGE UP
-  CEFAZOLIN: SIGNIFICANT CHANGE UP
-  CEFEPIME: SIGNIFICANT CHANGE UP
-  CEFTRIAXONE: SIGNIFICANT CHANGE UP
-  CIPROFLOXACIN: SIGNIFICANT CHANGE UP
-  ERTAPENEM: SIGNIFICANT CHANGE UP
-  GENTAMICIN: SIGNIFICANT CHANGE UP
-  IMIPENEM: SIGNIFICANT CHANGE UP
-  LEVOFLOXACIN: SIGNIFICANT CHANGE UP
-  MEROPENEM: SIGNIFICANT CHANGE UP
-  NITROFURANTOIN: SIGNIFICANT CHANGE UP
-  PIPERACILLIN/TAZOBACTAM: SIGNIFICANT CHANGE UP
-  TIGECYCLINE: SIGNIFICANT CHANGE UP
-  TOBRAMYCIN: SIGNIFICANT CHANGE UP
-  TRIMETHOPRIM/SULFAMETHOXAZOLE: SIGNIFICANT CHANGE UP
ANION GAP SERPL CALC-SCNC: 13 MMOL/L — SIGNIFICANT CHANGE UP (ref 5–17)
BUN SERPL-MCNC: 45 MG/DL — HIGH (ref 7–23)
CALCIUM SERPL-MCNC: 9.1 MG/DL — SIGNIFICANT CHANGE UP (ref 8.4–10.5)
CHLORIDE SERPL-SCNC: 101 MMOL/L — SIGNIFICANT CHANGE UP (ref 96–108)
CO2 SERPL-SCNC: 26 MMOL/L — SIGNIFICANT CHANGE UP (ref 22–31)
CREAT SERPL-MCNC: 1.89 MG/DL — HIGH (ref 0.5–1.3)
CULTURE RESULTS: SIGNIFICANT CHANGE UP
EGFR: 26 ML/MIN/1.73M2 — LOW
GLUCOSE BLDC GLUCOMTR-MCNC: 133 MG/DL — HIGH (ref 70–99)
GLUCOSE BLDC GLUCOMTR-MCNC: 160 MG/DL — HIGH (ref 70–99)
GLUCOSE BLDC GLUCOMTR-MCNC: 163 MG/DL — HIGH (ref 70–99)
GLUCOSE BLDC GLUCOMTR-MCNC: 182 MG/DL — HIGH (ref 70–99)
GLUCOSE SERPL-MCNC: 153 MG/DL — HIGH (ref 70–99)
HCT VFR BLD CALC: 41.5 % — SIGNIFICANT CHANGE UP (ref 34.5–45)
HGB BLD-MCNC: 12.8 G/DL — SIGNIFICANT CHANGE UP (ref 11.5–15.5)
MCHC RBC-ENTMCNC: 29 PG — SIGNIFICANT CHANGE UP (ref 27–34)
MCHC RBC-ENTMCNC: 30.8 GM/DL — LOW (ref 32–36)
MCV RBC AUTO: 93.9 FL — SIGNIFICANT CHANGE UP (ref 80–100)
METHOD TYPE: SIGNIFICANT CHANGE UP
NRBC # BLD: 0 /100 WBCS — SIGNIFICANT CHANGE UP (ref 0–0)
ORGANISM # SPEC MICROSCOPIC CNT: SIGNIFICANT CHANGE UP
ORGANISM # SPEC MICROSCOPIC CNT: SIGNIFICANT CHANGE UP
PLATELET # BLD AUTO: 266 K/UL — SIGNIFICANT CHANGE UP (ref 150–400)
POTASSIUM SERPL-MCNC: 3.8 MMOL/L — SIGNIFICANT CHANGE UP (ref 3.5–5.3)
POTASSIUM SERPL-SCNC: 3.8 MMOL/L — SIGNIFICANT CHANGE UP (ref 3.5–5.3)
RBC # BLD: 4.42 M/UL — SIGNIFICANT CHANGE UP (ref 3.8–5.2)
RBC # FLD: 15.1 % — HIGH (ref 10.3–14.5)
SODIUM SERPL-SCNC: 140 MMOL/L — SIGNIFICANT CHANGE UP (ref 135–145)
SPECIMEN SOURCE: SIGNIFICANT CHANGE UP
WBC # BLD: 8.11 K/UL — SIGNIFICANT CHANGE UP (ref 3.8–10.5)
WBC # FLD AUTO: 8.11 K/UL — SIGNIFICANT CHANGE UP (ref 3.8–10.5)

## 2022-09-11 PROCEDURE — 99233 SBSQ HOSP IP/OBS HIGH 50: CPT

## 2022-09-11 RX ORDER — LIDOCAINE 4 G/100G
1 CREAM TOPICAL EVERY 24 HOURS
Refills: 0 | Status: DISCONTINUED | OUTPATIENT
Start: 2022-09-11 | End: 2022-09-16

## 2022-09-11 RX ORDER — POTASSIUM CHLORIDE 20 MEQ
20 PACKET (EA) ORAL ONCE
Refills: 0 | Status: COMPLETED | OUTPATIENT
Start: 2022-09-11 | End: 2022-09-11

## 2022-09-11 RX ORDER — POLYETHYLENE GLYCOL 3350 17 G/17G
17 POWDER, FOR SOLUTION ORAL DAILY
Refills: 0 | Status: DISCONTINUED | OUTPATIENT
Start: 2022-09-11 | End: 2022-09-16

## 2022-09-11 RX ADMIN — GABAPENTIN 200 MILLIGRAM(S): 400 CAPSULE ORAL at 06:14

## 2022-09-11 RX ADMIN — SPIRONOLACTONE 12.5 MILLIGRAM(S): 25 TABLET, FILM COATED ORAL at 06:10

## 2022-09-11 RX ADMIN — Medication 20 MILLIEQUIVALENT(S): at 17:03

## 2022-09-11 RX ADMIN — LIDOCAINE 1 PATCH: 4 CREAM TOPICAL at 17:00

## 2022-09-11 RX ADMIN — Medication 1: at 08:04

## 2022-09-11 RX ADMIN — GABAPENTIN 200 MILLIGRAM(S): 400 CAPSULE ORAL at 22:15

## 2022-09-11 RX ADMIN — Medication 7 UNIT(S): at 17:01

## 2022-09-11 RX ADMIN — APIXABAN 2.5 MILLIGRAM(S): 2.5 TABLET, FILM COATED ORAL at 06:11

## 2022-09-11 RX ADMIN — Medication 81 MILLIGRAM(S): at 12:14

## 2022-09-11 RX ADMIN — Medication 7 UNIT(S): at 12:14

## 2022-09-11 RX ADMIN — BUMETANIDE 1 MILLIGRAM(S): 0.25 INJECTION INTRAMUSCULAR; INTRAVENOUS at 06:09

## 2022-09-11 RX ADMIN — FAMOTIDINE 20 MILLIGRAM(S): 10 INJECTION INTRAVENOUS at 12:15

## 2022-09-11 RX ADMIN — BUMETANIDE 1 MILLIGRAM(S): 0.25 INJECTION INTRAMUSCULAR; INTRAVENOUS at 17:00

## 2022-09-11 RX ADMIN — Medication 7 UNIT(S): at 08:05

## 2022-09-11 RX ADMIN — ATORVASTATIN CALCIUM 40 MILLIGRAM(S): 80 TABLET, FILM COATED ORAL at 22:15

## 2022-09-11 RX ADMIN — GABAPENTIN 200 MILLIGRAM(S): 400 CAPSULE ORAL at 13:05

## 2022-09-11 RX ADMIN — Medication 150 MILLIGRAM(S): at 06:10

## 2022-09-11 RX ADMIN — APIXABAN 2.5 MILLIGRAM(S): 2.5 TABLET, FILM COATED ORAL at 17:01

## 2022-09-11 RX ADMIN — CEFTRIAXONE 100 MILLIGRAM(S): 500 INJECTION, POWDER, FOR SOLUTION INTRAMUSCULAR; INTRAVENOUS at 17:03

## 2022-09-11 RX ADMIN — Medication 1: at 12:14

## 2022-09-11 RX ADMIN — INSULIN GLARGINE 16 UNIT(S): 100 INJECTION, SOLUTION SUBCUTANEOUS at 22:15

## 2022-09-11 NOTE — PROGRESS NOTE ADULT - ASSESSMENT
Assessment  DMT2: 82y Female with DM T2 with hyperglycemia, A1C 9.4%, was on oral meds at home, now on basal bolus insulin, increased dose, blood sugars improving and in overall acceptable range, no hypoglycemic episode, eating meals, NAD.  CHF: on medications, no chest pain, stable, monitored.  HTN: Controlled,  on antihypertensive medications.  CKD: Monitor labs/BMP,   Obesity: No strict exercise routines, not on any weight loss program, neither on low calorie diet.        Dior Monroy MD  Cell: [phone]  Office: [phone]     Assessment  DMT2: 82y Female with DM T2 with hyperglycemia, A1C 9.4%, was on oral meds at home, now on basal bolus insulin, increased dose, blood  sugars  improving and in overall acceptable range, no hypoglycemic episode, eating meals, NAD.  CHF: on medications, no chest pain, stable, monitored.  HTN: Controlled,  on antihypertensive medications.  CKD: Monitor labs/BMP,   Obesity: No strict exercise routines, not on any weight loss program, neither on low calorie diet.        Dior Monroy MD  Cell: [phone]  Office: [phone]

## 2022-09-11 NOTE — PROGRESS NOTE ADULT - PROBLEM SELECTOR PLAN 5
Continue home meds atorvastatin 40, asa 81      #Back pain:   Evaluation for UTI and hydronephrosis as above  Suspect MSK element. Trial lidocaine patches  OOBTC, continue PT as tolerated

## 2022-09-11 NOTE — PROGRESS NOTE ADULT - SUBJECTIVE AND OBJECTIVE BOX
Pt seen & examined lying in bed. No signs of acute distress.        VITAL:  T(C): , Max: 36.7 (22 @ 17:51)  T(F): , Max: 98 (22 @ 17:51)  HR: 65 (22 @ 09:36)  BP: 105/67 (22 @ 09:36)  BP(mean): --  RR: 19 (22 @ 09:36)  SpO2: 94% (22 @ 09:36)  Wt(kg): --      PHYSICAL EXAM:  General: Alerted, oriented  HEENT: MMM  Lungs :CTA-b/l  Heart: RRR S1S2  Abdomen: Soft, NTND  Ext: edema b/l with skin changes   : no chen      LABS:                        14.0   9.28  )-----------( 302      ( 09 Sep 2022 07:03 )             45.1     Na(139)/K(4.1)/Cl(98)/HCO3(24)/BUN(32)/Cr(1.77)Glu(168)/Ca(9.7)/Mg(--)/PO4(--)     @ 07:23  Na(138)/K(4.3)/Cl(97)/HCO3(26)/BUN(27)/Cr(1.51)Glu(322)/Ca(10.0)/Mg(2.0)/PO4(3.2)     @ 12:44  Na(138)/K(5.0)/Cl(99)/HCO3(25)/BUN(24)/Cr(1.41)Glu(279)/Ca(9.8)/Mg(2.0)/PO4(--)     @ 12:17    Urinalysis Basic - ( 08 Sep 2022 13:33 )    Color: Yellow / Appearance: Slightly Turbid / S.022 / pH: x  Gluc: x / Ketone: Negative  / Bili: Negative / Urobili: Negative   Blood: x / Protein: 100 / Nitrite: Positive   Leuk Esterase: Large / RBC: 4 /hpf /  /HPF   Sq Epi: x / Non Sq Epi: 3 /hpf / Bacteria: Many      Creatinine, Random Urine: 163 mg/dL ( @ 13:49)  Protein/Creatinine Ratio Calculation: 0.6 Ratio ( @ 13:49)    Images:  PROCEDURE DATE:  2022          INTERPRETATION:  CLINICAL INFORMATION: Right hydronephrosis.    COMPARISON: CT chest 2022 and 11/3/2020.    TECHNIQUE: Sonography of the kidneys and bladder.    FINDINGS:  Right kidney: 11.9 cm. Severe hydronephrosis with marked parenchymal   thinning. The right kidney was partially imaged on the chest CT of   11/3/2020, with severe hydronephrosis and parenchymal thinning noted at   that time.    Left kidney: 10.3 cm. No hydronephrosis. No renal mass or calculus   visualized.    Urinary bladder: Not visualized.    IMPRESSION:  Chronic severe right hydronephrosis with diffuse parenchymal thinning.    No left hydronephrosis.    Nonvisualization of the urinary bladder.

## 2022-09-11 NOTE — PROGRESS NOTE ADULT - SUBJECTIVE AND OBJECTIVE BOX
[facility] Division of Hospital Medicine  Margy Almaraz MD M-F, 8A-5P: MS Teams, Pager: [phone]  Other Times: Ext: [phone], Pager: [phone]      Patient is a 82y old  Female who presents with a chief complaint of chf (11 Sep 2022 12:42)      SUBJECTIVE / OVERNIGHT EVENTS:  Patient was examined this morning. Reports that she is still have BL flank pain. Her abdomen still feels swollen and continues to have intermittent dyspnea.       ADDITIONAL REVIEW OF SYSTEMS:    MEDICATIONS  (STANDING):  amLODIPine   Tablet 5 milliGRAM(s) Oral daily  apixaban 2.5 milliGRAM(s) Oral two times a day  aspirin  chewable 81 milliGRAM(s) Oral daily  atorvastatin 40 milliGRAM(s) Oral at bedtime  buMETAnide Injectable 1 milliGRAM(s) IV Push every 12 hours  cefTRIAXone   IVPB 1000 milliGRAM(s) IV Intermittent every 24 hours  dextrose 5%. 1000 milliLiter(s) (100 mL/Hr) IV Continuous <Continuous>  dextrose 5%. 1000 milliLiter(s) (50 mL/Hr) IV Continuous <Continuous>  dextrose 50% Injectable 25 Gram(s) IV Push once  dextrose 50% Injectable 12.5 Gram(s) IV Push once  dextrose 50% Injectable 25 Gram(s) IV Push once  famotidine    Tablet 20 milliGRAM(s) Oral daily  gabapentin 200 milliGRAM(s) Oral three times a day  glucagon  Injectable 1 milliGRAM(s) IntraMuscular once  insulin glargine Injectable (LANTUS) 16 Unit(s) SubCutaneous at bedtime  insulin lispro (ADMELOG) corrective regimen sliding scale   SubCutaneous three times a day before meals  insulin lispro (ADMELOG) corrective regimen sliding scale   SubCutaneous at bedtime  insulin lispro Injectable (ADMELOG) 7 Unit(s) SubCutaneous three times a day before meals  metoprolol succinate  milliGRAM(s) Oral daily  potassium chloride    Tablet ER 20 milliEquivalent(s) Oral once  spironolactone 12.5 milliGRAM(s) Oral daily    MEDICATIONS  (PRN):  dextrose Oral Gel 15 Gram(s) Oral once PRN Blood Glucose LESS THAN 70 milliGRAM(s)/deciliter  melatonin 3 milliGRAM(s) Oral at bedtime PRN Insomnia      CAPILLARY BLOOD GLUCOSE      POCT Blood Glucose.: 182 mg/dL (11 Sep 2022 11:55)  POCT Blood Glucose.: 160 mg/dL (11 Sep 2022 07:45)  POCT Blood Glucose.: 192 mg/dL (10 Sep 2022 21:36)  POCT Blood Glucose.: 130 mg/dL (10 Sep 2022 16:43)      I&O's Summary    10 Sep 2022 07:01  -  11 Sep 2022 07:00  --------------------------------------------------------  IN: 436 mL / OUT: 350 mL / NET: 86 mL        Daily     Daily Weight in k.7 (11 Sep 2022 09:05)    PHYSICAL EXAM:  Vital Signs Last 24 Hrs  T(C): 36.3 (11 Sep 2022 09:05), Max: 36.4 (10 Sep 2022 23:15)  T(F): 97.4 (11 Sep 2022 09:05), Max: 97.5 (10 Sep 2022 23:15)  HR: 64 (11 Sep 2022 09:05) (64 - 74)  BP: 105/65 (11 Sep 2022 09:05) (105/65 - 124/56)  BP(mean): --  RR: 18 (11 Sep 2022 09:05) (16 - 18)  SpO2: 96% (11 Sep 2022 09:05) (94% - 96%)    Parameters below as of 11 Sep 2022 09:05  Patient On (Oxygen Delivery Method): room air      CONSTITUTIONAL: NAD, well-developed  EYES: PERRLA; conjunctiva and sclera clear  ENMT: Moist oral mucosa,  NECK: Supple,  RESPIRATORY: Normal respiratory effort; lungs are clear to auscultation bilaterally  CARDIOVASCULAR: Regular rate and rhythm, normal S1 and S2, no murmur/rub/gallop; No lower extremity edema; Peripheral pulses are 2+ bilaterally  ABDOMEN: Nontender to palpation, normoactive bowel sounds, no rebound/guarding;   BACK: BL flank tenderness   MUSCULOSKELETAL:  no clubbing or cyanosis of digits; no joint swelling or tenderness to palpation, moving all extremities   PSYCH: A+O to person, place, and time; affect appropriate  SKIN: No rashes; no palpable lesions      LABS:                        12.8   8.11  )-----------( 266      ( 11 Sep 2022 06:47 )             41.5     -    140  |  101  |  45<H>  ----------------------------<  153<H>  3.8   |  26  |  1.89<H>    Ca    9.1      11 Sep 2022 06:47                  Culture - Urine (collected 08 Sep 2022 16:42)  Source: Clean Catch Clean Catch (Midstream)  Final Report (11 Sep 2022 12:35):    >100,000 CFU/ml Escherichia coli ESBL  Organism: Escherichia coli ESBL (11 Sep 2022 12:35)  Organism: Escherichia coli ESBL (11 Sep 2022 12:35)      SARS-CoV-2: NotDetec (07 Sep 2022 19:04)  COVID-19 PCR: NotDetec (07 Sep 2022 12:16)      RADIOLOGY & ADDITIONAL TESTS:  Results Reviewed:   Imaging Personally Reviewed:  Electrocardiogram Personally Reviewed:    COORDINATION OF CARE:  Care Discussed with Consultants/Other Providers [Y/N]:  Prior or Outpatient Records Reviewed [Y/N]:

## 2022-09-11 NOTE — PROGRESS NOTE ADULT - PROBLEM SELECTOR PLAN 6
diet: CC + DASH  dvt ppx: hx of dvt, apixaban 2.5 BID      dispo: pending clinical improvement diet: CC + DASH  dvt ppx: hx of dvt, apixaban 2.5 BID  Bowel regimen: miralax qd. monitor for constipation     dispo: pending clinical improvement

## 2022-09-11 NOTE — PROGRESS NOTE ADULT - PROBLEM SELECTOR PLAN 4
GFR 37%  -patient follows [facility] outpatient, consulted [facility]    -sCr worsening in the setting of diuretics   -Diuretics per nephrology recs  -strict Is/Os. Check daily BMP  -avoid nephrotoxic agents  -renally dose all medications    #Hypokalemia: Give K supplement to maintain K >4.

## 2022-09-11 NOTE — PROGRESS NOTE ADULT - ASSESSMENT
ASSESSMENT:  81 yo F wit a PMH of breast CA, DM, HTN, HLD, CHF, chronic RUE lymphedema p/w substernal/epigastric pain, admitted for HF  , nephrology consulted for CKD.    -CKD stage 3/4---- base line creatinine 1.5 mg/dl- azotemia rising likely due to diuresis  -Lytes controlled  -HTN- blood pressure soft  -CHF ---volume overload   -acid base----monitor bicarb while on diuretics  -UTI---on abx  -lymphedema      RECOMMENDATION:  -Trend renal function, BMP, mag, phos daily  -C/W Bumex 1mg IV Q 12 hrs  -Kcl 20 meq x 1 to maintain K > 4   -Avoid nephrotoxins as able  -Dose meds for GFR 30 ml/min      Pascual Cotto NP-C  [facility]  Office: [phone]

## 2022-09-11 NOTE — PROGRESS NOTE ADULT - PROBLEM SELECTOR PLAN 3
CT showing "Suggestion of chronic and severe right-sided hydronephrosis as on 11/3/2020 CT chest"  -May be a source of worsening CKD  -Renal u/s with chronic severe R sided hydronephrosis  -Urology team consulted for evaluation - rec nonurgent renal Lasix scan to evaluate function of R kidney and follow up outpatient with urology  -Monitor bladder scans for PVR      #UTI: patient complaining of urinary symptoms and UA + with urine culture growing e coli >100k  -Started Ceftriaxone x3days (9/10 -  )  -F/U on urine cx sensitivities

## 2022-09-11 NOTE — PROGRESS NOTE ADULT - SUBJECTIVE AND OBJECTIVE BOX
Chief complaint  Patient is a 82y old  Female who presents with a chief complaint of chf (11 Sep 2022 12:42)   Review of systems  Patient in bed, looks comfortable, no hypoglycemic episodes.    Labs and Fingersticks  CAPILLARY BLOOD GLUCOSE      POCT Blood Glucose.: 182 mg/dL (11 Sep 2022 11:55)  POCT Blood Glucose.: 160 mg/dL (11 Sep 2022 07:45)  POCT Blood Glucose.: 192 mg/dL (10 Sep 2022 21:36)  POCT Blood Glucose.: 130 mg/dL (10 Sep 2022 16:43)      Anion Gap, Serum: 13 (09-11 @ 06:47)  Anion Gap, Serum: 13 (09-10 @ 11:53)      Calcium, Total Serum: 9.1 (09-11 @ 06:47)  Calcium, Total Serum: 9.3 (09-10 @ 11:53)          09-11    140  |  101  |  45<H>  ----------------------------<  153<H>  3.8   |  26  |  1.89<H>    Ca    9.1      11 Sep 2022 06:47                          12.8   8.11  )-----------( 266      ( 11 Sep 2022 06:47 )             41.5     Medications  MEDICATIONS  (STANDING):  amLODIPine   Tablet 5 milliGRAM(s) Oral daily  apixaban 2.5 milliGRAM(s) Oral two times a day  aspirin  chewable 81 milliGRAM(s) Oral daily  atorvastatin 40 milliGRAM(s) Oral at bedtime  buMETAnide Injectable 1 milliGRAM(s) IV Push every 12 hours  cefTRIAXone   IVPB 1000 milliGRAM(s) IV Intermittent every 24 hours  dextrose 5%. 1000 milliLiter(s) (100 mL/Hr) IV Continuous <Continuous>  dextrose 5%. 1000 milliLiter(s) (50 mL/Hr) IV Continuous <Continuous>  dextrose 50% Injectable 25 Gram(s) IV Push once  dextrose 50% Injectable 12.5 Gram(s) IV Push once  dextrose 50% Injectable 25 Gram(s) IV Push once  famotidine    Tablet 20 milliGRAM(s) Oral daily  gabapentin 200 milliGRAM(s) Oral three times a day  glucagon  Injectable 1 milliGRAM(s) IntraMuscular once  insulin glargine Injectable (LANTUS) 16 Unit(s) SubCutaneous at bedtime  insulin lispro (ADMELOG) corrective regimen sliding scale   SubCutaneous three times a day before meals  insulin lispro (ADMELOG) corrective regimen sliding scale   SubCutaneous at bedtime  insulin lispro Injectable (ADMELOG) 7 Unit(s) SubCutaneous three times a day before meals  lidocaine   4% Patch 1 Patch Transdermal every 24 hours  metoprolol succinate  milliGRAM(s) Oral daily  polyethylene glycol 3350 17 Gram(s) Oral daily  potassium chloride    Tablet ER 20 milliEquivalent(s) Oral once  spironolactone 12.5 milliGRAM(s) Oral daily      Physical Exam  General: Patient comfortable in bed  Vital Signs Last 12 Hrs  T(F): 97.4 (09-11-22 @ 09:05), Max: 97.4 (09-11-22 @ 09:05)  HR: 64 (09-11-22 @ 09:05) (64 - 64)  BP: 105/65 (09-11-22 @ 09:05) (105/65 - 105/65)  BP(mean): --  RR: 18 (09-11-22 @ 09:05) (18 - 18)  SpO2: 96% (09-11-22 @ 09:05) (96% - 96%)  Neck: No palpable thyroid nodules.  CVS: S1S2, No murmurs  Respiratory: No wheezing, no crepitations  GI: Abdomen soft, bowel sounds positive  Musculoskeletal:  edema lower extremities.   Skin: No skin rashes, no ecchymosis    Diagnostics             Chief complaint  Patient is a 82y old  Female who presents with a chief  complaint of chf (11 Sep 2022 12:42)   Review of systems  Patient in bed, looks comfortable, no hypoglycemic episodes.    Labs and Fingersticks  CAPILLARY BLOOD GLUCOSE      POCT Blood Glucose.: 182 mg/dL (11 Sep 2022 11:55)  POCT Blood Glucose.: 160 mg/dL (11 Sep 2022 07:45)  POCT Blood Glucose.: 192 mg/dL (10 Sep 2022 21:36)  POCT Blood Glucose.: 130 mg/dL (10 Sep 2022 16:43)      Anion Gap, Serum: 13 (09-11 @ 06:47)  Anion Gap, Serum: 13 (09-10 @ 11:53)      Calcium, Total Serum: 9.1 (09-11 @ 06:47)  Calcium, Total Serum: 9.3 (09-10 @ 11:53)          09-11    140  |  101  |  45<H>  ----------------------------<  153<H>  3.8   |  26  |  1.89<H>    Ca    9.1      11 Sep 2022 06:47                          12.8   8.11  )-----------( 266      ( 11 Sep 2022 06:47 )             41.5     Medications  MEDICATIONS  (STANDING):  amLODIPine   Tablet 5 milliGRAM(s) Oral daily  apixaban 2.5 milliGRAM(s) Oral two times a day  aspirin  chewable 81 milliGRAM(s) Oral daily  atorvastatin 40 milliGRAM(s) Oral at bedtime  buMETAnide Injectable 1 milliGRAM(s) IV Push every 12 hours  cefTRIAXone   IVPB 1000 milliGRAM(s) IV Intermittent every 24 hours  dextrose 5%. 1000 milliLiter(s) (100 mL/Hr) IV Continuous <Continuous>  dextrose 5%. 1000 milliLiter(s) (50 mL/Hr) IV Continuous <Continuous>  dextrose 50% Injectable 25 Gram(s) IV Push once  dextrose 50% Injectable 12.5 Gram(s) IV Push once  dextrose 50% Injectable 25 Gram(s) IV Push once  famotidine    Tablet 20 milliGRAM(s) Oral daily  gabapentin 200 milliGRAM(s) Oral three times a day  glucagon  Injectable 1 milliGRAM(s) IntraMuscular once  insulin glargine Injectable (LANTUS) 16 Unit(s) SubCutaneous at bedtime  insulin lispro (ADMELOG) corrective regimen sliding scale   SubCutaneous three times a day before meals  insulin lispro (ADMELOG) corrective regimen sliding scale   SubCutaneous at bedtime  insulin lispro Injectable (ADMELOG) 7 Unit(s) SubCutaneous three times a day before meals  lidocaine   4% Patch 1 Patch Transdermal every 24 hours  metoprolol succinate  milliGRAM(s) Oral daily  polyethylene glycol 3350 17 Gram(s) Oral daily  potassium chloride    Tablet ER 20 milliEquivalent(s) Oral once  spironolactone 12.5 milliGRAM(s) Oral daily      Physical Exam  General: Patient comfortable in bed  Vital Signs Last 12 Hrs  T(F): 97.4 (09-11-22 @ 09:05), Max: 97.4 (09-11-22 @ 09:05)  HR: 64 (09-11-22 @ 09:05) (64 - 64)  BP: 105/65 (09-11-22 @ 09:05) (105/65 - 105/65)  BP(mean): --  RR: 18 (09-11-22 @ 09:05) (18 - 18)  SpO2: 96% (09-11-22 @ 09:05) (96% - 96%)  Neck: No palpable thyroid nodules.  CVS: S1S2, No murmurs  Respiratory: No wheezing, no crepitations  GI: Abdomen soft, bowel sounds positive  Musculoskeletal:  edema lower extremities.   Skin: No skin rashes, no ecchymosis    Diagnostics

## 2022-09-12 LAB
ANION GAP SERPL CALC-SCNC: 12 MMOL/L — SIGNIFICANT CHANGE UP (ref 5–17)
BUN SERPL-MCNC: 50 MG/DL — HIGH (ref 7–23)
CALCIUM SERPL-MCNC: 9.4 MG/DL — SIGNIFICANT CHANGE UP (ref 8.4–10.5)
CHLORIDE SERPL-SCNC: 100 MMOL/L — SIGNIFICANT CHANGE UP (ref 96–108)
CO2 SERPL-SCNC: 27 MMOL/L — SIGNIFICANT CHANGE UP (ref 22–31)
CREAT SERPL-MCNC: 2.01 MG/DL — HIGH (ref 0.5–1.3)
EGFR: 24 ML/MIN/1.73M2 — LOW
GLUCOSE BLDC GLUCOMTR-MCNC: 168 MG/DL — HIGH (ref 70–99)
GLUCOSE BLDC GLUCOMTR-MCNC: 169 MG/DL — HIGH (ref 70–99)
GLUCOSE BLDC GLUCOMTR-MCNC: 175 MG/DL — HIGH (ref 70–99)
GLUCOSE BLDC GLUCOMTR-MCNC: 179 MG/DL — HIGH (ref 70–99)
GLUCOSE SERPL-MCNC: 177 MG/DL — HIGH (ref 70–99)
MAGNESIUM SERPL-MCNC: 1.9 MG/DL — SIGNIFICANT CHANGE UP (ref 1.6–2.6)
PHOSPHATE SERPL-MCNC: 3.7 MG/DL — SIGNIFICANT CHANGE UP (ref 2.5–4.5)
POTASSIUM SERPL-MCNC: 4.4 MMOL/L — SIGNIFICANT CHANGE UP (ref 3.5–5.3)
POTASSIUM SERPL-SCNC: 4.4 MMOL/L — SIGNIFICANT CHANGE UP (ref 3.5–5.3)
SODIUM SERPL-SCNC: 139 MMOL/L — SIGNIFICANT CHANGE UP (ref 135–145)

## 2022-09-12 PROCEDURE — 99233 SBSQ HOSP IP/OBS HIGH 50: CPT

## 2022-09-12 RX ORDER — ERTAPENEM SODIUM 1 G/1
500 INJECTION, POWDER, LYOPHILIZED, FOR SOLUTION INTRAMUSCULAR; INTRAVENOUS ONCE
Refills: 0 | Status: COMPLETED | OUTPATIENT
Start: 2022-09-12 | End: 2022-09-12

## 2022-09-12 RX ORDER — ERTAPENEM SODIUM 1 G/1
INJECTION, POWDER, LYOPHILIZED, FOR SOLUTION INTRAMUSCULAR; INTRAVENOUS
Refills: 0 | Status: COMPLETED | OUTPATIENT
Start: 2022-09-12 | End: 2022-09-16

## 2022-09-12 RX ORDER — ERTAPENEM SODIUM 1 G/1
500 INJECTION, POWDER, LYOPHILIZED, FOR SOLUTION INTRAMUSCULAR; INTRAVENOUS EVERY 24 HOURS
Refills: 0 | Status: COMPLETED | OUTPATIENT
Start: 2022-09-13 | End: 2022-09-15

## 2022-09-12 RX ADMIN — SPIRONOLACTONE 12.5 MILLIGRAM(S): 25 TABLET, FILM COATED ORAL at 05:55

## 2022-09-12 RX ADMIN — LIDOCAINE 1 PATCH: 4 CREAM TOPICAL at 19:55

## 2022-09-12 RX ADMIN — Medication 1: at 17:08

## 2022-09-12 RX ADMIN — INSULIN GLARGINE 16 UNIT(S): 100 INJECTION, SOLUTION SUBCUTANEOUS at 22:40

## 2022-09-12 RX ADMIN — FAMOTIDINE 20 MILLIGRAM(S): 10 INJECTION INTRAVENOUS at 12:05

## 2022-09-12 RX ADMIN — Medication 3 MILLIGRAM(S): at 22:41

## 2022-09-12 RX ADMIN — BUMETANIDE 1 MILLIGRAM(S): 0.25 INJECTION INTRAMUSCULAR; INTRAVENOUS at 17:18

## 2022-09-12 RX ADMIN — Medication 7 UNIT(S): at 08:15

## 2022-09-12 RX ADMIN — Medication 150 MILLIGRAM(S): at 05:54

## 2022-09-12 RX ADMIN — GABAPENTIN 200 MILLIGRAM(S): 400 CAPSULE ORAL at 14:13

## 2022-09-12 RX ADMIN — ATORVASTATIN CALCIUM 40 MILLIGRAM(S): 80 TABLET, FILM COATED ORAL at 22:41

## 2022-09-12 RX ADMIN — LIDOCAINE 1 PATCH: 4 CREAM TOPICAL at 16:14

## 2022-09-12 RX ADMIN — Medication 7 UNIT(S): at 12:06

## 2022-09-12 RX ADMIN — GABAPENTIN 200 MILLIGRAM(S): 400 CAPSULE ORAL at 05:55

## 2022-09-12 RX ADMIN — APIXABAN 2.5 MILLIGRAM(S): 2.5 TABLET, FILM COATED ORAL at 17:09

## 2022-09-12 RX ADMIN — Medication 1: at 12:06

## 2022-09-12 RX ADMIN — LIDOCAINE 1 PATCH: 4 CREAM TOPICAL at 07:00

## 2022-09-12 RX ADMIN — Medication 81 MILLIGRAM(S): at 12:05

## 2022-09-12 RX ADMIN — AMLODIPINE BESYLATE 5 MILLIGRAM(S): 2.5 TABLET ORAL at 05:54

## 2022-09-12 RX ADMIN — Medication 7 UNIT(S): at 17:08

## 2022-09-12 RX ADMIN — APIXABAN 2.5 MILLIGRAM(S): 2.5 TABLET, FILM COATED ORAL at 05:54

## 2022-09-12 RX ADMIN — GABAPENTIN 200 MILLIGRAM(S): 400 CAPSULE ORAL at 22:40

## 2022-09-12 RX ADMIN — POLYETHYLENE GLYCOL 3350 17 GRAM(S): 17 POWDER, FOR SOLUTION ORAL at 12:50

## 2022-09-12 RX ADMIN — BUMETANIDE 1 MILLIGRAM(S): 0.25 INJECTION INTRAMUSCULAR; INTRAVENOUS at 05:57

## 2022-09-12 RX ADMIN — Medication 1: at 08:15

## 2022-09-12 RX ADMIN — ERTAPENEM SODIUM 100 MILLIGRAM(S): 1 INJECTION, POWDER, LYOPHILIZED, FOR SOLUTION INTRAMUSCULAR; INTRAVENOUS at 10:55

## 2022-09-12 NOTE — PROGRESS NOTE ADULT - PROBLEM SELECTOR PLAN 5
Continue home meds atorvastatin 40, asa 81      #Back pain:   Evaluation for UTI and hydronephrosis as above  Suspect MSK element. Continue lidocaine patches  Continue PT as tolerated. discussed with PT, encourage OOBTC during the day

## 2022-09-12 NOTE — CONSULT NOTE ADULT - SUBJECTIVE AND OBJECTIVE BOX
DATE OF SERVICE: 09-12-22 @ 23:19    CHIEF COMPLAINT:Patient is a 82y old  Female who presents with a chief complaint of chf (11 Sep 2022 12:42)      HISTORY OF PRESENT ILLNESS:HPI:  83 yo F wit a PMH of breast CA, DM, HTN, HLD, CHF, chronic RUE lymphedema p/w substernal/epigastric pain, SOB and worsening bilateral lower extremity swelling x 2 days.    pain is localized , non exertional , without palpitations , sob on minimal exertion  no fever /chills   no cough   no N/V/D   complaint with her meds including diuretics and eliquis      (07 Sep 2022 18:08)      PAST MEDICAL & SURGICAL HISTORY:  Breast CA, right  1989 s/p mastectomy ,IV Chemotherapy      HTN (hypertension)      Lymphedema of arm  right arm s/p mastectomy      Hyperlipidemia      Rectal cancer  s/p chemotherapy and  radiation 12 weeks 2/2015 -3/2015      Obese      Type II diabetes mellitus      CHF (congestive heart failure)      DVT of leg (deep venous thrombosis)  right calf DVT  2/2019 pt on Eliquis      Renal insufficiency      S/P mastectomy, right  1989      History of appendectomy  1953      S/P ileostomy  low anterior resection-8/10/15, reversal of ileostomy 2016      S/P colon resection  2015      S/P TKR (total knee replacement), right  2017              MEDICATIONS:  amLODIPine   Tablet 5 milliGRAM(s) Oral daily  apixaban 2.5 milliGRAM(s) Oral two times a day  aspirin  chewable 81 milliGRAM(s) Oral daily  buMETAnide Injectable 1 milliGRAM(s) IV Push every 12 hours  metoprolol succinate  milliGRAM(s) Oral daily  spironolactone 12.5 milliGRAM(s) Oral daily    ertapenem  IVPB          gabapentin 200 milliGRAM(s) Oral three times a day  melatonin 3 milliGRAM(s) Oral at bedtime PRN    famotidine    Tablet 20 milliGRAM(s) Oral daily  polyethylene glycol 3350 17 Gram(s) Oral daily    atorvastatin 40 milliGRAM(s) Oral at bedtime  dextrose 50% Injectable 25 Gram(s) IV Push once  dextrose 50% Injectable 12.5 Gram(s) IV Push once  dextrose 50% Injectable 25 Gram(s) IV Push once  dextrose Oral Gel 15 Gram(s) Oral once PRN  glucagon  Injectable 1 milliGRAM(s) IntraMuscular once  insulin glargine Injectable (LANTUS) 16 Unit(s) SubCutaneous at bedtime  insulin lispro (ADMELOG) corrective regimen sliding scale   SubCutaneous three times a day before meals  insulin lispro (ADMELOG) corrective regimen sliding scale   SubCutaneous at bedtime  insulin lispro Injectable (ADMELOG) 7 Unit(s) SubCutaneous three times a day before meals    dextrose 5%. 1000 milliLiter(s) IV Continuous <Continuous>  dextrose 5%. 1000 milliLiter(s) IV Continuous <Continuous>  lidocaine   4% Patch 1 Patch Transdermal every 24 hours      FAMILY HISTORY:  Family history of diabetes mellitus in mother    Family history of diabetes mellitus in son    Family history of heart attack (Child)        Non-contributory    SOCIAL HISTORY:    [ } not a smoker    Allergies    CT scan dye (Hives)  penicillin (Unknown)    Intolerances    	    REVIEW OF SYSTEMS:  CONSTITUTIONAL: No fever  EYES: No eye pain, visual disturbances, or discharge  ENMT:  No difficulty hearing, tinnitus  NECK: No pain or stiffness  RESPIRATORY: No cough, wheezing, +SOB though improved  CARDIOVASCULAR: No chest pain, palpitations, passing out, dizziness, or leg swelling  GASTROINTESTINAL:  No nausea, vomiting, diarrhea or constipation. No melena.  GENITOURINARY: No dysuria, hematuria  NEUROLOGICAL: No stroke like symptoms  SKIN: No burning or lesions   ENDOCRINE: No heat or cold intolerance  MUSCULOSKELETAL: No joint pain or swelling  PSYCHIATRIC: No  anxiety, mood swings  HEME/LYMPH: No bleeding gums  ALLERGY AND IMMUNOLOGIC: No hives or eczema	    All other ROS negative    PHYSICAL EXAM:  T(C): 36.5 (09-12-22 @ 16:25), Max: 36.6 (09-12-22 @ 09:17)  HR: 67 (09-12-22 @ 16:25) (66 - 67)  BP: 133/83 (09-12-22 @ 16:25) (133/83 - 134/55)  RR: 18 (09-12-22 @ 16:25) (18 - 18)  SpO2: 93% (09-12-22 @ 16:25) (93% - 93%)  Wt(kg): --  I&O's Summary    11 Sep 2022 07:01  -  12 Sep 2022 07:00  --------------------------------------------------------  IN: 350 mL / OUT: 400 mL / NET: -50 mL    12 Sep 2022 07:01  -  12 Sep 2022 23:19  --------------------------------------------------------  IN: 50 mL / OUT: 700 mL / NET: -650 mL        Appearance: Normal	  HEENT:   Normal oral mucosa, EOMI	  Cardiovascular:  S1 S2, No JVD,    Respiratory: Lungs clear to auscultation	  Psychiatry: Alert  Gastrointestinal:  Soft, Non-tender, + BS	  Skin: No rashes   Neurologic: Non-focal  Extremities:  No edema  Vascular: Peripheral pulses palpable    	    	  	  CARDIAC MARKERS:  Labs personally reviewed by me                                  12.8   8.11  )-----------( 266      ( 11 Sep 2022 06:47 )             41.5     09-12    139  |  100  |  50<H>  ----------------------------<  177<H>  4.4   |  27  |  2.01<H>    Ca    9.4      12 Sep 2022 07:22  Phos  3.7     09-12  Mg     1.9     09-12            EKG: Personally reviewed by me - nsr  Radiology: Personally reviewed by me - CXR clear lungs      < from: TTE with Doppler (w/Cont) (09.09.22 @ 09:49) >  Conclusions:  1. Mild mitral annular calcification. Mitral annular  dilatation with normal leaflet opening. Mild mitral  regurgitation.  2. Aortic valve not well visualized; appears calcified.  Minimal aortic regurgitation.  3. Endocardial visualization enhanced with intravenous  injection of Ultrasonic Enhancing Agent (Definity). Left  ventricle suboptimally visualized despite intravenous  ultrasonic enhancing agent; the apex is foreshortened on  apical images. Overall normal left ventricular systolic  function.  4. Increased E/e' suggestive of elevated left ventricular  filling pressure.  5. The right ventricle is not well visualized; grossly  normal right ventricular size and systolic function.  *** Compared with echocardiogram of 1/11/2022, increased  E/e' suggestive of increased LV filling pressure is noted  on today's study.    < end of copied text >        Assessment and Plan:   · Assessment	  Patient is an 82yof w/ hx of HTN, hyperlipidemia, type 2 diabetes mellitus, breast cancer status post right partial mastectomy, rectal carcinoma status post resection, anemia, diastolic heart failure, hx of right lower extremity DVT, CAD s/p 2 vessel CABG 11/9/2020 and renal insufficiency who was admitted for volume overload.     Problem/Plan - 1:  ·  Problem: Acute on chronic diastolic heart failure.   ·  Plan: Noncompliance with OP diuretic regimen  Repeat TTE with impaired E/e' c/w increased filling pressures LV and volume overload  -Continues to complain of SOB though improved from admission  + JVD  - Will repeat proBNP and CXR  -Continue Bumex 1mg IV BID for today, pending repeat Cr in AM, proBNP trend and CXR and will reassess current ananth  - I emailed OP cardio Dr Hagan updating him on care    Problem/Plan - 2:  ·  Problem: Stage 3 chronic kidney disease.   ·  Plan: Renal FU appreciated  - recommending continuing with IV diuresis      Problem/Plan - 3:  ·  Problem: Diabetes mellitus with hyperglycemia.   ·  Plan: A1c on Aug 23, 2022 as 9.6  -Continue Insulin Lantus 16u HS, Lispro 7u TIDwm       Problem/Plan - 4:  ·  Problem: Hyperlipidemia.   ·  Plan: Continue home meds atorvastatin 40mg            Differential diagnosis and plan of care discussed with patient after the evaluation. Counseling on diet, nutritional counseling, weight management, exercise and medication compliance was done.   Advanced care planning/advanced directives discussed with patient/family. DNR status including forceful chest compressions to attempt to restart the heart, ventilator support/artificial breathing, electric shock, artificial nutrition, health care proxy, Molst form all discussed with pt. Pt wishes to consider. More than fifteen minutes spent on discussing advanced directives.           Sergey Winchester DO [facility]  Cardiovascular Medicine  [address], [address]  Office [phone]  Cell [phone]

## 2022-09-12 NOTE — PROGRESS NOTE ADULT - ASSESSMENT
Assessment  DMT2: 82y Female with DM T2 with hyperglycemia, A1C 9.4%, was on oral meds at home, now on basal bolus insulin, blood sugars are trending within overall acceptable range, no hypoglycemic episode. Patient appears comfortable in NAD, eating meals, daughter at bedside.  CHF: on medications, no chest pain, stable, monitored.  HTN: Controlled,  on antihypertensive medications.  CKD: Monitor labs/BMP,   Obesity: No strict exercise routines, not on any weight loss program, neither on low calorie diet.        Dior Monroy MD  Cell: [phone]  Office: [phone]     Assessment  DMT2: 82y Female with DM T2 with hyperglycemia, A1C 9.4%, was on oral meds at home, now on basal bolus insulin, blood  sugars are trending within overall acceptable range, no hypoglycemic episode. Patient appears comfortable in NAD, eating meals, daughter at bedside.  CHF: on medications, no chest pain, stable, monitored.  HTN: Controlled,  on antihypertensive medications.  CKD: Monitor labs/BMP,   Obesity: No strict exercise routines, not on any weight loss program, neither on low calorie diet.        Dior Monroy MD  Cell: [phone]  Office: [phone]

## 2022-09-12 NOTE — GOALS OF CARE CONVERSATION - ADVANCED CARE PLANNING - WHAT MATTERS MOST
Patient's son and  have been previously intubated. Does not want to go through those procedures herself.

## 2022-09-12 NOTE — PROGRESS NOTE ADULT - PROBLEM SELECTOR PLAN 2
A1c on Aug 23, 2022 (outpatient records) was 9.6    -Continue gabapentin for neuropathy (pt takes ER, but while inpatient continue 200 TID)  -Continue Insulin Lantus 16u HS, Lispro 7u TIDwm  -Monitor fingerstick glu checks, goal 100-180  -ISS  -Nutritionist eval  >     >For discharge: DM home regimen per Endo team:  -Prandin 1mg TID before meals -Decrease Januvia to 25mg daily -Must discontinue glimepiride  -F/U outpatient in 4 weeks

## 2022-09-12 NOTE — PROGRESS NOTE ADULT - SUBJECTIVE AND OBJECTIVE BOX
Chief complaint  Patient is a 82y old  Female who presents with a chief complaint of chf (11 Sep 2022 12:42)   Review of systems  Patient awake in chair, looks comfortable, no hypoglycemic episodes. Daughter at bedside.    Labs and Fingersticks  CAPILLARY BLOOD GLUCOSE      POCT Blood Glucose.: 179 mg/dL (12 Sep 2022 11:41)  POCT Blood Glucose.: 169 mg/dL (12 Sep 2022 07:43)  POCT Blood Glucose.: 163 mg/dL (11 Sep 2022 21:31)  POCT Blood Glucose.: 133 mg/dL (11 Sep 2022 16:58)      Anion Gap, Serum: 12 (09-12 @ 07:22)  Anion Gap, Serum: 13 (09-11 @ 06:47)      Calcium, Total Serum: 9.4 (09-12 @ 07:22)  Calcium, Total Serum: 9.1 (09-11 @ 06:47)          09-12    139  |  100  |  50<H>  ----------------------------<  177<H>  4.4   |  27  |  2.01<H>    Ca    9.4      12 Sep 2022 07:22  Phos  3.7     09-12  Mg     1.9     09-12                          12.8   8.11  )-----------( 266      ( 11 Sep 2022 06:47 )             41.5     Medications  MEDICATIONS  (STANDING):  amLODIPine   Tablet 5 milliGRAM(s) Oral daily  apixaban 2.5 milliGRAM(s) Oral two times a day  aspirin  chewable 81 milliGRAM(s) Oral daily  atorvastatin 40 milliGRAM(s) Oral at bedtime  buMETAnide Injectable 1 milliGRAM(s) IV Push every 12 hours  dextrose 5%. 1000 milliLiter(s) (50 mL/Hr) IV Continuous <Continuous>  dextrose 5%. 1000 milliLiter(s) (100 mL/Hr) IV Continuous <Continuous>  dextrose 50% Injectable 25 Gram(s) IV Push once  dextrose 50% Injectable 12.5 Gram(s) IV Push once  dextrose 50% Injectable 25 Gram(s) IV Push once  ertapenem  IVPB      famotidine    Tablet 20 milliGRAM(s) Oral daily  gabapentin 200 milliGRAM(s) Oral three times a day  glucagon  Injectable 1 milliGRAM(s) IntraMuscular once  insulin glargine Injectable (LANTUS) 16 Unit(s) SubCutaneous at bedtime  insulin lispro (ADMELOG) corrective regimen sliding scale   SubCutaneous three times a day before meals  insulin lispro (ADMELOG) corrective regimen sliding scale   SubCutaneous at bedtime  insulin lispro Injectable (ADMELOG) 7 Unit(s) SubCutaneous three times a day before meals  lidocaine   4% Patch 1 Patch Transdermal every 24 hours  metoprolol succinate  milliGRAM(s) Oral daily  polyethylene glycol 3350 17 Gram(s) Oral daily  spironolactone 12.5 milliGRAM(s) Oral daily      Physical Exam  General: Patient comfortable in bed  Vital Signs Last 12 Hrs  T(F): 97.9 (09-12-22 @ 09:17), Max: 97.9 (09-12-22 @ 09:17)  HR: 66 (09-12-22 @ 09:17) (66 - 66)  BP: 134/55 (09-12-22 @ 09:17) (134/55 - 134/55)  BP(mean): --  RR: 18 (09-12-22 @ 09:17) (18 - 18)  SpO2: 93% (09-12-22 @ 09:17) (93% - 93%)  Neck: No palpable thyroid nodules.  CVS: S1S2, No murmurs  Respiratory: No wheezing, no crepitations  GI: Abdomen soft, bowel sounds positive  Musculoskeletal:  edema lower extremities.   Skin: No skin rashes, no ecchymosis    Diagnostics             Chief complaint  Patient is a 82y old  Female who presents with a chief complaint of chf (11 Sep 2022 12:42)   Review of systems  Patient awake in chair, looks comfortable, no hypoglycemic episodes. Daughter at bedside.    Labs and Fingersticks  CAPILLARY BLOOD GLUCOSE      POCT Blood Glucose.: 179 mg/dL (12 Sep 2022 11:41)  POCT Blood Glucose.: 169 mg/dL (12 Sep 2022 07:43)  POCT Blood Glucose.: 163 mg/dL (11 Sep 2022 21:31)  POCT Blood Glucose.: 133 mg/dL (11 Sep 2022 16:58)      Anion Gap, Serum: 12 (09-12 @ 07:22)  Anion Gap, Serum: 13 (09-11 @ 06:47)      Calcium, Total Serum: 9.4 (09-12 @ 07:22)  Calcium, Total Serum: 9.1 (09-11 @ 06:47)          09-12    139  |  100  |  50<H>  ----------------------------<  177<H>  4.4   |  27  |  2.01<H>    Ca    9.4      12 Sep 2022 07:22  Phos  3.7     09-12  Mg     1.9     09-12                          12.8   8.11  )-----------( 266      ( 11 Sep 2022 06:47 )             41.5     Medications  MEDICATIONS  (STANDING):  amLODIPine   Tablet 5 milliGRAM(s) Oral daily  apixaban 2.5 milliGRAM(s) Oral two times a day  aspirin  chewable 81 milliGRAM(s) Oral daily  atorvastatin 40 milliGRAM(s) Oral at bedtime  buMETAnide Injectable 1 milliGRAM(s) IV Push every 12 hours  dextrose 5%. 1000 milliLiter(s) (50 mL/Hr) IV Continuous <Continuous>  dextrose 5%. 1000 milliLiter(s) (100 mL/Hr) IV Continuous <Continuous>  dextrose 50% Injectable 25 Gram(s) IV Push once  dextrose 50% Injectable 12.5 Gram(s) IV Push once  dextrose 50% Injectable 25 Gram(s) IV Push once  ertapenem  IVPB      famotidine    Tablet 20 milliGRAM(s) Oral daily  gabapentin 200 milliGRAM(s) Oral three times a day  glucagon  Injectable 1 milliGRAM(s) IntraMuscular once  insulin glargine Injectable (LANTUS) 16 Unit(s) SubCutaneous at bedtime  insulin lispro (ADMELOG) corrective regimen sliding scale   SubCutaneous three times a day before meals  insulin lispro (ADMELOG) corrective regimen sliding scale   SubCutaneous at bedtime  insulin lispro Injectable (ADMELOG) 7 Unit(s) SubCutaneous three times a day before meals  lidocaine   4% Patch 1 Patch Transdermal every 24 hours  metoprolol succinate  milliGRAM(s) Oral daily  polyethylene glycol 3350 17 Gram(s) Oral daily  spironolactone 12.5 milliGRAM(s) Oral daily      Physical Exam  General: Patient comfortable in bed  Vital Signs Last 12 Hrs  T(F): 97.9 (09-12-22 @ 09:17), Max: 97.9 (09-12-22 @ 09:17)  HR: 66 (09-12-22 @ 09:17) (66 - 66)  BP: 134/55 (09-12-22 @ 09:17) (134/55 - 134/55)  BP(mean): --  RR: 18 (09-12-22 @ 09:17) (18 - 18)  SpO2: 93% (09-12-22 @ 09:17) (93% - 93%)  Neck: No palpable thyroid nodules.  CVS: S1S2, No murmurs  Respiratory: No wheezing, no crepitations  GI: Abdomen soft, bowel sounds positive  Musculoskeletal:  edema lower extremities.   Skin: No skin rashes, no ecchymosis    Diagnostics

## 2022-09-12 NOTE — PROGRESS NOTE ADULT - SUBJECTIVE AND OBJECTIVE BOX
Overnight events noted  seen and examined at  the bedside ,still comfortably in chair   saturating well at RA      VITAL:  T(C): , Max: 36.6 (09-11-22 @ 17:59)  T(F): , Max: 97.9 (09-12-22 @ 09:17)  HR: 66 (09-12-22 @ 09:17)  BP: 134/55 (09-12-22 @ 09:17)  BP(mean): --  RR: 18 (09-12-22 @ 09:17)  SpO2: 93% (09-12-22 @ 09:17)  Wt(kg): --      PHYSICAL EXAM:  General: Alerted, oriented  HEENT: MMM  Lungs:CTA-b/l  Heart: RRR S1S2  Abdomen: Soft, NTND  Ext:  trace edema b/l  : no chen      LABS:                        12.8   8.11  )-----------( 266      ( 11 Sep 2022 06:47 )             41.5     Na(139)/K(4.4)/Cl(100)/HCO3(27)/BUN(50)/Cr(2.01)Glu(177)/Ca(9.4)/Mg(1.9)/PO4(3.7)    09-12 @ 07:22  Na(140)/K(3.8)/Cl(101)/HCO3(26)/BUN(45)/Cr(1.89)Glu(153)/Ca(9.1)/Mg(--)/PO4(--)    09-11 @ 06:47  Na(137)/K(5.1)/Cl(98)/HCO3(26)/BUN(43)/Cr(1.83)Glu(161)/Ca(9.3)/Mg(--)/PO4(--)    09-10 @ 11:53

## 2022-09-12 NOTE — GOALS OF CARE CONVERSATION - ADVANCED CARE PLANNING - CONVERSATION DETAILS
Spoke to patient regarding advance directives, followed up with patient's daughter since  [phone] was unable to complete conversation. Provided patient health care proxy form. Patient has Patient expressed that she has previously completed health care proxy form in Living Will, listed daughter Zena as HCP and son as alternate (son has since ). Informed family to choose a different alternate if interested, new HCP will override old HCP. Daughter will produce Living Will if requested.     Provided MOLST. Explained MOLST decisions, including code status, goals of care. Patient and family will follow up with medical team to complete MOLST. Explained that until MOLST is complete, patient will remain Full Code. Patient and family acknowledged understanding of Full Code and agreed. Patient expressed she would like to be DNI, was unsure of DNR wishes. Explained pt cannot be only DNI without being DNR. Explained TLT. Patient will discuss with family and consider before making any determinations. Informed NP Nadeige to follow up.     Provided video information regarding advance directives, as well as contact information. Will remain available for questions.

## 2022-09-12 NOTE — PROGRESS NOTE ADULT - PROBLEM SELECTOR PLAN 6
diet: CC + DASH  dvt ppx: hx of dvt, apixaban 2.5 BID  Bowel regimen: miralax qd. monitor for constipation     dispo: pending clinical improvement

## 2022-09-12 NOTE — PROGRESS NOTE ADULT - ASSESSMENT
ASSESSMENT:  83 yo F wit a PMH of breast CA, DM, HTN, HLD, CHF, chronic RUE lymphedema p/w substernal/epigastric pain, admitted for HF  , nephrology consulted for CKD.    -CKD stage 3/4---- base line creatinine 1.5 mg/dl  -Dae -non oliguric ---worsening on  diuretics  -Lytes controlled  -HTN- blood pressure soft  -CHF ---volume overload   -acid base----monitor bicarb while on diuretics  -UTI---on abx  -lymphedema      RECOMMENDATION:  -worsening creatinine  -C/W Bumex 1mg IV Q 12 hrs  -start spironolactone 12.5 mg daily   -monitor bmp   -Avoid nephrotoxins as able  -Dose meds for GFR 30 ml/min  -monitor i/O  -daily weight.  -urology rec appreciated       Thank you for involving [facility] in this patient's care.    With warm regards    Brenda Rader  [facility]  Office: [phone]

## 2022-09-12 NOTE — PROGRESS NOTE ADULT - PROBLEM SELECTOR PLAN 4
GFR 37%  -patient follows [facility] outpatient, consulted [facility]    -sCr worsening in the setting of diuretics   -Diuretics per nephrology recs  -strict Is/Os. Check daily BMP  -avoid nephrotoxic agents  -renally dose all medications    #Hypokalemia: Give K supplement to maintain K >4.

## 2022-09-12 NOTE — PROGRESS NOTE ADULT - PROBLEM SELECTOR PLAN 3
CT showing "Suggestion of chronic and severe right-sided hydronephrosis as on 11/3/2020 CT chest"  -May be a source of worsening CKD  -Renal u/s with chronic severe R sided hydronephrosis  -Urology team re- consulted regarding patient's complaint of "kidney pain"  -Check renal Lasix scan to evaluate function of R kidney   -Monitor bladder scans for PVR      #UTI: patient complaining of urinary symptoms and UA + with urine culture grew e coli >100k,  now sensitivities showing ESBL   -Started Ceftriaxone  (9/10 -  ), Switch to Ertapenem

## 2022-09-13 LAB
ANION GAP SERPL CALC-SCNC: 13 MMOL/L — SIGNIFICANT CHANGE UP (ref 5–17)
BUN SERPL-MCNC: 52 MG/DL — HIGH (ref 7–23)
CALCIUM SERPL-MCNC: 9.3 MG/DL — SIGNIFICANT CHANGE UP (ref 8.4–10.5)
CHLORIDE SERPL-SCNC: 98 MMOL/L — SIGNIFICANT CHANGE UP (ref 96–108)
CO2 SERPL-SCNC: 27 MMOL/L — SIGNIFICANT CHANGE UP (ref 22–31)
CREAT SERPL-MCNC: 1.85 MG/DL — HIGH (ref 0.5–1.3)
EGFR: 27 ML/MIN/1.73M2 — LOW
GLUCOSE BLDC GLUCOMTR-MCNC: 193 MG/DL — HIGH (ref 70–99)
GLUCOSE BLDC GLUCOMTR-MCNC: 197 MG/DL — HIGH (ref 70–99)
GLUCOSE BLDC GLUCOMTR-MCNC: 211 MG/DL — HIGH (ref 70–99)
GLUCOSE BLDC GLUCOMTR-MCNC: 212 MG/DL — HIGH (ref 70–99)
GLUCOSE SERPL-MCNC: 200 MG/DL — HIGH (ref 70–99)
HCT VFR BLD CALC: 42.9 % — SIGNIFICANT CHANGE UP (ref 34.5–45)
HGB BLD-MCNC: 13 G/DL — SIGNIFICANT CHANGE UP (ref 11.5–15.5)
MCHC RBC-ENTMCNC: 28.9 PG — SIGNIFICANT CHANGE UP (ref 27–34)
MCHC RBC-ENTMCNC: 30.3 GM/DL — LOW (ref 32–36)
MCV RBC AUTO: 95.3 FL — SIGNIFICANT CHANGE UP (ref 80–100)
NRBC # BLD: 0 /100 WBCS — SIGNIFICANT CHANGE UP (ref 0–0)
NT-PROBNP SERPL-SCNC: 276 PG/ML — SIGNIFICANT CHANGE UP (ref 0–300)
PLATELET # BLD AUTO: 270 K/UL — SIGNIFICANT CHANGE UP (ref 150–400)
POTASSIUM SERPL-MCNC: 4.4 MMOL/L — SIGNIFICANT CHANGE UP (ref 3.5–5.3)
POTASSIUM SERPL-SCNC: 4.4 MMOL/L — SIGNIFICANT CHANGE UP (ref 3.5–5.3)
RBC # BLD: 4.5 M/UL — SIGNIFICANT CHANGE UP (ref 3.8–5.2)
RBC # FLD: 15.2 % — HIGH (ref 10.3–14.5)
SODIUM SERPL-SCNC: 138 MMOL/L — SIGNIFICANT CHANGE UP (ref 135–145)
WBC # BLD: 8.86 K/UL — SIGNIFICANT CHANGE UP (ref 3.8–10.5)
WBC # FLD AUTO: 8.86 K/UL — SIGNIFICANT CHANGE UP (ref 3.8–10.5)

## 2022-09-13 PROCEDURE — 99233 SBSQ HOSP IP/OBS HIGH 50: CPT

## 2022-09-13 PROCEDURE — 78708 K FLOW/FUNCT IMAGE W/DRUG: CPT | Mod: 26

## 2022-09-13 PROCEDURE — 71046 X-RAY EXAM CHEST 2 VIEWS: CPT | Mod: 26

## 2022-09-13 RX ORDER — ACETAMINOPHEN 500 MG
650 TABLET ORAL ONCE
Refills: 0 | Status: COMPLETED | OUTPATIENT
Start: 2022-09-13 | End: 2022-09-13

## 2022-09-13 RX ORDER — SODIUM CHLORIDE 9 MG/ML
850 INJECTION INTRAMUSCULAR; INTRAVENOUS; SUBCUTANEOUS ONCE
Refills: 0 | Status: COMPLETED | OUTPATIENT
Start: 2022-09-13 | End: 2022-09-13

## 2022-09-13 RX ORDER — INSULIN LISPRO 100/ML
8 VIAL (ML) SUBCUTANEOUS
Refills: 0 | Status: DISCONTINUED | OUTPATIENT
Start: 2022-09-13 | End: 2022-09-14

## 2022-09-13 RX ORDER — BUMETANIDE 0.25 MG/ML
1 INJECTION INTRAMUSCULAR; INTRAVENOUS
Refills: 0 | Status: DISCONTINUED | OUTPATIENT
Start: 2022-09-13 | End: 2022-09-16

## 2022-09-13 RX ORDER — BACITRACIN ZINC 500 UNIT/G
1 OINTMENT IN PACKET (EA) TOPICAL
Refills: 0 | Status: DISCONTINUED | OUTPATIENT
Start: 2022-09-13 | End: 2022-09-16

## 2022-09-13 RX ORDER — INSULIN GLARGINE 100 [IU]/ML
18 INJECTION, SOLUTION SUBCUTANEOUS AT BEDTIME
Refills: 0 | Status: DISCONTINUED | OUTPATIENT
Start: 2022-09-13 | End: 2022-09-14

## 2022-09-13 RX ADMIN — INSULIN GLARGINE 18 UNIT(S): 100 INJECTION, SOLUTION SUBCUTANEOUS at 22:17

## 2022-09-13 RX ADMIN — APIXABAN 2.5 MILLIGRAM(S): 2.5 TABLET, FILM COATED ORAL at 17:10

## 2022-09-13 RX ADMIN — FAMOTIDINE 20 MILLIGRAM(S): 10 INJECTION INTRAVENOUS at 12:59

## 2022-09-13 RX ADMIN — Medication 7 UNIT(S): at 08:21

## 2022-09-13 RX ADMIN — Medication 0: at 22:18

## 2022-09-13 RX ADMIN — LIDOCAINE 1 PATCH: 4 CREAM TOPICAL at 18:08

## 2022-09-13 RX ADMIN — SPIRONOLACTONE 12.5 MILLIGRAM(S): 25 TABLET, FILM COATED ORAL at 05:30

## 2022-09-13 RX ADMIN — LIDOCAINE 1 PATCH: 4 CREAM TOPICAL at 17:10

## 2022-09-13 RX ADMIN — SODIUM CHLORIDE 850 MILLILITER(S): 9 INJECTION INTRAMUSCULAR; INTRAVENOUS; SUBCUTANEOUS at 10:40

## 2022-09-13 RX ADMIN — GABAPENTIN 200 MILLIGRAM(S): 400 CAPSULE ORAL at 05:29

## 2022-09-13 RX ADMIN — Medication 81 MILLIGRAM(S): at 12:58

## 2022-09-13 RX ADMIN — POLYETHYLENE GLYCOL 3350 17 GRAM(S): 17 POWDER, FOR SOLUTION ORAL at 12:59

## 2022-09-13 RX ADMIN — Medication 1 APPLICATION(S): at 17:19

## 2022-09-13 RX ADMIN — AMLODIPINE BESYLATE 5 MILLIGRAM(S): 2.5 TABLET ORAL at 05:30

## 2022-09-13 RX ADMIN — BUMETANIDE 1 MILLIGRAM(S): 0.25 INJECTION INTRAMUSCULAR; INTRAVENOUS at 17:15

## 2022-09-13 RX ADMIN — GABAPENTIN 200 MILLIGRAM(S): 400 CAPSULE ORAL at 22:18

## 2022-09-13 RX ADMIN — Medication 8 UNIT(S): at 17:12

## 2022-09-13 RX ADMIN — BUMETANIDE 1 MILLIGRAM(S): 0.25 INJECTION INTRAMUSCULAR; INTRAVENOUS at 05:30

## 2022-09-13 RX ADMIN — Medication 650 MILLIGRAM(S): at 23:36

## 2022-09-13 RX ADMIN — Medication 150 MILLIGRAM(S): at 05:30

## 2022-09-13 RX ADMIN — Medication 650 MILLIGRAM(S): at 23:17

## 2022-09-13 RX ADMIN — ATORVASTATIN CALCIUM 40 MILLIGRAM(S): 80 TABLET, FILM COATED ORAL at 22:18

## 2022-09-13 RX ADMIN — GABAPENTIN 200 MILLIGRAM(S): 400 CAPSULE ORAL at 17:06

## 2022-09-13 RX ADMIN — APIXABAN 2.5 MILLIGRAM(S): 2.5 TABLET, FILM COATED ORAL at 05:30

## 2022-09-13 RX ADMIN — Medication 1: at 08:21

## 2022-09-13 RX ADMIN — ERTAPENEM SODIUM 100 MILLIGRAM(S): 1 INJECTION, POWDER, LYOPHILIZED, FOR SOLUTION INTRAMUSCULAR; INTRAVENOUS at 09:48

## 2022-09-13 RX ADMIN — Medication 2: at 17:03

## 2022-09-13 NOTE — PROGRESS NOTE ADULT - SUBJECTIVE AND OBJECTIVE BOX
[facility] Division of Hospital Medicine  Margy Almaraz MD M-F, 8A-5P: MS Teams, Pager: [phone]  Other Times: Ext: [phone], Pager: [phone]      Patient is a 82y old  Female who presents with a chief complaint of CHF (12 Sep 2022 17:19)      SUBJECTIVE / OVERNIGHT EVENTS:  Patient was examined this morning. She is reporting that her back pain/kidney pain has improved. Intermittent dyspnea is also improving. She is sitting in chair comfortably. Rest of the 10 point ROS negative.       ADDITIONAL REVIEW OF SYSTEMS:    MEDICATIONS  (STANDING):  amLODIPine   Tablet 5 milliGRAM(s) Oral daily  apixaban 2.5 milliGRAM(s) Oral two times a day  aspirin  chewable 81 milliGRAM(s) Oral daily  atorvastatin 40 milliGRAM(s) Oral at bedtime  BACItracin   Ointment 1 Application(s) Topical two times a day  buMETAnide 1 milliGRAM(s) Oral two times a day  dextrose 5%. 1000 milliLiter(s) (50 mL/Hr) IV Continuous <Continuous>  dextrose 5%. 1000 milliLiter(s) (100 mL/Hr) IV Continuous <Continuous>  dextrose 50% Injectable 25 Gram(s) IV Push once  dextrose 50% Injectable 12.5 Gram(s) IV Push once  dextrose 50% Injectable 25 Gram(s) IV Push once  ertapenem  IVPB 500 milliGRAM(s) IV Intermittent every 24 hours  ertapenem  IVPB      famotidine    Tablet 20 milliGRAM(s) Oral daily  gabapentin 200 milliGRAM(s) Oral three times a day  glucagon  Injectable 1 milliGRAM(s) IntraMuscular once  insulin glargine Injectable (LANTUS) 18 Unit(s) SubCutaneous at bedtime  insulin lispro (ADMELOG) corrective regimen sliding scale   SubCutaneous three times a day before meals  insulin lispro (ADMELOG) corrective regimen sliding scale   SubCutaneous at bedtime  insulin lispro Injectable (ADMELOG) 8 Unit(s) SubCutaneous three times a day before meals  lidocaine   4% Patch 1 Patch Transdermal every 24 hours  metoprolol succinate  milliGRAM(s) Oral daily  polyethylene glycol 3350 17 Gram(s) Oral daily  spironolactone 12.5 milliGRAM(s) Oral daily    MEDICATIONS  (PRN):  dextrose Oral Gel 15 Gram(s) Oral once PRN Blood Glucose LESS THAN 70 milliGRAM(s)/deciliter  melatonin 3 milliGRAM(s) Oral at bedtime PRN Insomnia      CAPILLARY BLOOD GLUCOSE      POCT Blood Glucose.: 193 mg/dL (13 Sep 2022 11:58)  POCT Blood Glucose.: 197 mg/dL (13 Sep 2022 07:50)  POCT Blood Glucose.: 175 mg/dL (12 Sep 2022 21:42)  POCT Blood Glucose.: 168 mg/dL (12 Sep 2022 16:48)      I&O's Summary    12 Sep 2022 07:01  -  13 Sep 2022 07:00  --------------------------------------------------------  IN: 400 mL / OUT: 1800 mL / NET: -1400 mL        Daily     Daily     PHYSICAL EXAM:  Vital Signs Last 24 Hrs  T(C): 36.3 (13 Sep 2022 09:19), Max: 36.5 (12 Sep 2022 16:25)  T(F): 97.3 (13 Sep 2022 09:19), Max: 97.7 (12 Sep 2022 16:25)  HR: 65 (13 Sep 2022 09:19) (65 - 68)  BP: 125/74 (13 Sep 2022 09:19) (114/52 - 133/83)  BP(mean): --  RR: 18 (13 Sep 2022 09:19) (17 - 18)  SpO2: 96% (13 Sep 2022 09:19) (93% - 96%)    Parameters below as of 13 Sep 2022 09:19  Patient On (Oxygen Delivery Method): room air      CONSTITUTIONAL: NAD, well-developed  EYES: PERRLA; conjunctiva and sclera clear  ENMT: Moist oral mucosa,  NECK: Supple,  RESPIRATORY: Normal respiratory effort; lungs are clear to auscultation bilaterally  CARDIOVASCULAR: Regular rate and rhythm, normal S1 and S2, no murmur/rub/gallop;   Mild pedal edema; +Right UE edema - chronic per patient. Peripheral pulses are 2+ bilaterally.   ABDOMEN: Nontender to palpation, normoactive bowel sounds, no rebound/guarding;   BACK: no back tenderness on exam  MUSCULOSKELETAL:  no clubbing or cyanosis of digits; no joint swelling or tenderness to palpation, moving all extremities   PSYCH: A+O to person, place, and time; affect appropriate  SKIN: chronic LE venous stasis ulcerations       LABS:                        13.0   8.86  )-----------( 270      ( 13 Sep 2022 07:28 )             42.9     09-13    138  |  98  |  52<H>  ----------------------------<  200<H>  4.4   |  27  |  1.85<H>    Ca    9.3      13 Sep 2022 07:28  Phos  3.7     09-12  Mg     1.9     09-12                  SARS-CoV-2: NotDetec (07 Sep 2022 19:04)  COVID-19 PCR: NotDetec (07 Sep 2022 12:16)      RADIOLOGY & ADDITIONAL TESTS:  Results Reviewed:   Imaging Personally Reviewed:  Electrocardiogram Personally Reviewed:    COORDINATION OF CARE:  Care Discussed with Consultants/Other Providers [Y/N]:  Prior or Outpatient Records Reviewed [Y/N]:

## 2022-09-13 NOTE — PROGRESS NOTE ADULT - SUBJECTIVE AND OBJECTIVE BOX
Chief complaint  Patient is a 82y old  Female who presents with a chief complaint of CHF (12 Sep 2022 17:19)   Review of systems  Patient in bed, looks comfortable, no hypoglycemic episodes.    Labs and Fingersticks  CAPILLARY BLOOD GLUCOSE      POCT Blood Glucose.: 193 mg/dL (13 Sep 2022 11:58)  POCT Blood Glucose.: 197 mg/dL (13 Sep 2022 07:50)  POCT Blood Glucose.: 175 mg/dL (12 Sep 2022 21:42)  POCT Blood Glucose.: 168 mg/dL (12 Sep 2022 16:48)      Anion Gap, Serum: 13 (09-13 @ 07:28)  Anion Gap, Serum: 12 (09-12 @ 07:22)      Calcium, Total Serum: 9.3 (09-13 @ 07:28)  Calcium, Total Serum: 9.4 (09-12 @ 07:22)          09-13    138  |  98  |  52<H>  ----------------------------<  200<H>  4.4   |  27  |  1.85<H>    Ca    9.3      13 Sep 2022 07:28  Phos  3.7     09-12  Mg     1.9     09-12                          13.0   8.86  )-----------( 270      ( 13 Sep 2022 07:28 )             42.9     Medications  MEDICATIONS  (STANDING):  amLODIPine   Tablet 5 milliGRAM(s) Oral daily  apixaban 2.5 milliGRAM(s) Oral two times a day  aspirin  chewable 81 milliGRAM(s) Oral daily  atorvastatin 40 milliGRAM(s) Oral at bedtime  BACItracin   Ointment 1 Application(s) Topical two times a day  buMETAnide 1 milliGRAM(s) Oral two times a day  dextrose 5%. 1000 milliLiter(s) (50 mL/Hr) IV Continuous <Continuous>  dextrose 5%. 1000 milliLiter(s) (100 mL/Hr) IV Continuous <Continuous>  dextrose 50% Injectable 25 Gram(s) IV Push once  dextrose 50% Injectable 12.5 Gram(s) IV Push once  dextrose 50% Injectable 25 Gram(s) IV Push once  ertapenem  IVPB 500 milliGRAM(s) IV Intermittent every 24 hours  ertapenem  IVPB      famotidine    Tablet 20 milliGRAM(s) Oral daily  gabapentin 200 milliGRAM(s) Oral three times a day  glucagon  Injectable 1 milliGRAM(s) IntraMuscular once  insulin glargine Injectable (LANTUS) 18 Unit(s) SubCutaneous at bedtime  insulin lispro (ADMELOG) corrective regimen sliding scale   SubCutaneous three times a day before meals  insulin lispro (ADMELOG) corrective regimen sliding scale   SubCutaneous at bedtime  insulin lispro Injectable (ADMELOG) 8 Unit(s) SubCutaneous three times a day before meals  lidocaine   4% Patch 1 Patch Transdermal every 24 hours  metoprolol succinate  milliGRAM(s) Oral daily  polyethylene glycol 3350 17 Gram(s) Oral daily  spironolactone 12.5 milliGRAM(s) Oral daily      Physical Exam  General: Patient comfortable in bed  Vital Signs Last 12 Hrs  T(F): 97.3 (09-13-22 @ 09:19), Max: 97.3 (09-13-22 @ 09:19)  HR: 65 (09-13-22 @ 09:19) (65 - 66)  BP: 125/74 (09-13-22 @ 09:19) (125/74 - 132/56)  BP(mean): --  RR: 18 (09-13-22 @ 09:19) (17 - 18)  SpO2: 96% (09-13-22 @ 09:19) (95% - 96%)  Neck: No palpable thyroid nodules.  CVS: S1S2, No murmurs  Respiratory: No wheezing, no crepitations  GI: Abdomen soft, bowel sounds positive  Musculoskeletal:  edema lower extremities.   Skin: No skin rashes, no ecchymosis    Diagnostics             Chief complaint  Patient is a 82y old  Female who presents with a chief complaint of CHF (12 Sep 2022 17:19)   Review of systems  Patient in bed, looks comfortable, no hypoglycemic episodes.    Labs and Fingersticks  CAPILLARY BLOOD GLUCOSE      POCT Blood Glucose.: 193 mg/dL (13 Sep 2022 11:58)  POCT Blood Glucose.: 197 mg/dL (13 Sep 2022 07:50)  POCT Blood Glucose.: 175 mg/dL (12 Sep 2022 21:42)  POCT Blood Glucose.: 168 mg/dL (12 Sep 2022 16:48)      Anion Gap, Serum: 13 (09-13 @ 07:28)  Anion Gap, Serum: 12 (09-12 @ 07:22)      Calcium, Total Serum: 9.3 (09-13 @ 07:28)  Calcium, Total Serum: 9.4 (09-12 @ 07:22)          09-13    138  |  98  |  52<H>  ----------------------------<  200<H>  4.4   |  27  |  1.85<H>    Ca    9.3      13 Sep 2022 07:28  Phos  3.7     09-12  Mg     1.9     09-12                          13.0   8.86  )-----------( 270      ( 13 Sep 2022 07:28 )             42.9     Medications  MEDICATIONS  (STANDING):  amLODIPine   Tablet 5 milliGRAM(s) Oral daily  apixaban 2.5 milliGRAM(s) Oral two times a day  aspirin  chewable 81 milliGRAM(s) Oral daily  atorvastatin 40 milliGRAM(s) Oral at bedtime  BACItracin   Ointment 1 Application(s) Topical two times a day  buMETAnide 1 milliGRAM(s) Oral two times a day  dextrose 5%. 1000 milliLiter(s) (50 mL/Hr) IV Continuous <Continuous>  dextrose 5%. 1000 milliLiter(s) (100 mL/Hr) IV Continuous <Continuous>  dextrose 50% Injectable 25 Gram(s) IV Push once  dextrose 50% Injectable 12.5 Gram(s) IV Push once  dextrose 50% Injectable 25 Gram(s) IV Push once  ertapenem  IVPB 500 milliGRAM(s) IV Intermittent every 24 hours  ertapenem  IVPB      famotidine    Tablet 20 milliGRAM(s) Oral daily  gabapentin 200 milliGRAM(s) Oral three times a day  glucagon  Injectable 1 milliGRAM(s) IntraMuscular once  insulin glargine Injectable (LANTUS) 18 Unit(s) SubCutaneous at bedtime  insulin lispro (ADMELOG) corrective regimen sliding scale   SubCutaneous three times a day before meals  insulin lispro (ADMELOG) corrective regimen sliding scale   SubCutaneous at bedtime  insulin lispro Injectable (ADMELOG) 8 Unit(s) SubCutaneous three times a day before meals  lidocaine   4% Patch 1 Patch Transdermal every 24 hours  metoprolol succinate  milliGRAM(s) Oral daily  polyethylene glycol 3350 17 Gram(s) Oral daily  spironolactone 12.5 milliGRAM(s) Oral daily      Physical Exam  General: Patient comfortable in bed  Vital Signs Last 12 Hrs  T(F): 97.3 (09-13-22 @ 09:19), Max: 97.3 (09-13-22 @ 09:19)  HR: 65 (09-13-22 @ 09:19) (65 - 66)  BP: 125/74 (09-13-22 @ 09:19) (125/74 - 132/56)  BP(mean): --  RR: 18 (09-13-22 @ 09:19) (17 - 18)  SpO2: 96% (09-13-22 @ 09:19) (95% - 96%)  Neck: No palpable thyroid nodules.  CVS: S1S2, No murmurs  Respiratory: No wheezing, no crepitations  GI: Abdomen soft, bowel sounds positive  Musculoskeletal:  edema lower extremities.   Skin: No skin rashes, no ecchymosis    Diagnostics

## 2022-09-13 NOTE — PROGRESS NOTE ADULT - PROBLEM SELECTOR PLAN 3
CT showing "Suggestion of chronic and severe right-sided hydronephrosis as on 11/3/2020 CT chest"  -May be a source of worsening CKD  -Renal u/s with chronic severe R sided hydronephrosis  -Urology team re- consulted regarding patient's complaint of "kidney pain"  >      >F/U renal Lasix scan to evaluate function of R kidney -pending  -Monitor bladder scans for PVR    #UTI: patient complaining of urinary symptoms and UA + with urine culture grew e coli >100k,  now sensitivities showing ESBL   -Started Ceftriaxone  (9/10 -  ), Switched to Ertapenem 9/12

## 2022-09-13 NOTE — PROGRESS NOTE ADULT - SUBJECTIVE AND OBJECTIVE BOX
NEPHROLOGY     Patient seen and examined.    MEDICATIONS  (STANDING):  amLODIPine   Tablet 5 milliGRAM(s) Oral daily  apixaban 2.5 milliGRAM(s) Oral two times a day  aspirin  chewable 81 milliGRAM(s) Oral daily  atorvastatin 40 milliGRAM(s) Oral at bedtime  BACItracin   Ointment 1 Application(s) Topical two times a day  buMETAnide 1 milliGRAM(s) Oral two times a day  dextrose 5%. 1000 milliLiter(s) (50 mL/Hr) IV Continuous <Continuous>  dextrose 5%. 1000 milliLiter(s) (100 mL/Hr) IV Continuous <Continuous>  dextrose 50% Injectable 25 Gram(s) IV Push once  dextrose 50% Injectable 12.5 Gram(s) IV Push once  dextrose 50% Injectable 25 Gram(s) IV Push once  ertapenem  IVPB 500 milliGRAM(s) IV Intermittent every 24 hours  ertapenem  IVPB      famotidine    Tablet 20 milliGRAM(s) Oral daily  gabapentin 200 milliGRAM(s) Oral three times a day  glucagon  Injectable 1 milliGRAM(s) IntraMuscular once  insulin glargine Injectable (LANTUS) 18 Unit(s) SubCutaneous at bedtime  insulin lispro (ADMELOG) corrective regimen sliding scale   SubCutaneous three times a day before meals  insulin lispro (ADMELOG) corrective regimen sliding scale   SubCutaneous at bedtime  insulin lispro Injectable (ADMELOG) 8 Unit(s) SubCutaneous three times a day before meals  lidocaine   4% Patch 1 Patch Transdermal every 24 hours  metoprolol succinate  milliGRAM(s) Oral daily  polyethylene glycol 3350 17 Gram(s) Oral daily  spironolactone 12.5 milliGRAM(s) Oral daily    VITALS:  T(C): , Max: 36.5 (09-12-22 @ 16:25)  T(F): , Max: 97.7 (09-12-22 @ 16:25)  HR: 65 (09-13-22 @ 09:19)  BP: 125/74 (09-13-22 @ 09:19)  RR: 18 (09-13-22 @ 09:19)  SpO2: 96% (09-13-22 @ 09:19)    I and O's:    09-12 @ 07:01  -  09-13 @ 07:00  --------------------------------------------------------  IN: 400 mL / OUT: 1800 mL / NET: -1400 mL    PHYSICAL EXAM:    LABS:                        13.0   8.86  )-----------( 270      ( 13 Sep 2022 07:28 )             42.9     09-13    138  |  98  |  52<H>  ----------------------------<  200<H>  4.4   |  27  |  1.85<H>    Ca    9.3      13 Sep 2022 07:28  Phos  3.7     09-12  Mg     1.9     09-12   NEPHROLOGY     Patient seen and examined, reports feeling ok, no sob, comfortable on room air, currently in no acute distress.     MEDICATIONS  (STANDING):  amLODIPine   Tablet 5 milliGRAM(s) Oral daily  apixaban 2.5 milliGRAM(s) Oral two times a day  aspirin  chewable 81 milliGRAM(s) Oral daily  atorvastatin 40 milliGRAM(s) Oral at bedtime  BACItracin   Ointment 1 Application(s) Topical two times a day  buMETAnide 1 milliGRAM(s) Oral two times a day  dextrose 5%. 1000 milliLiter(s) (50 mL/Hr) IV Continuous <Continuous>  dextrose 5%. 1000 milliLiter(s) (100 mL/Hr) IV Continuous <Continuous>  dextrose 50% Injectable 25 Gram(s) IV Push once  dextrose 50% Injectable 12.5 Gram(s) IV Push once  dextrose 50% Injectable 25 Gram(s) IV Push once  ertapenem  IVPB 500 milliGRAM(s) IV Intermittent every 24 hours  ertapenem  IVPB      famotidine    Tablet 20 milliGRAM(s) Oral daily  gabapentin 200 milliGRAM(s) Oral three times a day  glucagon  Injectable 1 milliGRAM(s) IntraMuscular once  insulin glargine Injectable (LANTUS) 18 Unit(s) SubCutaneous at bedtime  insulin lispro (ADMELOG) corrective regimen sliding scale   SubCutaneous three times a day before meals  insulin lispro (ADMELOG) corrective regimen sliding scale   SubCutaneous at bedtime  insulin lispro Injectable (ADMELOG) 8 Unit(s) SubCutaneous three times a day before meals  lidocaine   4% Patch 1 Patch Transdermal every 24 hours  metoprolol succinate  milliGRAM(s) Oral daily  polyethylene glycol 3350 17 Gram(s) Oral daily  spironolactone 12.5 milliGRAM(s) Oral daily    VITALS:  T(C): , Max: 36.5 (09-12-22 @ 16:25)  T(F): , Max: 97.7 (09-12-22 @ 16:25)  HR: 65 (09-13-22 @ 09:19)  BP: 125/74 (09-13-22 @ 09:19)  RR: 18 (09-13-22 @ 09:19)  SpO2: 96% (09-13-22 @ 09:19)    I and O's:    09-12 @ 07:01 - 09-13 @ 07:00  --------------------------------------------------------  IN: 400 mL / OUT: 1800 mL / NET: -1400 mL    PHYSICAL EXAM:  General: Alerted, oriented  HEENT: MMM  Lungs: diminished at bases  Heart: RRR S1S2  Abdomen: Soft, NTND  Ext:  trace edema b/l  : no chen    LABS:                        13.0   8.86  )-----------( 270      ( 13 Sep 2022 07:28 )             42.9     09-13    138  |  98  |  52<H>  ----------------------------<  200<H>  4.4   |  27  |  1.85<H>    Ca    9.3      13 Sep 2022 07:28  Phos  3.7     09-12  Mg     1.9     09-12    RADIOLOGIC STUDIES:         ACC: [id number] EXAM:  NM KIDNEY IMG LASIX                          PROCEDURE DATE:  09/13/2022          INTERPRETATION:  RADIOPHARMACEUTICAL: 10.2 mCi   [id number] (MAG3), I.V.    CLINICAL STATEMENT: 82-year-old female with history of breast cancer   presents with chronic severe right hydronephrosis.    TECHNIQUE: Patient came with indwelling Chen catheter in place. Patient   was hydrated with 850 mL of normal saline solution intravenously. Dynamic   images of the posterior abdomen were obtained for 49 minutes following   administration of radiopharmaceutical.  Lasix 40 mg I.V. was administered   at 26 minutes post-injection.    FINDINGS:  The renal perfusion images delayed and diminished flow to the   left kidney and nodiscernible flow to the right renal bed.    The functional images show good cortical uptake of radiotracer by the   left kidney. There is prompt appearance of activity in the left   collecting system. There is spontaneous drainage of activity from the   left collecting system prior to Lasix. There is mild cortical retention   of activity at the end of the study suggestive of mildly impaired renal   function.    There is no evidence of a functioning right kidney.    IMPRESSION: Abnormal diuretic renal scan.    Mildly diminished flow to and function of the left kidney with no   evidence of obstruction.    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    --- End of Report --      FRITZ TEMPLE MD; Attending Nuclear Medicine  This document has been electronically signed. Sep 13 2022  3:42PM

## 2022-09-13 NOTE — PROGRESS NOTE ADULT - ASSESSMENT
Assessment  DMT2: 82y Female with DM T2 with hyperglycemia, A1C 9.4%, was on oral meds at home, now on basal bolus insulin, blood sugars are running slightly high and not at target, no hypoglycemic episodes, eating meals, appears comfortable, no acute events.  CHF: on medications, no chest pain, stable, monitored.  HTN: Controlled,  on antihypertensive medications.  CKD: Monitor labs/BMP,   Obesity: No strict exercise routines, not on any weight loss program, neither on low calorie diet.        Dior Monroy MD  Cell: [phone]  Office: [phone]     Assessment  DMT2: 82y Female with DM T2 with hyperglycemia, A1C 9.4%, was on oral meds at home, now on basal bolus insulin, blood sugars are  running slightly high and not at target, no hypoglycemic episodes, eating meals, appears comfortable, no acute events.  CHF: on medications, no chest pain, stable, monitored.  HTN: Controlled,  on antihypertensive medications.  CKD: Monitor labs/BMP,   Obesity: No strict exercise routines, not on any weight loss program, neither on low calorie diet.        Dior Monroy MD  Cell: [phone]  Office: [phone]

## 2022-09-13 NOTE — PROGRESS NOTE ADULT - SUBJECTIVE AND OBJECTIVE BOX
DATE OF SERVICE: 09-13-22 @ 15:21    Patient is a 82y old  Female who presents with a chief complaint of CHF (12 Sep 2022 17:19)      INTERVAL HISTORY: Feels ok.     REVIEW OF SYSTEMS:  CONSTITUTIONAL: No weakness  EYES/ENT: No visual changes;  No throat pain   NECK: No pain or stiffness  RESPIRATORY: No cough, wheezing; No shortness of breath  CARDIOVASCULAR: No chest pain or palpitations  GASTROINTESTINAL: No abdominal  pain. No nausea, vomiting, or hematemesis  GENITOURINARY: No dysuria, frequency or hematuria  NEUROLOGICAL: No stroke like symptoms  SKIN: No rashes      	  MEDICATIONS:  amLODIPine   Tablet 5 milliGRAM(s) Oral daily  buMETAnide 1 milliGRAM(s) Oral two times a day  metoprolol succinate  milliGRAM(s) Oral daily  spironolactone 12.5 milliGRAM(s) Oral daily        PHYSICAL EXAM:  T(C): 36.3 (09-13-22 @ 09:19), Max: 36.5 (09-12-22 @ 16:25)  HR: 65 (09-13-22 @ 09:19) (65 - 68)  BP: 125/74 (09-13-22 @ 09:19) (114/52 - 133/83)  RR: 18 (09-13-22 @ 09:19) (17 - 18)  SpO2: 96% (09-13-22 @ 09:19) (93% - 96%)  Wt(kg): --  I&O's Summary    12 Sep 2022 07:01  -  13 Sep 2022 07:00  --------------------------------------------------------  IN: 400 mL / OUT: 1800 mL / NET: -1400 mL          Appearance: In no distress	  HEENT:    PERRL, EOMI	  Cardiovascular:  S1 S2, No JVD  Respiratory: Lungs clear to auscultation	  Gastrointestinal:  Soft, Non-tender, + BS	  Vascularature:  No edema of LE  Psychiatric: Appropriate affect   Neuro: no acute focal deficits                               13.0   8.86  )-----------( 270      ( 13 Sep 2022 07:28 )             42.9     09-13    138  |  98  |  52<H>  ----------------------------<  200<H>  4.4   |  27  |  1.85<H>    Ca    9.3      13 Sep 2022 07:28  Phos  3.7     09-12  Mg     1.9     09-12          Labs personally reviewed      ASSESSMENT/PLAN: 	    Patient is an 82yof w/ hx of HTN, hyperlipidemia, type 2 diabetes mellitus, breast cancer status post right partial mastectomy, rectal carcinoma status post resection, anemia, diastolic heart failure, hx of right lower extremity DVT, CAD s/p 2 vessel CABG 11/9/2020 and renal insufficiency who was admitted for volume overload.     Problem/Plan - 1:  ·  Problem: Acute on chronic diastolic heart failure.   ·  Plan: Noncompliance with OP diuretic regimen  Repeat TTE with impaired E/e' c/w increased filling pressures LV and volume overload  -Continues to complain of SOB though improved from admission  + JVD  - BNP significantly decreased (1038->236)  - Repeat CXR pending  - Cr downtrending  - Agree with transition to PO Bumex    Problem/Plan - 2:  ·  Problem: Stage 3 chronic kidney disease.   ·  Plan: Renal FU appreciated  - Cr downtrending    Problem/Plan - 3:  ·  Problem: Diabetes mellitus with hyperglycemia.   ·  Plan: A1c on Aug 23, 2022 as 9.6  -Continue Insulin Lantus 16u HS, Lispro 7u TIDwm       Problem/Plan - 4:  ·  Problem: Hyperlipidemia.   ·  Plan: Continue home meds atorvastatin 40mg              Delaney Vasquez, FABIOLA-SEE Winchester DO [facility]  Cardiovascular Medicine  [address] [place] [address], [address]  Office: [phone]  Cell: [phone] DATE OF SERVICE: 09-13-22 @ 15:21    Patient is a 82y old  Female who presents with a chief complaint of CHF (12 Sep 2022 17:19)      INTERVAL HISTORY: Feels ok.     REVIEW OF SYSTEMS:  CONSTITUTIONAL: No weakness  EYES/ENT: No visual changes;  No throat pain   NECK: No pain or stiffness  RESPIRATORY: No cough, wheezing; No shortness of breath  CARDIOVASCULAR: No chest pain or palpitations  GASTROINTESTINAL: No abdominal  pain. No nausea, vomiting, or hematemesis  GENITOURINARY: No dysuria, frequency or hematuria  NEUROLOGICAL: No stroke like symptoms  SKIN: No rashes      	  MEDICATIONS:  amLODIPine   Tablet 5 milliGRAM(s) Oral daily  buMETAnide 1 milliGRAM(s) Oral two times a day  metoprolol succinate  milliGRAM(s) Oral daily  spironolactone 12.5 milliGRAM(s) Oral daily        PHYSICAL EXAM:  T(C): 36.3 (09-13-22 @ 09:19), Max: 36.5 (09-12-22 @ 16:25)  HR: 65 (09-13-22 @ 09:19) (65 - 68)  BP: 125/74 (09-13-22 @ 09:19) (114/52 - 133/83)  RR: 18 (09-13-22 @ 09:19) (17 - 18)  SpO2: 96% (09-13-22 @ 09:19) (93% - 96%)  Wt(kg): --  I&O's Summary    12 Sep 2022 07:01  -  13 Sep 2022 07:00  --------------------------------------------------------  IN: 400 mL / OUT: 1800 mL / NET: -1400 mL          Appearance: In no distress	  HEENT:    PERRL, EOMI	  Cardiovascular:  S1 S2, No JVD  Respiratory: Lungs clear to auscultation	  Gastrointestinal:  Soft, Non-tender, + BS	  Vascularature:  No edema of LE  Psychiatric: Appropriate affect   Neuro: no acute focal deficits                               13.0   8.86  )-----------( 270      ( 13 Sep 2022 07:28 )             42.9     09-13    138  |  98  |  52<H>  ----------------------------<  200<H>  4.4   |  27  |  1.85<H>    Ca    9.3      13 Sep 2022 07:28  Phos  3.7     09-12  Mg     1.9     09-12          Labs personally reviewed      ASSESSMENT/PLAN: 	    Patient is an 82yof w/ hx of HTN, hyperlipidemia, type 2 diabetes mellitus, breast cancer status post right partial mastectomy, rectal carcinoma status post resection, anemia, diastolic heart failure, hx of right lower extremity DVT, CAD s/p 2 vessel CABG 11/9/2020 and renal insufficiency who was admitted for volume overload.     Problem/Plan - 1:  ·  Problem: Acute on chronic diastolic heart failure.   ·  Plan: Noncompliance with OP diuretic regimen  Repeat TTE with impaired E/e' c/w increased filling pressures LV and volume overload  -Continues to complain of SOB though improved from admission  + JVD  - BNP significantly decreased (1038->236)  - Repeat CXR pending  - Cr downtrending  - Transition to PO Bumex 1mg PO BID    Problem/Plan - 2:  ·  Problem: Stage 3 chronic kidney disease.   ·  Plan: Renal FU appreciated  - Cr downtrending    Problem/Plan - 3:  ·  Problem: Diabetes mellitus with hyperglycemia.   ·  Plan: A1c on Aug 23, 2022 as 9.6  -Continue Insulin Lantus 16u HS, Lispro 7u TIDwm       Problem/Plan - 4:  ·  Problem: Hyperlipidemia.   ·  Plan: Continue home meds atorvastatin 40mg              Delaney Vasquez, FABIOLA-NP   Sergey Winchester DO [facility]  Cardiovascular Medicine  [address] [place] [address], [address]  Office: [phone]  Cell: [phone]

## 2022-09-13 NOTE — PROGRESS NOTE ADULT - PROBLEM SELECTOR PLAN 4
GFR 37%  -patient follows [facility] outpatient, consulted [facility]    -sCr worsened in the setting of diuretics   -Diuretics per nephrology recs  -strict Is/Os. Check daily BMP  -avoid nephrotoxic agents  -renally dose all medications    #Hypokalemia: Give K supplement to maintain K >4.

## 2022-09-13 NOTE — PROGRESS NOTE ADULT - PROBLEM SELECTOR PLAN 2
A1c on Aug 23, 2022 (outpatient records) was 9.6    -Continue gabapentin for neuropathy (pt takes ER, but while inpatient continue 200 TID)  -Continue Insulin Lantus 18u HS, Lispro 8u TIDwm  -Monitor fingerstick glu checks, goal 100-180  -ISS  -Nutritionist eval  >     >For discharge: DM home regimen per Endo team: -Prandin 1mg TID before meals and Tradjenta 5mg daily  -Discontinue prior hypoglycemic agents : Januvia and glimepiride. -F/U outpatient in 4 weeks

## 2022-09-13 NOTE — PROGRESS NOTE ADULT - ASSESSMENT
ASSESSMENT:  81 yo F wit a PMH of breast CA, DM, HTN, HLD, CHF, chronic RUE lymphedema p/w substernal/epigastric pain, admitted for HF, nephrology consulted for CKD.    -CKD stage 3/4---- base line creatinine 1.5 mg/dl  -Dae -non oliguric ---improving, no on po diuretics  -Lytes controlled  -HTN- blood pressure soft  -CHF ---volume overload   -acid base----monitor bicarb while on diuretics  -UTI---on abx  -lymphedema    RECOMMENDATION:  -a/w Bumex 1mg po bid  -continue spironolactone 12.5 mg daily   -monitor bmp   -Avoid nephrotoxins as able  -Dose meds for GFR 30 ml/min  -monitor I/Os  -daily weights  -urology rec appreciated     D/w Dr. Dior Mike, NP-C  [facility]  [phone]  ASSESSMENT:  81 yo F wit a PMH of breast CA, DM, HTN, HLD, CHF, chronic RUE lymphedema p/w substernal/epigastric pain, admitted for HF, nephrology consulted for CKD.    -CKD stage 3/4---- base line creatinine 1.5 mg/dl  -Dae -non oliguric ---improving, no on po diuretics  -Lytes controlled  -HTN- blood pressure controlled   -CHF ---volume overload   -acid base----monitor bicarb while on diuretics  -UTI---on abx  -lymphedema    RECOMMENDATION:  -a/w Bumex 1mg po bid  -continue spironolactone 12.5 mg daily   -monitor bmp   -Avoid nephrotoxins as able  -Dose meds for GFR 30 ml/min  -monitor I/Os  -daily weights  -urology rec appreciated     D/w Dr. Dior Mike, NP-C  [facility]  [phone]

## 2022-09-14 LAB
ANION GAP SERPL CALC-SCNC: 15 MMOL/L — SIGNIFICANT CHANGE UP (ref 5–17)
BASOPHILS # BLD AUTO: 0.04 K/UL — SIGNIFICANT CHANGE UP (ref 0–0.2)
BASOPHILS NFR BLD AUTO: 0.5 % — SIGNIFICANT CHANGE UP (ref 0–2)
BUN SERPL-MCNC: 55 MG/DL — HIGH (ref 7–23)
CALCIUM SERPL-MCNC: 9.3 MG/DL — SIGNIFICANT CHANGE UP (ref 8.4–10.5)
CHLORIDE SERPL-SCNC: 94 MMOL/L — LOW (ref 96–108)
CO2 SERPL-SCNC: 26 MMOL/L — SIGNIFICANT CHANGE UP (ref 22–31)
CREAT SERPL-MCNC: 2.03 MG/DL — HIGH (ref 0.5–1.3)
EGFR: 24 ML/MIN/1.73M2 — LOW
EOSINOPHIL # BLD AUTO: 0.06 K/UL — SIGNIFICANT CHANGE UP (ref 0–0.5)
EOSINOPHIL NFR BLD AUTO: 0.7 % — SIGNIFICANT CHANGE UP (ref 0–6)
GLUCOSE BLDC GLUCOMTR-MCNC: 161 MG/DL — HIGH (ref 70–99)
GLUCOSE BLDC GLUCOMTR-MCNC: 189 MG/DL — HIGH (ref 70–99)
GLUCOSE BLDC GLUCOMTR-MCNC: 194 MG/DL — HIGH (ref 70–99)
GLUCOSE BLDC GLUCOMTR-MCNC: 231 MG/DL — HIGH (ref 70–99)
GLUCOSE SERPL-MCNC: 281 MG/DL — HIGH (ref 70–99)
HCT VFR BLD CALC: 43.1 % — SIGNIFICANT CHANGE UP (ref 34.5–45)
HGB BLD-MCNC: 13.2 G/DL — SIGNIFICANT CHANGE UP (ref 11.5–15.5)
IMM GRANULOCYTES NFR BLD AUTO: 0.4 % — SIGNIFICANT CHANGE UP (ref 0–1.5)
LYMPHOCYTES # BLD AUTO: 1.37 K/UL — SIGNIFICANT CHANGE UP (ref 1–3.3)
LYMPHOCYTES # BLD AUTO: 16.2 % — SIGNIFICANT CHANGE UP (ref 13–44)
MCHC RBC-ENTMCNC: 29.3 PG — SIGNIFICANT CHANGE UP (ref 27–34)
MCHC RBC-ENTMCNC: 30.6 GM/DL — LOW (ref 32–36)
MCV RBC AUTO: 95.6 FL — SIGNIFICANT CHANGE UP (ref 80–100)
MONOCYTES # BLD AUTO: 0.71 K/UL — SIGNIFICANT CHANGE UP (ref 0–0.9)
MONOCYTES NFR BLD AUTO: 8.4 % — SIGNIFICANT CHANGE UP (ref 2–14)
NEUTROPHILS # BLD AUTO: 6.27 K/UL — SIGNIFICANT CHANGE UP (ref 1.8–7.4)
NEUTROPHILS NFR BLD AUTO: 73.8 % — SIGNIFICANT CHANGE UP (ref 43–77)
NRBC # BLD: 0 /100 WBCS — SIGNIFICANT CHANGE UP (ref 0–0)
PLATELET # BLD AUTO: 284 K/UL — SIGNIFICANT CHANGE UP (ref 150–400)
POTASSIUM SERPL-MCNC: 3.9 MMOL/L — SIGNIFICANT CHANGE UP (ref 3.5–5.3)
POTASSIUM SERPL-SCNC: 3.9 MMOL/L — SIGNIFICANT CHANGE UP (ref 3.5–5.3)
RBC # BLD: 4.51 M/UL — SIGNIFICANT CHANGE UP (ref 3.8–5.2)
RBC # FLD: 15.3 % — HIGH (ref 10.3–14.5)
SODIUM SERPL-SCNC: 135 MMOL/L — SIGNIFICANT CHANGE UP (ref 135–145)
WBC # BLD: 8.48 K/UL — SIGNIFICANT CHANGE UP (ref 3.8–10.5)
WBC # FLD AUTO: 8.48 K/UL — SIGNIFICANT CHANGE UP (ref 3.8–10.5)

## 2022-09-14 PROCEDURE — 93971 EXTREMITY STUDY: CPT | Mod: 26,LT

## 2022-09-14 PROCEDURE — 73030 X-RAY EXAM OF SHOULDER: CPT | Mod: 26,LT

## 2022-09-14 PROCEDURE — 99232 SBSQ HOSP IP/OBS MODERATE 35: CPT

## 2022-09-14 RX ORDER — INSULIN GLARGINE 100 [IU]/ML
20 INJECTION, SOLUTION SUBCUTANEOUS AT BEDTIME
Refills: 0 | Status: DISCONTINUED | OUTPATIENT
Start: 2022-09-14 | End: 2022-09-16

## 2022-09-14 RX ORDER — ACETAMINOPHEN 500 MG
650 TABLET ORAL ONCE
Refills: 0 | Status: COMPLETED | OUTPATIENT
Start: 2022-09-14 | End: 2022-09-14

## 2022-09-14 RX ORDER — LIDOCAINE 4 G/100G
1 CREAM TOPICAL EVERY 24 HOURS
Refills: 0 | Status: DISCONTINUED | OUTPATIENT
Start: 2022-09-14 | End: 2022-09-16

## 2022-09-14 RX ORDER — INSULIN LISPRO 100/ML
9 VIAL (ML) SUBCUTANEOUS
Refills: 0 | Status: DISCONTINUED | OUTPATIENT
Start: 2022-09-14 | End: 2022-09-16

## 2022-09-14 RX ADMIN — Medication 1 APPLICATION(S): at 17:00

## 2022-09-14 RX ADMIN — APIXABAN 2.5 MILLIGRAM(S): 2.5 TABLET, FILM COATED ORAL at 05:13

## 2022-09-14 RX ADMIN — GABAPENTIN 200 MILLIGRAM(S): 400 CAPSULE ORAL at 21:47

## 2022-09-14 RX ADMIN — LIDOCAINE 1 PATCH: 4 CREAM TOPICAL at 17:04

## 2022-09-14 RX ADMIN — LIDOCAINE 1 PATCH: 4 CREAM TOPICAL at 16:59

## 2022-09-14 RX ADMIN — Medication 8 UNIT(S): at 08:20

## 2022-09-14 RX ADMIN — Medication 9 UNIT(S): at 16:58

## 2022-09-14 RX ADMIN — Medication 8 UNIT(S): at 12:00

## 2022-09-14 RX ADMIN — BUMETANIDE 1 MILLIGRAM(S): 0.25 INJECTION INTRAMUSCULAR; INTRAVENOUS at 05:12

## 2022-09-14 RX ADMIN — POLYETHYLENE GLYCOL 3350 17 GRAM(S): 17 POWDER, FOR SOLUTION ORAL at 11:47

## 2022-09-14 RX ADMIN — BUMETANIDE 1 MILLIGRAM(S): 0.25 INJECTION INTRAMUSCULAR; INTRAVENOUS at 14:09

## 2022-09-14 RX ADMIN — LIDOCAINE 1 PATCH: 4 CREAM TOPICAL at 06:44

## 2022-09-14 RX ADMIN — SPIRONOLACTONE 12.5 MILLIGRAM(S): 25 TABLET, FILM COATED ORAL at 05:12

## 2022-09-14 RX ADMIN — FAMOTIDINE 20 MILLIGRAM(S): 10 INJECTION INTRAVENOUS at 11:47

## 2022-09-14 RX ADMIN — Medication 150 MILLIGRAM(S): at 05:14

## 2022-09-14 RX ADMIN — Medication 2: at 11:59

## 2022-09-14 RX ADMIN — ERTAPENEM SODIUM 100 MILLIGRAM(S): 1 INJECTION, POWDER, LYOPHILIZED, FOR SOLUTION INTRAMUSCULAR; INTRAVENOUS at 05:57

## 2022-09-14 RX ADMIN — INSULIN GLARGINE 20 UNIT(S): 100 INJECTION, SOLUTION SUBCUTANEOUS at 21:47

## 2022-09-14 RX ADMIN — Medication 1: at 16:58

## 2022-09-14 RX ADMIN — ATORVASTATIN CALCIUM 40 MILLIGRAM(S): 80 TABLET, FILM COATED ORAL at 21:47

## 2022-09-14 RX ADMIN — APIXABAN 2.5 MILLIGRAM(S): 2.5 TABLET, FILM COATED ORAL at 17:00

## 2022-09-14 RX ADMIN — GABAPENTIN 200 MILLIGRAM(S): 400 CAPSULE ORAL at 05:12

## 2022-09-14 RX ADMIN — Medication 81 MILLIGRAM(S): at 11:47

## 2022-09-14 RX ADMIN — AMLODIPINE BESYLATE 5 MILLIGRAM(S): 2.5 TABLET ORAL at 05:12

## 2022-09-14 RX ADMIN — Medication 650 MILLIGRAM(S): at 06:43

## 2022-09-14 RX ADMIN — Medication 1: at 08:19

## 2022-09-14 RX ADMIN — GABAPENTIN 200 MILLIGRAM(S): 400 CAPSULE ORAL at 14:06

## 2022-09-14 RX ADMIN — Medication 1 APPLICATION(S): at 06:24

## 2022-09-14 RX ADMIN — LIDOCAINE 1 PATCH: 4 CREAM TOPICAL at 04:49

## 2022-09-14 RX ADMIN — LIDOCAINE 1 PATCH: 4 CREAM TOPICAL at 21:49

## 2022-09-14 NOTE — PROGRESS NOTE ADULT - PROBLEM SELECTOR PLAN 5
Continue home meds atorvastatin 40, asa 81      #Back pain:   Evaluation for UTI and hydronephrosis as above  Suspect MSK element. Continue lidocaine patches  Continue PT as tolerated. discussed with PT, encourage OOBTC during the day    #Left arm pain: F/U left shoulder x-ray, left upper extremity duplex US

## 2022-09-14 NOTE — PROGRESS NOTE ADULT - SUBJECTIVE AND OBJECTIVE BOX
Patient seen and examined, reports feeling ok, no sob, comfortable on room air, currently in no acute distress.   UOP good     MEDICATIONS  (STANDING):  amLODIPine   Tablet 5 milliGRAM(s) Oral daily  apixaban 2.5 milliGRAM(s) Oral two times a day  aspirin  chewable 81 milliGRAM(s) Oral daily  atorvastatin 40 milliGRAM(s) Oral at bedtime  BACItracin   Ointment 1 Application(s) Topical two times a day  buMETAnide 1 milliGRAM(s) Oral two times a day  dextrose 5%. 1000 milliLiter(s) (50 mL/Hr) IV Continuous <Continuous>  dextrose 5%. 1000 milliLiter(s) (100 mL/Hr) IV Continuous <Continuous>  dextrose 50% Injectable 25 Gram(s) IV Push once  dextrose 50% Injectable 12.5 Gram(s) IV Push once  dextrose 50% Injectable 25 Gram(s) IV Push once  ertapenem  IVPB 500 milliGRAM(s) IV Intermittent every 24 hours  ertapenem  IVPB      famotidine    Tablet 20 milliGRAM(s) Oral daily  gabapentin 200 milliGRAM(s) Oral three times a day  glucagon  Injectable 1 milliGRAM(s) IntraMuscular once  insulin glargine Injectable (LANTUS) 18 Unit(s) SubCutaneous at bedtime  insulin lispro (ADMELOG) corrective regimen sliding scale   SubCutaneous three times a day before meals  insulin lispro (ADMELOG) corrective regimen sliding scale   SubCutaneous at bedtime  insulin lispro Injectable (ADMELOG) 8 Unit(s) SubCutaneous three times a day before meals  lidocaine   4% Patch 1 Patch Transdermal every 24 hours  metoprolol succinate  milliGRAM(s) Oral daily  polyethylene glycol 3350 17 Gram(s) Oral daily  spironolactone 12.5 milliGRAM(s) Oral daily    VITALS:  T(C): , Max: 36.5 (09-12-22 @ 16:25)  T(F): , Max: 97.7 (09-12-22 @ 16:25)  HR: 65 (09-13-22 @ 09:19)  BP: 125/74 (09-13-22 @ 09:19)  RR: 18 (09-13-22 @ 09:19)  SpO2: 96% (09-13-22 @ 09:19)    I and O's:    09-12 @ 07:01  -  09-13 @ 07:00  --------------------------------------------------------  IN: 400 mL / OUT: 1800 mL / NET: -1400 mL    PHYSICAL EXAM:  General: Alerted, oriented  HEENT: MMM  Lungs: diminished at bases  Heart: RRR S1S2  Abdomen: Soft, NTND  Ext:   edema b/l  : no chen    LABS:                        13.0   8.86  )-----------( 270      ( 13 Sep 2022 07:28 )             42.9     09-13    138  |  98  |  52<H>  ----------------------------<  200<H>  4.4   |  27  |  1.85<H>    Ca    9.3      13 Sep 2022 07:28  Phos  3.7     09-12  Mg     1.9     09-12    RADIOLOGIC STUDIES:         ACC: [id number] EXAM:  NM KIDNEY IMG LASIX                          PROCEDURE DATE:  09/13/2022          INTERPRETATION:  RADIOPHARMACEUTICAL: 10.2 mCi   [id number] (MAG3), I.V.    CLINICAL STATEMENT: 82-year-old female with history of breast cancer   presents with chronic severe right hydronephrosis.    TECHNIQUE: Patient came with indwelling Chen catheter in place. Patient   was hydrated with 850 mL of normal saline solution intravenously. Dynamic   images of the posterior abdomen were obtained for 49 minutes following   administration of radiopharmaceutical.  Lasix 40 mg I.V. was administered   at 26 minutes post-injection.    FINDINGS:  The renal perfusion images delayed and diminished flow to the   left kidney and nodiscernible flow to the right renal bed.    The functional images show good cortical uptake of radiotracer by the   left kidney. There is prompt appearance of activity in the left   collecting system. There is spontaneous drainage of activity from the   left collecting system prior to Lasix. There is mild cortical retention   of activity at the end of the study suggestive of mildly impaired renal   function.    There is no evidence of a functioning right kidney.    IMPRESSION: Abnormal diuretic renal scan.    Mildly diminished flow to and function of the left kidney with no   evidence of obstruction.    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    --- End of Report --      FRITZ TEMPLE MD; Attending Nuclear Medicine  This document has been electronically signed. Sep 13 2022  3:42PM

## 2022-09-14 NOTE — PROGRESS NOTE ADULT - ASSESSMENT
Assessment  DMT2: 82y Female with DM T2 with hyperglycemia, A1C 9.4%, was on oral meds at home, now on basal bolus insulin, blood sugars are still running slightly high and not at target, no hypoglycemic episodes, eating meals, appears comfortable, no acute events.  CHF: on medications, no chest pain, stable, monitored.  HTN: Controlled,  on antihypertensive medications.  CKD: Monitor labs/BMP,   Obesity: No strict exercise routines, not on any weight loss program, neither on low calorie diet.        Dior Monroy MD  Cell: [phone]  Office: [phone]     Assessment  DMT2: 82y Female with DM T2 with hyperglycemia, A1C 9.4%, was on oral meds at home, now on basal bolus insulin, blood sugars are still  running slightly high and not at target, no hypoglycemic episodes, eating meals, appears comfortable, no acute events.  CHF: on medications, no chest pain, stable, monitored.  HTN: Controlled,  on antihypertensive medications.  CKD: Monitor labs/BMP,   Obesity: No strict exercise routines, not on any weight loss program, neither on low calorie diet.        Dior Monroy MD  Cell: [phone]  Office: [phone]

## 2022-09-14 NOTE — PROGRESS NOTE ADULT - PROBLEM SELECTOR PLAN 2
CT showing "Suggestion of chronic and severe right-sided hydronephrosis as on 11/3/2020 CT chest"  -May be a source of worsening CKD  -Renal u/s with chronic severe R sided hydronephrosis  >      >Renal Lasix scan done: No evidence of a functioning right kidney > Re-consult urology team for further recs  -Monitor bladder scans for PVR    #UTI: patient complaining of urinary symptoms and UA + with urine culture grew e coli >100k,  now sensitivities showing ESBL   -Started Ceftriaxone 9/10, Switched to Ertapenem 9/12

## 2022-09-14 NOTE — PROVIDER CONTACT NOTE (OTHER) - BACKGROUND
pmhx HTN, hyperlipidemia, DM, breast cancer c/p right partial mastectomy, right lower extremity DVT,

## 2022-09-14 NOTE — PROVIDER CONTACT NOTE (OTHER) - ASSESSMENT
patient has pain in her left arm and shoulder. no warmth felt on assessment, no edema or redness noted.

## 2022-09-14 NOTE — PROVIDER CONTACT NOTE (OTHER) - ACTION/TREATMENT ORDERED:
SEE Kang notified and aware. NP came to bedside to assess. left arm xray and duplex scans ordered. tylenol administered for pain. will continue to monitor

## 2022-09-14 NOTE — PROGRESS NOTE ADULT - SUBJECTIVE AND OBJECTIVE BOX
[facility] Division of Hospital Medicine  Margy Almaraz MD M-F, 8A-5P: MS Teams, Pager: [phone]  Other Times: Ext: [phone], Pager: [phone]      Patient is a 82y old  Female who presents with a chief complaint of CHF (12 Sep 2022 17:19)      SUBJECTIVE / OVERNIGHT EVENTS:  Patient with abnormal renal lasix scan. Overnight with left arm pain.     ADDITIONAL REVIEW OF SYSTEMS:    MEDICATIONS  (STANDING):  amLODIPine   Tablet 5 milliGRAM(s) Oral daily  apixaban 2.5 milliGRAM(s) Oral two times a day  aspirin  chewable 81 milliGRAM(s) Oral daily  atorvastatin 40 milliGRAM(s) Oral at bedtime  BACItracin   Ointment 1 Application(s) Topical two times a day  buMETAnide 1 milliGRAM(s) Oral two times a day  dextrose 5%. 1000 milliLiter(s) (100 mL/Hr) IV Continuous <Continuous>  dextrose 5%. 1000 milliLiter(s) (50 mL/Hr) IV Continuous <Continuous>  dextrose 50% Injectable 25 Gram(s) IV Push once  dextrose 50% Injectable 12.5 Gram(s) IV Push once  dextrose 50% Injectable 25 Gram(s) IV Push once  ertapenem  IVPB 500 milliGRAM(s) IV Intermittent every 24 hours  ertapenem  IVPB      famotidine    Tablet 20 milliGRAM(s) Oral daily  gabapentin 200 milliGRAM(s) Oral three times a day  glucagon  Injectable 1 milliGRAM(s) IntraMuscular once  insulin glargine Injectable (LANTUS) 18 Unit(s) SubCutaneous at bedtime  insulin lispro (ADMELOG) corrective regimen sliding scale   SubCutaneous three times a day before meals  insulin lispro (ADMELOG) corrective regimen sliding scale   SubCutaneous at bedtime  insulin lispro Injectable (ADMELOG) 8 Unit(s) SubCutaneous three times a day before meals  lidocaine   4% Patch 1 Patch Transdermal every 24 hours  lidocaine   4% Patch 1 Patch Transdermal every 24 hours  metoprolol succinate  milliGRAM(s) Oral daily  polyethylene glycol 3350 17 Gram(s) Oral daily  spironolactone 12.5 milliGRAM(s) Oral daily    MEDICATIONS  (PRN):  dextrose Oral Gel 15 Gram(s) Oral once PRN Blood Glucose LESS THAN 70 milliGRAM(s)/deciliter  melatonin 3 milliGRAM(s) Oral at bedtime PRN Insomnia      CAPILLARY BLOOD GLUCOSE      POCT Blood Glucose.: 231 mg/dL (14 Sep 2022 11:24)  POCT Blood Glucose.: 189 mg/dL (14 Sep 2022 07:50)  POCT Blood Glucose.: 212 mg/dL (13 Sep 2022 21:44)  POCT Blood Glucose.: 211 mg/dL (13 Sep 2022 16:42)      I&O's Summary    13 Sep 2022 07:01  -  14 Sep 2022 07:00  --------------------------------------------------------  IN: 450 mL / OUT: 2600 mL / NET: -2150 mL        Daily     Daily Weight in k.3 (14 Sep 2022 09:12)    PHYSICAL EXAM:  Vital Signs Last 24 Hrs  T(C): 36.2 (14 Sep 2022 07:45), Max: 37.1 (13 Sep 2022 23:16)  T(F): 97.1 (14 Sep 2022 07:45), Max: 98.7 (13 Sep 2022 23:16)  HR: 73 (14 Sep 2022 07:45) (65 - 73)  BP: 129/57 (14 Sep 2022 07:45) (122/66 - 136/62)  BP(mean): --  RR: 16 (14 Sep 2022 07:45) (16 - 18)  SpO2: 93% (14 Sep 2022 07:45) (92% - 93%)    Parameters below as of 14 Sep 2022 07:45  Patient On (Oxygen Delivery Method): room air      CONSTITUTIONAL: NAD, well-developed  EYES: PERRLA; conjunctiva and sclera clear  ENMT: Moist oral mucosa,  NECK: Supple,  RESPIRATORY: Normal respiratory effort; lungs are clear to auscultation bilaterally  CARDIOVASCULAR: Regular rate and rhythm, normal S1 and S2, no murmur/rub/gallop;   +Mild pedal edema; +Right UE edema: chronic per patient. Peripheral pulses are 2+ bilaterally.   ABDOMEN: Nontender to palpation, normoactive bowel sounds, no rebound/guarding;   BACK: no back tenderness on exam  MUSCULOSKELETAL:  no clubbing or cyanosis of digits; no joint swelling or tenderness to palpation, moving all extremities   PSYCH: A+O to person, place, and time; affect appropriate  SKIN: chronic BL LE venous stasis ulcerations         LABS:                        13.0   8.86  )-----------( 270      ( 13 Sep 2022 07:28 )             42.9     09-    138  |  98  |  52<H>  ----------------------------<  200<H>  4.4   |  27  |  1.85<H>    Ca    9.3      13 Sep 2022 07:28                  SARS-CoV-2: NotDetec (07 Sep 2022 19:04)  COVID-19 PCR: NotDetec (07 Sep 2022 12:16)      RADIOLOGY & ADDITIONAL TESTS:  Results Reviewed:   Imaging Personally Reviewed:  Electrocardiogram Personally Reviewed:    COORDINATION OF CARE:  Care Discussed with Consultants/Other Providers [Y/N]:  Prior or Outpatient Records Reviewed [Y/N]:

## 2022-09-14 NOTE — PROGRESS NOTE ADULT - SUBJECTIVE AND OBJECTIVE BOX
Chief complaint  Patient is a 82y old  Female who presents with a chief complaint of CHF (12 Sep 2022 17:19)   Review of systems  Patient in bed, looks comfortable, no hypoglycemic episodes.    Labs and Fingersticks  CAPILLARY BLOOD GLUCOSE      POCT Blood Glucose.: 231 mg/dL (14 Sep 2022 11:24)  POCT Blood Glucose.: 189 mg/dL (14 Sep 2022 07:50)  POCT Blood Glucose.: 212 mg/dL (13 Sep 2022 21:44)  POCT Blood Glucose.: 211 mg/dL (13 Sep 2022 16:42)      Anion Gap, Serum: 15 (09-14 @ 12:47)  Anion Gap, Serum: 13 (09-13 @ 07:28)      Calcium, Total Serum: 9.3 (09-14 @ 12:47)  Calcium, Total Serum: 9.3 (09-13 @ 07:28)          09-14    135  |  94<L>  |  55<H>  ----------------------------<  281<H>  3.9   |  26  |  2.03<H>    Ca    9.3      14 Sep 2022 12:47                          13.2   8.48  )-----------( 284      ( 14 Sep 2022 12:47 )             43.1     Medications  MEDICATIONS  (STANDING):  amLODIPine   Tablet 5 milliGRAM(s) Oral daily  apixaban 2.5 milliGRAM(s) Oral two times a day  aspirin  chewable 81 milliGRAM(s) Oral daily  atorvastatin 40 milliGRAM(s) Oral at bedtime  BACItracin   Ointment 1 Application(s) Topical two times a day  buMETAnide 1 milliGRAM(s) Oral two times a day  dextrose 5%. 1000 milliLiter(s) (100 mL/Hr) IV Continuous <Continuous>  dextrose 5%. 1000 milliLiter(s) (50 mL/Hr) IV Continuous <Continuous>  dextrose 50% Injectable 25 Gram(s) IV Push once  dextrose 50% Injectable 12.5 Gram(s) IV Push once  dextrose 50% Injectable 25 Gram(s) IV Push once  ertapenem  IVPB 500 milliGRAM(s) IV Intermittent every 24 hours  ertapenem  IVPB      famotidine    Tablet 20 milliGRAM(s) Oral daily  gabapentin 200 milliGRAM(s) Oral three times a day  glucagon  Injectable 1 milliGRAM(s) IntraMuscular once  insulin glargine Injectable (LANTUS) 20 Unit(s) SubCutaneous at bedtime  insulin lispro (ADMELOG) corrective regimen sliding scale   SubCutaneous three times a day before meals  insulin lispro (ADMELOG) corrective regimen sliding scale   SubCutaneous at bedtime  insulin lispro Injectable (ADMELOG) 9 Unit(s) SubCutaneous three times a day before meals  lidocaine   4% Patch 1 Patch Transdermal every 24 hours  lidocaine   4% Patch 1 Patch Transdermal every 24 hours  metoprolol succinate  milliGRAM(s) Oral daily  polyethylene glycol 3350 17 Gram(s) Oral daily  spironolactone 12.5 milliGRAM(s) Oral daily      Physical Exam  General: Patient comfortable in bed  Vital Signs Last 12 Hrs  T(F): 97.1 (09-14-22 @ 07:45), Max: 97.1 (09-14-22 @ 07:45)  HR: 73 (09-14-22 @ 07:45) (68 - 73)  BP: 129/57 (09-14-22 @ 07:45) (129/57 - 133/52)  BP(mean): --  RR: 16 (09-14-22 @ 07:45) (16 - 18)  SpO2: 93% (09-14-22 @ 07:45) (93% - 93%)  Neck: No palpable thyroid nodules.  CVS: S1S2, No murmurs  Respiratory: No wheezing, no crepitations  GI: Abdomen soft, bowel sounds positive  Musculoskeletal:  edema lower extremities.   Skin: No skin rashes, no ecchymosis    Diagnostics             Chief complaint  Patient is a 82y old  Female who presents with a chief complaint of CHF (12 Sep 2022 17:19)   Review of systems  Patient in bed, looks comfortable, no hypoglycemic episodes.    Labs and Fingersticks  CAPILLARY BLOOD GLUCOSE      POCT Blood Glucose.: 231 mg/dL (14 Sep 2022 11:24)  POCT Blood Glucose.: 189 mg/dL (14 Sep 2022 07:50)  POCT Blood Glucose.: 212 mg/dL (13 Sep 2022 21:44)  POCT Blood Glucose.: 211 mg/dL (13 Sep 2022 16:42)      Anion Gap, Serum: 15 (09-14 @ 12:47)  Anion Gap, Serum: 13 (09-13 @ 07:28)      Calcium, Total Serum: 9.3 (09-14 @ 12:47)  Calcium, Total Serum: 9.3 (09-13 @ 07:28)          09-14    135  |  94<L>  |  55<H>  ----------------------------<  281<H>  3.9   |  26  |  2.03<H>    Ca    9.3      14 Sep 2022 12:47                          13.2   8.48  )-----------( 284      ( 14 Sep 2022 12:47 )             43.1     Medications  MEDICATIONS  (STANDING):  amLODIPine   Tablet 5 milliGRAM(s) Oral daily  apixaban 2.5 milliGRAM(s) Oral two times a day  aspirin  chewable 81 milliGRAM(s) Oral daily  atorvastatin 40 milliGRAM(s) Oral at bedtime  BACItracin   Ointment 1 Application(s) Topical two times a day  buMETAnide 1 milliGRAM(s) Oral two times a day  dextrose 5%. 1000 milliLiter(s) (100 mL/Hr) IV Continuous <Continuous>  dextrose 5%. 1000 milliLiter(s) (50 mL/Hr) IV Continuous <Continuous>  dextrose 50% Injectable 25 Gram(s) IV Push once  dextrose 50% Injectable 12.5 Gram(s) IV Push once  dextrose 50% Injectable 25 Gram(s) IV Push once  ertapenem  IVPB 500 milliGRAM(s) IV Intermittent every 24 hours  ertapenem  IVPB      famotidine    Tablet 20 milliGRAM(s) Oral daily  gabapentin 200 milliGRAM(s) Oral three times a day  glucagon  Injectable 1 milliGRAM(s) IntraMuscular once  insulin glargine Injectable (LANTUS) 20 Unit(s) SubCutaneous at bedtime  insulin lispro (ADMELOG) corrective regimen sliding scale   SubCutaneous three times a day before meals  insulin lispro (ADMELOG) corrective regimen sliding scale   SubCutaneous at bedtime  insulin lispro Injectable (ADMELOG) 9 Unit(s) SubCutaneous three times a day before meals  lidocaine   4% Patch 1 Patch Transdermal every 24 hours  lidocaine   4% Patch 1 Patch Transdermal every 24 hours  metoprolol succinate  milliGRAM(s) Oral daily  polyethylene glycol 3350 17 Gram(s) Oral daily  spironolactone 12.5 milliGRAM(s) Oral daily      Physical Exam  General: Patient comfortable in bed  Vital Signs Last 12 Hrs  T(F): 97.1 (09-14-22 @ 07:45), Max: 97.1 (09-14-22 @ 07:45)  HR: 73 (09-14-22 @ 07:45) (68 - 73)  BP: 129/57 (09-14-22 @ 07:45) (129/57 - 133/52)  BP(mean): --  RR: 16 (09-14-22 @ 07:45) (16 - 18)  SpO2: 93% (09-14-22 @ 07:45) (93% - 93%)  Neck: No palpable thyroid nodules.  CVS: S1S2, No murmurs  Respiratory: No wheezing, no crepitations  GI: Abdomen soft, bowel sounds positive  Musculoskeletal:  edema lower extremities.   Skin: No skin rashes, no ecchymosis    Diagnostics

## 2022-09-14 NOTE — PROGRESS NOTE ADULT - SUBJECTIVE AND OBJECTIVE BOX
DATE OF SERVICE: 09-14-22 @ 15:31    Patient is a 82y old  Female who presents with a chief complaint of CHF (12 Sep 2022 17:19)      INTERVAL HISTORY: No acute complaints    REVIEW OF SYSTEMS:  CONSTITUTIONAL: No weakness  EYES/ENT: No visual changes;  No throat pain   NECK: No pain or stiffness  RESPIRATORY: No cough, wheezing; No shortness of breath  CARDIOVASCULAR: No chest pain or palpitations  GASTROINTESTINAL: No abdominal  pain. No nausea, vomiting, or hematemesis  GENITOURINARY: No dysuria, frequency or hematuria  NEUROLOGICAL: No stroke like symptoms  SKIN: No rashes    	  MEDICATIONS:  amLODIPine   Tablet 5 milliGRAM(s) Oral daily  buMETAnide 1 milliGRAM(s) Oral two times a day  metoprolol succinate  milliGRAM(s) Oral daily  spironolactone 12.5 milliGRAM(s) Oral daily        PHYSICAL EXAM:  T(C): 36.2 (09-14-22 @ 07:45), Max: 37.1 (09-13-22 @ 23:16)  HR: 73 (09-14-22 @ 07:45) (65 - 73)  BP: 129/57 (09-14-22 @ 07:45) (122/66 - 136/62)  RR: 16 (09-14-22 @ 07:45) (16 - 18)  SpO2: 93% (09-14-22 @ 07:45) (92% - 93%)  Wt(kg): --  I&O's Summary    13 Sep 2022 07:01  -  14 Sep 2022 07:00  --------------------------------------------------------  IN: 450 mL / OUT: 2600 mL / NET: -2150 mL    14 Sep 2022 07:01  -  14 Sep 2022 15:31  --------------------------------------------------------  IN: 560 mL / OUT: 0 mL / NET: 560 mL          Appearance: In no distress	  HEENT:    PERRL, EOMI	  Cardiovascular:  S1 S2, No JVD  Respiratory: Lungs clear to auscultation	  Gastrointestinal:  Soft, Non-tender, + BS	  Vascularature:  No edema of LE  Psychiatric: Appropriate affect   Neuro: no acute focal deficits                               13.2   8.48  )-----------( 284      ( 14 Sep 2022 12:47 )             43.1     09-14    135  |  94<L>  |  55<H>  ----------------------------<  281<H>  3.9   |  26  |  2.03<H>    Ca    9.3      14 Sep 2022 12:47          Labs personally reviewed      ASSESSMENT/PLAN: 	  Patient is an 82yof w/ hx of HTN, hyperlipidemia, type 2 diabetes mellitus, breast cancer status post right partial mastectomy, rectal carcinoma status post resection, anemia, diastolic heart failure, hx of right lower extremity DVT, CAD s/p 2 vessel CABG 11/9/2020 and renal insufficiency who was admitted for volume overload.     Problem/Plan - 1:  ·  Problem: Acute on chronic diastolic heart failure.   ·  Plan: Noncompliance with OP diuretic regimen  Repeat TTE with impaired E/e' c/w increased filling pressures LV and volume overload  -Continues to complain of SOB though improved from admission  + JVD  - BNP significantly decreased (1038->276)  - Repeat CXR pending  - Cr slightly above baseline - most likely d/t diuretics   -C/w PO Bumex 1mg PO BID    Problem/Plan - 2:  ·  Problem: Stage 3 chronic kidney disease.   ·  Plan: Renal FU appreciated  - Cr slightly above baseline - avoid nephrotoxins     Problem/Plan - 3:  ·  Problem: Diabetes mellitus with hyperglycemia.   ·  Plan: A1c on Aug 23, 2022 as 9.6  -Continue Insulin Lantus 16u HS, Lispro 7u TIDwm       Problem/Plan - 4:  ·  Problem: Hyperlipidemia.   ·  Plan: Continue home meds atorvastatin 40mg              MAINE Mancia DO [facility]  Cardiovascular Medicine  [address] [place] [address], [address]  Office: [phone]  Cell: [phone]

## 2022-09-14 NOTE — PROGRESS NOTE ADULT - PROBLEM SELECTOR PLAN 3
GFR 37%  -patient follows [facility] outpatient, consulted [facility]    -sCr worsened in the setting of diuretics   -Diuretics per nephrology recs  -strict Is/Os. Check daily BMP  -avoid nephrotoxic agents  -renally dose all medications    #Hypokalemia: Give K supplement to maintain K >4.

## 2022-09-14 NOTE — PROGRESS NOTE ADULT - ASSESSMENT
ASSESSMENT:  81 yo F wit a PMH of breast CA, DM, HTN, HLD, CHF, chronic RUE lymphedema p/w substernal/epigastric pain, admitted for HF, nephrology consulted for CKD.    -CKD stage 3/4---- base line creatinine 1.5 mg/dl  -Dae -non oliguric ---improving, no on po diuretics  -Lytes controlled  -HTN- blood pressure controlled   -CHF ---volume overload   -acid base----monitor bicarb while on diuretics  -UTI---on abx  -lymphedema    RECOMMENDATION:  -cr slight up trending likely in the view of diuretics   -a/w Bumex 1mg po bid  -continue spironolactone 12.5 mg daily   -keep k > 4 ,mag >2 ,phas >3 while on diuretics.  -monitor bmp   -Avoid nephrotoxins as able  -Dose meds for GFR 30 ml/min  -monitor I/Os  -daily weights  -urology rec appreciated         Thank you for involving [facility] in this patient's care.    With warm regards    Brenda Rader  [facility]  Office: [phone]

## 2022-09-15 LAB
ANION GAP SERPL CALC-SCNC: 14 MMOL/L — SIGNIFICANT CHANGE UP (ref 5–17)
BASOPHILS # BLD AUTO: 0.04 K/UL — SIGNIFICANT CHANGE UP (ref 0–0.2)
BASOPHILS NFR BLD AUTO: 0.5 % — SIGNIFICANT CHANGE UP (ref 0–2)
BUN SERPL-MCNC: 57 MG/DL — HIGH (ref 7–23)
CALCIUM SERPL-MCNC: 9.3 MG/DL — SIGNIFICANT CHANGE UP (ref 8.4–10.5)
CHLORIDE SERPL-SCNC: 98 MMOL/L — SIGNIFICANT CHANGE UP (ref 96–108)
CO2 SERPL-SCNC: 23 MMOL/L — SIGNIFICANT CHANGE UP (ref 22–31)
CREAT SERPL-MCNC: 2.08 MG/DL — HIGH (ref 0.5–1.3)
EGFR: 23 ML/MIN/1.73M2 — LOW
EOSINOPHIL # BLD AUTO: 0.07 K/UL — SIGNIFICANT CHANGE UP (ref 0–0.5)
EOSINOPHIL NFR BLD AUTO: 0.8 % — SIGNIFICANT CHANGE UP (ref 0–6)
GLUCOSE BLDC GLUCOMTR-MCNC: 157 MG/DL — HIGH (ref 70–99)
GLUCOSE BLDC GLUCOMTR-MCNC: 172 MG/DL — HIGH (ref 70–99)
GLUCOSE BLDC GLUCOMTR-MCNC: 182 MG/DL — HIGH (ref 70–99)
GLUCOSE BLDC GLUCOMTR-MCNC: 191 MG/DL — HIGH (ref 70–99)
GLUCOSE SERPL-MCNC: 144 MG/DL — HIGH (ref 70–99)
HCT VFR BLD CALC: 43 % — SIGNIFICANT CHANGE UP (ref 34.5–45)
HGB BLD-MCNC: 13 G/DL — SIGNIFICANT CHANGE UP (ref 11.5–15.5)
IMM GRANULOCYTES NFR BLD AUTO: 0.3 % — SIGNIFICANT CHANGE UP (ref 0–1.5)
LYMPHOCYTES # BLD AUTO: 1.84 K/UL — SIGNIFICANT CHANGE UP (ref 1–3.3)
LYMPHOCYTES # BLD AUTO: 20.9 % — SIGNIFICANT CHANGE UP (ref 13–44)
MCHC RBC-ENTMCNC: 28.4 PG — SIGNIFICANT CHANGE UP (ref 27–34)
MCHC RBC-ENTMCNC: 30.2 GM/DL — LOW (ref 32–36)
MCV RBC AUTO: 93.9 FL — SIGNIFICANT CHANGE UP (ref 80–100)
MONOCYTES # BLD AUTO: 0.78 K/UL — SIGNIFICANT CHANGE UP (ref 0–0.9)
MONOCYTES NFR BLD AUTO: 8.9 % — SIGNIFICANT CHANGE UP (ref 2–14)
NEUTROPHILS # BLD AUTO: 6.03 K/UL — SIGNIFICANT CHANGE UP (ref 1.8–7.4)
NEUTROPHILS NFR BLD AUTO: 68.6 % — SIGNIFICANT CHANGE UP (ref 43–77)
NRBC # BLD: 0 /100 WBCS — SIGNIFICANT CHANGE UP (ref 0–0)
PLATELET # BLD AUTO: 273 K/UL — SIGNIFICANT CHANGE UP (ref 150–400)
POTASSIUM SERPL-MCNC: 4.1 MMOL/L — SIGNIFICANT CHANGE UP (ref 3.5–5.3)
POTASSIUM SERPL-SCNC: 4.1 MMOL/L — SIGNIFICANT CHANGE UP (ref 3.5–5.3)
RBC # BLD: 4.58 M/UL — SIGNIFICANT CHANGE UP (ref 3.8–5.2)
RBC # FLD: 15.2 % — HIGH (ref 10.3–14.5)
SARS-COV-2 RNA SPEC QL NAA+PROBE: SIGNIFICANT CHANGE UP
SODIUM SERPL-SCNC: 135 MMOL/L — SIGNIFICANT CHANGE UP (ref 135–145)
WBC # BLD: 8.79 K/UL — SIGNIFICANT CHANGE UP (ref 3.8–10.5)
WBC # FLD AUTO: 8.79 K/UL — SIGNIFICANT CHANGE UP (ref 3.8–10.5)

## 2022-09-15 PROCEDURE — 99232 SBSQ HOSP IP/OBS MODERATE 35: CPT

## 2022-09-15 RX ORDER — ERTAPENEM SODIUM 1 G/1
500 INJECTION, POWDER, LYOPHILIZED, FOR SOLUTION INTRAMUSCULAR; INTRAVENOUS EVERY 24 HOURS
Refills: 0 | Status: DISCONTINUED | OUTPATIENT
Start: 2022-09-16 | End: 2022-09-16

## 2022-09-15 RX ADMIN — Medication 1: at 16:59

## 2022-09-15 RX ADMIN — LIDOCAINE 1 PATCH: 4 CREAM TOPICAL at 17:04

## 2022-09-15 RX ADMIN — Medication 9 UNIT(S): at 12:27

## 2022-09-15 RX ADMIN — GABAPENTIN 200 MILLIGRAM(S): 400 CAPSULE ORAL at 14:48

## 2022-09-15 RX ADMIN — GABAPENTIN 200 MILLIGRAM(S): 400 CAPSULE ORAL at 22:22

## 2022-09-15 RX ADMIN — FAMOTIDINE 20 MILLIGRAM(S): 10 INJECTION INTRAVENOUS at 12:27

## 2022-09-15 RX ADMIN — Medication 1 APPLICATION(S): at 05:40

## 2022-09-15 RX ADMIN — LIDOCAINE 1 PATCH: 4 CREAM TOPICAL at 04:00

## 2022-09-15 RX ADMIN — AMLODIPINE BESYLATE 5 MILLIGRAM(S): 2.5 TABLET ORAL at 05:27

## 2022-09-15 RX ADMIN — BUMETANIDE 1 MILLIGRAM(S): 0.25 INJECTION INTRAMUSCULAR; INTRAVENOUS at 05:31

## 2022-09-15 RX ADMIN — POLYETHYLENE GLYCOL 3350 17 GRAM(S): 17 POWDER, FOR SOLUTION ORAL at 12:26

## 2022-09-15 RX ADMIN — Medication 81 MILLIGRAM(S): at 12:27

## 2022-09-15 RX ADMIN — Medication 150 MILLIGRAM(S): at 05:27

## 2022-09-15 RX ADMIN — ERTAPENEM SODIUM 100 MILLIGRAM(S): 1 INJECTION, POWDER, LYOPHILIZED, FOR SOLUTION INTRAMUSCULAR; INTRAVENOUS at 07:00

## 2022-09-15 RX ADMIN — Medication 9 UNIT(S): at 08:02

## 2022-09-15 RX ADMIN — APIXABAN 2.5 MILLIGRAM(S): 2.5 TABLET, FILM COATED ORAL at 17:05

## 2022-09-15 RX ADMIN — APIXABAN 2.5 MILLIGRAM(S): 2.5 TABLET, FILM COATED ORAL at 05:26

## 2022-09-15 RX ADMIN — LIDOCAINE 1 PATCH: 4 CREAM TOPICAL at 19:23

## 2022-09-15 RX ADMIN — Medication 1: at 12:26

## 2022-09-15 RX ADMIN — GABAPENTIN 200 MILLIGRAM(S): 400 CAPSULE ORAL at 05:26

## 2022-09-15 RX ADMIN — INSULIN GLARGINE 20 UNIT(S): 100 INJECTION, SOLUTION SUBCUTANEOUS at 22:17

## 2022-09-15 RX ADMIN — Medication 9 UNIT(S): at 17:00

## 2022-09-15 RX ADMIN — Medication 1 APPLICATION(S): at 17:38

## 2022-09-15 RX ADMIN — SPIRONOLACTONE 12.5 MILLIGRAM(S): 25 TABLET, FILM COATED ORAL at 05:26

## 2022-09-15 RX ADMIN — Medication 1: at 08:01

## 2022-09-15 RX ADMIN — BUMETANIDE 1 MILLIGRAM(S): 0.25 INJECTION INTRAMUSCULAR; INTRAVENOUS at 14:48

## 2022-09-15 RX ADMIN — ATORVASTATIN CALCIUM 40 MILLIGRAM(S): 80 TABLET, FILM COATED ORAL at 22:22

## 2022-09-15 RX ADMIN — LIDOCAINE 1 PATCH: 4 CREAM TOPICAL at 05:39

## 2022-09-15 NOTE — PROGRESS NOTE ADULT - PROBLEM SELECTOR PLAN 2
-CT showing "Suggestion of chronic and severe right-sided hydronephrosis as on 11/3/2020 CT chest"  -Renal u/s with chronic severe R sided hydronephrosis  >      >Renal Lasix scan done: No evidence of a functioning right kidney > Re-consulted urology team for further recs, they are aware and recommend follow-up with Dr. Montenegro outpatient.   -Monitor bladder scans for PVR. Chen placed for Lasix scan, remove chen and TOV 9/15.    #UTI: patient complaining of urinary symptoms and UA + with urine culture grew e coli >100k,  now sensitivities showing ESBL   -Started Ceftriaxone 9/10, Switched to Ertapenem 9/12 x 5days

## 2022-09-15 NOTE — PROGRESS NOTE ADULT - PROBLEM SELECTOR PLAN 6
diet: CC + DASH  dvt ppx: hx of dvt, apixaban 2.5 BID  Bowel regimen: miralax qd. monitor for constipation       Discharge planning likely 9/16 pending improvement in renal function    For Follow-up outpatient:  1. Follow-up with urologist Dr. Montenegro outpatient.   2. Follow up with cardiologist Dr. Hagan   3. Follow up with nephrologist

## 2022-09-15 NOTE — PROGRESS NOTE ADULT - SUBJECTIVE AND OBJECTIVE BOX
Chief complaint  Patient is a 82y old  Female who presents with a chief complaint of CHF (12 Sep 2022 17:19)   Review of systems  Patient in bed, looks comfortable, no hypoglycemic episodes.    Labs and Fingersticks  CAPILLARY BLOOD GLUCOSE      POCT Blood Glucose.: 182 mg/dL (15 Sep 2022 16:35)  POCT Blood Glucose.: 191 mg/dL (15 Sep 2022 12:08)  POCT Blood Glucose.: 172 mg/dL (15 Sep 2022 07:40)  POCT Blood Glucose.: 194 mg/dL (14 Sep 2022 21:20)  POCT Blood Glucose.: 161 mg/dL (14 Sep 2022 16:42)      Anion Gap, Serum: 14 (09-15 @ 07:21)  Anion Gap, Serum: 15 (09-14 @ 12:47)      Calcium, Total Serum: 9.3 (09-15 @ 07:21)  Calcium, Total Serum: 9.3 (09-14 @ 12:47)          09-15    135  |  98  |  57<H>  ----------------------------<  144<H>  4.1   |  23  |  2.08<H>    Ca    9.3      15 Sep 2022 07:21                          13.0   8.79  )-----------( 273      ( 15 Sep 2022 07:21 )             43.0     Medications  MEDICATIONS  (STANDING):  amLODIPine   Tablet 5 milliGRAM(s) Oral daily  apixaban 2.5 milliGRAM(s) Oral two times a day  aspirin  chewable 81 milliGRAM(s) Oral daily  atorvastatin 40 milliGRAM(s) Oral at bedtime  BACItracin   Ointment 1 Application(s) Topical two times a day  buMETAnide 1 milliGRAM(s) Oral two times a day  dextrose 5%. 1000 milliLiter(s) (100 mL/Hr) IV Continuous <Continuous>  dextrose 5%. 1000 milliLiter(s) (50 mL/Hr) IV Continuous <Continuous>  dextrose 50% Injectable 25 Gram(s) IV Push once  dextrose 50% Injectable 12.5 Gram(s) IV Push once  dextrose 50% Injectable 25 Gram(s) IV Push once  famotidine    Tablet 20 milliGRAM(s) Oral daily  gabapentin 200 milliGRAM(s) Oral three times a day  glucagon  Injectable 1 milliGRAM(s) IntraMuscular once  insulin glargine Injectable (LANTUS) 20 Unit(s) SubCutaneous at bedtime  insulin lispro (ADMELOG) corrective regimen sliding scale   SubCutaneous three times a day before meals  insulin lispro (ADMELOG) corrective regimen sliding scale   SubCutaneous at bedtime  insulin lispro Injectable (ADMELOG) 9 Unit(s) SubCutaneous three times a day before meals  lidocaine   4% Patch 1 Patch Transdermal every 24 hours  lidocaine   4% Patch 1 Patch Transdermal every 24 hours  metoprolol succinate  milliGRAM(s) Oral daily  polyethylene glycol 3350 17 Gram(s) Oral daily  spironolactone 12.5 milliGRAM(s) Oral daily      Physical Exam  General: Patient comfortable in bed  Vital Signs Last 12 Hrs  T(F): 97.4 (09-15-22 @ 16:26), Max: 97.5 (09-15-22 @ 08:11)  HR: 63 (09-15-22 @ 16:26) (63 - 66)  BP: 119/59 (09-15-22 @ 16:26) (119/59 - 128/83)  BP(mean): --  RR: 18 (09-15-22 @ 16:26) (16 - 18)  SpO2: 96% (09-15-22 @ 16:26) (95% - 96%)  Neck: No palpable thyroid nodules.  CVS: S1S2, No murmurs  Respiratory: No wheezing, no crepitations  GI: Abdomen soft, bowel sounds positive  Musculoskeletal:  edema lower extremities.   Skin: No skin rashes, no ecchymosis    Diagnostics             Chief complaint  Patient is a 82y old  Female who presents with a chief complaint of CHF (12 Sep 2022 17:19)   Review of systems  Patient in bed, looks comfortable, no hypoglycemic episodes.    Labs and Fingersticks  CAPILLARY BLOOD GLUCOSE      POCT Blood Glucose.: 182 mg/dL (15 Sep 2022 16:35)  POCT Blood Glucose.: 191 mg/dL (15 Sep 2022 12:08)  POCT Blood Glucose.: 172 mg/dL (15 Sep 2022 07:40)  POCT Blood Glucose.: 194 mg/dL (14 Sep 2022 21:20)  POCT Blood Glucose.: 161 mg/dL (14 Sep 2022 16:42)      Anion Gap, Serum: 14 (09-15 @ 07:21)  Anion Gap, Serum: 15 (09-14 @ 12:47)      Calcium, Total Serum: 9.3 (09-15 @ 07:21)  Calcium, Total Serum: 9.3 (09-14 @ 12:47)          09-15    135  |  98  |  57<H>  ----------------------------<  144<H>  4.1   |  23  |  2.08<H>    Ca    9.3      15 Sep 2022 07:21                          13.0   8.79  )-----------( 273      ( 15 Sep 2022 07:21 )             43.0     Medications  MEDICATIONS  (STANDING):  amLODIPine   Tablet 5 milliGRAM(s) Oral daily  apixaban 2.5 milliGRAM(s) Oral two times a day  aspirin  chewable 81 milliGRAM(s) Oral daily  atorvastatin 40 milliGRAM(s) Oral at bedtime  BACItracin   Ointment 1 Application(s) Topical two times a day  buMETAnide 1 milliGRAM(s) Oral two times a day  dextrose 5%. 1000 milliLiter(s) (100 mL/Hr) IV Continuous <Continuous>  dextrose 5%. 1000 milliLiter(s) (50 mL/Hr) IV Continuous <Continuous>  dextrose 50% Injectable 25 Gram(s) IV Push once  dextrose 50% Injectable 12.5 Gram(s) IV Push once  dextrose 50% Injectable 25 Gram(s) IV Push once  famotidine    Tablet 20 milliGRAM(s) Oral daily  gabapentin 200 milliGRAM(s) Oral three times a day  glucagon  Injectable 1 milliGRAM(s) IntraMuscular once  insulin glargine Injectable (LANTUS) 20 Unit(s) SubCutaneous at bedtime  insulin lispro (ADMELOG) corrective regimen sliding scale   SubCutaneous three times a day before meals  insulin lispro (ADMELOG) corrective regimen sliding scale   SubCutaneous at bedtime  insulin lispro Injectable (ADMELOG) 9 Unit(s) SubCutaneous three times a day before meals  lidocaine   4% Patch 1 Patch Transdermal every 24 hours  lidocaine   4% Patch 1 Patch Transdermal every 24 hours  metoprolol succinate  milliGRAM(s) Oral daily  polyethylene glycol 3350 17 Gram(s) Oral daily  spironolactone 12.5 milliGRAM(s) Oral daily      Physical Exam  General: Patient comfortable in bed  Vital Signs Last 12 Hrs  T(F): 97.4 (09-15-22 @ 16:26), Max: 97.5 (09-15-22 @ 08:11)  HR: 63 (09-15-22 @ 16:26) (63 - 66)  BP: 119/59 (09-15-22 @ 16:26) (119/59 - 128/83)  BP(mean): --  RR: 18 (09-15-22 @ 16:26) (16 - 18)  SpO2: 96% (09-15-22 @ 16:26) (95% - 96%)  Neck: No palpable thyroid nodules.  CVS: S1S2, No murmurs  Respiratory: No wheezing, no crepitations  GI: Abdomen soft, bowel sounds positive  Musculoskeletal:  edema lower extremities.   Skin: No skin rashes, no ecchymosis    Diagnostics

## 2022-09-15 NOTE — PROGRESS NOTE ADULT - SUBJECTIVE AND OBJECTIVE BOX
[facility] Division of Hospital Medicine  Margy Almaraz MD M-F, 8A-5P: MS Teams, Pager: [phone]  Other Times: Ext: [phone], Pager: [phone]      Patient is a 82y old  Female who presents with a chief complaint of CHF (12 Sep 2022 17:19)      SUBJECTIVE / OVERNIGHT EVENTS:  Patient was examined this morning. She is reporting some dyspnea with ambulation, her back pain is improved. Continued to have left arm pain. Rest of the 10 point ROS neg.       ADDITIONAL REVIEW OF SYSTEMS:    MEDICATIONS  (STANDING):  amLODIPine   Tablet 5 milliGRAM(s) Oral daily  apixaban 2.5 milliGRAM(s) Oral two times a day  aspirin  chewable 81 milliGRAM(s) Oral daily  atorvastatin 40 milliGRAM(s) Oral at bedtime  BACItracin   Ointment 1 Application(s) Topical two times a day  buMETAnide 1 milliGRAM(s) Oral two times a day  dextrose 5%. 1000 milliLiter(s) (50 mL/Hr) IV Continuous <Continuous>  dextrose 5%. 1000 milliLiter(s) (100 mL/Hr) IV Continuous <Continuous>  dextrose 50% Injectable 25 Gram(s) IV Push once  dextrose 50% Injectable 12.5 Gram(s) IV Push once  dextrose 50% Injectable 25 Gram(s) IV Push once  famotidine    Tablet 20 milliGRAM(s) Oral daily  gabapentin 200 milliGRAM(s) Oral three times a day  glucagon  Injectable 1 milliGRAM(s) IntraMuscular once  insulin glargine Injectable (LANTUS) 20 Unit(s) SubCutaneous at bedtime  insulin lispro (ADMELOG) corrective regimen sliding scale   SubCutaneous three times a day before meals  insulin lispro (ADMELOG) corrective regimen sliding scale   SubCutaneous at bedtime  insulin lispro Injectable (ADMELOG) 9 Unit(s) SubCutaneous three times a day before meals  lidocaine   4% Patch 1 Patch Transdermal every 24 hours  lidocaine   4% Patch 1 Patch Transdermal every 24 hours  metoprolol succinate  milliGRAM(s) Oral daily  polyethylene glycol 3350 17 Gram(s) Oral daily  spironolactone 12.5 milliGRAM(s) Oral daily    MEDICATIONS  (PRN):  dextrose Oral Gel 15 Gram(s) Oral once PRN Blood Glucose LESS THAN 70 milliGRAM(s)/deciliter  melatonin 3 milliGRAM(s) Oral at bedtime PRN Insomnia      CAPILLARY BLOOD GLUCOSE      POCT Blood Glucose.: 191 mg/dL (15 Sep 2022 12:08)  POCT Blood Glucose.: 172 mg/dL (15 Sep 2022 07:40)  POCT Blood Glucose.: 194 mg/dL (14 Sep 2022 21:20)  POCT Blood Glucose.: 161 mg/dL (14 Sep 2022 16:42)      I&O's Summary    14 Sep 2022 07:01  -  15 Sep 2022 07:00  --------------------------------------------------------  IN: 660 mL / OUT: 1750 mL / NET: -1090 mL    15 Sep 2022 07:01  -  15 Sep 2022 12:59  --------------------------------------------------------  IN: 0 mL / OUT: 800 mL / NET: -800 mL        Daily     Daily     PHYSICAL EXAM:  Vital Signs Last 24 Hrs  T(C): 36.4 (15 Sep 2022 08:11), Max: 36.5 (14 Sep 2022 15:42)  T(F): 97.5 (15 Sep 2022 08:11), Max: 97.7 (14 Sep 2022 15:42)  HR: 65 (15 Sep 2022 08:11) (65 - 69)  BP: 120/55 (15 Sep 2022 08:11) (106/76 - 128/83)  BP(mean): --  RR: 16 (15 Sep 2022 08:11) (16 - 18)  SpO2: 95% (15 Sep 2022 08:11) (94% - 95%)    Parameters below as of 15 Sep 2022 08:11  Patient On (Oxygen Delivery Method): room air      CONSTITUTIONAL: NAD, well-developed  EYES: PERRLA; conjunctiva and sclera clear  ENMT: Moist oral mucosa,  NECK: Supple  RESPIRATORY: Normal respiratory effort; lungs are clear to auscultation bilaterally  CARDIOVASCULAR: Regular rate and rhythm, normal S1 and S2, no murmur/rub/gallop;   +Mild pedal edema; +Right UE edema: chronic per patient. Peripheral pulses are 2+ bilaterally.   ABDOMEN: Nontender to palpation, normoactive bowel sounds, no rebound/guarding;   BACK: no back tenderness on exam  MUSCULOSKELETAL:  no clubbing or cyanosis of digits; no joint swelling or tenderness to palpation, moving all extremities   PSYCH: A+O to person, place, and time; affect appropriate  SKIN: chronic BL LE venous stasis ulcerations       LABS:                        13.0   8.79  )-----------( 273      ( 15 Sep 2022 07:21 )             43.0     09-15    135  |  98  |  57<H>  ----------------------------<  144<H>  4.1   |  23  |  2.08<H>    Ca    9.3      15 Sep 2022 07:21                  SARS-CoV-2: NotDetec (07 Sep 2022 19:04)  COVID-19 PCR: NotDetec (07 Sep 2022 12:16)      RADIOLOGY & ADDITIONAL TESTS:  Results Reviewed:   Imaging Personally Reviewed:  Electrocardiogram Personally Reviewed:    COORDINATION OF CARE:  Care Discussed with Consultants/Other Providers [Y/N]:  Prior or Outpatient Records Reviewed [Y/N]:

## 2022-09-15 NOTE — PROGRESS NOTE ADULT - PROBLEM SELECTOR PLAN 3
GFR 37%  -patient follows [facility] outpatient, consulted [facility]    -sCr worsened in the setting of diuretics   -strict Is/Os. Check daily BMP  -avoid nephrotoxic agents  -renally dose all medications    #Hypokalemia: Give K supplement to maintain K >4.

## 2022-09-15 NOTE — PROGRESS NOTE ADULT - SUBJECTIVE AND OBJECTIVE BOX
Patient seen and examined, reports feeling ok, comfortable on room air, currently in no acute distress.   UOP good   still having dyspnea on walking     MEDICATIONS  (STANDING):  amLODIPine   Tablet 5 milliGRAM(s) Oral daily  apixaban 2.5 milliGRAM(s) Oral two times a day  aspirin  chewable 81 milliGRAM(s) Oral daily  atorvastatin 40 milliGRAM(s) Oral at bedtime  BACItracin   Ointment 1 Application(s) Topical two times a day  buMETAnide 1 milliGRAM(s) Oral two times a day  dextrose 5%. 1000 milliLiter(s) (50 mL/Hr) IV Continuous <Continuous>  dextrose 5%. 1000 milliLiter(s) (100 mL/Hr) IV Continuous <Continuous>  dextrose 50% Injectable 25 Gram(s) IV Push once  dextrose 50% Injectable 12.5 Gram(s) IV Push once  dextrose 50% Injectable 25 Gram(s) IV Push once  ertapenem  IVPB 500 milliGRAM(s) IV Intermittent every 24 hours  ertapenem  IVPB      famotidine    Tablet 20 milliGRAM(s) Oral daily  gabapentin 200 milliGRAM(s) Oral three times a day  glucagon  Injectable 1 milliGRAM(s) IntraMuscular once  insulin glargine Injectable (LANTUS) 18 Unit(s) SubCutaneous at bedtime  insulin lispro (ADMELOG) corrective regimen sliding scale   SubCutaneous three times a day before meals  insulin lispro (ADMELOG) corrective regimen sliding scale   SubCutaneous at bedtime  insulin lispro Injectable (ADMELOG) 8 Unit(s) SubCutaneous three times a day before meals  lidocaine   4% Patch 1 Patch Transdermal every 24 hours  metoprolol succinate  milliGRAM(s) Oral daily  polyethylene glycol 3350 17 Gram(s) Oral daily  spironolactone 12.5 milliGRAM(s) Oral daily    VITALS:  T(C): , Max: 36.5 (09-12-22 @ 16:25)  T(F): , Max: 97.7 (09-12-22 @ 16:25)  HR: 65 (09-13-22 @ 09:19)  BP: 125/74 (09-13-22 @ 09:19)  RR: 18 (09-13-22 @ 09:19)  SpO2: 96% (09-13-22 @ 09:19)    I and O's:    09-12 @ 07:01 - 09-13 @ 07:00  --------------------------------------------------------  IN: 400 mL / OUT: 1800 mL / NET: -1400 mL    PHYSICAL EXAM:  General: Alerted, oriented  HEENT: MMM  Lungs: diminished at bases  Heart: RRR S1S2  Abdomen: Soft, NTND  Ext:   edema b/l  : no chen    LABS:                        13.0   8.86  )-----------( 270      ( 13 Sep 2022 07:28 )             42.9     09-13    138  |  98  |  52<H>  ----------------------------<  200<H>  4.4   |  27  |  1.85<H>    Ca    9.3      13 Sep 2022 07:28  Phos  3.7     09-12  Mg     1.9     09-12    RADIOLOGIC STUDIES:         ACC: [id number] EXAM:  NM KIDNEY IMG LASIX                          PROCEDURE DATE:  09/13/2022          INTERPRETATION:  RADIOPHARMACEUTICAL: 10.2 mCi   [id number] (MAG3), I.V.    CLINICAL STATEMENT: 82-year-old female with history of breast cancer   presents with chronic severe right hydronephrosis.    TECHNIQUE: Patient came with indwelling Chen catheter in place. Patient   was hydrated with 850 mL of normal saline solution intravenously. Dynamic   images of the posterior abdomen were obtained for 49 minutes following   administration of radiopharmaceutical.  Lasix 40 mg I.V. was administered   at 26 minutes post-injection.    FINDINGS:  The renal perfusion images delayed and diminished flow to the   left kidney and nodiscernible flow to the right renal bed.    The functional images show good cortical uptake of radiotracer by the   left kidney. There is prompt appearance of activity in the left   collecting system. There is spontaneous drainage of activity from the   left collecting system prior to Lasix. There is mild cortical retention   of activity at the end of the study suggestive of mildly impaired renal   function.    There is no evidence of a functioning right kidney.    IMPRESSION: Abnormal diuretic renal scan.    Mildly diminished flow to and function of the left kidney with no   evidence of obstruction.    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    --- End of Report --      FRITZ TEMPLE MD; Attending Nuclear Medicine  This document has been electronically signed. Sep 13 2022  3:42PM

## 2022-09-15 NOTE — PROGRESS NOTE ADULT - SUBJECTIVE AND OBJECTIVE BOX
DATE OF SERVICE: 09-15-22 @ 14:41    Patient is a 82y old  Female who presents with a chief complaint of CHF (12 Sep 2022 17:19)      INTERVAL HISTORY: Feels ok.     REVIEW OF SYSTEMS:  CONSTITUTIONAL: No weakness  EYES/ENT: No visual changes;  No throat pain   NECK: No pain or stiffness  RESPIRATORY: No cough, wheezing; No shortness of breath  CARDIOVASCULAR: No chest pain or palpitations  GASTROINTESTINAL: No abdominal  pain. No nausea, vomiting, or hematemesis  GENITOURINARY: No dysuria, frequency or hematuria  NEUROLOGICAL: No stroke like symptoms  SKIN: No rashes    	  MEDICATIONS:  amLODIPine   Tablet 5 milliGRAM(s) Oral daily  buMETAnide 1 milliGRAM(s) Oral two times a day  metoprolol succinate  milliGRAM(s) Oral daily  spironolactone 12.5 milliGRAM(s) Oral daily        PHYSICAL EXAM:  T(C): 36.4 (09-15-22 @ 08:11), Max: 36.5 (09-14-22 @ 15:42)  HR: 65 (09-15-22 @ 08:11) (65 - 69)  BP: 120/55 (09-15-22 @ 08:11) (106/76 - 128/83)  RR: 16 (09-15-22 @ 08:11) (16 - 18)  SpO2: 95% (09-15-22 @ 08:11) (94% - 95%)  Wt(kg): --  I&O's Summary    14 Sep 2022 07:01  -  15 Sep 2022 07:00  --------------------------------------------------------  IN: 660 mL / OUT: 1750 mL / NET: -1090 mL    15 Sep 2022 07:01  -  15 Sep 2022 14:41  --------------------------------------------------------  IN: 0 mL / OUT: 800 mL / NET: -800 mL          Appearance: In no distress	  HEENT:    PERRL, EOMI	  Cardiovascular:  S1 S2, No JVD  Respiratory: Lungs clear to auscultation	  Gastrointestinal:  Soft, Non-tender, + BS	  Vascularature:  No edema of LE  Psychiatric: Appropriate affect   Neuro: no acute focal deficits                               13.0   8.79  )-----------( 273      ( 15 Sep 2022 07:21 )             43.0     09-15    135  |  98  |  57<H>  ----------------------------<  144<H>  4.1   |  23  |  2.08<H>    Ca    9.3      15 Sep 2022 07:21          Labs personally reviewed      ASSESSMENT/PLAN: 	    Patient is an 82yof w/ hx of HTN, hyperlipidemia, type 2 diabetes mellitus, breast cancer status post right partial mastectomy, rectal carcinoma status post resection, anemia, diastolic heart failure, hx of right lower extremity DVT, CAD s/p 2 vessel CABG 11/9/2020 and renal insufficiency who was admitted for volume overload.     Problem/Plan - 1:  ·  Problem: Acute on chronic diastolic heart failure.   ·  Plan: Noncompliance with OP diuretic regimen  Repeat TTE with impaired E/e' c/w increased filling pressures LV and volume overload  -Continues to complain of SOB though improved from admission  + JVD  - BNP significantly decreased (1038->276)  - Repeat CXR pending  - Cr slightly above baseline - most likely d/t diuretics   -C/w PO Bumex 1mg PO BID    Problem/Plan - 2:  ·  Problem: Stage 3 chronic kidney disease.   ·  Plan: Renal FU appreciated  - Cr slightly above baseline - avoid nephrotoxins   - Renal following    Problem/Plan - 3:  ·  Problem: Diabetes mellitus with hyperglycemia.   ·  Plan: A1c on Aug 23, 2022 as 9.6  -Continue Insulin Lantus 16u HS, Lispro 7u TIDwm       Problem/Plan - 4:  ·  Problem: Hyperlipidemia.   ·  Plan: Continue home meds atorvastatin 40mg            FABIOLA Smith-SEE Winchester DO [facility]  Cardiovascular Medicine  [address] [place] [address], [address]  Office: [phone]  Cell: [phone]

## 2022-09-15 NOTE — PROGRESS NOTE ADULT - PROBLEM SELECTOR PLAN 5
Continue home meds atorvastatin 40, asa 81      #Back pain:   Evaluation for UTI and hydronephrosis as above  Suspect MSK element. Continue lidocaine patches  Continue PT as tolerated. discussed with PT, encourage OOBTC during the day    #Left arm pain: left shoulder x-ray without acute pathology, left upper extremity duplex US showed superficial thrombus: Warm packs for comfort.

## 2022-09-15 NOTE — PROGRESS NOTE ADULT - ASSESSMENT
ASSESSMENT:  81 yo F wit a PMH of breast CA, DM, HTN, HLD, CHF, chronic RUE lymphedema p/w substernal/epigastric pain, admitted for HF, nephrology consulted for CKD.    -CKD stage 3/4---- base line creatinine 1.5 mg/dl  -Dae -non oliguric ---improving, no on po diuretics  -Lytes controlled  -HTN- blood pressure controlled   -CHF ---volume overload   -acid base----monitor bicarb while on diuretics  -UTI---on abx  -lymphedema    RECOMMENDATION:  -cr cont to  up trend  likely in the view of diuretics   -cont diuretics   -con  Bumex 1mg po bid   -continue spironolactone 12.5 mg daily.  -keep k > 4 ,mag >2 ,phas >3 while on diuretics.  -monitor bmp   -Avoid nephrotoxins as able  -Dose meds for GFR 30 ml/min  -monitor I/Os  -daily weights  -urology rec appreciated         Thank you for involving [facility] in this patient's care.    With warm regards    Brenda Rader  [facility]  Office: [phone]

## 2022-09-15 NOTE — PROGRESS NOTE ADULT - PROBLEM SELECTOR PLAN 4
A1c on Aug 23, 2022 (outpatient records) was 9.6    -Continue gabapentin for neuropathy (pt takes ER, but while inpatient continue 200 TID)  -Continue Insulin Lantus 20u HS, Lispro 9u TIDwm  -Monitor fingerstick glu checks, goal 100-180  -ISS  -Nutritionist eval  >     >For discharge: DM home regimen per Endo team: -Prandin 1mg TID before meals and Tradjenta 5mg daily  -Discontinue prior hypoglycemic agents : Januvia and glimepiride. -F/U outpatient in 4 weeks

## 2022-09-15 NOTE — PROGRESS NOTE ADULT - ASSESSMENT
Assessment  DMT2: 82y Female with DM T2 with hyperglycemia, A1C 9.4%, was on oral meds at home, now on basal bolus insulin, increased dose yesterday, blood sugars fluctuating/overall improving, no hypoglycemic episodes, eating meals, appears comfortable, for possible DC tomorrow.  CHF: on medications, no chest pain, stable, monitored.  HTN: Controlled,  on antihypertensive medications.  CKD: Monitor labs/BMP,   Obesity: No strict exercise routines, not on any weight loss program, neither on low calorie diet.        Dior Monroy MD  Cell: [phone]  Office: [phone]     Assessment  DMT2: 82y Female with DM T2 with hyperglycemia, A1C 9.4%, was on oral meds at home, now on basal bolus insulin, increased dose yesterday, blood sugars  fluctuating/overall improving, no hypoglycemic episodes, eating meals, appears comfortable, for possible DC tomorrow.  CHF: on medications, no chest pain, stable, monitored.  HTN: Controlled,  on antihypertensive medications.  CKD: Monitor labs/BMP,   Obesity: No strict exercise routines, not on any weight loss program, neither on low calorie diet.        Dior Monroy MD  Cell: [phone]  Office: [phone]

## 2022-09-16 ENCOUNTER — TRANSCRIPTION ENCOUNTER (OUTPATIENT)
Age: 82
End: 2022-09-16

## 2022-09-16 VITALS
TEMPERATURE: 98 F | DIASTOLIC BLOOD PRESSURE: 69 MMHG | RESPIRATION RATE: 18 BRPM | HEART RATE: 70 BPM | SYSTOLIC BLOOD PRESSURE: 132 MMHG | OXYGEN SATURATION: 95 %

## 2022-09-16 LAB
ANION GAP SERPL CALC-SCNC: 13 MMOL/L — SIGNIFICANT CHANGE UP (ref 5–17)
BUN SERPL-MCNC: 57 MG/DL — HIGH (ref 7–23)
CALCIUM SERPL-MCNC: 9.2 MG/DL — SIGNIFICANT CHANGE UP (ref 8.4–10.5)
CHLORIDE SERPL-SCNC: 98 MMOL/L — SIGNIFICANT CHANGE UP (ref 96–108)
CO2 SERPL-SCNC: 25 MMOL/L — SIGNIFICANT CHANGE UP (ref 22–31)
CREAT SERPL-MCNC: 2.05 MG/DL — HIGH (ref 0.5–1.3)
EGFR: 23 ML/MIN/1.73M2 — LOW
GLUCOSE BLDC GLUCOMTR-MCNC: 173 MG/DL — HIGH (ref 70–99)
GLUCOSE BLDC GLUCOMTR-MCNC: 189 MG/DL — HIGH (ref 70–99)
GLUCOSE BLDC GLUCOMTR-MCNC: 269 MG/DL — HIGH (ref 70–99)
GLUCOSE SERPL-MCNC: 135 MG/DL — HIGH (ref 70–99)
POTASSIUM SERPL-MCNC: 4.3 MMOL/L — SIGNIFICANT CHANGE UP (ref 3.5–5.3)
POTASSIUM SERPL-SCNC: 4.3 MMOL/L — SIGNIFICANT CHANGE UP (ref 3.5–5.3)
SODIUM SERPL-SCNC: 136 MMOL/L — SIGNIFICANT CHANGE UP (ref 135–145)

## 2022-09-16 PROCEDURE — 80048 BASIC METABOLIC PNL TOTAL CA: CPT

## 2022-09-16 PROCEDURE — 87086 URINE CULTURE/COLONY COUNT: CPT

## 2022-09-16 PROCEDURE — 97110 THERAPEUTIC EXERCISES: CPT

## 2022-09-16 PROCEDURE — 36415 COLL VENOUS BLD VENIPUNCTURE: CPT

## 2022-09-16 PROCEDURE — 85014 HEMATOCRIT: CPT

## 2022-09-16 PROCEDURE — 87635 SARS-COV-2 COVID-19 AMP PRB: CPT

## 2022-09-16 PROCEDURE — U0005: CPT

## 2022-09-16 PROCEDURE — 97165 OT EVAL LOW COMPLEX 30 MIN: CPT

## 2022-09-16 PROCEDURE — 85025 COMPLETE CBC W/AUTO DIFF WBC: CPT

## 2022-09-16 PROCEDURE — 84156 ASSAY OF PROTEIN URINE: CPT

## 2022-09-16 PROCEDURE — C8929: CPT

## 2022-09-16 PROCEDURE — 82330 ASSAY OF CALCIUM: CPT

## 2022-09-16 PROCEDURE — 80053 COMPREHEN METABOLIC PANEL: CPT

## 2022-09-16 PROCEDURE — 97535 SELF CARE MNGMENT TRAINING: CPT

## 2022-09-16 PROCEDURE — 97116 GAIT TRAINING THERAPY: CPT

## 2022-09-16 PROCEDURE — 82570 ASSAY OF URINE CREATININE: CPT

## 2022-09-16 PROCEDURE — 0225U NFCT DS DNA&RNA 21 SARSCOV2: CPT

## 2022-09-16 PROCEDURE — 82962 GLUCOSE BLOOD TEST: CPT

## 2022-09-16 PROCEDURE — 84295 ASSAY OF SERUM SODIUM: CPT

## 2022-09-16 PROCEDURE — 99239 HOSP IP/OBS DSCHRG MGMT >30: CPT

## 2022-09-16 PROCEDURE — 85018 HEMOGLOBIN: CPT

## 2022-09-16 PROCEDURE — 84484 ASSAY OF TROPONIN QUANT: CPT

## 2022-09-16 PROCEDURE — 83605 ASSAY OF LACTIC ACID: CPT

## 2022-09-16 PROCEDURE — 71250 CT THORAX DX C-: CPT

## 2022-09-16 PROCEDURE — 73030 X-RAY EXAM OF SHOULDER: CPT

## 2022-09-16 PROCEDURE — 82947 ASSAY GLUCOSE BLOOD QUANT: CPT

## 2022-09-16 PROCEDURE — 78708 K FLOW/FUNCT IMAGE W/DRUG: CPT

## 2022-09-16 PROCEDURE — 71045 X-RAY EXAM CHEST 1 VIEW: CPT

## 2022-09-16 PROCEDURE — 83880 ASSAY OF NATRIURETIC PEPTIDE: CPT

## 2022-09-16 PROCEDURE — 97161 PT EVAL LOW COMPLEX 20 MIN: CPT

## 2022-09-16 PROCEDURE — 84132 ASSAY OF SERUM POTASSIUM: CPT

## 2022-09-16 PROCEDURE — 87186 SC STD MICRODIL/AGAR DIL: CPT

## 2022-09-16 PROCEDURE — 81001 URINALYSIS AUTO W/SCOPE: CPT

## 2022-09-16 PROCEDURE — 84443 ASSAY THYROID STIM HORMONE: CPT

## 2022-09-16 PROCEDURE — 99285 EMERGENCY DEPT VISIT HI MDM: CPT

## 2022-09-16 PROCEDURE — 83036 HEMOGLOBIN GLYCOSYLATED A1C: CPT

## 2022-09-16 PROCEDURE — 85027 COMPLETE CBC AUTOMATED: CPT

## 2022-09-16 PROCEDURE — 84100 ASSAY OF PHOSPHORUS: CPT

## 2022-09-16 PROCEDURE — 93971 EXTREMITY STUDY: CPT

## 2022-09-16 PROCEDURE — 84439 ASSAY OF FREE THYROXINE: CPT

## 2022-09-16 PROCEDURE — U0003: CPT

## 2022-09-16 PROCEDURE — 82803 BLOOD GASES ANY COMBINATION: CPT

## 2022-09-16 PROCEDURE — 83735 ASSAY OF MAGNESIUM: CPT

## 2022-09-16 PROCEDURE — 76770 US EXAM ABDO BACK WALL COMP: CPT

## 2022-09-16 PROCEDURE — 82435 ASSAY OF BLOOD CHLORIDE: CPT

## 2022-09-16 PROCEDURE — 71046 X-RAY EXAM CHEST 2 VIEWS: CPT

## 2022-09-16 PROCEDURE — A9562: CPT

## 2022-09-16 PROCEDURE — 97530 THERAPEUTIC ACTIVITIES: CPT

## 2022-09-16 RX ORDER — GLIMEPIRIDE 1 MG
1 TABLET ORAL
Qty: 0 | Refills: 0 | DISCHARGE

## 2022-09-16 RX ORDER — LINAGLIPTIN 5 MG/1
1 TABLET, FILM COATED ORAL
Qty: 30 | Refills: 0
Start: 2022-09-16 | End: 2022-10-15

## 2022-09-16 RX ORDER — BUMETANIDE 0.25 MG/ML
1 INJECTION INTRAMUSCULAR; INTRAVENOUS
Qty: 0 | Refills: 0 | DISCHARGE
Start: 2022-09-16 | End: 2022-10-15

## 2022-09-16 RX ORDER — LIDOCAINE 4 G/100G
1 CREAM TOPICAL
Qty: 5 | Refills: 0
Start: 2022-09-16 | End: 2022-09-20

## 2022-09-16 RX ORDER — BUMETANIDE 0.25 MG/ML
1 INJECTION INTRAMUSCULAR; INTRAVENOUS
Qty: 60 | Refills: 0
Start: 2022-09-16 | End: 2022-10-15

## 2022-09-16 RX ORDER — REPAGLINIDE 1 MG/1
1 TABLET ORAL
Qty: 90 | Refills: 0
Start: 2022-09-16 | End: 2022-10-15

## 2022-09-16 RX ORDER — SITAGLIPTIN 50 MG/1
1 TABLET, FILM COATED ORAL
Qty: 0 | Refills: 0 | DISCHARGE

## 2022-09-16 RX ORDER — ACETAMINOPHEN 500 MG
650 TABLET ORAL ONCE
Refills: 0 | Status: COMPLETED | OUTPATIENT
Start: 2022-09-16 | End: 2022-09-16

## 2022-09-16 RX ADMIN — SPIRONOLACTONE 12.5 MILLIGRAM(S): 25 TABLET, FILM COATED ORAL at 05:41

## 2022-09-16 RX ADMIN — Medication 3: at 12:33

## 2022-09-16 RX ADMIN — BUMETANIDE 1 MILLIGRAM(S): 0.25 INJECTION INTRAMUSCULAR; INTRAVENOUS at 15:21

## 2022-09-16 RX ADMIN — GABAPENTIN 200 MILLIGRAM(S): 400 CAPSULE ORAL at 15:21

## 2022-09-16 RX ADMIN — Medication 1: at 09:07

## 2022-09-16 RX ADMIN — Medication 9 UNIT(S): at 12:33

## 2022-09-16 RX ADMIN — APIXABAN 2.5 MILLIGRAM(S): 2.5 TABLET, FILM COATED ORAL at 05:41

## 2022-09-16 RX ADMIN — LIDOCAINE 1 PATCH: 4 CREAM TOPICAL at 06:00

## 2022-09-16 RX ADMIN — Medication 650 MILLIGRAM(S): at 07:33

## 2022-09-16 RX ADMIN — BUMETANIDE 1 MILLIGRAM(S): 0.25 INJECTION INTRAMUSCULAR; INTRAVENOUS at 05:42

## 2022-09-16 RX ADMIN — AMLODIPINE BESYLATE 5 MILLIGRAM(S): 2.5 TABLET ORAL at 05:41

## 2022-09-16 RX ADMIN — Medication 81 MILLIGRAM(S): at 12:32

## 2022-09-16 RX ADMIN — Medication 9 UNIT(S): at 09:08

## 2022-09-16 RX ADMIN — GABAPENTIN 200 MILLIGRAM(S): 400 CAPSULE ORAL at 05:40

## 2022-09-16 RX ADMIN — ERTAPENEM SODIUM 100 MILLIGRAM(S): 1 INJECTION, POWDER, LYOPHILIZED, FOR SOLUTION INTRAMUSCULAR; INTRAVENOUS at 05:36

## 2022-09-16 RX ADMIN — Medication 150 MILLIGRAM(S): at 05:49

## 2022-09-16 RX ADMIN — FAMOTIDINE 20 MILLIGRAM(S): 10 INJECTION INTRAVENOUS at 12:33

## 2022-09-16 RX ADMIN — LIDOCAINE 1 PATCH: 4 CREAM TOPICAL at 06:57

## 2022-09-16 NOTE — PROGRESS NOTE ADULT - PROBLEM SELECTOR PLAN 3
GFR 37%  -patient follows [facility] outpatient, consulted [facility]    -sCr worsened in the setting of diuretics   -strict Is/Os. Check daily BMP  -avoid nephrotoxic agents  -renally dose all medications    #Hypokalemia: Give K supplement to maintain K >4. GFR 37%  -patient follows [facility] outpatient, consulted [facility]    -sCr worsened in the setting of diuretics , elevated, fluctuating but stable   -strict Is/Os. Check daily BMP  -avoid nephrotoxic agents  -renally dose all medications    #Hypokalemia: Give K supplement to maintain K >4.

## 2022-09-16 NOTE — PROGRESS NOTE ADULT - PROBLEM SELECTOR PLAN 6
diet: CC + DASH  dvt ppx: hx of dvt, apixaban 2.5 BID  Bowel regimen: miralax qd. monitor for constipation       Discharge planning likely 9/16 pending improvement in renal function    For Follow-up outpatient:  1. Follow-up with urologist Dr. Montenegro outpatient.   2. Follow up with cardiologist Dr. Hagan   3. Follow up with nephrologist diet: CC + DASH  dvt ppx: hx of dvt, apixaban 2.5 BID  Bowel regimen: miralax qd. monitor for constipation       Discharge planning 40mins    For Follow-up outpatient:  1. Follow-up with urologist Dr. Montenegro, [facility]  2. Follow up with cardiologist Dr. Hagan in 1-2 weeks  3. Follow up with nephrologist [employer] [phone] in 1 week.  3. Follow up with endocrinologist Dr. Dior Monroy in 4 weeks. diet: CC + DASH  dvt ppx: hx of dvt, apixaban 2.5 BID  Bowel regimen: miralax qd. monitor for constipation       Discharge planning 40mins    For Follow-up outpatient:  1. Follow-up with urologist Dr. Montenegro, [facility]  2. Follow up with cardiologist Dr. Hagan in 1-2 weeks  3. Follow up with nephrologist  [facility] [phone] in 1 week.  3. Follow up with endocrinologist Dr. Rader in 4 weeks.

## 2022-09-16 NOTE — DISCHARGE NOTE PROVIDER - NSDCCPCAREPLAN_GEN_ALL_CORE_FT
PRINCIPAL DISCHARGE DIAGNOSIS  Diagnosis: Heart failure  Assessment and Plan of Treatment: Continue Bumex at recommended dose and follow up with your cardiologist within 1 week.  Weigh yourself daily.  If you gain 3lbs in 3 days, or 5lbs in a week call your Health Care Provider.  Do not eat or drink foods containing more than 2000mg of salt (sodium) in your diet every day.  Call your Health Care Provider if you have any swelling or increased swelling in your feet, ankles, and/or stomach.  Take all of your medication as directed.  If you become dizzy call your Health Care Provider.      SECONDARY DISCHARGE DIAGNOSES  Diagnosis: Stage 3 chronic kidney disease  Assessment and Plan of Treatment: Kidney function reviewed by nephrology, recommending outpatient follow up with your nephrologist within 1 week.    Diagnosis: Hydronephrosis  Assessment and Plan of Treatment: -CT showing "Suggestion of chronic and severe right-sided hydronephrosis as on 11/3/2020 CT chest"  -Renal u/s with chronic severe R sided hydronephrosis  >      >Renal Lasix scan done: No evidence of a functioning right kidney > Re-consulted urology team for further recommendations, they are aware and recommended follow-up with Dr. Montenegro outpatient.    Diagnosis: Diabetes mellitus with hyperglycemia  Assessment and Plan of Treatment: -A1c on Aug 23, 2022 (outpatient records) was 9.6  -Continue gabapentin for neuropathy  -For discharge: home regimen per Endo team: -Prandin 1mg three times a day before meals and Tradjenta 5mg daily  -Discontinue prior hypoglycemic agents : Januvia and glimepiride. -F/U outpatient in 4 weeks.    Diagnosis: Hyperlipidemia  Assessment and Plan of Treatment: Continue with your cholesterol medications. Continue Atorvastatin and Aspirin as prescribed. Eat a heart healthy diet that is low in saturated fats and salt, and includes whole grains, fruits, vegetables and lean protein; exercise regularly (consult with your physician or cardiologist first); maintain a heart healthy weight; if you smoke - quit (A resource to help you stop smoking is the [facility] Tobacco [facility] – phone number [phone].). Continue to follow with your primary physician or cardiologist.  Seek medical help for dizziness, Lightheadedness, Blurry vision, Headache, Chest pain, Shortness of breath    Diagnosis: Hypertension  Assessment and Plan of Treatment: Continue with Amlodipine and Toprol XL upon discharge and follow up with your cardiologist within 1 week.  Eat a heart healthy diet that is low in saturated fats and salt, and includes whole grains, fruits, vegetables and lean protein   Exercise regularly (consult with your physician or cardiologist first); maintain a heart healthy weight.   If you smoke - quit (A resource to help you stop smoking is the [facility] Tobacco Control – phone number [phone].). Continue to follow with your primary physician or cardiologist.   Seek medical help for dizziness, Lightheadedness, Blurry vision, Headache, Chest pain, Shortness of breath  Follow up with your medical doctor to establish long term blood pressure treatment goals.    Diagnosis: Obesity  Assessment and Plan of Treatment: Exercise and perform physical activity as directed by your caregiver. To increase physical activity, try the following:   Use stairs instead of elevators.   Park farther away from store entrances.   Garden, bike, or walk instead of watching television or using the computer.  Eat healthy, low-calorie foods and drinks on a regular basis. Eat more fruits and vegetables. Use low-calorie cookbooks or take healthy cooking classes.   Limit fast food, sweets, and processed snack foods.  Eat smaller portions.   Keep a daily journal of everything you eat. There are many free websites to help you with this. It may be helpful to measure your foods so you can determine if you are eating the correct portion sizes.   Avoid drinking alcohol. Drink more water and drinks without calories.   Take vitamins and supplements only as recommended by your caregiver.   Weight-loss support groups, Registered Dieticians, counselors, and stress reduction education can also be very helpful.  SEEK IMMEDIATE MEDICAL CARE IF:  You have chest pain or tightness.  You have trouble breathing or feel short of breath.  You have weakness or leg numbness.  You feel confused or have trouble talking.  You have sudden changes in your vision

## 2022-09-16 NOTE — DISCHARGE NOTE NURSING/CASE MANAGEMENT/SOCIAL WORK - NSDCFUADDAPPT_GEN_ALL_CORE_FT
APPTS ARE READY TO BE MADE: [X] YES    Best Family or Patient Contact (if needed):    Additional Information about above appointments (if needed):    1: Primary care provider  2: Cardiology  3: Urology  4: Nephrology    Other comments or requests:   1. Follow-up with urologist Dr. Montenegro, [facility]  2. Follow up with cardiologist Dr. Hagan in 1-2 weeks  3. Follow up with nephrologist [employer] [phone] in 1 week.  3. Follow up with endocrinologist Dr. Dior Monroy in 4 weeks.

## 2022-09-16 NOTE — PROGRESS NOTE ADULT - ASSESSMENT
ASSESSMENT:  81 yo F wit a PMH of breast CA, DM, HTN, HLD, CHF, chronic RUE lymphedema p/w substernal/epigastric pain, admitted for HF, nephrology consulted for CKD.    -CKD stage 3/4---- base line creatinine 1.5 mg/dl  -Dae -non oliguric ---improving, no on po diuretics  -Lytes controlled  -HTN- blood pressure controlled   -CHF ---volume overload   -acid base----monitor bicarb while on diuretics  -UTI---on abx  -lymphedema    RECOMMENDATION:  -creatinine fluctuating but  stable for now.  -con  Bumex 1mg po bid   -continue spironolactone 12.5 mg daily.  -keep k > 4 ,mag >2 ,phas >3 while on diuretics.  -monitor bmp   -Avoid nephrotoxins as able  -Dose meds for GFR 30 ml/min  -monitor I/Os  -daily weights  -urology rec appreciated         Thank you for involving [facility] in this patient's care.    With warm regards    Brenda Rader  [facility]  Office: [phone]

## 2022-09-16 NOTE — PROGRESS NOTE ADULT - SUBJECTIVE AND OBJECTIVE BOX
seen and examined at  the bedside , in good spirit. no sob     MEDICATIONS  (STANDING):  amLODIPine   Tablet 5 milliGRAM(s) Oral daily  apixaban 2.5 milliGRAM(s) Oral two times a day  aspirin  chewable 81 milliGRAM(s) Oral daily  atorvastatin 40 milliGRAM(s) Oral at bedtime  BACItracin   Ointment 1 Application(s) Topical two times a day  buMETAnide 1 milliGRAM(s) Oral two times a day  dextrose 5%. 1000 milliLiter(s) (50 mL/Hr) IV Continuous <Continuous>  dextrose 5%. 1000 milliLiter(s) (100 mL/Hr) IV Continuous <Continuous>  dextrose 50% Injectable 25 Gram(s) IV Push once  dextrose 50% Injectable 12.5 Gram(s) IV Push once  dextrose 50% Injectable 25 Gram(s) IV Push once  ertapenem  IVPB 500 milliGRAM(s) IV Intermittent every 24 hours  ertapenem  IVPB      famotidine    Tablet 20 milliGRAM(s) Oral daily  gabapentin 200 milliGRAM(s) Oral three times a day  glucagon  Injectable 1 milliGRAM(s) IntraMuscular once  insulin glargine Injectable (LANTUS) 18 Unit(s) SubCutaneous at bedtime  insulin lispro (ADMELOG) corrective regimen sliding scale   SubCutaneous three times a day before meals  insulin lispro (ADMELOG) corrective regimen sliding scale   SubCutaneous at bedtime  insulin lispro Injectable (ADMELOG) 8 Unit(s) SubCutaneous three times a day before meals  lidocaine   4% Patch 1 Patch Transdermal every 24 hours  metoprolol succinate  milliGRAM(s) Oral daily  polyethylene glycol 3350 17 Gram(s) Oral daily  spironolactone 12.5 milliGRAM(s) Oral daily    VITALS:  T(C): , Max: 36.5 (09-12-22 @ 16:25)  T(F): , Max: 97.7 (09-12-22 @ 16:25)  HR: 65 (09-13-22 @ 09:19)  BP: 125/74 (09-13-22 @ 09:19)  RR: 18 (09-13-22 @ 09:19)  SpO2: 96% (09-13-22 @ 09:19)    I and O's:    09-12 @ 07:01  -  09-13 @ 07:00  --------------------------------------------------------  IN: 400 mL / OUT: 1800 mL / NET: -1400 mL    PHYSICAL EXAM:  General: Alerted, oriented  HEENT: MMM  Lungs: diminished at bases  Heart: RRR S1S2  Abdomen: Soft, NTND  Ext:   trace edema b/l ., skin rash   : no chen    LABS:                        13.0   8.86  )-----------( 270      ( 13 Sep 2022 07:28 )             42.9     09-13    138  |  98  |  52<H>  ----------------------------<  200<H>  4.4   |  27  |  1.85<H>    Ca    9.3      13 Sep 2022 07:28  Phos  3.7     09-12  Mg     1.9     09-12    RADIOLOGIC STUDIES:         ACC: [id number] EXAM:  NM KIDNEY IMG LASIX                          PROCEDURE DATE:  09/13/2022          INTERPRETATION:  RADIOPHARMACEUTICAL: 10.2 mCi   [id number] (MAG3), I.V.    CLINICAL STATEMENT: 82-year-old female with history of breast cancer   presents with chronic severe right hydronephrosis.    TECHNIQUE: Patient came with indwelling Chen catheter in place. Patient   was hydrated with 850 mL of normal saline solution intravenously. Dynamic   images of the posterior abdomen were obtained for 49 minutes following   administration of radiopharmaceutical.  Lasix 40 mg I.V. was administered   at 26 minutes post-injection.    FINDINGS:  The renal perfusion images delayed and diminished flow to the   left kidney and nodiscernible flow to the right renal bed.    The functional images show good cortical uptake of radiotracer by the   left kidney. There is prompt appearance of activity in the left   collecting system. There is spontaneous drainage of activity from the   left collecting system prior to Lasix. There is mild cortical retention   of activity at the end of the study suggestive of mildly impaired renal   function.    There is no evidence of a functioning right kidney.    IMPRESSION: Abnormal diuretic renal scan.    Mildly diminished flow to and function of the left kidney with no   evidence of obstruction.    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    --- End of Report --      FRITZ TEMPLE MD; Attending Nuclear Medicine  This document has been electronically signed. Sep 13 2022  3:42PM

## 2022-09-16 NOTE — DISCHARGE NOTE PROVIDER - PROVIDER TOKENS
PROVIDER:[TOKEN:[[id number]],FOLLOWUP:[1 week]],PROVIDER:[TOKEN:[[id number]],FOLLOWUP:[1 week]],PROVIDER:[TOKEN:[[id number]],FOLLOWUP:[1 week]],PROVIDER:[TOKEN:[[id number]],FOLLOWUP:[1 month]],PROVIDER:[TOKEN:[[id number]],FOLLOWUP:[1 week]]

## 2022-09-16 NOTE — DISCHARGE NOTE PROVIDER - NSDCFUSCHEDAPPT_GEN_ALL_CORE_FT
Ld Hagan  [facility] Physician [facility]  CARDIOLOGY [address]  Scheduled Appointment: 10/19/2022    Katie Espinoza  [facility]  INTMED [address]  Scheduled Appointment: 10/19/2022     Katie Espinoza  [facility]  INTMED [address]  Scheduled Appointment: 09/21/2022    Ld Hagan  [facility]  CARDIOLOGY [address]  Scheduled Appointment: 10/19/2022    Alexei Montenegro  [facility]  UROLOGY [address]  Scheduled Appointment: 10/31/2022

## 2022-09-16 NOTE — PROGRESS NOTE ADULT - SUBJECTIVE AND OBJECTIVE BOX
DATE OF SERVICE: 09-16-22 @ 16:46    Patient is a 82y old  Female who presents with a chief complaint of CHF (12 Sep 2022 17:19)      INTERVAL HISTORY: feels ok       MEDICATIONS:  amLODIPine   Tablet 5 milliGRAM(s) Oral daily  buMETAnide 1 milliGRAM(s) Oral two times a day  metoprolol succinate  milliGRAM(s) Oral daily  spironolactone 12.5 milliGRAM(s) Oral daily        PHYSICAL EXAM:  T(C): 36.3 (09-16-22 @ 08:56), Max: 36.5 (09-16-22 @ 00:15)  HR: 63 (09-16-22 @ 08:56) (63 - 63)  BP: 126/62 (09-16-22 @ 08:56) (121/63 - 126/62)  RR: 18 (09-16-22 @ 08:56) (18 - 18)  SpO2: 92% (09-16-22 @ 08:56) (91% - 92%)  Wt(kg): --  I&O's Summary    15 Sep 2022 07:01  -  16 Sep 2022 07:00  --------------------------------------------------------  IN: 240 mL / OUT: 2600 mL / NET: -2360 mL          Appearance: In no distress	  HEENT:    PERRL, EOMI	  Cardiovascular:  S1 S2, No JVD  Respiratory: Lungs clear to auscultation	  Gastrointestinal:  Soft, Non-tender, + BS	  Vascularature:  No edema of LE  Psychiatric: Appropriate affect   Neuro: no acute focal deficits                               13.0   8.79  )-----------( 273      ( 15 Sep 2022 07:21 )             43.0     09-16    136  |  98  |  57<H>  ----------------------------<  135<H>  4.3   |  25  |  2.05<H>    Ca    9.2      16 Sep 2022 06:50          Labs personally reviewed      ASSESSMENT/PLAN: 	    Patient is an 82yof w/ hx of HTN, hyperlipidemia, type 2 diabetes mellitus, breast cancer status post right partial mastectomy, rectal carcinoma status post resection, anemia, diastolic heart failure, hx of right lower extremity DVT, CAD s/p 2 vessel CABG 11/9/2020 and renal insufficiency who was admitted for volume overload.     Problem/Plan - 1:  ·  Problem: Acute on chronic diastolic heart failure.   ·  Plan: Noncompliance with OP diuretic regimen  Repeat TTE with impaired E/e' c/w increased filling pressures LV and volume overload  -Continues to complain of SOB though improved from admission  + JVD  - BNP significantly decreased (1038->276)  - Repeat CXR pending  - Cr slightly above baseline - most likely d/t diuretics   -C/w PO Bumex 1mg PO BID  - Outpt serial BMP with PCP and cards Dr Hagan  - I discussed with Dr Hagan who will arrange for follow up next week     Problem/Plan - 2:  ·  Problem: Stage 3 chronic kidney disease.   ·  Plan: Renal FU appreciated  - Cr slightly above baseline - avoid nephrotoxins   - Renal following    Problem/Plan - 3:  ·  Problem: Diabetes mellitus with hyperglycemia.   ·  Plan: A1c on Aug 23, 2022 as 9.6  -Continue Insulin Lantus 16u HS, Lispro 7u TIDwm       Problem/Plan - 4:  ·  Problem: Hyperlipidemia.   ·  Plan: Continue home meds atorvastatin 40mg          Sergey Winchester DO [facility]  Cardiovascular Medicine  [address] [place] [address], [address]  Office: [phone]  Cell: [phone]

## 2022-09-16 NOTE — PROGRESS NOTE ADULT - PROBLEM SELECTOR PROBLEM 5
Hyperlipidemia
Obesity
Hyperlipidemia
Obesity
Obesity
Hyperlipidemia
Hyperlipidemia
Obesity
Hyperlipidemia
Obesity
Hyperlipidemia
Hyperlipidemia
Obesity
Obesity

## 2022-09-16 NOTE — PROGRESS NOTE ADULT - SUBJECTIVE AND OBJECTIVE BOX
[facility] Division of Hospital Medicine  Margy Almaraz MD M-F, 8A-5P: MS Teams, Pager: [phone]  Other Times: Ext: [phone], Pager: [phone]      Patient is a 82y old  Female who presents with a chief complaint of CHF (12 Sep 2022 17:19)      SUBJECTIVE / OVERNIGHT EVENTS:  Patient was examined this morning. She is reporting having L wrist discomfort at IV site. She continues to have mild intermittent dyspnea with ambulation. Her back pain has tolerable currently. Denies headache, fever, dizziness, chest pain, palpitations, nausea, abdominal pain, or dysuria.       ADDITIONAL REVIEW OF SYSTEMS:    MEDICATIONS  (STANDING):  amLODIPine   Tablet 5 milliGRAM(s) Oral daily  apixaban 2.5 milliGRAM(s) Oral two times a day  aspirin  chewable 81 milliGRAM(s) Oral daily  atorvastatin 40 milliGRAM(s) Oral at bedtime  BACItracin   Ointment 1 Application(s) Topical two times a day  buMETAnide 1 milliGRAM(s) Oral two times a day  dextrose 5%. 1000 milliLiter(s) (100 mL/Hr) IV Continuous <Continuous>  dextrose 5%. 1000 milliLiter(s) (50 mL/Hr) IV Continuous <Continuous>  dextrose 50% Injectable 25 Gram(s) IV Push once  dextrose 50% Injectable 12.5 Gram(s) IV Push once  dextrose 50% Injectable 25 Gram(s) IV Push once  ertapenem  IVPB 500 milliGRAM(s) IV Intermittent every 24 hours  famotidine    Tablet 20 milliGRAM(s) Oral daily  gabapentin 200 milliGRAM(s) Oral three times a day  glucagon  Injectable 1 milliGRAM(s) IntraMuscular once  insulin glargine Injectable (LANTUS) 20 Unit(s) SubCutaneous at bedtime  insulin lispro (ADMELOG) corrective regimen sliding scale   SubCutaneous three times a day before meals  insulin lispro (ADMELOG) corrective regimen sliding scale   SubCutaneous at bedtime  insulin lispro Injectable (ADMELOG) 9 Unit(s) SubCutaneous three times a day before meals  lidocaine   4% Patch 1 Patch Transdermal every 24 hours  lidocaine   4% Patch 1 Patch Transdermal every 24 hours  metoprolol succinate  milliGRAM(s) Oral daily  polyethylene glycol 3350 17 Gram(s) Oral daily  spironolactone 12.5 milliGRAM(s) Oral daily    MEDICATIONS  (PRN):  dextrose Oral Gel 15 Gram(s) Oral once PRN Blood Glucose LESS THAN 70 milliGRAM(s)/deciliter  melatonin 3 milliGRAM(s) Oral at bedtime PRN Insomnia      CAPILLARY BLOOD GLUCOSE      POCT Blood Glucose.: 269 mg/dL (16 Sep 2022 11:38)  POCT Blood Glucose.: 173 mg/dL (16 Sep 2022 09:05)  POCT Blood Glucose.: 189 mg/dL (16 Sep 2022 07:49)  POCT Blood Glucose.: 157 mg/dL (15 Sep 2022 21:19)  POCT Blood Glucose.: 182 mg/dL (15 Sep 2022 16:35)      I&O's Summary    15 Sep 2022 07:01  -  16 Sep 2022 07:00  --------------------------------------------------------  IN: 240 mL / OUT: 2600 mL / NET: -2360 mL        Daily     Daily     PHYSICAL EXAM:  Vital Signs Last 24 Hrs  T(C): 36.3 (16 Sep 2022 08:56), Max: 36.5 (16 Sep 2022 00:15)  T(F): 97.4 (16 Sep 2022 08:56), Max: 97.7 (16 Sep 2022 00:15)  HR: 63 (16 Sep 2022 08:56) (63 - 63)  BP: 126/62 (16 Sep 2022 08:56) (119/59 - 126/62)  BP(mean): --  RR: 18 (16 Sep 2022 08:56) (18 - 18)  SpO2: 92% (16 Sep 2022 08:56) (91% - 96%)    Parameters below as of 16 Sep 2022 08:56  Patient On (Oxygen Delivery Method): room air      CONSTITUTIONAL: NAD, well-developed  EYES: PERRLA; conjunctiva and sclera clear  ENMT: Moist oral mucosa,  NECK: Supple  RESPIRATORY: Normal respiratory effort; lungs are clear to auscultation bilaterally  CARDIOVASCULAR: Regular rate and rhythm, normal S1 and S2, no murmur/rub/gallop;   +Mild pedal edema; +Right UE edema: chronic per patient. Peripheral pulses are 2+ bilaterally.   ABDOMEN: Nontender to palpation, normoactive bowel sounds, no rebound/guarding;   BACK: no back tenderness on exam  MUSCULOSKELETAL:  no clubbing or cyanosis of digits; no joint swelling or tenderness to palpation, moving all extremities   PSYCH: A+O to person, place, and time; affect appropriate  SKIN: chronic BL LE venous stasis ulcerations       LABS:                        13.0   8.79  )-----------( 273      ( 15 Sep 2022 07:21 )             43.0     09-16    136  |  98  |  57<H>  ----------------------------<  135<H>  4.3   |  25  |  2.05<H>    Ca    9.2      16 Sep 2022 06:50                  COVID-19 PCR: NotDetec (15 Sep 2022 07:41)  SARS-CoV-2: NotDetec (07 Sep 2022 19:04)  COVID-19 PCR: NotDetec (07 Sep 2022 12:16)      RADIOLOGY & ADDITIONAL TESTS:  Results Reviewed:   Imaging Personally Reviewed:  Electrocardiogram Personally Reviewed:    COORDINATION OF CARE:  Care Discussed with Consultants/Other Providers [Y/N]:  Prior or Outpatient Records Reviewed [Y/N]:

## 2022-09-16 NOTE — PROGRESS NOTE ADULT - PROVIDER SPECIALTY LIST ADULT
Cardiology
Hospitalist
Hospitalist
Nephrology
Cardiology
Nephrology
Urology
Endocrinology
Endocrinology
Hospitalist
Internal Medicine
Endocrinology
Hospitalist
Endocrinology
Hospitalist
Hospitalist
Endocrinology
Hospitalist
Hospitalist
Endocrinology
Endocrinology

## 2022-09-16 NOTE — DISCHARGE NOTE PROVIDER - CARE PROVIDER_API CALL
Alexei Montenegro)  Urology  [address] [facility]  [place] [address]  Phone: [phone]  Fax: [phone]  Follow Up Time: 1 week    ELI KISER  Internal Medicine  [address] [place] [address]  Phone: ()-  Fax: ()-  Follow Up Time: 1 week    Katie Espinoza (DO)  Internal Medicine  [facility] [address]nd [place] [address]  Phone: [phone]  Fax: [phone]  Follow Up Time: 1 week    Dior Monroy)  EndocrinologyMetabDiabetes; Internal Medicine  [address] [place] [address]  Phone: [phone]  Fax: [phone]  Follow Up Time: 1 month    Brenda Rader (DO)  Medicine  [address]  [place] [address]  Phone: [phone]  Follow Up Time: 1 week

## 2022-09-16 NOTE — PROGRESS NOTE ADULT - ASSESSMENT
Assessment  DMT2: 82y Female with DM T2 with hyperglycemia, A1C 9.4%, was on oral meds at home, now on basal bolus insulin, blood sugars in overall acceptable range with intermittent elevations, no hypoglycemic episodes, eating meals, no acute events, planning DC.  CHF: on medications, no chest pain, stable, monitored.  HTN: Controlled,  on antihypertensive medications.  CKD: Monitor labs/BMP,   Obesity: No strict exercise routines, not on any weight loss program, neither on low calorie diet.        Dior Monroy MD  Cell: [phone]  Office: [phone]     Assessment  DMT2: 82y Female with DM T2 with hyperglycemia, A1C 9.4%, was on oral meds at home, now on basal bolus insulin, blood sugars in  overall acceptable range with intermittent elevations, no hypoglycemic episodes, eating meals, no acute events, planning DC.  CHF: on medications, no chest pain, stable, monitored.  HTN: Controlled,  on antihypertensive medications.  CKD: Monitor labs/BMP,   Obesity: No strict exercise routines, not on any weight loss program, neither on low calorie diet.        Dior Monroy MD  Cell: [phone]  Office: [phone]

## 2022-09-16 NOTE — PROGRESS NOTE ADULT - PROBLEM SELECTOR PROBLEM 1
Diabetes mellitus with hyperglycemia
Acute on chronic diastolic heart failure
Diabetes mellitus with hyperglycemia
Acute on chronic diastolic heart failure
Diabetes mellitus with hyperglycemia
Acute on chronic diastolic heart failure
Diabetes mellitus with hyperglycemia
Acute on chronic diastolic heart failure
Acute on chronic diastolic heart failure
Diabetes mellitus with hyperglycemia
Acute on chronic diastolic heart failure

## 2022-09-16 NOTE — DISCHARGE NOTE NURSING/CASE MANAGEMENT/SOCIAL WORK - NSDCVIVACCINE_GEN_ALL_CORE_FT
COVID-19, mRNA, LNP-S, PF, 30 mcg/0.3 mL dose (Pfizer); 27-Jul-2021 15:42; Darien Garcia); Pf[employer] Inc; [id number] (Exp. Date: 31-Aug-2021); IntraMuscular; Deltoid Left.; 0.3 milliLiter(s);

## 2022-09-16 NOTE — PROGRESS NOTE ADULT - PROBLEM SELECTOR PROBLEM 4
Stage 3 chronic kidney disease
Diabetes mellitus with hyperglycemia
Stage 3 chronic kidney disease
Diabetes mellitus with hyperglycemia
Diabetes mellitus with hyperglycemia

## 2022-09-16 NOTE — DISCHARGE NOTE PROVIDER - NSDCMRMEDTOKEN_GEN_ALL_CORE_FT
amLODIPine 5 mg oral tablet: 1 tab(s) orally once a day  apixaban 2.5 mg oral tablet: 1 tab(s) orally 2 times a day  aspirin 81 mg oral tablet, chewable: 1 tab(s) orally once a day  atorvastatin 40 mg oral tablet: 1 tab(s) orally once a day (at bedtime)  bumetanide 1 mg oral tablet: 1 tab(s) orally once a day    Note:Last filled in april for 1 month. 1 tab twice a day directions.  famotidine 20 mg oral tablet: 1 tab(s) orally once a day (at bedtime)  Gralise 600 mg/24 hours oral tablet, extended release: 1 tab(s) orally once a day  spironolactone 25 mg oral tablet: 0.5 tab(s) orally once a day  Toprol- mg oral tablet, extended release: 1.5 tab(s) orally once a day  Vitamin B-12: 1 tab(s) orally once a day  Vitamin D3: 1 tab(s) orally once a day   amLODIPine 5 mg oral tablet: 1 tab(s) orally once a day  apixaban 2.5 mg oral tablet: 1 tab(s) orally 2 times a day  aspirin 81 mg oral tablet, chewable: 1 tab(s) orally once a day  atorvastatin 40 mg oral tablet: 1 tab(s) orally once a day (at bedtime)  bumetanide 1 mg oral tablet: 1 tab(s) orally 2 times a day  famotidine 20 mg oral tablet: 1 tab(s) orally once a day (at bedtime)  Gralise 600 mg/24 hours oral tablet, extended release: 1 tab(s) orally once a day  lidocaine 4% topical film: Apply topically to affected area once a day   12 hours on, 12 hours off  Prandin 1 mg oral tablet: 1 tab(s) orally 3 times a day before meals  spironolactone 25 mg oral tablet: 0.5 tab(s) orally once a day  Toprol- mg oral tablet, extended release: 1.5 tab(s) orally once a day  Tradjenta 5 mg oral tablet: 1 tab(s) orally once a day   Vitamin B-12: 1 tab(s) orally once a day  Vitamin D3: 1 tab(s) orally once a day

## 2022-09-16 NOTE — PROGRESS NOTE ADULT - PROBLEM SELECTOR PROBLEM 2
Acute on chronic diastolic heart failure
Diabetes mellitus with hyperglycemia
Hydronephrosis
Acute on chronic diastolic heart failure
Diabetes mellitus with hyperglycemia
Diabetes mellitus with hyperglycemia
Acute on chronic diastolic heart failure
Hydronephrosis
Diabetes mellitus with hyperglycemia
Diabetes mellitus with hyperglycemia
Hydronephrosis
Diabetes mellitus with hyperglycemia
Acute on chronic diastolic heart failure

## 2022-09-16 NOTE — DISCHARGE NOTE PROVIDER - HOSPITAL COURSE
82yof w/ hx of HTN, hyperlipidemia, type 2 diabetes mellitus, breast cancer status post right partial mastectomy, rectal carcinoma status post resection, anemia, diastolic heart failure, hx of right lower extremity DVT, CAD s/p 2 vessel CABG 11/9/2020 and renal insufficiency who was admitted for volume overload.     Acute on chronic diastolic heart failure.   Patient's cardiologist reportedly increased bumex dose to 1mg BID on multiple days of the week, however, patient has not been taking it.  TTE 1/2022 was limited, but showed mild RV enlargement, mild-mod TR, mod pulm HTN  TTE with impaired E/e' c/w increased filling pressures LV  -ProBNP 1038>276  -Chest xray PA/lat 9/13: Negative for acute radiographic cardiopulmonary pulmonary pathology.  -Switched to oral Bumex 1mg BID  -Discussed with Dr. Winchester (follows Dr. Hagan outpatient)  -Continue spironolactone 12.5 qD, metoprolol 150 qD    Hydronephrosis.   -CT showing "Suggestion of chronic and severe right-sided hydronephrosis as on 11/3/2020 CT chest"  -Renal u/s with chronic severe R sided hydronephrosis  >      >Renal Lasix scan done: No evidence of a functioning right kidney > Re-consulted urology team for further recs, they are aware and recommend follow-up with Dr. Montenegro outpatient.   -Monitor bladder scans for PVR. Chen placed for Lasix scan, remove chen and TOV 9/15.    #UTI: patient complaining of urinary symptoms and UA + with urine culture grew e coli >100k,  now sensitivities showing ESBL   -Started Ceftriaxone 9/10, Switched to Ertapenem 9/12 and finished course of x 5days.    Stage 3 chronic kidney disease.   GFR 37%  -patient follows Loreta outpatient, consulted [facility]  -sCr worsened in the setting of diuretics     #Hypokalemia: Give K supplement to maintain K >4.    Diabetes mellitus with hyperglycemia.   -A1c on Aug 23, 2022 (outpatient records) was 9.6  -Continue gabapentin for neuropathy (pt takes ER, but while inpatient continue 200 TID)  -Continue Insulin Lantus 20u HS, Lispro 9u TIDwm  -Nutritionist eval  >     >For discharge: DM home regimen per Endo team: -Prandin 1mg TID before meals and Tradjenta 5mg daily  -Discontinue prior hypoglycemic agents : Januvia and glimepiride. -F/U outpatient in 4 weeks.    Hyperlipidemia.   Continue home meds atorvastatin 40, asa 81      #Back pain:   Evaluation for UTI and hydronephrosis as above  Suspect MSK element. Continue lidocaine patches    #Left arm pain: left shoulder x-ray without acute pathology, left upper extremity duplex US showed superficial thrombus: Warm packs for comfort.    For Follow-up outpatient:  1. Follow-up with urologist Dr. Montenegro, [facility]  2. Follow up with cardiologist Dr. Hagan in 1-2 weeks  3. Follow up with nephrologist [facility] [phone] in 1 week.  3. Follow up with endocrinologist Dr. Dior Monroy in 4 weeks.    Medically cleared for discharge home. Pt refused home care. Discussed discharge with Dr. Almaraz. 82yof w/ hx of HTN, hyperlipidemia, type 2 diabetes mellitus, breast cancer status post right partial mastectomy, rectal carcinoma status post resection, anemia, diastolic heart failure, hx of right lower extremity DVT, CAD s/p 2 vessel CABG 11/9/2020 and renal insufficiency who was admitted for volume overload.     Acute on chronic diastolic heart failure.   Patient's cardiologist reportedly increased bumex dose to 1mg BID on multiple days of the week, however, patient has not been taking it.  TTE 1/2022 was limited, but showed mild RV enlargement, mild-mod TR, mod pulm HTN  TTE with impaired E/e' c/w increased filling pressures LV  -ProBNP 1038>276  -Chest xray PA/lat 9/13: Negative for acute radiographic cardiopulmonary pulmonary pathology.  -Switched to oral Bumex 1mg BID  -Discussed with Dr. Winchester (follows Dr. Hagan outpatient)  -Continue spironolactone 12.5 qD, metoprolol 150 qD    Hydronephrosis.   -CT showing "Suggestion of chronic and severe right-sided hydronephrosis as on 11/3/2020 CT chest"  -Renal u/s with chronic severe R sided hydronephrosis  >      >Renal Lasix scan done: No evidence of a functioning right kidney > Re-consulted urology team for further recs, they are aware and recommend follow-up with Dr. Montenegro outpatient.   -Monitor bladder scans for PVR. Chen placed for Lasix scan, remove chen and TOV 9/15.    #UTI: patient complaining of urinary symptoms and UA + with urine culture grew e coli >100k,  now sensitivities showing ESBL   -Started Ceftriaxone 9/10, Switched to Ertapenem 9/12 and finished course of x 5days.    Stage 3 chronic kidney disease.   GFR 37%  -patient follows Loreta outpatient, consulted [facility]  -sCr worsened in the setting of diuretics     #Hypokalemia: Give K supplement to maintain K >4.    Diabetes mellitus with hyperglycemia.   -A1c on Aug 23, 2022 (outpatient records) was 9.6  -Continue gabapentin for neuropathy (pt takes ER, but while inpatient continue 200 TID)  -Continue Insulin Lantus 20u HS, Lispro 9u TIDwm  -Nutritionist eval  >     >For discharge: DM home regimen per Endo team: -Prandin 1mg TID before meals and Tradjenta 5mg daily  -Discontinue prior hypoglycemic agents : Januvia and glimepiride. -F/U outpatient in 4 weeks.    Hyperlipidemia.   Continue home meds atorvastatin 40, asa 81      #Back pain:   Evaluation for UTI and hydronephrosis as above  Suspect MSK element. Continue lidocaine patches    #Left arm pain: left shoulder x-ray without acute pathology, left upper extremity duplex US showed superficial thrombus: Warm packs for comfort.    c/o numbness in R leg on day of discharge, which per daughter, she has been complaining of throughout admission. Pt walking with PT with no pain or numbness/tingling. Per Dr. Almaraz, it is chronic and intermittent, and pt can f/u with her PCP within 1 week for further workup.    For Follow-up outpatient:  1. Follow-up with urologist Dr. Montenegro, [facility]  2. Follow up with cardiologist Dr. Hagan in 1-2 weeks  3. Follow up with nephrologist [facility] [phone] in 1 week.  3. Follow up with endocrinologist Dr. Dior Monroy in 4 weeks.    Medically cleared for discharge home. Pt refused home care. Discussed discharge with Dr. Almaraz.

## 2022-09-16 NOTE — DISCHARGE NOTE NURSING/CASE MANAGEMENT/SOCIAL WORK - NSDCPEFALRISK_GEN_ALL_CORE
For information on Fall & Injury Prevention, visit: [email or web address] OR  [email or web address] OR  https://www.cdc.gov/steadi/patient.html

## 2022-09-16 NOTE — PROGRESS NOTE ADULT - NS ATTEND AMEND GEN_ALL_CORE FT
Pt care and plan discussed and reviewed with NP. Plan as outlined above edited by me to reflect our discussion.
Patient seen and examined today at bedside. Chart, labs, vitals, radiology reviewed. Above H&P reviewed and edited where appropriate. Agree with history and physical exam. Agree with assessment and plan. I reviewed the overnight course of events and discussed the care with the patient and their family  35 minutes spent on total encounter of which more than fifty percent of the encounter was spent counseling and/or coordinating care by the attending physician.

## 2022-09-16 NOTE — PROGRESS NOTE ADULT - PROBLEM SELECTOR PROBLEM 3
Hydronephrosis
Hypertension
Hydronephrosis
Hydronephrosis
Hypertension
Hydronephrosis
Stage 3 chronic kidney disease
Hypertension
Stage 3 chronic kidney disease
Stage 3 chronic kidney disease

## 2022-09-16 NOTE — DISCHARGE NOTE PROVIDER - CARE PROVIDERS DIRECT ADDRESSES
,DirectAddress_Unknown,[email or web address],DirectAddress_Unknown,DirectAddress_Unknown

## 2022-09-16 NOTE — DISCHARGE NOTE NURSING/CASE MANAGEMENT/SOCIAL WORK - PATIENT PORTAL LINK FT
You can access the FollowMyHealth Patient Portal offered by [facility] by registering at the following website: [email or web address]. By joining [employer]’s FollowMyHealth portal, you will also be able to view your health information using other applications (apps) compatible with our system.

## 2022-09-16 NOTE — DISCHARGE NOTE NURSING/CASE MANAGEMENT/SOCIAL WORK - NSTOBACCONEVERSMOKERY/N_GEN_A
After Visit Summary   10/31/2017    Remigio Holly    MRN: [id number]           Patient Information     Date Of Birth          11/6/1935        Visit Information        Provider Department      10/31/2017 9:00 AM NL FLOAT NURSE, [facility]        Today's Diagnoses     Need for prophylactic vaccination and inoculation against influenza    -  1    PA (pernicious anemia)           Follow-ups after your visit        Your next 10 appointments already scheduled     Nov 09, 2017  8:20 AM CST   Return Visit with Jonathan Cardona,    [facility] ([facility])    [address] [place] [id number]   [phone]              Future tests that were ordered for you today     Open Future Orders        Priority Expected Expires Ordered    XR Knee Left 3 Views Routine 11/9/2017 10/26/2018 10/26/2017            Who to contact     If you have questions or need follow up information about today's clinic visit or your schedule please contact [facility] directly at [phone].  Normal or non-critical lab and imaging results will be communicated to you by [email or web address], letter or phone within 4 business days after the clinic has received the results. If you do not hear from us within 7 days, please contact the clinic through [employer]erSpott or phone. If you have a critical or abnormal lab result, we will notify you by phone as soon as possible.  Submit refill requests through TruVitals or call your pharmacy and they will forward the refill request to us. Please allow 3 business days for your refill to be completed.          Additional Information About Your Visit        [email or web address] Information     TruVitals gives you secure access to your electronic health record. If you see a primary care provider, you can also send messages to your care team and make appointments. If you have questions, please call your primary care clinic.  If you do not have a  primary care provider, please call [phone] and they will assist you.        Care EveryWhere ID     This is your Care EveryWhere ID. This could be used by other organizations to access your [facility] medical records  [id number]         Blood Pressure from Last 3 Encounters:   09/20/17 102/68   09/12/17 142/81   09/05/17 115/65    Weight from Last 3 Encounters:   10/04/17 163 lb 8 oz (74.2 kg)   09/20/17 164 lb 1.6 oz (74.4 kg)   09/12/17 162 lb 9.6 oz (73.8 kg)              We Performed the Following     ADMIN INFLUENZA (For MEDICARE Patients ONLY) []     FLU VACCINE, INCREASED ANTIGEN, PRESV FREE, AGE 65+ [72907]     INJECTION INTRAMUSCULAR OR SUB-Q     VITAMIN B12 INJ /1000MCG        Primary Care Provider Office Phone # Fax #    Moy Celis [phone]       [address] [facility] [address]        Equal Access to Services     ERICA RUSSELL : Hadii aad ku hadasho Soomaali, waaxda luqadaha, qaybta kaalmada adeegyada, waxay idiin haypalomon ivon haider . So [facility] [phone].    ATENCIÓN: Si habla español, tiene a tabares disposición servicios gratuitos de asistencia lingüística. Llame al [phone].    We comply with applicable federal civil rights laws and [place] laws. We do not discriminate on the basis of race, color, national origin, age, disability, sex, sexual orientation, or gender identity.            Thank you!     Thank you for choosing [facility]  for your care. Our goal is always to provide you with excellent care. Hearing back from our patients is one way we can continue to improve our services. Please take a few minutes to complete the written survey that you may receive in the mail after your visit with us. Thank you!             Your Updated Medication List - Protect others around you: Learn how to safely use, store and throw away your medicines at www.disposemymeds.org.          This list is accurate as of: 10/31/17 10:03 AM.  Always use your most  recent med list.                   Brand Name Dispense Instructions for use Diagnosis    acetaminophen 500 MG tablet    TYLENOL     Take 2 tablets (1,000 mg) by mouth every 8 hours    Status post total left knee replacement using cement       BIOFREEZE EX      Externally apply topically 3 times daily as needed        blood glucose monitoring lancets     1 Box    Use to test blood sugar 1 time daily or as directed.    Hypoglycemia       blood glucose monitoring test strip    ROBERT CONTOUR NEXT    100 each    Use to test blood sugar 1 times daily or as directed.    Hypoglycemia       cyanocobalamin 1000 MCG/ML injection    VITAMIN B12    1 mL    Inject 1 mL (1,000 mcg) into the muscle every 30 days 1 year of injections approved through 4/19/2017 per Dr. Ayon.    Pernicious anemia       MELATONIN PO      Take 5 mg by mouth At Bedtime        metoprolol 25 MG tablet    LOPRESSOR    180 tablet    Take 1 tablet (25 mg) by mouth 2 times daily    Aortic root dilatation (H)       oxyCODONE 5 MG IR tablet    ROXICODONE    30 tablet    Take 0.5 tablets (2.5 mg) by mouth every 4 hours as needed for moderate to severe pain    Status post total left knee replacement using cement       polyethylene glycol powder    MIRALAX/GLYCOLAX     Take 17 g by mouth daily as needed for constipation        rivaroxaban ANTICOAGULANT 15 MG Tabs tablet    XARELTO    90 tablet    Take 1 tablet (15 mg) by mouth daily    Paroxysmal atrial fibrillation (H)       senna-docusate 8.6-50 MG per tablet    SENOKOT-S;PERICOLACE     Take 1 tablet by mouth 2 times daily as needed        tamsulosin 0.4 MG capsule    FLOMAX     Take 0.4 mg by mouth daily           No

## 2022-09-16 NOTE — PROGRESS NOTE ADULT - PROBLEM SELECTOR PLAN 1
Patient's cardiologist reportedly increased bumex dose to 1mg BID on multiple days of the week, however, patient has not been taking it.  TTE 1/2022 was limited, but showed mild RV enlargement, mild-mod TR, mod pulm HTN  TTE with impaired E/e' c/w increased filling pressures LV    -ProBNP 1038>276  -Chest xray PA/lat 9/13: Negative for acute radiographic cardiopulmonary pulmonary pathology.  -Switched to oral Bumex 1mg BID  -Discussed with Dr. Winchester (follows Dr. Hagan outpatient)  -Continue spironolactone 12.5 qD, metoprolol 150 qD  -Daily BMP, Mg. Strict Is/Os. Daily standing weights
Will continue current insulin regimen for now. Will continue monitoring FS, log, and FU.  Patient with poorly-controlled diabetes, she and daughter are hesitant to starting insulin injections. If she is being DC home, can DC on the following DM regimen:  -Prandin 1mg TID before meals  -Decrease Januvia to 25mg daily  -Must discontinue glimepiride   -FU 4 weeks  Patient counseled for compliance with consistent low carb diet.
Will continue current insulin regimen for now. Will continue monitoring FS, log, and FU.  Patient with poorly-controlled diabetes, she and daughter are hesitant to starting insulin injections. If she is being DC home, can DC on the following DM regimen:  -Prandin 1mg TID before meals  -Tradjenta 5mg daily  -Discontinue prior hypoglycemic agents (Januvia and glimepiride)   -FU 4 weeks  Discussed plan with patient and daughter. Discussed possible need for insulin in the future, counseled for compliance with consistent low carb diet.
Patient's cardiologist reportedly increased bumex dose to 1mg BID on multiple days of the week, however, patient has not been taking it.  TTE 1/2022 was limited, but showed mild RV enlargement, mild-mod TR, mod pulm HTN  TTE with impaired E/e' c/w increased filling pressures LV    -ProBNP 1038>276  -F/U chest xray ordered 9/12  -Switched to oral Bumex 1mg BID  -Discussed with Dr. Winchester (follows Dr. Hagan outpatient)  -Continue spironolactone 12.5 qD, metoprolol 150 qD  -Daily BMP, Mg. Strict Is/Os. Daily standing weights
Will continue current insulin regimen for now. Will continue monitoring FS and FU.  Patient with poorly-controlled diabetes, she and daughter are hesitant to starting insulin injections. If she is being DC home, can DC on the following DM regimen:  -Prandin 1mg TID before meals  -Tradjenta 5mg daily  -Discontinue prior hypoglycemic agents (Januvia and glimepiride)   -FU 4 weeks  Discussed plan with patient and daughter. Discussed possible need for insulin in the future, counseled for compliance with consistent low carb diet.
Patient's cardiologist reportedly increased bumex dose to 1BID on multiple days of the week, however, patient has not been taking it.  TTE 1/2022 was limited, but showed mild RV enlargement, mild-mod TR, mod pulm HTN  TTE with impaired E/e' c/w increased filling pressures LV    -Continues to complain of intermittent dyspnea   -Continue Bumex 1mg IV BID - assess need for diuretic daily   -Continue spironolactone 12.5 qD, metoprolol 150 qD  -Daily BMP, Mg. Strict Is/Os. Daily standing weights
Patient's cardiologist reportedly increased bumex dose to 1mg BID on multiple days of the week, however, patient has not been taking it.  TTE 1/2022 was limited, but showed mild RV enlargement, mild-mod TR, mod pulm HTN  TTE with impaired E/e' c/w increased filling pressures LV    -Continues to complain of intermittent dyspnea   -Continue Bumex 1mg IV BID - assess need for diuretic daily   -Continue spironolactone 12.5 qD, metoprolol 150 qD  -Daily BMP, Mg. Strict Is/Os. Daily standing weights
Will continue current insulin regimen for now. Will continue monitoring  blood sugars, will Follow up.  Patient counseled for compliance with consistent low carb diet.
Patient's cardiologist reportedly increased bumex dose to 1mg BID on multiple days of the week, however, patient has not been taking it.  TTE 1/2022 was limited, but showed mild RV enlargement, mild-mod TR, mod pulm HTN  TTE with impaired E/e' c/w increased filling pressures LV    -ProBNP 1038>276  -F/U chest xray ordered 9/12  -Switch to oral Bumex 1mg BID  -Discussed with Dr. Winchester (follows Dr. Hagan outpatient)  -Continue spironolactone 12.5 qD, metoprolol 150 qD  -Daily BMP, Mg. Strict Is/Os. Daily standing weights
Will continue current insulin regimen for now. Will continue monitoring FS and FU.  Patient with poorly-controlled diabetes, she and daughter are hesitant to starting insulin injections. If she is being DC home, can DC on the following DM regimen:  -Prandin 1mg TID before meals  -Tradjenta 5mg daily  -Discontinue prior hypoglycemic agents (Januvia and glimepiride)   -FU 4 weeks  Discussed plan with patient and daughter. Discussed possible need for insulin in the future, counseled for compliance with consistent low carb diet.
Will increase Lantus to 18u at bedtime.  Will increase Admelog to 8u before each meal and continue Admelog correction scale coverage. Will continue monitoring FS and FU.  Patient with poorly-controlled diabetes, she and daughter are hesitant to starting insulin injections. If she is being DC home, can DC on the following DM regimen:  -Prandin 1mg TID before meals  -Tradjenta 5mg daily  -Discontinue prior hypoglycemic agents (Januvia and glimepiride)   -FU 4 weeks  Discussed plan with patient and daughter. Discussed possible need for insulin in the future, counseled for compliance with consistent low carb diet.
Will increase Lantus to 20u at bedtime.  Will increase Admelog to 9u before each meal and continue Admelog correction scale coverage. Will continue monitoring FS and FU.  Patient with poorly-controlled diabetes, she and daughter are hesitant to starting insulin injections. If she is being DC home, can DC on the following DM regimen:  -Prandin 1mg TID before meals  -Tradjenta 5mg daily  -Discontinue prior hypoglycemic agents (Januvia and glimepiride)   -FU 4 weeks  Discussed plan with patient and daughter. Discussed possible need for insulin in the future, counseled for compliance with consistent low carb diet.
Patient's cardiologist reportedly increased bumex dose to 1BID on multiple days of the week, however, patient has not been taking it.  -TTE 1/2022 was limited, but showed mild RV enlargemetn, mild-mod TR, mod pulm HTN  -start bumex 1mg BID   -restart spironolactone 12.5 qD, metoprolol 150 qD  -repeat TTE  -LE swelling improving
Patient's cardiologist reportedly increased bumex dose to 1mg BID on multiple days of the week, however, patient has not been taking it.  TTE 1/2022 was limited, but showed mild RV enlargement, mild-mod TR, mod pulm HTN  TTE with impaired E/e' c/w increased filling pressures LV    -ProBNP 1038>276  -Chest xray PA/lat 9/13: Negative for acute radiographic cardiopulmonary pulmonary pathology.  -Switched to oral Bumex 1mg BID  -Discussed with Dr. Winchester (follows Dr. Hagan outpatient)  -Continue spironolactone 12.5 qD, metoprolol 150 qD  -Daily BMP, Mg. Strict Is/Os. Daily standing weights
Patient's cardiologist reportedly increased bumex dose to 1BID on multiple days of the week, however, patient has not been taking it.  -TTE 1/2022 was limited, but showed mild RV enlargement, mild-mod TR, mod pulm HTN  -bumex 1mg IV BID with improvement, will continue today  -spironolactone 12.5 qD, metoprolol 150 qD  -TTE with impaired E/e' c/w increased filling pressures LV
Patient's cardiologist reportedly increased bumex dose to 1mg BID on multiple days of the week, however, patient has not been taking it.  TTE 1/2022 was limited, but showed mild RV enlargement, mild-mod TR, mod pulm HTN  TTE with impaired E/e' c/w increased filling pressures LV    -Continues to complain of intermittent dyspnea   -Continue Bumex 1mg IV BID   -Consulted cardiologist Dr. Winchester, f/u on recs (follows Dr. Hagan outpatient)  -Continue spironolactone 12.5 qD, metoprolol 150 qD  -Daily BMP, Mg. Strict Is/Os. Daily standing weights

## 2022-09-16 NOTE — PROGRESS NOTE ADULT - SUBJECTIVE AND OBJECTIVE BOX
Chief complaint  Patient is a 82y old  Female who presents with a chief complaint of CHF (12 Sep 2022 17:19)   Review of systems  Patient in bed, looks comfortable, no hypoglycemic episodes.    Labs and Fingersticks  CAPILLARY BLOOD GLUCOSE      POCT Blood Glucose.: 269 mg/dL (16 Sep 2022 11:38)  POCT Blood Glucose.: 173 mg/dL (16 Sep 2022 09:05)  POCT Blood Glucose.: 189 mg/dL (16 Sep 2022 07:49)  POCT Blood Glucose.: 157 mg/dL (15 Sep 2022 21:19)  POCT Blood Glucose.: 182 mg/dL (15 Sep 2022 16:35)      Anion Gap, Serum: 13 (09-16 @ 06:50)  Anion Gap, Serum: 14 (09-15 @ 07:21)      Calcium, Total Serum: 9.2 (09-16 @ 06:50)  Calcium, Total Serum: 9.3 (09-15 @ 07:21)          09-16    136  |  98  |  57<H>  ----------------------------<  135<H>  4.3   |  25  |  2.05<H>    Ca    9.2      16 Sep 2022 06:50                          13.0   8.79  )-----------( 273      ( 15 Sep 2022 07:21 )             43.0     Medications  MEDICATIONS  (STANDING):  amLODIPine   Tablet 5 milliGRAM(s) Oral daily  apixaban 2.5 milliGRAM(s) Oral two times a day  aspirin  chewable 81 milliGRAM(s) Oral daily  atorvastatin 40 milliGRAM(s) Oral at bedtime  BACItracin   Ointment 1 Application(s) Topical two times a day  buMETAnide 1 milliGRAM(s) Oral two times a day  dextrose 5%. 1000 milliLiter(s) (100 mL/Hr) IV Continuous <Continuous>  dextrose 5%. 1000 milliLiter(s) (50 mL/Hr) IV Continuous <Continuous>  dextrose 50% Injectable 25 Gram(s) IV Push once  dextrose 50% Injectable 12.5 Gram(s) IV Push once  dextrose 50% Injectable 25 Gram(s) IV Push once  famotidine    Tablet 20 milliGRAM(s) Oral daily  gabapentin 200 milliGRAM(s) Oral three times a day  glucagon  Injectable 1 milliGRAM(s) IntraMuscular once  insulin glargine Injectable (LANTUS) 20 Unit(s) SubCutaneous at bedtime  insulin lispro (ADMELOG) corrective regimen sliding scale   SubCutaneous three times a day before meals  insulin lispro (ADMELOG) corrective regimen sliding scale   SubCutaneous at bedtime  insulin lispro Injectable (ADMELOG) 9 Unit(s) SubCutaneous three times a day before meals  lidocaine   4% Patch 1 Patch Transdermal every 24 hours  lidocaine   4% Patch 1 Patch Transdermal every 24 hours  metoprolol succinate  milliGRAM(s) Oral daily  polyethylene glycol 3350 17 Gram(s) Oral daily  spironolactone 12.5 milliGRAM(s) Oral daily      Physical Exam  General: Patient comfortable in bed  Vital Signs Last 12 Hrs  T(F): 97.4 (09-16-22 @ 08:56), Max: 97.4 (09-16-22 @ 08:56)  HR: 63 (09-16-22 @ 08:56) (63 - 63)  BP: 126/62 (09-16-22 @ 08:56) (126/62 - 126/62)  BP(mean): --  RR: 18 (09-16-22 @ 08:56) (18 - 18)  SpO2: 92% (09-16-22 @ 08:56) (92% - 92%)  Neck: No palpable thyroid nodules.  CVS: S1S2, No murmurs  Respiratory: No wheezing, no crepitations  GI: Abdomen soft, bowel sounds positive  Musculoskeletal:  edema lower extremities.   Skin: No skin rashes, no ecchymosis    Diagnostics             Chief complaint  Patient is a 82y old  Female who presents with a chief complaint of CHF (12 Sep 2022 17:19)   Review of systems  Patient in bed, looks comfortable, no hypoglycemic episodes.    Labs and Fingersticks  CAPILLARY BLOOD GLUCOSE      POCT Blood Glucose.: 269 mg/dL (16 Sep 2022 11:38)  POCT Blood Glucose.: 173 mg/dL (16 Sep 2022 09:05)  POCT Blood Glucose.: 189 mg/dL (16 Sep 2022 07:49)  POCT Blood Glucose.: 157 mg/dL (15 Sep 2022 21:19)  POCT Blood Glucose.: 182 mg/dL (15 Sep 2022 16:35)      Anion Gap, Serum: 13 (09-16 @ 06:50)  Anion Gap, Serum: 14 (09-15 @ 07:21)      Calcium, Total Serum: 9.2 (09-16 @ 06:50)  Calcium, Total Serum: 9.3 (09-15 @ 07:21)          09-16    136  |  98  |  57<H>  ----------------------------<  135<H>  4.3   |  25  |  2.05<H>    Ca    9.2      16 Sep 2022 06:50                          13.0   8.79  )-----------( 273      ( 15 Sep 2022 07:21 )             43.0     Medications  MEDICATIONS  (STANDING):  amLODIPine   Tablet 5 milliGRAM(s) Oral daily  apixaban 2.5 milliGRAM(s) Oral two times a day  aspirin  chewable 81 milliGRAM(s) Oral daily  atorvastatin 40 milliGRAM(s) Oral at bedtime  BACItracin   Ointment 1 Application(s) Topical two times a day  buMETAnide 1 milliGRAM(s) Oral two times a day  dextrose 5%. 1000 milliLiter(s) (100 mL/Hr) IV Continuous <Continuous>  dextrose 5%. 1000 milliLiter(s) (50 mL/Hr) IV Continuous <Continuous>  dextrose 50% Injectable 25 Gram(s) IV Push once  dextrose 50% Injectable 12.5 Gram(s) IV Push once  dextrose 50% Injectable 25 Gram(s) IV Push once  famotidine    Tablet 20 milliGRAM(s) Oral daily  gabapentin 200 milliGRAM(s) Oral three times a day  glucagon  Injectable 1 milliGRAM(s) IntraMuscular once  insulin glargine Injectable (LANTUS) 20 Unit(s) SubCutaneous at bedtime  insulin lispro (ADMELOG) corrective regimen sliding scale   SubCutaneous three times a day before meals  insulin lispro (ADMELOG) corrective regimen sliding scale   SubCutaneous at bedtime  insulin lispro Injectable (ADMELOG) 9 Unit(s) SubCutaneous three times a day before meals  lidocaine   4% Patch 1 Patch Transdermal every 24 hours  lidocaine   4% Patch 1 Patch Transdermal every 24 hours  metoprolol succinate  milliGRAM(s) Oral daily  polyethylene glycol 3350 17 Gram(s) Oral daily  spironolactone 12.5 milliGRAM(s) Oral daily      Physical Exam  General: Patient comfortable in bed  Vital Signs Last 12 Hrs  T(F): 97.4 (09-16-22 @ 08:56), Max: 97.4 (09-16-22 @ 08:56)  HR: 63 (09-16-22 @ 08:56) (63 - 63)  BP: 126/62 (09-16-22 @ 08:56) (126/62 - 126/62)  BP(mean): --  RR: 18 (09-16-22 @ 08:56) (18 - 18)  SpO2: 92% (09-16-22 @ 08:56) (92% - 92%)  Neck: No palpable thyroid nodules.  CVS: S1S2, No murmurs  Respiratory: No wheezing, no crepitations  GI: Abdomen soft, bowel sounds positive  Musculoskeletal:  edema lower extremities.   Skin: No skin rashes, no ecchymosis    Diagnostics

## 2022-09-16 NOTE — PROGRESS NOTE ADULT - PROBLEM SELECTOR PLAN 2
-CT showing "Suggestion of chronic and severe right-sided hydronephrosis as on 11/3/2020 CT chest"  -Renal u/s with chronic severe R sided hydronephrosis  >      >Renal Lasix scan done: No evidence of a functioning right kidney > Re-consulted urology team for further recs, they are aware and recommend follow-up with Dr. Montenegro outpatient.   -Monitor bladder scans for PVR. Chen placed for Lasix scan, remove chen and TOV 9/15.    #UTI: patient complaining of urinary symptoms and UA + with urine culture grew e coli >100k,  now sensitivities showing ESBL   -Started Ceftriaxone 9/10, Switched to Ertapenem 9/12 and finished course of x 5days

## 2022-09-17 NOTE — CHART NOTE - NSCHARTNOTEFT_GEN_A_CORE
82F w/ PMH of CHF and admitted for SOB and worsening b/l LE swelling over the last 3 days found to have rising SCr with CT and US showing severe chronic R hydronephrosis previously on CT from 2020 and u/s from 2015. Pt known to Dr. Montenegro and she is aware of nonfunctioning kidney. Renal lasix scan reviewed.    < from: NM Renal/Lasix (NM Renal/Lasix .) (09.13.22 @ 15:18) >    IMPRESSION: Abnormal diuretic renal scan.    Mildly diminished flow to and function of the left kidney with no   evidence of obstruction.    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    < end of copied text >    - No obstruction, continue care and recommendations per nephrology   - Continue to treat UTI  - No acute  intervention   - Follow up with Dr. Montenegro as outpatient     [facility]  [address] [place] [id number] [phone]
Niuean  used Nova ID # [id number]  Left arm pain    Notified by RN that patient was complaining of left arm pain. Patient reports that her left arm has been hurting for one week "the pain from the needles are hurting me" The patient reports the entire arm is hurting, mild redness around saline lock site that was removed from the left forearm yesterday, temperatures in both upper extremities are similar. Patient has limited ROM to left shoulder.     Vital Signs Last 24 Hrs  T(C): 37.1 (13 Sep 2022 23:16), Max: 37.1 (13 Sep 2022 23:16)  T(F): 98.7 (13 Sep 2022 23:16), Max: 98.7 (13 Sep 2022 23:16)  HR: 68 (14 Sep 2022 05:15) (65 - 68)  BP: 133/52 (14 Sep 2022 05:15) (122/66 - 136/62)  BP(mean): --  RR: 18 (14 Sep 2022 05:15) (17 - 18)  SpO2: 93% (14 Sep 2022 05:15) (92% - 96%)    Parameters below as of 14 Sep 2022 05:15  Patient On (Oxygen Delivery Method): room air    Plan  left shoulder x-ray  left upper extremity duplex  Tylenol   lidocaine patch to left shoulder    Will endorse to primary day team, attending to follow    Delaney aparicioalink [id number]
Pt seen and assessed at bedside, expressing concern about feeling of numbness in R leg on day of discharge. Pt seen with daughter present, and per daughter, pt has been complaining of this throughout admission. Daughter translated for pt. Pt was walking with PT earlier in the day with no pain or numbness/tingling. RN walked pt around unit, and no foot drop or unsteady gait noted. Discussed pt's concerns with Dr. Almaraz medical attending. Per Dr. Almaraz, it is chronic and intermittent, and pt can f/u with her PCP within 1 week for further workup. No acute changes in neuro exam noted. Pt with full sensation in b/l LE, 5/5 strength b/l LE, and able to lift both legs without c/o pain. She takes Gabapentin for diabetic neuropathy at home, and it is recommended to continue medication on discharge.    Kirti Mccann PA-C  [id number]
Patient requested call back on (9/19).

## 2022-09-17 NOTE — CHART NOTE - NSCHARTNOTESELECT_GEN_ALL_CORE
D/C Appt/Event Note
MyChart message reviewed by patient. See new MyChart message from patient's mother.
Event Note
Medicine NP/Event Note
Urology/Event Note

## 2022-09-22 ENCOUNTER — APPOINTMENT (OUTPATIENT)
Dept: ENDOCRINOLOGY | Facility: CLINIC | Age: 82
End: 2022-09-22

## 2022-09-22 ENCOUNTER — MED ADMIN CHARGE (OUTPATIENT)
Age: 82
End: 2022-09-22

## 2022-09-22 ENCOUNTER — NON-APPOINTMENT (OUTPATIENT)
Age: 82
End: 2022-09-22

## 2022-09-22 ENCOUNTER — APPOINTMENT (OUTPATIENT)
Dept: CARDIOLOGY | Facility: CLINIC | Age: 82
End: 2022-09-22
Payer: MEDICARE

## 2022-09-22 ENCOUNTER — APPOINTMENT (OUTPATIENT)
Dept: INTERNAL MEDICINE | Facility: CLINIC | Age: 82
End: 2022-09-22

## 2022-09-22 VITALS
DIASTOLIC BLOOD PRESSURE: 62 MMHG | TEMPERATURE: 96.6 F | BODY MASS INDEX: 36.44 KG/M2 | OXYGEN SATURATION: 92 % | RESPIRATION RATE: 14 BRPM | HEIGHT: 61 IN | SYSTOLIC BLOOD PRESSURE: 94 MMHG | WEIGHT: 193 LBS | HEART RATE: 72 BPM

## 2022-09-22 PROCEDURE — 99214 OFFICE O/P EST MOD 30 MIN: CPT

## 2022-09-22 PROCEDURE — 99214 OFFICE O/P EST MOD 30 MIN: CPT | Mod: 25

## 2022-09-22 PROCEDURE — 93000 ELECTROCARDIOGRAM COMPLETE: CPT

## 2022-09-22 RX ORDER — GLIMEPIRIDE 2 MG/1
2 TABLET ORAL
Qty: 90 | Refills: 1 | Status: DISCONTINUED | COMMUNITY
Start: 2018-02-08 | End: 2022-09-22

## 2022-10-19 ENCOUNTER — NON-APPOINTMENT (OUTPATIENT)
Age: 82
End: 2022-10-19

## 2022-10-19 ENCOUNTER — APPOINTMENT (OUTPATIENT)
Dept: CARDIOLOGY | Facility: CLINIC | Age: 82
End: 2022-10-19
Payer: MEDICARE

## 2022-10-19 VITALS
WEIGHT: 196 LBS | HEIGHT: 61 IN | DIASTOLIC BLOOD PRESSURE: 92 MMHG | OXYGEN SATURATION: 97 % | HEART RATE: 88 BPM | RESPIRATION RATE: 14 BRPM | BODY MASS INDEX: 37 KG/M2 | SYSTOLIC BLOOD PRESSURE: 149 MMHG | TEMPERATURE: 97.5 F

## 2022-10-19 VITALS — DIASTOLIC BLOOD PRESSURE: 68 MMHG | SYSTOLIC BLOOD PRESSURE: 118 MMHG

## 2022-10-19 PROCEDURE — 93000 ELECTROCARDIOGRAM COMPLETE: CPT

## 2022-10-19 PROCEDURE — 99214 OFFICE O/P EST MOD 30 MIN: CPT | Mod: 25

## 2022-10-31 ENCOUNTER — APPOINTMENT (OUTPATIENT)
Dept: UROLOGY | Facility: CLINIC | Age: 82
End: 2022-10-31

## 2022-11-10 NOTE — PROGRESS NOTE ADULT - PROBLEM SELECTOR PROBLEM 7
Immediate Brief Procedure Note    Patient: Jay Dalton    Pre-op Diagnosis: ascites     Post-op Diagnosis: Same    Procedure: right paracentesis     Proceduralist: Gayatri Giron PA-C    Assistants: none     Anesthesia Staff: No anesthesia staff entered.    Anesthesia Type: local     Findings: 8.85 L cloudy serous fluid removed     Estimated Blood Loss: < 5 cc     Complications: none immediately     Specimens Removed: 8.85 L cloudy serous fluid removed     Under direct supervision of Dr. Sullivan  
Anemia secondary to renal failure

## 2022-11-15 LAB
ALBUMIN SERPL ELPH-MCNC: 4 G/DL
ALP BLD-CCNC: 100 U/L
ALT SERPL-CCNC: 9 U/L
ANION GAP SERPL CALC-SCNC: 17 MMOL/L
AST SERPL-CCNC: 11 U/L
BASOPHILS # BLD AUTO: 0.03 K/UL
BASOPHILS NFR BLD AUTO: 0.3 %
BILIRUB SERPL-MCNC: 0.3 MG/DL
BUN SERPL-MCNC: 33 MG/DL
CALCIUM SERPL-MCNC: 9.4 MG/DL
CHLORIDE SERPL-SCNC: 100 MMOL/L
CHOLEST SERPL-MCNC: 135 MG/DL
CO2 SERPL-SCNC: 25 MMOL/L
CREAT SERPL-MCNC: 1.81 MG/DL
EGFR: 27 ML/MIN/1.73M2
EOSINOPHIL # BLD AUTO: 0.03 K/UL
EOSINOPHIL NFR BLD AUTO: 0.3 %
ESTIMATED AVERAGE GLUCOSE: 240 MG/DL
GLUCOSE SERPL-MCNC: 295 MG/DL
HBA1C MFR BLD HPLC: 10 %
HCT VFR BLD CALC: 42.6 %
HDLC SERPL-MCNC: 47 MG/DL
HGB BLD-MCNC: 13 G/DL
IMM GRANULOCYTES NFR BLD AUTO: 0.3 %
LDLC SERPL CALC-MCNC: 52 MG/DL
LYMPHOCYTES # BLD AUTO: 1.49 K/UL
LYMPHOCYTES NFR BLD AUTO: 16.5 %
MAN DIFF?: NORMAL
MCHC RBC-ENTMCNC: 28.8 PG
MCHC RBC-ENTMCNC: 30.5 GM/DL
MCV RBC AUTO: 94.5 FL
MONOCYTES # BLD AUTO: 0.62 K/UL
MONOCYTES NFR BLD AUTO: 6.9 %
NEUTROPHILS # BLD AUTO: 6.83 K/UL
NEUTROPHILS NFR BLD AUTO: 75.7 %
NONHDLC SERPL-MCNC: 87 MG/DL
NT-PROBNP SERPL-MCNC: 839 PG/ML
PLATELET # BLD AUTO: 309 K/UL
POTASSIUM SERPL-SCNC: 5 MMOL/L
PROT SERPL-MCNC: 7.2 G/DL
RBC # BLD: 4.51 M/UL
RBC # FLD: 15.9 %
SODIUM SERPL-SCNC: 142 MMOL/L
TRIGL SERPL-MCNC: 175 MG/DL
TSH SERPL-ACNC: 1.46 UIU/ML
WBC # FLD AUTO: 9.03 K/UL

## 2022-11-15 RX ORDER — GABAPENTIN 600 MG/1
600 TABLET, FILM COATED ORAL
Qty: 30 | Refills: 1 | Status: ACTIVE | COMMUNITY
Start: 1900-01-01 | End: 1900-01-01

## 2022-12-17 NOTE — ED ADULT NURSE NOTE - SUICIDE SCREENING QUESTION 1
[facility] Medicine Services  DISCHARGE SUMMARY    Patient Name: Fabian Manjarrez  : 1956  MRN: [id number]    Date of Admission: 12/10/2022  Discharge Diagnosis: Atrial fibrillation with RVR/acute on chronic respiratory failure with hypercapnia.  Date of Discharge: 2022  Primary Care Physician: Addison Burdick MD      Presenting Problem:   Atrial fibrillation with rapid ventricular response (HCC) [I48.91]    Active and Resolved Hospital Problems:  Active Hospital Problems    Diagnosis POA   • **Atrial fibrillation with rapid ventricular response (HCC) [I48.91] Yes     Priority: High   • Acute on chronic respiratory failure with hypercapnia (HCC) [J96.22] Yes     Priority: High   • Chronic HFrEF (heart failure with reduced ejection fraction) (HCC) [I50.22] Yes   • Physical debility [R53.81] Yes   • COPD (chronic obstructive pulmonary disease) (HCC) [J44.9] Yes   • Primary hypertension [I10] Yes   • Coronary artery disease involving autologous vein coronary bypass graft without angina pectoris [I25.810] Yes   • GERD without esophagitis [K21.9] Yes   • Primary osteoarthritis involving multiple joints [M15.9] Yes   • Mixed hyperlipidemia [E78.2] Yes      Resolved Hospital Problems   No resolved problems to display.         Hospital Course   From previous notes and with minor updates.    Hospital Course:      Patient is a 66 y.o. male with past medical history of hypertension, chronic coronary artery disease, GERD, COPD, hyperlipidemia, osteoarthritis who presented to [facility] on 12/10/2022 complaining of above.   Notified of transfer arrival to PCU unit by nursing staff.  Patient was accepted for transfer from [facility] ED earlier today by Dr. Avelar for A. fib with RVR and acute on chronic hypoxic respiratory failure with possible pulmonary edema.  History is somewhat challenging though he is alert oriented x3 and able to provide history, he  sometimes gets lost in the information that he provides and what timing of events.  He has extensive past medical history-see assessment/plan for details.  Per available outside records the following was found.  He was admitted to [facility] between 12/7/2022 through 12/9/2022 and managed for acute COPD exacerbation, possible atypical pneumonia, and WOLF related to obstructive uropathy.  He was not on supplemental oxygen prior to this time though was discharged with 2 L nasal cannula and a course of empiric antibiotics.  VQ scan during that hospitalization was low probability of PE.  He had mild WOLF with a creatinine of 2. This improved to discharge creatinine of 1.4 with medical management and after pop cathether placement for obstructive uropathy.  He was able to void after the Pop catheter was removed and he was discharged with Flomax with recommendations to follow-up with urology due to questionable filling defect related to the prostate versus bladder mass measuring 1.5 cm in size.  Outpatient follow-up with urology was recommended  He went home and over the course of 24 hours still had persistent shortness of breath which prompted his presentation again to the emergency room.  At this time he was found to be in A. fib with RVR and was initiated on amiodarone and given IV Lasix 40 mg x 1. Chest x-ray revealed stable diffuse interstitial changes bilaterally with stable patchy multifocal airspace infiltrates. His white blood cell count was 20 hemoglobin was 9.7 platelets were 352.  Sodium was 135 creatinine was 1.2.  Albumin was 3.  BNP was 1095. Troponin I was 0.07.  Influenza panel COVID-19 and RSV were negative.  Transfer to tertiary facility was requested.     On arrival to our facility he is on 6 L nasal cannula.  He denies any active chest pain though reports 8 out of 10 headache that is more right-sided with some sensitivity to light.  He describes a history of migraine headaches every few  weeks and this is similar.  No focal deficits are noted.  Mild nausea as described.  She denies sinus tenderness.  No rhinorrhea.  Endorses generalized myalgias.  Denies hemoptysis.  Denies travel history.  No known sick contacts.  Cardiology initiated heparin drip on arrival.  Atrial fibrillation with RVR was treated with amiodarone and Xarelto.  Coronary artery disease was treated with aspirin.  Iron deficiency anemia was treated with ferrous sulfate.  Depression was treated with fluoxetine.  Oral thrush was treated with nystatin.  GERD was treated with Protonix.  Hyperlipidemia was treated with Crestor.  BPH was treated with Flomax.  Urology consult was completed.  Pulmonary consult was completed.  Acute respiratory distress with hypercapnia was treated with oxygen therapy as needed.  Patient reported complete resolution of his symptoms after over 7 days in the hospital and requested to be discharged home.  Patient was advised to take his medications as prescribed.  The discharge medications are as per medication reconciliation list.  Patient was advised to follow-up with his primary care physician within 3 to 5 days of discharge.  Patient was advised to follow-up with his a urologist within 7 days of discharge.  Patient was advised to follow-up with his cardiologist within 14 days of discharge.  Patient was advised to follow-up with his pulmonologist within 7 days of discharge.  Patient was advised to return to the emergency department if he experiences any recurrence of his symptoms.  Patient and family agreed with the plan and patient was discharged in stable condition.      DISCHARGE Follow Up Recommendations for labs and diagnostics:     Patient was advised to follow-up with his primary care physician who will review his current medications.    Patient was advised to follow-up with his cardiologist who will reassess his cardiac function.    Patient was advised to follow-up with his a urologist who will  reassess his urogenital system and the BPH.    Patient was advised to follow-up with his pulmonologist to reassess his pulmonary function.          Reasons For Change In Medications and Indications for New Medications:      Day of Discharge     Vital Signs:  Temp:  [98 °F (36.7 °C)-98.6 °F (37 °C)] 98.3 °F (36.8 °C)  Heart Rate:  [72-85] 73  Resp:  [11-16] 16  BP: (105-123)/(54-63) 118/63  Flow (L/min):  [3.5-4] 4    Physical Exam:  Physical Exam  Vitals reviewed.   Constitutional:       General: He is not in acute distress.  HENT:      Head: Normocephalic.      Nose: Nose normal.      Mouth/Throat:      Mouth: Mucous membranes are dry.      Pharynx: Oropharynx is clear.   Eyes:      Extraocular Movements: Extraocular movements intact.      Conjunctiva/sclera: Conjunctivae normal.      Pupils: Pupils are equal, round, and reactive to light.   Cardiovascular:      Pulses: Normal pulses.      Heart sounds: No murmur heard.    No friction rub. No gallop.      Comments: S1 and S2 present.  No tachycardia.  Pulmonary:      Effort: Pulmonary effort is normal.      Breath sounds: No stridor. No wheezing or rales.   Chest:      Chest wall: No tenderness.   Abdominal:      General: Bowel sounds are normal. There is no distension.      Palpations: Abdomen is soft.      Tenderness: There is no abdominal tenderness. There is no right CVA tenderness or guarding.   Musculoskeletal:         General: No swelling, tenderness, deformity or signs of injury.      Cervical back: Normal range of motion. No rigidity.      Right lower leg: No edema.      Left lower leg: No edema.   Skin:     General: Skin is warm and dry.      Capillary Refill: Capillary refill takes less than 2 seconds.      Coloration: Skin is not jaundiced.      Findings: No bruising, erythema, lesion or rash.   Neurological:      Mental Status: He is alert.      Comments: No facial asymmetry noted.  Gait and station not tested.   Psychiatric:      Comments: No  agitation.              Pertinent  and/or Most Recent Results     LAB RESULTS:      Lab 12/16/22  2355 12/16/22  0027 12/15/22  0034 12/14/22  0027 12/13/22  1210 12/13/22  0450 12/12/22  2239 12/12/22  1439 12/12/22  0457 12/11/22  0550 12/11/22  0042   WBC 19.00*  --  23.40*  --   --   --  21.90*  --  14.30*  --  11.60*   HEMOGLOBIN 8.9*  --  8.9*  --   --   --  9.7*  --  9.7*  --  8.7*   HEMATOCRIT 27.5*  --  27.8*  --   --   --  30.1*  --  28.8*  --  26.2*   PLATELETS 305  --  338  --   --   --  317  --  266  --  300   NEUTROS ABS 14.25*  --  19.66*  --   --   --  19.49*  --  12.58*  --  8.82*   MCV 96.2  --  95.2  --   --   --  96.0  --  93.7  --  94.3   PROCALCITONIN  --   --   --   --   --   --   --   --   --   --  0.11   PROTIME  --   --   --   --   --   --   --   --   --   --  11.1   APTT 29.7* 29.0* 27.2* 26.2* 35.2*   < > 63.2   < > 44.1*   < > 25.9*    < > = values in this interval not displayed.         Lab 12/17/22 0445 12/12/22 0457 12/11/22 0042   SODIUM 132* 134* 135*   POTASSIUM 4.4 4.4 3.9   CHLORIDE 99 98 97*   CO2 27.0 24.0 24.0   ANION GAP 6.0 12.0 14.0   BUN 22 18 17   CREATININE 1.04 0.90 1.15   EGFR 78.2 93.0 69.3   GLUCOSE 98 159* 92   CALCIUM 8.5* 8.4* 8.2*         Lab 12/17/22 0445 12/11/22 0042   TOTAL PROTEIN 5.1* 5.7*   ALBUMIN 2.70* 2.90*   GLOBULIN 2.4 2.8   ALT (SGPT) 17 12   AST (SGOT) 21 27   BILIRUBIN 0.2 0.4   ALK PHOS 158* 174*         Lab 12/12/22  0457 12/11/22  0752 12/11/22  0042   PROBNP  --   --  8,457.0*   TROPONIN T 0.027 0.044* 0.056*   PROTIME  --   --  11.1   INR  --   --  1.08                 Brief Urine Lab Results     None        Microbiology Results (last 10 days)     Procedure Component Value - Date/Time    Respiratory Culture - Sputum, Cough [[id number]]  (Abnormal) Collected: 12/14/22 2206    Lab Status: Preliminary result Specimen: Sputum from Cough Updated: 12/16/22 141     Respiratory Culture Light growth (2+) Pseudomonas species      Scant growth (1+)  Normal Respiratory Melinda     Gram Stain Moderate (3+) WBCs per low power field      Rare (1+) Epithelial cells per low power field      Rare (1+) Mixed bacterial morphotypes seen on Gram Stain          CT Head Without Contrast    Result Date: 12/11/2022  Impression:  1. No acute intracranial pathology is identified.  Electronically Signed By-Francis Wynn MD On:12/11/2022 9:34 AM This report was finalized on [id number] by  Francis Wynn MD.    CT Chest Without Contrast Diagnostic    Result Date: 12/12/2022  Impression: 1.     No evidence for acute intrathoracic abnormality. 2.     Extensive changes throughout the lungs most consistent with advanced emphysema/COPD with a component of mild peripheral fibrosis. Other underlying chronic interstitial lung disease or cystic lung disease could be considered. 3.     Granulomatous inflammatory changes are noted with scattered calcified granulomas bilaterally. No dominant suspicious nodule or mass is seen.  Electronically Signed By-Jenaro Timmons MD On:12/12/2022 4:33 PM This report was finalized on [id number] by  Jenaro Timmons MD.    XR Chest 1 View    Result Date: 12/15/2022  Impression:  1. No acute chest findings. Aeration in both lungs appears improved compared to the chest x-ray from 12/11/2022.  2. Features of interstitial pulmonary fibrosis, similar to the more remote 12/7/2022 examination.  Electronically Signed By-Melinda Nunn MD On:12/15/2022 12:44 PM This report was finalized on [id number] by  Melinda Nunn MD.    XR Chest 1 View    Result Date: 12/11/2022  Impression: 1.  Unchanged since radiograph yesterday. Diffuse interstitial opacities and superimposed small nodular opacities again demonstrated. The interstitial opacities are increased from December 7, 2022 exam. The change may represent acute component of interstitial lung disease, superimposed edema, or atypical pneumonia. Electronically signed by:  Roberto Booth M.D.  12/10/2022 11:35 PM  [place]              Results for orders placed during the hospital encounter of 12/10/22    Adult Transthoracic Echo Complete W/ Cont if Necessary Per Protocol    Interpretation Summary  •  Left ventricular systolic function is mildly decreased. Estimated left ventricular EF = 40%  •  Left ventricular diastolic dysfunction is noted.  •  Severely reduced right ventricular systolic function noted.  •  The right ventricular cavity is severely dilated.  •  The right atrial cavity is mild to moderately  dilated.  •  Mild aortic valve stenosis is present.      Labs Pending at Discharge:  Pending Labs     Order Current Status    TOMY by IFA, Reflex 9-biomarkers profile In process    ANCA Panel In process    Scleroderma Diagnostic Profile In process    Respiratory Culture - Sputum, Cough Preliminary result          Procedures Performed           Consults:   Consults     Date and Time Order Name Status Description    12/12/2022  8:06 AM Inpatient Urology Consult Completed     12/10/2022 11:45 PM Inpatient Pulmonology Consult Completed     12/10/2022 11:30 PM Inpatient Cardiology Consult Completed             Discharge Details        Discharge Medications      New Medications      Instructions Start Date   amiodarone 200 MG tablet  Commonly known as: PACERONE   200 mg, Oral, Every 12 Hours Scheduled      lisinopril 5 MG tablet  Commonly known as: PRINIVIL,ZESTRIL   5 mg, Oral, Daily      nystatin 100,000 unit/mL suspension  Commonly known as: MYCOSTATIN   500,000 Units, Swish & Swallow, 4 Times Daily      rivaroxaban 20 MG tablet  Commonly known as: XARELTO   20 mg, Oral, Daily With Dinner      sulfamethoxazole-trimethoprim 800-160 MG per tablet  Commonly known as: BACTRIM DS,SEPTRA DS   1 tablet, Oral, Every 12 Hours Scheduled         Continue These Medications      Instructions Start Date   albuterol sulfate  (90 Base) MCG/ACT inhaler  Commonly known as: PROVENTIL HFA;VENTOLIN HFA;PROAIR HFA   1 puff,  Inhalation, Every 4 Hours PRN      aspirin 81 MG EC tablet   81 mg, Oral, Daily      buPROPion  MG 12 hr tablet  Commonly known as: WELLBUTRIN SR   100 mg, Oral, Daily      docusate sodium 100 MG capsule  Commonly known as: COLACE   100 mg, Oral, Daily PRN      ezetimibe 10 MG tablet  Commonly known as: ZETIA   10 mg, Oral, Daily      ferrous sulfate 325 (65 FE) MG tablet   40 mg, Oral, Daily With Breakfast      FLUoxetine 40 MG capsule  Commonly known as: PROzac   40 mg, Oral, Daily      guaiFENesin 600 MG 12 hr tablet  Commonly known as: MUCINEX   600 mg, Oral, Daily      HYDROcodone-acetaminophen  MG per tablet  Commonly known as: NORCO   1 tablet, Oral, Every 4 Hours PRN      metoprolol tartrate 25 MG tablet  Commonly known as: LOPRESSOR   25 mg, Oral, 2 Times Daily      pantoprazole 40 MG EC tablet  Commonly known as: PROTONIX   40 mg, Oral, 2 Times Daily      predniSONE 5 MG tablet  Commonly known as: DELTASONE   5 mg, Oral, Daily      pregabalin 75 MG capsule  Commonly known as: LYRICA   75 mg, Oral, 2 Times Daily      ranolazine 1000 MG 12 hr tablet  Commonly known as: RANEXA   1,000 mg, Oral, 2 Times Daily      rosuvastatin 20 MG tablet  Commonly known as: CRESTOR   20 mg, Oral, Nightly      tamsulosin 0.4 MG capsule 24 hr capsule  Commonly known as: FLOMAX   1 capsule, Oral, Daily      vitamin B-12 1000 MCG tablet  Commonly known as: CYANOCOBALAMIN   1,000 mcg, Oral, Daily             Allergies   Allergen Reactions   • Leflunomide Diarrhea   • Methotrexate Diarrhea   • Nitroglycerin Headache   • Nsaids GI Bleeding   • Plaquenil [Hydroxychloroquine] Diarrhea     Weight loss         Discharge Disposition: Stable.  Home or Self Care    Diet:  Hospital:  Diet Order   Procedures   • Diet: Regular/House Diet; Texture: Regular Texture (IDDSI 7); Fluid Consistency: Thin (IDDSI 0)         Discharge Activity: As tolerated.        CODE STATUS:  Code Status and Medical Interventions:   Ordered at: 12/11/22  0250     Code Status (Patient has no pulse and is not breathing):    CPR (Attempt to Resuscitate)     Medical Interventions (Patient has pulse or is breathing):    Full Support     Release to patient:    Routine Release         No future appointments.    Additional Instructions for the Follow-ups that You Need to Schedule     Ambulatory Referral to Home Health (Hospital)   As directed      Face to Face Visit Date: 12/15/2022    Follow-up provider for Plan of Care?: I treated the patient in an acute care facility and will not continue treatment after discharge.    Follow-up provider: LEA ABREU [[id number]]    Reason/Clinical Findings: Weakness, A-fib    Describe mobility limitations that make leaving home difficult: weakness, a-fib, respiratiry failure    Nursing/Therapeutic Services Requested: Skilled Nursing Physical Therapy    Skilled nursing orders: Medication education (eval and treat post-hosptialization) Cardiopulmonary assessments    PT orders:  (eval and treat)    Frequency: 1 Week 1               Time spent on Discharge including face to face service: 55 minutes    This patient has been examined wearing appropriate Personal Protective Equipment and discussed with hospital infection control department, [facility] department, infectious disease specialist and pulmonologist. 12/17/22      Signature: Electronically signed by Delvin Da Silva MD, FACP, 12/17/22, 9:11 AM EST.     No

## 2022-12-19 NOTE — PLAN
[FreeTextEntry1] : Follow-up\par \par HTN/HLD/HF/DVT history \par Follows with Dr. Hagan\par cont Amlodipine 5 mg daily\par cont Metoprolol 150 mg daily\par cont Bumetanide 1 mg daily\par cont Spironolactone 25 mg 1/2 tab daily\par cont Atorvastatin 40 mg daily\par Cont ASA 81 mg daily\par cont Eliquis 2.5 mg daily\par \par 2. Diabetes-\par Low carb, low sugar diet/ increased physical activity\par cont Januvia 25 mg daily\par Prandin 1 mg TID \par Endocrine follow up\par \par 3. CKD \par Follows with Dr. Kan ( Nephrology) every 6 months \par \par \par

## 2022-12-19 NOTE — PHYSICAL EXAM

## 2022-12-19 NOTE — HISTORY OF PRESENT ILLNESS
[de-identified] : Patient is an 82 year old female accompanied by her daughter with a history of HTN, hyperlipidemia, type 2 diabetes mellitus, breast cancer status post right partial mastectomy, rectal carcinoma status post resection, anemia, diastolic heart failure, right lower extremity DVT, CAD s/p 2 vessel CABG 11/9/2020 and renal insufficiency who presents today for follow up\par She iss following with endo Dr. Monroy for DM > curently on Prandin 1 mg TID/ Januvia / off Glimepiride \par Also following with nephrologist for CKD \par She feels well\par She has been doing better with her diet. \par Denies CP, SOB, HA, N/V or abdominal pain

## 2023-01-02 NOTE — PHYSICAL EXAM
[General Appearance - Well Developed] : well developed [Normal Appearance] : normal appearance [Well Groomed] : well groomed [General Appearance - Well Nourished] : well nourished [No Deformities] : no deformities [General Appearance - In No Acute Distress] : no acute distress [Normal Conjunctiva] : the conjunctiva exhibited no abnormalities [Respiration, Rhythm And Depth] : normal respiratory rhythm and effort [Exaggerated Use Of Accessory Muscles For Inspiration] : no accessory muscle use [Auscultation Breath Sounds / Voice Sounds] : lungs were clear to auscultation bilaterally [Normal Rate] : normal [Rhythm Regular] : regular [Normal S1] : normal S1 [Normal S2] : normal S2 [II] : a grade 2 [___ +] : bilateral [unfilled]U+ pretibial pitting edema [Bowel Sounds] : normal bowel sounds [Abdomen Soft] : soft [Abdomen Tenderness] : non-tender [Nail Clubbing] : no clubbing of the fingernails [Cyanosis, Localized] : no localized cyanosis [Petechial Hemorrhages (___cm)] : no petechial hemorrhages [Skin Color & Pigmentation] : normal skin color and pigmentation [] : no rash [No Skin Ulcers] : no skin ulcer [Oriented To Time, Place, And Person] : oriented to person, place, and time [Affect] : the affect was normal [Mood] : the mood was normal [No Anxiety] : not feeling anxious [Right Carotid Bruit] : no bruit heard over the right carotid [Left Carotid Bruit] : no bruit heard over the left carotid [Bruit] : no bruit heard [FreeTextEntry1] : Her right leg is erythematous, but not warm.

## 2023-01-02 NOTE — HISTORY OF PRESENT ILLNESS
[FreeTextEntry1] : Patient is an 82 year old woman with a history of HTN, hyperlipidemia, type 2 diabetes mellitus, breast cancer status post right partial mastectomy, rectal carcinoma status post resection, anemia, hypokalemia, diastolic heart failure, right lower extremity DVT, CAD s/p 2 vessel CABG 11/9/2020 and renal insufficiency who presents today for follow up of HTN and heart failure. Her daughter states that she has been taking Bumex once a day unless her weight goes up at which time she takes an extra tablet, which she has done no more than twice a week. She complains of subjective dyspnea with exertion, however, her daughter states that she appears to be comfortable when she complains of the shortness of breath. She states that she has not taken her antihypertensive medications yet. She otherwise denies any chest pain, palpitations, dyspnea at rest, headaches, or dizziness. She will be going for left cataract surgery soon.          \par \par

## 2023-01-02 NOTE — CARDIOLOGY SUMMARY
[___] : [unfilled] [de-identified] : 9/22/2022: Sinus Rhythm at 69 bpm with a first degree AV block, low voltage QRS, and an old anteroseptal infarct\par  [de-identified] : 9/9/2022: EF ([place]onado Rule): 62 %. Mild mitral regurgitation. Mildly dilated left atrium. Left ventricle suboptimally visualized despite\par intravenous ultrasonic enhancing agent; the apex is foreshortened on apical images. Overall normal left ventricular systolic function. Grossly normal right ventricular size and systolic function. Mild tricuspid regurgitation. PASP= 35 mmHg, consistent with borderline pulmonary hypertension.

## 2023-01-02 NOTE — DISCUSSION/SUMMARY
[FreeTextEntry1] : IMPRESSION: Ms. Batista is an 82 year old woman with a history of HTN, hyperlipidemia, type 2 diabetes mellitus, breast cancer status post right partial mastectomy, rectal carcinoma status post resection, diastolic heart failure, anemia, hypokalemia, CAD s/p 2-vessel CABG 11/9/2020, and renal insufficiency who presents today for follow up of HTN and heart failure.\par \par PLAN:\par 1. She is s/p CABG 11/2020 and has not experienced any chest pain since her surgery.  She will continue on Aspirin 81 mg daily. Her ECG that was performed today revealed sinus rhythm without any ectopy and was relatively unchanged from previous.  Her daughter states that her weight has been stable at home and is stable in the office. She will continue on Bumex 1 mg daily and an addition 1 mg each day of her weight trends up. She will have a CMP and pro-BNP checked today. Her daughter will continue to follow her weights.  We should aim for a weight of at least around 190 pounds.  She will also continue on Spironolactone 12.5 mg daily as long as her potassium and creatinine are stable. She will also continue on a low sodium diet and restrict her fluid intake. \par 2. She will continue on Eliquis 2.5 mg twice daily for her DVT and she will continue to follow up with Vascular Cardiology. \par 3. Her blood pressure is elevated today, however, she has not taken her antihypertensive medications. She will continue on Metoprolol  mg daily and Amlodipine 5 mg daily, in addition to her diuretic regimen.\par 4. Her most recent LDL was good, thus she will continue on Lipitor 40 mg daily along with a low cholesterol diet. \par 5. She will follow up with me in 3 months or sooner if she is symptomatic. Her daughter will notify me should her weight go up at home.   \par 6. She may proceed with left cataract surgery from the cardiac standpoint without any further workup. [EKG obtained to assist in diagnosis and management of assessed problem(s)] : EKG obtained to assist in diagnosis and management of assessed problem(s)

## 2023-01-02 NOTE — CARDIOLOGY SUMMARY
[___] : [unfilled] [de-identified] : 7/6/2022: Sinus Rhythm at 68 bpm with low voltage QRS and an old anteroseptal infarct\par  [de-identified] : 1/11/2022: Endocardium not well visualized; grossly normal left ventricular systolic function.  EF is approximately 65%.

## 2023-01-02 NOTE — HISTORY OF PRESENT ILLNESS
[FreeTextEntry1] : Patient is an 82 year old woman with a history of HTN, hyperlipidemia, type 2 diabetes mellitus, breast cancer status post right partial mastectomy, rectal carcinoma status post resection, anemia, hypokalemia, diastolic heart failure, right lower extremity DVT, CAD s/p 2 vessel CABG 11/9/2020 and renal insufficiency who presents today for follow up of HTN and heart failure. She was hospitalized for 9 days for shortness of breath and weight gain. Her Bumex was increased to 1 mg twice daily and she was continued on Spironolactone 12.5 mg daily.  Her pro-BNP was normal on 9/13. She states that her breathing is improved. She otherwise denies any chest pain, dyspnea at rest, palpitations, headaches, and dizziness.

## 2023-01-02 NOTE — DISCUSSION/SUMMARY
[FreeTextEntry1] : IMPRESSION: Ms. Batista is an 82 year old woman with a history of HTN, hyperlipidemia, type 2 diabetes mellitus, breast cancer status post right partial mastectomy, rectal carcinoma status post resection, diastolic heart failure, anemia, hypokalemia, CAD s/p 2-vessel CABG 11/9/2020, and renal insufficiency who presents today for follow up of HTN and heart failure following her recent hospitalization.\par \par PLAN:\par 1. She is s/p CABG 11/2020 and has not experienced any chest pain since her surgery.  She will continue on Aspirin 81 mg daily. Her ECG that was performed today revealed sinus rhythm without any ectopy and was relatively unchanged from previous.  Given that her blood pressure is on the lower side today, I have asked her to change the dose of her Bumex to alternating dosages of 2 mg and 1 mg. Her daughter will continue to follow her weights.  We should aim for a weight of at least around 190 pounds.  She will also continue on Spironolactone 12.5 mg daily as long as her potassium and creatinine are stable. She will also continue on a low sodium diet and restrict her fluid intake. \par 2. She will continue on Eliquis 2.5 mg twice daily for her DVT and she will continue to follow up with Vascular Cardiology. \par 3.  She will continue on Metoprolol  mg daily and Amlodipine 5 mg daily, in addition to her diuretic regimen.\par 4. Her most recent LDL was good, thus she will continue on Lipitor 40 mg daily along with a low cholesterol diet. \par 5. She will follow up with me in 1 month or sooner if she is symptomatic. Her daughter will notify me should her weight trend up. [EKG obtained to assist in diagnosis and management of assessed problem(s)] : EKG obtained to assist in diagnosis and management of assessed problem(s)

## 2023-01-06 NOTE — PHYSICAL EXAM
[General Appearance - Well Developed] : well developed [Normal Appearance] : normal appearance [Well Groomed] : well groomed [General Appearance - Well Nourished] : well nourished [No Deformities] : no deformities [General Appearance - In No Acute Distress] : no acute distress [Normal Conjunctiva] : the conjunctiva exhibited no abnormalities [Exaggerated Use Of Accessory Muscles For Inspiration] : no accessory muscle use [Respiration, Rhythm And Depth] : normal respiratory rhythm and effort [Auscultation Breath Sounds / Voice Sounds] : lungs were clear to auscultation bilaterally [Normal Rate] : normal [Rhythm Regular] : regular [Normal S1] : normal S1 [Normal S2] : normal S2 [II] : a grade 2 [___ +] : bilateral [unfilled]U+ pretibial pitting edema [Bowel Sounds] : normal bowel sounds [Abdomen Soft] : soft [Abdomen Tenderness] : non-tender [Nail Clubbing] : no clubbing of the fingernails [Cyanosis, Localized] : no localized cyanosis [Petechial Hemorrhages (___cm)] : no petechial hemorrhages [Skin Color & Pigmentation] : normal skin color and pigmentation [] : no rash [No Skin Ulcers] : no skin ulcer [Oriented To Time, Place, And Person] : oriented to person, place, and time [Affect] : the affect was normal [Mood] : the mood was normal [No Anxiety] : not feeling anxious [Right Carotid Bruit] : no bruit heard over the right carotid [Left Carotid Bruit] : no bruit heard over the left carotid [Bruit] : no bruit heard [FreeTextEntry1] : Her gait is slow secondary to joint pain.

## 2023-01-06 NOTE — HISTORY OF PRESENT ILLNESS
[FreeTextEntry1] : Patient is an 82 year old woman with a history of HTN, hyperlipidemia, type 2 diabetes mellitus, breast cancer status post right partial mastectomy, rectal carcinoma status post resection, anemia, hypokalemia, diastolic heart failure, right lower extremity DVT, CAD s/p 2 vessel CABG 11/9/2020 and renal insufficiency who presents today for follow up of HTN and heart failure. She states that she experiences dyspnea with exertion. She mentions getting bloated after eating and feels that she cannot breathe. She has not noticed any significant lower extremity edema. She otherwise denies any chest pain, palpitations, headaches, or dizziness. Her right upper extremity swelling due to chronic lymphedema is unchanged.

## 2023-01-06 NOTE — CARDIOLOGY SUMMARY
[___] : [unfilled] [de-identified] : 10/19/2022: Sinus Rhythm at 61 bpm with low voltage QRS, and an old anteroseptal infarct\par  [de-identified] : 9/9/2022: EF ([place] Rule): 62 %. Mild mitral regurgitation. Mildly dilated left atrium. Left ventricle suboptimally visualized despite\par intravenous ultrasonic enhancing agent; the apex is foreshortened on apical images. Overall normal left ventricular systolic function. Grossly normal right ventricular size and systolic function. Mild tricuspid regurgitation. PASP= 35 mmHg, consistent with borderline pulmonary hypertension.

## 2023-01-06 NOTE — DISCUSSION/SUMMARY
[EKG obtained to assist in diagnosis and management of assessed problem(s)] : EKG obtained to assist in diagnosis and management of assessed problem(s) [FreeTextEntry1] : IMPRESSION: Ms. Batista is an 82 year old woman with a history of HTN, hyperlipidemia, type 2 diabetes mellitus, breast cancer status post right partial mastectomy, rectal carcinoma status post resection, diastolic heart failure, anemia, hypokalemia, CAD s/p 2-vessel CABG 11/9/2020, and renal insufficiency who presents today for follow up of HTN and heart failure.\par \par PLAN:\par 1. She is s/p CABG 11/2020 and has not experienced any chest pain since her surgery.  She will continue on Aspirin 81 mg daily. Her ECG that was performed today revealed sinus rhythm without any ectopy and was relatively unchanged from previous.  Her daughter states that her weight have been relatively unchanged at home. She will continue on Bumex 1 mg twice daily every other day and once daily the other days.Her daughter will continue to follow her weights at home.  We should aim for a weight of at least around 190 pounds.  She will also continue on Spironolactone 12.5 mg daily as long as her potassium and creatinine are stable. I will be checking a CMP today to evaluate her potassium and creatinine on her diuretic regimen. I will also be checking a pro-BNP. She will also continue on a low sodium diet and restrict her fluid intake. \par 2. She will continue on Eliquis 2.5 mg twice daily for her DVT and she will continue to follow up with Vascular Cardiology. \par 3. Her blood pressure was good when it was repeated at the time of the physical examination. She will continue on Metoprolol  mg daily and Amlodipine 5 mg daily, in addition to her diuretic regimen.\par 4. Her most recent LDL was good, thus she will continue on Lipitor 40 mg daily along with a low cholesterol diet. I will be checking a lipid profile today.\par 5. She will follow up with me in 3 months or sooner if she is symptomatic. Her daughter will notify me should her weight go up.

## 2023-01-18 ENCOUNTER — INPATIENT (INPATIENT)
Facility: HOSPITAL | Age: 83
LOS: 19 days | Discharge: NURSING, REHAB OR LONG-TERM CARE | DRG: 444 | End: 2023-02-07
Attending: INTERNAL MEDICINE | Admitting: STUDENT IN AN ORGANIZED HEALTH CARE EDUCATION/TRAINING PROGRAM
Payer: MEDICARE

## 2023-01-18 VITALS
OXYGEN SATURATION: 95 % | HEART RATE: 78 BPM | DIASTOLIC BLOOD PRESSURE: 74 MMHG | RESPIRATION RATE: 20 BRPM | SYSTOLIC BLOOD PRESSURE: 152 MMHG

## 2023-01-18 DIAGNOSIS — Z98.89 OTHER SPECIFIED POSTPROCEDURAL STATES: Chronic | ICD-10-CM

## 2023-01-18 DIAGNOSIS — K81.0 ACUTE CHOLECYSTITIS: ICD-10-CM

## 2023-01-18 DIAGNOSIS — Z29.9 ENCOUNTER FOR PROPHYLACTIC MEASURES, UNSPECIFIED: ICD-10-CM

## 2023-01-18 DIAGNOSIS — E11.10 TYPE 2 DIABETES MELLITUS WITH KETOACIDOSIS WITHOUT COMA: ICD-10-CM

## 2023-01-18 DIAGNOSIS — Z90.49 ACQUIRED ABSENCE OF OTHER SPECIFIED PARTS OF DIGESTIVE TRACT: Chronic | ICD-10-CM

## 2023-01-18 DIAGNOSIS — K81.9 CHOLECYSTITIS, UNSPECIFIED: ICD-10-CM

## 2023-01-18 DIAGNOSIS — Z96.651 PRESENCE OF RIGHT ARTIFICIAL KNEE JOINT: Chronic | ICD-10-CM

## 2023-01-18 DIAGNOSIS — I50.30 UNSPECIFIED DIASTOLIC (CONGESTIVE) HEART FAILURE: ICD-10-CM

## 2023-01-18 DIAGNOSIS — Z93.2 ILEOSTOMY STATUS: Chronic | ICD-10-CM

## 2023-01-18 DIAGNOSIS — Z90.11 ACQUIRED ABSENCE OF RIGHT BREAST AND NIPPLE: Chronic | ICD-10-CM

## 2023-01-18 DIAGNOSIS — E78.5 HYPERLIPIDEMIA, UNSPECIFIED: ICD-10-CM

## 2023-01-18 LAB
ALBUMIN SERPL ELPH-MCNC: 4.2 G/DL — SIGNIFICANT CHANGE UP (ref 3.3–5)
ALP SERPL-CCNC: 99 U/L — SIGNIFICANT CHANGE UP (ref 40–120)
ALT FLD-CCNC: 16 U/L — SIGNIFICANT CHANGE UP (ref 10–45)
ANION GAP SERPL CALC-SCNC: 14 MMOL/L — SIGNIFICANT CHANGE UP (ref 5–17)
ANION GAP SERPL CALC-SCNC: 19 MMOL/L — HIGH (ref 5–17)
APPEARANCE UR: CLEAR — SIGNIFICANT CHANGE UP
AST SERPL-CCNC: 15 U/L — SIGNIFICANT CHANGE UP (ref 10–40)
B-OH-BUTYR SERPL-SCNC: 0.2 MMOL/L — SIGNIFICANT CHANGE UP
B-OH-BUTYR SERPL-SCNC: 2 MMOL/L — HIGH
BACTERIA # UR AUTO: NEGATIVE — SIGNIFICANT CHANGE UP
BASE EXCESS BLDV CALC-SCNC: 1.5 MMOL/L — SIGNIFICANT CHANGE UP (ref -2–3)
BASE EXCESS BLDV CALC-SCNC: 2.5 MMOL/L — SIGNIFICANT CHANGE UP (ref -2–3)
BASOPHILS # BLD AUTO: 0.02 K/UL — SIGNIFICANT CHANGE UP (ref 0–0.2)
BASOPHILS NFR BLD AUTO: 0.1 % — SIGNIFICANT CHANGE UP (ref 0–2)
BILIRUB SERPL-MCNC: 0.5 MG/DL — SIGNIFICANT CHANGE UP (ref 0.2–1.2)
BILIRUB UR-MCNC: NEGATIVE — SIGNIFICANT CHANGE UP
BLD GP AB SCN SERPL QL: NEGATIVE — SIGNIFICANT CHANGE UP
BUN SERPL-MCNC: 31 MG/DL — HIGH (ref 7–23)
BUN SERPL-MCNC: 34 MG/DL — HIGH (ref 7–23)
CA-I SERPL-SCNC: 1.19 MMOL/L — SIGNIFICANT CHANGE UP (ref 1.15–1.33)
CA-I SERPL-SCNC: 1.2 MMOL/L — SIGNIFICANT CHANGE UP (ref 1.15–1.33)
CALCIUM SERPL-MCNC: 10.1 MG/DL — SIGNIFICANT CHANGE UP (ref 8.4–10.5)
CALCIUM SERPL-MCNC: 9.4 MG/DL — SIGNIFICANT CHANGE UP (ref 8.4–10.5)
CHLORIDE BLDV-SCNC: 95 MMOL/L — LOW (ref 96–108)
CHLORIDE BLDV-SCNC: 97 MMOL/L — SIGNIFICANT CHANGE UP (ref 96–108)
CHLORIDE SERPL-SCNC: 94 MMOL/L — LOW (ref 96–108)
CHLORIDE SERPL-SCNC: 98 MMOL/L — SIGNIFICANT CHANGE UP (ref 96–108)
CO2 BLDV-SCNC: 29 MMOL/L — HIGH (ref 22–26)
CO2 BLDV-SCNC: 30 MMOL/L — HIGH (ref 22–26)
CO2 SERPL-SCNC: 22 MMOL/L — SIGNIFICANT CHANGE UP (ref 22–31)
CO2 SERPL-SCNC: 24 MMOL/L — SIGNIFICANT CHANGE UP (ref 22–31)
COLOR SPEC: SIGNIFICANT CHANGE UP
CREAT SERPL-MCNC: 1.58 MG/DL — HIGH (ref 0.5–1.3)
CREAT SERPL-MCNC: 1.77 MG/DL — HIGH (ref 0.5–1.3)
DIFF PNL FLD: ABNORMAL
EGFR: 28 ML/MIN/1.73M2 — LOW
EGFR: 32 ML/MIN/1.73M2 — LOW
EOSINOPHIL # BLD AUTO: 0 K/UL — SIGNIFICANT CHANGE UP (ref 0–0.5)
EOSINOPHIL NFR BLD AUTO: 0 % — SIGNIFICANT CHANGE UP (ref 0–6)
EPI CELLS # UR: 2 /HPF — SIGNIFICANT CHANGE UP
FLUAV AG NPH QL: SIGNIFICANT CHANGE UP
FLUBV AG NPH QL: SIGNIFICANT CHANGE UP
GAS PNL BLDV: 133 MMOL/L — LOW (ref 136–145)
GAS PNL BLDV: 134 MMOL/L — LOW (ref 136–145)
GAS PNL BLDV: SIGNIFICANT CHANGE UP
GLUCOSE BLDV-MCNC: 336 MG/DL — HIGH (ref 70–99)
GLUCOSE BLDV-MCNC: 479 MG/DL — CRITICAL HIGH (ref 70–99)
GLUCOSE SERPL-MCNC: 323 MG/DL — HIGH (ref 70–99)
GLUCOSE SERPL-MCNC: 458 MG/DL — CRITICAL HIGH (ref 70–99)
GLUCOSE UR QL: ABNORMAL
HCO3 BLDV-SCNC: 27 MMOL/L — SIGNIFICANT CHANGE UP (ref 22–29)
HCO3 BLDV-SCNC: 28 MMOL/L — SIGNIFICANT CHANGE UP (ref 22–29)
HCT VFR BLD CALC: 42.6 % — SIGNIFICANT CHANGE UP (ref 34.5–45)
HCT VFR BLDA CALC: 40 % — SIGNIFICANT CHANGE UP (ref 34.5–46.5)
HCT VFR BLDA CALC: 42 % — SIGNIFICANT CHANGE UP (ref 34.5–46.5)
HGB BLD CALC-MCNC: 13.4 G/DL — SIGNIFICANT CHANGE UP (ref 11.7–16.1)
HGB BLD CALC-MCNC: 14 G/DL — SIGNIFICANT CHANGE UP (ref 11.7–16.1)
HGB BLD-MCNC: 13.6 G/DL — SIGNIFICANT CHANGE UP (ref 11.5–15.5)
IMM GRANULOCYTES NFR BLD AUTO: 0.4 % — SIGNIFICANT CHANGE UP (ref 0–0.9)
KETONES UR-MCNC: ABNORMAL
LACTATE BLDV-MCNC: 2.6 MMOL/L — HIGH (ref 0.5–2)
LACTATE BLDV-MCNC: 2.9 MMOL/L — HIGH (ref 0.5–2)
LEUKOCYTE ESTERASE UR-ACNC: NEGATIVE — SIGNIFICANT CHANGE UP
LIDOCAIN IGE QN: 20 U/L — SIGNIFICANT CHANGE UP (ref 7–60)
LYMPHOCYTES # BLD AUTO: 0.39 K/UL — LOW (ref 1–3.3)
LYMPHOCYTES # BLD AUTO: 2.3 % — LOW (ref 13–44)
MAGNESIUM SERPL-MCNC: 1.9 MG/DL — SIGNIFICANT CHANGE UP (ref 1.6–2.6)
MCHC RBC-ENTMCNC: 29.9 PG — SIGNIFICANT CHANGE UP (ref 27–34)
MCHC RBC-ENTMCNC: 31.9 GM/DL — LOW (ref 32–36)
MCV RBC AUTO: 93.6 FL — SIGNIFICANT CHANGE UP (ref 80–100)
MONOCYTES # BLD AUTO: 0.59 K/UL — SIGNIFICANT CHANGE UP (ref 0–0.9)
MONOCYTES NFR BLD AUTO: 3.5 % — SIGNIFICANT CHANGE UP (ref 2–14)
NEUTROPHILS # BLD AUTO: 15.6 K/UL — HIGH (ref 1.8–7.4)
NEUTROPHILS NFR BLD AUTO: 93.7 % — HIGH (ref 43–77)
NITRITE UR-MCNC: NEGATIVE — SIGNIFICANT CHANGE UP
NRBC # BLD: 0 /100 WBCS — SIGNIFICANT CHANGE UP (ref 0–0)
NT-PROBNP SERPL-SCNC: 602 PG/ML — HIGH (ref 0–300)
PCO2 BLDV: 46 MMHG — HIGH (ref 39–42)
PCO2 BLDV: 48 MMHG — HIGH (ref 39–42)
PH BLDV: 7.38 — SIGNIFICANT CHANGE UP (ref 7.32–7.43)
PH BLDV: 7.38 — SIGNIFICANT CHANGE UP (ref 7.32–7.43)
PH UR: 7.5 — SIGNIFICANT CHANGE UP (ref 5–8)
PLATELET # BLD AUTO: 295 K/UL — SIGNIFICANT CHANGE UP (ref 150–400)
PO2 BLDV: 49 MMHG — HIGH (ref 25–45)
PO2 BLDV: 52 MMHG — HIGH (ref 25–45)
POTASSIUM BLDV-SCNC: 4.2 MMOL/L — SIGNIFICANT CHANGE UP (ref 3.5–5.1)
POTASSIUM BLDV-SCNC: 4.4 MMOL/L — SIGNIFICANT CHANGE UP (ref 3.5–5.1)
POTASSIUM SERPL-MCNC: 4.2 MMOL/L — SIGNIFICANT CHANGE UP (ref 3.5–5.3)
POTASSIUM SERPL-MCNC: 4.4 MMOL/L — SIGNIFICANT CHANGE UP (ref 3.5–5.3)
POTASSIUM SERPL-SCNC: 4.2 MMOL/L — SIGNIFICANT CHANGE UP (ref 3.5–5.3)
POTASSIUM SERPL-SCNC: 4.4 MMOL/L — SIGNIFICANT CHANGE UP (ref 3.5–5.3)
PROT SERPL-MCNC: 7.6 G/DL — SIGNIFICANT CHANGE UP (ref 6–8.3)
PROT UR-MCNC: ABNORMAL
RBC # BLD: 4.55 M/UL — SIGNIFICANT CHANGE UP (ref 3.8–5.2)
RBC # FLD: 14.8 % — HIGH (ref 10.3–14.5)
RBC CASTS # UR COMP ASSIST: 6 /HPF — HIGH (ref 0–4)
RH IG SCN BLD-IMP: POSITIVE — SIGNIFICANT CHANGE UP
RSV RNA NPH QL NAA+NON-PROBE: SIGNIFICANT CHANGE UP
SAO2 % BLDV: 74.8 % — SIGNIFICANT CHANGE UP (ref 67–88)
SAO2 % BLDV: 79 % — SIGNIFICANT CHANGE UP (ref 67–88)
SARS-COV-2 RNA SPEC QL NAA+PROBE: SIGNIFICANT CHANGE UP
SODIUM SERPL-SCNC: 135 MMOL/L — SIGNIFICANT CHANGE UP (ref 135–145)
SODIUM SERPL-SCNC: 136 MMOL/L — SIGNIFICANT CHANGE UP (ref 135–145)
SP GR SPEC: 1.02 — SIGNIFICANT CHANGE UP (ref 1.01–1.02)
TROPONIN T, HIGH SENSITIVITY RESULT: 18 NG/L — SIGNIFICANT CHANGE UP (ref 0–51)
UROBILINOGEN FLD QL: NEGATIVE — SIGNIFICANT CHANGE UP
WBC # BLD: 16.66 K/UL — HIGH (ref 3.8–10.5)
WBC # FLD AUTO: 16.66 K/UL — HIGH (ref 3.8–10.5)
WBC UR QL: 2 /HPF — SIGNIFICANT CHANGE UP (ref 0–5)

## 2023-01-18 PROCEDURE — 76705 ECHO EXAM OF ABDOMEN: CPT | Mod: 26

## 2023-01-18 PROCEDURE — 36556 INSERT NON-TUNNEL CV CATH: CPT

## 2023-01-18 PROCEDURE — 71045 X-RAY EXAM CHEST 1 VIEW: CPT | Mod: 26

## 2023-01-18 PROCEDURE — 99223 1ST HOSP IP/OBS HIGH 75: CPT | Mod: GC

## 2023-01-18 PROCEDURE — 99285 EMERGENCY DEPT VISIT HI MDM: CPT | Mod: FS,CS,25

## 2023-01-18 PROCEDURE — 99233 SBSQ HOSP IP/OBS HIGH 50: CPT | Mod: GC

## 2023-01-18 PROCEDURE — 74176 CT ABD & PELVIS W/O CONTRAST: CPT | Mod: 26,MA

## 2023-01-18 RX ORDER — SODIUM CHLORIDE 9 MG/ML
250 INJECTION INTRAMUSCULAR; INTRAVENOUS; SUBCUTANEOUS ONCE
Refills: 0 | Status: COMPLETED | OUTPATIENT
Start: 2023-01-18 | End: 2023-01-18

## 2023-01-18 RX ORDER — INSULIN HUMAN 100 [IU]/ML
4 INJECTION, SOLUTION SUBCUTANEOUS ONCE
Refills: 0 | Status: COMPLETED | OUTPATIENT
Start: 2023-01-18 | End: 2023-01-18

## 2023-01-18 RX ORDER — ASPIRIN/CALCIUM CARB/MAGNESIUM 324 MG
81 TABLET ORAL DAILY
Refills: 0 | Status: DISCONTINUED | OUTPATIENT
Start: 2023-01-18 | End: 2023-01-22

## 2023-01-18 RX ORDER — FAMOTIDINE 10 MG/ML
20 INJECTION INTRAVENOUS ONCE
Refills: 0 | Status: COMPLETED | OUTPATIENT
Start: 2023-01-18 | End: 2023-01-18

## 2023-01-18 RX ORDER — ONDANSETRON 8 MG/1
4 TABLET, FILM COATED ORAL ONCE
Refills: 0 | Status: DISCONTINUED | OUTPATIENT
Start: 2023-01-18 | End: 2023-01-18

## 2023-01-18 RX ORDER — PIPERACILLIN AND TAZOBACTAM 4; .5 G/20ML; G/20ML
3.38 INJECTION, POWDER, LYOPHILIZED, FOR SOLUTION INTRAVENOUS ONCE
Refills: 0 | Status: COMPLETED | OUTPATIENT
Start: 2023-01-18 | End: 2023-01-18

## 2023-01-18 RX ORDER — INSULIN LISPRO 100/ML
VIAL (ML) SUBCUTANEOUS EVERY 6 HOURS
Refills: 0 | Status: DISCONTINUED | OUTPATIENT
Start: 2023-01-18 | End: 2023-01-19

## 2023-01-18 RX ORDER — SODIUM CHLORIDE 9 MG/ML
10 INJECTION INTRAMUSCULAR; INTRAVENOUS; SUBCUTANEOUS
Refills: 0 | Status: DISCONTINUED | OUTPATIENT
Start: 2023-01-18 | End: 2023-02-07

## 2023-01-18 RX ORDER — GLUCAGON INJECTION, SOLUTION 0.5 MG/.1ML
1 INJECTION, SOLUTION SUBCUTANEOUS ONCE
Refills: 0 | Status: DISCONTINUED | OUTPATIENT
Start: 2023-01-18 | End: 2023-02-07

## 2023-01-18 RX ORDER — SODIUM CHLORIDE 9 MG/ML
1000 INJECTION INTRAMUSCULAR; INTRAVENOUS; SUBCUTANEOUS
Refills: 0 | Status: DISCONTINUED | OUTPATIENT
Start: 2023-01-18 | End: 2023-01-21

## 2023-01-18 RX ORDER — SODIUM CHLORIDE 9 MG/ML
500 INJECTION INTRAMUSCULAR; INTRAVENOUS; SUBCUTANEOUS ONCE
Refills: 0 | Status: DISCONTINUED | OUTPATIENT
Start: 2023-01-18 | End: 2023-01-18

## 2023-01-18 RX ORDER — MORPHINE SULFATE 50 MG/1
4 CAPSULE, EXTENDED RELEASE ORAL ONCE
Refills: 0 | Status: DISCONTINUED | OUTPATIENT
Start: 2023-01-18 | End: 2023-01-18

## 2023-01-18 RX ORDER — SODIUM CHLORIDE 9 MG/ML
1000 INJECTION, SOLUTION INTRAVENOUS
Refills: 0 | Status: DISCONTINUED | OUTPATIENT
Start: 2023-01-18 | End: 2023-02-07

## 2023-01-18 RX ORDER — MORPHINE SULFATE 50 MG/1
1 CAPSULE, EXTENDED RELEASE ORAL EVERY 6 HOURS
Refills: 0 | Status: DISCONTINUED | OUTPATIENT
Start: 2023-01-18 | End: 2023-01-24

## 2023-01-18 RX ORDER — AMLODIPINE BESYLATE 2.5 MG/1
5 TABLET ORAL DAILY
Refills: 0 | Status: DISCONTINUED | OUTPATIENT
Start: 2023-01-18 | End: 2023-01-20

## 2023-01-18 RX ORDER — SITAGLIPTIN 50 MG/1
1 TABLET, FILM COATED ORAL
Qty: 0 | Refills: 0 | DISCHARGE

## 2023-01-18 RX ORDER — ACETAMINOPHEN 500 MG
1000 TABLET ORAL ONCE
Refills: 0 | Status: COMPLETED | OUTPATIENT
Start: 2023-01-18 | End: 2023-01-18

## 2023-01-18 RX ORDER — CHLORHEXIDINE GLUCONATE 213 G/1000ML
1 SOLUTION TOPICAL
Refills: 0 | Status: DISCONTINUED | OUTPATIENT
Start: 2023-01-18 | End: 2023-02-07

## 2023-01-18 RX ORDER — ACETAMINOPHEN 500 MG
650 TABLET ORAL EVERY 6 HOURS
Refills: 0 | Status: DISCONTINUED | OUTPATIENT
Start: 2023-01-18 | End: 2023-01-22

## 2023-01-18 RX ORDER — GABAPENTIN 400 MG/1
200 CAPSULE ORAL THREE TIMES A DAY
Refills: 0 | Status: DISCONTINUED | OUTPATIENT
Start: 2023-01-18 | End: 2023-01-30

## 2023-01-18 RX ORDER — DEXTROSE 50 % IN WATER 50 %
25 SYRINGE (ML) INTRAVENOUS ONCE
Refills: 0 | Status: DISCONTINUED | OUTPATIENT
Start: 2023-01-18 | End: 2023-02-07

## 2023-01-18 RX ORDER — PIPERACILLIN AND TAZOBACTAM 4; .5 G/20ML; G/20ML
3.38 INJECTION, POWDER, LYOPHILIZED, FOR SOLUTION INTRAVENOUS EVERY 8 HOURS
Refills: 0 | Status: DISCONTINUED | OUTPATIENT
Start: 2023-01-18 | End: 2023-01-24

## 2023-01-18 RX ORDER — PREGABALIN 225 MG/1
1 CAPSULE ORAL
Qty: 0 | Refills: 0 | DISCHARGE

## 2023-01-18 RX ORDER — METOPROLOL TARTRATE 50 MG
150 TABLET ORAL DAILY
Refills: 0 | Status: DISCONTINUED | OUTPATIENT
Start: 2023-01-18 | End: 2023-01-26

## 2023-01-18 RX ORDER — ATORVASTATIN CALCIUM 80 MG/1
40 TABLET, FILM COATED ORAL AT BEDTIME
Refills: 0 | Status: DISCONTINUED | OUTPATIENT
Start: 2023-01-18 | End: 2023-02-07

## 2023-01-18 RX ORDER — SPIRONOLACTONE 25 MG/1
12.5 TABLET, FILM COATED ORAL DAILY
Refills: 0 | Status: DISCONTINUED | OUTPATIENT
Start: 2023-01-18 | End: 2023-01-21

## 2023-01-18 RX ORDER — DEXTROSE 50 % IN WATER 50 %
15 SYRINGE (ML) INTRAVENOUS ONCE
Refills: 0 | Status: DISCONTINUED | OUTPATIENT
Start: 2023-01-18 | End: 2023-02-07

## 2023-01-18 RX ORDER — INSULIN GLARGINE 100 [IU]/ML
10 INJECTION, SOLUTION SUBCUTANEOUS ONCE
Refills: 0 | Status: COMPLETED | OUTPATIENT
Start: 2023-01-18 | End: 2023-01-18

## 2023-01-18 RX ORDER — CHOLECALCIFEROL (VITAMIN D3) 125 MCG
1 CAPSULE ORAL
Qty: 0 | Refills: 0 | DISCHARGE

## 2023-01-18 RX ORDER — DEXTROSE 50 % IN WATER 50 %
12.5 SYRINGE (ML) INTRAVENOUS ONCE
Refills: 0 | Status: DISCONTINUED | OUTPATIENT
Start: 2023-01-18 | End: 2023-02-07

## 2023-01-18 RX ADMIN — INSULIN GLARGINE 10 UNIT(S): 100 INJECTION, SOLUTION SUBCUTANEOUS at 18:48

## 2023-01-18 RX ADMIN — INSULIN HUMAN 4 UNIT(S): 100 INJECTION, SOLUTION SUBCUTANEOUS at 13:54

## 2023-01-18 RX ADMIN — FAMOTIDINE 20 MILLIGRAM(S): 10 INJECTION INTRAVENOUS at 11:03

## 2023-01-18 RX ADMIN — SODIUM CHLORIDE 250 MILLILITER(S): 9 INJECTION INTRAMUSCULAR; INTRAVENOUS; SUBCUTANEOUS at 11:04

## 2023-01-18 RX ADMIN — Medication 30 MILLILITER(S): at 11:03

## 2023-01-18 RX ADMIN — MORPHINE SULFATE 4 MILLIGRAM(S): 50 CAPSULE, EXTENDED RELEASE ORAL at 11:04

## 2023-01-18 RX ADMIN — SODIUM CHLORIDE 70 MILLILITER(S): 9 INJECTION INTRAMUSCULAR; INTRAVENOUS; SUBCUTANEOUS at 22:32

## 2023-01-18 RX ADMIN — GABAPENTIN 200 MILLIGRAM(S): 400 CAPSULE ORAL at 22:32

## 2023-01-18 RX ADMIN — PIPERACILLIN AND TAZOBACTAM 25 GRAM(S): 4; .5 INJECTION, POWDER, LYOPHILIZED, FOR SOLUTION INTRAVENOUS at 19:26

## 2023-01-18 RX ADMIN — Medication 2: at 19:48

## 2023-01-18 RX ADMIN — Medication 400 MILLIGRAM(S): at 11:04

## 2023-01-18 RX ADMIN — ATORVASTATIN CALCIUM 40 MILLIGRAM(S): 80 TABLET, FILM COATED ORAL at 22:32

## 2023-01-18 RX ADMIN — Medication 0.5 MILLIGRAM(S): at 15:48

## 2023-01-18 RX ADMIN — PIPERACILLIN AND TAZOBACTAM 200 GRAM(S): 4; .5 INJECTION, POWDER, LYOPHILIZED, FOR SOLUTION INTRAVENOUS at 11:04

## 2023-01-18 NOTE — ED PROVIDER NOTE - CLINICAL SUMMARY MEDICAL DECISION MAKING FREE TEXT BOX
82yof w/ hx of HTN, hyperlipidemia, type 2 diabetes mellitus, breast cancer status post right partial mastectomy, rectal carcinoma status post resection, anemia, diastolic heart failure, hx of right lower extremity DVT, CAD s/p 2 vessel CABG 11/9/2020 and renal insufficiency, recently admitted 8/2022 for volume overload now bibems for abd pain and vomiting (NBNB, not black) for the last ~3-4 days, intermittent, now worsening and near-constant since last night, a/w poor PO for the last few days after taking clindamycin for dental infection (several doses, stopped approx 3 days ago due to stomach pain).  Abd pain radiates to back, pt appears uncomfortable, has epigastric and RUQ tenderness to palpation, equivocal murphys, no sob, urinary symptoms, f/c, abd distension.  No radiation of symptoms to extremities but patient makes large [place] about her epigastrium including her lower chest when asked to describe location of pain.  Broad ddx including pancreatitis, gastritis, cholecystitis/biliary colic, PNA, kidney stone, acute coronary syndrome, PTX (less likely); much less likely aortic catastrophe given nature of pain.  Doubt cdiff given lack of diarrhea.  Will need pain Rx, very gentle fluids (pt appears hypovolemic but has significant h/o HFpEF), CT AP, US RUQ.  EKG shows qtc 519 so will hold zofran, give GI cocktail, reassess.  Probable admission.  --BMM 82yof w/ hx of HTN, hyperlipidemia, type 2 diabetes mellitus, breast cancer status post right partial mastectomy, rectal carcinoma status post resection, anemia, diastolic heart failure, hx of right lower extremity DVT, CAD s/p 2 vessel CABG 11/9/2020 and renal insufficiency, recently admitted 8/2022 for volume overload now bibems for abd pain and vomiting (NBNB, not black) for the last ~3-4 days, intermittent, now worsening and near-constant since last night, a/w poor PO for the last few days after taking clindamycin for dental infection (several doses, stopped approx 3 days ago due to stomach pain).  Abd pain radiates to back, pt appears uncomfortable, has epigastric and RUQ tenderness to palpation, equivocal murphys, no sob, urinary symptoms, f/c, abd distension.  No radiation of symptoms to extremities but patient makes large [place] about her epigastrium including her lower chest when asked to describe location of pain.  Broad ddx including pancreatitis, gastritis, cholecystitis/biliary colic, DKA, pyelonephritis, PNA, kidney stone, acute coronary syndrome, PTX (less likely); much less likely aortic catastrophe given nature of pain.  Doubt cdiff given lack of diarrhea.  Will need pain Rx, very gentle fluids (pt appears hypovolemic but has significant h/o HFpEF), CT AP, US RUQ.  EKG shows qtc 519 so will hold zofran, give GI cocktail, reassess.  Probable admission.  --BMM

## 2023-01-18 NOTE — H&P ADULT - NSHPLABSRESULTS_GEN_ALL_CORE
LABS:                          13.6   16.66 )-----------( 295      ( 18 Jan 2023 10:23 )             42.6     01-18    135  |  94<L>  |  34<H>  ----------------------------<  458<HH>  4.4   |  22  |  1.77<H>    Ca    10.1      18 Jan 2023 10:23  Mg     1.9     01-18    TPro  7.6  /  Alb  4.2  /  TBili  0.5  /  DBili  x   /  AST  15  /  ALT  16  /  AlkPhos  99  01-18      < from: CT Abdomen and Pelvis No Cont (01.18.23 @ 11:02) >    IMPRESSION:  1.  Acute cholecystitis.  2.  Choledocholithiasis.    < end of copied text > LABS:                          13.6   16.66 )-----------( 295      ( 18 Jan 2023 10:23 )             42.6     01-18    135  |  94<L>  |  34<H>  ----------------------------<  458<HH>  4.4   |  22  |  1.77<H>    Ca    10.1      18 Jan 2023 10:23  Mg     1.9     01-18    TPro  7.6  /  Alb  4.2  /  TBili  0.5  /  DBili  x   /  AST  15  /  ALT  16  /  AlkPhos  99  01-18        EKG: Q waves in V1-V4, QTc 518    < from: CT Abdomen and Pelvis No Cont (01.18.23 @ 11:02) >    IMPRESSION:  1.  Acute cholecystitis.  2.  Choledocholithiasis.    < end of copied text >

## 2023-01-18 NOTE — H&P ADULT - PROBLEM SELECTOR PLAN 3
Patient with HFpEF, follows with Dr. Hagan   - recent admission 8/2022 for ADHF and fluid overload  -  upon dc, 602 upon admission  - appears euvolemic on exam  - TTE: EF 62%, normal LV/RV systolic function, elevated LV filling pressures, mild MR    Plan:  > continue with home meds: spironolactone 12.5 qd, metoprolol 150 qd, bumex 1 mg qd Patient with HFpEF, follows with Dr. Hagan   - recent admission 8/2022 for ADHF and fluid overload  -  upon dc, 602 upon admission  - TTE 8/2022: EF 62%, normal LV/RV systolic function, elevated LV filling pressures, mild MR    Plan:  > continue with home meds: spironolactone 12.5 qd, metoprolol 150 qd  > hold bumex given likely hypovolemic status iso volume loss with vomiting

## 2023-01-18 NOTE — H&P ADULT - PROBLEM SELECTOR PLAN 2
Patient presenting with mild DKA given BHB of 2 and serum glucose of 450  - adequate response to 4U regular insulin  - s/p 750 cc IV hydration, judicious hydration given HFpEF  - patient on oral agents: repaglinide and semaglutide  - upon last admission 8/2022, patient was well controlled on lantus 16U QHS and admelog 7U TID  - A1c 8/2022: 9.6    Plan:  > trend BHB, pH, serum HCO3; improving s/p 4U regular insulin  > continue with lantus 16U qhs, moderate dose correctional sliding scale  > hold admelog while NPO Patient presenting with mild DKA given BHB of 2 and serum glucose of 450  - adequate response to 4U regular insulin  - s/p 750 cc IV hydration, judicious hydration given HFpEF  - patient on oral agents: repaglinide and semaglutide  - upon last admission 8/2022, patient was well controlled on lantus 16U QHS and admelog 7U TID  - A1c 8/2022: 9.6  - resolved, or likely was due to starvation ketosis given AG closed, pH normal, HCO3 normal, glucose improving    Plan:  > trend BHB, pH, serum HCO3; improving s/p 4U regular insulin  > continue with lantus 10U   > moderate dose correctional sliding scale q6h while NPO

## 2023-01-18 NOTE — ED PROVIDER NOTE - PHYSICAL EXAMINATION
Gen: AAO x 3, uncomfortable appearing.   Skin: No rashes or lesions; dry MM   HEENT: NC/AT, PERRLA, EOMI, MMM  Resp: unlabored CTAB  Cardiac: rrr s1s2, no murmurs, rubs or gallops  GI: ND, +BS, Soft, +RUQ and epigastric ttp   Ext: no pedal edema, FROM in all extremities  Neuro: no focal deficits

## 2023-01-18 NOTE — CONSULT NOTE ADULT - SUBJECTIVE AND OBJECTIVE BOX
GENERAL SURGERY CONSULT NOTE  --------------------------------------------------------------------------------------------    Patient is a 82y old Female who presents with a chief complaint of abdominal pain.     HPI: 82 year old woman presents with right sided abdominal pain with associated nausea and vomiting, nonbloody. Also with occasional fever but recently completed antibiotics for dental abscess. Found to have acute cholecystitis based on CT and ultrasound, and with 1.2cm CBD. Also hyperglycemic to 470s, has diabetes.  Extensive PMH of breast cancer s/p right partial mastectomy and axillary surgery c/b lymphedema, colorectal cancer s/p resection and ostomy with reversal, CHF s/p CABG.  Additional history obtained from daughter at bedside.        ROS: 10-system review is otherwise negative except HPI above.      PAST MEDICAL & SURGICAL HISTORY:  Breast CA, right  1989 s/p mastectomy, IV Chemotherapy  HTN (hypertension)  Lymphedema of arm  right arm s/p mastectomy  Hyperlipidemia  Rectal cancer  s/p chemotherapy and  radiation 12 weeks 2/2015 -3/2015  Obese  Type II diabetes mellitus  CHF (congestive heart failure)  DVT of leg (deep venous thrombosis)  right calf DVT  2/2019 pt on Eliquis  Renal insufficiency  S/P mastectomy, right  1989  History of appendectomy  1953  S/P ileostomy  low anterior resection-8/10/15, reversal of ileostomy 2016  S/P colon resection  2015  S/P TKR (total knee replacement), right  2017        FAMILY HISTORY:  Family history of diabetes mellitus in mother    Family history of diabetes mellitus in son    Family history of heart attack (Child)        SOCIAL HISTORY:      ALLERGIES: CT scan dye (Hives)  penicillin (Unknown)      HOME MEDICATIONS:     CURRENT MEDICATIONS  MEDICATIONS (STANDING): insulin regular  human recombinant. 4 Unit(s) SubCutaneous once  LORazepam   Injectable 0.5 milliGRAM(s) IV Push once    MEDICATIONS (PRN):  --------------------------------------------------------------------------------------------    Vitals:   T(C): --  HR: 78 (01-18-23 @ 08:43) (78 - 78)  BP: 152/74 (01-18-23 @ 08:43) (152/74 - 152/74)  RR: 20 (01-18-23 @ 08:43) (20 - 20)  SpO2: 95% (01-18-23 @ 08:43) (95% - 95%)  CAPILLARY BLOOD GLUCOSE    POCT Blood Glucose.: 365 mg/dL (18 Jan 2023 13:30)    PHYSICAL EXAM:   General: NAD, uncomfortable appearing   Neuro: A+Ox3 in [place]   HEENT: NC/AT, EOMI, anicteric sclera   Cardio: RRR on monitor, normotensive nontachy   Resp: Good effort, CTA b/l  GI/Abd: Multiple well healed scars, tender RUQ   Skin: Intact, no breakdown, no jaundice   Musculoskeletal: All 4 extremities moving spontaneously, no limitations, lymphedema RUE  --------------------------------------------------------------------------------------------    LABS  CBC (01-18 @ 10:23)                              13.6                           16.66<H>  )----------------(  295        93.7<H>% Neutrophils, 2.3<L>% Lymphocytes, ANC: 15.60<H>                              42.6      BMP (01-18 @ 10:23)             135     |  94<L>   |  34<H> 		Ca++ --      Ca 10.1               ---------------------------------( 458<HH>		Mg 1.9                4.4     |  22      |  1.77<H>			Ph --        LFTs (01-18 @ 10:23)      TPro 7.6 / Alb 4.2 / TBili 0.5 / DBili -- / AST 15 / ALT 16 / AlkPhos 99          VBG (01-18 @ 10:21)     7.38 / 46<H> / 52<H> / 27 / 1.5 / 79.0%     Lactate: 2.6<H>    --------------------------------------------------------------------------------------------    MICROBIOLOGY      --------------------------------------------------------------------------------------------    IMAGING

## 2023-01-18 NOTE — ED PROVIDER NOTE - CARE PLAN
Principal Discharge DX:	Cholecystitis  Secondary Diagnosis:	Choledocholithiasis  Secondary Diagnosis:	Sepsis   1

## 2023-01-18 NOTE — H&P ADULT - ATTENDING COMMENTS
This is a 82F with h/o HTN, T2DM, HFpEF (EF 65%, normal LV in 2022), breast cancer s/p R partial mastectomy, rectal carcinoma s/p resection, CAD s/p CABG who presents for 1 week of intermittent abdominal pain associated with nausea and vomiting, Denies chest pain, SOB, fever.    In ED her VSS, CT abd/pelvis showed acute cholecystitis with choledocholithiasis, pericholecystic fluid, CBD 1.2cm. Surgery evaluated and recommended perc drain by IR and medical management. WBC 16, normal LFTs, A-Gap 19 with elevated FSBS.    1. Acute cholecystitis with choledocholithiasis   2. Uncontrolled T2DM with hyperglycemia  3. TAO on CKD 3, pre-renal  4. Chronic hydronephrosis  5. HTN  6. h/o CABG    - Afebrile, no sepsis, VSS  - reviewed labs, imaging. Started on IV Zosyn, IV hydration, analgesics, NPO  - appreciated Surgery consult- rec Perc drain by IR, GI consult  - Post IV hydration, A-Gap 14, HCO3 24, FS has been elevated. Will give Lantus 10u now and keep her on SS  - c/w home BP meds   - Chronic hydronephrosis noted on US, Scr baseline at 1.5, s/p IV hydration  - DVT ppx for tonight, Hold DVT ppx for any procedure tomorrow.  - DNR/DNI  ** spoke to daughter at bedside This is a 82F with h/o HTN, T2DM, HFpEF (EF 65%, normal LV in 2022), breast cancer s/p R partial mastectomy, rectal carcinoma s/p resection, CAD s/p CABG who presents for 1 week of intermittent abdominal pain associated with nausea and vomiting, Denies chest pain, SOB, fever.    In ED her VSS, CT abd/pelvis showed acute cholecystitis with choledocholithiasis, pericholecystic fluid, CBD 1.2cm. Surgery evaluated and recommended perc drain by IR and medical management. WBC 16, normal LFTs, A-Gap 19 with elevated FSBS.    1. Acute cholecystitis with choledocholithiasis   2. Uncontrolled T2DM with hyperglycemia  3. TAO on CKD 3, pre-renal  4. Chronic hydronephrosis  5. HTN  6. h/o CABG    - Afebrile, no sepsis, VSS  - reviewed labs- WBC 16, imaging- CT abd/pelvis showed acute cholecystitis with choledocholithiasis, pericholecystic fluid, CBD 1.2cm. US- positive Zafar, old hydro.  - Started on IV Zosyn, IV hydration, analgesics, NPO  - appreciated Surgery consult- rec Perc drain by IR, GI consult  - Post IV hydration, A-Gap 14, HCO3 24, FS has been elevated. Will give Lantus 10u now and keep her on SS  - c/w home BP meds   - Chronic hydronephrosis noted on US, Scr baseline at 1.5, s/p IV hydration  - DVT ppx for tonight, Hold DVT ppx for any procedure tomorrow.  - DNR/DNI  ** spoke to daughter at bedside This is a 82F with h/o HTN, T2DM, HFpEF (EF 65%, normal LV in 2022), breast cancer s/p R partial mastectomy, rectal carcinoma s/p resection, CAD s/p CABG who presents for 1 week of intermittent abdominal pain associated with nausea and vomiting, Denies chest pain, SOB, fever.    In ED her VSS, CT abd/pelvis showed acute cholecystitis with choledocholithiasis, pericholecystic fluid, CBD 1.2cm. Surgery evaluated and recommended perc drain by IR and medical management. WBC 16, normal LFTs, A-Gap 19 with elevated FSBS.    1. Acute cholecystitis with choledocholithiasis   2. Uncontrolled T2DM with hyperglycemia  3. TAO on CKD 3, pre-renal  4. Chronic hydronephrosis  5. HTN  6. h/o CABG    - Afebrile, no sepsis, VSS  - reviewed labs- WBC 16, imaging- CT abd/pelvis showed acute cholecystitis with choledocholithiasis, pericholecystic fluid, CBD 1.2cm. US- positive Zafar, old hydro.  - Started on IV Zosyn, IV hydration, analgesics, NPO  - appreciated Surgery consult- rec Perc drain by IR, GI consult  - Post IV hydration, A-Gap 14, HCO3 24, FS has been elevated. Will give Lantus 10u now and keep her on SS  - c/w home BP meds   - Chronic hydronephrosis noted on US, Scr baseline at 1.5, s/p IV hydration  - Hold Eliquis, DVT ppx for tonight, Hold DVT ppx for any procedure tomorrow.  - medically stable, no chest pain, SOB, Echo- EF 65%, normal LV function in 2022. No contraindication for GI or IR procedures  ** spoke to daughter at bedside

## 2023-01-18 NOTE — ED PROCEDURE NOTE - ATTENDING CONTRIBUTION TO CARE
Attending note -- agree with the above as documented unless otherwise noted.  I was present during the key portions of this procedure.  --DANG

## 2023-01-18 NOTE — PATIENT PROFILE ADULT - FALL HARM RISK - HARM RISK INTERVENTIONS

## 2023-01-18 NOTE — H&P ADULT - NSHPREVIEWOFSYSTEMS_GEN_ALL_CORE
REVIEW OF SYSTEMS:    CONSTITUTIONAL: No weakness, fevers or chills  EYES/ENT: No visual changes;  No vertigo or throat pain   NECK: No pain or stiffness  RESPIRATORY: No cough, wheezing, hemoptysis; No shortness of breath  CARDIOVASCULAR: No chest pain or palpitations  GASTROINTESTINAL: No abdominal or epigastric pain. No nausea, vomiting, or hematemesis; No diarrhea or constipation. No melena or hematochezia.  GENITOURINARY: No dysuria, frequency or hematuria  NEUROLOGICAL: No numbness or weakness  SKIN: No itching, rashes REVIEW OF SYSTEMS:    CONSTITUTIONAL: No weakness, fevers or chills  EYES/ENT: No visual changes;  No vertigo or throat pain   NECK: No pain or stiffness  RESPIRATORY: No cough, wheezing, hemoptysis; No shortness of breath  CARDIOVASCULAR: No chest pain or palpitations  GASTROINTESTINAL: +abdominal pain localized to epigastrium and RUQ. +nausea, +vomiting, hematemesis; No diarrhea or constipation. No melena or hematochezia.  GENITOURINARY: No dysuria, frequency or hematuria  NEUROLOGICAL: No numbness or weakness  SKIN: No itching, rashes

## 2023-01-18 NOTE — ED PROVIDER NOTE - PROGRESS NOTE DETAILS
Initial US guided IV infiltrated.  Replaced.  Labs reviewed, CT concerning for acute choley based on my initial read but no prelim from rads right now.  Will receive fluids, IV abx, pain Rx, and subcutaneous insulin (0.5u/kg).  Will c/s surgery.  --DANG

## 2023-01-18 NOTE — H&P ADULT - MLM HIDDEN
yes PAST MEDICAL HISTORY:  Alcohol addiction     Anxiety disorder     PTSD (post-traumatic stress disorder)

## 2023-01-18 NOTE — H&P ADULT - PROBLEM SELECTOR PLAN 1
Patient presenting with 1 week of abdominal pain, nausea, and vomiting  - CT A/P: acute cholecystitis with 1.2 cm common bile duct dilatation  - per surgery, not a surgical candidate given comorbidities     Plan:  > IR consult for percutaneous cholecystostomy  > continue with zosyn  > judicious IV hydration only if necessary given HFpEF with recent admission for decompensated heart failure Patient presenting with 1 week of abdominal pain, nausea, and vomiting  - CT A/P: acute cholecystitis with 1.2 cm common bile duct dilatation  - per surgery, not a surgical candidate given comorbidities     Plan:  > IR consulted for percutaneous cholecystostomy:  > GI consulted for choledocholithiasis: will need ERCP, f/u cards consult in AM for pre-op optimization, NPO after midnight  > continue with zosyn  > judicious IV hydration NS 70 cc/hr x 10 hr  > pain/nausea control with tylenol, morphine, maalox  > AVOID zofran given QTc 518  > trend CMP, coags

## 2023-01-18 NOTE — H&P ADULT - PROBLEM SELECTOR PLAN 5
Diet: NPO  DVT PPx: on home eliquis 2.5 BID given hx of DVT  Dispo: pending clinical improvement Diet: NPO  DVT PPx: hold eliquis for procedure, resume after  Dispo: pending clinical improvement

## 2023-01-18 NOTE — ED PROVIDER NOTE - NS ED ROS FT
Constitutional: No fever or chills  Eyes: No visual changes, eye pain or redness  HEENT: No throat pain, ear pain, nasal pain. No nose bleeding.  CV: No chest pain or lower extremity edema  Resp: No SOB no cough  GI: +abd pain. +nausea ++vomiting. No diarrhea. No constipation.   : No dysuria, hematuria.   MSK: No musculoskeletal pain  Skin: No rash  Neuro: No headache. No numbness or tingling. No weakness.

## 2023-01-18 NOTE — H&P ADULT - NSHPPHYSICALEXAM_GEN_ALL_CORE
VITALS:   T(C): --  HR: 78 (01-18-23 @ 08:43) (78 - 78)  BP: 152/74 (01-18-23 @ 08:43) (152/74 - 152/74)  RR: 20 (01-18-23 @ 08:43) (20 - 20)  SpO2: 95% (01-18-23 @ 08:43) (95% - 95%)    GENERAL: NAD, lying in bed comfortably  HEAD:  Atraumatic, normocephalic  EYES: EOMI, PERRLA, conjunctiva and sclera clear  ENT: Moist mucous membranes  NECK: Supple, no JVD  HEART: Regular rate and rhythm, no murmurs, rubs, or gallops  LUNGS: Unlabored respirations.  Clear to auscultation bilaterally, no crackles, wheezing, or rhonchi  ABDOMEN: Soft, nontender, nondistended, +BS  EXTREMITIES: 2+ peripheral pulses bilaterally. No clubbing, cyanosis, or edema  NERVOUS SYSTEM:  A&Ox3, no focal deficits   SKIN: No rashes or lesions VITALS:   T(C): --  HR: 78 (01-18-23 @ 08:43) (78 - 78)  BP: 152/74 (01-18-23 @ 08:43) (152/74 - 152/74)  RR: 20 (01-18-23 @ 08:43) (20 - 20)  SpO2: 95% (01-18-23 @ 08:43) (95% - 95%)    GENERAL: NAD, lying in bed comfortably  HEAD:  Atraumatic, normocephalic  EYES: EOMI, PERRLA, conjunctiva and sclera clear  ENT: Moist mucous membranes  NECK: Supple, no JVD  HEART: Regular rate and rhythm, no murmurs, rubs, or gallops  LUNGS: Unlabored respirations.  Clear to auscultation bilaterally, no crackles, wheezing, or rhonchi  ABDOMEN: +TTP RUQ > epigastrium, +Zafar's, no guarding or rebound soft, nondistended, +BS  EXTREMITIES: +bilateral UE lymphedema R > L, 2+ bilateral LE pitting edema, 2+ peripheral pulses bilaterally, femoral central line in place c/d/i  NERVOUS SYSTEM: A&Ox3, no focal deficits   SKIN: No rashes or lesions

## 2023-01-18 NOTE — CONSULT NOTE ADULT - ASSESSMENT
80 year old F with a PMHx of HTN, HLD, T2DM, HFpEF (EF 65%), breast cancer s/p R partial mastectomy, rectal carcinoma s/p resection, CAD s/p CABG (11/2020) who presents for 1 week of progressively worsening intermittent abdominal pain as well as N/V - found to have acute rissa, choledocho and DKA.    Impression:  #Choledocholithiasis with possible cholangitis given leukocytosis. Unclear if febrile as no temperature recorded.   #Acute rissa - deemed not a surgical candidate  #DKA, elevated lactate - elevated BHB, hyperglycemia, HAGMA   #HFpEF  #DVT on eliquis     Recommendation:  - patient needs ERCP for choledocho however hemodynamically stable and currently not optimized for endoscopic procedure with general anesthesia given DKA and recent eliquis administration (to allow for sphincterotomy)  - Cardiology consult given recent admission for ADHF for clearance for endoscopic procedure  - hold eliquis  - if decompensation, can plan for more u rgent ERCP without sphincterotomy and stent placement  - NPO after midnight  - glucose control per primary team  - c/w ab  - IVF as able however with CKD and HFpEF  - trend CBC, CMP, coags    Note not finalized until signed by attending.    Delaney Bass PGY-6  Gastroenterology/Hepatology Fellow  Pager #[phone] ([facility]) or [phone] ([facility])  Available on [employer] Teams.  Please contact on-call GI fellow via answering service ([phone]) after 5pm and before 8am, and on weekends.

## 2023-01-18 NOTE — H&P ADULT - HISTORY OF PRESENT ILLNESS
Patient is a 80 year old F with a PMHx of HTN, HLD, T2DM, HFpEF (EF 65% X/2022), breast cancer s/p R partial mastectomy, rectal carcinoma s/p resection, CAD s/p CABG (11/2020) who presents for 1 week of progressively worsening intermittent abdominal pain.     ED course: Vitals stable upon arrival (HR 78, /74, T _ C, SpO2 95% on RA). Labs were WBC 16.6 with L shift, AG 19, BUN 34, Cr 1.77, serum glucose of 450, BHB of 2, , pH of 7.38, lactate of 2.6. CT A/P with concern for acute cholecystitis with choledocholithiasis, but deemed not a surgical candidate per surgery. The patient was given 1x zosyn for cholecystitis, as well as 750cc of NS and 4U regular insulin for mild DKA. The patient also received pepcid, maalox, zofran, and morphine for abdominal pain and nausea.  Patient is a 80 year old F with a PMHx of HTN, HLD, T2DM, HFpEF (EF 65% X/2022), breast cancer s/p R partial mastectomy, rectal carcinoma s/p resection, CAD s/p CABG (11/2020) who presents for 1 day of progressively worsening abdominal pain associated with nausea and NBNB vomiting. The patient's daughter reports that the patient had one self resolving episode of abdominal pain on 1/15, which they had initially attributed to gas and bloating. The pain returned again on the night prior to arrival and progressively worsened throughout the night. The patient denies any fever, chills, shortness of breath, chest pain, constipation, or diarrhea.    ED course: Vitals stable upon arrival (HR 78, /74, SpO2 95% on RA, afebrile). Labs were WBC 16.6 with L shift, AG 19, BUN 34, Cr 1.77, serum glucose of 450, BHB of 2, , pH of 7.38, lactate of 2.6. CT A/P with concern for acute cholecystitis with choledocholithiasis, but deemed not a surgical candidate per surgery. The patient was given 1x zosyn for cholecystitis, as well as 750cc of NS and 4U regular insulin for mild DKA. The patient also received pepcid, maalox, zofran, and morphine for abdominal pain and nausea.

## 2023-01-18 NOTE — CONSULT NOTE ADULT - SUBJECTIVE AND OBJECTIVE BOX
Interventional Radiology    Evaluate for Procedure: Percutaneous cholecystostomy    HPI: 82y Female with Hx of CAD s/p CABG, HF, DVT, breast cancer s/p partial right mastectomy, rectal cancer s/p resection here for abdominal pain. CT Abdomen Pelvis dated 1/18/23 showed acute cholecystitis, choledocholithiasis with stones in the gallbladder and CBD. CBD normal width measuring 6.5 mm. Labs show leukocytosis (16.6) and normal LFTs. General Surgery consulted, and stated "no surgical intervention at this time given poor operative candidate". General Surgery recommended IR consult for percutaneous cholecystostomy. Pt took Eliquis last on 1/17 PM.    Allergies: CT scan dye (Hives)  penicillin (Unknown)    Medications (Abx/Cardiac/Anticoagulation/Blood Products)    piperacillin/tazobactam IVPB...: 200 mL/Hr IV Intermittent (01-18 @ 11:04)    Data:    T(C): --  HR: 78  BP: 152/74  RR: 20  SpO2: 95%    -WBC 16.66 / HgB 13.6 / Hct 42.6 / Plt 295  -Na 135 / Cl 94 / BUN 34 / Glucose 458  -K 4.4 / CO2 22 / Cr 1.77  -ALT 16 / Alk Phos 99 / T.Bili 0.5  -INR 1.28 / PTT 27.3    Radiology: images reviewed    Assessment/Plan:   -82y Female with CAD s/p CABG, HF, DVT, breast cancer s/p partial right mastectomy, rectal cancer s/p resection here for acute cholecystitis and choledocholithiasis. Elevated WBC (16.6) with normal LFTs. IR consulted for percutaneous cholecystostomy.    - Continue antibiotics  - Continue holding anticoagulation  - Agree with General Surgery's recommendation to consult GI given choledocholithiasis  - Will keep monitoring patient. If patient's status (WBC count, fever) worsens over next 24-48 hrs despite antibiotics, will consider percutaneous cholecystostomy

## 2023-01-18 NOTE — ED ADULT TRIAGE NOTE - PAIN RATING/NUMBER SCALE (0-10): ACTIVITY
Called and informed Lynda that per dr. Nogueira overall labs were pretty good except for the glucose which is in diabetic range.  A1C is consistent with new onset diabetes.  Diabetic education is recommended.  Order placed.  Start metformin  mg daily at bedtime.  Script sent.  Eye exam recommended.  Order faxed to dr. Sanchez.  See dr. Nogueira again in office in 6 weeks.  Patient verbalized understanding.   5

## 2023-01-18 NOTE — ED PROVIDER NOTE - NS ED ATTENDING STATEMENT MOD
This was a shared visit with the ZANE. I reviewed and verified the documentation and independently performed the documented:

## 2023-01-18 NOTE — CONSULT NOTE ADULT - SUBJECTIVE AND OBJECTIVE BOX
Chief Complaint:  Patient is a 82y old  Female who presents with a chief complaint of acute cholecystitis, DKA (18 Jan 2023 15:32)      HPI: 80 year old F with a PMHx of HTN, HLD, T2DM, HFpEF (EF 65%), breast cancer s/p R partial mastectomy, rectal carcinoma s/p resection, CAD s/p CABG (11/2020) who presents for 1 week of progressively worsening intermittent abdominal pain as well as N/V. Currently reports right sided abdominal pain as well as epigastric pain.     ED course: Vitals stable upon arrival (HR 78, /74, SpO2 95% on RA). No temperature recorded. Labs notable for leukocytosis, elevated transaminases and bili as well as HAGMA with hyperglycemia and elevated BHB and lactate.  Evaluated by surgery and deemed not a surgical candidate.     Allergies:  CT scan dye (Hives)  penicillin (Unknown)      Home Medications:    Hospital Medications:      PMHX/PSHX:  DM (diabetes mellitus)    HTN (hypertension)    Hyperlipidemia    Obesity (BMI 35.0-39.9 without comorbidity)    Breast CA, right    Colon cancer    Lymphedema of arm    Cancer    Rectal cancer    Breast CA, right    HTN (hypertension)    Diabetes mellitus    Lymphedema of arm    Hyperlipidemia    Rectal cancer    S/P chemotherapy, time since greater than 12 weeks    S/P radiation &gt; 12 weeks    Obese    Type II diabetes mellitus    CHF (congestive heart failure)    DVT of leg (deep venous thrombosis)    Elevated blood pressure reading in office with diagnosis of hypertension    Renal insufficiency    H/O mastectomy, right    History of tonsillectomy    S/P colectomy    S/P mastectomy, right    History of appendectomy    S/P ileostomy    S/P colon resection    S/P TKR (total knee replacement), right        Family history:  No pertinent family history in first degree relatives    No pertinent family history in first degree relatives    Family history of diabetes mellitus in mother    Family history of diabetes mellitus in son    Family history of heart attack (Child)        Social History:     ROS:     General:  No weight loss, fevers, chills, night sweats, fatigue  Eyes:  No vision changes, no yellowing of eyes   ENT:  No throat pain, runny nose  CV:  No chest pain, palpitations  Resp:  No SOB, cough, wheezing  GI:  See HPI  :  No burning with urination, no hematuria   Muscle:  No muscle pain, weakness  Neuro:  No numbness/tingling, memory problems  Psych:  No fatigue, insomnia, mood problems  Heme:  No easy bruisability  Skin:  No rash, itching       PHYSICAL EXAM:     GENERAL:  no distress  HEENT:  NC/AT,  conjunctivae clear and pink,  no JVD  CHEST:  Full & symmetric excursion, no increased effort, breath sounds clear  HEART:  Regular rhythm, S1, S2, no murmur/rub/S3/S4, no abdominal bruit, no edema  ABDOMEN:  Soft, tender to palpation along entire right abdomen and epigastric area, no guarding, multiple abdominal healed scars  EXTREMITIES:  no cyanosis,clubbing or edema  SKIN:  No rash/erythema/ecchymoses/petechiae/wounds/abscess/warm/dry  NEURO:  Alert, oriented    Vital Signs:  Vital Signs Last 24 Hrs  T(C): --  T(F): --  HR: 78 (18 Jan 2023 08:43) (78 - 78)  BP: 152/74 (18 Jan 2023 08:43) (152/74 - 152/74)  BP(mean): --  RR: 20 (18 Jan 2023 08:43) (20 - 20)  SpO2: 95% (18 Jan 2023 08:43) (95% - 95%)    Parameters below as of 18 Jan 2023 08:43  Patient On (Oxygen Delivery Method): room air      Daily     Daily     LABS:                        13.6   16.66 )-----------( 295      ( 18 Jan 2023 10:23 )             42.6     01-18    135  |  94<L>  |  34<H>  ----------------------------<  458<HH>  4.4   |  22  |  1.77<H>    Ca    10.1      18 Jan 2023 10:23  Mg     1.9     01-18    TPro  7.6  /  Alb  4.2  /  TBili  0.5  /  DBili  x   /  AST  15  /  ALT  16  /  AlkPhos  99  01-18    LIVER FUNCTIONS - ( 18 Jan 2023 10:23 )  Alb: 4.2 g/dL / Pro: 7.6 g/dL / ALK PHOS: 99 U/L / ALT: 16 U/L / AST: 15 U/L / GGT: x               Amylase Serum--      Lipase serum20       Ammonia--      Imaging:    < from: CT Abdomen and Pelvis No Cont (01.18.23 @ 11:02) >  FINDINGS:  LOWER CHEST: Cardiomegaly. Coronary artery calcifications. Median   sternotomy.    LIVER: Within normal limits.  BILE DUCTS: Calcifications within the mid to distal CBD, spanning   approximately 1.7 cm. Upper limits of normal caliber CBD measuring 7 mm.   No evidence of intrahepatic biliary ductal dilatation.  GALLBLADDER: Distended with numerous stones, including stones within the   neck. Extensive pericholecystic infiltration.  SPLEEN: Normal in size. Calcifications along the splenic capsule.  PANCREAS: Mild to moderate fatty atrophy.  ADRENALS: Within normal limits.  KIDNEYS/URETERS: Severe chronic right hydronephrosis with cortical   atrophy, decreased compared to 9/7/2022. The left kidney is within normal   limits.    BLADDER: Within normal limits.  REPRODUCTIVE ORGANS: Multiple calcified uterinefibroids. Adnexa are   within normal limits.    BOWEL: No bowel obstruction. Colorectal anastomosis. Additional small   bowel anastomosis in the mid lower pelvis. Appendix is not visualized.  PERITONEUM: Fat stranding in the gallbladder fossa. No focal collection.  VESSELS: Atherosclerotic changes.  RETROPERITONEUM/LYMPH NODES: No lymphadenopathy.  ABDOMINAL WALL: Lobulated infraumbilical fat-containing dominant wall   hernia.  BONES: Right hip arthroplasty. Osseous degenerative changes.    IMPRESSION:  1.  Acute cholecystitis.  2.  Choledocholithiasis.    Findings were discussed with provider BRYANNA MORALES on 1/18/2023 at 12:30   PM by Dr. GALLARDO with read back confirmation.    --- End of Report ---    < end of copied text >

## 2023-01-18 NOTE — ED ADULT NURSE REASSESSMENT NOTE - NS ED NURSE REASSESS COMMENT FT1
Attempted to place peripheral IV, failed x2.  Notified provider.   Provider placed IV x2 and it infiltrated twice.  Provider recommended central line, and family( daughter) at bedside is refusing to place central line.   Notified charge nurse.

## 2023-01-18 NOTE — ED PROVIDER NOTE - OBJECTIVE STATEMENT
83 yo F wit a PMH of breast CA, DM, HTN, HLD, CHF, chronic RUE lymphedema bibems for abdominal pain 82yof w/ hx of HTN, hyperlipidemia, type 2 diabetes mellitus, breast cancer status post right partial mastectomy, rectal carcinoma status post resection, anemia, diastolic heart failure, hx of right lower extremity DVT, CAD s/p 2 vessel CABG 11/9/2020 and renal insufficiency, recently admitted 8/2022 for volume overload now bibems for abd pain 82yof w/ hx of HTN, hyperlipidemia, type 2 diabetes mellitus, breast cancer status post right partial mastectomy, rectal carcinoma status post resection, anemia, diastolic heart failure, hx of right lower extremity DVT, CAD s/p 2 vessel CABG 11/9/2020 and renal insufficiency, recently admitted 8/2022 for volume overload now bibems for abd pain    Meds -- spironolactone, atrovastatin, norvasc, bumex, lopressor, eloquis, repaglinide, semaglutide, asa [age]yof w/ hx of HTN, hyperlipidemia, type 2 diabetes mellitus, breast cancer status post right partial mastectomy, rectal carcinoma status post resection, anemia, diastolic heart failure, hx of right lower extremity DVT, CAD s/p 2 vessel CABG 11/9/2020 and renal insufficiency, recently admitted 8/2022 for volume overload now bibems for abd pain; Pt primary [place] speaking- daughter at bedside. reports intermittent abd pain for past week, pt was suppose to be on clinda for dental infx but stopped on Sunday due to worsening pain and since last night around 2300 with significant epigastric pain with multiple NB NB vomiting unable to tolerate PO. no fever or chills.     Meds -- spironolactone, atrovastatin, norvasc, bumex, lopressor, eloquis, repaglinide, semaglutide, asa 81

## 2023-01-18 NOTE — CONSULT NOTE ADULT - ASSESSMENT
Assessment: 82 year old woman with acute cholecystitis and mutliple medical comorbidities presents with abdominal pain.    Recs:  - Medicine admission for hyperglycemia, optimization of CHF, diabetes, CKD  - Last took eliquis last night, would hold as patient may require perc rissa   - No surgical intervention at this time given poor operative candidate   - GI consult for CBD 1.2cm  - Recommend IR to eval for perc rissa   - D/w Attending Surgeon Dr. ISAMAR Buck MD, PGY2  Red Surgery   [id number]

## 2023-01-18 NOTE — ED PROCEDURE NOTE - PROCEDURE ADDITIONAL DETAILS
verbal consent obtained from both patient and patient's daughter secondary to emergent nature of procedure.  patient had a right femoral triple lumen catheter placed under ultrasound guidance without complications.  patient tolerated the procedure well.
Emergency Department Focused Ultrasound performed at patient's bedside for educational purposes. An appropriate follow up study is ordered. -Mayuri Lal PA-C

## 2023-01-18 NOTE — ED ADULT NURSE NOTE - NSFALLRSKPASTHIST_ED_ALL_ED
no Detail Level: Detailed Quality 431: Preventive Care And Screening: Unhealthy Alcohol Use - Screening: Patient screened for unhealthy alcohol use using a single question and scores less than 2 times per year Quality 110: Preventive Care And Screening: Influenza Immunization: Influenza Immunization Administered during Influenza season Quality 402: Tobacco Use And Help With Quitting Among Adolescents: Patient screened for tobacco and never smoked

## 2023-01-19 LAB
A1C WITH ESTIMATED AVERAGE GLUCOSE RESULT: 9.6 % — HIGH (ref 4–5.6)
ALBUMIN SERPL ELPH-MCNC: 3.6 G/DL — SIGNIFICANT CHANGE UP (ref 3.3–5)
ALP SERPL-CCNC: 99 U/L — SIGNIFICANT CHANGE UP (ref 40–120)
ALT FLD-CCNC: 49 U/L — HIGH (ref 10–45)
ANION GAP SERPL CALC-SCNC: 13 MMOL/L — SIGNIFICANT CHANGE UP (ref 5–17)
APTT BLD: 30.7 SEC — SIGNIFICANT CHANGE UP (ref 27.5–35.5)
AST SERPL-CCNC: 49 U/L — HIGH (ref 10–40)
BILIRUB SERPL-MCNC: 0.7 MG/DL — SIGNIFICANT CHANGE UP (ref 0.2–1.2)
BUN SERPL-MCNC: 26 MG/DL — HIGH (ref 7–23)
CALCIUM SERPL-MCNC: 9.4 MG/DL — SIGNIFICANT CHANGE UP (ref 8.4–10.5)
CHLORIDE SERPL-SCNC: 100 MMOL/L — SIGNIFICANT CHANGE UP (ref 96–108)
CHOLEST SERPL-MCNC: 93 MG/DL — SIGNIFICANT CHANGE UP
CO2 SERPL-SCNC: 26 MMOL/L — SIGNIFICANT CHANGE UP (ref 22–31)
CREAT SERPL-MCNC: 1.62 MG/DL — HIGH (ref 0.5–1.3)
EGFR: 31 ML/MIN/1.73M2 — LOW
ESTIMATED AVERAGE GLUCOSE: 229 MG/DL — HIGH (ref 68–114)
GLUCOSE BLDC GLUCOMTR-MCNC: 134 MG/DL — HIGH (ref 70–99)
GLUCOSE BLDC GLUCOMTR-MCNC: 167 MG/DL — HIGH (ref 70–99)
GLUCOSE BLDC GLUCOMTR-MCNC: 190 MG/DL — HIGH (ref 70–99)
GLUCOSE SERPL-MCNC: 203 MG/DL — HIGH (ref 70–99)
HCT VFR BLD CALC: 42 % — SIGNIFICANT CHANGE UP (ref 34.5–45)
HDLC SERPL-MCNC: 53 MG/DL — SIGNIFICANT CHANGE UP
HGB BLD-MCNC: 13.2 G/DL — SIGNIFICANT CHANGE UP (ref 11.5–15.5)
INR BLD: 1.35 RATIO — HIGH (ref 0.88–1.16)
LACTATE SERPL-SCNC: 1.7 MMOL/L — SIGNIFICANT CHANGE UP (ref 0.5–2)
LIPID PNL WITH DIRECT LDL SERPL: 26 MG/DL — SIGNIFICANT CHANGE UP
MCHC RBC-ENTMCNC: 29.8 PG — SIGNIFICANT CHANGE UP (ref 27–34)
MCHC RBC-ENTMCNC: 31.4 GM/DL — LOW (ref 32–36)
MCV RBC AUTO: 94.8 FL — SIGNIFICANT CHANGE UP (ref 80–100)
NON HDL CHOLESTEROL: 40 MG/DL — SIGNIFICANT CHANGE UP
NRBC # BLD: 0 /100 WBCS — SIGNIFICANT CHANGE UP (ref 0–0)
PLATELET # BLD AUTO: 262 K/UL — SIGNIFICANT CHANGE UP (ref 150–400)
POTASSIUM SERPL-MCNC: 4.1 MMOL/L — SIGNIFICANT CHANGE UP (ref 3.5–5.3)
POTASSIUM SERPL-SCNC: 4.1 MMOL/L — SIGNIFICANT CHANGE UP (ref 3.5–5.3)
PROT SERPL-MCNC: 6.8 G/DL — SIGNIFICANT CHANGE UP (ref 6–8.3)
PROTHROM AB SERPL-ACNC: 15.7 SEC — HIGH (ref 10.5–13.4)
RBC # BLD: 4.43 M/UL — SIGNIFICANT CHANGE UP (ref 3.8–5.2)
RBC # FLD: 15.2 % — HIGH (ref 10.3–14.5)
SODIUM SERPL-SCNC: 139 MMOL/L — SIGNIFICANT CHANGE UP (ref 135–145)
TRIGL SERPL-MCNC: 69 MG/DL — SIGNIFICANT CHANGE UP
TSH SERPL-MCNC: 0.62 UIU/ML — SIGNIFICANT CHANGE UP (ref 0.27–4.2)
WBC # BLD: 15.27 K/UL — HIGH (ref 3.8–10.5)
WBC # FLD AUTO: 15.27 K/UL — HIGH (ref 3.8–10.5)

## 2023-01-19 PROCEDURE — 99233 SBSQ HOSP IP/OBS HIGH 50: CPT | Mod: GC

## 2023-01-19 RX ORDER — INSULIN LISPRO 100/ML
VIAL (ML) SUBCUTANEOUS
Refills: 0 | Status: DISCONTINUED | OUTPATIENT
Start: 2023-01-19 | End: 2023-01-22

## 2023-01-19 RX ORDER — INSULIN GLARGINE 100 [IU]/ML
10 INJECTION, SOLUTION SUBCUTANEOUS AT BEDTIME
Refills: 0 | Status: DISCONTINUED | OUTPATIENT
Start: 2023-01-19 | End: 2023-01-27

## 2023-01-19 RX ADMIN — Medication 150 MILLIGRAM(S): at 05:21

## 2023-01-19 RX ADMIN — PIPERACILLIN AND TAZOBACTAM 25 GRAM(S): 4; .5 INJECTION, POWDER, LYOPHILIZED, FOR SOLUTION INTRAVENOUS at 18:23

## 2023-01-19 RX ADMIN — GABAPENTIN 200 MILLIGRAM(S): 400 CAPSULE ORAL at 05:22

## 2023-01-19 RX ADMIN — CHLORHEXIDINE GLUCONATE 1 APPLICATION(S): 213 SOLUTION TOPICAL at 05:21

## 2023-01-19 RX ADMIN — Medication 81 MILLIGRAM(S): at 11:18

## 2023-01-19 RX ADMIN — GABAPENTIN 200 MILLIGRAM(S): 400 CAPSULE ORAL at 21:32

## 2023-01-19 RX ADMIN — Medication 2: at 05:29

## 2023-01-19 RX ADMIN — AMLODIPINE BESYLATE 5 MILLIGRAM(S): 2.5 TABLET ORAL at 05:22

## 2023-01-19 RX ADMIN — PIPERACILLIN AND TAZOBACTAM 25 GRAM(S): 4; .5 INJECTION, POWDER, LYOPHILIZED, FOR SOLUTION INTRAVENOUS at 11:18

## 2023-01-19 RX ADMIN — Medication 2: at 21:34

## 2023-01-19 RX ADMIN — Medication 650 MILLIGRAM(S): at 11:18

## 2023-01-19 RX ADMIN — SPIRONOLACTONE 12.5 MILLIGRAM(S): 25 TABLET, FILM COATED ORAL at 05:22

## 2023-01-19 RX ADMIN — ATORVASTATIN CALCIUM 40 MILLIGRAM(S): 80 TABLET, FILM COATED ORAL at 21:33

## 2023-01-19 RX ADMIN — PIPERACILLIN AND TAZOBACTAM 25 GRAM(S): 4; .5 INJECTION, POWDER, LYOPHILIZED, FOR SOLUTION INTRAVENOUS at 03:36

## 2023-01-19 RX ADMIN — Medication 2: at 00:11

## 2023-01-19 RX ADMIN — Medication 650 MILLIGRAM(S): at 12:00

## 2023-01-19 RX ADMIN — Medication 2: at 17:08

## 2023-01-19 RX ADMIN — INSULIN GLARGINE 10 UNIT(S): 100 INJECTION, SOLUTION SUBCUTANEOUS at 21:33

## 2023-01-19 RX ADMIN — GABAPENTIN 200 MILLIGRAM(S): 400 CAPSULE ORAL at 13:39

## 2023-01-19 NOTE — PROGRESS NOTE ADULT - ASSESSMENT
Patient is a 82 year old F with a PMHx of HTN, HLD, T2DM, HFpEF (EF 65% X/2022), breast cancer s/p R partial mastectomy, rectal carcinoma s/p resection, CAD s/p CABG who presents for 1 week of intermittent abdominal pain associated with nausea and vomiting, found to have acute cholecystitis with choledocholithiasis as well as mild DKA

## 2023-01-19 NOTE — CONSULT NOTE ADULT - SUBJECTIVE AND OBJECTIVE BOX
Cardiovascular Disease Initial Evaluation  Date of Service: 01-19-23 @ 11:24    CHIEF COMPLAINT: Belly pain    HISTORY OF PRESENT ILLNESS:    This is an 80 year old woman with CAD s/p CABG in 2020, prior DVT on Eliquis, HTN, and compensated diastolic CHF who presented to [facility] on 1/18/2023 with one day of progressively worsening abdominal pain associated with nausea and NBNB vomiting. The patient's daughter reports that the patient had one self resolving episode of abdominal pain on 1/15, which they had initially attributed to gas and bloating. The pain returned again on the night prior to arrival and progressively worsened throughout the night. The patient denies any fever, chills, shortness of breath, chest pain, constipation, or diarrhea.    The patient was noted to have acute cholecystitis and the plan is now for ERCP.  She denies chest pain or SOB.       Allergies  CT scan dye (Hives)  penicillin (Unknown)    	    MEDICATIONS:  amLODIPine   Tablet 5 milliGRAM(s) Oral daily  aspirin  chewable 81 milliGRAM(s) Oral daily  metoprolol succinate  milliGRAM(s) Oral daily  spironolactone 12.5 milliGRAM(s) Oral daily    piperacillin/tazobactam IVPB.. 3.375 Gram(s) IV Intermittent every 8 hours      acetaminophen     Tablet .. 650 milliGRAM(s) Oral every 6 hours PRN  gabapentin 200 milliGRAM(s) Oral three times a day  morphine  - Injectable 1 milliGRAM(s) IV Push every 6 hours PRN    aluminum hydroxide/magnesium hydroxide/simethicone Suspension 30 milliLiter(s) Oral every 6 hours PRN    atorvastatin 40 milliGRAM(s) Oral at bedtime  dextrose 50% Injectable 25 Gram(s) IV Push once  dextrose 50% Injectable 12.5 Gram(s) IV Push once  dextrose 50% Injectable 25 Gram(s) IV Push once  dextrose Oral Gel 15 Gram(s) Oral once PRN  glucagon  Injectable 1 milliGRAM(s) IntraMuscular once  insulin lispro (ADMELOG) corrective regimen sliding scale   SubCutaneous every 6 hours    chlorhexidine 4% Liquid 1 Application(s) Topical <User Schedule>  dextrose 5%. 1000 milliLiter(s) IV Continuous <Continuous>  dextrose 5%. 1000 milliLiter(s) IV Continuous <Continuous>  sodium chloride 0.9% lock flush 10 milliLiter(s) IV Push every 1 hour PRN  sodium chloride 0.9%. 1000 milliLiter(s) IV Continuous <Continuous>      PAST MEDICAL & SURGICAL HISTORY:  Breast CA, right  1989 s/p mastectomy ,IV Chemotherapy      HTN (hypertension)      Lymphedema of arm  right arm s/p mastectomy      Hyperlipidemia      Rectal cancer  s/p chemotherapy and  radiation 12 weeks 2/2015 -3/2015      Obese      Type II diabetes mellitus      CHF (congestive heart failure)      DVT of leg (deep venous thrombosis)  right calf DVT  2/2019 pt on Eliquis      Renal insufficiency      S/P mastectomy, right  1989      History of appendectomy  1953      S/P ileostomy  low anterior resection-8/10/15, reversal of ileostomy 2016      S/P colon resection  2015      S/P TKR (total knee replacement), right  2017          FAMILY HISTORY:  Family history of diabetes mellitus in mother    Family history of diabetes mellitus in son    Family history of heart attack (Child)        SOCIAL HISTORY:    The patient is a nonsmoker       REVIEW OF SYSTEMS:  See HPI, otherwise complete 14 point review of systems negative      PHYSICAL EXAM:  T(C): 37.1 (01-19-23 @ 08:15), Max: 37.4 (01-18-23 @ 22:29)  HR: 97 (01-19-23 @ 08:15) (86 - 98)  BP: 145/80 (01-19-23 @ 08:15) (110/69 - 145/80)  RR: 17 (01-19-23 @ 08:15) (16 - 18)  SpO2: 93% (01-19-23 @ 08:15) (93% - 98%)  Wt(kg): --  I&O's Summary      Appearance: No Acute Distress; resting comfortably  HEENT:  Normal oral mucosa, PERRL, EOMI	  Cardiovascular: Normal S1 S2, No JVD, No murmurs/rubs/gallops  Respiratory: Normal respiratory effort; Lungs clear to auscultation bilaterally  Gastrointestinal:  Soft, tender, + BS	  Skin: No rashes, No ecchymoses, No cyanosis	  Neurologic: Non-focal; no weakness  Extremities: No clubbing, cyanosis or edema  Vascular: Peripheral pulses palpable 2+ bilaterally  Psychiatry: A & O x 3, Mood & affect appropriate    Laboratory Data:	 	    CBC Full  -  ( 19 Jan 2023 06:51 )  WBC Count : 15.27 K/uL  Hemoglobin : 13.2 g/dL  Hematocrit : 42.0 %  Platelet Count - Automated : 262 K/uL  Mean Cell Volume : 94.8 fl  Mean Cell Hemoglobin : 29.8 pg  Mean Cell Hemoglobin Concentration : 31.4 gm/dL  Auto Neutrophil # : x  Auto Lymphocyte # : x  Auto Monocyte # : x  Auto Eosinophil # : x  Auto Basophil # : x  Auto Neutrophil % : x  Auto Lymphocyte % : x  Auto Monocyte % : x  Auto Eosinophil % : x  Auto Basophil % : x    01-19    139  |  100  |  26<H>  ----------------------------<  203<H>  4.1   |  26  |  1.62<H>  01-18    136  |  98  |  31<H>  ----------------------------<  323<H>  4.2   |  24  |  1.58<H>    Ca    9.4      19 Jan 2023 06:58  Ca    9.4      18 Jan 2023 16:14  Mg     1.9     01-18    TPro  6.8  /  Alb  3.6  /  TBili  0.7  /  DBili  x   /  AST  49<H>  /  ALT  49<H>  /  AlkPhos  99  01-19  TPro  7.6  /  Alb  4.2  /  TBili  0.5  /  DBili  x   /  AST  15  /  ALT  16  /  AlkPhos  99  01-18      Interpretation of Telemetry: n/a	    ECG:  	Sinus; anteroseptal q-waves    Assessment:  80 year old woman with CAD s/p CABG in 2020, prior DVT on Eliquis, HTN, and compensated diastolic CHF presents with acute cholecystitis.     Plan of Care:    #Pre-operative risk evaluation-  Ms. Batista displays no signs or symptoms of acute coronary ischemia or decompensated CHF.  Admission EKG is sinus with pre-existing anteroseptal q-waves.  Recent echo noted- normal LV systolic function with no hemodynamically significant valvular disease.  Ms. Batista is optimized from a cardiac standpoint to proceed with low to intermediate risk EGD with ERCP.   Please continue beta blocker therapy throughout the chase-operative period.     #Compensated diastolic CHF-  Patient is euvolemic on exam.  Hold home diuretics given NPO status.     #CAD s/p CABG-  No signs of active ischemic.  ASA and statin as ordered.    #ACP (advance care planning)-  Advanced care planning was discussed with Ms. Batista with the assistance of [facility].    EKG, echo, and lab findings were discussed in detail and all questions were answered.       72 minutes spent on total encounter; more than 50% of the visit was spent counseling and/or coordinating care by the attending physician.   	  Keith Umana MD [facility]  Cardiovascular Diseases  [phone]

## 2023-01-19 NOTE — PROGRESS NOTE ADULT - SUBJECTIVE AND OBJECTIVE BOX
SUBJECTIVE:   Seen and examined.     OBJECTIVE: T(C): 36.8 (23 @ 11:26), Max: 37.4 (23 @ 22:29)  HR: 98 (23 @ 11:26) (86 - 98)  BP: 104/53 (23 @ 11:26) (104/53 - 145/80)  RR: 17 (23 @ 11:26) (16 - 18)  SpO2: 93% (23 @ 11:26) (93% - 98%)  Wt(kg): --  I&O's Summary    2023 07:  -  2023 12:29  --------------------------------------------------------  IN: 0 mL / OUT: 300 mL / NET: -300 mL      I&O's Detail    2023 07:  -  2023 12:29  --------------------------------------------------------  IN:  Total IN: 0 mL    OUT:    Voided (mL): 300 mL  Total OUT: 300 mL    Total NET: -300 mL        PHYSICAL EXAM:   General: NAD, uncomfortable appearing   Resp: Good effort, CTA b/l  GI/Abd: Multiple well healed scars, mildly tender RUQ   Skin: Intact, no breakdown, no jaundice   Musculoskeletal: All 4 extremities moving spontaneously, no limitations, lymphedema RUE    MEDICATIONS  (STANDING):  amLODIPine   Tablet 5 milliGRAM(s) Oral daily  aspirin  chewable 81 milliGRAM(s) Oral daily  atorvastatin 40 milliGRAM(s) Oral at bedtime  chlorhexidine 4% Liquid 1 Application(s) Topical <User Schedule>  dextrose 5%. 1000 milliLiter(s) (100 mL/Hr) IV Continuous <Continuous>  dextrose 5%. 1000 milliLiter(s) (50 mL/Hr) IV Continuous <Continuous>  dextrose 50% Injectable 25 Gram(s) IV Push once  dextrose 50% Injectable 12.5 Gram(s) IV Push once  dextrose 50% Injectable 25 Gram(s) IV Push once  gabapentin 200 milliGRAM(s) Oral three times a day  glucagon  Injectable 1 milliGRAM(s) IntraMuscular once  insulin lispro (ADMELOG) corrective regimen sliding scale   SubCutaneous every 6 hours  metoprolol succinate  milliGRAM(s) Oral daily  piperacillin/tazobactam IVPB.. 3.375 Gram(s) IV Intermittent every 8 hours  sodium chloride 0.9%. 1000 milliLiter(s) (70 mL/Hr) IV Continuous <Continuous>  spironolactone 12.5 milliGRAM(s) Oral daily    MEDICATIONS  (PRN):  acetaminophen     Tablet .. 650 milliGRAM(s) Oral every 6 hours PRN Moderate Pain (4 - 6)  aluminum hydroxide/magnesium hydroxide/simethicone Suspension 30 milliLiter(s) Oral every 6 hours PRN Dyspepsia  dextrose Oral Gel 15 Gram(s) Oral once PRN Blood Glucose LESS THAN 70 milliGRAM(s)/deciliter  morphine  - Injectable 1 milliGRAM(s) IV Push every 6 hours PRN Severe Pain (7 - 10)  sodium chloride 0.9% lock flush 10 milliLiter(s) IV Push every 1 hour PRN Pre/post blood products, medications, blood draw, and to maintain line patency      LABS:                        13.2   15.27 )-----------( 262      ( 2023 06:51 )             42.0         139  |  100  |  26<H>  ----------------------------<  203<H>  4.1   |  26  |  1.62<H>    Ca    9.4      2023 06:58  Mg     1.9         TPro  6.8  /  Alb  3.6  /  TBili  0.7  /  DBili  x   /  AST  49<H>  /  ALT  49<H>  /  AlkPhos  99      PT/INR - ( 2023 06:52 )   PT: 15.7 sec;   INR: 1.35 ratio         PTT - ( 2023 06:52 )  PTT:30.7 sec  Urinalysis Basic - ( 2023 16:21 )    Color: Light Yellow / Appearance: Clear / S.025 / pH: x  Gluc: x / Ketone: Small  / Bili: Negative / Urobili: Negative   Blood: x / Protein: 30 mg/dL / Nitrite: Negative   Leuk Esterase: Negative / RBC: 6 /hpf / WBC 2 /HPF   Sq Epi: x / Non Sq Epi: 2 /hpf / Bacteria: Negative

## 2023-01-19 NOTE — CONSULT NOTE ADULT - SUBJECTIVE AND OBJECTIVE BOX
Patient is a 82y old  Female who presents with a chief complaint of acute cholecystitis, DKA (2023 12:29)      HPI:  Patient is a 80 year old F with a PMHx of HTN, HLD, T2DM, HFpEF (EF 65% ), breast cancer s/p R partial mastectomy, rectal carcinoma s/p resection, CAD s/p CABG (2020) who presents for 1 day of progressively worsening abdominal pain associated with nausea and NBNB vomiting. The patient's daughter reports that the patient had one self resolving episode of abdominal pain on 1/15, which they had initially attributed to gas and bloating. The pain returned again on the night prior to arrival and progressively worsened throughout the night. The patient denies any fever, chills, shortness of breath, chest pain, constipation, or diarrhea.    ED course: Vitals stable upon arrival (HR 78, /74, SpO2 95% on RA, afebrile). Labs were WBC 16.6 with L shift, AG 19, BUN 34, Cr 1.77, serum glucose of 450, BHB of 2, , pH of 7.38, lactate of 2.6. CT A/P with concern for acute cholecystitis with choledocholithiasis, but deemed not a surgical candidate per surgery. The patient was given 1x zosyn for cholecystitis, as well as 750cc of NS and 4U regular insulin for mild DKA. The patient also received pepcid, maalox, zofran, and morphine for abdominal pain and nausea.  (2023 15:03)      PAST MEDICAL & SURGICAL HISTORY:  Breast CA, right   s/p mastectomy ,IV Chemotherapy      HTN (hypertension)  Lymphedema of arm  right arm s/p mastectomy  Hyperlipidemia  Rectal cancer  s/p chemotherapy and  radiation 12 weeks 2015 -3/2015  Obese  Type II diabetes mellitus  CHF (congestive heart failure)  DVT of leg (deep venous thrombosis)  right calf DVT  2019 pt on Eliquis  Renal insufficiency      S/P mastectomy, right        History of appendectomy        S/P ileostomy  low anterior resection-8/10/15, reversal of ileostomy 2016      S/P colon resection  2015      S/P TKR (total knee replacement), right  2017          MEDICATIONS  (STANDING):  amLODIPine   Tablet 5 milliGRAM(s) Oral daily  aspirin  chewable 81 milliGRAM(s) Oral daily  atorvastatin 40 milliGRAM(s) Oral at bedtime  chlorhexidine 4% Liquid 1 Application(s) Topical <User Schedule>  dextrose 5%. 1000 milliLiter(s) (100 mL/Hr) IV Continuous <Continuous>  dextrose 5%. 1000 milliLiter(s) (50 mL/Hr) IV Continuous <Continuous>  dextrose 50% Injectable 25 Gram(s) IV Push once  dextrose 50% Injectable 12.5 Gram(s) IV Push once  dextrose 50% Injectable 25 Gram(s) IV Push once  gabapentin 200 milliGRAM(s) Oral three times a day  glucagon  Injectable 1 milliGRAM(s) IntraMuscular once  insulin glargine Injectable (LANTUS) 10 Unit(s) SubCutaneous at bedtime  insulin lispro (ADMELOG) corrective regimen sliding scale   SubCutaneous Before meals and at bedtime  metoprolol succinate  milliGRAM(s) Oral daily  piperacillin/tazobactam IVPB.. 3.375 Gram(s) IV Intermittent every 8 hours  sodium chloride 0.9%. 1000 milliLiter(s) (70 mL/Hr) IV Continuous <Continuous>  spironolactone 12.5 milliGRAM(s) Oral daily      Allergies    CT scan dye (Hives)  penicillin (Unknown)    Intolerances        SOCIAL HISTORY:  Denies ETOh,Smoking,     FAMILY HISTORY:  Family history of diabetes mellitus in mother    Family history of diabetes mellitus in son    Family history of heart attack (Child)        REVIEW OF SYSTEMS:  CONSTITUTIONAL: No weakness, fevers or chills  EYES/ENT: No visual changes;  No vertigo or throat pain   NECK: No pain or stiffness  RESPIRATORY: No cough, wheezing, hemoptysis; No shortness of breath  CARDIOVASCULAR: No chest pain or palpitations  GASTROINTESTINAL: No abdominal or epigastric pain. No nausea, vomiting, or hematemesis; No diarrhea or constipation. No melena or hematochezia.  GENITOURINARY: No dysuria, frequency or hematuria  NEUROLOGICAL: No numbness or weakness  SKIN: No itching, burning, rashes, or lesions   All other review of systems is negative unless indicated above.    VITAL:  T(C): , Max: 37.4 (23 @ 22:29)  T(F): , Max: 99.4 (23 @ 22:29)  HR: 98 (23 @ 11:26)  BP: 104/53 (23 @ 11:26)  BP(mean): --  RR: 17 (23 @ 11:26)  SpO2: 93% (23 @ 11:26)  Wt(kg): --    PHYSICAL EXAM:  Constitutional: NAD, Alert  HEENT: NCAT, MMM  Neck: Supple, No JVD  Respiratory: CTA-b/l  Cardiovascular: RRR s1s2, no m/r/g  Gastrointestinal: RUQ slight tenderness   Extremities: No peripheral edema b/l  Neurological: no focal deficits; strength grossly intact  Back: no CVAT b/l  Skin: No rashes, no nevi  Patient is a 80 year old F with a PMHx of HTN, HLD, T2DM, HFpEF (EF 65% ), breast cancer s/p R partial mastectomy, rectal carcinoma s/p resection, CAD s/p CABG (2020) who presents for 1 day of progressively worsening abdominal pain associated with nausea and NBNB vomiting  LABS:                        13.2   15.27 )-----------( 262      ( 2023 06:51 )             42.0     Na(139)/K(4.1)/Cl(100)/HCO3(26)/BUN(26)/Cr(1.62)Glu(203)/Ca(9.4)/Mg(--)/PO4(--)     @ 06:58  Na(136)/K(4.2)/Cl(98)/HCO3(24)/BUN(31)/Cr(1.58)Glu(323)/Ca(9.4)/Mg(--)/PO4(--)     @ 16:14  Na(135)/K(4.4)/Cl(94)/HCO3(22)/BUN(34)/Cr(1.77)Glu(458)/Ca(10.1)/Mg(1.9)/PO4(--)     @ 10:23    Urinalysis Basic - ( 2023 16:21 )    Color: Light Yellow / Appearance: Clear / S.025 / pH: x  Gluc: x / Ketone: Small  / Bili: Negative / Urobili: Negative   Blood: x / Protein: 30 mg/dL / Nitrite: Negative   Leuk Esterase: Negative / RBC: 6 /hpf / WBC 2 /HPF   Sq Epi: x / Non Sq Epi: 2 /hpf / Bacteria: Negative            IMAGING:    ASSESSMENT:  Patient is a 80 year old F with a PMHx of HTN, HLD, T2DM, HFpEF (EF 65% X/), breast cancer s/p R partial mastectomy, rectal carcinoma s/p resection, CAD s/p CABG (2020) who presents for 1 day of progressively worsening abdominal pain associated with nausea and NBNB vomiting. admitted with DKA and cholecystitis     CKD stage 3 ---  1.6-1.8 mg/dl  CKD due to HTN/DM creatinine     CVS----BP  soft   GI: abdominal pain , N/V--- acute cholecystitis     RECOMMENDATION:  1)Renal:  avoid nephrotoxic medication  2)CVS: euvolemic ,BP soft , NS 50 cc /h for 10 h .BP meds with parameter hold amlodipine if BP<100 mmhg   3)ID:on IV abx  4) surgery on board , conservative management     Thank you for involving [facility] in this patient's care.    With warm regards    Brenda Rader  [facility]  Office: [phone]                 Patient is a 82y old  Female who presents with a chief complaint of acute cholecystitis, DKA (2023 12:29)      HPI:  Patient is a 80 year old F with a PMHx of HTN, HLD, T2DM, HFpEF (EF 65% ), breast cancer s/p R partial mastectomy, rectal carcinoma s/p resection, CAD s/p CABG (2020) who presents for 1 day of progressively worsening abdominal pain associated with nausea and NBNB vomiting. The patient's daughter reports that the patient had one self resolving episode of abdominal pain on 1/15, which they had initially attributed to gas and bloating. The pain returned again on the night prior to arrival and progressively worsened throughout the night. The patient denies any fever, chills, shortness of breath, chest pain, constipation, or diarrhea.    ED course: Vitals stable upon arrival (HR 78, /74, SpO2 95% on RA, afebrile). Labs were WBC 16.6 with L shift, AG 19, BUN 34, Cr 1.77, serum glucose of 450, BHB of 2, , pH of 7.38, lactate of 2.6. CT A/P with concern for acute cholecystitis with choledocholithiasis, but deemed not a surgical candidate per surgery. The patient was given 1x zosyn for cholecystitis, as well as 750cc of NS and 4U regular insulin for mild DKA. The patient also received pepcid, maalox, zofran, and morphine for abdominal pain and nausea.  (2023 15:03)      PAST MEDICAL & SURGICAL HISTORY:  Breast CA, right   s/p mastectomy ,IV Chemotherapy      HTN (hypertension)  Lymphedema of arm  right arm s/p mastectomy  Hyperlipidemia  Rectal cancer  s/p chemotherapy and  radiation 12 weeks 2015 -3/2015  Obese  Type II diabetes mellitus  CHF (congestive heart failure)  DVT of leg (deep venous thrombosis)  right calf DVT  2019 pt on Eliquis  Renal insufficiency      S/P mastectomy, right        History of appendectomy        S/P ileostomy  low anterior resection-8/10/15, reversal of ileostomy 2016      S/P colon resection  2015      S/P TKR (total knee replacement), right  2017          MEDICATIONS  (STANDING):  amLODIPine   Tablet 5 milliGRAM(s) Oral daily  aspirin  chewable 81 milliGRAM(s) Oral daily  atorvastatin 40 milliGRAM(s) Oral at bedtime  chlorhexidine 4% Liquid 1 Application(s) Topical <User Schedule>  dextrose 5%. 1000 milliLiter(s) (100 mL/Hr) IV Continuous <Continuous>  dextrose 5%. 1000 milliLiter(s) (50 mL/Hr) IV Continuous <Continuous>  dextrose 50% Injectable 25 Gram(s) IV Push once  dextrose 50% Injectable 12.5 Gram(s) IV Push once  dextrose 50% Injectable 25 Gram(s) IV Push once  gabapentin 200 milliGRAM(s) Oral three times a day  glucagon  Injectable 1 milliGRAM(s) IntraMuscular once  insulin glargine Injectable (LANTUS) 10 Unit(s) SubCutaneous at bedtime  insulin lispro (ADMELOG) corrective regimen sliding scale   SubCutaneous Before meals and at bedtime  metoprolol succinate  milliGRAM(s) Oral daily  piperacillin/tazobactam IVPB.. 3.375 Gram(s) IV Intermittent every 8 hours  sodium chloride 0.9%. 1000 milliLiter(s) (70 mL/Hr) IV Continuous <Continuous>  spironolactone 12.5 milliGRAM(s) Oral daily      Allergies    CT scan dye (Hives)  penicillin (Unknown)    Intolerances        SOCIAL HISTORY:  Denies ETOh,Smoking,     FAMILY HISTORY:  Family history of diabetes mellitus in mother    Family history of diabetes mellitus in son    Family history of heart attack (Child)        REVIEW OF SYSTEMS:  CONSTITUTIONAL: No weakness, fevers or chills  EYES/ENT: No visual changes;  No vertigo or throat pain   NECK: No pain or stiffness  RESPIRATORY: No cough, wheezing, hemoptysis; No shortness of breath  CARDIOVASCULAR: No chest pain or palpitations  GASTROINTESTINAL: No abdominal or epigastric pain. No nausea, vomiting, or hematemesis; No diarrhea or constipation. No melena or hematochezia.  GENITOURINARY: No dysuria, frequency or hematuria  NEUROLOGICAL: No numbness or weakness  SKIN: No itching, burning, rashes, or lesions   All other review of systems is negative unless indicated above.    VITAL:  T(C): , Max: 37.4 (23 @ 22:29)  T(F): , Max: 99.4 (23 @ 22:29)  HR: 98 (23 @ 11:26)  BP: 104/53 (23 @ 11:26)  BP(mean): --  RR: 17 (23 @ 11:26)  SpO2: 93% (23 @ 11:26)  Wt(kg): --    PHYSICAL EXAM:  Constitutional: NAD, Alert  HEENT: NCAT, MMM  Neck: Supple, No JVD  Respiratory: CTA-b/l  Cardiovascular: RRR s1s2, no m/r/g  Gastrointestinal: RUQ slight tenderness   Extremities: No peripheral edema b/l  Neurological: no focal deficits; strength grossly intact  Back: no CVAT b/l  Skin: No rashes, no nevi  Patient is a 80 year old F with a PMHx of HTN, HLD, T2DM, HFpEF (EF 65% ), breast cancer s/p R partial mastectomy, rectal carcinoma s/p resection, CAD s/p CABG (2020) who presents for 1 day of progressively worsening abdominal pain associated with nausea and NBNB vomiting  LABS:                        13.2   15.27 )-----------( 262      ( 2023 06:51 )             42.0     Na(139)/K(4.1)/Cl(100)/HCO3(26)/BUN(26)/Cr(1.62)Glu(203)/Ca(9.4)/Mg(--)/PO4(--)     @ 06:58  Na(136)/K(4.2)/Cl(98)/HCO3(24)/BUN(31)/Cr(1.58)Glu(323)/Ca(9.4)/Mg(--)/PO4(--)     @ 16:14  Na(135)/K(4.4)/Cl(94)/HCO3(22)/BUN(34)/Cr(1.77)Glu(458)/Ca(10.1)/Mg(1.9)/PO4(--)     @ 10:23    Urinalysis Basic - ( 2023 16:21 )    Color: Light Yellow / Appearance: Clear / S.025 / pH: x  Gluc: x / Ketone: Small  / Bili: Negative / Urobili: Negative   Blood: x / Protein: 30 mg/dL / Nitrite: Negative   Leuk Esterase: Negative / RBC: 6 /hpf / WBC 2 /HPF   Sq Epi: x / Non Sq Epi: 2 /hpf / Bacteria: Negative            IMAGING:    ASSESSMENT:  Patient is a 80 year old F with a PMHx of HTN, HLD, T2DM, HFpEF (EF 65% X/), breast cancer s/p R partial mastectomy, rectal carcinoma s/p resection, CAD s/p CABG (2020) who presents for 1 day of progressively worsening abdominal pain associated with nausea and NBNB vomiting. admitted with DKA and cholecystitis     CKD stage 3 ---  1.6-1.8 mg/dl  CKD due to HTN/DM creatinine     CVS----BP  soft   GI: abdominal pain , N/V--- acute cholecystitis     RECOMMENDATION:  1)Renal:  avoid nephrotoxic medication  2)CVS: euvolemic ,BP soft , .BP meds with parameter on  amlodipine,, spironolactone and metoprolol was held   a-w gentle hydration  3)ID:on IV abx  4) surgery on board , conservative management   IR on board perc  likely tomorrow (cholecystostomy drainage)    Thank you for involving [facility] in this patient's care.    With warm regards      d-w nurse  Brenda Rader  [facility]  Office: [phone]                 Patient is a 82y old  Female who presents with a chief complaint of acute cholecystitis, DKA (2023 12:29)      HPI:  Patient is a 80 year old F with a PMHx of HTN, HLD, T2DM, HFpEF (EF 65% ), breast cancer s/p R partial mastectomy, rectal carcinoma s/p resection, CAD s/p CABG (2020) who presents for 1 day of progressively worsening abdominal pain associated with nausea and NBNB vomiting. The patient's daughter reports that the patient had one self resolving episode of abdominal pain on 1/15, which they had initially attributed to gas and bloating. The pain returned again on the night prior to arrival and progressively worsened throughout the night. The patient denies any fever, chills, shortness of breath, chest pain, constipation, or diarrhea.    ED course: Vitals stable upon arrival (HR 78, /74, SpO2 95% on RA, afebrile). Labs were WBC 16.6 with L shift, AG 19, BUN 34, Cr 1.77, serum glucose of 450, BHB of 2, , pH of 7.38, lactate of 2.6. CT A/P with concern for acute cholecystitis with choledocholithiasis, but deemed not a surgical candidate per surgery. The patient was given 1x zosyn for cholecystitis, as well as 750cc of NS and 4U regular insulin for mild DKA. The patient also received pepcid, maalox, zofran, and morphine for abdominal pain and nausea.  (2023 15:03)      PAST MEDICAL & SURGICAL HISTORY:  Breast CA, right   s/p mastectomy ,IV Chemotherapy      HTN (hypertension)  Lymphedema of arm  right arm s/p mastectomy  Hyperlipidemia  Rectal cancer  s/p chemotherapy and  radiation 12 weeks 2015 -3/2015  Obese  Type II diabetes mellitus  CHF (congestive heart failure)  DVT of leg (deep venous thrombosis)  right calf DVT  2019 pt on Eliquis  Renal insufficiency      S/P mastectomy, right        History of appendectomy        S/P ileostomy  low anterior resection-8/10/15, reversal of ileostomy 2016      S/P colon resection  2015      S/P TKR (total knee replacement), right  2017          MEDICATIONS  (STANDING):  amLODIPine   Tablet 5 milliGRAM(s) Oral daily  aspirin  chewable 81 milliGRAM(s) Oral daily  atorvastatin 40 milliGRAM(s) Oral at bedtime  chlorhexidine 4% Liquid 1 Application(s) Topical <User Schedule>  dextrose 5%. 1000 milliLiter(s) (100 mL/Hr) IV Continuous <Continuous>  dextrose 5%. 1000 milliLiter(s) (50 mL/Hr) IV Continuous <Continuous>  dextrose 50% Injectable 25 Gram(s) IV Push once  dextrose 50% Injectable 12.5 Gram(s) IV Push once  dextrose 50% Injectable 25 Gram(s) IV Push once  gabapentin 200 milliGRAM(s) Oral three times a day  glucagon  Injectable 1 milliGRAM(s) IntraMuscular once  insulin glargine Injectable (LANTUS) 10 Unit(s) SubCutaneous at bedtime  insulin lispro (ADMELOG) corrective regimen sliding scale   SubCutaneous Before meals and at bedtime  metoprolol succinate  milliGRAM(s) Oral daily  piperacillin/tazobactam IVPB.. 3.375 Gram(s) IV Intermittent every 8 hours  sodium chloride 0.9%. 1000 milliLiter(s) (70 mL/Hr) IV Continuous <Continuous>  spironolactone 12.5 milliGRAM(s) Oral daily      Allergies    CT scan dye (Hives)  penicillin (Unknown)    Intolerances        SOCIAL HISTORY:  Denies ETOh,Smoking,     FAMILY HISTORY:  Family history of diabetes mellitus in mother    Family history of diabetes mellitus in son    Family history of heart attack (Child)        REVIEW OF SYSTEMS:  CONSTITUTIONAL: No weakness, fevers or chills  EYES/ENT: No visual changes;  No vertigo or throat pain   NECK: No pain or stiffness  RESPIRATORY: No cough, wheezing, hemoptysis; No shortness of breath  CARDIOVASCULAR: No chest pain or palpitations  GASTROINTESTINAL: No abdominal or epigastric pain. No nausea, vomiting, or hematemesis; No diarrhea or constipation. No melena or hematochezia.  GENITOURINARY: No dysuria, frequency or hematuria  NEUROLOGICAL: No numbness or weakness  SKIN: No itching, burning, rashes, or lesions   All other review of systems is negative unless indicated above.    VITAL:  T(C): , Max: 37.4 (23 @ 22:29)  T(F): , Max: 99.4 (23 @ 22:29)  HR: 98 (23 @ 11:26)  BP: 104/53 (23 @ 11:26)  BP(mean): --  RR: 17 (23 @ 11:26)  SpO2: 93% (23 @ 11:26)  Wt(kg): --    PHYSICAL EXAM:  Constitutional: NAD, Alert  HEENT: NCAT, MMM  Neck: Supple, No JVD  Respiratory: CTA-b/l  Cardiovascular: RRR s1s2, no m/r/g  Gastrointestinal: RUQ slight tenderness   Extremities: No peripheral edema b/l  Neurological: no focal deficits; strength grossly intact  Back: no CVAT b/l  Skin: No rashes, no nevi  Patient is a 80 year old F with a PMHx of HTN, HLD, T2DM, HFpEF (EF 65% ), breast cancer s/p R partial mastectomy, rectal carcinoma s/p resection, CAD s/p CABG (2020) who presents for 1 day of progressively worsening abdominal pain associated with nausea and NBNB vomiting  LABS:                        13.2   15.27 )-----------( 262      ( 2023 06:51 )             42.0     Na(139)/K(4.1)/Cl(100)/HCO3(26)/BUN(26)/Cr(1.62)Glu(203)/Ca(9.4)/Mg(--)/PO4(--)     @ 06:58  Na(136)/K(4.2)/Cl(98)/HCO3(24)/BUN(31)/Cr(1.58)Glu(323)/Ca(9.4)/Mg(--)/PO4(--)     @ 16:14  Na(135)/K(4.4)/Cl(94)/HCO3(22)/BUN(34)/Cr(1.77)Glu(458)/Ca(10.1)/Mg(1.9)/PO4(--)     @ 10:23    Urinalysis Basic - ( 2023 16:21 )    Color: Light Yellow / Appearance: Clear / S.025 / pH: x  Gluc: x / Ketone: Small  / Bili: Negative / Urobili: Negative   Blood: x / Protein: 30 mg/dL / Nitrite: Negative   Leuk Esterase: Negative / RBC: 6 /hpf / WBC 2 /HPF   Sq Epi: x / Non Sq Epi: 2 /hpf / Bacteria: Negative            IMAGING:    ASSESSMENT:  Patient is a 80 year old F with a PMHx of HTN, HLD, T2DM, HFpEF (EF 65% X/), breast cancer s/p R partial mastectomy, rectal carcinoma s/p resection, CAD s/p CABG (2020) who presents for 1 day of progressively worsening abdominal pain associated with nausea and NBNB vomiting. admitted with DKA and cholecystitis     CKD stage 3 ---  1.6-1.8 mg/dl  CKD single functional kidney  low nephron mass   CVS----BP  soft   GI: abdominal pain , N/V--- acute cholecystitis     RECOMMENDATION:  1)Renal:  avoid nephrotoxic medication  2)CVS: euvolemic ,BP soft , .BP meds with parameter on  amlodipine,, spironolactone and metoprolol was held   a-w gentle hydration  3)ID:on IV abx  4) surgery on board , conservative management   IR on board perc  likely tomorrow (cholecystostomy drainage)    Thank you for involving [facility] in this patient's care.    With warm regards      d-w nurse  Brenda Rader  [facility]  Office: [phone]

## 2023-01-19 NOTE — PROGRESS NOTE ADULT - SUBJECTIVE AND OBJECTIVE BOX
Interval Events:   - Afebrile, reports RUQ pain    Allergies:  CT scan dye (Hives)  penicillin (Unknown)        Hospital Medications:  acetaminophen     Tablet .. 650 milliGRAM(s) Oral every 6 hours PRN  aluminum hydroxide/magnesium hydroxide/simethicone Suspension 30 milliLiter(s) Oral every 6 hours PRN  amLODIPine   Tablet 5 milliGRAM(s) Oral daily  aspirin  chewable 81 milliGRAM(s) Oral daily  atorvastatin 40 milliGRAM(s) Oral at bedtime  chlorhexidine 4% Liquid 1 Application(s) Topical <User Schedule>  dextrose 5%. 1000 milliLiter(s) IV Continuous <Continuous>  dextrose 5%. 1000 milliLiter(s) IV Continuous <Continuous>  dextrose 50% Injectable 25 Gram(s) IV Push once  dextrose 50% Injectable 12.5 Gram(s) IV Push once  dextrose 50% Injectable 25 Gram(s) IV Push once  dextrose Oral Gel 15 Gram(s) Oral once PRN  gabapentin 200 milliGRAM(s) Oral three times a day  glucagon  Injectable 1 milliGRAM(s) IntraMuscular once  insulin lispro (ADMELOG) corrective regimen sliding scale   SubCutaneous every 6 hours  metoprolol succinate  milliGRAM(s) Oral daily  morphine  - Injectable 1 milliGRAM(s) IV Push every 6 hours PRN  piperacillin/tazobactam IVPB.. 3.375 Gram(s) IV Intermittent every 8 hours  sodium chloride 0.9% lock flush 10 milliLiter(s) IV Push every 1 hour PRN  sodium chloride 0.9%. 1000 milliLiter(s) IV Continuous <Continuous>  spironolactone 12.5 milliGRAM(s) Oral daily      PMHX/PSHX:  DM (diabetes mellitus)    HTN (hypertension)    Hyperlipidemia    Obesity (BMI 35.0-39.9 without comorbidity)    Breast CA, right    Colon cancer    Lymphedema of arm    Cancer    Rectal cancer    Breast CA, right    HTN (hypertension)    Diabetes mellitus    Lymphedema of arm    Hyperlipidemia    Rectal cancer    S/P chemotherapy, time since greater than 12 weeks    S/P radiation &gt; 12 weeks    Obese    Type II diabetes mellitus    CHF (congestive heart failure)    DVT of leg (deep venous thrombosis)    Elevated blood pressure reading in office with diagnosis of hypertension    Renal insufficiency    H/O mastectomy, right    History of tonsillectomy    S/P colectomy    S/P mastectomy, right    History of appendectomy    S/P ileostomy    S/P colon resection    S/P TKR (total knee replacement), right        Family history:  No pertinent family history in first degree relatives    No pertinent family history in first degree relatives    Family history of diabetes mellitus in mother    Family history of diabetes mellitus in son    Family history of heart attack (Child)        ROS: As per HPI, otherwise 14-point ROS reviewed and negative.      PHYSICAL EXAM:   Vital Signs:  Vital Signs Last 24 Hrs  T(C): 37.1 (2023 08:15), Max: 37.4 (2023 22:29)  T(F): 98.7 (2023 08:15), Max: 99.4 (2023 22:29)  HR: 97 (2023 08:15) (86 - 98)  BP: 145/80 (2023 08:15) (110/69 - 145/80)  BP(mean): --  RR: 17 (2023 08:15) (16 - 18)  SpO2: 93% (2023 08:15) (93% - 98%)    Parameters below as of 2023 08:15  Patient On (Oxygen Delivery Method): room air      Daily     Daily       GENERAL:  no distress  HEENT:  NC/AT,  conjunctivae clear and pink,  no JVD  CHEST:  Full & symmetric excursion, no increased effort, breath sounds clear  HEART:  Regular rhythm, S1, S2, no murmur/rub/S3/S4, no abdominal bruit, no edema  ABDOMEN:  Soft, tender to palpation along entire right abdomen and epigastric area, no guarding, multiple abdominal healed scars  EXTREMITIES:  no cyanosis,clubbing or edema  SKIN:  No rash/erythema/ecchymoses/petechiae/wounds/abscess/warm/dry  NEURO:  Alert, oriented        LABS:                        13.2   15.27 )-----------( 262      ( 2023 06:51 )             42.0     Mean Cell Volume: 94.8 fl (23 @ 06:51)        139  |  100  |  26<H>  ----------------------------<  203<H>  4.1   |  26  |  1.62<H>    Ca    9.4      2023 06:58  Mg     1.9         TPro  6.8  /  Alb  3.6  /  TBili  0.7  /  DBili  x   /  AST  49<H>  /  ALT  49<H>  /  AlkPhos  99  -    LIVER FUNCTIONS - ( 2023 06:58 )  Alb: 3.6 g/dL / Pro: 6.8 g/dL / ALK PHOS: 99 U/L / ALT: 49 U/L / AST: 49 U/L / GGT: x           PT/INR - ( 2023 06:52 )   PT: 15.7 sec;   INR: 1.35 ratio         PTT - ( 2023 06:52 )  PTT:30.7 sec  Urinalysis Basic - ( 2023 16:21 )    Color: Light Yellow / Appearance: Clear / S.025 / pH: x  Gluc: x / Ketone: Small  / Bili: Negative / Urobili: Negative   Blood: x / Protein: 30 mg/dL / Nitrite: Negative   Leuk Esterase: Negative / RBC: 6 /hpf / WBC 2 /HPF   Sq Epi: x / Non Sq Epi: 2 /hpf / Bacteria: Negative      Amylase Serum25      Lipase serum17       Ammonia--  Amylase Serum--      Lipase serum20       Ammonia--                          13.2   15.27 )-----------( 262      ( 2023 06:51 )             42.0                         13.6   16.66 )-----------( 295      ( 2023 10:23 )             42.6       Imaging:

## 2023-01-19 NOTE — PROGRESS NOTE ADULT - ATTENDING COMMENTS
date of service 1/19/23    pt seen and examined  pt chart and imaging reviewed in detail  agree with note above  82F with RUQ pain c/s acute cholecystitis and choledocolithiasis with significant co-morbidities including CAD, s/p CABG, CHF, poorly controlled DM  pt is poor operative candidate  resting in bed, NAD  anicteric  softly distended, +RUQ TTP no r/g  cont npo, ivf , iv abx  serial abd exams  f/u labs in am  f/u IR evaluation for possible perc cholecystostomy drainage  f/u GI for possible EUS/ERCP  f/u cards for risk stratification  f/u endo for better FS control  cont supportive care per med  will follow with you  d/w pt & family / RN @ bedside in detail .

## 2023-01-19 NOTE — PROGRESS NOTE ADULT - PROBLEM SELECTOR PLAN 3
Patient with HFpEF, follows with Dr. Hagan   - recent admission 8/2022 for ADHF and fluid overload  -  upon dc, 602 upon admission  - TTE 8/2022: EF 62%, normal LV/RV systolic function, elevated LV filling pressures, mild MR    Plan:  > continue with home meds: spironolactone 12.5 qd, metoprolol 150 qd  > hold bumex given likely hypovolemic status iso volume loss with vomiting

## 2023-01-19 NOTE — PROGRESS NOTE ADULT - ASSESSMENT
1 year old F with a PMHx of HTN, HLD, T2DM, HFpEF (EF 65%), breast cancer s/p R partial mastectomy, rectal carcinoma s/p resection, CAD s/p CABG (11/2020) who presents for 1 week of progressively worsening intermittent abdominal pain as well as N/V - found to have acute rissa, choledocho and DKA.    Impression:  #Choledocholithiasis - as seen on CT with CBD dilation. Noted normal bilirubin. Low suspicion for cholangitis at this point. Plan for ERCP when optimized for stone extraction.   #Acute rissa - deemed not a surgical candidate  #DKA, elevated lactate - elevated BHB, hyperglycemia, HAGMA   #HFpEF  #DVT on eliquis   # CKD    Recommendation:  - patient needs ERCP for choledocho however hemodynamically stable and currently not optimized for endoscopic procedure with general anesthesia given DKA and recent eliquis administration (to allow for sphincterotomy)  - Cardiology consult given recent admission for ADHF for clearance for endoscopic procedure  - Tentatively plan for tomorrow pending above  - hold eliquis  - if decompensation, can plan for more urgent ERCP without sphincterotomy and stent placement  - Keep NPO  - glucose control per primary team  - c/w abx  - IVF as able however with CKD and HFpEF  - trend CBC, CMP, coags    Please note that the recommendations are not final until attested by an attending.    Thank you for involving us in the care of this patient. Please reach out if any further questions.    Sam Melton, PGY-6  Gastroenterology/Hepatology Fellow    Available on [employer] Teams  After 5PM/Weekends, please contact the on-call GI fellow: [phone]

## 2023-01-19 NOTE — PHYSICAL THERAPY INITIAL EVALUATION ADULT - PERTINENT HX OF CURRENT PROBLEM, REHAB EVAL
Pt is a 83 y/o female admitted to [facility] on 1/18/23 PMHx of HTN, HLD, T2DM, HFpEF (EF 65% X/2022), breast cancer s/p R partial mastectomy, rectal carcinoma s/p resection, CAD s/p CABG (11/2020) who presents for 1 day of progressively worsening abdominal pain associated with nausea and NBNB vomiting. The patient's daughter reports that the patient had one self resolving episode of abdominal pain on 1/15, which they had initially attributed to gas and bloating. The pain returned again on the night prior to arrival and progressively worsened throughout the night.   CT A/P with concern for acute cholecystitis with choledocholithiasis, but deemed not a surgical candidate per surgery. The patient was given 1x zosyn for cholecystitis, as well as 750cc of NS and 4U regular insulin for mild DKA.  US Abdomen: Diffuse hepatic steatosis. Gallbladder stones, wall thickening, and sonographic Zafar sign.

## 2023-01-19 NOTE — CHART NOTE - NSCHARTNOTEFT_GEN_A_CORE
- Per GI, ERCP scheduled for 1/20/23.  - Per chart review, pt's abdominal pain has resolved. WBC count downtrending (16.6 --> 15.3). Pt appears to be doing well on antibiotics.  - Will re-assess for percutaneous cholecystostomy after ERCP done by GI    Assessment: 82y Female     Pawan Rodriguez MD  PGY-2, Radiology    -Available on [employer] TEAMS for all non-urgent questions  -Emergent issues: [facility]-p.[phone]; [facility]-p.[phone] ([phone])  -Non-emergent consults: Please place a [place] order "Consult-Interventional Radiology" with an appropriate callback number  -Scheduling questions: [facility]: [phone]; [facility]: [phone]  -Clinic/Outpatient booking: [facility]: [phone]; [facility]: [phone]

## 2023-01-19 NOTE — PHYSICAL THERAPY INITIAL EVALUATION ADULT - ADDITIONAL COMMENTS
Pt lives alone in a private house with ramp access and was (I) with all ADLs and amb with rollator. Pt lives alone in a private house with ramp access and was (I) with all ADLs and amb with rollator. Per   daughter, pt owns transport chair at home.

## 2023-01-19 NOTE — PROGRESS NOTE ADULT - ASSESSMENT
Assessment: 82 year old woman with acute cholecystitis and multiple medical comorbidities presents with abdominal pain.    Recs:  - No surgical intervention at this time given poor operative candidate   - GI consult for CBD 1.2cm  - Recommend IR to eval for perc rissa   - Continue IV abx     [facility]   [id number]

## 2023-01-19 NOTE — PHYSICAL THERAPY INITIAL EVALUATION ADULT - NSPTDISCHREC_GEN_A_CORE
TBD pending hospital course Sub Acute Rehab. If pt. to d/c home, would recommend home with PT and assist for all functional mobility. DME- DME-  pt will require 3:1 commode as patient confined to a single level/single room w/o a bathroom and pt maintains decrease standing tolerance./Sub-acute Rehab

## 2023-01-19 NOTE — PHYSICAL THERAPY INITIAL EVALUATION ADULT - PATIENT/FAMILY AGREES WITH PLAN
yes Sub Acute Rehab. If pt. to d/c home, would recommend home with PT and assist for all functional mobility. DME- DME-  pt will require 3:1 commode as patient confined to a single level/single room w/o a bathroom and pt maintains decrease standing tolerance./yes

## 2023-01-19 NOTE — PROGRESS NOTE ADULT - PROBLEM SELECTOR PLAN 1
Patient presenting with 1 week of abdominal pain, nausea, and vomiting  - CT A/P: acute cholecystitis with 1.2 cm common bile duct dilatation  - per surgery, not a surgical candidate given comorbidities     Plan:  > IR consulted for percutaneous cholecystostomy:  > GI consulted for choledocholithiasis: will need ERCP, f/u cards consult in AM for pre-op optimization, NPO after midnight  > continue with zosyn  > judicious IV hydration NS 70 cc/hr x 10 hr  > pain/nausea control with tylenol, morphine, maalox  > AVOID zofran given QTc 518  > trend CMP, coags Patient presenting with 1 week of abdominal pain, nausea, and vomiting  - CT A/P: acute cholecystitis with 1.2 cm common bile duct dilatation  - per surgery, not a surgical candidate given comorbidities     Plan:  > GI consulted for choledocholithiasis: plan for ERCP tomorrow, cardiac pre-op risk stratification obtained, NPO after midnight  > continue with zosyn  > CLD today, NPO after midnight  > pain/nausea control with tylenol, morphine, maalox  > AVOID zofran given QTc 518  > trend CMP, coags, mild transaminitis otherwise stable

## 2023-01-19 NOTE — PROGRESS NOTE ADULT - PROBLEM SELECTOR PLAN 2
Patient presenting with mild DKA given BHB of 2 and serum glucose of 450  - adequate response to 4U regular insulin  - s/p 750 cc IV hydration, judicious hydration given HFpEF  - patient on oral agents: repaglinide and semaglutide  - upon last admission 8/2022, patient was well controlled on lantus 16U QHS and admelog 7U TID  - A1c 8/2022: 9.6  - resolved, or likely was due to starvation ketosis given AG closed, pH normal, HCO3 normal, glucose improving    Plan:  > trend BHB, pH, serum HCO3; improving s/p 4U regular insulin  > continue with lantus 10U   > moderate dose correctional sliding scale q6h while NPO Patient presenting with mild DKA given BHB of 2 and serum glucose of 450  - resolved, or likely was due to starvation ketosis given AG closed, pH normal, HCO3 normal, glucose improving  - adequate response to 4U regular insulin  - s/p 750 cc IV hydration, judicious hydration given HFpEF  - patient on oral agents: repaglinide and semaglutide  - upon last admission 8/2022, patient was well controlled on lantus 16U QHS and admelog 7U TID  - A1c stable at 9.6    Plan:  > continue with lantus 10U QHS  > moderate dose correctional sliding scale q6h while NPO, with meals while eating

## 2023-01-19 NOTE — PROGRESS NOTE ADULT - ATTENDING COMMENTS
This is a 82F with h/o HTN, T2DM, HFpEF (EF 65%, normal LV in 2022), breast cancer s/p R partial mastectomy, rectal carcinoma s/p resection, CAD s/p CABG who presents for 1 week of intermittent abdominal pain associated with nausea and vomiting, Denies chest pain, SOB, fever.    In ED her VSS, CT abd/pelvis showed acute cholecystitis with choledocholithiasis, pericholecystic fluid, CBD 1.2cm. Surgery evaluated and recommended perc drain by IR and medical management. WBC 16, normal LFTs, A-Gap 19 with elevated FSBS.    1. Acute cholecystitis with choledocholithiasis   2. Uncontrolled T2DM with hyperglycemia  3. TAO on CKD 3, pre-renal  4. Chronic hydronephrosis  5. HTN  6. h/o CABG     ID [id number]  - Less RUQ abdominal pain, Tmax 99.4F, WBC 15, no sepsis, VSS  - GI, IR, and surgery plans noted. NPO p MN for ERCP by GI tomorrow for with choledocholithiasis  - IR will schedule her for Perc drain after given acute cholecystitis.  - c/w IV Zosyn, IV hydration, analgesics, NPO p MN  - c/w current insulin adj  - c/w home BP meds including BB, appreciated cardiology clearance  - Chronic hydronephrosis noted on US, Scr baseline at 1.5, s/p IV hydration  - Hold AC  - medically stable, no chest pain, No EKG changes of ischemia, Echo- EF 65%, normal LV function in 2022. No contraindication for GI or IR procedures  ** spoke to daughter at bedside.

## 2023-01-19 NOTE — PROGRESS NOTE ADULT - SUBJECTIVE AND OBJECTIVE BOX
PROGRESS NOTE:     Patient is a 82y old  Female who presents with a chief complaint of acute cholecystitis, DKA (2023 15:57)      ---------------------------------------------------  Dmitry Dangelo  PGY-1, Internal Medicine  Available on [employer] Teams  Pager: [phone] ([facility]), or [phone] ([place])  ---------------------------------------------------    SUBJECTIVE / OVERNIGHT EVENTS:    No acute events overnight. Patient examined at bedside with no acute complaints.     Pain:  Bowel Movements:  Urination:  OOB:  PT:    REVIEW OF SYSTEMS:    CONSTITUTIONAL: No weakness, fevers or chills  EYES/ENT: No visual changes;  No vertigo or throat pain   NECK: No pain or stiffness  RESPIRATORY: No cough, wheezing, hemoptysis; No shortness of breath  CARDIOVASCULAR: No chest pain or palpitations  GASTROINTESTINAL: No abdominal or epigastric pain. No nausea, vomiting, or hematemesis; No diarrhea or constipation. No melena or hematochezia.  GENITOURINARY: No dysuria, frequency or hematuria  NEUROLOGICAL: No numbness or weakness  SKIN: No itching, rashes      MEDICATIONS  (STANDING):  amLODIPine   Tablet 5 milliGRAM(s) Oral daily  aspirin  chewable 81 milliGRAM(s) Oral daily  atorvastatin 40 milliGRAM(s) Oral at bedtime  chlorhexidine 4% Liquid 1 Application(s) Topical <User Schedule>  dextrose 5%. 1000 milliLiter(s) (100 mL/Hr) IV Continuous <Continuous>  dextrose 5%. 1000 milliLiter(s) (50 mL/Hr) IV Continuous <Continuous>  dextrose 50% Injectable 25 Gram(s) IV Push once  dextrose 50% Injectable 12.5 Gram(s) IV Push once  dextrose 50% Injectable 25 Gram(s) IV Push once  gabapentin 200 milliGRAM(s) Oral three times a day  glucagon  Injectable 1 milliGRAM(s) IntraMuscular once  insulin lispro (ADMELOG) corrective regimen sliding scale   SubCutaneous every 6 hours  metoprolol succinate  milliGRAM(s) Oral daily  piperacillin/tazobactam IVPB.. 3.375 Gram(s) IV Intermittent every 8 hours  sodium chloride 0.9%. 1000 milliLiter(s) (70 mL/Hr) IV Continuous <Continuous>  spironolactone 12.5 milliGRAM(s) Oral daily    MEDICATIONS  (PRN):  acetaminophen     Tablet .. 650 milliGRAM(s) Oral every 6 hours PRN Moderate Pain (4 - 6)  aluminum hydroxide/magnesium hydroxide/simethicone Suspension 30 milliLiter(s) Oral every 6 hours PRN Dyspepsia  dextrose Oral Gel 15 Gram(s) Oral once PRN Blood Glucose LESS THAN 70 milliGRAM(s)/deciliter  morphine  - Injectable 1 milliGRAM(s) IV Push every 6 hours PRN Severe Pain (7 - 10)  sodium chloride 0.9% lock flush 10 milliLiter(s) IV Push every 1 hour PRN Pre/post blood products, medications, blood draw, and to maintain line patency      CAPILLARY BLOOD GLUCOSE      POCT Blood Glucose.: 172 mg/dL (2023 05:20)  POCT Blood Glucose.: 200 mg/dL (2023 00:07)  POCT Blood Glucose.: 199 mg/dL (2023 19:31)  POCT Blood Glucose.: 231 mg/dL (2023 17:51)  POCT Blood Glucose.: 365 mg/dL (2023 13:30)    I&O's Summary      VITALS:   T(C): 37.2 (23 @ 04:41), Max: 37.4 (23 @ 22:29)  HR: 95 (23 @ 04:41) (78 - 98)  BP: 139/64 (23 @ 04:41) (110/69 - 152/74)  RR: 18 (23 @ 04:41) (16 - 20)  SpO2: 93% (23 @ 04:41) (93% - 98%)    GENERAL: NAD, lying in bed comfortably  HEAD:  Atraumatic, normocephalic  EYES: EOMI, PERRLA, conjunctiva and sclera clear  ENT: Moist mucous membranes  NECK: Supple, no JVD  HEART: Regular rate and rhythm, no murmurs, rubs, or gallops  LUNGS: Unlabored respirations.  Clear to auscultation bilaterally, no crackles, wheezing, or rhonchi  ABDOMEN: Soft, nontender, nondistended, +BS  EXTREMITIES: 2+ peripheral pulses bilaterally. No clubbing, cyanosis, or edema  NERVOUS SYSTEM:  A&Ox3, no focal deficits   SKIN: No rashes or lesions    LABS:                        13.2   15.27 )-----------( 262      ( 2023 06:51 )             42.0         136  |  98  |  31<H>  ----------------------------<  323<H>  4.2   |  24  |  1.58<H>    Ca    9.4      2023 16:14  Mg     1.9         TPro  7.6  /  Alb  4.2  /  TBili  0.5  /  DBili  x   /  AST  15  /  ALT  16  /  AlkPhos  99            Urinalysis Basic - ( 2023 16:21 )    Color: Light Yellow / Appearance: Clear / S.025 / pH: x  Gluc: x / Ketone: Small  / Bili: Negative / Urobili: Negative   Blood: x / Protein: 30 mg/dL / Nitrite: Negative   Leuk Esterase: Negative / RBC: 6 /hpf / WBC 2 /HPF   Sq Epi: x / Non Sq Epi: 2 /hpf / Bacteria: Negative          RADIOLOGY & ADDITIONAL TESTS:  Results Reviewed:   Imaging Personally Reviewed:  Electrocardiogram Personally Reviewed:    COORDINATION OF CARE:  Care Discussed with Consultants/Other Providers [Y/N]:  Prior or Outpatient Records Reviewed [Y/N]:     PROGRESS NOTE:     Patient is a 82y old  Female who presents with a chief complaint of acute cholecystitis, DKA (2023 15:57)      ---------------------------------------------------  Dmitry Dangelo  PGY-1, Internal Medicine  Available on [employer] Teams  Pager: [phone] ([facility]), or [phone] ([place])  ---------------------------------------------------    SUBJECTIVE / OVERNIGHT EVENTS:    No acute events overnight. Patient examined at bedside with no acute complaints. Patient assessed with the assistance of Korean  Jasiel (ID [id number]), reports that she feels well adn has no acute complains. Reports improvement in her abdominal pain and has not had any further nausea or vomiting. Patient expressed understanding of plan for ERCP pending cardiac risk stratification and pre-operative optimization.       MEDICATIONS  (STANDING):  amLODIPine   Tablet 5 milliGRAM(s) Oral daily  aspirin  chewable 81 milliGRAM(s) Oral daily  atorvastatin 40 milliGRAM(s) Oral at bedtime  chlorhexidine 4% Liquid 1 Application(s) Topical <User Schedule>  dextrose 5%. 1000 milliLiter(s) (100 mL/Hr) IV Continuous <Continuous>  dextrose 5%. 1000 milliLiter(s) (50 mL/Hr) IV Continuous <Continuous>  dextrose 50% Injectable 25 Gram(s) IV Push once  dextrose 50% Injectable 12.5 Gram(s) IV Push once  dextrose 50% Injectable 25 Gram(s) IV Push once  gabapentin 200 milliGRAM(s) Oral three times a day  glucagon  Injectable 1 milliGRAM(s) IntraMuscular once  insulin lispro (ADMELOG) corrective regimen sliding scale   SubCutaneous every 6 hours  metoprolol succinate  milliGRAM(s) Oral daily  piperacillin/tazobactam IVPB.. 3.375 Gram(s) IV Intermittent every 8 hours  sodium chloride 0.9%. 1000 milliLiter(s) (70 mL/Hr) IV Continuous <Continuous>  spironolactone 12.5 milliGRAM(s) Oral daily    MEDICATIONS  (PRN):  acetaminophen     Tablet .. 650 milliGRAM(s) Oral every 6 hours PRN Moderate Pain (4 - 6)  aluminum hydroxide/magnesium hydroxide/simethicone Suspension 30 milliLiter(s) Oral every 6 hours PRN Dyspepsia  dextrose Oral Gel 15 Gram(s) Oral once PRN Blood Glucose LESS THAN 70 milliGRAM(s)/deciliter  morphine  - Injectable 1 milliGRAM(s) IV Push every 6 hours PRN Severe Pain (7 - 10)  sodium chloride 0.9% lock flush 10 milliLiter(s) IV Push every 1 hour PRN Pre/post blood products, medications, blood draw, and to maintain line patency      CAPILLARY BLOOD GLUCOSE      POCT Blood Glucose.: 172 mg/dL (2023 05:20)  POCT Blood Glucose.: 200 mg/dL (2023 00:07)  POCT Blood Glucose.: 199 mg/dL (2023 19:31)  POCT Blood Glucose.: 231 mg/dL (2023 17:51)  POCT Blood Glucose.: 365 mg/dL (2023 13:30)    I&O's Summary      VITALS:   T(C): 37.2 (23 @ 04:41), Max: 37.4 (23 @ 22:29)  HR: 95 (23 @ 04:41) (78 - 98)  BP: 139/64 (23 @ 04:41) (110/69 - 152/74)  RR: 18 (23 @ 04:41) (16 - 20)  SpO2: 93% (23 @ 04:41) (93% - 98%)      GENERAL: NAD, lying in bed comfortably  HEAD:  Atraumatic, normocephalic  EYES: EOMI, PERRLA, conjunctiva and sclera clear  ENT: Moist mucous membranes  NECK: Supple, no JVD  HEART: Regular rate and rhythm, no murmurs, rubs, or gallops  LUNGS: Unlabored respirations.  Clear to auscultation bilaterally, no crackles, wheezing, or rhonchi  ABDOMEN: +TTP RUQ > epigastrium, +Zafar's, no guarding or rebound soft, nondistended, +BS  EXTREMITIES: +bilateral UE lymphedema R > L, 1+ bilateral LE pitting edema, 2+ peripheral pulses bilaterally, femoral central line in place c/d/i  NERVOUS SYSTEM: A&Ox3, no focal deficits   SKIN: No rashes or lesions      LABS:                        13.2   15.27 )-----------( 262      ( 2023 06:51 )             42.0         136  |  98  |  31<H>  ----------------------------<  323<H>  4.2   |  24  |  1.58<H>    Ca    9.4      2023 16:14  Mg     1.9         TPro  7.6  /  Alb  4.2  /  TBili  0.5  /  DBili  x   /  AST  15  /  ALT  16  /  AlkPhos  99            Urinalysis Basic - ( 2023 16:21 )    Color: Light Yellow / Appearance: Clear / S.025 / pH: x  Gluc: x / Ketone: Small  / Bili: Negative / Urobili: Negative   Blood: x / Protein: 30 mg/dL / Nitrite: Negative   Leuk Esterase: Negative / RBC: 6 /hpf / WBC 2 /HPF   Sq Epi: x / Non Sq Epi: 2 /hpf / Bacteria: Negative          RADIOLOGY & ADDITIONAL TESTS:      < from: CT Abdomen and Pelvis No Cont (23 @ 11:02) >  IMPRESSION:  1.  Acute cholecystitis.  2.  Choledocholithiasis.    < end of copied text >

## 2023-01-19 NOTE — PHYSICAL THERAPY INITIAL EVALUATION ADULT - BALANCE DISTURBANCE, SYSTEM IMPAIRMENT CONTRIBUTE, REHAB EVAL
musculoskeletal
Eyes with no visual disturbances.  Ears clean and dry and no hearing difficulties. Nose with pink mucosa and no drainage.  Mouth mucous membranes moist and pink.  No tenderness or swelling to throat or neck.

## 2023-01-20 LAB
ALBUMIN SERPL ELPH-MCNC: 2.9 G/DL — LOW (ref 3.3–5)
ALP SERPL-CCNC: 78 U/L — SIGNIFICANT CHANGE UP (ref 40–120)
ALT FLD-CCNC: 28 U/L — SIGNIFICANT CHANGE UP (ref 10–45)
ANION GAP SERPL CALC-SCNC: 11 MMOL/L — SIGNIFICANT CHANGE UP (ref 5–17)
APTT BLD: 31.3 SEC — SIGNIFICANT CHANGE UP (ref 27.5–35.5)
AST SERPL-CCNC: 21 U/L — SIGNIFICANT CHANGE UP (ref 10–40)
BILIRUB SERPL-MCNC: 0.7 MG/DL — SIGNIFICANT CHANGE UP (ref 0.2–1.2)
BLD GP AB SCN SERPL QL: NEGATIVE — SIGNIFICANT CHANGE UP
BUN SERPL-MCNC: 38 MG/DL — HIGH (ref 7–23)
CALCIUM SERPL-MCNC: 9 MG/DL — SIGNIFICANT CHANGE UP (ref 8.4–10.5)
CHLORIDE SERPL-SCNC: 101 MMOL/L — SIGNIFICANT CHANGE UP (ref 96–108)
CO2 SERPL-SCNC: 26 MMOL/L — SIGNIFICANT CHANGE UP (ref 22–31)
CREAT SERPL-MCNC: 2.44 MG/DL — HIGH (ref 0.5–1.3)
EGFR: 19 ML/MIN/1.73M2 — LOW
GLUCOSE BLDC GLUCOMTR-MCNC: 119 MG/DL — HIGH (ref 70–99)
GLUCOSE BLDC GLUCOMTR-MCNC: 135 MG/DL — HIGH (ref 70–99)
GLUCOSE BLDC GLUCOMTR-MCNC: 138 MG/DL — HIGH (ref 70–99)
GLUCOSE SERPL-MCNC: 145 MG/DL — HIGH (ref 70–99)
HCT VFR BLD CALC: 37.7 % — SIGNIFICANT CHANGE UP (ref 34.5–45)
HGB BLD-MCNC: 11.7 G/DL — SIGNIFICANT CHANGE UP (ref 11.5–15.5)
INR BLD: 1.6 RATIO — HIGH (ref 0.88–1.16)
MAGNESIUM SERPL-MCNC: 1.9 MG/DL — SIGNIFICANT CHANGE UP (ref 1.6–2.6)
MCHC RBC-ENTMCNC: 30.1 PG — SIGNIFICANT CHANGE UP (ref 27–34)
MCHC RBC-ENTMCNC: 31 GM/DL — LOW (ref 32–36)
MCV RBC AUTO: 96.9 FL — SIGNIFICANT CHANGE UP (ref 80–100)
NRBC # BLD: 0 /100 WBCS — SIGNIFICANT CHANGE UP (ref 0–0)
PHOSPHATE SERPL-MCNC: 3.5 MG/DL — SIGNIFICANT CHANGE UP (ref 2.5–4.5)
PLATELET # BLD AUTO: 230 K/UL — SIGNIFICANT CHANGE UP (ref 150–400)
POTASSIUM SERPL-MCNC: 4.1 MMOL/L — SIGNIFICANT CHANGE UP (ref 3.5–5.3)
POTASSIUM SERPL-SCNC: 4.1 MMOL/L — SIGNIFICANT CHANGE UP (ref 3.5–5.3)
PROT SERPL-MCNC: 5.9 G/DL — LOW (ref 6–8.3)
PROTHROM AB SERPL-ACNC: 18.7 SEC — HIGH (ref 10.5–13.4)
RBC # BLD: 3.89 M/UL — SIGNIFICANT CHANGE UP (ref 3.8–5.2)
RBC # FLD: 15.7 % — HIGH (ref 10.3–14.5)
RH IG SCN BLD-IMP: POSITIVE — SIGNIFICANT CHANGE UP
SODIUM SERPL-SCNC: 138 MMOL/L — SIGNIFICANT CHANGE UP (ref 135–145)
WBC # BLD: 16.59 K/UL — HIGH (ref 3.8–10.5)
WBC # FLD AUTO: 16.59 K/UL — HIGH (ref 3.8–10.5)

## 2023-01-20 PROCEDURE — 99233 SBSQ HOSP IP/OBS HIGH 50: CPT

## 2023-01-20 PROCEDURE — 74328 X-RAY BILE DUCT ENDOSCOPY: CPT | Mod: 26

## 2023-01-20 PROCEDURE — 43274 ERCP DUCT STENT PLACEMENT: CPT

## 2023-01-20 PROCEDURE — 43237 ENDOSCOPIC US EXAM ESOPH: CPT

## 2023-01-20 DEVICE — CATH BLLN EXTRACT DIST GUIDE WIRE 15MM 3LUM: Type: IMPLANTABLE DEVICE | Status: FUNCTIONAL

## 2023-01-20 DEVICE — BLLN EXTRACT FUSION QUATRO 8.5 10 12 15MM: Type: IMPLANTABLE DEVICE | Status: FUNCTIONAL

## 2023-01-20 DEVICE — HYDRATOME 44: Type: IMPLANTABLE DEVICE | Status: FUNCTIONAL

## 2023-01-20 DEVICE — AUTOTOME CANNULATING SPHINCTEROTOME RX 44 20MM: Type: IMPLANTABLE DEVICE | Status: FUNCTIONAL

## 2023-01-20 DEVICE — STENT BIL ADVANIX RX 10FRX5CM: Type: IMPLANTABLE DEVICE | Status: FUNCTIONAL

## 2023-01-20 RX ORDER — SODIUM CHLORIDE 9 MG/ML
1000 INJECTION, SOLUTION INTRAVENOUS ONCE
Refills: 0 | Status: COMPLETED | OUTPATIENT
Start: 2023-01-20 | End: 2023-01-20

## 2023-01-20 RX ORDER — SODIUM CHLORIDE 9 MG/ML
1 INJECTION, SOLUTION INTRAVENOUS ONCE
Refills: 0 | Status: DISCONTINUED | OUTPATIENT
Start: 2023-01-20 | End: 2023-01-20

## 2023-01-20 RX ORDER — HEPARIN SODIUM 5000 [USP'U]/ML
5000 INJECTION INTRAVENOUS; SUBCUTANEOUS EVERY 8 HOURS
Refills: 0 | Status: DISCONTINUED | OUTPATIENT
Start: 2023-01-20 | End: 2023-01-24

## 2023-01-20 RX ORDER — INDOMETHACIN 50 MG
100 CAPSULE ORAL ONCE
Refills: 0 | Status: DISCONTINUED | OUTPATIENT
Start: 2023-01-20 | End: 2023-01-30

## 2023-01-20 RX ORDER — SODIUM CHLORIDE 9 MG/ML
1000 INJECTION INTRAMUSCULAR; INTRAVENOUS; SUBCUTANEOUS
Refills: 0 | Status: DISCONTINUED | OUTPATIENT
Start: 2023-01-20 | End: 2023-01-21

## 2023-01-20 RX ADMIN — Medication 81 MILLIGRAM(S): at 11:55

## 2023-01-20 RX ADMIN — GABAPENTIN 200 MILLIGRAM(S): 400 CAPSULE ORAL at 06:25

## 2023-01-20 RX ADMIN — PIPERACILLIN AND TAZOBACTAM 25 GRAM(S): 4; .5 INJECTION, POWDER, LYOPHILIZED, FOR SOLUTION INTRAVENOUS at 17:44

## 2023-01-20 RX ADMIN — SODIUM CHLORIDE 500 MILLILITER(S): 9 INJECTION, SOLUTION INTRAVENOUS at 18:19

## 2023-01-20 RX ADMIN — ATORVASTATIN CALCIUM 40 MILLIGRAM(S): 80 TABLET, FILM COATED ORAL at 22:53

## 2023-01-20 RX ADMIN — GABAPENTIN 200 MILLIGRAM(S): 400 CAPSULE ORAL at 17:45

## 2023-01-20 RX ADMIN — CHLORHEXIDINE GLUCONATE 1 APPLICATION(S): 213 SOLUTION TOPICAL at 06:28

## 2023-01-20 RX ADMIN — Medication 150 MILLIGRAM(S): at 09:02

## 2023-01-20 RX ADMIN — AMLODIPINE BESYLATE 5 MILLIGRAM(S): 2.5 TABLET ORAL at 09:03

## 2023-01-20 RX ADMIN — GABAPENTIN 200 MILLIGRAM(S): 400 CAPSULE ORAL at 22:53

## 2023-01-20 RX ADMIN — INSULIN GLARGINE 10 UNIT(S): 100 INJECTION, SOLUTION SUBCUTANEOUS at 22:53

## 2023-01-20 RX ADMIN — PIPERACILLIN AND TAZOBACTAM 25 GRAM(S): 4; .5 INJECTION, POWDER, LYOPHILIZED, FOR SOLUTION INTRAVENOUS at 04:46

## 2023-01-20 RX ADMIN — SPIRONOLACTONE 12.5 MILLIGRAM(S): 25 TABLET, FILM COATED ORAL at 09:02

## 2023-01-20 RX ADMIN — HEPARIN SODIUM 5000 UNIT(S): 5000 INJECTION INTRAVENOUS; SUBCUTANEOUS at 22:53

## 2023-01-20 NOTE — PROGRESS NOTE ADULT - SUBJECTIVE AND OBJECTIVE BOX
PROGRESS NOTE:     Patient is a 82y old  Female who presents with a chief complaint of acute cholecystitis, DKA (2023 16:26)      ---------------------------------------------------  Dmitry Dangelo  PGY-1, Internal Medicine  Available on [employer] Teams  Pager: [phone] ([facility]), or [phone] ([facility])  ---------------------------------------------------    SUBJECTIVE / OVERNIGHT EVENTS:    No acute events overnight. Patient examined at bedside with no acute complaints.     Pain:  Bowel Movements:  Urination:  OOB:  PT:    REVIEW OF SYSTEMS:    CONSTITUTIONAL: No weakness, fevers or chills  EYES/ENT: No visual changes;  No vertigo or throat pain   NECK: No pain or stiffness  RESPIRATORY: No cough, wheezing, hemoptysis; No shortness of breath  CARDIOVASCULAR: No chest pain or palpitations  GASTROINTESTINAL: No abdominal or epigastric pain. No nausea, vomiting, or hematemesis; No diarrhea or constipation. No melena or hematochezia.  GENITOURINARY: No dysuria, frequency or hematuria  NEUROLOGICAL: No numbness or weakness  SKIN: No itching, rashes      MEDICATIONS  (STANDING):  amLODIPine   Tablet 5 milliGRAM(s) Oral daily  aspirin  chewable 81 milliGRAM(s) Oral daily  atorvastatin 40 milliGRAM(s) Oral at bedtime  chlorhexidine 4% Liquid 1 Application(s) Topical <User Schedule>  dextrose 5%. 1000 milliLiter(s) (100 mL/Hr) IV Continuous <Continuous>  dextrose 5%. 1000 milliLiter(s) (50 mL/Hr) IV Continuous <Continuous>  dextrose 50% Injectable 25 Gram(s) IV Push once  dextrose 50% Injectable 12.5 Gram(s) IV Push once  dextrose 50% Injectable 25 Gram(s) IV Push once  gabapentin 200 milliGRAM(s) Oral three times a day  glucagon  Injectable 1 milliGRAM(s) IntraMuscular once  insulin glargine Injectable (LANTUS) 10 Unit(s) SubCutaneous at bedtime  insulin lispro (ADMELOG) corrective regimen sliding scale   SubCutaneous Before meals and at bedtime  metoprolol succinate  milliGRAM(s) Oral daily  piperacillin/tazobactam IVPB.. 3.375 Gram(s) IV Intermittent every 8 hours  sodium chloride 0.9%. 1000 milliLiter(s) (70 mL/Hr) IV Continuous <Continuous>  spironolactone 12.5 milliGRAM(s) Oral daily    MEDICATIONS  (PRN):  acetaminophen     Tablet .. 650 milliGRAM(s) Oral every 6 hours PRN Moderate Pain (4 - 6)  aluminum hydroxide/magnesium hydroxide/simethicone Suspension 30 milliLiter(s) Oral every 6 hours PRN Dyspepsia  dextrose Oral Gel 15 Gram(s) Oral once PRN Blood Glucose LESS THAN 70 milliGRAM(s)/deciliter  morphine  - Injectable 1 milliGRAM(s) IV Push every 6 hours PRN Severe Pain (7 - 10)  sodium chloride 0.9% lock flush 10 milliLiter(s) IV Push every 1 hour PRN Pre/post blood products, medications, blood draw, and to maintain line patency      CAPILLARY BLOOD GLUCOSE      POCT Blood Glucose.: 167 mg/dL (2023 21:24)  POCT Blood Glucose.: 190 mg/dL (2023 17:00)  POCT Blood Glucose.: 134 mg/dL (2023 11:41)    I&O's Summary    2023 07:01  -  2023 07:00  --------------------------------------------------------  IN: 400 mL / OUT: 600 mL / NET: -200 mL        VITALS:   T(C): 36.7 (23 @ 04:35), Max: 37.2 (23 @ 00:39)  HR: 83 (23 @ 06:22) (83 - 100)  BP: 103/64 (23 @ 06:22) (91/57 - 145/80)  RR: 18 (23 @ 04:35) (17 - 18)  SpO2: 98% (23 @ 04:35) (93% - 98%)    GENERAL: NAD, lying in bed comfortably  HEAD:  Atraumatic, normocephalic  EYES: EOMI, PERRLA, conjunctiva and sclera clear  ENT: Moist mucous membranes  NECK: Supple, no JVD  HEART: Regular rate and rhythm, no murmurs, rubs, or gallops  LUNGS: Unlabored respirations.  Clear to auscultation bilaterally, no crackles, wheezing, or rhonchi  ABDOMEN: Soft, nontender, nondistended, +BS  EXTREMITIES: 2+ peripheral pulses bilaterally. No clubbing, cyanosis, or edema  NERVOUS SYSTEM:  A&Ox3, no focal deficits   SKIN: No rashes or lesions    LABS:                        11.7   16.59 )-----------( 230      ( 2023 06:35 )             37.7         139  |  100  |  26<H>  ----------------------------<  203<H>  4.1   |  26  |  1.62<H>    Ca    9.4      2023 06:58  Mg     1.9         TPro  6.8  /  Alb  3.6  /  TBili  0.7  /  DBili  x   /  AST  49<H>  /  ALT  49<H>  /  AlkPhos  99      PT/INR - ( 2023 06:35 )   PT: 18.7 sec;   INR: 1.60 ratio         PTT - ( 2023 06:35 )  PTT:31.3 sec      Urinalysis Basic - ( 2023 16:21 )    Color: Light Yellow / Appearance: Clear / S.025 / pH: x  Gluc: x / Ketone: Small  / Bili: Negative / Urobili: Negative   Blood: x / Protein: 30 mg/dL / Nitrite: Negative   Leuk Esterase: Negative / RBC: 6 /hpf / WBC 2 /HPF   Sq Epi: x / Non Sq Epi: 2 /hpf / Bacteria: Negative          RADIOLOGY & ADDITIONAL TESTS:  Results Reviewed:   Imaging Personally Reviewed:  Electrocardiogram Personally Reviewed:    COORDINATION OF CARE:  Care Discussed with Consultants/Other Providers [Y/N]:  Prior or Outpatient Records Reviewed [Y/N]:     PROGRESS NOTE:     Patient is a 82y old  Female who presents with a chief complaint of acute cholecystitis, DKA (2023 16:26)      ---------------------------------------------------  Dmitry Dangelo  PGY-1, Internal Medicine  Available on [employer] Teams  Pager: [phone] ([facility]), or [phone] ([facility])  ---------------------------------------------------    SUBJECTIVE / OVERNIGHT EVENTS:    Patient mildly hypotensive overnight, self resolving. Patient examined at bedside with no acute complaints. Jeff  Isabella provided translation services,       MEDICATIONS  (STANDING):  amLODIPine   Tablet 5 milliGRAM(s) Oral daily  aspirin  chewable 81 milliGRAM(s) Oral daily  atorvastatin 40 milliGRAM(s) Oral at bedtime  chlorhexidine 4% Liquid 1 Application(s) Topical <User Schedule>  dextrose 5%. 1000 milliLiter(s) (100 mL/Hr) IV Continuous <Continuous>  dextrose 5%. 1000 milliLiter(s) (50 mL/Hr) IV Continuous <Continuous>  dextrose 50% Injectable 25 Gram(s) IV Push once  dextrose 50% Injectable 12.5 Gram(s) IV Push once  dextrose 50% Injectable 25 Gram(s) IV Push once  gabapentin 200 milliGRAM(s) Oral three times a day  glucagon  Injectable 1 milliGRAM(s) IntraMuscular once  insulin glargine Injectable (LANTUS) 10 Unit(s) SubCutaneous at bedtime  insulin lispro (ADMELOG) corrective regimen sliding scale   SubCutaneous Before meals and at bedtime  metoprolol succinate  milliGRAM(s) Oral daily  piperacillin/tazobactam IVPB.. 3.375 Gram(s) IV Intermittent every 8 hours  sodium chloride 0.9%. 1000 milliLiter(s) (70 mL/Hr) IV Continuous <Continuous>  spironolactone 12.5 milliGRAM(s) Oral daily    MEDICATIONS  (PRN):  acetaminophen     Tablet .. 650 milliGRAM(s) Oral every 6 hours PRN Moderate Pain (4 - 6)  aluminum hydroxide/magnesium hydroxide/simethicone Suspension 30 milliLiter(s) Oral every 6 hours PRN Dyspepsia  dextrose Oral Gel 15 Gram(s) Oral once PRN Blood Glucose LESS THAN 70 milliGRAM(s)/deciliter  morphine  - Injectable 1 milliGRAM(s) IV Push every 6 hours PRN Severe Pain (7 - 10)  sodium chloride 0.9% lock flush 10 milliLiter(s) IV Push every 1 hour PRN Pre/post blood products, medications, blood draw, and to maintain line patency      CAPILLARY BLOOD GLUCOSE      POCT Blood Glucose.: 167 mg/dL (2023 21:24)  POCT Blood Glucose.: 190 mg/dL (2023 17:00)  POCT Blood Glucose.: 134 mg/dL (2023 11:41)    I&O's Summary    2023 07:01  -  2023 07:00  --------------------------------------------------------  IN: 400 mL / OUT: 600 mL / NET: -200 mL        VITALS:   T(C): 36.7 (23 @ 04:35), Max: 37.2 (23 @ 00:39)  HR: 83 (23 @ 06:22) (83 - 100)  BP: 103/64 (23 @ 06:22) (91/57 - 145/80)  RR: 18 (23 @ 04:35) (17 - 18)  SpO2: 98% (23 @ 04:35) (93% - 98%)      GENERAL: NAD, lying in bed comfortably  HEAD:  Atraumatic, normocephalic  EYES: EOMI, PERRLA, conjunctiva and sclera clear  ENT: Moist mucous membranes  NECK: Supple, no JVD  HEART: Regular rate and rhythm, no murmurs, rubs, or gallops  LUNGS: Unlabored respirations.  Clear to auscultation bilaterally, no crackles, wheezing, or rhonchi  ABDOMEN: +Zafar's, no guarding or rebound soft, nondistended, +BS  EXTREMITIES: +bilateral UE lymphedema R > L, 1+ bilateral LE pitting edema, 2+ peripheral pulses bilaterally, femoral central line in place c/d/i  NERVOUS SYSTEM: A&Ox3, no focal deficits   SKIN: No rashes or lesions        LABS:                        11.7   16.59 )-----------( 230      ( 2023 06:35 )             37.7         139  |  100  |  26<H>  ----------------------------<  203<H>  4.1   |  26  |  1.62<H>    Ca    9.4      2023 06:58  Mg     1.9         TPro  6.8  /  Alb  3.6  /  TBili  0.7  /  DBili  x   /  AST  49<H>  /  ALT  49<H>  /  AlkPhos  99      PT/INR - ( 2023 06:35 )   PT: 18.7 sec;   INR: 1.60 ratio         PTT - ( 2023 06:35 )  PTT:31.3 sec      Urinalysis Basic - ( 2023 16:21 )    Color: Light Yellow / Appearance: Clear / S.025 / pH: x  Gluc: x / Ketone: Small  / Bili: Negative / Urobili: Negative   Blood: x / Protein: 30 mg/dL / Nitrite: Negative   Leuk Esterase: Negative / RBC: 6 /hpf / WBC 2 /HPF   Sq Epi: x / Non Sq Epi: 2 /hpf / Bacteria: Negative      RADIOLOGY & ADDITIONAL TESTS:    < from: US Abdomen Upper Quadrant Right (23 @ 12:36) >  IMPRESSION:    Diffuse hepatic steatosis.    Gallbladder stones, wall thickening, and sonographic Zafar sign.   Findings consistent with for acute cholecystitis.    Mild intrahepatic biliary ductal dilatation. Dilated common bile duct   measuring 12 mm. The choledocholithiasis within the distal common bile   duct is not well appreciated on this examination.    Severe chronic right-sided hydronephrosis.    < end of copied text >

## 2023-01-20 NOTE — PROGRESS NOTE ADULT - PROBLEM SELECTOR PLAN 2
Patient presenting with mild DKA given BHB of 2 and serum glucose of 450  - resolved, or likely was due to starvation ketosis given AG closed, pH normal, HCO3 normal, glucose improving  - adequate response to 4U regular insulin  - s/p 750 cc IV hydration, judicious hydration given HFpEF  - patient on oral agents: repaglinide and semaglutide  - upon last admission 8/2022, patient was well controlled on lantus 16U QHS and admelog 7U TID  - A1c stable at 9.6    Plan:  > continue with lantus 10U QHS  > moderate dose correctional sliding scale q6h while NPO, with meals while eating

## 2023-01-20 NOTE — PROGRESS NOTE ADULT - ASSESSMENT
Assessment: 82 year old woman with acute cholecystitis and multiple medical comorbidities presents with abdominal pain.    Recs:  - No surgical intervention at this time given poor operative candidate   - GI consult for CBD 1.2cm, fu possible ERCP  - Recommend IR to eval for perc rissa   - Continue IV abx   - will follow     [facility]   [id number]

## 2023-01-20 NOTE — CHART NOTE - NSCHARTNOTEFT_GEN_A_CORE
Dr Mohsinuzzama Khan of internal medicine called regarding increased WBC (16.6 -> 15.3 -> 16.6). Per Dr. Monroy, GI is planning for ERCP today.    Discussed case with IR attending Dr. Holder. ERCP will likely help patient. Defer emergent perc rissa for now.

## 2023-01-20 NOTE — PRE PROCEDURE NOTE - PRE PROCEDURE EVALUATION
Attending Physician:     Dr. Blanchard                       Procedure: EGD/EUS/ERCP    Indication for Procedure: CBD dilation, CBD stone  ________________________________________________________  PAST MEDICAL & SURGICAL HISTORY:  Breast CA, right  1989 s/p mastectomy ,IV Chemotherapy      HTN (hypertension)      Lymphedema of arm  right arm s/p mastectomy      Hyperlipidemia      Rectal cancer  s/p chemotherapy and  radiation 12 weeks 2/2015 -3/2015      Obese      Type II diabetes mellitus      CHF (congestive heart failure)      DVT of leg (deep venous thrombosis)  right calf DVT  2/2019 pt on Eliquis      Renal insufficiency      S/P mastectomy, right  1989      History of appendectomy  1953      S/P ileostomy  low anterior resection-8/10/15, reversal of ileostomy 2016      S/P colon resection  2015      S/P TKR (total knee replacement), right  2017        ALLERGIES:  CT scan dye (Hives)  penicillin (Unknown)    HOME MEDICATIONS:  amLODIPine 5 mg oral tablet: 1 tab(s) orally once a day  apixaban 2.5 mg oral tablet: 1 tab(s) orally 2 times a day  aspirin 81 mg oral tablet, chewable: 1 tab(s) orally once a day  bumetanide 1 mg oral tablet: 1 tab(s) orally once a day  Gralise 600 mg/24 hours oral tablet, extended release: 1 tab(s) orally once a day  spironolactone 25 mg oral tablet: 0.5 tab(s) orally once a day  Toprol- mg oral tablet, extended release: 1.5 tab(s) orally once a day    AICD/PPM: [ ] yes   [x] no    PERTINENT LAB DATA:                        11.7   16.59 )-----------( 230      ( 20 Jan 2023 06:35 )             37.7     01-20    138  |  101  |  38<H>  ----------------------------<  145<H>  4.1   |  26  |  2.44<H>    Ca    9.0      20 Jan 2023 06:35  Phos  3.5     01-20  Mg     1.9     01-20    TPro  5.9<L>  /  Alb  2.9<L>  /  TBili  0.7  /  DBili  x   /  AST  21  /  ALT  28  /  AlkPhos  78  01-20    PT/INR - ( 20 Jan 2023 06:35 )   PT: 18.7 sec;   INR: 1.60 ratio         PTT - ( 20 Jan 2023 06:35 )  PTT:31.3 sec            PHYSICAL EXAMINATION:    T(C): 36.5  HR: 91  BP: 110/74  RR: 18  SpO2: 93%    Constitutional: NAD    Respiratory: CTAB/L  Cardiovascular: S1 and S2  Gastrointestinal: BS+, soft, NT/ND  Extremities: No peripheral edema  Neurological: A/O x 3, no focal deficits        COMMENTS:    The patient is a suitable candidate for the planned procedure unless box checked [ ]  No, explain:

## 2023-01-20 NOTE — PROGRESS NOTE ADULT - SUBJECTIVE AND OBJECTIVE BOX
Overnight events noted    no distress  VITAL:  T(C): , Max: 37.2 (23 @ 00:39)  T(F): , Max: 99 (23 @ 00:39)  HR: 91 (23 @ 13:04)  BP: 110/74 (23 @ 13:04)  BP(mean): --  RR: 18 (23 @ 13:04)  SpO2: 93% (23 @ 13:04)  Wt(kg): --      PHYSICAL EXAM:  General: Alerted, oriented  HEENT: MMM  Lungs:CTA-b/l  Heart: RRR S1S2  Abdomen: Soft, NTND  Ext: no pedal edema b/l  : no chen      LABS:                        11.7   16.59 )-----------( 230      ( 2023 06:35 )             37.7     Na(138)/K(4.1)/Cl(101)/HCO3(26)/BUN(38)/Cr(2.44)Glu(145)/Ca(9.0)/Mg(1.9)/PO4(3.5)     @ 06:35  Na(139)/K(4.1)/Cl(100)/HCO3(26)/BUN(26)/Cr(1.62)Glu(203)/Ca(9.4)/Mg(--)/PO4(--)     @ 06:58  Na(136)/K(4.2)/Cl(98)/HCO3(24)/BUN(31)/Cr(1.58)Glu(323)/Ca(9.4)/Mg(--)/PO4(--)     @ 16:14  Na(135)/K(4.4)/Cl(94)/HCO3(22)/BUN(34)/Cr(1.77)Glu(458)/Ca(10.1)/Mg(1.9)/PO4(--)     @ 10:23    Urinalysis Basic - ( 2023 16:21 )    Color: Light Yellow / Appearance: Clear / S.025 / pH: x  Gluc: x / Ketone: Small  / Bili: Negative / Urobili: Negative   Blood: x / Protein: 30 mg/dL / Nitrite: Negative   Leuk Esterase: Negative / RBC: 6 /hpf / WBC 2 /HPF   Sq Epi: x / Non Sq Epi: 2 /hpf / Bacteria: Negative          ASSESSMENT:  Patient is a 80 year old F with a PMHx of HTN, HLD, T2DM, HFpEF (EF 65% X/), breast cancer s/p R partial mastectomy, rectal carcinoma s/p resection, CAD s/p CABG (2020) who presents for 1 day of progressively worsening abdominal pain associated with nausea and NBNB vomiting. 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  3)ID:on IV abx  4) surgery on board , conservative management   IR on board perc  likely tomorrow (cholecystostomy drainage)               Overnight events noted      no distress ,  cr worsening   VITAL:  T(C): , Max: 37.2 (23 @ 00:39)  T(F): , Max: 99 (23 @ 00:39)  HR: 91 (23 @ 13:04)  BP: 110/74 (23 @ 13:04)  BP(mean): --  RR: 18 (23 @ 13:04)  SpO2: 93% (23 @ 13:04)  Wt(kg): --      PHYSICAL EXAM:  General: Alerted, oriented  HEENT: MMM  Lungs:CTA-b/l  Heart: RRR S1S2  Abdomen: Soft, NTND, RUQ slight tenderness   Ext: no pedal edema b/l  : no chen      LABS:                        11.7   16.59 )-----------( 230      ( 2023 06:35 )             37.7     Na(138)/K(4.1)/Cl(101)/HCO3(26)/BUN(38)/Cr(2.44)Glu(145)/Ca(9.0)/Mg(1.9)/PO4(3.5)     @ 06:35  Na(139)/K(4.1)/Cl(100)/HCO3(26)/BUN(26)/Cr(1.62)Glu(203)/Ca(9.4)/Mg(--)/PO4(--)     @ 06:58  Na(136)/K(4.2)/Cl(98)/HCO3(24)/BUN(31)/Cr(1.58)Glu(323)/Ca(9.4)/Mg(--)/PO4(--)     @ 16:14  Na(135)/K(4.4)/Cl(94)/HCO3(22)/BUN(34)/Cr(1.77)Glu(458)/Ca(10.1)/Mg(1.9)/PO4(--)     @ 10:23    Urinalysis Basic - ( 2023 16:21 )    Color: Light Yellow / Appearance: Clear / S.025 / pH: x  Gluc: x / Ketone: Small  / Bili: Negative / Urobili: Negative   Blood: x / Protein: 30 mg/dL / Nitrite: Negative   Leuk Esterase: Negative / RBC: 6 /hpf / WBC 2 /HPF   Sq Epi: x / Non Sq Epi: 2 /hpf / Bacteria: Negative          ASSESSMENT:  Patient is a 80 year old F with a PMHx of HTN, HLD, T2DM, HFpEF (EF 65% X/), breast cancer s/p R partial mastectomy, rectal carcinoma s/p resection, CAD s/p CABG (2020) who presents for 1 day of progressively worsening abdominal pain associated with nausea and NBNB vomiting. 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  3)ID:on IV abx  4) surgery on board , conservative management   IR on board likely  perc (cholecystostomy drainage)            [facility]  Office: [phone]     Overnight events noted      no distress ,  cr worsening   VITAL:  T(C): , Max: 37.2 (23 @ 00:39)  T(F): , Max: 99 (23 @ 00:39)  HR: 91 (23 @ 13:04)  BP: 110/74 (23 @ 13:04)  BP(mean): --  RR: 18 (23 @ 13:04)  SpO2: 93% (23 @ 13:04)  Wt(kg): --      PHYSICAL EXAM:  General: Alerted, oriented  HEENT: MMM  Lungs:CTA-b/l  Heart: RRR S1S2  Abdomen: Soft, NTND, RUQ slight tenderness   Ext: no pedal edema b/l  : no chen      LABS:                        11.7   16.59 )-----------( 230      ( 2023 06:35 )             37.7     Na(138)/K(4.1)/Cl(101)/HCO3(26)/BUN(38)/Cr(2.44)Glu(145)/Ca(9.0)/Mg(1.9)/PO4(3.5)     @ 06:35  Na(139)/K(4.1)/Cl(100)/HCO3(26)/BUN(26)/Cr(1.62)Glu(203)/Ca(9.4)/Mg(--)/PO4(--)     @ 06:58  Na(136)/K(4.2)/Cl(98)/HCO3(24)/BUN(31)/Cr(1.58)Glu(323)/Ca(9.4)/Mg(--)/PO4(--)     @ 16:14  Na(135)/K(4.4)/Cl(94)/HCO3(22)/BUN(34)/Cr(1.77)Glu(458)/Ca(10.1)/Mg(1.9)/PO4(--)     @ 10:23    Urinalysis Basic - ( 2023 16:21 )    Color: Light Yellow / Appearance: Clear / S.025 / pH: x  Gluc: x / Ketone: Small  / Bili: Negative / Urobili: Negative   Blood: x / Protein: 30 mg/dL / Nitrite: Negative   Leuk Esterase: Negative / RBC: 6 /hpf / WBC 2 /HPF   Sq Epi: x / Non Sq Epi: 2 /hpf / Bacteria: Negative          ASSESSMENT:  Patient is a 80 year old F with a PMHx of HTN, HLD, T2DM, HFpEF (EF 65% X/), breast cancer s/p R partial mastectomy, rectal carcinoma s/p resection, CAD s/p CABG (2020) who presents for 1 day of progressively worsening abdominal pain associated with nausea and NBNB vomiting. 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  3)ID:on IV abx  4) surgery on board , conservative management   IR on board likely  perc?   5)GI on board EGD /EUS/ERCP          [facility]  Office: [phone]

## 2023-01-20 NOTE — PROGRESS NOTE ADULT - SUBJECTIVE AND OBJECTIVE BOX
SUBJECTIVE:   Seen and examined at bedside.     OBJECTIVE: T(C): 36.9 (23 @ 12:38), Max: 37.2 (23 @ 00:39)  HR: 83 (23 @ 12:38) (83 - 100)  BP: 107/57 (23 @ 12:38) (91/57 - 145/64)  RR: 18 (23 @ 12:38) (17 - 18)  SpO2: 93% (23 @ 12:38) (93% - 98%)  Wt(kg): --  I&O's Summary    2023 07:  -  2023 07:00  --------------------------------------------------------  IN: 750 mL / OUT: 900 mL / NET: -150 mL      I&O's Detail    2023 07:  -  2023 07:00  --------------------------------------------------------  IN:    IV PiggyBack: 100 mL    Oral Fluid: 650 mL  Total IN: 750 mL    OUT:    Voided (mL): 900 mL  Total OUT: 900 mL    Total NET: -150 mL        PHYSICAL EXAM:   General: NAD, uncomfortable appearing   Resp: Good effort, CTA b/l  GI/Abd: Multiple well healed scars, mildly tender RUQ       MEDICATIONS  (STANDING):  aspirin  chewable 81 milliGRAM(s) Oral daily  atorvastatin 40 milliGRAM(s) Oral at bedtime  chlorhexidine 4% Liquid 1 Application(s) Topical <User Schedule>  dextrose 5%. 1000 milliLiter(s) (100 mL/Hr) IV Continuous <Continuous>  dextrose 5%. 1000 milliLiter(s) (50 mL/Hr) IV Continuous <Continuous>  dextrose 50% Injectable 25 Gram(s) IV Push once  dextrose 50% Injectable 12.5 Gram(s) IV Push once  dextrose 50% Injectable 25 Gram(s) IV Push once  gabapentin 200 milliGRAM(s) Oral three times a day  glucagon  Injectable 1 milliGRAM(s) IntraMuscular once  insulin glargine Injectable (LANTUS) 10 Unit(s) SubCutaneous at bedtime  insulin lispro (ADMELOG) corrective regimen sliding scale   SubCutaneous Before meals and at bedtime  metoprolol succinate  milliGRAM(s) Oral daily  piperacillin/tazobactam IVPB.. 3.375 Gram(s) IV Intermittent every 8 hours  sodium chloride 0.9%. 1000 milliLiter(s) (75 mL/Hr) IV Continuous <Continuous>  sodium chloride 0.9%. 1000 milliLiter(s) (70 mL/Hr) IV Continuous <Continuous>  spironolactone 12.5 milliGRAM(s) Oral daily    MEDICATIONS  (PRN):  acetaminophen     Tablet .. 650 milliGRAM(s) Oral every 6 hours PRN Moderate Pain (4 - 6)  aluminum hydroxide/magnesium hydroxide/simethicone Suspension 30 milliLiter(s) Oral every 6 hours PRN Dyspepsia  dextrose Oral Gel 15 Gram(s) Oral once PRN Blood Glucose LESS THAN 70 milliGRAM(s)/deciliter  morphine  - Injectable 1 milliGRAM(s) IV Push every 6 hours PRN Severe Pain (7 - 10)  sodium chloride 0.9% lock flush 10 milliLiter(s) IV Push every 1 hour PRN Pre/post blood products, medications, blood draw, and to maintain line patency      LABS:                        11.7   16.59 )-----------( 230      ( 2023 06:35 )             37.7     -    138  |  101  |  38<H>  ----------------------------<  145<H>  4.1   |  26  |  2.44<H>    Ca    9.0      2023 06:35  Phos  3.5     -  Mg     1.9         TPro  5.9<L>  /  Alb  2.9<L>  /  TBili  0.7  /  DBili  x   /  AST  21  /  ALT  28  /  AlkPhos  78  01-20    PT/INR - ( 2023 06:35 )   PT: 18.7 sec;   INR: 1.60 ratio         PTT - ( 2023 06:35 )  PTT:31.3 sec  Urinalysis Basic - ( 2023 16:21 )    Color: Light Yellow / Appearance: Clear / S.025 / pH: x  Gluc: x / Ketone: Small  / Bili: Negative / Urobili: Negative   Blood: x / Protein: 30 mg/dL / Nitrite: Negative   Leuk Esterase: Negative / RBC: 6 /hpf / WBC 2 /HPF   Sq Epi: x / Non Sq Epi: 2 /hpf / Bacteria: Negative

## 2023-01-20 NOTE — PROGRESS NOTE ADULT - SUBJECTIVE AND OBJECTIVE BOX
Cardiovascular Disease Progress Note  Date of service: 01-20-23 @ 07:09    Overnight events: No acute events overnight.  Pt is in no distress. Episodes of hypotension noted.   Otherwise review of systems negative    Objective Findings:  T(C): 36.7 (01-20-23 @ 04:35), Max: 37.2 (01-20-23 @ 00:39)  HR: 83 (01-20-23 @ 06:22) (83 - 100)  BP: 103/64 (01-20-23 @ 06:22) (91/57 - 145/80)  RR: 18 (01-20-23 @ 04:35) (17 - 18)  SpO2: 98% (01-20-23 @ 04:35) (93% - 98%)  Wt(kg): --  Daily     Daily       Physical Exam:  Gen: NAD; Patient resting comfortably  HEENT: EOMI, Normocephalic/ atraumatic  CV: RRR, normal S1 + S2, no m/r/g  Lungs:  Normal respiratory effort; clear to auscultation bilaterally  Abd: soft, non-tender; bowel sounds present  Ext: No edema; warm and well perfused    Telemetry:  N/a    Laboratory Data:                        11.7   16.59 )-----------( 230      ( 20 Jan 2023 06:35 )             37.7     01-19    139  |  100  |  26<H>  ----------------------------<  203<H>  4.1   |  26  |  1.62<H>    Ca    9.4      19 Jan 2023 06:58  Mg     1.9     01-18    TPro  6.8  /  Alb  3.6  /  TBili  0.7  /  DBili  x   /  AST  49<H>  /  ALT  49<H>  /  AlkPhos  99  01-19    PT/INR - ( 20 Jan 2023 06:35 )   PT: 18.7 sec;   INR: 1.60 ratio         PTT - ( 20 Jan 2023 06:35 )  PTT:31.3 sec          Inpatient Medications:  MEDICATIONS  (STANDING):  amLODIPine   Tablet 5 milliGRAM(s) Oral daily  aspirin  chewable 81 milliGRAM(s) Oral daily  atorvastatin 40 milliGRAM(s) Oral at bedtime  chlorhexidine 4% Liquid 1 Application(s) Topical <User Schedule>  dextrose 5%. 1000 milliLiter(s) (100 mL/Hr) IV Continuous <Continuous>  dextrose 5%. 1000 milliLiter(s) (50 mL/Hr) IV Continuous <Continuous>  dextrose 50% Injectable 25 Gram(s) IV Push once  dextrose 50% Injectable 12.5 Gram(s) IV Push once  dextrose 50% Injectable 25 Gram(s) IV Push once  gabapentin 200 milliGRAM(s) Oral three times a day  glucagon  Injectable 1 milliGRAM(s) IntraMuscular once  insulin glargine Injectable (LANTUS) 10 Unit(s) SubCutaneous at bedtime  insulin lispro (ADMELOG) corrective regimen sliding scale   SubCutaneous Before meals and at bedtime  metoprolol succinate  milliGRAM(s) Oral daily  piperacillin/tazobactam IVPB.. 3.375 Gram(s) IV Intermittent every 8 hours  sodium chloride 0.9%. 1000 milliLiter(s) (70 mL/Hr) IV Continuous <Continuous>  spironolactone 12.5 milliGRAM(s) Oral daily      Assessment:  80 year old woman with CAD s/p CABG in 2020, prior DVT on Eliquis, HTN, and compensated diastolic CHF presents with acute cholecystitis.     Plan of Care:    #Pre-operative risk evaluation-  Ms. Batista displays no signs or symptoms of acute coronary ischemia or decompensated CHF.  Admission EKG is sinus with pre-existing anteroseptal q-waves.  Recent echo noted- normal LV systolic function with no hemodynamically significant valvular disease.  Ms. Batista is optimized from a cardiac standpoint to proceed with low to intermediate risk EGD with ERCP.   Please continue beta blocker therapy throughout the chase-operative period.     #Compensated diastolic CHF-  Patient is euvolemic on exam.  Hold home diuretics given NPO status.     #CAD s/p CABG-  No signs of active ischemic.  ASA and statin as ordered.      #Sepsis  - 2/2 acute cholecystitis  - No surgery planned as pt is a poor candidate. IR to evaluate patient for percutaneous cholecystostomy after ERCP  - Abx as per primary team  - Recommend holding Amlodipine given soft BP readings.     Plan of Care:          Over 25 minutes spent on total encounter; more than 50% of the visit was spent counseling and/or coordinating care by the attending physician.      Vic Umana DO [facility]  Cardiovascular Disease  [phone]

## 2023-01-20 NOTE — PROGRESS NOTE ADULT - ATTENDING COMMENTS
date of service 1/20/23    pt seen and examined  pt chart and imaging reviewed in detail  agree with note above  82F with RUQ pain c/s acute cholecystitis and choledocolithiasis with significant co-morbidities including CAD, s/p CABG, CHF, poorly controlled DM  pt is poor operative candidate  resting in bed, NAD  anicteric  softly distended, +RUQ TTP no r/g  cont npo, ivf , iv abx  serial abd exams  f/u labs in am  f/u IR evaluation for possible perc cholecystostomy drainage  f/u GI for possible EUS/ERCP today  f/u cards for risk stratification  f/u endo for better FS control  cont supportive care per med  will follow with you  d/w pt & family / RN @ bedside in detail .

## 2023-01-20 NOTE — PROGRESS NOTE ADULT - PROBLEM SELECTOR PLAN 1
Patient presenting with 1 week of abdominal pain, nausea, and vomiting  - CT A/P: acute cholecystitis with 1.2 cm common bile duct dilatation  - per surgery, not a surgical candidate given comorbidities     Plan:  > GI consulted for choledocholithiasis: plan for ERCP tomorrow, cardiac pre-op risk stratification obtained, NPO after midnight  > continue with zosyn  > CLD today, NPO after midnight  > pain/nausea control with tylenol, morphine, maalox  > AVOID zofran given QTc 518  > trend CMP, coags, mild transaminitis otherwise stable Patient presenting with 1 week of abdominal pain, nausea, and vomiting  - CT A/P: acute cholecystitis with 1.2 cm common bile duct dilatation  - per surgery, not a surgical candidate given comorbidities     Plan:  > ERCP today, IR will reassess after for need for perc rissa, currently not necessary  > continue with zosyn  > pain/nausea control with tylenol and maalox  > AVOID zofran given QTc 518  > trend CMP, coags, otherwise stable

## 2023-01-20 NOTE — CHART NOTE - NSCHARTNOTEFT_GEN_A_CORE
s/p EGD/EUS/ERCP.    EGD- unremarkable, relatively normal EGD  EUS: +CBD stones  ERCP: large CBD stones on fluoro and relatively small sized ampulla; +small periampullary diverticula; s/p sphincterotomy with sphictertome + plastic CBD stent placement with successful drainage of bile/sludge    Balloon extraction not done due to small sized ampulla relative to large CBD stones. Will recommend pt to come back in 2-3 months for stone and stent removal.    Recs:  - diet as tolerated  - per chart review- IR will eval for perc rissa drain, poor surgical candidate for lap rissa  - full GI procedure note to follow  - will need to return for OP ERCP in 2-3 months for stent removal + stone extraction  *ERCP for stent removal and stone extraction as outpatient with [facility]; please call [phone] to make an appointment for [facility]    Findings and plan conveyed to pt's daughter and HCP Shauna.    Kavitha Castillo MD  GI Fellow, PGY-5  Available via Microsoft Teams    NON-URGENT CONSULTS:  Please email [email or web address]  AT NIGHT AND ON WEEKENDS:  Contact on-call GI fellow via answering service ([phone]) from 5pm-8am and on weekends/holidays  MONDAY-FRIDAY 8AM-5PM:  Pager# [phone] ([facility]) or [phone] ([facility])  GI Phone# [phone] ([facility])

## 2023-01-21 DIAGNOSIS — N18.9 CHRONIC KIDNEY DISEASE, UNSPECIFIED: ICD-10-CM

## 2023-01-21 LAB
ALBUMIN SERPL ELPH-MCNC: 2.7 G/DL — LOW (ref 3.3–5)
ALP SERPL-CCNC: 81 U/L — SIGNIFICANT CHANGE UP (ref 40–120)
ALT FLD-CCNC: 20 U/L — SIGNIFICANT CHANGE UP (ref 10–45)
AMYLASE P1 CFR SERPL: 117 U/L — SIGNIFICANT CHANGE UP (ref 25–125)
ANION GAP SERPL CALC-SCNC: 11 MMOL/L — SIGNIFICANT CHANGE UP (ref 5–17)
ANION GAP SERPL CALC-SCNC: 16 MMOL/L — SIGNIFICANT CHANGE UP (ref 5–17)
APPEARANCE UR: ABNORMAL
AST SERPL-CCNC: 14 U/L — SIGNIFICANT CHANGE UP (ref 10–40)
BACTERIA # UR AUTO: NEGATIVE — SIGNIFICANT CHANGE UP
BILIRUB SERPL-MCNC: 0.5 MG/DL — SIGNIFICANT CHANGE UP (ref 0.2–1.2)
BILIRUB UR-MCNC: NEGATIVE — SIGNIFICANT CHANGE UP
BUN SERPL-MCNC: 43 MG/DL — HIGH (ref 7–23)
BUN SERPL-MCNC: 44 MG/DL — HIGH (ref 7–23)
CALCIUM SERPL-MCNC: 8.3 MG/DL — LOW (ref 8.4–10.5)
CALCIUM SERPL-MCNC: 8.7 MG/DL — SIGNIFICANT CHANGE UP (ref 8.4–10.5)
CHLORIDE SERPL-SCNC: 100 MMOL/L — SIGNIFICANT CHANGE UP (ref 96–108)
CHLORIDE SERPL-SCNC: 101 MMOL/L — SIGNIFICANT CHANGE UP (ref 96–108)
CO2 SERPL-SCNC: 23 MMOL/L — SIGNIFICANT CHANGE UP (ref 22–31)
CO2 SERPL-SCNC: 26 MMOL/L — SIGNIFICANT CHANGE UP (ref 22–31)
COLOR SPEC: YELLOW — SIGNIFICANT CHANGE UP
COMMENT - URINE: SIGNIFICANT CHANGE UP
CREAT SERPL-MCNC: 2.54 MG/DL — HIGH (ref 0.5–1.3)
CREAT SERPL-MCNC: 2.67 MG/DL — HIGH (ref 0.5–1.3)
DIFF PNL FLD: ABNORMAL
EGFR: 17 ML/MIN/1.73M2 — LOW
EGFR: 18 ML/MIN/1.73M2 — LOW
EPI CELLS # UR: 7 /HPF — HIGH
GLUCOSE BLDC GLUCOMTR-MCNC: 127 MG/DL — HIGH (ref 70–99)
GLUCOSE BLDC GLUCOMTR-MCNC: 151 MG/DL — HIGH (ref 70–99)
GLUCOSE BLDC GLUCOMTR-MCNC: 172 MG/DL — HIGH (ref 70–99)
GLUCOSE BLDC GLUCOMTR-MCNC: 234 MG/DL — HIGH (ref 70–99)
GLUCOSE SERPL-MCNC: 151 MG/DL — HIGH (ref 70–99)
GLUCOSE SERPL-MCNC: 164 MG/DL — HIGH (ref 70–99)
GLUCOSE UR QL: ABNORMAL
HCT VFR BLD CALC: 37.6 % — SIGNIFICANT CHANGE UP (ref 34.5–45)
HGB BLD-MCNC: 11.5 G/DL — SIGNIFICANT CHANGE UP (ref 11.5–15.5)
HYALINE CASTS # UR AUTO: 1 /LPF — SIGNIFICANT CHANGE UP (ref 0–2)
KETONES UR-MCNC: NEGATIVE — SIGNIFICANT CHANGE UP
LEUKOCYTE ESTERASE UR-ACNC: ABNORMAL
LIDOCAIN IGE QN: 85 U/L — HIGH (ref 7–60)
MAGNESIUM SERPL-MCNC: 1.9 MG/DL — SIGNIFICANT CHANGE UP (ref 1.6–2.6)
MCHC RBC-ENTMCNC: 29.5 PG — SIGNIFICANT CHANGE UP (ref 27–34)
MCHC RBC-ENTMCNC: 30.6 GM/DL — LOW (ref 32–36)
MCV RBC AUTO: 96.4 FL — SIGNIFICANT CHANGE UP (ref 80–100)
NITRITE UR-MCNC: NEGATIVE — SIGNIFICANT CHANGE UP
NRBC # BLD: 0 /100 WBCS — SIGNIFICANT CHANGE UP (ref 0–0)
OSMOLALITY UR: 368 MOS/KG — SIGNIFICANT CHANGE UP (ref 300–900)
PH UR: 6 — SIGNIFICANT CHANGE UP (ref 5–8)
PHOSPHATE SERPL-MCNC: 3.8 MG/DL — SIGNIFICANT CHANGE UP (ref 2.5–4.5)
PLATELET # BLD AUTO: 188 K/UL — SIGNIFICANT CHANGE UP (ref 150–400)
POTASSIUM SERPL-MCNC: 3.8 MMOL/L — SIGNIFICANT CHANGE UP (ref 3.5–5.3)
POTASSIUM SERPL-MCNC: 4 MMOL/L — SIGNIFICANT CHANGE UP (ref 3.5–5.3)
POTASSIUM SERPL-SCNC: 3.8 MMOL/L — SIGNIFICANT CHANGE UP (ref 3.5–5.3)
POTASSIUM SERPL-SCNC: 4 MMOL/L — SIGNIFICANT CHANGE UP (ref 3.5–5.3)
POTASSIUM UR-SCNC: 53 MMOL/L — SIGNIFICANT CHANGE UP
PROT SERPL-MCNC: 6 G/DL — SIGNIFICANT CHANGE UP (ref 6–8.3)
PROT UR-MCNC: ABNORMAL
RBC # BLD: 3.9 M/UL — SIGNIFICANT CHANGE UP (ref 3.8–5.2)
RBC # FLD: 15.6 % — HIGH (ref 10.3–14.5)
RBC CASTS # UR COMP ASSIST: 39 /HPF — HIGH (ref 0–4)
SODIUM SERPL-SCNC: 137 MMOL/L — SIGNIFICANT CHANGE UP (ref 135–145)
SODIUM SERPL-SCNC: 140 MMOL/L — SIGNIFICANT CHANGE UP (ref 135–145)
SODIUM UR-SCNC: 27 MMOL/L — SIGNIFICANT CHANGE UP
SP GR SPEC: 1.02 — SIGNIFICANT CHANGE UP (ref 1.01–1.02)
UROBILINOGEN FLD QL: NEGATIVE — SIGNIFICANT CHANGE UP
WBC # BLD: 13.38 K/UL — HIGH (ref 3.8–10.5)
WBC # FLD AUTO: 13.38 K/UL — HIGH (ref 3.8–10.5)
WBC UR QL: 6 /HPF — HIGH (ref 0–5)

## 2023-01-21 PROCEDURE — 99232 SBSQ HOSP IP/OBS MODERATE 35: CPT | Mod: GC

## 2023-01-21 PROCEDURE — 71045 X-RAY EXAM CHEST 1 VIEW: CPT | Mod: 26

## 2023-01-21 PROCEDURE — 99233 SBSQ HOSP IP/OBS HIGH 50: CPT | Mod: GC

## 2023-01-21 RX ORDER — BUMETANIDE 0.25 MG/ML
2 INJECTION INTRAMUSCULAR; INTRAVENOUS ONCE
Refills: 0 | Status: COMPLETED | OUTPATIENT
Start: 2023-01-21 | End: 2023-01-21

## 2023-01-21 RX ADMIN — Medication 2: at 22:21

## 2023-01-21 RX ADMIN — Medication 4: at 12:25

## 2023-01-21 RX ADMIN — HEPARIN SODIUM 5000 UNIT(S): 5000 INJECTION INTRAVENOUS; SUBCUTANEOUS at 22:19

## 2023-01-21 RX ADMIN — BUMETANIDE 2 MILLIGRAM(S): 0.25 INJECTION INTRAMUSCULAR; INTRAVENOUS at 18:03

## 2023-01-21 RX ADMIN — PIPERACILLIN AND TAZOBACTAM 25 GRAM(S): 4; .5 INJECTION, POWDER, LYOPHILIZED, FOR SOLUTION INTRAVENOUS at 10:33

## 2023-01-21 RX ADMIN — INSULIN GLARGINE 10 UNIT(S): 100 INJECTION, SOLUTION SUBCUTANEOUS at 22:19

## 2023-01-21 RX ADMIN — GABAPENTIN 200 MILLIGRAM(S): 400 CAPSULE ORAL at 06:34

## 2023-01-21 RX ADMIN — GABAPENTIN 200 MILLIGRAM(S): 400 CAPSULE ORAL at 14:20

## 2023-01-21 RX ADMIN — Medication 150 MILLIGRAM(S): at 06:34

## 2023-01-21 RX ADMIN — Medication 650 MILLIGRAM(S): at 23:19

## 2023-01-21 RX ADMIN — SPIRONOLACTONE 12.5 MILLIGRAM(S): 25 TABLET, FILM COATED ORAL at 06:34

## 2023-01-21 RX ADMIN — HEPARIN SODIUM 5000 UNIT(S): 5000 INJECTION INTRAVENOUS; SUBCUTANEOUS at 06:38

## 2023-01-21 RX ADMIN — GABAPENTIN 200 MILLIGRAM(S): 400 CAPSULE ORAL at 22:20

## 2023-01-21 RX ADMIN — Medication 81 MILLIGRAM(S): at 12:45

## 2023-01-21 RX ADMIN — PIPERACILLIN AND TAZOBACTAM 25 GRAM(S): 4; .5 INJECTION, POWDER, LYOPHILIZED, FOR SOLUTION INTRAVENOUS at 01:51

## 2023-01-21 RX ADMIN — Medication 650 MILLIGRAM(S): at 22:19

## 2023-01-21 RX ADMIN — ATORVASTATIN CALCIUM 40 MILLIGRAM(S): 80 TABLET, FILM COATED ORAL at 22:20

## 2023-01-21 RX ADMIN — Medication 2: at 17:25

## 2023-01-21 RX ADMIN — PIPERACILLIN AND TAZOBACTAM 25 GRAM(S): 4; .5 INJECTION, POWDER, LYOPHILIZED, FOR SOLUTION INTRAVENOUS at 18:03

## 2023-01-21 RX ADMIN — HEPARIN SODIUM 5000 UNIT(S): 5000 INJECTION INTRAVENOUS; SUBCUTANEOUS at 14:19

## 2023-01-21 NOTE — PROGRESS NOTE ADULT - ATTENDING COMMENTS
This is a 82F with h/o HTN, T2DM, HFpEF (EF 65%, normal LV in 2022), breast cancer s/p R partial mastectomy, rectal carcinoma s/p resection, CAD s/p CABG who presents for 1 week of intermittent abdominal pain associated with nausea and vomiting.    1. Acute cholecystitis with choledocholithiasis   2. Uncontrolled T2DM with hyperglycemia  3. TAO on CKD 3, pre-renal  4. Chronic hydronephrosis  5. HTN  6. h/o CABG    - s/p ERCP by GI on 1/20:  large CBD stones on fluoro and relatively small sized ampulla; +small periampullary diverticula; s/p sphincterotomy + plastic CBD stent placement with successful drainage of bile/sludge. patient will need  repeat ERCP in 2-3 months.   - check urgent CT abd pelvis w contrast if patient  develops significant abd pain, fevers and chills  - diet as tolerated   - continue IV Zosyn, switch to PO cipro on discharge  - adjust insulin as patient's diet is resumed This is a 82F with h/o HTN, T2DM, HFpEF (EF 65%, normal LV in 2022), breast cancer s/p R partial mastectomy, rectal carcinoma s/p resection, CAD s/p CABG who presents for 1 week of intermittent abdominal pain associated with nausea and vomiting.    1. Acute cholecystitis with choledocholithiasis   2. Uncontrolled T2DM with hyperglycemia  3. TAO on CKD 3, pre-renal  4. Chronic hydronephrosis  5. HTN  6. h/o CABG    - s/p ERCP by GI on 1/20:  large CBD stones on fluoro and relatively small sized ampulla; +small periampullary diverticula; s/p sphincterotomy + plastic CBD stent placement with successful drainage of bile/sludge. patient will need  repeat ERCP in 2-3 months.   - check urgent CT abd pelvis w contrast if patient  develops significant abd pain, fevers and chills  - diet, advance as tolerated   - continue IV Zosyn, switch to PO cipro on discharge  - adjust insulin as patient's diet is resumed  - reassess to consider resuming diuretic tomorrow This is a 82F with h/o HTN, T2DM, HFpEF (EF 65%, normal LV in 2022), breast cancer s/p R partial mastectomy, rectal carcinoma s/p resection, CAD s/p CABG who presents for 1 week of intermittent abdominal pain associated with nausea and vomiting.    1. Acute cholecystitis with choledocholithiasis   2. Uncontrolled T2DM with hyperglycemia  3. TAO on CKD 3, pre-renal  4. Chronic hydronephrosis  5. HTN  6. h/o CABG    - s/p ERCP by GI on 1/20:  large CBD stones on fluoro and relatively small sized ampulla; +small periampullary diverticula; s/p sphincterotomy + plastic CBD stent placement with successful drainage of bile/sludge. patient will need  repeat ERCP in 2-3 months. No IR intervention planned at this time.  - check urgent CT abd pelvis w contrast if patient  develops significant abd pain, fevers and chills  - diet, advance as tolerated   - continue IV Zosyn, switch to PO cipro on discharge  - adjust insulin as patient's diet is resumed  - sCr worsening. check bladder scans. diuretic on HOLD This is a 82F with h/o HTN, T2DM, HFpEF (EF 65%, normal LV in 2022), breast cancer s/p R partial mastectomy, rectal carcinoma s/p resection, CAD s/p CABG who presents for 1 week of intermittent abdominal pain associated with nausea and vomiting.    1. Acute cholecystitis with choledocholithiasis   2. Uncontrolled T2DM with hyperglycemia  3. TAO on CKD 3, pre-renal  4. Chronic hydronephrosis  5. HTN  6. h/o CABG    - 1/21: on exam patient is dyspneic and complaining of having trouble catching her breath. pulse ox 97-97%, diffuse crackles with mild exp wheezes BL. check stat chest xray and bumex 2mg IV x1 stat. covering resident MD and RN notified at bedside. monitor closely.   - s/p ERCP by GI on 1/20:  large CBD stones on fluoro and relatively small sized ampulla; +small periampullary diverticula; s/p sphincterotomy + plastic CBD stent placement with successful drainage of bile/sludge. patient will need  repeat ERCP in 2-3 months. No IR intervention planned at this time.  - check urgent CT abd pelvis w contrast if patient  develops significant abd pain, fevers and chills  - diet, advance as tolerated   - continue IV Zosyn, switch to PO cipro on discharge  - adjust insulin as patient's diet is resumed  - sCr worsening. check bladder scans. This is a 82F with h/o HTN, T2DM, HFpEF (EF 65%, normal LV in 2022), breast cancer s/p R partial mastectomy, rectal carcinoma s/p resection, CAD s/p CABG who presents for 1 week of intermittent abdominal pain associated with nausea and vomiting.    1. Acute cholecystitis with choledocholithiasis   2. Uncontrolled T2DM with hyperglycemia  3. TAO on CKD 3, pre-renal  4. Chronic hydronephrosis  5. HTN  6. h/o CABG    - 1/21: on exam patient is dyspneic and complaining of having trouble catching her breath. pulse ox 97-97%, diffuse crackles with mild exp wheezes BL. check stat chest xray and bumex 2mg IV x1 stat. covering resident MD and RN notified at bedside. monitor closely. HOLD aldactone given soft BPs (patient already got dose earlier in AM).   - sCr worsening. check bladder scans.  - s/p ERCP by GI on 1/20:  large CBD stones on fluoro and relatively small sized ampulla; +small periampullary diverticula; s/p sphincterotomy + plastic CBD stent placement with successful drainage of bile/sludge. patient will need  repeat ERCP in 2-3 months. No IR intervention planned at this time.  - check urgent CT abd pelvis w contrast if patient  develops significant abd pain, fevers and chills  - diet, advance as tolerated   - continue IV Zosyn, switch to PO cipro on discharge  - adjust insulin as patient's diet is resumed This is a 82F with h/o HTN, T2DM, HFpEF (EF 65%, normal LV in 2022), breast cancer s/p R partial mastectomy, rectal carcinoma s/p resection, CAD s/p CABG who presents for 1 week of intermittent abdominal pain associated with nausea and vomiting.    1. Acute cholecystitis with choledocholithiasis   2. Uncontrolled T2DM with hyperglycemia  3. TAO on CKD 3, pre-renal  4. Chronic hydronephrosis  5. HTN  6. h/o CABG    - 1/21: on exam patient is dyspneic and complaining of having trouble catching her breath. pulse ox 97-97%, diffuse crackles BL. check stat chest xray and bumex 2mg IV x1 stat. covering resident MD and RN notified at bedside. monitor closely. HOLD aldactone given soft BPs (patient already got dose earlier in AM).   - sCr worsening. check bladder scans.  - s/p ERCP by GI on 1/20:  large CBD stones on fluoro and relatively small sized ampulla; +small periampullary diverticula; s/p sphincterotomy + plastic CBD stent placement with successful drainage of bile/sludge. patient will need  repeat ERCP in 2-3 months. No IR intervention planned at this time.  - check urgent CT abd pelvis w contrast if patient  develops significant abd pain, fevers and chills  - diet, advance as tolerated   - continue IV Zosyn, switch to PO cipro on discharge  - adjust insulin as patient's diet is resumed

## 2023-01-21 NOTE — PROGRESS NOTE ADULT - PROBLEM SELECTOR PLAN 4
Patient with HFpEF, follows with Dr. Hagan   - recent admission 8/2022 for ADHF and fluid overload  -  upon dc, 602 upon admission  - TTE 8/2022: EF 62%, normal LV/RV systolic function, elevated LV filling pressures, mild MR    Plan:  > continue with home meds: spironolactone 12.5 qd, metoprolol 150 qd  > hold bumex given NPO status this AM, Reassess volume status

## 2023-01-21 NOTE — PROGRESS NOTE ADULT - PROBLEM SELECTOR PLAN 3
Patient presenting with mild DKA given BHB of 2 and serum glucose of 450  - resolved, or likely was due to starvation ketosis given AG closed, pH normal, HCO3 normal, glucose improving  - adequate response to 4U regular insulin  - s/p 750 cc IV hydration, judicious hydration given HFpEF  - patient on oral agents: repaglinide and semaglutide  - upon last admission 8/2022, patient was well controlled on lantus 16U QHS and admelog 7U TID  - A1c stable at 9.6    Plan:  > continue with lantus 10U QHS  > moderate dose correctional sliding scale with meals while eating  >monitor FS and add sliding scale as needed

## 2023-01-21 NOTE — PROGRESS NOTE ADULT - ASSESSMENT
82 year old F with a PMHx of HTN, HLD, T2DM, HFpEF (EF 65%), breast cancer s/p R partial mastectomy, rectal carcinoma s/p resection, CAD s/p CABG (11/2020) who presents for 1 week of progressively worsening intermittent abdominal pain as well as N/V - found to have acute rissa, choledocho and DKA.    Impression:  #Choledocholithiasis - as seen on CT with CBD dilation. Noted normal bilirubin. Low suspicion for cholangitis at this point.   - Now s/p ERCP on 1/20 - choledocholithiasis w/ large stone w/ sphincterotomy and biliary stent placement.   - AST/ALT, alk phos and bilirubin are normal.      #Acute rissa - deemed not a surgical candidate  #DKA, elevated lactate - elevated BHB, hyperglycemia, HAGMA   #HFpEF  #DVT on eliquis   # CKD    Recommendations:   - Can resume diet as tolerated.   - Monitor CBC and CMP daily.   - Patient will need repeat ERCP in 2-3 months.   - If she develops significant abd pain w/ fevers and chills, please obtain CT A/P w/ contrast and lipase levels.     Thank you for the consult. Please call us back if you have any questions or concerns.     All recommendations are tentative until the note is attested by an attending.     Angus Covington, PGY-4  Gastroenterology/Hepatology Fellow  Available on Microsoft Teams  [phone] (Short Range Pager)  [phone] (Long Range Pager)    After 5pm, please contact the on-call GI fellow. [phone]           82 year old F with a PMHx of HTN, HLD, T2DM, HFpEF (EF 65%), breast cancer s/p R partial mastectomy, rectal carcinoma s/p resection, CAD s/p CABG (11/2020) who presents for 1 week of progressively worsening intermittent abdominal pain as well as N/V - found to have acute rissa, choledocho and DKA.    Impression:  #Choledocholithiasis - as seen on CT with CBD dilation. Noted normal bilirubin. Low suspicion for cholangitis at this point.   - Now s/p ERCP on 1/20 - choledocholithiasis w/ large stone w/ sphincterotomy and biliary stent placement.   - AST/ALT, alk phos and bilirubin are normal.      #Acute rissa - deemed not a surgical candidate  #DKA, elevated lactate - elevated BHB, hyperglycemia, HAGMA   #HFpEF  #DVT on eliquis   # CKD    Recommendations:   - Can resume diet as tolerated.   - can consider switching to PO cipro to complete the course of abx if she is getting discharged.   - Monitor CBC and CMP daily.   - Patient will need repeat ERCP in 2-3 months.   - If she develops significant abd pain w/ fevers and chills, please obtain CT A/P w/ contrast    Thank you for the consult. Please call us back if you have any questions or concerns.     All recommendations are tentative until the note is attested by an attending.     Angus Covington, PGY-4  Gastroenterology/Hepatology Fellow  Available on Microsoft Teams  [phone] (Short Range Pager)  [phone] (Long Range Pager)    After 5pm, please contact the on-call GI fellow. [phone]

## 2023-01-21 NOTE — CONSULT NOTE ADULT - ASSESSMENT
-----------------------------------------------------------  Interventional Radiology Brief Consult Note  -----------------------------------------------------------    Reason for Referral: choledocholithiasis and acute cholecystitis     Clinical Summary: 82y Female with pmh of HTN, HLD, T2DM, HFpEF (EF 65% X/2022), breast cancer s/p R partial mastectomy, rectal carcinoma s/p resection, CAD s/p CABG who presents for 1 week of intermittent abdominal pain associated with nausea and vomiting, found to have acute cholecystitis with choledocholithiasis as well as mild DKA.     Patient is now s/p ERCP with large CBD stones on fluoro and relatively small sized ampulla; +small periampullary diverticula; s/p sphincterotomy with sphictertome + plastic CBD stent placement with successful drainage of bile/sludge. Balloon extraction not done due to small sized ampulla relative to large CBD stones. GI recommendation to come back in 2-3 months for stone and stent removal.    IR is consulted for possible cholecystostomy tube placement.       Vitals:  T(F): 97.7 (01-21-23 @ 04:25), Max: 98.4 (01-20-23 @ 19:49)  HR: 76 (01-21-23 @ 04:25) (76 - 91)  BP: 111/72 (01-21-23 @ 04:25) (94/56 - 111/72)  RR: 18 (01-21-23 @ 04:25) (16 - 20)  SpO2: 95% (01-21-23 @ 04:25) (90% - 100%)    Labs:           11.5  13.38)-----(188     (01-21-23 @ 06:59)         37.6     137 | 100 | 43  --------------------< 151     (01-21-23 @ 06:59)  4.0 | 26 | 2.67       PT: 18.7<H> 01-20-23 @ 06:35  aPTT: 31.3 01-20-23 @ 06:35   INR: 1.60<H> 01-20-23 @ 06:35    Imaging: CT abdomen/pelvis and US abdomen 1/18/23 reviewed    Assessment: 82y Female with acute cholecystitis and choledocholithiasis s/p ERCP with sphincterotomy and plastic CBD stent placement. IR is consulted for percutaneous cholecystostomy tube placement.     Recommendations:  Given patient has a CBD stent in place at this time with downtrending wbc and stable vitals, recommend no IR intervention at this time. Please reach out to IR if patient condition changes/she decompensates.      -----------------------------------------------------------  Interventional Radiology Brief Consult Note  -----------------------------------------------------------    Reason for Referral: choledocholithiasis and acute cholecystitis     Clinical Summary: 82y Female with pmh of HTN, HLD, T2DM, HFpEF (EF 65% X/2022), breast cancer s/p R partial mastectomy, rectal carcinoma s/p resection, CAD s/p CABG who presents for 1 week of intermittent abdominal pain associated with nausea and vomiting, found to have acute cholecystitis with choledocholithiasis as well as mild DKA.     Patient is now s/p ERCP with large CBD stones on fluoro and relatively small sized ampulla; +small periampullary diverticula; s/p sphincterotomy with sphictertome + plastic CBD stent placement with successful drainage of bile/sludge. Balloon extraction not done due to small sized ampulla relative to large CBD stones. GI recommendation to come back in 2-3 months for stone and stent removal.    IR is consulted for possible cholecystostomy tube placement.       Vitals:  T(F): 97.7 (01-21-23 @ 04:25), Max: 98.4 (01-20-23 @ 19:49)  HR: 76 (01-21-23 @ 04:25) (76 - 91)  BP: 111/72 (01-21-23 @ 04:25) (94/56 - 111/72)  RR: 18 (01-21-23 @ 04:25) (16 - 20)  SpO2: 95% (01-21-23 @ 04:25) (90% - 100%)    Labs:           11.5  13.38)-----(188     (01-21-23 @ 06:59)         37.6     137 | 100 | 43  --------------------< 151     (01-21-23 @ 06:59)  4.0 | 26 | 2.67       PT: 18.7<H> 01-20-23 @ 06:35  aPTT: 31.3 01-20-23 @ 06:35   INR: 1.60<H> 01-20-23 @ 06:35    Imaging: CT abdomen/pelvis and US abdomen 1/18/23 reviewed    Assessment: 82y Female with acute cholecystitis and choledocholithiasis s/p ERCP with sphincterotomy and plastic CBD stent placement. IR is consulted for percutaneous cholecystostomy tube placement.     Recommendations:  Given patient has a CBD stent in place at this time with downtrending wbc and stable vitals, recommend no IR intervention at this time. Please reach out to IR if patient condition changes/she decompensates.     --  Mera Narvaez MD  Interventional Radiology Resident (PGY-4)  Available on [employer] TEAMS    For EMERGENT inquiries/questions:  IR Pager ([facility]): [phone]  IR Pager ([facility]): [phone] ; [phone]    For non-emergent consults/questions:   Please place a sunrise order "Consult- Interventional Radiology" with an appropriate callback number    For questions about scheduling during appropriate work hours, call IR :  [facility]: [phone]  [facility]: [phone]    For outpatient IR booking:  [facility]: [phone]  [facility]: [phone]

## 2023-01-21 NOTE — PROGRESS NOTE ADULT - PROBLEM SELECTOR PLAN 6
Diet: Clear liquid, advance as tolerated   DVT PPx: heparin subq   Dispo: pending clinical improvement

## 2023-01-21 NOTE — PROGRESS NOTE ADULT - ATTENDING COMMENTS
82F with choledocholithiasis  S/p ERCP with stent placement  Doing ok today    Will need repeat ERCP as an outpatient in 2-3mo for stent and stone removal    Thank you for this interesting consult.  Please call the advanced GI service with any questions or concerns.

## 2023-01-21 NOTE — PROGRESS NOTE ADULT - ASSESSMENT
acetaminophen     Tablet .. 650 milliGRAM(s) Oral every 6 hours PRN  aluminum hydroxide/magnesium hydroxide/simethicone Suspension 30 milliLiter(s) Oral every 6 hours PRN  aspirin  chewable 81 milliGRAM(s) Oral daily  atorvastatin 40 milliGRAM(s) Oral at bedtime  chlorhexidine 4% Liquid 1 Application(s) Topical <User Schedule>  dextrose 5%. 1000 milliLiter(s) IV Continuous <Continuous>  dextrose 5%. 1000 milliLiter(s) IV Continuous <Continuous>  dextrose 50% Injectable 25 Gram(s) IV Push once  dextrose 50% Injectable 12.5 Gram(s) IV Push once  dextrose 50% Injectable 25 Gram(s) IV Push once  dextrose Oral Gel 15 Gram(s) Oral once PRN  gabapentin 200 milliGRAM(s) Oral three times a day  glucagon  Injectable 1 milliGRAM(s) IntraMuscular once  heparin   Injectable 5000 Unit(s) SubCutaneous every 8 hours  indomethacin Suppository 100 milliGRAM(s) Rectal once  insulin glargine Injectable (LANTUS) 10 Unit(s) SubCutaneous at bedtime  insulin lispro (ADMELOG) corrective regimen sliding scale   SubCutaneous Before meals and at bedtime  metoprolol succinate  milliGRAM(s) Oral daily  morphine  - Injectable 1 milliGRAM(s) IV Push every 6 hours PRN  piperacillin/tazobactam IVPB.. 3.375 Gram(s) IV Intermittent every 8 hours  sodium chloride 0.9% lock flush 10 milliLiter(s) IV Push every 1 hour PRN  spironolactone 12.5 milliGRAM(s) Oral daily      VITAL:  T(C): , Max: 37.3 (01-21-23 @ 13:21)  T(F): , Max: 99.1 (01-21-23 @ 13:21)  HR: 88 (01-21-23 @ 13:21)  BP: 100/60 (01-21-23 @ 13:21)  BP(mean): --  RR: 18 (01-21-23 @ 13:21)  SpO2: 96% (01-21-23 @ 13:21)  Wt(kg): --    01-20-23 @ 07:01  -  01-21-23 @ 07:00  --------------------------------------------------------  IN: 440 mL / OUT: 50 mL / NET: 390 mL    01-21-23 @ 07:01  -  01-21-23 @ 14:41  --------------------------------------------------------  IN: 850 mL / OUT: 250 mL / NET: 600 mL        PHYSICAL EXAM:  General: NAD; Alert  HEENT:  NCAT; PERRLA  Neck: No JVD; supple  Respiratory: CTA-b/l  Cardiac: RRR s1s2  Gastrointestinal: BS+, soft, NT/ND  Urologic: No chen  Extremities: No peripheral edema  Access:     LABS:                          11.5   13.38 )-----------( 188      ( 21 Jan 2023 06:59 )             37.6     Na(137)/K(4.0)/Cl(100)/HCO3(26)/BUN(43)/Cr(2.67)Glu(151)/Ca(8.7)/Mg(1.9)/PO4(3.8)    01-21 @ 06:59  Na(138)/K(4.1)/Cl(101)/HCO3(26)/BUN(38)/Cr(2.44)Glu(145)/Ca(9.0)/Mg(1.9)/PO4(3.5)    01-20 @ 06:35  Na(139)/K(4.1)/Cl(100)/HCO3(26)/BUN(26)/Cr(1.62)Glu(203)/Ca(9.4)/Mg(--)/PO4(--)    01-19 @ 06:58  Na(136)/K(4.2)/Cl(98)/HCO3(24)/BUN(31)/Cr(1.58)Glu(323)/Ca(9.4)/Mg(--)/PO4(--)    01-18 @ 16:14      Sodium, Random Urine: 27 mmol/L (01-21 @ 06:50)  Osmolality, Random Urine: 368 mos/kg (01-21 @ 06:50)  Potassium, Random Urine: 53 mmol/L (01-21 @ 06:50)        ASSESSMENT/PLAN  Patient is a 80 year old F with a PMHx of HTN, HLD, T2DM, HFpEF (EF 65% X/2022), breast cancer s/p R partial mastectomy, rectal carcinoma s/p resection, CAD s/p CABG (11/2020) who presents for 1 day of progressively worsening abdominal pain associated with nausea and NBNB vomiting. 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  3)ID:on IV abx  4) surgery on board , conservative management   IR on board likely  perc (cholecystostomy drainage)        Yevgeniy Castelan NP-BC  [employer]  [phone]   on room air  afebrile  NAD  updated daughter at bedside    acetaminophen     Tablet .. 650 milliGRAM(s) Oral every 6 hours PRN  aluminum hydroxide/magnesium hydroxide/simethicone Suspension 30 milliLiter(s) Oral every 6 hours PRN  aspirin  chewable 81 milliGRAM(s) Oral daily  atorvastatin 40 milliGRAM(s) Oral at bedtime  chlorhexidine 4% Liquid 1 Application(s) Topical <User Schedule>  dextrose 5%. 1000 milliLiter(s) IV Continuous <Continuous>  dextrose 5%. 1000 milliLiter(s) IV Continuous <Continuous>  dextrose 50% Injectable 25 Gram(s) IV Push once  dextrose 50% Injectable 12.5 Gram(s) IV Push once  dextrose 50% Injectable 25 Gram(s) IV Push once  dextrose Oral Gel 15 Gram(s) Oral once PRN  gabapentin 200 milliGRAM(s) Oral three times a day  glucagon  Injectable 1 milliGRAM(s) IntraMuscular once  heparin   Injectable 5000 Unit(s) SubCutaneous every 8 hours  indomethacin Suppository 100 milliGRAM(s) Rectal once  insulin glargine Injectable (LANTUS) 10 Unit(s) SubCutaneous at bedtime  insulin lispro (ADMELOG) corrective regimen sliding scale   SubCutaneous Before meals and at bedtime  metoprolol succinate  milliGRAM(s) Oral daily  morphine  - Injectable 1 milliGRAM(s) IV Push every 6 hours PRN  piperacillin/tazobactam IVPB.. 3.375 Gram(s) IV Intermittent every 8 hours  sodium chloride 0.9% lock flush 10 milliLiter(s) IV Push every 1 hour PRN  spironolactone 12.5 milliGRAM(s) Oral daily      VITAL:  T(C): , Max: 37.3 (01-21-23 @ 13:21)  T(F): , Max: 99.1 (01-21-23 @ 13:21)  HR: 88 (01-21-23 @ 13:21)  BP: 100/60 (01-21-23 @ 13:21)  BP(mean): --  RR: 18 (01-21-23 @ 13:21)  SpO2: 96% (01-21-23 @ 13:21)  Wt(kg): --    01-20-23 @ 07:01  -  01-21-23 @ 07:00  --------------------------------------------------------  IN: 440 mL / OUT: 50 mL / NET: 390 mL    01-21-23 @ 07:01  -  01-21-23 @ 14:41  --------------------------------------------------------  IN: 850 mL / OUT: 250 mL / NET: 600 mL        PHYSICAL EXAM:  General: NAD; Alert  HEENT:  NCAT; PERRLA  Neck: No JVD; supple  Respiratory: CTA-b/l  Cardiac: RRR s1s2  Gastrointestinal: BS+, soft, NT/ND  Urologic: No chen  Extremities: No peripheral edema  Access:     LABS:                          11.5   13.38 )-----------( 188      ( 21 Jan 2023 06:59 )             37.6     Na(137)/K(4.0)/Cl(100)/HCO3(26)/BUN(43)/Cr(2.67)Glu(151)/Ca(8.7)/Mg(1.9)/PO4(3.8)    01-21 @ 06:59  Na(138)/K(4.1)/Cl(101)/HCO3(26)/BUN(38)/Cr(2.44)Glu(145)/Ca(9.0)/Mg(1.9)/PO4(3.5)    01-20 @ 06:35  Na(139)/K(4.1)/Cl(100)/HCO3(26)/BUN(26)/Cr(1.62)Glu(203)/Ca(9.4)/Mg(--)/PO4(--)    01-19 @ 06:58  Na(136)/K(4.2)/Cl(98)/HCO3(24)/BUN(31)/Cr(1.58)Glu(323)/Ca(9.4)/Mg(--)/PO4(--)    01-18 @ 16:14      Sodium, Random Urine: 27 mmol/L (01-21 @ 06:50)  Osmolality, Random Urine: 368 mos/kg (01-21 @ 06:50)  Potassium, Random Urine: 53 mmol/L (01-21 @ 06:50)        ASSESSMENT/PLAN  Patient is a 80 year old F with a PMHx of HTN, HLD, T2DM, HFpEF (EF 65% X/2022), breast cancer s/p R partial mastectomy, rectal carcinoma s/p resection, CAD s/p CABG (11/2020) who presents for 1 day of progressively worsening abdominal pain associated with nausea and NBNB vomiting. 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  3)ID:on IV abx  4) surgery on board , conservative management   IR on board likely - no IR intervention  at present per  IR annette Castelan, NP-BC  [employer]  [phone]

## 2023-01-21 NOTE — PROGRESS NOTE ADULT - ATTENDING COMMENTS
date of service 1/21/23    pt seen and examined  pt chart and imaging reviewed in detail  agree with note above  82F with RUQ pain c/s acute cholecystitis and choledocolithiasis with significant co-morbidities including CAD, s/p CABG, CHF, poorly controlled DM  pt is poor operative candidate  resting in bed, NAD  anicteric  softly distended, +RUQ TTP no r/g  cont npo, ivf , iv abx  serial abd exams  f/u labs in am  f/u IR evaluation for possible perc cholecystostomy drainage  appreciate GI input s/p EUS/ERCP yesterday   f/u cards for risk stratification  f/u endo for better FS control  cont supportive care per med  will follow with you  d/w pt & family / RN @ bedside in detail .

## 2023-01-21 NOTE — PROGRESS NOTE ADULT - SUBJECTIVE AND OBJECTIVE BOX
PROGRESS NOTE:    Hero Babin MD  Internal Medicine PGY-2  Available on [employer] Teams      Patient is a 82y old  Female who presents with a chief complaint of acute cholecystitis, DKA (20 Jan 2023 14:00)      SUBJECTIVE / OVERNIGHT EVENTS: No acute overnight events. Pt seen and examined. Denies fevers, chills, CP, SOB, Abdominal pain, N/V, Constipation, Diarrhea      MEDICATIONS  (STANDING):  aspirin  chewable 81 milliGRAM(s) Oral daily  atorvastatin 40 milliGRAM(s) Oral at bedtime  chlorhexidine 4% Liquid 1 Application(s) Topical <User Schedule>  dextrose 5%. 1000 milliLiter(s) (100 mL/Hr) IV Continuous <Continuous>  dextrose 5%. 1000 milliLiter(s) (50 mL/Hr) IV Continuous <Continuous>  dextrose 50% Injectable 25 Gram(s) IV Push once  dextrose 50% Injectable 12.5 Gram(s) IV Push once  dextrose 50% Injectable 25 Gram(s) IV Push once  gabapentin 200 milliGRAM(s) Oral three times a day  glucagon  Injectable 1 milliGRAM(s) IntraMuscular once  heparin   Injectable 5000 Unit(s) SubCutaneous every 8 hours  indomethacin Suppository 100 milliGRAM(s) Rectal once  insulin glargine Injectable (LANTUS) 10 Unit(s) SubCutaneous at bedtime  insulin lispro (ADMELOG) corrective regimen sliding scale   SubCutaneous Before meals and at bedtime  metoprolol succinate  milliGRAM(s) Oral daily  piperacillin/tazobactam IVPB.. 3.375 Gram(s) IV Intermittent every 8 hours  sodium chloride 0.9%. 1000 milliLiter(s) (70 mL/Hr) IV Continuous <Continuous>  sodium chloride 0.9%. 1000 milliLiter(s) (75 mL/Hr) IV Continuous <Continuous>  spironolactone 12.5 milliGRAM(s) Oral daily    MEDICATIONS  (PRN):  acetaminophen     Tablet .. 650 milliGRAM(s) Oral every 6 hours PRN Moderate Pain (4 - 6)  aluminum hydroxide/magnesium hydroxide/simethicone Suspension 30 milliLiter(s) Oral every 6 hours PRN Dyspepsia  dextrose Oral Gel 15 Gram(s) Oral once PRN Blood Glucose LESS THAN 70 milliGRAM(s)/deciliter  morphine  - Injectable 1 milliGRAM(s) IV Push every 6 hours PRN Severe Pain (7 - 10)  sodium chloride 0.9% lock flush 10 milliLiter(s) IV Push every 1 hour PRN Pre/post blood products, medications, blood draw, and to maintain line patency      I&O's Summary    19 Jan 2023 07:01  -  20 Jan 2023 07:00  --------------------------------------------------------  IN: 750 mL / OUT: 900 mL / NET: -150 mL    20 Jan 2023 07:01  -  21 Jan 2023 06:59  --------------------------------------------------------  IN: 140 mL / OUT: 50 mL / NET: 90 mL        Vital Signs Last 24 Hrs  T(C): 36.5 (21 Jan 2023 04:25), Max: 36.9 (20 Jan 2023 08:35)  T(F): 97.7 (21 Jan 2023 04:25), Max: 98.5 (20 Jan 2023 08:35)  HR: 76 (21 Jan 2023 04:25) (76 - 91)  BP: 111/72 (21 Jan 2023 04:25) (94/56 - 111/72)  BP(mean): --  RR: 18 (21 Jan 2023 04:25) (16 - 20)  SpO2: 95% (21 Jan 2023 04:25) (90% - 100%)    Parameters below as of 21 Jan 2023 04:25  Patient On (Oxygen Delivery Method): nasal cannula  O2 Flow (L/min): 2      =================PHYSICAL EXAM=================    GENERAL: NAD, lying in bed comfortably  HEAD:  Atraumatic, Normocephalic  EYES: EOMI, PERRLA, conjunctiva and sclera clear  ENT: Moist mucous membranes  NECK: Supple, No JVD  CHEST/LUNG: Clear to auscultation bilaterally; No rales, rhonchi, wheezing, or rubs. Unlabored respirations  HEART: Regular rate and rhythm; No murmurs, rubs, or gallops  ABDOMEN: Bowel sounds present; Soft, Nontender, Nondistended. No hepatomegally  EXTREMITIES:  2+ Peripheral Pulses, brisk capillary refill. No clubbing, cyanosis, or edema  NERVOUS SYSTEM:  Alert & Oriented X3, speech clear. No deficits   MSK: FROM all 4 extremities, full and equal strength  SKIN: No rashes or lesions    =================================================    LABS:                        11.7   16.59 )-----------( 230      ( 20 Jan 2023 06:35 )             37.7     Auto Eosinophil # x     / Auto Eosinophil % x     / Auto Neutrophil # x     / Auto Neutrophil % x     / BANDS % x        01-20    138  |  101  |  38<H>  ----------------------------<  145<H>  4.1   |  26  |  2.44<H>    Ca    9.0      20 Jan 2023 06:35  Mg     1.9     01-20  Phos  3.5     01-20  TPro  5.9<L>  /  Alb  2.9<L>  /  TBili  0.7  /  DBili  x   /  AST  21  /  ALT  28  /  AlkPhos  78  01-20    PT/INR - ( 20 Jan 2023 06:35 )   PT: 18.7 sec;   INR: 1.60 ratio         PTT - ( 20 Jan 2023 06:35 )  PTT:31.3 sec        Lactate, Blood: 1.7 mmol/L (01-19 @ 06:43)        RADIOLOGY & ADDITIONAL TESTS:    Imaging Personally Reviewed:    Consultant(s) Notes Reviewed:      Care Discussed with Consultants/Other Providers:   PROGRESS NOTE:    Hero Babin MD  Internal Medicine PGY-2  Available on [employer] Teams      Patient is a 82y old  Female who presents with a chief complaint of acute cholecystitis, DKA (20 Jan 2023 14:00)      SUBJECTIVE / OVERNIGHT EVENTS: No acute overnight events. Pt seen and examined.  #[id number] provided translation. Patient states that she feels fine. She reports that she is hungry.  Denies fevers, chills, CP, SOB, Abdominal pain, N/V, Constipation, Diarrhea      MEDICATIONS  (STANDING):  aspirin  chewable 81 milliGRAM(s) Oral daily  atorvastatin 40 milliGRAM(s) Oral at bedtime  chlorhexidine 4% Liquid 1 Application(s) Topical <User Schedule>  dextrose 5%. 1000 milliLiter(s) (100 mL/Hr) IV Continuous <Continuous>  dextrose 5%. 1000 milliLiter(s) (50 mL/Hr) IV Continuous <Continuous>  dextrose 50% Injectable 25 Gram(s) IV Push once  dextrose 50% Injectable 12.5 Gram(s) IV Push once  dextrose 50% Injectable 25 Gram(s) IV Push once  gabapentin 200 milliGRAM(s) Oral three times a day  glucagon  Injectable 1 milliGRAM(s) IntraMuscular once  heparin   Injectable 5000 Unit(s) SubCutaneous every 8 hours  indomethacin Suppository 100 milliGRAM(s) Rectal once  insulin glargine Injectable (LANTUS) 10 Unit(s) SubCutaneous at bedtime  insulin lispro (ADMELOG) corrective regimen sliding scale   SubCutaneous Before meals and at bedtime  metoprolol succinate  milliGRAM(s) Oral daily  piperacillin/tazobactam IVPB.. 3.375 Gram(s) IV Intermittent every 8 hours  sodium chloride 0.9%. 1000 milliLiter(s) (70 mL/Hr) IV Continuous <Continuous>  sodium chloride 0.9%. 1000 milliLiter(s) (75 mL/Hr) IV Continuous <Continuous>  spironolactone 12.5 milliGRAM(s) Oral daily    MEDICATIONS  (PRN):  acetaminophen     Tablet .. 650 milliGRAM(s) Oral every 6 hours PRN Moderate Pain (4 - 6)  aluminum hydroxide/magnesium hydroxide/simethicone Suspension 30 milliLiter(s) Oral every 6 hours PRN Dyspepsia  dextrose Oral Gel 15 Gram(s) Oral once PRN Blood Glucose LESS THAN 70 milliGRAM(s)/deciliter  morphine  - Injectable 1 milliGRAM(s) IV Push every 6 hours PRN Severe Pain (7 - 10)  sodium chloride 0.9% lock flush 10 milliLiter(s) IV Push every 1 hour PRN Pre/post blood products, medications, blood draw, and to maintain line patency      I&O's Summary    19 Jan 2023 07:01  -  20 Jan 2023 07:00  --------------------------------------------------------  IN: 750 mL / OUT: 900 mL / NET: -150 mL    20 Jan 2023 07:01  -  21 Jan 2023 06:59  --------------------------------------------------------  IN: 140 mL / OUT: 50 mL / NET: 90 mL        Vital Signs Last 24 Hrs  T(C): 36.5 (21 Jan 2023 04:25), Max: 36.9 (20 Jan 2023 08:35)  T(F): 97.7 (21 Jan 2023 04:25), Max: 98.5 (20 Jan 2023 08:35)  HR: 76 (21 Jan 2023 04:25) (76 - 91)  BP: 111/72 (21 Jan 2023 04:25) (94/56 - 111/72)  BP(mean): --  RR: 18 (21 Jan 2023 04:25) (16 - 20)  SpO2: 95% (21 Jan 2023 04:25) (90% - 100%)    Parameters below as of 21 Jan 2023 04:25  Patient On (Oxygen Delivery Method): nasal cannula  O2 Flow (L/min): 2      =================PHYSICAL EXAM=================    GENERAL: Older woman in NAD, lying in bed comfortably  ENT: Moist mucous membranes  NECK: Supple   CHEST/LUNG: Clear to auscultation bilaterally; mild crackles, No rhonchi, wheezing, or rubs. Unlabored respirations  HEART: Regular rate and rhythm; No murmurs, rubs, or gallops  ABDOMEN: Bowel sounds present; Soft, Nontender, Nondistended. No hepatomegally  EXTREMITIES:  2+ Peripheral Pulses, brisk capillary refill. No clubbing, cyanosis, or edema  NERVOUS SYSTEM:  Alert & Oriented X3, speech clear. No deficits   MSK: FROM all 4 extremities, full and equal strength  SKIN: No rashes or lesions    =================================================    LABS:                        11.7   16.59 )-----------( 230      ( 20 Jan 2023 06:35 )             37.7     Auto Eosinophil # x     / Auto Eosinophil % x     / Auto Neutrophil # x     / Auto Neutrophil % x     / BANDS % x        01-20    138  |  101  |  38<H>  ----------------------------<  145<H>  4.1   |  26  |  2.44<H>    Ca    9.0      20 Jan 2023 06:35  Mg     1.9     01-20  Phos  3.5     01-20  TPro  5.9<L>  /  Alb  2.9<L>  /  TBili  0.7  /  DBili  x   /  AST  21  /  ALT  28  /  AlkPhos  78  01-20    PT/INR - ( 20 Jan 2023 06:35 )   PT: 18.7 sec;   INR: 1.60 ratio         PTT - ( 20 Jan 2023 06:35 )  PTT:31.3 sec        Lactate, Blood: 1.7 mmol/L (01-19 @ 06:43)        RADIOLOGY & ADDITIONAL TESTS:    Imaging Personally Reviewed:    Consultant(s) Notes Reviewed:      Care Discussed with Consultants/Other Providers:

## 2023-01-21 NOTE — PROGRESS NOTE ADULT - PROBLEM SELECTOR PLAN 1
Patient presenting with 1 week of abdominal pain, nausea, and vomiting  - CT A/P: acute cholecystitis with 1.2 cm common bile duct dilatation  - per surgery, not a surgical candidate given comorbidities   > s/p ERCP : choledocholithiasis w/ large stone w/ sphincterotomy and biliary stent placement.     Plan:    > IR recs appreciated, no need for IR intervention as patient is improving   > continue with zosyn for 7 day course   > pain/nausea control with tylenol and maalox  > f/u AM lipase levels   > AVOID zofran given QTc 518  > trend CMP, coags, otherwise stable  > advance diet as tolerated   > Patient will need repeat ERCP in 2-3 months.   - If she develops significant abd pain w/ fevers and chills, please obtain CT A/P w/ contrast and lipase levels.

## 2023-01-21 NOTE — PROGRESS NOTE ADULT - ASSESSMENT
82F with RUQ pain c/s acute cholecystitis and choledocholithiasis with significant co-morbidities including CAD, s/p CABG, CHF, poorly controlled DM, s/p ERCP (1/20). Doing ok, still poor operative candidate.   - Continue IVF, iv abx  - Trend labs in am  - F/u IR evaluation for possible perc cholecystostomy drainage  - F/u GI recs  - F/u cards for risk stratification  - F/u endo for better FS control  - Cont supportive care per med

## 2023-01-21 NOTE — PROGRESS NOTE ADULT - SUBJECTIVE AND OBJECTIVE BOX
Gastroenterology/Hepatology Progress Note      Interval Events:     Allergies:  CT scan dye (Hives)  penicillin (Unknown)      Hospital Medications:  acetaminophen     Tablet .. 650 milliGRAM(s) Oral every 6 hours PRN  aluminum hydroxide/magnesium hydroxide/simethicone Suspension 30 milliLiter(s) Oral every 6 hours PRN  aspirin  chewable 81 milliGRAM(s) Oral daily  atorvastatin 40 milliGRAM(s) Oral at bedtime  chlorhexidine 4% Liquid 1 Application(s) Topical <User Schedule>  dextrose 5%. 1000 milliLiter(s) IV Continuous <Continuous>  dextrose 5%. 1000 milliLiter(s) IV Continuous <Continuous>  dextrose 50% Injectable 25 Gram(s) IV Push once  dextrose 50% Injectable 12.5 Gram(s) IV Push once  dextrose 50% Injectable 25 Gram(s) IV Push once  dextrose Oral Gel 15 Gram(s) Oral once PRN  gabapentin 200 milliGRAM(s) Oral three times a day  glucagon  Injectable 1 milliGRAM(s) IntraMuscular once  heparin   Injectable 5000 Unit(s) SubCutaneous every 8 hours  indomethacin Suppository 100 milliGRAM(s) Rectal once  insulin glargine Injectable (LANTUS) 10 Unit(s) SubCutaneous at bedtime  insulin lispro (ADMELOG) corrective regimen sliding scale   SubCutaneous Before meals and at bedtime  metoprolol succinate  milliGRAM(s) Oral daily  morphine  - Injectable 1 milliGRAM(s) IV Push every 6 hours PRN  piperacillin/tazobactam IVPB.. 3.375 Gram(s) IV Intermittent every 8 hours  sodium chloride 0.9% lock flush 10 milliLiter(s) IV Push every 1 hour PRN  sodium chloride 0.9%. 1000 milliLiter(s) IV Continuous <Continuous>  sodium chloride 0.9%. 1000 milliLiter(s) IV Continuous <Continuous>  spironolactone 12.5 milliGRAM(s) Oral daily      ROS: 14 point ROS negative unless otherwise state in subjective    PHYSICAL EXAM:   Vital Signs:  Vital Signs Last 24 Hrs  T(C): 36.5 (21 Jan 2023 04:25), Max: 36.9 (20 Jan 2023 08:35)  T(F): 97.7 (21 Jan 2023 04:25), Max: 98.5 (20 Jan 2023 08:35)  HR: 76 (21 Jan 2023 04:25) (76 - 91)  BP: 111/72 (21 Jan 2023 04:25) (94/56 - 111/72)  BP(mean): --  RR: 18 (21 Jan 2023 04:25) (16 - 20)  SpO2: 95% (21 Jan 2023 04:25) (90% - 100%)    Parameters below as of 21 Jan 2023 04:25  Patient On (Oxygen Delivery Method): nasal cannula  O2 Flow (L/min): 2    Daily     Daily     GENERAL:  No acute distress  HEENT:  NCAT, no scleral icterus  CHEST: no resp distress  HEART:  RRR  ABDOMEN:  Soft, non-tender, non-distended, normoactive bowel sounds, no masses  EXTREMITIES:  No cyanosis, clubbing, or edema  SKIN:  No rash/erythema/ecchymoses/petechiae/wounds/abscess/warm/dry  NEURO:  Alert and oriented x 3, no asterixis, no tremor    LABS:                        11.5   13.38 )-----------( 188      ( 21 Jan 2023 06:59 )             37.6     Mean Cell Volume: 96.4 fl (01-21-23 @ 06:59)    01-20    138  |  101  |  38<H>  ----------------------------<  145<H>  4.1   |  26  |  2.44<H>    Ca    9.0      20 Jan 2023 06:35  Phos  3.5     01-20  Mg     1.9     01-20    TPro  5.9<L>  /  Alb  2.9<L>  /  TBili  0.7  /  DBili  x   /  AST  21  /  ALT  28  /  AlkPhos  78  01-20    LIVER FUNCTIONS - ( 20 Jan 2023 06:35 )  Alb: 2.9 g/dL / Pro: 5.9 g/dL / ALK PHOS: 78 U/L / ALT: 28 U/L / AST: 21 U/L / GGT: x           PT/INR - ( 20 Jan 2023 06:35 )   PT: 18.7 sec;   INR: 1.60 ratio         PTT - ( 20 Jan 2023 06:35 )  PTT:31.3 sec          Imaging:           Gastroenterology/Hepatology Progress Note      Interval Events:     Pt. is Malaysian speaking,  was used. Reported that she has no abd pain, nausea and vomiting. Tolerating po diet well.     Allergies:  CT scan dye (Hives)  penicillin (Unknown)      Hospital Medications:  acetaminophen     Tablet .. 650 milliGRAM(s) Oral every 6 hours PRN  aluminum hydroxide/magnesium hydroxide/simethicone Suspension 30 milliLiter(s) Oral every 6 hours PRN  aspirin  chewable 81 milliGRAM(s) Oral daily  atorvastatin 40 milliGRAM(s) Oral at bedtime  chlorhexidine 4% Liquid 1 Application(s) Topical <User Schedule>  dextrose 5%. 1000 milliLiter(s) IV Continuous <Continuous>  dextrose 5%. 1000 milliLiter(s) IV Continuous <Continuous>  dextrose 50% Injectable 25 Gram(s) IV Push once  dextrose 50% Injectable 12.5 Gram(s) IV Push once  dextrose 50% Injectable 25 Gram(s) IV Push once  dextrose Oral Gel 15 Gram(s) Oral once PRN  gabapentin 200 milliGRAM(s) Oral three times a day  glucagon  Injectable 1 milliGRAM(s) IntraMuscular once  heparin   Injectable 5000 Unit(s) SubCutaneous every 8 hours  indomethacin Suppository 100 milliGRAM(s) Rectal once  insulin glargine Injectable (LANTUS) 10 Unit(s) SubCutaneous at bedtime  insulin lispro (ADMELOG) corrective regimen sliding scale   SubCutaneous Before meals and at bedtime  metoprolol succinate  milliGRAM(s) Oral daily  morphine  - Injectable 1 milliGRAM(s) IV Push every 6 hours PRN  piperacillin/tazobactam IVPB.. 3.375 Gram(s) IV Intermittent every 8 hours  sodium chloride 0.9% lock flush 10 milliLiter(s) IV Push every 1 hour PRN  sodium chloride 0.9%. 1000 milliLiter(s) IV Continuous <Continuous>  sodium chloride 0.9%. 1000 milliLiter(s) IV Continuous <Continuous>  spironolactone 12.5 milliGRAM(s) Oral daily      ROS: 14 point ROS negative unless otherwise state in subjective    PHYSICAL EXAM:   Vital Signs:  Vital Signs Last 24 Hrs  T(C): 36.5 (21 Jan 2023 04:25), Max: 36.9 (20 Jan 2023 08:35)  T(F): 97.7 (21 Jan 2023 04:25), Max: 98.5 (20 Jan 2023 08:35)  HR: 76 (21 Jan 2023 04:25) (76 - 91)  BP: 111/72 (21 Jan 2023 04:25) (94/56 - 111/72)  BP(mean): --  RR: 18 (21 Jan 2023 04:25) (16 - 20)  SpO2: 95% (21 Jan 2023 04:25) (90% - 100%)    Parameters below as of 21 Jan 2023 04:25  Patient On (Oxygen Delivery Method): nasal cannula  O2 Flow (L/min): 2    Daily     Daily     GENERAL:  No acute distress, overweight female, lying in bed.   HEENT:  NCAT, no scleral icterus  CHEST: no resp distress  ABDOMEN:  Soft, non-tender, non-distended, no masses  EXTREMITIES:  No LE edema  NEURO:  Alert and oriented x 3, no tremor    LABS:                        11.5   13.38 )-----------( 188      ( 21 Jan 2023 06:59 )             37.6     Mean Cell Volume: 96.4 fl (01-21-23 @ 06:59)    01-20    138  |  101  |  38<H>  ----------------------------<  145<H>  4.1   |  26  |  2.44<H>    Ca    9.0      20 Jan 2023 06:35  Phos  3.5     01-20  Mg     1.9     01-20    TPro  5.9<L>  /  Alb  2.9<L>  /  TBili  0.7  /  DBili  x   /  AST  21  /  ALT  28  /  AlkPhos  78  01-20    LIVER FUNCTIONS - ( 20 Jan 2023 06:35 )  Alb: 2.9 g/dL / Pro: 5.9 g/dL / ALK PHOS: 78 U/L / ALT: 28 U/L / AST: 21 U/L / GGT: x           PT/INR - ( 20 Jan 2023 06:35 )   PT: 18.7 sec;   INR: 1.60 ratio         PTT - ( 20 Jan 2023 06:35 )  PTT:31.3 sec          Imaging:    ERCP on 1/20    Impression:          EGD:                       - Small periampullary diverticulum                       EUS:                       - Choledocholithiasis in a mildly dilated common bile duct.                       - Cholelithiasis                       ERCP:                       - Choledocholithiasis was found, with at least one large stone. Biliary                        sphincterotomy performed, but limited size due to periampullary diverticulum.                        10 Fr x 5 cm biliary stent placed.

## 2023-01-21 NOTE — PROGRESS NOTE ADULT - PROBLEM SELECTOR PLAN 2
CKD stage 3 ---  1.5--1.7 mg/dl   SCr worsening likely 2/2 hypotension  -f/u PM BMP   -f/u UA  -f/u Renal US   -reassess volume status and consider IVF CKD stage 3 ---  1.5--1.7 mg/dl   SCr worsening likely 2/2 hypotension  -f/u PM BMP   -f/u UA  -f/u Renal US   -f/u bladder scan   -reassess volume status and consider IVF

## 2023-01-21 NOTE — PROGRESS NOTE ADULT - SUBJECTIVE AND OBJECTIVE BOX
Cardiovascular Disease Progress Note  Date of service: 01-21-23 @ 09:23    Overnight events: No acute events overnight.  Pt is in no distress.   Otherwise review of systems negative    Objective Findings:  T(C): 36.5 (01-21-23 @ 04:25), Max: 36.9 (01-20-23 @ 12:38)  HR: 76 (01-21-23 @ 04:25) (76 - 91)  BP: 111/72 (01-21-23 @ 04:25) (94/56 - 111/72)  RR: 18 (01-21-23 @ 04:25) (16 - 20)  SpO2: 95% (01-21-23 @ 04:25) (90% - 100%)  Wt(kg): --  Daily     Daily       Physical Exam:  Gen: NAD; Patient resting comfortably  HEENT: EOMI, Normocephalic/ atraumatic  CV: RRR, normal S1 + S2, no m/r/g  Lungs:  Normal respiratory effort; clear to auscultation bilaterally  Abd: soft, non-tender; bowel sounds present  Ext: No edema; warm and well perfused    Telemetry:  N/a    Laboratory Data:                        11.5   13.38 )-----------( 188      ( 21 Jan 2023 06:59 )             37.6     01-21    137  |  100  |  43<H>  ----------------------------<  151<H>  4.0   |  26  |  2.67<H>    Ca    8.7      21 Jan 2023 06:59  Phos  3.8     01-21  Mg     1.9     01-21    TPro  6.0  /  Alb  2.7<L>  /  TBili  0.5  /  DBili  x   /  AST  14  /  ALT  20  /  AlkPhos  81  01-21    PT/INR - ( 20 Jan 2023 06:35 )   PT: 18.7 sec;   INR: 1.60 ratio         PTT - ( 20 Jan 2023 06:35 )  PTT:31.3 sec          Inpatient Medications:  MEDICATIONS  (STANDING):  aspirin  chewable 81 milliGRAM(s) Oral daily  atorvastatin 40 milliGRAM(s) Oral at bedtime  chlorhexidine 4% Liquid 1 Application(s) Topical <User Schedule>  dextrose 5%. 1000 milliLiter(s) (100 mL/Hr) IV Continuous <Continuous>  dextrose 5%. 1000 milliLiter(s) (50 mL/Hr) IV Continuous <Continuous>  dextrose 50% Injectable 25 Gram(s) IV Push once  dextrose 50% Injectable 12.5 Gram(s) IV Push once  dextrose 50% Injectable 25 Gram(s) IV Push once  gabapentin 200 milliGRAM(s) Oral three times a day  glucagon  Injectable 1 milliGRAM(s) IntraMuscular once  heparin   Injectable 5000 Unit(s) SubCutaneous every 8 hours  indomethacin Suppository 100 milliGRAM(s) Rectal once  insulin glargine Injectable (LANTUS) 10 Unit(s) SubCutaneous at bedtime  insulin lispro (ADMELOG) corrective regimen sliding scale   SubCutaneous Before meals and at bedtime  metoprolol succinate  milliGRAM(s) Oral daily  piperacillin/tazobactam IVPB.. 3.375 Gram(s) IV Intermittent every 8 hours  sodium chloride 0.9%. 1000 milliLiter(s) (75 mL/Hr) IV Continuous <Continuous>  sodium chloride 0.9%. 1000 milliLiter(s) (70 mL/Hr) IV Continuous <Continuous>  spironolactone 12.5 milliGRAM(s) Oral daily      Assessment:  80 year old woman with CAD s/p CABG in 2020, prior DVT on Eliquis, HTN, and compensated diastolic CHF presents with acute cholecystitis.     Plan of Care:    #Post-operative risk evaluation-  S/p ERCP with CBD stent. Tolerate procedure well.   Ms. Batista displays no signs or symptoms of acute coronary ischemia or decompensated CHF.  Admission EKG is sinus with pre-existing anteroseptal q-waves.  Recent echo noted- normal LV systolic function with no hemodynamically significant valvular disease.  Continue current management  No IR intervention planned at this time.      #Compensated diastolic CHF-  Patient is euvolemic on exam.  Hold home diuretics given NPO status.     #CAD s/p CABG-  No signs of active ischemic.  ASA and statin as ordered.      #Sepsis  - 2/2 acute cholecystitis  - No surgery planned as pt is a poor candidate. IR to evaluate patient for percutaneous cholecystostomy after ERCP  - Abx as per primary team  - Recommend holding Amlodipine given soft BP readings.             Over 25 minutes spent on total encounter; more than 50% of the visit was spent counseling and/or coordinating care by the attending physician.      Vic Umana, DO [facility]  Cardiovascular Disease  [phone]

## 2023-01-21 NOTE — PROGRESS NOTE ADULT - SUBJECTIVE AND OBJECTIVE BOX
S: refers mild pain in the upper abdomen RUPQ>. Denies nausea or vomiting. Had ERCP yesterday. Tolerating FLD    O:  Vital Signs Last 24 Hrs  T(C): 36.8 (21 Jan 2023 16:30), Max: 37.3 (21 Jan 2023 13:21)  T(F): 98.2 (21 Jan 2023 16:30), Max: 99.1 (21 Jan 2023 13:21)  HR: 79 (21 Jan 2023 18:00) (75 - 88)  BP: 105/72 (21 Jan 2023 18:00) (92/57 - 131/71)  BP(mean): --  RR: 16 (21 Jan 2023 16:30) (16 - 18)  SpO2: 100% (21 Jan 2023 16:30) (95% - 100%)    Parameters below as of 21 Jan 2023 16:30  Patient On (Oxygen Delivery Method): nasal cannula  O2 Flow (L/min): 2    No acute distress, alert  Breathing comfortable on room air  Abdomen obese, soft, non distended, mildly epigastric/RUQ tenderness    01-21    137  |  100  |  43<H>  ----------------------------<  151<H>  4.0   |  26  |  2.67<H>    Ca    8.7      21 Jan 2023 06:59  Phos  3.8     01-21  Mg     1.9     01-21    TPro  6.0  /  Alb  2.7<L>  /  TBili  0.5  /  DBili  x   /  AST  14  /  ALT  20  /  AlkPhos  81  01-21

## 2023-01-22 LAB
ALBUMIN SERPL ELPH-MCNC: 2.6 G/DL — LOW (ref 3.3–5)
ALP SERPL-CCNC: 112 U/L — SIGNIFICANT CHANGE UP (ref 40–120)
ALT FLD-CCNC: 16 U/L — SIGNIFICANT CHANGE UP (ref 10–45)
ANION GAP SERPL CALC-SCNC: 15 MMOL/L — SIGNIFICANT CHANGE UP (ref 5–17)
AST SERPL-CCNC: 17 U/L — SIGNIFICANT CHANGE UP (ref 10–40)
BILIRUB SERPL-MCNC: 0.4 MG/DL — SIGNIFICANT CHANGE UP (ref 0.2–1.2)
BUN SERPL-MCNC: 47 MG/DL — HIGH (ref 7–23)
CALCIUM SERPL-MCNC: 8.7 MG/DL — SIGNIFICANT CHANGE UP (ref 8.4–10.5)
CHLORIDE SERPL-SCNC: 96 MMOL/L — SIGNIFICANT CHANGE UP (ref 96–108)
CO2 SERPL-SCNC: 22 MMOL/L — SIGNIFICANT CHANGE UP (ref 22–31)
CREAT SERPL-MCNC: 3.18 MG/DL — HIGH (ref 0.5–1.3)
EGFR: 14 ML/MIN/1.73M2 — LOW
GLUCOSE BLDC GLUCOMTR-MCNC: 107 MG/DL — HIGH (ref 70–99)
GLUCOSE BLDC GLUCOMTR-MCNC: 136 MG/DL — HIGH (ref 70–99)
GLUCOSE BLDC GLUCOMTR-MCNC: 178 MG/DL — HIGH (ref 70–99)
GLUCOSE BLDC GLUCOMTR-MCNC: 180 MG/DL — HIGH (ref 70–99)
GLUCOSE SERPL-MCNC: 178 MG/DL — HIGH (ref 70–99)
HCT VFR BLD CALC: 34.6 % — SIGNIFICANT CHANGE UP (ref 34.5–45)
HGB BLD-MCNC: 10.6 G/DL — LOW (ref 11.5–15.5)
LIDOCAIN IGE QN: 78 U/L — HIGH (ref 7–60)
LIDOCAIN IGE QN: 79 U/L — HIGH (ref 7–60)
MAGNESIUM SERPL-MCNC: 1.8 MG/DL — SIGNIFICANT CHANGE UP (ref 1.6–2.6)
MCHC RBC-ENTMCNC: 29.7 PG — SIGNIFICANT CHANGE UP (ref 27–34)
MCHC RBC-ENTMCNC: 30.6 GM/DL — LOW (ref 32–36)
MCV RBC AUTO: 96.9 FL — SIGNIFICANT CHANGE UP (ref 80–100)
NRBC # BLD: 0 /100 WBCS — SIGNIFICANT CHANGE UP (ref 0–0)
PHOSPHATE SERPL-MCNC: 4 MG/DL — SIGNIFICANT CHANGE UP (ref 2.5–4.5)
PLATELET # BLD AUTO: 201 K/UL — SIGNIFICANT CHANGE UP (ref 150–400)
POTASSIUM SERPL-MCNC: 4.2 MMOL/L — SIGNIFICANT CHANGE UP (ref 3.5–5.3)
POTASSIUM SERPL-SCNC: 4.2 MMOL/L — SIGNIFICANT CHANGE UP (ref 3.5–5.3)
PROT SERPL-MCNC: 5.9 G/DL — LOW (ref 6–8.3)
RBC # BLD: 3.57 M/UL — LOW (ref 3.8–5.2)
RBC # FLD: 15.7 % — HIGH (ref 10.3–14.5)
SODIUM SERPL-SCNC: 133 MMOL/L — LOW (ref 135–145)
WBC # BLD: 15.73 K/UL — HIGH (ref 3.8–10.5)
WBC # FLD AUTO: 15.73 K/UL — HIGH (ref 3.8–10.5)

## 2023-01-22 PROCEDURE — 76770 US EXAM ABDO BACK WALL COMP: CPT | Mod: 26

## 2023-01-22 PROCEDURE — 99233 SBSQ HOSP IP/OBS HIGH 50: CPT | Mod: GC

## 2023-01-22 PROCEDURE — 74176 CT ABD & PELVIS W/O CONTRAST: CPT | Mod: 26

## 2023-01-22 RX ORDER — ACETAMINOPHEN 500 MG
650 TABLET ORAL EVERY 6 HOURS
Refills: 0 | Status: DISCONTINUED | OUTPATIENT
Start: 2023-01-22 | End: 2023-01-30

## 2023-01-22 RX ORDER — BUMETANIDE 0.25 MG/ML
1 INJECTION INTRAMUSCULAR; INTRAVENOUS DAILY
Refills: 0 | Status: DISCONTINUED | OUTPATIENT
Start: 2023-01-22 | End: 2023-01-23

## 2023-01-22 RX ORDER — IPRATROPIUM/ALBUTEROL SULFATE 18-103MCG
3 AEROSOL WITH ADAPTER (GRAM) INHALATION EVERY 6 HOURS
Refills: 0 | Status: DISCONTINUED | OUTPATIENT
Start: 2023-01-22 | End: 2023-02-07

## 2023-01-22 RX ORDER — BUMETANIDE 0.25 MG/ML
1 INJECTION INTRAMUSCULAR; INTRAVENOUS DAILY
Refills: 0 | Status: DISCONTINUED | OUTPATIENT
Start: 2023-01-22 | End: 2023-01-22

## 2023-01-22 RX ORDER — INSULIN LISPRO 100/ML
VIAL (ML) SUBCUTANEOUS EVERY 6 HOURS
Refills: 0 | Status: DISCONTINUED | OUTPATIENT
Start: 2023-01-22 | End: 2023-01-24

## 2023-01-22 RX ADMIN — Medication 2: at 17:35

## 2023-01-22 RX ADMIN — Medication 650 MILLIGRAM(S): at 10:23

## 2023-01-22 RX ADMIN — GABAPENTIN 200 MILLIGRAM(S): 400 CAPSULE ORAL at 22:33

## 2023-01-22 RX ADMIN — INSULIN GLARGINE 10 UNIT(S): 100 INJECTION, SOLUTION SUBCUTANEOUS at 22:15

## 2023-01-22 RX ADMIN — Medication 650 MILLIGRAM(S): at 09:23

## 2023-01-22 RX ADMIN — HEPARIN SODIUM 5000 UNIT(S): 5000 INJECTION INTRAVENOUS; SUBCUTANEOUS at 14:34

## 2023-01-22 RX ADMIN — Medication 81 MILLIGRAM(S): at 11:53

## 2023-01-22 RX ADMIN — PIPERACILLIN AND TAZOBACTAM 25 GRAM(S): 4; .5 INJECTION, POWDER, LYOPHILIZED, FOR SOLUTION INTRAVENOUS at 17:36

## 2023-01-22 RX ADMIN — Medication 150 MILLIGRAM(S): at 06:48

## 2023-01-22 RX ADMIN — PIPERACILLIN AND TAZOBACTAM 25 GRAM(S): 4; .5 INJECTION, POWDER, LYOPHILIZED, FOR SOLUTION INTRAVENOUS at 09:24

## 2023-01-22 RX ADMIN — CHLORHEXIDINE GLUCONATE 1 APPLICATION(S): 213 SOLUTION TOPICAL at 06:45

## 2023-01-22 RX ADMIN — HEPARIN SODIUM 5000 UNIT(S): 5000 INJECTION INTRAVENOUS; SUBCUTANEOUS at 06:48

## 2023-01-22 RX ADMIN — GABAPENTIN 200 MILLIGRAM(S): 400 CAPSULE ORAL at 14:35

## 2023-01-22 RX ADMIN — HEPARIN SODIUM 5000 UNIT(S): 5000 INJECTION INTRAVENOUS; SUBCUTANEOUS at 22:33

## 2023-01-22 RX ADMIN — PIPERACILLIN AND TAZOBACTAM 25 GRAM(S): 4; .5 INJECTION, POWDER, LYOPHILIZED, FOR SOLUTION INTRAVENOUS at 01:52

## 2023-01-22 RX ADMIN — GABAPENTIN 200 MILLIGRAM(S): 400 CAPSULE ORAL at 06:48

## 2023-01-22 RX ADMIN — Medication 2: at 11:52

## 2023-01-22 RX ADMIN — ATORVASTATIN CALCIUM 40 MILLIGRAM(S): 80 TABLET, FILM COATED ORAL at 22:33

## 2023-01-22 RX ADMIN — Medication 3 MILLILITER(S): at 09:23

## 2023-01-22 NOTE — PROGRESS NOTE ADULT - ASSESSMENT
82 year old F with a PMHx of HTN, HLD, T2DM, HFpEF (EF 65%), breast cancer s/p R partial mastectomy, rectal carcinoma s/p resection, CAD s/p CABG (11/2020) who presents for 1 week of progressively worsening intermittent abdominal pain as well as N/V - found to have acute rissa, choledocho and DKA.    Impression:  #Choledocholithiasis - as seen on CT with CBD dilation. Noted normal bilirubin. Low suspicion for cholangitis at this point.   - Now s/p ERCP on 1/20 - choledocholithiasis w/ large stone w/ sphincterotomy and biliary stent placement.   - AST/ALT, alk phos and bilirubin are normal.      #Acute rissa - deemed not a surgical candidate  - had developed new onset abd pain on 1/22, obtained CT A/P non con which showed a collection at the GB neck concerning for perforated cholecystitis, surgery recs appreciated. Might need IR for placement for cholecystomy tubes. Biliary stent placement.     #DKA, elevated lactate - elevated BHB, hyperglycemia, HAGMA   #HFpEF  #DVT on eliquis   # CKD    Recommendations:   - Keep her NPO for now.   - Please consult surgery for possible perforation.   - continue with Zosyn for now.   - Trend CBC daily.   - Patient will need repeat ERCP in 2-3 months.     GI will continue to follow.     All recommendations are tentative until note is attested by an attending.     Angus Covington, PGY-4  Gastroenterology/Hepatology Fellow  Available on Microsoft Teams  [phone] (Short Range Pager)  [phone] (Long Range Pager)    After 5pm, please contact the on-call GI fellow. [phone]

## 2023-01-22 NOTE — PROGRESS NOTE ADULT - PROBLEM SELECTOR PLAN 4
Patient with HFpEF, follows with Dr. Hagan   - recent admission 8/2022 for ADHF and fluid overload  -  upon dc, 602 upon admission  - TTE 8/2022: EF 62%, normal LV/RV systolic function, elevated LV filling pressures, mild MR    Plan:  > continue with home meds: spironolactone 12.5 qd, metoprolol 150 qd  > hold bumex given NPO status this AM, Reassess volume status Patient with HFpEF, follows with Dr. Hagan   - recent admission 8/2022 for ADHF and fluid overload  -  upon dc, 602 upon admission  - TTE 8/2022: EF 62%, normal LV/RV systolic function, elevated LV filling pressures, mild MR    Plan:  > strict home parameters given tenuous volume status and intermittent hypotension  > hold bumex given worsening TAO, will reassess tomorrow

## 2023-01-22 NOTE — PROGRESS NOTE ADULT - PROBLEM SELECTOR PLAN 2
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

## 2023-01-22 NOTE — PROGRESS NOTE ADULT - SUBJECTIVE AND OBJECTIVE BOX
PROGRESS NOTE:     Patient is a 82y old  Female who presents with a chief complaint of acute cholecystitis, DKA (2023 20:56)      ---------------------------------------------------  Dmitry Dangelo  PGY-1, Internal Medicine  Available on Microsoft Teams  Pager: [phone] ([facility]), or [phone] ([facility])  ---------------------------------------------------    SUBJECTIVE / OVERNIGHT EVENTS:    No acute events overnight. Patient examined at bedside with no acute complaints.     Pain:  Bowel Movements:  Urination:  OOB:  PT:    REVIEW OF SYSTEMS:    CONSTITUTIONAL: No weakness, fevers or chills  EYES/ENT: No visual changes;  No vertigo or throat pain   NECK: No pain or stiffness  RESPIRATORY: No cough, wheezing, hemoptysis; No shortness of breath  CARDIOVASCULAR: No chest pain or palpitations  GASTROINTESTINAL: No abdominal or epigastric pain. No nausea, vomiting, or hematemesis; No diarrhea or constipation. No melena or hematochezia.  GENITOURINARY: No dysuria, frequency or hematuria  NEUROLOGICAL: No numbness or weakness  SKIN: No itching, rashes      MEDICATIONS  (STANDING):  aspirin  chewable 81 milliGRAM(s) Oral daily  atorvastatin 40 milliGRAM(s) Oral at bedtime  chlorhexidine 4% Liquid 1 Application(s) Topical <User Schedule>  dextrose 5%. 1000 milliLiter(s) (100 mL/Hr) IV Continuous <Continuous>  dextrose 5%. 1000 milliLiter(s) (50 mL/Hr) IV Continuous <Continuous>  dextrose 50% Injectable 25 Gram(s) IV Push once  dextrose 50% Injectable 12.5 Gram(s) IV Push once  dextrose 50% Injectable 25 Gram(s) IV Push once  gabapentin 200 milliGRAM(s) Oral three times a day  glucagon  Injectable 1 milliGRAM(s) IntraMuscular once  heparin   Injectable 5000 Unit(s) SubCutaneous every 8 hours  indomethacin Suppository 100 milliGRAM(s) Rectal once  insulin glargine Injectable (LANTUS) 10 Unit(s) SubCutaneous at bedtime  insulin lispro (ADMELOG) corrective regimen sliding scale   SubCutaneous Before meals and at bedtime  metoprolol succinate  milliGRAM(s) Oral daily  piperacillin/tazobactam IVPB.. 3.375 Gram(s) IV Intermittent every 8 hours    MEDICATIONS  (PRN):  acetaminophen     Tablet .. 650 milliGRAM(s) Oral every 6 hours PRN Moderate Pain (4 - 6)  aluminum hydroxide/magnesium hydroxide/simethicone Suspension 30 milliLiter(s) Oral every 6 hours PRN Dyspepsia  dextrose Oral Gel 15 Gram(s) Oral once PRN Blood Glucose LESS THAN 70 milliGRAM(s)/deciliter  morphine  - Injectable 1 milliGRAM(s) IV Push every 6 hours PRN Severe Pain (7 - 10)  sodium chloride 0.9% lock flush 10 milliLiter(s) IV Push every 1 hour PRN Pre/post blood products, medications, blood draw, and to maintain line patency      CAPILLARY BLOOD GLUCOSE      POCT Blood Glucose.: 151 mg/dL (2023 21:57)  POCT Blood Glucose.: 172 mg/dL (2023 16:50)  POCT Blood Glucose.: 234 mg/dL (2023 11:52)  POCT Blood Glucose.: 127 mg/dL (2023 07:41)    I&O's Summary    2023 07:01  -  2023 07:00  --------------------------------------------------------  IN: 1550 mL / OUT: 650 mL / NET: 900 mL        VITALS:   T(C): 37.8 (23 @ 05:09), Max: 37.8 (23 @ 05:09)  HR: 88 (23 @ 05:09) (71 - 91)  BP: 120/75 (23 @ 05:09) (90/52 - 131/71)  RR: 18 (23 @ 05:09) (16 - 18)  SpO2: 95% (23 @ 05:09) (95% - 100%)    GENERAL: NAD, lying in bed comfortably  HEAD:  Atraumatic, normocephalic  EYES: EOMI, PERRLA, conjunctiva and sclera clear  ENT: Moist mucous membranes  NECK: Supple, no JVD  HEART: Regular rate and rhythm, no murmurs, rubs, or gallops  LUNGS: Unlabored respirations.  Clear to auscultation bilaterally, no crackles, wheezing, or rhonchi  ABDOMEN: Soft, nontender, nondistended, +BS  EXTREMITIES: 2+ peripheral pulses bilaterally. No clubbing, cyanosis, or edema  NERVOUS SYSTEM:  A&Ox3, no focal deficits   SKIN: No rashes or lesions    LABS:                        11.5   13.38 )-----------( 188      ( 2023 06:59 )             37.6         140  |  101  |  44<H>  ----------------------------<  164<H>  3.8   |  23  |  2.54<H>    Ca    8.3<L>      2023 20:36  Phos  3.8       Mg     1.9         TPro  6.0  /  Alb  2.7<L>  /  TBili  0.5  /  DBili  x   /  AST  14  /  ALT  20  /  AlkPhos  81            Urinalysis Basic - ( 2023 14:44 )    Color: Yellow / Appearance: Slightly Turbid / S.022 / pH: x  Gluc: x / Ketone: Negative  / Bili: Negative / Urobili: Negative   Blood: x / Protein: 30 mg/dL / Nitrite: Negative   Leuk Esterase: Small / RBC: 39 /hpf / WBC 6 /HPF   Sq Epi: x / Non Sq Epi: 7 /hpf / Bacteria: Negative        Culture - Blood (collected 2023 23:59)  Source: .Blood Blood  Preliminary Report (2023 03:02):    No growth to date.    Culture - Blood (collected 2023 23:59)  Source: .Blood Blood-Venous  Preliminary Report (2023 03:01):    No growth to date.        RADIOLOGY & ADDITIONAL TESTS:  Results Reviewed:   Imaging Personally Reviewed:  Electrocardiogram Personally Reviewed:    COORDINATION OF CARE:  Care Discussed with Consultants/Other Providers [Y/N]:  Prior or Outpatient Records Reviewed [Y/N]:     PROGRESS NOTE:     Patient is a 82y old  Female who presents with a chief complaint of acute cholecystitis, DKA (2023 20:56)      ---------------------------------------------------  Dmitry Dangelo  PGY-1, Internal Medicine  Available on Microsoft Teams  Pager: [phone] ([facility]), or [phone] ([facility])  ---------------------------------------------------    SUBJECTIVE / OVERNIGHT EVENTS:    Patient intermittently hypotensive overnight, though self resolving episodes. Patient examined at bedside, translation services provided by [place]  [employer] (ID #[id number]). Patient complaining of some abdominal pain that she reports began yesterday. Patient is unable to localize the pain and reports generalized abdominal pain. No associated nausea or vomiting. Patient also had low grade fever to 100.5 F. Also reports that her breathing is now stable, but feels short of breath as soon as the oxygen is taken off.      MEDICATIONS  (STANDING):  aspirin  chewable 81 milliGRAM(s) Oral daily  atorvastatin 40 milliGRAM(s) Oral at bedtime  chlorhexidine 4% Liquid 1 Application(s) Topical <User Schedule>  dextrose 5%. 1000 milliLiter(s) (100 mL/Hr) IV Continuous <Continuous>  dextrose 5%. 1000 milliLiter(s) (50 mL/Hr) IV Continuous <Continuous>  dextrose 50% Injectable 25 Gram(s) IV Push once  dextrose 50% Injectable 12.5 Gram(s) IV Push once  dextrose 50% Injectable 25 Gram(s) IV Push once  gabapentin 200 milliGRAM(s) Oral three times a day  glucagon  Injectable 1 milliGRAM(s) IntraMuscular once  heparin   Injectable 5000 Unit(s) SubCutaneous every 8 hours  indomethacin Suppository 100 milliGRAM(s) Rectal once  insulin glargine Injectable (LANTUS) 10 Unit(s) SubCutaneous at bedtime  insulin lispro (ADMELOG) corrective regimen sliding scale   SubCutaneous Before meals and at bedtime  metoprolol succinate  milliGRAM(s) Oral daily  piperacillin/tazobactam IVPB.. 3.375 Gram(s) IV Intermittent every 8 hours    MEDICATIONS  (PRN):  acetaminophen     Tablet .. 650 milliGRAM(s) Oral every 6 hours PRN Moderate Pain (4 - 6)  aluminum hydroxide/magnesium hydroxide/simethicone Suspension 30 milliLiter(s) Oral every 6 hours PRN Dyspepsia  dextrose Oral Gel 15 Gram(s) Oral once PRN Blood Glucose LESS THAN 70 milliGRAM(s)/deciliter  morphine  - Injectable 1 milliGRAM(s) IV Push every 6 hours PRN Severe Pain (7 - 10)  sodium chloride 0.9% lock flush 10 milliLiter(s) IV Push every 1 hour PRN Pre/post blood products, medications, blood draw, and to maintain line patency      CAPILLARY BLOOD GLUCOSE      POCT Blood Glucose.: 151 mg/dL (2023 21:57)  POCT Blood Glucose.: 172 mg/dL (2023 16:50)  POCT Blood Glucose.: 234 mg/dL (2023 11:52)  POCT Blood Glucose.: 127 mg/dL (2023 07:41)    I&O's Summary    2023 07:01  -  2023 07:00  --------------------------------------------------------  IN: 1550 mL / OUT: 650 mL / NET: 900 mL        VITALS:   T(C): 37.8 (23 @ 05:09), Max: 37.8 (23 @ 05:09)  HR: 88 (23 @ 05:09) (71 - 91)  BP: 120/75 (23 @ 05:09) (90/52 - 131/71)  RR: 18 (23 @ 05:09) (16 - 18)  SpO2: 95% (23 @ 05:09) (95% - 100%)      GENERAL: Older woman in NAD, lying in bed comfortably  ENT: Moist mucous membranes  NECK: Supple   CHEST/LUNG: Clear to auscultation bilaterally; mild crackles, No rhonchi, wheezing, or rubs. Unlabored respirations  HEART: Regular rate and rhythm; No murmurs, rubs, or gallops  ABDOMEN: Generalized diffuse mild tenderness to palpation, bowel sounds present; Soft, Nontender, Nondistended. No hepatomegaly  EXTREMITIES:  2+ Peripheral Pulses, brisk capillary refill. No clubbing, cyanosis, or edema  NERVOUS SYSTEM:  Alert & Oriented X3, speech clear. No deficits   MSK: FROM all 4 extremities, full and equal strength  SKIN: No rashes or lesions        LABS:                          10.6   15.73 )-----------( 201      ( 2023 10:59 )             34.6     -    133<L>  |  96  |  47<H>  ----------------------------<  178<H>  4.2   |  22  |  3.18<H>    Ca    8.7      2023 11:00  Phos  4.0       Mg     1.8         TPro  5.9<L>  /  Alb  2.6<L>  /  TBili  0.4  /  DBili  x   /  AST  17  /  ALT  16  /  AlkPhos  112          TPro  6.0  /  Alb  2.7<L>  /  TBili  0.5  /  DBili  x   /  AST  14  /  ALT  20  /  AlkPhos  81  21        Urinalysis Basic - ( 2023 14:44 )    Color: Yellow / Appearance: Slightly Turbid / S.022 / pH: x  Gluc: x / Ketone: Negative  / Bili: Negative / Urobili: Negative   Blood: x / Protein: 30 mg/dL / Nitrite: Negative   Leuk Esterase: Small / RBC: 39 /hpf / WBC 6 /HPF   Sq Epi: x / Non Sq Epi: 7 /hpf / Bacteria: Negative        Culture - Blood (collected 2023 23:59)  Source: .Blood Blood  Preliminary Report (2023 03:02):    No growth to date.    Culture - Blood (collected 2023 23:59)  Source: .Blood Blood-Venous  Preliminary Report (2023 03:01):    No growth to date.           PROGRESS NOTE:     Patient is a 82y old  Female who presents with a chief complaint of acute cholecystitis, DKA (2023 20:56)      ---------------------------------------------------  Dmitry Dangelo  PGY-1, Internal Medicine  Available on [employer] Teams  Pager: [phone] ([facility]), or [phone] ([facility])  ---------------------------------------------------    SUBJECTIVE / OVERNIGHT EVENTS:    Patient intermittently hypotensive overnight, though self resolving episodes. Patient examined at bedside, translation services provided by [place]  [employer] (ID #[id number]). Patient complaining of some abdominal pain that she reports began yesterday. Patient is unable to localize the pain and reports generalized abdominal pain. No associated nausea or vomiting. Patient also had low grade fever to 100.5 F. Also reports that her breathing is now stable, but feels short of breath as soon as the oxygen is taken off. Notably, patient with prominent wheezing on exam; appreciated even without auscultation.      MEDICATIONS  (STANDING):  aspirin  chewable 81 milliGRAM(s) Oral daily  atorvastatin 40 milliGRAM(s) Oral at bedtime  chlorhexidine 4% Liquid 1 Application(s) Topical <User Schedule>  dextrose 5%. 1000 milliLiter(s) (100 mL/Hr) IV Continuous <Continuous>  dextrose 5%. 1000 milliLiter(s) (50 mL/Hr) IV Continuous <Continuous>  dextrose 50% Injectable 25 Gram(s) IV Push once  dextrose 50% Injectable 12.5 Gram(s) IV Push once  dextrose 50% Injectable 25 Gram(s) IV Push once  gabapentin 200 milliGRAM(s) Oral three times a day  glucagon  Injectable 1 milliGRAM(s) IntraMuscular once  heparin   Injectable 5000 Unit(s) SubCutaneous every 8 hours  indomethacin Suppository 100 milliGRAM(s) Rectal once  insulin glargine Injectable (LANTUS) 10 Unit(s) SubCutaneous at bedtime  insulin lispro (ADMELOG) corrective regimen sliding scale   SubCutaneous Before meals and at bedtime  metoprolol succinate  milliGRAM(s) Oral daily  piperacillin/tazobactam IVPB.. 3.375 Gram(s) IV Intermittent every 8 hours    MEDICATIONS  (PRN):  acetaminophen     Tablet .. 650 milliGRAM(s) Oral every 6 hours PRN Moderate Pain (4 - 6)  aluminum hydroxide/magnesium hydroxide/simethicone Suspension 30 milliLiter(s) Oral every 6 hours PRN Dyspepsia  dextrose Oral Gel 15 Gram(s) Oral once PRN Blood Glucose LESS THAN 70 milliGRAM(s)/deciliter  morphine  - Injectable 1 milliGRAM(s) IV Push every 6 hours PRN Severe Pain (7 - 10)  sodium chloride 0.9% lock flush 10 milliLiter(s) IV Push every 1 hour PRN Pre/post blood products, medications, blood draw, and to maintain line patency      CAPILLARY BLOOD GLUCOSE      POCT Blood Glucose.: 151 mg/dL (2023 21:57)  POCT Blood Glucose.: 172 mg/dL (2023 16:50)  POCT Blood Glucose.: 234 mg/dL (2023 11:52)  POCT Blood Glucose.: 127 mg/dL (2023 07:41)    I&O's Summary    2023 07:01  -  2023 07:00  --------------------------------------------------------  IN: 1550 mL / OUT: 650 mL / NET: 900 mL        VITALS:   T(C): 37.8 (23 @ 05:09), Max: 37.8 (23 @ 05:09)  HR: 88 (23 @ 05:09) (71 - 91)  BP: 120/75 (23 @ 05:09) (90/52 - 131/71)  RR: 18 (23 @ 05:09) (16 - 18)  SpO2: 95% (23 @ 05:09) (95% - 100%)      GENERAL: Older woman in NAD, lying in bed comfortably  ENT: Moist mucous membranes  NECK: Supple   CHEST/LUNG: End expiratory wheezing appreciated bilaterally, faint inspiratory crackles  HEART: Regular rate and rhythm; No murmurs, rubs, or gallops  ABDOMEN: Generalized diffuse mild tenderness to palpation, bowel sounds present; Soft, Nontender, Nondistended. No hepatomegaly  EXTREMITIES: +bilateral UE lymphedema R > L, 1+ bilateral LE pitting edema, 2+ peripheral pulses bilaterally  NERVOUS SYSTEM:  Alert & Oriented X3, speech clear. No deficits   MSK: FROM all 4 extremities, full and equal strength  SKIN: No rashes or lesions        LABS:                          10.6   15.73 )-----------( 201      ( 2023 10:59 )             34.6     01-22    133<L>  |  96  |  47<H>  ----------------------------<  178<H>  4.2   |  22  |  3.18<H>    Ca    8.7      2023 11:00  Phos  4.0       Mg     1.8         TPro  5.9<L>  /  Alb  2.6<L>  /  TBili  0.4  /  DBili  x   /  AST  17  /  ALT  16  /  AlkPhos  112          TPro  6.0  /  Alb  2.7<L>  /  TBili  0.5  /  DBili  x   /  AST  14  /  ALT  20  /  AlkPhos  81  -21        Urinalysis Basic - ( 2023 14:44 )    Color: Yellow / Appearance: Slightly Turbid / S.022 / pH: x  Gluc: x / Ketone: Negative  / Bili: Negative / Urobili: Negative   Blood: x / Protein: 30 mg/dL / Nitrite: Negative   Leuk Esterase: Small / RBC: 39 /hpf / WBC 6 /HPF   Sq Epi: x / Non Sq Epi: 7 /hpf / Bacteria: Negative        Culture - Blood (collected 2023 23:59)  Source: .Blood Blood  Preliminary Report (2023 03:02):    No growth to date.    Culture - Blood (collected 2023 23:59)  Source: .Blood Blood-Venous  Preliminary Report (2023 03:01):    No growth to date.

## 2023-01-22 NOTE — PROGRESS NOTE ADULT - PROBLEM SELECTOR PLAN 1
Patient presenting with 1 week of abdominal pain, nausea, and vomiting  - CT A/P: acute cholecystitis with 1.2 cm common bile duct dilatation  - per surgery, not a surgical candidate given comorbidities   > s/p ERCP : choledocholithiasis w/ large stone w/ sphincterotomy and biliary stent placement.     Plan:    > IR recs appreciated, no need for IR intervention as patient is improving   > continue with zosyn for 7 day course   > pain/nausea control with tylenol and maalox  > f/u AM lipase levels   > AVOID zofran given QTc 518  > trend CMP, coags, otherwise stable  > advance diet as tolerated   > Patient will need repeat ERCP in 2-3 months.   - If she develops significant abd pain w/ fevers and chills, please obtain CT A/P w/ contrast and lipase levels. Patient presenting with 1 week of abdominal pain, nausea, and vomiting  - CT A/P: acute cholecystitis with 1.2 cm common bile duct dilatation  - per surgery, not a surgical candidate given comorbidities   - s/p ERCP 1/20 : choledocholithiasis w/ large stone w/ sphincterotomy and biliary stent placement.   - patient now with recurrent abdominal pain, and leukocytosis     Plan:  > will obtain CT A/P without contrast, patient cannot get IV contrast given worsening TAO on CKD and cannot provide pre-contrast fluid resuscitation given fluid overload in the setting of heart failure  > if no improvement, will reconsult IR tomorrow for reevaluation of percutaneous cholecystostomy  > continue with zosyn for 7 day course   > pain/nausea control with tylenol and maalox  > lipase WNL  > AVOID zofran given QTc 518  > trend CMP, coags, otherwise stable  > advance diet as tolerated   > Patient will need repeat ERCP in 2-3 months.

## 2023-01-22 NOTE — CHART NOTE - NSCHARTNOTEFT_GEN_A_CORE
Was contacted by medicine team about patient having low grade fevers with mild abdominal pain and questionable gallbladder perforation.   Imaging was reviewed. The gallbladder appears decompressed on CT 1/22 when compared to CT 1/18 with similar inflammatory changes in the region of the ampulla.   On board with MRI to further evaluate the region of the CBD/ampulla.   Given that the gallbladder is now decompressed, no intervention is recommended at this time.   Please reach out to IR if patient decompensates.

## 2023-01-22 NOTE — CHART NOTE - NSCHARTNOTEFT_GEN_A_CORE
[facility] d/t increasing abd pain and new CT abd w/o contrast read as "New ill-defined collection in the region of the gallbladder neck and common bile duct also encompassing the second portion of the duodenum. Findings may be related to bile leak, perforated cholecystitis, or duodenal hematoma". Patient seen at bedside, endorses abd pain that is increased from prior. Per nursing staff patient endorsed no pain earlier today. Non-peritoneal, no guarding.      Gen: NAD,  Resp: on 2LNC, no increased WOB  Abd exam: obese, abd soft, moderately distended, mild tender to palpation in the RUQ.     Assessment: 82F with RUQ pain c/s acute cholecystitis and choledocholithiasis with significant co-morbidities including CAD, s/p CABG, CHF, poorly controlled DM, s/p ERCP (1/20). Doing ok, still poor operative candidate. Imaging reviewed and unable to define source of new collection vs inflammatory changes.     Plan:  - Patient stable, still poor surgical candidate  - would re-consult IR for possible drainage  - continue IVF + IV abx  - continue supportive care  - pain control PRN

## 2023-01-22 NOTE — PROGRESS NOTE ADULT - SUBJECTIVE AND OBJECTIVE BOX
Gastroenterology/Hepatology Progress Note      Interval Events:     Patient is having new onset abd pain and nausea but no vomiting. Has fevers and chills. tolerating po diet well.     Allergies:  CT scan dye (Hives)  penicillin (Unknown)      Hospital Medications:  acetaminophen     Tablet .. 650 milliGRAM(s) Oral every 6 hours PRN  albuterol/ipratropium for Nebulization 3 milliLiter(s) Nebulizer every 6 hours PRN  aluminum hydroxide/magnesium hydroxide/simethicone Suspension 30 milliLiter(s) Oral every 6 hours PRN  aspirin  chewable 81 milliGRAM(s) Oral daily  atorvastatin 40 milliGRAM(s) Oral at bedtime  buMETAnide 1 milliGRAM(s) Oral daily  chlorhexidine 4% Liquid 1 Application(s) Topical <User Schedule>  dextrose 5%. 1000 milliLiter(s) IV Continuous <Continuous>  dextrose 5%. 1000 milliLiter(s) IV Continuous <Continuous>  dextrose 50% Injectable 25 Gram(s) IV Push once  dextrose 50% Injectable 12.5 Gram(s) IV Push once  dextrose 50% Injectable 25 Gram(s) IV Push once  dextrose Oral Gel 15 Gram(s) Oral once PRN  gabapentin 200 milliGRAM(s) Oral three times a day  glucagon  Injectable 1 milliGRAM(s) IntraMuscular once  heparin   Injectable 5000 Unit(s) SubCutaneous every 8 hours  indomethacin Suppository 100 milliGRAM(s) Rectal once  insulin glargine Injectable (LANTUS) 10 Unit(s) SubCutaneous at bedtime  insulin lispro (ADMELOG) corrective regimen sliding scale   SubCutaneous Before meals and at bedtime  metoprolol succinate  milliGRAM(s) Oral daily  morphine  - Injectable 1 milliGRAM(s) IV Push every 6 hours PRN  piperacillin/tazobactam IVPB.. 3.375 Gram(s) IV Intermittent every 8 hours  sodium chloride 0.9% lock flush 10 milliLiter(s) IV Push every 1 hour PRN      ROS: 14 point ROS negative unless otherwise state in subjective    PHYSICAL EXAM:   Vital Signs:  Vital Signs Last 24 Hrs  T(C): 36.9 (2023 12:49), Max: 38.1 (2023 09:01)  T(F): 98.5 (2023 12:49), Max: 100.5 (2023 09:01)  HR: 76 (2023 12:49) (71 - 91)  BP: 98/64 (2023 12:49) (90/52 - 120/75)  BP(mean): --  RR: 18 (2023 12:49) (18 - 18)  SpO2: 97% (2023 12:49) (95% - 99%)    Parameters below as of 2023 12:49  Patient On (Oxygen Delivery Method): nasal cannula  O2 Flow (L/min): 2    Daily     Daily     GENERAL:  No acute distress, overweight female, lying in bed.   HEENT:  NCAT, no scleral icterus  CHEST: no resp distress  ABDOMEN:  Soft, mild tenderness in the RUQ, non-distended, no masses  EXTREMITIES:  No LE edema  NEURO:  Alert and oriented x 3, no tremor      LABS:                        10.6   15.73 )-----------( 201      ( 2023 10:59 )             34.6     Mean Cell Volume: 96.9 fl (-23 @ 10:59)        133<L>  |  96  |  47<H>  ----------------------------<  178<H>  4.2   |  22  |  3.18<H>    Ca    8.7      2023 11:00  Phos  4.0       Mg     1.8         TPro  5.9<L>  /  Alb  2.6<L>  /  TBili  0.4  /  DBili  x   /  AST  17  /  ALT  16  /  AlkPhos  112      LIVER FUNCTIONS - ( 2023 11:00 )  Alb: 2.6 g/dL / Pro: 5.9 g/dL / ALK PHOS: 112 U/L / ALT: 16 U/L / AST: 17 U/L / GGT: x             Urinalysis Basic - ( 2023 14:44 )    Color: Yellow / Appearance: Slightly Turbid / S.022 / pH: x  Gluc: x / Ketone: Negative  / Bili: Negative / Urobili: Negative   Blood: x / Protein: 30 mg/dL / Nitrite: Negative   Leuk Esterase: Small / RBC: 39 /hpf / WBC 6 /HPF   Sq Epi: x / Non Sq Epi: 7 /hpf / Bacteria: Negative      Amylase Serum--      Lipase serum78       Ammonia--        Imaging:    ERCP on     Impression:          EGD:                       - Small periampullary diverticulum                       EUS:                       - Choledocholithiasis in a mildly dilated common bile duct.                       - Cholelithiasis                       ERCP:                       - Choledocholithiasis was found, with at least one large stone. Biliary                        sphincterotomy performed, but limited size due to periampullary diverticulum.                        10 Fr x 5 cm biliary stent placed.

## 2023-01-22 NOTE — PROGRESS NOTE ADULT - SUBJECTIVE AND OBJECTIVE BOX
Cardiovascular Disease Progress Note  Date of service: 01-22-23 @ 10:54  Covering for. Dr Winchester  Overnight events: No acute events overnight.  Pt febrile this morning. Pt is in no distress.   Otherwise review of systems negative    Objective Findings:  T(C): 38.1 (01-22-23 @ 09:01), Max: 38.1 (01-22-23 @ 09:01)  HR: 80 (01-22-23 @ 09:01) (71 - 91)  BP: 103/68 (01-22-23 @ 09:01) (90/52 - 120/75)  RR: 18 (01-22-23 @ 09:01) (16 - 18)  SpO2: 96% (01-22-23 @ 09:01) (95% - 100%)  Wt(kg): --  Daily     Daily       Physical Exam:  Gen: NAD; Patient resting comfortably  HEENT: EOMI, Normocephalic/ atraumatic  CV: RRR, normal S1 + S2, no m/r/g  Lungs:  Normal respiratory effort; clear to auscultation bilaterally  Abd: soft, non-tender; bowel sounds present  Ext: No edema; warm and well perfused    Telemetry: N/a    Laboratory Data:                        11.5   13.38 )-----------( 188      ( 21 Jan 2023 06:59 )             37.6     01-21    140  |  101  |  44<H>  ----------------------------<  164<H>  3.8   |  23  |  2.54<H>    Ca    8.3<L>      21 Jan 2023 20:36  Phos  3.8     01-21  Mg     1.9     01-21    TPro  6.0  /  Alb  2.7<L>  /  TBili  0.5  /  DBili  x   /  AST  14  /  ALT  20  /  AlkPhos  81  01-21              Inpatient Medications:  MEDICATIONS  (STANDING):  aspirin  chewable 81 milliGRAM(s) Oral daily  atorvastatin 40 milliGRAM(s) Oral at bedtime  chlorhexidine 4% Liquid 1 Application(s) Topical <User Schedule>  dextrose 5%. 1000 milliLiter(s) (100 mL/Hr) IV Continuous <Continuous>  dextrose 5%. 1000 milliLiter(s) (50 mL/Hr) IV Continuous <Continuous>  dextrose 50% Injectable 25 Gram(s) IV Push once  dextrose 50% Injectable 12.5 Gram(s) IV Push once  dextrose 50% Injectable 25 Gram(s) IV Push once  gabapentin 200 milliGRAM(s) Oral three times a day  glucagon  Injectable 1 milliGRAM(s) IntraMuscular once  heparin   Injectable 5000 Unit(s) SubCutaneous every 8 hours  indomethacin Suppository 100 milliGRAM(s) Rectal once  insulin glargine Injectable (LANTUS) 10 Unit(s) SubCutaneous at bedtime  insulin lispro (ADMELOG) corrective regimen sliding scale   SubCutaneous Before meals and at bedtime  metoprolol succinate  milliGRAM(s) Oral daily  piperacillin/tazobactam IVPB.. 3.375 Gram(s) IV Intermittent every 8 hours      Assessment: 80 year old woman with CAD s/p CABG in 2020, prior DVT on Eliquis, HTN, and compensated diastolic CHF presents with acute cholecystitis.     Plan of Care:    #Post-operative risk evaluation-  S/p ERCP with CBD stent. Tolerate procedure well.   Ms. Batista displays no signs or symptoms of acute coronary ischemia or decompensated CHF.  Admission EKG is sinus with pre-existing anteroseptal q-waves.  Recent echo noted- normal LV systolic function with no hemodynamically significant valvular disease.  Continue current management  No IR intervention planned at this time.      #Compensated diastolic CHF-  Patient is euvolemic on exam.  PO diuretic on hold. TAO improving.       #CAD s/p CABG-  No signs of active ischemic.  ASA and statin as ordered.      #Sepsis  - Pt with low grade fever   - 2/2 acute cholecystitis   - S/p CBD stent  - Abx as per primary team  - Recommend holding Amlodipine given soft BP readings.       #ACP (advance care planning)-  Advanced care planning was discussed with the patient.  Risks, benefits and alternatives of medical treatment and procedures were discussed in detail and all questions were answered. 30 additional minutes spent addressing advance care plans.          Over 25 minutes spent on total encounter; more than 50% of the visit was spent counseling and/or coordinating care by the attending physician.      Vic Umana DO [facility]  Cardiovascular Disease  [phone]

## 2023-01-22 NOTE — CHART NOTE - NSCHARTNOTEFT_GEN_A_CORE
This AM patient reported worsened abdominal pain, and also had inc leukocytosis and low grade fever. CT abdomen/pelvis w IV contrast was considered to evaluate for acute abdominal pathology. However, patient with worsened ATO on CKD. Nephrology curbsided who recommended only to pursue IV contrast if emergent. Patient was hemodynamically stable, mild TTP on exam, nontoxic. CT A/P non con was done, which was concerning for fluid collection around gallbladder neck indicative of possible perforated cholecystitis. Case discussed with GI fellow, recommended general surgery consult. General surgery consulted, reported that patient is not a good surgical candidate and recommended IR consult.     IR consulted, who reviewed images and reported that the gallbladder looks similar to prior scan from 1/8 prior to the ERCP. New imaging findings may be more suggestive inflammatory changes, and the gallbladder seems to be adequately decompressed.    - patient made NPO, aspirin held in case of procedure  - patient remains hemodynamically stable, and not toxic; will closely monitor overnight  - will sign out to night float to follow up general surgery and IR recommendations and to reconsult IR if patient decompensates    Dmitry Dangelo  PGY-1, Internal Medicine  Available on [employer] Teams  Pager: [phone] ([facility]), [phone] (NS) This AM patient reported worsened abdominal pain, and also had inc leukocytosis and low grade fever. CT abdomen/pelvis w IV contrast was considered to evaluate for acute abdominal pathology. However, patient with worsened TAO on CKD. Nephrology curbsided who recommended only to pursue IV contrast if emergent. Patient was hemodynamically stable, mild TTP on exam, nontoxic. CT A/P non con was done, which was concerning for fluid collection around gallbladder neck indicative of possible perforated cholecystitis. Case discussed with GI fellow, recommended general surgery consult. General surgery consulted, reported that patient is not a good surgical candidate and recommended IR consult.     IR consulted, who reviewed images and reported that the gallbladder looks similar to prior scan from 1/8 prior to the ERCP. New imaging findings may be more suggestive inflammatory changes, and the gallbladder seems to be adequately decompressed.    - patient made NPO, aspirin held in case of procedure  - patient remains hemodynamically stable, and not toxic; will closely monitor overnight  - will sign out to night float to follow up general surgery and IR recommendations and to reconsult IR if patient decompensates  - will order MRI to further assess fluid collection    Dmitry Dangelo  PGY-1, Internal Medicine  Available on [employer] Teams  Pager: [phone] ([facility]), [phone] (NS) This AM patient reported worsened abdominal pain, and also had inc leukocytosis and low grade fever. CT abdomen/pelvis w IV contrast was considered to evaluate for acute abdominal pathology. However, patient with worsened TAO on CKD. Nephrology curbsided who recommended only to pursue IV contrast if emergent. Patient was hemodynamically stable, mild TTP on exam, nontoxic. CT A/P non con was done, which was concerning for fluid collection around gallbladder neck indicative of possible perforated cholecystitis. Case discussed with GI fellow, recommended general surgery consult. General surgery consulted, reported that patient is not a good surgical candidate and recommended IR consult.     IR consulted, who reviewed images and reported that the gallbladder looks similar to prior scan from 1/18 prior to the ERCP. New imaging findings may be more suggestive inflammatory changes, and the gallbladder seems to be adequately decompressed.    - patient made NPO, aspirin held in case of procedure  - patient remains hemodynamically stable, and not toxic; will closely monitor overnight  - will sign out to night float to follow up general surgery and IR recommendations and to reconsult IR if patient decompensates  - will order MRI to further assess fluid collection    Dmitry Dangelo  PGY-1, Internal Medicine  Available on [employer] Teams  Pager: [phone] ([facility]), [phone] (NS)

## 2023-01-22 NOTE — PROGRESS NOTE ADULT - ATTENDING COMMENTS
This is a 82F with h/o HTN, T2DM, HFpEF (EF 65%, normal LV in 2022), breast cancer s/p R partial mastectomy, rectal carcinoma s/p resection, CAD s/p CABG who presents for 1 week of intermittent abdominal pain associated with nausea and vomiting.    1. Acute cholecystitis with choledocholithiasis   2. Uncontrolled T2DM with hyperglycemia  3. TAO on CKD 3, pre-renal  4. Chronic hydronephrosis  5. HTN  6. h/o CABG    - 1/21: on exam patient is dyspneic and complaining of having trouble catching her breath. pulse ox 97-97%, diffuse crackles BL. given bumex 2mg IV x1 stat. chest xray indicating fluid overload. HOLD aldactone given soft BPs.  - patient with improvement in crackles and some wheezing on exam today, start home dose bumex po. duonebs  - sCr worsening. check bladder scans.  - s/p ERCP by GI on 1/20:  large CBD stones on fluoro and relatively small sized ampulla; +small periampullary diverticula; s/p sphincterotomy + plastic CBD stent placement with successful drainage of bile/sludge. patient will need  repeat ERCP in 2-3 months. No IR intervention planned at this time.  - patient with abdominal pain and fever this am > check urgent CT abd pelvis w contrast > if nephro team not in agreement with contrast then need to check with GI team for alternative imaging options  - diet, advance as tolerated   - continue IV Zosyn. check repeat blood cx 1/22   - adjust insulin as patient's diet is resumed.

## 2023-01-22 NOTE — PROGRESS NOTE ADULT - PROBLEM SELECTOR PLAN 6
Diet: Clear liquid, advance as tolerated   DVT PPx: heparin subq   Dispo: pending clinical improvement Diet: full liquid  DVT PPx: heparin subq   Dispo: pending clinical improvement

## 2023-01-23 LAB
ALBUMIN SERPL ELPH-MCNC: 2.7 G/DL — LOW (ref 3.3–5)
ALP SERPL-CCNC: 109 U/L — SIGNIFICANT CHANGE UP (ref 40–120)
ALT FLD-CCNC: 15 U/L — SIGNIFICANT CHANGE UP (ref 10–45)
ANION GAP SERPL CALC-SCNC: 18 MMOL/L — HIGH (ref 5–17)
AST SERPL-CCNC: 21 U/L — SIGNIFICANT CHANGE UP (ref 10–40)
BILIRUB SERPL-MCNC: 0.4 MG/DL — SIGNIFICANT CHANGE UP (ref 0.2–1.2)
BUN SERPL-MCNC: 51 MG/DL — HIGH (ref 7–23)
CALCIUM SERPL-MCNC: 9.1 MG/DL — SIGNIFICANT CHANGE UP (ref 8.4–10.5)
CHLORIDE SERPL-SCNC: 96 MMOL/L — SIGNIFICANT CHANGE UP (ref 96–108)
CO2 SERPL-SCNC: 20 MMOL/L — LOW (ref 22–31)
CREAT SERPL-MCNC: 3.78 MG/DL — HIGH (ref 0.5–1.3)
EGFR: 11 ML/MIN/1.73M2 — LOW
GLUCOSE BLDC GLUCOMTR-MCNC: 111 MG/DL — HIGH (ref 70–99)
GLUCOSE BLDC GLUCOMTR-MCNC: 120 MG/DL — HIGH (ref 70–99)
GLUCOSE BLDC GLUCOMTR-MCNC: 130 MG/DL — HIGH (ref 70–99)
GLUCOSE BLDC GLUCOMTR-MCNC: 139 MG/DL — HIGH (ref 70–99)
GLUCOSE SERPL-MCNC: 125 MG/DL — HIGH (ref 70–99)
HCT VFR BLD CALC: 39.7 % — SIGNIFICANT CHANGE UP (ref 34.5–45)
HGB BLD-MCNC: 11.7 G/DL — SIGNIFICANT CHANGE UP (ref 11.5–15.5)
MAGNESIUM SERPL-MCNC: 2 MG/DL — SIGNIFICANT CHANGE UP (ref 1.6–2.6)
MCHC RBC-ENTMCNC: 29.5 GM/DL — LOW (ref 32–36)
MCHC RBC-ENTMCNC: 29.7 PG — SIGNIFICANT CHANGE UP (ref 27–34)
MCV RBC AUTO: 100.8 FL — HIGH (ref 80–100)
NRBC # BLD: 0 /100 WBCS — SIGNIFICANT CHANGE UP (ref 0–0)
PHOSPHATE SERPL-MCNC: 4.4 MG/DL — SIGNIFICANT CHANGE UP (ref 2.5–4.5)
PLATELET # BLD AUTO: 175 K/UL — SIGNIFICANT CHANGE UP (ref 150–400)
POTASSIUM SERPL-MCNC: 4.2 MMOL/L — SIGNIFICANT CHANGE UP (ref 3.5–5.3)
POTASSIUM SERPL-SCNC: 4.2 MMOL/L — SIGNIFICANT CHANGE UP (ref 3.5–5.3)
PROT SERPL-MCNC: 6.6 G/DL — SIGNIFICANT CHANGE UP (ref 6–8.3)
RAPID RVP RESULT: SIGNIFICANT CHANGE UP
RBC # BLD: 3.94 M/UL — SIGNIFICANT CHANGE UP (ref 3.8–5.2)
RBC # FLD: 15.9 % — HIGH (ref 10.3–14.5)
SARS-COV-2 RNA SPEC QL NAA+PROBE: SIGNIFICANT CHANGE UP
SODIUM SERPL-SCNC: 134 MMOL/L — LOW (ref 135–145)
WBC # BLD: 14.55 K/UL — HIGH (ref 3.8–10.5)
WBC # FLD AUTO: 14.55 K/UL — HIGH (ref 3.8–10.5)

## 2023-01-23 PROCEDURE — 99233 SBSQ HOSP IP/OBS HIGH 50: CPT | Mod: GC

## 2023-01-23 PROCEDURE — 74181 MRI ABDOMEN W/O CONTRAST: CPT | Mod: 26

## 2023-01-23 RX ORDER — SODIUM CHLORIDE 9 MG/ML
500 INJECTION, SOLUTION INTRAVENOUS
Refills: 0 | Status: DISCONTINUED | OUTPATIENT
Start: 2023-01-23 | End: 2023-01-23

## 2023-01-23 RX ORDER — SODIUM CHLORIDE 9 MG/ML
300 INJECTION INTRAMUSCULAR; INTRAVENOUS; SUBCUTANEOUS
Refills: 0 | Status: DISCONTINUED | OUTPATIENT
Start: 2023-01-23 | End: 2023-01-25

## 2023-01-23 RX ADMIN — Medication 150 MILLIGRAM(S): at 07:02

## 2023-01-23 RX ADMIN — HEPARIN SODIUM 5000 UNIT(S): 5000 INJECTION INTRAVENOUS; SUBCUTANEOUS at 21:43

## 2023-01-23 RX ADMIN — INSULIN GLARGINE 10 UNIT(S): 100 INJECTION, SOLUTION SUBCUTANEOUS at 21:42

## 2023-01-23 RX ADMIN — HEPARIN SODIUM 5000 UNIT(S): 5000 INJECTION INTRAVENOUS; SUBCUTANEOUS at 13:26

## 2023-01-23 RX ADMIN — HEPARIN SODIUM 5000 UNIT(S): 5000 INJECTION INTRAVENOUS; SUBCUTANEOUS at 07:03

## 2023-01-23 RX ADMIN — PIPERACILLIN AND TAZOBACTAM 25 GRAM(S): 4; .5 INJECTION, POWDER, LYOPHILIZED, FOR SOLUTION INTRAVENOUS at 09:48

## 2023-01-23 RX ADMIN — Medication 3 MILLILITER(S): at 00:54

## 2023-01-23 RX ADMIN — Medication 650 MILLIGRAM(S): at 00:55

## 2023-01-23 RX ADMIN — ATORVASTATIN CALCIUM 40 MILLIGRAM(S): 80 TABLET, FILM COATED ORAL at 21:42

## 2023-01-23 RX ADMIN — PIPERACILLIN AND TAZOBACTAM 25 GRAM(S): 4; .5 INJECTION, POWDER, LYOPHILIZED, FOR SOLUTION INTRAVENOUS at 18:25

## 2023-01-23 RX ADMIN — CHLORHEXIDINE GLUCONATE 1 APPLICATION(S): 213 SOLUTION TOPICAL at 07:04

## 2023-01-23 RX ADMIN — PIPERACILLIN AND TAZOBACTAM 25 GRAM(S): 4; .5 INJECTION, POWDER, LYOPHILIZED, FOR SOLUTION INTRAVENOUS at 01:29

## 2023-01-23 RX ADMIN — Medication 3 MILLILITER(S): at 21:43

## 2023-01-23 RX ADMIN — SODIUM CHLORIDE 50 MILLILITER(S): 9 INJECTION INTRAMUSCULAR; INTRAVENOUS; SUBCUTANEOUS at 18:46

## 2023-01-23 RX ADMIN — GABAPENTIN 200 MILLIGRAM(S): 400 CAPSULE ORAL at 21:42

## 2023-01-23 RX ADMIN — GABAPENTIN 200 MILLIGRAM(S): 400 CAPSULE ORAL at 07:02

## 2023-01-23 RX ADMIN — GABAPENTIN 200 MILLIGRAM(S): 400 CAPSULE ORAL at 13:23

## 2023-01-23 NOTE — PROGRESS NOTE ADULT - SUBJECTIVE AND OBJECTIVE BOX
PROGRESS NOTE:     Patient is a 82y old  Female who presents with a chief complaint of acute cholecystitis, DKA (2023 17:45)      ---------------------------------------------------  Dmitry Dangelo  PGY-1, Internal Medicine  Available on [employer] Teams  Pager: [phone] ([facility]), or [phone] (NS)  ---------------------------------------------------    SUBJECTIVE / OVERNIGHT EVENTS:    No acute events overnight. Patient examined at bedside with no acute complaints.     Pain:  Bowel Movements:  Urination:  OOB:  PT:    REVIEW OF SYSTEMS:    CONSTITUTIONAL: No weakness, fevers or chills  EYES/ENT: No visual changes;  No vertigo or throat pain   NECK: No pain or stiffness  RESPIRATORY: No cough, wheezing, hemoptysis; No shortness of breath  CARDIOVASCULAR: No chest pain or palpitations  GASTROINTESTINAL: No abdominal or epigastric pain. No nausea, vomiting, or hematemesis; No diarrhea or constipation. No melena or hematochezia.  GENITOURINARY: No dysuria, frequency or hematuria  NEUROLOGICAL: No numbness or weakness  SKIN: No itching, rashes      MEDICATIONS  (STANDING):  atorvastatin 40 milliGRAM(s) Oral at bedtime  buMETAnide 1 milliGRAM(s) Oral daily  chlorhexidine 4% Liquid 1 Application(s) Topical <User Schedule>  dextrose 5%. 1000 milliLiter(s) (100 mL/Hr) IV Continuous <Continuous>  dextrose 5%. 1000 milliLiter(s) (50 mL/Hr) IV Continuous <Continuous>  dextrose 50% Injectable 25 Gram(s) IV Push once  dextrose 50% Injectable 12.5 Gram(s) IV Push once  dextrose 50% Injectable 25 Gram(s) IV Push once  gabapentin 200 milliGRAM(s) Oral three times a day  glucagon  Injectable 1 milliGRAM(s) IntraMuscular once  heparin   Injectable 5000 Unit(s) SubCutaneous every 8 hours  indomethacin Suppository 100 milliGRAM(s) Rectal once  insulin glargine Injectable (LANTUS) 10 Unit(s) SubCutaneous at bedtime  insulin lispro (ADMELOG) corrective regimen sliding scale   SubCutaneous every 6 hours  metoprolol succinate  milliGRAM(s) Oral daily  piperacillin/tazobactam IVPB.. 3.375 Gram(s) IV Intermittent every 8 hours    MEDICATIONS  (PRN):  acetaminophen     Tablet .. 650 milliGRAM(s) Oral every 6 hours PRN Temp greater or equal to 38C (100.4F), Mild Pain (1 - 3), Moderate Pain (4 - 6)  albuterol/ipratropium for Nebulization 3 milliLiter(s) Nebulizer every 6 hours PRN Shortness of Breath and/or Wheezing  aluminum hydroxide/magnesium hydroxide/simethicone Suspension 30 milliLiter(s) Oral every 6 hours PRN Dyspepsia  dextrose Oral Gel 15 Gram(s) Oral once PRN Blood Glucose LESS THAN 70 milliGRAM(s)/deciliter  morphine  - Injectable 1 milliGRAM(s) IV Push every 6 hours PRN Severe Pain (7 - 10)  sodium chloride 0.9% lock flush 10 milliLiter(s) IV Push every 1 hour PRN Pre/post blood products, medications, blood draw, and to maintain line patency      CAPILLARY BLOOD GLUCOSE      POCT Blood Glucose.: 139 mg/dL (2023 07:08)  POCT Blood Glucose.: 136 mg/dL (2023 21:17)  POCT Blood Glucose.: 178 mg/dL (2023 17:14)  POCT Blood Glucose.: 180 mg/dL (2023 11:44)    I&O's Summary    2023 07:01  -  2023 07:00  --------------------------------------------------------  IN: 920 mL / OUT: 600 mL / NET: 320 mL        VITALS:   T(C): 37.5 (23 @ 06:00), Max: 38.1 (23 @ 09:01)  HR: 79 (23 @ 06:00) (73 - 86)  BP: 105/67 (23 @ 06:00) (90/44 - 118/70)  RR: 20 (23 @ 06:00) (16 - 20)  SpO2: 93% (23 @ 06:00) (93% - 99%)    GENERAL: NAD, lying in bed comfortably  HEAD:  Atraumatic, normocephalic  EYES: EOMI, PERRLA, conjunctiva and sclera clear  ENT: Moist mucous membranes  NECK: Supple, no JVD  HEART: Regular rate and rhythm, no murmurs, rubs, or gallops  LUNGS: Unlabored respirations.  Clear to auscultation bilaterally, no crackles, wheezing, or rhonchi  ABDOMEN: Soft, nontender, nondistended, +BS  EXTREMITIES: 2+ peripheral pulses bilaterally. No clubbing, cyanosis, or edema  NERVOUS SYSTEM:  A&Ox3, no focal deficits   SKIN: No rashes or lesions    LABS:                        10.6   15.73 )-----------( 201      ( 2023 10:59 )             34.6         133<L>  |  96  |  47<H>  ----------------------------<  178<H>  4.2   |  22  |  3.18<H>    Ca    8.7      2023 11:00  Phos  4.0       Mg     1.8         TPro  5.9<L>  /  Alb  2.6<L>  /  TBili  0.4  /  DBili  x   /  AST  17  /  ALT  16  /  AlkPhos  112            Urinalysis Basic - ( 2023 14:44 )    Color: Yellow / Appearance: Slightly Turbid / S.022 / pH: x  Gluc: x / Ketone: Negative  / Bili: Negative / Urobili: Negative   Blood: x / Protein: 30 mg/dL / Nitrite: Negative   Leuk Esterase: Small / RBC: 39 /hpf / WBC 6 /HPF   Sq Epi: x / Non Sq Epi: 7 /hpf / Bacteria: Negative        Culture - Blood (collected 2023 23:59)  Source: .Blood Blood  Preliminary Report (2023 03:02):    No growth to date.    Culture - Blood (collected 2023 23:59)  Source: .Blood Blood-Venous  Preliminary Report (2023 03:01):    No growth to date.        RADIOLOGY & ADDITIONAL TESTS:  Results Reviewed:   Imaging Personally Reviewed:  Electrocardiogram Personally Reviewed:    COORDINATION OF CARE:  Care Discussed with Consultants/Other Providers [Y/N]:  Prior or Outpatient Records Reviewed [Y/N]:     PROGRESS NOTE:     Patient is a 82y old  Female who presents with a chief complaint of acute cholecystitis, DKA (2023 17:45)      ---------------------------------------------------  Dmitry Dangelo  PGY-1, Internal Medicine  Available on [employer] Teams  Pager: [phone] ([facility]), or [phone] ([facility])  ---------------------------------------------------    SUBJECTIVE / OVERNIGHT EVENTS:      No acute events overnight. Patient examined at bedside, complaining of abdominal pain though improved from yesterday. Denies any nausea or vomiting.       MEDICATIONS  (STANDING):  atorvastatin 40 milliGRAM(s) Oral at bedtime  buMETAnide 1 milliGRAM(s) Oral daily  chlorhexidine 4% Liquid 1 Application(s) Topical <User Schedule>  dextrose 5%. 1000 milliLiter(s) (100 mL/Hr) IV Continuous <Continuous>  dextrose 5%. 1000 milliLiter(s) (50 mL/Hr) IV Continuous <Continuous>  dextrose 50% Injectable 25 Gram(s) IV Push once  dextrose 50% Injectable 12.5 Gram(s) IV Push once  dextrose 50% Injectable 25 Gram(s) IV Push once  gabapentin 200 milliGRAM(s) Oral three times a day  glucagon  Injectable 1 milliGRAM(s) IntraMuscular once  heparin   Injectable 5000 Unit(s) SubCutaneous every 8 hours  indomethacin Suppository 100 milliGRAM(s) Rectal once  insulin glargine Injectable (LANTUS) 10 Unit(s) SubCutaneous at bedtime  insulin lispro (ADMELOG) corrective regimen sliding scale   SubCutaneous every 6 hours  metoprolol succinate  milliGRAM(s) Oral daily  piperacillin/tazobactam IVPB.. 3.375 Gram(s) IV Intermittent every 8 hours    MEDICATIONS  (PRN):  acetaminophen     Tablet .. 650 milliGRAM(s) Oral every 6 hours PRN Temp greater or equal to 38C (100.4F), Mild Pain (1 - 3), Moderate Pain (4 - 6)  albuterol/ipratropium for Nebulization 3 milliLiter(s) Nebulizer every 6 hours PRN Shortness of Breath and/or Wheezing  aluminum hydroxide/magnesium hydroxide/simethicone Suspension 30 milliLiter(s) Oral every 6 hours PRN Dyspepsia  dextrose Oral Gel 15 Gram(s) Oral once PRN Blood Glucose LESS THAN 70 milliGRAM(s)/deciliter  morphine  - Injectable 1 milliGRAM(s) IV Push every 6 hours PRN Severe Pain (7 - 10)  sodium chloride 0.9% lock flush 10 milliLiter(s) IV Push every 1 hour PRN Pre/post blood products, medications, blood draw, and to maintain line patency      CAPILLARY BLOOD GLUCOSE      POCT Blood Glucose.: 139 mg/dL (2023 07:08)  POCT Blood Glucose.: 136 mg/dL (2023 21:17)  POCT Blood Glucose.: 178 mg/dL (2023 17:14)  POCT Blood Glucose.: 180 mg/dL (2023 11:44)    I&O's Summary    2023 07:01  -  2023 07:00  --------------------------------------------------------  IN: 920 mL / OUT: 600 mL / NET: 320 mL        VITALS:   T(C): 37.5 (23 @ 06:00), Max: 38.1 (23 @ 09:01)  HR: 79 (23 @ 06:00) (73 - 86)  BP: 105/67 (23 @ 06:00) (90/44 - 118/70)  RR: 20 (23 @ 06:00) (16 - 20)  SpO2: 93% (23 @ 06:00) (93% - 99%)    GENERAL: Older woman in NAD, lying in bed comfortably  ENT: Moist mucous membranes  NECK: Supple   CHEST/LUNG: End expiratory wheezing appreciated bilaterally, faint inspiratory crackles  HEART: Regular rate and rhythm; No murmurs, rubs, or gallops  ABDOMEN: Generalized diffuse mild tenderness to palpation, bowel sounds present; Soft, Nontender, Nondistended. No hepatomegaly  EXTREMITIES: +bilateral UE lymphedema R > L, 1+ bilateral LE pitting edema, 2+ peripheral pulses bilaterally  NERVOUS SYSTEM:  Alert & Oriented X3, speech clear. No deficits   MSK: FROM all 4 extremities, full and equal strength  SKIN: No rashes or lesions      LABS:                          11.7   14.55 )-----------( 175      ( 2023 07:26 )             39.7         134<L>  |  96  |  51<H>  ----------------------------<  125<H>  4.2   |  20<L>  |  3.78<H>    Ca    9.1      2023 07:26  Phos  4.4       Mg     2.0         TPro  6.6  /  Alb  2.7<L>  /  TBili  0.4  /  DBili  x   /  AST  21  /  ALT  15  /  AlkPhos  109          Urinalysis Basic - ( 2023 14:44 )    Color: Yellow / Appearance: Slightly Turbid / S.022 / pH: x  Gluc: x / Ketone: Negative  / Bili: Negative / Urobili: Negative   Blood: x / Protein: 30 mg/dL / Nitrite: Negative   Leuk Esterase: Small / RBC: 39 /hpf / WBC 6 /HPF   Sq Epi: x / Non Sq Epi: 7 /hpf / Bacteria: Negative        Culture - Blood (collected 2023 23:59)  Source: .Blood Blood  Preliminary Report (2023 03:02):    No growth to date.    Culture - Blood (collected 2023 23:59)  Source: .Blood Blood-Venous  Preliminary Report (2023 03:01):    No growth to date.        RADIOLOGY & ADDITIONAL TESTS:  Results Reviewed:   Imaging Personally Reviewed:  Electrocardiogram Personally Reviewed:    COORDINATION OF CARE:  Care Discussed with Consultants/Other Providers [Y/N]:  Prior or Outpatient Records Reviewed [Y/N]:

## 2023-01-23 NOTE — PROGRESS NOTE ADULT - PROBLEM SELECTOR PLAN 2
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

## 2023-01-23 NOTE — PROGRESS NOTE ADULT - SUBJECTIVE AND OBJECTIVE BOX
SUBJECTIVE:   Seen and examined at bedside.     OBJECTIVE: T(C): 37.5 (23 @ 06:00), Max: 38.1 (23 @ 09:01)  HR: 79 (23 @ 06:00) (73 - 86)  BP: 105/67 (23 @ 06:00) (90/44 - 118/70)  RR: 20 (23 @ 06:00) (16 - 20)  SpO2: 93% (23 @ 06:00) (93% - 99%)  Wt(kg): --  I&O's Summary    2023 07:  -  2023 07:00  --------------------------------------------------------  IN: 920 mL / OUT: 600 mL / NET: 320 mL      I&O's Detail    2023 07:  -  2023 07:00  --------------------------------------------------------  IN:    IV PiggyBack: 100 mL    Oral Fluid: 820 mL  Total IN: 920 mL    OUT:    Voided (mL): 600 mL  Total OUT: 600 mL    Total NET: 320 mL    Physical Exam  Gen: NAD  Resp: on 2LNC, no increased WOB  Abd exam: obese, abd soft, moderately distended, mild tender to palpation in the RUQ    MEDICATIONS  (STANDING):  atorvastatin 40 milliGRAM(s) Oral at bedtime  buMETAnide 1 milliGRAM(s) Oral daily  chlorhexidine 4% Liquid 1 Application(s) Topical <User Schedule>  dextrose 5%. 1000 milliLiter(s) (100 mL/Hr) IV Continuous <Continuous>  dextrose 5%. 1000 milliLiter(s) (50 mL/Hr) IV Continuous <Continuous>  dextrose 50% Injectable 25 Gram(s) IV Push once  dextrose 50% Injectable 12.5 Gram(s) IV Push once  dextrose 50% Injectable 25 Gram(s) IV Push once  gabapentin 200 milliGRAM(s) Oral three times a day  glucagon  Injectable 1 milliGRAM(s) IntraMuscular once  heparin   Injectable 5000 Unit(s) SubCutaneous every 8 hours  indomethacin Suppository 100 milliGRAM(s) Rectal once  insulin glargine Injectable (LANTUS) 10 Unit(s) SubCutaneous at bedtime  insulin lispro (ADMELOG) corrective regimen sliding scale   SubCutaneous every 6 hours  metoprolol succinate  milliGRAM(s) Oral daily  piperacillin/tazobactam IVPB.. 3.375 Gram(s) IV Intermittent every 8 hours    MEDICATIONS  (PRN):  acetaminophen     Tablet .. 650 milliGRAM(s) Oral every 6 hours PRN Temp greater or equal to 38C (100.4F), Mild Pain (1 - 3), Moderate Pain (4 - 6)  albuterol/ipratropium for Nebulization 3 milliLiter(s) Nebulizer every 6 hours PRN Shortness of Breath and/or Wheezing  aluminum hydroxide/magnesium hydroxide/simethicone Suspension 30 milliLiter(s) Oral every 6 hours PRN Dyspepsia  dextrose Oral Gel 15 Gram(s) Oral once PRN Blood Glucose LESS THAN 70 milliGRAM(s)/deciliter  morphine  - Injectable 1 milliGRAM(s) IV Push every 6 hours PRN Severe Pain (7 - 10)  sodium chloride 0.9% lock flush 10 milliLiter(s) IV Push every 1 hour PRN Pre/post blood products, medications, blood draw, and to maintain line patency      LABS:                        10.6   15.73 )-----------( 201      ( 2023 10:59 )             34.6         133<L>  |  96  |  47<H>  ----------------------------<  178<H>  4.2   |  22  |  3.18<H>    Ca    8.7      2023 11:00  Phos  4.0       Mg     1.8         TPro  5.9<L>  /  Alb  2.6<L>  /  TBili  0.4  /  DBili  x   /  AST  17  /  ALT  16  /  AlkPhos  112        Urinalysis Basic - ( 2023 14:44 )    Color: Yellow / Appearance: Slightly Turbid / S.022 / pH: x  Gluc: x / Ketone: Negative  / Bili: Negative / Urobili: Negative   Blood: x / Protein: 30 mg/dL / Nitrite: Negative   Leuk Esterase: Small / RBC: 39 /hpf / WBC 6 /HPF   Sq Epi: x / Non Sq Epi: 7 /hpf / Bacteria: Negative

## 2023-01-23 NOTE — PROGRESS NOTE ADULT - PROBLEM SELECTOR PLAN 6
Diet: full liquid  DVT PPx: heparin subq   Dispo: pending clinical improvement Diet: NPO  DVT PPx: heparin subq   Dispo: pending clinical improvement

## 2023-01-23 NOTE — PROGRESS NOTE ADULT - ATTENDING COMMENTS
As above  abd pain several days after ERCP for stent above   fevers as well  CT performed, found to have fluid collection near GB neck likely perforated cholecystitis  IR eval for drain placement? Surgical eval  No endoscopic interventions needed at this time    Thank you for this interesting consult.  Please call the advanced GI service with any questions or concerns.

## 2023-01-23 NOTE — PROGRESS NOTE ADULT - ATTENDING COMMENTS
82F with h/o HTN, T2DM, HFpEF (EF 65%, normal LV in 2022), breast cancer s/p R partial mastectomy, rectal carcinoma s/p resection, CAD s/p CABG who presents for 1 week of intermittent abdominal pain associated with nausea and vomiting.    1. Acute cholecystitis with choledocholithiasis   2. Uncontrolled T2DM with hyperglycemia  3. TAO on CKD 3, pre-renal  4. Chronic hydronephrosis  5. HTN  6. h/o CABG    Daughter provided translation per patient preference. Abd pain improved.     - s/p ERCP by GI on 1/20:  large CBD stones on fluoro and relatively small sized ampulla; +small periampullary diverticula; s/p sphincterotomy + plastic CBD stent placement with successful drainage of bile/sludge. patient will need  repeat ERCP in 2-3 months. No IR intervention planned at this time.  - Abd pain after procedure w/ fever - CT suggestive of fluid collection. IR and surgery evaluated - rec MRI which is pending. cont zosyn for now  - Cr worsening - IVF. Nephro following. 2/2 infection/intermittent hypotension? US w/ unchanged R hydro     d/w daughter at bedside.     Rest as above.

## 2023-01-23 NOTE — PROGRESS NOTE ADULT - SUBJECTIVE AND OBJECTIVE BOX
Cardiovascular Disease Progress Note  Date of service: 01-23-23 @ 08:44  Covering for Dr. Hdez  Overnight events: No acute events overnight.  Pt found to have new fluid collection in abdomen. Pt is mild distress 2/2 abdominal pain.   Otherwise review of systems negative    Objective Findings:  T(C): 37.5 (01-23-23 @ 06:00), Max: 38.1 (01-22-23 @ 09:01)  HR: 79 (01-23-23 @ 06:00) (73 - 86)  BP: 105/67 (01-23-23 @ 06:00) (90/44 - 118/70)  RR: 20 (01-23-23 @ 06:00) (16 - 20)  SpO2: 93% (01-23-23 @ 06:00) (93% - 99%)  Wt(kg): --  Daily     Daily       Physical Exam:  Gen: NAD; Patient resting comfortably  HEENT: EOMI, Normocephalic/ atraumatic  CV: RRR, normal S1 + S2, no m/r/g  Lungs:  Normal respiratory effort; clear to auscultation bilaterally  Abd: soft, non-tender; bowel sounds present  Ext: No edema; warm and well perfused    Telemetry: N/a    Laboratory Data:                        11.7   14.55 )-----------( 175      ( 23 Jan 2023 07:26 )             39.7     01-23    134<L>  |  96  |  51<H>  ----------------------------<  125<H>  4.2   |  20<L>  |  3.78<H>    Ca    9.1      23 Jan 2023 07:26  Phos  4.4     01-23  Mg     2.0     01-23    TPro  6.6  /  Alb  2.7<L>  /  TBili  0.4  /  DBili  x   /  AST  21  /  ALT  15  /  AlkPhos  109  01-23              Inpatient Medications:  MEDICATIONS  (STANDING):  atorvastatin 40 milliGRAM(s) Oral at bedtime  buMETAnide 1 milliGRAM(s) Oral daily  chlorhexidine 4% Liquid 1 Application(s) Topical <User Schedule>  dextrose 5%. 1000 milliLiter(s) (100 mL/Hr) IV Continuous <Continuous>  dextrose 5%. 1000 milliLiter(s) (50 mL/Hr) IV Continuous <Continuous>  dextrose 50% Injectable 25 Gram(s) IV Push once  dextrose 50% Injectable 12.5 Gram(s) IV Push once  dextrose 50% Injectable 25 Gram(s) IV Push once  gabapentin 200 milliGRAM(s) Oral three times a day  glucagon  Injectable 1 milliGRAM(s) IntraMuscular once  heparin   Injectable 5000 Unit(s) SubCutaneous every 8 hours  indomethacin Suppository 100 milliGRAM(s) Rectal once  insulin glargine Injectable (LANTUS) 10 Unit(s) SubCutaneous at bedtime  insulin lispro (ADMELOG) corrective regimen sliding scale   SubCutaneous every 6 hours  metoprolol succinate  milliGRAM(s) Oral daily  piperacillin/tazobactam IVPB.. 3.375 Gram(s) IV Intermittent every 8 hours      Assessment:  80 year old woman with CAD s/p CABG in 2020, prior DVT on Eliquis, HTN, and compensated diastolic CHF presents with acute cholecystitis.     Plan of Care:    #Post-operative risk evaluation-  S/p ERCP with CBD stent. Tolerate procedure well.   Ms. Batista displays no signs or symptoms of acute coronary ischemia or decompensated CHF.  Admission EKG is sinus with pre-existing anteroseptal q-waves.  Recent echo noted- normal LV systolic function with no hemodynamically significant valvular disease.  IR reconsulted for possible drainage.   Pt may proceed from a cardiac standpoint.     #Compensated diastolic CHF-  Continue PO Diuretics    #CAD s/p CABG-  No signs of active ischemic.  ASA on hold  Statin as ordered.      #Sepsis   - 2/2 acute cholecystitis   - S/p CBD stent  - Abx as per primary team                Over 25 minutes spent on total encounter; more than 50% of the visit was spent counseling and/or coordinating care by the attending physician.      Vic Umana DO [facility]  Cardiovascular Disease  [phone]

## 2023-01-23 NOTE — PROGRESS NOTE ADULT - SUBJECTIVE AND OBJECTIVE BOX
Interval Events:   - Reports some RUQ abdominal discomfort  - Low grade fevers      Allergies:  CT scan dye (Hives)  penicillin (Unknown)        Hospital Medications:  acetaminophen     Tablet .. 650 milliGRAM(s) Oral every 6 hours PRN  albuterol/ipratropium for Nebulization 3 milliLiter(s) Nebulizer every 6 hours PRN  aluminum hydroxide/magnesium hydroxide/simethicone Suspension 30 milliLiter(s) Oral every 6 hours PRN  atorvastatin 40 milliGRAM(s) Oral at bedtime  chlorhexidine 4% Liquid 1 Application(s) Topical <User Schedule>  dextrose 5%. 1000 milliLiter(s) IV Continuous <Continuous>  dextrose 5%. 1000 milliLiter(s) IV Continuous <Continuous>  dextrose 50% Injectable 25 Gram(s) IV Push once  dextrose 50% Injectable 12.5 Gram(s) IV Push once  dextrose 50% Injectable 25 Gram(s) IV Push once  dextrose Oral Gel 15 Gram(s) Oral once PRN  gabapentin 200 milliGRAM(s) Oral three times a day  glucagon  Injectable 1 milliGRAM(s) IntraMuscular once  heparin   Injectable 5000 Unit(s) SubCutaneous every 8 hours  indomethacin Suppository 100 milliGRAM(s) Rectal once  insulin glargine Injectable (LANTUS) 10 Unit(s) SubCutaneous at bedtime  insulin lispro (ADMELOG) corrective regimen sliding scale   SubCutaneous every 6 hours  metoprolol succinate  milliGRAM(s) Oral daily  morphine  - Injectable 1 milliGRAM(s) IV Push every 6 hours PRN  piperacillin/tazobactam IVPB.. 3.375 Gram(s) IV Intermittent every 8 hours  sodium chloride 0.9% lock flush 10 milliLiter(s) IV Push every 1 hour PRN      PMHX/PSHX:  DM (diabetes mellitus)    HTN (hypertension)    Hyperlipidemia    Obesity (BMI 35.0-39.9 without comorbidity)    Breast CA, right    Colon cancer    Lymphedema of arm    Cancer    Rectal cancer    Breast CA, right    HTN (hypertension)    Diabetes mellitus    Lymphedema of arm    Hyperlipidemia    Rectal cancer    S/P chemotherapy, time since greater than 12 weeks    S/P radiation &gt; 12 weeks    Obese    Type II diabetes mellitus    CHF (congestive heart failure)    DVT of leg (deep venous thrombosis)    Elevated blood pressure reading in office with diagnosis of hypertension    Renal insufficiency    H/O mastectomy, right    History of tonsillectomy    S/P colectomy    S/P mastectomy, right    History of appendectomy    S/P ileostomy    S/P colon resection    S/P TKR (total knee replacement), right        Family history:  No pertinent family history in first degree relatives    No pertinent family history in first degree relatives    Family history of diabetes mellitus in mother    Family history of diabetes mellitus in son    Family history of heart attack (Child)        ROS: As per HPI, otherwise 14-point ROS reviewed and negative.      PHYSICAL EXAM:   Vital Signs:  Vital Signs Last 24 Hrs  T(C): 37.5 (2023 09:59), Max: 37.8 (2023 00:36)  T(F): 99.5 (2023 09:59), Max: 100 (2023 00:36)  HR: 82 (2023 09:59) (73 - 86)  BP: 113/61 (2023 09:59) (90/44 - 118/70)  BP(mean): --  RR: 20 (2023 09:59) (16 - 20)  SpO2: 99% (2023 09:59) (93% - 99%)    Parameters below as of 2023 09:59  Patient On (Oxygen Delivery Method): nasal cannula  O2 Flow (L/min): 2    Daily     Daily       23 @ 07:  -  23 @ 07:00  --------------------------------------------------------  IN: 1020 mL / OUT: 800 mL / NET: 220 mL    23 @ 07:01  -  23 @ 10:25  --------------------------------------------------------  IN: 120 mL / OUT: 0 mL / NET: 120 mL        GENERAL:  No acute distress, overweight female, lying in bed.   HEENT:  NCAT, no scleral icterus  CHEST: no resp distress  ABDOMEN:  Soft, mild tenderness in the RUQ, non-distended, no masses  EXTREMITIES:  No LE edema  NEURO:  Alert and oriented x 3, no tremor    LABS:                        11.7   14.55 )-----------( 175      ( 2023 07:26 )             39.7     Mean Cell Volume: 100.8 fl (- @ 07:26)        134<L>  |  96  |  51<H>  ----------------------------<  125<H>  4.2   |  20<L>  |  3.78<H>    Ca    9.1      2023 07:26  Phos  4.4       Mg     2.0         TPro  6.6  /  Alb  2.7<L>  /  TBili  0.4  /  DBili  x   /  AST  21  /  ALT  15  /  AlkPhos  109      LIVER FUNCTIONS - ( 2023 07:26 )  Alb: 2.7 g/dL / Pro: 6.6 g/dL / ALK PHOS: 109 U/L / ALT: 15 U/L / AST: 21 U/L / GGT: x             Urinalysis Basic - ( 2023 14:44 )    Color: Yellow / Appearance: Slightly Turbid / S.022 / pH: x  Gluc: x / Ketone: Negative  / Bili: Negative / Urobili: Negative   Blood: x / Protein: 30 mg/dL / Nitrite: Negative   Leuk Esterase: Small / RBC: 39 /hpf / WBC 6 /HPF   Sq Epi: x / Non Sq Epi: 7 /hpf / Bacteria: Negative      Amylase Serum--      Lipase serum78       Ammonia--                          11.7   14.55 )-----------( 175      ( 2023 07:26 )             39.7                         10.6   15.73 )-----------( 201      ( 2023 10:59 )             34.6                         11.5   13.38 )-----------( 188      ( 2023 06:59 )             37.6       Imaging:

## 2023-01-23 NOTE — PROGRESS NOTE ADULT - ASSESSMENT
Assessment: 82F with RUQ pain c/s acute cholecystitis and choledocholithiasis with significant co-morbidities including CAD, s/p CABG, CHF, poorly controlled DM, s/p ERCP (1/20). Doing ok, still poor operative candidate. Imaging reviewed and unable to define source of new collection vs inflammatory changes.     Plan:  - Patient remains a poor surgical candidate  - IR for possible drainage  - continue IVF + IV abx  - continue supportive care  - pain control PRN    [facility]  [id number]

## 2023-01-23 NOTE — PROGRESS NOTE ADULT - PROBLEM SELECTOR PLAN 1
Patient presenting with 1 week of abdominal pain, nausea, and vomiting  - CT A/P: acute cholecystitis with 1.2 cm common bile duct dilatation  - per surgery, not a surgical candidate given comorbidities   - s/p ERCP 1/20 : choledocholithiasis w/ large stone w/ sphincterotomy and biliary stent placement.   - patient now with recurrent abdominal pain, and leukocytosis     Plan:  > will obtain CT A/P without contrast, patient cannot get IV contrast given worsening TAO on CKD and cannot provide pre-contrast fluid resuscitation given fluid overload in the setting of heart failure  > if no improvement, will reconsult IR tomorrow for reevaluation of percutaneous cholecystostomy  > continue with zosyn for 7 day course   > pain/nausea control with tylenol and maalox  > lipase WNL  > AVOID zofran given QTc 518  > trend CMP, coags, otherwise stable  > advance diet as tolerated   > Patient will need repeat ERCP in 2-3 months. Patient presenting with 1 week of abdominal pain, nausea, and vomiting  - Initial CT A/P: acute cholecystitis with 1.2 cm common bile duct dilatation  - per surgery, not a surgical candidate given comorbidities   - s/p ERCP 1/20 : choledocholithiasis w/ large stone w/ sphincterotomy and biliary stent placement.   - patient now with recurrent abdominal pain, and leukocytosis, repeat CT A/P non con concerning for fluid collection around the neck of the gallbladder, possible perf  - IR recommending repeat imaging with MRI prior to intervention given possible stable inflammatory changes on imaging    Plan:  > f/u MR abdomen with MRCP, if concern for perf will reconsult IR for drain placement  > continue with zosyn  > pain/nausea control with tylenol and maalox  > lipase WNL  > AVOID zofran given QTc 518  > trend CMP, coags, otherwise stable  > Patient will need repeat ERCP in 2-3 months for stent removal

## 2023-01-23 NOTE — PROVIDER CONTACT NOTE (OTHER) - ACTION/TREATMENT ORDERED:
MD stated she will change the order to Fingerstick Q6, change NPO to NPO x meds, not to give Bumex until after the results of the BMP, to give tylenol for the fever and will order RVP for cough

## 2023-01-23 NOTE — PROGRESS NOTE ADULT - ASSESSMENT
on room air  afebrile  NAD  updated daughter at bedside    acetaminophen     Tablet .. 650 milliGRAM(s) Oral every 6 hours PRN  aluminum hydroxide/magnesium hydroxide/simethicone Suspension 30 milliLiter(s) Oral every 6 hours PRN  aspirin  chewable 81 milliGRAM(s) Oral daily  atorvastatin 40 milliGRAM(s) Oral at bedtime  chlorhexidine 4% Liquid 1 Application(s) Topical <User Schedule>  dextrose 5%. 1000 milliLiter(s) IV Continuous <Continuous>  dextrose 5%. 1000 milliLiter(s) IV Continuous <Continuous>  dextrose 50% Injectable 25 Gram(s) IV Push once  dextrose 50% Injectable 12.5 Gram(s) IV Push once  dextrose 50% Injectable 25 Gram(s) IV Push once  dextrose Oral Gel 15 Gram(s) Oral once PRN  gabapentin 200 milliGRAM(s) Oral three times a day  glucagon  Injectable 1 milliGRAM(s) IntraMuscular once  heparin   Injectable 5000 Unit(s) SubCutaneous every 8 hours  indomethacin Suppository 100 milliGRAM(s) Rectal once  insulin glargine Injectable (LANTUS) 10 Unit(s) SubCutaneous at bedtime  insulin lispro (ADMELOG) corrective regimen sliding scale   SubCutaneous Before meals and at bedtime  metoprolol succinate  milliGRAM(s) Oral daily  morphine  - Injectable 1 milliGRAM(s) IV Push every 6 hours PRN  piperacillin/tazobactam IVPB.. 3.375 Gram(s) IV Intermittent every 8 hours  sodium chloride 0.9% lock flush 10 milliLiter(s) IV Push every 1 hour PRN  spironolactone 12.5 milliGRAM(s) Oral daily      VITAL:  T(C): , Max: 37.3 (01-21-23 @ 13:21)  T(F): , Max: 99.1 (01-21-23 @ 13:21)  HR: 88 (01-21-23 @ 13:21)  BP: 100/60 (01-21-23 @ 13:21)  BP(mean): --  RR: 18 (01-21-23 @ 13:21)  SpO2: 96% (01-21-23 @ 13:21)  Wt(kg): --    01-20-23 @ 07:01  -  01-21-23 @ 07:00  --------------------------------------------------------  IN: 440 mL / OUT: 50 mL / NET: 390 mL    01-21-23 @ 07:01  -  01-21-23 @ 14:41  --------------------------------------------------------  IN: 850 mL / OUT: 250 mL / NET: 600 mL        PHYSICAL EXAM:  General: NAD; Alert  HEENT:  NCAT; PERRLA  Neck: No JVD; supple  Respiratory: CTA-b/l  Cardiac: RRR s1s2  Gastrointestinal: BS+, soft, NT/ND  Urologic: No chen  Extremities: No peripheral edema  Access:     LABS:                          11.5   13.38 )-----------( 188      ( 21 Jan 2023 06:59 )             37.6     Na(137)/K(4.0)/Cl(100)/HCO3(26)/BUN(43)/Cr(2.67)Glu(151)/Ca(8.7)/Mg(1.9)/PO4(3.8)    01-21 @ 06:59  Na(138)/K(4.1)/Cl(101)/HCO3(26)/BUN(38)/Cr(2.44)Glu(145)/Ca(9.0)/Mg(1.9)/PO4(3.5)    01-20 @ 06:35  Na(139)/K(4.1)/Cl(100)/HCO3(26)/BUN(26)/Cr(1.62)Glu(203)/Ca(9.4)/Mg(--)/PO4(--)    01-19 @ 06:58  Na(136)/K(4.2)/Cl(98)/HCO3(24)/BUN(31)/Cr(1.58)Glu(323)/Ca(9.4)/Mg(--)/PO4(--)    01-18 @ 16:14      Sodium, Random Urine: 27 mmol/L (01-21 @ 06:50)  Osmolality, Random Urine: 368 mos/kg (01-21 @ 06:50)  Potassium, Random Urine: 53 mmol/L (01-21 @ 06:50)        ASSESSMENT/PLAN  Patient is a 80 year old F with a PMHx of HTN, HLD, T2DM, HFpEF (EF 65% X/2022), breast cancer s/p R partial mastectomy, rectal carcinoma s/p resection, CAD s/p CABG (11/2020) who presents for 1 day of progressively worsening abdominal pain associated with nausea and NBNB vomiting. 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  low nephron mass   non oliguric harleen likely in the view of relative  hypotension   CVS----BP  soft   GI:  acute cholecystitis --- ERCP with stent placement  1/20/23  biliary leak ---IR and surgical evaluation     RECOMMENDATION:  1)Renal: worsening cr   avoid nephrotoxic medication  monitor off diuretics , give Ns 50ccc/h for 6 h ,monitor respiratory status  daily bmp  2)CVS: euvolemic ,BP soft ,  metoprolol  with holding parameters   3)ID:on IV abx  4) GI ---  on board sp ERCP biliary leak           [facility]  Office: [phone]   on room air  afebrile  NAD  updated daughter at bedside    acetaminophen     Tablet .. 650 milliGRAM(s) Oral every 6 hours PRN  aluminum hydroxide/magnesium hydroxide/simethicone Suspension 30 milliLiter(s) Oral every 6 hours PRN  aspirin  chewable 81 milliGRAM(s) Oral daily  atorvastatin 40 milliGRAM(s) Oral at bedtime  chlorhexidine 4% Liquid 1 Application(s) Topical <User Schedule>  dextrose 5%. 1000 milliLiter(s) IV Continuous <Continuous>  dextrose 5%. 1000 milliLiter(s) IV Continuous <Continuous>  dextrose 50% Injectable 25 Gram(s) IV Push once  dextrose 50% Injectable 12.5 Gram(s) IV Push once  dextrose 50% Injectable 25 Gram(s) IV Push once  dextrose Oral Gel 15 Gram(s) Oral once PRN  gabapentin 200 milliGRAM(s) Oral three times a day  glucagon  Injectable 1 milliGRAM(s) IntraMuscular once  heparin   Injectable 5000 Unit(s) SubCutaneous every 8 hours  indomethacin Suppository 100 milliGRAM(s) Rectal once  insulin glargine Injectable (LANTUS) 10 Unit(s) SubCutaneous at bedtime  insulin lispro (ADMELOG) corrective regimen sliding scale   SubCutaneous Before meals and at bedtime  metoprolol succinate  milliGRAM(s) Oral daily  morphine  - Injectable 1 milliGRAM(s) IV Push every 6 hours PRN  piperacillin/tazobactam IVPB.. 3.375 Gram(s) IV Intermittent every 8 hours  sodium chloride 0.9% lock flush 10 milliLiter(s) IV Push every 1 hour PRN  spironolactone 12.5 milliGRAM(s) Oral daily      VITAL:  T(C): , Max: 37.3 (01-21-23 @ 13:21)  T(F): , Max: 99.1 (01-21-23 @ 13:21)  HR: 88 (01-21-23 @ 13:21)  BP: 100/60 (01-21-23 @ 13:21)  BP(mean): --  RR: 18 (01-21-23 @ 13:21)  SpO2: 96% (01-21-23 @ 13:21)  Wt(kg): --    01-20-23 @ 07:01  -  01-21-23 @ 07:00  --------------------------------------------------------  IN: 440 mL / OUT: 50 mL / NET: 390 mL    01-21-23 @ 07:01  -  01-21-23 @ 14:41  --------------------------------------------------------  IN: 850 mL / OUT: 250 mL / NET: 600 mL        PHYSICAL EXAM:  General: NAD; Alert  HEENT:  NCAT; PERRLA  Neck: No JVD; supple  Respiratory: CTA-b/l  Cardiac: RRR s1s2  Gastrointestinal: BS+, soft, NT/ND  Urologic: No chen  Extremities: No peripheral edema  Access:     LABS:                          11.5   13.38 )-----------( 188      ( 21 Jan 2023 06:59 )             37.6     Na(137)/K(4.0)/Cl(100)/HCO3(26)/BUN(43)/Cr(2.67)Glu(151)/Ca(8.7)/Mg(1.9)/PO4(3.8)    01-21 @ 06:59  Na(138)/K(4.1)/Cl(101)/HCO3(26)/BUN(38)/Cr(2.44)Glu(145)/Ca(9.0)/Mg(1.9)/PO4(3.5)    01-20 @ 06:35  Na(139)/K(4.1)/Cl(100)/HCO3(26)/BUN(26)/Cr(1.62)Glu(203)/Ca(9.4)/Mg(--)/PO4(--)    01-19 @ 06:58  Na(136)/K(4.2)/Cl(98)/HCO3(24)/BUN(31)/Cr(1.58)Glu(323)/Ca(9.4)/Mg(--)/PO4(--)    01-18 @ 16:14      Sodium, Random Urine: 27 mmol/L (01-21 @ 06:50)  Osmolality, Random Urine: 368 mos/kg (01-21 @ 06:50)  Potassium, Random Urine: 53 mmol/L (01-21 @ 06:50)        ASSESSMENT/PLAN  Patient is a 80 year old F with a PMHx of HTN, HLD, T2DM, HFpEF (EF 65% X/2022), breast cancer s/p R partial mastectomy, rectal carcinoma s/p resection, CAD s/p CABG (11/2020) who presents for 1 day of progressively worsening abdominal pain associated with nausea and NBNB vomiting. 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  low nephron mass   non oliguric harleen likely in the view of relative  hypotension   CVS----BP  soft   GI:  acute cholecystitis --- ERCP with stent placement  1/20/23  biliary leak ---IR and surgical evaluation     RECOMMENDATION:  1)Renal: worsening cr   avoid nephrotoxic medication  monitor off diuretics , give Ns 50ccc/h for 6 h ,monitor respiratory status  daily bmp  2)CVS: euvolemic ,BP soft ,  metoprolol  with holding parameters   3)ID:on IV abx  4) GI ---  on board sp ERCP biliary leak           [facility]  Office: [phone]   no distress on NC   afebrile  stated breathing ok      acetaminophen     Tablet .. 650 milliGRAM(s) Oral every 6 hours PRN  aluminum hydroxide/magnesium hydroxide/simethicone Suspension 30 milliLiter(s) Oral every 6 hours PRN  aspirin  chewable 81 milliGRAM(s) Oral daily  atorvastatin 40 milliGRAM(s) Oral at bedtime  chlorhexidine 4% Liquid 1 Application(s) Topical <User Schedule>  dextrose 5%. 1000 milliLiter(s) IV Continuous <Continuous>  dextrose 5%. 1000 milliLiter(s) IV Continuous <Continuous>  dextrose 50% Injectable 25 Gram(s) IV Push once  dextrose 50% Injectable 12.5 Gram(s) IV Push once  dextrose 50% Injectable 25 Gram(s) IV Push once  dextrose Oral Gel 15 Gram(s) Oral once PRN  gabapentin 200 milliGRAM(s) Oral three times a day  glucagon  Injectable 1 milliGRAM(s) IntraMuscular once  heparin   Injectable 5000 Unit(s) SubCutaneous every 8 hours  indomethacin Suppository 100 milliGRAM(s) Rectal once  insulin glargine Injectable (LANTUS) 10 Unit(s) SubCutaneous at bedtime  insulin lispro (ADMELOG) corrective regimen sliding scale   SubCutaneous Before meals and at bedtime  metoprolol succinate  milliGRAM(s) Oral daily  morphine  - Injectable 1 milliGRAM(s) IV Push every 6 hours PRN  piperacillin/tazobactam IVPB.. 3.375 Gram(s) IV Intermittent every 8 hours  sodium chloride 0.9% lock flush 10 milliLiter(s) IV Push every 1 hour PRN  spironolactone 12.5 milliGRAM(s) Oral daily      VITAL:  T(C): , Max: 37.3 (01-21-23 @ 13:21)  T(F): , Max: 99.1 (01-21-23 @ 13:21)  HR: 88 (01-21-23 @ 13:21)  BP: 100/60 (01-21-23 @ 13:21)  BP(mean): --  RR: 18 (01-21-23 @ 13:21)  SpO2: 96% (01-21-23 @ 13:21)  Wt(kg): --    01-20-23 @ 07:01  -  01-21-23 @ 07:00  --------------------------------------------------------  IN: 440 mL / OUT: 50 mL / NET: 390 mL    01-21-23 @ 07:01  -  01-21-23 @ 14:41  --------------------------------------------------------  IN: 850 mL / OUT: 250 mL / NET: 600 mL        PHYSICAL EXAM:  General: NAD; Alert  HEENT:  NCAT; PERRLA  Neck: No JVD; supple  Respiratory: CTA-b/l  Cardiac: RRR s1s2  Gastrointestinal: BS+, soft, NT/ND  Urologic: No chen  Extremities: No peripheral edema  Access:     LABS:                          11.5   13.38 )-----------( 188      ( 21 Jan 2023 06:59 )             37.6     Na(137)/K(4.0)/Cl(100)/HCO3(26)/BUN(43)/Cr(2.67)Glu(151)/Ca(8.7)/Mg(1.9)/PO4(3.8)    01-21 @ 06:59  Na(138)/K(4.1)/Cl(101)/HCO3(26)/BUN(38)/Cr(2.44)Glu(145)/Ca(9.0)/Mg(1.9)/PO4(3.5)    01-20 @ 06:35  Na(139)/K(4.1)/Cl(100)/HCO3(26)/BUN(26)/Cr(1.62)Glu(203)/Ca(9.4)/Mg(--)/PO4(--)    01-19 @ 06:58  Na(136)/K(4.2)/Cl(98)/HCO3(24)/BUN(31)/Cr(1.58)Glu(323)/Ca(9.4)/Mg(--)/PO4(--)    01-18 @ 16:14      Sodium, Random Urine: 27 mmol/L (01-21 @ 06:50)  Osmolality, Random Urine: 368 mos/kg (01-21 @ 06:50)  Potassium, Random Urine: 53 mmol/L (01-21 @ 06:50)    IMAGES:  CONTRAST/COMPLICATIONS:  IV Contrast: NONE  Oral Contrast: NONE  Complications: None reported at time of study completion    PROCEDURE:  CT of the Abdomen and Pelvis was performed.  Sagittal and coronal reformats were performed.    FINDINGS:  LOWER CHEST: Small bilateral pleural effusions. Cardiomegaly and coronary   artery calcifications. Partially imaged sternotomy.    LIVER: Within normal limits.  BILE DUCTS: Biliary stent in place. No intrahepatic biliary ductal   dilatation. Persistent CBD stones adjacent to the stent.  GALLBLADDER: Cholelithiasis. Ill-defined collection in the region of the   gallbladder neck and common bile duct measuring approximately 4.6 x 3.0   cm also encompassing the secondportion of the duodenum.  SPLEEN: Within normal limits.  PANCREAS: Fatty atrophy.  ADRENALS: Within normal limits.  KIDNEYS/URETERS: Unchanged chronically hydronephrotic right kidney with   right renal atrophy.    BLADDER: Within normal limits.  REPRODUCTIVE ORGANS: Myomatous uterus.    BOWEL: Rectosigmoid and small bowel anastomoses. No bowel obstruction.  PERITONEUM: No ascites.  VESSELS: Atherosclerotic changes.  RETROPERITONEUM/LYMPH NODES: No lymphadenopathy.  ABDOMINAL WALL: Redemonstrated small to moderate fat-containing ventral   hernia.  BONES: Right total hip arthroplasty. Degenerative changes.    IMPRESSION:  New ill-defined collection in the region of the gallbladder neck and   common bile duct also encompassing the second portionof the duodenum.   Findings may be related to bile leak, perforated cholecystitis, or   duodenal hematoma. Recommend further evaluation with MRI.    Interval placement of a stent within the common bile duct with a residual   CBD stones adjacent to the stent.    --- End of Report ---            ASSESSMENT/PLAN  Patient is a 80 year old F with a PMHx of HTN, HLD, T2DM, HFpEF (EF 65% X/2022), breast cancer s/p R partial mastectomy, rectal carcinoma s/p resection, CAD s/p CABG (11/2020) who presents for 1 day of progressively worsening abdominal pain associated with nausea and NBNB vomiting. 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

## 2023-01-23 NOTE — PROGRESS NOTE ADULT - PROBLEM SELECTOR PLAN 4
Patient with HFpEF, follows with Dr. Hagan   - recent admission 8/2022 for ADHF and fluid overload  -  upon dc, 602 upon admission  - TTE 8/2022: EF 62%, normal LV/RV systolic function, elevated LV filling pressures, mild MR    Plan:  > strict home parameters given tenuous volume status and intermittent hypotension  > hold bumex given worsening TAO, will reassess tomorrow

## 2023-01-23 NOTE — PROGRESS NOTE ADULT - ATTENDING COMMENTS
DATE OF SERVICE: 01-23-23 @ 09:33    Seen and examined. Had pain yesterday. Tm 100.5 yesterday, BP improved    WBC 14.5, hepatic panel WNL  CT imaging reviewed, new ill defined collection at GB neck  Abdomen soft, mild RUQ TTP    Would recommend IR eval for possible drainage of collection/perc rissa  She is a poor operative candidate given her multiple comorbidities  NPO for now pending additional imaging  IV abx

## 2023-01-23 NOTE — PROGRESS NOTE ADULT - ASSESSMENT
82 year old F with a PMHx of HTN, HLD, T2DM, HFpEF (EF 65%), breast cancer s/p R partial mastectomy, rectal carcinoma s/p resection, CAD s/p CABG (11/2020) who presents for 1 week of progressively worsening intermittent abdominal pain as well as N/V - found to have acute rissa, choledocho and DKA.    Impression:  # Abdominal collection - suggestive of a perforated gallbladder as seen on CT.   #Choledocholithiasis - as seen on CT with CBD dilation. S/p ERCP on 1/20 - Choledocholithiasis was found, with at least one large stone. Biliary sphincterotomy performed, but limited size due to periampullary diverticulum. 10 Fr x 5 cm biliary stent placed.  #Acute cholecystitis - deemed not a surgical candidate.   #DKA, elevated lactate - elevated BHB, hyperglycemia, HAGMA   #HFpEF  #DVT on eliquis   # CKD    Recommendations:   - Antibiotics per primary team  - Consider IR re-consult for drainage of the collection  - Trend CMP, CBC daily  - Patient will need repeat ERCP in 2-3 months    Please note that the recommendations are not final until attested by an attending.    Thank you for involving us in the care of this patient. Please reach out if any further questions.    Sam Melton, PGY-6  Gastroenterology/Hepatology Fellow    Available on [employer] Teams  After 5PM/Weekends, please contact the on-call GI fellow: [phone]

## 2023-01-24 LAB
ALBUMIN SERPL ELPH-MCNC: 2.5 G/DL — LOW (ref 3.3–5)
ALP SERPL-CCNC: 118 U/L — SIGNIFICANT CHANGE UP (ref 40–120)
ALT FLD-CCNC: 11 U/L — SIGNIFICANT CHANGE UP (ref 10–45)
ANION GAP SERPL CALC-SCNC: 14 MMOL/L — SIGNIFICANT CHANGE UP (ref 5–17)
AST SERPL-CCNC: 16 U/L — SIGNIFICANT CHANGE UP (ref 10–40)
BILIRUB SERPL-MCNC: 0.3 MG/DL — SIGNIFICANT CHANGE UP (ref 0.2–1.2)
BUN SERPL-MCNC: 50 MG/DL — HIGH (ref 7–23)
CALCIUM SERPL-MCNC: 8.6 MG/DL — SIGNIFICANT CHANGE UP (ref 8.4–10.5)
CHLORIDE SERPL-SCNC: 100 MMOL/L — SIGNIFICANT CHANGE UP (ref 96–108)
CO2 SERPL-SCNC: 22 MMOL/L — SIGNIFICANT CHANGE UP (ref 22–31)
CREAT SERPL-MCNC: 3.8 MG/DL — HIGH (ref 0.5–1.3)
EGFR: 11 ML/MIN/1.73M2 — LOW
GLUCOSE BLDC GLUCOMTR-MCNC: 102 MG/DL — HIGH (ref 70–99)
GLUCOSE BLDC GLUCOMTR-MCNC: 147 MG/DL — HIGH (ref 70–99)
GLUCOSE BLDC GLUCOMTR-MCNC: 148 MG/DL — HIGH (ref 70–99)
GLUCOSE BLDC GLUCOMTR-MCNC: 170 MG/DL — HIGH (ref 70–99)
GLUCOSE SERPL-MCNC: 117 MG/DL — HIGH (ref 70–99)
HCT VFR BLD CALC: 32.5 % — LOW (ref 34.5–45)
HGB BLD-MCNC: 10 G/DL — LOW (ref 11.5–15.5)
MAGNESIUM SERPL-MCNC: 2 MG/DL — SIGNIFICANT CHANGE UP (ref 1.6–2.6)
MCHC RBC-ENTMCNC: 29.4 PG — SIGNIFICANT CHANGE UP (ref 27–34)
MCHC RBC-ENTMCNC: 30.8 GM/DL — LOW (ref 32–36)
MCV RBC AUTO: 95.6 FL — SIGNIFICANT CHANGE UP (ref 80–100)
NRBC # BLD: 0 /100 WBCS — SIGNIFICANT CHANGE UP (ref 0–0)
PHOSPHATE SERPL-MCNC: 4.3 MG/DL — SIGNIFICANT CHANGE UP (ref 2.5–4.5)
PLATELET # BLD AUTO: 209 K/UL — SIGNIFICANT CHANGE UP (ref 150–400)
POTASSIUM SERPL-MCNC: 4.1 MMOL/L — SIGNIFICANT CHANGE UP (ref 3.5–5.3)
POTASSIUM SERPL-SCNC: 4.1 MMOL/L — SIGNIFICANT CHANGE UP (ref 3.5–5.3)
PROT SERPL-MCNC: 6 G/DL — SIGNIFICANT CHANGE UP (ref 6–8.3)
RBC # BLD: 3.4 M/UL — LOW (ref 3.8–5.2)
RBC # FLD: 15.9 % — HIGH (ref 10.3–14.5)
SODIUM SERPL-SCNC: 136 MMOL/L — SIGNIFICANT CHANGE UP (ref 135–145)
WBC # BLD: 14.82 K/UL — HIGH (ref 3.8–10.5)
WBC # FLD AUTO: 14.82 K/UL — HIGH (ref 3.8–10.5)

## 2023-01-24 PROCEDURE — 99233 SBSQ HOSP IP/OBS HIGH 50: CPT | Mod: GC

## 2023-01-24 RX ORDER — PIPERACILLIN AND TAZOBACTAM 4; .5 G/20ML; G/20ML
3.38 INJECTION, POWDER, LYOPHILIZED, FOR SOLUTION INTRAVENOUS EVERY 12 HOURS
Refills: 0 | Status: COMPLETED | OUTPATIENT
Start: 2023-01-24 | End: 2023-01-31

## 2023-01-24 RX ORDER — INSULIN LISPRO 100/ML
VIAL (ML) SUBCUTANEOUS
Refills: 0 | Status: DISCONTINUED | OUTPATIENT
Start: 2023-01-24 | End: 2023-01-25

## 2023-01-24 RX ADMIN — PIPERACILLIN AND TAZOBACTAM 25 GRAM(S): 4; .5 INJECTION, POWDER, LYOPHILIZED, FOR SOLUTION INTRAVENOUS at 02:27

## 2023-01-24 RX ADMIN — ATORVASTATIN CALCIUM 40 MILLIGRAM(S): 80 TABLET, FILM COATED ORAL at 21:51

## 2023-01-24 RX ADMIN — CHLORHEXIDINE GLUCONATE 1 APPLICATION(S): 213 SOLUTION TOPICAL at 06:04

## 2023-01-24 RX ADMIN — PIPERACILLIN AND TAZOBACTAM 25 GRAM(S): 4; .5 INJECTION, POWDER, LYOPHILIZED, FOR SOLUTION INTRAVENOUS at 17:28

## 2023-01-24 RX ADMIN — GABAPENTIN 200 MILLIGRAM(S): 400 CAPSULE ORAL at 14:36

## 2023-01-24 RX ADMIN — INSULIN GLARGINE 10 UNIT(S): 100 INJECTION, SOLUTION SUBCUTANEOUS at 21:51

## 2023-01-24 RX ADMIN — PIPERACILLIN AND TAZOBACTAM 25 GRAM(S): 4; .5 INJECTION, POWDER, LYOPHILIZED, FOR SOLUTION INTRAVENOUS at 10:23

## 2023-01-24 RX ADMIN — GABAPENTIN 200 MILLIGRAM(S): 400 CAPSULE ORAL at 06:04

## 2023-01-24 RX ADMIN — GABAPENTIN 200 MILLIGRAM(S): 400 CAPSULE ORAL at 21:51

## 2023-01-24 RX ADMIN — Medication 3 MILLILITER(S): at 17:29

## 2023-01-24 RX ADMIN — HEPARIN SODIUM 5000 UNIT(S): 5000 INJECTION INTRAVENOUS; SUBCUTANEOUS at 06:04

## 2023-01-24 RX ADMIN — Medication 2: at 16:27

## 2023-01-24 NOTE — CHART NOTE - NSCHARTNOTEFT_GEN_A_CORE
IR Consult: Revaluation for percutaneous cholecystostomy   Assessment/Plan: 82y Female with pmh of HTN, HLD, T2DM, HFpEF (EF 65% X/2022), breast cancer s/p R partial mastectomy, rectal carcinoma s/p resection, CAD s/p CABG who presents for 1 week of intermittent abdominal pain associated with nausea and vomiting, found to have acute cholecystitis with choledocholithiasis as well as mild DKA. Patient is now s/p ERCP with large CBD stones on fluoro and relatively small sized ampulla; +small periampullary diverticula; s/p sphincterotomy with sphictertome + plastic CBD stent placement with successful drainage of bile/sludge. Balloon extraction not done due to small sized ampulla relative to large CBD stones. GI recommendation to come back in 2-3 months for stone and stent removal. IR consulted for possible cholecystostomy tube placement previously but deferred given patient improvement post ERCP.     Patient now with abdominal pain and leukocytosis. MRI showing multiloculated collection at GB neck. IR reconsulted for drainage.     < from: MR Abdomen No Cont (01.23.23 @ 18:13) >    IMPRESSION:  1.  Acute cholecystitis.  2.  A 5.5 cm multiloculated fluid collection adjacent to the gallbladder   neck could be due to gallbladder perforation, limited in evaluation   without IV contrast.    < end of copied text >      - case reviewed and approved for Tomorrow 1/25.   - please place IR procedure order under Dr. Martinez.   - STAT labs in AM (cbc,coags, bmp, T&S)  -Continue to hold a/c.   - NPO tonight at 11pm  - COVID PCR results within 5 days of planned procedure  - d/w primary team  -Case and imaging reviewed with Dr. Martinez.

## 2023-01-24 NOTE — PROGRESS NOTE ADULT - SUBJECTIVE AND OBJECTIVE BOX
PROGRESS NOTE:     Patient is a 82y old  Female who presents with a chief complaint of acute cholecystitis, DKA (23 Jan 2023 14:05)      ---------------------------------------------------  Dmitry Dangelo  PGY-1, Internal Medicine  Available on Microsoft Teams  Pager: [phone] ([facility]), or [phone] ([facility])  ---------------------------------------------------    SUBJECTIVE / OVERNIGHT EVENTS:    No acute events overnight. Patient examined at bedside with no acute complaints.     Pain:  Bowel Movements:  Urination:  OOB:  PT:    REVIEW OF SYSTEMS:    CONSTITUTIONAL: No weakness, fevers or chills  EYES/ENT: No visual changes;  No vertigo or throat pain   NECK: No pain or stiffness  RESPIRATORY: No cough, wheezing, hemoptysis; No shortness of breath  CARDIOVASCULAR: No chest pain or palpitations  GASTROINTESTINAL: No abdominal or epigastric pain. No nausea, vomiting, or hematemesis; No diarrhea or constipation. No melena or hematochezia.  GENITOURINARY: No dysuria, frequency or hematuria  NEUROLOGICAL: No numbness or weakness  SKIN: No itching, rashes      MEDICATIONS  (STANDING):  atorvastatin 40 milliGRAM(s) Oral at bedtime  chlorhexidine 4% Liquid 1 Application(s) Topical <User Schedule>  dextrose 5%. 1000 milliLiter(s) (100 mL/Hr) IV Continuous <Continuous>  dextrose 5%. 1000 milliLiter(s) (50 mL/Hr) IV Continuous <Continuous>  dextrose 50% Injectable 25 Gram(s) IV Push once  dextrose 50% Injectable 12.5 Gram(s) IV Push once  dextrose 50% Injectable 25 Gram(s) IV Push once  gabapentin 200 milliGRAM(s) Oral three times a day  glucagon  Injectable 1 milliGRAM(s) IntraMuscular once  heparin   Injectable 5000 Unit(s) SubCutaneous every 8 hours  indomethacin Suppository 100 milliGRAM(s) Rectal once  insulin glargine Injectable (LANTUS) 10 Unit(s) SubCutaneous at bedtime  insulin lispro (ADMELOG) corrective regimen sliding scale   SubCutaneous every 6 hours  metoprolol succinate  milliGRAM(s) Oral daily  piperacillin/tazobactam IVPB.. 3.375 Gram(s) IV Intermittent every 8 hours  sodium chloride 0.9%. 300 milliLiter(s) (50 mL/Hr) IV Continuous <Continuous>    MEDICATIONS  (PRN):  acetaminophen     Tablet .. 650 milliGRAM(s) Oral every 6 hours PRN Temp greater or equal to 38C (100.4F), Mild Pain (1 - 3), Moderate Pain (4 - 6)  albuterol/ipratropium for Nebulization 3 milliLiter(s) Nebulizer every 6 hours PRN Shortness of Breath and/or Wheezing  aluminum hydroxide/magnesium hydroxide/simethicone Suspension 30 milliLiter(s) Oral every 6 hours PRN Dyspepsia  dextrose Oral Gel 15 Gram(s) Oral once PRN Blood Glucose LESS THAN 70 milliGRAM(s)/deciliter  morphine  - Injectable 1 milliGRAM(s) IV Push every 6 hours PRN Severe Pain (7 - 10)  sodium chloride 0.9% lock flush 10 milliLiter(s) IV Push every 1 hour PRN Pre/post blood products, medications, blood draw, and to maintain line patency      CAPILLARY BLOOD GLUCOSE      POCT Blood Glucose.: 111 mg/dL (23 Jan 2023 21:15)  POCT Blood Glucose.: 120 mg/dL (23 Jan 2023 18:20)  POCT Blood Glucose.: 130 mg/dL (23 Jan 2023 11:57)    I&O's Summary    23 Jan 2023 07:01  -  24 Jan 2023 07:00  --------------------------------------------------------  IN: 420 mL / OUT: 200 mL / NET: 220 mL        VITALS:   T(C): 36.7 (01-24-23 @ 04:49), Max: 37.5 (01-23-23 @ 09:59)  HR: 79 (01-24-23 @ 04:49) (79 - 87)  BP: 100/66 (01-24-23 @ 04:49) (99/66 - 113/70)  RR: 20 (01-24-23 @ 04:49) (20 - 20)  SpO2: 96% (01-24-23 @ 04:49) (96% - 99%)    GENERAL: NAD, lying in bed comfortably  HEAD:  Atraumatic, normocephalic  EYES: EOMI, PERRLA, conjunctiva and sclera clear  ENT: Moist mucous membranes  NECK: Supple, no JVD  HEART: Regular rate and rhythm, no murmurs, rubs, or gallops  LUNGS: Unlabored respirations.  Clear to auscultation bilaterally, no crackles, wheezing, or rhonchi  ABDOMEN: Soft, nontender, nondistended, +BS  EXTREMITIES: 2+ peripheral pulses bilaterally. No clubbing, cyanosis, or edema  NERVOUS SYSTEM:  A&Ox3, no focal deficits   SKIN: No rashes or lesions    LABS:                        11.7   14.55 )-----------( 175      ( 23 Jan 2023 07:26 )             39.7     01-23    134<L>  |  96  |  51<H>  ----------------------------<  125<H>  4.2   |  20<L>  |  3.78<H>    Ca    9.1      23 Jan 2023 07:26  Phos  4.4     01-23  Mg     2.0     01-23    TPro  6.6  /  Alb  2.7<L>  /  TBili  0.4  /  DBili  x   /  AST  21  /  ALT  15  /  AlkPhos  109  01-23              Culture - Blood (collected 22 Jan 2023 10:05)  Source: .Blood Blood-Peripheral  Preliminary Report (23 Jan 2023 16:01):    No growth to date.    Culture - Blood (collected 22 Jan 2023 10:00)  Source: .Blood Blood-Peripheral  Preliminary Report (23 Jan 2023 16:01):    No growth to date.        RADIOLOGY & ADDITIONAL TESTS:  Results Reviewed:   Imaging Personally Reviewed:  Electrocardiogram Personally Reviewed:    COORDINATION OF CARE:  Care Discussed with Consultants/Other Providers [Y/N]:  Prior or Outpatient Records Reviewed [Y/N]:     PROGRESS NOTE:     Patient is a 82y old  Female who presents with a chief complaint of acute cholecystitis, DKA (23 Jan 2023 14:05)      ---------------------------------------------------  Dmitry Dangelo  PGY-1, Internal Medicine  Available on [employer] Teams  Pager: [phone] ([facility]), or [phone] ([facility])  ---------------------------------------------------    SUBJECTIVE / OVERNIGHT EVENTS:    No acute events overnight. Patient examined at bedside, Kiswahili  Taryn (ID# [id number]) provided translation. Patient reports mild abdominal pain, improved from prior. Reports that her throat is dry and is having difficulty speaking as a result.      MEDICATIONS  (STANDING):  atorvastatin 40 milliGRAM(s) Oral at bedtime  chlorhexidine 4% Liquid 1 Application(s) Topical <User Schedule>  dextrose 5%. 1000 milliLiter(s) (100 mL/Hr) IV Continuous <Continuous>  dextrose 5%. 1000 milliLiter(s) (50 mL/Hr) IV Continuous <Continuous>  dextrose 50% Injectable 25 Gram(s) IV Push once  dextrose 50% Injectable 12.5 Gram(s) IV Push once  dextrose 50% Injectable 25 Gram(s) IV Push once  gabapentin 200 milliGRAM(s) Oral three times a day  glucagon  Injectable 1 milliGRAM(s) IntraMuscular once  heparin   Injectable 5000 Unit(s) SubCutaneous every 8 hours  indomethacin Suppository 100 milliGRAM(s) Rectal once  insulin glargine Injectable (LANTUS) 10 Unit(s) SubCutaneous at bedtime  insulin lispro (ADMELOG) corrective regimen sliding scale   SubCutaneous every 6 hours  metoprolol succinate  milliGRAM(s) Oral daily  piperacillin/tazobactam IVPB.. 3.375 Gram(s) IV Intermittent every 8 hours  sodium chloride 0.9%. 300 milliLiter(s) (50 mL/Hr) IV Continuous <Continuous>    MEDICATIONS  (PRN):  acetaminophen     Tablet .. 650 milliGRAM(s) Oral every 6 hours PRN Temp greater or equal to 38C (100.4F), Mild Pain (1 - 3), Moderate Pain (4 - 6)  albuterol/ipratropium for Nebulization 3 milliLiter(s) Nebulizer every 6 hours PRN Shortness of Breath and/or Wheezing  aluminum hydroxide/magnesium hydroxide/simethicone Suspension 30 milliLiter(s) Oral every 6 hours PRN Dyspepsia  dextrose Oral Gel 15 Gram(s) Oral once PRN Blood Glucose LESS THAN 70 milliGRAM(s)/deciliter  morphine  - Injectable 1 milliGRAM(s) IV Push every 6 hours PRN Severe Pain (7 - 10)  sodium chloride 0.9% lock flush 10 milliLiter(s) IV Push every 1 hour PRN Pre/post blood products, medications, blood draw, and to maintain line patency      CAPILLARY BLOOD GLUCOSE      POCT Blood Glucose.: 111 mg/dL (23 Jan 2023 21:15)  POCT Blood Glucose.: 120 mg/dL (23 Jan 2023 18:20)  POCT Blood Glucose.: 130 mg/dL (23 Jan 2023 11:57)    I&O's Summary    23 Jan 2023 07:01  -  24 Jan 2023 07:00  --------------------------------------------------------  IN: 420 mL / OUT: 200 mL / NET: 220 mL        VITALS:   T(C): 36.7 (01-24-23 @ 04:49), Max: 37.5 (01-23-23 @ 09:59)  HR: 79 (01-24-23 @ 04:49) (79 - 87)  BP: 100/66 (01-24-23 @ 04:49) (99/66 - 113/70)  RR: 20 (01-24-23 @ 04:49) (20 - 20)  SpO2: 96% (01-24-23 @ 04:49) (96% - 99%)    GENERAL: Older woman in NAD, lying in bed comfortably  ENT: Moist mucous membranes  NECK: Supple   CHEST/LUNG: End expiratory wheezing appreciated bilaterally, faint inspiratory crackles  HEART: Regular rate and rhythm; No murmurs, rubs, or gallops  ABDOMEN: Generalized diffuse mild tenderness to palpation, bowel sounds present; Soft, Nontender, Nondistended. No hepatomegaly  EXTREMITIES: +bilateral UE lymphedema R > L, 1+ bilateral LE pitting edema, 2+ peripheral pulses bilaterally  NERVOUS SYSTEM:  Alert & Oriented X3, speech clear. No deficits   MSK: FROM all 4 extremities, full and equal strength  SKIN: No rashes or lesions      LABS:                          10.0   14.82 )-----------( 209      ( 24 Jan 2023 09:22 )             32.5     01-24    136  |  100  |  50<H>  ----------------------------<  117<H>  4.1   |  22  |  3.80<H>    Ca    8.6      24 Jan 2023 09:22  Phos  4.3     01-24  Mg     2.0     01-24    TPro  6.0  /  Alb  2.5<L>  /  TBili  0.3  /  DBili  x   /  AST  16  /  ALT  11  /  AlkPhos  118  01-24        Culture - Blood (collected 22 Jan 2023 10:05)  Source: .Blood Blood-Peripheral  Preliminary Report (23 Jan 2023 16:01):    No growth to date.    Culture - Blood (collected 22 Jan 2023 10:00)  Source: .Blood Blood-Peripheral  Preliminary Report (23 Jan 2023 16:01):    No growth to date.      RADIOLOGY & ADDITIONAL TESTS:    < from: MR Abdomen No Cont (01.23.23 @ 18:13) >  IMPRESSION:  1.  Acute cholecystitis.  2.  A 5.5 cm multiloculated fluid collection adjacent to the gallbladder   neck could be due to gallbladder perforation, limited in evaluation   without IV contrast.    < end of copied text >

## 2023-01-24 NOTE — PHARMACOTHERAPY INTERVENTION NOTE - COMMENTS
Recommended to adjust piperacillin/tazobactam dose from 3.375 g IV q8h to 3.375 g IV q12h since the eGFR is 11 mL/min/1.73 m2.    Jus Cronin, PharmD, [facility]  Clinical Pharmacy Specialist, Infectious Diseases  Tele-Antimicrobial Stewardship Program (Tele-ASP)  Tele-ASP Phone: [phone]

## 2023-01-24 NOTE — PHARMACOTHERAPY INTERVENTION NOTE - COMMENTS
Modified penicillin allergy history to state that patient tolerated piperacillin/tazobactam during this admission.    Jus Cronin, PharmD, BCIDP  Clinical Pharmacy Specialist, Infectious Diseases  Tele-Antimicrobial Stewardship Program (Tele-ASP)  Tele-ASP Phone: [phone]

## 2023-01-24 NOTE — PROGRESS NOTE ADULT - PROBLEM SELECTOR PLAN 6
Diet: NPO  DVT PPx: heparin subq   Dispo: pending clinical improvement Diet: NPO  DVT PPx: heparin subq (held in case of IR procedure)  Dispo: pending clinical improvement

## 2023-01-24 NOTE — PROGRESS NOTE ADULT - ASSESSMENT
Assessment: 82F with RUQ pain c/s acute cholecystitis and choledocholithiasis with significant co-morbidities including CAD, s/p CABG, CHF, poorly controlled DM, s/p ERCP (1/20). Doing ok, still poor operative candidate. Imaging reviewed and unable to define source of new collection vs inflammatory changes.     Plan:  - Patient remains a poor surgical candidate  - f/u official MRI read   - IR for possible drainage  - continue IVF + IV abx  - continue supportive care  - pain control PRN    [facility]  [id number]

## 2023-01-24 NOTE — PROGRESS NOTE ADULT - SUBJECTIVE AND OBJECTIVE BOX
SUBJECTIVE:   Seen and examined.     OBJECTIVE: T(C): 36.7 (01-24-23 @ 04:49), Max: 37.5 (01-23-23 @ 09:59)  HR: 79 (01-24-23 @ 04:49) (79 - 87)  BP: 100/66 (01-24-23 @ 04:49) (99/66 - 113/70)  RR: 20 (01-24-23 @ 04:49) (20 - 20)  SpO2: 96% (01-24-23 @ 04:49) (96% - 99%)  Wt(kg): --  I&O's Summary    23 Jan 2023 07:01  -  24 Jan 2023 07:00  --------------------------------------------------------  IN: 640 mL / OUT: 400 mL / NET: 240 mL      I&O's Detail    23 Jan 2023 07:01  -  24 Jan 2023 07:00  --------------------------------------------------------  IN:    IV PiggyBack: 100 mL    Oral Fluid: 240 mL    sodium chloride 0.9%: 300 mL  Total IN: 640 mL    OUT:    Voided (mL): 400 mL  Total OUT: 400 mL    Total NET: 240 mL      Physical Exam  General: NAD  Resp: nonlabored   Abdomen: soft, mildly tender to RUQ, nondistended  Vasc: WWP    MEDICATIONS  (STANDING):  atorvastatin 40 milliGRAM(s) Oral at bedtime  chlorhexidine 4% Liquid 1 Application(s) Topical <User Schedule>  dextrose 5%. 1000 milliLiter(s) (100 mL/Hr) IV Continuous <Continuous>  dextrose 5%. 1000 milliLiter(s) (50 mL/Hr) IV Continuous <Continuous>  dextrose 50% Injectable 25 Gram(s) IV Push once  dextrose 50% Injectable 12.5 Gram(s) IV Push once  dextrose 50% Injectable 25 Gram(s) IV Push once  gabapentin 200 milliGRAM(s) Oral three times a day  glucagon  Injectable 1 milliGRAM(s) IntraMuscular once  heparin   Injectable 5000 Unit(s) SubCutaneous every 8 hours  indomethacin Suppository 100 milliGRAM(s) Rectal once  insulin glargine Injectable (LANTUS) 10 Unit(s) SubCutaneous at bedtime  insulin lispro (ADMELOG) corrective regimen sliding scale   SubCutaneous every 6 hours  metoprolol succinate  milliGRAM(s) Oral daily  piperacillin/tazobactam IVPB.. 3.375 Gram(s) IV Intermittent every 8 hours  sodium chloride 0.9%. 300 milliLiter(s) (50 mL/Hr) IV Continuous <Continuous>    MEDICATIONS  (PRN):  acetaminophen     Tablet .. 650 milliGRAM(s) Oral every 6 hours PRN Temp greater or equal to 38C (100.4F), Mild Pain (1 - 3), Moderate Pain (4 - 6)  albuterol/ipratropium for Nebulization 3 milliLiter(s) Nebulizer every 6 hours PRN Shortness of Breath and/or Wheezing  aluminum hydroxide/magnesium hydroxide/simethicone Suspension 30 milliLiter(s) Oral every 6 hours PRN Dyspepsia  dextrose Oral Gel 15 Gram(s) Oral once PRN Blood Glucose LESS THAN 70 milliGRAM(s)/deciliter  morphine  - Injectable 1 milliGRAM(s) IV Push every 6 hours PRN Severe Pain (7 - 10)  sodium chloride 0.9% lock flush 10 milliLiter(s) IV Push every 1 hour PRN Pre/post blood products, medications, blood draw, and to maintain line patency      LABS:                        11.7   14.55 )-----------( 175      ( 23 Jan 2023 07:26 )             39.7     01-23    134<L>  |  96  |  51<H>  ----------------------------<  125<H>  4.2   |  20<L>  |  3.78<H>    Ca    9.1      23 Jan 2023 07:26  Phos  4.4     01-23  Mg     2.0     01-23    TPro  6.6  /  Alb  2.7<L>  /  TBili  0.4  /  DBili  x   /  AST  21  /  ALT  15  /  AlkPhos  109  01-23

## 2023-01-24 NOTE — PROGRESS NOTE ADULT - SUBJECTIVE AND OBJECTIVE BOX
DATE OF SERVICE: 01-24-23 @ 16:56    Patient is a 82y old  Female who presents with a chief complaint of acute cholecystitis, DKA (24 Jan 2023 14:23)      INTERVAL HISTORY: Feels ok.     REVIEW OF SYSTEMS:  CONSTITUTIONAL: No weakness  EYES/ENT: No visual changes;  No throat pain   NECK: No pain or stiffness  RESPIRATORY: No cough, wheezing; No shortness of breath  CARDIOVASCULAR: No chest pain or palpitations  GASTROINTESTINAL: No abdominal  pain. No nausea, vomiting, or hematemesis  GENITOURINARY: No dysuria, frequency or hematuria  NEUROLOGICAL: No stroke like symptoms  SKIN: No rashes  	  MEDICATIONS:  metoprolol succinate  milliGRAM(s) Oral daily        PHYSICAL EXAM:  T(C): 37.2 (01-24-23 @ 16:30), Max: 37.2 (01-24-23 @ 16:30)  HR: 83 (01-24-23 @ 16:30) (79 - 87)  BP: 120/74 (01-24-23 @ 16:30) (100/66 - 120/74)  RR: 20 (01-24-23 @ 16:30) (20 - 20)  SpO2: 94% (01-24-23 @ 16:30) (94% - 98%)  Wt(kg): --  I&O's Summary    23 Jan 2023 07:01  -  24 Jan 2023 07:00  --------------------------------------------------------  IN: 640 mL / OUT: 400 mL / NET: 240 mL    24 Jan 2023 07:01  -  24 Jan 2023 16:56  --------------------------------------------------------  IN: 340 mL / OUT: 200 mL / NET: 140 mL          Appearance: In no distress	  HEENT:    PERRL, EOMI	  Cardiovascular:  S1 S2, No JVD  Respiratory: Lungs clear to auscultation	  Gastrointestinal:  Soft, Non-tender, + BS	  Vascularature:  No edema of LE  Psychiatric: Appropriate affect   Neuro: no acute focal deficits                               10.0   14.82 )-----------( 209      ( 24 Jan 2023 09:22 )             32.5     01-24    136  |  100  |  50<H>  ----------------------------<  117<H>  4.1   |  22  |  3.80<H>    Ca    8.6      24 Jan 2023 09:22  Phos  4.3     01-24  Mg     2.0     01-24    TPro  6.0  /  Alb  2.5<L>  /  TBili  0.3  /  DBili  x   /  AST  16  /  ALT  11  /  AlkPhos  118  01-24        Labs personally reviewed      ASSESSMENT/PLAN: 	    80 year old woman with CAD s/p CABG in 2020, prior DVT on Eliquis, HTN, and compensated diastolic CHF presents with acute cholecystitis.       #Post-operative risk evaluation-  S/p ERCP with CBD stent. Tolerate procedure well.   Ms. Batista displays no signs or symptoms of acute coronary ischemia or decompensated CHF.  Admission EKG is sinus with pre-existing anteroseptal q-waves.  Recent echo noted- normal LV systolic function with no hemodynamically significant valvular disease.  IR reconsulted for possible drainage.   Pt may proceed from a cardiac standpoint.     #Compensated diastolic CHF-  Continue PO Diuretics    #CAD s/p CABG-  No signs of active ischemic.  ASA on hold  Statin as ordered.    #Sepsis   - 2/2 acute cholecystitis   - S/p CBD stent  - Plan for IR drain 1/25          Delaney Vasquez, FABIOLA-NP   Sergey Winchester DO [facility]  Cardiovascular Medicine  [address] [place] [address], [address]  Available through call or text on [employer]  Office: [phone]

## 2023-01-24 NOTE — PROGRESS NOTE ADULT - ASSESSMENT
no distress on NC   afebrile  stated breathing ok      acetaminophen     Tablet .. 650 milliGRAM(s) Oral every 6 hours PRN  aluminum hydroxide/magnesium hydroxide/simethicone Suspension 30 milliLiter(s) Oral every 6 hours PRN  aspirin  chewable 81 milliGRAM(s) Oral daily  atorvastatin 40 milliGRAM(s) Oral at bedtime  chlorhexidine 4% Liquid 1 Application(s) Topical <User Schedule>  dextrose 5%. 1000 milliLiter(s) IV Continuous <Continuous>  dextrose 5%. 1000 milliLiter(s) IV Continuous <Continuous>  dextrose 50% Injectable 25 Gram(s) IV Push once  dextrose 50% Injectable 12.5 Gram(s) IV Push once  dextrose 50% Injectable 25 Gram(s) IV Push once  dextrose Oral Gel 15 Gram(s) Oral once PRN  gabapentin 200 milliGRAM(s) Oral three times a day  glucagon  Injectable 1 milliGRAM(s) IntraMuscular once  heparin   Injectable 5000 Unit(s) SubCutaneous every 8 hours  indomethacin Suppository 100 milliGRAM(s) Rectal once  insulin glargine Injectable (LANTUS) 10 Unit(s) SubCutaneous at bedtime  insulin lispro (ADMELOG) corrective regimen sliding scale   SubCutaneous Before meals and at bedtime  metoprolol succinate  milliGRAM(s) Oral daily  morphine  - Injectable 1 milliGRAM(s) IV Push every 6 hours PRN  piperacillin/tazobactam IVPB.. 3.375 Gram(s) IV Intermittent every 8 hours  sodium chloride 0.9% lock flush 10 milliLiter(s) IV Push every 1 hour PRN  spironolactone 12.5 milliGRAM(s) Oral daily      VITAL:  T(C): , Max: 37.3 (01-21-23 @ 13:21)  T(F): , Max: 99.1 (01-21-23 @ 13:21)  HR: 88 (01-21-23 @ 13:21)  BP: 100/60 (01-21-23 @ 13:21)  BP(mean): --  RR: 18 (01-21-23 @ 13:21)  SpO2: 96% (01-21-23 @ 13:21)  Wt(kg): --    01-20-23 @ 07:01  -  01-21-23 @ 07:00  --------------------------------------------------------  IN: 440 mL / OUT: 50 mL / NET: 390 mL    01-21-23 @ 07:01  -  01-21-23 @ 14:41  --------------------------------------------------------  IN: 850 mL / OUT: 250 mL / NET: 600 mL        PHYSICAL EXAM:  General: NAD; Alert  HEENT:  NCAT; PERRLA  Neck: No JVD; supple  Respiratory: CTA-b/l  Cardiac: RRR s1s2  Gastrointestinal: BS+, soft, NT/ND  Urologic: No chen  Extremities: No peripheral edema  Access:     LABS:                          11.5   13.38 )-----------( 188      ( 21 Jan 2023 06:59 )             37.6     Na(137)/K(4.0)/Cl(100)/HCO3(26)/BUN(43)/Cr(2.67)Glu(151)/Ca(8.7)/Mg(1.9)/PO4(3.8)    01-21 @ 06:59  Na(138)/K(4.1)/Cl(101)/HCO3(26)/BUN(38)/Cr(2.44)Glu(145)/Ca(9.0)/Mg(1.9)/PO4(3.5)    01-20 @ 06:35  Na(139)/K(4.1)/Cl(100)/HCO3(26)/BUN(26)/Cr(1.62)Glu(203)/Ca(9.4)/Mg(--)/PO4(--)    01-19 @ 06:58  Na(136)/K(4.2)/Cl(98)/HCO3(24)/BUN(31)/Cr(1.58)Glu(323)/Ca(9.4)/Mg(--)/PO4(--)    01-18 @ 16:14      Sodium, Random Urine: 27 mmol/L (01-21 @ 06:50)  Osmolality, Random Urine: 368 mos/kg (01-21 @ 06:50)  Potassium, Random Urine: 53 mmol/L (01-21 @ 06:50)    IMAGES:  CONTRAST/COMPLICATIONS:  IV Contrast: NONE  Oral Contrast: NONE  Complications: None reported at time of study completion    PROCEDURE:  CT of the Abdomen and Pelvis was performed.  Sagittal and coronal reformats were performed.    FINDINGS:  LOWER CHEST: Small bilateral pleural effusions. Cardiomegaly and coronary   artery calcifications. Partially imaged sternotomy.    LIVER: Within normal limits.  BILE DUCTS: Biliary stent in place. No intrahepatic biliary ductal   dilatation. Persistent CBD stones adjacent to the stent.  GALLBLADDER: Cholelithiasis. Ill-defined collection in the region of the   gallbladder neck and common bile duct measuring approximately 4.6 x 3.0   cm also encompassing the secondportion of the duodenum.  SPLEEN: Within normal limits.  PANCREAS: Fatty atrophy.  ADRENALS: Within normal limits.  KIDNEYS/URETERS: Unchanged chronically hydronephrotic right kidney with   right renal atrophy.    BLADDER: Within normal limits.  REPRODUCTIVE ORGANS: Myomatous uterus.    BOWEL: Rectosigmoid and small bowel anastomoses. No bowel obstruction.  PERITONEUM: No ascites.  VESSELS: Atherosclerotic changes.  RETROPERITONEUM/LYMPH NODES: No lymphadenopathy.  ABDOMINAL WALL: Redemonstrated small to moderate fat-containing ventral   hernia.  BONES: Right total hip arthroplasty. Degenerative changes.    IMPRESSION:  New ill-defined collection in the region of the gallbladder neck and   common bile duct also encompassing the second portionof the duodenum.   Findings may be related to bile leak, perforated cholecystitis, or   duodenal hematoma. Recommend further evaluation with MRI.    Interval placement of a stent within the common bile duct with a residual   CBD stones adjacent to the stent.    --- End of Report ---            ASSESSMENT/PLAN  Patient is a 80 year old F with a PMHx of HTN, HLD, T2DM, HFpEF (EF 65% X/2022), breast cancer s/p R partial mastectomy, rectal carcinoma s/p resection, CAD s/p CABG (11/2020) who presents for 1 day of progressively worsening abdominal pain associated with nausea and NBNB vomiting. 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  low nephron mass   non oliguric harleen likely in the view of relative  hypotension   CVS----BP  soft   GI:  acute cholecystitis --- ERCP with stent placement  1/20/23  biliary leak ---IR and surgical evaluation     RECOMMENDATION:  1)Renal: worsening cr but stable for today  avoid nephrotoxic medication  monitor off diuretics.  daily bmp  2)CVS: euvolemic ,BP soft ,  metoprolol  with holding parameters   3)ID:on IV abx  4) GI ---  on board sp ERCP >>biliary leak ,not a surgical candidate likely woody Rader  [facility]  Office: [phone]   no distress on NC   afebrile  breathing ok     acetaminophen     Tablet .. 650 milliGRAM(s) Oral every 6 hours PRN  aluminum hydroxide/magnesium hydroxide/simethicone Suspension 30 milliLiter(s) Oral every 6 hours PRN  aspirin  chewable 81 milliGRAM(s) Oral daily  atorvastatin 40 milliGRAM(s) Oral at bedtime  chlorhexidine 4% Liquid 1 Application(s) Topical <User Schedule>  dextrose 5%. 1000 milliLiter(s) IV Continuous <Continuous>  dextrose 5%. 1000 milliLiter(s) IV Continuous <Continuous>  dextrose 50% Injectable 25 Gram(s) IV Push once  dextrose 50% Injectable 12.5 Gram(s) IV Push once  dextrose 50% Injectable 25 Gram(s) IV Push once  dextrose Oral Gel 15 Gram(s) Oral once PRN  gabapentin 200 milliGRAM(s) Oral three times a day  glucagon  Injectable 1 milliGRAM(s) IntraMuscular once  heparin   Injectable 5000 Unit(s) SubCutaneous every 8 hours  indomethacin Suppository 100 milliGRAM(s) Rectal once  insulin glargine Injectable (LANTUS) 10 Unit(s) SubCutaneous at bedtime  insulin lispro (ADMELOG) corrective regimen sliding scale   SubCutaneous Before meals and at bedtime  metoprolol succinate  milliGRAM(s) Oral daily  morphine  - Injectable 1 milliGRAM(s) IV Push every 6 hours PRN  piperacillin/tazobactam IVPB.. 3.375 Gram(s) IV Intermittent every 8 hours  sodium chloride 0.9% lock flush 10 milliLiter(s) IV Push every 1 hour PRN  spironolactone 12.5 milliGRAM(s) Oral daily      VITAL:  T(C): , Max: 37.3 (01-21-23 @ 13:21)  T(F): , Max: 99.1 (01-21-23 @ 13:21)  HR: 88 (01-21-23 @ 13:21)  BP: 100/60 (01-21-23 @ 13:21)  BP(mean): --  RR: 18 (01-21-23 @ 13:21)  SpO2: 96% (01-21-23 @ 13:21)  Wt(kg): --    01-20-23 @ 07:01  -  01-21-23 @ 07:00  --------------------------------------------------------  IN: 440 mL / OUT: 50 mL / NET: 390 mL    01-21-23 @ 07:01  -  01-21-23 @ 14:41  --------------------------------------------------------  IN: 850 mL / OUT: 250 mL / NET: 600 mL        PHYSICAL EXAM:  General: NAD; Alert  HEENT:  NCAT; PERRLA  Neck: No JVD; supple  Respiratory: CTA-b/l  Cardiac: RRR s1s2  Gastrointestinal: BS+, soft, NT/ND  Urologic: No chen  Extremities: No peripheral edema  Access:     LABS:                          11.5   13.38 )-----------( 188      ( 21 Jan 2023 06:59 )             37.6     Na(137)/K(4.0)/Cl(100)/HCO3(26)/BUN(43)/Cr(2.67)Glu(151)/Ca(8.7)/Mg(1.9)/PO4(3.8)    01-21 @ 06:59  Na(138)/K(4.1)/Cl(101)/HCO3(26)/BUN(38)/Cr(2.44)Glu(145)/Ca(9.0)/Mg(1.9)/PO4(3.5)    01-20 @ 06:35  Na(139)/K(4.1)/Cl(100)/HCO3(26)/BUN(26)/Cr(1.62)Glu(203)/Ca(9.4)/Mg(--)/PO4(--)    01-19 @ 06:58  Na(136)/K(4.2)/Cl(98)/HCO3(24)/BUN(31)/Cr(1.58)Glu(323)/Ca(9.4)/Mg(--)/PO4(--)    01-18 @ 16:14      Sodium, Random Urine: 27 mmol/L (01-21 @ 06:50)  Osmolality, Random Urine: 368 mos/kg (01-21 @ 06:50)  Potassium, Random Urine: 53 mmol/L (01-21 @ 06:50)    IMAGES:  CONTRAST/COMPLICATIONS:  IV Contrast: NONE  Oral Contrast: NONE  Complications: None reported at time of study completion    PROCEDURE:  CT of the Abdomen and Pelvis was performed.  Sagittal and coronal reformats were performed.    FINDINGS:  LOWER CHEST: Small bilateral pleural effusions. Cardiomegaly and coronary   artery calcifications. Partially imaged sternotomy.    LIVER: Within normal limits.  BILE DUCTS: Biliary stent in place. No intrahepatic biliary ductal   dilatation. Persistent CBD stones adjacent to the stent.  GALLBLADDER: Cholelithiasis. Ill-defined collection in the region of the   gallbladder neck and common bile duct measuring approximately 4.6 x 3.0   cm also encompassing the secondportion of the duodenum.  SPLEEN: Within normal limits.  PANCREAS: Fatty atrophy.  ADRENALS: Within normal limits.  KIDNEYS/URETERS: Unchanged chronically hydronephrotic right kidney with   right renal atrophy.    BLADDER: Within normal limits.  REPRODUCTIVE ORGANS: Myomatous uterus.    BOWEL: Rectosigmoid and small bowel anastomoses. No bowel obstruction.  PERITONEUM: No ascites.  VESSELS: Atherosclerotic changes.  RETROPERITONEUM/LYMPH NODES: No lymphadenopathy.  ABDOMINAL WALL: Redemonstrated small to moderate fat-containing ventral   hernia.  BONES: Right total hip arthroplasty. Degenerative changes.    IMPRESSION:  New ill-defined collection in the region of the gallbladder neck and   common bile duct also encompassing the second portionof the duodenum.   Findings may be related to bile leak, perforated cholecystitis, or   duodenal hematoma. Recommend further evaluation with MRI.    Interval placement of a stent within the common bile duct with a residual   CBD stones adjacent to the stent.    --- End of Report ---            ASSESSMENT/PLAN  Patient is a 80 year old F with a PMHx of HTN, HLD, T2DM, HFpEF (EF 65% X/2022), breast cancer s/p R partial mastectomy, rectal carcinoma s/p resection, CAD s/p CABG (11/2020) who presents for 1 day of progressively worsening abdominal pain associated with nausea and NBNB vomiting. 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  low nephron mass   non oliguric harleen likely in the view of relative  hypotension   CVS----BP  soft   GI:  acute cholecystitis --- ERCP with stent placement  1/20/23  biliary leak ---IR and surgical evaluation     RECOMMENDATION:  1)Renal: worsening cr but stable for today  avoid nephrotoxic medication  monitor off diuretics.  daily bmp  2)CVS: euvolemic ,BP soft ,  metoprolol  with holding parameters   3)ID:on IV abx  4) GI ---  on board sp ERCP >>biliary leak ,not a surgical candidate [facility]  [facility]  Office: [phone]

## 2023-01-24 NOTE — PROGRESS NOTE ADULT - ASSESSMENT
Patient is a 82 year old F with a PMHx of HTN, HLD, T2DM, HFpEF (EF 65% X/2022), breast cancer s/p R partial mastectomy, rectal carcinoma s/p resection, CAD s/p CABG who presents for 1 week of intermittent abdominal pain associated with nausea and vomiting, found to have acute cholecystitis with choledocholithiasis as well as mild DKA Patient is a 82 year old F with a PMHx of HTN, HLD, T2DM, HFpEF (EF 65% X/2022), breast cancer s/p R partial mastectomy, rectal carcinoma s/p resection, CAD s/p CABG who presents for 1 week of intermittent abdominal pain associated with nausea and vomiting, found to have acute cholecystitis with choledocholithiasis. Patient is now s/p ERCP with CBD placement but found to have new fluid collection adjacent to the gallbladder neck, pending IR consult for possible percutaneous cholecystostomy.

## 2023-01-24 NOTE — PROGRESS NOTE ADULT - ATTENDING COMMENTS
DATE OF SERVICE: 01-24-23 @ 11:52    Seen and examined.  MRI reviewed, shows distended GB, 5.5cm collection at neck though limited by lack of contrast    WBC 14.8  Abdomen soft, minimal RUQ TTP today    Continue abx  Recommend IR for drainage of collection and perc rissa given findings on MRI  patient is a poor operative candidate at this moment and remains HD stable but requires drainage of the gallbladder and collection

## 2023-01-24 NOTE — PROGRESS NOTE ADULT - ATTENDING COMMENTS
82F with h/o HTN, T2DM, HFpEF (EF 65%, normal LV in 2022), breast cancer s/p R partial mastectomy, rectal carcinoma s/p resection, CAD s/p CABG who presents for 1 week of intermittent abdominal pain associated with nausea and vomiting.    1. Acute cholecystitis with choledocholithiasis   2. Uncontrolled T2DM with hyperglycemia  3. TAO on CKD 3, pre-renal  4. Chronic hydronephrosis  5. HTN  6. h/o CABG    No abd pain, N/V.     - s/p ERCP by GI on 1/20:  large CBD stones on fluoro and relatively small sized ampulla; +small periampullary diverticula; s/p sphincterotomy + plastic CBD stent placement with successful drainage of bile/sludge. patient will need  repeat ERCP in 2-3 months.  - Abd pain after procedure w/ fever - CT suggestive of fluid collection and MRI with ultiloculated fluid collection adjacent to the gallbladder   - IR reviewed - plan for perc rissa tomorrow. NPO MN  - Cr stable today after IVF, monitor BMP     d/w daughter at bedside.     Rest as above.

## 2023-01-24 NOTE — PROGRESS NOTE ADULT - PROBLEM SELECTOR PLAN 1
Patient presenting with 1 week of abdominal pain, nausea, and vomiting  - Initial CT A/P: acute cholecystitis with 1.2 cm common bile duct dilatation  - per surgery, not a surgical candidate given comorbidities   - s/p ERCP 1/20 : choledocholithiasis w/ large stone w/ sphincterotomy and biliary stent placement.   - patient now with recurrent abdominal pain, and leukocytosis, repeat CT A/P non con concerning for fluid collection around the neck of the gallbladder, possible perf  - IR recommending repeat imaging with MRI prior to intervention given possible stable inflammatory changes on imaging    Plan:  > f/u MR abdomen with MRCP, if concern for perf will reconsult IR for drain placement  > continue with zosyn  > pain/nausea control with tylenol and maalox  > lipase WNL  > AVOID zofran given QTc 518  > trend CMP, coags, otherwise stable  > Patient will need repeat ERCP in 2-3 months for stent removal Patient presenting with 1 week of abdominal pain, nausea, and vomiting  - Initial CT A/P: acute cholecystitis with 1.2 cm common bile duct dilatation  - per surgery, not a surgical candidate given comorbidities   - s/p ERCP 1/20 : choledocholithiasis w/ large stone w/ sphincterotomy and biliary stent placement.   - patient now with recurrent abdominal pain, and leukocytosis, repeat CT A/P and MRI non con concerning for multiloculated fluid collection around the neck of the gallbladder, possible perf    Plan:  > f/u IR reconsult for perc rissa given multiloculated fluid collection on MRI abdomen w MRCP  > continue with zosyn  > pain/nausea control with tylenol and maalox  > lipase WNL; minimal c/f post ERCP pancreatitis  > AVOID zofran given QTc 518  > trend CMP, coags, otherwise stable  > Patient will need repeat ERCP in 2-3 months for stent removal

## 2023-01-24 NOTE — PROGRESS NOTE ADULT - PROBLEM SELECTOR PLAN 2
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   - Cr now worsening, 3.78  - s/p bumex 2mg IV 1/21 for dyspnea likely iso fluid overload due to HFpEF and holding of home diuretic  - non oliguric harleen, likely multifactorial etiology iso of dec po intake, intrinsic component  - bladder scan PVRs normal, patient is not retaining  - renal US revealing chronic R hydronephrosis, stable from prior  - nephro recs appreciated, NS 50 cc/hr x 6h 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   - Cr now worsening, 3.80  - s/p bumex 2mg IV 1/21 for dyspnea likely iso fluid overload due to HFpEF and holding of home diuretic  - non oliguric harleen, likely multifactorial etiology iso of dec po intake, intrinsic component  - bladder scan PVRs normal, patient is not retaining  - renal US revealing chronic R hydronephrosis, stable from prior  - nephro recs appreciated, fluid challenge 1/23, Cr stable no improvement

## 2023-01-25 ENCOUNTER — APPOINTMENT (OUTPATIENT)
Dept: CARDIOLOGY | Facility: CLINIC | Age: 83
End: 2023-01-25

## 2023-01-25 ENCOUNTER — APPOINTMENT (OUTPATIENT)
Dept: INTERNAL MEDICINE | Facility: CLINIC | Age: 83
End: 2023-01-25

## 2023-01-25 LAB
ALBUMIN SERPL ELPH-MCNC: 2.5 G/DL — LOW (ref 3.3–5)
ALP SERPL-CCNC: 118 U/L — SIGNIFICANT CHANGE UP (ref 40–120)
ALT FLD-CCNC: 12 U/L — SIGNIFICANT CHANGE UP (ref 10–45)
ANION GAP SERPL CALC-SCNC: 17 MMOL/L — SIGNIFICANT CHANGE UP (ref 5–17)
APPEARANCE UR: ABNORMAL
APTT BLD: 29.9 SEC — SIGNIFICANT CHANGE UP (ref 27.5–35.5)
APTT BLD: 30.3 SEC — SIGNIFICANT CHANGE UP (ref 27.5–35.5)
AST SERPL-CCNC: 13 U/L — SIGNIFICANT CHANGE UP (ref 10–40)
BACTERIA # UR AUTO: NEGATIVE — SIGNIFICANT CHANGE UP
BILIRUB SERPL-MCNC: 0.3 MG/DL — SIGNIFICANT CHANGE UP (ref 0.2–1.2)
BILIRUB UR-MCNC: NEGATIVE — SIGNIFICANT CHANGE UP
BLD GP AB SCN SERPL QL: NEGATIVE — SIGNIFICANT CHANGE UP
BUN SERPL-MCNC: 51 MG/DL — HIGH (ref 7–23)
CALCIUM SERPL-MCNC: 9.1 MG/DL — SIGNIFICANT CHANGE UP (ref 8.4–10.5)
CHLORIDE SERPL-SCNC: 98 MMOL/L — SIGNIFICANT CHANGE UP (ref 96–108)
CO2 SERPL-SCNC: 21 MMOL/L — LOW (ref 22–31)
COLOR SPEC: YELLOW — SIGNIFICANT CHANGE UP
CREAT ?TM UR-MCNC: 67 MG/DL — SIGNIFICANT CHANGE UP
CREAT SERPL-MCNC: 4.09 MG/DL — HIGH (ref 0.5–1.3)
DIFF PNL FLD: ABNORMAL
EGFR: 10 ML/MIN/1.73M2 — LOW
EPI CELLS # UR: 3 /HPF — SIGNIFICANT CHANGE UP
GLUCOSE BLDC GLUCOMTR-MCNC: 110 MG/DL — HIGH (ref 70–99)
GLUCOSE BLDC GLUCOMTR-MCNC: 114 MG/DL — HIGH (ref 70–99)
GLUCOSE BLDC GLUCOMTR-MCNC: 180 MG/DL — HIGH (ref 70–99)
GLUCOSE BLDC GLUCOMTR-MCNC: 188 MG/DL — HIGH (ref 70–99)
GLUCOSE SERPL-MCNC: 112 MG/DL — HIGH (ref 70–99)
GLUCOSE UR QL: NEGATIVE — SIGNIFICANT CHANGE UP
GRAM STN FLD: SIGNIFICANT CHANGE UP
HCT VFR BLD CALC: 32.8 % — LOW (ref 34.5–45)
HGB BLD-MCNC: 10.1 G/DL — LOW (ref 11.5–15.5)
HYALINE CASTS # UR AUTO: 1 /LPF — SIGNIFICANT CHANGE UP (ref 0–2)
INR BLD: 1.05 RATIO — SIGNIFICANT CHANGE UP (ref 0.88–1.16)
INR BLD: 1.05 RATIO — SIGNIFICANT CHANGE UP (ref 0.88–1.16)
KETONES UR-MCNC: SIGNIFICANT CHANGE UP
LEUKOCYTE ESTERASE UR-ACNC: ABNORMAL
MAGNESIUM SERPL-MCNC: 2.1 MG/DL — SIGNIFICANT CHANGE UP (ref 1.6–2.6)
MCHC RBC-ENTMCNC: 29.4 PG — SIGNIFICANT CHANGE UP (ref 27–34)
MCHC RBC-ENTMCNC: 30.8 GM/DL — LOW (ref 32–36)
MCV RBC AUTO: 95.3 FL — SIGNIFICANT CHANGE UP (ref 80–100)
NITRITE UR-MCNC: NEGATIVE — SIGNIFICANT CHANGE UP
NRBC # BLD: 0 /100 WBCS — SIGNIFICANT CHANGE UP (ref 0–0)
OSMOLALITY UR: 293 MOS/KG — LOW (ref 300–900)
PH UR: 6 — SIGNIFICANT CHANGE UP (ref 5–8)
PHOSPHATE SERPL-MCNC: 4.9 MG/DL — HIGH (ref 2.5–4.5)
PLATELET # BLD AUTO: 226 K/UL — SIGNIFICANT CHANGE UP (ref 150–400)
POTASSIUM SERPL-MCNC: 3.9 MMOL/L — SIGNIFICANT CHANGE UP (ref 3.5–5.3)
POTASSIUM SERPL-SCNC: 3.9 MMOL/L — SIGNIFICANT CHANGE UP (ref 3.5–5.3)
POTASSIUM UR-SCNC: 18 MMOL/L — SIGNIFICANT CHANGE UP
PROT ?TM UR-MCNC: 57 MG/DL — HIGH (ref 0–12)
PROT SERPL-MCNC: 6 G/DL — SIGNIFICANT CHANGE UP (ref 6–8.3)
PROT UR-MCNC: ABNORMAL
PROT/CREAT UR-RTO: 0.9 RATIO — HIGH (ref 0–0.2)
PROTHROM AB SERPL-ACNC: 12.1 SEC — SIGNIFICANT CHANGE UP (ref 10.5–13.4)
PROTHROM AB SERPL-ACNC: 12.1 SEC — SIGNIFICANT CHANGE UP (ref 10.5–13.4)
RBC # BLD: 3.44 M/UL — LOW (ref 3.8–5.2)
RBC # FLD: 16.1 % — HIGH (ref 10.3–14.5)
RBC CASTS # UR COMP ASSIST: 87 /HPF — HIGH (ref 0–4)
RH IG SCN BLD-IMP: POSITIVE — SIGNIFICANT CHANGE UP
SODIUM SERPL-SCNC: 136 MMOL/L — SIGNIFICANT CHANGE UP (ref 135–145)
SODIUM UR-SCNC: 35 MMOL/L — SIGNIFICANT CHANGE UP
SP GR SPEC: 1.02 — SIGNIFICANT CHANGE UP (ref 1.01–1.02)
SPECIMEN SOURCE: SIGNIFICANT CHANGE UP
UROBILINOGEN FLD QL: NEGATIVE — SIGNIFICANT CHANGE UP
UUN UR-MCNC: 415 MG/DL — SIGNIFICANT CHANGE UP
WBC # BLD: 15.01 K/UL — HIGH (ref 3.8–10.5)
WBC # FLD AUTO: 15.01 K/UL — HIGH (ref 3.8–10.5)
WBC UR QL: 13 /HPF — HIGH (ref 0–5)

## 2023-01-25 PROCEDURE — 47490 INCISION OF GALLBLADDER: CPT

## 2023-01-25 PROCEDURE — 99233 SBSQ HOSP IP/OBS HIGH 50: CPT | Mod: GC

## 2023-01-25 RX ORDER — SODIUM CHLORIDE 9 MG/ML
300 INJECTION INTRAMUSCULAR; INTRAVENOUS; SUBCUTANEOUS
Refills: 0 | Status: DISCONTINUED | OUTPATIENT
Start: 2023-01-25 | End: 2023-01-25

## 2023-01-25 RX ORDER — INSULIN LISPRO 100/ML
VIAL (ML) SUBCUTANEOUS
Refills: 0 | Status: DISCONTINUED | OUTPATIENT
Start: 2023-01-25 | End: 2023-02-07

## 2023-01-25 RX ORDER — HEPARIN SODIUM 5000 [USP'U]/ML
5000 INJECTION INTRAVENOUS; SUBCUTANEOUS EVERY 8 HOURS
Refills: 0 | Status: DISCONTINUED | OUTPATIENT
Start: 2023-01-25 | End: 2023-01-28

## 2023-01-25 RX ORDER — SODIUM CHLORIDE 9 MG/ML
1000 INJECTION, SOLUTION INTRAVENOUS
Refills: 0 | Status: DISCONTINUED | OUTPATIENT
Start: 2023-01-25 | End: 2023-01-25

## 2023-01-25 RX ORDER — FENTANYL CITRATE 50 UG/ML
25 INJECTION INTRAVENOUS
Refills: 0 | Status: DISCONTINUED | OUTPATIENT
Start: 2023-01-25 | End: 2023-01-28

## 2023-01-25 RX ORDER — INSULIN LISPRO 100/ML
VIAL (ML) SUBCUTANEOUS EVERY 6 HOURS
Refills: 0 | Status: DISCONTINUED | OUTPATIENT
Start: 2023-01-25 | End: 2023-01-25

## 2023-01-25 RX ORDER — ASPIRIN/CALCIUM CARB/MAGNESIUM 324 MG
81 TABLET ORAL DAILY
Refills: 0 | Status: DISCONTINUED | OUTPATIENT
Start: 2023-01-25 | End: 2023-02-02

## 2023-01-25 RX ORDER — SODIUM CHLORIDE 9 MG/ML
1000 INJECTION, SOLUTION INTRAVENOUS
Refills: 0 | Status: DISCONTINUED | OUTPATIENT
Start: 2023-01-25 | End: 2023-01-26

## 2023-01-25 RX ADMIN — ATORVASTATIN CALCIUM 40 MILLIGRAM(S): 80 TABLET, FILM COATED ORAL at 22:26

## 2023-01-25 RX ADMIN — GABAPENTIN 200 MILLIGRAM(S): 400 CAPSULE ORAL at 16:14

## 2023-01-25 RX ADMIN — HEPARIN SODIUM 5000 UNIT(S): 5000 INJECTION INTRAVENOUS; SUBCUTANEOUS at 22:26

## 2023-01-25 RX ADMIN — GABAPENTIN 200 MILLIGRAM(S): 400 CAPSULE ORAL at 22:26

## 2023-01-25 RX ADMIN — Medication 150 MILLIGRAM(S): at 06:59

## 2023-01-25 RX ADMIN — GABAPENTIN 200 MILLIGRAM(S): 400 CAPSULE ORAL at 06:58

## 2023-01-25 RX ADMIN — SODIUM CHLORIDE 75 MILLILITER(S): 9 INJECTION, SOLUTION INTRAVENOUS at 18:29

## 2023-01-25 RX ADMIN — SODIUM CHLORIDE 50 MILLILITER(S): 9 INJECTION INTRAMUSCULAR; INTRAVENOUS; SUBCUTANEOUS at 11:04

## 2023-01-25 RX ADMIN — Medication 650 MILLIGRAM(S): at 01:06

## 2023-01-25 RX ADMIN — INSULIN GLARGINE 10 UNIT(S): 100 INJECTION, SOLUTION SUBCUTANEOUS at 22:26

## 2023-01-25 RX ADMIN — PIPERACILLIN AND TAZOBACTAM 25 GRAM(S): 4; .5 INJECTION, POWDER, LYOPHILIZED, FOR SOLUTION INTRAVENOUS at 06:58

## 2023-01-25 RX ADMIN — CHLORHEXIDINE GLUCONATE 1 APPLICATION(S): 213 SOLUTION TOPICAL at 07:00

## 2023-01-25 RX ADMIN — Medication 3 MILLILITER(S): at 22:58

## 2023-01-25 RX ADMIN — Medication 2: at 18:58

## 2023-01-25 RX ADMIN — PIPERACILLIN AND TAZOBACTAM 25 GRAM(S): 4; .5 INJECTION, POWDER, LYOPHILIZED, FOR SOLUTION INTRAVENOUS at 18:29

## 2023-01-25 NOTE — PROGRESS NOTE ADULT - SUBJECTIVE AND OBJECTIVE BOX
PROGRESS NOTE:     Patient is a 82y old  Female who presents with a chief complaint of acute cholecystitis, DKA (24 Jan 2023 16:56)      ---------------------------------------------------  Dmitry Dangelo  PGY-1, Internal Medicine  Available on [employer] Teams  Pager: [phone] ([facility]), or [phone] ([facility])  ---------------------------------------------------    SUBJECTIVE / OVERNIGHT EVENTS:    No acute events overnight. Patient examined at bedside with no acute complaints.     Pain:  Bowel Movements:  Urination:  OOB:  PT:    REVIEW OF SYSTEMS:    CONSTITUTIONAL: No weakness, fevers or chills  EYES/ENT: No visual changes;  No vertigo or throat pain   NECK: No pain or stiffness  RESPIRATORY: No cough, wheezing, hemoptysis; No shortness of breath  CARDIOVASCULAR: No chest pain or palpitations  GASTROINTESTINAL: No abdominal or epigastric pain. No nausea, vomiting, or hematemesis; No diarrhea or constipation. No melena or hematochezia.  GENITOURINARY: No dysuria, frequency or hematuria  NEUROLOGICAL: No numbness or weakness  SKIN: No itching, rashes      MEDICATIONS  (STANDING):  atorvastatin 40 milliGRAM(s) Oral at bedtime  chlorhexidine 4% Liquid 1 Application(s) Topical <User Schedule>  dextrose 5%. 1000 milliLiter(s) (100 mL/Hr) IV Continuous <Continuous>  dextrose 5%. 1000 milliLiter(s) (50 mL/Hr) IV Continuous <Continuous>  dextrose 50% Injectable 25 Gram(s) IV Push once  dextrose 50% Injectable 12.5 Gram(s) IV Push once  dextrose 50% Injectable 25 Gram(s) IV Push once  gabapentin 200 milliGRAM(s) Oral three times a day  glucagon  Injectable 1 milliGRAM(s) IntraMuscular once  indomethacin Suppository 100 milliGRAM(s) Rectal once  insulin glargine Injectable (LANTUS) 10 Unit(s) SubCutaneous at bedtime  insulin lispro (ADMELOG) corrective regimen sliding scale   SubCutaneous every 6 hours  metoprolol succinate  milliGRAM(s) Oral daily  piperacillin/tazobactam IVPB.. 3.375 Gram(s) IV Intermittent every 12 hours  sodium chloride 0.9%. 300 milliLiter(s) (50 mL/Hr) IV Continuous <Continuous>    MEDICATIONS  (PRN):  acetaminophen     Tablet .. 650 milliGRAM(s) Oral every 6 hours PRN Temp greater or equal to 38C (100.4F), Mild Pain (1 - 3), Moderate Pain (4 - 6)  albuterol/ipratropium for Nebulization 3 milliLiter(s) Nebulizer every 6 hours PRN Shortness of Breath and/or Wheezing  aluminum hydroxide/magnesium hydroxide/simethicone Suspension 30 milliLiter(s) Oral every 6 hours PRN Dyspepsia  dextrose Oral Gel 15 Gram(s) Oral once PRN Blood Glucose LESS THAN 70 milliGRAM(s)/deciliter  sodium chloride 0.9% lock flush 10 milliLiter(s) IV Push every 1 hour PRN Pre/post blood products, medications, blood draw, and to maintain line patency      CAPILLARY BLOOD GLUCOSE      POCT Blood Glucose.: 148 mg/dL (24 Jan 2023 21:17)  POCT Blood Glucose.: 170 mg/dL (24 Jan 2023 16:25)  POCT Blood Glucose.: 102 mg/dL (24 Jan 2023 12:15)    I&O's Summary    24 Jan 2023 07:01  -  25 Jan 2023 07:00  --------------------------------------------------------  IN: 540 mL / OUT: 800 mL / NET: -260 mL        VITALS:   T(C): 36.6 (01-25-23 @ 05:48), Max: 37.2 (01-24-23 @ 16:30)  HR: 70 (01-25-23 @ 05:48) (69 - 85)  BP: 106/61 (01-25-23 @ 05:48) (93/50 - 120/74)  RR: 18 (01-25-23 @ 05:48) (18 - 20)  SpO2: 97% (01-25-23 @ 05:48) (93% - 97%)    GENERAL: NAD, lying in bed comfortably  HEAD:  Atraumatic, normocephalic  EYES: EOMI, PERRLA, conjunctiva and sclera clear  ENT: Moist mucous membranes  NECK: Supple, no JVD  HEART: Regular rate and rhythm, no murmurs, rubs, or gallops  LUNGS: Unlabored respirations.  Clear to auscultation bilaterally, no crackles, wheezing, or rhonchi  ABDOMEN: Soft, nontender, nondistended, +BS  EXTREMITIES: 2+ peripheral pulses bilaterally. No clubbing, cyanosis, or edema  NERVOUS SYSTEM:  A&Ox3, no focal deficits   SKIN: No rashes or lesions    LABS:                        10.0   14.82 )-----------( 209      ( 24 Jan 2023 09:22 )             32.5     01-24    136  |  100  |  50<H>  ----------------------------<  117<H>  4.1   |  22  |  3.80<H>    Ca    8.6      24 Jan 2023 09:22  Phos  4.3     01-24  Mg     2.0     01-24    TPro  6.0  /  Alb  2.5<L>  /  TBili  0.3  /  DBili  x   /  AST  16  /  ALT  11  /  AlkPhos  118  01-24              Culture - Blood (collected 22 Jan 2023 10:05)  Source: .Blood Blood-Peripheral  Preliminary Report (23 Jan 2023 16:01):    No growth to date.    Culture - Blood (collected 22 Jan 2023 10:00)  Source: .Blood Blood-Peripheral  Preliminary Report (23 Jan 2023 16:01):    No growth to date.        RADIOLOGY & ADDITIONAL TESTS:  Results Reviewed:   Imaging Personally Reviewed:  Electrocardiogram Personally Reviewed:    COORDINATION OF CARE:  Care Discussed with Consultants/Other Providers [Y/N]:  Prior or Outpatient Records Reviewed [Y/N]:     PROGRESS NOTE:     Patient is a 82y old  Female who presents with a chief complaint of acute cholecystitis, DKA (24 Jan 2023 16:56)      ---------------------------------------------------  Dmitry Dangelo  PGY-1, Internal Medicine  Available on [employer] Teams  Pager: [phone] ([facility]), or [phone] ([facility])  ---------------------------------------------------    SUBJECTIVE / OVERNIGHT EVENTS:      No acute events overnight. Patient examined at bedside, complaining that she is tired and hungry. Denies any abdominal pain, nausea, or vomiting. Appears comfortable on 1L NC. Patient planned for IR placement of perc rissa today, discussed with daughter at bedside.       MEDICATIONS  (STANDING):  atorvastatin 40 milliGRAM(s) Oral at bedtime  chlorhexidine 4% Liquid 1 Application(s) Topical <User Schedule>  dextrose 5%. 1000 milliLiter(s) (100 mL/Hr) IV Continuous <Continuous>  dextrose 5%. 1000 milliLiter(s) (50 mL/Hr) IV Continuous <Continuous>  dextrose 50% Injectable 25 Gram(s) IV Push once  dextrose 50% Injectable 12.5 Gram(s) IV Push once  dextrose 50% Injectable 25 Gram(s) IV Push once  gabapentin 200 milliGRAM(s) Oral three times a day  glucagon  Injectable 1 milliGRAM(s) IntraMuscular once  indomethacin Suppository 100 milliGRAM(s) Rectal once  insulin glargine Injectable (LANTUS) 10 Unit(s) SubCutaneous at bedtime  insulin lispro (ADMELOG) corrective regimen sliding scale   SubCutaneous every 6 hours  metoprolol succinate  milliGRAM(s) Oral daily  piperacillin/tazobactam IVPB.. 3.375 Gram(s) IV Intermittent every 12 hours  sodium chloride 0.9%. 300 milliLiter(s) (50 mL/Hr) IV Continuous <Continuous>    MEDICATIONS  (PRN):  acetaminophen     Tablet .. 650 milliGRAM(s) Oral every 6 hours PRN Temp greater or equal to 38C (100.4F), Mild Pain (1 - 3), Moderate Pain (4 - 6)  albuterol/ipratropium for Nebulization 3 milliLiter(s) Nebulizer every 6 hours PRN Shortness of Breath and/or Wheezing  aluminum hydroxide/magnesium hydroxide/simethicone Suspension 30 milliLiter(s) Oral every 6 hours PRN Dyspepsia  dextrose Oral Gel 15 Gram(s) Oral once PRN Blood Glucose LESS THAN 70 milliGRAM(s)/deciliter  sodium chloride 0.9% lock flush 10 milliLiter(s) IV Push every 1 hour PRN Pre/post blood products, medications, blood draw, and to maintain line patency      CAPILLARY BLOOD GLUCOSE      POCT Blood Glucose.: 148 mg/dL (24 Jan 2023 21:17)  POCT Blood Glucose.: 170 mg/dL (24 Jan 2023 16:25)  POCT Blood Glucose.: 102 mg/dL (24 Jan 2023 12:15)    I&O's Summary    24 Jan 2023 07:01  -  25 Jan 2023 07:00  --------------------------------------------------------  IN: 540 mL / OUT: 800 mL / NET: -260 mL        VITALS:   T(C): 36.6 (01-25-23 @ 05:48), Max: 37.2 (01-24-23 @ 16:30)  HR: 70 (01-25-23 @ 05:48) (69 - 85)  BP: 106/61 (01-25-23 @ 05:48) (93/50 - 120/74)  RR: 18 (01-25-23 @ 05:48) (18 - 20)  SpO2: 97% (01-25-23 @ 05:48) (93% - 97%)      GENERAL: Older woman in NAD, lying in bed comfortably  ENT: Moist mucous membranes  NECK: Supple   CHEST/LUNG: End expiratory wheezing appreciated bilaterally, faint inspiratory crackles  HEART: Regular rate and rhythm; No murmurs, rubs, or gallops  ABDOMEN: mild RUQ tenderness TTP Soft, Nontender, Nondistended. No hepatomegaly  EXTREMITIES: +bilateral UE lymphedema R > L, 1+ bilateral LE pitting edema, 2+ peripheral pulses bilaterally  NERVOUS SYSTEM:  Alert & Oriented X3, speech clear. No deficits   MSK: FROM all 4 extremities, full and equal strength      LABS:                          10.1   15.01 )-----------( 226      ( 25 Jan 2023 07:17 )             32.8     01-25    136  |  98  |  51<H>  ----------------------------<  112<H>  3.9   |  21<L>  |  4.09<H>    Ca    9.1      25 Jan 2023 07:17  Phos  4.9     01-25  Mg     2.1     01-25    TPro  6.0  /  Alb  2.5<L>  /  TBili  0.3  /  DBili  x   /  AST  13  /  ALT  12  /  AlkPhos  118  01-25    PT/INR - ( 25 Jan 2023 07:17 )   PT: 12.1 sec;   INR: 1.05 ratio         PTT - ( 25 Jan 2023 07:17 )  PTT:29.9 sec        Culture - Blood (collected 22 Jan 2023 10:05)  Source: .Blood Blood-Peripheral  Preliminary Report (23 Jan 2023 16:01):    No growth to date.    Culture - Blood (collected 22 Jan 2023 10:00)  Source: .Blood Blood-Peripheral  Preliminary Report (23 Jan 2023 16:01):    No growth to date.

## 2023-01-25 NOTE — PRE PROCEDURE NOTE - PRE PROCEDURE EVALUATION
Interventional Radiology Pre Procedure Note    HPI: 82y Female with acute cholecystitis not improving on antibiotics. Cholecystostomy tube placement is requested.     Allergies: CT scan dye (Hives)  penicillin (Unknown)    Medications (Abx/Cardiac/Anticoagulation/Blood Products)    heparin   Injectable: 5000 Unit(s) SubCutaneous (01-24 @ 06:04)  metoprolol succinate ER: 150 milliGRAM(s) Oral (01-25 @ 06:59)  piperacillin/tazobactam IVPB..: 25 mL/Hr IV Intermittent (01-25 @ 06:58)  piperacillin/tazobactam IVPB..: 25 mL/Hr IV Intermittent (01-24 @ 17:28)    Data:    T(C): 36.6  HR: 73  BP: 120/67  RR: 18  SpO2: 95%    Exam  General: No acute distress  Chest: Non labored breathing    -WBC 15.01 / HgB 10.1 / Hct 32.8 / Plt 226  -Na 136 / Cl 98 / BUN 51 / Glucose 112  -K 3.9 / CO2 21 / Cr 4.09  -ALT 12 / Alk Phos 118 / T.Bili 0.3  -INR1.05    Plan: 82y Female presents for cholecystostomy tube placement. Procedure and risks discussed with patient's daughter and she is agreeable to proceed. DNR reversed for the procedure.   -Risks/Benefits/alternatives explained with the patient and/or healthcare proxy and witnessed informed consent obtained.

## 2023-01-25 NOTE — PROGRESS NOTE ADULT - SUBJECTIVE AND OBJECTIVE BOX
DATE OF SERVICE: 01-25-23 @ 15:28    Patient is a 82y old  Female who presents with a chief complaint of acute cholecystitis, DKA (25 Jan 2023 10:33)      INTERVAL HISTORY: Feels ok.     REVIEW OF SYSTEMS:  CONSTITUTIONAL: No weakness  EYES/ENT: No visual changes;  No throat pain   NECK: No pain or stiffness  RESPIRATORY: No cough, wheezing; No shortness of breath  CARDIOVASCULAR: No chest pain or palpitations  GASTROINTESTINAL: No abdominal  pain. No nausea, vomiting, or hematemesis  GENITOURINARY: No dysuria, frequency or hematuria  NEUROLOGICAL: No stroke like symptoms  SKIN: No rashes      	  MEDICATIONS:  metoprolol succinate  milliGRAM(s) Oral daily        PHYSICAL EXAM:  T(C): 36.6 (01-25-23 @ 12:50), Max: 37.2 (01-24-23 @ 16:30)  HR: 72 (01-25-23 @ 14:20) (69 - 83)  BP: 105/54 (01-25-23 @ 14:20) (93/50 - 120/74)  RR: 18 (01-25-23 @ 14:20) (15 - 20)  SpO2: 99% (01-25-23 @ 14:20) (93% - 99%)  Wt(kg): --  I&O's Summary    24 Jan 2023 07:01  -  25 Jan 2023 07:00  --------------------------------------------------------  IN: 540 mL / OUT: 800 mL / NET: -260 mL    25 Jan 2023 07:01  -  25 Jan 2023 15:28  --------------------------------------------------------  IN: 0 mL / OUT: 200 mL / NET: -200 mL          Appearance: In no distress	  HEENT:    PERRL, EOMI	  Cardiovascular:  S1 S2, No JVD  Respiratory: Lungs clear to auscultation	  Gastrointestinal:  Soft, Non-tender, + BS	  Vascularature:  No edema of LE  Psychiatric: Appropriate affect   Neuro: no acute focal deficits                               10.1   15.01 )-----------( 226      ( 25 Jan 2023 07:17 )             32.8     01-25    136  |  98  |  51<H>  ----------------------------<  112<H>  3.9   |  21<L>  |  4.09<H>    Ca    9.1      25 Jan 2023 07:17  Phos  4.9     01-25  Mg     2.1     01-25    TPro  6.0  /  Alb  2.5<L>  /  TBili  0.3  /  DBili  x   /  AST  13  /  ALT  12  /  AlkPhos  118  01-25        Labs personally reviewed      ASSESSMENT/PLAN: 	      80 year old woman with CAD s/p CABG in 2020, prior DVT on Eliquis, HTN, and compensated diastolic CHF presents with acute cholecystitis.       #Post-operative risk evaluation-  S/p ERCP with CBD stent. Tolerate procedure well.   Ms. Batista displays no signs or symptoms of acute coronary ischemia or decompensated CHF.  Admission EKG is sinus with pre-existing anteroseptal q-waves.  Recent echo noted- normal LV systolic function with no hemodynamically significant valvular disease.  IR reconsulted for drainage planned for today, 1/25.   Pt may proceed from a cardiac standpoint.     #Compensated diastolic CHF-  Resume PO Diuretics following procedure.    #CAD s/p CABG-  No signs of active ischemic.  ASA on hold  Statin as ordered.    #Sepsis   - 2/2 acute cholecystitis   - S/p CBD stent  - Plan for IR drain 1/25          FABIOLA Smith-SEE Winchester DO [facility]  Cardiovascular Medicine  [address], [address]  Available through call or text on [employer]  Office: [phone]

## 2023-01-25 NOTE — PROGRESS NOTE ADULT - ASSESSMENT
Patient is a 82 year old F with a PMHx of HTN, HLD, T2DM, HFpEF (EF 65% X/2022), breast cancer s/p R partial mastectomy, rectal carcinoma s/p resection, CAD s/p CABG who presents for 1 week of intermittent abdominal pain associated with nausea and vomiting, found to have acute cholecystitis with choledocholithiasis. Patient is now s/p ERCP with CBD placement but found to have new fluid collection adjacent to the gallbladder neck, pending IR consult for possible percutaneous cholecystostomy.

## 2023-01-25 NOTE — PROGRESS NOTE ADULT - ATTENDING COMMENTS
DATE OF SERVICE: 01-25-23 @ 11:16    Seen and examined. No pain today  WBC 15, Cr 4.09    pending IR drainage of collection, possible perc rissa  continue abx  No surgical intervention at this time, remains a high risk poor candidate

## 2023-01-25 NOTE — PROGRESS NOTE ADULT - PROBLEM SELECTOR PLAN 2
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

## 2023-01-25 NOTE — PRE-ANESTHESIA EVALUATION ADULT - NSANTHAPLANRD_GEN_ALL_CORE
Problem: Adult Inpatient Plan of Care  Goal: Plan of Care Review  Outcome: Ongoing (interventions implemented as appropriate)  AAOx4. Vitals are as charted. NADN. No n/v. GALDINO drain intact. Pain managed with pain medication. Plan reviewed with patient. Tolerating Diet. Q 2 hour rounding for pt care and safety. Turned q 2 hours. No falls reported. Safety precautions maintained. Call light in reach.         
general
monitored anesthesia care (MAC)

## 2023-01-25 NOTE — PROGRESS NOTE ADULT - ASSESSMENT
no distress on NC   afebrile  breathing ok     acetaminophen     Tablet .. 650 milliGRAM(s) Oral every 6 hours PRN  aluminum hydroxide/magnesium hydroxide/simethicone Suspension 30 milliLiter(s) Oral every 6 hours PRN  aspirin  chewable 81 milliGRAM(s) Oral daily  atorvastatin 40 milliGRAM(s) Oral at bedtime  chlorhexidine 4% Liquid 1 Application(s) Topical <User Schedule>  dextrose 5%. 1000 milliLiter(s) IV Continuous <Continuous>  dextrose 5%. 1000 milliLiter(s) IV Continuous <Continuous>  dextrose 50% Injectable 25 Gram(s) IV Push once  dextrose 50% Injectable 12.5 Gram(s) IV Push once  dextrose 50% Injectable 25 Gram(s) IV Push once  dextrose Oral Gel 15 Gram(s) Oral once PRN  gabapentin 200 milliGRAM(s) Oral three times a day  glucagon  Injectable 1 milliGRAM(s) IntraMuscular once  heparin   Injectable 5000 Unit(s) SubCutaneous every 8 hours  indomethacin Suppository 100 milliGRAM(s) Rectal once  insulin glargine Injectable (LANTUS) 10 Unit(s) SubCutaneous at bedtime  insulin lispro (ADMELOG) corrective regimen sliding scale   SubCutaneous Before meals and at bedtime  metoprolol succinate  milliGRAM(s) Oral daily  morphine  - Injectable 1 milliGRAM(s) IV Push every 6 hours PRN  piperacillin/tazobactam IVPB.. 3.375 Gram(s) IV Intermittent every 8 hours  sodium chloride 0.9% lock flush 10 milliLiter(s) IV Push every 1 hour PRN  spironolactone 12.5 milliGRAM(s) Oral daily      VITAL:  T(C): , Max: 37.3 (01-21-23 @ 13:21)  T(F): , Max: 99.1 (01-21-23 @ 13:21)  HR: 88 (01-21-23 @ 13:21)  BP: 100/60 (01-21-23 @ 13:21)  BP(mean): --  RR: 18 (01-21-23 @ 13:21)  SpO2: 96% (01-21-23 @ 13:21)  Wt(kg): --    01-20-23 @ 07:01  -  01-21-23 @ 07:00  --------------------------------------------------------  IN: 440 mL / OUT: 50 mL / NET: 390 mL    01-21-23 @ 07:01  -  01-21-23 @ 14:41  --------------------------------------------------------  IN: 850 mL / OUT: 250 mL / NET: 600 mL        PHYSICAL EXAM:  General: NAD; Alert  HEENT:  NCAT; PERRLA  Neck: No JVD; supple  Respiratory: CTA-b/l  Cardiac: RRR s1s2  Gastrointestinal: BS+, soft, NT/ND  Urologic: No chen  Extremities: No peripheral edema  Access:     LABS:                          11.5   13.38 )-----------( 188      ( 21 Jan 2023 06:59 )             37.6     Na(137)/K(4.0)/Cl(100)/HCO3(26)/BUN(43)/Cr(2.67)Glu(151)/Ca(8.7)/Mg(1.9)/PO4(3.8)    01-21 @ 06:59  Na(138)/K(4.1)/Cl(101)/HCO3(26)/BUN(38)/Cr(2.44)Glu(145)/Ca(9.0)/Mg(1.9)/PO4(3.5)    01-20 @ 06:35  Na(139)/K(4.1)/Cl(100)/HCO3(26)/BUN(26)/Cr(1.62)Glu(203)/Ca(9.4)/Mg(--)/PO4(--)    01-19 @ 06:58  Na(136)/K(4.2)/Cl(98)/HCO3(24)/BUN(31)/Cr(1.58)Glu(323)/Ca(9.4)/Mg(--)/PO4(--)    01-18 @ 16:14      Sodium, Random Urine: 27 mmol/L (01-21 @ 06:50)  Osmolality, Random Urine: 368 mos/kg (01-21 @ 06:50)  Potassium, Random Urine: 53 mmol/L (01-21 @ 06:50)    IMAGES:  CONTRAST/COMPLICATIONS:  IV Contrast: NONE  Oral Contrast: NONE  Complications: None reported at time of study completion    PROCEDURE:  CT of the Abdomen and Pelvis was performed.  Sagittal and coronal reformats were performed.    FINDINGS:  LOWER CHEST: Small bilateral pleural effusions. Cardiomegaly and coronary   artery calcifications. Partially imaged sternotomy.    LIVER: Within normal limits.  BILE DUCTS: Biliary stent in place. No intrahepatic biliary ductal   dilatation. Persistent CBD stones adjacent to the stent.  GALLBLADDER: Cholelithiasis. Ill-defined collection in the region of the   gallbladder neck and common bile duct measuring approximately 4.6 x 3.0   cm also encompassing the secondportion of the duodenum.  SPLEEN: Within normal limits.  PANCREAS: Fatty atrophy.  ADRENALS: Within normal limits.  KIDNEYS/URETERS: Unchanged chronically hydronephrotic right kidney with   right renal atrophy.    BLADDER: Within normal limits.  REPRODUCTIVE ORGANS: Myomatous uterus.    BOWEL: Rectosigmoid and small bowel anastomoses. No bowel obstruction.  PERITONEUM: No ascites.  VESSELS: Atherosclerotic changes.  RETROPERITONEUM/LYMPH NODES: No lymphadenopathy.  ABDOMINAL WALL: Redemonstrated small to moderate fat-containing ventral   hernia.  BONES: Right total hip arthroplasty. Degenerative changes.    IMPRESSION:  New ill-defined collection in the region of the gallbladder neck and   common bile duct also encompassing the second portionof the duodenum.   Findings may be related to bile leak, perforated cholecystitis, or   duodenal hematoma. Recommend further evaluation with MRI.    Interval placement of a stent within the common bile duct with a residual   CBD stones adjacent to the stent.    --- End of Report ---            ASSESSMENT/PLAN  Patient is a 80 year old F with a PMHx of HTN, HLD, T2DM, HFpEF (EF 65% X/2022), breast cancer s/p R partial mastectomy, rectal carcinoma s/p resection, CAD s/p CABG (11/2020) who presents for 1 day of progressively worsening abdominal pain associated with nausea and NBNB vomiting. 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  low nephron mass   non oliguric harleen likely in the view of relative  hypotension   CVS----BP  soft   GI:  acute cholecystitis --- ERCP with stent placement  1/20/23  biliary leak ---IR and surgical evaluation     RECOMMENDATION:  1)Renal: worsening cr  non oliguric harleen  a-w LR for 10 h   avoid nephrotoxic medication  monitor off diuretics.  daily bmp  2)CVS: euvolemic ,BP soft ,  metoprolol  with holding parameters   3)ID:on IV abx  4) GI ---cholecystotomy tube placement  1/25/23           [facility]  Office: [phone]   no distress on NC   breathing ok     acetaminophen     Tablet .. 650 milliGRAM(s) Oral every 6 hours PRN  aluminum hydroxide/magnesium hydroxide/simethicone Suspension 30 milliLiter(s) Oral every 6 hours PRN  aspirin  chewable 81 milliGRAM(s) Oral daily  atorvastatin 40 milliGRAM(s) Oral at bedtime  chlorhexidine 4% Liquid 1 Application(s) Topical <User Schedule>  dextrose 5%. 1000 milliLiter(s) IV Continuous <Continuous>  dextrose 5%. 1000 milliLiter(s) IV Continuous <Continuous>  dextrose 50% Injectable 25 Gram(s) IV Push once  dextrose 50% Injectable 12.5 Gram(s) IV Push once  dextrose 50% Injectable 25 Gram(s) IV Push once  dextrose Oral Gel 15 Gram(s) Oral once PRN  gabapentin 200 milliGRAM(s) Oral three times a day  glucagon  Injectable 1 milliGRAM(s) IntraMuscular once  heparin   Injectable 5000 Unit(s) SubCutaneous every 8 hours  indomethacin Suppository 100 milliGRAM(s) Rectal once  insulin glargine Injectable (LANTUS) 10 Unit(s) SubCutaneous at bedtime  insulin lispro (ADMELOG) corrective regimen sliding scale   SubCutaneous Before meals and at bedtime  metoprolol succinate  milliGRAM(s) Oral daily  morphine  - Injectable 1 milliGRAM(s) IV Push every 6 hours PRN  piperacillin/tazobactam IVPB.. 3.375 Gram(s) IV Intermittent every 8 hours  sodium chloride 0.9% lock flush 10 milliLiter(s) IV Push every 1 hour PRN  spironolactone 12.5 milliGRAM(s) Oral daily      VITAL:  T(C): , Max: 37.3 (01-21-23 @ 13:21)  T(F): , Max: 99.1 (01-21-23 @ 13:21)  HR: 88 (01-21-23 @ 13:21)  BP: 100/60 (01-21-23 @ 13:21)  BP(mean): --  RR: 18 (01-21-23 @ 13:21)  SpO2: 96% (01-21-23 @ 13:21)  Wt(kg): --    01-20-23 @ 07:01  -  01-21-23 @ 07:00  --------------------------------------------------------  IN: 440 mL / OUT: 50 mL / NET: 390 mL    01-21-23 @ 07:01  -  01-21-23 @ 14:41  --------------------------------------------------------  IN: 850 mL / OUT: 250 mL / NET: 600 mL        PHYSICAL EXAM:  General: NAD; Alert  HEENT:  NCAT; PERRLA  Neck: No JVD; supple  Respiratory: CTA-b/l  Cardiac: RRR s1s2  Gastrointestinal: BS+, soft, NT/ND , biliary drain in placed   Urologic: No chen  Extremities: No peripheral edema  Access:     LABS:                          11.5   13.38 )-----------( 188      ( 21 Jan 2023 06:59 )             37.6     Na(137)/K(4.0)/Cl(100)/HCO3(26)/BUN(43)/Cr(2.67)Glu(151)/Ca(8.7)/Mg(1.9)/PO4(3.8)    01-21 @ 06:59  Na(138)/K(4.1)/Cl(101)/HCO3(26)/BUN(38)/Cr(2.44)Glu(145)/Ca(9.0)/Mg(1.9)/PO4(3.5)    01-20 @ 06:35  Na(139)/K(4.1)/Cl(100)/HCO3(26)/BUN(26)/Cr(1.62)Glu(203)/Ca(9.4)/Mg(--)/PO4(--)    01-19 @ 06:58  Na(136)/K(4.2)/Cl(98)/HCO3(24)/BUN(31)/Cr(1.58)Glu(323)/Ca(9.4)/Mg(--)/PO4(--)    01-18 @ 16:14      Sodium, Random Urine: 27 mmol/L (01-21 @ 06:50)  Osmolality, Random Urine: 368 mos/kg (01-21 @ 06:50)  Potassium, Random Urine: 53 mmol/L (01-21 @ 06:50)    IMAGES:  CONTRAST/COMPLICATIONS:  IV Contrast: NONE  Oral Contrast: NONE  Complications: None reported at time of study completion    PROCEDURE:  CT of the Abdomen and Pelvis was performed.  Sagittal and coronal reformats were performed.    FINDINGS:  LOWER CHEST: Small bilateral pleural effusions. Cardiomegaly and coronary   artery calcifications. Partially imaged sternotomy.    LIVER: Within normal limits.  BILE DUCTS: Biliary stent in place. No intrahepatic biliary ductal   dilatation. Persistent CBD stones adjacent to the stent.  GALLBLADDER: Cholelithiasis. Ill-defined collection in the region of the   gallbladder neck and common bile duct measuring approximately 4.6 x 3.0   cm also encompassing the secondportion of the duodenum.  SPLEEN: Within normal limits.  PANCREAS: Fatty atrophy.  ADRENALS: Within normal limits.  KIDNEYS/URETERS: Unchanged chronically hydronephrotic right kidney with   right renal atrophy.    BLADDER: Within normal limits.  REPRODUCTIVE ORGANS: Myomatous uterus.    BOWEL: Rectosigmoid and small bowel anastomoses. No bowel obstruction.  PERITONEUM: No ascites.  VESSELS: Atherosclerotic changes.  RETROPERITONEUM/LYMPH NODES: No lymphadenopathy.  ABDOMINAL WALL: Redemonstrated small to moderate fat-containing ventral   hernia.  BONES: Right total hip arthroplasty. Degenerative changes.    IMPRESSION:  New ill-defined collection in the region of the gallbladder neck and   common bile duct also encompassing the second portionof the duodenum.   Findings may be related to bile leak, perforated cholecystitis, or   duodenal hematoma. Recommend further evaluation with MRI.    Interval placement of a stent within the common bile duct with a residual   CBD stones adjacent to the stent.    --- End of Report ---            ASSESSMENT/PLAN  Patient is a 80 year old F with a PMHx of HTN, HLD, T2DM, HFpEF (EF 65% X/2022), breast cancer s/p R partial mastectomy, rectal carcinoma s/p resection, CAD s/p CABG (11/2020) who presents for 1 day of progressively worsening abdominal pain associated with nausea and NBNB vomiting. 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  low nephron mass   non oliguric harleen likely in the view of relative  hypotension   CVS----BP  soft   GI:  acute cholecystitis --- ERCP with stent placement  1/20/23  biliary leak ---IR and surgical evaluation     RECOMMENDATION:  1)Renal: worsening cr  non oliguric harleen  a-w LR for 10 h   avoid nephrotoxic medication  monitor off diuretics.  daily bmp  2)CVS: euvolemic ,BP soft ,  metoprolol  with holding parameters   3)ID:on IV abx  4) GI ---cholecystotomy tube placement  1/25/23         [facility]  Office: [phone]   no distress on NC   breathing ok     acetaminophen     Tablet .. 650 milliGRAM(s) Oral every 6 hours PRN  aluminum hydroxide/magnesium hydroxide/simethicone Suspension 30 milliLiter(s) Oral every 6 hours PRN  aspirin  chewable 81 milliGRAM(s) Oral daily  atorvastatin 40 milliGRAM(s) Oral at bedtime  chlorhexidine 4% Liquid 1 Application(s) Topical <User Schedule>  dextrose 5%. 1000 milliLiter(s) IV Continuous <Continuous>  dextrose 5%. 1000 milliLiter(s) IV Continuous <Continuous>  dextrose 50% Injectable 25 Gram(s) IV Push once  dextrose 50% Injectable 12.5 Gram(s) IV Push once  dextrose 50% Injectable 25 Gram(s) IV Push once  dextrose Oral Gel 15 Gram(s) Oral once PRN  gabapentin 200 milliGRAM(s) Oral three times a day  glucagon  Injectable 1 milliGRAM(s) IntraMuscular once  heparin   Injectable 5000 Unit(s) SubCutaneous every 8 hours  indomethacin Suppository 100 milliGRAM(s) Rectal once  insulin glargine Injectable (LANTUS) 10 Unit(s) SubCutaneous at bedtime  insulin lispro (ADMELOG) corrective regimen sliding scale   SubCutaneous Before meals and at bedtime  metoprolol succinate  milliGRAM(s) Oral daily  morphine  - Injectable 1 milliGRAM(s) IV Push every 6 hours PRN  piperacillin/tazobactam IVPB.. 3.375 Gram(s) IV Intermittent every 8 hours  sodium chloride 0.9% lock flush 10 milliLiter(s) IV Push every 1 hour PRN  spironolactone 12.5 milliGRAM(s) Oral daily      VITAL:  T(C): , Max: 37.3 (01-21-23 @ 13:21)  T(F): , Max: 99.1 (01-21-23 @ 13:21)  HR: 88 (01-21-23 @ 13:21)  BP: 100/60 (01-21-23 @ 13:21)  BP(mean): --  RR: 18 (01-21-23 @ 13:21)  SpO2: 96% (01-21-23 @ 13:21)  Wt(kg): --    01-20-23 @ 07:01  -  01-21-23 @ 07:00  --------------------------------------------------------  IN: 440 mL / OUT: 50 mL / NET: 390 mL    01-21-23 @ 07:01  -  01-21-23 @ 14:41  --------------------------------------------------------  IN: 850 mL / OUT: 250 mL / NET: 600 mL        PHYSICAL EXAM:  General: NAD; Alert  HEENT:  NCAT; PERRLA  Neck: No JVD; supple  Respiratory: CTA-b/l  Cardiac: RRR s1s2  Gastrointestinal: BS+, soft, NT/ND , biliary drain in placed   Urologic: No chen  Extremities: No peripheral edema  Access:     LABS:                          11.5   13.38 )-----------( 188      ( 21 Jan 2023 06:59 )             37.6     Na(137)/K(4.0)/Cl(100)/HCO3(26)/BUN(43)/Cr(2.67)Glu(151)/Ca(8.7)/Mg(1.9)/PO4(3.8)    01-21 @ 06:59  Na(138)/K(4.1)/Cl(101)/HCO3(26)/BUN(38)/Cr(2.44)Glu(145)/Ca(9.0)/Mg(1.9)/PO4(3.5)    01-20 @ 06:35  Na(139)/K(4.1)/Cl(100)/HCO3(26)/BUN(26)/Cr(1.62)Glu(203)/Ca(9.4)/Mg(--)/PO4(--)    01-19 @ 06:58  Na(136)/K(4.2)/Cl(98)/HCO3(24)/BUN(31)/Cr(1.58)Glu(323)/Ca(9.4)/Mg(--)/PO4(--)    01-18 @ 16:14      Sodium, Random Urine: 27 mmol/L (01-21 @ 06:50)  Osmolality, Random Urine: 368 mos/kg (01-21 @ 06:50)  Potassium, Random Urine: 53 mmol/L (01-21 @ 06:50)    IMAGES:  CONTRAST/COMPLICATIONS:  IV Contrast: NONE  Oral Contrast: NONE  Complications: None reported at time of study completion    PROCEDURE:  CT of the Abdomen and Pelvis was performed.  Sagittal and coronal reformats were performed.    FINDINGS:  LOWER CHEST: Small bilateral pleural effusions. Cardiomegaly and coronary   artery calcifications. Partially imaged sternotomy.    LIVER: Within normal limits.  BILE DUCTS: Biliary stent in place. No intrahepatic biliary ductal   dilatation. Persistent CBD stones adjacent to the stent.  GALLBLADDER: Cholelithiasis. Ill-defined collection in the region of the   gallbladder neck and common bile duct measuring approximately 4.6 x 3.0   cm also encompassing the secondportion of the duodenum.  SPLEEN: Within normal limits.  PANCREAS: Fatty atrophy.  ADRENALS: Within normal limits.  KIDNEYS/URETERS: Unchanged chronically hydronephrotic right kidney with   right renal atrophy.    BLADDER: Within normal limits.  REPRODUCTIVE ORGANS: Myomatous uterus.    BOWEL: Rectosigmoid and small bowel anastomoses. No bowel obstruction.  PERITONEUM: No ascites.  VESSELS: Atherosclerotic changes.  RETROPERITONEUM/LYMPH NODES: No lymphadenopathy.  ABDOMINAL WALL: Redemonstrated small to moderate fat-containing ventral   hernia.  BONES: Right total hip arthroplasty. Degenerative changes.    IMPRESSION:  New ill-defined collection in the region of the gallbladder neck and   common bile duct also encompassing the second portionof the duodenum.   Findings may be related to bile leak, perforated cholecystitis, or   duodenal hematoma. Recommend further evaluation with MRI.    Interval placement of a stent within the common bile duct with a residual   CBD stones adjacent to the stent.    --- End of Report ---            ASSESSMENT/PLAN  Patient is a 80 year old F with a PMHx of HTN, HLD, T2DM, HFpEF (EF 65% X/2022), breast cancer s/p R partial mastectomy, rectal carcinoma s/p resection, CAD s/p CABG (11/2020) who presents for 1 day of progressively worsening abdominal pain associated with nausea and NBNB vomiting. 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  low nephron mass   non oliguric harleen likely in the view of relative  hypotension   CVS----BP  soft   GI:  acute cholecystitis --- ERCP with stent placement  1/20/23  biliary leak ---IR and surgical evaluation     RECOMMENDATION:  1)Renal: worsening cr  non oliguric harleen  a-w LR  75  cc/h for 5 h ,  monitor resp status   avoid nephrotoxic medication  monitor off diuretics.  daily bmp  2)CVS: euvolemic ,BP soft ,  metoprolol  with holding parameters   3)ID:on IV abx  4) GI ---cholecystotomy tube placement  1/25/23         [facility]  Office: [phone]

## 2023-01-25 NOTE — PROGRESS NOTE ADULT - ATTENDING COMMENTS
82F with h/o HTN, T2DM, HFpEF (EF 65%, normal LV in 2022), breast cancer s/p R partial mastectomy, rectal carcinoma s/p resection, CAD s/p CABG who presents for 1 week of intermittent abdominal pain associated with nausea and vomiting.    1. Acute cholecystitis with choledocholithiasis   2. Uncontrolled T2DM with hyperglycemia  3. TAO on CKD 3, pre-renal  4. Chronic hydronephrosis  5. HTN  6. h/o CABG    No abd pain, N/V.     - s/p ERCP by GI on 1/20:  large CBD stones on fluoro and relatively small sized ampulla; +small periampullary diverticula; s/p sphincterotomy + plastic CBD stent placement with successful drainage of bile/sludge. patient will need  repeat ERCP in 2-3 months.  - Abd pain after procedure w/ fever - CT suggestive of fluid collection and MRI with ultiloculated fluid collection adjacent to the gallbladder   - plan for perc rissa today with IR  - Monitor leukocytosis post procedure, continue ZOsyn  - Cr uptrended today. Give another trial of IVF and monitor BMP    d/w daughter at bedside.     Rest as above.

## 2023-01-25 NOTE — PROGRESS NOTE ADULT - PROBLEM SELECTOR PLAN 6
Diet: NPO  DVT PPx: heparin subq (held in case of IR procedure)  Dispo: pending clinical improvement Diet: regular, CC  DVT PPx: heparin subq (held in case of IR procedure)  Dispo: pending clinical improvement

## 2023-01-25 NOTE — PROGRESS NOTE ADULT - SUBJECTIVE AND OBJECTIVE BOX
SUBJECTIVE:   Seen and examined at bedside. no acute events overnight     OBJECTIVE: T(C): 36.8 (01-25-23 @ 07:53), Max: 37.2 (01-24-23 @ 16:30)  HR: 83 (01-25-23 @ 07:53) (69 - 83)  BP: 98/63 (01-25-23 @ 07:53) (93/50 - 120/74)  RR: 18 (01-25-23 @ 07:53) (18 - 20)  SpO2: 97% (01-25-23 @ 07:53) (93% - 97%)  Wt(kg): --  I&O's Summary    24 Jan 2023 07:01  -  25 Jan 2023 07:00  --------------------------------------------------------  IN: 540 mL / OUT: 800 mL / NET: -260 mL      I&O's Detail    24 Jan 2023 07:01  -  25 Jan 2023 07:00  --------------------------------------------------------  IN:    IV PiggyBack: 100 mL    Oral Fluid: 440 mL  Total IN: 540 mL    OUT:    Voided (mL): 800 mL  Total OUT: 800 mL    Total NET: -260 mL      Physical Exam  General: NAD  Resp: nonlabored   Abdomen: soft, minimally tender in RUQ, nondistended  Vasc: WWP    MEDICATIONS  (STANDING):  atorvastatin 40 milliGRAM(s) Oral at bedtime  chlorhexidine 4% Liquid 1 Application(s) Topical <User Schedule>  dextrose 5%. 1000 milliLiter(s) (100 mL/Hr) IV Continuous <Continuous>  dextrose 5%. 1000 milliLiter(s) (50 mL/Hr) IV Continuous <Continuous>  dextrose 50% Injectable 25 Gram(s) IV Push once  dextrose 50% Injectable 12.5 Gram(s) IV Push once  dextrose 50% Injectable 25 Gram(s) IV Push once  gabapentin 200 milliGRAM(s) Oral three times a day  glucagon  Injectable 1 milliGRAM(s) IntraMuscular once  indomethacin Suppository 100 milliGRAM(s) Rectal once  insulin glargine Injectable (LANTUS) 10 Unit(s) SubCutaneous at bedtime  insulin lispro (ADMELOG) corrective regimen sliding scale   SubCutaneous every 6 hours  metoprolol succinate  milliGRAM(s) Oral daily  piperacillin/tazobactam IVPB.. 3.375 Gram(s) IV Intermittent every 12 hours  sodium chloride 0.9%. 300 milliLiter(s) (50 mL/Hr) IV Continuous <Continuous>    MEDICATIONS  (PRN):  acetaminophen     Tablet .. 650 milliGRAM(s) Oral every 6 hours PRN Temp greater or equal to 38C (100.4F), Mild Pain (1 - 3), Moderate Pain (4 - 6)  albuterol/ipratropium for Nebulization 3 milliLiter(s) Nebulizer every 6 hours PRN Shortness of Breath and/or Wheezing  aluminum hydroxide/magnesium hydroxide/simethicone Suspension 30 milliLiter(s) Oral every 6 hours PRN Dyspepsia  dextrose Oral Gel 15 Gram(s) Oral once PRN Blood Glucose LESS THAN 70 milliGRAM(s)/deciliter  sodium chloride 0.9% lock flush 10 milliLiter(s) IV Push every 1 hour PRN Pre/post blood products, medications, blood draw, and to maintain line patency      LABS:                        10.1   15.01 )-----------( 226      ( 25 Jan 2023 07:17 )             32.8     01-25    136  |  98  |  51<H>  ----------------------------<  112<H>  3.9   |  21<L>  |  4.09<H>    Ca    9.1      25 Jan 2023 07:17  Phos  4.9     01-25  Mg     2.1     01-25    TPro  6.0  /  Alb  2.5<L>  /  TBili  0.3  /  DBili  x   /  AST  13  /  ALT  12  /  AlkPhos  118  01-25    PT/INR - ( 25 Jan 2023 07:17 )   PT: 12.1 sec;   INR: 1.05 ratio         PTT - ( 25 Jan 2023 07:17 )  PTT:29.9 sec

## 2023-01-25 NOTE — PROGRESS NOTE ADULT - PROBLEM SELECTOR PLAN 1
Patient presenting with 1 week of abdominal pain, nausea, and vomiting  - Initial CT A/P: acute cholecystitis with 1.2 cm common bile duct dilatation  - per surgery, not a surgical candidate given comorbidities   - s/p ERCP 1/20 : choledocholithiasis w/ large stone w/ sphincterotomy and biliary stent placement.   - patient now with recurrent abdominal pain, and leukocytosis, repeat CT A/P and MRI non con concerning for multiloculated fluid collection around the neck of the gallbladder, possible perf    Plan:  > f/u IR reconsult for perc rissa given multiloculated fluid collection on MRI abdomen w MRCP  > continue with zosyn  > pain/nausea control with tylenol and maalox  > lipase WNL; minimal c/f post ERCP pancreatitis  > AVOID zofran given QTc 518  > trend CMP, coags, otherwise stable  > Patient will need repeat ERCP in 2-3 months for stent removal Patient presenting with 1 week of abdominal pain, nausea, and vomiting  - Initial CT A/P: acute cholecystitis with 1.2 cm common bile duct dilatation  - per surgery, not a surgical candidate given comorbidities   - s/p ERCP 1/20 : choledocholithiasis w/ large stone w/ sphincterotomy and biliary stent placement.   - patient now with recurrent abdominal pain, and leukocytosis, repeat CT A/P and MRI non con concerning for multiloculated fluid collection around the neck of the gallbladder, possible perf    Plan:  > s/p IR placement of perc rissa  > continue with zosyn 7 days after source control (1/25 - )   > pain/nausea control with tylenol and maalox  > lipase WNL; minimal c/f post ERCP pancreatitis  > AVOID zofran given QTc 518  > trend CMP, coags, otherwise stable  > Patient will need repeat ERCP in 2-3 months for stent removal

## 2023-01-25 NOTE — PRE PROCEDURE NOTE - GENERAL PROCEDURE NAME
EGD/EUS/ERCP
cholecystostomy tube placement
You can access the FollowMyHealth Patient Portal offered by [facility] by registering at the following website: [email or web address]. By joining [facility]’s FollowMyHealth portal, you will also be able to view your health information using other applications (apps) compatible with our system.

## 2023-01-25 NOTE — PROGRESS NOTE ADULT - PROBLEM SELECTOR PLAN 3
Patient presenting with mild DKA given BHB of 2 and serum glucose of 450  - resolved, or likely was due to starvation ketosis given AG closed, pH normal, HCO3 normal, glucose improving  - adequate response to 4U regular insulin  - s/p 750 cc IV hydration, judicious hydration given HFpEF  - patient on oral agents: repaglinide and semaglutide  - upon last admission 8/2022, patient was well controlled on lantus 16U QHS and admelog 7U TID  - A1c stable at 9.6    Plan:  > continue with lantus 10U QHS  > moderate dose correctional sliding scale with meals while eating  >monitor FS and add sliding scale as needed Patient presenting with mild DKA given BHB of 2 and serum glucose of 450  - resolved, or likely was due to starvation ketosis given AG closed, pH normal, HCO3 normal, glucose improving  - adequate response to 4U regular insulin  - patient on oral agents: repaglinide and semaglutide  - upon last admission 8/2022, patient was well controlled on lantus 16U QHS and admelog 7U TID  - A1c stable at 9.6    Plan:  > continue with lantus 10U QHS  > moderate dose correctional sliding scale with meals while eating  >monitor FS and add sliding scale as needed

## 2023-01-26 LAB
ALBUMIN SERPL ELPH-MCNC: 2.7 G/DL — LOW (ref 3.3–5)
ALP SERPL-CCNC: 117 U/L — SIGNIFICANT CHANGE UP (ref 40–120)
ALT FLD-CCNC: 10 U/L — SIGNIFICANT CHANGE UP (ref 10–45)
ANION GAP SERPL CALC-SCNC: 15 MMOL/L — SIGNIFICANT CHANGE UP (ref 5–17)
AST SERPL-CCNC: 11 U/L — SIGNIFICANT CHANGE UP (ref 10–40)
BILIRUB SERPL-MCNC: 0.3 MG/DL — SIGNIFICANT CHANGE UP (ref 0.2–1.2)
BUN SERPL-MCNC: 53 MG/DL — HIGH (ref 7–23)
CALCIUM SERPL-MCNC: 8.8 MG/DL — SIGNIFICANT CHANGE UP (ref 8.4–10.5)
CHLORIDE SERPL-SCNC: 98 MMOL/L — SIGNIFICANT CHANGE UP (ref 96–108)
CK MB CFR SERPL CALC: 1 NG/ML — SIGNIFICANT CHANGE UP (ref 0–3.8)
CK SERPL-CCNC: 26 U/L — SIGNIFICANT CHANGE UP (ref 25–170)
CO2 SERPL-SCNC: 20 MMOL/L — LOW (ref 22–31)
CREAT SERPL-MCNC: 3.79 MG/DL — HIGH (ref 0.5–1.3)
CULTURE RESULTS: SIGNIFICANT CHANGE UP
CULTURE RESULTS: SIGNIFICANT CHANGE UP
EGFR: 11 ML/MIN/1.73M2 — LOW
GLUCOSE BLDC GLUCOMTR-MCNC: 191 MG/DL — HIGH (ref 70–99)
GLUCOSE BLDC GLUCOMTR-MCNC: 200 MG/DL — HIGH (ref 70–99)
GLUCOSE BLDC GLUCOMTR-MCNC: 233 MG/DL — HIGH (ref 70–99)
GLUCOSE BLDC GLUCOMTR-MCNC: 235 MG/DL — HIGH (ref 70–99)
GLUCOSE BLDC GLUCOMTR-MCNC: 253 MG/DL — HIGH (ref 70–99)
GLUCOSE SERPL-MCNC: 222 MG/DL — HIGH (ref 70–99)
HCT VFR BLD CALC: 33.5 % — LOW (ref 34.5–45)
HGB BLD-MCNC: 10.4 G/DL — LOW (ref 11.5–15.5)
MAGNESIUM SERPL-MCNC: 1.9 MG/DL — SIGNIFICANT CHANGE UP (ref 1.6–2.6)
MCHC RBC-ENTMCNC: 29.2 PG — SIGNIFICANT CHANGE UP (ref 27–34)
MCHC RBC-ENTMCNC: 31 GM/DL — LOW (ref 32–36)
MCV RBC AUTO: 94.1 FL — SIGNIFICANT CHANGE UP (ref 80–100)
NRBC # BLD: 0 /100 WBCS — SIGNIFICANT CHANGE UP (ref 0–0)
PHOSPHATE SERPL-MCNC: 4 MG/DL — SIGNIFICANT CHANGE UP (ref 2.5–4.5)
PLATELET # BLD AUTO: 271 K/UL — SIGNIFICANT CHANGE UP (ref 150–400)
POTASSIUM SERPL-MCNC: 3.5 MMOL/L — SIGNIFICANT CHANGE UP (ref 3.5–5.3)
POTASSIUM SERPL-SCNC: 3.5 MMOL/L — SIGNIFICANT CHANGE UP (ref 3.5–5.3)
PROT SERPL-MCNC: 6.5 G/DL — SIGNIFICANT CHANGE UP (ref 6–8.3)
RBC # BLD: 3.56 M/UL — LOW (ref 3.8–5.2)
RBC # FLD: 16 % — HIGH (ref 10.3–14.5)
SODIUM SERPL-SCNC: 133 MMOL/L — LOW (ref 135–145)
SPECIMEN SOURCE: SIGNIFICANT CHANGE UP
SPECIMEN SOURCE: SIGNIFICANT CHANGE UP
TROPONIN T, HIGH SENSITIVITY RESULT: 27 NG/L — SIGNIFICANT CHANGE UP (ref 0–51)
WBC # BLD: 13.71 K/UL — HIGH (ref 3.8–10.5)
WBC # FLD AUTO: 13.71 K/UL — HIGH (ref 3.8–10.5)

## 2023-01-26 PROCEDURE — 93010 ELECTROCARDIOGRAM REPORT: CPT

## 2023-01-26 PROCEDURE — 99232 SBSQ HOSP IP/OBS MODERATE 35: CPT

## 2023-01-26 PROCEDURE — 99233 SBSQ HOSP IP/OBS HIGH 50: CPT | Mod: GC

## 2023-01-26 RX ORDER — METOPROLOL TARTRATE 50 MG
75 TABLET ORAL
Refills: 0 | Status: DISCONTINUED | OUTPATIENT
Start: 2023-01-26 | End: 2023-01-27

## 2023-01-26 RX ORDER — METOPROLOL TARTRATE 50 MG
75 TABLET ORAL
Refills: 0 | Status: DISCONTINUED | OUTPATIENT
Start: 2023-01-26 | End: 2023-01-26

## 2023-01-26 RX ORDER — SODIUM CHLORIDE 9 MG/ML
500 INJECTION INTRAMUSCULAR; INTRAVENOUS; SUBCUTANEOUS ONCE
Refills: 0 | Status: COMPLETED | OUTPATIENT
Start: 2023-01-26 | End: 2023-01-26

## 2023-01-26 RX ORDER — SODIUM CHLORIDE 9 MG/ML
500 INJECTION, SOLUTION INTRAVENOUS
Refills: 0 | Status: DISCONTINUED | OUTPATIENT
Start: 2023-01-26 | End: 2023-01-26

## 2023-01-26 RX ORDER — METOPROLOL TARTRATE 50 MG
5 TABLET ORAL ONCE
Refills: 0 | Status: COMPLETED | OUTPATIENT
Start: 2023-01-26 | End: 2023-01-26

## 2023-01-26 RX ORDER — FLUCONAZOLE 150 MG/1
400 TABLET ORAL ONCE
Refills: 0 | Status: COMPLETED | OUTPATIENT
Start: 2023-01-26 | End: 2023-01-26

## 2023-01-26 RX ADMIN — GABAPENTIN 200 MILLIGRAM(S): 400 CAPSULE ORAL at 06:11

## 2023-01-26 RX ADMIN — Medication 650 MILLIGRAM(S): at 13:00

## 2023-01-26 RX ADMIN — SODIUM CHLORIDE 50 MILLILITER(S): 9 INJECTION, SOLUTION INTRAVENOUS at 16:21

## 2023-01-26 RX ADMIN — PIPERACILLIN AND TAZOBACTAM 25 GRAM(S): 4; .5 INJECTION, POWDER, LYOPHILIZED, FOR SOLUTION INTRAVENOUS at 17:29

## 2023-01-26 RX ADMIN — Medication 650 MILLIGRAM(S): at 01:14

## 2023-01-26 RX ADMIN — SODIUM CHLORIDE 500 MILLILITER(S): 9 INJECTION INTRAMUSCULAR; INTRAVENOUS; SUBCUTANEOUS at 01:47

## 2023-01-26 RX ADMIN — Medication 81 MILLIGRAM(S): at 12:02

## 2023-01-26 RX ADMIN — Medication 2: at 12:03

## 2023-01-26 RX ADMIN — HEPARIN SODIUM 5000 UNIT(S): 5000 INJECTION INTRAVENOUS; SUBCUTANEOUS at 13:23

## 2023-01-26 RX ADMIN — Medication 650 MILLIGRAM(S): at 00:44

## 2023-01-26 RX ADMIN — HEPARIN SODIUM 5000 UNIT(S): 5000 INJECTION INTRAVENOUS; SUBCUTANEOUS at 06:11

## 2023-01-26 RX ADMIN — FLUCONAZOLE 400 MILLIGRAM(S): 150 TABLET ORAL at 21:57

## 2023-01-26 RX ADMIN — Medication 6: at 18:44

## 2023-01-26 RX ADMIN — Medication 30 MILLILITER(S): at 13:20

## 2023-01-26 RX ADMIN — Medication 2: at 09:18

## 2023-01-26 RX ADMIN — GABAPENTIN 200 MILLIGRAM(S): 400 CAPSULE ORAL at 13:22

## 2023-01-26 RX ADMIN — Medication 650 MILLIGRAM(S): at 12:02

## 2023-01-26 RX ADMIN — HEPARIN SODIUM 5000 UNIT(S): 5000 INJECTION INTRAVENOUS; SUBCUTANEOUS at 21:56

## 2023-01-26 RX ADMIN — GABAPENTIN 200 MILLIGRAM(S): 400 CAPSULE ORAL at 21:56

## 2023-01-26 RX ADMIN — INSULIN GLARGINE 10 UNIT(S): 100 INJECTION, SOLUTION SUBCUTANEOUS at 22:32

## 2023-01-26 RX ADMIN — ATORVASTATIN CALCIUM 40 MILLIGRAM(S): 80 TABLET, FILM COATED ORAL at 21:56

## 2023-01-26 RX ADMIN — Medication 75 MILLIGRAM(S): at 17:57

## 2023-01-26 RX ADMIN — PIPERACILLIN AND TAZOBACTAM 25 GRAM(S): 4; .5 INJECTION, POWDER, LYOPHILIZED, FOR SOLUTION INTRAVENOUS at 06:11

## 2023-01-26 NOTE — PROGRESS NOTE ADULT - ASSESSMENT
Assessment: 82F with RUQ pain c/s acute cholecystitis and choledocholithiasis with significant co-morbidities including CAD, s/p CABG, CHF, poorly controlled DM, s/p ERCP (1/20). Doing ok, still poor operative candidate. Imaging reviewed and unable to define source of new collection vs inflammatory changes.     Plan:  - Patient remains a poor surgical candidate  - s/p perc rissa tube by IR on 1/25  - continue IVF + IV abx  - continue supportive care  - pain control PRN    [facility]  [id number]

## 2023-01-26 NOTE — PROGRESS NOTE ADULT - SUBJECTIVE AND OBJECTIVE BOX
DATE OF SERVICE: 01-26-23 @ 15:01    Patient is a 82y old  Female who presents with a chief complaint of acute cholecystitis, DKA (26 Jan 2023 09:40)      INTERVAL HISTORY: c/o abdominal and epigastic burning.     REVIEW OF SYSTEMS:  CONSTITUTIONAL: No weakness  EYES/ENT: No visual changes;  No throat pain   NECK: No pain or stiffness  RESPIRATORY: No cough, wheezing; No shortness of breath  CARDIOVASCULAR: No chest pain or palpitations  GASTROINTESTINAL: + abdominal  pain. No nausea, vomiting, or hematemesis  GENITOURINARY: No dysuria, frequency or hematuria  NEUROLOGICAL: No stroke like symptoms  SKIN: No rashes    	  MEDICATIONS:  metoprolol succinate  milliGRAM(s) Oral daily        PHYSICAL EXAM:  T(C): 36.9 (01-26-23 @ 13:09), Max: 37.2 (01-25-23 @ 16:31)  HR: 82 (01-26-23 @ 13:09) (75 - 145)  BP: 117/75 (01-26-23 @ 13:09) (85/59 - 117/75)  RR: 18 (01-26-23 @ 13:09) (18 - 18)  SpO2: 95% (01-26-23 @ 13:09) (94% - 97%)  Wt(kg): --  I&O's Summary    25 Jan 2023 07:01  -  26 Jan 2023 07:00  --------------------------------------------------------  IN: 0 mL / OUT: 1000 mL / NET: -1000 mL    26 Jan 2023 07:01  -  26 Jan 2023 15:01  --------------------------------------------------------  IN: 560 mL / OUT: 150 mL / NET: 410 mL          Appearance: In no distress	  HEENT:    PERRL, EOMI	  Cardiovascular:  S1 S2, No JVD  Respiratory: Lungs clear to auscultation	  Gastrointestinal:  Soft, Non-tender, + BS	  Vascularature:  No edema of LE  Psychiatric: Appropriate affect   Neuro: no acute focal deficits                               10.4   13.71 )-----------( 271      ( 26 Jan 2023 14:13 )             33.5     01-26    133<L>  |  98  |  53<H>  ----------------------------<  222<H>  3.5   |  20<L>  |  3.79<H>    Ca    8.8      26 Jan 2023 14:13  Phos  4.0     01-26  Mg     1.9     01-26    TPro  6.5  /  Alb  2.7<L>  /  TBili  0.3  /  DBili  x   /  AST  11  /  ALT  10  /  AlkPhos  117  01-26        Labs personally reviewed      ASSESSMENT/PLAN: 	            Delaney Vasquez, FABIOLA-NP   Sergey Winchester DO [facility]  Cardiovascular Medicine  [address] [place] [address], [address]  Available through call or text on [employer] TE[employer]  Office: [phone]   DATE OF SERVICE: 01-26-23 @ 15:01    Patient is a 82y old  Female who presents with a chief complaint of acute cholecystitis, DKA (26 Jan 2023 09:40)      INTERVAL HISTORY: c/o abdominal and epigastic burning.     REVIEW OF SYSTEMS:  CONSTITUTIONAL: No weakness  EYES/ENT: No visual changes;  No throat pain   NECK: No pain or stiffness  RESPIRATORY: No cough, wheezing; No shortness of breath  CARDIOVASCULAR: No chest pain or palpitations  GASTROINTESTINAL: + abdominal  pain. No nausea, vomiting, or hematemesis  GENITOURINARY: No dysuria, frequency or hematuria  NEUROLOGICAL: No stroke like symptoms  SKIN: No rashes    	  MEDICATIONS:  metoprolol succinate  milliGRAM(s) Oral daily        PHYSICAL EXAM:  T(C): 36.9 (01-26-23 @ 13:09), Max: 37.2 (01-25-23 @ 16:31)  HR: 82 (01-26-23 @ 13:09) (75 - 145)  BP: 117/75 (01-26-23 @ 13:09) (85/59 - 117/75)  RR: 18 (01-26-23 @ 13:09) (18 - 18)  SpO2: 95% (01-26-23 @ 13:09) (94% - 97%)  Wt(kg): --  I&O's Summary    25 Jan 2023 07:01  -  26 Jan 2023 07:00  --------------------------------------------------------  IN: 0 mL / OUT: 1000 mL / NET: -1000 mL    26 Jan 2023 07:01  -  26 Jan 2023 15:01  --------------------------------------------------------  IN: 560 mL / OUT: 150 mL / NET: 410 mL          Appearance: In no distress	  HEENT:    PERRL, EOMI	  Cardiovascular:  S1 S2, No JVD  Respiratory: Lungs clear to auscultation	  Gastrointestinal:  Soft, Non-tender, + BS	  Vascularature:  No edema of LE  Psychiatric: Appropriate affect   Neuro: no acute focal deficits                               10.4   13.71 )-----------( 271      ( 26 Jan 2023 14:13 )             33.5     01-26    133<L>  |  98  |  53<H>  ----------------------------<  222<H>  3.5   |  20<L>  |  3.79<H>    Ca    8.8      26 Jan 2023 14:13  Phos  4.0     01-26  Mg     1.9     01-26    TPro  6.5  /  Alb  2.7<L>  /  TBili  0.3  /  DBili  x   /  AST  11  /  ALT  10  /  AlkPhos  117  01-26        Labs personally reviewed      ASSESSMENT/PLAN: 	    80 year old woman with CAD s/p CABG in 2020, prior DVT on Eliquis, HTN, and compensated diastolic CHF presents with acute cholecystitis.       #Post-operative risk evaluation-  S/p ERCP with CBD stent. Tolerate procedure well.   Ms. Batista displays no signs or symptoms of acute coronary ischemia or decompensated CHF.  Admission EKG is sinus with pre-existing anteroseptal q-waves.  Recent echo noted- normal LV systolic function with no hemodynamically significant valvular disease.  IR reconsulted for drainage planned for today, 1/25.   Pt may proceed from a cardiac standpoint.     #Compensated diastolic CHF-  Resume PO Diuretics following procedure.    #CAD s/p CABG-  No signs of active ischemic.  c/w ASA and statin  - 1/26 with c/o epigastric burning a/b episodes of emesis. ECG performed, no ischemic changes noted.     #Sepsis   - 2/2 acute cholecystitis   - S/p CBD stent  - s/p IR drain 1/25        Delaney Vasquez, FABIOLA-NP   Sergey Winchester DO [facility]  Cardiovascular Medicine  [address], [address]  Available through call or text on [employer] TEAM[employer]  Office: [phone]   DATE OF SERVICE: 01-26-23 @ 15:01    Patient is a 82y old  Female who presents with a chief complaint of acute cholecystitis, DKA (26 Jan 2023 09:40)      INTERVAL HISTORY: c/o abdominal and epigastic burning.     REVIEW OF SYSTEMS:  CONSTITUTIONAL: No weakness  EYES/ENT: No visual changes;  No throat pain   NECK: No pain or stiffness  RESPIRATORY: No cough, wheezing; No shortness of breath  CARDIOVASCULAR: No chest pain or palpitations  GASTROINTESTINAL: + abdominal  pain. No nausea, vomiting, or hematemesis  GENITOURINARY: No dysuria, frequency or hematuria  NEUROLOGICAL: No stroke like symptoms  SKIN: No rashes    	  MEDICATIONS:  metoprolol succinate  milliGRAM(s) Oral daily        PHYSICAL EXAM:  T(C): 36.9 (01-26-23 @ 13:09), Max: 37.2 (01-25-23 @ 16:31)  HR: 82 (01-26-23 @ 13:09) (75 - 145)  BP: 117/75 (01-26-23 @ 13:09) (85/59 - 117/75)  RR: 18 (01-26-23 @ 13:09) (18 - 18)  SpO2: 95% (01-26-23 @ 13:09) (94% - 97%)  Wt(kg): --  I&O's Summary    25 Jan 2023 07:01  -  26 Jan 2023 07:00  --------------------------------------------------------  IN: 0 mL / OUT: 1000 mL / NET: -1000 mL    26 Jan 2023 07:01  -  26 Jan 2023 15:01  --------------------------------------------------------  IN: 560 mL / OUT: 150 mL / NET: 410 mL          Appearance: In no distress	  HEENT:    PERRL, EOMI	  Cardiovascular:  S1 S2, No JVD  Respiratory: Lungs clear to auscultation	  Gastrointestinal:  Soft, Non-tender, + BS	  Vascularature:  No edema of LE  Psychiatric: Appropriate affect   Neuro: no acute focal deficits                               10.4   13.71 )-----------( 271      ( 26 Jan 2023 14:13 )             33.5     01-26    133<L>  |  98  |  53<H>  ----------------------------<  222<H>  3.5   |  20<L>  |  3.79<H>    Ca    8.8      26 Jan 2023 14:13  Phos  4.0     01-26  Mg     1.9     01-26    TPro  6.5  /  Alb  2.7<L>  /  TBili  0.3  /  DBili  x   /  AST  11  /  ALT  10  /  AlkPhos  117  01-26        Labs personally reviewed      ASSESSMENT/PLAN: 	    80 year old woman with CAD s/p CABG in 2020, prior DVT on Eliquis, HTN, and compensated diastolic CHF presents with acute cholecystitis.       #Post-operative risk evaluation-  S/p ERCP with CBD stent. Tolerate procedure well.   Ms. Batista displays no signs or symptoms of acute coronary ischemia or decompensated CHF.  Admission EKG is sinus with pre-existing anteroseptal q-waves.  Recent echo noted- normal LV systolic function with no hemodynamically significant valvular disease.  IR reconsulted for drainage planned for today, 1/25.   Pt may proceed from a cardiac standpoint.     #Compensated diastolic CHF-  Resume PO Diuretics following procedure.    #CAD s/p CABG-  No signs of active ischemic.  c/w ASA and statin  - 1/26 with c/o epigastric burning a/b episodes of emesis. ECG performed, no ischemic changes noted.     #Sepsis   - 2/2 acute cholecystitis   - S/p CBD stent  - s/p IR drain 1/25    #SVT  - 1/26 patent HR in 130s while sleeping. Patient missed am dose of Metoprolol 150mg PO.  - Will DC fluids.   - Will give Lopressor 5mg IVP x1 and assess closely.     Delaney Vasquez, FABIOLA-NP   Sergey Winchester DO [facility]  Cardiovascular Medicine  [address], [address]  Available through call or text on [employer] TE[employer]  Office: [phone]   DATE OF SERVICE: 01-26-23 @ 15:01    Patient is a 82y old  Female who presents with a chief complaint of acute cholecystitis, DKA (26 Jan 2023 09:40)      INTERVAL HISTORY: c/o abdominal and epigastic burning.     REVIEW OF SYSTEMS:  CONSTITUTIONAL: No weakness  EYES/ENT: No visual changes;  No throat pain   NECK: No pain or stiffness  RESPIRATORY: No cough, wheezing; No shortness of breath  CARDIOVASCULAR: No chest pain or palpitations  GASTROINTESTINAL: + abdominal  pain. No nausea, vomiting, or hematemesis  GENITOURINARY: No dysuria, frequency or hematuria  NEUROLOGICAL: No stroke like symptoms  SKIN: No rashes    	  MEDICATIONS:  metoprolol succinate  milliGRAM(s) Oral daily        PHYSICAL EXAM:  T(C): 36.9 (01-26-23 @ 13:09), Max: 37.2 (01-25-23 @ 16:31)  HR: 82 (01-26-23 @ 13:09) (75 - 145)  BP: 117/75 (01-26-23 @ 13:09) (85/59 - 117/75)  RR: 18 (01-26-23 @ 13:09) (18 - 18)  SpO2: 95% (01-26-23 @ 13:09) (94% - 97%)  Wt(kg): --  I&O's Summary    25 Jan 2023 07:01  -  26 Jan 2023 07:00  --------------------------------------------------------  IN: 0 mL / OUT: 1000 mL / NET: -1000 mL    26 Jan 2023 07:01  -  26 Jan 2023 15:01  --------------------------------------------------------  IN: 560 mL / OUT: 150 mL / NET: 410 mL          Appearance: In no distress	  HEENT:    PERRL, EOMI	  Cardiovascular:  S1 S2, No JVD  Respiratory: Lungs clear to auscultation	  Gastrointestinal:  Soft, Non-tender, + BS	  Vascularature:  No edema of LE  Psychiatric: Appropriate affect   Neuro: no acute focal deficits                               10.4   13.71 )-----------( 271      ( 26 Jan 2023 14:13 )             33.5     01-26    133<L>  |  98  |  53<H>  ----------------------------<  222<H>  3.5   |  20<L>  |  3.79<H>    Ca    8.8      26 Jan 2023 14:13  Phos  4.0     01-26  Mg     1.9     01-26    TPro  6.5  /  Alb  2.7<L>  /  TBili  0.3  /  DBili  x   /  AST  11  /  ALT  10  /  AlkPhos  117  01-26        Labs personally reviewed      ASSESSMENT/PLAN: 	    80 year old woman with CAD s/p CABG in 2020, prior DVT on Eliquis, HTN, and compensated diastolic CHF presents with acute cholecystitis.       #Post-operative risk evaluation-  S/p ERCP with CBD stent. Tolerate procedure well.   Ms. Batista displays no signs or symptoms of acute coronary ischemia or decompensated CHF.  Admission EKG is sinus with pre-existing anteroseptal q-waves.  Recent echo noted- normal LV systolic function with no hemodynamically significant valvular disease.  IR reconsulted for drainage planned for today, 1/25.   Pt may proceed from a cardiac standpoint.     #Compensated diastolic CHF-  Resume PO Diuretics following procedure.    #CAD s/p CABG-  No signs of active ischemic.  c/w ASA and statin  - 1/26 with c/o epigastric burning a/b episodes of emesis. ECG performed, no ischemic changes noted.     #Sepsis   - 2/2 acute cholecystitis   - S/p CBD stent  - s/p IR drain 1/25    #SVT  - 1/26 patent HR in 130s while sleeping. Patient missed am dose of Metoprolol 150mg PO.  - Resume Metoprolol  - Adenosine 6mg IV x1 with termination of SVT        FABIOLA Smith-SEE Winchester,  [facility]  Cardiovascular Medicine  [address], [address]  Available through call or text on [employer] TE[employer]  Office: [phone]

## 2023-01-26 NOTE — PROGRESS NOTE ADULT - PROBLEM SELECTOR PLAN 6
Diet: regular, CC  DVT PPx: heparin subq (held in case of IR procedure)  Dispo: pending clinical improvement

## 2023-01-26 NOTE — PROGRESS NOTE ADULT - SUBJECTIVE AND OBJECTIVE BOX
Interventional Radiology Follow-Up Note    This is a 82y Female s/p percutaneous cholecystostomy tube placement on 1/25 in Interventional Radiology with Dr. Conway.     S: Patient seen and examined @ bedside. No complaints offered.     Medication:     heparin   Injectable: (01-26)  metoprolol succinate ER: (01-25)  piperacillin/tazobactam IVPB..: (01-26)  piperacillin/tazobactam IVPB..: (01-24)    Vitals:   T(F): 98.4, Max: 98.9 (16:31)  HR: 83  BP: 94/60  RR: 18  SpO2: 94%    Physical Exam:  General: Nontoxic, in NAD  Abdomen: soft, NTND, no peritoneal signs.  Drain Device: Drain intact attached to bag with bilious output. Dressing clean, dry, intact. Drain flushed with 5cc NS w/o difficulty, +fluid return noted in tubing, no pericatheter leakage noted.     LABS:    Culture - Body Fluid with Gram Stain (01.25.23 @ 15:34)    Gram Stain: No polymorphonuclear cells seen per low power field. Few Gram Positive Rods per oil power field    Specimen Source: Abdominal Fl Abdominal Fluid        24hr Drain output: 800cc    Assessment/Plan:  82y Female with pmh of HTN, HLD, T2DM, HFpEF (EF 65% X/2022), breast cancer s/p R partial mastectomy, rectal carcinoma s/p resection, CAD s/p CABG who presents for 1 week of intermittent abdominal pain associated with nausea and vomiting, found to have acute cholecystitis with choledocholithiasis as well as mild DKA. Patient is now s/p ERCP with large CBD stones on fluoro and relatively small sized ampulla; +small periampullary diverticula; s/p sphincterotomy with sphictertome + plastic CBD stent placement with successful drainage of bile/sludge. Balloon extraction not done due to small sized ampulla relative to large CBD stones. GI recommendation to come back in 2-3 months for stone and stent removal. IR consulted for possible cholecystostomy tube placement initially deferred given patient improvement post ERCP. Patient began to develop abdominal pain and leukocytosis. MRI showing multiloculated collection at GB neck. Patient is now s/p percutaneous cholecystostomy tube placement on 1/25 in Interventional Radiology with Dr. Conway.     -please document output as cholecystostomy tube NOT nephrostomy tube (made covering RN aware).   -Ensure adequate hydration.   -trend vs/labs, AM labs pending  -flush drain with 5cc NS daily forward only; DO NOT aspirate  -change dressing q3 days or when dressing is saturated  -regarding outpatient follow up with IR, if the patient is d/c home with drainage catheter they can make an appointment with IR by calling the IR booking office at [phone]; recommend IR follow in 4-6wks for tube evaluation.  -Outpatient surgical evaluation for candidacy of cholecystectomy.   -they will benefit from VNS service to help with drainage catheter care; they should continue same drainage catheter care as an outpatient.  -continue global management per primary team   -Greater than 50% of the encounter was spent counseling and/ or coordination of care on drain care and follow up.   -Please call IR at extension [phone] with any questions, concerns, or issues regarding above.      Latosha Christian PA-C  Available on teams

## 2023-01-26 NOTE — RAPID RESPONSE TEAM SUMMARY - [id number]_GEN_ALL_CORE
Upon arrival VS Hrt rate 140 SVT NIBP 102/ 60 spo2 96% reported a febrile   Primary team at bedside who states the patient missed AM BB dose due to SBP of 90. EKG without any ST changes. Primary team has discussed with cardiology, recommendations were made for Adenosine 6mg x 1. All blood work was drawn approximately 2 hours prior to RRT per primary team no electrolyte derangement. Post administration of adenosine hrt rate returned to SR - SB high 50's - 60's. . RRT was ended.

## 2023-01-26 NOTE — PROGRESS NOTE ADULT - PROBLEM SELECTOR PLAN 3
Patient presenting with mild DKA given BHB of 2 and serum glucose of 450  - resolved, or likely was due to starvation ketosis given AG closed, pH normal, HCO3 normal, glucose improving  - adequate response to 4U regular insulin  - patient on oral agents: repaglinide and semaglutide  - upon last admission 8/2022, patient was well controlled on lantus 16U QHS and admelog 7U TID  - A1c stable at 9.6    Plan:  > continue with lantus 10U QHS  > moderate dose correctional sliding scale with meals while eating  >monitor FS and add sliding scale as needed

## 2023-01-26 NOTE — PROVIDER CONTACT NOTE (OTHER) - ACTION/TREATMENT ORDERED:
As per Michelet Bertrand MD, will come up to asess pt. No further interventions were ordered at this time.

## 2023-01-26 NOTE — PROGRESS NOTE ADULT - PROBLEM SELECTOR PLAN 1
Patient presenting with 1 week of abdominal pain, nausea, and vomiting  - Initial CT A/P: acute cholecystitis with 1.2 cm common bile duct dilatation  - per surgery, not a surgical candidate given comorbidities   - s/p ERCP 1/20 : choledocholithiasis w/ large stone w/ sphincterotomy and biliary stent placement.   - patient now with recurrent abdominal pain, and leukocytosis, repeat CT A/P and MRI non con concerning for multiloculated fluid collection around the neck of the gallbladder, possible perf    Plan:  > s/p IR placement of perc rissa  > continue with zosyn 7 days after source control (1/25 - )   > pain/nausea control with tylenol and maalox  > lipase WNL; minimal c/f post ERCP pancreatitis  > AVOID zofran given QTc 518  > trend CMP, coags, otherwise stable  > Patient will need repeat ERCP in 2-3 months for stent removal PAST SURGICAL HISTORY:  Personal history of spine surgery Patient presenting with 1 week of abdominal pain, nausea, and vomiting  - Initial CT A/P: acute cholecystitis with 1.2 cm common bile duct dilatation  - per surgery, not a surgical candidate given comorbidities   - s/p ERCP 1/20 : choledocholithiasis w/ large stone w/ sphincterotomy and biliary stent placement.   - patient now with recurrent abdominal pain, and leukocytosis, repeat CT A/P and MRI non con concerning for multiloculated fluid collection around the neck of the gallbladder, possible perf    Plan:  > s/p IR placement of perc rissa  > initial bilious fluid cultures growing gram positive rods, f/u speciation  > continue with zosyn 7 days after source control (1/25 - )   > pain/nausea control with tylenol and maalox  > lipase WNL; minimal c/f post ERCP pancreatitis  > AVOID zofran given QTc 518  > trend CMP, coags, otherwise stable  > Patient will need repeat ERCP in 2-3 months for stent removal

## 2023-01-26 NOTE — PROGRESS NOTE ADULT - PROBLEM SELECTOR PLAN 2
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

## 2023-01-26 NOTE — PROGRESS NOTE ADULT - ATTENDING COMMENTS
82F with h/o HTN, T2DM, HFpEF (EF 65%, normal LV in 2022), breast cancer s/p R partial mastectomy, rectal carcinoma s/p resection, CAD s/p CABG who presents for 1 week of intermittent abdominal pain associated with nausea and vomiting.    1. Acute cholecystitis with choledocholithiasis   2. Uncontrolled T2DM with hyperglycemia  3. TAO on CKD 3, pre-renal  4. Chronic hydronephrosis  5. HTN  6. h/o CABG    Had some hypotension and pain overnight but eating breakfast this AM without difficulty, BP improved after bolus.     - s/p ERCP by GI on 1/20:  large CBD stones on fluoro and relatively small sized ampulla; +small periampullary diverticula; s/p sphincterotomy + plastic CBD stent placement with successful drainage of bile/sludge. patient will need  repeat ERCP in 2-3 months.  - Abd pain after procedure w/ fever - CT suggestive of fluid collection and MRI with ultiloculated fluid collection adjacent to the gallbladder   - s/p perc rissa 1/25. f/u drain culture   - Leukocytosis improving today and cr also improving after IVF  - Continue to monitor CBC and BMP   - Continue Zosyn for now - anticipate 7-10 day course from source control     Rest as above.

## 2023-01-26 NOTE — PROGRESS NOTE ADULT - ASSESSMENT
seen and examined at  the bedside ,   having regurgitation   chest discomfort       acetaminophen     Tablet .. 650 milliGRAM(s) Oral every 6 hours PRN  aluminum hydroxide/magnesium hydroxide/simethicone Suspension 30 milliLiter(s) Oral every 6 hours PRN  aspirin  chewable 81 milliGRAM(s) Oral daily  atorvastatin 40 milliGRAM(s) Oral at bedtime  chlorhexidine 4% Liquid 1 Application(s) Topical <User Schedule>  dextrose 5%. 1000 milliLiter(s) IV Continuous <Continuous>  dextrose 5%. 1000 milliLiter(s) IV Continuous <Continuous>  dextrose 50% Injectable 25 Gram(s) IV Push once  dextrose 50% Injectable 12.5 Gram(s) IV Push once  dextrose 50% Injectable 25 Gram(s) IV Push once  dextrose Oral Gel 15 Gram(s) Oral once PRN  gabapentin 200 milliGRAM(s) Oral three times a day  glucagon  Injectable 1 milliGRAM(s) IntraMuscular once  heparin   Injectable 5000 Unit(s) SubCutaneous every 8 hours  indomethacin Suppository 100 milliGRAM(s) Rectal once  insulin glargine Injectable (LANTUS) 10 Unit(s) SubCutaneous at bedtime  insulin lispro (ADMELOG) corrective regimen sliding scale   SubCutaneous Before meals and at bedtime  metoprolol succinate  milliGRAM(s) Oral daily  morphine  - Injectable 1 milliGRAM(s) IV Push every 6 hours PRN  piperacillin/tazobactam IVPB.. 3.375 Gram(s) IV Intermittent every 8 hours  sodium chloride 0.9% lock flush 10 milliLiter(s) IV Push every 1 hour PRN  spironolactone 12.5 milliGRAM(s) Oral daily      VITAL:  T(C): , Max: 37.3 (01-21-23 @ 13:21)  T(F): , Max: 99.1 (01-21-23 @ 13:21)  HR: 88 (01-21-23 @ 13:21)  BP: 100/60 (01-21-23 @ 13:21)  BP(mean): --  RR: 18 (01-21-23 @ 13:21)  SpO2: 96% (01-21-23 @ 13:21)  Wt(kg): --    01-20-23 @ 07:01  -  01-21-23 @ 07:00  --------------------------------------------------------  IN: 440 mL / OUT: 50 mL / NET: 390 mL    01-21-23 @ 07:01  -  01-21-23 @ 14:41  --------------------------------------------------------  IN: 850 mL / OUT: 250 mL / NET: 600 mL        PHYSICAL EXAM:  General: NAD; Alert  HEENT:  NCAT; PERRLA  Neck: No JVD; supple  Respiratory: CTA-b/l  Cardiac: RRR s1s2  Gastrointestinal: BS+, soft, NT/ND , biliary drain in placed   Urologic: No chen  Extremities: No peripheral edema  Access:     LABS:                          11.5   13.38 )-----------( 188      ( 21 Jan 2023 06:59 )             37.6     Na(137)/K(4.0)/Cl(100)/HCO3(26)/BUN(43)/Cr(2.67)Glu(151)/Ca(8.7)/Mg(1.9)/PO4(3.8)    01-21 @ 06:59  Na(138)/K(4.1)/Cl(101)/HCO3(26)/BUN(38)/Cr(2.44)Glu(145)/Ca(9.0)/Mg(1.9)/PO4(3.5)    01-20 @ 06:35  Na(139)/K(4.1)/Cl(100)/HCO3(26)/BUN(26)/Cr(1.62)Glu(203)/Ca(9.4)/Mg(--)/PO4(--)    01-19 @ 06:58  Na(136)/K(4.2)/Cl(98)/HCO3(24)/BUN(31)/Cr(1.58)Glu(323)/Ca(9.4)/Mg(--)/PO4(--)    01-18 @ 16:14      Sodium, Random Urine: 27 mmol/L (01-21 @ 06:50)  Osmolality, Random Urine: 368 mos/kg (01-21 @ 06:50)  Potassium, Random Urine: 53 mmol/L (01-21 @ 06:50)    IMAGES:  CONTRAST/COMPLICATIONS:  IV Contrast: NONE  Oral Contrast: NONE  Complications: None reported at time of study completion    PROCEDURE:  CT of the Abdomen and Pelvis was performed.  Sagittal and coronal reformats were performed.    FINDINGS:  LOWER CHEST: Small bilateral pleural effusions. Cardiomegaly and coronary   artery calcifications. Partially imaged sternotomy.    LIVER: Within normal limits.  BILE DUCTS: Biliary stent in place. No intrahepatic biliary ductal   dilatation. Persistent CBD stones adjacent to the stent.  GALLBLADDER: Cholelithiasis. Ill-defined collection in the region of the   gallbladder neck and common bile duct measuring approximately 4.6 x 3.0   cm also encompassing the secondportion of the duodenum.  SPLEEN: Within normal limits.  PANCREAS: Fatty atrophy.  ADRENALS: Within normal limits.  KIDNEYS/URETERS: Unchanged chronically hydronephrotic right kidney with   right renal atrophy.    BLADDER: Within normal limits.  REPRODUCTIVE ORGANS: Myomatous uterus.    BOWEL: Rectosigmoid and small bowel anastomoses. No bowel obstruction.  PERITONEUM: No ascites.  VESSELS: Atherosclerotic changes.  RETROPERITONEUM/LYMPH NODES: No lymphadenopathy.  ABDOMINAL WALL: Redemonstrated small to moderate fat-containing ventral   hernia.  BONES: Right total hip arthroplasty. Degenerative changes.    IMPRESSION:  New ill-defined collection in the region of the gallbladder neck and   common bile duct also encompassing the second portionof the duodenum.   Findings may be related to bile leak, perforated cholecystitis, or   duodenal hematoma. Recommend further evaluation with MRI.    Interval placement of a stent within the common bile duct with a residual   CBD stones adjacent to the stent.    --- End of Report ---            ASSESSMENT/PLAN  Patient is a 80 year old F with a PMHx of HTN, HLD, T2DM, HFpEF (EF 65% X/2022), breast cancer s/p R partial mastectomy, rectal carcinoma s/p resection, CAD s/p CABG (11/2020) who presents for 1 day of progressively worsening abdominal pain associated with nausea and NBNB vomiting. 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  low nephron mass   non oliguric harleen likely in the view of relative  hypotension   hyponatremia--   CVS----BP  soft   GI:  acute cholecystitis --- ERCP with stent placement  1/20/23  biliary leak ---IR and surgical evaluation     RECOMMENDATION:  1)Renal:  cr improving   non oliguric harleen  monitor resp status   avoid nephrotoxic medication  cont holding diuretics.  daily bmp  urine lytes   2)CVS: hypotensive earlier got NS . Toprol xl  with holding parameter   3) ID: on IV abx  4) GI ---cholecystotomy tube placement  1/25/23         daughter at bedside updated     [facility]  Office: [phone]     seen and examined at  the bedside ,   having regurgitation   chest discomfort       acetaminophen     Tablet .. 650 milliGRAM(s) Oral every 6 hours PRN  aluminum hydroxide/magnesium hydroxide/simethicone Suspension 30 milliLiter(s) Oral every 6 hours PRN  aspirin  chewable 81 milliGRAM(s) Oral daily  atorvastatin 40 milliGRAM(s) Oral at bedtime  chlorhexidine 4% Liquid 1 Application(s) Topical <User Schedule>  dextrose 5%. 1000 milliLiter(s) IV Continuous <Continuous>  dextrose 5%. 1000 milliLiter(s) IV Continuous <Continuous>  dextrose 50% Injectable 25 Gram(s) IV Push once  dextrose 50% Injectable 12.5 Gram(s) IV Push once  dextrose 50% Injectable 25 Gram(s) IV Push once  dextrose Oral Gel 15 Gram(s) Oral once PRN  gabapentin 200 milliGRAM(s) Oral three times a day  glucagon  Injectable 1 milliGRAM(s) IntraMuscular once  heparin   Injectable 5000 Unit(s) SubCutaneous every 8 hours  indomethacin Suppository 100 milliGRAM(s) Rectal once  insulin glargine Injectable (LANTUS) 10 Unit(s) SubCutaneous at bedtime  insulin lispro (ADMELOG) corrective regimen sliding scale   SubCutaneous Before meals and at bedtime  metoprolol succinate  milliGRAM(s) Oral daily  morphine  - Injectable 1 milliGRAM(s) IV Push every 6 hours PRN  piperacillin/tazobactam IVPB.. 3.375 Gram(s) IV Intermittent every 8 hours  sodium chloride 0.9% lock flush 10 milliLiter(s) IV Push every 1 hour PRN  spironolactone 12.5 milliGRAM(s) Oral daily      VITAL:  T(C): , Max: 37.3 (01-21-23 @ 13:21)  T(F): , Max: 99.1 (01-21-23 @ 13:21)  HR: 88 (01-21-23 @ 13:21)  BP: 100/60 (01-21-23 @ 13:21)  BP(mean): --  RR: 18 (01-21-23 @ 13:21)  SpO2: 96% (01-21-23 @ 13:21)  Wt(kg): --    01-20-23 @ 07:01  -  01-21-23 @ 07:00  --------------------------------------------------------  IN: 440 mL / OUT: 50 mL / NET: 390 mL    01-21-23 @ 07:01  -  01-21-23 @ 14:41  --------------------------------------------------------  IN: 850 mL / OUT: 250 mL / NET: 600 mL        PHYSICAL EXAM:  General: NAD; Alert  HEENT:  NCAT; PERRLA  Neck: No JVD; supple  Respiratory: CTA-b/l  Cardiac: RRR s1s2  Gastrointestinal: BS+, soft, NT/ND , biliary drain in placed   Urologic: No chen  Extremities: No peripheral edema  Access:     LABS:                          11.5   13.38 )-----------( 188      ( 21 Jan 2023 06:59 )             37.6     Na(137)/K(4.0)/Cl(100)/HCO3(26)/BUN(43)/Cr(2.67)Glu(151)/Ca(8.7)/Mg(1.9)/PO4(3.8)    01-21 @ 06:59  Na(138)/K(4.1)/Cl(101)/HCO3(26)/BUN(38)/Cr(2.44)Glu(145)/Ca(9.0)/Mg(1.9)/PO4(3.5)    01-20 @ 06:35  Na(139)/K(4.1)/Cl(100)/HCO3(26)/BUN(26)/Cr(1.62)Glu(203)/Ca(9.4)/Mg(--)/PO4(--)    01-19 @ 06:58  Na(136)/K(4.2)/Cl(98)/HCO3(24)/BUN(31)/Cr(1.58)Glu(323)/Ca(9.4)/Mg(--)/PO4(--)    01-18 @ 16:14      Sodium, Random Urine: 27 mmol/L (01-21 @ 06:50)  Osmolality, Random Urine: 368 mos/kg (01-21 @ 06:50)  Potassium, Random Urine: 53 mmol/L (01-21 @ 06:50)    IMAGES:  CONTRAST/COMPLICATIONS:  IV Contrast: NONE  Oral Contrast: NONE  Complications: None reported at time of study completion    PROCEDURE:  CT of the Abdomen and Pelvis was performed.  Sagittal and coronal reformats were performed.    FINDINGS:  LOWER CHEST: Small bilateral pleural effusions. Cardiomegaly and coronary   artery calcifications. Partially imaged sternotomy.    LIVER: Within normal limits.  BILE DUCTS: Biliary stent in place. No intrahepatic biliary ductal   dilatation. Persistent CBD stones adjacent to the stent.  GALLBLADDER: Cholelithiasis. Ill-defined collection in the region of the   gallbladder neck and common bile duct measuring approximately 4.6 x 3.0   cm also encompassing the secondportion of the duodenum.  SPLEEN: Within normal limits.  PANCREAS: Fatty atrophy.  ADRENALS: Within normal limits.  KIDNEYS/URETERS: Unchanged chronically hydronephrotic right kidney with   right renal atrophy.    BLADDER: Within normal limits.  REPRODUCTIVE ORGANS: Myomatous uterus.    BOWEL: Rectosigmoid and small bowel anastomoses. No bowel obstruction.  PERITONEUM: No ascites.  VESSELS: Atherosclerotic changes.  RETROPERITONEUM/LYMPH NODES: No lymphadenopathy.  ABDOMINAL WALL: Redemonstrated small to moderate fat-containing ventral   hernia.  BONES: Right total hip arthroplasty. Degenerative changes.    IMPRESSION:  New ill-defined collection in the region of the gallbladder neck and   common bile duct also encompassing the second portionof the duodenum.   Findings may be related to bile leak, perforated cholecystitis, or   duodenal hematoma. Recommend further evaluation with MRI.    Interval placement of a stent within the common bile duct with a residual   CBD stones adjacent to the stent.    --- End of Report ---            ASSESSMENT/PLAN  Patient is a 80 year old F with a PMHx of HTN, HLD, T2DM, HFpEF (EF 65% X/2022), breast cancer s/p R partial mastectomy, rectal carcinoma s/p resection, CAD s/p CABG (11/2020) who presents for 1 day of progressively worsening abdominal pain associated with nausea and NBNB vomiting. 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  low nephron mass   non oliguric harleen likely in the view of relative  hypotension   hyponatremia--   CVS----BP  soft hypotensive over night  ns   GI:  acute cholecystitis --- ERCP with stent placement  1/20/23  biliary leak ---IR and surgical evaluation     RECOMMENDATION:  1)Renal:  cr improving   give LR 50 cc/l for 6 h   non oliguric harleen  monitor resp status   avoid nephrotoxic medication  cont holding diuretics.  daily bmp  urine lytes   2)CVS:  BP soft  . Toprol xl  with holding parameter   3) ID: on IV abx  4) GI ---cholecystotomy tube placement  1/25/23       d-w ACP   daughter at bedside updated     [facility]  Office: [phone]

## 2023-01-26 NOTE — PROGRESS NOTE ADULT - SUBJECTIVE AND OBJECTIVE BOX
SUBJECTIVE:   Seen and examined at bedside.     OBJECTIVE: T(C): 36.9 (23 @ 06:08), Max: 37.2 (23 @ 16:31)  HR: 83 (23 @ 06:08) (70 - 145)  BP: 94/60 (23 @ 06:08) (85/59 - 120/67)  RR: 18 (23 @ 06:08) (15 - 18)  SpO2: 94% (23 @ 06:08) (94% - 99%)  Wt(kg): --  I&O's Summary    2023 07:  -  2023 07:00  --------------------------------------------------------  IN: 0 mL / OUT: 1000 mL / NET: -1000 mL      I&O's Detail    2023 07:  -  2023 07:00  --------------------------------------------------------  IN:  Total IN: 0 mL    OUT:    Nephrostomy Tube (mL): 800 mL    Voided (mL): 200 mL  Total OUT: 1000 mL    Total NET: -1000 mL      Physical Exam  General: NAD  REsp: nonlabored   Abdomen: soft, nontender, nondistended. perc rissa tube with bile   Vasc: WWP    MEDICATIONS  (STANDING):  aspirin  chewable 81 milliGRAM(s) Oral daily  atorvastatin 40 milliGRAM(s) Oral at bedtime  chlorhexidine 4% Liquid 1 Application(s) Topical <User Schedule>  dextrose 5%. 1000 milliLiter(s) (100 mL/Hr) IV Continuous <Continuous>  dextrose 5%. 1000 milliLiter(s) (50 mL/Hr) IV Continuous <Continuous>  dextrose 50% Injectable 25 Gram(s) IV Push once  dextrose 50% Injectable 12.5 Gram(s) IV Push once  dextrose 50% Injectable 25 Gram(s) IV Push once  gabapentin 200 milliGRAM(s) Oral three times a day  glucagon  Injectable 1 milliGRAM(s) IntraMuscular once  heparin   Injectable 5000 Unit(s) SubCutaneous every 8 hours  indomethacin Suppository 100 milliGRAM(s) Rectal once  insulin glargine Injectable (LANTUS) 10 Unit(s) SubCutaneous at bedtime  insulin lispro (ADMELOG) corrective regimen sliding scale   SubCutaneous three times a day before meals  lactated ringers. 1000 milliLiter(s) (75 mL/Hr) IV Continuous <Continuous>  metoprolol succinate  milliGRAM(s) Oral daily  piperacillin/tazobactam IVPB.. 3.375 Gram(s) IV Intermittent every 12 hours    MEDICATIONS  (PRN):  acetaminophen     Tablet .. 650 milliGRAM(s) Oral every 6 hours PRN Temp greater or equal to 38C (100.4F), Mild Pain (1 - 3), Moderate Pain (4 - 6)  albuterol/ipratropium for Nebulization 3 milliLiter(s) Nebulizer every 6 hours PRN Shortness of Breath and/or Wheezing  aluminum hydroxide/magnesium hydroxide/simethicone Suspension 30 milliLiter(s) Oral every 6 hours PRN Dyspepsia  dextrose Oral Gel 15 Gram(s) Oral once PRN Blood Glucose LESS THAN 70 milliGRAM(s)/deciliter  fentaNYL    Injectable 25 MICROGram(s) IV Push every 5 minutes PRN Moderate Pain (4 - 6)  sodium chloride 0.9% lock flush 10 milliLiter(s) IV Push every 1 hour PRN Pre/post blood products, medications, blood draw, and to maintain line patency      LABS:                        10.1   15. )-----------( 226      ( 2023 07:17 )             32.8         136  |  98  |  51<H>  ----------------------------<  112<H>  3.9   |  21<L>  |  4.09<H>    Ca    9.1      2023 07:17  Phos  4.9       Mg     2.1         TPro  6.0  /  Alb  2.5<L>  /  TBili  0.3  /  DBili  x   /  AST  13  /  ALT  12  /  AlkPhos  118      PT/INR - ( 2023 07:17 )   PT: 12.1 sec;   INR: 1.05 ratio         PTT - ( 2023 07:17 )  PTT:29.9 sec  Urinalysis Basic - ( 2023 12:41 )    Color: Yellow / Appearance: Slightly Turbid / S.016 / pH: x  Gluc: x / Ketone: Trace  / Bili: Negative / Urobili: Negative   Blood: x / Protein: 30 mg/dL / Nitrite: Negative   Leuk Esterase: Small / RBC: 87 /hpf / WBC 13 /HPF   Sq Epi: x / Non Sq Epi: 3 /hpf / Bacteria: Negative

## 2023-01-26 NOTE — RAPID RESPONSE TEAM SUMMARY - NSOTHERINTERVENTIONSRRT_GEN_ALL_CORE
Primary team to consider 75 mg or 50 mg of metoprolol BID with holding parameters.   (+) TAO on CKD would monitor strict I/O  if increase in urine output would consider supplementation of Potassium at least 10meq for goal potassium of at least 4 and Mag of 2.   Would also trend CE at least x 2 patient r/o Post procedure NSTEMI.    Also consider TSH and GIAN  if not done Please place on telemetry at least 24 hrs

## 2023-01-26 NOTE — PROGRESS NOTE ADULT - SUBJECTIVE AND OBJECTIVE BOX
PROGRESS NOTE:     Patient is a 82y old  Female who presents with a chief complaint of acute cholecystitis, DKA (2023 15:29)      ---------------------------------------------------  Dmitry Dangelo  PGY-1, Internal Medicine  Available on [employer] Teams  Pager: [phone] ([facility]), or [phone] ([facility])  ---------------------------------------------------    SUBJECTIVE / OVERNIGHT EVENTS:    No acute events overnight. Patient examined at bedside with no acute complaints.     Pain:  Bowel Movements:  Urination:  OOB:  PT:    REVIEW OF SYSTEMS:    CONSTITUTIONAL: No weakness, fevers or chills  EYES/ENT: No visual changes;  No vertigo or throat pain   NECK: No pain or stiffness  RESPIRATORY: No cough, wheezing, hemoptysis; No shortness of breath  CARDIOVASCULAR: No chest pain or palpitations  GASTROINTESTINAL: No abdominal or epigastric pain. No nausea, vomiting, or hematemesis; No diarrhea or constipation. No melena or hematochezia.  GENITOURINARY: No dysuria, frequency or hematuria  NEUROLOGICAL: No numbness or weakness  SKIN: No itching, rashes      MEDICATIONS  (STANDING):  aspirin  chewable 81 milliGRAM(s) Oral daily  atorvastatin 40 milliGRAM(s) Oral at bedtime  chlorhexidine 4% Liquid 1 Application(s) Topical <User Schedule>  dextrose 5%. 1000 milliLiter(s) (100 mL/Hr) IV Continuous <Continuous>  dextrose 5%. 1000 milliLiter(s) (50 mL/Hr) IV Continuous <Continuous>  dextrose 50% Injectable 25 Gram(s) IV Push once  dextrose 50% Injectable 12.5 Gram(s) IV Push once  dextrose 50% Injectable 25 Gram(s) IV Push once  gabapentin 200 milliGRAM(s) Oral three times a day  glucagon  Injectable 1 milliGRAM(s) IntraMuscular once  heparin   Injectable 5000 Unit(s) SubCutaneous every 8 hours  indomethacin Suppository 100 milliGRAM(s) Rectal once  insulin glargine Injectable (LANTUS) 10 Unit(s) SubCutaneous at bedtime  insulin lispro (ADMELOG) corrective regimen sliding scale   SubCutaneous three times a day before meals  lactated ringers. 1000 milliLiter(s) (75 mL/Hr) IV Continuous <Continuous>  metoprolol succinate  milliGRAM(s) Oral daily  piperacillin/tazobactam IVPB.. 3.375 Gram(s) IV Intermittent every 12 hours    MEDICATIONS  (PRN):  acetaminophen     Tablet .. 650 milliGRAM(s) Oral every 6 hours PRN Temp greater or equal to 38C (100.4F), Mild Pain (1 - 3), Moderate Pain (4 - 6)  albuterol/ipratropium for Nebulization 3 milliLiter(s) Nebulizer every 6 hours PRN Shortness of Breath and/or Wheezing  aluminum hydroxide/magnesium hydroxide/simethicone Suspension 30 milliLiter(s) Oral every 6 hours PRN Dyspepsia  dextrose Oral Gel 15 Gram(s) Oral once PRN Blood Glucose LESS THAN 70 milliGRAM(s)/deciliter  fentaNYL    Injectable 25 MICROGram(s) IV Push every 5 minutes PRN Moderate Pain (4 - 6)  sodium chloride 0.9% lock flush 10 milliLiter(s) IV Push every 1 hour PRN Pre/post blood products, medications, blood draw, and to maintain line patency      CAPILLARY BLOOD GLUCOSE      POCT Blood Glucose.: 188 mg/dL (2023 21:57)  POCT Blood Glucose.: 180 mg/dL (2023 18:48)  POCT Blood Glucose.: 110 mg/dL (2023 11:49)  POCT Blood Glucose.: 114 mg/dL (2023 07:56)    I&O's Summary    2023 07:01  -  2023 07:00  --------------------------------------------------------  IN: 0 mL / OUT: 1000 mL / NET: -1000 mL        VITALS:   T(C): 36.9 (23 @ 06:08), Max: 37.2 (23 @ 16:31)  HR: 83 (23 @ 06:08) (70 - 145)  BP: 94/60 (23 @ 06:08) (85/59 - 120/67)  RR: 18 (23 @ 06:08) (15 - 18)  SpO2: 94% (23 @ 06:08) (94% - 99%)    GENERAL: NAD, lying in bed comfortably  HEAD:  Atraumatic, normocephalic  EYES: EOMI, PERRLA, conjunctiva and sclera clear  ENT: Moist mucous membranes  NECK: Supple, no JVD  HEART: Regular rate and rhythm, no murmurs, rubs, or gallops  LUNGS: Unlabored respirations.  Clear to auscultation bilaterally, no crackles, wheezing, or rhonchi  ABDOMEN: Soft, nontender, nondistended, +BS  EXTREMITIES: 2+ peripheral pulses bilaterally. No clubbing, cyanosis, or edema  NERVOUS SYSTEM:  A&Ox3, no focal deficits   SKIN: No rashes or lesions    LABS:                        10.1   15.01 )-----------( 226      ( 2023 07:17 )             32.8         136  |  98  |  51<H>  ----------------------------<  112<H>  3.9   |  21<L>  |  4.09<H>    Ca    9.1      2023 07:17  Phos  4.9       Mg     2.1         TPro  6.0  /  Alb  2.5<L>  /  TBili  0.3  /  DBili  x   /  AST  13  /  ALT  12  /  AlkPhos  118      PT/INR - ( 2023 07:17 )   PT: 12.1 sec;   INR: 1.05 ratio         PTT - ( 2023 07:17 )  PTT:29.9 sec      Urinalysis Basic - ( 2023 12:41 )    Color: Yellow / Appearance: Slightly Turbid / S.016 / pH: x  Gluc: x / Ketone: Trace  / Bili: Negative / Urobili: Negative   Blood: x / Protein: 30 mg/dL / Nitrite: Negative   Leuk Esterase: Small / RBC: 87 /hpf / WBC 13 /HPF   Sq Epi: x / Non Sq Epi: 3 /hpf / Bacteria: Negative        Culture - Body Fluid with Gram Stain (collected 2023 15:34)  Source: Abdominal Fl Abdominal Fluid  Gram Stain (2023 23:26):    No polymorphonuclear cells seen per low power field    Few Gram Positive Rods per oil power field        RADIOLOGY & ADDITIONAL TESTS:  Results Reviewed:   Imaging Personally Reviewed:  Electrocardiogram Personally Reviewed:    COORDINATION OF CARE:  Care Discussed with Consultants/Other Providers [Y/N]:  Prior or Outpatient Records Reviewed [Y/N]:     PROGRESS NOTE:     Patient is a 82y old  Female who presents with a chief complaint of acute cholecystitis, DKA (2023 15:29)      ---------------------------------------------------  Dmitry Dangelo  PGY-1, Internal Medicine  Available on [employer] Teams  Pager: [phone] ([facility]), or [phone] ([facility])  ---------------------------------------------------    SUBJECTIVE / OVERNIGHT EVENTS:    Patient underwent placement of percutaneous cholecystostomy yesterday with IR, tolerated procedure well. Overnight, patient had episode of hypotension to 85/59, given 500cc bolus of NS with improvement to 90s/60s. Patient comfortably asleep and mentating appropriately at the time. Patient examined at bedside, reports that she continues to have some diffuse abdominal pain, though it is improved from yesterday.        MEDICATIONS  (STANDING):  aspirin  chewable 81 milliGRAM(s) Oral daily  atorvastatin 40 milliGRAM(s) Oral at bedtime  chlorhexidine 4% Liquid 1 Application(s) Topical <User Schedule>  dextrose 5%. 1000 milliLiter(s) (100 mL/Hr) IV Continuous <Continuous>  dextrose 5%. 1000 milliLiter(s) (50 mL/Hr) IV Continuous <Continuous>  dextrose 50% Injectable 25 Gram(s) IV Push once  dextrose 50% Injectable 12.5 Gram(s) IV Push once  dextrose 50% Injectable 25 Gram(s) IV Push once  gabapentin 200 milliGRAM(s) Oral three times a day  glucagon  Injectable 1 milliGRAM(s) IntraMuscular once  heparin   Injectable 5000 Unit(s) SubCutaneous every 8 hours  indomethacin Suppository 100 milliGRAM(s) Rectal once  insulin glargine Injectable (LANTUS) 10 Unit(s) SubCutaneous at bedtime  insulin lispro (ADMELOG) corrective regimen sliding scale   SubCutaneous three times a day before meals  lactated ringers. 1000 milliLiter(s) (75 mL/Hr) IV Continuous <Continuous>  metoprolol succinate  milliGRAM(s) Oral daily  piperacillin/tazobactam IVPB.. 3.375 Gram(s) IV Intermittent every 12 hours    MEDICATIONS  (PRN):  acetaminophen     Tablet .. 650 milliGRAM(s) Oral every 6 hours PRN Temp greater or equal to 38C (100.4F), Mild Pain (1 - 3), Moderate Pain (4 - 6)  albuterol/ipratropium for Nebulization 3 milliLiter(s) Nebulizer every 6 hours PRN Shortness of Breath and/or Wheezing  aluminum hydroxide/magnesium hydroxide/simethicone Suspension 30 milliLiter(s) Oral every 6 hours PRN Dyspepsia  dextrose Oral Gel 15 Gram(s) Oral once PRN Blood Glucose LESS THAN 70 milliGRAM(s)/deciliter  fentaNYL    Injectable 25 MICROGram(s) IV Push every 5 minutes PRN Moderate Pain (4 - 6)  sodium chloride 0.9% lock flush 10 milliLiter(s) IV Push every 1 hour PRN Pre/post blood products, medications, blood draw, and to maintain line patency      CAPILLARY BLOOD GLUCOSE      POCT Blood Glucose.: 188 mg/dL (2023 21:57)  POCT Blood Glucose.: 180 mg/dL (2023 18:48)  POCT Blood Glucose.: 110 mg/dL (2023 11:49)  POCT Blood Glucose.: 114 mg/dL (2023 07:56)    I&O's Summary    2023 07:01  -  2023 07:00  --------------------------------------------------------  IN: 0 mL / OUT: 1000 mL / NET: -1000 mL        VITALS:   T(C): 36.9 (23 @ 06:08), Max: 37.2 (23 @ 16:31)  HR: 83 (23 @ 06:08) (70 - 145)  BP: 94/60 (23 @ 06:08) (85/59 - 120/67)  RR: 18 (23 @ 06:08) (15 - 18)  SpO2: 94% (23 @ 06:08) (94% - 99%)      GENERAL: Older woman in NAD, lying in bed comfortably  ENT: Moist mucous membranes  NECK: Supple   CHEST/LUNG: CTABL  HEART: Regular rate and rhythm; No murmurs, rubs, or gallops  ABDOMEN: mild RUQ tenderness TTP, cholecystostomy tube with don bilious drainage, tube insertion site c/d/i   EXTREMITIES: +bilateral UE lymphedema R > L, 1+ bilateral LE pitting edema, 2+ peripheral pulses bilaterally  NERVOUS SYSTEM:  Alert & Oriented X3, speech clear. No deficits   MSK: FROM all 4 extremities, full and equal strength      LABS:                        10.1   15.01 )-----------( 226      ( 2023 07:17 )             32.8         136  |  98  |  51<H>  ----------------------------<  112<H>  3.9   |  21<L>  |  4.09<H>    Ca    9.1      2023 07:17  Phos  4.9       Mg     2.1         TPro  6.0  /  Alb  2.5<L>  /  TBili  0.3  /  DBili  x   /  AST  13  /  ALT  12  /  AlkPhos  118      PT/INR - ( 2023 07:17 )   PT: 12.1 sec;   INR: 1.05 ratio         PTT - ( 2023 07:17 )  PTT:29.9 sec      Urinalysis Basic - ( 2023 12:41 )    Color: Yellow / Appearance: Slightly Turbid / S.016 / pH: x  Gluc: x / Ketone: Trace  / Bili: Negative / Urobili: Negative   Blood: x / Protein: 30 mg/dL / Nitrite: Negative   Leuk Esterase: Small / RBC: 87 /hpf / WBC 13 /HPF   Sq Epi: x / Non Sq Epi: 3 /hpf / Bacteria: Negative        Culture - Body Fluid with Gram Stain (collected 2023 15:34)  Source: Abdominal Fl Abdominal Fluid  Gram Stain (2023 23:26):    No polymorphonuclear cells seen per low power field    Few Gram Positive Rods per oil power field

## 2023-01-26 NOTE — PROGRESS NOTE ADULT - ATTENDING COMMENTS
DATE OF SERVICE: 01-26-23     Seen and examined. s/p perc rissa yesterday, bilious output.   tachycardic overnight, resolved with IVF  Abdomen soft, less tender today  WBC 13, cr improved.    Fu cx  Continue abx  drain care  monitor labs/vitals

## 2023-01-26 NOTE — PROGRESS NOTE ADULT - ASSESSMENT
Patient is a 82 year old F with a PMHx of HTN, HLD, T2DM, HFpEF (EF 65% X/2022), breast cancer s/p R partial mastectomy, rectal carcinoma s/p resection, CAD s/p CABG who presents for 1 week of intermittent abdominal pain associated with nausea and vomiting, found to have acute cholecystitis with choledocholithiasis. Patient is now s/p ERCP with CBD placement but found to have new fluid collection adjacent to the gallbladder neck, pending IR consult for possible percutaneous cholecystostomy. Patient is a 82 year old F with a PMHx of HTN, HLD, T2DM, HFpEF (EF 65% X/2022), breast cancer s/p R partial mastectomy, rectal carcinoma s/p resection, CAD s/p CABG who presents for 1 week of intermittent abdominal pain associated with nausea and vomiting, found to have acute cholecystitis with choledocholithiasis. Patient initially underwent ERCP with CBD placement but was found to have a new fluid collection adjacent to the gallbladder neck, now s/p percutaneous cholecystostomy.

## 2023-01-27 DIAGNOSIS — I47.1 SUPRAVENTRICULAR TACHYCARDIA: ICD-10-CM

## 2023-01-27 LAB
ALBUMIN SERPL ELPH-MCNC: 2.6 G/DL — LOW (ref 3.3–5)
ALP SERPL-CCNC: 124 U/L — HIGH (ref 40–120)
ALT FLD-CCNC: 10 U/L — SIGNIFICANT CHANGE UP (ref 10–45)
ANION GAP SERPL CALC-SCNC: 19 MMOL/L — HIGH (ref 5–17)
AST SERPL-CCNC: 19 U/L — SIGNIFICANT CHANGE UP (ref 10–40)
BILIRUB SERPL-MCNC: 0.2 MG/DL — SIGNIFICANT CHANGE UP (ref 0.2–1.2)
BUN SERPL-MCNC: 52 MG/DL — HIGH (ref 7–23)
CALCIUM SERPL-MCNC: 8.6 MG/DL — SIGNIFICANT CHANGE UP (ref 8.4–10.5)
CHLORIDE SERPL-SCNC: 99 MMOL/L — SIGNIFICANT CHANGE UP (ref 96–108)
CO2 SERPL-SCNC: 17 MMOL/L — LOW (ref 22–31)
CREAT SERPL-MCNC: 3.82 MG/DL — HIGH (ref 0.5–1.3)
CULTURE RESULTS: SIGNIFICANT CHANGE UP
CULTURE RESULTS: SIGNIFICANT CHANGE UP
EGFR: 11 ML/MIN/1.73M2 — LOW
GLUCOSE BLDC GLUCOMTR-MCNC: 220 MG/DL — HIGH (ref 70–99)
GLUCOSE BLDC GLUCOMTR-MCNC: 225 MG/DL — HIGH (ref 70–99)
GLUCOSE BLDC GLUCOMTR-MCNC: 253 MG/DL — HIGH (ref 70–99)
GLUCOSE BLDC GLUCOMTR-MCNC: 261 MG/DL — HIGH (ref 70–99)
GLUCOSE BLDC GLUCOMTR-MCNC: 263 MG/DL — HIGH (ref 70–99)
GLUCOSE SERPL-MCNC: 236 MG/DL — HIGH (ref 70–99)
HCT VFR BLD CALC: 35.6 % — SIGNIFICANT CHANGE UP (ref 34.5–45)
HGB BLD-MCNC: 10.9 G/DL — LOW (ref 11.5–15.5)
MAGNESIUM SERPL-MCNC: 2.1 MG/DL — SIGNIFICANT CHANGE UP (ref 1.6–2.6)
MCHC RBC-ENTMCNC: 29.5 PG — SIGNIFICANT CHANGE UP (ref 27–34)
MCHC RBC-ENTMCNC: 30.6 GM/DL — LOW (ref 32–36)
MCV RBC AUTO: 96.5 FL — SIGNIFICANT CHANGE UP (ref 80–100)
NRBC # BLD: 0 /100 WBCS — SIGNIFICANT CHANGE UP (ref 0–0)
PHOSPHATE SERPL-MCNC: 3.6 MG/DL — SIGNIFICANT CHANGE UP (ref 2.5–4.5)
PLATELET # BLD AUTO: 248 K/UL — SIGNIFICANT CHANGE UP (ref 150–400)
POTASSIUM SERPL-MCNC: 4.4 MMOL/L — SIGNIFICANT CHANGE UP (ref 3.5–5.3)
POTASSIUM SERPL-SCNC: 4.4 MMOL/L — SIGNIFICANT CHANGE UP (ref 3.5–5.3)
PROT SERPL-MCNC: 6.3 G/DL — SIGNIFICANT CHANGE UP (ref 6–8.3)
RBC # BLD: 3.69 M/UL — LOW (ref 3.8–5.2)
RBC # FLD: 16.2 % — HIGH (ref 10.3–14.5)
SODIUM SERPL-SCNC: 135 MMOL/L — SIGNIFICANT CHANGE UP (ref 135–145)
SPECIMEN SOURCE: SIGNIFICANT CHANGE UP
SPECIMEN SOURCE: SIGNIFICANT CHANGE UP
TROPONIN T, HIGH SENSITIVITY RESULT: 26 NG/L — SIGNIFICANT CHANGE UP (ref 0–51)
WBC # BLD: 11.55 K/UL — HIGH (ref 3.8–10.5)
WBC # FLD AUTO: 11.55 K/UL — HIGH (ref 3.8–10.5)

## 2023-01-27 PROCEDURE — 99233 SBSQ HOSP IP/OBS HIGH 50: CPT | Mod: GC

## 2023-01-27 PROCEDURE — 93010 ELECTROCARDIOGRAM REPORT: CPT | Mod: 76

## 2023-01-27 PROCEDURE — 99222 1ST HOSP IP/OBS MODERATE 55: CPT | Mod: GC

## 2023-01-27 RX ORDER — INSULIN LISPRO 100/ML
3 VIAL (ML) SUBCUTANEOUS
Refills: 0 | Status: DISCONTINUED | OUTPATIENT
Start: 2023-01-27 | End: 2023-01-28

## 2023-01-27 RX ORDER — CASPOFUNGIN ACETATE 7 MG/ML
70 INJECTION, POWDER, LYOPHILIZED, FOR SOLUTION INTRAVENOUS ONCE
Refills: 0 | Status: COMPLETED | OUTPATIENT
Start: 2023-01-27 | End: 2023-01-27

## 2023-01-27 RX ORDER — METOPROLOL TARTRATE 50 MG
5 TABLET ORAL ONCE
Refills: 0 | Status: COMPLETED | OUTPATIENT
Start: 2023-01-27 | End: 2023-01-27

## 2023-01-27 RX ORDER — CASPOFUNGIN ACETATE 7 MG/ML
INJECTION, POWDER, LYOPHILIZED, FOR SOLUTION INTRAVENOUS
Refills: 0 | Status: DISCONTINUED | OUTPATIENT
Start: 2023-01-27 | End: 2023-01-29

## 2023-01-27 RX ORDER — CASPOFUNGIN ACETATE 7 MG/ML
50 INJECTION, POWDER, LYOPHILIZED, FOR SOLUTION INTRAVENOUS EVERY 24 HOURS
Refills: 0 | Status: DISCONTINUED | OUTPATIENT
Start: 2023-01-28 | End: 2023-01-29

## 2023-01-27 RX ORDER — INSULIN GLARGINE 100 [IU]/ML
15 INJECTION, SOLUTION SUBCUTANEOUS AT BEDTIME
Refills: 0 | Status: DISCONTINUED | OUTPATIENT
Start: 2023-01-27 | End: 2023-01-30

## 2023-01-27 RX ORDER — INSULIN LISPRO 100/ML
VIAL (ML) SUBCUTANEOUS AT BEDTIME
Refills: 0 | Status: DISCONTINUED | OUTPATIENT
Start: 2023-01-27 | End: 2023-02-07

## 2023-01-27 RX ORDER — METOPROLOL TARTRATE 50 MG
5 TABLET ORAL ONCE
Refills: 0 | Status: DISCONTINUED | OUTPATIENT
Start: 2023-01-27 | End: 2023-01-27

## 2023-01-27 RX ORDER — METOPROLOL TARTRATE 50 MG
50 TABLET ORAL EVERY 6 HOURS
Refills: 0 | Status: DISCONTINUED | OUTPATIENT
Start: 2023-01-27 | End: 2023-01-30

## 2023-01-27 RX ADMIN — HEPARIN SODIUM 5000 UNIT(S): 5000 INJECTION INTRAVENOUS; SUBCUTANEOUS at 06:20

## 2023-01-27 RX ADMIN — INSULIN GLARGINE 15 UNIT(S): 100 INJECTION, SOLUTION SUBCUTANEOUS at 22:31

## 2023-01-27 RX ADMIN — PIPERACILLIN AND TAZOBACTAM 25 GRAM(S): 4; .5 INJECTION, POWDER, LYOPHILIZED, FOR SOLUTION INTRAVENOUS at 06:19

## 2023-01-27 RX ADMIN — Medication 81 MILLIGRAM(S): at 12:26

## 2023-01-27 RX ADMIN — ATORVASTATIN CALCIUM 40 MILLIGRAM(S): 80 TABLET, FILM COATED ORAL at 22:42

## 2023-01-27 RX ADMIN — Medication 3 MILLILITER(S): at 10:44

## 2023-01-27 RX ADMIN — Medication 5 MILLIGRAM(S): at 14:25

## 2023-01-27 RX ADMIN — Medication 6: at 17:39

## 2023-01-27 RX ADMIN — CHLORHEXIDINE GLUCONATE 1 APPLICATION(S): 213 SOLUTION TOPICAL at 08:45

## 2023-01-27 RX ADMIN — HEPARIN SODIUM 5000 UNIT(S): 5000 INJECTION INTRAVENOUS; SUBCUTANEOUS at 22:32

## 2023-01-27 RX ADMIN — CASPOFUNGIN ACETATE 260 MILLIGRAM(S): 7 INJECTION, POWDER, LYOPHILIZED, FOR SOLUTION INTRAVENOUS at 17:38

## 2023-01-27 RX ADMIN — HEPARIN SODIUM 5000 UNIT(S): 5000 INJECTION INTRAVENOUS; SUBCUTANEOUS at 12:25

## 2023-01-27 RX ADMIN — Medication 50 MILLIGRAM(S): at 22:34

## 2023-01-27 RX ADMIN — Medication 4: at 08:38

## 2023-01-27 RX ADMIN — GABAPENTIN 200 MILLIGRAM(S): 400 CAPSULE ORAL at 12:26

## 2023-01-27 RX ADMIN — GABAPENTIN 200 MILLIGRAM(S): 400 CAPSULE ORAL at 22:33

## 2023-01-27 RX ADMIN — Medication 75 MILLIGRAM(S): at 06:21

## 2023-01-27 RX ADMIN — Medication 3 UNIT(S): at 17:39

## 2023-01-27 RX ADMIN — CHLORHEXIDINE GLUCONATE 1 APPLICATION(S): 213 SOLUTION TOPICAL at 08:42

## 2023-01-27 RX ADMIN — PIPERACILLIN AND TAZOBACTAM 25 GRAM(S): 4; .5 INJECTION, POWDER, LYOPHILIZED, FOR SOLUTION INTRAVENOUS at 18:40

## 2023-01-27 RX ADMIN — Medication 50 MILLIGRAM(S): at 15:46

## 2023-01-27 RX ADMIN — Medication 2: at 22:32

## 2023-01-27 RX ADMIN — GABAPENTIN 200 MILLIGRAM(S): 400 CAPSULE ORAL at 06:22

## 2023-01-27 RX ADMIN — Medication 4: at 12:26

## 2023-01-27 NOTE — PROGRESS NOTE ADULT - SUBJECTIVE AND OBJECTIVE BOX
SUBJECTIVE:   Seen and examined at bedside. Had RRT yesterday for SVTs and being monitored on tele floor. no tele events overnight.     OBJECTIVE: T(C): 36.6 (23 @ 05:00), Max: 36.9 (23 @ 13:09)  HR: 91 (23 @ 05:00) (74 - 136)  BP: 116/70 (23 @ 05:00) (103/65 - 142/72)  RR: 18 (23 @ 05:00) (16 - 18)  SpO2: 96% (23 @ 05:00) (95% - 97%)  Wt(kg): --  I&O's Summary    2023 07:  -  2023 07:00  --------------------------------------------------------  IN: 1060 mL / OUT: 825 mL / NET: 235 mL      I&O's Detail    2023 07:  -  2023 07:00  --------------------------------------------------------  IN:    IV PiggyBack: 100 mL    Oral Fluid: 960 mL  Total IN: 1060 mL    OUT:    Nephrostomy Tube (mL): 625 mL    Voided (mL): 200 mL  Total OUT: 825 mL    Total NET: 235 mL        Physical Exam  General: NAD  REsp: nonlabored   Abdomen: soft, nontender, nondistended. perc rissa tube with bilious output   Vasc: WWP    MEDICATIONS  (STANDING):  aspirin  chewable 81 milliGRAM(s) Oral daily  atorvastatin 40 milliGRAM(s) Oral at bedtime  chlorhexidine 4% Liquid 1 Application(s) Topical <User Schedule>  dextrose 5%. 1000 milliLiter(s) (100 mL/Hr) IV Continuous <Continuous>  dextrose 5%. 1000 milliLiter(s) (50 mL/Hr) IV Continuous <Continuous>  dextrose 50% Injectable 25 Gram(s) IV Push once  dextrose 50% Injectable 12.5 Gram(s) IV Push once  dextrose 50% Injectable 25 Gram(s) IV Push once  gabapentin 200 milliGRAM(s) Oral three times a day  glucagon  Injectable 1 milliGRAM(s) IntraMuscular once  heparin   Injectable 5000 Unit(s) SubCutaneous every 8 hours  indomethacin Suppository 100 milliGRAM(s) Rectal once  insulin glargine Injectable (LANTUS) 10 Unit(s) SubCutaneous at bedtime  insulin lispro (ADMELOG) corrective regimen sliding scale   SubCutaneous three times a day before meals  metoprolol tartrate 75 milliGRAM(s) Oral two times a day  piperacillin/tazobactam IVPB.. 3.375 Gram(s) IV Intermittent every 12 hours    MEDICATIONS  (PRN):  acetaminophen     Tablet .. 650 milliGRAM(s) Oral every 6 hours PRN Temp greater or equal to 38C (100.4F), Mild Pain (1 - 3), Moderate Pain (4 - 6)  albuterol/ipratropium for Nebulization 3 milliLiter(s) Nebulizer every 6 hours PRN Shortness of Breath and/or Wheezing  aluminum hydroxide/magnesium hydroxide/simethicone Suspension 30 milliLiter(s) Oral every 6 hours PRN Dyspepsia  dextrose Oral Gel 15 Gram(s) Oral once PRN Blood Glucose LESS THAN 70 milliGRAM(s)/deciliter  fentaNYL    Injectable 25 MICROGram(s) IV Push every 5 minutes PRN Moderate Pain (4 - 6)  sodium chloride 0.9% lock flush 10 milliLiter(s) IV Push every 1 hour PRN Pre/post blood products, medications, blood draw, and to maintain line patency      LABS:                        10.4   13.71 )-----------( 271      ( 2023 14:13 )             33.5     -    133<L>  |  98  |  53<H>  ----------------------------<  222<H>  3.5   |  20<L>  |  3.79<H>    Ca    8.8      2023 14:13  Phos  4.0       Mg     1.9         TPro  6.5  /  Alb  2.7<L>  /  TBili  0.3  /  DBili  x   /  AST  11  /  ALT  10  /  AlkPhos  117        Urinalysis Basic - ( 2023 12:41 )    Color: Yellow / Appearance: Slightly Turbid / S.016 / pH: x  Gluc: x / Ketone: Trace  / Bili: Negative / Urobili: Negative   Blood: x / Protein: 30 mg/dL / Nitrite: Negative   Leuk Esterase: Small / RBC: 87 /hpf / WBC 13 /HPF   Sq Epi: x / Non Sq Epi: 3 /hpf / Bacteria: Negative

## 2023-01-27 NOTE — PROGRESS NOTE ADULT - SUBJECTIVE AND OBJECTIVE BOX
DATE OF SERVICE: 01-27-23 @ 11:20    Patient is a 82y old  Female who presents with a chief complaint of acute cholecystitis, DKA (27 Jan 2023 07:58)      INTERVAL HISTORY: Feels ok.     REVIEW OF SYSTEMS:  CONSTITUTIONAL: No weakness  EYES/ENT: No visual changes;  No throat pain   NECK: No pain or stiffness  RESPIRATORY: No cough, wheezing; No shortness of breath  CARDIOVASCULAR: No chest pain or palpitations  GASTROINTESTINAL: No abdominal  pain. No nausea, vomiting, or hematemesis  GENITOURINARY: No dysuria, frequency or hematuria  NEUROLOGICAL: No stroke like symptoms  SKIN: No rashes    TELEMETRY Personally reviewed: SR 70-80 PACs  	  MEDICATIONS:  metoprolol tartrate 75 milliGRAM(s) Oral two times a day        PHYSICAL EXAM:  T(C): 36.7 (01-27-23 @ 10:23), Max: 36.9 (01-26-23 @ 13:09)  HR: 71 (01-27-23 @ 10:23) (71 - 136)  BP: 108/69 (01-27-23 @ 10:23) (106/77 - 142/72)  RR: 18 (01-27-23 @ 10:23) (16 - 18)  SpO2: 95% (01-27-23 @ 10:23) (95% - 97%)  Wt(kg): --  I&O's Summary    26 Jan 2023 07:01  -  27 Jan 2023 07:00  --------------------------------------------------------  IN: 1060 mL / OUT: 825 mL / NET: 235 mL    27 Jan 2023 07:01  -  27 Jan 2023 11:20  --------------------------------------------------------  IN: 240 mL / OUT: 240 mL / NET: 0 mL          Appearance: In no distress	  HEENT:    PERRL, EOMI	  Cardiovascular:  S1 S2, No JVD  Respiratory: Lungs clear to auscultation	  Gastrointestinal:  Soft, Non-tender, + BS	  Vascularature:  No edema of LE  Psychiatric: Appropriate affect   Neuro: no acute focal deficits                               10.9   11.55 )-----------( 248      ( 27 Jan 2023 07:14 )             35.6     01-27    135  |  99  |  52<H>  ----------------------------<  236<H>  4.4   |  17<L>  |  3.82<H>    Ca    8.6      27 Jan 2023 07:29  Phos  3.6     01-27  Mg     2.1     01-27    TPro  6.3  /  Alb  2.6<L>  /  TBili  0.2  /  DBili  x   /  AST  19  /  ALT  10  /  AlkPhos  124<H>  01-27        Labs personally reviewed      ASSESSMENT/PLAN: 	    80 year old woman with CAD s/p CABG in 2020, prior DVT on Eliquis, HTN, and compensated diastolic CHF presents with acute cholecystitis.       #Post-operative risk evaluation-  S/p ERCP with CBD stent. Tolerate procedure well.   Ms. Batista displays no signs or symptoms of acute coronary ischemia or decompensated CHF.  Admission EKG is sinus with pre-existing anteroseptal q-waves.  Recent echo noted- normal LV systolic function with no hemodynamically significant valvular disease.  s/p drain placement vai IR 1/25    #Compensated diastolic CHF-  Resume PO Diuretics following procedure pending Renal in agreement    #CAD s/p CABG-  No signs of active ischemic.  c/w ASA and statin  - 1/26 with c/o epigastric burning a/b episodes of emesis. ECG performed, no ischemic changes noted.     #Sepsis   - 2/2 acute cholecystitis   - S/p CBD stent  - s/p IR drain 1/25  - Afebrile, WBC downtrending    #SVT  - 1/26 patent HR in 130s while sleeping. Patient missed am dose of Metoprolol 150mg PO.  - Resume Metoprolol  - Adenosine 6mg IV x1 with termination of SVT  - cont to monitor on tele        MIKE Smith DO [facility]  Cardiovascular Medicine  [address], [address]  Available through call or text on [employer] TEAMs  Office: [phone]   DATE OF SERVICE: 01-27-23 @ 11:20    Patient is a 82y old  Female who presents with a chief complaint of acute cholecystitis, DKA (27 Jan 2023 07:58)      INTERVAL HISTORY: Feels ok.     REVIEW OF SYSTEMS:  CONSTITUTIONAL: No weakness  EYES/ENT: No visual changes;  No throat pain   NECK: No pain or stiffness  RESPIRATORY: No cough, wheezing; No shortness of breath  CARDIOVASCULAR: No chest pain or palpitations  GASTROINTESTINAL: No abdominal  pain. No nausea, vomiting, or hematemesis  GENITOURINARY: No dysuria, frequency or hematuria  NEUROLOGICAL: No stroke like symptoms  SKIN: No rashes    TELEMETRY Personally reviewed: SR 70-80 PACs  	  MEDICATIONS:  metoprolol tartrate 75 milliGRAM(s) Oral two times a day        PHYSICAL EXAM:  T(C): 36.7 (01-27-23 @ 10:23), Max: 36.9 (01-26-23 @ 13:09)  HR: 71 (01-27-23 @ 10:23) (71 - 136)  BP: 108/69 (01-27-23 @ 10:23) (106/77 - 142/72)  RR: 18 (01-27-23 @ 10:23) (16 - 18)  SpO2: 95% (01-27-23 @ 10:23) (95% - 97%)  Wt(kg): --  I&O's Summary    26 Jan 2023 07:01  -  27 Jan 2023 07:00  --------------------------------------------------------  IN: 1060 mL / OUT: 825 mL / NET: 235 mL    27 Jan 2023 07:01  -  27 Jan 2023 11:20  --------------------------------------------------------  IN: 240 mL / OUT: 240 mL / NET: 0 mL          Appearance: In no distress	  HEENT:    PERRL, EOMI	  Cardiovascular:  S1 S2, No JVD  Respiratory: Lungs clear to auscultation	  Gastrointestinal:  Soft, Non-tender, + BS	  Vascularature:  No edema of LE  Psychiatric: Appropriate affect   Neuro: no acute focal deficits                               10.9   11.55 )-----------( 248      ( 27 Jan 2023 07:14 )             35.6     01-27    135  |  99  |  52<H>  ----------------------------<  236<H>  4.4   |  17<L>  |  3.82<H>    Ca    8.6      27 Jan 2023 07:29  Phos  3.6     01-27  Mg     2.1     01-27    TPro  6.3  /  Alb  2.6<L>  /  TBili  0.2  /  DBili  x   /  AST  19  /  ALT  10  /  AlkPhos  124<H>  01-27        Labs personally reviewed      ASSESSMENT/PLAN: 	    80 year old woman with CAD s/p CABG in 2020, prior DVT on Eliquis, HTN, and compensated diastolic CHF presents with acute cholecystitis.       #Post-operative risk evaluation-  S/p ERCP with CBD stent. Tolerate procedure well.   Ms. Batista displays no signs or symptoms of acute coronary ischemia or decompensated CHF.  Admission EKG is sinus with pre-existing anteroseptal q-waves.  Recent echo noted- normal LV systolic function with no hemodynamically significant valvular disease.  s/p drain placement vai IR 1/25    #Compensated diastolic CHF-  Resume PO Diuretics following procedure pending Renal in agreement    #CAD s/p CABG-  No signs of active ischemic.  c/w ASA and statin  - 1/26 with c/o epigastric burning a/b episodes of emesis. ECG performed, no ischemic changes noted.     #Sepsis   - 2/2 acute cholecystitis   - S/p CBD stent  - s/p IR drain 1/25  - Afebrile, WBC downtrending    #SVT  - 1/26 patent HR in 130s while sleeping. Patient missed am dose of Metoprolol 150mg PO.  - Resume Metoprolol  - Adenosine 6mg IV x1 with termination of SVT  - cont to monitor on tele  - 1/27 with recurrent SVT s/p adenosine today   - EP consulted. Likely A-Tach. Recommend c/w Metoprolol 75mg PO BID and uptitrate as tolerated.         Delaney PedroMIKE perez DO [facility]  Cardiovascular Medicine  [address], [address]  Available through call or text on [employer] TEAMs  Office: [phone]   DATE OF SERVICE: 01-27-23 @ 11:20    Patient is a 82y old  Female who presents with a chief complaint of acute cholecystitis, DKA (27 Jan 2023 07:58)      INTERVAL HISTORY: Feels ok.     REVIEW OF SYSTEMS:  CONSTITUTIONAL: No weakness  EYES/ENT: No visual changes;  No throat pain   NECK: No pain or stiffness  RESPIRATORY: No cough, wheezing; No shortness of breath  CARDIOVASCULAR: No chest pain or palpitations  GASTROINTESTINAL: No abdominal  pain. No nausea, vomiting, or hematemesis  GENITOURINARY: No dysuria, frequency or hematuria  NEUROLOGICAL: No stroke like symptoms  SKIN: No rashes    TELEMETRY Personally reviewed: SR 70-80 PACs  	  MEDICATIONS:  metoprolol tartrate 75 milliGRAM(s) Oral two times a day        PHYSICAL EXAM:  T(C): 36.7 (01-27-23 @ 10:23), Max: 36.9 (01-26-23 @ 13:09)  HR: 71 (01-27-23 @ 10:23) (71 - 136)  BP: 108/69 (01-27-23 @ 10:23) (106/77 - 142/72)  RR: 18 (01-27-23 @ 10:23) (16 - 18)  SpO2: 95% (01-27-23 @ 10:23) (95% - 97%)  Wt(kg): --  I&O's Summary    26 Jan 2023 07:01  -  27 Jan 2023 07:00  --------------------------------------------------------  IN: 1060 mL / OUT: 825 mL / NET: 235 mL    27 Jan 2023 07:01  -  27 Jan 2023 11:20  --------------------------------------------------------  IN: 240 mL / OUT: 240 mL / NET: 0 mL          Appearance: In no distress	  HEENT:    PERRL, EOMI	  Cardiovascular:  S1 S2, No JVD  Respiratory: Lungs clear to auscultation	  Gastrointestinal:  Soft, Non-tender, + BS	  Vascularature:  No edema of LE  Psychiatric: Appropriate affect   Neuro: no acute focal deficits                               10.9   11.55 )-----------( 248      ( 27 Jan 2023 07:14 )             35.6     01-27    135  |  99  |  52<H>  ----------------------------<  236<H>  4.4   |  17<L>  |  3.82<H>    Ca    8.6      27 Jan 2023 07:29  Phos  3.6     01-27  Mg     2.1     01-27    TPro  6.3  /  Alb  2.6<L>  /  TBili  0.2  /  DBili  x   /  AST  19  /  ALT  10  /  AlkPhos  124<H>  01-27        Labs personally reviewed      ASSESSMENT/PLAN: 	    80 year old woman with CAD s/p CABG in 2020, prior DVT on Eliquis, HTN, and compensated diastolic CHF presents with acute cholecystitis.       #Post-operative risk evaluation-  S/p ERCP with CBD stent. Tolerate procedure well.   Ms. Batista displays no signs or symptoms of acute coronary ischemia or decompensated CHF.  Admission EKG is sinus with pre-existing anteroseptal q-waves.  Recent echo noted- normal LV systolic function with no hemodynamically significant valvular disease.  s/p drain placement vai IR 1/25    #Compensated diastolic CHF-  Resume PO Diuretics following procedure pending Renal in agreement    #CAD s/p CABG-  No signs of active ischemic.  c/w ASA and statin  - 1/26 with c/o epigastric burning a/b episodes of emesis. ECG performed, no ischemic changes noted.     #Sepsis   - 2/2 acute cholecystitis   - S/p CBD stent  - s/p IR drain 1/25  - Afebrile, WBC downtrending    #SVT  - 1/26 patent HR in 130s while sleeping. Patient missed am dose of Metoprolol 150mg PO.  - Resume Metoprolol  - Adenosine 6mg IV x1 with termination of SVT  - cont to monitor on tele  - 1/27 with recurrent SVT s/p adenosine today   - EP consulted. Likely A-Tach. Recommend c/w Metoprolol 75mg PO BID and uptitrate as tolerated.         Delaney PedroMIKE perez DO [facility]  Cardiovascular Medicine  [address], [address]  Available through call or text on [employer] TEAMs  Office: [phone]

## 2023-01-27 NOTE — RAPID RESPONSE TEAM SUMMARY - NSSITUATIONBACKGROUNDRRT_GEN_ALL_CORE
82F HFpEF, CAD, admitted to hospital for abdominal pain, s/p ERCP and percutaneous cholecystostomy. On arrival, patient in no acute distress. Primary team present, with Cardiologist. Informed patient HR in 140s, EKG showing SVT. Patient asymptomatic, HDS otherwise. Team attempted 5mg lopressor with no effect. Pushed 6mg adenosine with immediate reduction in HR, NSR, BP stable, patient with some discomfort but otherwise stable. Team to continue follow-up any labs, TTE, EP eval. Patient remained on 9M  
 82 year old F with a PMHx of HTN, HLD, T2DM, HFpEF (EF 65% X/2022), breast cancer s/p R partial mastectomy, rectal carcinoma s/p resection, CAD s/p CABG who presents for 1 week of intermittent abdominal pain associated with nausea and vomiting, found to have acute cholecystitis with choledocholithiasis. Patient initially underwent ERCP with CBD placement but was found to have a new fluid collection adjacent to the gallbladder neck, now s/p percutaneous cholecystostomy.

## 2023-01-27 NOTE — CONSULT NOTE ADULT - SUBJECTIVE AND OBJECTIVE BOX
HPI:  82F with HTN, HLD, T2DM, HFpEF (EF 65% /), breast cancer s/p R partial mastectomy, rectal carcinoma s/p resection, CAD s/p CABG presents () with 1 week of intermittent abdominal pain associated with nausea and vomiting, found to have acute cholecystitis with choledocholithiasis s/p ERCP with biliary stent placement (), complicated by worsening leukocytosis and abdominal pain, repeat imaging found new fluid collection adjacent to the gallbladder neck, now s/p percutaneous cholecystostomy (). ID consulted for yeast in the Urine.    Patient ---     CT A/P: acute cholecystitis with 1.2 cm common bile duct dilatation  ERCP  : choledocholithiasis w/ large stone w/ sphincterotomy and biliary stent placement  repeat CT A/P and MRI non con concerning for multiloculated fluid collection around the neck of the gallbladder, possible perf                PAST MEDICAL & SURGICAL HISTORY:  Breast CA, right   s/p mastectomy ,IV Chemotherapy  HTN (hypertension)  Lymphedema of arm  right arm s/p mastectomy  Hyperlipidemia  Rectal cancer  s/p chemotherapy and  radiation 12 weeks 2015 -3/2015  Obese  Type II diabetes mellitus  CHF (congestive heart failure)  DVT of leg (deep venous thrombosis)  right calf DVT  2019 pt on Eliquis  Renal insufficiency  S/P mastectomy, right    History of appendectomy    S/P ileosmy  low anterior resection-8/10/15, reversal of ileostomy   S/P colon resection    S/P TKR (total knee replacement), right  2017          Allergies  CT scan dye (Hives)  penicillin (Unknown)    ANTIMICROBIALS (past 90 days)  MEDICATIONS  (STANDING):  s/p Fluconazole ()    piperacillin/tazobactam IVPB.. 3.375 every 12 hours ( --- )    MEDICATIONS  (STANDING):  acetaminophen     Tablet .. 650 every 6 hours PRN  albuterol/ipratropium for Nebulization 3 every 6 hours PRN  aluminum hydroxide/magnesium hydroxide/simethicone Suspension 30 every 6 hours PRN  aspirin  chewable 81 daily  atorvastatin 40 at bedtime  dextrose 50% Injectable 25 once  dextrose 50% Injectable 12.5 once  dextrose 50% Injectable 25 once  dextrose Oral Gel 15 once PRN  fentaNYL    Injectable 25 every 5 minutes PRN  gabapentin 200 three times a day  glucagon  Injectable 1 once  heparin   Injectable 5000 every 8 hours  indomethacin Suppository 100 once  insulin glargine Injectable (LANTUS) 15 at bedtime  insulin lispro (ADMELOG) corrective regimen sliding scale  three times a day before meals  metoprolol tartrate 75 two times a day    SOCIAL HISTORY:       FAMILY HISTORY:  Family history of diabetes mellitus in mother    Family history of diabetes mellitus in son    Family history of heart attack (Child)      REVIEW OF SYSTEMS  [  ] ROS unobtainable because:    [  ] All other systems negative except as noted below:	    Constitutional:  [ ] fever [ ] chills  [ ] weight loss  [ ] weakness  Skin:  [ ] rash [ ] phlebitis	  Eyes: [ ] icterus [ ] pain  [ ] discharge	  ENMT: [ ] sore throat  [ ] thrush [ ] ulcers [ ] exudates  Respiratory: [ ] dyspnea [ ] hemoptysis [ ] cough [ ] sputum	  Cardiovascular:  [ ] chest pain [ ] palpitations [ ] edema	  Gastrointestinal:  [ ] nausea [ ] vomiting [ ] diarrhea [ ] constipation [ ] pain	  Genitourinary:  [ ] dysuria [ ] frequency [ ] hematuria [ ] discharge [ ] flank pain  [ ] incontinence  Musculoskeletal:  [ ] myalgias [ ] arthralgias [ ] arthritis  [ ] back pain  Neurological:  [ ] headache [ ] seizures  [ ] confusion/altered mental status  Psychiatric:  [ ] anxiety [ ] depression	  Hematology/Lymphatics:  [ ] lymphadenopathy  Endocrine:  [ ] adrenal [ ] thyroid  Allergic/Immunologic:	 [ ] transplant [ ] seasonal    Vital Signs Last 24 Hrs  T(F): 98 (23 @ 10:23), Max: 100.5 (23 @ 09:01)  Vital Signs Last 24 Hrs  HR: 71 (23 @ 10:23) (71 - 136)  BP: 108/69 (23 @ 10:23) (106/77 - 142/72)  RR: 18 (23 @ 10:23)  SpO2: 95% (23 @ 10:23) (95% - 97%)  Wt(kg): --    Physical Exam:  Constitutional:  well preserved, comfortable  Head/Eyes: no icterus  ENT:  supple, no cervical lymphadenopathy   LUNGS:  CTA  CVS:  regular rhythm, no murmur  Abd:  soft, non-tender; non-distended  Ext:  no edema  Vascular:  IV site no erythema tenderness or discharge  MSK:  joints without swelling  Neuro: AAO X 3, non- focal                          10.9   11.55 )-----------( 248      ( 2023 07:14 )             35.6       135  |  99  |  52<H>  ----------------------------<  236<H>  4.4   |  17<L>  |  3.82<H>    Ca    8.6      2023 07:29  Phos  3.6       Mg     2.1         TPro  6.3  /  Alb  2.6<L>  /  TBili  0.2  /  DBili  x   /  AST  19  /  ALT  10  /  AlkPhos  124<H>      Urinalysis Basic - ( 2023 12:41 )    Color: Yellow / Appearance: Slightly Turbid / S.016 / pH: x  Gluc: x / Ketone: Trace  / Bili: Negative / Urobili: Negative   Blood: x / Protein: 30 mg/dL / Nitrite: Negative   Leuk Esterase: Small / RBC: 87 /hpf / WBC 13 /HPF   Sq Epi: x / Non Sq Epi: 3 /hpf / Bacteria: Negative    MICROBIOLOGY:  Culture - Body Fluid with Gram Stain (collected 2023 15:34)  Source: Abdominal Fl Abdominal Fluid  Gram Stain (2023 23:26):    No polymorphonuclear cells seen per low power field    Few Gram Positive Rods per oil power field  Preliminary Report (2023 17:46):    Rare Yeast like cells Identification to follow.    Rapid RVP Result: NotDetec ( @ 07:26)      RADIOLOGY:  imaging below personally reviewed and agree with findings  < from: MR Abdomen No Cont (23 @ 18:13) >    IMPRESSION:  1.  Acute cholecystitis.  2.  A 5.5 cm multiloculated fluid collection adjacent to the gallbladder   neck could be due to gallbladder perforation, limited in evaluation   without IV contrast.    < end of copied text >  < from: US Kidney and Bladder (23 @ 18:04) >  IMPRESSION:    Chronic severe right-sided hydronephrosis with cortical thinning   unchanged.    < end of copied text >  < from: CT Abdomen and Pelvis No Cont (23 @ 15:29) >  IMPRESSION:  New ill-defined collection in the region of the gallbladder neck and   common bile duct also encompassing the second portionof the duodenum.   Findings may be related to bile leak, perforated cholecystitis, or   duodenal hematoma. Recommend further evaluation with MRI.    Interval placement of a stent within the common bile duct with a residual   CBD stones adjacent to the stent.    < end of copied text >  < from: Xray Chest 1 View- PORTABLE-Urgent (Xray Chest 1 View- PORTABLE-Urgent .) (23 @ 18:42) >  Impression:    Small right pleural effusion with associated atelectasis. Prominent   pulmonary marking may represent fluid overload      Enlarged but stable cardiac silhouette.    Osseous structures are unremarkable for age.    < end of copied text >     HPI:  82F with HTN, HLD, T2DM, HFpEF (EF 65% /), breast cancer s/p R partial mastectomy, rectal carcinoma s/p resection, CAD s/p CABG presents () with 1 week of intermittent abdominal pain associated with nausea and vomiting, found to have acute cholecystitis with choledocholithiasis s/p ERCP with biliary stent placement (), complicated by worsening leukocytosis and abdominal pain, repeat imaging found new fluid collection adjacent to the gallbladder neck, now s/p percutaneous cholecystostomy (). ID consulted for yeast in the abdominal fluid culture.    Patient ---     CT A/P: acute cholecystitis with 1.2 cm common bile duct dilatation  ERCP  : choledocholithiasis w/ large stone w/ sphincterotomy and biliary stent placement  repeat CT A/P and MRI non con concerning for multiloculated fluid collection around the neck of the gallbladder, possible perf                PAST MEDICAL & SURGICAL HISTORY:  Breast CA, right   s/p mastectomy ,IV Chemotherapy  HTN (hypertension)  Lymphedema of arm  right arm s/p mastectomy  Hyperlipidemia  Rectal cancer  s/p chemotherapy and  radiation 12 weeks 2015 -3/2015  Obese  Type II diabetes mellitus  CHF (congestive heart failure)  DVT of leg (deep venous thrombosis)  right calf DVT  2019 pt on Eliquis  Renal insufficiency  S/P mastectomy, right    History of appendectomy    S/P ileosmy  low anterior resection-8/10/15, reversal of ileostomy   S/P colon resection    S/P TKR (total knee replacement), right  2017          Allergies  CT scan dye (Hives)  penicillin (Unknown)    ANTIMICROBIALS (past 90 days)  MEDICATIONS  (STANDING):  s/p Fluconazole ()    piperacillin/tazobactam IVPB.. 3.375 every 12 hours ( --- )    MEDICATIONS  (STANDING):  acetaminophen     Tablet .. 650 every 6 hours PRN  albuterol/ipratropium for Nebulization 3 every 6 hours PRN  aluminum hydroxide/magnesium hydroxide/simethicone Suspension 30 every 6 hours PRN  aspirin  chewable 81 daily  atorvastatin 40 at bedtime  dextrose 50% Injectable 25 once  dextrose 50% Injectable 12.5 once  dextrose 50% Injectable 25 once  dextrose Oral Gel 15 once PRN  fentaNYL    Injectable 25 every 5 minutes PRN  gabapentin 200 three times a day  glucagon  Injectable 1 once  heparin   Injectable 5000 every 8 hours  indomethacin Suppository 100 once  insulin glargine Injectable (LANTUS) 15 at bedtime  insulin lispro (ADMELOG) corrective regimen sliding scale  three times a day before meals  metoprolol tartrate 75 two times a day    SOCIAL HISTORY:       FAMILY HISTORY:  Family history of diabetes mellitus in mother    Family history of diabetes mellitus in son    Family history of heart attack (Child)      REVIEW OF SYSTEMS  [  ] ROS unobtainable because:    [  ] All other systems negative except as noted below:	    Constitutional:  [ ] fever [ ] chills  [ ] weight loss  [ ] weakness  Skin:  [ ] rash [ ] phlebitis	  Eyes: [ ] icterus [ ] pain  [ ] discharge	  ENMT: [ ] sore throat  [ ] thrush [ ] ulcers [ ] exudates  Respiratory: [ ] dyspnea [ ] hemoptysis [ ] cough [ ] sputum	  Cardiovascular:  [ ] chest pain [ ] palpitations [ ] edema	  Gastrointestinal:  [ ] nausea [ ] vomiting [ ] diarrhea [ ] constipation [ ] pain	  Genitourinary:  [ ] dysuria [ ] frequency [ ] hematuria [ ] discharge [ ] flank pain  [ ] incontinence  Musculoskeletal:  [ ] myalgias [ ] arthralgias [ ] arthritis  [ ] back pain  Neurological:  [ ] headache [ ] seizures  [ ] confusion/altered mental status  Psychiatric:  [ ] anxiety [ ] depression	  Hematology/Lymphatics:  [ ] lymphadenopathy  Endocrine:  [ ] adrenal [ ] thyroid  Allergic/Immunologic:	 [ ] transplant [ ] seasonal    Vital Signs Last 24 Hrs  T(F): 98 (23 @ 10:23), Max: 100.5 (23 @ 09:01)  Vital Signs Last 24 Hrs  HR: 71 (23 @ 10:23) (71 - 136)  BP: 108/69 (23 @ 10:23) (106/77 - 142/72)  RR: 18 (23 @ 10:23)  SpO2: 95% (23 @ 10:23) (95% - 97%)  Wt(kg): --    Physical Exam:  Constitutional:  well preserved, comfortable  Head/Eyes: no icterus  ENT:  supple, no cervical lymphadenopathy   LUNGS:  CTA  CVS:  regular rhythm, no murmur  Abd:  soft, non-tender; non-distended  Ext:  no edema  Vascular:  IV site no erythema tenderness or discharge  MSK:  joints without swelling  Neuro: AAO X 3, non- focal                          10.9   11.55 )-----------( 248      ( 2023 07:14 )             35.6       135  |  99  |  52<H>  ----------------------------<  236<H>  4.4   |  17<L>  |  3.82<H>    Ca    8.6      2023 07:29  Phos  3.6       Mg     2.1         TPro  6.3  /  Alb  2.6<L>  /  TBili  0.2  /  DBili  x   /  AST  19  /  ALT  10  /  AlkPhos  124<H>      Urinalysis Basic - ( 2023 12:41 )    Color: Yellow / Appearance: Slightly Turbid / S.016 / pH: x  Gluc: x / Ketone: Trace  / Bili: Negative / Urobili: Negative   Blood: x / Protein: 30 mg/dL / Nitrite: Negative   Leuk Esterase: Small / RBC: 87 /hpf / WBC 13 /HPF   Sq Epi: x / Non Sq Epi: 3 /hpf / Bacteria: Negative    MICROBIOLOGY:  Culture - Body Fluid with Gram Stain (collected 2023 15:34)  Source: Abdominal Fl Abdominal Fluid  Gram Stain (2023 23:26):    No polymorphonuclear cells seen per low power field    Few Gram Positive Rods per oil power field  Preliminary Report (2023 17:46):    Rare Yeast like cells Identification to follow.    Rapid RVP Result: NotDetec ( @ 07:26)      RADIOLOGY:  imaging below personally reviewed and agree with findings  < from: MR Abdomen No Cont (23 @ 18:13) >    IMPRESSION:  1.  Acute cholecystitis.  2.  A 5.5 cm multiloculated fluid collection adjacent to the gallbladder   neck could be due to gallbladder perforation, limited in evaluation   without IV contrast.    < end of copied text >  < from: US Kidney and Bladder (23 @ 18:04) >  IMPRESSION:    Chronic severe right-sided hydronephrosis with cortical thinning   unchanged.    < end of copied text >  < from: CT Abdomen and Pelvis No Cont (23 @ 15:29) >  IMPRESSION:  New ill-defined collection in the region of the gallbladder neck and   common bile duct also encompassing the second portionof the duodenum.   Findings may be related to bile leak, perforated cholecystitis, or   duodenal hematoma. Recommend further evaluation with MRI.    Interval placement of a stent within the common bile duct with a residual   CBD stones adjacent to the stent.    < end of copied text >  < from: Xray Chest 1 View- PORTABLE-Urgent (Xray Chest 1 View- PORTABLE-Urgent .) (23 @ 18:42) >  Impression:    Small right pleural effusion with associated atelectasis. Prominent   pulmonary marking may represent fluid overload      Enlarged but stable cardiac silhouette.    Osseous structures are unremarkable for age.    < end of copied text >     HPI:  82F with HTN, HLD, T2DM, HFpEF (EF 65% ), breast cancer s/p R partial mastectomy, rectal carcinoma s/p resection, CAD s/p CABG presents () with 1 week of intermittent abdominal pain associated with nausea and vomiting, found to have acute cholecystitis with choledocholithiasis s/p ERCP with biliary stent placement (), complicated by worsening leukocytosis and abdominal pain, repeat imaging found new fluid collection adjacent to the gallbladder neck, now s/p percutaneous cholecystostomy (). ID consulted for yeast in the abdominal fluid culture.    Patient reports doing well. Daughter at bedside helping with translation.   Reports that her abdominal pain is improved after s/p percutaneous cholecystostomy. Denies any fever, chills. No dysuria, cough, diarrhea.     Afebrile, last fever 100.5F ()   WBC 11 < 13  Cr 3.8  CXR with Pulmonary edema  CT A/P (): acute cholecystitis with 1.2 cm common bile duct dilatation  ERCP  : choledocholithiasis w/ large stone w/ sphincterotomy and biliary stent placement  repeat CT A/P and MRI non con () concerning for multiloculated fluid collection around the neck of the gallbladder, possible perforation   UA grossly negative  BCx ( - ) NGTD  RVP negative  Abd fluid () Culture: rare yeast-like cells      PAST MEDICAL & SURGICAL HISTORY:  Breast CA, right   s/p mastectomy ,IV Chemotherapy  HTN (hypertension)  Lymphedema of arm  right arm s/p mastectomy  Hyperlipidemia  Rectal cancer  s/p chemotherapy and  radiation 12 weeks 2015 -3/2015  Obese  Type II diabetes mellitus  CHF (congestive heart failure)  DVT of leg (deep venous thrombosis)  right calf DVT  2019 pt on Eliquis  Renal insufficiency  S/P mastectomy, right    History of appendectomy    S/P ileosmy  low anterior resection-8/10/15, reversal of ileostomy   S/P colon resection  2015  S/P TKR (total knee replacement), right  2017          Allergies  CT scan dye (Hives)  penicillin (Unknown)    ANTIMICROBIALS (past 90 days)  MEDICATIONS  (STANDING):  s/p Fluconazole ()    piperacillin/tazobactam IVPB.. 3.375 every 12 hours (1/18 --- )    MEDICATIONS  (STANDING):  acetaminophen     Tablet .. 650 every 6 hours PRN  albuterol/ipratropium for Nebulization 3 every 6 hours PRN  aluminum hydroxide/magnesium hydroxide/simethicone Suspension 30 every 6 hours PRN  aspirin  chewable 81 daily  atorvastatin 40 at bedtime  dextrose 50% Injectable 25 once  dextrose 50% Injectable 12.5 once  dextrose 50% Injectable 25 once  dextrose Oral Gel 15 once PRN  fentaNYL    Injectable 25 every 5 minutes PRN  gabapentin 200 three times a day  glucagon  Injectable 1 once  heparin   Injectable 5000 every 8 hours  indomethacin Suppository 100 once  insulin glargine Injectable (LANTUS) 15 at bedtime  insulin lispro (ADMELOG) corrective regimen sliding scale  three times a day before meals  metoprolol tartrate 75 two times a day    SOCIAL HISTORY:   Denies alcohol, tobacco, recreational drug use    FAMILY HISTORY:  Family history of diabetes mellitus in mother    Family history of diabetes mellitus in son    Family history of heart attack (Child)      REVIEW OF SYSTEMS  [  ] ROS unobtainable because:    [ x ] All other systems negative except as noted below:	    Constitutional:  [ ] fever [ ] chills  [ ] weight loss  [ ] weakness  Skin:  [ ] rash [ ] phlebitis	  Eyes: [ ] icterus [ ] pain  [ ] discharge	  ENMT: [ ] sore throat  [ ] thrush [ ] ulcers [ ] exudates  Respiratory: [ ] dyspnea [ ] hemoptysis [ ] cough [ ] sputum	  Cardiovascular:  [ ] chest pain [ ] palpitations [ ] edema	  Gastrointestinal:  [ ] nausea [ ] vomiting [ ] diarrhea [ ] constipation [ ] pain	  Genitourinary:  [ ] dysuria [ ] frequency [ ] hematuria [ ] discharge [ ] flank pain  [ ] incontinence  Musculoskeletal:  [ ] myalgias [ ] arthralgias [ ] arthritis  [ ] back pain  Neurological:  [ ] headache [ ] seizures  [ ] confusion/altered mental status  Psychiatric:  [ ] anxiety [ ] depression	  Hematology/Lymphatics:  [ ] lymphadenopathy  Endocrine:  [ ] adrenal [ ] thyroid  Allergic/Immunologic:	 [ ] transplant [ ] seasonal    Vital Signs Last 24 Hrs  T(F): 98 (23 @ 10:23), Max: 100.5 (23 @ 09:01)  Vital Signs Last 24 Hrs  HR: 71 (23 @ 10:23) (71 - 136)  BP: 108/69 (23 @ 10:23) (106/77 - 142/72)  RR: 18 (23 @ 10:23)  SpO2: 95% (23 @ 10:23) (95% - 97%)  Wt(kg): --    Physical Exam:  Constitutional:  well preserved, comfortable  Head/Eyes: no icterus  ENT:  supple, no cervical lymphadenopathy   LUNGS:  CTA  CVS:  regular rhythm, no murmur  Abd:  soft, non-tender; non-distended +R perc cholecystotomy tube  Ext:  no edema  Vascular:  IV site no erythema tenderness or discharge  MSK:  joints without swelling  Neuro: AAO X 3, non- focal                          10.9   11.55 )-----------( 248      ( 2023 07:14 )             35.6       135  |  99  |  52<H>  ----------------------------<  236<H>  4.4   |  17<L>  |  3.82<H>    Ca    8.6      2023 07:29  Phos  3.6       Mg     2.1         TPro  6.3  /  Alb  2.6<L>  /  TBili  0.2  /  DBili  x   /  AST  19  /  ALT  10  /  AlkPhos  124<H>      Urinalysis Basic - ( 2023 12:41 )    Color: Yellow / Appearance: Slightly Turbid / S.016 / pH: x  Gluc: x / Ketone: Trace  / Bili: Negative / Urobili: Negative   Blood: x / Protein: 30 mg/dL / Nitrite: Negative   Leuk Esterase: Small / RBC: 87 /hpf / WBC 13 /HPF   Sq Epi: x / Non Sq Epi: 3 /hpf / Bacteria: Negative    MICROBIOLOGY:  Culture - Body Fluid with Gram Stain (collected 2023 15:34)  Source: Abdominal Fl Abdominal Fluid  Gram Stain (2023 23:26):    No polymorphonuclear cells seen per low power field    Few Gram Positive Rods per oil power field  Preliminary Report (2023 17:46):    Rare Yeast like cells Identification to follow.    Rapid RVP Result: NotDetec ( @ 07:26)      RADIOLOGY:  imaging below personally reviewed and agree with findings  < from: MR Abdomen No Cont (23 @ 18:13) >    IMPRESSION:  1.  Acute cholecystitis.  2.  A 5.5 cm multiloculated fluid collection adjacent to the gallbladder   neck could be due to gallbladder perforation, limited in evaluation   without IV contrast.    < end of copied text >  < from: US Kidney and Bladder (23 @ 18:04) >  IMPRESSION:    Chronic severe right-sided hydronephrosis with cortical thinning   unchanged.    < end of copied text >  < from: CT Abdomen and Pelvis No Cont (23 @ 15:29) >  IMPRESSION:  New ill-defined collection in the region of the gallbladder neck and   common bile duct also encompassing the second portionof the duodenum.   Findings may be related to bile leak, perforated cholecystitis, or   duodenal hematoma. Recommend further evaluation with MRI.    Interval placement of a stent within the common bile duct with a residual   CBD stones adjacent to the stent.    < end of copied text >  < from: Xray Chest 1 View- PORTABLE-Urgent (Xray Chest 1 View- PORTABLE-Urgent .) (23 @ 18:42) >  Impression:    Small right pleural effusion with associated atelectasis. Prominent   pulmonary marking may represent fluid overload      Enlarged but stable cardiac silhouette.    Osseous structures are unremarkable for age.    < end of copied text >

## 2023-01-27 NOTE — PROGRESS NOTE ADULT - PROBLEM SELECTOR PLAN 6
Diet: regular, CC  DVT PPx: heparin subq (held in case of IR procedure)  Dispo: pending clinical improvement Continue home meds atorvastatin 40, asa 81

## 2023-01-27 NOTE — PROGRESS NOTE ADULT - PROBLEM SELECTOR PLAN 1
Patient presenting with 1 week of abdominal pain, nausea, and vomiting  - Initial CT A/P: acute cholecystitis with 1.2 cm common bile duct dilatation  - per surgery, not a surgical candidate given comorbidities   - s/p ERCP 1/20 : choledocholithiasis w/ large stone w/ sphincterotomy and biliary stent placement.   - patient now with recurrent abdominal pain, and leukocytosis, repeat CT A/P and MRI non con concerning for multiloculated fluid collection around the neck of the gallbladder, possible perf    Plan:  > s/p IR placement of perc rissa  > initial bilious fluid cultures growing gram positive rods, f/u speciation  > continue with zosyn 7 days after source control (1/25 - )   > pain/nausea control with tylenol and maalox  > lipase WNL; minimal c/f post ERCP pancreatitis  > AVOID zofran given QTc 518  > trend CMP, coags, otherwise stable  > Patient will need repeat ERCP in 2-3 months for stent removal Patient presenting with 1 week of abdominal pain, nausea, and vomiting  - Initial CT A/P: acute cholecystitis with 1.2 cm common bile duct dilatation  - per surgery, not a surgical candidate given comorbidities   - s/p ERCP 1/20 : choledocholithiasis w/ large stone w/ sphincterotomy and biliary stent placement.   - patient now with recurrent abdominal pain, and leukocytosis, repeat CT A/P and MRI non con concerning for multiloculated fluid collection around the neck of the gallbladder, possible perf    Plan:  > s/p IR placement of perc rissa  > initial bilious fluid cultures growing gram positive rods and few yeasts  > continue with zosyn 7 days after source control (1/25 - )   > f/u ID recs re coverage of gram + rods and yeast  > pain/nausea control with tylenol and maalox  > Patient will need repeat ERCP in 2-3 months for stent removal

## 2023-01-27 NOTE — CONSULT NOTE ADULT - SUBJECTIVE AND OBJECTIVE BOX
date of consult 23    HISTORY OF PRESENT ILLNESS: HPI:  Patient is a 80 year old F with a PMHx of HTN, HLD, T2DM, HFpEF (EF 65%), breast cancer s/p R partial mastectomy, rectal carcinoma s/p resection, CAD s/p CABG (2020) who presents for 1 day of progressively worsening abdominal pain associated with nausea and NBNB vomiting.  Found to have acute rissa, s/p ERCP and stent and Perc rissa drain placed.  EP called for 2 episodes of tachycardia causing RRT.  Pt received Adenosine 6mg x 1 each time which slowed HR down.  Per daughter who is translating at bedside, pt denies palps or chest pain during these episodes.       PAST MEDICAL & SURGICAL HISTORY:  Breast CA, right   s/p mastectomy ,IV Chemotherapy      HTN (hypertension)      Lymphedema of arm  right arm s/p mastectomy      Hyperlipidemia      Rectal cancer  s/p chemotherapy and  radiation 12 weeks 2015 -3/2015      Obese      Type II diabetes mellitus      CHF (congestive heart failure)      DVT of leg (deep venous thrombosis)  right calf DVT  2019 pt on Eliquis      Renal insufficiency      S/P mastectomy, right        History of appendectomy        S/P ileostomy  low anterior resection-8/10/15, reversal of ileostomy       S/P colon resection  2015      S/P TKR (total knee replacement), right  2017    MEDICATIONS  (STANDING):  aspirin  chewable 81 milliGRAM(s) Oral daily  atorvastatin 40 milliGRAM(s) Oral at bedtime  chlorhexidine 4% Liquid 1 Application(s) Topical <User Schedule>  gabapentin 200 milliGRAM(s) Oral three times a day  glucagon  Injectable 1 milliGRAM(s) IntraMuscular once  heparin   Injectable 5000 Unit(s) SubCutaneous every 8 hours  indomethacin Suppository 100 milliGRAM(s) Rectal once  insulin glargine Injectable (LANTUS) 15 Unit(s) SubCutaneous at bedtime  insulin lispro (ADMELOG) corrective regimen sliding scale   SubCutaneous three times a day before meals  metoprolol tartrate 50 milliGRAM(s) Oral every 6 hours  piperacillin/tazobactam IVPB.. 3.375 Gram(s) IV Intermittent every 12 hours      Allergies  CT scan dye (Hives)  penicillin (Unknown)      FAMILY HISTORY:  Family history of diabetes mellitus in mother    Family history of diabetes mellitus in son    Family history of heart attack (Child)    Non-contributary for premature coronary disease or sudden cardiac death    SOCIAL HISTORY:    [ x] Non-smoker  [ ] Smoker  [ ] Alcohol    FLU VACCINE THIS YEAR STARTS IN AUGUST:  [ ] Yes    [ ] No    IF OVER 65 HAVE YOU EVER HAD A PNA VACCINE:  [ ] Yes    [ ] No       [ ] N/A      REVIEW OF SYSTEMS:  [ ]chest pain  [  ]shortness of breath  [  ]palpitations  [  ]syncope  [ ]near syncope [ ]upper extremity weakness   [ ] lower extremity weakness  [  ]diplopia  [  ]altered mental status   [  ]fevers  [ ]chills [ ]nausea  [ ]vomiting  [  ]dysphagia    [ ]abdominal pain  [ ]melena  [ ]BRBPR    [  ]epistaxis  [  ]rash    [ ]lower extremity edema        [x ] All others negative	  [ ] Unable to obtain      LABS:	 	                        10.9   11.55 )-----------( 248      ( 2023 07:14 )             35.6       135  |  99  |  52<H>  ----------------------------<  236<H>  4.4   |  17<L>  |  3.82<H>    Ca    8.6      2023 07:29  Phos  3.6       Mg     2.1         TPro  6.3  /  Alb  2.6<L>  /  TBili  0.2  /  DBili  x   /  AST  19  /  ALT  10  /  AlkPhos  124<H>      Creatinine Trend: 3.82<--, 3.79<--, 4.09<--, 3.80<--, 3.78<--, 3.18<--      PHYSICAL EXAM:  T(C): 36.7 (23 @ 14:50), Max: 37 (23 @ 13:47)  HR: 79 (23 @ 15:45) (70 - 136)  BP: 116/76 (23 @ 15:45) (98/64 - 142/72)  RR: 18 (23 @ 15:45) (16 - 20)  SpO2: 95% (23 @ 15:45) (85% - 97%)    Gen: Appears fatigued but A&O x 3  HEENT:  (-)icterus (-)pallor  CV: N S1 S2 1/6 JAKY (+)2 Pulses B/l  Resp:  Clear to ausculatation B/L, normal effort  GI: (+) BS Soft, NT, ND  Lymph:  (-)Edema, (-)obvious lymphadenopathy  Skin: Warm to touch, Normal turgor  Psych: Appropriate mood and affect    TELEMETRY: 	SR, Atrial tachycardia 130s      EC. NSR    2. Atrial Tachycardia     < from: TTE with Doppler (w/Cont) (22 @ 09:49) >  Conclusions:  1. Mild mitral annular calcification. Mitral annular  dilatation with normal leaflet opening. Mild mitral  regurgitation.  2. Aortic valve not well visualized; appears calcified.  Minimal aortic regurgitation.  3. Endocardial visualization enhanced with intravenous  injection of Ultrasonic Enhancing Agent (Definity). Left  ventricle suboptimally visualized despite intravenous  ultrasonic enhancing agent; the apex is foreshortened on  apical images. Overall normal left ventricular systolic  function.  4. Increased E/e' suggestive of elevated left ventricular  filling pressure.  5. The right ventricle is not well visualized; grossly  normal right ventricular size and systolic function.  *** Compared with echocardiogram of 2022, increased  E/e' suggestive of increased LV filling pressure is noted  on today's study.  ------------------------------------------------------------------------  Confirmed on  2022 - 12:08:11 by Bridgett Edmond M.D.    < end of copied text >        ASSESSMENT/PLAN: 	Patient is a 80 year old F with a PMHx of HTN, HLD, T2DM, HFpEF (EF 65%), breast cancer s/p R partial mastectomy, rectal carcinoma s/p resection, CAD s/p CABG (2020) who presents for 1 day of progressively worsening abdominal pain associated with nausea and NBNB vomiting.  Found to have acute rissa, s/p ERCP and stent and Perc rissa drain placed.  EP called for 2 episodes of tachycardia causing RRT.  Pt received Adenosine 6mg x 1 each time which slowed HR down.    1. Tachycardia  --strips and EKG reviewed with attending and they are c/w 2:1 atrial tachycardia  --cont PO metoprolol if tolerating  --cont telemetry

## 2023-01-27 NOTE — PROGRESS NOTE ADULT - PROBLEM SELECTOR PLAN 3
Patient presenting with mild DKA given BHB of 2 and serum glucose of 450  - resolved, or likely was due to starvation ketosis given AG closed, pH normal, HCO3 normal, glucose improving  - adequate response to 4U regular insulin  - patient on oral agents: repaglinide and semaglutide  - upon last admission 8/2022, patient was well controlled on lantus 16U QHS and admelog 7U TID  - A1c stable at 9.6    Plan:  > continue with lantus 10U QHS  > moderate dose correctional sliding scale with meals while eating  >monitor FS and add sliding scale as needed Patient with episode of SVT to 140s, though remained hemodynamically stable 1/26 PM  - vagal manuevers did not break arrhythmia  - required RRT --> adenosine 6  - possibly reactive iso infection, also missed AM toprol dose  - may have been component of volume overload given pt was on gentle hydration    Plan:  > toprol 150 qd switched to lopressor 75 BID to ensure patient receives dose  > hold parameters liberalized to SBP > 90  > continue to monitor on telemetry

## 2023-01-27 NOTE — PROGRESS NOTE ADULT - SUBJECTIVE AND OBJECTIVE BOX
PROGRESS NOTE:     Patient is a 82y old  Female who presents with a chief complaint of acute cholecystitis, DKA (2023 07:25)      ---------------------------------------------------  Dmitry Dangelo  PGY-1, Internal Medicine  Available on [employer] Teams  Pager: [phone] ([facility]), or [phone] ([facility])  ---------------------------------------------------    SUBJECTIVE / OVERNIGHT EVENTS:    No acute events overnight. Patient examined at bedside with no acute complaints.     Pain:  Bowel Movements:  Urination:  OOB:  PT:    REVIEW OF SYSTEMS:    CONSTITUTIONAL: No weakness, fevers or chills  EYES/ENT: No visual changes;  No vertigo or throat pain   NECK: No pain or stiffness  RESPIRATORY: No cough, wheezing, hemoptysis; No shortness of breath  CARDIOVASCULAR: No chest pain or palpitations  GASTROINTESTINAL: No abdominal or epigastric pain. No nausea, vomiting, or hematemesis; No diarrhea or constipation. No melena or hematochezia.  GENITOURINARY: No dysuria, frequency or hematuria  NEUROLOGICAL: No numbness or weakness  SKIN: No itching, rashes      MEDICATIONS  (STANDING):  aspirin  chewable 81 milliGRAM(s) Oral daily  atorvastatin 40 milliGRAM(s) Oral at bedtime  chlorhexidine 4% Liquid 1 Application(s) Topical <User Schedule>  dextrose 5%. 1000 milliLiter(s) (100 mL/Hr) IV Continuous <Continuous>  dextrose 5%. 1000 milliLiter(s) (50 mL/Hr) IV Continuous <Continuous>  dextrose 50% Injectable 25 Gram(s) IV Push once  dextrose 50% Injectable 12.5 Gram(s) IV Push once  dextrose 50% Injectable 25 Gram(s) IV Push once  gabapentin 200 milliGRAM(s) Oral three times a day  glucagon  Injectable 1 milliGRAM(s) IntraMuscular once  heparin   Injectable 5000 Unit(s) SubCutaneous every 8 hours  indomethacin Suppository 100 milliGRAM(s) Rectal once  insulin glargine Injectable (LANTUS) 15 Unit(s) SubCutaneous at bedtime  insulin lispro (ADMELOG) corrective regimen sliding scale   SubCutaneous three times a day before meals  metoprolol tartrate 75 milliGRAM(s) Oral two times a day  piperacillin/tazobactam IVPB.. 3.375 Gram(s) IV Intermittent every 12 hours    MEDICATIONS  (PRN):  acetaminophen     Tablet .. 650 milliGRAM(s) Oral every 6 hours PRN Temp greater or equal to 38C (100.4F), Mild Pain (1 - 3), Moderate Pain (4 - 6)  albuterol/ipratropium for Nebulization 3 milliLiter(s) Nebulizer every 6 hours PRN Shortness of Breath and/or Wheezing  aluminum hydroxide/magnesium hydroxide/simethicone Suspension 30 milliLiter(s) Oral every 6 hours PRN Dyspepsia  dextrose Oral Gel 15 Gram(s) Oral once PRN Blood Glucose LESS THAN 70 milliGRAM(s)/deciliter  fentaNYL    Injectable 25 MICROGram(s) IV Push every 5 minutes PRN Moderate Pain (4 - 6)  sodium chloride 0.9% lock flush 10 milliLiter(s) IV Push every 1 hour PRN Pre/post blood products, medications, blood draw, and to maintain line patency      CAPILLARY BLOOD GLUCOSE      POCT Blood Glucose.: 233 mg/dL (2023 22:09)  POCT Blood Glucose.: 253 mg/dL (2023 18:42)  POCT Blood Glucose.: 235 mg/dL (2023 17:15)  POCT Blood Glucose.: 200 mg/dL (2023 12:00)  POCT Blood Glucose.: 191 mg/dL (2023 08:51)    I&O's Summary    2023 07:01  -  2023 07:00  --------------------------------------------------------  IN: 1060 mL / OUT: 825 mL / NET: 235 mL        VITALS:   T(C): 36.6 (23 @ 05:00), Max: 36.9 (23 @ 13:09)  HR: 91 (23 @ 05:00) (74 - 136)  BP: 116/70 (23 @ 05:00) (103/65 - 142/72)  RR: 18 (23 @ 05:00) (16 - 18)  SpO2: 96% (23 @ 05:00) (95% - 97%)    GENERAL: NAD, lying in bed comfortably  HEAD:  Atraumatic, normocephalic  EYES: EOMI, PERRLA, conjunctiva and sclera clear  ENT: Moist mucous membranes  NECK: Supple, no JVD  HEART: Regular rate and rhythm, no murmurs, rubs, or gallops  LUNGS: Unlabored respirations.  Clear to auscultation bilaterally, no crackles, wheezing, or rhonchi  ABDOMEN: Soft, nontender, nondistended, +BS  EXTREMITIES: 2+ peripheral pulses bilaterally. No clubbing, cyanosis, or edema  NERVOUS SYSTEM:  A&Ox3, no focal deficits   SKIN: No rashes or lesions    LABS:                        10.4   13.71 )-----------( 271      ( 2023 14:13 )             33.5         133<L>  |  98  |  53<H>  ----------------------------<  222<H>  3.5   |  20<L>  |  3.79<H>    Ca    8.8      2023 14:13  Phos  4.0       Mg     1.9         TPro  6.5  /  Alb  2.7<L>  /  TBili  0.3  /  DBili  x   /  AST  11  /  ALT  10  /  AlkPhos  117        CARDIAC MARKERS ( 2023 14:13 )  x     / x     / 26 U/L / x     / 1.0 ng/mL      Urinalysis Basic - ( 2023 12:41 )    Color: Yellow / Appearance: Slightly Turbid / S.016 / pH: x  Gluc: x / Ketone: Trace  / Bili: Negative / Urobili: Negative   Blood: x / Protein: 30 mg/dL / Nitrite: Negative   Leuk Esterase: Small / RBC: 87 /hpf / WBC 13 /HPF   Sq Epi: x / Non Sq Epi: 3 /hpf / Bacteria: Negative        Culture - Body Fluid with Gram Stain (collected 2023 15:34)  Source: Abdominal Fl Abdominal Fluid  Gram Stain (2023 23:26):    No polymorphonuclear cells seen per low power field    Few Gram Positive Rods per oil power field  Preliminary Report (2023 17:46):    Rare Yeast like cells Identification to follow.        RADIOLOGY & ADDITIONAL TESTS:  Results Reviewed:   Imaging Personally Reviewed:  Electrocardiogram Personally Reviewed:    COORDINATION OF CARE:  Care Discussed with Consultants/Other Providers [Y/N]:  Prior or Outpatient Records Reviewed [Y/N]:     PROGRESS NOTE:     Patient is a 82y old  Female who presents with a chief complaint of acute cholecystitis, DKA (2023 07:25)      ---------------------------------------------------  Dmitry Dangelo  PGY-1, Internal Medicine  Available on [employer] Teams  Pager: [phone] ([facility]), or [phone] ([facility])  ---------------------------------------------------    SUBJECTIVE / OVERNIGHT EVENTS:    Yesterday evening patient had asymptomatic SVT to 140s that did not break with vagal maneuvers Patient had RRT called for adenosine administration, converted to sinus rhythm s/p 6 adenosine IV. Patient's Toprol  switched to 75 lopressor BID since patient wasn't consistently getting the Toprol due to hold parameters and relative hypotension. Patient also received fluconazole 400 to cover for yeast found in abdominal fluid culture. Patient remained in sinus rhythm overnight rate of 70-80s.     Patient examined at bedside ([place]  Personal [employer]inder ID [id number]) with no acute complaints. Reports that she feels well, denies any furhter chest discomfort or abdominal pain both of which she was experiencing yesterday. Also denies nausea and vomiting. Per RN, she has been able to tolerate small amounts of her regular diet.       MEDICATIONS  (STANDING):  aspirin  chewable 81 milliGRAM(s) Oral daily  atorvastatin 40 milliGRAM(s) Oral at bedtime  chlorhexidine 4% Liquid 1 Application(s) Topical <User Schedule>  dextrose 5%. 1000 milliLiter(s) (100 mL/Hr) IV Continuous <Continuous>  dextrose 5%. 1000 milliLiter(s) (50 mL/Hr) IV Continuous <Continuous>  dextrose 50% Injectable 25 Gram(s) IV Push once  dextrose 50% Injectable 12.5 Gram(s) IV Push once  dextrose 50% Injectable 25 Gram(s) IV Push once  gabapentin 200 milliGRAM(s) Oral three times a day  glucagon  Injectable 1 milliGRAM(s) IntraMuscular once  heparin   Injectable 5000 Unit(s) SubCutaneous every 8 hours  indomethacin Suppository 100 milliGRAM(s) Rectal once  insulin glargine Injectable (LANTUS) 15 Unit(s) SubCutaneous at bedtime  insulin lispro (ADMELOG) corrective regimen sliding scale   SubCutaneous three times a day before meals  metoprolol tartrate 75 milliGRAM(s) Oral two times a day  piperacillin/tazobactam IVPB.. 3.375 Gram(s) IV Intermittent every 12 hours    MEDICATIONS  (PRN):  acetaminophen     Tablet .. 650 milliGRAM(s) Oral every 6 hours PRN Temp greater or equal to 38C (100.4F), Mild Pain (1 - 3), Moderate Pain (4 - 6)  albuterol/ipratropium for Nebulization 3 milliLiter(s) Nebulizer every 6 hours PRN Shortness of Breath and/or Wheezing  aluminum hydroxide/magnesium hydroxide/simethicone Suspension 30 milliLiter(s) Oral every 6 hours PRN Dyspepsia  dextrose Oral Gel 15 Gram(s) Oral once PRN Blood Glucose LESS THAN 70 milliGRAM(s)/deciliter  fentaNYL    Injectable 25 MICROGram(s) IV Push every 5 minutes PRN Moderate Pain (4 - 6)  sodium chloride 0.9% lock flush 10 milliLiter(s) IV Push every 1 hour PRN Pre/post blood products, medications, blood draw, and to maintain line patency      CAPILLARY BLOOD GLUCOSE      POCT Blood Glucose.: 233 mg/dL (2023 22:09)  POCT Blood Glucose.: 253 mg/dL (2023 18:42)  POCT Blood Glucose.: 235 mg/dL (2023 17:15)  POCT Blood Glucose.: 200 mg/dL (2023 12:00)  POCT Blood Glucose.: 191 mg/dL (2023 08:51)    I&O's Summary    2023 07:01  -  2023 07:00  --------------------------------------------------------  IN: 1060 mL / OUT: 825 mL / NET: 235 mL        VITALS:   T(C): 36.6 (23 @ 05:00), Max: 36.9 (23 @ 13:09)  HR: 91 (23 @ 05:00) (74 - 136)  BP: 116/70 (23 @ 05:00) (103/65 - 142/72)  RR: 18 (23 @ 05:00) (16 - 18)  SpO2: 96% (23 @ 05:00) (95% - 97%)    GENERAL: Older woman in NAD, lying in bed comfortably  ENT: Moist mucous membranes  NECK: Supple   CHEST/LUNG: End expiratory wheezing b/l  HEART: Regular rate and rhythm; No murmurs, rubs, or gallops  ABDOMEN: mild RUQ tenderness TTP, cholecystostomy tube with approx 250 cc don bilious drainage, tube insertion site c/d/i   EXTREMITIES: +bilateral UE lymphedema R > L, 1+ bilateral LE pitting edema, 2+ peripheral pulses bilaterally  NERVOUS SYSTEM:  Alert & Oriented X3, speech clear. No deficits   MSK: FROM all 4 extremities, full and equal strength    LABS:                        10.4   13.71 )-----------( 271      ( 2023 14:13 )             33.5         133<L>  |  98  |  53<H>  ----------------------------<  222<H>  3.5   |  20<L>  |  3.79<H>    Ca    8.8      2023 14:13  Phos  4.0       Mg     1.9         TPro  6.5  /  Alb  2.7<L>  /  TBili  0.3  /  DBili  x   /  AST  11  /  ALT  10  /  AlkPhos  117        CARDIAC MARKERS ( 2023 14:13 )  x     / x     / 26 U/L / x     / 1.0 ng/mL      Urinalysis Basic - ( 2023 12:41 )    Color: Yellow / Appearance: Slightly Turbid / S.016 / pH: x  Gluc: x / Ketone: Trace  / Bili: Negative / Urobili: Negative   Blood: x / Protein: 30 mg/dL / Nitrite: Negative   Leuk Esterase: Small / RBC: 87 /hpf / WBC 13 /HPF   Sq Epi: x / Non Sq Epi: 3 /hpf / Bacteria: Negative      Culture - Body Fluid with Gram Stain (collected 2023 15:34)  Source: Abdominal Fl Abdominal Fluid  Gram Stain (2023 23:26):    No polymorphonuclear cells seen per low power field    Few Gram Positive Rods per oil power field  Preliminary Report (2023 17:46):    Rare Yeast like cells Identification to follow.

## 2023-01-27 NOTE — PROGRESS NOTE ADULT - PROBLEM SELECTOR PLAN 4
Patient with HFpEF, follows with Dr. Hagan   - recent admission 8/2022 for ADHF and fluid overload  -  upon dc, 602 upon admission  - TTE 8/2022: EF 62%, normal LV/RV systolic function, elevated LV filling pressures, mild MR    Plan:  > strict home parameters given tenuous volume status and intermittent hypotension  > hold bumex given worsening TAO, will reassess tomorrow Patient presenting with mild DKA given BHB of 2 and serum glucose of 450  - resolved, or likely was due to starvation ketosis given AG closed, pH normal, HCO3 normal, glucose improving  - patient on oral agents: repaglinide and semaglutide  - upon last admission 8/2022, patient was well controlled on lantus 16U QHS and admelog 7U TID  - A1c stable at 9.6  - FS elevated persistently to 200s now that patient has been tolerating a diet    Plan:  > inc lantus to 15U QHS  > moderate dose correctional sliding scale with meals while eating

## 2023-01-27 NOTE — PROGRESS NOTE ADULT - PROBLEM SELECTOR PLAN 5
Continue home meds atorvastatin 40, asa 81 Patient with HFpEF, follows with Dr. Hagan   - recent admission 8/2022 for ADHF and fluid overload  -  upon dc, 602 upon admission  - TTE 8/2022: EF 62%, normal LV/RV systolic function, elevated LV filling pressures, mild MR    Plan:  > strict home parameters given tenuous volume status and intermittent hypotension  > hold bumex given worsening TAO, will reassess tomorrow

## 2023-01-27 NOTE — CONSULT NOTE ADULT - ATTENDING COMMENTS
Impression:    #1.  Right-sided abdominal pain and tenderness associated with nausea and vomiting.    #2.  Choledocholithiasis on noncontrast CT scan.  LFTs normal.    #3.  Severe hyperglycemia with glucose greater than 400    #4.  Coronary artery disease status post CABG. recent hospitalization for congestive heart failure exacerbation.    #5.  DVT of right leg diagnosed in 2019, on chronic Eliquis anticoagulation    Recommendations:    #1.  Follow CBC/CMP    #2.  Glucose control per primary team    #3.  Antibiotics are reasonable    #4.  Cardiology consultation for restratification and optimization for general anesthesia    #5.  Eventual ERCP, not currently scheduled pending above.  May be as early as Friday depending on clinical course.
date of service 1/18/23    pt seen and examined  pt chart and imaging reviewed in detail  agree with note above  82F with RUQ pain c/s acute cholecystitis and choledocolithiasis with significant co-morbidities including CAD, s/p CABG, CHF, poorly controlled DM  pt is poor operative candidate  agree with admit to medicine  resting in bed, NAD  anicteric  softly distended, +RUQ TTP no r/g  cont npo, ivf , iv abx  serial abd exams  f/u labs in am  f/u IR evaluation for possible perc cholecystostomy drainage  f/u GI for possible EUS/ERCP  f/u cards for risk stratification  f/u endo for better FS control  cont supportive care per med  will follow with you  d/w pt & family @ bedside in detail
82 year old female with HTN, HLD, DM2, HFpEF, breast cancer s/p R partial mastectomy, rectal carcinoma s/p resection, CAD/ CABG presenting with abd pain, nausea and vomiting, found to have acute cholecystitis with choledocholithiasis. Underwent ERCP with biliary stent placement with repeat CT A/P with new fluid collection near the GB neck s/p perc rissa on 1/25 - culture with yeast. Leukocytosis better.    Recommend:  #Acute cholecystitis with choledocholithiasis/ abscess  -Continue zosyn, patient tolerating  -Add caspofungin given yeast in culture  -F/U ID and sensitivity of the yeast  -Trend WBC  -Monitor for fevers.    Dk Fonseca MD  Available through [employer] Teams  If no response, or after 5pm/weekends, call [phone]

## 2023-01-27 NOTE — PROGRESS NOTE ADULT - ATTENDING COMMENTS
82F with h/o HTN, T2DM, HFpEF (EF 65%, normal LV in 2022), breast cancer s/p R partial mastectomy, rectal carcinoma s/p resection, CAD s/p CABG who presents for 1 week of intermittent abdominal pain associated with nausea and vomiting.    1. Acute cholecystitis with choledocholithiasis   2. Uncontrolled T2DM with hyperglycemia  3. TAO on CKD 3, pre-renal  4. Chronic hydronephrosis  5. HTN  6. h/o CABG    - s/p ERCP by GI on 1/20:  large CBD stones on fluoro and relatively small sized ampulla; +small periampullary diverticula; s/p sphincterotomy + plastic CBD stent placement with successful drainage of bile/sludge. patient will need  repeat ERCP in 2-3 months.  - Abd pain after procedure w/ fever - CT suggestive of fluid collection and MRI with ultiloculated fluid collection adjacent to the gallbladder   - s/p perc rissa 1/25. drain culture growing some yeast. On zosyn  - ID consult - gave x1 fluconazole yesterday  - Cr stable today - continue to hold diuresis   - Episode of SVT that broke with adenosine - titrate metop to 50 q6, EP consult   - Resume eliquis if no procedure indicated for SVT    Rest as above.

## 2023-01-27 NOTE — PROGRESS NOTE ADULT - PROBLEM SELECTOR PLAN 2
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

## 2023-01-27 NOTE — PROGRESS NOTE ADULT - ATTENDING COMMENTS
DATE OF SERVICE: 01-27-23 @ 11:13    Overnight events noted. Patient without pain today. IR drain draining dark bilious output  Afebrile  WBC 11 today  Abdomen soft NT/ND    Fu final cx from IR drainage  Patient appears to be improving after percutaneous drain placement  No plan for surgical intervention at this time, she remains a high risk candidate and is improved after perc rissa. Would continue to monitor at this time  Surgery to sign off, please recall as needed

## 2023-01-27 NOTE — PROGRESS NOTE ADULT - ASSESSMENT
Assessment: 82F with RUQ pain c/s acute cholecystitis and choledocholithiasis with significant co-morbidities including CAD, s/p CABG, CHF, poorly controlled DM, s/p ERCP (1/20). Doing ok, still poor operative candidate. Imaging reviewed and unable to define source of new collection vs inflammatory changes. s/p perc rissa by IR on 1/25.    Plan:  - s/p perc rissa tube by IR on 1/25, tube w/ bilious output   - continue IVF + IV abx  - continue supportive care  - pain control PRN    [facility]  [id number]

## 2023-01-27 NOTE — CONSULT NOTE ADULT - ASSESSMENT
WORK UP      ANTIBIOTIC      DIAGNOSIS and IMPRESSION        RECOMMENDATIONS        PT TO BE SEEN. PRELIM NOTE  PENDING RECS. PLEASE WAIT FOR FINAL RECS AFTER DISCUSSION WITH ATTENDING#    Radames Perez DO, PGY-4   ID fellow  [employer] Preferred  After 5pm/weekends call [phone]   82F with HTN, HLD, T2DM, HFpEF (EF 65% X/2022), breast cancer s/p R partial mastectomy, rectal carcinoma s/p resection, CAD s/p CABG presents (1/18) with 1 week of intermittent abdominal pain associated with nausea and vomiting, found to have acute cholecystitis with choledocholithiasis s/p ERCP with biliary stent placement (1/20), complicated by worsening leukocytosis and abdominal pain, repeat imaging found new fluid collection adjacent to the gallbladder neck, now s/p percutaneous cholecystostomy (1/25). ID consulted for yeast in the abdominal fluid culture.    WORK UP  WBC 11 < 13  Cr 3.8  CXR with Pulmonary edema  CT A/P (1/18): acute cholecystitis with 1.2 cm common bile duct dilatation  ERCP 1/20 : choledocholithiasis w/ large stone w/ sphincterotomy and biliary stent placement  repeat CT A/P and MRI non con (1/23) concerning for multiloculated fluid collection around the neck of the gallbladder, possible perforation   UA grossly negative  BCx (1/20 - 1/22) NGTD  RVP negative  Abd fluid (1/25) Culture: rare yeast-like cells    ANTIBIOTIC  s/p Fluconazole 400mg (1/26) x1  piperacillin/tazobactam IVPB.. 3.375 every 12 hours (1/18 --- )    DIAGNOSIS and IMPRESSION  #Acute Cholecystitis with choledocholiasis complicated by Abscess formation  #s/p CB Stent placement (1/20)   #s/p percutaneous cholecystostomy placement (1/25)  #Yeast-like Cells on Abdominal Fluid Culture (Rare)     - reports abdominal pain is improved after s/p percutaneous cholecystostomy  - Denies any fever, chills. No dysuria, cough, diarrhea.     RECOMMENDATIONS  - continue with zosyn  - start Fluconazole 400mg q24hr  - monitor fever  - trend WBC  - follow up abdominal fluid culture       PT TO BE SEEN. PRELIM NOTE  PENDING RECS. PLEASE WAIT FOR FINAL RECS AFTER DISCUSSION WITH ATTENDINGKong Perez DO, PGY-4   ID fellow  [employer] Teams Preferred  After 5pm/weekends call [phone]   82F with HTN, HLD, T2DM, HFpEF (EF 65% X/2022), breast cancer s/p R partial mastectomy, rectal carcinoma s/p resection, CAD s/p CABG presents (1/18) with 1 week of intermittent abdominal pain associated with nausea and vomiting, found to have acute cholecystitis with choledocholithiasis s/p ERCP with biliary stent placement (1/20), complicated by worsening leukocytosis and abdominal pain, repeat imaging found new fluid collection adjacent to the gallbladder neck, now s/p percutaneous cholecystostomy (1/25). ID consulted for yeast in the abdominal fluid culture.    WORK UP  WBC 11 < 13  Cr 3.8  CXR with Pulmonary edema  CT A/P (1/18): acute cholecystitis with 1.2 cm common bile duct dilatation  ERCP 1/20 : choledocholithiasis w/ large stone w/ sphincterotomy and biliary stent placement  repeat CT A/P and MRI non con (1/23) concerning for multiloculated fluid collection around the neck of the gallbladder, possible perforation   UA grossly negative  BCx (1/20 - 1/22) NGTD  RVP negative  Abd fluid (1/25) Culture: rare yeast-like cells    ANTIBIOTIC  s/p Fluconazole 400mg (1/26) x1  piperacillin/tazobactam IVPB.. 3.375 every 12 hours (1/18 --- )    DIAGNOSIS and IMPRESSION  #Acute Cholecystitis with choledocholiasis complicated by Abscess formation  #s/p CB Stent placement (1/20)   #s/p percutaneous cholecystostomy placement (1/25)  #Yeast-like Cells on Abdominal Fluid Culture (Rare)     - reports abdominal pain is improved after s/p percutaneous cholecystostomy  - Denies any fever, chills. No dysuria, cough, diarrhea.     RECOMMENDATIONS  - continue with zosyn  - start Fluconazole 400mg q24hr  - obtain EKG to monitor QTc  - monitor fever  - trend WBC  - follow up abdominal fluid culture       PT TO BE SEEN. PRELIM NOTE  PENDING RECS. PLEASE WAIT FOR FINAL RECS AFTER DISCUSSION WITH ATTENDINGKong Perez DO, PGY-4   ID fellow  [employer] Teams Preferred  After 5pm/weekends call [phone]   82F with HTN, HLD, T2DM, HFpEF (EF 65% X/2022), breast cancer s/p R partial mastectomy, rectal carcinoma s/p resection, CAD s/p CABG presents (1/18) with 1 week of intermittent abdominal pain associated with nausea and vomiting, found to have acute cholecystitis with choledocholithiasis s/p ERCP with biliary stent placement (1/20), complicated by worsening leukocytosis and abdominal pain, repeat imaging found new fluid collection adjacent to the gallbladder neck, now s/p percutaneous cholecystostomy (1/25). ID consulted for yeast in the abdominal fluid culture.    WORK UP  WBC 11 < 13  Cr 3.8  CXR with Pulmonary edema  CT A/P (1/18): acute cholecystitis with 1.2 cm common bile duct dilatation  ERCP 1/20 : choledocholithiasis w/ large stone w/ sphincterotomy and biliary stent placement  repeat CT A/P and MRI non con (1/23) concerning for multiloculated fluid collection around the neck of the gallbladder, possible perforation   UA grossly negative  BCx (1/20 - 1/22) NGTD  RVP negative  Abd fluid (1/25) Culture: rare yeast-like cells    ANTIBIOTIC  s/p Fluconazole 400mg (1/26) x1  piperacillin/tazobactam IVPB.. 3.375 every 12 hours (1/18 --- )    DIAGNOSIS and IMPRESSION  #Acute Cholecystitis with choledocholiasis complicated by Abscess formation  #s/p CB Stent placement (1/20)   #s/p percutaneous cholecystostomy placement (1/25)  #Yeast-like Cells on Abdominal Fluid Culture (Rare)     - reports abdominal pain is improved after s/p percutaneous cholecystostomy  - Denies any fever, chills. No dysuria, cough, diarrhea.     RECOMMENDATIONS  - continue with zosyn  - start Fluconazole 400mg q24hr  - obtain EKG to monitor QTc  - monitor fever  - trend WBC  - follow up abdominal fluid culture       PT TO BE SEEN. PRELIM NOTE  PENDING RECS. PLEASE WAIT FOR FINAL RECS AFTER DISCUSSION WITH ATTENDINGKong Perez DO, PGY-4   ID fellow  [employer] Teams Preferred  After 5pm/weekends call [phone]   82F with HTN, HLD, T2DM, HFpEF (EF 65% X/2022), breast cancer s/p R partial mastectomy, rectal carcinoma s/p resection, CAD s/p CABG presents (1/18) with 1 week of intermittent abdominal pain associated with nausea and vomiting, found to have acute cholecystitis with choledocholithiasis s/p ERCP with biliary stent placement (1/20), complicated by worsening leukocytosis and abdominal pain, repeat imaging found new fluid collection adjacent to the gallbladder neck, now s/p percutaneous cholecystostomy (1/25). ID consulted for yeast in the abdominal fluid culture.    WORK UP  WBC 11 < 13  Cr 3.8  CXR with Pulmonary edema  CT A/P (1/18): acute cholecystitis with 1.2 cm common bile duct dilatation  ERCP 1/20 : choledocholithiasis w/ large stone w/ sphincterotomy and biliary stent placement  repeat CT A/P and MRI non con (1/23) concerning for multiloculated fluid collection around the neck of the gallbladder, possible perforation   UA grossly negative  BCx (1/20 - 1/22) NGTD  RVP negative  Abd fluid (1/25) Culture: rare yeast-like cells    ANTIBIOTIC  s/p Fluconazole 400mg (1/26) x1  piperacillin/tazobactam IVPB.. 3.375 every 12 hours (1/18 --- )    DIAGNOSIS and IMPRESSION  #Acute Cholecystitis with choledocholiasis complicated by Abscess formation  #s/p CB Stent placement (1/20)   #s/p percutaneous cholecystostomy placement (1/25)  #Yeast-like Cells on Abdominal Fluid Culture (Rare)     - reports abdominal pain is improved after s/p percutaneous cholecystostomy  - Denies any fever, chills. No dysuria, cough, diarrhea.     RECOMMENDATIONS  - continue with zosyn  - start Caspo IV 70mg today, followed by 50mg daily tomorrow   - obtain EKG to monitor QTc  - monitor fever  - trend WBC  - follow up abdominal fluid culture     Case d/w attending and primary team      Radames Perez DO, PGY-4   ID fellow  [employer] Preferred  After 5pm/weekends call [phone]

## 2023-01-27 NOTE — PROGRESS NOTE ADULT - ASSESSMENT
Patient is a 82 year old F with a PMHx of HTN, HLD, T2DM, HFpEF (EF 65% X/2022), breast cancer s/p R partial mastectomy, rectal carcinoma s/p resection, CAD s/p CABG who presents for 1 week of intermittent abdominal pain associated with nausea and vomiting, found to have acute cholecystitis with choledocholithiasis. Patient initially underwent ERCP with CBD placement but was found to have a new fluid collection adjacent to the gallbladder neck, now s/p percutaneous cholecystostomy.

## 2023-01-27 NOTE — PROGRESS NOTE ADULT - ASSESSMENT
seen and examined at  the bedside ,         acetaminophen     Tablet .. 650 milliGRAM(s) Oral every 6 hours PRN  aluminum hydroxide/magnesium hydroxide/simethicone Suspension 30 milliLiter(s) Oral every 6 hours PRN  aspirin  chewable 81 milliGRAM(s) Oral daily  atorvastatin 40 milliGRAM(s) Oral at bedtime  chlorhexidine 4% Liquid 1 Application(s) Topical <User Schedule>  dextrose 5%. 1000 milliLiter(s) IV Continuous <Continuous>  dextrose 5%. 1000 milliLiter(s) IV Continuous <Continuous>  dextrose 50% Injectable 25 Gram(s) IV Push once  dextrose 50% Injectable 12.5 Gram(s) IV Push once  dextrose 50% Injectable 25 Gram(s) IV Push once  dextrose Oral Gel 15 Gram(s) Oral once PRN  gabapentin 200 milliGRAM(s) Oral three times a day  glucagon  Injectable 1 milliGRAM(s) IntraMuscular once  heparin   Injectable 5000 Unit(s) SubCutaneous every 8 hours  indomethacin Suppository 100 milliGRAM(s) Rectal once  insulin glargine Injectable (LANTUS) 10 Unit(s) SubCutaneous at bedtime  insulin lispro (ADMELOG) corrective regimen sliding scale   SubCutaneous Before meals and at bedtime  metoprolol succinate  milliGRAM(s) Oral daily  morphine  - Injectable 1 milliGRAM(s) IV Push every 6 hours PRN  piperacillin/tazobactam IVPB.. 3.375 Gram(s) IV Intermittent every 8 hours  sodium chloride 0.9% lock flush 10 milliLiter(s) IV Push every 1 hour PRN  spironolactone 12.5 milliGRAM(s) Oral daily      VITAL:  T(C): , Max: 37.3 (01-21-23 @ 13:21)  T(F): , Max: 99.1 (01-21-23 @ 13:21)  HR: 88 (01-21-23 @ 13:21)  BP: 100/60 (01-21-23 @ 13:21)  BP(mean): --  RR: 18 (01-21-23 @ 13:21)  SpO2: 96% (01-21-23 @ 13:21)  Wt(kg): --    01-20-23 @ 07:01  -  01-21-23 @ 07:00  --------------------------------------------------------  IN: 440 mL / OUT: 50 mL / NET: 390 mL    01-21-23 @ 07:01  -  01-21-23 @ 14:41  --------------------------------------------------------  IN: 850 mL / OUT: 250 mL / NET: 600 mL        PHYSICAL EXAM:  General: NAD; Alert  HEENT:  NCAT; PERRLA  Neck: No JVD; supple  Respiratory: CTA-b/l  Cardiac: RRR s1s2  Gastrointestinal: BS+, soft, NT/ND , biliary drain in placed   Urologic: No chen  Extremities: No peripheral edema  Access:     LABS:                          11.5   13.38 )-----------( 188      ( 21 Jan 2023 06:59 )             37.6     Na(137)/K(4.0)/Cl(100)/HCO3(26)/BUN(43)/Cr(2.67)Glu(151)/Ca(8.7)/Mg(1.9)/PO4(3.8)    01-21 @ 06:59  Na(138)/K(4.1)/Cl(101)/HCO3(26)/BUN(38)/Cr(2.44)Glu(145)/Ca(9.0)/Mg(1.9)/PO4(3.5)    01-20 @ 06:35  Na(139)/K(4.1)/Cl(100)/HCO3(26)/BUN(26)/Cr(1.62)Glu(203)/Ca(9.4)/Mg(--)/PO4(--)    01-19 @ 06:58  Na(136)/K(4.2)/Cl(98)/HCO3(24)/BUN(31)/Cr(1.58)Glu(323)/Ca(9.4)/Mg(--)/PO4(--)    01-18 @ 16:14      Sodium, Random Urine: 27 mmol/L (01-21 @ 06:50)  Osmolality, Random Urine: 368 mos/kg (01-21 @ 06:50)  Potassium, Random Urine: 53 mmol/L (01-21 @ 06:50)    IMAGES:  CONTRAST/COMPLICATIONS:  IV Contrast: NONE  Oral Contrast: NONE  Complications: None reported at time of study completion    PROCEDURE:  CT of the Abdomen and Pelvis was performed.  Sagittal and coronal reformats were performed.    FINDINGS:  LOWER CHEST: Small bilateral pleural effusions. Cardiomegaly and coronary   artery calcifications. Partially imaged sternotomy.    LIVER: Within normal limits.  BILE DUCTS: Biliary stent in place. No intrahepatic biliary ductal   dilatation. Persistent CBD stones adjacent to the stent.  GALLBLADDER: Cholelithiasis. Ill-defined collection in the region of the   gallbladder neck and common bile duct measuring approximately 4.6 x 3.0   cm also encompassing the secondportion of the duodenum.  SPLEEN: Within normal limits.  PANCREAS: Fatty atrophy.  ADRENALS: Within normal limits.  KIDNEYS/URETERS: Unchanged chronically hydronephrotic right kidney with   right renal atrophy.    BLADDER: Within normal limits.  REPRODUCTIVE ORGANS: Myomatous uterus.    BOWEL: Rectosigmoid and small bowel anastomoses. No bowel obstruction.  PERITONEUM: No ascites.  VESSELS: Atherosclerotic changes.  RETROPERITONEUM/LYMPH NODES: No lymphadenopathy.  ABDOMINAL WALL: Redemonstrated small to moderate fat-containing ventral   hernia.  BONES: Right total hip arthroplasty. Degenerative changes.    IMPRESSION:  New ill-defined collection in the region of the gallbladder neck and   common bile duct also encompassing the second portionof the duodenum.   Findings may be related to bile leak, perforated cholecystitis, or   duodenal hematoma. Recommend further evaluation with MRI.    Interval placement of a stent within the common bile duct with a residual   CBD stones adjacent to the stent.    --- End of Report ---            ASSESSMENT/PLAN  Patient is a 80 year old F with a PMHx of HTN, HLD, T2DM, HFpEF (EF 65% X/2022), breast cancer s/p R partial mastectomy, rectal carcinoma s/p resection, CAD s/p CABG (11/2020) who presents for 1 day of progressively worsening abdominal pain associated with nausea and NBNB vomiting. 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  daily bmp  urine lytes   2)CVS:  BP soft  . Toprol xl  with holding parameter   3) ID: on zosyn , consider switching  , abdominal fluid yeast growth seen ID on board.  4) GI  on board          [facility]  Office: [phone]     seen and examined at  the bedside ,   RRT due to tachycardia ,  responded to adenosine   now look stable       acetaminophen     Tablet .. 650 milliGRAM(s) Oral every 6 hours PRN  aluminum hydroxide/magnesium hydroxide/simethicone Suspension 30 milliLiter(s) Oral every 6 hours PRN  aspirin  chewable 81 milliGRAM(s) Oral daily  atorvastatin 40 milliGRAM(s) Oral at bedtime  chlorhexidine 4% Liquid 1 Application(s) Topical <User Schedule>  dextrose 5%. 1000 milliLiter(s) IV Continuous <Continuous>  dextrose 5%. 1000 milliLiter(s) IV Continuous <Continuous>  dextrose 50% Injectable 25 Gram(s) IV Push once  dextrose 50% Injectable 12.5 Gram(s) IV Push once  dextrose 50% Injectable 25 Gram(s) IV Push once  dextrose Oral Gel 15 Gram(s) Oral once PRN  gabapentin 200 milliGRAM(s) Oral three times a day  glucagon  Injectable 1 milliGRAM(s) IntraMuscular once  heparin   Injectable 5000 Unit(s) SubCutaneous every 8 hours  indomethacin Suppository 100 milliGRAM(s) Rectal once  insulin glargine Injectable (LANTUS) 10 Unit(s) SubCutaneous at bedtime  insulin lispro (ADMELOG) corrective regimen sliding scale   SubCutaneous Before meals and at bedtime  metoprolol succinate  milliGRAM(s) Oral daily  morphine  - Injectable 1 milliGRAM(s) IV Push every 6 hours PRN  piperacillin/tazobactam IVPB.. 3.375 Gram(s) IV Intermittent every 8 hours  sodium chloride 0.9% lock flush 10 milliLiter(s) IV Push every 1 hour PRN  spironolactone 12.5 milliGRAM(s) Oral daily      VITAL:  T(C): , Max: 37.3 (01-21-23 @ 13:21)  T(F): , Max: 99.1 (01-21-23 @ 13:21)  HR: 88 (01-21-23 @ 13:21)  BP: 100/60 (01-21-23 @ 13:21)  BP(mean): --  RR: 18 (01-21-23 @ 13:21)  SpO2: 96% (01-21-23 @ 13:21)  Wt(kg): --    01-20-23 @ 07:01  -  01-21-23 @ 07:00  --------------------------------------------------------  IN: 440 mL / OUT: 50 mL / NET: 390 mL    01-21-23 @ 07:01  -  01-21-23 @ 14:41  --------------------------------------------------------  IN: 850 mL / OUT: 250 mL / NET: 600 mL        PHYSICAL EXAM:  General: NAD; Alert  HEENT:  NCAT; PERRLA  Neck: No JVD; supple  Respiratory: CTA-b/l  Cardiac: RRR s1s2  Gastrointestinal: BS+, soft, NT/ND , biliary drain in placed   Urologic: No chen  Extremities: No peripheral edema  Access:     LABS:                          11.5   13.38 )-----------( 188      ( 21 Jan 2023 06:59 )             37.6     Na(137)/K(4.0)/Cl(100)/HCO3(26)/BUN(43)/Cr(2.67)Glu(151)/Ca(8.7)/Mg(1.9)/PO4(3.8)    01-21 @ 06:59  Na(138)/K(4.1)/Cl(101)/HCO3(26)/BUN(38)/Cr(2.44)Glu(145)/Ca(9.0)/Mg(1.9)/PO4(3.5)    01-20 @ 06:35  Na(139)/K(4.1)/Cl(100)/HCO3(26)/BUN(26)/Cr(1.62)Glu(203)/Ca(9.4)/Mg(--)/PO4(--)    01-19 @ 06:58  Na(136)/K(4.2)/Cl(98)/HCO3(24)/BUN(31)/Cr(1.58)Glu(323)/Ca(9.4)/Mg(--)/PO4(--)    01-18 @ 16:14      Sodium, Random Urine: 27 mmol/L (01-21 @ 06:50)  Osmolality, Random Urine: 368 mos/kg (01-21 @ 06:50)  Potassium, Random Urine: 53 mmol/L (01-21 @ 06:50)    IMAGES:  CONTRAST/COMPLICATIONS:  IV Contrast: NONE  Oral Contrast: NONE  Complications: None reported at time of study completion    PROCEDURE:  CT of the Abdomen and Pelvis was performed.  Sagittal and coronal reformats were performed.    FINDINGS:  LOWER CHEST: Small bilateral pleural effusions. Cardiomegaly and coronary   artery calcifications. Partially imaged sternotomy.    LIVER: Within normal limits.  BILE DUCTS: Biliary stent in place. No intrahepatic biliary ductal   dilatation. Persistent CBD stones adjacent to the stent.  GALLBLADDER: Cholelithiasis. Ill-defined collection in the region of the   gallbladder neck and common bile duct measuring approximately 4.6 x 3.0   cm also encompassing the secondportion of the duodenum.  SPLEEN: Within normal limits.  PANCREAS: Fatty atrophy.  ADRENALS: Within normal limits.  KIDNEYS/URETERS: Unchanged chronically hydronephrotic right kidney with   right renal atrophy.    BLADDER: Within normal limits.  REPRODUCTIVE ORGANS: Myomatous uterus.    BOWEL: Rectosigmoid and small bowel anastomoses. No bowel obstruction.  PERITONEUM: No ascites.  VESSELS: Atherosclerotic changes.  RETROPERITONEUM/LYMPH NODES: No lymphadenopathy.  ABDOMINAL WALL: Redemonstrated small to moderate fat-containing ventral   hernia.  BONES: Right total hip arthroplasty. Degenerative changes.    IMPRESSION:  New ill-defined collection in the region of the gallbladder neck and   common bile duct also encompassing the second portionof the duodenum.   Findings may be related to bile leak, perforated cholecystitis, or   duodenal hematoma. Recommend further evaluation with MRI.    Interval placement of a stent within the common bile duct with a residual   CBD stones adjacent to the stent.    --- End of Report ---            ASSESSMENT/PLAN  Patient is a 80 year old F with a PMHx of HTN, HLD, T2DM, HFpEF (EF 65% X/2022), breast cancer s/p R partial mastectomy, rectal carcinoma s/p resection, CAD s/p CABG (11/2020) who presents for 1 day of progressively worsening abdominal pain associated with nausea and NBNB vomiting. 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  daily bmp  urine lytes   2)CVS:  BP soft  . started  on metoprolol 50 mg q6h ( with holding parameters)   3) ID: on zosyn , consider switching  , abdominal fluid yeast growth seen ID on board.  4) GI  on board          [facility]  Office: [phone]

## 2023-01-28 LAB
ALBUMIN SERPL ELPH-MCNC: 2.6 G/DL — LOW (ref 3.3–5)
ALP SERPL-CCNC: 118 U/L — SIGNIFICANT CHANGE UP (ref 40–120)
ALT FLD-CCNC: 8 U/L — LOW (ref 10–45)
ANION GAP SERPL CALC-SCNC: 15 MMOL/L — SIGNIFICANT CHANGE UP (ref 5–17)
AST SERPL-CCNC: 12 U/L — SIGNIFICANT CHANGE UP (ref 10–40)
BILIRUB SERPL-MCNC: 0.2 MG/DL — SIGNIFICANT CHANGE UP (ref 0.2–1.2)
BUN SERPL-MCNC: 47 MG/DL — HIGH (ref 7–23)
CALCIUM SERPL-MCNC: 8.7 MG/DL — SIGNIFICANT CHANGE UP (ref 8.4–10.5)
CHLORIDE SERPL-SCNC: 103 MMOL/L — SIGNIFICANT CHANGE UP (ref 96–108)
CO2 SERPL-SCNC: 18 MMOL/L — LOW (ref 22–31)
CREAT SERPL-MCNC: 3.6 MG/DL — HIGH (ref 0.5–1.3)
EGFR: 12 ML/MIN/1.73M2 — LOW
GLUCOSE BLDC GLUCOMTR-MCNC: 217 MG/DL — HIGH (ref 70–99)
GLUCOSE BLDC GLUCOMTR-MCNC: 219 MG/DL — HIGH (ref 70–99)
GLUCOSE BLDC GLUCOMTR-MCNC: 222 MG/DL — HIGH (ref 70–99)
GLUCOSE BLDC GLUCOMTR-MCNC: 247 MG/DL — HIGH (ref 70–99)
GLUCOSE SERPL-MCNC: 222 MG/DL — HIGH (ref 70–99)
HCT VFR BLD CALC: 33.7 % — LOW (ref 34.5–45)
HGB BLD-MCNC: 10.1 G/DL — LOW (ref 11.5–15.5)
MAGNESIUM SERPL-MCNC: 2 MG/DL — SIGNIFICANT CHANGE UP (ref 1.6–2.6)
MCHC RBC-ENTMCNC: 29.4 PG — SIGNIFICANT CHANGE UP (ref 27–34)
MCHC RBC-ENTMCNC: 30 GM/DL — LOW (ref 32–36)
MCV RBC AUTO: 98.3 FL — SIGNIFICANT CHANGE UP (ref 80–100)
NRBC # BLD: 0 /100 WBCS — SIGNIFICANT CHANGE UP (ref 0–0)
PHOSPHATE SERPL-MCNC: 3.6 MG/DL — SIGNIFICANT CHANGE UP (ref 2.5–4.5)
PLATELET # BLD AUTO: 293 K/UL — SIGNIFICANT CHANGE UP (ref 150–400)
POTASSIUM SERPL-MCNC: 3.9 MMOL/L — SIGNIFICANT CHANGE UP (ref 3.5–5.3)
POTASSIUM SERPL-SCNC: 3.9 MMOL/L — SIGNIFICANT CHANGE UP (ref 3.5–5.3)
PROT SERPL-MCNC: 6.3 G/DL — SIGNIFICANT CHANGE UP (ref 6–8.3)
RBC # BLD: 3.43 M/UL — LOW (ref 3.8–5.2)
RBC # FLD: 16.3 % — HIGH (ref 10.3–14.5)
SODIUM SERPL-SCNC: 136 MMOL/L — SIGNIFICANT CHANGE UP (ref 135–145)
TROPONIN T, HIGH SENSITIVITY RESULT: 26 NG/L — SIGNIFICANT CHANGE UP (ref 0–51)
WBC # BLD: 12.26 K/UL — HIGH (ref 3.8–10.5)
WBC # FLD AUTO: 12.26 K/UL — HIGH (ref 3.8–10.5)

## 2023-01-28 PROCEDURE — 93010 ELECTROCARDIOGRAM REPORT: CPT

## 2023-01-28 PROCEDURE — 99233 SBSQ HOSP IP/OBS HIGH 50: CPT | Mod: GC

## 2023-01-28 PROCEDURE — 71045 X-RAY EXAM CHEST 1 VIEW: CPT | Mod: 26

## 2023-01-28 RX ORDER — INSULIN LISPRO 100/ML
5 VIAL (ML) SUBCUTANEOUS
Refills: 0 | Status: DISCONTINUED | OUTPATIENT
Start: 2023-01-28 | End: 2023-01-30

## 2023-01-28 RX ORDER — APIXABAN 2.5 MG/1
2.5 TABLET, FILM COATED ORAL
Refills: 0 | Status: DISCONTINUED | OUTPATIENT
Start: 2023-01-28 | End: 2023-02-02

## 2023-01-28 RX ORDER — OXYCODONE HYDROCHLORIDE 5 MG/1
10 TABLET ORAL ONCE
Refills: 0 | Status: DISCONTINUED | OUTPATIENT
Start: 2023-01-28 | End: 2023-01-28

## 2023-01-28 RX ADMIN — CHLORHEXIDINE GLUCONATE 1 APPLICATION(S): 213 SOLUTION TOPICAL at 08:36

## 2023-01-28 RX ADMIN — ATORVASTATIN CALCIUM 40 MILLIGRAM(S): 80 TABLET, FILM COATED ORAL at 21:36

## 2023-01-28 RX ADMIN — PIPERACILLIN AND TAZOBACTAM 25 GRAM(S): 4; .5 INJECTION, POWDER, LYOPHILIZED, FOR SOLUTION INTRAVENOUS at 17:20

## 2023-01-28 RX ADMIN — Medication 3 MILLILITER(S): at 14:53

## 2023-01-28 RX ADMIN — GABAPENTIN 200 MILLIGRAM(S): 400 CAPSULE ORAL at 14:50

## 2023-01-28 RX ADMIN — Medication 3 UNIT(S): at 12:35

## 2023-01-28 RX ADMIN — Medication 50 MILLIGRAM(S): at 11:02

## 2023-01-28 RX ADMIN — Medication 3 MILLILITER(S): at 08:43

## 2023-01-28 RX ADMIN — Medication 50 MILLIGRAM(S): at 21:36

## 2023-01-28 RX ADMIN — INSULIN GLARGINE 15 UNIT(S): 100 INJECTION, SOLUTION SUBCUTANEOUS at 21:36

## 2023-01-28 RX ADMIN — Medication 4: at 08:35

## 2023-01-28 RX ADMIN — PIPERACILLIN AND TAZOBACTAM 25 GRAM(S): 4; .5 INJECTION, POWDER, LYOPHILIZED, FOR SOLUTION INTRAVENOUS at 06:23

## 2023-01-28 RX ADMIN — Medication 5 UNIT(S): at 17:20

## 2023-01-28 RX ADMIN — Medication 50 MILLIGRAM(S): at 06:08

## 2023-01-28 RX ADMIN — CASPOFUNGIN ACETATE 260 MILLIGRAM(S): 7 INJECTION, POWDER, LYOPHILIZED, FOR SOLUTION INTRAVENOUS at 15:14

## 2023-01-28 RX ADMIN — APIXABAN 2.5 MILLIGRAM(S): 2.5 TABLET, FILM COATED ORAL at 17:19

## 2023-01-28 RX ADMIN — Medication 4: at 12:35

## 2023-01-28 RX ADMIN — Medication 50 MILLIGRAM(S): at 15:15

## 2023-01-28 RX ADMIN — HEPARIN SODIUM 5000 UNIT(S): 5000 INJECTION INTRAVENOUS; SUBCUTANEOUS at 06:18

## 2023-01-28 RX ADMIN — GABAPENTIN 200 MILLIGRAM(S): 400 CAPSULE ORAL at 21:36

## 2023-01-28 RX ADMIN — Medication 81 MILLIGRAM(S): at 11:04

## 2023-01-28 RX ADMIN — Medication 4: at 17:19

## 2023-01-28 RX ADMIN — Medication 3 UNIT(S): at 08:35

## 2023-01-28 RX ADMIN — GABAPENTIN 200 MILLIGRAM(S): 400 CAPSULE ORAL at 06:18

## 2023-01-28 NOTE — PROGRESS NOTE ADULT - ASSESSMENT
EP ATTENDING    Patient seen and examined. Agree with above. In brief she is a 81 y/o female PMH advanced CKD, hypertension, hyperlipidemia, diabetes, CAD s/p CABG (11/2020) with normal LVEF admitted on 1/18/23 with acute cholecystitis requiring ERCP and percutaneous drain. Yesterday she went into atrial flutter (difficult to tell if typical or atypical) and has since terminated to NSR. Her other medical history includes right breast CA s/p mastectomy (1989), rectal CA (2015)    Plan  -continue eliquis for lifelong anticoagulation for stroke prevention  -continue metoprolol 50 q6h  -if AF/AFL recurs will start amiodarone  -poor candidate for ablation at this time      Odilon Roque M.D., [facility]  Cardiac Electrophysiology  [facility]  [address] [place] [address], [place] [address]  [email or web address]    office [phone]  pager [phone]

## 2023-01-28 NOTE — PROGRESS NOTE ADULT - SUBJECTIVE AND OBJECTIVE BOX
DATE OF SERVICE: 01-28-23 @ 18:17    Patient is a 82y old  Female who presents with a chief complaint of acute cholecystitis, DKA (28 Jan 2023 12:43)      INTERVAL HISTORY: no complaints     REVIEW OF SYSTEMS:  CONSTITUTIONAL: No weakness  EYES/ENT: No visual changes;  No throat pain   NECK: No pain or stiffness  RESPIRATORY: No cough, wheezing; No shortness of breath  CARDIOVASCULAR: No chest pain or palpitations  GASTROINTESTINAL: No abdominal  pain. No nausea, vomiting, or hematemesis  GENITOURINARY: No dysuria, frequency or hematuria  NEUROLOGICAL: No stroke like symptoms  SKIN: No rashes      MEDICATIONS:  metoprolol tartrate 50 milliGRAM(s) Oral every 6 hours        PHYSICAL EXAM:  T(C): 36.4 (01-28-23 @ 17:00), Max: 36.8 (01-27-23 @ 22:32)  HR: 66 (01-28-23 @ 17:00) (66 - 70)  BP: 106/51 (01-28-23 @ 17:00) (104/64 - 120/75)  RR: 19 (01-28-23 @ 17:00) (18 - 20)  SpO2: 97% (01-28-23 @ 17:00) (97% - 99%)  Wt(kg): --  I&O's Summary    27 Jan 2023 07:01  -  28 Jan 2023 07:00  --------------------------------------------------------  IN: 1260 mL / OUT: 1620 mL / NET: -360 mL    28 Jan 2023 07:01  -  28 Jan 2023 18:17  --------------------------------------------------------  IN: 420 mL / OUT: 600 mL / NET: -180 mL          Appearance: In no distress	  HEENT:    PERRL, EOMI	  Cardiovascular:  S1 S2, No JVD  Respiratory: Lungs clear to auscultation	  Gastrointestinal:  Soft, Non-tender, + BS	  Vascularature:  No edema of LE  Psychiatric: Appropriate affect   Neuro: no acute focal deficits                               10.1   12.26 )-----------( 293      ( 28 Jan 2023 07:18 )             33.7     01-28    136  |  103  |  47<H>  ----------------------------<  222<H>  3.9   |  18<L>  |  3.60<H>    Ca    8.7      28 Jan 2023 07:18  Phos  3.6     01-28  Mg     2.0     01-28    TPro  6.3  /  Alb  2.6<L>  /  TBili  0.2  /  DBili  x   /  AST  12  /  ALT  8<L>  /  AlkPhos  118  01-28        Labs personally reviewed      ASSESSMENT/PLAN: 	  80 year old woman with CAD s/p CABG in 2020, prior DVT on Eliquis, HTN, and compensated diastolic CHF presents with acute cholecystitis.       #Post-operative risk evaluation-  S/p ERCP with CBD stent. Tolerate procedure well.   Ms. Batista displays no signs or symptoms of acute coronary ischemia or decompensated CHF.  Admission EKG is sinus with pre-existing anteroseptal q-waves.  Recent echo noted- normal LV systolic function with no hemodynamically significant valvular disease.  s/p drain placement vai IR 1/25    #Compensated diastolic CHF-  Resume PO Diuretics following procedure pending Renal in agreement    #CAD s/p CABG-  No signs of active ischemic.  c/w ASA and statin  - 1/26 with c/o epigastric burning a/b episodes of emesis. ECG performed, no ischemic changes noted.   -1/28 c/o chest pain and epigastric pain similar to 2 days ago. Repeat EKG with no ischemic changes. Trop wnl.    #Sepsis   - 2/2 acute cholecystitis   - S/p CBD stent  - s/p IR drain 1/25  - Afebrile, WBC downtrending    #SVT  - 1/26 patent HR in 130s while sleeping. Patient missed am dose of Metoprolol 150mg PO.  - Resume Metoprolol  - Adenosine 6mg IV x1 with termination of SVT  - cont to monitor on tele  - 1/27 with recurrent SVT s/p adenosine today   - EP consulted. Likely A-Tach. Recommend c/w Metoprolol 75mg PO BID and uptitrate as tolerated.                 MAINE Mancia DO [facility]  Cardiovascular Medicine  [address] [place] [address], [address]  Office: [phone]  Available via call/text on [employer] Teams  DATE OF SERVICE: 01-28-23 @ 18:17    Patient is a 82y old  Female who presents with a chief complaint of acute cholecystitis, DKA (28 Jan 2023 12:43)      INTERVAL HISTORY: no complaints     REVIEW OF SYSTEMS:  CONSTITUTIONAL: No weakness  EYES/ENT: No visual changes;  No throat pain   NECK: No pain or stiffness  RESPIRATORY: No cough, wheezing; No shortness of breath  CARDIOVASCULAR: No chest pain or palpitations  GASTROINTESTINAL: No abdominal  pain. No nausea, vomiting, or hematemesis  GENITOURINARY: No dysuria, frequency or hematuria  NEUROLOGICAL: No stroke like symptoms  SKIN: No rashes      MEDICATIONS:  metoprolol tartrate 50 milliGRAM(s) Oral every 6 hours        PHYSICAL EXAM:  T(C): 36.4 (01-28-23 @ 17:00), Max: 36.8 (01-27-23 @ 22:32)  HR: 66 (01-28-23 @ 17:00) (66 - 70)  BP: 106/51 (01-28-23 @ 17:00) (104/64 - 120/75)  RR: 19 (01-28-23 @ 17:00) (18 - 20)  SpO2: 97% (01-28-23 @ 17:00) (97% - 99%)  Wt(kg): --  I&O's Summary    27 Jan 2023 07:01  -  28 Jan 2023 07:00  --------------------------------------------------------  IN: 1260 mL / OUT: 1620 mL / NET: -360 mL    28 Jan 2023 07:01  -  28 Jan 2023 18:17  --------------------------------------------------------  IN: 420 mL / OUT: 600 mL / NET: -180 mL          Appearance: In no distress	  HEENT:    PERRL, EOMI	  Cardiovascular:  S1 S2, No JVD  Respiratory: Lungs clear to auscultation	  Gastrointestinal:  Soft, Non-tender, + BS	  Vascularature:  No edema of LE  Psychiatric: Appropriate affect   Neuro: no acute focal deficits                               10.1   12.26 )-----------( 293      ( 28 Jan 2023 07:18 )             33.7     01-28    136  |  103  |  47<H>  ----------------------------<  222<H>  3.9   |  18<L>  |  3.60<H>    Ca    8.7      28 Jan 2023 07:18  Phos  3.6     01-28  Mg     2.0     01-28    TPro  6.3  /  Alb  2.6<L>  /  TBili  0.2  /  DBili  x   /  AST  12  /  ALT  8<L>  /  AlkPhos  118  01-28        Labs personally reviewed      ASSESSMENT/PLAN: 	  80 year old woman with CAD s/p CABG in 2020, prior DVT on Eliquis, HTN, and compensated diastolic CHF presents with acute cholecystitis.       #Post-operative risk evaluation-  S/p ERCP with CBD stent. Tolerate procedure well.   Ms. Batista displays no signs or symptoms of acute coronary ischemia or decompensated CHF.  Admission EKG is sinus with pre-existing anteroseptal q-waves.  Recent echo noted- normal LV systolic function with no hemodynamically significant valvular disease.  s/p drain placement vai IR 1/25    #Compensated diastolic CHF-  Resume PO Diuretics following procedure pending Renal in agreement    #CAD s/p CABG-  No signs of active ischemic.  c/w ASA and statin  - 1/26 with c/o epigastric burning a/b episodes of emesis. ECG performed, no ischemic changes noted.   -1/28 c/o chest pain and epigastric pain similar to 2 days ago. Repeat EKG with no ischemic changes. Trop wnl.    #Sepsis   - 2/2 acute cholecystitis   - S/p CBD stent  - s/p IR drain 1/25  - Afebrile, WBC downtrending    #SVT  - 1/26 patent HR in 130s while sleeping. Patient missed am dose of Metoprolol 150mg PO.  - Resume Metoprolol  - Adenosine 6mg IV x1 with termination of SVT  - cont to monitor on tele  - 1/27 with recurrent SVT s/p adenosine today   - EP consulted. Likely A-Tach. Recommend c/w Metoprolol 75mg PO BID and uptitrate as tolerated.                 MAINE Mancia DO [facility]  Cardiovascular Medicine  [address] [place] [address], [address]  Office: [phone]  Available via call/text on Microsoft Teams

## 2023-01-28 NOTE — PROGRESS NOTE ADULT - SUBJECTIVE AND OBJECTIVE BOX
pt seen and examined, no complaints, ROS - .     acetaminophen     Tablet .. 650 milliGRAM(s) Oral every 6 hours PRN  albuterol/ipratropium for Nebulization 3 milliLiter(s) Nebulizer every 6 hours PRN  aluminum hydroxide/magnesium hydroxide/simethicone Suspension 30 milliLiter(s) Oral every 6 hours PRN  aspirin  chewable 81 milliGRAM(s) Oral daily  atorvastatin 40 milliGRAM(s) Oral at bedtime  caspofungin IVPB      caspofungin IVPB 50 milliGRAM(s) IV Intermittent every 24 hours  chlorhexidine 4% Liquid 1 Application(s) Topical <User Schedule>  dextrose 5%. 1000 milliLiter(s) IV Continuous <Continuous>  dextrose 5%. 1000 milliLiter(s) IV Continuous <Continuous>  dextrose 50% Injectable 25 Gram(s) IV Push once  dextrose 50% Injectable 12.5 Gram(s) IV Push once  dextrose 50% Injectable 25 Gram(s) IV Push once  dextrose Oral Gel 15 Gram(s) Oral once PRN  fentaNYL    Injectable 25 MICROGram(s) IV Push every 5 minutes PRN  gabapentin 200 milliGRAM(s) Oral three times a day  glucagon  Injectable 1 milliGRAM(s) IntraMuscular once  heparin   Injectable 5000 Unit(s) SubCutaneous every 8 hours  indomethacin Suppository 100 milliGRAM(s) Rectal once  insulin glargine Injectable (LANTUS) 15 Unit(s) SubCutaneous at bedtime  insulin lispro (ADMELOG) corrective regimen sliding scale   SubCutaneous at bedtime  insulin lispro (ADMELOG) corrective regimen sliding scale   SubCutaneous three times a day before meals  insulin lispro Injectable (ADMELOG) 3 Unit(s) SubCutaneous three times a day before meals  metoprolol tartrate 50 milliGRAM(s) Oral every 6 hours  piperacillin/tazobactam IVPB.. 3.375 Gram(s) IV Intermittent every 12 hours  sodium chloride 0.9% lock flush 10 milliLiter(s) IV Push every 1 hour PRN                            10.1   12.26 )-----------( 293      ( 28 Jan 2023 07:18 )             33.7       Hemoglobin: 10.1 g/dL (01-28 @ 07:18)  Hemoglobin: 10.9 g/dL (01-27 @ 07:14)  Hemoglobin: 10.4 g/dL (01-26 @ 14:13)  Hemoglobin: 10.1 g/dL (01-25 @ 07:17)  Hemoglobin: 10.0 g/dL (01-24 @ 09:22)      01-28    136  |  103  |  47<H>  ----------------------------<  222<H>  3.9   |  18<L>  |  3.60<H>    Ca    8.7      28 Jan 2023 07:18  Phos  3.6     01-28  Mg     2.0     01-28    TPro  6.3  /  Alb  2.6<L>  /  TBili  0.2  /  DBili  x   /  AST  12  /  ALT  8<L>  /  AlkPhos  118  01-28    Creatinine Trend: 3.60<--, 3.82<--, 3.79<--, 4.09<--, 3.80<--, 3.78<--    COAGS:     CARDIAC MARKERS ( 26 Jan 2023 14:13 )  x     / x     / 26 U/L / x     / 1.0 ng/mL        T(C): 36.6 (01-28-23 @ 05:37), Max: 37 (01-27-23 @ 13:47)  HR: 68 (01-28-23 @ 05:37) (66 - 129)  BP: 116/74 (01-28-23 @ 05:37) (98/64 - 120/75)  RR: 18 (01-28-23 @ 05:37) (16 - 20)  SpO2: 98% (01-28-23 @ 05:37) (85% - 99%)  Wt(kg): --    I&O's Summary    27 Jan 2023 07:01  -  28 Jan 2023 07:00  --------------------------------------------------------  IN: 1260 mL / OUT: 1620 mL / NET: -360 mL      Gen: Appears fatigued but A&O x 3  HEENT:  (-)icterus (-)pallor  CV: N S1 S2 1/6 JAKY (+)2 Pulses B/l  Resp:  Clear to ausculatation B/L, normal effort  GI: (+) BS Soft, NT, ND  Lymph:  (-)Edema, (-)obvious lymphadenopathy  Skin: Warm to touch, Normal turgor  Psych: Appropriate mood and affect    TELEMETRY: 	SR, Atrial tachycardia 130s        < from: TTE with Doppler (w/Cont) (09.09.22 @ 09:49) >  Conclusions:  1. Mild mitral annular calcification. Mitral annular  dilatation with normal leaflet opening. Mild mitral  regurgitation.  2. Aortic valve not well visualized; appears calcified.  Minimal aortic regurgitation.  3. Endocardial visualization enhanced with intravenous  injection of Ultrasonic Enhancing Agent (Definity). Left  ventricle suboptimally visualized despite intravenous  ultrasonic enhancing agent; the apex is foreshortened on  apical images. Overall normal left ventricular systolic  function.  4. Increased E/e' suggestive of elevated left ventricular  filling pressure.  5. The right ventricle is not well visualized; grossly  normal right ventricular size and systolic function.  *** Compared with echocardiogram of 1/11/2022, increased  E/e' suggestive of increased LV filling pressure is noted  on today's study.  ------------------------------------------------------------------------  Confirmed on  9/9/2022 - 12:08:11 by Bridgett Edmond M.D.    < end of copied text >        ASSESSMENT/PLAN: 	Patient is a 80 year old F with a PMHx of HTN, HLD, T2DM, HFpEF (EF 65%), breast cancer s/p R partial mastectomy, rectal carcinoma s/p resection, CAD s/p CABG (11/2020) who presents for 1 day of progressively worsening abdominal pain associated with nausea and NBNB vomiting.  Found to have acute rissa, s/p ERCP and stent and Perc rissa drain placed.  EP called for 2 episodes of tachycardia causing RRT.  Pt received Adenosine 6mg x 1 each time which slowed HR down.    1. Tachycardia  -- stll in  2:1 atrial tachycardia  --cont PO metoprolol if tolerating  --cont telemetry          pt seen and examined, no complaints, ROS - .     acetaminophen     Tablet .. 650 milliGRAM(s) Oral every 6 hours PRN  albuterol/ipratropium for Nebulization 3 milliLiter(s) Nebulizer every 6 hours PRN  aluminum hydroxide/magnesium hydroxide/simethicone Suspension 30 milliLiter(s) Oral every 6 hours PRN  aspirin  chewable 81 milliGRAM(s) Oral daily  atorvastatin 40 milliGRAM(s) Oral at bedtime  caspofungin IVPB      caspofungin IVPB 50 milliGRAM(s) IV Intermittent every 24 hours  chlorhexidine 4% Liquid 1 Application(s) Topical <User Schedule>  dextrose 5%. 1000 milliLiter(s) IV Continuous <Continuous>  dextrose 5%. 1000 milliLiter(s) IV Continuous <Continuous>  dextrose 50% Injectable 25 Gram(s) IV Push once  dextrose 50% Injectable 12.5 Gram(s) IV Push once  dextrose 50% Injectable 25 Gram(s) IV Push once  dextrose Oral Gel 15 Gram(s) Oral once PRN  fentaNYL    Injectable 25 MICROGram(s) IV Push every 5 minutes PRN  gabapentin 200 milliGRAM(s) Oral three times a day  glucagon  Injectable 1 milliGRAM(s) IntraMuscular once  heparin   Injectable 5000 Unit(s) SubCutaneous every 8 hours  indomethacin Suppository 100 milliGRAM(s) Rectal once  insulin glargine Injectable (LANTUS) 15 Unit(s) SubCutaneous at bedtime  insulin lispro (ADMELOG) corrective regimen sliding scale   SubCutaneous at bedtime  insulin lispro (ADMELOG) corrective regimen sliding scale   SubCutaneous three times a day before meals  insulin lispro Injectable (ADMELOG) 3 Unit(s) SubCutaneous three times a day before meals  metoprolol tartrate 50 milliGRAM(s) Oral every 6 hours  piperacillin/tazobactam IVPB.. 3.375 Gram(s) IV Intermittent every 12 hours  sodium chloride 0.9% lock flush 10 milliLiter(s) IV Push every 1 hour PRN                            10.1   12.26 )-----------( 293      ( 28 Jan 2023 07:18 )             33.7       Hemoglobin: 10.1 g/dL (01-28 @ 07:18)  Hemoglobin: 10.9 g/dL (01-27 @ 07:14)  Hemoglobin: 10.4 g/dL (01-26 @ 14:13)  Hemoglobin: 10.1 g/dL (01-25 @ 07:17)  Hemoglobin: 10.0 g/dL (01-24 @ 09:22)      01-28    136  |  103  |  47<H>  ----------------------------<  222<H>  3.9   |  18<L>  |  3.60<H>    Ca    8.7      28 Jan 2023 07:18  Phos  3.6     01-28  Mg     2.0     01-28    TPro  6.3  /  Alb  2.6<L>  /  TBili  0.2  /  DBili  x   /  AST  12  /  ALT  8<L>  /  AlkPhos  118  01-28    Creatinine Trend: 3.60<--, 3.82<--, 3.79<--, 4.09<--, 3.80<--, 3.78<--    COAGS:     CARDIAC MARKERS ( 26 Jan 2023 14:13 )  x     / x     / 26 U/L / x     / 1.0 ng/mL        T(C): 36.6 (01-28-23 @ 05:37), Max: 37 (01-27-23 @ 13:47)  HR: 68 (01-28-23 @ 05:37) (66 - 129)  BP: 116/74 (01-28-23 @ 05:37) (98/64 - 120/75)  RR: 18 (01-28-23 @ 05:37) (16 - 20)  SpO2: 98% (01-28-23 @ 05:37) (85% - 99%)  Wt(kg): --    I&O's Summary    27 Jan 2023 07:01  -  28 Jan 2023 07:00  --------------------------------------------------------  IN: 1260 mL / OUT: 1620 mL / NET: -360 mL      Gen: Appears fatigued but A&O x 3  HEENT:  (-)icterus (-)pallor  CV: N S1 S2 1/6 JAKY (+)2 Pulses B/l  Resp:  Clear to ausculatation B/L, normal effort  GI: (+) BS Soft, NT, ND  Lymph:  (-)Edema, (-)obvious lymphadenopathy  Skin: Warm to touch, Normal turgor  Psych: Appropriate mood and affect    TELEMETRY: 	SR, Atrial tachycardia 130s        < from: TTE with Doppler (w/Cont) (09.09.22 @ 09:49) >  Conclusions:  1. Mild mitral annular calcification. Mitral annular  dilatation with normal leaflet opening. Mild mitral  regurgitation.  2. Aortic valve not well visualized; appears calcified.  Minimal aortic regurgitation.  3. Endocardial visualization enhanced with intravenous  injection of Ultrasonic Enhancing Agent (Definity). Left  ventricle suboptimally visualized despite intravenous  ultrasonic enhancing agent; the apex is foreshortened on  apical images. Overall normal left ventricular systolic  function.  4. Increased E/e' suggestive of elevated left ventricular  filling pressure.  5. The right ventricle is not well visualized; grossly  normal right ventricular size and systolic function.  *** Compared with echocardiogram of 1/11/2022, increased  E/e' suggestive of increased LV filling pressure is noted  on today's study.  ------------------------------------------------------------------------  Confirmed on  9/9/2022 - 12:08:11 by Bridgett Edmond M.D.    < end of copied text >        ASSESSMENT/PLAN: 	Patient is a 80 year old F with a PMHx of HTN, HLD, T2DM, HFpEF (EF 65%), breast cancer s/p R partial mastectomy, rectal carcinoma s/p resection, CAD s/p CABG (11/2020) who presents for 1 day of progressively worsening abdominal pain associated with nausea and NBNB vomiting.  Found to have acute rissa, s/p ERCP and stent and Perc rissa drain placed.  EP called for 2 episodes of tachycardia causing RRT.  Pt received Adenosine 6mg x 1 each time which slowed HR down.    see attending addendum below

## 2023-01-28 NOTE — CHART NOTE - NSCHARTNOTEFT_GEN_A_CORE
Called by RN after patient complained of chest pain. Patient evaluated at bedside; daughter Shauna provided Luxem[place]ish translation. Patient complaining of 4-5/10 substernal chest and epigastric pain that radiates to the L shoulder.     Patient examined at bedside, vitals WNL, HR stable. Appears uncomfortable with mildly inc work of breathing, notable wheezing throughout bilateral lung fields. EKG obtained which reveals sinus rhythm HR 66 without any new ST changes, STAT trop flat at 26. Pain self resolved during course of interview prior to administration of medication. Will continue to monitor and f/u CXR    Dmitry Dangelo  PGY-1, Internal Medicine  Available on [employer] Teams  Pager: [phone] ([facility]), [phone] (NS)

## 2023-01-28 NOTE — PROGRESS NOTE ADULT - PROBLEM SELECTOR PLAN 4
Patient presenting with mild DKA given BHB of 2 and serum glucose of 450  - resolved, or likely was due to starvation ketosis given AG closed, pH normal, HCO3 normal, glucose improving  - patient on oral agents: repaglinide and semaglutide  - upon last admission 8/2022, patient was well controlled on lantus 16U QHS and admelog 7U TID  - A1c stable at 9.6  - FS elevated persistently to 200s now that patient has been tolerating a diet    Plan:  > inc lantus to 15U QHS  > moderate dose correctional sliding scale with meals while eating Patient presenting with mild DKA given BHB of 2 and serum glucose of 450  - resolved, or likely was due to starvation ketosis given AG closed, pH normal, HCO3 normal, glucose improving  - patient on oral agents: repaglinide and semaglutide  - upon last admission 8/2022, patient was well controlled on lantus 16U QHS and admelog 7U TID  - A1c stable at 9.6  - FS elevated persistently to 200s now that patient has been tolerating a diet    Plan:  > inc lantus to 15U QHS  > add premeal admelog to 5U  > moderate dose correctional sliding scale with meals while eating

## 2023-01-28 NOTE — PROGRESS NOTE ADULT - SUBJECTIVE AND OBJECTIVE BOX
PROGRESS NOTE:     Patient is a 82y old  Female who presents with a chief complaint of acute cholecystitis, DKA (27 Jan 2023 16:07)      ---------------------------------------------------  Dmitry Dangelo  PGY-1, Internal Medicine  Available on [employer] Teams  Pager: [phone] ([facility]), or [phone] ([facility])  ---------------------------------------------------    SUBJECTIVE / OVERNIGHT EVENTS:    No acute events overnight. Patient examined at bedside with no acute complaints.     Pain:  Bowel Movements:  Urination:  OOB:  PT:    REVIEW OF SYSTEMS:    CONSTITUTIONAL: No weakness, fevers or chills  EYES/ENT: No visual changes;  No vertigo or throat pain   NECK: No pain or stiffness  RESPIRATORY: No cough, wheezing, hemoptysis; No shortness of breath  CARDIOVASCULAR: No chest pain or palpitations  GASTROINTESTINAL: No abdominal or epigastric pain. No nausea, vomiting, or hematemesis; No diarrhea or constipation. No melena or hematochezia.  GENITOURINARY: No dysuria, frequency or hematuria  NEUROLOGICAL: No numbness or weakness  SKIN: No itching, rashes      MEDICATIONS  (STANDING):  aspirin  chewable 81 milliGRAM(s) Oral daily  atorvastatin 40 milliGRAM(s) Oral at bedtime  caspofungin IVPB      caspofungin IVPB 50 milliGRAM(s) IV Intermittent every 24 hours  chlorhexidine 4% Liquid 1 Application(s) Topical <User Schedule>  dextrose 5%. 1000 milliLiter(s) (50 mL/Hr) IV Continuous <Continuous>  dextrose 5%. 1000 milliLiter(s) (100 mL/Hr) IV Continuous <Continuous>  dextrose 50% Injectable 25 Gram(s) IV Push once  dextrose 50% Injectable 12.5 Gram(s) IV Push once  dextrose 50% Injectable 25 Gram(s) IV Push once  gabapentin 200 milliGRAM(s) Oral three times a day  glucagon  Injectable 1 milliGRAM(s) IntraMuscular once  heparin   Injectable 5000 Unit(s) SubCutaneous every 8 hours  indomethacin Suppository 100 milliGRAM(s) Rectal once  insulin glargine Injectable (LANTUS) 15 Unit(s) SubCutaneous at bedtime  insulin lispro (ADMELOG) corrective regimen sliding scale   SubCutaneous at bedtime  insulin lispro (ADMELOG) corrective regimen sliding scale   SubCutaneous three times a day before meals  insulin lispro Injectable (ADMELOG) 3 Unit(s) SubCutaneous three times a day before meals  metoprolol tartrate 50 milliGRAM(s) Oral every 6 hours  piperacillin/tazobactam IVPB.. 3.375 Gram(s) IV Intermittent every 12 hours    MEDICATIONS  (PRN):  acetaminophen     Tablet .. 650 milliGRAM(s) Oral every 6 hours PRN Temp greater or equal to 38C (100.4F), Mild Pain (1 - 3), Moderate Pain (4 - 6)  albuterol/ipratropium for Nebulization 3 milliLiter(s) Nebulizer every 6 hours PRN Shortness of Breath and/or Wheezing  aluminum hydroxide/magnesium hydroxide/simethicone Suspension 30 milliLiter(s) Oral every 6 hours PRN Dyspepsia  dextrose Oral Gel 15 Gram(s) Oral once PRN Blood Glucose LESS THAN 70 milliGRAM(s)/deciliter  fentaNYL    Injectable 25 MICROGram(s) IV Push every 5 minutes PRN Moderate Pain (4 - 6)  sodium chloride 0.9% lock flush 10 milliLiter(s) IV Push every 1 hour PRN Pre/post blood products, medications, blood draw, and to maintain line patency      CAPILLARY BLOOD GLUCOSE      POCT Blood Glucose.: 263 mg/dL (27 Jan 2023 21:49)  POCT Blood Glucose.: 261 mg/dL (27 Jan 2023 17:38)  POCT Blood Glucose.: 253 mg/dL (27 Jan 2023 14:39)  POCT Blood Glucose.: 225 mg/dL (27 Jan 2023 12:24)  POCT Blood Glucose.: 220 mg/dL (27 Jan 2023 08:29)    I&O's Summary    27 Jan 2023 07:01  -  28 Jan 2023 07:00  --------------------------------------------------------  IN: 1260 mL / OUT: 1620 mL / NET: -360 mL        VITALS:   T(C): 36.6 (01-28-23 @ 05:37), Max: 37 (01-27-23 @ 13:47)  HR: 68 (01-28-23 @ 05:37) (66 - 129)  BP: 116/74 (01-28-23 @ 05:37) (98/64 - 120/75)  RR: 18 (01-28-23 @ 05:37) (16 - 20)  SpO2: 98% (01-28-23 @ 05:37) (85% - 99%)    GENERAL: NAD, lying in bed comfortably  HEAD:  Atraumatic, normocephalic  EYES: EOMI, PERRLA, conjunctiva and sclera clear  ENT: Moist mucous membranes  NECK: Supple, no JVD  HEART: Regular rate and rhythm, no murmurs, rubs, or gallops  LUNGS: Unlabored respirations.  Clear to auscultation bilaterally, no crackles, wheezing, or rhonchi  ABDOMEN: Soft, nontender, nondistended, +BS  EXTREMITIES: 2+ peripheral pulses bilaterally. No clubbing, cyanosis, or edema  NERVOUS SYSTEM:  A&Ox3, no focal deficits   SKIN: No rashes or lesions    LABS:                        10.9   11.55 )-----------( 248      ( 27 Jan 2023 07:14 )             35.6     01-27    135  |  99  |  52<H>  ----------------------------<  236<H>  4.4   |  17<L>  |  3.82<H>    Ca    8.6      27 Jan 2023 07:29  Phos  3.6     01-27  Mg     2.1     01-27    TPro  6.3  /  Alb  2.6<L>  /  TBili  0.2  /  DBili  x   /  AST  19  /  ALT  10  /  AlkPhos  124<H>  01-27      CARDIAC MARKERS ( 26 Jan 2023 14:13 )  x     / x     / 26 U/L / x     / 1.0 ng/mL          Culture - Body Fluid with Gram Stain (collected 25 Jan 2023 15:34)  Source: Abdominal Fl Abdominal Fluid  Gram Stain (25 Jan 2023 23:26):    No polymorphonuclear cells seen per low power field    Few Gram Positive Rods per oil power field  Preliminary Report (27 Jan 2023 19:20):    Rare Candida albicans        RADIOLOGY & ADDITIONAL TESTS:  Results Reviewed:   Imaging Personally Reviewed:  Electrocardiogram Personally Reviewed:    COORDINATION OF CARE:  Care Discussed with Consultants/Other Providers [Y/N]:  Prior or Outpatient Records Reviewed [Y/N]:     PROGRESS NOTE:     Patient is a 82y old  Female who presents with a chief complaint of acute cholecystitis, DKA (27 Jan 2023 16:07)      ---------------------------------------------------  Dmitry Dangelo  PGY-1, Internal Medicine  Available on [employer] Teams  Pager: [phone] ([facility]), or [phone] ([facility])  ---------------------------------------------------    SUBJECTIVE / OVERNIGHT EVENTS:    Yesterday PM, patient had RRT for SVT determined to be likely atrial tachycardia, A flutter. Patient received adneosine during RRT which broke arrhythmia. Tele strip reviewed this AM which is consistent with ATach upon breaking of arrhythmia. Patient had 3.3 sec of PAT this AM, self resolving. Patient examined at bedside with no acute complaints.       MEDICATIONS  (STANDING):  aspirin  chewable 81 milliGRAM(s) Oral daily  atorvastatin 40 milliGRAM(s) Oral at bedtime  caspofungin IVPB      caspofungin IVPB 50 milliGRAM(s) IV Intermittent every 24 hours  chlorhexidine 4% Liquid 1 Application(s) Topical <User Schedule>  dextrose 5%. 1000 milliLiter(s) (50 mL/Hr) IV Continuous <Continuous>  dextrose 5%. 1000 milliLiter(s) (100 mL/Hr) IV Continuous <Continuous>  dextrose 50% Injectable 25 Gram(s) IV Push once  dextrose 50% Injectable 12.5 Gram(s) IV Push once  dextrose 50% Injectable 25 Gram(s) IV Push once  gabapentin 200 milliGRAM(s) Oral three times a day  glucagon  Injectable 1 milliGRAM(s) IntraMuscular once  heparin   Injectable 5000 Unit(s) SubCutaneous every 8 hours  indomethacin Suppository 100 milliGRAM(s) Rectal once  insulin glargine Injectable (LANTUS) 15 Unit(s) SubCutaneous at bedtime  insulin lispro (ADMELOG) corrective regimen sliding scale   SubCutaneous at bedtime  insulin lispro (ADMELOG) corrective regimen sliding scale   SubCutaneous three times a day before meals  insulin lispro Injectable (ADMELOG) 3 Unit(s) SubCutaneous three times a day before meals  metoprolol tartrate 50 milliGRAM(s) Oral every 6 hours  piperacillin/tazobactam IVPB.. 3.375 Gram(s) IV Intermittent every 12 hours    MEDICATIONS  (PRN):  acetaminophen     Tablet .. 650 milliGRAM(s) Oral every 6 hours PRN Temp greater or equal to 38C (100.4F), Mild Pain (1 - 3), Moderate Pain (4 - 6)  albuterol/ipratropium for Nebulization 3 milliLiter(s) Nebulizer every 6 hours PRN Shortness of Breath and/or Wheezing  aluminum hydroxide/magnesium hydroxide/simethicone Suspension 30 milliLiter(s) Oral every 6 hours PRN Dyspepsia  dextrose Oral Gel 15 Gram(s) Oral once PRN Blood Glucose LESS THAN 70 milliGRAM(s)/deciliter  fentaNYL    Injectable 25 MICROGram(s) IV Push every 5 minutes PRN Moderate Pain (4 - 6)  sodium chloride 0.9% lock flush 10 milliLiter(s) IV Push every 1 hour PRN Pre/post blood products, medications, blood draw, and to maintain line patency      CAPILLARY BLOOD GLUCOSE      POCT Blood Glucose.: 263 mg/dL (27 Jan 2023 21:49)  POCT Blood Glucose.: 261 mg/dL (27 Jan 2023 17:38)  POCT Blood Glucose.: 253 mg/dL (27 Jan 2023 14:39)  POCT Blood Glucose.: 225 mg/dL (27 Jan 2023 12:24)  POCT Blood Glucose.: 220 mg/dL (27 Jan 2023 08:29)    I&O's Summary    27 Jan 2023 07:01  -  28 Jan 2023 07:00  --------------------------------------------------------  IN: 1260 mL / OUT: 1620 mL / NET: -360 mL        VITALS:   T(C): 36.6 (01-28-23 @ 05:37), Max: 37 (01-27-23 @ 13:47)  HR: 68 (01-28-23 @ 05:37) (66 - 129)  BP: 116/74 (01-28-23 @ 05:37) (98/64 - 120/75)  RR: 18 (01-28-23 @ 05:37) (16 - 20)  SpO2: 98% (01-28-23 @ 05:37) (85% - 99%)    GENERAL: Older woman in NAD, lying in bed comfortably  ENT: Moist mucous membranes  NECK: Supple   CHEST/LUNG: End expiratory wheezing b/l  HEART: Regular rate and rhythm; No murmurs, rubs, or gallops  ABDOMEN: mild RUQ tenderness TTP, cholecystostomy tube with bilious drainage, tube insertion site c/d/i   EXTREMITIES: +bilateral UE lymphedema R > L, 1+ bilateral LE pitting edema, 2+ peripheral pulses bilaterally  NERVOUS SYSTEM:  Alert & Oriented X3, speech clear. No deficits   MSK: FROM all 4 extremities, full and equal strength      LABS:                          10.1   12.26 )-----------( 293      ( 28 Jan 2023 07:18 )             33.7     01-28    136  |  103  |  47<H>  ----------------------------<  222<H>  3.9   |  18<L>  |  3.60<H>    Ca    8.7      28 Jan 2023 07:18  Phos  3.6     01-28  Mg     2.0     01-28    TPro  6.3  /  Alb  2.6<L>  /  TBili  0.2  /  DBili  x   /  AST  12  /  ALT  8<L>  /  AlkPhos  118  01-28      CARDIAC MARKERS ( 26 Jan 2023 14:13 )  x     / x     / 26 U/L / x     / 1.0 ng/mL          Culture - Body Fluid with Gram Stain (collected 25 Jan 2023 15:34)  Source: Abdominal Fl Abdominal Fluid  Gram Stain (25 Jan 2023 23:26):    No polymorphonuclear cells seen per low power field    Few Gram Positive Rods per oil power field  Preliminary Report (27 Jan 2023 19:20):    Rare Candida albicans

## 2023-01-28 NOTE — PROGRESS NOTE ADULT - PROBLEM SELECTOR PLAN 2
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

## 2023-01-28 NOTE — PROGRESS NOTE ADULT - ATTENDING COMMENTS
82F pmhx of DM2, HLD, HFpEF, breast cancer w/ mastectomy, Eliquis for DVT long ago, presented with ab pain admitted for acute cholecystitis with choledocholithiasias s/p ERCP with sphincterotomy and cbd stented and per rissa drain placed 1/25 with biliary fluid growing Candida. On zosyn and fluconazole. c/b SVT s/p adenosine, on metoprolol 50q6hr. EP following.     During the afternoon, endorsed RUQ pain migrating to epigastric then to left shoulder. Repeat ecg no ROSA or STD. Trops 26, same from 1/27. CXR order to assess for pulm edema (diffuse expiratory wheeze). Restarted Eliquis for Afib    d/w HS4    Jayla Lerma MD  Division of Hospital Medicine  Available on [employer] Teams

## 2023-01-28 NOTE — PROGRESS NOTE ADULT - PROBLEM SELECTOR PLAN 7
Diet: regular, CC  DVT PPx: heparin subq  Dispo: pending clinical improvement Diet: regular, CC  DVT PPx: restarted on home eliquis  Dispo: pending clinical improvement

## 2023-01-28 NOTE — PROGRESS NOTE ADULT - ASSESSMENT
Patient is a 82 year old F with a PMHx of HTN, HLD, T2DM, HFpEF (EF 65% X/2022), breast cancer s/p R partial mastectomy, rectal carcinoma s/p resection, CAD s/p CABG who presents for 1 week of intermittent abdominal pain associated with nausea and vomiting, found to have acute cholecystitis with choledocholithiasis. Patient initially underwent ERCP with CBD placement but was found to have a new fluid collection adjacent to the gallbladder neck, now s/p percutaneous cholecystostomy. Patient is a 82 year old F with a PMHx of HTN, HLD, T2DM, HFpEF (EF 65% X/2022), breast cancer s/p R partial mastectomy, rectal carcinoma s/p resection, CAD s/p CABG who presents for 1 week of intermittent abdominal pain associated with nausea and vomiting, found to have acute cholecystitis with choledocholithiasis. Patient initially underwent ERCP with CBD placement but was found to have a new fluid collection adjacent to the gallbladder neck, now s/p percutaneous cholecystostomy, with fluid cx concerning for biliary candidiasis. Hospital course c/b recurrent SVT, likely A flutter.

## 2023-01-28 NOTE — PROGRESS NOTE ADULT - PROBLEM SELECTOR PLAN 1
Patient presenting with 1 week of abdominal pain, nausea, and vomiting  - Initial CT A/P: acute cholecystitis with 1.2 cm common bile duct dilatation  - per surgery, not a surgical candidate given comorbidities   - s/p ERCP 1/20 : choledocholithiasis w/ large stone w/ sphincterotomy and biliary stent placement.   - patient now with recurrent abdominal pain, and leukocytosis, repeat CT A/P and MRI non con concerning for multiloculated fluid collection around the neck of the gallbladder, possible perf    Plan:  > s/p IR placement of perc rissa  > initial bilious fluid cultures growing gram positive rods and few yeasts  > continue with zosyn 7 days after source control (1/25 - )   > f/u ID recs re coverage of gram + rods and yeast  > pain/nausea control with tylenol and maalox  > Patient will need repeat ERCP in 2-3 months for stent removal Patient presenting with 1 week of abdominal pain, nausea, and vomiting  - Initial CT A/P: acute cholecystitis with 1.2 cm common bile duct dilatation  - per surgery, not a surgical candidate given comorbidities   - s/p ERCP 1/20 : choledocholithiasis w/ large stone w/ sphincterotomy and biliary stent placement.   - patient now with recurrent abdominal pain, and leukocytosis, repeat CT A/P and MRI non con concerning for multiloculated fluid collection around the neck of the gallbladder, possible perf  - s/p perc rissa  - abdominal fluid cx growing gram positive rods and candida albicans  - concerning for biliary candidiasis    Plan:  > continue with zosyn 7 days after source control (1/25 - )   > continue with caspofungin per ID recs  > pain/nausea control with tylenol and maalox  > Patient will need repeat ERCP in 2-3 months for stent removal

## 2023-01-28 NOTE — PROGRESS NOTE ADULT - ASSESSMENT
ASSESSMENT:  Patient is a 80 year old F with a PMHx of HTN, HLD, T2DM, HFpEF (EF 65% X/2022), breast cancer s/p R partial mastectomy, rectal carcinoma s/p resection, CAD s/p CABG (11/2020) who presents for 1 day of progressively worsening abdominal pain associated with nausea and NBNB vomiting. 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  low nephron mass   non oliguric harleen likely in the view of relative  hypotension   hyponatremia-- improving   CVS----BP soft at times, stable at present    GI:  acute cholecystitis --- ERCP with stent placement  1/20/23  cholecystotomy tube placement  1/25/23 perc     RECOMMENDATION:  1)Renal: cr slightly lower   non oliguric harleen  monitor resp status   avoid nephrotoxic medication  dose all medications for gfr ~10-15  cont holding diuretics   daily bmp  2)CVS:  BP soft at times, stable,  started on metoprolol 50 mg q6h ( with holding parameters)   3) ID: on IV capsofungin and zosyn consider switching, abdominal fluid yeast growth seen ID on board.  4) GI on board    Frances Mike NP-C  [facility]  [phone]

## 2023-01-28 NOTE — PROGRESS NOTE ADULT - SUBJECTIVE AND OBJECTIVE BOX
NEPHROLOGY     Patient seen and examined resting comfortably no new complaints, comfortable on 1L NC, in no acute distress.     MEDICATIONS  (STANDING):  apixaban 2.5 milliGRAM(s) Oral two times a day  aspirin  chewable 81 milliGRAM(s) Oral daily  atorvastatin 40 milliGRAM(s) Oral at bedtime  caspofungin IVPB      caspofungin IVPB 50 milliGRAM(s) IV Intermittent every 24 hours  chlorhexidine 4% Liquid 1 Application(s) Topical <User Schedule>  dextrose 5%. 1000 milliLiter(s) (100 mL/Hr) IV Continuous <Continuous>  dextrose 5%. 1000 milliLiter(s) (50 mL/Hr) IV Continuous <Continuous>  dextrose 50% Injectable 25 Gram(s) IV Push once  dextrose 50% Injectable 12.5 Gram(s) IV Push once  dextrose 50% Injectable 25 Gram(s) IV Push once  gabapentin 200 milliGRAM(s) Oral three times a day  glucagon  Injectable 1 milliGRAM(s) IntraMuscular once  heparin   Injectable 5000 Unit(s) SubCutaneous every 8 hours  indomethacin Suppository 100 milliGRAM(s) Rectal once  insulin glargine Injectable (LANTUS) 15 Unit(s) SubCutaneous at bedtime  insulin lispro (ADMELOG) corrective regimen sliding scale   SubCutaneous at bedtime  insulin lispro (ADMELOG) corrective regimen sliding scale   SubCutaneous three times a day before meals  insulin lispro Injectable (ADMELOG) 3 Unit(s) SubCutaneous three times a day before meals  metoprolol tartrate 50 milliGRAM(s) Oral every 6 hours  piperacillin/tazobactam IVPB.. 3.375 Gram(s) IV Intermittent every 12 hours    VITALS:  T(C): , Max: 37 (01-27-23 @ 13:47)  T(F): , Max: 98.6 (01-27-23 @ 13:47)  HR: 66 (01-28-23 @ 10:51)  BP: 106/69 (01-28-23 @ 10:51)  BP(mean): 87 (01-27-23 @ 15:45)  RR: 18 (01-28-23 @ 10:51)  SpO2: 98% (01-28-23 @ 10:51)    I and O's:    01-27 @ 07:01  -  01-28 @ 07:00  --------------------------------------------------------  IN: 1260 mL / OUT: 1620 mL / NET: -360 mL    01-28 @ 07:01 - 01-28 @ 12:44  --------------------------------------------------------  IN: 240 mL / OUT: 350 mL / NET: -110 mL    PHYSICAL EXAM:  General: NAD; Alert  HEENT:  NCAT; PERRLA  Neck: No JVD; supple  Respiratory: CTA-b/l  Cardiac: RRR s1s2  Gastrointestinal: BS+, soft, NT/ND , biliary drain in placed   Urologic: No chen  Extremities: No peripheral edema    LABS:                        10.1   12.26 )-----------( 293      ( 28 Jan 2023 07:18 )             33.7     01-28    136  |  103  |  47<H>  ----------------------------<  222<H>  3.9   |  18<L>  |  3.60<H>    Ca    8.7      28 Jan 2023 07:18  Phos  3.6     01-28  Mg     2.0     01-28    TPro  6.3  /  Alb  2.6<L>  /  TBili  0.2  /  DBili  x   /  AST  12  /  ALT  8<L>  /  AlkPhos  118  01-28

## 2023-01-28 NOTE — PROGRESS NOTE ADULT - PROBLEM SELECTOR PLAN 3
Patient with episode of SVT to 140s, though remained hemodynamically stable 1/26 PM  - vagal manuevers did not break arrhythmia  - required RRT --> adenosine 6  - possibly reactive iso infection, also missed AM toprol dose  - may have been component of volume overload given pt was on gentle hydration    Plan:  > toprol 150 qd switched to lopressor 75 BID to ensure patient receives dose  > hold parameters liberalized to SBP > 90  > continue to monitor on telemetry Patient with episode of SVT to 140s, though remained hemodynamically stable 1/26 PM  - vagal manuevers did not break arrhythmia  - appears to be atrial tachycardia; tele strips reviewed, arrhythmia breaks with p wave  - required RRT --> adenosine 6 1/27, 1/26  - possibly reactive iso infection, also missed AM toprol dose  - may have been component of volume overload given pt was on gentle hydration    Plan:  > toprol 150 qd switched to lopressor 50 q6h with liberalized hold parameters to ensure adequate AV diana blockade  > if pt has return of sustained atach can try AV diana agents (lopressor or dilt) if HD stable. Will likely need amio if that fails  > continue to monitor on telemetry

## 2023-01-29 ENCOUNTER — TRANSCRIPTION ENCOUNTER (OUTPATIENT)
Age: 83
End: 2023-01-29

## 2023-01-29 LAB
-  FLUCONAZOLE: SIGNIFICANT CHANGE UP
ALBUMIN SERPL ELPH-MCNC: 2.7 G/DL — LOW (ref 3.3–5)
ALP SERPL-CCNC: 110 U/L — SIGNIFICANT CHANGE UP (ref 40–120)
ALT FLD-CCNC: 7 U/L — LOW (ref 10–45)
ANION GAP SERPL CALC-SCNC: 12 MMOL/L — SIGNIFICANT CHANGE UP (ref 5–17)
AST SERPL-CCNC: 10 U/L — SIGNIFICANT CHANGE UP (ref 10–40)
BILIRUB SERPL-MCNC: 0.1 MG/DL — LOW (ref 0.2–1.2)
BUN SERPL-MCNC: 50 MG/DL — HIGH (ref 7–23)
CALCIUM SERPL-MCNC: 9 MG/DL — SIGNIFICANT CHANGE UP (ref 8.4–10.5)
CHLORIDE SERPL-SCNC: 105 MMOL/L — SIGNIFICANT CHANGE UP (ref 96–108)
CO2 SERPL-SCNC: 20 MMOL/L — LOW (ref 22–31)
CREAT SERPL-MCNC: 3.23 MG/DL — HIGH (ref 0.5–1.3)
EGFR: 14 ML/MIN/1.73M2 — LOW
GLUCOSE BLDC GLUCOMTR-MCNC: 153 MG/DL — HIGH (ref 70–99)
GLUCOSE BLDC GLUCOMTR-MCNC: 165 MG/DL — HIGH (ref 70–99)
GLUCOSE BLDC GLUCOMTR-MCNC: 257 MG/DL — HIGH (ref 70–99)
GLUCOSE BLDC GLUCOMTR-MCNC: 267 MG/DL — HIGH (ref 70–99)
GLUCOSE SERPL-MCNC: 229 MG/DL — HIGH (ref 70–99)
HCT VFR BLD CALC: 33.7 % — LOW (ref 34.5–45)
HGB BLD-MCNC: 10.2 G/DL — LOW (ref 11.5–15.5)
MAGNESIUM SERPL-MCNC: 2 MG/DL — SIGNIFICANT CHANGE UP (ref 1.6–2.6)
MCHC RBC-ENTMCNC: 29.8 PG — SIGNIFICANT CHANGE UP (ref 27–34)
MCHC RBC-ENTMCNC: 30.3 GM/DL — LOW (ref 32–36)
MCV RBC AUTO: 98.5 FL — SIGNIFICANT CHANGE UP (ref 80–100)
METHOD TYPE: SIGNIFICANT CHANGE UP
NRBC # BLD: 0 /100 WBCS — SIGNIFICANT CHANGE UP (ref 0–0)
PHOSPHATE SERPL-MCNC: 4.5 MG/DL — SIGNIFICANT CHANGE UP (ref 2.5–4.5)
PLATELET # BLD AUTO: 315 K/UL — SIGNIFICANT CHANGE UP (ref 150–400)
POTASSIUM SERPL-MCNC: 4 MMOL/L — SIGNIFICANT CHANGE UP (ref 3.5–5.3)
POTASSIUM SERPL-SCNC: 4 MMOL/L — SIGNIFICANT CHANGE UP (ref 3.5–5.3)
PROT SERPL-MCNC: 6.4 G/DL — SIGNIFICANT CHANGE UP (ref 6–8.3)
RBC # BLD: 3.42 M/UL — LOW (ref 3.8–5.2)
RBC # FLD: 16.3 % — HIGH (ref 10.3–14.5)
SARS-COV-2 RNA SPEC QL NAA+PROBE: SIGNIFICANT CHANGE UP
SODIUM SERPL-SCNC: 137 MMOL/L — SIGNIFICANT CHANGE UP (ref 135–145)
WBC # BLD: 13.03 K/UL — HIGH (ref 3.8–10.5)
WBC # FLD AUTO: 13.03 K/UL — HIGH (ref 3.8–10.5)

## 2023-01-29 PROCEDURE — 99233 SBSQ HOSP IP/OBS HIGH 50: CPT | Mod: GC

## 2023-01-29 PROCEDURE — 99232 SBSQ HOSP IP/OBS MODERATE 35: CPT

## 2023-01-29 RX ORDER — BUMETANIDE 0.25 MG/ML
1 INJECTION INTRAMUSCULAR; INTRAVENOUS
Refills: 0 | Status: DISCONTINUED | OUTPATIENT
Start: 2023-01-29 | End: 2023-01-30

## 2023-01-29 RX ORDER — FLUCONAZOLE 150 MG/1
200 TABLET ORAL EVERY 24 HOURS
Refills: 0 | Status: DISCONTINUED | OUTPATIENT
Start: 2023-01-29 | End: 2023-02-07

## 2023-01-29 RX ADMIN — Medication 50 MILLIGRAM(S): at 21:40

## 2023-01-29 RX ADMIN — APIXABAN 2.5 MILLIGRAM(S): 2.5 TABLET, FILM COATED ORAL at 17:14

## 2023-01-29 RX ADMIN — INSULIN GLARGINE 15 UNIT(S): 100 INJECTION, SOLUTION SUBCUTANEOUS at 21:40

## 2023-01-29 RX ADMIN — Medication 2: at 17:13

## 2023-01-29 RX ADMIN — Medication 650 MILLIGRAM(S): at 05:00

## 2023-01-29 RX ADMIN — GABAPENTIN 200 MILLIGRAM(S): 400 CAPSULE ORAL at 05:00

## 2023-01-29 RX ADMIN — Medication 5 UNIT(S): at 17:13

## 2023-01-29 RX ADMIN — Medication 2: at 21:40

## 2023-01-29 RX ADMIN — Medication 650 MILLIGRAM(S): at 06:00

## 2023-01-29 RX ADMIN — ATORVASTATIN CALCIUM 40 MILLIGRAM(S): 80 TABLET, FILM COATED ORAL at 21:40

## 2023-01-29 RX ADMIN — PIPERACILLIN AND TAZOBACTAM 25 GRAM(S): 4; .5 INJECTION, POWDER, LYOPHILIZED, FOR SOLUTION INTRAVENOUS at 17:19

## 2023-01-29 RX ADMIN — PIPERACILLIN AND TAZOBACTAM 25 GRAM(S): 4; .5 INJECTION, POWDER, LYOPHILIZED, FOR SOLUTION INTRAVENOUS at 05:01

## 2023-01-29 RX ADMIN — Medication 650 MILLIGRAM(S): at 20:44

## 2023-01-29 RX ADMIN — Medication 2: at 12:01

## 2023-01-29 RX ADMIN — GABAPENTIN 200 MILLIGRAM(S): 400 CAPSULE ORAL at 21:40

## 2023-01-29 RX ADMIN — GABAPENTIN 200 MILLIGRAM(S): 400 CAPSULE ORAL at 14:02

## 2023-01-29 RX ADMIN — APIXABAN 2.5 MILLIGRAM(S): 2.5 TABLET, FILM COATED ORAL at 05:00

## 2023-01-29 RX ADMIN — CHLORHEXIDINE GLUCONATE 1 APPLICATION(S): 213 SOLUTION TOPICAL at 07:48

## 2023-01-29 RX ADMIN — Medication 50 MILLIGRAM(S): at 17:14

## 2023-01-29 RX ADMIN — Medication 50 MILLIGRAM(S): at 05:00

## 2023-01-29 RX ADMIN — BUMETANIDE 1 MILLIGRAM(S): 0.25 INJECTION INTRAMUSCULAR; INTRAVENOUS at 14:14

## 2023-01-29 RX ADMIN — Medication 6: at 07:47

## 2023-01-29 RX ADMIN — Medication 650 MILLIGRAM(S): at 19:44

## 2023-01-29 RX ADMIN — Medication 5 UNIT(S): at 12:00

## 2023-01-29 RX ADMIN — FLUCONAZOLE 200 MILLIGRAM(S): 150 TABLET ORAL at 17:19

## 2023-01-29 RX ADMIN — Medication 5 UNIT(S): at 07:47

## 2023-01-29 RX ADMIN — Medication 50 MILLIGRAM(S): at 12:01

## 2023-01-29 RX ADMIN — Medication 81 MILLIGRAM(S): at 12:01

## 2023-01-29 NOTE — PROGRESS NOTE ADULT - ASSESSMENT
ASSESSMENT:  Patient is a 80 year old F with a PMHx of HTN, HLD, T2DM, HFpEF (EF 65% X/2022), breast cancer s/p R partial mastectomy, rectal carcinoma s/p resection, CAD s/p CABG (11/2020) who presents for 1 day of progressively worsening abdominal pain associated with nausea and NBNB vomiting. 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  low nephron mass   non oliguric harleen likely in the view of relative  hypotension   hyponatremia-- improving   CVS----BP soft at times, stable at present    GI:  acute cholecystitis --- ERCP with stent placement  1/20/23  cholecystotomy tube placement  1/25/23 perc     RECOMMENDATION:  1)Renal: cr improving   non oliguric harleen  monitor resp status   avoid nephrotoxic medication  dose all medications for gfr ~10-15 ml/min   cont holding diuretics   daily bmp  2)CVS:  BP soft at times, stable,   on metoprolol 50 mg q6h ( with holding parameters)   3) ID: on IV capsofungin and zosyn consider switching, abdominal fluid yeast growth seen ID on board.  4) GI on board      [facility]  Office: [phone]

## 2023-01-29 NOTE — PROGRESS NOTE ADULT - ATTENDING COMMENTS
82F pmhx of DM2, HLD, HFpEF, breast cancer w/ mastectomy, Eliquis for DVT long ago, presented with ab pain admitted for acute cholecystitis with choledocholithiasias s/p ERCP with sphincterotomy and cbd stented and per rissa drain placed 1/25 with biliary fluid growing Candida. On zosyn and fluconazole. c/b SVT s/p adenosine, on metoprolol 50q6hr. EP following. Restarted Eliquis for Afib.    Endorsed increased SOB with cxr yesterday mild increase in pulm edema (home bumex held and had ep of evt 1/27). On telemetry, remains in NSR with no recurrent SVT. Will start bumex 1mg BID    d/w HS4    Jayla Lerma MD  Division of Hospital Medicine  Available on [employer] Teams . 82F pmhx of DM2, HLD, HFpEF, breast cancer w/ mastectomy, Eliquis for DVT long ago, presented with ab pain admitted for acute cholecystitis with choledocholithiasias s/p ERCP with sphincterotomy and cbd stented and per rissa drain placed 1/25 with biliary fluid growing Candida. On zosyn and caspo. c/b SVT s/p adenosine, on metoprolol 50q6hr. EP following. Restarted Eliquis for Afib.    Endorsed increased SOB with cxr yesterday mild increase in pulm edema (home bumex held and had ep of evt 1/27). On telemetry, remains in NSR with no recurrent SVT. Will start bumex 1mg BID    d/w HS4    Jayla Lerma MD  Division of Hospital Medicine  Available on [employer] Teams .

## 2023-01-29 NOTE — PROGRESS NOTE ADULT - PROBLEM SELECTOR PLAN 1
Patient presenting with 1 week of abdominal pain, nausea, and vomiting  - Initial CT A/P: acute cholecystitis with 1.2 cm common bile duct dilatation  - per surgery, not a surgical candidate given comorbidities   - s/p ERCP 1/20 : choledocholithiasis w/ large stone w/ sphincterotomy and biliary stent placement.   - patient now with recurrent abdominal pain, and leukocytosis, repeat CT A/P and MRI non con concerning for multiloculated fluid collection around the neck of the gallbladder, possible perf  - s/p perc rissa  - abdominal fluid cx growing gram positive rods and candida albicans  - concerning for biliary candidiasis    Plan:  > continue with zosyn 7 days after source control (1/25 - )   > continue with caspofungin per ID recs  > pain/nausea control with tylenol and maalox  > Patient will need repeat ERCP in 2-3 months for stent removal

## 2023-01-29 NOTE — DISCHARGE NOTE PROVIDER - PROVIDER TOKENS
PROVIDER:[TOKEN:[[id number]],FOLLOWUP:[2 weeks],ESTABLISHEDPATIENT:[T]],PROVIDER:[TOKEN:[[id number]],FOLLOWUP:[2 weeks],ESTABLISHEDPATIENT:[T]],PROVIDER:[TOKEN:[[id number]],FOLLOWUP:[2 weeks]] PROVIDER:[TOKEN:[[id number]],FOLLOWUP:[2 weeks],ESTABLISHEDPATIENT:[T]],PROVIDER:[TOKEN:[[id number]],FOLLOWUP:[2 weeks],ESTABLISHEDPATIENT:[T]],PROVIDER:[TOKEN:[[id number]],FOLLOWUP:[2 weeks]],PROVIDER:[TOKEN:[[id number]],FOLLOWUP:[1 week],ESTABLISHEDPATIENT:[T]]

## 2023-01-29 NOTE — PROGRESS NOTE ADULT - ASSESSMENT
EP ATTENDING    Agree with above. In brief she is a 83 y/o female PMH advanced CKD, hypertension, hyperlipidemia, diabetes, CAD s/p CABG (11/2020) with normal LVEF admitted on 1/18/23 with acute cholecystitis requiring ERCP and percutaneous drain. Yesterday she went into atrial flutter (difficult to tell if typical or atypical) and has since terminated to NSR. Her other medical history includes right breast CA s/p mastectomy (1989), rectal CA (2015)    Plan  -continue eliquis for lifelong anticoagulation for stroke prevention  -continue metoprolol 50 q6h  -if AF/AFL recurs will start amiodarone  -poor candidate for ablation at this time

## 2023-01-29 NOTE — PROGRESS NOTE ADULT - SUBJECTIVE AND OBJECTIVE BOX
CC: Patient is a 82y old  Female who presents with a chief complaint of acute cholecystitis, DKA (29 Jan 2023 11:47)    ID following for acute cholecystitis    Interval History/ROS: Patient remains on 1L NC. No abd pain. Biliary cathter in place with bilious drainage. No fevers. Rising WBC.    Rest of ROS negative.    Allergies  CT scan dye (Hives)  penicillin (Unknown)    ANTIMICROBIALS:  caspofungin IVPB    caspofungin IVPB 50 every 24 hours  piperacillin/tazobactam IVPB.. 3.375 every 12 hours    OTHER MEDS:  acetaminophen     Tablet .. 650 milliGRAM(s) Oral every 6 hours PRN  albuterol/ipratropium for Nebulization 3 milliLiter(s) Nebulizer every 6 hours PRN  aluminum hydroxide/magnesium hydroxide/simethicone Suspension 30 milliLiter(s) Oral every 6 hours PRN  apixaban 2.5 milliGRAM(s) Oral two times a day  aspirin  chewable 81 milliGRAM(s) Oral daily  atorvastatin 40 milliGRAM(s) Oral at bedtime  buMETAnide 1 milliGRAM(s) Oral two times a day  chlorhexidine 4% Liquid 1 Application(s) Topical <User Schedule>  dextrose 5%. 1000 milliLiter(s) IV Continuous <Continuous>  dextrose 5%. 1000 milliLiter(s) IV Continuous <Continuous>  dextrose 50% Injectable 25 Gram(s) IV Push once  dextrose 50% Injectable 12.5 Gram(s) IV Push once  dextrose 50% Injectable 25 Gram(s) IV Push once  dextrose Oral Gel 15 Gram(s) Oral once PRN  gabapentin 200 milliGRAM(s) Oral three times a day  glucagon  Injectable 1 milliGRAM(s) IntraMuscular once  indomethacin Suppository 100 milliGRAM(s) Rectal once  insulin glargine Injectable (LANTUS) 15 Unit(s) SubCutaneous at bedtime  insulin lispro (ADMELOG) corrective regimen sliding scale   SubCutaneous at bedtime  insulin lispro (ADMELOG) corrective regimen sliding scale   SubCutaneous three times a day before meals  insulin lispro Injectable (ADMELOG) 5 Unit(s) SubCutaneous three times a day before meals  metoprolol tartrate 50 milliGRAM(s) Oral every 6 hours  sodium chloride 0.9% lock flush 10 milliLiter(s) IV Push every 1 hour PRN    PE:    Vital Signs Last 24 Hrs  T(C): 36.4 (29 Jan 2023 13:57), Max: 36.6 (29 Jan 2023 04:58)  T(F): 97.6 (29 Jan 2023 13:57), Max: 97.9 (29 Jan 2023 04:58)  HR: 70 (29 Jan 2023 13:57) (66 - 73)  BP: 99/57 (29 Jan 2023 13:57) (99/57 - 129/75)  BP(mean): --  RR: 17 (29 Jan 2023 13:57) (17 - 19)  SpO2: 98% (29 Jan 2023 13:57) (96% - 99%)    Parameters below as of 29 Jan 2023 13:57  Patient On (Oxygen Delivery Method): nasal cannula  O2 Flow (L/min): 1    Gen: AOx3, NAD  CV: S1+S2 normal, no murmurs  Resp: Decreased in bases  Abd: Soft, nontender, +BS, cholecystostomy tube with bilious drainage  Ext: No LE edema, no wounds  : No Dowling  IV/Skin: No thrombophlebitis  Neuro: no focal deficits    LABS:                          10.2   13.03 )-----------( 315      ( 29 Jan 2023 07:25 )             33.7       01-29    137  |  105  |  50<H>  ----------------------------<  229<H>  4.0   |  20<L>  |  3.23<H>    Ca    9.0      29 Jan 2023 07:25  Phos  4.5     01-29  Mg     2.0     01-29    TPro  6.4  /  Alb  2.7<L>  /  TBili  0.1<L>  /  DBili  x   /  AST  10  /  ALT  7<L>  /  AlkPhos  110  01-29          MICROBIOLOGY:  v  Abdominal Fl Abdominal Fluid  01-25-23   Rare Candida albicans  --  Candida albicans      .Blood Blood-Peripheral  01-22-23   No Growth Final  --  --      .Blood Blood-Peripheral  01-22-23   No Growth Final  --  --      .Blood Blood-Venous  01-20-23   No Growth Final  --  --    Rapid RVP Result: Philtec (01-23 @ 07:26)    RADIOLOGY:    < from: Xray Chest 1 View- PORTABLE-Routine (Xray Chest 1 View- PORTABLE-Routine .) (01.28.23 @ 19:23) >  IMPRESSION:    Mild interval increase in pulmonary edema and small bilateral pleural   effusions since 1/21/2023.    < end of copied text >

## 2023-01-29 NOTE — DISCHARGE NOTE PROVIDER - CARE PROVIDER_API CALL
Katie Espinoza (DO)  Internal Medicine  [facility], [address] [place] [address]  Phone: [phone]  Fax: [phone]  Established Patient  Follow Up Time: 2 weeks    Sergey Winchester (DO)  Cardiovascular Disease; Internal Medicine; Nuclear Cardiology  [address] [place] [address], [address]  [place] [address]  Phone: [phone]  Fax: [phone]  Established Patient  Follow Up Time: 2 weeks    Odilon Roque)  Cardiac Electrophysiology; Cardiovascular Disease; Nuclear Cardiology  [address] [place] [address] [address]  [place] [address]  Phone: [phone]  Fax: [phone]  Follow Up Time: 2 weeks   Katie Espinoza (DO)  Internal Medicine  [facility], [address] [place] [address]  Phone: [phone]  Fax: [phone]  Established Patient  Follow Up Time: 2 weeks    Sergey Winchester (DO)  Cardiovascular Disease; Internal Medicine; Nuclear Cardiology  [address] [facility], [address]  [place] [address]  Phone: [phone]  Fax: [phone]  Established Patient  Follow Up Time: 2 weeks    Oidlon Roque)  Cardiac Electrophysiology; Cardiovascular Disease; Nuclear Cardiology  [address] [facility] [address]  [place] [address]  Phone: [phone]  Fax: [phone]  Follow Up Time: 2 weeks    Dk Fonseca)  Internal Medicine  [address] [place] [address]  Phone: [phone]  Fax: [phone]  Established Patient  Follow Up Time: 1 week

## 2023-01-29 NOTE — DISCHARGE NOTE PROVIDER - NSDCFUSCHEDAPPT_GEN_ALL_CORE_FT
Joseph Blanchard  [facility]  GASTR[address]  Scheduled Appointment: 02/21/2023

## 2023-01-29 NOTE — PROGRESS NOTE ADULT - SUBJECTIVE AND OBJECTIVE BOX
DATE OF SERVICE: 01-29-23 @ 15:32    Patient is a 82y old  Female who presents with a chief complaint of acute cholecystitis, DKA (29 Jan 2023 15:16)      INTERVAL HISTORY: no complaints. d/c per primary team     REVIEW OF SYSTEMS:  CONSTITUTIONAL: No weakness  EYES/ENT: No visual changes;  No throat pain   NECK: No pain or stiffness  RESPIRATORY: No cough, wheezing; No shortness of breath  CARDIOVASCULAR: No chest pain or palpitations  GASTROINTESTINAL: No abdominal  pain. No nausea, vomiting, or hematemesis  GENITOURINARY: No dysuria, frequency or hematuria  NEUROLOGICAL: No stroke like symptoms  SKIN: No rashes      MEDICATIONS:  buMETAnide 1 milliGRAM(s) Oral two times a day  metoprolol tartrate 50 milliGRAM(s) Oral every 6 hours        PHYSICAL EXAM:  T(C): 36.4 (01-29-23 @ 13:57), Max: 36.6 (01-29-23 @ 04:58)  HR: 70 (01-29-23 @ 13:57) (66 - 73)  BP: 99/57 (01-29-23 @ 13:57) (99/57 - 129/75)  RR: 17 (01-29-23 @ 13:57) (17 - 19)  SpO2: 98% (01-29-23 @ 13:57) (96% - 99%)  Wt(kg): --  I&O's Summary    28 Jan 2023 07:01  -  29 Jan 2023 07:00  --------------------------------------------------------  IN: 1130 mL / OUT: 1920 mL / NET: -790 mL    29 Jan 2023 07:01  -  29 Jan 2023 15:32  --------------------------------------------------------  IN: 420 mL / OUT: 1600 mL / NET: -1180 mL          Appearance: In no distress	  HEENT:    PERRL, EOMI	  Cardiovascular:  S1 S2, No JVD  Respiratory: Lungs clear to auscultation	  Gastrointestinal:  Soft, Non-tender, + BS	  Vascularature:  No edema of LE  Psychiatric: Appropriate affect   Neuro: no acute focal deficits                               10.2   13.03 )-----------( 315      ( 29 Jan 2023 07:25 )             33.7     01-29    137  |  105  |  50<H>  ----------------------------<  229<H>  4.0   |  20<L>  |  3.23<H>    Ca    9.0      29 Jan 2023 07:25  Phos  4.5     01-29  Mg     2.0     01-29    TPro  6.4  /  Alb  2.7<L>  /  TBili  0.1<L>  /  DBili  x   /  AST  10  /  ALT  7<L>  /  AlkPhos  110  01-29        Labs personally reviewed      ASSESSMENT/PLAN: 	    80 year old woman with CAD s/p CABG in 2020, prior DVT on Eliquis, HTN, and compensated diastolic CHF presents with acute cholecystitis.       #Post-operative risk evaluation-  S/p ERCP with CBD stent. Tolerate procedure well.   Ms. Batista displays no signs or symptoms of acute coronary ischemia or decompensated CHF.  Admission EKG is sinus with pre-existing anteroseptal q-waves.  Recent echo noted- normal LV systolic function with no hemodynamically significant valvular disease.  s/p drain placement vai IR 1/25    #Compensated diastolic CHF-  Resume PO Diuretics following procedure pending Renal in agreement    #CAD s/p CABG-  No signs of active ischemic.  c/w ASA and statin  - 1/26 with c/o epigastric burning a/b episodes of emesis. ECG performed, no ischemic changes noted.   -1/28 c/o chest pain and epigastric pain similar to 2 days ago. Repeat EKG with no ischemic changes. Trop wnl.  This is most likely noncardiac in nature given patient's recent cholecystitis     #Sepsis   - 2/2 acute cholecystitis   - S/p CBD stent  - s/p IR drain 1/25  - Afebrile, WBC downtrending    #SVT  - 1/26 patent HR in 130s while sleeping. Patient missed am dose of Metoprolol 150mg PO.  - Resume Metoprolol  - Adenosine 6mg IV x1 with termination of SVT  - cont to monitor on tele  - 1/27 with recurrent SVT s/p adenosine today   - EP consulted. Likely A-Tach. Recommend c/w Metoprolol 75mg PO BID and uptitrate as tolerated.               MAINE Mancia DO [facility]  Cardiovascular Medicine  [address] [place] [address], [address]  Office: [phone]  Available via call/text on [employer] Teams

## 2023-01-29 NOTE — DISCHARGE NOTE PROVIDER - NSDCMRMEDTOKEN_GEN_ALL_CORE_FT
amLODIPine 5 mg oral tablet: 1 tab(s) orally once a day  apixaban 2.5 mg oral tablet: 1 tab(s) orally 2 times a day  aspirin 81 mg oral tablet, chewable: 1 tab(s) orally once a day  atorvastatin 40 mg oral tablet: 1 tab(s) orally once a day (at bedtime)  bumetanide 1 mg oral tablet: 1 tab(s) orally once a day  Gralise 600 mg/24 hours oral tablet, extended release: 1 tab(s) orally once a day  Prandin 1 mg oral tablet: 1 tab(s) orally 3 times a day before meals  spironolactone 25 mg oral tablet: 0.5 tab(s) orally once a day  Toprol- mg oral tablet, extended release: 1.5 tab(s) orally once a day   acetaminophen 325 mg oral tablet: 2 tab(s) orally every 6 hours, As needed, Mild Pain (1 - 3), Moderate Pain (4 - 6)  aluminum hydroxide-magnesium hydroxide 200 mg-200 mg/5 mL oral suspension: 30 milliliter(s) orally every 6 hours, As needed, Dyspepsia  apixaban 2.5 mg oral tablet: 1 tab(s) orally 2 times a day  aspirin 81 mg oral tablet, chewable: 1 tab(s) orally once a day  atorvastatin 40 mg oral tablet: 1 tab(s) orally once a day (at bedtime)  bumetanide 1 mg oral tablet: 1 tab(s) orally once a day  fluconazole 200 mg oral tablet: 1 tab(s) orally every 24 hours  gabapentin 100 mg oral capsule: 2 cap(s) orally once a day  insulin lispro 100 units/mL injectable solution: 7  injectable 3 times a day (before meals)  Lantus 100 units/mL subcutaneous solution: 21 unit(s) subcutaneous once a day (at bedtime)  metoprolol tartrate 100 mg oral tablet: 1 tab(s) orally every 12 hours  pantoprazole 40 mg oral delayed release tablet: 1 tab(s) orally once a day (before a meal)  sodium bicarbonate 650 mg oral tablet: 1 tab(s) orally 2 times a day   acetaminophen 325 mg oral tablet: 2 tab(s) orally every 6 hours, As needed, Mild Pain (1 - 3), Moderate Pain (4 - 6)  aluminum hydroxide-magnesium hydroxide 200 mg-200 mg/5 mL oral suspension: 30 milliliter(s) orally every 6 hours, As needed, Dyspepsia  amoxicillin-clavulanate 875 mg-125 mg oral tablet: 1 tab(s) orally every 8 hours  apixaban 2.5 mg oral tablet: 1 tab(s) orally 2 times a day  aspirin 81 mg oral tablet, chewable: 1 tab(s) orally once a day  atorvastatin 40 mg oral tablet: 1 tab(s) orally once a day (at bedtime)  bumetanide 1 mg oral tablet: 1 tab(s) orally once a day  fluconazole 200 mg oral tablet: 1 tab(s) orally every 24 hours  gabapentin 100 mg oral capsule: 2 cap(s) orally once a day  insulin lispro 100 units/mL injectable solution: 7  injectable 3 times a day (before meals)  Lantus 100 units/mL subcutaneous solution: 21 unit(s) subcutaneous once a day (at bedtime)  metoprolol tartrate 100 mg oral tablet: 1 tab(s) orally every 12 hours  pantoprazole 40 mg oral delayed release tablet: 1 tab(s) orally once a day (before a meal)  sodium bicarbonate 650 mg oral tablet: 1 tab(s) orally 2 times a day   acetaminophen 325 mg oral tablet: 2 tab(s) orally every 6 hours, As needed, Mild Pain (1 - 3), Moderate Pain (4 - 6)  aluminum hydroxide-magnesium hydroxide 200 mg-200 mg/5 mL oral suspension: 30 milliliter(s) orally every 6 hours, As needed, Dyspepsia  amoxicillin-clavulanate 500 mg-125 mg oral tablet: 1 tab(s) orally every 12 hours  apixaban 2.5 mg oral tablet: 1 tab(s) orally 2 times a day  aspirin 81 mg oral tablet, chewable: 1 tab(s) orally once a day  atorvastatin 40 mg oral tablet: 1 tab(s) orally once a day (at bedtime)  bumetanide 1 mg oral tablet: 1 tab(s) orally once a day  fluconazole 200 mg oral tablet: 1 tab(s) orally every 24 hours  gabapentin 100 mg oral capsule: 2 cap(s) orally once a day  insulin lispro 100 units/mL injectable solution: 7  injectable 3 times a day (before meals)  Lantus 100 units/mL subcutaneous solution: 21 unit(s) subcutaneous once a day (at bedtime)  metoprolol tartrate 100 mg oral tablet: 1 tab(s) orally every 12 hours  pantoprazole 40 mg oral delayed release tablet: 1 tab(s) orally once a day (before a meal)  sodium bicarbonate 650 mg oral tablet: 1 tab(s) orally 2 times a day

## 2023-01-29 NOTE — DISCHARGE NOTE PROVIDER - HOSPITAL COURSE
Patient is a 80 year old F with a PMHx of HTN, HLD, T2DM, HFpEF (EF 65% X/2022), breast cancer s/p R partial mastectomy, rectal carcinoma s/p resection, CAD s/p CABG (11/2020) who presents for 1 day of progressively worsening abdominal pain associated with nausea and NBNB vomiting. The patient's daughter reports that the patient had one self resolving episode of abdominal pain on 1/15, which they had initially attributed to gas and bloating. The pain returned again on the night prior to arrival and progressively worsened throughout the night. The patient denies any fever, chills, shortness of breath, chest pain, constipation, or diarrhea.    ED course: Vitals stable upon arrival (HR 78, /74, SpO2 95% on RA, afebrile). Labs were WBC 16.6 with L shift, AG 19, BUN 34, Cr 1.77, serum glucose of 450, BHB of 2, , pH of 7.38, lactate of 2.6. CT A/P with concern for acute cholecystitis with choledocholithiasis, but deemed not a surgical candidate per surgery. The patient was given 1x zosyn for cholecystitis, as well as 750cc of NS and 4U regular insulin for mild DKA. The patient also received pepcid, maalox, zofran, and morphine for abdominal pain and nausea.     Hospital Course:  The patient was started on Zosyn for antibiotic coverage for acute cholecystitis with choledocholithiasis. The patient was evaluated by the surgical team who deemed the patient as not a good operative candidate. The patient underwent ERCP for stent placement in her CBD but continued to have abdominal pain and an increased leukocytosis thereafter. The patient then underwent percutaneous cholecystostomy for drainage of a persistent multiloculated fluid collection that grew candida and gram positive rods. The patient was then started on caspofungin per ID recommendations. The patient's hospital course was complicated by TAO on CKD which was determined to be likely 2/2 to ATN in the setting of active infection and relative hypotension. The ATN and TAO improved overtime with gentle hydration and then as the patient began tolerating more po intake. The patient went into multiple episodes of SVT which required administration of adenosine x 2. The patient was evaluated by cardiology and electrophysiology who determined that these arrhythmias were likely atypical atrial flutter. The patient's home metoprolol dose was split into several smaller doses to provide adequate coverage and to prevent future tachyarrhythmias. The patient was already on eliquis for a history of DVT and so this was continued for lifelong anticoagulation given her new atrial flutter. The patient was determined to be a poor candidate for ablation. The patient then developed persistent mild hypoxia and was noted to have wheezing on exam with findings of pulmonary edema on CXR. As a result, the patient was restarted on her home dose of bumex. Discharge Summary     Admission diagnoses:   cholecystitis    Discharge diagnoses:   cholecystitis    Hospital Course:   For full details, please see H&P, progress notes, consult notes and ancillary notes. Briefly, 82F history of HTN, HLD, DM2, HFpEF (EF 65%), CAD s/p CABG, Breast Ca s/p R partial mastectomy, Rectal Ca s/p resection presenting with abdominal pain x 1 week found to have acute cholecystitis, not a surgical candidate. S/p ERCP with stent placement course c/b gallbladder perforation therefore s/p perc rissa w/ fluid c/f candida and GPCs on zosyn, fluconazole per ID. Perc rissa dislodged on 2/1/23, however no evidence of sepsis and with HIDA negative for cholecystitis, perc rissa was not replaced per IR. Additionally with recurrent SVT thought to be atypical Afib per EP controlled on metoprolol. 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 New hematuria 2/2/23 with spontaneous resolution on 2/4/23, eliquis resumed without issue.    On day of discharge, patient is clinically stable with no new exam findings or acute symptoms compared to prior. The patient was seen by the attending physician on the date of discharge and deemed stable and acceptable for discharge. The patient's chronic medical conditions were treated accordingly per the patient's home medication regimen. The patient's medication reconciliation (with changes made to chronic medications), follow up appointments, discharge orders, instructions, and significant lab and diagnostic studies are as noted.     Discharge follow up action items:     1. Follow up with  - PCP in 1 - 2 weeks  - ID in 1 - 2 weeks  - GI in 2 - 3 months    Patient's ordered code status: Full    Patient disposition: RAMIRO

## 2023-01-29 NOTE — DISCHARGE NOTE PROVIDER - NSDCCPTREATMENT_GEN_ALL_CORE_FT
PRINCIPAL PROCEDURE  Procedure: CT  Findings and Treatment: IMPRESSION:  Cholelithiasis with multiple small calcified gallstones within the low   inserting cystic duct. Persistent wall thickening of the gallbladder   neck. Persistent pericholecystic inflammation surrounding gallbladder   neck and cystic duct. Mild decrease in size of the pericholecystic   probable biloma as described< from: CT Abdomen and Pelvis No Cont (02.03.23 @ 10:10) >        SECONDARY PROCEDURE  Procedure: Hepatobiliary scan  Findings and Treatment:   < end of copied text >  FINDINGS: There is prompt and homogeneous uptake of radiopharmaceutical   by the hepatocytes. Bowel activity is seen by 10 minutes into the study   with gallbladder activity visualization by about 50 minutes into the   study. Activity in the gallbladder is smaller than its configuration on   the CT due in part to vaguely seen filling defects related to the   multiple gallstones and its delayed visualization most likely related to   chronic cholecystitis. There is evidence of enterogastric reflux. There   is good clearance of activity from the liver by the end of the study and   all the excreted activity can be seen within the bowel loops with no   evidence of extraluminal spillage.  IMPRESSION:  1. Patent cystic duct not suggestive of acute cholecystitis.  2. Normal hepatic uptake and clearance of radiopharmaceutical.< from: [facility] Hepatobiliary Imaging (02.03.23 @ 20:48) >

## 2023-01-29 NOTE — DISCHARGE NOTE PROVIDER - CARE PROVIDERS DIRECT ADDRESSES
[email or web address],DirectAddress_Unknown [email or web address],[email or web address]

## 2023-01-29 NOTE — PROGRESS NOTE ADULT - ASSESSMENT
82 year old female with HTN, HLD, DM2, HFpEF, breast cancer s/p R partial mastectomy, rectal carcinoma s/p resection, CAD/ CABG presenting with abd pain, nausea and vomiting, found to have acute cholecystitis with choledocholithiasis. Underwent ERCP with biliary stent placement with repeat CT A/P with new fluid collection near the GB neck s/p perc rissa on 1/25 - culture with yeast. Leukocytosis better.    Recommend:  #Acute cholecystitis with choledocholithiasis/ abscess  -Continue zosyn, patient tolerating  -Change caspo to fluconazole 200 mg po q 24 hours  -Trend WBC, may need re-imaging if continues to rise  -Monitor for fevers    Dk Fonseca MD  Available through MS Teams  If no response, or after 5pm/weekends, call [phone]

## 2023-01-29 NOTE — PROGRESS NOTE ADULT - ASSESSMENT
Patient is a 82 year old F with a PMHx of HTN, HLD, T2DM, HFpEF (EF 65% X/2022), breast cancer s/p R partial mastectomy, rectal carcinoma s/p resection, CAD s/p CABG who presents for 1 week of intermittent abdominal pain associated with nausea and vomiting, found to have acute cholecystitis with choledocholithiasis. Patient initially underwent ERCP with CBD placement but was found to have a new fluid collection adjacent to the gallbladder neck, now s/p percutaneous cholecystostomy, with fluid cx concerning for biliary candidiasis. Hospital course c/b recurrent SVT, likely A flutter.

## 2023-01-29 NOTE — PROVIDER CONTACT NOTE (OTHER) - ACTION/TREATMENT ORDERED:
ice chips provided to pt ice chips provided to pt, pt encouraged to drink water. Pt observed sleeping shortly after and sleeping through the night, prn tylenol given this am for RUQ pain. ice chips provided to, pt encouraged to drink water. Pt reports improvement of symptoms w/ ice chips. Pt observed to be sleeping shortly after and throughout night. Tylenol given this am for RUQ pain

## 2023-01-29 NOTE — DISCHARGE NOTE PROVIDER - NSDCCPCAREPLAN_GEN_ALL_CORE_FT
PRINCIPAL DISCHARGE DIAGNOSIS  Diagnosis: Cholecystitis  Assessment and Plan of Treatment: You presented to the emergency room with abdominal pain, nausea, and vomiting and were found to have cholecystitis, or inflammation of your gallbladder and its connecting ducts. You underwent a procedure with the gastroenterology team called an ERCP and they placed a stent in your common bile duct. You were later found to have another source of infection and fluid collection near the gallbladder and then also had a drain placed via a procedure known as a percutaneous cholecystostomy. You were treated with antibiotics as well as an antifungal medication because the fluid that was sent grew bacteria as well as a fungus known as Candida. Please continue to take caspofungin and augmentin for X days.  Please follow up with the gastroenterologist 1-2 months after discharge for another procedure to remove the stent they placed in your common bile duct.  Please follow up with the interventional radiolgoist in 1 month upon discharge for drain removal.        SECONDARY DISCHARGE DIAGNOSES  Diagnosis: Supraventricular tachycardia  Assessment and Plan of Treatment: You were found to have multiple instances of a new arrhytmia when your heart began beating very quickly. You did not have any symptoms during these episodes and your blod pressure remained stable. You are already on a medication called metoprolol which slows your heart rate and can prevent these episodes. Please continue to take this medication daily, and please follow up with the cardiologist and electrophysiologist for further management. If you have any chest pain, shortness of breath, or sustained palpitations, please return to the emergency room.    Diagnosis: Type 2 diabetes mellitus  Assessment and Plan of Treatment: You were found to have very elevated blood sugars in the emergency room which may have contributed to your nausea and vomiting. Your diabetes is likely not well controlled on oral medications. Please take insulin as prescribed and follow up with your primary care provider for further monitoring    Diagnosis: Acute kidney injury superimposed on CKD  Assessment and Plan of Treatment: You were found to have an acute injury to your kidney known as acute tubular necrosis. This was likely caused by the fact that your blood pressure became low during your infection. Your kidneys began to heal while you were admitted but have not fully returned back to normal. Please follow up with your primary care provider and nephrologist to check your kidney function again, and to adjust your medications as needed.     PRINCIPAL DISCHARGE DIAGNOSIS  Diagnosis: Cholecystitis  Assessment and Plan of Treatment: You presented with abdominal pain and were found to have inflammation of the gallbladder. For this you underwent a procedure with the gastroenterology team called an ERCP and they placed a stent in your common bile duct. You were later found to have another source of infection and fluid collection near the gallbladder and then also had a drain placed via a procedure known as a percutaneous cholecystostomy with interventional radiology. You were treated with antibiotics as well as an antifungal medication because the fluid that was sent grew bacteria as well as a fungus known as Candida. Please continue to take the medications as prescribed.  ||  Please follow up with infectious disease in 1 - 2 weeks for further antibiotic recommendations.   ||  Please follow up with the gastroenterologist 1-2 months after discharge for another procedure to remove the stent they placed in your common bile duct.  ||   If you have recurrent symptoms please call your healthcare provider for further recommendations or come back to the hospital.         SECONDARY DISCHARGE DIAGNOSES  Diagnosis: Supraventricular tachycardia  Assessment and Plan of Treatment: You were found to have multiple instances of a new arrhytmia when your heart began beating very quickly. You did not have any symptoms during these episodes and your blod pressure remained stable. You are already on a medication called metoprolol which slows your heart rate and can prevent these episodes. Please continue to take this medication daily, and please follow up with the cardiologist and electrophysiologist for further management. If you have any chest pain, shortness of breath, or sustained palpitations, please return to the emergency room.    Diagnosis: Acute kidney injury superimposed on CKD  Assessment and Plan of Treatment: You were found to have an acute injury to your kidney known as acute tubular necrosis. This was likely caused by the fact that your blood pressure became low during your infection. Your kidneys began to heal while you were admitted but have not fully returned back to normal. Please follow up with your primary care provider and nephrologist to check your kidney function again, and to adjust your medications as needed.

## 2023-01-29 NOTE — PROGRESS NOTE ADULT - SUBJECTIVE AND OBJECTIVE BOX
no complaints, ROS - .         acetaminophen     Tablet .. 650 milliGRAM(s) Oral every 6 hours PRN  albuterol/ipratropium for Nebulization 3 milliLiter(s) Nebulizer every 6 hours PRN  aluminum hydroxide/magnesium hydroxide/simethicone Suspension 30 milliLiter(s) Oral every 6 hours PRN  apixaban 2.5 milliGRAM(s) Oral two times a day  aspirin  chewable 81 milliGRAM(s) Oral daily  atorvastatin 40 milliGRAM(s) Oral at bedtime  caspofungin IVPB      caspofungin IVPB 50 milliGRAM(s) IV Intermittent every 24 hours  chlorhexidine 4% Liquid 1 Application(s) Topical <User Schedule>  dextrose 5%. 1000 milliLiter(s) IV Continuous <Continuous>  dextrose 5%. 1000 milliLiter(s) IV Continuous <Continuous>  dextrose 50% Injectable 25 Gram(s) IV Push once  dextrose 50% Injectable 12.5 Gram(s) IV Push once  dextrose 50% Injectable 25 Gram(s) IV Push once  dextrose Oral Gel 15 Gram(s) Oral once PRN  gabapentin 200 milliGRAM(s) Oral three times a day  glucagon  Injectable 1 milliGRAM(s) IntraMuscular once  indomethacin Suppository 100 milliGRAM(s) Rectal once  insulin glargine Injectable (LANTUS) 15 Unit(s) SubCutaneous at bedtime  insulin lispro (ADMELOG) corrective regimen sliding scale   SubCutaneous at bedtime  insulin lispro (ADMELOG) corrective regimen sliding scale   SubCutaneous three times a day before meals  insulin lispro Injectable (ADMELOG) 5 Unit(s) SubCutaneous three times a day before meals  metoprolol tartrate 50 milliGRAM(s) Oral every 6 hours  piperacillin/tazobactam IVPB.. 3.375 Gram(s) IV Intermittent every 12 hours  sodium chloride 0.9% lock flush 10 milliLiter(s) IV Push every 1 hour PRN                            10.2   13.03 )-----------( 315      ( 29 Jan 2023 07:25 )             33.7       Hemoglobin: 10.2 g/dL (01-29 @ 07:25)  Hemoglobin: 10.1 g/dL (01-28 @ 07:18)  Hemoglobin: 10.9 g/dL (01-27 @ 07:14)  Hemoglobin: 10.4 g/dL (01-26 @ 14:13)  Hemoglobin: 10.1 g/dL (01-25 @ 07:17)      01-29    137  |  105  |  50<H>  ----------------------------<  229<H>  4.0   |  20<L>  |  3.23<H>    Ca    9.0      29 Jan 2023 07:25  Phos  4.5     01-29  Mg     2.0     01-29    TPro  6.4  /  Alb  2.7<L>  /  TBili  0.1<L>  /  DBili  x   /  AST  10  /  ALT  7<L>  /  AlkPhos  110  01-29    Creatinine Trend: 3.23<--, 3.60<--, 3.82<--, 3.79<--, 4.09<--, 3.80<--    COAGS:     CARDIAC MARKERS ( 26 Jan 2023 14:13 )  x     / x     / 26 U/L / x     / 1.0 ng/mL        T(C): 36.6 (01-29-23 @ 04:58), Max: 36.7 (01-28-23 @ 10:51)  HR: 66 (01-29-23 @ 04:58) (66 - 73)  BP: 129/75 (01-29-23 @ 04:58) (106/51 - 129/75)  RR: 18 (01-29-23 @ 04:58) (18 - 20)  SpO2: 96% (01-29-23 @ 04:58) (96% - 99%)  Wt(kg): --    I&O's Summary    28 Jan 2023 07:01  -  29 Jan 2023 07:00  --------------------------------------------------------  IN: 1130 mL / OUT: 1920 mL / NET: -790 mL      Gen: Appears fatigued but A&O x 3  HEENT:  (-)icterus (-)pallor  CV: N S1 S2 1/6 JAKY (+)2 Pulses B/l  Resp:  Clear to ausculatation B/L, normal effort  GI: (+) BS Soft, NT, ND  Lymph:  (-)Edema, (-)obvious lymphadenopathy  Skin: Warm to touch, Normal turgor  Psych: Appropriate mood and affect    TELEMETRY: 	SR, Atrial tachycardia 130s        < from: TTE with Doppler (w/Cont) (09.09.22 @ 09:49) >  Conclusions:  1. Mild mitral annular calcification. Mitral annular  dilatation with normal leaflet opening. Mild mitral  regurgitation.  2. Aortic valve not well visualized; appears calcified.  Minimal aortic regurgitation.  3. Endocardial visualization enhanced with intravenous  injection of Ultrasonic Enhancing Agent (Definity). Left  ventricle suboptimally visualized despite intravenous  ultrasonic enhancing agent; the apex is foreshortened on  apical images. Overall normal left ventricular systolic  function.  4. Increased E/e' suggestive of elevated left ventricular  filling pressure.  5. The right ventricle is not well visualized; grossly  normal right ventricular size and systolic function.  *** Compared with echocardiogram of 1/11/2022, increased  E/e' suggestive of increased LV filling pressure is noted  on today's study.  ------------------------------------------------------------------------  Confirmed on  9/9/2022 - 12:08:11 by Bridgett Edmond M.D.    < end of copied text >        ASSESSMENT/PLAN: 	Patient is a 80 year old F with a PMHx of HTN, HLD, T2DM, HFpEF (EF 65%), breast cancer s/p R partial mastectomy, rectal carcinoma s/p resection, CAD s/p CABG (11/2020) who presents for 1 day of progressively worsening abdominal pain associated with nausea and NBNB vomiting.  Found to have acute rissa, s/p ERCP and stent and Perc rissa drain placed.  EP called for 2 episodes of tachycardia causing RRT.  Pt received Adenosine 6mg x 1 each time which slowed HR down.     -continue eliquis for lifelong anticoagulation for stroke prevention  -continue metoprolol 50 q6h  -if AF/AFL recurs will start amiodarone  -poor candidate for ablation at this time

## 2023-01-29 NOTE — PROGRESS NOTE ADULT - PROBLEM SELECTOR PLAN 4
Patient presenting with mild DKA given BHB of 2 and serum glucose of 450  - resolved, or likely was due to starvation ketosis given AG closed, pH normal, HCO3 normal, glucose improving  - patient on oral agents: repaglinide and semaglutide  - upon last admission 8/2022, patient was well controlled on lantus 16U QHS and admelog 7U TID  - A1c stable at 9.6  - FS elevated persistently to 200s now that patient has been tolerating a diet    Plan:  > inc lantus to 15U QHS  > add premeal admelog to 5U  > moderate dose correctional sliding scale with meals while eating

## 2023-01-29 NOTE — PROGRESS NOTE ADULT - SUBJECTIVE AND OBJECTIVE BOX
PROGRESS NOTE:     Patient is a 82y old  Female who presents with a chief complaint of acute cholecystitis, DKA (29 Jan 2023 08:57)      SUBJECTIVE / OVERNIGHT EVENTS:    ADDITIONAL REVIEW OF SYSTEMS:    MEDICATIONS  (STANDING):  apixaban 2.5 milliGRAM(s) Oral two times a day  aspirin  chewable 81 milliGRAM(s) Oral daily  atorvastatin 40 milliGRAM(s) Oral at bedtime  caspofungin IVPB      caspofungin IVPB 50 milliGRAM(s) IV Intermittent every 24 hours  chlorhexidine 4% Liquid 1 Application(s) Topical <User Schedule>  dextrose 5%. 1000 milliLiter(s) (100 mL/Hr) IV Continuous <Continuous>  dextrose 5%. 1000 milliLiter(s) (50 mL/Hr) IV Continuous <Continuous>  dextrose 50% Injectable 25 Gram(s) IV Push once  dextrose 50% Injectable 12.5 Gram(s) IV Push once  dextrose 50% Injectable 25 Gram(s) IV Push once  gabapentin 200 milliGRAM(s) Oral three times a day  glucagon  Injectable 1 milliGRAM(s) IntraMuscular once  indomethacin Suppository 100 milliGRAM(s) Rectal once  insulin glargine Injectable (LANTUS) 15 Unit(s) SubCutaneous at bedtime  insulin lispro (ADMELOG) corrective regimen sliding scale   SubCutaneous at bedtime  insulin lispro (ADMELOG) corrective regimen sliding scale   SubCutaneous three times a day before meals  insulin lispro Injectable (ADMELOG) 5 Unit(s) SubCutaneous three times a day before meals  metoprolol tartrate 50 milliGRAM(s) Oral every 6 hours  piperacillin/tazobactam IVPB.. 3.375 Gram(s) IV Intermittent every 12 hours    MEDICATIONS  (PRN):  acetaminophen     Tablet .. 650 milliGRAM(s) Oral every 6 hours PRN Temp greater or equal to 38C (100.4F), Mild Pain (1 - 3), Moderate Pain (4 - 6)  albuterol/ipratropium for Nebulization 3 milliLiter(s) Nebulizer every 6 hours PRN Shortness of Breath and/or Wheezing  aluminum hydroxide/magnesium hydroxide/simethicone Suspension 30 milliLiter(s) Oral every 6 hours PRN Dyspepsia  dextrose Oral Gel 15 Gram(s) Oral once PRN Blood Glucose LESS THAN 70 milliGRAM(s)/deciliter  sodium chloride 0.9% lock flush 10 milliLiter(s) IV Push every 1 hour PRN Pre/post blood products, medications, blood draw, and to maintain line patency      CAPILLARY BLOOD GLUCOSE      POCT Blood Glucose.: 257 mg/dL (29 Jan 2023 07:45)  POCT Blood Glucose.: 247 mg/dL (28 Jan 2023 21:29)  POCT Blood Glucose.: 222 mg/dL (28 Jan 2023 17:16)  POCT Blood Glucose.: 219 mg/dL (28 Jan 2023 12:19)    I&O's Summary    28 Jan 2023 07:01  -  29 Jan 2023 07:00  --------------------------------------------------------  IN: 1130 mL / OUT: 1920 mL / NET: -790 mL        PHYSICAL EXAM:  Vital Signs Last 24 Hrs  T(C): 36.6 (29 Jan 2023 04:58), Max: 36.7 (28 Jan 2023 10:51)  T(F): 97.9 (29 Jan 2023 04:58), Max: 98.1 (28 Jan 2023 10:51)  HR: 66 (29 Jan 2023 04:58) (66 - 73)  BP: 129/75 (29 Jan 2023 04:58) (106/51 - 129/75)  BP(mean): --  RR: 18 (29 Jan 2023 04:58) (18 - 20)  SpO2: 96% (29 Jan 2023 04:58) (96% - 99%)    Parameters below as of 29 Jan 2023 04:58  Patient On (Oxygen Delivery Method): nasal cannula  O2 Flow (L/min): 1      CONSTITUTIONAL: NAD, well-developed  RESPIRATORY: Normal respiratory effort; lungs are clear to auscultation bilaterally  CARDIOVASCULAR: Regular rate and rhythm, normal S1 and S2, no murmur/rub/gallop; No lower extremity edema; Peripheral pulses are 2+ bilaterally  ABDOMEN: Nontender to palpation, normoactive bowel sounds, no rebound/guarding  MUSCULOSKELETAL: no clubbing or cyanosis of digits; no joint swelling or tenderness to palpation  PSYCH: A+O to person, place, and time; affect appropriate    LABS:                        10.2   13.03 )-----------( 315      ( 29 Jan 2023 07:25 )             33.7     01-29    137  |  105  |  50<H>  ----------------------------<  229<H>  4.0   |  20<L>  |  3.23<H>    Ca    9.0      29 Jan 2023 07:25  Phos  4.5     01-29  Mg     2.0     01-29    TPro  6.4  /  Alb  2.7<L>  /  TBili  0.1<L>  /  DBili  x   /  AST  10  /  ALT  7<L>  /  AlkPhos  110  01-29                RADIOLOGY & ADDITIONAL TESTS:  Results Reviewed:   Imaging Personally Reviewed:  Electrocardiogram Personally Reviewed:    COORDINATION OF CARE:  Care Discussed with Consultants/Other Providers [Y/N]:  Prior or Outpatient Records Reviewed [Y/N]:   PROGRESS NOTE:     Patient is a 82y old  Female who presents with a chief complaint of acute cholecystitis, DKA (29 Jan 2023 08:57)      SUBJECTIVE / OVERNIGHT EVENTS: No episodes of SVT overnight. This AM patient seen and examined at bedside. Patient complaining of SOB, bumex started. No other complaints at this time.     ADDITIONAL REVIEW OF SYSTEMS: Negative     MEDICATIONS  (STANDING):  apixaban 2.5 milliGRAM(s) Oral two times a day  aspirin  chewable 81 milliGRAM(s) Oral daily  atorvastatin 40 milliGRAM(s) Oral at bedtime  caspofungin IVPB      caspofungin IVPB 50 milliGRAM(s) IV Intermittent every 24 hours  chlorhexidine 4% Liquid 1 Application(s) Topical <User Schedule>  dextrose 5%. 1000 milliLiter(s) (100 mL/Hr) IV Continuous <Continuous>  dextrose 5%. 1000 milliLiter(s) (50 mL/Hr) IV Continuous <Continuous>  dextrose 50% Injectable 25 Gram(s) IV Push once  dextrose 50% Injectable 12.5 Gram(s) IV Push once  dextrose 50% Injectable 25 Gram(s) IV Push once  gabapentin 200 milliGRAM(s) Oral three times a day  glucagon  Injectable 1 milliGRAM(s) IntraMuscular once  indomethacin Suppository 100 milliGRAM(s) Rectal once  insulin glargine Injectable (LANTUS) 15 Unit(s) SubCutaneous at bedtime  insulin lispro (ADMELOG) corrective regimen sliding scale   SubCutaneous at bedtime  insulin lispro (ADMELOG) corrective regimen sliding scale   SubCutaneous three times a day before meals  insulin lispro Injectable (ADMELOG) 5 Unit(s) SubCutaneous three times a day before meals  metoprolol tartrate 50 milliGRAM(s) Oral every 6 hours  piperacillin/tazobactam IVPB.. 3.375 Gram(s) IV Intermittent every 12 hours    MEDICATIONS  (PRN):  acetaminophen     Tablet .. 650 milliGRAM(s) Oral every 6 hours PRN Temp greater or equal to 38C (100.4F), Mild Pain (1 - 3), Moderate Pain (4 - 6)  albuterol/ipratropium for Nebulization 3 milliLiter(s) Nebulizer every 6 hours PRN Shortness of Breath and/or Wheezing  aluminum hydroxide/magnesium hydroxide/simethicone Suspension 30 milliLiter(s) Oral every 6 hours PRN Dyspepsia  dextrose Oral Gel 15 Gram(s) Oral once PRN Blood Glucose LESS THAN 70 milliGRAM(s)/deciliter  sodium chloride 0.9% lock flush 10 milliLiter(s) IV Push every 1 hour PRN Pre/post blood products, medications, blood draw, and to maintain line patency      CAPILLARY BLOOD GLUCOSE      POCT Blood Glucose.: 257 mg/dL (29 Jan 2023 07:45)  POCT Blood Glucose.: 247 mg/dL (28 Jan 2023 21:29)  POCT Blood Glucose.: 222 mg/dL (28 Jan 2023 17:16)  POCT Blood Glucose.: 219 mg/dL (28 Jan 2023 12:19)    I&O's Summary    28 Jan 2023 07:01  -  29 Jan 2023 07:00  --------------------------------------------------------  IN: 1130 mL / OUT: 1920 mL / NET: -790 mL        PHYSICAL EXAM:  Vital Signs Last 24 Hrs  T(C): 36.6 (29 Jan 2023 04:58), Max: 36.7 (28 Jan 2023 10:51)  T(F): 97.9 (29 Jan 2023 04:58), Max: 98.1 (28 Jan 2023 10:51)  HR: 66 (29 Jan 2023 04:58) (66 - 73)  BP: 129/75 (29 Jan 2023 04:58) (106/51 - 129/75)  BP(mean): --  RR: 18 (29 Jan 2023 04:58) (18 - 20)  SpO2: 96% (29 Jan 2023 04:58) (96% - 99%)    Parameters below as of 29 Jan 2023 04:58  Patient On (Oxygen Delivery Method): nasal cannula  O2 Flow (L/min): 1      GENERAL: Older woman in NAD, lying in bed comfortably  ENT: Moist mucous membranes  NECK: Supple   CHEST/LUNG: End expiratory wheezing b/l  HEART: Regular rate and rhythm; No murmurs, rubs, or gallops  ABDOMEN: mild RUQ tenderness TTP, cholecystostomy tube with bilious drainage, tube insertion site c/d/i   EXTREMITIES: +bilateral UE lymphedema R > L, 1+ bilateral LE pitting edema, 2+ peripheral pulses bilaterally  NERVOUS SYSTEM:  Alert & Oriented X3, speech clear. No deficits   MSK: FROM all 4 extremities, full and equal strength      LABS:                        10.2   13.03 )-----------( 315      ( 29 Jan 2023 07:25 )             33.7     01-29    137  |  105  |  50<H>  ----------------------------<  229<H>  4.0   |  20<L>  |  3.23<H>    Ca    9.0      29 Jan 2023 07:25  Phos  4.5     01-29  Mg     2.0     01-29    TPro  6.4  /  Alb  2.7<L>  /  TBili  0.1<L>  /  DBili  x   /  AST  10  /  ALT  7<L>  /  AlkPhos  110  01-29                RADIOLOGY & ADDITIONAL TESTS:  Results Reviewed:   Imaging Personally Reviewed:  Electrocardiogram Personally Reviewed:    COORDINATION OF CARE:  Care Discussed with Consultants/Other Providers [Y/N]:  Prior or Outpatient Records Reviewed [Y/N]:

## 2023-01-29 NOTE — DISCHARGE NOTE PROVIDER - NSDCFUADDAPPT_GEN_ALL_CORE_FT
Please call the Interventional Radiology booking office at [phone] to make an appointment for 1 month after discharge for drain evaluation APPTS ARE READY TO BE MADE: [ x] YES    Best Family or Patient Contact (if needed):    Additional Information about above appointments (if needed):    1: Infectious Disease with Dr. Fonseca in 1 - 2 weeks  2: Gastroenterology with Dr. Joseph Blanchard  3: PCP in 1 - 2 weeks    Other comments or requests:    APPTS ARE READY TO BE MADE: [ x] YES    Best Family or Patient Contact (if needed):    Additional Information about above appointments (if needed):    1: Infectious Disease with Dr. Fonseca in 1 - 2 weeks  2: Gastroenterology with Dr. Joseph Blanchard  3: PCP in 1 - 2 weeks    Other comments or requests:   Patient is being discharged to rehab. Caregiver will arrange follow up.

## 2023-01-29 NOTE — PROGRESS NOTE ADULT - PROBLEM SELECTOR PLAN 5
Patient with HFpEF, follows with Dr. Hagan   - recent admission 8/2022 for ADHF and fluid overload  -  upon dc, 602 upon admission  - TTE 8/2022: EF 62%, normal LV/RV systolic function, elevated LV filling pressures, mild MR    Plan:  > strict home parameters given tenuous volume status and intermittent hypotension  > hold bumex given worsening TAO, will reassess tomorrow Patient with HFpEF, follows with Dr. Hagan   - recent admission 8/2022 for ADHF and fluid overload  -  upon dc, 602 upon admission  - TTE 8/2022: EF 62%, normal LV/RV systolic function, elevated LV filling pressures, mild MR    Plan:  > strict home parameters given tenuous volume status and intermittent hypotension  > start bumex

## 2023-01-30 LAB
ALBUMIN SERPL ELPH-MCNC: 3 G/DL — LOW (ref 3.3–5)
ALP SERPL-CCNC: 107 U/L — SIGNIFICANT CHANGE UP (ref 40–120)
ALT FLD-CCNC: 6 U/L — LOW (ref 10–45)
ANION GAP SERPL CALC-SCNC: 14 MMOL/L — SIGNIFICANT CHANGE UP (ref 5–17)
AST SERPL-CCNC: 11 U/L — SIGNIFICANT CHANGE UP (ref 10–40)
BILIRUB SERPL-MCNC: 0.2 MG/DL — SIGNIFICANT CHANGE UP (ref 0.2–1.2)
BUN SERPL-MCNC: 54 MG/DL — HIGH (ref 7–23)
CALCIUM SERPL-MCNC: 9.2 MG/DL — SIGNIFICANT CHANGE UP (ref 8.4–10.5)
CHLORIDE SERPL-SCNC: 103 MMOL/L — SIGNIFICANT CHANGE UP (ref 96–108)
CO2 SERPL-SCNC: 20 MMOL/L — LOW (ref 22–31)
CREAT SERPL-MCNC: 3.51 MG/DL — HIGH (ref 0.5–1.3)
CULTURE RESULTS: SIGNIFICANT CHANGE UP
EGFR: 12 ML/MIN/1.73M2 — LOW
GLUCOSE BLDC GLUCOMTR-MCNC: 175 MG/DL — HIGH (ref 70–99)
GLUCOSE BLDC GLUCOMTR-MCNC: 194 MG/DL — HIGH (ref 70–99)
GLUCOSE BLDC GLUCOMTR-MCNC: 213 MG/DL — HIGH (ref 70–99)
GLUCOSE BLDC GLUCOMTR-MCNC: 216 MG/DL — HIGH (ref 70–99)
GLUCOSE SERPL-MCNC: 230 MG/DL — HIGH (ref 70–99)
HCT VFR BLD CALC: 36.1 % — SIGNIFICANT CHANGE UP (ref 34.5–45)
HGB BLD-MCNC: 10.7 G/DL — LOW (ref 11.5–15.5)
MAGNESIUM SERPL-MCNC: 1.7 MG/DL — SIGNIFICANT CHANGE UP (ref 1.6–2.6)
MCHC RBC-ENTMCNC: 29 PG — SIGNIFICANT CHANGE UP (ref 27–34)
MCHC RBC-ENTMCNC: 29.6 GM/DL — LOW (ref 32–36)
MCV RBC AUTO: 97.8 FL — SIGNIFICANT CHANGE UP (ref 80–100)
NRBC # BLD: 0 /100 WBCS — SIGNIFICANT CHANGE UP (ref 0–0)
ORGANISM # SPEC MICROSCOPIC CNT: SIGNIFICANT CHANGE UP
ORGANISM # SPEC MICROSCOPIC CNT: SIGNIFICANT CHANGE UP
PHOSPHATE SERPL-MCNC: 4.7 MG/DL — HIGH (ref 2.5–4.5)
PLATELET # BLD AUTO: 392 K/UL — SIGNIFICANT CHANGE UP (ref 150–400)
POTASSIUM SERPL-MCNC: 3.8 MMOL/L — SIGNIFICANT CHANGE UP (ref 3.5–5.3)
POTASSIUM SERPL-SCNC: 3.8 MMOL/L — SIGNIFICANT CHANGE UP (ref 3.5–5.3)
PROT SERPL-MCNC: 6.9 G/DL — SIGNIFICANT CHANGE UP (ref 6–8.3)
RBC # BLD: 3.69 M/UL — LOW (ref 3.8–5.2)
RBC # FLD: 16.5 % — HIGH (ref 10.3–14.5)
SODIUM SERPL-SCNC: 137 MMOL/L — SIGNIFICANT CHANGE UP (ref 135–145)
SPECIMEN SOURCE: SIGNIFICANT CHANGE UP
WBC # BLD: 11.78 K/UL — HIGH (ref 3.8–10.5)
WBC # FLD AUTO: 11.78 K/UL — HIGH (ref 3.8–10.5)

## 2023-01-30 PROCEDURE — 93306 TTE W/DOPPLER COMPLETE: CPT | Mod: 26

## 2023-01-30 PROCEDURE — 99232 SBSQ HOSP IP/OBS MODERATE 35: CPT

## 2023-01-30 PROCEDURE — 99233 SBSQ HOSP IP/OBS HIGH 50: CPT | Mod: GC

## 2023-01-30 PROCEDURE — 99231 SBSQ HOSP IP/OBS SF/LOW 25: CPT | Mod: FS

## 2023-01-30 RX ORDER — BUMETANIDE 0.25 MG/ML
1 INJECTION INTRAMUSCULAR; INTRAVENOUS DAILY
Refills: 0 | Status: DISCONTINUED | OUTPATIENT
Start: 2023-01-30 | End: 2023-02-07

## 2023-01-30 RX ORDER — GABAPENTIN 400 MG/1
200 CAPSULE ORAL DAILY
Refills: 0 | Status: DISCONTINUED | OUTPATIENT
Start: 2023-01-30 | End: 2023-02-07

## 2023-01-30 RX ORDER — INSULIN LISPRO 100/ML
6 VIAL (ML) SUBCUTANEOUS
Refills: 0 | Status: DISCONTINUED | OUTPATIENT
Start: 2023-01-30 | End: 2023-02-01

## 2023-01-30 RX ORDER — MAGNESIUM SULFATE 500 MG/ML
2 VIAL (ML) INJECTION ONCE
Refills: 0 | Status: COMPLETED | OUTPATIENT
Start: 2023-01-30 | End: 2023-01-30

## 2023-01-30 RX ORDER — ACETAMINOPHEN 500 MG
650 TABLET ORAL EVERY 6 HOURS
Refills: 0 | Status: COMPLETED | OUTPATIENT
Start: 2023-01-30 | End: 2023-02-01

## 2023-01-30 RX ORDER — ACYCLOVIR SODIUM 500 MG
200 VIAL (EA) INTRAVENOUS EVERY 12 HOURS
Refills: 0 | Status: DISCONTINUED | OUTPATIENT
Start: 2023-01-30 | End: 2023-01-30

## 2023-01-30 RX ORDER — ACYCLOVIR SODIUM 500 MG
200 VIAL (EA) INTRAVENOUS EVERY 8 HOURS
Refills: 0 | Status: DISCONTINUED | OUTPATIENT
Start: 2023-01-30 | End: 2023-02-04

## 2023-01-30 RX ORDER — POTASSIUM CHLORIDE 20 MEQ
20 PACKET (EA) ORAL ONCE
Refills: 0 | Status: COMPLETED | OUTPATIENT
Start: 2023-01-30 | End: 2023-01-30

## 2023-01-30 RX ORDER — INSULIN GLARGINE 100 [IU]/ML
19 INJECTION, SOLUTION SUBCUTANEOUS AT BEDTIME
Refills: 0 | Status: DISCONTINUED | OUTPATIENT
Start: 2023-01-30 | End: 2023-02-01

## 2023-01-30 RX ORDER — POTASSIUM CHLORIDE 20 MEQ
10 PACKET (EA) ORAL
Refills: 0 | Status: DISCONTINUED | OUTPATIENT
Start: 2023-01-30 | End: 2023-01-30

## 2023-01-30 RX ORDER — DIPHENHYDRAMINE HYDROCHLORIDE AND LIDOCAINE HYDROCHLORIDE AND ALUMINUM HYDROXIDE AND MAGNESIUM HYDRO
10 KIT THREE TIMES A DAY
Refills: 0 | Status: DISCONTINUED | OUTPATIENT
Start: 2023-01-30 | End: 2023-01-31

## 2023-01-30 RX ORDER — METOPROLOL TARTRATE 50 MG
50 TABLET ORAL EVERY 6 HOURS
Refills: 0 | Status: DISCONTINUED | OUTPATIENT
Start: 2023-01-30 | End: 2023-01-31

## 2023-01-30 RX ADMIN — DIPHENHYDRAMINE HYDROCHLORIDE AND LIDOCAINE HYDROCHLORIDE AND ALUMINUM HYDROXIDE AND MAGNESIUM HYDRO 10 MILLILITER(S): KIT at 17:14

## 2023-01-30 RX ADMIN — Medication 2: at 12:30

## 2023-01-30 RX ADMIN — BUMETANIDE 1 MILLIGRAM(S): 0.25 INJECTION INTRAMUSCULAR; INTRAVENOUS at 15:28

## 2023-01-30 RX ADMIN — CHLORHEXIDINE GLUCONATE 1 APPLICATION(S): 213 SOLUTION TOPICAL at 07:52

## 2023-01-30 RX ADMIN — Medication 50 MILLIGRAM(S): at 17:15

## 2023-01-30 RX ADMIN — Medication 30 MILLILITER(S): at 12:37

## 2023-01-30 RX ADMIN — BUMETANIDE 1 MILLIGRAM(S): 0.25 INJECTION INTRAMUSCULAR; INTRAVENOUS at 05:01

## 2023-01-30 RX ADMIN — ATORVASTATIN CALCIUM 40 MILLIGRAM(S): 80 TABLET, FILM COATED ORAL at 22:28

## 2023-01-30 RX ADMIN — Medication 4: at 07:53

## 2023-01-30 RX ADMIN — APIXABAN 2.5 MILLIGRAM(S): 2.5 TABLET, FILM COATED ORAL at 05:01

## 2023-01-30 RX ADMIN — Medication 20 MILLIEQUIVALENT(S): at 15:28

## 2023-01-30 RX ADMIN — PIPERACILLIN AND TAZOBACTAM 25 GRAM(S): 4; .5 INJECTION, POWDER, LYOPHILIZED, FOR SOLUTION INTRAVENOUS at 05:02

## 2023-01-30 RX ADMIN — Medication 81 MILLIGRAM(S): at 12:31

## 2023-01-30 RX ADMIN — PIPERACILLIN AND TAZOBACTAM 25 GRAM(S): 4; .5 INJECTION, POWDER, LYOPHILIZED, FOR SOLUTION INTRAVENOUS at 17:14

## 2023-01-30 RX ADMIN — FLUCONAZOLE 200 MILLIGRAM(S): 150 TABLET ORAL at 17:13

## 2023-01-30 RX ADMIN — Medication 3 MILLILITER(S): at 07:52

## 2023-01-30 RX ADMIN — Medication 25 GRAM(S): at 10:09

## 2023-01-30 RX ADMIN — Medication 650 MILLIGRAM(S): at 18:58

## 2023-01-30 RX ADMIN — INSULIN GLARGINE 19 UNIT(S): 100 INJECTION, SOLUTION SUBCUTANEOUS at 22:28

## 2023-01-30 RX ADMIN — Medication 4: at 17:57

## 2023-01-30 RX ADMIN — Medication 6 UNIT(S): at 17:57

## 2023-01-30 RX ADMIN — Medication 200 MILLIGRAM(S): at 22:29

## 2023-01-30 RX ADMIN — DIPHENHYDRAMINE HYDROCHLORIDE AND LIDOCAINE HYDROCHLORIDE AND ALUMINUM HYDROXIDE AND MAGNESIUM HYDRO 10 MILLILITER(S): KIT at 22:28

## 2023-01-30 RX ADMIN — Medication 50 MILLIGRAM(S): at 09:58

## 2023-01-30 RX ADMIN — Medication 50 MILLIGRAM(S): at 04:44

## 2023-01-30 RX ADMIN — APIXABAN 2.5 MILLIGRAM(S): 2.5 TABLET, FILM COATED ORAL at 17:15

## 2023-01-30 RX ADMIN — Medication 650 MILLIGRAM(S): at 17:58

## 2023-01-30 RX ADMIN — Medication 5 UNIT(S): at 07:53

## 2023-01-30 RX ADMIN — GABAPENTIN 200 MILLIGRAM(S): 400 CAPSULE ORAL at 12:31

## 2023-01-30 NOTE — PROGRESS NOTE ADULT - ATTENDING COMMENTS
Acute cholecystitis s/p per rissa- plan as documented.     Oral lesions- started on empiric Acyclovir for possible HSV. Monitor.

## 2023-01-30 NOTE — PROGRESS NOTE ADULT - PROBLEM SELECTOR PLAN 3
Patient with episode of SVT to 140s, though remained hemodynamically stable 1/26 PM  - vagal manuevers did not break arrhythmia  - appears to be atrial tachycardia; tele strips reviewed, arrhythmia breaks with p wave  - required RRT --> adenosine 6 1/27, 1/26  - possibly reactive iso infection, also missed AM toprol dose  - may have been component of volume overload given pt was on gentle hydration    Plan:  > toprol 150 qd switched to lopressor 50 q6h with liberalized hold parameters to ensure adequate AV diana blockade  > if pt has return of sustained atach can try AV diana agents (lopressor or dilt) if HD stable. Will likely need amio if that fails  > continue to monitor on telemetry

## 2023-01-30 NOTE — PROGRESS NOTE ADULT - SUBJECTIVE AND OBJECTIVE BOX
Luciano Partida PGY1  pager [phone] or check resident tab for coverage    Patient is a 82y old  Female who presents with a chief complaint of acute cholecystitis, DKA (29 Jan 2023 15:31)    SUBJECTIVE / OVERNIGHT EVENTS:    NAEO.  Patient this morning is,    Brief Daily Plan:    MEDICATIONS  MEDICATIONS  (STANDING):  apixaban 2.5 milliGRAM(s) Oral two times a day  aspirin  chewable 81 milliGRAM(s) Oral daily  atorvastatin 40 milliGRAM(s) Oral at bedtime  buMETAnide 1 milliGRAM(s) Oral two times a day  chlorhexidine 4% Liquid 1 Application(s) Topical <User Schedule>  dextrose 5%. 1000 milliLiter(s) (100 mL/Hr) IV Continuous <Continuous>  dextrose 5%. 1000 milliLiter(s) (50 mL/Hr) IV Continuous <Continuous>  dextrose 50% Injectable 25 Gram(s) IV Push once  dextrose 50% Injectable 12.5 Gram(s) IV Push once  dextrose 50% Injectable 25 Gram(s) IV Push once  fluconAZOLE   Tablet 200 milliGRAM(s) Oral every 24 hours  gabapentin 200 milliGRAM(s) Oral daily  glucagon  Injectable 1 milliGRAM(s) IntraMuscular once  insulin glargine Injectable (LANTUS) 15 Unit(s) SubCutaneous at bedtime  insulin lispro (ADMELOG) corrective regimen sliding scale   SubCutaneous at bedtime  insulin lispro (ADMELOG) corrective regimen sliding scale   SubCutaneous three times a day before meals  insulin lispro Injectable (ADMELOG) 5 Unit(s) SubCutaneous three times a day before meals  metoprolol tartrate 50 milliGRAM(s) Oral every 6 hours  piperacillin/tazobactam IVPB.. 3.375 Gram(s) IV Intermittent every 12 hours    MEDICATIONS  (PRN):  acetaminophen     Tablet .. 650 milliGRAM(s) Oral every 6 hours PRN Temp greater or equal to 38C (100.4F), Mild Pain (1 - 3), Moderate Pain (4 - 6)  albuterol/ipratropium for Nebulization 3 milliLiter(s) Nebulizer every 6 hours PRN Shortness of Breath and/or Wheezing  aluminum hydroxide/magnesium hydroxide/simethicone Suspension 30 milliLiter(s) Oral every 6 hours PRN Dyspepsia  dextrose Oral Gel 15 Gram(s) Oral once PRN Blood Glucose LESS THAN 70 milliGRAM(s)/deciliter  sodium chloride 0.9% lock flush 10 milliLiter(s) IV Push every 1 hour PRN Pre/post blood products, medications, blood draw, and to maintain line patency      VITALS  Vital Signs Last 24 Hrs  T(C): 36.6 (30 Jan 2023 06:37), Max: 36.6 (29 Jan 2023 16:18)  T(F): 97.9 (30 Jan 2023 06:37), Max: 97.9 (30 Jan 2023 06:37)  HR: 68 (30 Jan 2023 06:37) (66 - 70)  BP: 109/72 (30 Jan 2023 06:37) (99/57 - 136/78)  BP(mean): --  RR: 18 (30 Jan 2023 06:37) (17 - 18)  SpO2: 100% (30 Jan 2023 06:37) (88% - 100%)    Parameters below as of 30 Jan 2023 06:37  Patient On (Oxygen Delivery Method): nasal cannula  O2 Flow (L/min): 1      PHYSICAL EXAM:  GENERAL: no distress  PSYCH: A&O x3  HEAD: Atraumatic, Normocephalic  NECK: Supple, No JVD  CHEST/LUNG: clear to auscultation bilaterally  HEART: regular rate and rhythm, no murmurs  ABDOMEN: nontender to palpation, no rebound tenderness/guarding  EXTREMITIES: no edema on bilateral LE  NEUROLOGY: no focal neurologic deficit  SKIN: No rashes or lesions    LABS:  All labs personally Reviewed.    RADIOLOGY & ADDITIONAL TESTS:  All imaging personally Reviewed.  Luciano Partida PGY1  pager [phone] or check resident tab for coverage    Patient is a 82y old  Female who presents with a chief complaint of acute cholecystitis, DKA (29 Jan 2023 15:31)    SUBJECTIVE / OVERNIGHT EVENTS:    NAEO.  Patient this morning notes significant oral, throat pain, unable to tolerate adequate PO, only water.  Mouth lesions appear vesicular, concern for HSV.     Brief Daily Plan:  ·	c/w zosyn/fluconazole  ·	start acyclovir  ·	decrease bumex 1 qD per nephrology  ·	appreciate all consulted services    MEDICATIONS  MEDICATIONS  (STANDING):  apixaban 2.5 milliGRAM(s) Oral two times a day  aspirin  chewable 81 milliGRAM(s) Oral daily  atorvastatin 40 milliGRAM(s) Oral at bedtime  buMETAnide 1 milliGRAM(s) Oral two times a day  chlorhexidine 4% Liquid 1 Application(s) Topical <User Schedule>  dextrose 5%. 1000 milliLiter(s) (100 mL/Hr) IV Continuous <Continuous>  dextrose 5%. 1000 milliLiter(s) (50 mL/Hr) IV Continuous <Continuous>  dextrose 50% Injectable 25 Gram(s) IV Push once  dextrose 50% Injectable 12.5 Gram(s) IV Push once  dextrose 50% Injectable 25 Gram(s) IV Push once  fluconAZOLE   Tablet 200 milliGRAM(s) Oral every 24 hours  gabapentin 200 milliGRAM(s) Oral daily  glucagon  Injectable 1 milliGRAM(s) IntraMuscular once  insulin glargine Injectable (LANTUS) 15 Unit(s) SubCutaneous at bedtime  insulin lispro (ADMELOG) corrective regimen sliding scale   SubCutaneous at bedtime  insulin lispro (ADMELOG) corrective regimen sliding scale   SubCutaneous three times a day before meals  insulin lispro Injectable (ADMELOG) 5 Unit(s) SubCutaneous three times a day before meals  metoprolol tartrate 50 milliGRAM(s) Oral every 6 hours  piperacillin/tazobactam IVPB.. 3.375 Gram(s) IV Intermittent every 12 hours    MEDICATIONS  (PRN):  acetaminophen     Tablet .. 650 milliGRAM(s) Oral every 6 hours PRN Temp greater or equal to 38C (100.4F), Mild Pain (1 - 3), Moderate Pain (4 - 6)  albuterol/ipratropium for Nebulization 3 milliLiter(s) Nebulizer every 6 hours PRN Shortness of Breath and/or Wheezing  aluminum hydroxide/magnesium hydroxide/simethicone Suspension 30 milliLiter(s) Oral every 6 hours PRN Dyspepsia  dextrose Oral Gel 15 Gram(s) Oral once PRN Blood Glucose LESS THAN 70 milliGRAM(s)/deciliter  sodium chloride 0.9% lock flush 10 milliLiter(s) IV Push every 1 hour PRN Pre/post blood products, medications, blood draw, and to maintain line patency      VITALS  Vital Signs Last 24 Hrs  T(C): 36.6 (30 Jan 2023 06:37), Max: 36.6 (29 Jan 2023 16:18)  T(F): 97.9 (30 Jan 2023 06:37), Max: 97.9 (30 Jan 2023 06:37)  HR: 68 (30 Jan 2023 06:37) (66 - 70)  BP: 109/72 (30 Jan 2023 06:37) (99/57 - 136/78)  BP(mean): --  RR: 18 (30 Jan 2023 06:37) (17 - 18)  SpO2: 100% (30 Jan 2023 06:37) (88% - 100%)    Parameters below as of 30 Jan 2023 06:37  Patient On (Oxygen Delivery Method): nasal cannula  O2 Flow (L/min): 1      PHYSICAL EXAM:  GENERAL: no distress  PSYCH: A&O x3  HEAD: Atraumatic, Normocephalic  NECK: Supple, No JVD  CHEST/LUNG: clear to auscultation bilaterally  HEART: regular rate and rhythm, no murmurs  ABDOMEN: nontender to palpation, no rebound tenderness/guarding  EXTREMITIES: no edema on bilateral LE  NEUROLOGY: no focal neurologic deficit  SKIN: No rashes or lesions    LABS:  All labs personally Reviewed.    RADIOLOGY & ADDITIONAL TESTS:  All imaging personally Reviewed.

## 2023-01-30 NOTE — PROGRESS NOTE ADULT - PROBLEM SELECTOR PLAN 1
Patient presenting with 1 week of abdominal pain, nausea, and vomiting  - Initial CT A/P: acute cholecystitis with 1.2 cm common bile duct dilatation  - per surgery, not a surgical candidate given comorbidities   - s/p ERCP 1/20 : choledocholithiasis w/ large stone w/ sphincterotomy and biliary stent placement.   - patient now with recurrent abdominal pain, and leukocytosis, repeat CT A/P and MRI non con concerning for multiloculated fluid collection around the neck of the gallbladder, possible perf  - s/p perc rissa  - abdominal fluid cx growing gram positive rods and candida albicans  - concerning for biliary candidiasis    Plan:  > continue with zosyn 7 days after source control (1/25 - )   > continue with caspofungin per ID recs  > pain/nausea control with tylenol and maalox  > Patient will need repeat ERCP in 2-3 months for stent removal Patient presenting with 1 week of abdominal pain, nausea, and vomiting  - Initial CT A/P: acute cholecystitis with 1.2 cm common bile duct dilatation  - per surgery, not a surgical candidate given comorbidities   - s/p ERCP 1/20 : choledocholithiasis w/ large stone w/ sphincterotomy and biliary stent placement.   - patient now with recurrent abdominal pain, and leukocytosis, repeat CT A/P and MRI non con concerning for multiloculated fluid collection around the neck of the gallbladder, possible perf  - s/p perc rissa  - abdominal fluid cx growing gram positive rods and candida albicans  - concerning for biliary candidiasis    Plan:  > continue with zosyn 7 days after source control (1/25 - )   > continue with Fluconazole per ID recs  > pain/nausea control with tylenol and maalox  > Patient will need repeat ERCP in 2-3 months for stent removal

## 2023-01-30 NOTE — PROGRESS NOTE ADULT - ASSESSMENT
82 year old female with HTN, HLD, DM2, HFpEF, breast cancer s/p R partial mastectomy, rectal carcinoma s/p resection, CAD/ CABG presenting with abd pain, nausea and vomiting, found to have acute cholecystitis with choledocholithiasis. Underwent ERCP with biliary stent placement with repeat CT A/P with new fluid collection near the GB neck s/p perc rissa on 1/25 - culture with yeast. Leukocytosis better.    Recommend:  #Acute cholecystitis with choledocholithiasis/ abscess  -Continue zosyn, patient tolerating  -Change caspo to fluconazole 200 mg po q 24 hours  -Trend WBC, may need re-imaging if continues to rise  -Monitor for fevers    #Oral lesions, vesicular lesions  -Check for HSV  -Start empiric acyclovir    Dk Fonseca MD  Available through [employer] Teams  If no response, or after 5pm/weekends, call [phone]    Plan discussed with primary team.

## 2023-01-30 NOTE — PROGRESS NOTE ADULT - SUBJECTIVE AND OBJECTIVE BOX
EP    Date of service 1/30  Uneventful overnight. Resting in bed. No arrhythmias noted.      acetaminophen     Tablet .. 650 milliGRAM(s) Oral every 6 hours PRN  albuterol/ipratropium for Nebulization 3 milliLiter(s) Nebulizer every 6 hours PRN  aluminum hydroxide/magnesium hydroxide/simethicone Suspension 30 milliLiter(s) Oral every 6 hours PRN  apixaban 2.5 milliGRAM(s) Oral two times a day  aspirin  chewable 81 milliGRAM(s) Oral daily  atorvastatin 40 milliGRAM(s) Oral at bedtime  buMETAnide 1 milliGRAM(s) Oral two times a day  chlorhexidine 4% Liquid 1 Application(s) Topical <User Schedule>  dextrose 5%. 1000 milliLiter(s) IV Continuous <Continuous>  dextrose 5%. 1000 milliLiter(s) IV Continuous <Continuous>  dextrose 50% Injectable 25 Gram(s) IV Push once  dextrose 50% Injectable 12.5 Gram(s) IV Push once  dextrose 50% Injectable 25 Gram(s) IV Push once  dextrose Oral Gel 15 Gram(s) Oral once PRN  fluconAZOLE   Tablet 200 milliGRAM(s) Oral every 24 hours  gabapentin 200 milliGRAM(s) Oral daily  glucagon  Injectable 1 milliGRAM(s) IntraMuscular once  insulin glargine Injectable (LANTUS) 15 Unit(s) SubCutaneous at bedtime  insulin lispro (ADMELOG) corrective regimen sliding scale   SubCutaneous three times a day before meals  insulin lispro (ADMELOG) corrective regimen sliding scale   SubCutaneous at bedtime  insulin lispro Injectable (ADMELOG) 5 Unit(s) SubCutaneous three times a day before meals  metoprolol tartrate 50 milliGRAM(s) Oral every 6 hours  piperacillin/tazobactam IVPB.. 3.375 Gram(s) IV Intermittent every 12 hours  potassium chloride  10 mEq/100 mL IVPB 10 milliEquivalent(s) IV Intermittent every 1 hour  sodium chloride 0.9% lock flush 10 milliLiter(s) IV Push every 1 hour PRN                            10.7   11.78 )-----------( 392      ( 30 Jan 2023 07:41 )             36.1       01-30    137  |  103  |  54<H>  ----------------------------<  230<H>  3.8   |  20<L>  |  3.51<H>    Ca    9.2      30 Jan 2023 07:41  Phos  4.7     01-30  Mg     1.7     01-30    TPro  6.9  /  Alb  3.0<L>  /  TBili  0.2  /  DBili  x   /  AST  11  /  ALT  6<L>  /  AlkPhos  107  01-30    T(C): 36.7 (01-30-23 @ 08:53), Max: 36.7 (01-30-23 @ 08:53)  HR: 60 (01-30-23 @ 09:57) (60 - 70)  BP: 116/80 (01-30-23 @ 09:57) (99/57 - 136/78)  RR: 18 (01-30-23 @ 08:53) (17 - 18)  SpO2: 100% (01-30-23 @ 08:53) (88% - 100%)  Wt(kg): --    I&O's Summary    29 Jan 2023 07:01 - 30 Jan 2023 07:00  --------------------------------------------------------  IN: 1400 mL / OUT: 1800 mL / NET: -400 mL    30 Jan 2023 07:01  -  30 Jan 2023 11:12  --------------------------------------------------------  IN: 240 mL / OUT: 650 mL / NET: -410 mL          Gen: Appears fatigued but A&O x 3  HEENT:  (-)icterus (-)pallor  CV: N S1 S2 1/6 JAKY (+)2 Pulses B/l  Resp:  Clear to ausculatation B/L, normal effort  GI: (+) BS Soft, NT, ND  Lymph:  (-)Edema, (-)obvious lymphadenopathy  Skin: Warm to touch, Normal turgor  Psych: Appropriate mood and affect    TELEMETRY: 	SR      < from: TTE with Doppler (w/Cont) (09.09.22 @ 09:49) >  Conclusions:  1. Mild mitral annular calcification. Mitral annular  dilatation with normal leaflet opening. Mild mitral  regurgitation.  2. Aortic valve not well visualized; appears calcified.  Minimal aortic regurgitation.  3. Endocardial visualization enhanced with intravenous  injection of Ultrasonic Enhancing Agent (Definity). Left  ventricle suboptimally visualized despite intravenous  ultrasonic enhancing agent; the apex is foreshortened on  apical images. Overall normal left ventricular systolic  function.  4. Increased E/e' suggestive of elevated left ventricular  filling pressure.  5. The right ventricle is not well visualized; grossly  normal right ventricular size and systolic function.  *** Compared with echocardiogram of 1/11/2022, increased  E/e' suggestive of increased LV filling pressure is noted  on today's study.  ------------------------------------------------------------------------  Confirmed on  9/9/2022 - 12:08:11 by Bridgett Edmond M.D.    < end of copied text >      ASSESSMENT/PLAN: 	Patient is a 80 year old F with a PMHx of HTN, HLD, T2DM, HFpEF (EF 65%), breast cancer s/p R partial mastectomy, rectal carcinoma s/p resection, CAD s/p CABG (11/2020) who presents for 1 day of progressively worsening abdominal pain associated with nausea and NBNB vomiting.  Found to have acute rissa, s/p ERCP and stent and Perc rissa drain placed.  EP called for 2 episodes of tachycardia causing RRT.  Pt received Adenosine 6mg x 1 each time which slowed HR down.     -continue eliquis for lifelong anticoagulation for stroke prevention  -continue metoprolol 50 q6h  -if AF/AFL recurs will start amiodarone  -poor candidate for ablation at this time    Ld Lowery M.D.  Cardiac Electrophysiology  [phone]

## 2023-01-30 NOTE — PROGRESS NOTE ADULT - PROBLEM SELECTOR PLAN 5
Patient with HFpEF, follows with Dr. Hagan   - recent admission 8/2022 for ADHF and fluid overload  -  upon dc, 602 upon admission  - TTE 8/2022: EF 62%, normal LV/RV systolic function, elevated LV filling pressures, mild MR    Plan:  > strict home parameters given tenuous volume status and intermittent hypotension  > start bumex

## 2023-01-30 NOTE — PROGRESS NOTE ADULT - SUBJECTIVE AND OBJECTIVE BOX
Interventional Radiology Follow-Up Note.    This is a 82y Female s/p percutaneous cholecystostomy tube placement on 1/25 in Interventional Radiology with Dr. Conway.     S: Patient seen and examined @ bedside. No complaints offered.       Medication:  apixaban: (01-30)  aspirin  chewable: (01-29)  buMETAnide: (01-30)  caspofungin IVPB: (01-28)  fluconAZOLE   Tablet: (01-29)  metoprolol tartrate: (01-30)  piperacillin/tazobactam IVPB..: (01-30)    Vitals:   T(F): 98.1, Max: 98.1 (08:53)  HR: 60  BP: 116/80  RR: 18  SpO2: 100%      Physical Exam:  General: Nontoxic, in NAD  Abdomen: soft, NTND, no peritoneal signs.  Drain Device: Drain intact attached to bag with bilious output. Dressing clean, dry, intact. Drain flushed with 5cc NS w/o difficulty, +fluid return noted in tubing, no pericatheter leakage noted.   650cc in 24hour,  bile color        LABS:  Na: 137 (01-30 @ 07:41), 137 (01-29 @ 07:25), 136 (01-28 @ 07:18)  K: 3.8 (01-30 @ 07:41), 4.0 (01-29 @ 07:25), 3.9 (01-28 @ 07:18)  Cl: 103 (01-30 @ 07:41), 105 (01-29 @ 07:25), 103 (01-28 @ 07:18)  CO2: 20 (01-30 @ 07:41), 20 (01-29 @ 07:25), 18 (01-28 @ 07:18)  BUN: 54 (01-30 @ 07:41), 50 (01-29 @ 07:25), 47 (01-28 @ 07:18)  Cr: 3.51 (01-30 @ 07:41), 3.23 (01-29 @ 07:25), 3.60 (01-28 @ 07:18)  Glu: 230(01-30 @ 07:41), 229(01-29 @ 07:25), 222(01-28 @ 07:18)    Hgb: 10.7 (01-30 @ 07:41), 10.2 (01-29 @ 07:25), 10.1 (01-28 @ 07:18)  Hct: 36.1 (01-30 @ 07:41), 33.7 (01-29 @ 07:25), 33.7 (01-28 @ 07:18)  WBC: 11.78 (01-30 @ 07:41), 13.03 (01-29 @ 07:25), 12.26 (01-28 @ 07:18)  Plt: 392 (01-30 @ 07:41), 315 (01-29 @ 07:25), 293 (01-28 @ 07:18)        LIVER FUNCTIONS - ( 30 Jan 2023 07:41 )  Alb: 3.0 g/dL / Pro: 6.9 g/dL / ALK PHOS: 107 U/L / ALT: 6 U/L / AST: 11 U/L / GGT: x                   Culture - Body Fluid with Gram Stain (collected 01-25-23 @ 15:34)  Source: Abdominal Fl Abdominal Fluid  Gram Stain (01-25-23 @ 23:26):    No polymorphonuclear cells seen per low power field    Few Gram Positive Rods per oil power field  Preliminary Report (01-27-23 @ 19:20):    Rare Candida albicans  Organism: Candida albicans (01-29-23 @ 13:27)  Organism: Candida albicans (01-29-23 @ 13:27)      -  Fluconazole: S 0.5 Fluconazole: Susceptibility depends on achieving the maximum possible blood level. Doses higher than the standard dosing amount (6mg/kg/day) may be needed in adults with normal renal function and body habitus.  This test was developed and itsperformance characteristics determined by [facility], [place], [place]  It has not been cleared or approved by the U.[place]. Food and Drug Administration. S - Susceptible; SDD - Susceptible Dose Dependent; I - Intermediate; R - Resistant; N/I - No Interpretation Available      Method Type: YSTMIC        A/P:   82y Female with pmh of HTN, HLD, T2DM, HFpEF (EF 65% X/2022), breast cancer s/p R partial mastectomy, rectal carcinoma s/p resection, CAD s/p CABG who presents for 1 week of intermittent abdominal pain associated with nausea and vomiting, found to have acute cholecystitis with choledocholithiasis as well as mild DKA. Patient is now s/p ERCP with large CBD stones on fluoro and relatively small sized ampulla; +small periampullary diverticula; s/p sphincterotomy with sphictertome + plastic CBD stent placement with successful drainage of bile/sludge. Balloon extraction not done due to small sized ampulla relative to large CBD stones. GI recommendation to come back in 2-3 months for stone and stent removal. IR consulted for possible cholecystostomy tube placement initially deferred given patient improvement post ERCP. Patient began to develop abdominal pain and leukocytosis. MRI showing multiloculated collection at GB neck. Patient is now s/p percutaneous cholecystostomy tube placement on 1/25 in Interventional Radiology with Dr. Conway.          - abx per primary team      - Flush drain with 5cc NS daily forward only; DO NOT aspirate  - Change dressing q3 days or when dressing is saturated.  - Monitor h/h; transfuse as needed  - Continue global management per primary team  - Follow up with surgeon to determine surgical candidacy for gallbladder surgery. If instructed by surgeon follow up in 4-6 weeks for cholecystostomy drain evaluation. Otherwise, follow up every 3 months for routine exchange. Call to make appt at [phone]   - Pt will benefit from VNS service to help with drainage catheter care; Pt should continue same drainage catheter care as an outpatient.     Please call [place] at [phone] with any questions, concerns, or issues regarding above.

## 2023-01-30 NOTE — PROGRESS NOTE ADULT - SUBJECTIVE AND OBJECTIVE BOX
CC: Patient is a 82y old  Female who presents with a chief complaint of acute cholecystitis, DKA (30 Jan 2023 12:17)    ID following for acute cholecystitis    Interval History/ROS: Patient c/o oral pain, no fevers, no chills.    Rest of ROS negative.    Allergies  CT scan dye (Hives)  penicillin (Unknown)    ANTIMICROBIALS:  acyclovir   Oral Tab/Cap 200 every 12 hours  fluconAZOLE   Tablet 200 every 24 hours  piperacillin/tazobactam IVPB.. 3.375 every 12 hours    OTHER MEDS:  acetaminophen     Tablet .. 650 milliGRAM(s) Oral every 6 hours PRN  albuterol/ipratropium for Nebulization 3 milliLiter(s) Nebulizer every 6 hours PRN  aluminum hydroxide/magnesium hydroxide/simethicone Suspension 30 milliLiter(s) Oral every 6 hours PRN  apixaban 2.5 milliGRAM(s) Oral two times a day  aspirin  chewable 81 milliGRAM(s) Oral daily  atorvastatin 40 milliGRAM(s) Oral at bedtime  buMETAnide 1 milliGRAM(s) Oral daily  chlorhexidine 4% Liquid 1 Application(s) Topical <User Schedule>  dextrose 5%. 1000 milliLiter(s) IV Continuous <Continuous>  dextrose 5%. 1000 milliLiter(s) IV Continuous <Continuous>  dextrose 50% Injectable 25 Gram(s) IV Push once  dextrose 50% Injectable 12.5 Gram(s) IV Push once  dextrose 50% Injectable 25 Gram(s) IV Push once  dextrose Oral Gel 15 Gram(s) Oral once PRN  FIRST- Mouthwash  BLM 10 milliLiter(s) Swish and Spit three times a day  gabapentin 200 milliGRAM(s) Oral daily  glucagon  Injectable 1 milliGRAM(s) IntraMuscular once  insulin glargine Injectable (LANTUS) 19 Unit(s) SubCutaneous at bedtime  insulin lispro (ADMELOG) corrective regimen sliding scale   SubCutaneous three times a day before meals  insulin lispro (ADMELOG) corrective regimen sliding scale   SubCutaneous at bedtime  insulin lispro Injectable (ADMELOG) 6 Unit(s) SubCutaneous three times a day before meals  metoprolol tartrate 50 milliGRAM(s) Oral every 6 hours  potassium chloride   Powder 20 milliEquivalent(s) Oral once  sodium chloride 0.9% lock flush 10 milliLiter(s) IV Push every 1 hour PRN    PE:    Vital Signs Last 24 Hrs  T(C): 36.7 (30 Jan 2023 13:32), Max: 36.7 (30 Jan 2023 08:53)  T(F): 98.1 (30 Jan 2023 13:32), Max: 98.1 (30 Jan 2023 08:53)  HR: 73 (30 Jan 2023 14:21) (60 - 75)  BP: 106/65 (30 Jan 2023 14:21) (100/57 - 136/78)  BP(mean): 92 (30 Jan 2023 09:57) (92 - 92)  RR: 18 (30 Jan 2023 14:21) (17 - 18)  SpO2: 100% (30 Jan 2023 14:21) (88% - 100%)    Parameters below as of 30 Jan 2023 14:21  Patient On (Oxygen Delivery Method): nasal cannula  O2 Flow (L/min): 1    Gen: AOx3, NAD  HEENT: vesicular lesions on tongue  CV: S1+S2 normal, no murmurs  Resp: Clear bilat, no resp distress  Abd: Soft, nontender, +BS, biliary drain in place  Ext: No LE edema, no wounds  : No [place]  IV/Skin: No thrombophlebitis  Neuro: no focal deficits    LABS:                          10.7   11.78 )-----------( 392      ( 30 Jan 2023 07:41 )             36.1       01-30    137  |  103  |  54<H>  ----------------------------<  230<H>  3.8   |  20<L>  |  3.51<H>    Ca    9.2      30 Jan 2023 07:41  Phos  4.7     01-30  Mg     1.7     01-30    TPro  6.9  /  Alb  3.0<L>  /  TBili  0.2  /  DBili  x   /  AST  11  /  ALT  6<L>  /  AlkPhos  107  01-30    MICROBIOLOGY:  v  Abdominal Fl Abdominal Fluid  01-25-23   Rare Candida albicans  --  Candida albicans      .Blood Blood-Peripheral  01-22-23   No Growth Final  --  --      .Blood Blood-Peripheral  01-22-23   No Growth Final  --  --      .Blood Blood-Venous  01-20-23   No Growth Final  --  --    RADIOLOGY:    < from: Xray Chest 1 View- PORTABLE-Routine (Xray Chest 1 View- PORTABLE-Routine .) (01.28.23 @ 19:23) >  IMPRESSION:    Mild interval increase in pulmonary edema and small bilateral pleural   effusions since 1/21/2023.    < end of copied text >

## 2023-01-30 NOTE — PROGRESS NOTE ADULT - ASSESSMENT
ASSESSMENT:  Patient is a 80 year old F with a PMHx of HTN, HLD, T2DM, HFpEF (EF 65% X/2022), breast cancer s/p R partial mastectomy, rectal carcinoma s/p resection, CAD s/p CABG (11/2020) who presents for 1 day of progressively worsening abdominal pain associated with nausea and NBNB vomiting. 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  low nephron mass   non oliguric harleen likely in the view of relative  hypotension   hyponatremia-- improving   CVS----BP soft at times, stable at present    GI:  acute cholecystitis --- ERCP with stent placement  1/20/23  cholecystotomy tube placement  1/25/23 perc     RECOMMENDATION:  1)Renal: cr slight up   non oliguric harleen  avoid nephrotoxic medication  dose all medications for gfr ~10-15 ml/min    decrease bumex to 1 mg daily   daily bmp  2)CVS:  BP has been soft,   on metoprolol 50 mg q6h ( with holding parameters) , consider decreasing dose to q8h hr controlled   3) ID: on IV caspofungin and zosyn consider switching, abdominal fluid yeast growth seen ID on board.  4) GI on board      [facility]  Office: [phone]         ASSESSMENT:  Patient is a 80 year old F with a PMHx of HTN, HLD, T2DM, HFpEF (EF 65% X/2022), breast cancer s/p R partial mastectomy, rectal carcinoma s/p resection, CAD s/p CABG (11/2020) who presents for 1 day of progressively worsening abdominal pain associated with nausea and NBNB vomiting. 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  low nephron mass   non oliguric harleen likely in the view of relative  hypotension /hemodynamic --- cr still away up the base line   hyponatremia-- improving   CVS----BP soft at times, stable at present    GI:  acute cholecystitis --- ERCP with stent placement  1/20/23  cholecystotomy tube placement  1/25/23 perc     RECOMMENDATION:  1)Renal: cr slight up   non oliguric harleen  avoid nephrotoxic medication  dose all medications for gfr ~10-15 ml/min    on bumex 1 mg bid ,improve decrease bumex 1 mg daily , monitor resp status   daily bmp  2)CVS:  BP has been soft,   on metoprolol 50 mg q6h ( with holding parameters) , consider decreasing dose to q8h hr controlled ( if need further diuresis  will have better room )  3) ID: on IV caspofungin and zosyn consider switching, abdominal fluid yeast growth seen ID on board.  4) GI on board    d-w cardiology team  daughter at bed side updated   [facility]  Office: [phone]

## 2023-01-30 NOTE — PROGRESS NOTE ADULT - SUBJECTIVE AND OBJECTIVE BOX
NEPHROLOGY     Patient seen and examined resting comfortably no new complaints    MEDICATIONS  (STANDING):  apixaban 2.5 milliGRAM(s) Oral two times a day  aspirin  chewable 81 milliGRAM(s) Oral daily  atorvastatin 40 milliGRAM(s) Oral at bedtime  caspofungin IVPB      caspofungin IVPB 50 milliGRAM(s) IV Intermittent every 24 hours  chlorhexidine 4% Liquid 1 Application(s) Topical <User Schedule>  dextrose 5%. 1000 milliLiter(s) (100 mL/Hr) IV Continuous <Continuous>  dextrose 5%. 1000 milliLiter(s) (50 mL/Hr) IV Continuous <Continuous>  dextrose 50% Injectable 25 Gram(s) IV Push once  dextrose 50% Injectable 12.5 Gram(s) IV Push once  dextrose 50% Injectable 25 Gram(s) IV Push once  gabapentin 200 milliGRAM(s) Oral three times a day  glucagon  Injectable 1 milliGRAM(s) IntraMuscular once  heparin   Injectable 5000 Unit(s) SubCutaneous every 8 hours  indomethacin Suppository 100 milliGRAM(s) Rectal once  insulin glargine Injectable (LANTUS) 15 Unit(s) SubCutaneous at bedtime  insulin lispro (ADMELOG) corrective regimen sliding scale   SubCutaneous at bedtime  insulin lispro (ADMELOG) corrective regimen sliding scale   SubCutaneous three times a day before meals  insulin lispro Injectable (ADMELOG) 3 Unit(s) SubCutaneous three times a day before meals  metoprolol tartrate 50 milliGRAM(s) Oral every 6 hours  piperacillin/tazobactam IVPB.. 3.375 Gram(s) IV Intermittent every 12 hours    VITALS:  T(C): , Max: 37 (01-27-23 @ 13:47)  T(F): , Max: 98.6 (01-27-23 @ 13:47)  HR: 66 (01-28-23 @ 10:51)  BP: 106/69 (01-28-23 @ 10:51)  BP(mean): 87 (01-27-23 @ 15:45)  RR: 18 (01-28-23 @ 10:51)  SpO2: 98% (01-28-23 @ 10:51)    I and O's:    01-27 @ 07:01  -  01-28 @ 07:00  --------------------------------------------------------  IN: 1260 mL / OUT: 1620 mL / NET: -360 mL    01-28 @ 07:01  -  01-28 @ 12:44  --------------------------------------------------------  IN: 240 mL / OUT: 350 mL / NET: -110 mL    PHYSICAL EXAM:  General: NAD; Alert  HEENT:  NCAT; PERRLA  Neck: No JVD; supple  Respiratory: CTA-b/l  Cardiac: RRR s1s2  Gastrointestinal: BS+, soft, NT/ND , biliary drain in placed   Urologic: No chen  Extremities: No peripheral edema, lymph edema base line     LABS:                        10.1   12.26 )-----------( 293      ( 28 Jan 2023 07:18 )             33.7     01-28    136  |  103  |  47<H>  ----------------------------<  222<H>  3.9   |  18<L>  |  3.60<H>    Ca    8.7      28 Jan 2023 07:18  Phos  3.6     01-28  Mg     2.0     01-28    TPro  6.3  /  Alb  2.6<L>  /  TBili  0.2  /  DBili  x   /  AST  12  /  ALT  8<L>  /  AlkPhos  118  01-28

## 2023-01-30 NOTE — PROGRESS NOTE ADULT - SUBJECTIVE AND OBJECTIVE BOX
DATE OF SERVICE: 01-30-23 @ 15:15    Patient is a 82y old  Female who presents with a chief complaint of acute cholecystitis, DKA (30 Jan 2023 14:43)      INTERVAL HISTORY: Feels ok.     REVIEW OF SYSTEMS:  CONSTITUTIONAL: No weakness  EYES/ENT: No visual changes;  No throat pain   NECK: No pain or stiffness  RESPIRATORY: No cough, wheezing; No shortness of breath  CARDIOVASCULAR: No chest pain or palpitations  GASTROINTESTINAL: No abdominal  pain. No nausea, vomiting, or hematemesis  GENITOURINARY: No dysuria, frequency or hematuria  NEUROLOGICAL: No stroke like symptoms  SKIN: No rashes    TELEMETRY Personally reviewed:  	  MEDICATIONS:  buMETAnide 1 milliGRAM(s) Oral daily  metoprolol tartrate 50 milliGRAM(s) Oral every 6 hours        PHYSICAL EXAM:  T(C): 36.7 (01-30-23 @ 13:32), Max: 36.7 (01-30-23 @ 08:53)  HR: 73 (01-30-23 @ 14:21) (60 - 75)  BP: 106/65 (01-30-23 @ 14:21) (100/57 - 136/78)  RR: 18 (01-30-23 @ 14:21) (17 - 18)  SpO2: 100% (01-30-23 @ 14:21) (88% - 100%)  Wt(kg): --  I&O's Summary    29 Jan 2023 07:01  -  30 Jan 2023 07:00  --------------------------------------------------------  IN: 1400 mL / OUT: 1800 mL / NET: -400 mL    30 Jan 2023 07:01  -  30 Jan 2023 15:15  --------------------------------------------------------  IN: 240 mL / OUT: 650 mL / NET: -410 mL        Weight (kg): 82.3 (01-30 @ 14:07)    Appearance: In no distress	  HEENT:    PERRL, EOMI	  Cardiovascular:  S1 S2, No JVD  Respiratory: Lungs clear to auscultation	  Gastrointestinal:  Soft, Non-tender, + BS	  Vascularature:  + edema of LE  Psychiatric: Appropriate affect   Neuro: no acute focal deficits                               10.7   11.78 )-----------( 392      ( 30 Jan 2023 07:41 )             36.1     01-30    137  |  103  |  54<H>  ----------------------------<  230<H>  3.8   |  20<L>  |  3.51<H>    Ca    9.2      30 Jan 2023 07:41  Phos  4.7     01-30  Mg     1.7     01-30    TPro  6.9  /  Alb  3.0<L>  /  TBili  0.2  /  DBili  x   /  AST  11  /  ALT  6<L>  /  AlkPhos  107  01-30        Labs personally reviewed    < from: TTE with Doppler (w/Cont) (01.30.23 @ 15:39) >  Conclusions:  1. Normal left ventricular internal dimensions and wall  thicknesses.  2. Endocardial visualization enhanced with intravenous  injection of Ultrasonic Enhancing Agent (Definity).Normal  left ventricular systolic function. No segmental wall  motion abnormalities.  3. Increased E/e'  is consistent with elevated left  ventricular filling pressure.  4. Right ventricle not well visualized. Grossly borderline  right ventricular enlargement.  5. Mild-moderate tricuspid regurgitation.  6. Estimated pulmonary artery systolic pressure equals 65  mm Hg, assuming right atrial pressure equals 8 mm Hg,  consistent with severe pulmonary pressures.  *** Compared with echocardiogram of 9/9/2022, elevated PA  pressures noted on current study.    ASSESSMENT/PLAN: 	    80 year old woman with CAD s/p CABG in 2020, prior DVT on Eliquis, HTN, and compensated diastolic CHF presents with acute cholecystitis.       #Post-operative risk evaluation-  S/p ERCP with CBD stent. Tolerate procedure well.   Ms. Batista displays no signs or symptoms of acute coronary ischemia or decompensated CHF.  Admission EKG is sinus with pre-existing anteroseptal q-waves.  Recent echo noted- normal LV systolic function with no hemodynamically significant valvular disease.  s/p drain placement vai IR 1/25    #Compensated diastolic CHF-  Patient with severe pulm pressures on echo  Recommend increasing Bumex to 1mg PO BID    #CAD s/p CABG-  No signs of active ischemic.  c/w ASA and statin  - 1/26 with c/o epigastric burning a/b episodes of emesis. ECG performed, no ischemic changes noted.   -1/28 c/o chest pain and epigastric pain similar to 2 days ago. Repeat EKG with no ischemic changes. Trop wnl.  This is most likely noncardiac in nature given patient's recent cholecystitis     #Sepsis   - 2/2 acute cholecystitis   - S/p CBD stent  - s/p IR drain 1/25  - Afebrile, WBC downtrending    #SVT  - 1/26 patent HR in 130s while sleeping. Patient missed am dose of Metoprolol 150mg PO.  - Resume Metoprolol  - Adenosine 6mg IV x1 with termination of SVT  - cont to monitor on tele  - 1/27 with recurrent SVT s/p adenosine today   - EP consulted. Likely A-Tach. Recommend c/w Metoprolol 75mg PO BID and uptitrate as tolerated.         Delaney Vasquez, FABIOLA-NP   Sergey Winchester DO [facility]  Cardiovascular Medicine  [address] [place] [address], [address]  Available through call or text on [employer]  Office: [phone]   DATE OF SERVICE: 01-30-23 @ 15:15    Patient is a 82y old  Female who presents with a chief complaint of acute cholecystitis, DKA (30 Jan 2023 14:43)      INTERVAL HISTORY: Feels ok.     REVIEW OF SYSTEMS:  CONSTITUTIONAL: No weakness  EYES/ENT: No visual changes;  No throat pain   NECK: No pain or stiffness  RESPIRATORY: No cough, wheezing; No shortness of breath  CARDIOVASCULAR: No chest pain or palpitations  GASTROINTESTINAL: No abdominal  pain. No nausea, vomiting, or hematemesis  GENITOURINARY: No dysuria, frequency or hematuria  NEUROLOGICAL: No stroke like symptoms  SKIN: No rashes    TELEMETRY Personally reviewed:  	  MEDICATIONS:  buMETAnide 1 milliGRAM(s) Oral daily  metoprolol tartrate 50 milliGRAM(s) Oral every 6 hours        PHYSICAL EXAM:  T(C): 36.7 (01-30-23 @ 13:32), Max: 36.7 (01-30-23 @ 08:53)  HR: 73 (01-30-23 @ 14:21) (60 - 75)  BP: 106/65 (01-30-23 @ 14:21) (100/57 - 136/78)  RR: 18 (01-30-23 @ 14:21) (17 - 18)  SpO2: 100% (01-30-23 @ 14:21) (88% - 100%)  Wt(kg): --  I&O's Summary    29 Jan 2023 07:01  -  30 Jan 2023 07:00  --------------------------------------------------------  IN: 1400 mL / OUT: 1800 mL / NET: -400 mL    30 Jan 2023 07:01  -  30 Jan 2023 15:15  --------------------------------------------------------  IN: 240 mL / OUT: 650 mL / NET: -410 mL        Weight (kg): 82.3 (01-30 @ 14:07)    Appearance: In no distress	  HEENT:    PERRL, EOMI	  Cardiovascular:  S1 S2, No JVD  Respiratory: Lungs clear to auscultation	  Gastrointestinal:  Soft, Non-tender, + BS	  Vascularature:  + edema of LE  Psychiatric: Appropriate affect   Neuro: no acute focal deficits                               10.7   11.78 )-----------( 392      ( 30 Jan 2023 07:41 )             36.1     01-30    137  |  103  |  54<H>  ----------------------------<  230<H>  3.8   |  20<L>  |  3.51<H>    Ca    9.2      30 Jan 2023 07:41  Phos  4.7     01-30  Mg     1.7     01-30    TPro  6.9  /  Alb  3.0<L>  /  TBili  0.2  /  DBili  x   /  AST  11  /  ALT  6<L>  /  AlkPhos  107  01-30        Labs personally reviewed    < from: TTE with Doppler (w/Cont) (01.30.23 @ 15:39) >  Conclusions:  1. Normal left ventricular internal dimensions and wall  thicknesses.  2. Endocardial visualization enhanced with intravenous  injection of Ultrasonic Enhancing Agent (Definity).Normal  left ventricular systolic function. No segmental wall  motion abnormalities.  3. Increased E/e'  is consistent with elevated left  ventricular filling pressure.  4. Right ventricle not well visualized. Grossly borderline  right ventricular enlargement.  5. Mild-moderate tricuspid regurgitation.  6. Estimated pulmonary artery systolic pressure equals 65  mm Hg, assuming right atrial pressure equals 8 mm Hg,  consistent with severe pulmonary pressures.  *** Compared with echocardiogram of 9/9/2022, elevated PA  pressures noted on current study.    ASSESSMENT/PLAN: 	    80 year old woman with CAD s/p CABG in 2020, prior DVT on Eliquis, HTN, and compensated diastolic CHF presents with acute cholecystitis.       #Post-operative risk evaluation-  S/p ERCP with CBD stent. Tolerate procedure well.   Ms. Batista displays no signs or symptoms of acute coronary ischemia or decompensated CHF.  Admission EKG is sinus with pre-existing anteroseptal q-waves.  Recent echo noted- normal LV systolic function with no hemodynamically significant valvular disease.  s/p drain placement vai IR 1/25    #Decompensated diastolic CHF-  Patient with severe pulm pressures on echo  Recommend increasing Bumex to 1mg PO BID    #CAD s/p CABG-  No signs of active ischemic.  c/w ASA and statin  - 1/26 with c/o epigastric burning a/b episodes of emesis. ECG performed, no ischemic changes noted.   -1/28 c/o chest pain and epigastric pain similar to 2 days ago. Repeat EKG with no ischemic changes. Trop wnl.  This is most likely noncardiac in nature given patient's recent cholecystitis     #Sepsis   - 2/2 acute cholecystitis   - S/p CBD stent  - s/p IR drain 1/25  - Afebrile, WBC downtrending    #SVT  - 1/26 patent HR in 130s while sleeping. Patient missed am dose of Metoprolol 150mg PO.  - Resume Metoprolol  - Adenosine 6mg IV x1 with termination of SVT  - cont to monitor on tele  - 1/27 with recurrent SVT s/p adenosine today   - EP consulted. Likely A-Tach. Recommend c/w Metoprolol 75mg PO BID and uptitrate as tolerated.         FABIOLA Smith-SEE Winchester DO [facility]  Cardiovascular Medicine  [address] [place] [address], [address]  Available through call or text on [employer]  Office: [phone]

## 2023-01-31 LAB
ALBUMIN SERPL ELPH-MCNC: 2.9 G/DL — LOW (ref 3.3–5)
ALP SERPL-CCNC: 108 U/L — SIGNIFICANT CHANGE UP (ref 40–120)
ALT FLD-CCNC: 10 U/L — SIGNIFICANT CHANGE UP (ref 10–45)
ANION GAP SERPL CALC-SCNC: 12 MMOL/L — SIGNIFICANT CHANGE UP (ref 5–17)
AST SERPL-CCNC: 13 U/L — SIGNIFICANT CHANGE UP (ref 10–40)
BILIRUB SERPL-MCNC: 0.2 MG/DL — SIGNIFICANT CHANGE UP (ref 0.2–1.2)
BUN SERPL-MCNC: 50 MG/DL — HIGH (ref 7–23)
CALCIUM SERPL-MCNC: 9.1 MG/DL — SIGNIFICANT CHANGE UP (ref 8.4–10.5)
CHLORIDE SERPL-SCNC: 107 MMOL/L — SIGNIFICANT CHANGE UP (ref 96–108)
CO2 SERPL-SCNC: 18 MMOL/L — LOW (ref 22–31)
CREAT SERPL-MCNC: 3.45 MG/DL — HIGH (ref 0.5–1.3)
EGFR: 13 ML/MIN/1.73M2 — LOW
GLUCOSE BLDC GLUCOMTR-MCNC: 154 MG/DL — HIGH (ref 70–99)
GLUCOSE BLDC GLUCOMTR-MCNC: 173 MG/DL — HIGH (ref 70–99)
GLUCOSE BLDC GLUCOMTR-MCNC: 184 MG/DL — HIGH (ref 70–99)
GLUCOSE BLDC GLUCOMTR-MCNC: 214 MG/DL — HIGH (ref 70–99)
GLUCOSE SERPL-MCNC: 196 MG/DL — HIGH (ref 70–99)
HCT VFR BLD CALC: 34.3 % — LOW (ref 34.5–45)
HGB BLD-MCNC: 10.3 G/DL — LOW (ref 11.5–15.5)
MAGNESIUM SERPL-MCNC: 2.1 MG/DL — SIGNIFICANT CHANGE UP (ref 1.6–2.6)
MCHC RBC-ENTMCNC: 29.3 PG — SIGNIFICANT CHANGE UP (ref 27–34)
MCHC RBC-ENTMCNC: 30 GM/DL — LOW (ref 32–36)
MCV RBC AUTO: 97.4 FL — SIGNIFICANT CHANGE UP (ref 80–100)
NRBC # BLD: 0 /100 WBCS — SIGNIFICANT CHANGE UP (ref 0–0)
PHOSPHATE SERPL-MCNC: 4.6 MG/DL — HIGH (ref 2.5–4.5)
PLATELET # BLD AUTO: 418 K/UL — HIGH (ref 150–400)
POTASSIUM SERPL-MCNC: 3.9 MMOL/L — SIGNIFICANT CHANGE UP (ref 3.5–5.3)
POTASSIUM SERPL-SCNC: 3.9 MMOL/L — SIGNIFICANT CHANGE UP (ref 3.5–5.3)
PROT SERPL-MCNC: 6.6 G/DL — SIGNIFICANT CHANGE UP (ref 6–8.3)
RBC # BLD: 3.52 M/UL — LOW (ref 3.8–5.2)
RBC # FLD: 16.2 % — HIGH (ref 10.3–14.5)
SODIUM SERPL-SCNC: 137 MMOL/L — SIGNIFICANT CHANGE UP (ref 135–145)
WBC # BLD: 11.23 K/UL — HIGH (ref 3.8–10.5)
WBC # FLD AUTO: 11.23 K/UL — HIGH (ref 3.8–10.5)

## 2023-01-31 PROCEDURE — 99233 SBSQ HOSP IP/OBS HIGH 50: CPT | Mod: GC

## 2023-01-31 PROCEDURE — 99232 SBSQ HOSP IP/OBS MODERATE 35: CPT

## 2023-01-31 PROCEDURE — 74176 CT ABD & PELVIS W/O CONTRAST: CPT | Mod: 26

## 2023-01-31 RX ORDER — HYDROMORPHONE HYDROCHLORIDE 2 MG/ML
0.5 INJECTION INTRAMUSCULAR; INTRAVENOUS; SUBCUTANEOUS ONCE
Refills: 0 | Status: DISCONTINUED | OUTPATIENT
Start: 2023-01-31 | End: 2023-01-31

## 2023-01-31 RX ORDER — LIDOCAINE 4 G/100G
15 CREAM TOPICAL
Refills: 0 | Status: DISCONTINUED | OUTPATIENT
Start: 2023-01-31 | End: 2023-02-07

## 2023-01-31 RX ORDER — POTASSIUM CHLORIDE 20 MEQ
20 PACKET (EA) ORAL ONCE
Refills: 0 | Status: COMPLETED | OUTPATIENT
Start: 2023-01-31 | End: 2023-01-31

## 2023-01-31 RX ORDER — METOPROLOL TARTRATE 50 MG
100 TABLET ORAL EVERY 12 HOURS
Refills: 0 | Status: DISCONTINUED | OUTPATIENT
Start: 2023-01-31 | End: 2023-02-01

## 2023-01-31 RX ORDER — PIPERACILLIN AND TAZOBACTAM 4; .5 G/20ML; G/20ML
3.38 INJECTION, POWDER, LYOPHILIZED, FOR SOLUTION INTRAVENOUS EVERY 12 HOURS
Refills: 0 | Status: DISCONTINUED | OUTPATIENT
Start: 2023-02-01 | End: 2023-02-07

## 2023-01-31 RX ORDER — METOPROLOL TARTRATE 50 MG
100 TABLET ORAL
Refills: 0 | Status: DISCONTINUED | OUTPATIENT
Start: 2023-01-31 | End: 2023-01-31

## 2023-01-31 RX ADMIN — BUMETANIDE 1 MILLIGRAM(S): 0.25 INJECTION INTRAMUSCULAR; INTRAVENOUS at 06:11

## 2023-01-31 RX ADMIN — HYDROMORPHONE HYDROCHLORIDE 0.5 MILLIGRAM(S): 2 INJECTION INTRAMUSCULAR; INTRAVENOUS; SUBCUTANEOUS at 16:56

## 2023-01-31 RX ADMIN — Medication 650 MILLIGRAM(S): at 11:43

## 2023-01-31 RX ADMIN — Medication 650 MILLIGRAM(S): at 01:00

## 2023-01-31 RX ADMIN — Medication 650 MILLIGRAM(S): at 06:41

## 2023-01-31 RX ADMIN — LIDOCAINE 15 MILLILITER(S): 4 CREAM TOPICAL at 23:37

## 2023-01-31 RX ADMIN — Medication 50 MILLIGRAM(S): at 11:40

## 2023-01-31 RX ADMIN — Medication 6 UNIT(S): at 08:59

## 2023-01-31 RX ADMIN — LIDOCAINE 15 MILLILITER(S): 4 CREAM TOPICAL at 17:04

## 2023-01-31 RX ADMIN — HYDROMORPHONE HYDROCHLORIDE 0.5 MILLIGRAM(S): 2 INJECTION INTRAMUSCULAR; INTRAVENOUS; SUBCUTANEOUS at 17:56

## 2023-01-31 RX ADMIN — APIXABAN 2.5 MILLIGRAM(S): 2.5 TABLET, FILM COATED ORAL at 06:11

## 2023-01-31 RX ADMIN — Medication 650 MILLIGRAM(S): at 23:37

## 2023-01-31 RX ADMIN — Medication 20 MILLIEQUIVALENT(S): at 17:03

## 2023-01-31 RX ADMIN — Medication 650 MILLIGRAM(S): at 06:11

## 2023-01-31 RX ADMIN — Medication 6 UNIT(S): at 11:39

## 2023-01-31 RX ADMIN — PIPERACILLIN AND TAZOBACTAM 25 GRAM(S): 4; .5 INJECTION, POWDER, LYOPHILIZED, FOR SOLUTION INTRAVENOUS at 17:04

## 2023-01-31 RX ADMIN — ATORVASTATIN CALCIUM 40 MILLIGRAM(S): 80 TABLET, FILM COATED ORAL at 21:32

## 2023-01-31 RX ADMIN — PIPERACILLIN AND TAZOBACTAM 25 GRAM(S): 4; .5 INJECTION, POWDER, LYOPHILIZED, FOR SOLUTION INTRAVENOUS at 06:10

## 2023-01-31 RX ADMIN — Medication 3 MILLILITER(S): at 12:41

## 2023-01-31 RX ADMIN — Medication 6 UNIT(S): at 16:59

## 2023-01-31 RX ADMIN — Medication 30 MILLILITER(S): at 21:32

## 2023-01-31 RX ADMIN — Medication 100 MILLIGRAM(S): at 17:04

## 2023-01-31 RX ADMIN — GABAPENTIN 200 MILLIGRAM(S): 400 CAPSULE ORAL at 11:43

## 2023-01-31 RX ADMIN — Medication 2: at 11:39

## 2023-01-31 RX ADMIN — CHLORHEXIDINE GLUCONATE 1 APPLICATION(S): 213 SOLUTION TOPICAL at 08:59

## 2023-01-31 RX ADMIN — INSULIN GLARGINE 19 UNIT(S): 100 INJECTION, SOLUTION SUBCUTANEOUS at 21:32

## 2023-01-31 RX ADMIN — APIXABAN 2.5 MILLIGRAM(S): 2.5 TABLET, FILM COATED ORAL at 17:05

## 2023-01-31 RX ADMIN — Medication 2: at 17:05

## 2023-01-31 RX ADMIN — Medication 650 MILLIGRAM(S): at 17:56

## 2023-01-31 RX ADMIN — DIPHENHYDRAMINE HYDROCHLORIDE AND LIDOCAINE HYDROCHLORIDE AND ALUMINUM HYDROXIDE AND MAGNESIUM HYDRO 10 MILLILITER(S): KIT at 06:10

## 2023-01-31 RX ADMIN — Medication 81 MILLIGRAM(S): at 11:43

## 2023-01-31 RX ADMIN — Medication 200 MILLIGRAM(S): at 16:59

## 2023-01-31 RX ADMIN — FLUCONAZOLE 200 MILLIGRAM(S): 150 TABLET ORAL at 16:59

## 2023-01-31 RX ADMIN — Medication 200 MILLIGRAM(S): at 21:32

## 2023-01-31 RX ADMIN — Medication 650 MILLIGRAM(S): at 12:43

## 2023-01-31 RX ADMIN — Medication 50 MILLIGRAM(S): at 01:00

## 2023-01-31 RX ADMIN — Medication 4: at 08:59

## 2023-01-31 RX ADMIN — Medication 50 MILLIGRAM(S): at 06:11

## 2023-01-31 RX ADMIN — Medication 200 MILLIGRAM(S): at 06:11

## 2023-01-31 RX ADMIN — Medication 650 MILLIGRAM(S): at 17:04

## 2023-01-31 NOTE — PROGRESS NOTE ADULT - SUBJECTIVE AND OBJECTIVE BOX
DATE OF SERVICE: 01-31-23 @ 16:24    Patient is a 82y old  Female who presents with a chief complaint of acute cholecystitis, DKA (31 Jan 2023 13:49)      INTERVAL HISTORY: Feels ok.     REVIEW OF SYSTEMS:  CONSTITUTIONAL: No weakness  EYES/ENT: No visual changes;  No throat pain   NECK: No pain or stiffness  RESPIRATORY: No cough, wheezing; No shortness of breath  CARDIOVASCULAR: No chest pain or palpitations  GASTROINTESTINAL: No abdominal  pain. No nausea, vomiting, or hematemesis  GENITOURINARY: No dysuria, frequency or hematuria  NEUROLOGICAL: No stroke like symptoms  SKIN: No rashes    TELEMETRY Personally reviewed: SR 60-70  	  MEDICATIONS:  buMETAnide 1 milliGRAM(s) Oral daily  metoprolol tartrate 100 milliGRAM(s) Oral every 12 hours        PHYSICAL EXAM:  T(C): 36.4 (01-31-23 @ 13:06), Max: 36.6 (01-30-23 @ 17:16)  HR: 68 (01-31-23 @ 13:06) (65 - 83)  BP: 128/78 (01-31-23 @ 13:06) (107/68 - 128/78)  RR: 18 (01-31-23 @ 13:06) (18 - 18)  SpO2: 100% (01-31-23 @ 13:06) (100% - 100%)  Wt(kg): --  I&O's Summary    30 Jan 2023 07:01  -  31 Jan 2023 07:00  --------------------------------------------------------  IN: 820 mL / OUT: 2200 mL / NET: -1380 mL    31 Jan 2023 07:01  -  31 Jan 2023 16:24  --------------------------------------------------------  IN: 400 mL / OUT: 900 mL / NET: -500 mL          Appearance: In no distress	  HEENT:    PERRL, EOMI	  Cardiovascular:  S1 S2, No JVD  Respiratory: Lungs clear to auscultation	  Gastrointestinal:  Soft, Non-tender, + BS	  Vascularature:  + edema of LE  Psychiatric: Appropriate affect   Neuro: no acute focal deficits                               10.3   11.23 )-----------( 418      ( 31 Jan 2023 06:01 )             34.3     01-31    137  |  107  |  50<H>  ----------------------------<  196<H>  3.9   |  18<L>  |  3.45<H>    Ca    9.1      31 Jan 2023 06:01  Phos  4.6     01-31  Mg     2.1     01-31    TPro  6.6  /  Alb  2.9<L>  /  TBili  0.2  /  DBili  x   /  AST  13  /  ALT  10  /  AlkPhos  108  01-31        Labs personally reviewed      ASSESSMENT/PLAN: 	  80 year old woman with CAD s/p CABG in 2020, prior DVT on Eliquis, HTN, and compensated diastolic CHF presents with acute cholecystitis.       #Post-operative risk evaluation-  S/p ERCP with CBD stent. Tolerate procedure well.   Ms. Batista displays no signs or symptoms of acute coronary ischemia or decompensated CHF.  Admission EKG is sinus with pre-existing anteroseptal q-waves.  Recent echo noted- normal LV systolic function with no hemodynamically significant valvular disease.  s/p drain placement vai IR 1/25    #Decompensated diastolic CHF-  Patient with severe pulm pressures on echo  Recommend increasing Bumex to 1mg PO BID     #CAD s/p CABG-  No signs of active ischemic.  c/w ASA and statin  - 1/26 with c/o epigastric burning a/b episodes of emesis. ECG performed, no ischemic changes noted.   -1/28 c/o chest pain and epigastric pain similar to 2 days ago. Repeat EKG with no ischemic changes. Trop wnl.  This is most likely noncardiac in nature given patient's recent cholecystitis     #Sepsis   - 2/2 acute cholecystitis   - S/p CBD stent  - s/p IR drain 1/25  - Afebrile, WBC downtrending    #SVT  - 1/26 patent HR in 130s while sleeping. Patient missed am dose of Metoprolol 150mg PO.  - Resume Metoprolol  - Adenosine 6mg IV x1 with termination of SVT  - cont to monitor on tele  - 1/27 with recurrent SVT s/p adenosine today   - EP consulted. Likely A-Tach.   - HR has been stable. Metoprolol decreased to 100mg PO BID.           Delaney Vasquez, FABIOLA-NP   Sergey Winchester DO [facility]  Cardiovascular Medicine  [address] [place] [address], [address]  Available through call or text on [employer]  Office: [phone]   DATE OF SERVICE: 01-31-23 @ 16:24    Patient is a 82y old  Female who presents with a chief complaint of acute cholecystitis, DKA (31 Jan 2023 13:49)      INTERVAL HISTORY: Feels ok.     REVIEW OF SYSTEMS:  CONSTITUTIONAL: No weakness  EYES/ENT: No visual changes;  No throat pain   NECK: No pain or stiffness  RESPIRATORY: No cough, wheezing; No shortness of breath  CARDIOVASCULAR: No chest pain or palpitations  GASTROINTESTINAL: No abdominal  pain. No nausea, vomiting, or hematemesis  GENITOURINARY: No dysuria, frequency or hematuria  NEUROLOGICAL: No stroke like symptoms  SKIN: No rashes    TELEMETRY Personally reviewed: SR 60-70  	  MEDICATIONS:  buMETAnide 1 milliGRAM(s) Oral daily  metoprolol tartrate 100 milliGRAM(s) Oral every 12 hours        PHYSICAL EXAM:  T(C): 36.4 (01-31-23 @ 13:06), Max: 36.6 (01-30-23 @ 17:16)  HR: 68 (01-31-23 @ 13:06) (65 - 83)  BP: 128/78 (01-31-23 @ 13:06) (107/68 - 128/78)  RR: 18 (01-31-23 @ 13:06) (18 - 18)  SpO2: 100% (01-31-23 @ 13:06) (100% - 100%)  Wt(kg): --  I&O's Summary    30 Jan 2023 07:01  -  31 Jan 2023 07:00  --------------------------------------------------------  IN: 820 mL / OUT: 2200 mL / NET: -1380 mL    31 Jan 2023 07:01  -  31 Jan 2023 16:24  --------------------------------------------------------  IN: 400 mL / OUT: 900 mL / NET: -500 mL          Appearance: In no distress	  HEENT:    PERRL, EOMI	  Cardiovascular:  S1 S2, No JVD  Respiratory: Lungs clear to auscultation	  Gastrointestinal:  Soft, Non-tender, + BS	  Vascularature:  + edema of LE  Psychiatric: Appropriate affect   Neuro: no acute focal deficits                               10.3   11.23 )-----------( 418      ( 31 Jan 2023 06:01 )             34.3     01-31    137  |  107  |  50<H>  ----------------------------<  196<H>  3.9   |  18<L>  |  3.45<H>    Ca    9.1      31 Jan 2023 06:01  Phos  4.6     01-31  Mg     2.1     01-31    TPro  6.6  /  Alb  2.9<L>  /  TBili  0.2  /  DBili  x   /  AST  13  /  ALT  10  /  AlkPhos  108  01-31        Labs personally reviewed      ASSESSMENT/PLAN: 	  80 year old woman with CAD s/p CABG in 2020, prior DVT on Eliquis, HTN, and compensated diastolic CHF presents with acute cholecystitis.       #Post-operative risk evaluation-  S/p ERCP with CBD stent. Tolerate procedure well.   Ms. Batista displays no signs or symptoms of acute coronary ischemia or decompensated CHF.  Admission EKG is sinus with pre-existing anteroseptal q-waves.  Recent echo noted- normal LV systolic function with no hemodynamically significant valvular disease.  s/p drain placement vai IR 1/25    #Decompensated diastolic CHF-  Patient with severe pulm pressures on echo  Recommend increasing Bumex to 1mg PO BID     #CAD s/p CABG-  No signs of active ischemic.  c/w ASA and statin  - 1/26 with c/o epigastric burning a/b episodes of emesis. ECG performed, no ischemic changes noted.   -1/28 c/o chest pain and epigastric pain similar to 2 days ago. Repeat EKG with no ischemic changes. Trop wnl.  This is most likely noncardiac in nature given patient's recent cholecystitis     #Sepsis   - 2/2 acute cholecystitis   - S/p CBD stent  - s/p IR drain 1/25  - Afebrile, WBC downtrending    #SVT  - 1/26 patent HR in 130s while sleeping. Patient missed am dose of Metoprolol 150mg PO.  - Resume Metoprolol  - Adenosine 6mg IV x1 with termination of SVT  - cont to monitor on tele  - 1/27 with recurrent SVT s/p adenosine today   - EP consulted. Likely A-Tach.   - HR has been stable. Metoprolol decreased to 100mg PO BID.           Delaney Vasquez, FABIOLA-NP   Sergey Winchester DO [facility]  Cardiovascular Medicine  [address] [place] [address], [address]  Available through call or text on [employer]  Office: [phone]

## 2023-01-31 NOTE — PROGRESS NOTE ADULT - ATTENDING COMMENTS
Acute cholecystitis s/p per rissa- plan as documented.     Some increased in abdominal pain, had 2 formed BMs. Plan for repeat CT a/p today.

## 2023-01-31 NOTE — PROGRESS NOTE ADULT - SUBJECTIVE AND OBJECTIVE BOX
EP    Date of service 1/31  Uneventful overnight. Resting in bed. No arrhythmias noted.      acetaminophen     Tablet .. 650 milliGRAM(s) Oral every 6 hours  acyclovir   Oral Tab/Cap 200 milliGRAM(s) Oral every 8 hours  albuterol/ipratropium for Nebulization 3 milliLiter(s) Nebulizer every 6 hours PRN  aluminum hydroxide/magnesium hydroxide/simethicone Suspension 30 milliLiter(s) Oral every 6 hours PRN  apixaban 2.5 milliGRAM(s) Oral two times a day  aspirin  chewable 81 milliGRAM(s) Oral daily  atorvastatin 40 milliGRAM(s) Oral at bedtime  buMETAnide 1 milliGRAM(s) Oral daily  chlorhexidine 4% Liquid 1 Application(s) Topical <User Schedule>  dextrose 5%. 1000 milliLiter(s) IV Continuous <Continuous>  dextrose 5%. 1000 milliLiter(s) IV Continuous <Continuous>  dextrose 50% Injectable 25 Gram(s) IV Push once  dextrose 50% Injectable 12.5 Gram(s) IV Push once  dextrose 50% Injectable 25 Gram(s) IV Push once  dextrose Oral Gel 15 Gram(s) Oral once PRN  fluconAZOLE   Tablet 200 milliGRAM(s) Oral every 24 hours  gabapentin 200 milliGRAM(s) Oral daily  glucagon  Injectable 1 milliGRAM(s) IntraMuscular once  insulin glargine Injectable (LANTUS) 19 Unit(s) SubCutaneous at bedtime  insulin lispro (ADMELOG) corrective regimen sliding scale   SubCutaneous three times a day before meals  insulin lispro (ADMELOG) corrective regimen sliding scale   SubCutaneous at bedtime  insulin lispro Injectable (ADMELOG) 6 Unit(s) SubCutaneous three times a day before meals  lidocaine 2% Viscous 15 milliLiter(s) Oral four times a day  metoprolol tartrate 50 milliGRAM(s) Oral every 6 hours  piperacillin/tazobactam IVPB.. 3.375 Gram(s) IV Intermittent every 12 hours  sodium chloride 0.9% lock flush 10 milliLiter(s) IV Push every 1 hour PRN                            10.3   11.23 )-----------( 418      ( 31 Jan 2023 06:01 )             34.3       01-31    137  |  107  |  50<H>  ----------------------------<  196<H>  3.9   |  18<L>  |  3.45<H>    Ca    9.1      31 Jan 2023 06:01  Phos  4.6     01-31  Mg     2.1     01-31    TPro  6.6  /  Alb  2.9<L>  /  TBili  0.2  /  DBili  x   /  AST  13  /  ALT  10  /  AlkPhos  108  01-31    T(C): 36.2 (01-31-23 @ 09:42), Max: 36.7 (01-30-23 @ 13:32)  HR: 75 (01-31-23 @ 09:42) (65 - 83)  BP: 124/80 (01-31-23 @ 09:42) (106/65 - 124/80)  RR: 18 (01-31-23 @ 09:42) (18 - 18)  SpO2: 100% (01-31-23 @ 09:42) (100% - 100%)  Wt(kg): --    I&O's Summary    30 Jan 2023 07:01  -  31 Jan 2023 07:00  --------------------------------------------------------  IN: 820 mL / OUT: 2200 mL / NET: -1380 mL    31 Jan 2023 07:01  -  31 Jan 2023 13:00  --------------------------------------------------------  IN: 240 mL / OUT: 700 mL / NET: -460 mL    Gen: Appears fatigued but A&O x 3  HEENT:  (-)icterus (-)pallor  CV: N S1 S2 1/6 JAKY (+)2 Pulses B/l  Resp:  Clear to ausculatation B/L, normal effort  GI: (+) BS Soft, NT, ND  Lymph:  (-)Edema, (-)obvious lymphadenopathy  Skin: Warm to touch, Normal turgor  Psych: Appropriate mood and affect    TELEMETRY: 	SR      < from: TTE with Doppler (w/Cont) (09.09.22 @ 09:49) >  Conclusions:  1. Mild mitral annular calcification. Mitral annular  dilatation with normal leaflet opening. Mild mitral  regurgitation.  2. Aortic valve not well visualized; appears calcified.  Minimal aortic regurgitation.  3. Endocardial visualization enhanced with intravenous  injection of Ultrasonic Enhancing Agent (Definity). Left  ventricle suboptimally visualized despite intravenous  ultrasonic enhancing agent; the apex is foreshortened on  apical images. Overall normal left ventricular systolic  function.  4. Increased E/e' suggestive of elevated left ventricular  filling pressure.  5. The right ventricle is not well visualized; grossly  normal right ventricular size and systolic function.  *** Compared with echocardiogram of 1/11/2022, increased  E/e' suggestive of increased LV filling pressure is noted  on today's study.  ------------------------------------------------------------------------  Confirmed on  9/9/2022 - 12:08:11 by Bridgett Edmond M.D.    < end of copied text >      ASSESSMENT/PLAN: 	Patient is a 80 year old F with a PMHx of HTN, HLD, T2DM, HFpEF (EF 65%), breast cancer s/p R partial mastectomy, rectal carcinoma s/p resection, CAD s/p CABG (11/2020) who presents for 1 day of progressively worsening abdominal pain associated with nausea and NBNB vomiting.  Found to have acute rissa, s/p ERCP and stent and Perc rissa drain placed.  EP called for 2 episodes of tachycardia causing RRT.  Pt received Adenosine 6mg x 1 each time which slowed HR down.     -continue eliquis for lifelong anticoagulation for stroke prevention  -can change metoprolol to 100mg BID for ease of dosing.  thus far tolerating the total dose of 200mg/day well.  -if AF/AFL recurs will start amiodarone  -poor candidate for ablation at this time    Ld Lowery M.D.  Cardiac Electrophysiology  [phone]

## 2023-01-31 NOTE — PROGRESS NOTE ADULT - ASSESSMENT
Interventional Radiology Follow-Up Note    This is a 82y Female s/p percutaneous cholecystostomy tube placement on 1/25 in Interventional Radiology with Dr. Conway.     S: Patient seen and examined @ bedside. No complaints offered.     Medication:   acyclovir   Oral Tab/Cap: (01-31)  apixaban: (01-31)  aspirin  chewable: (01-30)  buMETAnide: (01-30)  buMETAnide: (01-31)  fluconAZOLE   Tablet: (01-30)  metoprolol tartrate: (01-30)  metoprolol tartrate: (01-31)  piperacillin/tazobactam IVPB..: (01-31)    Vitals:   T(F): 97.2, Max: 98.1 (13:32)  HR: 75  BP: 124/80  RR: 18  SpO2: 100%    Physical Exam:  General: Nontoxic, in NAD  Abdomen: soft, NTND, no peritoneal signs.  Drain Device: Drain intact attached to bag with bilious output. Dressing clean, dry, intact.     24hr Drain output: 500cc; Flushed with 5cc NS without resistance, leaking or pain at the site     LABS:  WBC 11.23 / Hgb 10.3 / Hct 34.3 / Plt 418  Na 137 / K 3.9 / CO2 18 / Cl 107 / BUN 50 / Cr 3.45 / Glucose 196  ALT 10 / AST 13 / Alk Phos 108 / Tbili 0.2  Ptt -- / Pt -- / INR --      Assessment/Plan:   82y Female with pmh of HTN, HLD, T2DM, HFpEF (EF 65% X/2022), breast cancer s/p R partial mastectomy, rectal carcinoma s/p resection, CAD s/p CABG who presents for 1 week of intermittent abdominal pain associated with nausea and vomiting, found to have acute cholecystitis with choledocholithiasis as well as mild DKA. Patient is now s/p ERCP with large CBD stones on fluoro and relatively small sized ampulla; +small periampullary diverticula; s/p sphincterotomy with sphictertome + plastic CBD stent placement with successful drainage of bile/sludge. Balloon extraction not done due to small sized ampulla relative to large CBD stones. GI recommendation to come back in 2-3 months for stone and stent removal. IR consulted for possible cholecystostomy tube placement initially deferred given patient improvement post ERCP. Patient began to develop abdominal pain and leukocytosis. MRI showing multiloculated collection at GB neck. Patient is now s/p percutaneous cholecystostomy tube placement on 1/25 in Interventional Radiology with Dr. Conway.     - abx per primary team      - Flush drain with 5cc NS daily forward only; DO NOT aspirate  - Change dressing q3 days or when dressing is saturated.  - Monitor h/h; transfuse as needed  - Continue global management per primary team  - Follow up with surgeon to determine surgical candidacy for gallbladder surgery. If instructed by surgeon follow up in 4-6 weeks for cholecystostomy drain evaluation. Otherwise, follow up every 3 months for routine exchange. Call to make appt at [phone]   - Pt will benefit from VNS service to help with drainage catheter care; Pt should continue same drainage catheter care as an outpatient.    Please call IR at extension [phone] with any questions, concerns, or issues regarding above.

## 2023-01-31 NOTE — DIETITIAN INITIAL EVALUATION ADULT - PHYSCIAL ASSESSMENT
Weight Hx Per:  - Source: patient's daughter   - UBW: 180 pounds   - Reported weight changes: Pt's daughter states weight fluctuates likely in the setting of fluid shifts.     Weight Hx Per [facility]:  - 192 pounds (9/10/2022)  - 178 pounds (3/11/2021)    Current Admission Weights:  - Dosing weight: 181.7 pounds (82.3 kg)    **  Will continue to monitor weight trends as available/able.   IBW: 105 pounds   %IBW: 172 %

## 2023-01-31 NOTE — DIETITIAN INITIAL EVALUATION ADULT - PERTINENT MEDS FT
MEDICATIONS  (STANDING):  acetaminophen     Tablet .. 650 milliGRAM(s) Oral every 6 hours  acyclovir   Oral Tab/Cap 200 milliGRAM(s) Oral every 8 hours  apixaban 2.5 milliGRAM(s) Oral two times a day  aspirin  chewable 81 milliGRAM(s) Oral daily  atorvastatin 40 milliGRAM(s) Oral at bedtime  buMETAnide 1 milliGRAM(s) Oral daily  chlorhexidine 4% Liquid 1 Application(s) Topical <User Schedule>  dextrose 5%. 1000 milliLiter(s) (100 mL/Hr) IV Continuous <Continuous>  dextrose 5%. 1000 milliLiter(s) (50 mL/Hr) IV Continuous <Continuous>  dextrose 50% Injectable 25 Gram(s) IV Push once  dextrose 50% Injectable 12.5 Gram(s) IV Push once  dextrose 50% Injectable 25 Gram(s) IV Push once  fluconAZOLE   Tablet 200 milliGRAM(s) Oral every 24 hours  gabapentin 200 milliGRAM(s) Oral daily  glucagon  Injectable 1 milliGRAM(s) IntraMuscular once  insulin glargine Injectable (LANTUS) 19 Unit(s) SubCutaneous at bedtime  insulin lispro (ADMELOG) corrective regimen sliding scale   SubCutaneous three times a day before meals  insulin lispro (ADMELOG) corrective regimen sliding scale   SubCutaneous at bedtime  insulin lispro Injectable (ADMELOG) 6 Unit(s) SubCutaneous three times a day before meals  lidocaine 2% Viscous 15 milliLiter(s) Oral four times a day  metoprolol tartrate 100 milliGRAM(s) Oral every 12 hours  piperacillin/tazobactam IVPB.. 3.375 Gram(s) IV Intermittent every 12 hours  potassium chloride   Powder 20 milliEquivalent(s) Oral once    MEDICATIONS  (PRN):  albuterol/ipratropium for Nebulization 3 milliLiter(s) Nebulizer every 6 hours PRN Shortness of Breath and/or Wheezing  aluminum hydroxide/magnesium hydroxide/simethicone Suspension 30 milliLiter(s) Oral every 6 hours PRN Dyspepsia  dextrose Oral Gel 15 Gram(s) Oral once PRN Blood Glucose LESS THAN 70 milliGRAM(s)/deciliter  sodium chloride 0.9% lock flush 10 milliLiter(s) IV Push every 1 hour PRN Pre/post blood products, medications, blood draw, and to maintain line patency

## 2023-01-31 NOTE — DIETITIAN INITIAL EVALUATION ADULT - REASON FOR ADMISSION
Per chart, "Patient is a 82 year old F with a PMHx of HTN, HLD, T2DM, HFpEF (EF 65% X/2022), breast cancer s/p R partial mastectomy, rectal carcinoma s/p resection, CAD s/p CABG who presents for 1 week of intermittent abdominal pain associated with nausea and vomiting, found to have acute cholecystitis with choledocholithiasis. Patient initially underwent ERCP with CBD placement but was found to have a new fluid collection adjacent to the gallbladder neck, now s/p percutaneous cholecystostomy, with fluid cx concerning for biliary candidiasis. Hospital course c/b recurrent SVT, likely A flutter. "

## 2023-01-31 NOTE — DIETITIAN INITIAL EVALUATION ADULT - REASON
Nutrition Focused Physical Exam deferred at this time, pt refused  services, unable to provided consent.

## 2023-01-31 NOTE — PROGRESS NOTE ADULT - PROBLEM SELECTOR PLAN 2
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   - Cr now worsening, 4.09  - s/p bumex 2mg IV 1/21 for dyspnea likely iso fluid overload due to HFpEF and holding of home diuretic  - non oliguric harleen, likely multifactorial etiology iso of dec po intake, intrinsic component 2/2 to hypotension and sepsis  - bladder scan PVRs normal, patient is not retaining  - renal US revealing chronic R hydronephrosis, stable from prior    Plan:  > Cr now stable  > likely prerenal ATN, patient now tolerating po appropriately  > will continue to monitor for now, if worsened can initiate gentle hydration - baseline 1.5 - 1.7  - single functioning kidney (right w/ known severe hydronephrosis)  - non-oliguric TAO multifactorial i/s/o relative hypotension  - likely ATN now    Plan:  - Cr now stable  - monitor on diuresis as below - baseline 1.5 - 1.7  - single functioning kidney (right w/ known severe hydronephrosis)  - non-oliguric TAO multifactorial largely i/s/o relative hypotension  - likely ATN now    Plan:  - Cr now stable in mid 3s  - monitor on diuresis as below  - dose adjust medications for Cr

## 2023-01-31 NOTE — PROGRESS NOTE ADULT - ASSESSMENT
82 year old female with HTN, HLD, DM2, HFpEF, breast cancer s/p R partial mastectomy, rectal carcinoma s/p resection, CAD/ CABG presenting with abd pain, nausea and vomiting, found to have acute cholecystitis with choledocholithiasis. Underwent ERCP with biliary stent placement with repeat CT A/P with new fluid collection near the GB neck s/p perc rissa on 1/25 - culture with yeast. Leukocytosis better.    Recommend:  #Acute cholecystitis with choledocholithiasis/ abscess  -Continue zosyn, patient tolerating  -Continue fluconazole 200 mg po q 24 hours  -Repeat CT Abd pelvis without contrast to evaluate gallbladder neck collection  -Monitor for fevers    #Oral lesions, vesicular lesions  -Continue acyclovir    Dk Fonseca MD  Available through MS Teams  If no response, or after 5pm/weekends, call [phone]    Plan discussed with primary team.

## 2023-01-31 NOTE — DIETITIAN INITIAL EVALUATION ADULT - PERTINENT LABORATORY DATA
01-31    137  |  107  |  50<H>  ----------------------------<  196<H>  3.9   |  18<L>  |  3.45<H>    Ca    9.1      31 Jan 2023 06:01  Phos  4.6     01-31  Mg     2.1     01-31    TPro  6.6  /  Alb  2.9<L>  /  TBili  0.2  /  DBili  x   /  AST  13  /  ALT  10  /  AlkPhos  108  01-31  POCT Blood Glucose.: 184 mg/dL (01-31-23 @ 11:29)  A1C with Estimated Average Glucose Result: 9.6 % (01-19-23 @ 06:51)  A1C with Estimated Average Glucose Result: 9.4 % (09-09-22 @ 07:27)  A1C with Estimated Average Glucose Result: 9.4 % (09-08-22 @ 12:44)

## 2023-01-31 NOTE — DIETITIAN INITIAL EVALUATION ADULT - ENTER FROM (G/KG)
History     Chief Complaint   Patient presents with     Hemoptysis     HPI  Sasha Hand is a 17 year old female who presents to the ED tonight with hemoptysis.  She has asthma and it has been acting up the past week or so.  She has been using her nebs and inhalers along with Mucinex, NyQuil and some codeine cough medicine that her mom had.  She does get some relief from the nabs but has coughing fits and now tonight coughed up some blood-tinged mucus.  She has had no fevers.  Has not been on steroids as of late but every year she seems to need a short burst when things act up like now.  She had some slight bloody noses earlier but those have resolved.    Problem List:    Patient Active Problem List    Diagnosis Date Noted     Nexplanon placed 11/14/17 11/14/2017     Priority: Medium     Genital herpes simplex, unspecified site 01/18/2017     Priority: Medium     Mild persistent asthma without complication 01/18/2017     Priority: Medium     Wheezing 05/04/2016     Priority: Medium     Tobacco use disorder 05/04/2016     Priority: Medium     Menorrhagia 01/20/2014     Priority: Medium     Dysmenorrhea 01/20/2014     Priority: Medium     Tear of lateral cartilage or meniscus of knee, current 09/09/2013     Priority: Medium     Obesity 09/14/2010     Priority: Medium        Past Medical History:    Past Medical History:   Diagnosis Date     Moderate major depression (H) 1/20/2014     Nexplanon placed 11/14/17 11/14/2017     Obesity 9/14/2010       Past Surgical History:    Past Surgical History:   Procedure Laterality Date     ARTHROSCOPY KNEE WITH MENISCAL REPAIR Right 5/10/2017    Procedure: ARTHROSCOPY KNEE WITH MENISCAL REPAIR;  arthroscopy right knee with lateral meniscus repair;  Surgeon: Nathaniel Hicks MD;  Location:  OR     NO HISTORY OF SURGERY         Family History:    Family History   Problem Relation Age of Onset     Asthma Mother      Hypertension Mother      Asthma Father      DIABETES Father       CANCER Paternal Grandmother        Social History:  Marital Status:  Single [1]  Social History   Substance Use Topics     Smoking status: Current Every Day Smoker     Packs/day: 0.50     Types: Cigarettes     Smokeless tobacco: Never Used     Alcohol use No        Medications:      predniSONE (DELTASONE) 20 MG tablet   albuterol (2.5 MG/3ML) 0.083% neb solution   etonogestrel (IMPLANON/NEXPLANON) 68 MG IMPL   HYDROcodone-acetaminophen (NORCO) 5-325 MG per tablet   valACYclovir (VALTREX) 500 MG tablet   montelukast (SINGULAIR) 10 MG tablet         Review of Systems   All other systems reviewed and are negative.      Physical Exam   BP: 130/74  Pulse: 96  Temp: 97.7  F (36.5  C)  Resp: 18  SpO2: 99 %      Physical Exam   Constitutional: She is oriented to person, place, and time. She appears well-developed and well-nourished. No distress.   HENT:   Mouth/Throat: Oropharynx is clear and moist.   Nose is clear   Eyes: EOM are normal.   Neck: Neck supple.   Cardiovascular: Normal rate, regular rhythm and normal heart sounds.    Pulmonary/Chest: Effort normal and breath sounds normal. No respiratory distress (1 hours after neb/mdi). She has no wheezes.   Musculoskeletal: Normal range of motion.   Neurological: She is alert and oriented to person, place, and time.   Skin: Skin is warm and dry.   Psychiatric: She has a normal mood and affect.       ED Course    She has had no fevers.  Her lungs sound clear.  Hemoptysis is most likely due to the coughing.  Will add prednisone burst.  Hold off on chest x-ray for now.  Likely viral etiology for the asthma exacerbation.         ED Course     Procedures            Assessments & Plan (with Medical Decision Making)    (J45.21) Exacerbation of intermittent asthma, unspecified asthma severity  Comment:   Plan: predniSONE (DELTASONE) 20 MG tablet  He has enough neb solution and her inhalers at home.  Hold off on antibiotics for now.            (R04.2) Hemoptysis  Comment: due  to coughing/bronchitis  Plan: Recheck in clinic if persistent problems.        I have reviewed the nursing notes.    I have reviewed the findings, diagnosis, plan and need for follow up with the patient.       New Prescriptions    PREDNISONE (DELTASONE) 20 MG TABLET    3 tabs today, then 2 tabs daily for 4 more days.       Final diagnoses:   Exacerbation of intermittent asthma, unspecified asthma severity   Hemoptysis - due to coughing/bronchitis       12/7/2017   [facility] EMERGENCY DEPARTMENT     Chris Ortiz MD  12/07/17 0135     1.1

## 2023-01-31 NOTE — PROGRESS NOTE ADULT - ASSESSMENT
ASSESSMENT:  Patient is a 80 year old F with a PMHx of HTN, HLD, T2DM, HFpEF (EF 65% X/2022), breast cancer s/p R partial mastectomy, rectal carcinoma s/p resection, CAD s/p CABG (11/2020) who presents for 1 day of progressively worsening abdominal pain associated with nausea and NBNB vomiting. 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  low nephron mass   non oliguric harleen likely in the view of relative  hypotension /hemodynamic --- cr still away up the base line   hyponatremia-- improving   CVS----BP soft at times, stable at present    GI:  acute cholecystitis --- ERCP with stent placement  1/20/23  cholecystotomy tube placement  1/25/23 perc     RECOMMENDATION:  1)Renal: cr slightly up, about same as yesterday  non oliguric harleen  avoid nephrotoxic medication  dose all medications for gfr ~10-15 ml/min   now on bumex 1 mg daily , monitor resp status   daily bmp  2)CVS:  BP has been soft, stable at present, now on metoprolol 100 mg po bid (would add holding parameters)  3) ID: s/p caspofungin and on IV zosyn consider switching, abdominal fluid yeast growth seen ID on board.  4) GI on board    MARY BautistaC  [facility]  [phone]

## 2023-01-31 NOTE — PROGRESS NOTE ADULT - PROBLEM SELECTOR PLAN 4
Patient presenting with mild DKA given BHB of 2 and serum glucose of 450  - resolved, or likely was due to starvation ketosis given AG closed, pH normal, HCO3 normal, glucose improving  - patient on oral agents: repaglinide and semaglutide  - upon last admission 8/2022, patient was well controlled on lantus 16U QHS and admelog 7U TID  - A1c stable at 9.6  - FS elevated persistently to 200s now that patient has been tolerating a diet    Plan:  > inc lantus to 15U QHS  > add premeal admelog to 5U  > moderate dose correctional sliding scale with meals while eating - patient with sustained episodes of SVT to 140s requiring adenosine  - unclear etiology  - appreciate EP recs  - stable, no further episodes on lopressor 50 q6h  - transition to lopressor 100 q12h

## 2023-01-31 NOTE — PROGRESS NOTE ADULT - ASSESSMENT
Patient is a 82 year old F with a PMHx of HTN, HLD, T2DM, HFpEF (EF 65% X/2022), breast cancer s/p R partial mastectomy, rectal carcinoma s/p resection, CAD s/p CABG who presents for 1 week of intermittent abdominal pain associated with nausea and vomiting, found to have acute cholecystitis with choledocholithiasis. Patient initially underwent ERCP with CBD placement but was found to have a new fluid collection adjacent to the gallbladder neck, now s/p percutaneous cholecystostomy, with fluid cx concerning for biliary candidiasis. Hospital course c/b recurrent SVT, likely A flutter.  82F history of HTN, HLD, DM2, HFpEF (EF 65%), CAD s/p CABG, Breast Ca s/p R partial mastectomy, Rectal Ca s/p resection presenting with abdominal pain x 1 week found to have acute cholecystitis, not a surgical candidate. S/p ERCP with stent placement course c/b gallbladder perforation s/p perc rissa w/ fluid c/f candida and GPCs on zosyn, fluconazole. 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

## 2023-01-31 NOTE — PROGRESS NOTE ADULT - PROBLEM SELECTOR PLAN 3
Patient with episode of SVT to 140s, though remained hemodynamically stable 1/26 PM  - vagal manuevers did not break arrhythmia  - appears to be atrial tachycardia; tele strips reviewed, arrhythmia breaks with p wave  - required RRT --> adenosine 6 1/27, 1/26  - possibly reactive iso infection, also missed AM toprol dose  - may have been component of volume overload given pt was on gentle hydration    Plan:  > toprol 150 qd switched to lopressor 50 q6h with liberalized hold parameters to ensure adequate AV diana blockade  > if pt has return of sustained atach can try AV diana agents (lopressor or dilt) if HD stable. Will likely need amio if that fails  > continue to monitor on telemetry - Patient with episode of SVT to 140s, though remained hemodynamically stable 1/26 PM  - vagal manuevers did not break arrhythmia  - appears to be atrial tachycardia; tele strips reviewed, arrhythmia breaks with p wave  - required RRT --> adenosine 6 1/27, 1/26  - possibly reactive iso infection, also missed AM toprol dose  - may have been component of volume overload given pt was on gentle hydration    Plan:  > toprol 150 qd switched to lopressor 50 q6h with liberalized hold parameters to ensure adequate AV diana blockade  > if pt has return of sustained atach can try AV diana agents (lopressor or dilt) if HD stable. Will likely need amio if that fails  > continue to monitor on telemetry - Hx of HFpEF  - recent admission 8/2022 for ADHF   - Echo w/ EF 60%, increased E/e' on 1/30/23  - c/w bumex 1 qD   - strict I&O, weights daily  - appreciate cardiology recs

## 2023-01-31 NOTE — DIETITIAN INITIAL EVALUATION ADULT - PERSON TAUGHT/METHOD
Emphasized the importance of adequate kcal and protein intake, provided recommendations to optimize nutritional intake in case of decreased appetite, recommended small frequent meals by consuming nutrient-dense snacks between meals, to start with protein, and sips of supplement throughout the day./verbal instruction/patient instructed/daughter instructed

## 2023-01-31 NOTE — DIETITIAN INITIAL EVALUATION ADULT - ADD RECOMMEND
1) New malnutrition notification sent.   2) Continue current diet order; consistent carbohydrate.   3) Recommend Glucerna 1x/day (220 amy, 10 Gm protein) to optimize PO intake.  4) Encourage adequate intake of meals and supplements to optimize PO intake.   5) Monitor PO intake/tolerance, weights, labs, hydration status, bowels, and skin integrity.

## 2023-01-31 NOTE — PROGRESS NOTE ADULT - PROBLEM SELECTOR PLAN 1
Patient presenting with 1 week of abdominal pain, nausea, and vomiting  - Initial CT A/P: acute cholecystitis with 1.2 cm common bile duct dilatation  - per surgery, not a surgical candidate given comorbidities   - s/p ERCP 1/20 : choledocholithiasis w/ large stone w/ sphincterotomy and biliary stent placement.   - patient now with recurrent abdominal pain, and leukocytosis, repeat CT A/P and MRI non con concerning for multiloculated fluid collection around the neck of the gallbladder, possible perf  - s/p perc rissa  - abdominal fluid cx growing gram positive rods and candida albicans  - concerning for biliary candidiasis    Plan:  > continue with zosyn 7 days after source control (1/25 - )   > continue with Fluconazole per ID recs  > pain/nausea control with tylenol and maalox  > Patient will need repeat ERCP in 2-3 months for stent removal - presented with 1 week abdominal pain, nausea, vomiting  - initial CT A/P c/w cholecystitis  - per surgery, not a candidate given comorbidities  - s/p ERCP 1/20 w/ stent  - repeat CT A/P for recurrent abdominal pain c/f perforated gallbladder  - s/p perc rissa  - abdominal fluid cx w/ GPCs and candida    Plan:  - c/w zosyn, fluconazole per ID  - plan for repeat ERCP w/ stent removal in 2 - 3 months  - f/u repeat CT A/P for recurrent abdominal pain 1/31/23 and persistent leukocytosis

## 2023-01-31 NOTE — DIETITIAN INITIAL EVALUATION ADULT - ENERGY INTAKE
Pt's daughter reports poor appetite and PO intake since admission, states pt has been consuming <50% of meals. Is amenable to receiving oral nutrition supplements. Pt's daughter confirms having no further questions at this time.     Intake:   - NPO (1/18-1/19)  - On clear liquid diet (1/19-1/21)  - On full liquid diet (1/21-1/22)  - On regular diet as of 1/25

## 2023-01-31 NOTE — DIETITIAN INITIAL EVALUATION ADULT - REASON INDICATOR FOR ASSESSMENT
Nutrition assessment warranted for: length of stay   Information obtained from: electronic medical record and patient's daughter; Shauna on the phone [phone].  Chart reviewed, events noted.

## 2023-01-31 NOTE — PROGRESS NOTE ADULT - PROBLEM SELECTOR PLAN 5
Patient with HFpEF, follows with Dr. Hagan   - recent admission 8/2022 for ADHF and fluid overload  -  upon dc, 602 upon admission  - TTE 8/2022: EF 62%, normal LV/RV systolic function, elevated LV filling pressures, mild MR    Plan:  > strict home parameters given tenuous volume status and intermittent hypotension  > start bumex - presented in mild DKA  - A1C 9.7  - c/w basal-bolus, target glucose 110 - 180

## 2023-01-31 NOTE — DIETITIAN INITIAL EVALUATION ADULT - ORAL INTAKE PTA/DIET HISTORY
Pt seen out of bed to chair, pt is Frisian speaking, refused translating services. Pt's daughter provided subjective information. Pt's daughter reports diminished appetite/PO intake for the past ~ 2 weeks. At baseline, pt consumes 2 meals a day, pt does the cooking at home.   - NKFA/intolerances reported.   - Pt tries to follow a consistent carbohydrate diet, avoids sugary drinks/foods.   - Micronutrient/Other supplementation: Vitamin B12  - Protein-energy supplementation: none   - No hx of chewing/swallowing difficulties.   Pt with hx of DM, pt's daughter confirms use of Januvia, unsure of finger stick BG range. HbA1c 9.6% (1/19/2023), suggests poor glycemic control.

## 2023-01-31 NOTE — PROGRESS NOTE ADULT - SUBJECTIVE AND OBJECTIVE BOX
CC: Patient is a 82y old  Female who presents with a chief complaint of acute cholecystitis, DKA (31 Jan 2023 11:37)    ID following for acute cholecystitis    Interval History/ROS: Patient has no new complaints. Oral pain better today. Able to eat today.    Rest of ROS negative.    Allergies  CT scan dye (Hives)  penicillin (Unknown)    ANTIMICROBIALS:  acyclovir   Oral Tab/Cap 200 every 8 hours  fluconAZOLE   Tablet 200 every 24 hours  piperacillin/tazobactam IVPB.. 3.375 every 12 hours    OTHER MEDS:  acetaminophen     Tablet .. 650 milliGRAM(s) Oral every 6 hours  albuterol/ipratropium for Nebulization 3 milliLiter(s) Nebulizer every 6 hours PRN  aluminum hydroxide/magnesium hydroxide/simethicone Suspension 30 milliLiter(s) Oral every 6 hours PRN  apixaban 2.5 milliGRAM(s) Oral two times a day  aspirin  chewable 81 milliGRAM(s) Oral daily  atorvastatin 40 milliGRAM(s) Oral at bedtime  buMETAnide 1 milliGRAM(s) Oral daily  chlorhexidine 4% Liquid 1 Application(s) Topical <User Schedule>  dextrose 5%. 1000 milliLiter(s) IV Continuous <Continuous>  dextrose 5%. 1000 milliLiter(s) IV Continuous <Continuous>  dextrose 50% Injectable 25 Gram(s) IV Push once  dextrose 50% Injectable 12.5 Gram(s) IV Push once  dextrose 50% Injectable 25 Gram(s) IV Push once  dextrose Oral Gel 15 Gram(s) Oral once PRN  gabapentin 200 milliGRAM(s) Oral daily  glucagon  Injectable 1 milliGRAM(s) IntraMuscular once  insulin glargine Injectable (LANTUS) 19 Unit(s) SubCutaneous at bedtime  insulin lispro (ADMELOG) corrective regimen sliding scale   SubCutaneous three times a day before meals  insulin lispro (ADMELOG) corrective regimen sliding scale   SubCutaneous at bedtime  insulin lispro Injectable (ADMELOG) 6 Unit(s) SubCutaneous three times a day before meals  lidocaine 2% Viscous 15 milliLiter(s) Oral four times a day  metoprolol tartrate 50 milliGRAM(s) Oral every 6 hours  sodium chloride 0.9% lock flush 10 milliLiter(s) IV Push every 1 hour PRN    PE:    Vital Signs Last 24 Hrs  T(C): 36.2 (31 Jan 2023 09:42), Max: 36.7 (30 Jan 2023 13:32)  T(F): 97.2 (31 Jan 2023 09:42), Max: 98.1 (30 Jan 2023 13:32)  HR: 75 (31 Jan 2023 09:42) (65 - 83)  BP: 124/80 (31 Jan 2023 09:42) (106/65 - 124/80)  BP(mean): --  RR: 18 (31 Jan 2023 09:42) (18 - 18)  SpO2: 100% (31 Jan 2023 09:42) (100% - 100%)    Parameters below as of 31 Jan 2023 09:42  Patient On (Oxygen Delivery Method): nasal cannula  O2 Flow (L/min): 1    Gen: AOx3, NAD  CV: S1+S2 normal, no murmurs  Resp: Clear bilat, no resp distress  Abd: Soft, nontender, +BS, biliary drain in place  Ext: No LE edema, no wounds  : No Dowling  IV/Skin: No thrombophlebitis  Neuro: no focal deficits    LABS:                          10.3   11.23 )-----------( 418      ( 31 Jan 2023 06:01 )             34.3       01-31    137  |  107  |  50<H>  ----------------------------<  196<H>  3.9   |  18<L>  |  3.45<H>    Ca    9.1      31 Jan 2023 06:01  Phos  4.6     01-31  Mg     2.1     01-31    TPro  6.6  /  Alb  2.9<L>  /  TBili  0.2  /  DBili  x   /  AST  13  /  ALT  10  /  AlkPhos  108  01-31          MICROBIOLOGY:  v  Abdominal Fl Abdominal Fluid  01-25-23   Rare Candida albicans  Please Note:************************************************  GPR seen in Gram stain is non-viable after prolonged  incubation and repeated subculture.  --  Candida albicans      .Blood Blood-Peripheral  01-22-23   No Growth Final  --  --      .Blood Blood-Peripheral  01-22-23   No Growth Final  --  --      .Blood Blood-Venous  01-20-23   No Growth Final  --  --    RADIOLOGY:    < from: Xray Chest 1 View- PORTABLE-Routine (Xray Chest 1 View- PORTABLE-Routine .) (01.28.23 @ 19:23) >  IMPRESSION:    Mild interval increase in pulmonary edema and small bilateral pleural   effusions since 1/21/2023.    < end of copied text >

## 2023-01-31 NOTE — PROGRESS NOTE ADULT - SUBJECTIVE AND OBJECTIVE BOX
Luciano Partida PGY1  pager [phone] or check resident tab for coverage    Patient is a 82y old  Female who presents with a chief complaint of acute cholecystitis, DKA (30 Jan 2023 15:15)    SUBJECTIVE / OVERNIGHT EVENTS:    NAEO.  Patient this morning is,    Brief Daily Plan:    MEDICATIONS  MEDICATIONS  (STANDING):  acetaminophen     Tablet .. 650 milliGRAM(s) Oral every 6 hours  acyclovir   Oral Tab/Cap 200 milliGRAM(s) Oral every 8 hours  apixaban 2.5 milliGRAM(s) Oral two times a day  aspirin  chewable 81 milliGRAM(s) Oral daily  atorvastatin 40 milliGRAM(s) Oral at bedtime  buMETAnide 1 milliGRAM(s) Oral daily  chlorhexidine 4% Liquid 1 Application(s) Topical <User Schedule>  dextrose 5%. 1000 milliLiter(s) (100 mL/Hr) IV Continuous <Continuous>  dextrose 5%. 1000 milliLiter(s) (50 mL/Hr) IV Continuous <Continuous>  dextrose 50% Injectable 25 Gram(s) IV Push once  dextrose 50% Injectable 12.5 Gram(s) IV Push once  dextrose 50% Injectable 25 Gram(s) IV Push once  FIRST- Mouthwash  BLM 10 milliLiter(s) Swish and Spit three times a day  fluconAZOLE   Tablet 200 milliGRAM(s) Oral every 24 hours  gabapentin 200 milliGRAM(s) Oral daily  glucagon  Injectable 1 milliGRAM(s) IntraMuscular once  insulin glargine Injectable (LANTUS) 19 Unit(s) SubCutaneous at bedtime  insulin lispro (ADMELOG) corrective regimen sliding scale   SubCutaneous at bedtime  insulin lispro (ADMELOG) corrective regimen sliding scale   SubCutaneous three times a day before meals  insulin lispro Injectable (ADMELOG) 6 Unit(s) SubCutaneous three times a day before meals  metoprolol tartrate 50 milliGRAM(s) Oral every 6 hours  piperacillin/tazobactam IVPB.. 3.375 Gram(s) IV Intermittent every 12 hours    MEDICATIONS  (PRN):  albuterol/ipratropium for Nebulization 3 milliLiter(s) Nebulizer every 6 hours PRN Shortness of Breath and/or Wheezing  aluminum hydroxide/magnesium hydroxide/simethicone Suspension 30 milliLiter(s) Oral every 6 hours PRN Dyspepsia  dextrose Oral Gel 15 Gram(s) Oral once PRN Blood Glucose LESS THAN 70 milliGRAM(s)/deciliter  sodium chloride 0.9% lock flush 10 milliLiter(s) IV Push every 1 hour PRN Pre/post blood products, medications, blood draw, and to maintain line patency      VITALS  Vital Signs Last 24 Hrs  T(C): 36.4 (31 Jan 2023 05:06), Max: 36.7 (30 Jan 2023 13:32)  T(F): 97.6 (31 Jan 2023 05:06), Max: 98.1 (30 Jan 2023 13:32)  HR: 65 (31 Jan 2023 05:06) (60 - 83)  BP: 117/65 (31 Jan 2023 05:06) (106/65 - 119/74)  BP(mean): 92 (30 Jan 2023 09:57) (92 - 92)  RR: 18 (31 Jan 2023 05:06) (18 - 18)  SpO2: 100% (31 Jan 2023 05:06) (100% - 100%)    Parameters below as of 31 Jan 2023 05:06  Patient On (Oxygen Delivery Method): nasal cannula  O2 Flow (L/min): 1      PHYSICAL EXAM:  GENERAL: no distress  PSYCH: A&O x3  HEAD: Atraumatic, Normocephalic  NECK: Supple, No JVD  CHEST/LUNG: clear to auscultation bilaterally  HEART: regular rate and rhythm, no murmurs  ABDOMEN: nontender to palpation, no rebound tenderness/guarding  EXTREMITIES: no edema on bilateral LE  NEUROLOGY: no focal neurologic deficit  SKIN: No rashes or lesions    LABS:  All labs personally Reviewed.    RADIOLOGY & ADDITIONAL TESTS:  All imaging personally Reviewed.  Luciano Partida PGY1  pager [phone] or check resident tab for coverage    Patient is a 82y old  Female who presents with a chief complaint of acute cholecystitis, DKA (30 Jan 2023 15:15)    SUBJECTIVE / OVERNIGHT EVENTS:    NAEO.  Patient this morning notes significant pain in oral cavity and throat when swallowing or speaking.    Update: In PM, patient with new right-sided abdominal pain that feels exactly like her pain on admission. Constant 10/10, nothing makes it better. Denies fever/chills, nausea, vomiting.     Brief Daily Plan:  ·	repeat CT abdomen/pelvis  ·	c/w zosyn, fluconazole, acyclovir  ·	viscous lidocaine, tylenol standing      MEDICATIONS  MEDICATIONS  (STANDING):  acetaminophen     Tablet .. 650 milliGRAM(s) Oral every 6 hours  acyclovir   Oral Tab/Cap 200 milliGRAM(s) Oral every 8 hours  apixaban 2.5 milliGRAM(s) Oral two times a day  aspirin  chewable 81 milliGRAM(s) Oral daily  atorvastatin 40 milliGRAM(s) Oral at bedtime  buMETAnide 1 milliGRAM(s) Oral daily  chlorhexidine 4% Liquid 1 Application(s) Topical <User Schedule>  dextrose 5%. 1000 milliLiter(s) (100 mL/Hr) IV Continuous <Continuous>  dextrose 5%. 1000 milliLiter(s) (50 mL/Hr) IV Continuous <Continuous>  dextrose 50% Injectable 25 Gram(s) IV Push once  dextrose 50% Injectable 12.5 Gram(s) IV Push once  dextrose 50% Injectable 25 Gram(s) IV Push once  FIRST- Mouthwash  BLM 10 milliLiter(s) Swish and Spit three times a day  fluconAZOLE   Tablet 200 milliGRAM(s) Oral every 24 hours  gabapentin 200 milliGRAM(s) Oral daily  glucagon  Injectable 1 milliGRAM(s) IntraMuscular once  insulin glargine Injectable (LANTUS) 19 Unit(s) SubCutaneous at bedtime  insulin lispro (ADMELOG) corrective regimen sliding scale   SubCutaneous at bedtime  insulin lispro (ADMELOG) corrective regimen sliding scale   SubCutaneous three times a day before meals  insulin lispro Injectable (ADMELOG) 6 Unit(s) SubCutaneous three times a day before meals  metoprolol tartrate 50 milliGRAM(s) Oral every 6 hours  piperacillin/tazobactam IVPB.. 3.375 Gram(s) IV Intermittent every 12 hours    MEDICATIONS  (PRN):  albuterol/ipratropium for Nebulization 3 milliLiter(s) Nebulizer every 6 hours PRN Shortness of Breath and/or Wheezing  aluminum hydroxide/magnesium hydroxide/simethicone Suspension 30 milliLiter(s) Oral every 6 hours PRN Dyspepsia  dextrose Oral Gel 15 Gram(s) Oral once PRN Blood Glucose LESS THAN 70 milliGRAM(s)/deciliter  sodium chloride 0.9% lock flush 10 milliLiter(s) IV Push every 1 hour PRN Pre/post blood products, medications, blood draw, and to maintain line patency      VITALS  Vital Signs Last 24 Hrs  T(C): 36.4 (31 Jan 2023 05:06), Max: 36.7 (30 Jan 2023 13:32)  T(F): 97.6 (31 Jan 2023 05:06), Max: 98.1 (30 Jan 2023 13:32)  HR: 65 (31 Jan 2023 05:06) (60 - 83)  BP: 117/65 (31 Jan 2023 05:06) (106/65 - 119/74)  BP(mean): 92 (30 Jan 2023 09:57) (92 - 92)  RR: 18 (31 Jan 2023 05:06) (18 - 18)  SpO2: 100% (31 Jan 2023 05:06) (100% - 100%)    Parameters below as of 31 Jan 2023 05:06  Patient On (Oxygen Delivery Method): nasal cannula  O2 Flow (L/min): 1      PHYSICAL EXAM:  GENERAL: no distress  PSYCH: A&O x3  HEAD: Atraumatic, Normocephalic  NECK: Supple, No JVD  CHEST/LUNG: clear to auscultation bilaterally  HEART: regular rate and rhythm, no murmurs  ABDOMEN: no rebound tenderness or guarding, tender to deep palpation in RUQ and RLQ  EXTREMITIES: no edema on bilateral LE  NEUROLOGY: no focal neurologic deficit  DRAIN: perc rissa drain with mostly serous fluid    LABS:  All labs personally Reviewed.    RADIOLOGY & ADDITIONAL TESTS:  All imaging personally Reviewed.

## 2023-01-31 NOTE — PROGRESS NOTE ADULT - PROBLEM SELECTOR PLAN 6
Continue home meds atorvastatin 40, asa 81 Diet: CC  DVT PPx: c/w home eliquis  Dispo: likely [facility] pending medical optimization

## 2023-01-31 NOTE — DIETITIAN INITIAL EVALUATION ADULT - OTHER INFO
Cardiology: Decompensated diastolic CHF, ordered for Bumex. On 1L NC.    Endo: BG being managed with Lispro and SSI. Finger sticks today 214, 184 mg/dl.     Renal: TAO on CKD. Phosphorus noted elevated (4.6) today.

## 2023-01-31 NOTE — PROGRESS NOTE ADULT - SUBJECTIVE AND OBJECTIVE BOX
NEPHROLOGY     Patient seen and examined resting comfortably, no new complaints, in no acute distress.     MEDICATIONS  (STANDING):  acetaminophen     Tablet .. 650 milliGRAM(s) Oral every 6 hours  acyclovir   Oral Tab/Cap 200 milliGRAM(s) Oral every 8 hours  apixaban 2.5 milliGRAM(s) Oral two times a day  aspirin  chewable 81 milliGRAM(s) Oral daily  atorvastatin 40 milliGRAM(s) Oral at bedtime  buMETAnide 1 milliGRAM(s) Oral daily  chlorhexidine 4% Liquid 1 Application(s) Topical <User Schedule>  dextrose 5%. 1000 milliLiter(s) (100 mL/Hr) IV Continuous <Continuous>  dextrose 5%. 1000 milliLiter(s) (50 mL/Hr) IV Continuous <Continuous>  dextrose 50% Injectable 25 Gram(s) IV Push once  dextrose 50% Injectable 12.5 Gram(s) IV Push once  dextrose 50% Injectable 25 Gram(s) IV Push once  fluconAZOLE   Tablet 200 milliGRAM(s) Oral every 24 hours  gabapentin 200 milliGRAM(s) Oral daily  glucagon  Injectable 1 milliGRAM(s) IntraMuscular once  insulin glargine Injectable (LANTUS) 19 Unit(s) SubCutaneous at bedtime  insulin lispro (ADMELOG) corrective regimen sliding scale   SubCutaneous three times a day before meals  insulin lispro (ADMELOG) corrective regimen sliding scale   SubCutaneous at bedtime  insulin lispro Injectable (ADMELOG) 6 Unit(s) SubCutaneous three times a day before meals  lidocaine 2% Viscous 15 milliLiter(s) Oral four times a day  metoprolol tartrate 100 milliGRAM(s) Oral every 12 hours  piperacillin/tazobactam IVPB.. 3.375 Gram(s) IV Intermittent every 12 hours  potassium chloride   Powder 20 milliEquivalent(s) Oral once    VITALS:  T(C): , Max: 36.6 (01-30-23 @ 17:16)  T(F): , Max: 97.9 (01-30-23 @ 17:16)  HR: 68 (01-31-23 @ 13:06)  BP: 128/78 (01-31-23 @ 13:06)  RR: 18 (01-31-23 @ 13:06)  SpO2: 100% (01-31-23 @ 13:06)    I and O's:    01-30 @ 07:01 - 01-31 @ 07:00  --------------------------------------------------------  IN: 820 mL / OUT: 2200 mL / NET: -1380 mL    01-31 @ 07:01 - 01-31 @ 13:49  --------------------------------------------------------  IN: 400 mL / OUT: 900 mL / NET: -500 mL    Weight (kg): 82.3 (01-30 @ 14:07)    PHYSICAL EXAM:  General: NAD; Alert  HEENT:  NCAT; PERRLA  Neck: No JVD; supple  Respiratory: CTA-b/l  Cardiac: RRR s1s2  Gastrointestinal: BS+, soft, NT/ND , biliary drain in placed   Urologic: No chen  Extremities: No peripheral edema, lymphedema at baseline     LABS:                        10.3   11.23 )-----------( 418      ( 31 Jan 2023 06:01 )             34.3     01-31    137  |  107  |  50<H>  ----------------------------<  196<H>  3.9   |  18<L>  |  3.45<H>    Ca    9.1      31 Jan 2023 06:01  Phos  4.6     01-31  Mg     2.1     01-31    TPro  6.6  /  Alb  2.9<L>  /  TBili  0.2  /  DBili  x   /  AST  13  /  ALT  10  /  AlkPhos  108  01-31

## 2023-02-01 LAB
ALBUMIN SERPL ELPH-MCNC: 2.7 G/DL — LOW (ref 3.3–5)
ALP SERPL-CCNC: 130 U/L — HIGH (ref 40–120)
ALT FLD-CCNC: 13 U/L — SIGNIFICANT CHANGE UP (ref 10–45)
ANION GAP SERPL CALC-SCNC: 15 MMOL/L — SIGNIFICANT CHANGE UP (ref 5–17)
AST SERPL-CCNC: 13 U/L — SIGNIFICANT CHANGE UP (ref 10–40)
BILIRUB SERPL-MCNC: 0.2 MG/DL — SIGNIFICANT CHANGE UP (ref 0.2–1.2)
BUN SERPL-MCNC: 50 MG/DL — HIGH (ref 7–23)
CALCIUM SERPL-MCNC: 9.2 MG/DL — SIGNIFICANT CHANGE UP (ref 8.4–10.5)
CHLORIDE SERPL-SCNC: 105 MMOL/L — SIGNIFICANT CHANGE UP (ref 96–108)
CO2 SERPL-SCNC: 16 MMOL/L — LOW (ref 22–31)
CREAT SERPL-MCNC: 3.41 MG/DL — HIGH (ref 0.5–1.3)
EGFR: 13 ML/MIN/1.73M2 — LOW
GLUCOSE BLDC GLUCOMTR-MCNC: 130 MG/DL — HIGH (ref 70–99)
GLUCOSE BLDC GLUCOMTR-MCNC: 135 MG/DL — HIGH (ref 70–99)
GLUCOSE BLDC GLUCOMTR-MCNC: 220 MG/DL — HIGH (ref 70–99)
GLUCOSE BLDC GLUCOMTR-MCNC: 253 MG/DL — HIGH (ref 70–99)
GLUCOSE SERPL-MCNC: 153 MG/DL — HIGH (ref 70–99)
HCT VFR BLD CALC: 33.3 % — LOW (ref 34.5–45)
HGB BLD-MCNC: 9.9 G/DL — LOW (ref 11.5–15.5)
MAGNESIUM SERPL-MCNC: 2 MG/DL — SIGNIFICANT CHANGE UP (ref 1.6–2.6)
MCHC RBC-ENTMCNC: 29.2 PG — SIGNIFICANT CHANGE UP (ref 27–34)
MCHC RBC-ENTMCNC: 29.7 GM/DL — LOW (ref 32–36)
MCV RBC AUTO: 98.2 FL — SIGNIFICANT CHANGE UP (ref 80–100)
NRBC # BLD: 0 /100 WBCS — SIGNIFICANT CHANGE UP (ref 0–0)
PHOSPHATE SERPL-MCNC: 4.5 MG/DL — SIGNIFICANT CHANGE UP (ref 2.5–4.5)
PLATELET # BLD AUTO: 437 K/UL — HIGH (ref 150–400)
POTASSIUM SERPL-MCNC: 4.1 MMOL/L — SIGNIFICANT CHANGE UP (ref 3.5–5.3)
POTASSIUM SERPL-SCNC: 4.1 MMOL/L — SIGNIFICANT CHANGE UP (ref 3.5–5.3)
PROT SERPL-MCNC: 6.5 G/DL — SIGNIFICANT CHANGE UP (ref 6–8.3)
RAPID RVP RESULT: SIGNIFICANT CHANGE UP
RBC # BLD: 3.39 M/UL — LOW (ref 3.8–5.2)
RBC # FLD: 16.5 % — HIGH (ref 10.3–14.5)
SARS-COV-2 RNA SPEC QL NAA+PROBE: SIGNIFICANT CHANGE UP
SODIUM SERPL-SCNC: 136 MMOL/L — SIGNIFICANT CHANGE UP (ref 135–145)
WBC # BLD: 11.94 K/UL — HIGH (ref 3.8–10.5)
WBC # FLD AUTO: 11.94 K/UL — HIGH (ref 3.8–10.5)

## 2023-02-01 PROCEDURE — 99231 SBSQ HOSP IP/OBS SF/LOW 25: CPT

## 2023-02-01 PROCEDURE — 99233 SBSQ HOSP IP/OBS HIGH 50: CPT | Mod: GC

## 2023-02-01 PROCEDURE — 99232 SBSQ HOSP IP/OBS MODERATE 35: CPT

## 2023-02-01 PROCEDURE — 71045 X-RAY EXAM CHEST 1 VIEW: CPT | Mod: 26

## 2023-02-01 RX ORDER — SODIUM BICARBONATE 1 MEQ/ML
650 SYRINGE (ML) INTRAVENOUS
Refills: 0 | Status: DISCONTINUED | OUTPATIENT
Start: 2023-02-01 | End: 2023-02-07

## 2023-02-01 RX ORDER — METOPROLOL TARTRATE 50 MG
100 TABLET ORAL EVERY 12 HOURS
Refills: 0 | Status: DISCONTINUED | OUTPATIENT
Start: 2023-02-01 | End: 2023-02-07

## 2023-02-01 RX ORDER — INSULIN GLARGINE 100 [IU]/ML
21 INJECTION, SOLUTION SUBCUTANEOUS AT BEDTIME
Refills: 0 | Status: DISCONTINUED | OUTPATIENT
Start: 2023-02-01 | End: 2023-02-03

## 2023-02-01 RX ORDER — INSULIN LISPRO 100/ML
7 VIAL (ML) SUBCUTANEOUS
Refills: 0 | Status: DISCONTINUED | OUTPATIENT
Start: 2023-02-01 | End: 2023-02-02

## 2023-02-01 RX ORDER — HYDROMORPHONE HYDROCHLORIDE 2 MG/ML
0.5 INJECTION INTRAMUSCULAR; INTRAVENOUS; SUBCUTANEOUS ONCE
Refills: 0 | Status: DISCONTINUED | OUTPATIENT
Start: 2023-02-01 | End: 2023-02-01

## 2023-02-01 RX ADMIN — ATORVASTATIN CALCIUM 40 MILLIGRAM(S): 80 TABLET, FILM COATED ORAL at 21:34

## 2023-02-01 RX ADMIN — PIPERACILLIN AND TAZOBACTAM 25 GRAM(S): 4; .5 INJECTION, POWDER, LYOPHILIZED, FOR SOLUTION INTRAVENOUS at 05:12

## 2023-02-01 RX ADMIN — Medication 200 MILLIGRAM(S): at 13:54

## 2023-02-01 RX ADMIN — Medication 650 MILLIGRAM(S): at 17:57

## 2023-02-01 RX ADMIN — APIXABAN 2.5 MILLIGRAM(S): 2.5 TABLET, FILM COATED ORAL at 17:54

## 2023-02-01 RX ADMIN — LIDOCAINE 15 MILLILITER(S): 4 CREAM TOPICAL at 11:29

## 2023-02-01 RX ADMIN — Medication 650 MILLIGRAM(S): at 06:12

## 2023-02-01 RX ADMIN — LIDOCAINE 15 MILLILITER(S): 4 CREAM TOPICAL at 17:54

## 2023-02-01 RX ADMIN — Medication 650 MILLIGRAM(S): at 12:28

## 2023-02-01 RX ADMIN — Medication 6 UNIT(S): at 10:01

## 2023-02-01 RX ADMIN — Medication 650 MILLIGRAM(S): at 00:37

## 2023-02-01 RX ADMIN — Medication 7 UNIT(S): at 18:38

## 2023-02-01 RX ADMIN — Medication 3 MILLILITER(S): at 05:13

## 2023-02-01 RX ADMIN — GABAPENTIN 200 MILLIGRAM(S): 400 CAPSULE ORAL at 11:29

## 2023-02-01 RX ADMIN — Medication 81 MILLIGRAM(S): at 11:28

## 2023-02-01 RX ADMIN — Medication 200 MILLIGRAM(S): at 21:33

## 2023-02-01 RX ADMIN — PIPERACILLIN AND TAZOBACTAM 25 GRAM(S): 4; .5 INJECTION, POWDER, LYOPHILIZED, FOR SOLUTION INTRAVENOUS at 17:57

## 2023-02-01 RX ADMIN — CHLORHEXIDINE GLUCONATE 1 APPLICATION(S): 213 SOLUTION TOPICAL at 10:00

## 2023-02-01 RX ADMIN — Medication 4: at 13:57

## 2023-02-01 RX ADMIN — Medication 6: at 18:39

## 2023-02-01 RX ADMIN — Medication 100 MILLIGRAM(S): at 17:54

## 2023-02-01 RX ADMIN — FLUCONAZOLE 200 MILLIGRAM(S): 150 TABLET ORAL at 17:54

## 2023-02-01 RX ADMIN — Medication 100 MILLIGRAM(S): at 05:12

## 2023-02-01 RX ADMIN — HYDROMORPHONE HYDROCHLORIDE 0.5 MILLIGRAM(S): 2 INJECTION INTRAMUSCULAR; INTRAVENOUS; SUBCUTANEOUS at 11:26

## 2023-02-01 RX ADMIN — INSULIN GLARGINE 21 UNIT(S): 100 INJECTION, SOLUTION SUBCUTANEOUS at 21:33

## 2023-02-01 RX ADMIN — Medication 650 MILLIGRAM(S): at 05:12

## 2023-02-01 RX ADMIN — Medication 200 MILLIGRAM(S): at 05:12

## 2023-02-01 RX ADMIN — Medication 6 UNIT(S): at 13:56

## 2023-02-01 RX ADMIN — BUMETANIDE 1 MILLIGRAM(S): 0.25 INJECTION INTRAMUSCULAR; INTRAVENOUS at 05:12

## 2023-02-01 RX ADMIN — HYDROMORPHONE HYDROCHLORIDE 0.5 MILLIGRAM(S): 2 INJECTION INTRAMUSCULAR; INTRAVENOUS; SUBCUTANEOUS at 12:26

## 2023-02-01 RX ADMIN — LIDOCAINE 15 MILLILITER(S): 4 CREAM TOPICAL at 05:13

## 2023-02-01 RX ADMIN — APIXABAN 2.5 MILLIGRAM(S): 2.5 TABLET, FILM COATED ORAL at 05:13

## 2023-02-01 RX ADMIN — Medication 650 MILLIGRAM(S): at 11:28

## 2023-02-01 NOTE — PROGRESS NOTE ADULT - PROBLEM SELECTOR PLAN 1
- presented with 1 week abdominal pain, nausea, vomiting  - initial CT A/P c/w cholecystitis  - per surgery, not a candidate given comorbidities  - s/p ERCP 1/20 w/ stent  - repeat CT A/P for recurrent abdominal pain c/f perforated gallbladder  - s/p perc rissa  - abdominal fluid cx w/ GPCs and candida    Plan:  - c/w zosyn, fluconazole per ID  - plan for repeat ERCP w/ stent removal in 2 - 3 months  - f/u repeat CT A/P for recurrent abdominal pain 1/31/23 and persistent leukocytosis - presented with 1 week abdominal pain, nausea, vomiting  - initial CT A/P c/w cholecystitis  - per surgery, not a candidate given comorbidities  - s/p ERCP 1/20 w/ stent  - repeat CT A/P for recurrent abdominal pain c/f perforated gallbladder  - perc rissa 1/25 - dislodged 2/1  - abdominal fluid cx w/ GPCs and candida  - repeat CT A/P for recurrent abdominal pain with dislodged cholecystostomy tube and increased fluid collection    Plan:  - c/w zosyn, fluconazole per ID  - plan for repeat ERCP w/ stent removal in 2 - 3 months  - follow fever/WBC curve, if c/f sepsis IR to replace dislodged rissa tube

## 2023-02-01 NOTE — PROGRESS NOTE ADULT - SUBJECTIVE AND OBJECTIVE BOX
Interventional Radiology Follow-Up Note.     Patient seen and examined @ bedside around 7:30am.     This is a 82y Female s/p cholecystostomy drain on 1/25 in Interventional Radiology with Dr. Conway.   CT abdomen reviewed with Dr. Conway, rissa tube noted to be coiled in the peritoneum.   Drain out put dropped from 800cc to 0cc.       Medication:   acyclovir   Oral Tab/Cap: (02-01)  apixaban: (02-01)  aspirin  chewable: (01-31)  buMETAnide: (02-01)  fluconAZOLE   Tablet: (01-31)  metoprolol tartrate: (01-31)  metoprolol tartrate: (02-01)  piperacillin/tazobactam IVPB..: (01-31)  piperacillin/tazobactam IVPB..: (02-01)    Vitals:   T(F): 97.5, Max: 98 (18:25)  HR: 63  BP: 134/58  RR: 18  SpO2: 99%    Physical Exam:  General: Nontoxic, in NAD.  Abdomen: soft, NTND.    Drain Device: Drain intact attached to gravity drain bag now with no output.  Flushed with 5cc NS w/o difficulty. Dressing clean, dry, intact.   24hr Drain output: 800cc in 12hrs. 0cc after that.     LABS:  WBC 11.94 / Hgb 9.9 / Hct 33.3 / Plt 437  Na 136 / K 4.1 / CO2 16 / Cl 105 / BUN 50 / Cr 3.41 / Glucose 153  ALT 13 / AST 13 / Alk Phos 130 / Tbili 0.2  Ptt -- / Pt -- / INR --    Culture - Body Fluid with Gram Stain (01.25.23 @ 15:34)    -  Fluconazole: S 0.5 Fluconazole: Susceptibility depends on achieving the maximum possible blood level. Doses higher than the standard dosing amount (6mg/kg/day) may be needed in adults with normal renal function and body habitus.    This test was developed and itsperformance characteristics determined by [facility] - [facility], [place], [place]  It has not been cleared or approved by the U.S. Food and Drug Administration. S - Susceptible; SDD - Susceptible Dose Dependent; I - Intermediate; R - Resistant; N/I - No Interpretation Available    Gram Stain:   No polymorphonuclear cells seen per low power field  Few Gram Positive Rods per oil power field    Specimen Source: Abdominal Fl Abdominal Fluid    Culture Results:   Rare Candida albicans  Please Note:************************************************  GPR seen in Gram stain is non-viable after prolonged  incubation and repeated subculture.    Organism Identification: Candida albicans    Organism: Candida albicans    Method Type: [facility]      Assessment/Plan: 82F history of HTN, HLD, DM2, HFpEF (EF 65%), CAD s/p CABG, Breast Ca s/p R partial mastectomy, Rectal Ca s/p resection presenting with abdominal pain x 1 week found to have acute cholecystitis, not a surgical candidate. S/p ERCP with stent placement course c/b gallbladder perforation s/p perc rissa w/ fluid c/f candida and GPCs on zosyn, fluconazole.    - CT abdomen reviewed with Dr. Conway, rissa tube noted to be coiled in the peritoneum.   - Do not flush the drain.   - Continue to monitor pt's clinical. if she develops signs of sepsis will plan for re insertion.   - D/w Dr. Partida and Dr. Conway.            Please call [phone]  [phone] with any questions, concerns, or issues regarding above.    Also available on Teams.

## 2023-02-01 NOTE — PROGRESS NOTE ADULT - SUBJECTIVE AND OBJECTIVE BOX
Luciano Partida PGY1  pager [phone] or check resident tab for coverage    Patient is a 82y old  Female who presents with a chief complaint of Per chart, "Patient is a 82 year old F with a PMHx of HTN, HLD, T2DM, HFpEF (EF 65% X/2022), breast cancer s/p R partial mastectomy, rectal carcinoma s/p resection, CAD s/p CABG who presents for 1 week of intermittent abdominal pain associated with nausea and vomiting, found to have acute cholecystitis with choledocholithiasis. Patient initially underwent ERCP with CBD placement but was found to have a new fluid collection adjacent to the gallbladder neck, now s/p percutaneous cholecystostomy, with fluid cx concerning for biliary candidiasis. Hospital course c/b recurrent SVT, likely A flutter. "     (31 Jan 2023 16:40)    SUBJECTIVE / OVERNIGHT EVENTS:    NAEO.  Patient this morning is,    Brief Daily Plan:    MEDICATIONS  MEDICATIONS  (STANDING):  acetaminophen     Tablet .. 650 milliGRAM(s) Oral every 6 hours  acyclovir   Oral Tab/Cap 200 milliGRAM(s) Oral every 8 hours  apixaban 2.5 milliGRAM(s) Oral two times a day  aspirin  chewable 81 milliGRAM(s) Oral daily  atorvastatin 40 milliGRAM(s) Oral at bedtime  buMETAnide 1 milliGRAM(s) Oral daily  chlorhexidine 4% Liquid 1 Application(s) Topical <User Schedule>  dextrose 5%. 1000 milliLiter(s) (100 mL/Hr) IV Continuous <Continuous>  dextrose 5%. 1000 milliLiter(s) (50 mL/Hr) IV Continuous <Continuous>  dextrose 50% Injectable 25 Gram(s) IV Push once  dextrose 50% Injectable 12.5 Gram(s) IV Push once  dextrose 50% Injectable 25 Gram(s) IV Push once  fluconAZOLE   Tablet 200 milliGRAM(s) Oral every 24 hours  gabapentin 200 milliGRAM(s) Oral daily  glucagon  Injectable 1 milliGRAM(s) IntraMuscular once  insulin glargine Injectable (LANTUS) 19 Unit(s) SubCutaneous at bedtime  insulin lispro (ADMELOG) corrective regimen sliding scale   SubCutaneous at bedtime  insulin lispro (ADMELOG) corrective regimen sliding scale   SubCutaneous three times a day before meals  insulin lispro Injectable (ADMELOG) 6 Unit(s) SubCutaneous three times a day before meals  lidocaine 2% Viscous 15 milliLiter(s) Oral four times a day  metoprolol tartrate 100 milliGRAM(s) Oral every 12 hours  piperacillin/tazobactam IVPB.. 3.375 Gram(s) IV Intermittent every 12 hours    MEDICATIONS  (PRN):  albuterol/ipratropium for Nebulization 3 milliLiter(s) Nebulizer every 6 hours PRN Shortness of Breath and/or Wheezing  aluminum hydroxide/magnesium hydroxide/simethicone Suspension 30 milliLiter(s) Oral every 6 hours PRN Dyspepsia  dextrose Oral Gel 15 Gram(s) Oral once PRN Blood Glucose LESS THAN 70 milliGRAM(s)/deciliter  sodium chloride 0.9% lock flush 10 milliLiter(s) IV Push every 1 hour PRN Pre/post blood products, medications, blood draw, and to maintain line patency      VITALS  Vital Signs Last 24 Hrs  T(C): 36.7 (01 Feb 2023 05:00), Max: 36.7 (31 Jan 2023 18:25)  T(F): 98 (01 Feb 2023 05:00), Max: 98 (31 Jan 2023 18:25)  HR: 67 (01 Feb 2023 05:00) (64 - 75)  BP: 131/75 (01 Feb 2023 05:00) (105/62 - 131/75)  BP(mean): --  RR: 18 (01 Feb 2023 05:00) (18 - 18)  SpO2: 96% (01 Feb 2023 05:00) (96% - 100%)    Parameters below as of 01 Feb 2023 05:00  Patient On (Oxygen Delivery Method): nasal cannula  O2 Flow (L/min): 1      PHYSICAL EXAM:  GENERAL: no distress  PSYCH: A&O x3  HEAD: Atraumatic, Normocephalic  NECK: Supple, No JVD  CHEST/LUNG: clear to auscultation bilaterally  HEART: regular rate and rhythm, no murmurs  ABDOMEN: nontender to palpation, no rebound tenderness/guarding  EXTREMITIES: no edema on bilateral LE  NEUROLOGY: no focal neurologic deficit  SKIN: No rashes or lesions    LABS:  All labs personally Reviewed.    RADIOLOGY & ADDITIONAL TESTS:  All imaging personally Reviewed.  Luciano Partida PGY1  pager [phone] or check resident tab for coverage    82F history of HTN, HLD, DM2, HFpEF (EF 65%), CAD s/p CABG, Breast Ca s/p R partial mastectomy, Rectal Ca s/p resection presenting with abdominal pain x 1 week found to have acute cholecystitis, not a surgical candidate. S/p ERCP with stent placement course c/b gallbladder perforation s/p perc rissa w/ fluid c/f candida and GPCs on zosyn, fluconazole; perc rissa dislodged on 2/1/23. 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     SUBJECTIVE / OVERNIGHT EVENTS:    NAEO.  Patient this morning is in pain.  Right abdominal pain is still present from yesterday, but mildly improved, still 7/10.  Denies fever/chills.   Additionally with cough and subjective difficulty breathing.  Oral pain and burning is present but improving, still unable to tolerate food.     Brief Daily Plan:  ·	appreciate consulted services  ·	perc rissa dislodged internally, coiled in peritoneum, to be removed at bedside by IR  ·	monitor WBC/fever curve, if c/f sepsis IR to replace perc rissa  ·	c/w zosyn, fluconazole, acyclovir  ·	f/u RVP, CXR  ·	c/w diuresis    MEDICATIONS  MEDICATIONS  (STANDING):  acetaminophen     Tablet .. 650 milliGRAM(s) Oral every 6 hours  acyclovir   Oral Tab/Cap 200 milliGRAM(s) Oral every 8 hours  apixaban 2.5 milliGRAM(s) Oral two times a day  aspirin  chewable 81 milliGRAM(s) Oral daily  atorvastatin 40 milliGRAM(s) Oral at bedtime  buMETAnide 1 milliGRAM(s) Oral daily  chlorhexidine 4% Liquid 1 Application(s) Topical <User Schedule>  dextrose 5%. 1000 milliLiter(s) (100 mL/Hr) IV Continuous <Continuous>  dextrose 5%. 1000 milliLiter(s) (50 mL/Hr) IV Continuous <Continuous>  dextrose 50% Injectable 25 Gram(s) IV Push once  dextrose 50% Injectable 12.5 Gram(s) IV Push once  dextrose 50% Injectable 25 Gram(s) IV Push once  fluconAZOLE   Tablet 200 milliGRAM(s) Oral every 24 hours  gabapentin 200 milliGRAM(s) Oral daily  glucagon  Injectable 1 milliGRAM(s) IntraMuscular once  insulin glargine Injectable (LANTUS) 19 Unit(s) SubCutaneous at bedtime  insulin lispro (ADMELOG) corrective regimen sliding scale   SubCutaneous at bedtime  insulin lispro (ADMELOG) corrective regimen sliding scale   SubCutaneous three times a day before meals  insulin lispro Injectable (ADMELOG) 6 Unit(s) SubCutaneous three times a day before meals  lidocaine 2% Viscous 15 milliLiter(s) Oral four times a day  metoprolol tartrate 100 milliGRAM(s) Oral every 12 hours  piperacillin/tazobactam IVPB.. 3.375 Gram(s) IV Intermittent every 12 hours    MEDICATIONS  (PRN):  albuterol/ipratropium for Nebulization 3 milliLiter(s) Nebulizer every 6 hours PRN Shortness of Breath and/or Wheezing  aluminum hydroxide/magnesium hydroxide/simethicone Suspension 30 milliLiter(s) Oral every 6 hours PRN Dyspepsia  dextrose Oral Gel 15 Gram(s) Oral once PRN Blood Glucose LESS THAN 70 milliGRAM(s)/deciliter  sodium chloride 0.9% lock flush 10 milliLiter(s) IV Push every 1 hour PRN Pre/post blood products, medications, blood draw, and to maintain line patency      VITALS  Vital Signs Last 24 Hrs  T(C): 36.7 (01 Feb 2023 05:00), Max: 36.7 (31 Jan 2023 18:25)  T(F): 98 (01 Feb 2023 05:00), Max: 98 (31 Jan 2023 18:25)  HR: 67 (01 Feb 2023 05:00) (64 - 75)  BP: 131/75 (01 Feb 2023 05:00) (105/62 - 131/75)  BP(mean): --  RR: 18 (01 Feb 2023 05:00) (18 - 18)  SpO2: 96% (01 Feb 2023 05:00) (96% - 100%)    Parameters below as of 01 Feb 2023 05:00  Patient On (Oxygen Delivery Method): nasal cannula  O2 Flow (L/min): 1      PHYSICAL EXAM:  GENERAL: no distress  PSYCH: A&O x3  HEAD: Atraumatic, Normocephalic  NECK: Supple, No JVD  CHEST/LUNG: overall clear, mild crackles  HEART: regular rate and rhythm, no murmurs  ABDOMEN: Tender to palpation right abdomen, RUQ, no rebound, some voluntary guarding  EXTREMITIES: trace BLE edema at ankles  NEUROLOGY: no focal neurologic deficit  SKIN: No rashes or lesions    LABS:  All labs personally Reviewed.    RADIOLOGY & ADDITIONAL TESTS:  All imaging personally Reviewed.

## 2023-02-01 NOTE — PROGRESS NOTE ADULT - ATTENDING COMMENTS
Acute cholecystitis s/p per rissa, on Zosyn.     Repeat CT  yesterday- Slight interval increase in fluid collection adjacent to the gallbladder neck. This can be secondary to gallbladder perforation, in the setting of acute cholecystitis. Percutaneous drainage catheter coiled in the right upper quadrant adjacent to the gallbladder. Distal CBD stent and adjacent CBD stones, unchanged. Marked chronic right hydronephrosis in a UPJ pattern. Adjacent perinephric fluid and loculated right retroperitoneal fluid can be secondary to a superimposed infection, new since 1/18/2023.    Increased pain today, noted to have fluctuance and tenderness in RUQ. Tube removed by IR- monitor for fever or worsening symptoms.

## 2023-02-01 NOTE — PROGRESS NOTE ADULT - PROBLEM SELECTOR PLAN 3
- Hx of HFpEF  - recent admission 8/2022 for ADHF   - Echo w/ EF 60%, increased E/e' on 1/30/23  - c/w bumex 1 qD   - strict I&O, weights daily  - appreciate cardiology recs - Hx of HFpEF  - recent admission 8/2022 for ADHF   - Echo w/ EF 60%, increased E/e' on 1/30/23  - c/w bumex 1 qD  - strict I&O, weights daily  - appreciate cardiology recs

## 2023-02-01 NOTE — PROGRESS NOTE ADULT - PROBLEM SELECTOR PLAN 4
- patient with sustained episodes of SVT to 140s requiring adenosine  - unclear etiology  - appreciate EP recs  - stable, no further episodes on lopressor 50 q6h  - transition to lopressor 100 q12h - patient with sustained episodes of SVT to 140s requiring adenosine  - unclear etiology  - appreciate EP recs  - stable, no further episodes on lopressor  - c/w lopressor 100mg q12h

## 2023-02-01 NOTE — PROGRESS NOTE ADULT - ASSESSMENT
82F history of HTN, HLD, DM2, HFpEF (EF 65%), CAD s/p CABG, Breast Ca s/p R partial mastectomy, Rectal Ca s/p resection presenting with abdominal pain x 1 week found to have acute cholecystitis, not a surgical candidate. S/p ERCP with stent placement course c/b gallbladder perforation s/p perc rissa w/ fluid c/f candida and GPCs on zosyn, fluconazole. 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  82F history of HTN, HLD, DM2, HFpEF (EF 65%), CAD s/p CABG, Breast Ca s/p R partial mastectomy, Rectal Ca s/p resection presenting with abdominal pain x 1 week found to have acute cholecystitis, not a surgical candidate. S/p ERCP with stent placement course c/b gallbladder perforation s/p perc rissa w/ fluid c/f candida and GPCs on zosyn, fluconazole; perc rissa dislodged on 2/1/23. 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

## 2023-02-01 NOTE — PROGRESS NOTE ADULT - SUBJECTIVE AND OBJECTIVE BOX
DATE OF SERVICE: 02-01-23 @ 16:13    Patient is a 82y old  Female who presents with a chief complaint of acute cholecystitis, DKA (01 Feb 2023 12:22)      INTERVAL HISTORY: Feels ok.     REVIEW OF SYSTEMS:  CONSTITUTIONAL: No weakness  EYES/ENT: No visual changes;  No throat pain   NECK: No pain or stiffness  RESPIRATORY: No cough, wheezing; No shortness of breath  CARDIOVASCULAR: No chest pain or palpitations  GASTROINTESTINAL: No abdominal  pain. No nausea, vomiting, or hematemesis  GENITOURINARY: No dysuria, frequency or hematuria  NEUROLOGICAL: No stroke like symptoms  SKIN: No rashes    TELEMETRY Personally reviewed: SR 60-70  	  MEDICATIONS:  buMETAnide 1 milliGRAM(s) Oral daily  metoprolol tartrate 100 milliGRAM(s) Oral every 12 hours        PHYSICAL EXAM:  T(C): 36.4 (02-01-23 @ 13:28), Max: 36.7 (01-31-23 @ 18:25)  HR: 69 (02-01-23 @ 13:28) (63 - 71)  BP: 108/57 (02-01-23 @ 13:28) (105/62 - 134/58)  RR: 18 (02-01-23 @ 13:28) (18 - 18)  SpO2: 98% (02-01-23 @ 13:28) (96% - 100%)  Wt(kg): --  I&O's Summary    31 Jan 2023 07:01  -  01 Feb 2023 07:00  --------------------------------------------------------  IN: 1000 mL / OUT: 1650 mL / NET: -650 mL    01 Feb 2023 07:01  -  01 Feb 2023 16:13  --------------------------------------------------------  IN: 0 mL / OUT: 500 mL / NET: -500 mL          Appearance: In no distress	  HEENT:    PERRL, EOMI	  Cardiovascular:  S1 S2, No JVD  Respiratory: Lungs clear to auscultation	  Gastrointestinal:  Soft, Non-tender, + BS	  Vascularature:  No edema of LE  Psychiatric: Appropriate affect   Neuro: no acute focal deficits                               9.9    11.94 )-----------( 437      ( 01 Feb 2023 07:15 )             33.3     02-01    136  |  105  |  50<H>  ----------------------------<  153<H>  4.1   |  16<L>  |  3.41<H>    Ca    9.2      01 Feb 2023 07:15  Phos  4.5     02-01  Mg     2.0     02-01    TPro  6.5  /  Alb  2.7<L>  /  TBili  0.2  /  DBili  x   /  AST  13  /  ALT  13  /  AlkPhos  130<H>  02-01        Labs personally reviewed      ASSESSMENT/PLAN: 	    80 year old woman with CAD s/p CABG in 2020, prior DVT on Eliquis, HTN, and compensated diastolic CHF presents with acute cholecystitis.       #Post-operative risk evaluation-  S/p ERCP with CBD stent. Tolerate procedure well.   Ms. Batista displays no signs or symptoms of acute coronary ischemia or decompensated CHF.  Admission EKG is sinus with pre-existing anteroseptal q-waves.  Recent echo noted- normal LV systolic function with no hemodynamically significant valvular disease.  s/p drain placement via IR 1/25    #Decompensated diastolic CHF-  Patient with severe pulm pressures on echo  c/w Bumex 1mg PO daily    #CAD s/p CABG-  No signs of active ischemic.  c/w ASA and statin  - 1/26 with c/o epigastric burning a/b episodes of emesis. ECG performed, no ischemic changes noted.   -1/28 c/o chest pain and epigastric pain similar to 2 days ago. Repeat EKG with no ischemic changes. Trop wnl.  This is most likely noncardiac in nature given patient's recent cholecystitis     #Sepsis   - 2/2 acute cholecystitis   - S/p CBD stent  - s/p IR drain 1/25  - Afebrile, WBC downtrending    #SVT  - 1/26 patent HR in 130s while sleeping. Patient missed am dose of Metoprolol 150mg PO.  - Resume Metoprolol  - Adenosine 6mg IV x1 with termination of SVT  - cont to monitor on tele  - 1/27 with recurrent SVT s/p adenosine today   - EP consulted. Likely A-Tach.   - HR has been stable. Metoprolol decreased to 100mg PO BID.       Delaney Vasquez, FABIOLA-NP   Sergey Winchester DO [facility]  Cardiovascular Medicine  [address] [place] [address], [address]  Available through call or text on [employer]  Office: [phone]

## 2023-02-01 NOTE — PROGRESS NOTE ADULT - SUBJECTIVE AND OBJECTIVE BOX
CC: Patient is a 82y old  Female who presents with a chief complaint of acute cholecystitis, DKA (01 Feb 2023 12:22)    ID following for acute cholecystitis    Interval History/ROS: Patient with slight interval increase in fluid collection next to GB neck may be secondary to GB perforation. Drainage catheter in the RUQ adjacted to the GB. Also with new R perinephric fluid and loculated R RP fluid collection.    Rest of ROS negative.    Allergies  CT scan dye (Hives)  penicillin (Unknown)    ANTIMICROBIALS:  acyclovir   Oral Tab/Cap 200 every 8 hours  fluconAZOLE   Tablet 200 every 24 hours  piperacillin/tazobactam IVPB.. 3.375 every 12 hours    OTHER MEDS:  albuterol/ipratropium for Nebulization 3 milliLiter(s) Nebulizer every 6 hours PRN  aluminum hydroxide/magnesium hydroxide/simethicone Suspension 30 milliLiter(s) Oral every 6 hours PRN  apixaban 2.5 milliGRAM(s) Oral two times a day  aspirin  chewable 81 milliGRAM(s) Oral daily  atorvastatin 40 milliGRAM(s) Oral at bedtime  buMETAnide 1 milliGRAM(s) Oral daily  chlorhexidine 4% Liquid 1 Application(s) Topical <User Schedule>  dextrose 5%. 1000 milliLiter(s) IV Continuous <Continuous>  dextrose 5%. 1000 milliLiter(s) IV Continuous <Continuous>  dextrose 50% Injectable 25 Gram(s) IV Push once  dextrose 50% Injectable 12.5 Gram(s) IV Push once  dextrose 50% Injectable 25 Gram(s) IV Push once  dextrose Oral Gel 15 Gram(s) Oral once PRN  gabapentin 200 milliGRAM(s) Oral daily  glucagon  Injectable 1 milliGRAM(s) IntraMuscular once  insulin glargine Injectable (LANTUS) 21 Unit(s) SubCutaneous at bedtime  insulin lispro (ADMELOG) corrective regimen sliding scale   SubCutaneous at bedtime  insulin lispro (ADMELOG) corrective regimen sliding scale   SubCutaneous three times a day before meals  insulin lispro Injectable (ADMELOG) 7 Unit(s) SubCutaneous three times a day before meals  lidocaine 2% Viscous 15 milliLiter(s) Oral four times a day  metoprolol tartrate 100 milliGRAM(s) Oral every 12 hours  sodium bicarbonate 650 milliGRAM(s) Oral two times a day  sodium chloride 0.9% lock flush 10 milliLiter(s) IV Push every 1 hour PRN    PE:    Vital Signs Last 24 Hrs  T(C): 36.4 (01 Feb 2023 13:28), Max: 36.7 (31 Jan 2023 18:25)  T(F): 97.5 (01 Feb 2023 13:28), Max: 98 (31 Jan 2023 18:25)  HR: 69 (01 Feb 2023 13:28) (63 - 71)  BP: 108/57 (01 Feb 2023 13:28) (105/62 - 134/58)  BP(mean): --  RR: 18 (01 Feb 2023 13:28) (18 - 18)  SpO2: 98% (01 Feb 2023 13:28) (96% - 100%)    Parameters below as of 01 Feb 2023 13:28  Patient On (Oxygen Delivery Method): nasal cannula  O2 Flow (L/min): 1    Gen: AOx3, NAD  CV: S1+S2 normal, no murmurs  Resp: Clear bilat, no resp distress  Abd: Soft, nontender, +BS, biliary drain in place  Ext: No LE edema, no wounds  : No [place]  IV/Skin: No thrombophlebitis  Neuro: no focal deficits    LABS:                          9.9    11.94 )-----------( 437      ( 01 Feb 2023 07:15 )             33.3       02-01    136  |  105  |  50<H>  ----------------------------<  153<H>  4.1   |  16<L>  |  3.41<H>    Ca    9.2      01 Feb 2023 07:15  Phos  4.5     02-01  Mg     2.0     02-01    TPro  6.5  /  Alb  2.7<L>  /  TBili  0.2  /  DBili  x   /  AST  13  /  ALT  13  /  AlkPhos  130<H>  02-01          MICROBIOLOGY:  v  Abdominal Fl Abdominal Fluid  01-25-23   Rare Candida albicans  Please Note:************************************************  GPR seen in Gram stain is non-viable after prolonged  incubation and repeated subculture.  --  Candida albicans      .Blood Blood-Peripheral  01-22-23   No Growth Final  --  --      .Blood Blood-Peripheral  01-22-23   No Growth Final  --  --      .Blood Blood-Venous  01-20-23   No Growth Final  --  --    Rapid RVP Result: NotDetec (02-01 @ 11:11)    RADIOLOGY:    < from: Xray Chest 1 View- PORTABLE-Urgent (Xray Chest 1 View- PORTABLE-Urgent .) (02.01.23 @ 09:58) >  IMPRESSION:    Slightly decreased pulmonary edema.      < end of copied text >    < from: CT Abdomen and Pelvis No Cont (01.31.23 @ 22:59) >    IMPRESSION:  Slight interval increase in fluid collection adjacent to the gallbladder   neck. This can be secondary to gallbladder perforation, in the setting of   acute cholecystitis. Percutaneous drainage catheter coiled in the right   upper quadrant adjacent to the gallbladder. Distal CBD stent and adjacent   CBD stones, unchanged.  Marked chronic right hydronephrosis in a UPJ pattern. Adjacent   perinephric fluid and loculated right retroperitoneal fluid can be   secondary to a superimposed infection, new since 1/18/2023.      < end of copied text >

## 2023-02-01 NOTE — PROGRESS NOTE ADULT - SUBJECTIVE AND OBJECTIVE BOX
NEPHROLOGY     Patient seen and examined    MEDICATIONS  (STANDING):  acetaminophen     Tablet .. 650 milliGRAM(s) Oral every 6 hours  acyclovir   Oral Tab/Cap 200 milliGRAM(s) Oral every 8 hours  apixaban 2.5 milliGRAM(s) Oral two times a day  aspirin  chewable 81 milliGRAM(s) Oral daily  atorvastatin 40 milliGRAM(s) Oral at bedtime  buMETAnide 1 milliGRAM(s) Oral daily  chlorhexidine 4% Liquid 1 Application(s) Topical <User Schedule>  dextrose 5%. 1000 milliLiter(s) (100 mL/Hr) IV Continuous <Continuous>  dextrose 5%. 1000 milliLiter(s) (50 mL/Hr) IV Continuous <Continuous>  dextrose 50% Injectable 25 Gram(s) IV Push once  dextrose 50% Injectable 12.5 Gram(s) IV Push once  dextrose 50% Injectable 25 Gram(s) IV Push once  fluconAZOLE   Tablet 200 milliGRAM(s) Oral every 24 hours  gabapentin 200 milliGRAM(s) Oral daily  glucagon  Injectable 1 milliGRAM(s) IntraMuscular once  insulin glargine Injectable (LANTUS) 19 Unit(s) SubCutaneous at bedtime  insulin lispro (ADMELOG) corrective regimen sliding scale   SubCutaneous three times a day before meals  insulin lispro (ADMELOG) corrective regimen sliding scale   SubCutaneous at bedtime  insulin lispro Injectable (ADMELOG) 6 Unit(s) SubCutaneous three times a day before meals  lidocaine 2% Viscous 15 milliLiter(s) Oral four times a day  metoprolol tartrate 100 milliGRAM(s) Oral every 12 hours  piperacillin/tazobactam IVPB.. 3.375 Gram(s) IV Intermittent every 12 hours  potassium chloride   Powder 20 milliEquivalent(s) Oral once    VITALS:  T(C): , Max: 36.6 (01-30-23 @ 17:16)  T(F): , Max: 97.9 (01-30-23 @ 17:16)  HR: 68 (01-31-23 @ 13:06)  BP: 128/78 (01-31-23 @ 13:06)  RR: 18 (01-31-23 @ 13:06)  SpO2: 100% (01-31-23 @ 13:06)    I and O's:    01-30 @ 07:01  -  01-31 @ 07:00  --------------------------------------------------------  IN: 820 mL / OUT: 2200 mL / NET: -1380 mL    01-31 @ 07:01  -  01-31 @ 13:49  --------------------------------------------------------  IN: 400 mL / OUT: 900 mL / NET: -500 mL    Weight (kg): 82.3 (01-30 @ 14:07)    PHYSICAL EXAM:  General: NAD; Alert  HEENT:  NCAT; PERRLA  Neck: No JVD; supple  Respiratory: CTA-b/l  Cardiac: RRR s1s2  Gastrointestinal: BS+, soft, NT/ND , biliary drain in placed   Urologic: No chen  Extremities: No peripheral edema, lymphedema at baseline     LABS:                        10.3   11.23 )-----------( [id number]      ( 31 Jan 2023 06:01 )             34.3     01-31    137  |  107  |  50<H>  ----------------------------<  196<H>  3.9   |  18<L>  |  3.45<H>    Ca    9.1      31 Jan 2023 06:01  Phos  4.6     01-31  Mg     2.1     01-31    TPro  6.6  /  Alb  2.9<L>  /  TBili  0.2  /  DBili  x   /  AST  13  /  ALT  10  /  AlkPhos  108  01-31   NEPHROLOGY     Patient seen and examined  no distress    saturation  well on NC     MEDICATIONS  (STANDING):  acetaminophen     Tablet .. 650 milliGRAM(s) Oral every 6 hours  acyclovir   Oral Tab/Cap 200 milliGRAM(s) Oral every 8 hours  apixaban 2.5 milliGRAM(s) Oral two times a day  aspirin  chewable 81 milliGRAM(s) Oral daily  atorvastatin 40 milliGRAM(s) Oral at bedtime  buMETAnide 1 milliGRAM(s) Oral daily  chlorhexidine 4% Liquid 1 Application(s) Topical <User Schedule>  dextrose 5%. 1000 milliLiter(s) (100 mL/Hr) IV Continuous <Continuous>  dextrose 5%. 1000 milliLiter(s) (50 mL/Hr) IV Continuous <Continuous>  dextrose 50% Injectable 25 Gram(s) IV Push once  dextrose 50% Injectable 12.5 Gram(s) IV Push once  dextrose 50% Injectable 25 Gram(s) IV Push once  fluconAZOLE   Tablet 200 milliGRAM(s) Oral every 24 hours  gabapentin 200 milliGRAM(s) Oral daily  glucagon  Injectable 1 milliGRAM(s) IntraMuscular once  insulin glargine Injectable (LANTUS) 19 Unit(s) SubCutaneous at bedtime  insulin lispro (ADMELOG) corrective regimen sliding scale   SubCutaneous three times a day before meals  insulin lispro (ADMELOG) corrective regimen sliding scale   SubCutaneous at bedtime  insulin lispro Injectable (ADMELOG) 6 Unit(s) SubCutaneous three times a day before meals  lidocaine 2% Viscous 15 milliLiter(s) Oral four times a day  metoprolol tartrate 100 milliGRAM(s) Oral every 12 hours  piperacillin/tazobactam IVPB.. 3.375 Gram(s) IV Intermittent every 12 hours  potassium chloride   Powder 20 milliEquivalent(s) Oral once    VITALS:  T(C): , Max: 36.6 (01-30-23 @ 17:16)  T(F): , Max: 97.9 (01-30-23 @ 17:16)  HR: 68 (01-31-23 @ 13:06)  BP: 128/78 (01-31-23 @ 13:06)  RR: 18 (01-31-23 @ 13:06)  SpO2: 100% (01-31-23 @ 13:06)    I and O's:    01-30 @ 07:01  -  01-31 @ 07:00  --------------------------------------------------------  IN: 820 mL / OUT: 2200 mL / NET: -1380 mL    01-31 @ 07:01  -  01-31 @ 13:49  --------------------------------------------------------  IN: 400 mL / OUT: 900 mL / NET: -500 mL    Weight (kg): 82.3 (01-30 @ 14:07)    PHYSICAL EXAM:  General: NAD; Alert  HEENT:  NCAT; PERRLA  Neck: No JVD; supple  Respiratory: CTA-b/l  Cardiac: RRR s1s2  Gastrointestinal: BS+, soft, NT/ND , biliary drain in placed   Urologic: No chen  Extremities: No peripheral edema, lymphedema at baseline     LABS:                        10.3   11.23 )-----------( 418      ( 31 Jan 2023 06:01 )             34.3     01-31    137  |  107  |  50<H>  ----------------------------<  196<H>  3.9   |  18<L>  |  3.45<H>    Ca    9.1      31 Jan 2023 06:01  Phos  4.6     01-31  Mg     2.1     01-31    TPro  6.6  /  Alb  2.9<L>  /  TBili  0.2  /  DBili  x   /  AST  13  /  ALT  10  /  AlkPhos  108  01-31

## 2023-02-01 NOTE — PROGRESS NOTE ADULT - SUBJECTIVE AND OBJECTIVE BOX
EP    Date of service 2/1  Uneventful overnight. Resting in bed. No arrhythmias noted.      acetaminophen     Tablet .. 650 milliGRAM(s) Oral every 6 hours  acyclovir   Oral Tab/Cap 200 milliGRAM(s) Oral every 8 hours  albuterol/ipratropium for Nebulization 3 milliLiter(s) Nebulizer every 6 hours PRN  aluminum hydroxide/magnesium hydroxide/simethicone Suspension 30 milliLiter(s) Oral every 6 hours PRN  apixaban 2.5 milliGRAM(s) Oral two times a day  aspirin  chewable 81 milliGRAM(s) Oral daily  atorvastatin 40 milliGRAM(s) Oral at bedtime  buMETAnide 1 milliGRAM(s) Oral daily  chlorhexidine 4% Liquid 1 Application(s) Topical <User Schedule>  dextrose 5%. 1000 milliLiter(s) IV Continuous <Continuous>  dextrose 5%. 1000 milliLiter(s) IV Continuous <Continuous>  dextrose 50% Injectable 25 Gram(s) IV Push once  dextrose 50% Injectable 12.5 Gram(s) IV Push once  dextrose 50% Injectable 25 Gram(s) IV Push once  dextrose Oral Gel 15 Gram(s) Oral once PRN  fluconAZOLE   Tablet 200 milliGRAM(s) Oral every 24 hours  gabapentin 200 milliGRAM(s) Oral daily  glucagon  Injectable 1 milliGRAM(s) IntraMuscular once  insulin glargine Injectable (LANTUS) 19 Unit(s) SubCutaneous at bedtime  insulin lispro (ADMELOG) corrective regimen sliding scale   SubCutaneous three times a day before meals  insulin lispro (ADMELOG) corrective regimen sliding scale   SubCutaneous at bedtime  insulin lispro Injectable (ADMELOG) 6 Unit(s) SubCutaneous three times a day before meals  lidocaine 2% Viscous 15 milliLiter(s) Oral four times a day  metoprolol tartrate 100 milliGRAM(s) Oral every 12 hours  piperacillin/tazobactam IVPB.. 3.375 Gram(s) IV Intermittent every 12 hours  sodium chloride 0.9% lock flush 10 milliLiter(s) IV Push every 1 hour PRN                            9.9    11.94 )-----------( 437      ( 01 Feb 2023 07:15 )             33.3       02-01    136  |  105  |  50<H>  ----------------------------<  153<H>  4.1   |  16<L>  |  3.41<H>    Ca    9.2      01 Feb 2023 07:15  Phos  4.5     02-01  Mg     2.0     02-01    TPro  6.5  /  Alb  2.7<L>  /  TBili  0.2  /  DBili  x   /  AST  13  /  ALT  13  /  AlkPhos  130<H>  02-01      T(C): 36.4 (02-01-23 @ 09:15), Max: 36.7 (01-31-23 @ 18:25)  HR: 63 (02-01-23 @ 09:15) (63 - 71)  BP: 134/58 (02-01-23 @ 09:15) (105/62 - 134/58)  RR: 18 (02-01-23 @ 09:15) (18 - 18)  SpO2: 99% (02-01-23 @ 09:15) (96% - 100%)  Wt(kg): --    I&O's Summary    31 Jan 2023 07:01  -  01 Feb 2023 07:00  --------------------------------------------------------  IN: 1000 mL / OUT: 1650 mL / NET: -650 mL    Gen: Appears fatigued but A&O x 3  HEENT:  (-)icterus (-)pallor  CV: N S1 S2 1/6 JAKY (+)2 Pulses B/l  Resp:  Clear to ausculatation B/L, normal effort  GI: (+) BS Soft, NT, ND  Lymph:  (-)Edema, (-)obvious lymphadenopathy  Skin: Warm to touch, Normal turgor  Psych: Appropriate mood and affect    TELEMETRY: 	SR    < from: TTE with Doppler (w/Cont) (09.09.22 @ 09:49) >  Conclusions:  1. Mild mitral annular calcification. Mitral annular  dilatation with normal leaflet opening. Mild mitral  regurgitation.  2. Aortic valve not well visualized; appears calcified.  Minimal aortic regurgitation.  3. Endocardial visualization enhanced with intravenous  injection of Ultrasonic Enhancing Agent (Definity). Left  ventricle suboptimally visualized despite intravenous  ultrasonic enhancing agent; the apex is foreshortened on  apical images. Overall normal left ventricular systolic  function.  4. Increased E/e' suggestive of elevated left ventricular  filling pressure.  5. The right ventricle is not well visualized; grossly  normal right ventricular size and systolic function.  *** Compared with echocardiogram of 1/11/2022, increased  E/e' suggestive of increased LV filling pressure is noted  on today's study.  ------------------------------------------------------------------------  Confirmed on  9/9/2022 - 12:08:11 by Bridgett Edmond M.D.    < end of copied text >      ASSESSMENT/PLAN: 	Patient is a 80 year old F with a PMHx of HTN, HLD, T2DM, HFpEF (EF 65%), breast cancer s/p R partial mastectomy, rectal carcinoma s/p resection, CAD s/p CABG (11/2020) who presents for 1 day of progressively worsening abdominal pain associated with nausea and NBNB vomiting.  Found to have acute rissa, s/p ERCP and stent and Perc rissa drain placed.  EP called for 2 episodes of tachycardia causing RRT.  Pt received Adenosine 6mg x 1 each time which slowed HR down.     -continue eliquis for lifelong anticoagulation for stroke prevention  -continue metoprolol 100mg BID.  so far, effective.  -If AT/AFib/AFL recurs will start amiodarone  -poor candidate for ablation at this time    Ld Lowery M.D.  Cardiac Electrophysiology  [phone]

## 2023-02-01 NOTE — PROGRESS NOTE ADULT - PROBLEM SELECTOR PLAN 2
- baseline 1.5 - 1.7  - single functioning kidney (right w/ known severe hydronephrosis)  - non-oliguric TAO multifactorial largely i/s/o relative hypotension  - likely ATN now    Plan:  - Cr now stable in mid 3s  - monitor on diuresis as below  - dose adjust medications for Cr

## 2023-02-01 NOTE — PROGRESS NOTE ADULT - ASSESSMENT
82 year old female with HTN, HLD, DM2, HFpEF, breast cancer s/p R partial mastectomy, rectal carcinoma s/p resection, CAD/ CABG presenting with abd pain, nausea and vomiting, found to have acute cholecystitis with choledocholithiasis. Underwent ERCP with biliary stent placement with repeat CT A/P with new fluid collection near the GB neck s/p perc rissa on 1/25 - culture with yeast. Leukocytosis overall better but remains elevated. Repeat CT with slight interval increase in GB neck fluid collection with drainage catheter in the RUQ adjacent to the GB and with new R perinephric fluid and loculated R RP fluid collection.     Recommend:  #Acute cholecystitis with choledocholithiasis/ abscess  -Continue zosyn, patient tolerating  -Continue fluconazole 200 mg po q 24 hours  -Monitor for fevers  -Repeat CT as above - IR evaluation for increase in size of GB fluid collection and for new R perinephric and loculated R RP fluid collections.    #Oral lesions, vesicular lesions  -Continue acyclovir    Dk Fonseca MD  Available through MS Teams  If no response, or after 5pm/weekends, call [phone]

## 2023-02-01 NOTE — PROGRESS NOTE ADULT - ASSESSMENT
ASSESSMENT:  Patient is a 80 year old F with a PMHx of HTN, HLD, T2DM, HFpEF (EF 65% X/2022), breast cancer s/p R partial mastectomy, rectal carcinoma s/p resection, CAD s/p CABG (11/2020) who presents for 1 day of progressively worsening abdominal pain associated with nausea and NBNB vomiting. 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  low nephron mass   non oliguric harleen likely in the view of relative  hypotension /hemodynamic --- cr still elevated   hyponatremia-- improve d  CVS----BP soft at times, stable at present    GI:  acute cholecystitis --- ERCP with stent placement  1/20/23  cholecystotomy tube placement  1/25/23 perc   -metabolic acidosis   -hyperphosphatemias     RECOMMENDATION:  1)Renal: cr gradually improving   non oliguric harleen  avoid nephrotoxic medication  dose all medications for gfr ~10-15 ml/min   cont  bumex 1 mg daily , monitor resp status   add bicarb 650 mg bid   daily bmp  renal diet low phos and potassium   2)CVS:  BP has been soft, stable at present, now on metoprolol 100 mg po bid (would add holding parameters)  3) ID: s/p caspofungin and on IV zosyn consider switching, abdominal fluid yeast growth seen ID on board.  4) GI on board        [facility]  Office: [phone]

## 2023-02-02 DIAGNOSIS — R31.9 HEMATURIA, UNSPECIFIED: ICD-10-CM

## 2023-02-02 DIAGNOSIS — N30.00 ACUTE CYSTITIS WITHOUT HEMATURIA: ICD-10-CM

## 2023-02-02 LAB
ALBUMIN SERPL ELPH-MCNC: 2.7 G/DL — LOW (ref 3.3–5)
ALP SERPL-CCNC: 122 U/L — HIGH (ref 40–120)
ALT FLD-CCNC: 12 U/L — SIGNIFICANT CHANGE UP (ref 10–45)
ANION GAP SERPL CALC-SCNC: 12 MMOL/L — SIGNIFICANT CHANGE UP (ref 5–17)
APPEARANCE UR: ABNORMAL
APPEARANCE UR: ABNORMAL
AST SERPL-CCNC: 19 U/L — SIGNIFICANT CHANGE UP (ref 10–40)
BACTERIA # UR AUTO: ABNORMAL
BACTERIA # UR AUTO: NEGATIVE — SIGNIFICANT CHANGE UP
BILIRUB SERPL-MCNC: 0.2 MG/DL — SIGNIFICANT CHANGE UP (ref 0.2–1.2)
BILIRUB UR-MCNC: ABNORMAL
BILIRUB UR-MCNC: NEGATIVE — SIGNIFICANT CHANGE UP
BUN SERPL-MCNC: 45 MG/DL — HIGH (ref 7–23)
CALCIUM SERPL-MCNC: 8.9 MG/DL — SIGNIFICANT CHANGE UP (ref 8.4–10.5)
CHLORIDE SERPL-SCNC: 105 MMOL/L — SIGNIFICANT CHANGE UP (ref 96–108)
CO2 SERPL-SCNC: 19 MMOL/L — LOW (ref 22–31)
COLOR SPEC: ABNORMAL
COLOR SPEC: SIGNIFICANT CHANGE UP
CREAT SERPL-MCNC: 2.87 MG/DL — HIGH (ref 0.5–1.3)
DIFF PNL FLD: ABNORMAL
DIFF PNL FLD: ABNORMAL
EGFR: 16 ML/MIN/1.73M2 — LOW
EPI CELLS # UR: 1 /HPF — SIGNIFICANT CHANGE UP
EPI CELLS # UR: 2 — SIGNIFICANT CHANGE UP
GLUCOSE BLDC GLUCOMTR-MCNC: 150 MG/DL — HIGH (ref 70–99)
GLUCOSE BLDC GLUCOMTR-MCNC: 276 MG/DL — HIGH (ref 70–99)
GLUCOSE BLDC GLUCOMTR-MCNC: 278 MG/DL — HIGH (ref 70–99)
GLUCOSE BLDC GLUCOMTR-MCNC: 281 MG/DL — HIGH (ref 70–99)
GLUCOSE BLDC GLUCOMTR-MCNC: 91 MG/DL — SIGNIFICANT CHANGE UP (ref 70–99)
GLUCOSE SERPL-MCNC: 101 MG/DL — HIGH (ref 70–99)
GLUCOSE UR QL: NEGATIVE — SIGNIFICANT CHANGE UP
GLUCOSE UR QL: NEGATIVE — SIGNIFICANT CHANGE UP
GRAN CASTS # UR COMP ASSIST: 3 /LPF — SIGNIFICANT CHANGE UP
HCT VFR BLD CALC: 29.7 % — LOW (ref 34.5–45)
HGB BLD-MCNC: 9.4 G/DL — LOW (ref 11.5–15.5)
HYALINE CASTS # UR AUTO: 0 /LPF — SIGNIFICANT CHANGE UP (ref 0–7)
KETONES UR-MCNC: NEGATIVE — SIGNIFICANT CHANGE UP
KETONES UR-MCNC: NEGATIVE — SIGNIFICANT CHANGE UP
LEUKOCYTE ESTERASE UR-ACNC: ABNORMAL
LEUKOCYTE ESTERASE UR-ACNC: NEGATIVE — SIGNIFICANT CHANGE UP
MAGNESIUM SERPL-MCNC: 1.9 MG/DL — SIGNIFICANT CHANGE UP (ref 1.6–2.6)
MCHC RBC-ENTMCNC: 29.7 PG — SIGNIFICANT CHANGE UP (ref 27–34)
MCHC RBC-ENTMCNC: 31.6 GM/DL — LOW (ref 32–36)
MCV RBC AUTO: 94 FL — SIGNIFICANT CHANGE UP (ref 80–100)
NITRITE UR-MCNC: NEGATIVE — SIGNIFICANT CHANGE UP
NITRITE UR-MCNC: POSITIVE
NRBC # BLD: 0 /100 WBCS — SIGNIFICANT CHANGE UP (ref 0–0)
PH UR: 6 — SIGNIFICANT CHANGE UP (ref 5–8)
PH UR: 6 — SIGNIFICANT CHANGE UP (ref 5–8)
PHOSPHATE SERPL-MCNC: 4.2 MG/DL — SIGNIFICANT CHANGE UP (ref 2.5–4.5)
PLATELET # BLD AUTO: 414 K/UL — HIGH (ref 150–400)
POTASSIUM SERPL-MCNC: 3.9 MMOL/L — SIGNIFICANT CHANGE UP (ref 3.5–5.3)
POTASSIUM SERPL-SCNC: 3.9 MMOL/L — SIGNIFICANT CHANGE UP (ref 3.5–5.3)
PROT SERPL-MCNC: 6.4 G/DL — SIGNIFICANT CHANGE UP (ref 6–8.3)
PROT UR-MCNC: ABNORMAL
PROT UR-MCNC: ABNORMAL
RBC # BLD: 3.16 M/UL — LOW (ref 3.8–5.2)
RBC # FLD: 16 % — HIGH (ref 10.3–14.5)
RBC CASTS # UR COMP ASSIST: 2534 /HPF — HIGH (ref 0–4)
RBC CASTS # UR COMP ASSIST: >50 /HPF — HIGH (ref 0–4)
SODIUM SERPL-SCNC: 136 MMOL/L — SIGNIFICANT CHANGE UP (ref 135–145)
SP GR SPEC: 1.01 — SIGNIFICANT CHANGE UP (ref 1.01–1.02)
SP GR SPEC: 1.01 — SIGNIFICANT CHANGE UP (ref 1.01–1.03)
UROBILINOGEN FLD QL: NEGATIVE — SIGNIFICANT CHANGE UP
UROBILINOGEN FLD QL: SIGNIFICANT CHANGE UP
WBC # BLD: 12.41 K/UL — HIGH (ref 3.8–10.5)
WBC # FLD AUTO: 12.41 K/UL — HIGH (ref 3.8–10.5)
WBC UR QL: 10 /HPF — HIGH (ref 0–5)
WBC UR QL: 5 /HPF — SIGNIFICANT CHANGE UP (ref 0–5)

## 2023-02-02 PROCEDURE — 99232 SBSQ HOSP IP/OBS MODERATE 35: CPT

## 2023-02-02 PROCEDURE — 99233 SBSQ HOSP IP/OBS HIGH 50: CPT | Mod: GC

## 2023-02-02 PROCEDURE — 99222 1ST HOSP IP/OBS MODERATE 55: CPT | Mod: FS

## 2023-02-02 RX ORDER — INSULIN LISPRO 100/ML
6 VIAL (ML) SUBCUTANEOUS
Refills: 0 | Status: DISCONTINUED | OUTPATIENT
Start: 2023-02-02 | End: 2023-02-02

## 2023-02-02 RX ORDER — INSULIN LISPRO 100/ML
7 VIAL (ML) SUBCUTANEOUS
Refills: 0 | Status: DISCONTINUED | OUTPATIENT
Start: 2023-02-02 | End: 2023-02-07

## 2023-02-02 RX ORDER — ACETAMINOPHEN 500 MG
650 TABLET ORAL EVERY 6 HOURS
Refills: 0 | Status: DISCONTINUED | OUTPATIENT
Start: 2023-02-02 | End: 2023-02-07

## 2023-02-02 RX ADMIN — ATORVASTATIN CALCIUM 40 MILLIGRAM(S): 80 TABLET, FILM COATED ORAL at 22:34

## 2023-02-02 RX ADMIN — Medication 650 MILLIGRAM(S): at 17:05

## 2023-02-02 RX ADMIN — BUMETANIDE 1 MILLIGRAM(S): 0.25 INJECTION INTRAMUSCULAR; INTRAVENOUS at 06:42

## 2023-02-02 RX ADMIN — LIDOCAINE 15 MILLILITER(S): 4 CREAM TOPICAL at 06:41

## 2023-02-02 RX ADMIN — Medication 100 MILLIGRAM(S): at 06:42

## 2023-02-02 RX ADMIN — Medication 6: at 18:18

## 2023-02-02 RX ADMIN — INSULIN GLARGINE 21 UNIT(S): 100 INJECTION, SOLUTION SUBCUTANEOUS at 22:33

## 2023-02-02 RX ADMIN — PIPERACILLIN AND TAZOBACTAM 25 GRAM(S): 4; .5 INJECTION, POWDER, LYOPHILIZED, FOR SOLUTION INTRAVENOUS at 17:34

## 2023-02-02 RX ADMIN — FLUCONAZOLE 200 MILLIGRAM(S): 150 TABLET ORAL at 17:05

## 2023-02-02 RX ADMIN — Medication 200 MILLIGRAM(S): at 13:34

## 2023-02-02 RX ADMIN — GABAPENTIN 200 MILLIGRAM(S): 400 CAPSULE ORAL at 13:34

## 2023-02-02 RX ADMIN — Medication 7 UNIT(S): at 09:08

## 2023-02-02 RX ADMIN — PIPERACILLIN AND TAZOBACTAM 25 GRAM(S): 4; .5 INJECTION, POWDER, LYOPHILIZED, FOR SOLUTION INTRAVENOUS at 06:43

## 2023-02-02 RX ADMIN — Medication 650 MILLIGRAM(S): at 08:27

## 2023-02-02 RX ADMIN — Medication 200 MILLIGRAM(S): at 22:34

## 2023-02-02 RX ADMIN — Medication 200 MILLIGRAM(S): at 06:42

## 2023-02-02 RX ADMIN — Medication 7 UNIT(S): at 18:44

## 2023-02-02 RX ADMIN — LIDOCAINE 15 MILLILITER(S): 4 CREAM TOPICAL at 17:05

## 2023-02-02 RX ADMIN — APIXABAN 2.5 MILLIGRAM(S): 2.5 TABLET, FILM COATED ORAL at 06:42

## 2023-02-02 RX ADMIN — LIDOCAINE 15 MILLILITER(S): 4 CREAM TOPICAL at 13:33

## 2023-02-02 RX ADMIN — Medication 100 MILLIGRAM(S): at 17:06

## 2023-02-02 RX ADMIN — Medication 2: at 22:34

## 2023-02-02 RX ADMIN — Medication 650 MILLIGRAM(S): at 07:27

## 2023-02-02 RX ADMIN — CHLORHEXIDINE GLUCONATE 1 APPLICATION(S): 213 SOLUTION TOPICAL at 09:09

## 2023-02-02 RX ADMIN — Medication 650 MILLIGRAM(S): at 06:41

## 2023-02-02 NOTE — PROVIDER CONTACT NOTE (OTHER) - NAME OF MD/NP/PA/DO NOTIFIED:
MD Dmitry Dangelo
MD Sean Pinto
Dr Velázquez
MD Marta Stoll
Sean Pinto MD
MD Nancy Velázquez
Michelet Bertrand MD

## 2023-02-02 NOTE — PROGRESS NOTE ADULT - SUBJECTIVE AND OBJECTIVE BOX
Luciano Partida PGY1  pager [phone] or check resident tab for coverage    Patient is a 82y old  Female who presents with a chief complaint of acute cholecystitis, DKA (01 Feb 2023 16:13)    SUBJECTIVE / OVERNIGHT EVENTS:    NAEO.  Patient this morning is,    Brief Daily Plan:    MEDICATIONS  MEDICATIONS  (STANDING):  acyclovir   Oral Tab/Cap 200 milliGRAM(s) Oral every 8 hours  apixaban 2.5 milliGRAM(s) Oral two times a day  aspirin  chewable 81 milliGRAM(s) Oral daily  atorvastatin 40 milliGRAM(s) Oral at bedtime  buMETAnide 1 milliGRAM(s) Oral daily  chlorhexidine 4% Liquid 1 Application(s) Topical <User Schedule>  dextrose 5%. 1000 milliLiter(s) (100 mL/Hr) IV Continuous <Continuous>  dextrose 5%. 1000 milliLiter(s) (50 mL/Hr) IV Continuous <Continuous>  dextrose 50% Injectable 25 Gram(s) IV Push once  dextrose 50% Injectable 12.5 Gram(s) IV Push once  dextrose 50% Injectable 25 Gram(s) IV Push once  fluconAZOLE   Tablet 200 milliGRAM(s) Oral every 24 hours  gabapentin 200 milliGRAM(s) Oral daily  glucagon  Injectable 1 milliGRAM(s) IntraMuscular once  insulin glargine Injectable (LANTUS) 21 Unit(s) SubCutaneous at bedtime  insulin lispro (ADMELOG) corrective regimen sliding scale   SubCutaneous at bedtime  insulin lispro (ADMELOG) corrective regimen sliding scale   SubCutaneous three times a day before meals  insulin lispro Injectable (ADMELOG) 7 Unit(s) SubCutaneous three times a day before meals  lidocaine 2% Viscous 15 milliLiter(s) Oral four times a day  metoprolol tartrate 100 milliGRAM(s) Oral every 12 hours  piperacillin/tazobactam IVPB.. 3.375 Gram(s) IV Intermittent every 12 hours  sodium bicarbonate 650 milliGRAM(s) Oral two times a day    MEDICATIONS  (PRN):  albuterol/ipratropium for Nebulization 3 milliLiter(s) Nebulizer every 6 hours PRN Shortness of Breath and/or Wheezing  aluminum hydroxide/magnesium hydroxide/simethicone Suspension 30 milliLiter(s) Oral every 6 hours PRN Dyspepsia  dextrose Oral Gel 15 Gram(s) Oral once PRN Blood Glucose LESS THAN 70 milliGRAM(s)/deciliter  sodium chloride 0.9% lock flush 10 milliLiter(s) IV Push every 1 hour PRN Pre/post blood products, medications, blood draw, and to maintain line patency      VITALS  Vital Signs Last 24 Hrs  T(C): 36.7 (02 Feb 2023 01:00), Max: 36.8 (01 Feb 2023 21:25)  T(F): 98.1 (02 Feb 2023 01:00), Max: 98.2 (01 Feb 2023 21:25)  HR: 72 (02 Feb 2023 01:00) (63 - 72)  BP: 121/65 (02 Feb 2023 01:00) (101/66 - 135/76)  BP(mean): --  RR: 18 (02 Feb 2023 01:00) (18 - 18)  SpO2: 98% (02 Feb 2023 01:00) (92% - 99%)    Parameters below as of 02 Feb 2023 01:00  Patient On (Oxygen Delivery Method): nasal cannula  O2 Flow (L/min): 1      PHYSICAL EXAM:  GENERAL: no distress  PSYCH: A&O x3  HEAD: Atraumatic, Normocephalic  NECK: Supple, No JVD  CHEST/LUNG: clear to auscultation bilaterally  HEART: regular rate and rhythm, no murmurs  ABDOMEN: nontender to palpation, no rebound tenderness/guarding  EXTREMITIES: no edema on bilateral LE  NEUROLOGY: no focal neurologic deficit  SKIN: No rashes or lesions    LABS:  All labs personally Reviewed.    RADIOLOGY & ADDITIONAL TESTS:  All imaging personally Reviewed.  Luciano Partida PGY1  pager [phone] or check resident tab for coverage    Patient is a 82y old  Female who presents with a chief complaint of acute cholecystitis, DKA (01 Feb 2023 16:13)    SUBJECTIVE / OVERNIGHT EVENTS:    NAEO.  Patient this morning is feeling mildly improved.   Oral pain and throat pain is resolving, able to tolerate PO.  Right abdominal pain is present and tender to palpation but does not want pain medication at this time, rates it 6/10 with palpation.  Shortness of breath is at baseline and has been persistent since the "surgery".   Has never had an episode of hematuria, has had UTIs in the past, notes her back has hurt but feels this is chronic, although nontender to palpation.     Brief Daily Plan:  ·	straight cath confirms hematuria, urology consulted  ·	f/u IR for perinephric fluid collection and retroperitoneal loculated collection  ·	appreciate all consulted service  ·	c/w zosyn, fluconazole, acyclovir  ·	pain management  ·	trend fever/WBC for sepsis    MEDICATIONS  MEDICATIONS  (STANDING):  acyclovir   Oral Tab/Cap 200 milliGRAM(s) Oral every 8 hours  apixaban 2.5 milliGRAM(s) Oral two times a day  aspirin  chewable 81 milliGRAM(s) Oral daily  atorvastatin 40 milliGRAM(s) Oral at bedtime  buMETAnide 1 milliGRAM(s) Oral daily  chlorhexidine 4% Liquid 1 Application(s) Topical <User Schedule>  dextrose 5%. 1000 milliLiter(s) (100 mL/Hr) IV Continuous <Continuous>  dextrose 5%. 1000 milliLiter(s) (50 mL/Hr) IV Continuous <Continuous>  dextrose 50% Injectable 25 Gram(s) IV Push once  dextrose 50% Injectable 12.5 Gram(s) IV Push once  dextrose 50% Injectable 25 Gram(s) IV Push once  fluconAZOLE   Tablet 200 milliGRAM(s) Oral every 24 hours  gabapentin 200 milliGRAM(s) Oral daily  glucagon  Injectable 1 milliGRAM(s) IntraMuscular once  insulin glargine Injectable (LANTUS) 21 Unit(s) SubCutaneous at bedtime  insulin lispro (ADMELOG) corrective regimen sliding scale   SubCutaneous at bedtime  insulin lispro (ADMELOG) corrective regimen sliding scale   SubCutaneous three times a day before meals  insulin lispro Injectable (ADMELOG) 7 Unit(s) SubCutaneous three times a day before meals  lidocaine 2% Viscous 15 milliLiter(s) Oral four times a day  metoprolol tartrate 100 milliGRAM(s) Oral every 12 hours  piperacillin/tazobactam IVPB.. 3.375 Gram(s) IV Intermittent every 12 hours  sodium bicarbonate 650 milliGRAM(s) Oral two times a day    MEDICATIONS  (PRN):  albuterol/ipratropium for Nebulization 3 milliLiter(s) Nebulizer every 6 hours PRN Shortness of Breath and/or Wheezing  aluminum hydroxide/magnesium hydroxide/simethicone Suspension 30 milliLiter(s) Oral every 6 hours PRN Dyspepsia  dextrose Oral Gel 15 Gram(s) Oral once PRN Blood Glucose LESS THAN 70 milliGRAM(s)/deciliter  sodium chloride 0.9% lock flush 10 milliLiter(s) IV Push every 1 hour PRN Pre/post blood products, medications, blood draw, and to maintain line patency      VITALS  Vital Signs Last 24 Hrs  T(C): 36.7 (02 Feb 2023 01:00), Max: 36.8 (01 Feb 2023 21:25)  T(F): 98.1 (02 Feb 2023 01:00), Max: 98.2 (01 Feb 2023 21:25)  HR: 72 (02 Feb 2023 01:00) (63 - 72)  BP: 121/65 (02 Feb 2023 01:00) (101/66 - 135/76)  BP(mean): --  RR: 18 (02 Feb 2023 01:00) (18 - 18)  SpO2: 98% (02 Feb 2023 01:00) (92% - 99%)    Parameters below as of 02 Feb 2023 01:00  Patient On (Oxygen Delivery Method): nasal cannula  O2 Flow (L/min): 1      PHYSICAL EXAM:  GENERAL: no distress  PSYCH: A&O x3  HEAD: Atraumatic, Normocephalic  NECK: Supple, No JVD  CHEST/LUNG: clear to auscultation bilaterally  HEART: regular rate and rhythm, no murmurs  ABDOMEN: right abdomen somewhat firm to touch, 6/10 tender to palpation, no rebound, minimal voluntary guarding  EXTREMITIES: trace edema to lower shins  NEUROLOGY: no focal neurologic deficit  SKIN: denuded skin noted in  area    LABS:  All labs personally Reviewed.    RADIOLOGY & ADDITIONAL TESTS:  All imaging personally Reviewed.

## 2023-02-02 NOTE — PROGRESS NOTE ADULT - ASSESSMENT
82 year old female with HTN, HLD, DM2, HFpEF, breast cancer s/p R partial mastectomy, rectal carcinoma s/p resection, CAD/ CABG presenting with abd pain, nausea and vomiting, found to have acute cholecystitis with choledocholithiasis. Underwent ERCP with biliary stent placement with repeat CT A/P with new fluid collection near the GB neck s/p perc rissa on 1/25 - culture with yeast. Leukocytosis overall better but remains elevated. Repeat CT with slight interval increase in GB neck fluid collection with drainage catheter in the RUQ adjacent to the GB and with new R perinephric fluid and loculated R RP fluid collection.     Recommend:  #Acute cholecystitis with choledocholithiasis/ abscess  -Continue zosyn, patient tolerating  -Continue fluconazole 200 mg po q 24 hours  -Monitor for fevers  -Repeat CT with results as above - IR evaluation for increase in size of GB fluid collection and for new R perinephric and loculated R RP fluid collections.  -Trend WBC - remains elevated    #Oral lesions, vesicular lesions  -Continue acyclovir    Dk Fonseca MD  Available through MS Teams  If no response, or after 5pm/weekends, call [phone]    Plan discussed with primary team.

## 2023-02-02 NOTE — CHART NOTE - NSCHARTNOTEFT_GEN_A_CORE
IR:  Assessment/Plan: This is a 82y Female s/p cholecystostomy drain on 1/25 in Interventional Radiology with Dr. Conway found to have dislodged rissa tube 1/31 evening; CT abdomen reviewed, rissa tube noted to be coiled in the peritoneum.     - mild increase in wbc, recommend repeat CT tonight or tomorrow to evaluate for need of cholecystostomy drain replacement  - imaging reviewed from 1/31, no role for perinephric or RP fluid collection  - please contact IR when imaging results  - d/w primary team

## 2023-02-02 NOTE — PROGRESS NOTE ADULT - SUBJECTIVE AND OBJECTIVE BOX
EP    Date of service 2/2  Uneventful overnight. Resting in bed. No arrhythmias noted.      acetaminophen     Tablet .. 650 milliGRAM(s) Oral every 6 hours PRN  acyclovir   Oral Tab/Cap 200 milliGRAM(s) Oral every 8 hours  albuterol/ipratropium for Nebulization 3 milliLiter(s) Nebulizer every 6 hours PRN  aluminum hydroxide/magnesium hydroxide/simethicone Suspension 30 milliLiter(s) Oral every 6 hours PRN  apixaban 2.5 milliGRAM(s) Oral two times a day  aspirin  chewable 81 milliGRAM(s) Oral daily  atorvastatin 40 milliGRAM(s) Oral at bedtime  buMETAnide 1 milliGRAM(s) Oral daily  chlorhexidine 4% Liquid 1 Application(s) Topical <User Schedule>  dextrose 5%. 1000 milliLiter(s) IV Continuous <Continuous>  dextrose 5%. 1000 milliLiter(s) IV Continuous <Continuous>  dextrose 50% Injectable 25 Gram(s) IV Push once  dextrose 50% Injectable 12.5 Gram(s) IV Push once  dextrose 50% Injectable 25 Gram(s) IV Push once  dextrose Oral Gel 15 Gram(s) Oral once PRN  fluconAZOLE   Tablet 200 milliGRAM(s) Oral every 24 hours  gabapentin 200 milliGRAM(s) Oral daily  glucagon  Injectable 1 milliGRAM(s) IntraMuscular once  insulin glargine Injectable (LANTUS) 21 Unit(s) SubCutaneous at bedtime  insulin lispro (ADMELOG) corrective regimen sliding scale   SubCutaneous at bedtime  insulin lispro (ADMELOG) corrective regimen sliding scale   SubCutaneous three times a day before meals  insulin lispro Injectable (ADMELOG) 7 Unit(s) SubCutaneous three times a day before meals  lidocaine 2% Viscous 15 milliLiter(s) Oral four times a day  metoprolol tartrate 100 milliGRAM(s) Oral every 12 hours  piperacillin/tazobactam IVPB.. 3.375 Gram(s) IV Intermittent every 12 hours  sodium bicarbonate 650 milliGRAM(s) Oral two times a day  sodium chloride 0.9% lock flush 10 milliLiter(s) IV Push every 1 hour PRN                            9.4    12.41 )-----------( 414      ( 02 Feb 2023 07:14 )             29.7       02-02    136  |  105  |  45<H>  ----------------------------<  101<H>  3.9   |  19<L>  |  2.87<H>    Ca    8.9      02 Feb 2023 07:14  Phos  4.2     02-02  Mg     1.9     02-02    TPro  6.4  /  Alb  2.7<L>  /  TBili  0.2  /  DBili  x   /  AST  19  /  ALT  12  /  AlkPhos  122<H>  02-02      T(C): 37.1 (02-02-23 @ 10:21), Max: 37.1 (02-02-23 @ 10:21)  HR: 67 (02-02-23 @ 10:21) (67 - 72)  BP: 103/68 (02-02-23 @ 10:21) (101/66 - 135/76)  RR: 18 (02-02-23 @ 10:21) (18 - 18)  SpO2: 100% (02-02-23 @ 10:21) (92% - 100%)  Wt(kg): --    I&O's Summary    01 Feb 2023 07:01  -  02 Feb 2023 07:00  --------------------------------------------------------  IN: 620 mL / OUT: 1550 mL / NET: -930 mL    02 Feb 2023 07:01  -  02 Feb 2023 11:13  --------------------------------------------------------  IN: 160 mL / OUT: 300 mL / NET: -140 mL    Gen: Appears fatigued but A&O x 3  HEENT:  (-)icterus (-)pallor  CV: N S1 S2 1/6 JAKY (+)2 Pulses B/l  Resp:  Clear to ausculatation B/L, normal effort  GI: (+) BS Soft, NT, ND  Lymph:  (-)Edema, (-)obvious lymphadenopathy  Skin: Warm to touch, Normal turgor  Psych: Appropriate mood and affect    TELEMETRY: 	SR    < from: TTE with Doppler (w/Cont) (09.09.22 @ 09:49) >  Conclusions:  1. Mild mitral annular calcification. Mitral annular  dilatation with normal leaflet opening. Mild mitral  regurgitation.  2. Aortic valve not well visualized; appears calcified.  Minimal aortic regurgitation.  3. Endocardial visualization enhanced with intravenous  injection of Ultrasonic Enhancing Agent (Definity). Left  ventricle suboptimally visualized despite intravenous  ultrasonic enhancing agent; the apex is foreshortened on  apical images. Overall normal left ventricular systolic  function.  4. Increased E/e' suggestive of elevated left ventricular  filling pressure.  5. The right ventricle is not well visualized; grossly  normal right ventricular size and systolic function.  *** Compared with echocardiogram of 1/11/2022, increased  E/e' suggestive of increased LV filling pressure is noted  on today's study.  ------------------------------------------------------------------------  Confirmed on  9/9/2022 - 12:08:11 by Bridgett Edmond M.D.    < end of copied text >      ASSESSMENT/PLAN: 	Patient is a 80 year old F with a PMHx of HTN, HLD, T2DM, HFpEF (EF 65%), breast cancer s/p R partial mastectomy, rectal carcinoma s/p resection, CAD s/p CABG (11/2020) who presents for 1 day of progressively worsening abdominal pain associated with nausea and NBNB vomiting.  Found to have acute rissa, s/p ERCP and stent and Perc rissa drain placed.  EP called for 2 episodes of tachycardia causing RRT.  Pt received Adenosine 6mg x 1 each time which slowed HR down.     -continue eliquis for lifelong anticoagulation for stroke prevention  -continue metoprolol 100mg BID.  so far, effective.  -If AT/AFib/AFL recurs will start amiodarone  -poor candidate for ablation at this time    Ld Lowery M.D.  Cardiac Electrophysiology  [phone]

## 2023-02-02 NOTE — PROGRESS NOTE ADULT - PROBLEM SELECTOR PLAN 6
Diet: CC  DVT PPx: c/w home eliquis  Dispo: likely [facility]IRO pending medical optimization - presented in mild DKA  - A1C 9.7  - c/w basal-bolus, target glucose 110 - 180

## 2023-02-02 NOTE — CONSULT NOTE ADULT - ASSESSMENT
82y Female with PMHx of HTN, HLD, DM2, HFpEF (EF 65%), CAD s/p CABG, Breast ca s/p R partial mastectomy rectal Ca s/p resection, recurrent SVT controlled on metoprolol, new onset HSV admitted for abdominal pain, diagnosed as acute cholecystitis. Noted to have budding yeast like cells on prior UA (1/21). Urology consulted for blood-tinged urine in purewick container. Purewick in place, suctioning well, of note pt is urine and stool incontinent.    - Straight catheterization performed at bedside to confirm hematuria   - UA and UCx from straight catheterization collection    - F/U out-pt for atrophic kidney.   - Continue abx and antifungals per ID   - To be discussed with attending  82y Female with PMHx of HTN, HLD, DM2, HFpEF (EF 65%), CAD s/p CABG, Breast ca s/p R partial mastectomy rectal Ca s/p resection, recurrent SVT controlled on metoprolol, new onset HSV admitted for abdominal pain, diagnosed as acute cholecystitis. Noted to have budding yeast like cells on prior UA (1/21). Urology consulted for blood-tinged urine in purewick container. Purewick in place, suctioning well, of note pt is urine and stool incontinent.    - Straight catheterization performed at bedside to confirm hematuria   - UA and UCx from straight catheterization collection    - F/U out-pt for atrophic kidney.   - Continue abx and antifungals per ID   - please check PVR  - To be discussed with attending  82y Female with PMHx of HTN, HLD, DM2, HFpEF (EF 65%), CAD s/p CABG, Breast ca s/p R partial mastectomy rectal Ca s/p resection, recurrent SVT controlled on metoprolol, new onset HSV admitted for abdominal pain, diagnosed as acute cholecystitis. Noted to have budding yeast like cells on prior UA (1/21). Urology consulted for blood-tinged urine in purewick container. Purewick in place, suctioning well, of note pt is urine and stool incontinent.    - Straight catheterization performed at bedside to confirm hematuria, urine appeared brown/maroon after straight cath.   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 Imaging reviewed with no hydronephrosis in non atrophic kidney, no indication for acute urological intervention.   - Continue abx and antifungals per ID   - Continue management of cholecystitis per primary team  - please check PVR  - Plan discussed with attending, Dr. Fili [facility]  [address] [place] [phone]

## 2023-02-02 NOTE — PROVIDER CONTACT NOTE (OTHER) - ASSESSMENT
Pt c/o pain at incisional site. Denies SOB, or chest pain.
Pt noted to have tele event, PATs for 4.231 seconds. Pt denies CP or SOB. VSS.
Pt incontinent of urine and stool, purewick in place. Urine in collection canister observed to be blood-tinged a red/pink color.
Pt is asymptomatic, denies chest pain, SOB, was asleep at the time of the event. VSS  (see flowsheet)
Pt A&Ox4. laying in bed comfortably. pt BP 91/57, rechecked BP and it was 95/54. Pt states she gets lightheaded if she sits up in bed or stands but that it passes.
Pt c/o of mild tongue swelling and pain. Pt denies CP or SOB. No use of accessory muscles or retractions observed. Pt VSS, tele monitoring and CPOX in place. Pt 02 saturation stable. No airway compromise observed.
pt is NPO and is a diabetic, she has a cough and a temp. pt has a diaretic medication that was held in the evening because pt's creatinine was elevated, should it be held in the am? pt has CHF Hx

## 2023-02-02 NOTE — PROGRESS NOTE ADULT - PROBLEM SELECTOR PLAN 5
- presented in mild DKA  - A1C 9.7  - c/w basal-bolus, target glucose 110 - 180 - patient with sustained episodes of SVT to 140s requiring adenosine  - unclear etiology  - appreciate EP recs  - stable, no further episodes on lopressor  - c/w lopressor 100mg q12h

## 2023-02-02 NOTE — CONSULT NOTE ADULT - PROVIDER SPECIALTY LIST ADULT
Nephrology
Urology
Gastroenterology
Electrophysiology
Intervent Radiology
Intervent Radiology
Cardiology
Infectious Disease
Surgery

## 2023-02-02 NOTE — PROVIDER CONTACT NOTE (OTHER) - BACKGROUND
Pt was admitted on 1/18/23 with 1 week of intermittent abdominal pain associated with N/V, pt found to have acute cholecystitis with cholelithiasis. Pt is s/p perc rissa drain placement on 1/25
see H & P
see H&P
Pt here for Cholecystitis
Pt admitted for cholecystitis s/p ERCP and cholecystostomy tube placement
Pt admitted on 1/18 with acute cholecystitis, s/p ERCP with stent placement and perc rissa.
Pt was admitted on 1/18/23 with 1 week of intermittent abdominal pain associated with N/V, pt found to have acute cholecystitis with cholelithiasis. Pt is s/p perc rissa drain placement on 1/25

## 2023-02-02 NOTE — PROGRESS NOTE ADULT - PROBLEM SELECTOR PLAN 4
- patient with sustained episodes of SVT to 140s requiring adenosine  - unclear etiology  - appreciate EP recs  - stable, no further episodes on lopressor  - c/w lopressor 100mg q12h - Hx of HFpEF  - recent admission 8/2022 for ADHF   - Echo w/ EF 60%, increased E/e' on 1/30/23  - c/w bumex 1 qD  - strict I&O, weights daily  - appreciate cardiology recs

## 2023-02-02 NOTE — PROVIDER CONTACT NOTE (OTHER) - SITUATION
tele event - 16 beats wide complex
tele event, PATs 4.231 seconds, HR up to 140
BP low
Low BP & Elevated Heart Rate. BP 85/59 & HR: 145
pt c/o of swelling of tongue/pain
change order for NPO except meds, change fingerstick to Q6, she has a cough, and a temp; when to give Bumex med
Blood-tinged urine

## 2023-02-02 NOTE — PROGRESS NOTE ADULT - SUBJECTIVE AND OBJECTIVE BOX
DATE OF SERVICE: 02-02-23 @ 12:51    Patient is a 82y old  Female who presents with a chief complaint of acute cholecystitis, DKA (02 Feb 2023 11:52)      INTERVAL HISTORY: Feels ok.     REVIEW OF SYSTEMS:  CONSTITUTIONAL: No weakness  EYES/ENT: No visual changes;  No throat pain   NECK: No pain or stiffness  RESPIRATORY: No cough, wheezing; No shortness of breath  CARDIOVASCULAR: No chest pain or palpitations  GASTROINTESTINAL: No abdominal  pain. No nausea, vomiting, or hematemesis  GENITOURINARY: No dysuria, frequency or hematuria  NEUROLOGICAL: No stroke like symptoms  SKIN: No rashes    TELEMETRY Personally reviewed: SR 60-70  	  MEDICATIONS:  buMETAnide 1 milliGRAM(s) Oral daily  metoprolol tartrate 100 milliGRAM(s) Oral every 12 hours        PHYSICAL EXAM:  T(C): 37.1 (02-02-23 @ 10:21), Max: 37.1 (02-02-23 @ 10:21)  HR: 67 (02-02-23 @ 10:21) (67 - 72)  BP: 103/68 (02-02-23 @ 10:21) (101/66 - 135/76)  RR: 18 (02-02-23 @ 10:21) (18 - 18)  SpO2: 100% (02-02-23 @ 10:21) (92% - 100%)  Wt(kg): --  I&O's Summary    01 Feb 2023 07:01  -  02 Feb 2023 07:00  --------------------------------------------------------  IN: 620 mL / OUT: 1550 mL / NET: -930 mL    02 Feb 2023 07:01  -  02 Feb 2023 12:51  --------------------------------------------------------  IN: 160 mL / OUT: 900 mL / NET: -740 mL          Appearance: In no distress	  HEENT:    PERRL, EOMI	  Cardiovascular:  S1 S2, No JVD  Respiratory: Lungs clear to auscultation	  Gastrointestinal:  Soft, Non-tender, + BS	  Vascularature:  No edema of LE  Psychiatric: Appropriate affect   Neuro: no acute focal deficits                               9.4    12.41 )-----------( 414      ( 02 Feb 2023 07:14 )             29.7     02-02    136  |  105  |  45<H>  ----------------------------<  101<H>  3.9   |  19<L>  |  2.87<H>    Ca    8.9      02 Feb 2023 07:14  Phos  4.2     02-02  Mg     1.9     02-02    TPro  6.4  /  Alb  2.7<L>  /  TBili  0.2  /  DBili  x   /  AST  19  /  ALT  12  /  AlkPhos  122<H>  02-02        Labs personally reviewed      ASSESSMENT/PLAN: 	      80 year old woman with CAD s/p CABG in 2020, prior DVT on Eliquis, HTN, and compensated diastolic CHF presents with acute cholecystitis.       #Post-operative risk evaluation-  S/p ERCP with CBD stent. Tolerate procedure well.   Ms. Batista displays no signs or symptoms of acute coronary ischemia or decompensated CHF.  Admission EKG is sinus with pre-existing anteroseptal q-waves.  Recent echo noted- normal LV systolic function with no hemodynamically significant valvular disease.    #Decompensated diastolic CHF-  Patient with severe pulm pressures on echo  c/w Bumex 1mg PO daily    #CAD s/p CABG-  No signs of active ischemic.  c/w ASA and statin  - 1/26 with c/o epigastric burning a/b episodes of emesis. ECG performed, no ischemic changes noted.   -1/28 c/o chest pain and epigastric pain similar to 2 days ago. Repeat EKG with no ischemic changes. Trop wnl.  This is most likely noncardiac in nature given patient's recent cholecystitis     #Sepsis   - 2/2 acute cholecystitis   - S/p CBD stent  - s/p IR drain 1/25  - Afebrile, WBC downtrending  - s/p ERCP 1/20 w/ stent  - perc rissa 1/25 - dislodged 2/1  - abdominal fluid cx w/ GPCs and candida  - c/w zosyn, fluconazole per ID  - plan for repeat ERCP w/ stent removal in 2 - 3 months  - follow fever/WBC curve, if c/f sepsis IR to replace dislodged rissa tube.    #SVT  - 1/26 patent HR in 130s while sleeping. Patient missed am dose of Metoprolol 150mg PO.  - Resume Metoprolol  - Adenosine 6mg IV x1 with termination of SVT  - cont to monitor on tele  - 1/27 with recurrent SVT s/p adenosine today   - EP consulted. Likely A-Tach.   - HR has been stable. Metoprolol decreased to 100mg PO BID.         Delaney Vasquez, FABIOLA-NP   Sergey Winchester DO [facility]  Cardiovascular Medicine  [address], [address]  Available through call or text on [employer]  Office: [phone]

## 2023-02-02 NOTE — PROVIDER CONTACT NOTE (OTHER) - REASON
change order for NPO except meds, change fingerstick to Q6, she has a cough, and a temp, when to give Bumex med
pt c/o of swelling of tongue/pain
Blood-tinged urine
tele event, PATs 4.231 seconds, HR up to 140
BP low
tele event - 16 beats wide complex
Low BP & Elevated Heart Rate

## 2023-02-02 NOTE — PROGRESS NOTE ADULT - ASSESSMENT
ASSESSMENT:  Patient is a 80 year old F with a PMHx of HTN, HLD, T2DM, HFpEF (EF 65% X/2022), breast cancer s/p R partial mastectomy, rectal carcinoma s/p resection, CAD s/p CABG (11/2020) who presents for 1 day of progressively worsening abdominal pain associated with nausea and NBNB vomiting. 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  low nephron mass   non oliguric harleen likely in the view of relative  hypotension /hemodynamic --- cr still elevated   hyponatremia-- improve d  CVS----BP soft at times, stable at present    GI:  acute cholecystitis --- ERCP with stent placement  1/20/23  cholecystotomy tube placement  1/25/23 perc , removal of perc 2/1/23   -metabolic acidosis   -hyperphosphatemias     RECOMMENDATION:  1)Renal: cr improving    non oliguric harleen  avoid nephrotoxic medication  dose all medications for gfr ~10-15 ml/min   cont  bumex 1 mg daily , monitor resp status   cont  bicarb 650 mg bid   daily bmp  renal diet   2)CVS: BP stable cont  metoprolol 100 mg po bid (would add holding parameters)  3) ID: on zosyn and fluconazole   4) GI on board      daughter updated at bedside   [facility]  Office: [phone]

## 2023-02-02 NOTE — CONSULT NOTE ADULT - CONSULT REQUESTED DATE/TIME
19-Jan-2023 11:23
18-Jan-2023 13:40
19-Jan-2023 16:26
27-Jan-2023 16:07
27-Jan-2023 11:52
02-Feb-2023 11:37
18-Jan-2023 15:34
18-Jan-2023 15:58
21-Jan-2023 09:09

## 2023-02-02 NOTE — PROGRESS NOTE ADULT - PROBLEM SELECTOR PLAN 3
- Hx of HFpEF  - recent admission 8/2022 for ADHF   - Echo w/ EF 60%, increased E/e' on 1/30/23  - c/w bumex 1 qD  - strict I&O, weights daily  - appreciate cardiology recs - baseline 1.5 - 1.7  - single functioning kidney (right w/ known severe hydronephrosis)  - non-oliguric TAO multifactorial largely i/s/o relative hypotension  - likely ATN now    Plan:  - Cr now downtrending  - monitor on diuresis as below  - dose adjust medications for Cr

## 2023-02-02 NOTE — CONSULT NOTE ADULT - SUBJECTIVE AND OBJECTIVE BOX
HPI:  82y Female with PMHx of HTN, HLD, DM2, HFpEF (EF 65%), CAD s/p CABG, Breast ca s/p R partial mastectomy rectal Ca s/p resection, recurrent SVT controlled on metoprolol, new onset HSV admitted for abdominal pain, diagnosed as acute cholecystitis. Pt evaluated by general surgery, was not a candidate for surgery due to comorbidities and being managed with conservative care. Pt's surgical course complicated by perforated gallbladder and dislodged cholecystostomy tube. During hospital stay, pt was stool and urine incontinence, purewick in place. Urology consulted for blood in purewick canister, concerned for hematuria. Pt is Romansh speaking, denies urinary symptoms, complaining mainly of gastric pain and diarrhea. Pt states that anytime she eats something, diarrhea occurs. Pt's daughter at bedside. States they went to Dr. Montenegro for kidney complications and atrophic R kidney, however due to complications, was not able to follow-up. Daughter also states pt has history of recurrent UTIs. Straight catheterization performed at bedside, dark brown urine collected.     PAST MEDICAL & SURGICAL HISTORY:  Breast CA, right   s/p mastectomy ,IV Chemotherapy    HTN (hypertension)    Lymphedema of arm  right arm s/p mastectomy    Hyperlipidemia    Rectal cancer  s/p chemotherapy and  radiation 12 weeks 2015 -3/2015    Obese    Type II diabetes mellitus    CHF (congestive heart failure)    DVT of leg (deep venous thrombosis)  right calf DVT  2019 pt on Eliquis    Renal insufficiency    S/P mastectomy, right      History of appendectomy      S/P ileostomy  low anterior resection-8/10/15, reversal of ileostomy     S/P colon resection  2015    S/P TKR (total knee replacement), right  2017          FAMILY HISTORY:  Family history of diabetes mellitus in mother    Family history of diabetes mellitus in son    Family history of heart attack (Child)    No known  malignancy     Social History:  Patient lives alone, and is fully independent in all IADLs. (2023 15:03)  Denies alcohol and drug abuse, nonsmoker     MEDICATIONS  (STANDING):  acyclovir   Oral Tab/Cap 200 milliGRAM(s) Oral every 8 hours  apixaban 2.5 milliGRAM(s) Oral two times a day  aspirin  chewable 81 milliGRAM(s) Oral daily  atorvastatin 40 milliGRAM(s) Oral at bedtime  buMETAnide 1 milliGRAM(s) Oral daily  chlorhexidine 4% Liquid 1 Application(s) Topical <User Schedule>  dextrose 5%. 1000 milliLiter(s) (100 mL/Hr) IV Continuous <Continuous>  dextrose 5%. 1000 milliLiter(s) (50 mL/Hr) IV Continuous <Continuous>  dextrose 50% Injectable 25 Gram(s) IV Push once  dextrose 50% Injectable 12.5 Gram(s) IV Push once  dextrose 50% Injectable 25 Gram(s) IV Push once  fluconAZOLE   Tablet 200 milliGRAM(s) Oral every 24 hours  gabapentin 200 milliGRAM(s) Oral daily  glucagon  Injectable 1 milliGRAM(s) IntraMuscular once  insulin glargine Injectable (LANTUS) 21 Unit(s) SubCutaneous at bedtime  insulin lispro (ADMELOG) corrective regimen sliding scale   SubCutaneous at bedtime  insulin lispro (ADMELOG) corrective regimen sliding scale   SubCutaneous three times a day before meals  insulin lispro Injectable (ADMELOG) 7 Unit(s) SubCutaneous three times a day before meals  lidocaine 2% Viscous 15 milliLiter(s) Oral four times a day  metoprolol tartrate 100 milliGRAM(s) Oral every 12 hours  piperacillin/tazobactam IVPB.. 3.375 Gram(s) IV Intermittent every 12 hours  sodium bicarbonate 650 milliGRAM(s) Oral two times a day    MEDICATIONS  (PRN):  acetaminophen     Tablet .. 650 milliGRAM(s) Oral every 6 hours PRN Mild Pain (1 - 3), Moderate Pain (4 - 6)  albuterol/ipratropium for Nebulization 3 milliLiter(s) Nebulizer every 6 hours PRN Shortness of Breath and/or Wheezing  aluminum hydroxide/magnesium hydroxide/simethicone Suspension 30 milliLiter(s) Oral every 6 hours PRN Dyspepsia  dextrose Oral Gel 15 Gram(s) Oral once PRN Blood Glucose LESS THAN 70 milliGRAM(s)/deciliter  sodium chloride 0.9% lock flush 10 milliLiter(s) IV Push every 1 hour PRN Pre/post blood products, medications, blood draw, and to maintain line patency    Allergies    CT scan dye (Hives)  penicillin (Unknown)    Intolerances      ROS: Pertinent positives and negatives as stated in HPI, otherwise negative    Vital Signs  T(C): 37.1 (23 @ 10:21), Max: 37.1 (23 @ 10:21)  HR: 67 (23 @ 10:21)  BP: 103/68 (23 @ 10:21)  SpO2: 100% (23 @ 10:21)  Wt(kg): --    Physical Exam  Gen: NAD  HEENT: normocephalic, atraumatic, no scleral icterus  Pulm: No respiratory distress, no subcostal retractions, no accessory muscle use   : Purewick in place, suctioning well, purewick container with dark fruit-punch colored "urine", unable to determine if also suctioning mixed stool, had bowel movement during exam, unable to examine thoroughly   NEURO: A&Ox3, no focal neurological deficits  SKIN: warm, dry, no rash.    LABS:         @ 07:14    WBC 12.41 / Hct 29.7  / SCr 2.87      @ 07:15    WBC 11.94 / Hct 33.3  / SCr 3.41         136  |  105  |  45<H>  ----------------------------<  101<H>  3.9   |  19<L>  |  2.87<H>    Ca    8.9      2023 07:14  Phos  4.2       Mg     1.9         TPro  6.4  /  Alb  2.7<L>  /  TBili  0.2  /  DBili  x   /  AST  19  /  ALT  12  /  AlkPhos  122<H>        Urinalysis Basic - ( 2023 01:11 )    Color: BROWN / Appearance: Slightly Turbid / S.012 / pH: x  Gluc: x / Ketone: Negative  / Bili: Negative / Urobili: Negative   Blood: x / Protein: 30 mg/dL / Nitrite: Negative   Leuk Esterase: Negative / RBC: 2534 /hpf / WBC 5 /HPF   Sq Epi: x / Non Sq Epi: 1 /hpf / Bacteria: Negative      Culture - Body Fluid with Gram Stain (23 @ 15:34)    -  Fluconazole: S 0.5 Fluconazole: Susceptibility depends on achieving the maximum possible blood level. Doses higher than the standard dosing amount (6mg/kg/day) may be needed in adults with normal renal function and body habitus.    This test was developed and itsperformance characteristics determined by [facility], [place], [place]  It has not been cleared or approved by the U.S. Food and Drug Administration. S - Susceptible; SDD - Susceptible Dose Dependent; I - Intermediate; R - Resistant; N/I - No Interpretation Available    Gram Stain:   No polymorphonuclear cells seen per low power field  Few Gram Positive Rods per oil power field    Specimen Source: Abdominal Fl Abdominal Fluid    Culture Results:   Rare Candida albicans  Please Note:************************************************  GPR seen in Gram stain is non-viable after prolonged  incubation and repeated subculture.    Organism Identification: Candida albicans    Organism: Candida albicans    Method Type: YSTMIC    Culture - Blood (23 @ 10:05)    Specimen Source: .Blood Blood-Peripheral    Culture Results:   No Growth Final    Radiology:  < from: CT Abdomen and Pelvis No Cont (23 @ 22:59) >  FINDINGS:  LOWER CHEST: Bilateral small pleural effusions, left greater than right,   with associated dependent atelectasis. Atherosclerosis within the aorta   and coronary arteries. LAD stent. Cardiomegaly. Right mastectomy.    LIVER: Within normal limits.  BILE DUCTS: Distal CBD stent is unchanged. No significant change in high   density material within the CBD adjacent to the stent, likely stones.  GALLBLADDER: Redemonstrated cholelithiasis. An ill-defined fluid   collection of 4.4 x 3.7 cm, previously measuring 4.6 x 3.0 cm, in the   gallbladder neck, with adjacent fat stranding and mass effect on the   duodenum. New percutaneous drainage catheter in the right upper quadrant   with tip terminating adjacent to the gallbladder.  SPLEEN: Within normal limits.  PANCREAS: Mildly atrophic pancreas.  ADRENALS: Within normal limits.  KIDNEYS/URETERS: Atrophied right kidney with marked chronic   hydronephrosis in UPJ pattern. Mild right perinephric fat stranding and   fluid density collections in the right retroperitoneum, measuring 4.2 x   3.0 cm (3-71), unchanged from 2023 but new since 2023. The left   kidney is within normal limits without hydronephrosis.    BLADDER: Within normal limits.  REPRODUCTIVE ORGANS: Fibroids.    BOWEL: Small hiatal hernia. No bowel obstruction. Appendix is not   visualized. No evidence of inflammation in the pericecal region.   Postsurgical changes in the small bowel and in the rectosigmoid.  PERITONEUM: No ascites.  VESSELS: Atherosclerotic changes.  RETROPERITONEUM/LYMPH NODES: No lymphadenopathy.  ABDOMINAL WALL: A small fat-containing umbilical hernia and additional   ventral abdominal wall hernias. Anasarca.  BONES: Right total hip arthroplasty. Osteoarthritis of the left hip   joint. Osteopenia and prior rib fractures.  Multiple small foci of sclerosis in the superior endplate of L3, likely   bone islands. Diffuse degenerative disease of the spine with disc   osteophyte complexes and posterior disc bulges at multiple   thoracic/lumbar levels without significant spinal canal stenosis.    IMPRESSION:  Slight interval increase in fluid collection adjacent to the gallbladder   neck. This can be secondary to gallbladder perforation, in the setting of   acute cholecystitis. Percutaneous drainage catheter coiled in the right   upper quadrant adjacent to the gallbladder. Distal CBD stent and adjacent   CBD stones, unchanged.  Marked chronic right hydronephrosis in a UPJ pattern. Adjacent   perinephric fluid and loculated right retroperitoneal fluid can be   secondary to a superimposed infection, new since 2023.    < end of copied text >

## 2023-02-02 NOTE — PROGRESS NOTE ADULT - SUBJECTIVE AND OBJECTIVE BOX
CC: Patient is a 82y old  Female who presents with a chief complaint of acute cholecystitis, DKA (2023 12:50)    ID following for acute cholecystitis    Interval History/ROS: Patient has no new complaints. No fevers, no chills.    Rest of ROS negative.    Allergies  CT scan dye (Hives)  penicillin (Unknown)    ANTIMICROBIALS:  acyclovir   Oral Tab/Cap 200 every 8 hours  fluconAZOLE   Tablet 200 every 24 hours  piperacillin/tazobactam IVPB.. 3.375 every 12 hours    OTHER MEDS:  acetaminophen     Tablet .. 650 milliGRAM(s) Oral every 6 hours PRN  albuterol/ipratropium for Nebulization 3 milliLiter(s) Nebulizer every 6 hours PRN  aluminum hydroxide/magnesium hydroxide/simethicone Suspension 30 milliLiter(s) Oral every 6 hours PRN  atorvastatin 40 milliGRAM(s) Oral at bedtime  buMETAnide 1 milliGRAM(s) Oral daily  chlorhexidine 4% Liquid 1 Application(s) Topical <User Schedule>  dextrose 5%. 1000 milliLiter(s) IV Continuous <Continuous>  dextrose 5%. 1000 milliLiter(s) IV Continuous <Continuous>  dextrose 50% Injectable 25 Gram(s) IV Push once  dextrose 50% Injectable 12.5 Gram(s) IV Push once  dextrose 50% Injectable 25 Gram(s) IV Push once  dextrose Oral Gel 15 Gram(s) Oral once PRN  gabapentin 200 milliGRAM(s) Oral daily  glucagon  Injectable 1 milliGRAM(s) IntraMuscular once  insulin glargine Injectable (LANTUS) 21 Unit(s) SubCutaneous at bedtime  insulin lispro (ADMELOG) corrective regimen sliding scale   SubCutaneous at bedtime  insulin lispro (ADMELOG) corrective regimen sliding scale   SubCutaneous three times a day before meals  insulin lispro Injectable (ADMELOG) 6 Unit(s) SubCutaneous three times a day before meals  lidocaine 2% Viscous 15 milliLiter(s) Oral four times a day  metoprolol tartrate 100 milliGRAM(s) Oral every 12 hours  sodium bicarbonate 650 milliGRAM(s) Oral two times a day  sodium chloride 0.9% lock flush 10 milliLiter(s) IV Push every 1 hour PRN    PE:    Vital Signs Last 24 Hrs  T(C): 36.9 (2023 16:54), Max: 37.1 (2023 10:21)  T(F): 98.4 (2023 16:54), Max: 98.7 (2023 10:21)  HR: 74 (2023 16:54) (67 - 74)  BP: 116/66 (2023 16:54) (103/68 - 135/76)  BP(mean): --  RR: 18 (2023 16:54) (18 - 18)  SpO2: 96% (2023 16:54) (92% - 100%)    Parameters below as of 2023 16:54  Patient On (Oxygen Delivery Method): nasal cannula  O2 Flow (L/min): 1    Gen: AOx3, NAD  CV: S1+S2 normal, no murmurs  Resp: Clear bilat, no resp distress  Abd: Soft, nontender, +BS, biliary drain in place  Ext: No LE edema, no wounds  : No Dowling  IV/Skin: No thrombophlebitis  Neuro: no focal deficits    LABS:                          9.4    12.41 )-----------( 414      ( 2023 07:14 )             29.7       02-02    136  |  105  |  45<H>  ----------------------------<  101<H>  3.9   |  19<L>  |  2.87<H>    Ca    8.9      2023 07:14  Phos  4.2     02-02  Mg     1.9     02-    TPro  6.4  /  Alb  2.7<L>  /  TBili  0.2  /  DBili  x   /  AST  19  /  ALT  12  /  AlkPhos  122<H>  02-02      Urinalysis Basic - ( 2023 11:42 )    Color: Brown / Appearance: Turbid / S.015 / pH: x  Gluc: x / Ketone: Negative  / Bili: Small / Urobili: <2 mg/dL   Blood: x / Protein: 300 mg/dL / Nitrite: Positive   Leuk Esterase: Trace / RBC: >50 /hpf / WBC 10 /HPF   Sq Epi: x / Non Sq Epi: 2 / Bacteria: Few        MICROBIOLOGY:  v  Abdominal Fl Abdominal Fluid  23   Rare Candida albicans  Please Note:************************************************  GPR seen in Gram stain is non-viable after prolonged  incubation and repeated subculture.  --  Candida albicans      .Blood Blood-Peripheral  23   No Growth Final  --  --      .Blood Blood-Peripheral  23   No Growth Final  --  --      .Blood Blood-Venous  23   No Growth Final  --  --    Rapid RVP Result: NotDetec ( @ 11:11)    RADIOLOGY:    < from: Xray Chest 1 View- PORTABLE-Urgent (Xray Chest 1 View- PORTABLE-Urgent .) (23 @ 09:58) >  IMPRESSION:    Slightly decreased pulmonary edema.    < end of copied text >

## 2023-02-02 NOTE — PROVIDER CONTACT NOTE (OTHER) - DATE AND TIME:
02-Feb-2023 00:18
30-Jan-2023 03:49
31-Jan-2023 05:24
23-Jan-2023 00:41
26-Jan-2023 00:49
28-Jan-2023 23:08
19-Jan-2023 16:41

## 2023-02-02 NOTE — PROGRESS NOTE ADULT - ASSESSMENT
82F history of HTN, HLD, DM2, HFpEF (EF 65%), CAD s/p CABG, Breast Ca s/p R partial mastectomy, Rectal Ca s/p resection presenting with abdominal pain x 1 week found to have acute cholecystitis, not a surgical candidate. S/p ERCP with stent placement course c/b gallbladder perforation s/p perc rissa w/ fluid c/f candida and GPCs on zosyn, fluconazole; perc rissa dislodged on 2/1/23. 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    82F history of HTN, HLD, DM2, HFpEF (EF 65%), CAD s/p CABG, Breast Ca s/p R partial mastectomy, Rectal Ca s/p resection presenting with abdominal pain x 1 week found to have acute cholecystitis, not a surgical candidate. S/p ERCP with stent placement course c/b gallbladder perforation s/p perc rissa w/ fluid c/f candida and GPCs on zosyn, fluconazole; perc rissa dislodged on 2/1/23. 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 New hematuria 2/2/23, urology consulted.

## 2023-02-02 NOTE — PROGRESS NOTE ADULT - ATTENDING COMMENTS
Acute cholecystitis s/p per rissa, on Zosyn.     Repeat CT 1/31- Slight interval increase in fluid collection adjacent to the gallbladder neck. This can be secondary to gallbladder perforation, in the setting of acute cholecystitis. Percutaneous drainage catheter coiled in the right upper quadrant adjacent to the gallbladder. Distal CBD stent and adjacent CBD stones, unchanged. Marked chronic right hydronephrosis in a UPJ pattern. Adjacent perinephric fluid and loculated right retroperitoneal fluid can be secondary to a superimposed infection, new since 1/18/2023.    Dislodged tube removed by IR yesterday. Plan for repeat CT tomorrow.

## 2023-02-02 NOTE — PROGRESS NOTE ADULT - SUBJECTIVE AND OBJECTIVE BOX
NEPHROLOGY     Patient seen and examined  no distress   SP perc removed yesterday     MEDICATIONS  (STANDING):  acetaminophen     Tablet .. 650 milliGRAM(s) Oral every 6 hours  acyclovir   Oral Tab/Cap 200 milliGRAM(s) Oral every 8 hours  apixaban 2.5 milliGRAM(s) Oral two times a day  aspirin  chewable 81 milliGRAM(s) Oral daily  atorvastatin 40 milliGRAM(s) Oral at bedtime  buMETAnide 1 milliGRAM(s) Oral daily  chlorhexidine 4% Liquid 1 Application(s) Topical <User Schedule>  dextrose 5%. 1000 milliLiter(s) (100 mL/Hr) IV Continuous <Continuous>  dextrose 5%. 1000 milliLiter(s) (50 mL/Hr) IV Continuous <Continuous>  dextrose 50% Injectable 25 Gram(s) IV Push once  dextrose 50% Injectable 12.5 Gram(s) IV Push once  dextrose 50% Injectable 25 Gram(s) IV Push once  fluconAZOLE   Tablet 200 milliGRAM(s) Oral every 24 hours  gabapentin 200 milliGRAM(s) Oral daily  glucagon  Injectable 1 milliGRAM(s) IntraMuscular once  insulin glargine Injectable (LANTUS) 19 Unit(s) SubCutaneous at bedtime  insulin lispro (ADMELOG) corrective regimen sliding scale   SubCutaneous three times a day before meals  insulin lispro (ADMELOG) corrective regimen sliding scale   SubCutaneous at bedtime  insulin lispro Injectable (ADMELOG) 6 Unit(s) SubCutaneous three times a day before meals  lidocaine 2% Viscous 15 milliLiter(s) Oral four times a day  metoprolol tartrate 100 milliGRAM(s) Oral every 12 hours  piperacillin/tazobactam IVPB.. 3.375 Gram(s) IV Intermittent every 12 hours  potassium chloride   Powder 20 milliEquivalent(s) Oral once    VITALS:  T(C): , Max: 36.6 (01-30-23 @ 17:16)  T(F): , Max: 97.9 (01-30-23 @ 17:16)  HR: 68 (01-31-23 @ 13:06)  BP: 128/78 (01-31-23 @ 13:06)  RR: 18 (01-31-23 @ 13:06)  SpO2: 100% (01-31-23 @ 13:06)    I and O's:    01-30 @ 07:01  -  01-31 @ 07:00  --------------------------------------------------------  IN: 820 mL / OUT: 2200 mL / NET: -1380 mL    01-31 @ 07:01  -  01-31 @ 13:49  --------------------------------------------------------  IN: 400 mL / OUT: 900 mL / NET: -500 mL    Weight (kg): 82.3 (01-30 @ 14:07)    PHYSICAL EXAM:  General: NAD; Alert  HEENT:  NCAT; PERRLA  Neck: No JVD; supple  Respiratory: CTA-b/l  Cardiac: RRR s1s2  Gastrointestinal: BS+, soft, NT/ND , biliary drain in placed   Urologic: No chen  Extremities: No peripheral edema, lymphedema at baseline     LABS:                        10.3   11.23 )-----------( 418      ( 31 Jan 2023 06:01 )             34.3     01-31    137  |  107  |  50<H>  ----------------------------<  196<H>  3.9   |  18<L>  |  3.45<H>    Ca    9.1      31 Jan 2023 06:01  Phos  4.6     01-31  Mg     2.1     01-31    TPro  6.6  /  Alb  2.9<L>  /  TBili  0.2  /  DBili  x   /  AST  13  /  ALT  10  /  AlkPhos  108  01-31

## 2023-02-02 NOTE — PROGRESS NOTE ADULT - PROBLEM SELECTOR PLAN 7
Diet: CC  DVT PPx: hold home eliquis in setting of hematuria  Dispo: likely [facility] pending medical optimization

## 2023-02-02 NOTE — PROGRESS NOTE ADULT - PROBLEM SELECTOR PLAN 2
- baseline 1.5 - 1.7  - single functioning kidney (right w/ known severe hydronephrosis)  - non-oliguric TAO multifactorial largely i/s/o relative hypotension  - likely ATN now    Plan:  - Cr now stable in mid 3s  - monitor on diuresis as below  - dose adjust medications for Cr - new gross hematuria 2/1 - 2/2  - straight cath UA confirms hematuria  - urine appears brown  - CT on 1/31 without evidence of kidney stones per discussion with radiologist  - appreciate urology recs

## 2023-02-02 NOTE — PROGRESS NOTE ADULT - PROBLEM SELECTOR PLAN 1
- presented with 1 week abdominal pain, nausea, vomiting  - initial CT A/P c/w cholecystitis  - per surgery, not a candidate given comorbidities  - s/p ERCP 1/20 w/ stent  - repeat CT A/P for recurrent abdominal pain c/f perforated gallbladder  - perc rissa 1/25 - dislodged 2/1  - abdominal fluid cx w/ GPCs and candida  - repeat CT A/P for recurrent abdominal pain with dislodged cholecystostomy tube and increased fluid collection    Plan:  - c/w zosyn, fluconazole per ID  - plan for repeat ERCP w/ stent removal in 2 - 3 months  - follow fever/WBC curve, if c/f sepsis IR to replace dislodged rissa tube - presented with 1 week abdominal pain, nausea, vomiting  - initial CT A/P c/w cholecystitis  - per surgery, not a candidate given comorbidities  - s/p ERCP 1/20 w/ stent  - repeat CT A/P for recurrent abdominal pain c/f perforated gallbladder  - perc rissa 1/25 - dislodged 2/1  - abdominal fluid cx w/ GPCs and candida  - repeat CT A/P for recurrent abdominal pain with dislodged cholecystostomy tube and increased fluid collection    Plan:  - c/w zosyn, fluconazole per ID  - plan for repeat ERCP w/ stent removal in 2 - 3 months  - follow fever/WBC curve, if c/f sepsis IR to replace dislodged rissa tube  - remains tender to palpation along right abdomen

## 2023-02-02 NOTE — PROVIDER CONTACT NOTE (OTHER) - RECOMMENDATIONS
Notify provider
Notify provider.
MD made aware
UA and CBC
MD Cruz notified and aware
MD Lozano notified and aware

## 2023-02-02 NOTE — CONSULT NOTE ADULT - REASON FOR ADMISSION
acute cholecystitis, DKA

## 2023-02-03 LAB
ALBUMIN SERPL ELPH-MCNC: 2.7 G/DL — LOW (ref 3.3–5)
ALP SERPL-CCNC: 128 U/L — HIGH (ref 40–120)
ALT FLD-CCNC: 16 U/L — SIGNIFICANT CHANGE UP (ref 10–45)
ANION GAP SERPL CALC-SCNC: 12 MMOL/L — SIGNIFICANT CHANGE UP (ref 5–17)
AST SERPL-CCNC: 25 U/L — SIGNIFICANT CHANGE UP (ref 10–40)
BILIRUB SERPL-MCNC: 0.2 MG/DL — SIGNIFICANT CHANGE UP (ref 0.2–1.2)
BUN SERPL-MCNC: 39 MG/DL — HIGH (ref 7–23)
CALCIUM SERPL-MCNC: 8.7 MG/DL — SIGNIFICANT CHANGE UP (ref 8.4–10.5)
CHLORIDE SERPL-SCNC: 107 MMOL/L — SIGNIFICANT CHANGE UP (ref 96–108)
CO2 SERPL-SCNC: 20 MMOL/L — LOW (ref 22–31)
CREAT SERPL-MCNC: 2.59 MG/DL — HIGH (ref 0.5–1.3)
CULTURE RESULTS: NO GROWTH — SIGNIFICANT CHANGE UP
EGFR: 18 ML/MIN/1.73M2 — LOW
GLUCOSE BLDC GLUCOMTR-MCNC: 149 MG/DL — HIGH (ref 70–99)
GLUCOSE BLDC GLUCOMTR-MCNC: 163 MG/DL — HIGH (ref 70–99)
GLUCOSE BLDC GLUCOMTR-MCNC: 246 MG/DL — HIGH (ref 70–99)
GLUCOSE BLDC GLUCOMTR-MCNC: 92 MG/DL — SIGNIFICANT CHANGE UP (ref 70–99)
GLUCOSE SERPL-MCNC: 196 MG/DL — HIGH (ref 70–99)
HCT VFR BLD CALC: 29 % — LOW (ref 34.5–45)
HGB BLD-MCNC: 9.1 G/DL — LOW (ref 11.5–15.5)
MAGNESIUM SERPL-MCNC: 1.8 MG/DL — SIGNIFICANT CHANGE UP (ref 1.6–2.6)
MCHC RBC-ENTMCNC: 30.2 PG — SIGNIFICANT CHANGE UP (ref 27–34)
MCHC RBC-ENTMCNC: 31.4 GM/DL — LOW (ref 32–36)
MCV RBC AUTO: 96.3 FL — SIGNIFICANT CHANGE UP (ref 80–100)
NRBC # BLD: 0 /100 WBCS — SIGNIFICANT CHANGE UP (ref 0–0)
PHOSPHATE SERPL-MCNC: 4.1 MG/DL — SIGNIFICANT CHANGE UP (ref 2.5–4.5)
PLATELET # BLD AUTO: 395 K/UL — SIGNIFICANT CHANGE UP (ref 150–400)
POTASSIUM SERPL-MCNC: 3.9 MMOL/L — SIGNIFICANT CHANGE UP (ref 3.5–5.3)
POTASSIUM SERPL-SCNC: 3.9 MMOL/L — SIGNIFICANT CHANGE UP (ref 3.5–5.3)
PROT SERPL-MCNC: 6.2 G/DL — SIGNIFICANT CHANGE UP (ref 6–8.3)
RBC # BLD: 3.01 M/UL — LOW (ref 3.8–5.2)
RBC # FLD: 16.3 % — HIGH (ref 10.3–14.5)
SODIUM SERPL-SCNC: 139 MMOL/L — SIGNIFICANT CHANGE UP (ref 135–145)
SPECIMEN SOURCE: SIGNIFICANT CHANGE UP
WBC # BLD: 11.56 K/UL — HIGH (ref 3.8–10.5)
WBC # FLD AUTO: 11.56 K/UL — HIGH (ref 3.8–10.5)

## 2023-02-03 PROCEDURE — 78226 HEPATOBILIARY SYSTEM IMAGING: CPT | Mod: 26

## 2023-02-03 PROCEDURE — 99232 SBSQ HOSP IP/OBS MODERATE 35: CPT

## 2023-02-03 PROCEDURE — 99233 SBSQ HOSP IP/OBS HIGH 50: CPT | Mod: GC

## 2023-02-03 PROCEDURE — 74176 CT ABD & PELVIS W/O CONTRAST: CPT | Mod: 26

## 2023-02-03 RX ORDER — INSULIN GLARGINE 100 [IU]/ML
10 INJECTION, SOLUTION SUBCUTANEOUS AT BEDTIME
Refills: 0 | Status: DISCONTINUED | OUTPATIENT
Start: 2023-02-03 | End: 2023-02-04

## 2023-02-03 RX ORDER — MAGNESIUM SULFATE 500 MG/ML
2 VIAL (ML) INJECTION ONCE
Refills: 0 | Status: COMPLETED | OUTPATIENT
Start: 2023-02-03 | End: 2023-02-03

## 2023-02-03 RX ORDER — ASPIRIN/CALCIUM CARB/MAGNESIUM 324 MG
81 TABLET ORAL DAILY
Refills: 0 | Status: DISCONTINUED | OUTPATIENT
Start: 2023-02-03 | End: 2023-02-07

## 2023-02-03 RX ADMIN — Medication 100 MILLIGRAM(S): at 06:02

## 2023-02-03 RX ADMIN — Medication 100 MILLIGRAM(S): at 17:01

## 2023-02-03 RX ADMIN — BUMETANIDE 1 MILLIGRAM(S): 0.25 INJECTION INTRAMUSCULAR; INTRAVENOUS at 06:01

## 2023-02-03 RX ADMIN — LIDOCAINE 15 MILLILITER(S): 4 CREAM TOPICAL at 11:13

## 2023-02-03 RX ADMIN — FLUCONAZOLE 200 MILLIGRAM(S): 150 TABLET ORAL at 17:02

## 2023-02-03 RX ADMIN — LIDOCAINE 15 MILLILITER(S): 4 CREAM TOPICAL at 06:02

## 2023-02-03 RX ADMIN — PIPERACILLIN AND TAZOBACTAM 25 GRAM(S): 4; .5 INJECTION, POWDER, LYOPHILIZED, FOR SOLUTION INTRAVENOUS at 06:03

## 2023-02-03 RX ADMIN — LIDOCAINE 15 MILLILITER(S): 4 CREAM TOPICAL at 17:01

## 2023-02-03 RX ADMIN — Medication 650 MILLIGRAM(S): at 17:01

## 2023-02-03 RX ADMIN — Medication 81 MILLIGRAM(S): at 11:13

## 2023-02-03 RX ADMIN — Medication 650 MILLIGRAM(S): at 06:02

## 2023-02-03 RX ADMIN — Medication 25 GRAM(S): at 11:12

## 2023-02-03 RX ADMIN — Medication 4: at 12:02

## 2023-02-03 RX ADMIN — GABAPENTIN 200 MILLIGRAM(S): 400 CAPSULE ORAL at 11:12

## 2023-02-03 RX ADMIN — Medication 200 MILLIGRAM(S): at 13:21

## 2023-02-03 RX ADMIN — Medication 200 MILLIGRAM(S): at 22:45

## 2023-02-03 RX ADMIN — Medication 200 MILLIGRAM(S): at 06:01

## 2023-02-03 RX ADMIN — Medication 7 UNIT(S): at 12:02

## 2023-02-03 RX ADMIN — PIPERACILLIN AND TAZOBACTAM 25 GRAM(S): 4; .5 INJECTION, POWDER, LYOPHILIZED, FOR SOLUTION INTRAVENOUS at 17:02

## 2023-02-03 RX ADMIN — Medication 2: at 09:03

## 2023-02-03 RX ADMIN — INSULIN GLARGINE 10 UNIT(S): 100 INJECTION, SOLUTION SUBCUTANEOUS at 23:15

## 2023-02-03 RX ADMIN — CHLORHEXIDINE GLUCONATE 1 APPLICATION(S): 213 SOLUTION TOPICAL at 09:05

## 2023-02-03 RX ADMIN — Medication 7 UNIT(S): at 09:02

## 2023-02-03 RX ADMIN — ATORVASTATIN CALCIUM 40 MILLIGRAM(S): 80 TABLET, FILM COATED ORAL at 22:45

## 2023-02-03 NOTE — PROGRESS NOTE ADULT - SUBJECTIVE AND OBJECTIVE BOX
EP    Date of service 2/3  Uneventful overnight. Resting in bed. No arrhythmias noted.      acetaminophen     Tablet .. 650 milliGRAM(s) Oral every 6 hours PRN  acyclovir   Oral Tab/Cap 200 milliGRAM(s) Oral every 8 hours  albuterol/ipratropium for Nebulization 3 milliLiter(s) Nebulizer every 6 hours PRN  aluminum hydroxide/magnesium hydroxide/simethicone Suspension 30 milliLiter(s) Oral every 6 hours PRN  atorvastatin 40 milliGRAM(s) Oral at bedtime  buMETAnide 1 milliGRAM(s) Oral daily  chlorhexidine 4% Liquid 1 Application(s) Topical <User Schedule>  dextrose 5%. 1000 milliLiter(s) IV Continuous <Continuous>  dextrose 5%. 1000 milliLiter(s) IV Continuous <Continuous>  dextrose 50% Injectable 25 Gram(s) IV Push once  dextrose 50% Injectable 12.5 Gram(s) IV Push once  dextrose 50% Injectable 25 Gram(s) IV Push once  dextrose Oral Gel 15 Gram(s) Oral once PRN  fluconAZOLE   Tablet 200 milliGRAM(s) Oral every 24 hours  gabapentin 200 milliGRAM(s) Oral daily  glucagon  Injectable 1 milliGRAM(s) IntraMuscular once  insulin glargine Injectable (LANTUS) 21 Unit(s) SubCutaneous at bedtime  insulin lispro (ADMELOG) corrective regimen sliding scale   SubCutaneous at bedtime  insulin lispro (ADMELOG) corrective regimen sliding scale   SubCutaneous three times a day before meals  insulin lispro Injectable (ADMELOG) 7 Unit(s) SubCutaneous three times a day before meals  lidocaine 2% Viscous 15 milliLiter(s) Oral four times a day  magnesium sulfate  IVPB 2 Gram(s) IV Intermittent once  metoprolol tartrate 100 milliGRAM(s) Oral every 12 hours  piperacillin/tazobactam IVPB.. 3.375 Gram(s) IV Intermittent every 12 hours  sodium bicarbonate 650 milliGRAM(s) Oral two times a day  sodium chloride 0.9% lock flush 10 milliLiter(s) IV Push every 1 hour PRN                        9.1    11.56 )-----------( 395      ( 03 Feb 2023 09:05 )             29.0     02-03    139  |  107  |  39<H>  ----------------------------<  196<H>  3.9   |  20<L>  |  2.59<H>    Ca    8.7      03 Feb 2023 09:03  Phos  4.1     02-03  Mg     1.8     02-03    TPro  6.2  /  Alb  2.7<L>  /  TBili  0.2  /  DBili  x   /  AST  25  /  ALT  16  /  AlkPhos  128<H>  02-03    T(C): 36.5 (02-03-23 @ 05:45), Max: 36.9 (02-02-23 @ 16:54)  HR: 70 (02-03-23 @ 05:45) (69 - 74)  BP: 121/55 (02-03-23 @ 05:45) (111/77 - 121/67)  RR: 18 (02-03-23 @ 05:45) (18 - 18)  SpO2: 98% (02-03-23 @ 05:45) (96% - 100%)  Wt(kg): --    I&O's Summary    02 Feb 2023 07:01  -  03 Feb 2023 07:00  --------------------------------------------------------  IN: 1100 mL / OUT: 1925 mL / NET: -825 mL    03 Feb 2023 07:01  -  03 Feb 2023 10:21  --------------------------------------------------------  IN: 0 mL / OUT: 300 mL / NET: -300 mL    Gen: Appears fatigued but A&O x 3  HEENT:  (-)icterus (-)pallor  CV: N S1 S2 1/6 JAKY (+)2 Pulses B/l  Resp:  Clear to ausculatation B/L, normal effort  GI: (+) BS Soft, NT, ND  Lymph:  (-)Edema, (-)obvious lymphadenopathy  Skin: Warm to touch, Normal turgor  Psych: Appropriate mood and affect    TELEMETRY: 	SR    < from: TTE with Doppler (w/Cont) (09.09.22 @ 09:49) >  Conclusions:  1. Mild mitral annular calcification. Mitral annular  dilatation with normal leaflet opening. Mild mitral  regurgitation.  2. Aortic valve not well visualized; appears calcified.  Minimal aortic regurgitation.  3. Endocardial visualization enhanced with intravenous  injection of Ultrasonic Enhancing Agent (Definity). Left  ventricle suboptimally visualized despite intravenous  ultrasonic enhancing agent; the apex is foreshortened on  apical images. Overall normal left ventricular systolic  function.  4. Increased E/e' suggestive of elevated left ventricular  filling pressure.  5. The right ventricle is not well visualized; grossly  normal right ventricular size and systolic function.  *** Compared with echocardiogram of 1/11/2022, increased  E/e' suggestive of increased LV filling pressure is noted  on today's study.  ------------------------------------------------------------------------  Confirmed on  9/9/2022 - 12:08:11 by Bridgett Edmond M.D.    < end of copied text >      ASSESSMENT/PLAN: 	Patient is a 80 year old F with a PMHx of HTN, HLD, T2DM, HFpEF (EF 65%), breast cancer s/p R partial mastectomy, rectal carcinoma s/p resection, CAD s/p CABG (11/2020) who presents for 1 day of progressively worsening abdominal pain associated with nausea and NBNB vomiting.  Found to have acute rissa, s/p ERCP and stent and Perc rissa drain placed.  EP called for 2 episodes of tachycardia causing RRT.  Pt received Adenosine 6mg x 1 each time which slowed HR down.     -continue eliquis for lifelong anticoagulation for stroke prevention  -continue metoprolol 100mg BID.  so far, effective.  -If AT/AFib/AFL recurs will start amiodarone  -poor candidate for ablation at this time    Ld Lowery M.D.  Cardiac Electrophysiology  [phone]

## 2023-02-03 NOTE — PROGRESS NOTE ADULT - PROBLEM SELECTOR PLAN 4
- Hx of HFpEF  - recent admission 8/2022 for ADHF   - Echo w/ EF 60%, increased E/e' on 1/30/23  - c/w bumex 1 qD  - strict I&O, weights daily  - appreciate cardiology recs

## 2023-02-03 NOTE — PROGRESS NOTE ADULT - PROBLEM SELECTOR PLAN 7
Diet: CC  DVT PPx: hold home eliquis in setting of hematuria  Dispo: likely [facility] pending medical optimization Diet: CC  DVT PPx: hold home eliquis for possible procedure per IR  Dispo: likely [facility] pending medical optimization

## 2023-02-03 NOTE — PROGRESS NOTE ADULT - SUBJECTIVE AND OBJECTIVE BOX
DATE OF SERVICE: 02-03-23 @ 22:49    Patient is a 82y old  Female who presents with a chief complaint of acute cholecystitis, DKA (03 Feb 2023 18:25)      INTERVAL HISTORY: feels ok     MEDICATIONS:  buMETAnide 1 milliGRAM(s) Oral daily  metoprolol tartrate 100 milliGRAM(s) Oral every 12 hours        PHYSICAL EXAM:  T(C): 37.1 (02-03-23 @ 21:55), Max: 37.1 (02-03-23 @ 21:55)  HR: 68 (02-03-23 @ 21:55) (64 - 70)  BP: 141/84 (02-03-23 @ 21:55) (121/55 - 141/84)  RR: 18 (02-03-23 @ 21:55) (18 - 18)  SpO2: 97% (02-03-23 @ 21:55) (97% - 100%)  Wt(kg): --  I&O's Summary    02 Feb 2023 07:01  -  03 Feb 2023 07:00  --------------------------------------------------------  IN: 1100 mL / OUT: 1925 mL / NET: -825 mL    03 Feb 2023 07:01  -  03 Feb 2023 22:49  --------------------------------------------------------  IN: 440 mL / OUT: 900 mL / NET: -460 mL          Appearance: In no distress	  HEENT:    PERRL, EOMI	  Cardiovascular:  S1 S2, No JVD  Respiratory: Lungs clear to auscultation	  Gastrointestinal:  Soft, Non-tender, + BS	  Vascularature:  No edema of LE  Psychiatric: Appropriate affect   Neuro: no acute focal deficits                               9.1    11.56 )-----------( 395      ( 03 Feb 2023 09:05 )             29.0     02-03    139  |  107  |  39<H>  ----------------------------<  196<H>  3.9   |  20<L>  |  2.59<H>    Ca    8.7      03 Feb 2023 09:03  Phos  4.1     02-03  Mg     1.8     02-03    TPro  6.2  /  Alb  2.7<L>  /  TBili  0.2  /  DBili  x   /  AST  25  /  ALT  16  /  AlkPhos  128<H>  02-03        Labs personally reviewed      ASSESSMENT/PLAN: 	      80 year old woman with CAD s/p CABG in 2020, prior DVT on Eliquis, HTN, and compensated diastolic CHF presents with acute cholecystitis.       #Post-operative risk evaluation-  S/p ERCP with CBD stent. Tolerate procedure well.   Ms. Batista displays no signs or symptoms of acute coronary ischemia or decompensated CHF.  Admission EKG is sinus with pre-existing anteroseptal q-waves.  Recent echo noted- normal LV systolic function with no hemodynamically significant valvular disease.    #Decompensated diastolic CHF-  Patient with severe pulm pressures on echo  c/w Bumex 1mg PO daily    #CAD s/p CABG-  No signs of active ischemic.  c/w ASA and statin  - 1/26 with c/o epigastric burning a/b episodes of emesis. ECG performed, no ischemic changes noted.   -1/28 c/o chest pain and epigastric pain similar to 2 days ago. Repeat EKG with no ischemic changes. Trop wnl.  This is most likely noncardiac in nature given patient's recent cholecystitis     #Sepsis   - 2/2 acute cholecystitis   - S/p CBD stent  - s/p IR drain 1/25  - Afebrile, WBC downtrending  - s/p ERCP 1/20 w/ stent  - perc rissa 1/25 - dislodged 2/1  - abdominal fluid cx w/ GPCs and candida  - c/w zosyn, fluconazole per ID  - plan for repeat ERCP w/ stent removal in 2 - 3 months  - follow fever/WBC curve, if c/f sepsis IR to replace dislodged rissa tube.    #SVT  - 1/26 patent HR in 130s while sleeping. Patient missed am dose of Metoprolol 150mg PO.  - Resume Metoprolol  - Adenosine 6mg IV x1 with termination of SVT  - cont to monitor on tele  - 1/27 with recurrent SVT s/p adenosine today   - EP consulted. Likely A-Tach.   - HR has been stable. Metoprolol decreased to 100mg PO BID.         Sergey Winchester DO [facility]  Cardiovascular Medicine  [address] [place] [address], [address]  Office: [phone]  Available via Text/call on [employer] Teams

## 2023-02-03 NOTE — PROGRESS NOTE ADULT - PROBLEM SELECTOR PLAN 2
- new gross hematuria 2/1 - 2/2  - straight cath UA confirms hematuria  - urine appears brown  - CT on 1/31 without evidence of kidney stones per discussion with radiologist  - appreciate urology recs

## 2023-02-03 NOTE — PROGRESS NOTE ADULT - ASSESSMENT
82y Female with PMHx of HTN, HLD, DM2, HFpEF (EF 65%), CAD s/p CABG, Breast ca s/p R partial mastectomy rectal Ca s/p resection, recurrent SVT controlled on metoprolol, new onset HSV admitted for abdominal pain, diagnosed as acute cholecystitis. Noted to have budding yeast like cells on prior UA (1/21). Urology consulted for blood-tinged urine in purewick container. Purewick in place, suctioning well, of note pt is urine and stool incontinent.  Straight catheterization performed at bedside 2/2 to confirm hematuria, urine appeared brown/maroon after straight cath.     Patient seen and examined by Dr. 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 Imaging reviewed with no hydronephrosis in non atrophic kidney, no indication for acute urological intervention.   - Continue abx and antifungals per ID   - Continue management of cholecystitis per primary team  - please check PVR  - Plan discussed with attending, Dr. Penn    [facility]  [address] [place] [address]  [phone]

## 2023-02-03 NOTE — PROGRESS NOTE ADULT - SUBJECTIVE AND OBJECTIVE BOX
Subjective    Patient seen and examined. States she feels okay.   Per RN patient voiding but has not had a chance to bladder scan.     Objective    Vital signs  T(F): , Max: 98.7 (02-02-23 @ 10:21)  HR: 70 (02-03-23 @ 05:45)  BP: 121/55 (02-03-23 @ 05:45)  SpO2: 98% (02-03-23 @ 05:45)  Wt(kg): --    Output     02-02 @ 07:01  -  02-03 @ 07:00  --------------------------------------------------------  IN: 800 mL / OUT: 1925 mL / NET: -1125 mL        General: NAD  Abdomen: soft/non-tender/non-distended  : voiding    Labs      02-02 @ 07:14    WBC 12.41 / Hct 29.7  / SCr 2.87

## 2023-02-03 NOTE — PROGRESS NOTE ADULT - SUBJECTIVE AND OBJECTIVE BOX
CC: Patient is a 82y old  Female who presents with a chief complaint of acute cholecystitis, DKA (2023 13:34)    ID following acute cholecystitis    Interval History/ROS: Patient states oral pain has resolved, but remains with R sided abd pain. No fevers, no chills.    Rest of ROS negative.    Allergies  CT scan dye (Hives)  penicillin (Unknown)    ANTIMICROBIALS:  acyclovir   Oral Tab/Cap 200 every 8 hours  fluconAZOLE   Tablet 200 every 24 hours  piperacillin/tazobactam IVPB.. 3.375 every 12 hours    OTHER MEDS:  acetaminophen     Tablet .. 650 milliGRAM(s) Oral every 6 hours PRN  albuterol/ipratropium for Nebulization 3 milliLiter(s) Nebulizer every 6 hours PRN  aluminum hydroxide/magnesium hydroxide/simethicone Suspension 30 milliLiter(s) Oral every 6 hours PRN  aspirin  chewable 81 milliGRAM(s) Oral daily  atorvastatin 40 milliGRAM(s) Oral at bedtime  buMETAnide 1 milliGRAM(s) Oral daily  chlorhexidine 4% Liquid 1 Application(s) Topical <User Schedule>  dextrose 5%. 1000 milliLiter(s) IV Continuous <Continuous>  dextrose 5%. 1000 milliLiter(s) IV Continuous <Continuous>  dextrose 50% Injectable 25 Gram(s) IV Push once  dextrose 50% Injectable 12.5 Gram(s) IV Push once  dextrose 50% Injectable 25 Gram(s) IV Push once  dextrose Oral Gel 15 Gram(s) Oral once PRN  gabapentin 200 milliGRAM(s) Oral daily  glucagon  Injectable 1 milliGRAM(s) IntraMuscular once  insulin glargine Injectable (LANTUS) 21 Unit(s) SubCutaneous at bedtime  insulin lispro (ADMELOG) corrective regimen sliding scale   SubCutaneous at bedtime  insulin lispro (ADMELOG) corrective regimen sliding scale   SubCutaneous three times a day before meals  insulin lispro Injectable (ADMELOG) 7 Unit(s) SubCutaneous three times a day before meals  lidocaine 2% Viscous 15 milliLiter(s) Oral four times a day  metoprolol tartrate 100 milliGRAM(s) Oral every 12 hours  sodium bicarbonate 650 milliGRAM(s) Oral two times a day  sodium chloride 0.9% lock flush 10 milliLiter(s) IV Push every 1 hour PRN    PE:    Vital Signs Last 24 Hrs  T(C): 36.8 (2023 13:58), Max: 36.8 (2023 13:58)  T(F): 98.2 (2023 13:58), Max: 98.2 (2023 13:58)  HR: 64 (2023 13:58) (64 - 70)  BP: 122/56 (2023 13:58) (111/77 - 130/80)  BP(mean): --  RR: 18 (2023 13:58) (18 - 18)  SpO2: 100% (2023 13:58) (96% - 100%)    Parameters below as of 2023 13:58  Patient On (Oxygen Delivery Method): nasal cannula  O2 Flow (L/min): 1    Gen: AOx3, NAD  CV: S1+S2 normal, no murmurs  Resp: Clear bilat, no resp distress  Abd: Soft, nontender, +BS, biliary drain removed  Ext: No LE edema, no wounds  : No Dowling  IV/Skin: No thrombophlebitis  Neuro: no focal deficits    LABS:                          9.1    11.56 )-----------( 395      ( 2023 09:05 )             29.0       02-03    139  |  107  |  39<H>  ----------------------------<  196<H>  3.9   |  20<L>  |  2.59<H>    Ca    8.7      2023 09:03  Phos  4.1     02-03  Mg     1.8     02-03    TPro  6.2  /  Alb  2.7<L>  /  TBili  0.2  /  DBili  x   /  AST  25  /  ALT  16  /  AlkPhos  128<H>  02-03      Urinalysis Basic - ( 2023 11:42 )    Color: Brown / Appearance: Turbid / S.015 / pH: x  Gluc: x / Ketone: Negative  / Bili: Small / Urobili: <2 mg/dL   Blood: x / Protein: 300 mg/dL / Nitrite: Positive   Leuk Esterase: Trace / RBC: >50 /hpf / WBC 10 /HPF   Sq Epi: x / Non Sq Epi: 2 / Bacteria: Few        MICROBIOLOGY:  v  Catheterized Catheterized  23   No growth  --  --      Abdominal Fl Abdominal Fluid  23   Rare Candida albicans  Please Note:************************************************  GPR seen in Gram stain is non-viable after prolonged  incubation and repeated subculture.  --  Candida albicans      .Blood Blood-Peripheral  23   No Growth Final  --  --      .Blood Blood-Peripheral  23   No Growth Final  --  --      .Blood Blood-Venous  23   No Growth Final  --  --    Rapid RVP Result: NotDetec ( @ 11:11)    RADIOLOGY:    < from: CT Abdomen and Pelvis No Cont (23 @ 10:10) >  IMPRESSION:    Cholelithiasis with multiple small calcified gallstones within the low   inserting cystic duct. Persistent wall thickening of the gallbladder   neck. Persistent pericholecystic inflammation surrounding gallbladder   neck and cystic duct. Mild decrease in size of the pericholecystic   probable biloma as described    < end of copied text >

## 2023-02-03 NOTE — PROGRESS NOTE ADULT - PROBLEM SELECTOR PLAN 5
- patient with sustained episodes of SVT to 140s requiring adenosine  - unclear etiology  - appreciate EP recs  - stable, no further episodes on lopressor  - c/w lopressor 100mg q12h

## 2023-02-03 NOTE — CHART NOTE - NSCHARTNOTEFT_GEN_A_CORE
Interventional Radiology    82y Female s/p cholecystostomy drain on 1/25 in Interventional Radiology with Dr. Conway found to have dislodged rissa tube 1/31 evening; CT abdomen reviewed, rissa tube noted to be coiled in the peritoneum and was subsequently removed at bedside on 2/1.   IR called for persistent abdominal pain.     - CT imaging on 2/3 reviewed with Dr. Martinez. Similar appearance of nondistended gallbladder withwall thickening and pericholecystic inflammation.  - patient remains afebrile, mild leukocytosis downtrending. Persistent diffuse abdominal pain. Due to removal of rissa tube soon after insertion, potential source of pain could be bile peritonitis. Recommend surgery consult to evaluate.  - Recommend HIDA scan to evaluate need for reinsertion of cholecystostomy   - please contact IR once HIDA is done  - d/w primary team    --  Please call IR extension [phone] with any questions, concerns or issues regarding above.

## 2023-02-03 NOTE — PROGRESS NOTE ADULT - PROBLEM SELECTOR PLAN 3
- baseline 1.5 - 1.7  - single functioning kidney (right w/ known severe hydronephrosis)  - non-oliguric TAO multifactorial largely i/s/o relative hypotension  - likely ATN now    Plan:  - Cr now downtrending  - monitor on diuresis as below  - dose adjust medications for Cr

## 2023-02-03 NOTE — PROGRESS NOTE ADULT - PROBLEM SELECTOR PLAN 1
- presented with 1 week abdominal pain, nausea, vomiting  - initial CT A/P c/w cholecystitis  - per surgery, not a candidate given comorbidities  - s/p ERCP 1/20 w/ stent  - repeat CT A/P for recurrent abdominal pain c/f perforated gallbladder  - perc rissa 1/25 - dislodged 2/1  - abdominal fluid cx w/ GPCs and candida  - repeat CT A/P for recurrent abdominal pain with dislodged cholecystostomy tube and increased fluid collection    Plan:  - c/w zosyn, fluconazole per ID  - plan for repeat ERCP w/ stent removal in 2 - 3 months  - follow fever/WBC curve, if c/f sepsis IR to replace dislodged rissa tube  - remains tender to palpation along right abdomen - presented with 1 week abdominal pain, nausea, vomiting  - initial CT A/P c/w cholecystitis  - per surgery, not a candidate given comorbidities  - s/p ERCP 1/20 w/ stent  - repeat CT A/P for recurrent abdominal pain c/f perforated gallbladder  - perc rissa 1/25 - dislodged 2/1  - abdominal fluid cx w/ GPCs and candida  - repeat CT A/P for recurrent abdominal pain with dislodged cholecystostomy tube and increased fluid collection    Plan:  - c/w zosyn, fluconazole per ID  - plan for repeat ERCP w/ stent removal in 2 - 3 months  - follow fever/WBC curve, if c/f sepsis IR to replace dislodged rissa tube  - remains tender to palpation along right abdomen c/f bile peritonitis, non-operable per surgery

## 2023-02-03 NOTE — PROGRESS NOTE ADULT - ASSESSMENT
82F history of HTN, HLD, DM2, HFpEF (EF 65%), CAD s/p CABG, Breast Ca s/p R partial mastectomy, Rectal Ca s/p resection presenting with abdominal pain x 1 week found to have acute cholecystitis, not a surgical candidate. S/p ERCP with stent placement course c/b gallbladder perforation s/p perc rissa w/ fluid c/f candida and GPCs on zosyn, fluconazole; perc rissa dislodged on 2/1/23. 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 New hematuria 2/2/23, urology consulted.

## 2023-02-03 NOTE — PROGRESS NOTE ADULT - ASSESSMENT
ASSESSMENT:  Patient is a 80 year old F with a PMHx of HTN, HLD, T2DM, HFpEF (EF 65% X/2022), breast cancer s/p R partial mastectomy, rectal carcinoma s/p resection, CAD s/p CABG (11/2020) who presents for 1 day of progressively worsening abdominal pain associated with nausea and NBNB vomiting. 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,  nephrology awared , previous diuretic test showed minimal function of that kidney)  non oliguric harleen likely in the view of relative  hypotension /hemodynamic --- cr still elevated   hyponatremia-- improve d  CVS----BP soft at times, stable at present    GI:  acute cholecystitis --- ERCP with stent placement  1/20/23  cholecystotomy tube placement  1/25/23 perc , removal of perc 2/1/23   -metabolic acidosis --improving   -hyperphosphatemias --improved     RECOMMENDATION:  1)Renal: cr improving    non oliguric harleen  avoid nephrotoxic medication  dose all medications for gfr ~10-15 ml/min   cont  bumex 1 mg daily , monitor resp status   cont  bicarb 650 mg bid   daily bmp  renal diet   2)CVS: BP stable cont  metoprolol 100 mg po bid (would add holding parameters)  3) ID: on zosyn and fluconazole   4) GI on board  50 urology rec appreciated     Brenda Rader  [facility]  Office: [phone]       ASSESSMENT:  Patient is a 80 year old F with a PMHx of HTN, HLD, T2DM, HFpEF (EF 65% X/2022), breast cancer s/p R partial mastectomy, rectal carcinoma s/p resection, CAD s/p CABG (11/2020) who presents for 1 day of progressively worsening abdominal pain associated with nausea and NBNB vomiting. 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,  nephrology awared , previous diuretic test showed minimal  function of that kidney)  non oliguric harleen likely in the view of relative  hypotension /hemodynamic --- cr still elevated   hyponatremia-- improve d  CVS----BP soft at times, stable at present    GI:  acute cholecystitis --- ERCP with stent placement  1/20/23  cholecystotomy tube placement  1/25/23 perc , removal of perc 2/1/23   -metabolic acidosis --improving   -hyperphosphatemias --improved     RECOMMENDATION:  1)Renal: cr improving    non oliguric harleen  avoid nephrotoxic medication  dose all medications for gfr ~10-15 ml/min   cont  bumex 1 mg daily , monitor resp status   cont  bicarb 650 mg bid   daily bmp,   renal diet   2)CVS: BP stable cont  metoprolol 100 mg po bid (would add holding parameters)  3) ID: on zosyn and fluconazole   4) GI on board  50 urology rec appreciated     Brenda Rader  [facility]  Office: [phone]

## 2023-02-03 NOTE — PROGRESS NOTE ADULT - ATTENDING COMMENTS
82F history of HTN, HLD, DM2, HFpEF (EF 65%), CAD s/p CABG, Breast Ca s/p R partial mastectomy, Rectal Ca s/p resection a/w acute cholecystitis, not a surgical candidate. S/p ERCP with stent placement course c/b gallbladder perforation s/p perc rissa w/ fluid c/f candida and GPCs on zosyn, fluconazole; perc rissa dislodged on 2/1/23. Additionally with recurrent SVT controlled on metoprolol, oral and throat pain c/f HSV on acyclovir. New     Rissa tube noted to be coiled in the peritoneum and was subsequently removed at bedside by IR on 2/1. CT on 2/3- Similar appearance of nondistended gallbladder with wall thickening and pericholecystic inflammation.    HIDA scan recommended by IR to evaluate need for reinsertion of cholecystostomy- pending. Monitor, continue abx. 82F history of HTN, HLD, DM2, HFpEF (EF 65%), CAD s/p CABG, Breast Ca s/p R partial mastectomy, Rectal Ca s/p resection a/w acute cholecystitis, not a surgical candidate. S/p ERCP with stent placement course c/b gallbladder perforation s/p perc rissa w/ fluid c/f candida and GPCs on zosyn, fluconazole; perc rissa dislodged on 2/1/23. Additionally with recurrent SVT controlled on metoprolol, oral and throat pain c/f HSV on acyclovir.     Rissa tube noted to be coiled in the peritoneum and was subsequently removed at bedside by IR on 2/1. CT on 2/3- Similar appearance of nondistended gallbladder with wall thickening and pericholecystic inflammation.    HIDA scan recommended by IR to evaluate need for reinsertion of cholecystostomy- pending. Monitor, continue abx.

## 2023-02-03 NOTE — PROGRESS NOTE ADULT - SUBJECTIVE AND OBJECTIVE BOX
NEPHROLOGY     Patient seen and examined      MEDICATIONS  (STANDING):  acetaminophen     Tablet .. 650 milliGRAM(s) Oral every 6 hours  acyclovir   Oral Tab/Cap 200 milliGRAM(s) Oral every 8 hours  apixaban 2.5 milliGRAM(s) Oral two times a day  aspirin  chewable 81 milliGRAM(s) Oral daily  atorvastatin 40 milliGRAM(s) Oral at bedtime  buMETAnide 1 milliGRAM(s) Oral daily  chlorhexidine 4% Liquid 1 Application(s) Topical <User Schedule>  dextrose 5%. 1000 milliLiter(s) (100 mL/Hr) IV Continuous <Continuous>  dextrose 5%. 1000 milliLiter(s) (50 mL/Hr) IV Continuous <Continuous>  dextrose 50% Injectable 25 Gram(s) IV Push once  dextrose 50% Injectable 12.5 Gram(s) IV Push once  dextrose 50% Injectable 25 Gram(s) IV Push once  fluconAZOLE   Tablet 200 milliGRAM(s) Oral every 24 hours  gabapentin 200 milliGRAM(s) Oral daily  glucagon  Injectable 1 milliGRAM(s) IntraMuscular once  insulin glargine Injectable (LANTUS) 19 Unit(s) SubCutaneous at bedtime  insulin lispro (ADMELOG) corrective regimen sliding scale   SubCutaneous three times a day before meals  insulin lispro (ADMELOG) corrective regimen sliding scale   SubCutaneous at bedtime  insulin lispro Injectable (ADMELOG) 6 Unit(s) SubCutaneous three times a day before meals  lidocaine 2% Viscous 15 milliLiter(s) Oral four times a day  metoprolol tartrate 100 milliGRAM(s) Oral every 12 hours  piperacillin/tazobactam IVPB.. 3.375 Gram(s) IV Intermittent every 12 hours  potassium chloride   Powder 20 milliEquivalent(s) Oral once    VITALS:  T(C): , Max: 36.6 (01-30-23 @ 17:16)  T(F): , Max: 97.9 (01-30-23 @ 17:16)  HR: 68 (01-31-23 @ 13:06)  BP: 128/78 (01-31-23 @ 13:06)  RR: 18 (01-31-23 @ 13:06)  SpO2: 100% (01-31-23 @ 13:06)    I and O's:    01-30 @ 07:01  -  01-31 @ 07:00  --------------------------------------------------------  IN: 820 mL / OUT: 2200 mL / NET: -1380 mL    01-31 @ 07:01  -  01-31 @ 13:49  --------------------------------------------------------  IN: 400 mL / OUT: 900 mL / NET: -500 mL    Weight (kg): 82.3 (01-30 @ 14:07)    PHYSICAL EXAM:  General: NAD; Alert  HEENT:  NCAT; PERRLA  Neck: No JVD; supple  Respiratory: CTA-b/l  Cardiac: RRR s1s2  Gastrointestinal: BS+, soft, NT/ND , biliary drain in placed   Urologic: No chen  Extremities: No peripheral edema, lymphedema at baseline     LABS:                        10.3   11.23 )-----------( 418      ( 31 Jan 2023 06:01 )             34.3     01-31    137  |  107  |  50<H>  ----------------------------<  196<H>  3.9   |  18<L>  |  3.45<H>    Ca    9.1      31 Jan 2023 06:01  Phos  4.6     01-31  Mg     2.1     01-31    TPro  6.6  /  Alb  2.9<L>  /  TBili  0.2  /  DBili  x   /  AST  13  /  ALT  10  /  AlkPhos  108  01-31   NEPHROLOGY     Patient seen and examined at bed side   no distress       MEDICATIONS  (STANDING):  acetaminophen     Tablet .. 650 milliGRAM(s) Oral every 6 hours  acyclovir   Oral Tab/Cap 200 milliGRAM(s) Oral every 8 hours  apixaban 2.5 milliGRAM(s) Oral two times a day  aspirin  chewable 81 milliGRAM(s) Oral daily  atorvastatin 40 milliGRAM(s) Oral at bedtime  buMETAnide 1 milliGRAM(s) Oral daily  chlorhexidine 4% Liquid 1 Application(s) Topical <User Schedule>  dextrose 5%. 1000 milliLiter(s) (100 mL/Hr) IV Continuous <Continuous>  dextrose 5%. 1000 milliLiter(s) (50 mL/Hr) IV Continuous <Continuous>  dextrose 50% Injectable 25 Gram(s) IV Push once  dextrose 50% Injectable 12.5 Gram(s) IV Push once  dextrose 50% Injectable 25 Gram(s) IV Push once  fluconAZOLE   Tablet 200 milliGRAM(s) Oral every 24 hours  gabapentin 200 milliGRAM(s) Oral daily  glucagon  Injectable 1 milliGRAM(s) IntraMuscular once  insulin glargine Injectable (LANTUS) 19 Unit(s) SubCutaneous at bedtime  insulin lispro (ADMELOG) corrective regimen sliding scale   SubCutaneous three times a day before meals  insulin lispro (ADMELOG) corrective regimen sliding scale   SubCutaneous at bedtime  insulin lispro Injectable (ADMELOG) 6 Unit(s) SubCutaneous three times a day before meals  lidocaine 2% Viscous 15 milliLiter(s) Oral four times a day  metoprolol tartrate 100 milliGRAM(s) Oral every 12 hours  piperacillin/tazobactam IVPB.. 3.375 Gram(s) IV Intermittent every 12 hours  potassium chloride   Powder 20 milliEquivalent(s) Oral once    VITALS:  T(C): , Max: 36.6 (01-30-23 @ 17:16)  T(F): , Max: 97.9 (01-30-23 @ 17:16)  HR: 68 (01-31-23 @ 13:06)  BP: 128/78 (01-31-23 @ 13:06)  RR: 18 (01-31-23 @ 13:06)  SpO2: 100% (01-31-23 @ 13:06)    I and O's:    01-30 @ 07:01  -  01-31 @ 07:00  --------------------------------------------------------  IN: 820 mL / OUT: 2200 mL / NET: -1380 mL    01-31 @ 07:01  -  01-31 @ 13:49  --------------------------------------------------------  IN: 400 mL / OUT: 900 mL / NET: -500 mL    Weight (kg): 82.3 (01-30 @ 14:07)    PHYSICAL EXAM:  General: NAD; Alert  HEENT:  NCAT; PERRLA  Neck: No JVD; supple  Respiratory: CTA-b/l  Cardiac: RRR s1s2  Gastrointestinal: BS+, soft, NT/ND , biliary drain in placed   Urologic: No chen  Extremities: No peripheral edema, lymphedema at baseline     LABS:                        10.3   11.23 )-----------( 418      ( 31 Jan 2023 06:01 )             34.3     01-31    137  |  107  |  50<H>  ----------------------------<  196<H>  3.9   |  18<L>  |  3.45<H>    Ca    9.1      31 Jan 2023 06:01  Phos  4.6     01-31  Mg     2.1     01-31    TPro  6.6  /  Alb  2.9<L>  /  TBili  0.2  /  DBili  x   /  AST  13  /  ALT  10  /  AlkPhos  108  01-31

## 2023-02-03 NOTE — PROGRESS NOTE ADULT - ASSESSMENT
82 year old female with HTN, HLD, DM2, HFpEF, breast cancer s/p R partial mastectomy, rectal carcinoma s/p resection, CAD/ CABG presenting with abd pain, nausea and vomiting, found to have acute cholecystitis with choledocholithiasis. Underwent ERCP with biliary stent placement with repeat CT A/P with new fluid collection near the GB neck s/p perc rissa on 1/25 - culture with yeast. Leukocytosis overall better but remains elevated. Repeat CT with slight interval increase in GB neck fluid collection with drainage catheter in the RUQ adjacent to the GB and with new R perinephric fluid and loculated R RP fluid collection. No longer with biliary drain.    Recommend:  #Acute cholecystitis with choledocholithiasis/ abscess  -Continue zosyn, patient tolerating  -Continue fluconazole 200 mg po q 24 hours  -Monitor for fevers  -Repeat CT with results as above - IR evaluation for increase in size of GB fluid collection and for new R perinephric and loculated R RP fluid collections. Also remains with pain may need re-insertion of biliary drain  -Trend WBC - remains elevated  -Trend LFTs/ t bili    #Oral lesions, vesicular lesions  -Continue acyclovir x 7 days    Dk Fonseca MD  Available through MS Teams  If no response, or after 5pm/weekends, call [phone]

## 2023-02-03 NOTE — PROGRESS NOTE ADULT - SUBJECTIVE AND OBJECTIVE BOX
Luciano Partida PGY1  pager [phone] or check resident tab for coverage    Patient is a 82y old  Female who presents with a chief complaint of acute cholecystitis, DKA (03 Feb 2023 07:47)    SUBJECTIVE / OVERNIGHT EVENTS:    NAEO.  Patient this morning is,    Brief Daily Plan:    MEDICATIONS  MEDICATIONS  (STANDING):  acyclovir   Oral Tab/Cap 200 milliGRAM(s) Oral every 8 hours  atorvastatin 40 milliGRAM(s) Oral at bedtime  buMETAnide 1 milliGRAM(s) Oral daily  chlorhexidine 4% Liquid 1 Application(s) Topical <User Schedule>  dextrose 5%. 1000 milliLiter(s) (100 mL/Hr) IV Continuous <Continuous>  dextrose 5%. 1000 milliLiter(s) (50 mL/Hr) IV Continuous <Continuous>  dextrose 50% Injectable 25 Gram(s) IV Push once  dextrose 50% Injectable 12.5 Gram(s) IV Push once  dextrose 50% Injectable 25 Gram(s) IV Push once  fluconAZOLE   Tablet 200 milliGRAM(s) Oral every 24 hours  gabapentin 200 milliGRAM(s) Oral daily  glucagon  Injectable 1 milliGRAM(s) IntraMuscular once  insulin glargine Injectable (LANTUS) 21 Unit(s) SubCutaneous at bedtime  insulin lispro (ADMELOG) corrective regimen sliding scale   SubCutaneous at bedtime  insulin lispro (ADMELOG) corrective regimen sliding scale   SubCutaneous three times a day before meals  insulin lispro Injectable (ADMELOG) 7 Unit(s) SubCutaneous three times a day before meals  lidocaine 2% Viscous 15 milliLiter(s) Oral four times a day  magnesium sulfate  IVPB 2 Gram(s) IV Intermittent once  metoprolol tartrate 100 milliGRAM(s) Oral every 12 hours  piperacillin/tazobactam IVPB.. 3.375 Gram(s) IV Intermittent every 12 hours  sodium bicarbonate 650 milliGRAM(s) Oral two times a day    MEDICATIONS  (PRN):  acetaminophen     Tablet .. 650 milliGRAM(s) Oral every 6 hours PRN Mild Pain (1 - 3), Moderate Pain (4 - 6)  albuterol/ipratropium for Nebulization 3 milliLiter(s) Nebulizer every 6 hours PRN Shortness of Breath and/or Wheezing  aluminum hydroxide/magnesium hydroxide/simethicone Suspension 30 milliLiter(s) Oral every 6 hours PRN Dyspepsia  dextrose Oral Gel 15 Gram(s) Oral once PRN Blood Glucose LESS THAN 70 milliGRAM(s)/deciliter  sodium chloride 0.9% lock flush 10 milliLiter(s) IV Push every 1 hour PRN Pre/post blood products, medications, blood draw, and to maintain line patency      VITALS  Vital Signs Last 24 Hrs  T(C): 36.5 (03 Feb 2023 05:45), Max: 37.1 (02 Feb 2023 10:21)  T(F): 97.7 (03 Feb 2023 05:45), Max: 98.7 (02 Feb 2023 10:21)  HR: 70 (03 Feb 2023 05:45) (67 - 74)  BP: 121/55 (03 Feb 2023 05:45) (103/68 - 121/67)  BP(mean): --  RR: 18 (03 Feb 2023 05:45) (18 - 18)  SpO2: 98% (03 Feb 2023 05:45) (96% - 100%)    Parameters below as of 03 Feb 2023 05:45  Patient On (Oxygen Delivery Method): nasal cannula  O2 Flow (L/min): 1      PHYSICAL EXAM:  GENERAL: no distress  PSYCH: A&O x3  HEAD: Atraumatic, Normocephalic  NECK: Supple, No JVD  CHEST/LUNG: clear to auscultation bilaterally  HEART: regular rate and rhythm, no murmurs  ABDOMEN: nontender to palpation, no rebound tenderness/guarding  EXTREMITIES: no edema on bilateral LE  NEUROLOGY: no focal neurologic deficit  SKIN: No rashes or lesions    LABS:  All labs personally Reviewed.    RADIOLOGY & ADDITIONAL TESTS:  All imaging personally Reviewed.  Luciano Partida PGY1  pager [phone] or check resident tab for coverage    Patient is a 82y old  Female who presents with a chief complaint of acute cholecystitis, DKA (03 Feb 2023 07:47)    SUBJECTIVE / OVERNIGHT EVENTS:    NAEO.  Patient this morning is feeling pain in right abdomen, tender to palpation, feels similar to days prior. Able to tolerate PO without issue. Shortness of breath is at baseline.    Brief Daily Plan:  ·	HIDA per IR  ·	f/u IR recs for perc rissa  ·	appreciate all consulted services  ·	hold eliquis for possible procedure per IR    MEDICATIONS  MEDICATIONS  (STANDING):  acyclovir   Oral Tab/Cap 200 milliGRAM(s) Oral every 8 hours  atorvastatin 40 milliGRAM(s) Oral at bedtime  buMETAnide 1 milliGRAM(s) Oral daily  chlorhexidine 4% Liquid 1 Application(s) Topical <User Schedule>  dextrose 5%. 1000 milliLiter(s) (100 mL/Hr) IV Continuous <Continuous>  dextrose 5%. 1000 milliLiter(s) (50 mL/Hr) IV Continuous <Continuous>  dextrose 50% Injectable 25 Gram(s) IV Push once  dextrose 50% Injectable 12.5 Gram(s) IV Push once  dextrose 50% Injectable 25 Gram(s) IV Push once  fluconAZOLE   Tablet 200 milliGRAM(s) Oral every 24 hours  gabapentin 200 milliGRAM(s) Oral daily  glucagon  Injectable 1 milliGRAM(s) IntraMuscular once  insulin glargine Injectable (LANTUS) 21 Unit(s) SubCutaneous at bedtime  insulin lispro (ADMELOG) corrective regimen sliding scale   SubCutaneous at bedtime  insulin lispro (ADMELOG) corrective regimen sliding scale   SubCutaneous three times a day before meals  insulin lispro Injectable (ADMELOG) 7 Unit(s) SubCutaneous three times a day before meals  lidocaine 2% Viscous 15 milliLiter(s) Oral four times a day  magnesium sulfate  IVPB 2 Gram(s) IV Intermittent once  metoprolol tartrate 100 milliGRAM(s) Oral every 12 hours  piperacillin/tazobactam IVPB.. 3.375 Gram(s) IV Intermittent every 12 hours  sodium bicarbonate 650 milliGRAM(s) Oral two times a day    MEDICATIONS  (PRN):  acetaminophen     Tablet .. 650 milliGRAM(s) Oral every 6 hours PRN Mild Pain (1 - 3), Moderate Pain (4 - 6)  albuterol/ipratropium for Nebulization 3 milliLiter(s) Nebulizer every 6 hours PRN Shortness of Breath and/or Wheezing  aluminum hydroxide/magnesium hydroxide/simethicone Suspension 30 milliLiter(s) Oral every 6 hours PRN Dyspepsia  dextrose Oral Gel 15 Gram(s) Oral once PRN Blood Glucose LESS THAN 70 milliGRAM(s)/deciliter  sodium chloride 0.9% lock flush 10 milliLiter(s) IV Push every 1 hour PRN Pre/post blood products, medications, blood draw, and to maintain line patency      VITALS  Vital Signs Last 24 Hrs  T(C): 36.5 (03 Feb 2023 05:45), Max: 37.1 (02 Feb 2023 10:21)  T(F): 97.7 (03 Feb 2023 05:45), Max: 98.7 (02 Feb 2023 10:21)  HR: 70 (03 Feb 2023 05:45) (67 - 74)  BP: 121/55 (03 Feb 2023 05:45) (103/68 - 121/67)  BP(mean): --  RR: 18 (03 Feb 2023 05:45) (18 - 18)  SpO2: 98% (03 Feb 2023 05:45) (96% - 100%)    Parameters below as of 03 Feb 2023 05:45  Patient On (Oxygen Delivery Method): nasal cannula  O2 Flow (L/min): 1      PHYSICAL EXAM:  GENERAL: no distress  PSYCH: A&O x3  HEAD: Atraumatic, Normocephalic  NECK: Supple, No JVD  CHEST/LUNG: clear to auscultation bilaterally  HEART: regular rate and rhythm, no murmurs  ABDOMEN: tender to palpation right abdomen  EXTREMITIES: trace edema  NEUROLOGY: no focal neurologic deficit  SKIN: No rashes or lesions    LABS:  All labs personally Reviewed.    RADIOLOGY & ADDITIONAL TESTS:  All imaging personally Reviewed.

## 2023-02-04 LAB
ALBUMIN SERPL ELPH-MCNC: 3 G/DL — LOW (ref 3.3–5)
ALP SERPL-CCNC: 269 U/L — HIGH (ref 40–120)
ALT FLD-CCNC: 26 U/L — SIGNIFICANT CHANGE UP (ref 10–45)
ANION GAP SERPL CALC-SCNC: 12 MMOL/L — SIGNIFICANT CHANGE UP (ref 5–17)
AST SERPL-CCNC: 39 U/L — SIGNIFICANT CHANGE UP (ref 10–40)
BILIRUB SERPL-MCNC: 0.1 MG/DL — LOW (ref 0.2–1.2)
BUN SERPL-MCNC: 34 MG/DL — HIGH (ref 7–23)
CALCIUM SERPL-MCNC: 8.9 MG/DL — SIGNIFICANT CHANGE UP (ref 8.4–10.5)
CHLORIDE SERPL-SCNC: 105 MMOL/L — SIGNIFICANT CHANGE UP (ref 96–108)
CO2 SERPL-SCNC: 22 MMOL/L — SIGNIFICANT CHANGE UP (ref 22–31)
CREAT SERPL-MCNC: 2.34 MG/DL — HIGH (ref 0.5–1.3)
EGFR: 20 ML/MIN/1.73M2 — LOW
GLUCOSE BLDC GLUCOMTR-MCNC: 190 MG/DL — HIGH (ref 70–99)
GLUCOSE BLDC GLUCOMTR-MCNC: 213 MG/DL — HIGH (ref 70–99)
GLUCOSE BLDC GLUCOMTR-MCNC: 222 MG/DL — HIGH (ref 70–99)
GLUCOSE BLDC GLUCOMTR-MCNC: 272 MG/DL — HIGH (ref 70–99)
GLUCOSE SERPL-MCNC: 274 MG/DL — HIGH (ref 70–99)
HCT VFR BLD CALC: 31.7 % — LOW (ref 34.5–45)
HGB BLD-MCNC: 9.6 G/DL — LOW (ref 11.5–15.5)
MAGNESIUM SERPL-MCNC: 2 MG/DL — SIGNIFICANT CHANGE UP (ref 1.6–2.6)
MCHC RBC-ENTMCNC: 29.1 PG — SIGNIFICANT CHANGE UP (ref 27–34)
MCHC RBC-ENTMCNC: 30.3 GM/DL — LOW (ref 32–36)
MCV RBC AUTO: 96.1 FL — SIGNIFICANT CHANGE UP (ref 80–100)
NRBC # BLD: 0 /100 WBCS — SIGNIFICANT CHANGE UP (ref 0–0)
PHOSPHATE SERPL-MCNC: 4.1 MG/DL — SIGNIFICANT CHANGE UP (ref 2.5–4.5)
PLATELET # BLD AUTO: 452 K/UL — HIGH (ref 150–400)
POTASSIUM SERPL-MCNC: 4 MMOL/L — SIGNIFICANT CHANGE UP (ref 3.5–5.3)
POTASSIUM SERPL-SCNC: 4 MMOL/L — SIGNIFICANT CHANGE UP (ref 3.5–5.3)
PROT SERPL-MCNC: 6.8 G/DL — SIGNIFICANT CHANGE UP (ref 6–8.3)
RBC # BLD: 3.3 M/UL — LOW (ref 3.8–5.2)
RBC # FLD: 16.5 % — HIGH (ref 10.3–14.5)
SODIUM SERPL-SCNC: 139 MMOL/L — SIGNIFICANT CHANGE UP (ref 135–145)
WBC # BLD: 12.04 K/UL — HIGH (ref 3.8–10.5)
WBC # FLD AUTO: 12.04 K/UL — HIGH (ref 3.8–10.5)

## 2023-02-04 PROCEDURE — 99232 SBSQ HOSP IP/OBS MODERATE 35: CPT | Mod: GC

## 2023-02-04 RX ORDER — APIXABAN 2.5 MG/1
2.5 TABLET, FILM COATED ORAL
Refills: 0 | Status: DISCONTINUED | OUTPATIENT
Start: 2023-02-04 | End: 2023-02-07

## 2023-02-04 RX ORDER — INSULIN GLARGINE 100 [IU]/ML
21 INJECTION, SOLUTION SUBCUTANEOUS AT BEDTIME
Refills: 0 | Status: DISCONTINUED | OUTPATIENT
Start: 2023-02-04 | End: 2023-02-07

## 2023-02-04 RX ADMIN — Medication 650 MILLIGRAM(S): at 06:04

## 2023-02-04 RX ADMIN — LIDOCAINE 15 MILLILITER(S): 4 CREAM TOPICAL at 06:04

## 2023-02-04 RX ADMIN — Medication 6: at 08:24

## 2023-02-04 RX ADMIN — ATORVASTATIN CALCIUM 40 MILLIGRAM(S): 80 TABLET, FILM COATED ORAL at 21:24

## 2023-02-04 RX ADMIN — FLUCONAZOLE 200 MILLIGRAM(S): 150 TABLET ORAL at 17:13

## 2023-02-04 RX ADMIN — Medication 7 UNIT(S): at 17:26

## 2023-02-04 RX ADMIN — PIPERACILLIN AND TAZOBACTAM 25 GRAM(S): 4; .5 INJECTION, POWDER, LYOPHILIZED, FOR SOLUTION INTRAVENOUS at 17:15

## 2023-02-04 RX ADMIN — Medication 81 MILLIGRAM(S): at 11:16

## 2023-02-04 RX ADMIN — Medication 100 MILLIGRAM(S): at 06:04

## 2023-02-04 RX ADMIN — Medication 7 UNIT(S): at 13:32

## 2023-02-04 RX ADMIN — Medication 100 MILLIGRAM(S): at 17:13

## 2023-02-04 RX ADMIN — Medication 4: at 13:33

## 2023-02-04 RX ADMIN — Medication 650 MILLIGRAM(S): at 17:13

## 2023-02-04 RX ADMIN — CHLORHEXIDINE GLUCONATE 1 APPLICATION(S): 213 SOLUTION TOPICAL at 08:26

## 2023-02-04 RX ADMIN — APIXABAN 2.5 MILLIGRAM(S): 2.5 TABLET, FILM COATED ORAL at 17:15

## 2023-02-04 RX ADMIN — PIPERACILLIN AND TAZOBACTAM 25 GRAM(S): 4; .5 INJECTION, POWDER, LYOPHILIZED, FOR SOLUTION INTRAVENOUS at 06:50

## 2023-02-04 RX ADMIN — Medication 4: at 17:26

## 2023-02-04 RX ADMIN — INSULIN GLARGINE 21 UNIT(S): 100 INJECTION, SOLUTION SUBCUTANEOUS at 21:24

## 2023-02-04 RX ADMIN — GABAPENTIN 200 MILLIGRAM(S): 400 CAPSULE ORAL at 11:16

## 2023-02-04 RX ADMIN — BUMETANIDE 1 MILLIGRAM(S): 0.25 INJECTION INTRAMUSCULAR; INTRAVENOUS at 06:04

## 2023-02-04 RX ADMIN — Medication 7 UNIT(S): at 08:24

## 2023-02-04 RX ADMIN — LIDOCAINE 15 MILLILITER(S): 4 CREAM TOPICAL at 17:13

## 2023-02-04 NOTE — PROGRESS NOTE ADULT - ATTENDING COMMENTS
Hospitalist- Kaz Metzger MD  Available on MS Teams  If no response or off-hours, page [phone]  -------------------------------------  Pt with acute cholecystitis, poor surgical candidate s/p PCT now dislodged, some ongoing abd pain and persistent leukocytosis- per IR no plans to reinsert drain, per ID will continue zosyn and flucon for now- likely to need prolonged course.

## 2023-02-04 NOTE — PROGRESS NOTE ADULT - ASSESSMENT
on nasal cannula  afebrile  denies complaints    acetaminophen     Tablet .. 650 milliGRAM(s) Oral every 6 hours PRN  albuterol/ipratropium for Nebulization 3 milliLiter(s) Nebulizer every 6 hours PRN  aluminum hydroxide/magnesium hydroxide/simethicone Suspension 30 milliLiter(s) Oral every 6 hours PRN  apixaban 2.5 milliGRAM(s) Oral two times a day  aspirin  chewable 81 milliGRAM(s) Oral daily  atorvastatin 40 milliGRAM(s) Oral at bedtime  buMETAnide 1 milliGRAM(s) Oral daily  chlorhexidine 4% Liquid 1 Application(s) Topical <User Schedule>  dextrose 5%. 1000 milliLiter(s) IV Continuous <Continuous>  dextrose 5%. 1000 milliLiter(s) IV Continuous <Continuous>  dextrose 50% Injectable 25 Gram(s) IV Push once  dextrose 50% Injectable 12.5 Gram(s) IV Push once  dextrose 50% Injectable 25 Gram(s) IV Push once  dextrose Oral Gel 15 Gram(s) Oral once PRN  fluconAZOLE   Tablet 200 milliGRAM(s) Oral every 24 hours  gabapentin 200 milliGRAM(s) Oral daily  glucagon  Injectable 1 milliGRAM(s) IntraMuscular once  insulin glargine Injectable (LANTUS) 21 Unit(s) SubCutaneous at bedtime  insulin lispro (ADMELOG) corrective regimen sliding scale   SubCutaneous at bedtime  insulin lispro (ADMELOG) corrective regimen sliding scale   SubCutaneous three times a day before meals  insulin lispro Injectable (ADMELOG) 7 Unit(s) SubCutaneous three times a day before meals  lidocaine 2% Viscous 15 milliLiter(s) Oral four times a day  metoprolol tartrate 100 milliGRAM(s) Oral every 12 hours  piperacillin/tazobactam IVPB.. 3.375 Gram(s) IV Intermittent every 12 hours  sodium bicarbonate 650 milliGRAM(s) Oral two times a day  sodium chloride 0.9% lock flush 10 milliLiter(s) IV Push every 1 hour PRN      VITAL:  T(C): , Max: 37.1 (02-03-23 @ 21:55)  T(F): , Max: 98.7 (02-03-23 @ 21:55)  HR: 79 (02-04-23 @ 10:07)  BP: 145/74 (02-04-23 @ 10:07)  BP(mean): --  RR: 18 (02-04-23 @ 10:07)  SpO2: 98% (02-04-23 @ 10:07)  Wt(kg): --    02-03-23 @ 07:01  -  02-04-23 @ 07:00  --------------------------------------------------------  IN: 980 mL / OUT: 1400 mL / NET: -420 mL    02-04-23 @ 07:01  -  02-04-23 @ 12:52  --------------------------------------------------------  IN: 400 mL / OUT: 1300 mL / NET: -900 mL        PHYSICAL EXAM:  General: NAD; Alert  HEENT:  NCAT; PERRLA  Neck: No JVD; supple  Respiratory: CTA-b/l  Cardiac: RRR s1s2  Gastrointestinal: BS+, soft, NT/ND , biliary drain in placed   Urologic: No chen  Extremities: No peripheral edema, lymphedema at baseline       LABS:                          9.6    12.04 )-----------( 452      ( 04 Feb 2023 07:24 )             31.7     Na(139)/K(4.0)/Cl(105)/HCO3(22)/BUN(34)/Cr(2.34)Glu(274)/Ca(8.9)/Mg(2.0)/PO4(4.1)    02-04 @ 07:25  Na(139)/K(3.9)/Cl(107)/HCO3(20)/BUN(39)/Cr(2.59)Glu(196)/Ca(8.7)/Mg(1.8)/PO4(4.1)    02-03 @ 09:03  Na(136)/K(3.9)/Cl(105)/HCO3(19)/BUN(45)/Cr(2.87)Glu(101)/Ca(8.9)/Mg(1.9)/PO4(4.2)    02-02 @ 07:14            ASSESSMENT/PLAN  Patient is a 80 year old F with a PMHx of HTN, HLD, T2DM, HFpEF (EF 65% X/2022), breast cancer s/p R partial mastectomy, rectal carcinoma s/p resection, CAD s/p CABG (11/2020) who presents for 1 day of progressively worsening abdominal pain associated with nausea and NBNB vomiting. 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,  nephrology awared , previous diuretic test showed minimal  function of that kidney)  non oliguric harleen likely in the view of relative  hypotension /hemodynamic --- cr still elevated   hyponatremia-- resolving/ resolved  CVS----BP soft at times, acceptable at present    GI:  acute cholecystitis --- ERCP with stent placement  1/20/23  cholecystotomy tube placement  1/25/23 perc , removal of perc 2/1/23   -metabolic acidosis --resolving  -hyperphosphatemia -much improved    RECOMMENDATION:  1)Renal: renal fxn improving   non oliguric harleen  avoid nephrotoxic medication  dose all medications for gfr ~10-15 ml/min   cont  bumex 1 mg daily , monitor resp status   cont  bicarb 650 mg bid   daily bmp,   renal diet   2)CVS: BP stable cont  metoprolol 100 mg po bid (would add holding parameters)  3) ID: on zosyn and fluconazole   4) GI on board  5) urology on board        Yevgeniy Castelan NP-BC  [facility]  [phone]

## 2023-02-04 NOTE — PROGRESS NOTE ADULT - PROBLEM SELECTOR PLAN 7
Diet: CC  DVT PPx: hold home eliquis for possible procedure per IR  Dispo: likely [facility] pending medical optimization

## 2023-02-04 NOTE — PROGRESS NOTE ADULT - PROBLEM SELECTOR PLAN 1
- presented with 1 week abdominal pain, nausea, vomiting  - initial CT A/P c/w cholecystitis  - per surgery, not a candidate given comorbidities  - s/p ERCP 1/20 w/ stent  - repeat CT A/P for recurrent abdominal pain c/f perforated gallbladder  - perc rissa 1/25 - dislodged 2/1  - abdominal fluid cx w/ GPCs and candida  - repeat CT A/P for recurrent abdominal pain with dislodged cholecystostomy tube and increased fluid collection    Plan:  - c/w zosyn, fluconazole per ID  - plan for repeat ERCP w/ stent removal in 2 - 3 months  - follow fever/WBC curve, if c/f sepsis IR to replace dislodged rissa tube  - remains tender to palpation along right abdomen c/f bile peritonitis, non-operable per surgery - presented with 1 week abdominal pain, nausea, vomiting  - initial CT A/P c/w cholecystitis  - per surgery, not a candidate given comorbidities  - s/p ERCP 1/20 w/ stent  - repeat CT A/P for recurrent abdominal pain c/f perforated gallbladder  - perc rissa 1/25 - dislodged 2/1  - abdominal fluid cx w/ GPCs and candida  - repeat CT A/P for recurrent abdominal pain with dislodged cholecystostomy tube and increased fluid collection  - HIDA 2/3 negative, patent cystic duct  - right abdomen TTP c/f bile peritonitis, non-operable per surgery    Plan:  - c/w zosyn, fluconazole per ID  - plan for repeat ERCP w/ stent removal in 2 - 3 months  - follow fever/WBC curve, if c/f sepsis IR to replace dislodged rissa tube  - abdominal pain improving

## 2023-02-04 NOTE — PROGRESS NOTE ADULT - SUBJECTIVE AND OBJECTIVE BOX
DATE OF SERVICE: 02-04-23 @ 14:37    Patient is a 82y old  Female who presents with a chief complaint of acute cholecystitis, DKA (04 Feb 2023 12:52)      INTERVAL HISTORY: Feels ok.     REVIEW OF SYSTEMS:  CONSTITUTIONAL: No weakness  EYES/ENT: No visual changes;  No throat pain   NECK: No pain or stiffness  RESPIRATORY: No cough, wheezing; No shortness of breath  CARDIOVASCULAR: No chest pain or palpitations  GASTROINTESTINAL: No abdominal  pain. No nausea, vomiting, or hematemesis  GENITOURINARY: No dysuria, frequency or hematuria  NEUROLOGICAL: No stroke like symptoms  SKIN: No rashes    TELEMETRY Personally reviewed: SR 60-70  	  MEDICATIONS:  buMETAnide 1 milliGRAM(s) Oral daily  metoprolol tartrate 100 milliGRAM(s) Oral every 12 hours        PHYSICAL EXAM:  T(C): 36.7 (02-04-23 @ 13:06), Max: 37.1 (02-03-23 @ 21:55)  HR: 77 (02-04-23 @ 13:06) (66 - 79)  BP: 147/68 (02-04-23 @ 13:06) (112/75 - 147/68)  RR: 18 (02-04-23 @ 13:06) (18 - 18)  SpO2: 99% (02-04-23 @ 13:06) (97% - 100%)  Wt(kg): --  I&O's Summary    03 Feb 2023 07:01  -  04 Feb 2023 07:00  --------------------------------------------------------  IN: 980 mL / OUT: 1400 mL / NET: -420 mL    04 Feb 2023 07:01  -  04 Feb 2023 14:37  --------------------------------------------------------  IN: 560 mL / OUT: 1300 mL / NET: -740 mL          Appearance: In no distress	  HEENT:    PERRL, EOMI	  Cardiovascular:  S1 S2, No JVD  Respiratory: Lungs clear to auscultation	  Gastrointestinal:  Soft, Non-tender, + BS	  Vascularature:  No edema of LE  Psychiatric: Appropriate affect   Neuro: no acute focal deficits                               9.6    12.04 )-----------( 452      ( 04 Feb 2023 07:24 )             31.7     02-04    139  |  105  |  34<H>  ----------------------------<  274<H>  4.0   |  22  |  2.34<H>    Ca    8.9      04 Feb 2023 07:25  Phos  4.1     02-04  Mg     2.0     02-04    TPro  6.8  /  Alb  3.0<L>  /  TBili  0.1<L>  /  DBili  x   /  AST  39  /  ALT  26  /  AlkPhos  269<H>  02-04        Labs personally reviewed      ASSESSMENT/PLAN: 	    80 year old woman with CAD s/p CABG in 2020, prior DVT on Eliquis, HTN, and compensated diastolic CHF presents with acute cholecystitis.       #Post-operative risk evaluation-  S/p ERCP with CBD stent. Tolerate procedure well.   Ms. Batista displays no signs or symptoms of acute coronary ischemia or decompensated CHF.  Admission EKG is sinus with pre-existing anteroseptal q-waves.  Recent echo noted- normal LV systolic function with no hemodynamically significant valvular disease.    #Decompensated diastolic CHF-  Patient with severe pulm pressures on echo  c/w Bumex 1mg PO daily    #CAD s/p CABG-  No signs of active ischemic.  c/w ASA and statin  - 1/26 with c/o epigastric burning a/b episodes of emesis. ECG performed, no ischemic changes noted.   -1/28 c/o chest pain and epigastric pain similar to 2 days ago. Repeat EKG with no ischemic changes. Trop wnl.  This is most likely noncardiac in nature given patient's recent cholecystitis     #Sepsis   - 2/2 acute cholecystitis   - S/p CBD stent  - s/p IR drain 1/25  - Afebrile, WBC downtrending  - s/p ERCP 1/20 w/ stent  - perc rissa 1/25 - dislodged 2/1  - abdominal fluid cx w/ GPCs and candida  - c/w zosyn, fluconazole per ID  - plan for repeat ERCP w/ stent removal in 2 - 3 months  - follow fever/WBC curve, if c/f sepsis IR to replace dislodged rissa tube.    #SVT  - 1/26 patent HR in 130s while sleeping. Patient missed am dose of Metoprolol 150mg PO.  - Resume Metoprolol  - Adenosine 6mg IV x1 with termination of SVT  - cont to monitor on tele  - 1/27 with recurrent SVT s/p adenosine today   - EP consulted. Likely A-Tach.   - HR has been stable. Metoprolol decreased to 100mg PO BID.         Delaney Vasquez, FABIOLA-NP   Sergey Winchester DO [facility]  Cardiovascular Medicine  [address], [address]  Available through call or text on [employer]  Office: [phone]

## 2023-02-04 NOTE — PROGRESS NOTE ADULT - SUBJECTIVE AND OBJECTIVE BOX
Luciano Partida PGY1  pager [phone] or check resident tab for coverage    Patient is a 82y old  Female who presents with a chief complaint of acute cholecystitis, DKA (03 Feb 2023 18:25)    SUBJECTIVE / OVERNIGHT EVENTS:    NAEO.  Patient this morning is,    Brief Daily Plan:    MEDICATIONS  MEDICATIONS  (STANDING):  aspirin  chewable 81 milliGRAM(s) Oral daily  atorvastatin 40 milliGRAM(s) Oral at bedtime  buMETAnide 1 milliGRAM(s) Oral daily  chlorhexidine 4% Liquid 1 Application(s) Topical <User Schedule>  dextrose 5%. 1000 milliLiter(s) (100 mL/Hr) IV Continuous <Continuous>  dextrose 5%. 1000 milliLiter(s) (50 mL/Hr) IV Continuous <Continuous>  dextrose 50% Injectable 25 Gram(s) IV Push once  dextrose 50% Injectable 12.5 Gram(s) IV Push once  dextrose 50% Injectable 25 Gram(s) IV Push once  fluconAZOLE   Tablet 200 milliGRAM(s) Oral every 24 hours  gabapentin 200 milliGRAM(s) Oral daily  glucagon  Injectable 1 milliGRAM(s) IntraMuscular once  insulin glargine Injectable (LANTUS) 10 Unit(s) SubCutaneous at bedtime  insulin lispro (ADMELOG) corrective regimen sliding scale   SubCutaneous at bedtime  insulin lispro (ADMELOG) corrective regimen sliding scale   SubCutaneous three times a day before meals  insulin lispro Injectable (ADMELOG) 7 Unit(s) SubCutaneous three times a day before meals  lidocaine 2% Viscous 15 milliLiter(s) Oral four times a day  metoprolol tartrate 100 milliGRAM(s) Oral every 12 hours  piperacillin/tazobactam IVPB.. 3.375 Gram(s) IV Intermittent every 12 hours  sodium bicarbonate 650 milliGRAM(s) Oral two times a day    MEDICATIONS  (PRN):  acetaminophen     Tablet .. 650 milliGRAM(s) Oral every 6 hours PRN Mild Pain (1 - 3), Moderate Pain (4 - 6)  albuterol/ipratropium for Nebulization 3 milliLiter(s) Nebulizer every 6 hours PRN Shortness of Breath and/or Wheezing  aluminum hydroxide/magnesium hydroxide/simethicone Suspension 30 milliLiter(s) Oral every 6 hours PRN Dyspepsia  dextrose Oral Gel 15 Gram(s) Oral once PRN Blood Glucose LESS THAN 70 milliGRAM(s)/deciliter  sodium chloride 0.9% lock flush 10 milliLiter(s) IV Push every 1 hour PRN Pre/post blood products, medications, blood draw, and to maintain line patency      VITALS  Vital Signs Last 24 Hrs  T(C): 36.4 (04 Feb 2023 05:00), Max: 37.1 (03 Feb 2023 21:55)  T(F): 97.6 (04 Feb 2023 05:00), Max: 98.7 (03 Feb 2023 21:55)  HR: 66 (04 Feb 2023 05:00) (64 - 70)  BP: 112/75 (04 Feb 2023 05:00) (112/75 - 141/84)  BP(mean): --  RR: 18 (04 Feb 2023 05:00) (18 - 18)  SpO2: 98% (04 Feb 2023 05:00) (97% - 100%)    Parameters below as of 04 Feb 2023 05:00  Patient On (Oxygen Delivery Method): nasal cannula  O2 Flow (L/min): 1      PHYSICAL EXAM:  GENERAL: no distress  PSYCH: A&O x3  HEAD: Atraumatic, Normocephalic  NECK: Supple, No JVD  CHEST/LUNG: clear to auscultation bilaterally  HEART: regular rate and rhythm, no murmurs  ABDOMEN: nontender to palpation, no rebound tenderness/guarding  EXTREMITIES: no edema on bilateral LE  NEUROLOGY: no focal neurologic deficit  SKIN: No rashes or lesions    LABS:  All labs personally Reviewed.    RADIOLOGY & ADDITIONAL TESTS:  All imaging personally Reviewed.  Luciano Partida PGY1  pager [phone] or check resident tab for coverage    Patient is a 82y old  Female who presents with a chief complaint of acute cholecystitis, DKA (03 Feb 2023 18:25)    SUBJECTIVE / OVERNIGHT EVENTS:    NAEO.  Patient this morning is doing well.   Feels right abdominal pain is slightly improved.  Is able to tolerate PO without issue.  Continues to feel mild dyspnea but not worse over the last two weeks.  Hematuria is resolved.    Brief Daily Plan:  ·	consider discharge planning  ·	c/w antibiotics per ID  ·	appreciate all consulted services    MEDICATIONS  MEDICATIONS  (STANDING):  aspirin  chewable 81 milliGRAM(s) Oral daily  atorvastatin 40 milliGRAM(s) Oral at bedtime  buMETAnide 1 milliGRAM(s) Oral daily  chlorhexidine 4% Liquid 1 Application(s) Topical <User Schedule>  dextrose 5%. 1000 milliLiter(s) (100 mL/Hr) IV Continuous <Continuous>  dextrose 5%. 1000 milliLiter(s) (50 mL/Hr) IV Continuous <Continuous>  dextrose 50% Injectable 25 Gram(s) IV Push once  dextrose 50% Injectable 12.5 Gram(s) IV Push once  dextrose 50% Injectable 25 Gram(s) IV Push once  fluconAZOLE   Tablet 200 milliGRAM(s) Oral every 24 hours  gabapentin 200 milliGRAM(s) Oral daily  glucagon  Injectable 1 milliGRAM(s) IntraMuscular once  insulin glargine Injectable (LANTUS) 10 Unit(s) SubCutaneous at bedtime  insulin lispro (ADMELOG) corrective regimen sliding scale   SubCutaneous at bedtime  insulin lispro (ADMELOG) corrective regimen sliding scale   SubCutaneous three times a day before meals  insulin lispro Injectable (ADMELOG) 7 Unit(s) SubCutaneous three times a day before meals  lidocaine 2% Viscous 15 milliLiter(s) Oral four times a day  metoprolol tartrate 100 milliGRAM(s) Oral every 12 hours  piperacillin/tazobactam IVPB.. 3.375 Gram(s) IV Intermittent every 12 hours  sodium bicarbonate 650 milliGRAM(s) Oral two times a day    MEDICATIONS  (PRN):  acetaminophen     Tablet .. 650 milliGRAM(s) Oral every 6 hours PRN Mild Pain (1 - 3), Moderate Pain (4 - 6)  albuterol/ipratropium for Nebulization 3 milliLiter(s) Nebulizer every 6 hours PRN Shortness of Breath and/or Wheezing  aluminum hydroxide/magnesium hydroxide/simethicone Suspension 30 milliLiter(s) Oral every 6 hours PRN Dyspepsia  dextrose Oral Gel 15 Gram(s) Oral once PRN Blood Glucose LESS THAN 70 milliGRAM(s)/deciliter  sodium chloride 0.9% lock flush 10 milliLiter(s) IV Push every 1 hour PRN Pre/post blood products, medications, blood draw, and to maintain line patency      VITALS  Vital Signs Last 24 Hrs  T(C): 36.4 (04 Feb 2023 05:00), Max: 37.1 (03 Feb 2023 21:55)  T(F): 97.6 (04 Feb 2023 05:00), Max: 98.7 (03 Feb 2023 21:55)  HR: 66 (04 Feb 2023 05:00) (64 - 70)  BP: 112/75 (04 Feb 2023 05:00) (112/75 - 141/84)  BP(mean): --  RR: 18 (04 Feb 2023 05:00) (18 - 18)  SpO2: 98% (04 Feb 2023 05:00) (97% - 100%)    Parameters below as of 04 Feb 2023 05:00  Patient On (Oxygen Delivery Method): nasal cannula  O2 Flow (L/min): 1      PHYSICAL EXAM:  GENERAL: no distress, sitting upright eating breakfast  PSYCH: A&O x3  HEAD: Atraumatic, Normocephalic  NECK: Supple, No JVD  CHEST/LUNG: trace crackles at bases  HEART: regular rate and rhythm, no murmurs  ABDOMEN: moderately tender to palpation in RLQ RUQ  EXTREMITIES: trace edema  NEUROLOGY: no focal neurologic deficit  SKIN: No rashes or lesions    LABS:  All labs personally Reviewed.    RADIOLOGY & ADDITIONAL TESTS:  All imaging personally Reviewed.

## 2023-02-04 NOTE — PROGRESS NOTE ADULT - SUBJECTIVE AND OBJECTIVE BOX
EP    Date of service 2/4    no events overnight          acetaminophen     Tablet .. 650 milliGRAM(s) Oral every 6 hours PRN  albuterol/ipratropium for Nebulization 3 milliLiter(s) Nebulizer every 6 hours PRN  aluminum hydroxide/magnesium hydroxide/simethicone Suspension 30 milliLiter(s) Oral every 6 hours PRN  apixaban 2.5 milliGRAM(s) Oral two times a day  aspirin  chewable 81 milliGRAM(s) Oral daily  atorvastatin 40 milliGRAM(s) Oral at bedtime  buMETAnide 1 milliGRAM(s) Oral daily  chlorhexidine 4% Liquid 1 Application(s) Topical <User Schedule>  dextrose 5%. 1000 milliLiter(s) IV Continuous <Continuous>  dextrose 5%. 1000 milliLiter(s) IV Continuous <Continuous>  dextrose 50% Injectable 25 Gram(s) IV Push once  dextrose 50% Injectable 12.5 Gram(s) IV Push once  dextrose 50% Injectable 25 Gram(s) IV Push once  dextrose Oral Gel 15 Gram(s) Oral once PRN  fluconAZOLE   Tablet 200 milliGRAM(s) Oral every 24 hours  gabapentin 200 milliGRAM(s) Oral daily  glucagon  Injectable 1 milliGRAM(s) IntraMuscular once  insulin glargine Injectable (LANTUS) 21 Unit(s) SubCutaneous at bedtime  insulin lispro (ADMELOG) corrective regimen sliding scale   SubCutaneous at bedtime  insulin lispro (ADMELOG) corrective regimen sliding scale   SubCutaneous three times a day before meals  insulin lispro Injectable (ADMELOG) 7 Unit(s) SubCutaneous three times a day before meals  lidocaine 2% Viscous 15 milliLiter(s) Oral four times a day  metoprolol tartrate 100 milliGRAM(s) Oral every 12 hours  piperacillin/tazobactam IVPB.. 3.375 Gram(s) IV Intermittent every 12 hours  sodium bicarbonate 650 milliGRAM(s) Oral two times a day  sodium chloride 0.9% lock flush 10 milliLiter(s) IV Push every 1 hour PRN                            9.6    12.04 )-----------( 452      ( 04 Feb 2023 07:24 )             31.7       Hemoglobin: 9.6 g/dL (02-04 @ 07:24)  Hemoglobin: 9.1 g/dL (02-03 @ 09:05)  Hemoglobin: 9.4 g/dL (02-02 @ 07:14)  Hemoglobin: 9.9 g/dL (02-01 @ 07:15)  Hemoglobin: 10.3 g/dL (01-31 @ 06:01)      02-04    139  |  105  |  34<H>  ----------------------------<  274<H>  4.0   |  22  |  2.34<H>    Ca    8.9      04 Feb 2023 07:25  Phos  4.1     02-04  Mg     2.0     02-04    TPro  6.8  /  Alb  3.0<L>  /  TBili  0.1<L>  /  DBili  x   /  AST  39  /  ALT  26  /  AlkPhos  269<H>  02-04    Creatinine Trend: 2.34<--, 2.59<--, 2.87<--, 3.41<--, 3.45<--, 3.51<--    COAGS:           T(C): 36.6 (02-04-23 @ 10:07), Max: 37.1 (02-03-23 @ 21:55)  HR: 79 (02-04-23 @ 10:07) (64 - 79)  BP: 145/74 (02-04-23 @ 10:07) (112/75 - 145/74)  RR: 18 (02-04-23 @ 10:07) (18 - 18)  SpO2: 98% (02-04-23 @ 10:07) (97% - 100%)  Wt(kg): --    I&O's Summary    03 Feb 2023 07:01  -  04 Feb 2023 07:00  --------------------------------------------------------  IN: 980 mL / OUT: 1400 mL / NET: -420 mL    04 Feb 2023 07:01  -  04 Feb 2023 10:26  --------------------------------------------------------  IN: 160 mL / OUT: 400 mL / NET: -240 mL      Gen: Appears fatigued but A&O x 3  HEENT:  (-)icterus (-)pallor  CV: N S1 S2 1/6 JAKY (+)2 Pulses B/l  Resp:  Clear to ausculatation B/L, normal effort  GI: (+) BS Soft, NT, ND  Lymph:  (-)Edema, (-)obvious lymphadenopathy  Skin: Warm to touch, Normal turgor  Psych: Appropriate mood and affect    TELEMETRY: 	SR    < from: TTE with Doppler (w/Cont) (09.09.22 @ 09:49) >  Conclusions:  1. Mild mitral annular calcification. Mitral annular  dilatation with normal leaflet opening. Mild mitral  regurgitation.  2. Aortic valve not well visualized; appears calcified.  Minimal aortic regurgitation.  3. Endocardial visualization enhanced with intravenous  injection of Ultrasonic Enhancing Agent (Definity). Left  ventricle suboptimally visualized despite intravenous  ultrasonic enhancing agent; the apex is foreshortened on  apical images. Overall normal left ventricular systolic  function.  4. Increased E/e' suggestive of elevated left ventricular  filling pressure.  5. The right ventricle is not well visualized; grossly  normal right ventricular size and systolic function.  *** Compared with echocardiogram of 1/11/2022, increased  E/e' suggestive of increased LV filling pressure is noted  on today's study.  ------------------------------------------------------------------------  Confirmed on  9/9/2022 - 12:08:11 by Bridgett Edmond M.D.    < end of copied text >      ASSESSMENT/PLAN: 	Patient is a 80 year old F with a PMHx of HTN, HLD, T2DM, HFpEF (EF 65%), breast cancer s/p R partial mastectomy, rectal carcinoma s/p resection, CAD s/p CABG (11/2020) who presents for 1 day of progressively worsening abdominal pain associated with nausea and NBNB vomiting.  Found to have acute rissa, s/p ERCP and stent and Perc rissa drain placed.  EP called for 2 episodes of tachycardia causing RRT.  Pt received Adenosine 6mg x 1 each time which slowed HR down.     -continue eliquis for lifelong anticoagulation for stroke prevention  -continue metoprolol 100mg BID.  so far, effective.  -If AT/AFib/AFL recurs will start amiodarone  -poor candidate for ablation at this time  EP    Date of service 2/4    no events overnight     acetaminophen     Tablet .. 650 milliGRAM(s) Oral every 6 hours PRN  albuterol/ipratropium for Nebulization 3 milliLiter(s) Nebulizer every 6 hours PRN  aluminum hydroxide/magnesium hydroxide/simethicone Suspension 30 milliLiter(s) Oral every 6 hours PRN  apixaban 2.5 milliGRAM(s) Oral two times a day  aspirin  chewable 81 milliGRAM(s) Oral daily  atorvastatin 40 milliGRAM(s) Oral at bedtime  buMETAnide 1 milliGRAM(s) Oral daily  chlorhexidine 4% Liquid 1 Application(s) Topical <User Schedule>  dextrose 5%. 1000 milliLiter(s) IV Continuous <Continuous>  dextrose 5%. 1000 milliLiter(s) IV Continuous <Continuous>  dextrose 50% Injectable 25 Gram(s) IV Push once  dextrose 50% Injectable 12.5 Gram(s) IV Push once  dextrose 50% Injectable 25 Gram(s) IV Push once  dextrose Oral Gel 15 Gram(s) Oral once PRN  fluconAZOLE   Tablet 200 milliGRAM(s) Oral every 24 hours  gabapentin 200 milliGRAM(s) Oral daily  glucagon  Injectable 1 milliGRAM(s) IntraMuscular once  insulin glargine Injectable (LANTUS) 21 Unit(s) SubCutaneous at bedtime  insulin lispro (ADMELOG) corrective regimen sliding scale   SubCutaneous at bedtime  insulin lispro (ADMELOG) corrective regimen sliding scale   SubCutaneous three times a day before meals  insulin lispro Injectable (ADMELOG) 7 Unit(s) SubCutaneous three times a day before meals  lidocaine 2% Viscous 15 milliLiter(s) Oral four times a day  metoprolol tartrate 100 milliGRAM(s) Oral every 12 hours  piperacillin/tazobactam IVPB.. 3.375 Gram(s) IV Intermittent every 12 hours  sodium bicarbonate 650 milliGRAM(s) Oral two times a day  sodium chloride 0.9% lock flush 10 milliLiter(s) IV Push every 1 hour PRN                            9.6    12.04 )-----------( 452      ( 04 Feb 2023 07:24 )             31.7       Hemoglobin: 9.6 g/dL (02-04 @ 07:24)  Hemoglobin: 9.1 g/dL (02-03 @ 09:05)  Hemoglobin: 9.4 g/dL (02-02 @ 07:14)  Hemoglobin: 9.9 g/dL (02-01 @ 07:15)  Hemoglobin: 10.3 g/dL (01-31 @ 06:01)      02-04    139  |  105  |  34<H>  ----------------------------<  274<H>  4.0   |  22  |  2.34<H>    Ca    8.9      04 Feb 2023 07:25  Phos  4.1     02-04  Mg     2.0     02-04    TPro  6.8  /  Alb  3.0<L>  /  TBili  0.1<L>  /  DBili  x   /  AST  39  /  ALT  26  /  AlkPhos  269<H>  02-04    Creatinine Trend: 2.34<--, 2.59<--, 2.87<--, 3.41<--, 3.45<--, 3.51<--    COAGS:           T(C): 36.6 (02-04-23 @ 10:07), Max: 37.1 (02-03-23 @ 21:55)  HR: 79 (02-04-23 @ 10:07) (64 - 79)  BP: 145/74 (02-04-23 @ 10:07) (112/75 - 145/74)  RR: 18 (02-04-23 @ 10:07) (18 - 18)  SpO2: 98% (02-04-23 @ 10:07) (97% - 100%)  Wt(kg): --    I&O's Summary    03 Feb 2023 07:01  -  04 Feb 2023 07:00  --------------------------------------------------------  IN: 980 mL / OUT: 1400 mL / NET: -420 mL    04 Feb 2023 07:01  -  04 Feb 2023 10:26  --------------------------------------------------------  IN: 160 mL / OUT: 400 mL / NET: -240 mL      Gen: Appears fatigued but A&O x 3  HEENT:  (-)icterus (-)pallor  CV: N S1 S2 1/6 JAKY (+)2 Pulses B/l  Resp:  Clear to ausculatation B/L, normal effort  GI: (+) BS Soft, NT, ND  Lymph:  (-)Edema, (-)obvious lymphadenopathy  Skin: Warm to touch, Normal turgor  Psych: Appropriate mood and affect    TELEMETRY: 	SR    < from: TTE with Doppler (w/Cont) (09.09.22 @ 09:49) >  Conclusions:  1. Mild mitral annular calcification. Mitral annular  dilatation with normal leaflet opening. Mild mitral  regurgitation.  2. Aortic valve not well visualized; appears calcified.  Minimal aortic regurgitation.  3. Endocardial visualization enhanced with intravenous  injection of Ultrasonic Enhancing Agent (Definity). Left  ventricle suboptimally visualized despite intravenous  ultrasonic enhancing agent; the apex is foreshortened on  apical images. Overall normal left ventricular systolic  function.  4. Increased E/e' suggestive of elevated left ventricular  filling pressure.  5. The right ventricle is not well visualized; grossly  normal right ventricular size and systolic function.  *** Compared with echocardiogram of 1/11/2022, increased  E/e' suggestive of increased LV filling pressure is noted  on today's study.  ------------------------------------------------------------------------  Confirmed on  9/9/2022 - 12:08:11 by Bridgett Edmond M.D.    < end of copied text >      ASSESSMENT/PLAN: 	Patient is a 80 year old F with a PMHx of HTN, HLD, T2DM, HFpEF (EF 65%), breast cancer s/p R partial mastectomy, rectal carcinoma s/p resection, CAD s/p CABG (11/2020) who presents for 1 day of progressively worsening abdominal pain associated with nausea and NBNB vomiting.  Found to have acute rissa, s/p ERCP and stent and Perc rissa drain placed.  EP called for 2 episodes of tachycardia causing RRT.  Pt received Adenosine 6mg x 1 each time which slowed HR down.    -continue eliquis for lifelong anticoagulation for stroke prevention  -continue metoprolol 100mg BID.  so far, effective.  -If AT/AFib/AFL recurs will start amiodarone  -poor candidate for ablation at this time

## 2023-02-04 NOTE — PROGRESS NOTE ADULT - ASSESSMENT
82F history of HTN, HLD, DM2, HFpEF (EF 65%), CAD s/p CABG, Breast Ca s/p R partial mastectomy, Rectal Ca s/p resection presenting with abdominal pain x 1 week found to have acute cholecystitis, not a surgical candidate. S/p ERCP with stent placement course c/b gallbladder perforation s/p perc rissa w/ fluid c/f candida and GPCs on zosyn, fluconazole; perc rissa dislodged on 2/1/23. 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 New hematuria 2/2/23, urology consulted.    82F history of HTN, HLD, DM2, HFpEF (EF 65%), CAD s/p CABG, Breast Ca s/p R partial mastectomy, Rectal Ca s/p resection presenting with abdominal pain x 1 week found to have acute cholecystitis, not a surgical candidate. S/p ERCP with stent placement course c/b gallbladder perforation s/p perc rissa w/ fluid c/f candida and GPCs on zosyn, fluconazole; perc rissa dislodged on 2/1/23. 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 New hematuria 2/2/23, resolved on 2/4/23, eliquis resumed.

## 2023-02-05 LAB
ALBUMIN SERPL ELPH-MCNC: 2.8 G/DL — LOW (ref 3.3–5)
ALP SERPL-CCNC: 281 U/L — HIGH (ref 40–120)
ALT FLD-CCNC: 39 U/L — SIGNIFICANT CHANGE UP (ref 10–45)
ANION GAP SERPL CALC-SCNC: 13 MMOL/L — SIGNIFICANT CHANGE UP (ref 5–17)
AST SERPL-CCNC: 58 U/L — HIGH (ref 10–40)
BILIRUB SERPL-MCNC: 0.2 MG/DL — SIGNIFICANT CHANGE UP (ref 0.2–1.2)
BUN SERPL-MCNC: 31 MG/DL — HIGH (ref 7–23)
CALCIUM SERPL-MCNC: 9.2 MG/DL — SIGNIFICANT CHANGE UP (ref 8.4–10.5)
CHLORIDE SERPL-SCNC: 103 MMOL/L — SIGNIFICANT CHANGE UP (ref 96–108)
CO2 SERPL-SCNC: 22 MMOL/L — SIGNIFICANT CHANGE UP (ref 22–31)
CREAT SERPL-MCNC: 2.3 MG/DL — HIGH (ref 0.5–1.3)
EGFR: 20 ML/MIN/1.73M2 — LOW
GLUCOSE BLDC GLUCOMTR-MCNC: 159 MG/DL — HIGH (ref 70–99)
GLUCOSE BLDC GLUCOMTR-MCNC: 163 MG/DL — HIGH (ref 70–99)
GLUCOSE BLDC GLUCOMTR-MCNC: 166 MG/DL — HIGH (ref 70–99)
GLUCOSE BLDC GLUCOMTR-MCNC: 173 MG/DL — HIGH (ref 70–99)
GLUCOSE BLDC GLUCOMTR-MCNC: 184 MG/DL — HIGH (ref 70–99)
GLUCOSE SERPL-MCNC: 146 MG/DL — HIGH (ref 70–99)
HCT VFR BLD CALC: 30 % — LOW (ref 34.5–45)
HGB BLD-MCNC: 9.3 G/DL — LOW (ref 11.5–15.5)
MCHC RBC-ENTMCNC: 29.4 PG — SIGNIFICANT CHANGE UP (ref 27–34)
MCHC RBC-ENTMCNC: 31 GM/DL — LOW (ref 32–36)
MCV RBC AUTO: 94.9 FL — SIGNIFICANT CHANGE UP (ref 80–100)
NRBC # BLD: 0 /100 WBCS — SIGNIFICANT CHANGE UP (ref 0–0)
PLATELET # BLD AUTO: 434 K/UL — HIGH (ref 150–400)
POTASSIUM SERPL-MCNC: 3.9 MMOL/L — SIGNIFICANT CHANGE UP (ref 3.5–5.3)
POTASSIUM SERPL-SCNC: 3.9 MMOL/L — SIGNIFICANT CHANGE UP (ref 3.5–5.3)
PROT SERPL-MCNC: 6.7 G/DL — SIGNIFICANT CHANGE UP (ref 6–8.3)
RBC # BLD: 3.16 M/UL — LOW (ref 3.8–5.2)
RBC # FLD: 16.2 % — HIGH (ref 10.3–14.5)
SODIUM SERPL-SCNC: 138 MMOL/L — SIGNIFICANT CHANGE UP (ref 135–145)
WBC # BLD: 9.93 K/UL — SIGNIFICANT CHANGE UP (ref 3.8–10.5)
WBC # FLD AUTO: 9.93 K/UL — SIGNIFICANT CHANGE UP (ref 3.8–10.5)

## 2023-02-05 PROCEDURE — 99232 SBSQ HOSP IP/OBS MODERATE 35: CPT | Mod: GC

## 2023-02-05 RX ADMIN — GABAPENTIN 200 MILLIGRAM(S): 400 CAPSULE ORAL at 12:00

## 2023-02-05 RX ADMIN — CHLORHEXIDINE GLUCONATE 1 APPLICATION(S): 213 SOLUTION TOPICAL at 08:37

## 2023-02-05 RX ADMIN — Medication 650 MILLIGRAM(S): at 05:18

## 2023-02-05 RX ADMIN — Medication 7 UNIT(S): at 18:08

## 2023-02-05 RX ADMIN — APIXABAN 2.5 MILLIGRAM(S): 2.5 TABLET, FILM COATED ORAL at 05:18

## 2023-02-05 RX ADMIN — LIDOCAINE 15 MILLILITER(S): 4 CREAM TOPICAL at 17:21

## 2023-02-05 RX ADMIN — Medication 7 UNIT(S): at 08:35

## 2023-02-05 RX ADMIN — BUMETANIDE 1 MILLIGRAM(S): 0.25 INJECTION INTRAMUSCULAR; INTRAVENOUS at 05:20

## 2023-02-05 RX ADMIN — Medication 2: at 18:08

## 2023-02-05 RX ADMIN — PIPERACILLIN AND TAZOBACTAM 25 GRAM(S): 4; .5 INJECTION, POWDER, LYOPHILIZED, FOR SOLUTION INTRAVENOUS at 05:17

## 2023-02-05 RX ADMIN — Medication 2: at 13:10

## 2023-02-05 RX ADMIN — LIDOCAINE 15 MILLILITER(S): 4 CREAM TOPICAL at 12:01

## 2023-02-05 RX ADMIN — Medication 100 MILLIGRAM(S): at 17:21

## 2023-02-05 RX ADMIN — Medication 2: at 08:36

## 2023-02-05 RX ADMIN — APIXABAN 2.5 MILLIGRAM(S): 2.5 TABLET, FILM COATED ORAL at 17:20

## 2023-02-05 RX ADMIN — ATORVASTATIN CALCIUM 40 MILLIGRAM(S): 80 TABLET, FILM COATED ORAL at 22:58

## 2023-02-05 RX ADMIN — Medication 81 MILLIGRAM(S): at 12:00

## 2023-02-05 RX ADMIN — LIDOCAINE 15 MILLILITER(S): 4 CREAM TOPICAL at 00:27

## 2023-02-05 RX ADMIN — Medication 650 MILLIGRAM(S): at 17:20

## 2023-02-05 RX ADMIN — Medication 7 UNIT(S): at 13:09

## 2023-02-05 RX ADMIN — Medication 100 MILLIGRAM(S): at 05:17

## 2023-02-05 RX ADMIN — LIDOCAINE 15 MILLILITER(S): 4 CREAM TOPICAL at 05:17

## 2023-02-05 RX ADMIN — FLUCONAZOLE 200 MILLIGRAM(S): 150 TABLET ORAL at 17:21

## 2023-02-05 RX ADMIN — INSULIN GLARGINE 21 UNIT(S): 100 INJECTION, SOLUTION SUBCUTANEOUS at 22:58

## 2023-02-05 RX ADMIN — PIPERACILLIN AND TAZOBACTAM 25 GRAM(S): 4; .5 INJECTION, POWDER, LYOPHILIZED, FOR SOLUTION INTRAVENOUS at 17:21

## 2023-02-05 NOTE — PROGRESS NOTE ADULT - SUBJECTIVE AND OBJECTIVE BOX
DATE OF SERVICE: 02-05-23 @ 14:27    Patient is a 82y old  Female who presents with a chief complaint of acute cholecystitis, DKA (05 Feb 2023 10:22)      INTERVAL HISTORY: Feels ok.      REVIEW OF SYSTEMS:  CONSTITUTIONAL: No weakness  EYES/ENT: No visual changes;  No throat pain   NECK: No pain or stiffness  RESPIRATORY: No cough, wheezing; No shortness of breath  CARDIOVASCULAR: No chest pain or palpitations  GASTROINTESTINAL: No abdominal  pain. No nausea, vomiting, or hematemesis  GENITOURINARY: No dysuria, frequency or hematuria  NEUROLOGICAL: No stroke like symptoms  SKIN: No rashes    TELEMETRY Personally reviewed: SR 60-70  	  MEDICATIONS:  buMETAnide 1 milliGRAM(s) Oral daily  metoprolol tartrate 100 milliGRAM(s) Oral every 12 hours        PHYSICAL EXAM:  T(C): 36.6 (02-05-23 @ 13:04), Max: 36.8 (02-05-23 @ 01:26)  HR: 66 (02-05-23 @ 13:04) (64 - 70)  BP: 158/79 (02-05-23 @ 13:04) (114/68 - 158/79)  RR: 18 (02-05-23 @ 13:04) (18 - 18)  SpO2: 100% (02-05-23 @ 13:04) (97% - 100%)  Wt(kg): --  I&O's Summary    04 Feb 2023 07:01  -  05 Feb 2023 07:00  --------------------------------------------------------  IN: 1020 mL / OUT: 1750 mL / NET: -730 mL    05 Feb 2023 07:01  -  05 Feb 2023 14:27  --------------------------------------------------------  IN: 240 mL / OUT: 0 mL / NET: 240 mL          Appearance: In no distress	  HEENT:    PERRL, EOMI	  Cardiovascular:  S1 S2, No JVD  Respiratory: Lungs clear to auscultation	  Gastrointestinal:  Soft, Non-tender, + BS	  Vascularature:  No edema of LE  Psychiatric: Appropriate affect   Neuro: no acute focal deficits                               9.3    9.93  )-----------( 434      ( 05 Feb 2023 07:23 )             30.0     02-05    138  |  103  |  31<H>  ----------------------------<  146<H>  3.9   |  22  |  2.30<H>    Ca    9.2      05 Feb 2023 07:21  Phos  4.1     02-04  Mg     2.0     02-04    TPro  6.7  /  Alb  2.8<L>  /  TBili  0.2  /  DBili  x   /  AST  58<H>  /  ALT  39  /  AlkPhos  281<H>  02-05        Labs personally reviewed      ASSESSMENT/PLAN: 	    Ms. Batista displays no signs or symptoms of acute coronary ischemia or decompensated CHF.  Admission EKG is sinus with pre-existing anteroseptal q-waves.  Recent echo noted- normal LV systolic function with no hemodynamically significant valvular disease.    #Decompensated diastolic CHF-  Patient with severe pulm pressures on echo  c/w Bumex 1mg PO daily  Appears euvolemic with improving Cr. Will cont to assess daily.     #CAD s/p CABG-  No signs of active ischemic.  c/w ASA and statin  - 1/26 with c/o epigastric burning a/b episodes of emesis. ECG performed, no ischemic changes noted.   -1/28 c/o chest pain and epigastric pain similar to 2 days ago. Repeat EKG with no ischemic changes. Trop wnl.  This is most likely noncardiac in nature given patient's recent cholecystitis     #Sepsis   - 2/2 acute cholecystitis   - S/p CBD stent  - s/p IR drain 1/25  - Afebrile, WBC downtrending  - s/p ERCP 1/20 w/ stent  - perc rissa 1/25 - dislodged 2/1  - abdominal fluid cx w/ GPCs and candida  - c/w zosyn, fluconazole per ID  - plan for repeat ERCP w/ stent removal in 2 - 3 months  - follow fever/WBC curve, if c/f sepsis IR to replace dislodged rissa tube.    #SVT  - 1/26 patent HR in 130s while sleeping. Patient missed am dose of Metoprolol 150mg PO.  - Resume Metoprolol  - Adenosine 6mg IV x1 with termination of SVT  - cont to monitor on tele  - 1/27 with recurrent SVT s/p adenosine today   - EP consulted. Likely A-Tach.   - HR has been stable. Metoprolol decreased to 100mg PO BID.         Delaney Vasquez, FABIOLA-NP   Sergey Winchester DO [facility]  Cardiovascular Medicine  [address] [place] [address], [address]  Available through call or text on [employer]  Office: [phone]

## 2023-02-05 NOTE — CHART NOTE - NSCHARTNOTESELECT_GEN_ALL_CORE
Chart Note - Interventional Radiology
Event Note
Interventional Radiology
Event Note
Event Note
GI/Event Note
IR
IR
IR Resident/Off Service Note
IR follow up/Event Note
Surgery

## 2023-02-05 NOTE — PROGRESS NOTE ADULT - ASSESSMENT
82F history of HTN, HLD, DM2, HFpEF (EF 65%), CAD s/p CABG, Breast Ca s/p R partial mastectomy, Rectal Ca s/p resection presenting with abdominal pain x 1 week found to have acute cholecystitis, not a surgical candidate. S/p ERCP with stent placement course c/b gallbladder perforation s/p perc rissa w/ fluid c/f candida and GPCs on zosyn, fluconazole; perc rissa dislodged on 2/1/23. 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 New hematuria 2/2/23, resolved on 2/4/23, eliquis resumed.

## 2023-02-05 NOTE — PROGRESS NOTE ADULT - SUBJECTIVE AND OBJECTIVE BOX
Internal Medicine   Janakelvin Harrison | PGY-2    OVERNIGHT EVENTS:      SUBJECTIVE:       MEDICATIONS  (STANDING):  apixaban 2.5 milliGRAM(s) Oral two times a day  aspirin  chewable 81 milliGRAM(s) Oral daily  atorvastatin 40 milliGRAM(s) Oral at bedtime  buMETAnide 1 milliGRAM(s) Oral daily  chlorhexidine 4% Liquid 1 Application(s) Topical <User Schedule>  dextrose 5%. 1000 milliLiter(s) (100 mL/Hr) IV Continuous <Continuous>  dextrose 5%. 1000 milliLiter(s) (50 mL/Hr) IV Continuous <Continuous>  dextrose 50% Injectable 25 Gram(s) IV Push once  dextrose 50% Injectable 12.5 Gram(s) IV Push once  dextrose 50% Injectable 25 Gram(s) IV Push once  fluconAZOLE   Tablet 200 milliGRAM(s) Oral every 24 hours  gabapentin 200 milliGRAM(s) Oral daily  glucagon  Injectable 1 milliGRAM(s) IntraMuscular once  insulin glargine Injectable (LANTUS) 21 Unit(s) SubCutaneous at bedtime  insulin lispro (ADMELOG) corrective regimen sliding scale   SubCutaneous at bedtime  insulin lispro (ADMELOG) corrective regimen sliding scale   SubCutaneous three times a day before meals  insulin lispro Injectable (ADMELOG) 7 Unit(s) SubCutaneous three times a day before meals  lidocaine 2% Viscous 15 milliLiter(s) Oral four times a day  metoprolol tartrate 100 milliGRAM(s) Oral every 12 hours  piperacillin/tazobactam IVPB.. 3.375 Gram(s) IV Intermittent every 12 hours  sodium bicarbonate 650 milliGRAM(s) Oral two times a day    MEDICATIONS  (PRN):  acetaminophen     Tablet .. 650 milliGRAM(s) Oral every 6 hours PRN Mild Pain (1 - 3), Moderate Pain (4 - 6)  albuterol/ipratropium for Nebulization 3 milliLiter(s) Nebulizer every 6 hours PRN Shortness of Breath and/or Wheezing  aluminum hydroxide/magnesium hydroxide/simethicone Suspension 30 milliLiter(s) Oral every 6 hours PRN Dyspepsia  dextrose Oral Gel 15 Gram(s) Oral once PRN Blood Glucose LESS THAN 70 milliGRAM(s)/deciliter  sodium chloride 0.9% lock flush 10 milliLiter(s) IV Push every 1 hour PRN Pre/post blood products, medications, blood draw, and to maintain line patency        T(F): 98.2 (02-05-23 @ 01:26), Max: 98.2 (02-05-23 @ 01:26)  HR: 66 (02-05-23 @ 01:26) (66 - 79)  BP: 114/68 (02-05-23 @ 01:26) (114/68 - 147/68)  BP(mean): --  RR: 18 (02-05-23 @ 01:26) (18 - 18)  SpO2: 97% (02-05-23 @ 01:26) (97% - 100%)    PHYSICAL EXAM:     GENERAL: NAD, lying in bed comfortably  HEAD:  Atraumatic, Normocephalic  EYES: EOMI, PERRLA, conjunctiva and sclera clear, no nystagmus noted  ENT: Moist mucous membranes,   NECK: Supple, No JVD, trachea midline  CHEST/LUNG: Clear to auscultation bilaterally; No rales, rhonchi, wheezing, or rubs. Unlabored respirations  HEART: Regular rate and rhythm; No murmurs, rubs, or gallops, normal S1/S2  ABDOMEN: normal bowel sounds; Soft, nontender, nondistended, no organomegaly   EXTREMITIES:  2+ Peripheral Pulses, brisk capillary refill. No clubbing, cyanosis, or edema  MSK: No gross deformities noted   Neurological:  A&Ox3, no focal deficits   SKIN: No rashes or lesions  PSYCH: Normal mood, affect     TELEMETRY:    LABS:                        9.6    12.04 )-----------( 452      ( 04 Feb 2023 07:24 )             31.7     02-04    139  |  105  |  34<H>  ----------------------------<  274<H>  4.0   |  22  |  2.34<H>    Ca    8.9      04 Feb 2023 07:25  Phos  4.1     02-04  Mg     2.0     02-04    TPro  6.8  /  Alb  3.0<L>  /  TBili  0.1<L>  /  DBili  x   /  AST  39  /  ALT  26  /  AlkPhos  269<H>  02-04            Creatinine Trend: 2.34<--, 2.59<--, 2.87<--, 3.41<--, 3.45<--, 3.51<--  I&O's Summary    04 Feb 2023 07:01  -  05 Feb 2023 07:00  --------------------------------------------------------  IN: 1020 mL / OUT: 1750 mL / NET: -730 mL      BNP    RADIOLOGY & ADDITIONAL STUDIES:             Internal Medicine   Jared Harrison | PGY-2    OVERNIGHT EVENTS: No overnight events    Tele: NSR [id number]    : [id number]  SUBJECTIVE: No complaints. Denies any fever, chills, chest pain, dyspnea, palpitations. Abdominal pain in RUQ improved with mild back pain as well which she contributes to being in bed.      MEDICATIONS  (STANDING):  apixaban 2.5 milliGRAM(s) Oral two times a day  aspirin  chewable 81 milliGRAM(s) Oral daily  atorvastatin 40 milliGRAM(s) Oral at bedtime  buMETAnide 1 milliGRAM(s) Oral daily  chlorhexidine 4% Liquid 1 Application(s) Topical <User Schedule>  dextrose 5%. 1000 milliLiter(s) (100 mL/Hr) IV Continuous <Continuous>  dextrose 5%. 1000 milliLiter(s) (50 mL/Hr) IV Continuous <Continuous>  dextrose 50% Injectable 25 Gram(s) IV Push once  dextrose 50% Injectable 12.5 Gram(s) IV Push once  dextrose 50% Injectable 25 Gram(s) IV Push once  fluconAZOLE   Tablet 200 milliGRAM(s) Oral every 24 hours  gabapentin 200 milliGRAM(s) Oral daily  glucagon  Injectable 1 milliGRAM(s) IntraMuscular once  insulin glargine Injectable (LANTUS) 21 Unit(s) SubCutaneous at bedtime  insulin lispro (ADMELOG) corrective regimen sliding scale   SubCutaneous at bedtime  insulin lispro (ADMELOG) corrective regimen sliding scale   SubCutaneous three times a day before meals  insulin lispro Injectable (ADMELOG) 7 Unit(s) SubCutaneous three times a day before meals  lidocaine 2% Viscous 15 milliLiter(s) Oral four times a day  metoprolol tartrate 100 milliGRAM(s) Oral every 12 hours  piperacillin/tazobactam IVPB.. 3.375 Gram(s) IV Intermittent every 12 hours  sodium bicarbonate 650 milliGRAM(s) Oral two times a day    MEDICATIONS  (PRN):  acetaminophen     Tablet .. 650 milliGRAM(s) Oral every 6 hours PRN Mild Pain (1 - 3), Moderate Pain (4 - 6)  albuterol/ipratropium for Nebulization 3 milliLiter(s) Nebulizer every 6 hours PRN Shortness of Breath and/or Wheezing  aluminum hydroxide/magnesium hydroxide/simethicone Suspension 30 milliLiter(s) Oral every 6 hours PRN Dyspepsia  dextrose Oral Gel 15 Gram(s) Oral once PRN Blood Glucose LESS THAN 70 milliGRAM(s)/deciliter  sodium chloride 0.9% lock flush 10 milliLiter(s) IV Push every 1 hour PRN Pre/post blood products, medications, blood draw, and to maintain line patency        T(F): 98.2 (02-05-23 @ 01:26), Max: 98.2 (02-05-23 @ 01:26)  HR: 66 (02-05-23 @ 01:26) (66 - 79)  BP: 114/68 (02-05-23 @ 01:26) (114/68 - 147/68)  BP(mean): --  RR: 18 (02-05-23 @ 01:26) (18 - 18)  SpO2: 97% (02-05-23 @ 01:26) (97% - 100%)    PHYSICAL EXAM:     GENERAL: no distress, laying in bed comfortably resting  PSYCH: A&O x3  HEAD: Atraumatic, Normocephalic  NECK: Supple, No JVD  CHEST/LUNG: trace crackles at bases  HEART: regular rate and rhythm, no murmurs  ABDOMEN: moderately tender to palpation in RLQ RUQ  EXTREMITIES: trace edema  NEUROLOGY: no focal neurologic deficit  SKIN: No rashes or lesions    TELEMETRY:    LABS:                        9.6    12.04 )-----------( 452      ( 04 Feb 2023 07:24 )             31.7     02-04    139  |  105  |  34<H>  ----------------------------<  274<H>  4.0   |  22  |  2.34<H>    Ca    8.9      04 Feb 2023 07:25  Phos  4.1     02-04  Mg     2.0     02-04    TPro  6.8  /  Alb  3.0<L>  /  TBili  0.1<L>  /  DBili  x   /  AST  39  /  ALT  26  /  AlkPhos  269<H>  02-04            Creatinine Trend: 2.34<--, 2.59<--, 2.87<--, 3.41<--, 3.45<--, 3.51<--  I&O's Summary    04 Feb 2023 07:01  -  05 Feb 2023 07:00  --------------------------------------------------------  IN: 1020 mL / OUT: 1750 mL / NET: -730 mL      BNP    RADIOLOGY & ADDITIONAL STUDIES:

## 2023-02-05 NOTE — PROGRESS NOTE ADULT - SUBJECTIVE AND OBJECTIVE BOX
Surgery Progress Note     Subjective/24hour Events:   Patient seen and examined.   No acute events overnight.   Pain controlled.     Vital Signs:  Vital Signs Last 24 Hrs  T(C): 36.6 (05 Feb 2023 13:04), Max: 36.8 (05 Feb 2023 01:26)  T(F): 97.8 (05 Feb 2023 13:04), Max: 98.2 (05 Feb 2023 01:26)  HR: 66 (05 Feb 2023 13:04) (64 - 70)  BP: 158/79 (05 Feb 2023 13:04) (114/68 - 158/79)  BP(mean): --  RR: 18 (05 Feb 2023 13:04) (18 - 18)  SpO2: 100% (05 Feb 2023 13:04) (97% - 100%)    Parameters below as of 05 Feb 2023 13:04  Patient On (Oxygen Delivery Method): nasal cannula  O2 Flow (L/min): 1      CAPILLARY BLOOD GLUCOSE      POCT Blood Glucose.: 163 mg/dL (05 Feb 2023 13:01)  POCT Blood Glucose.: 159 mg/dL (05 Feb 2023 08:20)  POCT Blood Glucose.: 190 mg/dL (04 Feb 2023 21:19)  POCT Blood Glucose.: 213 mg/dL (04 Feb 2023 17:24)      I&O's Detail    04 Feb 2023 07:01  -  05 Feb 2023 07:00  --------------------------------------------------------  IN:    Oral Fluid: 1020 mL  Total IN: 1020 mL    OUT:    Voided (mL): 1750 mL  Total OUT: 1750 mL    Total NET: -730 mL      05 Feb 2023 07:01  -  05 Feb 2023 16:07  --------------------------------------------------------  IN:    Oral Fluid: 240 mL  Total IN: 240 mL    OUT:  Total OUT: 0 mL    Total NET: 240 mL          MEDICATIONS  (STANDING):  apixaban 2.5 milliGRAM(s) Oral two times a day  aspirin  chewable 81 milliGRAM(s) Oral daily  atorvastatin 40 milliGRAM(s) Oral at bedtime  buMETAnide 1 milliGRAM(s) Oral daily  chlorhexidine 4% Liquid 1 Application(s) Topical <User Schedule>  dextrose 5%. 1000 milliLiter(s) (50 mL/Hr) IV Continuous <Continuous>  dextrose 5%. 1000 milliLiter(s) (100 mL/Hr) IV Continuous <Continuous>  dextrose 50% Injectable 25 Gram(s) IV Push once  dextrose 50% Injectable 12.5 Gram(s) IV Push once  dextrose 50% Injectable 25 Gram(s) IV Push once  fluconAZOLE   Tablet 200 milliGRAM(s) Oral every 24 hours  gabapentin 200 milliGRAM(s) Oral daily  glucagon  Injectable 1 milliGRAM(s) IntraMuscular once  insulin glargine Injectable (LANTUS) 21 Unit(s) SubCutaneous at bedtime  insulin lispro (ADMELOG) corrective regimen sliding scale   SubCutaneous three times a day before meals  insulin lispro (ADMELOG) corrective regimen sliding scale   SubCutaneous at bedtime  insulin lispro Injectable (ADMELOG) 7 Unit(s) SubCutaneous three times a day before meals  lidocaine 2% Viscous 15 milliLiter(s) Oral four times a day  metoprolol tartrate 100 milliGRAM(s) Oral every 12 hours  piperacillin/tazobactam IVPB.. 3.375 Gram(s) IV Intermittent every 12 hours  sodium bicarbonate 650 milliGRAM(s) Oral two times a day    MEDICATIONS  (PRN):  acetaminophen     Tablet .. 650 milliGRAM(s) Oral every 6 hours PRN Mild Pain (1 - 3), Moderate Pain (4 - 6)  albuterol/ipratropium for Nebulization 3 milliLiter(s) Nebulizer every 6 hours PRN Shortness of Breath and/or Wheezing  aluminum hydroxide/magnesium hydroxide/simethicone Suspension 30 milliLiter(s) Oral every 6 hours PRN Dyspepsia  dextrose Oral Gel 15 Gram(s) Oral once PRN Blood Glucose LESS THAN 70 milliGRAM(s)/deciliter  sodium chloride 0.9% lock flush 10 milliLiter(s) IV Push every 1 hour PRN Pre/post blood products, medications, blood draw, and to maintain line patency      Physical Exam:  Gen: NAD.  Lungs: Non labored breathing.   Ab: Soft, nontender, nondistended.   :voiding freely, clear yellow    Labs:    02-05    138  |  103  |  31<H>  ----------------------------<  146<H>  3.9   |  22  |  2.30<H>    Ca    9.2      05 Feb 2023 07:21  Phos  4.1     02-04  Mg     2.0     02-04    TPro  6.7  /  Alb  2.8<L>  /  TBili  0.2  /  DBili  x   /  AST  58<H>  /  ALT  39  /  AlkPhos  281<H>  02-05    LIVER FUNCTIONS - ( 05 Feb 2023 07:21 )  Alb: 2.8 g/dL / Pro: 6.7 g/dL / ALK PHOS: 281 U/L / ALT: 39 U/L / AST: 58 U/L / GGT: x                                 9.3    9.93  )-----------( 434      ( 05 Feb 2023 07:23 )             30.0          Surgery Progress Note     Subjective/24hour Events:   Patient seen and examined.   No acute events overnight.   Pain controlled.     Vital Signs:  Vital Signs Last 24 Hrs  T(C): 36.6 (05 Feb 2023 13:04), Max: 36.8 (05 Feb 2023 01:26)  T(F): 97.8 (05 Feb 2023 13:04), Max: 98.2 (05 Feb 2023 01:26)  HR: 66 (05 Feb 2023 13:04) (64 - 70)  BP: 158/79 (05 Feb 2023 13:04) (114/68 - 158/79)  BP(mean): --  RR: 18 (05 Feb 2023 13:04) (18 - 18)  SpO2: 100% (05 Feb 2023 13:04) (97% - 100%)    Parameters below as of 05 Feb 2023 13:04  Patient On (Oxygen Delivery Method): nasal cannula  O2 Flow (L/min): 1      CAPILLARY BLOOD GLUCOSE      POCT Blood Glucose.: 163 mg/dL (05 Feb 2023 13:01)  POCT Blood Glucose.: 159 mg/dL (05 Feb 2023 08:20)  POCT Blood Glucose.: 190 mg/dL (04 Feb 2023 21:19)  POCT Blood Glucose.: 213 mg/dL (04 Feb 2023 17:24)      I&O's Detail    04 Feb 2023 07:01  -  05 Feb 2023 07:00  --------------------------------------------------------  IN:    Oral Fluid: 1020 mL  Total IN: 1020 mL    OUT:    Voided (mL): 1750 mL  Total OUT: 1750 mL    Total NET: -730 mL      05 Feb 2023 07:01  -  05 Feb 2023 16:07  --------------------------------------------------------  IN:    Oral Fluid: 240 mL  Total IN: 240 mL    OUT:  Total OUT: 0 mL    Total NET: 240 mL          MEDICATIONS  (STANDING):  apixaban 2.5 milliGRAM(s) Oral two times a day  aspirin  chewable 81 milliGRAM(s) Oral daily  atorvastatin 40 milliGRAM(s) Oral at bedtime  buMETAnide 1 milliGRAM(s) Oral daily  chlorhexidine 4% Liquid 1 Application(s) Topical <User Schedule>  dextrose 5%. 1000 milliLiter(s) (50 mL/Hr) IV Continuous <Continuous>  dextrose 5%. 1000 milliLiter(s) (100 mL/Hr) IV Continuous <Continuous>  dextrose 50% Injectable 25 Gram(s) IV Push once  dextrose 50% Injectable 12.5 Gram(s) IV Push once  dextrose 50% Injectable 25 Gram(s) IV Push once  fluconAZOLE   Tablet 200 milliGRAM(s) Oral every 24 hours  gabapentin 200 milliGRAM(s) Oral daily  glucagon  Injectable 1 milliGRAM(s) IntraMuscular once  insulin glargine Injectable (LANTUS) 21 Unit(s) SubCutaneous at bedtime  insulin lispro (ADMELOG) corrective regimen sliding scale   SubCutaneous three times a day before meals  insulin lispro (ADMELOG) corrective regimen sliding scale   SubCutaneous at bedtime  insulin lispro Injectable (ADMELOG) 7 Unit(s) SubCutaneous three times a day before meals  lidocaine 2% Viscous 15 milliLiter(s) Oral four times a day  metoprolol tartrate 100 milliGRAM(s) Oral every 12 hours  piperacillin/tazobactam IVPB.. 3.375 Gram(s) IV Intermittent every 12 hours  sodium bicarbonate 650 milliGRAM(s) Oral two times a day    MEDICATIONS  (PRN):  acetaminophen     Tablet .. 650 milliGRAM(s) Oral every 6 hours PRN Mild Pain (1 - 3), Moderate Pain (4 - 6)  albuterol/ipratropium for Nebulization 3 milliLiter(s) Nebulizer every 6 hours PRN Shortness of Breath and/or Wheezing  aluminum hydroxide/magnesium hydroxide/simethicone Suspension 30 milliLiter(s) Oral every 6 hours PRN Dyspepsia  dextrose Oral Gel 15 Gram(s) Oral once PRN Blood Glucose LESS THAN 70 milliGRAM(s)/deciliter  sodium chloride 0.9% lock flush 10 milliLiter(s) IV Push every 1 hour PRN Pre/post blood products, medications, blood draw, and to maintain line patency      Physical Exam:  Gen: NAD.  Lungs: Non labored breathing.   Ab: Soft, nontender, nondistended.   :voiding freely, clear yellow    Labs:    02-05    138  |  103  |  31<H>  ----------------------------<  146<H>  3.9   |  22  |  2.30<H>    Ca    9.2      05 Feb 2023 07:21  Phos  4.1     02-04  Mg     2.0     02-04    TPro  6.7  /  Alb  2.8<L>  /  TBili  0.2  /  DBili  x   /  AST  58<H>  /  ALT  39  /  AlkPhos  281<H>  02-05    LIVER FUNCTIONS - ( 05 Feb 2023 07:21 )  Alb: 2.8 g/dL / Pro: 6.7 g/dL / ALK PHOS: 281 U/L / ALT: 39 U/L / AST: 58 U/L / GGT: x                                 9.3    9.93  )-----------( 434      ( 05 Feb 2023 07:23 )                 30.0       < from: CT Abdomen and Pelvis No Cont (02.03.23 @ 10:10) >   CT ABDOMEN AND PELVIS   ORDERED BY:  LINDSEY EISENBERG     PROCEDURE DATE:  02/03/2023          INTERPRETATION:  CLINICAL INFORMATION: Sepsis. Abdominal pain.    COMPARISON: CT abdomen and pelvis 1/31/2023    CONTRAST/COMPLICATIONS:  IV Contrast: NONE  Oral Contrast: NONE  Complications: None reported at time of study completion    PROCEDURE:  CT of the Abdomen and Pelvis was performed.  Sagittal and coronal reformats were performed.    FINDINGS: Evaluation is less sensitive without IV contrast.  LOWER CHEST: Right mastectomy. Median sternotomy. Cardiomegaly. Coronary   artery calcification.  Hypodensity of the blood pool in relation to left ventricular myocardium   suggests underlying anemia.  Small hiatal hernia. Trace pleural effusions. Calcified pulmonary   granulomata    LIVER: Mild focal fat deposition adjacent to the falciform ligament.  BILE DUCTS: Stable position of the plastic distal common bile duct stent.   Mild central intrahepatic biliary dilation and proximalextrahepatic bile   duct dilation upstream of the stent  GALLBLADDER: Nondistended. Contains variably sized gallstones. Removal of   prior percutaneous cholecystostomy tube. Low insertion of cystic duct   contains multiple calcified gallstones.  Wallthickening of the gallbladder neck. Pericholecystic inflammation   surrounds the gallbladder neck and cystic duct.  Mild decrease in size of the collection located posterior and inferior to   the cystic duct along the lateral aspect of proximal duodenum now   approximately measuring 3.7 x 3 cm, previously 4.9 x 4.4 cm. Mild   interval decrease in size of the right anterior pararenal loculated   complex fluid which extends posterior laterally to the right paracolic   gutter. Trace fluid and inflammation extends to the third portion of   duodenum. No extraluminal air  SPLEEN: Thin peripheral calcification  PANCREAS: Fatty infiltrated pancreatic head. No main duct dilation  ADRENALS: Within normal limits.  KIDNEYS/URETERS: Severe chronic right ureteropelvic junction obstruction   with atrophy. No left hydronephrosis or renal calculus.    BLADDER: Partially obscured by right hip metallic hardware artifact.  REPRODUCTIVE ORGANS: Atrophic uterus contains small calcified myomata.   Atrophic ovaries    BOWEL: LAR. Stable presacral thickening. Likely taken down ileostomy.   Unobstructed bowel. Duodenum described above with regard to gallbladder.  PERITONEUM: No ascites.. No free air  VESSELS: Atherosclerotic changes.  RETROPERITONEUM/LYMPH NODES: No lymphadenopathy.  ABDOMINAL WALL: Tiny fat-containing umbilical hernia. Infraumbilical   adjacent fat-containing hernias. Anasarca.  BONES: Multilevel degenerative changes throughout the spine. Right total   hip arthroplasty. Remote healed rib fractures.    IMPRESSION:    Cholelithiasis with multiple small calcified gallstones within the low   inserting cystic duct. Persistent wall thickening of the gallbladder   neck. Persistent pericholecystic inflammation surrounding gallbladder   neck and cystic duct. Mild decrease in size of the pericholecystic   probable biloma as described        --- End of Report ---            ROBERTO CARLOS SEGURA MD; Attending Radiologist  This document has been electronically signed. Feb  3 2023 12:20PM    < end of copied text >

## 2023-02-05 NOTE — PROGRESS NOTE ADULT - ATTENDING COMMENTS
seen earlier this AM  I agree with the above with the following additions and exceptions:  No ON events, At times get RUQ pain, no f/c, cp, sob    hx of acute cholecystitis, poor surgical candidate s/p PCT now dislodged,   -some ongoing abd pain with improving leukocytosis  - HIDA scan negative for acute rissa  -per IR no plans to reinsert drain,   -per ID will continue zosyn and flucon for now- likely to need prolonged course.   -trend cbc/cmp    Urinary retn: urology following, s/p TOV, rec PVR     CKD cr at baseline  afib: c/w a/c and betablocker for EP/cards

## 2023-02-05 NOTE — PROGRESS NOTE ADULT - SUBJECTIVE AND OBJECTIVE BOX
EP    Date of service 2/5    no events overnight     acetaminophen     Tablet .. 650 milliGRAM(s) Oral every 6 hours PRN  albuterol/ipratropium for Nebulization 3 milliLiter(s) Nebulizer every 6 hours PRN  aluminum hydroxide/magnesium hydroxide/simethicone Suspension 30 milliLiter(s) Oral every 6 hours PRN  apixaban 2.5 milliGRAM(s) Oral two times a day  aspirin  chewable 81 milliGRAM(s) Oral daily  atorvastatin 40 milliGRAM(s) Oral at bedtime  buMETAnide 1 milliGRAM(s) Oral daily  chlorhexidine 4% Liquid 1 Application(s) Topical <User Schedule>  dextrose 5%. 1000 milliLiter(s) IV Continuous <Continuous>  dextrose 5%. 1000 milliLiter(s) IV Continuous <Continuous>  dextrose 50% Injectable 25 Gram(s) IV Push once  dextrose 50% Injectable 12.5 Gram(s) IV Push once  dextrose 50% Injectable 25 Gram(s) IV Push once  dextrose Oral Gel 15 Gram(s) Oral once PRN  fluconAZOLE   Tablet 200 milliGRAM(s) Oral every 24 hours  gabapentin 200 milliGRAM(s) Oral daily  glucagon  Injectable 1 milliGRAM(s) IntraMuscular once  insulin glargine Injectable (LANTUS) 21 Unit(s) SubCutaneous at bedtime  insulin lispro (ADMELOG) corrective regimen sliding scale   SubCutaneous three times a day before meals  insulin lispro (ADMELOG) corrective regimen sliding scale   SubCutaneous at bedtime  insulin lispro Injectable (ADMELOG) 7 Unit(s) SubCutaneous three times a day before meals  lidocaine 2% Viscous 15 milliLiter(s) Oral four times a day  metoprolol tartrate 100 milliGRAM(s) Oral every 12 hours  piperacillin/tazobactam IVPB.. 3.375 Gram(s) IV Intermittent every 12 hours  sodium bicarbonate 650 milliGRAM(s) Oral two times a day  sodium chloride 0.9% lock flush 10 milliLiter(s) IV Push every 1 hour PRN                            9.3    9.93  )-----------( 434      ( 05 Feb 2023 07:23 )             30.0       02-05    138  |  103  |  31<H>  ----------------------------<  146<H>  3.9   |  22  |  2.30<H>    Ca    9.2      05 Feb 2023 07:21  Phos  4.1     02-04  Mg     2.0     02-04    TPro  6.7  /  Alb  2.8<L>  /  TBili  0.2  /  DBili  x   /  AST  58<H>  /  ALT  39  /  AlkPhos  281<H>  02-05    T(C): 36.7 (02-05-23 @ 08:42), Max: 36.8 (02-05-23 @ 01:26)  HR: 64 (02-05-23 @ 08:42) (64 - 77)  BP: 154/77 (02-05-23 @ 08:42) (114/68 - 154/77)  RR: 18 (02-05-23 @ 08:42) (18 - 18)  SpO2: 98% (02-05-23 @ 08:42) (97% - 100%)  Wt(kg): --    I&O's Summary    04 Feb 2023 07:01  -  05 Feb 2023 07:00  --------------------------------------------------------  IN: 1020 mL / OUT: 1750 mL / NET: -730 mL    05 Feb 2023 07:01  -  05 Feb 2023 10:25  --------------------------------------------------------  IN: 240 mL / OUT: 0 mL / NET: 240 mL    Gen: Appears fatigued but A&O x 3  HEENT:  (-)icterus (-)pallor  CV: N S1 S2 1/6 JAKY (+)2 Pulses B/l  Resp:  Clear to ausculatation B/L, normal effort  GI: (+) BS Soft, NT, ND  Lymph:  (-)Edema, (-)obvious lymphadenopathy  Skin: Warm to touch, Normal turgor  Psych: Appropriate mood and affect    TELEMETRY: 	SR    < from: TTE with Doppler (w/Cont) (09.09.22 @ 09:49) >  Conclusions:  1. Mild mitral annular calcification. Mitral annular  dilatation with normal leaflet opening. Mild mitral  regurgitation.  2. Aortic valve not well visualized; appears calcified.  Minimal aortic regurgitation.  3. Endocardial visualization enhanced with intravenous  injection of Ultrasonic Enhancing Agent (Definity). Left  ventricle suboptimally visualized despite intravenous  ultrasonic enhancing agent; the apex is foreshortened on  apical images. Overall normal left ventricular systolic  function.  4. Increased E/e' suggestive of elevated left ventricular  filling pressure.  5. The right ventricle is not well visualized; grossly  normal right ventricular size and systolic function.  *** Compared with echocardiogram of 1/11/2022, increased  E/e' suggestive of increased LV filling pressure is noted  on today's study.  ------------------------------------------------------------------------  Confirmed on  9/9/2022 - 12:08:11 by Bridgett Edmond M.D.    < end of copied text >      ASSESSMENT/PLAN: 	Patient is a 80 year old F with a PMHx of HTN, HLD, T2DM, HFpEF (EF 65%), breast cancer s/p R partial mastectomy, rectal carcinoma s/p resection, CAD s/p CABG (11/2020) who presents for 1 day of progressively worsening abdominal pain associated with nausea and NBNB vomiting.  Found to have acute rissa, s/p ERCP and stent and Perc rissa drain placed.  EP called for 2 episodes of tachycardia causing RRT.  Pt received Adenosine 6mg x 1 each time which slowed HR down.    -continue eliquis for lifelong anticoagulation for stroke prevention  -continue metoprolol 100mg BID.  so far, effective.  -If AT/AFib/AFL recurs will start amiodarone  -poor candidate for ablation at this time     Ld Lowery M.D.  Cardiac Electrophysiology  [phone]

## 2023-02-05 NOTE — PROGRESS NOTE ADULT - PROBLEM SELECTOR PLAN 1
- presented with 1 week abdominal pain, nausea, vomiting  - initial CT A/P c/w cholecystitis  - per surgery, not a candidate given comorbidities  - s/p ERCP 1/20 w/ stent  - repeat CT A/P for recurrent abdominal pain c/f perforated gallbladder  - perc rissa 1/25 - dislodged 2/1  - abdominal fluid cx w/ GPCs and candida  - repeat CT A/P for recurrent abdominal pain with dislodged cholecystostomy tube and increased fluid collection  - HIDA 2/3 negative, patent cystic duct  - right abdomen TTP c/f bile peritonitis, non-operable per surgery    Plan:  - c/w zosyn, fluconazole per ID  - plan for repeat ERCP w/ stent removal in 2 - 3 months  - follow fever/WBC curve, if c/f sepsis IR to replace dislodged rissa tube  - abdominal pain improving

## 2023-02-05 NOTE — CHART NOTE - NSCHARTNOTEFT_GEN_A_CORE
IR Follow-Up     HIDA scan reviewed; negative = no acute cholecystitis, and no role for acute intervention.     --  Tyson Mckoy DO/LANIE  Interventional Radiology Resident (PGY-4)  Available on [employer] TEAMS    For EMERGENT inquiries/questions:  IR Pager ([facility]): [phone]  IR Pager (J): [phone] ; [phone]    For non-emergent consults/questions:   Please place a sunrise order "Consult- Interventional Radiology" with an appropriate callback number    For questions about scheduling during appropriate work hours, call IR :  [facility]: [phone]  [facility]: [phone]    For outpatient IR booking:  [facility]: [phone]  [facility]: [phone]

## 2023-02-05 NOTE — PROGRESS NOTE ADULT - ASSESSMENT
82y Female with PMHx of HTN, HLD, DM2, HFpEF (EF 65%), CAD s/p CABG, Breast ca s/p R partial mastectomy rectal Ca s/p resection, recurrent SVT controlled on metoprolol, new onset HSV admitted for abdominal pain, diagnosed as acute cholecystitis. Noted to have budding yeast like cells on prior UA (1/21). Urology consulted for blood-tinged urine in purewick container. Purewick in place, suctioning well, of note pt is urine and stool incontinent.  Straight catheterization performed at bedside 2/2 to confirm hematuria, urine appeared brown/maroon after straight cath, now on primafit appears to be clear.      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 Imaging reviewed with no hydronephrosis in non atrophic kidney, no indication for acute urological intervention.   - Continue abx and antifungals per ID   - Continue management of cholecystitis per primary team  - please check PVR    [facility]  [address] [place] [address] [phone]   82y Female with PMHx of HTN, HLD, DM2, HFpEF (EF 65%), CAD s/p CABG, Breast ca s/p R partial mastectomy rectal Ca s/p resection, recurrent SVT controlled on metoprolol, new onset HSV admitted for abdominal pain, diagnosed as acute cholecystitis. Noted to have budding yeast like cells on prior UA (1/21). Urology consulted for blood-tinged urine in purewick container. Purewick in place, suctioning well, of note pt is urine and stool incontinent.  Straight catheterization performed at bedside 2/2 to confirm hematuria, urine appeared brown/maroon after straight cath, now on primafit appears to be clear.      - CT reviewed, with findings c/w cholecystitis.  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 Montenegro for TOA in setting of functional solitary kidney, nephrology also following. Imaging reviewed with no hydronephrosis in non atrophic kidney, no indication for acute urological intervention.   - Continue abx and antifungals per ID   - Continue management of cholecystitis per primary team  - please check PVR    [facility]  [address] [place] [address] [phone]

## 2023-02-06 LAB
ALBUMIN SERPL ELPH-MCNC: 2.8 G/DL — LOW (ref 3.3–5)
ALP SERPL-CCNC: 531 U/L — HIGH (ref 40–120)
ALT FLD-CCNC: 67 U/L — HIGH (ref 10–45)
ANION GAP SERPL CALC-SCNC: 11 MMOL/L — SIGNIFICANT CHANGE UP (ref 5–17)
AST SERPL-CCNC: 107 U/L — HIGH (ref 10–40)
BILIRUB SERPL-MCNC: 0.2 MG/DL — SIGNIFICANT CHANGE UP (ref 0.2–1.2)
BUN SERPL-MCNC: 29 MG/DL — HIGH (ref 7–23)
CALCIUM SERPL-MCNC: 9.2 MG/DL — SIGNIFICANT CHANGE UP (ref 8.4–10.5)
CHLORIDE SERPL-SCNC: 101 MMOL/L — SIGNIFICANT CHANGE UP (ref 96–108)
CO2 SERPL-SCNC: 25 MMOL/L — SIGNIFICANT CHANGE UP (ref 22–31)
CREAT SERPL-MCNC: 2.42 MG/DL — HIGH (ref 0.5–1.3)
EGFR: 19 ML/MIN/1.73M2 — LOW
GLUCOSE BLDC GLUCOMTR-MCNC: 110 MG/DL — HIGH (ref 70–99)
GLUCOSE BLDC GLUCOMTR-MCNC: 139 MG/DL — HIGH (ref 70–99)
GLUCOSE BLDC GLUCOMTR-MCNC: 151 MG/DL — HIGH (ref 70–99)
GLUCOSE BLDC GLUCOMTR-MCNC: 172 MG/DL — HIGH (ref 70–99)
GLUCOSE SERPL-MCNC: 161 MG/DL — HIGH (ref 70–99)
HCT VFR BLD CALC: 29.4 % — LOW (ref 34.5–45)
HGB BLD-MCNC: 9.2 G/DL — LOW (ref 11.5–15.5)
MCHC RBC-ENTMCNC: 29.4 PG — SIGNIFICANT CHANGE UP (ref 27–34)
MCHC RBC-ENTMCNC: 31.3 GM/DL — LOW (ref 32–36)
MCV RBC AUTO: 93.9 FL — SIGNIFICANT CHANGE UP (ref 80–100)
NRBC # BLD: 0 /100 WBCS — SIGNIFICANT CHANGE UP (ref 0–0)
PLATELET # BLD AUTO: 395 K/UL — SIGNIFICANT CHANGE UP (ref 150–400)
POTASSIUM SERPL-MCNC: 4 MMOL/L — SIGNIFICANT CHANGE UP (ref 3.5–5.3)
POTASSIUM SERPL-SCNC: 4 MMOL/L — SIGNIFICANT CHANGE UP (ref 3.5–5.3)
PROT SERPL-MCNC: 6.6 G/DL — SIGNIFICANT CHANGE UP (ref 6–8.3)
RAPID RVP RESULT: SIGNIFICANT CHANGE UP
RBC # BLD: 3.13 M/UL — LOW (ref 3.8–5.2)
RBC # FLD: 16.4 % — HIGH (ref 10.3–14.5)
SARS-COV-2 RNA SPEC QL NAA+PROBE: SIGNIFICANT CHANGE UP
SODIUM SERPL-SCNC: 137 MMOL/L — SIGNIFICANT CHANGE UP (ref 135–145)
WBC # BLD: 11.23 K/UL — HIGH (ref 3.8–10.5)
WBC # FLD AUTO: 11.23 K/UL — HIGH (ref 3.8–10.5)

## 2023-02-06 PROCEDURE — 99232 SBSQ HOSP IP/OBS MODERATE 35: CPT

## 2023-02-06 PROCEDURE — 99232 SBSQ HOSP IP/OBS MODERATE 35: CPT | Mod: GC

## 2023-02-06 RX ADMIN — ATORVASTATIN CALCIUM 40 MILLIGRAM(S): 80 TABLET, FILM COATED ORAL at 22:28

## 2023-02-06 RX ADMIN — FLUCONAZOLE 200 MILLIGRAM(S): 150 TABLET ORAL at 17:21

## 2023-02-06 RX ADMIN — APIXABAN 2.5 MILLIGRAM(S): 2.5 TABLET, FILM COATED ORAL at 06:47

## 2023-02-06 RX ADMIN — Medication 100 MILLIGRAM(S): at 17:22

## 2023-02-06 RX ADMIN — PIPERACILLIN AND TAZOBACTAM 25 GRAM(S): 4; .5 INJECTION, POWDER, LYOPHILIZED, FOR SOLUTION INTRAVENOUS at 17:23

## 2023-02-06 RX ADMIN — APIXABAN 2.5 MILLIGRAM(S): 2.5 TABLET, FILM COATED ORAL at 17:22

## 2023-02-06 RX ADMIN — Medication 2: at 09:05

## 2023-02-06 RX ADMIN — Medication 7 UNIT(S): at 09:05

## 2023-02-06 RX ADMIN — BUMETANIDE 1 MILLIGRAM(S): 0.25 INJECTION INTRAMUSCULAR; INTRAVENOUS at 06:46

## 2023-02-06 RX ADMIN — INSULIN GLARGINE 21 UNIT(S): 100 INJECTION, SOLUTION SUBCUTANEOUS at 22:28

## 2023-02-06 RX ADMIN — CHLORHEXIDINE GLUCONATE 1 APPLICATION(S): 213 SOLUTION TOPICAL at 09:07

## 2023-02-06 RX ADMIN — Medication 7 UNIT(S): at 12:44

## 2023-02-06 RX ADMIN — GABAPENTIN 200 MILLIGRAM(S): 400 CAPSULE ORAL at 11:31

## 2023-02-06 RX ADMIN — PIPERACILLIN AND TAZOBACTAM 25 GRAM(S): 4; .5 INJECTION, POWDER, LYOPHILIZED, FOR SOLUTION INTRAVENOUS at 06:49

## 2023-02-06 RX ADMIN — Medication 100 MILLIGRAM(S): at 06:46

## 2023-02-06 RX ADMIN — Medication 650 MILLIGRAM(S): at 17:21

## 2023-02-06 RX ADMIN — Medication 650 MILLIGRAM(S): at 06:46

## 2023-02-06 RX ADMIN — Medication 7 UNIT(S): at 17:21

## 2023-02-06 RX ADMIN — Medication 2: at 17:22

## 2023-02-06 RX ADMIN — Medication 81 MILLIGRAM(S): at 11:32

## 2023-02-06 NOTE — PROGRESS NOTE ADULT - SUBJECTIVE AND OBJECTIVE BOX
Luciano Partida PGY1  pager [phone] or check resident tab for coverage    Patient is a 82y old  Female who presents with a chief complaint of acute cholecystitis, DKA (05 Feb 2023 16:07)    SUBJECTIVE / OVERNIGHT EVENTS:    NAEO.  Patient this morning is,    Brief Daily Plan:    MEDICATIONS  MEDICATIONS  (STANDING):  apixaban 2.5 milliGRAM(s) Oral two times a day  aspirin  chewable 81 milliGRAM(s) Oral daily  atorvastatin 40 milliGRAM(s) Oral at bedtime  buMETAnide 1 milliGRAM(s) Oral daily  chlorhexidine 4% Liquid 1 Application(s) Topical <User Schedule>  dextrose 5%. 1000 milliLiter(s) (100 mL/Hr) IV Continuous <Continuous>  dextrose 5%. 1000 milliLiter(s) (50 mL/Hr) IV Continuous <Continuous>  dextrose 50% Injectable 25 Gram(s) IV Push once  dextrose 50% Injectable 12.5 Gram(s) IV Push once  dextrose 50% Injectable 25 Gram(s) IV Push once  fluconAZOLE   Tablet 200 milliGRAM(s) Oral every 24 hours  gabapentin 200 milliGRAM(s) Oral daily  glucagon  Injectable 1 milliGRAM(s) IntraMuscular once  insulin glargine Injectable (LANTUS) 21 Unit(s) SubCutaneous at bedtime  insulin lispro (ADMELOG) corrective regimen sliding scale   SubCutaneous three times a day before meals  insulin lispro (ADMELOG) corrective regimen sliding scale   SubCutaneous at bedtime  insulin lispro Injectable (ADMELOG) 7 Unit(s) SubCutaneous three times a day before meals  lidocaine 2% Viscous 15 milliLiter(s) Oral four times a day  metoprolol tartrate 100 milliGRAM(s) Oral every 12 hours  piperacillin/tazobactam IVPB.. 3.375 Gram(s) IV Intermittent every 12 hours  sodium bicarbonate 650 milliGRAM(s) Oral two times a day    MEDICATIONS  (PRN):  acetaminophen     Tablet .. 650 milliGRAM(s) Oral every 6 hours PRN Mild Pain (1 - 3), Moderate Pain (4 - 6)  albuterol/ipratropium for Nebulization 3 milliLiter(s) Nebulizer every 6 hours PRN Shortness of Breath and/or Wheezing  aluminum hydroxide/magnesium hydroxide/simethicone Suspension 30 milliLiter(s) Oral every 6 hours PRN Dyspepsia  dextrose Oral Gel 15 Gram(s) Oral once PRN Blood Glucose LESS THAN 70 milliGRAM(s)/deciliter  sodium chloride 0.9% lock flush 10 milliLiter(s) IV Push every 1 hour PRN Pre/post blood products, medications, blood draw, and to maintain line patency      VITALS  Vital Signs Last 24 Hrs  T(C): 36.6 (06 Feb 2023 01:43), Max: 36.7 (05 Feb 2023 08:42)  T(F): 97.8 (06 Feb 2023 01:43), Max: 98 (05 Feb 2023 08:42)  HR: 68 (06 Feb 2023 01:43) (64 - 70)  BP: 146/71 (06 Feb 2023 01:43) (146/71 - 160/77)  BP(mean): --  RR: 18 (06 Feb 2023 01:43) (16 - 18)  SpO2: 94% (06 Feb 2023 01:43) (94% - 100%)    Parameters below as of 06 Feb 2023 01:43  Patient On (Oxygen Delivery Method): nasal cannula  O2 Flow (L/min): 2      PHYSICAL EXAM:  GENERAL: no distress  PSYCH: A&O x3  HEAD: Atraumatic, Normocephalic  NECK: Supple, No JVD  CHEST/LUNG: clear to auscultation bilaterally  HEART: regular rate and rhythm, no murmurs  ABDOMEN: nontender to palpation, no rebound tenderness/guarding  EXTREMITIES: no edema on bilateral LE  NEUROLOGY: no focal neurologic deficit  SKIN: No rashes or lesions    LABS:  All labs personally Reviewed.    RADIOLOGY & ADDITIONAL TESTS:  All imaging personally Reviewed.  Luciano Partida PGY1  pager [phone] or check resident tab for coverage    Patient is a 82y old  Female who presents with a chief complaint of acute cholecystitis, DKA (05 Feb 2023 16:07)    SUBJECTIVE / OVERNIGHT EVENTS:    NAEO.  Patient this morning is doing well.  Overall feels well.  Abdominal pain is improving. Voice is back to normal, throat pain is resolved. Hematuria is resolved. Shortness of breath is improved.    Brief Daily Plan:  ·	dispo planning to rehab  ·	c/w zosyn/fluconazole per ID  ·	appreciate all consulted services    MEDICATIONS  MEDICATIONS  (STANDING):  apixaban 2.5 milliGRAM(s) Oral two times a day  aspirin  chewable 81 milliGRAM(s) Oral daily  atorvastatin 40 milliGRAM(s) Oral at bedtime  buMETAnide 1 milliGRAM(s) Oral daily  chlorhexidine 4% Liquid 1 Application(s) Topical <User Schedule>  dextrose 5%. 1000 milliLiter(s) (100 mL/Hr) IV Continuous <Continuous>  dextrose 5%. 1000 milliLiter(s) (50 mL/Hr) IV Continuous <Continuous>  dextrose 50% Injectable 25 Gram(s) IV Push once  dextrose 50% Injectable 12.5 Gram(s) IV Push once  dextrose 50% Injectable 25 Gram(s) IV Push once  fluconAZOLE   Tablet 200 milliGRAM(s) Oral every 24 hours  gabapentin 200 milliGRAM(s) Oral daily  glucagon  Injectable 1 milliGRAM(s) IntraMuscular once  insulin glargine Injectable (LANTUS) 21 Unit(s) SubCutaneous at bedtime  insulin lispro (ADMELOG) corrective regimen sliding scale   SubCutaneous three times a day before meals  insulin lispro (ADMELOG) corrective regimen sliding scale   SubCutaneous at bedtime  insulin lispro Injectable (ADMELOG) 7 Unit(s) SubCutaneous three times a day before meals  lidocaine 2% Viscous 15 milliLiter(s) Oral four times a day  metoprolol tartrate 100 milliGRAM(s) Oral every 12 hours  piperacillin/tazobactam IVPB.. 3.375 Gram(s) IV Intermittent every 12 hours  sodium bicarbonate 650 milliGRAM(s) Oral two times a day    MEDICATIONS  (PRN):  acetaminophen     Tablet .. 650 milliGRAM(s) Oral every 6 hours PRN Mild Pain (1 - 3), Moderate Pain (4 - 6)  albuterol/ipratropium for Nebulization 3 milliLiter(s) Nebulizer every 6 hours PRN Shortness of Breath and/or Wheezing  aluminum hydroxide/magnesium hydroxide/simethicone Suspension 30 milliLiter(s) Oral every 6 hours PRN Dyspepsia  dextrose Oral Gel 15 Gram(s) Oral once PRN Blood Glucose LESS THAN 70 milliGRAM(s)/deciliter  sodium chloride 0.9% lock flush 10 milliLiter(s) IV Push every 1 hour PRN Pre/post blood products, medications, blood draw, and to maintain line patency      VITALS  Vital Signs Last 24 Hrs  T(C): 36.6 (06 Feb 2023 01:43), Max: 36.7 (05 Feb 2023 08:42)  T(F): 97.8 (06 Feb 2023 01:43), Max: 98 (05 Feb 2023 08:42)  HR: 68 (06 Feb 2023 01:43) (64 - 70)  BP: 146/71 (06 Feb 2023 01:43) (146/71 - 160/77)  BP(mean): --  RR: 18 (06 Feb 2023 01:43) (16 - 18)  SpO2: 94% (06 Feb 2023 01:43) (94% - 100%)    Parameters below as of 06 Feb 2023 01:43  Patient On (Oxygen Delivery Method): nasal cannula  O2 Flow (L/min): 2      PHYSICAL EXAM:  GENERAL: no distress  PSYCH: A&O x3  HEAD: Atraumatic, Normocephalic  NECK: Supple, No JVD  CHEST/LUNG: trace crackles to auscultation bilaterally  HEART: regular rate and rhythm, no murmurs  ABDOMEN: mild-mod tender to palpation RLQ RUQ, no rebound tenderness/guarding  EXTREMITIES: trace edema on bilateral LE  NEUROLOGY: no focal neurologic deficit  SKIN: No rashes or lesions    LABS:  All labs personally Reviewed.    RADIOLOGY & ADDITIONAL TESTS:  All imaging personally Reviewed.

## 2023-02-06 NOTE — PROGRESS NOTE ADULT - SUBJECTIVE AND OBJECTIVE BOX
EP ATTENDING    tele: NSR, no events    she denies palpitations, syncope, nor angina      DATE OF SERVICE - 02-06-23     Review of Systems:   Constitutional: [ ] fevers, [ ] chills.   Skin: [ ] dry skin. [ ] rashes.  Psychiatric: [ ] depression, [ ] anxiety.   Gastrointestinal: [ ] BRBPR, [ ] melena.   Neurological: [ ] confusion. [ ] seizures. [ ] shuffling gait.   Ears,Nose,Mouth and Throat: [ ] ear pain [ ] sore throat.   Eyes: [ ] diplopia.   Respiratory: [ ] hemoptysis. [ ] shortness of breath  Cardiovascular: See HPI above  Hematologic/Lymphatic: [ ] anemia. [ ] painful nodes. [ ] prolonged bleeding.   Genitourinary: [ ] hematuria. [ ] flank pain.   Endocrine: [ ] significant change in weight. [ ] intolerance to heat and cold.     Review of systems [ x] otherwise negative, [ ] otherwise unable to obtain    FH: no family history of sudden cardiac death in first degree relatives    SH: [ ] tobacco, [ ] alcohol, [ ] drugs    acetaminophen     Tablet .. 650 milliGRAM(s) Oral every 6 hours PRN  albuterol/ipratropium for Nebulization 3 milliLiter(s) Nebulizer every 6 hours PRN  aluminum hydroxide/magnesium hydroxide/simethicone Suspension 30 milliLiter(s) Oral every 6 hours PRN  apixaban 2.5 milliGRAM(s) Oral two times a day  aspirin  chewable 81 milliGRAM(s) Oral daily  atorvastatin 40 milliGRAM(s) Oral at bedtime  buMETAnide 1 milliGRAM(s) Oral daily  chlorhexidine 4% Liquid 1 Application(s) Topical <User Schedule>  dextrose 5%. 1000 milliLiter(s) IV Continuous <Continuous>  dextrose 5%. 1000 milliLiter(s) IV Continuous <Continuous>  dextrose 50% Injectable 25 Gram(s) IV Push once  dextrose 50% Injectable 12.5 Gram(s) IV Push once  dextrose 50% Injectable 25 Gram(s) IV Push once  dextrose Oral Gel 15 Gram(s) Oral once PRN  fluconAZOLE   Tablet 200 milliGRAM(s) Oral every 24 hours  gabapentin 200 milliGRAM(s) Oral daily  glucagon  Injectable 1 milliGRAM(s) IntraMuscular once  insulin glargine Injectable (LANTUS) 21 Unit(s) SubCutaneous at bedtime  insulin lispro (ADMELOG) corrective regimen sliding scale   SubCutaneous three times a day before meals  insulin lispro (ADMELOG) corrective regimen sliding scale   SubCutaneous at bedtime  insulin lispro Injectable (ADMELOG) 7 Unit(s) SubCutaneous three times a day before meals  lidocaine 2% Viscous 15 milliLiter(s) Oral four times a day  metoprolol tartrate 100 milliGRAM(s) Oral every 12 hours  piperacillin/tazobactam IVPB.. 3.375 Gram(s) IV Intermittent every 12 hours  sodium bicarbonate 650 milliGRAM(s) Oral two times a day  sodium chloride 0.9% lock flush 10 milliLiter(s) IV Push every 1 hour PRN                            9.2    11.23 )-----------( 395      ( 06 Feb 2023 07:35 )             29.4       02-06    137  |  101  |  29<H>  ----------------------------<  161<H>  4.0   |  25  |  2.42<H>    Ca    9.2      06 Feb 2023 07:35    TPro  6.6  /  Alb  2.8<L>  /  TBili  0.2  /  DBili  x   /  AST  107<H>  /  ALT  67<H>  /  AlkPhos  531<H>  02-06            T(C): 36.6 (02-06-23 @ 10:49), Max: 36.8 (02-06-23 @ 06:06)  HR: 62 (02-06-23 @ 10:49) (62 - 71)  BP: 123/77 (02-06-23 @ 10:49) (123/77 - 160/77)  RR: 18 (02-06-23 @ 10:49) (16 - 18)  SpO2: 98% (02-06-23 @ 10:49) (94% - 100%)  Wt(kg): --    I&O's Summary    05 Feb 2023 07:01  -  06 Feb 2023 07:00  --------------------------------------------------------  IN: 840 mL / OUT: 550 mL / NET: 290 mL    06 Feb 2023 07:01  -  06 Feb 2023 11:56  --------------------------------------------------------  IN: 270 mL / OUT: 350 mL / NET: -80 mL        General: Well nourished in no acute distress. Alert and Oriented * 3.   Head: Normocephalic and atraumatic.   Neck: No JVD. No bruits. Supple. Does not appear to be enlarged.   Cardiovascular: + S1,S2 ; RRR Soft systolic murmur at the left lower sternal border. No rubs noted.    Lungs: CTA b/l. No rhonchi, rales or wheezes.   Abdomen: + BS, soft. Non tender. Non distended. No rebound. No guarding.   Extremities: No clubbing/cyanosis/edema.   Neurologic: Moves all four extremities. Full range of motion.   Skin: Warm and moist. The patient's skin has normal elasticity and good skin turgor.   Psychiatric: Appropriate mood and affect.  Musculoskeletal: Normal range of motion, normal strength      A/P) She is an 83 y/o female PMH advanced CKD, hypertension, hyperlipidemia, diabetes, CAD s/p CABG (11/2020) with normal LVEF admitted on 1/18/23 with acute cholecystitis requiring ERCP and percutaneous drain. On 1/27 she went into atrial flutter (difficult to tell if typical or atypical) and has since terminated to NSR. Her other medical history includes right breast CA s/p mastectomy (1989), rectal CA (2015).    -continue eliquis for lifelong anticoagulation for stroke prevention  -continue metoprolol 100 bid   -if AF/AFL recurs will start amiodarone  -poor candidate for ablation at this time  -continue aspirin and lipitor for CAD s/p CABG      Odilon Roque M.D., [facility]  Cardiac Electrophysiology  [facility]  [address] [facility], [address] [place] [address]  [email or web address]    office [phone]  pager [phone]

## 2023-02-06 NOTE — PROGRESS NOTE ADULT - ASSESSMENT
82F history of HTN, HLD, DM2, HFpEF (EF 65%), CAD s/p CABG, Breast Ca s/p R partial mastectomy, Rectal Ca s/p resection presenting with abdominal pain x 1 week found to have acute cholecystitis, not a surgical candidate. S/p ERCP with stent placement course c/b gallbladder perforation s/p perc rissa w/ fluid c/f candida and GPCs on zosyn, fluconazole; perc rissa dislodged on 2/1/23. 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 New hematuria 2/2/23, resolved on 2/4/23, eliquis resumed.    82F history of HTN, HLD, DM2, HFpEF (EF 65%), CAD s/p CABG, Breast Ca s/p R partial mastectomy, Rectal Ca s/p resection presenting with abdominal pain x 1 week found to have acute cholecystitis, not a surgical candidate. S/p ERCP with stent placement course c/b gallbladder perforation s/p perc rissa w/ fluid c/f candida and GPCs on zosyn, fluconazole; perc rissa dislodged on 2/1/23, HIDA negative for cholecystitis, no further intervention per surgery and IR. 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 New hematuria 2/2/23, resolved on 2/4/23, eliquis resumed. Pending dispo to [facility].

## 2023-02-06 NOTE — PROGRESS NOTE ADULT - PROBLEM SELECTOR PLAN 3
- baseline 1.5 - 1.7  - single functioning kidney (right w/ known severe hydronephrosis)  - non-oliguric TAO multifactorial largely i/s/o relative hypotension  - likely ATN now    Plan:  - Cr now downtrending  - monitor on diuresis as below  - dose adjust medications for Cr - resolved, eliquis resumed without issue  - new gross hematuria 2/1 - 2/2  - straight cath UA confirms hematuria  - urine appears brown  - CT on 1/31 without evidence of kidney stones per discussion with radiologist  - appreciate urology recs

## 2023-02-06 NOTE — PROGRESS NOTE ADULT - PROBLEM SELECTOR PLAN 2
- new gross hematuria 2/1 - 2/2  - straight cath UA confirms hematuria  - urine appears brown  - CT on 1/31 without evidence of kidney stones per discussion with radiologist  - appreciate urology recs - baseline 1.5 - 1.7  - single functioning kidney (right w/ known severe hydronephrosis)  - non-oliguric TAO multifactorial largely i/s/o relative hypotension  - likely ATN now    Plan:  - Cr now downtrending  - monitor on diuresis as below  - dose adjust medications for Cr

## 2023-02-06 NOTE — PROGRESS NOTE ADULT - ASSESSMENT
82 year old female with HTN, HLD, DM2, HFpEF, breast cancer s/p R partial mastectomy, rectal carcinoma s/p resection, CAD/ CABG presenting with abd pain, nausea and vomiting, found to have acute cholecystitis with choledocholithiasis. Underwent ERCP with biliary stent placement with repeat CT A/P with new fluid collection near the GB neck s/p perc rissa on 1/25 - culture with yeast. Leukocytosis overall better but remains elevated. Repeat CT with slight interval increase in GB neck fluid collection with drainage catheter in the RUQ adjacent to the GB and with new R perinephric fluid and loculated R RP fluid collection. No longer with biliary drain. LFTs rising and WBC rising.    Recommend:  #Acute cholecystitis with choledocholithiasis/ abscess  -Continue zosyn, patient tolerating  -Continue fluconazole 200 mg po q 24 hours  -Monitor for fevers  -Trend LFTs and WBC - both rising    #Oral lesions, vesicular lesions  -Completed acyclovir    Dk Fonseca MD  Available through MS Teams  If no response, or after 5pm/weekends, call [phone]    Plan discussed with primary team.

## 2023-02-06 NOTE — PROGRESS NOTE ADULT - SUBJECTIVE AND OBJECTIVE BOX
CC: Patient is a 82y old  Female who presents with a chief complaint of acute cholecystitis, DKA (06 Feb 2023 16:10)    ID following for acute cholecystitis    Interval History/ROS: Patient states RUQ pain has improved. No fevers, no chills.    Rest of ROS negative.    Allergies  CT scan dye (Hives)  penicillin (Unknown)    ANTIMICROBIALS:  fluconAZOLE   Tablet 200 every 24 hours  piperacillin/tazobactam IVPB.. 3.375 every 12 hours    OTHER MEDS:  acetaminophen     Tablet .. 650 milliGRAM(s) Oral every 6 hours PRN  albuterol/ipratropium for Nebulization 3 milliLiter(s) Nebulizer every 6 hours PRN  aluminum hydroxide/magnesium hydroxide/simethicone Suspension 30 milliLiter(s) Oral every 6 hours PRN  apixaban 2.5 milliGRAM(s) Oral two times a day  aspirin  chewable 81 milliGRAM(s) Oral daily  atorvastatin 40 milliGRAM(s) Oral at bedtime  buMETAnide 1 milliGRAM(s) Oral daily  chlorhexidine 4% Liquid 1 Application(s) Topical <User Schedule>  dextrose 5%. 1000 milliLiter(s) IV Continuous <Continuous>  dextrose 5%. 1000 milliLiter(s) IV Continuous <Continuous>  dextrose 50% Injectable 25 Gram(s) IV Push once  dextrose 50% Injectable 12.5 Gram(s) IV Push once  dextrose 50% Injectable 25 Gram(s) IV Push once  dextrose Oral Gel 15 Gram(s) Oral once PRN  gabapentin 200 milliGRAM(s) Oral daily  glucagon  Injectable 1 milliGRAM(s) IntraMuscular once  insulin glargine Injectable (LANTUS) 21 Unit(s) SubCutaneous at bedtime  insulin lispro (ADMELOG) corrective regimen sliding scale   SubCutaneous three times a day before meals  insulin lispro (ADMELOG) corrective regimen sliding scale   SubCutaneous at bedtime  insulin lispro Injectable (ADMELOG) 7 Unit(s) SubCutaneous three times a day before meals  lidocaine 2% Viscous 15 milliLiter(s) Oral four times a day  metoprolol tartrate 100 milliGRAM(s) Oral every 12 hours  sodium bicarbonate 650 milliGRAM(s) Oral two times a day  sodium chloride 0.9% lock flush 10 milliLiter(s) IV Push every 1 hour PRN    PE:    Vital Signs Last 24 Hrs  T(C): 36.3 (06 Feb 2023 13:48), Max: 36.8 (06 Feb 2023 06:06)  T(F): 97.4 (06 Feb 2023 13:48), Max: 98.3 (06 Feb 2023 06:06)  HR: 65 (06 Feb 2023 13:48) (62 - 73)  BP: 115/70 (06 Feb 2023 13:48) (112/69 - 146/71)  BP(mean): --  RR: 18 (06 Feb 2023 13:48) (18 - 18)  SpO2: 96% (06 Feb 2023 13:48) (94% - 98%)    Parameters below as of 06 Feb 2023 13:48  Patient On (Oxygen Delivery Method): room air    Gen: AOx3, NAD  CV: S1+S2 normal, no murmurs  Resp: Clear bilat, no resp distress  Abd: Soft, nontender, +BS  Ext: No LE edema, no wounds  : No Dowling  IV/Skin: No thrombophlebitis  Neuro: no focal deficits    LABS:                          9.2    11.23 )-----------( 395      ( 06 Feb 2023 07:35 )             29.4       02-06    137  |  101  |  29<H>  ----------------------------<  161<H>  4.0   |  25  |  2.42<H>    Ca    9.2      06 Feb 2023 07:35    TPro  6.6  /  Alb  2.8<L>  /  TBili  0.2  /  DBili  x   /  AST  107<H>  /  ALT  67<H>  /  AlkPhos  531<H>  02-06          MICROBIOLOGY:  v  Catheterized Catheterized  02-02-23   No growth  --  --      Abdominal Fl Abdominal Fluid  01-25-23   Rare Candida albicans  Please Note:************************************************  GPR seen in Gram stain is non-viable after prolonged  incubation and repeated subculture.  --  Candida albicans      .Blood Blood-Peripheral  01-22-23   No Growth Final  --  --      .Blood Blood-Peripheral  01-22-23   No Growth Final  --  --      .Blood Blood-Venous  01-20-23   No Growth Final  --  --    Rapid RVP Result: NotDetec (02-06 @ 13:12)  Rapid RVP Result: NotDetec (02-01 @ 11:11)    RADIOLOGY:    < from: CT Abdomen and Pelvis No Cont (02.03.23 @ 10:10) >  IMPRESSION:    Cholelithiasis with multiple small calcified gallstones within the low   inserting cystic duct. Persistent wall thickening of the gallbladder   neck. Persistent pericholecystic inflammation surrounding gallbladder   neck and cystic duct. Mild decrease in size of the pericholecystic   probable biloma as described    < end of copied text >

## 2023-02-06 NOTE — PROGRESS NOTE ADULT - SUBJECTIVE AND OBJECTIVE BOX
DATE OF SERVICE: 02-06-23 @ 16:10    Patient is a 82y old  Female who presents with a chief complaint of acute cholecystitis, DKA (06 Feb 2023 11:55)      INTERVAL HISTORY: Feels ok.     REVIEW OF SYSTEMS:  CONSTITUTIONAL: No weakness  EYES/ENT: No visual changes;  No throat pain   NECK: No pain or stiffness  RESPIRATORY: No cough, wheezing; No shortness of breath  CARDIOVASCULAR: No chest pain or palpitations  GASTROINTESTINAL: No abdominal  pain. No nausea, vomiting, or hematemesis  GENITOURINARY: No dysuria, frequency or hematuria  NEUROLOGICAL: No stroke like symptoms  SKIN: No rashes    TELEMETRY Personally reviewed: SR 60-80 PVC/Bigem  	  MEDICATIONS:  buMETAnide 1 milliGRAM(s) Oral daily  metoprolol tartrate 100 milliGRAM(s) Oral every 12 hours        PHYSICAL EXAM:  T(C): 36.3 (02-06-23 @ 13:48), Max: 36.8 (02-06-23 @ 06:06)  HR: 65 (02-06-23 @ 13:48) (62 - 73)  BP: 115/70 (02-06-23 @ 13:48) (112/69 - 160/77)  RR: 18 (02-06-23 @ 13:48) (16 - 18)  SpO2: 96% (02-06-23 @ 13:48) (94% - 100%)  Wt(kg): --  I&O's Summary    05 Feb 2023 07:01  -  06 Feb 2023 07:00  --------------------------------------------------------  IN: 840 mL / OUT: 550 mL / NET: 290 mL    06 Feb 2023 07:01  -  06 Feb 2023 16:11  --------------------------------------------------------  IN: 470 mL / OUT: 600 mL / NET: -130 mL          Appearance: In no distress	  HEENT:    PERRL, EOMI	  Cardiovascular:  S1 S2, No JVD  Respiratory: Lungs clear to auscultation	  Gastrointestinal:  Soft, Non-tender, + BS	  Vascularature:  No edema of LE  Psychiatric: Appropriate affect   Neuro: no acute focal deficits                               9.2    11.23 )-----------( 395      ( 06 Feb 2023 07:35 )             29.4     02-06    137  |  101  |  29<H>  ----------------------------<  161<H>  4.0   |  25  |  2.42<H>    Ca    9.2      06 Feb 2023 07:35    TPro  6.6  /  Alb  2.8<L>  /  TBili  0.2  /  DBili  x   /  AST  107<H>  /  ALT  67<H>  /  AlkPhos  531<H>  02-06        Labs personally reviewed      ASSESSMENT/PLAN: 	    Ms. Batista displays no signs or symptoms of acute coronary ischemia or decompensated CHF.  Admission EKG is sinus with pre-existing anteroseptal q-waves.  Recent echo noted- normal LV systolic function with no hemodynamically significant valvular disease.    #Decompensated diastolic CHF-  Patient with severe pulm pressures on echo  c/w Bumex 1mg PO daily  Appears euvolemic with improving Cr. Will cont to assess daily.     #CAD s/p CABG-  No signs of active ischemic.  c/w ASA and statin  - 1/26 with c/o epigastric burning a/b episodes of emesis. ECG performed, no ischemic changes noted.   -1/28 c/o chest pain and epigastric pain similar to 2 days ago. Repeat EKG with no ischemic changes. Trop wnl.  This is most likely noncardiac in nature given patient's recent cholecystitis     #Sepsis   - 2/2 acute cholecystitis   - S/p CBD stent  - s/p IR drain 1/25  - Afebrile, WBC downtrending  - s/p ERCP 1/20 w/ stent  - perc rissa 1/25 - dislodged 2/1  - abdominal fluid cx w/ GPCs and candida  - c/w zosyn, fluconazole per ID  - plan for repeat ERCP w/ stent removal in 2 - 3 months  - follow fever/WBC curve, if c/f sepsis IR to replace dislodged rissa tube.    #SVT  - 1/26 patent HR in 130s while sleeping. Patient missed am dose of Metoprolol 150mg PO.  - Resume Metoprolol  - Adenosine 6mg IV x1 with termination of SVT  - cont to monitor on tele  - 1/27 with recurrent SVT s/p adenosine today   - EP consulted. Likely A-Tach.   - HR has been stable. Metoprolol decreased to 100mg PO BID.         Delaney Vasquez, FABIOLA-NP   Sergey Winchester,  [facility]  Cardiovascular Medicine  [address] [place] [address], [address]  Available through call or text on [employer]  Office: [phone]

## 2023-02-06 NOTE — PROGRESS NOTE ADULT - PROBLEM SELECTOR PLAN 7
Diet: CC  DVT PPx: hold home eliquis for possible procedure per IR  Dispo: likely [facility] pending medical optimization Diet: CC  DVT PPx: c/w home eliquis  Dispo: pending dispo to [facility]

## 2023-02-06 NOTE — PROGRESS NOTE ADULT - NS ATTEND OPT1 GEN_ALL_CORE

## 2023-02-06 NOTE — PROGRESS NOTE ADULT - NS ATTEND AMEND GEN_ALL_CORE FT
Patient care and plan discussed and reviewed with Advanced Care Provider. Plan as outlined above edited by me to reflect our discussion.
resting comfortably, no new complaints and no new issues overnight.
Patient care and plan discussed and reviewed with Advanced Care Provider. Plan as outlined above edited by me to reflect our discussion.
Patient care and plan discussed and reviewed with Advanced Care Provider. Plan as outlined above edited by me to reflect our discussion.

## 2023-02-07 ENCOUNTER — TRANSCRIPTION ENCOUNTER (OUTPATIENT)
Age: 83
End: 2023-02-07

## 2023-02-07 VITALS
DIASTOLIC BLOOD PRESSURE: 65 MMHG | SYSTOLIC BLOOD PRESSURE: 111 MMHG | TEMPERATURE: 98 F | OXYGEN SATURATION: 96 % | RESPIRATION RATE: 18 BRPM | HEART RATE: 73 BPM

## 2023-02-07 LAB
ALBUMIN SERPL ELPH-MCNC: 2.9 G/DL — LOW (ref 3.3–5)
ALP SERPL-CCNC: 430 U/L — HIGH (ref 40–120)
ALT FLD-CCNC: 53 U/L — HIGH (ref 10–45)
ANION GAP SERPL CALC-SCNC: 13 MMOL/L — SIGNIFICANT CHANGE UP (ref 5–17)
AST SERPL-CCNC: 57 U/L — HIGH (ref 10–40)
BILIRUB SERPL-MCNC: 0.1 MG/DL — LOW (ref 0.2–1.2)
BUN SERPL-MCNC: 26 MG/DL — HIGH (ref 7–23)
CALCIUM SERPL-MCNC: 9.3 MG/DL — SIGNIFICANT CHANGE UP (ref 8.4–10.5)
CHLORIDE SERPL-SCNC: 101 MMOL/L — SIGNIFICANT CHANGE UP (ref 96–108)
CO2 SERPL-SCNC: 25 MMOL/L — SIGNIFICANT CHANGE UP (ref 22–31)
CREAT SERPL-MCNC: 2.26 MG/DL — HIGH (ref 0.5–1.3)
EGFR: 21 ML/MIN/1.73M2 — LOW
GLUCOSE BLDC GLUCOMTR-MCNC: 179 MG/DL — HIGH (ref 70–99)
GLUCOSE BLDC GLUCOMTR-MCNC: 210 MG/DL — HIGH (ref 70–99)
GLUCOSE SERPL-MCNC: 148 MG/DL — HIGH (ref 70–99)
HCT VFR BLD CALC: 32.4 % — LOW (ref 34.5–45)
HGB BLD-MCNC: 9.9 G/DL — LOW (ref 11.5–15.5)
MAGNESIUM SERPL-MCNC: 1.7 MG/DL — SIGNIFICANT CHANGE UP (ref 1.6–2.6)
MCHC RBC-ENTMCNC: 29.4 PG — SIGNIFICANT CHANGE UP (ref 27–34)
MCHC RBC-ENTMCNC: 30.6 GM/DL — LOW (ref 32–36)
MCV RBC AUTO: 96.1 FL — SIGNIFICANT CHANGE UP (ref 80–100)
NRBC # BLD: 0 /100 WBCS — SIGNIFICANT CHANGE UP (ref 0–0)
PHOSPHATE SERPL-MCNC: 4.1 MG/DL — SIGNIFICANT CHANGE UP (ref 2.5–4.5)
PLATELET # BLD AUTO: 361 K/UL — SIGNIFICANT CHANGE UP (ref 150–400)
POTASSIUM SERPL-MCNC: 3.9 MMOL/L — SIGNIFICANT CHANGE UP (ref 3.5–5.3)
POTASSIUM SERPL-SCNC: 3.9 MMOL/L — SIGNIFICANT CHANGE UP (ref 3.5–5.3)
PROT SERPL-MCNC: 6.9 G/DL — SIGNIFICANT CHANGE UP (ref 6–8.3)
RBC # BLD: 3.37 M/UL — LOW (ref 3.8–5.2)
RBC # FLD: 16.4 % — HIGH (ref 10.3–14.5)
SODIUM SERPL-SCNC: 139 MMOL/L — SIGNIFICANT CHANGE UP (ref 135–145)
WBC # BLD: 9.75 K/UL — SIGNIFICANT CHANGE UP (ref 3.8–10.5)
WBC # FLD AUTO: 9.75 K/UL — SIGNIFICANT CHANGE UP (ref 3.8–10.5)

## 2023-02-07 PROCEDURE — 76770 US EXAM ABDO BACK WALL COMP: CPT

## 2023-02-07 PROCEDURE — 87040 BLOOD CULTURE FOR BACTERIA: CPT

## 2023-02-07 PROCEDURE — 84100 ASSAY OF PHOSPHORUS: CPT

## 2023-02-07 PROCEDURE — 87070 CULTURE OTHR SPECIMN AEROBIC: CPT

## 2023-02-07 PROCEDURE — 87075 CULTR BACTERIA EXCEPT BLOOD: CPT

## 2023-02-07 PROCEDURE — 87637 SARSCOV2&INF A&B&RSV AMP PRB: CPT

## 2023-02-07 PROCEDURE — 83880 ASSAY OF NATRIURETIC PEPTIDE: CPT

## 2023-02-07 PROCEDURE — 87077 CULTURE AEROBIC IDENTIFY: CPT

## 2023-02-07 PROCEDURE — 76705 ECHO EXAM OF ABDOMEN: CPT

## 2023-02-07 PROCEDURE — 99232 SBSQ HOSP IP/OBS MODERATE 35: CPT

## 2023-02-07 PROCEDURE — 93005 ELECTROCARDIOGRAM TRACING: CPT

## 2023-02-07 PROCEDURE — 47490 INCISION OF GALLBLADDER: CPT

## 2023-02-07 PROCEDURE — 80048 BASIC METABOLIC PNL TOTAL CA: CPT

## 2023-02-07 PROCEDURE — 71045 X-RAY EXAM CHEST 1 VIEW: CPT

## 2023-02-07 PROCEDURE — 87186 SC STD MICRODIL/AGAR DIL: CPT

## 2023-02-07 PROCEDURE — 82803 BLOOD GASES ANY COMBINATION: CPT

## 2023-02-07 PROCEDURE — 85027 COMPLETE CBC AUTOMATED: CPT

## 2023-02-07 PROCEDURE — 97161 PT EVAL LOW COMPLEX 20 MIN: CPT

## 2023-02-07 PROCEDURE — 82150 ASSAY OF AMYLASE: CPT

## 2023-02-07 PROCEDURE — 99239 HOSP IP/OBS DSCHRG MGMT >30: CPT

## 2023-02-07 PROCEDURE — 81001 URINALYSIS AUTO W/SCOPE: CPT

## 2023-02-07 PROCEDURE — 84484 ASSAY OF TROPONIN QUANT: CPT

## 2023-02-07 PROCEDURE — 94640 AIRWAY INHALATION TREATMENT: CPT

## 2023-02-07 PROCEDURE — C8929: CPT

## 2023-02-07 PROCEDURE — C2625: CPT

## 2023-02-07 PROCEDURE — 86850 RBC ANTIBODY SCREEN: CPT

## 2023-02-07 PROCEDURE — 83935 ASSAY OF URINE OSMOLALITY: CPT

## 2023-02-07 PROCEDURE — 82435 ASSAY OF BLOOD CHLORIDE: CPT

## 2023-02-07 PROCEDURE — 74330 X-RAY BILE/PANC ENDOSCOPY: CPT

## 2023-02-07 PROCEDURE — 86900 BLOOD TYPING SEROLOGIC ABO: CPT

## 2023-02-07 PROCEDURE — 97530 THERAPEUTIC ACTIVITIES: CPT

## 2023-02-07 PROCEDURE — 80061 LIPID PANEL: CPT

## 2023-02-07 PROCEDURE — C9399: CPT

## 2023-02-07 PROCEDURE — 82570 ASSAY OF URINE CREATININE: CPT

## 2023-02-07 PROCEDURE — 86901 BLOOD TYPING SEROLOGIC RH(D): CPT

## 2023-02-07 PROCEDURE — C1729: CPT

## 2023-02-07 PROCEDURE — 96374 THER/PROPH/DIAG INJ IV PUSH: CPT

## 2023-02-07 PROCEDURE — 83605 ASSAY OF LACTIC ACID: CPT

## 2023-02-07 PROCEDURE — 85018 HEMOGLOBIN: CPT

## 2023-02-07 PROCEDURE — 74181 MRI ABDOMEN W/O CONTRAST: CPT

## 2023-02-07 PROCEDURE — A9537: CPT

## 2023-02-07 PROCEDURE — 83036 HEMOGLOBIN GLYCOSYLATED A1C: CPT

## 2023-02-07 PROCEDURE — 97116 GAIT TRAINING THERAPY: CPT

## 2023-02-07 PROCEDURE — 84443 ASSAY THYROID STIM HORMONE: CPT

## 2023-02-07 PROCEDURE — 0225U NFCT DS DNA&RNA 21 SARSCOV2: CPT

## 2023-02-07 PROCEDURE — 84133 ASSAY OF URINE POTASSIUM: CPT

## 2023-02-07 PROCEDURE — 87205 SMEAR GRAM STAIN: CPT

## 2023-02-07 PROCEDURE — 84300 ASSAY OF URINE SODIUM: CPT

## 2023-02-07 PROCEDURE — 82330 ASSAY OF CALCIUM: CPT

## 2023-02-07 PROCEDURE — 84540 ASSAY OF URINE/UREA-N: CPT

## 2023-02-07 PROCEDURE — 80053 COMPREHEN METABOLIC PANEL: CPT

## 2023-02-07 PROCEDURE — 84156 ASSAY OF PROTEIN URINE: CPT

## 2023-02-07 PROCEDURE — U0003: CPT

## 2023-02-07 PROCEDURE — 96375 TX/PRO/DX INJ NEW DRUG ADDON: CPT

## 2023-02-07 PROCEDURE — 36415 COLL VENOUS BLD VENIPUNCTURE: CPT

## 2023-02-07 PROCEDURE — 97110 THERAPEUTIC EXERCISES: CPT

## 2023-02-07 PROCEDURE — 82553 CREATINE MB FRACTION: CPT

## 2023-02-07 PROCEDURE — C1751: CPT

## 2023-02-07 PROCEDURE — 85025 COMPLETE CBC W/AUTO DIFF WBC: CPT

## 2023-02-07 PROCEDURE — 83735 ASSAY OF MAGNESIUM: CPT

## 2023-02-07 PROCEDURE — 82962 GLUCOSE BLOOD TEST: CPT

## 2023-02-07 PROCEDURE — 36569 INSJ PICC 5 YR+ W/O IMAGING: CPT

## 2023-02-07 PROCEDURE — 85014 HEMATOCRIT: CPT

## 2023-02-07 PROCEDURE — 83690 ASSAY OF LIPASE: CPT

## 2023-02-07 PROCEDURE — 87086 URINE CULTURE/COLONY COUNT: CPT

## 2023-02-07 PROCEDURE — 82010 KETONE BODYS QUAN: CPT

## 2023-02-07 PROCEDURE — 99285 EMERGENCY DEPT VISIT HI MDM: CPT | Mod: 25

## 2023-02-07 PROCEDURE — 82550 ASSAY OF CK (CPK): CPT

## 2023-02-07 PROCEDURE — 84295 ASSAY OF SERUM SODIUM: CPT

## 2023-02-07 PROCEDURE — 85610 PROTHROMBIN TIME: CPT

## 2023-02-07 PROCEDURE — 74176 CT ABD & PELVIS W/O CONTRAST: CPT | Mod: MA

## 2023-02-07 PROCEDURE — 78226 HEPATOBILIARY SYSTEM IMAGING: CPT

## 2023-02-07 PROCEDURE — 84132 ASSAY OF SERUM POTASSIUM: CPT

## 2023-02-07 PROCEDURE — 85730 THROMBOPLASTIN TIME PARTIAL: CPT

## 2023-02-07 PROCEDURE — 82947 ASSAY GLUCOSE BLOOD QUANT: CPT

## 2023-02-07 PROCEDURE — U0005: CPT

## 2023-02-07 PROCEDURE — C1769: CPT

## 2023-02-07 RX ORDER — PANTOPRAZOLE SODIUM 20 MG/1
1 TABLET, DELAYED RELEASE ORAL
Qty: 0 | Refills: 0 | DISCHARGE
Start: 2023-02-07

## 2023-02-07 RX ORDER — MAGNESIUM SULFATE 500 MG/ML
2 VIAL (ML) INJECTION ONCE
Refills: 0 | Status: COMPLETED | OUTPATIENT
Start: 2023-02-07 | End: 2023-02-07

## 2023-02-07 RX ORDER — FLUCONAZOLE 150 MG/1
1 TABLET ORAL
Qty: 0 | Refills: 0 | DISCHARGE
Start: 2023-02-07

## 2023-02-07 RX ORDER — APIXABAN 2.5 MG/1
1 TABLET, FILM COATED ORAL
Qty: 0 | Refills: 0 | DISCHARGE
Start: 2023-02-07

## 2023-02-07 RX ORDER — ACETAMINOPHEN 500 MG
2 TABLET ORAL
Qty: 0 | Refills: 0 | DISCHARGE
Start: 2023-02-07

## 2023-02-07 RX ORDER — SODIUM BICARBONATE 1 MEQ/ML
1 SYRINGE (ML) INTRAVENOUS
Qty: 0 | Refills: 0 | DISCHARGE
Start: 2023-02-07

## 2023-02-07 RX ORDER — BUMETANIDE 0.25 MG/ML
1 INJECTION INTRAMUSCULAR; INTRAVENOUS
Qty: 0 | Refills: 0 | DISCHARGE
Start: 2023-02-07

## 2023-02-07 RX ORDER — ATORVASTATIN CALCIUM 80 MG/1
1 TABLET, FILM COATED ORAL
Qty: 0 | Refills: 0 | DISCHARGE
Start: 2023-02-07

## 2023-02-07 RX ORDER — INSULIN LISPRO 100/ML
7 VIAL (ML) SUBCUTANEOUS
Qty: 0 | Refills: 0 | DISCHARGE
Start: 2023-02-07

## 2023-02-07 RX ORDER — PANTOPRAZOLE SODIUM 20 MG/1
40 TABLET, DELAYED RELEASE ORAL
Refills: 0 | Status: DISCONTINUED | OUTPATIENT
Start: 2023-02-07 | End: 2023-02-07

## 2023-02-07 RX ORDER — GABAPENTIN 400 MG/1
2 CAPSULE ORAL
Qty: 0 | Refills: 0 | DISCHARGE
Start: 2023-02-07

## 2023-02-07 RX ORDER — GABAPENTIN 400 MG/1
1 CAPSULE ORAL
Qty: 0 | Refills: 0 | DISCHARGE

## 2023-02-07 RX ORDER — ASPIRIN/CALCIUM CARB/MAGNESIUM 324 MG
1 TABLET ORAL
Qty: 0 | Refills: 0 | DISCHARGE
Start: 2023-02-07

## 2023-02-07 RX ORDER — FLUCONAZOLE 150 MG/1
1 TABLET ORAL
Qty: 0 | Refills: 0 | DISCHARGE
Start: 2023-02-07 | End: 2023-02-14

## 2023-02-07 RX ORDER — SPIRONOLACTONE 25 MG/1
0.5 TABLET, FILM COATED ORAL
Qty: 0 | Refills: 0 | DISCHARGE

## 2023-02-07 RX ORDER — INSULIN GLARGINE 100 [IU]/ML
21 INJECTION, SOLUTION SUBCUTANEOUS
Qty: 0 | Refills: 0 | DISCHARGE
Start: 2023-02-07

## 2023-02-07 RX ORDER — METOPROLOL TARTRATE 50 MG
1 TABLET ORAL
Qty: 0 | Refills: 0 | DISCHARGE
Start: 2023-02-07

## 2023-02-07 RX ORDER — POTASSIUM CHLORIDE 20 MEQ
20 PACKET (EA) ORAL ONCE
Refills: 0 | Status: COMPLETED | OUTPATIENT
Start: 2023-02-07 | End: 2023-02-07

## 2023-02-07 RX ADMIN — Medication 650 MILLIGRAM(S): at 05:45

## 2023-02-07 RX ADMIN — Medication 25 GRAM(S): at 11:56

## 2023-02-07 RX ADMIN — Medication 20 MILLIEQUIVALENT(S): at 11:56

## 2023-02-07 RX ADMIN — Medication 1 TABLET(S): at 14:35

## 2023-02-07 RX ADMIN — GABAPENTIN 200 MILLIGRAM(S): 400 CAPSULE ORAL at 11:56

## 2023-02-07 RX ADMIN — APIXABAN 2.5 MILLIGRAM(S): 2.5 TABLET, FILM COATED ORAL at 05:45

## 2023-02-07 RX ADMIN — PANTOPRAZOLE SODIUM 40 MILLIGRAM(S): 20 TABLET, DELAYED RELEASE ORAL at 09:39

## 2023-02-07 RX ADMIN — Medication 4: at 14:18

## 2023-02-07 RX ADMIN — PIPERACILLIN AND TAZOBACTAM 25 GRAM(S): 4; .5 INJECTION, POWDER, LYOPHILIZED, FOR SOLUTION INTRAVENOUS at 05:45

## 2023-02-07 RX ADMIN — Medication 100 MILLIGRAM(S): at 05:45

## 2023-02-07 RX ADMIN — Medication 2: at 09:37

## 2023-02-07 RX ADMIN — CHLORHEXIDINE GLUCONATE 1 APPLICATION(S): 213 SOLUTION TOPICAL at 09:38

## 2023-02-07 RX ADMIN — Medication 7 UNIT(S): at 14:18

## 2023-02-07 RX ADMIN — Medication 81 MILLIGRAM(S): at 11:57

## 2023-02-07 RX ADMIN — BUMETANIDE 1 MILLIGRAM(S): 0.25 INJECTION INTRAMUSCULAR; INTRAVENOUS at 05:48

## 2023-02-07 RX ADMIN — Medication 7 UNIT(S): at 09:37

## 2023-02-07 NOTE — PROGRESS NOTE ADULT - PROBLEM SELECTOR PLAN 3
- resolved, eliquis resumed without issue  - new gross hematuria 2/1 - 2/2  - straight cath UA confirms hematuria  - urine appears brown  - CT on 1/31 without evidence of kidney stones per discussion with radiologist  - appreciate urology recs

## 2023-02-07 NOTE — DISCHARGE NOTE NURSING/CASE MANAGEMENT/SOCIAL WORK - NSDCPEELIQUIS_GEN_ALL_CORE
Returned pt phone call     Pt states he was going to be brought back for another procedure to have stents placed and is just waiting on a date and time    Apixaban/Eliquis - Compliance/Apixaban/Eliquis - Dietary Advice/Apixaban/Eliquis - Follow up monitoring/Apixaban/Eliquis - Potential for adverse drug reactions and interactions

## 2023-02-07 NOTE — PROGRESS NOTE ADULT - SUBJECTIVE AND OBJECTIVE BOX
CC: Patient is a 82y old  Female who presents with a chief complaint of acute cholecystitis, DKA (07 Feb 2023 10:35)    ID following for acute cholecystitis    Interval History/ROS: Patient has no complaints. RUQ pain has resolved. No fevers, no chills.    Rest of ROS negative.    Allergies  CT scan dye (Hives)  penicillin (Unknown)    ANTIMICROBIALS:  amoxicillin  875 milliGRAM(s)/clavulanate 1 every 8 hours  fluconAZOLE   Tablet 200 every 24 hours    OTHER MEDS:  acetaminophen     Tablet .. 650 milliGRAM(s) Oral every 6 hours PRN  albuterol/ipratropium for Nebulization 3 milliLiter(s) Nebulizer every 6 hours PRN  aluminum hydroxide/magnesium hydroxide/simethicone Suspension 30 milliLiter(s) Oral every 6 hours PRN  apixaban 2.5 milliGRAM(s) Oral two times a day  aspirin  chewable 81 milliGRAM(s) Oral daily  atorvastatin 40 milliGRAM(s) Oral at bedtime  buMETAnide 1 milliGRAM(s) Oral daily  chlorhexidine 4% Liquid 1 Application(s) Topical <User Schedule>  dextrose 5%. 1000 milliLiter(s) IV Continuous <Continuous>  dextrose 5%. 1000 milliLiter(s) IV Continuous <Continuous>  dextrose 50% Injectable 25 Gram(s) IV Push once  dextrose 50% Injectable 12.5 Gram(s) IV Push once  dextrose 50% Injectable 25 Gram(s) IV Push once  dextrose Oral Gel 15 Gram(s) Oral once PRN  gabapentin 200 milliGRAM(s) Oral daily  glucagon  Injectable 1 milliGRAM(s) IntraMuscular once  insulin glargine Injectable (LANTUS) 21 Unit(s) SubCutaneous at bedtime  insulin lispro (ADMELOG) corrective regimen sliding scale   SubCutaneous at bedtime  insulin lispro (ADMELOG) corrective regimen sliding scale   SubCutaneous three times a day before meals  insulin lispro Injectable (ADMELOG) 7 Unit(s) SubCutaneous three times a day before meals  metoprolol tartrate 100 milliGRAM(s) Oral every 12 hours  pantoprazole    Tablet 40 milliGRAM(s) Oral before breakfast  sodium bicarbonate 650 milliGRAM(s) Oral two times a day  sodium chloride 0.9% lock flush 10 milliLiter(s) IV Push every 1 hour PRN    PE:    Vital Signs Last 24 Hrs  T(C): 36.8 (07 Feb 2023 10:14), Max: 36.9 (06 Feb 2023 17:34)  T(F): 98.2 (07 Feb 2023 10:14), Max: 98.5 (06 Feb 2023 17:34)  HR: 70 (07 Feb 2023 10:14) (66 - 72)  BP: 151/83 (07 Feb 2023 10:14) (121/75 - 151/83)  BP(mean): --  RR: 18 (07 Feb 2023 10:14) (18 - 18)  SpO2: 94% (07 Feb 2023 10:14) (94% - 97%)    Parameters below as of 07 Feb 2023 10:14  Patient On (Oxygen Delivery Method): room air    Gen: AOx3, NAD  CV: S1+S2 normal, no murmurs  Resp: Clear bilat, no resp distress  Abd: Soft, nontender, +BS  Ext: No LE edema, no wounds  : No Dowling  IV/Skin: No thrombophlebitis  Neuro: no focal deficits    LABS:                          9.9    9.75  )-----------( 361      ( 07 Feb 2023 07:31 )             32.4       02-07    139  |  101  |  26<H>  ----------------------------<  148<H>  3.9   |  25  |  2.26<H>    Ca    9.3      07 Feb 2023 07:31  Phos  4.1     02-07  Mg     1.7     02-07    TPro  6.9  /  Alb  2.9<L>  /  TBili  0.1<L>  /  DBili  x   /  AST  57<H>  /  ALT  53<H>  /  AlkPhos  430<H>  02-07          MICROBIOLOGY:  v  Catheterized Catheterized  02-02-23   No growth  --  --      Abdominal Fl Abdominal Fluid  01-25-23   Rare Candida albicans  Please Note:************************************************  GPR seen in Gram stain is non-viable after prolonged  incubation and repeated subculture.  --  Candida albicans      .Blood Blood-Peripheral  01-22-23   No Growth Final  --  --      .Blood Blood-Peripheral  01-22-23   No Growth Final  --  --      .Blood Blood-Venous  01-20-23   No Growth Final  --  --    Rapid RVP Result: NotDetec (02-06 @ 13:12)  Rapid RVP Result: NotDetec (02-01 @ 11:11)    RADIOLOGY:    < from: [facility] Hepatobiliary Imaging (02.03.23 @ 20:48) >  IMPRESSION:  1. Patent cystic duct not suggestive of acute cholecystitis.  2. Normal hepatic uptake and clearance of radiopharmaceutical.    < end of copied text >

## 2023-02-07 NOTE — DISCHARGE NOTE NURSING/CASE MANAGEMENT/SOCIAL WORK - PATIENT PORTAL LINK FT
You can access the FollowMyHealth Patient Portal offered by [facility] by registering at the following website: [email or web address]. By joining [facility]’s FollowMyHealth portal, you will also be able to view your health information using other applications (apps) compatible with our system.

## 2023-02-07 NOTE — PROGRESS NOTE ADULT - SUBJECTIVE AND OBJECTIVE BOX
Luciano Partida PGY1  pager [phone] or check resident tab for coverage    Patient is a 82y old  Female who presents with a chief complaint of acute cholecystitis, DKA (06 Feb 2023 17:06)    SUBJECTIVE / OVERNIGHT EVENTS:    NAEO.  Patient this morning is,    Brief Daily Plan:    MEDICATIONS  MEDICATIONS  (STANDING):  apixaban 2.5 milliGRAM(s) Oral two times a day  aspirin  chewable 81 milliGRAM(s) Oral daily  atorvastatin 40 milliGRAM(s) Oral at bedtime  buMETAnide 1 milliGRAM(s) Oral daily  chlorhexidine 4% Liquid 1 Application(s) Topical <User Schedule>  dextrose 5%. 1000 milliLiter(s) (100 mL/Hr) IV Continuous <Continuous>  dextrose 5%. 1000 milliLiter(s) (50 mL/Hr) IV Continuous <Continuous>  dextrose 50% Injectable 25 Gram(s) IV Push once  dextrose 50% Injectable 12.5 Gram(s) IV Push once  dextrose 50% Injectable 25 Gram(s) IV Push once  fluconAZOLE   Tablet 200 milliGRAM(s) Oral every 24 hours  gabapentin 200 milliGRAM(s) Oral daily  glucagon  Injectable 1 milliGRAM(s) IntraMuscular once  insulin glargine Injectable (LANTUS) 21 Unit(s) SubCutaneous at bedtime  insulin lispro (ADMELOG) corrective regimen sliding scale   SubCutaneous at bedtime  insulin lispro (ADMELOG) corrective regimen sliding scale   SubCutaneous three times a day before meals  insulin lispro Injectable (ADMELOG) 7 Unit(s) SubCutaneous three times a day before meals  lidocaine 2% Viscous 15 milliLiter(s) Oral four times a day  metoprolol tartrate 100 milliGRAM(s) Oral every 12 hours  piperacillin/tazobactam IVPB.. 3.375 Gram(s) IV Intermittent every 12 hours  sodium bicarbonate 650 milliGRAM(s) Oral two times a day    MEDICATIONS  (PRN):  acetaminophen     Tablet .. 650 milliGRAM(s) Oral every 6 hours PRN Mild Pain (1 - 3), Moderate Pain (4 - 6)  albuterol/ipratropium for Nebulization 3 milliLiter(s) Nebulizer every 6 hours PRN Shortness of Breath and/or Wheezing  aluminum hydroxide/magnesium hydroxide/simethicone Suspension 30 milliLiter(s) Oral every 6 hours PRN Dyspepsia  dextrose Oral Gel 15 Gram(s) Oral once PRN Blood Glucose LESS THAN 70 milliGRAM(s)/deciliter  sodium chloride 0.9% lock flush 10 milliLiter(s) IV Push every 1 hour PRN Pre/post blood products, medications, blood draw, and to maintain line patency      VITALS  Vital Signs Last 24 Hrs  T(C): 36.6 (07 Feb 2023 05:01), Max: 36.9 (06 Feb 2023 17:34)  T(F): 97.8 (07 Feb 2023 05:01), Max: 98.5 (06 Feb 2023 17:34)  HR: 72 (07 Feb 2023 05:01) (62 - 73)  BP: 146/76 (07 Feb 2023 05:01) (112/69 - 147/80)  BP(mean): --  RR: 18 (07 Feb 2023 05:01) (18 - 18)  SpO2: 95% (07 Feb 2023 05:01) (95% - 98%)    Parameters below as of 07 Feb 2023 05:01  Patient On (Oxygen Delivery Method): room air        PHYSICAL EXAM:  GENERAL: no distress  PSYCH: A&O x3  HEAD: Atraumatic, Normocephalic  NECK: Supple, No JVD  CHEST/LUNG: clear to auscultation bilaterally  HEART: regular rate and rhythm, no murmurs  ABDOMEN: nontender to palpation, no rebound tenderness/guarding  EXTREMITIES: no edema on bilateral LE  NEUROLOGY: no focal neurologic deficit  SKIN: No rashes or lesions    LABS:  All labs personally Reviewed.    RADIOLOGY & ADDITIONAL TESTS:  All imaging personally Reviewed.  Luciano Partida PGY1  pager [phone] or check resident tab for coverage    Patient is a 82y old  Female who presents with a chief complaint of acute cholecystitis, DKA (06 Feb 2023 17:06)    SUBJECTIVE / OVERNIGHT EVENTS:    NAEO.  Patient this morning is doing well. Notes abdominal pain is almost resolved, still hurts a little bit with palpation.  Denies fever, chills.  Denies shortness of breath.  Denies poor appetite.  Otherwise feels well.     Brief Daily Plan:  ·	c/w antibiotics per ID  ·	prepare for dispo to [facility]    MEDICATIONS  MEDICATIONS  (STANDING):  apixaban 2.5 milliGRAM(s) Oral two times a day  aspirin  chewable 81 milliGRAM(s) Oral daily  atorvastatin 40 milliGRAM(s) Oral at bedtime  buMETAnide 1 milliGRAM(s) Oral daily  chlorhexidine 4% Liquid 1 Application(s) Topical <User Schedule>  dextrose 5%. 1000 milliLiter(s) (100 mL/Hr) IV Continuous <Continuous>  dextrose 5%. 1000 milliLiter(s) (50 mL/Hr) IV Continuous <Continuous>  dextrose 50% Injectable 25 Gram(s) IV Push once  dextrose 50% Injectable 12.5 Gram(s) IV Push once  dextrose 50% Injectable 25 Gram(s) IV Push once  fluconAZOLE   Tablet 200 milliGRAM(s) Oral every 24 hours  gabapentin 200 milliGRAM(s) Oral daily  glucagon  Injectable 1 milliGRAM(s) IntraMuscular once  insulin glargine Injectable (LANTUS) 21 Unit(s) SubCutaneous at bedtime  insulin lispro (ADMELOG) corrective regimen sliding scale   SubCutaneous at bedtime  insulin lispro (ADMELOG) corrective regimen sliding scale   SubCutaneous three times a day before meals  insulin lispro Injectable (ADMELOG) 7 Unit(s) SubCutaneous three times a day before meals  lidocaine 2% Viscous 15 milliLiter(s) Oral four times a day  metoprolol tartrate 100 milliGRAM(s) Oral every 12 hours  piperacillin/tazobactam IVPB.. 3.375 Gram(s) IV Intermittent every 12 hours  sodium bicarbonate 650 milliGRAM(s) Oral two times a day    MEDICATIONS  (PRN):  acetaminophen     Tablet .. 650 milliGRAM(s) Oral every 6 hours PRN Mild Pain (1 - 3), Moderate Pain (4 - 6)  albuterol/ipratropium for Nebulization 3 milliLiter(s) Nebulizer every 6 hours PRN Shortness of Breath and/or Wheezing  aluminum hydroxide/magnesium hydroxide/simethicone Suspension 30 milliLiter(s) Oral every 6 hours PRN Dyspepsia  dextrose Oral Gel 15 Gram(s) Oral once PRN Blood Glucose LESS THAN 70 milliGRAM(s)/deciliter  sodium chloride 0.9% lock flush 10 milliLiter(s) IV Push every 1 hour PRN Pre/post blood products, medications, blood draw, and to maintain line patency      VITALS  Vital Signs Last 24 Hrs  T(C): 36.6 (07 Feb 2023 05:01), Max: 36.9 (06 Feb 2023 17:34)  T(F): 97.8 (07 Feb 2023 05:01), Max: 98.5 (06 Feb 2023 17:34)  HR: 72 (07 Feb 2023 05:01) (62 - 73)  BP: 146/76 (07 Feb 2023 05:01) (112/69 - 147/80)  BP(mean): --  RR: 18 (07 Feb 2023 05:01) (18 - 18)  SpO2: 95% (07 Feb 2023 05:01) (95% - 98%)    Parameters below as of 07 Feb 2023 05:01  Patient On (Oxygen Delivery Method): room air        PHYSICAL EXAM:  GENERAL: no distress  PSYCH: A&O x3  HEAD: Atraumatic, Normocephalic  NECK: Supple, No JVD  CHEST/LUNG: clear to auscultation bilaterally  HEART: regular rate and rhythm, no murmurs  ABDOMEN: mildly tender right abdomen to palpation, no rebound tenderness/guarding  EXTREMITIES: no edema on bilateral LE  NEUROLOGY: no focal neurologic deficit  SKIN: No rashes or lesions    LABS:  All labs personally Reviewed.    RADIOLOGY & ADDITIONAL TESTS:  All imaging personally Reviewed.

## 2023-02-07 NOTE — PROGRESS NOTE ADULT - SUBJECTIVE AND OBJECTIVE BOX
DATE OF SERVICE: 02-07-23 @ 06:46    Patient is a 82y old  Female who presents with a chief complaint of acute cholecystitis, DKA (07 Feb 2023 14:08)      INTERVAL HISTORY: feels ok      PHYSICAL EXAM:  T(C): 36.8 (02-07-23 @ 14:05), Max: 36.8 (02-07-23 @ 10:14)  HR: 73 (02-07-23 @ 14:05) (70 - 73)  BP: 111/65 (02-07-23 @ 14:05) (111/65 - 151/83)  RR: 18 (02-07-23 @ 14:05) (18 - 18)  SpO2: 96% (02-07-23 @ 14:05) (94% - 96%)  Wt(kg): --  I&O's Summary    06 Feb 2023 07:01  -  07 Feb 2023 07:00  --------------------------------------------------------  IN: 830 mL / OUT: 1250 mL / NET: -420 mL    07 Feb 2023 07:01  -  08 Feb 2023 06:46  --------------------------------------------------------  IN: 750 mL / OUT: 100 mL / NET: 650 mL          Appearance: In no distress	  HEENT:    PERRL, EOMI	  Cardiovascular:  S1 S2, No JVD  Respiratory: Lungs clear to auscultation	  Gastrointestinal:  Soft, Non-tender, + BS	  Vascularature:  No edema of LE  Psychiatric: Appropriate affect   Neuro: no acute focal deficits                               9.9    9.75  )-----------( 361      ( 07 Feb 2023 07:31 )             32.4     02-07    139  |  101  |  26<H>  ----------------------------<  148<H>  3.9   |  25  |  2.26<H>    Ca    9.3      07 Feb 2023 07:31  Phos  4.1     02-07  Mg     1.7     02-07    TPro  6.9  /  Alb  2.9<L>  /  TBili  0.1<L>  /  DBili  x   /  AST  57<H>  /  ALT  53<H>  /  AlkPhos  430<H>  02-07        Labs personally reviewed      ASSESSMENT/PLAN: 	    Ms. Batista displays no signs or symptoms of acute coronary ischemia or decompensated CHF.  Admission EKG is sinus with pre-existing anteroseptal q-waves.  Recent echo noted- normal LV systolic function with no hemodynamically significant valvular disease.    #Decompensated diastolic CHF-  Patient with severe pulm pressures on echo  c/w Bumex 1mg PO daily  Appears euvolemic with improving Cr. Will cont to assess daily.     #CAD s/p CABG-  No signs of active ischemic.  c/w ASA and statin  - 1/26 with c/o epigastric burning a/b episodes of emesis. ECG performed, no ischemic changes noted.   -1/28 c/o chest pain and epigastric pain similar to 2 days ago. Repeat EKG with no ischemic changes. Trop wnl.  This is most likely noncardiac in nature given patient's recent cholecystitis     #Sepsis   - 2/2 acute cholecystitis   - S/p CBD stent  - s/p IR drain 1/25  - Afebrile, WBC downtrending  - s/p ERCP 1/20 w/ stent  - perc rissa 1/25 - dislodged 2/1  - abdominal fluid cx w/ GPCs and candida  - c/w zosyn, fluconazole per ID  - plan for repeat ERCP w/ stent removal in 2 - 3 months  - follow fever/WBC curve, if c/f sepsis IR to replace dislodged rissa tube.    #SVT  - 1/26 patent HR in 130s while sleeping. Patient missed am dose of Metoprolol 150mg PO.  - Resume Metoprolol  - Adenosine 6mg IV x1 with termination of SVT  - cont to monitor on tele  - 1/27 with recurrent SVT s/p adenosine today   - EP consulted. Likely A-Tach.   - HR has been stable. Metoprolol decreased to 100mg PO BID.           Sergey Winchester DO [facility]  Cardiovascular Medicine  [address], [address]  Office: [phone]  Available via Text/call on Microsoft Teams

## 2023-02-07 NOTE — PROGRESS NOTE ADULT - ASSESSMENT
82F history of HTN, HLD, DM2, HFpEF (EF 65%), CAD s/p CABG, Breast Ca s/p R partial mastectomy, Rectal Ca s/p resection presenting with abdominal pain x 1 week found to have acute cholecystitis, not a surgical candidate. S/p ERCP with stent placement course c/b gallbladder perforation s/p perc rissa w/ fluid c/f candida and GPCs on zosyn, fluconazole; perc rissa dislodged on 2/1/23, HIDA negative for cholecystitis, no further intervention per surgery and IR. 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 New hematuria 2/2/23, resolved on 2/4/23, eliquis resumed. Pending dispo to RAMIRO.

## 2023-02-07 NOTE — PROGRESS NOTE ADULT - ATTENDING SUPERVISION STATEMENT
Fellow
Resident
Fellow
Resident
Fellow
Resident

## 2023-02-07 NOTE — DISCHARGE NOTE NURSING/CASE MANAGEMENT/SOCIAL WORK - NSDCFUADDAPPT_GEN_ALL_CORE_FT
APPTS ARE READY TO BE MADE: [ x] YES    Best Family or Patient Contact (if needed):    Additional Information about above appointments (if needed):    1: Infectious Disease with Dr. Fonseca in 1 - 2 weeks  2: Gastroenterology with Dr. Joseph Blanchard  3: PCP in 1 - 2 weeks    Other comments or requests:

## 2023-02-07 NOTE — PROGRESS NOTE ADULT - SUBJECTIVE AND OBJECTIVE BOX
EP ATTENDING    tele: NSR, no events  she denies palpitations, syncope, nor angina    DATE OF SERVICE - 02-07-23     Review of Systems:   Constitutional: [ ] fevers, [ ] chills.   Skin: [ ] dry skin. [ ] rashes.  Psychiatric: [ ] depression, [ ] anxiety.   Gastrointestinal: [ ] BRBPR, [ ] melena.   Neurological: [ ] confusion. [ ] seizures. [ ] shuffling gait.   Ears,Nose,Mouth and Throat: [ ] ear pain [ ] sore throat.   Eyes: [ ] diplopia.   Respiratory: [ ] hemoptysis. [ ] shortness of breath  Cardiovascular: See HPI above  Hematologic/Lymphatic: [ ] anemia. [ ] painful nodes. [ ] prolonged bleeding.   Genitourinary: [ ] hematuria. [ ] flank pain.   Endocrine: [ ] significant change in weight. [ ] intolerance to heat and cold.     Review of systems [ x] otherwise negative, [ ] otherwise unable to obtain    FH: no family history of sudden cardiac death in first degree relatives    SH: [ ] tobacco, [ ] alcohol, [ ] drugs    acetaminophen     Tablet .. 650 milliGRAM(s) Oral every 6 hours PRN  albuterol/ipratropium for Nebulization 3 milliLiter(s) Nebulizer every 6 hours PRN  aluminum hydroxide/magnesium hydroxide/simethicone Suspension 30 milliLiter(s) Oral every 6 hours PRN  apixaban 2.5 milliGRAM(s) Oral two times a day  aspirin  chewable 81 milliGRAM(s) Oral daily  atorvastatin 40 milliGRAM(s) Oral at bedtime  buMETAnide 1 milliGRAM(s) Oral daily  chlorhexidine 4% Liquid 1 Application(s) Topical <User Schedule>  dextrose 5%. 1000 milliLiter(s) IV Continuous <Continuous>  dextrose 5%. 1000 milliLiter(s) IV Continuous <Continuous>  dextrose 50% Injectable 25 Gram(s) IV Push once  dextrose 50% Injectable 12.5 Gram(s) IV Push once  dextrose 50% Injectable 25 Gram(s) IV Push once  dextrose Oral Gel 15 Gram(s) Oral once PRN  fluconAZOLE   Tablet 200 milliGRAM(s) Oral every 24 hours  gabapentin 200 milliGRAM(s) Oral daily  glucagon  Injectable 1 milliGRAM(s) IntraMuscular once  insulin glargine Injectable (LANTUS) 21 Unit(s) SubCutaneous at bedtime  insulin lispro (ADMELOG) corrective regimen sliding scale   SubCutaneous at bedtime  insulin lispro (ADMELOG) corrective regimen sliding scale   SubCutaneous three times a day before meals  insulin lispro Injectable (ADMELOG) 7 Unit(s) SubCutaneous three times a day before meals  magnesium sulfate  IVPB 2 Gram(s) IV Intermittent once  metoprolol tartrate 100 milliGRAM(s) Oral every 12 hours  pantoprazole    Tablet 40 milliGRAM(s) Oral before breakfast  piperacillin/tazobactam IVPB.. 3.375 Gram(s) IV Intermittent every 12 hours  potassium chloride   Powder 20 milliEquivalent(s) Oral once  sodium bicarbonate 650 milliGRAM(s) Oral two times a day  sodium chloride 0.9% lock flush 10 milliLiter(s) IV Push every 1 hour PRN                            9.9    9.75  )-----------( 361      ( 07 Feb 2023 07:31 )             32.4       02-07    139  |  101  |  26<H>  ----------------------------<  148<H>  3.9   |  25  |  2.26<H>    Ca    9.3      07 Feb 2023 07:31  Phos  4.1     02-07  Mg     1.7     02-07    TPro  6.9  /  Alb  2.9<L>  /  TBili  0.1<L>  /  DBili  x   /  AST  57<H>  /  ALT  53<H>  /  AlkPhos  430<H>  02-07      T(C): 36.6 (02-07-23 @ 05:01), Max: 36.9 (02-06-23 @ 17:34)  HR: 72 (02-07-23 @ 05:01) (62 - 73)  BP: 146/76 (02-07-23 @ 05:01) (112/69 - 147/80)  RR: 18 (02-07-23 @ 05:01) (18 - 18)  SpO2: 95% (02-07-23 @ 05:01) (95% - 98%)  Wt(kg): --    I&O's Summary    06 Feb 2023 07:01  -  07 Feb 2023 07:00  --------------------------------------------------------  IN: 830 mL / OUT: 1250 mL / NET: -420 mL      General: Well nourished in no acute distress. Alert and Oriented * 3.   Head: Normocephalic and atraumatic.   Neck: No JVD. No bruits. Supple. Does not appear to be enlarged.   Cardiovascular: + S1,S2 ; RRR Soft systolic murmur at the left lower sternal border. No rubs noted.    Lungs: CTA b/l. No rhonchi, rales or wheezes.   Abdomen: + BS, soft. Non tender. Non distended. No rebound. No guarding.   Extremities: No clubbing/cyanosis/edema.   Neurologic: Moves all four extremities. Full range of motion.   Skin: Warm and moist. The patient's skin has normal elasticity and good skin turgor.   Psychiatric: Appropriate mood and affect.  Musculoskeletal: Normal range of motion, normal strength    A/P) She is an 81 y/o female PMH advanced CKD, hypertension, hyperlipidemia, diabetes, CAD s/p CABG (11/2020) with normal LVEF admitted on 1/18/23 with acute cholecystitis requiring ERCP and percutaneous drain. On 1/27 she went into atrial flutter (difficult to tell if typical or atypical) and has since terminated to NSR. Her other medical history includes right breast CA s/p mastectomy (1989), rectal CA (2015).    -continue eliquis for lifelong anticoagulation for stroke prevention  -continue metoprolol 100 bid   -if AF/AFL recurs will start amiodarone  -poor candidate for ablation at this time  -continue aspirin and lipitor for CAD s/p CABG    Ld Lowery M.D.  Cardiac Electrophysiology  [phone]

## 2023-02-07 NOTE — PROGRESS NOTE ADULT - ASSESSMENT
82 year old female with HTN, HLD, DM2, HFpEF, breast cancer s/p R partial mastectomy, rectal carcinoma s/p resection, CAD/ CABG presenting with abd pain, nausea and vomiting, found to have acute cholecystitis with choledocholithiasis. Underwent ERCP with biliary stent placement with repeat CT A/P with new fluid collection near the GB neck s/p perc rissa on 1/25 - culture with yeast. Leukocytosis overall better but remains elevated. Repeat CT with slight interval increase in GB neck fluid collection with drainage catheter in the RUQ adjacent to the GB and with new R perinephric fluid and loculated R RP fluid collection. No longer with biliary drain. LFTs and WBC improving.    Recommend:  #Acute cholecystitis with choledocholithiasis/ abscess  -Continue zosyn, patient tolerating  -Continue fluconazole 200 mg po q 24 hours  -Monitor for fevers  -Given with abd fluid collection on last ct, would continue augmentin 500 mg PO BID and fluconazole x 1 week on discharge  -Pt can followup in ID clinic in 1-2 weeks for repeat CT.  -For appointments can call [phone].    #Oral lesions, vesicular lesions  -Completed acyclovir    Dk Fonseca MD  Available through MS Teams  If no response, or after 5pm/weekends, call [phone]    Plan discussed with primary team.

## 2023-02-07 NOTE — DISCHARGE NOTE NURSING/CASE MANAGEMENT/SOCIAL WORK - NSDCVIVACCINE_GEN_ALL_CORE_FT
COVID-19, mRNA, LNP-S, PF, 30 mcg/0.3 mL dose (Pfizer); 27-Jul-2021 15:42; [place] Garcia); [employer]; [id number] (Exp. Date: 31-Aug-2021); IntraMuscular; Deltoid Left.; 0.3 milliLiter(s);

## 2023-02-07 NOTE — DISCHARGE NOTE NURSING/CASE MANAGEMENT/SOCIAL WORK - NSDCPEFALRISK_GEN_ALL_CORE
For information on Fall & Injury Prevention, visit: [email or web address] OR  [email or web address] OR  https://www.cdc.gov/steadi/patient.html

## 2023-02-07 NOTE — PROGRESS NOTE ADULT - REASON FOR ADMISSION
acute cholecystitis, DKA
acute cholecystitis
acute cholecystitis, DKA

## 2023-02-07 NOTE — PROGRESS NOTE ADULT - PROVIDER SPECIALTY LIST ADULT
Cardiology
Electrophysiology
Infectious Disease
Internal Medicine
Intervent Radiology
Nephrology
Nephrology
Surgery
Cardiology
Electrophysiology
Gastroenterology
Infectious Disease
Intervent Radiology
Nephrology
Urology
Cardiology
Electrophysiology
Gastroenterology
Infectious Disease
Infectious Disease
Internal Medicine
Nephrology
Surgery
Urology
Gastroenterology
Gastroenterology
Infectious Disease
Infectious Disease
Internal Medicine
Nephrology
Surgery
Surgery
Intervent Radiology
Internal Medicine
Intervent Radiology
Internal Medicine

## 2023-02-07 NOTE — PROGRESS NOTE ADULT - PROBLEM SELECTOR PROBLEM 1
Acute cholecystitis

## 2023-02-07 NOTE — PROGRESS NOTE ADULT - ASSESSMENT
seen and examined at  the bedside ,   no distress      acetaminophen     Tablet .. 650 milliGRAM(s) Oral every 6 hours PRN  albuterol/ipratropium for Nebulization 3 milliLiter(s) Nebulizer every 6 hours PRN  aluminum hydroxide/magnesium hydroxide/simethicone Suspension 30 milliLiter(s) Oral every 6 hours PRN  apixaban 2.5 milliGRAM(s) Oral two times a day  aspirin  chewable 81 milliGRAM(s) Oral daily  atorvastatin 40 milliGRAM(s) Oral at bedtime  buMETAnide 1 milliGRAM(s) Oral daily  chlorhexidine 4% Liquid 1 Application(s) Topical <User Schedule>  dextrose 5%. 1000 milliLiter(s) IV Continuous <Continuous>  dextrose 5%. 1000 milliLiter(s) IV Continuous <Continuous>  dextrose 50% Injectable 25 Gram(s) IV Push once  dextrose 50% Injectable 12.5 Gram(s) IV Push once  dextrose 50% Injectable 25 Gram(s) IV Push once  dextrose Oral Gel 15 Gram(s) Oral once PRN  fluconAZOLE   Tablet 200 milliGRAM(s) Oral every 24 hours  gabapentin 200 milliGRAM(s) Oral daily  glucagon  Injectable 1 milliGRAM(s) IntraMuscular once  insulin glargine Injectable (LANTUS) 21 Unit(s) SubCutaneous at bedtime  insulin lispro (ADMELOG) corrective regimen sliding scale   SubCutaneous at bedtime  insulin lispro (ADMELOG) corrective regimen sliding scale   SubCutaneous three times a day before meals  insulin lispro Injectable (ADMELOG) 7 Unit(s) SubCutaneous three times a day before meals  lidocaine 2% Viscous 15 milliLiter(s) Oral four times a day  metoprolol tartrate 100 milliGRAM(s) Oral every 12 hours  piperacillin/tazobactam IVPB.. 3.375 Gram(s) IV Intermittent every 12 hours  sodium bicarbonate 650 milliGRAM(s) Oral two times a day  sodium chloride 0.9% lock flush 10 milliLiter(s) IV Push every 1 hour PRN      VITAL:  T(C): , Max: 37.1 (02-03-23 @ 21:55)  T(F): , Max: 98.7 (02-03-23 @ 21:55)  HR: 79 (02-04-23 @ 10:07)  BP: 145/74 (02-04-23 @ 10:07)  BP(mean): --  RR: 18 (02-04-23 @ 10:07)  SpO2: 98% (02-04-23 @ 10:07)  Wt(kg): --    02-03-23 @ 07:01  -  02-04-23 @ 07:00  --------------------------------------------------------  IN: 980 mL / OUT: 1400 mL / NET: -420 mL    02-04-23 @ 07:01  -  02-04-23 @ 12:52  --------------------------------------------------------  IN: 400 mL / OUT: 1300 mL / NET: -900 mL        PHYSICAL EXAM:  General: NAD; Alert  HEENT:  NCAT; PERRLA  Neck: No JVD; supple  Respiratory: CTA-b/l  Cardiac: RRR s1s2  Gastrointestinal: BS+, soft, NT/ND , biliary drain in placed   Urologic: No chen  Extremities: peripheral edema, lymphedema at baseline       LABS:                          9.6    12.04 )-----------( 452      ( 04 Feb 2023 07:24 )             31.7     Na(139)/K(4.0)/Cl(105)/HCO3(22)/BUN(34)/Cr(2.34)Glu(274)/Ca(8.9)/Mg(2.0)/PO4(4.1)    02-04 @ 07:25  Na(139)/K(3.9)/Cl(107)/HCO3(20)/BUN(39)/Cr(2.59)Glu(196)/Ca(8.7)/Mg(1.8)/PO4(4.1)    02-03 @ 09:03  Na(136)/K(3.9)/Cl(105)/HCO3(19)/BUN(45)/Cr(2.87)Glu(101)/Ca(8.9)/Mg(1.9)/PO4(4.2)    02-02 @ 07:14            ASSESSMENT/PLAN  Patient is a 80 year old F with a PMHx of HTN, HLD, T2DM, HFpEF (EF 65% X/2022), breast cancer s/p R partial mastectomy, rectal carcinoma s/p resection, CAD s/p CABG (11/2020) who presents for 1 day of progressively worsening abdominal pain associated with nausea and NBNB vomiting. 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,  nephrology awared , previous diuretic test showed minimal  function of that kidney)  non oliguric harleen likely in the view of relative  hypotension /hemodynamic --- cr still elevated   hyponatremia--  resolved  CVS----blood pressure stable as of late   GI:  acute cholecystitis --- ERCP with stent placement  1/20/23  cholecystotomy tube placement  1/25/23 perc , removal of perc 2/1/23    HIDA 2/3 negative, patent cystic duct  right abdomen TTP c/f bile peritonitis, non-operable per surgery  -metabolic acidosis --resolved   -hyperphosphatemia - improved  -potassium borderline     RECOMMENDATION:  1)Renal: renal fxn improved   non oliguric harleen  a-w potassium repletion   avoid nephrotoxic medication  dose all medications for gfr ~10-15 ml/min   cont  bumex 1 mg daily .  cont  bicarb 650 mg bid   daily bmp,   renal diet   2)CVS: BP stable cont  metoprolol 100 mg po bid.  3) ID: on zosyn and fluconazole   4) GI on board  5) urology on board          [facility]  Office: [phone]

## 2023-02-09 NOTE — PATIENT PROFILE ADULT - PUBLIC BENEFITS
No no Tissue Cultured Epidermal Autograft Text: The defect edges were debeveled with a #15 scalpel blade.  Given the location of the defect, shape of the defect and the proximity to free margins a tissue cultured epidermal autograft was deemed most appropriate.  The graft was then trimmed to fit the size of the defect.  The graft was then placed in the primary defect and oriented appropriately.

## 2023-02-17 ENCOUNTER — NON-APPOINTMENT (OUTPATIENT)
Age: 83
End: 2023-02-17

## 2023-02-21 ENCOUNTER — LABORATORY RESULT (OUTPATIENT)
Age: 83
End: 2023-02-21

## 2023-02-21 ENCOUNTER — APPOINTMENT (OUTPATIENT)
Dept: GASTROENTEROLOGY | Facility: CLINIC | Age: 83
End: 2023-02-21
Payer: MEDICARE

## 2023-02-21 ENCOUNTER — APPOINTMENT (OUTPATIENT)
Dept: INFECTIOUS DISEASE | Facility: CLINIC | Age: 83
End: 2023-02-21
Payer: MEDICARE

## 2023-02-21 VITALS
TEMPERATURE: 97.5 F | WEIGHT: 180 LBS | BODY MASS INDEX: 33.99 KG/M2 | DIASTOLIC BLOOD PRESSURE: 76 MMHG | HEIGHT: 61 IN | HEART RATE: 79 BPM | OXYGEN SATURATION: 99 % | SYSTOLIC BLOOD PRESSURE: 127 MMHG

## 2023-02-21 VITALS
HEART RATE: 79 BPM | WEIGHT: 180 LBS | HEIGHT: 61 IN | OXYGEN SATURATION: 98 % | SYSTOLIC BLOOD PRESSURE: 137 MMHG | DIASTOLIC BLOOD PRESSURE: 71 MMHG | BODY MASS INDEX: 33.99 KG/M2 | TEMPERATURE: 97.5 F

## 2023-02-21 DIAGNOSIS — Z79.01 LONG TERM (CURRENT) USE OF ANTICOAGULANTS: ICD-10-CM

## 2023-02-21 DIAGNOSIS — R10.10 UPPER ABDOMINAL PAIN, UNSPECIFIED: ICD-10-CM

## 2023-02-21 DIAGNOSIS — K81.9 CHOLECYSTITIS, UNSPECIFIED: ICD-10-CM

## 2023-02-21 DIAGNOSIS — I48.92 UNSPECIFIED ATRIAL FLUTTER: ICD-10-CM

## 2023-02-21 LAB
ALBUMIN SERPL ELPH-MCNC: 3.7 G/DL
ALP BLD-CCNC: 340 U/L
ALT SERPL-CCNC: 84 U/L
ANION GAP SERPL CALC-SCNC: 18 MMOL/L
AST SERPL-CCNC: 84 U/L
BASOPHILS # BLD AUTO: 0.1 K/UL
BASOPHILS NFR BLD AUTO: 0.9 %
BILIRUB SERPL-MCNC: 0.3 MG/DL
BUN SERPL-MCNC: 21 MG/DL
CALCIUM SERPL-MCNC: 9.9 MG/DL
CHLORIDE SERPL-SCNC: 98 MMOL/L
CO2 SERPL-SCNC: 24 MMOL/L
CREAT SERPL-MCNC: 1.85 MG/DL
CRP SERPL-MCNC: 88 MG/L
EGFR: 26 ML/MIN/1.73M2
EOSINOPHIL # BLD AUTO: 0.05 K/UL
EOSINOPHIL NFR BLD AUTO: 0.5 %
ERYTHROCYTE [SEDIMENTATION RATE] IN BLOOD BY WESTERGREN METHOD: 60 MM/HR
GLUCOSE SERPL-MCNC: 176 MG/DL
HCT VFR BLD CALC: 38.1 %
HGB BLD-MCNC: 11.1 G/DL
IMM GRANULOCYTES NFR BLD AUTO: 0.4 %
LYMPHOCYTES # BLD AUTO: 1.41 K/UL
LYMPHOCYTES NFR BLD AUTO: 13 %
MAN DIFF?: NORMAL
MCHC RBC-ENTMCNC: 28.5 PG
MCHC RBC-ENTMCNC: 29.1 GM/DL
MCV RBC AUTO: 97.9 FL
MONOCYTES # BLD AUTO: 0.82 K/UL
MONOCYTES NFR BLD AUTO: 7.5 %
NEUTROPHILS # BLD AUTO: 8.45 K/UL
NEUTROPHILS NFR BLD AUTO: 77.7 %
PLATELET # BLD AUTO: 387 K/UL
POTASSIUM SERPL-SCNC: 4.4 MMOL/L
PROT SERPL-MCNC: 7.9 G/DL
RBC # BLD: 3.89 M/UL
RBC # FLD: 17.2 %
SODIUM SERPL-SCNC: 140 MMOL/L
WBC # FLD AUTO: 10.87 K/UL

## 2023-02-21 PROCEDURE — 99215 OFFICE O/P EST HI 40 MIN: CPT

## 2023-02-21 PROCEDURE — 99214 OFFICE O/P EST MOD 30 MIN: CPT

## 2023-02-21 NOTE — HISTORY OF PRESENT ILLNESS
[FreeTextEntry1] : 83 year old female with HTN, HLD, DM2, HFpEF, breast cancer s/p R partial mastectomy, rectal carcinoma s/p resection, CAD/ CABG presenting with abd pain, nausea and vomiting, found to have acute cholecystitis with choledocholithiasis. Underwent ERCP with biliary stent placement with repeat CT A/P with new fluid collection near the GB neck s/p perc rissa on 1/25 - culture with yeast. Leukocytosis overall better. Repeat CT with slight interval increase in GB neck fluid collection with drainage catheter in the RUQ adjacent to the GB and with new R perinephric fluid and loculated R RP fluid collection. No longer with biliary drain. LFTs improving. T bili normal.\par \par Patient currently at [facility]. Has occasional epigastric pain and bloating, decreased appetite. Seeing GI this afternoon. Otherwise feels well. No other complaints. No RUQ pain. No fevers, no chills. Normal bowel movements.\par \par

## 2023-02-21 NOTE — COUNSELING
Noted, thank you very much.   Recommendations conveyed to patient below.    [Fall prevention counseling provided] : Fall prevention counseling provided [None] : None [Use recommended devices] : Use recommended devices [Good understanding] : Patient has a good understanding of lifestyle changes and steps needed to achieve self management goal

## 2023-02-21 NOTE — ASSESSMENT
[FreeTextEntry1] : 83 year old female with HTN, HLD, DM2, HFpEF, breast cancer s/p R partial mastectomy, rectal carcinoma s/p resection, CAD/ CABG presenting with abd pain, nausea and vomiting, found to have acute cholecystitis with choledocholithiasis. Underwent ERCP with biliary stent placement with repeat CT A/P with new fluid collection near the GB neck s/p perc rissa on 1/25 - culture with yeast. Leukocytosis overall better. Repeat CT with slight interval increase in GB neck fluid collection with drainage catheter in the RUQ adjacent to the GB and with new R perinephric fluid and loculated R RP fluid collection. No longer with biliary drain. LFTs improving. T bili normal.\par \par Patient currently at [facility]. Has occasional epigastric pain and bloating, decreased appetite. Seeing GI this afternoon. Otherwise feels well. No other complaints. No RUQ pain. No fevers, no chills. Normal bowel movements.\par \par Recommend:\par #Acute cholecystitis with choledocholithiasis/ abscess\par -Asymptomatic, no RUQ pain\par -Remains on augmentin and fluconazole - can discontinue and monitor off antibiotics\par -Monitor for fevers\par -Check CBC/ CMP/ ESR/ CRP today - if abnormal may need repeat CT A/P without contrast\par -Pt can followup in ID clinic in 2 weeks \par \par #CKD\par -Monitor Cr - CMP today\par \par #Epigastric pain\par -Seeing GI this afternoon - can consider trial of PPI\par \par Dk Fonseca MD\par Division of Infectious Disease\par

## 2023-02-21 NOTE — PHYSICAL EXAM
[General Appearance - Alert] : alert [General Appearance - In No Acute Distress] : in no acute distress [General Appearance - Well Nourished] : well nourished [General Appearance - Well-Appearing] : healthy appearing [PERRL With Normal Accommodation] : pupils were equal in size, round, reactive to light [Extraocular Movements] : extraocular movements were intact [Outer Ear] : the ears and nose were normal in appearance [Hearing Threshold Finger Rub Not [place]] : hearing was normal [Neck Appearance] : the appearance of the neck was normal [Neck Cervical Mass (___cm)] : no neck mass was observed [Respiration, Rhythm And Depth] : normal respiratory rhythm and effort [Exaggerated Use Of Accessory Muscles For Inspiration] : no accessory muscle use [Auscultation Breath Sounds / Voice Sounds] : lungs were clear to auscultation bilaterally [Heart Rate And Rhythm] : heart rate was normal and rhythm regular [Heart Sounds] : normal S1 and S2 [Murmurs] : no murmurs [Edema] : there was no peripheral edema [Bowel Sounds] : normal bowel sounds [Abdomen Soft] : soft [Abdomen Tenderness] : non-tender [No Palpable Adenopathy] : no palpable adenopathy [Musculoskeletal - Swelling] : no joint swelling [Range of Motion to Joints] : range of motion to joints [Skin Color & Pigmentation] : normal skin color and pigmentation [] : no rash [Sensation] : the sensory exam was normal to light touch and pinprick [Motor Exam] : the motor exam was normal [No Focal Deficits] : no focal deficits [Oriented To Time, Place, And Person] : oriented to person, place, and time [Affect] : the affect was normal [FreeTextEntry1] : No RUQ tenderness

## 2023-02-21 NOTE — REASON FOR VISIT
[Post Hospitalization] : a post hospitalization visit [Family Member] : family member [FreeTextEntry1] : Acute cholecystitis

## 2023-02-22 DIAGNOSIS — Z01.812 ENCOUNTER FOR PREPROCEDURAL LABORATORY EXAMINATION: ICD-10-CM

## 2023-02-22 DIAGNOSIS — Z46.89 ENCOUNTER FOR FITTING AND ADJUSTMENT OF OTHER SPECIFIED DEVICES: ICD-10-CM

## 2023-02-24 NOTE — PATIENT PROFILE ADULT - NSPROPTRIGHTSUPPORTPERSON_GEN_A_NUR
Received fax from [employer] [facility] requesting [address] 18 MG/3ML SOPN SC injection       Last Office Visit  -  8/31/21  Next Office Visit  -  None You can access the FollowMyHealth Patient Portal offered by [facility] by registering at the following website: [email or web address] By joining [email or web address] [email or web address] portal, you will also be able to view your health information using other applications (apps) compatible with our system. same name as above

## 2023-02-25 PROBLEM — K81.9 CHOLECYSTITIS: Status: ACTIVE | Noted: 2023-02-21

## 2023-02-25 PROBLEM — R10.10 PAIN LOCALIZED TO UPPER ABDOMEN: Status: ACTIVE | Noted: 2023-02-25

## 2023-02-25 PROBLEM — I48.92 ATRIAL FLUTTER, UNSPECIFIED TYPE: Status: ACTIVE | Noted: 2023-02-25

## 2023-02-25 PROBLEM — Z79.01 LONG TERM CURRENT USE OF ANTICOAGULANT THERAPY: Status: ACTIVE | Noted: 2019-02-21

## 2023-02-25 NOTE — CONSULT LETTER
[Dear  ___] : Dear  [unfilled], [Consult Letter:] : I had the pleasure of evaluating your patient, [unfilled]. [Please see my note below.] : Please see my note below. [Consult Closing:] : Thank you very much for allowing me to participate in the care of this patient.  If you have any questions, please do not hesitate to contact me. [Sincerely,] : Sincerely, [FreeTextEntry3] : Joseph Blanchard MD, MPH, GUSTAVO, MADDIE\par Chief of Clinical Quality in Gastroenterology, [facility]\par Associate Chief of Gastroenterology, [facility]\par Interim Director of Endoscopy, [facility]\par Director of Endoscopic Ultrasound, [facility]\par [address], [address]\par [place], [address]\par 24 hours [phone]\par \par  [DrTaryn  ___] : Dr. RAMIREZ [DrTaryn ___] : Dr. RAMIREZ

## 2023-02-25 NOTE — REVIEW OF SYSTEMS
[Fever] : no fever [Chills] : no chills [Chest Pain] : no chest pain [FreeTextEntry6] : Reports SOB when ambulating and  recalls it occurred twice at night  [FreeTextEntry7] : epigastric burning , nausea when eating, has bowel movement after each meal  [FreeTextEntry9] : Currently at [facility] since hospital discharge  where she is working on ambulating with walker

## 2023-02-25 NOTE — PHYSICAL EXAM
[Alert] : alert [No Acute Distress] : no acute distress [Sclera] : the sclera and conjunctiva were normal [Normal Appearance] : the appearance of the neck was normal [No Respiratory Distress] : no respiratory distress [Auscultation Breath Sounds / Voice Sounds] : lungs were clear to auscultation bilaterally [Normal S1, S2] : normal S1 and S2 [Abdomen Tenderness] : non-tender [Normal Color / Pigmentation] : normal skin color and pigmentation [No Focal Deficits] : no focal deficits [Normal Affect] : the affect was normal [Normal Mood] : the mood was normal [de-identified] : Face mask in place  [de-identified] : Lymphedema noted right UE [de-identified] : Arrived sitting  in wheelchair , ambulates with walker

## 2023-02-25 NOTE — ASSESSMENT
[FreeTextEntry1] : Impression;\par \par #1.  Upper abd discomfort/nausea/solid BM postprandially.  Likely due to antimicrobials, r/o pancreatitis/recurrent cholecystitis/doubt cholangitis.\par \par #2.  Large CBD stone/ s/p ERCP/stent Jan 2023\par #3.  CAD s/p CABG Nov 2020 on aspirin\par #4.  Acute cholecystitis s/p percutaneous cholecystostomy tube, dislodged 2/1/23\par #5.  Atrial flutter on Eliquis for CVA prophylaxis\par #6.  CKD, advanced.\par #7.  DM\par \par \par Plan:\par \par #1.  Labs:  CBC/CMP/amylase/lipase\par #2.  COVID-19 preprocedure\par #3.  ERCP: late March early April 2023 ERCP/lithotripsy.  Likely will need more than 1 ERCP.\par Risks, benefits, alternatives of the procedure were discussed with the patient and the patient was educated about the procedure. Risks include, but are not limited to, pancreatitis, bleeding, infection, injury to internal organs, possible need for further procedures including emergency surgery, missed lesions, risk of anesthesia, and risk of IV site problems.  Patient understands and agrees to proceed.\par #4.  Presurgical testing appointment requested\par #5.  Hold Eliquis with cardiology permission 2 days preprocedure\par #6.  Continue aspirin without interruption\par \par \par \par

## 2023-03-13 NOTE — CONSULT NOTE ADULT - NEGATIVE GENERAL GENITOURINARY SYMPTOMS
no dysuria Isotretinoin Counseling: Patient should get monthly blood tests, not donate blood, not drive at night if vision affected, not share medication, and not undergo elective surgery for 6 months after tx completed. Side effects reviewed, pt to contact office should one occur.

## 2023-03-19 ENCOUNTER — RESULT REVIEW (OUTPATIENT)
Age: 83
End: 2023-03-19

## 2023-03-22 ENCOUNTER — INPATIENT (INPATIENT)
Facility: HOSPITAL | Age: 83
LOS: 7 days | Discharge: NURSING, REHAB OR LONG-TERM CARE | DRG: 682 | End: 2023-03-30
Attending: STUDENT IN AN ORGANIZED HEALTH CARE EDUCATION/TRAINING PROGRAM | Admitting: HOSPITALIST
Payer: MEDICARE

## 2023-03-22 VITALS
SYSTOLIC BLOOD PRESSURE: 110 MMHG | HEART RATE: 72 BPM | TEMPERATURE: 98 F | DIASTOLIC BLOOD PRESSURE: 66 MMHG | RESPIRATION RATE: 22 BRPM | OXYGEN SATURATION: 97 %

## 2023-03-22 DIAGNOSIS — R10.9 UNSPECIFIED ABDOMINAL PAIN: ICD-10-CM

## 2023-03-22 DIAGNOSIS — I50.9 HEART FAILURE, UNSPECIFIED: ICD-10-CM

## 2023-03-22 DIAGNOSIS — I48.0 PAROXYSMAL ATRIAL FIBRILLATION: ICD-10-CM

## 2023-03-22 DIAGNOSIS — Z93.2 ILEOSTOMY STATUS: Chronic | ICD-10-CM

## 2023-03-22 DIAGNOSIS — E11.9 TYPE 2 DIABETES MELLITUS WITHOUT COMPLICATIONS: ICD-10-CM

## 2023-03-22 DIAGNOSIS — Z96.651 PRESENCE OF RIGHT ARTIFICIAL KNEE JOINT: Chronic | ICD-10-CM

## 2023-03-22 DIAGNOSIS — Z98.89 OTHER SPECIFIED POSTPROCEDURAL STATES: Chronic | ICD-10-CM

## 2023-03-22 DIAGNOSIS — Z90.11 ACQUIRED ABSENCE OF RIGHT BREAST AND NIPPLE: Chronic | ICD-10-CM

## 2023-03-22 DIAGNOSIS — I25.10 ATHEROSCLEROTIC HEART DISEASE OF NATIVE CORONARY ARTERY WITHOUT ANGINA PECTORIS: ICD-10-CM

## 2023-03-22 DIAGNOSIS — N39.0 URINARY TRACT INFECTION, SITE NOT SPECIFIED: ICD-10-CM

## 2023-03-22 DIAGNOSIS — Z90.49 ACQUIRED ABSENCE OF OTHER SPECIFIED PARTS OF DIGESTIVE TRACT: Chronic | ICD-10-CM

## 2023-03-22 DIAGNOSIS — Z29.9 ENCOUNTER FOR PROPHYLACTIC MEASURES, UNSPECIFIED: ICD-10-CM

## 2023-03-22 DIAGNOSIS — I47.1 SUPRAVENTRICULAR TACHYCARDIA: ICD-10-CM

## 2023-03-22 DIAGNOSIS — I50.33 ACUTE ON CHRONIC DIASTOLIC (CONGESTIVE) HEART FAILURE: ICD-10-CM

## 2023-03-22 DIAGNOSIS — R06.02 SHORTNESS OF BREATH: ICD-10-CM

## 2023-03-22 LAB
ALBUMIN SERPL ELPH-MCNC: 3.2 G/DL — LOW (ref 3.3–5)
ALP SERPL-CCNC: 88 U/L — SIGNIFICANT CHANGE UP (ref 40–120)
ALT FLD-CCNC: 24 U/L — SIGNIFICANT CHANGE UP (ref 10–45)
ANION GAP SERPL CALC-SCNC: 8 MMOL/L — SIGNIFICANT CHANGE UP (ref 5–17)
APPEARANCE UR: CLEAR — SIGNIFICANT CHANGE UP
APTT BLD: 29.5 SEC — SIGNIFICANT CHANGE UP (ref 27.5–35.5)
AST SERPL-CCNC: 30 U/L — SIGNIFICANT CHANGE UP (ref 10–40)
BACTERIA # UR AUTO: ABNORMAL
BASE EXCESS BLDV CALC-SCNC: 13.6 MMOL/L — HIGH (ref -2–3)
BASOPHILS # BLD AUTO: 0.03 K/UL — SIGNIFICANT CHANGE UP (ref 0–0.2)
BASOPHILS NFR BLD AUTO: 0.2 % — SIGNIFICANT CHANGE UP (ref 0–2)
BILIRUB SERPL-MCNC: 0.3 MG/DL — SIGNIFICANT CHANGE UP (ref 0.2–1.2)
BILIRUB UR-MCNC: NEGATIVE — SIGNIFICANT CHANGE UP
BUN SERPL-MCNC: 24 MG/DL — HIGH (ref 7–23)
CA-I SERPL-SCNC: 1.18 MMOL/L — SIGNIFICANT CHANGE UP (ref 1.15–1.33)
CALCIUM SERPL-MCNC: 9 MG/DL — SIGNIFICANT CHANGE UP (ref 8.4–10.5)
CHLORIDE BLDV-SCNC: 100 MMOL/L — SIGNIFICANT CHANGE UP (ref 96–108)
CHLORIDE SERPL-SCNC: 100 MMOL/L — SIGNIFICANT CHANGE UP (ref 96–108)
CO2 BLDV-SCNC: 42 MMOL/L — HIGH (ref 22–26)
CO2 SERPL-SCNC: 35 MMOL/L — HIGH (ref 22–31)
COLOR SPEC: COLORLESS — SIGNIFICANT CHANGE UP
CREAT SERPL-MCNC: 1.41 MG/DL — HIGH (ref 0.5–1.3)
DIFF PNL FLD: NEGATIVE — SIGNIFICANT CHANGE UP
EGFR: 37 ML/MIN/1.73M2 — LOW
EOSINOPHIL # BLD AUTO: 0.02 K/UL — SIGNIFICANT CHANGE UP (ref 0–0.5)
EOSINOPHIL NFR BLD AUTO: 0.2 % — SIGNIFICANT CHANGE UP (ref 0–6)
EPI CELLS # UR: 1 /HPF — SIGNIFICANT CHANGE UP
FLUAV AG NPH QL: SIGNIFICANT CHANGE UP
FLUBV AG NPH QL: SIGNIFICANT CHANGE UP
GAS PNL BLDV: 139 MMOL/L — SIGNIFICANT CHANGE UP (ref 136–145)
GAS PNL BLDV: SIGNIFICANT CHANGE UP
GAS PNL BLDV: SIGNIFICANT CHANGE UP
GLUCOSE BLDC GLUCOMTR-MCNC: 148 MG/DL — HIGH (ref 70–99)
GLUCOSE BLDC GLUCOMTR-MCNC: 234 MG/DL — HIGH (ref 70–99)
GLUCOSE BLDV-MCNC: 118 MG/DL — HIGH (ref 70–99)
GLUCOSE SERPL-MCNC: 118 MG/DL — HIGH (ref 70–99)
GLUCOSE UR QL: NEGATIVE — SIGNIFICANT CHANGE UP
HCO3 BLDV-SCNC: 40 MMOL/L — HIGH (ref 22–29)
HCT VFR BLD CALC: 33.1 % — LOW (ref 34.5–45)
HCT VFR BLDA CALC: 31 % — LOW (ref 34.5–46.5)
HGB BLD CALC-MCNC: 10.2 G/DL — LOW (ref 11.7–16.1)
HGB BLD-MCNC: 9.9 G/DL — LOW (ref 11.5–15.5)
IMM GRANULOCYTES NFR BLD AUTO: 0.5 % — SIGNIFICANT CHANGE UP (ref 0–0.9)
INR BLD: 1.36 RATIO — HIGH (ref 0.88–1.16)
KETONES UR-MCNC: NEGATIVE — SIGNIFICANT CHANGE UP
LACTATE BLDV-MCNC: 1.1 MMOL/L — SIGNIFICANT CHANGE UP (ref 0.5–2)
LEUKOCYTE ESTERASE UR-ACNC: ABNORMAL
LIDOCAIN IGE QN: 16 U/L — SIGNIFICANT CHANGE UP (ref 7–60)
LYMPHOCYTES # BLD AUTO: 1.54 K/UL — SIGNIFICANT CHANGE UP (ref 1–3.3)
LYMPHOCYTES # BLD AUTO: 12.7 % — LOW (ref 13–44)
MAGNESIUM SERPL-MCNC: 2.1 MG/DL — SIGNIFICANT CHANGE UP (ref 1.6–2.6)
MCHC RBC-ENTMCNC: 29.1 PG — SIGNIFICANT CHANGE UP (ref 27–34)
MCHC RBC-ENTMCNC: 29.9 GM/DL — LOW (ref 32–36)
MCV RBC AUTO: 97.4 FL — SIGNIFICANT CHANGE UP (ref 80–100)
MONOCYTES # BLD AUTO: 0.73 K/UL — SIGNIFICANT CHANGE UP (ref 0–0.9)
MONOCYTES NFR BLD AUTO: 6 % — SIGNIFICANT CHANGE UP (ref 2–14)
NEUTROPHILS # BLD AUTO: 9.74 K/UL — HIGH (ref 1.8–7.4)
NEUTROPHILS NFR BLD AUTO: 80.4 % — HIGH (ref 43–77)
NITRITE UR-MCNC: POSITIVE
NRBC # BLD: 0 /100 WBCS — SIGNIFICANT CHANGE UP (ref 0–0)
NT-PROBNP SERPL-SCNC: 3205 PG/ML — HIGH (ref 0–300)
PCO2 BLDV: 62 MMHG — HIGH (ref 39–42)
PH BLDV: 7.42 — SIGNIFICANT CHANGE UP (ref 7.32–7.43)
PH UR: 7.5 — SIGNIFICANT CHANGE UP (ref 5–8)
PHOSPHATE SERPL-MCNC: 3 MG/DL — SIGNIFICANT CHANGE UP (ref 2.5–4.5)
PLATELET # BLD AUTO: 426 K/UL — HIGH (ref 150–400)
PO2 BLDV: 58 MMHG — HIGH (ref 25–45)
POTASSIUM BLDV-SCNC: 3.7 MMOL/L — SIGNIFICANT CHANGE UP (ref 3.5–5.1)
POTASSIUM SERPL-MCNC: 4.1 MMOL/L — SIGNIFICANT CHANGE UP (ref 3.5–5.3)
POTASSIUM SERPL-SCNC: 4.1 MMOL/L — SIGNIFICANT CHANGE UP (ref 3.5–5.3)
PROT SERPL-MCNC: 6.9 G/DL — SIGNIFICANT CHANGE UP (ref 6–8.3)
PROT UR-MCNC: NEGATIVE — SIGNIFICANT CHANGE UP
PROTHROM AB SERPL-ACNC: 15.7 SEC — HIGH (ref 10.5–13.4)
RAPID RVP RESULT: SIGNIFICANT CHANGE UP
RBC # BLD: 3.4 M/UL — LOW (ref 3.8–5.2)
RBC # FLD: 15.5 % — HIGH (ref 10.3–14.5)
RBC CASTS # UR COMP ASSIST: 2 /HPF — SIGNIFICANT CHANGE UP (ref 0–4)
RSV RNA NPH QL NAA+NON-PROBE: SIGNIFICANT CHANGE UP
SAO2 % BLDV: 89.9 % — HIGH (ref 67–88)
SARS-COV-2 RNA SPEC QL NAA+PROBE: SIGNIFICANT CHANGE UP
SARS-COV-2 RNA SPEC QL NAA+PROBE: SIGNIFICANT CHANGE UP
SODIUM SERPL-SCNC: 143 MMOL/L — SIGNIFICANT CHANGE UP (ref 135–145)
SP GR SPEC: 1.01 — LOW (ref 1.01–1.02)
TROPONIN T, HIGH SENSITIVITY RESULT: 21 NG/L — SIGNIFICANT CHANGE UP (ref 0–51)
UROBILINOGEN FLD QL: NEGATIVE — SIGNIFICANT CHANGE UP
WBC # BLD: 12.12 K/UL — HIGH (ref 3.8–10.5)
WBC # FLD AUTO: 12.12 K/UL — HIGH (ref 3.8–10.5)
WBC UR QL: 8 /HPF — HIGH (ref 0–5)

## 2023-03-22 PROCEDURE — 99223 1ST HOSP IP/OBS HIGH 75: CPT

## 2023-03-22 PROCEDURE — 99285 EMERGENCY DEPT VISIT HI MDM: CPT | Mod: FS

## 2023-03-22 PROCEDURE — 71045 X-RAY EXAM CHEST 1 VIEW: CPT | Mod: 26

## 2023-03-22 RX ORDER — GLUCAGON INJECTION, SOLUTION 0.5 MG/.1ML
1 INJECTION, SOLUTION SUBCUTANEOUS ONCE
Refills: 0 | Status: DISCONTINUED | OUTPATIENT
Start: 2023-03-22 | End: 2023-03-30

## 2023-03-22 RX ORDER — DEXTROSE 50 % IN WATER 50 %
25 SYRINGE (ML) INTRAVENOUS ONCE
Refills: 0 | Status: DISCONTINUED | OUTPATIENT
Start: 2023-03-22 | End: 2023-03-30

## 2023-03-22 RX ORDER — CEFTRIAXONE 500 MG/1
1000 INJECTION, POWDER, FOR SOLUTION INTRAMUSCULAR; INTRAVENOUS EVERY 24 HOURS
Refills: 0 | Status: COMPLETED | OUTPATIENT
Start: 2023-03-23 | End: 2023-03-25

## 2023-03-22 RX ORDER — SODIUM CHLORIDE 9 MG/ML
1000 INJECTION, SOLUTION INTRAVENOUS
Refills: 0 | Status: DISCONTINUED | OUTPATIENT
Start: 2023-03-22 | End: 2023-03-30

## 2023-03-22 RX ORDER — FUROSEMIDE 40 MG
40 TABLET ORAL ONCE
Refills: 0 | Status: COMPLETED | OUTPATIENT
Start: 2023-03-22 | End: 2023-03-22

## 2023-03-22 RX ORDER — INSULIN LISPRO 100/ML
VIAL (ML) SUBCUTANEOUS
Refills: 0 | Status: DISCONTINUED | OUTPATIENT
Start: 2023-03-22 | End: 2023-03-30

## 2023-03-22 RX ORDER — GABAPENTIN 400 MG/1
200 CAPSULE ORAL AT BEDTIME
Refills: 0 | Status: DISCONTINUED | OUTPATIENT
Start: 2023-03-22 | End: 2023-03-30

## 2023-03-22 RX ORDER — ASPIRIN/CALCIUM CARB/MAGNESIUM 324 MG
81 TABLET ORAL DAILY
Refills: 0 | Status: DISCONTINUED | OUTPATIENT
Start: 2023-03-22 | End: 2023-03-30

## 2023-03-22 RX ORDER — CEFTRIAXONE 500 MG/1
INJECTION, POWDER, FOR SOLUTION INTRAMUSCULAR; INTRAVENOUS
Refills: 0 | Status: COMPLETED | OUTPATIENT
Start: 2023-03-22 | End: 2023-03-26

## 2023-03-22 RX ORDER — METOPROLOL TARTRATE 50 MG
100 TABLET ORAL EVERY 12 HOURS
Refills: 0 | Status: DISCONTINUED | OUTPATIENT
Start: 2023-03-22 | End: 2023-03-30

## 2023-03-22 RX ORDER — ATORVASTATIN CALCIUM 80 MG/1
40 TABLET, FILM COATED ORAL AT BEDTIME
Refills: 0 | Status: DISCONTINUED | OUTPATIENT
Start: 2023-03-22 | End: 2023-03-30

## 2023-03-22 RX ORDER — DEXTROSE 50 % IN WATER 50 %
15 SYRINGE (ML) INTRAVENOUS ONCE
Refills: 0 | Status: DISCONTINUED | OUTPATIENT
Start: 2023-03-22 | End: 2023-03-30

## 2023-03-22 RX ORDER — IPRATROPIUM/ALBUTEROL SULFATE 18-103MCG
3 AEROSOL WITH ADAPTER (GRAM) INHALATION EVERY 6 HOURS
Refills: 0 | Status: COMPLETED | OUTPATIENT
Start: 2023-03-22 | End: 2023-03-23

## 2023-03-22 RX ORDER — PANTOPRAZOLE SODIUM 20 MG/1
40 TABLET, DELAYED RELEASE ORAL EVERY 12 HOURS
Refills: 0 | Status: DISCONTINUED | OUTPATIENT
Start: 2023-03-22 | End: 2023-03-30

## 2023-03-22 RX ORDER — CEFTRIAXONE 500 MG/1
1000 INJECTION, POWDER, FOR SOLUTION INTRAMUSCULAR; INTRAVENOUS ONCE
Refills: 0 | Status: COMPLETED | OUTPATIENT
Start: 2023-03-22 | End: 2023-03-22

## 2023-03-22 RX ORDER — DEXTROSE 50 % IN WATER 50 %
12.5 SYRINGE (ML) INTRAVENOUS ONCE
Refills: 0 | Status: DISCONTINUED | OUTPATIENT
Start: 2023-03-22 | End: 2023-03-30

## 2023-03-22 RX ORDER — APIXABAN 2.5 MG/1
2.5 TABLET, FILM COATED ORAL EVERY 12 HOURS
Refills: 0 | Status: DISCONTINUED | OUTPATIENT
Start: 2023-03-22 | End: 2023-03-30

## 2023-03-22 RX ORDER — INSULIN LISPRO 100/ML
7 VIAL (ML) SUBCUTANEOUS
Refills: 0 | Status: DISCONTINUED | OUTPATIENT
Start: 2023-03-22 | End: 2023-03-30

## 2023-03-22 RX ORDER — INSULIN LISPRO 100/ML
VIAL (ML) SUBCUTANEOUS AT BEDTIME
Refills: 0 | Status: DISCONTINUED | OUTPATIENT
Start: 2023-03-22 | End: 2023-03-30

## 2023-03-22 RX ORDER — BUMETANIDE 0.25 MG/ML
1 INJECTION INTRAMUSCULAR; INTRAVENOUS
Refills: 0 | Status: DISCONTINUED | OUTPATIENT
Start: 2023-03-22 | End: 2023-03-25

## 2023-03-22 RX ORDER — BUMETANIDE 0.25 MG/ML
2 INJECTION INTRAMUSCULAR; INTRAVENOUS
Refills: 0 | Status: DISCONTINUED | OUTPATIENT
Start: 2023-03-22 | End: 2023-03-22

## 2023-03-22 RX ORDER — INSULIN GLARGINE 100 [IU]/ML
21 INJECTION, SOLUTION SUBCUTANEOUS AT BEDTIME
Refills: 0 | Status: DISCONTINUED | OUTPATIENT
Start: 2023-03-22 | End: 2023-03-30

## 2023-03-22 RX ADMIN — CEFTRIAXONE 1000 MILLIGRAM(S): 500 INJECTION, POWDER, FOR SOLUTION INTRAMUSCULAR; INTRAVENOUS at 20:30

## 2023-03-22 RX ADMIN — ATORVASTATIN CALCIUM 40 MILLIGRAM(S): 80 TABLET, FILM COATED ORAL at 23:40

## 2023-03-22 RX ADMIN — INSULIN GLARGINE 21 UNIT(S): 100 INJECTION, SOLUTION SUBCUTANEOUS at 23:39

## 2023-03-22 RX ADMIN — Medication 3 MILLILITER(S): at 23:39

## 2023-03-22 RX ADMIN — Medication 40 MILLIGRAM(S): at 16:40

## 2023-03-22 RX ADMIN — GABAPENTIN 200 MILLIGRAM(S): 400 CAPSULE ORAL at 23:40

## 2023-03-22 NOTE — ED ADULT NURSE REASSESSMENT NOTE - NS ED NURSE REASSESS COMMENT FT1
Unit for admission [facility] called but put on hold for longer then 15 minutes will try again. Statement Selected

## 2023-03-22 NOTE — H&P ADULT - PROBLEM SELECTOR PLAN 1
-SOB for one week requiring new O2, received additional diuretics at [facility] prior to admission, received 40 IV lasix in ED. Last TTE 1/30 showed severe pulm pressures. Pro-BNP on admission was 3205  -Bumex 1mg BID for now, reassess daily and titrate  -repeat echo ordered  -Cards aware, will see tomorrow   -currently on 3L O2, wean as tolerated   -Daily weights  -strict I/Os

## 2023-03-22 NOTE — H&P ADULT - PROBLEM SELECTOR PLAN 3
-Recent admission for acute rissa s/p ERCP w/ stent c/b gallbladder perforation s/p per rissa w/ fluid c/f candida and GPCs was on zosyn/fluconazole and dc'd on abx (seen by ID outpt after dc and told to monitor off abx). Perc rissa dislodged 2/1 with neg HIDA. Saw GI outpatient will need repeat ERCP, was supposed to have presurgical testing 3/23 for ERCP but now admitted  -consider advanced GI consult for ERCP once patient optimized from HF standpoint  -given persistent abdominal pain, c/w PPI BID, LFTs not indicating acute biliary process at this time, however if her pain worsens would have low threshold to repeat imaging  -At this time low suspicion for intrabdominal infection however noted leukocytosis on admission, which is consistent to her WBC on last admission. Will send off inflammatory markers, if elevated can consider ID consult (seen by Dr. Fonseca on last admission) and repeat CT A/P noncon

## 2023-03-22 NOTE — H&P ADULT - NSHPREVIEWOFSYSTEMS_GEN_ALL_CORE
CONSTITUTIONAL: No fever, weight loss  EYES: No eye pain, visual disturbances, or discharge  ENMT:  No difficulty hearing, tinnitus, vertigo; No sinus or throat pain  RESPIRATORY: + SOB. +cough, no wheezing, chills or hemoptysis  CARDIOVASCULAR: No chest pain, palpitations, dizziness, or leg swelling, + arm swelling   GASTROINTESTINAL: + abdominal or epigastric pain. + nausea, vomiting, no hematemesis; No diarrhea or constipation. No melena or hematochezia.  GENITOURINARY: No dysuria, frequency, hematuria, or incontinence  NEUROLOGICAL: No headaches, memory loss, loss of strength, numbness, or tremors  SKIN: No itching, burning, rashes, or lesions   LYMPH NODES: No enlarged glands  ENDOCRINE: No heat or cold intolerance; No hair loss  MUSCULOSKELETAL: No joint pain or swelling; No muscle, back pain  PSYCHIATRIC: No depression, anxiety, mood swings, or difficulty sleeping  HEME/LYMPH: No easy bruising, or bleeding gums

## 2023-03-22 NOTE — H&P ADULT - PROBLEM SELECTOR PLAN 6
-New on previous admission, was seen by EP at that time, poor candidate for ablation.   -c/w metoprolol 100 BID  -c/w home eliquis 2.5 BID   -monitor on telemetry   -if any further episodes of fib/fl would consult EP -c/w ASA, statin

## 2023-03-22 NOTE — ED PROVIDER NOTE - OBJECTIVE STATEMENT
83 old female history of renal insufficiency, DM 2, HTN, HLD, CHF, recent admission for cholecystectomy with planned ERCP B IBA from [facility] rehab for shortness of breath.  Patient Samoan speaking, collateral/translation with daughter at bedside who reports worsening SOB and peripheral edema and abdominal swelling.  Patient reports feeling short of breath and points to upper abdominal abdomen stating pain.  Denies chest pain, palpitations, LOC, fever, chills, vomiting 83 old female history of renal insufficiency, DM 2, HTN, HLD, CHF, recent admission for cholecystectomy with planned ERCP BIBA from [facility] for shortness of breath.  Patient Lao speaking, collateral/translation with daughter at bedside who reports worsening SOB and peripheral edema and abdominal swelling.  Patient reports feeling short of breath and points to upper abdominal abdomen stating pain.  Denies chest pain, palpitations, LOC, fever, chills, vomiting

## 2023-03-22 NOTE — H&P ADULT - PROBLEM SELECTOR PLAN 2
-ongoing for a week, HF workup as above  -r/o URI with full RVP pending  -standing nebs for 24 hours, can switch to PRN after if patient is clinically improving with diuresis  -CXR showing L sided opacity atelectasis vs pleural effusion  -VBG on admission showed worsening hypercapnia, if pt is SOB overnight can trial Bipap, repeat VBG in AM

## 2023-03-22 NOTE — ED ADULT NURSE NOTE - OBJECTIVE STATEMENT
pt is an 82yo female PMH CHF, Breast CA, lymphedema CHRISTINE CASTANO from [facility] for SOB. pt Liberian speaking, daughter at bedside to translate per pt request, daughter states pt has been experiencing worsening dyspnea, SOB for the past few days, daughter also noted worsening edema noted to the RUE, neck, and abdomen. pt endorsing pain to the RUQ and a burning sensation noted to the epigastrum. per EMS, pt found to be hypoxic to 92% on RA, placed on 3L oxygen via nc with improvement to 96%, pt placed on cardiac monitor, NS to the 80s, pt dressed in pt gown. pt A&Ox3 gross neuro intact, no difficulty speaking in complete sentences, s1s2 heart sounds heard, pulses x 4, prince x4, abdomen soft, RUQ tender, abdomen nondistended, skin intact. pt denies chest pain, ha, n/v/d, abdominal pain, f/c, urinary symptoms, hematuria

## 2023-03-22 NOTE — PATIENT PROFILE ADULT - FALL HARM RISK - HARM RISK INTERVENTIONS
Assistance with ambulation/Assistance OOB with selected safe patient handling equipment/Communicate Risk of Fall with Harm to all staff/Monitor gait and stability/Reinforce activity limits and safety measures with patient and family/Tailored Fall Risk Interventions/Visual Cue: Yellow wristband and red socks/Bed in lowest position, wheels locked, appropriate side rails in place/Call bell, personal items and telephone in reach/Instruct patient to call for assistance before getting out of bed or chair/Non-slip footwear when patient is out of bed/[place] to call system/Physically safe environment - no spills, clutter or unnecessary equipment/Purposeful Proactive Rounding/Room/bathroom lighting operational, light cord in reach

## 2023-03-22 NOTE — PATIENT PROFILE ADULT - FUNCTIONAL ASSESSMENT - BASIC MOBILITY 6.
3-calculated by average/Not able to assess (calculate score using [facility] averaging method)

## 2023-03-22 NOTE — ED ADULT NURSE REASSESSMENT NOTE - NS ED NURSE REASSESS COMMENT FT1
report received from Bethanie FLORES. Pt resting comfortably with right arm elevated, nasal cannula 3L sat 100%. Pt bed locked and lowered for pt safety, call bell within reach.

## 2023-03-22 NOTE — H&P ADULT - HISTORY OF PRESENT ILLNESS
83F w/ PMHx HTN, HLD, DM2, HFpEF (EF 65%), CAD s/p CABG, Breast CA s/p R partial mastectomy, rectal CA s/p resection with recent admission for acute rissa s/p ERCP w/ stent c/b gallbladder perf w/ perc rissa and recurrent SVT presenting for 1 week of shortness of breath and fluid retention. Patient has been at [facility] since her discharge from the hospital on 2/7/23. About a week ago she woke up in the middle of the night short of breath which continued to persist. She additionally complaints of epigastric burning sensation and early satiety. At [facility] she was placed on oxygen with minimal improvement in respiratory status, also received additional lasix 40 and metolazone today prior to coming to the ED. In the ED she received an additional 40mg IV lasix. Patient denies sick contacts. Since her last admission, she had followed up with ID who recommended monitoring pt off abx. She also saw GI the same day, recommending repeat ERCP end of month/beginning of April.

## 2023-03-22 NOTE — ED PROVIDER NOTE - NS ED ATTENDING STATEMENT MOD
Acute congestive heart failure, unspecified congestive heart failure type Acute congestive heart failure, unspecified congestive heart failure type Hypertensive urgency This was a shared visit with the ZANE. I reviewed and verified the documentation and independently performed the documented:

## 2023-03-22 NOTE — H&P ADULT - PROBLEM SELECTOR PLAN 5
-c/w ASA, statin -Check A1c, last A1c was 9.6 in January  -c/w home insulin regimen: Lantus 21U qhs, 7U premeal, LDISS

## 2023-03-22 NOTE — PATIENT PROFILE ADULT - MONEY FOR FOOD
SAFE MEDICATIONS IN PREGNANCY  Headache  • Extra Strength Tylenol 500 mg 1-2 tablets every 4-6 hours  • Excedrin Tension (Aspirin Free)  • Tylenol (Acetaminophen) 325 mg 2 tablets every 4-6 hours  • Do NOT take Ibuprofen, Motrin or Aleve unless instructed by your OB provider  • Do NOT take aspirin or products containing aspirin unless instructed by your OB provider    Migraine  • Extra Strength Tylenol 500 mg 1-2 tablets every 4-6 hours  • Excedrin Tension (Aspirin Free)  • Tylenol (Acetaminophen) 325 mg 2 tablets every 4-6 hours  • Contact your OB provider if no relief    Allergies  · Benadryl  · Claritin  · Mucinex  · Sudafed  · Zyrtec    Cold/Congestion/Cough/ Sore Throat  • Chloraseptic Spray  • Claritin  • Cough Drops  • Neti pot  • Robitussin DM  • Saline nasal spray  • Salt water gargle  • Sudafed               • Vicks Vaporub        Heartburn  • Eat 5-6 small meals a day and do not lie down right after eating  • Avoid foods that are acidic, spicy or fried  • Gaviscon  • Mylanta  • Pepcid- one tablet twice a day  • Prilosec- one tablet daily  • Tums              • Zantac 150 mg one tablet twice a day      Nausea  • Bonnine   • Chamomile tea  • Val  • Peppermint  • Vitamin B12 and Vitamin B6-  10-25 mg up to 3  times a day  Constipation  • Drink 6-8 glasses of water a day  • Prunes, fiber and fruits and vegetables  • Milk of Magnesia 1-2 tbsp. every night  • Colace 100 mg twice a day  • Ducolax suppository  • Miralax- follow package directions  • Senna  • Power Pudding: Equal parts of applesauce, prune juice, bran cereal.  May be seasoned with cinnamon and nutmeg to taste. Keep refrigerated in an air tight container. 2 tablespoons daily.     Diarrhea (if no fever or blood in stool)  • Imodium no more than 8 mg per day  • Lomotil  • Kaopectate    Insomnia  • Benadryl  • Tylenol PM  • Unisom    Yeast infection  • Gyne-Lotrimin  • Monistat   no

## 2023-03-22 NOTE — H&P ADULT - PROBLEM SELECTOR PLAN 4
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 Followed by Urology and Renal

## 2023-03-22 NOTE — ED ADULT NURSE NOTE - NSIMPLEMENTINTERV_GEN_ALL_ED
Implemented All Fall Risk Interventions:  [place] to call system. Call bell, personal items and telephone within reach. Instruct patient to call for assistance. Room bathroom lighting operational. Non-slip footwear when patient is off stretcher. Physically safe environment: no spills, clutter or unnecessary equipment. Stretcher in lowest position, wheels locked, appropriate side rails in place. Provide visual cue, wrist band, yellow gown, etc. Monitor gait and stability. Monitor for mental status changes and reorient to person, place, and time. Review medications for side effects contributing to fall risk. Reinforce activity limits and safety measures with patient and family.

## 2023-03-22 NOTE — H&P ADULT - ASSESSMENT
83F w/ PMHx HTN, HLD, DM2, HFpEF (EF 65%), CAD s/p CABG, Breast CA s/p R partial mastectomy, rectal CA s/p resection with recent admission for acute rissa s/p ERCP w/ stent c/b gallbladder perf w/ perc rissa and recurrent SVT presenting for 1 week of shortness of breath and fluid retention concerning for possible HF exacerbation.

## 2023-03-22 NOTE — ED PROVIDER NOTE - ATTENDING APP SHARED VISIT CONTRIBUTION OF CARE
82F history of HTN, HLD, DM2, HFpEF (EF 65%), CAD s/p CABG, Breast Ca s/p R partial mastectomy, Rectal Ca s/p resection presenting with abdominal pain x 1 week found to have acute cholecystitis, not a surgical candidate. S/p ERCP with stent placement. Patient presenting from [facility] with shortness of breath desatting into the low 90s and tachypneic on room air noted to have lower extremity edema and some rales at her bases placed on a nasal cannula CHF work-up.  Likely give a dose of Lasix.  No fevers or chills abdomen soft nontender with this history of tomorrow supposed to get preadmissions testing for CBD stent check.

## 2023-03-22 NOTE — H&P ADULT - NSHPPHYSICALEXAM_GEN_ALL_CORE
Vital Signs Last 24 Hrs  T(C): 36.7 (22 Mar 2023 16:06), Max: 36.7 (22 Mar 2023 15:34)  T(F): 98.1 (22 Mar 2023 16:06), Max: 98.1 (22 Mar 2023 16:06)  HR: 71 (22 Mar 2023 16:35) (71 - 72)  BP: 132/58 (22 Mar 2023 16:35) (110/66 - 132/58)  BP(mean): 76 (22 Mar 2023 16:35) (76 - 76)  RR: 21 (22 Mar 2023 16:35) (20 - 22)  SpO2: 97% (22 Mar 2023 16:35) (97% - 98%)    Parameters below as of 22 Mar 2023 16:35  Patient On (Oxygen Delivery Method): nasal cannula  O2 Flow (L/min): 3      CONSTITUTIONAL: Well-groomed, in no acute distress  EYES: No conjunctival or scleral injection, non-icteric; PERRLA and symmetric  ENMT: No external nasal lesions; nasal mucosa not inflamed; normal dentition; no pharyngeal injection or exudates, oral mucosa with moist membranes  NECK: Trachea midline without palpable neck mass; thyroid not enlarged and non-tender  RESPIRATORY: mild shortness of breath at rest; crackles auscultated at lung bases b/l in posterior lung fields  CARDIOVASCULAR: +S1S2, RRR, no M/G/R; no carotid bruits; pedal pulses full and symmetric; trace LE edema, R arm notably swollen   GASTROINTESTINAL: No palpable masses or tenderness, +BS throughout, no rebound/guarding; no hepatosplenomegaly; no hernia palpated, mild epigastric tenderness to palpation   MUSCULOSKELETAL: unable to assess gait, no digital clubbing or cyanosis  SKIN: No rashes or ulcers noted; no subcutaneous nodules or induration palpable  NEUROLOGIC: CN II-XII intact; sensation intact in LEs b/l to light touch  PSYCHIATRIC: A+O x 3; mood and affect appropriate; appropriate insight and judgment

## 2023-03-22 NOTE — H&P ADULT - PROBLEM SELECTOR PLAN 7
Diet: DASH, CC, fluid restriction  DVT: Eliquis  Dispo: pending workup, PT eval -New on previous admission, was seen by EP at that time, poor candidate for ablation.   -c/w metoprolol 100 BID  -c/w home eliquis 2.5 BID   -monitor on telemetry   -if any further episodes of fib/fl would consult EP

## 2023-03-22 NOTE — H&P ADULT - PROBLEM SELECTOR PLAN 8
Diet: DASH, CC, fluid restriction  DVT: Eliquis  Dispo: pending workup, PT eval Diet: DASH, CC, fluid restriction  DVT: Eliquis  Dispo: pending workup, PT eval  GOC: DNR/DNI, MOLST scanned in patient window, signed on 1/18/23, reaffirmed with patient and daughter that these wishes have not changed

## 2023-03-23 DIAGNOSIS — N18.30 CHRONIC KIDNEY DISEASE, STAGE 3 UNSPECIFIED: ICD-10-CM

## 2023-03-23 LAB
A1C WITH ESTIMATED AVERAGE GLUCOSE RESULT: 7.8 % — HIGH (ref 4–5.6)
ALBUMIN SERPL ELPH-MCNC: 3.1 G/DL — LOW (ref 3.3–5)
ALP SERPL-CCNC: 84 U/L — SIGNIFICANT CHANGE UP (ref 40–120)
ALT FLD-CCNC: 19 U/L — SIGNIFICANT CHANGE UP (ref 10–45)
ANION GAP SERPL CALC-SCNC: 11 MMOL/L — SIGNIFICANT CHANGE UP (ref 5–17)
ANION GAP SERPL CALC-SCNC: 9 MMOL/L — SIGNIFICANT CHANGE UP (ref 5–17)
APPEARANCE UR: ABNORMAL
AST SERPL-CCNC: 21 U/L — SIGNIFICANT CHANGE UP (ref 10–40)
BACTERIA # UR AUTO: NEGATIVE — SIGNIFICANT CHANGE UP
BASE EXCESS BLDV CALC-SCNC: 15.6 MMOL/L — HIGH (ref -2–3)
BASOPHILS # BLD AUTO: 0.03 K/UL — SIGNIFICANT CHANGE UP (ref 0–0.2)
BASOPHILS NFR BLD AUTO: 0.4 % — SIGNIFICANT CHANGE UP (ref 0–2)
BILIRUB SERPL-MCNC: 0.3 MG/DL — SIGNIFICANT CHANGE UP (ref 0.2–1.2)
BILIRUB UR-MCNC: NEGATIVE — SIGNIFICANT CHANGE UP
BLOOD GAS VENOUS - CREATININE: SIGNIFICANT CHANGE UP MG/DL (ref 0.5–1.3)
BUN SERPL-MCNC: 21 MG/DL — SIGNIFICANT CHANGE UP (ref 7–23)
BUN SERPL-MCNC: 21 MG/DL — SIGNIFICANT CHANGE UP (ref 7–23)
CA-I SERPL-SCNC: 1.17 MMOL/L — SIGNIFICANT CHANGE UP (ref 1.15–1.33)
CALCIUM SERPL-MCNC: 8.9 MG/DL — SIGNIFICANT CHANGE UP (ref 8.4–10.5)
CALCIUM SERPL-MCNC: 8.9 MG/DL — SIGNIFICANT CHANGE UP (ref 8.4–10.5)
CHLORIDE BLDV-SCNC: 99 MMOL/L — SIGNIFICANT CHANGE UP (ref 96–108)
CHLORIDE SERPL-SCNC: 96 MMOL/L — SIGNIFICANT CHANGE UP (ref 96–108)
CHLORIDE SERPL-SCNC: 97 MMOL/L — SIGNIFICANT CHANGE UP (ref 96–108)
CO2 BLDV-SCNC: 45 MMOL/L — HIGH (ref 22–26)
CO2 SERPL-SCNC: 36 MMOL/L — HIGH (ref 22–31)
CO2 SERPL-SCNC: 36 MMOL/L — HIGH (ref 22–31)
COLOR SPEC: ABNORMAL
CREAT SERPL-MCNC: 1.3 MG/DL — SIGNIFICANT CHANGE UP (ref 0.5–1.3)
CREAT SERPL-MCNC: 1.41 MG/DL — HIGH (ref 0.5–1.3)
CRP SERPL-MCNC: 32 MG/L — HIGH (ref 0–4)
DIFF PNL FLD: ABNORMAL
EGFR: 37 ML/MIN/1.73M2 — LOW
EGFR: 40 ML/MIN/1.73M2 — LOW
EOSINOPHIL # BLD AUTO: 0.01 K/UL — SIGNIFICANT CHANGE UP (ref 0–0.5)
EOSINOPHIL NFR BLD AUTO: 0.1 % — SIGNIFICANT CHANGE UP (ref 0–6)
EPI CELLS # UR: 0 /HPF — SIGNIFICANT CHANGE UP
ERYTHROCYTE [SEDIMENTATION RATE] IN BLOOD: 84 MM/HR — HIGH (ref 0–20)
ESTIMATED AVERAGE GLUCOSE: 177 MG/DL — HIGH (ref 68–114)
GAS PNL BLDV: 140 MMOL/L — SIGNIFICANT CHANGE UP (ref 136–145)
GAS PNL BLDV: SIGNIFICANT CHANGE UP
GAS PNL BLDV: SIGNIFICANT CHANGE UP
GLUCOSE BLDC GLUCOMTR-MCNC: 125 MG/DL — HIGH (ref 70–99)
GLUCOSE BLDC GLUCOMTR-MCNC: 162 MG/DL — HIGH (ref 70–99)
GLUCOSE BLDC GLUCOMTR-MCNC: 187 MG/DL — HIGH (ref 70–99)
GLUCOSE BLDC GLUCOMTR-MCNC: 210 MG/DL — HIGH (ref 70–99)
GLUCOSE BLDV-MCNC: 156 MG/DL — HIGH (ref 70–99)
GLUCOSE SERPL-MCNC: 124 MG/DL — HIGH (ref 70–99)
GLUCOSE SERPL-MCNC: 156 MG/DL — HIGH (ref 70–99)
GLUCOSE UR QL: NEGATIVE — SIGNIFICANT CHANGE UP
HCO3 BLDV-SCNC: 43 MMOL/L — HIGH (ref 22–29)
HCT VFR BLD CALC: 31.4 % — LOW (ref 34.5–45)
HCT VFR BLDA CALC: 29 % — LOW (ref 34.5–46.5)
HGB BLD CALC-MCNC: 9.6 G/DL — LOW (ref 11.7–16.1)
HGB BLD-MCNC: 9.3 G/DL — LOW (ref 11.5–15.5)
HYALINE CASTS # UR AUTO: 1 /LPF — SIGNIFICANT CHANGE UP (ref 0–2)
IMM GRANULOCYTES NFR BLD AUTO: 0.4 % — SIGNIFICANT CHANGE UP (ref 0–0.9)
KETONES UR-MCNC: NEGATIVE — SIGNIFICANT CHANGE UP
LACTATE BLDV-MCNC: 1.4 MMOL/L — SIGNIFICANT CHANGE UP (ref 0.5–2)
LEUKOCYTE ESTERASE UR-ACNC: ABNORMAL
LYMPHOCYTES # BLD AUTO: 1.33 K/UL — SIGNIFICANT CHANGE UP (ref 1–3.3)
LYMPHOCYTES # BLD AUTO: 18.2 % — SIGNIFICANT CHANGE UP (ref 13–44)
MAGNESIUM SERPL-MCNC: 1.7 MG/DL — SIGNIFICANT CHANGE UP (ref 1.6–2.6)
MAGNESIUM SERPL-MCNC: 1.8 MG/DL — SIGNIFICANT CHANGE UP (ref 1.6–2.6)
MCHC RBC-ENTMCNC: 29.2 PG — SIGNIFICANT CHANGE UP (ref 27–34)
MCHC RBC-ENTMCNC: 29.6 GM/DL — LOW (ref 32–36)
MCV RBC AUTO: 98.7 FL — SIGNIFICANT CHANGE UP (ref 80–100)
MONOCYTES # BLD AUTO: 0.49 K/UL — SIGNIFICANT CHANGE UP (ref 0–0.9)
MONOCYTES NFR BLD AUTO: 6.7 % — SIGNIFICANT CHANGE UP (ref 2–14)
NEUTROPHILS # BLD AUTO: 5.4 K/UL — SIGNIFICANT CHANGE UP (ref 1.8–7.4)
NEUTROPHILS NFR BLD AUTO: 74.2 % — SIGNIFICANT CHANGE UP (ref 43–77)
NITRITE UR-MCNC: NEGATIVE — SIGNIFICANT CHANGE UP
NRBC # BLD: 0 /100 WBCS — SIGNIFICANT CHANGE UP (ref 0–0)
PCO2 BLDV: 73 MMHG — HIGH (ref 39–42)
PH BLDV: 7.38 — SIGNIFICANT CHANGE UP (ref 7.32–7.43)
PH UR: 7 — SIGNIFICANT CHANGE UP (ref 5–8)
PHOSPHATE SERPL-MCNC: 2.5 MG/DL — SIGNIFICANT CHANGE UP (ref 2.5–4.5)
PHOSPHATE SERPL-MCNC: 3.5 MG/DL — SIGNIFICANT CHANGE UP (ref 2.5–4.5)
PLATELET # BLD AUTO: 349 K/UL — SIGNIFICANT CHANGE UP (ref 150–400)
PO2 BLDV: 101 MMHG — HIGH (ref 25–45)
POTASSIUM BLDV-SCNC: 3.4 MMOL/L — LOW (ref 3.5–5.1)
POTASSIUM SERPL-MCNC: 3.5 MMOL/L — SIGNIFICANT CHANGE UP (ref 3.5–5.3)
POTASSIUM SERPL-MCNC: 3.6 MMOL/L — SIGNIFICANT CHANGE UP (ref 3.5–5.3)
POTASSIUM SERPL-SCNC: 3.5 MMOL/L — SIGNIFICANT CHANGE UP (ref 3.5–5.3)
POTASSIUM SERPL-SCNC: 3.6 MMOL/L — SIGNIFICANT CHANGE UP (ref 3.5–5.3)
PROCALCITONIN SERPL-MCNC: 0.14 NG/ML — HIGH (ref 0.02–0.1)
PROT SERPL-MCNC: 6.6 G/DL — SIGNIFICANT CHANGE UP (ref 6–8.3)
PROT UR-MCNC: ABNORMAL
RBC # BLD: 3.18 M/UL — LOW (ref 3.8–5.2)
RBC # FLD: 15.6 % — HIGH (ref 10.3–14.5)
RBC CASTS # UR COMP ASSIST: 15 /HPF — HIGH (ref 0–4)
SAO2 % BLDV: 98.9 % — HIGH (ref 67–88)
SODIUM SERPL-SCNC: 141 MMOL/L — SIGNIFICANT CHANGE UP (ref 135–145)
SODIUM SERPL-SCNC: 144 MMOL/L — SIGNIFICANT CHANGE UP (ref 135–145)
SP GR SPEC: 1.01 — SIGNIFICANT CHANGE UP (ref 1.01–1.02)
UROBILINOGEN FLD QL: NEGATIVE — SIGNIFICANT CHANGE UP
WBC # BLD: 7.29 K/UL — SIGNIFICANT CHANGE UP (ref 3.8–10.5)
WBC # FLD AUTO: 7.29 K/UL — SIGNIFICANT CHANGE UP (ref 3.8–10.5)
WBC UR QL: 663 /HPF — HIGH (ref 0–5)

## 2023-03-23 PROCEDURE — 99223 1ST HOSP IP/OBS HIGH 75: CPT

## 2023-03-23 PROCEDURE — 99233 SBSQ HOSP IP/OBS HIGH 50: CPT

## 2023-03-23 PROCEDURE — 93306 TTE W/DOPPLER COMPLETE: CPT | Mod: 26

## 2023-03-23 PROCEDURE — 76770 US EXAM ABDO BACK WALL COMP: CPT | Mod: 26

## 2023-03-23 RX ADMIN — Medication 7 UNIT(S): at 12:57

## 2023-03-23 RX ADMIN — Medication 7 UNIT(S): at 17:51

## 2023-03-23 RX ADMIN — Medication 3 MILLILITER(S): at 05:52

## 2023-03-23 RX ADMIN — Medication 81 MILLIGRAM(S): at 11:44

## 2023-03-23 RX ADMIN — PANTOPRAZOLE SODIUM 40 MILLIGRAM(S): 20 TABLET, DELAYED RELEASE ORAL at 05:53

## 2023-03-23 RX ADMIN — INSULIN GLARGINE 21 UNIT(S): 100 INJECTION, SOLUTION SUBCUTANEOUS at 21:23

## 2023-03-23 RX ADMIN — APIXABAN 2.5 MILLIGRAM(S): 2.5 TABLET, FILM COATED ORAL at 17:52

## 2023-03-23 RX ADMIN — GABAPENTIN 200 MILLIGRAM(S): 400 CAPSULE ORAL at 21:21

## 2023-03-23 RX ADMIN — Medication 1: at 09:08

## 2023-03-23 RX ADMIN — BUMETANIDE 1 MILLIGRAM(S): 0.25 INJECTION INTRAMUSCULAR; INTRAVENOUS at 05:52

## 2023-03-23 RX ADMIN — Medication 1: at 12:57

## 2023-03-23 RX ADMIN — Medication 7 UNIT(S): at 09:08

## 2023-03-23 RX ADMIN — APIXABAN 2.5 MILLIGRAM(S): 2.5 TABLET, FILM COATED ORAL at 05:53

## 2023-03-23 RX ADMIN — Medication 3 MILLILITER(S): at 17:57

## 2023-03-23 RX ADMIN — Medication 100 MILLIGRAM(S): at 17:56

## 2023-03-23 RX ADMIN — ATORVASTATIN CALCIUM 40 MILLIGRAM(S): 80 TABLET, FILM COATED ORAL at 21:21

## 2023-03-23 RX ADMIN — Medication 3 MILLILITER(S): at 11:46

## 2023-03-23 RX ADMIN — CEFTRIAXONE 100 MILLIGRAM(S): 500 INJECTION, POWDER, FOR SOLUTION INTRAMUSCULAR; INTRAVENOUS at 19:08

## 2023-03-23 RX ADMIN — Medication 100 MILLIGRAM(S): at 05:53

## 2023-03-23 RX ADMIN — BUMETANIDE 1 MILLIGRAM(S): 0.25 INJECTION INTRAMUSCULAR; INTRAVENOUS at 13:05

## 2023-03-23 RX ADMIN — PANTOPRAZOLE SODIUM 40 MILLIGRAM(S): 20 TABLET, DELAYED RELEASE ORAL at 17:52

## 2023-03-23 NOTE — PROGRESS NOTE ADULT - PROBLEM SELECTOR PLAN 3
- resolved, eliquis resumed without issue  - new gross hematuria 2/1 - 2/2  - straight cath UA confirms hematuria  - urine appears brown  - CT on 1/31 without evidence of kidney stones per discussion with radiologist  - appreciate urology recs positive UA, f/u urine cx  -cont CTX for 3 days

## 2023-03-23 NOTE — CONSULT NOTE ADULT - ASSESSMENT
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 BNP elevated to > 3k. Bilateral pleural effusions seen on TTE.    - Supplemental O2 as needed goal O2 sat >= 90%  - Diuresis goal net negative 1-2 L daily  - Can get CT chest to evaluate for parenchymal abnormalities, however suspect current symptoms are due to pulmonary edema  - Strict I/Os  - VQ scan to evaluate for possible CTEPH  - Would repeat TTE after adequate diuresis, if pulmonary hypertension is still present then can consider RHC for further evaluation

## 2023-03-23 NOTE — PROGRESS NOTE ADULT - PROBLEM SELECTOR PLAN 1
- presented with 1 week abdominal pain, nausea, vomiting  - initial CT A/P c/w cholecystitis  - per surgery, not a candidate given comorbidities  - s/p ERCP 1/20 w/ stent  - repeat CT A/P for recurrent abdominal pain c/f perforated gallbladder  - perc rissa 1/25 - dislodged 2/1  - abdominal fluid cx w/ GPCs and candida  - repeat CT A/P for recurrent abdominal pain with dislodged cholecystostomy tube and increased fluid collection  - HIDA 2/3 negative, patent cystic duct  - right abdomen TTP c/f bile peritonitis, non-operable per surgery    Plan:  - c/w zosyn, fluconazole per ID  - plan for repeat ERCP w/ stent removal in 2 - 3 months  - follow fever/WBC curve, if c/f sepsis IR to replace dislodged rissa tube  - abdominal pain improving TTE as above with EF 75%, b/l pleural effusions, BNP elevated but decreased compared to prior  -cont diuresis with Bumex 1mg BID for now  -Strict Is/Os, daily weights, 1L fluid restriction  -cardiology consulted

## 2023-03-23 NOTE — PROGRESS NOTE ADULT - PROBLEM SELECTOR PLAN 4
- Hx of HFpEF  - recent admission 8/2022 for ADHF   - Echo w/ EF 60%, increased E/e' on 1/30/23  - c/w bumex 1 qD  - strict I&O, weights daily  - appreciate cardiology recs Recent admission for acute rissa s/p ERCP w/ stent c/b gallbladder perforation s/p perc rissa with fluid cultures positive for candida and GPCs s/p IV antibiotics. Perc rissa dislodged 2/1, per IR stable. Pt was planned for outpatient ERCP next thurs 3/29  -consider advanced GI consult for ERCP once patient optimized from HF standpoint  -low suspicion for intra-abdominal infection at this moment; no indication for antibiotics for now

## 2023-03-23 NOTE — PROGRESS NOTE ADULT - PROBLEM SELECTOR PLAN 6
- presented in mild DKA  - A1C 9.7  - c/w basal-bolus, target glucose 110 - 180 last A1c was 9.6 in January  -c/w home insulin regimen: Lantus 21U qhs, 7U premeal, LDISS.

## 2023-03-23 NOTE — CONSULT NOTE ADULT - SUBJECTIVE AND OBJECTIVE BOX
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 Patient currently reports she is still having some difficulty breathing. No chest pain. + swelling. + orthopnea. No fevers, chills, nausea, emesis, or diarrhea.     REVIEW OF SYSTEMS:  See above. ROS otherwise negative    PAST MEDICAL & SURGICAL HISTORY:  Breast CA, right   s/p mastectomy ,IV Chemotherapy      HTN (hypertension)      Lymphedema of arm  right arm s/p mastectomy      Hyperlipidemia      Rectal cancer  s/p chemotherapy and  radiation 12 weeks 2015 -3/2015      Obese      Type II diabetes mellitus      CHF (congestive heart failure)      DVT of leg (deep venous thrombosis)  right calf DVT  2019 pt on Eliquis      Renal insufficiency      S/P mastectomy, right        History of appendectomy        S/P ileostomy  low anterior resection-8/10/15, reversal of ileostomy       S/P colon resection        S/P TKR (total knee replacement), right            FAMILY HISTORY:  Family history of diabetes mellitus in mother    Family history of diabetes mellitus in son    Family history of heart attack (Child)        SOCIAL HISTORY:  No tobacco or drug use    Allergies    CT scan dye (Hives)  penicillin (Unknown)    Intolerances        HOME MEDICATIONS:  Home Medications:  acetaminophen 325 mg oral tablet: 2 tab(s) orally every 6 hours, As needed, Mild Pain (1 - 3), Moderate Pain (4 - 6) (22 Mar 2023 18:40)  aluminum hydroxide-magnesium hydroxide 200 mg-200 mg/5 mL oral suspension: 30 milliliter(s) orally every 6 hours, As needed, Dyspepsia (22 Mar 2023 18:40)  apixaban 2.5 mg oral tablet: 1 tab(s) orally 2 times a day (22 Mar 2023 18:40)  aspirin 81 mg oral tablet, chewable: 1 tab(s) orally once a day (22 Mar 2023 18:40)  atorvastatin 40 mg oral tablet: 1 tab(s) orally once a day (at bedtime) (22 Mar 2023 18:40)  bumetanide 1 mg oral tablet: 1 tab(s) orally once a day (22 Mar 2023 18:40)  DuoNeb 0.5 mg-2.5 mg/3 mL inhalation solution: 3 milliliter(s) inhaled 4 times a day, As Needed (22 Mar 2023 18:40)  gabapentin 100 mg oral capsule: 2 cap(s) orally once a day (22 Mar 2023 18:40)  insulin lispro 100 units/mL injectable solution: 7  injectable 3 times a day (before meals) (22 Mar 2023 18:40)  Lantus 100 units/mL subcutaneous solution: 21 unit(s) subcutaneous once a day (at bedtime) (22 Mar 2023 18:40)  metoprolol tartrate 100 mg oral tablet: 1 tab(s) orally every 12 hours (22 Mar 2023 18:40)  pantoprazole 40 mg oral delayed release tablet: 1 tab(s) orally 2 times a day (22 Mar 2023 18:40)  sodium bicarbonate 650 mg oral tablet: 1 tab(s) orally once a day (22 Mar 2023 18:40)          OBJECTIVE:  ICU Vital Signs Last 24 Hrs  T(C): 36.8 (23 Mar 2023 11:00), Max: 37.1 (22 Mar 2023 19:55)  T(F): 98.2 (23 Mar 2023 11:00), Max: 98.7 (22 Mar 2023 19:55)  HR: 77 (23 Mar 2023 14:45) (65 - 85)  BP: 128/71 (23 Mar 2023 14:45) (119/72 - 144/78)  BP(mean): 76 (22 Mar 2023 16:35) (76 - 76)  ABP: --  ABP(mean): --  RR: 18 (23 Mar 2023 11:00) (18 - 22)  SpO2: 93% (23 Mar 2023 14:45) (93% - 99%)    O2 Parameters below as of 23 Mar 2023 14:45  Patient On (Oxygen Delivery Method): nasal cannula  O2 Flow (L/min): 2             @ 07:  -   @ 07:00  --------------------------------------------------------  IN: 0 mL / OUT: 550 mL / NET: -550 mL     @ 07:01  -  03-23 @ 15:50  --------------------------------------------------------  IN: 420 mL / OUT: 300 mL / NET: 120 mL      CAPILLARY BLOOD GLUCOSE      POCT Blood Glucose.: 187 mg/dL (23 Mar 2023 12:31)      PHYSICAL EXAM:  General: Adult female lying comfortably in bed, NAD  HEENT: NC/AT sclerae anicteric  Neck: Supple  Respiratory: No increased WOB, poor inspiratory effort. Diminished breath sounds at bases b/l. No w/c/r  Cardiovascular: S1, S2  Abdomen: Soft, + BS  Extremities: WWP, + edema  Neurological: Awake, alert, follows commands  Psychiatry: Appropriate affect    HOSPITAL MEDICATIONS:  Standing Meds:  albuterol/ipratropium for Nebulization 3 milliLiter(s) Nebulizer every 6 hours  apixaban 2.5 milliGRAM(s) Oral every 12 hours  aspirin  chewable 81 milliGRAM(s) Oral daily  atorvastatin 40 milliGRAM(s) Oral at bedtime  buMETAnide Injectable 1 milliGRAM(s) IV Push two times a day  cefTRIAXone   IVPB      cefTRIAXone   IVPB 1000 milliGRAM(s) IV Intermittent every 24 hours  dextrose 5%. 1000 milliLiter(s) IV Continuous <Continuous>  dextrose 5%. 1000 milliLiter(s) IV Continuous <Continuous>  dextrose 50% Injectable 25 Gram(s) IV Push once  dextrose 50% Injectable 12.5 Gram(s) IV Push once  dextrose 50% Injectable 25 Gram(s) IV Push once  gabapentin 200 milliGRAM(s) Oral at bedtime  glucagon  Injectable 1 milliGRAM(s) IntraMuscular once  insulin glargine Injectable (LANTUS) 21 Unit(s) SubCutaneous at bedtime  insulin lispro (ADMELOG) corrective regimen sliding scale   SubCutaneous three times a day before meals  insulin lispro (ADMELOG) corrective regimen sliding scale   SubCutaneous at bedtime  insulin lispro Injectable (ADMELOG) 7 Unit(s) SubCutaneous three times a day before meals  metoprolol tartrate 100 milliGRAM(s) Oral every 12 hours  pantoprazole    Tablet 40 milliGRAM(s) Oral every 12 hours      PRN Meds:  dextrose Oral Gel 15 Gram(s) Oral once PRN      LABS:                        9.3    7.29  )-----------( 349      ( 23 Mar 2023 07:17 )             31.4     Hgb Trend: 9.3<--, 9.9<--      144  |  97  |  21  ----------------------------<  156<H>  3.5   |  36<H>  |  1.41<H>    Ca    8.9      23 Mar 2023 07:10  Phos  3.5       Mg     1.8         TPro  6.6  /  Alb  3.1<L>  /  TBili  0.3  /  DBili  x   /  AST  21  /  ALT  19  /  AlkPhos  84      Creatinine Trend: 1.41<--, 1.41<--  PT/INR - ( 22 Mar 2023 16:13 )   PT: 15.7 sec;   INR: 1.36 ratio         PTT - ( 22 Mar 2023 16:13 )  PTT:29.5 sec  Urinalysis Basic - ( 23 Mar 2023 13:47 )    Color: Light Orange / Appearance: Slightly Turbid / S.010 / pH: x  Gluc: x / Ketone: Negative  / Bili: Negative / Urobili: Negative   Blood: x / Protein: 30 mg/dL / Nitrite: Negative   Leuk Esterase: Large / RBC: 15 /hpf /  /HPF   Sq Epi: x / Non Sq Epi: 0 /hpf / Bacteria: Negative        Venous Blood Gas:   @ 07:08  7.38/73/101/43/98.9  VBG Lactate: 1.4  Venous Blood Gas:   @ 15:56  7.42/62/58/40/89.9  VBG Lactate: 1.1      MICROBIOLOGY:       RADIOLOGY:  [x ] Reviewed and interpreted by me    PULMONARY FUNCTION TESTS:    EKG:

## 2023-03-23 NOTE — PROGRESS NOTE ADULT - SUBJECTIVE AND OBJECTIVE BOX
[facility] Division of Hospital Medicine  Kati Khan MD  Pager (M-F, 2Q-2O): [phone]  Other Times:  [phone]    Patient is a 83y old  Female who presents with a chief complaint of shortness of breath (22 Mar 2023 18:47)      SUBJECTIVE / OVERNIGHT EVENTS:  ADDITIONAL REVIEW OF SYSTEMS:    MEDICATIONS  (STANDING):  albuterol/ipratropium for Nebulization 3 milliLiter(s) Nebulizer every 6 hours  apixaban 2.5 milliGRAM(s) Oral every 12 hours  aspirin  chewable 81 milliGRAM(s) Oral daily  atorvastatin 40 milliGRAM(s) Oral at bedtime  buMETAnide Injectable 1 milliGRAM(s) IV Push two times a day  cefTRIAXone   IVPB      cefTRIAXone   IVPB 1000 milliGRAM(s) IV Intermittent every 24 hours  dextrose 5%. 1000 milliLiter(s) (100 mL/Hr) IV Continuous <Continuous>  dextrose 5%. 1000 milliLiter(s) (50 mL/Hr) IV Continuous <Continuous>  dextrose 50% Injectable 25 Gram(s) IV Push once  dextrose 50% Injectable 12.5 Gram(s) IV Push once  dextrose 50% Injectable 25 Gram(s) IV Push once  gabapentin 200 milliGRAM(s) Oral at bedtime  glucagon  Injectable 1 milliGRAM(s) IntraMuscular once  insulin glargine Injectable (LANTUS) 21 Unit(s) SubCutaneous at bedtime  insulin lispro (ADMELOG) corrective regimen sliding scale   SubCutaneous three times a day before meals  insulin lispro (ADMELOG) corrective regimen sliding scale   SubCutaneous at bedtime  insulin lispro Injectable (ADMELOG) 7 Unit(s) SubCutaneous three times a day before meals  metoprolol tartrate 100 milliGRAM(s) Oral every 12 hours  pantoprazole    Tablet 40 milliGRAM(s) Oral every 12 hours    MEDICATIONS  (PRN):  dextrose Oral Gel 15 Gram(s) Oral once PRN Blood Glucose LESS THAN 70 milliGRAM(s)/deciliter      CAPILLARY BLOOD GLUCOSE      POCT Blood Glucose.: 187 mg/dL (23 Mar 2023 12:31)  POCT Blood Glucose.: 162 mg/dL (23 Mar 2023 08:30)  POCT Blood Glucose.: 234 mg/dL (22 Mar 2023 23:36)  POCT Blood Glucose.: 148 mg/dL (22 Mar 2023 21:33)  POCT Blood Glucose.: 117 mg/dL (22 Mar 2023 15:50)    I&O's Summary    22 Mar 2023 07:01  -  23 Mar 2023 07:00  --------------------------------------------------------  IN: 0 mL / OUT: 550 mL / NET: -550 mL    23 Mar 2023 07:01  -  23 Mar 2023 14:27  --------------------------------------------------------  IN: 420 mL / OUT: 300 mL / NET: 120 mL        PHYSICAL EXAM:  Vital Signs Last 24 Hrs  T(C): 36.8 (23 Mar 2023 11:00), Max: 37.1 (22 Mar 2023 19:55)  T(F): 98.2 (23 Mar 2023 11:00), Max: 98.7 (22 Mar 2023 19:55)  HR: 69 (23 Mar 2023 11:00) (65 - 85)  BP: 119/72 (23 Mar 2023 11:00) (110/66 - 144/78)  BP(mean): 76 (22 Mar 2023 16:35) (76 - 76)  RR: 18 (23 Mar 2023 11:00) (18 - 22)  SpO2: 97% (23 Mar 2023 11:00) (95% - 99%)    Parameters below as of 23 Mar 2023 11:00  Patient On (Oxygen Delivery Method): nasal cannula  O2 Flow (L/min): 2    CONSTITUTIONAL: NAD, well-developed, well-groomed  EYES: PERRLA; conjunctiva and sclera clear  ENMT: Moist oral mucosa, no pharyngeal injection or exudates  NECK: Supple, no palpable masses; no thyromegaly  RESPIRATORY: Normal respiratory effort; lungs are clear to auscultation bilaterally  CARDIOVASCULAR: Regular rate and rhythm, normal S1 and S2, no murmur/rub/gallop; No lower extremity edema  ABDOMEN: Nontender to palpation, normoactive bowel sounds, no rebound/guarding  MUSCULOSKELETAL:  No clubbing or cyanosis of digits; no joint swelling or tenderness to palpation  PSYCH: A+O to person, place, and time; affect appropriate  NEUROLOGY: CN 2-12 are intact and symmetric; no gross sensory deficits   SKIN: No rashes; no palpable lesions    LABS: reviewed                        9.3    7.29  )-----------( 349      ( 23 Mar 2023 07:17 )             31.4         144  |  97  |  21  ----------------------------<  156<H>  3.5   |  36<H>  |  1.41<H>    Ca    8.9      23 Mar 2023 07:10  Phos  3.5       Mg     1.8         TPro  6.6  /  Alb  3.1<L>  /  TBili  0.3  /  DBili  x   /  AST  21  /  ALT  19  /  AlkPhos  84      PT/INR - ( 22 Mar 2023 16:13 )   PT: 15.7 sec;   INR: 1.36 ratio         PTT - ( 22 Mar 2023 16:13 )  PTT:29.5 sec      Urinalysis Basic - ( 23 Mar 2023 13:47 )    Color: Light Orange / Appearance: Slightly Turbid / S.010 / pH: x  Gluc: x / Ketone: Negative  / Bili: Negative / Urobili: Negative   Blood: x / Protein: 30 mg/dL / Nitrite: Negative   Leuk Esterase: Large / RBC: 15 /hpf /  /HPF   Sq Epi: x / Non Sq Epi: 0 /hpf / Bacteria: Negative     [facility] Division of Hospital Medicine  Kati Khan MD  Pager (M-F, 8N-8E): [phone]  Other Times:  [phone]    Patient is a 83y old  Female who presents with a chief complaint of shortness of breath (22 Mar 2023 18:47)      SUBJECTIVE / OVERNIGHT EVENTS: No acute events. Pt continues to endorse dyspnea despite diuresis, still requiring supplemental O2.   Translation services were offered to the patient but patient declined preferring her daughter, Shauna at bedside, to serve as ad hoc     ADDITIONAL REVIEW OF SYSTEMS: Negative    MEDICATIONS  (STANDING):  albuterol/ipratropium for Nebulization 3 milliLiter(s) Nebulizer every 6 hours  apixaban 2.5 milliGRAM(s) Oral every 12 hours  aspirin  chewable 81 milliGRAM(s) Oral daily  atorvastatin 40 milliGRAM(s) Oral at bedtime  buMETAnide Injectable 1 milliGRAM(s) IV Push two times a day  cefTRIAXone   IVPB      cefTRIAXone   IVPB 1000 milliGRAM(s) IV Intermittent every 24 hours  dextrose 5%. 1000 milliLiter(s) (100 mL/Hr) IV Continuous <Continuous>  dextrose 5%. 1000 milliLiter(s) (50 mL/Hr) IV Continuous <Continuous>  dextrose 50% Injectable 25 Gram(s) IV Push once  dextrose 50% Injectable 12.5 Gram(s) IV Push once  dextrose 50% Injectable 25 Gram(s) IV Push once  gabapentin 200 milliGRAM(s) Oral at bedtime  glucagon  Injectable 1 milliGRAM(s) IntraMuscular once  insulin glargine Injectable (LANTUS) 21 Unit(s) SubCutaneous at bedtime  insulin lispro (ADMELOG) corrective regimen sliding scale   SubCutaneous three times a day before meals  insulin lispro (ADMELOG) corrective regimen sliding scale   SubCutaneous at bedtime  insulin lispro Injectable (ADMELOG) 7 Unit(s) SubCutaneous three times a day before meals  metoprolol tartrate 100 milliGRAM(s) Oral every 12 hours  pantoprazole    Tablet 40 milliGRAM(s) Oral every 12 hours    MEDICATIONS  (PRN):  dextrose Oral Gel 15 Gram(s) Oral once PRN Blood Glucose LESS THAN 70 milliGRAM(s)/deciliter      CAPILLARY BLOOD GLUCOSE      POCT Blood Glucose.: 187 mg/dL (23 Mar 2023 12:31)  POCT Blood Glucose.: 162 mg/dL (23 Mar 2023 08:30)  POCT Blood Glucose.: 234 mg/dL (22 Mar 2023 23:36)  POCT Blood Glucose.: 148 mg/dL (22 Mar 2023 21:33)  POCT Blood Glucose.: 117 mg/dL (22 Mar 2023 15:50)    I&O's Summary    22 Mar 2023 07:01  -  23 Mar 2023 07:00  --------------------------------------------------------  IN: 0 mL / OUT: 550 mL / NET: -550 mL    23 Mar 2023 07:01  -  23 Mar 2023 14:27  --------------------------------------------------------  IN: 420 mL / OUT: 300 mL / NET: 120 mL        PHYSICAL EXAM:  Vital Signs Last 24 Hrs  T(C): 36.8 (23 Mar 2023 11:00), Max: 37.1 (22 Mar 2023 19:55)  T(F): 98.2 (23 Mar 2023 11:00), Max: 98.7 (22 Mar 2023 19:55)  HR: 69 (23 Mar 2023 11:00) (65 - 85)  BP: 119/72 (23 Mar 2023 11:00) (110/66 - 144/78)  BP(mean): 76 (22 Mar 2023 16:35) (76 - 76)  RR: 18 (23 Mar 2023 11:00) (18 - 22)  SpO2: 97% (23 Mar 2023 11:00) (95% - 99%)    Parameters below as of 23 Mar 2023 11:00  Patient On (Oxygen Delivery Method): nasal cannula  O2 Flow (L/min): 2    Daily Weight in k.6 (23 Mar 2023 09:18)    CONSTITUTIONAL: NAD, well-appearing elderly woman  EYES: PERRLA; conjunctiva and sclera clear  ENMT: Moist oral mucosa, no pharyngeal injection or exudates  NECK: Supple, no palpable masses; no thyromegaly  RESPIRATORY: Normal respiratory effort; mild wheezing on exam  CARDIOVASCULAR: Regular rate and rhythm, normal S1 and S2, no murmur/rub/gallop; improved lower extremity edema  ABDOMEN: Nontender to palpation, normoactive bowel sounds, no rebound/guarding  MUSCULOSKELETAL: RUE chronic lymphedema  PSYCH: A+O to person, place, and time; affect appropriate  NEUROLOGY: CN 2-12 are intact and symmetric; no gross sensory deficits   SKIN: chronic venous stasis changes    LABS: reviewed                        9.3    7.29  )-----------( 349      ( 23 Mar 2023 07:17 )             31.4         144  |  97  |  21  ----------------------------<  156<H>  3.5   |  36<H>  |  1.41<H>    Ca    8.9      23 Mar 2023 07:10  Phos  3.5       Mg     1.8         TPro  6.6  /  Alb  3.1<L>  /  TBili  0.3  /  DBili  x   /  AST  21  /  ALT  19  /  AlkPhos  84      PT/INR - ( 22 Mar 2023 16:13 )   PT: 15.7 sec;   INR: 1.36 ratio         PTT - ( 22 Mar 2023 16:13 )  PTT:29.5 sec      Urinalysis Basic - ( 23 Mar 2023 13:47 )    Color: Light Orange / Appearance: Slightly Turbid / S.010 / pH: x  Gluc: x / Ketone: Negative  / Bili: Negative / Urobili: Negative   Blood: x / Protein: 30 mg/dL / Nitrite: Negative   Leuk Esterase: Large / RBC: 15 /hpf /  /HPF   Sq Epi: x / Non Sq Epi: 0 /hpf / Bacteria: Negative    < from: TTE with Doppler (w/Cont) (23 @ 09:48) >  EF ([place] Rule): 76 %    Conclusions:  1. Mild mitral annular calcification. Tethered mitral valve  leaflets with normal opening. Minimal mitral regurgitation.  2. Calcified trileaflet aortic valve with normal opening.  No aortic valve regurgitation seen.  3. Endocardial visualization enhanced with intravenous  injection of Ultrasonic Enhancing Agent (Definity). Overall  hyperdynamic left ventricular systolic function. The basal  inferoseptum is hypokinetic.  4. The right ventricle is not well visualized; grossly,  right ventricle appears mildly enlarged with mildly  decreased right ventricular systolic function.  5. Estimated right ventricular systolic pressure equals 62  mm Hg, assuming right atrial pressure equals 3 mm Hg,  consistent with severe pulmonary hypertension.  6. Bilateral pleural effusions.    < end of copied text >

## 2023-03-23 NOTE — PROGRESS NOTE ADULT - PROBLEM SELECTOR PLAN 2
- baseline 1.5 - 1.7  - single functioning kidney (right w/ known severe hydronephrosis)  - non-oliguric TAO multifactorial largely i/s/o relative hypotension  - likely ATN now    Plan:  - Cr now downtrending  - monitor on diuresis as below  - dose adjust medications for Cr Likely 2/2 HF exac above; however pt also noted with hypercarbic and hypoxic respiratory failure and severe pulmonary hypertension on ECHO, with decreased RVSF  -CXR with Left basilar pleural effusion and/or atelectasis.  -RVP negative  -Cont diuresis as above  -Wean off O2  -CT chest ordered  -Low suspicion for PE as pt is on eliquis  -Pulm consulted

## 2023-03-23 NOTE — CONSULT NOTE ADULT - SUBJECTIVE AND OBJECTIVE BOX
DATE OF SERVICE: 03-23-23 @ 14:44    CHIEF COMPLAINT:Patient is a 83y old  Female who presents with a chief complaint of shortness of breath (23 Mar 2023 14:24)      HISTORY OF PRESENT ILLNESS:  83F w/ PMHx HTN, HLD, DM2, HFpEF (EF 65%), CAD s/p CABG, Breast CA s/p R partial mastectomy, rectal CA s/p resection with recent admission for acute rissa s/p ERCP w/ stent c/b gallbladder perf w/ perc rissa and recurrent SVT presenting for 1 week of shortness of breath and fluid retention. Patient has been at [facility] since her discharge from the hospital on 2/7/23. About a week ago she woke up in the middle of the night short of breath which continued to persist. She additionally complaints of epigastric burning sensation and early satiety. At [facility] she was placed on oxygen with minimal improvement in respiratory status, also received additional lasix 40 and metolazone today prior to coming to the ED. In the ED she received an additional 40mg IV lasix. Patient denies sick contacts. Since her last admission, she had followed up with ID who recommended monitoring pt off abx. She also saw GI the same day, recommending repeat ERCP end of month/beginning of April.    (22 Mar 2023 18:47)      PAST MEDICAL & SURGICAL HISTORY:  Breast CA, right  1989 s/p mastectomy ,IV Chemotherapy      HTN (hypertension)      Lymphedema of arm  right arm s/p mastectomy      Hyperlipidemia      Rectal cancer  s/p chemotherapy and  radiation 12 weeks 2/2015 -3/2015      Obese      Type II diabetes mellitus      CHF (congestive heart failure)      DVT of leg (deep venous thrombosis)  right calf DVT  2/2019 pt on Eliquis      Renal insufficiency      S/P mastectomy, right  1989      History of appendectomy  1953      S/P ileostomy  low anterior resection-8/10/15, reversal of ileostomy 2016      S/P colon resection  2015      S/P TKR (total knee replacement), right  2017              MEDICATIONS:  apixaban 2.5 milliGRAM(s) Oral every 12 hours  aspirin  chewable 81 milliGRAM(s) Oral daily  buMETAnide Injectable 1 milliGRAM(s) IV Push two times a day  metoprolol tartrate 100 milliGRAM(s) Oral every 12 hours    cefTRIAXone   IVPB      cefTRIAXone   IVPB 1000 milliGRAM(s) IV Intermittent every 24 hours    albuterol/ipratropium for Nebulization 3 milliLiter(s) Nebulizer every 6 hours    gabapentin 200 milliGRAM(s) Oral at bedtime    pantoprazole    Tablet 40 milliGRAM(s) Oral every 12 hours    atorvastatin 40 milliGRAM(s) Oral at bedtime  dextrose 50% Injectable 25 Gram(s) IV Push once  dextrose 50% Injectable 12.5 Gram(s) IV Push once  dextrose 50% Injectable 25 Gram(s) IV Push once  dextrose Oral Gel 15 Gram(s) Oral once PRN  glucagon  Injectable 1 milliGRAM(s) IntraMuscular once  insulin glargine Injectable (LANTUS) 21 Unit(s) SubCutaneous at bedtime  insulin lispro (ADMELOG) corrective regimen sliding scale   SubCutaneous three times a day before meals  insulin lispro (ADMELOG) corrective regimen sliding scale   SubCutaneous at bedtime  insulin lispro Injectable (ADMELOG) 7 Unit(s) SubCutaneous three times a day before meals    dextrose 5%. 1000 milliLiter(s) IV Continuous <Continuous>  dextrose 5%. 1000 milliLiter(s) IV Continuous <Continuous>      FAMILY HISTORY:  Family history of diabetes mellitus in mother    Family history of diabetes mellitus in son    Family history of heart attack (Child)        Non-contributory    SOCIAL HISTORY:    Not an active smoker    Allergies    CT scan dye (Hives)  penicillin (Unknown)    Intolerances    	    REVIEW OF SYSTEMS:  CONSTITUTIONAL: No fever  EYES: No eye pain, visual disturbances, or discharge  ENMT:  No difficulty hearing, tinnitus  NECK: No pain or stiffness  RESPIRATORY: No cough, wheezing,  CARDIOVASCULAR: No chest pain, palpitations, passing out, dizziness, or leg swelling  GASTROINTESTINAL:  No nausea, vomiting, diarrhea or constipation. No melena. + epigastric burning  GENITOURINARY: No dysuria, hematuria  NEUROLOGICAL: No stroke like symptoms  SKIN: No burning or lesions   ENDOCRINE: No heat or cold intolerance  MUSCULOSKELETAL: No joint pain or swelling  PSYCHIATRIC: No  anxiety, mood swings  HEME/LYMPH: No bleeding gums  ALLERGY AND IMMUNOLOGIC: No hives or eczema	    All other ROS negative    PHYSICAL EXAM:  T(C): 36.8 (03-23-23 @ 11:00), Max: 37.1 (03-22-23 @ 19:55)  HR: 69 (03-23-23 @ 11:00) (65 - 85)  BP: 119/72 (03-23-23 @ 11:00) (110/66 - 144/78)  RR: 18 (03-23-23 @ 11:00) (18 - 22)  SpO2: 97% (03-23-23 @ 11:00) (95% - 99%)  Wt(kg): --  I&O's Summary    22 Mar 2023 07:01  -  23 Mar 2023 07:00  --------------------------------------------------------  IN: 0 mL / OUT: 550 mL / NET: -550 mL    23 Mar 2023 07:01  -  23 Mar 2023 14:44  --------------------------------------------------------  IN: 420 mL / OUT: 300 mL / NET: 120 mL        Appearance: Normal	  HEENT:   Normal oral mucosa, EOMI	  Cardiovascular:  S1 S2, No JVD,    Respiratory: B/L basilar crackles  Psychiatry: Alert  Gastrointestinal:  Soft, Non-tender, + BS	  Skin: No rashes   Neurologic: Non-focal  Extremities:  minimal dependent edema  Vascular: Peripheral pulses palpable    	    	  	  CARDIAC MARKERS:  Labs personally reviewed by me                                  9.3    7.29  )-----------( 349      ( 23 Mar 2023 07:17 )             31.4     03-23    144  |  97  |  21  ----------------------------<  156<H>  3.5   |  36<H>  |  1.41<H>    Ca    8.9      23 Mar 2023 07:10  Phos  3.5     03-23  Mg     1.8     03-23    TPro  6.6  /  Alb  3.1<L>  /  TBili  0.3  /  DBili  x   /  AST  21  /  ALT  19  /  AlkPhos  84  03-23          EKG: Personally reviewed by me - NSR  Radiology: Personally reviewed by me -     < from: Xray Chest 1 View- PORTABLE-Urgent (Xray Chest 1 View- PORTABLE-Urgent .) (03.22.23 @ 16:29) >  FINDINGS:  No pneumothorax.  Mild pulmonary vascular congestion.  Left basilar pleural effusion and/or atelectasis.  Sternotomy. The heart size is not well evaluated on this projection.   Right chest wall surgical clips.  The visualized osseous and soft tissue structures demonstrate no acute   pathology.    IMPRESSION:  Left basilar pleural effusion and/or atelectasis.    < end of copied text >  < from: TTE with Doppler (w/Cont) (03.23.23 @ 09:48) >  F (Maldonado Rule): 76 %Doppler Peak Velocity (m/sec):  AoV=1.7  ------------------------------------------------------------------------  Observations:  Mitral Valve: Mild mitral annular calcification. Tethered  mitral valve leaflets with normal opening. Minimal mitral  regurgitation.  Aortic Valve/Aorta: Calcified trileaflet aortic valve with  normal opening. Peak transaortic valve gradient equals 12  mm Hg. No aortic valve regurgitation seen. Peak left  ventricular outflow tract gradient equals 2 mm Hg, mean  gradient is equal to 1 mm Hg, LVOT velocity time integral  equals 19 cm.  Aortic Root: 3 cm.  Ascending Aorta: 3.6 cm.  LVOT diameter: 2.1 cm.  Left Atrium: Moderately dilated left atrium.  LA volume  index = 45 cc/m2. Mobile interatrial septum.  Left Ventricle: Endocardial visualization enhanced with  intravenous injection of Ultrasonic Enhancing Agent  (Definity). Overall hyperdynamic left ventricular systolic  function. The basal inferoseptum is hypokinetic. Normal  left ventricular internal dimensions and wall thicknesses.  Moderate diastolic dysfunction (Stage II).  Right Heart: Normal right atrium. The right ventricle is  not well visualized; grossly, right ventricle appears  mildly enlarged with mildly decreased right ventricular  systolic function. Tricuspid valve not well visualized,  probably normal. Mild tricuspid regurgitation. Pulmonic  valve not well visualized. No pulmonic regurgitation.  Pericardium/Pleura: No pericardial effusion.  Bilateral pleural effusions.  Hemodynamic: Estimated right atrial pressure equals 3 mmHg.  Estimated right ventricular systolic pressure equals 62 mm  Hg, assuming right atrial pressure equals 3 mm Hg,  consistent with severe pulmonary hypertension.  ------------------------------------------------------------------------  Conclusions:  1. Mild mitral annular calcification. Tethered mitral valve  leaflets with normal opening. Minimal mitral regurgitation.  2. Calcified trileaflet aortic valve with normal opening.  No aortic valve regurgitation seen.  3. Endocardial visualization enhanced with intravenous  injection of Ultrasonic Enhancing Agent (Definity). Overall  hyperdynamic left ventricular systolic function. The basal  inferoseptum is hypokinetic.  4. The right ventricle is not well visualized; grossly,  right ventricle appears mildly enlarged with mildly  decreased right ventricular systolic function.  5. Estimated right ventricular systolic pressure equals 62  mm Hg, assuming right atrial pressure equals 3 mm Hg,  consistent with severe pulmonary hypertension.  6. Bilateral pleural effusions.  *** Compared with echocardiogram of 1/30/2023, results are  similar on today's study.    < end of copied text >  < from: Cardiac Cath Lab - Adult (11.05.20 @ 15:13) >  CORONARY VESSELS: The coronary circulation is right dominant.  LM:   --  LM: Angiography showed moderate atherosclerosis.  LAD:   --  Proximal LAD: There was a diffuse 60 % stenosis.  CX:   --  Ostial circumflex: There was a 90 % stenosis.  RCA:   --  Ostial RCA: There was a 90 % stenosis.  COMPLICATIONS: There were no complications.  DIAGNOSTIC RECOMMENDATIONS: Consultation with a cardiac surgeon will be  obtained for coronary artery bypass grafting.    < end of copied text >  ·  PROCEDURES:  Bypass graft, coronary artery, 1 arterial and 2 venous 09-Nov-2020 19:34:33 CABG X 2 (LIMA->LAD, SVG->RCA) Nadja Lacey.    Assessment /Plan:     83F w/ PMHx HTN, HLD, DM2, HFpEF (EF 65%), CAD s/p CABG, Breast CA s/p R partial mastectomy, rectal CA s/p resection with recent admission for acute rissa s/p ERCP w/ stent c/b gallbladder perf w/ perc rissa and recurrent SVT presenting for 1 week of shortness of breath and fluid retention. Patient has been at [facility] since her discharge from the hospital on 2/7/23. About a week ago she woke up in the middle of the night short of breath which continued to persist. She additionally complaints of epigastric burning sensation and early satiety. At [facility] she was placed on oxygen with minimal improvement in respiratory status, also received additional lasix 40 and metolazone today prior to coming to the ED. Denies CP, palpitations or syncope.    Problem/Plan -1  Problem: Acute on Chronic Diastolic CHF  - Patient with severe pulm pressures on echo  - CXR with pulm edema  - proBNP 3205  - Repeat TTE shows preserved EF, hyperdynamic LV function, basal inferoseptum hypokinesis, decreased RV systolic functin, severe pulm pressures, B/L pleural effusions  - c/w Bumex 1mg IV BID    Problem/Plan -2  Problem: CAD   - s/p CABG 2020  - ECG with no ischemia noted  - Trop 21  - c/w ASA and statin  - TTE shows basal inferoseptum hypokinesis  - c/o chronic epigastric burningmost likely noncardiac in nature given patient's recent cholecystitis     Problem/Plan -3  Problem: hx of Recurrent SVT  - c/w Metoprolol 100mg PO BID  - cont to monitor on tele    Problem/Plan -4  Problem: Atrial Fibrillation  - Patient with n/o A-flutter on previous admission  - c/w Metoprolol   - c/w eliquis 2.5mg PO BID    Differential diagnosis and plan of care discussed with patient after the evaluation. Counseling on diet, nutritional counseling, weight management, exercise and medication compliance was done.   Advanced care planning/advanced directives discussed with patient/family. DNR status including forceful chest compressions to attempt to restart the heart, ventilator support/artificial breathing, electric shock, artificial nutrition, health care proxy, Molst form all discussed with pt. Pt wishes to consider. More than fifteen minutes spent on discussing advanced directives.     Delaney JUAREZ-BC  Sergey Winchester DO [facility]  Cardiovascular Medicine  [address], [address]  Available for call or text via [employer]  Office [phone]   DATE OF SERVICE: 03-23-23 @ 14:44    CHIEF COMPLAINT:Patient is a 83y old  Female who presents with a chief complaint of shortness of breath (23 Mar 2023 14:24)      HISTORY OF PRESENT ILLNESS:  83F w/ PMHx HTN, HLD, DM2, HFpEF (EF 65%), CAD s/p CABG, Breast CA s/p R partial mastectomy, rectal CA s/p resection with recent admission for acute rissa s/p ERCP w/ stent c/b gallbladder perf w/ perc rissa and recurrent SVT presenting for 1 week of shortness of breath and fluid retention. Patient has been at [facility] since her discharge from the hospital on 2/7/23. About a week ago she woke up in the middle of the night short of breath which continued to persist. She additionally complaints of epigastric burning sensation and early satiety. At [facility] she was placed on oxygen with minimal improvement in respiratory status, also received additional lasix 40 and metolazone today prior to coming to the ED. In the ED she received an additional 40mg IV lasix. Patient denies sick contacts. Since her last admission, she had followed up with ID who recommended monitoring pt off abx. She also saw GI the same day, recommending repeat ERCP end of month/beginning of April.    (22 Mar 2023 18:47)      PAST MEDICAL & SURGICAL HISTORY:  Breast CA, right  1989 s/p mastectomy ,IV Chemotherapy      HTN (hypertension)      Lymphedema of arm  right arm s/p mastectomy      Hyperlipidemia      Rectal cancer  s/p chemotherapy and  radiation 12 weeks 2/2015 -3/2015      Obese      Type II diabetes mellitus      CHF (congestive heart failure)      DVT of leg (deep venous thrombosis)  right calf DVT  2/2019 pt on Eliquis      Renal insufficiency      S/P mastectomy, right  1989      History of appendectomy  1953      S/P ileostomy  low anterior resection-8/10/15, reversal of ileostomy 2016      S/P colon resection  2015      S/P TKR (total knee replacement), right  2017              MEDICATIONS:  apixaban 2.5 milliGRAM(s) Oral every 12 hours  aspirin  chewable 81 milliGRAM(s) Oral daily  buMETAnide Injectable 1 milliGRAM(s) IV Push two times a day  metoprolol tartrate 100 milliGRAM(s) Oral every 12 hours    cefTRIAXone   IVPB      cefTRIAXone   IVPB 1000 milliGRAM(s) IV Intermittent every 24 hours    albuterol/ipratropium for Nebulization 3 milliLiter(s) Nebulizer every 6 hours    gabapentin 200 milliGRAM(s) Oral at bedtime    pantoprazole    Tablet 40 milliGRAM(s) Oral every 12 hours    atorvastatin 40 milliGRAM(s) Oral at bedtime  dextrose 50% Injectable 25 Gram(s) IV Push once  dextrose 50% Injectable 12.5 Gram(s) IV Push once  dextrose 50% Injectable 25 Gram(s) IV Push once  dextrose Oral Gel 15 Gram(s) Oral once PRN  glucagon  Injectable 1 milliGRAM(s) IntraMuscular once  insulin glargine Injectable (LANTUS) 21 Unit(s) SubCutaneous at bedtime  insulin lispro (ADMELOG) corrective regimen sliding scale   SubCutaneous three times a day before meals  insulin lispro (ADMELOG) corrective regimen sliding scale   SubCutaneous at bedtime  insulin lispro Injectable (ADMELOG) 7 Unit(s) SubCutaneous three times a day before meals    dextrose 5%. 1000 milliLiter(s) IV Continuous <Continuous>  dextrose 5%. 1000 milliLiter(s) IV Continuous <Continuous>      FAMILY HISTORY:  Family history of diabetes mellitus in mother    Family history of diabetes mellitus in son    Family history of heart attack (Child)        Non-contributory    SOCIAL HISTORY:    Not an active smoker    Allergies    CT scan dye (Hives)  penicillin (Unknown)    Intolerances    	    REVIEW OF SYSTEMS:  CONSTITUTIONAL: No fever  EYES: No eye pain, visual disturbances, or discharge  ENMT:  No difficulty hearing, tinnitus  NECK: No pain or stiffness  RESPIRATORY: No cough, wheezing,  CARDIOVASCULAR: No chest pain, palpitations, passing out, dizziness, or leg swelling  GASTROINTESTINAL:  No nausea, vomiting, diarrhea or constipation. No melena. + epigastric burning  GENITOURINARY: No dysuria, hematuria  NEUROLOGICAL: No stroke like symptoms  SKIN: No burning or lesions   ENDOCRINE: No heat or cold intolerance  MUSCULOSKELETAL: No joint pain or swelling  PSYCHIATRIC: No  anxiety, mood swings  HEME/LYMPH: No bleeding gums  ALLERGY AND IMMUNOLOGIC: No hives or eczema	    All other ROS negative    PHYSICAL EXAM:  T(C): 36.8 (03-23-23 @ 11:00), Max: 37.1 (03-22-23 @ 19:55)  HR: 69 (03-23-23 @ 11:00) (65 - 85)  BP: 119/72 (03-23-23 @ 11:00) (110/66 - 144/78)  RR: 18 (03-23-23 @ 11:00) (18 - 22)  SpO2: 97% (03-23-23 @ 11:00) (95% - 99%)  Wt(kg): --  I&O's Summary    22 Mar 2023 07:01  -  23 Mar 2023 07:00  --------------------------------------------------------  IN: 0 mL / OUT: 550 mL / NET: -550 mL    23 Mar 2023 07:01  -  23 Mar 2023 14:44  --------------------------------------------------------  IN: 420 mL / OUT: 300 mL / NET: 120 mL        Appearance: Normal	  HEENT:   Normal oral mucosa, EOMI	  Cardiovascular:  S1 S2, No JVD,    Respiratory: B/L basilar crackles  Psychiatry: Alert  Gastrointestinal:  Soft, Non-tender, + BS	  Skin: No rashes   Neurologic: Non-focal  Extremities:  minimal dependent edema  Vascular: Peripheral pulses palpable    	    	  	  CARDIAC MARKERS:  Labs personally reviewed by me                                  9.3    7.29  )-----------( 349      ( 23 Mar 2023 07:17 )             31.4     03-23    144  |  97  |  21  ----------------------------<  156<H>  3.5   |  36<H>  |  1.41<H>    Ca    8.9      23 Mar 2023 07:10  Phos  3.5     03-23  Mg     1.8     03-23    TPro  6.6  /  Alb  3.1<L>  /  TBili  0.3  /  DBili  x   /  AST  21  /  ALT  19  /  AlkPhos  84  03-23          EKG: Personally reviewed by me - NSR  Radiology: Personally reviewed by me -     < from: Xray Chest 1 View- PORTABLE-Urgent (Xray Chest 1 View- PORTABLE-Urgent .) (03.22.23 @ 16:29) >  FINDINGS:  No pneumothorax.  Mild pulmonary vascular congestion.  Left basilar pleural effusion and/or atelectasis.  Sternotomy. The heart size is not well evaluated on this projection.   Right chest wall surgical clips.  The visualized osseous and soft tissue structures demonstrate no acute   pathology.    IMPRESSION:  Left basilar pleural effusion and/or atelectasis.    < end of copied text >  < from: TTE with Doppler (w/Cont) (03.23.23 @ 09:48) >  F ([place]onado Rule): 76 %Doppler Peak Velocity (m/sec):  AoV=1.7  ------------------------------------------------------------------------  Observations:  Mitral Valve: Mild mitral annular calcification. Tethered  mitral valve leaflets with normal opening. Minimal mitral  regurgitation.  Aortic Valve/Aorta: Calcified trileaflet aortic valve with  normal opening. Peak transaortic valve gradient equals 12  mm Hg. No aortic valve regurgitation seen. Peak left  ventricular outflow tract gradient equals 2 mm Hg, mean  gradient is equal to 1 mm Hg, LVOT velocity time integral  equals 19 cm.  Aortic Root: 3 cm.  Ascending Aorta: 3.6 cm.  LVOT diameter: 2.1 cm.  Left Atrium: Moderately dilated left atrium.  LA volume  index = 45 cc/m2. Mobile interatrial septum.  Left Ventricle: Endocardial visualization enhanced with  intravenous injection of Ultrasonic Enhancing Agent  (Definity). Overall hyperdynamic left ventricular systolic  function. The basal inferoseptum is hypokinetic. Normal  left ventricular internal dimensions and wall thicknesses.  Moderate diastolic dysfunction (Stage II).  Right Heart: Normal right atrium. The right ventricle is  not well visualized; grossly, right ventricle appears  mildly enlarged with mildly decreased right ventricular  systolic function. Tricuspid valve not well visualized,  probably normal. Mild tricuspid regurgitation. Pulmonic  valve not well visualized. No pulmonic regurgitation.  Pericardium/Pleura: No pericardial effusion.  Bilateral pleural effusions.  Hemodynamic: Estimated right atrial pressure equals 3 mmHg.  Estimated right ventricular systolic pressure equals 62 mm  Hg, assuming right atrial pressure equals 3 mm Hg,  consistent with severe pulmonary hypertension.  ------------------------------------------------------------------------  Conclusions:  1. Mild mitral annular calcification. Tethered mitral valve  leaflets with normal opening. Minimal mitral regurgitation.  2. Calcified trileaflet aortic valve with normal opening.  No aortic valve regurgitation seen.  3. Endocardial visualization enhanced with intravenous  injection of Ultrasonic Enhancing Agent (Definity). Overall  hyperdynamic left ventricular systolic function. The basal  inferoseptum is hypokinetic.  4. The right ventricle is not well visualized; grossly,  right ventricle appears mildly enlarged with mildly  decreased right ventricular systolic function.  5. Estimated right ventricular systolic pressure equals 62  mm Hg, assuming right atrial pressure equals 3 mm Hg,  consistent with severe pulmonary hypertension.  6. Bilateral pleural effusions.  *** Compared with echocardiogram of 1/30/2023, results are  similar on today's study.    < end of copied text >  < from: Cardiac Cath Lab - Adult (11.05.20 @ 15:13) >  CORONARY VESSELS: The coronary circulation is right dominant.  LM:   --  LM: Angiography showed moderate atherosclerosis.  LAD:   --  Proximal LAD: There was a diffuse 60 % stenosis.  CX:   --  Ostial circumflex: There was a 90 % stenosis.  RCA:   --  Ostial RCA: There was a 90 % stenosis.  COMPLICATIONS: There were no complications.  DIAGNOSTIC RECOMMENDATIONS: Consultation with a cardiac surgeon will be  obtained for coronary artery bypass grafting.    < end of copied text >  ·  PROCEDURES:  Bypass graft, coronary artery, 1 arterial and 2 venous 09-Nov-2020 19:34:33 CABG X 2 (LIMA->LAD, SVG->RCA) Nadja Lacey.    Assessment /Plan:     83F w/ PMHx HTN, HLD, DM2, HFpEF (EF 65%), CAD s/p CABG, Breast CA s/p R partial mastectomy, rectal CA s/p resection with recent admission for acute rissa s/p ERCP w/ stent c/b gallbladder perf w/ perc rissa and recurrent SVT presenting for 1 week of shortness of breath and fluid retention. Patient has been at [facility] since her discharge from the hospital on 2/7/23. About a week ago she woke up in the middle of the night short of breath which continued to persist. She additionally complaints of epigastric burning sensation and early satiety. At [facility] she was placed on oxygen with minimal improvement in respiratory status, also received additional lasix 40 and metolazone today prior to coming to the ED. Denies CP, palpitations or syncope.    Problem/Plan -1  Problem: Acute on Chronic Diastolic CHF  - Patient with severe pulm pressures on echo  - CXR with pulm edema  - proBNP 3205  - Repeat TTE shows preserved EF, hyperdynamic LV function, basal inferoseptum hypokinesis, decreased RV systolic functin, severe pulm pressures, B/L pleural effusions  - c/w Bumex 1mg IV BID, repeat Cr in AM    Problem/Plan -2  Problem: CAD   - s/p CABG 2020  - ECG with no ischemia noted  - Trop 21  - c/w ASA and statin  - TTE shows basal inferoseptum hypokinesis  - c/o chronic epigastric burning most likely noncardiac in nature given patient's recent cholecystitis     Problem/Plan -3  Problem: hx of Recurrent SVT  - c/w Metoprolol 100mg PO BID  - cont to monitor on tele    Problem/Plan -4  Problem: Atrial Fibrillation  - Patient with n/o A-flutter on previous admission  - c/w Metoprolol   - c/w eliquis 2.5mg PO BID        Differential diagnosis and plan of care discussed with patient after the evaluation. Counseling on diet, nutritional counseling, weight management, exercise and medication compliance was done.   Advanced care planning/advanced directives discussed with patient/family. DNR status including forceful chest compressions to attempt to restart the heart, ventilator support/artificial breathing, electric shock, artificial nutrition, health care proxy, Molst form all discussed with pt. Pt wishes to consider. More than fifteen minutes spent on discussing advanced directives.     Delaney JUAREZ-YAIR W[place] DO [facility]  Cardiovascular Medicine  [address] [place] [address] Dr, [address]  Available for call or text via [employer] TEAM[employer]  Office [phone]

## 2023-03-23 NOTE — PROGRESS NOTE ADULT - PROBLEM SELECTOR PLAN 5
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  Creatinine currently at baseline  Avoid nephrotoxins

## 2023-03-23 NOTE — CONSULT NOTE ADULT - SUBJECTIVE AND OBJECTIVE BOX
Patient is a 83y old  Female who presents with a chief complaint of shortness of breath (23 Mar 2023 14:43)      HPI:  83F w/ PMHx HTN, HLD, DM2, HFpEF (EF 65%), CAD s/p CABG, Breast CA s/p R partial mastectomy, rectal CA s/p resection with recent admission for acute rissa s/p ERCP w/ stent c/b gallbladder perf w/ perc rissa and recurrent SVT presenting for 1 week of shortness of breath and fluid retention. Patient has been at [facility] since her discharge from the hospital on 23. About a week ago she woke up in the middle of the night short of breath which continued to persist. She additionally complaints of epigastric burning sensation and early satiety. At [facility] she was placed on oxygen with minimal improvement in respiratory status, also received additional lasix 40 and metolazone today prior to coming to the ED. In the ED she received an additional 40mg IV lasix. Patient denies sick contacts. Since her last admission, she had followed up with ID who recommended monitoring pt off abx. She also saw GI the same day, recommending repeat ERCP end of month/beginning of April.    (22 Mar 2023 18:47)      PAST MEDICAL & SURGICAL HISTORY:  Breast CA, right   s/p mastectomy ,IV Chemotherapy      HTN (hypertension)      Lymphedema of arm  right arm s/p mastectomy      Hyperlipidemia      Rectal cancer  s/p chemotherapy and  radiation 12 weeks 2015 -3/2015      Obese      Type II diabetes mellitus      CHF (congestive heart failure)      DVT of leg (deep venous thrombosis)  right calf DVT  2019 pt on Eliquis      Renal insufficiency      S/P mastectomy, right        History of appendectomy        S/P ileostomy  low anterior resection-8/10/15, reversal of ileostomy       S/P colon resection  2015      S/P TKR (total knee replacement), right  2017          MEDICATIONS  (STANDING):  albuterol/ipratropium for Nebulization 3 milliLiter(s) Nebulizer every 6 hours  apixaban 2.5 milliGRAM(s) Oral every 12 hours  aspirin  chewable 81 milliGRAM(s) Oral daily  atorvastatin 40 milliGRAM(s) Oral at bedtime  buMETAnide Injectable 1 milliGRAM(s) IV Push two times a day  cefTRIAXone   IVPB      cefTRIAXone   IVPB 1000 milliGRAM(s) IV Intermittent every 24 hours  dextrose 5%. 1000 milliLiter(s) (100 mL/Hr) IV Continuous <Continuous>  dextrose 5%. 1000 milliLiter(s) (50 mL/Hr) IV Continuous <Continuous>  dextrose 50% Injectable 25 Gram(s) IV Push once  dextrose 50% Injectable 12.5 Gram(s) IV Push once  dextrose 50% Injectable 25 Gram(s) IV Push once  gabapentin 200 milliGRAM(s) Oral at bedtime  glucagon  Injectable 1 milliGRAM(s) IntraMuscular once  insulin glargine Injectable (LANTUS) 21 Unit(s) SubCutaneous at bedtime  insulin lispro (ADMELOG) corrective regimen sliding scale   SubCutaneous three times a day before meals  insulin lispro (ADMELOG) corrective regimen sliding scale   SubCutaneous at bedtime  insulin lispro Injectable (ADMELOG) 7 Unit(s) SubCutaneous three times a day before meals  metoprolol tartrate 100 milliGRAM(s) Oral every 12 hours  pantoprazole    Tablet 40 milliGRAM(s) Oral every 12 hours      Allergies    CT scan dye (Hives)  penicillin (Unknown)    Intolerances        SOCIAL HISTORY:  Denies ETOh,Smoking,     FAMILY HISTORY:  Family history of diabetes mellitus in mother    Family history of diabetes mellitus in son    Family history of heart attack (Child)        REVIEW OF SYSTEMS:  CONSTITUTIONAL: No weakness, fevers or chills  EYES/ENT: No visual changes;  No vertigo or throat pain   NECK: No pain or stiffness  RESPIRATORY: No cough, wheezing, hemoptysis; No shortness of breath  CARDIOVASCULAR: No chest pain or palpitations  GASTROINTESTINAL: No abdominal or epigastric pain. No nausea, vomiting, or hematemesis; No diarrhea or constipation. No melena or hematochezia.  GENITOURINARY: No dysuria, frequency or hematuria  NEUROLOGICAL: No numbness or weakness  SKIN: No itching, burning, rashes, or lesions   All other review of systems is negative unless indicated above.    VITAL:  T(C): , Max: 37.1 (23 @ 19:55)  T(F): , Max: 98.7 (23 @ 19:55)  HR: 77 (23 @ 14:45)  BP: 128/71 (23 @ 14:45)  BP(mean): 76 (23 @ 16:35)  RR: 18 (23 @ 11:00)  SpO2: 93% (23 @ 14:45)  Wt(kg): --    PHYSICAL EXAM:  Constitutional: NAD, Alert  HEENT: NCAT, MMM  Neck: Supple, No JVD  Respiratory: CTA-b/l  Cardiovascular: RRR s1s2, no m/r/g  Gastrointestinal: BS+, soft, NT/ND  Extremities: No peripheral edema b/l  Neurological: no focal deficits; strength grossly intact  Back: no CVAT b/l  Skin: No rashes, no nevi    LABS:                        9.3    7.29  )-----------( 349      ( 23 Mar 2023 07:17 )             31.4     Na(144)/K(3.5)/Cl(97)/HCO3(36)/BUN(21)/Cr(1.41)Glu(156)/Ca(8.9)/Mg(1.8)/PO4(3.5)     @ 07:10  Na(143)/K(4.1)/Cl(100)/HCO3(35)/BUN(24)/Cr(1.41)Glu(118)/Ca(9.0)/Mg(2.1)/PO4(3.0)     @ 16:13    Urinalysis Basic - ( 23 Mar 2023 13:47 )    Color: Light Orange / Appearance: Slightly Turbid / S.010 / pH: x  Gluc: x / Ketone: Negative  / Bili: Negative / Urobili: Negative   Blood: x / Protein: 30 mg/dL / Nitrite: Negative   Leuk Esterase: Large / RBC: 15 /hpf /  /HPF   Sq Epi: x / Non Sq Epi: 0 /hpf / Bacteria: Negative            IMAGING:      ASSESSMENT:    Patient is a 80 year old F with a PMHx of HTN, HLD, T2DM, HFpEF (EF 65% X/), breast cancer s/p R partial mastectomy, rectal carcinoma s/p resection, CAD s/p CABG (2020) recent ERCP , with cholecystotomy tube placement  who presents with sob  home bumex 1 mg PO     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          Thank you for involving [facility] in this patient's care.    With warm regards    Brenda Rader  [facility]  Office: [phone]

## 2023-03-23 NOTE — PROGRESS NOTE ADULT - ASSESSMENT
82F history of HTN, HLD, DM2, HFpEF (EF 65%), CAD s/p CABG, Breast Ca s/p R partial mastectomy, Rectal Ca s/p resection presenting with abdominal pain x 1 week found to have acute cholecystitis, not a surgical candidate. S/p ERCP with stent placement course c/b gallbladder perforation s/p perc rissa w/ fluid c/f candida and GPCs on zosyn, fluconazole; perc rissa dislodged on 2/1/23, HIDA negative for cholecystitis, no further intervention per surgery and IR. 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 New hematuria 2/2/23, resolved on 2/4/23, eliquis resumed. Pending dispo to [facility].    83F with PMH of HTN, HLD, DM2, HFpEF (EF 65%), CAD s/p CABG, Breast CA s/p R partial mastectomy, rectal CA s/p resection with recent admission for acute rissa s/p ERCP w/ stent c/b gallbladder perf w/ perc rissa and recurrent SVT presenting for 1 week of shortness of breath and fluid retention concerning for possible HF exacerbation.

## 2023-03-23 NOTE — PHYSICAL THERAPY INITIAL EVALUATION ADULT - GENERAL OBSERVATIONS, REHAB EVAL
Received supine upright in bed +2L supp 02 via NC, +tele, +ext female catheter, +chronic RUE lymphedema. Daughter at bedside.

## 2023-03-24 LAB
ANION GAP SERPL CALC-SCNC: 9 MMOL/L — SIGNIFICANT CHANGE UP (ref 5–17)
BUN SERPL-MCNC: 19 MG/DL — SIGNIFICANT CHANGE UP (ref 7–23)
CALCIUM SERPL-MCNC: 8.8 MG/DL — SIGNIFICANT CHANGE UP (ref 8.4–10.5)
CHLORIDE SERPL-SCNC: 97 MMOL/L — SIGNIFICANT CHANGE UP (ref 96–108)
CO2 SERPL-SCNC: 35 MMOL/L — HIGH (ref 22–31)
CREAT SERPL-MCNC: 1.28 MG/DL — SIGNIFICANT CHANGE UP (ref 0.5–1.3)
EGFR: 41 ML/MIN/1.73M2 — LOW
GLUCOSE BLDC GLUCOMTR-MCNC: 138 MG/DL — HIGH (ref 70–99)
GLUCOSE BLDC GLUCOMTR-MCNC: 153 MG/DL — HIGH (ref 70–99)
GLUCOSE BLDC GLUCOMTR-MCNC: 175 MG/DL — HIGH (ref 70–99)
GLUCOSE BLDC GLUCOMTR-MCNC: 189 MG/DL — HIGH (ref 70–99)
GLUCOSE SERPL-MCNC: 176 MG/DL — HIGH (ref 70–99)
HCT VFR BLD CALC: 35.1 % — SIGNIFICANT CHANGE UP (ref 34.5–45)
HGB BLD-MCNC: 10.5 G/DL — LOW (ref 11.5–15.5)
MAGNESIUM SERPL-MCNC: 1.9 MG/DL — SIGNIFICANT CHANGE UP (ref 1.6–2.6)
MCHC RBC-ENTMCNC: 29.2 PG — SIGNIFICANT CHANGE UP (ref 27–34)
MCHC RBC-ENTMCNC: 29.9 GM/DL — LOW (ref 32–36)
MCV RBC AUTO: 97.5 FL — SIGNIFICANT CHANGE UP (ref 80–100)
NRBC # BLD: 0 /100 WBCS — SIGNIFICANT CHANGE UP (ref 0–0)
PHOSPHATE SERPL-MCNC: 3.3 MG/DL — SIGNIFICANT CHANGE UP (ref 2.5–4.5)
PLATELET # BLD AUTO: 399 K/UL — SIGNIFICANT CHANGE UP (ref 150–400)
POTASSIUM SERPL-MCNC: 3.8 MMOL/L — SIGNIFICANT CHANGE UP (ref 3.5–5.3)
POTASSIUM SERPL-SCNC: 3.8 MMOL/L — SIGNIFICANT CHANGE UP (ref 3.5–5.3)
RBC # BLD: 3.6 M/UL — LOW (ref 3.8–5.2)
RBC # FLD: 15.7 % — HIGH (ref 10.3–14.5)
SODIUM SERPL-SCNC: 141 MMOL/L — SIGNIFICANT CHANGE UP (ref 135–145)
WBC # BLD: 9.45 K/UL — SIGNIFICANT CHANGE UP (ref 3.8–10.5)
WBC # FLD AUTO: 9.45 K/UL — SIGNIFICANT CHANGE UP (ref 3.8–10.5)

## 2023-03-24 PROCEDURE — 71250 CT THORAX DX C-: CPT | Mod: 26

## 2023-03-24 PROCEDURE — 99233 SBSQ HOSP IP/OBS HIGH 50: CPT

## 2023-03-24 RX ORDER — IPRATROPIUM/ALBUTEROL SULFATE 18-103MCG
3 AEROSOL WITH ADAPTER (GRAM) INHALATION EVERY 6 HOURS
Refills: 0 | Status: DISCONTINUED | OUTPATIENT
Start: 2023-03-24 | End: 2023-03-30

## 2023-03-24 RX ADMIN — Medication 7 UNIT(S): at 12:54

## 2023-03-24 RX ADMIN — Medication 1: at 17:58

## 2023-03-24 RX ADMIN — BUMETANIDE 1 MILLIGRAM(S): 0.25 INJECTION INTRAMUSCULAR; INTRAVENOUS at 05:25

## 2023-03-24 RX ADMIN — Medication 1: at 09:01

## 2023-03-24 RX ADMIN — Medication 81 MILLIGRAM(S): at 12:53

## 2023-03-24 RX ADMIN — Medication 100 MILLIGRAM(S): at 05:26

## 2023-03-24 RX ADMIN — Medication 3 MILLILITER(S): at 18:00

## 2023-03-24 RX ADMIN — INSULIN GLARGINE 21 UNIT(S): 100 INJECTION, SOLUTION SUBCUTANEOUS at 21:40

## 2023-03-24 RX ADMIN — Medication 100 MILLIGRAM(S): at 18:00

## 2023-03-24 RX ADMIN — ATORVASTATIN CALCIUM 40 MILLIGRAM(S): 80 TABLET, FILM COATED ORAL at 21:15

## 2023-03-24 RX ADMIN — APIXABAN 2.5 MILLIGRAM(S): 2.5 TABLET, FILM COATED ORAL at 18:00

## 2023-03-24 RX ADMIN — CEFTRIAXONE 100 MILLIGRAM(S): 500 INJECTION, POWDER, FOR SOLUTION INTRAMUSCULAR; INTRAVENOUS at 20:05

## 2023-03-24 RX ADMIN — GABAPENTIN 200 MILLIGRAM(S): 400 CAPSULE ORAL at 21:14

## 2023-03-24 RX ADMIN — Medication 7 UNIT(S): at 09:02

## 2023-03-24 RX ADMIN — Medication 1: at 12:53

## 2023-03-24 RX ADMIN — BUMETANIDE 1 MILLIGRAM(S): 0.25 INJECTION INTRAMUSCULAR; INTRAVENOUS at 13:07

## 2023-03-24 RX ADMIN — Medication 7 UNIT(S): at 17:59

## 2023-03-24 RX ADMIN — Medication 24 MILLIGRAM(S): at 18:01

## 2023-03-24 RX ADMIN — PANTOPRAZOLE SODIUM 40 MILLIGRAM(S): 20 TABLET, DELAYED RELEASE ORAL at 05:26

## 2023-03-24 RX ADMIN — PANTOPRAZOLE SODIUM 40 MILLIGRAM(S): 20 TABLET, DELAYED RELEASE ORAL at 18:00

## 2023-03-24 RX ADMIN — APIXABAN 2.5 MILLIGRAM(S): 2.5 TABLET, FILM COATED ORAL at 05:26

## 2023-03-24 NOTE — PROVIDER CONTACT NOTE (CHANGE IN STATUS NOTIFICATION) - SITUATION
Provider made aware that patient had 8 seconds of paroxysmal atrial fibrillation rate in 120's and went back into NSR.

## 2023-03-24 NOTE — PROGRESS NOTE ADULT - SUBJECTIVE AND OBJECTIVE BOX
Overnight events noted      VITAL:  T(C): , Max: 36.7 (23 @ 04:56)  T(F): , Max: 98 (23 @ 04:56)  HR: 73 (23 @ 14:07)  BP: 151/60 (23 @ 14:07)  BP(mean): --  RR: 18 (23 @ 14:07)  SpO2: 94% (23 @ 14:07)  Wt(kg): --      PHYSICAL EXAM:  General: Alerted, oriented  HEENT: MMM  Lungs:CTA-b/l  Heart: RRR S1S2  Abdomen: Soft, NTND  Ext: edema b/l  : no chen      LABS:                        10.5   9.45  )-----------( 399      ( 24 Mar 2023 12:37 )             35.1     Na(141)/K(3.8)/Cl(97)/HCO3(35)/BUN(19)/Cr(1.28)Glu(176)/Ca(8.8)/Mg(1.9)/PO4(3.3)     @ 12:37  Na(141)/K(3.6)/Cl(96)/HCO3(36)/BUN(21)/Cr(1.30)Glu(124)/Ca(8.9)/Mg(1.7)/PO4(2.5)     @ 17:36  Na(144)/K(3.5)/Cl(97)/HCO3(36)/BUN(21)/Cr(1.41)Glu(156)/Ca(8.9)/Mg(1.8)/PO4(3.5)     @ 07:10  Na(143)/K(4.1)/Cl(100)/HCO3(35)/BUN(24)/Cr(1.41)Glu(118)/Ca(9.0)/Mg(2.1)/PO4(3.0)     @ 16:13    Urinalysis Basic - ( 23 Mar 2023 13:47 )    Color: Light Orange / Appearance: Slightly Turbid / S.010 / pH: x  Gluc: x / Ketone: Negative  / Bili: Negative / Urobili: Negative   Blood: x / Protein: 30 mg/dL / Nitrite: Negative   Leuk Esterase: Large / RBC: 15 /hpf /  /HPF   Sq Epi: x / Non Sq Epi: 0 /hpf / Bacteria: Negative        ASSESSMENT:    Patient is a 80 year old F with a PMHx of HTN, HLD, T2DM, HFpEF (EF 65% X/), breast cancer s/p R partial mastectomy, rectal carcinoma s/p resection, CAD s/p CABG (2020) recent ERCP , with cholecystotomy tube placement  who presents with sob  home bumex 1 mg PO     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,  nephrology awared , previous diuretic test showed minimal  function of that kidney)  CVS----blood pressure stable   hypervolumic --elevated  BNP   -potassium border line   -UA active     RECOMMENDATION:  1)Renal: renal fxn intact  a-w bumex 1m gb id iv   give potassium  20  meq  today   avoid nephrotoxic medication  dose all medications for gfr 30-35   ml/min  daily bmp,   renal diet   monitor I/O  2)CVS: BP stable cont  metoprolol 100 mg po bid.        Thank you for involving [facility] in this patient's care.    With warm regards    Brenda Rader  [facility]  Office: [phone]

## 2023-03-24 NOTE — PROGRESS NOTE ADULT - SUBJECTIVE AND OBJECTIVE BOX
[facility] Division of Hospital Medicine  Kati Khan MD  Pager (M-F, 3C-8F): [phone]  Other Times:  [phone]    Patient is a 83y old  Female who presents with a chief complaint of shortness of breath (23 Mar 2023 18:47)      SUBJECTIVE / OVERNIGHT EVENTS: No acute events. Pt continues to endorse dyspnea despite diuresis, still requiring supplemental O2.     Translation services were offered to the patient but patient declined preferring her daughter, Shauna at bedside, to serve as ad hoc     ADDITIONAL REVIEW OF SYSTEMS: Negative    MEDICATIONS  (STANDING):  albuterol/ipratropium for Nebulization 3 milliLiter(s) Nebulizer every 6 hours  apixaban 2.5 milliGRAM(s) Oral every 12 hours  aspirin  chewable 81 milliGRAM(s) Oral daily  atorvastatin 40 milliGRAM(s) Oral at bedtime  buMETAnide Injectable 1 milliGRAM(s) IV Push two times a day  cefTRIAXone   IVPB      cefTRIAXone   IVPB 1000 milliGRAM(s) IV Intermittent every 24 hours  dextrose 5%. 1000 milliLiter(s) (100 mL/Hr) IV Continuous <Continuous>  dextrose 5%. 1000 milliLiter(s) (50 mL/Hr) IV Continuous <Continuous>  dextrose 50% Injectable 25 Gram(s) IV Push once  dextrose 50% Injectable 12.5 Gram(s) IV Push once  dextrose 50% Injectable 25 Gram(s) IV Push once  gabapentin 200 milliGRAM(s) Oral at bedtime  glucagon  Injectable 1 milliGRAM(s) IntraMuscular once  insulin glargine Injectable (LANTUS) 21 Unit(s) SubCutaneous at bedtime  insulin lispro (ADMELOG) corrective regimen sliding scale   SubCutaneous three times a day before meals  insulin lispro (ADMELOG) corrective regimen sliding scale   SubCutaneous at bedtime  insulin lispro Injectable (ADMELOG) 7 Unit(s) SubCutaneous three times a day before meals  metoprolol tartrate 100 milliGRAM(s) Oral every 12 hours  pantoprazole    Tablet 40 milliGRAM(s) Oral every 12 hours    MEDICATIONS  (PRN):  dextrose Oral Gel 15 Gram(s) Oral once PRN Blood Glucose LESS THAN 70 milliGRAM(s)/deciliter    I&O's Summary    I&O's Summary    23 Mar 2023 07:01  -  24 Mar 2023 07:00  --------------------------------------------------------  IN: 420 mL / OUT: 650 mL / NET: -230 mL      PHYSICAL EXAM:  T(C): 36.7 (23 @ 04:56), Max: 36.7 (23 @ 04:56)  T(F): 98 (23 @ 04:56), Max: 98 (23 @ 04:56)  HR: 72 (23 @ 13:09) (68 - 77)  BP: 144/79 (23 @ 13:09) (117/71 - 144/79)  RR: 18 (23 @ 04:56) (18 - 18)  SpO2: 94% (23 @ 04:56) (93% - 94%)  Wt(kg): --    Daily Weight in k.6 (23 Mar 2023 09:18)    CONSTITUTIONAL: mild resp distress, well-appearing elderly woman  EYES: PERRLA; conjunctiva and sclera clear  ENMT: Moist oral mucosa, no pharyngeal injection or exudates  NECK: Supple, no palpable masses; no thyromegaly  RESPIRATORY: Normal respiratory effort; mild wheezing on exam,   CARDIOVASCULAR: Regular rate and rhythm, normal S1 and S2, no murmur/rub/gallop; improved lower extremity edema  ABDOMEN: Nontender to palpation, normoactive bowel sounds, no rebound/guarding  MUSCULOSKELETAL: RUE chronic lymphedema  PSYCH: A+O to person, place, and time; affect appropriate  NEUROLOGY: CN 2-12 are intact and symmetric; no gross sensory deficits   SKIN: chronic venous stasis changes    LABS: reviewed                                            10.5   9.45  )-----------( 399      ( 24 Mar 2023 12:37 )             35.1           141  |  97  |  19  ----------------------------<  176<H>  3.8   |  35<H>  |  1.28    Ca    8.8      24 Mar 2023 12:37  Phos  3.3       Mg     1.9         TPro  6.6  /  Alb  3.1<L>  /  TBili  0.3  /  DBili  x   /  AST  21  /  ALT  19  /  AlkPhos  84                Urinalysis Basic - ( 23 Mar 2023 13:47 )    Color: Light Orange / Appearance: Slightly Turbid / S.010 / pH: x  Gluc: x / Ketone: Negative  / Bili: Negative / Urobili: Negative   Blood: x / Protein: 30 mg/dL / Nitrite: Negative   Leuk Esterase: Large / RBC: 15 /hpf /  /HPF   Sq Epi: x / Non Sq Epi: 0 /hpf / Bacteria: Negative        PT/INR - ( 22 Mar 2023 16:13 )   PT: 15.7 sec;   INR: 1.36 ratio         PTT - ( 22 Mar 2023 16:13 )  PTT:29.5 sec          CAPILLARY BLOOD GLUCOSE      POCT Blood Glucose.: 175 mg/dL (24 Mar 2023 12:44)

## 2023-03-24 NOTE — PROGRESS NOTE ADULT - SUBJECTIVE AND OBJECTIVE BOX
DATE OF SERVICE: 03-24-23 @ 12:48    Patient is a 83y old  Female who presents with a chief complaint of shortness of breath (23 Mar 2023 15:49)      INTERVAL HISTORY: Feels ok.     REVIEW OF SYSTEMS:  CONSTITUTIONAL: No weakness  EYES/ENT: No visual changes;  No throat pain   NECK: No pain or stiffness  RESPIRATORY: No cough, wheezing; + shortness of breath  CARDIOVASCULAR: No chest pain or palpitations  GASTROINTESTINAL: No abdominal  pain. No nausea, vomiting, or hematemesis  GENITOURINARY: No dysuria, frequency or hematuria  NEUROLOGICAL: No stroke like symptoms  SKIN: No rashes    TELEMETRY Personally reviewed: SB/SR 50-70  	  MEDICATIONS:  buMETAnide Injectable 1 milliGRAM(s) IV Push two times a day  metoprolol tartrate 100 milliGRAM(s) Oral every 12 hours        PHYSICAL EXAM:  T(C): 36.7 (03-24-23 @ 04:56), Max: 36.7 (03-24-23 @ 04:56)  HR: 75 (03-24-23 @ 04:56) (68 - 77)  BP: 131/83 (03-24-23 @ 04:56) (117/71 - 131/83)  RR: 18 (03-24-23 @ 04:56) (18 - 18)  SpO2: 94% (03-24-23 @ 04:56) (93% - 94%)  Wt(kg): --  I&O's Summary    23 Mar 2023 07:01  -  24 Mar 2023 07:00  --------------------------------------------------------  IN: 420 mL / OUT: 650 mL / NET: -230 mL          Appearance: In no distress	  HEENT:    PERRL, EOMI	  Cardiovascular:  S1 S2, No JVD  Respiratory: B/L crackles, wheezing  Gastrointestinal:  Soft, Non-tender, + BS	  Vascularature:  dep edema of LE  Psychiatric: Appropriate affect   Neuro: no acute focal deficits                               10.5   9.45  )-----------( 399      ( 24 Mar 2023 12:37 )             35.1     03-23    141  |  96  |  21  ----------------------------<  124<H>  3.6   |  36<H>  |  1.30    Ca    8.9      23 Mar 2023 17:36  Phos  2.5     03-23  Mg     1.7     03-23    TPro  6.6  /  Alb  3.1<L>  /  TBili  0.3  /  DBili  x   /  AST  21  /  ALT  19  /  AlkPhos  84  03-23        Labs personally reviewed      ASSESSMENT/PLAN: 	      83F w/ PMHx HTN, HLD, DM2, HFpEF (EF 65%), CAD s/p CABG, Breast CA s/p R partial mastectomy, rectal CA s/p resection with recent admission for acute rissa s/p ERCP w/ stent c/b gallbladder perf w/ perc rissa and recurrent SVT presenting for 1 week of shortness of breath and fluid retention. Patient has been at [facility] since her discharge from the hospital on 2/7/23. About a week ago she woke up in the middle of the night short of breath which continued to persist. She additionally complaints of epigastric burning sensation and early satiety. At [facility] she was placed on oxygen with minimal improvement in respiratory status, also received additional lasix 40 and metolazone today prior to coming to the ED. Denies CP, palpitations or syncope.    Problem/Plan -1  Problem: Acute on Chronic Diastolic CHF  - Patient with severe pulm pressures on echo  - CXR with pulm edema  - proBNP 3205  - Repeat TTE shows preserved EF, hyperdynamic LV function, basal inferoseptum hypokinesis, decreased RV systolic functin, severe pulm pressures, B/L pleural effusions  - c/w Bumex 1mg IV BID, Cr improving  - Strict I &Os, daily weights    Problem/Plan -2  Problem: CAD   - s/p CABG 2020  - ECG with no ischemia noted  - Trop 21  - c/w ASA, metorpolol and statin  - TTE shows basal inferoseptum hypokinesis  - c/o chronic epigastric burning most likely noncardiac in nature given patient's recent cholecystitis     Problem/Plan -3  Problem: hx of Recurrent SVT  - c/w Metoprolol 100mg PO BID  - cont to monitor on tele    Problem/Plan -4  Problem: Atrial Fibrillation  - Patient with n/o A-flutter on previous admission  - c/w Metoprolol   - c/w eliquis 2.5mg PO BID        Delaney Vasquez, AG-NP   Sergey Winchester,  [facility]  Cardiovascular Medicine  [address] [place] [address], [address]  Available through call or text on [employer]  Office: [phone]   DATE OF SERVICE: 03-24-23 @ 12:48    Patient is a 83y old  Female who presents with a chief complaint of shortness of breath (23 Mar 2023 15:49)      INTERVAL HISTORY: Feels ok.     REVIEW OF SYSTEMS:  CONSTITUTIONAL: No weakness  EYES/ENT: No visual changes;  No throat pain   NECK: No pain or stiffness  RESPIRATORY: No cough, wheezing; + shortness of breath  CARDIOVASCULAR: No chest pain or palpitations  GASTROINTESTINAL: No abdominal  pain. No nausea, vomiting, or hematemesis  GENITOURINARY: No dysuria, frequency or hematuria  NEUROLOGICAL: No stroke like symptoms  SKIN: No rashes    TELEMETRY Personally reviewed: SB/SR 50-70  	  MEDICATIONS:  buMETAnide Injectable 1 milliGRAM(s) IV Push two times a day  metoprolol tartrate 100 milliGRAM(s) Oral every 12 hours        PHYSICAL EXAM:  T(C): 36.7 (03-24-23 @ 04:56), Max: 36.7 (03-24-23 @ 04:56)  HR: 75 (03-24-23 @ 04:56) (68 - 77)  BP: 131/83 (03-24-23 @ 04:56) (117/71 - 131/83)  RR: 18 (03-24-23 @ 04:56) (18 - 18)  SpO2: 94% (03-24-23 @ 04:56) (93% - 94%)  Wt(kg): --  I&O's Summary    23 Mar 2023 07:01  -  24 Mar 2023 07:00  --------------------------------------------------------  IN: 420 mL / OUT: 650 mL / NET: -230 mL          Appearance: In no distress	  HEENT:    PERRL, EOMI	  Cardiovascular:  S1 S2, No JVD  Respiratory: B/L crackles, wheezing  Gastrointestinal:  Soft, Non-tender, + BS	  Vascularature:  dep edema of LE  Psychiatric: Appropriate affect   Neuro: no acute focal deficits                               10.5   9.45  )-----------( 399      ( 24 Mar 2023 12:37 )             35.1     03-23    141  |  96  |  21  ----------------------------<  124<H>  3.6   |  36<H>  |  1.30    Ca    8.9      23 Mar 2023 17:36  Phos  2.5     03-23  Mg     1.7     03-23    TPro  6.6  /  Alb  3.1<L>  /  TBili  0.3  /  DBili  x   /  AST  21  /  ALT  19  /  AlkPhos  84  03-23        Labs personally reviewed      ASSESSMENT/PLAN: 	      83F w/ PMHx HTN, HLD, DM2, HFpEF (EF 65%), CAD s/p CABG, Breast CA s/p R partial mastectomy, rectal CA s/p resection with recent admission for acute rissa s/p ERCP w/ stent c/b gallbladder perf w/ perc rissa and recurrent SVT presenting for 1 week of shortness of breath and fluid retention. Patient has been at [facility] since her discharge from the hospital on 2/7/23. About a week ago she woke up in the middle of the night short of breath which continued to persist. She additionally complaints of epigastric burning sensation and early satiety. At [facility] she was placed on oxygen with minimal improvement in respiratory status, also received additional lasix 40 and metolazone today prior to coming to the ED. Denies CP, palpitations or syncope.    Problem/Plan -1  Problem: Acute on Chronic Diastolic CHF  - Patient with severe pulm pressures on echo  - CXR with pulm edema  - proBNP 3205  - Repeat TTE shows preserved EF, hyperdynamic LV function, basal inferoseptum hypokinesis, decreased RV systolic functin, severe pulm pressures, B/L pleural effusions  - c/w Bumex 1mg IV BID, Cr improving  - Strict I &Os, daily weights    Problem/Plan -2  Problem: CAD   - s/p CABG 2020  - ECG with no ischemia noted  - Trop 21  - c/w ASA, metorpolol and statin  - TTE shows basal inferoseptum hypokinesis  - c/o chronic epigastric burning most likely noncardiac in nature given patient's recent cholecystitis     Problem/Plan -3  Problem: hx of Recurrent SVT  - c/w Metoprolol 100mg PO BID  - cont to monitor on tele    Problem/Plan -4  Problem: Atrial Fibrillation  - Patient with n/o A-flutter on previous admission  - c/w Metoprolol   - c/w eliquis 2.5mg PO BID      Delaney Vasquez, AG-NP   Sergey Winchester,  [facility]  Cardiovascular Medicine  [address] [place] [address], [address]  Available through call or text on [employer]  Office: [phone]

## 2023-03-24 NOTE — PROGRESS NOTE ADULT - ASSESSMENT
83F with PMH of HTN, HLD, DM2, HFpEF (EF 65%), CAD s/p CABG, Breast CA s/p R partial mastectomy, rectal CA s/p resection with recent admission for acute rissa s/p ERCP w/ stent c/b gallbladder perf w/ perc rissa and recurrent SVT presenting for 1 week of shortness of breath and fluid retention concerning for possible HF exacerbation.

## 2023-03-24 NOTE — PROGRESS NOTE ADULT - PROBLEM SELECTOR PLAN 5
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  Creatinine currently at baseline  Avoid nephrotoxins

## 2023-03-24 NOTE — PROGRESS NOTE ADULT - PROBLEM SELECTOR PLAN 4
Recent admission for acute rissa s/p ERCP w/ stent c/b gallbladder perforation s/p perc rissa with fluid cultures positive for candida and GPCs s/p IV antibiotics. Perc rissa dislodged 2/1, per IR stable. Pt was planned for outpatient ERCP next thurs 3/29  -consider advanced GI consult for ERCP once patient optimized from HF standpoint  -low suspicion for intra-abdominal infection at this moment; no indication for antibiotics for now

## 2023-03-24 NOTE — PROGRESS NOTE ADULT - PROBLEM SELECTOR PLAN 2
done Likely 2/2 HF exac above; however pt also noted with hypercarbic and hypoxic respiratory failure and severe pulmonary hypertension on ECHO, with decreased RVSF; pt reports significant second hand smoke exposure  -CXR with Left basilar pleural effusion and/or atelectasis.  -RVP negative  -Cont diuresis as above  -Wean off O2  -CT chest pending  -Low suspicion for PE as pt is on eliquis; however if CT chest negative, will obain VQ scan to r/o CTEPH  -Given wheezing on exam and mild respiratory distress on exam, will start medrol dosepac, duonebs  -Pulm consulted

## 2023-03-24 NOTE — PROGRESS NOTE ADULT - PROBLEM SELECTOR PLAN 1
TTE as above with EF 75%, b/l pleural effusions, BNP elevated but decreased compared to prior  -cont diuresis with Bumex 1mg BID for now  -Strict Is/Os, daily weights, 1L fluid restriction  -cardiology recs appreciated

## 2023-03-25 LAB
ANION GAP SERPL CALC-SCNC: 22 MMOL/L — HIGH (ref 5–17)
BUN SERPL-MCNC: 21 MG/DL — SIGNIFICANT CHANGE UP (ref 7–23)
CALCIUM SERPL-MCNC: 9.3 MG/DL — SIGNIFICANT CHANGE UP (ref 8.4–10.5)
CHLORIDE SERPL-SCNC: 89 MMOL/L — LOW (ref 96–108)
CO2 SERPL-SCNC: 38 MMOL/L — HIGH (ref 22–31)
CREAT SERPL-MCNC: 1.26 MG/DL — SIGNIFICANT CHANGE UP (ref 0.5–1.3)
EGFR: 42 ML/MIN/1.73M2 — LOW
GLUCOSE BLDC GLUCOMTR-MCNC: 184 MG/DL — HIGH (ref 70–99)
GLUCOSE BLDC GLUCOMTR-MCNC: 185 MG/DL — HIGH (ref 70–99)
GLUCOSE BLDC GLUCOMTR-MCNC: 234 MG/DL — HIGH (ref 70–99)
GLUCOSE BLDC GLUCOMTR-MCNC: 247 MG/DL — HIGH (ref 70–99)
GLUCOSE SERPL-MCNC: 235 MG/DL — HIGH (ref 70–99)
MAGNESIUM SERPL-MCNC: 1.9 MG/DL — SIGNIFICANT CHANGE UP (ref 1.6–2.6)
PHOSPHATE SERPL-MCNC: 2.7 MG/DL — SIGNIFICANT CHANGE UP (ref 2.5–4.5)
POTASSIUM SERPL-MCNC: 4.7 MMOL/L — SIGNIFICANT CHANGE UP (ref 3.5–5.3)
POTASSIUM SERPL-SCNC: 4.7 MMOL/L — SIGNIFICANT CHANGE UP (ref 3.5–5.3)
SODIUM SERPL-SCNC: 149 MMOL/L — HIGH (ref 135–145)

## 2023-03-25 PROCEDURE — 99233 SBSQ HOSP IP/OBS HIGH 50: CPT

## 2023-03-25 RX ORDER — BUMETANIDE 0.25 MG/ML
1 INJECTION INTRAMUSCULAR; INTRAVENOUS
Qty: 20 | Refills: 0 | Status: DISCONTINUED | OUTPATIENT
Start: 2023-03-25 | End: 2023-03-27

## 2023-03-25 RX ADMIN — Medication 3 MILLILITER(S): at 00:00

## 2023-03-25 RX ADMIN — BUMETANIDE 5 MG/HR: 0.25 INJECTION INTRAMUSCULAR; INTRAVENOUS at 18:41

## 2023-03-25 RX ADMIN — ATORVASTATIN CALCIUM 40 MILLIGRAM(S): 80 TABLET, FILM COATED ORAL at 21:13

## 2023-03-25 RX ADMIN — Medication 100 MILLIGRAM(S): at 17:54

## 2023-03-25 RX ADMIN — Medication 7 UNIT(S): at 17:46

## 2023-03-25 RX ADMIN — Medication 81 MILLIGRAM(S): at 14:14

## 2023-03-25 RX ADMIN — CEFTRIAXONE 100 MILLIGRAM(S): 500 INJECTION, POWDER, FOR SOLUTION INTRAMUSCULAR; INTRAVENOUS at 20:39

## 2023-03-25 RX ADMIN — Medication 2: at 07:00

## 2023-03-25 RX ADMIN — INSULIN GLARGINE 21 UNIT(S): 100 INJECTION, SOLUTION SUBCUTANEOUS at 21:52

## 2023-03-25 RX ADMIN — Medication 8 MILLIGRAM(S): at 21:13

## 2023-03-25 RX ADMIN — PANTOPRAZOLE SODIUM 40 MILLIGRAM(S): 20 TABLET, DELAYED RELEASE ORAL at 17:54

## 2023-03-25 RX ADMIN — BUMETANIDE 1 MILLIGRAM(S): 0.25 INJECTION INTRAMUSCULAR; INTRAVENOUS at 06:00

## 2023-03-25 RX ADMIN — Medication 4 MILLIGRAM(S): at 18:13

## 2023-03-25 RX ADMIN — Medication 3 MILLILITER(S): at 06:00

## 2023-03-25 RX ADMIN — Medication 4 MILLIGRAM(S): at 14:35

## 2023-03-25 RX ADMIN — APIXABAN 2.5 MILLIGRAM(S): 2.5 TABLET, FILM COATED ORAL at 17:53

## 2023-03-25 RX ADMIN — Medication 100 MILLIGRAM(S): at 06:00

## 2023-03-25 RX ADMIN — PANTOPRAZOLE SODIUM 40 MILLIGRAM(S): 20 TABLET, DELAYED RELEASE ORAL at 06:00

## 2023-03-25 RX ADMIN — Medication 4 MILLIGRAM(S): at 07:00

## 2023-03-25 RX ADMIN — BUMETANIDE 1 MILLIGRAM(S): 0.25 INJECTION INTRAMUSCULAR; INTRAVENOUS at 14:34

## 2023-03-25 RX ADMIN — Medication 7 UNIT(S): at 12:50

## 2023-03-25 RX ADMIN — Medication 3 MILLILITER(S): at 12:25

## 2023-03-25 RX ADMIN — Medication 1: at 17:46

## 2023-03-25 RX ADMIN — Medication 1: at 12:50

## 2023-03-25 RX ADMIN — GABAPENTIN 200 MILLIGRAM(S): 400 CAPSULE ORAL at 21:13

## 2023-03-25 RX ADMIN — Medication 7 UNIT(S): at 07:00

## 2023-03-25 RX ADMIN — APIXABAN 2.5 MILLIGRAM(S): 2.5 TABLET, FILM COATED ORAL at 06:00

## 2023-03-25 RX ADMIN — Medication 3 MILLILITER(S): at 17:56

## 2023-03-25 NOTE — PROGRESS NOTE ADULT - PROBLEM SELECTOR PLAN 1
TTE as above with EF 75%, b/l pleural effusions, BNP elevated   -Consider increasing to bumex gtt due to lack of symptomatic improvement  -Strict Is/Os, daily weights, 1L fluid restriction  -cardiology recs appreciated

## 2023-03-25 NOTE — PROGRESS NOTE ADULT - PROBLEM SELECTOR PLAN 2
Likely 2/2 HF exac above; however pt also noted with hypercarbic and hypoxic respiratory failure and severe pulmonary hypertension on ECHO, with decreased RVSF; pt reports significant second hand smoke exposure  -CXR with Left basilar pleural effusion and/or atelectasis.  -RVP negative  -Cont diuresis as above  -Wean off O2  -CT chest with pulmonary edema and b/l pleural effusions, no PNA  -Low suspicion for PE as pt is on eliquis; however if CT chest negative, will obain VQ scan to r/o CTEPH  -Cont supportive care  -Pulm recs appreciated

## 2023-03-25 NOTE — PROGRESS NOTE ADULT - SUBJECTIVE AND OBJECTIVE BOX
[facility] Division of Hospital Medicine  Nickolas Almanzar MD  Pager (M-F, 8A-5P): [phone]  Other Times:  [phone]    Patient is a 83y old  Female who presents with a chief complaint of shortness of breath (23 Mar 2023 18:47)      SUBJECTIVE / OVERNIGHT EVENTS: No acute events. Pt continues to endorse dyspnea despite diuresis, still requiring supplemental O2.   [place]  [id number]    ADDITIONAL REVIEW OF SYSTEMS: Negative    MEDICATIONS  (STANDING):  albuterol/ipratropium for Nebulization 3 milliLiter(s) Nebulizer every 6 hours  apixaban 2.5 milliGRAM(s) Oral every 12 hours  aspirin  chewable 81 milliGRAM(s) Oral daily  atorvastatin 40 milliGRAM(s) Oral at bedtime  buMETAnide Injectable 1 milliGRAM(s) IV Push two times a day  cefTRIAXone   IVPB      cefTRIAXone   IVPB 1000 milliGRAM(s) IV Intermittent every 24 hours  dextrose 5%. 1000 milliLiter(s) (100 mL/Hr) IV Continuous <Continuous>  dextrose 5%. 1000 milliLiter(s) (50 mL/Hr) IV Continuous <Continuous>  dextrose 50% Injectable 25 Gram(s) IV Push once  dextrose 50% Injectable 12.5 Gram(s) IV Push once  dextrose 50% Injectable 25 Gram(s) IV Push once  gabapentin 200 milliGRAM(s) Oral at bedtime  glucagon  Injectable 1 milliGRAM(s) IntraMuscular once  insulin glargine Injectable (LANTUS) 21 Unit(s) SubCutaneous at bedtime  insulin lispro (ADMELOG) corrective regimen sliding scale   SubCutaneous three times a day before meals  insulin lispro (ADMELOG) corrective regimen sliding scale   SubCutaneous at bedtime  insulin lispro Injectable (ADMELOG) 7 Unit(s) SubCutaneous three times a day before meals  methylPREDNISolone   Oral   methylPREDNISolone 4 milliGRAM(s) Oral before breakfast  methylPREDNISolone 4 milliGRAM(s) Oral after lunch  methylPREDNISolone 4 milliGRAM(s) Oral after dinner  methylPREDNISolone 8 milliGRAM(s) Oral at bedtime  metoprolol tartrate 100 milliGRAM(s) Oral every 12 hours  pantoprazole    Tablet 40 milliGRAM(s) Oral every 12 hours    MEDICATIONS  (PRN):  dextrose Oral Gel 15 Gram(s) Oral once PRN Blood Glucose LESS THAN 70 milliGRAM(s)/deciliter    I&O's Summary    24 Mar 2023 07:01  -  25 Mar 2023 07:00  --------------------------------------------------------  IN: 0 mL / OUT: 800 mL / NET: -800 mL    PHYSICAL EXAM:  T(C): 36.3 (23 @ 14:37), Max: 36.5 (23 @ 20:41)  T(F): 97.3 (23 @ 14:37), Max: 97.7 (23 @ 20:41)  HR: 68 (23 @ 14:37) (64 - 76)  BP: 142/68 (23 @ 14:37) (137/65 - 178/81)  RR: 18 (23 @ 14:37) (18 - 18)  SpO2: 95% (23 @ 14:37) (94% - 96%)  Wt(kg): --  Daily Weight in k.6 (23 Mar 2023 09:18)    CONSTITUTIONAL: still in mild resp distress, well-appearing elderly woman  EYES: PERRLA; conjunctiva and sclera clear  ENMT: Moist oral mucosa, no pharyngeal injection or exudates  NECK: Supple, no palpable masses; no thyromegaly  RESPIRATORY: Normal respiratory effort; mild wheezing on exam,   CARDIOVASCULAR: Regular rate and rhythm, normal S1 and S2, no murmur/rub/gallop; improved lower extremity edema  ABDOMEN: Nontender to palpation, normoactive bowel sounds, no rebound/guarding  MUSCULOSKELETAL: RUE chronic lymphedema  PSYCH: A+O to person, place, and time; affect appropriate  NEUROLOGY: CN 2-12 are intact and symmetric; no gross sensory deficits   SKIN: chronic venous stasis changes    LABS: reviewed                                           10.5   9.45  )-----------( 399      ( 24 Mar 2023 12:37 )             35.1       03-25    149<H>  |  89<L>  |  21  ----------------------------<  235<H>  4.7   |  38<H>  |  1.26    Ca    9.3      25 Mar 2023 12:03  Phos  2.7     03-25  Mg     1.9     03-25                              CAPILLARY BLOOD GLUCOSE      POCT Blood Glucose.: 185 mg/dL (25 Mar 2023 12:41)        CT Chest No Cont:   ACC: [id number] EXAM:  CT CHEST   ORDERED BY: NICKOLAS ALMANZAR     PROCEDURE DATE:  2023          INTERPRETATION:  CLINICAL INFORMATION: Hypoxic respiratory failure.   Evaluate for pneumonia.    COMPARISON: CT abdomen pelvis 2/3/2023, CT chest 2022.    CONTRAST/COMPLICATIONS:  IV Contrast: NONE  Oral Contrast: NONE  Complications: None reported at time of study completion    PROCEDURE:  CT of the Chest was performed.  Sagittal and coronal reformats were performed.    FINDINGS:    LUNGS, AIRWAYS AND PLEURA: Increased moderate pleural effusions,   previously small amount 2/3/2022 abdominal CT, with intrafissural   extension and bibasilar atelectasis. There is diffuse septal thickening   throughout both lungs with associated groundglass opacities likely due to   pulmonary edema. Mosaic lung parenchyma.    MEDIASTINUM AND SOPHY: Mediastinal nodes measuring up to 1.5 cm (3-49)   short axis, unchanged from 2022. Right axillary diana dissection.    HEART/VESSELS: Cardiomegaly. No pericardial effusion. Status post CABG.   The great vessels are normal in size.    VISUALIZED UPPER ABDOMEN: Cholelithiasis. Thin peripheral calcifications   of the spleen. Right renal cysts versus hydronephrosis, incompletely   imaged and characterized onthis study, with cortical thinning of the   right kidney.    BONES: Right mastectomy. Remote bilateral rib fractures. No aggressive   osseous lesion.    IMPRESSION:  Pulmonary edema with moderate pleural effusions, increased since 2/3/2023   abdominal CT. No pneumonia.    --- End of Report ---

## 2023-03-25 NOTE — PROGRESS NOTE ADULT - SUBJECTIVE AND OBJECTIVE BOX
NEPHROLOGY     Patient seen and examined sitting in chair with family at bedside, pt reports still feels mild cantor, on 2L NC, denies cp, in no acute distress.     MEDICATIONS  (STANDING):  albuterol/ipratropium for Nebulization 3 milliLiter(s) Nebulizer every 6 hours  apixaban 2.5 milliGRAM(s) Oral every 12 hours  aspirin  chewable 81 milliGRAM(s) Oral daily  atorvastatin 40 milliGRAM(s) Oral at bedtime  buMETAnide Injectable 1 milliGRAM(s) IV Push two times a day  cefTRIAXone   IVPB      cefTRIAXone   IVPB 1000 milliGRAM(s) IV Intermittent every 24 hours  dextrose 5%. 1000 milliLiter(s) (100 mL/Hr) IV Continuous <Continuous>  dextrose 5%. 1000 milliLiter(s) (50 mL/Hr) IV Continuous <Continuous>  dextrose 50% Injectable 25 Gram(s) IV Push once  dextrose 50% Injectable 12.5 Gram(s) IV Push once  dextrose 50% Injectable 25 Gram(s) IV Push once  gabapentin 200 milliGRAM(s) Oral at bedtime  glucagon  Injectable 1 milliGRAM(s) IntraMuscular once  insulin glargine Injectable (LANTUS) 21 Unit(s) SubCutaneous at bedtime  insulin lispro (ADMELOG) corrective regimen sliding scale   SubCutaneous three times a day before meals  insulin lispro (ADMELOG) corrective regimen sliding scale   SubCutaneous at bedtime  insulin lispro Injectable (ADMELOG) 7 Unit(s) SubCutaneous three times a day before meals  methylPREDNISolone   Oral   methylPREDNISolone 4 milliGRAM(s) Oral before breakfast  methylPREDNISolone 4 milliGRAM(s) Oral after lunch  methylPREDNISolone 4 milliGRAM(s) Oral after dinner  methylPREDNISolone 8 milliGRAM(s) Oral at bedtime  metoprolol tartrate 100 milliGRAM(s) Oral every 12 hours  pantoprazole    Tablet 40 milliGRAM(s) Oral every 12 hours    VITALS:  T(C): , Max: 36.5 (03-24-23 @ 20:41)  T(F): , Max: 97.7 (03-24-23 @ 20:41)  HR: 68 (03-25-23 @ 14:37)  BP: 142/68 (03-25-23 @ 14:37)  RR: 18 (03-25-23 @ 14:37)  SpO2: 95% (03-25-23 @ 14:37)    I and O's:    03-24 @ 07:01  -  03-25 @ 07:00  --------------------------------------------------------  IN: 0 mL / OUT: 800 mL / NET: -800 mL    PHYSICAL EXAM:  Constitutional: NAD  Respiratory: diminished at bases   Cardiovascular: S1 and S2, RRR  Gastrointestinal: + BS, soft, NT, ND  Extremities:  + le edema   Neurological: AAO x 3, CN 2-12 intact  : No Dowling    LABS:                        10.5   9.45  )-----------( 399      ( 24 Mar 2023 12:37 )             35.1     03-25    149<H>  |  89<L>  |  21  ----------------------------<  235<H>  4.7   |  38<H>  |  1.26    Ca    9.3      25 Mar 2023 12:03  Phos  2.7     03-25  Mg     1.9     03-25    RADIOLOGY & ADDITIONAL STUDIES:    ACC: [id number] EXAM:  CT CHEST   ORDERED BY: NICKOLAS ALMANZAR     PROCEDURE DATE:  03/24/2023        INTERPRETATION:  CLINICAL INFORMATION: Hypoxic respiratory failure.   Evaluate for pneumonia.    COMPARISON: CT abdomen pelvis 2/3/2023, CT chest 9/7/2022.    CONTRAST/COMPLICATIONS:  IV Contrast: NONE  Oral Contrast: NONE  Complications: None reported at time of study completion    PROCEDURE:  CT of the Chest was performed.  Sagittal and coronal reformats were performed.    FINDINGS:    LUNGS, AIRWAYS AND PLEURA: Increased moderate pleural effusions,   previously small amount 2/3/2022 abdominal CT, with intrafissural   extension and bibasilar atelectasis. There is diffuse septal thickening   throughout both lungs with associated groundglass opacities likely due to   pulmonary edema. Mosaic lung parenchyma.    MEDIASTINUM AND SOPHY: Mediastinal nodes measuring up to 1.5 cm (3-49)   short axis, unchanged from 9/7/2022. Right axillary diana dissection.    HEART/VESSELS: Cardiomegaly. No pericardial effusion. Status post CABG.   The great vessels are normal in size.    VISUALIZED UPPER ABDOMEN: Cholelithiasis. Thin peripheral calcifications   of the spleen. Right renal cysts versus hydronephrosis, incompletely   imaged and characterized onthis study, with cortical thinning of the   right kidney.    BONES: Right mastectomy. Remote bilateral rib fractures. No aggressive   osseous lesion.    IMPRESSION:  Pulmonary edema with moderate pleural effusions, increased since 2/3/2023   abdominal CT. No pneumonia.    --- End of Report ---     BRIJESH ARGUELLES MD; Resident Radiologist  This document has been electronically signed.  NAHUM FRY M.D., Attending Radiologist  This document has been electronically signed. Mar 24 2023  5:03PM

## 2023-03-25 NOTE — PROGRESS NOTE ADULT - SUBJECTIVE AND OBJECTIVE BOX
DATE OF SERVICE: 03-25-23 @ 22:23    Patient is a 83y old  Female who presents with a chief complaint of shortness of breath (25 Mar 2023 15:11)      INTERVAL HISTORY: feels ok, still SOB    TELEMETRY Personally reviewed:  	  MEDICATIONS:  buMETAnide Infusion 1 mG/Hr IV Continuous <Continuous>  metoprolol tartrate 100 milliGRAM(s) Oral every 12 hours        PHYSICAL EXAM:  T(C): 36.8 (03-25-23 @ 20:47), Max: 36.8 (03-25-23 @ 17:51)  HR: 66 (03-25-23 @ 20:47) (64 - 83)  BP: 130/74 (03-25-23 @ 20:47) (124/82 - 142/68)  RR: 18 (03-25-23 @ 20:47) (18 - 18)  SpO2: 96% (03-25-23 @ 20:47) (95% - 96%)  Wt(kg): --  I&O's Summary    24 Mar 2023 07:01  -  25 Mar 2023 07:00  --------------------------------------------------------  IN: 0 mL / OUT: 800 mL / NET: -800 mL          Appearance: In no distress	  HEENT:    PERRL, EOMI	  Cardiovascular:  S1 S2, No JVD  Respiratory: Lungs clear to auscultation	  Gastrointestinal:  Soft, Non-tender, + BS	  Vascularature:  No edema of LE  Psychiatric: Appropriate affect   Neuro: no acute focal deficits                               10.5   9.45  )-----------( 399      ( 24 Mar 2023 12:37 )             35.1     03-25    149<H>  |  89<L>  |  21  ----------------------------<  235<H>  4.7   |  38<H>  |  1.26    Ca    9.3      25 Mar 2023 12:03  Phos  2.7     03-25  Mg     1.9     03-25          Labs personally reviewed      ASSESSMENT/PLAN: 	      83F w/ PMHx HTN, HLD, DM2, HFpEF (EF 65%), CAD s/p CABG, Breast CA s/p R partial mastectomy, rectal CA s/p resection with recent admission for acute rissa s/p ERCP w/ stent c/b gallbladder perf w/ perc rissa and recurrent SVT presenting for 1 week of shortness of breath and fluid retention. Patient has been at [facility] since her discharge from the hospital on 2/7/23. About a week ago she woke up in the middle of the night short of breath which continued to persist. She additionally complaints of epigastric burning sensation and early satiety. At [facility] she was placed on oxygen with minimal improvement in respiratory status, also received additional lasix 40 and metolazone today prior to coming to the ED. Denies CP, palpitations or syncope.    Problem/Plan -1  Problem: Acute on Chronic Diastolic CHF  - Patient with severe pulm pressures on echo  - CXR with pulm edema  - proBNP 3205  - Repeat TTE shows preserved EF, hyperdynamic LV function, basal inferoseptum hypokinesis, decreased RV systolic functin, severe pulm pressures, B/L pleural effusions  - Agree with Dr Khan to escalate diuretics to Bumex gtt as pt still decompensated with pulm edema   - Strict I &Os, daily weights    Problem/Plan -2  Problem: CAD   - s/p CABG 2020  - ECG with no ischemia noted  - Trop 21  - c/w ASA, metorpolol and statin  - TTE shows basal inferoseptum hypokinesis  - c/o chronic epigastric burning most likely noncardiac in nature given patient's recent cholecystitis     Problem/Plan -3  Problem: hx of Recurrent SVT  - c/w Metoprolol 100mg PO BID  - cont to monitor on tele    Problem/Plan -4  Problem: Atrial Fibrillation  - Patient with n/o A-flutter on previous admission  - c/w Metoprolol   - c/w eliquis 2.5mg PO BID           Sergey Winchester DO [facility]  Cardiovascular Medicine  [address] [place] [address], [address]  Office: [phone]  Available via Text/call on Microsoft Teams

## 2023-03-25 NOTE — PROGRESS NOTE ADULT - ASSESSMENT
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    D/w Dr. Hood Mike, [facility]  [phone]

## 2023-03-26 LAB
-  AMIKACIN: SIGNIFICANT CHANGE UP
-  AMOXICILLIN/CLAVULANIC ACID: SIGNIFICANT CHANGE UP
-  AMPICILLIN/SULBACTAM: SIGNIFICANT CHANGE UP
-  AMPICILLIN: SIGNIFICANT CHANGE UP
-  AZTREONAM: SIGNIFICANT CHANGE UP
-  CEFAZOLIN: SIGNIFICANT CHANGE UP
-  CEFEPIME: SIGNIFICANT CHANGE UP
-  CEFOXITIN: SIGNIFICANT CHANGE UP
-  CEFTRIAXONE: SIGNIFICANT CHANGE UP
-  CEFUROXIME: SIGNIFICANT CHANGE UP
-  CIPROFLOXACIN: SIGNIFICANT CHANGE UP
-  ERTAPENEM: SIGNIFICANT CHANGE UP
-  GENTAMICIN: SIGNIFICANT CHANGE UP
-  IMIPENEM: SIGNIFICANT CHANGE UP
-  LEVOFLOXACIN: SIGNIFICANT CHANGE UP
-  MEROPENEM: SIGNIFICANT CHANGE UP
-  NITROFURANTOIN: SIGNIFICANT CHANGE UP
-  PIPERACILLIN/TAZOBACTAM: SIGNIFICANT CHANGE UP
-  TOBRAMYCIN: SIGNIFICANT CHANGE UP
-  TRIMETHOPRIM/SULFAMETHOXAZOLE: SIGNIFICANT CHANGE UP
ANION GAP SERPL CALC-SCNC: 13 MMOL/L — SIGNIFICANT CHANGE UP (ref 5–17)
ANION GAP SERPL CALC-SCNC: 8 MMOL/L — SIGNIFICANT CHANGE UP (ref 5–17)
ANION GAP SERPL CALC-SCNC: 9 MMOL/L — SIGNIFICANT CHANGE UP (ref 5–17)
BUN SERPL-MCNC: 26 MG/DL — HIGH (ref 7–23)
BUN SERPL-MCNC: 28 MG/DL — HIGH (ref 7–23)
BUN SERPL-MCNC: 33 MG/DL — HIGH (ref 7–23)
CALCIUM SERPL-MCNC: 9 MG/DL — SIGNIFICANT CHANGE UP (ref 8.4–10.5)
CALCIUM SERPL-MCNC: 9.2 MG/DL — SIGNIFICANT CHANGE UP (ref 8.4–10.5)
CALCIUM SERPL-MCNC: 9.2 MG/DL — SIGNIFICANT CHANGE UP (ref 8.4–10.5)
CHLORIDE SERPL-SCNC: 89 MMOL/L — LOW (ref 96–108)
CHLORIDE SERPL-SCNC: 90 MMOL/L — LOW (ref 96–108)
CHLORIDE SERPL-SCNC: 92 MMOL/L — LOW (ref 96–108)
CO2 SERPL-SCNC: 38 MMOL/L — HIGH (ref 22–31)
CO2 SERPL-SCNC: 39 MMOL/L — HIGH (ref 22–31)
CO2 SERPL-SCNC: 44 MMOL/L — HIGH (ref 22–31)
CREAT SERPL-MCNC: 1.41 MG/DL — HIGH (ref 0.5–1.3)
CREAT SERPL-MCNC: 1.42 MG/DL — HIGH (ref 0.5–1.3)
CREAT SERPL-MCNC: 1.44 MG/DL — HIGH (ref 0.5–1.3)
CULTURE RESULTS: SIGNIFICANT CHANGE UP
EGFR: 36 ML/MIN/1.73M2 — LOW
EGFR: 36 ML/MIN/1.73M2 — LOW
EGFR: 37 ML/MIN/1.73M2 — LOW
GLUCOSE BLDC GLUCOMTR-MCNC: 207 MG/DL — HIGH (ref 70–99)
GLUCOSE BLDC GLUCOMTR-MCNC: 224 MG/DL — HIGH (ref 70–99)
GLUCOSE BLDC GLUCOMTR-MCNC: 226 MG/DL — HIGH (ref 70–99)
GLUCOSE BLDC GLUCOMTR-MCNC: 240 MG/DL — HIGH (ref 70–99)
GLUCOSE SERPL-MCNC: 243 MG/DL — HIGH (ref 70–99)
GLUCOSE SERPL-MCNC: 246 MG/DL — HIGH (ref 70–99)
GLUCOSE SERPL-MCNC: 248 MG/DL — HIGH (ref 70–99)
MAGNESIUM SERPL-MCNC: 1.7 MG/DL — SIGNIFICANT CHANGE UP (ref 1.6–2.6)
MAGNESIUM SERPL-MCNC: 1.8 MG/DL — SIGNIFICANT CHANGE UP (ref 1.6–2.6)
METHOD TYPE: SIGNIFICANT CHANGE UP
ORGANISM # SPEC MICROSCOPIC CNT: SIGNIFICANT CHANGE UP
ORGANISM # SPEC MICROSCOPIC CNT: SIGNIFICANT CHANGE UP
PHOSPHATE SERPL-MCNC: 2.8 MG/DL — SIGNIFICANT CHANGE UP (ref 2.5–4.5)
PHOSPHATE SERPL-MCNC: 3.4 MG/DL — SIGNIFICANT CHANGE UP (ref 2.5–4.5)
POTASSIUM SERPL-MCNC: 4.2 MMOL/L — SIGNIFICANT CHANGE UP (ref 3.5–5.3)
POTASSIUM SERPL-MCNC: 4.2 MMOL/L — SIGNIFICANT CHANGE UP (ref 3.5–5.3)
POTASSIUM SERPL-MCNC: 4.3 MMOL/L — SIGNIFICANT CHANGE UP (ref 3.5–5.3)
POTASSIUM SERPL-SCNC: 4.2 MMOL/L — SIGNIFICANT CHANGE UP (ref 3.5–5.3)
POTASSIUM SERPL-SCNC: 4.2 MMOL/L — SIGNIFICANT CHANGE UP (ref 3.5–5.3)
POTASSIUM SERPL-SCNC: 4.3 MMOL/L — SIGNIFICANT CHANGE UP (ref 3.5–5.3)
SODIUM SERPL-SCNC: 140 MMOL/L — SIGNIFICANT CHANGE UP (ref 135–145)
SODIUM SERPL-SCNC: 141 MMOL/L — SIGNIFICANT CHANGE UP (ref 135–145)
SODIUM SERPL-SCNC: 141 MMOL/L — SIGNIFICANT CHANGE UP (ref 135–145)
SPECIMEN SOURCE: SIGNIFICANT CHANGE UP

## 2023-03-26 PROCEDURE — 99233 SBSQ HOSP IP/OBS HIGH 50: CPT

## 2023-03-26 RX ADMIN — Medication 100 MILLIGRAM(S): at 18:10

## 2023-03-26 RX ADMIN — ATORVASTATIN CALCIUM 40 MILLIGRAM(S): 80 TABLET, FILM COATED ORAL at 21:46

## 2023-03-26 RX ADMIN — Medication 7 UNIT(S): at 18:11

## 2023-03-26 RX ADMIN — INSULIN GLARGINE 21 UNIT(S): 100 INJECTION, SOLUTION SUBCUTANEOUS at 21:46

## 2023-03-26 RX ADMIN — Medication 2: at 12:47

## 2023-03-26 RX ADMIN — PANTOPRAZOLE SODIUM 40 MILLIGRAM(S): 20 TABLET, DELAYED RELEASE ORAL at 05:20

## 2023-03-26 RX ADMIN — GABAPENTIN 200 MILLIGRAM(S): 400 CAPSULE ORAL at 21:46

## 2023-03-26 RX ADMIN — Medication 4 MILLIGRAM(S): at 18:11

## 2023-03-26 RX ADMIN — BUMETANIDE 5 MG/HR: 0.25 INJECTION INTRAMUSCULAR; INTRAVENOUS at 21:47

## 2023-03-26 RX ADMIN — Medication 81 MILLIGRAM(S): at 11:15

## 2023-03-26 RX ADMIN — Medication 3 MILLILITER(S): at 05:21

## 2023-03-26 RX ADMIN — Medication 2: at 09:23

## 2023-03-26 RX ADMIN — BUMETANIDE 5 MG/HR: 0.25 INJECTION INTRAMUSCULAR; INTRAVENOUS at 08:00

## 2023-03-26 RX ADMIN — Medication 2: at 18:11

## 2023-03-26 RX ADMIN — Medication 100 MILLIGRAM(S): at 05:20

## 2023-03-26 RX ADMIN — Medication 7 UNIT(S): at 09:24

## 2023-03-26 RX ADMIN — Medication 3 MILLILITER(S): at 11:15

## 2023-03-26 RX ADMIN — Medication 3 MILLILITER(S): at 00:31

## 2023-03-26 RX ADMIN — Medication 4 MILLIGRAM(S): at 12:47

## 2023-03-26 RX ADMIN — APIXABAN 2.5 MILLIGRAM(S): 2.5 TABLET, FILM COATED ORAL at 18:10

## 2023-03-26 RX ADMIN — Medication 4 MILLIGRAM(S): at 21:46

## 2023-03-26 RX ADMIN — Medication 4 MILLIGRAM(S): at 06:28

## 2023-03-26 RX ADMIN — PANTOPRAZOLE SODIUM 40 MILLIGRAM(S): 20 TABLET, DELAYED RELEASE ORAL at 18:10

## 2023-03-26 RX ADMIN — Medication 7 UNIT(S): at 12:47

## 2023-03-26 RX ADMIN — APIXABAN 2.5 MILLIGRAM(S): 2.5 TABLET, FILM COATED ORAL at 05:19

## 2023-03-26 RX ADMIN — Medication 3 MILLILITER(S): at 18:10

## 2023-03-26 NOTE — PROGRESS NOTE ADULT - SUBJECTIVE AND OBJECTIVE BOX
DATE OF SERVICE: 03-26-23 @ 09:28    Patient is a 83y old  Female who presents with a chief complaint of shortness of breath (25 Mar 2023 22:23)      INTERVAL HISTORY: no complaints     REVIEW OF SYSTEMS:  CONSTITUTIONAL: No weakness  EYES/ENT: No visual changes;  No throat pain   NECK: No pain or stiffness  RESPIRATORY: No cough, wheezing; No shortness of breath  CARDIOVASCULAR: No chest pain or palpitations  GASTROINTESTINAL: No abdominal  pain. No nausea, vomiting, or hematemesis  GENITOURINARY: No dysuria, frequency or hematuria  NEUROLOGICAL: No stroke like symptoms  SKIN: No rashes  	  MEDICATIONS:  buMETAnide Infusion 1 mG/Hr IV Continuous <Continuous>  metoprolol tartrate 100 milliGRAM(s) Oral every 12 hours        PHYSICAL EXAM:  T(C): 36.6 (03-26-23 @ 04:37), Max: 36.8 (03-25-23 @ 17:51)  HR: 74 (03-26-23 @ 04:37) (66 - 83)  BP: 134/75 (03-26-23 @ 04:37) (124/82 - 142/68)  RR: 18 (03-26-23 @ 07:00) (18 - 18)  SpO2: 100% (03-26-23 @ 07:00) (95% - 100%)  Wt(kg): --  I&O's Summary    25 Mar 2023 07:01  -  26 Mar 2023 07:00  --------------------------------------------------------  IN: 0 mL / OUT: 1900 mL / NET: -1900 mL    26 Mar 2023 07:01  -  26 Mar 2023 09:28  --------------------------------------------------------  IN: 120 mL / OUT: 300 mL / NET: -180 mL          Appearance: In no distress	  HEENT:    PERRL, EOMI	  Cardiovascular:  S1 S2, No JVD  Respiratory: Lungs clear to auscultation	  Gastrointestinal:  Soft, Non-tender, + BS	  Vascularature:  No edema of LE  Psychiatric: Appropriate affect   Neuro: no acute focal deficits                               10.5   9.45  )-----------( 399      ( 24 Mar 2023 12:37 )             35.1     03-26    141  |  90<L>  |  28<H>  ----------------------------<  248<H>  4.2   |  38<H>  |  1.42<H>    Ca    9.2      26 Mar 2023 06:49  Phos  3.4     03-26  Mg     1.8     03-26          Labs personally reviewed      ASSESSMENT/PLAN: 	      83F w/ PMHx HTN, HLD, DM2, HFpEF (EF 65%), CAD s/p CABG, Breast CA s/p R partial mastectomy, rectal CA s/p resection with recent admission for acute rissa s/p ERCP w/ stent c/b gallbladder perf w/ perc rissa and recurrent SVT presenting for 1 week of shortness of breath and fluid retention. Patient has been at [facility] since her discharge from the hospital on 2/7/23. About a week ago she woke up in the middle of the night short of breath which continued to persist. She additionally complaints of epigastric burning sensation and early satiety. At [facility] she was placed on oxygen with minimal improvement in respiratory status, also received additional lasix 40 and metolazone today prior to coming to the ED. Denies CP, palpitations or syncope.    Problem/Plan -1  Problem: Acute on Chronic Diastolic CHF  - Patient with severe pulm pressures on echo  - CXR with pulm edema  - proBNP 3205  - Repeat TTE shows preserved EF, hyperdynamic LV function, basal inferoseptum hypokinesis, decreased RV systolic functin, severe pulm pressures, B/L pleural effusions  - Agree with Dr Khan to escalate diuretics to Bumex gtt as pt still decompensated with pulm edema   - Strict I &Os, daily weights  - C/w IV Bumex 1mg/hr    Problem/Plan -2  Problem: CAD   - s/p CABG 2020  - ECG with no ischemia noted  - Trop 21  - c/w ASA, metorpolol and statin  - TTE shows basal inferoseptum hypokinesis  - c/o chronic epigastric burning most likely noncardiac in nature given patient's recent cholecystitis     Problem/Plan -3  Problem: hx of Recurrent SVT  - c/w Metoprolol 100mg PO BID  - cont to monitor on tele    Problem/Plan -4  Problem: Atrial Fibrillation  - Patient with n/o A-flutter on previous admission  - c/w Metoprolol   - c/w eliquis 2.5mg PO BID               MAINE Mancia DO [facility]  Cardiovascular Medicine  [address] [place] [address], [address]  Office: [phone]  Available via call/text on [employer] Teams  DATE OF SERVICE: 03-26-23 @ 09:28    Patient is a 83y old  Female who presents with a chief complaint of shortness of breath (25 Mar 2023 22:23)      INTERVAL HISTORY: no complaints     REVIEW OF SYSTEMS:  CONSTITUTIONAL: No weakness  EYES/ENT: No visual changes;  No throat pain   NECK: No pain or stiffness  RESPIRATORY: No cough, wheezing; No shortness of breath  CARDIOVASCULAR: No chest pain or palpitations  GASTROINTESTINAL: No abdominal  pain. No nausea, vomiting, or hematemesis  GENITOURINARY: No dysuria, frequency or hematuria  NEUROLOGICAL: No stroke like symptoms  SKIN: No rashes  	  MEDICATIONS:  buMETAnide Infusion 1 mG/Hr IV Continuous <Continuous>  metoprolol tartrate 100 milliGRAM(s) Oral every 12 hours        PHYSICAL EXAM:  T(C): 36.6 (03-26-23 @ 04:37), Max: 36.8 (03-25-23 @ 17:51)  HR: 74 (03-26-23 @ 04:37) (66 - 83)  BP: 134/75 (03-26-23 @ 04:37) (124/82 - 142/68)  RR: 18 (03-26-23 @ 07:00) (18 - 18)  SpO2: 100% (03-26-23 @ 07:00) (95% - 100%)  Wt(kg): --  I&O's Summary    25 Mar 2023 07:01  -  26 Mar 2023 07:00  --------------------------------------------------------  IN: 0 mL / OUT: 1900 mL / NET: -1900 mL    26 Mar 2023 07:01  -  26 Mar 2023 09:28  --------------------------------------------------------  IN: 120 mL / OUT: 300 mL / NET: -180 mL          Appearance: In no distress	  HEENT:    PERRL, EOMI	  Cardiovascular:  S1 S2, No JVD  Respiratory: Lungs clear to auscultation	  Gastrointestinal:  Soft, Non-tender, + BS	  Vascularature:  No edema of LE  Psychiatric: Appropriate affect   Neuro: no acute focal deficits                               10.5   9.45  )-----------( 399      ( 24 Mar 2023 12:37 )             35.1     03-26    141  |  90<L>  |  28<H>  ----------------------------<  248<H>  4.2   |  38<H>  |  1.42<H>    Ca    9.2      26 Mar 2023 06:49  Phos  3.4     03-26  Mg     1.8     03-26          Labs personally reviewed      ASSESSMENT/PLAN: 	      83F w/ PMHx HTN, HLD, DM2, HFpEF (EF 65%), CAD s/p CABG, Breast CA s/p R partial mastectomy, rectal CA s/p resection with recent admission for acute rissa s/p ERCP w/ stent c/b gallbladder perf w/ perc rissa and recurrent SVT presenting for 1 week of shortness of breath and fluid retention. Patient has been at [facility] since her discharge from the hospital on 2/7/23. About a week ago she woke up in the middle of the night short of breath which continued to persist. She additionally complaints of epigastric burning sensation and early satiety. At [facility] she was placed on oxygen with minimal improvement in respiratory status, also received additional lasix 40 and metolazone today prior to coming to the ED. Denies CP, palpitations or syncope.    Problem/Plan -1  Problem: Acute on Chronic Diastolic CHF  - Patient with severe pulm pressures on echo  - CXR and CT chest with pulm edema  - proBNP 3205  - Repeat TTE shows preserved EF, hyperdynamic LV function, basal inferoseptum hypokinesis, decreased RV systolic function severe pulm pressures, B/L pleural effusions  - Strict I &Os, daily weights  - C/w IV Bumex 1mg/hr    Problem/Plan -2  Problem: CAD   - s/p CABG 2020  - ECG with no ischemia noted  - Trop 21  - c/w ASA, metorpolol and statin  - TTE shows basal inferoseptum hypokinesis  - c/o chronic epigastric burning most likely noncardiac in nature given patient's recent cholecystitis     Problem/Plan -3  Problem: hx of Recurrent SVT  - c/w Metoprolol 100mg PO BID  - cont to monitor on tele    Problem/Plan -4  Problem: Atrial Fibrillation  - Patient with n/o A-flutter on previous admission  - c/w Metoprolol   - c/w eliquis 2.5mg PO BID               MAINE Mancia DO [facility]  Cardiovascular Medicine  [address] [place] [address], [address]  Office: [phone]  Available via call/text on [employer] Teams

## 2023-03-26 NOTE — PROGRESS NOTE ADULT - PROBLEM SELECTOR PLAN 1
TTE as above with EF 75%, b/l pleural effusions, BNP elevated   -S/p IV bumex, switched to bumex gtt 3/25 due to persistent edema on imaging and lack of symptomatic improvement  -Now with improved diuresis and 4lb weight loss  -Strict Is/Os, daily weights, 1L fluid restriction  -cardiology recs appreciated  -Monitor BMP BID and replete

## 2023-03-26 NOTE — PROGRESS NOTE ADULT - ASSESSMENT
on nasal cannula- attests to improving sob  afebrile  no complaints    albuterol/ipratropium for Nebulization 3 milliLiter(s) Nebulizer every 6 hours  apixaban 2.5 milliGRAM(s) Oral every 12 hours  aspirin  chewable 81 milliGRAM(s) Oral daily  atorvastatin 40 milliGRAM(s) Oral at bedtime  buMETAnide Infusion 1 mG/Hr IV Continuous <Continuous>  dextrose 5%. 1000 milliLiter(s) IV Continuous <Continuous>  dextrose 5%. 1000 milliLiter(s) IV Continuous <Continuous>  dextrose 50% Injectable 25 Gram(s) IV Push once  dextrose 50% Injectable 12.5 Gram(s) IV Push once  dextrose 50% Injectable 25 Gram(s) IV Push once  dextrose Oral Gel 15 Gram(s) Oral once PRN  gabapentin 200 milliGRAM(s) Oral at bedtime  glucagon  Injectable 1 milliGRAM(s) IntraMuscular once  insulin glargine Injectable (LANTUS) 21 Unit(s) SubCutaneous at bedtime  insulin lispro (ADMELOG) corrective regimen sliding scale   SubCutaneous three times a day before meals  insulin lispro (ADMELOG) corrective regimen sliding scale   SubCutaneous at bedtime  insulin lispro Injectable (ADMELOG) 7 Unit(s) SubCutaneous three times a day before meals  methylPREDNISolone   Oral   methylPREDNISolone 4 milliGRAM(s) Oral before breakfast  methylPREDNISolone 4 milliGRAM(s) Oral after lunch  methylPREDNISolone 4 milliGRAM(s) Oral after dinner  methylPREDNISolone 4 milliGRAM(s) Oral at bedtime  metoprolol tartrate 100 milliGRAM(s) Oral every 12 hours  pantoprazole    Tablet 40 milliGRAM(s) Oral every 12 hours      VITAL:  T(C): , Max: 36.8 (03-25-23 @ 17:51)  T(F): , Max: 98.2 (03-25-23 @ 17:51)  HR: 73 (03-26-23 @ 12:20)  BP: 106/68 (03-26-23 @ 12:20)  BP(mean): --  RR: 18 (03-26-23 @ 12:20)  SpO2: 97% (03-26-23 @ 12:20)  Wt(kg): --    03-25-23 @ 07:01  -  03-26-23 @ 07:00  --------------------------------------------------------  IN: 0 mL / OUT: 1900 mL / NET: -1900 mL    03-26-23 @ 07:01  -  03-26-23 @ 15:27  --------------------------------------------------------  IN: 460 mL / OUT: 600 mL / NET: -140 mL        PHYSICAL EXAM:  Constitutional: NAD  Respiratory: diminished at bases   Cardiovascular: S1 and S2, RRR  Gastrointestinal: + BS, soft, NT, ND  Extremities:  + le edema   Neurological: AAO x 3, CN 2-12 intact  : No Dow[place]      LABS:      Na(141)/K(4.2)/Cl(90)/HCO3(38)/BUN(28)/Cr(1.42)Glu(248)/Ca(9.2)/Mg(--)/PO4(--)    03-26 @ 06:49  Na(140)/K(4.3)/Cl(92)/HCO3(39)/BUN(26)/Cr(1.44)Glu(246)/Ca(9.0)/Mg(1.8)/PO4(3.4)    03-26 @ 03:55  Na(149)/K(4.7)/Cl(89)/HCO3(38)/BUN(21)/Cr(1.26)Glu(235)/Ca(9.3)/Mg(1.9)/PO4(2.7)    03-25 @ 12:03  Na(141)/K(3.8)/Cl(97)/HCO3(35)/BUN(19)/Cr(1.28)Glu(176)/Ca(8.8)/Mg(1.9)/PO4(3.3)    03-24 @ 12:37  Na(141)/K(3.6)/Cl(96)/HCO3(36)/BUN(21)/Cr(1.30)Glu(124)/Ca(8.9)/Mg(1.7)/PO4(2.5)    03-23 @ 17:36            ASSESSMENT/PLAN  ASSESSMENT:    Patient is a 80 year old F with a PMHx of HTN, HLD, T2DM, HFpEF (EF 65% X/2022), breast cancer s/p R partial mastectomy, rectal carcinoma s/p resection, CAD s/p CABG (11/2020) recent ERCP , with cholecystotomy tube placement  who presents with sob  home bumex 1 mg PO     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

## 2023-03-26 NOTE — PROGRESS NOTE ADULT - SUBJECTIVE AND OBJECTIVE BOX
[facility] Division of Hospital Medicine  Kati Khan MD  Pager (M-F, 6X-6Z): [phone]  Other Times:  [phone]    Patient is a 83y old  Female who presents with a chief complaint of shortness of breath (23 Mar 2023 18:47)      SUBJECTIVE / OVERNIGHT EVENTS: No acute events. Dyspnea improved. On bumex gtt.    ADDITIONAL REVIEW OF SYSTEMS: Negative    MEDICATIONS  (STANDING):  albuterol/ipratropium for Nebulization 3 milliLiter(s) Nebulizer every 6 hours  apixaban 2.5 milliGRAM(s) Oral every 12 hours  aspirin  chewable 81 milliGRAM(s) Oral daily  atorvastatin 40 milliGRAM(s) Oral at bedtime  buMETAnide Infusion 1 mG/Hr (5 mL/Hr) IV Continuous <Continuous>  dextrose 5%. 1000 milliLiter(s) (50 mL/Hr) IV Continuous <Continuous>  dextrose 5%. 1000 milliLiter(s) (100 mL/Hr) IV Continuous <Continuous>  dextrose 50% Injectable 25 Gram(s) IV Push once  dextrose 50% Injectable 12.5 Gram(s) IV Push once  dextrose 50% Injectable 25 Gram(s) IV Push once  gabapentin 200 milliGRAM(s) Oral at bedtime  glucagon  Injectable 1 milliGRAM(s) IntraMuscular once  insulin glargine Injectable (LANTUS) 21 Unit(s) SubCutaneous at bedtime  insulin lispro (ADMELOG) corrective regimen sliding scale   SubCutaneous three times a day before meals  insulin lispro (ADMELOG) corrective regimen sliding scale   SubCutaneous at bedtime  insulin lispro Injectable (ADMELOG) 7 Unit(s) SubCutaneous three times a day before meals  methylPREDNISolone   Oral   methylPREDNISolone 4 milliGRAM(s) Oral before breakfast  methylPREDNISolone 4 milliGRAM(s) Oral after lunch  methylPREDNISolone 4 milliGRAM(s) Oral after dinner  methylPREDNISolone 4 milliGRAM(s) Oral at bedtime  metoprolol tartrate 100 milliGRAM(s) Oral every 12 hours  pantoprazole    Tablet 40 milliGRAM(s) Oral every 12 hours    MEDICATIONS  (PRN):  dextrose Oral Gel 15 Gram(s) Oral once PRN Blood Glucose LESS THAN 70 milliGRAM(s)/deciliter    I&O's Summary    25 Mar 2023 07:01  -  26 Mar 2023 07:00  --------------------------------------------------------  IN: 0 mL / OUT: 1900 mL / NET: -1900 mL    26 Mar 2023 07:01  -  26 Mar 2023 15:13  --------------------------------------------------------  IN: 460 mL / OUT: 600 mL / NET: -140 mL      PHYSICAL EXAM:  T(C): 36.3 (23 @ 14:37), Max: 36.5 (23 @ 20:41)  T(F): 97.3 (23 @ 14:37), Max: 97.7 (23 @ 20:41)  HR: 68 (23 @ 14:37) (64 - 76)  BP: 142/68 (23 @ 14:37) (137/65 - 178/81)  RR: 18 (23 @ 14:37) (18 - 18)  SpO2: 95% (23 @ 14:37) (94% - 96%)  Wt(kg): --  Daily Weight in k.6 (23 Mar 2023 09:18)  Daily Weight in k (26 Mar 2023 07:00)    CONSTITUTIONAL: well-appearing elderly woman  EYES: PERRLA; conjunctiva and sclera clear  ENMT: Moist oral mucosa, no pharyngeal injection or exudates  NECK: Supple, no palpable masses; no thyromegaly  RESPIRATORY: Tight wheezes on exam but slightly improved air entry, increased WOB improved  CARDIOVASCULAR: Regular rate and rhythm, normal S1 and S2, no murmur/rub/gallop; improved lower extremity edema  ABDOMEN: Nontender to palpation, normoactive bowel sounds, no rebound/guarding  MUSCULOSKELETAL: RUE chronic lymphedema  PSYCH: A+O to person, place, and time; affect appropriate  NEUROLOGY: CN 2-12 are intact and symmetric; no gross sensory deficits   SKIN: chronic venous stasis changes    LABS: reviewed                                            141  |  90<L>  |  28<H>  ----------------------------<  248<H>  4.2   |  38<H>  |  1.42<H>    Ca    9.2      26 Mar 2023 06:49  Phos  3.4       Mg     1.8                                   CAPILLARY BLOOD GLUCOSE      POCT Blood Glucose.: 240 mg/dL (26 Mar 2023 12:39)

## 2023-03-27 DIAGNOSIS — J96.01 ACUTE RESPIRATORY FAILURE WITH HYPOXIA: ICD-10-CM

## 2023-03-27 LAB
-  AMIKACIN: SIGNIFICANT CHANGE UP
-  AMOXICILLIN/CLAVULANIC ACID: SIGNIFICANT CHANGE UP
-  AMPICILLIN/SULBACTAM: SIGNIFICANT CHANGE UP
-  AMPICILLIN: SIGNIFICANT CHANGE UP
-  AZTREONAM: SIGNIFICANT CHANGE UP
-  CEFAZOLIN: SIGNIFICANT CHANGE UP
-  CEFEPIME: SIGNIFICANT CHANGE UP
-  CEFOXITIN: SIGNIFICANT CHANGE UP
-  CEFTRIAXONE: SIGNIFICANT CHANGE UP
-  CEFUROXIME: SIGNIFICANT CHANGE UP
-  CIPROFLOXACIN: SIGNIFICANT CHANGE UP
-  ERTAPENEM: SIGNIFICANT CHANGE UP
-  GENTAMICIN: SIGNIFICANT CHANGE UP
-  IMIPENEM: SIGNIFICANT CHANGE UP
-  LEVOFLOXACIN: SIGNIFICANT CHANGE UP
-  MEROPENEM: SIGNIFICANT CHANGE UP
-  NITROFURANTOIN: SIGNIFICANT CHANGE UP
-  PIPERACILLIN/TAZOBACTAM: SIGNIFICANT CHANGE UP
-  TOBRAMYCIN: SIGNIFICANT CHANGE UP
-  TRIMETHOPRIM/SULFAMETHOXAZOLE: SIGNIFICANT CHANGE UP
ANION GAP SERPL CALC-SCNC: 14 MMOL/L — SIGNIFICANT CHANGE UP (ref 5–17)
ANION GAP SERPL CALC-SCNC: 15 MMOL/L — SIGNIFICANT CHANGE UP (ref 5–17)
BASE EXCESS BLDA CALC-SCNC: 22.6 MMOL/L — HIGH (ref -2–3)
BUN SERPL-MCNC: 36 MG/DL — HIGH (ref 7–23)
BUN SERPL-MCNC: 44 MG/DL — HIGH (ref 7–23)
CALCIUM SERPL-MCNC: 9.1 MG/DL — SIGNIFICANT CHANGE UP (ref 8.4–10.5)
CALCIUM SERPL-MCNC: 9.6 MG/DL — SIGNIFICANT CHANGE UP (ref 8.4–10.5)
CHLORIDE SERPL-SCNC: 84 MMOL/L — LOW (ref 96–108)
CHLORIDE SERPL-SCNC: 87 MMOL/L — LOW (ref 96–108)
CHLORIDE UR-SCNC: 91 MMOL/L — SIGNIFICANT CHANGE UP
CO2 BLDA-SCNC: 49 MMOL/L — HIGH (ref 19–24)
CO2 SERPL-SCNC: 38 MMOL/L — HIGH (ref 22–31)
CO2 SERPL-SCNC: 39 MMOL/L — HIGH (ref 22–31)
CREAT SERPL-MCNC: 1.53 MG/DL — HIGH (ref 0.5–1.3)
CREAT SERPL-MCNC: 1.89 MG/DL — HIGH (ref 0.5–1.3)
CULTURE RESULTS: SIGNIFICANT CHANGE UP
EGFR: 26 ML/MIN/1.73M2 — LOW
EGFR: 33 ML/MIN/1.73M2 — LOW
GAS PNL BLDA: SIGNIFICANT CHANGE UP
GLUCOSE BLDC GLUCOMTR-MCNC: 198 MG/DL — HIGH (ref 70–99)
GLUCOSE BLDC GLUCOMTR-MCNC: 213 MG/DL — HIGH (ref 70–99)
GLUCOSE BLDC GLUCOMTR-MCNC: 314 MG/DL — HIGH (ref 70–99)
GLUCOSE BLDC GLUCOMTR-MCNC: 329 MG/DL — HIGH (ref 70–99)
GLUCOSE SERPL-MCNC: 217 MG/DL — HIGH (ref 70–99)
GLUCOSE SERPL-MCNC: 246 MG/DL — HIGH (ref 70–99)
HCO3 BLDA-SCNC: 48 MMOL/L — CRITICAL HIGH (ref 21–28)
HOROWITZ INDEX BLDA+IHG-RTO: 32 — SIGNIFICANT CHANGE UP
METHOD TYPE: SIGNIFICANT CHANGE UP
ORGANISM # SPEC MICROSCOPIC CNT: SIGNIFICANT CHANGE UP
ORGANISM # SPEC MICROSCOPIC CNT: SIGNIFICANT CHANGE UP
PCO2 BLDA: 51 MMHG — HIGH (ref 32–45)
PH BLDA: 7.58 — HIGH (ref 7.35–7.45)
PO2 BLDA: 114 MMHG — HIGH (ref 83–108)
POTASSIUM SERPL-MCNC: 4.1 MMOL/L — SIGNIFICANT CHANGE UP (ref 3.5–5.3)
POTASSIUM SERPL-MCNC: 4.7 MMOL/L — SIGNIFICANT CHANGE UP (ref 3.5–5.3)
POTASSIUM SERPL-SCNC: 4.1 MMOL/L — SIGNIFICANT CHANGE UP (ref 3.5–5.3)
POTASSIUM SERPL-SCNC: 4.7 MMOL/L — SIGNIFICANT CHANGE UP (ref 3.5–5.3)
SAO2 % BLDA: 99 % — HIGH (ref 94–98)
SODIUM SERPL-SCNC: 137 MMOL/L — SIGNIFICANT CHANGE UP (ref 135–145)
SODIUM SERPL-SCNC: 140 MMOL/L — SIGNIFICANT CHANGE UP (ref 135–145)
SPECIMEN SOURCE: SIGNIFICANT CHANGE UP

## 2023-03-27 PROCEDURE — 99232 SBSQ HOSP IP/OBS MODERATE 35: CPT

## 2023-03-27 RX ORDER — ACETAMINOPHEN 500 MG
650 TABLET ORAL ONCE
Refills: 0 | Status: COMPLETED | OUTPATIENT
Start: 2023-03-27 | End: 2023-03-27

## 2023-03-27 RX ORDER — SODIUM CHLORIDE 9 MG/ML
1000 INJECTION, SOLUTION INTRAVENOUS
Refills: 0 | Status: DISCONTINUED | OUTPATIENT
Start: 2023-03-27 | End: 2023-03-28

## 2023-03-27 RX ADMIN — APIXABAN 2.5 MILLIGRAM(S): 2.5 TABLET, FILM COATED ORAL at 17:35

## 2023-03-27 RX ADMIN — Medication 4 MILLIGRAM(S): at 13:35

## 2023-03-27 RX ADMIN — Medication 3 MILLILITER(S): at 11:56

## 2023-03-27 RX ADMIN — SODIUM CHLORIDE 50 MILLILITER(S): 9 INJECTION, SOLUTION INTRAVENOUS at 23:12

## 2023-03-27 RX ADMIN — PANTOPRAZOLE SODIUM 40 MILLIGRAM(S): 20 TABLET, DELAYED RELEASE ORAL at 17:35

## 2023-03-27 RX ADMIN — Medication 4 MILLIGRAM(S): at 21:42

## 2023-03-27 RX ADMIN — Medication 7 UNIT(S): at 09:14

## 2023-03-27 RX ADMIN — BUMETANIDE 5 MG/HR: 0.25 INJECTION INTRAMUSCULAR; INTRAVENOUS at 07:00

## 2023-03-27 RX ADMIN — Medication 100 MILLIGRAM(S): at 17:35

## 2023-03-27 RX ADMIN — INSULIN GLARGINE 21 UNIT(S): 100 INJECTION, SOLUTION SUBCUTANEOUS at 21:58

## 2023-03-27 RX ADMIN — GABAPENTIN 200 MILLIGRAM(S): 400 CAPSULE ORAL at 21:42

## 2023-03-27 RX ADMIN — PANTOPRAZOLE SODIUM 40 MILLIGRAM(S): 20 TABLET, DELAYED RELEASE ORAL at 05:25

## 2023-03-27 RX ADMIN — Medication 4 MILLIGRAM(S): at 07:44

## 2023-03-27 RX ADMIN — Medication 2: at 09:13

## 2023-03-27 RX ADMIN — Medication 81 MILLIGRAM(S): at 11:48

## 2023-03-27 RX ADMIN — ATORVASTATIN CALCIUM 40 MILLIGRAM(S): 80 TABLET, FILM COATED ORAL at 21:41

## 2023-03-27 RX ADMIN — Medication 650 MILLIGRAM(S): at 15:21

## 2023-03-27 RX ADMIN — Medication 3 MILLILITER(S): at 05:24

## 2023-03-27 RX ADMIN — Medication 100 MILLIGRAM(S): at 05:25

## 2023-03-27 RX ADMIN — Medication 3 MILLILITER(S): at 23:12

## 2023-03-27 RX ADMIN — Medication 3 MILLILITER(S): at 00:25

## 2023-03-27 RX ADMIN — Medication 4: at 13:33

## 2023-03-27 RX ADMIN — APIXABAN 2.5 MILLIGRAM(S): 2.5 TABLET, FILM COATED ORAL at 05:25

## 2023-03-27 RX ADMIN — Medication 3 MILLILITER(S): at 17:35

## 2023-03-27 RX ADMIN — Medication 7 UNIT(S): at 13:34

## 2023-03-27 RX ADMIN — Medication 650 MILLIGRAM(S): at 16:05

## 2023-03-27 RX ADMIN — Medication 4: at 17:33

## 2023-03-27 RX ADMIN — Medication 7 UNIT(S): at 17:34

## 2023-03-27 NOTE — CHART NOTE - NSCHARTNOTEFT_GEN_A_CORE
Interval Hx: Patient with ABG resulted below:    ABG - ( 27 Mar 2023 20:53 )  pH, Arterial: 7.58  pH, Blood: x     /  pCO2: 51    /  pO2: 114   / HCO3: 48    / Base Excess: 22.6  /  SaO2: 99.0      Patient with metabolic alkalosis, likely contraction alkalosis.  Also noted to be orthostatic positive.  Above discussed with renal and cardiology.  In mutual agreement to stop Bumex gtt and start gentle hydration 1/2NS 50cc/ hr overnight.  Will continue to closely follow and assess.  Day team, attending, cards, and renal to follow.    -Kenyatta Botello PA-C, [id number], Dept of Medicine

## 2023-03-27 NOTE — PROGRESS NOTE ADULT - PROBLEM SELECTOR PLAN 2
Likely 2/2 HF exac above; however pt also noted with hypercarbic and hypoxic respiratory failure and severe pulmonary hypertension on ECHO, with decreased RVSF; pt reports significant second hand smoke exposure. CXR with Left basilar pleural effusion and/or atelectasis. RVP negative  -Cont diuresis as above  -Wean off O2 as tolerated  -CT chest with pulmonary edema and b/l pleural effusions, no PNA  -Low suspicion for PE as pt is on eliquis; however if CT chest negative, will obain VQ scan to r/o CTEPH  -Cont supportive care  -Pulm recs appreciated

## 2023-03-27 NOTE — PROGRESS NOTE ADULT - SUBJECTIVE AND OBJECTIVE BOX
[facility] Division of Hospital Medicine  Wes Hutchinson MD  Available via MS Teams  Pager [phone]    SUBJECTIVE / OVERNIGHT EVENTS: Patient seen and examined at bedside, resting comfortably and in no acute distress. Denies chest pain or palpitations. Reports breathing is improved and denies cough. ROS otherwise noncontributory.     MEDICATIONS  (STANDING):  albuterol/ipratropium for Nebulization 3 milliLiter(s) Nebulizer every 6 hours  apixaban 2.5 milliGRAM(s) Oral every 12 hours  aspirin  chewable 81 milliGRAM(s) Oral daily  atorvastatin 40 milliGRAM(s) Oral at bedtime  buMETAnide Infusion 1 mG/Hr (5 mL/Hr) IV Continuous <Continuous>  dextrose 5%. 1000 milliLiter(s) (100 mL/Hr) IV Continuous <Continuous>  dextrose 5%. 1000 milliLiter(s) (50 mL/Hr) IV Continuous <Continuous>  dextrose 50% Injectable 25 Gram(s) IV Push once  dextrose 50% Injectable 12.5 Gram(s) IV Push once  dextrose 50% Injectable 25 Gram(s) IV Push once  gabapentin 200 milliGRAM(s) Oral at bedtime  glucagon  Injectable 1 milliGRAM(s) IntraMuscular once  insulin glargine Injectable (LANTUS) 21 Unit(s) SubCutaneous at bedtime  insulin lispro (ADMELOG) corrective regimen sliding scale   SubCutaneous three times a day before meals  insulin lispro (ADMELOG) corrective regimen sliding scale   SubCutaneous at bedtime  insulin lispro Injectable (ADMELOG) 7 Unit(s) SubCutaneous three times a day before meals  methylPREDNISolone   Oral   methylPREDNISolone 4 milliGRAM(s) Oral before breakfast  methylPREDNISolone 4 milliGRAM(s) Oral at bedtime  metoprolol tartrate 100 milliGRAM(s) Oral every 12 hours  pantoprazole    Tablet 40 milliGRAM(s) Oral every 12 hours    MEDICATIONS  (PRN):  dextrose Oral Gel 15 Gram(s) Oral once PRN Blood Glucose LESS THAN 70 milliGRAM(s)/deciliter      I&O's Summary    26 Mar 2023 07:01  -  27 Mar 2023 07:00  --------------------------------------------------------  IN: 930 mL / OUT: 3600 mL / NET: -2670 mL    27 Mar 2023 07:01  -  27 Mar 2023 14:48  --------------------------------------------------------  IN: 300 mL / OUT: 800 mL / NET: -500 mL        PHYSICAL EXAM:  Vital Signs Last 24 Hrs  T(C): 36.8 (27 Mar 2023 12:49), Max: 36.8 (27 Mar 2023 12:12)  T(F): 98.2 (27 Mar 2023 12:49), Max: 98.3 (27 Mar 2023 12:12)  HR: 70 (27 Mar 2023 12:49) (69 - 74)  BP: 116/73 (27 Mar 2023 12:49) (116/73 - 130/74)  RR: 18 (27 Mar 2023 12:49) (18 - 18)  SpO2: 100% (27 Mar 2023 12:49) (96% - 100%)    Parameters below as of 27 Mar 2023 12:49  Patient On (Oxygen Delivery Method): nasal cannula  O2 Flow (L/min): 2    CONSTITUTIONAL: NAD, well-groomed  EYES: PERRLA; conjunctiva and sclera clear  ENMT: Moist oral mucosa, no pharyngeal injection or exudates; normal dentition  NECK: Supple, no palpable masses; no thyromegaly  RESPIRATORY: Normal respiratory effort; lungs are clear to auscultation bilaterally  CARDIOVASCULAR: normal S1 and S2, no murmur/rub/gallop; No lower extremity edema  ABDOMEN: Nontender to palpation, normoactive bowel sounds, no rebound/guarding; No hepatosplenomegaly  MUSCULOSKELETAL:  no clubbing or cyanosis of digits; no joint swelling or tenderness to palpation  PSYCH: A+O to person, place, and time; affect appropriate  NEUROLOGY: CN 2-12 are intact and symmetric; no gross sensory deficits   SKIN: No rashes; no palpable lesions    LABS:    03-27    140  |  87<L>  |  36<H>  ----------------------------<  217<H>  4.7   |  39<H>  |  1.53<H>    Ca    9.6      27 Mar 2023 07:03  Phos  2.8     03-26  Mg     1.7     03-26                SARS-CoV-2: NotDetec (22 Mar 2023 18:43)  SARS-CoV-2: NotDetec (06 Feb 2023 13:12)  SARS-CoV-2: NotDetec (01 Feb 2023 11:11)  COVID-19 PCR: NotDetec (29 Jan 2023 07:38)  SARS-CoV-2: NotDetec (23 Jan 2023 07:26)        RADIOLOGY & ADDITIONAL TESTS:  New Results Reviewed Today:   New Imaging Personally Reviewed Today:  New Electrocardiogram Personally Reviewed Today:  Prior or Outpatient Records Reviewed Today:    COMMUNICATION:  Care Discussed with Consultants/Other Providers and Details of Discussion:  Discussions with Patient/Family:  PCP Communication:     [facility] Division of Hospital Medicine  Wes Hutchinson MD  Available via MS Teams  Pager [phone]    SUBJECTIVE / OVERNIGHT EVENTS: Patient seen and examined at bedside, resting comfortably and in no acute distress. Denies chest pain or palpitations. Reports breathing is improved and denies cough. ROS otherwise noncontributory.     MEDICATIONS  (STANDING):  albuterol/ipratropium for Nebulization 3 milliLiter(s) Nebulizer every 6 hours  apixaban 2.5 milliGRAM(s) Oral every 12 hours  aspirin  chewable 81 milliGRAM(s) Oral daily  atorvastatin 40 milliGRAM(s) Oral at bedtime  buMETAnide Infusion 1 mG/Hr (5 mL/Hr) IV Continuous <Continuous>  dextrose 5%. 1000 milliLiter(s) (100 mL/Hr) IV Continuous <Continuous>  dextrose 5%. 1000 milliLiter(s) (50 mL/Hr) IV Continuous <Continuous>  dextrose 50% Injectable 25 Gram(s) IV Push once  dextrose 50% Injectable 12.5 Gram(s) IV Push once  dextrose 50% Injectable 25 Gram(s) IV Push once  gabapentin 200 milliGRAM(s) Oral at bedtime  glucagon  Injectable 1 milliGRAM(s) IntraMuscular once  insulin glargine Injectable (LANTUS) 21 Unit(s) SubCutaneous at bedtime  insulin lispro (ADMELOG) corrective regimen sliding scale   SubCutaneous three times a day before meals  insulin lispro (ADMELOG) corrective regimen sliding scale   SubCutaneous at bedtime  insulin lispro Injectable (ADMELOG) 7 Unit(s) SubCutaneous three times a day before meals  methylPREDNISolone   Oral   methylPREDNISolone 4 milliGRAM(s) Oral before breakfast  methylPREDNISolone 4 milliGRAM(s) Oral at bedtime  metoprolol tartrate 100 milliGRAM(s) Oral every 12 hours  pantoprazole    Tablet 40 milliGRAM(s) Oral every 12 hours    MEDICATIONS  (PRN):  dextrose Oral Gel 15 Gram(s) Oral once PRN Blood Glucose LESS THAN 70 milliGRAM(s)/deciliter      I&O's Summary    26 Mar 2023 07:01  -  27 Mar 2023 07:00  --------------------------------------------------------  IN: 930 mL / OUT: 3600 mL / NET: -2670 mL    27 Mar 2023 07:01  -  27 Mar 2023 14:48  --------------------------------------------------------  IN: 300 mL / OUT: 800 mL / NET: -500 mL        PHYSICAL EXAM:  Vital Signs Last 24 Hrs  T(C): 36.8 (27 Mar 2023 12:49), Max: 36.8 (27 Mar 2023 12:12)  T(F): 98.2 (27 Mar 2023 12:49), Max: 98.3 (27 Mar 2023 12:12)  HR: 70 (27 Mar 2023 12:49) (69 - 74)  BP: 116/73 (27 Mar 2023 12:49) (116/73 - 130/74)  RR: 18 (27 Mar 2023 12:49) (18 - 18)  SpO2: 100% (27 Mar 2023 12:49) (96% - 100%)    Parameters below as of 27 Mar 2023 12:49  Patient On (Oxygen Delivery Method): nasal cannula  O2 Flow (L/min): 2    CONSTITUTIONAL: NAD, well-groomed  EYES: PERRLA; conjunctiva and sclera clear  ENMT: Moist oral mucosa, no pharyngeal injection or exudates; normal dentition  NECK: Supple, no palpable masses; no thyromegaly  RESPIRATORY: Normal respiratory effort; lungs are clear to auscultation bilaterally  CARDIOVASCULAR: normal S1 and S2, no murmur/rub/gallop; No lower extremity edema  ABDOMEN: Nontender to palpation, normoactive bowel sounds, no rebound/guarding; No hepatosplenomegaly  MUSCULOSKELETAL:  no clubbing or cyanosis of digits; no joint swelling or tenderness to palpation  PSYCH: A+O to person, place, and time; affect appropriate  NEUROLOGY: CN 2-12 are intact and symmetric; no gross sensory deficits   SKIN: No rashes; no palpable lesions    LABS:    03-27    140  |  87<L>  |  36<H>  ----------------------------<  217<H>  4.7   |  39<H>  |  1.53<H>    Ca    9.6      27 Mar 2023 07:03  Phos  2.8     03-26  Mg     1.7     03-26                SARS-CoV-2: NotDetec (22 Mar 2023 18:43)  SARS-CoV-2: NotDetec (06 Feb 2023 13:12)  SARS-CoV-2: NotDetec (01 Feb 2023 11:11)  COVID-19 PCR: NotDetec (29 Jan 2023 07:38)  SARS-CoV-2: NotDetec (23 Jan 2023 07:26)

## 2023-03-27 NOTE — PROGRESS NOTE ADULT - SUBJECTIVE AND OBJECTIVE BOX
DATE OF SERVICE: 03-27-23 @ 15:23    Patient is a 83y old  Female who presents with a chief complaint of shortness of breath (27 Mar 2023 14:47)      INTERVAL HISTORY:     TELEMETRY Personally reviewed:  	  MEDICATIONS:  buMETAnide Infusion 1 mG/Hr IV Continuous <Continuous>  metoprolol tartrate 100 milliGRAM(s) Oral every 12 hours        PHYSICAL EXAM:  T(C): 36.8 (03-27-23 @ 12:49), Max: 36.8 (03-27-23 @ 12:12)  HR: 70 (03-27-23 @ 12:49) (69 - 74)  BP: 116/73 (03-27-23 @ 12:49) (116/73 - 130/74)  RR: 18 (03-27-23 @ 12:49) (18 - 18)  SpO2: 100% (03-27-23 @ 12:49) (96% - 100%)  Wt(kg): --  I&O's Summary    26 Mar 2023 07:01  -  27 Mar 2023 07:00  --------------------------------------------------------  IN: 930 mL / OUT: 3600 mL / NET: -2670 mL    27 Mar 2023 07:01  -  27 Mar 2023 15:23  --------------------------------------------------------  IN: 420 mL / OUT: 1200 mL / NET: -780 mL          Appearance: In no distress	  HEENT:    PERRL, EOMI	  Cardiovascular:  S1 S2, No JVD  Respiratory: Lungs clear to auscultation	  Gastrointestinal:  Soft, Non-tender, + BS	  Vascularature:  No edema of LE  Psychiatric: Appropriate affect   Neuro: no acute focal deficits           03-27    140  |  87<L>  |  36<H>  ----------------------------<  217<H>  4.7   |  39<H>  |  1.53<H>    Ca    9.6      27 Mar 2023 07:03  Phos  2.8     03-26  Mg     1.7     03-26          Labs personally reviewed      ASSESSMENT/PLAN: 	  83F w/ PMHx HTN, HLD, DM2, HFpEF (EF 65%), CAD s/p CABG, Breast CA s/p R partial mastectomy, rectal CA s/p resection with recent admission for acute rissa s/p ERCP w/ stent c/b gallbladder perf w/ perc rissa and recurrent SVT presenting for 1 week of shortness of breath and fluid retention. Patient has been at [facility] since her discharge from the hospital on 2/7/23. About a week ago she woke up in the middle of the night short of breath which continued to persist. She additionally complaints of epigastric burning sensation and early satiety. At [facility] she was placed on oxygen with minimal improvement in respiratory status, also received additional lasix 40 and metolazone today prior to coming to the ED. Denies CP, palpitations or syncope.    Problem/Plan -1  Problem: Acute on Chronic Diastolic CHF  - Patient with severe pulm pressures on echo  - CXR and CT chest with pulm edema  - proBNP 3205  - Repeat TTE shows preserved EF, hyperdynamic LV function, basal inferoseptum hypokinesis, decreased RV systolic function severe pulm pressures, B/L pleural effusions  - Strict I &Os, daily weights  - C/w IV Bumex 1mg/hr    Problem/Plan -2  Problem: CAD   - s/p CABG 2020  - ECG with no ischemia noted  - Trop 21  - c/w ASA, metropolol and statin  - TTE shows basal inferoseptum hypokinesis  - c/o chronic epigastric burning most likely noncardiac in nature given patient's recent cholecystitis     Problem/Plan -3  Problem: hx of Recurrent SVT  - c/w Metoprolol 100mg PO BID  - cont to monitor on tele    Problem/Plan -4  Problem: Atrial Fibrillation  - Patient with n/o A-flutter on previous admission  - c/w Metoprolol   - c/w eliquis 2.5mg PO BID            Sergey Winchester DO [facility]  Cardiovascular Medicine  [address], [address]  Office: [phone]  Available via Text/call on Microsoft Teams

## 2023-03-27 NOTE — PROGRESS NOTE ADULT - ASSESSMENT
on nasal cannula- attests to improving sob      albuterol/ipratropium for Nebulization 3 milliLiter(s) Nebulizer every 6 hours  apixaban 2.5 milliGRAM(s) Oral every 12 hours  aspirin  chewable 81 milliGRAM(s) Oral daily  atorvastatin 40 milliGRAM(s) Oral at bedtime  buMETAnide Infusion 1 mG/Hr IV Continuous <Continuous>  dextrose 5%. 1000 milliLiter(s) IV Continuous <Continuous>  dextrose 5%. 1000 milliLiter(s) IV Continuous <Continuous>  dextrose 50% Injectable 25 Gram(s) IV Push once  dextrose 50% Injectable 12.5 Gram(s) IV Push once  dextrose 50% Injectable 25 Gram(s) IV Push once  dextrose Oral Gel 15 Gram(s) Oral once PRN  gabapentin 200 milliGRAM(s) Oral at bedtime  glucagon  Injectable 1 milliGRAM(s) IntraMuscular once  insulin glargine Injectable (LANTUS) 21 Unit(s) SubCutaneous at bedtime  insulin lispro (ADMELOG) corrective regimen sliding scale   SubCutaneous three times a day before meals  insulin lispro (ADMELOG) corrective regimen sliding scale   SubCutaneous at bedtime  insulin lispro Injectable (ADMELOG) 7 Unit(s) SubCutaneous three times a day before meals  methylPREDNISolone   Oral   methylPREDNISolone 4 milliGRAM(s) Oral before breakfast  methylPREDNISolone 4 milliGRAM(s) Oral after lunch  methylPREDNISolone 4 milliGRAM(s) Oral after dinner  methylPREDNISolone 4 milliGRAM(s) Oral at bedtime  metoprolol tartrate 100 milliGRAM(s) Oral every 12 hours  pantoprazole    Tablet 40 milliGRAM(s) Oral every 12 hours      VITAL:  T(C): , Max: 36.8 (03-25-23 @ 17:51)  T(F): , Max: 98.2 (03-25-23 @ 17:51)  HR: 73 (03-26-23 @ 12:20)  BP: 106/68 (03-26-23 @ 12:20)  BP(mean): --  RR: 18 (03-26-23 @ 12:20)  SpO2: 97% (03-26-23 @ 12:20)  Wt(kg): --    03-25-23 @ 07:01  -  03-26-23 @ 07:00  --------------------------------------------------------  IN: 0 mL / OUT: 1900 mL / NET: -1900 mL    03-26-23 @ 07:01  -  03-26-23 @ 15:27  --------------------------------------------------------  IN: 460 mL / OUT: 600 mL / NET: -140 mL        PHYSICAL EXAM:  Constitutional: NAD  Respiratory: diminished at bases   Cardiovascular: S1 and S2, RRR  Gastrointestinal: + BS, soft, NT, ND  Extremities:  + leg edema   Neurological: AAO x 3, CN 2-12 intact  : No Dowling      LABS:      Na(141)/K(4.2)/Cl(90)/HCO3(38)/BUN(28)/Cr(1.42)Glu(248)/Ca(9.2)/Mg(--)/PO4(--)    03-26 @ 06:49  Na(140)/K(4.3)/Cl(92)/HCO3(39)/BUN(26)/Cr(1.44)Glu(246)/Ca(9.0)/Mg(1.8)/PO4(3.4)    03-26 @ 03:55  Na(149)/K(4.7)/Cl(89)/HCO3(38)/BUN(21)/Cr(1.26)Glu(235)/Ca(9.3)/Mg(1.9)/PO4(2.7)    03-25 @ 12:03  Na(141)/K(3.8)/Cl(97)/HCO3(35)/BUN(19)/Cr(1.28)Glu(176)/Ca(8.8)/Mg(1.9)/PO4(3.3)    03-24 @ 12:37  Na(141)/K(3.6)/Cl(96)/HCO3(36)/BUN(21)/Cr(1.30)Glu(124)/Ca(8.9)/Mg(1.7)/PO4(2.5)    03-23 @ 17:36            ASSESSMENT/PLAN  ASSESSMENT:    Patient is a 80 year old F with a PMHx of HTN, HLD, T2DM, HFpEF (EF 65% X/2022), breast cancer s/p R partial mastectomy, rectal carcinoma s/p resection, CAD s/p CABG (11/2020) recent ERCP , with cholecystotomy tube placement  who presents with sob  home bumex 1 mg PO     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,  nephrology aware , previous diuretic test showed minimal  function of that kidney)  CVS- Bp controlled at present  hypervolumic - improving  Electrolytes - controlled  Anemia- hgb improving(3/25)  ,etabolic aklalosis ---contraction in the view of diuretics use       RECOMMENDATION:  1)Renal:  scr is pretty much stable  continue  bumex 1mg gtt  get abg and postural hypotension   urine lytes   keep k ~4, mag 2   avoid nephrotoxic medication  dose all medications for gfr 30-35   ml/min  daily bmp , mag and Po4   renal diet   monitor I/Os  2)CVS: continue BP meds as ordered for now      [facility]  Office: [phone]

## 2023-03-27 NOTE — PROGRESS NOTE ADULT - PROBLEM SELECTOR PLAN 6
Per EP during recent admission, poor candidate for ablation.   -c/w metoprolol 100 BID  -c/w home eliquis 2.5 BID   -monitor on telemetry

## 2023-03-28 DIAGNOSIS — E87.3 ALKALOSIS: ICD-10-CM

## 2023-03-28 LAB
ANION GAP SERPL CALC-SCNC: 13 MMOL/L — SIGNIFICANT CHANGE UP (ref 5–17)
BUN SERPL-MCNC: 47 MG/DL — HIGH (ref 7–23)
CALCIUM SERPL-MCNC: 9.3 MG/DL — SIGNIFICANT CHANGE UP (ref 8.4–10.5)
CHLORIDE SERPL-SCNC: 86 MMOL/L — LOW (ref 96–108)
CO2 SERPL-SCNC: 39 MMOL/L — HIGH (ref 22–31)
CREAT SERPL-MCNC: 1.77 MG/DL — HIGH (ref 0.5–1.3)
EGFR: 28 ML/MIN/1.73M2 — LOW
GLUCOSE BLDC GLUCOMTR-MCNC: 127 MG/DL — HIGH (ref 70–99)
GLUCOSE BLDC GLUCOMTR-MCNC: 183 MG/DL — HIGH (ref 70–99)
GLUCOSE BLDC GLUCOMTR-MCNC: 229 MG/DL — HIGH (ref 70–99)
GLUCOSE BLDC GLUCOMTR-MCNC: 309 MG/DL — HIGH (ref 70–99)
GLUCOSE SERPL-MCNC: 242 MG/DL — HIGH (ref 70–99)
POTASSIUM SERPL-MCNC: 4.3 MMOL/L — SIGNIFICANT CHANGE UP (ref 3.5–5.3)
POTASSIUM SERPL-SCNC: 4.3 MMOL/L — SIGNIFICANT CHANGE UP (ref 3.5–5.3)
SODIUM SERPL-SCNC: 138 MMOL/L — SIGNIFICANT CHANGE UP (ref 135–145)

## 2023-03-28 PROCEDURE — 99233 SBSQ HOSP IP/OBS HIGH 50: CPT

## 2023-03-28 PROCEDURE — 99222 1ST HOSP IP/OBS MODERATE 55: CPT | Mod: GC

## 2023-03-28 RX ORDER — ACETAZOLAMIDE 250 MG/1
250 TABLET ORAL EVERY 12 HOURS
Refills: 0 | Status: DISCONTINUED | OUTPATIENT
Start: 2023-03-28 | End: 2023-03-28

## 2023-03-28 RX ORDER — ACETAZOLAMIDE 250 MG/1
250 TABLET ORAL EVERY 12 HOURS
Refills: 0 | Status: DISCONTINUED | OUTPATIENT
Start: 2023-03-28 | End: 2023-03-29

## 2023-03-28 RX ORDER — ACETAZOLAMIDE 250 MG/1
250 TABLET ORAL ONCE
Refills: 0 | Status: DISCONTINUED | OUTPATIENT
Start: 2023-03-28 | End: 2023-03-28

## 2023-03-28 RX ORDER — ACETAZOLAMIDE 250 MG/1
500 TABLET ORAL ONCE
Refills: 0 | Status: COMPLETED | OUTPATIENT
Start: 2023-03-28 | End: 2023-03-28

## 2023-03-28 RX ADMIN — Medication 81 MILLIGRAM(S): at 13:18

## 2023-03-28 RX ADMIN — Medication 3 MILLILITER(S): at 05:14

## 2023-03-28 RX ADMIN — GABAPENTIN 200 MILLIGRAM(S): 400 CAPSULE ORAL at 22:24

## 2023-03-28 RX ADMIN — APIXABAN 2.5 MILLIGRAM(S): 2.5 TABLET, FILM COATED ORAL at 17:42

## 2023-03-28 RX ADMIN — Medication 100 MILLIGRAM(S): at 17:43

## 2023-03-28 RX ADMIN — PANTOPRAZOLE SODIUM 40 MILLIGRAM(S): 20 TABLET, DELAYED RELEASE ORAL at 17:43

## 2023-03-28 RX ADMIN — Medication 3 MILLILITER(S): at 13:19

## 2023-03-28 RX ADMIN — ATORVASTATIN CALCIUM 40 MILLIGRAM(S): 80 TABLET, FILM COATED ORAL at 22:24

## 2023-03-28 RX ADMIN — Medication 100 MILLIGRAM(S): at 05:13

## 2023-03-28 RX ADMIN — Medication 4 MILLIGRAM(S): at 22:25

## 2023-03-28 RX ADMIN — Medication 7 UNIT(S): at 13:20

## 2023-03-28 RX ADMIN — Medication 3 MILLILITER(S): at 17:42

## 2023-03-28 RX ADMIN — Medication 2: at 09:17

## 2023-03-28 RX ADMIN — Medication 7 UNIT(S): at 09:18

## 2023-03-28 RX ADMIN — APIXABAN 2.5 MILLIGRAM(S): 2.5 TABLET, FILM COATED ORAL at 05:13

## 2023-03-28 RX ADMIN — Medication 4: at 13:19

## 2023-03-28 RX ADMIN — Medication 7 UNIT(S): at 17:41

## 2023-03-28 RX ADMIN — PANTOPRAZOLE SODIUM 40 MILLIGRAM(S): 20 TABLET, DELAYED RELEASE ORAL at 05:13

## 2023-03-28 RX ADMIN — INSULIN GLARGINE 21 UNIT(S): 100 INJECTION, SOLUTION SUBCUTANEOUS at 22:24

## 2023-03-28 RX ADMIN — Medication 1: at 17:40

## 2023-03-28 RX ADMIN — ACETAZOLAMIDE 500 MILLIGRAM(S): 250 TABLET ORAL at 17:39

## 2023-03-28 RX ADMIN — Medication 4 MILLIGRAM(S): at 06:25

## 2023-03-28 NOTE — PROGRESS NOTE ADULT - ASSESSMENT
83F with PMH of HTN, HLD, DM2, HFpEF (EF 65%), CAD s/p CABG, Breast CA s/p R partial mastectomy, rectal CA s/p resection with recent admission for acute rissa s/p ERCP w/ stent c/b gallbladder perf w/ perc rissa and recurrent SVT presenting for 1 week of shortness of breath and fluid retention concerning for possible HF exacerbation and admitted with AHRF

## 2023-03-28 NOTE — PROGRESS NOTE ADULT - PROBLEM SELECTOR PLAN 2
Likely 2/2 HF exac above; however pt also noted with hypercarbic and hypoxic respiratory failure and severe pulmonary hypertension on ECHO, with decreased RVSF; pt reports significant second hand smoke exposure. CXR with Left basilar pleural effusion and/or atelectasis. RVP negative  - Holding off on further diuresis given concurrent metabolic alkalosis, concerning for contraction alkalosis  -Wean off O2 as tolerated  -CT chest with pulmonary edema and b/l pleural effusions, no PNA  -Low suspicion for PE as pt is on eliquis, however may warrant VQ scan to r/o CTEPH if patient continues to require O2  -Cont supportive care  -Pulm recs appreciated

## 2023-03-28 NOTE — PROGRESS NOTE ADULT - PROBLEM SELECTOR PLAN 1
TTE as above with EF 75%, b/l pleural effusions, BNP elevated. S/p IV Bumex, switched to Bumex gtt 3/25 due to persistent edema on imaging and lack of symptomatic improvement. Patient now off diuresis due to contraction alkalosis. Shortness of breath is improving  -Strict Is/Os, daily weights, 1L fluid restriction  -cardiology recs appreciated  -Monitor BMP BID and replete

## 2023-03-28 NOTE — PROGRESS NOTE ADULT - ASSESSMENT
on nasal cannula- attests to improving sob      albuterol/ipratropium for Nebulization 3 milliLiter(s) Nebulizer every 6 hours  apixaban 2.5 milliGRAM(s) Oral every 12 hours  aspirin  chewable 81 milliGRAM(s) Oral daily  atorvastatin 40 milliGRAM(s) Oral at bedtime  buMETAnide Infusion 1 mG/Hr IV Continuous <Continuous>  dextrose 5%. 1000 milliLiter(s) IV Continuous <Continuous>  dextrose 5%. 1000 milliLiter(s) IV Continuous <Continuous>  dextrose 50% Injectable 25 Gram(s) IV Push once  dextrose 50% Injectable 12.5 Gram(s) IV Push once  dextrose 50% Injectable 25 Gram(s) IV Push once  dextrose Oral Gel 15 Gram(s) Oral once PRN  gabapentin 200 milliGRAM(s) Oral at bedtime  glucagon  Injectable 1 milliGRAM(s) IntraMuscular once  insulin glargine Injectable (LANTUS) 21 Unit(s) SubCutaneous at bedtime  insulin lispro (ADMELOG) corrective regimen sliding scale   SubCutaneous three times a day before meals  insulin lispro (ADMELOG) corrective regimen sliding scale   SubCutaneous at bedtime  insulin lispro Injectable (ADMELOG) 7 Unit(s) SubCutaneous three times a day before meals  methylPREDNISolone   Oral   methylPREDNISolone 4 milliGRAM(s) Oral before breakfast  methylPREDNISolone 4 milliGRAM(s) Oral after lunch  methylPREDNISolone 4 milliGRAM(s) Oral after dinner  methylPREDNISolone 4 milliGRAM(s) Oral at bedtime  metoprolol tartrate 100 milliGRAM(s) Oral every 12 hours  pantoprazole    Tablet 40 milliGRAM(s) Oral every 12 hours      VITAL:  T(C): , Max: 36.8 (03-25-23 @ 17:51)  T(F): , Max: 98.2 (03-25-23 @ 17:51)  HR: 73 (03-26-23 @ 12:20)  BP: 106/68 (03-26-23 @ 12:20)  BP(mean): --  RR: 18 (03-26-23 @ 12:20)  SpO2: 97% (03-26-23 @ 12:20)  Wt(kg): --    03-25-23 @ 07:01  -  03-26-23 @ 07:00  --------------------------------------------------------  IN: 0 mL / OUT: 1900 mL / NET: -1900 mL    03-26-23 @ 07:01  -  03-26-23 @ 15:27  --------------------------------------------------------  IN: 460 mL / OUT: 600 mL / NET: -140 mL        PHYSICAL EXAM:  Constitutional: NAD  Respiratory: diminished at bases   Cardiovascular: S1 and S2, RRR  Gastrointestinal: + BS, soft, NT, ND  Extremities:   leg edema improved   Neurological: AAO x 3, CN 2-12 intact  : No Dowling      LABS:      Na(141)/K(4.2)/Cl(90)/HCO3(38)/BUN(28)/Cr(1.42)Glu(248)/Ca(9.2)/Mg(--)/PO4(--)    03-26 @ 06:49  Na(140)/K(4.3)/Cl(92)/HCO3(39)/BUN(26)/Cr(1.44)Glu(246)/Ca(9.0)/Mg(1.8)/PO4(3.4)    03-26 @ 03:55  Na(149)/K(4.7)/Cl(89)/HCO3(38)/BUN(21)/Cr(1.26)Glu(235)/Ca(9.3)/Mg(1.9)/PO4(2.7)    03-25 @ 12:03  Na(141)/K(3.8)/Cl(97)/HCO3(35)/BUN(19)/Cr(1.28)Glu(176)/Ca(8.8)/Mg(1.9)/PO4(3.3)    03-24 @ 12:37  Na(141)/K(3.6)/Cl(96)/HCO3(36)/BUN(21)/Cr(1.30)Glu(124)/Ca(8.9)/Mg(1.7)/PO4(2.5)    03-23 @ 17:36            ASSESSMENT/PLAN  ASSESSMENT:    Patient is a 80 year old F with a PMHx of HTN, HLD, T2DM, HFpEF (EF 65% X/2022), breast cancer s/p R partial mastectomy, rectal carcinoma s/p resection, CAD s/p CABG (11/2020) recent ERCP , with cholecystotomy tube placement  who presents with sob  home bumex 1 mg PO     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,  nephrology aware , previous diuretic test showed minimal  function of that kidney)  CVS- Bp controlled at present  hypervolumic - improving  Electrolytes - controlled  Anemia- hgb improving(3/25)  metabolic alkalosis ---contraction in the view of diuretics use   GI --- s/p  ERCP not a surgical candidate for cholecystotomy  on previous admission     RECOMMENDATION:  1)Renal:  scr is pretty much stable  orthostatic positive yesterday   bumex was held ,started  on 1/2 ns 50cc/h   repeat orthostatic  abg and BNP   keep k ~4, mag 2   avoid nephrotoxic medication  dose all medications for gfr 30-35   ml/min  daily bmp , mag and Po4   renal diet   monitor I/Os  2)CVS: continue BP meds as ordered for now        d-w daughter   Brenda Arias [facility]  Office: [phone]     on nasal cannula- attests to improving sob      albuterol/ipratropium for Nebulization 3 milliLiter(s) Nebulizer every 6 hours  apixaban 2.5 milliGRAM(s) Oral every 12 hours  aspirin  chewable 81 milliGRAM(s) Oral daily  atorvastatin 40 milliGRAM(s) Oral at bedtime  buMETAnide Infusion 1 mG/Hr IV Continuous <Continuous>  dextrose 5%. 1000 milliLiter(s) IV Continuous <Continuous>  dextrose 5%. 1000 milliLiter(s) IV Continuous <Continuous>  dextrose 50% Injectable 25 Gram(s) IV Push once  dextrose 50% Injectable 12.5 Gram(s) IV Push once  dextrose 50% Injectable 25 Gram(s) IV Push once  dextrose Oral Gel 15 Gram(s) Oral once PRN  gabapentin 200 milliGRAM(s) Oral at bedtime  glucagon  Injectable 1 milliGRAM(s) IntraMuscular once  insulin glargine Injectable (LANTUS) 21 Unit(s) SubCutaneous at bedtime  insulin lispro (ADMELOG) corrective regimen sliding scale   SubCutaneous three times a day before meals  insulin lispro (ADMELOG) corrective regimen sliding scale   SubCutaneous at bedtime  insulin lispro Injectable (ADMELOG) 7 Unit(s) SubCutaneous three times a day before meals  methylPREDNISolone   Oral   methylPREDNISolone 4 milliGRAM(s) Oral before breakfast  methylPREDNISolone 4 milliGRAM(s) Oral after lunch  methylPREDNISolone 4 milliGRAM(s) Oral after dinner  methylPREDNISolone 4 milliGRAM(s) Oral at bedtime  metoprolol tartrate 100 milliGRAM(s) Oral every 12 hours  pantoprazole    Tablet 40 milliGRAM(s) Oral every 12 hours      VITAL:  T(C): , Max: 36.8 (03-25-23 @ 17:51)  T(F): , Max: 98.2 (03-25-23 @ 17:51)  HR: 73 (03-26-23 @ 12:20)  BP: 106/68 (03-26-23 @ 12:20)  BP(mean): --  RR: 18 (03-26-23 @ 12:20)  SpO2: 97% (03-26-23 @ 12:20)  Wt(kg): --    03-25-23 @ 07:01  -  03-26-23 @ 07:00  --------------------------------------------------------  IN: 0 mL / OUT: 1900 mL / NET: -1900 mL    03-26-23 @ 07:01  -  03-26-23 @ 15:27  --------------------------------------------------------  IN: 460 mL / OUT: 600 mL / NET: -140 mL        PHYSICAL EXAM:  Constitutional: NAD  Respiratory: diminished at bases   Cardiovascular: S1 and S2, RRR  Gastrointestinal: + BS, soft, NT, ND  Extremities:   leg edema improved   Neurological: AAO x 3, CN 2-12 intact  : No Dowling      LABS:      Na(141)/K(4.2)/Cl(90)/HCO3(38)/BUN(28)/Cr(1.42)Glu(248)/Ca(9.2)/Mg(--)/PO4(--)    03-26 @ 06:49  Na(140)/K(4.3)/Cl(92)/HCO3(39)/BUN(26)/Cr(1.44)Glu(246)/Ca(9.0)/Mg(1.8)/PO4(3.4)    03-26 @ 03:55  Na(149)/K(4.7)/Cl(89)/HCO3(38)/BUN(21)/Cr(1.26)Glu(235)/Ca(9.3)/Mg(1.9)/PO4(2.7)    03-25 @ 12:03  Na(141)/K(3.8)/Cl(97)/HCO3(35)/BUN(19)/Cr(1.28)Glu(176)/Ca(8.8)/Mg(1.9)/PO4(3.3)    03-24 @ 12:37  Na(141)/K(3.6)/Cl(96)/HCO3(36)/BUN(21)/Cr(1.30)Glu(124)/Ca(8.9)/Mg(1.7)/PO4(2.5)    03-23 @ 17:36            ASSESSMENT/PLAN  ASSESSMENT:    Patient is a 80 year old F with a PMHx of HTN, HLD, T2DM, HFpEF (EF 65% X/2022), breast cancer s/p R partial mastectomy, rectal carcinoma s/p resection, CAD s/p CABG (11/2020) recent ERCP , with cholecystotomy tube placement  who presents with sob  home bumex 1 mg PO     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,  nephrology aware , previous diuretic test showed minimal  function of that kidney)  CVS- Bp controlled at present  hypervolumic - improving  Electrolytes - controlled  Anemia- hgb improving(3/25)  metabolic alkalosis ---contraction in the view of diuretics use   GI --- s/p  ERCP not a surgical candidate for cholecystotomy  on previous admission     RECOMMENDATION:  1)Renal:  scr is pretty much stable  orthostatic positive yesterday 2/27  bumex was held ,started  on 1/2 ns 50cc/h , restart 1/2  ns 50 cc/h   repeat orthostatic  abg and BNP   keep k ~4, mag 2   avoid nephrotoxic medication  dose all medications for gfr 30-35   ml/min  daily bmp , mag and Po4   renal diet   monitor I/Os  2)CVS: continue BP meds as ordered for now        d-w daughter   Brenda Rader  [facility]  Office: [phone]

## 2023-03-28 NOTE — PROGRESS NOTE ADULT - PROBLEM SELECTOR PLAN 3
Alkalosis noted on ABG from overnight. Likely mixed metabolic and respiratory.  - Hold further Bumex as described above  - Will trial acetazolamide 500 mg PO Qd 1x today

## 2023-03-28 NOTE — CONSULT NOTE ADULT - ASSESSMENT
83 year old female with history of HTN, HLD, T2DM, HFpEF, CAD s/p CABG, breast cancer, rectal cancer, acute cholecystitis s/p perc rissa tube 1/25/2023, and choledocholithiasis s/p ERCP and 10F x 5cm plastic biliary stent placement 1/20/2023 who presents with dyspnea, found to be in CHF exacerbation requiring Bumex gtt, with hospital course c/b contraction alkalosis and orthostatic hypotension.  Advanced GI consulted for management of biliary stent.  Patient had acute cholecystitis in January 2023 in addition to choledocholithiasis requiring ERCP on 1/20/2023 with placement of 10F x 5cm plastic biliary stent placement.  She was deemed a nonsurgical candidate and underwent placement of perc rissa tube on 1/25/2023.  She had planned repeat ERCP after 3 months for stent removal/exchange.      # Acute cholecystitis s/p perc rissa drain  # Choledocholithiasis s/p 10F x 5cm plastic biliary stent placement 1/20/2023  # CHF exacerbation  # Contraction alkalosis  # Orthostatic hypotension    Recommendations:  -trend clinical symptoms, exam findings, vital signs, CBC, CMP  -can consider ERCP for stent removal/exchange once medically optimized, however could also take place as outpatient    Note incomplete until finalized by attending signature/attestation.    Pedro Nielson  GI/Hepatology Fellow    MONDAY-FRIDAY 8AM-5PM:  Pager# [phone] ([facility]) or [phone] ([facility])    NON-URGENT CONSULTS:  Please email [email or web address]  AT NIGHT AND ON WEEKENDS:  Contact on-call GI fellow via answering service ([phone]) from 5pm-8am and on weekends/holidays

## 2023-03-28 NOTE — CONSULT NOTE ADULT - SUBJECTIVE AND OBJECTIVE BOX
HPI:  NURIA GARCIA is a 83 year old female with history of HTN, HLD, T2DM, HFpEF, CAD s/p CABG, breast cancer, rectal cancer, acute cholecystitis s/p perc rissa tube 1/25/2023, and choledocholithiasis s/p ERCP and 10F x 5cm plastic biliary stent placement 1/20/2023 who presents with dyspnea.    Patient presents to hospital with dyspnea, found to be in CHF exacerbation requiring Bumex gtt, with hospital course c/b contraction alkalosis and orthostatic hypotension.  Advanced GI consulted for management of biliary stent.  Patient had acute cholecystitis in January 2023 in addition to choledocholithiasis requiring ERCP on 1/20/2023 with placement of 10F x 5cm plastic biliary stent placement.  She was deemed a nonsurgical candidate and underwent placement of perc rissa tube on 1/25/2023.  She had planned repeat ERCP after 3 months for stent removal/exchange.      ROS:   General:  No fevers, chills, night sweats, fatigue  Eyes:  Good vision, no reported pain  ENT:  No sore throat, pain, runny nose  CV:  No pain, palpitations  Pulm:  No dyspnea, cough  GI:  See HPI, otherwise negative  :  No  incontinence, nocturia  Muscle:  No pain, weakness  Neuro:  No memory problems  Psych:  No insomnia, mood problems, depression  Endocrine:  No polyuria, polydipsia, cold/heat intolerance  Heme:  No petechiae, ecchymosis, easy bruisability  Skin:  No rash    PMHX/PSHX:    DM (diabetes mellitus)    HTN (hypertension)    Hyperlipidemia    Obesity (BMI 35.0-39.9 without comorbidity)    Breast CA, right    Colon cancer    Lymphedema of arm    Cancer    Rectal cancer    Breast CA, right    HTN (hypertension)    Diabetes mellitus    Lymphedema of arm    Hyperlipidemia    Rectal cancer    S/P chemotherapy, time since greater than 12 weeks    S/P radiation > 12 weeks    Obese    Type II diabetes mellitus    CHF (congestive heart failure)    DVT of leg (deep venous thrombosis)    Elevated blood pressure reading in office with diagnosis of hypertension    Renal insufficiency    H/O mastectomy, right    History of tonsillectomy    S/P colectomy    S/P mastectomy, right    History of appendectomy    S/P ileostomy    S/P colon resection    S/P TKR (total knee replacement), right      Allergies:  CT scan dye (Hives)  penicillin (Unknown)      Home Medications: reviewed  Hospital Medications:  albuterol/ipratropium for Nebulization 3 milliLiter(s) Nebulizer every 6 hours  apixaban 2.5 milliGRAM(s) Oral every 12 hours  aspirin  chewable 81 milliGRAM(s) Oral daily  atorvastatin 40 milliGRAM(s) Oral at bedtime  dextrose 5%. 1000 milliLiter(s) IV Continuous <Continuous>  dextrose 5%. 1000 milliLiter(s) IV Continuous <Continuous>  dextrose 50% Injectable 25 Gram(s) IV Push once  dextrose 50% Injectable 12.5 Gram(s) IV Push once  dextrose 50% Injectable 25 Gram(s) IV Push once  dextrose Oral Gel 15 Gram(s) Oral once PRN  gabapentin 200 milliGRAM(s) Oral at bedtime  glucagon  Injectable 1 milliGRAM(s) IntraMuscular once  insulin glargine Injectable (LANTUS) 21 Unit(s) SubCutaneous at bedtime  insulin lispro (ADMELOG) corrective regimen sliding scale   SubCutaneous three times a day before meals  insulin lispro (ADMELOG) corrective regimen sliding scale   SubCutaneous at bedtime  insulin lispro Injectable (ADMELOG) 7 Unit(s) SubCutaneous three times a day before meals  methylPREDNISolone   Oral   methylPREDNISolone 4 milliGRAM(s) Oral before breakfast  methylPREDNISolone 4 milliGRAM(s) Oral at bedtime  metoprolol tartrate 100 milliGRAM(s) Oral every 12 hours  pantoprazole    Tablet 40 milliGRAM(s) Oral every 12 hours  sodium chloride 0.45%. 1000 milliLiter(s) IV Continuous <Continuous>      Social History:   Tobacco: denies  Alcohol: denies  Recreational drugs: denies    Family history:    No pertinent family history in first degree relatives    No pertinent family history in first degree relatives    Family history of diabetes mellitus in mother    Family history of diabetes mellitus in son    Family history of heart attack (Child)      Denies family history of colon cancer/polyps, stomach cancer/polyps, pancreatic cancer/masses, liver cancer/disease, ovarian cancer and endometrial cancer.    PHYSICAL EXAM:   Vital Signs:  Vital Signs Last 24 Hrs  T(C): 36.3 (28 Mar 2023 03:59), Max: 36.8 (27 Mar 2023 12:12)  T(F): 97.4 (28 Mar 2023 03:59), Max: 98.3 (27 Mar 2023 12:12)  HR: 72 (28 Mar 2023 03:59) (69 - 77)  BP: 128/55 (28 Mar 2023 03:59) (92/64 - 130/74)  BP(mean): --  RR: 18 (28 Mar 2023 03:59) (18 - 18)  SpO2: 100% (28 Mar 2023 03:59) (95% - 100%)    Parameters below as of 28 Mar 2023 03:59  Patient On (Oxygen Delivery Method): nasal cannula  O2 Flow (L/min): 3    Daily     Daily     GENERAL: no acute distress  NEURO: alert  HEENT: NCAT, no conjunctival pallor appreciated  CHEST: no respiratory distress, no accessory muscle use  CARDIAC: regular rate, +S1/S2  ABDOMEN: soft, nontender, no rebound or guarding  EXTREMITIES: warm, well perfused  SKIN: no lesions noted    LABS: reviewed    03-27    137  |  84<L>  |  44<H>  ----------------------------<  246<H>  4.1   |  38<H>  |  1.89<H>    Ca    9.1      27 Mar 2023 19:59  Phos  2.8     03-26  Mg     1.7     03-26            Diagnostic Studies: see sunrise for full report

## 2023-03-28 NOTE — PROGRESS NOTE ADULT - TIME BILLING
- preparing to see the patient (eg, review of tests, documents)  - obtaining and/or reviewing separately obtained history  - performing a medically appropriate examination and/or evaluation  - counseling and educating the patient/family/caregiver  - ordering medications, tests, or procedures  - referring and communicating with other health care professionals  - documenting clinical information in the electronic or other health record  - independently interpreting results and communicating results to the patient/family/caregiver  - care coordination
Interpretation of labs, including ABG  Considered and ordered new medication, acetazolamide   Personally performed bedside POCUS of the lungs and heart  Case discussed at length with cardiology attending, Dr Sergey Winchester  Case discussed with patients daughter over phone
- preparing to see the patient (eg, review of tests, documents)  - obtaining and/or reviewing separately obtained history  - performing a medically appropriate examination and/or evaluation  - counseling and educating the patient/family/caregiver  - ordering medications, tests, or procedures  - referring and communicating with other health care professionals  - documenting clinical information in the electronic or other health record  - independently interpreting results and communicating results to the patient/family/caregiver  - care coordination
- preparing to see the patient (eg, review of tests, documents)  - obtaining and/or reviewing separately obtained history  - performing a medically appropriate examination and/or evaluation  - counseling and educating the patient/family/caregiver  - ordering medications, tests, or procedures  - referring and communicating with other health care professionals  - documenting clinical information in the electronic or other health record  - independently interpreting results and communicating results to the patient/family/caregiver  - care coordination

## 2023-03-28 NOTE — PROGRESS NOTE ADULT - SUBJECTIVE AND OBJECTIVE BOX
DATE OF SERVICE: 03-28-23 @ 15:22    Patient is a 83y old  Female who presents with a chief complaint of shortness of breath (28 Mar 2023 12:54)      INTERVAL HISTORY: Feeling better.     REVIEW OF SYSTEMS:  CONSTITUTIONAL: No weakness  EYES/ENT: No visual changes;  No throat pain   NECK: No pain or stiffness  RESPIRATORY: No cough, wheezing; No shortness of breath  CARDIOVASCULAR: No chest pain or palpitations  GASTROINTESTINAL: No abdominal  pain. No nausea, vomiting, or hematemesis  GENITOURINARY: No dysuria, frequency or hematuria  NEUROLOGICAL: No stroke like symptoms  SKIN: No rashes    TELEMETRY Personally reviewed: SR 60-70 PAT 130s for 2.8 sec  	  MEDICATIONS:  metoprolol tartrate 100 milliGRAM(s) Oral every 12 hours        PHYSICAL EXAM:  T(C): 36.9 (03-28-23 @ 11:41), Max: 36.9 (03-28-23 @ 11:41)  HR: 66 (03-28-23 @ 11:41) (66 - 77)  BP: 102/64 (03-28-23 @ 11:41) (92/64 - 130/74)  RR: 18 (03-28-23 @ 11:41) (18 - 18)  SpO2: 95% (03-28-23 @ 11:41) (95% - 100%)  Wt(kg): --  I&O's Summary    27 Mar 2023 07:01  -  28 Mar 2023 07:00  --------------------------------------------------------  IN: 1075 mL / OUT: 1800 mL / NET: -725 mL    28 Mar 2023 07:01  -  28 Mar 2023 15:22  --------------------------------------------------------  IN: 720 mL / OUT: 900 mL / NET: -180 mL          Appearance: In no distress	  HEENT:    PERRL, EOMI	  Cardiovascular:  S1 S2, No JVD  Respiratory: diminished, exp wheeze  Gastrointestinal:  Soft, Non-tender, + BS	  Vascularature:  No edema of LE  Psychiatric: Appropriate affect   Neuro: no acute focal deficits           03-28    138  |  86<L>  |  47<H>  ----------------------------<  242<H>  4.3   |  39<H>  |  1.77<H>    Ca    9.3      28 Mar 2023 10:30  Phos  2.8     03-26  Mg     1.7     03-26          Labs personally reviewed      ASSESSMENT/PLAN: 	  83F w/ PMHx HTN, HLD, DM2, HFpEF (EF 65%), CAD s/p CABG, Breast CA s/p R partial mastectomy, rectal CA s/p resection with recent admission for acute rissa s/p ERCP w/ stent c/b gallbladder perf w/ perc rissa and recurrent SVT presenting for 1 week of shortness of breath and fluid retention. Patient has been at [facility] since her discharge from the hospital on 2/7/23. About a week ago she woke up in the middle of the night short of breath which continued to persist. She additionally complaints of epigastric burning sensation and early satiety. At [facility] she was placed on oxygen with minimal improvement in respiratory status, also received additional lasix 40 and metolazone today prior to coming to the ED. Denies CP, palpitations or syncope.    Problem/Plan -1  Problem: Acute on Chronic Diastolic CHF  - Patient with severe pulm pressures on echo  - CXR and CT chest with pulm edema  - proBNP 3205  - Repeat TTE shows preserved EF, hyperdynamic LV function, basal inferoseptum hypokinesis, decreased RV systolic function severe pulm pressures, B/L pleural effusions  - Strict I &Os, daily weights  - 3/27 Orthostatic. Appears near euvolemic. Consider checking proBNP.   - Ok to cont to hold Bumex for now.    Problem/Plan -2  Problem: CAD   - s/p CABG 2020  - ECG with no ischemia noted  - Trop 21  - c/w ASA, metropolol and statin  - TTE shows basal inferoseptum hypokinesis  - c/o chronic epigastric burning most likely noncardiac in nature given patient's recent cholecystitis     Problem/Plan -3  Problem: hx of Recurrent SVT  - c/w Metoprolol 100mg PO BID  - cont to monitor on tele    Problem/Plan -4  Problem: Atrial Fibrillation  - Patient with n/o A-flutter on previous admission  - c/w Metoprolol   - c/w eliquis 2.5mg PO BID          FABIOLA Smith-SEE Winchester DO [facility]  Cardiovascular Medicine  [address] [place] [address], [address]  Available through call or text on [employer] TEAMs  Office: [phone]   DATE OF SERVICE: 03-28-23 @ 15:22    Patient is a 83y old  Female who presents with a chief complaint of shortness of breath (28 Mar 2023 12:54)      INTERVAL HISTORY: Feeling better.     REVIEW OF SYSTEMS:  CONSTITUTIONAL: No weakness  EYES/ENT: No visual changes;  No throat pain   NECK: No pain or stiffness  RESPIRATORY: No cough, wheezing; No shortness of breath  CARDIOVASCULAR: No chest pain or palpitations  GASTROINTESTINAL: No abdominal  pain. No nausea, vomiting, or hematemesis  GENITOURINARY: No dysuria, frequency or hematuria  NEUROLOGICAL: No stroke like symptoms  SKIN: No rashes    TELEMETRY Personally reviewed: SR 60-70 PAT 130s for 2.8 sec  	  MEDICATIONS:  metoprolol tartrate 100 milliGRAM(s) Oral every 12 hours        PHYSICAL EXAM:  T(C): 36.9 (03-28-23 @ 11:41), Max: 36.9 (03-28-23 @ 11:41)  HR: 66 (03-28-23 @ 11:41) (66 - 77)  BP: 102/64 (03-28-23 @ 11:41) (92/64 - 130/74)  RR: 18 (03-28-23 @ 11:41) (18 - 18)  SpO2: 95% (03-28-23 @ 11:41) (95% - 100%)  Wt(kg): --  I&O's Summary    27 Mar 2023 07:01  -  28 Mar 2023 07:00  --------------------------------------------------------  IN: 1075 mL / OUT: 1800 mL / NET: -725 mL    28 Mar 2023 07:01  -  28 Mar 2023 15:22  --------------------------------------------------------  IN: 720 mL / OUT: 900 mL / NET: -180 mL          Appearance: In no distress	  HEENT:    PERRL, EOMI	  Cardiovascular:  S1 S2, No JVD  Respiratory: diminished, exp wheeze  Gastrointestinal:  Soft, Non-tender, + BS	  Vascularature:  No edema of LE  Psychiatric: Appropriate affect   Neuro: no acute focal deficits           03-28    138  |  86<L>  |  47<H>  ----------------------------<  242<H>  4.3   |  39<H>  |  1.77<H>    Ca    9.3      28 Mar 2023 10:30  Phos  2.8     03-26  Mg     1.7     03-26          Labs personally reviewed      ASSESSMENT/PLAN: 	  83F w/ PMHx HTN, HLD, DM2, HFpEF (EF 65%), CAD s/p CABG, Breast CA s/p R partial mastectomy, rectal CA s/p resection with recent admission for acute rissa s/p ERCP w/ stent c/b gallbladder perf w/ perc rissa and recurrent SVT presenting for 1 week of shortness of breath and fluid retention. Patient has been at [facility] since her discharge from the hospital on 2/7/23. About a week ago she woke up in the middle of the night short of breath which continued to persist. She additionally complaints of epigastric burning sensation and early satiety. At [facility] she was placed on oxygen with minimal improvement in respiratory status, also received additional lasix 40 and metolazone today prior to coming to the ED. Denies CP, palpitations or syncope.    Problem/Plan -1  Problem: Acute on Chronic Diastolic CHF  - Patient with severe pulm pressures on echo  - CXR and CT chest with pulm edema  - proBNP 3205  - Repeat TTE shows preserved EF, hyperdynamic LV function, basal inferoseptum hypokinesis, decreased RV systolic function severe pulm pressures, B/L pleural effusions  - 3/27 Orthostatic. Appears near euvolemic. Repeat proBNP in AM  - Ok to cont to hold Bumex for now.  - bedside US with no pulm edema, small IVC with contraction alkalosis    Problem/Plan -2  Problem: CAD   - s/p CABG 2020  - ECG with no ischemia noted  - Trop 21  - c/w ASA, metropolol and statin  - TTE shows basal inferoseptum hypokinesis  - c/o chronic epigastric burning most likely noncardiac in nature given patient's recent cholecystitis     Problem/Plan -3  Problem: hx of Recurrent SVT  - c/w Metoprolol 100mg PO BID  - cont to monitor on tele    Problem/Plan -4  Problem: Atrial Fibrillation  - Patient with n/o A-flutter on previous admission  - c/w Metoprolol   - c/w eliquis 2.5mg PO BID          FABIOLA Smith-NP   Sergey Winchester DO [facility]  Cardiovascular Medicine  [address] [place] [address], [address]  Available through call or text on [employer] TEAMs  Office: [phone]

## 2023-03-28 NOTE — CONSULT NOTE ADULT - ATTENDING COMMENTS
As above  83F with multiple comorbidities here with heart failure exacerbation  Previous admission for cholecystitis and choledocholithiasis s/p ERCP with stent placement  Adv GI consulted for stent removal (previously planned as an outpatient)  This can be done when the patient improves clinically prior to discharge - please let us know when medically/cardiac status optimized     Thank you for this interesting consult.  Please call the advanced GI service with any questions or concerns.

## 2023-03-28 NOTE — PROGRESS NOTE ADULT - SUBJECTIVE AND OBJECTIVE BOX
[facility]  Wes Hutchinson MD  Available via MS Teams  Pager [phone]    SUBJECTIVE / OVERNIGHT EVENTS: Patient seen and examined at bedside, resting comfortably and in no acute distress. Denies chest pain or palpitations. Endorses shortness of breath however reports it is improving. Denies syncope or near syncope. Denies abdominal pain, nausea or vomiting. ROS otherwise negative.     MEDICATIONS  (STANDING):  acetaZOLAMIDE    Tablet 500 milliGRAM(s) Oral once  albuterol/ipratropium for Nebulization 3 milliLiter(s) Nebulizer every 6 hours  apixaban 2.5 milliGRAM(s) Oral every 12 hours  aspirin  chewable 81 milliGRAM(s) Oral daily  atorvastatin 40 milliGRAM(s) Oral at bedtime  dextrose 5%. 1000 milliLiter(s) (100 mL/Hr) IV Continuous <Continuous>  dextrose 5%. 1000 milliLiter(s) (50 mL/Hr) IV Continuous <Continuous>  dextrose 50% Injectable 25 Gram(s) IV Push once  dextrose 50% Injectable 12.5 Gram(s) IV Push once  dextrose 50% Injectable 25 Gram(s) IV Push once  gabapentin 200 milliGRAM(s) Oral at bedtime  glucagon  Injectable 1 milliGRAM(s) IntraMuscular once  insulin glargine Injectable (LANTUS) 21 Unit(s) SubCutaneous at bedtime  insulin lispro (ADMELOG) corrective regimen sliding scale   SubCutaneous three times a day before meals  insulin lispro (ADMELOG) corrective regimen sliding scale   SubCutaneous at bedtime  insulin lispro Injectable (ADMELOG) 7 Unit(s) SubCutaneous three times a day before meals  methylPREDNISolone   Oral   methylPREDNISolone 4 milliGRAM(s) Oral before breakfast  methylPREDNISolone 4 milliGRAM(s) Oral at bedtime  metoprolol tartrate 100 milliGRAM(s) Oral every 12 hours  pantoprazole    Tablet 40 milliGRAM(s) Oral every 12 hours    MEDICATIONS  (PRN):  dextrose Oral Gel 15 Gram(s) Oral once PRN Blood Glucose LESS THAN 70 milliGRAM(s)/deciliter      I&O's Summary    27 Mar 2023 07:01  -  28 Mar 2023 07:00  --------------------------------------------------------  IN: 1075 mL / OUT: 1800 mL / NET: -725 mL    28 Mar 2023 07:01  -  28 Mar 2023 15:58  --------------------------------------------------------  IN: 720 mL / OUT: 900 mL / NET: -180 mL        PHYSICAL EXAM:  Vital Signs Last 24 Hrs  T(C): 36.9 (28 Mar 2023 11:41), Max: 36.9 (28 Mar 2023 11:41)  T(F): 98.4 (28 Mar 2023 11:41), Max: 98.4 (28 Mar 2023 11:41)  HR: 66 (28 Mar 2023 11:41) (66 - 77)  BP: 102/64 (28 Mar 2023 11:41) (92/64 - 128/55)  RR: 18 (28 Mar 2023 11:41) (18 - 18)  SpO2: 95% (28 Mar 2023 11:41) (95% - 100%)    Parameters below as of 28 Mar 2023 11:41  Patient On (Oxygen Delivery Method): nasal cannula  O2 Flow (L/min): 2    CONSTITUTIONAL: NAD, well-groomed  EYES: PERRLA; conjunctiva and sclera clear  ENMT: Moist oral mucosa, no pharyngeal injection or exudates; normal dentition  NECK: Supple, no palpable masses; no thyromegaly  RESPIRATORY: Normal respiratory effort; lungs are clear to auscultation bilaterally  CARDIOVASCULAR: normal S1 and S2, no murmur/rub/gallop; No lower extremity edema  ABDOMEN: Nontender to palpation, normoactive bowel sounds, no rebound/guarding; No hepatosplenomegaly  MUSCULOSKELETAL:  no clubbing or cyanosis of digits; no joint swelling or tenderness to palpation  PSYCH: A+O to person, place, and time; affect appropriate  NEUROLOGY: CN 2-12 are intact and symmetric; no gross sensory deficits   SKIN: No rashes; no palpable lesions    LABS:    03-28    138  |  86<L>  |  47<H>  ----------------------------<  242<H>  4.3   |  39<H>  |  1.77<H>    Ca    9.3      28 Mar 2023 10:30  Phos  2.8     03-26  Mg     1.7     03-26                SARS-CoV-2: NotDetec (22 Mar 2023 18:43)  SARS-CoV-2: NotDetec (06 Feb 2023 13:12)  SARS-CoV-2: NotDetec (01 Feb 2023 11:11)  COVID-19 PCR: NotDetec (29 Jan 2023 07:38)  SARS-CoV-2: NotDetec (23 Jan 2023 07:26)        RADIOLOGY & ADDITIONAL TESTS:  New Imaging Personally Reviewed Today: I have personally performed a POCUS exam, found lungs with A lines, no B lines present. cardiac exam without RV dilation, IVC collapsable     COMMUNICATION:  Care Discussed with Consultants/Other Providers and Details of Discussion: Case d/w ACP  Discussions with Patient/Family: Case discussed with daughter  PCP Communication:

## 2023-03-29 ENCOUNTER — TRANSCRIPTION ENCOUNTER (OUTPATIENT)
Age: 83
End: 2023-03-29

## 2023-03-29 ENCOUNTER — APPOINTMENT (OUTPATIENT)
Dept: GASTROENTEROLOGY | Facility: HOSPITAL | Age: 83
End: 2023-03-29

## 2023-03-29 LAB
ANION GAP SERPL CALC-SCNC: 10 MMOL/L — SIGNIFICANT CHANGE UP (ref 5–17)
ANION GAP SERPL CALC-SCNC: 8 MMOL/L — SIGNIFICANT CHANGE UP (ref 5–17)
BASE EXCESS BLDV CALC-SCNC: 16.9 MMOL/L — HIGH (ref -2–3)
BUN SERPL-MCNC: 54 MG/DL — HIGH (ref 7–23)
BUN SERPL-MCNC: 55 MG/DL — HIGH (ref 7–23)
CA-I SERPL-SCNC: 1.21 MMOL/L — SIGNIFICANT CHANGE UP (ref 1.15–1.33)
CALCIUM SERPL-MCNC: 9.1 MG/DL — SIGNIFICANT CHANGE UP (ref 8.4–10.5)
CALCIUM SERPL-MCNC: 9.4 MG/DL — SIGNIFICANT CHANGE UP (ref 8.4–10.5)
CHLORIDE BLDV-SCNC: 93 MMOL/L — LOW (ref 96–108)
CHLORIDE SERPL-SCNC: 90 MMOL/L — LOW (ref 96–108)
CHLORIDE SERPL-SCNC: 90 MMOL/L — LOW (ref 96–108)
CO2 BLDV-SCNC: 48 MMOL/L — HIGH (ref 22–26)
CO2 SERPL-SCNC: 40 MMOL/L — HIGH (ref 22–31)
CO2 SERPL-SCNC: 42 MMOL/L — HIGH (ref 22–31)
CREAT SERPL-MCNC: 1.83 MG/DL — HIGH (ref 0.5–1.3)
CREAT SERPL-MCNC: 1.87 MG/DL — HIGH (ref 0.5–1.3)
EGFR: 26 ML/MIN/1.73M2 — LOW
EGFR: 27 ML/MIN/1.73M2 — LOW
GAS PNL BLDV: 137 MMOL/L — SIGNIFICANT CHANGE UP (ref 136–145)
GAS PNL BLDV: SIGNIFICANT CHANGE UP
GLUCOSE BLDC GLUCOMTR-MCNC: 110 MG/DL — HIGH (ref 70–99)
GLUCOSE BLDC GLUCOMTR-MCNC: 213 MG/DL — HIGH (ref 70–99)
GLUCOSE BLDC GLUCOMTR-MCNC: 275 MG/DL — HIGH (ref 70–99)
GLUCOSE BLDC GLUCOMTR-MCNC: 305 MG/DL — HIGH (ref 70–99)
GLUCOSE BLDV-MCNC: 157 MG/DL — HIGH (ref 70–99)
GLUCOSE SERPL-MCNC: 113 MG/DL — HIGH (ref 70–99)
GLUCOSE SERPL-MCNC: 158 MG/DL — HIGH (ref 70–99)
HCO3 BLDV-SCNC: 46 MMOL/L — CRITICAL HIGH (ref 22–29)
HCT VFR BLDA CALC: 33 % — LOW (ref 34.5–46.5)
HGB BLD CALC-MCNC: 11.1 G/DL — LOW (ref 11.7–16.1)
LACTATE BLDV-MCNC: 1.5 MMOL/L — SIGNIFICANT CHANGE UP (ref 0.5–2)
MAGNESIUM SERPL-MCNC: 1.8 MG/DL — SIGNIFICANT CHANGE UP (ref 1.6–2.6)
MAGNESIUM SERPL-MCNC: 1.9 MG/DL — SIGNIFICANT CHANGE UP (ref 1.6–2.6)
NT-PROBNP SERPL-SCNC: 1330 PG/ML — HIGH (ref 0–300)
NT-PROBNP SERPL-SCNC: 1502 PG/ML — HIGH (ref 0–300)
PCO2 BLDV: 81 MMHG — HIGH (ref 39–42)
PH BLDV: 7.36 — SIGNIFICANT CHANGE UP (ref 7.32–7.43)
PHOSPHATE SERPL-MCNC: 3.8 MG/DL — SIGNIFICANT CHANGE UP (ref 2.5–4.5)
PHOSPHATE SERPL-MCNC: 4.1 MG/DL — SIGNIFICANT CHANGE UP (ref 2.5–4.5)
PO2 BLDV: 72 MMHG — HIGH (ref 25–45)
POTASSIUM BLDV-SCNC: 3.5 MMOL/L — SIGNIFICANT CHANGE UP (ref 3.5–5.1)
POTASSIUM SERPL-MCNC: 3.3 MMOL/L — LOW (ref 3.5–5.3)
POTASSIUM SERPL-MCNC: 3.6 MMOL/L — SIGNIFICANT CHANGE UP (ref 3.5–5.3)
POTASSIUM SERPL-SCNC: 3.3 MMOL/L — LOW (ref 3.5–5.3)
POTASSIUM SERPL-SCNC: 3.6 MMOL/L — SIGNIFICANT CHANGE UP (ref 3.5–5.3)
SAO2 % BLDV: 94.1 % — HIGH (ref 67–88)
SODIUM SERPL-SCNC: 140 MMOL/L — SIGNIFICANT CHANGE UP (ref 135–145)
SODIUM SERPL-SCNC: 140 MMOL/L — SIGNIFICANT CHANGE UP (ref 135–145)

## 2023-03-29 PROCEDURE — 99239 HOSP IP/OBS DSCHRG MGMT >30: CPT

## 2023-03-29 RX ORDER — ACETAZOLAMIDE 250 MG/1
250 TABLET ORAL ONCE
Refills: 0 | Status: COMPLETED | OUTPATIENT
Start: 2023-03-29 | End: 2023-03-29

## 2023-03-29 RX ORDER — ACETAZOLAMIDE 250 MG/1
500 TABLET ORAL EVERY 12 HOURS
Refills: 0 | Status: DISCONTINUED | OUTPATIENT
Start: 2023-03-29 | End: 2023-03-29

## 2023-03-29 RX ORDER — ACETAZOLAMIDE 250 MG/1
250 TABLET ORAL ONCE
Refills: 0 | Status: DISCONTINUED | OUTPATIENT
Start: 2023-03-29 | End: 2023-03-29

## 2023-03-29 RX ORDER — POTASSIUM CHLORIDE 20 MEQ
40 PACKET (EA) ORAL ONCE
Refills: 0 | Status: COMPLETED | OUTPATIENT
Start: 2023-03-29 | End: 2023-03-29

## 2023-03-29 RX ADMIN — Medication 3 MILLILITER(S): at 03:57

## 2023-03-29 RX ADMIN — Medication 40 MILLIEQUIVALENT(S): at 17:22

## 2023-03-29 RX ADMIN — Medication 4: at 08:50

## 2023-03-29 RX ADMIN — Medication 100 MILLIGRAM(S): at 05:37

## 2023-03-29 RX ADMIN — GABAPENTIN 200 MILLIGRAM(S): 400 CAPSULE ORAL at 21:41

## 2023-03-29 RX ADMIN — Medication 3 MILLILITER(S): at 13:15

## 2023-03-29 RX ADMIN — ACETAZOLAMIDE 250 MILLIGRAM(S): 250 TABLET ORAL at 05:37

## 2023-03-29 RX ADMIN — Medication 7 UNIT(S): at 18:33

## 2023-03-29 RX ADMIN — PANTOPRAZOLE SODIUM 40 MILLIGRAM(S): 20 TABLET, DELAYED RELEASE ORAL at 05:37

## 2023-03-29 RX ADMIN — ATORVASTATIN CALCIUM 40 MILLIGRAM(S): 80 TABLET, FILM COATED ORAL at 21:41

## 2023-03-29 RX ADMIN — INSULIN GLARGINE 21 UNIT(S): 100 INJECTION, SOLUTION SUBCUTANEOUS at 21:41

## 2023-03-29 RX ADMIN — APIXABAN 2.5 MILLIGRAM(S): 2.5 TABLET, FILM COATED ORAL at 05:37

## 2023-03-29 RX ADMIN — Medication 4 MILLIGRAM(S): at 06:33

## 2023-03-29 RX ADMIN — Medication 3: at 13:15

## 2023-03-29 RX ADMIN — APIXABAN 2.5 MILLIGRAM(S): 2.5 TABLET, FILM COATED ORAL at 17:23

## 2023-03-29 RX ADMIN — Medication 7 UNIT(S): at 08:53

## 2023-03-29 RX ADMIN — Medication 7 UNIT(S): at 13:15

## 2023-03-29 RX ADMIN — Medication 3 MILLILITER(S): at 05:38

## 2023-03-29 RX ADMIN — Medication 81 MILLIGRAM(S): at 13:08

## 2023-03-29 RX ADMIN — Medication 3 MILLILITER(S): at 17:24

## 2023-03-29 RX ADMIN — Medication 100 MILLIGRAM(S): at 17:23

## 2023-03-29 RX ADMIN — Medication 40 MILLIEQUIVALENT(S): at 03:58

## 2023-03-29 RX ADMIN — PANTOPRAZOLE SODIUM 40 MILLIGRAM(S): 20 TABLET, DELAYED RELEASE ORAL at 17:22

## 2023-03-29 RX ADMIN — ACETAZOLAMIDE 250 MILLIGRAM(S): 250 TABLET ORAL at 13:08

## 2023-03-29 NOTE — PROVIDER CONTACT NOTE (OTHER) - SITUATION
Patient is refusing to be discharged. Patient and family is requesting to speak to provider and be reassessed by PT prior to being discharged.
notified by Tech patient had event of PAT for 2 sex HR up to 153
Notified by tech patient had event of 5 beats WCT
NOtified by Tech patient had event of PAT for 7 sec HR up to 127
Notified provider that I d/c'd the Left IV due to redness and edema above the site.
On assessment patient complains of "bilateral ear discharge for 3 days".
notified by tele of patient event PAT for 10 sex HR up to 143.

## 2023-03-29 NOTE — PROVIDER CONTACT NOTE (OTHER) - RECOMMENDATIONS
give Metoprolol as scheduled
Monitor for discharge.
VS, continue to monitor
give medications
Provider recommended a hot pack over the site.
vital signs

## 2023-03-29 NOTE — DISCHARGE NOTE PROVIDER - NSDCCPCAREPLAN_GEN_ALL_CORE_FT
PRINCIPAL DISCHARGE DIAGNOSIS  Diagnosis: Acute CHF  Assessment and Plan of Treatment: Weigh yourself daily.  If you gain 3lbs in 3 days, or 5lbs in a week call your Health Care Provider.  Do not eat or drink foods containing more than 2000mg of salt (sodium) in your diet every day.  Call your Health Care Provider if you have any swelling or increased swelling in your feet, ankles, and/or stomach.  Take all of your medication as directed.  If you become dizzy call your Health Care Provider.      SECONDARY DISCHARGE DIAGNOSES  Diagnosis: Paroxysmal atrial fibrillation  Assessment and Plan of Treatment: Take your "blood thinners" as prescribed.  Walking is encouraged, increase activity as tolerated.  If you develop new leg pain, swelling, and/or redness contact your healthcare provider.  If you develop new chest pain with difficulty breathing, a rapid heart rate and/or a feeling of passing out call emergency medical services 911.     PRINCIPAL DISCHARGE DIAGNOSIS  Diagnosis: Acute CHF  Assessment and Plan of Treatment: Weigh yourself daily.  If you gain 3lbs in 3 days, or 5lbs in a week call your Health Care Provider.  Do not eat or drink foods containing more than 2000mg of salt (sodium) in your diet every day.  Call your Health Care Provider if you have any swelling or increased swelling in your feet, ankles, and/or stomach.  Take all of your medication as directed.  If you become dizzy call your Health Care Provider.   Follow up with cardiologist Dr. Hagan early next week.    Follow up with nephrologist Dr. Kan in 2 weeks.   Follow up with your PCP Dr. Espinoza in 1-2 weeks.      SECONDARY DISCHARGE DIAGNOSES  Diagnosis: Paroxysmal atrial fibrillation  Assessment and Plan of Treatment: Take your "blood thinners" as prescribed.  Walking is encouraged, increase activity as tolerated.  If you develop new leg pain, swelling, and/or redness contact your healthcare provider.  If you develop new chest pain with difficulty breathing, a rapid heart rate and/or a feeling of passing out call emergency medical services 911.     PRINCIPAL DISCHARGE DIAGNOSIS  Diagnosis: Acute CHF  Assessment and Plan of Treatment: Weigh yourself daily.  If you gain 3lbs in 3 days, or 5lbs in a week call your Health Care Provider.  Do not eat or drink foods containing more than 2000mg of salt (sodium) in your diet every day.  Call your Health Care Provider if you have any swelling or increased swelling in your feet, ankles, and/or stomach.  Take all of your medication as directed.  If you become dizzy call your Health Care Provider.  **PLEASE DO NOT TAKE BUMEX FOR 2 DAYS AFTER DISCHARGE. AFTER 2 DAYS, PLEASE RESUME TAKING BUMEX.    Follow up with cardiologist Dr. Hagan early next week.    Follow up with nephrologist Dr. Kan in 2 weeks.   Follow up with your PCP Dr. Espinoza in 1-2 weeks.      SECONDARY DISCHARGE DIAGNOSES  Diagnosis: Paroxysmal atrial fibrillation  Assessment and Plan of Treatment: Take your "blood thinners" as prescribed.  Walking is encouraged, increase activity as tolerated.  If you develop new leg pain, swelling, and/or redness contact your healthcare provider.  If you develop new chest pain with difficulty breathing, a rapid heart rate and/or a feeling of passing out call emergency medical services 911.   Follow up with cardiologist Dr. Hagan early next week.   Follow up with your PCP Dr. Espinoza in 1-2 weeks.     PRINCIPAL DISCHARGE DIAGNOSIS  Diagnosis: Acute CHF  Assessment and Plan of Treatment: Weigh yourself daily.  If you gain 3lbs in 3 days, or 5lbs in a week call your Health Care Provider.  Do not eat or drink foods containing more than 2000mg of salt (sodium) in your diet every day.  Call your Health Care Provider if you have any swelling or increased swelling in your feet, ankles, and/or stomach.  Take all of your medication as directed.  If you become dizzy call your Health Care Provider.  **PLEASE DO NOT TAKE BUMEX FOR 2 DAYS AFTER DISCHARGE. AFTER 2 DAYS, PLEASE RESUME TAKING BUMEX.    Follow up with cardiologist Dr. Hagan early next week.    Follow up with nephrologist Dr. Kan in 2 weeks.   Follow up with your PCP Dr. Espinoza in 1-2 weeks.      SECONDARY DISCHARGE DIAGNOSES  Diagnosis: Metabolic alkalosis  Assessment and Plan of Treatment: You presented with metabolic alkalosis and were treated with IV diamox.   Follow up with cardiologist Dr. Hagan early next week.   Follow up with nephrologist Dr. Kan in 2 weeks.   Follow up with your PCP Dr. Espinoza in 1-2 weeks.    Diagnosis: Paroxysmal atrial fibrillation  Assessment and Plan of Treatment: Take your "blood thinners" as prescribed.  Walking is encouraged, increase activity as tolerated.  If you develop new leg pain, swelling, and/or redness contact your healthcare provider.  If you develop new chest pain with difficulty breathing, a rapid heart rate and/or a feeling of passing out call emergency medical services 911.   Follow up with cardiologist Dr. Hagan early next week.   Follow up with your PCP Dr. Espinoza in 1-2 weeks.    Diagnosis: Abdominal pain  Assessment and Plan of Treatment: Follow up with gastroenterologist Dr. Blanchard for outpatient ERCP.     PRINCIPAL DISCHARGE DIAGNOSIS  Diagnosis: Acute CHF  Assessment and Plan of Treatment: Weigh yourself daily.  If you gain 3lbs in 3 days, or 5lbs in a week call your Health Care Provider.  Do not eat or drink foods containing more than 2000mg of salt (sodium) in your diet every day.  Call your Health Care Provider if you have any swelling or increased swelling in your feet, ankles, and/or stomach.  Take all of your medication as directed.  If you become dizzy call your Health Care Provider.  **PLEASE DO NOT TAKE BUMEX FOR 2 DAYS AFTER DISCHARGE. AFTER 2 DAYS, PLEASE RESUME TAKING BUMEX.    Follow up with cardiologist Dr. Hagan early next week.    Follow up with nephrologist Dr. Kan in 2 weeks.   Follow up with your PCP Dr. Espinoza in 1-2 weeks.        SECONDARY DISCHARGE DIAGNOSES  Diagnosis: Metabolic alkalosis  Assessment and Plan of Treatment: You presented with metabolic alkalosis and were treated with IV diamox.   Follow up with cardiologist Dr. Hagan early next week.   Follow up with nephrologist Dr. Kan in 2 weeks.   Follow up with your PCP Dr. Espinoza in 1-2 weeks.    Diagnosis: Paroxysmal atrial fibrillation  Assessment and Plan of Treatment: Take your "blood thinners" as prescribed.  Walking is encouraged, increase activity as tolerated.  If you develop new leg pain, swelling, and/or redness contact your healthcare provider.  If you develop new chest pain with difficulty breathing, a rapid heart rate and/or a feeling of passing out call emergency medical services 911.   Follow up with cardiologist Dr. Hagan early next week.   Follow up with your PCP Dr. Espinoza in 1-2 weeks.    Diagnosis: Abdominal pain  Assessment and Plan of Treatment: Follow up with gastroenterologist Dr. Blanchard for outpatient ERCP.

## 2023-03-29 NOTE — DISCHARGE NOTE NURSING/CASE MANAGEMENT/SOCIAL WORK - NSDCFUADDAPPT_GEN_ALL_CORE_FT
APPTS ARE READY TO BE MADE: [ X] YES    Best Family or Patient Contact (if needed):    Additional Information about above appointments (if needed):    1: Follow up with cardiologist Dr. Hagan early next week.   2: Follow up with nephrologist Dr. Kan in 2 weeks.   3: Follow up with your PCP Dr. Espinoza in 1-2 weeks.   4: Follow up with gastroenterologist Dr. Blanchard for outpatient ERCP.     Other comments or requests:

## 2023-03-29 NOTE — DISCHARGE NOTE NURSING/CASE MANAGEMENT/SOCIAL WORK - PATIENT PORTAL LINK FT
You can access the FollowMyHealth Patient Portal offered by [facility] by registering at the following website: [email or web address]. By joining [employer]’s FollowMyHealth portal, you will also be able to view your health information using other applications (apps) compatible with our system.

## 2023-03-29 NOTE — PROGRESS NOTE ADULT - SUBJECTIVE AND OBJECTIVE BOX
DATE OF SERVICE: 03-29-23 @ 14:25    Patient is a 83y old  Female who presents with a chief complaint of shortness of breath (29 Mar 2023 11:17)      INTERVAL HISTORY: Feels ok.     REVIEW OF SYSTEMS:  CONSTITUTIONAL: No weakness  EYES/ENT: No visual changes;  No throat pain   NECK: No pain or stiffness  RESPIRATORY: No cough, wheezing; No shortness of breath  CARDIOVASCULAR: No chest pain or palpitations  GASTROINTESTINAL: No abdominal  pain. No nausea, vomiting, or hematemesis  GENITOURINARY: No dysuria, frequency or hematuria  NEUROLOGICAL: No stroke like symptoms  SKIN: No rashes    TELEMETRY Personally reviewed: SB/SR 50-60s  	  MEDICATIONS:  metoprolol tartrate 100 milliGRAM(s) Oral every 12 hours        PHYSICAL EXAM:  T(C): 36.3 (03-29-23 @ 04:00), Max: 36.7 (03-28-23 @ 19:00)  HR: 63 (03-29-23 @ 04:00) (63 - 73)  BP: 129/70 (03-29-23 @ 04:00) (129/70 - 138/77)  RR: 18 (03-29-23 @ 04:00) (18 - 18)  SpO2: 100% (03-29-23 @ 04:00) (91% - 100%)  Wt(kg): --  I&O's Summary    28 Mar 2023 07:01  -  29 Mar 2023 07:00  --------------------------------------------------------  IN: 1060 mL / OUT: 1200 mL / NET: -140 mL          Appearance: In no distress	  HEENT:    PERRL, EOMI	  Cardiovascular:  S1 S2, No JVD  Respiratory: Lungs clear to auscultation	  Gastrointestinal:  Soft, Non-tender, + BS	  Vascularature:  No edema of LE  Psychiatric: Appropriate affect   Neuro: no acute focal deficits           03-29    140  |  90<L>  |  54<H>  ----------------------------<  158<H>  3.6   |  42<H>  |  1.87<H>    Ca    9.4      29 Mar 2023 06:35  Phos  4.1     03-29  Mg     1.9     03-29          Labs personally reviewed      ASSESSMENT/PLAN: 	    83F w/ PMHx HTN, HLD, DM2, HFpEF (EF 65%), CAD s/p CABG, Breast CA s/p R partial mastectomy, rectal CA s/p resection with recent admission for acute rissa s/p ERCP w/ stent c/b gallbladder perf w/ perc rissa and recurrent SVT presenting for 1 week of shortness of breath and fluid retention. Patient has been at [facility] since her discharge from the hospital on 2/7/23. About a week ago she woke up in the middle of the night short of breath which continued to persist. She additionally complaints of epigastric burning sensation and early satiety. At [facility] she was placed on oxygen with minimal improvement in respiratory status, also received additional lasix 40 and metolazone today prior to coming to the ED. Denies CP, palpitations or syncope.    Problem/Plan -1  Problem: Acute on Chronic Diastolic CHF  - Patient with severe pulm pressures on echo  - CXR and CT chest with pulm edema  - proBNP 3205  - Repeat TTE shows preserved EF, hyperdynamic LV function, basal inferoseptum hypokinesis, decreased RV systolic function severe pulm pressures, B/L pleural effusions  - 3/27 Orthostatic. Appears near euvolemic. Repeat proBNP in AM  - Ok to cont to hold Bumex for now.  - bedside US with no pulm edema, small IVC with contraction alkalosis - s/p Diamox  - Resume Bumex 1mg PO daily upon DC. Close OP follow up with Dr. Hagan.     Problem/Plan -2  Problem: CAD   - s/p CABG 2020  - ECG with no ischemia noted  - Trop 21  - c/w ASA, metoprolol and statin  - TTE shows basal inferoseptum hypokinesis  - c/o chronic epigastric burning most likely noncardiac in nature given patient's recent cholecystitis     Problem/Plan -3  Problem: hx of Recurrent SVT  - c/w Metoprolol 100mg PO BID  - cont to monitor on tele    Problem/Plan -4  Problem: Atrial Fibrillation  - Patient with n/o A-flutter on previous admission  - c/w Metoprolol   - c/w eliquis 2.5mg PO BID          MIKE Smith DO [facility]  Cardiovascular Medicine  [address], [address]  Available through call or text on [employer] TE[employer]  Office: [phone]   DATE OF SERVICE: 03-29-23 @ 14:25    Patient is a 83y old  Female who presents with a chief complaint of shortness of breath (29 Mar 2023 11:17)      INTERVAL HISTORY: Feels ok.     REVIEW OF SYSTEMS:  CONSTITUTIONAL: No weakness  EYES/ENT: No visual changes;  No throat pain   NECK: No pain or stiffness  RESPIRATORY: No cough, wheezing; No shortness of breath  CARDIOVASCULAR: No chest pain or palpitations  GASTROINTESTINAL: No abdominal  pain. No nausea, vomiting, or hematemesis  GENITOURINARY: No dysuria, frequency or hematuria  NEUROLOGICAL: No stroke like symptoms  SKIN: No rashes    TELEMETRY Personally reviewed: SB/SR 50-60s  	  MEDICATIONS:  metoprolol tartrate 100 milliGRAM(s) Oral every 12 hours        PHYSICAL EXAM:  T(C): 36.3 (03-29-23 @ 04:00), Max: 36.7 (03-28-23 @ 19:00)  HR: 63 (03-29-23 @ 04:00) (63 - 73)  BP: 129/70 (03-29-23 @ 04:00) (129/70 - 138/77)  RR: 18 (03-29-23 @ 04:00) (18 - 18)  SpO2: 100% (03-29-23 @ 04:00) (91% - 100%)  Wt(kg): --  I&O's Summary    28 Mar 2023 07:01  -  29 Mar 2023 07:00  --------------------------------------------------------  IN: 1060 mL / OUT: 1200 mL / NET: -140 mL          Appearance: In no distress	  HEENT:    PERRL, EOMI	  Cardiovascular:  S1 S2, No JVD  Respiratory: Lungs clear to auscultation	  Gastrointestinal:  Soft, Non-tender, + BS	  Vascularature:  No edema of LE  Psychiatric: Appropriate affect   Neuro: no acute focal deficits           03-29    140  |  90<L>  |  54<H>  ----------------------------<  158<H>  3.6   |  42<H>  |  1.87<H>    Ca    9.4      29 Mar 2023 06:35  Phos  4.1     03-29  Mg     1.9     03-29          Labs personally reviewed      ASSESSMENT/PLAN: 	    83F w/ PMHx HTN, HLD, DM2, HFpEF (EF 65%), CAD s/p CABG, Breast CA s/p R partial mastectomy, rectal CA s/p resection with recent admission for acute rissa s/p ERCP w/ stent c/b gallbladder perf w/ perc rissa and recurrent SVT presenting for 1 week of shortness of breath and fluid retention. Patient has been at [facility] since her discharge from the hospital on 2/7/23. About a week ago she woke up in the middle of the night short of breath which continued to persist. She additionally complaints of epigastric burning sensation and early satiety. At [facility] she was placed on oxygen with minimal improvement in respiratory status, also received additional lasix 40 and metolazone today prior to coming to the ED. Denies CP, palpitations or syncope.    Problem/Plan -1  Problem: Acute on Chronic Diastolic CHF  - Patient with severe pulm pressures on echo  - CXR and CT chest with pulm edema  - proBNP 3205  - Repeat TTE shows preserved EF, hyperdynamic LV function, basal inferoseptum hypokinesis, decreased RV systolic function severe pulm pressures, B/L pleural effusions  - 3/27 Orthostatic. Appears near euvolemic. Repeat proBNP in AM  - Ok to cont to hold Bumex for now.  - bedside US with no pulm edema, small IVC with contraction alkalosis - s/p Diamox yesterday and again today.  - Resume Bumex 1mg PO daily 2 days after dcischarge. Close OP follow up with Dr. Hagan next visit    Problem/Plan -2  Problem: CAD   - s/p CABG 2020  - ECG with no ischemia noted  - Trop 21  - c/w ASA, metoprolol and statin  - TTE shows basal inferoseptum hypokinesis  - c/o chronic epigastric burning most likely noncardiac in nature given patient's recent cholecystitis     Problem/Plan -3  Problem: hx of Recurrent SVT  - c/w Metoprolol 100mg PO BID  - cont to monitor on tele    Problem/Plan -4  Problem: Atrial Fibrillation  - Patient with n/o A-flutter on previous admission  - c/w Metoprolol   - c/w eliquis 2.5mg PO BID          Delaney Vasquez, AG-NP   Sergey Winchester DO [facility]  Cardiovascular Medicine  [address] [place] [address], [address]  Available through call or text on [employer] TE[employer]  Office: [phone]

## 2023-03-29 NOTE — DISCHARGE NOTE PROVIDER - NPI NUMBER (FOR SYSADMIN USE ONLY) :
[[id number]] [[id number]] [[id number]]

## 2023-03-29 NOTE — PROVIDER CONTACT NOTE (OTHER) - ASSESSMENT
Patient A&Ox4, RA, VSS, crying in room and is refusing to be discharged.
Patient Left IV access removed. Patient has no c/o of pain or discomfort.
Spanish speaking,  services used. On 1500 mL fluid restriction. VSS, RA. R arm precautions. R arm lymphedema. Hx of Breast CA w/ mastectomy.    No discharge present at time of assessment.
patient asleep but arousable no complaints offered
patient asleep but easily arousable. asymptomatic. vs WNL
patient asleep, easy to arouse,  /55 HR 71, o2 sat 99% on 3l. no complaints of chest pain or SOB.
patient asleep by arousable, asymptomatic, VS WNL

## 2023-03-29 NOTE — PROGRESS NOTE ADULT - ASSESSMENT
albuterol/ipratropium for Nebulization 3 milliLiter(s) Nebulizer every 6 hours  apixaban 2.5 milliGRAM(s) Oral every 12 hours  aspirin  chewable 81 milliGRAM(s) Oral daily  atorvastatin 40 milliGRAM(s) Oral at bedtime  buMETAnide Infusion 1 mG/Hr IV Continuous <Continuous>  dextrose 5%. 1000 milliLiter(s) IV Continuous <Continuous>  dextrose 5%. 1000 milliLiter(s) IV Continuous <Continuous>  dextrose 50% Injectable 25 Gram(s) IV Push once  dextrose 50% Injectable 12.5 Gram(s) IV Push once  dextrose 50% Injectable 25 Gram(s) IV Push once  dextrose Oral Gel 15 Gram(s) Oral once PRN  gabapentin 200 milliGRAM(s) Oral at bedtime  glucagon  Injectable 1 milliGRAM(s) IntraMuscular once  insulin glargine Injectable (LANTUS) 21 Unit(s) SubCutaneous at bedtime  insulin lispro (ADMELOG) corrective regimen sliding scale   SubCutaneous three times a day before meals  insulin lispro (ADMELOG) corrective regimen sliding scale   SubCutaneous at bedtime  insulin lispro Injectable (ADMELOG) 7 Unit(s) SubCutaneous three times a day before meals  methylPREDNISolone   Oral   methylPREDNISolone 4 milliGRAM(s) Oral before breakfast  methylPREDNISolone 4 milliGRAM(s) Oral after lunch  methylPREDNISolone 4 milliGRAM(s) Oral after dinner  methylPREDNISolone 4 milliGRAM(s) Oral at bedtime  metoprolol tartrate 100 milliGRAM(s) Oral every 12 hours  pantoprazole    Tablet 40 milliGRAM(s) Oral every 12 hours      VITAL:  T(C): , Max: 36.8 (03-25-23 @ 17:51)  T(F): , Max: 98.2 (03-25-23 @ 17:51)  HR: 73 (03-26-23 @ 12:20)  BP: 106/68 (03-26-23 @ 12:20)  BP(mean): --  RR: 18 (03-26-23 @ 12:20)  SpO2: 97% (03-26-23 @ 12:20)  Wt(kg): --    03-25-23 @ 07:01  -  03-26-23 @ 07:00  --------------------------------------------------------  IN: 0 mL / OUT: 1900 mL / NET: -1900 mL    03-26-23 @ 07:01  -  03-26-23 @ 15:27  --------------------------------------------------------  IN: 460 mL / OUT: 600 mL / NET: -140 mL        PHYSICAL EXAM:  Constitutional: NAD  Respiratory: diminished at bases   Cardiovascular: S1 and S2, RRR  Gastrointestinal: + BS, soft, NT, ND  Extremities:   leg edema improved   Neurological: AAO x 3, CN 2-12 intact  : No Dowling      LABS:      Na(141)/K(4.2)/Cl(90)/HCO3(38)/BUN(28)/Cr(1.42)Glu(248)/Ca(9.2)/Mg(--)/PO4(--)    03-26 @ 06:49  Na(140)/K(4.3)/Cl(92)/HCO3(39)/BUN(26)/Cr(1.44)Glu(246)/Ca(9.0)/Mg(1.8)/PO4(3.4)    03-26 @ 03:55  Na(149)/K(4.7)/Cl(89)/HCO3(38)/BUN(21)/Cr(1.26)Glu(235)/Ca(9.3)/Mg(1.9)/PO4(2.7)    03-25 @ 12:03  Na(141)/K(3.8)/Cl(97)/HCO3(35)/BUN(19)/Cr(1.28)Glu(176)/Ca(8.8)/Mg(1.9)/PO4(3.3)    03-24 @ 12:37  Na(141)/K(3.6)/Cl(96)/HCO3(36)/BUN(21)/Cr(1.30)Glu(124)/Ca(8.9)/Mg(1.7)/PO4(2.5)    03-23 @ 17:36            ASSESSMENT/PLAN  ASSESSMENT:    Patient is a 80 year old F with a PMHx of HTN, HLD, T2DM, HFpEF (EF 65% X/2022), breast cancer s/p R partial mastectomy, rectal carcinoma s/p resection, CAD s/p CABG (11/2020) recent ERCP , with cholecystotomy tube placement  who presents with sob  home bumex 1 mg PO     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,  nephrology aware , previous diuretic test showed minimal  function of that kidney)  CVS- Bp controlled at present  hypervolumic - improving  Electrolytes - controlled  Anemia- hgb improving(3/25)  recent vbg resp alkalosis with chronic metabolic   GI --- s/p  ERCP not a surgical candidate for cholecystotomy  on previous admission     RECOMMENDATION:  1)Renal:  scr is pretty much stable  orthostatic negative Sp diamox dose  cont holding bumex , upon dc will resume in after 2 days   keep k ~4, mag 2   avoid nephrotoxic medication  dose all medications for gfr 30-35   ml/min  daily bmp , mag and Po4   renal diet   monitor I/Os  2)CVS: continue BP meds as ordered for now        d-w APC   Brenda Rader  [facility]  Office: [phone]       albuterol/ipratropium for Nebulization 3 milliLiter(s) Nebulizer every 6 hours  apixaban 2.5 milliGRAM(s) Oral every 12 hours  aspirin  chewable 81 milliGRAM(s) Oral daily  atorvastatin 40 milliGRAM(s) Oral at bedtime  buMETAnide Infusion 1 mG/Hr IV Continuous <Continuous>  dextrose 5%. 1000 milliLiter(s) IV Continuous <Continuous>  dextrose 5%. 1000 milliLiter(s) IV Continuous <Continuous>  dextrose 50% Injectable 25 Gram(s) IV Push once  dextrose 50% Injectable 12.5 Gram(s) IV Push once  dextrose 50% Injectable 25 Gram(s) IV Push once  dextrose Oral Gel 15 Gram(s) Oral once PRN  gabapentin 200 milliGRAM(s) Oral at bedtime  glucagon  Injectable 1 milliGRAM(s) IntraMuscular once  insulin glargine Injectable (LANTUS) 21 Unit(s) SubCutaneous at bedtime  insulin lispro (ADMELOG) corrective regimen sliding scale   SubCutaneous three times a day before meals  insulin lispro (ADMELOG) corrective regimen sliding scale   SubCutaneous at bedtime  insulin lispro Injectable (ADMELOG) 7 Unit(s) SubCutaneous three times a day before meals  methylPREDNISolone   Oral   methylPREDNISolone 4 milliGRAM(s) Oral before breakfast  methylPREDNISolone 4 milliGRAM(s) Oral after lunch  methylPREDNISolone 4 milliGRAM(s) Oral after dinner  methylPREDNISolone 4 milliGRAM(s) Oral at bedtime  metoprolol tartrate 100 milliGRAM(s) Oral every 12 hours  pantoprazole    Tablet 40 milliGRAM(s) Oral every 12 hours      VITAL:  T(C): , Max: 36.8 (03-25-23 @ 17:51)  T(F): , Max: 98.2 (03-25-23 @ 17:51)  HR: 73 (03-26-23 @ 12:20)  BP: 106/68 (03-26-23 @ 12:20)  BP(mean): --  RR: 18 (03-26-23 @ 12:20)  SpO2: 97% (03-26-23 @ 12:20)  Wt(kg): --    03-25-23 @ 07:01  -  03-26-23 @ 07:00  --------------------------------------------------------  IN: 0 mL / OUT: 1900 mL / NET: -1900 mL    03-26-23 @ 07:01  -  03-26-23 @ 15:27  --------------------------------------------------------  IN: 460 mL / OUT: 600 mL / NET: -140 mL        PHYSICAL EXAM:  Constitutional: NAD  Respiratory: diminished at bases   Cardiovascular: S1 and S2, RRR  Gastrointestinal: + BS, soft, NT, ND  Extremities:   leg edema improved   Neurological: AAO x 3, CN 2-12 intact  : No Dowling      LABS:      Na(141)/K(4.2)/Cl(90)/HCO3(38)/BUN(28)/Cr(1.42)Glu(248)/Ca(9.2)/Mg(--)/PO4(--)    03-26 @ 06:49  Na(140)/K(4.3)/Cl(92)/HCO3(39)/BUN(26)/Cr(1.44)Glu(246)/Ca(9.0)/Mg(1.8)/PO4(3.4)    03-26 @ 03:55  Na(149)/K(4.7)/Cl(89)/HCO3(38)/BUN(21)/Cr(1.26)Glu(235)/Ca(9.3)/Mg(1.9)/PO4(2.7)    03-25 @ 12:03  Na(141)/K(3.8)/Cl(97)/HCO3(35)/BUN(19)/Cr(1.28)Glu(176)/Ca(8.8)/Mg(1.9)/PO4(3.3)    03-24 @ 12:37  Na(141)/K(3.6)/Cl(96)/HCO3(36)/BUN(21)/Cr(1.30)Glu(124)/Ca(8.9)/Mg(1.7)/PO4(2.5)    03-23 @ 17:36            ASSESSMENT/PLAN  ASSESSMENT:    Patient is a 80 year old F with a PMHx of HTN, HLD, T2DM, HFpEF (EF 65% X/2022), breast cancer s/p R partial mastectomy, rectal carcinoma s/p resection, CAD s/p CABG (11/2020) recent ERCP , with cholecystotomy tube placement  who presents with sob  home bumex 1 mg PO     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,  nephrology aware , previous diuretic test showed minimal  function of that kidney)  CVS- Bp controlled at present  hypervolumic - improving  Electrolytes - controlled  Anemia- hgb improving(3/25)  recent vbg resp alkalosis with chronic metabolic   GI --- s/p  ERCP not a surgical candidate for cholecystotomy  on previous admission     RECOMMENDATION:  1)Renal:  scr is pretty much stable  orthostatic negative Sp diamox dose  cont holding bumex , upon dc will resume in after 2 days   keep k ~4, mag 2   avoid nephrotoxic medication  dose all medications for gfr 30-35   ml/min  daily bmp , mag and Po4   renal diet   monitor I/Os  2)CVS: continue BP meds as ordered for now  upon discharge will follow with Dr Kan .      sara GONZALES   Brenda sindy  [facility]  Office: [phone]       no distress  sob improved         albuterol/ipratropium for Nebulization 3 milliLiter(s) Nebulizer every 6 hours  apixaban 2.5 milliGRAM(s) Oral every 12 hours  aspirin  chewable 81 milliGRAM(s) Oral daily  atorvastatin 40 milliGRAM(s) Oral at bedtime  buMETAnide Infusion 1 mG/Hr IV Continuous <Continuous>  dextrose 5%. 1000 milliLiter(s) IV Continuous <Continuous>  dextrose 5%. 1000 milliLiter(s) IV Continuous <Continuous>  dextrose 50% Injectable 25 Gram(s) IV Push once  dextrose 50% Injectable 12.5 Gram(s) IV Push once  dextrose 50% Injectable 25 Gram(s) IV Push once  dextrose Oral Gel 15 Gram(s) Oral once PRN  gabapentin 200 milliGRAM(s) Oral at bedtime  glucagon  Injectable 1 milliGRAM(s) IntraMuscular once  insulin glargine Injectable (LANTUS) 21 Unit(s) SubCutaneous at bedtime  insulin lispro (ADMELOG) corrective regimen sliding scale   SubCutaneous three times a day before meals  insulin lispro (ADMELOG) corrective regimen sliding scale   SubCutaneous at bedtime  insulin lispro Injectable (ADMELOG) 7 Unit(s) SubCutaneous three times a day before meals  methylPREDNISolone   Oral   methylPREDNISolone 4 milliGRAM(s) Oral before breakfast  methylPREDNISolone 4 milliGRAM(s) Oral after lunch  methylPREDNISolone 4 milliGRAM(s) Oral after dinner  methylPREDNISolone 4 milliGRAM(s) Oral at bedtime  metoprolol tartrate 100 milliGRAM(s) Oral every 12 hours  pantoprazole    Tablet 40 milliGRAM(s) Oral every 12 hours      VITAL:  T(C): , Max: 36.8 (03-25-23 @ 17:51)  T(F): , Max: 98.2 (03-25-23 @ 17:51)  HR: 73 (03-26-23 @ 12:20)  BP: 106/68 (03-26-23 @ 12:20)  BP(mean): --  RR: 18 (03-26-23 @ 12:20)  SpO2: 97% (03-26-23 @ 12:20)  Wt(kg): --    03-25-23 @ 07:01  -  03-26-23 @ 07:00  --------------------------------------------------------  IN: 0 mL / OUT: 1900 mL / NET: -1900 mL    03-26-23 @ 07:01  -  03-26-23 @ 15:27  --------------------------------------------------------  IN: 460 mL / OUT: 600 mL / NET: -140 mL        PHYSICAL EXAM:  Constitutional: NAD  Respiratory: diminished at bases   Cardiovascular: S1 and S2, RRR  Gastrointestinal: + BS, soft, NT, ND  Extremities:   leg edema improved   Neurological: AAO x 3, CN 2-12 intact  : No Dowling      LABS:      Na(141)/K(4.2)/Cl(90)/HCO3(38)/BUN(28)/Cr(1.42)Glu(248)/Ca(9.2)/Mg(--)/PO4(--)    03-26 @ 06:49  Na(140)/K(4.3)/Cl(92)/HCO3(39)/BUN(26)/Cr(1.44)Glu(246)/Ca(9.0)/Mg(1.8)/PO4(3.4)    03-26 @ 03:55  Na(149)/K(4.7)/Cl(89)/HCO3(38)/BUN(21)/Cr(1.26)Glu(235)/Ca(9.3)/Mg(1.9)/PO4(2.7)    03-25 @ 12:03  Na(141)/K(3.8)/Cl(97)/HCO3(35)/BUN(19)/Cr(1.28)Glu(176)/Ca(8.8)/Mg(1.9)/PO4(3.3)    03-24 @ 12:37  Na(141)/K(3.6)/Cl(96)/HCO3(36)/BUN(21)/Cr(1.30)Glu(124)/Ca(8.9)/Mg(1.7)/PO4(2.5)    03-23 @ 17:36            ASSESSMENT/PLAN  ASSESSMENT:    Patient is a 80 year old F with a PMHx of HTN, HLD, T2DM, HFpEF (EF 65% X/2022), breast cancer s/p R partial mastectomy, rectal carcinoma s/p resection, CAD s/p CABG (11/2020) recent ERCP , with cholecystotomy tube placement  who presents with sob  home bumex 1 mg PO     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,  nephrology aware , previous diuretic test showed minimal  function of that kidney)  CVS- Bp controlled at present  hypervolumic - improving  Electrolytes - controlled  Anemia- hgb improving(3/25)  recent vbg resp alkalosis with chronic metabolic   GI --- s/p  ERCP not a surgical candidate for cholecystotomy  on previous admission     RECOMMENDATION:  1)Renal:  scr is pretty much stable  orthostatic negative Sp diamox dose  cont holding bumex , upon dc will resume in after 2 days   keep k ~4, mag 2   avoid nephrotoxic medication  dose all medications for gfr 30-35   ml/min  daily bmp , mag and Po4   renal diet   monitor I/Os  2)CVS: continue BP meds as ordered for now  upon discharge will follow with Dr Kan .      sara GONZALES   [facility]  Office: [phone]

## 2023-03-29 NOTE — PROVIDER CONTACT NOTE (CRITICAL VALUE NOTIFICATION) - BACKGROUND
Pt comes in AHRF/CHF , currently no O2 requirements.
patient admitted for HF, diuresing on bumex drip

## 2023-03-29 NOTE — DISCHARGE NOTE PROVIDER - ATTENDING DISCHARGE PHYSICAL EXAMINATION:
PHYSICAL EXAM:  Constitutional: WDWN resting comfortably in bed; NAD  Head: NC/AT  Eyes: PERRL, EOMI, anicteric sclera  ENT: no nasal discharge; uvula midline, no oropharyngeal erythema or exudates; MMM  Neck: supple; no JVD or thyromegaly  Respiratory: CTA B/L; no W/R/R, no retractions  Cardiac: +S1/S2; RRR; no M/R/G; no LE edema  Gastrointestinal: abdomen soft, NT/ND; no rebound or guarding; +BSx4  Back: spine midline, no bony tenderness or step-offs; no CVAT B/L  Extremities: WWP, no clubbing or cyanosis  Musculoskeletal: NROM x4; no joint swelling, tenderness or erythema  Vascular: 2+ radial, femoral, DP/PT pulses B/L  Dermatologic: skin warm, dry and intact; no rashes, wounds, or scars  Lymphatic: no submandibular or cervical LAD  Neurologic: AAOx3; CNII-XII grossly intact; no focal deficits, Hungarian speaking   Psychiatric: affect and characteristics of appearance, verbalizations, behaviors are appropriate

## 2023-03-29 NOTE — PROGRESS NOTE ADULT - NS ATTEND AMEND GEN_ALL_CORE FT
Patient care and plan discussed and reviewed with Advanced Care Provider. Plan as outlined above edited by me to reflect our discussion.

## 2023-03-29 NOTE — PROVIDER CONTACT NOTE (OTHER) - ACTION/TREATMENT ORDERED:
No new orders, continue to monitor
Hot pack placed and patient continued sleeping. Patient has no complaints.
No new orders, continue to monitor patient.
ACP La aware. Will assess patient at bedside.
ACP Stella Gill aware. ACP will come assess situation at patient bedside.
Give Metropolol 100mg as scheduled.
Give medication as scheduled.

## 2023-03-29 NOTE — DISCHARGE NOTE PROVIDER - NSDCFUSCHEDAPPT_GEN_ALL_CORE_FT
[facility] Blanchard  [facility]  Scheduled Appointment: 03/29/2023

## 2023-03-29 NOTE — DISCHARGE NOTE PROVIDER - NSDCMRMEDTOKEN_GEN_ALL_CORE_FT
acetaminophen 325 mg oral tablet: 2 tab(s) orally every 6 hours, As needed, Mild Pain (1 - 3), Moderate Pain (4 - 6)  aluminum hydroxide-magnesium hydroxide 200 mg-200 mg/5 mL oral suspension: 30 milliliter(s) orally every 6 hours, As needed, Dyspepsia  apixaban 2.5 mg oral tablet: 1 tab(s) orally 2 times a day  aspirin 81 mg oral tablet, chewable: 1 tab(s) orally once a day  atorvastatin 40 mg oral tablet: 1 tab(s) orally once a day (at bedtime)  bumetanide 1 mg oral tablet: 1 tab(s) orally once a day  DuoNeb 0.5 mg-2.5 mg/3 mL inhalation solution: 3 milliliter(s) inhaled 4 times a day, As Needed  gabapentin 100 mg oral capsule: 2 cap(s) orally once a day  insulin lispro 100 units/mL injectable solution: 7  injectable 3 times a day (before meals)  Lantus 100 units/mL subcutaneous solution: 21 unit(s) subcutaneous once a day (at bedtime)  metoprolol tartrate 100 mg oral tablet: 1 tab(s) orally every 12 hours  pantoprazole 40 mg oral delayed release tablet: 1 tab(s) orally 2 times a day  sodium bicarbonate 650 mg oral tablet: 1 tab(s) orally once a day

## 2023-03-29 NOTE — DISCHARGE NOTE PROVIDER - HOSPITAL COURSE
83F with PMH of HTN, HLD, DM2, HFpEF (EF 65%), CAD s/p CABG, Breast CA s/p R partial mastectomy, rectal CA s/p resection with recent admission for acute rissa s/p ERCP w/ stent c/b gallbladder perf w/ perc rissa and recurrent SVT presenting for 1 week of shortness of breath and fluid retention concerning for possible HF exacerbation and admitted with AHRF    # Acute on chronic diastolic congestive heart failure.    TTE as above with EF 75%, b/l pleural effusions, BNP elevated. S/p IV Bumex and gtt 3/25 due to persistent edema on imaging and lack of symptomatic improvement.   Patient now off diuresis due to contraction alkalosis.   Shortness of breath is improving  -Strict Is/Os, daily weights, 1L fluid restriction    # Acute respiratory failure with hypoxia.    Likely 2/2 HF exac above; however pt also noted with hypercarbic and hypoxic respiratory failure and severe pulmonary hypertension on ECHO, with decreased RVSF; pt reports significant second hand smoke exposure.   CXR with Left basilar pleural effusion and/or atelectasis. RVP negative  - Holding off on further diuresis given concurrent metabolic alkalosis, concerning for contraction alkalosis  -CT chest with pulmonary edema and b/l pleural effusions, no PNA  -Low suspicion for PE as pt is on eliquis,   -Cont supportive care    #Metabolic alkalosis.   ·  Plan: Alkalosis noted on ABG from overnight. Likely mixed metabolic and respiratory.  - Hold further Bumex as described above  -  trial acetazolamide 500 mg PO Qd 1x today.    # Stage 3 chronic kidney disease.   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  Creatinine currently at baseline  Avoid nephrotoxins.    #Type 2 diabetes mellitus.    last A1c was 9.6 in January  -c/w home insulin regimen: Lantus 21U qhs, 7U premeal, LDISS.    # CAD (coronary artery disease).   ·  Plan: Stable; c/w ASA, statin.    # Paroxysmal SVT (supraventricular tachycardia).   ·  Plan: Per EP during recent admission, poor candidate for ablation.   -c/w metoprolol 100 BID  -c/w home eliquis 2.5 BID         Medically cleared for discharge by attending ---  Discharge medication  and follow up reviewed with attending -----           83F with PMH of HTN, HLD, DM2, HFpEF (EF 65%), CAD s/p CABG, Breast CA s/p R partial mastectomy, rectal CA s/p resection with recent admission for acute rissa s/p ERCP w/ stent c/b gallbladder perf w/ perc rissa and recurrent SVT presenting for 1 week of shortness of breath and fluid retention concerning for possible HF exacerbation and admitted with AHRF    # Acute on chronic diastolic congestive heart failure.    TTE as above with EF 75%, b/l pleural effusions, BNP elevated. S/p IV Bumex and gtt 3/25 due to persistent edema on imaging and lack of symptomatic improvement.   Patient now off diuresis due to contraction alkalosis.   Shortness of breath is improving  -Strict Is/Os, daily weights, 1L fluid restriction    # Acute respiratory failure with hypoxia.    Likely 2/2 HF exac above; however pt also noted with hypercarbic and hypoxic respiratory failure and severe pulmonary hypertension on ECHO, with decreased RVSF; pt reports significant second hand smoke exposure.   CXR with Left basilar pleural effusion and/or atelectasis. RVP negative  - Holding off on further diuresis given concurrent metabolic alkalosis, concerning for contraction alkalosis  -CT chest with pulmonary edema and b/l pleural effusions, no PNA  -Low suspicion for PE as pt is on eliquis,   -Cont supportive care    #Metabolic alkalosis.   ·  Plan: Alkalosis noted on ABG from overnight. Likely mixed metabolic and respiratory.  - Hold further Bumex as described above  -  trial acetazolamide 500 mg PO Qd 1x today.    # Stage 3 chronic kidney disease.   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  Creatinine currently at baseline  Avoid nephrotoxins.    #Type 2 diabetes mellitus.    last A1c was 9.6 in January  -c/w home insulin regimen: Lantus 21U qhs, 7U premeal, LDISS.    # CAD (coronary artery disease).   ·  Plan: Stable; c/w ASA, statin.    # Paroxysmal SVT (supraventricular tachycardia).   ·  Plan: Per EP during recent admission, poor candidate for ablation.   -c/w metoprolol 100 BID  -c/w home eliquis 2.5 BID         Medically cleared for discharge by attending Dr. Hutchinson  Discharge medication  and follow up reviewed with areli Hutchinson

## 2023-03-29 NOTE — DISCHARGE NOTE NURSING/CASE MANAGEMENT/SOCIAL WORK - NSDCPEFALRISK_GEN_ALL_CORE
For information on Fall & Injury Prevention, visit: [email or web address] OR  [email or web address] OR  https://www.cdc.gov/steadi/patient.html

## 2023-03-29 NOTE — PROVIDER CONTACT NOTE (OTHER) - REASON
PAT 7 sec 
IV site redness
PAT 10 sec 
On assessment patient complains of "bilateral ear discharge for 3 days".
Patient is refusing to be discharged. Patient and family is requesting to speak to provider and be reassessed by PT prior to being discharged.
5 beats WCT
EVENT PAT for 2 sec HR up to 135

## 2023-03-29 NOTE — DISCHARGE NOTE PROVIDER - CARE PROVIDER_API CALL
Sergey Winchester (DO)  Cardiovascular Disease; Internal Medicine; Nuclear Cardiology  [address]  [place] [address]  Phone: [phone]  Fax: [phone]  Follow Up Time:    Ld Hagan)  Cardiovascular Disease; Nuclear Cardiology  [address]  [place] [address]  Phone: [phone]  Fax: [phone]  Follow Up Time: 1 week    Igor Kan)  Internal Medicine; Nephrology  [address] [address]  Phone: [phone]  Fax: [phone]  Follow Up Time: 2 weeks    Katie Espinoza ()  Internal Medicine  [facility], [address] [place] [address]  Phone: [phone]  Fax: [phone]  Established Patient  Follow Up Time: 1 week   Ld Hagan)  Cardiovascular Disease; Nuclear Cardiology  [address]  [place] [address]  Phone: [phone]  Fax: [phone]  Follow Up Time: 1 week    Igor Kan)  Internal Medicine; Nephrology  [address] [address]  Phone: [phone]  Fax: [phone]  Follow Up Time: 2 weeks    Katie Espinoza ()  Internal Medicine  [facility], [address] [place] [address]  Phone: [phone]  Fax: [phone]  Established Patient  Follow Up Time: 1 week    Joseph Blanchard)  Gastroenterology; Internal Medicine  Division of Gastroenterology - [address] [place] [address]  Phone: [phone]  Fax: [phone]  Follow Up Time:

## 2023-03-29 NOTE — DISCHARGE NOTE PROVIDER - PROVIDER TOKENS
PROVIDER:[TOKEN:[[id number]]] PROVIDER:[TOKEN:[[id number]],FOLLOWUP:[1 week]],PROVIDER:[TOKEN:[[id number]],FOLLOWUP:[2 weeks]],PROVIDER:[TOKEN:[[id number]],FOLLOWUP:[1 week],ESTABLISHEDPATIENT:[T]] PROVIDER:[TOKEN:[[id number]],FOLLOWUP:[1 week]],PROVIDER:[TOKEN:[[id number]],FOLLOWUP:[2 weeks]],PROVIDER:[TOKEN:[[id number]],FOLLOWUP:[1 week],ESTABLISHEDPATIENT:[T]],PROVIDER:[TOKEN:[[id number]]]

## 2023-03-29 NOTE — PROVIDER CONTACT NOTE (OTHER) - DATE AND TIME:
23-Mar-2023 05:46
29-Mar-2023 09:59
23-Mar-2023 02:48
23-Mar-2023 05:50
26-Mar-2023 06:20
29-Mar-2023 14:29
28-Mar-2023 05:00

## 2023-03-29 NOTE — DISCHARGE NOTE PROVIDER - CARE PROVIDERS DIRECT ADDRESSES
,DirectAddress_Unknown ,[email or web address] [email or web address] ,[email or web address]

## 2023-03-29 NOTE — CHART NOTE - NSCHARTNOTEFT_GEN_A_CORE
RD CHF education chart note    Patient was visited by RD for CHF education. Heart failure education provided to the patient in detail. Discussed heart failure nutrition therapy, sodium and fluid intake, importance of diet adherence, daily weights monitoring with the patient. Reinforced importance of weight gain parameters and importance of contacting MD’s about weight changes. Provided handouts on heart failure nutrition therapy, reading heart healthy nutrition labels, heart healthy shopping tips and low sodium food list. Patient verbalized understanding demonstrated by teach back method. RD contact information left with patient for any future questioning.  Pt speaks Estonian, used video . RD CHF education chart note    Patient was visited by RD for CHF education. Heart failure education provided to the patient in detail. Discussed heart failure nutrition therapy, sodium and fluid intake, importance of diet adherence, daily weights monitoring with the patient. Reinforced importance of weight gain parameters and importance of contacting MD’s about weight changes. Provided handouts on heart failure nutrition therapy, reading heart healthy nutrition labels, heart healthy shopping tips and low sodium food list. Patient verbalized understanding demonstrated by teach back method. RD contact information left with patient for any future questioning.  Pt speaks Ukrainian, used video .  plan for pt to be discharged today.

## 2023-03-29 NOTE — PROVIDER CONTACT NOTE (OTHER) - BACKGROUND
83F w/ PMHx HTN, HLD, DM2, HFpEF (EF 65%), CAD s/p CABG, Breast CA s/p R partial mastectomy,
Patient admitted for SOB
patient admitted for SOB
Patient adm for acute/ chronic CHF and hypoxic respiratory failure.
Patient being discharged
patient admitted for heart failure
patient admitted for SOB

## 2023-03-29 NOTE — DISCHARGE NOTE PROVIDER - NSDCFUADDAPPT_GEN_ALL_CORE_FT
Follow up with cardiology in a week. APPTS ARE READY TO BE MADE: [ X] YES    Best Family or Patient Contact (if needed):    Additional Information about above appointments (if needed):    1: Follow up with cardiologist Dr. Hagan early next week.   2: Follow up with nephrologist Dr. Kan in 2 weeks.   3: Follow up with your PCP Dr. Espinoza in 1-2 weeks.     Other comments or requests:    APPTS ARE READY TO BE MADE: [ X] YES    Best Family or Patient Contact (if needed):    Additional Information about above appointments (if needed):    1: Follow up with cardiologist Dr. Hagan early next week.   2: Follow up with nephrologist Dr. Kan in 2 weeks.   3: Follow up with your PCP Dr. Espinoza in 1-2 weeks.   4: Follow up with gastroenterologist Dr. Blanchard for outpatient ERCP.     Other comments or requests:    APPTS ARE READY TO BE MADE: [ X] YES    Best Family or Patient Contact (if needed):    Additional Information about above appointments (if needed):    1: Follow up with cardiologist Dr. Hagan early next week.   2: Follow up with nephrologist Dr. Kan in 2 weeks.   3: Follow up with your PCP Dr. Espinoza in 1-2 weeks.   4: Follow up with gastroenterologist Dr. Blanchard for outpatient ERCP.     Other comments or requests:     Patient is being discharged to rehab. Patient/caregiver will arrange follow up care appointments.

## 2023-03-29 NOTE — DISCHARGE NOTE NURSING/CASE MANAGEMENT/SOCIAL WORK - NSDCVIVACCINE_GEN_ALL_CORE_FT
COVID-19, mRNA, LNP-S, PF, 30 mcg/0.3 mL dose (Pfizer); 27-Jul-2021 15:42; Darien Garcia); Pf[employer] Inc; [id number] (Exp. Date: 31-Aug-2021); IntraMuscular; Deltoid Left.; 0.3 milliLiter(s);

## 2023-03-30 VITALS
HEART RATE: 64 BPM | RESPIRATION RATE: 19 BRPM | DIASTOLIC BLOOD PRESSURE: 69 MMHG | OXYGEN SATURATION: 96 % | TEMPERATURE: 97 F | SYSTOLIC BLOOD PRESSURE: 120 MMHG

## 2023-03-30 LAB
ANION GAP SERPL CALC-SCNC: 11 MMOL/L — SIGNIFICANT CHANGE UP (ref 5–17)
BUN SERPL-MCNC: 57 MG/DL — HIGH (ref 7–23)
CALCIUM SERPL-MCNC: 9.2 MG/DL — SIGNIFICANT CHANGE UP (ref 8.4–10.5)
CHLORIDE SERPL-SCNC: 93 MMOL/L — LOW (ref 96–108)
CO2 SERPL-SCNC: 34 MMOL/L — HIGH (ref 22–31)
CREAT SERPL-MCNC: 1.95 MG/DL — HIGH (ref 0.5–1.3)
EGFR: 25 ML/MIN/1.73M2 — LOW
GLUCOSE BLDC GLUCOMTR-MCNC: 175 MG/DL — HIGH (ref 70–99)
GLUCOSE BLDC GLUCOMTR-MCNC: 198 MG/DL — HIGH (ref 70–99)
GLUCOSE BLDC GLUCOMTR-MCNC: 95 MG/DL — SIGNIFICANT CHANGE UP (ref 70–99)
GLUCOSE SERPL-MCNC: 188 MG/DL — HIGH (ref 70–99)
MAGNESIUM SERPL-MCNC: 2 MG/DL — SIGNIFICANT CHANGE UP (ref 1.6–2.6)
PHOSPHATE SERPL-MCNC: 3.1 MG/DL — SIGNIFICANT CHANGE UP (ref 2.5–4.5)
POTASSIUM SERPL-MCNC: 3.8 MMOL/L — SIGNIFICANT CHANGE UP (ref 3.5–5.3)
POTASSIUM SERPL-SCNC: 3.8 MMOL/L — SIGNIFICANT CHANGE UP (ref 3.5–5.3)
SARS-COV-2 RNA SPEC QL NAA+PROBE: SIGNIFICANT CHANGE UP
SODIUM SERPL-SCNC: 138 MMOL/L — SIGNIFICANT CHANGE UP (ref 135–145)

## 2023-03-30 PROCEDURE — 86140 C-REACTIVE PROTEIN: CPT

## 2023-03-30 PROCEDURE — 82330 ASSAY OF CALCIUM: CPT

## 2023-03-30 PROCEDURE — 85018 HEMOGLOBIN: CPT

## 2023-03-30 PROCEDURE — 84295 ASSAY OF SERUM SODIUM: CPT

## 2023-03-30 PROCEDURE — C8929: CPT

## 2023-03-30 PROCEDURE — 82947 ASSAY GLUCOSE BLOOD QUANT: CPT

## 2023-03-30 PROCEDURE — 85730 THROMBOPLASTIN TIME PARTIAL: CPT

## 2023-03-30 PROCEDURE — 97530 THERAPEUTIC ACTIVITIES: CPT

## 2023-03-30 PROCEDURE — 81001 URINALYSIS AUTO W/SCOPE: CPT

## 2023-03-30 PROCEDURE — 82435 ASSAY OF BLOOD CHLORIDE: CPT

## 2023-03-30 PROCEDURE — 36600 WITHDRAWAL OF ARTERIAL BLOOD: CPT

## 2023-03-30 PROCEDURE — 80048 BASIC METABOLIC PNL TOTAL CA: CPT

## 2023-03-30 PROCEDURE — 82436 ASSAY OF URINE CHLORIDE: CPT

## 2023-03-30 PROCEDURE — 99232 SBSQ HOSP IP/OBS MODERATE 35: CPT

## 2023-03-30 PROCEDURE — 71250 CT THORAX DX C-: CPT

## 2023-03-30 PROCEDURE — 94640 AIRWAY INHALATION TREATMENT: CPT

## 2023-03-30 PROCEDURE — 84132 ASSAY OF SERUM POTASSIUM: CPT

## 2023-03-30 PROCEDURE — 84145 PROCALCITONIN (PCT): CPT

## 2023-03-30 PROCEDURE — 36415 COLL VENOUS BLD VENIPUNCTURE: CPT

## 2023-03-30 PROCEDURE — 82565 ASSAY OF CREATININE: CPT

## 2023-03-30 PROCEDURE — 80053 COMPREHEN METABOLIC PANEL: CPT

## 2023-03-30 PROCEDURE — 83880 ASSAY OF NATRIURETIC PEPTIDE: CPT

## 2023-03-30 PROCEDURE — 84100 ASSAY OF PHOSPHORUS: CPT

## 2023-03-30 PROCEDURE — U0003: CPT

## 2023-03-30 PROCEDURE — 87186 SC STD MICRODIL/AGAR DIL: CPT

## 2023-03-30 PROCEDURE — 87637 SARSCOV2&INF A&B&RSV AMP PRB: CPT

## 2023-03-30 PROCEDURE — 71045 X-RAY EXAM CHEST 1 VIEW: CPT

## 2023-03-30 PROCEDURE — 83735 ASSAY OF MAGNESIUM: CPT

## 2023-03-30 PROCEDURE — 93005 ELECTROCARDIOGRAM TRACING: CPT

## 2023-03-30 PROCEDURE — 82803 BLOOD GASES ANY COMBINATION: CPT

## 2023-03-30 PROCEDURE — 84484 ASSAY OF TROPONIN QUANT: CPT

## 2023-03-30 PROCEDURE — 82962 GLUCOSE BLOOD TEST: CPT

## 2023-03-30 PROCEDURE — 85610 PROTHROMBIN TIME: CPT

## 2023-03-30 PROCEDURE — 83036 HEMOGLOBIN GLYCOSYLATED A1C: CPT

## 2023-03-30 PROCEDURE — 99285 EMERGENCY DEPT VISIT HI MDM: CPT | Mod: 25

## 2023-03-30 PROCEDURE — 83605 ASSAY OF LACTIC ACID: CPT

## 2023-03-30 PROCEDURE — 87086 URINE CULTURE/COLONY COUNT: CPT

## 2023-03-30 PROCEDURE — 96374 THER/PROPH/DIAG INJ IV PUSH: CPT

## 2023-03-30 PROCEDURE — U0005: CPT

## 2023-03-30 PROCEDURE — 85027 COMPLETE CBC AUTOMATED: CPT

## 2023-03-30 PROCEDURE — 85014 HEMATOCRIT: CPT

## 2023-03-30 PROCEDURE — 97161 PT EVAL LOW COMPLEX 20 MIN: CPT

## 2023-03-30 PROCEDURE — 85025 COMPLETE CBC W/AUTO DIFF WBC: CPT

## 2023-03-30 PROCEDURE — 85652 RBC SED RATE AUTOMATED: CPT

## 2023-03-30 PROCEDURE — 76770 US EXAM ABDO BACK WALL COMP: CPT

## 2023-03-30 PROCEDURE — 97116 GAIT TRAINING THERAPY: CPT

## 2023-03-30 PROCEDURE — 0225U NFCT DS DNA&RNA 21 SARSCOV2: CPT

## 2023-03-30 PROCEDURE — 83690 ASSAY OF LIPASE: CPT

## 2023-03-30 RX ADMIN — Medication 3 MILLILITER(S): at 17:18

## 2023-03-30 RX ADMIN — Medication 1: at 09:05

## 2023-03-30 RX ADMIN — APIXABAN 2.5 MILLIGRAM(S): 2.5 TABLET, FILM COATED ORAL at 05:03

## 2023-03-30 RX ADMIN — Medication 1: at 13:05

## 2023-03-30 RX ADMIN — PANTOPRAZOLE SODIUM 40 MILLIGRAM(S): 20 TABLET, DELAYED RELEASE ORAL at 05:04

## 2023-03-30 RX ADMIN — Medication 100 MILLIGRAM(S): at 17:18

## 2023-03-30 RX ADMIN — APIXABAN 2.5 MILLIGRAM(S): 2.5 TABLET, FILM COATED ORAL at 17:18

## 2023-03-30 RX ADMIN — Medication 3 MILLILITER(S): at 13:06

## 2023-03-30 RX ADMIN — Medication 81 MILLIGRAM(S): at 12:09

## 2023-03-30 RX ADMIN — Medication 7 UNIT(S): at 09:06

## 2023-03-30 RX ADMIN — Medication 7 UNIT(S): at 17:58

## 2023-03-30 RX ADMIN — Medication 3 MILLILITER(S): at 05:03

## 2023-03-30 RX ADMIN — Medication 7 UNIT(S): at 13:05

## 2023-03-30 RX ADMIN — Medication 100 MILLIGRAM(S): at 05:04

## 2023-03-30 RX ADMIN — PANTOPRAZOLE SODIUM 40 MILLIGRAM(S): 20 TABLET, DELAYED RELEASE ORAL at 17:18

## 2023-03-30 NOTE — PROGRESS NOTE ADULT - ASSESSMENT
no distress  sob improved         albuterol/ipratropium for Nebulization 3 milliLiter(s) Nebulizer every 6 hours  apixaban 2.5 milliGRAM(s) Oral every 12 hours  aspirin  chewable 81 milliGRAM(s) Oral daily  atorvastatin 40 milliGRAM(s) Oral at bedtime  buMETAnide Infusion 1 mG/Hr IV Continuous <Continuous>  dextrose 5%. 1000 milliLiter(s) IV Continuous <Continuous>  dextrose 5%. 1000 milliLiter(s) IV Continuous <Continuous>  dextrose 50% Injectable 25 Gram(s) IV Push once  dextrose 50% Injectable 12.5 Gram(s) IV Push once  dextrose 50% Injectable 25 Gram(s) IV Push once  dextrose Oral Gel 15 Gram(s) Oral once PRN  gabapentin 200 milliGRAM(s) Oral at bedtime  glucagon  Injectable 1 milliGRAM(s) IntraMuscular once  insulin glargine Injectable (LANTUS) 21 Unit(s) SubCutaneous at bedtime  insulin lispro (ADMELOG) corrective regimen sliding scale   SubCutaneous three times a day before meals  insulin lispro (ADMELOG) corrective regimen sliding scale   SubCutaneous at bedtime  insulin lispro Injectable (ADMELOG) 7 Unit(s) SubCutaneous three times a day before meals  methylPREDNISolone   Oral   methylPREDNISolone 4 milliGRAM(s) Oral before breakfast  methylPREDNISolone 4 milliGRAM(s) Oral after lunch  methylPREDNISolone 4 milliGRAM(s) Oral after dinner  methylPREDNISolone 4 milliGRAM(s) Oral at bedtime  metoprolol tartrate 100 milliGRAM(s) Oral every 12 hours  pantoprazole    Tablet 40 milliGRAM(s) Oral every 12 hours      VITAL:  T(C): , Max: 36.8 (03-25-23 @ 17:51)  T(F): , Max: 98.2 (03-25-23 @ 17:51)  HR: 73 (03-26-23 @ 12:20)  BP: 106/68 (03-26-23 @ 12:20)  BP(mean): --  RR: 18 (03-26-23 @ 12:20)  SpO2: 97% (03-26-23 @ 12:20)  Wt(kg): --    03-25-23 @ 07:01  -  03-26-23 @ 07:00  --------------------------------------------------------  IN: 0 mL / OUT: 1900 mL / NET: -1900 mL    03-26-23 @ 07:01  -  03-26-23 @ 15:27  --------------------------------------------------------  IN: 460 mL / OUT: 600 mL / NET: -140 mL        PHYSICAL EXAM:  Constitutional: NAD  Respiratory: diminished at bases   Cardiovascular: S1 and S2, RRR  Gastrointestinal: + BS, soft, NT, ND  Extremities:   leg edema improved   Neurological: AAO x 3, CN 2-12 intact  : No [place]      LABS:      Na(141)/K(4.2)/Cl(90)/HCO3(38)/BUN(28)/Cr(1.42)Glu(248)/Ca(9.2)/Mg(--)/PO4(--)    03-26 @ 06:49  Na(140)/K(4.3)/Cl(92)/HCO3(39)/BUN(26)/Cr(1.44)Glu(246)/Ca(9.0)/Mg(1.8)/PO4(3.4)    03-26 @ 03:55  Na(149)/K(4.7)/Cl(89)/HCO3(38)/BUN(21)/Cr(1.26)Glu(235)/Ca(9.3)/Mg(1.9)/PO4(2.7)    03-25 @ 12:03  Na(141)/K(3.8)/Cl(97)/HCO3(35)/BUN(19)/Cr(1.28)Glu(176)/Ca(8.8)/Mg(1.9)/PO4(3.3)    03-24 @ 12:37  Na(141)/K(3.6)/Cl(96)/HCO3(36)/BUN(21)/Cr(1.30)Glu(124)/Ca(8.9)/Mg(1.7)/PO4(2.5)    03-23 @ 17:36            ASSESSMENT/PLAN  ASSESSMENT:    Patient is a 80 year old F with a PMHx of HTN, HLD, T2DM, HFpEF (EF 65% X/2022), breast cancer s/p R partial mastectomy, rectal carcinoma s/p resection, CAD s/p CABG (11/2020) recent ERCP , with cholecystotomy tube placement  who presents with sob  home bumex 1 mg PO     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  cont holding bumex  for today ,  keep k ~4, mag 2   avoid nephrotoxic medication  dose all medications for gfr 30-35   ml/min  daily bmp , mag and Po4   renal diet   monitor I/Os  2)CVS: continue BP meds as ordered for now  upon discharge will follow with Dr Kan .        [facility]  Office: [phone]

## 2023-03-30 NOTE — PROGRESS NOTE ADULT - SUBJECTIVE AND OBJECTIVE BOX
[facility] Division of Hospital Medicine  Wes Hutchinson MD  Available via MS Teams  Pager [phone]    SUBJECTIVE / OVERNIGHT EVENTS: Patient seen and examined at bedside, resting comfortably and in no acute distress. Denies chest pain, palpitations. Denies shortness of breath or cough. ROS otherwise unremarkable.     MEDICATIONS  (STANDING):  albuterol/ipratropium for Nebulization 3 milliLiter(s) Nebulizer every 6 hours  apixaban 2.5 milliGRAM(s) Oral every 12 hours  aspirin  chewable 81 milliGRAM(s) Oral daily  atorvastatin 40 milliGRAM(s) Oral at bedtime  dextrose 5%. 1000 milliLiter(s) (100 mL/Hr) IV Continuous <Continuous>  dextrose 5%. 1000 milliLiter(s) (50 mL/Hr) IV Continuous <Continuous>  dextrose 50% Injectable 25 Gram(s) IV Push once  dextrose 50% Injectable 12.5 Gram(s) IV Push once  dextrose 50% Injectable 25 Gram(s) IV Push once  gabapentin 200 milliGRAM(s) Oral at bedtime  glucagon  Injectable 1 milliGRAM(s) IntraMuscular once  insulin glargine Injectable (LANTUS) 21 Unit(s) SubCutaneous at bedtime  insulin lispro (ADMELOG) corrective regimen sliding scale   SubCutaneous three times a day before meals  insulin lispro (ADMELOG) corrective regimen sliding scale   SubCutaneous at bedtime  insulin lispro Injectable (ADMELOG) 7 Unit(s) SubCutaneous three times a day before meals  metoprolol tartrate 100 milliGRAM(s) Oral every 12 hours  pantoprazole    Tablet 40 milliGRAM(s) Oral every 12 hours    MEDICATIONS  (PRN):  dextrose Oral Gel 15 Gram(s) Oral once PRN Blood Glucose LESS THAN 70 milliGRAM(s)/deciliter      I&O's Summary    30 Mar 2023 07:01  -  30 Mar 2023 14:38  --------------------------------------------------------  IN: 90 mL / OUT: 550 mL / NET: -460 mL        PHYSICAL EXAM:  Vital Signs Last 24 Hrs  T(C): 36.4 (30 Mar 2023 12:02), Max: 36.6 (29 Mar 2023 20:12)  T(F): 97.6 (30 Mar 2023 12:02), Max: 97.8 (29 Mar 2023 20:12)  HR: 67 (30 Mar 2023 12:02) (61 - 75)  BP: 99/65 (30 Mar 2023 12:02) (99/65 - 114/73)  BP(mean): --  RR: 18 (30 Mar 2023 12:02) (18 - 18)  SpO2: 94% (30 Mar 2023 12:02) (92% - 95%)    Parameters below as of 30 Mar 2023 12:02  Patient On (Oxygen Delivery Method): room air      CONSTITUTIONAL: NAD, well-groomed  EYES: PERRLA; conjunctiva and sclera clear  ENMT: Moist oral mucosa, no pharyngeal injection or exudates; normal dentition  NECK: Supple, no palpable masses; no thyromegaly  RESPIRATORY: Normal respiratory effort; lungs are clear to auscultation bilaterally  CARDIOVASCULAR: normal S1 and S2, no murmur/rub/gallop; No lower extremity edema  ABDOMEN: Nontender to palpation, normoactive bowel sounds, no rebound/guarding; No hepatosplenomegaly  MUSCULOSKELETAL:  no clubbing or cyanosis of digits; no joint swelling or tenderness to palpation  PSYCH: A+O to person, place, and time; affect appropriate  NEUROLOGY: CN 2-12 are intact and symmetric; no gross sensory deficits   SKIN: No rashes; no palpable lesions    LABS:    03-30    138  |  93<L>  |  57<H>  ----------------------------<  188<H>  3.8   |  34<H>  |  1.95<H>    Ca    9.2      30 Mar 2023 07:17  Phos  3.1     03-30  Mg     2.0     03-30                SARS-CoV-2: NotDetec (22 Mar 2023 18:43)  SARS-CoV-2: NotDetec (06 Feb 2023 13:12)  SARS-CoV-2: NotDetec (01 Feb 2023 11:11)  COVID-19 PCR: NotDetec (29 Jan 2023 07:38)  SARS-CoV-2: NotDetec (23 Jan 2023 07:26)

## 2023-03-30 NOTE — PROGRESS NOTE ADULT - PROBLEM SELECTOR PLAN 8
Per EP during recent admission, poor candidate for ablation.   -c/w metoprolol 100 BID  -c/w home eliquis 2.5 BID   -monitor on telemetry
Per EP during recent admission, poor candidate for ablation.   -c/w metoprolol 100 BID  -c/w home eliquis 2.5 BID   -monitor on telemetry
On eliquis as above
On eliquis as above
Per EP during recent admission, poor candidate for ablation.   -c/w metoprolol 100 BID  -c/w home eliquis 2.5 BID   -monitor on telemetry
Per EP during recent admission, poor candidate for ablation.   -c/w metoprolol 100 BID  -c/w home eliquis 2.5 BID   -monitor on telemetry

## 2023-03-30 NOTE — PROGRESS NOTE ADULT - NSPROGADDITIONALINFOA_GEN_ALL_CORE
Case d/w ACP on IDRs
D/W pt's family at bedside
D/W pt and daughter at bedside

## 2023-03-30 NOTE — PROGRESS NOTE ADULT - PROBLEM SELECTOR PLAN 2
RESOLVED  Likely 2/2 HF exac above; however pt also noted with hypercarbic and hypoxic respiratory failure and severe pulmonary hypertension on ECHO, with decreased RVSF; pt reports significant second hand smoke exposure. CXR with Left basilar pleural effusion and/or atelectasis. RVP negative  - Currently on room air  - Continue with diuresis as above

## 2023-03-30 NOTE — PROGRESS NOTE ADULT - PROBLEM SELECTOR PLAN 3
Alkalosis noted on ABG from overnight. Likely mixed metabolic and respiratory.  - C/w acetazolamide as ordered

## 2023-03-30 NOTE — PROGRESS NOTE ADULT - PROBLEM SELECTOR PROBLEM 8
Paroxysmal SVT (supraventricular tachycardia)
Need for prophylactic measure
Paroxysmal SVT (supraventricular tachycardia)
Paroxysmal SVT (supraventricular tachycardia)
Need for prophylactic measure
Paroxysmal SVT (supraventricular tachycardia)

## 2023-03-30 NOTE — PROGRESS NOTE ADULT - PROBLEM SELECTOR PLAN 1
TTE as above with EF 75%, b/l pleural effusions, BNP elevated. S/p IV Bumex, switched to Bumex gtt 3/25 due to persistent edema on imaging and lack of symptomatic improvement. Patient now off diuresis due to contraction alkalosis. Shortness of breath is resolved.  -Strict Is/Os, daily weights, 1L fluid restriction  -cardiology recs appreciated  -Monitor BMP BID and replete  - Resume diuresis on 3/31

## 2023-03-30 NOTE — PROGRESS NOTE ADULT - ASSESSMENT
83F with PMH of HTN, HLD, DM2, HFpEF (EF 65%), CAD s/p CABG, Breast CA s/p R partial mastectomy, rectal CA s/p resection with recent admission for acute rissa s/p ERCP w/ stent c/b gallbladder perf w/ perc rissa and recurrent SVT presenting for 1 week of shortness of breath and fluid retention concerning for possible HF exacerbation and admitted with AHRF. Now diuresed and medically ready for RAMIRO.

## 2023-03-30 NOTE — PROGRESS NOTE ADULT - PROVIDER SPECIALTY LIST ADULT
Cardiology
Nephrology
Nephrology
Cardiology
Internal Medicine
Nephrology
Nephrology
Cardiology
Nephrology
Internal Medicine

## 2023-04-10 ENCOUNTER — TRANSCRIPTION ENCOUNTER (OUTPATIENT)
Age: 83
End: 2023-04-10

## 2023-04-19 ENCOUNTER — APPOINTMENT (OUTPATIENT)
Dept: INTERNAL MEDICINE | Facility: CLINIC | Age: 83
End: 2023-04-19
Payer: MEDICARE

## 2023-04-19 VITALS
BODY MASS INDEX: 33.79 KG/M2 | HEIGHT: 61 IN | HEART RATE: 74 BPM | RESPIRATION RATE: 14 BRPM | OXYGEN SATURATION: 98 % | TEMPERATURE: 96.6 F | WEIGHT: 179 LBS

## 2023-04-19 VITALS — SYSTOLIC BLOOD PRESSURE: 109 MMHG | DIASTOLIC BLOOD PRESSURE: 71 MMHG

## 2023-04-19 PROCEDURE — 99214 OFFICE O/P EST MOD 30 MIN: CPT

## 2023-04-19 RX ORDER — REPAGLINIDE 1 MG/1
1 TABLET ORAL 3 TIMES DAILY
Qty: 90 | Refills: 2 | Status: DISCONTINUED | COMMUNITY
End: 2023-04-19

## 2023-04-19 RX ORDER — AMLODIPINE BESYLATE 5 MG/1
5 TABLET ORAL DAILY
Qty: 90 | Refills: 1 | Status: DISCONTINUED | COMMUNITY
Start: 2021-01-12 | End: 2023-04-19

## 2023-04-19 RX ORDER — DOXYCYCLINE HYCLATE 100 MG/1
100 CAPSULE ORAL
Qty: 14 | Refills: 0 | Status: DISCONTINUED | COMMUNITY
Start: 2022-07-06 | End: 2023-04-19

## 2023-04-19 RX ORDER — SITAGLIPTIN 25 MG/1
25 TABLET, FILM COATED ORAL
Qty: 90 | Refills: 1 | Status: DISCONTINUED | COMMUNITY
Start: 2019-03-08 | End: 2023-04-19

## 2023-04-19 RX ORDER — SEMAGLUTIDE 1.34 MG/ML
2 INJECTION, SOLUTION SUBCUTANEOUS
Qty: 3 | Refills: 0 | Status: DISCONTINUED | COMMUNITY
Start: 2022-07-14 | End: 2023-04-19

## 2023-04-20 ENCOUNTER — OUTPATIENT (OUTPATIENT)
Dept: OUTPATIENT SERVICES | Facility: HOSPITAL | Age: 83
LOS: 1 days | End: 2023-04-20
Payer: MEDICARE

## 2023-04-20 VITALS
WEIGHT: 181 LBS | HEART RATE: 66 BPM | OXYGEN SATURATION: 98 % | HEIGHT: 60 IN | RESPIRATION RATE: 18 BRPM | DIASTOLIC BLOOD PRESSURE: 85 MMHG | SYSTOLIC BLOOD PRESSURE: 121 MMHG | TEMPERATURE: 98 F

## 2023-04-20 DIAGNOSIS — Z98.89 OTHER SPECIFIED POSTPROCEDURAL STATES: Chronic | ICD-10-CM

## 2023-04-20 DIAGNOSIS — Z90.11 ACQUIRED ABSENCE OF RIGHT BREAST AND NIPPLE: Chronic | ICD-10-CM

## 2023-04-20 DIAGNOSIS — K80.20 CALCULUS OF GALLBLADDER WITHOUT CHOLECYSTITIS WITHOUT OBSTRUCTION: ICD-10-CM

## 2023-04-20 DIAGNOSIS — E11.9 TYPE 2 DIABETES MELLITUS WITHOUT COMPLICATIONS: ICD-10-CM

## 2023-04-20 DIAGNOSIS — Z96.651 PRESENCE OF RIGHT ARTIFICIAL KNEE JOINT: Chronic | ICD-10-CM

## 2023-04-20 DIAGNOSIS — Z01.818 ENCOUNTER FOR OTHER PREPROCEDURAL EXAMINATION: ICD-10-CM

## 2023-04-20 DIAGNOSIS — Z96.641 PRESENCE OF RIGHT ARTIFICIAL HIP JOINT: Chronic | ICD-10-CM

## 2023-04-20 DIAGNOSIS — M51.26 OTHER INTERVERTEBRAL DISC DISPLACEMENT, LUMBAR REGION: Chronic | ICD-10-CM

## 2023-04-20 DIAGNOSIS — Z93.2 ILEOSTOMY STATUS: Chronic | ICD-10-CM

## 2023-04-20 DIAGNOSIS — Z46.89 ENCOUNTER FOR FITTING AND ADJUSTMENT OF OTHER SPECIFIED DEVICES: ICD-10-CM

## 2023-04-20 DIAGNOSIS — Z90.49 ACQUIRED ABSENCE OF OTHER SPECIFIED PARTS OF DIGESTIVE TRACT: Chronic | ICD-10-CM

## 2023-04-20 LAB
ALBUMIN SERPL ELPH-MCNC: 3.8 G/DL — SIGNIFICANT CHANGE UP (ref 3.3–5)
ALP SERPL-CCNC: 93 U/L — SIGNIFICANT CHANGE UP (ref 40–120)
ALT FLD-CCNC: 19 U/L — SIGNIFICANT CHANGE UP (ref 10–45)
ANION GAP SERPL CALC-SCNC: 13 MMOL/L — SIGNIFICANT CHANGE UP (ref 5–17)
AST SERPL-CCNC: 21 U/L — SIGNIFICANT CHANGE UP (ref 10–40)
BILIRUB SERPL-MCNC: 0.2 MG/DL — SIGNIFICANT CHANGE UP (ref 0.2–1.2)
BUN SERPL-MCNC: 52 MG/DL — HIGH (ref 7–23)
CALCIUM SERPL-MCNC: 9 MG/DL — SIGNIFICANT CHANGE UP (ref 8.4–10.5)
CHLORIDE SERPL-SCNC: 101 MMOL/L — SIGNIFICANT CHANGE UP (ref 96–108)
CO2 SERPL-SCNC: 28 MMOL/L — SIGNIFICANT CHANGE UP (ref 22–31)
CREAT SERPL-MCNC: 2.18 MG/DL — HIGH (ref 0.5–1.3)
EGFR: 22 ML/MIN/1.73M2 — LOW
GLUCOSE SERPL-MCNC: 61 MG/DL — LOW (ref 70–99)
HCT VFR BLD CALC: 35.3 % — SIGNIFICANT CHANGE UP (ref 34.5–45)
HGB BLD-MCNC: 10.6 G/DL — LOW (ref 11.5–15.5)
MCHC RBC-ENTMCNC: 27.7 PG — SIGNIFICANT CHANGE UP (ref 27–34)
MCHC RBC-ENTMCNC: 30 GM/DL — LOW (ref 32–36)
MCV RBC AUTO: 92.4 FL — SIGNIFICANT CHANGE UP (ref 80–100)
NRBC # BLD: 0 /100 WBCS — SIGNIFICANT CHANGE UP (ref 0–0)
PLATELET # BLD AUTO: 388 K/UL — SIGNIFICANT CHANGE UP (ref 150–400)
POTASSIUM SERPL-MCNC: 4 MMOL/L — SIGNIFICANT CHANGE UP (ref 3.5–5.3)
POTASSIUM SERPL-SCNC: 4 MMOL/L — SIGNIFICANT CHANGE UP (ref 3.5–5.3)
PROT SERPL-MCNC: 7.4 G/DL — SIGNIFICANT CHANGE UP (ref 6–8.3)
RBC # BLD: 3.82 M/UL — SIGNIFICANT CHANGE UP (ref 3.8–5.2)
RBC # FLD: 15.2 % — HIGH (ref 10.3–14.5)
SODIUM SERPL-SCNC: 142 MMOL/L — SIGNIFICANT CHANGE UP (ref 135–145)
WBC # BLD: 10.44 K/UL — SIGNIFICANT CHANGE UP (ref 3.8–10.5)
WBC # FLD AUTO: 10.44 K/UL — SIGNIFICANT CHANGE UP (ref 3.8–10.5)

## 2023-04-20 PROCEDURE — G0463: CPT

## 2023-04-20 PROCEDURE — 80053 COMPREHEN METABOLIC PANEL: CPT

## 2023-04-20 PROCEDURE — 85027 COMPLETE CBC AUTOMATED: CPT

## 2023-04-20 PROCEDURE — 83036 HEMOGLOBIN GLYCOSYLATED A1C: CPT

## 2023-04-20 RX ORDER — IPRATROPIUM/ALBUTEROL SULFATE 18-103MCG
3 AEROSOL WITH ADAPTER (GRAM) INHALATION
Qty: 0 | Refills: 0 | DISCHARGE

## 2023-04-20 RX ORDER — GABAPENTIN 400 MG/1
1 CAPSULE ORAL
Refills: 0 | DISCHARGE

## 2023-04-20 RX ORDER — BUMETANIDE 0.25 MG/ML
1 INJECTION INTRAMUSCULAR; INTRAVENOUS
Refills: 0 | DISCHARGE

## 2023-04-20 NOTE — H&P PST ADULT - MUSCULOSKELETAL
no joint erythema/no joint warmth/no calf tenderness/decreased ROM/decreased strength/abnormal gait details…

## 2023-04-20 NOTE — H&P PST ADULT - NSICDXPASTSURGICALHX_GEN_ALL_CORE_FT
PAST SURGICAL HISTORY:  Herniated lumbar intervertebral disc     History of appendectomy 1953    S/P colon resection 2015    S/P hip replacement, right     S/P ileostomy low anterior resection-8/10/15, reversal of ileostomy 2016    S/P mastectomy, right 1989    S/P TKR (total knee replacement), right 2017

## 2023-04-20 NOTE — H&P PST ADULT - PROBLEM SELECTOR PLAN 1
pt scheduled for ERCP  Stent / Stone Removal Anes with Dr. Blanchard on 4/26/23.  Pre-op instructions given, all questions answered.  pt. instructed to last dose of eliquis 4/22/23.  Pt. instructed to continue ASA 81mg regimen.  Labs: CBC, BMP, A1C

## 2023-04-20 NOTE — H&P PST ADULT - ASSESSMENT
DASI Score:  DASI Activity: able to go up one flight of stairs or walk 1-2 blocks with out difficulty  Loose or removable teeth: denies  DASI Score:4.40  DASI Activity: walks with a walker, able to go up one flight of stairs or walk 1 blocks with out difficulty. perform mild house chores, washing dishes.  Loose or removable teeth: denies

## 2023-04-20 NOTE — H&P PST ADULT - PROBLEM SELECTOR PLAN 2
Pt. instructed Reduce basaglar from 21 unit to 15 units on 4/25/23 pm.  Pt. instructed last dose of Novolog 4/25/23 AM dose.

## 2023-04-20 NOTE — H&P PST ADULT - HISTORY OF PRESENT ILLNESS
83F w/ PMHx HTN, HLD, DM2, HFpEF (EF 65%), CAD s/p CABG, Breast CA s/p R partial mastectomy, rectal CA s/p resection with recent admission for acute rissa s/p ERCP w/ stent c/b gallbladder perf w/ perc rissa and recurrent SVT presenting for 1 week of shortness of breath and fluid retention. Patient has been at [facility] since her discharge from the hospital on 2/7/23. About a week ago she woke up in the middle of the night short of breath which continued to persist. She additionally complaints of epigastric burning sensation and early satiety. At [facility] she was placed on oxygen with minimal improvement in respiratory status, also received additional lasix 40 and metolazone today prior to coming to the ED. In the ED she received an additional 40mg IV lasix. Patient denies sick contacts. Since her last admission, she had followed up with ID who recommended monitoring pt off abx. She also saw GI the same day, recommending repeat ERCP end of month/beginning of April.     Lymphodema messi Dubose    Of Note: pt recently admitted to  [facility] 83 year old female presents to PST prior to scheduled ERCP  Stent / Stone Removal Anes with Dr. Blanchard on 4/26/23. PMHx HTN, HLD, DM2, HFpEF (EF 65%), CAD s/p CABG, Breast CA s/p R partial mastectomy, rectal CA s/p resection with recent admission for acute rissa s/p ERCP w/ stent c/b gallbladder perf w/ perc rissa and recurrent SVT presenting for 1 week of shortness of breath and fluid retention. Pt was discharged to [facility] on 2/7/23 . On March 22, 23 pt stated she woke up in the middle of the night short of breath which continued to persist. She additionally complaints of epigastric burning sensation and early satiety. At [facility] she was placed on oxygen with minimal improvement in respiratory status, also received additional lasix 40 and metolazone prior to going to the ED.  Pt was readmitted  for SOB. In the ED she received an additional 40mg IV lasix. Pt a was discharge on 3/30/23 readmitted to to [facility]. Subsequently pt was readmitted to hospital from [facility] on 4/4/23- 4/14/23 for SOB. Denies any chest pain, palpitations, SOB, N/V, fever or chills at this time.        Denies any previous Covid 19 infections.

## 2023-04-20 NOTE — H&P PST ADULT - SKIN
bruising noted throughout  left arm,  right arm lymphedema/warm and dry/color normal/normal/no rashes/no ulcers

## 2023-04-21 LAB
A1C WITH ESTIMATED AVERAGE GLUCOSE RESULT: 7.8 % — HIGH (ref 4–5.6)
ESTIMATED AVERAGE GLUCOSE: 177 MG/DL — HIGH (ref 68–114)

## 2023-04-24 ENCOUNTER — INPATIENT (INPATIENT)
Facility: HOSPITAL | Age: 83
LOS: 22 days | Discharge: HOME HEALTH | DRG: 286 | End: 2023-05-17
Attending: STUDENT IN AN ORGANIZED HEALTH CARE EDUCATION/TRAINING PROGRAM | Admitting: STUDENT IN AN ORGANIZED HEALTH CARE EDUCATION/TRAINING PROGRAM
Payer: MEDICARE

## 2023-04-24 VITALS
RESPIRATION RATE: 20 BRPM | HEIGHT: 60 IN | OXYGEN SATURATION: 100 % | SYSTOLIC BLOOD PRESSURE: 161 MMHG | HEART RATE: 64 BPM | DIASTOLIC BLOOD PRESSURE: 66 MMHG | TEMPERATURE: 98 F | WEIGHT: 186.29 LBS

## 2023-04-24 DIAGNOSIS — E11.9 TYPE 2 DIABETES MELLITUS WITHOUT COMPLICATIONS: ICD-10-CM

## 2023-04-24 DIAGNOSIS — I10 ESSENTIAL (PRIMARY) HYPERTENSION: ICD-10-CM

## 2023-04-24 DIAGNOSIS — Z90.49 ACQUIRED ABSENCE OF OTHER SPECIFIED PARTS OF DIGESTIVE TRACT: Chronic | ICD-10-CM

## 2023-04-24 DIAGNOSIS — Z93.2 ILEOSTOMY STATUS: Chronic | ICD-10-CM

## 2023-04-24 DIAGNOSIS — Z96.641 PRESENCE OF RIGHT ARTIFICIAL HIP JOINT: Chronic | ICD-10-CM

## 2023-04-24 DIAGNOSIS — Z29.9 ENCOUNTER FOR PROPHYLACTIC MEASURES, UNSPECIFIED: ICD-10-CM

## 2023-04-24 DIAGNOSIS — M51.26 OTHER INTERVERTEBRAL DISC DISPLACEMENT, LUMBAR REGION: Chronic | ICD-10-CM

## 2023-04-24 DIAGNOSIS — Z98.89 OTHER SPECIFIED POSTPROCEDURAL STATES: Chronic | ICD-10-CM

## 2023-04-24 DIAGNOSIS — Z90.11 ACQUIRED ABSENCE OF RIGHT BREAST AND NIPPLE: Chronic | ICD-10-CM

## 2023-04-24 DIAGNOSIS — Z96.651 PRESENCE OF RIGHT ARTIFICIAL KNEE JOINT: Chronic | ICD-10-CM

## 2023-04-24 DIAGNOSIS — J96.01 ACUTE RESPIRATORY FAILURE WITH HYPOXIA: ICD-10-CM

## 2023-04-24 DIAGNOSIS — I50.33 ACUTE ON CHRONIC DIASTOLIC (CONGESTIVE) HEART FAILURE: ICD-10-CM

## 2023-04-24 DIAGNOSIS — Z87.19 PERSONAL HISTORY OF OTHER DISEASES OF THE DIGESTIVE SYSTEM: ICD-10-CM

## 2023-04-24 DIAGNOSIS — D64.9 ANEMIA, UNSPECIFIED: ICD-10-CM

## 2023-04-24 DIAGNOSIS — N18.30 CHRONIC KIDNEY DISEASE, STAGE 3 UNSPECIFIED: ICD-10-CM

## 2023-04-24 DIAGNOSIS — I50.9 HEART FAILURE, UNSPECIFIED: ICD-10-CM

## 2023-04-24 PROBLEM — K80.20 CALCULUS OF GALLBLADDER WITHOUT CHOLECYSTITIS WITHOUT OBSTRUCTION: Chronic | Status: ACTIVE | Noted: 2023-04-20

## 2023-04-24 LAB
ALBUMIN SERPL ELPH-MCNC: 3.4 G/DL — SIGNIFICANT CHANGE UP (ref 3.3–5)
ALP SERPL-CCNC: 139 U/L — HIGH (ref 40–120)
ALT FLD-CCNC: 37 U/L — SIGNIFICANT CHANGE UP (ref 10–45)
ANION GAP SERPL CALC-SCNC: 11 MMOL/L — SIGNIFICANT CHANGE UP (ref 5–17)
APTT BLD: 29.2 SEC — SIGNIFICANT CHANGE UP (ref 27.5–35.5)
AST SERPL-CCNC: 46 U/L — HIGH (ref 10–40)
BASOPHILS # BLD AUTO: 0 K/UL — SIGNIFICANT CHANGE UP (ref 0–0.2)
BASOPHILS NFR BLD AUTO: 0 % — SIGNIFICANT CHANGE UP (ref 0–2)
BILIRUB SERPL-MCNC: 0.2 MG/DL — SIGNIFICANT CHANGE UP (ref 0.2–1.2)
BUN SERPL-MCNC: 42 MG/DL — HIGH (ref 7–23)
CALCIUM SERPL-MCNC: 8.8 MG/DL — SIGNIFICANT CHANGE UP (ref 8.4–10.5)
CHLORIDE SERPL-SCNC: 103 MMOL/L — SIGNIFICANT CHANGE UP (ref 96–108)
CO2 SERPL-SCNC: 25 MMOL/L — SIGNIFICANT CHANGE UP (ref 22–31)
CREAT SERPL-MCNC: 1.74 MG/DL — HIGH (ref 0.5–1.3)
D DIMER BLD IA.RAPID-MCNC: 264 NG/ML DDU — HIGH
EGFR: 28 ML/MIN/1.73M2 — LOW
EOSINOPHIL # BLD AUTO: 0.07 K/UL — SIGNIFICANT CHANGE UP (ref 0–0.5)
EOSINOPHIL NFR BLD AUTO: 0.9 % — SIGNIFICANT CHANGE UP (ref 0–6)
GLUCOSE BLDC GLUCOMTR-MCNC: 133 MG/DL — HIGH (ref 70–99)
GLUCOSE BLDC GLUCOMTR-MCNC: 94 MG/DL — SIGNIFICANT CHANGE UP (ref 70–99)
GLUCOSE SERPL-MCNC: 258 MG/DL — HIGH (ref 70–99)
HCT VFR BLD CALC: 32.8 % — LOW (ref 34.5–45)
HGB BLD-MCNC: 9.6 G/DL — LOW (ref 11.5–15.5)
INR BLD: 1.11 RATIO — SIGNIFICANT CHANGE UP (ref 0.88–1.16)
LYMPHOCYTES # BLD AUTO: 1.47 K/UL — SIGNIFICANT CHANGE UP (ref 1–3.3)
LYMPHOCYTES # BLD AUTO: 18 % — SIGNIFICANT CHANGE UP (ref 13–44)
MANUAL SMEAR VERIFICATION: SIGNIFICANT CHANGE UP
MCHC RBC-ENTMCNC: 27.4 PG — SIGNIFICANT CHANGE UP (ref 27–34)
MCHC RBC-ENTMCNC: 29.3 GM/DL — LOW (ref 32–36)
MCV RBC AUTO: 93.4 FL — SIGNIFICANT CHANGE UP (ref 80–100)
MONOCYTES # BLD AUTO: 0.22 K/UL — SIGNIFICANT CHANGE UP (ref 0–0.9)
MONOCYTES NFR BLD AUTO: 2.7 % — SIGNIFICANT CHANGE UP (ref 2–14)
NEUTROPHILS # BLD AUTO: 6.42 K/UL — SIGNIFICANT CHANGE UP (ref 1.8–7.4)
NEUTROPHILS NFR BLD AUTO: 78.4 % — HIGH (ref 43–77)
NT-PROBNP SERPL-SCNC: 2505 PG/ML — HIGH (ref 0–300)
PLAT MORPH BLD: NORMAL — SIGNIFICANT CHANGE UP
PLATELET # BLD AUTO: 403 K/UL — HIGH (ref 150–400)
POLYCHROMASIA BLD QL SMEAR: SLIGHT — SIGNIFICANT CHANGE UP
POTASSIUM SERPL-MCNC: 4.7 MMOL/L — SIGNIFICANT CHANGE UP (ref 3.5–5.3)
POTASSIUM SERPL-SCNC: 4.7 MMOL/L — SIGNIFICANT CHANGE UP (ref 3.5–5.3)
PROT SERPL-MCNC: 7.3 G/DL — SIGNIFICANT CHANGE UP (ref 6–8.3)
PROTHROM AB SERPL-ACNC: 12.8 SEC — SIGNIFICANT CHANGE UP (ref 10.5–13.4)
RBC # BLD: 3.51 M/UL — LOW (ref 3.8–5.2)
RBC # FLD: 15.4 % — HIGH (ref 10.3–14.5)
RBC BLD AUTO: ABNORMAL
SARS-COV-2 RNA SPEC QL NAA+PROBE: SIGNIFICANT CHANGE UP
SODIUM SERPL-SCNC: 139 MMOL/L — SIGNIFICANT CHANGE UP (ref 135–145)
TARGETS BLD QL SMEAR: SLIGHT — SIGNIFICANT CHANGE UP
TROPONIN T, HIGH SENSITIVITY RESULT: 21 NG/L — SIGNIFICANT CHANGE UP (ref 0–51)
WBC # BLD: 8.19 K/UL — SIGNIFICANT CHANGE UP (ref 3.8–10.5)
WBC # FLD AUTO: 8.19 K/UL — SIGNIFICANT CHANGE UP (ref 3.8–10.5)

## 2023-04-24 PROCEDURE — 93308 TTE F-UP OR LMTD: CPT | Mod: 26

## 2023-04-24 PROCEDURE — 71045 X-RAY EXAM CHEST 1 VIEW: CPT | Mod: 26

## 2023-04-24 PROCEDURE — 99285 EMERGENCY DEPT VISIT HI MDM: CPT | Mod: FS

## 2023-04-24 PROCEDURE — 99223 1ST HOSP IP/OBS HIGH 75: CPT | Mod: GC,AI

## 2023-04-24 RX ORDER — DEXTROSE 50 % IN WATER 50 %
15 SYRINGE (ML) INTRAVENOUS ONCE
Refills: 0 | Status: DISCONTINUED | OUTPATIENT
Start: 2023-04-24 | End: 2023-05-17

## 2023-04-24 RX ORDER — DEXTROSE 50 % IN WATER 50 %
25 SYRINGE (ML) INTRAVENOUS ONCE
Refills: 0 | Status: DISCONTINUED | OUTPATIENT
Start: 2023-04-24 | End: 2023-05-17

## 2023-04-24 RX ORDER — GLUCAGON INJECTION, SOLUTION 0.5 MG/.1ML
1 INJECTION, SOLUTION SUBCUTANEOUS ONCE
Refills: 0 | Status: DISCONTINUED | OUTPATIENT
Start: 2023-04-24 | End: 2023-05-17

## 2023-04-24 RX ORDER — INSULIN ASPART 100 [IU]/ML
0 INJECTION, SOLUTION SUBCUTANEOUS
Refills: 0 | DISCHARGE

## 2023-04-24 RX ORDER — INSULIN GLARGINE 100 [IU]/ML
7 INJECTION, SOLUTION SUBCUTANEOUS AT BEDTIME
Refills: 0 | Status: DISCONTINUED | OUTPATIENT
Start: 2023-04-24 | End: 2023-05-04

## 2023-04-24 RX ORDER — BUMETANIDE 0.25 MG/ML
2 INJECTION INTRAMUSCULAR; INTRAVENOUS DAILY
Refills: 0 | Status: DISCONTINUED | OUTPATIENT
Start: 2023-04-25 | End: 2023-04-27

## 2023-04-24 RX ORDER — GABAPENTIN 400 MG/1
200 CAPSULE ORAL DAILY
Refills: 0 | Status: DISCONTINUED | OUTPATIENT
Start: 2023-04-24 | End: 2023-05-17

## 2023-04-24 RX ORDER — ACETAMINOPHEN 500 MG
650 TABLET ORAL EVERY 6 HOURS
Refills: 0 | Status: DISCONTINUED | OUTPATIENT
Start: 2023-04-24 | End: 2023-05-17

## 2023-04-24 RX ORDER — ATORVASTATIN CALCIUM 80 MG/1
40 TABLET, FILM COATED ORAL AT BEDTIME
Refills: 0 | Status: DISCONTINUED | OUTPATIENT
Start: 2023-04-24 | End: 2023-05-17

## 2023-04-24 RX ORDER — HEPARIN SODIUM 5000 [USP'U]/ML
6500 INJECTION INTRAVENOUS; SUBCUTANEOUS EVERY 6 HOURS
Refills: 0 | Status: DISCONTINUED | OUTPATIENT
Start: 2023-04-24 | End: 2023-04-26

## 2023-04-24 RX ORDER — LANOLIN/MINERAL OIL
0 LOTION (ML) TOPICAL
Refills: 0 | DISCHARGE

## 2023-04-24 RX ORDER — LANOLIN ALCOHOL/MO/W.PET/CERES
3 CREAM (GRAM) TOPICAL AT BEDTIME
Refills: 0 | Status: DISCONTINUED | OUTPATIENT
Start: 2023-04-24 | End: 2023-05-17

## 2023-04-24 RX ORDER — HEPARIN SODIUM 5000 [USP'U]/ML
INJECTION INTRAVENOUS; SUBCUTANEOUS
Qty: 25000 | Refills: 0 | Status: DISCONTINUED | OUTPATIENT
Start: 2023-04-24 | End: 2023-04-27

## 2023-04-24 RX ORDER — METOPROLOL TARTRATE 50 MG
1 TABLET ORAL
Refills: 0 | DISCHARGE

## 2023-04-24 RX ORDER — INSULIN LISPRO 100/ML
VIAL (ML) SUBCUTANEOUS
Refills: 0 | Status: DISCONTINUED | OUTPATIENT
Start: 2023-04-24 | End: 2023-05-17

## 2023-04-24 RX ORDER — HEPARIN SODIUM 5000 [USP'U]/ML
3000 INJECTION INTRAVENOUS; SUBCUTANEOUS EVERY 6 HOURS
Refills: 0 | Status: DISCONTINUED | OUTPATIENT
Start: 2023-04-24 | End: 2023-04-26

## 2023-04-24 RX ORDER — PANTOPRAZOLE SODIUM 20 MG/1
40 TABLET, DELAYED RELEASE ORAL
Refills: 0 | Status: DISCONTINUED | OUTPATIENT
Start: 2023-04-24 | End: 2023-05-17

## 2023-04-24 RX ORDER — ASPIRIN/CALCIUM CARB/MAGNESIUM 324 MG
81 TABLET ORAL DAILY
Refills: 0 | Status: DISCONTINUED | OUTPATIENT
Start: 2023-04-24 | End: 2023-05-17

## 2023-04-24 RX ORDER — DEXTROSE 50 % IN WATER 50 %
12.5 SYRINGE (ML) INTRAVENOUS ONCE
Refills: 0 | Status: DISCONTINUED | OUTPATIENT
Start: 2023-04-24 | End: 2023-05-17

## 2023-04-24 RX ORDER — SODIUM CHLORIDE 9 MG/ML
1000 INJECTION, SOLUTION INTRAVENOUS
Refills: 0 | Status: DISCONTINUED | OUTPATIENT
Start: 2023-04-24 | End: 2023-05-17

## 2023-04-24 RX ORDER — HEPARIN SODIUM 5000 [USP'U]/ML
6500 INJECTION INTRAVENOUS; SUBCUTANEOUS ONCE
Refills: 0 | Status: COMPLETED | OUTPATIENT
Start: 2023-04-24 | End: 2023-04-24

## 2023-04-24 RX ORDER — INSULIN GLARGINE 100 [IU]/ML
14 INJECTION, SOLUTION SUBCUTANEOUS AT BEDTIME
Refills: 0 | Status: DISCONTINUED | OUTPATIENT
Start: 2023-04-24 | End: 2023-04-24

## 2023-04-24 RX ORDER — BUMETANIDE 0.25 MG/ML
2 INJECTION INTRAMUSCULAR; INTRAVENOUS ONCE
Refills: 0 | Status: COMPLETED | OUTPATIENT
Start: 2023-04-24 | End: 2023-04-24

## 2023-04-24 RX ORDER — INSULIN GLARGINE 100 [IU]/ML
0 INJECTION, SOLUTION SUBCUTANEOUS
Refills: 0 | DISCHARGE

## 2023-04-24 RX ORDER — INSULIN LISPRO 100/ML
4 VIAL (ML) SUBCUTANEOUS
Refills: 0 | Status: DISCONTINUED | OUTPATIENT
Start: 2023-04-24 | End: 2023-05-04

## 2023-04-24 RX ORDER — SODIUM BICARBONATE 1 MEQ/ML
650 SYRINGE (ML) INTRAVENOUS DAILY
Refills: 0 | Status: DISCONTINUED | OUTPATIENT
Start: 2023-04-24 | End: 2023-05-09

## 2023-04-24 RX ORDER — INSULIN LISPRO 100/ML
VIAL (ML) SUBCUTANEOUS AT BEDTIME
Refills: 0 | Status: DISCONTINUED | OUTPATIENT
Start: 2023-04-24 | End: 2023-05-17

## 2023-04-24 RX ORDER — METOPROLOL TARTRATE 50 MG
100 TABLET ORAL EVERY 12 HOURS
Refills: 0 | Status: DISCONTINUED | OUTPATIENT
Start: 2023-04-24 | End: 2023-05-17

## 2023-04-24 RX ADMIN — Medication 650 MILLIGRAM(S): at 22:59

## 2023-04-24 RX ADMIN — BUMETANIDE 2 MILLIGRAM(S): 0.25 INJECTION INTRAMUSCULAR; INTRAVENOUS at 13:28

## 2023-04-24 RX ADMIN — INSULIN GLARGINE 7 UNIT(S): 100 INJECTION, SOLUTION SUBCUTANEOUS at 22:48

## 2023-04-24 RX ADMIN — Medication 650 MILLIGRAM(S): at 23:44

## 2023-04-24 RX ADMIN — HEPARIN SODIUM 1500 UNIT(S)/HR: 5000 INJECTION INTRAVENOUS; SUBCUTANEOUS at 19:06

## 2023-04-24 RX ADMIN — ATORVASTATIN CALCIUM 40 MILLIGRAM(S): 80 TABLET, FILM COATED ORAL at 22:32

## 2023-04-24 RX ADMIN — HEPARIN SODIUM 6500 UNIT(S): 5000 INJECTION INTRAVENOUS; SUBCUTANEOUS at 19:04

## 2023-04-24 RX ADMIN — Medication 100 MILLIGRAM(S): at 20:13

## 2023-04-24 NOTE — H&P ADULT - PROBLEM SELECTOR PLAN 6
A1c 7.8  Home basaglar 21/novolog 7 TID AC  - Here will restart diet and basal at 14/bolus at 4 TID AC + ISS

## 2023-04-24 NOTE — PLAN
[FreeTextEntry1] : 1. DM \par Lantus 21 U QHS\par Humalog 7 u TID with meals \par 2. Htn/ CAD / CABG \par cont Metoprolol 100 mg BID\par cont statins\par 3, DVT \par on Eliquis \par 4. CKD\par will monitor renal function\par 5. Choledocholithiasis \par awaiting ERCP \par Can hold Eliquis for 2 days prior to procedure\par cont ASA 81 mg QD\par Hospital record reviewed

## 2023-04-24 NOTE — ED PROVIDER NOTE - PROGRESS NOTE DETAILS
patient resting comfortably at this time, appears to have much less labored breathing on bipap. - Hillary Robertson PA-C

## 2023-04-24 NOTE — ED PROVIDER NOTE - IV ALTEPLASE DOOR HIDDEN
show Post-care instructions were reviewed in detail with the patient. A detailed post-care handout was also given. Patient is not to engage in any heavy lifting, vigorous exercise, or swimming for the next 14 days. Should the patient develop any fevers, chills, bleeding, severe pain, or the wound becomes increasingly tender, warm, swollen, red, drains a puss-like thick substance, or develops a strong odor, patient will contact the office immediately.

## 2023-04-24 NOTE — ED PROVIDER NOTE - CLINICAL SUMMARY MEDICAL DECISION MAKING FREE TEXT BOX
83-year-old female with PMH of CKD, DM 2, HTN, HLD, CHF, DVT on Eliquis set to have ERCP in 2 days and was told to hold her Eliquis presents with shortness of breath associated with worsening pedal edema.  Patient is currently on Bumex for diuretic therapy.  On exam patient slightly tachypneic with 3+ bilateral pitting edema and crackles heard at the bases.  Bedside ultrasound shows preserved EF without significant pericardial effusion with diffuse B-lines consistent with pulmonary edema.  Patient presentation was consistent with CHF, low clinical suspicion for PE and patient will need diuresis and admission.  Patient placed on BiPAP in the ED to help with work of breathing.  Cardiac biomarkers with basic labs ordered as well as a chest x-ray.

## 2023-04-24 NOTE — H&P ADULT - HISTORY OF PRESENT ILLNESS
Ms. Batista is a 82 YO woman with PMH of HTN, HLD, DM2, HFpEF (EF 65%), CAD s/p CABG, Breast CA s/p R partial mastectomy, rectal CA s/p resection with recent admission for acute rissa s/p ERCP w/ stent c/b gallbladder perf w/ perc rissa and recurrent SVT, presenting for shortness of breath. Ms. Batista is a 82 YO woman with PMH of HTN, HLD, DM2, HFpEF (EF 65%), CAD s/p CABG, Breast CA s/p R partial mastectomy, rectal CA s/p resection with previous admission for acute rissa s/p ERCP w/ stent c/b gallbladder perf w/ perc rissa and recurrent SVT, and recent admission for CHF exacerbation presenting for shortness of breath since last night. Per her daughter, she gained between 8-12 pounds since discharge from [place] [facility] on 4/14. She denies worsening of shortness of breath, when lying flat. She denies cough or sore throat.  Ms. Batista is a 82 YO woman with PMH of HTN, HLD, DM2, HFpEF (EF 65%), CAD s/p CABG, Breast CA s/p R partial mastectomy, rectal CA s/p resection with previous admission for acute rissa s/p ERCP w/ stent c/b gallbladder perf w/ perc rissa and recurrent SVT, and recent admission for CHF exacerbation presenting for shortness of breath since last night. Per her daughter, she gained between 8-12 pounds since discharge from [place] [facility] on 4/14. She denies worsening of shortness of breath when lying flat. She denies cough or sore throat. Her daughter notes she was complaining of nausea, dizziness and "fluid in her ears," no falls.

## 2023-04-24 NOTE — ED PROVIDER NOTE - CARDIAC, MLM
Normal rate, regular rhythm.  Heart sounds S1, S2.  No murmurs, rubs or gallops. no significant pitting LE edema

## 2023-04-24 NOTE — ED PROVIDER NOTE - NSICDXPASTMEDICALHX_GEN_ALL_CORE_FT
PAST MEDICAL HISTORY:  Breast CA, right 1989 s/p mastectomy ,IV Chemotherapy    CHF (congestive heart failure)     DVT of leg (deep venous thrombosis) right calf DVT  2/2019 pt on Eliquis    Gallstone     HTN (hypertension)     Hyperlipidemia     Lymphedema of arm right arm s/p mastectomy    Obese     Rectal cancer s/p chemotherapy and  radiation 12 weeks 2/2015 -3/2015    Renal insufficiency     Type II diabetes mellitus

## 2023-04-24 NOTE — ED PROVIDER NOTE - ATTENDING APP SHARED VISIT CONTRIBUTION OF CARE
I, Jose Engel, performed a history and physical exam of the patient and discussed their management with the resident and /or advanced care provider. I reviewed the resident and /or ACP's note and agree with the documented findings and plan of care. I was present and available for all procedures.

## 2023-04-24 NOTE — HISTORY OF PRESENT ILLNESS
[de-identified] : Fernanda Spivey is a 83 year old female, accompanied by her daughter with a history of HTN, hyperlipidemia, type 2 diabetes mellitus, breast cancer status post right partial mastectomy, rectal carcinoma status post resection, anemia, diastolic heart failure, right lower extremity DVT, CAD s/p 2 vessel CABG 11/9/2020 and renal insufficiency who presents today for hospital follow up.\par \par Pt. states 01/18/2023 pt. was hospitalized for gall bladder attack. She was hospitalized for three weeks, discharged 02/07/2023 to [facility]. \par While at [facility] on 03/22/2023 she went back to hospital for SOB, was admitted for one week d/t the diagnosis of Chronic Congestive Heart failure, pt. was discharged 03/30/2023 back to [facility]. \par She was discharged from [facility] to home this past Friday, on 04/14/2023.\par She is scheduled to go back for ERCP, next week, on, Wednesday, tomorrow she has pre-surgery evaluation at the hospital. \par Was told to see PCP and Cardiologist d/t the anticoagulants she is on prior to surgery.\par Pt. wants to be changed from insulin injections that she was on since 01/18/2023, while admitted to oral medications. Has some dizziness , daughter states which may be r/t that she does not eat regularly.\par In the last three days has had back pain, rated 9-10, pt. has been using lidocaine patches, that have not been helping. \par Denies CP, SOB, N/V/D, HA, lightheadedness.\par \par

## 2023-04-24 NOTE — ED ADULT NURSE NOTE - OBJECTIVE STATEMENT
83 yr old female with h/o chf, lung CA with partial r lobectomy, high bp, diabetes came in with sob since last night. also has lymphoderma of right arm. on assessment a and o x 3 lung have some wheezing, abd large round and soft no pitting edema in bilat legs denies fevers or chest pain, skin dry warm and intact, can ambulate.

## 2023-04-24 NOTE — H&P ADULT - NSHPREVIEWOFSYSTEMS_GEN_ALL_CORE
CONSTITUTIONAL: No fevers, chills, fatigue, dizziness, weakness, unexpected weight change  EYES: No double vision, blurry vision  ENT: No ear pain, nasal congestion, runny nose, sore throat  CV: No chest pain, palpitations  PULM: No cough, shortness of breath  GI: No abdominal pain, nausea, vomiting, diarrhea, constipation  : No dysuria, polyuria, hematuria  SKIN: No rashes, swelling  MSK: No muscle aches  NEURO: No headache, paresthesias  PSYCHIATRIC: Denies suicidal, homicidal ideations. No auditory, visual, tactile hallucinations CONSTITUTIONAL: +Dizziness. No fevers, chills, fatigue, weakness, unexpected weight change  EYES: No double vision, blurry vision  ENT: +?Fluid in ears. No ear pain, nasal congestion, runny nose, sore throat  CV: No chest pain, palpitations  PULM: +Shortness of breath  GI: +Nausea. No abdominal pain, vomiting, diarrhea, constipation  : No dysuria, polyuria, hematuria  SKIN: No rashes, swelling  MSK: No muscle aches  NEURO: No headache, paresthesias  PSYCHIATRIC: Denies suicidal, homicidal ideations. No auditory, visual, tactile hallucinations

## 2023-04-24 NOTE — H&P ADULT - ATTENDING COMMENTS
82 YO woman with PMH of HTN, HLD, DM2, HFpEF (EF 65%), CAD s/p CABG, Breast CA s/p R partial mastectomy, rectal CA s/p resection with recent admission for acute rissa s/p ERCP w/ stent c/b gallbladder perf w/ perc rissa and recurrent SVT, presenting for shortness of breath,     #Acute on chronic CHF exacerbation  #Acute hypoxic respiratory failure   #H/o cholecystitis   #CKD  #DM  #Chronic Afib  #HTN  #Anemia    - presented due to SOB; s/p bumex 2mg IV in ED and placed on BiPAP; appears to be improving overall  - continue with bumex 2mg daily for now (patient is on 1mg at home)  - strict I/O; daily weight; cardio consult  - wean off BiPAP as tolerated   - GI consult for possible ERCP in patient; patient scheduled as outpatient 4/26  - hold eliquis for now for possible procedure; heparin drip for now  - anemia panel     Rest as above. Discussed with HS.

## 2023-04-24 NOTE — H&P ADULT - NSHPPHYSICALEXAM_GEN_ALL_CORE
GENERAL:   EYES: Conjunctiva noninjected or pale, sclera anicteric  HENT: NC/AT, moist mucous membranes  NECK: Supple, trachea midline  LUNG: Nonlabored respirations, no wheezes, rales  CV: RRR, Pulses- Radial: 2+ b/l  ABDOMEN: Nondistended, nontender  MSK: No visible deformities, nontender extremities  SKIN: No rashes, bruises  NEURO: AAOx4 (to person, place, time, event), no tremor, steady gait  PSYCH: Normal mood and affect GENERAL: Lying in bed in no acute distress, wearing BIPAP  EYES: Conjunctiva noninjected or pale, sclera anicteric  HENT: NC/AT, moist mucous membranes  NECK: Supple, trachea midline  LUNG: Nonlabored respirations, no wheezes, rales  CV: RRR, Pulses- Radial: 2+ b/l. Bilateral mild lower extremity edema.  ABDOMEN: Nondistended, nontender  MSK: No visible deformities, nontender extremities.  SKIN: No rashes, bruises  NEURO: AAOx4 (to person, place, time, event), no tremor, steady gait  PSYCH: Normal mood and affect

## 2023-04-24 NOTE — H&P ADULT - PROBLEM SELECTOR PLAN 8
DVT: Per daughter, history of VTE, and history of afib, will hold Eliquis for possible procedure and start heparin drip  Dispo: PT  Diet: CC/DASH diet

## 2023-04-24 NOTE — ED ADULT NURSE REASSESSMENT NOTE - NS ED NURSE REASSESS COMMENT FT1
Holding RN RYAN Lees made aware with inpatient provider Shauna Munoz aware pt has single access site running heparin. Provider is sending IV RN to place additional access for patient.
bipap intact, pt denies c/o, +appears comfortable, vitals stable, +adm, bed pending
bipap donned (10/5, Fio2: 40%) by RT, purewick in place, bumex administered, NSR on monitor, pt resting comfortably, denies c/o, +severely diminished breath sounds bilaterally

## 2023-04-24 NOTE — ED PROVIDER NOTE - OBJECTIVE STATEMENT
83 old female history of renal insufficiency, DM 2, HTN, HLD, CHF, DVT on eliquis recent admission for cholecystectomy with planned ERCP for 4/26 therefore not on AC since yesterday presenting to the emergency department from home for shortness of breath.  Patient is on Bumex which she is compliant with.  Patient also endorsing that the shortness of breath is worse with exertion but she does feel it at all times.  Patient denies chest pain.  Patient denies fever, chills, cough, infectious type symptoms.  On speaking with her daughter via telephone she also endorses an 8 pound weight gain since discharge from the hospital.  Of note patient also not taking sodium tablets secondary to planned procedure.   line [id number] Taryn.

## 2023-04-24 NOTE — H&P ADULT - PROBLEM SELECTOR PLAN 3
Normocytic anemia - likely 2/2 CKD  - Iron studies, B12, folate - ERCP was planned for outpatient tomorrow  - Will touch base with GI about whether can be done inpatient - ERCP was planned for outpatient 4/26  - Will touch base with GI about whether can be done inpatient

## 2023-04-24 NOTE — H&P ADULT - PROBLEM SELECTOR PLAN 1
ProBNP 2505, TTE 3/23 with EF 76%, severe pulmonary hypertension, moderate diastolic dysfunction. POCUS done in ED cw pulmonary edema  - Diuresis  - ProBNP 2505, TTE 3/23 with EF 76%, severe pulmonary hypertension, moderate diastolic dysfunction. POCUS done in ED cw pulmonary edema  - Diuresis with Bumex 2 mg qD  - Strict I&Os  - Daily weights

## 2023-04-24 NOTE — PHYSICAL EXAM
[No Acute Distress] : no acute distress [Well Nourished] : well nourished [Normal Sclera/Conjunctiva] : normal sclera/conjunctiva [PERRL] : pupils equal round and reactive to light [EOMI] : extraocular movements intact [Normal Outer Ear/Nose] : the outer ears and nose were normal in appearance [Normal Oropharynx] : the oropharynx was normal [No JVD] : no jugular venous distention [No Lymphadenopathy] : no lymphadenopathy [Supple] : supple [Thyroid Normal, No Nodules] : the thyroid was normal and there were no nodules present [No Respiratory Distress] : no respiratory distress  [No Accessory Muscle Use] : no accessory muscle use [Clear to Auscultation] : lungs were clear to auscultation bilaterally [Normal Rate] : normal rate  [Regular Rhythm] : with a regular rhythm [Normal S1, S2] : normal S1 and S2 [No Murmur] : no murmur heard [No Carotid Bruits] : no carotid bruits [Pedal Pulses Present] : the pedal pulses are present [No Palpable Aorta] : no palpable aorta [No Extremity Clubbing/Cyanosis] : no extremity clubbing/cyanosis [Soft] : abdomen soft [Non Tender] : non-tender [Non-distended] : non-distended [Normal Bowel Sounds] : normal bowel sounds [No CVA Tenderness] : no CVA  tenderness [No Spinal Tenderness] : no spinal tenderness [No Joint Swelling] : no joint swelling [Grossly Normal Strength/Tone] : grossly normal strength/tone [No Rash] : no rash [Coordination Grossly Intact] : coordination grossly intact [No Focal Deficits] : no focal deficits [Normal Affect] : the affect was normal [Normal Insight/Judgement] : insight and judgment were intact [de-identified] :  + varicose/spider  vein, Left arm lymphedema  [de-identified] : + LE hyperpigmentation, dry/flaky  [de-identified] : ambulates with walker

## 2023-04-24 NOTE — H&P ADULT - ASSESSMENT
Ms. Batista is a 82 YO woman with PMH of HTN, HLD, DM2, HFpEF (EF 65%), CAD s/p CABG, Breast CA s/p R partial mastectomy, rectal CA s/p resection with recent admission for acute rissa s/p ERCP w/ stent c/b gallbladder perf w/ perc rissa and recurrent SVT, presenting for shortness of breath, POCUS of lungs c/w pulmonary edema, proBNP 2505.       ****** PLAN TENTATIVE UNTIL DISCUSSED WITH ATTENDING ********* Ms. Batista is a 82 YO woman with PMH of HTN, HLD, DM2, HFpEF (EF 65%), CAD s/p CABG, Breast CA s/p R partial mastectomy, rectal CA s/p resection with recent admission for acute rissa s/p ERCP w/ stent c/b gallbladder perf w/ perc rissa and recurrent SVT, presenting for shortness of breath, POCUS of lungs c/w pulmonary edema, proBNP 2505.

## 2023-04-24 NOTE — H&P ADULT - NSHPLABSRESULTS_GEN_ALL_CORE
LABS: When present labs, imaging, and ECG were personally reviewed                          9.6    8.19  )-----------( 403      ( 24 Apr 2023 12:07 )             32.8       04-24    139  |  103  |  42<H>  ----------------------------<  258<H>  4.7   |  25  |  1.74<H>    Ca    8.8      24 Apr 2023 12:07    TPro  7.3  /  Alb  3.4  /  TBili  0.2  /  DBili  x   /  AST  46<H>  /  ALT  37  /  AlkPhos  139<H>  04-24       LIVER FUNCTIONS - ( 24 Apr 2023 12:07 )  Alb: 3.4 g/dL / Pro: 7.3 g/dL / ALK PHOS: 139 U/L / ALT: 37 U/L / AST: 46 U/L / GGT: x                        PT/INR - ( 24 Apr 2023 12:07 )   PT: 12.8 sec;   INR: 1.11 ratio         PTT - ( 24 Apr 2023 12:07 )  PTT:29.2 sec    Lactate Trend            CAPILLARY BLOOD GLUCOSE                RADIOLOGY & ADDITIONAL TESTS:

## 2023-04-25 LAB
ANION GAP SERPL CALC-SCNC: 10 MMOL/L — SIGNIFICANT CHANGE UP (ref 5–17)
APTT BLD: 186.2 SEC — CRITICAL HIGH (ref 27.5–35.5)
APTT BLD: 59.7 SEC — HIGH (ref 27.5–35.5)
APTT BLD: 66.7 SEC — HIGH (ref 27.5–35.5)
BUN SERPL-MCNC: 32 MG/DL — HIGH (ref 7–23)
CALCIUM SERPL-MCNC: 9.2 MG/DL — SIGNIFICANT CHANGE UP (ref 8.4–10.5)
CHLORIDE SERPL-SCNC: 99 MMOL/L — SIGNIFICANT CHANGE UP (ref 96–108)
CO2 SERPL-SCNC: 30 MMOL/L — SIGNIFICANT CHANGE UP (ref 22–31)
CREAT SERPL-MCNC: 1.86 MG/DL — HIGH (ref 0.5–1.3)
EGFR: 26 ML/MIN/1.73M2 — LOW
FERRITIN SERPL-MCNC: 28 NG/ML — SIGNIFICANT CHANGE UP (ref 15–150)
FOLATE SERPL-MCNC: 8.5 NG/ML — SIGNIFICANT CHANGE UP
GLUCOSE BLDC GLUCOMTR-MCNC: 123 MG/DL — HIGH (ref 70–99)
GLUCOSE BLDC GLUCOMTR-MCNC: 202 MG/DL — HIGH (ref 70–99)
GLUCOSE BLDC GLUCOMTR-MCNC: 203 MG/DL — HIGH (ref 70–99)
GLUCOSE BLDC GLUCOMTR-MCNC: 233 MG/DL — HIGH (ref 70–99)
GLUCOSE SERPL-MCNC: 201 MG/DL — HIGH (ref 70–99)
HCT VFR BLD CALC: 31.5 % — LOW (ref 34.5–45)
HCT VFR BLD CALC: 32.6 % — LOW (ref 34.5–45)
HGB BLD-MCNC: 9.1 G/DL — LOW (ref 11.5–15.5)
HGB BLD-MCNC: 9.6 G/DL — LOW (ref 11.5–15.5)
IRON SATN MFR SERPL: 22 UG/DL — LOW (ref 30–160)
IRON SATN MFR SERPL: 7 % — LOW (ref 14–50)
MAGNESIUM SERPL-MCNC: 1.6 MG/DL — SIGNIFICANT CHANGE UP (ref 1.6–2.6)
MCHC RBC-ENTMCNC: 26.8 PG — LOW (ref 27–34)
MCHC RBC-ENTMCNC: 27 PG — SIGNIFICANT CHANGE UP (ref 27–34)
MCHC RBC-ENTMCNC: 28.9 GM/DL — LOW (ref 32–36)
MCHC RBC-ENTMCNC: 29.4 GM/DL — LOW (ref 32–36)
MCV RBC AUTO: 91.8 FL — SIGNIFICANT CHANGE UP (ref 80–100)
MCV RBC AUTO: 92.9 FL — SIGNIFICANT CHANGE UP (ref 80–100)
NRBC # BLD: 0 /100 WBCS — SIGNIFICANT CHANGE UP (ref 0–0)
NRBC # BLD: 0 /100 WBCS — SIGNIFICANT CHANGE UP (ref 0–0)
PHOSPHATE SERPL-MCNC: 3.1 MG/DL — SIGNIFICANT CHANGE UP (ref 2.5–4.5)
PLATELET # BLD AUTO: 370 K/UL — SIGNIFICANT CHANGE UP (ref 150–400)
PLATELET # BLD AUTO: 387 K/UL — SIGNIFICANT CHANGE UP (ref 150–400)
POTASSIUM SERPL-MCNC: 4.4 MMOL/L — SIGNIFICANT CHANGE UP (ref 3.5–5.3)
POTASSIUM SERPL-SCNC: 4.4 MMOL/L — SIGNIFICANT CHANGE UP (ref 3.5–5.3)
RBC # BLD: 3.39 M/UL — LOW (ref 3.8–5.2)
RBC # BLD: 3.55 M/UL — LOW (ref 3.8–5.2)
RBC # FLD: 15.2 % — HIGH (ref 10.3–14.5)
RBC # FLD: 15.3 % — HIGH (ref 10.3–14.5)
SODIUM SERPL-SCNC: 139 MMOL/L — SIGNIFICANT CHANGE UP (ref 135–145)
TIBC SERPL-MCNC: 298 UG/DL — SIGNIFICANT CHANGE UP (ref 220–430)
UIBC SERPL-MCNC: 276 UG/DL — SIGNIFICANT CHANGE UP (ref 110–370)
VIT B12 SERPL-MCNC: 620 PG/ML — SIGNIFICANT CHANGE UP (ref 232–1245)
WBC # BLD: 8.52 K/UL — SIGNIFICANT CHANGE UP (ref 3.8–10.5)
WBC # BLD: 9.25 K/UL — SIGNIFICANT CHANGE UP (ref 3.8–10.5)
WBC # FLD AUTO: 8.52 K/UL — SIGNIFICANT CHANGE UP (ref 3.8–10.5)
WBC # FLD AUTO: 9.25 K/UL — SIGNIFICANT CHANGE UP (ref 3.8–10.5)

## 2023-04-25 PROCEDURE — 99232 SBSQ HOSP IP/OBS MODERATE 35: CPT | Mod: GC

## 2023-04-25 RX ORDER — FERROUS SULFATE 325(65) MG
325 TABLET ORAL DAILY
Refills: 0 | Status: DISCONTINUED | OUTPATIENT
Start: 2023-04-25 | End: 2023-05-17

## 2023-04-25 RX ORDER — POLYETHYLENE GLYCOL 3350 17 G/17G
17 POWDER, FOR SOLUTION ORAL DAILY
Refills: 0 | Status: DISCONTINUED | OUTPATIENT
Start: 2023-04-25 | End: 2023-05-17

## 2023-04-25 RX ORDER — SENNA PLUS 8.6 MG/1
2 TABLET ORAL AT BEDTIME
Refills: 0 | Status: DISCONTINUED | OUTPATIENT
Start: 2023-04-25 | End: 2023-05-17

## 2023-04-25 RX ADMIN — BUMETANIDE 2 MILLIGRAM(S): 0.25 INJECTION INTRAMUSCULAR; INTRAVENOUS at 06:05

## 2023-04-25 RX ADMIN — Medication 100 MILLIGRAM(S): at 17:13

## 2023-04-25 RX ADMIN — Medication 2: at 17:13

## 2023-04-25 RX ADMIN — PANTOPRAZOLE SODIUM 40 MILLIGRAM(S): 20 TABLET, DELAYED RELEASE ORAL at 06:06

## 2023-04-25 RX ADMIN — HEPARIN SODIUM 0 UNIT(S)/HR: 5000 INJECTION INTRAVENOUS; SUBCUTANEOUS at 05:32

## 2023-04-25 RX ADMIN — INSULIN GLARGINE 7 UNIT(S): 100 INJECTION, SOLUTION SUBCUTANEOUS at 21:12

## 2023-04-25 RX ADMIN — Medication 4 UNIT(S): at 17:13

## 2023-04-25 RX ADMIN — ATORVASTATIN CALCIUM 40 MILLIGRAM(S): 80 TABLET, FILM COATED ORAL at 21:13

## 2023-04-25 RX ADMIN — Medication 100 MILLIGRAM(S): at 06:05

## 2023-04-25 RX ADMIN — Medication 81 MILLIGRAM(S): at 11:48

## 2023-04-25 RX ADMIN — Medication 4 UNIT(S): at 08:14

## 2023-04-25 RX ADMIN — HEPARIN SODIUM 1200 UNIT(S)/HR: 5000 INJECTION INTRAVENOUS; SUBCUTANEOUS at 22:04

## 2023-04-25 RX ADMIN — HEPARIN SODIUM 1200 UNIT(S)/HR: 5000 INJECTION INTRAVENOUS; SUBCUTANEOUS at 06:30

## 2023-04-25 RX ADMIN — GABAPENTIN 200 MILLIGRAM(S): 400 CAPSULE ORAL at 11:48

## 2023-04-25 RX ADMIN — Medication 4 UNIT(S): at 11:47

## 2023-04-25 RX ADMIN — Medication 2: at 11:47

## 2023-04-25 RX ADMIN — SENNA PLUS 2 TABLET(S): 8.6 TABLET ORAL at 21:12

## 2023-04-25 RX ADMIN — HEPARIN SODIUM 1200 UNIT(S)/HR: 5000 INJECTION INTRAVENOUS; SUBCUTANEOUS at 19:19

## 2023-04-25 RX ADMIN — HEPARIN SODIUM 1200 UNIT(S)/HR: 5000 INJECTION INTRAVENOUS; SUBCUTANEOUS at 15:06

## 2023-04-25 RX ADMIN — HEPARIN SODIUM 1200 UNIT(S)/HR: 5000 INJECTION INTRAVENOUS; SUBCUTANEOUS at 07:34

## 2023-04-25 NOTE — CONSULT NOTE ADULT - SUBJECTIVE AND OBJECTIVE BOX
Patient is a 83y old  Female who presents with a chief complaint of Shortness of breath (25 Apr 2023 07:21)      HPI:  Ms. Batista is a 82 YO woman with PMH of HTN, HLD, DM2, HFpEF (EF 65%), CAD s/p CABG, Breast CA s/p R partial mastectomy, rectal CA s/p resection with previous admission for acute rissa s/p ERCP w/ stent c/b gallbladder perf w/ perc rissa and recurrent SVT, and recent admission for CHF exacerbation presenting for shortness of breath since last night. Per her daughter, she gained between 8-12 pounds since discharge from [facility] on 4/14. She denies worsening of shortness of breath when lying flat. She denies cough or sore throat. Her daughter notes she was complaining of nausea, dizziness and "fluid in her ears," no falls.  (24 Apr 2023 15:05)      PAST MEDICAL & SURGICAL HISTORY:  Breast CA, right  1989 s/p mastectomy ,IV Chemotherapy  HTN (hypertension)  Lymphedema of arm  right arm s/p mastectomy    Hyperlipidemia      Rectal cancer  s/p chemotherapy and  radiation 12 weeks 2/2015 -3/2015  Obese  Type II diabetes mellitus  CHF (congestive heart failure)  DVT of leg (deep venous thrombosis)  right calf DVT  2/2019 pt on Eliquis  Renal insufficiencyGallstone  S/P mastectomy, right  1989  History of appendectomy  1953  S/P ileostomy  low anterior resection-8/10/15, reversal of ileostomy 2016  S/P colon resection  2015      S/P TKR (total knee replacement), right  2017      S/P hip replacement, right      Herniated lumbar intervertebral disc          MEDICATIONS  (STANDING):  aspirin  chewable 81 milliGRAM(s) Oral daily  atorvastatin 40 milliGRAM(s) Oral at bedtime  buMETAnide 2 milliGRAM(s) Oral daily  dextrose 5%. 1000 milliLiter(s) (100 mL/Hr) IV Continuous <Continuous>  dextrose 5%. 1000 milliLiter(s) (50 mL/Hr) IV Continuous <Continuous>  dextrose 50% Injectable 25 Gram(s) IV Push once  dextrose 50% Injectable 12.5 Gram(s) IV Push once  dextrose 50% Injectable 25 Gram(s) IV Push once  gabapentin 200 milliGRAM(s) Oral daily  glucagon  Injectable 1 milliGRAM(s) IntraMuscular once  heparin  Infusion.  Unit(s)/Hr (15 mL/Hr) IV Continuous <Continuous>  insulin glargine Injectable (LANTUS) 7 Unit(s) SubCutaneous at bedtime  insulin lispro (ADMELOG) corrective regimen sliding scale   SubCutaneous three times a day before meals  insulin lispro (ADMELOG) corrective regimen sliding scale   SubCutaneous at bedtime  insulin lispro Injectable (ADMELOG) 4 Unit(s) SubCutaneous three times a day before meals  metoprolol tartrate 100 milliGRAM(s) Oral every 12 hours  pantoprazole    Tablet 40 milliGRAM(s) Oral before breakfast  sodium bicarbonate 650 milliGRAM(s) Oral daily      Allergies    CT scan dye (Hives)  penicillin (Unknown)    Intolerances        SOCIAL HISTORY:  Denies ETOh,Smoking,     FAMILY HISTORY:  Family history of diabetes mellitus in mother    Family history of diabetes mellitus in son    Family history of heart attack (Child)        REVIEW OF SYSTEMS:  CONSTITUTIONAL: No weakness, fevers or chills  EYES/ENT: No visual changes;  No vertigo or throat pain   NECK: No pain or stiffness  RESPIRATORY: No cough, wheezing, hemoptysis; No shortness of breath  CARDIOVASCULAR: No chest pain or palpitations  GASTROINTESTINAL: No abdominal or epigastric pain. No nausea, vomiting, or hematemesis; No diarrhea or constipation. No melena or hematochezia.  GENITOURINARY: No dysuria, frequency or hematuria  NEUROLOGICAL: No numbness or weakness  SKIN: No itching, burning, rashes, or lesions   All other review of systems is negative unless indicated above.    VITAL:  T(C): , Max: 37.3 (04-24-23 @ 21:57)  T(F): , Max: 99.1 (04-24-23 @ 21:57)  HR: 62 (04-25-23 @ 04:29)  BP: 124/80 (04-25-23 @ 04:29)  BP(mean): --  RR: 18 (04-25-23 @ 04:29)  SpO2: 99% (04-25-23 @ 04:29)  Wt(kg): --    PHYSICAL EXAM:  Constitutional: NAD, Alert  HEENT: NCAT, MMM  Neck: Supple, No JVD  Respiratory: CTA-b/l  Cardiovascular: RRR s1s2, no m/r/g  Gastrointestinal: BS+, soft, NT/ND  Extremities: edema b/l  Neurological: no focal deficits; strength grossly intact  Back: no CVAT b/l  Skin: No rashes, no nevi    LABS:                        9.6    8.52  )-----------( 387      ( 25 Apr 2023 04:37 )             32.6     Na(139)/K(4.7)/Cl(103)/HCO3(25)/BUN(42)/Cr(1.74)Glu(258)/Ca(8.8)/Mg(--)/PO4(--)    04-24 @ 12:07            IMAGING:    ASSESSMENT:  Ms. Batista is a 82 YO woman with PMH of HTN, HLD, DM2, HFpEF (EF 65%), CAD s/p CABG, Breast CA s/p R partial mastectomy, rectal CA s/p resection with previous admission for acute rissa s/p ERCP w/ stent c/b gallbladder perf w/ perc rissa and recurrent SVT, and recent admission for CHF exacerbation presenting for shortness of breath.      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  on bumex 2 mg PO daily   keep k ~4, mag 2   avoid nephrotoxic medication  dose all medications for  GFR  30-35   ml/min  daily bmp , mag and Po4   renal diet   monitor I/Os  2)CVS: continue BP meds as ordered for now              Thank you for involving [facility] in this patient's care.    With warm regards    Brenda Rader  [facility]  Office: [phone]

## 2023-04-25 NOTE — PROGRESS NOTE ADULT - PROBLEM SELECTOR PLAN 3
- ERCP was planned for outpatient 4/26  - Will touch base with GI about whether can be done inpatient - ERCP was planned for outpatient 4/26  - GI aware, will await cardiac clearance

## 2023-04-25 NOTE — PHYSICAL THERAPY INITIAL EVALUATION ADULT - PERTINENT HX OF CURRENT PROBLEM, REHAB EVAL
84 YO woman with PMH of HTN, HLD, DM2, HFpEF (EF 65%), CAD s/p CABG, Breast CA s/p R partial mastectomy, rectal CA s/p resection with previous admission for acute rissa s/p ERCP w/ stent c/b gallbladder perf w/ perc rissa and recurrent SVT, and recent admission for CHF exacerbation presenting for shortness of breath since last night. Per her daughter, she gained between 8-12 pounds since discharge from [place] [facility] on 4/14. She denies worsening of shortness of breath when lying flat. She denies cough or sore throat. Her daughter notes she was complaining of nausea, dizziness and "fluid in her ears," no falls. Hosp Course: 4/24 Chest Xray: No focal consolidation. POCUS ED: Diffuse b lines and bilateral pleural effusions suggest pulmonary edema. No global LV systolic hypokinesis.

## 2023-04-25 NOTE — PROGRESS NOTE ADULT - PROBLEM SELECTOR PLAN 2
- Currently on BIPAP 10/5, FiO2 40%  - Will attempt to wean to NC  - Not on home O2 - Currently on 3L NC  - Will attempt to wean to NC  - Not on home O2

## 2023-04-25 NOTE — PATIENT PROFILE ADULT - FALL HARM RISK - HARM RISK INTERVENTIONS

## 2023-04-25 NOTE — PROGRESS NOTE ADULT - PROBLEM SELECTOR PLAN 4
Normocytic anemia - likely 2/2 CKD  - Iron studies, B12, folate Normocytic anemia - likely 2/2 CKD  - Iron studies consistent with MILLY - will start iron supplementation

## 2023-04-25 NOTE — PROGRESS NOTE ADULT - SUBJECTIVE AND OBJECTIVE BOX
Patient is a 83y old  Female who presents with a chief complaint of Shortness of breath (24 Apr 2023 15:05)      SUBJECTIVE / OVERNIGHT EVENTS:    MEDICATIONS  (STANDING):  aspirin  chewable 81 milliGRAM(s) Oral daily  atorvastatin 40 milliGRAM(s) Oral at bedtime  buMETAnide 2 milliGRAM(s) Oral daily  dextrose 5%. 1000 milliLiter(s) (100 mL/Hr) IV Continuous <Continuous>  dextrose 5%. 1000 milliLiter(s) (50 mL/Hr) IV Continuous <Continuous>  dextrose 50% Injectable 25 Gram(s) IV Push once  dextrose 50% Injectable 12.5 Gram(s) IV Push once  dextrose 50% Injectable 25 Gram(s) IV Push once  gabapentin 200 milliGRAM(s) Oral daily  glucagon  Injectable 1 milliGRAM(s) IntraMuscular once  heparin  Infusion.  Unit(s)/Hr (15 mL/Hr) IV Continuous <Continuous>  insulin glargine Injectable (LANTUS) 7 Unit(s) SubCutaneous at bedtime  insulin lispro (ADMELOG) corrective regimen sliding scale   SubCutaneous three times a day before meals  insulin lispro (ADMELOG) corrective regimen sliding scale   SubCutaneous at bedtime  insulin lispro Injectable (ADMELOG) 4 Unit(s) SubCutaneous three times a day before meals  metoprolol tartrate 100 milliGRAM(s) Oral every 12 hours  pantoprazole    Tablet 40 milliGRAM(s) Oral before breakfast  sodium bicarbonate 650 milliGRAM(s) Oral daily    MEDICATIONS  (PRN):  acetaminophen     Tablet .. 650 milliGRAM(s) Oral every 6 hours PRN Temp greater or equal to 38C (100.4F), Mild Pain (1 - 3)  aluminum hydroxide/magnesium hydroxide/simethicone Suspension 30 milliLiter(s) Oral every 4 hours PRN Dyspepsia  dextrose Oral Gel 15 Gram(s) Oral once PRN Blood Glucose LESS THAN 70 milliGRAM(s)/deciliter  heparin   Injectable 6500 Unit(s) IV Push every 6 hours PRN For aPTT less than 40  heparin   Injectable 3000 Unit(s) IV Push every 6 hours PRN For aPTT between 40 - 57  melatonin 3 milliGRAM(s) Oral at bedtime PRN Insomnia      CAPILLARY BLOOD GLUCOSE      POCT Blood Glucose.: 133 mg/dL (24 Apr 2023 22:47)  POCT Blood Glucose.: 94 mg/dL (24 Apr 2023 21:37)    I&O's Summary    24 Apr 2023 07:01  -  25 Apr 2023 07:00  --------------------------------------------------------  IN: 0 mL / OUT: 800 mL / NET: -800 mL        Vital Signs Last 24 Hrs  T(C): 36.4 (25 Apr 2023 04:29), Max: 37.3 (24 Apr 2023 21:57)  T(F): 97.5 (25 Apr 2023 04:29), Max: 99.1 (24 Apr 2023 21:57)  HR: 62 (25 Apr 2023 04:29) (62 - 84)  BP: 124/80 (25 Apr 2023 04:29) (117/66 - 161/66)  BP(mean): --  RR: 18 (25 Apr 2023 04:29) (17 - 20)  SpO2: 99% (25 Apr 2023 04:29) (93% - 100%)    Parameters below as of 25 Apr 2023 04:29  Patient On (Oxygen Delivery Method): room air        PHYSICAL EXAM:  GENERAL: Lying in bed in no acute distress, wearing BIPAP  EYES: Conjunctiva noninjected or pale, sclera anicteric  HENT: NC/AT, moist mucous membranes  NECK: Supple, trachea midline  LUNG: Nonlabored respirations, no wheezes, rales  CV: RRR, Pulses- Radial: 2+ b/l. Bilateral mild lower extremity edema.  ABDOMEN: Nondistended, nontender  MSK: No visible deformities, nontender extremities.  SKIN: No rashes, bruises  NEURO: AAOx4 (to person, place, time, event), no tremor, steady gait  PSYCH: Normal mood and affect    LABS:                        9.6    8.52  )-----------( 387      ( 25 Apr 2023 04:37 )             32.6      04-24    139  |  103  |  42<H>  ----------------------------<  258<H>  4.7   |  25  |  1.74<H>    Ca    8.8      24 Apr 2023 12:07    TPro  7.3  /  Alb  3.4  /  TBili  0.2  /  DBili  x   /  AST  46<H>  /  ALT  37  /  AlkPhos  139<H>  04-24    PT/INR - ( 24 Apr 2023 12:07 )   PT: 12.8 sec;   INR: 1.11 ratio         PTT - ( 25 Apr 2023 04:28 )  PTT:186.2 sec          RADIOLOGY & ADDITIONAL TESTS:    Imaging Personally Reviewed:    Consultant(s) Notes Reviewed:      Care Discussed with Consultants/Other Providers:   Patient is a 83y old  Female who presents with a chief complaint of Shortness of breath (24 Apr 2023 15:05)      SUBJECTIVE / OVERNIGHT EVENTS: No acute events overnight. Patient seen and examined at bedside, main complaint is feeling hot. Feels breathing is improved.     MEDICATIONS  (STANDING):  aspirin  chewable 81 milliGRAM(s) Oral daily  atorvastatin 40 milliGRAM(s) Oral at bedtime  buMETAnide 2 milliGRAM(s) Oral daily  dextrose 5%. 1000 milliLiter(s) (100 mL/Hr) IV Continuous <Continuous>  dextrose 5%. 1000 milliLiter(s) (50 mL/Hr) IV Continuous <Continuous>  dextrose 50% Injectable 25 Gram(s) IV Push once  dextrose 50% Injectable 12.5 Gram(s) IV Push once  dextrose 50% Injectable 25 Gram(s) IV Push once  gabapentin 200 milliGRAM(s) Oral daily  glucagon  Injectable 1 milliGRAM(s) IntraMuscular once  heparin  Infusion.  Unit(s)/Hr (15 mL/Hr) IV Continuous <Continuous>  insulin glargine Injectable (LANTUS) 7 Unit(s) SubCutaneous at bedtime  insulin lispro (ADMELOG) corrective regimen sliding scale   SubCutaneous three times a day before meals  insulin lispro (ADMELOG) corrective regimen sliding scale   SubCutaneous at bedtime  insulin lispro Injectable (ADMELOG) 4 Unit(s) SubCutaneous three times a day before meals  metoprolol tartrate 100 milliGRAM(s) Oral every 12 hours  pantoprazole    Tablet 40 milliGRAM(s) Oral before breakfast  sodium bicarbonate 650 milliGRAM(s) Oral daily    MEDICATIONS  (PRN):  acetaminophen     Tablet .. 650 milliGRAM(s) Oral every 6 hours PRN Temp greater or equal to 38C (100.4F), Mild Pain (1 - 3)  aluminum hydroxide/magnesium hydroxide/simethicone Suspension 30 milliLiter(s) Oral every 4 hours PRN Dyspepsia  dextrose Oral Gel 15 Gram(s) Oral once PRN Blood Glucose LESS THAN 70 milliGRAM(s)/deciliter  heparin   Injectable 6500 Unit(s) IV Push every 6 hours PRN For aPTT less than 40  heparin   Injectable 3000 Unit(s) IV Push every 6 hours PRN For aPTT between 40 - 57  melatonin 3 milliGRAM(s) Oral at bedtime PRN Insomnia      CAPILLARY BLOOD GLUCOSE      POCT Blood Glucose.: 133 mg/dL (24 Apr 2023 22:47)  POCT Blood Glucose.: 94 mg/dL (24 Apr 2023 21:37)    I&O's Summary    24 Apr 2023 07:01  -  25 Apr 2023 07:00  --------------------------------------------------------  IN: 0 mL / OUT: 800 mL / NET: -800 mL        Vital Signs Last 24 Hrs  T(C): 36.4 (25 Apr 2023 04:29), Max: 37.3 (24 Apr 2023 21:57)  T(F): 97.5 (25 Apr 2023 04:29), Max: 99.1 (24 Apr 2023 21:57)  HR: 62 (25 Apr 2023 04:29) (62 - 84)  BP: 124/80 (25 Apr 2023 04:29) (117/66 - 161/66)  BP(mean): --  RR: 18 (25 Apr 2023 04:29) (17 - 20)  SpO2: 99% (25 Apr 2023 04:29) (93% - 100%)    Parameters below as of 25 Apr 2023 04:29  Patient On (Oxygen Delivery Method): room air        PHYSICAL EXAM:  GENERAL: Lying in bed in no acute distress, wearing 3L NC  EYES: Conjunctiva noninjected or pale, sclera anicteric  HENT: NC/AT, moist mucous membranes  NECK: Supple, trachea midline  LUNG: Nonlabored respirations, no wheezes, rales  CV: RRR, Pulses- Radial: 2+ b/l.   ABDOMEN: Nondistended, nontender  MSK: No visible deformities, nontender extremities.  SKIN: No rashes, bruises  NEURO: AAOx4 (to person, place, time, event), no tremor    LABS:                        9.6    8.52  )-----------( 387      ( 25 Apr 2023 04:37 )             32.6      04-24    139  |  103  |  42<H>  ----------------------------<  258<H>  4.7   |  25  |  1.74<H>    Ca    8.8      24 Apr 2023 12:07    TPro  7.3  /  Alb  3.4  /  TBili  0.2  /  DBili  x   /  AST  46<H>  /  ALT  37  /  AlkPhos  139<H>  04-24    PT/INR - ( 24 Apr 2023 12:07 )   PT: 12.8 sec;   INR: 1.11 ratio         PTT - ( 25 Apr 2023 04:28 )  PTT:186.2 sec          RADIOLOGY & ADDITIONAL TESTS:    Imaging Personally Reviewed:    Consultant(s) Notes Reviewed:      Care Discussed with Consultants/Other Providers:

## 2023-04-25 NOTE — CONSULT NOTE ADULT - SUBJECTIVE AND OBJECTIVE BOX
DATE OF SERVICE: 04-25-23 @ 23:15    CHIEF COMPLAINT:Patient is a 83y old  Female who presents with a chief complaint of Shortness of breath (25 Apr 2023 11:12)      HISTORY OF PRESENT ILLNESS:HPI:  Ms. Batista is a 84 YO woman with PMH of HTN, HLD, DM2, HFpEF (EF 65%), CAD s/p CABG, Breast CA s/p R partial mastectomy, rectal CA s/p resection with previous admission for acute rissa s/p ERCP w/ stent c/b gallbladder perf w/ perc rissa and recurrent SVT, and recent admission for CHF exacerbation presenting for shortness of breath since last night. Per her daughter, she gained between 8-12 pounds since discharge from [facility] rehab on 4/14. She denies worsening of shortness of breath when lying flat. She denies cough or sore throat. Her daughter notes she was complaining of nausea, dizziness and "fluid in her ears," no falls.  (24 Apr 2023 15:05)      PAST MEDICAL & SURGICAL HISTORY:  Breast CA, right  1989 s/p mastectomy ,IV Chemotherapy      HTN (hypertension)      Lymphedema of arm  right arm s/p mastectomy      Hyperlipidemia      Rectal cancer  s/p chemotherapy and  radiation 12 weeks 2/2015 -3/2015      Obese      Type II diabetes mellitus      CHF (congestive heart failure)      DVT of leg (deep venous thrombosis)  right calf DVT  2/2019 pt on Eliquis      Renal insufficiency      Gallstone      S/P mastectomy, right  1989      History of appendectomy  1953      S/P ileostomy  low anterior resection-8/10/15, reversal of ileostomy 2016      S/P colon resection  2015      S/P TKR (total knee replacement), right  2017      S/P hip replacement, right      Herniated lumbar intervertebral disc              MEDICATIONS:  aspirin  chewable 81 milliGRAM(s) Oral daily  buMETAnide 2 milliGRAM(s) Oral daily  heparin   Injectable 6500 Unit(s) IV Push every 6 hours PRN  heparin   Injectable 3000 Unit(s) IV Push every 6 hours PRN  heparin  Infusion.  Unit(s)/Hr IV Continuous <Continuous>  metoprolol tartrate 100 milliGRAM(s) Oral every 12 hours        acetaminophen     Tablet .. 650 milliGRAM(s) Oral every 6 hours PRN  gabapentin 200 milliGRAM(s) Oral daily  melatonin 3 milliGRAM(s) Oral at bedtime PRN    aluminum hydroxide/magnesium hydroxide/simethicone Suspension 30 milliLiter(s) Oral every 4 hours PRN  pantoprazole    Tablet 40 milliGRAM(s) Oral before breakfast  polyethylene glycol 3350 17 Gram(s) Oral daily  senna 2 Tablet(s) Oral at bedtime    atorvastatin 40 milliGRAM(s) Oral at bedtime  dextrose 50% Injectable 25 Gram(s) IV Push once  dextrose 50% Injectable 25 Gram(s) IV Push once  dextrose 50% Injectable 12.5 Gram(s) IV Push once  dextrose Oral Gel 15 Gram(s) Oral once PRN  glucagon  Injectable 1 milliGRAM(s) IntraMuscular once  insulin glargine Injectable (LANTUS) 7 Unit(s) SubCutaneous at bedtime  insulin lispro (ADMELOG) corrective regimen sliding scale   SubCutaneous three times a day before meals  insulin lispro (ADMELOG) corrective regimen sliding scale   SubCutaneous at bedtime  insulin lispro Injectable (ADMELOG) 4 Unit(s) SubCutaneous three times a day before meals    dextrose 5%. 1000 milliLiter(s) IV Continuous <Continuous>  dextrose 5%. 1000 milliLiter(s) IV Continuous <Continuous>  ferrous    sulfate 325 milliGRAM(s) Oral daily  sodium bicarbonate 650 milliGRAM(s) Oral daily      FAMILY HISTORY:  Family history of diabetes mellitus in mother    Family history of diabetes mellitus in son    Family history of heart attack (Child)        Non-contributory    SOCIAL HISTORY:    [ ] Tobacco  [ ] Drugs  [ ] Alcohol    Allergies    CT scan dye (Hives)  penicillin (Unknown)    Intolerances    	    REVIEW OF SYSTEMS:  CONSTITUTIONAL: No fever  EYES: No eye pain, visual disturbances, or discharge  ENMT:  No difficulty hearing, tinnitus  NECK: No pain or stiffness  RESPIRATORY: No cough, wheezing,  CARDIOVASCULAR: No chest pain, palpitations, passing out, dizziness, or leg swelling  GASTROINTESTINAL:  No nausea, vomiting, diarrhea or constipation. No melena.  GENITOURINARY: No dysuria, hematuria  NEUROLOGICAL: No stroke like symptoms  SKIN: No burning or lesions   ENDOCRINE: No heat or cold intolerance  MUSCULOSKELETAL: No joint pain or swelling  PSYCHIATRIC: No  anxiety, mood swings  HEME/LYMPH: No bleeding gums  ALLERGY AND IMMUNOLOGIC: No hives or eczema	    All other ROS negative    PHYSICAL EXAM:  T(C): 36.6 (04-25-23 @ 19:56), Max: 36.8 (04-25-23 @ 14:29)  HR: 72 (04-25-23 @ 22:48) (62 - 84)  BP: 114/64 (04-25-23 @ 19:56) (114/64 - 148/73)  RR: 18 (04-25-23 @ 19:56) (17 - 18)  SpO2: 99% (04-25-23 @ 22:48) (92% - 99%)  Wt(kg): --  I&O's Summary    24 Apr 2023 07:01  -  25 Apr 2023 07:00  --------------------------------------------------------  IN: 0 mL / OUT: 800 mL / NET: -800 mL    25 Apr 2023 07:01  -  25 Apr 2023 23:15  --------------------------------------------------------  IN: 0 mL / OUT: 1150 mL / NET: -1150 mL        Appearance: Normal	  HEENT:   Normal oral mucosa, EOMI	  Cardiovascular:  S1 S2, No JVD,    Respiratory: Lungs clear to auscultation	  Psychiatry: Alert  Gastrointestinal:  Soft, Non-tender, + BS	  Skin: No rashes   Neurologic: Non-focal  Extremities:  No edema  Vascular: Peripheral pulses palpable    	    	  	  CARDIAC MARKERS:  Labs personally reviewed by me                                  9.6    8.52  )-----------( 387      ( 25 Apr 2023 04:37 )             32.6     04-25    139  |  99  |  32<H>  ----------------------------<  201<H>  4.4   |  30  |  1.86<H>    Ca    9.2      25 Apr 2023 20:47  Phos  3.1     04-25  Mg     1.6     04-25    TPro  7.3  /  Alb  3.4  /  TBili  0.2  /  DBili  x   /  AST  46<H>  /  ALT  37  /  AlkPhos  139<H>  04-24          EKG: Personally reviewed by me -   Radiology: Personally reviewed by me -       Assessment /Plan:   Appears well compensated from HF standpoint    No contraindication for EGD, if planned    Full recs to follow              Differential diagnosis and plan of care discussed with patient after the evaluation. Counseling on diet, nutritional counseling, weight management, exercise and medication compliance was done.   Advanced care planning/advanced directives discussed with patient/family. DNR status including forceful chest compressions to attempt to restart the heart, ventilator support/artificial breathing, electric shock, artificial nutrition, health care proxy, Molst form all discussed with pt. Pt wishes to consider. More than fifteen minutes spent on discussing advanced directives.            Sergey Winchester DO [facility]  Cardiovascular Medicine  [address], [address]  Office [phone]  Available via call/text on [employer] Teams DATE OF SERVICE: 04-25-23 @ 23:11    CHIEF COMPLAINT:Patient is a 83y old  Female who presents with a chief complaint of Shortness of breath (25 Apr 2023 11:12)      HISTORY OF PRESENT ILLNESS:HPI:  Ms. Batista is a 82 YO woman with PMH of HTN, HLD, DM2, HFpEF (EF 65%), CAD s/p CABG, Breast CA s/p R partial mastectomy, rectal CA s/p resection with previous admission for acute rissa s/p ERCP w/ stent c/b gallbladder perf w/ perc rissa and recurrent SVT, and recent admission for CHF exacerbation presenting for shortness of breath since last night. Per her daughter, she gained between 8-12 pounds since discharge from [facility] rehab on 4/14. She denies worsening of shortness of breath when lying flat. She denies cough or sore throat. Her daughter notes she was complaining of nausea, dizziness and "fluid in her ears," no falls.  (24 Apr 2023 15:05)      PAST MEDICAL & SURGICAL HISTORY:  Breast CA, right  1989 s/p mastectomy ,IV Chemotherapy      HTN (hypertension)      Lymphedema of arm  right arm s/p mastectomy      Hyperlipidemia      Rectal cancer  s/p chemotherapy and  radiation 12 weeks 2/2015 -3/2015      Obese      Type II diabetes mellitus      CHF (congestive heart failure)      DVT of leg (deep venous thrombosis)  right calf DVT  2/2019 pt on Eliquis      Renal insufficiency      Gallstone      S/P mastectomy, right  1989      History of appendectomy  1953      S/P ileostomy  low anterior resection-8/10/15, reversal of ileostomy 2016      S/P colon resection  2015      S/P TKR (total knee replacement), right  2017      S/P hip replacement, right      Herniated lumbar intervertebral disc              MEDICATIONS:  aspirin  chewable 81 milliGRAM(s) Oral daily  buMETAnide 2 milliGRAM(s) Oral daily  heparin   Injectable 6500 Unit(s) IV Push every 6 hours PRN  heparin   Injectable 3000 Unit(s) IV Push every 6 hours PRN  heparin  Infusion.  Unit(s)/Hr IV Continuous <Continuous>  metoprolol tartrate 100 milliGRAM(s) Oral every 12 hours        acetaminophen     Tablet .. 650 milliGRAM(s) Oral every 6 hours PRN  gabapentin 200 milliGRAM(s) Oral daily  melatonin 3 milliGRAM(s) Oral at bedtime PRN    aluminum hydroxide/magnesium hydroxide/simethicone Suspension 30 milliLiter(s) Oral every 4 hours PRN  pantoprazole    Tablet 40 milliGRAM(s) Oral before breakfast  polyethylene glycol 3350 17 Gram(s) Oral daily  senna 2 Tablet(s) Oral at bedtime    atorvastatin 40 milliGRAM(s) Oral at bedtime  dextrose 50% Injectable 25 Gram(s) IV Push once  dextrose 50% Injectable 25 Gram(s) IV Push once  dextrose 50% Injectable 12.5 Gram(s) IV Push once  dextrose Oral Gel 15 Gram(s) Oral once PRN  glucagon  Injectable 1 milliGRAM(s) IntraMuscular once  insulin glargine Injectable (LANTUS) 7 Unit(s) SubCutaneous at bedtime  insulin lispro (ADMELOG) corrective regimen sliding scale   SubCutaneous at bedtime  insulin lispro (ADMELOG) corrective regimen sliding scale   SubCutaneous three times a day before meals  insulin lispro Injectable (ADMELOG) 4 Unit(s) SubCutaneous three times a day before meals    dextrose 5%. 1000 milliLiter(s) IV Continuous <Continuous>  dextrose 5%. 1000 milliLiter(s) IV Continuous <Continuous>  ferrous    sulfate 325 milliGRAM(s) Oral daily  sodium bicarbonate 650 milliGRAM(s) Oral daily      FAMILY HISTORY:  Family history of diabetes mellitus in mother    Family history of diabetes mellitus in son    Family history of heart attack (Child)        Non-contributory    SOCIAL HISTORY:    [ ] not a smoker  Allergies    CT scan dye (Hives)  penicillin (Unknown)    Intolerances    	    REVIEW OF SYSTEMS:  CONSTITUTIONAL: No fever  EYES: No eye pain, visual disturbances, or discharge  ENMT:  No difficulty hearing, tinnitus  NECK: No pain or stiffness  RESPIRATORY: No cough, wheezing, +SOB  CARDIOVASCULAR: No chest pain, palpitations, passing out, dizziness, or leg swelling  GASTROINTESTINAL:  No nausea, vomiting, diarrhea or constipation. No melena.  GENITOURINARY: No dysuria, hematuria  NEUROLOGICAL: No stroke like symptoms  SKIN: No burning or lesions   ENDOCRINE: No heat or cold intolerance  MUSCULOSKELETAL: No joint pain or swelling  PSYCHIATRIC: No  anxiety, mood swings  HEME/LYMPH: No bleeding gums  ALLERGY AND IMMUNOLOGIC: No hives or eczema	    All other ROS negative    PHYSICAL EXAM:  T(C): 36.6 (04-25-23 @ 19:56), Max: 36.8 (04-25-23 @ 14:29)  HR: 72 (04-25-23 @ 22:48) (62 - 84)  BP: 114/64 (04-25-23 @ 19:56) (114/64 - 148/73)  RR: 18 (04-25-23 @ 19:56) (17 - 18)  SpO2: 99% (04-25-23 @ 22:48) (92% - 99%)  Wt(kg): --  I&O's Summary    24 Apr 2023 07:01  -  25 Apr 2023 07:00  --------------------------------------------------------  IN: 0 mL / OUT: 800 mL / NET: -800 mL    25 Apr 2023 07:01  -  25 Apr 2023 23:11  --------------------------------------------------------  IN: 0 mL / OUT: 1150 mL / NET: -1150 mL        Appearance: Normal	  HEENT:   Normal oral mucosa, EOMI	  Cardiovascular:  S1 S2, No JVD,    Respiratory: Lungs clear to auscultation	  Psychiatry: Alert  Gastrointestinal:  Soft, Non-tender, + BS	  Skin: No rashes   Neurologic: Non-focal  Extremities:  No edema  Vascular: Peripheral pulses palpable    	    	  	  CARDIAC MARKERS:  Labs personally reviewed by me                                  9.6    8.52  )-----------( 387      ( 25 Apr 2023 04:37 )             32.6     04-25    139  |  99  |  32<H>  ----------------------------<  201<H>  4.4   |  30  |  1.86<H>    Ca    9.2      25 Apr 2023 20:47  Phos  3.1     04-25  Mg     1.6     04-25    TPro  7.3  /  Alb  3.4  /  TBili  0.2  /  DBili  x   /  AST  46<H>  /  ALT  37  /  AlkPhos  139<H>  04-24          EKG: Personally reviewed by me -   Radiology: Personally reviewed by me - CXR clear lungs          from: Xray Chest 1 View AP/PA (04.24.23 @ 12:13) >  FINDINGS:  Median sternotomy. Right axillary clips.  The heart is normal in size.  No focal consolidation.  There is no pneumothorax or pleural effusion.    IMPRESSION:  No focal consolidation.    < end of copied text >  < from: CT Chest No Cont (03.24.23 @ 16:15) >  IMPRESSION:  Pulmonary edema with moderate pleural effusions, increased since 2/3/2023   abdominal CT. No pneumonia.    < end of copied text >  < from: TTE with Doppler (w/Cont) (03.23.23 @ 09:48) >  Conclusions:  1. Mild mitral annular calcification. Tethered mitral valve  leaflets with normal opening. Minimal mitral regurgitation.  2. Calcified trileaflet aortic valve with normal opening.  No aortic valve regurgitation seen.  3. Endocardial visualization enhanced with intravenous  injection of Ultrasonic Enhancing Agent (Definity). Overall  hyperdynamic left ventricular systolic function. The basal  inferoseptum is hypokinetic.  4. The right ventricle is not well visualized; grossly,  right ventricle appears mildly enlarged with mildly  decreased right ventricular systolic function.  5. Estimated right ventricular systolic pressure equals 62  mm Hg, assuming right atrial pressure equals 3 mm Hg,  consistent with severe pulmonary hypertension.  6. Bilateral pleural effusions.  *** Compared with echocardiogram of 1/30/2023, results are  similar on today's study.    < end of copied text >  < from: Cardiac Cath Lab - Adult (11.05.20 @ 15:13) >  CORONARY VESSELS: The coronary circulation is right dominant.  LM:   --  LM: Angiography showed moderate atherosclerosis.  LAD:   --  Proximal LAD: There was a diffuse 60 % stenosis.  CX:   --  Ostial circumflex: There was a 90 % stenosis.  RCA:   --  Ostial RCA: There was a 90 % stenosis.  COMPLICATIONS: There were no complications.  DIAGNOSTIC RECOMMENDATIONS: Consultation with a cardiac surgeon will be  obtained for coronary artery bypass grafting.    < end of copied text >  Bypass graft, coronary artery, 1 arterial and 2 venous 09-Nov-2020 19:34:33 CABG X 2 (LIMA->LAD, SVG->RCA) Nadja Lacey.    ASSESSMENT/PLAN: 	    Ms. Batista is a 82 YO woman with PMH of HTN, HLD, DM2, HFpEF (EF 65%), CAD s/p CABG, Breast CA s/p R partial mastectomy, rectal CA s/p resection with recent admission for acute rissa s/p ERCP w/ stent c/b gallbladder perf w/ perc rissa and recurrent SVT, presenting for shortness of breath. Denies CP, palpitations, orthopnea or syncope. Follows as OP with Dr. Hagan.    Problem/Plan -1  Problem: Acute on Chronic Diastolic CHF  - POCUS suggestive of pulm edema  - CXR with no pulm edema or effusion. Prior CT one month ago with moderate effusions and pulm edema  - proBNP 2505  - Prior TTE 3/23 shows preserved EF, hyperdynamic LV function, basal inferoseptum hypokinesis, decreased RV systolic function severe pulm pressures, B/L pleural effusions  - c/w Bumex 2mg PO daily; Will give an additional dose of Bumex 2m PO this evening. Change to 2mg PO BID  - Strict I&Os, daily weights  - Wean supplemental oxygen as tolerated    Problem/Plan -2  Problem: CAD   - s/p CABG 2020  - ECG with no ischemia noted  - Trop 21  - c/w ASA, metoprolol and statin  - Formal TTE pending    Problem/Plan -3  Problem: hx of Recurrent SVT  - c/w Metoprolol 100mg PO BID  - cont to monitor on tele    Problem/Plan -4  Problem: Atrial Fibrillation  - Patient with n/o A-flutter on previous admission  - c/w Metoprolol   - eliquis 2.5mg PO BID on hold for planned ERCP  - c/w Heparin gtt            Differential diagnosis and plan of care discussed with patient after the evaluation. Counseling on diet, nutritional counseling, weight management, exercise and medication compliance was done.   Advanced care planning/advanced directives discussed with patient/family. DNR status including forceful chest compressions to attempt to restart the heart, ventilator support/artificial breathing, electric shock, artificial nutrition, health care proxy, Molst form all discussed with pt. Pt wishes to consider. More than fifteen minutes spent on discussing advanced directives.         Sergey Winchester DO [facility]  Cardiovascular Medicine  [address], [address]  Office [phone]  Available via call/text on [employer] Teams DATE OF SERVICE: 04-25-23 @ 23:11    CHIEF COMPLAINT:Patient is a 83y old  Female who presents with a chief complaint of Shortness of breath (25 Apr 2023 11:12)      HISTORY OF PRESENT ILLNESS:HPI:  Ms. Batista is a 82 YO woman with PMH of HTN, HLD, DM2, HFpEF (EF 65%), CAD s/p CABG, Breast CA s/p R partial mastectomy, rectal CA s/p resection with previous admission for acute rissa s/p ERCP w/ stent c/b gallbladder perf w/ perc rissa and recurrent SVT, and recent admission for CHF exacerbation presenting for shortness of breath since last night. Per her daughter, she gained between 8-12 pounds since discharge from [facility] rehab on 4/14. She denies worsening of shortness of breath when lying flat. She denies cough or sore throat. Her daughter notes she was complaining of nausea, dizziness and "fluid in her ears," no falls.  (24 Apr 2023 15:05)      PAST MEDICAL & SURGICAL HISTORY:  Breast CA, right  1989 s/p mastectomy ,IV Chemotherapy      HTN (hypertension)      Lymphedema of arm  right arm s/p mastectomy      Hyperlipidemia      Rectal cancer  s/p chemotherapy and  radiation 12 weeks 2/2015 -3/2015      Obese      Type II diabetes mellitus      CHF (congestive heart failure)      DVT of leg (deep venous thrombosis)  right calf DVT  2/2019 pt on Eliquis      Renal insufficiency      Gallstone      S/P mastectomy, right  1989      History of appendectomy  1953      S/P ileostomy  low anterior resection-8/10/15, reversal of ileostomy 2016      S/P colon resection  2015      S/P TKR (total knee replacement), right  2017      S/P hip replacement, right      Herniated lumbar intervertebral disc              MEDICATIONS:  aspirin  chewable 81 milliGRAM(s) Oral daily  buMETAnide 2 milliGRAM(s) Oral daily  heparin   Injectable 6500 Unit(s) IV Push every 6 hours PRN  heparin   Injectable 3000 Unit(s) IV Push every 6 hours PRN  heparin  Infusion.  Unit(s)/Hr IV Continuous <Continuous>  metoprolol tartrate 100 milliGRAM(s) Oral every 12 hours        acetaminophen     Tablet .. 650 milliGRAM(s) Oral every 6 hours PRN  gabapentin 200 milliGRAM(s) Oral daily  melatonin 3 milliGRAM(s) Oral at bedtime PRN    aluminum hydroxide/magnesium hydroxide/simethicone Suspension 30 milliLiter(s) Oral every 4 hours PRN  pantoprazole    Tablet 40 milliGRAM(s) Oral before breakfast  polyethylene glycol 3350 17 Gram(s) Oral daily  senna 2 Tablet(s) Oral at bedtime    atorvastatin 40 milliGRAM(s) Oral at bedtime  dextrose 50% Injectable 25 Gram(s) IV Push once  dextrose 50% Injectable 25 Gram(s) IV Push once  dextrose 50% Injectable 12.5 Gram(s) IV Push once  dextrose Oral Gel 15 Gram(s) Oral once PRN  glucagon  Injectable 1 milliGRAM(s) IntraMuscular once  insulin glargine Injectable (LANTUS) 7 Unit(s) SubCutaneous at bedtime  insulin lispro (ADMELOG) corrective regimen sliding scale   SubCutaneous at bedtime  insulin lispro (ADMELOG) corrective regimen sliding scale   SubCutaneous three times a day before meals  insulin lispro Injectable (ADMELOG) 4 Unit(s) SubCutaneous three times a day before meals    dextrose 5%. 1000 milliLiter(s) IV Continuous <Continuous>  dextrose 5%. 1000 milliLiter(s) IV Continuous <Continuous>  ferrous    sulfate 325 milliGRAM(s) Oral daily  sodium bicarbonate 650 milliGRAM(s) Oral daily      FAMILY HISTORY:  Family history of diabetes mellitus in mother    Family history of diabetes mellitus in son    Family history of heart attack (Child)        Non-contributory    SOCIAL HISTORY:    [ ] not a smoker  Allergies    CT scan dye (Hives)  penicillin (Unknown)    Intolerances    	    REVIEW OF SYSTEMS:  CONSTITUTIONAL: No fever  EYES: No eye pain, visual disturbances, or discharge  ENMT:  No difficulty hearing, tinnitus  NECK: No pain or stiffness  RESPIRATORY: No cough, wheezing, +SOB  CARDIOVASCULAR: No chest pain, palpitations, passing out, dizziness, or leg swelling  GASTROINTESTINAL:  No nausea, vomiting, diarrhea or constipation. No melena.  GENITOURINARY: No dysuria, hematuria  NEUROLOGICAL: No stroke like symptoms  SKIN: No burning or lesions   ENDOCRINE: No heat or cold intolerance  MUSCULOSKELETAL: No joint pain or swelling  PSYCHIATRIC: No  anxiety, mood swings  HEME/LYMPH: No bleeding gums  ALLERGY AND IMMUNOLOGIC: No hives or eczema	    All other ROS negative    PHYSICAL EXAM:  T(C): 36.6 (04-25-23 @ 19:56), Max: 36.8 (04-25-23 @ 14:29)  HR: 72 (04-25-23 @ 22:48) (62 - 84)  BP: 114/64 (04-25-23 @ 19:56) (114/64 - 148/73)  RR: 18 (04-25-23 @ 19:56) (17 - 18)  SpO2: 99% (04-25-23 @ 22:48) (92% - 99%)  Wt(kg): --  I&O's Summary    24 Apr 2023 07:01  -  25 Apr 2023 07:00  --------------------------------------------------------  IN: 0 mL / OUT: 800 mL / NET: -800 mL    25 Apr 2023 07:01  -  25 Apr 2023 23:11  --------------------------------------------------------  IN: 0 mL / OUT: 1150 mL / NET: -1150 mL        Appearance: Normal	  HEENT:   Normal oral mucosa, EOMI	  Cardiovascular:  S1 S2, No JVD,    Respiratory: Lungs clear to auscultation	  Psychiatry: Alert  Gastrointestinal:  Soft, Non-tender, + BS	  Skin: No rashes   Neurologic: Non-focal  Extremities:  No edema  Vascular: Peripheral pulses palpable    	    	  	  CARDIAC MARKERS:  Labs personally reviewed by me                                  9.6    8.52  )-----------( 387      ( 25 Apr 2023 04:37 )             32.6     04-25    139  |  99  |  32<H>  ----------------------------<  201<H>  4.4   |  30  |  1.86<H>    Ca    9.2      25 Apr 2023 20:47  Phos  3.1     04-25  Mg     1.6     04-25    TPro  7.3  /  Alb  3.4  /  TBili  0.2  /  DBili  x   /  AST  46<H>  /  ALT  37  /  AlkPhos  139<H>  04-24          EKG: Personally reviewed by me -   Radiology: Personally reviewed by me - CXR clear lungs          from: Xray Chest 1 View AP/PA (04.24.23 @ 12:13) >  FINDINGS:  Median sternotomy. Right axillary clips.  The heart is normal in size.  No focal consolidation.  There is no pneumothorax or pleural effusion.    IMPRESSION:  No focal consolidation.    < end of copied text >  < from: CT Chest No Cont (03.24.23 @ 16:15) >  IMPRESSION:  Pulmonary edema with moderate pleural effusions, increased since 2/3/2023   abdominal CT. No pneumonia.    < end of copied text >  < from: TTE with Doppler (w/Cont) (03.23.23 @ 09:48) >  Conclusions:  1. Mild mitral annular calcification. Tethered mitral valve  leaflets with normal opening. Minimal mitral regurgitation.  2. Calcified trileaflet aortic valve with normal opening.  No aortic valve regurgitation seen.  3. Endocardial visualization enhanced with intravenous  injection of Ultrasonic Enhancing Agent (Definity). Overall  hyperdynamic left ventricular systolic function. The basal  inferoseptum is hypokinetic.  4. The right ventricle is not well visualized; grossly,  right ventricle appears mildly enlarged with mildly  decreased right ventricular systolic function.  5. Estimated right ventricular systolic pressure equals 62  mm Hg, assuming right atrial pressure equals 3 mm Hg,  consistent with severe pulmonary hypertension.  6. Bilateral pleural effusions.  *** Compared with echocardiogram of 1/30/2023, results are  similar on today's study.    < end of copied text >  < from: Cardiac Cath Lab - Adult (11.05.20 @ 15:13) >  CORONARY VESSELS: The coronary circulation is right dominant.  LM:   --  LM: Angiography showed moderate atherosclerosis.  LAD:   --  Proximal LAD: There was a diffuse 60 % stenosis.  CX:   --  Ostial circumflex: There was a 90 % stenosis.  RCA:   --  Ostial RCA: There was a 90 % stenosis.  COMPLICATIONS: There were no complications.  DIAGNOSTIC RECOMMENDATIONS: Consultation with a cardiac surgeon will be  obtained for coronary artery bypass grafting.    < end of copied text >  Bypass graft, coronary artery, 1 arterial and 2 venous 09-Nov-2020 19:34:33 CABG X 2 (LIMA->LAD, SVG->RCA) Nadja Lacey.    ASSESSMENT/PLAN: 	    Ms. Batista is a 82 YO woman with PMH of HTN, HLD, DM2, HFpEF (EF 65%), CAD s/p CABG, Breast CA s/p R partial mastectomy, rectal CA s/p resection with recent admission for acute rissa s/p ERCP w/ stent c/b gallbladder perf w/ perc rissa and recurrent SVT, presenting for shortness of breath. Denies CP, palpitations, orthopnea or syncope. Follows as OP with Dr. Hagan.    Problem/Plan -1  Problem: Acute on Chronic Diastolic CHF  - POCUS suggestive of pulm edema  - CXR with no pulm edema or effusion. Prior CT one month ago with moderate effusions and pulm edema  - proBNP 2505  - Prior TTE 3/23 shows preserved EF, hyperdynamic LV function, basal inferoseptum hypokinesis, decreased RV systolic function severe pulm pressures, B/L pleural effusions  - c/w Bumex 2mg PO daily;  - Strict I&Os, daily weights  - Wean supplemental oxygen as tolerated    Problem/Plan -2  Problem: CAD   - s/p CABG 2020  - ECG with no ischemia noted  - Trop 21  - c/w ASA, metoprolol and statin  - Formal TTE pending    Problem/Plan -3  Problem: hx of Recurrent SVT  - c/w Metoprolol 100mg PO BID  - cont to monitor on tele    Problem/Plan -4  Problem: Atrial Fibrillation  - Patient with n/o A-flutter on previous admission  - c/w Metoprolol   - eliquis 2.5mg PO BID on hold for planned ERCP  - c/w Heparin gtt            Differential diagnosis and plan of care discussed with patient after the evaluation. Counseling on diet, nutritional counseling, weight management, exercise and medication compliance was done.   Advanced care planning/advanced directives discussed with patient/family. DNR status including forceful chest compressions to attempt to restart the heart, ventilator support/artificial breathing, electric shock, artificial nutrition, health care proxy, Molst form all discussed with pt. Pt wishes to consider. More than fifteen minutes spent on discussing advanced directives.         Sergey Winchester DO [facility]  Cardiovascular Medicine  [address] [place] [address], [address]  Office [phone]  Available via call/text on Microsoft Teams

## 2023-04-25 NOTE — PROGRESS NOTE ADULT - ATTENDING COMMENTS
84 YO woman with PMH of HTN, HLD, DM2, HFpEF (EF 65%), CAD s/p CABG, Breast CA s/p R partial mastectomy, rectal CA s/p resection with recent admission for acute rissa s/p ERCP w/ stent c/b gallbladder perf w/ perc rissa and recurrent SVT, presenting for shortness of breath,     #Acute on chronic CHF exacerbation  #Acute hypoxic respiratory failure   #H/o cholecystitis   #CKD  #DM  #Chronic Afib  #HTN  #Anemia    - presented due to SOB; s/p bumex 2mg IV in ED and placed on BiPAP; appears to be improving overall; no longer on BiPAP   - continue with bumex 2mg daily for now (patient is on 1mg at home)  - strict I/O; daily weight; cardio consult   - GI consult for possible ERCP in patient; patient scheduled as outpatient 4/26  - hold eliquis for now for possible procedure; heparin drip for now  - anemia panel noted; appears iron deficient; iron supplement added on   - bowel regimen   - PT eval pending     Rest as above. Discussed with HS.

## 2023-04-25 NOTE — PHYSICAL THERAPY INITIAL EVALUATION ADULT - ADDITIONAL COMMENTS
Pt lives with her daughter in a private home, 4 steps to enter, no steps inside. Pt was independent with all functional mobility and ADLs prior to admission using a RW. Pt lives with her daughter in a private home, 4 steps to enter, no steps inside. Pt was independent with all functional mobility and ADLs prior to admission using a RW. Addendum- 5/12- Pt lives alone in private house, 5 stairs to enter, also has a ramp to enter, pt uses both. Daughter lives nearby.

## 2023-04-25 NOTE — PROGRESS NOTE ADULT - ASSESSMENT
Ms. Batista is a 84 YO woman with PMH of HTN, HLD, DM2, HFpEF (EF 65%), CAD s/p CABG, Breast CA s/p R partial mastectomy, rectal CA s/p resection with recent admission for acute rissa s/p ERCP w/ stent c/b gallbladder perf w/ perc rissa and recurrent SVT, presenting for shortness of breath, POCUS of lungs c/w pulmonary edema, proBNP 2505.

## 2023-04-25 NOTE — PROGRESS NOTE ADULT - PROBLEM SELECTOR PLAN 1
ProBNP 2505, TTE 3/23 with EF 76%, severe pulmonary hypertension, moderate diastolic dysfunction. POCUS done in ED cw pulmonary edema  - Diuresis with Bumex 2 mg qD  - Strict I&Os  - Daily weights

## 2023-04-25 NOTE — PHYSICAL THERAPY INITIAL EVALUATION ADULT - PLANNED THERAPY INTERVENTIONS, PT EVAL
GOAL: Pt will negotiate up/down 4 steps with handrail ascending independently in 2 weeks/balance training/gait training/strengthening/transfer training

## 2023-04-26 ENCOUNTER — TRANSCRIPTION ENCOUNTER (OUTPATIENT)
Age: 83
End: 2023-04-26

## 2023-04-26 ENCOUNTER — APPOINTMENT (OUTPATIENT)
Dept: GASTROENTEROLOGY | Facility: HOSPITAL | Age: 83
End: 2023-04-26

## 2023-04-26 LAB
ANION GAP SERPL CALC-SCNC: 15 MMOL/L — SIGNIFICANT CHANGE UP (ref 5–17)
APTT BLD: 43.3 SEC — HIGH (ref 27.5–35.5)
APTT BLD: 52.1 SEC — HIGH (ref 27.5–35.5)
APTT BLD: 90.7 SEC — HIGH (ref 27.5–35.5)
BUN SERPL-MCNC: 30 MG/DL — HIGH (ref 7–23)
CALCIUM SERPL-MCNC: 9.1 MG/DL — SIGNIFICANT CHANGE UP (ref 8.4–10.5)
CHLORIDE SERPL-SCNC: 98 MMOL/L — SIGNIFICANT CHANGE UP (ref 96–108)
CO2 SERPL-SCNC: 24 MMOL/L — SIGNIFICANT CHANGE UP (ref 22–31)
CREAT SERPL-MCNC: 1.68 MG/DL — HIGH (ref 0.5–1.3)
EGFR: 30 ML/MIN/1.73M2 — LOW
GLUCOSE BLDC GLUCOMTR-MCNC: 155 MG/DL — HIGH (ref 70–99)
GLUCOSE BLDC GLUCOMTR-MCNC: 170 MG/DL — HIGH (ref 70–99)
GLUCOSE BLDC GLUCOMTR-MCNC: 187 MG/DL — HIGH (ref 70–99)
GLUCOSE BLDC GLUCOMTR-MCNC: 228 MG/DL — HIGH (ref 70–99)
GLUCOSE SERPL-MCNC: 183 MG/DL — HIGH (ref 70–99)
HCT VFR BLD CALC: 34.3 % — LOW (ref 34.5–45)
HGB BLD-MCNC: 9.9 G/DL — LOW (ref 11.5–15.5)
MAGNESIUM SERPL-MCNC: 1.6 MG/DL — SIGNIFICANT CHANGE UP (ref 1.6–2.6)
MCHC RBC-ENTMCNC: 26.6 PG — LOW (ref 27–34)
MCHC RBC-ENTMCNC: 28.9 GM/DL — LOW (ref 32–36)
MCV RBC AUTO: 92.2 FL — SIGNIFICANT CHANGE UP (ref 80–100)
NRBC # BLD: 0 /100 WBCS — SIGNIFICANT CHANGE UP (ref 0–0)
PHOSPHATE SERPL-MCNC: 3 MG/DL — SIGNIFICANT CHANGE UP (ref 2.5–4.5)
PLATELET # BLD AUTO: 418 K/UL — HIGH (ref 150–400)
POTASSIUM SERPL-MCNC: 3.9 MMOL/L — SIGNIFICANT CHANGE UP (ref 3.5–5.3)
POTASSIUM SERPL-SCNC: 3.9 MMOL/L — SIGNIFICANT CHANGE UP (ref 3.5–5.3)
RBC # BLD: 3.72 M/UL — LOW (ref 3.8–5.2)
RBC # FLD: 15.4 % — HIGH (ref 10.3–14.5)
SODIUM SERPL-SCNC: 137 MMOL/L — SIGNIFICANT CHANGE UP (ref 135–145)
WBC # BLD: 8.99 K/UL — SIGNIFICANT CHANGE UP (ref 3.8–10.5)
WBC # FLD AUTO: 8.99 K/UL — SIGNIFICANT CHANGE UP (ref 3.8–10.5)

## 2023-04-26 PROCEDURE — 99497 ADVNCD CARE PLAN 30 MIN: CPT | Mod: GC

## 2023-04-26 PROCEDURE — 99233 SBSQ HOSP IP/OBS HIGH 50: CPT | Mod: GC

## 2023-04-26 PROCEDURE — 99222 1ST HOSP IP/OBS MODERATE 55: CPT | Mod: GC

## 2023-04-26 RX ORDER — MAGNESIUM SULFATE 500 MG/ML
1 VIAL (ML) INJECTION ONCE
Refills: 0 | Status: COMPLETED | OUTPATIENT
Start: 2023-04-26 | End: 2023-04-26

## 2023-04-26 RX ORDER — BUMETANIDE 0.25 MG/ML
2 INJECTION INTRAMUSCULAR; INTRAVENOUS ONCE
Refills: 0 | Status: COMPLETED | OUTPATIENT
Start: 2023-04-26 | End: 2023-04-26

## 2023-04-26 RX ORDER — CHLORHEXIDINE GLUCONATE 213 G/1000ML
1 SOLUTION TOPICAL
Refills: 0 | Status: DISCONTINUED | OUTPATIENT
Start: 2023-04-26 | End: 2023-05-17

## 2023-04-26 RX ORDER — POTASSIUM CHLORIDE 20 MEQ
40 PACKET (EA) ORAL ONCE
Refills: 0 | Status: COMPLETED | OUTPATIENT
Start: 2023-04-26 | End: 2023-04-26

## 2023-04-26 RX ORDER — IRON SUCROSE 20 MG/ML
200 INJECTION, SOLUTION INTRAVENOUS EVERY 24 HOURS
Refills: 0 | Status: COMPLETED | OUTPATIENT
Start: 2023-04-26 | End: 2023-04-30

## 2023-04-26 RX ADMIN — Medication 100 MILLIGRAM(S): at 05:07

## 2023-04-26 RX ADMIN — HEPARIN SODIUM 1400 UNIT(S)/HR: 5000 INJECTION INTRAVENOUS; SUBCUTANEOUS at 07:23

## 2023-04-26 RX ADMIN — Medication 4 UNIT(S): at 17:38

## 2023-04-26 RX ADMIN — HEPARIN SODIUM 1600 UNIT(S)/HR: 5000 INJECTION INTRAVENOUS; SUBCUTANEOUS at 14:02

## 2023-04-26 RX ADMIN — BUMETANIDE 2 MILLIGRAM(S): 0.25 INJECTION INTRAMUSCULAR; INTRAVENOUS at 17:41

## 2023-04-26 RX ADMIN — Medication 4 UNIT(S): at 12:07

## 2023-04-26 RX ADMIN — CHLORHEXIDINE GLUCONATE 1 APPLICATION(S): 213 SOLUTION TOPICAL at 14:18

## 2023-04-26 RX ADMIN — BUMETANIDE 2 MILLIGRAM(S): 0.25 INJECTION INTRAMUSCULAR; INTRAVENOUS at 05:07

## 2023-04-26 RX ADMIN — Medication 1: at 12:07

## 2023-04-26 RX ADMIN — SENNA PLUS 2 TABLET(S): 8.6 TABLET ORAL at 21:53

## 2023-04-26 RX ADMIN — Medication 650 MILLIGRAM(S): at 11:15

## 2023-04-26 RX ADMIN — IRON SUCROSE 110 MILLIGRAM(S): 20 INJECTION, SOLUTION INTRAVENOUS at 12:43

## 2023-04-26 RX ADMIN — PANTOPRAZOLE SODIUM 40 MILLIGRAM(S): 20 TABLET, DELAYED RELEASE ORAL at 05:07

## 2023-04-26 RX ADMIN — ATORVASTATIN CALCIUM 40 MILLIGRAM(S): 80 TABLET, FILM COATED ORAL at 21:53

## 2023-04-26 RX ADMIN — GABAPENTIN 200 MILLIGRAM(S): 400 CAPSULE ORAL at 11:13

## 2023-04-26 RX ADMIN — Medication 100 GRAM(S): at 16:57

## 2023-04-26 RX ADMIN — Medication 81 MILLIGRAM(S): at 11:12

## 2023-04-26 RX ADMIN — Medication 1: at 07:48

## 2023-04-26 RX ADMIN — INSULIN GLARGINE 7 UNIT(S): 100 INJECTION, SOLUTION SUBCUTANEOUS at 21:53

## 2023-04-26 RX ADMIN — Medication 325 MILLIGRAM(S): at 11:13

## 2023-04-26 RX ADMIN — Medication 40 MILLIEQUIVALENT(S): at 16:58

## 2023-04-26 RX ADMIN — HEPARIN SODIUM 1600 UNIT(S)/HR: 5000 INJECTION INTRAVENOUS; SUBCUTANEOUS at 20:52

## 2023-04-26 RX ADMIN — Medication 100 MILLIGRAM(S): at 17:41

## 2023-04-26 RX ADMIN — HEPARIN SODIUM 1600 UNIT(S)/HR: 5000 INJECTION INTRAVENOUS; SUBCUTANEOUS at 19:14

## 2023-04-26 RX ADMIN — Medication 1: at 17:39

## 2023-04-26 NOTE — DISCHARGE NOTE PROVIDER - NSFOLLOWUPCLINICS_GEN_ALL_ED_FT
[facility]  Pulmonology  [address] [place] [address], [place] [address]  Phone: [phone]  Fax:   Follow Up Time: 2 weeks

## 2023-04-26 NOTE — DISCHARGE NOTE PROVIDER - NSDCCPCAREPLAN_GEN_ALL_CORE_FT
PRINCIPAL DISCHARGE DIAGNOSIS  Diagnosis: CHF exacerbation  Assessment and Plan of Treatment: You came into the hospital because you were short of breath. We evaluated you with ultrasound which showed that you were holding onto fluid, which had backed up into your lungs. We gave you a diuretic (Bumex) which is a medication that makes you urinate extra fluid. Your breathing improved and you were able to come off of oxygen supplementation. You should follow up with your cardiologist.      SECONDARY DISCHARGE DIAGNOSES  Diagnosis: H/O cholecystitis  Assessment and Plan of Treatment: You were originally scheduled to get an ERCP on 4/26 as an outpatient. We contacted the GI doctors to see if you could get it done during this hospitalization.     PRINCIPAL DISCHARGE DIAGNOSIS  Diagnosis: CHF exacerbation  Assessment and Plan of Treatment: You came into the hospital because you were short of breath. We evaluated you with ultrasound which showed that you were holding onto fluid, which had backed up into your lungs. We gave you a diuretic (Bumex) which is a medication that makes you urinate extra fluid. Your breathing improved but you were still requiring some oxygen so we had the lung doctors also evaluate you. You had another procedure (a catheterization) that showed high pressures in your pulmonary arteries, which can be contributing to your new need for oxygen. You should follow up with your cardiologist and also follow up with pulmonology for further testing at their discretion. Since your kidney function was a little worse than your normal later in your stay we had to stop the Bumex for a day and give you some fluids and your kidney function improved. You can resume your home Bumex dose on 5/18 and follow-up with nephrology next week. Another medication (spironolactone) was started for your heart failure.      SECONDARY DISCHARGE DIAGNOSES  Diagnosis: H/O cholecystitis  Assessment and Plan of Treatment: You were originally scheduled to get an ERCP on 4/26 as an outpatient. We contacted the GI doctors to see if you could get it done during this hospitalization. They recommended that you get this procedure outpatient, likely around two weeks from discharge. They should be in contact with you about scheduling.     PRINCIPAL DISCHARGE DIAGNOSIS  Diagnosis: CHF exacerbation  Assessment and Plan of Treatment: You came into the hospital because you were short of breath. We evaluated you with ultrasound which showed that you were holding onto fluid, which had backed up into your lungs. We gave you a diuretic (Bumex) which is a medication that makes you urinate extra fluid. Your breathing improved but you were still requiring some oxygen so we had the lung doctors also evaluate you. You had another procedure (a catheterization) that showed high pressures in your pulmonary arteries, which can be contributing to your new need for oxygen. You should follow up with your cardiologist and also follow up with pulmonology for further testing at their discretion. Since your kidney function was a little worse than your normal later in your stay we had to stop the Bumex for a day and give you some fluids and your kidney function improved. You can resume your home Bumex dose on 5/18 and follow-up with nephrology next week. Another medication (spironolactone) was started for your heart failure.  For your low oxygen levels, we are discharging you with home oxygen. Please use it when you are feeling short of breath or when your oxygen levels drop below 90%. You can take the oxygen off when levels rise above 94%.      SECONDARY DISCHARGE DIAGNOSES  Diagnosis: H/O cholecystitis  Assessment and Plan of Treatment: You were originally scheduled to get an ERCP on 4/26 as an outpatient. We contacted the GI doctors to see if you could get it done during this hospitalization. They recommended that you get this procedure outpatient, likely around two weeks from discharge. They should be in contact with you about scheduling.

## 2023-04-26 NOTE — DISCHARGE NOTE PROVIDER - NSDCFUADDAPPT_GEN_ALL_CORE_FT
Please follow-up with your PCP and cardiology within two weeks.  Please follow-up with your nephrology within one week.  Please follow-up with pulmonology within two weeks.  Please follow-up with gastroenterology within two weeks. They should be in contact with your to schedule your ERCP.    Please follow-up with your PCP (appt scheduled for 5/23) and cardiology within two weeks.  Please follow-up with your nephrology within one week.  Please follow-up with pulmonology within two weeks.  Please follow-up with gastroenterology within two weeks. They should be in contact with your to schedule your ERCP.

## 2023-04-26 NOTE — DISCHARGE NOTE PROVIDER - CARE PROVIDER_API CALL
Ld Hagan)  Cardiovascular Disease; Nuclear Cardiology  [address]  [place] [address]  Phone: [phone]  Fax: [phone]  Follow Up Time: 2 weeks    Katie Espinoza ()  Internal Medicine  [facility], [address] [place] [address]  Phone: [phone]  Fax: [phone]  Established Patient  Follow Up Time: 2 weeks   Ld Hagan (MD)  Cardiovascular Disease; Nuclear Cardiology  [address]  [place] [address]  Phone: [phone]  Fax: [phone]  Follow Up Time: 2 weeks    Katie Espinoza (DO)  Internal Medicine  [facility], [address] [place] [address]  Phone: [phone]  Fax: [phone]  Established Patient  Follow Up Time: 2 weeks    Joseph Blanchard)  Gastroenterology; Internal Medicine  Division of Gastroenterology - [address] [place] [address]  Phone: [phone]  Fax: [phone]  Established Patient  Follow Up Time: 2 weeks    Brenda Rader (DO)  Medicine  [address] [place] [id number]  Phone: [phone]  Established Patient  Follow Up Time: 1 week

## 2023-04-26 NOTE — DISCHARGE NOTE PROVIDER - NSDCCAREPROVSEEN_GEN_ALL_CORE_FT
[facility] Medicine House Staff Team 8 - Attending Dr. Akin Pérez [facility] Medicine House Staff Team 8 [facility] Medicine House Staff Team 8  [facility] Advanced Gastroenterology  Dr. Winchester (Cardiology)  [facility] Nephrology

## 2023-04-26 NOTE — CONSULT NOTE ADULT - SUBJECTIVE AND OBJECTIVE BOX
HPI: 83 year old female with history of HTN, HLD, T2DM, HFpEF, CAD s/p CABG, breast cancer, rectal cancer, acute cholecystitis s/p perc rissa tube 2023, and choledocholithiasis s/p ERCP and 10F x 5cm plastic biliary stent placement 2023 presented for CHF exacerbation. Advanced GI consulted for evaluate for inpatient ERCP for stent exchange/removal once medically optimized.     Currently remains on O2, endorsing SOB at bedside. Denies abd pain, n/v/f/c.     Allergies:  CT scan dye (Hives)  penicillin (Unknown)      Home Medications:    Hospital Medications:  acetaminophen     Tablet .. 650 milliGRAM(s) Oral every 6 hours PRN  aluminum hydroxide/magnesium hydroxide/simethicone Suspension 30 milliLiter(s) Oral every 4 hours PRN  aspirin  chewable 81 milliGRAM(s) Oral daily  atorvastatin 40 milliGRAM(s) Oral at bedtime  buMETAnide 2 milliGRAM(s) Oral daily  chlorhexidine 2% Cloths 1 Application(s) Topical <User Schedule>  dextrose 5%. 1000 milliLiter(s) IV Continuous <Continuous>  dextrose 5%. 1000 milliLiter(s) IV Continuous <Continuous>  dextrose 50% Injectable 25 Gram(s) IV Push once  dextrose 50% Injectable 12.5 Gram(s) IV Push once  dextrose 50% Injectable 25 Gram(s) IV Push once  dextrose Oral Gel 15 Gram(s) Oral once PRN  ferrous    sulfate 325 milliGRAM(s) Oral daily  gabapentin 200 milliGRAM(s) Oral daily  glucagon  Injectable 1 milliGRAM(s) IntraMuscular once  heparin  Infusion.  Unit(s)/Hr IV Continuous <Continuous>  insulin glargine Injectable (LANTUS) 7 Unit(s) SubCutaneous at bedtime  insulin lispro (ADMELOG) corrective regimen sliding scale   SubCutaneous three times a day before meals  insulin lispro (ADMELOG) corrective regimen sliding scale   SubCutaneous at bedtime  insulin lispro Injectable (ADMELOG) 4 Unit(s) SubCutaneous three times a day before meals  iron sucrose IVPB 200 milliGRAM(s) IV Intermittent every 24 hours  melatonin 3 milliGRAM(s) Oral at bedtime PRN  metoprolol tartrate 100 milliGRAM(s) Oral every 12 hours  pantoprazole    Tablet 40 milliGRAM(s) Oral before breakfast  polyethylene glycol 3350 17 Gram(s) Oral daily  senna 2 Tablet(s) Oral at bedtime  sodium bicarbonate 650 milliGRAM(s) Oral daily      PMHX/PSHX:  DM (diabetes mellitus)    HTN (hypertension)    Hyperlipidemia    Obesity (BMI 35.0-39.9 without comorbidity)    Breast CA, right    Colon cancer    Lymphedema of arm    Cancer    Rectal cancer    Breast CA, right    HTN (hypertension)    Diabetes mellitus    Lymphedema of arm    Hyperlipidemia    Rectal cancer    S/P chemotherapy, time since greater than 12 weeks    S/P radiation > 12 weeks    Obese    Type II diabetes mellitus    CHF (congestive heart failure)    DVT of leg (deep venous thrombosis)    Elevated blood pressure reading in office with diagnosis of hypertension    Renal insufficiency    Gallstone    H/O mastectomy, right    History of tonsillectomy    S/P colectomy    S/P mastectomy, right    History of appendectomy    S/P ileostomy    S/P colon resection    S/P TKR (total knee replacement), right    S/P hip replacement, right    Herniated lumbar intervertebral disc        Family history:  No pertinent family history in first degree relatives    No pertinent family history in first degree relatives    Family history of diabetes mellitus in mother    Family history of diabetes mellitus in son    Family history of heart attack (Child)        Denies family history of colon cancer/polyps, stomach cancer/polyps, pancreatic cancer/masses, liver cancer/disease, ovarian cancer and endometrial cancer.    Social History:     Tob: Denies  EtOH: Denies  Illicit Drugs: Denies    ROS: see above    PHYSICAL EXAM:   Vital Signs Last 24 Hrs  T(C): 36.6 (2023 10:55), Max: 36.8 (2023 14:29)  T(F): 97.9 (2023 10:55), Max: 98.3 (2023 14:29)  HR: 72 (2023 10:55) (66 - 84)  BP: 145/79 (2023 10:55) (114/64 - 163/81)  BP(mean): --  RR: 18 (2023 10:55) (18 - 19)  SpO2: 95% (2023 10:55) (92% - 99%)    Parameters below as of 2023 10:55  Patient On (Oxygen Delivery Method): nasal cannula  O2 Flow (L/min): 2      GENERAL:  No acute distress, +NC  HEENT:  Normocephalic/atraumatic, no scleral icterus  CHEST:  + accessory muscle use intermittently during interview  HEART:  Regular rate and rhythm, no murmurs/rubs/gallops  ABDOMEN:  Soft, non-tender, non-distended  EXTREMITIES: No cyanosis, clubbing, or edema  SKIN:  No rash/warm/dry  NEURO:  Alert and oriented     Vital Signs:  Vital Signs Last 24 Hrs  T(C): 36.6 (2023 10:55), Max: 36.8 (2023 14:29)  T(F): 97.9 (2023 10:55), Max: 98.3 (2023 14:29)  HR: 72 (2023 10:55) (66 - 84)  BP: 145/79 (2023 10:55) (114/64 - 163/81)  BP(mean): --  RR: 18 (2023 10:55) (18 - 19)  SpO2: 95% (2023 10:55) (92% - 99%)    Parameters below as of 2023 10:55  Patient On (Oxygen Delivery Method): nasal cannula  O2 Flow (L/min): 2    Daily     Daily Weight in k (2023 08:11)    LABS:                        9.9    8.99  )-----------( 418      ( 2023 11:14 )             34.3     Mean Cell Volume: 92.2 fl (-23 @ 11:14)        137  |  98  |  30<H>  ----------------------------<  183<H>  3.9   |  24  |  1.68<H>    Ca    9.1      2023 06:31  Phos  3.0       Mg     1.6             PTT - ( 2023 13:32 )  PTT:43.3 sec                            9.9    8.99  )-----------( 418      ( 2023 11:14 )             34.3                         9.6    8.52  )-----------( 387      ( 2023 04:37 )             32.6                         9.1    9.25  )-----------( 370      ( 2023 01:26 )             31.5                         9.6    8.19  )-----------( 403      ( 2023 12:07 )             32.8       Imaging:

## 2023-04-26 NOTE — DISCHARGE NOTE PROVIDER - NSDCMRMEDTOKEN_GEN_ALL_CORE_FT
aspirin 81 mg oral tablet, chewable: 1 tab(s) orally once a day  atorvastatin 40 mg oral tablet: 1 tab(s) orally once a day (at bedtime)  Basaglar KwikPen 100 units/mL subcutaneous solution: 21 unit(s) subcutaneous once a day (at bedtime)  bumetanide 1 mg oral tablet: 1 tab(s) orally once a day  gabapentin 100 mg oral capsule: 2 cap(s) orally once a day  metoprolol tartrate 100 mg oral tablet: 1 tab(s) orally every 12 hours  NovoLOG FlexPen 100 units/mL injectable solution: 7 injectable 3 times a day (before meals)  pantoprazole 40 mg oral delayed release tablet: 1 tab(s) orally 2 times a day  sodium bicarbonate 650 mg oral tablet: 1 tab(s) orally once a day   apixaban 2.5 mg oral tablet: 1 tab(s) orally every 12 hours  aspirin 81 mg oral tablet, chewable: 1 tab(s) orally once a day  atorvastatin 40 mg oral tablet: 1 tab(s) orally once a day (at bedtime)  Basaglar KwikPen 100 units/mL subcutaneous solution: 21 unit(s) subcutaneous once a day (at bedtime)  bumetanide 1 mg oral tablet: 1 tab(s) orally once a day  ferrous sulfate 325 mg (65 mg elemental iron) oral tablet: 1 tab(s) orally once a day  gabapentin 100 mg oral capsule: 2 cap(s) orally once a day  metoprolol tartrate 100 mg oral tablet: 1 tab(s) orally every 12 hours  NovoLOG FlexPen 100 units/mL injectable solution: 7 injectable 3 times a day (before meals)  outpatient PT: outpatient PT  pantoprazole 40 mg oral delayed release tablet: 1 tab(s) orally 2 times a day  spironolactone 25 mg oral tablet: 0.5 tab(s) orally once a day

## 2023-04-26 NOTE — DISCHARGE NOTE PROVIDER - HOSPITAL COURSE
Ms. Batista is a 84 YO woman with PMH of HTN, HLD, DM2, HFpEF (EF 65%), CAD s/p CABG, Breast CA s/p R partial mastectomy, rectal CA s/p resection with recent admission for acute rissa s/p ERCP w/ stent c/b gallbladder perf w/ perc rissa and recurrent SVT, presenting for shortness of breath, POCUS of lungs c/w pulmonary edema, proBNP 2505. She was evaluated by cardiology and started on diuresis with improvement of symptoms. She had been scheduled for ERCP on 4/26 as outpatient so GI was consulted inpatient and [ ].     On the day of discharge, she is hemodynamically stable and medically optimized for discharge to home with outpatient PT. She will follow up with her PCP, cardiology and GI as outpatient. Ms. Batista is a 82 YO woman with PMH of HTN, HLD, DM2, HFpEF (EF 65%), CAD s/p CABG, Breast CA s/p R partial mastectomy, rectal CA s/p resection with recent admission for acute rissa s/p ERCP w/ stent c/b gallbladder perf w/ perc rissa and recurrent SVT, presenting for shortness of breath, POCUS of lungs c/w pulmonary edema, proBNP 2505. She was evaluated by cardiology and started on diuresis with improvement of symptoms. Spironolactone started. Home sodium bicarbonate discontinued due to alkalosis. She had been scheduled for ERCP on 4/26 as outpatient so GI was consulted. Initially with plan for optimization of respiratory status prior to ERCP and since patient was still requiring oxygen, RHC was done after consultation of cardiology and pulmonology. Patient cleared for ERCP from cardiology and pulmonology standpoint but GI opting to proceed with stent exchange outpatient. Mild TAO noted later in course with bumex stopped due to concern for overdiuresis; patient given light fluids and creatinine trending in right direction. Cleared by nephrology and cardiology to resume home bumex daily on 5/18 and close follow-up with nephrology within one week.    On the day of discharge, she is hemodynamically stable and medically optimized for discharge to home with outpatient PT. She will follow up with her PCP, cardiology and GI as outpatient.     Medication changes:  Stopped sodium bicarbonate  Started spironolactone 12.5 mg QD Ms. Batista is a 82 YO woman with PMH of HTN, HLD, DM2, HFpEF (EF 65%), CAD s/p CABG, Breast CA s/p R partial mastectomy, rectal CA s/p resection with recent admission for acute rissa s/p ERCP w/ stent c/b gallbladder perf w/ perc rissa and recurrent SVT, presenting for shortness of breath, POCUS of lungs c/w pulmonary edema, proBNP 2505. She was evaluated by cardiology and started on diuresis with improvement of symptoms. Spironolactone started. Home sodium bicarbonate discontinued due to alkalosis. She had been scheduled for ERCP on 4/26 as outpatient so GI was consulted. Initially with plan for optimization of respiratory status prior to ERCP and since patient was still requiring oxygen, RHC was done after consultation of cardiology and pulmonology. Patient cleared for ERCP from cardiology and pulmonology standpoint but GI opting to proceed with stent exchange outpatient. Mild TAO noted later in course with bumex stopped due to concern for overdiuresis; patient given light fluids and creatinine trending in right direction. Cleared by nephrology and cardiology to resume home bumex daily on 5/18 and close follow-up with nephrology within one week.    On the day of discharge, she is hemodynamically stable and medically optimized for discharge to home with outpatient PT. She will follow up with her PCP, cardiology and GI as outpatient.     Medication changes:  Stopped sodium bicarbonate  Started spironolactone 12.5 mg QD  Started ferrous sulfate for iron deficiency anemia Ms. Batista is a 84 YO woman with PMH of HTN, HLD, DM2, HFpEF (EF 65%), CAD s/p CABG, Breast CA s/p R partial mastectomy, rectal CA s/p resection with recent admission for acute rissa s/p ERCP w/ stent c/b gallbladder perf w/ perc rissa and recurrent SVT, presenting for shortness of breath, POCUS of lungs c/w pulmonary edema, proBNP 2505. She was evaluated by cardiology and started on diuresis with improvement of symptoms. Spironolactone started. Home sodium bicarbonate discontinued due to alkalosis. She had been scheduled for ERCP on 4/26 as outpatient so GI was consulted. Initially with plan for optimization of respiratory status prior to ERCP and since patient was still requiring oxygen, RHC was done after consultation of cardiology and pulmonology. Patient cleared for ERCP from cardiology and pulmonology standpoint but GI opting to proceed with stent exchange outpatient. Mild TAO noted later in course with bumex stopped due to concern for overdiuresis; patient given light fluids and creatinine trending in right direction. Cleared by nephrology and cardiology to resume home bumex daily on 5/18 and close follow-up with nephrology within one week.    On the day of discharge, she is hemodynamically stable and medically optimized for discharge to home with outpatient PT. She will follow up with her PCP, cardiology and GI as outpatient.     Medication changes:  Stopped sodium bicarbonate  Started spironolactone 12.5 mg QD  Started ferrous sulfate for iron deficiency anemia    Discharge Diagnoses  #Acute on chronic congestive heart failure with preserved EF  #Acute hypoxic respiratory failure  #Essential hypertension  #Diabetes Mellitus  #Hyperlipidemia  #TAO on CKD  #Anemia

## 2023-04-26 NOTE — ADVANCED PRACTICE NURSE CONSULT - REASON FOR CONSULT
Wound care consult initiated by RN to assess patient's skin for a possible sacral deep tissue injury, present on admission    Reason for Admission: Shortness of breath  History of Present Illness:   Ms. Batista is a 84 YO woman with PMH of HTN, HLD, DM2, HFpEF (EF 65%), CAD s/p CABG, Breast CA s/p R partial mastectomy, rectal CA s/p resection with previous admission for acute rissa s/p ERCP w/ stent c/b gallbladder perf w/ perc rissa and recurrent SVT, and recent admission for CHF exacerbation presenting for shortness of breath since last night. Per her daughter, she gained between 8-12 pounds since discharge from [facility] on 4/14. She denies worsening of shortness of breath when lying flat. She denies cough or sore throat. Her daughter notes she was complaining of nausea, dizziness and "fluid in her ears," no falls.

## 2023-04-26 NOTE — CONSULT NOTE ADULT - ATTENDING COMMENTS
As above.    Patient seen and examined in the early evening of 4/26/2023.  Discussed with GI fellow earlier in the day.    Impression:    #1.  Walled off necrosis, status post endoscopic necrosectomies, most recently with exchange of AXIOS lumen opposing metal stent to dual plastic biliary stents    #2.  Splenic artery pseudoaneurysm, status post IR embolization    #3.  Blood loss anemia, report of dark/black stools within the past 24 hours however photo documentation demonstrates brown stool.  Hemoglobin stable.    Recommendations:    #0.  Follow CBC    #1.  Unable to perform endoscopic necrosectomy today due to scheduling.    #2.  N.p.o. past midnight for possible endoscopic necrosectomy on 4/27/2023. As above.    Patient seen and examined in the early evening of 4/26/2023.  Discussed with GI fellow earlier in the day.    Impression:    #1.  Choledocholithiasis of large size, s/p prior biliary stent    #2.  Admit for CHF exacerbation, undergoing diuresis.    #3.  Severe pulmonary hypertension    Recommendations:    #1.  Optimize from cardiopulmonary standpoint    #2.  ERCP planned as inpatient (tentatively Weds)    #3.  Follow CBC/LFTs    #4.  Diet as tolerated, then NPO after Tuesday night.

## 2023-04-26 NOTE — PROGRESS NOTE ADULT - ATTENDING COMMENTS
84 YO woman with PMH of HTN, HLD, DM2, HFpEF (EF 65%), CAD s/p CABG, Breast CA s/p R partial mastectomy, rectal CA s/p resection with recent admission for acute rissa s/p ERCP w/ stent c/b gallbladder perf w/ perc rissa and recurrent SVT, presenting for shortness of breath,     #Acute on chronic CHF exacerbation  #Acute hypoxic respiratory failure   #H/o cholecystitis   #CKD  #DM  #Chronic Afib  #HTN  #Anemia    - presented due to SOB; s/p bumex 2mg IV in ED and placed on BiPAP; appears to be improving overall; no longer on BiPAP   - continue with bumex 2mg daily for now (patient is on 1mg at home); cardio recs appreciated, plan to give one more dose of 2mg PO bumex tonight  - strict I/O; daily weight    - GI recs appreciated; original plan for ERCP today but postponed due to being on oxygen; wean off O2 as tolerated and f/u GI for procedure as inpatient when able to; patient was originally scheduled as outpatient 4/26  - hold eliquis for now for possible procedure; heparin drip for now  - anemia panel noted; appears iron deficient; iron supplement added on; IV iron per nephro recs   - bowel regimen   - PT eval recs outpatient PT      Discussed with daughter at bedside.     Rest as above. Discussed with HS. 84 YO woman with PMH of HTN, HLD, DM2, HFpEF (EF 65%), CAD s/p CABG, Breast CA s/p R partial mastectomy, rectal CA s/p resection with recent admission for acute rissa s/p ERCP w/ stent c/b gallbladder perf w/ perc rissa and recurrent SVT, presenting for shortness of breath,     #Acute on chronic CHF exacerbation  #Acute hypoxic respiratory failure   #H/o cholecystitis   #CKD  #DM  #Chronic Afib  #HTN  #Anemia    - presented due to SOB; s/p bumex 2mg IV in ED and placed on BiPAP; appears to be improving overall; no longer on BiPAP   - continue with bumex 2mg daily for now (patient is on 1mg at home); cardio recs appreciated, plan to give one more dose of 2mg PO bumex tonight  - strict I/O; daily weight    - GI recs appreciated; original plan for ERCP today but postponed due to being on oxygen; wean off O2 as tolerated and f/u GI for procedure as inpatient when able to; patient was originally scheduled as outpatient 4/26  - hold eliquis for now for possible procedure; heparin drip for now  - anemia panel noted; appears iron deficient; iron supplement added on; IV iron per nephro recs   - bowel regimen   - PT eval recs outpatient PT    - GOC discussion; DNR/DNI; MOL drafted and placed in chart     Discussed with daughter at bedside.     Rest as above. Discussed with HS. 82 YO woman with PMH of HTN, HLD, DM2, HFpEF (EF 65%), CAD s/p CABG, Breast CA s/p R partial mastectomy, rectal CA s/p resection with recent admission for acute rissa s/p ERCP w/ stent c/b gallbladder perf w/ perc rissa and recurrent SVT, presenting for shortness of breath,     #Acute on chronic CHF exacerbation  #Acute hypoxic respiratory failure   #H/o cholecystitis   #CKD  #DM  #Chronic Afib  #HTN  #Anemia    - presented due to SOB; s/p bumex 2mg IV in ED and placed on BiPAP; appears to be improving overall; no longer on BiPAP   - continue with bumex 2mg daily for now (patient is on 1mg at home); cardio recs appreciated, plan to give one more dose of 2mg PO bumex tonight  - strict I/O; daily weight    - GI recs appreciated; original plan for ERCP today but postponed due to being on oxygen; wean off O2 as tolerated and f/u GI for procedure as inpatient when able to; patient was originally scheduled as outpatient 4/26  - hold eliquis for now for possible procedure; heparin drip for now.  - anemia panel noted; appears iron deficient; iron supplement added on; IV iron per nephro recs   - bowel regimen   - PT eval recs outpatient PT    - GOC discussion; DNR/DNI; MOLST drafted and placed in chart     Discussed with daughter at bedside.     Rest as above. Discussed with HS.

## 2023-04-26 NOTE — PROGRESS NOTE ADULT - PROBLEM SELECTOR PLAN 1
ProBNP 2505, TTE 3/23 with EF 76%, severe pulmonary hypertension, moderate diastolic dysfunction. POCUS done in ED cw pulmonary edema  - Diuresis with Bumex 2 mg qD  - Strict I&Os  - Daily weights ProBNP 2505, TTE 3/23 with EF 76%, severe pulmonary hypertension, moderate diastolic dysfunction. POCUS done in ED cw pulmonary edema  - Diuresis with Bumex 2 mg qD - additional 2 mg given today as POCUS by cardiology still with pulmonary edema  - Strict I&Os  - Daily weights

## 2023-04-26 NOTE — PROGRESS NOTE ADULT - SUBJECTIVE AND OBJECTIVE BOX
Overnight events noted  seen and examined at  the bedside ,  no distress       VITAL:  T(C): , Max: 36.8 (04-25-23 @ 14:29)  T(F): , Max: 98.3 (04-25-23 @ 14:29)  HR: 76 (04-26-23 @ 04:49)  BP: 163/81 (04-26-23 @ 04:49)  BP(mean): --  RR: 19 (04-26-23 @ 05:12)  SpO2: 96% (04-26-23 @ 05:12)  Wt(kg): --      PHYSICAL EXAM:  General: Alerted, oriented  HEENT: MMM  Lungs:CTA-b/l  Heart: RRR S1S2  Abdomen: Soft, NTND  Ext: trace  edema b/l  : no chen      LABS:                        9.6    8.52  )-----------( 387      ( 25 Apr 2023 04:37 )             32.6     Na(137)/K(3.9)/Cl(98)/HCO3(24)/BUN(30)/Cr(1.68)Glu(183)/Ca(9.1)/Mg(1.6)/PO4(3.0)    04-26 @ 06:31  Na(139)/K(4.4)/Cl(99)/HCO3(30)/BUN(32)/Cr(1.86)Glu(201)/Ca(9.2)/Mg(1.6)/PO4(3.1)    04-25 @ 20:47  Na(139)/K(4.7)/Cl(103)/HCO3(25)/BUN(42)/Cr(1.74)Glu(258)/Ca(8.8)/Mg(--)/PO4(--)    04-24 @ 12:07    ASSESSMENT:  Ms. Batista is a 84 YO woman with PMH of HTN, HLD, DM2, HFpEF (EF 65%), CAD s/p CABG, Breast CA s/p R partial mastectomy, rectal CA s/p resection with previous admission for acute rissa s/p ERCP w/ stent c/b gallbladder perf w/ perc rissa and recurrent SVT, and recent admission for CHF exacerbation presenting for shortness of breath.      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  on bumex 2 mg PO daily   keep k ~4, mag 2   avoid nephrotoxic medication  dose all medications for  GFR  30-35   ml/min  daily bmp , mag and Po4   renal diet   monitor I/Os  2)CVS: continue BP meds as ordered for now  3)start venofer 200 mg IVPB for 5 doses daily         Brendakarine Rader  [facility]  Office: [phone]       Overnight events noted  seen and examined at  the bedside ,  no distress       VITAL:  T(C): , Max: 36.8 (04-25-23 @ 14:29)  T(F): , Max: 98.3 (04-25-23 @ 14:29)  HR: 76 (04-26-23 @ 04:49)  BP: 163/81 (04-26-23 @ 04:49)  BP(mean): --  RR: 19 (04-26-23 @ 05:12)  SpO2: 96% (04-26-23 @ 05:12)  Wt(kg): --      PHYSICAL EXAM:  General: Alerted, oriented  HEENT: MMM  Lungs:CTA-b/l  Heart: RRR S1S2  Abdomen: Soft, NTND  Ext: trace  edema b/l  : no chen      LABS:                        9.6    8.52  )-----------( 387      ( 25 Apr 2023 04:37 )             32.6     Na(137)/K(3.9)/Cl(98)/HCO3(24)/BUN(30)/Cr(1.68)Glu(183)/Ca(9.1)/Mg(1.6)/PO4(3.0)    04-26 @ 06:31  Na(139)/K(4.4)/Cl(99)/HCO3(30)/BUN(32)/Cr(1.86)Glu(201)/Ca(9.2)/Mg(1.6)/PO4(3.1)    04-25 @ 20:47  Na(139)/K(4.7)/Cl(103)/HCO3(25)/BUN(42)/Cr(1.74)Glu(258)/Ca(8.8)/Mg(--)/PO4(--)    04-24 @ 12:07    ASSESSMENT:  Ms. Batista is a 84 YO woman with PMH of HTN, HLD, DM2, HFpEF (EF 65%), CAD s/p CABG, Breast CA s/p R partial mastectomy, rectal CA s/p resection with previous admission for acute rissa s/p ERCP w/ stent c/b gallbladder perf w/ perc rissa and recurrent SVT, and recent admission for CHF exacerbation presenting for shortness of breathout patient nephrologist Dr Kan .    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  on bumex 2 mg PO daily   keep k ~4, mag 2   avoid nephrotoxic medication  dose all medications for  GFR  30-35   ml/min  daily bmp , mag and Po4   renal diet   monitor I/Os  2)CVS: continue BP meds as ordered for now  3)start venofer 200 mg IVPB for 5 doses daily         [facility]  Office: [phone]

## 2023-04-26 NOTE — PROGRESS NOTE ADULT - PROBLEM SELECTOR PLAN 4
Normocytic anemia - likely 2/2 CKD  - Iron studies consistent with MILLY - will start iron supplementation Normocytic anemia - likely 2/2 CKD  - Iron studies consistent with MILLY - will start iron supplementation  - Per nephro, will start venofer for 5 days  - Bowel regimen

## 2023-04-26 NOTE — PROGRESS NOTE ADULT - PROBLEM SELECTOR PLAN 8
DVT: Per daughter, history of VTE, and history of afib, will hold Eliquis for possible procedure and start heparin drip  Dispo: PT  Diet: CC/DASH diet DVT: Per daughter, history of VTE, and history of afib, will hold Eliquis for possible procedure and start heparin drip  Dispo: PT rec outpatient PT  Diet: CC/DASH diet

## 2023-04-26 NOTE — CONSULT NOTE ADULT - ASSESSMENT
83 year old female with history of HTN, HLD, T2DM, HFpEF, CAD s/p CABG, breast cancer, rectal cancer, acute cholecystitis s/p perc rissa tube 1/25/2023, and choledocholithiasis s/p ERCP and 10F x 5cm plastic biliary stent placement 1/20/2023 presented for CHF exacerbation. [facility] consulted for evaluate for inpatient ERCP for stent exchange/removal once medically optimized.       Impression:   # Choledocholithiasis s/p 10F x 5cm plastic biliary stent placement 1/20/2023: due for repeat ERCP for stent removal/exchange, was scheduled for 4/26 however currently admitted for CHF exacerbation  # CHF exacerbation      Recommendations:   - Will consider consider ERCP for stent removal/exchange once medically optimized (can also take place as an outpatient)  - Trend liver enzymes, CBC      Thank you for involving us in the care of this patient, please reach out if any further questions.     Francisco Javier Perez MD  Gastroenterology/Hepatology Fellow, PGY6    Available on [employer] Teams  [phone] ([facility])  [id number] ([facility])  Please contact on call fellow weekdays after 5pm-7am and weekends: [phone]

## 2023-04-26 NOTE — PROGRESS NOTE ADULT - SUBJECTIVE AND OBJECTIVE BOX
Patient is a 83y old  Female who presents with a chief complaint of Shortness of breath (25 Apr 2023 11:12)      SUBJECTIVE / OVERNIGHT EVENTS:    MEDICATIONS  (STANDING):  aspirin  chewable 81 milliGRAM(s) Oral daily  atorvastatin 40 milliGRAM(s) Oral at bedtime  buMETAnide 2 milliGRAM(s) Oral daily  dextrose 5%. 1000 milliLiter(s) (100 mL/Hr) IV Continuous <Continuous>  dextrose 5%. 1000 milliLiter(s) (50 mL/Hr) IV Continuous <Continuous>  dextrose 50% Injectable 25 Gram(s) IV Push once  dextrose 50% Injectable 12.5 Gram(s) IV Push once  dextrose 50% Injectable 25 Gram(s) IV Push once  ferrous    sulfate 325 milliGRAM(s) Oral daily  gabapentin 200 milliGRAM(s) Oral daily  glucagon  Injectable 1 milliGRAM(s) IntraMuscular once  heparin  Infusion.  Unit(s)/Hr (15 mL/Hr) IV Continuous <Continuous>  insulin glargine Injectable (LANTUS) 7 Unit(s) SubCutaneous at bedtime  insulin lispro (ADMELOG) corrective regimen sliding scale   SubCutaneous three times a day before meals  insulin lispro (ADMELOG) corrective regimen sliding scale   SubCutaneous at bedtime  insulin lispro Injectable (ADMELOG) 4 Unit(s) SubCutaneous three times a day before meals  metoprolol tartrate 100 milliGRAM(s) Oral every 12 hours  pantoprazole    Tablet 40 milliGRAM(s) Oral before breakfast  polyethylene glycol 3350 17 Gram(s) Oral daily  senna 2 Tablet(s) Oral at bedtime  sodium bicarbonate 650 milliGRAM(s) Oral daily    MEDICATIONS  (PRN):  acetaminophen     Tablet .. 650 milliGRAM(s) Oral every 6 hours PRN Temp greater or equal to 38C (100.4F), Mild Pain (1 - 3)  aluminum hydroxide/magnesium hydroxide/simethicone Suspension 30 milliLiter(s) Oral every 4 hours PRN Dyspepsia  dextrose Oral Gel 15 Gram(s) Oral once PRN Blood Glucose LESS THAN 70 milliGRAM(s)/deciliter  heparin   Injectable 6500 Unit(s) IV Push every 6 hours PRN For aPTT less than 40  heparin   Injectable 3000 Unit(s) IV Push every 6 hours PRN For aPTT between 40 - 57  melatonin 3 milliGRAM(s) Oral at bedtime PRN Insomnia      CAPILLARY BLOOD GLUCOSE      POCT Blood Glucose.: 203 mg/dL (25 Apr 2023 21:00)  POCT Blood Glucose.: 202 mg/dL (25 Apr 2023 17:06)  POCT Blood Glucose.: 233 mg/dL (25 Apr 2023 11:24)  POCT Blood Glucose.: 123 mg/dL (25 Apr 2023 07:58)    I&O's Summary    25 Apr 2023 07:01  -  26 Apr 2023 07:00  --------------------------------------------------------  IN: 360 mL / OUT: 1500 mL / NET: -1140 mL        Vital Signs Last 24 Hrs  T(C): 36.8 (26 Apr 2023 04:49), Max: 36.8 (25 Apr 2023 14:29)  T(F): 98.3 (26 Apr 2023 04:49), Max: 98.3 (25 Apr 2023 14:29)  HR: 76 (26 Apr 2023 04:49) (65 - 84)  BP: 163/81 (26 Apr 2023 04:49) (114/64 - 163/81)  BP(mean): --  RR: 19 (26 Apr 2023 05:12) (17 - 19)  SpO2: 96% (26 Apr 2023 05:12) (92% - 99%)    Parameters below as of 26 Apr 2023 05:12  Patient On (Oxygen Delivery Method): nasal cannula,2L        PHYSICAL EXAM:  GENERAL: Lying in bed in no acute distress, wearing 3L NC  EYES: Conjunctiva noninjected or pale, sclera anicteric  HENT: NC/AT, moist mucous membranes  NECK: Supple, trachea midline  LUNG: Nonlabored respirations, no wheezes, rales  CV: RRR, Pulses- Radial: 2+ b/l.   ABDOMEN: Nondistended, nontender  MSK: No visible deformities, nontender extremities.  SKIN: No rashes, bruises  NEURO: AAOx4 (to person, place, time, event), no tremor    LABS:                        9.6    8.52  )-----------( 387      ( 25 Apr 2023 04:37 )             32.6      04-25    139  |  99  |  32<H>  ----------------------------<  201<H>  4.4   |  30  |  1.86<H>    Ca    9.2      25 Apr 2023 20:47  Phos  3.1     04-25  Mg     1.6     04-25    TPro  7.3  /  Alb  3.4  /  TBili  0.2  /  DBili  x   /  AST  46<H>  /  ALT  37  /  AlkPhos  139<H>  04-24    PT/INR - ( 24 Apr 2023 12:07 )   PT: 12.8 sec;   INR: 1.11 ratio         PTT - ( 26 Apr 2023 06:32 )  PTT:52.1 sec          RADIOLOGY & ADDITIONAL TESTS:    Imaging Personally Reviewed:    Consultant(s) Notes Reviewed:      Care Discussed with Consultants/Other Providers:   Patient is a 83y old  Female who presents with a chief complaint of Shortness of breath (25 Apr 2023 11:12)      SUBJECTIVE / OVERNIGHT EVENTS: No acute events overnight. Patient seen and examined at bedside, feels shortness of breath is a little better. Urinating well. Still has feeling of fluid in ears, no dizziness.     MEDICATIONS  (STANDING):  aspirin  chewable 81 milliGRAM(s) Oral daily  atorvastatin 40 milliGRAM(s) Oral at bedtime  buMETAnide 2 milliGRAM(s) Oral daily  dextrose 5%. 1000 milliLiter(s) (100 mL/Hr) IV Continuous <Continuous>  dextrose 5%. 1000 milliLiter(s) (50 mL/Hr) IV Continuous <Continuous>  dextrose 50% Injectable 25 Gram(s) IV Push once  dextrose 50% Injectable 12.5 Gram(s) IV Push once  dextrose 50% Injectable 25 Gram(s) IV Push once  ferrous    sulfate 325 milliGRAM(s) Oral daily  gabapentin 200 milliGRAM(s) Oral daily  glucagon  Injectable 1 milliGRAM(s) IntraMuscular once  heparin  Infusion.  Unit(s)/Hr (15 mL/Hr) IV Continuous <Continuous>  insulin glargine Injectable (LANTUS) 7 Unit(s) SubCutaneous at bedtime  insulin lispro (ADMELOG) corrective regimen sliding scale   SubCutaneous three times a day before meals  insulin lispro (ADMELOG) corrective regimen sliding scale   SubCutaneous at bedtime  insulin lispro Injectable (ADMELOG) 4 Unit(s) SubCutaneous three times a day before meals  metoprolol tartrate 100 milliGRAM(s) Oral every 12 hours  pantoprazole    Tablet 40 milliGRAM(s) Oral before breakfast  polyethylene glycol 3350 17 Gram(s) Oral daily  senna 2 Tablet(s) Oral at bedtime  sodium bicarbonate 650 milliGRAM(s) Oral daily    MEDICATIONS  (PRN):  acetaminophen     Tablet .. 650 milliGRAM(s) Oral every 6 hours PRN Temp greater or equal to 38C (100.4F), Mild Pain (1 - 3)  aluminum hydroxide/magnesium hydroxide/simethicone Suspension 30 milliLiter(s) Oral every 4 hours PRN Dyspepsia  dextrose Oral Gel 15 Gram(s) Oral once PRN Blood Glucose LESS THAN 70 milliGRAM(s)/deciliter  heparin   Injectable 6500 Unit(s) IV Push every 6 hours PRN For aPTT less than 40  heparin   Injectable 3000 Unit(s) IV Push every 6 hours PRN For aPTT between 40 - 57  melatonin 3 milliGRAM(s) Oral at bedtime PRN Insomnia      CAPILLARY BLOOD GLUCOSE      POCT Blood Glucose.: 203 mg/dL (25 Apr 2023 21:00)  POCT Blood Glucose.: 202 mg/dL (25 Apr 2023 17:06)  POCT Blood Glucose.: 233 mg/dL (25 Apr 2023 11:24)  POCT Blood Glucose.: 123 mg/dL (25 Apr 2023 07:58)    I&O's Summary    25 Apr 2023 07:01  -  26 Apr 2023 07:00  --------------------------------------------------------  IN: 360 mL / OUT: 1500 mL / NET: -1140 mL        Vital Signs Last 24 Hrs  T(C): 36.8 (26 Apr 2023 04:49), Max: 36.8 (25 Apr 2023 14:29)  T(F): 98.3 (26 Apr 2023 04:49), Max: 98.3 (25 Apr 2023 14:29)  HR: 76 (26 Apr 2023 04:49) (65 - 84)  BP: 163/81 (26 Apr 2023 04:49) (114/64 - 163/81)  BP(mean): --  RR: 19 (26 Apr 2023 05:12) (17 - 19)  SpO2: 96% (26 Apr 2023 05:12) (92% - 99%)    Parameters below as of 26 Apr 2023 05:12  Patient On (Oxygen Delivery Method): nasal cannula,2L        PHYSICAL EXAM:  GENERAL: Sitting in bed in no acute distress, wearing 2L NC  EYES: Conjunctiva noninjected or pale, sclera anicteric  HENT: NC/AT, moist mucous membranes  NECK: Supple, trachea midline  LUNG: Nonlabored respirations, no wheezes, rales  CV: RRR, Pulses- Radial: 2+ b/l.   ABDOMEN: Nondistended, nontender  MSK: No visible deformities, nontender extremities.  SKIN: No rashes, bruises  NEURO: AAOx4 (to person, place, time, event), no tremor    LABS:                        9.6    8.52  )-----------( 387      ( 25 Apr 2023 04:37 )             32.6      04-25    139  |  99  |  32<H>  ----------------------------<  201<H>  4.4   |  30  |  1.86<H>    Ca    9.2      25 Apr 2023 20:47  Phos  3.1     04-25  Mg     1.6     04-25    TPro  7.3  /  Alb  3.4  /  TBili  0.2  /  DBili  x   /  AST  46<H>  /  ALT  37  /  AlkPhos  139<H>  04-24    PT/INR - ( 24 Apr 2023 12:07 )   PT: 12.8 sec;   INR: 1.11 ratio         PTT - ( 26 Apr 2023 06:32 )  PTT:52.1 sec          RADIOLOGY & ADDITIONAL TESTS:    Imaging Personally Reviewed:    Consultant(s) Notes Reviewed:      Care Discussed with Consultants/Other Providers:

## 2023-04-26 NOTE — PROGRESS NOTE ADULT - PROBLEM SELECTOR PLAN 3
- ERCP was planned for outpatient 4/26  - GI aware, will await cardiac clearance - ERCP was planned for outpatient 4/26  - GI aware, will await improvement in respiratory function

## 2023-04-26 NOTE — PROGRESS NOTE ADULT - SUBJECTIVE AND OBJECTIVE BOX
DATE OF SERVICE: 04-26-23 @ 12:38    Patient is a 83y old  Female who presents with a chief complaint of Shortness of breath (26 Apr 2023 10:10)      INTERVAL HISTORY: Feels ok. Niece at bedside.     REVIEW OF SYSTEMS:  CONSTITUTIONAL: No weakness  EYES/ENT: No visual changes;  No throat pain   NECK: No pain or stiffness  RESPIRATORY: No cough, wheezing; No shortness of breath  CARDIOVASCULAR: No chest pain or palpitations  GASTROINTESTINAL: No abdominal  pain. No nausea, vomiting, or hematemesis  GENITOURINARY: No dysuria, frequency or hematuria  NEUROLOGICAL: No stroke like symptoms  SKIN: No rashes    TELEMETRY Personally reviewed: SR 60-80  	  MEDICATIONS:  buMETAnide 2 milliGRAM(s) Oral daily  metoprolol tartrate 100 milliGRAM(s) Oral every 12 hours        PHYSICAL EXAM:  T(C): 36.6 (04-26-23 @ 10:55), Max: 36.8 (04-25-23 @ 14:29)  HR: 72 (04-26-23 @ 10:55) (66 - 84)  BP: 145/79 (04-26-23 @ 10:55) (114/64 - 163/81)  RR: 18 (04-26-23 @ 10:55) (18 - 19)  SpO2: 95% (04-26-23 @ 10:55) (92% - 99%)  Wt(kg): --  I&O's Summary    25 Apr 2023 07:01  -  26 Apr 2023 07:00  --------------------------------------------------------  IN: 360 mL / OUT: 1500 mL / NET: -1140 mL    26 Apr 2023 07:01  -  26 Apr 2023 12:38  --------------------------------------------------------  IN: 240 mL / OUT: 0 mL / NET: 240 mL          Appearance: In no distress	  HEENT:    PERRL, EOMI	  Cardiovascular:  S1 S2, No JVD  Respiratory: diminished B/L  Gastrointestinal:  Soft, Non-tender, + BS	  Vascularature:  + edema of RUE  Psychiatric: Appropriate affect   Neuro: no acute focal deficits                               9.9    8.99  )-----------( 418      ( 26 Apr 2023 11:14 )             34.3     04-26    137  |  98  |  30<H>  ----------------------------<  183<H>  3.9   |  24  |  1.68<H>    Ca    9.1      26 Apr 2023 06:31  Phos  3.0     04-26  Mg     1.6     04-26          Labs personally reviewed    < from: Xray Chest 1 View AP/PA (04.24.23 @ 12:13) >  FINDINGS:  Median sternotomy. Right axillary clips.  The heart is normal in size.  No focal consolidation.  There is no pneumothorax or pleural effusion.    IMPRESSION:  No focal consolidation.    < end of copied text >  < from: CT Chest No Cont (03.24.23 @ 16:15) >  IMPRESSION:  Pulmonary edema with moderate pleural effusions, increased since 2/3/2023   abdominal CT. No pneumonia.    < end of copied text >  < from: TTE with Doppler (w/Cont) (03.23.23 @ 09:48) >  Conclusions:  1. Mild mitral annular calcification. Tethered mitral valve  leaflets with normal opening. Minimal mitral regurgitation.  2. Calcified trileaflet aortic valve with normal opening.  No aortic valve regurgitation seen.  3. Endocardial visualization enhanced with intravenous  injection of Ultrasonic Enhancing Agent (Definity). Overall  hyperdynamic left ventricular systolic function. The basal  inferoseptum is hypokinetic.  4. The right ventricle is not well visualized; grossly,  right ventricle appears mildly enlarged with mildly  decreased right ventricular systolic function.  5. Estimated right ventricular systolic pressure equals 62  mm Hg, assuming right atrial pressure equals 3 mm Hg,  consistent with severe pulmonary hypertension.  6. Bilateral pleural effusions.  *** Compared with echocardiogram of 1/30/2023, results are  similar on today's study.    < end of copied text >  < from: Cardiac Cath Lab - Adult (11.05.20 @ 15:13) >  CORONARY VESSELS: The coronary circulation is right dominant.  LM:   --  LM: Angiography showed moderate atherosclerosis.  LAD:   --  Proximal LAD: There was a diffuse 60 % stenosis.  CX:   --  Ostial circumflex: There was a 90 % stenosis.  RCA:   --  Ostial RCA: There was a 90 % stenosis.  COMPLICATIONS: There were no complications.  DIAGNOSTIC RECOMMENDATIONS: Consultation with a cardiac surgeon will be  obtained for coronary artery bypass grafting.    < end of copied text >  Bypass graft, coronary artery, 1 arterial and 2 venous 09-Nov-2020 19:34:33 CABG X 2 (LIMA->LAD, SVG->RCA) Nadja Lacey.    ASSESSMENT/PLAN: 	    Ms. Batista is a 82 YO woman with PMH of HTN, HLD, DM2, HFpEF (EF 65%), CAD s/p CABG, Breast CA s/p R partial mastectomy, rectal CA s/p resection with recent admission for acute rissa s/p ERCP w/ stent c/b gallbladder perf w/ perc rissa and recurrent SVT, presenting for shortness of breath. Denies CP, palpitations, orthopnea or syncope. Follows as OP with Dr. Hagan.    Problem/Plan -1  Problem: Acute on Chronic Diastolic CHF  - POCUS suggestive of pulm edema  - CXR with no focal consolidations  - proBNP 2505  - Prior TTE 3/23 shows preserved EF, hyperdynamic LV function, basal inferoseptum hypokinesis, decreased RV systolic function severe pulm pressures, B/L pleural effusions  - c/w Bumex 2mg PO daily  - Strict I&Os, daily weights  - Wean supplemental oxygen as tolerated    Problem/Plan -2  Problem: CAD   - s/p CABG 2020  - ECG with no ischemia noted  - Trop 21  - c/w ASA, metoprolol and statin  - Formal TTE pending    Problem/Plan -3  Problem: hx of Recurrent SVT  - c/w Metoprolol 100mg PO BID  - cont to monitor on tele    Problem/Plan -4  Problem: Atrial Fibrillation  - Patient with n/o A-flutter on previous admission  - c/w Metoprolol   - eliquis 2.5mg PO BID on hold for planned ERCP  - c/w Heparin gtt          Delaney Vasquez, AG-NP   Sergey Winchester DO [facility]  Cardiovascular Medicine  [address], [address]  Available through call or text on [employer] TEAM[employer]  Office: [phone]   DATE OF SERVICE: 04-26-23 @ 12:38    Patient is a 83y old  Female who presents with a chief complaint of Shortness of breath (26 Apr 2023 10:10)      INTERVAL HISTORY: Feels ok. Niece at bedside.     REVIEW OF SYSTEMS:  CONSTITUTIONAL: No weakness  EYES/ENT: No visual changes;  No throat pain   NECK: No pain or stiffness  RESPIRATORY: No cough, wheezing; No shortness of breath  CARDIOVASCULAR: No chest pain or palpitations  GASTROINTESTINAL: No abdominal  pain. No nausea, vomiting, or hematemesis  GENITOURINARY: No dysuria, frequency or hematuria  NEUROLOGICAL: No stroke like symptoms  SKIN: No rashes    TELEMETRY Personally reviewed: SR 60-80  	  MEDICATIONS:  buMETAnide 2 milliGRAM(s) Oral daily  metoprolol tartrate 100 milliGRAM(s) Oral every 12 hours        PHYSICAL EXAM:  T(C): 36.6 (04-26-23 @ 10:55), Max: 36.8 (04-25-23 @ 14:29)  HR: 72 (04-26-23 @ 10:55) (66 - 84)  BP: 145/79 (04-26-23 @ 10:55) (114/64 - 163/81)  RR: 18 (04-26-23 @ 10:55) (18 - 19)  SpO2: 95% (04-26-23 @ 10:55) (92% - 99%)  Wt(kg): --  I&O's Summary    25 Apr 2023 07:01  -  26 Apr 2023 07:00  --------------------------------------------------------  IN: 360 mL / OUT: 1500 mL / NET: -1140 mL    26 Apr 2023 07:01  -  26 Apr 2023 12:38  --------------------------------------------------------  IN: 240 mL / OUT: 0 mL / NET: 240 mL          Appearance: In no distress	  HEENT:    PERRL, EOMI	  Cardiovascular:  S1 S2, No JVD  Respiratory: diminished B/L  Gastrointestinal:  Soft, Non-tender, + BS	  Vascularature:  + edema of RUE  Psychiatric: Appropriate affect   Neuro: no acute focal deficits                               9.9    8.99  )-----------( 418      ( 26 Apr 2023 11:14 )             34.3     04-26    137  |  98  |  30<H>  ----------------------------<  183<H>  3.9   |  24  |  1.68<H>    Ca    9.1      26 Apr 2023 06:31  Phos  3.0     04-26  Mg     1.6     04-26          Labs personally reviewed    < from: Xray Chest 1 View AP/PA (04.24.23 @ 12:13) >  FINDINGS:  Median sternotomy. Right axillary clips.  The heart is normal in size.  No focal consolidation.  There is no pneumothorax or pleural effusion.    IMPRESSION:  No focal consolidation.    < end of copied text >  < from: CT Chest No Cont (03.24.23 @ 16:15) >  IMPRESSION:  Pulmonary edema with moderate pleural effusions, increased since 2/3/2023   abdominal CT. No pneumonia.    < end of copied text >  < from: TTE with Doppler (w/Cont) (03.23.23 @ 09:48) >  Conclusions:  1. Mild mitral annular calcification. Tethered mitral valve  leaflets with normal opening. Minimal mitral regurgitation.  2. Calcified trileaflet aortic valve with normal opening.  No aortic valve regurgitation seen.  3. Endocardial visualization enhanced with intravenous  injection of Ultrasonic Enhancing Agent (Definity). Overall  hyperdynamic left ventricular systolic function. The basal  inferoseptum is hypokinetic.  4. The right ventricle is not well visualized; grossly,  right ventricle appears mildly enlarged with mildly  decreased right ventricular systolic function.  5. Estimated right ventricular systolic pressure equals 62  mm Hg, assuming right atrial pressure equals 3 mm Hg,  consistent with severe pulmonary hypertension.  6. Bilateral pleural effusions.  *** Compared with echocardiogram of 1/30/2023, results are  similar on today's study.    < end of copied text >  < from: Cardiac Cath Lab - Adult (11.05.20 @ 15:13) >  CORONARY VESSELS: The coronary circulation is right dominant.  LM:   --  LM: Angiography showed moderate atherosclerosis.  LAD:   --  Proximal LAD: There was a diffuse 60 % stenosis.  CX:   --  Ostial circumflex: There was a 90 % stenosis.  RCA:   --  Ostial RCA: There was a 90 % stenosis.  COMPLICATIONS: There were no complications.  DIAGNOSTIC RECOMMENDATIONS: Consultation with a cardiac surgeon will be  obtained for coronary artery bypass grafting.    < end of copied text >  Bypass graft, coronary artery, 1 arterial and 2 venous 09-Nov-2020 19:34:33 CABG X 2 (LIMA->LAD, SVG->RCA) Nadja Lacey.    ASSESSMENT/PLAN: 	    Ms. Batista is a 82 YO woman with PMH of HTN, HLD, DM2, HFpEF (EF 65%), CAD s/p CABG, Breast CA s/p R partial mastectomy, rectal CA s/p resection with recent admission for acute rissa s/p ERCP w/ stent c/b gallbladder perf w/ perc rissa and recurrent SVT, presenting for shortness of breath. Denies CP, palpitations, orthopnea or syncope. Follows as OP with Dr. Hagan.    Problem/Plan -1  Problem: Acute on Chronic Diastolic CHF  - POCUS suggestive of pulm edema  - CXR with no focal consolidations  - proBNP 2505  - Prior TTE 3/23 shows preserved EF, hyperdynamic LV function, basal inferoseptum hypokinesis, decreased RV systolic function severe pulm pressures, B/L pleural effusions  - c/w Bumex 2mg PO daily; Will give an additional dose of Bumex 2m PO this evening.   - Strict I&Os, daily weights  - Wean supplemental oxygen as tolerated    Problem/Plan -2  Problem: CAD   - s/p CABG 2020  - ECG with no ischemia noted  - Trop 21  - c/w ASA, metoprolol and statin  - Formal TTE pending    Problem/Plan -3  Problem: hx of Recurrent SVT  - c/w Metoprolol 100mg PO BID  - cont to monitor on tele    Problem/Plan -4  Problem: Atrial Fibrillation  - Patient with n/o A-flutter on previous admission  - c/w Metoprolol   - eliquis 2.5mg PO BID on hold for planned ERCP  - c/w Heparin gtt          Delaney Vasquez, FABIOLA-SEE Winchester,  [facility]  Cardiovascular Medicine  [address], [address]  Available through call or text on [employer] TE[employer]  Office: [phone]

## 2023-04-26 NOTE — ADVANCED PRACTICE NURSE CONSULT - ASSESSMENT
When wound care RN arrived on unit, patient was found sitting on the edge of a low air loss pressure redistribution support surface style bed. Patient speaks Gibraltarian was alert and oriented and gave consent to skin consult.  used for interpretation, Leona ID # [id number]. This patient stood at the bedside for wound care examination. Once standing, urinary incontinence was noted as evidenced by wet sheets, pericare completed. The wound care RN was able to visualize an area of persistent nonblanchable deep red colored erythema on B/L buttocks/sacral skin, area measures approximately 4cm x 4cm x 0cm- unable to rule out a deep tissue injury at this time, incontinence involvement apparent. Once consult was complete, patient was placed in a right side-lying position utilizing pillow positioner assistive devices.

## 2023-04-26 NOTE — DISCHARGE NOTE PROVIDER - NSDCCPTREATMENT_GEN_ALL_CORE_FT
PRINCIPAL PROCEDURE  Procedure: Ultrasound, chest  Findings and Treatment: IMPRESSION:  Diffuse b lines and bilateral pleural effusions suggest pulmonary edema.  No global LV systolic hypokinesis.     PRINCIPAL PROCEDURE  Procedure: Ultrasound, chest  Findings and Treatment: IMPRESSION:  Diffuse b lines and bilateral pleural effusions suggest pulmonary edema.  No global LV systolic hypokinesis.      SECONDARY PROCEDURE  Procedure: Transthoracic echo  Findings and Treatment:   < end of copied text >   1. Normal left ventricular cavity size. The left ventricular wall thickness is normal. The left ventricular systolic function is normal with an ejection fraction of 62 % by 3D. There are no regional wall motion abnormalities seen.   2. There is normal left ventricular diastolic function.   3. Normal right ventricular cavity size and normal systolic function. The tricuspid annular plane systolic excursion (TAPSE) is 0.9 cm (normal >=1.7 cm).   4. Moderate pulmonary hypertension. Pulmonary artery systolic pressure 56 mmHg.< from: TTE W or WO Ultrasound Enhancing Agent (05.06.23 @ 07:21) >

## 2023-04-26 NOTE — ADVANCED PRACTICE NURSE CONSULT - RECOMMEDATIONS
Impression:    B/L buttocks/sacral deep red erythema, cannot rule out deep tissue injury, present on admission  incontinence associated dermatitis    Recommendations:    1) continue turning and positioning q2 and PRN utilizing offloading assistive devices  2) continue with routine pericare daily and PRN soiling  3) encourage optimal nutrition  4) waffle cushion when oob to chair  5) B/L LE complete cair air fluidized boots to offload heels/feet  6) triad protective barrier cream to B/L buttocks/sacrum daily and PRN soiling  7) incontinence management - continue external female urinary catheter to divert urine from skin if incontinent  8) limit diaper usage to while patient is ambulating only, then remove    Plan discussed with MARK Snyder on unit

## 2023-04-26 NOTE — DISCHARGE NOTE PROVIDER - NSDCFUSCHEDAPPT_GEN_ALL_CORE_FT
[facility]  [facility] [address]  Scheduled Appointment: 05/23/2023     Katie Espinoza  [facility]  [facility] [address]  Scheduled Appointment: 05/23/2023    Ld Hagan  [facility]  CARDIOLOGY [address]  Scheduled Appointment: 08/07/2023

## 2023-04-26 NOTE — DISCHARGE NOTE PROVIDER - PROVIDER TOKENS
PROVIDER:[TOKEN:[[id number]],FOLLOWUP:[2 weeks]],PROVIDER:[TOKEN:[[id number]],FOLLOWUP:[2 weeks],ESTABLISHEDPATIENT:[T]] PROVIDER:[TOKEN:[[id number]],FOLLOWUP:[2 weeks]],PROVIDER:[TOKEN:[[id number]],FOLLOWUP:[2 weeks],ESTABLISHEDPATIENT:[T]],PROVIDER:[TOKEN:[[id number]],FOLLOWUP:[2 weeks],ESTABLISHEDPATIENT:[T]],PROVIDER:[TOKEN:[[id number]:[1 week],ESTABLISHEDPATIENT:[T]]

## 2023-04-27 LAB
ALBUMIN SERPL ELPH-MCNC: 3.6 G/DL — SIGNIFICANT CHANGE UP (ref 3.3–5)
ALP SERPL-CCNC: 137 U/L — HIGH (ref 40–120)
ALT FLD-CCNC: 23 U/L — SIGNIFICANT CHANGE UP (ref 10–45)
ANION GAP SERPL CALC-SCNC: 8 MMOL/L — SIGNIFICANT CHANGE UP (ref 5–17)
APTT BLD: 103.2 SEC — HIGH (ref 27.5–35.5)
APTT BLD: 104.7 SEC — HIGH (ref 27.5–35.5)
APTT BLD: 113.9 SEC — HIGH (ref 27.5–35.5)
AST SERPL-CCNC: 19 U/L — SIGNIFICANT CHANGE UP (ref 10–40)
BILIRUB SERPL-MCNC: 0.3 MG/DL — SIGNIFICANT CHANGE UP (ref 0.2–1.2)
BUN SERPL-MCNC: 27 MG/DL — HIGH (ref 7–23)
CALCIUM SERPL-MCNC: 9.3 MG/DL — SIGNIFICANT CHANGE UP (ref 8.4–10.5)
CHLORIDE SERPL-SCNC: 99 MMOL/L — SIGNIFICANT CHANGE UP (ref 96–108)
CO2 SERPL-SCNC: 34 MMOL/L — HIGH (ref 22–31)
CREAT SERPL-MCNC: 1.67 MG/DL — HIGH (ref 0.5–1.3)
EGFR: 30 ML/MIN/1.73M2 — LOW
GLUCOSE BLDC GLUCOMTR-MCNC: 123 MG/DL — HIGH (ref 70–99)
GLUCOSE BLDC GLUCOMTR-MCNC: 143 MG/DL — HIGH (ref 70–99)
GLUCOSE BLDC GLUCOMTR-MCNC: 169 MG/DL — HIGH (ref 70–99)
GLUCOSE BLDC GLUCOMTR-MCNC: 195 MG/DL — HIGH (ref 70–99)
GLUCOSE SERPL-MCNC: 136 MG/DL — HIGH (ref 70–99)
HCT VFR BLD CALC: 34.1 % — LOW (ref 34.5–45)
HGB BLD-MCNC: 9.8 G/DL — LOW (ref 11.5–15.5)
MAGNESIUM SERPL-MCNC: 1.6 MG/DL — SIGNIFICANT CHANGE UP (ref 1.6–2.6)
MCHC RBC-ENTMCNC: 26.2 PG — LOW (ref 27–34)
MCHC RBC-ENTMCNC: 28.7 GM/DL — LOW (ref 32–36)
MCV RBC AUTO: 91.2 FL — SIGNIFICANT CHANGE UP (ref 80–100)
MRSA PCR RESULT.: DETECTED
NRBC # BLD: 0 /100 WBCS — SIGNIFICANT CHANGE UP (ref 0–0)
PHOSPHATE SERPL-MCNC: 3.6 MG/DL — SIGNIFICANT CHANGE UP (ref 2.5–4.5)
PLATELET # BLD AUTO: 355 K/UL — SIGNIFICANT CHANGE UP (ref 150–400)
POTASSIUM SERPL-MCNC: 4 MMOL/L — SIGNIFICANT CHANGE UP (ref 3.5–5.3)
POTASSIUM SERPL-SCNC: 4 MMOL/L — SIGNIFICANT CHANGE UP (ref 3.5–5.3)
PROT SERPL-MCNC: 7.5 G/DL — SIGNIFICANT CHANGE UP (ref 6–8.3)
RBC # BLD: 3.74 M/UL — LOW (ref 3.8–5.2)
RBC # FLD: 15.3 % — HIGH (ref 10.3–14.5)
S AUREUS DNA NOSE QL NAA+PROBE: DETECTED
SODIUM SERPL-SCNC: 141 MMOL/L — SIGNIFICANT CHANGE UP (ref 135–145)
WBC # BLD: 8.08 K/UL — SIGNIFICANT CHANGE UP (ref 3.8–10.5)
WBC # FLD AUTO: 8.08 K/UL — SIGNIFICANT CHANGE UP (ref 3.8–10.5)

## 2023-04-27 PROCEDURE — 99233 SBSQ HOSP IP/OBS HIGH 50: CPT | Mod: GC

## 2023-04-27 RX ORDER — METHYLPREDNISOLONE 4 MG
500 TABLET ORAL ONCE
Refills: 0 | Status: COMPLETED | OUTPATIENT
Start: 2023-04-27 | End: 2023-04-27

## 2023-04-27 RX ORDER — BUMETANIDE 0.25 MG/ML
2 INJECTION INTRAMUSCULAR; INTRAVENOUS
Refills: 0 | Status: DISCONTINUED | OUTPATIENT
Start: 2023-04-27 | End: 2023-04-28

## 2023-04-27 RX ORDER — MUPIROCIN 20 MG/G
1 OINTMENT TOPICAL
Refills: 0 | Status: COMPLETED | OUTPATIENT
Start: 2023-04-27 | End: 2023-05-02

## 2023-04-27 RX ORDER — MAGNESIUM SULFATE 500 MG/ML
2 VIAL (ML) INJECTION ONCE
Refills: 0 | Status: DISCONTINUED | OUTPATIENT
Start: 2023-04-27 | End: 2023-04-27

## 2023-04-27 RX ORDER — HEPARIN SODIUM 5000 [USP'U]/ML
1200 INJECTION INTRAVENOUS; SUBCUTANEOUS
Qty: 25000 | Refills: 0 | Status: DISCONTINUED | OUTPATIENT
Start: 2023-04-27 | End: 2023-05-10

## 2023-04-27 RX ADMIN — INSULIN GLARGINE 7 UNIT(S): 100 INJECTION, SOLUTION SUBCUTANEOUS at 21:08

## 2023-04-27 RX ADMIN — CHLORHEXIDINE GLUCONATE 1 APPLICATION(S): 213 SOLUTION TOPICAL at 05:47

## 2023-04-27 RX ADMIN — Medication 4 UNIT(S): at 12:13

## 2023-04-27 RX ADMIN — Medication 325 MILLIGRAM(S): at 12:16

## 2023-04-27 RX ADMIN — PANTOPRAZOLE SODIUM 40 MILLIGRAM(S): 20 TABLET, DELAYED RELEASE ORAL at 05:47

## 2023-04-27 RX ADMIN — IRON SUCROSE 110 MILLIGRAM(S): 20 INJECTION, SOLUTION INTRAVENOUS at 13:14

## 2023-04-27 RX ADMIN — HEPARIN SODIUM 1200 UNIT(S)/HR: 5000 INJECTION INTRAVENOUS; SUBCUTANEOUS at 13:14

## 2023-04-27 RX ADMIN — SENNA PLUS 2 TABLET(S): 8.6 TABLET ORAL at 21:08

## 2023-04-27 RX ADMIN — HEPARIN SODIUM 1200 UNIT(S)/HR: 5000 INJECTION INTRAVENOUS; SUBCUTANEOUS at 19:10

## 2023-04-27 RX ADMIN — Medication 4 UNIT(S): at 08:17

## 2023-04-27 RX ADMIN — BUMETANIDE 2 MILLIGRAM(S): 0.25 INJECTION INTRAMUSCULAR; INTRAVENOUS at 05:46

## 2023-04-27 RX ADMIN — Medication 100 MILLIGRAM(S): at 17:44

## 2023-04-27 RX ADMIN — HEPARIN SODIUM 1000 UNIT(S)/HR: 5000 INJECTION INTRAVENOUS; SUBCUTANEOUS at 19:25

## 2023-04-27 RX ADMIN — Medication 1: at 08:18

## 2023-04-27 RX ADMIN — Medication 650 MILLIGRAM(S): at 12:15

## 2023-04-27 RX ADMIN — MUPIROCIN 1 APPLICATION(S): 20 OINTMENT TOPICAL at 22:05

## 2023-04-27 RX ADMIN — BUMETANIDE 2 MILLIGRAM(S): 0.25 INJECTION INTRAMUSCULAR; INTRAVENOUS at 17:44

## 2023-04-27 RX ADMIN — Medication 4 UNIT(S): at 17:42

## 2023-04-27 RX ADMIN — GABAPENTIN 200 MILLIGRAM(S): 400 CAPSULE ORAL at 12:16

## 2023-04-27 RX ADMIN — Medication 81 MILLIGRAM(S): at 12:15

## 2023-04-27 RX ADMIN — Medication 100 MILLIGRAM(S): at 05:46

## 2023-04-27 RX ADMIN — HEPARIN SODIUM 1400 UNIT(S)/HR: 5000 INJECTION INTRAVENOUS; SUBCUTANEOUS at 07:28

## 2023-04-27 RX ADMIN — HEPARIN SODIUM 1400 UNIT(S)/HR: 5000 INJECTION INTRAVENOUS; SUBCUTANEOUS at 03:22

## 2023-04-27 RX ADMIN — ATORVASTATIN CALCIUM 40 MILLIGRAM(S): 80 TABLET, FILM COATED ORAL at 21:08

## 2023-04-27 RX ADMIN — Medication 3 MILLIGRAM(S): at 01:36

## 2023-04-27 RX ADMIN — POLYETHYLENE GLYCOL 3350 17 GRAM(S): 17 POWDER, FOR SOLUTION ORAL at 12:17

## 2023-04-27 RX ADMIN — HEPARIN SODIUM 1400 UNIT(S)/HR: 5000 INJECTION INTRAVENOUS; SUBCUTANEOUS at 04:35

## 2023-04-27 RX ADMIN — Medication 500 MILLIGRAM(S): at 22:05

## 2023-04-27 NOTE — DIETITIAN INITIAL EVALUATION ADULT - REASON INDICATOR FOR ASSESSMENT
Pt seen for RD consult for Pressure Injury Stage 2 or > and CHF STAR education.  Source: pt, pt's daughter at bedside, medical record. Pt noted as Latvian speaking, declined  service, daughter elected to translate PRN. Chart reviewed, events noted.

## 2023-04-27 NOTE — DIETITIAN INITIAL EVALUATION ADULT - NS FNS DIET ORDER
Diet, Consistent Carbohydrate/No Snacks:   DASH/TLC {Sodium & Cholesterol Restricted} (DASH) (04-24-23 @ 21:59) [Active]

## 2023-04-27 NOTE — DIETITIAN INITIAL EVALUATION ADULT - OTHER INFO
Daughter states pt's dry weight likely ~176 lbs. States pt's weight fluctuates a lot due to fluid shifts in setting of CHF, was even above 200 lbs due to fluid shifts.  Dosing wt: 186.3 lbs (04-24)  Wt history per chart: 179.6 lbs (04-27, standing), 183 lbs (03-22), 196 lbs (10/19/22), 193 lbs (09/22/22), 197 lbs (08/23/22), 195 lbs (04/07/22). RD to continue to monitor weight trends as able/available.     Per chart, pt currently ordered for lantus, admelog ISS, admelog before meals, bumex, magnesium sulfate, iron sucrose, ferrous sulfate, atorvastatin, and protonix in-house.

## 2023-04-27 NOTE — DIETITIAN INITIAL EVALUATION ADULT - PROBLEM SELECTOR PLAN 3
- ERCP was planned for outpatient 4/26  - Will touch base with GI about whether can be done inpatient

## 2023-04-27 NOTE — PROGRESS NOTE ADULT - PROBLEM SELECTOR PLAN 8
DVT: Per daughter, history of VTE, and history of afib, will hold Eliquis for possible procedure and start heparin drip  Dispo: PT rec outpatient PT  Diet: CC/DASH diet

## 2023-04-27 NOTE — CHART NOTE - NSCHARTNOTEFT_GEN_A_CORE
Brief GI F/u Note    Pt remains on O2  Will consider consider ERCP for stent removal/exchange once medically optimized (can also take place as an outpatient)      Thank you for involving us in the care of this patient, please reach out if any further questions.     Francisco Javier Perez MD  Gastroenterology/Hepatology Fellow, PGY6    Available on [employer] Teams  [phone] ([facility])  [phone] ([facility])  Please contact on call fellow weekdays after 5pm-7am and weekends: [phone]

## 2023-04-27 NOTE — PROGRESS NOTE ADULT - SUBJECTIVE AND OBJECTIVE BOX
DATE OF SERVICE: 04-27-23 @ 11:43    Patient is a 83y old  Female who presents with a chief complaint of Shortness of breath (27 Apr 2023 10:17)      INTERVAL HISTORY: Feels ok.     REVIEW OF SYSTEMS:  CONSTITUTIONAL: No weakness  EYES/ENT: No visual changes;  No throat pain   NECK: No pain or stiffness  RESPIRATORY: No cough, wheezing; No shortness of breath  CARDIOVASCULAR: No chest pain or palpitations  GASTROINTESTINAL: No abdominal  pain. No nausea, vomiting, or hematemesis  GENITOURINARY: No dysuria, frequency or hematuria  NEUROLOGICAL: No stroke like symptoms  SKIN: No rashes    TELEMETRY Personally reviewed: SR 60-80  	  MEDICATIONS:  buMETAnide 2 milliGRAM(s) Oral daily  metoprolol tartrate 100 milliGRAM(s) Oral every 12 hours        PHYSICAL EXAM:  T(C): 36.4 (04-27-23 @ 10:52), Max: 36.9 (04-26-23 @ 20:10)  HR: 65 (04-27-23 @ 10:52) (64 - 82)  BP: 112/68 (04-27-23 @ 10:52) (112/68 - 145/73)  RR: 18 (04-27-23 @ 10:52) (18 - 18)  SpO2: 100% (04-27-23 @ 10:52) (95% - 100%)  Wt(kg): --  I&O's Summary    26 Apr 2023 07:01  -  27 Apr 2023 07:00  --------------------------------------------------------  IN: 528 mL / OUT: 2250 mL / NET: -1722 mL    27 Apr 2023 07:01  -  27 Apr 2023 11:43  --------------------------------------------------------  IN: 120 mL / OUT: 0 mL / NET: 120 mL          Appearance: In no distress	  HEENT:    PERRL, EOMI	  Cardiovascular:  S1 S2, No JVD  Respiratory: diminished B/L with inspiratory wheeze  Gastrointestinal:  Soft, Non-tender, + BS	  Vascularature:  No edema of LE  Psychiatric: Appropriate affect   Neuro: no acute focal deficits                               9.8    8.08  )-----------( 355      ( 27 Apr 2023 11:04 )             34.1     04-27    141  |  99  |  27<H>  ----------------------------<  136<H>  4.0   |  34<H>  |  1.67<H>    Ca    9.3      27 Apr 2023 11:04  Phos  3.6     04-27  Mg     1.6     04-27    TPro  7.5  /  Alb  3.6  /  TBili  0.3  /  DBili  x   /  AST  19  /  ALT  23  /  AlkPhos  137<H>  04-27        Labs personally reviewed      ASSESSMENT/PLAN: 	      Ms. Batista is a 84 YO woman with PMH of HTN, HLD, DM2, HFpEF (EF 65%), CAD s/p CABG, Breast CA s/p R partial mastectomy, rectal CA s/p resection with recent admission for acute rissa s/p ERCP w/ stent c/b gallbladder perf w/ perc rissa and recurrent SVT, presenting for shortness of breath. Denies CP, palpitations, orthopnea or syncope. Follows as OP with Dr. Hagan.    Problem/Plan -1  Problem: Acute on Chronic Diastolic CHF  - POCUS suggestive of pulm edema  - CXR with no pulm edema or effusion. Prior CT one month ago with moderate effusions and pulm edema  - proBNP 2505  - Prior TTE 3/23 shows preserved EF, hyperdynamic LV function, basal inferoseptum hypokinesis, decreased RV systolic function severe pulm pressures, B/L pleural effusions  - c/w Bumex 2mg PO daily  - Strict I&Os, daily weights  - Wean supplemental oxygen as tolerated    Problem/Plan -2  Problem: CAD   - s/p CABG 2020  - ECG with no ischemia noted  - Trop 21  - c/w ASA, metoprolol and statin  - Formal TTE pending    Problem/Plan -3  Problem: hx of Recurrent SVT  - c/w Metoprolol 100mg PO BID  - cont to monitor on tele    Problem/Plan -4  Problem: Atrial Fibrillation  - Patient with n/o A-flutter on previous admission  - c/w Metoprolol   - eliquis 2.5mg PO BID on hold for planned ERCP  - c/w Heparin gtt        FABIOLA Smith-SEE Winchester DO [facility]  Cardiovascular Medicine  [address] [place] [address], [address]  Available through call or text on [employer] TEAMs  Office: [phone]   DATE OF SERVICE: 04-27-23 @ 11:43    Patient is a 83y old  Female who presents with a chief complaint of Shortness of breath (27 Apr 2023 10:17)      INTERVAL HISTORY: Feels ok.     REVIEW OF SYSTEMS:  CONSTITUTIONAL: No weakness  EYES/ENT: No visual changes;  No throat pain   NECK: No pain or stiffness  RESPIRATORY: No cough, wheezing; No shortness of breath  CARDIOVASCULAR: No chest pain or palpitations  GASTROINTESTINAL: No abdominal  pain. No nausea, vomiting, or hematemesis  GENITOURINARY: No dysuria, frequency or hematuria  NEUROLOGICAL: No stroke like symptoms  SKIN: No rashes    TELEMETRY Personally reviewed: SR 60-80  	  MEDICATIONS:  buMETAnide 2 milliGRAM(s) Oral daily  metoprolol tartrate 100 milliGRAM(s) Oral every 12 hours        PHYSICAL EXAM:  T(C): 36.4 (04-27-23 @ 10:52), Max: 36.9 (04-26-23 @ 20:10)  HR: 65 (04-27-23 @ 10:52) (64 - 82)  BP: 112/68 (04-27-23 @ 10:52) (112/68 - 145/73)  RR: 18 (04-27-23 @ 10:52) (18 - 18)  SpO2: 100% (04-27-23 @ 10:52) (95% - 100%)  Wt(kg): --  I&O's Summary    26 Apr 2023 07:01  -  27 Apr 2023 07:00  --------------------------------------------------------  IN: 528 mL / OUT: 2250 mL / NET: -1722 mL    27 Apr 2023 07:01  -  27 Apr 2023 11:43  --------------------------------------------------------  IN: 120 mL / OUT: 0 mL / NET: 120 mL          Appearance: In no distress	  HEENT:    PERRL, EOMI	  Cardiovascular:  S1 S2, No JVD  Respiratory: diminished B/L with inspiratory wheeze  Gastrointestinal:  Soft, Non-tender, + BS	  Vascularature:  No edema of LE  Psychiatric: Appropriate affect   Neuro: no acute focal deficits                               9.8    8.08  )-----------( 355      ( 27 Apr 2023 11:04 )             34.1     04-27    141  |  99  |  27<H>  ----------------------------<  136<H>  4.0   |  34<H>  |  1.67<H>    Ca    9.3      27 Apr 2023 11:04  Phos  3.6     04-27  Mg     1.6     04-27    TPro  7.5  /  Alb  3.6  /  TBili  0.3  /  DBili  x   /  AST  19  /  ALT  23  /  AlkPhos  137<H>  04-27        Labs personally reviewed      ASSESSMENT/PLAN: 	      Ms. Batista is a 82 YO woman with PMH of HTN, HLD, DM2, HFpEF (EF 65%), CAD s/p CABG, Breast CA s/p R partial mastectomy, rectal CA s/p resection with recent admission for acute rissa s/p ERCP w/ stent c/b gallbladder perf w/ perc rissa and recurrent SVT, presenting for shortness of breath. Denies CP, palpitations, orthopnea or syncope. Follows as OP with Dr. Hagan.    Problem/Plan -1  Problem: Acute on Chronic Diastolic CHF  - POCUS suggestive of pulm edema  - CXR with no pulm edema or effusion. Prior CT one month ago with moderate effusions and pulm edema  - proBNP 2505  - Prior TTE 3/23 shows preserved EF, hyperdynamic LV function, basal inferoseptum hypokinesis, decreased RV systolic function severe pulm pressures, B/L pleural effusions  - Increase Bumex to 2mg PO BID if ok with Renal   - Strict I&Os, daily weights  - Wean supplemental oxygen as tolerated    Problem/Plan -2  Problem: CAD   - s/p CABG 2020  - ECG with no ischemia noted  - Trop 21  - c/w ASA, metoprolol and statin  - Formal TTE pending    Problem/Plan -3  Problem: hx of Recurrent SVT  - c/w Metoprolol 100mg PO BID  - cont to monitor on tele    Problem/Plan -4  Problem: Atrial Fibrillation  - Patient with n/o A-flutter on previous admission  - c/w Metoprolol   - eliquis 2.5mg PO BID on hold for planned ERCP  - c/w Heparin gtt        MIKE Smith DO [facility]  Cardiovascular Medicine  [address] [place] [address], [address]  Available through call or text on [employer] TEAMs  Office: [phone]   DATE OF SERVICE: 04-27-23 @ 11:43    Patient is a 83y old  Female who presents with a chief complaint of Shortness of breath (27 Apr 2023 10:17)      INTERVAL HISTORY: Feels ok.     REVIEW OF SYSTEMS:  CONSTITUTIONAL: No weakness  EYES/ENT: No visual changes;  No throat pain   NECK: No pain or stiffness  RESPIRATORY: No cough, wheezing; No shortness of breath  CARDIOVASCULAR: No chest pain or palpitations  GASTROINTESTINAL: No abdominal  pain. No nausea, vomiting, or hematemesis  GENITOURINARY: No dysuria, frequency or hematuria  NEUROLOGICAL: No stroke like symptoms  SKIN: No rashes    TELEMETRY Personally reviewed: SR 60-80  	  MEDICATIONS:  buMETAnide 2 milliGRAM(s) Oral daily  metoprolol tartrate 100 milliGRAM(s) Oral every 12 hours        PHYSICAL EXAM:  T(C): 36.4 (04-27-23 @ 10:52), Max: 36.9 (04-26-23 @ 20:10)  HR: 65 (04-27-23 @ 10:52) (64 - 82)  BP: 112/68 (04-27-23 @ 10:52) (112/68 - 145/73)  RR: 18 (04-27-23 @ 10:52) (18 - 18)  SpO2: 100% (04-27-23 @ 10:52) (95% - 100%)  Wt(kg): --  I&O's Summary    26 Apr 2023 07:01  -  27 Apr 2023 07:00  --------------------------------------------------------  IN: 528 mL / OUT: 2250 mL / NET: -1722 mL    27 Apr 2023 07:01  -  27 Apr 2023 11:43  --------------------------------------------------------  IN: 120 mL / OUT: 0 mL / NET: 120 mL          Appearance: In no distress	  HEENT:    PERRL, EOMI	  Cardiovascular:  S1 S2, No JVD  Respiratory: diminished B/L with inspiratory wheeze  Gastrointestinal:  Soft, Non-tender, + BS	  Vascularature:  No edema of LE  Psychiatric: Appropriate affect   Neuro: no acute focal deficits                               9.8    8.08  )-----------( 355      ( 27 Apr 2023 11:04 )             34.1     04-27    141  |  99  |  27<H>  ----------------------------<  136<H>  4.0   |  34<H>  |  1.67<H>    Ca    9.3      27 Apr 2023 11:04  Phos  3.6     04-27  Mg     1.6     04-27    TPro  7.5  /  Alb  3.6  /  TBili  0.3  /  DBili  x   /  AST  19  /  ALT  23  /  AlkPhos  137<H>  04-27        Labs personally reviewed      ASSESSMENT/PLAN: 	      Ms. Batista is a 82 YO woman with PMH of HTN, HLD, DM2, HFpEF (EF 65%), CAD s/p CABG, Breast CA s/p R partial mastectomy, rectal CA s/p resection with recent admission for acute rissa s/p ERCP w/ stent c/b gallbladder perf w/ perc rissa and recurrent SVT, presenting for shortness of breath. Denies CP, palpitations, orthopnea or syncope. Follows as OP with Dr. Hagan.    Problem/Plan -1  Problem: Acute on Chronic Diastolic CHF  - POCUS suggestive of pulm edema  - CXR with no pulm edema or effusion. Prior CT one month ago with moderate effusions and pulm edema  - proBNP 2505  - Prior TTE 3/23 shows preserved EF, hyperdynamic LV function, basal inferoseptum hypokinesis, decreased RV systolic function severe pulm pressures, B/L pleural effusions  - c/w Bumex 2mg PO daily  - Strict I&Os, daily weights  - Wean supplemental oxygen as tolerated    Problem/Plan -2  Problem: CAD   - s/p CABG 2020  - ECG with no ischemia noted  - Trop 21  - c/w ASA, metoprolol and statin  - Formal TTE pending    Problem/Plan -3  Problem: hx of Recurrent SVT  - c/w Metoprolol 100mg PO BID  - cont to monitor on tele    Problem/Plan -4  Problem: Atrial Fibrillation  - Patient with n/o A-flutter on previous admission  - c/w Metoprolol   - eliquis 2.5mg PO BID on hold for planned ERCP  - c/w Heparin gtt        FABIOLA Smith-SEE Winchester DO [facility]  Cardiovascular Medicine  [address] [place] [address], [address]  Available through call or text on [employer] TEAMs  Office: [phone]   DATE OF SERVICE: 04-27-23 @ 11:43    Patient is a 83y old  Female who presents with a chief complaint of Shortness of breath (27 Apr 2023 10:17)      INTERVAL HISTORY: Feels ok.     REVIEW OF SYSTEMS:  CONSTITUTIONAL: No weakness  EYES/ENT: No visual changes;  No throat pain   NECK: No pain or stiffness  RESPIRATORY: No cough, wheezing; No shortness of breath  CARDIOVASCULAR: No chest pain or palpitations  GASTROINTESTINAL: No abdominal  pain. No nausea, vomiting, or hematemesis  GENITOURINARY: No dysuria, frequency or hematuria  NEUROLOGICAL: No stroke like symptoms  SKIN: No rashes    TELEMETRY Personally reviewed: SR 60-80  	  MEDICATIONS:  buMETAnide 2 milliGRAM(s) Oral daily  metoprolol tartrate 100 milliGRAM(s) Oral every 12 hours        PHYSICAL EXAM:  T(C): 36.4 (04-27-23 @ 10:52), Max: 36.9 (04-26-23 @ 20:10)  HR: 65 (04-27-23 @ 10:52) (64 - 82)  BP: 112/68 (04-27-23 @ 10:52) (112/68 - 145/73)  RR: 18 (04-27-23 @ 10:52) (18 - 18)  SpO2: 100% (04-27-23 @ 10:52) (95% - 100%)  Wt(kg): --  I&O's Summary    26 Apr 2023 07:01  -  27 Apr 2023 07:00  --------------------------------------------------------  IN: 528 mL / OUT: 2250 mL / NET: -1722 mL    27 Apr 2023 07:01  -  27 Apr 2023 11:43  --------------------------------------------------------  IN: 120 mL / OUT: 0 mL / NET: 120 mL          Appearance: In no distress	  HEENT:    PERRL, EOMI	  Cardiovascular:  S1 S2, No JVD  Respiratory: diminished B/L with inspiratory wheeze  Gastrointestinal:  Soft, Non-tender, + BS	  Vascularature:  No edema of LE  Psychiatric: Appropriate affect   Neuro: no acute focal deficits                               9.8    8.08  )-----------( 355      ( 27 Apr 2023 11:04 )             34.1     04-27    141  |  99  |  27<H>  ----------------------------<  136<H>  4.0   |  34<H>  |  1.67<H>    Ca    9.3      27 Apr 2023 11:04  Phos  3.6     04-27  Mg     1.6     04-27    TPro  7.5  /  Alb  3.6  /  TBili  0.3  /  DBili  x   /  AST  19  /  ALT  23  /  AlkPhos  137<H>  04-27        Labs personally reviewed          ASSESSMENT/PLAN: 	      Ms. Batista is a 84 YO woman with PMH of HTN, HLD, DM2, HFpEF (EF 65%), CAD s/p CABG, Breast CA s/p R partial mastectomy, rectal CA s/p resection with recent admission for acute rissa s/p ERCP w/ stent c/b gallbladder perf w/ perc rissa and recurrent SVT, presenting for shortness of breath. Denies CP, palpitations, orthopnea or syncope. Follows as OP with Dr. Hagan.    Problem/Plan -1  Problem: Acute on Chronic Diastolic CHF  - POCUS suggestive of pulm edema  - CXR with no pulm edema or effusion. Prior CT one month ago with moderate effusions and pulm edema  - proBNP 2505  - Prior TTE 3/23 shows preserved EF, hyperdynamic LV function, basal inferoseptum hypokinesis, decreased RV systolic function severe pulm pressures, B/L pleural effusions  - Give extra dose of Bumex 2mg PO tonight, will titrate further based on AM BMP   - Strict I&Os, daily weights  - Wean supplemental oxygen as tolerated    Problem/Plan -2  Problem: CAD   - s/p CABG 2020  - ECG with no ischemia noted  - Trop 21  - c/w ASA, metoprolol and statin  - Formal TTE pending    Problem/Plan -3  Problem: hx of Recurrent SVT  - c/w Metoprolol 100mg PO BID  - cont to monitor on tele    Problem/Plan -4  Problem: Atrial Fibrillation  - Patient with n/o A-flutter on previous admission  - c/w Metoprolol   - eliquis 2.5mg PO BID on hold for planned ERCP  - c/w Heparin gtt        FABIOLA Smith-NP   Sergey Winchester DO [facility]  Cardiovascular Medicine  [address] [place] [address], [address]  Available through call or text on [employer] TE[employer]  Office: [phone]

## 2023-04-27 NOTE — DIETITIAN INITIAL EVALUATION ADULT - ADD RECOMMEND
1) Continue DASH, Consistent Carbohydrate diet.   2) Recommend a Nephro-Tho supplement to aid in the prevention of micronutrient deficiencies, pending no medical contraindications.   3) Monitor PO intake, GI tolerance, skin integrity, labs, weight, and bowel movement regularity.   4) Honor food preferences as feasible. Assist with meals PRN and encourage PO intake.  5) RD remains available upon request and will follow-up per protocol.

## 2023-04-27 NOTE — CHART NOTE - NSCHARTNOTEFT_GEN_A_CORE
RD CHF Education note-    Patient was visited by RD for CHF education. Heart failure education provided to the patient and family in detail. Discussed heart failure nutrition therapy, sodium and fluid intake, importance of diet adherence, daily weights monitoring with the patient. Reinforced importance of weight gain parameters and importance of contacting MD’s about weight changes. Provided handouts on heart failure nutrition therapy, reading heart healthy nutrition labels, heart healthy shopping tips and low sodium food list. Patient's family verbalized understanding demonstrated by teach back method. RD contact information left with patient for any future questioning.     Raquel Acevedo RDN, CDN Pager #[phone]

## 2023-04-27 NOTE — PROGRESS NOTE ADULT - SUBJECTIVE AND OBJECTIVE BOX
Patient is a 83y old  Female who presents with a chief complaint of Shortness of breath (26 Apr 2023 18:37)      SUBJECTIVE / OVERNIGHT EVENTS:    MEDICATIONS  (STANDING):  aspirin  chewable 81 milliGRAM(s) Oral daily  atorvastatin 40 milliGRAM(s) Oral at bedtime  buMETAnide 2 milliGRAM(s) Oral daily  chlorhexidine 2% Cloths 1 Application(s) Topical <User Schedule>  dextrose 5%. 1000 milliLiter(s) (100 mL/Hr) IV Continuous <Continuous>  dextrose 5%. 1000 milliLiter(s) (50 mL/Hr) IV Continuous <Continuous>  dextrose 50% Injectable 25 Gram(s) IV Push once  dextrose 50% Injectable 12.5 Gram(s) IV Push once  dextrose 50% Injectable 25 Gram(s) IV Push once  ferrous    sulfate 325 milliGRAM(s) Oral daily  gabapentin 200 milliGRAM(s) Oral daily  glucagon  Injectable 1 milliGRAM(s) IntraMuscular once  heparin  Infusion.  Unit(s)/Hr (15 mL/Hr) IV Continuous <Continuous>  insulin glargine Injectable (LANTUS) 7 Unit(s) SubCutaneous at bedtime  insulin lispro (ADMELOG) corrective regimen sliding scale   SubCutaneous three times a day before meals  insulin lispro (ADMELOG) corrective regimen sliding scale   SubCutaneous at bedtime  insulin lispro Injectable (ADMELOG) 4 Unit(s) SubCutaneous three times a day before meals  iron sucrose IVPB 200 milliGRAM(s) IV Intermittent every 24 hours  metoprolol tartrate 100 milliGRAM(s) Oral every 12 hours  pantoprazole    Tablet 40 milliGRAM(s) Oral before breakfast  polyethylene glycol 3350 17 Gram(s) Oral daily  senna 2 Tablet(s) Oral at bedtime  sodium bicarbonate 650 milliGRAM(s) Oral daily    MEDICATIONS  (PRN):  acetaminophen     Tablet .. 650 milliGRAM(s) Oral every 6 hours PRN Temp greater or equal to 38C (100.4F), Mild Pain (1 - 3)  aluminum hydroxide/magnesium hydroxide/simethicone Suspension 30 milliLiter(s) Oral every 4 hours PRN Dyspepsia  dextrose Oral Gel 15 Gram(s) Oral once PRN Blood Glucose LESS THAN 70 milliGRAM(s)/deciliter  melatonin 3 milliGRAM(s) Oral at bedtime PRN Insomnia      CAPILLARY BLOOD GLUCOSE      POCT Blood Glucose.: 228 mg/dL (26 Apr 2023 21:26)  POCT Blood Glucose.: 155 mg/dL (26 Apr 2023 17:10)  POCT Blood Glucose.: 170 mg/dL (26 Apr 2023 11:16)  POCT Blood Glucose.: 187 mg/dL (26 Apr 2023 07:37)    I&O's Summary    26 Apr 2023 07:01  -  27 Apr 2023 07:00  --------------------------------------------------------  IN: 528 mL / OUT: 2250 mL / NET: -1722 mL        Vital Signs Last 24 Hrs  T(C): 36.7 (27 Apr 2023 04:27), Max: 36.9 (26 Apr 2023 20:10)  T(F): 98.1 (27 Apr 2023 04:27), Max: 98.5 (26 Apr 2023 20:10)  HR: 82 (27 Apr 2023 04:27) (64 - 82)  BP: 145/73 (27 Apr 2023 04:27) (138/81 - 145/79)  BP(mean): --  RR: 18 (27 Apr 2023 04:27) (18 - 18)  SpO2: 95% (27 Apr 2023 04:27) (95% - 98%)    Parameters below as of 27 Apr 2023 04:27  Patient On (Oxygen Delivery Method): nasal cannula  O2 Flow (L/min): 2      PHYSICAL EXAM:  GENERAL: Sitting in bed in no acute distress, wearing 2L NC  EYES: Conjunctiva noninjected or pale, sclera anicteric  HENT: NC/AT, moist mucous membranes  NECK: Supple, trachea midline  LUNG: Nonlabored respirations, no wheezes, rales  CV: RRR, Pulses- Radial: 2+ b/l.   ABDOMEN: Nondistended, nontender  MSK: No visible deformities, nontender extremities.  SKIN: No rashes, bruises  NEURO: AAOx4 (to person, place, time, event), no tremor    LABS:                        9.9    8.99  )-----------( 418      ( 26 Apr 2023 11:14 )             34.3      04-26    137  |  98  |  30<H>  ----------------------------<  183<H>  3.9   |  24  |  1.68<H>    Ca    9.1      26 Apr 2023 06:31  Phos  3.0     04-26  Mg     1.6     04-26      PTT - ( 27 Apr 2023 02:50 )  PTT:113.9 sec          RADIOLOGY & ADDITIONAL TESTS:    Imaging Personally Reviewed:    Consultant(s) Notes Reviewed:      Care Discussed with Consultants/Other Providers:   Patient is a 83y old  Female who presents with a chief complaint of Shortness of breath (26 Apr 2023 18:37)      SUBJECTIVE / OVERNIGHT EVENTS: No acute events overnight. Patient seen and examined at bedside, feels breathing is somewhat improved as is edema.     MEDICATIONS  (STANDING):  aspirin  chewable 81 milliGRAM(s) Oral daily  atorvastatin 40 milliGRAM(s) Oral at bedtime  buMETAnide 2 milliGRAM(s) Oral daily  chlorhexidine 2% Cloths 1 Application(s) Topical <User Schedule>  dextrose 5%. 1000 milliLiter(s) (100 mL/Hr) IV Continuous <Continuous>  dextrose 5%. 1000 milliLiter(s) (50 mL/Hr) IV Continuous <Continuous>  dextrose 50% Injectable 25 Gram(s) IV Push once  dextrose 50% Injectable 12.5 Gram(s) IV Push once  dextrose 50% Injectable 25 Gram(s) IV Push once  ferrous    sulfate 325 milliGRAM(s) Oral daily  gabapentin 200 milliGRAM(s) Oral daily  glucagon  Injectable 1 milliGRAM(s) IntraMuscular once  heparin  Infusion.  Unit(s)/Hr (15 mL/Hr) IV Continuous <Continuous>  insulin glargine Injectable (LANTUS) 7 Unit(s) SubCutaneous at bedtime  insulin lispro (ADMELOG) corrective regimen sliding scale   SubCutaneous three times a day before meals  insulin lispro (ADMELOG) corrective regimen sliding scale   SubCutaneous at bedtime  insulin lispro Injectable (ADMELOG) 4 Unit(s) SubCutaneous three times a day before meals  iron sucrose IVPB 200 milliGRAM(s) IV Intermittent every 24 hours  metoprolol tartrate 100 milliGRAM(s) Oral every 12 hours  pantoprazole    Tablet 40 milliGRAM(s) Oral before breakfast  polyethylene glycol 3350 17 Gram(s) Oral daily  senna 2 Tablet(s) Oral at bedtime  sodium bicarbonate 650 milliGRAM(s) Oral daily    MEDICATIONS  (PRN):  acetaminophen     Tablet .. 650 milliGRAM(s) Oral every 6 hours PRN Temp greater or equal to 38C (100.4F), Mild Pain (1 - 3)  aluminum hydroxide/magnesium hydroxide/simethicone Suspension 30 milliLiter(s) Oral every 4 hours PRN Dyspepsia  dextrose Oral Gel 15 Gram(s) Oral once PRN Blood Glucose LESS THAN 70 milliGRAM(s)/deciliter  melatonin 3 milliGRAM(s) Oral at bedtime PRN Insomnia      CAPILLARY BLOOD GLUCOSE      POCT Blood Glucose.: 228 mg/dL (26 Apr 2023 21:26)  POCT Blood Glucose.: 155 mg/dL (26 Apr 2023 17:10)  POCT Blood Glucose.: 170 mg/dL (26 Apr 2023 11:16)  POCT Blood Glucose.: 187 mg/dL (26 Apr 2023 07:37)    I&O's Summary    26 Apr 2023 07:01  -  27 Apr 2023 07:00  --------------------------------------------------------  IN: 528 mL / OUT: 2250 mL / NET: -1722 mL        Vital Signs Last 24 Hrs  T(C): 36.7 (27 Apr 2023 04:27), Max: 36.9 (26 Apr 2023 20:10)  T(F): 98.1 (27 Apr 2023 04:27), Max: 98.5 (26 Apr 2023 20:10)  HR: 82 (27 Apr 2023 04:27) (64 - 82)  BP: 145/73 (27 Apr 2023 04:27) (138/81 - 145/79)  BP(mean): --  RR: 18 (27 Apr 2023 04:27) (18 - 18)  SpO2: 95% (27 Apr 2023 04:27) (95% - 98%)    Parameters below as of 27 Apr 2023 04:27  Patient On (Oxygen Delivery Method): nasal cannula  O2 Flow (L/min): 2      PHYSICAL EXAM:  GENERAL: Sitting in bed in no acute distress, wearing 2L NC  EYES: Conjunctiva noninjected or pale, sclera anicteric  HENT: NC/AT, moist mucous membranes  NECK: Supple, trachea midline  LUNG: Nonlabored respirations, no wheezes, rales  CV: RRR, Pulses- Radial: 2+ b/l.   ABDOMEN: Nondistended, nontender  MSK: No visible deformities, nontender extremities. R arm with chronic lymphedema, no pitting edema noted in any other extremity.   SKIN: No rashes, bruises  NEURO: AAOx4 (to person, place, time, event), no tremor    LABS:                        9.9    8.99  )-----------( 418      ( 26 Apr 2023 11:14 )             34.3      04-26    137  |  98  |  30<H>  ----------------------------<  183<H>  3.9   |  24  |  1.68<H>    Ca    9.1      26 Apr 2023 06:31  Phos  3.0     04-26  Mg     1.6     04-26      PTT - ( 27 Apr 2023 02:50 )  PTT:113.9 sec          RADIOLOGY & ADDITIONAL TESTS:    Imaging Personally Reviewed:    Consultant(s) Notes Reviewed:      Care Discussed with Consultants/Other Providers:

## 2023-04-27 NOTE — DIETITIAN INITIAL EVALUATION ADULT - PERTINENT MEDS FT
MEDICATIONS  (STANDING):  aspirin  chewable 81 milliGRAM(s) Oral daily  atorvastatin 40 milliGRAM(s) Oral at bedtime  buMETAnide 2 milliGRAM(s) Oral two times a day  chlorhexidine 2% Cloths 1 Application(s) Topical <User Schedule>  dextrose 5%. 1000 milliLiter(s) (50 mL/Hr) IV Continuous <Continuous>  dextrose 5%. 1000 milliLiter(s) (100 mL/Hr) IV Continuous <Continuous>  dextrose 50% Injectable 25 Gram(s) IV Push once  dextrose 50% Injectable 25 Gram(s) IV Push once  dextrose 50% Injectable 12.5 Gram(s) IV Push once  ferrous    sulfate 325 milliGRAM(s) Oral daily  gabapentin 200 milliGRAM(s) Oral daily  glucagon  Injectable 1 milliGRAM(s) IntraMuscular once  heparin  Infusion. 1200 Unit(s)/Hr (12 mL/Hr) IV Continuous <Continuous>  insulin glargine Injectable (LANTUS) 7 Unit(s) SubCutaneous at bedtime  insulin lispro (ADMELOG) corrective regimen sliding scale   SubCutaneous three times a day before meals  insulin lispro (ADMELOG) corrective regimen sliding scale   SubCutaneous at bedtime  insulin lispro Injectable (ADMELOG) 4 Unit(s) SubCutaneous three times a day before meals  iron sucrose IVPB 200 milliGRAM(s) IV Intermittent every 24 hours  magnesium sulfate  IVPB 2 Gram(s) IV Intermittent once  metoprolol tartrate 100 milliGRAM(s) Oral every 12 hours  pantoprazole    Tablet 40 milliGRAM(s) Oral before breakfast  polyethylene glycol 3350 17 Gram(s) Oral daily  senna 2 Tablet(s) Oral at bedtime  sodium bicarbonate 650 milliGRAM(s) Oral daily    MEDICATIONS  (PRN):  acetaminophen     Tablet .. 650 milliGRAM(s) Oral every 6 hours PRN Temp greater or equal to 38C (100.4F), Mild Pain (1 - 3)  aluminum hydroxide/magnesium hydroxide/simethicone Suspension 30 milliLiter(s) Oral every 4 hours PRN Dyspepsia  dextrose Oral Gel 15 Gram(s) Oral once PRN Blood Glucose LESS THAN 70 milliGRAM(s)/deciliter  melatonin 3 milliGRAM(s) Oral at bedtime PRN Insomnia

## 2023-04-27 NOTE — PROGRESS NOTE ADULT - PROBLEM SELECTOR PLAN 4
Normocytic anemia - likely 2/2 CKD  - Iron studies consistent with MILLY - will start iron supplementation  - Per nephro, will start venofer for 5 days  - Bowel regimen Normocytic anemia - likely 2/2 CKD  - Iron studies consistent with MILLY - will start iron supplementation  - Per nephro, will continue venofer for 5 days  - Bowel regimen

## 2023-04-27 NOTE — PROGRESS NOTE ADULT - ATTENDING COMMENTS
82 YO woman with PMH of HTN, HLD, DM2, HFpEF (EF 65%), CAD s/p CABG, Breast CA s/p R partial mastectomy, rectal CA s/p resection with recent admission for acute rissa s/p ERCP w/ stent c/b gallbladder perf w/ perc rissa and recurrent SVT, presenting for shortness of breath,     #Acute on chronic CHF exacerbation  #Acute hypoxic respiratory failure   #H/o cholecystitis   #CKD  #DM  #Chronic Afib  #HTN  #Anemia    - presented due to SOB; s/p bumex 2mg IV in ED and placed on BiPAP; appears to be improving overall; no longer on BiPAP   - on bumex 2mg daily; nephro and cardio recs appreciated; will increase bumex to 2mg PO BID for now (patient is on 1mg daily at home)  - strict I/O; daily weight    - GI recs appreciated; original plan for ERCP 4/26 but postponed due to being on oxygen; wean off O2 as tolerated and f/u GI for procedure as inpatient when able to; patient was originally scheduled as outpatient 4/26  - hold eliquis for now for possible procedure; heparin drip for now.  - anemia panel noted; appears iron deficient; iron supplement added on; IV iron per nephro recs   - bowel regimen   - PT eval recs outpatient PT    - GOC discussion; DNR/DNI; DAVINA drafted and placed in chart     Rest as above. Discussed with HS.

## 2023-04-27 NOTE — DIETITIAN INITIAL EVALUATION ADULT - REASON
Nutrition-focused physical examination deferred at this time as pt with consistent adequate PO intake. No overt signs of fat/muscle wasting visually observed.

## 2023-04-27 NOTE — PROGRESS NOTE ADULT - ASSESSMENT
Ms. Batista is a 82 YO woman with PMH of HTN, HLD, DM2, HFpEF (EF 65%), CAD s/p CABG, Breast CA s/p R partial mastectomy, rectal CA s/p resection with recent admission for acute rissa s/p ERCP w/ stent c/b gallbladder perf w/ perc rissa and recurrent SVT, presenting for shortness of breath, POCUS of lungs c/w pulmonary edema, proBNP 2505.

## 2023-04-27 NOTE — DIETITIAN INITIAL EVALUATION ADULT - ENERGY INTAKE
Daughter reports pt is eating fair in-house, confirmed by flowsheets. States pt would not be interested in oral nutrition supplements.

## 2023-04-27 NOTE — PROGRESS NOTE ADULT - PROBLEM SELECTOR PLAN 2
- Currently on 3L NC  - Will attempt to wean to NC  - Not on home O2 - Currently on 2L NC  - Will attempt to wean to NC  - Not on home O2

## 2023-04-27 NOTE — DIETITIAN INITIAL EVALUATION ADULT - PERSON TAUGHT/METHOD
Discussed importance of adequate protein-energy consumption to meet estimated nutrient needs. Encouraged intake of meals as tolerated. Encouraged intake of protein-rich foods first at mealtimes to help preserve lean muscle mass. Reviewed food choices that are high in protein.     Heart failure education provided to the patient and family in detail. Discussed heart failure nutrition therapy, sodium and fluid intake, importance of diet adherence, daily weights monitoring with the patient. Reinforced importance of weight gain parameters and importance of contacting MD’s about weight changes. Provided handouts on heart failure nutrition therapy, reading heart healthy nutrition labels, heart healthy shopping tips and low sodium food list. Patient's family verbalized understanding demonstrated by teach back method. RD contact information left with patient for any future questioning.     Pt and family made aware RD remains available for further questions/concerns./verbal instruction/written material/teach back - (Patient repeats in own words)/patient instructed/daughter instructed

## 2023-04-27 NOTE — DIETITIAN INITIAL EVALUATION ADULT - REASON FOR ADMISSION
Heart failure    Per chart: Ms. Batista is a 84 YO woman with PMH of HTN, HLD, DM2, HFpEF (EF 65%), CAD s/p CABG, Breast CA s/p R partial mastectomy, rectal CA s/p resection with recent admission for acute rissa s/p ERCP w/ stent c/b gallbladder perf w/ perc rissa and recurrent SVT, presenting for shortness of breath, POCUS of lungs c/w pulmonary edema, proBNP 2505.

## 2023-04-27 NOTE — DIETITIAN INITIAL EVALUATION ADULT - NSFNSNUTRHOMESUPPLEMENTFT_GEN_A_CORE
Pt reports use of Vitamin B12 and Vitamin D3 supplements PTA. Denies use of any additional nutrition/dietary supplements PTA.

## 2023-04-27 NOTE — DIETITIAN INITIAL EVALUATION ADULT - NSFNSGIIOFT_GEN_A_CORE
Pt denies nausea, vomiting, constipation, diarrhea. Reports last BM 4/27. Pt currently on bowel regimen (miralax, senna).

## 2023-04-27 NOTE — DIETITIAN INITIAL EVALUATION ADULT - OTHER CALCULATIONS
Fluid needs deferred to team. Energy needs based on IBW +10% 110 lbs/49.8 kg, protein needs based on most recent weight: 179.6 lbs/81.4 kg (04-27, standing)

## 2023-04-27 NOTE — DIETITIAN INITIAL EVALUATION ADULT - PERTINENT LABORATORY DATA
04-27    141  |  99  |  27<H>  ----------------------------<  136<H>  4.0   |  34<H>  |  1.67<H>    Ca    9.3      27 Apr 2023 11:04  Phos  3.6     04-27  Mg     1.6     04-27    TPro  7.5  /  Alb  3.6  /  TBili  0.3  /  DBili  x   /  AST  19  /  ALT  23  /  AlkPhos  137<H>  04-27    CAPILLARY BLOOD GLUCOSE  POCT Blood Glucose.: 123 mg/dL (27 Apr 2023 11:27)  POCT Blood Glucose.: 169 mg/dL (27 Apr 2023 07:54)  POCT Blood Glucose.: 228 mg/dL (26 Apr 2023 21:26)  POCT Blood Glucose.: 155 mg/dL (26 Apr 2023 17:10)    A1C with Estimated Average Glucose Result: 7.8 % (04-20-23 @ 17:19)  A1C with Estimated Average Glucose Result: 7.8 % (03-23-23 @ 07:17)  A1C with Estimated Average Glucose Result: 9.6 % (01-19-23 @ 06:51)

## 2023-04-27 NOTE — PROGRESS NOTE ADULT - SUBJECTIVE AND OBJECTIVE BOX
Overnight events noted  seen and examined at  the bedside ,  no distress       VITAL:  T(C): , Max: 36.8 (04-25-23 @ 14:29)  T(F): , Max: 98.3 (04-25-23 @ 14:29)  HR: 76 (04-26-23 @ 04:49)  BP: 163/81 (04-26-23 @ 04:49)  BP(mean): --  RR: 19 (04-26-23 @ 05:12)  SpO2: 96% (04-26-23 @ 05:12)  Wt(kg): --      PHYSICAL EXAM:  General: Alerted, oriented  HEENT: MMM  Lungs:CTA-b/l  Heart: RRR S1S2  Abdomen: Soft, NTND  Ext: trace  edema b/l  : no chen      LABS:                        9.6    8.52  )-----------( 387      ( 25 Apr 2023 04:37 )             32.6     Na(137)/K(3.9)/Cl(98)/HCO3(24)/BUN(30)/Cr(1.68)Glu(183)/Ca(9.1)/Mg(1.6)/PO4(3.0)    04-26 @ 06:31  Na(139)/K(4.4)/Cl(99)/HCO3(30)/BUN(32)/Cr(1.86)Glu(201)/Ca(9.2)/Mg(1.6)/PO4(3.1)    04-25 @ 20:47  Na(139)/K(4.7)/Cl(103)/HCO3(25)/BUN(42)/Cr(1.74)Glu(258)/Ca(8.8)/Mg(--)/PO4(--)    04-24 @ 12:07    ASSESSMENT:  Ms. Batista is a 84 YO woman with PMH of HTN, HLD, DM2, HFpEF (EF 65%), CAD s/p CABG, Breast CA s/p R partial mastectomy, rectal CA s/p resection with previous admission for acute rissa s/p ERCP w/ stent c/b gallbladder perf w/ perc rissa and recurrent SVT, and recent admission for CHF exacerbation presenting for shortness of breathout patient nephrologist Dr Kan .    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,  nephrology aware , previous diuretic test showed minimal  function of that kidney)  CVS- Bp controlled at present  hypervolumic - improving  Electrolytes - controlled  Anemia- -ckd related  also consistent  iron deficiency , tsat 7, ferritin 28  GI--planning for EGD     RECOMMENDATIONS  1)Renal:  bmp in process  got an extra  bumex dose yesterday  on bumex 2 mg PO daily   keep k ~4, mag 2   avoid nephrotoxic medication  dose all medications for  GFR  30-35   ml/min  daily bmp , mag and Po4   renal diet   monitor I/Os  2)CVS: continue BP meds as ordered for now  3)start venofer 200 mg IVPB for 5 doses daily         [facility]  Office: [phone]       Overnight events noted  seen and examined at  the bedside ,  no distress       VITAL:  T(C): , Max: 36.8 (04-25-23 @ 14:29)  T(F): , Max: 98.3 (04-25-23 @ 14:29)  HR: 76 (04-26-23 @ 04:49)  BP: 163/81 (04-26-23 @ 04:49)  BP(mean): --  RR: 19 (04-26-23 @ 05:12)  SpO2: 96% (04-26-23 @ 05:12)  Wt(kg): --      PHYSICAL EXAM:  General: Alerted, oriented  HEENT: MMM  Lungs:CTA-b/l  Heart: RRR S1S2  Abdomen: Soft, NTND  Ext: trace  edema b/l  : no chen      LABS:                        9.6    8.52  )-----------( 387      ( 25 Apr 2023 04:37 )             32.6     Na(137)/K(3.9)/Cl(98)/HCO3(24)/BUN(30)/Cr(1.68)Glu(183)/Ca(9.1)/Mg(1.6)/PO4(3.0)    04-26 @ 06:31  Na(139)/K(4.4)/Cl(99)/HCO3(30)/BUN(32)/Cr(1.86)Glu(201)/Ca(9.2)/Mg(1.6)/PO4(3.1)    04-25 @ 20:47  Na(139)/K(4.7)/Cl(103)/HCO3(25)/BUN(42)/Cr(1.74)Glu(258)/Ca(8.8)/Mg(--)/PO4(--)    04-24 @ 12:07    ASSESSMENT:  Ms. Batista is a 82 YO woman with PMH of HTN, HLD, DM2, HFpEF (EF 65%), CAD s/p CABG, Breast CA s/p R partial mastectomy, rectal CA s/p resection with previous admission for acute rissa s/p ERCP w/ stent c/b gallbladder perf w/ perc rissa and recurrent SVT, and recent admission for CHF exacerbation presenting for shortness of breath.----out patient nephrologist Dr Kan .    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,  nephrology aware , previous diuretic test showed minimal  function of that kidney)  CVS- Bp controlled at present  hypervolumic - improving  Electrolytes - controlled  Anemia- -ckd related  also consistent  iron deficiency , tsat 7, ferritin 28  GI--planning for EGD     RECOMMENDATIONS  1)Renal:  bmp in process  got an extra  bumex dose yesterday  A_W increase  bumex 2 mg PO BID   keep k ~4, mag 2   avoid nephrotoxic medication  dose all medications for  GFR  30-35   ml/min  daily bmp , mag and Po4   renal diet   monitor I/Os  2)CVS: continue BP meds as ordered for now  3)start venofer 200 mg IVPB for 5 doses daily         [facility]  Office: [phone]

## 2023-04-27 NOTE — DIETITIAN INITIAL EVALUATION ADULT - NSFNSPHYEXAMSKINFT_GEN_A_CORE
Per flowsheets: sacrum- Suspected DTI   Per wound care note 4/26: "B/L buttocks/sacral deep red erythema, cannot rule out deep tissue injury, present on admission."

## 2023-04-27 NOTE — DIETITIAN INITIAL EVALUATION ADULT - NSFNSADHERENCEPTAFT_GEN_A_CORE
Pt noted with hx of DM2. Daughter reports pt was taking insulin PTA and was checking fingersticks >/= 1x/day with typical readings ~180-220 mg/dl. A1c 7.8% suggests fair glycemic control with consideration for age.

## 2023-04-28 ENCOUNTER — TRANSCRIPTION ENCOUNTER (OUTPATIENT)
Age: 83
End: 2023-04-28

## 2023-04-28 LAB
ANION GAP SERPL CALC-SCNC: 12 MMOL/L — SIGNIFICANT CHANGE UP (ref 5–17)
APTT BLD: 52.5 SEC — HIGH (ref 27.5–35.5)
APTT BLD: 69.8 SEC — HIGH (ref 27.5–35.5)
APTT BLD: 74.4 SEC — HIGH (ref 27.5–35.5)
APTT BLD: 78.8 SEC — HIGH (ref 27.5–35.5)
BUN SERPL-MCNC: 25 MG/DL — HIGH (ref 7–23)
CALCIUM SERPL-MCNC: 9.3 MG/DL — SIGNIFICANT CHANGE UP (ref 8.4–10.5)
CHLORIDE SERPL-SCNC: 98 MMOL/L — SIGNIFICANT CHANGE UP (ref 96–108)
CO2 SERPL-SCNC: 31 MMOL/L — SIGNIFICANT CHANGE UP (ref 22–31)
CREAT SERPL-MCNC: 1.71 MG/DL — HIGH (ref 0.5–1.3)
EGFR: 29 ML/MIN/1.73M2 — LOW
GLUCOSE BLDC GLUCOMTR-MCNC: 200 MG/DL — HIGH (ref 70–99)
GLUCOSE BLDC GLUCOMTR-MCNC: 218 MG/DL — HIGH (ref 70–99)
GLUCOSE BLDC GLUCOMTR-MCNC: 220 MG/DL — HIGH (ref 70–99)
GLUCOSE BLDC GLUCOMTR-MCNC: 238 MG/DL — HIGH (ref 70–99)
GLUCOSE SERPL-MCNC: 158 MG/DL — HIGH (ref 70–99)
HCT VFR BLD CALC: 32.8 % — LOW (ref 34.5–45)
HGB BLD-MCNC: 9.4 G/DL — LOW (ref 11.5–15.5)
MAGNESIUM SERPL-MCNC: 1.6 MG/DL — SIGNIFICANT CHANGE UP (ref 1.6–2.6)
MCHC RBC-ENTMCNC: 26.6 PG — LOW (ref 27–34)
MCHC RBC-ENTMCNC: 28.7 GM/DL — LOW (ref 32–36)
MCV RBC AUTO: 92.7 FL — SIGNIFICANT CHANGE UP (ref 80–100)
NRBC # BLD: 0 /100 WBCS — SIGNIFICANT CHANGE UP (ref 0–0)
PHOSPHATE SERPL-MCNC: 3.9 MG/DL — SIGNIFICANT CHANGE UP (ref 2.5–4.5)
PLATELET # BLD AUTO: 335 K/UL — SIGNIFICANT CHANGE UP (ref 150–400)
POTASSIUM SERPL-MCNC: 3.9 MMOL/L — SIGNIFICANT CHANGE UP (ref 3.5–5.3)
POTASSIUM SERPL-SCNC: 3.9 MMOL/L — SIGNIFICANT CHANGE UP (ref 3.5–5.3)
RBC # BLD: 3.54 M/UL — LOW (ref 3.8–5.2)
RBC # FLD: 15.4 % — HIGH (ref 10.3–14.5)
SODIUM SERPL-SCNC: 141 MMOL/L — SIGNIFICANT CHANGE UP (ref 135–145)
WBC # BLD: 8.26 K/UL — SIGNIFICANT CHANGE UP (ref 3.8–10.5)
WBC # FLD AUTO: 8.26 K/UL — SIGNIFICANT CHANGE UP (ref 3.8–10.5)

## 2023-04-28 PROCEDURE — 99232 SBSQ HOSP IP/OBS MODERATE 35: CPT | Mod: GC

## 2023-04-28 RX ORDER — BUMETANIDE 0.25 MG/ML
2 INJECTION INTRAMUSCULAR; INTRAVENOUS DAILY
Refills: 0 | Status: DISCONTINUED | OUTPATIENT
Start: 2023-04-28 | End: 2023-05-02

## 2023-04-28 RX ORDER — POTASSIUM CHLORIDE 20 MEQ
40 PACKET (EA) ORAL ONCE
Refills: 0 | Status: COMPLETED | OUTPATIENT
Start: 2023-04-28 | End: 2023-04-28

## 2023-04-28 RX ORDER — METHYLPREDNISOLONE 4 MG
500 TABLET ORAL ONCE
Refills: 0 | Status: COMPLETED | OUTPATIENT
Start: 2023-04-28 | End: 2023-04-28

## 2023-04-28 RX ORDER — IPRATROPIUM/ALBUTEROL SULFATE 18-103MCG
3 AEROSOL WITH ADAPTER (GRAM) INHALATION EVERY 6 HOURS
Refills: 0 | Status: DISCONTINUED | OUTPATIENT
Start: 2023-04-28 | End: 2023-05-17

## 2023-04-28 RX ADMIN — BUMETANIDE 2 MILLIGRAM(S): 0.25 INJECTION INTRAMUSCULAR; INTRAVENOUS at 15:07

## 2023-04-28 RX ADMIN — Medication 500 MILLIGRAM(S): at 17:15

## 2023-04-28 RX ADMIN — Medication 100 MILLIGRAM(S): at 17:16

## 2023-04-28 RX ADMIN — Medication 650 MILLIGRAM(S): at 11:27

## 2023-04-28 RX ADMIN — Medication 1: at 07:40

## 2023-04-28 RX ADMIN — GABAPENTIN 200 MILLIGRAM(S): 400 CAPSULE ORAL at 11:25

## 2023-04-28 RX ADMIN — CHLORHEXIDINE GLUCONATE 1 APPLICATION(S): 213 SOLUTION TOPICAL at 05:22

## 2023-04-28 RX ADMIN — BUMETANIDE 2 MILLIGRAM(S): 0.25 INJECTION INTRAMUSCULAR; INTRAVENOUS at 05:21

## 2023-04-28 RX ADMIN — INSULIN GLARGINE 7 UNIT(S): 100 INJECTION, SOLUTION SUBCUTANEOUS at 21:33

## 2023-04-28 RX ADMIN — MUPIROCIN 1 APPLICATION(S): 20 OINTMENT TOPICAL at 05:22

## 2023-04-28 RX ADMIN — ATORVASTATIN CALCIUM 40 MILLIGRAM(S): 80 TABLET, FILM COATED ORAL at 21:34

## 2023-04-28 RX ADMIN — Medication 2: at 17:14

## 2023-04-28 RX ADMIN — Medication 3 MILLILITER(S): at 11:25

## 2023-04-28 RX ADMIN — Medication 100 MILLIGRAM(S): at 05:21

## 2023-04-28 RX ADMIN — IRON SUCROSE 110 MILLIGRAM(S): 20 INJECTION, SOLUTION INTRAVENOUS at 14:42

## 2023-04-28 RX ADMIN — HEPARIN SODIUM 1200 UNIT(S)/HR: 5000 INJECTION INTRAVENOUS; SUBCUTANEOUS at 22:53

## 2023-04-28 RX ADMIN — Medication 2: at 11:26

## 2023-04-28 RX ADMIN — HEPARIN SODIUM 1000 UNIT(S)/HR: 5000 INJECTION INTRAVENOUS; SUBCUTANEOUS at 07:22

## 2023-04-28 RX ADMIN — Medication 40 MILLIEQUIVALENT(S): at 17:45

## 2023-04-28 RX ADMIN — HEPARIN SODIUM 1200 UNIT(S)/HR: 5000 INJECTION INTRAVENOUS; SUBCUTANEOUS at 08:14

## 2023-04-28 RX ADMIN — Medication 650 MILLIGRAM(S): at 12:11

## 2023-04-28 RX ADMIN — Medication 81 MILLIGRAM(S): at 11:25

## 2023-04-28 RX ADMIN — Medication 4 UNIT(S): at 17:14

## 2023-04-28 RX ADMIN — MUPIROCIN 1 APPLICATION(S): 20 OINTMENT TOPICAL at 17:17

## 2023-04-28 RX ADMIN — HEPARIN SODIUM 1000 UNIT(S)/HR: 5000 INJECTION INTRAVENOUS; SUBCUTANEOUS at 01:51

## 2023-04-28 RX ADMIN — Medication 325 MILLIGRAM(S): at 11:25

## 2023-04-28 RX ADMIN — Medication 3 MILLILITER(S): at 17:17

## 2023-04-28 RX ADMIN — HEPARIN SODIUM 1200 UNIT(S)/HR: 5000 INJECTION INTRAVENOUS; SUBCUTANEOUS at 15:06

## 2023-04-28 RX ADMIN — Medication 4 UNIT(S): at 07:40

## 2023-04-28 RX ADMIN — Medication 650 MILLIGRAM(S): at 11:24

## 2023-04-28 RX ADMIN — HEPARIN SODIUM 1000 UNIT(S)/HR: 5000 INJECTION INTRAVENOUS; SUBCUTANEOUS at 04:01

## 2023-04-28 RX ADMIN — PANTOPRAZOLE SODIUM 40 MILLIGRAM(S): 20 TABLET, DELAYED RELEASE ORAL at 05:21

## 2023-04-28 RX ADMIN — Medication 4 UNIT(S): at 11:25

## 2023-04-28 RX ADMIN — HEPARIN SODIUM 1200 UNIT(S)/HR: 5000 INJECTION INTRAVENOUS; SUBCUTANEOUS at 19:15

## 2023-04-28 RX ADMIN — POLYETHYLENE GLYCOL 3350 17 GRAM(S): 17 POWDER, FOR SOLUTION ORAL at 11:25

## 2023-04-28 RX ADMIN — Medication 3 MILLILITER(S): at 23:07

## 2023-04-28 RX ADMIN — Medication 1 TABLET(S): at 11:24

## 2023-04-28 NOTE — PROGRESS NOTE ADULT - PROBLEM SELECTOR PLAN 1
ProBNP 2505, TTE 3/23 with EF 76%, severe pulmonary hypertension, moderate diastolic dysfunction. POCUS done in ED cw pulmonary edema  - Diuresis with Bumex 2 mg BID  - Strict I&Os  - Daily weights ProBNP 2505, TTE 3/23 with EF 76%, severe pulmonary hypertension, moderate diastolic dysfunction. POCUS done in ED cw pulmonary edema  - Diuresis with Bumex 2 mg PO  - Strict I&Os  - Daily weights

## 2023-04-28 NOTE — PROGRESS NOTE ADULT - PROBLEM SELECTOR PLAN 2
- Currently on 2L NC  - Will attempt to wean to NC  - Not on home O2 - Currently on 2L NC  - Will attempt to wean to NC  - Not on home O2  - Duonebs q6 for wheezes

## 2023-04-28 NOTE — PROGRESS NOTE ADULT - SUBJECTIVE AND OBJECTIVE BOX
DATE OF SERVICE: 04-28-23 @ 15:06    Patient is a 83y old  Female who presents with a chief complaint of Shortness of breath (28 Apr 2023 07:59)      INTERVAL HISTORY: Feels ok.     REVIEW OF SYSTEMS:  CONSTITUTIONAL: No weakness  EYES/ENT: No visual changes;  No throat pain   NECK: No pain or stiffness  RESPIRATORY: No cough, wheezing; No shortness of breath  CARDIOVASCULAR: No chest pain or palpitations  GASTROINTESTINAL: No abdominal  pain. No nausea, vomiting, or hematemesis  GENITOURINARY: No dysuria, frequency or hematuria  NEUROLOGICAL: No stroke like symptoms  SKIN: No rashes    TELEMETRY Personally reviewed: SR 60-90; brief -160s  	  MEDICATIONS:  buMETAnide 2 milliGRAM(s) Oral two times a day  metoprolol tartrate 100 milliGRAM(s) Oral every 12 hours        PHYSICAL EXAM:  T(C): 36.8 (04-28-23 @ 10:38), Max: 36.8 (04-28-23 @ 10:38)  HR: 68 (04-28-23 @ 10:38) (63 - 68)  BP: 113/52 (04-28-23 @ 10:38) (104/64 - 133/78)  RR: 19 (04-28-23 @ 10:38) (18 - 20)  SpO2: 100% (04-28-23 @ 10:38) (88% - 100%)  Wt(kg): --  I&O's Summary    27 Apr 2023 07:01  -  28 Apr 2023 07:00  --------------------------------------------------------  IN: 540 mL / OUT: 2150 mL / NET: -1610 mL    28 Apr 2023 07:01  -  28 Apr 2023 15:06  --------------------------------------------------------  IN: 480 mL / OUT: 400 mL / NET: 80 mL          Appearance: In no distress	  HEENT:    PERRL, EOMI	  Cardiovascular:  S1 S2, No JVD  Respiratory: exp wheeze	  Gastrointestinal:  Soft, Non-tender, + BS	  Vascularature:  No edema of LE  Psychiatric: Appropriate affect   Neuro: no acute focal deficits                               9.4    8.26  )-----------( 335      ( 28 Apr 2023 06:53 )             32.8     04-28    141  |  98  |  25<H>  ----------------------------<  158<H>  3.9   |  31  |  1.71<H>    Ca    9.3      28 Apr 2023 06:51  Phos  3.9     04-28  Mg     1.6     04-28    TPro  7.5  /  Alb  3.6  /  TBili  0.3  /  DBili  x   /  AST  19  /  ALT  23  /  AlkPhos  137<H>  04-27        Labs personally reviewed      ASSESSMENT/PLAN: 	    Ms. Batista is a 84 YO woman with PMH of HTN, HLD, DM2, HFpEF (EF 65%), CAD s/p CABG, Breast CA s/p R partial mastectomy, rectal CA s/p resection with recent admission for acute rissa s/p ERCP w/ stent c/b gallbladder perf w/ perc rissa and recurrent SVT, presenting for shortness of breath. Denies CP, palpitations, orthopnea or syncope. Follows as OP with Dr. Hagan.    Problem/Plan -1  Problem: Acute on Chronic Diastolic CHF  - POCUS suggestive of pulm edema  - CXR with no pulm edema or effusion. Prior CT one month ago with moderate effusions and pulm edema  - proBNP 2505  - Prior TTE 3/23 shows preserved EF, hyperdynamic LV function, basal inferoseptum hypokinesis, decreased RV systolic function severe pulm pressures, B/L pleural effusions  - c/w Bumex 2mg PO Daily with close attention to Cr/BUN/Bicarb  - Strict I&Os, daily weights  - Wean supplemental oxygen as tolerated    Problem/Plan -2  Problem: CAD   - s/p CABG 2020  - ECG with no ischemia noted  - Trop 21  - c/w ASA, metoprolol and statin  - Formal TTE pending    Problem/Plan -3  Problem: hx of Recurrent SVT  - c/w Metoprolol 100mg PO BID  - cont to monitor on tele    Problem/Plan -4  Problem: Atrial Fibrillation  - Patient with n/o A-flutter on previous admission  - c/w Metoprolol   - eliquis 2.5mg PO BID on hold for planned ERCP  - c/w Heparin gtt        FABIOLA Smith-NP   Sergey Winchester DO [facility]  Cardiovascular Medicine  [address] [place] [address], [address]  Available through call or text on [employer]AM[employer]  Office: [phone]

## 2023-04-28 NOTE — PROGRESS NOTE ADULT - ATTENDING COMMENTS
82 YO woman with PMH of HTN, HLD, DM2, HFpEF (EF 65%), CAD s/p CABG, Breast CA s/p R partial mastectomy, rectal CA s/p resection with recent admission for acute rissa s/p ERCP w/ stent c/b gallbladder perf w/ perc rissa and recurrent SVT, presenting for shortness of breath,     #Acute on chronic CHF exacerbation  #Acute hypoxic respiratory failure   #H/o cholecystitis   #CKD  #DM  #Chronic Afib  #HTN  #Anemia    - presented due to SOB; s/p bumex 2mg IV in ED and placed on BiPAP; appears to be improving overall; no longer on BiPAP   - on bumex 2mg daily; nephro and cardio recs appreciated; will increase bumex to 2mg PO BID for now (patient is on 1mg daily at home)  - strict I/O; daily weight    - GI recs appreciated; original plan for ERCP 4/26 but postponed due to being on oxygen; wean off O2 as tolerated and f/u GI for procedure as inpatient when able to; patient was originally scheduled as outpatient 4/26  - hold eliquis for now for possible procedure; heparin drip for now.  - anemia panel noted; appears iron deficient; iron supplement added on; IV iron per nephro recs   - bowel regimen   - PT eval recs outpatient PT    - GOC discussion; DNR/DNI; DAVINA drafted and placed in chart     Rest as above. Discussed with HS. 82 YO woman with PMH of HTN, HLD, DM2, HFpEF (EF 65%), CAD s/p CABG, Breast CA s/p R partial mastectomy, rectal CA s/p resection with recent admission for acute rissa s/p ERCP w/ stent c/b gallbladder perf w/ perc rissa and recurrent SVT, presenting for shortness of breath,     #Acute on chronic CHF exacerbation  #Acute hypoxic respiratory failure   #H/o cholecystitis   #CKD  #DM  #Chronic Afib  #HTN  #Anemia    - presented due to SOB; s/p bumex 2mg IV in ED and placed on BiPAP; appears to be improving overall; no longer on BiPAP   - on bumex 2mg daily; nephro and cardio recs appreciated; will increase bumex to 2mg PO BID for now (patient is on 1mg daily at home)  - strict I/O; daily weight    - GI recs appreciated; original plan for ERCP 4/26 but postponed due to being on oxygen; wean off O2 as tolerated and f/u GI for procedure as inpatient when able to; patient was originally scheduled as outpatient 4/26  - hold eliquis for now for possible procedure; heparin drip for now.  - anemia panel noted; appears iron deficient; iron supplement added on; IV iron per nephro recs   - bowel regimen.  - PT eval recs outpatient PT    - GOC discussion; DNR/DNI; DAVINA drafted and placed in chart     Rest as above. Discussed with HS.

## 2023-04-28 NOTE — PROGRESS NOTE ADULT - SUBJECTIVE AND OBJECTIVE BOX
Overnight events noted  seen and examined at  the bedside ,  no distress       VITAL:  T(C): , Max: 36.8 (04-25-23 @ 14:29)  T(F): , Max: 98.3 (04-25-23 @ 14:29)  HR: 76 (04-26-23 @ 04:49)  BP: 163/81 (04-26-23 @ 04:49)  BP(mean): --  RR: 19 (04-26-23 @ 05:12)  SpO2: 96% (04-26-23 @ 05:12)  Wt(kg): --      PHYSICAL EXAM:  General: Alerted, oriented  HEENT: MMM  Lungs:CTA-b/l  Heart: RRR S1S2  Abdomen: Soft, NTND  Ext: trace  edema b/l  : no chen      LABS:                        9.6    8.52  )-----------( 387      ( 25 Apr 2023 04:37 )             32.6     Na(137)/K(3.9)/Cl(98)/HCO3(24)/BUN(30)/Cr(1.68)Glu(183)/Ca(9.1)/Mg(1.6)/PO4(3.0)    04-26 @ 06:31  Na(139)/K(4.4)/Cl(99)/HCO3(30)/BUN(32)/Cr(1.86)Glu(201)/Ca(9.2)/Mg(1.6)/PO4(3.1)    04-25 @ 20:47  Na(139)/K(4.7)/Cl(103)/HCO3(25)/BUN(42)/Cr(1.74)Glu(258)/Ca(8.8)/Mg(--)/PO4(--)    04-24 @ 12:07    ASSESSMENT:  Ms. Batista is a 84 YO woman with PMH of HTN, HLD, DM2, HFpEF (EF 65%), CAD s/p CABG, Breast CA s/p R partial mastectomy, rectal CA s/p resection with previous admission for acute rissa s/p ERCP w/ stent c/b gallbladder perf w/ perc rissa and recurrent SVT, and recent admission for CHF exacerbation presenting for shortness of breath.----out patient nephrologist Dr Kan .    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,  nephrology aware , previous diuretic test showed minimal  function of that kidney)  CVS- Bp controlled at present  hypervolumic - improving  Electrolytes - controlled  Anemia- -ckd related  also consistent  iron deficiency , tsat 7, ferritin 28  GI--planning for EGD     RECOMMENDATIONS  1)Renal:  bmp in process  got an extra  bumex dose yesterday  A_W increase  bumex 2 mg PO BID   keep k ~4, mag 2   avoid nephrotoxic medication  dose all medications for  GFR  30-35   ml/min  daily bmp , mag and Po4   renal diet   monitor I/Os  2)CVS: continue BP meds as ordered for now  3)start venofer 200 mg IVPB for 5 doses daily         [facility]  Office: [phone]       Overnight events noted  seen and examined at  the bedside ,  no distress       VITAL:  T(C): , Max: 36.8 (04-25-23 @ 14:29)  T(F): , Max: 98.3 (04-25-23 @ 14:29)  HR: 76 (04-26-23 @ 04:49)  BP: 163/81 (04-26-23 @ 04:49)  BP(mean): --  RR: 19 (04-26-23 @ 05:12)  SpO2: 96% (04-26-23 @ 05:12)  Wt(kg): --      PHYSICAL EXAM:  General: Alerted, oriented  HEENT: MMM  Lungs:CTA-b/l  Heart: RRR S1S2  Abdomen: Soft, NTND  Ext: trace  edema b/l  : no chen      LABS:                        9.6    8.52  )-----------( 387      ( 25 Apr 2023 04:37 )             32.6     Na(137)/K(3.9)/Cl(98)/HCO3(24)/BUN(30)/Cr(1.68)Glu(183)/Ca(9.1)/Mg(1.6)/PO4(3.0)    04-26 @ 06:31  Na(139)/K(4.4)/Cl(99)/HCO3(30)/BUN(32)/Cr(1.86)Glu(201)/Ca(9.2)/Mg(1.6)/PO4(3.1)    04-25 @ 20:47  Na(139)/K(4.7)/Cl(103)/HCO3(25)/BUN(42)/Cr(1.74)Glu(258)/Ca(8.8)/Mg(--)/PO4(--)    04-24 @ 12:07    ASSESSMENT:  Ms. Baitsta is a 82 YO woman with PMH of HTN, HLD, DM2, HFpEF (EF 65%), CAD s/p CABG, Breast CA s/p R partial mastectomy, rectal CA s/p resection with previous admission for acute rissa s/p ERCP w/ stent c/b gallbladder perf w/ perc rissa and recurrent SVT, and recent admission for CHF exacerbation presenting for shortness of breath.----out patient nephrologist Dr Kan .    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,  nephrology aware , previous diuretic test showed minimal  function of that kidney)  CVS- Bp controlled at present  hypervolumic - improving  Electrolytes - controlled  Anemia- -ckd related  also consistent  iron deficiency , tsat 7, ferritin 28  GI--planning for EGD     RECOMMENDATIONS  1)Renal:  renal fx stable   A_W increase  bumex 2 mg PO BID   keep k ~4, mag 2   avoid nephrotoxic medication  dose all medications for  GFR  30-35   ml/min  daily bmp , mag and Po4   renal diet   monitor I/Os  2)CVS: continue BP meds as ordered for now  3) venofer 200 mg IVPB for 5 doses daily         [facility]  Office: [phone]

## 2023-04-28 NOTE — PROGRESS NOTE ADULT - PROBLEM SELECTOR PLAN 4
Normocytic anemia - likely 2/2 CKD  - Iron studies consistent with MILLY - will start iron supplementation  - Per nephro, will continue venofer for 5 days  - Bowel regimen

## 2023-04-28 NOTE — CHART NOTE - NSCHARTNOTEFT_GEN_A_CORE
Brief GI F/u Note    Pt remains on O2  Will consider consider ERCP for stent removal/exchange once medically optimized (can also take place as an outpatient)  If weaned off O2 this weekend, can consider Monday --> would then make NPO after MN Sunday      Thank you for involving us in the care of this patient, please reach out if any further questions.     Francisco Javier Perez MD  Gastroenterology/Hepatology Fellow, PGY6    Available on [employer] Teams  [phone] ([facility])  [phone] ([facility])  Please contact on call fellow weekdays after 5pm-7am and weekends: [phone].

## 2023-04-28 NOTE — PROGRESS NOTE ADULT - SUBJECTIVE AND OBJECTIVE BOX
Patient is a 83y old  Female who presents with a chief complaint of Heart failure    Per chart: Ms. Batista is a 84 YO woman with PMH of HTN, HLD, DM2, HFpEF (EF 65%), CAD s/p CABG, Breast CA s/p R partial mastectomy, rectal CA s/p resection with recent admission for acute rissa s/p ERCP w/ stent c/b gallbladder perf w/ perc rissa and recurrent SVT, presenting for shortness of breath, POCUS of lungs c/w pulmonary edema, proBNP 2505.  (27 Apr 2023 14:33)      SUBJECTIVE / OVERNIGHT EVENTS:    MEDICATIONS  (STANDING):  aspirin  chewable 81 milliGRAM(s) Oral daily  atorvastatin 40 milliGRAM(s) Oral at bedtime  buMETAnide 2 milliGRAM(s) Oral two times a day  chlorhexidine 2% Cloths 1 Application(s) Topical <User Schedule>  dextrose 5%. 1000 milliLiter(s) (50 mL/Hr) IV Continuous <Continuous>  dextrose 5%. 1000 milliLiter(s) (100 mL/Hr) IV Continuous <Continuous>  dextrose 50% Injectable 25 Gram(s) IV Push once  dextrose 50% Injectable 25 Gram(s) IV Push once  dextrose 50% Injectable 12.5 Gram(s) IV Push once  ferrous    sulfate 325 milliGRAM(s) Oral daily  gabapentin 200 milliGRAM(s) Oral daily  glucagon  Injectable 1 milliGRAM(s) IntraMuscular once  heparin  Infusion. 1200 Unit(s)/Hr (12 mL/Hr) IV Continuous <Continuous>  insulin glargine Injectable (LANTUS) 7 Unit(s) SubCutaneous at bedtime  insulin lispro (ADMELOG) corrective regimen sliding scale   SubCutaneous three times a day before meals  insulin lispro (ADMELOG) corrective regimen sliding scale   SubCutaneous at bedtime  insulin lispro Injectable (ADMELOG) 4 Unit(s) SubCutaneous three times a day before meals  iron sucrose IVPB 200 milliGRAM(s) IV Intermittent every 24 hours  metoprolol tartrate 100 milliGRAM(s) Oral every 12 hours  mupirocin 2% Nasal 1 Application(s) Both Nostrils two times a day  Nephro-ashley 1 Tablet(s) Oral daily  pantoprazole    Tablet 40 milliGRAM(s) Oral before breakfast  polyethylene glycol 3350 17 Gram(s) Oral daily  senna 2 Tablet(s) Oral at bedtime  sodium bicarbonate 650 milliGRAM(s) Oral daily    MEDICATIONS  (PRN):  acetaminophen     Tablet .. 650 milliGRAM(s) Oral every 6 hours PRN Temp greater or equal to 38C (100.4F), Mild Pain (1 - 3)  dextrose Oral Gel 15 Gram(s) Oral once PRN Blood Glucose LESS THAN 70 milliGRAM(s)/deciliter  melatonin 3 milliGRAM(s) Oral at bedtime PRN Insomnia      CAPILLARY BLOOD GLUCOSE      POCT Blood Glucose.: 200 mg/dL (28 Apr 2023 07:28)  POCT Blood Glucose.: 195 mg/dL (27 Apr 2023 20:58)  POCT Blood Glucose.: 143 mg/dL (27 Apr 2023 17:00)  POCT Blood Glucose.: 123 mg/dL (27 Apr 2023 11:27)    I&O's Summary    27 Apr 2023 07:01  -  28 Apr 2023 07:00  --------------------------------------------------------  IN: 540 mL / OUT: 2150 mL / NET: -1610 mL        Vital Signs Last 24 Hrs  T(C): 36.4 (28 Apr 2023 04:15), Max: 36.6 (27 Apr 2023 20:04)  T(F): 97.6 (28 Apr 2023 04:15), Max: 97.8 (27 Apr 2023 20:04)  HR: 65 (28 Apr 2023 04:15) (63 - 78)  BP: 133/78 (28 Apr 2023 04:15) (104/64 - 146/67)  BP(mean): --  RR: 18 (28 Apr 2023 04:15) (18 - 18)  SpO2: 100% (28 Apr 2023 04:15) (96% - 100%)    Parameters below as of 28 Apr 2023 04:15  Patient On (Oxygen Delivery Method): nasal cannula  O2 Flow (L/min): 2      PHYSICAL EXAM:  GENERAL: Sitting in bed in no acute distress, wearing 2L NC  EYES: Conjunctiva noninjected or pale, sclera anicteric  HENT: NC/AT, moist mucous membranes  NECK: Supple, trachea midline  LUNG: Nonlabored respirations, no wheezes, rales  CV: RRR, Pulses- Radial: 2+ b/l.   ABDOMEN: Nondistended, nontender  MSK: No visible deformities, nontender extremities. R arm with chronic lymphedema, no pitting edema noted in any other extremity.   SKIN: No rashes, bruises  NEURO: AAOx4 (to person, place, time, event), no tremor    LABS:                        9.4    8.26  )-----------( 335      ( 28 Apr 2023 06:53 )             32.8      04-28    141  |  98  |  25<H>  ----------------------------<  158<H>  3.9   |  31  |  1.71<H>    Ca    9.3      28 Apr 2023 06:51  Phos  3.9     04-28  Mg     1.6     04-28    TPro  7.5  /  Alb  3.6  /  TBili  0.3  /  DBili  x   /  AST  19  /  ALT  23  /  AlkPhos  137<H>  04-27    PTT - ( 28 Apr 2023 01:27 )  PTT:69.8 sec          RADIOLOGY & ADDITIONAL TESTS:    Imaging Personally Reviewed:    Consultant(s) Notes Reviewed:      Care Discussed with Consultants/Other Providers:   Patient is a 83y old  Female who presents with a chief complaint of Heart failure    Per chart: Ms. Batista is a 84 YO woman with PMH of HTN, HLD, DM2, HFpEF (EF 65%), CAD s/p CABG, Breast CA s/p R partial mastectomy, rectal CA s/p resection with recent admission for acute rissa s/p ERCP w/ stent c/b gallbladder perf w/ perc rissa and recurrent SVT, presenting for shortness of breath, POCUS of lungs c/w pulmonary edema, proBNP 2505.  (27 Apr 2023 14:33)      SUBJECTIVE / OVERNIGHT EVENTS: No acute events overnight. Patient seen and examined at bedside, still with 2L NC. Endorses mild frontal headache, no congestion, mild non-productive cough.     MEDICATIONS  (STANDING):  aspirin  chewable 81 milliGRAM(s) Oral daily  atorvastatin 40 milliGRAM(s) Oral at bedtime  buMETAnide 2 milliGRAM(s) Oral two times a day  chlorhexidine 2% Cloths 1 Application(s) Topical <User Schedule>  dextrose 5%. 1000 milliLiter(s) (50 mL/Hr) IV Continuous <Continuous>  dextrose 5%. 1000 milliLiter(s) (100 mL/Hr) IV Continuous <Continuous>  dextrose 50% Injectable 25 Gram(s) IV Push once  dextrose 50% Injectable 25 Gram(s) IV Push once  dextrose 50% Injectable 12.5 Gram(s) IV Push once  ferrous    sulfate 325 milliGRAM(s) Oral daily  gabapentin 200 milliGRAM(s) Oral daily  glucagon  Injectable 1 milliGRAM(s) IntraMuscular once  heparin  Infusion. 1200 Unit(s)/Hr (12 mL/Hr) IV Continuous <Continuous>  insulin glargine Injectable (LANTUS) 7 Unit(s) SubCutaneous at bedtime  insulin lispro (ADMELOG) corrective regimen sliding scale   SubCutaneous three times a day before meals  insulin lispro (ADMELOG) corrective regimen sliding scale   SubCutaneous at bedtime  insulin lispro Injectable (ADMELOG) 4 Unit(s) SubCutaneous three times a day before meals  iron sucrose IVPB 200 milliGRAM(s) IV Intermittent every 24 hours  metoprolol tartrate 100 milliGRAM(s) Oral every 12 hours  mupirocin 2% Nasal 1 Application(s) Both Nostrils two times a day  Nephro-ashley 1 Tablet(s) Oral daily  pantoprazole    Tablet 40 milliGRAM(s) Oral before breakfast  polyethylene glycol 3350 17 Gram(s) Oral daily  senna 2 Tablet(s) Oral at bedtime  sodium bicarbonate 650 milliGRAM(s) Oral daily    MEDICATIONS  (PRN):  acetaminophen     Tablet .. 650 milliGRAM(s) Oral every 6 hours PRN Temp greater or equal to 38C (100.4F), Mild Pain (1 - 3)  dextrose Oral Gel 15 Gram(s) Oral once PRN Blood Glucose LESS THAN 70 milliGRAM(s)/deciliter  melatonin 3 milliGRAM(s) Oral at bedtime PRN Insomnia      CAPILLARY BLOOD GLUCOSE      POCT Blood Glucose.: 200 mg/dL (28 Apr 2023 07:28)  POCT Blood Glucose.: 195 mg/dL (27 Apr 2023 20:58)  POCT Blood Glucose.: 143 mg/dL (27 Apr 2023 17:00)  POCT Blood Glucose.: 123 mg/dL (27 Apr 2023 11:27)    I&O's Summary    27 Apr 2023 07:01  -  28 Apr 2023 07:00  --------------------------------------------------------  IN: 540 mL / OUT: 2150 mL / NET: -1610 mL        Vital Signs Last 24 Hrs  T(C): 36.4 (28 Apr 2023 04:15), Max: 36.6 (27 Apr 2023 20:04)  T(F): 97.6 (28 Apr 2023 04:15), Max: 97.8 (27 Apr 2023 20:04)  HR: 65 (28 Apr 2023 04:15) (63 - 78)  BP: 133/78 (28 Apr 2023 04:15) (104/64 - 146/67)  BP(mean): --  RR: 18 (28 Apr 2023 04:15) (18 - 18)  SpO2: 100% (28 Apr 2023 04:15) (96% - 100%)    Parameters below as of 28 Apr 2023 04:15  Patient On (Oxygen Delivery Method): nasal cannula  O2 Flow (L/min): 2      PHYSICAL EXAM:  GENERAL: Sitting in bed in no acute distress, wearing 2L NC  EYES: Conjunctiva noninjected or pale, sclera anicteric  HENT: NC/AT, moist mucous membranes  NECK: Supple, trachea midline  LUNG: DIffuse wheezes  CV: RRR, Pulses- Radial: 2+ b/l.   ABDOMEN: Nondistended, nontender  MSK: No visible deformities, nontender extremities. R arm with chronic lymphedema, no pitting edema noted in any other extremity.   SKIN: No rashes, bruises  NEURO: AAOx4 (to person, place, time, event), no tremor    LABS:                        9.4    8.26  )-----------( 335      ( 28 Apr 2023 06:53 )             32.8      04-28    141  |  98  |  25<H>  ----------------------------<  158<H>  3.9   |  31  |  1.71<H>    Ca    9.3      28 Apr 2023 06:51  Phos  3.9     04-28  Mg     1.6     04-28    TPro  7.5  /  Alb  3.6  /  TBili  0.3  /  DBili  x   /  AST  19  /  ALT  23  /  AlkPhos  137<H>  04-27    PTT - ( 28 Apr 2023 01:27 )  PTT:69.8 sec          RADIOLOGY & ADDITIONAL TESTS:    Imaging Personally Reviewed:    Consultant(s) Notes Reviewed:      Care Discussed with Consultants/Other Providers:

## 2023-04-29 LAB
ANION GAP SERPL CALC-SCNC: 9 MMOL/L — SIGNIFICANT CHANGE UP (ref 5–17)
APTT BLD: 86.2 SEC — HIGH (ref 27.5–35.5)
BUN SERPL-MCNC: 23 MG/DL — SIGNIFICANT CHANGE UP (ref 7–23)
CALCIUM SERPL-MCNC: 9.1 MG/DL — SIGNIFICANT CHANGE UP (ref 8.4–10.5)
CHLORIDE SERPL-SCNC: 97 MMOL/L — SIGNIFICANT CHANGE UP (ref 96–108)
CO2 SERPL-SCNC: 34 MMOL/L — HIGH (ref 22–31)
CREAT SERPL-MCNC: 1.67 MG/DL — HIGH (ref 0.5–1.3)
EGFR: 30 ML/MIN/1.73M2 — LOW
GLUCOSE BLDC GLUCOMTR-MCNC: 179 MG/DL — HIGH (ref 70–99)
GLUCOSE BLDC GLUCOMTR-MCNC: 188 MG/DL — HIGH (ref 70–99)
GLUCOSE BLDC GLUCOMTR-MCNC: 214 MG/DL — HIGH (ref 70–99)
GLUCOSE BLDC GLUCOMTR-MCNC: 245 MG/DL — HIGH (ref 70–99)
GLUCOSE SERPL-MCNC: 189 MG/DL — HIGH (ref 70–99)
HCT VFR BLD CALC: 30.2 % — LOW (ref 34.5–45)
HGB BLD-MCNC: 8.7 G/DL — LOW (ref 11.5–15.5)
MAGNESIUM SERPL-MCNC: 1.6 MG/DL — SIGNIFICANT CHANGE UP (ref 1.6–2.6)
MCHC RBC-ENTMCNC: 26.6 PG — LOW (ref 27–34)
MCHC RBC-ENTMCNC: 28.8 GM/DL — LOW (ref 32–36)
MCV RBC AUTO: 92.4 FL — SIGNIFICANT CHANGE UP (ref 80–100)
NRBC # BLD: 0 /100 WBCS — SIGNIFICANT CHANGE UP (ref 0–0)
PHOSPHATE SERPL-MCNC: 3 MG/DL — SIGNIFICANT CHANGE UP (ref 2.5–4.5)
PLATELET # BLD AUTO: 323 K/UL — SIGNIFICANT CHANGE UP (ref 150–400)
POTASSIUM SERPL-MCNC: 4.1 MMOL/L — SIGNIFICANT CHANGE UP (ref 3.5–5.3)
POTASSIUM SERPL-SCNC: 4.1 MMOL/L — SIGNIFICANT CHANGE UP (ref 3.5–5.3)
RBC # BLD: 3.27 M/UL — LOW (ref 3.8–5.2)
RBC # FLD: 15.4 % — HIGH (ref 10.3–14.5)
SODIUM SERPL-SCNC: 140 MMOL/L — SIGNIFICANT CHANGE UP (ref 135–145)
WBC # BLD: 8.35 K/UL — SIGNIFICANT CHANGE UP (ref 3.8–10.5)
WBC # FLD AUTO: 8.35 K/UL — SIGNIFICANT CHANGE UP (ref 3.8–10.5)

## 2023-04-29 PROCEDURE — 99233 SBSQ HOSP IP/OBS HIGH 50: CPT | Mod: GC

## 2023-04-29 RX ORDER — METHYLPREDNISOLONE 4 MG
500 TABLET ORAL ONCE
Refills: 0 | Status: COMPLETED | OUTPATIENT
Start: 2023-04-29 | End: 2023-04-29

## 2023-04-29 RX ADMIN — Medication 650 MILLIGRAM(S): at 10:37

## 2023-04-29 RX ADMIN — ATORVASTATIN CALCIUM 40 MILLIGRAM(S): 80 TABLET, FILM COATED ORAL at 21:44

## 2023-04-29 RX ADMIN — Medication 650 MILLIGRAM(S): at 01:14

## 2023-04-29 RX ADMIN — Medication 1: at 17:34

## 2023-04-29 RX ADMIN — Medication 650 MILLIGRAM(S): at 11:05

## 2023-04-29 RX ADMIN — HEPARIN SODIUM 1200 UNIT(S)/HR: 5000 INJECTION INTRAVENOUS; SUBCUTANEOUS at 00:40

## 2023-04-29 RX ADMIN — Medication 3 MILLILITER(S): at 17:35

## 2023-04-29 RX ADMIN — Medication 4 UNIT(S): at 12:20

## 2023-04-29 RX ADMIN — HEPARIN SODIUM 1200 UNIT(S)/HR: 5000 INJECTION INTRAVENOUS; SUBCUTANEOUS at 21:45

## 2023-04-29 RX ADMIN — MUPIROCIN 1 APPLICATION(S): 20 OINTMENT TOPICAL at 17:35

## 2023-04-29 RX ADMIN — Medication 3 MILLILITER(S): at 11:05

## 2023-04-29 RX ADMIN — INSULIN GLARGINE 7 UNIT(S): 100 INJECTION, SOLUTION SUBCUTANEOUS at 21:44

## 2023-04-29 RX ADMIN — Medication 4 UNIT(S): at 17:34

## 2023-04-29 RX ADMIN — Medication 2: at 12:20

## 2023-04-29 RX ADMIN — GABAPENTIN 200 MILLIGRAM(S): 400 CAPSULE ORAL at 11:05

## 2023-04-29 RX ADMIN — MUPIROCIN 1 APPLICATION(S): 20 OINTMENT TOPICAL at 05:11

## 2023-04-29 RX ADMIN — Medication 81 MILLIGRAM(S): at 11:07

## 2023-04-29 RX ADMIN — IRON SUCROSE 110 MILLIGRAM(S): 20 INJECTION, SOLUTION INTRAVENOUS at 12:57

## 2023-04-29 RX ADMIN — Medication 3 MILLILITER(S): at 05:11

## 2023-04-29 RX ADMIN — CHLORHEXIDINE GLUCONATE 1 APPLICATION(S): 213 SOLUTION TOPICAL at 05:11

## 2023-04-29 RX ADMIN — BUMETANIDE 2 MILLIGRAM(S): 0.25 INJECTION INTRAMUSCULAR; INTRAVENOUS at 05:11

## 2023-04-29 RX ADMIN — Medication 1: at 08:20

## 2023-04-29 RX ADMIN — Medication 4 UNIT(S): at 08:20

## 2023-04-29 RX ADMIN — Medication 100 MILLIGRAM(S): at 05:11

## 2023-04-29 RX ADMIN — Medication 1 TABLET(S): at 11:05

## 2023-04-29 RX ADMIN — PANTOPRAZOLE SODIUM 40 MILLIGRAM(S): 20 TABLET, DELAYED RELEASE ORAL at 05:11

## 2023-04-29 RX ADMIN — Medication 100 MILLIGRAM(S): at 17:35

## 2023-04-29 RX ADMIN — Medication 500 MILLIGRAM(S): at 10:37

## 2023-04-29 RX ADMIN — HEPARIN SODIUM 1200 UNIT(S)/HR: 5000 INJECTION INTRAVENOUS; SUBCUTANEOUS at 06:39

## 2023-04-29 RX ADMIN — Medication 325 MILLIGRAM(S): at 11:05

## 2023-04-29 NOTE — PROGRESS NOTE ADULT - SUBJECTIVE AND OBJECTIVE BOX
Overnight events noted  seen and examined at  the bedside ,  no distress       VITAL:  T(C): , Max: 36.8 (04-25-23 @ 14:29)  T(F): , Max: 98.3 (04-25-23 @ 14:29)  HR: 76 (04-26-23 @ 04:49)  BP: 163/81 (04-26-23 @ 04:49)  BP(mean): --  RR: 19 (04-26-23 @ 05:12)  SpO2: 96% (04-26-23 @ 05:12)  Wt(kg): --      PHYSICAL EXAM:  General: Alerted, oriented  HEENT: MMM  Lungs:CTA-b/l  Heart: RRR S1S2  Abdomen: Soft, NTND  Ext: trace  edema b/l  : no chen      LABS:                        9.6    8.52  )-----------( 387      ( 25 Apr 2023 04:37 )             32.6     Na(137)/K(3.9)/Cl(98)/HCO3(24)/BUN(30)/Cr(1.68)Glu(183)/Ca(9.1)/Mg(1.6)/PO4(3.0)    04-26 @ 06:31  Na(139)/K(4.4)/Cl(99)/HCO3(30)/BUN(32)/Cr(1.86)Glu(201)/Ca(9.2)/Mg(1.6)/PO4(3.1)    04-25 @ 20:47  Na(139)/K(4.7)/Cl(103)/HCO3(25)/BUN(42)/Cr(1.74)Glu(258)/Ca(8.8)/Mg(--)/PO4(--)    04-24 @ 12:07    ASSESSMENT:  Ms. Batista is a 82 YO woman with PMH of HTN, HLD, DM2, HFpEF (EF 65%), CAD s/p CABG, Breast CA s/p R partial mastectomy, rectal CA s/p resection with previous admission for acute rissa s/p ERCP w/ stent c/b gallbladder perf w/ perc rissa and recurrent SVT, and recent admission for CHF exacerbation presenting for shortness of breath.----out patient nephrologist Dr Kan .    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,  nephrology aware , previous diuretic test showed minimal  function of that kidney)  CVS- Bp controlled at present  hypervolumic - improving  Electrolytes - controlled  Anemia- -ckd related  also consistent  iron deficiency , tsat 7, ferritin 28  GI--planning for EGD   met alkalosis --- in the view of diuretic --- cont to monitor     RECOMMENDATIONS  1)Renal:  renal fx stable   cont  bumex 2 mg PO BID   keep k ~4, mag 2   avoid nephrotoxic medication  dose all medications for  GFR  30-35   ml/min  daily bmp , mag and Po4   renal diet   monitor I/Os  2)CVS: continue BP meds as ordered for now  3) Venofer 200 mg IVPB for 5 doses daily         [facility]  Office: [phone]

## 2023-04-29 NOTE — PROGRESS NOTE ADULT - PROBLEM SELECTOR PLAN 2
- Currently on 2L NC  - Will attempt to wean to NC  - Not on home O2  - Duonebs q6 for wheezes - Currently on 2L NC  - Will attempt to wean to NC  - Not on home O2  - Duonebs q6 for wheezes  - Incentive spirometry

## 2023-04-29 NOTE — PROGRESS NOTE ADULT - PROBLEM SELECTOR PLAN 1
ProBNP 2505, TTE 3/23 with EF 76%, severe pulmonary hypertension, moderate diastolic dysfunction. POCUS done in ED cw pulmonary edema  - Diuresis with Bumex 2 mg PO  - Strict I&Os  - Daily weights

## 2023-04-29 NOTE — PROGRESS NOTE ADULT - SUBJECTIVE AND OBJECTIVE BOX
DATE OF SERVICE: 04-29-23 @ 15:15    Patient is a 83y old  Female who presents with a chief complaint of Shortness of breath (29 Apr 2023 09:01)      INTERVAL HISTORY: no complaints     REVIEW OF SYSTEMS:  CONSTITUTIONAL: No weakness  EYES/ENT: No visual changes;  No throat pain   NECK: No pain or stiffness  RESPIRATORY: No cough, wheezing; No shortness of breath  CARDIOVASCULAR: No chest pain or palpitations  GASTROINTESTINAL: No abdominal  pain. No nausea, vomiting, or hematemesis  GENITOURINARY: No dysuria, frequency or hematuria  NEUROLOGICAL: No stroke like symptoms  SKIN: No rashes      	  MEDICATIONS:  buMETAnide 2 milliGRAM(s) Oral daily  metoprolol tartrate 100 milliGRAM(s) Oral every 12 hours        PHYSICAL EXAM:  T(C): 36.7 (04-29-23 @ 12:18), Max: 36.8 (04-28-23 @ 20:06)  HR: 84 (04-29-23 @ 12:18) (68 - 84)  BP: 127/73 (04-29-23 @ 12:18) (114/60 - 147/81)  RR: 18 (04-29-23 @ 12:18) (18 - 18)  SpO2: 97% (04-29-23 @ 12:18) (90% - 100%)  Wt(kg): --  I&O's Summary    28 Apr 2023 07:01  -  29 Apr 2023 07:00  --------------------------------------------------------  IN: 680 mL / OUT: 1500 mL / NET: -820 mL    29 Apr 2023 07:01  -  29 Apr 2023 15:15  --------------------------------------------------------  IN: 630 mL / OUT: 0 mL / NET: 630 mL          Appearance: In no distress	  HEENT:    PERRL, EOMI	  Cardiovascular:  S1 S2, No JVD  Respiratory: Lungs clear to auscultation	  Gastrointestinal:  Soft, Non-tender, + BS	  Vascularature:  No edema of LE  Psychiatric: Appropriate affect   Neuro: no acute focal deficits                         8.7    8.35  )-----------( 323      ( 29 Apr 2023 06:12 )             30.2     04-29    140  |  97  |  23  ----------------------------<  189<H>  4.1   |  34<H>  |  1.67<H>    Ca    9.1      29 Apr 2023 06:11  Phos  3.0     04-29  Mg     1.6     04-29          Labs personally reviewed      ASSESSMENT/PLAN: 	    Ms. Batista is a 84 YO woman with PMH of HTN, HLD, DM2, HFpEF (EF 65%), CAD s/p CABG, Breast CA s/p R partial mastectomy, rectal CA s/p resection with recent admission for acute rissa s/p ERCP w/ stent c/b gallbladder perf w/ perc rissa and recurrent SVT, presenting for shortness of breath. Denies CP, palpitations, orthopnea or syncope. Follows as OP with Dr. Hagan.    Problem/Plan -1  Problem: Acute on Chronic Diastolic CHF  - POCUS suggestive of pulm edema  - CXR with no pulm edema or effusion. Prior CT one month ago with moderate effusions and pulm edema  - proBNP 2505  - Prior TTE 3/23 shows preserved EF, hyperdynamic LV function, basal inferoseptum hypokinesis, decreased RV systolic function severe pulm pressures, B/L pleural effusions  - c/w Bumex 2mg PO Daily with close attention to Cr/BUN/Bicarb  - Strict I&Os, daily weights  - Wean supplemental oxygen as tolerated    Problem/Plan -2  Problem: CAD   - s/p CABG 2020  - ECG with no ischemia noted  - Trop 21  - c/w ASA, metoprolol and statin  - Formal TTE pending    Problem/Plan -3  Problem: hx of Recurrent SVT  - c/w Metoprolol 100mg PO BID  - cont to monitor on tele  --150 on tele for 6 secs. Now rate controlled. C/w current BB    Problem/Plan -4  Problem: Atrial Fibrillation  - Patient with n/o A-flutter on previous admission  - c/w Metoprolol   - eliquis 2.5mg PO BID on hold for planned ERCP  - c/w Heparin gtt                MAINE Mancia DO [facility]  Cardiovascular Medicine  [address] [place] [address], [address]  Office: [phone]  Available via call/text on [employer] Teams  DATE OF SERVICE: 04-29-23 @ 15:15    Patient is a 83y old  Female who presents with a chief complaint of Shortness of breath (29 Apr 2023 09:01)      INTERVAL HISTORY: no complaints     REVIEW OF SYSTEMS:  CONSTITUTIONAL: No weakness  EYES/ENT: No visual changes;  No throat pain   NECK: No pain or stiffness  RESPIRATORY: No cough, wheezing; No shortness of breath  CARDIOVASCULAR: No chest pain or palpitations  GASTROINTESTINAL: No abdominal  pain. No nausea, vomiting, or hematemesis  GENITOURINARY: No dysuria, frequency or hematuria  NEUROLOGICAL: No stroke like symptoms  SKIN: No rashes      	  MEDICATIONS:  buMETAnide 2 milliGRAM(s) Oral daily  metoprolol tartrate 100 milliGRAM(s) Oral every 12 hours        PHYSICAL EXAM:  T(C): 36.7 (04-29-23 @ 12:18), Max: 36.8 (04-28-23 @ 20:06)  HR: 84 (04-29-23 @ 12:18) (68 - 84)  BP: 127/73 (04-29-23 @ 12:18) (114/60 - 147/81)  RR: 18 (04-29-23 @ 12:18) (18 - 18)  SpO2: 97% (04-29-23 @ 12:18) (90% - 100%)  Wt(kg): --  I&O's Summary    28 Apr 2023 07:01  -  29 Apr 2023 07:00  --------------------------------------------------------  IN: 680 mL / OUT: 1500 mL / NET: -820 mL    29 Apr 2023 07:01  -  29 Apr 2023 15:15  --------------------------------------------------------  IN: 630 mL / OUT: 0 mL / NET: 630 mL          Appearance: In no distress	  HEENT:    PERRL, EOMI	  Cardiovascular:  S1 S2, No JVD  Respiratory: Lungs clear to auscultation	  Gastrointestinal:  Soft, Non-tender, + BS	  Vascularature:  No edema of LE  Psychiatric: Appropriate affect   Neuro: no acute focal deficits                         8.7    8.35  )-----------( 323      ( 29 Apr 2023 06:12 )             30.2     04-29    140  |  97  |  23  ----------------------------<  189<H>  4.1   |  34<H>  |  1.67<H>    Ca    9.1      29 Apr 2023 06:11  Phos  3.0     04-29  Mg     1.6     04-29          Labs personally reviewed      ASSESSMENT/PLAN: 	    Ms. Batista is a 84 YO woman with PMH of HTN, HLD, DM2, HFpEF (EF 65%), CAD s/p CABG, Breast CA s/p R partial mastectomy, rectal CA s/p resection with recent admission for acute rissa s/p ERCP w/ stent c/b gallbladder perf w/ perc rissa and recurrent SVT, presenting for shortness of breath. Denies CP, palpitations, orthopnea or syncope. Follows as OP with Dr. Hagan.    Problem/Plan -1  Problem: Acute on Chronic Diastolic CHF  - POCUS suggestive of pulm edema  - CXR with no pulm edema or effusion. Prior CT one month ago with moderate effusions and pulm edema  - proBNP 2505  - Prior TTE 3/23 shows preserved EF, hyperdynamic LV function, basal inferoseptum hypokinesis, decreased RV systolic function severe pulm pressures, B/L pleural effusions  - c/w Bumex 2mg PO Daily with close attention to Cr/BUN/Bicarb  - Strict I&Os, daily weights  - Wean supplemental oxygen as tolerated    Problem/Plan -2  Problem: CAD   - s/p CABG 2020  - ECG with no ischemia noted  - Trop 21  - c/w ASA, metoprolol and statin  - Prior TTE 3/23 shows preserved EF, hyperdynamic LV function, basal inferoseptal hypokinesis, decreased RV systolic function severe pulm pressures     Problem/Plan -3  Problem: hx of Recurrent SVT  - c/w Metoprolol 100mg PO BID  - cont to monitor on tele  --150 on tele for 6 secs. Now rate controlled. C/w current BB    Problem/Plan -4  Problem: Atrial Fibrillation  - Patient with n/o A-flutter on previous admission  - c/w Metoprolol   - eliquis 2.5mg PO BID on hold for planned ERCP  - c/w Heparin gtt                MAINE Mancia DO [facility]  Cardiovascular Medicine  [address] [place] [address], [address]  Office: [phone]  Available via call/text on [employer] Teams  DATE OF SERVICE: 04-29-23 @ 15:15    Patient is a 83y old  Female who presents with a chief complaint of Shortness of breath (29 Apr 2023 09:01)      INTERVAL HISTORY: no complaints     REVIEW OF SYSTEMS:  CONSTITUTIONAL: No weakness  EYES/ENT: No visual changes;  No throat pain   NECK: No pain or stiffness  RESPIRATORY: No cough, wheezing; No shortness of breath  CARDIOVASCULAR: No chest pain or palpitations  GASTROINTESTINAL: No abdominal  pain. No nausea, vomiting, or hematemesis  GENITOURINARY: No dysuria, frequency or hematuria  NEUROLOGICAL: No stroke like symptoms  SKIN: No rashes      	  MEDICATIONS:  buMETAnide 2 milliGRAM(s) Oral daily  metoprolol tartrate 100 milliGRAM(s) Oral every 12 hours        PHYSICAL EXAM:  T(C): 36.7 (04-29-23 @ 12:18), Max: 36.8 (04-28-23 @ 20:06)  HR: 84 (04-29-23 @ 12:18) (68 - 84)  BP: 127/73 (04-29-23 @ 12:18) (114/60 - 147/81)  RR: 18 (04-29-23 @ 12:18) (18 - 18)  SpO2: 97% (04-29-23 @ 12:18) (90% - 100%)  Wt(kg): --  I&O's Summary    28 Apr 2023 07:01  -  29 Apr 2023 07:00  --------------------------------------------------------  IN: 680 mL / OUT: 1500 mL / NET: -820 mL    29 Apr 2023 07:01  -  29 Apr 2023 15:15  --------------------------------------------------------  IN: 630 mL / OUT: 0 mL / NET: 630 mL          Appearance: In no distress	  HEENT:    PERRL, EOMI	  Cardiovascular:  S1 S2, No JVD  Respiratory: Lungs clear to auscultation	  Gastrointestinal:  Soft, Non-tender, + BS	  Vascularature:  No edema of LE  Psychiatric: Appropriate affect   Neuro: no acute focal deficits                         8.7    8.35  )-----------( 323      ( 29 Apr 2023 06:12 )             30.2     04-29    140  |  97  |  23  ----------------------------<  189<H>  4.1   |  34<H>  |  1.67<H>    Ca    9.1      29 Apr 2023 06:11  Phos  3.0     04-29  Mg     1.6     04-29          Labs personally reviewed      ASSESSMENT/PLAN: 	    Ms. Batista is a 84 YO woman with PMH of HTN, HLD, DM2, HFpEF (EF 65%), CAD s/p CABG, Breast CA s/p R partial mastectomy, rectal CA s/p resection with recent admission for acute rissa s/p ERCP w/ stent c/b gallbladder perf w/ perc rissa and recurrent SVT, presenting for shortness of breath. Denies CP, palpitations, orthopnea or syncope. Follows as OP with Dr. Hagan.    Problem/Plan -1  Problem: Acute on Chronic Diastolic CHF  - POCUS suggestive of pulm edema  - CXR with no pulm edema or effusion. Prior CT one month ago with moderate effusions and pulm edema  - proBNP 2505  - Prior TTE 3/23 shows preserved EF, hyperdynamic LV function, basal inferoseptum hypokinesis, decreased RV systolic function severe pulm pressures, B/L pleural effusions  - c/w Bumex 2mg PO Daily with close attention to Cr/BUN/Bicarb  - Strict I&Os, daily weights  - Wean supplemental oxygen as tolerated    Problem/Plan -2  Problem: CAD   - s/p CABG 2020  - ECG with no ischemia noted  - Trop 21  - c/w ASA, metoprolol and statin  - Prior TTE 3/23 shows preserved EF, hyperdynamic LV function, basal inferoseptal hypokinesis, decreased RV systolic function severe pulm pressures     Problem/Plan -3  Problem: hx of Recurrent SVT  - c/w Metoprolol 100mg PO BID  - cont to monitor on tele  --150 on tele for 6 secs. Now rate controlled. C/w current BB    Problem/Plan -4  Problem: Atrial Fibrillation  - Patient with n/o A-flutter on previous admission  - c/w Metoprolol   - eliquis 2.5mg PO BID on hold for planned ERCP  - c/w Heparin gtt                MAINE Mancia DO [facility]  Cardiovascular Medicine  [address] [place] [address], [address]  Office: [phone]  Available via call/text on [employer] Teams

## 2023-04-29 NOTE — PROGRESS NOTE ADULT - ATTENDING COMMENTS
84 YO woman with PMH of HTN, HLD, DM2, HFpEF (EF 65%), CAD s/p CABG, Breast CA s/p R partial mastectomy, rectal CA s/p resection with recent admission for acute rissa s/p ERCP w/ stent c/b gallbladder perf w/ perc rissa and recurrent SVT, presenting for shortness of breath,     #Acute on chronic CHF exacerbation  #Acute hypoxic respiratory failure   #H/o cholecystitis   #CKD  #DM  #Chronic Afib  #HTN  #Anemia    - presented due to SOB; s/p bumex 2mg IV in ED and placed on BiPAP; appears to be improving overall; no longer on BiPAP > now on 2LNC  - on bumex 2mg daily; nephro and cardio recs appreciated; (patient is on bumex 1mg daily at home)  - strict I/O; daily weight    - GI recs appreciated; original plan for ERCP 4/26 but postponed due to being on oxygen; wean off O2 as tolerated and f/u GI for procedure as inpatient when able to; patient was originally scheduled as outpatient 4/26  - hold eliquis for now for possible procedure; heparin drip for now.  - anemia panel noted; appears iron deficient; iron supplement added on; IV iron per nephro recs   - bowel regimen.  - PT eval recs outpatient PT    - GOC discussion; DNR/DNI; DAVINA drafted and placed in chart

## 2023-04-29 NOTE — PROGRESS NOTE ADULT - SUBJECTIVE AND OBJECTIVE BOX
Patient is a 83y old  Female who presents with a chief complaint of Shortness of breath (28 Apr 2023 15:50)      SUBJECTIVE / OVERNIGHT EVENTS:    MEDICATIONS  (STANDING):  albuterol/ipratropium for Nebulization 3 milliLiter(s) Nebulizer every 6 hours  aspirin  chewable 81 milliGRAM(s) Oral daily  atorvastatin 40 milliGRAM(s) Oral at bedtime  buMETAnide 2 milliGRAM(s) Oral daily  chlorhexidine 2% Cloths 1 Application(s) Topical <User Schedule>  dextrose 5%. 1000 milliLiter(s) (100 mL/Hr) IV Continuous <Continuous>  dextrose 5%. 1000 milliLiter(s) (50 mL/Hr) IV Continuous <Continuous>  dextrose 50% Injectable 25 Gram(s) IV Push once  dextrose 50% Injectable 25 Gram(s) IV Push once  dextrose 50% Injectable 12.5 Gram(s) IV Push once  ferrous    sulfate 325 milliGRAM(s) Oral daily  gabapentin 200 milliGRAM(s) Oral daily  glucagon  Injectable 1 milliGRAM(s) IntraMuscular once  heparin  Infusion. 1200 Unit(s)/Hr (12 mL/Hr) IV Continuous <Continuous>  insulin glargine Injectable (LANTUS) 7 Unit(s) SubCutaneous at bedtime  insulin lispro (ADMELOG) corrective regimen sliding scale   SubCutaneous at bedtime  insulin lispro (ADMELOG) corrective regimen sliding scale   SubCutaneous three times a day before meals  insulin lispro Injectable (ADMELOG) 4 Unit(s) SubCutaneous three times a day before meals  iron sucrose IVPB 200 milliGRAM(s) IV Intermittent every 24 hours  metoprolol tartrate 100 milliGRAM(s) Oral every 12 hours  mupirocin 2% Nasal 1 Application(s) Both Nostrils two times a day  Nephro-ashley 1 Tablet(s) Oral daily  pantoprazole    Tablet 40 milliGRAM(s) Oral before breakfast  polyethylene glycol 3350 17 Gram(s) Oral daily  senna 2 Tablet(s) Oral at bedtime  sodium bicarbonate 650 milliGRAM(s) Oral daily    MEDICATIONS  (PRN):  acetaminophen     Tablet .. 650 milliGRAM(s) Oral every 6 hours PRN Temp greater or equal to 38C (100.4F), Mild Pain (1 - 3)  dextrose Oral Gel 15 Gram(s) Oral once PRN Blood Glucose LESS THAN 70 milliGRAM(s)/deciliter  melatonin 3 milliGRAM(s) Oral at bedtime PRN Insomnia      CAPILLARY BLOOD GLUCOSE      POCT Blood Glucose.: 220 mg/dL (28 Apr 2023 21:13)  POCT Blood Glucose.: 218 mg/dL (28 Apr 2023 17:02)  POCT Blood Glucose.: 238 mg/dL (28 Apr 2023 11:20)  POCT Blood Glucose.: 200 mg/dL (28 Apr 2023 07:28)    I&O's Summary    27 Apr 2023 07:01  -  28 Apr 2023 07:00  --------------------------------------------------------  IN: 540 mL / OUT: 2150 mL / NET: -1610 mL    28 Apr 2023 07:01  -  29 Apr 2023 06:54  --------------------------------------------------------  IN: 680 mL / OUT: 1500 mL / NET: -820 mL        Vital Signs Last 24 Hrs  T(C): 36.7 (29 Apr 2023 04:13), Max: 36.8 (28 Apr 2023 10:38)  T(F): 98 (29 Apr 2023 04:13), Max: 98.2 (28 Apr 2023 10:38)  HR: 80 (29 Apr 2023 04:13) (68 - 80)  BP: 147/81 (29 Apr 2023 04:13) (113/52 - 147/81)  BP(mean): --  RR: 18 (29 Apr 2023 04:13) (18 - 20)  SpO2: 100% (29 Apr 2023 04:13) (88% - 100%)    Parameters below as of 29 Apr 2023 04:13  Patient On (Oxygen Delivery Method): nasal cannula  O2 Flow (L/min): 2      PHYSICAL EXAM:  GENERAL: Sitting in bed in no acute distress, wearing 2L NC  EYES: Conjunctiva noninjected or pale, sclera anicteric  HENT: NC/AT, moist mucous membranes  NECK: Supple, trachea midline  LUNG: DIffuse wheezes  CV: RRR, Pulses- Radial: 2+ b/l.   ABDOMEN: Nondistended, nontender  MSK: No visible deformities, nontender extremities. R arm with chronic lymphedema, no pitting edema noted in any other extremity.   SKIN: No rashes, bruises  NEURO: AAOx4 (to person, place, time, event), no tremor    LABS:                        8.7    8.35  )-----------( 323      ( 29 Apr 2023 06:12 )             30.2      04-29    140  |  97  |  23  ----------------------------<  189<H>  4.1   |  34<H>  |  1.67<H>    Ca    9.1      29 Apr 2023 06:11  Phos  3.0     04-29  Mg     1.6     04-29    TPro  7.5  /  Alb  3.6  /  TBili  0.3  /  DBili  x   /  AST  19  /  ALT  23  /  AlkPhos  137<H>  04-27    PTT - ( 29 Apr 2023 06:12 )  PTT:86.2 sec          RADIOLOGY & ADDITIONAL TESTS:    Imaging Personally Reviewed:    Consultant(s) Notes Reviewed:      Care Discussed with Consultants/Other Providers:   Patient is a 83y old  Female who presents with a chief complaint of Shortness of breath (28 Apr 2023 15:50)      SUBJECTIVE / OVERNIGHT EVENTS: No acute events overnight. Patient seen and examined at bedside, notes that she was short of breath overnight because the room was too hot. Otherwise denies complaints.       MEDICATIONS  (STANDING):  albuterol/ipratropium for Nebulization 3 milliLiter(s) Nebulizer every 6 hours  aspirin  chewable 81 milliGRAM(s) Oral daily  atorvastatin 40 milliGRAM(s) Oral at bedtime  buMETAnide 2 milliGRAM(s) Oral daily  chlorhexidine 2% Cloths 1 Application(s) Topical <User Schedule>  dextrose 5%. 1000 milliLiter(s) (100 mL/Hr) IV Continuous <Continuous>  dextrose 5%. 1000 milliLiter(s) (50 mL/Hr) IV Continuous <Continuous>  dextrose 50% Injectable 25 Gram(s) IV Push once  dextrose 50% Injectable 25 Gram(s) IV Push once  dextrose 50% Injectable 12.5 Gram(s) IV Push once  ferrous    sulfate 325 milliGRAM(s) Oral daily  gabapentin 200 milliGRAM(s) Oral daily  glucagon  Injectable 1 milliGRAM(s) IntraMuscular once  heparin  Infusion. 1200 Unit(s)/Hr (12 mL/Hr) IV Continuous <Continuous>  insulin glargine Injectable (LANTUS) 7 Unit(s) SubCutaneous at bedtime  insulin lispro (ADMELOG) corrective regimen sliding scale   SubCutaneous at bedtime  insulin lispro (ADMELOG) corrective regimen sliding scale   SubCutaneous three times a day before meals  insulin lispro Injectable (ADMELOG) 4 Unit(s) SubCutaneous three times a day before meals  iron sucrose IVPB 200 milliGRAM(s) IV Intermittent every 24 hours  metoprolol tartrate 100 milliGRAM(s) Oral every 12 hours  mupirocin 2% Nasal 1 Application(s) Both Nostrils two times a day  Nephro-ashley 1 Tablet(s) Oral daily  pantoprazole    Tablet 40 milliGRAM(s) Oral before breakfast  polyethylene glycol 3350 17 Gram(s) Oral daily  senna 2 Tablet(s) Oral at bedtime  sodium bicarbonate 650 milliGRAM(s) Oral daily    MEDICATIONS  (PRN):  acetaminophen     Tablet .. 650 milliGRAM(s) Oral every 6 hours PRN Temp greater or equal to 38C (100.4F), Mild Pain (1 - 3)  dextrose Oral Gel 15 Gram(s) Oral once PRN Blood Glucose LESS THAN 70 milliGRAM(s)/deciliter  melatonin 3 milliGRAM(s) Oral at bedtime PRN Insomnia      CAPILLARY BLOOD GLUCOSE      POCT Blood Glucose.: 220 mg/dL (28 Apr 2023 21:13)  POCT Blood Glucose.: 218 mg/dL (28 Apr 2023 17:02)  POCT Blood Glucose.: 238 mg/dL (28 Apr 2023 11:20)  POCT Blood Glucose.: 200 mg/dL (28 Apr 2023 07:28)    I&O's Summary    27 Apr 2023 07:01  -  28 Apr 2023 07:00  --------------------------------------------------------  IN: 540 mL / OUT: 2150 mL / NET: -1610 mL    28 Apr 2023 07:01  -  29 Apr 2023 06:54  --------------------------------------------------------  IN: 680 mL / OUT: 1500 mL / NET: -820 mL        Vital Signs Last 24 Hrs  T(C): 36.7 (29 Apr 2023 04:13), Max: 36.8 (28 Apr 2023 10:38)  T(F): 98 (29 Apr 2023 04:13), Max: 98.2 (28 Apr 2023 10:38)  HR: 80 (29 Apr 2023 04:13) (68 - 80)  BP: 147/81 (29 Apr 2023 04:13) (113/52 - 147/81)  BP(mean): --  RR: 18 (29 Apr 2023 04:13) (18 - 20)  SpO2: 100% (29 Apr 2023 04:13) (88% - 100%)    Parameters below as of 29 Apr 2023 04:13  Patient On (Oxygen Delivery Method): nasal cannula  O2 Flow (L/min): 2      PHYSICAL EXAM:  GENERAL: Sitting in bed in no acute distress, wearing 2L NC  EYES: Conjunctiva noninjected or pale, sclera anicteric  HENT: NC/AT, moist mucous membranes  NECK: Supple, trachea midline  LUNG: Mild crackles at bilateral bases  CV: RRR, Pulses- Radial: 2+ b/l.   ABDOMEN: Nondistended, nontender  MSK: No visible deformities, nontender extremities. R arm with chronic lymphedema, no pitting edema noted in any other extremity.   SKIN: No rashes, bruises  NEURO: AAOx4 (to person, place, time, event), no tremor    LABS:                        8.7    8.35  )-----------( 323      ( 29 Apr 2023 06:12 )             30.2      04-29    140  |  97  |  23  ----------------------------<  189<H>  4.1   |  34<H>  |  1.67<H>    Ca    9.1      29 Apr 2023 06:11  Phos  3.0     04-29  Mg     1.6     04-29    TPro  7.5  /  Alb  3.6  /  TBili  0.3  /  DBili  x   /  AST  19  /  ALT  23  /  AlkPhos  137<H>  04-27    PTT - ( 29 Apr 2023 06:12 )  PTT:86.2 sec          RADIOLOGY & ADDITIONAL TESTS:    Imaging Personally Reviewed:    Consultant(s) Notes Reviewed:      Care Discussed with Consultants/Other Providers:

## 2023-04-30 LAB
ANION GAP SERPL CALC-SCNC: 12 MMOL/L — SIGNIFICANT CHANGE UP (ref 5–17)
APTT BLD: 62.4 SEC — HIGH (ref 27.5–35.5)
BUN SERPL-MCNC: 26 MG/DL — HIGH (ref 7–23)
CALCIUM SERPL-MCNC: 9.9 MG/DL — SIGNIFICANT CHANGE UP (ref 8.4–10.5)
CHLORIDE SERPL-SCNC: 93 MMOL/L — LOW (ref 96–108)
CO2 SERPL-SCNC: 35 MMOL/L — HIGH (ref 22–31)
CREAT SERPL-MCNC: 1.78 MG/DL — HIGH (ref 0.5–1.3)
EGFR: 28 ML/MIN/1.73M2 — LOW
GLUCOSE BLDC GLUCOMTR-MCNC: 124 MG/DL — HIGH (ref 70–99)
GLUCOSE BLDC GLUCOMTR-MCNC: 196 MG/DL — HIGH (ref 70–99)
GLUCOSE BLDC GLUCOMTR-MCNC: 199 MG/DL — HIGH (ref 70–99)
GLUCOSE BLDC GLUCOMTR-MCNC: 233 MG/DL — HIGH (ref 70–99)
GLUCOSE SERPL-MCNC: 209 MG/DL — HIGH (ref 70–99)
HCT VFR BLD CALC: 34.8 % — SIGNIFICANT CHANGE UP (ref 34.5–45)
HGB BLD-MCNC: 10.1 G/DL — LOW (ref 11.5–15.5)
MAGNESIUM SERPL-MCNC: 1.7 MG/DL — SIGNIFICANT CHANGE UP (ref 1.6–2.6)
MCHC RBC-ENTMCNC: 26.8 PG — LOW (ref 27–34)
MCHC RBC-ENTMCNC: 29 GM/DL — LOW (ref 32–36)
MCV RBC AUTO: 92.3 FL — SIGNIFICANT CHANGE UP (ref 80–100)
NRBC # BLD: 0 /100 WBCS — SIGNIFICANT CHANGE UP (ref 0–0)
PHOSPHATE SERPL-MCNC: 2.7 MG/DL — SIGNIFICANT CHANGE UP (ref 2.5–4.5)
PLATELET # BLD AUTO: 349 K/UL — SIGNIFICANT CHANGE UP (ref 150–400)
POTASSIUM SERPL-MCNC: 4.1 MMOL/L — SIGNIFICANT CHANGE UP (ref 3.5–5.3)
POTASSIUM SERPL-SCNC: 4.1 MMOL/L — SIGNIFICANT CHANGE UP (ref 3.5–5.3)
RBC # BLD: 3.77 M/UL — LOW (ref 3.8–5.2)
RBC # FLD: 15.6 % — HIGH (ref 10.3–14.5)
SODIUM SERPL-SCNC: 140 MMOL/L — SIGNIFICANT CHANGE UP (ref 135–145)
WBC # BLD: 10.45 K/UL — SIGNIFICANT CHANGE UP (ref 3.8–10.5)
WBC # FLD AUTO: 10.45 K/UL — SIGNIFICANT CHANGE UP (ref 3.8–10.5)

## 2023-04-30 PROCEDURE — 99233 SBSQ HOSP IP/OBS HIGH 50: CPT | Mod: GC

## 2023-04-30 RX ORDER — MAGNESIUM OXIDE 400 MG ORAL TABLET 241.3 MG
400 TABLET ORAL ONCE
Refills: 0 | Status: COMPLETED | OUTPATIENT
Start: 2023-04-30 | End: 2023-04-30

## 2023-04-30 RX ADMIN — BUMETANIDE 2 MILLIGRAM(S): 0.25 INJECTION INTRAMUSCULAR; INTRAVENOUS at 05:15

## 2023-04-30 RX ADMIN — Medication 4 UNIT(S): at 07:48

## 2023-04-30 RX ADMIN — Medication 650 MILLIGRAM(S): at 11:07

## 2023-04-30 RX ADMIN — INSULIN GLARGINE 7 UNIT(S): 100 INJECTION, SOLUTION SUBCUTANEOUS at 21:44

## 2023-04-30 RX ADMIN — Medication 3 MILLILITER(S): at 11:09

## 2023-04-30 RX ADMIN — Medication 3 MILLILITER(S): at 17:06

## 2023-04-30 RX ADMIN — HEPARIN SODIUM 1200 UNIT(S)/HR: 5000 INJECTION INTRAVENOUS; SUBCUTANEOUS at 19:09

## 2023-04-30 RX ADMIN — CHLORHEXIDINE GLUCONATE 1 APPLICATION(S): 213 SOLUTION TOPICAL at 05:15

## 2023-04-30 RX ADMIN — HEPARIN SODIUM 1200 UNIT(S)/HR: 5000 INJECTION INTRAVENOUS; SUBCUTANEOUS at 07:56

## 2023-04-30 RX ADMIN — Medication 81 MILLIGRAM(S): at 11:08

## 2023-04-30 RX ADMIN — Medication 325 MILLIGRAM(S): at 11:08

## 2023-04-30 RX ADMIN — Medication 100 MILLIGRAM(S): at 05:16

## 2023-04-30 RX ADMIN — MUPIROCIN 1 APPLICATION(S): 20 OINTMENT TOPICAL at 17:05

## 2023-04-30 RX ADMIN — Medication 3 MILLILITER(S): at 05:15

## 2023-04-30 RX ADMIN — Medication 2: at 17:08

## 2023-04-30 RX ADMIN — Medication 1 TABLET(S): at 11:08

## 2023-04-30 RX ADMIN — MUPIROCIN 1 APPLICATION(S): 20 OINTMENT TOPICAL at 05:15

## 2023-04-30 RX ADMIN — Medication 4 UNIT(S): at 17:08

## 2023-04-30 RX ADMIN — SENNA PLUS 2 TABLET(S): 8.6 TABLET ORAL at 21:44

## 2023-04-30 RX ADMIN — Medication 1: at 11:40

## 2023-04-30 RX ADMIN — ATORVASTATIN CALCIUM 40 MILLIGRAM(S): 80 TABLET, FILM COATED ORAL at 21:44

## 2023-04-30 RX ADMIN — IRON SUCROSE 110 MILLIGRAM(S): 20 INJECTION, SOLUTION INTRAVENOUS at 12:03

## 2023-04-30 RX ADMIN — PANTOPRAZOLE SODIUM 40 MILLIGRAM(S): 20 TABLET, DELAYED RELEASE ORAL at 05:15

## 2023-04-30 RX ADMIN — Medication 4 UNIT(S): at 11:40

## 2023-04-30 RX ADMIN — GABAPENTIN 200 MILLIGRAM(S): 400 CAPSULE ORAL at 11:08

## 2023-04-30 RX ADMIN — MAGNESIUM OXIDE 400 MG ORAL TABLET 400 MILLIGRAM(S): 241.3 TABLET ORAL at 11:40

## 2023-04-30 RX ADMIN — Medication 1: at 07:48

## 2023-04-30 RX ADMIN — Medication 100 MILLIGRAM(S): at 17:05

## 2023-04-30 RX ADMIN — POLYETHYLENE GLYCOL 3350 17 GRAM(S): 17 POWDER, FOR SOLUTION ORAL at 11:08

## 2023-04-30 RX ADMIN — HEPARIN SODIUM 1200 UNIT(S)/HR: 5000 INJECTION INTRAVENOUS; SUBCUTANEOUS at 21:45

## 2023-04-30 NOTE — PROGRESS NOTE ADULT - ATTENDING COMMENTS
84 YO woman with PMH of HTN, HLD, DM2, HFpEF (EF 65%), CAD s/p CABG, Breast CA s/p R partial mastectomy, rectal CA s/p resection with recent admission for acute rissa s/p ERCP w/ stent c/b gallbladder perf w/ perc rissa and recurrent SVT, presenting for shortness of breath,     #Acute on chronic CHF exacerbation  #Acute hypoxic respiratory failure   #H/o cholecystitis   #CKD  #DM  #Chronic Afib  #HTN  #Anemia    - presented due to SOB; s/p bumex 2mg IV in ED and placed on BiPAP; appears to be improving overall; no longer on BiPAP > now on 2LNC, wean as tolerated   - on bumex 2mg daily; nephro and cardio recs appreciated; (patient is on bumex 1mg daily at home)  - strict I/O; daily weight    - GI recs appreciated; original plan for ERCP 4/26 but postponed due to being on oxygen; wean off O2 as tolerated and f/u GI for procedure as inpatient when able to; patient was originally scheduled as outpatient 4/26  - hold eliquis for now for possible procedure; heparin drip for now.  - anemia panel noted; appears iron deficient; iron supplement added on; IV iron per nephro recs   - bowel regimen.  - PT eval recs outpatient PT    - GOC discussion; DNR/DNI; DAVINA drafted and placed in chart .

## 2023-04-30 NOTE — PROGRESS NOTE ADULT - SUBJECTIVE AND OBJECTIVE BOX
Overnight events noted      VITAL:  T(C): , Max: 36.7 (04-29-23 @ 12:18)  T(F): , Max: 98 (04-29-23 @ 12:18)  HR: 73 (04-30-23 @ 11:20)  BP: 127/78 (04-30-23 @ 11:20)  BP(mean): --  RR: 18 (04-30-23 @ 11:20)  SpO2: 94% (04-30-23 @ 11:20)  Wt(kg): --      PHYSICAL EXAM:  General: Alerted, oriented  HEENT: MMM  Lungs:CTA-b/l  Heart: RRR S1S2  Abdomen: Soft, NTND  Ext: trace  edema b/l  : no chen    LABS:                        10.1   10.45 )-----------( 349      ( 30 Apr 2023 06:45 )             34.8     Na(140)/K(4.1)/Cl(93)/HCO3(35)/BUN(26)/Cr(1.78)Glu(209)/Ca(9.9)/Mg(1.7)/PO4(2.7)    04-30 @ 06:44  Na(140)/K(4.1)/Cl(97)/HCO3(34)/BUN(23)/Cr(1.67)Glu(189)/Ca(9.1)/Mg(1.6)/PO4(3.0)    04-29 @ 06:11  Na(141)/K(3.9)/Cl(98)/HCO3(31)/BUN(25)/Cr(1.71)Glu(158)/Ca(9.3)/Mg(1.6)/PO4(3.9)    04-28 @ 06:51

## 2023-04-30 NOTE — PROGRESS NOTE ADULT - ASSESSMENT
ASSESSMENT:  Ms. Batista is a 84 YO woman with PMH of HTN, HLD, DM2, HFpEF (EF 65%), CAD s/p CABG, Breast CA s/p R partial mastectomy, rectal CA s/p resection with previous admission for acute rissa s/p ERCP w/ stent c/b gallbladder perf w/ perc rissa and recurrent SVT, and recent admission for CHF exacerbation presenting for shortness of breath.----out patient nephrologist Dr Kan .    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,  nephrology aware , previous diuretic test showed minimal  function of that kidney)  CVS- Bp controlled at present  hypervolumic - improving  Electrolytes - controlled  Anemia- -ckd related  also consistent  iron deficiency , tsat 7, ferritin 28  GI--planning for EGD   met alkalosis --- in the view of diuretic --- cont to monitor     RECOMMENDATIONS  1)Renal:  renal fx stable   cont  bumex 2 mg PO BID   keep k ~4, mag 2   avoid nephrotoxic medication  dose all medications for  GFR  30-35   ml/min  daily bmp , mag and Po4   renal diet   monitor I/Os  2)CVS: continue BP meds as ordered for now  3) Venofer 200 mg IVPB for 5 doses daily   (4) mag ox       [facility] NP-C   [facility]  Office: [phone]

## 2023-04-30 NOTE — PROGRESS NOTE ADULT - PROBLEM SELECTOR PLAN 2
- Currently on 2L NC  - Will attempt to wean to NC  - Not on home O2  - Duonebs q6 for wheezes  - Incentive spirometry

## 2023-04-30 NOTE — PROGRESS NOTE ADULT - PROBLEM SELECTOR PLAN 8
DVT: Per daughter, history of VTE, and history of afib, will hold Eliquis for possible procedure and start heparin drip  Dispo: PT rec outpatient PT  Diet: CC/DASH diet DVT: Per daughter, history of VTE, and history of afib, will hold Eliquis for possible procedure and start heparin drip  Dispo: PT rec outpatient PT  Diet: CC/DASH diet    Family updated at bedside

## 2023-04-30 NOTE — PROGRESS NOTE ADULT - SUBJECTIVE AND OBJECTIVE BOX
DATE OF SERVICE: 04-30-23 @ 14:58    Patient is a 83y old  Female who presents with a chief complaint of Shortness of breath (30 Apr 2023 11:24)      INTERVAL HISTORY: no complaints     REVIEW OF SYSTEMS:  CONSTITUTIONAL: No weakness  EYES/ENT: No visual changes;  No throat pain   NECK: No pain or stiffness  RESPIRATORY: No cough, wheezing; No shortness of breath  CARDIOVASCULAR: No chest pain or palpitations  GASTROINTESTINAL: No abdominal  pain. No nausea, vomiting, or hematemesis  GENITOURINARY: No dysuria, frequency or hematuria  NEUROLOGICAL: No stroke like symptoms  SKIN: No rashes    TELEMETRY Personally reviewed:  	  MEDICATIONS:  buMETAnide 2 milliGRAM(s) Oral daily  metoprolol tartrate 100 milliGRAM(s) Oral every 12 hours        PHYSICAL EXAM:  T(C): 36.6 (04-30-23 @ 11:20), Max: 36.6 (04-30-23 @ 11:20)  HR: 73 (04-30-23 @ 11:20) (69 - 87)  BP: 127/78 (04-30-23 @ 11:20) (105/62 - 159/70)  RR: 18 (04-30-23 @ 11:47) (18 - 18)  SpO2: 87% (04-30-23 @ 11:47) (87% - 98%)  Wt(kg): --  I&O's Summary    29 Apr 2023 07:01  -  30 Apr 2023 07:00  --------------------------------------------------------  IN: 862 mL / OUT: 1900 mL / NET: -1038 mL    30 Apr 2023 07:01  -  30 Apr 2023 14:58  --------------------------------------------------------  IN: 630 mL / OUT: 1000 mL / NET: -370 mL          Appearance: In no distress	  HEENT:    PERRL, EOMI	  Cardiovascular:  S1 S2, No JVD  Respiratory: Lungs clear to auscultation	  Gastrointestinal:  Soft, Non-tender, + BS	  Vascularature:  No edema of LE  Psychiatric: Appropriate affect   Neuro: no acute focal deficits                               10.1   10.45 )-----------( 349      ( 30 Apr 2023 06:45 )             34.8     04-30    140  |  93<L>  |  26<H>  ----------------------------<  209<H>  4.1   |  35<H>  |  1.78<H>    Ca    9.9      30 Apr 2023 06:44  Phos  2.7     04-30  Mg     1.7     04-30          Labs personally reviewed      ASSESSMENT/PLAN: 	      Ms. Batista is a 82 YO woman with PMH of HTN, HLD, DM2, HFpEF (EF 65%), CAD s/p CABG, Breast CA s/p R partial mastectomy, rectal CA s/p resection with recent admission for acute rissa s/p ERCP w/ stent c/b gallbladder perf w/ perc rissa and recurrent SVT, presenting for shortness of breath. Denies CP, palpitations, orthopnea or syncope. Follows as OP with Dr. Hagan.    Problem/Plan -1  Problem: Acute on Chronic Diastolic CHF  - POCUS suggestive of pulm edema  - CXR with no pulm edema or effusion. Prior CT one month ago with moderate effusions and pulm edema  - proBNP 2505  - Prior TTE 3/23 shows preserved EF, hyperdynamic LV function, basal inferoseptum hypokinesis, decreased RV systolic function severe pulm pressures, B/L pleural effusions  - c/w Bumex 2mg PO Daily with close attention to Cr/BUN/Bicarb  - Strict I&Os, daily weights  - Wean supplemental oxygen as tolerated    Problem/Plan -2  Problem: CAD   - s/p CABG 2020  - ECG with no ischemia noted  - Trop 21  - c/w ASA, metoprolol and statin  - Prior TTE 3/23 shows preserved EF, hyperdynamic LV function, basal inferoseptal hypokinesis, decreased RV systolic function severe pulm pressures     Problem/Plan -3  Problem: hx of Recurrent SVT  - c/w Metoprolol 100mg PO BID  - cont to monitor on tele  --150 on tele for 6 secs. Now rate controlled. C/w current BB    Problem/Plan -4  Problem: Atrial Fibrillation  - Patient with n/o A-flutter on previous admission  - c/w Metoprolol   - eliquis 2.5mg PO BID on hold for planned ERCP  - c/w Heparin gtt            MAINE Mancia DO [facility]  Cardiovascular Medicine  [address] [place] [address], [address]  Office: [phone]  Available via call/text on [employer] Teams  DATE OF SERVICE: 04-30-23 @ 14:58    Patient is a 83y old  Female who presents with a chief complaint of Shortness of breath (30 Apr 2023 11:24)      INTERVAL HISTORY: no complaints     REVIEW OF SYSTEMS:  CONSTITUTIONAL: No weakness  EYES/ENT: No visual changes;  No throat pain   NECK: No pain or stiffness  RESPIRATORY: No cough, wheezing; No shortness of breath  CARDIOVASCULAR: No chest pain or palpitations  GASTROINTESTINAL: No abdominal  pain. No nausea, vomiting, or hematemesis  GENITOURINARY: No dysuria, frequency or hematuria  NEUROLOGICAL: No stroke like symptoms  SKIN: No rashes    	  MEDICATIONS:  buMETAnide 2 milliGRAM(s) Oral daily  metoprolol tartrate 100 milliGRAM(s) Oral every 12 hours        PHYSICAL EXAM:  T(C): 36.6 (04-30-23 @ 11:20), Max: 36.6 (04-30-23 @ 11:20)  HR: 73 (04-30-23 @ 11:20) (69 - 87)  BP: 127/78 (04-30-23 @ 11:20) (105/62 - 159/70)  RR: 18 (04-30-23 @ 11:47) (18 - 18)  SpO2: 87% (04-30-23 @ 11:47) (87% - 98%)  Wt(kg): --  I&O's Summary    29 Apr 2023 07:01  -  30 Apr 2023 07:00  --------------------------------------------------------  IN: 862 mL / OUT: 1900 mL / NET: -1038 mL    30 Apr 2023 07:01  -  30 Apr 2023 14:58  --------------------------------------------------------  IN: 630 mL / OUT: 1000 mL / NET: -370 mL          Appearance: In no distress	  HEENT:    PERRL, EOMI	  Cardiovascular:  S1 S2, No JVD  Respiratory: Lungs clear to auscultation	  Gastrointestinal:  Soft, Non-tender, + BS	  Vascularature:  No edema of LE  Psychiatric: Appropriate affect   Neuro: no acute focal deficits                               10.1   10.45 )-----------( 349      ( 30 Apr 2023 06:45 )             34.8     04-30    140  |  93<L>  |  26<H>  ----------------------------<  209<H>  4.1   |  35<H>  |  1.78<H>    Ca    9.9      30 Apr 2023 06:44  Phos  2.7     04-30  Mg     1.7     04-30          Labs personally reviewed      ASSESSMENT/PLAN: 	      Ms. Batista is a 84 YO woman with PMH of HTN, HLD, DM2, HFpEF (EF 65%), CAD s/p CABG, Breast CA s/p R partial mastectomy, rectal CA s/p resection with recent admission for acute rissa s/p ERCP w/ stent c/b gallbladder perf w/ perc rissa and recurrent SVT, presenting for shortness of breath. Denies CP, palpitations, orthopnea or syncope. Follows as OP with Dr. Hagan.    Problem/Plan -1  Problem: Acute on Chronic Diastolic CHF  - POCUS suggestive of pulm edema  - CXR with no pulm edema or effusion. Prior CT one month ago with moderate effusions and pulm edema  - proBNP 2505  - Prior TTE 3/23 shows preserved EF, hyperdynamic LV function, basal inferoseptum hypokinesis, decreased RV systolic function severe pulm pressures, B/L pleural effusions  - c/w Bumex 2mg PO Daily with close attention to Cr/BUN/Bicarb  - Strict I&Os, daily weights  - Wean supplemental oxygen as tolerated    Problem/Plan -2  Problem: CAD   - s/p CABG 2020  - ECG with no ischemia noted  - Trop 21  - c/w ASA, metoprolol and statin  - Prior TTE 3/23 shows preserved EF, hyperdynamic LV function, basal inferoseptal hypokinesis, decreased RV systolic function severe pulm pressures     Problem/Plan -3  Problem: hx of Recurrent SVT  - c/w Metoprolol 100mg PO BID  - cont to monitor on tele  --150 on tele for 6 secs. Now rate controlled. C/w current BB    Problem/Plan -4  Problem: Atrial Fibrillation  - Patient with n/o A-flutter on previous admission  - c/w Metoprolol   - eliquis 2.5mg PO BID on hold for planned ERCP  - c/w Heparin gtt            MAINE Mancia DO [facility]  Cardiovascular Medicine  [address] [place] [address], [address]  Office: [phone]  Available via call/text on [employer] Teams  DATE OF SERVICE: 04-30-23 @ 14:58    Patient is a 83y old  Female who presents with a chief complaint of Shortness of breath (30 Apr 2023 11:24)      INTERVAL HISTORY: no complaints     REVIEW OF SYSTEMS:  CONSTITUTIONAL: No weakness  EYES/ENT: No visual changes;  No throat pain   NECK: No pain or stiffness  RESPIRATORY: No cough, wheezing; No shortness of breath  CARDIOVASCULAR: No chest pain or palpitations  GASTROINTESTINAL: No abdominal  pain. No nausea, vomiting, or hematemesis  GENITOURINARY: No dysuria, frequency or hematuria  NEUROLOGICAL: No stroke like symptoms  SKIN: No rashes    	  MEDICATIONS:  buMETAnide 2 milliGRAM(s) Oral daily  metoprolol tartrate 100 milliGRAM(s) Oral every 12 hours        PHYSICAL EXAM:  T(C): 36.6 (04-30-23 @ 11:20), Max: 36.6 (04-30-23 @ 11:20)  HR: 73 (04-30-23 @ 11:20) (69 - 87)  BP: 127/78 (04-30-23 @ 11:20) (105/62 - 159/70)  RR: 18 (04-30-23 @ 11:47) (18 - 18)  SpO2: 87% (04-30-23 @ 11:47) (87% - 98%)  Wt(kg): --  I&O's Summary    29 Apr 2023 07:01  -  30 Apr 2023 07:00  --------------------------------------------------------  IN: 862 mL / OUT: 1900 mL / NET: -1038 mL    30 Apr 2023 07:01  -  30 Apr 2023 14:58  --------------------------------------------------------  IN: 630 mL / OUT: 1000 mL / NET: -370 mL          Appearance: In no distress	  HEENT:    PERRL, EOMI	  Cardiovascular:  S1 S2, No JVD  Respiratory: Lungs clear to auscultation	  Gastrointestinal:  Soft, Non-tender, + BS	  Vascularature:  No edema of LE  Psychiatric: Appropriate affect   Neuro: no acute focal deficits                               10.1   10.45 )-----------( 349      ( 30 Apr 2023 06:45 )             34.8     04-30    140  |  93<L>  |  26<H>  ----------------------------<  209<H>  4.1   |  35<H>  |  1.78<H>    Ca    9.9      30 Apr 2023 06:44  Phos  2.7     04-30  Mg     1.7     04-30          Labs personally reviewed      ASSESSMENT/PLAN: 	      Ms. Baitsta is a 82 YO woman with PMH of HTN, HLD, DM2, HFpEF (EF 65%), CAD s/p CABG, Breast CA s/p R partial mastectomy, rectal CA s/p resection with recent admission for acute rissa s/p ERCP w/ stent c/b gallbladder perf w/ perc rissa and recurrent SVT, presenting for shortness of breath. Denies CP, palpitations, orthopnea or syncope. Follows as OP with Dr. Hagan.    Problem/Plan -1  Problem: Acute on Chronic Diastolic CHF  - POCUS suggestive of pulm edema  - CXR with no pulm edema or effusion. Prior CT one month ago with moderate effusions and pulm edema  - proBNP 2505  - Prior TTE 3/23 shows preserved EF, hyperdynamic LV function, basal inferoseptum hypokinesis, decreased RV systolic function severe pulm pressures, B/L pleural effusions  - c/w Bumex 2mg PO Daily with close attention to Cr/BUN/Bicarb  - repeat proBNP Monday  - Strict I&Os, daily weights  - Wean supplemental oxygen as tolerated, hypoxic to 87% on RA    Problem/Plan -2  Problem: CAD   - s/p CABG 2020  - ECG with no ischemia noted  - Trop 21  - c/w ASA, metoprolol and statin  - Prior TTE 3/23 shows preserved EF, hyperdynamic LV function, basal inferoseptal hypokinesis, decreased RV systolic function severe pulm pressures     Problem/Plan -3  Problem: hx of Recurrent SVT  - c/w Metoprolol 100mg PO BID  - cont to monitor on tele  --150 on tele for 6 secs. Now rate controlled. C/w current BB    Problem/Plan -4  Problem: Atrial Fibrillation  - Patient with n/o A-flutter on previous admission  - c/w Metoprolol   - eliquis 2.5mg PO BID on hold for planned ERCP  - c/w Heparin gtt            MAINE Mancia DO [facility]  Cardiovascular Medicine  [address] [place] [address], [address]  Office: [phone]  Available via call/text on [employer] Teams

## 2023-04-30 NOTE — PROGRESS NOTE ADULT - SUBJECTIVE AND OBJECTIVE BOX
Stone Cole MD  Internal Medicine      Patient is a 83y old  Female who presents with a chief complaint of Shortness of breath (29 Apr 2023 15:14)      SUBJECTIVE / OVERNIGHT EVENTS:    ON no acute events.     Tele reviewed with tech sinus 60s-100 PVC trigeminy     No acute concerns at bedside. anticipating procedure 5/1. No further questions.     States she will try to remove oxygen. Removed during subjective and patient feels OK with out dyspnea and understands importance of staying off supplemental oxygen for procedure.     History obtained by Stone Cole MD in patient native language of [place] and physician speaks medical [place].       ADDITIONAL REVIEW OF SYSTEMS:    Const no fever chills or sweats  CV no chest pain or palpitation  Pulm no dyspnea or cough      MEDICATIONS  (STANDING):  albuterol/ipratropium for Nebulization 3 milliLiter(s) Nebulizer every 6 hours  aspirin  chewable 81 milliGRAM(s) Oral daily  atorvastatin 40 milliGRAM(s) Oral at bedtime  buMETAnide 2 milliGRAM(s) Oral daily  chlorhexidine 2% Cloths 1 Application(s) Topical <User Schedule>  dextrose 5%. 1000 milliLiter(s) (50 mL/Hr) IV Continuous <Continuous>  dextrose 5%. 1000 milliLiter(s) (100 mL/Hr) IV Continuous <Continuous>  dextrose 50% Injectable 25 Gram(s) IV Push once  dextrose 50% Injectable 25 Gram(s) IV Push once  dextrose 50% Injectable 12.5 Gram(s) IV Push once  ferrous    sulfate 325 milliGRAM(s) Oral daily  gabapentin 200 milliGRAM(s) Oral daily  glucagon  Injectable 1 milliGRAM(s) IntraMuscular once  heparin  Infusion. 1200 Unit(s)/Hr (12 mL/Hr) IV Continuous <Continuous>  insulin glargine Injectable (LANTUS) 7 Unit(s) SubCutaneous at bedtime  insulin lispro (ADMELOG) corrective regimen sliding scale   SubCutaneous at bedtime  insulin lispro (ADMELOG) corrective regimen sliding scale   SubCutaneous three times a day before meals  insulin lispro Injectable (ADMELOG) 4 Unit(s) SubCutaneous three times a day before meals  iron sucrose IVPB 200 milliGRAM(s) IV Intermittent every 24 hours  metoprolol tartrate 100 milliGRAM(s) Oral every 12 hours  mupirocin 2% Nasal 1 Application(s) Both Nostrils two times a day  Nephro-ashley 1 Tablet(s) Oral daily  pantoprazole    Tablet 40 milliGRAM(s) Oral before breakfast  polyethylene glycol 3350 17 Gram(s) Oral daily  senna 2 Tablet(s) Oral at bedtime  sodium bicarbonate 650 milliGRAM(s) Oral daily    MEDICATIONS  (PRN):  acetaminophen     Tablet .. 650 milliGRAM(s) Oral every 6 hours PRN Temp greater or equal to 38C (100.4F), Mild Pain (1 - 3)  dextrose Oral Gel 15 Gram(s) Oral once PRN Blood Glucose LESS THAN 70 milliGRAM(s)/deciliter  melatonin 3 milliGRAM(s) Oral at bedtime PRN Insomnia      CAPILLARY BLOOD GLUCOSE      POCT Blood Glucose.: 196 mg/dL (30 Apr 2023 07:45)  POCT Blood Glucose.: 214 mg/dL (29 Apr 2023 21:24)  POCT Blood Glucose.: 188 mg/dL (29 Apr 2023 17:01)  POCT Blood Glucose.: 245 mg/dL (29 Apr 2023 11:59)    I&O's Summary    29 Apr 2023 07:01  -  30 Apr 2023 07:00  --------------------------------------------------------  IN: 862 mL / OUT: 1900 mL / NET: -1038 mL        PHYSICAL EXAM:  Vital Signs Last 24 Hrs  T(C): 36.3 (30 Apr 2023 04:34), Max: 36.7 (29 Apr 2023 12:18)  T(F): 97.4 (30 Apr 2023 04:34), Max: 98 (29 Apr 2023 12:18)  HR: 69 (30 Apr 2023 04:34) (69 - 87)  BP: 159/70 (30 Apr 2023 04:34) (105/62 - 159/70)  BP(mean): --  RR: 18 (30 Apr 2023 04:34) (18 - 18)  SpO2: 93% (30 Apr 2023 04:34) (93% - 98%)    Parameters below as of 30 Apr 2023 04:34  Patient On (Oxygen Delivery Method): nasal cannula  O2 Flow (L/min): 3    GENERAL: Sitting in bed in no acute distress, wearing 2L NC  EYES: Conjunctiva noninjected or pale, sclera anicteric  HENT: NC/AT, moist mucous membranes  NECK: Supple, trachea midline  LUNG: Mild crackles at bilateral bases  CV: RRR, Pulses- Radial: 2+ b/l.   ABDOMEN: Nondistended, nontender  MSK: No visible deformities, nontender extremities. R arm with chronic lymphedema, no pitting edema noted in any other extremity.   SKIN: No rashes, bruises  NEURO: AAOx4 (to person, place, time, event), no tremor        LABS:                        10.1   10.45 )-----------( 349      ( 30 Apr 2023 06:45 )             34.8     04-30    140  |  93<L>  |  26<H>  ----------------------------<  209<H>  4.1   |  35<H>  |  1.78<H>    Ca    9.9      30 Apr 2023 06:44  Phos  2.7     04-30  Mg     1.7     04-30      PTT - ( 30 Apr 2023 06:45 )  PTT:62.4 sec            RADIOLOGY & ADDITIONAL TESTS:  Results Reviewed:   Imaging Personally Reviewed:  Electrocardiogram Personally Reviewed:    COORDINATION OF CARE:  Care Discussed with Consultants/Other Providers [Y/N]:   Prior or Outpatient Records Reviewed [Y/N]:

## 2023-05-01 LAB
ANION GAP SERPL CALC-SCNC: 12 MMOL/L — SIGNIFICANT CHANGE UP (ref 5–17)
APTT BLD: 100.5 SEC — HIGH (ref 27.5–35.5)
APTT BLD: 52.7 SEC — HIGH (ref 27.5–35.5)
APTT BLD: 58.8 SEC — HIGH (ref 27.5–35.5)
BLD GP AB SCN SERPL QL: NEGATIVE — SIGNIFICANT CHANGE UP
BUN SERPL-MCNC: 25 MG/DL — HIGH (ref 7–23)
CALCIUM SERPL-MCNC: 9.8 MG/DL — SIGNIFICANT CHANGE UP (ref 8.4–10.5)
CHLORIDE SERPL-SCNC: 95 MMOL/L — LOW (ref 96–108)
CO2 SERPL-SCNC: 34 MMOL/L — HIGH (ref 22–31)
CREAT SERPL-MCNC: 1.65 MG/DL — HIGH (ref 0.5–1.3)
EGFR: 30 ML/MIN/1.73M2 — LOW
GLUCOSE BLDC GLUCOMTR-MCNC: 198 MG/DL — HIGH (ref 70–99)
GLUCOSE BLDC GLUCOMTR-MCNC: 242 MG/DL — HIGH (ref 70–99)
GLUCOSE BLDC GLUCOMTR-MCNC: 258 MG/DL — HIGH (ref 70–99)
GLUCOSE BLDC GLUCOMTR-MCNC: 275 MG/DL — HIGH (ref 70–99)
GLUCOSE SERPL-MCNC: 186 MG/DL — HIGH (ref 70–99)
HCT VFR BLD CALC: 30.9 % — LOW (ref 34.5–45)
HGB BLD-MCNC: 9.2 G/DL — LOW (ref 11.5–15.5)
INR BLD: 1.07 RATIO — SIGNIFICANT CHANGE UP (ref 0.88–1.16)
MAGNESIUM SERPL-MCNC: 1.6 MG/DL — SIGNIFICANT CHANGE UP (ref 1.6–2.6)
MCHC RBC-ENTMCNC: 27 PG — SIGNIFICANT CHANGE UP (ref 27–34)
MCHC RBC-ENTMCNC: 29.8 GM/DL — LOW (ref 32–36)
MCV RBC AUTO: 90.6 FL — SIGNIFICANT CHANGE UP (ref 80–100)
NRBC # BLD: 0 /100 WBCS — SIGNIFICANT CHANGE UP (ref 0–0)
NT-PROBNP SERPL-SCNC: 2663 PG/ML — HIGH (ref 0–300)
PHOSPHATE SERPL-MCNC: 3 MG/DL — SIGNIFICANT CHANGE UP (ref 2.5–4.5)
PLATELET # BLD AUTO: 338 K/UL — SIGNIFICANT CHANGE UP (ref 150–400)
POTASSIUM SERPL-MCNC: 4.5 MMOL/L — SIGNIFICANT CHANGE UP (ref 3.5–5.3)
POTASSIUM SERPL-SCNC: 4.5 MMOL/L — SIGNIFICANT CHANGE UP (ref 3.5–5.3)
PROTHROM AB SERPL-ACNC: 12.3 SEC — SIGNIFICANT CHANGE UP (ref 10.5–13.4)
RBC # BLD: 3.41 M/UL — LOW (ref 3.8–5.2)
RBC # FLD: 15.9 % — HIGH (ref 10.3–14.5)
RH IG SCN BLD-IMP: POSITIVE — SIGNIFICANT CHANGE UP
SODIUM SERPL-SCNC: 141 MMOL/L — SIGNIFICANT CHANGE UP (ref 135–145)
WBC # BLD: 11.04 K/UL — HIGH (ref 3.8–10.5)
WBC # FLD AUTO: 11.04 K/UL — HIGH (ref 3.8–10.5)

## 2023-05-01 PROCEDURE — 99232 SBSQ HOSP IP/OBS MODERATE 35: CPT | Mod: GC

## 2023-05-01 PROCEDURE — 71250 CT THORAX DX C-: CPT | Mod: 26

## 2023-05-01 RX ADMIN — ATORVASTATIN CALCIUM 40 MILLIGRAM(S): 80 TABLET, FILM COATED ORAL at 22:05

## 2023-05-01 RX ADMIN — Medication 4 UNIT(S): at 17:34

## 2023-05-01 RX ADMIN — Medication 3: at 11:54

## 2023-05-01 RX ADMIN — Medication 1 TABLET(S): at 12:33

## 2023-05-01 RX ADMIN — MUPIROCIN 1 APPLICATION(S): 20 OINTMENT TOPICAL at 17:35

## 2023-05-01 RX ADMIN — Medication 100 MILLIGRAM(S): at 17:36

## 2023-05-01 RX ADMIN — Medication 650 MILLIGRAM(S): at 12:31

## 2023-05-01 RX ADMIN — HEPARIN SODIUM 1200 UNIT(S)/HR: 5000 INJECTION INTRAVENOUS; SUBCUTANEOUS at 20:04

## 2023-05-01 RX ADMIN — HEPARIN SODIUM 1400 UNIT(S)/HR: 5000 INJECTION INTRAVENOUS; SUBCUTANEOUS at 06:06

## 2023-05-01 RX ADMIN — Medication 650 MILLIGRAM(S): at 05:44

## 2023-05-01 RX ADMIN — MUPIROCIN 1 APPLICATION(S): 20 OINTMENT TOPICAL at 05:42

## 2023-05-01 RX ADMIN — Medication 3: at 17:34

## 2023-05-01 RX ADMIN — INSULIN GLARGINE 7 UNIT(S): 100 INJECTION, SOLUTION SUBCUTANEOUS at 22:03

## 2023-05-01 RX ADMIN — Medication 4 UNIT(S): at 11:52

## 2023-05-01 RX ADMIN — CHLORHEXIDINE GLUCONATE 1 APPLICATION(S): 213 SOLUTION TOPICAL at 05:44

## 2023-05-01 RX ADMIN — Medication 100 MILLIGRAM(S): at 05:43

## 2023-05-01 RX ADMIN — Medication 2: at 08:10

## 2023-05-01 RX ADMIN — Medication 4 UNIT(S): at 08:10

## 2023-05-01 RX ADMIN — Medication 3 MILLILITER(S): at 17:35

## 2023-05-01 RX ADMIN — Medication 3 MILLILITER(S): at 05:42

## 2023-05-01 RX ADMIN — Medication 81 MILLIGRAM(S): at 12:32

## 2023-05-01 RX ADMIN — HEPARIN SODIUM 1200 UNIT(S)/HR: 5000 INJECTION INTRAVENOUS; SUBCUTANEOUS at 20:54

## 2023-05-01 RX ADMIN — Medication 3 MILLILITER(S): at 00:07

## 2023-05-01 RX ADMIN — Medication 3 MILLILITER(S): at 12:32

## 2023-05-01 RX ADMIN — BUMETANIDE 2 MILLIGRAM(S): 0.25 INJECTION INTRAMUSCULAR; INTRAVENOUS at 05:43

## 2023-05-01 RX ADMIN — Medication 325 MILLIGRAM(S): at 12:31

## 2023-05-01 RX ADMIN — HEPARIN SODIUM 1200 UNIT(S)/HR: 5000 INJECTION INTRAVENOUS; SUBCUTANEOUS at 14:01

## 2023-05-01 RX ADMIN — PANTOPRAZOLE SODIUM 40 MILLIGRAM(S): 20 TABLET, DELAYED RELEASE ORAL at 05:43

## 2023-05-01 RX ADMIN — GABAPENTIN 200 MILLIGRAM(S): 400 CAPSULE ORAL at 12:31

## 2023-05-01 RX ADMIN — SENNA PLUS 2 TABLET(S): 8.6 TABLET ORAL at 22:04

## 2023-05-01 RX ADMIN — Medication 650 MILLIGRAM(S): at 06:44

## 2023-05-01 NOTE — PROGRESS NOTE ADULT - SUBJECTIVE AND OBJECTIVE BOX
Patient is a 83y old  Female who presents with a chief complaint of Shortness of breath (30 Apr 2023 14:58)      SUBJECTIVE / OVERNIGHT EVENTS:    MEDICATIONS  (STANDING):  albuterol/ipratropium for Nebulization 3 milliLiter(s) Nebulizer every 6 hours  aspirin  chewable 81 milliGRAM(s) Oral daily  atorvastatin 40 milliGRAM(s) Oral at bedtime  buMETAnide 2 milliGRAM(s) Oral daily  chlorhexidine 2% Cloths 1 Application(s) Topical <User Schedule>  dextrose 5%. 1000 milliLiter(s) (50 mL/Hr) IV Continuous <Continuous>  dextrose 5%. 1000 milliLiter(s) (100 mL/Hr) IV Continuous <Continuous>  dextrose 50% Injectable 25 Gram(s) IV Push once  dextrose 50% Injectable 12.5 Gram(s) IV Push once  dextrose 50% Injectable 25 Gram(s) IV Push once  ferrous    sulfate 325 milliGRAM(s) Oral daily  gabapentin 200 milliGRAM(s) Oral daily  glucagon  Injectable 1 milliGRAM(s) IntraMuscular once  heparin  Infusion. 1200 Unit(s)/Hr (12 mL/Hr) IV Continuous <Continuous>  insulin glargine Injectable (LANTUS) 7 Unit(s) SubCutaneous at bedtime  insulin lispro (ADMELOG) corrective regimen sliding scale   SubCutaneous three times a day before meals  insulin lispro (ADMELOG) corrective regimen sliding scale   SubCutaneous at bedtime  insulin lispro Injectable (ADMELOG) 4 Unit(s) SubCutaneous three times a day before meals  metoprolol tartrate 100 milliGRAM(s) Oral every 12 hours  mupirocin 2% Nasal 1 Application(s) Both Nostrils two times a day  Nephro-ashley 1 Tablet(s) Oral daily  pantoprazole    Tablet 40 milliGRAM(s) Oral before breakfast  polyethylene glycol 3350 17 Gram(s) Oral daily  senna 2 Tablet(s) Oral at bedtime  sodium bicarbonate 650 milliGRAM(s) Oral daily    MEDICATIONS  (PRN):  acetaminophen     Tablet .. 650 milliGRAM(s) Oral every 6 hours PRN Temp greater or equal to 38C (100.4F), Mild Pain (1 - 3)  dextrose Oral Gel 15 Gram(s) Oral once PRN Blood Glucose LESS THAN 70 milliGRAM(s)/deciliter  melatonin 3 milliGRAM(s) Oral at bedtime PRN Insomnia      CAPILLARY BLOOD GLUCOSE      POCT Blood Glucose.: 124 mg/dL (30 Apr 2023 21:08)  POCT Blood Glucose.: 233 mg/dL (30 Apr 2023 17:06)  POCT Blood Glucose.: 199 mg/dL (30 Apr 2023 11:37)  POCT Blood Glucose.: 196 mg/dL (30 Apr 2023 07:45)    I&O's Summary    30 Apr 2023 07:01  -  01 May 2023 07:00  --------------------------------------------------------  IN: 994 mL / OUT: 1400 mL / NET: -406 mL        Vital Signs Last 24 Hrs  T(C): 36.6 (01 May 2023 05:37), Max: 36.7 (01 May 2023 02:59)  T(F): 97.9 (01 May 2023 05:37), Max: 98.1 (01 May 2023 02:59)  HR: 83 (01 May 2023 05:55) (72 - 141)  BP: 132/83 (01 May 2023 05:37) (127/78 - 166/68)  BP(mean): --  RR: 18 (01 May 2023 06:02) (18 - 19)  SpO2: 95% (01 May 2023 06:02) (87% - 95%)    Parameters below as of 01 May 2023 06:02  Patient On (Oxygen Delivery Method): nasal cannula  O2 Flow (L/min): 2      PHYSICAL EXAM:  GENERAL: Sitting in bed in no acute distress, wearing 2L NC  EYES: Conjunctiva noninjected or pale, sclera anicteric  HENT: NC/AT, moist mucous membranes  NECK: Supple, trachea midline  LUNG: Mild crackles at bilateral bases  CV: RRR, Pulses- Radial: 2+ b/l.   ABDOMEN: Nondistended, nontender  MSK: No visible deformities, nontender extremities. R arm with chronic lymphedema, no pitting edema noted in any other extremity.   SKIN: No rashes, bruises  NEURO: AAOx4 (to person, place, time, event), no tremor    LABS:                        9.2    11.04 )-----------( 338      ( 01 May 2023 05:27 )             30.9      05-01    141  |  95<L>  |  25<H>  ----------------------------<  186<H>  4.5   |  34<H>  |  1.65<H>    Ca    9.8      01 May 2023 05:27  Phos  3.0     05-01  Mg     1.6     05-01      PT/INR - ( 01 May 2023 05:27 )   PT: 12.3 sec;   INR: 1.07 ratio         PTT - ( 01 May 2023 05:27 )  PTT:52.7 sec          RADIOLOGY & ADDITIONAL TESTS:    Imaging Personally Reviewed:    Consultant(s) Notes Reviewed:      Care Discussed with Consultants/Other Providers:   Patient is a 83y old  Female who presents with a chief complaint of Shortness of breath (30 Apr 2023 14:58)      SUBJECTIVE / OVERNIGHT EVENTS: Overnight had an episode of chest pain that corresponded to 1 min 19 seconds of atrial tachycardia, also noted to have short run of aflutter that broke prior to ECG. Today feels no chest pain, feels shortness of breath is about the same.     MEDICATIONS  (STANDING):  albuterol/ipratropium for Nebulization 3 milliLiter(s) Nebulizer every 6 hours  aspirin  chewable 81 milliGRAM(s) Oral daily  atorvastatin 40 milliGRAM(s) Oral at bedtime  buMETAnide 2 milliGRAM(s) Oral daily  chlorhexidine 2% Cloths 1 Application(s) Topical <User Schedule>  dextrose 5%. 1000 milliLiter(s) (50 mL/Hr) IV Continuous <Continuous>  dextrose 5%. 1000 milliLiter(s) (100 mL/Hr) IV Continuous <Continuous>  dextrose 50% Injectable 25 Gram(s) IV Push once  dextrose 50% Injectable 12.5 Gram(s) IV Push once  dextrose 50% Injectable 25 Gram(s) IV Push once  ferrous    sulfate 325 milliGRAM(s) Oral daily  gabapentin 200 milliGRAM(s) Oral daily  glucagon  Injectable 1 milliGRAM(s) IntraMuscular once  heparin  Infusion. 1200 Unit(s)/Hr (12 mL/Hr) IV Continuous <Continuous>  insulin glargine Injectable (LANTUS) 7 Unit(s) SubCutaneous at bedtime  insulin lispro (ADMELOG) corrective regimen sliding scale   SubCutaneous three times a day before meals  insulin lispro (ADMELOG) corrective regimen sliding scale   SubCutaneous at bedtime  insulin lispro Injectable (ADMELOG) 4 Unit(s) SubCutaneous three times a day before meals  metoprolol tartrate 100 milliGRAM(s) Oral every 12 hours  mupirocin 2% Nasal 1 Application(s) Both Nostrils two times a day  Nephro-ashley 1 Tablet(s) Oral daily  pantoprazole    Tablet 40 milliGRAM(s) Oral before breakfast  polyethylene glycol 3350 17 Gram(s) Oral daily  senna 2 Tablet(s) Oral at bedtime  sodium bicarbonate 650 milliGRAM(s) Oral daily    MEDICATIONS  (PRN):  acetaminophen     Tablet .. 650 milliGRAM(s) Oral every 6 hours PRN Temp greater or equal to 38C (100.4F), Mild Pain (1 - 3)  dextrose Oral Gel 15 Gram(s) Oral once PRN Blood Glucose LESS THAN 70 milliGRAM(s)/deciliter  melatonin 3 milliGRAM(s) Oral at bedtime PRN Insomnia      CAPILLARY BLOOD GLUCOSE      POCT Blood Glucose.: 124 mg/dL (30 Apr 2023 21:08)  POCT Blood Glucose.: 233 mg/dL (30 Apr 2023 17:06)  POCT Blood Glucose.: 199 mg/dL (30 Apr 2023 11:37)  POCT Blood Glucose.: 196 mg/dL (30 Apr 2023 07:45)    I&O's Summary    30 Apr 2023 07:01  -  01 May 2023 07:00  --------------------------------------------------------  IN: 994 mL / OUT: 1400 mL / NET: -406 mL        Vital Signs Last 24 Hrs  T(C): 36.6 (01 May 2023 05:37), Max: 36.7 (01 May 2023 02:59)  T(F): 97.9 (01 May 2023 05:37), Max: 98.1 (01 May 2023 02:59)  HR: 83 (01 May 2023 05:55) (72 - 141)  BP: 132/83 (01 May 2023 05:37) (127/78 - 166/68)  BP(mean): --  RR: 18 (01 May 2023 06:02) (18 - 19)  SpO2: 95% (01 May 2023 06:02) (87% - 95%)    Parameters below as of 01 May 2023 06:02  Patient On (Oxygen Delivery Method): nasal cannula  O2 Flow (L/min): 2      PHYSICAL EXAM:  GENERAL: Sitting in bed in no acute distress, wearing 2L NC  EYES: Conjunctiva noninjected or pale, sclera anicteric  HENT: NC/AT, moist mucous membranes  NECK: Supple, trachea midline  LUNG: CTAB  CV: RRR, Pulses- Radial: 2+ b/l.   ABDOMEN: Nondistended, nontender  MSK: No visible deformities, nontender extremities. R arm with chronic lymphedema, no pitting edema noted in any other extremity.   SKIN: No rashes, bruises  NEURO: AAOx4 (to person, place, time, event), no tremor    LABS:                        9.2    11.04 )-----------( 338      ( 01 May 2023 05:27 )             30.9      05-01    141  |  95<L>  |  25<H>  ----------------------------<  186<H>  4.5   |  34<H>  |  1.65<H>    Ca    9.8      01 May 2023 05:27  Phos  3.0     05-01  Mg     1.6     05-01      PT/INR - ( 01 May 2023 05:27 )   PT: 12.3 sec;   INR: 1.07 ratio         PTT - ( 01 May 2023 05:27 )  PTT:52.7 sec          RADIOLOGY & ADDITIONAL TESTS:    Imaging Personally Reviewed:    Consultant(s) Notes Reviewed:      Care Discussed with Consultants/Other Providers:

## 2023-05-01 NOTE — PROGRESS NOTE ADULT - SUBJECTIVE AND OBJECTIVE BOX
DATE OF SERVICE: 05-01-23 @ 10:30    Patient is a 83y old  Female who presents with a chief complaint of Shortness of breath (01 May 2023 07:26)      INTERVAL HISTORY: Reports feeling well, ambulating with assistance    REVIEW OF SYSTEMS:  CONSTITUTIONAL: No weakness  EYES/ENT: No visual changes;  No throat pain   NECK: No pain or stiffness  RESPIRATORY: No cough, wheezing; No shortness of breath  CARDIOVASCULAR: No chest pain or palpitations  GASTROINTESTINAL: No abdominal  pain. No nausea, vomiting, or hematemesis  GENITOURINARY: No dysuria, frequency or hematuria  NEUROLOGICAL: No stroke like symptoms  SKIN: No rashes    TELEMETRY Personally reviewed: AFIB/Aflutter overnight / One episode of Atach for approx 3.5 sec 150s  	  MEDICATIONS:  buMETAnide 2 milliGRAM(s) Oral daily  metoprolol tartrate 100 milliGRAM(s) Oral every 12 hours        PHYSICAL EXAM:  T(C): 36.6 (05-01-23 @ 05:37), Max: 36.7 (05-01-23 @ 02:59)  HR: 83 (05-01-23 @ 05:55) (72 - 141)  BP: 132/83 (05-01-23 @ 05:37) (127/78 - 166/68)  RR: 18 (05-01-23 @ 06:02) (18 - 19)  SpO2: 95% (05-01-23 @ 06:02) (87% - 95%)  Wt(kg): --  I&O's Summary    30 Apr 2023 07:01  -  01 May 2023 07:00  --------------------------------------------------------  IN: 994 mL / OUT: 1400 mL / NET: -406 mL          Appearance: In no distress	  HEENT:    PERRL, EOMI	  Cardiovascular:  S1 S2, No JVD  Respiratory: Lungs clear to auscultation	  Gastrointestinal:  Soft, Non-tender, + BS	  Vascularature:  No edema of LE  Psychiatric: Appropriate affect   Neuro: no acute focal deficits                               9.2    11.04 )-----------( 338      ( 01 May 2023 05:27 )             30.9     05-01    141  |  95<L>  |  25<H>  ----------------------------<  186<H>  4.5   |  34<H>  |  1.65<H>    Ca    9.8      01 May 2023 05:27  Phos  3.0     05-01  Mg     1.6     05-01          Labs personally reviewed      ASSESSMENT/PLAN: 	  Ms. Batista is a 84 YO woman with PMH of HTN, HLD, DM2, HFpEF (EF 65%), CAD s/p CABG, Breast CA s/p R partial mastectomy, rectal CA s/p resection with recent admission for acute rissa s/p ERCP w/ stent c/b gallbladder perf w/ perc rissa and recurrent SVT, presenting for shortness of breath. Denies CP, palpitations, orthopnea or syncope. Follows as OP with Dr. Hagan.    Problem/Plan -1  Problem: Acute on Chronic Diastolic CHF  - POCUS suggestive of pulm edema  - CXR with no pulm edema or effusion. Prior CT one month ago with moderate effusions and pulm edema  - proBNP 2505  - Prior TTE 3/23 shows preserved EF, hyperdynamic LV function, basal inferoseptum hypokinesis, decreased RV systolic function severe pulm pressures, B/L pleural effusions  - c/w Bumex 2mg PO Daily with close attention to Cr/BUN/Bicarb  - repeat proBNP Monday  - Strict I&Os, daily weights  - Wean supplemental oxygen as tolerated, hypoxic to 87% on RA    Problem/Plan -2  Problem: CAD   - s/p CABG 2020  - ECG with no ischemia noted  - Trop 21  - c/w ASA, metoprolol and statin  - Prior TTE 3/23 shows preserved EF, hyperdynamic LV function, basal inferoseptal hypokinesis, decreased RV systolic function severe pulm pressures     Problem/Plan -3  Problem: hx of Recurrent SVT  - c/w Metoprolol 100mg PO BID  - cont to monitor on tele  --150 on tele for 3.5 secs on 5/1.  C/w current BB    Problem/Plan -4  Problem: Atrial Fibrillation  - Patient with n/o A-flutter on previous admission  - c/w Metoprolol   - eliquis 2.5mg PO BID on hold for planned ERCP  - c/w Heparin gtt      Problem/Plan- 5  Problem: Hx of Cholecystitis  - ECRCP on hold pending improvement in respiratory function                  NARGIS Smith [facility]  Cardiovascular Medicine  [address] [place] [address], [address]  Available through call or text on [employer]  Office: [phone]   DATE OF SERVICE: 05-01-23 @ 10:30    Patient is a 83y old  Female who presents with a chief complaint of Shortness of breath (01 May 2023 07:26)      INTERVAL HISTORY: Reports feeling well, ambulating with assistance    REVIEW OF SYSTEMS:  CONSTITUTIONAL: No weakness  EYES/ENT: No visual changes;  No throat pain   NECK: No pain or stiffness  RESPIRATORY: No cough, wheezing; No shortness of breath  CARDIOVASCULAR: No chest pain or palpitations  GASTROINTESTINAL: No abdominal  pain. No nausea, vomiting, or hematemesis  GENITOURINARY: No dysuria, frequency or hematuria  NEUROLOGICAL: No stroke like symptoms  SKIN: No rashes    TELEMETRY Personally reviewed: AFIB/Aflutter overnight / One episode of Atach for approx 3.5 sec 150s  	  MEDICATIONS:  buMETAnide 2 milliGRAM(s) Oral daily  metoprolol tartrate 100 milliGRAM(s) Oral every 12 hours        PHYSICAL EXAM:  T(C): 36.6 (05-01-23 @ 05:37), Max: 36.7 (05-01-23 @ 02:59)  HR: 83 (05-01-23 @ 05:55) (72 - 141)  BP: 132/83 (05-01-23 @ 05:37) (127/78 - 166/68)  RR: 18 (05-01-23 @ 06:02) (18 - 19)  SpO2: 95% (05-01-23 @ 06:02) (87% - 95%)  Wt(kg): --  I&O's Summary    30 Apr 2023 07:01  -  01 May 2023 07:00  --------------------------------------------------------  IN: 994 mL / OUT: 1400 mL / NET: -406 mL          Appearance: In no distress	  HEENT:    PERRL, EOMI	  Cardiovascular:  S1 S2, No JVD  Respiratory: Lungs clear to auscultation	  Gastrointestinal:  Soft, Non-tender, + BS	  Vascularature:  No edema of LE  Psychiatric: Appropriate affect   Neuro: no acute focal deficits                               9.2    11.04 )-----------( 338      ( 01 May 2023 05:27 )             30.9     05-01    141  |  95<L>  |  25<H>  ----------------------------<  186<H>  4.5   |  34<H>  |  1.65<H>    Ca    9.8      01 May 2023 05:27  Phos  3.0     05-01  Mg     1.6     05-01          Labs personally reviewed      ASSESSMENT/PLAN: 	  Ms. Batista is a 82 YO woman with PMH of HTN, HLD, DM2, HFpEF (EF 65%), CAD s/p CABG, Breast CA s/p R partial mastectomy, rectal CA s/p resection with recent admission for acute rissa s/p ERCP w/ stent c/b gallbladder perf w/ perc rissa and recurrent SVT, presenting for shortness of breath. Denies CP, palpitations, orthopnea or syncope. Follows as OP with Dr. Hagan.    Problem/Plan -1  Problem: Acute on Chronic Diastolic CHF  - POCUS suggestive of pulm edema  - CXR with no pulm edema or effusion. Prior CT one month ago with moderate effusions and pulm edema  - proBNP 2505  - Prior TTE 3/23 shows preserved EF, hyperdynamic LV function, basal inferoseptum hypokinesis, decreased RV systolic function severe pulm pressures, B/L pleural effusions  - c/w Bumex 2mg PO Daily with close attention to Cr/BUN/Bicarb  - repeat proBNP Monday  - Strict I&Os, daily weights  - Wean supplemental oxygen as tolerated, hypoxic to 87% on RA    Problem/Plan -2  Problem: CAD   - s/p CABG 2020  - ECG with no ischemia noted  - Trop 21  - c/w ASA, metoprolol and statin  - Prior TTE 3/23 shows preserved EF, hyperdynamic LV function, basal inferoseptal hypokinesis, decreased RV systolic function severe pulm pressures     Problem/Plan -3  Problem: hx of Recurrent SVT  - c/w Metoprolol 100mg PO BID  - cont to monitor on tele  --150 on tele for 3.5 secs on 5/1.  C/w current BB    Problem/Plan -4  Problem: Atrial Fibrillation  - Patient with n/o A-flutter on previous admission  - c/w Metoprolol   - eliquis 2.5mg PO BID on hold for planned ERCP  - c/w Heparin gtt      Problem/Plan- 5  Problem: Hx of Cholecystitis  - ECRCP on hold pending improvement in respiratory function                  NARGIS Smith [facility]  Cardiovascular Medicine  [address] [place] [address], [address]  Available through call or text on [employer] TEAM[employer]  Office: [phone]

## 2023-05-01 NOTE — PROGRESS NOTE ADULT - PROBLEM SELECTOR PLAN 1
ProBNP 2505, TTE 3/23 with EF 76%, severe pulmonary hypertension, moderate diastolic dysfunction. POCUS done in ED cw pulmonary edema  - Diuresis with Bumex 2 mg PO  - Strict I&Os  - Daily weights ProBNP 2505, TTE 3/23 with EF 76%, severe pulmonary hypertension, moderate diastolic dysfunction. POCUS done in ED cw pulmonary edema  - Diuresis with Bumex 2 mg PO  - Strict I&Os  - Daily weights  - Will obtain repeat CT chest as no improvement seen

## 2023-05-01 NOTE — PROGRESS NOTE ADULT - SUBJECTIVE AND OBJECTIVE BOX
Overnight events noted  on  NC   no sob     VITAL:  T(C): , Max: 36.7 (04-29-23 @ 12:18)  T(F): , Max: 98 (04-29-23 @ 12:18)  HR: 73 (04-30-23 @ 11:20)  BP: 127/78 (04-30-23 @ 11:20)  BP(mean): --  RR: 18 (04-30-23 @ 11:20)  SpO2: 94% (04-30-23 @ 11:20)  Wt(kg): --      PHYSICAL EXAM:  General: Alerted, oriented  HEENT: MMM  Lungs:CTA-b/l  Heart: RRR S1S2  Abdomen: Soft, NTND  Ext: trace  edema b/l  : no chen    LABS:                        10.1   10.45 )-----------( 349      ( 30 Apr 2023 06:45 )             34.8     Na(140)/K(4.1)/Cl(93)/HCO3(35)/BUN(26)/Cr(1.78)Glu(209)/Ca(9.9)/Mg(1.7)/PO4(2.7)    04-30 @ 06:44  Na(140)/K(4.1)/Cl(97)/HCO3(34)/BUN(23)/Cr(1.67)Glu(189)/Ca(9.1)/Mg(1.6)/PO4(3.0)    04-29 @ 06:11  Na(141)/K(3.9)/Cl(98)/HCO3(31)/BUN(25)/Cr(1.71)Glu(158)/Ca(9.3)/Mg(1.6)/PO4(3.9)    04-28 @ 06:51

## 2023-05-01 NOTE — PROGRESS NOTE ADULT - ATTENDING COMMENTS
84 YO woman with PMH of HTN, HLD, DM2, HFpEF (EF 65%), CAD s/p CABG, Breast CA s/p R partial mastectomy, rectal CA s/p resection with recent admission for acute rissa s/p ERCP w/ stent c/b gallbladder perf w/ perc rissa and recurrent SVT, presenting for shortness of breath,     #Acute on chronic CHF exacerbation  #Acute hypoxic respiratory failure   #H/o cholecystitis   #CKD  #DM  #Chronic Afib  #HTN  #Anemia    - presented due to SOB; s/p bumex 2mg IV in ED and placed on BiPAP; appears to be improving overall; no longer on BiPAP > now on 2LNC, wean as tolerated   - on bumex 2mg daily; nephro and cardio recs appreciated; (patient is on bumex 1mg daily at home)  - strict I/O; daily weight    - GI recs appreciated; original plan for ERCP 4/26 but postponed due to being on oxygen; wean off O2 as tolerated and f/u GI for procedure as inpatient when able to; patient was originally scheduled as outpatient 4/26  - hold eliquis for now for possible procedure; heparin drip for now.  - anemia panel noted; appears iron deficient; iron supplement added on; completed course of IV iron per nephro recs   - bowel regimen.  - PT eval recs outpatient PT    - GOC discussion; DNR/DNI; MOLST drafted and placed in chart   - attempt to wean off O2; if not able to be weaned, can obtain ambu sats and see if patient requires home O2 prior to DC  - GI f/u for ERCP while inpatient if possible; daughter would prefer procedure to be done while inpatient     Discussed with daughter at bedside.     Rest as above. Discussed with HS. 82 YO woman with PMH of HTN, HLD, DM2, HFpEF (EF 65%), CAD s/p CABG, Breast CA s/p R partial mastectomy, rectal CA s/p resection with recent admission for acute rissa s/p ERCP w/ stent c/b gallbladder perf w/ perc rissa and recurrent SVT, presenting for shortness of breath,     #Acute on chronic CHF exacerbation  #Acute hypoxic respiratory failure   #H/o cholecystitis   #CKD  #DM  #Chronic Afib  #HTN  #Anemia    - presented due to SOB; s/p bumex 2mg IV in ED and placed on BiPAP; appears to be improving overall; no longer on BiPAP > now on 2LNC, wean as tolerated   - on bumex 2mg daily; nephro and cardio recs appreciated; (patient is on bumex 1mg daily at home)  - strict I/O; daily weight    - GI recs appreciated; original plan for ERCP 4/26 but postponed due to being on oxygen; wean off O2 as tolerated and f/u GI for procedure as inpatient when able to; patient was originally scheduled as outpatient 4/26  - hold eliquis for now for possible procedure; heparin drip for now.  - anemia panel noted; appears iron deficient; iron supplement added on; completed course of IV iron per nephro recs   - bowel regimen.  - PT eval recs outpatient PT    - GOC discussion; DNR/DNI; MOLST drafted and placed in chart   - patient still on O2; will obtain CT chest to ensure no other acute abnormalities   - attempt to wean off O2; if not able to be weaned, can obtain ambu sats and see if patient requires home O2 prior to DC  - GI f/u for ERCP while inpatient if possible; daughter would prefer procedure to be done while inpatient     Discussed with daughter at bedside.     Rest as above. Discussed with HS.

## 2023-05-01 NOTE — PROGRESS NOTE ADULT - PROBLEM SELECTOR PLAN 8
DVT: Per daughter, history of VTE, and history of afib, will hold Eliquis for possible procedure and start heparin drip  Dispo: PT rec outpatient PT  Diet: CC/DASH diet    Family updated at bedside DVT: Per daughter, history of VTE, and history of afib, will hold Eliquis for possible procedure and start heparin drip  Dispo: PT rec outpatient PT  Diet: CC/DASH diet

## 2023-05-02 LAB
ALBUMIN SERPL ELPH-MCNC: 3.5 G/DL — SIGNIFICANT CHANGE UP (ref 3.3–5)
ALP SERPL-CCNC: 102 U/L — SIGNIFICANT CHANGE UP (ref 40–120)
ALT FLD-CCNC: 19 U/L — SIGNIFICANT CHANGE UP (ref 10–45)
ANION GAP SERPL CALC-SCNC: 9 MMOL/L — SIGNIFICANT CHANGE UP (ref 5–17)
APTT BLD: 61.2 SEC — HIGH (ref 27.5–35.5)
AST SERPL-CCNC: 21 U/L — SIGNIFICANT CHANGE UP (ref 10–40)
BILIRUB SERPL-MCNC: 0.3 MG/DL — SIGNIFICANT CHANGE UP (ref 0.2–1.2)
BUN SERPL-MCNC: 30 MG/DL — HIGH (ref 7–23)
CALCIUM SERPL-MCNC: 9.5 MG/DL — SIGNIFICANT CHANGE UP (ref 8.4–10.5)
CHLORIDE SERPL-SCNC: 95 MMOL/L — LOW (ref 96–108)
CO2 SERPL-SCNC: 35 MMOL/L — HIGH (ref 22–31)
CREAT SERPL-MCNC: 1.66 MG/DL — HIGH (ref 0.5–1.3)
EGFR: 30 ML/MIN/1.73M2 — LOW
GLUCOSE BLDC GLUCOMTR-MCNC: 227 MG/DL — HIGH (ref 70–99)
GLUCOSE BLDC GLUCOMTR-MCNC: 228 MG/DL — HIGH (ref 70–99)
GLUCOSE BLDC GLUCOMTR-MCNC: 242 MG/DL — HIGH (ref 70–99)
GLUCOSE BLDC GLUCOMTR-MCNC: 301 MG/DL — HIGH (ref 70–99)
GLUCOSE SERPL-MCNC: 213 MG/DL — HIGH (ref 70–99)
HCT VFR BLD CALC: 29.4 % — LOW (ref 34.5–45)
HGB BLD-MCNC: 8.6 G/DL — LOW (ref 11.5–15.5)
MAGNESIUM SERPL-MCNC: 1.7 MG/DL — SIGNIFICANT CHANGE UP (ref 1.6–2.6)
MCHC RBC-ENTMCNC: 27 PG — SIGNIFICANT CHANGE UP (ref 27–34)
MCHC RBC-ENTMCNC: 29.3 GM/DL — LOW (ref 32–36)
MCV RBC AUTO: 92.5 FL — SIGNIFICANT CHANGE UP (ref 80–100)
NRBC # BLD: 0 /100 WBCS — SIGNIFICANT CHANGE UP (ref 0–0)
PHOSPHATE SERPL-MCNC: 3.4 MG/DL — SIGNIFICANT CHANGE UP (ref 2.5–4.5)
PLATELET # BLD AUTO: 306 K/UL — SIGNIFICANT CHANGE UP (ref 150–400)
POTASSIUM SERPL-MCNC: 4.2 MMOL/L — SIGNIFICANT CHANGE UP (ref 3.5–5.3)
POTASSIUM SERPL-SCNC: 4.2 MMOL/L — SIGNIFICANT CHANGE UP (ref 3.5–5.3)
PROT SERPL-MCNC: 6.9 G/DL — SIGNIFICANT CHANGE UP (ref 6–8.3)
RBC # BLD: 3.18 M/UL — LOW (ref 3.8–5.2)
RBC # FLD: 16.2 % — HIGH (ref 10.3–14.5)
SODIUM SERPL-SCNC: 139 MMOL/L — SIGNIFICANT CHANGE UP (ref 135–145)
WBC # BLD: 9.55 K/UL — SIGNIFICANT CHANGE UP (ref 3.8–10.5)
WBC # FLD AUTO: 9.55 K/UL — SIGNIFICANT CHANGE UP (ref 3.8–10.5)

## 2023-05-02 PROCEDURE — 99233 SBSQ HOSP IP/OBS HIGH 50: CPT | Mod: GC

## 2023-05-02 RX ORDER — BUMETANIDE 0.25 MG/ML
1 INJECTION INTRAMUSCULAR; INTRAVENOUS ONCE
Refills: 0 | Status: COMPLETED | OUTPATIENT
Start: 2023-05-02 | End: 2023-05-02

## 2023-05-02 RX ORDER — BUMETANIDE 0.25 MG/ML
1 INJECTION INTRAMUSCULAR; INTRAVENOUS
Refills: 0 | Status: COMPLETED | OUTPATIENT
Start: 2023-05-02 | End: 2023-05-07

## 2023-05-02 RX ORDER — MAGNESIUM OXIDE 400 MG ORAL TABLET 241.3 MG
400 TABLET ORAL ONCE
Refills: 0 | Status: COMPLETED | OUTPATIENT
Start: 2023-05-02 | End: 2023-05-02

## 2023-05-02 RX ADMIN — MAGNESIUM OXIDE 400 MG ORAL TABLET 400 MILLIGRAM(S): 241.3 TABLET ORAL at 09:15

## 2023-05-02 RX ADMIN — MUPIROCIN 1 APPLICATION(S): 20 OINTMENT TOPICAL at 05:40

## 2023-05-02 RX ADMIN — HEPARIN SODIUM 1200 UNIT(S)/HR: 5000 INJECTION INTRAVENOUS; SUBCUTANEOUS at 19:09

## 2023-05-02 RX ADMIN — Medication 3 MILLILITER(S): at 17:27

## 2023-05-02 RX ADMIN — Medication 650 MILLIGRAM(S): at 11:29

## 2023-05-02 RX ADMIN — SENNA PLUS 2 TABLET(S): 8.6 TABLET ORAL at 21:27

## 2023-05-02 RX ADMIN — HEPARIN SODIUM 1200 UNIT(S)/HR: 5000 INJECTION INTRAVENOUS; SUBCUTANEOUS at 07:25

## 2023-05-02 RX ADMIN — Medication 4 UNIT(S): at 11:58

## 2023-05-02 RX ADMIN — HEPARIN SODIUM 1200 UNIT(S)/HR: 5000 INJECTION INTRAVENOUS; SUBCUTANEOUS at 19:14

## 2023-05-02 RX ADMIN — Medication 2: at 11:58

## 2023-05-02 RX ADMIN — Medication 3 MILLILITER(S): at 11:29

## 2023-05-02 RX ADMIN — Medication 325 MILLIGRAM(S): at 11:29

## 2023-05-02 RX ADMIN — Medication 100 MILLIGRAM(S): at 17:27

## 2023-05-02 RX ADMIN — HEPARIN SODIUM 1200 UNIT(S)/HR: 5000 INJECTION INTRAVENOUS; SUBCUTANEOUS at 03:53

## 2023-05-02 RX ADMIN — Medication 4 UNIT(S): at 07:49

## 2023-05-02 RX ADMIN — Medication 2: at 07:50

## 2023-05-02 RX ADMIN — Medication 4 UNIT(S): at 17:38

## 2023-05-02 RX ADMIN — BUMETANIDE 2 MILLIGRAM(S): 0.25 INJECTION INTRAMUSCULAR; INTRAVENOUS at 05:39

## 2023-05-02 RX ADMIN — Medication 3 MILLILITER(S): at 01:06

## 2023-05-02 RX ADMIN — Medication 3 MILLILITER(S): at 05:40

## 2023-05-02 RX ADMIN — Medication 2: at 17:38

## 2023-05-02 RX ADMIN — CHLORHEXIDINE GLUCONATE 1 APPLICATION(S): 213 SOLUTION TOPICAL at 05:40

## 2023-05-02 RX ADMIN — Medication 1 TABLET(S): at 11:28

## 2023-05-02 RX ADMIN — BUMETANIDE 1 MILLIGRAM(S): 0.25 INJECTION INTRAMUSCULAR; INTRAVENOUS at 17:27

## 2023-05-02 RX ADMIN — ATORVASTATIN CALCIUM 40 MILLIGRAM(S): 80 TABLET, FILM COATED ORAL at 21:27

## 2023-05-02 RX ADMIN — Medication 100 MILLIGRAM(S): at 05:40

## 2023-05-02 RX ADMIN — PANTOPRAZOLE SODIUM 40 MILLIGRAM(S): 20 TABLET, DELAYED RELEASE ORAL at 05:39

## 2023-05-02 RX ADMIN — Medication 81 MILLIGRAM(S): at 11:29

## 2023-05-02 RX ADMIN — INSULIN GLARGINE 7 UNIT(S): 100 INJECTION, SOLUTION SUBCUTANEOUS at 21:26

## 2023-05-02 RX ADMIN — Medication 2: at 21:26

## 2023-05-02 RX ADMIN — GABAPENTIN 200 MILLIGRAM(S): 400 CAPSULE ORAL at 11:29

## 2023-05-02 NOTE — PROGRESS NOTE ADULT - ATTENDING COMMENTS
82 YO woman with PMH of HTN, HLD, DM2, HFpEF (EF 65%), CAD s/p CABG, Breast CA s/p R partial mastectomy, rectal CA s/p resection with recent admission for acute rissa s/p ERCP w/ stent c/b gallbladder perf w/ perc rissa and recurrent SVT, presenting for shortness of breath,     #Acute on chronic CHF exacerbation  #Acute hypoxic respiratory failure   #H/o cholecystitis   #CKD  #DM  #Chronic Afib  #HTN  #Anemia    - presented due to SOB; s/p bumex 2mg IV in ED and placed on BiPAP; appears to be improving overall; no longer on BiPAP > now on 2LNC, wean as tolerated   - on bumex 2mg daily; nephro and cardio recs appreciated; (patient is on bumex 1mg daily at home)  - strict I/O; daily weight    - GI recs appreciated; original plan for ERCP 4/26 but postponed due to being on oxygen; wean off O2 as tolerated and f/u GI for procedure as inpatient when able to; patient was originally scheduled as outpatient 4/26  - hold eliquis for now for possible procedure; heparin drip for now.  - anemia panel noted; appears iron deficient; iron supplement added on; completed course of IV iron per nephro recs   - bowel regimen.  - PT eval recs outpatient PT    - GOC discussion; DNR/DNI; MOLST drafted and placed in chart   - patient still on O2; CT chest done showing pulm edema still; per cardio recs, will give an additional 1mg IV bumex today    - attempt to wean off O2; if not able to be weaned, can obtain ambu sats and see if patient requires home O2 prior to DC  - GI f/u for ERCP while inpatient if possible; daughter would prefer procedure to be done while inpatient     Rest as above. Discussed with HS.

## 2023-05-02 NOTE — PROGRESS NOTE ADULT - ASSESSMENT
ASSESSMENT:  Ms. Batista is a 82 YO woman with PMH of HTN, HLD, DM2, HFpEF (EF 65%), CAD s/p CABG, Breast CA s/p R partial mastectomy, rectal CA s/p resection with previous admission for acute rissa s/p ERCP w/ stent c/b gallbladder perf w/ perc rissa and recurrent SVT, and recent admission for CHF exacerbation presenting for shortness of breath.----out patient nephrologist Dr Kan .    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 Batista is a 84 YO woman with PMH of HTN, HLD, DM2, HFpEF (EF 65%), CAD s/p CABG, Breast CA s/p R partial mastectomy, rectal CA s/p resection with previous admission for acute rissa s/p ERCP w/ stent c/b gallbladder perf w/ perc rissa and recurrent SVT, and recent admission for CHF exacerbation presenting for shortness of breath.----out patient nephrologist Dr Kan .    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,  nephrology aware , previous diuretic test showed minimal  function of that kidney)  CVS- BP controlled at present  hypervolemic - improving  Electrolytes - controlled  Anemia- -ckd related  also consistent  iron deficiency , tsat 7, ferritin 28---sp iv venofer  5 doses completed on 4/3023   GI--planning for ERCP  met alkalosis --- in the view of diuretic --- cont to monitor     RECOMMENDATIONS  1)Renal:  renal fx stable   continue bumex 2 mg PO daily   keep k ~4 and mag 2, s/p mag ox 400mg po x 1 this am  avoid nephrotoxic medication  dose all medications for GFR ~30-35 ml/min  daily bmp, mag and Po4   renal diet   monitor I/Os  2)CVS: continue BP meds as ordered for now  3)cont daily oral iron     Frances Mike DNP  [facility]  [phone]

## 2023-05-02 NOTE — PROGRESS NOTE ADULT - SUBJECTIVE AND OBJECTIVE BOX
NEPHROLOGY     Patient seen and examined sitting on chair, no new complaints, comfortable on NC, denies pain, in no acute distress.     MEDICATIONS  (STANDING):  albuterol/ipratropium for Nebulization 3 milliLiter(s) Nebulizer every 6 hours  aspirin  chewable 81 milliGRAM(s) Oral daily  atorvastatin 40 milliGRAM(s) Oral at bedtime  buMETAnide 2 milliGRAM(s) Oral daily  chlorhexidine 2% Cloths 1 Application(s) Topical <User Schedule>  dextrose 5%. 1000 milliLiter(s) (50 mL/Hr) IV Continuous <Continuous>  dextrose 5%. 1000 milliLiter(s) (100 mL/Hr) IV Continuous <Continuous>  dextrose 50% Injectable 25 Gram(s) IV Push once  dextrose 50% Injectable 12.5 Gram(s) IV Push once  dextrose 50% Injectable 25 Gram(s) IV Push once  ferrous    sulfate 325 milliGRAM(s) Oral daily  gabapentin 200 milliGRAM(s) Oral daily  glucagon  Injectable 1 milliGRAM(s) IntraMuscular once  heparin  Infusion. 1200 Unit(s)/Hr (12 mL/Hr) IV Continuous <Continuous>  insulin glargine Injectable (LANTUS) 7 Unit(s) SubCutaneous at bedtime  insulin lispro (ADMELOG) corrective regimen sliding scale   SubCutaneous three times a day before meals  insulin lispro (ADMELOG) corrective regimen sliding scale   SubCutaneous at bedtime  insulin lispro Injectable (ADMELOG) 4 Unit(s) SubCutaneous three times a day before meals  metoprolol tartrate 100 milliGRAM(s) Oral every 12 hours  Nephro-ashley 1 Tablet(s) Oral daily  pantoprazole    Tablet 40 milliGRAM(s) Oral before breakfast  polyethylene glycol 3350 17 Gram(s) Oral daily  senna 2 Tablet(s) Oral at bedtime  sodium bicarbonate 650 milliGRAM(s) Oral daily    VITALS:  T(C): , Max: 36.5 (05-02-23 @ 04:28)  T(F): , Max: 97.7 (05-02-23 @ 04:28)  HR: 76 (05-02-23 @ 13:06)  BP: 162/84 (05-02-23 @ 13:06)  RR: 16 (05-02-23 @ 13:06)  SpO2: 93% (05-02-23 @ 13:06)    I and O's:    05-01 @ 07:01  -  05-02 @ 07:00  --------------------------------------------------------  IN: 402 mL / OUT: 400 mL / NET: 2 mL    PHYSICAL EXAM:  General: Alerted, oriented  HEENT: MMM  Lungs:CTA-b/l  Heart: RRR S1S2  Abdomen: Soft, NTND  Ext: trace  edema b/l  : no chen    LABS:                        8.6    9.55  )-----------( 306      ( 02 May 2023 02:47 )             29.4     05-02    139  |  95<L>  |  30<H>  ----------------------------<  213<H>  4.2   |  35<H>  |  1.66<H>    Ca    9.5      02 May 2023 02:47  Phos  3.4     05-02  Mg     1.7     05-02    TPro  6.9  /  Alb  3.5  /  TBili  0.3  /  DBili  x   /  AST  21  /  ALT  19  /  AlkPhos  102  05-02    RADIOLOGY & ADDITIONAL STUDIES:    ACC: [id number] EXAM:  CT CHEST   ORDERED BY: KASIA NOEL     PROCEDURE DATE:  05/01/2023      INTERPRETATION:  CLINICAL INFORMATION: Heart failure exacerbation.   Hypoxia.    COMPARISON: CT chest from 3/24/2023, 10/27/2015.    CONTRAST/COMPLICATIONS:  IV Contrast: None.  Oral Contrast: None.  Complications: None documented.    PROCEDURE:  CT scan of the chest was obtained without intravenous contrast.    FINDINGS:    LYMPH NODES: A right lower paratracheal lymph node measures 1.5 cm,   unchanged from 2015.    HEART/VASCULATURE: Cardiomegaly. No pericardial effusion. The aorta is   normal in caliber. Status post CABG. Aortic and aortic root   calcifications.    AIRWAYS/LUNGS/PLEURA: Patent central airways. No endobronchial lesion.   Mosaic attenuation the lungs. New bilateral solid nodules including:  --4 mm right upper lobe nodule (series 3 image 27).  --5, 6 mm right lower lobe nodules (series 3 images 68, 70)  --Multiple smaller than 4 mm nodules within left upper lobe.    Small bilateral calcified nodules are unchanged. Small bilateral pleural   effusions not significantly changed from 3/24/2023 exam.    UPPER ABDOMEN: Cholelithiasis. 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 CBD stent partially visualized    BONES/SOFT TISSUES: Degenerative changes. Status post sternotomy, right   hip total arthroplasty, right mastectomy and axillary surgical clips.    IMPRESSION:  Small pleural effusions and mosaic attenuation of the lungs suggestive of   fluidoverload are not significantly changed from 2/3/2023.    New nonspecific 6 mm and smaller nodules. Recommend follow-up chest CT in   1-3 months to determine the stability.    --- End of Report ---     JUSTINE LAWSON MD; Resident Radiologist  This document has been electronically signed.  VERNELL LEAL MD; Attending Radiologist  This document has been electronically signed. May  2 2023 10:42AM

## 2023-05-02 NOTE — PROGRESS NOTE ADULT - SUBJECTIVE AND OBJECTIVE BOX
Patient is a 83y old  Female who presents with a chief complaint of Shortness of breath (01 May 2023 12:16)      SUBJECTIVE / OVERNIGHT EVENTS:    MEDICATIONS  (STANDING):  albuterol/ipratropium for Nebulization 3 milliLiter(s) Nebulizer every 6 hours  aspirin  chewable 81 milliGRAM(s) Oral daily  atorvastatin 40 milliGRAM(s) Oral at bedtime  buMETAnide 2 milliGRAM(s) Oral daily  chlorhexidine 2% Cloths 1 Application(s) Topical <User Schedule>  dextrose 5%. 1000 milliLiter(s) (100 mL/Hr) IV Continuous <Continuous>  dextrose 5%. 1000 milliLiter(s) (50 mL/Hr) IV Continuous <Continuous>  dextrose 50% Injectable 25 Gram(s) IV Push once  dextrose 50% Injectable 12.5 Gram(s) IV Push once  dextrose 50% Injectable 25 Gram(s) IV Push once  ferrous    sulfate 325 milliGRAM(s) Oral daily  gabapentin 200 milliGRAM(s) Oral daily  glucagon  Injectable 1 milliGRAM(s) IntraMuscular once  heparin  Infusion. 1200 Unit(s)/Hr (12 mL/Hr) IV Continuous <Continuous>  insulin glargine Injectable (LANTUS) 7 Unit(s) SubCutaneous at bedtime  insulin lispro (ADMELOG) corrective regimen sliding scale   SubCutaneous three times a day before meals  insulin lispro (ADMELOG) corrective regimen sliding scale   SubCutaneous at bedtime  insulin lispro Injectable (ADMELOG) 4 Unit(s) SubCutaneous three times a day before meals  metoprolol tartrate 100 milliGRAM(s) Oral every 12 hours  Nephro-ashley 1 Tablet(s) Oral daily  pantoprazole    Tablet 40 milliGRAM(s) Oral before breakfast  polyethylene glycol 3350 17 Gram(s) Oral daily  senna 2 Tablet(s) Oral at bedtime  sodium bicarbonate 650 milliGRAM(s) Oral daily    MEDICATIONS  (PRN):  acetaminophen     Tablet .. 650 milliGRAM(s) Oral every 6 hours PRN Temp greater or equal to 38C (100.4F), Mild Pain (1 - 3)  dextrose Oral Gel 15 Gram(s) Oral once PRN Blood Glucose LESS THAN 70 milliGRAM(s)/deciliter  melatonin 3 milliGRAM(s) Oral at bedtime PRN Insomnia      CAPILLARY BLOOD GLUCOSE      POCT Blood Glucose.: 198 mg/dL (01 May 2023 21:05)  POCT Blood Glucose.: 275 mg/dL (01 May 2023 17:12)  POCT Blood Glucose.: 258 mg/dL (01 May 2023 11:40)  POCT Blood Glucose.: 242 mg/dL (01 May 2023 07:42)    I&O's Summary    01 May 2023 07:01  -  02 May 2023 07:00  --------------------------------------------------------  IN: 402 mL / OUT: 400 mL / NET: 2 mL        Vital Signs Last 24 Hrs  T(C): 36.5 (02 May 2023 04:28), Max: 36.5 (01 May 2023 12:19)  T(F): 97.7 (02 May 2023 04:28), Max: 97.7 (01 May 2023 12:19)  HR: 78 (02 May 2023 04:28) (63 - 78)  BP: 119/72 (02 May 2023 04:28) (110/85 - 152/76)  BP(mean): --  RR: 18 (02 May 2023 04:28) (18 - 18)  SpO2: 91% (02 May 2023 04:28) (80% - 97%)    Parameters below as of 02 May 2023 04:28  Patient On (Oxygen Delivery Method): nasal cannula  O2 Flow (L/min): 1      PHYSICAL EXAM:  GENERAL: Sitting in bed in no acute distress, wearing 2L NC  EYES: Conjunctiva noninjected or pale, sclera anicteric  HENT: NC/AT, moist mucous membranes  NECK: Supple, trachea midline  LUNG: CTAB  CV: RRR, Pulses- Radial: 2+ b/l.   ABDOMEN: Nondistended, nontender  MSK: No visible deformities, nontender extremities. R arm with chronic lymphedema, no pitting edema noted in any other extremity.   SKIN: No rashes, bruises  NEURO: AAOx4 (to person, place, time, event), no tremor      LABS:                        8.6    9.55  )-----------( 306      ( 02 May 2023 02:47 )             29.4      05-02    139  |  95<L>  |  30<H>  ----------------------------<  213<H>  4.2   |  35<H>  |  1.66<H>    Ca    9.5      02 May 2023 02:47  Phos  3.4     05-02  Mg     1.7     05-02    TPro  6.9  /  Alb  3.5  /  TBili  0.3  /  DBili  x   /  AST  21  /  ALT  19  /  AlkPhos  102  05-02    PT/INR - ( 01 May 2023 05:27 )   PT: 12.3 sec;   INR: 1.07 ratio         PTT - ( 02 May 2023 02:47 )  PTT:61.2 sec          RADIOLOGY & ADDITIONAL TESTS:    Imaging Personally Reviewed:    Consultant(s) Notes Reviewed:      Care Discussed with Consultants/Other Providers:   Patient is a 83y old  Female who presents with a chief complaint of Shortness of breath (01 May 2023 12:16)      SUBJECTIVE / OVERNIGHT EVENTS: No acute events overnight. Patient seen and examined at bedside, denies new complaints, feels breathing is about the same as yesterday.     MEDICATIONS  (STANDING):  albuterol/ipratropium for Nebulization 3 milliLiter(s) Nebulizer every 6 hours  aspirin  chewable 81 milliGRAM(s) Oral daily  atorvastatin 40 milliGRAM(s) Oral at bedtime  buMETAnide 2 milliGRAM(s) Oral daily  chlorhexidine 2% Cloths 1 Application(s) Topical <User Schedule>  dextrose 5%. 1000 milliLiter(s) (100 mL/Hr) IV Continuous <Continuous>  dextrose 5%. 1000 milliLiter(s) (50 mL/Hr) IV Continuous <Continuous>  dextrose 50% Injectable 25 Gram(s) IV Push once  dextrose 50% Injectable 12.5 Gram(s) IV Push once  dextrose 50% Injectable 25 Gram(s) IV Push once  ferrous    sulfate 325 milliGRAM(s) Oral daily  gabapentin 200 milliGRAM(s) Oral daily  glucagon  Injectable 1 milliGRAM(s) IntraMuscular once  heparin  Infusion. 1200 Unit(s)/Hr (12 mL/Hr) IV Continuous <Continuous>  insulin glargine Injectable (LANTUS) 7 Unit(s) SubCutaneous at bedtime  insulin lispro (ADMELOG) corrective regimen sliding scale   SubCutaneous three times a day before meals  insulin lispro (ADMELOG) corrective regimen sliding scale   SubCutaneous at bedtime  insulin lispro Injectable (ADMELOG) 4 Unit(s) SubCutaneous three times a day before meals  metoprolol tartrate 100 milliGRAM(s) Oral every 12 hours  Nephro-ashley 1 Tablet(s) Oral daily  pantoprazole    Tablet 40 milliGRAM(s) Oral before breakfast  polyethylene glycol 3350 17 Gram(s) Oral daily  senna 2 Tablet(s) Oral at bedtime  sodium bicarbonate 650 milliGRAM(s) Oral daily    MEDICATIONS  (PRN):  acetaminophen     Tablet .. 650 milliGRAM(s) Oral every 6 hours PRN Temp greater or equal to 38C (100.4F), Mild Pain (1 - 3)  dextrose Oral Gel 15 Gram(s) Oral once PRN Blood Glucose LESS THAN 70 milliGRAM(s)/deciliter  melatonin 3 milliGRAM(s) Oral at bedtime PRN Insomnia      CAPILLARY BLOOD GLUCOSE      POCT Blood Glucose.: 198 mg/dL (01 May 2023 21:05)  POCT Blood Glucose.: 275 mg/dL (01 May 2023 17:12)  POCT Blood Glucose.: 258 mg/dL (01 May 2023 11:40)  POCT Blood Glucose.: 242 mg/dL (01 May 2023 07:42)    I&O's Summary    01 May 2023 07:01  -  02 May 2023 07:00  --------------------------------------------------------  IN: 402 mL / OUT: 400 mL / NET: 2 mL        Vital Signs Last 24 Hrs  T(C): 36.5 (02 May 2023 04:28), Max: 36.5 (01 May 2023 12:19)  T(F): 97.7 (02 May 2023 04:28), Max: 97.7 (01 May 2023 12:19)  HR: 78 (02 May 2023 04:28) (63 - 78)  BP: 119/72 (02 May 2023 04:28) (110/85 - 152/76)  BP(mean): --  RR: 18 (02 May 2023 04:28) (18 - 18)  SpO2: 91% (02 May 2023 04:28) (80% - 97%)    Parameters below as of 02 May 2023 04:28  Patient On (Oxygen Delivery Method): nasal cannula  O2 Flow (L/min): 1      PHYSICAL EXAM:  GENERAL: Sitting in bed in no acute distress, wearing 1L NC  EYES: Conjunctiva noninjected or pale, sclera anicteric  HENT: NC/AT, moist mucous membranes  NECK: Supple, trachea midline  LUNG: CTAB  CV: RRR, Pulses- Radial: 2+ b/l.   ABDOMEN: Nondistended, nontender  MSK: No visible deformities, nontender extremities. R arm with chronic lymphedema, no pitting edema noted in any other extremity.   SKIN: No rashes, bruises  NEURO: AAOx4 (to person, place, time, event), no tremor      LABS:                        8.6    9.55  )-----------( 306      ( 02 May 2023 02:47 )             29.4      05-02    139  |  95<L>  |  30<H>  ----------------------------<  213<H>  4.2   |  35<H>  |  1.66<H>    Ca    9.5      02 May 2023 02:47  Phos  3.4     05-02  Mg     1.7     05-02    TPro  6.9  /  Alb  3.5  /  TBili  0.3  /  DBili  x   /  AST  21  /  ALT  19  /  AlkPhos  102  05-02    PT/INR - ( 01 May 2023 05:27 )   PT: 12.3 sec;   INR: 1.07 ratio         PTT - ( 02 May 2023 02:47 )  PTT:61.2 sec          RADIOLOGY & ADDITIONAL TESTS:    Imaging Personally Reviewed:    Consultant(s) Notes Reviewed:      Care Discussed with Consultants/Other Providers:

## 2023-05-02 NOTE — CHART NOTE - NSCHARTNOTEFT_GEN_A_CORE
Brief GI F/u Note    Pt remains on O2, with dyspnea sitting in chair   Clinical picture c/w on-going acute CHF exacerbation  Discussed care with cardiology team, currently not optimized for procedure   Will consider consider ERCP for stent removal/exchange once medically optimized (can also take place as an outpatient)        Thank you for involving us in the care of this patient, please reach out if any further questions.     Francisco Javier Perez MD  Gastroenterology/Hepatology Fellow, PGY6    Available on [employer] Teams  [phone] ([facility])  [id number] ([facility])  Please contact on call fellow weekdays after 5pm-7am and weekends: [phone].

## 2023-05-02 NOTE — PROGRESS NOTE ADULT - SUBJECTIVE AND OBJECTIVE BOX
DATE OF SERVICE: 05-02-23 @ 09:26    Patient is a 83y old  Female who presents with a chief complaint of Shortness of breath (02 May 2023 07:09)      INTERVAL HISTORY: Feels ok.     REVIEW OF SYSTEMS:  CONSTITUTIONAL: No weakness  EYES/ENT: No visual changes;  No throat pain   NECK: No pain or stiffness  RESPIRATORY: No cough, wheezing; No shortness of breath  CARDIOVASCULAR: No chest pain or palpitations  GASTROINTESTINAL: No abdominal  pain. No nausea, vomiting, or hematemesis  GENITOURINARY: No dysuria, frequency or hematuria  NEUROLOGICAL: No stroke like symptoms  SKIN: No rashes    TELEMETRY Personally reviewed: SR 60-70 PVC/PAC  	  MEDICATIONS:  buMETAnide 2 milliGRAM(s) Oral daily  metoprolol tartrate 100 milliGRAM(s) Oral every 12 hours        PHYSICAL EXAM:  T(C): 36.5 (05-02-23 @ 04:28), Max: 36.5 (05-01-23 @ 12:19)  HR: 78 (05-02-23 @ 04:28) (63 - 78)  BP: 119/72 (05-02-23 @ 04:28) (110/85 - 152/76)  RR: 18 (05-02-23 @ 04:28) (18 - 18)  SpO2: 91% (05-02-23 @ 04:28) (80% - 97%)  Wt(kg): --  I&O's Summary    01 May 2023 07:01  -  02 May 2023 07:00  --------------------------------------------------------  IN: 402 mL / OUT: 400 mL / NET: 2 mL          Appearance: In no distress	  HEENT:    PERRL, EOMI	  Cardiovascular:  S1 S2, No JVD  Respiratory: B/L basilar crackles	  Gastrointestinal:  Soft, Non-tender, + BS	  Vascularature:  No edema of LE  Psychiatric: Appropriate affect   Neuro: no acute focal deficits                               8.6    9.55  )-----------( 306      ( 02 May 2023 02:47 )             29.4     05-02    139  |  95<L>  |  30<H>  ----------------------------<  213<H>  4.2   |  35<H>  |  1.66<H>    Ca    9.5      02 May 2023 02:47  Phos  3.4     05-02  Mg     1.7     05-02    TPro  6.9  /  Alb  3.5  /  TBili  0.3  /  DBili  x   /  AST  21  /  ALT  19  /  AlkPhos  102  05-02        Labs personally reviewed      ASSESSMENT/PLAN: 	  Ms. Batista is a 82 YO woman with PMH of HTN, HLD, DM2, HFpEF (EF 65%), CAD s/p CABG, Breast CA s/p R partial mastectomy, rectal CA s/p resection with recent admission for acute rissa s/p ERCP w/ stent c/b gallbladder perf w/ perc rissa and recurrent SVT, presenting for shortness of breath. Denies CP, palpitations, orthopnea or syncope. Follows as OP with Dr. Hagan.    Problem/Plan -1  Problem: Acute on Chronic Diastolic CHF  - POCUS suggestive of pulm edema  - CXR with no pulm edema or effusion. Prior CT one month ago with moderate effusions and pulm edema  - proBNP 2505  - Prior TTE 3/23 shows preserved EF, hyperdynamic LV function, basal inferoseptum hypokinesis, decreased RV systolic function severe pulm pressures, B/L pleural effusions  - c/w Bumex 2mg PO Daily with close attention to Cr/BUN/Bicarb  - repeat proBNP slightly higher at 2663   - Strict I&Os, daily weights  - Wean supplemental oxygen as tolerated, hypoxic to 87% on RA    Problem/Plan -2  Problem: CAD   - s/p CABG 2020  - ECG with no ischemia noted  - Trop 21  - c/w ASA, metoprolol and statin  - Prior TTE 3/23 shows preserved EF, hyperdynamic LV function, basal inferoseptal hypokinesis, decreased RV systolic function severe pulm pressures     Problem/Plan -3  Problem: hx of Recurrent SVT  - c/w Metoprolol 100mg PO BID  - cont to monitor on tele  - -150 on tele for 3.5 secs on 5/1.  C/w current BB    Problem/Plan -4  Problem: Atrial Fibrillation  - Patient with n/o A-flutter on previous admission  - c/w Metoprolol   - eliquis 2.5mg PO BID on hold   - c/w Heparin gtt      Problem/Plan- 5  Problem: Hx of Cholecystitis  - ECRCP on hold pending improvement in respiratory function            FABIOLA Smith-NP   Sergey Winchetser DO [facility]  Cardiovascular Medicine  [address] [place] [address], [address]  Available through call or text on [employer] TE[employer]  Office: [phone]   DATE OF SERVICE: 05-02-23 @ 09:26    Patient is a 83y old  Female who presents with a chief complaint of Shortness of breath (02 May 2023 07:09)      INTERVAL HISTORY: Feels ok.     REVIEW OF SYSTEMS:  CONSTITUTIONAL: No weakness  EYES/ENT: No visual changes;  No throat pain   NECK: No pain or stiffness  RESPIRATORY: No cough, wheezing; No shortness of breath  CARDIOVASCULAR: No chest pain or palpitations  GASTROINTESTINAL: No abdominal  pain. No nausea, vomiting, or hematemesis  GENITOURINARY: No dysuria, frequency or hematuria  NEUROLOGICAL: No stroke like symptoms  SKIN: No rashes    TELEMETRY Personally reviewed: SR 60-70 PVC/PAC  	  MEDICATIONS:  buMETAnide 2 milliGRAM(s) Oral daily  metoprolol tartrate 100 milliGRAM(s) Oral every 12 hours        PHYSICAL EXAM:  T(C): 36.5 (05-02-23 @ 04:28), Max: 36.5 (05-01-23 @ 12:19)  HR: 78 (05-02-23 @ 04:28) (63 - 78)  BP: 119/72 (05-02-23 @ 04:28) (110/85 - 152/76)  RR: 18 (05-02-23 @ 04:28) (18 - 18)  SpO2: 91% (05-02-23 @ 04:28) (80% - 97%)  Wt(kg): --  I&O's Summary    01 May 2023 07:01  -  02 May 2023 07:00  --------------------------------------------------------  IN: 402 mL / OUT: 400 mL / NET: 2 mL          Appearance: In no distress	  HEENT:    PERRL, EOMI	  Cardiovascular:  S1 S2, No JVD  Respiratory: B/L basilar crackles	  Gastrointestinal:  Soft, Non-tender, + BS	  Vascularature:  No edema of LE  Psychiatric: Appropriate affect   Neuro: no acute focal deficits                               8.6    9.55  )-----------( 306      ( 02 May 2023 02:47 )             29.4     05-02    139  |  95<L>  |  30<H>  ----------------------------<  213<H>  4.2   |  35<H>  |  1.66<H>    Ca    9.5      02 May 2023 02:47  Phos  3.4     05-02  Mg     1.7     05-02    TPro  6.9  /  Alb  3.5  /  TBili  0.3  /  DBili  x   /  AST  21  /  ALT  19  /  AlkPhos  102  05-02        Labs personally reviewed      ASSESSMENT/PLAN: 	  Ms. Batista is a 82 YO woman with PMH of HTN, HLD, DM2, HFpEF (EF 65%), CAD s/p CABG, Breast CA s/p R partial mastectomy, rectal CA s/p resection with recent admission for acute rissa s/p ERCP w/ stent c/b gallbladder perf w/ perc rissa and recurrent SVT, presenting for shortness of breath. Denies CP, palpitations, orthopnea or syncope. Follows as OP with Dr. Hagan.    Problem/Plan -1  Problem: Acute on Chronic Diastolic CHF  - POCUS suggestive of pulm edema  - CXR with no pulm edema or effusion. Prior CT one month ago with moderate effusions and pulm edema  - proBNP 2505  - Prior TTE 3/23 shows preserved EF, hyperdynamic LV function, basal inferoseptum hypokinesis, decreased RV systolic function severe pulm pressures, B/L pleural effusions  - c/w Bumex 2mg PO Daily with close attention to Cr/BUN/Bicarb  - repeat proBNP slightly higher at 2663   - Strict I&Os, daily weights  - Wean supplemental oxygen as tolerated, hypoxic to 87% on RA  - Weight stable, BNP slightly up from admission, still hypoxic on RA and with B/L crackles. Will give an additional dose of Bumex 1mg IV once this evening.     Problem/Plan -2  Problem: CAD   - s/p CABG 2020  - ECG with no ischemia noted  - Trop 21  - c/w ASA, metoprolol and statin  - Prior TTE 3/23 shows preserved EF, hyperdynamic LV function, basal inferoseptal hypokinesis, decreased RV systolic function severe pulm pressures     Problem/Plan -3  Problem: hx of Recurrent SVT  - c/w Metoprolol 100mg PO BID  - cont to monitor on tele  - -150 on tele for 3.5 secs on 5/1.  C/w current BB    Problem/Plan -4  Problem: Atrial Fibrillation  - Patient with n/o A-flutter on previous admission  - c/w Metoprolol   - eliquis 2.5mg PO BID on hold   - c/w Heparin gtt      Problem/Plan- 5  Problem: Hx of Cholecystitis  - ECRCP on hold pending improvement in respiratory function            Delaney Vasquez, FABIOLA-NP   Sergey Winchester,  [facility]  Cardiovascular Medicine  [address] [place] [address], [address]  Available through call or text on [employer] TE[employer]  Office: [phone]   DATE OF SERVICE: 05-02-23 @ 09:26    Patient is a 83y old  Female who presents with a chief complaint of Shortness of breath (02 May 2023 07:09)      INTERVAL HISTORY: Feels ok.     REVIEW OF SYSTEMS:  CONSTITUTIONAL: No weakness  EYES/ENT: No visual changes;  No throat pain   NECK: No pain or stiffness  RESPIRATORY: No cough, wheezing; No shortness of breath  CARDIOVASCULAR: No chest pain or palpitations  GASTROINTESTINAL: No abdominal  pain. No nausea, vomiting, or hematemesis  GENITOURINARY: No dysuria, frequency or hematuria  NEUROLOGICAL: No stroke like symptoms  SKIN: No rashes    TELEMETRY Personally reviewed: SR 60-70 PVC/PAC  	  MEDICATIONS:  buMETAnide 2 milliGRAM(s) Oral daily  metoprolol tartrate 100 milliGRAM(s) Oral every 12 hours        PHYSICAL EXAM:  T(C): 36.5 (05-02-23 @ 04:28), Max: 36.5 (05-01-23 @ 12:19)  HR: 78 (05-02-23 @ 04:28) (63 - 78)  BP: 119/72 (05-02-23 @ 04:28) (110/85 - 152/76)  RR: 18 (05-02-23 @ 04:28) (18 - 18)  SpO2: 91% (05-02-23 @ 04:28) (80% - 97%)  Wt(kg): --  I&O's Summary    01 May 2023 07:01  -  02 May 2023 07:00  --------------------------------------------------------  IN: 402 mL / OUT: 400 mL / NET: 2 mL          Appearance: In no distress	  HEENT:    PERRL, EOMI	  Cardiovascular:  S1 S2, No JVD  Respiratory: B/L basilar crackles	  Gastrointestinal:  Soft, Non-tender, + BS	  Vascularature:  No edema of LE  Psychiatric: Appropriate affect   Neuro: no acute focal deficits                               8.6    9.55  )-----------( 306      ( 02 May 2023 02:47 )             29.4     05-02    139  |  95<L>  |  30<H>  ----------------------------<  213<H>  4.2   |  35<H>  |  1.66<H>    Ca    9.5      02 May 2023 02:47  Phos  3.4     05-02  Mg     1.7     05-02    TPro  6.9  /  Alb  3.5  /  TBili  0.3  /  DBili  x   /  AST  21  /  ALT  19  /  AlkPhos  102  05-02        Labs personally reviewed      ASSESSMENT/PLAN: 	  Ms. Batista is a 82 YO woman with PMH of HTN, HLD, DM2, HFpEF (EF 65%), CAD s/p CABG, Breast CA s/p R partial mastectomy, rectal CA s/p resection with recent admission for acute rissa s/p ERCP w/ stent c/b gallbladder perf w/ perc rissa and recurrent SVT, presenting for shortness of breath. Denies CP, palpitations, orthopnea or syncope. Follows as OP with Dr. Hagan.    Problem/Plan -1  Problem: Acute on Chronic Diastolic CHF  - POCUS suggestive of pulm edema  - CXR with no pulm edema or effusion. Prior CT one month ago with moderate effusions and pulm edema  - proBNP 2505  - Prior TTE 3/23 shows preserved EF, hyperdynamic LV function, basal inferoseptum hypokinesis, decreased RV systolic function severe pulm pressures, B/L pleural effusions  - repeat proBNP slightly higher at 2663   - Strict I&Os, daily weights  - Wean supplemental oxygen as tolerated, hypoxic to 87% on RA  - Weight stable, BNP slightly up from admission, still hypoxic on RA and with B/L crackles. Will give an additional dose of Bumex 1mg IV once this evening.   - Change Bumex to 1mg IV BID    Problem/Plan -2  Problem: CAD   - s/p CABG 2020  - ECG with no ischemia noted  - Trop 21  - c/w ASA, metoprolol and statin  - Prior TTE 3/23 shows preserved EF, hyperdynamic LV function, basal inferoseptal hypokinesis, decreased RV systolic function severe pulm pressures     Problem/Plan -3  Problem: hx of Recurrent SVT  - c/w Metoprolol 100mg PO BID  - cont to monitor on tele  - -150 on tele for 3.5 secs on 5/1.  C/w current BB    Problem/Plan -4  Problem: Atrial Fibrillation  - Patient with n/o A-flutter on previous admission  - c/w Metoprolol   - eliquis 2.5mg PO BID on hold   - c/w Heparin gtt      Problem/Plan- 5  Problem: Hx of Cholecystitis  - ECRCP on hold pending improvement in respiratory function            Delaney Vasquez, AG-NP   Sergey Winchester DO [facility]  Cardiovascular Medicine  [address] [place] [address], [address]  Available through call or text on [employer] TE[employer]  Office: [phone]

## 2023-05-02 NOTE — PROGRESS NOTE ADULT - PROBLEM SELECTOR PLAN 1
ProBNP 2505, TTE 3/23 with EF 76%, severe pulmonary hypertension, moderate diastolic dysfunction. POCUS done in ED cw pulmonary edema  - Diuresis with Bumex 2 mg PO  - Strict I&Os  - Daily weights  - Will obtain repeat CT chest as no improvement seen ProBNP 2505, TTE 3/23 with EF 76%, severe pulmonary hypertension, moderate diastolic dysfunction. POCUS done in ED cw pulmonary edema  - Diuresis with Bumex 2 mg PO + 1 dose 1 mg IV  - Strict I&Os  - Daily weights  - CT chest with unchanged pulmonary edema/effusions

## 2023-05-03 LAB
ANION GAP SERPL CALC-SCNC: 13 MMOL/L — SIGNIFICANT CHANGE UP (ref 5–17)
APTT BLD: 59.6 SEC — HIGH (ref 27.5–35.5)
BUN SERPL-MCNC: 28 MG/DL — HIGH (ref 7–23)
CALCIUM SERPL-MCNC: 9.6 MG/DL — SIGNIFICANT CHANGE UP (ref 8.4–10.5)
CHLORIDE SERPL-SCNC: 93 MMOL/L — LOW (ref 96–108)
CO2 SERPL-SCNC: 34 MMOL/L — HIGH (ref 22–31)
CREAT SERPL-MCNC: 1.68 MG/DL — HIGH (ref 0.5–1.3)
EGFR: 30 ML/MIN/1.73M2 — LOW
GLUCOSE BLDC GLUCOMTR-MCNC: 219 MG/DL — HIGH (ref 70–99)
GLUCOSE BLDC GLUCOMTR-MCNC: 236 MG/DL — HIGH (ref 70–99)
GLUCOSE BLDC GLUCOMTR-MCNC: 239 MG/DL — HIGH (ref 70–99)
GLUCOSE BLDC GLUCOMTR-MCNC: 274 MG/DL — HIGH (ref 70–99)
GLUCOSE SERPL-MCNC: 201 MG/DL — HIGH (ref 70–99)
HCT VFR BLD CALC: 32.6 % — LOW (ref 34.5–45)
HGB BLD-MCNC: 9.7 G/DL — LOW (ref 11.5–15.5)
MAGNESIUM SERPL-MCNC: 1.8 MG/DL — SIGNIFICANT CHANGE UP (ref 1.6–2.6)
MCHC RBC-ENTMCNC: 27.8 PG — SIGNIFICANT CHANGE UP (ref 27–34)
MCHC RBC-ENTMCNC: 29.8 GM/DL — LOW (ref 32–36)
MCV RBC AUTO: 93.4 FL — SIGNIFICANT CHANGE UP (ref 80–100)
NRBC # BLD: 0 /100 WBCS — SIGNIFICANT CHANGE UP (ref 0–0)
PHOSPHATE SERPL-MCNC: 2.8 MG/DL — SIGNIFICANT CHANGE UP (ref 2.5–4.5)
PLATELET # BLD AUTO: 277 K/UL — SIGNIFICANT CHANGE UP (ref 150–400)
POTASSIUM SERPL-MCNC: 4.3 MMOL/L — SIGNIFICANT CHANGE UP (ref 3.5–5.3)
POTASSIUM SERPL-SCNC: 4.3 MMOL/L — SIGNIFICANT CHANGE UP (ref 3.5–5.3)
RBC # BLD: 3.49 M/UL — LOW (ref 3.8–5.2)
RBC # FLD: 17.1 % — HIGH (ref 10.3–14.5)
SODIUM SERPL-SCNC: 140 MMOL/L — SIGNIFICANT CHANGE UP (ref 135–145)
WBC # BLD: 9.74 K/UL — SIGNIFICANT CHANGE UP (ref 3.8–10.5)
WBC # FLD AUTO: 9.74 K/UL — SIGNIFICANT CHANGE UP (ref 3.8–10.5)

## 2023-05-03 PROCEDURE — 99233 SBSQ HOSP IP/OBS HIGH 50: CPT | Mod: GC

## 2023-05-03 RX ORDER — SPIRONOLACTONE 25 MG/1
12.5 TABLET, FILM COATED ORAL DAILY
Refills: 0 | Status: DISCONTINUED | OUTPATIENT
Start: 2023-05-03 | End: 2023-05-05

## 2023-05-03 RX ORDER — DARBEPOETIN ALFA IN POLYSORBAT 200MCG/0.4
40 PEN INJECTOR (ML) SUBCUTANEOUS
Refills: 0 | Status: DISCONTINUED | OUTPATIENT
Start: 2023-05-03 | End: 2023-05-17

## 2023-05-03 RX ADMIN — Medication 325 MILLIGRAM(S): at 11:43

## 2023-05-03 RX ADMIN — Medication 3 MILLILITER(S): at 11:43

## 2023-05-03 RX ADMIN — Medication 2: at 17:20

## 2023-05-03 RX ADMIN — Medication 100 MILLIGRAM(S): at 17:20

## 2023-05-03 RX ADMIN — Medication 650 MILLIGRAM(S): at 11:43

## 2023-05-03 RX ADMIN — Medication 81 MILLIGRAM(S): at 11:43

## 2023-05-03 RX ADMIN — HEPARIN SODIUM 1200 UNIT(S)/HR: 5000 INJECTION INTRAVENOUS; SUBCUTANEOUS at 19:21

## 2023-05-03 RX ADMIN — Medication 4 UNIT(S): at 17:20

## 2023-05-03 RX ADMIN — Medication 3 MILLILITER(S): at 05:02

## 2023-05-03 RX ADMIN — INSULIN GLARGINE 7 UNIT(S): 100 INJECTION, SOLUTION SUBCUTANEOUS at 21:52

## 2023-05-03 RX ADMIN — GABAPENTIN 200 MILLIGRAM(S): 400 CAPSULE ORAL at 11:43

## 2023-05-03 RX ADMIN — Medication 100 MILLIGRAM(S): at 05:04

## 2023-05-03 RX ADMIN — Medication 3: at 11:42

## 2023-05-03 RX ADMIN — Medication 4 UNIT(S): at 08:07

## 2023-05-03 RX ADMIN — CHLORHEXIDINE GLUCONATE 1 APPLICATION(S): 213 SOLUTION TOPICAL at 05:04

## 2023-05-03 RX ADMIN — SENNA PLUS 2 TABLET(S): 8.6 TABLET ORAL at 21:53

## 2023-05-03 RX ADMIN — Medication 650 MILLIGRAM(S): at 11:44

## 2023-05-03 RX ADMIN — Medication 3 MILLILITER(S): at 17:20

## 2023-05-03 RX ADMIN — Medication 40 MICROGRAM(S): at 11:48

## 2023-05-03 RX ADMIN — HEPARIN SODIUM 1200 UNIT(S)/HR: 5000 INJECTION INTRAVENOUS; SUBCUTANEOUS at 06:51

## 2023-05-03 RX ADMIN — SPIRONOLACTONE 12.5 MILLIGRAM(S): 25 TABLET, FILM COATED ORAL at 17:21

## 2023-05-03 RX ADMIN — BUMETANIDE 1 MILLIGRAM(S): 0.25 INJECTION INTRAMUSCULAR; INTRAVENOUS at 05:05

## 2023-05-03 RX ADMIN — ATORVASTATIN CALCIUM 40 MILLIGRAM(S): 80 TABLET, FILM COATED ORAL at 21:54

## 2023-05-03 RX ADMIN — HEPARIN SODIUM 1200 UNIT(S)/HR: 5000 INJECTION INTRAVENOUS; SUBCUTANEOUS at 06:59

## 2023-05-03 RX ADMIN — PANTOPRAZOLE SODIUM 40 MILLIGRAM(S): 20 TABLET, DELAYED RELEASE ORAL at 05:04

## 2023-05-03 RX ADMIN — Medication 4 UNIT(S): at 11:42

## 2023-05-03 RX ADMIN — Medication 650 MILLIGRAM(S): at 13:29

## 2023-05-03 RX ADMIN — Medication 1 TABLET(S): at 11:43

## 2023-05-03 RX ADMIN — BUMETANIDE 1 MILLIGRAM(S): 0.25 INJECTION INTRAMUSCULAR; INTRAVENOUS at 13:35

## 2023-05-03 RX ADMIN — Medication 2: at 08:07

## 2023-05-03 NOTE — PROGRESS NOTE ADULT - PROBLEM SELECTOR PLAN 1
ProBNP 2505, TTE 3/23 with EF 76%, severe pulmonary hypertension, moderate diastolic dysfunction. POCUS done in ED cw pulmonary edema  - Diuresis with Bumex 2 mg PO + 1 dose 1 mg IV  - Strict I&Os  - Daily weights  - CT chest with unchanged pulmonary edema/effusions ProBNP 2505, TTE 3/23 with EF 76%, severe pulmonary hypertension, moderate diastolic dysfunction. POCUS done in ED cw pulmonary edema  - CT chest with unchanged pulmonary edema/effusions  - Diuresis with Bumex 1 mg IV - per nephro, would benefit from 2 mg BID or adding aldactone 30 mg daily though in the latter case would have concern as low GFR  - Strict I&Os  - Daily weights

## 2023-05-03 NOTE — PROGRESS NOTE ADULT - PROBLEM SELECTOR PLAN 2
- Currently on 2L NC  - Will attempt to wean to NC  - Not on home O2  - Duonebs q6 for wheezes  - Incentive spirometry - Currently on 1L NC  - Will attempt to wean to room air  - Not on home O2  - Duonebs q6 for wheezes  - Incentive spirometry

## 2023-05-03 NOTE — PROGRESS NOTE ADULT - SUBJECTIVE AND OBJECTIVE BOX
Patient is a 83y old  Female who presents with a chief complaint of Shortness of breath (02 May 2023 13:53)      SUBJECTIVE / OVERNIGHT EVENTS:    MEDICATIONS  (STANDING):  albuterol/ipratropium for Nebulization 3 milliLiter(s) Nebulizer every 6 hours  aspirin  chewable 81 milliGRAM(s) Oral daily  atorvastatin 40 milliGRAM(s) Oral at bedtime  buMETAnide Injectable 1 milliGRAM(s) IV Push two times a day  chlorhexidine 2% Cloths 1 Application(s) Topical <User Schedule>  dextrose 5%. 1000 milliLiter(s) (100 mL/Hr) IV Continuous <Continuous>  dextrose 5%. 1000 milliLiter(s) (50 mL/Hr) IV Continuous <Continuous>  dextrose 50% Injectable 25 Gram(s) IV Push once  dextrose 50% Injectable 12.5 Gram(s) IV Push once  dextrose 50% Injectable 25 Gram(s) IV Push once  ferrous    sulfate 325 milliGRAM(s) Oral daily  gabapentin 200 milliGRAM(s) Oral daily  glucagon  Injectable 1 milliGRAM(s) IntraMuscular once  heparin  Infusion. 1200 Unit(s)/Hr (12 mL/Hr) IV Continuous <Continuous>  insulin glargine Injectable (LANTUS) 7 Unit(s) SubCutaneous at bedtime  insulin lispro (ADMELOG) corrective regimen sliding scale   SubCutaneous three times a day before meals  insulin lispro (ADMELOG) corrective regimen sliding scale   SubCutaneous at bedtime  insulin lispro Injectable (ADMELOG) 4 Unit(s) SubCutaneous three times a day before meals  metoprolol tartrate 100 milliGRAM(s) Oral every 12 hours  Nephro-ashley 1 Tablet(s) Oral daily  pantoprazole    Tablet 40 milliGRAM(s) Oral before breakfast  polyethylene glycol 3350 17 Gram(s) Oral daily  senna 2 Tablet(s) Oral at bedtime  sodium bicarbonate 650 milliGRAM(s) Oral daily    MEDICATIONS  (PRN):  acetaminophen     Tablet .. 650 milliGRAM(s) Oral every 6 hours PRN Temp greater or equal to 38C (100.4F), Mild Pain (1 - 3)  dextrose Oral Gel 15 Gram(s) Oral once PRN Blood Glucose LESS THAN 70 milliGRAM(s)/deciliter  melatonin 3 milliGRAM(s) Oral at bedtime PRN Insomnia      CAPILLARY BLOOD GLUCOSE      POCT Blood Glucose.: 301 mg/dL (02 May 2023 21:17)  POCT Blood Glucose.: 242 mg/dL (02 May 2023 17:01)  POCT Blood Glucose.: 228 mg/dL (02 May 2023 11:20)  POCT Blood Glucose.: 227 mg/dL (02 May 2023 07:32)    I&O's Summary    02 May 2023 07:01  -  03 May 2023 07:00  --------------------------------------------------------  IN: 144 mL / OUT: 1500 mL / NET: -1356 mL        Vital Signs Last 24 Hrs  T(C): 36.8 (03 May 2023 04:25), Max: 36.8 (03 May 2023 04:25)  T(F): 98.3 (03 May 2023 04:25), Max: 98.3 (03 May 2023 04:25)  HR: 76 (03 May 2023 04:25) (69 - 88)  BP: 149/84 (03 May 2023 04:25) (114/68 - 162/84)  BP(mean): --  RR: 17 (03 May 2023 04:25) (16 - 17)  SpO2: 100% (03 May 2023 04:25) (93% - 100%)    Parameters below as of 03 May 2023 04:25  Patient On (Oxygen Delivery Method): nasal cannula  O2 Flow (L/min): 1      PHYSICAL EXAM:  GENERAL: Sitting in bed in no acute distress, wearing 1L NC  EYES: Conjunctiva noninjected or pale, sclera anicteric  HENT: NC/AT, moist mucous membranes  NECK: Supple, trachea midline  LUNG: CTAB  CV: RRR, Pulses- Radial: 2+ b/l.   ABDOMEN: Nondistended, nontender  MSK: No visible deformities, nontender extremities. R arm with chronic lymphedema, no pitting edema noted in any other extremity.   SKIN: No rashes, bruises  NEURO: AAOx4 (to person, place, time, event), no tremor    LABS:                        9.7    9.74  )-----------( 277      ( 03 May 2023 06:25 )             32.6      05-02    139  |  95<L>  |  30<H>  ----------------------------<  213<H>  4.2   |  35<H>  |  1.66<H>    Ca    9.5      02 May 2023 02:47  Phos  3.4     05-02  Mg     1.7     05-02    TPro  6.9  /  Alb  3.5  /  TBili  0.3  /  DBili  x   /  AST  21  /  ALT  19  /  AlkPhos  102  05-02    PTT - ( 03 May 2023 06:25 )  PTT:59.6 sec          RADIOLOGY & ADDITIONAL TESTS:    Imaging Personally Reviewed:    Consultant(s) Notes Reviewed:      Care Discussed with Consultants/Other Providers:   Patient is a 83y old  Female who presents with a chief complaint of Shortness of breath (02 May 2023 13:53)      SUBJECTIVE / OVERNIGHT EVENTS: No acute events overnight. Patient seen and examined at bedside, partially wearing nasal cannula. Feels breathing is about the same, no issues toileting or eating.       MEDICATIONS  (STANDING):  albuterol/ipratropium for Nebulization 3 milliLiter(s) Nebulizer every 6 hours  aspirin  chewable 81 milliGRAM(s) Oral daily  atorvastatin 40 milliGRAM(s) Oral at bedtime  buMETAnide Injectable 1 milliGRAM(s) IV Push two times a day  chlorhexidine 2% Cloths 1 Application(s) Topical <User Schedule>  dextrose 5%. 1000 milliLiter(s) (100 mL/Hr) IV Continuous <Continuous>  dextrose 5%. 1000 milliLiter(s) (50 mL/Hr) IV Continuous <Continuous>  dextrose 50% Injectable 25 Gram(s) IV Push once  dextrose 50% Injectable 12.5 Gram(s) IV Push once  dextrose 50% Injectable 25 Gram(s) IV Push once  ferrous    sulfate 325 milliGRAM(s) Oral daily  gabapentin 200 milliGRAM(s) Oral daily  glucagon  Injectable 1 milliGRAM(s) IntraMuscular once  heparin  Infusion. 1200 Unit(s)/Hr (12 mL/Hr) IV Continuous <Continuous>  insulin glargine Injectable (LANTUS) 7 Unit(s) SubCutaneous at bedtime  insulin lispro (ADMELOG) corrective regimen sliding scale   SubCutaneous three times a day before meals  insulin lispro (ADMELOG) corrective regimen sliding scale   SubCutaneous at bedtime  insulin lispro Injectable (ADMELOG) 4 Unit(s) SubCutaneous three times a day before meals  metoprolol tartrate 100 milliGRAM(s) Oral every 12 hours  Nephro-ashley 1 Tablet(s) Oral daily  pantoprazole    Tablet 40 milliGRAM(s) Oral before breakfast  polyethylene glycol 3350 17 Gram(s) Oral daily  senna 2 Tablet(s) Oral at bedtime  sodium bicarbonate 650 milliGRAM(s) Oral daily    MEDICATIONS  (PRN):  acetaminophen     Tablet .. 650 milliGRAM(s) Oral every 6 hours PRN Temp greater or equal to 38C (100.4F), Mild Pain (1 - 3)  dextrose Oral Gel 15 Gram(s) Oral once PRN Blood Glucose LESS THAN 70 milliGRAM(s)/deciliter  melatonin 3 milliGRAM(s) Oral at bedtime PRN Insomnia      CAPILLARY BLOOD GLUCOSE      POCT Blood Glucose.: 301 mg/dL (02 May 2023 21:17)  POCT Blood Glucose.: 242 mg/dL (02 May 2023 17:01)  POCT Blood Glucose.: 228 mg/dL (02 May 2023 11:20)  POCT Blood Glucose.: 227 mg/dL (02 May 2023 07:32)    I&O's Summary    02 May 2023 07:01  -  03 May 2023 07:00  --------------------------------------------------------  IN: 144 mL / OUT: 1500 mL / NET: -1356 mL        Vital Signs Last 24 Hrs  T(C): 36.8 (03 May 2023 04:25), Max: 36.8 (03 May 2023 04:25)  T(F): 98.3 (03 May 2023 04:25), Max: 98.3 (03 May 2023 04:25)  HR: 76 (03 May 2023 04:25) (69 - 88)  BP: 149/84 (03 May 2023 04:25) (114/68 - 162/84)  BP(mean): --  RR: 17 (03 May 2023 04:25) (16 - 17)  SpO2: 100% (03 May 2023 04:25) (93% - 100%)    Parameters below as of 03 May 2023 04:25  Patient On (Oxygen Delivery Method): nasal cannula  O2 Flow (L/min): 1      PHYSICAL EXAM:  GENERAL: Sitting in bed in no acute distress, wearing 1L NC though nasal cannula partially out of nose  EYES: Conjunctiva noninjected or pale, sclera anicteric  HENT: NC/AT, moist mucous membranes  NECK: Supple, trachea midline  LUNG: CTAB  CV: RRR, Pulses- Radial: 2+ b/l.   ABDOMEN: Nondistended, nontender  MSK: No visible deformities, nontender extremities. R arm with chronic lymphedema, no pitting edema noted in any other extremity.   SKIN: No rashes, bruises  NEURO: AAOx4 (to person, place, time, event), no tremor    LABS:                        9.7    9.74  )-----------( 277      ( 03 May 2023 06:25 )             32.6      05-02    139  |  95<L>  |  30<H>  ----------------------------<  213<H>  4.2   |  35<H>  |  1.66<H>    Ca    9.5      02 May 2023 02:47  Phos  3.4     05-02  Mg     1.7     05-02    TPro  6.9  /  Alb  3.5  /  TBili  0.3  /  DBili  x   /  AST  21  /  ALT  19  /  AlkPhos  102  05-02    PTT - ( 03 May 2023 06:25 )  PTT:59.6 sec          RADIOLOGY & ADDITIONAL TESTS:    Imaging Personally Reviewed:    Consultant(s) Notes Reviewed:      Care Discussed with Consultants/Other Providers:

## 2023-05-03 NOTE — PROGRESS NOTE ADULT - SUBJECTIVE AND OBJECTIVE BOX
DATE OF SERVICE: 05-03-23 @ 14:43    Patient is a 83y old  Female who presents with a chief complaint of Shortness of breath (03 May 2023 09:49)      INTERVAL HISTORY: Reports improvement, denies SOB and chest pain    REVIEW OF SYSTEMS:  CONSTITUTIONAL: No weakness  EYES/ENT: No visual changes;  No throat pain   NECK: No pain or stiffness  RESPIRATORY: No cough, wheezing; No shortness of breath  CARDIOVASCULAR: No chest pain or palpitations  GASTROINTESTINAL: No abdominal  pain. No nausea, vomiting, or hematemesis  GENITOURINARY: No dysuria, frequency or hematuria  NEUROLOGICAL: No stroke like symptoms  SKIN: No rashes    TELEMETRY Personally reviewed: SR 60-80s  	  MEDICATIONS:  buMETAnide Injectable 1 milliGRAM(s) IV Push two times a day  metoprolol tartrate 100 milliGRAM(s) Oral every 12 hours        PHYSICAL EXAM:  T(C): 36.6 (05-03-23 @ 11:38), Max: 36.8 (05-03-23 @ 04:25)  HR: 74 (05-03-23 @ 13:33) (68 - 88)  BP: 103/50 (05-03-23 @ 13:33) (103/50 - 149/84)  RR: 19 (05-03-23 @ 13:33) (17 - 19)  SpO2: 91% (05-03-23 @ 13:33) (91% - 100%)  Wt(kg): --  I&O's Summary    02 May 2023 07:01  -  03 May 2023 07:00  --------------------------------------------------------  IN: 144 mL / OUT: 1500 mL / NET: -1356 mL    03 May 2023 07:01  -  03 May 2023 14:43  --------------------------------------------------------  IN: 610 mL / OUT: 500 mL / NET: 110 mL          Appearance: In no distress	  HEENT:    PERRL, EOMI	  Cardiovascular:  S1 S2, No JVD  Respiratory: Lungs clear to auscultation	  Gastrointestinal:  Soft, Non-tender, + BS	  Vascularature:  No edema of LE  Psychiatric: Appropriate affect   Neuro: no acute focal deficits                               9.7    9.74  )-----------( 277      ( 03 May 2023 06:25 )             32.6     05-02    139  |  95<L>  |  30<H>  ----------------------------<  213<H>  4.2   |  35<H>  |  1.66<H>    Ca    9.5      02 May 2023 02:47  Phos  3.4     05-02  Mg     1.7     05-02    TPro  6.9  /  Alb  3.5  /  TBili  0.3  /  DBili  x   /  AST  21  /  ALT  19  /  AlkPhos  102  05-02        Labs personally reviewed      ASSESSMENT/PLAN: 	  Ms. Batista is a 84 YO woman with PMH of HTN, HLD, DM2, HFpEF (EF 65%), CAD s/p CABG, Breast CA s/p R partial mastectomy, rectal CA s/p resection with recent admission for acute rissa s/p ERCP w/ stent c/b gallbladder perf w/ perc rissa and recurrent SVT, presenting for shortness of breath. Denies CP, palpitations, orthopnea or syncope. Follows as OP with Dr. Hagan.    Problem/Plan -1  Problem: Acute on Chronic Diastolic CHF  - POCUS suggestive of pulm edema  - CXR with no pulm edema or effusion. Prior CT one month ago with moderate effusions and pulm edema  - proBNP 2505  - Prior TTE 3/23 shows preserved EF, hyperdynamic LV function, basal inferoseptum hypokinesis, decreased RV systolic function severe pulm pressures, B/L pleural effusions  - repeat proBNP slightly higher at 2663   - Strict I&Os, daily weights  - Wean supplemental oxygen as tolerated, hypoxic to 87% on RA  - Continue Bumex to 1mg IV BID  - Initiate [place] 25mg daily     Problem/Plan -2  Problem: CAD   - s/p CABG 2020  - ECG with no ischemia noted  - Trop 21  - c/w ASA, metoprolol and statin  - Prior TTE 3/23 shows preserved EF, hyperdynamic LV function, basal inferoseptal hypokinesis, decreased RV systolic function severe pulm pressures     Problem/Plan -3  Problem: hx of Recurrent SVT  - c/w Metoprolol 100mg PO BID  - cont to monitor on tele  - -150 on tele for 3.5 secs on 5/1.  C/w current BB    Problem/Plan -4  Problem: Atrial Fibrillation  - Patient with n/o A-flutter on previous admission  - c/w Metoprolol   - eliquis 2.5mg PO BID on hold   - c/w Heparin gtt      Problem/Plan- 5  Problem: Hx of Cholecystitis  - ECRCP on hold pending improvement in respiratory function          NARGIS Smith, DO [facility]  Cardiovascular Medicine  [address] [place] [address], [address]  Available through call or text on [employer]  Office: [phone]

## 2023-05-03 NOTE — PROGRESS NOTE ADULT - PROBLEM SELECTOR PLAN 4
Normocytic anemia - likely 2/2 CKD  - Iron studies consistent with MILLY - will start iron supplementation  - Per nephro, will continue venofer for 5 days  - Bowel regimen Normocytic anemia - likely 2/2 CKD  - Iron studies consistent with MILLY - will start iron supplementation  - Per nephro, started on darbepoitin  - Bowel regimen

## 2023-05-03 NOTE — PROGRESS NOTE ADULT - ASSESSMENT
ASSESSMENT:  Ms. Batista is a 82 YO woman with PMH of HTN, HLD, DM2, HFpEF (EF 65%), CAD s/p CABG, Breast CA s/p R partial mastectomy, rectal CA s/p resection with previous admission for acute rissa s/p ERCP w/ stent c/b gallbladder perf w/ perc rissa and recurrent SVT, and recent admission for CHF exacerbation presenting for shortness of breath.----out patient nephrologist Dr Kan .    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,  nephrology aware , previous diuretic test showed minimal  function of that kidney)  CVS- BP controlled at present  hypervolemic - improving  Electrolytes - controlled  Anemia- -ckd related  also consistent  iron deficiency , tsat 7, ferritin 28---sp iv venofer  5 doses completed on 4/3023   GI--planning for ERCP  met alkalosis --- in the view of diuretic --- cont to monitor     RECOMMENDATIONS  1)Renal:  BMP in process  bumex 2mg daily ,addition 1mg iv was given today , will increase bumex to  2 mg bid   weight fluctuating   176  -178  lbs   keep k ~4 and mag 2, s/p mag ox 400mg po x 1 this am  avoid nephrotoxic medication  dose all medications for GFR ~30-35 ml/min  daily bmp, mag and Po4   renal diet   monitor I/Os  2)CVS: continue BP meds as ordered for now  3)cont daily oral iron   4)  start weekly aranesp 40 mcg SQ today     [facility]  Office: [phone]         ASSESSMENT:  Ms. Batista is a 82 YO woman with PMH of HTN, HLD, DM2, HFpEF (EF 65%), CAD s/p CABG, Breast CA s/p R partial mastectomy, rectal CA s/p resection with previous admission for acute rissa s/p ERCP w/ stent c/b gallbladder perf w/ perc rissa and recurrent SVT, and recent admission for CHF exacerbation presenting for shortness of breath.----out patient nephrologist Dr Kan .    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,  nephrology aware , previous diuretic test showed minimal  function of that kidney)  CVS- BP controlled at present  hypervolemic - improving  Electrolytes - controlled  Anemia- -ckd related  also consistent  iron deficiency , tsat 7, ferritin 28---sp iv venofer  5 doses completed on 4/3023   GI--planning for ERCP  met alkalosis --- in the view of diuretic --- cont to monitor     RECOMMENDATIONS  1)Renal:  BMP in process  bumex 2mg daily ,addition 1mg iv was given today ,   cont bumex 2mg daily  add spironolactone 12.5 mg daily ( anti ryan + diuretic )    weight fluctuating   176  -178  lbs   keep k ~4 and mag 2, s/p mag ox 400mg po x 1 this am  avoid nephrotoxic medication  dose all medications for GFR ~30-35 ml/min  daily bmp, mag and Po4   renal diet   monitor I/Os  2)CVS: continue BP meds as ordered for now  3)cont daily oral iron   4)  start weekly aranesp 40 mcg SQ today     [facility]  Office: [phone]

## 2023-05-03 NOTE — PROGRESS NOTE ADULT - ATTENDING COMMENTS
82 YO woman with PMH of HTN, HLD, DM2, HFpEF (EF 65%), CAD s/p CABG, Breast CA s/p R partial mastectomy, rectal CA s/p resection with recent admission for acute rissa s/p ERCP w/ stent c/b gallbladder perf w/ perc rissa and recurrent SVT, presenting for shortness of breath,     #Acute on chronic CHF exacerbation  #Acute hypoxic respiratory failure   #H/o cholecystitis   #CKD  #DM  #Chronic Afib  #HTN  #Anemia    - presented due to SOB; s/p bumex 2mg IV in ED and placed on BiPAP; appears to be improving overall; no longer on BiPAP > now on 1LNC, wean as tolerated   - bumex changed to 1mg IV BID per cardio recs (patient is on bumex 1mg daily at home)  - strict I/O; daily weight    - GI recs appreciated; original plan for ERCP 4/26 but postponed due to being on oxygen; wean off O2 as tolerated and f/u GI for procedure as inpatient when able to; patient was originally scheduled as outpatient 4/26  - hold eliquis for now for possible procedure; heparin drip for now.  - anemia panel noted; appears iron deficient; iron supplement added on; completed course of IV iron per nephro recs   - bowel regimen.  - PT eval recs outpatient PT    - GOC discussion; DNR/DNI; MOLST drafted and placed in chart   - patient still on O2; CT chest done showing pulm edema still; s/p additional IV bumex on 5/2; f/u cardio regarding further adjustment of meds   - attempt to wean off O2; if not able to be weaned, can obtain ambu sats and see if patient requires home O2 prior to DC  - GI f/u for ERCP while inpatient if possible; daughter would prefer procedure to be done while inpatient     Rest as above. Discussed with HS.

## 2023-05-03 NOTE — PROGRESS NOTE ADULT - SUBJECTIVE AND OBJECTIVE BOX
NEPHROLOGY       no acute distress  on NC     MEDICATIONS  (STANDING):  albuterol/ipratropium for Nebulization 3 milliLiter(s) Nebulizer every 6 hours  aspirin  chewable 81 milliGRAM(s) Oral daily  atorvastatin 40 milliGRAM(s) Oral at bedtime  buMETAnide 2 milliGRAM(s) Oral daily  chlorhexidine 2% Cloths 1 Application(s) Topical <User Schedule>  dextrose 5%. 1000 milliLiter(s) (50 mL/Hr) IV Continuous <Continuous>  dextrose 5%. 1000 milliLiter(s) (100 mL/Hr) IV Continuous <Continuous>  dextrose 50% Injectable 25 Gram(s) IV Push once  dextrose 50% Injectable 12.5 Gram(s) IV Push once  dextrose 50% Injectable 25 Gram(s) IV Push once  ferrous    sulfate 325 milliGRAM(s) Oral daily  gabapentin 200 milliGRAM(s) Oral daily  glucagon  Injectable 1 milliGRAM(s) IntraMuscular once  heparin  Infusion. 1200 Unit(s)/Hr (12 mL/Hr) IV Continuous <Continuous>  insulin glargine Injectable (LANTUS) 7 Unit(s) SubCutaneous at bedtime  insulin lispro (ADMELOG) corrective regimen sliding scale   SubCutaneous three times a day before meals  insulin lispro (ADMELOG) corrective regimen sliding scale   SubCutaneous at bedtime  insulin lispro Injectable (ADMELOG) 4 Unit(s) SubCutaneous three times a day before meals  metoprolol tartrate 100 milliGRAM(s) Oral every 12 hours  Nephro-ashley 1 Tablet(s) Oral daily  pantoprazole    Tablet 40 milliGRAM(s) Oral before breakfast  polyethylene glycol 3350 17 Gram(s) Oral daily  senna 2 Tablet(s) Oral at bedtime  sodium bicarbonate 650 milliGRAM(s) Oral daily    VITALS:  T(C): , Max: 36.5 (05-02-23 @ 04:28)  T(F): , Max: 97.7 (05-02-23 @ 04:28)  HR: 76 (05-02-23 @ 13:06)  BP: 162/84 (05-02-23 @ 13:06)  RR: 16 (05-02-23 @ 13:06)  SpO2: 93% (05-02-23 @ 13:06)    I and O's:    05-01 @ 07:01  -  05-02 @ 07:00  --------------------------------------------------------  IN: 402 mL / OUT: 400 mL / NET: 2 mL    PHYSICAL EXAM:  General: Alerted, oriented  HEENT: MMM  Lungs:CTA-b/l  Heart: RRR S1S2  Abdomen: Soft, NTND  Ext:   edema b/l  : no chen    LABS:                        8.6    9.55  )-----------( 306      ( 02 May 2023 02:47 )             29.4     05-02    139  |  95<L>  |  30<H>  ----------------------------<  213<H>  4.2   |  35<H>  |  1.66<H>    Ca    9.5      02 May 2023 02:47  Phos  3.4     05-02  Mg     1.7     05-02    TPro  6.9  /  Alb  3.5  /  TBili  0.3  /  DBili  x   /  AST  21  /  ALT  19  /  AlkPhos  102  05-02    RADIOLOGY & ADDITIONAL STUDIES:    ACC: [id number] EXAM:  CT CHEST   ORDERED BY: KASIA NOEL     PROCEDURE DATE:  05/01/2023      INTERPRETATION:  CLINICAL INFORMATION: Heart failure exacerbation.   Hypoxia.    COMPARISON: CT chest from 3/24/2023, 10/27/2015.    CONTRAST/COMPLICATIONS:  IV Contrast: None.  Oral Contrast: None.  Complications: None documented.    PROCEDURE:  CT scan of the chest was obtained without intravenous contrast.    FINDINGS:    LYMPH NODES: A right lower paratracheal lymph node measures 1.5 cm,   unchanged from 2015.    HEART/VASCULATURE: Cardiomegaly. No pericardial effusion. The aorta is   normal in caliber. Status post CABG. Aortic and aortic root   calcifications.    AIRWAYS/LUNGS/PLEURA: Patent central airways. No endobronchial lesion.   Mosaic attenuation the lungs. New bilateral solid nodules including:  --4 mm right upper lobe nodule (series 3 image 27).  --5, 6 mm right lower lobe nodules (series 3 images 68, 70)  --Multiple smaller than 4 mm nodules within left upper lobe.    Small bilateral calcified nodules are unchanged. Small bilateral pleural   effusions not significantly changed from 3/24/2023 exam.    UPPER ABDOMEN: Cholelithiasis. 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 CBD stent partially visualized    BONES/SOFT TISSUES: Degenerative changes. Status post sternotomy, right   hip total arthroplasty, right mastectomy and axillary surgical clips.    IMPRESSION:  Small pleural effusions and mosaic attenuation of the lungs suggestive of   fluidoverload are not significantly changed from 2/3/2023.    New nonspecific 6 mm and smaller nodules. Recommend follow-up chest CT in   1-3 months to determine the stability.    --- End of Report ---     JUSTINE LAWSON MD; Resident Radiologist  This document has been electronically signed.  VERNELL LEAL MD; Attending Radiologist  This document has been electronically signed. May  2 2023 10:42AM

## 2023-05-04 LAB
ANION GAP SERPL CALC-SCNC: 12 MMOL/L — SIGNIFICANT CHANGE UP (ref 5–17)
APTT BLD: 83.7 SEC — HIGH (ref 27.5–35.5)
BUN SERPL-MCNC: 29 MG/DL — HIGH (ref 7–23)
CALCIUM SERPL-MCNC: 9.7 MG/DL — SIGNIFICANT CHANGE UP (ref 8.4–10.5)
CHLORIDE SERPL-SCNC: 93 MMOL/L — LOW (ref 96–108)
CO2 SERPL-SCNC: 35 MMOL/L — HIGH (ref 22–31)
CREAT SERPL-MCNC: 1.85 MG/DL — HIGH (ref 0.5–1.3)
EGFR: 26 ML/MIN/1.73M2 — LOW
GLUCOSE BLDC GLUCOMTR-MCNC: 172 MG/DL — HIGH (ref 70–99)
GLUCOSE BLDC GLUCOMTR-MCNC: 192 MG/DL — HIGH (ref 70–99)
GLUCOSE BLDC GLUCOMTR-MCNC: 224 MG/DL — HIGH (ref 70–99)
GLUCOSE BLDC GLUCOMTR-MCNC: 251 MG/DL — HIGH (ref 70–99)
GLUCOSE SERPL-MCNC: 233 MG/DL — HIGH (ref 70–99)
HCT VFR BLD CALC: 33.6 % — LOW (ref 34.5–45)
HGB BLD-MCNC: 10 G/DL — LOW (ref 11.5–15.5)
MAGNESIUM SERPL-MCNC: 1.8 MG/DL — SIGNIFICANT CHANGE UP (ref 1.6–2.6)
MCHC RBC-ENTMCNC: 27.8 PG — SIGNIFICANT CHANGE UP (ref 27–34)
MCHC RBC-ENTMCNC: 29.8 GM/DL — LOW (ref 32–36)
MCV RBC AUTO: 93.3 FL — SIGNIFICANT CHANGE UP (ref 80–100)
NRBC # BLD: 0 /100 WBCS — SIGNIFICANT CHANGE UP (ref 0–0)
PHOSPHATE SERPL-MCNC: 3.3 MG/DL — SIGNIFICANT CHANGE UP (ref 2.5–4.5)
PLATELET # BLD AUTO: 273 K/UL — SIGNIFICANT CHANGE UP (ref 150–400)
POTASSIUM SERPL-MCNC: 4.2 MMOL/L — SIGNIFICANT CHANGE UP (ref 3.5–5.3)
POTASSIUM SERPL-SCNC: 4.2 MMOL/L — SIGNIFICANT CHANGE UP (ref 3.5–5.3)
RBC # BLD: 3.6 M/UL — LOW (ref 3.8–5.2)
RBC # FLD: 17.8 % — HIGH (ref 10.3–14.5)
SODIUM SERPL-SCNC: 140 MMOL/L — SIGNIFICANT CHANGE UP (ref 135–145)
WBC # BLD: 10.01 K/UL — SIGNIFICANT CHANGE UP (ref 3.8–10.5)
WBC # FLD AUTO: 10.01 K/UL — SIGNIFICANT CHANGE UP (ref 3.8–10.5)

## 2023-05-04 PROCEDURE — 99233 SBSQ HOSP IP/OBS HIGH 50: CPT | Mod: GC

## 2023-05-04 RX ORDER — INSULIN GLARGINE 100 [IU]/ML
10 INJECTION, SOLUTION SUBCUTANEOUS AT BEDTIME
Refills: 0 | Status: DISCONTINUED | OUTPATIENT
Start: 2023-05-04 | End: 2023-05-09

## 2023-05-04 RX ORDER — BUMETANIDE 0.25 MG/ML
1 INJECTION INTRAMUSCULAR; INTRAVENOUS ONCE
Refills: 0 | Status: COMPLETED | OUTPATIENT
Start: 2023-05-04 | End: 2023-05-04

## 2023-05-04 RX ORDER — INSULIN LISPRO 100/ML
5 VIAL (ML) SUBCUTANEOUS
Refills: 0 | Status: DISCONTINUED | OUTPATIENT
Start: 2023-05-04 | End: 2023-05-09

## 2023-05-04 RX ADMIN — Medication 1: at 12:18

## 2023-05-04 RX ADMIN — INSULIN GLARGINE 10 UNIT(S): 100 INJECTION, SOLUTION SUBCUTANEOUS at 21:21

## 2023-05-04 RX ADMIN — Medication 650 MILLIGRAM(S): at 21:23

## 2023-05-04 RX ADMIN — Medication 5 UNIT(S): at 17:34

## 2023-05-04 RX ADMIN — BUMETANIDE 1 MILLIGRAM(S): 0.25 INJECTION INTRAMUSCULAR; INTRAVENOUS at 17:32

## 2023-05-04 RX ADMIN — Medication 1: at 17:33

## 2023-05-04 RX ADMIN — Medication 3 MILLILITER(S): at 05:46

## 2023-05-04 RX ADMIN — BUMETANIDE 1 MILLIGRAM(S): 0.25 INJECTION INTRAMUSCULAR; INTRAVENOUS at 05:46

## 2023-05-04 RX ADMIN — PANTOPRAZOLE SODIUM 40 MILLIGRAM(S): 20 TABLET, DELAYED RELEASE ORAL at 05:45

## 2023-05-04 RX ADMIN — Medication 4 UNIT(S): at 08:01

## 2023-05-04 RX ADMIN — Medication 81 MILLIGRAM(S): at 11:19

## 2023-05-04 RX ADMIN — HEPARIN SODIUM 1200 UNIT(S)/HR: 5000 INJECTION INTRAVENOUS; SUBCUTANEOUS at 19:01

## 2023-05-04 RX ADMIN — GABAPENTIN 200 MILLIGRAM(S): 400 CAPSULE ORAL at 11:20

## 2023-05-04 RX ADMIN — Medication 5 UNIT(S): at 12:18

## 2023-05-04 RX ADMIN — Medication 2: at 08:01

## 2023-05-04 RX ADMIN — Medication 100 MILLIGRAM(S): at 05:45

## 2023-05-04 RX ADMIN — Medication 650 MILLIGRAM(S): at 11:20

## 2023-05-04 RX ADMIN — Medication 325 MILLIGRAM(S): at 11:20

## 2023-05-04 RX ADMIN — SENNA PLUS 2 TABLET(S): 8.6 TABLET ORAL at 21:21

## 2023-05-04 RX ADMIN — HEPARIN SODIUM 1200 UNIT(S)/HR: 5000 INJECTION INTRAVENOUS; SUBCUTANEOUS at 07:17

## 2023-05-04 RX ADMIN — Medication 100 MILLIGRAM(S): at 17:33

## 2023-05-04 RX ADMIN — Medication 650 MILLIGRAM(S): at 22:23

## 2023-05-04 RX ADMIN — CHLORHEXIDINE GLUCONATE 1 APPLICATION(S): 213 SOLUTION TOPICAL at 05:46

## 2023-05-04 RX ADMIN — BUMETANIDE 1 MILLIGRAM(S): 0.25 INJECTION INTRAMUSCULAR; INTRAVENOUS at 11:19

## 2023-05-04 RX ADMIN — Medication 1 TABLET(S): at 11:20

## 2023-05-04 RX ADMIN — SPIRONOLACTONE 12.5 MILLIGRAM(S): 25 TABLET, FILM COATED ORAL at 05:45

## 2023-05-04 RX ADMIN — Medication 3 MILLILITER(S): at 17:33

## 2023-05-04 RX ADMIN — ATORVASTATIN CALCIUM 40 MILLIGRAM(S): 80 TABLET, FILM COATED ORAL at 21:21

## 2023-05-04 RX ADMIN — Medication 3 MILLILITER(S): at 11:19

## 2023-05-04 RX ADMIN — HEPARIN SODIUM 1200 UNIT(S)/HR: 5000 INJECTION INTRAVENOUS; SUBCUTANEOUS at 07:20

## 2023-05-04 RX ADMIN — Medication 1: at 21:21

## 2023-05-04 NOTE — PROGRESS NOTE ADULT - SUBJECTIVE AND OBJECTIVE BOX
NEPHROLOGY       no acute distress  on NC     MEDICATIONS  (STANDING):  albuterol/ipratropium for Nebulization 3 milliLiter(s) Nebulizer every 6 hours  aspirin  chewable 81 milliGRAM(s) Oral daily  atorvastatin 40 milliGRAM(s) Oral at bedtime  buMETAnide 2 milliGRAM(s) Oral daily  chlorhexidine 2% Cloths 1 Application(s) Topical <User Schedule>  dextrose 5%. 1000 milliLiter(s) (50 mL/Hr) IV Continuous <Continuous>  dextrose 5%. 1000 milliLiter(s) (100 mL/Hr) IV Continuous <Continuous>  dextrose 50% Injectable 25 Gram(s) IV Push once  dextrose 50% Injectable 12.5 Gram(s) IV Push once  dextrose 50% Injectable 25 Gram(s) IV Push once  ferrous    sulfate 325 milliGRAM(s) Oral daily  gabapentin 200 milliGRAM(s) Oral daily  glucagon  Injectable 1 milliGRAM(s) IntraMuscular once  heparin  Infusion. 1200 Unit(s)/Hr (12 mL/Hr) IV Continuous <Continuous>  insulin glargine Injectable (LANTUS) 7 Unit(s) SubCutaneous at bedtime  insulin lispro (ADMELOG) corrective regimen sliding scale   SubCutaneous three times a day before meals  insulin lispro (ADMELOG) corrective regimen sliding scale   SubCutaneous at bedtime  insulin lispro Injectable (ADMELOG) 4 Unit(s) SubCutaneous three times a day before meals  metoprolol tartrate 100 milliGRAM(s) Oral every 12 hours  Nephro-ashley 1 Tablet(s) Oral daily  pantoprazole    Tablet 40 milliGRAM(s) Oral before breakfast  polyethylene glycol 3350 17 Gram(s) Oral daily  senna 2 Tablet(s) Oral at bedtime  sodium bicarbonate 650 milliGRAM(s) Oral daily    VITALS:  T(C): , Max: 36.5 (05-02-23 @ 04:28)  T(F): , Max: 97.7 (05-02-23 @ 04:28)  HR: 76 (05-02-23 @ 13:06)  BP: 162/84 (05-02-23 @ 13:06)  RR: 16 (05-02-23 @ 13:06)  SpO2: 93% (05-02-23 @ 13:06)    I and O's:    05-01 @ 07:01  -  05-02 @ 07:00  --------------------------------------------------------  IN: 402 mL / OUT: 400 mL / NET: 2 mL    PHYSICAL EXAM:  General: Alerted, oriented  HEENT: MMM  Lungs:CTA-b/l  Heart: RRR S1S2  Abdomen: Soft, NTND  Ext:   edema b/l  : no chen    LABS:                        8.6    9.55  )-----------( 306      ( 02 May 2023 02:47 )             29.4     05-02    139  |  95<L>  |  30<H>  ----------------------------<  213<H>  4.2   |  35<H>  |  1.66<H>    Ca    9.5      02 May 2023 02:47  Phos  3.4     05-02  Mg     1.7     05-02    TPro  6.9  /  Alb  3.5  /  TBili  0.3  /  DBili  x   /  AST  21  /  ALT  19  /  AlkPhos  102  05-02    RADIOLOGY & ADDITIONAL STUDIES:    ACC: [id number] EXAM:  CT CHEST   ORDERED BY: KASIA NOEL     PROCEDURE DATE:  05/01/2023      INTERPRETATION:  CLINICAL INFORMATION: Heart failure exacerbation.   Hypoxia.    COMPARISON: CT chest from 3/24/2023, 10/27/2015.    CONTRAST/COMPLICATIONS:  IV Contrast: None.  Oral Contrast: None.  Complications: None documented.    PROCEDURE:  CT scan of the chest was obtained without intravenous contrast.    FINDINGS:    LYMPH NODES: A right lower paratracheal lymph node measures 1.5 cm,   unchanged from 2015.    HEART/VASCULATURE: Cardiomegaly. No pericardial effusion. The aorta is   normal in caliber. Status post CABG. Aortic and aortic root   calcifications.    AIRWAYS/LUNGS/PLEURA: Patent central airways. No endobronchial lesion.   Mosaic attenuation the lungs. New bilateral solid nodules including:  --4 mm right upper lobe nodule (series 3 image 27).  --5, 6 mm right lower lobe nodules (series 3 images 68, 70)  --Multiple smaller than 4 mm nodules within left upper lobe.    Small bilateral calcified nodules are unchanged. Small bilateral pleural   effusions not significantly changed from 3/24/2023 exam.    UPPER ABDOMEN: Cholelithiasis. 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 CBD stent partially visualized    BONES/SOFT TISSUES: Degenerative changes. Status post sternotomy, right   hip total arthroplasty, right mastectomy and axillary surgical clips.    IMPRESSION:  Small pleural effusions and mosaic attenuation of the lungs suggestive of   fluidoverload are not significantly changed from 2/3/2023.    New nonspecific 6 mm and smaller nodules. Recommend follow-up chest CT in   1-3 months to determine the stability.    --- End of Report ---     JUSTINE LAWSON MD; Resident Radiologist  This document has been electronically signed.  VERNELL LEAL MD; Attending Radiologist  This document has been electronically signed. May  2 2023 10:42AM

## 2023-05-04 NOTE — PROGRESS NOTE ADULT - ASSESSMENT
ASSESSMENT:  Ms. Batista is a 84 YO woman with PMH of HTN, HLD, DM2, HFpEF (EF 65%), CAD s/p CABG, Breast CA s/p R partial mastectomy, rectal CA s/p resection with previous admission for acute rissa s/p ERCP w/ stent c/b gallbladder perf w/ perc rissa and recurrent SVT, and recent admission for CHF exacerbation presenting for shortness of breath.----out patient nephrologist Dr Kan .    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,  nephrology aware , previous diuretic test showed minimal  function of that kidney)  CVS- BP controlled at present  hypervolemic - improving  Electrolytes - controlled  Anemia- -ckd related  also consistent  iron deficiency , tsat 7, ferritin 28---sp iv venofer  5 doses completed on 4/3023   GI--planning for ERCP  met alkalosis --- in the view of diuretic --- cont to monitor     RECOMMENDATIONS  1)Renal:   cont bumex 2mg daily  spironolactone 12.5 mg daily ( anti ryan + diuretic )    weight fluctuating   176  -178  lbs   monitor for alkalosis   keep k ~4 and mag 2, s/p mag ox 400mg po x 1 this am  avoid nephrotoxic medication  dose all medications for GFR ~30-35 ml/min  daily bmp, mag and Po4   renal diet   monitor I/Os  2)CVS: continue BP meds as ordered for now  3)cont daily oral iron   4)  start weekly aranesp 40 mcg SQ today     [facility]  Office: [phone]

## 2023-05-04 NOTE — PROGRESS NOTE ADULT - SUBJECTIVE AND OBJECTIVE BOX
DATE OF SERVICE: 05-04-23 @ 12:42    Patient is a 83y old  Female who presents with a chief complaint of Shortness of breath (04 May 2023 08:39)      INTERVAL HISTORY: Feels ok.     REVIEW OF SYSTEMS:  CONSTITUTIONAL: No weakness  EYES/ENT: No visual changes;  No throat pain   NECK: No pain or stiffness  RESPIRATORY: No cough, wheezing; No shortness of breath  CARDIOVASCULAR: No chest pain or palpitations  GASTROINTESTINAL: No abdominal  pain. No nausea, vomiting, or hematemesis  GENITOURINARY: No dysuria, frequency or hematuria  NEUROLOGICAL: No stroke like symptoms  SKIN: No rashes    TELEMETRY Personally reviewed: SR 60-80 PVC  	  MEDICATIONS:  buMETAnide Injectable 1 milliGRAM(s) IV Push two times a day  metoprolol tartrate 100 milliGRAM(s) Oral every 12 hours  spironolactone 12.5 milliGRAM(s) Oral daily        PHYSICAL EXAM:  T(C): 36.6 (05-04-23 @ 11:20), Max: 36.6 (05-04-23 @ 00:10)  HR: 79 (05-04-23 @ 11:20) (72 - 82)  BP: 130/60 (05-04-23 @ 11:20) (103/50 - 148/77)  RR: 18 (05-04-23 @ 11:20) (18 - 19)  SpO2: 97% (05-04-23 @ 11:20) (91% - 97%)  Wt(kg): --  I&O's Summary    03 May 2023 07:01  -  04 May 2023 07:00  --------------------------------------------------------  IN: 754 mL / OUT: 1000 mL / NET: -246 mL    04 May 2023 07:01  -  04 May 2023 12:42  --------------------------------------------------------  IN: 310 mL / OUT: 0 mL / NET: 310 mL          Appearance: In no distress	  HEENT:    PERRL, EOMI	  Cardiovascular:  S1 S2, No JVD  Respiratory: fine basilar crackles, + exp wheeze	  Gastrointestinal:  Soft, Non-tender, + BS	  Vascularature:  No edema of LE  Psychiatric: Appropriate affect   Neuro: no acute focal deficits                               10.0   10.01 )-----------( 273      ( 04 May 2023 06:49 )             33.6     05-04    140  |  93<L>  |  29<H>  ----------------------------<  233<H>  4.2   |  35<H>  |  1.85<H>    Ca    9.7      04 May 2023 06:49  Phos  3.3     05-04  Mg     1.8     05-04          Labs personally reviewed      ASSESSMENT/PLAN: 	    Ms. Batista is a 84 YO woman with PMH of HTN, HLD, DM2, HFpEF (EF 65%), CAD s/p CABG, Breast CA s/p R partial mastectomy, rectal CA s/p resection with recent admission for acute rissa s/p ERCP w/ stent c/b gallbladder perf w/ perc rissa and recurrent SVT, presenting for shortness of breath. Denies CP, palpitations, orthopnea or syncope. Follows as OP with Dr. Hagan.    Problem/Plan -1  Problem: Acute on Chronic Diastolic CHF  - POCUS suggestive of pulm edema  - CXR with no pulm edema or effusion. Prior CT one month ago with moderate effusions and pulm edema  - proBNP 2505  - Prior TTE 3/23 shows preserved EF, hyperdynamic LV function, basal inferoseptum hypokinesis, decreased RV systolic function severe pulm pressures, B/L pleural effusions  - repeat proBNP slightly higher at 2663   - Strict I&Os, daily weights  - Wean supplemental oxygen as tolerated, hypoxic to 87% on RA  - Continue Bumex to 1mg IV BID  - Initiate Himrod 25mg daily     Problem/Plan -2  Problem: CAD   - s/p CABG 2020  - ECG with no ischemia noted  - Trop 21  - c/w ASA, metoprolol and statin  - Prior TTE 3/23 shows preserved EF, hyperdynamic LV function, basal inferoseptal hypokinesis, decreased RV systolic function severe pulm pressures     Problem/Plan -3  Problem: hx of Recurrent SVT  - c/w Metoprolol 100mg PO BID  - cont to monitor on tele    Problem/Plan -4  Problem: Atrial Fibrillation  - Patient with n/o A-flutter on previous admission  - c/w Metoprolol   - eliquis 2.5mg PO BID on hold   - c/w Heparin gtt    Problem/Plan- 5  Problem: Hx of Cholecystitis  - ERCP on hold pending improvement in respiratory function          Delaney Vasquez, AG-NP   Sergey Winchester DO [facility]  Cardiovascular Medicine  [address] [place] [address], [address]  Available through call or text on [employer] TE[employer]  Office: [phone]   DATE OF SERVICE: 05-04-23 @ 12:42    Patient is a 83y old  Female who presents with a chief complaint of Shortness of breath (04 May 2023 08:39)      INTERVAL HISTORY: Feels ok.     REVIEW OF SYSTEMS:  CONSTITUTIONAL: No weakness  EYES/ENT: No visual changes;  No throat pain   NECK: No pain or stiffness  RESPIRATORY: No cough, wheezing; No shortness of breath  CARDIOVASCULAR: No chest pain or palpitations  GASTROINTESTINAL: No abdominal  pain. No nausea, vomiting, or hematemesis  GENITOURINARY: No dysuria, frequency or hematuria  NEUROLOGICAL: No stroke like symptoms  SKIN: No rashes    TELEMETRY Personally reviewed: SR 60-80 PVC  	  MEDICATIONS:  buMETAnide Injectable 1 milliGRAM(s) IV Push two times a day  metoprolol tartrate 100 milliGRAM(s) Oral every 12 hours  spironolactone 12.5 milliGRAM(s) Oral daily        PHYSICAL EXAM:  T(C): 36.6 (05-04-23 @ 11:20), Max: 36.6 (05-04-23 @ 00:10)  HR: 79 (05-04-23 @ 11:20) (72 - 82)  BP: 130/60 (05-04-23 @ 11:20) (103/50 - 148/77)  RR: 18 (05-04-23 @ 11:20) (18 - 19)  SpO2: 97% (05-04-23 @ 11:20) (91% - 97%)  Wt(kg): --  I&O's Summary    03 May 2023 07:01  -  04 May 2023 07:00  --------------------------------------------------------  IN: 754 mL / OUT: 1000 mL / NET: -246 mL    04 May 2023 07:01  -  04 May 2023 12:42  --------------------------------------------------------  IN: 310 mL / OUT: 0 mL / NET: 310 mL          Appearance: In no distress	  HEENT:    PERRL, EOMI	  Cardiovascular:  S1 S2, No JVD  Respiratory: fine basilar crackles, + exp wheeze	  Gastrointestinal:  Soft, Non-tender, + BS	  Vascularature:  No edema of LE  Psychiatric: Appropriate affect   Neuro: no acute focal deficits                               10.0   10.01 )-----------( 273      ( 04 May 2023 06:49 )             33.6     05-04    140  |  93<L>  |  29<H>  ----------------------------<  233<H>  4.2   |  35<H>  |  1.85<H>    Ca    9.7      04 May 2023 06:49  Phos  3.3     05-04  Mg     1.8     05-04          Labs personally reviewed      ASSESSMENT/PLAN: 	    Ms. Batista is a 82 YO woman with PMH of HTN, HLD, DM2, HFpEF (EF 65%), CAD s/p CABG, Breast CA s/p R partial mastectomy, rectal CA s/p resection with recent admission for acute rissa s/p ERCP w/ stent c/b gallbladder perf w/ perc rissa and recurrent SVT, presenting for shortness of breath. Denies CP, palpitations, orthopnea or syncope. Follows as OP with Dr. Hagan.    Problem/Plan -1  Problem: Acute on Chronic Diastolic CHF  - POCUS suggestive of pulm edema  - CXR with no pulm edema or effusion. Prior CT one month ago with moderate effusions and pulm edema  - proBNP 2505  - Prior TTE 3/23 shows preserved EF, hyperdynamic LV function, basal inferoseptum hypokinesis, decreased RV systolic function severe pulm pressures, B/L pleural effusions  - repeat proBNP slightly higher at 2663   - Strict I&Os, daily weights  - Wean supplemental oxygen as tolerated, still hypoxic on RA but improved  - Continue Bumex to 1mg IV BID, will titrate Friday AM  - Initiated Good 25mg daily     Problem/Plan -2  Problem: CAD   - s/p CABG 2020  - ECG with no ischemia noted  - Trop 21  - c/w ASA, metoprolol and statin  - Prior TTE 3/23 shows preserved EF, hyperdynamic LV function, basal inferoseptal hypokinesis, decreased RV systolic function severe pulm pressures     Problem/Plan -3  Problem: hx of Recurrent SVT  - c/w Metoprolol 100mg PO BID  - cont to monitor on tele    Problem/Plan -4  Problem: Atrial Fibrillation  - Patient with n/o A-flutter on previous admission  - c/w Metoprolol   - eliquis 2.5mg PO BID on hold   - c/w Heparin gtt    Problem/Plan- 5  Problem: Hx of Cholecystitis  - ERCP on hold pending improvement in respiratory function          FABIOLA Smith-NP   Sergey Winchester DO [facility]  Cardiovascular Medicine  [address] [place] [address], [address]  Available through call or text on [employer] TEAM[employer]  Office: [phone]

## 2023-05-04 NOTE — PROGRESS NOTE ADULT - SUBJECTIVE AND OBJECTIVE BOX
******INCOMPLETE NOTE******    No acute events overnight  Sanya Perry PGY2  Internal Medicine  Pager [phone] ([facility]), [phone] ([facility]), Available on Teams  After 7pm, please contact night float     Progress Note  04-24-23 (10d)  Patient is a 83y old  Female who presents with a chief complaint of Shortness of breath (04 May 2023 08:39)    Subjective / Interval Events :  - No acute events overnight.  - Pt seen and examined at bedside this AM. Feels like her breathing is worse today, feels like she has more fluid.     Additional ROS (if any): see above, otherwise negative     MEDICATIONS  (STANDING):  albuterol/ipratropium for Nebulization 3 milliLiter(s) Nebulizer every 6 hours  aspirin  chewable 81 milliGRAM(s) Oral daily  atorvastatin 40 milliGRAM(s) Oral at bedtime  buMETAnide Injectable 1 milliGRAM(s) IV Push two times a day  chlorhexidine 2% Cloths 1 Application(s) Topical <User Schedule>  darbepoetin Injectable ViaL 40 MICROGram(s) SubCutaneous every 7 days  dextrose 5%. 1000 milliLiter(s) (100 mL/Hr) IV Continuous <Continuous>  dextrose 5%. 1000 milliLiter(s) (50 mL/Hr) IV Continuous <Continuous>  dextrose 50% Injectable 25 Gram(s) IV Push once  dextrose 50% Injectable 12.5 Gram(s) IV Push once  dextrose 50% Injectable 25 Gram(s) IV Push once  ferrous    sulfate 325 milliGRAM(s) Oral daily  gabapentin 200 milliGRAM(s) Oral daily  glucagon  Injectable 1 milliGRAM(s) IntraMuscular once  heparin  Infusion. 1200 Unit(s)/Hr (12 mL/Hr) IV Continuous <Continuous>  insulin glargine Injectable (LANTUS) 10 Unit(s) SubCutaneous at bedtime  insulin lispro (ADMELOG) corrective regimen sliding scale   SubCutaneous three times a day before meals  insulin lispro (ADMELOG) corrective regimen sliding scale   SubCutaneous at bedtime  insulin lispro Injectable (ADMELOG) 5 Unit(s) SubCutaneous three times a day before meals  metoprolol tartrate 100 milliGRAM(s) Oral every 12 hours  Nephro-ashley 1 Tablet(s) Oral daily  pantoprazole    Tablet 40 milliGRAM(s) Oral before breakfast  polyethylene glycol 3350 17 Gram(s) Oral daily  senna 2 Tablet(s) Oral at bedtime  sodium bicarbonate 650 milliGRAM(s) Oral daily  spironolactone 12.5 milliGRAM(s) Oral daily    MEDICATIONS  (PRN):  acetaminophen     Tablet .. 650 milliGRAM(s) Oral every 6 hours PRN Temp greater or equal to 38C (100.4F), Mild Pain (1 - 3)  dextrose Oral Gel 15 Gram(s) Oral once PRN Blood Glucose LESS THAN 70 milliGRAM(s)/deciliter  melatonin 3 milliGRAM(s) Oral at bedtime PRN Insomnia      CAPILLARY BLOOD GLUCOSE      POCT Blood Glucose.: 224 mg/dL (04 May 2023 07:50)  POCT Blood Glucose.: 239 mg/dL (03 May 2023 21:08)  POCT Blood Glucose.: 236 mg/dL (03 May 2023 17:05)    I&O's Summary    03 May 2023 07:01  -  04 May 2023 07:00  --------------------------------------------------------  IN: 754 mL / OUT: 1000 mL / NET: -246 mL    04 May 2023 07:01  -  04 May 2023 11:45  --------------------------------------------------------  IN: 310 mL / OUT: 0 mL / NET: 310 mL        PHYSICAL EXAM:  Vital Signs Last 24 Hrs  T(C): 36.6 (04 May 2023 11:20), Max: 36.6 (04 May 2023 00:10)  T(F): 97.9 (04 May 2023 11:20), Max: 97.9 (04 May 2023 00:10)  HR: 79 (04 May 2023 11:20) (72 - 82)  BP: 130/60 (04 May 2023 11:20) (103/50 - 148/77)  BP(mean): --  RR: 18 (04 May 2023 11:20) (18 - 19)  SpO2: 97% (04 May 2023 11:20) (91% - 97%)    Parameters below as of 04 May 2023 11:20  Patient On (Oxygen Delivery Method): nasal cannula  O2 Flow (L/min): 1      General: NAD, non-toxic appearing   HEENT: EOMi, no scleral icterus  CV: RRR, normal S1 and S2  Lungs: normal respiratory effort on 2L NC, +crackles over b/l lower lungs, scant expiratory wheezes  Abd: soft, nontender, nondistended  Ext: no edema, warm, well perfused  Pysch: AAOx3, appropriate affect   Neuro: grossly non-focal, moving all extremities spontaneously   Skin: no rashes or lesions       LABS:                          10.0   10.01 )-----------( 273      ( 04 May 2023 06:49 )             33.6       WBC Trend: 10.01<--, 9.74<--, 9.55<--  Hb Trend: 10.0<--, 9.7<--, 8.6<--, 9.2<--, 10.1<--    05-04    140  |  93<L>  |  29<H>  ----------------------------<  233<H>  4.2   |  35<H>  |  1.85<H>    Ca    9.7      04 May 2023 06:49  Phos  3.3     05-04  Mg     1.8     05-04      PTT - ( 04 May 2023 06:49 )  PTT:83.7 sec          RADIOLOGY & ADDITIONAL TESTS: Reviewed  - No new images

## 2023-05-04 NOTE — PROGRESS NOTE ADULT - ASSESSMENT
Ms. Batista is a 84 YO woman with PMH of HTN, HLD, DM2, HFpEF (EF 65%), CAD s/p CABG, Breast CA s/p R partial mastectomy, rectal CA s/p resection with recent admission for acute rissa s/p ERCP w/ stent c/b gallbladder perf w/ perc rissa and recurrent SVT, presenting for shortness of breath, POCUS of lungs c/w pulmonary edema, proBNP 2505.      Ms. Batista is a 84 YO woman with PMH of HTN, HLD, T2DM, HFpEF (EF 65%), CAD s/p CABG, Breast CA s/p R partial mastectomy, rectal CA s/p resection with recent admission for acute rissa s/p ERCP w/ stent c/b gallbladder perf w/ perc rissa and recurrent SVT, presenting for shortness of breath, admitted for HF exacerbation. Remains on O2 despite continued diuresis.

## 2023-05-04 NOTE — PROGRESS NOTE ADULT - PROBLEM SELECTOR PLAN 2
- Currently on 1L NC  - Will attempt to wean to room air  - Not on home O2  - Duonebs q6 for wheezes  - Incentive spirometry - Currently on 2L NC  - Will attempt to wean to room air  - Not on home O2  - Duonebs q6 for wheezes  - Incentive spirometry

## 2023-05-04 NOTE — PROGRESS NOTE ADULT - PROBLEM SELECTOR PLAN 1
ProBNP 2505, TTE 3/23 with EF 76%, severe pulmonary hypertension, moderate diastolic dysfunction. POCUS done in ED cw pulmonary edema  - CT chest with unchanged pulmonary edema/effusions  - Diuresis with Bumex 1 mg IV - per nephro, would benefit from 2 mg BID or adding aldactone 30 mg daily though in the latter case would have concern as low GFR  - Strict I&Os  - Daily weights ProBNP 2505, TTE 3/23 with EF 76%, severe pulmonary hypertension, moderate diastolic dysfunction. POCUS done in ED cw pulmonary edema  - CT chest 5/1 with unchanged pulmonary edema/effusions  - c/w bumex 1mg IV BID   - c/w aldactone 12.5mg daily   - Strict I&Os  - Daily weights    - at baseline weight but clinically appears more overloaded today, discussed with nephro, will give additional bumex 1mg IV x1

## 2023-05-04 NOTE — PROGRESS NOTE ADULT - PROBLEM SELECTOR PLAN 4
Normocytic anemia - likely 2/2 CKD  - Iron studies consistent with MILLY - will start iron supplementation  - Per nephro, started on darbepoitin  - Bowel regimen

## 2023-05-04 NOTE — PROGRESS NOTE ADULT - ATTENDING COMMENTS
82 YO woman with PMH of HTN, HLD, DM2, HFpEF (EF 65%), CAD s/p CABG, Breast CA s/p R partial mastectomy, rectal CA s/p resection with recent admission for acute rissa s/p ERCP w/ stent c/b gallbladder perf w/ perc rissa and recurrent SVT, presenting for shortness of breath,     #Acute on chronic CHF exacerbation  #Acute hypoxic respiratory failure   #H/o cholecystitis   #CKD  #DM  #Chronic Afib  #HTN  #Anemia    - presented due to SOB; s/p bumex 2mg IV in ED and placed on BiPAP; appears to be improving overall; no longer on BiPAP > now on 1LNC, wean as tolerated   - bumex changed to 1mg IV BID per cardio recs (patient is on bumex 1mg PO daily at home)  - strict I/O; daily weight    - GI recs appreciated; original plan for ERCP 4/26 but postponed due to being on oxygen; wean off O2 as tolerated and f/u GI for procedure as inpatient when able to; patient was originally scheduled as outpatient 4/26  - hold eliquis for now for possible procedure; heparin drip for now.  - anemia panel noted; appears iron deficient; iron supplement added on; completed course of IV iron per nephro recs   - bowel regimen.  - PT eval recs outpatient PT    - GOC discussion; DNR/DNI; MOLST drafted and placed in chart   - patient still on O2; CT chest done showing pulm edema still; s/p additional IV bumex on 5/2 and 5/4; f/u cardio regarding further adjustment of meds   - attempt to wean off O2; if not able to be weaned, can obtain ambu sats and see if patient requires home O2 prior to DC  - GI f/u for ERCP while inpatient if possible; daughter would prefer procedure to be done while inpatient   - aldactone added on 5/3; creatinine function slightly increased from yesterday to 1.85; continue to monitor; nephro following   - insulin adjusted due to elevated blood sugars  - aranesp per nephro recs     Rest as above. Discussed with HS.

## 2023-05-05 LAB
ANION GAP SERPL CALC-SCNC: 12 MMOL/L — SIGNIFICANT CHANGE UP (ref 5–17)
APTT BLD: 91.6 SEC — HIGH (ref 27.5–35.5)
BUN SERPL-MCNC: 35 MG/DL — HIGH (ref 7–23)
CALCIUM SERPL-MCNC: 9.6 MG/DL — SIGNIFICANT CHANGE UP (ref 8.4–10.5)
CHLORIDE SERPL-SCNC: 94 MMOL/L — LOW (ref 96–108)
CK MB CFR SERPL CALC: 1.2 NG/ML — SIGNIFICANT CHANGE UP (ref 0–3.8)
CK SERPL-CCNC: 17 U/L — LOW (ref 25–170)
CO2 SERPL-SCNC: 34 MMOL/L — HIGH (ref 22–31)
CREAT SERPL-MCNC: 1.84 MG/DL — HIGH (ref 0.5–1.3)
EGFR: 27 ML/MIN/1.73M2 — LOW
GLUCOSE BLDC GLUCOMTR-MCNC: 173 MG/DL — HIGH (ref 70–99)
GLUCOSE BLDC GLUCOMTR-MCNC: 189 MG/DL — HIGH (ref 70–99)
GLUCOSE BLDC GLUCOMTR-MCNC: 265 MG/DL — HIGH (ref 70–99)
GLUCOSE BLDC GLUCOMTR-MCNC: 294 MG/DL — HIGH (ref 70–99)
GLUCOSE SERPL-MCNC: 197 MG/DL — HIGH (ref 70–99)
HCT VFR BLD CALC: 33.2 % — LOW (ref 34.5–45)
HGB BLD-MCNC: 9.7 G/DL — LOW (ref 11.5–15.5)
MAGNESIUM SERPL-MCNC: 1.9 MG/DL — SIGNIFICANT CHANGE UP (ref 1.6–2.6)
MCHC RBC-ENTMCNC: 27.3 PG — SIGNIFICANT CHANGE UP (ref 27–34)
MCHC RBC-ENTMCNC: 29.2 GM/DL — LOW (ref 32–36)
MCV RBC AUTO: 93.5 FL — SIGNIFICANT CHANGE UP (ref 80–100)
NRBC # BLD: 0 /100 WBCS — SIGNIFICANT CHANGE UP (ref 0–0)
PHOSPHATE SERPL-MCNC: 4.4 MG/DL — SIGNIFICANT CHANGE UP (ref 2.5–4.5)
PLATELET # BLD AUTO: 263 K/UL — SIGNIFICANT CHANGE UP (ref 150–400)
POTASSIUM SERPL-MCNC: 3.8 MMOL/L — SIGNIFICANT CHANGE UP (ref 3.5–5.3)
POTASSIUM SERPL-SCNC: 3.8 MMOL/L — SIGNIFICANT CHANGE UP (ref 3.5–5.3)
RBC # BLD: 3.55 M/UL — LOW (ref 3.8–5.2)
RBC # FLD: 18.3 % — HIGH (ref 10.3–14.5)
SODIUM SERPL-SCNC: 140 MMOL/L — SIGNIFICANT CHANGE UP (ref 135–145)
TROPONIN T, HIGH SENSITIVITY RESULT: 23 NG/L — SIGNIFICANT CHANGE UP (ref 0–51)
WBC # BLD: 8.4 K/UL — SIGNIFICANT CHANGE UP (ref 3.8–10.5)
WBC # FLD AUTO: 8.4 K/UL — SIGNIFICANT CHANGE UP (ref 3.8–10.5)

## 2023-05-05 PROCEDURE — 99233 SBSQ HOSP IP/OBS HIGH 50: CPT | Mod: GC

## 2023-05-05 PROCEDURE — 93010 ELECTROCARDIOGRAM REPORT: CPT

## 2023-05-05 RX ORDER — SPIRONOLACTONE 25 MG/1
25 TABLET, FILM COATED ORAL DAILY
Refills: 0 | Status: DISCONTINUED | OUTPATIENT
Start: 2023-05-05 | End: 2023-05-08

## 2023-05-05 RX ORDER — POTASSIUM CHLORIDE 20 MEQ
40 PACKET (EA) ORAL ONCE
Refills: 0 | Status: COMPLETED | OUTPATIENT
Start: 2023-05-05 | End: 2023-05-05

## 2023-05-05 RX ADMIN — HEPARIN SODIUM 1200 UNIT(S)/HR: 5000 INJECTION INTRAVENOUS; SUBCUTANEOUS at 06:56

## 2023-05-05 RX ADMIN — Medication 40 MILLIEQUIVALENT(S): at 13:57

## 2023-05-05 RX ADMIN — PANTOPRAZOLE SODIUM 40 MILLIGRAM(S): 20 TABLET, DELAYED RELEASE ORAL at 05:40

## 2023-05-05 RX ADMIN — HEPARIN SODIUM 1200 UNIT(S)/HR: 5000 INJECTION INTRAVENOUS; SUBCUTANEOUS at 07:05

## 2023-05-05 RX ADMIN — Medication 3 MILLILITER(S): at 11:04

## 2023-05-05 RX ADMIN — Medication 100 MILLIGRAM(S): at 17:23

## 2023-05-05 RX ADMIN — Medication 1 TABLET(S): at 11:04

## 2023-05-05 RX ADMIN — Medication 650 MILLIGRAM(S): at 14:59

## 2023-05-05 RX ADMIN — Medication 81 MILLIGRAM(S): at 11:05

## 2023-05-05 RX ADMIN — Medication 650 MILLIGRAM(S): at 11:05

## 2023-05-05 RX ADMIN — ATORVASTATIN CALCIUM 40 MILLIGRAM(S): 80 TABLET, FILM COATED ORAL at 21:27

## 2023-05-05 RX ADMIN — GABAPENTIN 200 MILLIGRAM(S): 400 CAPSULE ORAL at 11:05

## 2023-05-05 RX ADMIN — BUMETANIDE 1 MILLIGRAM(S): 0.25 INJECTION INTRAMUSCULAR; INTRAVENOUS at 13:58

## 2023-05-05 RX ADMIN — Medication 3 MILLILITER(S): at 05:40

## 2023-05-05 RX ADMIN — BUMETANIDE 1 MILLIGRAM(S): 0.25 INJECTION INTRAMUSCULAR; INTRAVENOUS at 05:39

## 2023-05-05 RX ADMIN — Medication 3 MILLILITER(S): at 18:57

## 2023-05-05 RX ADMIN — POLYETHYLENE GLYCOL 3350 17 GRAM(S): 17 POWDER, FOR SOLUTION ORAL at 11:05

## 2023-05-05 RX ADMIN — Medication 5 UNIT(S): at 12:17

## 2023-05-05 RX ADMIN — INSULIN GLARGINE 10 UNIT(S): 100 INJECTION, SOLUTION SUBCUTANEOUS at 21:32

## 2023-05-05 RX ADMIN — Medication 325 MILLIGRAM(S): at 11:04

## 2023-05-05 RX ADMIN — Medication 3: at 08:50

## 2023-05-05 RX ADMIN — SPIRONOLACTONE 25 MILLIGRAM(S): 25 TABLET, FILM COATED ORAL at 17:23

## 2023-05-05 RX ADMIN — SENNA PLUS 2 TABLET(S): 8.6 TABLET ORAL at 21:27

## 2023-05-05 RX ADMIN — Medication 650 MILLIGRAM(S): at 13:59

## 2023-05-05 RX ADMIN — Medication 1: at 12:17

## 2023-05-05 RX ADMIN — Medication 5 UNIT(S): at 18:02

## 2023-05-05 RX ADMIN — Medication 5 UNIT(S): at 08:50

## 2023-05-05 RX ADMIN — CHLORHEXIDINE GLUCONATE 1 APPLICATION(S): 213 SOLUTION TOPICAL at 05:40

## 2023-05-05 RX ADMIN — Medication 1: at 18:02

## 2023-05-05 RX ADMIN — SPIRONOLACTONE 12.5 MILLIGRAM(S): 25 TABLET, FILM COATED ORAL at 05:39

## 2023-05-05 RX ADMIN — Medication 1: at 21:33

## 2023-05-05 RX ADMIN — HEPARIN SODIUM 1200 UNIT(S)/HR: 5000 INJECTION INTRAVENOUS; SUBCUTANEOUS at 18:56

## 2023-05-05 RX ADMIN — Medication 100 MILLIGRAM(S): at 05:39

## 2023-05-05 NOTE — PROGRESS NOTE ADULT - SUBJECTIVE AND OBJECTIVE BOX
Patient is a 83y old  Female who presents with a chief complaint of Shortness of breath (04 May 2023 12:41)      SUBJECTIVE / OVERNIGHT EVENTS:    MEDICATIONS  (STANDING):  albuterol/ipratropium for Nebulization 3 milliLiter(s) Nebulizer every 6 hours  aspirin  chewable 81 milliGRAM(s) Oral daily  atorvastatin 40 milliGRAM(s) Oral at bedtime  buMETAnide Injectable 1 milliGRAM(s) IV Push two times a day  chlorhexidine 2% Cloths 1 Application(s) Topical <User Schedule>  darbepoetin Injectable ViaL 40 MICROGram(s) SubCutaneous every 7 days  dextrose 5%. 1000 milliLiter(s) (50 mL/Hr) IV Continuous <Continuous>  dextrose 5%. 1000 milliLiter(s) (100 mL/Hr) IV Continuous <Continuous>  dextrose 50% Injectable 25 Gram(s) IV Push once  dextrose 50% Injectable 25 Gram(s) IV Push once  dextrose 50% Injectable 12.5 Gram(s) IV Push once  ferrous    sulfate 325 milliGRAM(s) Oral daily  gabapentin 200 milliGRAM(s) Oral daily  glucagon  Injectable 1 milliGRAM(s) IntraMuscular once  heparin  Infusion. 1200 Unit(s)/Hr (12 mL/Hr) IV Continuous <Continuous>  insulin glargine Injectable (LANTUS) 10 Unit(s) SubCutaneous at bedtime  insulin lispro (ADMELOG) corrective regimen sliding scale   SubCutaneous at bedtime  insulin lispro (ADMELOG) corrective regimen sliding scale   SubCutaneous three times a day before meals  insulin lispro Injectable (ADMELOG) 5 Unit(s) SubCutaneous three times a day before meals  metoprolol tartrate 100 milliGRAM(s) Oral every 12 hours  Nephro-ashley 1 Tablet(s) Oral daily  pantoprazole    Tablet 40 milliGRAM(s) Oral before breakfast  polyethylene glycol 3350 17 Gram(s) Oral daily  senna 2 Tablet(s) Oral at bedtime  sodium bicarbonate 650 milliGRAM(s) Oral daily  spironolactone 12.5 milliGRAM(s) Oral daily    MEDICATIONS  (PRN):  acetaminophen     Tablet .. 650 milliGRAM(s) Oral every 6 hours PRN Temp greater or equal to 38C (100.4F), Mild Pain (1 - 3)  dextrose Oral Gel 15 Gram(s) Oral once PRN Blood Glucose LESS THAN 70 milliGRAM(s)/deciliter  melatonin 3 milliGRAM(s) Oral at bedtime PRN Insomnia      CAPILLARY BLOOD GLUCOSE      POCT Blood Glucose.: 251 mg/dL (04 May 2023 21:07)  POCT Blood Glucose.: 172 mg/dL (04 May 2023 17:23)  POCT Blood Glucose.: 192 mg/dL (04 May 2023 12:05)  POCT Blood Glucose.: 224 mg/dL (04 May 2023 07:50)    I&O's Summary    04 May 2023 07:01  -  05 May 2023 07:00  --------------------------------------------------------  IN: 610 mL / OUT: 2300 mL / NET: -1690 mL        Vital Signs Last 24 Hrs  T(C): 36.3 (05 May 2023 04:24), Max: 36.8 (05 May 2023 00:08)  T(F): 97.4 (05 May 2023 04:24), Max: 98.3 (05 May 2023 00:08)  HR: 70 (05 May 2023 04:24) (67 - 79)  BP: 128/79 (05 May 2023 04:24) (118/74 - 130/60)  BP(mean): --  RR: 17 (05 May 2023 04:24) (17 - 18)  SpO2: 99% (05 May 2023 04:24) (96% - 99%)    Parameters below as of 05 May 2023 04:24  Patient On (Oxygen Delivery Method): nasal cannula  O2 Flow (L/min): 1      PHYSICAL EXAM:  GENERAL: Sitting in bed in no acute distress, wearing 1L NC though nasal cannula partially out of nose  EYES: Conjunctiva noninjected or pale, sclera anicteric  HENT: NC/AT, moist mucous membranes  NECK: Supple, trachea midline  LUNG: CTAB  CV: RRR, Pulses- Radial: 2+ b/l.   ABDOMEN: Nondistended, nontender  MSK: No visible deformities, nontender extremities. R arm with chronic lymphedema, no pitting edema noted in any other extremity.   SKIN: No rashes, bruises  NEURO: AAOx4 (to person, place, time, event), no tremor    LABS:                        9.7    8.40  )-----------( 263      ( 05 May 2023 06:21 )             33.2      05-05    140  |  94<L>  |  35<H>  ----------------------------<  197<H>  3.8   |  34<H>  |  1.84<H>    Ca    9.6      05 May 2023 06:23  Phos  4.4     05-05  Mg     1.9     05-05      PTT - ( 05 May 2023 06:21 )  PTT:91.6 sec          RADIOLOGY & ADDITIONAL TESTS:    Imaging Personally Reviewed:    Consultant(s) Notes Reviewed:      Care Discussed with Consultants/Other Providers:   Patient is a 83y old  Female who presents with a chief complaint of Shortness of breath (04 May 2023 12:41)      SUBJECTIVE / OVERNIGHT EVENTS: No acute events overnight. Patient seen and examined at bedside, denies complaints apart from mild pain on inspiration.     MEDICATIONS  (STANDING):  albuterol/ipratropium for Nebulization 3 milliLiter(s) Nebulizer every 6 hours  aspirin  chewable 81 milliGRAM(s) Oral daily  atorvastatin 40 milliGRAM(s) Oral at bedtime  buMETAnide Injectable 1 milliGRAM(s) IV Push two times a day  chlorhexidine 2% Cloths 1 Application(s) Topical <User Schedule>  darbepoetin Injectable ViaL 40 MICROGram(s) SubCutaneous every 7 days  dextrose 5%. 1000 milliLiter(s) (50 mL/Hr) IV Continuous <Continuous>  dextrose 5%. 1000 milliLiter(s) (100 mL/Hr) IV Continuous <Continuous>  dextrose 50% Injectable 25 Gram(s) IV Push once  dextrose 50% Injectable 25 Gram(s) IV Push once  dextrose 50% Injectable 12.5 Gram(s) IV Push once  ferrous    sulfate 325 milliGRAM(s) Oral daily  gabapentin 200 milliGRAM(s) Oral daily  glucagon  Injectable 1 milliGRAM(s) IntraMuscular once  heparin  Infusion. 1200 Unit(s)/Hr (12 mL/Hr) IV Continuous <Continuous>  insulin glargine Injectable (LANTUS) 10 Unit(s) SubCutaneous at bedtime  insulin lispro (ADMELOG) corrective regimen sliding scale   SubCutaneous at bedtime  insulin lispro (ADMELOG) corrective regimen sliding scale   SubCutaneous three times a day before meals  insulin lispro Injectable (ADMELOG) 5 Unit(s) SubCutaneous three times a day before meals  metoprolol tartrate 100 milliGRAM(s) Oral every 12 hours  Nephro-ashley 1 Tablet(s) Oral daily  pantoprazole    Tablet 40 milliGRAM(s) Oral before breakfast  polyethylene glycol 3350 17 Gram(s) Oral daily  senna 2 Tablet(s) Oral at bedtime  sodium bicarbonate 650 milliGRAM(s) Oral daily  spironolactone 12.5 milliGRAM(s) Oral daily    MEDICATIONS  (PRN):  acetaminophen     Tablet .. 650 milliGRAM(s) Oral every 6 hours PRN Temp greater or equal to 38C (100.4F), Mild Pain (1 - 3)  dextrose Oral Gel 15 Gram(s) Oral once PRN Blood Glucose LESS THAN 70 milliGRAM(s)/deciliter  melatonin 3 milliGRAM(s) Oral at bedtime PRN Insomnia      CAPILLARY BLOOD GLUCOSE      POCT Blood Glucose.: 251 mg/dL (04 May 2023 21:07)  POCT Blood Glucose.: 172 mg/dL (04 May 2023 17:23)  POCT Blood Glucose.: 192 mg/dL (04 May 2023 12:05)  POCT Blood Glucose.: 224 mg/dL (04 May 2023 07:50)    I&O's Summary    04 May 2023 07:01  -  05 May 2023 07:00  --------------------------------------------------------  IN: 610 mL / OUT: 2300 mL / NET: -1690 mL        Vital Signs Last 24 Hrs  T(C): 36.3 (05 May 2023 04:24), Max: 36.8 (05 May 2023 00:08)  T(F): 97.4 (05 May 2023 04:24), Max: 98.3 (05 May 2023 00:08)  HR: 70 (05 May 2023 04:24) (67 - 79)  BP: 128/79 (05 May 2023 04:24) (118/74 - 130/60)  BP(mean): --  RR: 17 (05 May 2023 04:24) (17 - 18)  SpO2: 99% (05 May 2023 04:24) (96% - 99%)    Parameters below as of 05 May 2023 04:24  Patient On (Oxygen Delivery Method): nasal cannula  O2 Flow (L/min): 1      PHYSICAL EXAM:  GENERAL: Sitting in bed in no acute distress  EYES: Conjunctiva noninjected or pale, sclera anicteric  HENT: NC/AT, moist mucous membranes  NECK: Supple, trachea midline  LUNG: CTAB. On 1L nasal cannula, though removed during conversation with no increased work of breathing  CV: RRR, Pulses- Radial: 2+ b/l.   ABDOMEN: Nondistended, nontender  MSK: No visible deformities, nontender extremities. R arm with chronic lymphedema, no pitting edema noted in any other extremity.   SKIN: No rashes, bruises  NEURO: AAOx4 (to person, place, time, event), no tremor    LABS:                        9.7    8.40  )-----------( 263      ( 05 May 2023 06:21 )             33.2      05-05    140  |  94<L>  |  35<H>  ----------------------------<  197<H>  3.8   |  34<H>  |  1.84<H>    Ca    9.6      05 May 2023 06:23  Phos  4.4     05-05  Mg     1.9     05-05      PTT - ( 05 May 2023 06:21 )  PTT:91.6 sec          RADIOLOGY & ADDITIONAL TESTS:    Imaging Personally Reviewed:    Consultant(s) Notes Reviewed:      Care Discussed with Consultants/Other Providers:

## 2023-05-05 NOTE — PROGRESS NOTE ADULT - SUBJECTIVE AND OBJECTIVE BOX
NEPHROLOGY       no acute distress  saturating well at RA    MEDICATIONS  (STANDING):  albuterol/ipratropium for Nebulization 3 milliLiter(s) Nebulizer every 6 hours  aspirin  chewable 81 milliGRAM(s) Oral daily  atorvastatin 40 milliGRAM(s) Oral at bedtime  buMETAnide 2 milliGRAM(s) Oral daily  chlorhexidine 2% Cloths 1 Application(s) Topical <User Schedule>  dextrose 5%. 1000 milliLiter(s) (50 mL/Hr) IV Continuous <Continuous>  dextrose 5%. 1000 milliLiter(s) (100 mL/Hr) IV Continuous <Continuous>  dextrose 50% Injectable 25 Gram(s) IV Push once  dextrose 50% Injectable 12.5 Gram(s) IV Push once  dextrose 50% Injectable 25 Gram(s) IV Push once  ferrous    sulfate 325 milliGRAM(s) Oral daily  gabapentin 200 milliGRAM(s) Oral daily  glucagon  Injectable 1 milliGRAM(s) IntraMuscular once  heparin  Infusion. 1200 Unit(s)/Hr (12 mL/Hr) IV Continuous <Continuous>  insulin glargine Injectable (LANTUS) 7 Unit(s) SubCutaneous at bedtime  insulin lispro (ADMELOG) corrective regimen sliding scale   SubCutaneous three times a day before meals  insulin lispro (ADMELOG) corrective regimen sliding scale   SubCutaneous at bedtime  insulin lispro Injectable (ADMELOG) 4 Unit(s) SubCutaneous three times a day before meals  metoprolol tartrate 100 milliGRAM(s) Oral every 12 hours  Nephro-ashley 1 Tablet(s) Oral daily  pantoprazole    Tablet 40 milliGRAM(s) Oral before breakfast  polyethylene glycol 3350 17 Gram(s) Oral daily  senna 2 Tablet(s) Oral at bedtime  sodium bicarbonate 650 milliGRAM(s) Oral daily    VITALS:  T(C): , Max: 36.5 (05-02-23 @ 04:28)  T(F): , Max: 97.7 (05-02-23 @ 04:28)  HR: 76 (05-02-23 @ 13:06)  BP: 162/84 (05-02-23 @ 13:06)  RR: 16 (05-02-23 @ 13:06)  SpO2: 93% (05-02-23 @ 13:06)    I and O's:    05-01 @ 07:01  -  05-02 @ 07:00  --------------------------------------------------------  IN: 402 mL / OUT: 400 mL / NET: 2 mL    PHYSICAL EXAM:  General: Alerted, oriented  HEENT: MMM  Lungs:CTA-b/l  Heart: RRR S1S2  Abdomen: Soft, NTND  Ext:   edema b/l  : no chen    LABS:                        8.6    9.55  )-----------( 306      ( 02 May 2023 02:47 )             29.4     05-02    139  |  95<L>  |  30<H>  ----------------------------<  213<H>  4.2   |  35<H>  |  1.66<H>    Ca    9.5      02 May 2023 02:47  Phos  3.4     05-02  Mg     1.7     05-02    TPro  6.9  /  Alb  3.5  /  TBili  0.3  /  DBili  x   /  AST  21  /  ALT  19  /  AlkPhos  102  05-02    RADIOLOGY & ADDITIONAL STUDIES:    ACC: [id number] EXAM:  CT CHEST   ORDERED BY: KASIA NOEL     PROCEDURE DATE:  05/01/2023      INTERPRETATION:  CLINICAL INFORMATION: Heart failure exacerbation.   Hypoxia.    COMPARISON: CT chest from 3/24/2023, 10/27/2015.    CONTRAST/COMPLICATIONS:  IV Contrast: None.  Oral Contrast: None.  Complications: None documented.    PROCEDURE:  CT scan of the chest was obtained without intravenous contrast.    FINDINGS:    LYMPH NODES: A right lower paratracheal lymph node measures 1.5 cm,   unchanged from 2015.    HEART/VASCULATURE: Cardiomegaly. No pericardial effusion. The aorta is   normal in caliber. Status post CABG. Aortic and aortic root   calcifications.    AIRWAYS/LUNGS/PLEURA: Patent central airways. No endobronchial lesion.   Mosaic attenuation the lungs. New bilateral solid nodules including:  --4 mm right upper lobe nodule (series 3 image 27).  --5, 6 mm right lower lobe nodules (series 3 images 68, 70)  --Multiple smaller than 4 mm nodules within left upper lobe.    Small bilateral calcified nodules are unchanged. Small bilateral pleural   effusions not significantly changed from 3/24/2023 exam.    UPPER ABDOMEN: Cholelithiasis. 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 CBD stent partially visualized    BONES/SOFT TISSUES: Degenerative changes. Status post sternotomy, right   hip total arthroplasty, right mastectomy and axillary surgical clips.    IMPRESSION:  Small pleural effusions and mosaic attenuation of the lungs suggestive of   fluidoverload are not significantly changed from 2/3/2023.    New nonspecific 6 mm and smaller nodules. Recommend follow-up chest CT in   1-3 months to determine the stability.    --- End of Report ---     JUSTINE LAWSON MD; Resident Radiologist  This document has been electronically signed.  VERNELL LEAL MD; Attending Radiologist  This document has been electronically signed. May  2 2023 10:42AM

## 2023-05-05 NOTE — PROGRESS NOTE ADULT - PROBLEM SELECTOR PLAN 2
- Currently on 2L NC  - Will attempt to wean to room air  - Not on home O2  - Duonebs q6 for wheezes  - Incentive spirometry - Currently on 1L NC  - Will attempt to wean to room air  - Not on home O2  - Duonebs q6 for wheezes  - Incentive spirometry

## 2023-05-05 NOTE — PROGRESS NOTE ADULT - ASSESSMENT
ASSESSMENT:  Ms. Batista is a 82 YO woman with PMH of HTN, HLD, DM2, HFpEF (EF 65%), CAD s/p CABG, Breast CA s/p R partial mastectomy, rectal CA s/p resection with previous admission for acute rissa s/p ERCP w/ stent c/b gallbladder perf w/ perc rissa and recurrent SVT, and recent admission for CHF exacerbation presenting for shortness of breath.----out patient nephrologist Dr Kan .    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,  nephrology aware , previous diuretic test showed minimal  function of that kidney)  CVS- BP controlled at present  hypervolemia - improving  Electrolytes - controlled  Anemia- -ckd related  also consistent  iron deficiency , tsat 7, ferritin 28---sp iv venofer  5 doses completed on 4/3023   GI--planning for ERCP  met alkalosis --- in the view of diuretic --- cont to monitor     RECOMMENDATIONS  1)Renal: renal stable   bumex as ordered    spironolactone 12.5 mg daily ( anti ryan + diuretic )    monitor for alkalosis   keep k ~4 and mag 2,  give potassium 40 meq once today .  avoid nephrotoxic medication  dose all medications for GFR ~30-35 ml/min  daily bmp, mag and Po4   renal diet   monitor I/Os  cont nebs   2)CVS: continue BP meds as ordered for now  3)cont daily oral iron   4)weekly aranesp 40 mcg SQ today     [facility]  Office: [phone]

## 2023-05-05 NOTE — PROGRESS NOTE ADULT - ATTENDING COMMENTS
84 YO woman with PMH of HTN, HLD, DM2, HFpEF (EF 65%), CAD s/p CABG, Breast CA s/p R partial mastectomy, rectal CA s/p resection with recent admission for acute rissa s/p ERCP w/ stent c/b gallbladder perf w/ perc rissa and recurrent SVT, presenting for shortness of breath,     #Acute on chronic CHF exacerbation  #Acute hypoxic respiratory failure   #H/o cholecystitis   #CKD  #DM  #Chronic Afib  #HTN  #Anemia    - presented due to SOB; s/p bumex 2mg IV in ED and placed on BiPAP; appears to be improving overall; no longer on BiPAP > now on 1LNC, wean as tolerated   - bumex changed to 1mg IV BID per cardio recs (patient is on bumex 1mg PO daily at home)  - strict I/O; daily weight    - GI recs appreciated; original plan for ERCP 4/26 but postponed due to being on oxygen; wean off O2 as tolerated and f/u GI for procedure as inpatient when able to; patient was originally scheduled as outpatient 4/26  - hold eliquis for now for possible procedure; heparin drip for now.  - anemia panel noted; appears iron deficient; iron supplement added on; completed course of IV iron per nephro recs   - bowel regimen.  - PT eval recs outpatient PT    - GOC discussion; DNR/DNI; MOLST drafted and placed in chart   - patient still on O2; CT chest done showing pulm edema still; s/p additional IV bumex on 5/2 and 5/4; f/u cardio regarding further adjustment of meds   - attempt to wean off O2; if not able to be weaned, can obtain ambu sats and see if patient requires home O2 prior to DC  - GI f/u for ERCP while inpatient if possible; daughter would prefer procedure to be done while inpatient   - aldactone added on 5/3; creatinine function slightly increased to 1.85 from 1.6; appears stable this morning; continue to monitor; nephro following   - insulin adjusted due to elevated blood sugars  - aranesp per nephro recs     Rest as above. Discussed with HS. 82 YO woman with PMH of HTN, HLD, DM2, HFpEF (EF 65%), CAD s/p CABG, Breast CA s/p R partial mastectomy, rectal CA s/p resection with recent admission for acute rissa s/p ERCP w/ stent c/b gallbladder perf w/ perc rissa and recurrent SVT, presenting for shortness of breath,     #Acute on chronic CHF exacerbation  #Acute hypoxic respiratory failure   #H/o cholecystitis   #CKD  #DM  #Chronic Afib  #HTN  #Anemia    - presented due to SOB; s/p bumex 2mg IV in ED and placed on BiPAP; appears to be improving overall; no longer on BiPAP > now on 1LNC, wean as tolerated   - bumex changed to 1mg IV BID per cardio recs (patient is on bumex 1mg PO daily at home)  - strict I/O; daily weight    - GI recs appreciated; original plan for ERCP 4/26 but postponed due to being on oxygen; wean off O2 as tolerated and f/u GI for procedure as inpatient when able to; patient was originally scheduled as outpatient 4/26  - hold eliquis for now for possible procedure; heparin drip for now.  - anemia panel noted; appears iron deficient; iron supplement added on; completed course of IV iron per nephro recs   - bowel regimen.  - PT eval recs outpatient PT    - GOC discussion; DNR/DNI; MOLST drafted and placed in chart   - patient still on O2; CT chest done showing pulm edema still; s/p additional IV bumex on 5/2 and 5/4; f/u cardio regarding further adjustment of meds   - attempt to wean off O2; if not able to be weaned, can obtain ambu sats and see if patient requires home O2 prior to DC  - GI f/u for ERCP while inpatient if possible; daughter would prefer procedure to be done while inpatient   - aldactone added on 5/3; creatinine function slightly increased to 1.85 from 1.6; appears stable this morning; continue to monitor; nephro following   - insulin adjusted due to elevated blood sugars  - aranesp per nephro recs   - developed chest pain this afternoon; EKG done and reviewed with no ischemic changes noted; trops obtained negative; ECHO ordered to be done; currently on heparin drip; cardio f/u    Rest as above. Discussed with HS.

## 2023-05-05 NOTE — PROGRESS NOTE ADULT - SUBJECTIVE AND OBJECTIVE BOX
DATE OF SERVICE: 05-05-23 @ 16:40    Patient is a 83y old  Female who presents with a chief complaint of Shortness of breath (05 May 2023 09:31)      INTERVAL HISTORY: Feels better bow. Experienced SOB and CP earlier.     REVIEW OF SYSTEMS:  CONSTITUTIONAL: No weakness  EYES/ENT: No visual changes;  No throat pain   NECK: No pain or stiffness  RESPIRATORY: No cough, wheezing; No shortness of breath  CARDIOVASCULAR: No chest pain or palpitations  GASTROINTESTINAL: No abdominal  pain. No nausea, vomiting, or hematemesis  GENITOURINARY: No dysuria, frequency or hematuria  NEUROLOGICAL: No stroke like symptoms  SKIN: No rashes    TELEMETRY Personally reviewed: SR 60-70  	  MEDICATIONS:  buMETAnide Injectable 1 milliGRAM(s) IV Push two times a day  metoprolol tartrate 100 milliGRAM(s) Oral every 12 hours  spironolactone 12.5 milliGRAM(s) Oral daily        PHYSICAL EXAM:  T(C): 36.6 (05-05-23 @ 11:37), Max: 36.8 (05-05-23 @ 00:08)  HR: 71 (05-05-23 @ 14:38) (66 - 71)  BP: 102/71 (05-05-23 @ 14:38) (102/71 - 154/85)  RR: 18 (05-05-23 @ 14:38) (17 - 18)  SpO2: 96% (05-05-23 @ 14:38) (96% - 99%)  Wt(kg): --  I&O's Summary    04 May 2023 07:01  -  05 May 2023 07:00  --------------------------------------------------------  IN: 610 mL / OUT: 2300 mL / NET: -1690 mL    05 May 2023 07:01  -  05 May 2023 16:40  --------------------------------------------------------  IN: 630 mL / OUT: 400 mL / NET: 230 mL          Appearance: In no distress	  HEENT:    PERRL, EOMI	  Cardiovascular:  S1 S2, No JVD  Respiratory: diminished B/L	  Gastrointestinal:  Soft, Non-tender, + BS	  Vascularature:  No edema of LE  Psychiatric: Appropriate affect   Neuro: no acute focal deficits                               9.7    8.40  )-----------( 263      ( 05 May 2023 06:21 )             33.2     05-05    140  |  94<L>  |  35<H>  ----------------------------<  197<H>  3.8   |  34<H>  |  1.84<H>    Ca    9.6      05 May 2023 06:23  Phos  4.4     05-05  Mg     1.9     05-05          Labs personally reviewed            ASSESSMENT/PLAN: 	    Ms. Batista is a 84 YO woman with PMH of HTN, HLD, DM2, HFpEF (EF 65%), CAD s/p CABG, Breast CA s/p R partial mastectomy, rectal CA s/p resection with recent admission for acute rissa s/p ERCP w/ stent c/b gallbladder perf w/ perc rissa and recurrent SVT, presenting for shortness of breath. Denies CP, palpitations, orthopnea or syncope. Follows as OP with Dr. Hagan.    Problem/Plan -1  Problem: Acute on Chronic Diastolic CHF  - POCUS suggestive of pulm edema  - CXR with no pulm edema or effusion. Prior CT one month ago with moderate effusions and pulm edema  - proBNP 2505  - Prior TTE 3/23 shows preserved EF, hyperdynamic LV function, basal inferoseptum hypokinesis, decreased RV systolic function severe pulm pressures, B/L pleural effusions  - repeat proBNP slightly higher at 2663   - Strict I&Os, daily weights  - Wean supplemental oxygen as tolerated, still hypoxic on RA but improved  - Continue Bumex to 1mg IV BID  - Initiated Good 25mg daily     Problem/Plan -2  Problem: CAD   - s/p CABG 2020  - ECG with no ischemia noted  - Trop 21  - c/w ASA, metoprolol and statin  - Prior TTE 3/23 shows preserved EF, hyperdynamic LV function, basal inferoseptal hypokinesis, decreased RV systolic function severe pulm pressures   - 5/5 c/o CP/SOB: trop/CKMB/ECG wnl. TTE pending.     Problem/Plan -3  Problem: hx of Recurrent SVT  - c/w Metoprolol 100mg PO BID  - cont to monitor on tele    Problem/Plan -4  Problem: Atrial Fibrillation  - Patient with n/o A-flutter on previous admission  - c/w Metoprolol   - eliquis 2.5mg PO BID on hold   - c/w Heparin gtt    Problem/Plan- 5  Problem: Hx of Cholecystitis  - ERCP on hold pending improvement in respiratory function          Delaney Vasquez, FABIOLA-NP   Sergey Winchester DO [facility]  Cardiovascular Medicine  [address] [place] [address], [address]  Available through call or text on [employer]  Office: [phone]

## 2023-05-05 NOTE — PROGRESS NOTE ADULT - PROBLEM SELECTOR PLAN 1
ProBNP 2505, TTE 3/23 with EF 76%, severe pulmonary hypertension, moderate diastolic dysfunction. POCUS done in ED cw pulmonary edema  - CT chest 5/1 with unchanged pulmonary edema/effusions  - c/w bumex 1mg IV BID   - c/w aldactone 12.5mg daily   - Strict I&Os  - Daily weights    - at baseline weight but clinically appears more overloaded today, discussed with nephro, will give additional bumex 1mg IV x1 ProBNP 2505, TTE 3/23 with EF 76%, severe pulmonary hypertension, moderate diastolic dysfunction. POCUS done in ED cw pulmonary edema  - CT chest 5/1 with unchanged pulmonary edema/effusions  - c/w bumex 1mg IV BID   - c/w aldactone 12.5mg daily   - Strict I&Os  - Daily weights

## 2023-05-05 NOTE — PROGRESS NOTE ADULT - ASSESSMENT
Ms. Batista is a 82 YO woman with PMH of HTN, HLD, T2DM, HFpEF (EF 65%), CAD s/p CABG, Breast CA s/p R partial mastectomy, rectal CA s/p resection with recent admission for acute rissa s/p ERCP w/ stent c/b gallbladder perf w/ perc rissa and recurrent SVT, presenting for shortness of breath, admitted for HF exacerbation. Remains on O2 despite continued diuresis.

## 2023-05-06 LAB
ANION GAP SERPL CALC-SCNC: 13 MMOL/L — SIGNIFICANT CHANGE UP (ref 5–17)
APTT BLD: 72.5 SEC — HIGH (ref 27.5–35.5)
BUN SERPL-MCNC: 33 MG/DL — HIGH (ref 7–23)
CALCIUM SERPL-MCNC: 9.7 MG/DL — SIGNIFICANT CHANGE UP (ref 8.4–10.5)
CHLORIDE SERPL-SCNC: 97 MMOL/L — SIGNIFICANT CHANGE UP (ref 96–108)
CO2 SERPL-SCNC: 29 MMOL/L — SIGNIFICANT CHANGE UP (ref 22–31)
CREAT SERPL-MCNC: 1.85 MG/DL — HIGH (ref 0.5–1.3)
EGFR: 26 ML/MIN/1.73M2 — LOW
GLUCOSE BLDC GLUCOMTR-MCNC: 197 MG/DL — HIGH (ref 70–99)
GLUCOSE BLDC GLUCOMTR-MCNC: 235 MG/DL — HIGH (ref 70–99)
GLUCOSE BLDC GLUCOMTR-MCNC: 245 MG/DL — HIGH (ref 70–99)
GLUCOSE BLDC GLUCOMTR-MCNC: 340 MG/DL — HIGH (ref 70–99)
GLUCOSE SERPL-MCNC: 273 MG/DL — HIGH (ref 70–99)
HCT VFR BLD CALC: 32.2 % — LOW (ref 34.5–45)
HGB BLD-MCNC: 9.2 G/DL — LOW (ref 11.5–15.5)
MAGNESIUM SERPL-MCNC: 1.7 MG/DL — SIGNIFICANT CHANGE UP (ref 1.6–2.6)
MCHC RBC-ENTMCNC: 27.1 PG — SIGNIFICANT CHANGE UP (ref 27–34)
MCHC RBC-ENTMCNC: 28.6 GM/DL — LOW (ref 32–36)
MCV RBC AUTO: 95 FL — SIGNIFICANT CHANGE UP (ref 80–100)
NRBC # BLD: 0 /100 WBCS — SIGNIFICANT CHANGE UP (ref 0–0)
NT-PROBNP SERPL-SCNC: 1356 PG/ML — HIGH (ref 0–300)
PHOSPHATE SERPL-MCNC: 4.1 MG/DL — SIGNIFICANT CHANGE UP (ref 2.5–4.5)
PLATELET # BLD AUTO: 272 K/UL — SIGNIFICANT CHANGE UP (ref 150–400)
POTASSIUM SERPL-MCNC: 4.2 MMOL/L — SIGNIFICANT CHANGE UP (ref 3.5–5.3)
POTASSIUM SERPL-SCNC: 4.2 MMOL/L — SIGNIFICANT CHANGE UP (ref 3.5–5.3)
RBC # BLD: 3.39 M/UL — LOW (ref 3.8–5.2)
RBC # FLD: 18.9 % — HIGH (ref 10.3–14.5)
SODIUM SERPL-SCNC: 139 MMOL/L — SIGNIFICANT CHANGE UP (ref 135–145)
WBC # BLD: 9.76 K/UL — SIGNIFICANT CHANGE UP (ref 3.8–10.5)
WBC # FLD AUTO: 9.76 K/UL — SIGNIFICANT CHANGE UP (ref 3.8–10.5)

## 2023-05-06 PROCEDURE — 99233 SBSQ HOSP IP/OBS HIGH 50: CPT | Mod: GC

## 2023-05-06 PROCEDURE — 76376 3D RENDER W/INTRP POSTPROCES: CPT | Mod: 26

## 2023-05-06 PROCEDURE — 93306 TTE W/DOPPLER COMPLETE: CPT | Mod: 26

## 2023-05-06 RX ORDER — MAGNESIUM OXIDE 400 MG ORAL TABLET 241.3 MG
400 TABLET ORAL ONCE
Refills: 0 | Status: COMPLETED | OUTPATIENT
Start: 2023-05-06 | End: 2023-05-06

## 2023-05-06 RX ADMIN — GABAPENTIN 200 MILLIGRAM(S): 400 CAPSULE ORAL at 11:52

## 2023-05-06 RX ADMIN — HEPARIN SODIUM 1200 UNIT(S)/HR: 5000 INJECTION INTRAVENOUS; SUBCUTANEOUS at 19:13

## 2023-05-06 RX ADMIN — Medication 650 MILLIGRAM(S): at 11:52

## 2023-05-06 RX ADMIN — POLYETHYLENE GLYCOL 3350 17 GRAM(S): 17 POWDER, FOR SOLUTION ORAL at 11:53

## 2023-05-06 RX ADMIN — ATORVASTATIN CALCIUM 40 MILLIGRAM(S): 80 TABLET, FILM COATED ORAL at 21:38

## 2023-05-06 RX ADMIN — Medication 81 MILLIGRAM(S): at 11:52

## 2023-05-06 RX ADMIN — Medication 100 MILLIGRAM(S): at 05:36

## 2023-05-06 RX ADMIN — MAGNESIUM OXIDE 400 MG ORAL TABLET 400 MILLIGRAM(S): 241.3 TABLET ORAL at 11:53

## 2023-05-06 RX ADMIN — BUMETANIDE 1 MILLIGRAM(S): 0.25 INJECTION INTRAMUSCULAR; INTRAVENOUS at 14:12

## 2023-05-06 RX ADMIN — SENNA PLUS 2 TABLET(S): 8.6 TABLET ORAL at 21:38

## 2023-05-06 RX ADMIN — CHLORHEXIDINE GLUCONATE 1 APPLICATION(S): 213 SOLUTION TOPICAL at 05:40

## 2023-05-06 RX ADMIN — Medication 2: at 11:51

## 2023-05-06 RX ADMIN — PANTOPRAZOLE SODIUM 40 MILLIGRAM(S): 20 TABLET, DELAYED RELEASE ORAL at 05:36

## 2023-05-06 RX ADMIN — Medication 5 UNIT(S): at 17:33

## 2023-05-06 RX ADMIN — Medication 5 UNIT(S): at 11:51

## 2023-05-06 RX ADMIN — Medication 2: at 17:32

## 2023-05-06 RX ADMIN — Medication 5 UNIT(S): at 08:13

## 2023-05-06 RX ADMIN — Medication 3 MILLILITER(S): at 01:12

## 2023-05-06 RX ADMIN — Medication 325 MILLIGRAM(S): at 11:52

## 2023-05-06 RX ADMIN — SPIRONOLACTONE 25 MILLIGRAM(S): 25 TABLET, FILM COATED ORAL at 05:39

## 2023-05-06 RX ADMIN — Medication 3 MILLILITER(S): at 23:37

## 2023-05-06 RX ADMIN — HEPARIN SODIUM 1200 UNIT(S)/HR: 5000 INJECTION INTRAVENOUS; SUBCUTANEOUS at 07:41

## 2023-05-06 RX ADMIN — INSULIN GLARGINE 10 UNIT(S): 100 INJECTION, SOLUTION SUBCUTANEOUS at 21:38

## 2023-05-06 RX ADMIN — Medication 100 MILLIGRAM(S): at 17:31

## 2023-05-06 RX ADMIN — Medication 3 MILLILITER(S): at 05:39

## 2023-05-06 RX ADMIN — Medication 1 TABLET(S): at 11:53

## 2023-05-06 RX ADMIN — HEPARIN SODIUM 1200 UNIT(S)/HR: 5000 INJECTION INTRAVENOUS; SUBCUTANEOUS at 07:49

## 2023-05-06 RX ADMIN — Medication 4: at 08:13

## 2023-05-06 RX ADMIN — Medication 3 MILLILITER(S): at 17:33

## 2023-05-06 RX ADMIN — Medication 3 MILLILITER(S): at 11:53

## 2023-05-06 RX ADMIN — BUMETANIDE 1 MILLIGRAM(S): 0.25 INJECTION INTRAMUSCULAR; INTRAVENOUS at 05:36

## 2023-05-06 NOTE — PROGRESS NOTE ADULT - PROBLEM SELECTOR PLAN 2
- Currently on 1L NC  - Will attempt to wean to room air  - Not on home O2  - Duonebs q6 for wheezes  - Incentive spirometry

## 2023-05-06 NOTE — PROGRESS NOTE ADULT - ATTENDING COMMENTS
84 YO woman with PMH of HTN, HLD, DM2, HFpEF (EF 65%), CAD s/p CABG, Breast CA s/p R partial mastectomy, rectal CA s/p resection with recent admission for acute rissa s/p ERCP w/ stent c/b gallbladder perf w/ perc rissa and recurrent SVT, presenting for shortness of breath,     #Acute on chronic CHF exacerbation  #Acute hypoxic respiratory failure   #H/o cholecystitis   #CKD  #DM  #Chronic Afib  #HTN  #Anemia    - presented due to SOB; s/p bumex 2mg IV in ED and placed on BiPAP; appears to be improving overall; no longer on BiPAP > now on 1LNC, wean as tolerated   - bumex changed to 1mg IV BID per cardio recs (patient is on bumex 1mg PO daily at home)  - strict I/O; daily weight    - GI recs appreciated; original plan for ERCP 4/26 but postponed due to being on oxygen; wean off O2 as tolerated and f/u GI for procedure as inpatient when able to; patient was originally scheduled as outpatient 4/26  - hold eliquis for now for possible procedure; heparin drip for now.  - anemia panel noted; appears iron deficient; iron supplement added on; completed course of IV iron per nephro recs   - bowel regimen.  - PT eval recs outpatient PT    - GOC discussion; DNR/DNI; MOLST drafted and placed in chart   - patient still on O2; CT chest done showing pulm edema still; s/p additional IV bumex on 5/2 and 5/4; f/u cardio regarding further adjustment of meds   - attempt to wean off O2; if not able to be weaned, can obtain ambu sats and see if patient requires home O2 prior to DC  - GI f/u for ERCP while inpatient if possible; daughter would prefer procedure to be done while inpatient   - aldactone added on 5/3; creatinine function slightly increased to 1.85 from 1.6; appears stable this morning; aldactone dosing increased from 12.5 to 25 per cardio recs; continue to monitor; nephro following   - insulin adjusted due to elevated blood sugars  - aranesp per nephro recs   - developed chest pain on 5/5 afternoon; EKG done and reviewed with no ischemic changes noted; trops obtained negative; ECHO ordered to be done; currently on heparin drip; cardio f/u  - having some shoulder pain this morning in left arm; possibly similar to pain noted yesterday; unlikely cardiac cause; will obtain shoulder xray to further evaluate     Rest as above. Discussed with HS. 82 YO woman with PMH of HTN, HLD, DM2, HFpEF (EF 65%), CAD s/p CABG, Breast CA s/p R partial mastectomy, rectal CA s/p resection with recent admission for acute rissa s/p ERCP w/ stent c/b gallbladder perf w/ perc rissa and recurrent SVT, presenting for shortness of breath,     #Acute on chronic CHF exacerbation  #Acute hypoxic respiratory failure   #H/o cholecystitis   #CKD  #DM  #Chronic Afib  #HTN  #Anemia    - presented due to SOB; s/p bumex 2mg IV in ED and placed on BiPAP; appears to be improving overall; no longer on BiPAP > now on 1LNC, wean as tolerated   - bumex changed to 1mg IV BID per cardio recs (patient is on bumex 1mg PO daily at home)  - strict I/O; daily weight    - GI recs appreciated; original plan for ERCP 4/26 but postponed due to being on oxygen; wean off O2 as tolerated and f/u GI for procedure as inpatient when able to; patient was originally scheduled as outpatient 4/26  - hold eliquis for now for possible procedure; heparin drip for now.  - anemia panel noted; appears iron deficient; iron supplement added on; completed course of IV iron per nephro recs   - bowel regimen.  - PT eval recs outpatient PT    - GOC discussion; DNR/DNI; MOLST drafted and placed in chart   - patient still on O2; CT chest done showing pulm edema still; s/p additional IV bumex on 5/2 and 5/4; f/u cardio regarding further adjustment of meds   - attempt to wean off O2; if not able to be weaned, can obtain ambu sats and see if patient requires home O2 prior to DC  - GI f/u for ERCP while inpatient if possible; daughter would prefer procedure to be done while inpatient   - aldactone added on 5/3; creatinine function slightly increased to 1.85 from 1.6; appears stable this morning; aldactone dosing increased from 12.5 to 25 per cardio recs; continue to monitor; nephro following   - insulin adjusted due to elevated blood sugars  - aranesp per nephro recs   - developed chest pain on 5/5 afternoon; EKG done and reviewed with no ischemic changes noted; trops obtained negative; ECHO ordered to be done; currently on heparin drip; cardio f/u  - having some shoulder pain this morning in left arm; possibly similar to pain noted yesterday; unlikely cardiac cause; will obtain shoulder xray to further evaluate     Rest as above. Discussed with HS

## 2023-05-06 NOTE — PROGRESS NOTE ADULT - SUBJECTIVE AND OBJECTIVE BOX
Patient is a 83y old  Female who presents with a chief complaint of Shortness of breath (05 May 2023 16:40)      SUBJECTIVE / OVERNIGHT EVENTS:    MEDICATIONS  (STANDING):  albuterol/ipratropium for Nebulization 3 milliLiter(s) Nebulizer every 6 hours  aspirin  chewable 81 milliGRAM(s) Oral daily  atorvastatin 40 milliGRAM(s) Oral at bedtime  buMETAnide Injectable 1 milliGRAM(s) IV Push two times a day  chlorhexidine 2% Cloths 1 Application(s) Topical <User Schedule>  darbepoetin Injectable ViaL 40 MICROGram(s) SubCutaneous every 7 days  dextrose 5%. 1000 milliLiter(s) (100 mL/Hr) IV Continuous <Continuous>  dextrose 5%. 1000 milliLiter(s) (50 mL/Hr) IV Continuous <Continuous>  dextrose 50% Injectable 25 Gram(s) IV Push once  dextrose 50% Injectable 12.5 Gram(s) IV Push once  dextrose 50% Injectable 25 Gram(s) IV Push once  ferrous    sulfate 325 milliGRAM(s) Oral daily  gabapentin 200 milliGRAM(s) Oral daily  glucagon  Injectable 1 milliGRAM(s) IntraMuscular once  heparin  Infusion. 1200 Unit(s)/Hr (12 mL/Hr) IV Continuous <Continuous>  insulin glargine Injectable (LANTUS) 10 Unit(s) SubCutaneous at bedtime  insulin lispro (ADMELOG) corrective regimen sliding scale   SubCutaneous at bedtime  insulin lispro (ADMELOG) corrective regimen sliding scale   SubCutaneous three times a day before meals  insulin lispro Injectable (ADMELOG) 5 Unit(s) SubCutaneous three times a day before meals  metoprolol tartrate 100 milliGRAM(s) Oral every 12 hours  Nephro-ashley 1 Tablet(s) Oral daily  pantoprazole    Tablet 40 milliGRAM(s) Oral before breakfast  polyethylene glycol 3350 17 Gram(s) Oral daily  senna 2 Tablet(s) Oral at bedtime  sodium bicarbonate 650 milliGRAM(s) Oral daily  spironolactone 25 milliGRAM(s) Oral daily    MEDICATIONS  (PRN):  acetaminophen     Tablet .. 650 milliGRAM(s) Oral every 6 hours PRN Temp greater or equal to 38C (100.4F), Mild Pain (1 - 3)  dextrose Oral Gel 15 Gram(s) Oral once PRN Blood Glucose LESS THAN 70 milliGRAM(s)/deciliter  melatonin 3 milliGRAM(s) Oral at bedtime PRN Insomnia      CAPILLARY BLOOD GLUCOSE      POCT Blood Glucose.: 294 mg/dL (05 May 2023 21:23)  POCT Blood Glucose.: 173 mg/dL (05 May 2023 17:34)  POCT Blood Glucose.: 189 mg/dL (05 May 2023 11:25)  POCT Blood Glucose.: 265 mg/dL (05 May 2023 08:40)    I&O's Summary    05 May 2023 07:01  -  06 May 2023 07:00  --------------------------------------------------------  IN: 1230 mL / OUT: 400 mL / NET: 830 mL        Vital Signs Last 24 Hrs  T(C): 36.3 (06 May 2023 04:00), Max: 36.7 (05 May 2023 20:15)  T(F): 97.4 (06 May 2023 04:00), Max: 98.1 (05 May 2023 20:15)  HR: 76 (06 May 2023 04:00) (66 - 76)  BP: 120/78 (06 May 2023 04:00) (102/71 - 154/85)  BP(mean): --  RR: 18 (06 May 2023 04:00) (18 - 18)  SpO2: 96% (06 May 2023 04:00) (95% - 98%)    Parameters below as of 06 May 2023 04:00  Patient On (Oxygen Delivery Method): nasal cannula  O2 Flow (L/min): 1      PHYSICAL EXAM:  GENERAL: Sitting in bed in no acute distress  EYES: Conjunctiva noninjected or pale, sclera anicteric  HENT: NC/AT, moist mucous membranes  NECK: Supple, trachea midline  LUNG: CTAB. On 1L nasal cannula, though removed during conversation with no increased work of breathing  CV: RRR, Pulses- Radial: 2+ b/l.   ABDOMEN: Nondistended, nontender  MSK: No visible deformities, nontender extremities. R arm with chronic lymphedema, no pitting edema noted in any other extremity.   SKIN: No rashes, bruises  NEURO: AAOx4 (to person, place, time, event), no tremor    LABS:                        9.7    8.40  )-----------( 263      ( 05 May 2023 06:21 )             33.2      05-05    140  |  94<L>  |  35<H>  ----------------------------<  197<H>  3.8   |  34<H>  |  1.84<H>    Ca    9.6      05 May 2023 06:23  Phos  4.4     05-05  Mg     1.9     05-05      PTT - ( 05 May 2023 06:21 )  PTT:91.6 sec  CARDIAC MARKERS ( 05 May 2023 15:13 )  x     / x     / 17 U/L / x     / 1.2 ng/mL          RADIOLOGY & ADDITIONAL TESTS:    Imaging Personally Reviewed:    Consultant(s) Notes Reviewed:      Care Discussed with Consultants/Other Providers:   Patient is a 83y old  Female who presents with a chief complaint of Shortness of breath (05 May 2023 16:40)      SUBJECTIVE / OVERNIGHT EVENTS: Had chest pain yesterday evening and into overnight 8/10 in midsternum and R shoulder - EKG and cardiac enzymes normal. Patient seen and examined at bedside, off nasal cannula, feels "okay," chest pain has resolved.     MEDICATIONS  (STANDING):  albuterol/ipratropium for Nebulization 3 milliLiter(s) Nebulizer every 6 hours  aspirin  chewable 81 milliGRAM(s) Oral daily  atorvastatin 40 milliGRAM(s) Oral at bedtime  buMETAnide Injectable 1 milliGRAM(s) IV Push two times a day  chlorhexidine 2% Cloths 1 Application(s) Topical <User Schedule>  darbepoetin Injectable ViaL 40 MICROGram(s) SubCutaneous every 7 days  dextrose 5%. 1000 milliLiter(s) (100 mL/Hr) IV Continuous <Continuous>  dextrose 5%. 1000 milliLiter(s) (50 mL/Hr) IV Continuous <Continuous>  dextrose 50% Injectable 25 Gram(s) IV Push once  dextrose 50% Injectable 12.5 Gram(s) IV Push once  dextrose 50% Injectable 25 Gram(s) IV Push once  ferrous    sulfate 325 milliGRAM(s) Oral daily  gabapentin 200 milliGRAM(s) Oral daily  glucagon  Injectable 1 milliGRAM(s) IntraMuscular once  heparin  Infusion. 1200 Unit(s)/Hr (12 mL/Hr) IV Continuous <Continuous>  insulin glargine Injectable (LANTUS) 10 Unit(s) SubCutaneous at bedtime  insulin lispro (ADMELOG) corrective regimen sliding scale   SubCutaneous at bedtime  insulin lispro (ADMELOG) corrective regimen sliding scale   SubCutaneous three times a day before meals  insulin lispro Injectable (ADMELOG) 5 Unit(s) SubCutaneous three times a day before meals  metoprolol tartrate 100 milliGRAM(s) Oral every 12 hours  Nephro-ashley 1 Tablet(s) Oral daily  pantoprazole    Tablet 40 milliGRAM(s) Oral before breakfast  polyethylene glycol 3350 17 Gram(s) Oral daily  senna 2 Tablet(s) Oral at bedtime  sodium bicarbonate 650 milliGRAM(s) Oral daily  spironolactone 25 milliGRAM(s) Oral daily    MEDICATIONS  (PRN):  acetaminophen     Tablet .. 650 milliGRAM(s) Oral every 6 hours PRN Temp greater or equal to 38C (100.4F), Mild Pain (1 - 3)  dextrose Oral Gel 15 Gram(s) Oral once PRN Blood Glucose LESS THAN 70 milliGRAM(s)/deciliter  melatonin 3 milliGRAM(s) Oral at bedtime PRN Insomnia      CAPILLARY BLOOD GLUCOSE      POCT Blood Glucose.: 294 mg/dL (05 May 2023 21:23)  POCT Blood Glucose.: 173 mg/dL (05 May 2023 17:34)  POCT Blood Glucose.: 189 mg/dL (05 May 2023 11:25)  POCT Blood Glucose.: 265 mg/dL (05 May 2023 08:40)    I&O's Summary    05 May 2023 07:01  -  06 May 2023 07:00  --------------------------------------------------------  IN: 1230 mL / OUT: 400 mL / NET: 830 mL        Vital Signs Last 24 Hrs  T(C): 36.3 (06 May 2023 04:00), Max: 36.7 (05 May 2023 20:15)  T(F): 97.4 (06 May 2023 04:00), Max: 98.1 (05 May 2023 20:15)  HR: 76 (06 May 2023 04:00) (66 - 76)  BP: 120/78 (06 May 2023 04:00) (102/71 - 154/85)  BP(mean): --  RR: 18 (06 May 2023 04:00) (18 - 18)  SpO2: 96% (06 May 2023 04:00) (95% - 98%)    Parameters below as of 06 May 2023 04:00  Patient On (Oxygen Delivery Method): nasal cannula  O2 Flow (L/min): 1      PHYSICAL EXAM:  GENERAL: Sitting in bed in no acute distress  EYES: Conjunctiva noninjected or pale, sclera anicteric  HENT: NC/AT, moist mucous membranes  NECK: Supple, trachea midline  LUNG: CTAB. On 1L nasal cannula, though removed during conversation with no increased work of breathing  CV: RRR, Pulses- Radial: 2+ b/l.   ABDOMEN: Nondistended, nontender  MSK: No visible deformities, nontender extremities. R arm with chronic lymphedema, no pitting edema noted in any other extremity.   SKIN: No rashes, bruises  NEURO: AAOx4 (to person, place, time, event), no tremor    LABS:                        9.7    8.40  )-----------( 263      ( 05 May 2023 06:21 )             33.2      05-05    140  |  94<L>  |  35<H>  ----------------------------<  197<H>  3.8   |  34<H>  |  1.84<H>    Ca    9.6      05 May 2023 06:23  Phos  4.4     05-05  Mg     1.9     05-05      PTT - ( 05 May 2023 06:21 )  PTT:91.6 sec  CARDIAC MARKERS ( 05 May 2023 15:13 )  x     / x     / 17 U/L / x     / 1.2 ng/mL          RADIOLOGY & ADDITIONAL TESTS:    Imaging Personally Reviewed:    Consultant(s) Notes Reviewed:      Care Discussed with Consultants/Other Providers:

## 2023-05-06 NOTE — PROGRESS NOTE ADULT - SUBJECTIVE AND OBJECTIVE BOX
DATE OF SERVICE: 05-06-23 @ 13:50    Patient is a 83y old  Female who presents with a chief complaint of Shortness of breath (06 May 2023 07:13)      INTERVAL HISTORY: Feels well, laying comfortably denies SOB    REVIEW OF SYSTEMS:  CONSTITUTIONAL: No weakness  EYES/ENT: No visual changes;  No throat pain   NECK: No pain or stiffness  RESPIRATORY: No cough, wheezing; No shortness of breath  CARDIOVASCULAR: No chest pain or palpitations  GASTROINTESTINAL: No abdominal  pain. No nausea, vomiting, or hematemesis  GENITOURINARY: No dysuria, frequency or hematuria  NEUROLOGICAL: No stroke like symptoms  SKIN: No rashes    TELEMETRY Personally reviewed: SR   	  MEDICATIONS:  buMETAnide Injectable 1 milliGRAM(s) IV Push two times a day  metoprolol tartrate 100 milliGRAM(s) Oral every 12 hours  spironolactone 25 milliGRAM(s) Oral daily        PHYSICAL EXAM:  T(C): 36.9 (05-06-23 @ 11:17), Max: 36.9 (05-06-23 @ 11:17)  HR: 72 (05-06-23 @ 11:17) (68 - 76)  BP: 118/72 (05-06-23 @ 11:17) (102/71 - 144/56)  RR: 18 (05-06-23 @ 11:17) (18 - 18)  SpO2: 97% (05-06-23 @ 11:17) (85% - 98%)  Wt(kg): --  I&O's Summary    05 May 2023 07:01  -  06 May 2023 07:00  --------------------------------------------------------  IN: 1230 mL / OUT: 400 mL / NET: 830 mL    06 May 2023 07:01  -  06 May 2023 13:50  --------------------------------------------------------  IN: 480 mL / OUT: 900 mL / NET: -420 mL          Appearance: In no distress	  HEENT:    PERRL, EOMI	  Cardiovascular:  S1 S2, No JVD  Respiratory: Lungs clear to auscultation	  Gastrointestinal:  Soft, Non-tender, + BS	  Vascularature:  No edema of LE  Psychiatric: Appropriate affect   Neuro: no acute focal deficits                               9.2    9.76  )-----------( 272      ( 06 May 2023 07:05 )             32.2     05-06    139  |  97  |  33<H>  ----------------------------<  273<H>  4.2   |  29  |  1.85<H>    Ca    9.7      06 May 2023 07:05  Phos  4.1     05-06  Mg     1.7     05-06          Labs personally reviewed      ASSESSMENT/PLAN: 	  Ms. Batista is a 82 YO woman with PMH of HTN, HLD, DM2, HFpEF (EF 65%), CAD s/p CABG, Breast CA s/p R partial mastectomy, rectal CA s/p resection with recent admission for acute rissa s/p ERCP w/ stent c/b gallbladder perf w/ perc rissa and recurrent SVT, presenting for shortness of breath. Denies CP, palpitations, orthopnea or syncope. Follows as OP with Dr. Hagan.    Problem/Plan -1  Problem: Acute on Chronic Diastolic CHF  - POCUS suggestive of pulm edema  - CXR with no pulm edema or effusion. Prior CT one month ago with moderate effusions and pulm edema  - proBNP 2505  - Prior TTE 3/23 shows preserved EF, hyperdynamic LV function, basal inferoseptum hypokinesis, decreased RV systolic function severe pulm pressures, B/L pleural effusions  - repeat proBNP slightly higher at 2663   - Strict I&Os, daily weights  - Wean supplemental oxygen as tolerated, still hypoxic on RA but improved  - Continue Bumex to 1mg IV BID  - Initiated Crawford 25mg daily     Problem/Plan -2  Problem: CAD   - s/p CABG 2020  - ECG with no ischemia noted  - Trop 21  - c/w ASA, metoprolol and statin  - Prior TTE 3/23 shows preserved EF, hyperdynamic LV function, basal inferoseptal hypokinesis, decreased RV systolic function severe pulm pressures   - 5/5 c/o CP/SOB: trop/CKMB/ECG wnl. TTE pending.     Problem/Plan -3  Problem: hx of Recurrent SVT  - c/w Metoprolol 100mg PO BID  - cont to monitor on tele    Problem/Plan -4  Problem: Atrial Fibrillation  - Patient with n/o A-flutter on previous admission  - c/w Metoprolol   - eliquis 2.5mg PO BID on hold   - c/w Heparin gtt    Problem/Plan- 5  Problem: Hx of Cholecystitis  - ERCP on hold pending improvement in respiratory function                  Ana Maria Scruggs, NARGIS Winchester, DO [facility]  Cardiovascular Medicine  [address] [place] [address], [address]  Available through call or text on [employer]  Office: [phone]

## 2023-05-06 NOTE — PROGRESS NOTE ADULT - PROBLEM SELECTOR PLAN 1
ProBNP 2505, TTE 3/23 with EF 76%, severe pulmonary hypertension, moderate diastolic dysfunction. POCUS done in ED cw pulmonary edema  - CT chest 5/1 with unchanged pulmonary edema/effusions  - c/w bumex 1mg IV BID   - c/w aldactone 12.5mg daily   - Strict I&Os  - Daily weights

## 2023-05-06 NOTE — PROGRESS NOTE ADULT - ASSESSMENT
Ms. Batista is a 84 YO woman with PMH of HTN, HLD, T2DM, HFpEF (EF 65%), CAD s/p CABG, Breast CA s/p R partial mastectomy, rectal CA s/p resection with recent admission for acute rissa s/p ERCP w/ stent c/b gallbladder perf w/ perc rissa and recurrent SVT, presenting for shortness of breath, admitted for HF exacerbation. Remains on O2 despite continued diuresis.

## 2023-05-07 LAB
ALBUMIN SERPL ELPH-MCNC: 3.6 G/DL — SIGNIFICANT CHANGE UP (ref 3.3–5)
ALP SERPL-CCNC: 90 U/L — SIGNIFICANT CHANGE UP (ref 40–120)
ALT FLD-CCNC: 15 U/L — SIGNIFICANT CHANGE UP (ref 10–45)
ANION GAP SERPL CALC-SCNC: 12 MMOL/L — SIGNIFICANT CHANGE UP (ref 5–17)
APTT BLD: 64 SEC — HIGH (ref 27.5–35.5)
AST SERPL-CCNC: 17 U/L — SIGNIFICANT CHANGE UP (ref 10–40)
BILIRUB SERPL-MCNC: 0.4 MG/DL — SIGNIFICANT CHANGE UP (ref 0.2–1.2)
BUN SERPL-MCNC: 33 MG/DL — HIGH (ref 7–23)
CALCIUM SERPL-MCNC: 9.5 MG/DL — SIGNIFICANT CHANGE UP (ref 8.4–10.5)
CHLORIDE SERPL-SCNC: 96 MMOL/L — SIGNIFICANT CHANGE UP (ref 96–108)
CO2 SERPL-SCNC: 31 MMOL/L — SIGNIFICANT CHANGE UP (ref 22–31)
CREAT SERPL-MCNC: 1.91 MG/DL — HIGH (ref 0.5–1.3)
EGFR: 25 ML/MIN/1.73M2 — LOW
GLUCOSE BLDC GLUCOMTR-MCNC: 169 MG/DL — HIGH (ref 70–99)
GLUCOSE BLDC GLUCOMTR-MCNC: 190 MG/DL — HIGH (ref 70–99)
GLUCOSE BLDC GLUCOMTR-MCNC: 237 MG/DL — HIGH (ref 70–99)
GLUCOSE BLDC GLUCOMTR-MCNC: 258 MG/DL — HIGH (ref 70–99)
GLUCOSE SERPL-MCNC: 251 MG/DL — HIGH (ref 70–99)
HCT VFR BLD CALC: 31.7 % — LOW (ref 34.5–45)
HGB BLD-MCNC: 9.2 G/DL — LOW (ref 11.5–15.5)
MAGNESIUM SERPL-MCNC: 1.8 MG/DL — SIGNIFICANT CHANGE UP (ref 1.6–2.6)
MCHC RBC-ENTMCNC: 27.7 PG — SIGNIFICANT CHANGE UP (ref 27–34)
MCHC RBC-ENTMCNC: 29 GM/DL — LOW (ref 32–36)
MCV RBC AUTO: 95.5 FL — SIGNIFICANT CHANGE UP (ref 80–100)
NRBC # BLD: 0 /100 WBCS — SIGNIFICANT CHANGE UP (ref 0–0)
PHOSPHATE SERPL-MCNC: 3.6 MG/DL — SIGNIFICANT CHANGE UP (ref 2.5–4.5)
PLATELET # BLD AUTO: 265 K/UL — SIGNIFICANT CHANGE UP (ref 150–400)
POTASSIUM SERPL-MCNC: 4.1 MMOL/L — SIGNIFICANT CHANGE UP (ref 3.5–5.3)
POTASSIUM SERPL-SCNC: 4.1 MMOL/L — SIGNIFICANT CHANGE UP (ref 3.5–5.3)
PROT SERPL-MCNC: 7 G/DL — SIGNIFICANT CHANGE UP (ref 6–8.3)
RBC # BLD: 3.32 M/UL — LOW (ref 3.8–5.2)
RBC # FLD: 19.4 % — HIGH (ref 10.3–14.5)
SODIUM SERPL-SCNC: 139 MMOL/L — SIGNIFICANT CHANGE UP (ref 135–145)
WBC # BLD: 10.04 K/UL — SIGNIFICANT CHANGE UP (ref 3.8–10.5)
WBC # FLD AUTO: 10.04 K/UL — SIGNIFICANT CHANGE UP (ref 3.8–10.5)

## 2023-05-07 PROCEDURE — 73030 X-RAY EXAM OF SHOULDER: CPT | Mod: 26,LT

## 2023-05-07 PROCEDURE — 99232 SBSQ HOSP IP/OBS MODERATE 35: CPT | Mod: GC

## 2023-05-07 RX ORDER — BUMETANIDE 0.25 MG/ML
1 INJECTION INTRAMUSCULAR; INTRAVENOUS
Refills: 0 | Status: DISCONTINUED | OUTPATIENT
Start: 2023-05-08 | End: 2023-05-16

## 2023-05-07 RX ADMIN — Medication 100 MILLIGRAM(S): at 05:03

## 2023-05-07 RX ADMIN — Medication 3 MILLILITER(S): at 17:31

## 2023-05-07 RX ADMIN — Medication 2: at 08:10

## 2023-05-07 RX ADMIN — Medication 325 MILLIGRAM(S): at 12:05

## 2023-05-07 RX ADMIN — HEPARIN SODIUM 1200 UNIT(S)/HR: 5000 INJECTION INTRAVENOUS; SUBCUTANEOUS at 07:40

## 2023-05-07 RX ADMIN — ATORVASTATIN CALCIUM 40 MILLIGRAM(S): 80 TABLET, FILM COATED ORAL at 21:35

## 2023-05-07 RX ADMIN — Medication 3 MILLILITER(S): at 05:02

## 2023-05-07 RX ADMIN — Medication 5 UNIT(S): at 17:31

## 2023-05-07 RX ADMIN — Medication 5 UNIT(S): at 08:10

## 2023-05-07 RX ADMIN — Medication 100 MILLIGRAM(S): at 17:31

## 2023-05-07 RX ADMIN — Medication 1 TABLET(S): at 12:05

## 2023-05-07 RX ADMIN — BUMETANIDE 1 MILLIGRAM(S): 0.25 INJECTION INTRAMUSCULAR; INTRAVENOUS at 05:01

## 2023-05-07 RX ADMIN — Medication 1: at 17:30

## 2023-05-07 RX ADMIN — Medication 3 MILLILITER(S): at 12:05

## 2023-05-07 RX ADMIN — HEPARIN SODIUM 1200 UNIT(S)/HR: 5000 INJECTION INTRAVENOUS; SUBCUTANEOUS at 19:08

## 2023-05-07 RX ADMIN — Medication 650 MILLIGRAM(S): at 12:05

## 2023-05-07 RX ADMIN — SPIRONOLACTONE 25 MILLIGRAM(S): 25 TABLET, FILM COATED ORAL at 05:03

## 2023-05-07 RX ADMIN — Medication 5 UNIT(S): at 12:06

## 2023-05-07 RX ADMIN — Medication 3: at 12:05

## 2023-05-07 RX ADMIN — PANTOPRAZOLE SODIUM 40 MILLIGRAM(S): 20 TABLET, DELAYED RELEASE ORAL at 05:03

## 2023-05-07 RX ADMIN — INSULIN GLARGINE 10 UNIT(S): 100 INJECTION, SOLUTION SUBCUTANEOUS at 21:35

## 2023-05-07 RX ADMIN — CHLORHEXIDINE GLUCONATE 1 APPLICATION(S): 213 SOLUTION TOPICAL at 05:00

## 2023-05-07 RX ADMIN — Medication 81 MILLIGRAM(S): at 12:05

## 2023-05-07 RX ADMIN — Medication 3 MILLILITER(S): at 23:14

## 2023-05-07 RX ADMIN — BUMETANIDE 1 MILLIGRAM(S): 0.25 INJECTION INTRAMUSCULAR; INTRAVENOUS at 14:30

## 2023-05-07 RX ADMIN — GABAPENTIN 200 MILLIGRAM(S): 400 CAPSULE ORAL at 12:05

## 2023-05-07 NOTE — PROGRESS NOTE ADULT - SUBJECTIVE AND OBJECTIVE BOX
DATE OF SERVICE: 05-07-23 @ 09:54    Patient is a 83y old  Female who presents with a chief complaint of Shortness of breath (07 May 2023 07:40)      INTERVAL HISTORY: feels well, denies chest pain and SOB. Intermittently on NC    REVIEW OF SYSTEMS:  CONSTITUTIONAL: No weakness  EYES/ENT: No visual changes;  No throat pain   NECK: No pain or stiffness  RESPIRATORY: No cough, wheezing; No shortness of breath  CARDIOVASCULAR: No chest pain or palpitations  GASTROINTESTINAL: No abdominal  pain. No nausea, vomiting, or hematemesis  GENITOURINARY: No dysuria, frequency or hematuria  NEUROLOGICAL: No stroke like symptoms  SKIN: No rashes    TELEMETRY Personally reviewed: NS 60-90  	  MEDICATIONS:  buMETAnide Injectable 1 milliGRAM(s) IV Push two times a day  metoprolol tartrate 100 milliGRAM(s) Oral every 12 hours  spironolactone 25 milliGRAM(s) Oral daily        PHYSICAL EXAM:  T(C): 36.4 (05-07-23 @ 04:52), Max: 36.9 (05-06-23 @ 11:17)  HR: 76 (05-07-23 @ 04:52) (67 - 76)  BP: 124/76 (05-07-23 @ 04:52) (118/72 - 124/76)  RR: 18 (05-07-23 @ 04:52) (18 - 18)  SpO2: 90% (05-07-23 @ 04:52) (90% - 99%)  Wt(kg): --  I&O's Summary    06 May 2023 07:01  -  07 May 2023 07:00  --------------------------------------------------------  IN: 480 mL / OUT: 1000 mL / NET: -520 mL          Appearance: In no distress	  HEENT:    PERRL, EOMI	  Cardiovascular:  S1 S2, No JVD  Respiratory: Lungs clear to auscultation	  Gastrointestinal:  Soft, Non-tender, + BS	  Vascularature:  No edema of LE  Psychiatric: Appropriate affect   Neuro: no acute focal deficits                               9.2    10.04 )-----------( 265      ( 07 May 2023 06:49 )             31.7     05-07    139  |  96  |  33<H>  ----------------------------<  251<H>  4.1   |  31  |  1.91<H>    Ca    9.5      07 May 2023 06:49  Phos  3.6     05-07  Mg     1.8     05-07    TPro  7.0  /  Alb  3.6  /  TBili  0.4  /  DBili  x   /  AST  17  /  ALT  15  /  AlkPhos  90  05-07        Labs personally reviewed      ASSESSMENT/PLAN: 	  Ms. Batista is a 84 YO woman with PMH of HTN, HLD, DM2, HFpEF (EF 65%), CAD s/p CABG, Breast CA s/p R partial mastectomy, rectal CA s/p resection with recent admission for acute rissa s/p ERCP w/ stent c/b gallbladder perf w/ perc rissa and recurrent SVT, presenting for shortness of breath. Denies CP, palpitations, orthopnea or syncope. Follows as OP with Dr. Hagan.    Problem/Plan -1  Problem: Acute on Chronic Diastolic CHF  - POCUS suggestive of pulm edema  - CXR with no pulm edema or effusion. Prior CT one month ago with moderate effusions and pulm edema  - proBNP 2505  - Prior TTE 3/23 shows preserved EF, hyperdynamic LV function, basal inferoseptum hypokinesis, decreased RV systolic function severe pulm pressures, B/L pleural effusions  - repeat proBNP slightly higher at 2663   - Strict I&Os, daily weights  - Reports SOB, still requires supplemental O2 intermittently   - Continue Bumex to 1mg IV BID, appears close to euvolemic. Recommend switching to Bumex 1mg BID PO tomorrow   - Initiated Good 25mg daily     Problem/Plan -2  Problem: CAD   - s/p CABG 2020  - ECG with no ischemia noted  - Trop 21  - c/w ASA, metoprolol and statin  - Prior TTE 3/23 shows preserved EF, hyperdynamic LV function, basal inferoseptal hypokinesis, decreased RV systolic function severe pulm pressures   - 5/5 c/o CP/SOB: trop/CKMB/ECG wnl. TTE pending.     Problem/Plan -3  Problem: hx of Recurrent SVT  - c/w Metoprolol 100mg PO BID  - cont to monitor on tele    Problem/Plan -4  Problem: Atrial Fibrillation  - Patient with n/o A-flutter on previous admission  - c/w Metoprolol   - eliquis 2.5mg PO BID on hold   - c/w Heparin gtt    Problem/Plan- 5  Problem: Hx of Cholecystitis  - ERCP on hold pending improvement in respiratory function            Ana Maria Scruggs, ANP   Sergey Winchester, [facility]  Cardiovascular Medicine  [address] [place] [address], [address]  Available through call or text on [employer]  Office: [phone]

## 2023-05-07 NOTE — PROGRESS NOTE ADULT - ATTENDING COMMENTS
82 YO woman with PMH of HTN, HLD, DM2, HFpEF (EF 65%), CAD s/p CABG, Breast CA s/p R partial mastectomy, rectal CA s/p resection with recent admission for acute rissa s/p ERCP w/ stent c/b gallbladder perf w/ perc rissa and recurrent SVT, presenting for shortness of breath,     #Acute on chronic CHF exacerbation  #Acute hypoxic respiratory failure   #H/o cholecystitis   #CKD  #DM  #Chronic Afib  #HTN  #Anemia    - presented due to SOB; s/p bumex 2mg IV in ED and placed on BiPAP; appears to be improving overall; no longer on BiPAP > now on 1LNC, wean as tolerated   - currently on bumex 1mg IV BID per cardio recs (patient is on bumex 1mg PO daily at home); plan to transition to PO bumex 1mg BID 5/8 per cardio recs   - strict I/O; daily weight    - GI recs appreciated; original plan for ERCP 4/26 but postponed due to being on oxygen; wean off O2 as tolerated and f/u GI for procedure as inpatient when able to; patient was originally scheduled as outpatient 4/26  - hold eliquis for now for possible procedure; heparin drip for now.  - anemia panel noted; appears iron deficient; iron supplement added on; completed course of IV iron per nephro recs   - bowel regimen.  - PT eval recs outpatient PT    - GOC discussion; DNR/DNI; MOLST drafted and placed in chart   - patient still on O2; CT chest done showing pulm edema still; s/p additional IV bumex on 5/2 and 5/4; f/u cardio regarding further adjustment of meds   - attempt to wean off O2; if not able to be weaned, can obtain ambu sats and see if patient requires home O2 prior to DC  - GI f/u for ERCP while inpatient if possible; daughter would prefer procedure to be done while inpatient   - aldactone added on 5/3; creatinine function slightly increased to 1.85 from 1.6; appears stable; aldactone dosing increased from 12.5 to 25 per cardio recs; continue to monitor; nephro following   - insulin adjusted due to elevated blood sugars  - aranesp per nephro recs   - developed chest pain on 5/5 afternoon; EKG done and reviewed with no ischemic changes noted; trops obtained negative; ECHO ordered to be done; currently on heparin drip; cardio f/u  - was still having some shoulder pain 5/7 but appears to have resolved now; shoulder xray pending to further evaluate     Rest as above. Discussed with HS

## 2023-05-07 NOTE — PROGRESS NOTE ADULT - SUBJECTIVE AND OBJECTIVE BOX
Patient is a 83y old  Female who presents with a chief complaint of Shortness of breath (06 May 2023 13:49)      SUBJECTIVE / OVERNIGHT EVENTS:    MEDICATIONS  (STANDING):  albuterol/ipratropium for Nebulization 3 milliLiter(s) Nebulizer every 6 hours  aspirin  chewable 81 milliGRAM(s) Oral daily  atorvastatin 40 milliGRAM(s) Oral at bedtime  buMETAnide Injectable 1 milliGRAM(s) IV Push two times a day  chlorhexidine 2% Cloths 1 Application(s) Topical <User Schedule>  darbepoetin Injectable ViaL 40 MICROGram(s) SubCutaneous every 7 days  dextrose 5%. 1000 milliLiter(s) (50 mL/Hr) IV Continuous <Continuous>  dextrose 5%. 1000 milliLiter(s) (100 mL/Hr) IV Continuous <Continuous>  dextrose 50% Injectable 25 Gram(s) IV Push once  dextrose 50% Injectable 25 Gram(s) IV Push once  dextrose 50% Injectable 12.5 Gram(s) IV Push once  ferrous    sulfate 325 milliGRAM(s) Oral daily  gabapentin 200 milliGRAM(s) Oral daily  glucagon  Injectable 1 milliGRAM(s) IntraMuscular once  heparin  Infusion. 1200 Unit(s)/Hr (12 mL/Hr) IV Continuous <Continuous>  insulin glargine Injectable (LANTUS) 10 Unit(s) SubCutaneous at bedtime  insulin lispro (ADMELOG) corrective regimen sliding scale   SubCutaneous three times a day before meals  insulin lispro (ADMELOG) corrective regimen sliding scale   SubCutaneous at bedtime  insulin lispro Injectable (ADMELOG) 5 Unit(s) SubCutaneous three times a day before meals  metoprolol tartrate 100 milliGRAM(s) Oral every 12 hours  Nephro-ashley 1 Tablet(s) Oral daily  pantoprazole    Tablet 40 milliGRAM(s) Oral before breakfast  polyethylene glycol 3350 17 Gram(s) Oral daily  senna 2 Tablet(s) Oral at bedtime  sodium bicarbonate 650 milliGRAM(s) Oral daily  spironolactone 25 milliGRAM(s) Oral daily    MEDICATIONS  (PRN):  acetaminophen     Tablet .. 650 milliGRAM(s) Oral every 6 hours PRN Temp greater or equal to 38C (100.4F), Mild Pain (1 - 3)  dextrose Oral Gel 15 Gram(s) Oral once PRN Blood Glucose LESS THAN 70 milliGRAM(s)/deciliter  melatonin 3 milliGRAM(s) Oral at bedtime PRN Insomnia      CAPILLARY BLOOD GLUCOSE      POCT Blood Glucose.: 197 mg/dL (06 May 2023 21:18)  POCT Blood Glucose.: 245 mg/dL (06 May 2023 17:18)  POCT Blood Glucose.: 235 mg/dL (06 May 2023 11:38)  POCT Blood Glucose.: 340 mg/dL (06 May 2023 07:59)    I&O's Summary    06 May 2023 07:01  -  07 May 2023 07:00  --------------------------------------------------------  IN: 480 mL / OUT: 1000 mL / NET: -520 mL        Vital Signs Last 24 Hrs  T(C): 36.4 (07 May 2023 04:52), Max: 36.9 (06 May 2023 11:17)  T(F): 97.6 (07 May 2023 04:52), Max: 98.5 (06 May 2023 11:17)  HR: 76 (07 May 2023 04:52) (67 - 76)  BP: 124/76 (07 May 2023 04:52) (118/72 - 124/76)  BP(mean): --  RR: 18 (07 May 2023 04:52) (18 - 18)  SpO2: 90% (07 May 2023 04:52) (85% - 99%)    Parameters below as of 07 May 2023 04:52  Patient On (Oxygen Delivery Method): nasal cannula  O2 Flow (L/min): 1      PHYSICAL EXAM:  GENERAL: Sitting in bed in no acute distress  EYES: Conjunctiva noninjected or pale, sclera anicteric  HENT: NC/AT, moist mucous membranes  NECK: Supple, trachea midline  LUNG: CTAB. On 1L nasal cannula, though removed during conversation with no increased work of breathing  CV: RRR, Pulses- Radial: 2+ b/l.   ABDOMEN: Nondistended, nontender  MSK: No visible deformities, nontender extremities. R arm with chronic lymphedema, no pitting edema noted in any other extremity.   SKIN: No rashes, bruises  NEURO: AAOx4 (to person, place, time, event), no tremor    LABS:                        9.2    10.04 )-----------( 265      ( 07 May 2023 06:49 )             31.7      05-07    139  |  96  |  33<H>  ----------------------------<  251<H>  4.1   |  31  |  1.91<H>    Ca    9.5      07 May 2023 06:49  Phos  3.6     05-07  Mg     1.8     05-07    TPro  7.0  /  Alb  3.6  /  TBili  0.4  /  DBili  x   /  AST  17  /  ALT  15  /  AlkPhos  90  05-07    PTT - ( 07 May 2023 06:49 )  PTT:64.0 sec  CARDIAC MARKERS ( 05 May 2023 15:13 )  x     / x     / 17 U/L / x     / 1.2 ng/mL          RADIOLOGY & ADDITIONAL TESTS:    Imaging Personally Reviewed:    Consultant(s) Notes Reviewed:      Care Discussed with Consultants/Other Providers:   Patient is a 83y old  Female who presents with a chief complaint of Shortness of breath (06 May 2023 13:49)      SUBJECTIVE / OVERNIGHT EVENTS: No acute events overnight. Patient seen and examined at bedside, feels about the same, no chest pain. Overnight noted to have mild desaturation on nasal cannula, but patient notes she remained asymptomatic.     MEDICATIONS  (STANDING):  albuterol/ipratropium for Nebulization 3 milliLiter(s) Nebulizer every 6 hours  aspirin  chewable 81 milliGRAM(s) Oral daily  atorvastatin 40 milliGRAM(s) Oral at bedtime  buMETAnide Injectable 1 milliGRAM(s) IV Push two times a day  chlorhexidine 2% Cloths 1 Application(s) Topical <User Schedule>  darbepoetin Injectable ViaL 40 MICROGram(s) SubCutaneous every 7 days  dextrose 5%. 1000 milliLiter(s) (50 mL/Hr) IV Continuous <Continuous>  dextrose 5%. 1000 milliLiter(s) (100 mL/Hr) IV Continuous <Continuous>  dextrose 50% Injectable 25 Gram(s) IV Push once  dextrose 50% Injectable 25 Gram(s) IV Push once  dextrose 50% Injectable 12.5 Gram(s) IV Push once  ferrous    sulfate 325 milliGRAM(s) Oral daily  gabapentin 200 milliGRAM(s) Oral daily  glucagon  Injectable 1 milliGRAM(s) IntraMuscular once  heparin  Infusion. 1200 Unit(s)/Hr (12 mL/Hr) IV Continuous <Continuous>  insulin glargine Injectable (LANTUS) 10 Unit(s) SubCutaneous at bedtime  insulin lispro (ADMELOG) corrective regimen sliding scale   SubCutaneous three times a day before meals  insulin lispro (ADMELOG) corrective regimen sliding scale   SubCutaneous at bedtime  insulin lispro Injectable (ADMELOG) 5 Unit(s) SubCutaneous three times a day before meals  metoprolol tartrate 100 milliGRAM(s) Oral every 12 hours  Nephro-ashley 1 Tablet(s) Oral daily  pantoprazole    Tablet 40 milliGRAM(s) Oral before breakfast  polyethylene glycol 3350 17 Gram(s) Oral daily  senna 2 Tablet(s) Oral at bedtime  sodium bicarbonate 650 milliGRAM(s) Oral daily  spironolactone 25 milliGRAM(s) Oral daily    MEDICATIONS  (PRN):  acetaminophen     Tablet .. 650 milliGRAM(s) Oral every 6 hours PRN Temp greater or equal to 38C (100.4F), Mild Pain (1 - 3)  dextrose Oral Gel 15 Gram(s) Oral once PRN Blood Glucose LESS THAN 70 milliGRAM(s)/deciliter  melatonin 3 milliGRAM(s) Oral at bedtime PRN Insomnia      CAPILLARY BLOOD GLUCOSE      POCT Blood Glucose.: 197 mg/dL (06 May 2023 21:18)  POCT Blood Glucose.: 245 mg/dL (06 May 2023 17:18)  POCT Blood Glucose.: 235 mg/dL (06 May 2023 11:38)  POCT Blood Glucose.: 340 mg/dL (06 May 2023 07:59)    I&O's Summary    06 May 2023 07:01  -  07 May 2023 07:00  --------------------------------------------------------  IN: 480 mL / OUT: 1000 mL / NET: -520 mL        Vital Signs Last 24 Hrs  T(C): 36.4 (07 May 2023 04:52), Max: 36.9 (06 May 2023 11:17)  T(F): 97.6 (07 May 2023 04:52), Max: 98.5 (06 May 2023 11:17)  HR: 76 (07 May 2023 04:52) (67 - 76)  BP: 124/76 (07 May 2023 04:52) (118/72 - 124/76)  BP(mean): --  RR: 18 (07 May 2023 04:52) (18 - 18)  SpO2: 90% (07 May 2023 04:52) (85% - 99%)    Parameters below as of 07 May 2023 04:52  Patient On (Oxygen Delivery Method): nasal cannula  O2 Flow (L/min): 1      PHYSICAL EXAM:  GENERAL: Sitting in bed in no acute distress  EYES: Conjunctiva noninjected or pale, sclera anicteric  HENT: NC/AT, moist mucous membranes  NECK: Supple, trachea midline.  LUNG: CTAB. On 1L nasal cannula  CV: RRR, Pulses- Radial: 2+ b/l.   ABDOMEN: Nondistended, nontender  MSK: No visible deformities, nontender extremities. R arm with chronic lymphedema, no pitting edema noted in any other extremity.   SKIN: No rashes, bruises  NEURO: AAOx4 (to person, place, time, event), no tremor    LABS:                        9.2    10.04 )-----------( 265      ( 07 May 2023 06:49 )             31.7      05-07    139  |  96  |  33<H>  ----------------------------<  251<H>  4.1   |  31  |  1.91<H>    Ca    9.5      07 May 2023 06:49  Phos  3.6     05-07  Mg     1.8     05-07    TPro  7.0  /  Alb  3.6  /  TBili  0.4  /  DBili  x   /  AST  17  /  ALT  15  /  AlkPhos  90  05-07    PTT - ( 07 May 2023 06:49 )  PTT:64.0 sec  CARDIAC MARKERS ( 05 May 2023 15:13 )  x     / x     / 17 U/L / x     / 1.2 ng/mL          RADIOLOGY & ADDITIONAL TESTS:    Imaging Personally Reviewed:    Consultant(s) Notes Reviewed:      Care Discussed with Consultants/Other Providers:

## 2023-05-07 NOTE — PROGRESS NOTE ADULT - SUBJECTIVE AND OBJECTIVE BOX
NEPHROLOGY     Patient seen and examined.    MEDICATIONS  (STANDING):  albuterol/ipratropium for Nebulization 3 milliLiter(s) Nebulizer every 6 hours  aspirin  chewable 81 milliGRAM(s) Oral daily  atorvastatin 40 milliGRAM(s) Oral at bedtime  buMETAnide Injectable 1 milliGRAM(s) IV Push two times a day  chlorhexidine 2% Cloths 1 Application(s) Topical <User Schedule>  darbepoetin Injectable ViaL 40 MICROGram(s) SubCutaneous every 7 days  dextrose 5%. 1000 milliLiter(s) (100 mL/Hr) IV Continuous <Continuous>  dextrose 5%. 1000 milliLiter(s) (50 mL/Hr) IV Continuous <Continuous>  dextrose 50% Injectable 25 Gram(s) IV Push once  dextrose 50% Injectable 12.5 Gram(s) IV Push once  dextrose 50% Injectable 25 Gram(s) IV Push once  ferrous    sulfate 325 milliGRAM(s) Oral daily  gabapentin 200 milliGRAM(s) Oral daily  glucagon  Injectable 1 milliGRAM(s) IntraMuscular once  heparin  Infusion. 1200 Unit(s)/Hr (12 mL/Hr) IV Continuous <Continuous>  insulin glargine Injectable (LANTUS) 10 Unit(s) SubCutaneous at bedtime  insulin lispro (ADMELOG) corrective regimen sliding scale   SubCutaneous at bedtime  insulin lispro (ADMELOG) corrective regimen sliding scale   SubCutaneous three times a day before meals  insulin lispro Injectable (ADMELOG) 5 Unit(s) SubCutaneous three times a day before meals  metoprolol tartrate 100 milliGRAM(s) Oral every 12 hours  Nephro-ashley 1 Tablet(s) Oral daily  pantoprazole    Tablet 40 milliGRAM(s) Oral before breakfast  polyethylene glycol 3350 17 Gram(s) Oral daily  senna 2 Tablet(s) Oral at bedtime  sodium bicarbonate 650 milliGRAM(s) Oral daily  spironolactone 25 milliGRAM(s) Oral daily    VITALS:  T(C): , Max: 36.9 (05-06-23 @ 11:17)  T(F): , Max: 98.5 (05-06-23 @ 11:17)  HR: 76 (05-07-23 @ 04:52)  BP: 124/76 (05-07-23 @ 04:52)  BP(mean): --  RR: 18 (05-07-23 @ 04:52)  SpO2: 90% (05-07-23 @ 04:52)  Wt(kg): --  I and O's:    05-06 @ 07:01  -  05-07 @ 07:00  --------------------------------------------------------  IN: 480 mL / OUT: 1000 mL / NET: -520 mL          REVIEW OF SYSTEMS:  Full ROS done and were negative unless otherwise indicated in HPI/assessment.     PHYSICAL EXAM:  Constitutional: NAD  Respiratory: CTA B/L  Cardiovascular: S1 and S2, RRR  Gastrointestinal: + BS, soft, NT, ND  Extremities: No peripheral edema  Neurological: AAO x 3, CN 2-12 intact  : No [place]  Access: Not applicable    LABS:                        9.2    10.04 )-----------( 265      ( 07 May 2023 06:49 )             31.7     05-07    139  |  96  |  33<H>  ----------------------------<  251<H>  4.1   |  31  |  1.91<H>    Ca    9.5      07 May 2023 06:49  Phos  3.6     05-07  Mg     1.8     05-07    TPro  7.0  /  Alb  3.6  /  TBili  0.4  /  DBili  x   /  AST  17  /  ALT  15  /  AlkPhos  90  05-07     NEPHROLOGY     Patient seen and examined resting comfortably, no complaints, on NC, in no acute distress.     MEDICATIONS  (STANDING):  albuterol/ipratropium for Nebulization 3 milliLiter(s) Nebulizer every 6 hours  aspirin  chewable 81 milliGRAM(s) Oral daily  atorvastatin 40 milliGRAM(s) Oral at bedtime  buMETAnide Injectable 1 milliGRAM(s) IV Push two times a day  chlorhexidine 2% Cloths 1 Application(s) Topical <User Schedule>  darbepoetin Injectable ViaL 40 MICROGram(s) SubCutaneous every 7 days  dextrose 5%. 1000 milliLiter(s) (100 mL/Hr) IV Continuous <Continuous>  dextrose 5%. 1000 milliLiter(s) (50 mL/Hr) IV Continuous <Continuous>  dextrose 50% Injectable 25 Gram(s) IV Push once  dextrose 50% Injectable 12.5 Gram(s) IV Push once  dextrose 50% Injectable 25 Gram(s) IV Push once  ferrous    sulfate 325 milliGRAM(s) Oral daily  gabapentin 200 milliGRAM(s) Oral daily  glucagon  Injectable 1 milliGRAM(s) IntraMuscular once  heparin  Infusion. 1200 Unit(s)/Hr (12 mL/Hr) IV Continuous <Continuous>  insulin glargine Injectable (LANTUS) 10 Unit(s) SubCutaneous at bedtime  insulin lispro (ADMELOG) corrective regimen sliding scale   SubCutaneous at bedtime  insulin lispro (ADMELOG) corrective regimen sliding scale   SubCutaneous three times a day before meals  insulin lispro Injectable (ADMELOG) 5 Unit(s) SubCutaneous three times a day before meals  metoprolol tartrate 100 milliGRAM(s) Oral every 12 hours  Nephro-ashley 1 Tablet(s) Oral daily  pantoprazole    Tablet 40 milliGRAM(s) Oral before breakfast  polyethylene glycol 3350 17 Gram(s) Oral daily  senna 2 Tablet(s) Oral at bedtime  sodium bicarbonate 650 milliGRAM(s) Oral daily  spironolactone 25 milliGRAM(s) Oral daily    VITALS:  T(C): , Max: 36.9 (05-06-23 @ 11:17)  T(F): , Max: 98.5 (05-06-23 @ 11:17)  HR: 76 (05-07-23 @ 04:52)  BP: 124/76 (05-07-23 @ 04:52)  RR: 18 (05-07-23 @ 04:52)  SpO2: 90% (05-07-23 @ 04:52)    I and O's:    05-06 @ 07:01  -  05-07 @ 07:00  --------------------------------------------------------  IN: 480 mL / OUT: 1000 mL / NET: -520 mL    PHYSICAL EXAM:  General: Alerted, oriented  HEENT: MMM  Lungs:CTA-b/l  Heart: RRR S1S2  Abdomen: Soft, NTND  Ext: + le edema  : no chen    LABS:                        9.2    10.04 )-----------( 265      ( 07 May 2023 06:49 )             31.7     05-07    139  |  96  |  33<H>  ----------------------------<  251<H>  4.1   |  31  |  1.91<H>    Ca    9.5      07 May 2023 06:49  Phos  3.6     05-07  Mg     1.8     05-07    TPro  7.0  /  Alb  3.6  /  TBili  0.4  /  DBili  x   /  AST  17  /  ALT  15  /  AlkPhos  90  05-07

## 2023-05-07 NOTE — PROGRESS NOTE ADULT - ASSESSMENT
ASSESSMENT:  Ms. Batista is a 84 YO woman with PMH of HTN, HLD, DM2, HFpEF (EF 65%), CAD s/p CABG, Breast CA s/p R partial mastectomy, rectal CA s/p resection with previous admission for acute rissa s/p ERCP w/ stent c/b gallbladder perf w/ perc rissa and recurrent SVT, and recent admission for CHF exacerbation presenting for shortness of breath.----out patient nephrologist Dr Kan .    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,  nephrology aware , previous diuretic test showed minimal  function of that kidney)  CVS- BP controlled at present  hypervolemia - improving  Electrolytes - controlled  Anemia- -ckd related  also consistent  iron deficiency , tsat 7, ferritin 28---sp iv venofer  5 doses completed on 4/3023   GI--planning for ERCP  met alkalosis --- in the view of diuretic --- cont to monitor     RECOMMENDATIONS  1)Renal: renal stable   bumex as ordered    spironolactone 25mg po daily ( anti ryan + diuretic )    monitor for alkalosis   keep k ~4 and mag 2; give mag ox 400 mg po x 1   avoid nephrotoxic medication  dose all medications for GFR ~25 ml/min  daily bmp, mag and Po4   maintain renal diet   monitor I/Os  cont nebs   2)CVS: continue BP meds as ordered for now  3)cont daily oral iron   4)weekly aranesp 40 mcg SQ today     Frances Mike, LYNN  [facility]  [phone]

## 2023-05-07 NOTE — PROGRESS NOTE ADULT - PROBLEM SELECTOR PLAN 1
ProBNP 2505, TTE 3/23 with EF 76%, severe pulmonary hypertension, moderate diastolic dysfunction. POCUS done in ED cw pulmonary edema  - CT chest 5/1 with unchanged pulmonary edema/effusions  - c/w bumex 1mg IV BID   - c/w aldactone 12.5mg daily   - Strict I&Os  - Daily weights ProBNP 2505, TTE 3/23 with EF 76%, severe pulmonary hypertension, moderate diastolic dysfunction. POCUS done in ED cw pulmonary edema  - CT chest 5/1 with unchanged pulmonary edema/effusions  - Per cardiology transition bumex 1mg IV BID to bumex 1 mg PO BID on 5/8  - c/w aldactone 12.5mg daily   - Strict I&Os  - Daily weights

## 2023-05-08 LAB
ANION GAP SERPL CALC-SCNC: 12 MMOL/L — SIGNIFICANT CHANGE UP (ref 5–17)
APPEARANCE UR: ABNORMAL
APTT BLD: 65.6 SEC — HIGH (ref 27.5–35.5)
BACTERIA # UR AUTO: ABNORMAL
BILIRUB UR-MCNC: NEGATIVE — SIGNIFICANT CHANGE UP
BUN SERPL-MCNC: 32 MG/DL — HIGH (ref 7–23)
CALCIUM SERPL-MCNC: 9.7 MG/DL — SIGNIFICANT CHANGE UP (ref 8.4–10.5)
CHLORIDE SERPL-SCNC: 96 MMOL/L — SIGNIFICANT CHANGE UP (ref 96–108)
CO2 SERPL-SCNC: 33 MMOL/L — HIGH (ref 22–31)
COLOR SPEC: YELLOW — SIGNIFICANT CHANGE UP
CREAT SERPL-MCNC: 1.96 MG/DL — HIGH (ref 0.5–1.3)
DIFF PNL FLD: ABNORMAL
EGFR: 25 ML/MIN/1.73M2 — LOW
EPI CELLS # UR: 0 /HPF — SIGNIFICANT CHANGE UP
GLUCOSE BLDC GLUCOMTR-MCNC: 239 MG/DL — HIGH (ref 70–99)
GLUCOSE BLDC GLUCOMTR-MCNC: 240 MG/DL — HIGH (ref 70–99)
GLUCOSE BLDC GLUCOMTR-MCNC: 276 MG/DL — HIGH (ref 70–99)
GLUCOSE BLDC GLUCOMTR-MCNC: 293 MG/DL — HIGH (ref 70–99)
GLUCOSE SERPL-MCNC: 214 MG/DL — HIGH (ref 70–99)
GLUCOSE UR QL: NEGATIVE — SIGNIFICANT CHANGE UP
HCT VFR BLD CALC: 33.3 % — LOW (ref 34.5–45)
HGB BLD-MCNC: 9.6 G/DL — LOW (ref 11.5–15.5)
HYALINE CASTS # UR AUTO: 3 /LPF — HIGH (ref 0–2)
KETONES UR-MCNC: NEGATIVE — SIGNIFICANT CHANGE UP
LEUKOCYTE ESTERASE UR-ACNC: ABNORMAL
MAGNESIUM SERPL-MCNC: 1.8 MG/DL — SIGNIFICANT CHANGE UP (ref 1.6–2.6)
MCHC RBC-ENTMCNC: 27.7 PG — SIGNIFICANT CHANGE UP (ref 27–34)
MCHC RBC-ENTMCNC: 28.8 GM/DL — LOW (ref 32–36)
MCV RBC AUTO: 96 FL — SIGNIFICANT CHANGE UP (ref 80–100)
NITRITE UR-MCNC: NEGATIVE — SIGNIFICANT CHANGE UP
NRBC # BLD: 0 /100 WBCS — SIGNIFICANT CHANGE UP (ref 0–0)
PH UR: 6.5 — SIGNIFICANT CHANGE UP (ref 5–8)
PHOSPHATE SERPL-MCNC: 3.4 MG/DL — SIGNIFICANT CHANGE UP (ref 2.5–4.5)
PLATELET # BLD AUTO: 240 K/UL — SIGNIFICANT CHANGE UP (ref 150–400)
POTASSIUM SERPL-MCNC: 4 MMOL/L — SIGNIFICANT CHANGE UP (ref 3.5–5.3)
POTASSIUM SERPL-SCNC: 4 MMOL/L — SIGNIFICANT CHANGE UP (ref 3.5–5.3)
PROT UR-MCNC: ABNORMAL
RBC # BLD: 3.47 M/UL — LOW (ref 3.8–5.2)
RBC # FLD: 19.8 % — HIGH (ref 10.3–14.5)
RBC CASTS # UR COMP ASSIST: 6 /HPF — HIGH (ref 0–4)
SODIUM SERPL-SCNC: 141 MMOL/L — SIGNIFICANT CHANGE UP (ref 135–145)
SP GR SPEC: 1.01 — SIGNIFICANT CHANGE UP (ref 1.01–1.02)
UROBILINOGEN FLD QL: NEGATIVE — SIGNIFICANT CHANGE UP
WBC # BLD: 8.1 K/UL — SIGNIFICANT CHANGE UP (ref 3.8–10.5)
WBC # FLD AUTO: 8.1 K/UL — SIGNIFICANT CHANGE UP (ref 3.8–10.5)
WBC UR QL: 110 /HPF — HIGH (ref 0–5)

## 2023-05-08 PROCEDURE — 99223 1ST HOSP IP/OBS HIGH 75: CPT | Mod: GC

## 2023-05-08 PROCEDURE — 99233 SBSQ HOSP IP/OBS HIGH 50: CPT | Mod: GC

## 2023-05-08 RX ORDER — MAGNESIUM OXIDE 400 MG ORAL TABLET 241.3 MG
400 TABLET ORAL ONCE
Refills: 0 | Status: COMPLETED | OUTPATIENT
Start: 2023-05-08 | End: 2023-05-08

## 2023-05-08 RX ORDER — PHENAZOPYRIDINE HCL 100 MG
100 TABLET ORAL EVERY 8 HOURS
Refills: 0 | Status: COMPLETED | OUTPATIENT
Start: 2023-05-08 | End: 2023-05-11

## 2023-05-08 RX ORDER — CEFTRIAXONE 500 MG/1
1000 INJECTION, POWDER, FOR SOLUTION INTRAMUSCULAR; INTRAVENOUS EVERY 24 HOURS
Refills: 0 | Status: COMPLETED | OUTPATIENT
Start: 2023-05-08 | End: 2023-05-10

## 2023-05-08 RX ORDER — SPIRONOLACTONE 25 MG/1
12.5 TABLET, FILM COATED ORAL DAILY
Refills: 0 | Status: DISCONTINUED | OUTPATIENT
Start: 2023-05-09 | End: 2023-05-17

## 2023-05-08 RX ADMIN — SPIRONOLACTONE 25 MILLIGRAM(S): 25 TABLET, FILM COATED ORAL at 05:27

## 2023-05-08 RX ADMIN — PANTOPRAZOLE SODIUM 40 MILLIGRAM(S): 20 TABLET, DELAYED RELEASE ORAL at 05:27

## 2023-05-08 RX ADMIN — Medication 3 MILLILITER(S): at 17:15

## 2023-05-08 RX ADMIN — ATORVASTATIN CALCIUM 40 MILLIGRAM(S): 80 TABLET, FILM COATED ORAL at 21:43

## 2023-05-08 RX ADMIN — Medication 81 MILLIGRAM(S): at 11:50

## 2023-05-08 RX ADMIN — Medication 3: at 11:49

## 2023-05-08 RX ADMIN — Medication 100 MILLIGRAM(S): at 05:28

## 2023-05-08 RX ADMIN — INSULIN GLARGINE 10 UNIT(S): 100 INJECTION, SOLUTION SUBCUTANEOUS at 21:45

## 2023-05-08 RX ADMIN — Medication 3 MILLILITER(S): at 11:49

## 2023-05-08 RX ADMIN — BUMETANIDE 1 MILLIGRAM(S): 0.25 INJECTION INTRAMUSCULAR; INTRAVENOUS at 14:26

## 2023-05-08 RX ADMIN — HEPARIN SODIUM 1200 UNIT(S)/HR: 5000 INJECTION INTRAVENOUS; SUBCUTANEOUS at 19:29

## 2023-05-08 RX ADMIN — HEPARIN SODIUM 1200 UNIT(S)/HR: 5000 INJECTION INTRAVENOUS; SUBCUTANEOUS at 07:21

## 2023-05-08 RX ADMIN — Medication 1 TABLET(S): at 11:50

## 2023-05-08 RX ADMIN — Medication 650 MILLIGRAM(S): at 11:51

## 2023-05-08 RX ADMIN — Medication 1: at 21:44

## 2023-05-08 RX ADMIN — Medication 650 MILLIGRAM(S): at 22:46

## 2023-05-08 RX ADMIN — Medication 100 MILLIGRAM(S): at 21:48

## 2023-05-08 RX ADMIN — MAGNESIUM OXIDE 400 MG ORAL TABLET 400 MILLIGRAM(S): 241.3 TABLET ORAL at 07:47

## 2023-05-08 RX ADMIN — Medication 5 UNIT(S): at 11:49

## 2023-05-08 RX ADMIN — CHLORHEXIDINE GLUCONATE 1 APPLICATION(S): 213 SOLUTION TOPICAL at 05:23

## 2023-05-08 RX ADMIN — BUMETANIDE 1 MILLIGRAM(S): 0.25 INJECTION INTRAMUSCULAR; INTRAVENOUS at 05:28

## 2023-05-08 RX ADMIN — HEPARIN SODIUM 1200 UNIT(S)/HR: 5000 INJECTION INTRAVENOUS; SUBCUTANEOUS at 17:16

## 2023-05-08 RX ADMIN — Medication 2: at 07:49

## 2023-05-08 RX ADMIN — Medication 650 MILLIGRAM(S): at 21:43

## 2023-05-08 RX ADMIN — Medication 325 MILLIGRAM(S): at 11:50

## 2023-05-08 RX ADMIN — HEPARIN SODIUM 1200 UNIT(S)/HR: 5000 INJECTION INTRAVENOUS; SUBCUTANEOUS at 06:49

## 2023-05-08 RX ADMIN — Medication 5 UNIT(S): at 07:49

## 2023-05-08 RX ADMIN — GABAPENTIN 200 MILLIGRAM(S): 400 CAPSULE ORAL at 11:50

## 2023-05-08 RX ADMIN — Medication 5 UNIT(S): at 17:16

## 2023-05-08 RX ADMIN — CEFTRIAXONE 100 MILLIGRAM(S): 500 INJECTION, POWDER, FOR SOLUTION INTRAMUSCULAR; INTRAVENOUS at 16:54

## 2023-05-08 RX ADMIN — Medication 100 MILLIGRAM(S): at 17:16

## 2023-05-08 RX ADMIN — Medication 3 MILLILITER(S): at 05:23

## 2023-05-08 RX ADMIN — Medication 2: at 17:15

## 2023-05-08 NOTE — PROGRESS NOTE ADULT - PROBLEM SELECTOR PLAN 5
Currently at baseline 1.5-1.7 Currently at baseline 1.5-1.7  slight increase to high 1.9 range  -nephrology following and stating still within normal kidney function

## 2023-05-08 NOTE — CONSULT NOTE ADULT - ASSESSMENT
83 year old female with history of HTN, HLD, DM2, chronic diastolic HF, LE DVT, CAD s/p CABG, breast cancer s/p R partial mastectomy, rectal cancer s/p resection, and prior admission for acute cholecystitis s/p perc rissa tube 1/25/2023, and choledocholithiasis s/p ERCP and 10F x 5cm plastic biliary stent placement 1/20/2023 presents with shortness of breath. Admitted for acute on chronic diastolic HF. Volume balance improved with diuresis. Remains on supplemental O2. Recent TTE is improved from prior but does show moderate pulmonary hypertension.    Bedside POCUS shows small bilateral pleural effusions but normal aeration pattern anteriorly.     Recommend RHC for further evaluation.  Discussed with primary team and cardiology. 83 year old female with history of HTN, HLD, DM2, chronic diastolic HF, LE DVT, CAD s/p CABG, breast cancer s/p R partial mastectomy, rectal cancer s/p resection, and prior admission for acute cholecystitis s/p perc rissa tube 1/25/2023, and choledocholithiasis s/p ERCP and 10F x 5cm plastic biliary stent placement 1/20/2023 presents with shortness of breath. Admitted for acute on chronic diastolic HF. Volume balance improved with diuresis. Remains on supplemental O2. Recent TTE is improved from prior but does show moderate pulmonary hypertension.    Bedside POCUS shows small bilateral pleural effusions but normal aeration pattern anteriorly.     Acute on chronic diastolic HF  Pulmonary hypertension  Hypoxemia    Recommend RHC for further evaluation.  Wean O2 to target SpO2 92 - 95%  Discussed with primary team and cardiology.

## 2023-05-08 NOTE — PROGRESS NOTE ADULT - PROBLEM SELECTOR PLAN 2
- Currently on 1L NC  - Will attempt to wean to room air  - Not on home O2  - Duonebs q6 for wheezes  - Incentive spirometry - Currently on 1L NC  - Will attempt to wean to room air  - Not on home O2  - Duonebs q6 for wheezes  - Incentive spirometry  - will have pulm evaluate patient given mod PH as a possible etiology of her hypoxia  - might require home O2 setup

## 2023-05-08 NOTE — PROGRESS NOTE ADULT - SUBJECTIVE AND OBJECTIVE BOX
DATE OF SERVICE: 05-08-23 @ 12:14    Patient is a 83y old  Female who presents with a chief complaint of Shortness of breath (08 May 2023 11:10)      INTERVAL HISTORY: No acute events overnight, Laying comfortably. Remains on 2L NC    REVIEW OF SYSTEMS:  CONSTITUTIONAL: No weakness  EYES/ENT: No visual changes;  No throat pain   NECK: No pain or stiffness  RESPIRATORY: No cough, wheezing; No shortness of breath  CARDIOVASCULAR: No chest pain or palpitations  GASTROINTESTINAL: No abdominal  pain. No nausea, vomiting, or hematemesis  GENITOURINARY: No dysuria, frequency or hematuria  NEUROLOGICAL: No stroke like symptoms  SKIN: No rashes    TELEMETRY Personally reviewed: SB/SR 55-60  	  MEDICATIONS:  buMETAnide 1 milliGRAM(s) Oral two times a day  metoprolol tartrate 100 milliGRAM(s) Oral every 12 hours  spironolactone 25 milliGRAM(s) Oral daily        PHYSICAL EXAM:  T(C): 36.3 (05-08-23 @ 11:16), Max: 36.8 (05-07-23 @ 19:51)  HR: 66 (05-08-23 @ 11:16) (66 - 77)  BP: 101/64 (05-08-23 @ 11:16) (101/64 - 125/61)  RR: 18 (05-08-23 @ 11:16) (18 - 18)  SpO2: 92% (05-08-23 @ 11:16) (84% - 98%)  Wt(kg): --  I&O's Summary    07 May 2023 07:01  -  08 May 2023 07:00  --------------------------------------------------------  IN: 1024 mL / OUT: 1800 mL / NET: -776 mL    08 May 2023 07:01  -  08 May 2023 12:14  --------------------------------------------------------  IN: 520 mL / OUT: 0 mL / NET: 520 mL          Appearance: In no distress	  HEENT:    PERRL, EOMI	  Cardiovascular:  S1 S2, No JVD  Respiratory: Lungs clear to auscultation	  Gastrointestinal:  Soft, Non-tender, + BS	  Vascularature:  No edema of LE  Psychiatric: Appropriate affect   Neuro: no acute focal deficits                               9.6    8.10  )-----------( 240      ( 08 May 2023 06:18 )             33.3     05-08    141  |  96  |  32<H>  ----------------------------<  214<H>  4.0   |  33<H>  |  1.96<H>    Ca    9.7      08 May 2023 06:20  Phos  3.4     05-08  Mg     1.8     05-08    TPro  7.0  /  Alb  3.6  /  TBili  0.4  /  DBili  x   /  AST  17  /  ALT  15  /  AlkPhos  90  05-07        Labs personally reviewed      ASSESSMENT/PLAN: 	  Ms. Batista is a 82 YO woman with PMH of HTN, HLD, DM2, HFpEF (EF 65%), CAD s/p CABG, Breast CA s/p R partial mastectomy, rectal CA s/p resection with recent admission for acute rissa s/p ERCP w/ stent c/b gallbladder perf w/ perc rissa and recurrent SVT, presenting for shortness of breath. Denies CP, palpitations, orthopnea or syncope. Follows as OP with Dr. Hagan.    Problem/Plan -1  Problem: Acute on Chronic Diastolic CHF  - POCUS suggestive of pulm edema  - CXR with no pulm edema or effusion. Prior CT one month ago with moderate effusions and pulm edema  - proBNP 2505  - Prior TTE 3/23 shows preserved EF, hyperdynamic LV function, basal inferoseptum hypokinesis, decreased RV systolic function severe pulm pressures, B/L pleural effusions  - repeat proBNP slightly higher at 2663   - Strict I&Os, daily weights  - Reports SOB, still requires supplemental O2 intermittently   - Continue Bumex 1mg BID PO   - Initiated Good 25mg daily     Problem/Plan -2  Problem: CAD   - s/p CABG 2020  - ECG with no ischemia noted  - Trop 21  - c/w ASA, metoprolol and statin  - Prior TTE 3/23 shows preserved EF, hyperdynamic LV function, basal inferoseptal hypokinesis, decreased RV systolic function severe pulm pressures   - 5/5 c/o CP/SOB: trop/CKMB/ECG wnl. TTE pending.     Problem/Plan -3  Problem: hx of Recurrent SVT  - c/w Metoprolol 100mg PO BID  - cont to monitor on tele    Problem/Plan -4  Problem: Atrial Fibrillation  - Patient with n/o A-flutter on previous admission  - c/w Metoprolol   - eliquis 2.5mg PO BID on hold   - c/w Heparin gtt    Problem/Plan- 5  Problem: Hx of Cholecystitis  - ERCP on hold pending improvement in respiratory function          Ana Maria Scruggs, NARGIS Winchester, DO [facility]  Cardiovascular Medicine  [address] [place] [address], [address]  Available through call or text on [employer] TE[employer]  Office: [phone]   DATE OF SERVICE: 05-08-23 @ 12:14    Patient is a 83y old  Female who presents with a chief complaint of Shortness of breath (08 May 2023 11:10)      INTERVAL HISTORY: No acute events overnight, Laying comfortably. Remains on 2L NC    REVIEW OF SYSTEMS:  CONSTITUTIONAL: No weakness  EYES/ENT: No visual changes;  No throat pain   NECK: No pain or stiffness  RESPIRATORY: No cough, wheezing; No shortness of breath  CARDIOVASCULAR: No chest pain or palpitations  GASTROINTESTINAL: No abdominal  pain. No nausea, vomiting, or hematemesis  GENITOURINARY: No dysuria, frequency or hematuria  NEUROLOGICAL: No stroke like symptoms  SKIN: No rashes    TELEMETRY Personally reviewed: SB/SR 55-60  	  MEDICATIONS:  buMETAnide 1 milliGRAM(s) Oral two times a day  metoprolol tartrate 100 milliGRAM(s) Oral every 12 hours  spironolactone 25 milliGRAM(s) Oral daily        PHYSICAL EXAM:  T(C): 36.3 (05-08-23 @ 11:16), Max: 36.8 (05-07-23 @ 19:51)  HR: 66 (05-08-23 @ 11:16) (66 - 77)  BP: 101/64 (05-08-23 @ 11:16) (101/64 - 125/61)  RR: 18 (05-08-23 @ 11:16) (18 - 18)  SpO2: 92% (05-08-23 @ 11:16) (84% - 98%)  Wt(kg): --  I&O's Summary    07 May 2023 07:01  -  08 May 2023 07:00  --------------------------------------------------------  IN: 1024 mL / OUT: 1800 mL / NET: -776 mL    08 May 2023 07:01  -  08 May 2023 12:14  --------------------------------------------------------  IN: 520 mL / OUT: 0 mL / NET: 520 mL          Appearance: In no distress	  HEENT:    PERRL, EOMI	  Cardiovascular:  S1 S2, No JVD  Respiratory: Lungs clear to auscultation	  Gastrointestinal:  Soft, Non-tender, + BS	  Vascularature:  No edema of LE  Psychiatric: Appropriate affect   Neuro: no acute focal deficits                               9.6    8.10  )-----------( 240      ( 08 May 2023 06:18 )             33.3     05-08    141  |  96  |  32<H>  ----------------------------<  214<H>  4.0   |  33<H>  |  1.96<H>    Ca    9.7      08 May 2023 06:20  Phos  3.4     05-08  Mg     1.8     05-08    TPro  7.0  /  Alb  3.6  /  TBili  0.4  /  DBili  x   /  AST  17  /  ALT  15  /  AlkPhos  90  05-07        Labs personally reviewed      ASSESSMENT/PLAN: 	  Ms. Batista is a 82 YO woman with PMH of HTN, HLD, DM2, HFpEF (EF 65%), CAD s/p CABG, Breast CA s/p R partial mastectomy, rectal CA s/p resection with recent admission for acute rissa s/p ERCP w/ stent c/b gallbladder perf w/ perc rissa and recurrent SVT, presenting for shortness of breath. Denies CP, palpitations, orthopnea or syncope. Follows as OP with Dr. Hagan.    Problem/Plan -1  Problem: Acute on Chronic Diastolic CHF  - POCUS suggestive of pulm edema  - CXR with no pulm edema or effusion. Prior CT one month ago with moderate effusions and pulm edema  - proBNP 2505  - Prior TTE 3/23 shows preserved EF, hyperdynamic LV function, basal inferoseptum hypokinesis, decreased RV systolic function severe pulm pressures, B/L pleural effusions  - Repeat TTE 5/6 Normal left ventricular cavity size. The left ventricular wall thickness is normal. The left ventricular systolic function is normal, EF 62%. No RWA. Mod PH. PAS pressure 56 mmHg.  - repeat proBNP slightly higher at 2663   - Strict I&Os, daily weights  - Reports SOB, still requires supplemental O2 intermittently   - Continue Bumex 1mg BID PO   - Initiated [place] 25mg daily     Problem/Plan -2  Problem: CAD   - s/p CABG 2020  - ECG with no ischemia noted  - Trop 21  - c/w ASA, metoprolol and statin  - Prior TTE 3/23 shows preserved EF, hyperdynamic LV function, basal inferoseptal hypokinesis, decreased RV systolic function severe pulm pressures   - 5/5 c/o CP/SOB: trop/CKMB/ECG wnl. TTE pending.     Problem/Plan -3  Problem: hx of Recurrent SVT  - c/w Metoprolol 100mg PO BID  - cont to monitor on tele    Problem/Plan -4  Problem: Atrial Fibrillation  - Patient with n/o A-flutter on previous admission  - c/w Metoprolol   - eliquis 2.5mg PO BID on hold   - c/w Heparin gtt    Problem/Plan- 5  Problem: Hx of Cholecystitis  - ERCP on hold pending improvement in respiratory function          NARGIS Smith,  [facility]  Cardiovascular Medicine  [address] [place] [address], [address]  Available through call or text on [employer] TEAM[employer]  Office: [phone]   DATE OF SERVICE: 05-08-23 @ 12:14    Patient is a 83y old  Female who presents with a chief complaint of Shortness of breath (08 May 2023 11:10)      INTERVAL HISTORY: No acute events overnight, Laying comfortably. Remains on 2L NC    REVIEW OF SYSTEMS:  CONSTITUTIONAL: No weakness  EYES/ENT: No visual changes;  No throat pain   NECK: No pain or stiffness  RESPIRATORY: No cough, wheezing; No shortness of breath  CARDIOVASCULAR: No chest pain or palpitations  GASTROINTESTINAL: No abdominal  pain. No nausea, vomiting, or hematemesis  GENITOURINARY: No dysuria, frequency or hematuria  NEUROLOGICAL: No stroke like symptoms  SKIN: No rashes    TELEMETRY Personally reviewed: SB/SR 55-60  	  MEDICATIONS:  buMETAnide 1 milliGRAM(s) Oral two times a day  metoprolol tartrate 100 milliGRAM(s) Oral every 12 hours  spironolactone 25 milliGRAM(s) Oral daily        PHYSICAL EXAM:  T(C): 36.3 (05-08-23 @ 11:16), Max: 36.8 (05-07-23 @ 19:51)  HR: 66 (05-08-23 @ 11:16) (66 - 77)  BP: 101/64 (05-08-23 @ 11:16) (101/64 - 125/61)  RR: 18 (05-08-23 @ 11:16) (18 - 18)  SpO2: 92% (05-08-23 @ 11:16) (84% - 98%)  Wt(kg): --  I&O's Summary    07 May 2023 07:01  -  08 May 2023 07:00  --------------------------------------------------------  IN: 1024 mL / OUT: 1800 mL / NET: -776 mL    08 May 2023 07:01  -  08 May 2023 12:14  --------------------------------------------------------  IN: 520 mL / OUT: 0 mL / NET: 520 mL          Appearance: In no distress	  HEENT:    PERRL, EOMI	  Cardiovascular:  S1 S2, No JVD  Respiratory: Lungs clear to auscultation	  Gastrointestinal:  Soft, Non-tender, + BS	  Vascularature:  No edema of LE  Psychiatric: Appropriate affect   Neuro: no acute focal deficits                               9.6    8.10  )-----------( 240      ( 08 May 2023 06:18 )             33.3     05-08    141  |  96  |  32<H>  ----------------------------<  214<H>  4.0   |  33<H>  |  1.96<H>    Ca    9.7      08 May 2023 06:20  Phos  3.4     05-08  Mg     1.8     05-08    TPro  7.0  /  Alb  3.6  /  TBili  0.4  /  DBili  x   /  AST  17  /  ALT  15  /  AlkPhos  90  05-07        Labs personally reviewed      ASSESSMENT/PLAN: 	  Ms. Batista is a 82 YO woman with PMH of HTN, HLD, DM2, HFpEF (EF 65%), CAD s/p CABG, Breast CA s/p R partial mastectomy, rectal CA s/p resection with recent admission for acute rissa s/p ERCP w/ stent c/b gallbladder perf w/ perc rissa and recurrent SVT, presenting for shortness of breath. Denies CP, palpitations, orthopnea or syncope. Follows as OP with Dr. Hagan.    Problem/Plan -1  Problem: Acute on Chronic Diastolic CHF  - POCUS suggestive of pulm edema  - CXR with no pulm edema or effusion. Prior CT one month ago with moderate effusions and pulm edema  - proBNP 2505  - Prior TTE 3/23 shows preserved EF, hyperdynamic LV function, basal inferoseptum hypokinesis, decreased RV systolic function severe pulm pressures, B/L pleural effusions  - Repeat TTE 5/6 Normal left ventricular cavity size. The left ventricular wall thickness is normal. The left ventricular systolic function is normal, EF 62%. No RWA. Mod PH. PAS pressure 56 mmHg.  - Given elevated PAS and ongoing hypoxia recommend RHC in order to assess hemodynamics.   - repeat proBNP slightly higher at 2663   - Strict I&Os, daily weights  - Reports SOB, still requires supplemental O2 intermittently   - Continue Bumex 1mg BID PO   - Initiated Good 25mg daily     Problem/Plan -2  Problem: CAD   - s/p CABG 2020  - ECG with no ischemia noted  - Trop 21  - c/w ASA, metoprolol and statin  - Prior TTE 3/23 shows preserved EF, hyperdynamic LV function, basal inferoseptal hypokinesis, decreased RV systolic function severe pulm pressures   - 5/5 c/o CP/SOB: trop/CKMB/ECG wnl. TTE pending.     Problem/Plan -3  Problem: hx of Recurrent SVT  - c/w Metoprolol 100mg PO BID  - cont to monitor on tele    Problem/Plan -4  Problem: Atrial Fibrillation  - Patient with n/o A-flutter on previous admission  - c/w Metoprolol   - eliquis 2.5mg PO BID on hold   - c/w Heparin gtt    Problem/Plan- 5  Problem: Hx of Cholecystitis  - ERCP on hold pending improvement in respiratory function          Ana Maria Scruggs, ANP   Sergey Winchester, DO [facility]  Cardiovascular Medicine  [address] [place] [address], [address]  Available through call or text on [employer] TE[employer]  Office: [phone]   DATE OF SERVICE: 05-08-23 @ 12:14    Patient is a 83y old  Female who presents with a chief complaint of Shortness of breath (08 May 2023 11:10)      INTERVAL HISTORY: No acute events overnight, Laying comfortably. Remains on 2L NC    REVIEW OF SYSTEMS:  CONSTITUTIONAL: No weakness  EYES/ENT: No visual changes;  No throat pain   NECK: No pain or stiffness  RESPIRATORY: No cough, wheezing; No shortness of breath  CARDIOVASCULAR: No chest pain or palpitations  GASTROINTESTINAL: No abdominal  pain. No nausea, vomiting, or hematemesis  GENITOURINARY: No dysuria, frequency or hematuria  NEUROLOGICAL: No stroke like symptoms  SKIN: No rashes    TELEMETRY Personally reviewed: SB/SR 55-60  	  MEDICATIONS:  buMETAnide 1 milliGRAM(s) Oral two times a day  metoprolol tartrate 100 milliGRAM(s) Oral every 12 hours  spironolactone 25 milliGRAM(s) Oral daily        PHYSICAL EXAM:  T(C): 36.3 (05-08-23 @ 11:16), Max: 36.8 (05-07-23 @ 19:51)  HR: 66 (05-08-23 @ 11:16) (66 - 77)  BP: 101/64 (05-08-23 @ 11:16) (101/64 - 125/61)  RR: 18 (05-08-23 @ 11:16) (18 - 18)  SpO2: 92% (05-08-23 @ 11:16) (84% - 98%)  Wt(kg): --  I&O's Summary    07 May 2023 07:01  -  08 May 2023 07:00  --------------------------------------------------------  IN: 1024 mL / OUT: 1800 mL / NET: -776 mL    08 May 2023 07:01  -  08 May 2023 12:14  --------------------------------------------------------  IN: 520 mL / OUT: 0 mL / NET: 520 mL          Appearance: In no distress	  HEENT:    PERRL, EOMI	  Cardiovascular:  S1 S2, No JVD  Respiratory: Lungs clear to auscultation	  Gastrointestinal:  Soft, Non-tender, + BS	  Vascularature:  No edema of LE  Psychiatric: Appropriate affect   Neuro: no acute focal deficits                               9.6    8.10  )-----------( 240      ( 08 May 2023 06:18 )             33.3     05-08    141  |  96  |  32<H>  ----------------------------<  214<H>  4.0   |  33<H>  |  1.96<H>    Ca    9.7      08 May 2023 06:20  Phos  3.4     05-08  Mg     1.8     05-08    TPro  7.0  /  Alb  3.6  /  TBili  0.4  /  DBili  x   /  AST  17  /  ALT  15  /  AlkPhos  90  05-07        Labs personally reviewed      ASSESSMENT/PLAN: 	  Ms. Batista is a 82 YO woman with PMH of HTN, HLD, DM2, HFpEF (EF 65%), CAD s/p CABG, Breast CA s/p R partial mastectomy, rectal CA s/p resection with recent admission for acute rissa s/p ERCP w/ stent c/b gallbladder perf w/ perc rissa and recurrent SVT, presenting for shortness of breath. Denies CP, palpitations, orthopnea or syncope. Follows as OP with Dr. Hagan.    Problem/Plan -1  Problem: Acute on Chronic Diastolic CHF  - POCUS suggestive of pulm edema  - CXR with no pulm edema or effusion. Prior CT one month ago with moderate effusions and pulm edema  - proBNP 2505  - Prior TTE 3/23 shows preserved EF, hyperdynamic LV function, basal inferoseptum hypokinesis, decreased RV systolic function severe pulm pressures, B/L pleural effusions  - Repeat TTE 5/6 Normal left ventricular cavity size. The left ventricular wall thickness is normal. The left ventricular systolic function is normal, EF 62%. No RWA. Mod PH. PAS pressure 56 mmHg.  - Given elevated PAS and ongoing hypoxia recommend RHC in order to assess hemodynamics.   - repeat proBNP slightly higher at 2663   - Strict I&Os, daily weights  - Reports SOB, still requires supplemental O2 intermittently   - Continue Bumex 1mg BID PO   - Initiated Good 25mg daily   - Plan for C Tuesday/Wednesday    Problem/Plan -2  Problem: CAD   - s/p CABG 2020  - ECG with no ischemia noted  - Trop 21  - c/w ASA, metoprolol and statin  - Prior TTE 3/23 shows preserved EF, hyperdynamic LV function, basal inferoseptal hypokinesis, decreased RV systolic function severe pulm pressures   - 5/5 c/o CP/SOB: trop/CKMB/ECG wnl.     Problem/Plan -3  Problem: hx of Recurrent SVT  - c/w Metoprolol 100mg PO BID  - cont to monitor on tele    Problem/Plan -4  Problem: Atrial Fibrillation  - Patient with n/o A-flutter on previous admission  - c/w Metoprolol   - eliquis 2.5mg PO BID on hold   - c/w Heparin gtt    Problem/Plan- 5  Problem: Hx of Cholecystitis  - ERCP on hold pending improvement in respiratory function          Ana Maria Scruggs, NARGIS Winchester, DO [facility]  Cardiovascular Medicine  [address] [place] [address], [address]  Available through call or text on [employer] TE[employer]  Office: [phone]

## 2023-05-08 NOTE — PROGRESS NOTE ADULT - PROBLEM SELECTOR PLAN 6
A1c 7.8  Home basaglar 21/novolog 7 TID AC  - Here will restart diet and basal at 14/bolus at 4 TID AC + ISS A1c 7.8  Home basaglar 21/novolog 7 TID AC  - on 10 Lantus and 5 lispro tid

## 2023-05-08 NOTE — PROVIDER CONTACT NOTE (CHANGE IN STATUS NOTIFICATION) - SITUATION
At rest on RA and with ambulation POX 85% - patient presents with BAHT.  Placed on 2L during ambulation - POX 92% . At rest POX 92% on RA.

## 2023-05-08 NOTE — CONSULT NOTE ADULT - SUBJECTIVE AND OBJECTIVE BOX
CHIEF COMPLAINT:  SOB    HPI:  83 year old female with history of HTN, HLD, DM2, chronic diastolic HF, LE DVT, CAD s/p CABG, breast cancer s/p R partial mastectomy, rectal cancer s/p resection, and prior admission for acute cholecystitis s/p perc rissa tube 2023, and choledocholithiasis s/p ERCP and 10F x 5cm plastic biliary stent placement 2023 presents with shortness of breath for one day as well as 8-12 lbs weight gain since being discharged from rehab. SOB worse when laying flat. Was admitted for HF exacerbation. Volume status improved with diuresis. Despite improved fluid balance patient remains on 2 L NC. TTE shows moderate pulmonary hypertension, normal LV systolic and diastolic function. Prior TTE 3/2023 showed grade 2 diastolic dysfunction, severe pulmonary hypertension, and reduced RV function.    Patient interviewed with Sami  ID [id number]. Patient denies SOB at rest. Feels better since admission. Complains of LUE pain. Swelling is better.     PAST MEDICAL & SURGICAL HISTORY:  Breast CA, right   s/p mastectomy ,IV Chemotherapy    HTN (hypertension)    Lymphedema of arm  right arm s/p mastectomy    Hyperlipidemia    Rectal cancer  s/p chemotherapy and  radiation 12 weeks 2015 -3/2015    Obese    Type II diabetes mellitus    CHF (congestive heart failure)      DVT of leg (deep venous thrombosis)  right calf DVT  2019 pt on Eliquis      Renal insufficiency      Gallstone      S/P mastectomy, right        History of appendectomy        S/P ileostomy  low anterior resection-8/10/15, reversal of ileostomy       S/P colon resection        S/P TKR (total knee replacement), right  2017      S/P hip replacement, right      Herniated lumbar intervertebral disc          FAMILY HISTORY:  Family history of diabetes mellitus in mother    Family history of diabetes mellitus in son    Family history of heart attack (Child)        SOCIAL HISTORY:  Smoking: Denies  Substance Use: Denies  EtOH Use: Denies  Marital Status: [ ] Single [ ]  [ ]  [ ]   Sexual History:   Occupation:  Recent Travel:  Country of Birth: [place]  Advance Directives:    Allergies    CT scan dye (Hives)  penicillin (Unknown)    Intolerances        HOME MEDICATIONS:    REVIEW OF SYSTEMS:  CONSTITUTIONAL: No fever.   EYES: No eye pain, visual disturbances  ENMT:  No difficulty hearing, tinnitus, vertigo; No sinus or throat pain  NECK: No pain or stiffness  RESPIRATORY: No cough, wheezing, chills or hemoptysis; No shortness of breath  CARDIOVASCULAR: No chest pain, palpitations, dizziness, or leg swelling  GASTROINTESTINAL: No abdominal or epigastric pain. No nausea, vomiting, or hematemesis; No diarrhea or constipation. No melena or hematochezia.  GENITOURINARY: No dysuria, frequency, hematuria, or incontinence  NEUROLOGICAL: No headaches, memory loss, loss of strength, numbness, or tremors  SKIN: No itching, burning, rashes, or lesions   LYMPH NODES: No enlarged glands  ENDOCRINE: No heat or cold intolerance; No hair loss  MUSCULOSKELETAL: LUE pain. o muscle, back, or extremity pain  PSYCHIATRIC: No depression, anxiety, mood swings, or difficulty sleeping  HEME/LYMPH: No easy bruising, or bleeding gums  ALLERGY AND IMMUNOLOGIC: No hives or eczema_    OBJECTIVE:  ICU Vital Signs Last 24 Hrs  T(C): 36.3 (08 May 2023 11:16), Max: 36.8 (07 May 2023 19:51)  T(F): 97.4 (08 May 2023 11:16), Max: 98.2 (07 May 2023 19:51)  HR: 66 (08 May 2023 11:16) (66 - 77)  BP: 101/64 (08 May 2023 11:16) (101/64 - 124/81)  BP(mean): --  ABP: --  ABP(mean): --  RR: 18 (08 May 2023 11:16) (18 - 18)  SpO2: 92% (08 May 2023 11:16) (84% - 98%)    O2 Parameters below as of 08 May 2023 11:16  Patient On (Oxygen Delivery Method): nasal cannula  O2 Flow (L/min): 2             @ :  -   @ 07:00  --------------------------------------------------------  IN: 1024 mL / OUT: 1800 mL / NET: -776 mL    08 @ 07:  -   @ 16:46  --------------------------------------------------------  IN: 520 mL / OUT: 600 mL / NET: -80 mL      CAPILLARY BLOOD GLUCOSE      POCT Blood Glucose.: 293 mg/dL (08 May 2023 11:28)      PHYSICAL EXAM:  General: No apparent distress  HEENT: NC/AT  Neck: Supple  Respiratory: CTAB anteriorly. Mildly decreased BS at bases. No wheezing, good air entry  Cardiovascular: RRR, no murmur, mild LE edema  Abdomen: Soft, nontender, nondistended  Extremities: Warm  Skin: Intact  Neurological: A&Ox3, no focal deficits  Psychiatry: Normal mood and affect    HOSPITAL MEDICATIONS:  aspirin  chewable 81 milliGRAM(s) Oral daily  heparin  Infusion. 1200 Unit(s)/Hr IV Continuous <Continuous>    cefTRIAXone   IVPB 1000 milliGRAM(s) IV Intermittent every 24 hours    buMETAnide 1 milliGRAM(s) Oral two times a day  metoprolol tartrate 100 milliGRAM(s) Oral every 12 hours    atorvastatin 40 milliGRAM(s) Oral at bedtime  dextrose 50% Injectable 25 Gram(s) IV Push once  dextrose 50% Injectable 12.5 Gram(s) IV Push once  dextrose 50% Injectable 25 Gram(s) IV Push once  dextrose Oral Gel 15 Gram(s) Oral once PRN  glucagon  Injectable 1 milliGRAM(s) IntraMuscular once  insulin glargine Injectable (LANTUS) 10 Unit(s) SubCutaneous at bedtime  insulin lispro (ADMELOG) corrective regimen sliding scale   SubCutaneous three times a day before meals  insulin lispro (ADMELOG) corrective regimen sliding scale   SubCutaneous at bedtime  insulin lispro Injectable (ADMELOG) 5 Unit(s) SubCutaneous three times a day before meals    albuterol/ipratropium for Nebulization 3 milliLiter(s) Nebulizer every 6 hours    acetaminophen     Tablet .. 650 milliGRAM(s) Oral every 6 hours PRN  gabapentin 200 milliGRAM(s) Oral daily  melatonin 3 milliGRAM(s) Oral at bedtime PRN    pantoprazole    Tablet 40 milliGRAM(s) Oral before breakfast  polyethylene glycol 3350 17 Gram(s) Oral daily  senna 2 Tablet(s) Oral at bedtime        dextrose 5%. 1000 milliLiter(s) IV Continuous <Continuous>  dextrose 5%. 1000 milliLiter(s) IV Continuous <Continuous>  ferrous    sulfate 325 milliGRAM(s) Oral daily  Nephro-ashley 1 Tablet(s) Oral daily  sodium bicarbonate 650 milliGRAM(s) Oral daily    darbepoetin Injectable ViaL 40 MICROGram(s) SubCutaneous every 7 days    chlorhexidine 2% Cloths 1 Application(s) Topical <User Schedule>        LABS:                        9.6    8.10  )-----------( 240      ( 08 May 2023 06:18 )             33.3     Hgb Trend: 9.6<--, 9.2<--, 9.2<--, 9.7<--, 10.0<--  05-08    141  |  96  |  32<H>  ----------------------------<  214<H>  4.0   |  33<H>  |  1.96<H>    Ca    9.7      08 May 2023 06:20  Phos  3.4     05-08  Mg     1.8     05-08    TPro  7.0  /  Alb  3.6  /  TBili  0.4  /  DBili  x   /  AST  17  /  ALT  15  /  AlkPhos  90  05-07    Creatinine Trend: 1.96<--, 1.91<--, 1.85<--, 1.84<--, 1.85<--, 1.68<--  PTT - ( 08 May 2023 06:18 )  PTT:65.6 sec  Urinalysis Basic - ( 08 May 2023 15:14 )    Color: Yellow / Appearance: Slightly Turbid / S.012 / pH: x  Gluc: x / Ketone: Negative  / Bili: Negative / Urobili: Negative   Blood: x / Protein: Trace / Nitrite: Negative   Leuk Esterase: Large / RBC: 6 /hpf /  /HPF   Sq Epi: x / Non Sq Epi: x / Bacteria: Many            MICROBIOLOGY:     RADIOLOGY:  [x] Reviewed and interpreted by me  CT Chest 23  Bilateral GGO. Small pleural effusions.

## 2023-05-08 NOTE — PROGRESS NOTE ADULT - PROBLEM SELECTOR PLAN 4
Normocytic anemia - likely 2/2 CKD  - Iron studies consistent with MILLY - will start iron supplementation  - Per nephro, started on darbepoitin  - Bowel regimen Normocytic anemia - likely 2/2 CKD  - Iron studies consistent with MILLY - will start iron supplementation  - Per nephro, started on darbepoitin weekly  - Bowel regimen

## 2023-05-08 NOTE — PROGRESS NOTE ADULT - ATTENDING COMMENTS
83 year old Bel[place]ian speaking female with PMH HTN, HLD, DM2, heart failure with preserved EF, CAD S/P CABG, breast CA and rectal CA who presented with shortness of breath due to CHF exacerbation. Her previous TTE had stage 2 diastolic dysfunction but most recent one has normal diastolic function. She was initially diuresed with IV Bumex but has been transitioned to PO per cardiology recs. She was on Bipap but has been weaned to 2L nasal cannula. Despite diuresis, the patient is unable to be weaned off of oxygen, will call pulmonary consult in order to assist in management of her hypoxic respiratory failure. The patient had choledocholithiasis s/p plastic biliary stent 1/20 and was due for repeat ERCP with stent exhange 4/26 as an outpatient however, she was admitted to the hospital. The ERCP can be done as an outpatient but the patient and family preference is to have it done while inpatient. The patient will need to be optimized from a cardiopulmonary standpoint prior to the procedure. If she cannot be weaned off of oxygen, will discuss doing the procedure while she is on 2L vs sending her home and having the procedure done as an outpatient. Rest of plan as above. Discussed using [place]  Eric [id number].

## 2023-05-08 NOTE — PROGRESS NOTE ADULT - SUBJECTIVE AND OBJECTIVE BOX
Sultan Sixto MD  PGY 1 Department of Internal Medicine        Patient is a 83y old  Female who presents with a chief complaint of Shortness of breath (07 May 2023 10:26)      SUBJECTIVE / OVERNIGHT EVENTS: Pt seen and examined. No acute overnight events. Denies fevers, chills, CP, SOB, Abdominal pain, N/V, Constipation, Diarrhea        MEDICATIONS  (STANDING):  albuterol/ipratropium for Nebulization 3 milliLiter(s) Nebulizer every 6 hours  aspirin  chewable 81 milliGRAM(s) Oral daily  atorvastatin 40 milliGRAM(s) Oral at bedtime  buMETAnide 1 milliGRAM(s) Oral two times a day  chlorhexidine 2% Cloths 1 Application(s) Topical <User Schedule>  darbepoetin Injectable ViaL 40 MICROGram(s) SubCutaneous every 7 days  dextrose 5%. 1000 milliLiter(s) (100 mL/Hr) IV Continuous <Continuous>  dextrose 5%. 1000 milliLiter(s) (50 mL/Hr) IV Continuous <Continuous>  dextrose 50% Injectable 25 Gram(s) IV Push once  dextrose 50% Injectable 12.5 Gram(s) IV Push once  dextrose 50% Injectable 25 Gram(s) IV Push once  ferrous    sulfate 325 milliGRAM(s) Oral daily  gabapentin 200 milliGRAM(s) Oral daily  glucagon  Injectable 1 milliGRAM(s) IntraMuscular once  heparin  Infusion. 1200 Unit(s)/Hr (12 mL/Hr) IV Continuous <Continuous>  insulin glargine Injectable (LANTUS) 10 Unit(s) SubCutaneous at bedtime  insulin lispro (ADMELOG) corrective regimen sliding scale   SubCutaneous at bedtime  insulin lispro (ADMELOG) corrective regimen sliding scale   SubCutaneous three times a day before meals  insulin lispro Injectable (ADMELOG) 5 Unit(s) SubCutaneous three times a day before meals  metoprolol tartrate 100 milliGRAM(s) Oral every 12 hours  Nephro-ashley 1 Tablet(s) Oral daily  pantoprazole    Tablet 40 milliGRAM(s) Oral before breakfast  polyethylene glycol 3350 17 Gram(s) Oral daily  senna 2 Tablet(s) Oral at bedtime  sodium bicarbonate 650 milliGRAM(s) Oral daily  spironolactone 25 milliGRAM(s) Oral daily    MEDICATIONS  (PRN):  acetaminophen     Tablet .. 650 milliGRAM(s) Oral every 6 hours PRN Temp greater or equal to 38C (100.4F), Mild Pain (1 - 3)  dextrose Oral Gel 15 Gram(s) Oral once PRN Blood Glucose LESS THAN 70 milliGRAM(s)/deciliter  melatonin 3 milliGRAM(s) Oral at bedtime PRN Insomnia      I&O's Summary    06 May 2023 07:01  -  07 May 2023 07:00  --------------------------------------------------------  IN: 480 mL / OUT: 1000 mL / NET: -520 mL    07 May 2023 07:01  -  08 May 2023 05:19  --------------------------------------------------------  IN: 480 mL / OUT: 1300 mL / NET: -820 mL        Vital Signs Last 24 Hrs  T(C): 36.4 (08 May 2023 04:33), Max: 37.2 (07 May 2023 11:07)  T(F): 97.6 (08 May 2023 04:33), Max: 98.9 (07 May 2023 11:07)  HR: 68 (08 May 2023 04:33) (68 - 74)  BP: 106/64 (08 May 2023 04:33) (106/64 - 125/61)  BP(mean): --  RR: 18 (08 May 2023 04:33) (18 - 18)  SpO2: 93% (08 May 2023 04:33) (93% - 98%)    Parameters below as of 08 May 2023 04:33  Patient On (Oxygen Delivery Method): nasal cannula  O2 Flow (L/min): 1      CAPILLARY BLOOD GLUCOSE      POCT Blood Glucose.: 190 mg/dL (07 May 2023 21:12)  POCT Blood Glucose.: 169 mg/dL (07 May 2023 16:59)  POCT Blood Glucose.: 258 mg/dL (07 May 2023 11:41)  POCT Blood Glucose.: 237 mg/dL (07 May 2023 07:46)      PHYSICAL EXAM:  GENERAL: Sitting in bed in no acute distress  EYES: Conjunctiva noninjected or pale, sclera anicteric  HENT: NC/AT, moist mucous membranes  NECK: Supple, trachea midline.  LUNG: CTAB. On 1L nasal cannula  CV: RRR, Pulses- Radial: 2+ b/l.   ABDOMEN: Nondistended, nontender  MSK: No visible deformities, nontender extremities. R arm with chronic lymphedema, no pitting edema noted in any other extremity.   SKIN: No rashes, bruises  NEURO: AAOx4 (to person, place, time, event), no tremor       LABS:                        9.2    10.04 )-----------( 265      ( 07 May 2023 06:49 )             31.7     Auto Eosinophil # x     / Auto Eosinophil % x     / Auto Neutrophil # x     / Auto Neutrophil % x     / BANDS % x                            9.2    9.76  )-----------( 272      ( 06 May 2023 07:05 )             32.2     Auto Eosinophil # x     / Auto Eosinophil % x     / Auto Neutrophil # x     / Auto Neutrophil % x     / BANDS % x        05-07    139  |  96  |  33<H>  ----------------------------<  251<H>  4.1   |  31  |  1.91<H>  05-06    139  |  97  |  33<H>  ----------------------------<  273<H>  4.2   |  29  |  1.85<H>    Ca    9.5      07 May 2023 06:49  Mg     1.8     05-07  Phos  3.6     05-07  TPro  7.0  /  Alb  3.6  /  TBili  0.4  /  DBili  x   /  AST  17  /  ALT  15  /  AlkPhos  90  05-07    PTT - ( 07 May 2023 06:49 )  PTT:64.0 sec              RADIOLOGY & ADDITIONAL TESTS:    Imaging Personally Reviewed:    Consultant(s) Notes Reviewed:      Care Discussed with Consultants/Other Providers:   Sultan Sixto MD  PGY 1 Department of Internal Medicine        Patient is a 83y old  Female who presents with a chief complaint of Shortness of breath (07 May 2023 10:26)      SUBJECTIVE / OVERNIGHT EVENTS: Pt seen and examined. No acute overnight events. Tele with brief 32 sec of PAF but otherwise sinus. Denies fevers, chills, CP, Abdominal pain, N/V, Constipation, Diarrhea. SOB improved.    Later in day desaturated to 85% on RA while ambulating and at rest. Placed on 2L. As per daughter seems to get SOB intermittently and based on review of flowsheets, desaturations are also intermittent.         MEDICATIONS  (STANDING):  albuterol/ipratropium for Nebulization 3 milliLiter(s) Nebulizer every 6 hours  aspirin  chewable 81 milliGRAM(s) Oral daily  atorvastatin 40 milliGRAM(s) Oral at bedtime  buMETAnide 1 milliGRAM(s) Oral two times a day  chlorhexidine 2% Cloths 1 Application(s) Topical <User Schedule>  darbepoetin Injectable ViaL 40 MICROGram(s) SubCutaneous every 7 days  dextrose 5%. 1000 milliLiter(s) (100 mL/Hr) IV Continuous <Continuous>  dextrose 5%. 1000 milliLiter(s) (50 mL/Hr) IV Continuous <Continuous>  dextrose 50% Injectable 25 Gram(s) IV Push once  dextrose 50% Injectable 12.5 Gram(s) IV Push once  dextrose 50% Injectable 25 Gram(s) IV Push once  ferrous    sulfate 325 milliGRAM(s) Oral daily  gabapentin 200 milliGRAM(s) Oral daily  glucagon  Injectable 1 milliGRAM(s) IntraMuscular once  heparin  Infusion. 1200 Unit(s)/Hr (12 mL/Hr) IV Continuous <Continuous>  insulin glargine Injectable (LANTUS) 10 Unit(s) SubCutaneous at bedtime  insulin lispro (ADMELOG) corrective regimen sliding scale   SubCutaneous at bedtime  insulin lispro (ADMELOG) corrective regimen sliding scale   SubCutaneous three times a day before meals  insulin lispro Injectable (ADMELOG) 5 Unit(s) SubCutaneous three times a day before meals  metoprolol tartrate 100 milliGRAM(s) Oral every 12 hours  Nephro-ashley 1 Tablet(s) Oral daily  pantoprazole    Tablet 40 milliGRAM(s) Oral before breakfast  polyethylene glycol 3350 17 Gram(s) Oral daily  senna 2 Tablet(s) Oral at bedtime  sodium bicarbonate 650 milliGRAM(s) Oral daily  spironolactone 25 milliGRAM(s) Oral daily    MEDICATIONS  (PRN):  acetaminophen     Tablet .. 650 milliGRAM(s) Oral every 6 hours PRN Temp greater or equal to 38C (100.4F), Mild Pain (1 - 3)  dextrose Oral Gel 15 Gram(s) Oral once PRN Blood Glucose LESS THAN 70 milliGRAM(s)/deciliter  melatonin 3 milliGRAM(s) Oral at bedtime PRN Insomnia      I&O's Summary    06 May 2023 07:01  -  07 May 2023 07:00  --------------------------------------------------------  IN: 480 mL / OUT: 1000 mL / NET: -520 mL    07 May 2023 07:01  -  08 May 2023 05:19  --------------------------------------------------------  IN: 480 mL / OUT: 1300 mL / NET: -820 mL        Vital Signs Last 24 Hrs  T(C): 36.4 (08 May 2023 04:33), Max: 37.2 (07 May 2023 11:07)  T(F): 97.6 (08 May 2023 04:33), Max: 98.9 (07 May 2023 11:07)  HR: 68 (08 May 2023 04:33) (68 - 74)  BP: 106/64 (08 May 2023 04:33) (106/64 - 125/61)  BP(mean): --  RR: 18 (08 May 2023 04:33) (18 - 18)  SpO2: 93% (08 May 2023 04:33) (93% - 98%)    Parameters below as of 08 May 2023 04:33  Patient On (Oxygen Delivery Method): nasal cannula  O2 Flow (L/min): 1      CAPILLARY BLOOD GLUCOSE      POCT Blood Glucose.: 190 mg/dL (07 May 2023 21:12)  POCT Blood Glucose.: 169 mg/dL (07 May 2023 16:59)  POCT Blood Glucose.: 258 mg/dL (07 May 2023 11:41)  POCT Blood Glucose.: 237 mg/dL (07 May 2023 07:46)      PHYSICAL EXAM:  GENERAL: Sitting in bed in no acute distress  EYES: Conjunctiva noninjected or pale, sclera anicteric  HENT: NC/AT, moist mucous membranes  NECK: Supple, trachea midline.  LUNG: Mild inspiratory crackles b/l. On 1L nasal cannula  CV: RRR, Pulses- Radial: 2+ b/l.   ABDOMEN: Nondistended, nontender  MSK: No visible deformities, nontender extremities. R arm with chronic lymphedema, mild pitting edema in LEs b/l.   SKIN: No rashes, bruises  NEURO: AAOx4 (to person, place, time, event), no tremor       LABS:                        9.2    10.04 )-----------( 265      ( 07 May 2023 06:49 )             31.7     Auto Eosinophil # x     / Auto Eosinophil % x     / Auto Neutrophil # x     / Auto Neutrophil % x     / BANDS % x                            9.2    9.76  )-----------( 272      ( 06 May 2023 07:05 )             32.2     Auto Eosinophil # x     / Auto Eosinophil % x     / Auto Neutrophil # x     / Auto Neutrophil % x     / BANDS % x        05-07    139  |  96  |  33<H>  ----------------------------<  251<H>  4.1   |  31  |  1.91<H>  05-06    139  |  97  |  33<H>  ----------------------------<  273<H>  4.2   |  29  |  1.85<H>    Ca    9.5      07 May 2023 06:49  Mg     1.8     05-07  Phos  3.6     05-07  TPro  7.0  /  Alb  3.6  /  TBili  0.4  /  DBili  x   /  AST  17  /  ALT  15  /  AlkPhos  90  05-07    PTT - ( 07 May 2023 06:49 )  PTT:64.0 sec              RADIOLOGY & ADDITIONAL TESTS:    Imaging Personally Reviewed:    Consultant(s) Notes Reviewed:      Care Discussed with Consultants/Other Providers:

## 2023-05-08 NOTE — PROGRESS NOTE ADULT - SUBJECTIVE AND OBJECTIVE BOX
NEPHROLOGY     Patient seen and examined resting comfortably, no complaints, on NC, in no acute distress.     MEDICATIONS  (STANDING):  albuterol/ipratropium for Nebulization 3 milliLiter(s) Nebulizer every 6 hours  aspirin  chewable 81 milliGRAM(s) Oral daily  atorvastatin 40 milliGRAM(s) Oral at bedtime  buMETAnide Injectable 1 milliGRAM(s) IV Push two times a day  chlorhexidine 2% Cloths 1 Application(s) Topical <User Schedule>  darbepoetin Injectable ViaL 40 MICROGram(s) SubCutaneous every 7 days  dextrose 5%. 1000 milliLiter(s) (100 mL/Hr) IV Continuous <Continuous>  dextrose 5%. 1000 milliLiter(s) (50 mL/Hr) IV Continuous <Continuous>  dextrose 50% Injectable 25 Gram(s) IV Push once  dextrose 50% Injectable 12.5 Gram(s) IV Push once  dextrose 50% Injectable 25 Gram(s) IV Push once  ferrous    sulfate 325 milliGRAM(s) Oral daily  gabapentin 200 milliGRAM(s) Oral daily  glucagon  Injectable 1 milliGRAM(s) IntraMuscular once  heparin  Infusion. 1200 Unit(s)/Hr (12 mL/Hr) IV Continuous <Continuous>  insulin glargine Injectable (LANTUS) 10 Unit(s) SubCutaneous at bedtime  insulin lispro (ADMELOG) corrective regimen sliding scale   SubCutaneous at bedtime  insulin lispro (ADMELOG) corrective regimen sliding scale   SubCutaneous three times a day before meals  insulin lispro Injectable (ADMELOG) 5 Unit(s) SubCutaneous three times a day before meals  metoprolol tartrate 100 milliGRAM(s) Oral every 12 hours  Nephro-ashley 1 Tablet(s) Oral daily  pantoprazole    Tablet 40 milliGRAM(s) Oral before breakfast  polyethylene glycol 3350 17 Gram(s) Oral daily  senna 2 Tablet(s) Oral at bedtime  sodium bicarbonate 650 milliGRAM(s) Oral daily  spironolactone 25 milliGRAM(s) Oral daily    VITALS:  T(C): , Max: 36.9 (05-06-23 @ 11:17)  T(F): , Max: 98.5 (05-06-23 @ 11:17)  HR: 76 (05-07-23 @ 04:52)  BP: 124/76 (05-07-23 @ 04:52)  RR: 18 (05-07-23 @ 04:52)  SpO2: 90% (05-07-23 @ 04:52)    I and O's:    05-06 @ 07:01  -  05-07 @ 07:00  --------------------------------------------------------  IN: 480 mL / OUT: 1000 mL / NET: -520 mL    PHYSICAL EXAM:  General: Alerted, oriented  HEENT: MMM  Lungs:CTA-b/l  Heart: RRR S1S2  Abdomen: Soft, NTND  Ext: + le edema  : no chen    LABS:                        9.2    10.04 )-----------( 265      ( 07 May 2023 06:49 )             31.7     05-07    139  |  96  |  33<H>  ----------------------------<  251<H>  4.1   |  31  |  1.91<H>    Ca    9.5      07 May 2023 06:49  Phos  3.6     05-07  Mg     1.8     05-07    TPro  7.0  /  Alb  3.6  /  TBili  0.4  /  DBili  x   /  AST  17  /  ALT  15  /  AlkPhos  90  05-07

## 2023-05-08 NOTE — PROGRESS NOTE ADULT - ASSESSMENT
ASSESSMENT:  Ms. Batista is a 84 YO woman with PMH of HTN, HLD, DM2, HFpEF (EF 65%), CAD s/p CABG, Breast CA s/p R partial mastectomy, rectal CA s/p resection with previous admission for acute rissa s/p ERCP w/ stent c/b gallbladder perf w/ perc rissa and recurrent SVT, and recent admission for CHF exacerbation presenting for shortness of breath.----out patient nephrologist Dr Kan .    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,  nephrology aware , previous diuretic test showed minimal  function of that kidney)  CVS- BP controlled at present  hypervolemia - improving  Electrolytes - controlled  Anemia- -ckd related  also consistent  iron deficiency , tsat 7, ferritin 28---sp iv venofer  5 doses completed on 4/3023   GI--planning for ERCP  met alkalosis --- in the view of diuretic --- cont to monitor     RECOMMENDATIONS  1)Renal: renal stable   bumex as ordered --1 mg PO x  bid   decrease spironolactone 12.5mg po daily ( anti ryan + diuretic )    monitor for alkalosis   keep k ~4 and mag 2  avoid nephrotoxic medication  dose all medications for GFR ~25 ml/min  daily bmp, mag and Po4   maintain renal diet   monitor I/Os  cont nebs   2)CVS: continue BP meds as ordered for now  3)cont daily oral iron   4)weekly aranesp 40 mcg SQ today           [facility]  Office: [phone]

## 2023-05-08 NOTE — PROGRESS NOTE ADULT - PROBLEM SELECTOR PLAN 1
ProBNP 2505, TTE 3/23 with EF 76%, severe pulmonary hypertension, moderate diastolic dysfunction. POCUS done in ED cw pulmonary edema  - CT chest 5/1 with unchanged pulmonary edema/effusions  - Per cardiology transition bumex 1mg IV BID to bumex 1 mg PO BID on 5/8  - c/w aldactone 12.5mg daily   - Strict I&Os  - Daily weights ProBNP 2505, TTE 3/23 with EF 76%, severe pulmonary hypertension, moderate diastolic dysfunction. POCUS done in ED cw pulmonary edema  - CT chest 5/1 with unchanged pulmonary edema/effusions  - Per cardiology transition bumex 1mg IV BID to bumex 1 mg PO BID on 5/8  - c/w aldactone 12.5mg daily   - Strict I&Os (net negative past 24 hours)  - Daily weights  - cardiology stating pt is now euvolemic; rec pulm eval

## 2023-05-09 LAB
ALBUMIN SERPL ELPH-MCNC: 3.5 G/DL — SIGNIFICANT CHANGE UP (ref 3.3–5)
ALP SERPL-CCNC: 83 U/L — SIGNIFICANT CHANGE UP (ref 40–120)
ALT FLD-CCNC: 16 U/L — SIGNIFICANT CHANGE UP (ref 10–45)
ANION GAP SERPL CALC-SCNC: 13 MMOL/L — SIGNIFICANT CHANGE UP (ref 5–17)
APTT BLD: 63.9 SEC — HIGH (ref 27.5–35.5)
AST SERPL-CCNC: 12 U/L — SIGNIFICANT CHANGE UP (ref 10–40)
BASOPHILS # BLD AUTO: 0.03 K/UL — SIGNIFICANT CHANGE UP (ref 0–0.2)
BASOPHILS NFR BLD AUTO: 0.4 % — SIGNIFICANT CHANGE UP (ref 0–2)
BILIRUB SERPL-MCNC: 0.2 MG/DL — SIGNIFICANT CHANGE UP (ref 0.2–1.2)
BUN SERPL-MCNC: 36 MG/DL — HIGH (ref 7–23)
CALCIUM SERPL-MCNC: 9.5 MG/DL — SIGNIFICANT CHANGE UP (ref 8.4–10.5)
CHLORIDE SERPL-SCNC: 97 MMOL/L — SIGNIFICANT CHANGE UP (ref 96–108)
CO2 SERPL-SCNC: 31 MMOL/L — SIGNIFICANT CHANGE UP (ref 22–31)
CREAT SERPL-MCNC: 1.96 MG/DL — HIGH (ref 0.5–1.3)
CULTURE RESULTS: SIGNIFICANT CHANGE UP
EGFR: 25 ML/MIN/1.73M2 — LOW
EOSINOPHIL # BLD AUTO: 0.12 K/UL — SIGNIFICANT CHANGE UP (ref 0–0.5)
EOSINOPHIL NFR BLD AUTO: 1.5 % — SIGNIFICANT CHANGE UP (ref 0–6)
GLUCOSE BLDC GLUCOMTR-MCNC: 216 MG/DL — HIGH (ref 70–99)
GLUCOSE BLDC GLUCOMTR-MCNC: 220 MG/DL — HIGH (ref 70–99)
GLUCOSE BLDC GLUCOMTR-MCNC: 270 MG/DL — HIGH (ref 70–99)
GLUCOSE BLDC GLUCOMTR-MCNC: 271 MG/DL — HIGH (ref 70–99)
GLUCOSE SERPL-MCNC: 239 MG/DL — HIGH (ref 70–99)
HCT VFR BLD CALC: 31.9 % — LOW (ref 34.5–45)
HGB BLD-MCNC: 9.4 G/DL — LOW (ref 11.5–15.5)
IMM GRANULOCYTES NFR BLD AUTO: 0.4 % — SIGNIFICANT CHANGE UP (ref 0–0.9)
LYMPHOCYTES # BLD AUTO: 1.56 K/UL — SIGNIFICANT CHANGE UP (ref 1–3.3)
LYMPHOCYTES # BLD AUTO: 18.9 % — SIGNIFICANT CHANGE UP (ref 13–44)
MAGNESIUM SERPL-MCNC: 1.8 MG/DL — SIGNIFICANT CHANGE UP (ref 1.6–2.6)
MCHC RBC-ENTMCNC: 27.8 PG — SIGNIFICANT CHANGE UP (ref 27–34)
MCHC RBC-ENTMCNC: 29.5 GM/DL — LOW (ref 32–36)
MCV RBC AUTO: 94.4 FL — SIGNIFICANT CHANGE UP (ref 80–100)
MONOCYTES # BLD AUTO: 0.6 K/UL — SIGNIFICANT CHANGE UP (ref 0–0.9)
MONOCYTES NFR BLD AUTO: 7.3 % — SIGNIFICANT CHANGE UP (ref 2–14)
NEUTROPHILS # BLD AUTO: 5.9 K/UL — SIGNIFICANT CHANGE UP (ref 1.8–7.4)
NEUTROPHILS NFR BLD AUTO: 71.5 % — SIGNIFICANT CHANGE UP (ref 43–77)
NRBC # BLD: 0 /100 WBCS — SIGNIFICANT CHANGE UP (ref 0–0)
PHOSPHATE SERPL-MCNC: 3.8 MG/DL — SIGNIFICANT CHANGE UP (ref 2.5–4.5)
PLATELET # BLD AUTO: 263 K/UL — SIGNIFICANT CHANGE UP (ref 150–400)
POTASSIUM SERPL-MCNC: 3.7 MMOL/L — SIGNIFICANT CHANGE UP (ref 3.5–5.3)
POTASSIUM SERPL-SCNC: 3.7 MMOL/L — SIGNIFICANT CHANGE UP (ref 3.5–5.3)
PROT SERPL-MCNC: 6.7 G/DL — SIGNIFICANT CHANGE UP (ref 6–8.3)
RBC # BLD: 3.38 M/UL — LOW (ref 3.8–5.2)
RBC # FLD: 20 % — HIGH (ref 10.3–14.5)
SODIUM SERPL-SCNC: 141 MMOL/L — SIGNIFICANT CHANGE UP (ref 135–145)
SPECIMEN SOURCE: SIGNIFICANT CHANGE UP
WBC # BLD: 8.24 K/UL — SIGNIFICANT CHANGE UP (ref 3.8–10.5)
WBC # FLD AUTO: 8.24 K/UL — SIGNIFICANT CHANGE UP (ref 3.8–10.5)

## 2023-05-09 PROCEDURE — 99233 SBSQ HOSP IP/OBS HIGH 50: CPT | Mod: GC

## 2023-05-09 RX ORDER — INSULIN LISPRO 100/ML
6 VIAL (ML) SUBCUTANEOUS
Refills: 0 | Status: DISCONTINUED | OUTPATIENT
Start: 2023-05-09 | End: 2023-05-10

## 2023-05-09 RX ORDER — INSULIN GLARGINE 100 [IU]/ML
14 INJECTION, SOLUTION SUBCUTANEOUS AT BEDTIME
Refills: 0 | Status: DISCONTINUED | OUTPATIENT
Start: 2023-05-09 | End: 2023-05-10

## 2023-05-09 RX ORDER — POTASSIUM CHLORIDE 20 MEQ
40 PACKET (EA) ORAL ONCE
Refills: 0 | Status: COMPLETED | OUTPATIENT
Start: 2023-05-09 | End: 2023-05-09

## 2023-05-09 RX ORDER — MAGNESIUM OXIDE 400 MG ORAL TABLET 241.3 MG
400 TABLET ORAL ONCE
Refills: 0 | Status: COMPLETED | OUTPATIENT
Start: 2023-05-09 | End: 2023-05-09

## 2023-05-09 RX ORDER — INSULIN GLARGINE 100 [IU]/ML
12 INJECTION, SOLUTION SUBCUTANEOUS AT BEDTIME
Refills: 0 | Status: DISCONTINUED | OUTPATIENT
Start: 2023-05-09 | End: 2023-05-09

## 2023-05-09 RX ADMIN — INSULIN GLARGINE 14 UNIT(S): 100 INJECTION, SOLUTION SUBCUTANEOUS at 21:48

## 2023-05-09 RX ADMIN — Medication 100 MILLIGRAM(S): at 05:18

## 2023-05-09 RX ADMIN — Medication 6 UNIT(S): at 11:56

## 2023-05-09 RX ADMIN — HEPARIN SODIUM 1200 UNIT(S)/HR: 5000 INJECTION INTRAVENOUS; SUBCUTANEOUS at 19:10

## 2023-05-09 RX ADMIN — GABAPENTIN 200 MILLIGRAM(S): 400 CAPSULE ORAL at 11:28

## 2023-05-09 RX ADMIN — Medication 1 TABLET(S): at 11:28

## 2023-05-09 RX ADMIN — Medication 100 MILLIGRAM(S): at 17:44

## 2023-05-09 RX ADMIN — Medication 6 UNIT(S): at 17:43

## 2023-05-09 RX ADMIN — ATORVASTATIN CALCIUM 40 MILLIGRAM(S): 80 TABLET, FILM COATED ORAL at 21:25

## 2023-05-09 RX ADMIN — Medication 100 MILLIGRAM(S): at 14:45

## 2023-05-09 RX ADMIN — Medication 3: at 11:55

## 2023-05-09 RX ADMIN — Medication 325 MILLIGRAM(S): at 11:28

## 2023-05-09 RX ADMIN — Medication 2: at 17:43

## 2023-05-09 RX ADMIN — HEPARIN SODIUM 1200 UNIT(S)/HR: 5000 INJECTION INTRAVENOUS; SUBCUTANEOUS at 14:51

## 2023-05-09 RX ADMIN — Medication 40 MILLIEQUIVALENT(S): at 11:27

## 2023-05-09 RX ADMIN — Medication 100 MILLIGRAM(S): at 05:16

## 2023-05-09 RX ADMIN — Medication 81 MILLIGRAM(S): at 11:28

## 2023-05-09 RX ADMIN — CHLORHEXIDINE GLUCONATE 1 APPLICATION(S): 213 SOLUTION TOPICAL at 05:17

## 2023-05-09 RX ADMIN — Medication 1: at 21:25

## 2023-05-09 RX ADMIN — HEPARIN SODIUM 1200 UNIT(S)/HR: 5000 INJECTION INTRAVENOUS; SUBCUTANEOUS at 06:59

## 2023-05-09 RX ADMIN — SENNA PLUS 2 TABLET(S): 8.6 TABLET ORAL at 21:24

## 2023-05-09 RX ADMIN — Medication 2: at 07:43

## 2023-05-09 RX ADMIN — Medication 3 MILLILITER(S): at 11:28

## 2023-05-09 RX ADMIN — Medication 3 MILLILITER(S): at 05:17

## 2023-05-09 RX ADMIN — PANTOPRAZOLE SODIUM 40 MILLIGRAM(S): 20 TABLET, DELAYED RELEASE ORAL at 05:56

## 2023-05-09 RX ADMIN — BUMETANIDE 1 MILLIGRAM(S): 0.25 INJECTION INTRAMUSCULAR; INTRAVENOUS at 05:17

## 2023-05-09 RX ADMIN — HEPARIN SODIUM 1200 UNIT(S)/HR: 5000 INJECTION INTRAVENOUS; SUBCUTANEOUS at 21:23

## 2023-05-09 RX ADMIN — Medication 5 UNIT(S): at 07:44

## 2023-05-09 RX ADMIN — SPIRONOLACTONE 12.5 MILLIGRAM(S): 25 TABLET, FILM COATED ORAL at 05:14

## 2023-05-09 RX ADMIN — MAGNESIUM OXIDE 400 MG ORAL TABLET 400 MILLIGRAM(S): 241.3 TABLET ORAL at 10:03

## 2023-05-09 RX ADMIN — CEFTRIAXONE 100 MILLIGRAM(S): 500 INJECTION, POWDER, FOR SOLUTION INTRAMUSCULAR; INTRAVENOUS at 17:44

## 2023-05-09 RX ADMIN — Medication 100 MILLIGRAM(S): at 21:24

## 2023-05-09 RX ADMIN — Medication 3 MILLILITER(S): at 17:44

## 2023-05-09 RX ADMIN — BUMETANIDE 1 MILLIGRAM(S): 0.25 INJECTION INTRAMUSCULAR; INTRAVENOUS at 14:46

## 2023-05-09 NOTE — PROGRESS NOTE ADULT - PROBLEM SELECTOR PLAN 1
ProBNP 2505, TTE 3/23 with EF 76%, severe pulmonary hypertension, moderate diastolic dysfunction. POCUS done in ED cw pulmonary edema  - CT chest 5/1 with unchanged pulmonary edema/effusions  - Per cardiology transition bumex 1mg IV BID to bumex 1 mg PO BID on 5/8  - c/w aldactone 12.5mg daily   - Strict I&Os (net negative past 24 hours)  - Daily weights  - cardiology stating pt is now euvolemic; rec pulm eval ProBNP 2505, TTE 3/23 with EF 76%, severe pulmonary hypertension, moderate diastolic dysfunction. POCUS done in ED cw pulmonary edema  - CT chest 5/1 with unchanged pulmonary edema/effusions  - Per cardiology transition bumex 1mg IV BID to bumex 1 mg PO BID on 5/8  - c/w aldactone 12.5mg daily   - Strict I&Os (net negative past 24 hours)  - Daily weights (neg 4 kg since admission)  - cardiology stating pt is now euvolemic; rec pulm eval -> both services recommending RHC; daughter will discuss with patient ProBNP 2505, TTE 3/23 with EF 76%, severe pulmonary hypertension, moderate diastolic dysfunction. POCUS done in ED cw pulmonary edema  - CT chest 5/1 with unchanged pulmonary edema/effusions  - Per cardiology transition bumex 1mg IV BID to bumex 1 mg PO BID on 5/8  - c/w aldactone 12.5mg daily   - Strict I&Os (net negative past 24 hours)  - Daily weights (neg 4 kg since admission)  - cardiology stating pt is now euvolemic; rec pulm eval -> both services recommending RHC, potentially today 5/9

## 2023-05-09 NOTE — PROGRESS NOTE ADULT - PROBLEM SELECTOR PLAN 2
- Currently on 1L NC  - Will attempt to wean to room air  - Not on home O2  - Duonebs q6 for wheezes  - Incentive spirometry  - will have pulm evaluate patient given mod PH as a possible etiology of her hypoxia  - might require home O2 setup

## 2023-05-09 NOTE — PROGRESS NOTE ADULT - ATTENDING COMMENTS
83 year old Occitan speaking female with PMH HTN, HLD, DM2, heart failure with preserved EF, CAD S/P CABG, breast CA and rectal CA who presented with shortness of breath due to CHF exacerbation. She was initially diuresed with IV Bumex but has been transitioned to PO per cardiology recs. She was on Bipap but has been weaned to 2L nasal cannula. Despite diuresis, the patient is unable to be weaned off of oxygen. Pulmonary consult was placed and it was recommended that the patient undergo a RHC which is scheduled for later today. The patient had choledocholithiasis s/p plastic biliary stent 1/20 and was due for repeat ERCP with stent exhange 4/26 as an outpatient however, she was admitted to the hospital. The ERCP can be done as an outpatient but the patient and family preference is to have it done while inpatient. The patient will need to be optimized from a cardiopulmonary standpoint prior to the procedure. If she cannot be weaned off of oxygen, will discuss doing the procedure while she is on 2L vs sending her home and having the procedure done as an outpatient. Her blood glucose levels have been elevated, will increase insulin. Rest of plan as above.

## 2023-05-09 NOTE — PROGRESS NOTE ADULT - PROBLEM SELECTOR PLAN 5
Currently at baseline 1.5-1.7  slight increase to high 1.9 range  -nephrology following and stating still within normal kidney function

## 2023-05-09 NOTE — PROGRESS NOTE ADULT - SUBJECTIVE AND OBJECTIVE BOX
Sultan Sixto MD  PGY 1 Department of Internal Medicine        Patient is a 83y old  Female who presents with a chief complaint of Shortness of breath (08 May 2023 16:43)      SUBJECTIVE / OVERNIGHT EVENTS: Pt seen and examined. No acute overnight events. Denies fevers, chills, CP, SOB, Abdominal pain, N/V, Constipation, Diarrhea        MEDICATIONS  (STANDING):  albuterol/ipratropium for Nebulization 3 milliLiter(s) Nebulizer every 6 hours  aspirin  chewable 81 milliGRAM(s) Oral daily  atorvastatin 40 milliGRAM(s) Oral at bedtime  buMETAnide 1 milliGRAM(s) Oral two times a day  cefTRIAXone   IVPB 1000 milliGRAM(s) IV Intermittent every 24 hours  chlorhexidine 2% Cloths 1 Application(s) Topical <User Schedule>  darbepoetin Injectable ViaL 40 MICROGram(s) SubCutaneous every 7 days  dextrose 5%. 1000 milliLiter(s) (100 mL/Hr) IV Continuous <Continuous>  dextrose 5%. 1000 milliLiter(s) (50 mL/Hr) IV Continuous <Continuous>  dextrose 50% Injectable 25 Gram(s) IV Push once  dextrose 50% Injectable 12.5 Gram(s) IV Push once  dextrose 50% Injectable 25 Gram(s) IV Push once  ferrous    sulfate 325 milliGRAM(s) Oral daily  gabapentin 200 milliGRAM(s) Oral daily  glucagon  Injectable 1 milliGRAM(s) IntraMuscular once  heparin  Infusion. 1200 Unit(s)/Hr (12 mL/Hr) IV Continuous <Continuous>  insulin glargine Injectable (LANTUS) 10 Unit(s) SubCutaneous at bedtime  insulin lispro (ADMELOG) corrective regimen sliding scale   SubCutaneous three times a day before meals  insulin lispro (ADMELOG) corrective regimen sliding scale   SubCutaneous at bedtime  insulin lispro Injectable (ADMELOG) 5 Unit(s) SubCutaneous three times a day before meals  metoprolol tartrate 100 milliGRAM(s) Oral every 12 hours  Nephro-ashley 1 Tablet(s) Oral daily  pantoprazole    Tablet 40 milliGRAM(s) Oral before breakfast  phenazopyridine 100 milliGRAM(s) Oral every 8 hours  polyethylene glycol 3350 17 Gram(s) Oral daily  senna 2 Tablet(s) Oral at bedtime  sodium bicarbonate 650 milliGRAM(s) Oral daily  spironolactone 12.5 milliGRAM(s) Oral daily    MEDICATIONS  (PRN):  acetaminophen     Tablet .. 650 milliGRAM(s) Oral every 6 hours PRN Temp greater or equal to 38C (100.4F), Mild Pain (1 - 3)  dextrose Oral Gel 15 Gram(s) Oral once PRN Blood Glucose LESS THAN 70 milliGRAM(s)/deciliter  melatonin 3 milliGRAM(s) Oral at bedtime PRN Insomnia      I&O's Summary    08 May 2023 07:01  -  09 May 2023 07:00  --------------------------------------------------------  IN: 1044 mL / OUT: 1900 mL / NET: -856 mL        Vital Signs Last 24 Hrs  T(C): 36.6 (09 May 2023 04:47), Max: 36.6 (08 May 2023 20:06)  T(F): 97.8 (09 May 2023 04:47), Max: 97.8 (08 May 2023 20:06)  HR: 71 (09 May 2023 04:47) (66 - 77)  BP: 113/69 (09 May 2023 04:47) (101/64 - 135/80)  BP(mean): --  RR: 18 (09 May 2023 04:47) (18 - 18)  SpO2: 90% (09 May 2023 04:47) (84% - 97%)    Parameters below as of 09 May 2023 04:47  Patient On (Oxygen Delivery Method): nasal cannula  O2 Flow (L/min): 2      CAPILLARY BLOOD GLUCOSE      POCT Blood Glucose.: 276 mg/dL (08 May 2023 21:10)  POCT Blood Glucose.: 240 mg/dL (08 May 2023 17:10)  POCT Blood Glucose.: 293 mg/dL (08 May 2023 11:28)  POCT Blood Glucose.: 239 mg/dL (08 May 2023 07:34)      PHYSICAL EXAM:  GENERAL: Sitting in bed in no acute distress  EYES: Conjunctiva noninjected or pale, sclera anicteric  HENT: NC/AT, moist mucous membranes  NECK: Supple, trachea midline.  LUNG: Mild inspiratory crackles b/l. On 1L nasal cannula  CV: RRR, Pulses- Radial: 2+ b/l.   ABDOMEN: Nondistended, nontender  MSK: No visible deformities, nontender extremities. R arm with chronic lymphedema, mild pitting edema in LEs b/l.   SKIN: No rashes, bruises  NEURO: AAOx4 (to person, place, time, event), no tremor         LABS:                        9.6    8.10  )-----------( 240      ( 08 May 2023 06:18 )             33.3     Auto Eosinophil # x     / Auto Eosinophil % x     / Auto Neutrophil # x     / Auto Neutrophil % x     / BANDS % x        -    141  |  96  |  32<H>  ----------------------------<  214<H>  4.0   |  33<H>  |  1.96<H>    Ca    9.7      08 May 2023 06:20  Mg     1.8       Phos  3.4     05-08    PTT - ( 08 May 2023 06:18 )  PTT:65.6 sec      Urinalysis Basic - ( 08 May 2023 15:14 )    Color: Yellow / Appearance: Slightly Turbid / S.012 / pH: x  Gluc: x / Ketone: Negative  / Bili: Negative / Urobili: Negative   Blood: x / Protein: Trace / Nitrite: Negative   Leuk Esterase: Large / RBC: 6 /hpf /  /HPF   Sq Epi: x / Non Sq Epi: x / Bacteria: Many            RADIOLOGY & ADDITIONAL TESTS:    Imaging Personally Reviewed:    Consultant(s) Notes Reviewed:      Care Discussed with Consultants/Other Providers:   Sultan Sixto MD  PGY 1 Department of Internal Medicine        Patient is a 83y old  Female who presents with a chief complaint of Shortness of breath (08 May 2023 16:43)      SUBJECTIVE / OVERNIGHT EVENTS: Pt seen and examined. No acute overnight events. Tele NS 60-70s with PACs. O2 sat 90-98 on 2L. Denies fevers, chills, CP, SOB, Abdominal pain, N/V, Constipation, Diarrhea. SOB stable on 2L NC. Had some dysuria yesterday that is now improved s/p abx and pyridium being started.        MEDICATIONS  (STANDING):  albuterol/ipratropium for Nebulization 3 milliLiter(s) Nebulizer every 6 hours  aspirin  chewable 81 milliGRAM(s) Oral daily  atorvastatin 40 milliGRAM(s) Oral at bedtime  buMETAnide 1 milliGRAM(s) Oral two times a day  cefTRIAXone   IVPB 1000 milliGRAM(s) IV Intermittent every 24 hours  chlorhexidine 2% Cloths 1 Application(s) Topical <User Schedule>  darbepoetin Injectable ViaL 40 MICROGram(s) SubCutaneous every 7 days  dextrose 5%. 1000 milliLiter(s) (100 mL/Hr) IV Continuous <Continuous>  dextrose 5%. 1000 milliLiter(s) (50 mL/Hr) IV Continuous <Continuous>  dextrose 50% Injectable 25 Gram(s) IV Push once  dextrose 50% Injectable 12.5 Gram(s) IV Push once  dextrose 50% Injectable 25 Gram(s) IV Push once  ferrous    sulfate 325 milliGRAM(s) Oral daily  gabapentin 200 milliGRAM(s) Oral daily  glucagon  Injectable 1 milliGRAM(s) IntraMuscular once  heparin  Infusion. 1200 Unit(s)/Hr (12 mL/Hr) IV Continuous <Continuous>  insulin glargine Injectable (LANTUS) 10 Unit(s) SubCutaneous at bedtime  insulin lispro (ADMELOG) corrective regimen sliding scale   SubCutaneous three times a day before meals  insulin lispro (ADMELOG) corrective regimen sliding scale   SubCutaneous at bedtime  insulin lispro Injectable (ADMELOG) 5 Unit(s) SubCutaneous three times a day before meals  metoprolol tartrate 100 milliGRAM(s) Oral every 12 hours  Nephro-ashley 1 Tablet(s) Oral daily  pantoprazole    Tablet 40 milliGRAM(s) Oral before breakfast  phenazopyridine 100 milliGRAM(s) Oral every 8 hours  polyethylene glycol 3350 17 Gram(s) Oral daily  senna 2 Tablet(s) Oral at bedtime  sodium bicarbonate 650 milliGRAM(s) Oral daily  spironolactone 12.5 milliGRAM(s) Oral daily    MEDICATIONS  (PRN):  acetaminophen     Tablet .. 650 milliGRAM(s) Oral every 6 hours PRN Temp greater or equal to 38C (100.4F), Mild Pain (1 - 3)  dextrose Oral Gel 15 Gram(s) Oral once PRN Blood Glucose LESS THAN 70 milliGRAM(s)/deciliter  melatonin 3 milliGRAM(s) Oral at bedtime PRN Insomnia      I&O's Summary    08 May 2023 07:01  -  09 May 2023 07:00  --------------------------------------------------------  IN: 1044 mL / OUT: 1900 mL / NET: -856 mL        Vital Signs Last 24 Hrs  T(C): 36.6 (09 May 2023 04:47), Max: 36.6 (08 May 2023 20:06)  T(F): 97.8 (09 May 2023 04:47), Max: 97.8 (08 May 2023 20:06)  HR: 71 (09 May 2023 04:47) (66 - 77)  BP: 113/69 (09 May 2023 04:47) (101/64 - 135/80)  BP(mean): --  RR: 18 (09 May 2023 04:47) (18 - 18)  SpO2: 90% (09 May 2023 04:47) (84% - 97%)    Parameters below as of 09 May 2023 04:47  Patient On (Oxygen Delivery Method): nasal cannula  O2 Flow (L/min): 2      CAPILLARY BLOOD GLUCOSE      POCT Blood Glucose.: 276 mg/dL (08 May 2023 21:10)  POCT Blood Glucose.: 240 mg/dL (08 May 2023 17:10)  POCT Blood Glucose.: 293 mg/dL (08 May 2023 11:28)  POCT Blood Glucose.: 239 mg/dL (08 May 2023 07:34)      PHYSICAL EXAM:  GENERAL: Sitting in bed in no acute distress  EYES: Conjunctiva noninjected or pale, sclera anicteric  HENT: NC/AT, moist mucous membranes  NECK: Supple, trachea midline.  LUNG: Mild inspiratory crackles b/l. On 2L nasal cannula  CV: RRR, Pulses- Radial: 2+ b/l.   ABDOMEN: Nondistended, nontender  MSK: No visible deformities, nontender extremities. R arm with chronic lymphedema, mild pitting edema in LEs b/l.   SKIN: No rashes, bruises  NEURO: AAOx4 (to person, place, time, event), no tremor         LABS:                        9.6    8.10  )-----------( 240      ( 08 May 2023 06:18 )             33.3     Auto Eosinophil # x     / Auto Eosinophil % x     / Auto Neutrophil # x     / Auto Neutrophil % x     / BANDS % x        -    141  |  96  |  32<H>  ----------------------------<  214<H>  4.0   |  33<H>  |  1.96<H>    Ca    9.7      08 May 2023 06:20  Mg     1.8     -  Phos  3.4     05-08    PTT - ( 08 May 2023 06:18 )  PTT:65.6 sec      Urinalysis Basic - ( 08 May 2023 15:14 )    Color: Yellow / Appearance: Slightly Turbid / S.012 / pH: x  Gluc: x / Ketone: Negative  / Bili: Negative / Urobili: Negative   Blood: x / Protein: Trace / Nitrite: Negative   Leuk Esterase: Large / RBC: 6 /hpf /  /HPF   Sq Epi: x / Non Sq Epi: x / Bacteria: Many            RADIOLOGY & ADDITIONAL TESTS:    Imaging Personally Reviewed:    Consultant(s) Notes Reviewed:      Care Discussed with Consultants/Other Providers:

## 2023-05-09 NOTE — PROGRESS NOTE ADULT - PROBLEM SELECTOR PLAN 2
- Currently on 1L NC  - Will attempt to wean to room air  - Not on home O2  - Duonebs q6 for wheezes  - Incentive spirometry  - will have pulm evaluate patient given mod PH as a possible etiology of her hypoxia  - might require home O2 setup - Currently on 2L NC  - goal sat 92-95%  - Not on home O2  - Duonebs q6 for wheezes  - Incentive spirometry  - will have pulm evaluate patient given mod PH as a possible etiology of her hypoxia -> potential RHC  - might require home O2 setup - Currently on 2L NC  - goal sat 92-95%  - Not on home O2  - Duonebs q6 for wheezes  - Incentive spirometry  - will have pulm evaluate patient given mod PH as a possible etiology of her hypoxia -> potential RHC 5/9  - might require home O2 setup

## 2023-05-09 NOTE — PROGRESS NOTE ADULT - PROBLEM SELECTOR PLAN 1
ProBNP 2505, TTE 3/23 with EF 76%, severe pulmonary hypertension, moderate diastolic dysfunction. POCUS done in ED cw pulmonary edema  - CT chest 5/1 with unchanged pulmonary edema/effusions  - Per cardiology transition bumex 1mg IV BID to bumex 1 mg PO BID on 5/8  - c/w aldactone 12.5mg daily   - Strict I&Os (net negative past 24 hours)  - Daily weights  - cardiology stating pt is now euvolemic; rec pulm eval

## 2023-05-09 NOTE — PROGRESS NOTE ADULT - PROBLEM SELECTOR PLAN 6
A1c 7.8  Home basaglar 21/novolog 7 TID AC  - on 10 Lantus and 5 lispro tid A1c 7.8  Home basaglar 21/novolog 7 TID AC  - on 10 Lantus and 5 lispro tid; consider inc to 14 + 6 tid

## 2023-05-09 NOTE — PROGRESS NOTE ADULT - SUBJECTIVE AND OBJECTIVE BOX
DATE OF SERVICE: 05-09-23 @ 10:57    Patient is a 83y old  Female who presents with a chief complaint of Shortness of breath (09 May 2023 07:01)      INTERVAL HISTORY: Feels ok.     REVIEW OF SYSTEMS:  CONSTITUTIONAL: No weakness  EYES/ENT: No visual changes;  No throat pain   NECK: No pain or stiffness  RESPIRATORY: No cough, wheezing; No shortness of breath  CARDIOVASCULAR: No chest pain or palpitations  GASTROINTESTINAL: No abdominal  pain. No nausea, vomiting, or hematemesis  GENITOURINARY: No dysuria, frequency or hematuria  NEUROLOGICAL: No stroke like symptoms  SKIN: No rashes    TELEMETRY Personally reviewed: SR 65-75 PACs  	  MEDICATIONS:  buMETAnide 1 milliGRAM(s) Oral two times a day  metoprolol tartrate 100 milliGRAM(s) Oral every 12 hours  spironolactone 12.5 milliGRAM(s) Oral daily        PHYSICAL EXAM:  T(C): 36.6 (05-09-23 @ 04:47), Max: 36.6 (05-08-23 @ 20:06)  HR: 71 (05-09-23 @ 04:47) (66 - 73)  BP: 113/69 (05-09-23 @ 04:47) (101/64 - 135/80)  RR: 18 (05-09-23 @ 04:47) (18 - 18)  SpO2: 90% (05-09-23 @ 04:47) (90% - 97%)  Wt(kg): --  I&O's Summary    08 May 2023 07:01  -  09 May 2023 07:00  --------------------------------------------------------  IN: 1044 mL / OUT: 1900 mL / NET: -856 mL    09 May 2023 07:01  -  09 May 2023 10:57  --------------------------------------------------------  IN: 280 mL / OUT: 0 mL / NET: 280 mL          Appearance: In no distress	  HEENT:    PERRL, EOMI	  Cardiovascular:  S1 S2, No JVD  Respiratory: Lungs clear to auscultation	  Gastrointestinal:  Soft, Non-tender, + BS	  Vascularature:  No edema of LE  Psychiatric: Appropriate affect   Neuro: no acute focal deficits                               9.4    8.24  )-----------( 263      ( 09 May 2023 06:47 )             31.9     05-09    141  |  97  |  36<H>  ----------------------------<  239<H>  3.7   |  31  |  1.96<H>    Ca    9.5      09 May 2023 06:42  Phos  3.8     05-09  Mg     1.8     05-09    TPro  6.7  /  Alb  3.5  /  TBili  0.2  /  DBili  x   /  AST  12  /  ALT  16  /  AlkPhos  83  05-09        Labs personally reviewed      ASSESSMENT/PLAN: 	    Ms. Batista is a 82 YO woman with PMH of HTN, HLD, DM2, HFpEF (EF 65%), CAD s/p CABG, Breast CA s/p R partial mastectomy, rectal CA s/p resection with recent admission for acute rissa s/p ERCP w/ stent c/b gallbladder perf w/ perc rissa and recurrent SVT, presenting for shortness of breath. Denies CP, palpitations, orthopnea or syncope. Follows as OP with Dr. Hagan.    Problem/Plan -1  Problem: Acute on Chronic Diastolic CHF  - POCUS suggestive of pulm edema  - CXR with no pulm edema or effusion. Prior CT one month ago with moderate effusions and pulm edema  - proBNP 2505 --> 1536  - Prior TTE 3/23 shows preserved EF, hyperdynamic LV function, basal inferoseptum hypokinesis, decreased RV systolic function severe pulm pressures, B/L pleural effusions  - Repeat TTE 5/6 Normal left ventricular cavity size. The left ventricular wall thickness is normal. The left ventricular systolic function is normal, EF 62%. No RWA. Mod PH. PAS pressure 56 mmHg.  - Given elevated PAS and ongoing hypoxia recommend RHC in order to assess hemodynamics- planned for today 5/9  - Strict I&Os, daily weights  - Reports SOB, still requires supplemental O2 intermittently   - Continue Bumex 1mg BID PO   - Initiated [place] 25mg daily     Problem/Plan -2  Problem: CAD   - s/p CABG 2020  - ECG with no ischemia noted  - Trop 21  - c/w ASA, metoprolol and statin  - Prior TTE 3/23 shows preserved EF, hyperdynamic LV function, basal inferoseptal hypokinesis, decreased RV systolic function severe pulm pressures   - 5/5 c/o CP/SOB: trop/CKMB/ECG wnl.     Problem/Plan -3  Problem: hx of Recurrent SVT  - c/w Metoprolol 100mg PO BID  - cont to monitor on tele    Problem/Plan -4  Problem: Atrial Fibrillation  - Patient with n/o A-flutter on previous admission  - c/w Metoprolol   - eliquis 2.5mg PO BID on hold   - c/w Heparin gtt    Problem/Plan- 5  Problem: Hx of Cholecystitis  - ERCP on hold pending improvement in respiratory function        Delaney Vasquez, FABIOLA-NP   Sergey Winchester DO [facility]  Cardiovascular Medicine  [address] [place] [address], [address]  Available through call or text on [employer]  Office: [phone]   DATE OF SERVICE: 05-09-23 @ 10:57    Patient is a 83y old  Female who presents with a chief complaint of Shortness of breath (09 May 2023 07:01)      INTERVAL HISTORY: Feels ok.     REVIEW OF SYSTEMS:  CONSTITUTIONAL: No weakness  EYES/ENT: No visual changes;  No throat pain   NECK: No pain or stiffness  RESPIRATORY: No cough, wheezing; No shortness of breath  CARDIOVASCULAR: No chest pain or palpitations  GASTROINTESTINAL: No abdominal  pain. No nausea, vomiting, or hematemesis  GENITOURINARY: No dysuria, frequency or hematuria  NEUROLOGICAL: No stroke like symptoms  SKIN: No rashes    TELEMETRY Personally reviewed: SR 65-75 PACs  	  MEDICATIONS:  buMETAnide 1 milliGRAM(s) Oral two times a day  metoprolol tartrate 100 milliGRAM(s) Oral every 12 hours  spironolactone 12.5 milliGRAM(s) Oral daily        PHYSICAL EXAM:  T(C): 36.6 (05-09-23 @ 04:47), Max: 36.6 (05-08-23 @ 20:06)  HR: 71 (05-09-23 @ 04:47) (66 - 73)  BP: 113/69 (05-09-23 @ 04:47) (101/64 - 135/80)  RR: 18 (05-09-23 @ 04:47) (18 - 18)  SpO2: 90% (05-09-23 @ 04:47) (90% - 97%)  Wt(kg): --  I&O's Summary    08 May 2023 07:01  -  09 May 2023 07:00  --------------------------------------------------------  IN: 1044 mL / OUT: 1900 mL / NET: -856 mL    09 May 2023 07:01  -  09 May 2023 10:57  --------------------------------------------------------  IN: 280 mL / OUT: 0 mL / NET: 280 mL          Appearance: In no distress	  HEENT:    PERRL, EOMI	  Cardiovascular:  S1 S2, No JVD  Respiratory: Lungs clear to auscultation	  Gastrointestinal:  Soft, Non-tender, + BS	  Vascularature:  No edema of LE  Psychiatric: Appropriate affect   Neuro: no acute focal deficits                               9.4    8.24  )-----------( 263      ( 09 May 2023 06:47 )             31.9     05-09    141  |  97  |  36<H>  ----------------------------<  239<H>  3.7   |  31  |  1.96<H>    Ca    9.5      09 May 2023 06:42  Phos  3.8     05-09  Mg     1.8     05-09    TPro  6.7  /  Alb  3.5  /  TBili  0.2  /  DBili  x   /  AST  12  /  ALT  16  /  AlkPhos  83  05-09        Labs personally reviewed      ASSESSMENT/PLAN: 	    Ms. Batista is a 84 YO woman with PMH of HTN, HLD, DM2, HFpEF (EF 65%), CAD s/p CABG, Breast CA s/p R partial mastectomy, rectal CA s/p resection with recent admission for acute rissa s/p ERCP w/ stent c/b gallbladder perf w/ perc rissa and recurrent SVT, presenting for shortness of breath. Denies CP, palpitations, orthopnea or syncope. Follows as OP with Dr. Hagan.    Problem/Plan -1  Problem: Acute on Chronic Diastolic CHF  - POCUS suggestive of pulm edema  - CXR with no pulm edema or effusion. Prior CT one month ago with moderate effusions and pulm edema  - proBNP 2505 --> 1536  - Prior TTE 3/23 shows preserved EF, hyperdynamic LV function, basal inferoseptum hypokinesis, decreased RV systolic function severe pulm pressures, B/L pleural effusions  - Repeat TTE 5/6 Normal left ventricular cavity size. The left ventricular wall thickness is normal. The left ventricular systolic function is normal, EF 62%. No RWA. Mod PH. PAS pressure 56 mmHg.  - Given elevated PAS and ongoing hypoxia recommend RHC in order to assess hemodynamics- planned for today 5/10 rescheduled 2/2 cath lab schedule)   - Strict I&Os, daily weights  - Reports SOB, still requires supplemental O2 intermittently   - Continue Bumex 1mg BID PO   - Initiated [place] 25mg daily     Problem/Plan -2  Problem: CAD   - s/p CABG 2020  - ECG with no ischemia noted  - Trop 21  - c/w ASA, metoprolol and statin  - Prior TTE 3/23 shows preserved EF, hyperdynamic LV function, basal inferoseptal hypokinesis, decreased RV systolic function severe pulm pressures   - 5/5 c/o CP/SOB: trop/CKMB/ECG wnl.     Problem/Plan -3  Problem: hx of Recurrent SVT  - c/w Metoprolol 100mg PO BID  - cont to monitor on tele    Problem/Plan -4  Problem: Atrial Fibrillation  - Patient with n/o A-flutter on previous admission  - c/w Metoprolol   - eliquis 2.5mg PO BID on hold   - c/w Heparin gtt    Problem/Plan- 5  Problem: Hx of Cholecystitis  - ERCP on hold pending improvement in respiratory function        Delaney Vasquez, AG-NP   Sergey Winchester DO [facility]  Cardiovascular Medicine  [address] [place] [address], [address]  Available through call or text on [employer]  Office: [phone]

## 2023-05-09 NOTE — PROGRESS NOTE ADULT - PROBLEM SELECTOR PLAN 4
Normocytic anemia - likely 2/2 CKD  - Iron studies consistent with MILLY - will start iron supplementation  - Per nephro, started on darbepoitin weekly  - Bowel regimen

## 2023-05-09 NOTE — PROGRESS NOTE ADULT - SUBJECTIVE AND OBJECTIVE BOX
NEPHROLOGY     Patient seen and examined resting comfortably, no complaints, on NC, in no acute distress.     MEDICATIONS  (STANDING):  albuterol/ipratropium for Nebulization 3 milliLiter(s) Nebulizer every 6 hours  aspirin  chewable 81 milliGRAM(s) Oral daily  atorvastatin 40 milliGRAM(s) Oral at bedtime  buMETAnide Injectable 1 milliGRAM(s) IV Push two times a day  chlorhexidine 2% Cloths 1 Application(s) Topical <User Schedule>  darbepoetin Injectable ViaL 40 MICROGram(s) SubCutaneous every 7 days  dextrose 5%. 1000 milliLiter(s) (100 mL/Hr) IV Continuous <Continuous>  dextrose 5%. 1000 milliLiter(s) (50 mL/Hr) IV Continuous <Continuous>  dextrose 50% Injectable 25 Gram(s) IV Push once  dextrose 50% Injectable 12.5 Gram(s) IV Push once  dextrose 50% Injectable 25 Gram(s) IV Push once  ferrous    sulfate 325 milliGRAM(s) Oral daily  gabapentin 200 milliGRAM(s) Oral daily  glucagon  Injectable 1 milliGRAM(s) IntraMuscular once  heparin  Infusion. 1200 Unit(s)/Hr (12 mL/Hr) IV Continuous <Continuous>  insulin glargine Injectable (LANTUS) 10 Unit(s) SubCutaneous at bedtime  insulin lispro (ADMELOG) corrective regimen sliding scale   SubCutaneous at bedtime  insulin lispro (ADMELOG) corrective regimen sliding scale   SubCutaneous three times a day before meals  insulin lispro Injectable (ADMELOG) 5 Unit(s) SubCutaneous three times a day before meals  metoprolol tartrate 100 milliGRAM(s) Oral every 12 hours  Nephro-ashley 1 Tablet(s) Oral daily  pantoprazole    Tablet 40 milliGRAM(s) Oral before breakfast  polyethylene glycol 3350 17 Gram(s) Oral daily  senna 2 Tablet(s) Oral at bedtime  sodium bicarbonate 650 milliGRAM(s) Oral daily  spironolactone 25 milliGRAM(s) Oral daily    VITALS:  T(C): , Max: 36.9 (05-06-23 @ 11:17)  T(F): , Max: 98.5 (05-06-23 @ 11:17)  HR: 76 (05-07-23 @ 04:52)  BP: 124/76 (05-07-23 @ 04:52)  RR: 18 (05-07-23 @ 04:52)  SpO2: 90% (05-07-23 @ 04:52)    I and O's:    05-06 @ 07:01  -  05-07 @ 07:00  --------------------------------------------------------  IN: 480 mL / OUT: 1000 mL / NET: -520 mL    PHYSICAL EXAM:  General: Alerted, oriented  HEENT: MMM  Lungs:CTA-b/l  Heart: RRR S1S2  Abdomen: Soft, NTND  Ext: + le edema  : no chne    LABS:                        9.2    10.04 )-----------( 265      ( 07 May 2023 06:49 )             31.7     05-07    139  |  96  |  33<H>  ----------------------------<  251<H>  4.1   |  31  |  1.91<H>    Ca    9.5      07 May 2023 06:49  Phos  3.6     05-07  Mg     1.8     05-07    TPro  7.0  /  Alb  3.6  /  TBili  0.4  /  DBili  x   /  AST  17  /  ALT  15  /  AlkPhos  90  05-07

## 2023-05-09 NOTE — PROGRESS NOTE ADULT - ASSESSMENT
ASSESSMENT:  Ms. Batista is a 82 YO woman with PMH of HTN, HLD, DM2, HFpEF (EF 65%), CAD s/p CABG, Breast CA s/p R partial mastectomy, rectal CA s/p resection with previous admission for acute rissa s/p ERCP w/ stent c/b gallbladder perf w/ perc rissa and recurrent SVT, and recent admission for CHF exacerbation presenting for shortness of breath.----out patient nephrologist Dr Kan .    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   cont spironolactone 12.5mg po daily ( anti ryan + diuretic )    a-e potassium supplements  keep k ~4 and mag 2  avoid nephrotoxic medication  dose all medications for GFR ~25 ml/min  maintain renal diet   monitor I/Os  cont nebs   2)CVS: continue BP meds as ordered for now  3)cont daily oral iron   4)weekly aranesp 40 mcg SQ today   5) GI--- volume status improved        [facility]  Office: [phone]

## 2023-05-09 NOTE — PROGRESS NOTE ADULT - SUBJECTIVE AND OBJECTIVE BOX
Sultan Sixto MD  PGY 1 Department of Internal Medicine        Patient is a 83y old  Female who presents with a chief complaint of Shortness of breath (08 May 2023 16:43)      SUBJECTIVE / OVERNIGHT EVENTS: Pt seen and examined. No acute overnight events. Denies fevers, chills, CP, SOB, Abdominal pain, N/V, Constipation, Diarrhea        MEDICATIONS  (STANDING):  albuterol/ipratropium for Nebulization 3 milliLiter(s) Nebulizer every 6 hours  aspirin  chewable 81 milliGRAM(s) Oral daily  atorvastatin 40 milliGRAM(s) Oral at bedtime  buMETAnide 1 milliGRAM(s) Oral two times a day  cefTRIAXone   IVPB 1000 milliGRAM(s) IV Intermittent every 24 hours  chlorhexidine 2% Cloths 1 Application(s) Topical <User Schedule>  darbepoetin Injectable ViaL 40 MICROGram(s) SubCutaneous every 7 days  dextrose 5%. 1000 milliLiter(s) (50 mL/Hr) IV Continuous <Continuous>  dextrose 5%. 1000 milliLiter(s) (100 mL/Hr) IV Continuous <Continuous>  dextrose 50% Injectable 25 Gram(s) IV Push once  dextrose 50% Injectable 12.5 Gram(s) IV Push once  dextrose 50% Injectable 25 Gram(s) IV Push once  ferrous    sulfate 325 milliGRAM(s) Oral daily  gabapentin 200 milliGRAM(s) Oral daily  glucagon  Injectable 1 milliGRAM(s) IntraMuscular once  heparin  Infusion. 1200 Unit(s)/Hr (12 mL/Hr) IV Continuous <Continuous>  insulin glargine Injectable (LANTUS) 10 Unit(s) SubCutaneous at bedtime  insulin lispro (ADMELOG) corrective regimen sliding scale   SubCutaneous three times a day before meals  insulin lispro (ADMELOG) corrective regimen sliding scale   SubCutaneous at bedtime  insulin lispro Injectable (ADMELOG) 5 Unit(s) SubCutaneous three times a day before meals  metoprolol tartrate 100 milliGRAM(s) Oral every 12 hours  Nephro-ashley 1 Tablet(s) Oral daily  pantoprazole    Tablet 40 milliGRAM(s) Oral before breakfast  phenazopyridine 100 milliGRAM(s) Oral every 8 hours  polyethylene glycol 3350 17 Gram(s) Oral daily  senna 2 Tablet(s) Oral at bedtime  sodium bicarbonate 650 milliGRAM(s) Oral daily  spironolactone 12.5 milliGRAM(s) Oral daily    MEDICATIONS  (PRN):  acetaminophen     Tablet .. 650 milliGRAM(s) Oral every 6 hours PRN Temp greater or equal to 38C (100.4F), Mild Pain (1 - 3)  dextrose Oral Gel 15 Gram(s) Oral once PRN Blood Glucose LESS THAN 70 milliGRAM(s)/deciliter  melatonin 3 milliGRAM(s) Oral at bedtime PRN Insomnia      I&O's Summary    07 May 2023 07:01  -  08 May 2023 07:00  --------------------------------------------------------  IN: 1024 mL / OUT: 1800 mL / NET: -776 mL    08 May 2023 07:01  -  09 May 2023 05:25  --------------------------------------------------------  IN: 1044 mL / OUT: 1600 mL / NET: -556 mL        Vital Signs Last 24 Hrs  T(C): 36.6 (09 May 2023 04:47), Max: 36.6 (08 May 2023 20:06)  T(F): 97.8 (09 May 2023 04:47), Max: 97.8 (08 May 2023 20:06)  HR: 71 (09 May 2023 04:47) (66 - 77)  BP: 113/69 (09 May 2023 04:47) (101/64 - 135/80)  BP(mean): --  RR: 18 (09 May 2023 04:47) (18 - 18)  SpO2: 90% (09 May 2023 04:47) (84% - 97%)    Parameters below as of 09 May 2023 04:47  Patient On (Oxygen Delivery Method): nasal cannula  O2 Flow (L/min): 2      CAPILLARY BLOOD GLUCOSE      POCT Blood Glucose.: 276 mg/dL (08 May 2023 21:10)  POCT Blood Glucose.: 240 mg/dL (08 May 2023 17:10)  POCT Blood Glucose.: 293 mg/dL (08 May 2023 11:28)  POCT Blood Glucose.: 239 mg/dL (08 May 2023 07:34)      PHYSICAL EXAM:  GENERAL: NAD,   HEAD:  Atraumatic, Normocephalic  EYES: EOMI, PERRL, conjunctiva and sclera clear  NECK: No JVD  CHEST/LUNG: Clear to auscultation bilaterally; No wheeze  HEART: Regular rate and rhythm; No murmurs, rubs, or gallops  ABDOMEN: Soft, Nontender, Nondistended; Bowel sounds present  EXTREMITIES:  2+ Peripheral Pulses, No clubbing, cyanosis, or edema  PSYCH: AAOx3  NEUROLOGY: non-focal  SKIN: No rashes or lesions       LABS:                        9.6    8.10  )-----------( 240      ( 08 May 2023 06:18 )             33.3     Auto Eosinophil # x     / Auto Eosinophil % x     / Auto Neutrophil # x     / Auto Neutrophil % x     / BANDS % x                            9.2    10.04 )-----------( 265      ( 07 May 2023 06:49 )             31.7     Auto Eosinophil # x     / Auto Eosinophil % x     / Auto Neutrophil # x     / Auto Neutrophil % x     / BANDS % x        05    141  |  96  |  32<H>  ----------------------------<  214<H>  4.0   |  33<H>  |  1.96<H>      139  |  96  |  33<H>  ----------------------------<  251<H>  4.1   |  31  |  1.91<H>    Ca    9.7      08 May 2023 06:20  Mg     1.8       Phos  3.4       TPro  7.0  /  Alb  3.6  /  TBili  0.4  /  DBili  x   /  AST  17  /  ALT  15  /  AlkPhos  90  05-07    PTT - ( 08 May 2023 06:18 )  PTT:65.6 sec      Urinalysis Basic - ( 08 May 2023 15:14 )    Color: Yellow / Appearance: Slightly Turbid / S.012 / pH: x  Gluc: x / Ketone: Negative  / Bili: Negative / Urobili: Negative   Blood: x / Protein: Trace / Nitrite: Negative   Leuk Esterase: Large / RBC: 6 /hpf /  /HPF   Sq Epi: x / Non Sq Epi: x / Bacteria: Many            RADIOLOGY & ADDITIONAL TESTS:    Imaging Personally Reviewed:    Consultant(s) Notes Reviewed:      Care Discussed with Consultants/Other Providers:

## 2023-05-10 LAB
ALBUMIN SERPL ELPH-MCNC: 3.5 G/DL — SIGNIFICANT CHANGE UP (ref 3.3–5)
ALP SERPL-CCNC: 84 U/L — SIGNIFICANT CHANGE UP (ref 40–120)
ALT FLD-CCNC: 14 U/L — SIGNIFICANT CHANGE UP (ref 10–45)
ANION GAP SERPL CALC-SCNC: 15 MMOL/L — SIGNIFICANT CHANGE UP (ref 5–17)
APTT BLD: 28.1 SEC — SIGNIFICANT CHANGE UP (ref 27.5–35.5)
APTT BLD: 47.4 SEC — HIGH (ref 27.5–35.5)
APTT BLD: 70.1 SEC — HIGH (ref 27.5–35.5)
AST SERPL-CCNC: 17 U/L — SIGNIFICANT CHANGE UP (ref 10–40)
BASOPHILS # BLD AUTO: 0.03 K/UL — SIGNIFICANT CHANGE UP (ref 0–0.2)
BASOPHILS NFR BLD AUTO: 0.3 % — SIGNIFICANT CHANGE UP (ref 0–2)
BILIRUB SERPL-MCNC: 0.2 MG/DL — SIGNIFICANT CHANGE UP (ref 0.2–1.2)
BUN SERPL-MCNC: 33 MG/DL — HIGH (ref 7–23)
CALCIUM SERPL-MCNC: 9.4 MG/DL — SIGNIFICANT CHANGE UP (ref 8.4–10.5)
CHLORIDE SERPL-SCNC: 95 MMOL/L — LOW (ref 96–108)
CO2 SERPL-SCNC: 28 MMOL/L — SIGNIFICANT CHANGE UP (ref 22–31)
CREAT SERPL-MCNC: 1.84 MG/DL — HIGH (ref 0.5–1.3)
EGFR: 27 ML/MIN/1.73M2 — LOW
EOSINOPHIL # BLD AUTO: 0.05 K/UL — SIGNIFICANT CHANGE UP (ref 0–0.5)
EOSINOPHIL NFR BLD AUTO: 0.5 % — SIGNIFICANT CHANGE UP (ref 0–6)
GLUCOSE BLDC GLUCOMTR-MCNC: 186 MG/DL — HIGH (ref 70–99)
GLUCOSE BLDC GLUCOMTR-MCNC: 253 MG/DL — HIGH (ref 70–99)
GLUCOSE BLDC GLUCOMTR-MCNC: 271 MG/DL — HIGH (ref 70–99)
GLUCOSE BLDC GLUCOMTR-MCNC: 290 MG/DL — HIGH (ref 70–99)
GLUCOSE SERPL-MCNC: 205 MG/DL — HIGH (ref 70–99)
HCT VFR BLD CALC: 32.8 % — LOW (ref 34.5–45)
HGB BLD-MCNC: 9.5 G/DL — LOW (ref 11.5–15.5)
HGB FLD-MCNC: 9.6 G/DL — LOW (ref 11.7–16.5)
IMM GRANULOCYTES NFR BLD AUTO: 0.4 % — SIGNIFICANT CHANGE UP (ref 0–0.9)
LYMPHOCYTES # BLD AUTO: 1.58 K/UL — SIGNIFICANT CHANGE UP (ref 1–3.3)
LYMPHOCYTES # BLD AUTO: 16.8 % — SIGNIFICANT CHANGE UP (ref 13–44)
MAGNESIUM SERPL-MCNC: 1.9 MG/DL — SIGNIFICANT CHANGE UP (ref 1.6–2.6)
MCHC RBC-ENTMCNC: 27.7 PG — SIGNIFICANT CHANGE UP (ref 27–34)
MCHC RBC-ENTMCNC: 29 GM/DL — LOW (ref 32–36)
MCV RBC AUTO: 95.6 FL — SIGNIFICANT CHANGE UP (ref 80–100)
MONOCYTES # BLD AUTO: 0.71 K/UL — SIGNIFICANT CHANGE UP (ref 0–0.9)
MONOCYTES NFR BLD AUTO: 7.5 % — SIGNIFICANT CHANGE UP (ref 2–14)
NEUTROPHILS # BLD AUTO: 7.01 K/UL — SIGNIFICANT CHANGE UP (ref 1.8–7.4)
NEUTROPHILS NFR BLD AUTO: 74.5 % — SIGNIFICANT CHANGE UP (ref 43–77)
NRBC # BLD: 0 /100 WBCS — SIGNIFICANT CHANGE UP (ref 0–0)
OXYHGB MFR BLDMV: 63.9 % — LOW (ref 90–95)
PHOSPHATE SERPL-MCNC: 3.8 MG/DL — SIGNIFICANT CHANGE UP (ref 2.5–4.5)
PLATELET # BLD AUTO: 291 K/UL — SIGNIFICANT CHANGE UP (ref 150–400)
POTASSIUM SERPL-MCNC: 4.3 MMOL/L — SIGNIFICANT CHANGE UP (ref 3.5–5.3)
POTASSIUM SERPL-SCNC: 4.3 MMOL/L — SIGNIFICANT CHANGE UP (ref 3.5–5.3)
PROT SERPL-MCNC: 7 G/DL — SIGNIFICANT CHANGE UP (ref 6–8.3)
RBC # BLD: 3.43 M/UL — LOW (ref 3.8–5.2)
RBC # FLD: 20 % — HIGH (ref 10.3–14.5)
SAO2 % BLD: 65.5 % — SIGNIFICANT CHANGE UP (ref 60–90)
SODIUM SERPL-SCNC: 138 MMOL/L — SIGNIFICANT CHANGE UP (ref 135–145)
WBC # BLD: 9.42 K/UL — SIGNIFICANT CHANGE UP (ref 3.8–10.5)
WBC # FLD AUTO: 9.42 K/UL — SIGNIFICANT CHANGE UP (ref 3.8–10.5)

## 2023-05-10 PROCEDURE — 99233 SBSQ HOSP IP/OBS HIGH 50: CPT | Mod: GC

## 2023-05-10 RX ORDER — HEPARIN SODIUM 5000 [USP'U]/ML
1200 INJECTION INTRAVENOUS; SUBCUTANEOUS
Qty: 25000 | Refills: 0 | Status: DISCONTINUED | OUTPATIENT
Start: 2023-05-11 | End: 2023-05-15

## 2023-05-10 RX ORDER — INSULIN GLARGINE 100 [IU]/ML
18 INJECTION, SOLUTION SUBCUTANEOUS AT BEDTIME
Refills: 0 | Status: DISCONTINUED | OUTPATIENT
Start: 2023-05-10 | End: 2023-05-17

## 2023-05-10 RX ORDER — INSULIN LISPRO 100/ML
7 VIAL (ML) SUBCUTANEOUS
Refills: 0 | Status: DISCONTINUED | OUTPATIENT
Start: 2023-05-10 | End: 2023-05-17

## 2023-05-10 RX ADMIN — CEFTRIAXONE 100 MILLIGRAM(S): 500 INJECTION, POWDER, FOR SOLUTION INTRAMUSCULAR; INTRAVENOUS at 15:31

## 2023-05-10 RX ADMIN — Medication 100 MILLIGRAM(S): at 13:54

## 2023-05-10 RX ADMIN — Medication 3: at 11:37

## 2023-05-10 RX ADMIN — Medication 3 MILLILITER(S): at 00:40

## 2023-05-10 RX ADMIN — CHLORHEXIDINE GLUCONATE 1 APPLICATION(S): 213 SOLUTION TOPICAL at 05:08

## 2023-05-10 RX ADMIN — Medication 3 MILLILITER(S): at 18:30

## 2023-05-10 RX ADMIN — Medication 40 MICROGRAM(S): at 15:31

## 2023-05-10 RX ADMIN — HEPARIN SODIUM 1400 UNIT(S)/HR: 5000 INJECTION INTRAVENOUS; SUBCUTANEOUS at 14:49

## 2023-05-10 RX ADMIN — Medication 6 UNIT(S): at 11:38

## 2023-05-10 RX ADMIN — Medication 81 MILLIGRAM(S): at 11:37

## 2023-05-10 RX ADMIN — SPIRONOLACTONE 12.5 MILLIGRAM(S): 25 TABLET, FILM COATED ORAL at 05:05

## 2023-05-10 RX ADMIN — Medication 100 MILLIGRAM(S): at 17:35

## 2023-05-10 RX ADMIN — Medication 325 MILLIGRAM(S): at 11:37

## 2023-05-10 RX ADMIN — Medication 100 MILLIGRAM(S): at 05:05

## 2023-05-10 RX ADMIN — Medication 1 TABLET(S): at 11:37

## 2023-05-10 RX ADMIN — ATORVASTATIN CALCIUM 40 MILLIGRAM(S): 80 TABLET, FILM COATED ORAL at 21:25

## 2023-05-10 RX ADMIN — Medication 3: at 17:36

## 2023-05-10 RX ADMIN — SENNA PLUS 2 TABLET(S): 8.6 TABLET ORAL at 21:25

## 2023-05-10 RX ADMIN — GABAPENTIN 200 MILLIGRAM(S): 400 CAPSULE ORAL at 11:37

## 2023-05-10 RX ADMIN — HEPARIN SODIUM 1400 UNIT(S)/HR: 5000 INJECTION INTRAVENOUS; SUBCUTANEOUS at 08:16

## 2023-05-10 RX ADMIN — Medication 3: at 08:15

## 2023-05-10 RX ADMIN — Medication 6 UNIT(S): at 08:15

## 2023-05-10 RX ADMIN — BUMETANIDE 1 MILLIGRAM(S): 0.25 INJECTION INTRAMUSCULAR; INTRAVENOUS at 05:05

## 2023-05-10 RX ADMIN — Medication 3 MILLILITER(S): at 05:08

## 2023-05-10 RX ADMIN — PANTOPRAZOLE SODIUM 40 MILLIGRAM(S): 20 TABLET, DELAYED RELEASE ORAL at 05:05

## 2023-05-10 RX ADMIN — INSULIN GLARGINE 18 UNIT(S): 100 INJECTION, SOLUTION SUBCUTANEOUS at 21:25

## 2023-05-10 RX ADMIN — BUMETANIDE 1 MILLIGRAM(S): 0.25 INJECTION INTRAMUSCULAR; INTRAVENOUS at 13:54

## 2023-05-10 RX ADMIN — Medication 100 MILLIGRAM(S): at 21:25

## 2023-05-10 RX ADMIN — Medication 3 MILLILITER(S): at 11:38

## 2023-05-10 NOTE — PROGRESS NOTE ADULT - PROBLEM SELECTOR PLAN 2
- Currently on 2L NC  - goal sat 92-95%  - Not on home O2  - Duonebs q6 for wheezes  - Incentive spirometry  - will have pulm evaluate patient given mod PH as a possible etiology of her hypoxia -> potential RHC 5/9  - might require home O2 setup

## 2023-05-10 NOTE — PROGRESS NOTE ADULT - PROBLEM SELECTOR PLAN 1
ProBNP 2505, TTE 3/23 with EF 76%, severe pulmonary hypertension, moderate diastolic dysfunction. POCUS done in ED cw pulmonary edema  - CT chest 5/1 with unchanged pulmonary edema/effusions  - Per cardiology transition bumex 1mg IV BID to bumex 1 mg PO BID on 5/8  - c/w aldactone 12.5mg daily   - Strict I&Os (net negative past 24 hours)  - Daily weights (neg 4 kg since admission)  - cardiology stating pt is now euvolemic; rec pulm eval -> both services recommending RHC, potentially today 5/9 ProBNP 2505, TTE 3/23 with EF 76%, severe pulmonary hypertension, moderate diastolic dysfunction. POCUS done in ED cw pulmonary edema  - CT chest 5/1 with unchanged pulmonary edema/effusions  - Per cardiology transition bumex 1mg IV BID to bumex 1 mg PO BID on 5/8  - c/w aldactone 12.5mg daily   - Strict I&Os (net negative past 24 hours)  - Daily weights (neg 4 kg since admission)  - cardiology stating pt is now euvolemic; rec pulm eval -> both services recommending RHC, planned for today 5/10

## 2023-05-10 NOTE — PROGRESS NOTE ADULT - ATTENDING COMMENTS
83 year old Welsh speaking female with PMH HTN, HLD, DM2, heart failure with preserved EF, CAD S/P CABG, breast CA and rectal CA who presented with shortness of breath due to CHF exacerbation. She was initially diuresed with IV Bumex but has been transitioned to PO per cardiology recs. She was on Bipap but has been weaned to 2L nasal cannula. Despite diuresis, the patient is unable to be weaned off of oxygen. Pulmonary consult was placed and it was recommended that the patient undergo a RHC which is scheduled for later today. The patient had choledocholithiasis s/p plastic biliary stent 1/20 and was due for repeat ERCP with stent exhange 4/26 as an outpatient however, she was admitted to the hospital. The ERCP can be done as an outpatient but the patient and family preference is to have it done while inpatient. The patient will need to be optimized from a cardiopulmonary standpoint prior to the procedure. If she cannot be weaned off of oxygen, will discuss doing the procedure while she is on 2L vs sending her home and having the procedure done as an outpatient. Her blood glucose levels have been elevated, will increase insulin. Rest of plan as above.

## 2023-05-10 NOTE — PROGRESS NOTE ADULT - SUBJECTIVE AND OBJECTIVE BOX
DATE OF SERVICE: 05-10-23 @ 09:51    Patient is a 83y old  Female who presents with a chief complaint of Shortness of breath (10 May 2023 05:27)      INTERVAL HISTORY: Denies SOB, although still requiring supplemental O2. Sitting at bedside chair, appears comfortable    REVIEW OF SYSTEMS:  CONSTITUTIONAL: No weakness  EYES/ENT: No visual changes;  No throat pain   NECK: No pain or stiffness  RESPIRATORY: No cough, wheezing; No shortness of breath  CARDIOVASCULAR: No chest pain or palpitations  GASTROINTESTINAL: No abdominal  pain. No nausea, vomiting, or hematemesis  GENITOURINARY: No dysuria, frequency or hematuria  NEUROLOGICAL: No stroke like symptoms  SKIN: No rashes    TELEMETRY Personally reviewed:  60-90  	  MEDICATIONS:  buMETAnide 1 milliGRAM(s) Oral two times a day  metoprolol tartrate 100 milliGRAM(s) Oral every 12 hours  spironolactone 12.5 milliGRAM(s) Oral daily        PHYSICAL EXAM:  T(C): 36.9 (05-10-23 @ 04:25), Max: 36.9 (05-10-23 @ 04:25)  HR: 75 (05-10-23 @ 04:25) (67 - 75)  BP: 129/69 (05-10-23 @ 04:25) (112/67 - 132/69)  RR: 18 (05-10-23 @ 04:25) (18 - 18)  SpO2: 100% (05-10-23 @ 04:25) (97% - 100%)  Wt(kg): --  I&O's Summary    09 May 2023 07:01  -  10 May 2023 07:00  --------------------------------------------------------  IN: 704 mL / OUT: 1400 mL / NET: -696 mL    10 May 2023 07:01  -  10 May 2023 09:51  --------------------------------------------------------  IN: 310 mL / OUT: 0 mL / NET: 310 mL          Appearance: In no distress	  HEENT:    PERRL, EOMI	  Cardiovascular:  S1 S2, No JVD  Respiratory: Lungs clear to auscultation	  Gastrointestinal:  Soft, Non-tender, + BS	  Vascularature:  No edema of LE  Psychiatric: Appropriate affect   Neuro: no acute focal deficits                               9.5    9.42  )-----------( 291      ( 10 May 2023 06:27 )             32.8     05-10    138  |  95<L>  |  33<H>  ----------------------------<  205<H>  4.3   |  28  |  1.84<H>    Ca    9.4      10 May 2023 06:31  Phos  3.8     05-10  Mg     1.9     05-10    TPro  7.0  /  Alb  3.5  /  TBili  0.2  /  DBili  x   /  AST  17  /  ALT  14  /  AlkPhos  84  05-10        Labs personally reviewed      ASSESSMENT/PLAN: 	  Ms. Batista is a 84 YO woman with PMH of HTN, HLD, DM2, HFpEF (EF 65%), CAD s/p CABG, Breast CA s/p R partial mastectomy, rectal CA s/p resection with recent admission for acute rissa s/p ERCP w/ stent c/b gallbladder perf w/ perc rissa and recurrent SVT, presenting for shortness of breath. Denies CP, palpitations, orthopnea or syncope. Follows as OP with Dr. Hagan.    Problem/Plan -1  Problem: Acute on Chronic Diastolic CHF  - POCUS suggestive of pulm edema  - CXR with no pulm edema or effusion. Prior CT one month ago with moderate effusions and pulm edema  - proBNP 2505 --> 1536  - Prior TTE 3/23 shows preserved EF, hyperdynamic LV function, basal inferoseptum hypokinesis, decreased RV systolic function severe pulm pressures, B/L pleural effusions  - Repeat TTE 5/6 Normal left ventricular cavity size. The left ventricular wall thickness is normal. The left ventricular systolic function is normal, EF 62%. No RWA. Mod PH. PAS pressure 56 mmHg.  - Given elevated PAS and ongoing hypoxia recommend RHC in order to assess hemodynamics- planned for today 5/10 rescheduled 2/2 cath lab schedule)   - Strict I&Os, daily weights  - Reports SOB, still requires supplemental O2 intermittently   - Continue Bumex 1mg BID PO   - Initiated Good 25mg daily     Problem/Plan -2  Problem: CAD   - s/p CABG 2020  - ECG with no ischemia noted  - Trop 21  - c/w ASA, metoprolol and statin  - Prior TTE 3/23 shows preserved EF, hyperdynamic LV function, basal inferoseptal hypokinesis, decreased RV systolic function severe pulm pressures   - 5/5 c/o CP/SOB: trop/CKMB/ECG wnl.     Problem/Plan -3  Problem: hx of Recurrent SVT  - c/w Metoprolol 100mg PO BID  - cont to monitor on tele    Problem/Plan -4  Problem: Atrial Fibrillation  - Patient with n/o A-flutter on previous admission  - c/w Metoprolol   - eliquis 2.5mg PO BID on hold   - c/w Heparin gtt    Problem/Plan- 5  Problem: Hx of Cholecystitis  - ERCP on hold pending improvement in respiratory function          NARGIS Smith, DO [facility]  Cardiovascular Medicine  [address] [place] [address], [address]  Available through call or text on [employer]  Office: [phone]

## 2023-05-10 NOTE — PROGRESS NOTE ADULT - SUBJECTIVE AND OBJECTIVE BOX
Sultan Sixto MD  PGY 1 Department of Internal Medicine        Patient is a 83y old  Female who presents with a chief complaint of Shortness of breath (09 May 2023 10:56)      SUBJECTIVE / OVERNIGHT EVENTS: Pt seen and examined. No acute overnight events. Denies fevers, chills, CP, SOB, Abdominal pain, N/V, Constipation, Diarrhea        MEDICATIONS  (STANDING):  albuterol/ipratropium for Nebulization 3 milliLiter(s) Nebulizer every 6 hours  aspirin  chewable 81 milliGRAM(s) Oral daily  atorvastatin 40 milliGRAM(s) Oral at bedtime  buMETAnide 1 milliGRAM(s) Oral two times a day  cefTRIAXone   IVPB 1000 milliGRAM(s) IV Intermittent every 24 hours  chlorhexidine 2% Cloths 1 Application(s) Topical <User Schedule>  darbepoetin Injectable ViaL 40 MICROGram(s) SubCutaneous every 7 days  dextrose 5%. 1000 milliLiter(s) (100 mL/Hr) IV Continuous <Continuous>  dextrose 5%. 1000 milliLiter(s) (50 mL/Hr) IV Continuous <Continuous>  dextrose 50% Injectable 25 Gram(s) IV Push once  dextrose 50% Injectable 12.5 Gram(s) IV Push once  dextrose 50% Injectable 25 Gram(s) IV Push once  ferrous    sulfate 325 milliGRAM(s) Oral daily  gabapentin 200 milliGRAM(s) Oral daily  glucagon  Injectable 1 milliGRAM(s) IntraMuscular once  heparin  Infusion. 1200 Unit(s)/Hr (12 mL/Hr) IV Continuous <Continuous>  insulin glargine Injectable (LANTUS) 14 Unit(s) SubCutaneous at bedtime  insulin lispro (ADMELOG) corrective regimen sliding scale   SubCutaneous three times a day before meals  insulin lispro (ADMELOG) corrective regimen sliding scale   SubCutaneous at bedtime  insulin lispro Injectable (ADMELOG) 6 Unit(s) SubCutaneous three times a day with meals  metoprolol tartrate 100 milliGRAM(s) Oral every 12 hours  Nephro-ashley 1 Tablet(s) Oral daily  pantoprazole    Tablet 40 milliGRAM(s) Oral before breakfast  phenazopyridine 100 milliGRAM(s) Oral every 8 hours  polyethylene glycol 3350 17 Gram(s) Oral daily  senna 2 Tablet(s) Oral at bedtime  spironolactone 12.5 milliGRAM(s) Oral daily    MEDICATIONS  (PRN):  acetaminophen     Tablet .. 650 milliGRAM(s) Oral every 6 hours PRN Temp greater or equal to 38C (100.4F), Mild Pain (1 - 3)  dextrose Oral Gel 15 Gram(s) Oral once PRN Blood Glucose LESS THAN 70 milliGRAM(s)/deciliter  melatonin 3 milliGRAM(s) Oral at bedtime PRN Insomnia      I&O's Summary    08 May 2023 07:01  -  09 May 2023 07:00  --------------------------------------------------------  IN: 1044 mL / OUT: 1900 mL / NET: -856 mL    09 May 2023 07:01  -  10 May 2023 05:27  --------------------------------------------------------  IN: 704 mL / OUT: 800 mL / NET: -96 mL        Vital Signs Last 24 Hrs  T(C): 36.9 (10 May 2023 04:25), Max: 36.9 (10 May 2023 04:25)  T(F): 98.4 (10 May 2023 04:25), Max: 98.4 (10 May 2023 04:25)  HR: 75 (10 May 2023 04:25) (67 - 75)  BP: 129/69 (10 May 2023 04:25) (112/67 - 132/69)  BP(mean): --  RR: 18 (10 May 2023 04:25) (18 - 18)  SpO2: 100% (10 May 2023 04:25) (97% - 100%)    Parameters below as of 10 May 2023 04:25  Patient On (Oxygen Delivery Method): nasal cannula  O2 Flow (L/min): 2      CAPILLARY BLOOD GLUCOSE      POCT Blood Glucose.: 270 mg/dL (09 May 2023 21:13)  POCT Blood Glucose.: 220 mg/dL (09 May 2023 16:58)  POCT Blood Glucose.: 271 mg/dL (09 May 2023 11:35)  POCT Blood Glucose.: 216 mg/dL (09 May 2023 07:25)      PHYSICAL EXAM:  GENERAL: Sitting in bed in no acute distress  EYES: Conjunctiva noninjected or pale, sclera anicteric  HENT: NC/AT, moist mucous membranes  NECK: Supple, trachea midline.  LUNG: Mild inspiratory crackles b/l. On 2L nasal cannula  CV: RRR, Pulses- Radial: 2+ b/l.   ABDOMEN: Nondistended, nontender  MSK: No visible deformities, nontender extremities. R arm with chronic lymphedema, mild pitting edema in LEs b/l.   SKIN: No rashes, bruises  NEURO: AAOx4 (to person, place, time, event), no tremor     LABS:                        9.4    8.24  )-----------( 263      ( 09 May 2023 06:47 )             31.9     Auto Eosinophil # 0.12  / Auto Eosinophil % 1.5   / Auto Neutrophil # 5.90  / Auto Neutrophil % 71.5  / BANDS % x                            9.6    8.10  )-----------( 240      ( 08 May 2023 06:18 )             33.3     Auto Eosinophil # x     / Auto Eosinophil % x     / Auto Neutrophil # x     / Auto Neutrophil % x     / BANDS % x            141  |  97  |  36<H>  ----------------------------<  239<H>  3.7   |  31  |  1.96<H>      141  |  96  |  32<H>  ----------------------------<  214<H>  4.0   |  33<H>  |  1.96<H>    Ca    9.5      09 May 2023 06:42  Mg     1.8       Phos  3.8       TPro  6.7  /  Alb  3.5  /  TBili  0.2  /  DBili  x   /  AST  12  /  ALT  16  /  AlkPhos  83      PTT - ( 09 May 2023 06:42 )  PTT:63.9 sec      Urinalysis Basic - ( 08 May 2023 15:14 )    Color: Yellow / Appearance: Slightly Turbid / S.012 / pH: x  Gluc: x / Ketone: Negative  / Bili: Negative / Urobili: Negative   Blood: x / Protein: Trace / Nitrite: Negative   Leuk Esterase: Large / RBC: 6 /hpf /  /HPF   Sq Epi: x / Non Sq Epi: x / Bacteria: Many            RADIOLOGY & ADDITIONAL TESTS:    Imaging Personally Reviewed:    Consultant(s) Notes Reviewed:      Care Discussed with Consultants/Other Providers:   Sultan Sixto MD  PGY 1 Department of Internal Medicine        Patient is a 83y old  Female who presents with a chief complaint of Shortness of breath (09 May 2023 10:56)      SUBJECTIVE / OVERNIGHT EVENTS: Pt seen and examined. No acute overnight events. Denies fevers, chills, CP, SOB, Abdominal pain, N/V, Constipation, Diarrhea. Tele w/o events. Patient feels stable.         MEDICATIONS  (STANDING):  albuterol/ipratropium for Nebulization 3 milliLiter(s) Nebulizer every 6 hours  aspirin  chewable 81 milliGRAM(s) Oral daily  atorvastatin 40 milliGRAM(s) Oral at bedtime  buMETAnide 1 milliGRAM(s) Oral two times a day  cefTRIAXone   IVPB 1000 milliGRAM(s) IV Intermittent every 24 hours  chlorhexidine 2% Cloths 1 Application(s) Topical <User Schedule>  darbepoetin Injectable ViaL 40 MICROGram(s) SubCutaneous every 7 days  dextrose 5%. 1000 milliLiter(s) (100 mL/Hr) IV Continuous <Continuous>  dextrose 5%. 1000 milliLiter(s) (50 mL/Hr) IV Continuous <Continuous>  dextrose 50% Injectable 25 Gram(s) IV Push once  dextrose 50% Injectable 12.5 Gram(s) IV Push once  dextrose 50% Injectable 25 Gram(s) IV Push once  ferrous    sulfate 325 milliGRAM(s) Oral daily  gabapentin 200 milliGRAM(s) Oral daily  glucagon  Injectable 1 milliGRAM(s) IntraMuscular once  heparin  Infusion. 1200 Unit(s)/Hr (12 mL/Hr) IV Continuous <Continuous>  insulin glargine Injectable (LANTUS) 14 Unit(s) SubCutaneous at bedtime  insulin lispro (ADMELOG) corrective regimen sliding scale   SubCutaneous three times a day before meals  insulin lispro (ADMELOG) corrective regimen sliding scale   SubCutaneous at bedtime  insulin lispro Injectable (ADMELOG) 6 Unit(s) SubCutaneous three times a day with meals  metoprolol tartrate 100 milliGRAM(s) Oral every 12 hours  Nephro-ashley 1 Tablet(s) Oral daily  pantoprazole    Tablet 40 milliGRAM(s) Oral before breakfast  phenazopyridine 100 milliGRAM(s) Oral every 8 hours  polyethylene glycol 3350 17 Gram(s) Oral daily  senna 2 Tablet(s) Oral at bedtime  spironolactone 12.5 milliGRAM(s) Oral daily    MEDICATIONS  (PRN):  acetaminophen     Tablet .. 650 milliGRAM(s) Oral every 6 hours PRN Temp greater or equal to 38C (100.4F), Mild Pain (1 - 3)  dextrose Oral Gel 15 Gram(s) Oral once PRN Blood Glucose LESS THAN 70 milliGRAM(s)/deciliter  melatonin 3 milliGRAM(s) Oral at bedtime PRN Insomnia      I&O's Summary    08 May 2023 07:01  -  09 May 2023 07:00  --------------------------------------------------------  IN: 1044 mL / OUT: 1900 mL / NET: -856 mL    09 May 2023 07:01  -  10 May 2023 05:27  --------------------------------------------------------  IN: 704 mL / OUT: 800 mL / NET: -96 mL        Vital Signs Last 24 Hrs  T(C): 36.9 (10 May 2023 04:25), Max: 36.9 (10 May 2023 04:25)  T(F): 98.4 (10 May 2023 04:25), Max: 98.4 (10 May 2023 04:25)  HR: 75 (10 May 2023 04:25) (67 - 75)  BP: 129/69 (10 May 2023 04:25) (112/67 - 132/69)  BP(mean): --  RR: 18 (10 May 2023 04:25) (18 - 18)  SpO2: 100% (10 May 2023 04:25) (97% - 100%)    Parameters below as of 10 May 2023 04:25  Patient On (Oxygen Delivery Method): nasal cannula  O2 Flow (L/min): 2      CAPILLARY BLOOD GLUCOSE      POCT Blood Glucose.: 270 mg/dL (09 May 2023 21:13)  POCT Blood Glucose.: 220 mg/dL (09 May 2023 16:58)  POCT Blood Glucose.: 271 mg/dL (09 May 2023 11:35)  POCT Blood Glucose.: 216 mg/dL (09 May 2023 07:25)      PHYSICAL EXAM:  GENERAL: Sitting in bed in no acute distress  EYES: Conjunctiva noninjected or pale, sclera anicteric  HENT: NC/AT, moist mucous membranes  NECK: Supple, trachea midline.  LUNG: Mild inspiratory crackles b/l. On 2L nasal cannula  CV: RRR, Pulses- Radial: 2+ b/l.   ABDOMEN: Nondistended, nontender  MSK: No visible deformities, nontender extremities. R arm with chronic lymphedema, mild pitting edema in LEs b/l, improved  SKIN: No rashes, bruises  NEURO: AAOx4 (to person, place, time, event), no tremor     LABS:                        9.4    8.24  )-----------( 263      ( 09 May 2023 06:47 )             31.9     Auto Eosinophil # 0.12  / Auto Eosinophil % 1.5   / Auto Neutrophil # 5.90  / Auto Neutrophil % 71.5  / BANDS % x                            9.6    8.10  )-----------( 240      ( 08 May 2023 06:18 )             33.3     Auto Eosinophil # x     / Auto Eosinophil % x     / Auto Neutrophil # x     / Auto Neutrophil % x     / BANDS % x            141  |  97  |  36<H>  ----------------------------<  239<H>  3.7   |  31  |  1.96<H>      141  |  96  |  32<H>  ----------------------------<  214<H>  4.0   |  33<H>  |  1.96<H>    Ca    9.5      09 May 2023 06:42  Mg     1.8       Phos  3.8       TPro  6.7  /  Alb  3.5  /  TBili  0.2  /  DBili  x   /  AST  12  /  ALT  16  /  AlkPhos  83      PTT - ( 09 May 2023 06:42 )  PTT:63.9 sec      Urinalysis Basic - ( 08 May 2023 15:14 )    Color: Yellow / Appearance: Slightly Turbid / S.012 / pH: x  Gluc: x / Ketone: Negative  / Bili: Negative / Urobili: Negative   Blood: x / Protein: Trace / Nitrite: Negative   Leuk Esterase: Large / RBC: 6 /hpf /  /HPF   Sq Epi: x / Non Sq Epi: x / Bacteria: Many            RADIOLOGY & ADDITIONAL TESTS:    Imaging Personally Reviewed:    Consultant(s) Notes Reviewed:      Care Discussed with Consultants/Other Providers:

## 2023-05-10 NOTE — PROGRESS NOTE ADULT - ASSESSMENT
ASSESSMENT:  Ms. Batista is a 82 YO woman with PMH of HTN, HLD, DM2, HFpEF (EF 65%), CAD s/p CABG, Breast CA s/p R partial mastectomy, rectal CA s/p resection with previous admission for acute rissa s/p ERCP w/ stent c/b gallbladder perf w/ perc rissa and recurrent SVT, and recent admission for CHF exacerbation presenting for shortness of breath.----out patient nephrologist Dr Kan .    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,  nephrology aware , previous diuretic test showed minimal  function of that kidney)  CVS- BP controlled at present  hypervolemia - improving  Electrolytes - controlled  Anemia- -ckd related  also consistent  iron deficiency , tsat 7, ferritin 28---sp iv venofer  5 doses completed on 4/3023   GI--planning for ERCP--- volume status  improved   met alkalosis --- in the view of diuretic --- cont to monitor   a fib --on heparin   CAD;; EF 62  %  , HRC -- 5/10/23 ( volume status )  RECOMMENDATIONS  1)Renal: renal stable   bumex as ordered --1 mg PO x  bid   cont spironolactone 12.5mg po daily ( anti ryan + diuretic )    keep k ~4 and mag 2  dose all medications for GFR ~25 ml/min  monitor I/Os and daily weights   2)CVS: continue BP meds as ordered for now , heparin gtt, HRC today   3)Heme: cont daily oral iron .weekly aranesp 40 mcg SQ today   4) Pul:   daily nebs   5) GI--- ERCP on hold due to volume status         [facility]  Office: [phone]

## 2023-05-10 NOTE — PROGRESS NOTE ADULT - PROBLEM SELECTOR PLAN 6
A1c 7.8  Home basaglar 21/novolog 7 TID AC  - on 10 Lantus and 5 lispro tid; consider inc to 14 + 6 tid A1c 7.8  Home basaglar 21/novolog 7 TID AC  - Lantus 18 + lispro 7

## 2023-05-10 NOTE — CHART NOTE - NSCHARTNOTEFT_GEN_A_CORE
Spoke to patient's daughter Shauna Jennings over the phone as cath lab requesting temporary rescinding of DNR order for RHC planned for this AM. She noted that she would like for the DNR order to be temporarily rescinded while patient undergoes RHC and that DNR be reinstated afterwards. Form signed and placed in chart.

## 2023-05-10 NOTE — PROGRESS NOTE ADULT - SUBJECTIVE AND OBJECTIVE BOX
NEPHROLOGY     no event over night     MEDICATIONS  (STANDING):  albuterol/ipratropium for Nebulization 3 milliLiter(s) Nebulizer every 6 hours  aspirin  chewable 81 milliGRAM(s) Oral daily  atorvastatin 40 milliGRAM(s) Oral at bedtime  buMETAnide Injectable 1 milliGRAM(s) IV Push two times a day  chlorhexidine 2% Cloths 1 Application(s) Topical <User Schedule>  darbepoetin Injectable ViaL 40 MICROGram(s) SubCutaneous every 7 days  dextrose 5%. 1000 milliLiter(s) (100 mL/Hr) IV Continuous <Continuous>  dextrose 5%. 1000 milliLiter(s) (50 mL/Hr) IV Continuous <Continuous>  dextrose 50% Injectable 25 Gram(s) IV Push once  dextrose 50% Injectable 12.5 Gram(s) IV Push once  dextrose 50% Injectable 25 Gram(s) IV Push once  ferrous    sulfate 325 milliGRAM(s) Oral daily  gabapentin 200 milliGRAM(s) Oral daily  glucagon  Injectable 1 milliGRAM(s) IntraMuscular once  heparin  Infusion. 1200 Unit(s)/Hr (12 mL/Hr) IV Continuous <Continuous>  insulin glargine Injectable (LANTUS) 10 Unit(s) SubCutaneous at bedtime  insulin lispro (ADMELOG) corrective regimen sliding scale   SubCutaneous at bedtime  insulin lispro (ADMELOG) corrective regimen sliding scale   SubCutaneous three times a day before meals  insulin lispro Injectable (ADMELOG) 5 Unit(s) SubCutaneous three times a day before meals  metoprolol tartrate 100 milliGRAM(s) Oral every 12 hours  Nephro-ashley 1 Tablet(s) Oral daily  pantoprazole    Tablet 40 milliGRAM(s) Oral before breakfast  polyethylene glycol 3350 17 Gram(s) Oral daily  senna 2 Tablet(s) Oral at bedtime  sodium bicarbonate 650 milliGRAM(s) Oral daily  spironolactone 25 milliGRAM(s) Oral daily    VITALS:  T(C): , Max: 36.9 (05-06-23 @ 11:17)  T(F): , Max: 98.5 (05-06-23 @ 11:17)  HR: 76 (05-07-23 @ 04:52)  BP: 124/76 (05-07-23 @ 04:52)  RR: 18 (05-07-23 @ 04:52)  SpO2: 90% (05-07-23 @ 04:52)    I and O's:    05-06 @ 07:01  -  05-07 @ 07:00  --------------------------------------------------------  IN: 480 mL / OUT: 1000 mL / NET: -520 mL    PHYSICAL EXAM:  General: Alerted, oriented  HEENT: MMM  Lungs:CTA-b/l  Heart: RRR S1S2  Abdomen: Soft, NTND  Ext: + le edema  : no [place]    LABS:                        9.2    10.04 )-----------( 265      ( 07 May 2023 06:49 )             31.7     05-07    139  |  96  |  33<H>  ----------------------------<  251<H>  4.1   |  31  |  1.91<H>    Ca    9.5      07 May 2023 06:49  Phos  3.6     05-07  Mg     1.8     05-07    TPro  7.0  /  Alb  3.6  /  TBili  0.4  /  DBili  x   /  AST  17  /  ALT  15  /  AlkPhos  90  05-07     NEPHROLOGY     no event over night   RHC today   MEDICATIONS  (STANDING):  albuterol/ipratropium for Nebulization 3 milliLiter(s) Nebulizer every 6 hours  aspirin  chewable 81 milliGRAM(s) Oral daily  atorvastatin 40 milliGRAM(s) Oral at bedtime  buMETAnide Injectable 1 milliGRAM(s) IV Push two times a day  chlorhexidine 2% Cloths 1 Application(s) Topical <User Schedule>  darbepoetin Injectable ViaL 40 MICROGram(s) SubCutaneous every 7 days  dextrose 5%. 1000 milliLiter(s) (100 mL/Hr) IV Continuous <Continuous>  dextrose 5%. 1000 milliLiter(s) (50 mL/Hr) IV Continuous <Continuous>  dextrose 50% Injectable 25 Gram(s) IV Push once  dextrose 50% Injectable 12.5 Gram(s) IV Push once  dextrose 50% Injectable 25 Gram(s) IV Push once  ferrous    sulfate 325 milliGRAM(s) Oral daily  gabapentin 200 milliGRAM(s) Oral daily  glucagon  Injectable 1 milliGRAM(s) IntraMuscular once  heparin  Infusion. 1200 Unit(s)/Hr (12 mL/Hr) IV Continuous <Continuous>  insulin glargine Injectable (LANTUS) 10 Unit(s) SubCutaneous at bedtime  insulin lispro (ADMELOG) corrective regimen sliding scale   SubCutaneous at bedtime  insulin lispro (ADMELOG) corrective regimen sliding scale   SubCutaneous three times a day before meals  insulin lispro Injectable (ADMELOG) 5 Unit(s) SubCutaneous three times a day before meals  metoprolol tartrate 100 milliGRAM(s) Oral every 12 hours  Nephro-ashley 1 Tablet(s) Oral daily  pantoprazole    Tablet 40 milliGRAM(s) Oral before breakfast  polyethylene glycol 3350 17 Gram(s) Oral daily  senna 2 Tablet(s) Oral at bedtime  sodium bicarbonate 650 milliGRAM(s) Oral daily  spironolactone 25 milliGRAM(s) Oral daily    VITALS:  T(C): , Max: 36.9 (05-06-23 @ 11:17)  T(F): , Max: 98.5 (05-06-23 @ 11:17)  HR: 76 (05-07-23 @ 04:52)  BP: 124/76 (05-07-23 @ 04:52)  RR: 18 (05-07-23 @ 04:52)  SpO2: 90% (05-07-23 @ 04:52)    I and O's:    05-06 @ 07:01  -  05-07 @ 07:00  --------------------------------------------------------  IN: 480 mL / OUT: 1000 mL / NET: -520 mL    PHYSICAL EXAM:  General: Alerted, oriented  HEENT: MMM  Lungs:CTA-b/l  Heart: RRR S1S2  Abdomen: Soft, NTND  Ext: + le edema  : no [place]    LABS:                        9.2    10.04 )-----------( 265      ( 07 May 2023 06:49 )             31.7     05-07    139  |  96  |  33<H>  ----------------------------<  251<H>  4.1   |  31  |  1.91<H>    Ca    9.5      07 May 2023 06:49  Phos  3.6     05-07  Mg     1.8     05-07    TPro  7.0  /  Alb  3.6  /  TBili  0.4  /  DBili  x   /  AST  17  /  ALT  15  /  AlkPhos  90  05-07

## 2023-05-11 LAB
ALBUMIN SERPL ELPH-MCNC: 3.7 G/DL — SIGNIFICANT CHANGE UP (ref 3.3–5)
ALP SERPL-CCNC: 87 U/L — SIGNIFICANT CHANGE UP (ref 40–120)
ALT FLD-CCNC: 17 U/L — SIGNIFICANT CHANGE UP (ref 10–45)
ANION GAP SERPL CALC-SCNC: 11 MMOL/L — SIGNIFICANT CHANGE UP (ref 5–17)
APTT BLD: 63.4 SEC — HIGH (ref 27.5–35.5)
APTT BLD: 71 SEC — HIGH (ref 27.5–35.5)
AST SERPL-CCNC: 14 U/L — SIGNIFICANT CHANGE UP (ref 10–40)
BASOPHILS # BLD AUTO: 0.02 K/UL — SIGNIFICANT CHANGE UP (ref 0–0.2)
BASOPHILS NFR BLD AUTO: 0.3 % — SIGNIFICANT CHANGE UP (ref 0–2)
BILIRUB SERPL-MCNC: 0.2 MG/DL — SIGNIFICANT CHANGE UP (ref 0.2–1.2)
BUN SERPL-MCNC: 32 MG/DL — HIGH (ref 7–23)
CALCIUM SERPL-MCNC: 9.9 MG/DL — SIGNIFICANT CHANGE UP (ref 8.4–10.5)
CHLORIDE SERPL-SCNC: 95 MMOL/L — LOW (ref 96–108)
CO2 SERPL-SCNC: 33 MMOL/L — HIGH (ref 22–31)
CREAT SERPL-MCNC: 1.88 MG/DL — HIGH (ref 0.5–1.3)
EGFR: 26 ML/MIN/1.73M2 — LOW
EOSINOPHIL # BLD AUTO: 0.05 K/UL — SIGNIFICANT CHANGE UP (ref 0–0.5)
EOSINOPHIL NFR BLD AUTO: 0.7 % — SIGNIFICANT CHANGE UP (ref 0–6)
GLUCOSE BLDC GLUCOMTR-MCNC: 145 MG/DL — HIGH (ref 70–99)
GLUCOSE BLDC GLUCOMTR-MCNC: 186 MG/DL — HIGH (ref 70–99)
GLUCOSE BLDC GLUCOMTR-MCNC: 205 MG/DL — HIGH (ref 70–99)
GLUCOSE BLDC GLUCOMTR-MCNC: 256 MG/DL — HIGH (ref 70–99)
GLUCOSE SERPL-MCNC: 175 MG/DL — HIGH (ref 70–99)
HCT VFR BLD CALC: 35.3 % — SIGNIFICANT CHANGE UP (ref 34.5–45)
HCT VFR BLD CALC: 35.7 % — SIGNIFICANT CHANGE UP (ref 34.5–45)
HGB BLD-MCNC: 10.2 G/DL — LOW (ref 11.5–15.5)
HGB BLD-MCNC: 10.2 G/DL — LOW (ref 11.5–15.5)
IMM GRANULOCYTES NFR BLD AUTO: 0.1 % — SIGNIFICANT CHANGE UP (ref 0–0.9)
LYMPHOCYTES # BLD AUTO: 1.24 K/UL — SIGNIFICANT CHANGE UP (ref 1–3.3)
LYMPHOCYTES # BLD AUTO: 17.6 % — SIGNIFICANT CHANGE UP (ref 13–44)
MAGNESIUM SERPL-MCNC: 1.9 MG/DL — SIGNIFICANT CHANGE UP (ref 1.6–2.6)
MCHC RBC-ENTMCNC: 27.3 PG — SIGNIFICANT CHANGE UP (ref 27–34)
MCHC RBC-ENTMCNC: 27.6 PG — SIGNIFICANT CHANGE UP (ref 27–34)
MCHC RBC-ENTMCNC: 28.6 GM/DL — LOW (ref 32–36)
MCHC RBC-ENTMCNC: 28.9 GM/DL — LOW (ref 32–36)
MCV RBC AUTO: 95.7 FL — SIGNIFICANT CHANGE UP (ref 80–100)
MCV RBC AUTO: 95.7 FL — SIGNIFICANT CHANGE UP (ref 80–100)
MONOCYTES # BLD AUTO: 0.46 K/UL — SIGNIFICANT CHANGE UP (ref 0–0.9)
MONOCYTES NFR BLD AUTO: 6.5 % — SIGNIFICANT CHANGE UP (ref 2–14)
NEUTROPHILS # BLD AUTO: 5.25 K/UL — SIGNIFICANT CHANGE UP (ref 1.8–7.4)
NEUTROPHILS NFR BLD AUTO: 74.8 % — SIGNIFICANT CHANGE UP (ref 43–77)
NRBC # BLD: 0 /100 WBCS — SIGNIFICANT CHANGE UP (ref 0–0)
NRBC # BLD: 0 /100 WBCS — SIGNIFICANT CHANGE UP (ref 0–0)
PHOSPHATE SERPL-MCNC: 4 MG/DL — SIGNIFICANT CHANGE UP (ref 2.5–4.5)
PLATELET # BLD AUTO: 297 K/UL — SIGNIFICANT CHANGE UP (ref 150–400)
PLATELET # BLD AUTO: 299 K/UL — SIGNIFICANT CHANGE UP (ref 150–400)
POTASSIUM SERPL-MCNC: 4.1 MMOL/L — SIGNIFICANT CHANGE UP (ref 3.5–5.3)
POTASSIUM SERPL-SCNC: 4.1 MMOL/L — SIGNIFICANT CHANGE UP (ref 3.5–5.3)
PROT SERPL-MCNC: 7.5 G/DL — SIGNIFICANT CHANGE UP (ref 6–8.3)
RBC # BLD: 3.69 M/UL — LOW (ref 3.8–5.2)
RBC # BLD: 3.73 M/UL — LOW (ref 3.8–5.2)
RBC # FLD: 20 % — HIGH (ref 10.3–14.5)
RBC # FLD: 20 % — HIGH (ref 10.3–14.5)
SODIUM SERPL-SCNC: 139 MMOL/L — SIGNIFICANT CHANGE UP (ref 135–145)
WBC # BLD: 7.03 K/UL — SIGNIFICANT CHANGE UP (ref 3.8–10.5)
WBC # BLD: 7.25 K/UL — SIGNIFICANT CHANGE UP (ref 3.8–10.5)
WBC # FLD AUTO: 7.03 K/UL — SIGNIFICANT CHANGE UP (ref 3.8–10.5)
WBC # FLD AUTO: 7.25 K/UL — SIGNIFICANT CHANGE UP (ref 3.8–10.5)

## 2023-05-11 PROCEDURE — 99233 SBSQ HOSP IP/OBS HIGH 50: CPT | Mod: GC

## 2023-05-11 RX ADMIN — PANTOPRAZOLE SODIUM 40 MILLIGRAM(S): 20 TABLET, DELAYED RELEASE ORAL at 07:11

## 2023-05-11 RX ADMIN — Medication 3: at 11:59

## 2023-05-11 RX ADMIN — Medication 1 TABLET(S): at 12:01

## 2023-05-11 RX ADMIN — Medication 3 MILLILITER(S): at 05:17

## 2023-05-11 RX ADMIN — CHLORHEXIDINE GLUCONATE 1 APPLICATION(S): 213 SOLUTION TOPICAL at 05:23

## 2023-05-11 RX ADMIN — Medication 100 MILLIGRAM(S): at 14:23

## 2023-05-11 RX ADMIN — BUMETANIDE 1 MILLIGRAM(S): 0.25 INJECTION INTRAMUSCULAR; INTRAVENOUS at 05:17

## 2023-05-11 RX ADMIN — SPIRONOLACTONE 12.5 MILLIGRAM(S): 25 TABLET, FILM COATED ORAL at 05:22

## 2023-05-11 RX ADMIN — POLYETHYLENE GLYCOL 3350 17 GRAM(S): 17 POWDER, FOR SOLUTION ORAL at 12:00

## 2023-05-11 RX ADMIN — HEPARIN SODIUM 1200 UNIT(S)/HR: 5000 INJECTION INTRAVENOUS; SUBCUTANEOUS at 16:43

## 2023-05-11 RX ADMIN — Medication 3 MILLILITER(S): at 00:11

## 2023-05-11 RX ADMIN — HEPARIN SODIUM 1200 UNIT(S)/HR: 5000 INJECTION INTRAVENOUS; SUBCUTANEOUS at 08:02

## 2023-05-11 RX ADMIN — Medication 100 MILLIGRAM(S): at 05:17

## 2023-05-11 RX ADMIN — Medication 1: at 08:01

## 2023-05-11 RX ADMIN — Medication 100 MILLIGRAM(S): at 05:22

## 2023-05-11 RX ADMIN — GABAPENTIN 200 MILLIGRAM(S): 400 CAPSULE ORAL at 12:01

## 2023-05-11 RX ADMIN — SENNA PLUS 2 TABLET(S): 8.6 TABLET ORAL at 21:50

## 2023-05-11 RX ADMIN — Medication 2: at 17:45

## 2023-05-11 RX ADMIN — BUMETANIDE 1 MILLIGRAM(S): 0.25 INJECTION INTRAMUSCULAR; INTRAVENOUS at 14:23

## 2023-05-11 RX ADMIN — Medication 81 MILLIGRAM(S): at 12:00

## 2023-05-11 RX ADMIN — Medication 7 UNIT(S): at 12:00

## 2023-05-11 RX ADMIN — Medication 3 MILLILITER(S): at 17:12

## 2023-05-11 RX ADMIN — Medication 325 MILLIGRAM(S): at 12:01

## 2023-05-11 RX ADMIN — HEPARIN SODIUM 1200 UNIT(S)/HR: 5000 INJECTION INTRAVENOUS; SUBCUTANEOUS at 19:30

## 2023-05-11 RX ADMIN — Medication 3 MILLILITER(S): at 23:33

## 2023-05-11 RX ADMIN — HEPARIN SODIUM 1200 UNIT(S)/HR: 5000 INJECTION INTRAVENOUS; SUBCUTANEOUS at 08:52

## 2023-05-11 RX ADMIN — HEPARIN SODIUM 1200 UNIT(S)/HR: 5000 INJECTION INTRAVENOUS; SUBCUTANEOUS at 00:10

## 2023-05-11 RX ADMIN — Medication 100 MILLIGRAM(S): at 17:12

## 2023-05-11 RX ADMIN — INSULIN GLARGINE 18 UNIT(S): 100 INJECTION, SOLUTION SUBCUTANEOUS at 21:50

## 2023-05-11 RX ADMIN — ATORVASTATIN CALCIUM 40 MILLIGRAM(S): 80 TABLET, FILM COATED ORAL at 21:50

## 2023-05-11 RX ADMIN — Medication 7 UNIT(S): at 17:46

## 2023-05-11 RX ADMIN — Medication 3 MILLILITER(S): at 12:00

## 2023-05-11 NOTE — PROGRESS NOTE ADULT - ATTENDING COMMENTS
83 year old Latvian speaking female with PMH HTN, HLD, DM2, heart failure with preserved EF, CAD S/P CABG, breast CA and rectal CA who presented with shortness of breath due to CHF exacerbation. She was initially diuresed with IV Bumex but has been transitioned to PO per cardiology recs. She was on Bipap but has been weaned to nasal cannula. Despite diuresis, the patient is unable to be weaned off of oxygen. Pulmonary consult was placed and it was recommended that the patient undergo a RHC which took place 5/10, results are pending. Will follow up with cardiology and pulmonology pending the results. The patient had choledocholithiasis s/p plastic biliary stent 1/20 and was due for repeat ERCP with stent exhange 4/26 as an outpatient however, she was admitted to the hospital. The ERCP can be done as an outpatient but the patient and family preference is to have it done while inpatient. Will discuss with GI if it can be done on 2L NC if cardiology and pulmonology both give clearance. Rest of plan as above.

## 2023-05-11 NOTE — PROGRESS NOTE ADULT - PROBLEM SELECTOR PLAN 1
ProBNP 2505, TTE 3/23 with EF 76%, severe pulmonary hypertension, moderate diastolic dysfunction. POCUS done in ED cw pulmonary edema  - CT chest 5/1 with unchanged pulmonary edema/effusions  - Per cardiology transition bumex 1mg IV BID to bumex 1 mg PO BID on 5/8  - c/w aldactone 12.5mg daily   - Strict I&Os (net negative past 24 hours)  - Daily weights (neg 4 kg since admission)  - cardiology stating pt is now euvolemic; rec pulm eval -> both services recommending RHC, planned for today 5/10 ProBNP 2505, TTE 3/23 with EF 76%, severe pulmonary hypertension, moderate diastolic dysfunction. POCUS done in ED cw pulmonary edema  - CT chest 5/1 with unchanged pulmonary edema/effusions  - Per cardiology transition bumex 1mg IV BID to bumex 1 mg PO BID on 5/8  - c/w aldactone 12.5mg daily   - Strict I&Os (net negative past 24 hours)  - Daily weights (neg 4 kg since admission)  - cardiology stating pt is now euvolemic; rec pulm eval -> f/u RHC results

## 2023-05-11 NOTE — PROGRESS NOTE PEDS - SUBJECTIVE AND OBJECTIVE BOX
DATE OF SERVICE: 05-11-23 @ 12:33    Patient is a 83y old  Female who presents with a chief complaint of Shortness of breath (11 May 2023 11:26)      INTERVAL HISTORY: Feels ok.     REVIEW OF SYSTEMS:  CONSTITUTIONAL: No weakness  EYES/ENT: No visual changes;  No throat pain   NECK: No pain or stiffness  RESPIRATORY: No cough, wheezing; No shortness of breath  CARDIOVASCULAR: No chest pain or palpitations  GASTROINTESTINAL: No abdominal  pain. No nausea, vomiting, or hematemesis  GENITOURINARY: No dysuria, frequency or hematuria  NEUROLOGICAL: No stroke like symptoms  SKIN: No rashes    TELEMETRY Personally reviewed: SR 60-80 PVC/PACs  	  MEDICATIONS:  buMETAnide 1 milliGRAM(s) Oral two times a day  metoprolol tartrate 100 milliGRAM(s) Oral every 12 hours  spironolactone 12.5 milliGRAM(s) Oral daily        PHYSICAL EXAM:  T(C): 36.8 (05-11-23 @ 11:37), Max: 36.9 (05-11-23 @ 08:37)  HR: 70 (05-11-23 @ 11:37) (67 - 103)  BP: 129/58 (05-11-23 @ 11:37) (105/54 - 129/58)  RR: 18 (05-11-23 @ 11:37) (17 - 20)  SpO2: 100% (05-11-23 @ 11:37) (71% - 100%)  Wt(kg): --  I&O's Summary    10 May 2023 07:01  -  11 May 2023 07:00  --------------------------------------------------------  IN: 610 mL / OUT: 1450 mL / NET: -840 mL          Appearance: In no distress	  HEENT:    PERRL, EOMI	  Cardiovascular:  S1 S2, No JVD  Respiratory: Lungs clear to auscultation	  Gastrointestinal:  Soft, Non-tender, + BS	  Vascularature:  No edema of LE  Psychiatric: Appropriate affect   Neuro: no acute focal deficits                               10.2   7.03  )-----------( 299      ( 11 May 2023 06:41 )             35.7     05-11    139  |  95<L>  |  32<H>  ----------------------------<  175<H>  4.1   |  33<H>  |  1.88<H>    Ca    9.9      11 May 2023 06:41  Phos  4.0     05-11  Mg     1.9     05-11    TPro  7.5  /  Alb  3.7  /  TBili  0.2  /  DBili  x   /  AST  14  /  ALT  17  /  AlkPhos  87  05-11        Labs personally reviewed      ASSESSMENT/PLAN: 	    Ms. Batista is a 82 YO woman with PMH of HTN, HLD, DM2, HFpEF (EF 65%), CAD s/p CABG, Breast CA s/p R partial mastectomy, rectal CA s/p resection with recent admission for acute rissa s/p ERCP w/ stent c/b gallbladder perf w/ perc rissa and recurrent SVT, presenting for shortness of breath. Denies CP, palpitations, orthopnea or syncope. Follows as OP with Dr. Hagan.    Problem/Plan -1  Problem: Acute on Chronic Diastolic CHF  - POCUS suggestive of pulm edema  - CXR with no pulm edema or effusion. Prior CT one month ago with moderate effusions and pulm edema  - proBNP 2505 --> 1536  - Prior TTE 3/23 shows preserved EF, hyperdynamic LV function, basal inferoseptum hypokinesis, decreased RV systolic function severe pulm pressures, B/L pleural effusions  - Repeat TTE 5/6 Normal left ventricular cavity size. The left ventricular wall thickness is normal. The left ventricular systolic function is normal, EF 62%. No RWA. Mod PH. PAS pressure 56 mmHg.  - Given elevated PAS and ongoing hypoxia recommend RHC in order to assess hemodynamics- planned for today 5/10 rescheduled 2/2 cath lab schedule  - Strict I&Os, daily weights  - Reports SOB, still requires supplemental O2 intermittently   - Continue Bumex 1mg BID PO   - Initiated [place] 25mg daily     Problem/Plan -2  Problem: CAD   - s/p CABG 2020  - ECG with no ischemia noted  - Trop 21  - c/w ASA, metoprolol and statin  - Prior TTE 3/23 shows preserved EF, hyperdynamic LV function, basal inferoseptal hypokinesis, decreased RV systolic function severe pulm pressures   - 5/5 c/o CP/SOB: trop/CKMB/ECG wnl.     Problem/Plan -3  Problem: hx of Recurrent SVT  - c/w Metoprolol 100mg PO BID  - cont to monitor on tele    Problem/Plan -4  Problem: Atrial Fibrillation  - Patient with n/o A-flutter on previous admission  - c/w Metoprolol   - eliquis 2.5mg PO BID on hold   - c/w Heparin gtt    Problem/Plan- 5  Problem: Hx of Cholecystitis  - ERCP on hold pending improvement in respiratory function        Delaney Vasquez, AG-NP   Sergey Winchester DO [facility]  Cardiovascular Medicine  [address] [place] [address], [address]  Available through call or text on [employer]  Office: [phone]   DATE OF SERVICE: 05-11-23 @ 12:33    Patient is a 83y old  Female who presents with a chief complaint of Shortness of breath (11 May 2023 11:26)      INTERVAL HISTORY: Feels ok.     REVIEW OF SYSTEMS:  CONSTITUTIONAL: No weakness  EYES/ENT: No visual changes;  No throat pain   NECK: No pain or stiffness  RESPIRATORY: No cough, wheezing; No shortness of breath  CARDIOVASCULAR: No chest pain or palpitations  GASTROINTESTINAL: No abdominal  pain. No nausea, vomiting, or hematemesis  GENITOURINARY: No dysuria, frequency or hematuria  NEUROLOGICAL: No stroke like symptoms  SKIN: No rashes    TELEMETRY Personally reviewed: SR 60-80 PVC/PACs  	  MEDICATIONS:  buMETAnide 1 milliGRAM(s) Oral two times a day  metoprolol tartrate 100 milliGRAM(s) Oral every 12 hours  spironolactone 12.5 milliGRAM(s) Oral daily        PHYSICAL EXAM:  T(C): 36.8 (05-11-23 @ 11:37), Max: 36.9 (05-11-23 @ 08:37)  HR: 70 (05-11-23 @ 11:37) (67 - 103)  BP: 129/58 (05-11-23 @ 11:37) (105/54 - 129/58)  RR: 18 (05-11-23 @ 11:37) (17 - 20)  SpO2: 100% (05-11-23 @ 11:37) (71% - 100%)  Wt(kg): --  I&O's Summary    10 May 2023 07:01  -  11 May 2023 07:00  --------------------------------------------------------  IN: 610 mL / OUT: 1450 mL / NET: -840 mL          Appearance: In no distress	  HEENT:    PERRL, EOMI	  Cardiovascular:  S1 S2, No JVD  Respiratory: Lungs clear to auscultation	  Gastrointestinal:  Soft, Non-tender, + BS	  Vascularature:  No edema of LE  Psychiatric: Appropriate affect   Neuro: no acute focal deficits                               10.2   7.03  )-----------( 299      ( 11 May 2023 06:41 )             35.7     05-11    139  |  95<L>  |  32<H>  ----------------------------<  175<H>  4.1   |  33<H>  |  1.88<H>    Ca    9.9      11 May 2023 06:41  Phos  4.0     05-11  Mg     1.9     05-11    TPro  7.5  /  Alb  3.7  /  TBili  0.2  /  DBili  x   /  AST  14  /  ALT  17  /  AlkPhos  87  05-11        Labs personally reviewed      ASSESSMENT/PLAN: 	    Ms. Batista is a 84 YO woman with PMH of HTN, HLD, DM2, HFpEF (EF 65%), CAD s/p CABG, Breast CA s/p R partial mastectomy, rectal CA s/p resection with recent admission for acute rissa s/p ERCP w/ stent c/b gallbladder perf w/ perc rissa and recurrent SVT, presenting for shortness of breath. Denies CP, palpitations, orthopnea or syncope. Follows as OP with Dr. Hagan.    Problem/Plan -1  Problem: Acute on Chronic Diastolic CHF  - POCUS suggestive of pulm edema  - CXR with no pulm edema or effusion. Prior CT one month ago with moderate effusions and pulm edema  - proBNP 2505 --> 1536  - Prior TTE 3/23 shows preserved EF, hyperdynamic LV function, basal inferoseptum hypokinesis, decreased RV systolic function severe pulm pressures, B/L pleural effusions  - Repeat TTE 5/6 Normal left ventricular cavity size. The left ventricular wall thickness is normal. The left ventricular systolic function is normal, EF 62%. No RWA. Mod PH. PAS pressure 56 mmHg.  - Strict I&Os, daily weights  - Reports SOB, still requires supplemental O2 intermittently   - Continue Bumex 1mg BID PO   - Initiated [place] 25mg daily   - RHC with normal PCWP and CI, moderate-severe pulm HTN  - V/Q to r/o PE    Problem/Plan -2  Problem: CAD   - s/p CABG 2020  - ECG with no ischemia noted  - Trop 21  - c/w ASA, metoprolol and statin  - Prior TTE 3/23 shows preserved EF, hyperdynamic LV function, basal inferoseptal hypokinesis, decreased RV systolic function severe pulm pressures   - 5/5 c/o CP/SOB: trop/CKMB/ECG wnl.     Problem/Plan -3  Problem: hx of Recurrent SVT  - c/w Metoprolol 100mg PO BID  - cont to monitor on tele    Problem/Plan -4  Problem: Atrial Fibrillation  - Patient with n/o A-flutter on previous admission  - c/w Metoprolol   - eliquis 2.5mg PO BID on hold   - c/w Heparin gtt    Problem/Plan- 5  Problem: Hx of Cholecystitis  - ERCP on hold pending improvement in respiratory function        Delaney Vasquez, FABIOLA-NP   Sergey Winchester, DO [facility]  Cardiovascular Medicine  [address] [facility], [address]  Available through call or text on [employer] TE[employer]  Office: [phone]

## 2023-05-11 NOTE — PROGRESS NOTE ADULT - ASSESSMENT
ASSESSMENT:  Ms. Batista is a 82 YO woman with PMH of HTN, HLD, DM2, HFpEF (EF 65%), CAD s/p CABG, Breast CA s/p R partial mastectomy, rectal CA s/p resection with previous admission for acute rissa s/p ERCP w/ stent c/b gallbladder perf w/ perc rissa and recurrent SVT, and recent admission for CHF exacerbation presenting for shortness of breath.----out patient nephrologist Dr Kan .    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,  nephrology aware , previous diuretic test showed minimal  function of that kidney)  CVS- BP controlled at present  hypervolemia - improving  Electrolytes - controlled  Anemia- -ckd related  also consistent  iron deficiency , tsat 7, ferritin 28---sp iv venofer  5 doses completed on 4/3023   GI--planning for ERCP--- volume status  improved   met alkalosis --- in the view of diuretic --- cont to monitor   a fib --on heparin   CAD;; EF 62  %  , HRC -- --( volume status )  RECOMMENDATIONS  1)Renal: renal stable   bumex as ordered --1 mg PO x  bid   cont spironolactone 12.5mg po daily ( anti ryan + diuretic )    keep k ~4 and mag 2  dose all medications for GFR ~25 ml/min  monitor I/Os and daily weights   2)CVS: continue BP meds as ordered for now , heparin gtt,   3)Heme: cont daily oral iron .weekly aranesp 40 mcg SQ today   4) Pul:   daily nebs   5) GI--- ERCP on hold due to volume status         [facility]  Office: [phone]         ASSESSMENT:  Ms. Batista is a 84 YO woman with PMH of HTN, HLD, DM2, HFpEF (EF 65%), CAD s/p CABG, Breast CA s/p R partial mastectomy, rectal CA s/p resection with previous admission for acute rissa s/p ERCP w/ stent c/b gallbladder perf w/ perc rissa and recurrent SVT, and recent admission for CHF exacerbation presenting for shortness of breath.----out patient nephrologist Dr Kan .    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,  nephrology aware , previous diuretic test showed minimal  function of that kidney)  CVS- BP controlled at present  hypervolemia - improving  Electrolytes - controlled  Anemia- -ckd related  also consistent  iron deficiency , tsat 7, ferritin 28---sp iv venofer  5 doses completed on 4/3023   GI--planning for ERCP--- volume status  improved   met alkalosis --- in the view of diuretic --- cont to monitor   a fib --on heparin   CAD;; EF 62  %  , HRC -- -- 5/10/23 ( volume status )---pul element   RECOMMENDATIONS  1)Renal: renal stable   bumex as ordered --1 mg PO x  bid   cont spironolactone 12.5mg po daily ( anti ryan + diuretic )   keep k ~4 and mag 2  dose all medications for GFR ~25 ml/min  monitor I/Os and daily weights   2)CVS: continue BP meds as ordered for now , heparin gtt,   3)Heme: cont daily oral iron .weekly aranesp 40 mcg SQ .  4) Pul:   daily nebs   5) GI--- ERCP on hold due to volume status         [facility]  Office: [phone]

## 2023-05-11 NOTE — PROGRESS NOTE ADULT - SUBJECTIVE AND OBJECTIVE BOX
Sultan Sixto MD  PGY 1 Department of Internal Medicine        Patient is a 83y old  Female who presents with a chief complaint of Shortness of breath (10 May 2023 10:37)      SUBJECTIVE / OVERNIGHT EVENTS: Pt seen and examined. No acute overnight events. Denies fevers, chills, CP, SOB, Abdominal pain, N/V, Constipation, Diarrhea        MEDICATIONS  (STANDING):  albuterol/ipratropium for Nebulization 3 milliLiter(s) Nebulizer every 6 hours  aspirin  chewable 81 milliGRAM(s) Oral daily  atorvastatin 40 milliGRAM(s) Oral at bedtime  buMETAnide 1 milliGRAM(s) Oral two times a day  chlorhexidine 2% Cloths 1 Application(s) Topical <User Schedule>  darbepoetin Injectable ViaL 40 MICROGram(s) SubCutaneous every 7 days  dextrose 5%. 1000 milliLiter(s) (100 mL/Hr) IV Continuous <Continuous>  dextrose 5%. 1000 milliLiter(s) (50 mL/Hr) IV Continuous <Continuous>  dextrose 50% Injectable 25 Gram(s) IV Push once  dextrose 50% Injectable 12.5 Gram(s) IV Push once  dextrose 50% Injectable 25 Gram(s) IV Push once  ferrous    sulfate 325 milliGRAM(s) Oral daily  gabapentin 200 milliGRAM(s) Oral daily  glucagon  Injectable 1 milliGRAM(s) IntraMuscular once  heparin  Infusion. 1200 Unit(s)/Hr (12 mL/Hr) IV Continuous <Continuous>  insulin glargine Injectable (LANTUS) 18 Unit(s) SubCutaneous at bedtime  insulin lispro (ADMELOG) corrective regimen sliding scale   SubCutaneous three times a day before meals  insulin lispro (ADMELOG) corrective regimen sliding scale   SubCutaneous at bedtime  insulin lispro Injectable (ADMELOG) 7 Unit(s) SubCutaneous three times a day with meals  metoprolol tartrate 100 milliGRAM(s) Oral every 12 hours  Nephro-ashley 1 Tablet(s) Oral daily  pantoprazole    Tablet 40 milliGRAM(s) Oral before breakfast  phenazopyridine 100 milliGRAM(s) Oral every 8 hours  polyethylene glycol 3350 17 Gram(s) Oral daily  senna 2 Tablet(s) Oral at bedtime  spironolactone 12.5 milliGRAM(s) Oral daily    MEDICATIONS  (PRN):  acetaminophen     Tablet .. 650 milliGRAM(s) Oral every 6 hours PRN Temp greater or equal to 38C (100.4F), Mild Pain (1 - 3)  dextrose Oral Gel 15 Gram(s) Oral once PRN Blood Glucose LESS THAN 70 milliGRAM(s)/deciliter  melatonin 3 milliGRAM(s) Oral at bedtime PRN Insomnia      I&O's Summary    09 May 2023 07:01  -  10 May 2023 07:00  --------------------------------------------------------  IN: 704 mL / OUT: 1400 mL / NET: -696 mL    10 May 2023 07:01  -  11 May 2023 05:44  --------------------------------------------------------  IN: 610 mL / OUT: 700 mL / NET: -90 mL        Vital Signs Last 24 Hrs  T(C): 36.7 (11 May 2023 02:21), Max: 36.7 (10 May 2023 11:54)  T(F): 98 (11 May 2023 02:21), Max: 98 (10 May 2023 11:54)  HR: 72 (11 May 2023 02:21) (69 - 103)  BP: 105/54 (11 May 2023 02:21) (105/54 - 126/55)  BP(mean): --  RR: 18 (11 May 2023 02:21) (17 - 18)  SpO2: 96% (11 May 2023 02:21) (95% - 98%)    Parameters below as of 11 May 2023 02:21  Patient On (Oxygen Delivery Method): nasal cannula  O2 Flow (L/min): 1.5      CAPILLARY BLOOD GLUCOSE      POCT Blood Glucose.: 186 mg/dL (10 May 2023 21:21)  POCT Blood Glucose.: 271 mg/dL (10 May 2023 17:24)  POCT Blood Glucose.: 290 mg/dL (10 May 2023 11:28)  POCT Blood Glucose.: 253 mg/dL (10 May 2023 07:55)      PHYSICAL EXAM:  GENERAL: Sitting in bed in no acute distress  EYES: Conjunctiva noninjected or pale, sclera anicteric  HENT: NC/AT, moist mucous membranes  NECK: Supple, trachea midline.  LUNG: Mild inspiratory crackles b/l. On 2L nasal cannula  CV: RRR, Pulses- Radial: 2+ b/l.   ABDOMEN: Nondistended, nontender  MSK: No visible deformities, nontender extremities. R arm with chronic lymphedema, mild pitting edema in LEs b/l, improved  SKIN: No rashes, bruises  NEURO: AAOx4 (to person, place, time, event), no tremor     LABS:                        9.5    9.42  )-----------( 291      ( 10 May 2023 06:27 )             32.8     Auto Eosinophil # 0.05  / Auto Eosinophil % 0.5   / Auto Neutrophil # 7.01  / Auto Neutrophil % 74.5  / BANDS % x                            9.4    8.24  )-----------( 263      ( 09 May 2023 06:47 )             31.9     Auto Eosinophil # 0.12  / Auto Eosinophil % 1.5   / Auto Neutrophil # 5.90  / Auto Neutrophil % 71.5  / BANDS % x        05-10    138  |  95<L>  |  33<H>  ----------------------------<  205<H>  4.3   |  28  |  1.84<H>  05-09    141  |  97  |  36<H>  ----------------------------<  239<H>  3.7   |  31  |  1.96<H>    Ca    9.4      10 May 2023 06:31  Mg     1.9     05-10  Phos  3.8     05-10  TPro  7.0  /  Alb  3.5  /  TBili  0.2  /  DBili  x   /  AST  17  /  ALT  14  /  AlkPhos  84  05-10  TPro  6.7  /  Alb  3.5  /  TBili  0.2  /  DBili  x   /  AST  12  /  ALT  16  /  AlkPhos  83  05-09    PTT - ( 10 May 2023 21:16 )  PTT:28.1 sec              RADIOLOGY & ADDITIONAL TESTS:    Imaging Personally Reviewed:    Consultant(s) Notes Reviewed:      Care Discussed with Consultants/Other Providers:   Sultan Sixto MD  PGY 1 Department of Internal Medicine        Patient is a 83y old  Female who presents with a chief complaint of Shortness of breath (10 May 2023 10:37)      SUBJECTIVE / OVERNIGHT EVENTS: Pt seen and examined. No acute overnight events. Denies fevers, chills, CP, SOB, Abdominal pain, N/V, Constipation, Diarrhea. Feels well. Spoke with  ID [id number]. Tele NSR. O2 90-98% on oxygen. Noted by nurse that on RA she desaturates to 80s.        MEDICATIONS  (STANDING):  albuterol/ipratropium for Nebulization 3 milliLiter(s) Nebulizer every 6 hours  aspirin  chewable 81 milliGRAM(s) Oral daily  atorvastatin 40 milliGRAM(s) Oral at bedtime  buMETAnide 1 milliGRAM(s) Oral two times a day  chlorhexidine 2% Cloths 1 Application(s) Topical <User Schedule>  darbepoetin Injectable ViaL 40 MICROGram(s) SubCutaneous every 7 days  dextrose 5%. 1000 milliLiter(s) (100 mL/Hr) IV Continuous <Continuous>  dextrose 5%. 1000 milliLiter(s) (50 mL/Hr) IV Continuous <Continuous>  dextrose 50% Injectable 25 Gram(s) IV Push once  dextrose 50% Injectable 12.5 Gram(s) IV Push once  dextrose 50% Injectable 25 Gram(s) IV Push once  ferrous    sulfate 325 milliGRAM(s) Oral daily  gabapentin 200 milliGRAM(s) Oral daily  glucagon  Injectable 1 milliGRAM(s) IntraMuscular once  heparin  Infusion. 1200 Unit(s)/Hr (12 mL/Hr) IV Continuous <Continuous>  insulin glargine Injectable (LANTUS) 18 Unit(s) SubCutaneous at bedtime  insulin lispro (ADMELOG) corrective regimen sliding scale   SubCutaneous three times a day before meals  insulin lispro (ADMELOG) corrective regimen sliding scale   SubCutaneous at bedtime  insulin lispro Injectable (ADMELOG) 7 Unit(s) SubCutaneous three times a day with meals  metoprolol tartrate 100 milliGRAM(s) Oral every 12 hours  Nephro-ashley 1 Tablet(s) Oral daily  pantoprazole    Tablet 40 milliGRAM(s) Oral before breakfast  phenazopyridine 100 milliGRAM(s) Oral every 8 hours  polyethylene glycol 3350 17 Gram(s) Oral daily  senna 2 Tablet(s) Oral at bedtime  spironolactone 12.5 milliGRAM(s) Oral daily    MEDICATIONS  (PRN):  acetaminophen     Tablet .. 650 milliGRAM(s) Oral every 6 hours PRN Temp greater or equal to 38C (100.4F), Mild Pain (1 - 3)  dextrose Oral Gel 15 Gram(s) Oral once PRN Blood Glucose LESS THAN 70 milliGRAM(s)/deciliter  melatonin 3 milliGRAM(s) Oral at bedtime PRN Insomnia      I&O's Summary    09 May 2023 07:01  -  10 May 2023 07:00  --------------------------------------------------------  IN: 704 mL / OUT: 1400 mL / NET: -696 mL    10 May 2023 07:01  -  11 May 2023 05:44  --------------------------------------------------------  IN: 610 mL / OUT: 700 mL / NET: -90 mL        Vital Signs Last 24 Hrs  T(C): 36.7 (11 May 2023 02:21), Max: 36.7 (10 May 2023 11:54)  T(F): 98 (11 May 2023 02:21), Max: 98 (10 May 2023 11:54)  HR: 72 (11 May 2023 02:21) (69 - 103)  BP: 105/54 (11 May 2023 02:21) (105/54 - 126/55)  BP(mean): --  RR: 18 (11 May 2023 02:21) (17 - 18)  SpO2: 96% (11 May 2023 02:21) (95% - 98%)    Parameters below as of 11 May 2023 02:21  Patient On (Oxygen Delivery Method): nasal cannula  O2 Flow (L/min): 1.5      CAPILLARY BLOOD GLUCOSE      POCT Blood Glucose.: 186 mg/dL (10 May 2023 21:21)  POCT Blood Glucose.: 271 mg/dL (10 May 2023 17:24)  POCT Blood Glucose.: 290 mg/dL (10 May 2023 11:28)  POCT Blood Glucose.: 253 mg/dL (10 May 2023 07:55)      PHYSICAL EXAM:  GENERAL: Sitting in bed in no acute distress  EYES: Conjunctiva noninjected or pale, sclera anicteric  HENT: NC/AT, moist mucous membranes  NECK: Supple, trachea midline.  LUNG: Mild inspiratory crackles b/l. On 1L nasal cannula  CV: RRR, Pulses- Radial: 2+ b/l.   ABDOMEN: Nondistended, nontender  MSK: No visible deformities, nontender extremities. R arm with chronic lymphedema, mild pitting edema in LEs b/l, improved  SKIN: No rashes, bruises  NEURO: AAOx4 (to person, place, time, event), no tremor     LABS:                        9.5    9.42  )-----------( 291      ( 10 May 2023 06:27 )             32.8     Auto Eosinophil # 0.05  / Auto Eosinophil % 0.5   / Auto Neutrophil # 7.01  / Auto Neutrophil % 74.5  / BANDS % x                            9.4    8.24  )-----------( 263      ( 09 May 2023 06:47 )             31.9     Auto Eosinophil # 0.12  / Auto Eosinophil % 1.5   / Auto Neutrophil # 5.90  / Auto Neutrophil % 71.5  / BANDS % x        05-10    138  |  95<L>  |  33<H>  ----------------------------<  205<H>  4.3   |  28  |  1.84<H>  05-09    141  |  97  |  36<H>  ----------------------------<  239<H>  3.7   |  31  |  1.96<H>    Ca    9.4      10 May 2023 06:31  Mg     1.9     05-10  Phos  3.8     05-10  TPro  7.0  /  Alb  3.5  /  TBili  0.2  /  DBili  x   /  AST  17  /  ALT  14  /  AlkPhos  84  05-10  TPro  6.7  /  Alb  3.5  /  TBili  0.2  /  DBili  x   /  AST  12  /  ALT  16  /  AlkPhos  83  05-09    PTT - ( 10 May 2023 21:16 )  PTT:28.1 sec              RADIOLOGY & ADDITIONAL TESTS:    Imaging Personally Reviewed:    Consultant(s) Notes Reviewed:      Care Discussed with Consultants/Other Providers:

## 2023-05-11 NOTE — PROGRESS NOTE ADULT - SUBJECTIVE AND OBJECTIVE BOX
NEPHROLOGY     no event over night   no distress   MEDICATIONS  (STANDING):  albuterol/ipratropium for Nebulization 3 milliLiter(s) Nebulizer every 6 hours  aspirin  chewable 81 milliGRAM(s) Oral daily  atorvastatin 40 milliGRAM(s) Oral at bedtime  buMETAnide Injectable 1 milliGRAM(s) IV Push two times a day  chlorhexidine 2% Cloths 1 Application(s) Topical <User Schedule>  darbepoetin Injectable ViaL 40 MICROGram(s) SubCutaneous every 7 days  dextrose 5%. 1000 milliLiter(s) (100 mL/Hr) IV Continuous <Continuous>  dextrose 5%. 1000 milliLiter(s) (50 mL/Hr) IV Continuous <Continuous>  dextrose 50% Injectable 25 Gram(s) IV Push once  dextrose 50% Injectable 12.5 Gram(s) IV Push once  dextrose 50% Injectable 25 Gram(s) IV Push once  ferrous    sulfate 325 milliGRAM(s) Oral daily  gabapentin 200 milliGRAM(s) Oral daily  glucagon  Injectable 1 milliGRAM(s) IntraMuscular once  heparin  Infusion. 1200 Unit(s)/Hr (12 mL/Hr) IV Continuous <Continuous>  insulin glargine Injectable (LANTUS) 10 Unit(s) SubCutaneous at bedtime  insulin lispro (ADMELOG) corrective regimen sliding scale   SubCutaneous at bedtime  insulin lispro (ADMELOG) corrective regimen sliding scale   SubCutaneous three times a day before meals  insulin lispro Injectable (ADMELOG) 5 Unit(s) SubCutaneous three times a day before meals  metoprolol tartrate 100 milliGRAM(s) Oral every 12 hours  Nephro-ashley 1 Tablet(s) Oral daily  pantoprazole    Tablet 40 milliGRAM(s) Oral before breakfast  polyethylene glycol 3350 17 Gram(s) Oral daily  senna 2 Tablet(s) Oral at bedtime  sodium bicarbonate 650 milliGRAM(s) Oral daily  spironolactone 25 milliGRAM(s) Oral daily    VITALS:  T(C): , Max: 36.9 (05-06-23 @ 11:17)  T(F): , Max: 98.5 (05-06-23 @ 11:17)  HR: 76 (05-07-23 @ 04:52)  BP: 124/76 (05-07-23 @ 04:52)  RR: 18 (05-07-23 @ 04:52)  SpO2: 90% (05-07-23 @ 04:52)    I and O's:    05-06 @ 07:01  -  05-07 @ 07:00  --------------------------------------------------------  IN: 480 mL / OUT: 1000 mL / NET: -520 mL    PHYSICAL EXAM:  General: Alerted, oriented  HEENT: MMM  Lungs:CTA-b/l  Heart: RRR S1S2  Abdomen: Soft, NTND  Ext: + le edema  : no [place]    LABS:                        9.2    10.04 )-----------( 265      ( 07 May 2023 06:49 )             31.7     05-07    139  |  96  |  33<H>  ----------------------------<  251<H>  4.1   |  31  |  1.91<H>    Ca    9.5      07 May 2023 06:49  Phos  3.6     05-07  Mg     1.8     05-07    TPro  7.0  /  Alb  3.6  /  TBili  0.4  /  DBili  x   /  AST  17  /  ALT  15  /  AlkPhos  90  05-07

## 2023-05-12 LAB
ALBUMIN SERPL ELPH-MCNC: 3.6 G/DL — SIGNIFICANT CHANGE UP (ref 3.3–5)
ALP SERPL-CCNC: 85 U/L — SIGNIFICANT CHANGE UP (ref 40–120)
ALT FLD-CCNC: 15 U/L — SIGNIFICANT CHANGE UP (ref 10–45)
ANION GAP SERPL CALC-SCNC: 13 MMOL/L — SIGNIFICANT CHANGE UP (ref 5–17)
APTT BLD: 78.3 SEC — HIGH (ref 27.5–35.5)
AST SERPL-CCNC: 17 U/L — SIGNIFICANT CHANGE UP (ref 10–40)
BASOPHILS # BLD AUTO: 0.03 K/UL — SIGNIFICANT CHANGE UP (ref 0–0.2)
BASOPHILS NFR BLD AUTO: 0.4 % — SIGNIFICANT CHANGE UP (ref 0–2)
BILIRUB SERPL-MCNC: 0.3 MG/DL — SIGNIFICANT CHANGE UP (ref 0.2–1.2)
BUN SERPL-MCNC: 34 MG/DL — HIGH (ref 7–23)
CALCIUM SERPL-MCNC: 9.9 MG/DL — SIGNIFICANT CHANGE UP (ref 8.4–10.5)
CHLORIDE SERPL-SCNC: 94 MMOL/L — LOW (ref 96–108)
CO2 SERPL-SCNC: 32 MMOL/L — HIGH (ref 22–31)
CREAT SERPL-MCNC: 1.89 MG/DL — HIGH (ref 0.5–1.3)
EGFR: 26 ML/MIN/1.73M2 — LOW
EOSINOPHIL # BLD AUTO: 0.06 K/UL — SIGNIFICANT CHANGE UP (ref 0–0.5)
EOSINOPHIL NFR BLD AUTO: 0.7 % — SIGNIFICANT CHANGE UP (ref 0–6)
GLUCOSE BLDC GLUCOMTR-MCNC: 195 MG/DL — HIGH (ref 70–99)
GLUCOSE BLDC GLUCOMTR-MCNC: 270 MG/DL — HIGH (ref 70–99)
GLUCOSE BLDC GLUCOMTR-MCNC: 356 MG/DL — HIGH (ref 70–99)
GLUCOSE BLDC GLUCOMTR-MCNC: 396 MG/DL — HIGH (ref 70–99)
GLUCOSE SERPL-MCNC: 142 MG/DL — HIGH (ref 70–99)
HCT VFR BLD CALC: 35.8 % — SIGNIFICANT CHANGE UP (ref 34.5–45)
HGB BLD-MCNC: 10.5 G/DL — LOW (ref 11.5–15.5)
IMM GRANULOCYTES NFR BLD AUTO: 0.2 % — SIGNIFICANT CHANGE UP (ref 0–0.9)
LYMPHOCYTES # BLD AUTO: 1.75 K/UL — SIGNIFICANT CHANGE UP (ref 1–3.3)
LYMPHOCYTES # BLD AUTO: 21.8 % — SIGNIFICANT CHANGE UP (ref 13–44)
MAGNESIUM SERPL-MCNC: 1.8 MG/DL — SIGNIFICANT CHANGE UP (ref 1.6–2.6)
MCHC RBC-ENTMCNC: 27.6 PG — SIGNIFICANT CHANGE UP (ref 27–34)
MCHC RBC-ENTMCNC: 29.3 GM/DL — LOW (ref 32–36)
MCV RBC AUTO: 94 FL — SIGNIFICANT CHANGE UP (ref 80–100)
MONOCYTES # BLD AUTO: 0.57 K/UL — SIGNIFICANT CHANGE UP (ref 0–0.9)
MONOCYTES NFR BLD AUTO: 7.1 % — SIGNIFICANT CHANGE UP (ref 2–14)
NEUTROPHILS # BLD AUTO: 5.59 K/UL — SIGNIFICANT CHANGE UP (ref 1.8–7.4)
NEUTROPHILS NFR BLD AUTO: 69.8 % — SIGNIFICANT CHANGE UP (ref 43–77)
NRBC # BLD: 0 /100 WBCS — SIGNIFICANT CHANGE UP (ref 0–0)
PHOSPHATE SERPL-MCNC: 3.6 MG/DL — SIGNIFICANT CHANGE UP (ref 2.5–4.5)
PLATELET # BLD AUTO: 296 K/UL — SIGNIFICANT CHANGE UP (ref 150–400)
POTASSIUM SERPL-MCNC: 4 MMOL/L — SIGNIFICANT CHANGE UP (ref 3.5–5.3)
POTASSIUM SERPL-SCNC: 4 MMOL/L — SIGNIFICANT CHANGE UP (ref 3.5–5.3)
PROT SERPL-MCNC: 7.3 G/DL — SIGNIFICANT CHANGE UP (ref 6–8.3)
RBC # BLD: 3.81 M/UL — SIGNIFICANT CHANGE UP (ref 3.8–5.2)
RBC # FLD: 19.9 % — HIGH (ref 10.3–14.5)
SODIUM SERPL-SCNC: 139 MMOL/L — SIGNIFICANT CHANGE UP (ref 135–145)
WBC # BLD: 8.02 K/UL — SIGNIFICANT CHANGE UP (ref 3.8–10.5)
WBC # FLD AUTO: 8.02 K/UL — SIGNIFICANT CHANGE UP (ref 3.8–10.5)

## 2023-05-12 PROCEDURE — 78582 LUNG VENTILAT&PERFUS IMAGING: CPT | Mod: 26

## 2023-05-12 PROCEDURE — 71045 X-RAY EXAM CHEST 1 VIEW: CPT | Mod: 26

## 2023-05-12 PROCEDURE — 99233 SBSQ HOSP IP/OBS HIGH 50: CPT | Mod: GC

## 2023-05-12 RX ORDER — MAGNESIUM OXIDE 400 MG ORAL TABLET 241.3 MG
400 TABLET ORAL ONCE
Refills: 0 | Status: COMPLETED | OUTPATIENT
Start: 2023-05-12 | End: 2023-05-12

## 2023-05-12 RX ADMIN — Medication 81 MILLIGRAM(S): at 12:06

## 2023-05-12 RX ADMIN — Medication 3 MILLILITER(S): at 05:10

## 2023-05-12 RX ADMIN — Medication 7 UNIT(S): at 12:09

## 2023-05-12 RX ADMIN — BUMETANIDE 1 MILLIGRAM(S): 0.25 INJECTION INTRAMUSCULAR; INTRAVENOUS at 05:10

## 2023-05-12 RX ADMIN — SPIRONOLACTONE 12.5 MILLIGRAM(S): 25 TABLET, FILM COATED ORAL at 05:10

## 2023-05-12 RX ADMIN — Medication 3 MILLILITER(S): at 12:06

## 2023-05-12 RX ADMIN — BUMETANIDE 1 MILLIGRAM(S): 0.25 INJECTION INTRAMUSCULAR; INTRAVENOUS at 13:29

## 2023-05-12 RX ADMIN — Medication 1: at 07:53

## 2023-05-12 RX ADMIN — GABAPENTIN 200 MILLIGRAM(S): 400 CAPSULE ORAL at 12:07

## 2023-05-12 RX ADMIN — MAGNESIUM OXIDE 400 MG ORAL TABLET 400 MILLIGRAM(S): 241.3 TABLET ORAL at 07:52

## 2023-05-12 RX ADMIN — CHLORHEXIDINE GLUCONATE 1 APPLICATION(S): 213 SOLUTION TOPICAL at 05:10

## 2023-05-12 RX ADMIN — Medication 3 MILLILITER(S): at 17:11

## 2023-05-12 RX ADMIN — Medication 100 MILLIGRAM(S): at 05:09

## 2023-05-12 RX ADMIN — Medication 100 MILLIGRAM(S): at 17:12

## 2023-05-12 RX ADMIN — Medication 3: at 21:31

## 2023-05-12 RX ADMIN — ATORVASTATIN CALCIUM 40 MILLIGRAM(S): 80 TABLET, FILM COATED ORAL at 21:31

## 2023-05-12 RX ADMIN — HEPARIN SODIUM 1200 UNIT(S)/HR: 5000 INJECTION INTRAVENOUS; SUBCUTANEOUS at 07:28

## 2023-05-12 RX ADMIN — HEPARIN SODIUM 1200 UNIT(S)/HR: 5000 INJECTION INTRAVENOUS; SUBCUTANEOUS at 19:24

## 2023-05-12 RX ADMIN — Medication 1 TABLET(S): at 12:06

## 2023-05-12 RX ADMIN — Medication 7 UNIT(S): at 17:11

## 2023-05-12 RX ADMIN — Medication 325 MILLIGRAM(S): at 12:06

## 2023-05-12 RX ADMIN — PANTOPRAZOLE SODIUM 40 MILLIGRAM(S): 20 TABLET, DELAYED RELEASE ORAL at 05:09

## 2023-05-12 RX ADMIN — Medication 5: at 17:11

## 2023-05-12 RX ADMIN — INSULIN GLARGINE 18 UNIT(S): 100 INJECTION, SOLUTION SUBCUTANEOUS at 21:31

## 2023-05-12 RX ADMIN — Medication 3: at 12:08

## 2023-05-12 RX ADMIN — Medication 7 UNIT(S): at 07:53

## 2023-05-12 NOTE — PROGRESS NOTE ADULT - SUBJECTIVE AND OBJECTIVE BOX
Sultan Sixto MD  PGY 1 Department of Internal Medicine        Patient is a 83y old  Female who presents with a chief complaint of Shortness of breath (11 May 2023 11:26)      SUBJECTIVE / OVERNIGHT EVENTS: Pt seen and examined. No acute overnight events. Denies fevers, chills, CP, SOB, Abdominal pain, N/V, Constipation, Diarrhea        MEDICATIONS  (STANDING):  albuterol/ipratropium for Nebulization 3 milliLiter(s) Nebulizer every 6 hours  aspirin  chewable 81 milliGRAM(s) Oral daily  atorvastatin 40 milliGRAM(s) Oral at bedtime  buMETAnide 1 milliGRAM(s) Oral two times a day  chlorhexidine 2% Cloths 1 Application(s) Topical <User Schedule>  darbepoetin Injectable ViaL 40 MICROGram(s) SubCutaneous every 7 days  dextrose 5%. 1000 milliLiter(s) (50 mL/Hr) IV Continuous <Continuous>  dextrose 5%. 1000 milliLiter(s) (100 mL/Hr) IV Continuous <Continuous>  dextrose 50% Injectable 25 Gram(s) IV Push once  dextrose 50% Injectable 25 Gram(s) IV Push once  dextrose 50% Injectable 12.5 Gram(s) IV Push once  ferrous    sulfate 325 milliGRAM(s) Oral daily  gabapentin 200 milliGRAM(s) Oral daily  glucagon  Injectable 1 milliGRAM(s) IntraMuscular once  heparin  Infusion. 1200 Unit(s)/Hr (12 mL/Hr) IV Continuous <Continuous>  insulin glargine Injectable (LANTUS) 18 Unit(s) SubCutaneous at bedtime  insulin lispro (ADMELOG) corrective regimen sliding scale   SubCutaneous three times a day before meals  insulin lispro (ADMELOG) corrective regimen sliding scale   SubCutaneous at bedtime  insulin lispro Injectable (ADMELOG) 7 Unit(s) SubCutaneous three times a day with meals  metoprolol tartrate 100 milliGRAM(s) Oral every 12 hours  Nephro-ashley 1 Tablet(s) Oral daily  pantoprazole    Tablet 40 milliGRAM(s) Oral before breakfast  polyethylene glycol 3350 17 Gram(s) Oral daily  senna 2 Tablet(s) Oral at bedtime  spironolactone 12.5 milliGRAM(s) Oral daily    MEDICATIONS  (PRN):  acetaminophen     Tablet .. 650 milliGRAM(s) Oral every 6 hours PRN Temp greater or equal to 38C (100.4F), Mild Pain (1 - 3)  dextrose Oral Gel 15 Gram(s) Oral once PRN Blood Glucose LESS THAN 70 milliGRAM(s)/deciliter  melatonin 3 milliGRAM(s) Oral at bedtime PRN Insomnia      I&O's Summary    10 May 2023 07:01  -  11 May 2023 07:00  --------------------------------------------------------  IN: 610 mL / OUT: 1450 mL / NET: -840 mL    11 May 2023 07:01  -  12 May 2023 05:17  --------------------------------------------------------  IN: 1092 mL / OUT: 1000 mL / NET: 92 mL        Vital Signs Last 24 Hrs  T(C): 36.4 (12 May 2023 04:38), Max: 36.9 (11 May 2023 08:37)  T(F): 97.5 (12 May 2023 04:38), Max: 98.5 (11 May 2023 08:37)  HR: 83 (12 May 2023 04:38) (67 - 83)  BP: 112/74 (12 May 2023 04:38) (101/63 - 131/59)  BP(mean): --  RR: 18 (12 May 2023 04:38) (17 - 20)  SpO2: 96% (12 May 2023 04:38) (71% - 100%)    Parameters below as of 12 May 2023 04:38  Patient On (Oxygen Delivery Method): nasal cannula  O2 Flow (L/min): 1      CAPILLARY BLOOD GLUCOSE      POCT Blood Glucose.: 145 mg/dL (11 May 2023 21:20)  POCT Blood Glucose.: 205 mg/dL (11 May 2023 17:31)  POCT Blood Glucose.: 256 mg/dL (11 May 2023 11:52)  POCT Blood Glucose.: 186 mg/dL (11 May 2023 07:39)      PHYSICAL EXAM:  GENERAL: Sitting in bed in no acute distress  EYES: Conjunctiva noninjected or pale, sclera anicteric  HENT: NC/AT, moist mucous membranes  NECK: Supple, trachea midline.  LUNG: Mild inspiratory crackles b/l. On 1L nasal cannula  CV: RRR, Pulses- Radial: 2+ b/l.   ABDOMEN: Nondistended, nontender  MSK: No visible deformities, nontender extremities. R arm with chronic lymphedema, mild pitting edema in LEs b/l, improved  SKIN: No rashes, bruises  NEURO: AAOx4 (to person, place, time, event), no tremor      LABS:                        10.2   7.03  )-----------( 299      ( 11 May 2023 06:41 )             35.7     Auto Eosinophil # 0.05  / Auto Eosinophil % 0.7   / Auto Neutrophil # 5.25  / Auto Neutrophil % 74.8  / BANDS % x                            9.5    9.42  )-----------( 291      ( 10 May 2023 06:27 )             32.8     Auto Eosinophil # 0.05  / Auto Eosinophil % 0.5   / Auto Neutrophil # 7.01  / Auto Neutrophil % 74.5  / BANDS % x        05-11    139  |  95<L>  |  32<H>  ----------------------------<  175<H>  4.1   |  33<H>  |  1.88<H>  05-10    138  |  95<L>  |  33<H>  ----------------------------<  205<H>  4.3   |  28  |  1.84<H>    Ca    9.9      11 May 2023 06:41  Mg     1.9     05-11  Phos  4.0     05-11  TPro  7.5  /  Alb  3.7  /  TBili  0.2  /  DBili  x   /  AST  14  /  ALT  17  /  AlkPhos  87  05-11  TPro  7.0  /  Alb  3.5  /  TBili  0.2  /  DBili  x   /  AST  17  /  ALT  14  /  AlkPhos  84  05-10    PTT - ( 11 May 2023 15:09 )  PTT:63.4 sec              RADIOLOGY & ADDITIONAL TESTS:    Imaging Personally Reviewed:    Consultant(s) Notes Reviewed:      Care Discussed with Consultants/Other Providers:   Sultan Sixto MD  PGY 1 Department of Internal Medicine        Patient is a 83y old  Female who presents with a chief complaint of Shortness of breath (11 May 2023 11:26)      SUBJECTIVE / OVERNIGHT EVENTS: Pt seen and examined. No acute overnight events. Denies fevers, chills, CP, SOB, Abdominal pain, N/V, Constipation, Diarrhea. Feels stable. Tele no events.         MEDICATIONS  (STANDING):  albuterol/ipratropium for Nebulization 3 milliLiter(s) Nebulizer every 6 hours  aspirin  chewable 81 milliGRAM(s) Oral daily  atorvastatin 40 milliGRAM(s) Oral at bedtime  buMETAnide 1 milliGRAM(s) Oral two times a day  chlorhexidine 2% Cloths 1 Application(s) Topical <User Schedule>  darbepoetin Injectable ViaL 40 MICROGram(s) SubCutaneous every 7 days  dextrose 5%. 1000 milliLiter(s) (50 mL/Hr) IV Continuous <Continuous>  dextrose 5%. 1000 milliLiter(s) (100 mL/Hr) IV Continuous <Continuous>  dextrose 50% Injectable 25 Gram(s) IV Push once  dextrose 50% Injectable 25 Gram(s) IV Push once  dextrose 50% Injectable 12.5 Gram(s) IV Push once  ferrous    sulfate 325 milliGRAM(s) Oral daily  gabapentin 200 milliGRAM(s) Oral daily  glucagon  Injectable 1 milliGRAM(s) IntraMuscular once  heparin  Infusion. 1200 Unit(s)/Hr (12 mL/Hr) IV Continuous <Continuous>  insulin glargine Injectable (LANTUS) 18 Unit(s) SubCutaneous at bedtime  insulin lispro (ADMELOG) corrective regimen sliding scale   SubCutaneous three times a day before meals  insulin lispro (ADMELOG) corrective regimen sliding scale   SubCutaneous at bedtime  insulin lispro Injectable (ADMELOG) 7 Unit(s) SubCutaneous three times a day with meals  metoprolol tartrate 100 milliGRAM(s) Oral every 12 hours  Nephro-ashley 1 Tablet(s) Oral daily  pantoprazole    Tablet 40 milliGRAM(s) Oral before breakfast  polyethylene glycol 3350 17 Gram(s) Oral daily  senna 2 Tablet(s) Oral at bedtime  spironolactone 12.5 milliGRAM(s) Oral daily    MEDICATIONS  (PRN):  acetaminophen     Tablet .. 650 milliGRAM(s) Oral every 6 hours PRN Temp greater or equal to 38C (100.4F), Mild Pain (1 - 3)  dextrose Oral Gel 15 Gram(s) Oral once PRN Blood Glucose LESS THAN 70 milliGRAM(s)/deciliter  melatonin 3 milliGRAM(s) Oral at bedtime PRN Insomnia      I&O's Summary    10 May 2023 07:01  -  11 May 2023 07:00  --------------------------------------------------------  IN: 610 mL / OUT: 1450 mL / NET: -840 mL    11 May 2023 07:01  -  12 May 2023 05:17  --------------------------------------------------------  IN: 1092 mL / OUT: 1000 mL / NET: 92 mL        Vital Signs Last 24 Hrs  T(C): 36.4 (12 May 2023 04:38), Max: 36.9 (11 May 2023 08:37)  T(F): 97.5 (12 May 2023 04:38), Max: 98.5 (11 May 2023 08:37)  HR: 83 (12 May 2023 04:38) (67 - 83)  BP: 112/74 (12 May 2023 04:38) (101/63 - 131/59)  BP(mean): --  RR: 18 (12 May 2023 04:38) (17 - 20)  SpO2: 96% (12 May 2023 04:38) (71% - 100%)    Parameters below as of 12 May 2023 04:38  Patient On (Oxygen Delivery Method): nasal cannula  O2 Flow (L/min): 1      CAPILLARY BLOOD GLUCOSE      POCT Blood Glucose.: 145 mg/dL (11 May 2023 21:20)  POCT Blood Glucose.: 205 mg/dL (11 May 2023 17:31)  POCT Blood Glucose.: 256 mg/dL (11 May 2023 11:52)  POCT Blood Glucose.: 186 mg/dL (11 May 2023 07:39)      PHYSICAL EXAM:  GENERAL: Sitting in bed in no acute distress  EYES: Conjunctiva noninjected or pale, sclera anicteric  HENT: NC/AT, moist mucous membranes  NECK: Supple, trachea midline.  LUNG: Mild inspiratory crackles b/l. On 2.5L nasal cannula, titrated down to 1L  CV: RRR, Pulses- Radial: 2+ b/l.   ABDOMEN: Nondistended, nontender  MSK: No visible deformities, nontender extremities. R arm with chronic lymphedema, mild pitting edema in LEs b/l, improved  SKIN: No rashes, bruises  NEURO: AAOx4 (to person, place, time, event), no tremor      LABS:                        10.2   7.03  )-----------( 299      ( 11 May 2023 06:41 )             35.7     Auto Eosinophil # 0.05  / Auto Eosinophil % 0.7   / Auto Neutrophil # 5.25  / Auto Neutrophil % 74.8  / BANDS % x                            9.5    9.42  )-----------( 291      ( 10 May 2023 06:27 )             32.8     Auto Eosinophil # 0.05  / Auto Eosinophil % 0.5   / Auto Neutrophil # 7.01  / Auto Neutrophil % 74.5  / BANDS % x        05-11    139  |  95<L>  |  32<H>  ----------------------------<  175<H>  4.1   |  33<H>  |  1.88<H>  05-10    138  |  95<L>  |  33<H>  ----------------------------<  205<H>  4.3   |  28  |  1.84<H>    Ca    9.9      11 May 2023 06:41  Mg     1.9     05-11  Phos  4.0     05-11  TPro  7.5  /  Alb  3.7  /  TBili  0.2  /  DBili  x   /  AST  14  /  ALT  17  /  AlkPhos  87  05-11  TPro  7.0  /  Alb  3.5  /  TBili  0.2  /  DBili  x   /  AST  17  /  ALT  14  /  AlkPhos  84  05-10    PTT - ( 11 May 2023 15:09 )  PTT:63.4 sec              RADIOLOGY & ADDITIONAL TESTS:    Imaging Personally Reviewed:    Consultant(s) Notes Reviewed:      Care Discussed with Consultants/Other Providers:

## 2023-05-12 NOTE — PROGRESS NOTE ADULT - PROBLEM SELECTOR PLAN 1
ProBNP 2505, TTE 3/23 with EF 76%, severe pulmonary hypertension, moderate diastolic dysfunction. POCUS done in ED cw pulmonary edema  - CT chest 5/1 with unchanged pulmonary edema/effusions  - Per cardiology transition bumex 1mg IV BID to bumex 1 mg PO BID on 5/8  - c/w aldactone 12.5mg daily   - Strict I&Os (net negative past 24 hours)  - Daily weights (neg 4 kg since admission)  - cardiology stating pt is now euvolemic; rec pulm eval -> f/u RHC results ProBNP 2505, TTE 3/23 with EF 76%, severe pulmonary hypertension, moderate diastolic dysfunction. POCUS done in ED cw pulmonary edema  - CT chest 5/1 with unchanged pulmonary edema/effusions  - Per cardiology transition bumex 1mg IV BID to bumex 1 mg PO BID on 5/8  - c/w aldactone 12.5mg daily   - Strict I&Os (net negative past 24 hours)  - Daily weights (neg 4 kg since admission)  - cardiology stating pt is now euvolemic; rec pulm eval -> f/u RHC results  - pulm rec V/Q scan -> WNL, optimized from pulm perspective

## 2023-05-12 NOTE — PROGRESS NOTE ADULT - ASSESSMENT
ASSESSMENT:  Ms. Batista is a 82 YO woman with PMH of HTN, HLD, DM2, HFpEF (EF 65%), CAD s/p CABG, Breast CA s/p R partial mastectomy, rectal CA s/p resection with previous admission for acute rissa s/p ERCP w/ stent c/b gallbladder perf w/ perc rissa and recurrent SVT, and recent admission for CHF exacerbation presenting for shortness of breath.----out patient nephrologist Dr Kan .    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,  nephrology aware , previous diuretic test showed minimal  function of that kidney)  CVS- BP controlled at present  hypervolemia - improving  Electrolytes - controlled  Anemia- -ckd related  also consistent  iron deficiency , tsat 7, ferritin 28---sp iv venofer  5 doses completed on 4/3023   GI--planning for ERCP--- volume status  improved   met alkalosis --- in the view of diuretic --- cont to monitor   a fib --on heparin   CAD;; EF 62  %  , HRC -- -- 5/10/23  RECOMMENDATIONS  1)Renal: renal stable   bumex 1 mg PO x  bid   cont spironolactone 12.5mg po daily ( anti ryan + diuretic )   keep k ~4 and mag 2  dose all medications for GFR ~25 ml/min  monitor I/Os and daily weights---- pt is  euvolemic   2)CVS: continue BP meds as ordered for now , heparin gtt,   3)Heme: cont daily oral iron .weekly aranesp 40 mcg SQ .  4) Pul:   daily nebs   5) GI--- ERCP on hold due to volume status ----- improved euvolemic         [facility]  Office: [phone]

## 2023-05-12 NOTE — PROGRESS NOTE ADULT - PROBLEM SELECTOR PLAN 3
- ERCP was planned for outpatient 4/26  - GI aware, will await improvement in respiratory function - ERCP was planned for outpatient 4/26  - GI aware, will await improvement in respiratory function  - once pulm and cards documents clearance, will reach out to GI

## 2023-05-12 NOTE — PROGRESS NOTE ADULT - SUBJECTIVE AND OBJECTIVE BOX
Interval Events:  Patient sitting in chair at time of exam  Feels well. Denies shortness of breath at rest.  Saturating well on 2-3 L NC.  VQ scan today shows low probability of PE    REVIEW OF SYSTEMS:  [x] All other systems negative  [ ] Unable to assess ROS because ________    OBJECTIVE:  ICU Vital Signs Last 24 Hrs  T(C): 36.6 (12 May 2023 12:59), Max: 36.8 (12 May 2023 08:56)  T(F): 97.8 (12 May 2023 12:59), Max: 98.3 (12 May 2023 08:56)  HR: 84 (12 May 2023 13:02) (69 - 84)  BP: 115/68 (12 May 2023 12:59) (101/63 - 131/59)  BP(mean): --  ABP: --  ABP(mean): --  RR: 18 (12 May 2023 12:59) (17 - 18)  SpO2: 93% (12 May 2023 13:02) (85% - 99%)    O2 Parameters below as of 12 May 2023 13:02  Patient On (Oxygen Delivery Method): nasal cannula  O2 Flow (L/min): 3            05-11 @ 07:01  -  05-12 @ 07:00  --------------------------------------------------------  IN: 1116 mL / OUT: 1250 mL / NET: -134 mL    05-12 @ 07:01  -  05-12 @ 14:31  --------------------------------------------------------  IN: 450 mL / OUT: 600 mL / NET: -150 mL      CAPILLARY BLOOD GLUCOSE      POCT Blood Glucose.: 270 mg/dL (12 May 2023 12:00)      PHYSICAL EXAM:  General: No apparent distress  HEENT: NC/AT  Neck: Supple  Respiratory: CTAB. No wheezing, good air entry  Cardiovascular: RRR, no murmur, mild LE edema  Abdomen: Soft, nontender, nondistended  Extremities: Warm  Skin: Intact  Neurological: A&Ox3, no focal deficits  Psychiatry: Normal mood and affect    HOSPITAL MEDICATIONS:  aspirin  chewable 81 milliGRAM(s) Oral daily  heparin  Infusion. 1200 Unit(s)/Hr IV Continuous <Continuous>      buMETAnide 1 milliGRAM(s) Oral two times a day  metoprolol tartrate 100 milliGRAM(s) Oral every 12 hours  spironolactone 12.5 milliGRAM(s) Oral daily    atorvastatin 40 milliGRAM(s) Oral at bedtime  dextrose 50% Injectable 25 Gram(s) IV Push once  dextrose 50% Injectable 12.5 Gram(s) IV Push once  dextrose 50% Injectable 25 Gram(s) IV Push once  dextrose Oral Gel 15 Gram(s) Oral once PRN  glucagon  Injectable 1 milliGRAM(s) IntraMuscular once  insulin glargine Injectable (LANTUS) 18 Unit(s) SubCutaneous at bedtime  insulin lispro (ADMELOG) corrective regimen sliding scale   SubCutaneous three times a day before meals  insulin lispro (ADMELOG) corrective regimen sliding scale   SubCutaneous at bedtime  insulin lispro Injectable (ADMELOG) 7 Unit(s) SubCutaneous three times a day with meals    albuterol/ipratropium for Nebulization 3 milliLiter(s) Nebulizer every 6 hours    acetaminophen     Tablet .. 650 milliGRAM(s) Oral every 6 hours PRN  gabapentin 200 milliGRAM(s) Oral daily  melatonin 3 milliGRAM(s) Oral at bedtime PRN    pantoprazole    Tablet 40 milliGRAM(s) Oral before breakfast  polyethylene glycol 3350 17 Gram(s) Oral daily  senna 2 Tablet(s) Oral at bedtime        dextrose 5%. 1000 milliLiter(s) IV Continuous <Continuous>  dextrose 5%. 1000 milliLiter(s) IV Continuous <Continuous>  ferrous    sulfate 325 milliGRAM(s) Oral daily  Nephro-ashley 1 Tablet(s) Oral daily    darbepoetin Injectable ViaL 40 MICROGram(s) SubCutaneous every 7 days    chlorhexidine 2% Cloths 1 Application(s) Topical <User Schedule>        LABS:                        10.5   8.02  )-----------( 296      ( 12 May 2023 06:23 )             35.8     Hgb Trend: 10.5<--, 10.2<--, 9.5<--, 9.4<--, 9.6<--  05-12    139  |  94<L>  |  34<H>  ----------------------------<  142<H>  4.0   |  32<H>  |  1.89<H>    Ca    9.9      12 May 2023 06:23  Phos  3.6     05-12  Mg     1.8     05-12    TPro  7.3  /  Alb  3.6  /  TBili  0.3  /  DBili  x   /  AST  17  /  ALT  15  /  AlkPhos  85  05-12    Creatinine Trend: 1.89<--, 1.88<--, 1.84<--, 1.96<--, 1.96<--, 1.91<--  PTT - ( 12 May 2023 06:23 )  PTT:78.3 sec          MICROBIOLOGY:     RADIOLOGY:  [x] Reviewed and interpreted by me  CXR 5/12/23 no large consolidations or effusion

## 2023-05-12 NOTE — PROGRESS NOTE ADULT - ASSESSMENT
83 year old female with history of HTN, HLD, DM2, chronic diastolic HF, LE DVT, CAD s/p CABG, breast cancer s/p R partial mastectomy, rectal cancer s/p resection, prior admission with choledocholithiasis s/p ERCP and biliary stent placement 1/20/2023 and acute cholecystitis s/p percutaneous cholecystostomy tube 1/25/2023 who presented with shortness of breath.     Admitted for acute on chronic diastolic HF. Volume balance improved with diuresis. Remains on supplemental O2. Recent TTE shows moderate pulmonary hypertension. RHC performed this admission which suggests post-capillary PH. VQ performed today given history of DVT, results shows low probability of PE.    Clinically patient appears euvolemic. Denies dyspnea at rest. Saturating well on O2 supplementation ranging 1-3 L NC.  Would continue O2 supplementation. Will likely require for home use as well.  Patient is optimized from a pulmonary standpoint to proceed with ERCP for stent removal.  Recommend outpatient pulmonary follow up, including PFT, for further evaluation    Discussed with patient and her daughter at bedside   83 year old female with history of HTN, HLD, DM2, chronic diastolic HF, LE DVT, CAD s/p CABG, breast cancer s/p R partial mastectomy, rectal cancer s/p resection, prior admission with choledocholithiasis s/p ERCP and biliary stent placement 1/20/2023 and acute cholecystitis s/p percutaneous cholecystostomy tube 1/25/2023 who presented with shortness of breath.     Admitted for acute on chronic diastolic HF. Volume balance improved with diuresis. Remains on supplemental O2. Recent TTE shows moderate pulmonary hypertension. RHC performed this admission which suggests post-capillary PH. VQ performed today given history of DVT, results shows low probability of PE.    Acute on chronic diastolic HF  -Clinically patient appears euvolemic. Denies dyspnea at rest.    Pulmonary Hypertension  -RHC suggestive of post-capillary PH    Hypoxemia  -Saturating well on O2 supplementation ranging 1-3 L NC.  -Will likely require for home use  -Outpatient pulmonary follow up, including PFT, for further evaluation    Choledocholithiasis s/p ERCP and biliary stent placement  -Patient is optimized from a pulmonary standpoint to proceed with ERCP for stent removal.      Discussed with patient and her daughter at bedside as well as primary team   83 year old female with history of HTN, HLD, DM2, chronic diastolic HF, LE DVT, CAD s/p CABG, breast cancer s/p R partial mastectomy, rectal cancer s/p resection, prior admission with choledocholithiasis s/p ERCP and biliary stent placement 1/20/2023 and acute cholecystitis s/p percutaneous cholecystostomy tube 1/25/2023 who presented with shortness of breath.     Admitted for acute on chronic diastolic HF. Volume balance improved with diuresis. Remains on supplemental O2. Recent TTE shows moderate pulmonary hypertension. RHC performed this admission which suggests post-capillary PH. VQ performed today given history of DVT, results shows low probability of PE.    Acute on chronic diastolic HF  -Clinically patient appears euvolemic. Denies dyspnea at rest.    Pulmonary Hypertension  -RHC suggestive of post-capillary PH    Hypoxemia  -Saturating well on O2 supplementation ranging 1-3 L NC.  -Will likely require for home use  -Outpatient pulmonary follow up, including PFT, for further evaluation    Choledocholithiasis s/p ERCP and biliary stent placement  -Patient is optimized from a pulmonary standpoint  -No absolute pulmonary contraindications to proceed with ERCP    Discussed with patient and her daughter at bedside as well as primary team

## 2023-05-12 NOTE — PROGRESS NOTE ADULT - ATTENDING COMMENTS
83 year old [place]ic speaking female with PMH HTN, HLD, DM2, heart failure with preserved EF, CAD S/P CABG, breast CA and rectal CA who presented with shortness of breath due to CHF exacerbation. She was initially diuresed with IV Bumex but has been transitioned to PO per cardiology recs. She was on Bipap but has been weaned to nasal cannula. Despite diuresis, the patient is unable to be weaned off of oxygen. Pulmonary consult was placed and it was recommended that the patient undergo a RHC which took place 5/10 and was grossly normal. A V/Q was also recommended which is scheduled for today. The patient had choledocholithiasis s/p plastic biliary stent 1/20 and was due for repeat ERCP with stent exhange 4/26 as an outpatient however, she was admitted to the hospital. The ERCP can be done as an outpatient but the patient and family preference is to have it done while inpatient. Will discuss with GI if it can be done on minimal oxygen if cardiology and pulmonology both give clearance. Rest of plan as above.

## 2023-05-12 NOTE — PROGRESS NOTE ADULT - ASSESSMENT
seen and examined at  the bedside , feeling ok      acetaminophen     Tablet .. 650 milliGRAM(s) Oral every 6 hours PRN  albuterol/ipratropium for Nebulization 3 milliLiter(s) Nebulizer every 6 hours PRN  aluminum hydroxide/magnesium hydroxide/simethicone Suspension 30 milliLiter(s) Oral every 6 hours PRN  apixaban 2.5 milliGRAM(s) Oral two times a day  aspirin  chewable 81 milliGRAM(s) Oral daily  atorvastatin 40 milliGRAM(s) Oral at bedtime  buMETAnide 1 milliGRAM(s) Oral daily  chlorhexidine 4% Liquid 1 Application(s) Topical <User Schedule>  dextrose 5%. 1000 milliLiter(s) IV Continuous <Continuous>  dextrose 5%. 1000 milliLiter(s) IV Continuous <Continuous>  dextrose 50% Injectable 25 Gram(s) IV Push once  dextrose 50% Injectable 12.5 Gram(s) IV Push once  dextrose 50% Injectable 25 Gram(s) IV Push once  dextrose Oral Gel 15 Gram(s) Oral once PRN  fluconAZOLE   Tablet 200 milliGRAM(s) Oral every 24 hours  gabapentin 200 milliGRAM(s) Oral daily  glucagon  Injectable 1 milliGRAM(s) IntraMuscular once  insulin glargine Injectable (LANTUS) 21 Unit(s) SubCutaneous at bedtime  insulin lispro (ADMELOG) corrective regimen sliding scale   SubCutaneous at bedtime  insulin lispro (ADMELOG) corrective regimen sliding scale   SubCutaneous three times a day before meals  insulin lispro Injectable (ADMELOG) 7 Unit(s) SubCutaneous three times a day before meals  lidocaine 2% Viscous 15 milliLiter(s) Oral four times a day  metoprolol tartrate 100 milliGRAM(s) Oral every 12 hours  piperacillin/tazobactam IVPB.. 3.375 Gram(s) IV Intermittent every 12 hours  sodium bicarbonate 650 milliGRAM(s) Oral two times a day  sodium chloride 0.9% lock flush 10 milliLiter(s) IV Push every 1 hour PRN      VITAL:  T(C): , Max: 37.1 (02-03-23 @ 21:55)  T(F): , Max: 98.7 (02-03-23 @ 21:55)  HR: 79 (02-04-23 @ 10:07)  BP: 145/74 (02-04-23 @ 10:07)  BP(mean): --  RR: 18 (02-04-23 @ 10:07)  SpO2: 98% (02-04-23 @ 10:07)  Wt(kg): --    02-03-23 @ 07:01  -  02-04-23 @ 07:00  --------------------------------------------------------  IN: 980 mL / OUT: 1400 mL / NET: -420 mL    02-04-23 @ 07:01  -  02-04-23 @ 12:52  --------------------------------------------------------  IN: 400 mL / OUT: 1300 mL / NET: -900 mL        PHYSICAL EXAM:  General: NAD; Alert  HEENT:  NCAT; PERRLA  Neck: No JVD; supple  Respiratory: CTA-b/l  Cardiac: RRR s1s2  Gastrointestinal: BS+, soft, NT/ND , biliary drain in placed   Urologic: No chen  Extremities: No peripheral edema, lymphedema at baseline       LABS:                          9.6    12.04 )-----------( 452      ( 04 Feb 2023 07:24 )             31.7     Na(139)/K(4.0)/Cl(105)/HCO3(22)/BUN(34)/Cr(2.34)Glu(274)/Ca(8.9)/Mg(2.0)/PO4(4.1)    02-04 @ 07:25  Na(139)/K(3.9)/Cl(107)/HCO3(20)/BUN(39)/Cr(2.59)Glu(196)/Ca(8.7)/Mg(1.8)/PO4(4.1)    02-03 @ 09:03  Na(136)/K(3.9)/Cl(105)/HCO3(19)/BUN(45)/Cr(2.87)Glu(101)/Ca(8.9)/Mg(1.9)/PO4(4.2)    02-02 @ 07:14            ASSESSMENT/PLAN  Patient is a 80 year old F with a PMHx of HTN, HLD, T2DM, HFpEF (EF 65% X/2022), breast cancer s/p R partial mastectomy, rectal carcinoma s/p resection, CAD s/p CABG (11/2020) who presents for 1 day of progressively worsening abdominal pain associated with nausea and NBNB vomiting. 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,  nephrology awared , previous diuretic test showed minimal  function of that kidney)  non oliguric harleen likely in the view of relative  hypotension /hemodynamic --- cr still elevated   hyponatremia-- resolving/ resolved  CVS----BP soft at times, acceptable at present    GI:  acute cholecystitis --- ERCP with stent placement  1/20/23  cholecystotomy tube placement  1/25/23 perc , removal of perc 2/1/23    HIDA 2/3 negative, patent cystic duct  right abdomen TTP c/f bile peritonitis, non-operable per surgery  -metabolic acidosis --resolving  -hyperphosphatemia -much improved    RECOMMENDATION:  1)Renal: renal fxn stable   non oliguric harleen  avoid nephrotoxic medication  dose all medications for gfr ~10-15 ml/min   cont  bumex 1 mg daily , monitor resp status   cont  bicarb 650 mg bid   daily bmp,   renal diet   2)CVS: BP stable cont  metoprolol 100 mg po bid (would add holding parameters)  3) ID: on zosyn and fluconazole   4) GI on board  5) urology on board          [facility]  Office: [phone]       4 = No assist / stand by assistance

## 2023-05-12 NOTE — PROGRESS NOTE ADULT - SUBJECTIVE AND OBJECTIVE BOX
DATE OF SERVICE: 05-12-23 @ 15:17    Patient is a 83y old  Female who presents with a chief complaint of Shortness of breath (12 May 2023 14:31)      INTERVAL HISTORY: feels ok    TELEMETRY Personally reviewed: no events  	  MEDICATIONS:  buMETAnide 1 milliGRAM(s) Oral two times a day  metoprolol tartrate 100 milliGRAM(s) Oral every 12 hours  spironolactone 12.5 milliGRAM(s) Oral daily        PHYSICAL EXAM:  T(C): 36.6 (05-12-23 @ 12:59), Max: 36.8 (05-12-23 @ 08:56)  HR: 84 (05-12-23 @ 13:02) (69 - 84)  BP: 115/68 (05-12-23 @ 12:59) (101/63 - 131/59)  RR: 18 (05-12-23 @ 12:59) (17 - 18)  SpO2: 93% (05-12-23 @ 13:02) (85% - 99%)  Wt(kg): --  I&O's Summary    11 May 2023 07:01  -  12 May 2023 07:00  --------------------------------------------------------  IN: 1116 mL / OUT: 1250 mL / NET: -134 mL    12 May 2023 07:01  -  12 May 2023 15:17  --------------------------------------------------------  IN: 450 mL / OUT: 600 mL / NET: -150 mL          Appearance: In no distress	  HEENT:    PERRL, EOMI	  Cardiovascular:  S1 S2, No JVD  Respiratory: Lungs clear to auscultation	  Gastrointestinal:  Soft, Non-tender, + BS	  Vascularature:  No edema of LE  Psychiatric: Appropriate affect   Neuro: no acute focal deficits                               10.5   8.02  )-----------( 296      ( 12 May 2023 06:23 )             35.8     05-12    139  |  94<L>  |  34<H>  ----------------------------<  142<H>  4.0   |  32<H>  |  1.89<H>    Ca    9.9      12 May 2023 06:23  Phos  3.6     05-12  Mg     1.8     05-12    TPro  7.3  /  Alb  3.6  /  TBili  0.3  /  DBili  x   /  AST  17  /  ALT  15  /  AlkPhos  85  05-12        Labs personally reviewed      ASSESSMENT/PLAN: 	    Ms. Batista is a 84 YO woman with PMH of HTN, HLD, DM2, HFpEF (EF 65%), CAD s/p CABG, Breast CA s/p R partial mastectomy, rectal CA s/p resection with recent admission for acute rissa s/p ERCP w/ stent c/b gallbladder perf w/ perc rissa and recurrent SVT, presenting for shortness of breath. Denies CP, palpitations, orthopnea or syncope. Follows as OP with Dr. Hagan.    Problem/Plan -1  Problem: Acute on Chronic Diastolic CHF  - POCUS suggestive of pulm edema  - CXR with no pulm edema or effusion. Prior CT one month ago with moderate effusions and pulm edema  - proBNP 2505 --> 1536  - Prior TTE 3/23 shows preserved EF, hyperdynamic LV function, basal inferoseptum hypokinesis, decreased RV systolic function severe pulm pressures, B/L pleural effusions  - Repeat TTE 5/6 Normal left ventricular cavity size. The left ventricular wall thickness is normal. The left ventricular systolic function is normal, EF 62%. No RWA. Mod PH. PAS pressure 56 mmHg.  - Strict I&Os, daily weights  - Reports SOB, still requires supplemental O2 intermittently   - Continue Bumex 1mg BID PO   - Initiated Romayor 25mg daily   - RHC with normal PCWP and CI, moderate-severe pulm HTN  - V/Q no PE    Problem/Plan -2  Problem: CAD   - s/p CABG 2020  - ECG with no ischemia noted  - Trop 21  - c/w ASA, metoprolol and statin  - Prior TTE 3/23 shows preserved EF, hyperdynamic LV function, basal inferoseptal hypokinesis, decreased RV systolic function severe pulm pressures   - 5/5 c/o CP/SOB: trop/CKMB/ECG wnl.     Problem/Plan -3  Problem: hx of Recurrent SVT  - c/w Metoprolol 100mg PO BID  - cont to monitor on tele    Problem/Plan -4  Problem: Atrial Fibrillation  - Patient with n/o A-flutter on previous admission  - c/w Metoprolol   - eliquis 2.5mg PO BID on hold   - c/w Heparin gtt    Problem/Plan- 5  Problem: Hx of Cholecystitis, preop Risk Stratification   - Euvolemic, optimized from cardiac standpoint  - elevated risk for mod risk ERCP, no cardiac contraindication to proceed           Sergey Winchester DO [facility]  Cardiovascular Medicine  [address] [place] [address], [address]  Office: [phone]  Available via Text/call on Microsoft Teams

## 2023-05-12 NOTE — PROGRESS NOTE ADULT - SUBJECTIVE AND OBJECTIVE BOX
NEPHROLOGY     no event over night   no distress   MEDICATIONS  (STANDING):  albuterol/ipratropium for Nebulization 3 milliLiter(s) Nebulizer every 6 hours  aspirin  chewable 81 milliGRAM(s) Oral daily  atorvastatin 40 milliGRAM(s) Oral at bedtime  buMETAnide Injectable 1 milliGRAM(s) IV Push two times a day  chlorhexidine 2% Cloths 1 Application(s) Topical <User Schedule>  darbepoetin Injectable ViaL 40 MICROGram(s) SubCutaneous every 7 days  dextrose 5%. 1000 milliLiter(s) (100 mL/Hr) IV Continuous <Continuous>  dextrose 5%. 1000 milliLiter(s) (50 mL/Hr) IV Continuous <Continuous>  dextrose 50% Injectable 25 Gram(s) IV Push once  dextrose 50% Injectable 12.5 Gram(s) IV Push once  dextrose 50% Injectable 25 Gram(s) IV Push once  ferrous    sulfate 325 milliGRAM(s) Oral daily  gabapentin 200 milliGRAM(s) Oral daily  glucagon  Injectable 1 milliGRAM(s) IntraMuscular once  heparin  Infusion. 1200 Unit(s)/Hr (12 mL/Hr) IV Continuous <Continuous>  insulin glargine Injectable (LANTUS) 10 Unit(s) SubCutaneous at bedtime  insulin lispro (ADMELOG) corrective regimen sliding scale   SubCutaneous at bedtime  insulin lispro (ADMELOG) corrective regimen sliding scale   SubCutaneous three times a day before meals  insulin lispro Injectable (ADMELOG) 5 Unit(s) SubCutaneous three times a day before meals  metoprolol tartrate 100 milliGRAM(s) Oral every 12 hours  Nephro-ashley 1 Tablet(s) Oral daily  pantoprazole    Tablet 40 milliGRAM(s) Oral before breakfast  polyethylene glycol 3350 17 Gram(s) Oral daily  senna 2 Tablet(s) Oral at bedtime  sodium bicarbonate 650 milliGRAM(s) Oral daily  spironolactone 25 milliGRAM(s) Oral daily    VITALS:  T(C): , Max: 36.9 (05-06-23 @ 11:17)  T(F): , Max: 98.5 (05-06-23 @ 11:17)  HR: 76 (05-07-23 @ 04:52)  BP: 124/76 (05-07-23 @ 04:52)  RR: 18 (05-07-23 @ 04:52)  SpO2: 90% (05-07-23 @ 04:52)    I and O's:    05-06 @ 07:01  -  05-07 @ 07:00  --------------------------------------------------------  IN: 480 mL / OUT: 1000 mL / NET: -520 mL    PHYSICAL EXAM:  General: Alerted, oriented  HEENT: MMM  Lungs:CTA-b/l  Heart: RRR S1S2  Abdomen: Soft, NTND  Ext: + le edema  : no [place]    LABS:                        9.2    10.04 )-----------( 265      ( 07 May 2023 06:49 )             31.7     05-07    139  |  96  |  33<H>  ----------------------------<  251<H>  4.1   |  31  |  1.91<H>    Ca    9.5      07 May 2023 06:49  Phos  3.6     05-07  Mg     1.8     05-07    TPro  7.0  /  Alb  3.6  /  TBili  0.4  /  DBili  x   /  AST  17  /  ALT  15  /  AlkPhos  90  05-07

## 2023-05-13 LAB
ALBUMIN SERPL ELPH-MCNC: 3.6 G/DL — SIGNIFICANT CHANGE UP (ref 3.3–5)
ALP SERPL-CCNC: 78 U/L — SIGNIFICANT CHANGE UP (ref 40–120)
ALT FLD-CCNC: 13 U/L — SIGNIFICANT CHANGE UP (ref 10–45)
ANION GAP SERPL CALC-SCNC: 13 MMOL/L — SIGNIFICANT CHANGE UP (ref 5–17)
APTT BLD: 45.3 SEC — HIGH (ref 27.5–35.5)
APTT BLD: 85.8 SEC — HIGH (ref 27.5–35.5)
APTT BLD: 90.9 SEC — HIGH (ref 27.5–35.5)
AST SERPL-CCNC: 16 U/L — SIGNIFICANT CHANGE UP (ref 10–40)
BASOPHILS # BLD AUTO: 0 K/UL — SIGNIFICANT CHANGE UP (ref 0–0.2)
BASOPHILS NFR BLD AUTO: 0 % — SIGNIFICANT CHANGE UP (ref 0–2)
BILIRUB SERPL-MCNC: 0.2 MG/DL — SIGNIFICANT CHANGE UP (ref 0.2–1.2)
BUN SERPL-MCNC: 40 MG/DL — HIGH (ref 7–23)
CALCIUM SERPL-MCNC: 9.5 MG/DL — SIGNIFICANT CHANGE UP (ref 8.4–10.5)
CHLORIDE SERPL-SCNC: 94 MMOL/L — LOW (ref 96–108)
CO2 SERPL-SCNC: 30 MMOL/L — SIGNIFICANT CHANGE UP (ref 22–31)
CREAT SERPL-MCNC: 1.9 MG/DL — HIGH (ref 0.5–1.3)
EGFR: 26 ML/MIN/1.73M2 — LOW
EOSINOPHIL # BLD AUTO: 0.22 K/UL — SIGNIFICANT CHANGE UP (ref 0–0.5)
EOSINOPHIL NFR BLD AUTO: 2.6 % — SIGNIFICANT CHANGE UP (ref 0–6)
GIANT PLATELETS BLD QL SMEAR: PRESENT — SIGNIFICANT CHANGE UP
GLUCOSE BLDC GLUCOMTR-MCNC: 158 MG/DL — HIGH (ref 70–99)
GLUCOSE BLDC GLUCOMTR-MCNC: 195 MG/DL — HIGH (ref 70–99)
GLUCOSE BLDC GLUCOMTR-MCNC: 199 MG/DL — HIGH (ref 70–99)
GLUCOSE BLDC GLUCOMTR-MCNC: 216 MG/DL — HIGH (ref 70–99)
GLUCOSE BLDC GLUCOMTR-MCNC: 230 MG/DL — HIGH (ref 70–99)
GLUCOSE SERPL-MCNC: 158 MG/DL — HIGH (ref 70–99)
HCT VFR BLD CALC: 32.3 % — LOW (ref 34.5–45)
HGB BLD-MCNC: 9.4 G/DL — LOW (ref 11.5–15.5)
LYMPHOCYTES # BLD AUTO: 0.72 K/UL — LOW (ref 1–3.3)
LYMPHOCYTES # BLD AUTO: 8.7 % — LOW (ref 13–44)
MAGNESIUM SERPL-MCNC: 1.8 MG/DL — SIGNIFICANT CHANGE UP (ref 1.6–2.6)
MANUAL SMEAR VERIFICATION: SIGNIFICANT CHANGE UP
MCHC RBC-ENTMCNC: 27.7 PG — SIGNIFICANT CHANGE UP (ref 27–34)
MCHC RBC-ENTMCNC: 29.1 GM/DL — LOW (ref 32–36)
MCV RBC AUTO: 95.3 FL — SIGNIFICANT CHANGE UP (ref 80–100)
MONOCYTES # BLD AUTO: 0.51 K/UL — SIGNIFICANT CHANGE UP (ref 0–0.9)
MONOCYTES NFR BLD AUTO: 6.1 % — SIGNIFICANT CHANGE UP (ref 2–14)
NEUTROPHILS # BLD AUTO: 6.88 K/UL — SIGNIFICANT CHANGE UP (ref 1.8–7.4)
NEUTROPHILS NFR BLD AUTO: 82.6 % — HIGH (ref 43–77)
PHOSPHATE SERPL-MCNC: 4.3 MG/DL — SIGNIFICANT CHANGE UP (ref 2.5–4.5)
PLAT MORPH BLD: ABNORMAL
PLATELET # BLD AUTO: 308 K/UL — SIGNIFICANT CHANGE UP (ref 150–400)
POTASSIUM SERPL-MCNC: 4.1 MMOL/L — SIGNIFICANT CHANGE UP (ref 3.5–5.3)
POTASSIUM SERPL-SCNC: 4.1 MMOL/L — SIGNIFICANT CHANGE UP (ref 3.5–5.3)
PROT SERPL-MCNC: 7.1 G/DL — SIGNIFICANT CHANGE UP (ref 6–8.3)
RBC # BLD: 3.39 M/UL — LOW (ref 3.8–5.2)
RBC # FLD: 20.1 % — HIGH (ref 10.3–14.5)
RBC BLD AUTO: SIGNIFICANT CHANGE UP
SODIUM SERPL-SCNC: 137 MMOL/L — SIGNIFICANT CHANGE UP (ref 135–145)
WBC # BLD: 8.33 K/UL — SIGNIFICANT CHANGE UP (ref 3.8–10.5)
WBC # FLD AUTO: 8.33 K/UL — SIGNIFICANT CHANGE UP (ref 3.8–10.5)

## 2023-05-13 PROCEDURE — 99232 SBSQ HOSP IP/OBS MODERATE 35: CPT | Mod: GC

## 2023-05-13 RX ORDER — MAGNESIUM OXIDE 400 MG ORAL TABLET 241.3 MG
400 TABLET ORAL ONCE
Refills: 0 | Status: COMPLETED | OUTPATIENT
Start: 2023-05-13 | End: 2023-05-13

## 2023-05-13 RX ADMIN — Medication 325 MILLIGRAM(S): at 11:36

## 2023-05-13 RX ADMIN — PANTOPRAZOLE SODIUM 40 MILLIGRAM(S): 20 TABLET, DELAYED RELEASE ORAL at 07:13

## 2023-05-13 RX ADMIN — SPIRONOLACTONE 12.5 MILLIGRAM(S): 25 TABLET, FILM COATED ORAL at 05:14

## 2023-05-13 RX ADMIN — SENNA PLUS 2 TABLET(S): 8.6 TABLET ORAL at 22:31

## 2023-05-13 RX ADMIN — Medication 1: at 08:12

## 2023-05-13 RX ADMIN — MAGNESIUM OXIDE 400 MG ORAL TABLET 400 MILLIGRAM(S): 241.3 TABLET ORAL at 11:37

## 2023-05-13 RX ADMIN — INSULIN GLARGINE 18 UNIT(S): 100 INJECTION, SOLUTION SUBCUTANEOUS at 22:30

## 2023-05-13 RX ADMIN — Medication 3 MILLILITER(S): at 11:36

## 2023-05-13 RX ADMIN — Medication 3 MILLILITER(S): at 17:22

## 2023-05-13 RX ADMIN — BUMETANIDE 1 MILLIGRAM(S): 0.25 INJECTION INTRAMUSCULAR; INTRAVENOUS at 15:28

## 2023-05-13 RX ADMIN — CHLORHEXIDINE GLUCONATE 1 APPLICATION(S): 213 SOLUTION TOPICAL at 06:04

## 2023-05-13 RX ADMIN — Medication 1: at 12:18

## 2023-05-13 RX ADMIN — Medication 3 MILLILITER(S): at 22:31

## 2023-05-13 RX ADMIN — Medication 1: at 17:22

## 2023-05-13 RX ADMIN — Medication 100 MILLIGRAM(S): at 17:21

## 2023-05-13 RX ADMIN — HEPARIN SODIUM 1400 UNIT(S)/HR: 5000 INJECTION INTRAVENOUS; SUBCUTANEOUS at 19:19

## 2023-05-13 RX ADMIN — POLYETHYLENE GLYCOL 3350 17 GRAM(S): 17 POWDER, FOR SOLUTION ORAL at 11:36

## 2023-05-13 RX ADMIN — Medication 7 UNIT(S): at 17:23

## 2023-05-13 RX ADMIN — Medication 7 UNIT(S): at 08:13

## 2023-05-13 RX ADMIN — Medication 100 MILLIGRAM(S): at 05:14

## 2023-05-13 RX ADMIN — Medication 7 UNIT(S): at 12:18

## 2023-05-13 RX ADMIN — ATORVASTATIN CALCIUM 40 MILLIGRAM(S): 80 TABLET, FILM COATED ORAL at 22:31

## 2023-05-13 RX ADMIN — GABAPENTIN 200 MILLIGRAM(S): 400 CAPSULE ORAL at 11:36

## 2023-05-13 RX ADMIN — Medication 3 MILLILITER(S): at 05:14

## 2023-05-13 RX ADMIN — HEPARIN SODIUM 1200 UNIT(S)/HR: 5000 INJECTION INTRAVENOUS; SUBCUTANEOUS at 07:12

## 2023-05-13 RX ADMIN — Medication 81 MILLIGRAM(S): at 11:36

## 2023-05-13 RX ADMIN — HEPARIN SODIUM 1400 UNIT(S)/HR: 5000 INJECTION INTRAVENOUS; SUBCUTANEOUS at 07:40

## 2023-05-13 RX ADMIN — Medication 1 TABLET(S): at 11:36

## 2023-05-13 RX ADMIN — BUMETANIDE 1 MILLIGRAM(S): 0.25 INJECTION INTRAMUSCULAR; INTRAVENOUS at 05:14

## 2023-05-13 RX ADMIN — HEPARIN SODIUM 1400 UNIT(S)/HR: 5000 INJECTION INTRAVENOUS; SUBCUTANEOUS at 22:24

## 2023-05-13 RX ADMIN — HEPARIN SODIUM 1400 UNIT(S)/HR: 5000 INJECTION INTRAVENOUS; SUBCUTANEOUS at 15:28

## 2023-05-13 NOTE — PROGRESS NOTE ADULT - ASSESSMENT
Ms. Batista is a 84 YO woman with PMH of HTN, HLD, DM2, HFpEF (EF 65%), CAD s/p CABG, Breast CA s/p R partial mastectomy, rectal CA s/p resection with previous admission for acute rissa s/p ERCP w/ stent c/b gallbladder perf w/ perc rissa and recurrent SVT, and recent admission for CHF exacerbation presenting for shortness of breath.----out patient nephrologist Dr Kan .    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 pt is  euvolemic   hypervolemia - pt is  euvolemic   Electrolytes - controlled  Anemia- -ckd related  also consistent  iron deficiency , tsat 7, ferritin 28---sp iv venofer  5 doses completed on 4/3023   GI- ERCP on hold due to volume status ----- improved euvolemic   met alkalosis --- in the view of diuretic --- cont to monitor   a fib --on heparin   CAD;; EF 62  %  , HRC -- -- 5/10/23      RECOMMENDATIONS  - Give magnesium oxide 400 mg x1  - Continue Bumex 1 mg PO x  bid  - Cont spironolactone 12.5mg po daily ( anti ryan + diuretic )   - Keep k ~4 and mag 2  - Dose all medications for GFR ~25 ml/min  - Monitor I/Os and daily weights---- pt is  euvolemic   -  cont daily oral iron , weekly aranesp 40 mcg SQ .          Samanta Prasad NP  [facility]  Office: [phone]     Ms. Batista is a 84 YO woman with PMH of HTN, HLD, DM2, HFpEF (EF 65%), CAD s/p CABG, Breast CA s/p R partial mastectomy, rectal CA s/p resection with previous admission for acute rissa s/p ERCP w/ stent c/b gallbladder perf w/ perc rissa and recurrent SVT, and recent admission for CHF exacerbation presenting for shortness of breath.----out patient nephrologist Dr Kan .    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 pt is  euvolemic   hypervolemia - pt is  euvolemic   Electrolytes - controlled  Anemia- -ckd related  also consistent  iron deficiency , tsat 7, ferritin 28---sp iv venofer  5 doses completed on 4/3023   GI- ERCP on hold due to volume status ----- improved euvolemic   met alkalosis --- in the view of diuretic --- cont to monitor   a fib --on heparin   CAD;; EF 62  %  , HRC -- -- 5/10/23      RECOMMENDATIONS  - Give magnesium oxide 400 mg x1  - Continue Bumex 1 mg PO x  bid  - Cont spironolactone 12.5mg po daily ( anti ryan + diuretic )   - Keep k ~4 and mag 2  - Dose all medications for GFR ~25 ml/min  - Monitor I/Os and daily weights---- pt is  euvolemic   -  cont daily oral iron , weekly Aranesp 40 mcg SQ .          Samanta Prasad NP  [facility]  Office: [phone]

## 2023-05-13 NOTE — PROGRESS NOTE ADULT - PROBLEM SELECTOR PLAN 3
- ERCP was planned for outpatient 4/26  - GI aware, will await improvement in respiratory function  - once pulm and cards documents clearance, will reach out to GI

## 2023-05-13 NOTE — PROGRESS NOTE ADULT - SUBJECTIVE AND OBJECTIVE BOX
Patient is a 83y old  Female who presents with a chief complaint of Shortness of breath (12 May 2023 15:17)      SUBJECTIVE / OVERNIGHT EVENTS:          MEDICATIONS  (STANDING):  albuterol/ipratropium for Nebulization 3 milliLiter(s) Nebulizer every 6 hours  aspirin  chewable 81 milliGRAM(s) Oral daily  atorvastatin 40 milliGRAM(s) Oral at bedtime  buMETAnide 1 milliGRAM(s) Oral two times a day  chlorhexidine 2% Cloths 1 Application(s) Topical <User Schedule>  darbepoetin Injectable ViaL 40 MICROGram(s) SubCutaneous every 7 days  dextrose 5%. 1000 milliLiter(s) (100 mL/Hr) IV Continuous <Continuous>  dextrose 5%. 1000 milliLiter(s) (50 mL/Hr) IV Continuous <Continuous>  dextrose 50% Injectable 25 Gram(s) IV Push once  dextrose 50% Injectable 12.5 Gram(s) IV Push once  dextrose 50% Injectable 25 Gram(s) IV Push once  ferrous    sulfate 325 milliGRAM(s) Oral daily  gabapentin 200 milliGRAM(s) Oral daily  glucagon  Injectable 1 milliGRAM(s) IntraMuscular once  heparin  Infusion. 1200 Unit(s)/Hr (12 mL/Hr) IV Continuous <Continuous>  insulin glargine Injectable (LANTUS) 18 Unit(s) SubCutaneous at bedtime  insulin lispro (ADMELOG) corrective regimen sliding scale   SubCutaneous three times a day before meals  insulin lispro (ADMELOG) corrective regimen sliding scale   SubCutaneous at bedtime  insulin lispro Injectable (ADMELOG) 7 Unit(s) SubCutaneous three times a day with meals  metoprolol tartrate 100 milliGRAM(s) Oral every 12 hours  Nephro-ashley 1 Tablet(s) Oral daily  pantoprazole    Tablet 40 milliGRAM(s) Oral before breakfast  polyethylene glycol 3350 17 Gram(s) Oral daily  senna 2 Tablet(s) Oral at bedtime  spironolactone 12.5 milliGRAM(s) Oral daily    MEDICATIONS  (PRN):  acetaminophen     Tablet .. 650 milliGRAM(s) Oral every 6 hours PRN Temp greater or equal to 38C (100.4F), Mild Pain (1 - 3)  dextrose Oral Gel 15 Gram(s) Oral once PRN Blood Glucose LESS THAN 70 milliGRAM(s)/deciliter  melatonin 3 milliGRAM(s) Oral at bedtime PRN Insomnia      Vital Signs Last 24 Hrs  T(C): 36.6 (13 May 2023 04:24), Max: 36.8 (12 May 2023 08:56)  T(F): 97.8 (13 May 2023 04:24), Max: 98.3 (12 May 2023 08:56)  HR: 76 (13 May 2023 04:24) (75 - 84)  BP: 110/60 (13 May 2023 04:24) (103/67 - 115/68)  BP(mean): --  RR: 18 (13 May 2023 04:24) (17 - 18)  SpO2: 97% (13 May 2023 04:24) (85% - 99%)    Parameters below as of 13 May 2023 04:24  Patient On (Oxygen Delivery Method): nasal cannula  O2 Flow (L/min): 2        PHYSICAL EXAM  GENERAL: NAD, well-developed  HEAD:  Atraumatic, Normocephalic  EYES: EOMI, PERRLA, conjunctiva and sclera clear  NECK: Supple, No JVD  CHEST/LUNG: Clear to auscultation bilaterally; No wheeze  HEART: Regular rate and rhythm; No murmurs, rubs, or gallops  ABDOMEN: Soft, Nontender, Nondistended; Bowel sounds present  EXTREMITIES:  2+ Peripheral Pulses, No clubbing, cyanosis, or edema  PSYCH: AAOx3  SKIN: No rashes or lesions    CAPILLARY BLOOD GLUCOSE      POCT Blood Glucose.: 356 mg/dL (12 May 2023 21:16)  POCT Blood Glucose.: 396 mg/dL (12 May 2023 16:56)  POCT Blood Glucose.: 270 mg/dL (12 May 2023 12:00)  POCT Blood Glucose.: 195 mg/dL (12 May 2023 07:34)    I&O's Summary    12 May 2023 07:01  -  13 May 2023 07:00  --------------------------------------------------------  IN: 450 mL / OUT: 600 mL / NET: -150 mL        LABS:                        10.5   8.02  )-----------( 296      ( 12 May 2023 06:23 )             35.8     05-12    139  |  94<L>  |  34<H>  ----------------------------<  142<H>  4.0   |  32<H>  |  1.89<H>    Ca    9.9      12 May 2023 06:23  Phos  3.6     05-12  Mg     1.8     05-12    TPro  7.3  /  Alb  3.6  /  TBili  0.3  /  DBili  x   /  AST  17  /  ALT  15  /  AlkPhos  85  05-12    PTT - ( 12 May 2023 06:23 )  PTT:78.3 sec          RADIOLOGY & ADDITIONAL TESTS:     MICROBIOLOGY:    ANTIMICROBIALS:    CONSULTS: Patient is a 83y old  Female who presents with a chief complaint of Shortness of breath (12 May 2023 15:17)      SUBJECTIVE / OVERNIGHT EVENTS:    NAEO.  (Jasiel #[id number]), feeling improved with regards to her breathing. Tolerating breakfast. Denies chest pain, shortness of breath.       MEDICATIONS  (STANDING):  albuterol/ipratropium for Nebulization 3 milliLiter(s) Nebulizer every 6 hours  aspirin  chewable 81 milliGRAM(s) Oral daily  atorvastatin 40 milliGRAM(s) Oral at bedtime  buMETAnide 1 milliGRAM(s) Oral two times a day  chlorhexidine 2% Cloths 1 Application(s) Topical <User Schedule>  darbepoetin Injectable ViaL 40 MICROGram(s) SubCutaneous every 7 days  dextrose 5%. 1000 milliLiter(s) (100 mL/Hr) IV Continuous <Continuous>  dextrose 5%. 1000 milliLiter(s) (50 mL/Hr) IV Continuous <Continuous>  dextrose 50% Injectable 25 Gram(s) IV Push once  dextrose 50% Injectable 12.5 Gram(s) IV Push once  dextrose 50% Injectable 25 Gram(s) IV Push once  ferrous    sulfate 325 milliGRAM(s) Oral daily  gabapentin 200 milliGRAM(s) Oral daily  glucagon  Injectable 1 milliGRAM(s) IntraMuscular once  heparin  Infusion. 1200 Unit(s)/Hr (12 mL/Hr) IV Continuous <Continuous>  insulin glargine Injectable (LANTUS) 18 Unit(s) SubCutaneous at bedtime  insulin lispro (ADMELOG) corrective regimen sliding scale   SubCutaneous three times a day before meals  insulin lispro (ADMELOG) corrective regimen sliding scale   SubCutaneous at bedtime  insulin lispro Injectable (ADMELOG) 7 Unit(s) SubCutaneous three times a day with meals  metoprolol tartrate 100 milliGRAM(s) Oral every 12 hours  Nephro-ashley 1 Tablet(s) Oral daily  pantoprazole    Tablet 40 milliGRAM(s) Oral before breakfast  polyethylene glycol 3350 17 Gram(s) Oral daily  senna 2 Tablet(s) Oral at bedtime  spironolactone 12.5 milliGRAM(s) Oral daily    MEDICATIONS  (PRN):  acetaminophen     Tablet .. 650 milliGRAM(s) Oral every 6 hours PRN Temp greater or equal to 38C (100.4F), Mild Pain (1 - 3)  dextrose Oral Gel 15 Gram(s) Oral once PRN Blood Glucose LESS THAN 70 milliGRAM(s)/deciliter  melatonin 3 milliGRAM(s) Oral at bedtime PRN Insomnia      Vital Signs Last 24 Hrs  T(C): 36.6 (13 May 2023 04:24), Max: 36.8 (12 May 2023 08:56)  T(F): 97.8 (13 May 2023 04:24), Max: 98.3 (12 May 2023 08:56)  HR: 76 (13 May 2023 04:24) (75 - 84)  BP: 110/60 (13 May 2023 04:24) (103/67 - 115/68)  BP(mean): --  RR: 18 (13 May 2023 04:24) (17 - 18)  SpO2: 97% (13 May 2023 04:24) (85% - 99%)    Parameters below as of 13 May 2023 04:24  Patient On (Oxygen Delivery Method): nasal cannula  O2 Flow (L/min): 2        PHYSICAL EXAM  GENERAL: Sitting in bed in no acute distress  EYES: Conjunctiva noninjected or pale, sclera anicteric  HENT: NC/AT, moist mucous membranes  NECK: Supple, trachea midline.  LUNG: Mild inspiratory crackles b/l. On 2.5L nasal cannula, titrated down to 1L  CV: RRR, Pulses- Radial: 2+ b/l.   ABDOMEN: Nondistended, nontender  MSK: No visible deformities, nontender extremities. R arm with chronic lymphedema, mild pitting edema in LEs b/l, improved  SKIN: No rashes, bruises  NEURO: AAOx4 (to person, place, time, event), no tremor      CAPILLARY BLOOD GLUCOSE      POCT Blood Glucose.: 356 mg/dL (12 May 2023 21:16)  POCT Blood Glucose.: 396 mg/dL (12 May 2023 16:56)  POCT Blood Glucose.: 270 mg/dL (12 May 2023 12:00)  POCT Blood Glucose.: 195 mg/dL (12 May 2023 07:34)    I&O's Summary    12 May 2023 07:01  -  13 May 2023 07:00  --------------------------------------------------------  IN: 450 mL / OUT: 600 mL / NET: -150 mL        LABS:                        10.5   8.02  )-----------( 296      ( 12 May 2023 06:23 )             35.8     05-12    139  |  94<L>  |  34<H>  ----------------------------<  142<H>  4.0   |  32<H>  |  1.89<H>    Ca    9.9      12 May 2023 06:23  Phos  3.6     05-12  Mg     1.8     05-12    TPro  7.3  /  Alb  3.6  /  TBili  0.3  /  DBili  x   /  AST  17  /  ALT  15  /  AlkPhos  85  05-12    PTT - ( 12 May 2023 06:23 )  PTT:78.3 sec          RADIOLOGY & ADDITIONAL TESTS:     MICROBIOLOGY:    ANTIMICROBIALS:    CONSULTS:

## 2023-05-13 NOTE — PROGRESS NOTE ADULT - ASSESSMENT
Ms. Batista is a 84 YO woman with PMH of HTN, HLD, T2DM, HFpEF (EF 65%), CAD s/p CABG, Breast CA s/p R partial mastectomy, rectal CA s/p resection with recent admission for acute rissa s/p ERCP w/ stent c/b gallbladder perf w/ perc rissa and recurrent SVT, presenting for shortness of breath, admitted for HF exacerbation. Remains on O2 despite continued diuresis.      83F PMH of HTN, HLD, T2DM, HFpEF (EF 65%), CAD s/p CABG, Breast CA s/p R partial mastectomy, rectal CA s/p resection with recent admission for acute rissa s/p ERCP w/ stent c/b gallbladder perf w/ perc rissa and recurrent SVT, presenting for shortness of breath, admitted for HF exacerbation. Remains on O2 despite continued diuresis.

## 2023-05-13 NOTE — PROGRESS NOTE ADULT - SUBJECTIVE AND OBJECTIVE BOX
Nephrology Progress Note    Patient is a 83y female in bed comfortable. Breathing stable.    Allergies:  CT scan dye (Hives)  penicillin (Unknown)    Hospital Medications:   MEDICATIONS  (STANDING):  albuterol/ipratropium for Nebulization 3 milliLiter(s) Nebulizer every 6 hours  aspirin  chewable 81 milliGRAM(s) Oral daily  atorvastatin 40 milliGRAM(s) Oral at bedtime  buMETAnide 1 milliGRAM(s) Oral two times a day  chlorhexidine 2% Cloths 1 Application(s) Topical <User Schedule>  darbepoetin Injectable ViaL 40 MICROGram(s) SubCutaneous every 7 days  dextrose 5%. 1000 milliLiter(s) (50 mL/Hr) IV Continuous <Continuous>  dextrose 5%. 1000 milliLiter(s) (100 mL/Hr) IV Continuous <Continuous>  dextrose 50% Injectable 25 Gram(s) IV Push once  dextrose 50% Injectable 25 Gram(s) IV Push once  dextrose 50% Injectable 12.5 Gram(s) IV Push once  ferrous    sulfate 325 milliGRAM(s) Oral daily  gabapentin 200 milliGRAM(s) Oral daily  glucagon  Injectable 1 milliGRAM(s) IntraMuscular once  heparin  Infusion. 1200 Unit(s)/Hr (12 mL/Hr) IV Continuous <Continuous>  insulin glargine Injectable (LANTUS) 18 Unit(s) SubCutaneous at bedtime  insulin lispro (ADMELOG) corrective regimen sliding scale   SubCutaneous three times a day before meals  insulin lispro (ADMELOG) corrective regimen sliding scale   SubCutaneous at bedtime  insulin lispro Injectable (ADMELOG) 7 Unit(s) SubCutaneous three times a day with meals  metoprolol tartrate 100 milliGRAM(s) Oral every 12 hours  Nephro-ashley 1 Tablet(s) Oral daily  pantoprazole    Tablet 40 milliGRAM(s) Oral before breakfast  polyethylene glycol 3350 17 Gram(s) Oral daily  senna 2 Tablet(s) Oral at bedtime  spironolactone 12.5 milliGRAM(s) Oral daily    VITALS:  T(F): 97.8 (23 @ 04:24), Max: 98.2 (23 @ 20:17)  HR: 76 (23 @ 04:24)  BP: 110/60 (23 @ 04:24)  RR: 18 (23 @ 04:24)  SpO2: 97% (23 @ 04:24)       @ 07:01  -   @ 07:00  --------------------------------------------------------  IN: 1116 mL / OUT: 1250 mL / NET: -134 mL     @ 07:  -   @ 07:00  --------------------------------------------------------  IN: 450 mL / OUT: 600 mL / NET: -150 mL        PHYSICAL EXAM:  Constitutional: NAD  HEENT: anicteric sclera, oropharynx clear, MMM  Neck: No JVD  Respiratory: CTAB, no wheezes, rales or rhonchi  Cardiovascular: S1, S2, RRR  Gastrointestinal: BS+, soft, NT/ND  Extremities: Rt arm swelling  Neurological: A/O x 3, no focal deficits  Psychiatric: Normal mood, normal affect  : No CVA tenderness. No chen.   Skin: + ecchymosis      LABS:      137  |  94<L>  |  40<H>  ----------------------------<  158<H>  4.1   |  30  |  1.90<H>    Ca    9.5      13 May 2023 06:56  Phos  4.3       Mg     1.8         TPro  7.1  /  Alb  3.6  /  TBili  0.2  /  DBili      /  AST  16  /  ALT  13  /  AlkPhos  78                            9.4    8.33  )-----------( 308      ( 13 May 2023 06:58 )             32.3       Urine Studies:  Urinalysis Basic - ( 08 May 2023 15:14 )    Color: Yellow / Appearance: Slightly Turbid / S.012 / pH:   Gluc:  / Ketone: Negative  / Bili: Negative / Urobili: Negative   Blood:  / Protein: Trace / Nitrite: Negative   Leuk Esterase: Large / RBC: 6 /hpf /  /HPF   Sq Epi:  / Non Sq Epi:  / Bacteria: Many           Nephrology Progress Note    Patient is a 83y female in bed comfortable. Breathing stable.    Allergies:  CT scan dye (Hives)  penicillin (Unknown)    Hospital Medications:   MEDICATIONS  (STANDING):  albuterol/ipratropium for Nebulization 3 milliLiter(s) Nebulizer every 6 hours  aspirin  chewable 81 milliGRAM(s) Oral daily  atorvastatin 40 milliGRAM(s) Oral at bedtime  buMETAnide 1 milliGRAM(s) Oral two times a day  chlorhexidine 2% Cloths 1 Application(s) Topical <User Schedule>  darbepoetin Injectable ViaL 40 MICROGram(s) SubCutaneous every 7 days  dextrose 5%. 1000 milliLiter(s) (50 mL/Hr) IV Continuous <Continuous>  dextrose 5%. 1000 milliLiter(s) (100 mL/Hr) IV Continuous <Continuous>  dextrose 50% Injectable 25 Gram(s) IV Push once  dextrose 50% Injectable 25 Gram(s) IV Push once  dextrose 50% Injectable 12.5 Gram(s) IV Push once  ferrous    sulfate 325 milliGRAM(s) Oral daily  gabapentin 200 milliGRAM(s) Oral daily  glucagon  Injectable 1 milliGRAM(s) IntraMuscular once  heparin  Infusion. 1200 Unit(s)/Hr (12 mL/Hr) IV Continuous <Continuous>  insulin glargine Injectable (LANTUS) 18 Unit(s) SubCutaneous at bedtime  insulin lispro (ADMELOG) corrective regimen sliding scale   SubCutaneous three times a day before meals  insulin lispro (ADMELOG) corrective regimen sliding scale   SubCutaneous at bedtime  insulin lispro Injectable (ADMELOG) 7 Unit(s) SubCutaneous three times a day with meals  metoprolol tartrate 100 milliGRAM(s) Oral every 12 hours  Nephro-ashley 1 Tablet(s) Oral daily  pantoprazole    Tablet 40 milliGRAM(s) Oral before breakfast  polyethylene glycol 3350 17 Gram(s) Oral daily  senna 2 Tablet(s) Oral at bedtime  spironolactone 12.5 milliGRAM(s) Oral daily    VITALS:  T(F): 97.8 (23 @ 04:24), Max: 98.2 (23 @ 20:17)  HR: 76 (23 @ 04:24)  BP: 110/60 (23 @ 04:24)  RR: 18 (23 @ 04:24)  SpO2: 97% (23 @ 04:24)       @ 07:01  -   @ 07:00  --------------------------------------------------------  IN: 1116 mL / OUT: 1250 mL / NET: -134 mL     @ 07:  -   @ 07:00  --------------------------------------------------------  IN: 450 mL / OUT: 600 mL / NET: -150 mL        PHYSICAL EXAM:  Constitutional: NAD  HEENT: anicteric sclera, oropharynx clear, MMM  Neck: No JVD  Respiratory: CTAB, no wheezes, rales or rhonchi  Cardiovascular: S1, S2, RRR  Gastrointestinal: BS+, soft, NT/ND  Extremities: + edema  Neurological: A/O x 3, no focal deficits  Psychiatric: Normal mood, normal affect  : No CVA tenderness. No chen.   Skin: no rash      LABS:      137  |  94<L>  |  40<H>  ----------------------------<  158<H>  4.1   |  30  |  1.90<H>    Ca    9.5      13 May 2023 06:56  Phos  4.3       Mg     1.8         TPro  7.1  /  Alb  3.6  /  TBili  0.2  /  DBili      /  AST  16  /  ALT  13  /  AlkPhos  78                            9.4    8.33  )-----------( 308      ( 13 May 2023 06:58 )             32.3       Urine Studies:  Urinalysis Basic - ( 08 May 2023 15:14 )    Color: Yellow / Appearance: Slightly Turbid / S.012 / pH:   Gluc:  / Ketone: Negative  / Bili: Negative / Urobili: Negative   Blood:  / Protein: Trace / Nitrite: Negative   Leuk Esterase: Large / RBC: 6 /hpf /  /HPF   Sq Epi:  / Non Sq Epi:  / Bacteria: Many

## 2023-05-13 NOTE — PROGRESS NOTE ADULT - ATTENDING COMMENTS
83F PMH HTN, HLD, DM2, heart failure with preserved EF, CAD S/P CABG, breast CA and rectal CA who presented with shortness of breath due to CHF exacerbation. She was initially diuresed with IV Bumex but has been transitioned to PO. She was on Bipap but has been weaned to nasal cannula. Despite diuresis, the patient is unable to be weaned off of oxygen. Pulmonary consult was placed and it was recommended that the patient undergo a RHC which took place 5/10 and was grossly normal. A V/Q was also recommended which was negative for PE. The patient had choledocholithiasis s/p plastic biliary stent 1/20 and was due for repeat ERCP with stent exhange 4/26 as an outpatient however, she was admitted to the hospital. The ERCP can be done as an outpatient but the patient and family preference is to have it done while inpatient. Will discuss with GI if it can be done on minimal oxygen if cardiology and pulmonology both give clearance. Rest of plan as above.

## 2023-05-13 NOTE — PROGRESS NOTE ADULT - PROBLEM SELECTOR PLAN 1
ProBNP 2505, TTE 3/23 with EF 76%, severe pulmonary hypertension, moderate diastolic dysfunction. POCUS done in ED cw pulmonary edema  - CT chest 5/1 with unchanged pulmonary edema/effusions  - Per cardiology transition bumex 1mg IV BID to bumex 1 mg PO BID on 5/8  - c/w aldactone 12.5mg daily   - Strict I&Os (net negative past 24 hours)  - Daily weights (neg 4 kg since admission)  - cardiology stating pt is now euvolemic; rec pulm eval -> f/u RHC results  - pulm rec V/Q scan -> WNL, optimized from pulm perspective

## 2023-05-14 LAB
ALBUMIN SERPL ELPH-MCNC: 3.5 G/DL — SIGNIFICANT CHANGE UP (ref 3.3–5)
ALP SERPL-CCNC: 76 U/L — SIGNIFICANT CHANGE UP (ref 40–120)
ALT FLD-CCNC: 17 U/L — SIGNIFICANT CHANGE UP (ref 10–45)
ANION GAP SERPL CALC-SCNC: 11 MMOL/L — SIGNIFICANT CHANGE UP (ref 5–17)
APTT BLD: 86.2 SEC — HIGH (ref 27.5–35.5)
AST SERPL-CCNC: 18 U/L — SIGNIFICANT CHANGE UP (ref 10–40)
BASOPHILS # BLD AUTO: 0.03 K/UL — SIGNIFICANT CHANGE UP (ref 0–0.2)
BASOPHILS NFR BLD AUTO: 0.3 % — SIGNIFICANT CHANGE UP (ref 0–2)
BILIRUB SERPL-MCNC: 0.2 MG/DL — SIGNIFICANT CHANGE UP (ref 0.2–1.2)
BUN SERPL-MCNC: 40 MG/DL — HIGH (ref 7–23)
CALCIUM SERPL-MCNC: 9.5 MG/DL — SIGNIFICANT CHANGE UP (ref 8.4–10.5)
CHLORIDE SERPL-SCNC: 95 MMOL/L — LOW (ref 96–108)
CO2 SERPL-SCNC: 32 MMOL/L — HIGH (ref 22–31)
CREAT SERPL-MCNC: 1.92 MG/DL — HIGH (ref 0.5–1.3)
EGFR: 25 ML/MIN/1.73M2 — LOW
EOSINOPHIL # BLD AUTO: 0.08 K/UL — SIGNIFICANT CHANGE UP (ref 0–0.5)
EOSINOPHIL NFR BLD AUTO: 0.8 % — SIGNIFICANT CHANGE UP (ref 0–6)
GLUCOSE BLDC GLUCOMTR-MCNC: 151 MG/DL — HIGH (ref 70–99)
GLUCOSE BLDC GLUCOMTR-MCNC: 153 MG/DL — HIGH (ref 70–99)
GLUCOSE BLDC GLUCOMTR-MCNC: 171 MG/DL — HIGH (ref 70–99)
GLUCOSE BLDC GLUCOMTR-MCNC: 171 MG/DL — HIGH (ref 70–99)
GLUCOSE SERPL-MCNC: 173 MG/DL — HIGH (ref 70–99)
HCT VFR BLD CALC: 33.5 % — LOW (ref 34.5–45)
HGB BLD-MCNC: 9.7 G/DL — LOW (ref 11.5–15.5)
IMM GRANULOCYTES NFR BLD AUTO: 0.2 % — SIGNIFICANT CHANGE UP (ref 0–0.9)
LYMPHOCYTES # BLD AUTO: 1.66 K/UL — SIGNIFICANT CHANGE UP (ref 1–3.3)
LYMPHOCYTES # BLD AUTO: 17.6 % — SIGNIFICANT CHANGE UP (ref 13–44)
MAGNESIUM SERPL-MCNC: 1.8 MG/DL — SIGNIFICANT CHANGE UP (ref 1.6–2.6)
MCHC RBC-ENTMCNC: 27.8 PG — SIGNIFICANT CHANGE UP (ref 27–34)
MCHC RBC-ENTMCNC: 29 GM/DL — LOW (ref 32–36)
MCV RBC AUTO: 96 FL — SIGNIFICANT CHANGE UP (ref 80–100)
MONOCYTES # BLD AUTO: 0.56 K/UL — SIGNIFICANT CHANGE UP (ref 0–0.9)
MONOCYTES NFR BLD AUTO: 5.9 % — SIGNIFICANT CHANGE UP (ref 2–14)
NEUTROPHILS # BLD AUTO: 7.08 K/UL — SIGNIFICANT CHANGE UP (ref 1.8–7.4)
NEUTROPHILS NFR BLD AUTO: 75.2 % — SIGNIFICANT CHANGE UP (ref 43–77)
NRBC # BLD: 0 /100 WBCS — SIGNIFICANT CHANGE UP (ref 0–0)
PHOSPHATE SERPL-MCNC: 3.7 MG/DL — SIGNIFICANT CHANGE UP (ref 2.5–4.5)
PLATELET # BLD AUTO: 320 K/UL — SIGNIFICANT CHANGE UP (ref 150–400)
POTASSIUM SERPL-MCNC: 4.2 MMOL/L — SIGNIFICANT CHANGE UP (ref 3.5–5.3)
POTASSIUM SERPL-SCNC: 4.2 MMOL/L — SIGNIFICANT CHANGE UP (ref 3.5–5.3)
PROT SERPL-MCNC: 7 G/DL — SIGNIFICANT CHANGE UP (ref 6–8.3)
RBC # BLD: 3.49 M/UL — LOW (ref 3.8–5.2)
RBC # FLD: 19.8 % — HIGH (ref 10.3–14.5)
SODIUM SERPL-SCNC: 138 MMOL/L — SIGNIFICANT CHANGE UP (ref 135–145)
WBC # BLD: 9.43 K/UL — SIGNIFICANT CHANGE UP (ref 3.8–10.5)
WBC # FLD AUTO: 9.43 K/UL — SIGNIFICANT CHANGE UP (ref 3.8–10.5)

## 2023-05-14 PROCEDURE — 99232 SBSQ HOSP IP/OBS MODERATE 35: CPT | Mod: GC

## 2023-05-14 RX ADMIN — Medication 1: at 17:50

## 2023-05-14 RX ADMIN — Medication 325 MILLIGRAM(S): at 12:19

## 2023-05-14 RX ADMIN — Medication 100 MILLIGRAM(S): at 05:24

## 2023-05-14 RX ADMIN — Medication 1 TABLET(S): at 11:45

## 2023-05-14 RX ADMIN — Medication 1: at 12:18

## 2023-05-14 RX ADMIN — PANTOPRAZOLE SODIUM 40 MILLIGRAM(S): 20 TABLET, DELAYED RELEASE ORAL at 05:26

## 2023-05-14 RX ADMIN — SPIRONOLACTONE 12.5 MILLIGRAM(S): 25 TABLET, FILM COATED ORAL at 05:24

## 2023-05-14 RX ADMIN — Medication 7 UNIT(S): at 17:50

## 2023-05-14 RX ADMIN — Medication 3 MILLILITER(S): at 05:24

## 2023-05-14 RX ADMIN — BUMETANIDE 1 MILLIGRAM(S): 0.25 INJECTION INTRAMUSCULAR; INTRAVENOUS at 05:24

## 2023-05-14 RX ADMIN — INSULIN GLARGINE 18 UNIT(S): 100 INJECTION, SOLUTION SUBCUTANEOUS at 21:39

## 2023-05-14 RX ADMIN — ATORVASTATIN CALCIUM 40 MILLIGRAM(S): 80 TABLET, FILM COATED ORAL at 21:39

## 2023-05-14 RX ADMIN — Medication 1: at 08:09

## 2023-05-14 RX ADMIN — Medication 81 MILLIGRAM(S): at 12:19

## 2023-05-14 RX ADMIN — Medication 7 UNIT(S): at 12:18

## 2023-05-14 RX ADMIN — GABAPENTIN 200 MILLIGRAM(S): 400 CAPSULE ORAL at 12:19

## 2023-05-14 RX ADMIN — SENNA PLUS 2 TABLET(S): 8.6 TABLET ORAL at 21:39

## 2023-05-14 RX ADMIN — HEPARIN SODIUM 1400 UNIT(S)/HR: 5000 INJECTION INTRAVENOUS; SUBCUTANEOUS at 19:27

## 2023-05-14 RX ADMIN — Medication 3 MILLILITER(S): at 12:19

## 2023-05-14 RX ADMIN — Medication 7 UNIT(S): at 08:09

## 2023-05-14 RX ADMIN — HEPARIN SODIUM 1400 UNIT(S)/HR: 5000 INJECTION INTRAVENOUS; SUBCUTANEOUS at 07:15

## 2023-05-14 RX ADMIN — Medication 100 MILLIGRAM(S): at 17:49

## 2023-05-14 RX ADMIN — Medication 3 MILLILITER(S): at 17:50

## 2023-05-14 RX ADMIN — CHLORHEXIDINE GLUCONATE 1 APPLICATION(S): 213 SOLUTION TOPICAL at 06:56

## 2023-05-14 RX ADMIN — BUMETANIDE 1 MILLIGRAM(S): 0.25 INJECTION INTRAMUSCULAR; INTRAVENOUS at 13:30

## 2023-05-14 NOTE — PROGRESS NOTE ADULT - SUBJECTIVE AND OBJECTIVE BOX
Gabbi Monsivais MD   Internal Medicine, PGY 1  Contact via TEAMS.     SUBJECTIVE / OVERNIGHT EVENTS:  - Pt seen and examined at bedside  - DAMION    MEDICATIONS  (STANDING):  albuterol/ipratropium for Nebulization 3 milliLiter(s) Nebulizer every 6 hours  aspirin  chewable 81 milliGRAM(s) Oral daily  atorvastatin 40 milliGRAM(s) Oral at bedtime  buMETAnide 1 milliGRAM(s) Oral two times a day  chlorhexidine 2% Cloths 1 Application(s) Topical <User Schedule>  darbepoetin Injectable ViaL 40 MICROGram(s) SubCutaneous every 7 days  dextrose 5%. 1000 milliLiter(s) (100 mL/Hr) IV Continuous <Continuous>  dextrose 5%. 1000 milliLiter(s) (50 mL/Hr) IV Continuous <Continuous>  dextrose 50% Injectable 25 Gram(s) IV Push once  dextrose 50% Injectable 12.5 Gram(s) IV Push once  dextrose 50% Injectable 25 Gram(s) IV Push once  ferrous    sulfate 325 milliGRAM(s) Oral daily  gabapentin 200 milliGRAM(s) Oral daily  glucagon  Injectable 1 milliGRAM(s) IntraMuscular once  heparin  Infusion. 1200 Unit(s)/Hr (12 mL/Hr) IV Continuous <Continuous>  insulin glargine Injectable (LANTUS) 18 Unit(s) SubCutaneous at bedtime  insulin lispro (ADMELOG) corrective regimen sliding scale   SubCutaneous three times a day before meals  insulin lispro (ADMELOG) corrective regimen sliding scale   SubCutaneous at bedtime  insulin lispro Injectable (ADMELOG) 7 Unit(s) SubCutaneous three times a day with meals  metoprolol tartrate 100 milliGRAM(s) Oral every 12 hours  Nephro-ashley 1 Tablet(s) Oral daily  pantoprazole    Tablet 40 milliGRAM(s) Oral before breakfast  polyethylene glycol 3350 17 Gram(s) Oral daily  senna 2 Tablet(s) Oral at bedtime  spironolactone 12.5 milliGRAM(s) Oral daily    MEDICATIONS  (PRN):  acetaminophen     Tablet .. 650 milliGRAM(s) Oral every 6 hours PRN Temp greater or equal to 38C (100.4F), Mild Pain (1 - 3)  dextrose Oral Gel 15 Gram(s) Oral once PRN Blood Glucose LESS THAN 70 milliGRAM(s)/deciliter  melatonin 3 milliGRAM(s) Oral at bedtime PRN Insomnia        05-13-23 @ 07:01  -  05-14-23 @ 07:00  --------------------------------------------------------  IN: 610 mL / OUT: 1500 mL / NET: -890 mL        PHYSICAL EXAM:  Vital Signs Last 24 Hrs  T(C): 36.8 (14 May 2023 04:33), Max: 37 (13 May 2023 20:16)  T(F): 98.3 (14 May 2023 04:33), Max: 98.6 (13 May 2023 20:16)  HR: 96 (14 May 2023 04:33) (70 - 96)  BP: 103/61 (14 May 2023 04:33) (100/68 - 140/83)  BP(mean): --  RR: 17 (14 May 2023 04:33) (17 - 18)  SpO2: 98% (14 May 2023 04:33) (98% - 98%)    Parameters below as of 14 May 2023 04:33  Patient On (Oxygen Delivery Method): nasal cannula  O2 Flow (L/min): 2      CAPILLARY BLOOD GLUCOSE      POCT Blood Glucose.: 230 mg/dL (13 May 2023 22:26)  POCT Blood Glucose.: 216 mg/dL (13 May 2023 21:11)  POCT Blood Glucose.: 199 mg/dL (13 May 2023 17:10)  POCT Blood Glucose.: 195 mg/dL (13 May 2023 12:10)  POCT Blood Glucose.: 158 mg/dL (13 May 2023 08:07)    I&O's Summary    13 May 2023 07:01  -  14 May 2023 07:00  --------------------------------------------------------  IN: 610 mL / OUT: 1500 mL / NET: -890 mL        CONSTITUTIONAL: NAD  HEENT: NC/AT  RESPIRATORY: Normal respiratory effort; lungs are clear to auscultation bilaterally  CARDIOVASCULAR: Regular rate and rhythm, normal S1 and S2, no murmur/rub/gallop; No lower extremity edema; Peripheral pulses are 2+ bilaterally  ABDOMEN: Nontender to palpation, normoactive bowel sounds, no rebound/guarding; No hepatosplenomegaly  MUSCLOSKELETAL: no clubbing or cyanosis of digits; no joint swelling or tenderness to palpation  EXTREMITIES: no peripheral edema, distal pulses intact   NEURO: no focal deficits   PSYCH: A+O to person, place, and time; affect appropriate    LABS:                        9.7    9.43  )-----------( 320      ( 14 May 2023 06:35 )             33.5     05-13    137  |  94<L>  |  40<H>  ----------------------------<  158<H>  4.1   |  30  |  1.90<H>    Ca    9.5      13 May 2023 06:56  Phos  4.3     05-13  Mg     1.8     05-13    TPro  7.1  /  Alb  3.6  /  TBili  0.2  /  DBili  x   /  AST  16  /  ALT  13  /  AlkPhos  78  05-13    PTT - ( 14 May 2023 06:36 )  PTT:86.2 sec            IMAGING:    [X] All pertinent imaging reviewed by me Gabbi Monsivais MD   Internal Medicine, PGY 1  Contact via TEAMS.     SUBJECTIVE / OVERNIGHT EVENTS:  - Pt seen and examined at bedside  - SIDNEYEON  - Pt denies chest pain, SOB, abdominal pain.     MEDICATIONS  (STANDING):  albuterol/ipratropium for Nebulization 3 milliLiter(s) Nebulizer every 6 hours  aspirin  chewable 81 milliGRAM(s) Oral daily  atorvastatin 40 milliGRAM(s) Oral at bedtime  buMETAnide 1 milliGRAM(s) Oral two times a day  chlorhexidine 2% Cloths 1 Application(s) Topical <User Schedule>  darbepoetin Injectable ViaL 40 MICROGram(s) SubCutaneous every 7 days  dextrose 5%. 1000 milliLiter(s) (100 mL/Hr) IV Continuous <Continuous>  dextrose 5%. 1000 milliLiter(s) (50 mL/Hr) IV Continuous <Continuous>  dextrose 50% Injectable 25 Gram(s) IV Push once  dextrose 50% Injectable 12.5 Gram(s) IV Push once  dextrose 50% Injectable 25 Gram(s) IV Push once  ferrous    sulfate 325 milliGRAM(s) Oral daily  gabapentin 200 milliGRAM(s) Oral daily  glucagon  Injectable 1 milliGRAM(s) IntraMuscular once  heparin  Infusion. 1200 Unit(s)/Hr (12 mL/Hr) IV Continuous <Continuous>  insulin glargine Injectable (LANTUS) 18 Unit(s) SubCutaneous at bedtime  insulin lispro (ADMELOG) corrective regimen sliding scale   SubCutaneous three times a day before meals  insulin lispro (ADMELOG) corrective regimen sliding scale   SubCutaneous at bedtime  insulin lispro Injectable (ADMELOG) 7 Unit(s) SubCutaneous three times a day with meals  metoprolol tartrate 100 milliGRAM(s) Oral every 12 hours  Nephro-ashley 1 Tablet(s) Oral daily  pantoprazole    Tablet 40 milliGRAM(s) Oral before breakfast  polyethylene glycol 3350 17 Gram(s) Oral daily  senna 2 Tablet(s) Oral at bedtime  spironolactone 12.5 milliGRAM(s) Oral daily    MEDICATIONS  (PRN):  acetaminophen     Tablet .. 650 milliGRAM(s) Oral every 6 hours PRN Temp greater or equal to 38C (100.4F), Mild Pain (1 - 3)  dextrose Oral Gel 15 Gram(s) Oral once PRN Blood Glucose LESS THAN 70 milliGRAM(s)/deciliter  melatonin 3 milliGRAM(s) Oral at bedtime PRN Insomnia        05-13-23 @ 07:01  -  05-14-23 @ 07:00  --------------------------------------------------------  IN: 610 mL / OUT: 1500 mL / NET: -890 mL        PHYSICAL EXAM:  Vital Signs Last 24 Hrs  T(C): 36.8 (14 May 2023 04:33), Max: 37 (13 May 2023 20:16)  T(F): 98.3 (14 May 2023 04:33), Max: 98.6 (13 May 2023 20:16)  HR: 96 (14 May 2023 04:33) (70 - 96)  BP: 103/61 (14 May 2023 04:33) (100/68 - 140/83)  BP(mean): --  RR: 17 (14 May 2023 04:33) (17 - 18)  SpO2: 98% (14 May 2023 04:33) (98% - 98%)    Parameters below as of 14 May 2023 04:33  Patient On (Oxygen Delivery Method): nasal cannula  O2 Flow (L/min): 2      CAPILLARY BLOOD GLUCOSE      POCT Blood Glucose.: 230 mg/dL (13 May 2023 22:26)  POCT Blood Glucose.: 216 mg/dL (13 May 2023 21:11)  POCT Blood Glucose.: 199 mg/dL (13 May 2023 17:10)  POCT Blood Glucose.: 195 mg/dL (13 May 2023 12:10)  POCT Blood Glucose.: 158 mg/dL (13 May 2023 08:07)    I&O's Summary    13 May 2023 07:01  -  14 May 2023 07:00  --------------------------------------------------------  IN: 610 mL / OUT: 1500 mL / NET: -890 mL        CONSTITUTIONAL: NAD  HEENT: NC/AT  RESPIRATORY: Normal respiratory effort; lungs are clear to auscultation bilaterally  CARDIOVASCULAR: Regular rate and rhythm, normal S1 and S2, no murmur/rub/gallop; No lower extremity edema; Peripheral pulses are 2+ bilaterally  ABDOMEN: Nontender to palpation, normoactive bowel sounds, no rebound/guarding; No hepatosplenomegaly  MUSCLOSKELETAL: no clubbing or cyanosis of digits; no joint swelling or tenderness to palpation  EXTREMITIES: no peripheral edema, distal pulses intact   NEURO: no focal deficits   PSYCH: A+O to person, place, and time; affect appropriate    LABS:                        9.7    9.43  )-----------( 320      ( 14 May 2023 06:35 )             33.5     05-13    137  |  94<L>  |  40<H>  ----------------------------<  158<H>  4.1   |  30  |  1.90<H>    Ca    9.5      13 May 2023 06:56  Phos  4.3     05-13  Mg     1.8     05-13    TPro  7.1  /  Alb  3.6  /  TBili  0.2  /  DBili  x   /  AST  16  /  ALT  13  /  AlkPhos  78  05-13    PTT - ( 14 May 2023 06:36 )  PTT:86.2 sec            IMAGING:    [X] All pertinent imaging reviewed by me Gabbi Monsivais MD   Internal Medicine, PGY 1  Contact via TEAMS.     SUBJECTIVE / OVERNIGHT EVENTS:  - Pt seen and examined at bedside  - SIDNEYEON  - Pt denies chest pain, SOB, abdominal pain.     MEDICATIONS  (STANDING):  albuterol/ipratropium for Nebulization 3 milliLiter(s) Nebulizer every 6 hours  aspirin  chewable 81 milliGRAM(s) Oral daily  atorvastatin 40 milliGRAM(s) Oral at bedtime  buMETAnide 1 milliGRAM(s) Oral two times a day  chlorhexidine 2% Cloths 1 Application(s) Topical <User Schedule>  darbepoetin Injectable ViaL 40 MICROGram(s) SubCutaneous every 7 days  dextrose 5%. 1000 milliLiter(s) (100 mL/Hr) IV Continuous <Continuous>  dextrose 5%. 1000 milliLiter(s) (50 mL/Hr) IV Continuous <Continuous>  dextrose 50% Injectable 25 Gram(s) IV Push once  dextrose 50% Injectable 12.5 Gram(s) IV Push once  dextrose 50% Injectable 25 Gram(s) IV Push once  ferrous    sulfate 325 milliGRAM(s) Oral daily  gabapentin 200 milliGRAM(s) Oral daily  glucagon  Injectable 1 milliGRAM(s) IntraMuscular once  heparin  Infusion. 1200 Unit(s)/Hr (12 mL/Hr) IV Continuous <Continuous>  insulin glargine Injectable (LANTUS) 18 Unit(s) SubCutaneous at bedtime  insulin lispro (ADMELOG) corrective regimen sliding scale   SubCutaneous three times a day before meals  insulin lispro (ADMELOG) corrective regimen sliding scale   SubCutaneous at bedtime  insulin lispro Injectable (ADMELOG) 7 Unit(s) SubCutaneous three times a day with meals  metoprolol tartrate 100 milliGRAM(s) Oral every 12 hours  Nephro-ashley 1 Tablet(s) Oral daily  pantoprazole    Tablet 40 milliGRAM(s) Oral before breakfast  polyethylene glycol 3350 17 Gram(s) Oral daily  senna 2 Tablet(s) Oral at bedtime  spironolactone 12.5 milliGRAM(s) Oral daily    MEDICATIONS  (PRN):  acetaminophen     Tablet .. 650 milliGRAM(s) Oral every 6 hours PRN Temp greater or equal to 38C (100.4F), Mild Pain (1 - 3)  dextrose Oral Gel 15 Gram(s) Oral once PRN Blood Glucose LESS THAN 70 milliGRAM(s)/deciliter  melatonin 3 milliGRAM(s) Oral at bedtime PRN Insomnia        05-13-23 @ 07:01  -  05-14-23 @ 07:00  --------------------------------------------------------  IN: 610 mL / OUT: 1500 mL / NET: -890 mL        PHYSICAL EXAM:  Vital Signs Last 24 Hrs  T(C): 36.8 (14 May 2023 04:33), Max: 37 (13 May 2023 20:16)  T(F): 98.3 (14 May 2023 04:33), Max: 98.6 (13 May 2023 20:16)  HR: 96 (14 May 2023 04:33) (70 - 96)  BP: 103/61 (14 May 2023 04:33) (100/68 - 140/83)  BP(mean): --  RR: 17 (14 May 2023 04:33) (17 - 18)  SpO2: 98% (14 May 2023 04:33) (98% - 98%)    Parameters below as of 14 May 2023 04:33  Patient On (Oxygen Delivery Method): nasal cannula  O2 Flow (L/min): 2      CAPILLARY BLOOD GLUCOSE      POCT Blood Glucose.: 230 mg/dL (13 May 2023 22:26)  POCT Blood Glucose.: 216 mg/dL (13 May 2023 21:11)  POCT Blood Glucose.: 199 mg/dL (13 May 2023 17:10)  POCT Blood Glucose.: 195 mg/dL (13 May 2023 12:10)  POCT Blood Glucose.: 158 mg/dL (13 May 2023 08:07)    I&O's Summary    13 May 2023 07:01  -  14 May 2023 07:00  --------------------------------------------------------  IN: 610 mL / OUT: 1500 mL / NET: -890 mL        CONSTITUTIONAL: NAD  HEENT: NC/AT  RESPIRATORY: mild crackles b/l  CARDIOVASCULAR: Regular rate and rhythm, normal S1 and S2, no murmur/rub/gallop; No lower extremity edema; Peripheral pulses are 2+ bilaterally  ABDOMEN: Nontender to palpation, normoactive bowel sounds, no rebound/guarding; No hepatosplenomegaly  MUSCLOSKELETAL: no clubbing or cyanosis of digits; no joint swelling or tenderness to palpation  EXTREMITIES: no peripheral edema, distal pulses intact   NEURO: no focal deficits   PSYCH: A+O to person, place, and time; affect appropriate    LABS:                        9.7    9.43  )-----------( 320      ( 14 May 2023 06:35 )             33.5     05-13    137  |  94<L>  |  40<H>  ----------------------------<  158<H>  4.1   |  30  |  1.90<H>    Ca    9.5      13 May 2023 06:56  Phos  4.3     05-13  Mg     1.8     05-13    TPro  7.1  /  Alb  3.6  /  TBili  0.2  /  DBili  x   /  AST  16  /  ALT  13  /  AlkPhos  78  05-13    PTT - ( 14 May 2023 06:36 )  PTT:86.2 sec            IMAGING:    [X] All pertinent imaging reviewed by me

## 2023-05-14 NOTE — PROGRESS NOTE ADULT - ASSESSMENT
83F PMH of HTN, HLD, T2DM, HFpEF (EF 65%), CAD s/p CABG, Breast CA s/p R partial mastectomy, rectal CA s/p resection with recent admission for acute rissa s/p ERCP w/ stent c/b gallbladder perf w/ perc rissa and recurrent SVT, presenting for shortness of breath, admitted for HF exacerbation. Remains on O2 despite continued diuresis.

## 2023-05-14 NOTE — PROGRESS NOTE ADULT - SUBJECTIVE AND OBJECTIVE BOX
DATE OF SERVICE: 05-14-23 @ 15:35    Patient is a 83y old  Female who presents with a chief complaint of Shortness of breath (14 May 2023 07:07)      INTERVAL HISTORY: no complaints     REVIEW OF SYSTEMS:  CONSTITUTIONAL: No weakness  EYES/ENT: No visual changes;  No throat pain   NECK: No pain or stiffness  RESPIRATORY: No cough, wheezing; No shortness of breath  CARDIOVASCULAR: No chest pain or palpitations  GASTROINTESTINAL: No abdominal  pain. No nausea, vomiting, or hematemesis  GENITOURINARY: No dysuria, frequency or hematuria  NEUROLOGICAL: No stroke like symptoms  SKIN: No rashes      	  MEDICATIONS:  buMETAnide 1 milliGRAM(s) Oral two times a day  metoprolol tartrate 100 milliGRAM(s) Oral every 12 hours  spironolactone 12.5 milliGRAM(s) Oral daily        PHYSICAL EXAM:  T(C): 36.7 (05-14-23 @ 11:34), Max: 37 (05-13-23 @ 20:16)  HR: 65 (05-14-23 @ 11:34) (65 - 96)  BP: 100/68 (05-14-23 @ 11:34) (100/68 - 140/83)  RR: 18 (05-14-23 @ 15:15) (17 - 18)  SpO2: 98% (05-14-23 @ 15:15) (87% - 98%)  Wt(kg): --  I&O's Summary    13 May 2023 07:01  -  14 May 2023 07:00  --------------------------------------------------------  IN: 610 mL / OUT: 1500 mL / NET: -890 mL    14 May 2023 07:01  -  14 May 2023 15:35  --------------------------------------------------------  IN: 610 mL / OUT: 300 mL / NET: 310 mL          Appearance: In no distress	  HEENT:    PERRL, EOMI	  Cardiovascular:  S1 S2, No JVD  Respiratory: Lungs clear to auscultation	  Gastrointestinal:  Soft, Non-tender, + BS	  Vascularature:  No edema of LE  Psychiatric: Appropriate affect   Neuro: no acute focal deficits                               9.7    9.43  )-----------( 320      ( 14 May 2023 06:35 )             33.5     05-14    138  |  95<L>  |  40<H>  ----------------------------<  173<H>  4.2   |  32<H>  |  1.92<H>    Ca    9.5      14 May 2023 06:34  Phos  3.7     05-14  Mg     1.8     05-14    TPro  7.0  /  Alb  3.5  /  TBili  0.2  /  DBili  x   /  AST  18  /  ALT  17  /  AlkPhos  76  05-14        Labs personally reviewed      ASSESSMENT/PLAN: 	    Ms. Batista is a 84 YO woman with PMH of HTN, HLD, DM2, HFpEF (EF 65%), CAD s/p CABG, Breast CA s/p R partial mastectomy, rectal CA s/p resection with recent admission for acute rissa s/p ERCP w/ stent c/b gallbladder perf w/ perc rissa and recurrent SVT, presenting for shortness of breath. Denies CP, palpitations, orthopnea or syncope. Follows as OP with Dr. Hagan.    Problem/Plan -1  Problem: Acute on Chronic Diastolic CHF  - POCUS suggestive of pulm edema  - CXR with no pulm edema or effusion. Prior CT one month ago with moderate effusions and pulm edema  - proBNP 2505 --> 1536  - Prior TTE 3/23 shows preserved EF, hyperdynamic LV function, basal inferoseptum hypokinesis, decreased RV systolic function severe pulm pressures, B/L pleural effusions  - Repeat TTE 5/6 Normal left ventricular cavity size. The left ventricular wall thickness is normal. The left ventricular systolic function is normal, EF 62%. No RWA. Mod PH. PAS pressure 56 mmHg.  - Strict I&Os, daily weights  - Reports SOB, still requires supplemental O2 intermittently   - Continue Bumex 1mg BID PO   - Initiated Good 25mg daily   - RHC with normal PCWP and CI, moderate-severe pulm HTN  - V/Q no PE    Problem/Plan -2  Problem: CAD   - s/p CABG 2020  - ECG with no ischemia noted  - Trop 21  - c/w ASA, metoprolol and statin  - Prior TTE 3/23 shows preserved EF, hyperdynamic LV function, basal inferoseptal hypokinesis, decreased RV systolic function severe pulm pressures   - 5/5 c/o CP/SOB: trop/CKMB/ECG wnl.     Problem/Plan -3  Problem: hx of Recurrent SVT  - c/w Metoprolol 100mg PO BID  - cont to monitor on tele    Problem/Plan -4  Problem: Atrial Fibrillation  - Patient with n/o A-flutter on previous admission  - c/w Metoprolol   - eliquis 2.5mg PO BID on hold   - c/w Heparin gtt    Problem/Plan- 5  Problem: Hx of Cholecystitis, preop Risk Stratification   - Euvolemic, optimized from cardiac standpoint  - elevated risk for mod risk ERCP, no cardiac contraindication to proceed         MAINE Mancia DO [facility]  Cardiovascular Medicine  [address] [place] [address], [address]  Office: [phone]  Available via call/text on [employer] Teams

## 2023-05-15 LAB
ALBUMIN SERPL ELPH-MCNC: 3.4 G/DL — SIGNIFICANT CHANGE UP (ref 3.3–5)
ALP SERPL-CCNC: 83 U/L — SIGNIFICANT CHANGE UP (ref 40–120)
ALT FLD-CCNC: 14 U/L — SIGNIFICANT CHANGE UP (ref 10–45)
ANION GAP SERPL CALC-SCNC: 10 MMOL/L — SIGNIFICANT CHANGE UP (ref 5–17)
APTT BLD: 133.3 SEC — CRITICAL HIGH (ref 27.5–35.5)
APTT BLD: 44.7 SEC — HIGH (ref 27.5–35.5)
AST SERPL-CCNC: 20 U/L — SIGNIFICANT CHANGE UP (ref 10–40)
BASOPHILS # BLD AUTO: 0.03 K/UL — SIGNIFICANT CHANGE UP (ref 0–0.2)
BASOPHILS NFR BLD AUTO: 0.3 % — SIGNIFICANT CHANGE UP (ref 0–2)
BILIRUB SERPL-MCNC: 0.3 MG/DL — SIGNIFICANT CHANGE UP (ref 0.2–1.2)
BUN SERPL-MCNC: 39 MG/DL — HIGH (ref 7–23)
CALCIUM SERPL-MCNC: 10 MG/DL — SIGNIFICANT CHANGE UP (ref 8.4–10.5)
CHLORIDE SERPL-SCNC: 94 MMOL/L — LOW (ref 96–108)
CO2 SERPL-SCNC: 34 MMOL/L — HIGH (ref 22–31)
CREAT SERPL-MCNC: 1.87 MG/DL — HIGH (ref 0.5–1.3)
EGFR: 26 ML/MIN/1.73M2 — LOW
EOSINOPHIL # BLD AUTO: 0.08 K/UL — SIGNIFICANT CHANGE UP (ref 0–0.5)
EOSINOPHIL NFR BLD AUTO: 0.9 % — SIGNIFICANT CHANGE UP (ref 0–6)
GLUCOSE BLDC GLUCOMTR-MCNC: 115 MG/DL — HIGH (ref 70–99)
GLUCOSE BLDC GLUCOMTR-MCNC: 181 MG/DL — HIGH (ref 70–99)
GLUCOSE BLDC GLUCOMTR-MCNC: 185 MG/DL — HIGH (ref 70–99)
GLUCOSE BLDC GLUCOMTR-MCNC: 207 MG/DL — HIGH (ref 70–99)
GLUCOSE SERPL-MCNC: 102 MG/DL — HIGH (ref 70–99)
HCT VFR BLD CALC: 35.6 % — SIGNIFICANT CHANGE UP (ref 34.5–45)
HCT VFR BLD CALC: 36.3 % — SIGNIFICANT CHANGE UP (ref 34.5–45)
HGB BLD-MCNC: 10.6 G/DL — LOW (ref 11.5–15.5)
HGB BLD-MCNC: 10.6 G/DL — LOW (ref 11.5–15.5)
IMM GRANULOCYTES NFR BLD AUTO: 0.4 % — SIGNIFICANT CHANGE UP (ref 0–0.9)
LACTATE BLDV-MCNC: 1.5 MMOL/L — SIGNIFICANT CHANGE UP (ref 0.5–2)
LYMPHOCYTES # BLD AUTO: 1.62 K/UL — SIGNIFICANT CHANGE UP (ref 1–3.3)
LYMPHOCYTES # BLD AUTO: 17.6 % — SIGNIFICANT CHANGE UP (ref 13–44)
MAGNESIUM SERPL-MCNC: 2 MG/DL — SIGNIFICANT CHANGE UP (ref 1.6–2.6)
MCHC RBC-ENTMCNC: 28.2 PG — SIGNIFICANT CHANGE UP (ref 27–34)
MCHC RBC-ENTMCNC: 28.3 PG — SIGNIFICANT CHANGE UP (ref 27–34)
MCHC RBC-ENTMCNC: 29.2 GM/DL — LOW (ref 32–36)
MCHC RBC-ENTMCNC: 29.8 GM/DL — LOW (ref 32–36)
MCV RBC AUTO: 94.9 FL — SIGNIFICANT CHANGE UP (ref 80–100)
MCV RBC AUTO: 96.5 FL — SIGNIFICANT CHANGE UP (ref 80–100)
MONOCYTES # BLD AUTO: 0.66 K/UL — SIGNIFICANT CHANGE UP (ref 0–0.9)
MONOCYTES NFR BLD AUTO: 7.2 % — SIGNIFICANT CHANGE UP (ref 2–14)
NEUTROPHILS # BLD AUTO: 6.76 K/UL — SIGNIFICANT CHANGE UP (ref 1.8–7.4)
NEUTROPHILS NFR BLD AUTO: 73.6 % — SIGNIFICANT CHANGE UP (ref 43–77)
NRBC # BLD: 0 /100 WBCS — SIGNIFICANT CHANGE UP (ref 0–0)
NRBC # BLD: 0 /100 WBCS — SIGNIFICANT CHANGE UP (ref 0–0)
PHOSPHATE SERPL-MCNC: 4 MG/DL — SIGNIFICANT CHANGE UP (ref 2.5–4.5)
PLATELET # BLD AUTO: 351 K/UL — SIGNIFICANT CHANGE UP (ref 150–400)
PLATELET # BLD AUTO: 353 K/UL — SIGNIFICANT CHANGE UP (ref 150–400)
POTASSIUM SERPL-MCNC: 4.5 MMOL/L — SIGNIFICANT CHANGE UP (ref 3.5–5.3)
POTASSIUM SERPL-SCNC: 4.5 MMOL/L — SIGNIFICANT CHANGE UP (ref 3.5–5.3)
PROT SERPL-MCNC: 7.2 G/DL — SIGNIFICANT CHANGE UP (ref 6–8.3)
RBC # BLD: 3.75 M/UL — LOW (ref 3.8–5.2)
RBC # BLD: 3.76 M/UL — LOW (ref 3.8–5.2)
RBC # FLD: 19.8 % — HIGH (ref 10.3–14.5)
RBC # FLD: 19.8 % — HIGH (ref 10.3–14.5)
SODIUM SERPL-SCNC: 138 MMOL/L — SIGNIFICANT CHANGE UP (ref 135–145)
WBC # BLD: 9.19 K/UL — SIGNIFICANT CHANGE UP (ref 3.8–10.5)
WBC # BLD: 9.88 K/UL — SIGNIFICANT CHANGE UP (ref 3.8–10.5)
WBC # FLD AUTO: 9.19 K/UL — SIGNIFICANT CHANGE UP (ref 3.8–10.5)
WBC # FLD AUTO: 9.88 K/UL — SIGNIFICANT CHANGE UP (ref 3.8–10.5)

## 2023-05-15 PROCEDURE — 99233 SBSQ HOSP IP/OBS HIGH 50: CPT | Mod: GC

## 2023-05-15 RX ORDER — HEPARIN SODIUM 5000 [USP'U]/ML
1100 INJECTION INTRAVENOUS; SUBCUTANEOUS
Qty: 25000 | Refills: 0 | Status: DISCONTINUED | OUTPATIENT
Start: 2023-05-15 | End: 2023-05-16

## 2023-05-15 RX ORDER — HEPARIN SODIUM 5000 [USP'U]/ML
1200 INJECTION INTRAVENOUS; SUBCUTANEOUS
Qty: 25000 | Refills: 0 | Status: DISCONTINUED | OUTPATIENT
Start: 2023-05-15 | End: 2023-05-15

## 2023-05-15 RX ORDER — APIXABAN 2.5 MG/1
2.5 TABLET, FILM COATED ORAL EVERY 12 HOURS
Refills: 0 | Status: DISCONTINUED | OUTPATIENT
Start: 2023-05-15 | End: 2023-05-15

## 2023-05-15 RX ADMIN — ATORVASTATIN CALCIUM 40 MILLIGRAM(S): 80 TABLET, FILM COATED ORAL at 21:34

## 2023-05-15 RX ADMIN — SPIRONOLACTONE 12.5 MILLIGRAM(S): 25 TABLET, FILM COATED ORAL at 05:14

## 2023-05-15 RX ADMIN — Medication 100 MILLIGRAM(S): at 05:13

## 2023-05-15 RX ADMIN — Medication 7 UNIT(S): at 17:25

## 2023-05-15 RX ADMIN — SENNA PLUS 2 TABLET(S): 8.6 TABLET ORAL at 21:34

## 2023-05-15 RX ADMIN — Medication 1: at 12:11

## 2023-05-15 RX ADMIN — INSULIN GLARGINE 18 UNIT(S): 100 INJECTION, SOLUTION SUBCUTANEOUS at 21:34

## 2023-05-15 RX ADMIN — Medication 7 UNIT(S): at 12:11

## 2023-05-15 RX ADMIN — Medication 2: at 17:25

## 2023-05-15 RX ADMIN — Medication 7 UNIT(S): at 07:47

## 2023-05-15 RX ADMIN — HEPARIN SODIUM 1100 UNIT(S)/HR: 5000 INJECTION INTRAVENOUS; SUBCUTANEOUS at 09:08

## 2023-05-15 RX ADMIN — GABAPENTIN 200 MILLIGRAM(S): 400 CAPSULE ORAL at 12:10

## 2023-05-15 RX ADMIN — HEPARIN SODIUM 1300 UNIT(S)/HR: 5000 INJECTION INTRAVENOUS; SUBCUTANEOUS at 21:37

## 2023-05-15 RX ADMIN — Medication 100 MILLIGRAM(S): at 17:24

## 2023-05-15 RX ADMIN — CHLORHEXIDINE GLUCONATE 1 APPLICATION(S): 213 SOLUTION TOPICAL at 05:15

## 2023-05-15 RX ADMIN — HEPARIN SODIUM 0 UNIT(S)/HR: 5000 INJECTION INTRAVENOUS; SUBCUTANEOUS at 08:05

## 2023-05-15 RX ADMIN — Medication 325 MILLIGRAM(S): at 12:10

## 2023-05-15 RX ADMIN — BUMETANIDE 1 MILLIGRAM(S): 0.25 INJECTION INTRAMUSCULAR; INTRAVENOUS at 05:14

## 2023-05-15 RX ADMIN — Medication 3 MILLILITER(S): at 05:15

## 2023-05-15 RX ADMIN — Medication 0: at 07:47

## 2023-05-15 RX ADMIN — Medication 81 MILLIGRAM(S): at 12:10

## 2023-05-15 RX ADMIN — Medication 3 MILLILITER(S): at 12:12

## 2023-05-15 RX ADMIN — BUMETANIDE 1 MILLIGRAM(S): 0.25 INJECTION INTRAMUSCULAR; INTRAVENOUS at 13:47

## 2023-05-15 RX ADMIN — PANTOPRAZOLE SODIUM 40 MILLIGRAM(S): 20 TABLET, DELAYED RELEASE ORAL at 05:13

## 2023-05-15 RX ADMIN — Medication 1 TABLET(S): at 12:11

## 2023-05-15 RX ADMIN — POLYETHYLENE GLYCOL 3350 17 GRAM(S): 17 POWDER, FOR SOLUTION ORAL at 12:12

## 2023-05-15 RX ADMIN — HEPARIN SODIUM 1100 UNIT(S)/HR: 5000 INJECTION INTRAVENOUS; SUBCUTANEOUS at 13:48

## 2023-05-15 NOTE — PROGRESS NOTE ADULT - PROBLEM SELECTOR PLAN 2
- Currently on 2L NC  - goal sat 92-95%  - Not on home O2  - Duonebs q6 for wheezes  - Incentive spirometry  - will have pulm evaluate patient given mod PH as a possible etiology of her hypoxia -> potential RHC 5/9  - might require home O2 setup - Currently on 2L NC  - goal sat 92-95%  - Not on home O2  - Duonebs q6 for wheezes  - Incentive spirometry  - will have pulm evaluate patient given mod PH as a possible etiology of her hypoxia -> potential RHC 5/9  - will setup home O2

## 2023-05-15 NOTE — CHART NOTE - NSCHARTNOTEFT_GEN_A_CORE
Dx : Post-capillary pulmonary hypertension      - Room air pulse ox. at rest:  87%   - pulse ox 96% On 2L at rest      Patient will require home O2 for discharge.  Patient is aware and agreeable to home O2.  Patient is in a chronic stable state of pulmonary hypertension.

## 2023-05-15 NOTE — PROGRESS NOTE ADULT - SUBJECTIVE AND OBJECTIVE BOX
Nephrology Progress Note    Patient is a 83y female in bed comfortable. Breathing stable.    Allergies:  CT scan dye (Hives)  penicillin (Unknown)    Hospital Medications:   MEDICATIONS  (STANDING):  albuterol/ipratropium for Nebulization 3 milliLiter(s) Nebulizer every 6 hours  aspirin  chewable 81 milliGRAM(s) Oral daily  atorvastatin 40 milliGRAM(s) Oral at bedtime  buMETAnide 1 milliGRAM(s) Oral two times a day  chlorhexidine 2% Cloths 1 Application(s) Topical <User Schedule>  darbepoetin Injectable ViaL 40 MICROGram(s) SubCutaneous every 7 days  dextrose 5%. 1000 milliLiter(s) (50 mL/Hr) IV Continuous <Continuous>  dextrose 5%. 1000 milliLiter(s) (100 mL/Hr) IV Continuous <Continuous>  dextrose 50% Injectable 25 Gram(s) IV Push once  dextrose 50% Injectable 25 Gram(s) IV Push once  dextrose 50% Injectable 12.5 Gram(s) IV Push once  ferrous    sulfate 325 milliGRAM(s) Oral daily  gabapentin 200 milliGRAM(s) Oral daily  glucagon  Injectable 1 milliGRAM(s) IntraMuscular once  heparin  Infusion. 1200 Unit(s)/Hr (12 mL/Hr) IV Continuous <Continuous>  insulin glargine Injectable (LANTUS) 18 Unit(s) SubCutaneous at bedtime  insulin lispro (ADMELOG) corrective regimen sliding scale   SubCutaneous three times a day before meals  insulin lispro (ADMELOG) corrective regimen sliding scale   SubCutaneous at bedtime  insulin lispro Injectable (ADMELOG) 7 Unit(s) SubCutaneous three times a day with meals  metoprolol tartrate 100 milliGRAM(s) Oral every 12 hours  Nephro-ashley 1 Tablet(s) Oral daily  pantoprazole    Tablet 40 milliGRAM(s) Oral before breakfast  polyethylene glycol 3350 17 Gram(s) Oral daily  senna 2 Tablet(s) Oral at bedtime  spironolactone 12.5 milliGRAM(s) Oral daily    VITALS:  T(F): 97.8 (23 @ 04:24), Max: 98.2 (23 @ 20:17)  HR: 76 (23 @ 04:24)  BP: 110/60 (23 @ 04:24)  RR: 18 (23 @ 04:24)  SpO2: 97% (23 @ 04:24)       @ 07:01  -   @ 07:00  --------------------------------------------------------  IN: 1116 mL / OUT: 1250 mL / NET: -134 mL     @ 07:  -   @ 07:00  --------------------------------------------------------  IN: 450 mL / OUT: 600 mL / NET: -150 mL        PHYSICAL EXAM:  Constitutional: NAD  HEENT: anicteric sclera, oropharynx clear, MMM  Neck: No JVD  Respiratory: CTAB, no wheezes, rales or rhonchi  Cardiovascular: S1, S2, RRR  Gastrointestinal: BS+, soft, NT/ND  Extremities: + edema  Neurological: A/O x 3, no focal deficits  Psychiatric: Normal mood, normal affect  : No CVA tenderness. No chen.   Skin: no rash      LABS:      137  |  94<L>  |  40<H>  ----------------------------<  158<H>  4.1   |  30  |  1.90<H>    Ca    9.5      13 May 2023 06:56  Phos  4.3       Mg     1.8         TPro  7.1  /  Alb  3.6  /  TBili  0.2  /  DBili      /  AST  16  /  ALT  13  /  AlkPhos  78                            9.4    8.33  )-----------( 308      ( 13 May 2023 06:58 )             32.3       Urine Studies:  Urinalysis Basic - ( 08 May 2023 15:14 )    Color: Yellow / Appearance: Slightly Turbid / S.012 / pH:   Gluc:  / Ketone: Negative  / Bili: Negative / Urobili: Negative   Blood:  / Protein: Trace / Nitrite: Negative   Leuk Esterase: Large / RBC: 6 /hpf /  /HPF   Sq Epi:  / Non Sq Epi:  / Bacteria: Many           Nephrology Progress Note    Patient is a 83y female sitting in chair on 2 liter NC  no distresss  Allergies:  CT scan dye (Hives)  penicillin (Unknown)    Hospital Medications:   MEDICATIONS  (STANDING):  albuterol/ipratropium for Nebulization 3 milliLiter(s) Nebulizer every 6 hours  aspirin  chewable 81 milliGRAM(s) Oral daily  atorvastatin 40 milliGRAM(s) Oral at bedtime  buMETAnide 1 milliGRAM(s) Oral two times a day  chlorhexidine 2% Cloths 1 Application(s) Topical <User Schedule>  darbepoetin Injectable ViaL 40 MICROGram(s) SubCutaneous every 7 days  dextrose 5%. 1000 milliLiter(s) (50 mL/Hr) IV Continuous <Continuous>  dextrose 5%. 1000 milliLiter(s) (100 mL/Hr) IV Continuous <Continuous>  dextrose 50% Injectable 25 Gram(s) IV Push once  dextrose 50% Injectable 25 Gram(s) IV Push once  dextrose 50% Injectable 12.5 Gram(s) IV Push once  ferrous    sulfate 325 milliGRAM(s) Oral daily  gabapentin 200 milliGRAM(s) Oral daily  glucagon  Injectable 1 milliGRAM(s) IntraMuscular once  heparin  Infusion. 1200 Unit(s)/Hr (12 mL/Hr) IV Continuous <Continuous>  insulin glargine Injectable (LANTUS) 18 Unit(s) SubCutaneous at bedtime  insulin lispro (ADMELOG) corrective regimen sliding scale   SubCutaneous three times a day before meals  insulin lispro (ADMELOG) corrective regimen sliding scale   SubCutaneous at bedtime  insulin lispro Injectable (ADMELOG) 7 Unit(s) SubCutaneous three times a day with meals  metoprolol tartrate 100 milliGRAM(s) Oral every 12 hours  Nephro-ashley 1 Tablet(s) Oral daily  pantoprazole    Tablet 40 milliGRAM(s) Oral before breakfast  polyethylene glycol 3350 17 Gram(s) Oral daily  senna 2 Tablet(s) Oral at bedtime  spironolactone 12.5 milliGRAM(s) Oral daily    VITALS:  T(F): 97.8 (23 @ 04:24), Max: 98.2 (23 @ 20:17)  HR: 76 (23 @ 04:24)  BP: 110/60 (23 @ 04:24)  RR: 18 (23 @ 04:24)  SpO2: 97% (23 @ 04:24)       @ 07:01  -   @ 07:00  --------------------------------------------------------  IN: 1116 mL / OUT: 1250 mL / NET: -134 mL     @ 07:  -   @ 07:00  --------------------------------------------------------  IN: 450 mL / OUT: 600 mL / NET: -150 mL        PHYSICAL EXAM:  Constitutional: NAD  HEENT: anicteric sclera, oropharynx clear, MMM  Neck: No JVD  Respiratory: CTAB, no wheezes, rales or rhonchi  Cardiovascular: S1, S2, RRR  Gastrointestinal: BS+, soft, NT/ND  Extremities: + edema  Neurological: A/O x 3, no focal deficits  Psychiatric: Normal mood, normal affect  : No CVA tenderness. No chen.   Skin: no rash      LABS:      137  |  94<L>  |  40<H>  ----------------------------<  158<H>  4.1   |  30  |  1.90<H>    Ca    9.5      13 May 2023 06:56  Phos  4.3       Mg     1.8         TPro  7.1  /  Alb  3.6  /  TBili  0.2  /  DBili      /  AST  16  /  ALT  13  /  AlkPhos  78                            9.4    8.33  )-----------( 308      ( 13 May 2023 06:58 )             32.3       Urine Studies:  Urinalysis Basic - ( 08 May 2023 15:14 )    Color: Yellow / Appearance: Slightly Turbid / S.012 / pH:   Gluc:  / Ketone: Negative  / Bili: Negative / Urobili: Negative   Blood:  / Protein: Trace / Nitrite: Negative   Leuk Esterase: Large / RBC: 6 /hpf /  /HPF   Sq Epi:  / Non Sq Epi:  / Bacteria: Many           Nephrology Progress Note    Patient is a 83y female sitting in chair on 2 liter NC  no distress  Allergies:  CT scan dye (Hives)  penicillin (Unknown)    Hospital Medications:   MEDICATIONS  (STANDING):  albuterol/ipratropium for Nebulization 3 milliLiter(s) Nebulizer every 6 hours  aspirin  chewable 81 milliGRAM(s) Oral daily  atorvastatin 40 milliGRAM(s) Oral at bedtime  buMETAnide 1 milliGRAM(s) Oral two times a day  chlorhexidine 2% Cloths 1 Application(s) Topical <User Schedule>  darbepoetin Injectable ViaL 40 MICROGram(s) SubCutaneous every 7 days  dextrose 5%. 1000 milliLiter(s) (50 mL/Hr) IV Continuous <Continuous>  dextrose 5%. 1000 milliLiter(s) (100 mL/Hr) IV Continuous <Continuous>  dextrose 50% Injectable 25 Gram(s) IV Push once  dextrose 50% Injectable 25 Gram(s) IV Push once  dextrose 50% Injectable 12.5 Gram(s) IV Push once  ferrous    sulfate 325 milliGRAM(s) Oral daily  gabapentin 200 milliGRAM(s) Oral daily  glucagon  Injectable 1 milliGRAM(s) IntraMuscular once  heparin  Infusion. 1200 Unit(s)/Hr (12 mL/Hr) IV Continuous <Continuous>  insulin glargine Injectable (LANTUS) 18 Unit(s) SubCutaneous at bedtime  insulin lispro (ADMELOG) corrective regimen sliding scale   SubCutaneous three times a day before meals  insulin lispro (ADMELOG) corrective regimen sliding scale   SubCutaneous at bedtime  insulin lispro Injectable (ADMELOG) 7 Unit(s) SubCutaneous three times a day with meals  metoprolol tartrate 100 milliGRAM(s) Oral every 12 hours  Nephro-ashley 1 Tablet(s) Oral daily  pantoprazole    Tablet 40 milliGRAM(s) Oral before breakfast  polyethylene glycol 3350 17 Gram(s) Oral daily  senna 2 Tablet(s) Oral at bedtime  spironolactone 12.5 milliGRAM(s) Oral daily    VITALS:  T(F): 97.8 (23 @ 04:24), Max: 98.2 (23 @ 20:17)  HR: 76 (23 @ 04:24)  BP: 110/60 (23 @ 04:24)  RR: 18 (23 @ 04:24)  SpO2: 97% (23 @ 04:24)       @ 07:01  -   @ 07:00  --------------------------------------------------------  IN: 1116 mL / OUT: 1250 mL / NET: -134 mL     @ 07:  -   @ 07:00  --------------------------------------------------------  IN: 450 mL / OUT: 600 mL / NET: -150 mL        PHYSICAL EXAM:  Constitutional: NAD  HEENT: anicteric sclera, oropharynx clear, MMM  Neck: No JVD  Respiratory: CTAB, no wheezes, rales or rhonchi  Cardiovascular: S1, S2, RRR  Gastrointestinal: BS+, soft, NT/ND  Extremities: + edema  Neurological: A/O x 3, no focal deficits  Psychiatric: Normal mood, normal affect  : No CVA tenderness. No chen.   Skin: no rash      LABS:      137  |  94<L>  |  40<H>  ----------------------------<  158<H>  4.1   |  30  |  1.90<H>    Ca    9.5      13 May 2023 06:56  Phos  4.3       Mg     1.8         TPro  7.1  /  Alb  3.6  /  TBili  0.2  /  DBili      /  AST  16  /  ALT  13  /  AlkPhos  78                            9.4    8.33  )-----------( 308      ( 13 May 2023 06:58 )             32.3       Urine Studies:  Urinalysis Basic - ( 08 May 2023 15:14 )    Color: Yellow / Appearance: Slightly Turbid / S.012 / pH:   Gluc:  / Ketone: Negative  / Bili: Negative / Urobili: Negative   Blood:  / Protein: Trace / Nitrite: Negative   Leuk Esterase: Large / RBC: 6 /hpf /  /HPF   Sq Epi:  / Non Sq Epi:  / Bacteria: Many

## 2023-05-15 NOTE — PROGRESS NOTE ADULT - SUBJECTIVE AND OBJECTIVE BOX
Sultan Sixto MD  PGY 1 Department of Internal Medicine        Patient is a 83y old  Female who presents with a chief complaint of Shortness of breath (14 May 2023 15:33)      SUBJECTIVE / OVERNIGHT EVENTS: Pt seen and examined. No acute overnight events. Denies fevers, chills, CP, SOB, Abdominal pain, N/V, Constipation, Diarrhea        MEDICATIONS  (STANDING):  albuterol/ipratropium for Nebulization 3 milliLiter(s) Nebulizer every 6 hours  aspirin  chewable 81 milliGRAM(s) Oral daily  atorvastatin 40 milliGRAM(s) Oral at bedtime  buMETAnide 1 milliGRAM(s) Oral two times a day  chlorhexidine 2% Cloths 1 Application(s) Topical <User Schedule>  darbepoetin Injectable ViaL 40 MICROGram(s) SubCutaneous every 7 days  dextrose 5%. 1000 milliLiter(s) (50 mL/Hr) IV Continuous <Continuous>  dextrose 5%. 1000 milliLiter(s) (100 mL/Hr) IV Continuous <Continuous>  dextrose 50% Injectable 25 Gram(s) IV Push once  dextrose 50% Injectable 12.5 Gram(s) IV Push once  dextrose 50% Injectable 25 Gram(s) IV Push once  ferrous    sulfate 325 milliGRAM(s) Oral daily  gabapentin 200 milliGRAM(s) Oral daily  glucagon  Injectable 1 milliGRAM(s) IntraMuscular once  heparin  Infusion. 1200 Unit(s)/Hr (12 mL/Hr) IV Continuous <Continuous>  insulin glargine Injectable (LANTUS) 18 Unit(s) SubCutaneous at bedtime  insulin lispro (ADMELOG) corrective regimen sliding scale   SubCutaneous at bedtime  insulin lispro (ADMELOG) corrective regimen sliding scale   SubCutaneous three times a day before meals  insulin lispro Injectable (ADMELOG) 7 Unit(s) SubCutaneous three times a day with meals  metoprolol tartrate 100 milliGRAM(s) Oral every 12 hours  Nephro-ashley 1 Tablet(s) Oral daily  pantoprazole    Tablet 40 milliGRAM(s) Oral before breakfast  polyethylene glycol 3350 17 Gram(s) Oral daily  senna 2 Tablet(s) Oral at bedtime  spironolactone 12.5 milliGRAM(s) Oral daily    MEDICATIONS  (PRN):  acetaminophen     Tablet .. 650 milliGRAM(s) Oral every 6 hours PRN Temp greater or equal to 38C (100.4F), Mild Pain (1 - 3)  dextrose Oral Gel 15 Gram(s) Oral once PRN Blood Glucose LESS THAN 70 milliGRAM(s)/deciliter  melatonin 3 milliGRAM(s) Oral at bedtime PRN Insomnia      I&O's Summary    13 May 2023 07:01  -  14 May 2023 07:00  --------------------------------------------------------  IN: 610 mL / OUT: 1500 mL / NET: -890 mL    14 May 2023 07:01  -  15 May 2023 05:27  --------------------------------------------------------  IN: 610 mL / OUT: 400 mL / NET: 210 mL        Vital Signs Last 24 Hrs  T(C): 36.5 (15 May 2023 04:00), Max: 36.8 (14 May 2023 20:56)  T(F): 97.7 (15 May 2023 04:00), Max: 98.2 (14 May 2023 20:56)  HR: 64 (15 May 2023 04:00) (64 - 73)  BP: 123/68 (15 May 2023 05:13) (100/68 - 130/81)  BP(mean): --  RR: 17 (15 May 2023 04:00) (17 - 18)  SpO2: 100% (15 May 2023 04:00) (87% - 100%)    Parameters below as of 15 May 2023 04:00  Patient On (Oxygen Delivery Method): nasal cannula  O2 Flow (L/min): 2      CAPILLARY BLOOD GLUCOSE      POCT Blood Glucose.: 151 mg/dL (14 May 2023 21:08)  POCT Blood Glucose.: 153 mg/dL (14 May 2023 17:09)  POCT Blood Glucose.: 171 mg/dL (14 May 2023 12:00)  POCT Blood Glucose.: 171 mg/dL (14 May 2023 07:57)      PHYSICAL EXAM:  CONSTITUTIONAL: NAD  HEENT: NC/AT  RESPIRATORY: mild crackles b/l  CARDIOVASCULAR: Regular rate and rhythm, normal S1 and S2, no murmur/rub/gallop; No lower extremity edema; Peripheral pulses are 2+ bilaterally  ABDOMEN: Nontender to palpation, normoactive bowel sounds, no rebound/guarding; No hepatosplenomegaly  MUSCLOSKELETAL: no clubbing or cyanosis of digits; no joint swelling or tenderness to palpation  EXTREMITIES: no peripheral edema, distal pulses intact   NEURO: no focal deficits   PSYCH: A+O to person, place, and time; affect appropriate     LABS:                        9.7    9.43  )-----------( 320      ( 14 May 2023 06:35 )             33.5     Auto Eosinophil # 0.08  / Auto Eosinophil % 0.8   / Auto Neutrophil # 7.08  / Auto Neutrophil % 75.2  / BANDS % x                            9.4    8.33  )-----------( 308      ( 13 May 2023 06:58 )             32.3     Auto Eosinophil # 0.22  / Auto Eosinophil % 2.6   / Auto Neutrophil # 6.88  / Auto Neutrophil % 82.6  / BANDS % x        05-14    138  |  95<L>  |  40<H>  ----------------------------<  173<H>  4.2   |  32<H>  |  1.92<H>  05-13    137  |  94<L>  |  40<H>  ----------------------------<  158<H>  4.1   |  30  |  1.90<H>    Ca    9.5      14 May 2023 06:34  Mg     1.8     05-14  Phos  3.7     05-14  TPro  7.0  /  Alb  3.5  /  TBili  0.2  /  DBili  x   /  AST  18  /  ALT  17  /  AlkPhos  76  05-14  TPro  7.1  /  Alb  3.6  /  TBili  0.2  /  DBili  x   /  AST  16  /  ALT  13  /  AlkPhos  78  05-13    PTT - ( 14 May 2023 06:36 )  PTT:86.2 sec              RADIOLOGY & ADDITIONAL TESTS:    Imaging Personally Reviewed:    Consultant(s) Notes Reviewed:      Care Discussed with Consultants/Other Providers:   Sultan Sixto MD  PGY 1 Department of Internal Medicine        Patient is a 83y old  Female who presents with a chief complaint of Shortness of breath (14 May 2023 15:33)      SUBJECTIVE / OVERNIGHT EVENTS: Pt seen and examined. No acute overnight events. Denies fevers, chills, CP, SOB, Abdominal pain, N/V, Constipation, Diarrhea. Feels stable.        MEDICATIONS  (STANDING):  albuterol/ipratropium for Nebulization 3 milliLiter(s) Nebulizer every 6 hours  aspirin  chewable 81 milliGRAM(s) Oral daily  atorvastatin 40 milliGRAM(s) Oral at bedtime  buMETAnide 1 milliGRAM(s) Oral two times a day  chlorhexidine 2% Cloths 1 Application(s) Topical <User Schedule>  darbepoetin Injectable ViaL 40 MICROGram(s) SubCutaneous every 7 days  dextrose 5%. 1000 milliLiter(s) (50 mL/Hr) IV Continuous <Continuous>  dextrose 5%. 1000 milliLiter(s) (100 mL/Hr) IV Continuous <Continuous>  dextrose 50% Injectable 25 Gram(s) IV Push once  dextrose 50% Injectable 12.5 Gram(s) IV Push once  dextrose 50% Injectable 25 Gram(s) IV Push once  ferrous    sulfate 325 milliGRAM(s) Oral daily  gabapentin 200 milliGRAM(s) Oral daily  glucagon  Injectable 1 milliGRAM(s) IntraMuscular once  heparin  Infusion. 1200 Unit(s)/Hr (12 mL/Hr) IV Continuous <Continuous>  insulin glargine Injectable (LANTUS) 18 Unit(s) SubCutaneous at bedtime  insulin lispro (ADMELOG) corrective regimen sliding scale   SubCutaneous at bedtime  insulin lispro (ADMELOG) corrective regimen sliding scale   SubCutaneous three times a day before meals  insulin lispro Injectable (ADMELOG) 7 Unit(s) SubCutaneous three times a day with meals  metoprolol tartrate 100 milliGRAM(s) Oral every 12 hours  Nephro-ashley 1 Tablet(s) Oral daily  pantoprazole    Tablet 40 milliGRAM(s) Oral before breakfast  polyethylene glycol 3350 17 Gram(s) Oral daily  senna 2 Tablet(s) Oral at bedtime  spironolactone 12.5 milliGRAM(s) Oral daily    MEDICATIONS  (PRN):  acetaminophen     Tablet .. 650 milliGRAM(s) Oral every 6 hours PRN Temp greater or equal to 38C (100.4F), Mild Pain (1 - 3)  dextrose Oral Gel 15 Gram(s) Oral once PRN Blood Glucose LESS THAN 70 milliGRAM(s)/deciliter  melatonin 3 milliGRAM(s) Oral at bedtime PRN Insomnia      I&O's Summary    13 May 2023 07:01  -  14 May 2023 07:00  --------------------------------------------------------  IN: 610 mL / OUT: 1500 mL / NET: -890 mL    14 May 2023 07:01  -  15 May 2023 05:27  --------------------------------------------------------  IN: 610 mL / OUT: 400 mL / NET: 210 mL        Vital Signs Last 24 Hrs  T(C): 36.5 (15 May 2023 04:00), Max: 36.8 (14 May 2023 20:56)  T(F): 97.7 (15 May 2023 04:00), Max: 98.2 (14 May 2023 20:56)  HR: 64 (15 May 2023 04:00) (64 - 73)  BP: 123/68 (15 May 2023 05:13) (100/68 - 130/81)  BP(mean): --  RR: 17 (15 May 2023 04:00) (17 - 18)  SpO2: 100% (15 May 2023 04:00) (87% - 100%)    Parameters below as of 15 May 2023 04:00  Patient On (Oxygen Delivery Method): nasal cannula  O2 Flow (L/min): 2      CAPILLARY BLOOD GLUCOSE      POCT Blood Glucose.: 151 mg/dL (14 May 2023 21:08)  POCT Blood Glucose.: 153 mg/dL (14 May 2023 17:09)  POCT Blood Glucose.: 171 mg/dL (14 May 2023 12:00)  POCT Blood Glucose.: 171 mg/dL (14 May 2023 07:57)      PHYSICAL EXAM:  CONSTITUTIONAL: NAD  HEENT: NC/AT  RESPIRATORY: mild crackles b/l  CARDIOVASCULAR: Regular rate and rhythm, normal S1 and S2, no murmur/rub/gallop; No lower extremity edema; Peripheral pulses are 2+ bilaterally  ABDOMEN: Nontender to palpation, normoactive bowel sounds, no rebound/guarding; No hepatosplenomegaly  MUSCLOSKELETAL: no clubbing or cyanosis of digits; no joint swelling or tenderness to palpation  EXTREMITIES: no peripheral edema, distal pulses intact   NEURO: no focal deficits   PSYCH: A+O to person, place, and time; affect appropriate     LABS:                        9.7    9.43  )-----------( 320      ( 14 May 2023 06:35 )             33.5     Auto Eosinophil # 0.08  / Auto Eosinophil % 0.8   / Auto Neutrophil # 7.08  / Auto Neutrophil % 75.2  / BANDS % x                            9.4    8.33  )-----------( 308      ( 13 May 2023 06:58 )             32.3     Auto Eosinophil # 0.22  / Auto Eosinophil % 2.6   / Auto Neutrophil # 6.88  / Auto Neutrophil % 82.6  / BANDS % x        05-14    138  |  95<L>  |  40<H>  ----------------------------<  173<H>  4.2   |  32<H>  |  1.92<H>  05-13    137  |  94<L>  |  40<H>  ----------------------------<  158<H>  4.1   |  30  |  1.90<H>    Ca    9.5      14 May 2023 06:34  Mg     1.8     05-14  Phos  3.7     05-14  TPro  7.0  /  Alb  3.5  /  TBili  0.2  /  DBili  x   /  AST  18  /  ALT  17  /  AlkPhos  76  05-14  TPro  7.1  /  Alb  3.6  /  TBili  0.2  /  DBili  x   /  AST  16  /  ALT  13  /  AlkPhos  78  05-13    PTT - ( 14 May 2023 06:36 )  PTT:86.2 sec              RADIOLOGY & ADDITIONAL TESTS:    Imaging Personally Reviewed:    Consultant(s) Notes Reviewed:      Care Discussed with Consultants/Other Providers:

## 2023-05-15 NOTE — PROGRESS NOTE ADULT - SUBJECTIVE AND OBJECTIVE BOX
DATE OF SERVICE: 05-15-23 @ 15:07    Patient is a 83y old  Female who presents with a chief complaint of Shortness of breath (15 May 2023 11:26)      INTERVAL HISTORY: Feels ok. Frustrated that ERCP has been postponed.     REVIEW OF SYSTEMS:  CONSTITUTIONAL: No weakness  EYES/ENT: No visual changes;  No throat pain   NECK: No pain or stiffness  RESPIRATORY: No cough, wheezing; No shortness of breath  CARDIOVASCULAR: No chest pain or palpitations  GASTROINTESTINAL: No abdominal  pain. No nausea, vomiting, or hematemesis  GENITOURINARY: No dysuria, frequency or hematuria  NEUROLOGICAL: No stroke like symptoms  SKIN: No rashes    TELEMETRY Personally reviewed: sinus rhythm HR 50-70s   	  MEDICATIONS:  buMETAnide 1 milliGRAM(s) Oral two times a day  metoprolol tartrate 100 milliGRAM(s) Oral every 12 hours  spironolactone 12.5 milliGRAM(s) Oral daily        PHYSICAL EXAM:  T(C): 36.8 (05-15-23 @ 11:19), Max: 36.8 (05-14-23 @ 20:56)  HR: 69 (05-15-23 @ 11:19) (64 - 73)  BP: 115/74 (05-15-23 @ 11:19) (115/74 - 130/81)  RR: 18 (05-15-23 @ 11:19) (17 - 18)  SpO2: 88% (05-15-23 @ 11:47) (88% - 100%)  Wt(kg): --  I&O's Summary    14 May 2023 07:01  -  15 May 2023 07:00  --------------------------------------------------------  IN: 610 mL / OUT: 400 mL / NET: 210 mL    15 May 2023 07:01  -  15 May 2023 15:07  --------------------------------------------------------  IN: 440 mL / OUT: 0 mL / NET: 440 mL          Appearance: In no distress	  HEENT:    PERRL, EOMI	  Cardiovascular:  S1 S2, No JVD  Respiratory: Lungs clear to auscultation	  Gastrointestinal:  Soft, Non-tender, + BS	  Vascularature:  No edema of LE  Psychiatric: Appropriate affect   Neuro: no acute focal deficits                               10.6   9.19  )-----------( 351      ( 15 May 2023 06:55 )             36.3     05-15    138  |  94<L>  |  39<H>  ----------------------------<  102<H>  4.5   |  34<H>  |  1.87<H>    Ca    10.0      15 May 2023 06:58  Phos  4.0     05-15  Mg     2.0     05-15    TPro  7.2  /  Alb  3.4  /  TBili  0.3  /  DBili  x   /  AST  20  /  ALT  14  /  AlkPhos  83  05-15        Labs personally reviewed      ASSESSMENT/PLAN: 	    Ms. Batista is a 82 YO woman with PMH of HTN, HLD, DM2, HFpEF (EF 65%), CAD s/p CABG, Breast CA s/p R partial mastectomy, rectal CA s/p resection with recent admission for acute rissa s/p ERCP w/ stent c/b gallbladder perf w/ perc rissa and recurrent SVT, presenting for shortness of breath. Denies CP, palpitations, orthopnea or syncope. Follows as OP with Dr. Hagan.    Problem/Plan -1  Problem: Acute on Chronic Diastolic CHF  - Prior TTE 3/23 shows preserved EF, hyperdynamic LV function, basal inferoseptum hypokinesis, decreased RV systolic function severe pulm pressures, B/L pleural effusions  - Repeat TTE 5/6 Normal left ventricular cavity size. The left ventricular wall thickness is normal. The left ventricular systolic function is normal, EF 62%. No RWA. Mod PH. PAS pressure 56 mmHg.  - RHC (5/10) with normal PCWP and CI, moderate-severe pulm HTN  - V/Q no PE (5/12)   - CT chest (5/1) with small pleural effusions and mosaic attenuation of the lungs suggestive of fluid overload, not significantly changed from 2/3/23  - CXR (5/12) clear lungs   - Continue Bumex 1mg BID PO   - Continue spironolactone 12.5mg PO daily     Problem/Plan -2  Problem: CAD   - s/p CABG 2020  - ECG with no ischemia noted  - Trop 21  - c/w ASA, statin, and metoprolol     Problem/Plan -3  Problem: hx of Recurrent SVT  - c/w Metoprolol 100mg PO BID  - cont to monitor on tele    Problem/Plan -4  Problem: Atrial Fibrillation  - Patient with n/o A-flutter on previous admission  - c/w Metoprolol   - Eliquis 2.5mg PO BID on hold   - c/w Heparin gtt    Problem/Plan- 5  Problem: Hx of Cholecystitis, preop Risk Stratification   - Euvolemic, optimized from cardiac standpoint  - elevated risk for mod risk ERCP, no cardiac contraindication to proceed         ZOË Melissa DO [facility]  Cardiovascular Medicine  [address] [place] [address], [address]  Available through call or text on [employer]AM[employer]  Office: [phone]

## 2023-05-15 NOTE — PROGRESS NOTE ADULT - ASSESSMENT
Ms. Batista is a 84 YO woman with PMH of HTN, HLD, DM2, HFpEF (EF 65%), CAD s/p CABG, Breast CA s/p R partial mastectomy, rectal CA s/p resection with previous admission for acute rissa s/p ERCP w/ stent c/b gallbladder perf w/ perc rissa and recurrent SVT, and recent admission for CHF exacerbation presenting for shortness of breath.----out patient nephrologist Dr Kan .    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 pt is  euvolemic   hypervolemia - pt is  euvolemic   Electrolytes - controlled  Anemia- -ckd related  also consistent  iron deficiency , tsat 7, ferritin 28---sp iv venofer  5 doses completed on 4/3023   GI- ERCP on hold due to volume status ----- improved euvolemic   met alkalosis --- in the view of diuretic --- cont to monitor   a fib --on heparin   CAD;; EF 62  %  , HRC -- -- 5/10/23      RECOMMENDATIONS                  Brendakarine Rader  [facility]  Office: [phone]   Ms. Batista is a 82 YO woman with PMH of HTN, HLD, DM2, HFpEF (EF 65%), CAD s/p CABG, Breast CA s/p R partial mastectomy, rectal CA s/p resection with previous admission for acute rissa s/p ERCP w/ stent c/b gallbladder perf w/ perc rissa and recurrent SVT, and recent admission for CHF exacerbation presenting for shortness of breath.----out patient nephrologist Dr Kan .    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 pt is  euvolemic   hypervolemia - pt is  euvolemic   Electrolytes - controlled  Anemia- -ckd related  also consistent  iron deficiency , tsat 7, ferritin 28---sp iv venofer  5 doses completed on 4/3023   GI- ERCP on hold due to volume status ----- improved euvolemic   met alkalosis --- in the view of diuretic --- cont to monitor   a fib --  SP heparin drip----- on eliquis   CAD;; EF 62  %  , HRC -- -- 5/10/23      RECOMMENDATIONS  - Continue Bumex 1 mg PO x  bid  - Cont spironolactone 12.5mg po daily ( anti ryan + diuretic )   - Keep k ~4 and mag 2  - Dose all medications for GFR ~25 ml/min  - Monitor I/Os and daily weights  -  cont daily oral iron , weekly Aranesp 40 mcg SQ .                [facility]  Office: [phone]   Ms. Batista is a 82 YO woman with PMH of HTN, HLD, DM2, HFpEF (EF 65%), CAD s/p CABG, Breast CA s/p R partial mastectomy, rectal CA s/p resection with previous admission for acute rissa s/p ERCP w/ stent c/b gallbladder perf w/ perc rissa and recurrent SVT, and recent admission for CHF exacerbation presenting for shortness of breath.----out patient nephrologist Dr Kan .    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 pt is  euvolemic   hypervolemia - pt is  euvolemic   Electrolytes - controlled  Anemia- -ckd related  also consistent  iron deficiency , tsat 7, ferritin 28---sp iv venofer  5 doses completed on 4/3023   GI- ERCP on hold due to volume status ----- improved euvolemic   met alkalosis --- in the view of diuretic --- cont to monitor   a fib --  SP heparin drip----- on eliquis   CAD;; EF 62  %  , HRC -- -- 5/10/23      RECOMMENDATIONS  - Continue Bumex 1 mg PO x  bid  - Cont spironolactone 12.5mg po daily ( anti ryan + diuretic )   - Keep k ~4 and mag 2  - Dose all medications for GFR ~25 ml/min  - Monitor I/Os and daily weights-- euvolemic   -  cont daily oral iron , weekly Aranesp 40 mcg SQ .              d-w daughter   [facility]  Office: [phone]

## 2023-05-15 NOTE — PROGRESS NOTE ADULT - PROBLEM SELECTOR PLAN 3
- ERCP was planned for outpatient 4/26  - GI aware, will await improvement in respiratory function  - once pulm and cards documents clearance, will reach out to GI - ERCP was planned for outpatient 4/26  - GI aware, will await improvement in respiratory function  - once pulm and cards documents clearance, will reach out to GI  - no plan for inpt intervention as documented by GI; f/u outpt

## 2023-05-15 NOTE — PROGRESS NOTE ADULT - ATTENDING COMMENTS
83 year old Syriac speaking female with PMH HTN, HLD, DM2, heart failure with preserved EF, CAD S/P CABG, breast CA and rectal CA who presented with shortness of breath due to CHF exacerbation. She was initially diuresed with IV Bumex but has been transitioned to PO per cardiology recs. She was on Bipap but has been weaned to nasal cannula. Despite diuresis, the patient is unable to be weaned off of oxygen. Pulmonary consult was placed and it was recommended that the patient undergo a RHC which took place 5/10 and was significant for PH. A V/Q was also recommended which showed low probability of PE. The patient had choledocholithiasis s/p plastic biliary stent 1/20 and was due for repeat ERCP with stent exhange 4/26 as an outpatient however, she was admitted to the hospital. The ERCP can be done as an outpatient but the patient and family preference is to have it done while inpatient. She has been cleared by cardiology and pulmonology to have the ERCP done, she is tentatively added on for tomorrow. Rest of plan as above.

## 2023-05-15 NOTE — CHART NOTE - NSCHARTNOTEFT_GEN_A_CORE
Brief GI update  ==========  Discussed with patient's primary gastroenterologist, Dr. Blanchard. Given patient's prolonged stay, new oxygen requirements and deconditioning would opt to perform the ERCP as outpatient in a few weeks to allow recovery. Patient will be scheduled for a televisit with GI and pre-surgical testing.    GI to sign off, please reconsult as needed.    Thank you for involving us in the care of this patient. Please reach out if any further questions.    Sam Melton, PGY-6  Gastroenterology/Hepatology Fellow    Available on [employer] Teams  After 5PM/Weekends, please contact the on-call GI fellow: [phone] Brief GI update  ==========  Discussed with patient's primary gastroenterologist, Dr. Blanchard. Given patient's prolonged stay, new oxygen requirements and deconditioning would opt to perform the ERCP as outpatient in a few weeks to allow recovery. Discussed with patient's daughter at length, who requests the procedure to be done while still admitted. Patient and daughter are aware of the increased procedural risk in the setting of deconditioning but would like to proceed regardless.     Will attempt an ERCP tomorrow, pending suite availability.    - NPO midnight  - Hold heparin at 4AM  - 3AM CBC, CMP, coags    Thank you for involving us in the care of this patient. Please reach out if any further questions.    Sam Melton, PGY-6  Gastroenterology/Hepatology Fellow    Available on [employer] Teams  After 5PM/Weekends, please contact the on-call GI fellow: [phone]

## 2023-05-16 DIAGNOSIS — N17.9 ACUTE KIDNEY FAILURE, UNSPECIFIED: ICD-10-CM

## 2023-05-16 LAB
ALBUMIN SERPL ELPH-MCNC: 3.4 G/DL — SIGNIFICANT CHANGE UP (ref 3.3–5)
ALP SERPL-CCNC: 76 U/L — SIGNIFICANT CHANGE UP (ref 40–120)
ALT FLD-CCNC: 15 U/L — SIGNIFICANT CHANGE UP (ref 10–45)
ANION GAP SERPL CALC-SCNC: 13 MMOL/L — SIGNIFICANT CHANGE UP (ref 5–17)
ANION GAP SERPL CALC-SCNC: 9 MMOL/L — SIGNIFICANT CHANGE UP (ref 5–17)
APPEARANCE UR: ABNORMAL
APTT BLD: 72 SEC — HIGH (ref 27.5–35.5)
AST SERPL-CCNC: 18 U/L — SIGNIFICANT CHANGE UP (ref 10–40)
BACTERIA # UR AUTO: ABNORMAL
BASOPHILS # BLD AUTO: 0.03 K/UL — SIGNIFICANT CHANGE UP (ref 0–0.2)
BASOPHILS NFR BLD AUTO: 0.3 % — SIGNIFICANT CHANGE UP (ref 0–2)
BILIRUB SERPL-MCNC: 0.2 MG/DL — SIGNIFICANT CHANGE UP (ref 0.2–1.2)
BILIRUB UR-MCNC: NEGATIVE — SIGNIFICANT CHANGE UP
BUN SERPL-MCNC: 30 MG/DL — HIGH (ref 7–23)
BUN SERPL-MCNC: 43 MG/DL — HIGH (ref 7–23)
CALCIUM SERPL-MCNC: 6.2 MG/DL — CRITICAL LOW (ref 8.4–10.5)
CALCIUM SERPL-MCNC: 9.4 MG/DL — SIGNIFICANT CHANGE UP (ref 8.4–10.5)
CHLORIDE SERPL-SCNC: 112 MMOL/L — HIGH (ref 96–108)
CHLORIDE SERPL-SCNC: 97 MMOL/L — SIGNIFICANT CHANGE UP (ref 96–108)
CO2 SERPL-SCNC: 21 MMOL/L — LOW (ref 22–31)
CO2 SERPL-SCNC: 31 MMOL/L — SIGNIFICANT CHANGE UP (ref 22–31)
COLOR SPEC: SIGNIFICANT CHANGE UP
CREAT ?TM UR-MCNC: 64 MG/DL — SIGNIFICANT CHANGE UP
CREAT SERPL-MCNC: 1.42 MG/DL — HIGH (ref 0.5–1.3)
CREAT SERPL-MCNC: 2.35 MG/DL — HIGH (ref 0.5–1.3)
DIFF PNL FLD: NEGATIVE — SIGNIFICANT CHANGE UP
EGFR: 20 ML/MIN/1.73M2 — LOW
EGFR: 36 ML/MIN/1.73M2 — LOW
EOSINOPHIL # BLD AUTO: 0.1 K/UL — SIGNIFICANT CHANGE UP (ref 0–0.5)
EOSINOPHIL NFR BLD AUTO: 1.2 % — SIGNIFICANT CHANGE UP (ref 0–6)
EPI CELLS # UR: 3 /HPF — SIGNIFICANT CHANGE UP
GLUCOSE BLDC GLUCOMTR-MCNC: 161 MG/DL — HIGH (ref 70–99)
GLUCOSE BLDC GLUCOMTR-MCNC: 165 MG/DL — HIGH (ref 70–99)
GLUCOSE BLDC GLUCOMTR-MCNC: 167 MG/DL — HIGH (ref 70–99)
GLUCOSE BLDC GLUCOMTR-MCNC: 227 MG/DL — HIGH (ref 70–99)
GLUCOSE SERPL-MCNC: 118 MG/DL — HIGH (ref 70–99)
GLUCOSE SERPL-MCNC: 160 MG/DL — HIGH (ref 70–99)
GLUCOSE UR QL: NEGATIVE — SIGNIFICANT CHANGE UP
HCT VFR BLD CALC: 32.1 % — LOW (ref 34.5–45)
HGB BLD-MCNC: 9.4 G/DL — LOW (ref 11.5–15.5)
HYALINE CASTS # UR AUTO: 0 /LPF — SIGNIFICANT CHANGE UP (ref 0–2)
IMM GRANULOCYTES NFR BLD AUTO: 0.3 % — SIGNIFICANT CHANGE UP (ref 0–0.9)
INR BLD: 1.05 RATIO — SIGNIFICANT CHANGE UP (ref 0.88–1.16)
KETONES UR-MCNC: NEGATIVE — SIGNIFICANT CHANGE UP
LEUKOCYTE ESTERASE UR-ACNC: ABNORMAL
LYMPHOCYTES # BLD AUTO: 1.79 K/UL — SIGNIFICANT CHANGE UP (ref 1–3.3)
LYMPHOCYTES # BLD AUTO: 20.7 % — SIGNIFICANT CHANGE UP (ref 13–44)
MAGNESIUM SERPL-MCNC: 1.8 MG/DL — SIGNIFICANT CHANGE UP (ref 1.6–2.6)
MCHC RBC-ENTMCNC: 28.2 PG — SIGNIFICANT CHANGE UP (ref 27–34)
MCHC RBC-ENTMCNC: 29.3 GM/DL — LOW (ref 32–36)
MCV RBC AUTO: 96.4 FL — SIGNIFICANT CHANGE UP (ref 80–100)
MONOCYTES # BLD AUTO: 0.64 K/UL — SIGNIFICANT CHANGE UP (ref 0–0.9)
MONOCYTES NFR BLD AUTO: 7.4 % — SIGNIFICANT CHANGE UP (ref 2–14)
NEUTROPHILS # BLD AUTO: 6.04 K/UL — SIGNIFICANT CHANGE UP (ref 1.8–7.4)
NEUTROPHILS NFR BLD AUTO: 70.1 % — SIGNIFICANT CHANGE UP (ref 43–77)
NITRITE UR-MCNC: NEGATIVE — SIGNIFICANT CHANGE UP
NRBC # BLD: 0 /100 WBCS — SIGNIFICANT CHANGE UP (ref 0–0)
OSMOLALITY UR: 360 MOS/KG — SIGNIFICANT CHANGE UP (ref 300–900)
PH UR: 6 — SIGNIFICANT CHANGE UP (ref 5–8)
PHOSPHATE SERPL-MCNC: 4 MG/DL — SIGNIFICANT CHANGE UP (ref 2.5–4.5)
PLATELET # BLD AUTO: 304 K/UL — SIGNIFICANT CHANGE UP (ref 150–400)
POTASSIUM SERPL-MCNC: 4.4 MMOL/L — SIGNIFICANT CHANGE UP (ref 3.5–5.3)
POTASSIUM SERPL-MCNC: 4.5 MMOL/L — SIGNIFICANT CHANGE UP (ref 3.5–5.3)
POTASSIUM SERPL-SCNC: 4.4 MMOL/L — SIGNIFICANT CHANGE UP (ref 3.5–5.3)
POTASSIUM SERPL-SCNC: 4.5 MMOL/L — SIGNIFICANT CHANGE UP (ref 3.5–5.3)
POTASSIUM UR-SCNC: 24 MMOL/L — SIGNIFICANT CHANGE UP
PROT ?TM UR-MCNC: 13 MG/DL — HIGH (ref 0–12)
PROT SERPL-MCNC: 6.6 G/DL — SIGNIFICANT CHANGE UP (ref 6–8.3)
PROT UR-MCNC: NEGATIVE — SIGNIFICANT CHANGE UP
PROT/CREAT UR-RTO: 0.2 RATIO — SIGNIFICANT CHANGE UP (ref 0–0.2)
PROTHROM AB SERPL-ACNC: 12.2 SEC — SIGNIFICANT CHANGE UP (ref 10.5–13.4)
RBC # BLD: 3.33 M/UL — LOW (ref 3.8–5.2)
RBC # FLD: 19.5 % — HIGH (ref 10.3–14.5)
RBC CASTS # UR COMP ASSIST: 1 /HPF — SIGNIFICANT CHANGE UP (ref 0–4)
SODIUM SERPL-SCNC: 141 MMOL/L — SIGNIFICANT CHANGE UP (ref 135–145)
SODIUM SERPL-SCNC: 142 MMOL/L — SIGNIFICANT CHANGE UP (ref 135–145)
SODIUM UR-SCNC: 63 MMOL/L — SIGNIFICANT CHANGE UP
SP GR SPEC: 1.01 — SIGNIFICANT CHANGE UP (ref 1.01–1.02)
UROBILINOGEN FLD QL: NEGATIVE — SIGNIFICANT CHANGE UP
UUN UR-MCNC: 505 MG/DL — SIGNIFICANT CHANGE UP
WBC # BLD: 8.63 K/UL — SIGNIFICANT CHANGE UP (ref 3.8–10.5)
WBC # FLD AUTO: 8.63 K/UL — SIGNIFICANT CHANGE UP (ref 3.8–10.5)
WBC UR QL: 7 /HPF — HIGH (ref 0–5)

## 2023-05-16 PROCEDURE — 99233 SBSQ HOSP IP/OBS HIGH 50: CPT | Mod: GC

## 2023-05-16 RX ORDER — SODIUM CHLORIDE 9 MG/ML
1000 INJECTION INTRAMUSCULAR; INTRAVENOUS; SUBCUTANEOUS
Refills: 0 | Status: DISCONTINUED | OUTPATIENT
Start: 2023-05-16 | End: 2023-05-16

## 2023-05-16 RX ORDER — APIXABAN 2.5 MG/1
2.5 TABLET, FILM COATED ORAL EVERY 12 HOURS
Refills: 0 | Status: DISCONTINUED | OUTPATIENT
Start: 2023-05-16 | End: 2023-05-17

## 2023-05-16 RX ADMIN — POLYETHYLENE GLYCOL 3350 17 GRAM(S): 17 POWDER, FOR SOLUTION ORAL at 13:01

## 2023-05-16 RX ADMIN — INSULIN GLARGINE 18 UNIT(S): 100 INJECTION, SOLUTION SUBCUTANEOUS at 21:37

## 2023-05-16 RX ADMIN — Medication 7 UNIT(S): at 17:44

## 2023-05-16 RX ADMIN — GABAPENTIN 200 MILLIGRAM(S): 400 CAPSULE ORAL at 12:59

## 2023-05-16 RX ADMIN — SPIRONOLACTONE 12.5 MILLIGRAM(S): 25 TABLET, FILM COATED ORAL at 05:53

## 2023-05-16 RX ADMIN — Medication 325 MILLIGRAM(S): at 12:58

## 2023-05-16 RX ADMIN — SODIUM CHLORIDE 50 MILLILITER(S): 9 INJECTION INTRAMUSCULAR; INTRAVENOUS; SUBCUTANEOUS at 10:55

## 2023-05-16 RX ADMIN — Medication 100 MILLIGRAM(S): at 05:54

## 2023-05-16 RX ADMIN — SENNA PLUS 2 TABLET(S): 8.6 TABLET ORAL at 21:38

## 2023-05-16 RX ADMIN — Medication 100 MILLIGRAM(S): at 17:44

## 2023-05-16 RX ADMIN — PANTOPRAZOLE SODIUM 40 MILLIGRAM(S): 20 TABLET, DELAYED RELEASE ORAL at 05:54

## 2023-05-16 RX ADMIN — Medication 3 MILLILITER(S): at 05:54

## 2023-05-16 RX ADMIN — ATORVASTATIN CALCIUM 40 MILLIGRAM(S): 80 TABLET, FILM COATED ORAL at 21:37

## 2023-05-16 RX ADMIN — Medication 1 TABLET(S): at 12:58

## 2023-05-16 RX ADMIN — BUMETANIDE 1 MILLIGRAM(S): 0.25 INJECTION INTRAMUSCULAR; INTRAVENOUS at 05:54

## 2023-05-16 RX ADMIN — APIXABAN 2.5 MILLIGRAM(S): 2.5 TABLET, FILM COATED ORAL at 17:44

## 2023-05-16 RX ADMIN — Medication 3 MILLILITER(S): at 12:59

## 2023-05-16 RX ADMIN — Medication 1: at 17:45

## 2023-05-16 RX ADMIN — Medication 81 MILLIGRAM(S): at 12:58

## 2023-05-16 RX ADMIN — CHLORHEXIDINE GLUCONATE 1 APPLICATION(S): 213 SOLUTION TOPICAL at 05:54

## 2023-05-16 RX ADMIN — Medication 3 MILLILITER(S): at 17:48

## 2023-05-16 NOTE — PROGRESS NOTE ADULT - PROBLEM SELECTOR PLAN 5
Currently at baseline 1.5-1.7  slight increase to high 1.9 range  -nephrology following and stating still within normal kidney function Normocytic anemia - likely 2/2 CKD  - Iron studies consistent with MILLY - will start iron supplementation  - Per nephro, started on darbepoitin weekly  - Bowel regimen

## 2023-05-16 NOTE — PROGRESS NOTE ADULT - SUBJECTIVE AND OBJECTIVE BOX
Sultan Sixto MD  PGY 1 Department of Internal Medicine        Patient is a 83y old  Female who presents with a chief complaint of Shortness of breath (15 May 2023 15:07)      SUBJECTIVE / OVERNIGHT EVENTS: Pt seen and examined. No acute overnight events. Denies fevers, chills, CP, SOB, Abdominal pain, N/V, Constipation, Diarrhea        MEDICATIONS  (STANDING):  albuterol/ipratropium for Nebulization 3 milliLiter(s) Nebulizer every 6 hours  aspirin  chewable 81 milliGRAM(s) Oral daily  atorvastatin 40 milliGRAM(s) Oral at bedtime  buMETAnide 1 milliGRAM(s) Oral two times a day  chlorhexidine 2% Cloths 1 Application(s) Topical <User Schedule>  darbepoetin Injectable ViaL 40 MICROGram(s) SubCutaneous every 7 days  dextrose 5%. 1000 milliLiter(s) (50 mL/Hr) IV Continuous <Continuous>  dextrose 5%. 1000 milliLiter(s) (100 mL/Hr) IV Continuous <Continuous>  dextrose 50% Injectable 25 Gram(s) IV Push once  dextrose 50% Injectable 25 Gram(s) IV Push once  dextrose 50% Injectable 12.5 Gram(s) IV Push once  ferrous    sulfate 325 milliGRAM(s) Oral daily  gabapentin 200 milliGRAM(s) Oral daily  glucagon  Injectable 1 milliGRAM(s) IntraMuscular once  insulin glargine Injectable (LANTUS) 18 Unit(s) SubCutaneous at bedtime  insulin lispro (ADMELOG) corrective regimen sliding scale   SubCutaneous at bedtime  insulin lispro (ADMELOG) corrective regimen sliding scale   SubCutaneous three times a day before meals  insulin lispro Injectable (ADMELOG) 7 Unit(s) SubCutaneous three times a day with meals  metoprolol tartrate 100 milliGRAM(s) Oral every 12 hours  Nephro-ashley 1 Tablet(s) Oral daily  pantoprazole    Tablet 40 milliGRAM(s) Oral before breakfast  polyethylene glycol 3350 17 Gram(s) Oral daily  senna 2 Tablet(s) Oral at bedtime  spironolactone 12.5 milliGRAM(s) Oral daily    MEDICATIONS  (PRN):  acetaminophen     Tablet .. 650 milliGRAM(s) Oral every 6 hours PRN Temp greater or equal to 38C (100.4F), Mild Pain (1 - 3)  dextrose Oral Gel 15 Gram(s) Oral once PRN Blood Glucose LESS THAN 70 milliGRAM(s)/deciliter  melatonin 3 milliGRAM(s) Oral at bedtime PRN Insomnia      I&O's Summary    14 May 2023 07:01  -  15 May 2023 07:00  --------------------------------------------------------  IN: 610 mL / OUT: 400 mL / NET: 210 mL    15 May 2023 07:01  -  16 May 2023 05:31  --------------------------------------------------------  IN: 440 mL / OUT: 950 mL / NET: -510 mL        Vital Signs Last 24 Hrs  T(C): 37 (16 May 2023 04:00), Max: 37 (16 May 2023 04:00)  T(F): 98.6 (16 May 2023 04:00), Max: 98.6 (16 May 2023 04:00)  HR: 69 (16 May 2023 04:00) (69 - 71)  BP: 110/53 (16 May 2023 04:00) (101/62 - 115/74)  BP(mean): --  RR: 17 (16 May 2023 04:00) (17 - 18)  SpO2: 100% (16 May 2023 04:00) (88% - 100%)    Parameters below as of 16 May 2023 04:00  Patient On (Oxygen Delivery Method): nasal cannula  O2 Flow (L/min): 2      CAPILLARY BLOOD GLUCOSE      POCT Blood Glucose.: 185 mg/dL (15 May 2023 21:11)  POCT Blood Glucose.: 207 mg/dL (15 May 2023 17:23)  POCT Blood Glucose.: 181 mg/dL (15 May 2023 11:55)  POCT Blood Glucose.: 115 mg/dL (15 May 2023 07:37)      PHYSICAL EXAM:  CONSTITUTIONAL: NAD  HEENT: NC/AT  RESPIRATORY: mild crackles b/l  CARDIOVASCULAR: Regular rate and rhythm, normal S1 and S2, no murmur/rub/gallop; No lower extremity edema; Peripheral pulses are 2+ bilaterally  ABDOMEN: Nontender to palpation, normoactive bowel sounds, no rebound/guarding; No hepatosplenomegaly  MUSCLOSKELETAL: no clubbing or cyanosis of digits; no joint swelling or tenderness to palpation  EXTREMITIES: no peripheral edema, distal pulses intact   NEURO: no focal deficits   PSYCH: A+O to person, place, and time; affect appropriate     LABS:                        9.4    8.63  )-----------( 304      ( 16 May 2023 04:36 )             32.1     Auto Eosinophil # 0.10  / Auto Eosinophil % 1.2   / Auto Neutrophil # 6.04  / Auto Neutrophil % 70.1  / BANDS % x                            10.6   9.88  )-----------( 353      ( 15 May 2023 19:31 )             35.6     Auto Eosinophil # x     / Auto Eosinophil % x     / Auto Neutrophil # x     / Auto Neutrophil % x     / BANDS % x                            10.6   9.19  )-----------( 351      ( 15 May 2023 06:55 )             36.3     Auto Eosinophil # 0.08  / Auto Eosinophil % 0.9   / Auto Neutrophil # 6.76  / Auto Neutrophil % 73.6  / BANDS % x        05-16    141  |  97  |  43<H>  ----------------------------<  160<H>  4.4   |  31  |  2.35<H>  05-15    138  |  94<L>  |  39<H>  ----------------------------<  102<H>  4.5   |  34<H>  |  1.87<H>  05-14    138  |  95<L>  |  40<H>  ----------------------------<  173<H>  4.2   |  32<H>  |  1.92<H>    Ca    9.4      16 May 2023 04:36  Mg     1.8     05-16  Phos  4.0     05-16  TPro  6.6  /  Alb  3.4  /  TBili  0.2  /  DBili  x   /  AST  18  /  ALT  15  /  AlkPhos  76  05-16  TPro  7.2  /  Alb  3.4  /  TBili  0.3  /  DBili  x   /  AST  20  /  ALT  14  /  AlkPhos  83  05-15  TPro  7.0  /  Alb  3.5  /  TBili  0.2  /  DBili  x   /  AST  18  /  ALT  17  /  AlkPhos  76  05-14    PT/INR - ( 16 May 2023 04:36 )   PT: 12.2 sec;   INR: 1.05 ratio         PTT - ( 16 May 2023 04:36 )  PTT:72.0 sec              RADIOLOGY & ADDITIONAL TESTS:    Imaging Personally Reviewed:    Consultant(s) Notes Reviewed:      Care Discussed with Consultants/Other Providers:   Sultan Sixto MD  PGY 1 Department of Internal Medicine        Patient is a 83y old  Female who presents with a chief complaint of Shortness of breath (15 May 2023 15:07)      SUBJECTIVE / OVERNIGHT EVENTS: Pt seen and examined. No acute overnight events. Denies fevers, chills, CP, SOB, Abdominal pain, N/V, Constipation, Diarrhea. Notes her breathing is fine and has no pain. Appears more tired today. Tele NSR for the most part. Brief episode of PAT not documented or reported.         MEDICATIONS  (STANDING):  albuterol/ipratropium for Nebulization 3 milliLiter(s) Nebulizer every 6 hours  aspirin  chewable 81 milliGRAM(s) Oral daily  atorvastatin 40 milliGRAM(s) Oral at bedtime  buMETAnide 1 milliGRAM(s) Oral two times a day  chlorhexidine 2% Cloths 1 Application(s) Topical <User Schedule>  darbepoetin Injectable ViaL 40 MICROGram(s) SubCutaneous every 7 days  dextrose 5%. 1000 milliLiter(s) (50 mL/Hr) IV Continuous <Continuous>  dextrose 5%. 1000 milliLiter(s) (100 mL/Hr) IV Continuous <Continuous>  dextrose 50% Injectable 25 Gram(s) IV Push once  dextrose 50% Injectable 25 Gram(s) IV Push once  dextrose 50% Injectable 12.5 Gram(s) IV Push once  ferrous    sulfate 325 milliGRAM(s) Oral daily  gabapentin 200 milliGRAM(s) Oral daily  glucagon  Injectable 1 milliGRAM(s) IntraMuscular once  insulin glargine Injectable (LANTUS) 18 Unit(s) SubCutaneous at bedtime  insulin lispro (ADMELOG) corrective regimen sliding scale   SubCutaneous at bedtime  insulin lispro (ADMELOG) corrective regimen sliding scale   SubCutaneous three times a day before meals  insulin lispro Injectable (ADMELOG) 7 Unit(s) SubCutaneous three times a day with meals  metoprolol tartrate 100 milliGRAM(s) Oral every 12 hours  Nephro-ashley 1 Tablet(s) Oral daily  pantoprazole    Tablet 40 milliGRAM(s) Oral before breakfast  polyethylene glycol 3350 17 Gram(s) Oral daily  senna 2 Tablet(s) Oral at bedtime  spironolactone 12.5 milliGRAM(s) Oral daily    MEDICATIONS  (PRN):  acetaminophen     Tablet .. 650 milliGRAM(s) Oral every 6 hours PRN Temp greater or equal to 38C (100.4F), Mild Pain (1 - 3)  dextrose Oral Gel 15 Gram(s) Oral once PRN Blood Glucose LESS THAN 70 milliGRAM(s)/deciliter  melatonin 3 milliGRAM(s) Oral at bedtime PRN Insomnia      I&O's Summary    14 May 2023 07:01  -  15 May 2023 07:00  --------------------------------------------------------  IN: 610 mL / OUT: 400 mL / NET: 210 mL    15 May 2023 07:01  -  16 May 2023 05:31  --------------------------------------------------------  IN: 440 mL / OUT: 950 mL / NET: -510 mL        Vital Signs Last 24 Hrs  T(C): 37 (16 May 2023 04:00), Max: 37 (16 May 2023 04:00)  T(F): 98.6 (16 May 2023 04:00), Max: 98.6 (16 May 2023 04:00)  HR: 69 (16 May 2023 04:00) (69 - 71)  BP: 110/53 (16 May 2023 04:00) (101/62 - 115/74)  BP(mean): --  RR: 17 (16 May 2023 04:00) (17 - 18)  SpO2: 100% (16 May 2023 04:00) (88% - 100%)    Parameters below as of 16 May 2023 04:00  Patient On (Oxygen Delivery Method): nasal cannula  O2 Flow (L/min): 2      CAPILLARY BLOOD GLUCOSE      POCT Blood Glucose.: 185 mg/dL (15 May 2023 21:11)  POCT Blood Glucose.: 207 mg/dL (15 May 2023 17:23)  POCT Blood Glucose.: 181 mg/dL (15 May 2023 11:55)  POCT Blood Glucose.: 115 mg/dL (15 May 2023 07:37)      PHYSICAL EXAM:  CONSTITUTIONAL: NAD  HEENT: NC/AT  RESPIRATORY: mild crackles b/l  CARDIOVASCULAR: Regular rate and rhythm, normal S1 and S2, no murmur/rub/gallop; Minimal lower extremity edema; Peripheral pulses are 2+ bilaterally  ABDOMEN: Nontender to palpation, normoactive bowel sounds, no rebound/guarding; No hepatosplenomegaly. Abdominal bruit heard today, most prominent on left side  MUSCLOSKELETAL: no clubbing or cyanosis of digits; no joint swelling or tenderness to palpation  EXTREMITIES: minimal LE edema, distal pulses intact   NEURO: no focal deficits   PSYCH: A+O to person, place, and time; affect appropriate     LABS:                        9.4    8.63  )-----------( 304      ( 16 May 2023 04:36 )             32.1     Auto Eosinophil # 0.10  / Auto Eosinophil % 1.2   / Auto Neutrophil # 6.04  / Auto Neutrophil % 70.1  / BANDS % x                            10.6   9.88  )-----------( 353      ( 15 May 2023 19:31 )             35.6     Auto Eosinophil # x     / Auto Eosinophil % x     / Auto Neutrophil # x     / Auto Neutrophil % x     / BANDS % x                            10.6   9.19  )-----------( 351      ( 15 May 2023 06:55 )             36.3     Auto Eosinophil # 0.08  / Auto Eosinophil % 0.9   / Auto Neutrophil # 6.76  / Auto Neutrophil % 73.6  / BANDS % x        05-16    141  |  97  |  43<H>  ----------------------------<  160<H>  4.4   |  31  |  2.35<H>  05-15    138  |  94<L>  |  39<H>  ----------------------------<  102<H>  4.5   |  34<H>  |  1.87<H>  05-14    138  |  95<L>  |  40<H>  ----------------------------<  173<H>  4.2   |  32<H>  |  1.92<H>    Ca    9.4      16 May 2023 04:36  Mg     1.8     05-16  Phos  4.0     05-16  TPro  6.6  /  Alb  3.4  /  TBili  0.2  /  DBili  x   /  AST  18  /  ALT  15  /  AlkPhos  76  05-16  TPro  7.2  /  Alb  3.4  /  TBili  0.3  /  DBili  x   /  AST  20  /  ALT  14  /  AlkPhos  83  05-15  TPro  7.0  /  Alb  3.5  /  TBili  0.2  /  DBili  x   /  AST  18  /  ALT  17  /  AlkPhos  76  05-14    PT/INR - ( 16 May 2023 04:36 )   PT: 12.2 sec;   INR: 1.05 ratio         PTT - ( 16 May 2023 04:36 )  PTT:72.0 sec              RADIOLOGY & ADDITIONAL TESTS:    Imaging Personally Reviewed:    Consultant(s) Notes Reviewed:      Care Discussed with Consultants/Other Providers:

## 2023-05-16 NOTE — PROGRESS NOTE ADULT - ASSESSMENT
Ms. Batista is a 82 YO woman with PMH of HTN, HLD, DM2, HFpEF (EF 65%), CAD s/p CABG, Breast CA s/p R partial mastectomy, rectal CA s/p resection with previous admission for acute rissa s/p ERCP w/ stent c/b gallbladder perf w/ perc rissa and recurrent SVT, and recent admission for CHF exacerbation presenting for shortness of breath.----out patient nephrologist Dr Kan .    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 pt is  euvolemic   hypervolemia - pt is  euvolemic   Electrolytes - controlled  Anemia- -ckd related  also consistent  iron deficiency , tsat 7, ferritin 28---sp iv venofer  5 doses completed on 4/3023   GI- ERCP on hold due to volume status ----- improved euvolemic   met alkalosis --- in the view of diuretic --- cont to monitor   a fib --  SP heparin drip----- on eliquis   CAD;; EF 62  %  , HRC -- -- 5/10/23      RECOMMENDATIONS  -cr up trend seen  -please get bladder scan  - hold bumex for now, -check orthostatic , urine lytes  -bmp at 2 pm   - Cont spironolactone 12.5mg po daily ( anti ryan + diuretic )   - Keep k ~4 and mag 2  - Dose all medications for GFR ~25 ml/min  - Monitor I/Os and daily weights-- euvolemic   -  cont daily oral iron , weekly Aranesp 40 mcg SQ .              d-w primary team   Brenda Rader  [facility]  Office: [phone]

## 2023-05-16 NOTE — PROVIDER CONTACT NOTE (OTHER) - RECOMMENDATIONS
Resident Leon Lr made aware
Notify provider
MD assess pt at bedside- EKG and cardiac enzymes order
Ashley Zarate notified.
Resident Leon Lr made aware. resident to bedside, using ongoing Lithuanian  Aurelio.
Ashley Zarate MD notified
Will continue to monitor the patient.
no

## 2023-05-16 NOTE — PROVIDER CONTACT NOTE (CRITICAL VALUE NOTIFICATION) - ASSESSMENT
No bleeding noted, Vital signs are stable and recorded in flowsheet
Pt AOx4, [place] speaking. VSS on 2L NC. No signs of chest pain, SOB or distress at this time
no s/s of bleeding

## 2023-05-16 NOTE — PROGRESS NOTE ADULT - PROBLEM SELECTOR PLAN 8
DVT: Per daughter, history of VTE, and history of afib, will hold Eliquis for possible procedure and start heparin drip  Dispo: PT rec outpatient PT  Diet: CC/DASH diet DVT: Per daughter, history of VTE, and history of afib, will hold Eliquis for possible procedure and start heparin drip; resume Eliquis before dc  Dispo: PT rec outpatient PT  Diet: CC/DASH diet

## 2023-05-16 NOTE — CHART NOTE - NSCHARTNOTESELECT_GEN_ALL_CORE
Event Note
Event Note
GI/Event Note
Event Note
GI/Event Note
GI/Event Note
Home O2 Need
Home O2 Need/Event Note
Nutrition Services

## 2023-05-16 NOTE — PROVIDER CONTACT NOTE (OTHER) - REASON
Pt had PAT up to 120 for 10 seconds
PAF ,HR up to 145 x 23 seconds
PAT 2.6 sec with HR up to 145
pt with chest pain
converted to aflutter sustaining 140s HR
patient had PAT on tele monitor for 6.1 seconds with HR- 140-150.
atach x1min 19 sec up to 150s

## 2023-05-16 NOTE — PROGRESS NOTE ADULT - SUBJECTIVE AND OBJECTIVE BOX
DATE OF SERVICE: 05-16-23 @ 12:09    Patient is a 83y old  Female who presents with a chief complaint of Shortness of breath (16 May 2023 08:35)      INTERVAL HISTORY:     REVIEW OF SYSTEMS:  CONSTITUTIONAL: No weakness  EYES/ENT: No visual changes;  No throat pain   NECK: No pain or stiffness  RESPIRATORY: No cough, wheezing; No shortness of breath  CARDIOVASCULAR: No chest pain or palpitations  GASTROINTESTINAL: No abdominal  pain. No nausea, vomiting, or hematemesis  GENITOURINARY: No dysuria, frequency or hematuria  NEUROLOGICAL: No stroke like symptoms  SKIN: No rashes    TELEMETRY Personally reviewed: SR , PAT for approx 2.6sec  140  	  MEDICATIONS:  metoprolol tartrate 100 milliGRAM(s) Oral every 12 hours  spironolactone 12.5 milliGRAM(s) Oral daily        PHYSICAL EXAM:  T(C): 36.8 (05-16-23 @ 11:18), Max: 37 (05-16-23 @ 04:00)  HR: 64 (05-16-23 @ 11:18) (64 - 71)  BP: 132/83 (05-16-23 @ 11:18) (101/62 - 132/83)  RR: 18 (05-16-23 @ 11:18) (17 - 18)  SpO2: 100% (05-16-23 @ 11:18) (98% - 100%)  Wt(kg): --  I&O's Summary    15 May 2023 07:01  -  16 May 2023 07:00  --------------------------------------------------------  IN: 440 mL / OUT: 950 mL / NET: -510 mL    16 May 2023 07:01  -  16 May 2023 12:09  --------------------------------------------------------  IN: 0 mL / OUT: 400 mL / NET: -400 mL          Appearance: In no distress	  HEENT:    PERRL, EOMI	  Cardiovascular:  S1 S2, No JVD  Respiratory: Lungs clear to auscultation	  Gastrointestinal:  Soft, Non-tender, + BS	  Vascularature:  No edema of LE  Psychiatric: Appropriate affect   Neuro: no acute focal deficits                               9.4    8.63  )-----------( 304      ( 16 May 2023 04:36 )             32.1     05-16    141  |  97  |  43<H>  ----------------------------<  160<H>  4.4   |  31  |  2.35<H>    Ca    9.4      16 May 2023 04:36  Phos  4.0     05-16  Mg     1.8     05-16    TPro  6.6  /  Alb  3.4  /  TBili  0.2  /  DBili  x   /  AST  18  /  ALT  15  /  AlkPhos  76  05-16        Labs personally reviewed      ASSESSMENT/PLAN: 	  Ms. Batista is a 84 YO woman with PMH of HTN, HLD, DM2, HFpEF (EF 65%), CAD s/p CABG, Breast CA s/p R partial mastectomy, rectal CA s/p resection with recent admission for acute rissa s/p ERCP w/ stent c/b gallbladder perf w/ perc rissa and recurrent SVT, presenting for shortness of breath. Denies CP, palpitations, orthopnea or syncope. Follows as OP with Dr. Hagan.    Problem/Plan -1  Problem: Acute on Chronic Diastolic CHF  - Prior TTE 3/23 shows preserved EF, hyperdynamic LV function, basal inferoseptum hypokinesis, decreased RV systolic function severe pulm pressures, B/L pleural effusions  - Repeat TTE 5/6 Normal left ventricular cavity size. The left ventricular wall thickness is normal. The left ventricular systolic function is normal, EF 62%. No RWA. Mod PH. PAS pressure 56 mmHg.  - RHC (5/10) with normal PCWP and CI, moderate-severe pulm HTN  - V/Q no PE (5/12)   - CT chest (5/1) with small pleural effusions and mosaic attenuation of the lungs suggestive of fluid overload, not significantly changed from 2/3/23  - CXR (5/12) clear lungs   - Creatinine uptrending 2.35<--1.87   - Continue spironolactone 12.5mg PO daily     Problem/Plan -2  Problem: CAD   - s/p CABG 2020  - ECG with no ischemia noted  - Trop 21  - c/w ASA, statin, and metoprolol     Problem/Plan -3  Problem: hx of Recurrent SVT  - c/w Metoprolol 100mg PO BID  - cont to monitor on tele    Problem/Plan -4  Problem: Atrial Fibrillation  - Patient with n/o A-flutter on previous admission  - c/w Metoprolol   - Eliquis 2.5mg PO BID on hold   - c/w Heparin gtt    Problem/Plan- 5  Problem: Hx of Cholecystitis, preop Risk Stratification   - Euvolemic, optimized from cardiac standpoint  - elevated risk for mod risk ERCP, no cardiac contraindication to proceed        NARGIS Smith, [facility]  Cardiovascular Medicine  [address] [place] [address], [address]  Available through call or text on [employer]  Office: [phone]

## 2023-05-16 NOTE — PROVIDER CONTACT NOTE (OTHER) - BACKGROUND
83 year old female admitted for heart failure, CKD, anemia. PMH of HTN, HL,D CHF.
Admitted for CHF, On heparin drip for DVT.
83 year old female admitted for heart failure exacerbation. PMH CHF, Type 2 DM, breast CA, HLD HTN.
Dx: HF exacerbation  Hx afib on eliquis. now on hep gtt pending procedure after oxygen needs cleared  not on home O2, on 2L NC now
Pt. admitted for chf exacerbation
Pt diagnosed with heart failure
Dx: HF exacerbation    Hx: atach for 6 seconds

## 2023-05-16 NOTE — PROVIDER CONTACT NOTE (OTHER) - DATE AND TIME:
LEAVING AGAINST MEDICAL ADVICE      MRN: [id number]      Physicians name: Giselle Kincaid MD    This is to certify that I,Gabino Ta. a patient at [facility] Refused Transport to ER via Ambulance    I acknowledge that I have been informed of the risks involved in leaving [facility] and release [facility], its agents, the employees and all members of its medical staff and their assistants in any way connected with me as a patient from liability for any ill effects which may result.            _______________________________      ____________________  Patient Signature                                         Date          _______________________________      ____________________  Physician                                                    Witness        
01-May-2023 03:01
01-May-2023 05:38
06-May-2023 03:09
07-May-2023 23:52
16-May-2023 07:45
29-Apr-2023 12:30
05-May-2023 14:25

## 2023-05-16 NOTE — PROVIDER CONTACT NOTE (CRITICAL VALUE NOTIFICATION) - ACTION/TREATMENT ORDERED:
ACP Sultan Henson made aware. Will continue to monitor
No new orders at this time
As per the Full anticoagulation heparin nomogram, the heparin drip will be held for 60 minutes and the rate decrease by 300 cc/hr.

## 2023-05-16 NOTE — PROGRESS NOTE ADULT - ATTENDING COMMENTS
83 year old Albanian speaking female with PMH HTN, HLD, DM2, heart failure with preserved EF, CAD S/P CABG, breast CA and rectal CA who presented with shortness of breath due to CHF exacerbation. She was initially diuresed with IV Bumex but has been transitioned to PO per cardiology recs. She was on Bipap but has been weaned to nasal cannula. Despite diuresis, the patient is unable to be weaned off of oxygen. Pulmonary consult was placed and it was recommended that the patient undergo a RHC which took place 5/10 and was significant for PH. A V/Q was also recommended which showed low probability of PE. Due to her PH she will need to be discharged with home oxygen. The patient had choledocholithiasis s/p plastic biliary stent 1/20 and was due for repeat ERCP with stent exhange 4/26 as an outpatient however, she was admitted to the hospital. She has been cleared by cardiology and pulmonology to have the ERCP done, she is tentatively added on for today. Today, the patient has a new mild TAO with creatinine increase from 1.87 to 2.35. Her bladder scan was negative for retention. Her Bumex has been stopped and she has been started on some fluids while waiting for her ERCP. Her urine electrolytes are pending, will follow up results. Rest of plan as above.

## 2023-05-16 NOTE — PROGRESS NOTE ADULT - PROBLEM SELECTOR PLAN 1
ProBNP 2505, TTE 3/23 with EF 76%, severe pulmonary hypertension, moderate diastolic dysfunction. POCUS done in ED cw pulmonary edema  - CT chest 5/1 with unchanged pulmonary edema/effusions  - Per cardiology transition bumex 1mg IV BID to bumex 1 mg PO BID on 5/8  - c/w aldactone 12.5mg daily   - Strict I&Os (net negative past 24 hours)  - Daily weights (neg 4 kg since admission)  - cardiology stating pt is now euvolemic; rec pulm eval -> f/u RHC results  - pulm rec V/Q scan -> WNL, optimized from pulm perspective Baseline appears to be 1.5-1.9  Increase to 2.3 5/16. Does not appear volume overloaded so concern for overdiuresis. Also w/ new abdominal bruit on exam  -nephrology following  -will check urine lytes, PVR, and orthostatics  -consider VA duplex renal US to assess for YSABEL Baseline appears to be 1.5-1.9  Increase to 2.3 5/16. Does not appear volume overloaded so concern for overdiuresis. Also w/ new abdominal bruit on exam  -nephrology following  -will check urine lytes, PVR, and orthostatics  -light maintenance fluids while NPO Baseline appears to be 1.5-1.9  Increase to 2.3 5/16. Does not appear volume overloaded so concern for overdiuresis. Also w/ new abdominal bruit on exam  -nephrology following  -will check urine lytes, PVR not retaining, and orthostatics neg  -light maintenance fluids while NPO  -f/u BMP

## 2023-05-16 NOTE — PROGRESS NOTE ADULT - SUBJECTIVE AND OBJECTIVE BOX
Nephrology Progress Note    Patient is a 83y female sitting in chair on 2 liter NC  no distress  Allergies:  CT scan dye (Hives)  penicillin (Unknown)    Hospital Medications:   MEDICATIONS  (STANDING):  albuterol/ipratropium for Nebulization 3 milliLiter(s) Nebulizer every 6 hours  aspirin  chewable 81 milliGRAM(s) Oral daily  atorvastatin 40 milliGRAM(s) Oral at bedtime  buMETAnide 1 milliGRAM(s) Oral two times a day  chlorhexidine 2% Cloths 1 Application(s) Topical <User Schedule>  darbepoetin Injectable ViaL 40 MICROGram(s) SubCutaneous every 7 days  dextrose 5%. 1000 milliLiter(s) (50 mL/Hr) IV Continuous <Continuous>  dextrose 5%. 1000 milliLiter(s) (100 mL/Hr) IV Continuous <Continuous>  dextrose 50% Injectable 25 Gram(s) IV Push once  dextrose 50% Injectable 25 Gram(s) IV Push once  dextrose 50% Injectable 12.5 Gram(s) IV Push once  ferrous    sulfate 325 milliGRAM(s) Oral daily  gabapentin 200 milliGRAM(s) Oral daily  glucagon  Injectable 1 milliGRAM(s) IntraMuscular once  heparin  Infusion. 1200 Unit(s)/Hr (12 mL/Hr) IV Continuous <Continuous>  insulin glargine Injectable (LANTUS) 18 Unit(s) SubCutaneous at bedtime  insulin lispro (ADMELOG) corrective regimen sliding scale   SubCutaneous three times a day before meals  insulin lispro (ADMELOG) corrective regimen sliding scale   SubCutaneous at bedtime  insulin lispro Injectable (ADMELOG) 7 Unit(s) SubCutaneous three times a day with meals  metoprolol tartrate 100 milliGRAM(s) Oral every 12 hours  Nephro-ashley 1 Tablet(s) Oral daily  pantoprazole    Tablet 40 milliGRAM(s) Oral before breakfast  polyethylene glycol 3350 17 Gram(s) Oral daily  senna 2 Tablet(s) Oral at bedtime  spironolactone 12.5 milliGRAM(s) Oral daily    VITALS:  T(F): 97.8 (23 @ 04:24), Max: 98.2 (23 @ 20:17)  HR: 76 (23 @ 04:24)  BP: 110/60 (23 @ 04:24)  RR: 18 (23 @ 04:24)  SpO2: 97% (23 @ 04:24)       @ 07:01  -   @ 07:00  --------------------------------------------------------  IN: 1116 mL / OUT: 1250 mL / NET: -134 mL     @ 07:  -   @ 07:00  --------------------------------------------------------  IN: 450 mL / OUT: 600 mL / NET: -150 mL        PHYSICAL EXAM:  Constitutional: NAD  HEENT: anicteric sclera, oropharynx clear, MMM  Neck: No JVD  Respiratory: CTAB, no wheezes, rales or rhonchi  Cardiovascular: S1, S2, RRR  Gastrointestinal: BS+, soft, NT/ND  Extremities: + edema  Neurological: A/O x 3, no focal deficits  Psychiatric: Normal mood, normal affect  : No CVA tenderness. No chen.   Skin: no rash      LABS:      137  |  94<L>  |  40<H>  ----------------------------<  158<H>  4.1   |  30  |  1.90<H>    Ca    9.5      13 May 2023 06:56  Phos  4.3       Mg     1.8         TPro  7.1  /  Alb  3.6  /  TBili  0.2  /  DBili      /  AST  16  /  ALT  13  /  AlkPhos  78                            9.4    8.33  )-----------( 308      ( 13 May 2023 06:58 )             32.3       Urine Studies:  Urinalysis Basic - ( 08 May 2023 15:14 )    Color: Yellow / Appearance: Slightly Turbid / S.012 / pH:   Gluc:  / Ketone: Negative  / Bili: Negative / Urobili: Negative   Blood:  / Protein: Trace / Nitrite: Negative   Leuk Esterase: Large / RBC: 6 /hpf /  /HPF   Sq Epi:  / Non Sq Epi:  / Bacteria: Many

## 2023-05-16 NOTE — PROGRESS NOTE ADULT - NS ATTEND OPT1 GEN_ALL_CORE

## 2023-05-16 NOTE — PROVIDER CONTACT NOTE (OTHER) - ASSESSMENT
/71, HR 71, RR 18, 96% room air- pt. is diaphoretic
Patient A & O x 4. Asymptomatic. Denies chest pain, palpitations or any other chest discomfort. BP- 127/73, HR- 80's.
VSS: /56, HR 68, temp 98.1, RR 18, SpO2 95% on 1LNC
pt a/o4 on 1L NC @ time. after changing pt diaper/purewick - pt stated she was having a little trouble breathing. tele tech shortly notified RN of rhythm conversion & HR. all other VSS,  immediately iniated. pt c/o L arm pain from blood pressure cuff site- asked for 2 Tylenol. pt also c/o fatique/SOB post changing. pt states she always feels like this after changing.
pt is asymptomatic, denies cp, SOB, palps. VSS. remains baseline A/O4 on 2 L, weaned to 1 L with VS q8 per order
Pt AOx4, Citizen of the [place] speaking. VSS on 2L NC. No signs of chest pain, SOB or distress at this time.
Patient A&Ox4, VSS, denies chest pain , SOB, palpitations, on 1 L O2.

## 2023-05-16 NOTE — PROVIDER CONTACT NOTE (OTHER) - ACTION/TREATMENT ORDERED:
EKG done; provider reviewed. Monitoring in progress.
EKG. gathering equipment for EKG, pt converts back to SR 80s. provider says he no longer needs the EKG unless pt converts back to aflutter. resident requested for pt to be placed back on 2L d/t BHAT
Provider Ashley Zarate notified. Continue to monitor patient on tele.
ACP Sultan Henson made aware. Will continue to monitor.
No new orders now. Will continue to monitor the patient.
no intervention at this time
MD assess pt at bedside- EKG and cardiac enzymes ordered- continue to monitor.

## 2023-05-16 NOTE — CHART NOTE - NSCHARTNOTEFT_GEN_A_CORE
Brief GI update  ==========  Procedure postponed due to suite availability. Please reach out to GI once discharge is planned so we can accommodate an ERCP (likely as outpatient).    Thank you for involving us in the care of this patient. Please reach out if any further questions.    Sam Melton, PGY-6  Gastroenterology/Hepatology Fellow    Available on [employer] Teams  After 5PM/Weekends, please contact the on-call GI fellow: [phone]

## 2023-05-16 NOTE — PROGRESS NOTE ADULT - PROBLEM SELECTOR PLAN 3
- ERCP was planned for outpatient 4/26  - GI aware, will await improvement in respiratory function  - once pulm and cards documents clearance, will reach out to GI  - no plan for inpt intervention as documented by GI; f/u outpt - Currently on 2L NC  - goal sat 92-95%  - Not on home O2  - Duonebs q6 for wheezes  - Incentive spirometry  - will have pulm evaluate patient given mod PH as a possible etiology of her hypoxia -> potential RHC 5/9  - will setup home O2

## 2023-05-16 NOTE — PROVIDER CONTACT NOTE (OTHER) - NAME OF MD/NP/PA/DO NOTIFIED:
NITA Henson
Shauna Munoz MD
Resident Leon Lr
Ashley Zarate
Resident Leon Lr
MD_ Shauna Munoz
Ashley Zarate MD

## 2023-05-16 NOTE — PROGRESS NOTE ADULT - PROBLEM SELECTOR PLAN 4
Normocytic anemia - likely 2/2 CKD  - Iron studies consistent with MILLY - will start iron supplementation  - Per nephro, started on darbepoitin weekly  - Bowel regimen - ERCP was planned for outpatient 4/26  - GI aware, will await improvement in respiratory function  - once pulm and cards documents clearance, will reach out to GI  - GI will try for ERCP 5/16 but if not done then will have to be done outpt

## 2023-05-16 NOTE — PROGRESS NOTE ADULT - PROBLEM SELECTOR PLAN 2
- Currently on 2L NC  - goal sat 92-95%  - Not on home O2  - Duonebs q6 for wheezes  - Incentive spirometry  - will have pulm evaluate patient given mod PH as a possible etiology of her hypoxia -> potential RHC 5/9  - will setup home O2 ProBNP 2505, TTE 3/23 with EF 76%, severe pulmonary hypertension, moderate diastolic dysfunction. POCUS done in ED cw pulmonary edema  - CT chest 5/1 with unchanged pulmonary edema/effusions  - Per cardiology transition bumex 1mg IV BID to bumex 1 mg PO BID on 5/8  - c/w aldactone 12.5mg daily   - Strict I&Os (net negative past 24 hours)  - Daily weights (neg 4 kg since admission)  - cardiology stating pt is now euvolemic; rec pulm eval -> f/u RHC results  - pulm rec V/Q scan -> WNL, optimized from pulm perspective

## 2023-05-16 NOTE — PROVIDER CONTACT NOTE (OTHER) - SITUATION
atach x1min 19 sec up to 150s
patient had PAT on tele monitor for 6.1 seconds with HR- 140-150.
Pt had PAT up to 120 for 10 seconds
Patient had a PAF, HR up to 145 x 23 seconds.
PAT 2.6 sec with HR up to 145
Pt. states she has 8/10 chest pressure rating towards left upper arm
converted to aflutter sustaining 140s HR

## 2023-05-17 ENCOUNTER — TRANSCRIPTION ENCOUNTER (OUTPATIENT)
Age: 83
End: 2023-05-17

## 2023-05-17 VITALS
TEMPERATURE: 98 F | HEART RATE: 66 BPM | RESPIRATION RATE: 18 BRPM | DIASTOLIC BLOOD PRESSURE: 84 MMHG | SYSTOLIC BLOOD PRESSURE: 137 MMHG | OXYGEN SATURATION: 100 %

## 2023-05-17 LAB
ALBUMIN SERPL ELPH-MCNC: 3.4 G/DL — SIGNIFICANT CHANGE UP (ref 3.3–5)
ALP SERPL-CCNC: 75 U/L — SIGNIFICANT CHANGE UP (ref 40–120)
ALT FLD-CCNC: 14 U/L — SIGNIFICANT CHANGE UP (ref 10–45)
ANION GAP SERPL CALC-SCNC: 12 MMOL/L — SIGNIFICANT CHANGE UP (ref 5–17)
AST SERPL-CCNC: 26 U/L — SIGNIFICANT CHANGE UP (ref 10–40)
BASOPHILS # BLD AUTO: 0.02 K/UL — SIGNIFICANT CHANGE UP (ref 0–0.2)
BASOPHILS NFR BLD AUTO: 0.2 % — SIGNIFICANT CHANGE UP (ref 0–2)
BILIRUB SERPL-MCNC: 0.2 MG/DL — SIGNIFICANT CHANGE UP (ref 0.2–1.2)
BUN SERPL-MCNC: 42 MG/DL — HIGH (ref 7–23)
CALCIUM SERPL-MCNC: 9.5 MG/DL — SIGNIFICANT CHANGE UP (ref 8.4–10.5)
CHLORIDE SERPL-SCNC: 96 MMOL/L — SIGNIFICANT CHANGE UP (ref 96–108)
CO2 SERPL-SCNC: 31 MMOL/L — SIGNIFICANT CHANGE UP (ref 22–31)
CREAT SERPL-MCNC: 2.09 MG/DL — HIGH (ref 0.5–1.3)
EGFR: 23 ML/MIN/1.73M2 — LOW
EOSINOPHIL # BLD AUTO: 0.05 K/UL — SIGNIFICANT CHANGE UP (ref 0–0.5)
EOSINOPHIL NFR BLD AUTO: 0.6 % — SIGNIFICANT CHANGE UP (ref 0–6)
GLUCOSE BLDC GLUCOMTR-MCNC: 165 MG/DL — HIGH (ref 70–99)
GLUCOSE BLDC GLUCOMTR-MCNC: 267 MG/DL — HIGH (ref 70–99)
GLUCOSE SERPL-MCNC: 186 MG/DL — HIGH (ref 70–99)
HCT VFR BLD CALC: 35.4 % — SIGNIFICANT CHANGE UP (ref 34.5–45)
HGB BLD-MCNC: 10.2 G/DL — LOW (ref 11.5–15.5)
IMM GRANULOCYTES NFR BLD AUTO: 0.3 % — SIGNIFICANT CHANGE UP (ref 0–0.9)
LYMPHOCYTES # BLD AUTO: 1.52 K/UL — SIGNIFICANT CHANGE UP (ref 1–3.3)
LYMPHOCYTES # BLD AUTO: 17.4 % — SIGNIFICANT CHANGE UP (ref 13–44)
MAGNESIUM SERPL-MCNC: 1.8 MG/DL — SIGNIFICANT CHANGE UP (ref 1.6–2.6)
MCHC RBC-ENTMCNC: 27.9 PG — SIGNIFICANT CHANGE UP (ref 27–34)
MCHC RBC-ENTMCNC: 28.8 GM/DL — LOW (ref 32–36)
MCV RBC AUTO: 97 FL — SIGNIFICANT CHANGE UP (ref 80–100)
MONOCYTES # BLD AUTO: 0.64 K/UL — SIGNIFICANT CHANGE UP (ref 0–0.9)
MONOCYTES NFR BLD AUTO: 7.3 % — SIGNIFICANT CHANGE UP (ref 2–14)
NEUTROPHILS # BLD AUTO: 6.49 K/UL — SIGNIFICANT CHANGE UP (ref 1.8–7.4)
NEUTROPHILS NFR BLD AUTO: 74.2 % — SIGNIFICANT CHANGE UP (ref 43–77)
NRBC # BLD: 0 /100 WBCS — SIGNIFICANT CHANGE UP (ref 0–0)
PHOSPHATE SERPL-MCNC: 4.3 MG/DL — SIGNIFICANT CHANGE UP (ref 2.5–4.5)
PLATELET # BLD AUTO: 324 K/UL — SIGNIFICANT CHANGE UP (ref 150–400)
POTASSIUM SERPL-MCNC: 4.7 MMOL/L — SIGNIFICANT CHANGE UP (ref 3.5–5.3)
POTASSIUM SERPL-SCNC: 4.7 MMOL/L — SIGNIFICANT CHANGE UP (ref 3.5–5.3)
PROT SERPL-MCNC: 6.7 G/DL — SIGNIFICANT CHANGE UP (ref 6–8.3)
RBC # BLD: 3.65 M/UL — LOW (ref 3.8–5.2)
RBC # FLD: 19.6 % — HIGH (ref 10.3–14.5)
SODIUM SERPL-SCNC: 139 MMOL/L — SIGNIFICANT CHANGE UP (ref 135–145)
WBC # BLD: 8.75 K/UL — SIGNIFICANT CHANGE UP (ref 3.8–10.5)
WBC # FLD AUTO: 8.75 K/UL — SIGNIFICANT CHANGE UP (ref 3.8–10.5)

## 2023-05-17 PROCEDURE — 93306 TTE W/DOPPLER COMPLETE: CPT

## 2023-05-17 PROCEDURE — 83935 ASSAY OF URINE OSMOLALITY: CPT

## 2023-05-17 PROCEDURE — 71045 X-RAY EXAM CHEST 1 VIEW: CPT

## 2023-05-17 PROCEDURE — 94640 AIRWAY INHALATION TREATMENT: CPT

## 2023-05-17 PROCEDURE — 85027 COMPLETE CBC AUTOMATED: CPT

## 2023-05-17 PROCEDURE — 83550 IRON BINDING TEST: CPT

## 2023-05-17 PROCEDURE — 36415 COLL VENOUS BLD VENIPUNCTURE: CPT

## 2023-05-17 PROCEDURE — A9540: CPT

## 2023-05-17 PROCEDURE — 93451 RIGHT HEART CATH: CPT

## 2023-05-17 PROCEDURE — 86850 RBC ANTIBODY SCREEN: CPT

## 2023-05-17 PROCEDURE — 82607 VITAMIN B-12: CPT

## 2023-05-17 PROCEDURE — 78582 LUNG VENTILAT&PERFUS IMAGING: CPT

## 2023-05-17 PROCEDURE — 84100 ASSAY OF PHOSPHORUS: CPT

## 2023-05-17 PROCEDURE — 93005 ELECTROCARDIOGRAM TRACING: CPT

## 2023-05-17 PROCEDURE — 96374 THER/PROPH/DIAG INJ IV PUSH: CPT

## 2023-05-17 PROCEDURE — 85730 THROMBOPLASTIN TIME PARTIAL: CPT

## 2023-05-17 PROCEDURE — 97161 PT EVAL LOW COMPLEX 20 MIN: CPT

## 2023-05-17 PROCEDURE — 87640 STAPH A DNA AMP PROBE: CPT

## 2023-05-17 PROCEDURE — 97116 GAIT TRAINING THERAPY: CPT

## 2023-05-17 PROCEDURE — 83735 ASSAY OF MAGNESIUM: CPT

## 2023-05-17 PROCEDURE — 87086 URINE CULTURE/COLONY COUNT: CPT

## 2023-05-17 PROCEDURE — 86900 BLOOD TYPING SEROLOGIC ABO: CPT

## 2023-05-17 PROCEDURE — 76376 3D RENDER W/INTRP POSTPROCES: CPT

## 2023-05-17 PROCEDURE — 83880 ASSAY OF NATRIURETIC PEPTIDE: CPT

## 2023-05-17 PROCEDURE — 87641 MR-STAPH DNA AMP PROBE: CPT

## 2023-05-17 PROCEDURE — 97110 THERAPEUTIC EXERCISES: CPT

## 2023-05-17 PROCEDURE — 93308 TTE F-UP OR LMTD: CPT

## 2023-05-17 PROCEDURE — 84300 ASSAY OF URINE SODIUM: CPT

## 2023-05-17 PROCEDURE — 71250 CT THORAX DX C-: CPT

## 2023-05-17 PROCEDURE — 86901 BLOOD TYPING SEROLOGIC RH(D): CPT

## 2023-05-17 PROCEDURE — 82728 ASSAY OF FERRITIN: CPT

## 2023-05-17 PROCEDURE — 83605 ASSAY OF LACTIC ACID: CPT

## 2023-05-17 PROCEDURE — 82962 GLUCOSE BLOOD TEST: CPT

## 2023-05-17 PROCEDURE — 94660 CPAP INITIATION&MGMT: CPT

## 2023-05-17 PROCEDURE — 84156 ASSAY OF PROTEIN URINE: CPT

## 2023-05-17 PROCEDURE — 97530 THERAPEUTIC ACTIVITIES: CPT

## 2023-05-17 PROCEDURE — 80048 BASIC METABOLIC PNL TOTAL CA: CPT

## 2023-05-17 PROCEDURE — 84540 ASSAY OF URINE/UREA-N: CPT

## 2023-05-17 PROCEDURE — A9567: CPT

## 2023-05-17 PROCEDURE — 82803 BLOOD GASES ANY COMBINATION: CPT

## 2023-05-17 PROCEDURE — 85610 PROTHROMBIN TIME: CPT

## 2023-05-17 PROCEDURE — 99239 HOSP IP/OBS DSCHRG MGMT >30: CPT

## 2023-05-17 PROCEDURE — 85379 FIBRIN DEGRADATION QUANT: CPT

## 2023-05-17 PROCEDURE — 82553 CREATINE MB FRACTION: CPT

## 2023-05-17 PROCEDURE — 80053 COMPREHEN METABOLIC PANEL: CPT

## 2023-05-17 PROCEDURE — U0005: CPT

## 2023-05-17 PROCEDURE — 73030 X-RAY EXAM OF SHOULDER: CPT

## 2023-05-17 PROCEDURE — 99285 EMERGENCY DEPT VISIT HI MDM: CPT

## 2023-05-17 PROCEDURE — 83540 ASSAY OF IRON: CPT

## 2023-05-17 PROCEDURE — 85025 COMPLETE CBC W/AUTO DIFF WBC: CPT

## 2023-05-17 PROCEDURE — 81001 URINALYSIS AUTO W/SCOPE: CPT

## 2023-05-17 PROCEDURE — 84133 ASSAY OF URINE POTASSIUM: CPT

## 2023-05-17 PROCEDURE — 82746 ASSAY OF FOLIC ACID SERUM: CPT

## 2023-05-17 PROCEDURE — 84484 ASSAY OF TROPONIN QUANT: CPT

## 2023-05-17 PROCEDURE — U0003: CPT

## 2023-05-17 PROCEDURE — 82550 ASSAY OF CK (CPK): CPT

## 2023-05-17 PROCEDURE — 82570 ASSAY OF URINE CREATININE: CPT

## 2023-05-17 RX ORDER — SPIRONOLACTONE 25 MG/1
0.5 TABLET, FILM COATED ORAL
Qty: 15 | Refills: 0
Start: 2023-05-17 | End: 2023-06-15

## 2023-05-17 RX ORDER — FERROUS SULFATE 325(65) MG
1 TABLET ORAL
Qty: 30 | Refills: 0
Start: 2023-05-17 | End: 2023-06-15

## 2023-05-17 RX ORDER — APIXABAN 2.5 MG/1
1 TABLET, FILM COATED ORAL
Qty: 0 | Refills: 0 | DISCHARGE
Start: 2023-05-17

## 2023-05-17 RX ADMIN — Medication 3: at 11:53

## 2023-05-17 RX ADMIN — APIXABAN 2.5 MILLIGRAM(S): 2.5 TABLET, FILM COATED ORAL at 05:20

## 2023-05-17 RX ADMIN — Medication 3 MILLILITER(S): at 00:58

## 2023-05-17 RX ADMIN — SPIRONOLACTONE 12.5 MILLIGRAM(S): 25 TABLET, FILM COATED ORAL at 05:18

## 2023-05-17 RX ADMIN — Medication 40 MICROGRAM(S): at 14:24

## 2023-05-17 RX ADMIN — Medication 3 MILLILITER(S): at 12:02

## 2023-05-17 RX ADMIN — Medication 7 UNIT(S): at 07:55

## 2023-05-17 RX ADMIN — GABAPENTIN 200 MILLIGRAM(S): 400 CAPSULE ORAL at 11:54

## 2023-05-17 RX ADMIN — Medication 3 MILLILITER(S): at 05:17

## 2023-05-17 RX ADMIN — Medication 100 MILLIGRAM(S): at 05:18

## 2023-05-17 RX ADMIN — Medication 325 MILLIGRAM(S): at 11:54

## 2023-05-17 RX ADMIN — PANTOPRAZOLE SODIUM 40 MILLIGRAM(S): 20 TABLET, DELAYED RELEASE ORAL at 05:18

## 2023-05-17 RX ADMIN — Medication 1: at 07:54

## 2023-05-17 RX ADMIN — Medication 7 UNIT(S): at 11:53

## 2023-05-17 RX ADMIN — Medication 81 MILLIGRAM(S): at 11:54

## 2023-05-17 RX ADMIN — CHLORHEXIDINE GLUCONATE 1 APPLICATION(S): 213 SOLUTION TOPICAL at 05:17

## 2023-05-17 RX ADMIN — Medication 1 TABLET(S): at 11:54

## 2023-05-17 NOTE — PROGRESS NOTE ADULT - PROBLEM SELECTOR PROBLEM 7
HTN (hypertension)

## 2023-05-17 NOTE — PROGRESS NOTE ADULT - REASON FOR ADMISSION

## 2023-05-17 NOTE — PROGRESS NOTE ADULT - PROBLEM SELECTOR PLAN 3
- Currently on 2L NC  - goal sat 92-95%  - Not on home O2  - Duonebs q6 for wheezes  - Incentive spirometry  - will have pulm evaluate patient given mod PH as a possible etiology of her hypoxia -> potential RHC 5/9  - will setup home O2

## 2023-05-17 NOTE — DISCHARGE NOTE NURSING/CASE MANAGEMENT/SOCIAL WORK - NSDCVIVACCINE_GEN_ALL_CORE_FT
COVID-19, mRNA, LNP-S, PF, 30 mcg/0.3 mL dose (Pfizer); 27-Jul-2021 15:42; Darien Garcia); [employer]; [id number] (Exp. Date: 31-Aug-2021); IntraMuscular; Deltoid Left.; 0.3 milliLiter(s);

## 2023-05-17 NOTE — DISCHARGE NOTE NURSING/CASE MANAGEMENT/SOCIAL WORK - PATIENT PORTAL LINK FT
You can access the FollowMyHealth Patient Portal offered by [facility] by registering at the following website: [email or web address]. By joining [employer] [facility]’s FollowMyHealth portal, you will also be able to view your health information using other applications (apps) compatible with our system.

## 2023-05-17 NOTE — PROGRESS NOTE ADULT - PROBLEM/PLAN-5
DISPLAY PLAN FREE TEXT
8

## 2023-05-17 NOTE — PROGRESS NOTE ADULT - PROVIDER SPECIALTY LIST ADULT
Cardiology
Internal Medicine
Nephrology
Cardiology
Internal Medicine
Nephrology
Cardiology
Internal Medicine
Nephrology
Internal Medicine
Pulmonology
Internal Medicine
CCU
Internal Medicine

## 2023-05-17 NOTE — PROGRESS NOTE ADULT - PROBLEM SELECTOR PROBLEM 6
T2DM (type 2 diabetes mellitus)

## 2023-05-17 NOTE — PROGRESS NOTE ADULT - PROBLEM SELECTOR PLAN 2
ProBNP 2505, TTE 3/23 with EF 76%, severe pulmonary hypertension, moderate diastolic dysfunction. POCUS done in ED cw pulmonary edema  - CT chest 5/1 with unchanged pulmonary edema/effusions  - Per cardiology transition bumex 1mg IV BID to bumex 1 mg PO BID on 5/8  - c/w aldactone 12.5mg daily   - Strict I&Os (net negative past 24 hours)  - Daily weights (neg 4 kg since admission)  - cardiology stating pt is now euvolemic; rec pulm eval -> f/u RHC results  - pulm rec V/Q scan -> WNL, optimized from pulm perspective ProBNP 2505, TTE 3/23 with EF 76%, severe pulmonary hypertension, moderate diastolic dysfunction. POCUS done in ED cw pulmonary edema  - CT chest 5/1 with unchanged pulmonary edema/effusions  - Per cardiology transition bumex 1mg IV BID to bumex 1 mg PO BID on 5/8; stopped 5/16 due to TAO, resume on 5/18 at home dose QD  - c/w aldactone 12.5mg daily   - Strict I&Os (net negative past 24 hours)  - Daily weights (neg 4 kg since admission)  - cardiology stating pt is now euvolemic; rec pulm eval -> f/u RHC results  - pulm rec V/Q scan -> WNL, optimized from pulm perspective

## 2023-05-17 NOTE — PROGRESS NOTE ADULT - SUBJECTIVE AND OBJECTIVE BOX
DATE OF SERVICE: 05-17-23 @ 14:53    Patient is a 83y old  Female who presents with a chief complaint of Shortness of breath (17 May 2023 10:35)      INTERVAL HISTORY: Feels ok. Niece at bedside.     REVIEW OF SYSTEMS:  CONSTITUTIONAL: No weakness  EYES/ENT: No visual changes;  No throat pain   NECK: No pain or stiffness  RESPIRATORY: No cough, wheezing; No shortness of breath  CARDIOVASCULAR: No chest pain or palpitations  GASTROINTESTINAL: No abdominal  pain. No nausea, vomiting, or hematemesis  GENITOURINARY: No dysuria, frequency or hematuria  NEUROLOGICAL: No stroke like symptoms  SKIN: No rashes    TELEMETRY Personally reviewed: SR 60-70 PVCs, trigeminy  	  MEDICATIONS:  metoprolol tartrate 100 milliGRAM(s) Oral every 12 hours  spironolactone 12.5 milliGRAM(s) Oral daily        PHYSICAL EXAM:  T(C): 36.8 (05-17-23 @ 11:18), Max: 36.8 (05-16-23 @ 19:55)  HR: 66 (05-17-23 @ 11:18) (62 - 77)  BP: 137/84 (05-17-23 @ 11:18) (105/68 - 138/72)  RR: 18 (05-17-23 @ 11:18) (18 - 18)  SpO2: 100% (05-17-23 @ 11:18) (99% - 100%)  Wt(kg): --  I&O's Summary    16 May 2023 07:01  -  17 May 2023 07:00  --------------------------------------------------------  IN: 240 mL / OUT: 1400 mL / NET: -1160 mL    17 May 2023 07:01  -  17 May 2023 14:53  --------------------------------------------------------  IN: 480 mL / OUT: 0 mL / NET: 480 mL          Appearance: In no distress	  HEENT:    PERRL, EOMI	  Cardiovascular:  S1 S2, No JVD  Respiratory: Lungs clear to auscultation	  Gastrointestinal:  Soft, Non-tender, + BS	  Vascularature:  No edema of LE  Psychiatric: Appropriate affect   Neuro: no acute focal deficits                               10.2   8.75  )-----------( 324      ( 17 May 2023 04:58 )             35.4     05-17    139  |  96  |  42<H>  ----------------------------<  186<H>  4.7   |  31  |  2.09<H>    Ca    9.5      17 May 2023 04:58  Phos  4.3     05-17  Mg     1.8     05-17    TPro  6.7  /  Alb  3.4  /  TBili  0.2  /  DBili  x   /  AST  26  /  ALT  14  /  AlkPhos  75  05-17        Labs personally reviewed      ASSESSMENT/PLAN: 	    Ms. Batista is a 82 YO woman with PMH of HTN, HLD, DM2, HFpEF (EF 65%), CAD s/p CABG, Breast CA s/p R partial mastectomy, rectal CA s/p resection with recent admission for acute rissa s/p ERCP w/ stent c/b gallbladder perf w/ perc rissa and recurrent SVT, presenting for shortness of breath. Denies CP, palpitations, orthopnea or syncope. Follows as OP with Dr. Hagan.    Problem/Plan -1  Problem: Acute on Chronic Diastolic CHF  - Prior TTE 3/23 shows preserved EF, hyperdynamic LV function, basal inferoseptum hypokinesis, decreased RV systolic function severe pulm pressures, B/L pleural effusions  - Repeat TTE 5/6 Normal left ventricular cavity size. The left ventricular wall thickness is normal. The left ventricular systolic function is normal, EF 62%. No RWA. Mod PH. PAS pressure 56 mmHg.  - RHC (5/10) with normal PCWP and CI, moderate-severe pulm HTN  - V/Q no PE (5/12)   - CT chest (5/1) with small pleural effusions and mosaic attenuation of the lungs suggestive of fluid overload, not significantly changed from 2/3/23  - CXR (5/12) clear lungs   - Creatinine uptrending 2.35<--1.87   - Continue spironolactone 12.5mg PO daily     Problem/Plan -2  Problem: CAD   - s/p CABG 2020  - ECG with no ischemia noted  - Trop 21  - c/w ASA, statin, and metoprolol     Problem/Plan -3  Problem: hx of Recurrent SVT  - c/w Metoprolol 100mg PO BID  - cont to monitor on tele    Problem/Plan -4  Problem: Atrial Fibrillation  - Patient with n/o A-flutter on previous admission  - c/w Metoprolol   - Eliquis 2.5mg PO BID on hold   - c/w Heparin gtt    Problem/Plan- 5  Problem: Hx of Cholecystitis, preop Risk Stratification   - Euvolemic, optimized from cardiac standpoint  - elevated risk for mod risk ERCP, no cardiac contraindication to proceed        FABIOLA Smith-SEE [place], [facility]  Cardiovascular Medicine  [address] [place] [address], [address]  Available through call or text on [employer] TEAMs  Office: [phone]

## 2023-05-17 NOTE — PROGRESS NOTE ADULT - ASSESSMENT
Ms. Batista is a 84 YO woman with PMH of HTN, HLD, DM2, HFpEF (EF 65%), CAD s/p CABG, Breast CA s/p R partial mastectomy, rectal CA s/p resection with previous admission for acute rissa s/p ERCP w/ stent c/b gallbladder perf w/ perc rissa and recurrent SVT, and recent admission for CHF exacerbation presenting for shortness of breath.----out patient nephrologist Dr Kan .    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 pt is  euvolemic   hypervolemia - pt is  euvolemic   Electrolytes - controlled  Anemia- -ckd related  also consistent  iron deficiency , tsat 7, ferritin 28---sp iv venofer  5 doses completed on 4/3023   GI- ERCP on hold due to volume status ----- improved euvolemic   met alkalosis --- in the view of diuretic --- cont to monitor   a fib --  SP heparin drip----- on eliquis   CAD;; EF 62  %  , HRC -- -- 5/10/23      RECOMMENDATIONS  -cr improved  -Fe urate  prerenal   -cont holding diuretics   - Cont spironolactone 12.5mg po daily ( anti ryan + diuretic )   - Keep k ~4 and mag 2  - Dose all medications for GFR ~25 ml/min  - Monitor I/Os and daily weights-- euvolemic   -  cont daily oral iron , weekly Aranesp 40 mcg SQ .  -GI ---ERCP out patient .                d-w primary team   Brenda Rader  [facility]  Office: [phone]   Ms. Batista is a 84 YO woman with PMH of HTN, HLD, DM2, HFpEF (EF 65%), CAD s/p CABG, Breast CA s/p R partial mastectomy, rectal CA s/p resection with previous admission for acute rissa s/p ERCP w/ stent c/b gallbladder perf w/ perc rissa and recurrent SVT, and recent admission for CHF exacerbation presenting for shortness of breath.----out patient nephrologist Dr Kan .    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 pt is  euvolemic   hypervolemia - pt is  euvolemic   Electrolytes - controlled  Anemia- -ckd related  also consistent  iron deficiency , tsat 7, ferritin 28---sp iv venofer  5 doses completed on 4/3023   GI- ERCP on hold due to volume status ----- improved euvolemic   met alkalosis --- in the view of diuretic --- cont to monitor   a fib --  SP heparin drip----- on eliquis   CAD;; EF 62  %  , HRC -- -- 5/10/23      RECOMMENDATIONS  -cr improved  -Fe urate  prerenal   -cont holding diuretics   - Cont spironolactone 12.5mg po daily ( anti ryan + diuretic )   - Keep k ~4 and mag 2  - Dose all medications for GFR ~25 ml/min  - Monitor I/Os and daily weights-- euvolemic   -  cont daily oral iron , weekly Aranesp 40 mcg SQ .  -GI ---ERCP out patient .  upon dc follow with dr Kan in a week , cont monitor weight  at home,             d-w primary team   Brenda Rader  [facility]  Office: [phone]

## 2023-05-17 NOTE — PROGRESS NOTE ADULT - SUBJECTIVE AND OBJECTIVE BOX
Sultan Sixto MD  PGY 1 Department of Internal Medicine        Patient is a 83y old  Female who presents with a chief complaint of Shortness of breath (16 May 2023 12:08)      SUBJECTIVE / OVERNIGHT EVENTS: Pt seen and examined. No acute overnight events. Denies fevers, chills, CP, SOB, Abdominal pain, N/V, Constipation, Diarrhea        MEDICATIONS  (STANDING):  albuterol/ipratropium for Nebulization 3 milliLiter(s) Nebulizer every 6 hours  apixaban 2.5 milliGRAM(s) Oral every 12 hours  aspirin  chewable 81 milliGRAM(s) Oral daily  atorvastatin 40 milliGRAM(s) Oral at bedtime  chlorhexidine 2% Cloths 1 Application(s) Topical <User Schedule>  darbepoetin Injectable ViaL 40 MICROGram(s) SubCutaneous every 7 days  dextrose 5%. 1000 milliLiter(s) (100 mL/Hr) IV Continuous <Continuous>  dextrose 5%. 1000 milliLiter(s) (50 mL/Hr) IV Continuous <Continuous>  dextrose 50% Injectable 25 Gram(s) IV Push once  dextrose 50% Injectable 12.5 Gram(s) IV Push once  dextrose 50% Injectable 25 Gram(s) IV Push once  ferrous    sulfate 325 milliGRAM(s) Oral daily  gabapentin 200 milliGRAM(s) Oral daily  glucagon  Injectable 1 milliGRAM(s) IntraMuscular once  insulin glargine Injectable (LANTUS) 18 Unit(s) SubCutaneous at bedtime  insulin lispro (ADMELOG) corrective regimen sliding scale   SubCutaneous three times a day before meals  insulin lispro (ADMELOG) corrective regimen sliding scale   SubCutaneous at bedtime  insulin lispro Injectable (ADMELOG) 7 Unit(s) SubCutaneous three times a day with meals  metoprolol tartrate 100 milliGRAM(s) Oral every 12 hours  Nephro-ashley 1 Tablet(s) Oral daily  pantoprazole    Tablet 40 milliGRAM(s) Oral before breakfast  polyethylene glycol 3350 17 Gram(s) Oral daily  senna 2 Tablet(s) Oral at bedtime  spironolactone 12.5 milliGRAM(s) Oral daily    MEDICATIONS  (PRN):  acetaminophen     Tablet .. 650 milliGRAM(s) Oral every 6 hours PRN Temp greater or equal to 38C (100.4F), Mild Pain (1 - 3)  dextrose Oral Gel 15 Gram(s) Oral once PRN Blood Glucose LESS THAN 70 milliGRAM(s)/deciliter  melatonin 3 milliGRAM(s) Oral at bedtime PRN Insomnia      I&O's Summary    15 May 2023 07:01  -  16 May 2023 07:00  --------------------------------------------------------  IN: 440 mL / OUT: 950 mL / NET: -510 mL    16 May 2023 07:01  -  17 May 2023 05:36  --------------------------------------------------------  IN: 240 mL / OUT: 900 mL / NET: -660 mL        Vital Signs Last 24 Hrs  T(C): 36.7 (17 May 2023 04:17), Max: 36.8 (16 May 2023 11:18)  T(F): 98.1 (17 May 2023 04:17), Max: 98.3 (16 May 2023 11:18)  HR: 70 (17 May 2023 04:17) (62 - 82)  BP: 105/68 (17 May 2023 04:17) (105/68 - 138/72)  BP(mean): --  RR: 18 (17 May 2023 04:17) (18 - 18)  SpO2: 100% (17 May 2023 04:17) (90% - 100%)    Parameters below as of 17 May 2023 04:17  Patient On (Oxygen Delivery Method): nasal cannula  O2 Flow (L/min): 2      CAPILLARY BLOOD GLUCOSE      POCT Blood Glucose.: 227 mg/dL (16 May 2023 21:26)  POCT Blood Glucose.: 165 mg/dL (16 May 2023 17:10)  POCT Blood Glucose.: 167 mg/dL (16 May 2023 12:06)  POCT Blood Glucose.: 161 mg/dL (16 May 2023 06:49)      PHYSICAL EXAM:  CONSTITUTIONAL: NAD  HEENT: NC/AT  RESPIRATORY: mild crackles b/l  CARDIOVASCULAR: Regular rate and rhythm, normal S1 and S2, no murmur/rub/gallop; Minimal lower extremity edema; Peripheral pulses are 2+ bilaterally  ABDOMEN: Nontender to palpation, normoactive bowel sounds, no rebound/guarding; No hepatosplenomegaly. Abdominal bruit heard today, most prominent on left side  MUSCLOSKELETAL: no clubbing or cyanosis of digits; no joint swelling or tenderness to palpation  EXTREMITIES: minimal LE edema, distal pulses intact   NEURO: no focal deficits   PSYCH: A+O to person, place, and time; affect appropriate      LABS:                        10.2   8.75  )-----------( 324      ( 17 May 2023 04:58 )             35.4     Auto Eosinophil # 0.05  / Auto Eosinophil % 0.6   / Auto Neutrophil # 6.49  / Auto Neutrophil % 74.2  / BANDS % x                            9.4    8.63  )-----------( 304      ( 16 May 2023 04:36 )             32.1     Auto Eosinophil # 0.10  / Auto Eosinophil % 1.2   / Auto Neutrophil # 6.04  / Auto Neutrophil % 70.1  / BANDS % x                            10.6   9.88  )-----------( 353      ( 15 May 2023 19:31 )             35.6     Auto Eosinophil # x     / Auto Eosinophil % x     / Auto Neutrophil # x     / Auto Neutrophil % x     / BANDS % x        05    139  |  96  |  42<H>  ----------------------------<  186<H>  4.7   |  31  |  2.09<H>      142  |  112<H>  |  30<H>  ----------------------------<  118<H>  4.5   |  21<L>  |  1.42<H>  05    141  |  97  |  43<H>  ----------------------------<  160<H>  4.4   |  31  |  2.35<H>    Ca    9.5      17 May 2023 04:58  Mg     1.8       Phos  4.3       TPro  6.7  /  Alb  3.4  /  TBili  0.2  /  DBili  x   /  AST  26  /  ALT  14  /  AlkPhos  75  05-17  TPro  6.6  /  Alb  3.4  /  TBili  0.2  /  DBili  x   /  AST  18  /  ALT  15  /  AlkPhos  76  05-16  TPro  7.2  /  Alb  3.4  /  TBili  0.3  /  DBili  x   /  AST  20  /  ALT  14  /  AlkPhos  83  05-15    PT/INR - ( 16 May 2023 04:36 )   PT: 12.2 sec;   INR: 1.05 ratio         PTT - ( 16 May 2023 04:36 )  PTT:72.0 sec      Urinalysis Basic - ( 16 May 2023 10:41 )    Color: Light Yellow / Appearance: Slightly Turbid / S.011 / pH: x  Gluc: x / Ketone: Negative  / Bili: Negative / Urobili: Negative   Blood: x / Protein: Negative / Nitrite: Negative   Leuk Esterase: Large / RBC: 1 /hpf / WBC 7 /HPF   Sq Epi: x / Non Sq Epi: x / Bacteria: Many            RADIOLOGY & ADDITIONAL TESTS:    Imaging Personally Reviewed:    Consultant(s) Notes Reviewed:      Care Discussed with Consultants/Other Providers:   Sultan Sixto MD  PGY 1 Department of Internal Medicine        Patient is a 83y old  Female who presents with a chief complaint of Shortness of breath (16 May 2023 12:08)      SUBJECTIVE / OVERNIGHT EVENTS: Pt seen and examined. No acute overnight events. Denies fevers, chills, CP, SOB, Abdominal pain, N/V, Constipation, Diarrhea. Feels well. Upset about ERCP being cancelled.        MEDICATIONS  (STANDING):  albuterol/ipratropium for Nebulization 3 milliLiter(s) Nebulizer every 6 hours  apixaban 2.5 milliGRAM(s) Oral every 12 hours  aspirin  chewable 81 milliGRAM(s) Oral daily  atorvastatin 40 milliGRAM(s) Oral at bedtime  chlorhexidine 2% Cloths 1 Application(s) Topical <User Schedule>  darbepoetin Injectable ViaL 40 MICROGram(s) SubCutaneous every 7 days  dextrose 5%. 1000 milliLiter(s) (100 mL/Hr) IV Continuous <Continuous>  dextrose 5%. 1000 milliLiter(s) (50 mL/Hr) IV Continuous <Continuous>  dextrose 50% Injectable 25 Gram(s) IV Push once  dextrose 50% Injectable 12.5 Gram(s) IV Push once  dextrose 50% Injectable 25 Gram(s) IV Push once  ferrous    sulfate 325 milliGRAM(s) Oral daily  gabapentin 200 milliGRAM(s) Oral daily  glucagon  Injectable 1 milliGRAM(s) IntraMuscular once  insulin glargine Injectable (LANTUS) 18 Unit(s) SubCutaneous at bedtime  insulin lispro (ADMELOG) corrective regimen sliding scale   SubCutaneous three times a day before meals  insulin lispro (ADMELOG) corrective regimen sliding scale   SubCutaneous at bedtime  insulin lispro Injectable (ADMELOG) 7 Unit(s) SubCutaneous three times a day with meals  metoprolol tartrate 100 milliGRAM(s) Oral every 12 hours  Nephro-ashley 1 Tablet(s) Oral daily  pantoprazole    Tablet 40 milliGRAM(s) Oral before breakfast  polyethylene glycol 3350 17 Gram(s) Oral daily  senna 2 Tablet(s) Oral at bedtime  spironolactone 12.5 milliGRAM(s) Oral daily    MEDICATIONS  (PRN):  acetaminophen     Tablet .. 650 milliGRAM(s) Oral every 6 hours PRN Temp greater or equal to 38C (100.4F), Mild Pain (1 - 3)  dextrose Oral Gel 15 Gram(s) Oral once PRN Blood Glucose LESS THAN 70 milliGRAM(s)/deciliter  melatonin 3 milliGRAM(s) Oral at bedtime PRN Insomnia      I&O's Summary    15 May 2023 07:01  -  16 May 2023 07:00  --------------------------------------------------------  IN: 440 mL / OUT: 950 mL / NET: -510 mL    16 May 2023 07:01  -  17 May 2023 05:36  --------------------------------------------------------  IN: 240 mL / OUT: 900 mL / NET: -660 mL        Vital Signs Last 24 Hrs  T(C): 36.7 (17 May 2023 04:17), Max: 36.8 (16 May 2023 11:18)  T(F): 98.1 (17 May 2023 04:17), Max: 98.3 (16 May 2023 11:18)  HR: 70 (17 May 2023 04:17) (62 - 82)  BP: 105/68 (17 May 2023 04:17) (105/68 - 138/72)  BP(mean): --  RR: 18 (17 May 2023 04:17) (18 - 18)  SpO2: 100% (17 May 2023 04:17) (90% - 100%)    Parameters below as of 17 May 2023 04:17  Patient On (Oxygen Delivery Method): nasal cannula  O2 Flow (L/min): 2      CAPILLARY BLOOD GLUCOSE      POCT Blood Glucose.: 227 mg/dL (16 May 2023 21:26)  POCT Blood Glucose.: 165 mg/dL (16 May 2023 17:10)  POCT Blood Glucose.: 167 mg/dL (16 May 2023 12:06)  POCT Blood Glucose.: 161 mg/dL (16 May 2023 06:49)      PHYSICAL EXAM:  CONSTITUTIONAL: NAD  HEENT: NC/AT  RESPIRATORY: mild crackles b/l  CARDIOVASCULAR: Regular rate and rhythm, normal S1 and S2, no murmur/rub/gallop; Minimal lower extremity edema; Peripheral pulses are 2+ bilaterally  ABDOMEN: Nontender to palpation, normoactive bowel sounds, no rebound/guarding; No hepatosplenomegaly. Abdominal bruit heard today, most prominent on left side  MUSCLOSKELETAL: no clubbing or cyanosis of digits; no joint swelling or tenderness to palpation  EXTREMITIES: minimal LE edema, distal pulses intact   NEURO: no focal deficits   PSYCH: A+O to person, place, and time; affect appropriate      LABS:                        10.2   8.75  )-----------( 324      ( 17 May 2023 04:58 )             35.4     Auto Eosinophil # 0.05  / Auto Eosinophil % 0.6   / Auto Neutrophil # 6.49  / Auto Neutrophil % 74.2  / BANDS % x                            9.4    8.63  )-----------( 304      ( 16 May 2023 04:36 )             32.1     Auto Eosinophil # 0.10  / Auto Eosinophil % 1.2   / Auto Neutrophil # 6.04  / Auto Neutrophil % 70.1  / BANDS % x                            10.6   9.88  )-----------( 353      ( 15 May 2023 19:31 )             35.6     Auto Eosinophil # x     / Auto Eosinophil % x     / Auto Neutrophil # x     / Auto Neutrophil % x     / BANDS % x            139  |  96  |  42<H>  ----------------------------<  186<H>  4.7   |  31  |  2.09<H>  05    142  |  112<H>  |  30<H>  ----------------------------<  118<H>  4.5   |  21<L>  |  1.42<H>  05    141  |  97  |  43<H>  ----------------------------<  160<H>  4.4   |  31  |  2.35<H>    Ca    9.5      17 May 2023 04:58  Mg     1.8     05-17  Phos  4.3     05-17  TPro  6.7  /  Alb  3.4  /  TBili  0.2  /  DBili  x   /  AST  26  /  ALT  14  /  AlkPhos  75  05-17  TPro  6.6  /  Alb  3.4  /  TBili  0.2  /  DBili  x   /  AST  18  /  ALT  15  /  AlkPhos  76  05-16  TPro  7.2  /  Alb  3.4  /  TBili  0.3  /  DBili  x   /  AST  20  /  ALT  14  /  AlkPhos  83  05-15    PT/INR - ( 16 May 2023 04:36 )   PT: 12.2 sec;   INR: 1.05 ratio         PTT - ( 16 May 2023 04:36 )  PTT:72.0 sec      Urinalysis Basic - ( 16 May 2023 10:41 )    Color: Light Yellow / Appearance: Slightly Turbid / S.011 / pH: x  Gluc: x / Ketone: Negative  / Bili: Negative / Urobili: Negative   Blood: x / Protein: Negative / Nitrite: Negative   Leuk Esterase: Large / RBC: 1 /hpf / WBC 7 /HPF   Sq Epi: x / Non Sq Epi: x / Bacteria: Many            RADIOLOGY & ADDITIONAL TESTS:    Imaging Personally Reviewed:    Consultant(s) Notes Reviewed:      Care Discussed with Consultants/Other Providers:

## 2023-05-17 NOTE — PROGRESS NOTE ADULT - PROBLEM SELECTOR PLAN 7
Resolving  - Monitor blood pressure, restart metoprolol for hx afib

## 2023-05-17 NOTE — PROGRESS NOTE ADULT - ATTENDING COMMENTS
83 year old English speaking female with PMH HTN, HLD, DM2, heart failure with preserved EF, CAD S/P CABG, breast CA and rectal CA who presented with shortness of breath due to CHF exacerbation. She was initially diuresed with IV Bumex but has been transitioned to PO per cardiology recs. She was on Bipap but has been weaned to nasal cannula. Despite diuresis, the patient is unable to be weaned off of oxygen. Pulmonary consult was placed and it was recommended that the patient undergo a RHC which took place 5/10 and was significant for PH. A V/Q was also recommended which showed low probability of PE. Due to her PH she will need to be discharged with home oxygen. She is medically ready for discharge today. Rest of plan as above. Total time spent discharge planning 41 minutes.

## 2023-05-17 NOTE — PROGRESS NOTE ADULT - PROBLEM SELECTOR PLAN 1
Baseline appears to be 1.5-1.9  Increase to 2.3 5/16. Does not appear volume overloaded so concern for overdiuresis. Also w/ new abdominal bruit on exam  -nephrology following  -will check urine lytes, PVR not retaining, and orthostatics neg  -light maintenance fluids while NPO  -f/u BMP Baseline appears to be 1.5-1.9  Increase to 2.3 5/16. Does not appear volume overloaded so concern for overdiuresis. Also w/ new abdominal bruit on exam  Improving  -nephrology following  -will check urine lytes, PVR not retaining, and orthostatics neg  -light maintenance fluids while NPO  -nephrology states hold bumex today; can resume after dc on 5/18; cardiology also ok with plain

## 2023-05-17 NOTE — PROGRESS NOTE ADULT - TIME BILLING
chart and data review, clinical assessment, coordination of care
Review of tests, imaging, labs, documents, medical management, coordination of care and counseling.

## 2023-05-17 NOTE — DISCHARGE NOTE NURSING/CASE MANAGEMENT/SOCIAL WORK - NSDCFUADDAPPT_GEN_ALL_CORE_FT
Please follow-up with your PCP (appt scheduled for 5/23) and cardiology within two weeks.  Please follow-up with your nephrology within one week.  Please follow-up with pulmonology within two weeks.  Please follow-up with gastroenterology within two weeks. They should be in contact with your to schedule your ERCP.

## 2023-05-17 NOTE — PROGRESS NOTE ADULT - PROBLEM SELECTOR PLAN 4
- ERCP was planned for outpatient 4/26  - GI aware, will await improvement in respiratory function  - once pulm and cards documents clearance, will reach out to GI  - GI will try for ERCP 5/16 but if not done then will have to be done outpt - ERCP was planned for outpatient 4/26  - GI aware, will await improvement in respiratory function  - once pulm and cards documents clearance, will reach out to GI  - GI notes will have to be done outpatient

## 2023-05-17 NOTE — PROGRESS NOTE ADULT - PROBLEM SELECTOR PLAN 8
DVT: Per daughter, history of VTE, and history of afib, will hold Eliquis for possible procedure and start heparin drip; resume Eliquis before dc  Dispo: PT rec outpatient PT  Diet: CC/DASH diet

## 2023-05-18 ENCOUNTER — TRANSCRIPTION ENCOUNTER (OUTPATIENT)
Age: 83
End: 2023-05-18

## 2023-05-31 RX ORDER — BLOOD SUGAR DIAGNOSTIC
STRIP MISCELLANEOUS
Qty: 100 | Refills: 2 | Status: ACTIVE | COMMUNITY
Start: 2021-04-23 | End: 1900-01-01

## 2023-06-01 ENCOUNTER — APPOINTMENT (OUTPATIENT)
Dept: GASTROENTEROLOGY | Facility: CLINIC | Age: 83
End: 2023-06-01
Payer: MEDICARE

## 2023-06-01 DIAGNOSIS — K80.50 CALCULUS OF BILE DUCT W/OUT CHOLANGITIS OR CHOLECYSTITIS W/OUT OBSTRUCTION: ICD-10-CM

## 2023-06-01 DIAGNOSIS — I27.20 PULMONARY HYPERTENSION, UNSPECIFIED: ICD-10-CM

## 2023-06-01 PROCEDURE — 99215 OFFICE O/P EST HI 40 MIN: CPT | Mod: 95

## 2023-06-08 ENCOUNTER — APPOINTMENT (OUTPATIENT)
Dept: INTERNAL MEDICINE | Facility: CLINIC | Age: 83
End: 2023-06-08
Payer: MEDICARE

## 2023-06-08 ENCOUNTER — APPOINTMENT (OUTPATIENT)
Dept: CARDIOLOGY | Facility: CLINIC | Age: 83
End: 2023-06-08
Payer: MEDICARE

## 2023-06-08 VITALS
DIASTOLIC BLOOD PRESSURE: 78 MMHG | RESPIRATION RATE: 15 BRPM | HEIGHT: 61 IN | SYSTOLIC BLOOD PRESSURE: 135 MMHG | HEART RATE: 70 BPM | WEIGHT: 178 LBS | OXYGEN SATURATION: 95 % | BODY MASS INDEX: 33.61 KG/M2

## 2023-06-08 DIAGNOSIS — K21.9 GASTRO-ESOPHAGEAL REFLUX DISEASE W/OUT ESOPHAGITIS: ICD-10-CM

## 2023-06-08 DIAGNOSIS — G62.9 POLYNEUROPATHY, UNSPECIFIED: ICD-10-CM

## 2023-06-08 DIAGNOSIS — I50.9 HEART FAILURE, UNSPECIFIED: ICD-10-CM

## 2023-06-08 PROCEDURE — 99214 OFFICE O/P EST MOD 30 MIN: CPT

## 2023-06-08 PROCEDURE — 93000 ELECTROCARDIOGRAM COMPLETE: CPT

## 2023-06-08 PROCEDURE — 99214 OFFICE O/P EST MOD 30 MIN: CPT | Mod: 25

## 2023-06-08 NOTE — PHYSICAL EXAM
[No Acute Distress] : no acute distress [Normal Sclera/Conjunctiva] : normal sclera/conjunctiva [PERRL] : pupils equal round and reactive to light [EOMI] : extraocular movements intact [Normal Outer Ear/Nose] : the outer ears and nose were normal in appearance [Normal Oropharynx] : the oropharynx was normal [No JVD] : no jugular venous distention [No Lymphadenopathy] : no lymphadenopathy [Supple] : supple [Thyroid Normal, No Nodules] : the thyroid was normal and there were no nodules present [No Respiratory Distress] : no respiratory distress  [No Accessory Muscle Use] : no accessory muscle use [Normal Rate] : normal rate  [Regular Rhythm] : with a regular rhythm [Normal S1, S2] : normal S1 and S2 [No Murmur] : no murmur heard [Pedal Pulses Present] : the pedal pulses are present [No Palpable Aorta] : no palpable aorta [No Extremity Clubbing/Cyanosis] : no extremity clubbing/cyanosis [Soft] : abdomen soft [Non Tender] : non-tender [Non-distended] : non-distended [Normal Bowel Sounds] : normal bowel sounds [No CVA Tenderness] : no CVA  tenderness [No Spinal Tenderness] : no spinal tenderness [No Joint Swelling] : no joint swelling [Grossly Normal Strength/Tone] : grossly normal strength/tone [No Rash] : no rash [Coordination Grossly Intact] : coordination grossly intact [No Focal Deficits] : no focal deficits [Normal Affect] : the affect was normal [Normal Insight/Judgement] : insight and judgment were intact [Clear to Auscultation] : lungs were clear to auscultation bilaterally [No Edema] : there was no peripheral edema [de-identified] :  + varicose/spider  vein, Left arm lymphedema , Dependent edema  [de-identified] : + LE hyperpigmentation, dry/flaky  [de-identified] : ambulates with walker

## 2023-06-08 NOTE — PLAN
[FreeTextEntry1] : Follow-up\par \par HTN/HLD/HF/DVT history \par Follows with Dr. Hagan- Has appt today \par cont Amlodipine 5 mg daily\par cont Metoprolol 150 mg daily\par cont Bumetanide 1 mg daily\par cont Spironolactone 25 mg daily\par cont Atorvastatin 40 mg daily\par Cont ASA 81 mg daily\par cont Eliquis 2.5 mg Twice daily\par \par  Diabetes-\par will monitor A1C\par Low carb, low sugar diet/ increased physical activity\par NovoLog 7 units TID with meals \par Basagalar 21 units qhs  \par \par CKD \par Follows with Dr. Kan ( Nephrology) every 6 months \par \par \par

## 2023-06-08 NOTE — HISTORY OF PRESENT ILLNESS
[de-identified] : Patient is an 83year old female accompanied by her daughter with a history of HTN, hyperlipidemia, type 2 diabetes mellitus, breast cancer status post right partial mastectomy, rectal carcinoma status post resection, anemia, hypokalemia, diastolic heart failure, right lower extremity DVT, CAD s/p 2 vessel CABG 11/9/2020 and renal insufficiency who presents today for follow up\par \par Patient reports that she was hospitalized from 4/24- 5/17/2023 for  increased shortness of breath and was diagnosis with CHF exacerbation. She reports feel;ing better , lower extremities edema improved , denies CP, SOB, + BHAT \par DM controlled on  on Basaglar 21 U QHS and Humalog 7 units TID \par She still  need ERCP to remove stent . She denies N, V, abd pain

## 2023-06-11 PROBLEM — I27.20 PULMONARY HYPERTENSION: Status: ACTIVE | Noted: 2023-06-11

## 2023-06-11 PROBLEM — K80.50 CHOLEDOCHOLITHIASIS: Status: ACTIVE | Noted: 2023-02-22

## 2023-06-11 NOTE — HISTORY OF PRESENT ILLNESS
[FreeTextEntry1] : Verbal consent obtained for telemedicine encounter.  Telemedicine encounter was performed using dedicated computer program with audio and video communication.  Face-to-face time was 20 minutes and total encounter time was 35 min.  Over 50% of encounter time was spent in face-to-face discussion regarding assessment problems and coordination of care.\par

## 2023-06-11 NOTE — PLAN
[FreeTextEntry1] : Impression:\par \par Choledocholithiasis\par Congestive heart failure, with exacerbation twice this year.\par Severe pulmonary hypertension\par \par Recommendation:\par \par #1.  Presurgical testing appointment requested due to higher risk of cardiopulmonary problems with general anesthesia\par \par #2.  ERCP possible lithotripsy within 4 weeks.\par Risks, benefits, alternatives of the procedure were discussed with the patient and daughter and they were educated about the procedure. Risks include, but are not limited to, pancreatitis, bleeding, infection, injury to internal organs, possible need for further procedures including emergency surgery, missed lesions, risk of anesthesia, and risk of IV site problems.  \par \par A possible need for more than 1 ERCP was discussed as well, due to large stone.  They understand and agree to proceed.  \par \par \par

## 2023-06-21 LAB
ALBUMIN SERPL ELPH-MCNC: 4.1 G/DL
ALP BLD-CCNC: 99 U/L
ALT SERPL-CCNC: 12 U/L
ANION GAP SERPL CALC-SCNC: 16 MMOL/L
AST SERPL-CCNC: 26 U/L
BILIRUB SERPL-MCNC: 0.3 MG/DL
BUN SERPL-MCNC: 33 MG/DL
CALCIUM SERPL-MCNC: 9.6 MG/DL
CHLORIDE SERPL-SCNC: 104 MMOL/L
CO2 SERPL-SCNC: 23 MMOL/L
CREAT SERPL-MCNC: 1.72 MG/DL
EGFR: 29 ML/MIN/1.73M2
ESTIMATED AVERAGE GLUCOSE: 151 MG/DL
GLUCOSE SERPL-MCNC: 129 MG/DL
HBA1C MFR BLD HPLC: 6.9 %
POTASSIUM SERPL-SCNC: 4.5 MMOL/L
PROT SERPL-MCNC: 8 G/DL
SODIUM SERPL-SCNC: 143 MMOL/L
TSH SERPL-ACNC: 1.54 UIU/ML

## 2023-07-12 ENCOUNTER — OUTPATIENT (OUTPATIENT)
Dept: OUTPATIENT SERVICES | Facility: HOSPITAL | Age: 83
LOS: 1 days | End: 2023-07-12
Payer: MEDICARE

## 2023-07-12 VITALS
RESPIRATION RATE: 15 BRPM | OXYGEN SATURATION: 95 % | DIASTOLIC BLOOD PRESSURE: 77 MMHG | HEART RATE: 66 BPM | TEMPERATURE: 98 F | SYSTOLIC BLOOD PRESSURE: 138 MMHG | WEIGHT: 164.91 LBS | HEIGHT: 60 IN

## 2023-07-12 DIAGNOSIS — Z98.89 OTHER SPECIFIED POSTPROCEDURAL STATES: Chronic | ICD-10-CM

## 2023-07-12 DIAGNOSIS — M51.26 OTHER INTERVERTEBRAL DISC DISPLACEMENT, LUMBAR REGION: Chronic | ICD-10-CM

## 2023-07-12 DIAGNOSIS — Z01.818 ENCOUNTER FOR OTHER PREPROCEDURAL EXAMINATION: ICD-10-CM

## 2023-07-12 DIAGNOSIS — Z90.11 ACQUIRED ABSENCE OF RIGHT BREAST AND NIPPLE: Chronic | ICD-10-CM

## 2023-07-12 DIAGNOSIS — Z90.49 ACQUIRED ABSENCE OF OTHER SPECIFIED PARTS OF DIGESTIVE TRACT: Chronic | ICD-10-CM

## 2023-07-12 DIAGNOSIS — Z96.641 PRESENCE OF RIGHT ARTIFICIAL HIP JOINT: Chronic | ICD-10-CM

## 2023-07-12 DIAGNOSIS — Z87.19 PERSONAL HISTORY OF OTHER DISEASES OF THE DIGESTIVE SYSTEM: ICD-10-CM

## 2023-07-12 DIAGNOSIS — Z96.651 PRESENCE OF RIGHT ARTIFICIAL KNEE JOINT: Chronic | ICD-10-CM

## 2023-07-12 DIAGNOSIS — K80.50 CALCULUS OF BILE DUCT WITHOUT CHOLANGITIS OR CHOLECYSTITIS WITHOUT OBSTRUCTION: ICD-10-CM

## 2023-07-12 DIAGNOSIS — Z93.2 ILEOSTOMY STATUS: Chronic | ICD-10-CM

## 2023-07-12 LAB
A1C WITH ESTIMATED AVERAGE GLUCOSE RESULT: 7.1 % — HIGH (ref 4–5.6)
ANION GAP SERPL CALC-SCNC: 13 MMOL/L — SIGNIFICANT CHANGE UP (ref 5–17)
BUN SERPL-MCNC: 34 MG/DL — HIGH (ref 7–23)
CALCIUM SERPL-MCNC: 9.7 MG/DL — SIGNIFICANT CHANGE UP (ref 8.4–10.5)
CHLORIDE SERPL-SCNC: 101 MMOL/L — SIGNIFICANT CHANGE UP (ref 96–108)
CO2 SERPL-SCNC: 24 MMOL/L — SIGNIFICANT CHANGE UP (ref 22–31)
CREAT SERPL-MCNC: 1.99 MG/DL — HIGH (ref 0.5–1.3)
EGFR: 24 ML/MIN/1.73M2 — LOW
ESTIMATED AVERAGE GLUCOSE: 157 MG/DL — HIGH (ref 68–114)
GLUCOSE SERPL-MCNC: 242 MG/DL — HIGH (ref 70–99)
HCT VFR BLD CALC: 40.8 % — SIGNIFICANT CHANGE UP (ref 34.5–45)
HGB BLD-MCNC: 12.1 G/DL — SIGNIFICANT CHANGE UP (ref 11.5–15.5)
MCHC RBC-ENTMCNC: 27.6 PG — SIGNIFICANT CHANGE UP (ref 27–34)
MCHC RBC-ENTMCNC: 29.7 GM/DL — LOW (ref 32–36)
MCV RBC AUTO: 92.9 FL — SIGNIFICANT CHANGE UP (ref 80–100)
NRBC # BLD: 0 /100 WBCS — SIGNIFICANT CHANGE UP (ref 0–0)
PLATELET # BLD AUTO: 339 K/UL — SIGNIFICANT CHANGE UP (ref 150–400)
POTASSIUM SERPL-MCNC: 4.5 MMOL/L — SIGNIFICANT CHANGE UP (ref 3.5–5.3)
POTASSIUM SERPL-SCNC: 4.5 MMOL/L — SIGNIFICANT CHANGE UP (ref 3.5–5.3)
RBC # BLD: 4.39 M/UL — SIGNIFICANT CHANGE UP (ref 3.8–5.2)
RBC # FLD: 17 % — HIGH (ref 10.3–14.5)
SODIUM SERPL-SCNC: 138 MMOL/L — SIGNIFICANT CHANGE UP (ref 135–145)
WBC # BLD: 9.03 K/UL — SIGNIFICANT CHANGE UP (ref 3.8–10.5)
WBC # FLD AUTO: 9.03 K/UL — SIGNIFICANT CHANGE UP (ref 3.8–10.5)

## 2023-07-12 PROCEDURE — 80048 BASIC METABOLIC PNL TOTAL CA: CPT

## 2023-07-12 PROCEDURE — 83036 HEMOGLOBIN GLYCOSYLATED A1C: CPT

## 2023-07-12 PROCEDURE — G0463: CPT

## 2023-07-12 PROCEDURE — 85027 COMPLETE CBC AUTOMATED: CPT

## 2023-07-12 NOTE — H&P PST ADULT - TOBACCO USE
The types of intraocular lenses were reviewed with the patient along with a discussion of their various strengths and weaknesses. Never smoker

## 2023-07-12 NOTE — H&P PST ADULT - PROBLEM SELECTOR PLAN 1
Procedure: ERCP with anesthesia 7/19/23.  PST labs pending  AC: aspirin 81mg - may continue     - Eliquis- May stop 3 days prior-will see cardiologist to confir recommendation  Medication changes: Basaglar reduced to 16U night prior procedure, insulin DOS  Cardiac evaluation and AC recs- pending

## 2023-07-12 NOTE — H&P PST ADULT - NSICDXPASTMEDICALHX_GEN_ALL_CORE_FT
PAST MEDICAL HISTORY:  Breast CA, right 1989 s/p mastectomy ,IV Chemotherapy    CHF (congestive heart failure)     DVT of leg (deep venous thrombosis) right calf DVT  2/2019 pt on Eliquis    Gallstone     HTN (hypertension)     Hyperlipidemia     Lymphedema of arm right arm s/p mastectomy    Obese     Rectal cancer s/p chemotherapy and  radiation 12 weeks 2/2015 -3/2015    Renal insufficiency     Type II diabetes mellitus      PAST MEDICAL HISTORY:  Breast CA, right 1989 s/p mastectomy ,IV Chemotherapy    CHF (congestive heart failure)     DVT of leg (deep venous thrombosis) right calf DVT  2/2019 pt on Eliquis    Gallstone     HTN (hypertension)     Hyperlipidemia     Lymphedema of arm right arm s/p mastectomy    Obese     Rectal cancer s/p chemotherapy and  radiation 12 weeks 2/2015 -3/2015    Renal insufficiency     Severe pulmonary hypertension     Type II diabetes mellitus

## 2023-07-12 NOTE — H&P PST ADULT - HISTORY OF PRESENT ILLNESS
84 y/o F with PMHx of HTN, HLD, type 2 DM, breast cancer status post right partial mastectomy, rectal carcinoma status post resection, anemia, hypokalemia, diastolic heart failure, right lower extremity DVT (5 years ago), CAD s/p 2 vessel CABG 11/9/2020 and renal insufficiency.   Admitted 1/2022 with acute cholecystitis had ERCP with stent placement, complicated by atypical Afib  Admitted in March 2023 and again in April with CHF exacerbation- Sent home with home O2 5/17/23- Not using it anymore. Was scheduled for ERCP on 4/26 as outpatient so GI was consulted during hospitalization. Initially with plan for optimization of respiratory status prior to ERCP and since patient was still requiring oxygen, RHC was done after consultation of cardiology and pulmonology. Patient cleared for ERCP from cardiology and pulmonology standpoint but GI opting to proceed with stent exchange outpatient. Now scheduled for ERCP with anesthesia 7/19/23.  Patient denies SOB, CP, palpitation, blood in the stool or urine, N/V/D/C, HA, dizziness, seizures. Denies clotting or bleeding disorders. No use of home O2 at this time-   95% O2 RA at PST         84 y/o F with PMHx of severe Pulmonary HTN, HTN, HLD, type 2 DM, breast cancer status post right partial mastectomy, rectal carcinoma status post resection, anemia, hypokalemia, diastolic heart failure, right lower extremity DVT (5 years ago), CAD s/p 2 vessel CABG 11/9/2020 and renal insufficiency.   Admitted 1/2022 with acute cholecystitis had ERCP with stent placement, complicated by atypical Afib  Admitted in March 2023 and again in April with CHF exacerbation- Sent home with home O2 5/17/23- Not using it anymore. Was scheduled for ERCP on 4/26 as outpatient so GI was consulted during hospitalization. Initially with plan for optimization of respiratory status prior to ERCP and since patient was still requiring oxygen, RHC was done after consultation of cardiology and pulmonology. Patient cleared for ERCP from cardiology and pulmonology standpoint but GI opting to proceed with stent exchange outpatient. Now scheduled for ERCP with anesthesia 7/19/23.  Patient denies SOB, CP, palpitation, blood in the stool or urine, N/V/D/C, HA, dizziness, seizures. Denies clotting or bleeding disorders. No use of home O2 at this time-   95% O2 RA at PST

## 2023-07-12 NOTE — H&P PST ADULT - NS HP PST LATEX ALLERGY
Include Z78.9 (Other Specified Conditions Influencing Health Status) As An Associated Diagnosis?: No
Duration Of Freeze Thaw-Cycle (Seconds): 15
Number Of Freeze-Thaw Cycles: 1 freeze-thaw cycle
Consent: Oral informed patient consent was obtained. Discussed treatment options and patient had all questions answered to satisfaction prior to treatment. Risks including, but not limited to: pain, infection, bleeding, scar, change of skin texture and/or color, nerve damage, blisters, allergy, and recurrence of lesion.
Consent: The patient's consent was obtained including but not limited to risks of crusting, scabbing, blistering, scarring, darker or lighter pigmentary change, recurrence, incomplete removal and infection.
Post-Care Instructions: Post op instructions reviewed and written copy offered.
Detail Level: Detailed
Render Post-Care Instructions In Note?: yes
Duration Of Freeze Thaw-Cycle (Seconds): 10-15
Medical Necessity Clause: This procedure was medically necessary because the lesions that were treated were:
Medical Necessity Information: It is in your best interest to select a reason for this procedure from the list below. All of these items fulfill various CMS LCD requirements except the new and changing color options.
Post-Care Instructions: I reviewed with the patient in detail post-care instructions. Patient is to wear sunprotection, and avoid picking at any of the treated lesions. Pt may apply Vaseline to crusted or scabbing areas.
No

## 2023-07-14 ENCOUNTER — NON-APPOINTMENT (OUTPATIENT)
Age: 83
End: 2023-07-14

## 2023-07-18 NOTE — HISTORY OF PRESENT ILLNESS
[FreeTextEntry1] : Patient is an 83 year old woman with a history of HTN, hyperlipidemia, type 2 diabetes mellitus, breast cancer status post right partial mastectomy, rectal carcinoma status post resection, anemia, hypokalemia, diastolic heart failure, right lower extremity DVT, CAD s/p 2 vessel CABG 11/9/2020 and renal insufficiency who presents today for follow up of HTN and heart failure. She states that she experiences dyspnea with exertion. She was admitted in January with acute cholecystitis at which time she had an ERCP with stent placement. Her hospitalization was complicated by atypical atrial fibrillation. She was subsequently admitted in March and again in April with CHF exacerbation. She is currently taking Bumex 1 mg once a day and Aldactone 12.5 mg daily. She has not noticed any significant lower extremity edema. She otherwise denies any chest pain, palpitations, headaches, dyspnea at rest, or dizziness. Her right upper extremity swelling due to chronic lymphedema is unchanged.

## 2023-07-18 NOTE — PHYSICAL EXAM
[General Appearance - Well Developed] : well developed [Normal Appearance] : normal appearance [Well Groomed] : well groomed [General Appearance - Well Nourished] : well nourished [No Deformities] : no deformities [General Appearance - In No Acute Distress] : no acute distress [Normal Conjunctiva] : the conjunctiva exhibited no abnormalities [Respiration, Rhythm And Depth] : normal respiratory rhythm and effort [Exaggerated Use Of Accessory Muscles For Inspiration] : no accessory muscle use [Auscultation Breath Sounds / Voice Sounds] : lungs were clear to auscultation bilaterally [Normal Rate] : normal [Rhythm Regular] : regular [Normal S1] : normal S1 [Normal S2] : normal S2 [II] : a grade 2 [___ +] : bilateral [unfilled]U+ pretibial pitting edema [Bowel Sounds] : normal bowel sounds [Abdomen Soft] : soft [Abdomen Tenderness] : non-tender [Nail Clubbing] : no clubbing of the fingernails [Cyanosis, Localized] : no localized cyanosis [Petechial Hemorrhages (___cm)] : no petechial hemorrhages [Skin Color & Pigmentation] : normal skin color and pigmentation [] : no rash [No Skin Ulcers] : no skin ulcer [Oriented To Time, Place, And Person] : oriented to person, place, and time [Affect] : the affect was normal [Mood] : the mood was normal [No Anxiety] : not feeling anxious [Normal Oral Mucosa] : normal oral mucosa [No Oral Pallor] : no oral pallor [Right Carotid Bruit] : no bruit heard over the right carotid [Left Carotid Bruit] : no bruit heard over the left carotid [Bruit] : no bruit heard [FreeTextEntry1] : Her gait is slow secondary to joint pain.

## 2023-07-18 NOTE — ADDENDUM
[FreeTextEntry1] : ADDENDUM created on 7/17/2023: \par Informed that patient will be going for an ERCP on 7/19/2023. Her Eliquis is being managed by Vascular Cardiology. Based on her visit from 6/8/2023, there were no signs of decompensated heart failure based on her exam and her symptoms. She had moderate pulmonary HTN on her most recent echocardiogram with grossly preserved left ventricular ejection fraction. I have not seen her in over a month, but based on her visit from 6/8/2023, she will be able to proceed with an ERCP without any further workup. She will need to be monitored for any signs of heart failure.  She will continue on ASA 81 mg daily without any interruption.

## 2023-07-18 NOTE — CARDIOLOGY SUMMARY
[___] : [unfilled] [de-identified] : 6/8/2023: Sinus Rhythm at 67 bpm with occasional APCs, low voltage QRS, and an old anteroseptal infarct\par  [de-identified] : 5/6/2023: The left ventricular systolic function is normal with an ejection fraction of 62%. There are no regional wall motion abnormalities seen. Mild LVH. Normal right ventricular cavity size and normal systolic function. Moderate pulmonary hypertension. PASP= 56 mmHg. The right atrium is moderately dilated. Mild mitral regurgitation. Mild-moderate tricuspid regurgitation. \par \par \par \par 9/9/2022: EF (Maldonado Rule): 62 %. Mild mitral regurgitation. Mildly dilated left atrium. Left ventricle suboptimally visualized despite\par intravenous ultrasonic enhancing agent; the apex is foreshortened on apical images. Overall normal left ventricular systolic function. Grossly normal right ventricular size and systolic function. Mild tricuspid regurgitation. PASP= 35 mmHg, consistent with borderline pulmonary hypertension. [de-identified] : C

## 2023-07-18 NOTE — DISCUSSION/SUMMARY
[FreeTextEntry1] : IMPRESSION: Ms. Batista is an 83 year old woman with a history of HTN, hyperlipidemia, type 2 diabetes mellitus, breast cancer status post right partial mastectomy, rectal carcinoma status post resection, diastolic heart failure, anemia, hypokalemia, CAD s/p 2-vessel CABG 11/9/2020, and renal insufficiency who presents today for follow up of HTN and heart failure.\par \par PLAN:\par 1. She is s/p CABG 11/2020 and has not experienced any chest pain since her surgery.  She will continue on Aspirin 81 mg daily. Her ECG that was performed today revealed sinus rhythm without APCs and an old anteroseptal infarct (which was unchanged from prior). There was no ectopy on exam. Her daughter states that her weight have been relatively unchanged at home. She will continue on Bumex 1 mg daily and Aldactone 12.5 mg daily. We will need to follow her potassium and creatinine closely on her diuretic regimen.  She will also continue on a low sodium diet and restrict her fluid intake. Her daughter will continue to follow her weights at home. She can take an additional 1 mg of Bumex if her weight goes up by more than 2 pounds.  There were no signs of decompensated heart failure on exam. \par 2. She will continue on Eliquis 2.5 mg twice daily for her DVT and she will continue to follow up with Vascular Cardiology. \par 3. Her blood pressure is fine today. She will continue on Metoprolol  mg twice daily, in addition to her diuretic regimen.\par 4. Her most recent LDL was very good with mildly elevated triglycerides, thus she will continue on Lipitor 40 mg daily along with a low cholesterol diet. \par 5. She will follow up with me in 2 months or sooner if she is symptomatic. Her daughter will notify me should her weight go up. [EKG obtained to assist in diagnosis and management of assessed problem(s)] : EKG obtained to assist in diagnosis and management of assessed problem(s) No complaints

## 2023-07-19 ENCOUNTER — INPATIENT (INPATIENT)
Facility: HOSPITAL | Age: 83
LOS: 0 days | Discharge: HOME | DRG: 445 | End: 2023-07-20
Attending: STUDENT IN AN ORGANIZED HEALTH CARE EDUCATION/TRAINING PROGRAM | Admitting: INTERNAL MEDICINE
Payer: COMMERCIAL

## 2023-07-19 ENCOUNTER — APPOINTMENT (OUTPATIENT)
Dept: GASTROENTEROLOGY | Facility: HOSPITAL | Age: 83
End: 2023-07-19

## 2023-07-19 VITALS
HEART RATE: 67 BPM | WEIGHT: 175.93 LBS | TEMPERATURE: 98 F | HEIGHT: 60 IN | SYSTOLIC BLOOD PRESSURE: 146 MMHG | DIASTOLIC BLOOD PRESSURE: 64 MMHG | RESPIRATION RATE: 19 BRPM | OXYGEN SATURATION: 95 %

## 2023-07-19 DIAGNOSIS — Z93.2 ILEOSTOMY STATUS: Chronic | ICD-10-CM

## 2023-07-19 DIAGNOSIS — K80.50 CALCULUS OF BILE DUCT WITHOUT CHOLANGITIS OR CHOLECYSTITIS WITHOUT OBSTRUCTION: ICD-10-CM

## 2023-07-19 DIAGNOSIS — Z90.49 ACQUIRED ABSENCE OF OTHER SPECIFIED PARTS OF DIGESTIVE TRACT: Chronic | ICD-10-CM

## 2023-07-19 DIAGNOSIS — K85.90 ACUTE PANCREATITIS WITHOUT NECROSIS OR INFECTION, UNSPECIFIED: ICD-10-CM

## 2023-07-19 DIAGNOSIS — N18.9 CHRONIC KIDNEY DISEASE, UNSPECIFIED: ICD-10-CM

## 2023-07-19 DIAGNOSIS — Z98.89 OTHER SPECIFIED POSTPROCEDURAL STATES: Chronic | ICD-10-CM

## 2023-07-19 DIAGNOSIS — Z96.641 PRESENCE OF RIGHT ARTIFICIAL HIP JOINT: Chronic | ICD-10-CM

## 2023-07-19 DIAGNOSIS — Z96.651 PRESENCE OF RIGHT ARTIFICIAL KNEE JOINT: Chronic | ICD-10-CM

## 2023-07-19 DIAGNOSIS — Z86.79 PERSONAL HISTORY OF OTHER DISEASES OF THE CIRCULATORY SYSTEM: ICD-10-CM

## 2023-07-19 DIAGNOSIS — K80.20 CALCULUS OF GALLBLADDER WITHOUT CHOLECYSTITIS WITHOUT OBSTRUCTION: ICD-10-CM

## 2023-07-19 DIAGNOSIS — R10.9 UNSPECIFIED ABDOMINAL PAIN: ICD-10-CM

## 2023-07-19 DIAGNOSIS — M51.26 OTHER INTERVERTEBRAL DISC DISPLACEMENT, LUMBAR REGION: Chronic | ICD-10-CM

## 2023-07-19 DIAGNOSIS — Z90.11 ACQUIRED ABSENCE OF RIGHT BREAST AND NIPPLE: Chronic | ICD-10-CM

## 2023-07-19 DIAGNOSIS — E11.9 TYPE 2 DIABETES MELLITUS WITHOUT COMPLICATIONS: ICD-10-CM

## 2023-07-19 DIAGNOSIS — I25.10 ATHEROSCLEROTIC HEART DISEASE OF NATIVE CORONARY ARTERY WITHOUT ANGINA PECTORIS: ICD-10-CM

## 2023-07-19 DIAGNOSIS — Z29.9 ENCOUNTER FOR PROPHYLACTIC MEASURES, UNSPECIFIED: ICD-10-CM

## 2023-07-19 DIAGNOSIS — I50.32 CHRONIC DIASTOLIC (CONGESTIVE) HEART FAILURE: ICD-10-CM

## 2023-07-19 LAB
ALBUMIN SERPL ELPH-MCNC: 3.8 G/DL — SIGNIFICANT CHANGE UP (ref 3.3–5)
ALP SERPL-CCNC: 118 U/L — SIGNIFICANT CHANGE UP (ref 40–120)
ALT FLD-CCNC: 18 U/L — SIGNIFICANT CHANGE UP (ref 10–45)
ANION GAP SERPL CALC-SCNC: 11 MMOL/L — SIGNIFICANT CHANGE UP (ref 5–17)
APTT BLD: 26.4 SEC — LOW (ref 27.5–35.5)
AST SERPL-CCNC: 25 U/L — SIGNIFICANT CHANGE UP (ref 10–40)
BILIRUB SERPL-MCNC: 0.2 MG/DL — SIGNIFICANT CHANGE UP (ref 0.2–1.2)
BUN SERPL-MCNC: 32 MG/DL — HIGH (ref 7–23)
CALCIUM SERPL-MCNC: 9.3 MG/DL — SIGNIFICANT CHANGE UP (ref 8.4–10.5)
CHLORIDE SERPL-SCNC: 105 MMOL/L — SIGNIFICANT CHANGE UP (ref 96–108)
CO2 SERPL-SCNC: 23 MMOL/L — SIGNIFICANT CHANGE UP (ref 22–31)
CREAT SERPL-MCNC: 1.69 MG/DL — HIGH (ref 0.5–1.3)
EGFR: 29 ML/MIN/1.73M2 — LOW
GLUCOSE BLDC GLUCOMTR-MCNC: 105 MG/DL — HIGH (ref 70–99)
GLUCOSE BLDC GLUCOMTR-MCNC: 171 MG/DL — HIGH (ref 70–99)
GLUCOSE BLDC GLUCOMTR-MCNC: 175 MG/DL — HIGH (ref 70–99)
GLUCOSE SERPL-MCNC: 181 MG/DL — HIGH (ref 70–99)
HCT VFR BLD CALC: 41.5 % — SIGNIFICANT CHANGE UP (ref 34.5–45)
HGB BLD-MCNC: 12.3 G/DL — SIGNIFICANT CHANGE UP (ref 11.5–15.5)
INR BLD: 1.01 RATIO — SIGNIFICANT CHANGE UP (ref 0.88–1.16)
LIDOCAIN IGE QN: 10 U/L — SIGNIFICANT CHANGE UP (ref 7–60)
MCHC RBC-ENTMCNC: 27.6 PG — SIGNIFICANT CHANGE UP (ref 27–34)
MCHC RBC-ENTMCNC: 29.6 GM/DL — LOW (ref 32–36)
MCV RBC AUTO: 93 FL — SIGNIFICANT CHANGE UP (ref 80–100)
NRBC # BLD: 0 /100 WBCS — SIGNIFICANT CHANGE UP (ref 0–0)
PLATELET # BLD AUTO: 225 K/UL — SIGNIFICANT CHANGE UP (ref 150–400)
POTASSIUM SERPL-MCNC: 4.9 MMOL/L — SIGNIFICANT CHANGE UP (ref 3.5–5.3)
POTASSIUM SERPL-SCNC: 4.9 MMOL/L — SIGNIFICANT CHANGE UP (ref 3.5–5.3)
PROT SERPL-MCNC: 7.2 G/DL — SIGNIFICANT CHANGE UP (ref 6–8.3)
PROTHROM AB SERPL-ACNC: 11.7 SEC — SIGNIFICANT CHANGE UP (ref 10.5–13.4)
RBC # BLD: 4.46 M/UL — SIGNIFICANT CHANGE UP (ref 3.8–5.2)
RBC # FLD: 16.6 % — HIGH (ref 10.3–14.5)
SODIUM SERPL-SCNC: 139 MMOL/L — SIGNIFICANT CHANGE UP (ref 135–145)
WBC # BLD: 7.79 K/UL — SIGNIFICANT CHANGE UP (ref 3.8–10.5)
WBC # FLD AUTO: 7.79 K/UL — SIGNIFICANT CHANGE UP (ref 3.8–10.5)

## 2023-07-19 PROCEDURE — 99223 1ST HOSP IP/OBS HIGH 75: CPT | Mod: GC

## 2023-07-19 DEVICE — AUTOTOME CANNULATING SPHINCTEROTOME RX 44 20MM: Type: IMPLANTABLE DEVICE | Status: FUNCTIONAL

## 2023-07-19 DEVICE — BLLN CRE DILATION 8-9-10MM: Type: IMPLANTABLE DEVICE | Status: FUNCTIONAL

## 2023-07-19 DEVICE — BLLN EXTRACT FUSION QUATRO 8.5 10 12 15MM: Type: IMPLANTABLE DEVICE | Status: FUNCTIONAL

## 2023-07-19 DEVICE — HYDRATOME 44: Type: IMPLANTABLE DEVICE | Status: FUNCTIONAL

## 2023-07-19 DEVICE — GWIRE STRT JAGWIRE 0.035IN X 450: Type: IMPLANTABLE DEVICE | Status: FUNCTIONAL

## 2023-07-19 RX ORDER — INSULIN LISPRO 100/ML
VIAL (ML) SUBCUTANEOUS
Refills: 0 | Status: DISCONTINUED | OUTPATIENT
Start: 2023-07-19 | End: 2023-07-20

## 2023-07-19 RX ORDER — DEXTROSE 50 % IN WATER 50 %
15 SYRINGE (ML) INTRAVENOUS ONCE
Refills: 0 | Status: DISCONTINUED | OUTPATIENT
Start: 2023-07-19 | End: 2023-07-20

## 2023-07-19 RX ORDER — LANOLIN ALCOHOL/MO/W.PET/CERES
3 CREAM (GRAM) TOPICAL AT BEDTIME
Refills: 0 | Status: DISCONTINUED | OUTPATIENT
Start: 2023-07-19 | End: 2023-07-20

## 2023-07-19 RX ORDER — ACETAMINOPHEN 500 MG
1000 TABLET ORAL ONCE
Refills: 0 | Status: COMPLETED | OUTPATIENT
Start: 2023-07-19 | End: 2023-07-19

## 2023-07-19 RX ORDER — GLUCAGON INJECTION, SOLUTION 0.5 MG/.1ML
1 INJECTION, SOLUTION SUBCUTANEOUS ONCE
Refills: 0 | Status: DISCONTINUED | OUTPATIENT
Start: 2023-07-19 | End: 2023-07-20

## 2023-07-19 RX ORDER — DEXTROSE 50 % IN WATER 50 %
25 SYRINGE (ML) INTRAVENOUS ONCE
Refills: 0 | Status: DISCONTINUED | OUTPATIENT
Start: 2023-07-19 | End: 2023-07-20

## 2023-07-19 RX ORDER — ASPIRIN/CALCIUM CARB/MAGNESIUM 324 MG
81 TABLET ORAL DAILY
Refills: 0 | Status: DISCONTINUED | OUTPATIENT
Start: 2023-07-19 | End: 2023-07-20

## 2023-07-19 RX ORDER — DEXTROSE 50 % IN WATER 50 %
12.5 SYRINGE (ML) INTRAVENOUS ONCE
Refills: 0 | Status: DISCONTINUED | OUTPATIENT
Start: 2023-07-19 | End: 2023-07-20

## 2023-07-19 RX ORDER — BUMETANIDE 0.25 MG/ML
1 INJECTION INTRAMUSCULAR; INTRAVENOUS DAILY
Refills: 0 | Status: DISCONTINUED | OUTPATIENT
Start: 2023-07-19 | End: 2023-07-19

## 2023-07-19 RX ORDER — ONDANSETRON 8 MG/1
4 TABLET, FILM COATED ORAL EVERY 8 HOURS
Refills: 0 | Status: DISCONTINUED | OUTPATIENT
Start: 2023-07-19 | End: 2023-07-20

## 2023-07-19 RX ORDER — INSULIN GLARGINE 100 [IU]/ML
12 INJECTION, SOLUTION SUBCUTANEOUS AT BEDTIME
Refills: 0 | Status: DISCONTINUED | OUTPATIENT
Start: 2023-07-20 | End: 2023-07-20

## 2023-07-19 RX ORDER — SODIUM CHLORIDE 9 MG/ML
500 INJECTION, SOLUTION INTRAVENOUS
Refills: 0 | Status: COMPLETED | OUTPATIENT
Start: 2023-07-19 | End: 2023-07-19

## 2023-07-19 RX ORDER — METOPROLOL TARTRATE 50 MG
100 TABLET ORAL EVERY 12 HOURS
Refills: 0 | Status: DISCONTINUED | OUTPATIENT
Start: 2023-07-19 | End: 2023-07-20

## 2023-07-19 RX ORDER — SPIRONOLACTONE 25 MG/1
12.5 TABLET, FILM COATED ORAL DAILY
Refills: 0 | Status: DISCONTINUED | OUTPATIENT
Start: 2023-07-19 | End: 2023-07-20

## 2023-07-19 RX ORDER — ONDANSETRON 8 MG/1
4 TABLET, FILM COATED ORAL ONCE
Refills: 0 | Status: COMPLETED | OUTPATIENT
Start: 2023-07-19 | End: 2023-07-19

## 2023-07-19 RX ORDER — PANTOPRAZOLE SODIUM 20 MG/1
40 TABLET, DELAYED RELEASE ORAL
Refills: 0 | Status: DISCONTINUED | OUTPATIENT
Start: 2023-07-19 | End: 2023-07-20

## 2023-07-19 RX ORDER — INSULIN LISPRO 100/ML
VIAL (ML) SUBCUTANEOUS AT BEDTIME
Refills: 0 | Status: DISCONTINUED | OUTPATIENT
Start: 2023-07-19 | End: 2023-07-20

## 2023-07-19 RX ORDER — ATORVASTATIN CALCIUM 80 MG/1
40 TABLET, FILM COATED ORAL AT BEDTIME
Refills: 0 | Status: DISCONTINUED | OUTPATIENT
Start: 2023-07-19 | End: 2023-07-20

## 2023-07-19 RX ORDER — INSULIN GLARGINE 100 [IU]/ML
12 INJECTION, SOLUTION SUBCUTANEOUS ONCE
Refills: 0 | Status: COMPLETED | OUTPATIENT
Start: 2023-07-19 | End: 2023-07-19

## 2023-07-19 RX ORDER — SODIUM CHLORIDE 9 MG/ML
1000 INJECTION, SOLUTION INTRAVENOUS
Refills: 0 | Status: DISCONTINUED | OUTPATIENT
Start: 2023-07-19 | End: 2023-07-20

## 2023-07-19 RX ORDER — GABAPENTIN 400 MG/1
200 CAPSULE ORAL DAILY
Refills: 0 | Status: DISCONTINUED | OUTPATIENT
Start: 2023-07-19 | End: 2023-07-20

## 2023-07-19 RX ORDER — ACETAMINOPHEN 500 MG
650 TABLET ORAL EVERY 6 HOURS
Refills: 0 | Status: DISCONTINUED | OUTPATIENT
Start: 2023-07-19 | End: 2023-07-20

## 2023-07-19 RX ORDER — BUMETANIDE 0.25 MG/ML
1 INJECTION INTRAMUSCULAR; INTRAVENOUS DAILY
Refills: 0 | Status: DISCONTINUED | OUTPATIENT
Start: 2023-07-19 | End: 2023-07-20

## 2023-07-19 RX ADMIN — SODIUM CHLORIDE 30 MILLILITER(S): 9 INJECTION, SOLUTION INTRAVENOUS at 12:49

## 2023-07-19 RX ADMIN — ONDANSETRON 4 MILLIGRAM(S): 8 TABLET, FILM COATED ORAL at 12:45

## 2023-07-19 RX ADMIN — Medication 400 MILLIGRAM(S): at 12:49

## 2023-07-19 NOTE — H&P ADULT - NSHPLABSRESULTS_GEN_ALL_CORE
LABS: When present labs, imaging, and ECG were personally reviewed                          12.3   7.79  )-----------( 225      ( 19 Jul 2023 16:48 )             41.5       07-19    139  |  105  |  32<H>  ----------------------------<  181<H>  4.9   |  23  |  1.69<H>    Ca    9.3      19 Jul 2023 16:48    TPro  7.2  /  Alb  3.8  /  TBili  0.2  /  DBili  x   /  AST  25  /  ALT  18  /  AlkPhos  118  07-19       LIVER FUNCTIONS - ( 19 Jul 2023 16:48 )  Alb: 3.8 g/dL / Pro: 7.2 g/dL / ALK PHOS: 118 U/L / ALT: 18 U/L / AST: 25 U/L / GGT: x                    Urinalysis Basic - ( 19 Jul 2023 16:48 )    Color: x / Appearance: x / SG: x / pH: x  Gluc: 181 mg/dL / Ketone: x  / Bili: x / Urobili: x   Blood: x / Protein: x / Nitrite: x   Leuk Esterase: x / RBC: x / WBC x   Sq Epi: x / Non Sq Epi: x / Bacteria: x        PT/INR - ( 19 Jul 2023 16:48 )   PT: 11.7 sec;   INR: 1.01 ratio         PTT - ( 19 Jul 2023 16:48 )  PTT:26.4 sec    Lactate Trend            CAPILLARY BLOOD GLUCOSE      POCT Blood Glucose.: 171 mg/dL (19 Jul 2023 17:45)            RADIOLOGY & ADDITIONAL TESTS:< from: Xray Chest 1 View- PORTABLE-Urgent (Xray Chest 1 View- PORTABLE-Urgent .) (05.12.23 @ 11:04) >      FINDINGS:  Median sternotomy wires.  The heart size is normal in size.  The lungs are clear.  There is no pleural effusion There is no pneumothorax.  No acute bony abnormality.    IMPRESSION:  Clear lungs.    --- End of Report ---    < end of copied text > When present labs, imaging, and ECG were personally reviewed  LABS:                         12.3   7.79  )-----------( 225      ( 19 Jul 2023 16:48 )             41.5       07-19    139  |  105  |  32<H>  ----------------------------<  181<H>  4.9   |  23  |  1.69<H>    Ca    9.3      19 Jul 2023 16:48    TPro  7.2  /  Alb  3.8  /  TBili  0.2  /  DBili  x   /  AST  25  /  ALT  18  /  AlkPhos  118  07-19       LIVER FUNCTIONS - ( 19 Jul 2023 16:48 )  Alb: 3.8 g/dL / Pro: 7.2 g/dL / ALK PHOS: 118 U/L / ALT: 18 U/L / AST: 25 U/L / GGT: x                    Urinalysis Basic - ( 19 Jul 2023 16:48 )    Color: x / Appearance: x / SG: x / pH: x  Gluc: 181 mg/dL / Ketone: x  / Bili: x / Urobili: x   Blood: x / Protein: x / Nitrite: x   Leuk Esterase: x / RBC: x / WBC x   Sq Epi: x / Non Sq Epi: x / Bacteria: x        PT/INR - ( 19 Jul 2023 16:48 )   PT: 11.7 sec;   INR: 1.01 ratio         PTT - ( 19 Jul 2023 16:48 )  PTT:26.4 sec    Lactate Trend            CAPILLARY BLOOD GLUCOSE      POCT Blood Glucose.: 171 mg/dL (19 Jul 2023 17:45)            RADIOLOGY & ADDITIONAL TESTS:< from: Xray Chest 1 View- PORTABLE-Urgent (Xray Chest 1 View- PORTABLE-Urgent .) (05.12.23 @ 11:04) >      FINDINGS:  Median sternotomy wires.  The heart size is normal in size.  The lungs are clear.  There is no pleural effusion There is no pneumothorax.  No acute bony abnormality.    IMPRESSION:  Clear lungs.    --- End of Report ---    < end of copied text >

## 2023-07-19 NOTE — MEDICAL STUDENT ADULT H&P (EDUCATION) - NSHPLABSRESULTS_GEN_ALL_CORE
12.3   7.79  )-----------( 225      ( 19 Jul 2023 16:48 )             41.5   07-19    139  |  105  |  32<H>  ----------------------------<  181<H>  4.9   |  23  |  1.69<H>    Ca    9.3      19 Jul 2023 16:48    TPro  7.2  /  Alb  3.8  /  TBili  0.2  /  DBili  x   /  AST  25  /  ALT  18  /  AlkPhos  118  07-19

## 2023-07-19 NOTE — H&P ADULT - PROBLEM SELECTOR PLAN 5
History of paroxysmal SVT  (discovered in 01/23)  on home eliquis 2.5 BID and metoprolol tartrate 100 BID  Per EP on prior admission poor candidate for ablation     Plan:  >c/w metoprolol tartrate with hold parameters  >holding eliquis until ok to resume per GI History of paroxysmal SVT  (discovered in 01/23)  also history of poss atrial flutter  on home eliquis 2.5 BID and metoprolol tartrate 100 BID  Per EP on prior admission in Jan/Feb '23 pt determined to be poor candidate for ablation     Plan:  >c/w metoprolol tartrate with hold parameters  >resume eliquis

## 2023-07-19 NOTE — H&P ADULT - HISTORY OF PRESENT ILLNESS
Citizen of [place]  ID: [id number]     84 YO woman with PMH of HTN, HLD, DM2, HFpEF (EF 65%), CAD s/p CABG, Breast CA s/p R partial mastectomy, rectal CA s/p resection with recent admission for acute rissa s/p ERCP w/ stent c/b gallbladder perf w/ perc rissa and recurrent SVT, several prior admissions for ADHF presenting from endoscopy for abdominal pain after ERCP. Patient underwent biliary stent removal, sphincterotomy and stone removal. After procedure, patient noted to have abdominal pain and nausea.      Of note, on recent admission, patient with several RRTs for SVT.     T98.1, HR 60, /50, on room air. Nausea now resolved after a dose of zosyn. Patient also received tylenol and 500 CC IVF @30 cc per hour. Patient now reporting complete resolution of her symptoms and reporting adequate appetite. Denying chest pain, abdominal pain, N/V/Diarrhea, SOB at this time.       Japanese  ID: [id number]    82 YO woman Spanish only speaking, with PMH of HTN, HLD, DM2, HFpEF (EF 65%), CAD s/p CABG, Breast CA s/p R partial mastectomy, rectal CA s/p resection with recent admission for acute rissa s/p ERCP w/ stent c/b gallbladder perf w/ perc rissa and recurrent SVT, ?atrial flutter? on eliquis, several prior admissions for ADHF presenting from endoscopy for abdominal pain after ERCP. Patient underwent biliary stent removal, sphincterotomy and stone removal. After procedure, patient noted to have abdominal pain and nausea.      Of note, on recent admission, patient with several RRTs for SVT.     T98.1, HR 60, /50, on room air. Nausea now resolved after a dose of zosyn. Patient also received tylenol and 500 CC IVF @30 cc per hour. Patient now reporting complete resolution of her symptoms and reporting adequate appetite. Denying chest pain, abdominal pain, N/V/Diarrhea, SOB at this time.

## 2023-07-19 NOTE — H&P ADULT - ATTENDING COMMENTS
Pt was seen and examined during key portion of E/M service. Case discussed with resident. H&P reviewed and edited where appropriate. Other than the following, I agree with the above history, exam, assessment, and plan.  84 YO woman with PMH of HTN, HLD, DM2, HFpEF (EF 65%), CAD s/p CABG, Breast CA s/p R partial mastectomy, rectal CA s/p resection with recent admission for acute rissa s/p ERCP w/ stent c/b gallbladder perf w/ perc rissa and recurrent SVT, ?atrial flutter? on eliquis, several prior admissions for ADHF presenting from endoscopy for abdominal pain after ERCP c/f possible ERCP pancreatitis. .  F/u GI recs. monitor for fever. fu with card, onc, gi as outpt. resume home meds.

## 2023-07-19 NOTE — MEDICAL STUDENT ADULT H&P (EDUCATION) - ASSESSMENT
84 yo F with PMH DM, HLD, HTN, breast cancer s/p r. mastectomy and chemotherapy c/b lymphedema, rectal cancer s/p LAR with ileostomy and reversal, chemotherapy and radiation, CHF s/p bypass, cholecystitis s/p perc rissa and tube placement admitted to the PICU for abdominal pain following ERCP c/f post ERCP pancreatitis. 82 yo F with PMH DM, HLD, HTN, HFpEF, breast cancer s/p r. mastectomy and chemotherapy c/b lymphedema, rectal cancer s/p LAR with ileostomy and reversal, chemotherapy, and radiation, CAD s/p bypass, total R hip and knee replacements, cholecystitis s/p perc rissa and tube placement admitted to the PICU for abdominal pain following ERCP c/f post ERCP pancreatitis. 84 yo F with PMH DM, HLD, HTN, HFpEF, breast cancer s/p r. mastectomy and chemotherapy c/b lymphedema, rectal cancer s/p LAR with ileostomy and reversal, chemotherapy, and radiation, CAD s/p bypass, total R hip and knee replacements, CBD stones s/p perc rissa and tube placement admitted to the PICU for abdominal pain following ERCP c/f post ERCP pancreatitis. 82 yo F with PMH DM, HLD, HTN, HFpEF, breast cancer s/p r. mastectomy and chemotherapy c/b lymphedema, rectal cancer s/p LAR with ileostomy and reversal, chemotherapy, and radiation, CAD s/p bypass, afib, total R hip and knee replacements, CBD stones s/p ERCP for removal admitted to the PICU for abdominal pain following ERCP c/f post ERCP pancreatitis.

## 2023-07-19 NOTE — H&P ADULT - PROBLEM SELECTOR PLAN 7
History of CKD baseline Cr ~ 1.45-1.6   Currently at baseline    Plan:  >ctm Cr  >renally dose meds   >avoid nephrotoxins

## 2023-07-19 NOTE — MEDICAL STUDENT ADULT H&P (EDUCATION) - NSHPSOCIALHISTORY_GEN_ALL_CORE
Lives alone in [place], lived with son until he passed away 1 year ago, now daughter visits everyday, acts as main caretaker. Also receives assistance from son's friend.    Never smoker  No alcohol

## 2023-07-19 NOTE — MEDICAL STUDENT ADULT H&P (EDUCATION) - NS MD HP STUD MEDICATIONS FT
atorvastatin 40mg qd at night  gabapentin 100mg bid at night  metoprolol succinate 100mg bid AM and PM  pantop na 40mg qd AM  spironolactone 25mg (1/2 tab qd)  bumetanide 1mg qd  ferrous sulfate 25mg qd AM  eliquis 2.5mg bid AM and PM  insulin novolog pen 7 units tid premeal  basaglar quickpen 21 units at bedtime  jeremiah 81mg qd  Vitamin D3 2000 IU  Vit B12 1000 3/wk atorvastatin 40mg qd at night  gabapentin 100mg bid at night  metoprolol succinate 100mg bid AM and PM  pantoprazole 40mg qd AM  spironolactone 25mg (1/2 tab qd)  bumetanide 1mg qd  ferrous sulfate 25mg qd AM  eliquis 2.5mg bid AM and PM  insulin novolog pen 7 units tid premeal  basaglar quickpen 21 units at bedtime  jeremiah 81mg qd  Vitamin D3 2000 IU  Vit B12 1000 3/wk atorvastatin 40mg qd at night  gabapentin 100mg bid at night  metoprolol succinate 100mg bid AM and PM  pantoprazole 40mg qd AM  spironolactone 25mg (1/2 tab qd)  bumetanide 1mg qd  ferrous sulfate 25mg qd AM  eliquis 2.5mg bid AM and PM  insulin novolog pen 7 units tid premeal  basaglar quickpen 21 units at bedtime  aspirin 81mg qd  Vitamin D3 2000 IU  Vit B12 1000 3/wk

## 2023-07-19 NOTE — ASU PATIENT PROFILE, ADULT - NSICDXPASTMEDICALHX_GEN_ALL_CORE_FT
PAST MEDICAL HISTORY:  Breast CA, right 1989 s/p mastectomy ,IV Chemotherapy    CHF (congestive heart failure)     DVT of leg (deep venous thrombosis) right calf DVT  2/2019 pt on Eliquis    Gallstone     HTN (hypertension)     Hyperlipidemia     Lymphedema of arm right arm s/p mastectomy    Obese     Rectal cancer s/p chemotherapy and  radiation 12 weeks 2/2015 -3/2015    Renal insufficiency     Severe pulmonary hypertension     Type II diabetes mellitus

## 2023-07-19 NOTE — H&P ADULT - PROBLEM SELECTOR PLAN 2
History of biliary stone and biliary candidiasis s/p perc rissa, biliary stent   now s/p stent removal, stone removal and sphincterotomy    Plan:  >GI following, appreciate recs

## 2023-07-19 NOTE — H&P ADULT - PROBLEM SELECTOR PLAN 3
HFpEF with EF 65%   s/p 500 cc fluids   on home spironolactone 12.5 mg QD, Bumex 1 mg QD    Plan:  >c/w home diuretic medications HFpEF with EF 65% (5/06/2023)  s/p 500 cc fluids   on home spironolactone 12.5 mg QD, Bumex 1 mg QD  Currently appears euvolemic on clinical exam     Plan:  >c/w home diuretic medications

## 2023-07-19 NOTE — MEDICAL STUDENT ADULT H&P (EDUCATION) - NSHPPHYSICALEXAM_GEN_ALL_CORE
Vital Signs Last 24 Hrs  T(C): 36.7 (19 Jul 2023 20:39), Max: 36.7 (19 Jul 2023 17:31)  T(F): 98 (19 Jul 2023 20:39), Max: 98.1 (19 Jul 2023 17:31)  HR: 92 (19 Jul 2023 20:39) (60 - 92)  BP: 164/75 (19 Jul 2023 20:39) (146/64 - 189/94)  BP(mean): --  RR: 18 (19 Jul 2023 20:39) (14 - 22)  SpO2: 95% (19 Jul 2023 20:39) (91% - 97%)    Parameters below as of 19 Jul 2023 20:39  Patient On (Oxygen Delivery Method): room air        PHYSICAL EXAM:  GENERAL: NAD, well-groomed  HEAD:  Atraumatic, Normocephalic  EYES: EOMI, PERRLA, conjunctiva and sclera clear  NECK: Supple, No JVD  NERVOUS SYSTEM: AOX3, motor and sensation grossly intact in b/l UE and b/l LE  PSYCHIATRIC: Appropriate affect and mood  CHEST/LUNG: Clear to auscultation bilaterally; No rales, rhonchi, wheezing, or rubs  HEART: Regular rate and rhythm; No murmurs, rubs, or gallops. No LE edema  ABDOMEN: Soft, Nontender, Nondistended; Bowel sounds present  SKIN: Erythema

## 2023-07-19 NOTE — MEDICAL STUDENT ADULT H&P (EDUCATION) - HISTORY OF PRESENT ILLNESS
83 year old female with a PMH of DM, HLD, HTN, breast cancer s/p r. mastectomy and chemotherapy c/b lymphedema, rectal cancer s/p LAR with ileostomy and reversal, chemotherapy and radiation, CAD s/p bypass, HFpEF, gallstones, cholecystitis s/p perc rissa and tube placement admitted to the PICU after ECRP earlier today for CBD stone visualization. Patient reports abdominal pain began 6 months ago, presented to [facility] ER, diagnosed with cholecystitis, deemed not a surgical candidate. For the past six months has continued to have 10/10 abdominal pain and vomiting, nothing relieves the pain, exacerbated with eating.  83 year old female with a PMH of DM, HLD, HTN, breast cancer s/p r. mastectomy and chemotherapy c/b lymphedema, rectal cancer s/p LAR with ileostomy and reversal, chemotherapy and radiation, CAD s/p bypass, HFpEF, gallstones, cholecystitis s/p perc rissa and tube placement admitted to the PICU after ECRP earlier today for CBD stone visualization. Patient reports abdominal pain began 6 months ago, presented to [facility] ER, diagnosed with cholelithiasis, at least one large, sphincterotomy performed and stent placed. For the past six months has continued to have 10/10 abdominal pain and vomiting, nothing relieves the pain, exacerbated with eating. Patient admitted for post ERCP abdominal pain and nausea after outpatient ERCP today with biliary stent removal, extension of biliary sphincterotomy, balloon sphincteroplasty, and stone removal c/f post ERCP pancreatitis. Nausea resolved with zofran.    83 year old female with a PMH of DM, HLD, HTN, breast cancer s/p r. mastectomy and chemotherapy c/b lymphedema, rectal cancer s/p LAR with ileostomy and reversal, chemotherapy and radiation, CAD s/p bypass, HFpEF, afib, gallstones, cholecystitis s/p perc rissa and tube placement admitted to the PICU after ECRP earlier today for CBD stone visualization. Patient reports abdominal pain began 6 months ago, presented to [facility] ER, diagnosed with cholelithiasis, at least one large, sphincterotomy performed and stent placed. For the past six months has continued to have 10/10 abdominal pain and vomiting, nothing relieves the pain, exacerbated with eating. Patient admitted for post ERCP abdominal pain and nausea after outpatient ERCP today with biliary stent removal, extension of biliary sphincterotomy, balloon sphincteroplasty, and stone removal c/f post ERCP pancreatitis. Nausea resolved with zofran.    83 year old female with a PMH of DM, HLD, HTN, breast cancer s/p r. mastectomy and chemotherapy c/b lymphedema, rectal cancer s/p LAR with ileostomy and reversal, chemotherapy and radiation, CAD s/p bypass, HFpEF, afib, gallstones, cholecystitis s/p perc rissa and tube placement admitted to the PICU after ECRP earlier today for CBD stone visualization. Patient reports abdominal pain began 6 months ago, presented to [facility] ER, diagnosed with cholelithiasis, at least one large, sphincterotomy performed and stent placed. For the past six months has continued to have 10/10 abdominal pain and vomiting, nothing relieves the pain, exacerbated with eating. Patient admitted for post ERCP abdominal pain and nausea after outpatient ERCP today with biliary stent removal, extension of biliary sphincterotomy, balloon sphincteroplasty, and stone removal c/f post ERCP pancreatitis. Nausea resolved with zofran. Pain controlled with tylenol.

## 2023-07-19 NOTE — H&P ADULT - PROBLEM SELECTOR PLAN 4
T2DM on Basaglar 21 units at bedtime and 7 units premeal TID     Plan:   >STAT lantus 12 units given on admission   >c/w lantus 12u at bedtime  >KYLAH; uptitrate PRN  >f/u AM A1c

## 2023-07-19 NOTE — H&P ADULT - ASSESSMENT
84 YO woman with PMH of HTN, HLD, DM2, HFpEF (EF 65%), CAD s/p CABG, Breast CA s/p R partial mastectomy, rectal CA s/p resection with recent admission for acute rissa s/p ERCP w/ stent c/b gallbladder perf w/ perc rissa and recurrent SVT, several prior admissions for ADHF presenting from endoscopy for abdominal pain after ERCP c/f possible ERCP pancreatitis.  82 YO woman with PMH of HTN, HLD, DM2, HFpEF (EF 65%), CAD s/p CABG, Breast CA s/p R partial mastectomy, rectal CA s/p resection with recent admission for acute rissa s/p ERCP w/ stent c/b gallbladder perf w/ perc rissa and recurrent SVT, ?atrial flutter? on eliquis, several prior admissions for ADHF presenting from endoscopy for abdominal pain after ERCP c/f possible ERCP pancreatitis.

## 2023-07-19 NOTE — CONSULT NOTE ADULT - NSCONSULTADDITIONALINFOA_GEN_ALL_CORE
Impression:    Post ERCP abd pain, most likely pancreatitis.  Choledocholithiasis, s/p clearance by ERCP today 7/19/23.  Congestive heart failure, with exacerbation twice this year.  Severe pulmonary hypertension    Recommendations:    CBC/CMP/amylase/lipase  Upright Chest X-Ray and abdominal X ray  Clear liquid diet   Gentle IV fluids given CHF  Pain control (Tylenol 1 gram IV given postprocedurally).

## 2023-07-19 NOTE — MEDICAL STUDENT ADULT H&P (EDUCATION) - NS MD HP STUD PMH FT
T2DM  Breast cancer (R) 1988  CHF  DVT (R calf)  Gallstones  HTN  HLD  Lymphedema (R)  Rectal cancer  Appendicitis  CHF T2DM  Breast cancer (R) 1988  CHF  DVT (R calf)  Gallstones  HTN  HLD  Lymphedema (R)  Rectal cancer  Appendicitis  CHF  Afib

## 2023-07-19 NOTE — ASU PATIENT PROFILE, ADULT - NS PRO AD ANY ON CHART
Called pt, no answer; left message informing pt I was calling to remind pt of his in office EAWV on 6/29/23 and to return my call if he had any questions; left my name and number    No

## 2023-07-19 NOTE — MEDICAL STUDENT ADULT H&P (EDUCATION) - NSHPREVIEWOFSYSTEMS_GEN_ALL_CORE
REVIEW OF SYSTEMS      General: no weakness, changes in sleep    Skin/Breast: no new rashes or changes in skin color  	  Ophthalmologic: no visual changes or pain  	  ENMT: no pain    Respiratory and Thorax: no shortness of breath  	  Cardiovascular: no chest pain    Gastrointestinal: no current abdominal pain, no changes in bowel movements    Genitourinary: no dysuria, frequency, hematuria    Musculoskeletal: no numbness or weakness    Hematology/Lymphatics: swelling in right arm since mastectomy, no changes

## 2023-07-19 NOTE — H&P ADULT - PROBLEM SELECTOR PLAN 1
Abdominal pain s/p ERCP   resolved after 500 cc fluids and zofran   Lipase 10   Initial signs and symptoms of pancreatitis however now resolved    Plan:  >CLD per GI  >Upright chest xray/abdominal xray  >consider CT scan if high suspicion for pancreatitis  >consider IVF if worsening abdominal pain c/w pancreatitis Abdominal pain s/p ERCP   resolved after 500 cc fluids and zofran   Lipase 10   Initial signs and symptoms of pancreatitis however now resolved    Plan:  - advance diet  >Upright chest xray/abdominal xray  >would get CT scan if persistent or worsening abd pain

## 2023-07-19 NOTE — ASU PATIENT PROFILE, ADULT - FALL HARM RISK - RISK INTERVENTIONS

## 2023-07-19 NOTE — PRE PROCEDURE NOTE - PRE PROCEDURE EVALUATION
Attending Physician:   Santo                         Procedure:  ERCP    Indication for Procedure:  Large CBD stones  ________________________________________________________  PAST MEDICAL & SURGICAL HISTORY:  Breast CA, right  1989 s/p mastectomy ,IV Chemotherapy      HTN (hypertension)      Lymphedema of arm  right arm s/p mastectomy      Hyperlipidemia      Rectal cancer  s/p chemotherapy and  radiation 12 weeks 2/2015 -3/2015      Obese      Type II diabetes mellitus      CHF (congestive heart failure)      DVT of leg (deep venous thrombosis)  right calf DVT  2/2019 pt on Eliquis      Renal insufficiency      Gallstone      Severe pulmonary hypertension      S/P mastectomy, right  1989      History of appendectomy  1953      S/P ileostomy  low anterior resection-8/10/15, reversal of ileostomy 2016      S/P colon resection  2015      S/P TKR (total knee replacement), right  2017      S/P hip replacement, right      Herniated lumbar intervertebral disc        ALLERGIES:  penicillin (Unknown)  CT scan dye (Hives)    HOME MEDICATIONS:  apixaban 2.5 mg oral tablet: 1 tab(s) orally every 12 hours  aspirin 81 mg oral tablet, chewable: 1 tab(s) orally once a day  atorvastatin 40 mg oral tablet: 1 tab(s) orally once a day (at bedtime)  Basaglar KwikPen 100 units/mL subcutaneous solution: 21 unit(s) subcutaneous once a day (at bedtime)  bumetanide 1 mg oral tablet: 1 tab(s) orally once a day  gabapentin 100 mg oral capsule: 2 cap(s) orally once a day  metoprolol tartrate 100 mg oral tablet: 1 tab(s) orally every 12 hours  NovoLOG FlexPen 100 units/mL injectable solution: 7 injectable 3 times a day (before meals)  pantoprazole 40 mg oral delayed release tablet: 1 tab(s) orally 2 times a day    AICD/PPM: [ ] yes   [x ] no    PERTINENT LAB DATA:                      PHYSICAL EXAMINATION:    Height (cm): 152.4  Weight (kg): 79.8  BMI (kg/m2): 34.4  BSA (m2): 1.77T(C): 36.4  HR: 67  BP: 146/64  RR: 19  SpO2: 95%    Constitutional: NAD  HEENT: anicteric  Neck:  No JVD  Respiratory: CTAB/L  Cardiovascular: S1 and S2  Gastrointestinal: BS+, soft, NT/ND  Extremities: No peripheral edema  Neurological: No confusion  Psychiatric: Normal mood, normal affect  Skin: No jaundice    ASA Class: I [ ]  II [ ]  III [ x]  IV [ ]    COMMENTS:    The patient is a suitable candidate for the planned procedure unless box checked [ ]  No, explain:

## 2023-07-19 NOTE — MEDICAL STUDENT ADULT H&P (EDUCATION) - NS MD HP STUD PSH FT
Mastectomy (1988)  Appendectomy  LAR w/ileostomy  Ileostomy reversal  Total knee replacement (R)  CABG Mastectomy (1988)  Appendectomy  LAR w/ileostomy  Ileostomy reversal  Total knee replacement (R)  Total hip replacement (R)  CABG

## 2023-07-19 NOTE — H&P ADULT - NSHPPHYSICALEXAM_GEN_ALL_CORE
T(C): 36.7 (07-19-23 @ 20:39), Max: 36.7 (07-19-23 @ 17:31)  HR: 92 (07-19-23 @ 20:39) (60 - 92)  BP: 164/75 (07-19-23 @ 20:39) (146/64 - 189/94)  RR: 18 (07-19-23 @ 20:39) (14 - 22)  SpO2: 95% (07-19-23 @ 20:39) (91% - 97%)    GENERAL: patient appears well, no acute distress, appropriate, pleasant  EYES: sclera clear, no exudates  ENMT: oropharynx clear without erythema, no exudates, moist mucous membranes  NECK: supple, soft, no thyromegaly noted, no jvd noted   LUNGS: good air entry bilaterally, clear to auscultation, symmetric breath sounds, no wheezing or rhonchi appreciated  HEART: soft S1/S2, regular rate and rhythm, no murmurs noted, no lower extremity edema  GASTROINTESTINAL: abdomen is soft, nontender, nondistended, normoactive bowel sounds, no palpable masses  INTEGUMENT: good skin turgor, no lesions noted  MUSCULOSKELETAL: no clubbing or cyanosis, no obvious deformity  NEUROLOGIC: awake, alert, oriented x3, good muscle tone in 4 extremities, no obvious sensory deficits  PSYCHIATRIC: mood is good, affect is congruent, linear and logical thought process  HEME/LYMPH: no palpable supraclavicular nodules, no obvious ecchymosis or petechiae

## 2023-07-19 NOTE — H&P ADULT - PROBLEM SELECTOR PLAN 8
Diet: CLD; advance per GI  DVT: SCDs until able to resume eliquis  Dispo: Pending PT eval  Code Status: Prior MOLST however patient would like to wait until daughter at bedside to re-fill out MOLST ([place]  used for the entire interview)

## 2023-07-19 NOTE — CONSULT NOTE ADULT - SUBJECTIVE AND OBJECTIVE BOX
Patient admitted for post ERCP abdominal pain after outpatient ERCP today with biliary stent removal, extension of biliary sphincterotomy, balloon sphincteroplasty, and stone removal.    Following procedure, patient had moderate upper abd pain and nausea.  Nausea resolved with Zofran 8 mg.  Pain persists.    History significant for:  Had upper endoscopy, endoscopic ultrasound, ERCP January 20, 2023.    Multiple stones were visualized in the common bile duct, at least 1 of which was large. Sphincterotomy was performed. There was a small periampullary diverticulum. 10 Serbian by 5 cm stent was placed.    Since then hospitalized twice for congestive heart failure exacerbation.  Last hospitalization 3 weeks in duration.  Did not require rehabilitation or nursing home post discharge.    Back to baseline exercise tolerance,  ft with Rolling Walker  No oxygen supplementation needed    Has occasional nocturnal abdominal discomfort, with associated nausea, rare vomiting. She has had this during the hospitalization and also last night. No fevers, chills, sweats. No constipation or diarrhea. No jaundice.    Denies chest pain.

## 2023-07-20 ENCOUNTER — TRANSCRIPTION ENCOUNTER (OUTPATIENT)
Age: 83
End: 2023-07-20

## 2023-07-20 VITALS
HEART RATE: 76 BPM | TEMPERATURE: 98 F | SYSTOLIC BLOOD PRESSURE: 159 MMHG | OXYGEN SATURATION: 93 % | RESPIRATION RATE: 18 BRPM | DIASTOLIC BLOOD PRESSURE: 83 MMHG

## 2023-07-20 LAB
ALBUMIN SERPL ELPH-MCNC: 3.5 G/DL — SIGNIFICANT CHANGE UP (ref 3.3–5)
ALP SERPL-CCNC: 104 U/L — SIGNIFICANT CHANGE UP (ref 40–120)
ALT FLD-CCNC: 15 U/L — SIGNIFICANT CHANGE UP (ref 10–45)
ANION GAP SERPL CALC-SCNC: 12 MMOL/L — SIGNIFICANT CHANGE UP (ref 5–17)
AST SERPL-CCNC: 17 U/L — SIGNIFICANT CHANGE UP (ref 10–40)
BILIRUB SERPL-MCNC: 0.2 MG/DL — SIGNIFICANT CHANGE UP (ref 0.2–1.2)
BUN SERPL-MCNC: 33 MG/DL — HIGH (ref 7–23)
CALCIUM SERPL-MCNC: 9.2 MG/DL — SIGNIFICANT CHANGE UP (ref 8.4–10.5)
CHLORIDE SERPL-SCNC: 105 MMOL/L — SIGNIFICANT CHANGE UP (ref 96–108)
CO2 SERPL-SCNC: 23 MMOL/L — SIGNIFICANT CHANGE UP (ref 22–31)
CREAT SERPL-MCNC: 1.75 MG/DL — HIGH (ref 0.5–1.3)
EGFR: 28 ML/MIN/1.73M2 — LOW
GLUCOSE BLDC GLUCOMTR-MCNC: 124 MG/DL — HIGH (ref 70–99)
GLUCOSE BLDC GLUCOMTR-MCNC: 129 MG/DL — HIGH (ref 70–99)
GLUCOSE BLDC GLUCOMTR-MCNC: 157 MG/DL — HIGH (ref 70–99)
GLUCOSE SERPL-MCNC: 138 MG/DL — HIGH (ref 70–99)
HCT VFR BLD CALC: 39 % — SIGNIFICANT CHANGE UP (ref 34.5–45)
HGB BLD-MCNC: 11.6 G/DL — SIGNIFICANT CHANGE UP (ref 11.5–15.5)
MAGNESIUM SERPL-MCNC: 1.8 MG/DL — SIGNIFICANT CHANGE UP (ref 1.6–2.6)
MCHC RBC-ENTMCNC: 28 PG — SIGNIFICANT CHANGE UP (ref 27–34)
MCHC RBC-ENTMCNC: 29.7 GM/DL — LOW (ref 32–36)
MCV RBC AUTO: 94 FL — SIGNIFICANT CHANGE UP (ref 80–100)
NRBC # BLD: 0 /100 WBCS — SIGNIFICANT CHANGE UP (ref 0–0)
PHOSPHATE SERPL-MCNC: 3.7 MG/DL — SIGNIFICANT CHANGE UP (ref 2.5–4.5)
PLATELET # BLD AUTO: 318 K/UL — SIGNIFICANT CHANGE UP (ref 150–400)
POTASSIUM SERPL-MCNC: 4.7 MMOL/L — SIGNIFICANT CHANGE UP (ref 3.5–5.3)
POTASSIUM SERPL-SCNC: 4.7 MMOL/L — SIGNIFICANT CHANGE UP (ref 3.5–5.3)
PROT SERPL-MCNC: 7 G/DL — SIGNIFICANT CHANGE UP (ref 6–8.3)
RBC # BLD: 4.15 M/UL — SIGNIFICANT CHANGE UP (ref 3.8–5.2)
RBC # FLD: 16.8 % — HIGH (ref 10.3–14.5)
SODIUM SERPL-SCNC: 140 MMOL/L — SIGNIFICANT CHANGE UP (ref 135–145)
WBC # BLD: 9.09 K/UL — SIGNIFICANT CHANGE UP (ref 3.8–10.5)
WBC # FLD AUTO: 9.09 K/UL — SIGNIFICANT CHANGE UP (ref 3.8–10.5)

## 2023-07-20 PROCEDURE — 83735 ASSAY OF MAGNESIUM: CPT

## 2023-07-20 PROCEDURE — C1769: CPT

## 2023-07-20 PROCEDURE — 85027 COMPLETE CBC AUTOMATED: CPT

## 2023-07-20 PROCEDURE — 99239 HOSP IP/OBS DSCHRG MGMT >30: CPT

## 2023-07-20 PROCEDURE — 43264 ERCP REMOVE DUCT CALCULI: CPT

## 2023-07-20 PROCEDURE — 93005 ELECTROCARDIOGRAM TRACING: CPT

## 2023-07-20 PROCEDURE — 74019 RADEX ABDOMEN 2 VIEWS: CPT | Mod: 26

## 2023-07-20 PROCEDURE — 84100 ASSAY OF PHOSPHORUS: CPT

## 2023-07-20 PROCEDURE — 43275 ERCP REMOVE FORGN BODY DUCT: CPT

## 2023-07-20 PROCEDURE — 71046 X-RAY EXAM CHEST 2 VIEWS: CPT | Mod: 26

## 2023-07-20 PROCEDURE — 85730 THROMBOPLASTIN TIME PARTIAL: CPT

## 2023-07-20 PROCEDURE — 83690 ASSAY OF LIPASE: CPT

## 2023-07-20 PROCEDURE — C1726: CPT

## 2023-07-20 PROCEDURE — 97161 PT EVAL LOW COMPLEX 20 MIN: CPT

## 2023-07-20 PROCEDURE — 85610 PROTHROMBIN TIME: CPT

## 2023-07-20 PROCEDURE — 82962 GLUCOSE BLOOD TEST: CPT

## 2023-07-20 PROCEDURE — 80053 COMPREHEN METABOLIC PANEL: CPT

## 2023-07-20 PROCEDURE — 74019 RADEX ABDOMEN 2 VIEWS: CPT

## 2023-07-20 PROCEDURE — C9399: CPT

## 2023-07-20 PROCEDURE — 74330 X-RAY BILE/PANC ENDOSCOPY: CPT

## 2023-07-20 PROCEDURE — 93010 ELECTROCARDIOGRAM REPORT: CPT | Mod: 76

## 2023-07-20 PROCEDURE — 71046 X-RAY EXAM CHEST 2 VIEWS: CPT

## 2023-07-20 RX ORDER — APIXABAN 2.5 MG/1
2.5 TABLET, FILM COATED ORAL
Refills: 0 | Status: DISCONTINUED | OUTPATIENT
Start: 2023-07-20 | End: 2023-07-20

## 2023-07-20 RX ADMIN — APIXABAN 2.5 MILLIGRAM(S): 2.5 TABLET, FILM COATED ORAL at 05:20

## 2023-07-20 RX ADMIN — SPIRONOLACTONE 12.5 MILLIGRAM(S): 25 TABLET, FILM COATED ORAL at 05:20

## 2023-07-20 RX ADMIN — PANTOPRAZOLE SODIUM 40 MILLIGRAM(S): 20 TABLET, DELAYED RELEASE ORAL at 05:19

## 2023-07-20 RX ADMIN — Medication 1: at 17:11

## 2023-07-20 RX ADMIN — INSULIN GLARGINE 12 UNIT(S): 100 INJECTION, SOLUTION SUBCUTANEOUS at 00:11

## 2023-07-20 RX ADMIN — Medication 100 MILLIGRAM(S): at 05:19

## 2023-07-20 RX ADMIN — BUMETANIDE 1 MILLIGRAM(S): 0.25 INJECTION INTRAMUSCULAR; INTRAVENOUS at 05:19

## 2023-07-20 RX ADMIN — APIXABAN 2.5 MILLIGRAM(S): 2.5 TABLET, FILM COATED ORAL at 17:11

## 2023-07-20 RX ADMIN — Medication 100 MILLIGRAM(S): at 17:11

## 2023-07-20 RX ADMIN — GABAPENTIN 200 MILLIGRAM(S): 400 CAPSULE ORAL at 13:04

## 2023-07-20 RX ADMIN — Medication 81 MILLIGRAM(S): at 13:06

## 2023-07-20 NOTE — PROGRESS NOTE ADULT - PROBLEM SELECTOR PLAN 4
T2DM on Basaglar 21 units at bedtime and 7 units premeal TID     Plan:   >STAT lantus 12 units given on admission   >c/w lantus 12u at bedtime  >KYLAH; uptitrate PRN  >f/u AM A1c T2DM on Basaglar 21 units at bedtime and 7 units premeal TID     - KYLAH

## 2023-07-20 NOTE — PROGRESS NOTE ADULT - ASSESSMENT
82 YO woman with PMH of HTN, HLD, DM2, HFpEF (EF 65%), CAD s/p CABG (2020), Breast CA s/p R partial mastectomy, rectal CA s/p resection with recent admission for acute rissa s/p ERCP w/ stent c/b gallbladder perf w/ perc rissa and recurrent SVT, ?atrial flutter on eliquis, several prior admissions for ADHF admitted s/p ERCP (7/19) for post-procedure abdominal pain after ERCP. Normal lipase 10, abd pain now resolved 7/20 AM. No current c/f post-ERCP pancreatitis. Tolerating diet.     #choledocholithiasis w/o c/f post-ERCP pancreatitis  - s/p EGD/ERCP (7/19):  EGD: Anatomy consistent with cricopharyngeal bar and small Zenker's diverticulum. 0.035 wire placed into stomach with endoscopic guidance.  ERCP: One stent was removed from the biliary tree. Choledocholithiasis was found in a moderately dilated main bile duct. Complete removal was accomplished by extension of biliary sphincterotomy, balloon sphincteroplasty, and balloon extraction.    Recs:  - advanced diet as tolerated    OK to discharge from GI standpoint. We will sign off at this time. Please reconsult/page if questions.     Followup in GI office in 1-2 months with Dr. Joseph Blanchard. Call [phone] for appointment.    **THIS NOTE IS NOT FINALIZED UNTIL SIGNED BY THE ATTENDING**    Kavitha Castillo MD  GI Fellow, PGY-6  Available via Microsoft Teams    NON-URGENT CONSULTS:  Please email [email or web address]  AT NIGHT AND ON WEEKENDS:  Contact on-call GI fellow via answering service ([phone]) from 5pm-8am and on weekends/holidays  MONDAY-FRIDAY 8AM-5PM:  Pager# [phone] ([facility]) or [phone] ([facility])    
84 YO woman with PMH of HTN, HLD, DM2, HFpEF (EF 65%), CAD s/p CABG, Breast CA s/p R partial mastectomy, rectal CA s/p resection with recent admission for acute rissa s/p ERCP w/ stent c/b gallbladder perf w/ perc rissa and recurrent SVT, ?atrial flutter? on eliquis, several prior admissions for ADHF presenting from endoscopy for abdominal pain after ERCP c/f possible ERCP pancreatitis.

## 2023-07-20 NOTE — DISCHARGE NOTE PROVIDER - NSDCCPCAREPLAN_GEN_ALL_CORE_FT
PRINCIPAL DISCHARGE DIAGNOSIS  Diagnosis: Abdominal pain  Assessment and Plan of Treatment: You came to the hospital because of pain in your abdomen.  Because you recently had an ERCP procedure, we were concerned that you may have developed pancreatitis.  However, when we tested you for pancreatitis, we were reassured that you did not have that condition.  With fluids and some medications, your abdominal pain improved.  Soon afterwards you were able to tolerate eating food again.  By the time of discharge you felt much better.  Please call a doctor or go to the emergency room right away if:  - You begin to have moderate or severe abdominal pain  - You begin to experience nausea or vomiting  - You develop a fever  - You develop moderate or severe diarrhea  - If you develop abdominal pain after eating

## 2023-07-20 NOTE — PROGRESS NOTE ADULT - PROBLEM SELECTOR PLAN 3
HFpEF with EF 65% (5/06/2023)  s/p 500 cc fluids   on home spironolactone 12.5 mg QD, Bumex 1 mg QD  Currently appears euvolemic on clinical exam     Plan:  >c/w home diuretic medications HFpEF with EF 65% (5/06/2023).  s/p 500 cc fluids.  On home spironolactone 12.5 mg QD, Bumex 1 mg QD.  Currently appears euvolemic on clinical exam     - c/w home diuretic medications

## 2023-07-20 NOTE — PROGRESS NOTE ADULT - SUBJECTIVE AND OBJECTIVE BOX
***************************************************************  Nayan Neely, PG1  Internal Medicine   pager:  [facility]:  [phone] [facility]: [phone]  ***************************************************************    NURIA GARCIA  83y  MRN: [id number]    Patient is a 83y old  Female who presents with a chief complaint of Post ERCP Abdominal Pain (19 Jul 2023 22:29)    Interval/Overnight Events: no events ON.     Subjective: Pt seen and examined at bedside. Denies fever, CP, SOB, abn pain, N/V, dysuria. Has been NPO, trialing carb counting diet this morning.      MEDICATIONS  (STANDING):  apixaban 2.5 milliGRAM(s) Oral two times a day  aspirin  chewable 81 milliGRAM(s) Oral daily  atorvastatin 40 milliGRAM(s) Oral at bedtime  buMETAnide 1 milliGRAM(s) Oral daily  dextrose 5%. 1000 milliLiter(s) (50 mL/Hr) IV Continuous <Continuous>  dextrose 5%. 1000 milliLiter(s) (100 mL/Hr) IV Continuous <Continuous>  dextrose 50% Injectable 25 Gram(s) IV Push once  dextrose 50% Injectable 12.5 Gram(s) IV Push once  dextrose 50% Injectable 25 Gram(s) IV Push once  gabapentin 200 milliGRAM(s) Oral daily  glucagon  Injectable 1 milliGRAM(s) IntraMuscular once  insulin glargine Injectable (LANTUS) 12 Unit(s) SubCutaneous at bedtime  insulin lispro (ADMELOG) corrective regimen sliding scale   SubCutaneous three times a day before meals  insulin lispro (ADMELOG) corrective regimen sliding scale   SubCutaneous at bedtime  metoprolol tartrate 100 milliGRAM(s) Oral every 12 hours  pantoprazole    Tablet 40 milliGRAM(s) Oral before breakfast  spironolactone 12.5 milliGRAM(s) Oral daily    MEDICATIONS  (PRN):  acetaminophen     Tablet .. 650 milliGRAM(s) Oral every 6 hours PRN Temp greater or equal to 38C (100.4F), Mild Pain (1 - 3)  aluminum hydroxide/magnesium hydroxide/simethicone Suspension 30 milliLiter(s) Oral every 4 hours PRN Dyspepsia  dextrose Oral Gel 15 Gram(s) Oral once PRN Blood Glucose LESS THAN 70 milliGRAM(s)/deciliter  melatonin 3 milliGRAM(s) Oral at bedtime PRN Insomnia  ondansetron Injectable 4 milliGRAM(s) IV Push every 8 hours PRN Nausea and/or Vomiting      Objective:    Vitals: Vital Signs Last 24 Hrs  T(C): 36.6 (07-20-23 @ 04:41), Max: 36.7 (07-19-23 @ 17:31)  T(F): 97.9 (07-20-23 @ 04:41), Max: 98.1 (07-19-23 @ 17:31)  HR: 66 (07-20-23 @ 04:41) (60 - 92)  BP: 154/79 (07-20-23 @ 05:04) (121/61 - 189/94)  BP(mean): --  RR: 18 (07-20-23 @ 04:41) (14 - 22)  SpO2: 91% (07-20-23 @ 04:41) (91% - 97%)                I&O's Summary    19 Jul 2023 07:01  -  20 Jul 2023 07:00  --------------------------------------------------------  IN: 360 mL / OUT: 350 mL / NET: 10 mL        PHYSICAL EXAM:  GENERAL: NAD  HEAD:  Atraumatic, Normocephalic  EYES: EOMI, conjunctiva and sclera clear  CHEST/LUNG: Clear to auscultation bilaterally; No rales, rhonchi, wheezing, or rubs  HEART: Regular rate and rhythm; No murmurs, rubs, or gallops  ABDOMEN: Soft, Nontender, Nondistended;   SKIN: stasis dermatitis  NERVOUS SYSTEM:  Alert & Oriented X3, no focal deficits    LABS:                        11.6   9.09  )-----------( 318      ( 20 Jul 2023 07:25 )             39.0                         12.3   7.79  )-----------( 225      ( 19 Jul 2023 16:48 )             41.5     07-20    140  |  105  |  33<H>  ----------------------------<  138<H>  4.7   |  23  |  1.75<H>  07-19    139  |  105  |  32<H>  ----------------------------<  181<H>  4.9   |  23  |  1.69<H>    Ca    9.2      20 Jul 2023 07:25  Ca    9.3      19 Jul 2023 16:48  Phos  3.7     07-20  Mg     1.8     07-20    TPro  7.0  /  Alb  3.5  /  TBili  0.2  /  DBili  x   /  AST  17  /  ALT  15  /  AlkPhos  104  07-20  TPro  7.2  /  Alb  3.8  /  TBili  0.2  /  DBili  x   /  AST  25  /  ALT  18  /  AlkPhos  118  07-19    CAPILLARY BLOOD GLUCOSE      POCT Blood Glucose.: 124 mg/dL (20 Jul 2023 08:03)  POCT Blood Glucose.: 175 mg/dL (19 Jul 2023 23:42)  POCT Blood Glucose.: 171 mg/dL (19 Jul 2023 17:45)    PT/INR - ( 19 Jul 2023 16:48 )   PT: 11.7 sec;   INR: 1.01 ratio         PTT - ( 19 Jul 2023 16:48 )  PTT:26.4 sec    Urinalysis Basic - ( 20 Jul 2023 07:25 )    Color: x / Appearance: x / SG: x / pH: x  Gluc: 138 mg/dL / Ketone: x  / Bili: x / Urobili: x   Blood: x / Protein: x / Nitrite: x   Leuk Esterase: x / RBC: x / WBC x   Sq Epi: x / Non Sq Epi: x / Bacteria: x          RADIOLOGY & ADDITIONAL TESTS:    Imaging Personally Reviewed:  [x ] YES  [ ] NO    Consultants involved in case:   Consultant(s) Notes Reviewed:  [ x] YES  [ ] NO:   Care Discussed with Consultants/Other Providers [x ] YES  [ ] NO         ***************************************************************  Nayan Neely, PG1  Internal Medicine   pager:  [facility]:  [phone] [facility]: [phone]  ***************************************************************    NURIA GARCIA  83y  MRN: [id number]    Patient is a 83y old  Female who presents with a chief complaint of Post ERCP Abdominal Pain (19 Jul 2023 22:29)    Interval/Overnight Events: no events ON.     Subjective: Pt seen and examined at bedside. Denies fever, CP, SOB, abn pain, N/V, dysuria. Has been NPO, tolerated carb counting diet this morning per nursing.      MEDICATIONS  (STANDING):  apixaban 2.5 milliGRAM(s) Oral two times a day  aspirin  chewable 81 milliGRAM(s) Oral daily  atorvastatin 40 milliGRAM(s) Oral at bedtime  buMETAnide 1 milliGRAM(s) Oral daily  dextrose 5%. 1000 milliLiter(s) (50 mL/Hr) IV Continuous <Continuous>  dextrose 5%. 1000 milliLiter(s) (100 mL/Hr) IV Continuous <Continuous>  dextrose 50% Injectable 25 Gram(s) IV Push once  dextrose 50% Injectable 12.5 Gram(s) IV Push once  dextrose 50% Injectable 25 Gram(s) IV Push once  gabapentin 200 milliGRAM(s) Oral daily  glucagon  Injectable 1 milliGRAM(s) IntraMuscular once  insulin glargine Injectable (LANTUS) 12 Unit(s) SubCutaneous at bedtime  insulin lispro (ADMELOG) corrective regimen sliding scale   SubCutaneous three times a day before meals  insulin lispro (ADMELOG) corrective regimen sliding scale   SubCutaneous at bedtime  metoprolol tartrate 100 milliGRAM(s) Oral every 12 hours  pantoprazole    Tablet 40 milliGRAM(s) Oral before breakfast  spironolactone 12.5 milliGRAM(s) Oral daily    MEDICATIONS  (PRN):  acetaminophen     Tablet .. 650 milliGRAM(s) Oral every 6 hours PRN Temp greater or equal to 38C (100.4F), Mild Pain (1 - 3)  aluminum hydroxide/magnesium hydroxide/simethicone Suspension 30 milliLiter(s) Oral every 4 hours PRN Dyspepsia  dextrose Oral Gel 15 Gram(s) Oral once PRN Blood Glucose LESS THAN 70 milliGRAM(s)/deciliter  melatonin 3 milliGRAM(s) Oral at bedtime PRN Insomnia  ondansetron Injectable 4 milliGRAM(s) IV Push every 8 hours PRN Nausea and/or Vomiting      Objective:    Vitals: Vital Signs Last 24 Hrs  T(C): 36.6 (07-20-23 @ 04:41), Max: 36.7 (07-19-23 @ 17:31)  T(F): 97.9 (07-20-23 @ 04:41), Max: 98.1 (07-19-23 @ 17:31)  HR: 66 (07-20-23 @ 04:41) (60 - 92)  BP: 154/79 (07-20-23 @ 05:04) (121/61 - 189/94)  BP(mean): --  RR: 18 (07-20-23 @ 04:41) (14 - 22)  SpO2: 91% (07-20-23 @ 04:41) (91% - 97%)                I&O's Summary    19 Jul 2023 07:01  -  20 Jul 2023 07:00  --------------------------------------------------------  IN: 360 mL / OUT: 350 mL / NET: 10 mL        PHYSICAL EXAM:  GENERAL: NAD  HEAD:  Atraumatic, Normocephalic  EYES: EOMI, conjunctiva and sclera clear  CHEST/LUNG: Clear to auscultation bilaterally; No rales, rhonchi, wheezing, or rubs  HEART: Regular rate and rhythm; No murmurs, rubs, or gallops  ABDOMEN: Soft, Nontender, Nondistended;   SKIN: stasis dermatitis  NERVOUS SYSTEM:  Alert & Oriented X3, no focal deficits    LABS:                        11.6   9.09  )-----------( 318      ( 20 Jul 2023 07:25 )             39.0                         12.3   7.79  )-----------( 225      ( 19 Jul 2023 16:48 )             41.5     07-20    140  |  105  |  33<H>  ----------------------------<  138<H>  4.7   |  23  |  1.75<H>  07-19    139  |  105  |  32<H>  ----------------------------<  181<H>  4.9   |  23  |  1.69<H>    Ca    9.2      20 Jul 2023 07:25  Ca    9.3      19 Jul 2023 16:48  Phos  3.7     07-20  Mg     1.8     07-20    TPro  7.0  /  Alb  3.5  /  TBili  0.2  /  DBili  x   /  AST  17  /  ALT  15  /  AlkPhos  104  07-20  TPro  7.2  /  Alb  3.8  /  TBili  0.2  /  DBili  x   /  AST  25  /  ALT  18  /  AlkPhos  118  07-19    CAPILLARY BLOOD GLUCOSE      POCT Blood Glucose.: 124 mg/dL (20 Jul 2023 08:03)  POCT Blood Glucose.: 175 mg/dL (19 Jul 2023 23:42)  POCT Blood Glucose.: 171 mg/dL (19 Jul 2023 17:45)    PT/INR - ( 19 Jul 2023 16:48 )   PT: 11.7 sec;   INR: 1.01 ratio         PTT - ( 19 Jul 2023 16:48 )  PTT:26.4 sec    Urinalysis Basic - ( 20 Jul 2023 07:25 )    Color: x / Appearance: x / SG: x / pH: x  Gluc: 138 mg/dL / Ketone: x  / Bili: x / Urobili: x   Blood: x / Protein: x / Nitrite: x   Leuk Esterase: x / RBC: x / WBC x   Sq Epi: x / Non Sq Epi: x / Bacteria: x          RADIOLOGY & ADDITIONAL TESTS:    Imaging Personally Reviewed:  [x ] YES  [ ] NO    Consultants involved in case:   Consultant(s) Notes Reviewed:  [ x] YES  [ ] NO:   Care Discussed with Consultants/Other Providers [x ] YES  [ ] NO

## 2023-07-20 NOTE — DISCHARGE NOTE PROVIDER - PROVIDER TOKENS
PROVIDER:[TOKEN:[[id number]],FOLLOWUP:[2 weeks],ESTABLISHEDPATIENT:[T]] PROVIDER:[TOKEN:[[id number]],FOLLOWUP:[2 weeks],ESTABLISHEDPATIENT:[T]],PROVIDER:[TOKEN:[[id number]],FOLLOWUP:[1 month]]

## 2023-07-20 NOTE — DISCHARGE NOTE PROVIDER - NSDCFUADDAPPT_GEN_ALL_CORE_FT
APPTS ARE READY TO BE MADE: [x ] YES    Best Family or Patient Contact (if needed):    Additional Information about above appointments (if needed):    1: f/u with Dr. Joseph Blanchard within 1-2 months.  Call [phone] for appointment.    2:   3:     Other comments or requests:    APPTS ARE READY TO BE MADE: [x ] YES    Best Family or Patient Contact (if needed):    Additional Information about above appointments (if needed):    1: f/u with Dr. Joseph Blanchard within 1-2 months.  Call [phone] for appointment.    2:   3:     Other comments or requests:     Patient/Caregiver was provided with follow up request details and prefers to call the providers office on their own to schedule.

## 2023-07-20 NOTE — DISCHARGE NOTE PROVIDER - NSDCMRMEDTOKEN_GEN_ALL_CORE_FT
apixaban 2.5 mg oral tablet: 1 tab(s) orally every 12 hours  aspirin 81 mg oral tablet, chewable: 1 tab(s) orally once a day  atorvastatin 40 mg oral tablet: 1 tab(s) orally once a day (at bedtime)  Basaglar KwikPen 100 units/mL subcutaneous solution: 21 unit(s) subcutaneous once a day (at bedtime)  bumetanide 1 mg oral tablet: 1 tab(s) orally once a day  ferrous sulfate 325 mg (65 mg elemental iron) oral tablet: 1 tab(s) orally once a day  gabapentin 100 mg oral capsule: 2 cap(s) orally once a day  metoprolol tartrate 100 mg oral tablet: 1 tab(s) orally every 12 hours  NovoLOG FlexPen 100 units/mL injectable solution: 7 injectable 3 times a day (before meals)  pantoprazole 40 mg oral delayed release tablet: 1 tab(s) orally 2 times a day  spironolactone 25 mg oral tablet: 0.5 tab(s) orally once a day

## 2023-07-20 NOTE — DISCHARGE NOTE PROVIDER - HOSPITAL COURSE
Discharge Summary     Admit Date: 07-19-23  Discharge Date:    Admission diagnoses:     Abdominal Pain, s/p ERCP        Discharge diagnoses:     Abdominal Pain    Hospital Course:   For full details, please see H&P, progress notes, consult notes and ancillary notes. Briefly, NURIA GARCIA is a 83y Female with a history of HTN, HLD, DM2, HFpEF (EF 65%), CAD s/p CABG, Breast CA s/p R partial mastectomy, rectal CA s/p resection with recent admission for acute rissa s/p ERCP w/ stent c/b gallbladder perf w/ perc rissa and recurrent SVT, ?atrial flutter? on eliquis, several prior admissions for ADHF presenting from endoscopy for abdominal pain after ERCP. Patient underwent biliary stent removal, sphincterotomy and stone removal. After procedure, patient noted to have abdominal pain and nausea.  Nausea has since resolved.  On admission, the patient received tylenol and 500 CC IVF @30 cc per hour. Patient now reporting complete resolution of her symptoms and reporting adequate appetite. Denying chest pain, abdominal pain, N/V/Diarrhea, SOB at time of discharge.  Tolerating regular carb-counting diet.      On day of discharge, patient is clinically stable with no new exam findings or acute symptoms compared to prior. The patient was seen by the attending physician on the date of discharge and deemed stable and acceptable for discharge. The patient's chronic medical conditions were treated accordingly per the patient's home medication regimen. The patient's medication reconciliation (with changes made to chronic medications), follow up appointments, discharge orders, instructions, and significant lab and diagnostic studies are as noted.     Discharge follow up action items:     1. Follow up with PCP in 1-2 weeks.   2. Follow up labs, path, & imaging ***  3. Medication changes ***  4. On hold medications ***    Patient's ordered code status: ***    Patient disposition: ***     Discharge Summary     Admit Date: 07-19-23  Discharge Date:    Admission diagnoses:     Abdominal Pain, s/p ERCP        Discharge diagnoses:     Abdominal Pain    Hospital Course:   For full details, please see H&P, progress notes, consult notes and ancillary notes. Briefly, NURIA GARCIA is a 83y Female with a history of HTN, HLD, DM2, HFpEF (EF 65%), CAD s/p CABG, Breast CA s/p R partial mastectomy, rectal CA s/p resection with recent admission for acute rissa s/p ERCP w/ stent c/b gallbladder perf w/ perc rissa and recurrent SVT, ?atrial flutter? on eliquis, several prior admissions for ADHF presenting from endoscopy for abdominal pain after ERCP. Patient underwent biliary stent removal, sphincterotomy and stone removal. After procedure, patient noted to have abdominal pain and nausea.  Nausea has since resolved.  On admission, the patient received tylenol and 500 CC IVF @30 cc per hour. Patient now reporting complete resolution of her symptoms and reporting adequate appetite. Denying chest pain, abdominal pain, N/V/Diarrhea, SOB at time of discharge.  Tolerating regular carb-counting diet.      On day of discharge, patient is clinically stable with no new exam findings or acute symptoms compared to prior. The patient was seen by the attending physician on the date of discharge and deemed stable and acceptable for discharge. The patient's chronic medical conditions were treated accordingly per the patient's home medication regimen. The patient's medication reconciliation (with changes made to chronic medications), follow up appointments, discharge orders, instructions, and significant lab and diagnostic studies are as noted.     Discharge follow up action items:     1. Follow up with PCP in 1-2 weeks.   2. Follow up labs, path, & imaging: N/A  3. Medication changes: None  4. On hold medications: None    Patient's ordered code status: FULL CODE    Patient disposition: Home

## 2023-07-20 NOTE — PROGRESS NOTE ADULT - PROBLEM SELECTOR PLAN 6
CAD s/p CABG on ASA 81 mg    Plan:  >c/w home atorvastatin 40 and ASA 81 CAD s/p CABG on ASA 81 mg    - >c/w home atorvastatin 40 and ASA 81

## 2023-07-20 NOTE — DISCHARGE NOTE PROVIDER - NSDCCPTREATMENT_GEN_ALL_CORE_FT
PRINCIPAL PROCEDURE  Procedure: Abdominal x-ray, AP  Findings and Treatment: FINDINGS:  Residual contrast and stool seen in the large bowel. Air is seen in the   stomach.  Nonobstructive bowel gas pattern.  Liver is enlarged. There is no evidence of intraperitoneal free air on   this single supine radiograph.  Right-sided hip replacement with hardware intact. Chronic degenerative   changes of the lumbosacral spine.      SECONDARY PROCEDURE  Procedure: XR chest AP  Findings and Treatment: FINDINGS:  Status post median sternotomy.  No air under the diaphragm.  Mild pulmonary vascular congestion but otherwise clear lungs.  No pneumothorax. There are trace, bilateral pleural effusions.  There is limited evaluation of the heart size and mediastinum on portable   technique.  No acute abnormality within visible osseous structures.

## 2023-07-20 NOTE — PROGRESS NOTE ADULT - PROBLEM SELECTOR PLAN 5
History of paroxysmal SVT  (discovered in 01/23)  also history of poss atrial flutter  on home eliquis 2.5 BID and metoprolol tartrate 100 BID  Per EP on prior admission in Jan/Feb '23 pt determined to be poor candidate for ablation     Plan:  >c/w metoprolol tartrate with hold parameters  >resume eliquis History of paroxysmal SVT  (discovered in 01/23) and possible atrial flutter.  On home eliquis 2.5 BID and metoprolol tartrate 100 BID.  Per EP on prior admission in Jan/Feb '23 pt determined to be poor candidate for ablation     - c/w metoprolol tartrate with hold parameters  - resume eliquis  - telemetry

## 2023-07-20 NOTE — DISCHARGE NOTE NURSING/CASE MANAGEMENT/SOCIAL WORK - NSDCVIVACCINE_GEN_ALL_CORE_FT
COVID-19, mRNA, LNP-S, PF, 30 mcg/0.3 mL dose (Pfizer); 27-Jul-2021 15:42; Darien Garcia); [employer]; [id number] (Exp. Date: 31-Aug-2021); IntraMuscular; Deltoid Left.; 0.3 milliLiter(s);

## 2023-07-20 NOTE — PROGRESS NOTE ADULT - PROBLEM SELECTOR PLAN 8
Diet: CLD; advance per GI  DVT: SCDs until able to resume eliquis  Dispo: Pending PT eval  Code Status: Prior MOLST however patient would like to wait until daughter at bedside to re-fill out MOLST (Mongolian  used for the entire interview) Diet: Carb counting diet, regular  DVT: SCDs until able to resume eliquis  Dispo: Pending PT eval  Code Status: Prior MOLST however patient would like to wait until daughter at bedside to re-fill out MOLST (Polish  used for the entire interview)

## 2023-07-20 NOTE — DISCHARGE NOTE NURSING/CASE MANAGEMENT/SOCIAL WORK - PATIENT PORTAL LINK FT
You can access the FollowMyHealth Patient Portal offered by [facility] by registering at the following website: [email or web address]. By joining [facility]’s FollowMyHealth portal, you will also be able to view your health information using other applications (apps) compatible with our system.

## 2023-07-20 NOTE — PROGRESS NOTE ADULT - ATTENDING COMMENTS
83F w/ PMHx HTN, HLD, DM2, HFpEF, CAD s/p CABG, Breast CA s/p R partial mastectomy, rectal CA s/p resection with recent admission for acute rissa s/p ERCP w/ stent c/b gallbladder per w/ perc rissa and recurrent SVT, ?flutter on eliquis, several prior admissions for ADHF admitted for possible ERCP Pancreatitis. Tolerating PO this AM, no abdominal pain, lipase 10. Cleared by GI for discharge, feels well.    discharge home with outpt follow up in 1-2 months w/ Dr. Joseph Blanchard    d/w HS 2

## 2023-07-20 NOTE — PROGRESS NOTE ADULT - SUBJECTIVE AND OBJECTIVE BOX
Chief Complaint:  Patient is a 83y old  Female who presents with a chief complaint of Post ERCP Abdominal Pain (20 Jul 2023 09:50)      Interval Events:   Z[place]ian  [id number] was used.  Reports feeling well. Denies any N/V/D/C, abd pain, melena or hematochezia.  Lipase 10.    Hospital Medications:  acetaminophen     Tablet .. 650 milliGRAM(s) Oral every 6 hours PRN  aluminum hydroxide/magnesium hydroxide/simethicone Suspension 30 milliLiter(s) Oral every 4 hours PRN  apixaban 2.5 milliGRAM(s) Oral two times a day  aspirin  chewable 81 milliGRAM(s) Oral daily  atorvastatin 40 milliGRAM(s) Oral at bedtime  buMETAnide 1 milliGRAM(s) Oral daily  dextrose 5%. 1000 milliLiter(s) IV Continuous <Continuous>  dextrose 5%. 1000 milliLiter(s) IV Continuous <Continuous>  dextrose 50% Injectable 25 Gram(s) IV Push once  dextrose 50% Injectable 12.5 Gram(s) IV Push once  dextrose 50% Injectable 25 Gram(s) IV Push once  dextrose Oral Gel 15 Gram(s) Oral once PRN  gabapentin 200 milliGRAM(s) Oral daily  glucagon  Injectable 1 milliGRAM(s) IntraMuscular once  insulin glargine Injectable (LANTUS) 12 Unit(s) SubCutaneous at bedtime  insulin lispro (ADMELOG) corrective regimen sliding scale   SubCutaneous three times a day before meals  insulin lispro (ADMELOG) corrective regimen sliding scale   SubCutaneous at bedtime  melatonin 3 milliGRAM(s) Oral at bedtime PRN  metoprolol tartrate 100 milliGRAM(s) Oral every 12 hours  ondansetron Injectable 4 milliGRAM(s) IV Push every 8 hours PRN  pantoprazole    Tablet 40 milliGRAM(s) Oral before breakfast  spironolactone 12.5 milliGRAM(s) Oral daily      PMHX/PSHX:  DM (diabetes mellitus)    HTN (hypertension)    Hyperlipidemia    Obesity (BMI 35.0-39.9 without comorbidity)    Breast CA, right    Colon cancer    Lymphedema of arm    Cancer    Rectal cancer    Breast CA, right    HTN (hypertension)    Diabetes mellitus    Lymphedema of arm    Hyperlipidemia    Rectal cancer    S/P chemotherapy, time since greater than 12 weeks    S/P radiation > 12 weeks    Obese    Type II diabetes mellitus    CHF (congestive heart failure)    DVT of leg (deep venous thrombosis)    Elevated blood pressure reading in office with diagnosis of hypertension    Renal insufficiency    Gallstone    Severe pulmonary hypertension    H/O mastectomy, right    History of tonsillectomy    S/P colectomy    S/P mastectomy, right    History of appendectomy    S/P ileostomy    S/P colon resection    S/P TKR (total knee replacement), right    S/P hip replacement, right    Herniated lumbar intervertebral disc            ROS: 14 point ROS negative unless otherwise stated in subjective      PHYSICAL EXAM:     GENERAL:  Well developed, no distress  HEENT:  NC/AT,  conjunctivae clear, sclera anicteric  CHEST:  Full & symmetric excursion, no increased effort w/ respirations  HEART:  Regular rhythm & rate  ABDOMEN:  Soft, non-tender, non-distended  EXTREMITIES:  no LE  edema  SKIN:  No rash/erythema/ecchymoses/petechiae/wounds/jaundice  NEURO:  Alert, orientedx3    Vital Signs:  Vital Signs Last 24 Hrs  T(C): 36.6 (20 Jul 2023 04:41), Max: 36.7 (19 Jul 2023 17:31)  T(F): 97.9 (20 Jul 2023 04:41), Max: 98.1 (19 Jul 2023 17:31)  HR: 66 (20 Jul 2023 04:41) (60 - 92)  BP: 154/79 (20 Jul 2023 05:04) (121/61 - 189/94)  BP(mean): --  RR: 18 (20 Jul 2023 04:41) (14 - 22)  SpO2: 91% (20 Jul 2023 04:41) (91% - 97%)    Parameters below as of 20 Jul 2023 04:41  Patient On (Oxygen Delivery Method): room air      Daily     Daily     LABS:                        11.6   9.09  )-----------( 318      ( 20 Jul 2023 07:25 )             39.0     07-20    140  |  105  |  33<H>  ----------------------------<  138<H>  4.7   |  23  |  1.75<H>    Ca    9.2      20 Jul 2023 07:25  Phos  3.7     07-20  Mg     1.8     07-20    TPro  7.0  /  Alb  3.5  /  TBili  0.2  /  DBili  x   /  AST  17  /  ALT  15  /  AlkPhos  104  07-20    LIVER FUNCTIONS - ( 20 Jul 2023 07:25 )  Alb: 3.5 g/dL / Pro: 7.0 g/dL / ALK PHOS: 104 U/L / ALT: 15 U/L / AST: 17 U/L / GGT: x           PT/INR - ( 19 Jul 2023 16:48 )   PT: 11.7 sec;   INR: 1.01 ratio         PTT - ( 19 Jul 2023 16:48 )  PTT:26.4 sec  Urinalysis Basic - ( 20 Jul 2023 07:25 )    Color: x / Appearance: x / SG: x / pH: x  Gluc: 138 mg/dL / Ketone: x  / Bili: x / Urobili: x   Blood: x / Protein: x / Nitrite: x   Leuk Esterase: x / RBC: x / WBC x   Sq Epi: x / Non Sq Epi: x / Bacteria: x      Amylase Serum--      Lipase serum10       Ammonia--      Imaging: No new abdominal imaging

## 2023-07-20 NOTE — DISCHARGE NOTE PROVIDER - CARE PROVIDERS DIRECT ADDRESSES
[email or web address]

## 2023-07-20 NOTE — PHYSICAL THERAPY INITIAL EVALUATION ADULT - PERTINENT HX OF CURRENT PROBLEM, REHAB EVAL
82 YO woman Sami only speaking, with PMH of HTN, HLD, DM2, HFpEF (EF 65%), CAD s/p CABG, Breast CA s/p R partial mastectomy, rectal CA s/p resection with recent admission for acute rissa s/p ERCP w/ stent c/b gallbladder perf w/ perc rissa and recurrent SVT, ?atrial flutter? on eliquis, several prior admissions for ADHF presenting from endoscopy for abdominal pain after ERCP. Patient underwent biliary stent removal, sphincterotomy and stone removal. After procedure, patient noted to have abdominal pain and nausea. CXR:No air under the diaphragm to suggest intraperitoneal free air.  Mild pulmonary vascular congestion but otherwise clear lungs. Xray abdomen:Nonobstructive bowel gas pattern. No free air seen on these frontal views of the abdomen.

## 2023-07-20 NOTE — PROGRESS NOTE ADULT - PROBLEM SELECTOR PLAN 7
History of CKD baseline Cr ~ 1.45-1.6   Currently at baseline    Plan:  >ctm Cr  >renally dose meds   >avoid nephrotoxins History of CKD baseline Cr ~ 1.45-1.6   Currently at baseline    Plan:  - Cr appears near baseline  - renally dose meds   - avoid nephrotoxins

## 2023-07-20 NOTE — DISCHARGE NOTE PROVIDER - CARE PROVIDER_API CALL
Katie Espinoza  Internal Medicine  [address] [address] [place] [address]  Phone: [phone]  Fax: [phone]  Established Patient  Follow Up Time: 2 weeks   Cosmo Espinozaa  Internal Medicine  [address] [place] [address]  Phone: [phone]  Fax: [phone]  Established Patient  Follow Up Time: 2 weeks    Joseph Blanchard  Gastroenterology  Division of Gastroenterology - [address] [place] [address]  Phone: [phone]  Fax: [phone]  Follow Up Time: 1 month

## 2023-07-20 NOTE — PROGRESS NOTE ADULT - PROBLEM SELECTOR PLAN 1
Abdominal pain s/p ERCP   resolved after 500 cc fluids and zofran   Lipase 10   Initial signs and symptoms of pancreatitis however now resolved    Plan:  - advance diet  >Upright chest xray/abdominal xray  >would get CT scan if persistent or worsening abd pain Abdominal pain s/p ERCP .  Resolved after 500 cc fluids and zofran.  Lipase 10.  Initial signs and symptoms of pancreatitis however now resolved.  Low concern for pancreatitis at this time    - advance diet  - Upright chest xray/abdominal xray to r/o abdominal perforation  - would get CT scan if persistent or worsening abd pain  - GI consulted, they believe patient is stable for discharge

## 2023-07-20 NOTE — DISCHARGE NOTE PROVIDER - NSDCFUSCHEDAPPT_GEN_ALL_CORE_FT
Katie Espinoza  [facility] Physician [facility]  INTMED [id number]  Scheduled Appointment: 10/12/2023    Ld Hagan  [facility]  CARDIOLOGY [phone]  Scheduled Appointment: 10/12/2023

## 2023-07-20 NOTE — PHYSICAL THERAPY INITIAL EVALUATION ADULT - ADDITIONAL COMMENTS
pt lives alone in private house, ramp to enter, no stairs indoors. pt independent with all mobility, daughter available to assist with needs. pt owns rolling walker and rollator

## 2023-07-20 NOTE — PROGRESS NOTE ADULT - PROBLEM SELECTOR PLAN 2
History of biliary stone and biliary candidiasis s/p perc rissa, biliary stent   now s/p stent removal, stone removal and sphincterotomy    Plan:  >GI following, appreciate recs History of biliary stone and biliary candidiasis s/p perc rissa, biliary stent   now s/p stent removal, stone removal and sphincterotomy    - GI following, appreciate recs

## 2023-07-31 NOTE — PHYSICAL THERAPY INITIAL EVALUATION ADULT - STANDING BALANCE: DYNAMIC, REHAB EVAL
Discharge Note  Short Stay      SUMMARY     Admit Date: 7/31/2023    Attending Physician: Elvin Shoemaker MD     Discharge Physician: Elvin Shoemaker MD    Discharge Date: 7/31/2023 10:29 AM    Final Diagnosis:   1. Status post bilateral salpingectomy    2. Encounter for female sterilization procedure        Disposition: Home or Self Care    Condition:  Stable    Hospital Course:  Pt was admitted for scheduled surgery.    LSC Bilateral Salpingectomy was performed without complications.  Post-operative course was unremarkable and pt recovered well.  Pt was discharged upon meeting all discharge criteria.  Pt was counseled on post-operative care and warning sign instructions prior to her discharge.    Patient Instructions:   Current Discharge Medication List        START taking these medications    Details   HYDROcodone-acetaminophen (NORCO) 5-325 mg per tablet Take 1 tablet by mouth every 4 (four) hours as needed for Pain.  Qty: 14 tablet, Refills: 0    Comments: Quantity prescribed more than 7 day supply? No  Associated Diagnoses: Status post bilateral salpingectomy      ibuprofen (ADVIL,MOTRIN) 800 MG tablet Take 1 tablet (800 mg total) by mouth every 8 (eight) hours as needed for Pain.  Qty: 30 tablet, Refills: 0    Associated Diagnoses: Status post bilateral salpingectomy      ondansetron (ZOFRAN-ODT) 4 MG TbDL Take 2 tablets (8 mg total) by mouth every 6 (six) hours as needed (nausea).  Qty: 10 tablet, Refills: 0    Associated Diagnoses: Status post bilateral salpingectomy           CONTINUE these medications which have NOT CHANGED    Details   buPROPion (WELLBUTRIN XL) 300 MG 24 hr tablet Take 1 tablet (300 mg total) by mouth once daily.  Qty: 90 tablet, Refills: 1    Associated Diagnoses: Major depressive disorder, recurrent episode, in full remission; Attention deficit hyperactivity disorder (ADHD), unspecified ADHD type      dextroamphetamine-amphetamine (ADDERALL) 10 mg Tab Take 1 tablet (10 mg total) by  mouth once daily.  Qty: 90 tablet, Refills: 0    Associated Diagnoses: Attention deficit hyperactivity disorder (ADHD), unspecified ADHD type      diazePAM (VALIUM) 5 MG tablet Take 1 tablet (5 mg total) by mouth every 8 (eight) hours as needed for Anxiety.  Qty: 2 tablet, Refills: 0    Associated Diagnoses: Anxiety due to invasive procedure      docusate sodium (COLACE) 100 MG capsule Take 100 mg by mouth 2 (two) times daily.      lisdexamfetamine (VYVANSE) 40 MG Cap Take 1 capsule (40 mg total) by mouth once daily.  Qty: 90 capsule, Refills: 0    Associated Diagnoses: Attention deficit hyperactivity disorder (ADHD), unspecified ADHD type      multivitamin (THERAGRAN) per tablet Take 1 tablet by mouth once daily.      sertraline (ZOLOFT) 100 MG tablet Take 1 tablet (100 mg total) by mouth once daily.  Qty: 90 tablet, Refills: 1    Associated Diagnoses: Anxiety      simethicone (MYLICON) 125 MG chewable tablet Take 125 mg by mouth every 6 (six) hours as needed for Flatulence.      valACYclovir (VALTREX) 500 MG tablet TAKE ONE TABLET BY MOUTH EVERY DAY  Qty: 90 tablet, Refills: 3      albuterol (PROVENTIL HFA) 90 mcg/actuation inhaler Inhale 2 puffs into the lungs every 6 (six) hours as needed for Wheezing. Rescue  Qty: 18 g, Refills: 0             Discharge Procedure Orders (must include Diet, Follow-up, Activity)   Discharge Procedure Orders (must include Diet, Follow-up, Activity)   Diet general     Other restrictions (specify):   Order Comments: Light activity and pelvic rest x 2 weeks     Call MD for:  extreme fatigue     Call MD for:  persistent dizziness or light-headedness     Call MD for:  hives     Call MD for:  redness, tenderness, or signs of infection (pain, swelling, redness, odor or green/yellow discharge around incision site)     Call MD for:  difficulty breathing, headache or visual disturbances     Call MD for:  severe uncontrolled pain     Call MD for:  persistent nausea and vomiting     Call MD  for:  temperature >100.4     No dressing needed         Follow-up Information       Elvin Shoemaker MD Follow up in 4 week(s).    Specialty: Obstetrics and Gynecology  Why: Post-operative visit  Contact information:  [address]  [place] [address] [phone]                              good balance

## 2023-08-07 ENCOUNTER — APPOINTMENT (OUTPATIENT)
Dept: CARDIOLOGY | Facility: CLINIC | Age: 83
End: 2023-08-07

## 2023-08-11 ENCOUNTER — RX RENEWAL (OUTPATIENT)
Age: 83
End: 2023-08-11

## 2023-08-11 RX ORDER — CHLORHEXIDINE GLUCONATE 4 %
325 (65 FE) LIQUID (ML) TOPICAL DAILY
Qty: 30 | Refills: 0 | Status: ACTIVE | COMMUNITY
Start: 2023-06-08 | End: 1900-01-01

## 2023-08-11 NOTE — ED ADULT NURSE NOTE - CAS TRG GEN SKIN CONDITION
Laceration between third and fourth finger, no active bleeding. Superficial wound. Patient states she is utd on tetanus. A/0 x3, ambulatory, resps even and unlabored on room air. Warm

## 2023-09-01 NOTE — PROGRESS NOTE ADULT - PROBLEM SELECTOR PROBLEM 4
Pt requesting these meds to be resent to [facility] in [place] due to him being completely out    Type II diabetes mellitus

## 2023-09-14 PROBLEM — I27.20 PULMONARY HYPERTENSION, UNSPECIFIED: Chronic | Status: ACTIVE | Noted: 2023-07-13

## 2023-10-12 ENCOUNTER — APPOINTMENT (OUTPATIENT)
Dept: CARDIOLOGY | Facility: CLINIC | Age: 83
End: 2023-10-12
Payer: MEDICARE

## 2023-10-12 ENCOUNTER — NON-APPOINTMENT (OUTPATIENT)
Age: 83
End: 2023-10-12

## 2023-10-12 ENCOUNTER — APPOINTMENT (OUTPATIENT)
Dept: INTERNAL MEDICINE | Facility: CLINIC | Age: 83
End: 2023-10-12
Payer: MEDICARE

## 2023-10-12 VITALS
HEART RATE: 79 BPM | SYSTOLIC BLOOD PRESSURE: 145 MMHG | OXYGEN SATURATION: 90 % | BODY MASS INDEX: 33.99 KG/M2 | WEIGHT: 180 LBS | RESPIRATION RATE: 16 BRPM | DIASTOLIC BLOOD PRESSURE: 71 MMHG | HEIGHT: 61 IN

## 2023-10-12 VITALS — SYSTOLIC BLOOD PRESSURE: 136 MMHG | DIASTOLIC BLOOD PRESSURE: 71 MMHG

## 2023-10-12 DIAGNOSIS — N18.9 CHRONIC KIDNEY DISEASE, UNSPECIFIED: ICD-10-CM

## 2023-10-12 PROCEDURE — 99214 OFFICE O/P EST MOD 30 MIN: CPT

## 2023-10-12 PROCEDURE — 93000 ELECTROCARDIOGRAM COMPLETE: CPT

## 2023-10-12 PROCEDURE — G0008: CPT

## 2023-10-12 PROCEDURE — 90662 IIV NO PRSV INCREASED AG IM: CPT

## 2023-10-12 PROCEDURE — 99214 OFFICE O/P EST MOD 30 MIN: CPT | Mod: 25

## 2023-10-23 NOTE — PROGRESS NOTE ADULT - PROBLEM SELECTOR PLAN 1
Outpatient Care Management   - [facility] the following patient identified psycho-social needs of pt's spouse needing more help at home to take care of pt. Care plan was created in collaboration with patient/caregiver input.  completed the SDOH questionnaire.     Known Stage II Diastolic CHF (EF 60% 11/20/20) HJR- but Clinically Overloaded  -CXR showing pulmonary edema b/l; BNP 2810, EKG grossly normal, low voltage   -starting bumex 1mg PO BID, c/w metoprolol 150mg qd  -s/p aggressive IV diuresis; euvolemic on exam  -Daily weights, fluid 1.5L restriction, salt restriction  -Echo 3/11 EF 50%, w/severe pulmHTN, but likely 2/2 severe volume overload  -Repeat echo 3/14 w/moderate pulmHTN

## 2023-10-27 LAB
ALBUMIN SERPL ELPH-MCNC: 4.1 G/DL
ALP BLD-CCNC: 113 U/L
ALT SERPL-CCNC: 8 U/L
ANION GAP SERPL CALC-SCNC: 14 MMOL/L
AST SERPL-CCNC: 13 U/L
BASOPHILS # BLD AUTO: 0.04 K/UL
BASOPHILS NFR BLD AUTO: 0.5 %
BILIRUB SERPL-MCNC: 0.3 MG/DL
BUN SERPL-MCNC: 29 MG/DL
CALCIUM SERPL-MCNC: 9.8 MG/DL
CHLORIDE SERPL-SCNC: 99 MMOL/L
CHOLEST SERPL-MCNC: 137 MG/DL
CO2 SERPL-SCNC: 27 MMOL/L
CREAT SERPL-MCNC: 1.71 MG/DL
EGFR: 29 ML/MIN/1.73M2
EOSINOPHIL # BLD AUTO: 0.07 K/UL
EOSINOPHIL NFR BLD AUTO: 0.8 %
FERRITIN SERPL-MCNC: 62 NG/ML
GLUCOSE SERPL-MCNC: 135 MG/DL
HCT VFR BLD CALC: 45.3 %
HDLC SERPL-MCNC: 52 MG/DL
HGB BLD-MCNC: 13.2 G/DL
IMM GRANULOCYTES NFR BLD AUTO: 0.2 %
IRON SATN MFR SERPL: 25 %
IRON SERPL-MCNC: 81 UG/DL
LDLC SERPL CALC-MCNC: 64 MG/DL
LYMPHOCYTES # BLD AUTO: 1.8 K/UL
LYMPHOCYTES NFR BLD AUTO: 21.4 %
MAN DIFF?: NORMAL
MCHC RBC-ENTMCNC: 29.1 GM/DL
MCHC RBC-ENTMCNC: 29.3 PG
MCV RBC AUTO: 100.7 FL
MONOCYTES # BLD AUTO: 0.55 K/UL
MONOCYTES NFR BLD AUTO: 6.5 %
NEUTROPHILS # BLD AUTO: 5.92 K/UL
NEUTROPHILS NFR BLD AUTO: 70.6 %
NONHDLC SERPL-MCNC: 85 MG/DL
PLATELET # BLD AUTO: 328 K/UL
POTASSIUM SERPL-SCNC: 4.6 MMOL/L
PROT SERPL-MCNC: 7.2 G/DL
RBC # BLD: 4.5 M/UL
RBC # FLD: 16.1 %
SODIUM SERPL-SCNC: 141 MMOL/L
TIBC SERPL-MCNC: 318 UG/DL
TRIGL SERPL-MCNC: 118 MG/DL
TSH SERPL-ACNC: 1.45 UIU/ML
UIBC SERPL-MCNC: 237 UG/DL
VIT B12 SERPL-MCNC: 1220 PG/ML
WBC # FLD AUTO: 8.4 K/UL

## 2023-10-31 LAB
ESTIMATED AVERAGE GLUCOSE: 146 MG/DL
HBA1C MFR BLD HPLC: 6.7 %

## 2023-11-01 NOTE — OCCUPATIONAL THERAPY INITIAL EVALUATION ADULT - TRANSFER SAFETY CONCERNS NOTED: TOILET, REHAB EVAL
MEDICATIONS  (STANDING):  aspirin enteric coated 81 milliGRAM(s) Oral daily  atorvastatin 40 milliGRAM(s) Oral at bedtime  enoxaparin Injectable 40 milliGRAM(s) SubCutaneous every 24 hours  influenza  Vaccine (HIGH DOSE) 0.7 milliLiter(s) IntraMuscular once  lactobacillus acidophilus 1 Tablet(s) Oral two times a day with meals  metoprolol succinate ER 25 milliGRAM(s) Oral daily  pantoprazole    Tablet 40 milliGRAM(s) Oral before breakfast  sodium chloride 0.9%. 1000 milliLiter(s) (100 mL/Hr) IV Continuous <Continuous>    MEDICATIONS  (PRN):  acetaminophen     Tablet .. 650 milliGRAM(s) Oral every 6 hours PRN Temp greater or equal to 38C (100.4F), Mild Pain (1 - 3)  aluminum hydroxide/magnesium hydroxide/simethicone Suspension 30 milliLiter(s) Oral every 4 hours PRN Dyspepsia  melatonin 3 milliGRAM(s) Oral at bedtime PRN Insomnia  ondansetron Injectable 4 milliGRAM(s) IV Push every 8 hours PRN Nausea and/or Vomiting  
decreased balance during turns

## 2023-11-15 ENCOUNTER — RX RENEWAL (OUTPATIENT)
Age: 83
End: 2023-11-15

## 2023-11-30 ENCOUNTER — APPOINTMENT (OUTPATIENT)
Dept: GASTROENTEROLOGY | Facility: CLINIC | Age: 83
End: 2023-11-30

## 2024-01-09 RX ORDER — FAMOTIDINE 20 MG/1
20 TABLET, FILM COATED ORAL
Qty: 90 | Refills: 0 | Status: ACTIVE | COMMUNITY
Start: 2021-01-12 | End: 1900-01-01

## 2024-01-10 ENCOUNTER — NON-APPOINTMENT (OUTPATIENT)
Age: 84
End: 2024-01-10

## 2024-01-10 ENCOUNTER — APPOINTMENT (OUTPATIENT)
Dept: CARDIOLOGY | Facility: CLINIC | Age: 84
End: 2024-01-10
Payer: MEDICARE

## 2024-01-10 VITALS
HEIGHT: 61 IN | SYSTOLIC BLOOD PRESSURE: 133 MMHG | RESPIRATION RATE: 15 BRPM | HEART RATE: 83 BPM | WEIGHT: 177 LBS | BODY MASS INDEX: 33.42 KG/M2 | DIASTOLIC BLOOD PRESSURE: 82 MMHG | OXYGEN SATURATION: 95 %

## 2024-01-10 VITALS — HEART RATE: 84 BPM | OXYGEN SATURATION: 99 %

## 2024-01-10 PROCEDURE — 99214 OFFICE O/P EST MOD 30 MIN: CPT | Mod: 25

## 2024-01-10 PROCEDURE — 93000 ELECTROCARDIOGRAM COMPLETE: CPT

## 2024-01-10 NOTE — PHYSICAL EXAM
[General Appearance - Well Developed] : well developed [Normal Appearance] : normal appearance [Well Groomed] : well groomed [General Appearance - Well Nourished] : well nourished [No Deformities] : no deformities [General Appearance - In No Acute Distress] : no acute distress [Normal Conjunctiva] : the conjunctiva exhibited no abnormalities [Normal Oral Mucosa] : normal oral mucosa [No Oral Pallor] : no oral pallor [Respiration, Rhythm And Depth] : normal respiratory rhythm and effort [Exaggerated Use Of Accessory Muscles For Inspiration] : no accessory muscle use [Auscultation Breath Sounds / Voice Sounds] : lungs were clear to auscultation bilaterally [Normal Rate] : normal [Rhythm Regular] : regular [Normal S1] : normal S1 [Normal S2] : normal S2 [II] : a grade 2 [Bowel Sounds] : normal bowel sounds [Abdomen Soft] : soft [Abdomen Tenderness] : non-tender [Nail Clubbing] : no clubbing of the fingernails [Cyanosis, Localized] : no localized cyanosis [Petechial Hemorrhages (___cm)] : no petechial hemorrhages [Skin Color & Pigmentation] : normal skin color and pigmentation [] : no rash [No Skin Ulcers] : no skin ulcer [Oriented To Time, Place, And Person] : oriented to person, place, and time [Affect] : the affect was normal [Mood] : the mood was normal [No Anxiety] : not feeling anxious [Right Carotid Bruit] : no bruit heard over the right carotid [Left Carotid Bruit] : no bruit heard over the left carotid [Bruit] : no bruit heard [No Pitting Edema] : no pitting edema present [FreeTextEntry1] : Her gait is slow secondary to joint pain.

## 2024-01-10 NOTE — CARDIOLOGY SUMMARY
[___] : [unfilled] [de-identified] : 1/10/2024: Sinus Rhythm at 84 bpm with an old anterior infarct/ [de-identified] : 5/6/2023: The left ventricular systolic function is normal with an ejection fraction of 62%. There are no regional wall motion abnormalities seen. Mild LVH. Normal right ventricular cavity size and normal systolic function. Moderate pulmonary hypertension. PASP= 56 mmHg. The right atrium is moderately dilated. Mild mitral regurgitation. Mild-moderate tricuspid regurgitation.     9/9/2022: EF (Maldonado Rule): 62 %. Mild mitral regurgitation. Mildly dilated left atrium. Left ventricle suboptimally visualized despite intravenous ultrasonic enhancing agent; the apex is foreshortened on apical images. Overall normal left ventricular systolic function. Grossly normal right ventricular size and systolic function. Mild tricuspid regurgitation. PASP= 35 mmHg, consistent with borderline pulmonary hypertension.

## 2024-01-10 NOTE — DISCUSSION/SUMMARY
[FreeTextEntry1] : IMPRESSION: Ms. Batista is an 83 year old woman with a history of HTN, hyperlipidemia, type 2 diabetes mellitus, breast cancer status post right partial mastectomy, rectal carcinoma status post resection, diastolic heart failure, anemia, hypokalemia, CAD s/p 2-vessel CABG 11/9/2020, and renal insufficiency who presents today for follow up of HTN and heart failure and risk stratification prior to dental extraction.  PLAN: 1. She is s/p CABG 11/2020 and has not experienced any chest pain since her surgery.  She will continue on Aspirin 81 mg daily. Her ECG that was performed today revealed sinus rhythm without APCs and an old anterior infarct (which was unchanged from prior). There was no ectopy on exam. Her daughter states that her weight have been relatively unchanged at home. She will continue on Bumex 1 mg daily and Aldactone 12.5 mg daily. We will need to follow her potassium and creatinine closely on her diuretic regimen.  She will also continue on a low sodium diet and restrict her fluid intake. Her daughter will continue to follow her weights at home. She can take an additional 1 mg of Bumex if her weight goes up by more than 2 pounds.  There were no signs of decompensated heart failure on exam.  2. She will continue on Eliquis 2.5 mg twice daily for her DVT and she will continue to follow up with Vascular Cardiology.  3. Her blood pressure is fine. She will continue on Metoprolol  mg twice daily, in addition to her diuretic regimen. 4. Her most recent LDL was very good. She will continue on Lipitor 40 mg daily along with a low cholesterol diet.  5. She will follow up with me in 2 months or sooner if she is symptomatic. Her daughter will notify me should her weight go up. 6. She has intermediate clinical risk predictors for a low risk procedure (dental extraction). She may hold her Eliquis starting tonight and resume on Friday evening. She may proceed with her dental extraction without any further cardiac workup.  [EKG obtained to assist in diagnosis and management of assessed problem(s)] : EKG obtained to assist in diagnosis and management of assessed problem(s)

## 2024-01-11 NOTE — PROGRESS NOTE ADULT - PROBLEM SELECTOR PROBLEM 1
Acute decompensated heart failure
exercising and is adherent to low salt diet.  Blood pressure is well controlled at home. Cardiac symptoms none. Patient denies chest pain, chest pressure/discomfort, claudication, dyspnea, exertional chest pressure/discomfort, fatigue, irregular heart beat, lower extremity edema, near-syncope, orthopnea, palpitations, paroxysmal nocturnal dyspnea, syncope, and tachypnea.  Cardiovascular risk factors: hypertension. Use of agents associated with hypertension: oral contraceptives. History of target organ damage: none.     She complains of a chronic runny nose only in the mornings and when she works out it will run the whole time for 30 minutes. She can also get it when she is cleaning and running the vacuum or emptying out the dust arnold.       She complains of right sciatic pain intermittently when she walks too much like at the grocery store. But then resolves when she stops has happened in the past. On exam noted to have somatic dysfunction.         Current Outpatient Medications:     labetalol (NORMODYNE) 100 MG tablet, Take 1 tablet by mouth 2 times daily, Disp: 60 tablet, Rfl: 6    drospirenone-ethinyl estradiol (COREY) 3-0.02 MG per tablet, TAKE ONE TABLET BY MOUTH DAILY, Disp: 28 tablet, Rfl: 2    ISOtretinoin (ACCUTANE) 30 MG chemo capsule, , Disp: , Rfl:         Surgical History:  has no past surgical history on file.  Social History:  reports that she has never smoked. She has never been exposed to tobacco smoke. She has never used smokeless tobacco. She reports current alcohol use. She reports that she does not use drugs.  Family History: family history includes Bipolar Disorder in her maternal grandmother; Diabetes in her maternal grandfather and paternal grandfather; Hypertension in her father and mother; No Known Problems in her brother, brother, sister, and sister; Stroke in her maternal grandfather, paternal grandfather, and paternal grandmother.  I have reviewed Jeni's allergies, medications, problem 
Acute decompensated heart failure

## 2024-02-19 NOTE — ED ADULT NURSE NOTE - IN THE PAST 12 MONTHS HAVE YOU USED DRUGS OTHER THAN THOSE REQUIRED FOR MEDICAL REASON?
Patient called the RX Refill Line. Message is being forwarded to the office.     Patient is requesting - Pharmacy and requested for a different Dx code for Accu-chek lancets and test strips.in order for medicare to cover. Please review. Thank you.    Please contact patient at [facility] pharmacy [phone]       No

## 2024-02-23 NOTE — PHYSICAL THERAPY INITIAL EVALUATION ADULT - PLANNED THERAPY INTERVENTIONS, PT EVAL
balance training/bed mobility training/gait training/transfer training who will be available to assist post-op/spouse

## 2024-03-05 ENCOUNTER — APPOINTMENT (OUTPATIENT)
Dept: INTERNAL MEDICINE | Facility: CLINIC | Age: 84
End: 2024-03-05
Payer: MEDICARE

## 2024-03-05 ENCOUNTER — LABORATORY RESULT (OUTPATIENT)
Age: 84
End: 2024-03-05

## 2024-03-05 ENCOUNTER — NON-APPOINTMENT (OUTPATIENT)
Age: 84
End: 2024-03-05

## 2024-03-05 ENCOUNTER — APPOINTMENT (OUTPATIENT)
Dept: CARDIOLOGY | Facility: CLINIC | Age: 84
End: 2024-03-05
Payer: MEDICARE

## 2024-03-05 VITALS — SYSTOLIC BLOOD PRESSURE: 130 MMHG | DIASTOLIC BLOOD PRESSURE: 80 MMHG

## 2024-03-05 VITALS — DIASTOLIC BLOOD PRESSURE: 80 MMHG | SYSTOLIC BLOOD PRESSURE: 130 MMHG

## 2024-03-05 VITALS
OXYGEN SATURATION: 95 % | TEMPERATURE: 97.8 F | HEIGHT: 61 IN | HEART RATE: 83 BPM | WEIGHT: 181 LBS | BODY MASS INDEX: 34.17 KG/M2 | RESPIRATION RATE: 12 BRPM

## 2024-03-05 DIAGNOSIS — I82.491 ACUTE EMBOLISM AND THROMBOSIS OF OTHER SPECIFIED DEEP VEIN OF RIGHT LOWER EXTREMITY: ICD-10-CM

## 2024-03-05 DIAGNOSIS — H92.09 OTALGIA, UNSPECIFIED EAR: ICD-10-CM

## 2024-03-05 DIAGNOSIS — I50.9 HEART FAILURE, UNSPECIFIED: ICD-10-CM

## 2024-03-05 PROCEDURE — G2211 COMPLEX E/M VISIT ADD ON: CPT

## 2024-03-05 PROCEDURE — 93000 ELECTROCARDIOGRAM COMPLETE: CPT

## 2024-03-05 PROCEDURE — 99214 OFFICE O/P EST MOD 30 MIN: CPT

## 2024-03-05 RX ORDER — PANTOPRAZOLE 40 MG/1
40 TABLET, DELAYED RELEASE ORAL DAILY
Qty: 90 | Refills: 1 | Status: ACTIVE | COMMUNITY
Start: 2023-06-08 | End: 1900-01-01

## 2024-03-05 RX ORDER — SPIRONOLACTONE 25 MG/1
25 TABLET ORAL
Qty: 45 | Refills: 1 | Status: ACTIVE | COMMUNITY
Start: 1900-01-01 | End: 1900-01-01

## 2024-03-05 RX ORDER — BUMETANIDE 1 MG/1
1 TABLET ORAL TWICE DAILY
Qty: 180 | Refills: 1 | Status: ACTIVE | COMMUNITY
Start: 2021-07-07 | End: 1900-01-01

## 2024-03-05 RX ORDER — INSULIN GLARGINE 100 [IU]/ML
100 INJECTION, SOLUTION SUBCUTANEOUS
Qty: 1 | Refills: 1 | Status: DISCONTINUED | COMMUNITY
End: 2024-03-05

## 2024-03-06 NOTE — PHYSICAL EXAM
[General Appearance - Well Developed] : well developed [Normal Appearance] : normal appearance [Well Groomed] : well groomed [General Appearance - In No Acute Distress] : no acute distress [No Deformities] : no deformities [General Appearance - Well Nourished] : well nourished [Normal Conjunctiva] : the conjunctiva exhibited no abnormalities [Normal Oral Mucosa] : normal oral mucosa [No Oral Pallor] : no oral pallor [Respiration, Rhythm And Depth] : normal respiratory rhythm and effort [Exaggerated Use Of Accessory Muscles For Inspiration] : no accessory muscle use [Auscultation Breath Sounds / Voice Sounds] : lungs were clear to auscultation bilaterally [Rhythm Regular] : regular [Normal Rate] : normal [Normal S1] : normal S1 [Normal S2] : normal S2 [II] : a grade 2 [No Pitting Edema] : no pitting edema present [Bowel Sounds] : normal bowel sounds [Abdomen Tenderness] : non-tender [Abdomen Soft] : soft [Cyanosis, Localized] : no localized cyanosis [Nail Clubbing] : no clubbing of the fingernails [Skin Color & Pigmentation] : normal skin color and pigmentation [Petechial Hemorrhages (___cm)] : no petechial hemorrhages [No Skin Ulcers] : no skin ulcer [] : no rash [Affect] : the affect was normal [Oriented To Time, Place, And Person] : oriented to person, place, and time [No Anxiety] : not feeling anxious [Mood] : the mood was normal [Right Carotid Bruit] : no bruit heard over the right carotid [Left Carotid Bruit] : no bruit heard over the left carotid [Bruit] : no bruit heard [FreeTextEntry1] : Her gait is slow secondary to joint pain.

## 2024-03-06 NOTE — HISTORY OF PRESENT ILLNESS
[FreeTextEntry1] : Patient is an 84 year old woman with a history of HTN, hyperlipidemia, type 2 diabetes mellitus, breast cancer status post right partial mastectomy, rectal carcinoma status post resection, anemia, hypokalemia, diastolic heart failure, right lower extremity DVT, CAD s/p 2 vessel CABG 11/9/2020 and renal insufficiency who presents today for follow up of HTN and heart failure. She states that she experiences dyspnea with exertion that is improved. Last January 2023 she was hospitalized with acute cholecystitis that was complicated by atrial fibrillation. She was subsequently admitted in March and again in April 2023 with CHF exacerbation. She is currently taking Bumex 1 mg once a day and Aldactone 12.5 mg daily. She has not noticed any significant lower extremity edema. She otherwise denies any chest pain, palpitations, headaches, dyspnea at rest, or dizziness. When she walks she feels a little winded. Her right upper extremity swelling due to chronic lymphedema is unchanged.

## 2024-03-06 NOTE — CARDIOLOGY SUMMARY
[___] : [unfilled] [de-identified] : 3/5/2024: Sinus Rhythm at 86 beats per minute with an old anteroseptal infarct, and low voltage QRS.  [de-identified] : 5/6/2023: The left ventricular systolic function is normal with an ejection fraction of 62%. There are no regional wall motion abnormalities seen. Mild LVH. Normal right ventricular cavity size and normal systolic function. Moderate pulmonary hypertension. PASP= 56 mmHg. The right atrium is moderately dilated. Mild mitral regurgitation. Mild-moderate tricuspid regurgitation.     9/9/2022: EF (Maldonado Rule): 62 %. Mild mitral regurgitation. Mildly dilated left atrium. Left ventricle suboptimally visualized despite intravenous ultrasonic enhancing agent; the apex is foreshortened on apical images. Overall normal left ventricular systolic function. Grossly normal right ventricular size and systolic function. Mild tricuspid regurgitation. PASP= 35 mmHg, consistent with borderline pulmonary hypertension.

## 2024-03-06 NOTE — DISCUSSION/SUMMARY
[EKG obtained to assist in diagnosis and management of assessed problem(s)] : EKG obtained to assist in diagnosis and management of assessed problem(s) [FreeTextEntry1] : IMPRESSION: Ms. Batista is an 84 year old woman with a history of HTN, hyperlipidemia, type 2 diabetes mellitus, breast cancer status post right partial mastectomy, rectal carcinoma status post resection, diastolic heart failure, anemia, hypokalemia, CAD s/p 2-vessel CABG 11/9/2020, and renal insufficiency who presents today for follow up of HTN and heart failure.  PLAN: 1. She is s/p CABG 11/2020 and has not experienced any chest pain since her surgery.  She will continue on Aspirin 81 mg daily. Her ECG that was performed today revealed sinus rhythm without APCs and an old anteroseptal infarct (which was unchanged from prior). There was no ectopy on exam. Her daughter states that her weight has been relatively unchanged at home. She will continue on Bumex 1 mg daily and Aldactone 12.5 mg daily. We will need to follow her potassium and creatinine closely on her diuretic regimen.  She will also continue on a low sodium diet and restrict her fluid intake. Her daughter will continue to follow her weights at home. She can take an additional 1 mg of Bumex if her weight goes up by more than 2 pounds.  There were no signs of decompensated heart failure on exam.  2. She will continue on Eliquis 2.5 mg twice daily for her DVT and she will continue to follow up with Vascular Cardiology.  3. Her blood pressure is fine. She will continue on Metoprolol  mg twice daily, in addition to her diuretic regimen. 4. Her most recent LDL was very good. She will continue on Lipitor 40 mg daily along with a low cholesterol diet.  5. She will follow up with me in 4 months or sooner if she is symptomatic. Her daughter will notify me should her weight go up. 6. She will schedule an echocardiogram at the time of her next visit to follow up her pulmonary HTN and to follow her LV ejection fraction.

## 2024-03-11 PROBLEM — I82.491 ACUTE DEEP VEIN THROMBOSIS (DVT) OF OTHER SPECIFIED VEIN OF RIGHT LOWER EXTREMITY: Status: ACTIVE | Noted: 2019-01-22

## 2024-03-11 NOTE — PHYSICAL EXAM
[No Acute Distress] : no acute distress [Normal Sclera/Conjunctiva] : normal sclera/conjunctiva [PERRL] : pupils equal round and reactive to light [EOMI] : extraocular movements intact [Normal Outer Ear/Nose] : the outer ears and nose were normal in appearance [No JVD] : no jugular venous distention [Normal Oropharynx] : the oropharynx was normal [No Lymphadenopathy] : no lymphadenopathy [Supple] : supple [No Respiratory Distress] : no respiratory distress  [Thyroid Normal, No Nodules] : the thyroid was normal and there were no nodules present [No Accessory Muscle Use] : no accessory muscle use [Normal Rate] : normal rate  [Clear to Auscultation] : lungs were clear to auscultation bilaterally [Regular Rhythm] : with a regular rhythm [Normal S1, S2] : normal S1 and S2 [No Murmur] : no murmur heard [Pedal Pulses Present] : the pedal pulses are present [No Edema] : there was no peripheral edema [No Palpable Aorta] : no palpable aorta [No Extremity Clubbing/Cyanosis] : no extremity clubbing/cyanosis [Soft] : abdomen soft [Non Tender] : non-tender [Non-distended] : non-distended [Normal Bowel Sounds] : normal bowel sounds [No CVA Tenderness] : no CVA  tenderness [No Spinal Tenderness] : no spinal tenderness [Grossly Normal Strength/Tone] : grossly normal strength/tone [No Joint Swelling] : no joint swelling [No Rash] : no rash [Coordination Grossly Intact] : coordination grossly intact [No Focal Deficits] : no focal deficits [Normal Affect] : the affect was normal [Normal Insight/Judgement] : insight and judgment were intact [Normal TMs] : both tympanic membranes were normal [de-identified] :  + varicose/spider  vein, Left arm lymphedema , Dependent edema  [de-identified] : ambulates with walker  [de-identified] : + LE hyperpigmentation

## 2024-03-11 NOTE — PLAN
[FreeTextEntry1] : Follow-up  see plan  B/l earache ENT referral  HTN/HLD/HF/DVT history  Follows with Dr. Hagan- Has appt today  cont Amlodipine 5 mg daily cont Metoprolol 150 mg daily cont Bumetanide 1 mg daily cont Spironolactone 25 mg 1/2 tab daily cont Atorvastatin 40 mg daily Cont ASA 81 mg daily cont Eliquis 2.5 mg Twice daily   Diabetes- will monitor A1C Low carb, low sugar diet/ increased physical activity NovoLog 7 units TID with meals  Lantus 21 units qhs   gabapentin 100 mg    Bloodwork ordered Pt instructed to f/u in one week for lab results

## 2024-03-11 NOTE — REVIEW OF SYSTEMS
[Negative] : Psychiatric [Sore Throat] : no sore throat [Nasal Discharge] : no nasal discharge [Earache] : earache

## 2024-03-11 NOTE — HISTORY OF PRESENT ILLNESS
[de-identified] : Fernanda Batista is an 84 year old female accompanied by her daughter with a history of HTN, hyperlipidemia, type 2 diabetes mellitus, breast cancer status post right partial mastectomy, rectal carcinoma status post resection, anemia, hypokalemia, diastolic heart failure, right lower extremity DVT, CAD s/p 2 vessel CABG 11/9/2020 and renal insufficiency/acute cholecystitis status post ERCP with stent placement/stent removal/sphincterectomy and stone removal who presents today for follow up  Pt c/o of clogged b/l ears for a week. The sensation is described as 'water retention' in her ears. Sxs are accompanied by pain in the back of her neck and under b/l ears. Denies sore throat. Has not been following with an ENT.  Denies any F/C, N/V or abdominal pain  As per daughter patient doing well. She has good glycemic control at home. DM controlled on Lantus 21 U QHS and NovoLog 7 units TID. She is eating well. She denies any chest pain, shortness of breath, palpitations  She is otherwise fine and offers no additional complaints.

## 2024-03-11 NOTE — END OF VISIT
[FreeTextEntry3] :    Documented by Diogo Hilton acting as a scribe under Dr. Espinoza. 03/05/2024

## 2024-03-15 DIAGNOSIS — N39.0 URINARY TRACT INFECTION, SITE NOT SPECIFIED: ICD-10-CM

## 2024-03-15 LAB
ALBUMIN SERPL ELPH-MCNC: 4.1 G/DL
ALP BLD-CCNC: 104 U/L
ALT SERPL-CCNC: 12 U/L
ANION GAP SERPL CALC-SCNC: 17 MMOL/L
APPEARANCE: CLEAR
AST SERPL-CCNC: 12 U/L
BASOPHILS # BLD AUTO: 0.04 K/UL
BASOPHILS NFR BLD AUTO: 0.5 %
BILIRUB SERPL-MCNC: 0.3 MG/DL
BILIRUBIN URINE: NEGATIVE
BLOOD URINE: ABNORMAL
BUN SERPL-MCNC: 30 MG/DL
CALCIUM SERPL-MCNC: 9.5 MG/DL
CHLORIDE SERPL-SCNC: 102 MMOL/L
CHOLEST SERPL-MCNC: 144 MG/DL
CO2 SERPL-SCNC: 25 MMOL/L
COLOR: YELLOW
CREAT SERPL-MCNC: 1.87 MG/DL
EGFR: 26 ML/MIN/1.73M2
EOSINOPHIL # BLD AUTO: 0.06 K/UL
EOSINOPHIL NFR BLD AUTO: 0.8 %
ESTIMATED AVERAGE GLUCOSE: 148 MG/DL
GLUCOSE QUALITATIVE U: NEGATIVE MG/DL
GLUCOSE SERPL-MCNC: 128 MG/DL
HBA1C MFR BLD HPLC: 6.8 %
HCT VFR BLD CALC: 44.2 %
HDLC SERPL-MCNC: 51 MG/DL
HGB BLD-MCNC: 13.3 G/DL
IMM GRANULOCYTES NFR BLD AUTO: 0.3 %
KETONES URINE: NEGATIVE MG/DL
LDLC SERPL CALC-MCNC: 73 MG/DL
LEUKOCYTE ESTERASE URINE: ABNORMAL
LYMPHOCYTES # BLD AUTO: 1.83 K/UL
LYMPHOCYTES NFR BLD AUTO: 22.9 %
MAN DIFF?: NORMAL
MCHC RBC-ENTMCNC: 29.4 PG
MCHC RBC-ENTMCNC: 30.1 GM/DL
MCV RBC AUTO: 97.6 FL
MONOCYTES # BLD AUTO: 0.43 K/UL
MONOCYTES NFR BLD AUTO: 5.4 %
NEUTROPHILS # BLD AUTO: 5.62 K/UL
NEUTROPHILS NFR BLD AUTO: 70.1 %
NITRITE URINE: POSITIVE
NONHDLC SERPL-MCNC: 93 MG/DL
PH URINE: 6
PLATELET # BLD AUTO: 335 K/UL
POTASSIUM SERPL-SCNC: 5.1 MMOL/L
PROT SERPL-MCNC: 7.6 G/DL
PROTEIN URINE: 30 MG/DL
RBC # BLD: 4.53 M/UL
RBC # FLD: 15.5 %
SODIUM SERPL-SCNC: 144 MMOL/L
SPECIFIC GRAVITY URINE: 1.02
TRIGL SERPL-MCNC: 107 MG/DL
TSH SERPL-ACNC: 1.91 UIU/ML
UROBILINOGEN URINE: 0.2 MG/DL
VIT B12 SERPL-MCNC: 1008 PG/ML
WBC # FLD AUTO: 8 K/UL

## 2024-03-15 RX ORDER — SULFAMETHOXAZOLE AND TRIMETHOPRIM 400; 80 MG/1; MG/1
400-80 TABLET ORAL TWICE DAILY
Qty: 10 | Refills: 0 | Status: ACTIVE | COMMUNITY
Start: 2024-03-15 | End: 1900-01-01

## 2024-03-19 RX ORDER — PEN NEEDLE, DIABETIC 29 G X1/2"
32G X 4 MM NEEDLE, DISPOSABLE MISCELLANEOUS
Qty: 100 | Refills: 5 | Status: ACTIVE | COMMUNITY
Start: 2023-06-01 | End: 1900-01-01

## 2024-04-05 ENCOUNTER — LABORATORY RESULT (OUTPATIENT)
Age: 84
End: 2024-04-05

## 2024-04-11 LAB
APPEARANCE: ABNORMAL
BACTERIA UR CULT: ABNORMAL
BILIRUBIN URINE: NEGATIVE
BLOOD URINE: ABNORMAL
COLOR: YELLOW
GLUCOSE QUALITATIVE U: NEGATIVE MG/DL
KETONES URINE: NEGATIVE MG/DL
LEUKOCYTE ESTERASE URINE: ABNORMAL
NITRITE URINE: NEGATIVE
PH URINE: 7
PROTEIN URINE: 30 MG/DL
SPECIFIC GRAVITY URINE: 1.02
UROBILINOGEN URINE: 0.2 MG/DL

## 2024-04-11 RX ORDER — CIPROFLOXACIN HYDROCHLORIDE 250 MG/1
250 TABLET, FILM COATED ORAL
Qty: 10 | Refills: 0 | Status: ACTIVE | COMMUNITY
Start: 2024-04-11 | End: 1900-01-01

## 2024-04-26 ENCOUNTER — INPATIENT (INPATIENT)
Facility: HOSPITAL | Age: 84
LOS: 2 days | Discharge: HOME HEALTH | DRG: 638 | End: 2024-04-29
Attending: HOSPITALIST | Admitting: STUDENT IN AN ORGANIZED HEALTH CARE EDUCATION/TRAINING PROGRAM
Payer: MEDICARE

## 2024-04-26 VITALS
SYSTOLIC BLOOD PRESSURE: 130 MMHG | RESPIRATION RATE: 18 BRPM | HEART RATE: 92 BPM | OXYGEN SATURATION: 97 % | DIASTOLIC BLOOD PRESSURE: 70 MMHG | TEMPERATURE: 98 F

## 2024-04-26 DIAGNOSIS — Z90.11 ACQUIRED ABSENCE OF RIGHT BREAST AND NIPPLE: Chronic | ICD-10-CM

## 2024-04-26 DIAGNOSIS — Z96.651 PRESENCE OF RIGHT ARTIFICIAL KNEE JOINT: Chronic | ICD-10-CM

## 2024-04-26 DIAGNOSIS — Z96.641 PRESENCE OF RIGHT ARTIFICIAL HIP JOINT: Chronic | ICD-10-CM

## 2024-04-26 DIAGNOSIS — Z93.2 ILEOSTOMY STATUS: Chronic | ICD-10-CM

## 2024-04-26 DIAGNOSIS — R53.83 OTHER FATIGUE: ICD-10-CM

## 2024-04-26 DIAGNOSIS — Z90.49 ACQUIRED ABSENCE OF OTHER SPECIFIED PARTS OF DIGESTIVE TRACT: Chronic | ICD-10-CM

## 2024-04-26 DIAGNOSIS — M51.26 OTHER INTERVERTEBRAL DISC DISPLACEMENT, LUMBAR REGION: Chronic | ICD-10-CM

## 2024-04-26 DIAGNOSIS — Z98.89 OTHER SPECIFIED POSTPROCEDURAL STATES: Chronic | ICD-10-CM

## 2024-04-26 LAB
ADD ON TEST-SPECIMEN IN LAB: SIGNIFICANT CHANGE UP
ALBUMIN SERPL ELPH-MCNC: 4.1 G/DL — SIGNIFICANT CHANGE UP (ref 3.3–5)
ALP SERPL-CCNC: 103 U/L — SIGNIFICANT CHANGE UP (ref 40–120)
ALT FLD-CCNC: 11 U/L — SIGNIFICANT CHANGE UP (ref 10–45)
ANION GAP SERPL CALC-SCNC: 14 MMOL/L — SIGNIFICANT CHANGE UP (ref 5–17)
APPEARANCE UR: CLEAR — SIGNIFICANT CHANGE UP
AST SERPL-CCNC: 24 U/L — SIGNIFICANT CHANGE UP (ref 10–40)
B-OH-BUTYR SERPL-SCNC: 0.2 MMOL/L — SIGNIFICANT CHANGE UP
BACTERIA # UR AUTO: NEGATIVE /HPF — SIGNIFICANT CHANGE UP
BASE EXCESS BLDV CALC-SCNC: 3.5 MMOL/L — HIGH (ref -2–3)
BASOPHILS # BLD AUTO: 0.03 K/UL — SIGNIFICANT CHANGE UP (ref 0–0.2)
BASOPHILS NFR BLD AUTO: 0.3 % — SIGNIFICANT CHANGE UP (ref 0–2)
BILIRUB SERPL-MCNC: 0.5 MG/DL — SIGNIFICANT CHANGE UP (ref 0.2–1.2)
BILIRUB UR-MCNC: NEGATIVE — SIGNIFICANT CHANGE UP
BUN SERPL-MCNC: 36 MG/DL — HIGH (ref 7–23)
CA-I SERPL-SCNC: 1.19 MMOL/L — SIGNIFICANT CHANGE UP (ref 1.15–1.33)
CALCIUM SERPL-MCNC: 9.6 MG/DL — SIGNIFICANT CHANGE UP (ref 8.4–10.5)
CAST: 0 /LPF — SIGNIFICANT CHANGE UP (ref 0–4)
CHLORIDE BLDV-SCNC: 103 MMOL/L — SIGNIFICANT CHANGE UP (ref 96–108)
CHLORIDE SERPL-SCNC: 104 MMOL/L — SIGNIFICANT CHANGE UP (ref 96–108)
CO2 BLDV-SCNC: 33 MMOL/L — HIGH (ref 22–26)
CO2 SERPL-SCNC: 25 MMOL/L — SIGNIFICANT CHANGE UP (ref 22–31)
COLOR SPEC: YELLOW — SIGNIFICANT CHANGE UP
CREAT SERPL-MCNC: 1.82 MG/DL — HIGH (ref 0.5–1.3)
DIFF PNL FLD: ABNORMAL
EGFR: 27 ML/MIN/1.73M2 — LOW
EOSINOPHIL # BLD AUTO: 0.01 K/UL — SIGNIFICANT CHANGE UP (ref 0–0.5)
EOSINOPHIL NFR BLD AUTO: 0.1 % — SIGNIFICANT CHANGE UP (ref 0–6)
GAS PNL BLDV: 137 MMOL/L — SIGNIFICANT CHANGE UP (ref 136–145)
GAS PNL BLDV: SIGNIFICANT CHANGE UP
GAS PNL BLDV: SIGNIFICANT CHANGE UP
GLUCOSE BLDC GLUCOMTR-MCNC: 149 MG/DL — HIGH (ref 70–99)
GLUCOSE BLDV-MCNC: 53 MG/DL — CRITICAL LOW (ref 70–99)
GLUCOSE SERPL-MCNC: 54 MG/DL — CRITICAL LOW (ref 70–99)
GLUCOSE UR QL: NEGATIVE MG/DL — SIGNIFICANT CHANGE UP
HCO3 BLDV-SCNC: 31 MMOL/L — HIGH (ref 22–29)
HCT VFR BLD CALC: 46 % — HIGH (ref 34.5–45)
HCT VFR BLDA CALC: 41 % — SIGNIFICANT CHANGE UP (ref 34.5–46.5)
HGB BLD CALC-MCNC: 13.8 G/DL — SIGNIFICANT CHANGE UP (ref 11.7–16.1)
HGB BLD-MCNC: 14.1 G/DL — SIGNIFICANT CHANGE UP (ref 11.5–15.5)
IMM GRANULOCYTES NFR BLD AUTO: 0.5 % — SIGNIFICANT CHANGE UP (ref 0–0.9)
KETONES UR-MCNC: NEGATIVE MG/DL — SIGNIFICANT CHANGE UP
LACTATE BLDV-MCNC: 1.4 MMOL/L — SIGNIFICANT CHANGE UP (ref 0.5–2)
LEUKOCYTE ESTERASE UR-ACNC: ABNORMAL
LYMPHOCYTES # BLD AUTO: 0.97 K/UL — LOW (ref 1–3.3)
LYMPHOCYTES # BLD AUTO: 9.4 % — LOW (ref 13–44)
MAGNESIUM SERPL-MCNC: 2.1 MG/DL — SIGNIFICANT CHANGE UP (ref 1.6–2.6)
MCHC RBC-ENTMCNC: 29.2 PG — SIGNIFICANT CHANGE UP (ref 27–34)
MCHC RBC-ENTMCNC: 30.7 GM/DL — LOW (ref 32–36)
MCV RBC AUTO: 95.2 FL — SIGNIFICANT CHANGE UP (ref 80–100)
MONOCYTES # BLD AUTO: 0.59 K/UL — SIGNIFICANT CHANGE UP (ref 0–0.9)
MONOCYTES NFR BLD AUTO: 5.7 % — SIGNIFICANT CHANGE UP (ref 2–14)
NEUTROPHILS # BLD AUTO: 8.63 K/UL — HIGH (ref 1.8–7.4)
NEUTROPHILS NFR BLD AUTO: 84 % — HIGH (ref 43–77)
NITRITE UR-MCNC: NEGATIVE — SIGNIFICANT CHANGE UP
NRBC # BLD: 0 /100 WBCS — SIGNIFICANT CHANGE UP (ref 0–0)
NT-PROBNP SERPL-SCNC: 1305 PG/ML — HIGH (ref 0–300)
PCO2 BLDV: 59 MMHG — HIGH (ref 39–42)
PH BLDV: 7.33 — SIGNIFICANT CHANGE UP (ref 7.32–7.43)
PH UR: 6.5 — SIGNIFICANT CHANGE UP (ref 5–8)
PLATELET # BLD AUTO: 259 K/UL — SIGNIFICANT CHANGE UP (ref 150–400)
PO2 BLDV: 35 MMHG — SIGNIFICANT CHANGE UP (ref 25–45)
POTASSIUM BLDV-SCNC: 4.4 MMOL/L — SIGNIFICANT CHANGE UP (ref 3.5–5.1)
POTASSIUM SERPL-MCNC: 5.4 MMOL/L — HIGH (ref 3.5–5.3)
POTASSIUM SERPL-SCNC: 5.4 MMOL/L — HIGH (ref 3.5–5.3)
PROT SERPL-MCNC: 7.6 G/DL — SIGNIFICANT CHANGE UP (ref 6–8.3)
PROT UR-MCNC: 30 MG/DL
RBC # BLD: 4.83 M/UL — SIGNIFICANT CHANGE UP (ref 3.8–5.2)
RBC # FLD: 15.5 % — HIGH (ref 10.3–14.5)
RBC CASTS # UR COMP ASSIST: 25 /HPF — HIGH (ref 0–4)
SAO2 % BLDV: 52.7 % — LOW (ref 67–88)
SODIUM SERPL-SCNC: 143 MMOL/L — SIGNIFICANT CHANGE UP (ref 135–145)
SP GR SPEC: 1.02 — SIGNIFICANT CHANGE UP (ref 1–1.03)
SQUAMOUS # UR AUTO: 2 /HPF — SIGNIFICANT CHANGE UP (ref 0–5)
UROBILINOGEN FLD QL: 0.2 MG/DL — SIGNIFICANT CHANGE UP (ref 0.2–1)
WBC # BLD: 10.28 K/UL — SIGNIFICANT CHANGE UP (ref 3.8–10.5)
WBC # FLD AUTO: 10.28 K/UL — SIGNIFICANT CHANGE UP (ref 3.8–10.5)
WBC UR QL: 3 /HPF — SIGNIFICANT CHANGE UP (ref 0–5)

## 2024-04-26 PROCEDURE — 73552 X-RAY EXAM OF FEMUR 2/>: CPT | Mod: 26,RT

## 2024-04-26 PROCEDURE — 99285 EMERGENCY DEPT VISIT HI MDM: CPT

## 2024-04-26 PROCEDURE — 71045 X-RAY EXAM CHEST 1 VIEW: CPT | Mod: 26

## 2024-04-26 PROCEDURE — 72170 X-RAY EXAM OF PELVIS: CPT | Mod: 26

## 2024-04-26 PROCEDURE — 70450 CT HEAD/BRAIN W/O DYE: CPT | Mod: 26,MC

## 2024-04-26 PROCEDURE — 93010 ELECTROCARDIOGRAM REPORT: CPT

## 2024-04-26 RX ORDER — ACETAMINOPHEN 500 MG
1000 TABLET ORAL ONCE
Refills: 0 | Status: COMPLETED | OUTPATIENT
Start: 2024-04-26 | End: 2024-04-26

## 2024-04-26 RX ORDER — DEXTROSE 50 % IN WATER 50 %
50 SYRINGE (ML) INTRAVENOUS ONCE
Refills: 0 | Status: COMPLETED | OUTPATIENT
Start: 2024-04-26 | End: 2024-04-26

## 2024-04-26 RX ADMIN — Medication 400 MILLIGRAM(S): at 23:29

## 2024-04-26 NOTE — ED ADULT NURSE REASSESSMENT NOTE - NS ED NURSE REASSESS COMMENT FT1
Report received from MARK Mehta/Pratibha in red. Patient A&Ox4, breathing comfortably on room air, no accessory muscle use, NSR on the monitor, right arm noted to be swollen at baseline. Patient has no IV access at this time. Plan for admission.

## 2024-04-26 NOTE — ED ADULT NURSE NOTE - ALCOHOL PRE SCREEN (AUDIT - C)
[FreeTextEntry1] : This note was written by Nona Shah on 07/01/2019 acting as a scribe for JOSIE GALLARDO  Statement Selected

## 2024-04-26 NOTE — ED ADULT NURSE NOTE - OBJECTIVE STATEMENT
84YOF hx DVT on AC/CHF/HTN/HLD/DM2/Breast Ca BIBEMS from home c/o fall and lower extremity weakness, pt states she slid to ground when getting up from bed, denies head strike, no LOC. Pt called EMS yesterday for same issue, was found hyperglycemic and given Oral + IV glucose at scene. Today per EMS sugar was 80's, has not eaten or drank since last night, on long acting insulin currently. AOx4, pulses strong, intact motor/sensory, PERRL, endorses pain to R shoulder and hip. R upper extremity edematous, b/l LE dark mottled. Denies headache/chest pain/SOB/fever/chills/nausea/vomiting/diarrhea.

## 2024-04-26 NOTE — ED PROVIDER NOTE - ATTENDING CONTRIBUTION TO CARE
Private Physician Katie Espinoza, PCP, [facility]  84y f pmh DM, CHF/Pulmonary HTN, Breast cs sp mastectomy/chemo, Rectal ca, HTN,HLD, DVT, renal insuffency Pt comes to ed via ems c/o Private Physician Katie Espinoza, PCP, [facility]  84y f pmh DM, CHF/Pulmonary HTN, Breast cs sp mastectomy/chemo, Rectal ca, HTN,HLD, DVT on ac, renal insuffiencey Pt comes to ed via ems c/o lower ex weakness Pt had gotten up yesterday and had diff ambulating and slid to floor no head trauma. No fever chills cp shortness of breath. Daughter arrived and got pt up from floor and was ok all day. Today same thing happened but was unable to get up from floor ems called and pt came to ed enroute glu was 80 and was treated w glucose. HEENT normocephalic atraumatic neck supple chest clear anterior & posterior cv no rubs, gallops or murmurs abd soft +bs no mass guarding rebound MSK mild pain flexion rt hip. Sensation intact  Chepe Bruno MD, Facep

## 2024-04-26 NOTE — ED PROVIDER NOTE - PHYSICAL EXAMINATION
Exam as stated below:   CONSTITUTIONAL: In NAD.   SKIN: Warm dry.   EYES: No scleral icterus. Conjunctiva pink.  ENT: Posterior pharynx with no erythema.  tympanic membrane and canal without erythema, no effusions noted bilateral  NECK: No ttp.    CARD: RRR. No murmurs.  RESP: Clear to ausculation b/l. No Crackles noted. No Wheezing noted.  ABD: Soft. Non-tender. Not distended.   MSK: No pedal edema. No calf tenderness.   2+ DPs bl   NEURO: UE/LE grossly intact. Motor UE/LE sensation grossly intact. CN II-XII grossly intact.   PSYCH: Cooperative, appropriate.

## 2024-04-26 NOTE — ED PROVIDER NOTE - OBJECTIVE STATEMENT
HPI & ROS: 84-year-old female with past medical history of  UTI,  DVT and is on Eliquis breast cancer with a right mastectomy, with chemo, CHF, DVT, hypertension, hyperlipidemia, rectal cancer with chemo and radiation, renal insufficiency, pulmonary hypertension, type 2 diabetes, right hip replacement.  She is coming in with slide to the ground today.  Slid to ground today and yesterday.  Did not hit head.  sugars in the 200 yesterday, took her insulin and did not eat.  Today sugar was 80.  No glucose was given apart from what the triage note says.  Patient not having any fevers no chills no nausea no vomiting.  No chest pain no shortness of breath.  Legs not swollen no unilateral sensation changes in her upper or lower extremity.  No aphasia, no word finding difficulty.  No ear pain.   Patient with mild pain in her right hip.  Took no medicine for it

## 2024-04-26 NOTE — ED PROVIDER NOTE - CLINICAL SUMMARY MEDICAL DECISION MAKING FREE TEXT BOX
MDM/Summary/DDx (includes but is not limited to):  mild MSK pain in the right lower extremity.  Neurovascular intact.  Not concern for DVT.   Question of MSK pain.  Question of why she fell, ACS?  Arrhythmia electrolyte abnormality?  Exam and history is not consistent with pulmonary embolism.  Exam and history is consistent with possible UTI versus pyelonephritis. not c/w of other intra-abdominal path such as AAA.  pneumonia is less likely though it is possible.  Will scan the head for possible mass versus bleed.  No headache to suggest subarachnoid, no FND   Labs:  CBC CMP PT PTT type and screen UA UC twelve-lead bedside monitor  Imaging:   Tx: Supportive, pain/nausea medications as pt requires/requests.  Consults/Resources:   Dispo:     Triage note reviewed. VS reviewed. EKG reviewed and documented in "RESULTS" section, if possible at given time.     DDx in MDM includes the most likely ddx, but is not limited to solely what is listed. Clinical course may alter/deviate from the above plan. When possible, progress notes written, as needed, and are included in "PROGRESS NOTE" section below.       Medical, family, and social determinants of health reviewed and discussed w/ pt/family/caretaker, when allowable, and is incorporated into note above, whenever possible.

## 2024-04-26 NOTE — ED ADULT NURSE NOTE - NSFALLHARMRISKINTERV_ED_ALL_ED

## 2024-04-26 NOTE — ED ADULT NURSE REASSESSMENT NOTE - NS ED NURSE REASSESS COMMENT FT1
pt given turkey sandwich and ginger ale, cranberry juice, apple juice, pt able to tolerate PO, pending IV access, attempted x3 PIV via nursing staff, and x1 USIV via ER MD, pending dispo

## 2024-04-27 DIAGNOSIS — I25.10 ATHEROSCLEROTIC HEART DISEASE OF NATIVE CORONARY ARTERY WITHOUT ANGINA PECTORIS: ICD-10-CM

## 2024-04-27 DIAGNOSIS — R53.1 WEAKNESS: ICD-10-CM

## 2024-04-27 DIAGNOSIS — E11.649 TYPE 2 DIABETES MELLITUS WITH HYPOGLYCEMIA WITHOUT COMA: ICD-10-CM

## 2024-04-27 DIAGNOSIS — Z29.9 ENCOUNTER FOR PROPHYLACTIC MEASURES, UNSPECIFIED: ICD-10-CM

## 2024-04-27 DIAGNOSIS — I50.32 CHRONIC DIASTOLIC (CONGESTIVE) HEART FAILURE: ICD-10-CM

## 2024-04-27 DIAGNOSIS — R31.29 OTHER MICROSCOPIC HEMATURIA: ICD-10-CM

## 2024-04-27 DIAGNOSIS — I48.20 CHRONIC ATRIAL FIBRILLATION, UNSPECIFIED: ICD-10-CM

## 2024-04-27 DIAGNOSIS — N18.4 CHRONIC KIDNEY DISEASE, STAGE 4 (SEVERE): ICD-10-CM

## 2024-04-27 LAB
A1C WITH ESTIMATED AVERAGE GLUCOSE RESULT: 6.6 % — HIGH (ref 4–5.6)
ADD ON TEST-SPECIMEN IN LAB: SIGNIFICANT CHANGE UP
ANION GAP SERPL CALC-SCNC: 10 MMOL/L — SIGNIFICANT CHANGE UP (ref 5–17)
ANION GAP SERPL CALC-SCNC: 10 MMOL/L — SIGNIFICANT CHANGE UP (ref 5–17)
BUN SERPL-MCNC: 32 MG/DL — HIGH (ref 7–23)
BUN SERPL-MCNC: 33 MG/DL — HIGH (ref 7–23)
CALCIUM SERPL-MCNC: 9 MG/DL — SIGNIFICANT CHANGE UP (ref 8.4–10.5)
CALCIUM SERPL-MCNC: 9.4 MG/DL — SIGNIFICANT CHANGE UP (ref 8.4–10.5)
CHLORIDE SERPL-SCNC: 103 MMOL/L — SIGNIFICANT CHANGE UP (ref 96–108)
CHLORIDE SERPL-SCNC: 105 MMOL/L — SIGNIFICANT CHANGE UP (ref 96–108)
CK MB CFR SERPL CALC: 2.6 NG/ML — SIGNIFICANT CHANGE UP (ref 0–3.8)
CK SERPL-CCNC: 103 U/L — SIGNIFICANT CHANGE UP (ref 25–170)
CO2 SERPL-SCNC: 26 MMOL/L — SIGNIFICANT CHANGE UP (ref 22–31)
CO2 SERPL-SCNC: 26 MMOL/L — SIGNIFICANT CHANGE UP (ref 22–31)
CREAT SERPL-MCNC: 1.74 MG/DL — HIGH (ref 0.5–1.3)
CREAT SERPL-MCNC: 1.77 MG/DL — HIGH (ref 0.5–1.3)
EGFR: 28 ML/MIN/1.73M2 — LOW
EGFR: 28 ML/MIN/1.73M2 — LOW
ESTIMATED AVERAGE GLUCOSE: 143 MG/DL — HIGH (ref 68–114)
GLUCOSE BLDC GLUCOMTR-MCNC: 114 MG/DL — HIGH (ref 70–99)
GLUCOSE BLDC GLUCOMTR-MCNC: 122 MG/DL — HIGH (ref 70–99)
GLUCOSE BLDC GLUCOMTR-MCNC: 131 MG/DL — HIGH (ref 70–99)
GLUCOSE BLDC GLUCOMTR-MCNC: 214 MG/DL — HIGH (ref 70–99)
GLUCOSE BLDC GLUCOMTR-MCNC: 99 MG/DL — SIGNIFICANT CHANGE UP (ref 70–99)
GLUCOSE SERPL-MCNC: 112 MG/DL — HIGH (ref 70–99)
GLUCOSE SERPL-MCNC: 113 MG/DL — HIGH (ref 70–99)
HCT VFR BLD CALC: 41.5 % — SIGNIFICANT CHANGE UP (ref 34.5–45)
HGB BLD-MCNC: 12.3 G/DL — SIGNIFICANT CHANGE UP (ref 11.5–15.5)
MCHC RBC-ENTMCNC: 28.5 PG — SIGNIFICANT CHANGE UP (ref 27–34)
MCHC RBC-ENTMCNC: 29.6 GM/DL — LOW (ref 32–36)
MCV RBC AUTO: 96.3 FL — SIGNIFICANT CHANGE UP (ref 80–100)
NRBC # BLD: 0 /100 WBCS — SIGNIFICANT CHANGE UP (ref 0–0)
PLATELET # BLD AUTO: 250 K/UL — SIGNIFICANT CHANGE UP (ref 150–400)
POTASSIUM SERPL-MCNC: 4 MMOL/L — SIGNIFICANT CHANGE UP (ref 3.5–5.3)
POTASSIUM SERPL-MCNC: 4.2 MMOL/L — SIGNIFICANT CHANGE UP (ref 3.5–5.3)
POTASSIUM SERPL-SCNC: 4 MMOL/L — SIGNIFICANT CHANGE UP (ref 3.5–5.3)
POTASSIUM SERPL-SCNC: 4.2 MMOL/L — SIGNIFICANT CHANGE UP (ref 3.5–5.3)
RBC # BLD: 4.31 M/UL — SIGNIFICANT CHANGE UP (ref 3.8–5.2)
RBC # FLD: 15.5 % — HIGH (ref 10.3–14.5)
SODIUM SERPL-SCNC: 139 MMOL/L — SIGNIFICANT CHANGE UP (ref 135–145)
SODIUM SERPL-SCNC: 141 MMOL/L — SIGNIFICANT CHANGE UP (ref 135–145)
TROPONIN T, HIGH SENSITIVITY RESULT: 32 NG/L — SIGNIFICANT CHANGE UP (ref 0–51)
WBC # BLD: 6.7 K/UL — SIGNIFICANT CHANGE UP (ref 3.8–10.5)
WBC # FLD AUTO: 6.7 K/UL — SIGNIFICANT CHANGE UP (ref 3.8–10.5)

## 2024-04-27 PROCEDURE — 99223 1ST HOSP IP/OBS HIGH 75: CPT

## 2024-04-27 PROCEDURE — 93010 ELECTROCARDIOGRAM REPORT: CPT

## 2024-04-27 RX ORDER — ASPIRIN/CALCIUM CARB/MAGNESIUM 324 MG
81 TABLET ORAL DAILY
Refills: 0 | Status: DISCONTINUED | OUTPATIENT
Start: 2024-04-27 | End: 2024-04-29

## 2024-04-27 RX ORDER — DEXTROSE 50 % IN WATER 50 %
15 SYRINGE (ML) INTRAVENOUS ONCE
Refills: 0 | Status: DISCONTINUED | OUTPATIENT
Start: 2024-04-27 | End: 2024-04-29

## 2024-04-27 RX ORDER — PANTOPRAZOLE SODIUM 20 MG/1
40 TABLET, DELAYED RELEASE ORAL
Refills: 0 | Status: DISCONTINUED | OUTPATIENT
Start: 2024-04-27 | End: 2024-04-29

## 2024-04-27 RX ORDER — DEXTROSE 50 % IN WATER 50 %
12.5 SYRINGE (ML) INTRAVENOUS ONCE
Refills: 0 | Status: DISCONTINUED | OUTPATIENT
Start: 2024-04-27 | End: 2024-04-29

## 2024-04-27 RX ORDER — BUMETANIDE 0.25 MG/ML
1 INJECTION INTRAMUSCULAR; INTRAVENOUS DAILY
Refills: 0 | Status: DISCONTINUED | OUTPATIENT
Start: 2024-04-27 | End: 2024-04-29

## 2024-04-27 RX ORDER — INSULIN LISPRO 100/ML
VIAL (ML) SUBCUTANEOUS AT BEDTIME
Refills: 0 | Status: DISCONTINUED | OUTPATIENT
Start: 2024-04-27 | End: 2024-04-29

## 2024-04-27 RX ORDER — ACETAMINOPHEN 500 MG
650 TABLET ORAL EVERY 6 HOURS
Refills: 0 | Status: DISCONTINUED | OUTPATIENT
Start: 2024-04-27 | End: 2024-04-29

## 2024-04-27 RX ORDER — ATORVASTATIN CALCIUM 80 MG/1
40 TABLET, FILM COATED ORAL AT BEDTIME
Refills: 0 | Status: DISCONTINUED | OUTPATIENT
Start: 2024-04-27 | End: 2024-04-29

## 2024-04-27 RX ORDER — INSULIN LISPRO 100/ML
VIAL (ML) SUBCUTANEOUS
Refills: 0 | Status: DISCONTINUED | OUTPATIENT
Start: 2024-04-27 | End: 2024-04-29

## 2024-04-27 RX ORDER — SODIUM CHLORIDE 9 MG/ML
1000 INJECTION, SOLUTION INTRAVENOUS
Refills: 0 | Status: DISCONTINUED | OUTPATIENT
Start: 2024-04-27 | End: 2024-04-29

## 2024-04-27 RX ORDER — ONDANSETRON 8 MG/1
4 TABLET, FILM COATED ORAL EVERY 8 HOURS
Refills: 0 | Status: DISCONTINUED | OUTPATIENT
Start: 2024-04-27 | End: 2024-04-29

## 2024-04-27 RX ORDER — DEXTROSE 50 % IN WATER 50 %
25 SYRINGE (ML) INTRAVENOUS ONCE
Refills: 0 | Status: DISCONTINUED | OUTPATIENT
Start: 2024-04-27 | End: 2024-04-29

## 2024-04-27 RX ORDER — LANOLIN ALCOHOL/MO/W.PET/CERES
3 CREAM (GRAM) TOPICAL AT BEDTIME
Refills: 0 | Status: DISCONTINUED | OUTPATIENT
Start: 2024-04-27 | End: 2024-04-29

## 2024-04-27 RX ORDER — APIXABAN 2.5 MG/1
2.5 TABLET, FILM COATED ORAL
Refills: 0 | Status: DISCONTINUED | OUTPATIENT
Start: 2024-04-27 | End: 2024-04-29

## 2024-04-27 RX ORDER — METOPROLOL TARTRATE 50 MG
100 TABLET ORAL
Refills: 0 | Status: DISCONTINUED | OUTPATIENT
Start: 2024-04-27 | End: 2024-04-29

## 2024-04-27 RX ORDER — GLUCAGON INJECTION, SOLUTION 0.5 MG/.1ML
1 INJECTION, SOLUTION SUBCUTANEOUS ONCE
Refills: 0 | Status: DISCONTINUED | OUTPATIENT
Start: 2024-04-27 | End: 2024-04-29

## 2024-04-27 RX ORDER — SPIRONOLACTONE 25 MG/1
12.5 TABLET, FILM COATED ORAL DAILY
Refills: 0 | Status: DISCONTINUED | OUTPATIENT
Start: 2024-04-27 | End: 2024-04-29

## 2024-04-27 RX ORDER — FERROUS SULFATE 325(65) MG
325 TABLET ORAL DAILY
Refills: 0 | Status: DISCONTINUED | OUTPATIENT
Start: 2024-04-27 | End: 2024-04-29

## 2024-04-27 RX ORDER — INSULIN LISPRO 100/ML
2 VIAL (ML) SUBCUTANEOUS ONCE
Refills: 0 | Status: COMPLETED | OUTPATIENT
Start: 2024-04-27 | End: 2024-04-27

## 2024-04-27 RX ORDER — GABAPENTIN 400 MG/1
200 CAPSULE ORAL DAILY
Refills: 0 | Status: DISCONTINUED | OUTPATIENT
Start: 2024-04-27 | End: 2024-04-29

## 2024-04-27 RX ORDER — ACETAMINOPHEN 500 MG
1000 TABLET ORAL ONCE
Refills: 0 | Status: COMPLETED | OUTPATIENT
Start: 2024-04-27 | End: 2024-04-27

## 2024-04-27 RX ORDER — METOPROLOL TARTRATE 50 MG
100 TABLET ORAL ONCE
Refills: 0 | Status: COMPLETED | OUTPATIENT
Start: 2024-04-27 | End: 2024-04-27

## 2024-04-27 RX ORDER — DEXTROSE 10 % IN WATER 10 %
125 INTRAVENOUS SOLUTION INTRAVENOUS ONCE
Refills: 0 | Status: DISCONTINUED | OUTPATIENT
Start: 2024-04-27 | End: 2024-04-29

## 2024-04-27 RX ADMIN — APIXABAN 2.5 MILLIGRAM(S): 2.5 TABLET, FILM COATED ORAL at 17:04

## 2024-04-27 RX ADMIN — ATORVASTATIN CALCIUM 40 MILLIGRAM(S): 80 TABLET, FILM COATED ORAL at 22:11

## 2024-04-27 RX ADMIN — Medication 81 MILLIGRAM(S): at 13:30

## 2024-04-27 RX ADMIN — Medication 1000 MILLIGRAM(S): at 00:03

## 2024-04-27 RX ADMIN — Medication 650 MILLIGRAM(S): at 20:53

## 2024-04-27 RX ADMIN — Medication 400 MILLIGRAM(S): at 05:41

## 2024-04-27 RX ADMIN — Medication 325 MILLIGRAM(S): at 13:30

## 2024-04-27 RX ADMIN — Medication 2 UNIT(S): at 16:09

## 2024-04-27 RX ADMIN — Medication 650 MILLIGRAM(S): at 21:30

## 2024-04-27 RX ADMIN — Medication 100 MILLIGRAM(S): at 20:52

## 2024-04-27 RX ADMIN — Medication 2: at 16:09

## 2024-04-27 RX ADMIN — Medication 1000 MILLIGRAM(S): at 06:11

## 2024-04-27 RX ADMIN — GABAPENTIN 200 MILLIGRAM(S): 400 CAPSULE ORAL at 13:29

## 2024-04-27 RX ADMIN — Medication 100 MILLIGRAM(S): at 13:29

## 2024-04-27 NOTE — PROVIDER CONTACT NOTE (OTHER) - BACKGROUND
84yoF PMHx DVT on Eliquis, breast cancer s/p right mastectomy, with chemo, CHF, DVT, HTN, HLD, rectal cancer s/p chemo and radiation

## 2024-04-27 NOTE — PATIENT PROFILE ADULT - FALL HARM RISK - HARM RISK INTERVENTIONS
Assistance with ambulation/Assistance OOB with selected safe patient handling equipment/Communicate Risk of Fall with Harm to all staff/Discuss with provider need for PT consult/Monitor for mental status changes/Monitor gait and stability/Provide patient with walking aids - walker, cane, crutches/Reinforce activity limits and safety measures with patient and family/Tailored Fall Risk Interventions/Visual Cue: Yellow wristband and red socks/Bed in lowest position, wheels locked, appropriate side rails in place/Call bell, personal items and telephone in reach/Instruct patient to call for assistance before getting out of bed or chair/Non-slip footwear when patient is out of bed/[facility] to call system/Physically safe environment - no spills, clutter or unnecessary equipment/Purposeful Proactive Rounding/Room/bathroom lighting operational, light cord in reach

## 2024-04-27 NOTE — PATIENT PROFILE ADULT - FUNCTIONAL ASSESSMENT - BASIC MOBILITY 6.
3-calculated by average/Not able to assess (calculate score using [facility] averaging method)

## 2024-04-27 NOTE — H&P ADULT - PROBLEM SELECTOR PLAN 1
Pt takes lantus 21 units qHS with novolog 7 units prior to her mid-day dinner. Dose has been unchanged for 1 year, since initiating insulin  - pt does not see an endocrinologist by choice  - fingersticks 39 on hospital presentation, now improved to 99 with dextrose push and after giving pt something to eat  - trend fingersticks off insulin for now  - obtain hgb a1c (added onto morning labs) Pt takes lantus 21 units qHS with novolog 7 units prior to her mid-day dinner. Dose has been unchanged for 1 year, since initiating insulin  - pt does not see an endocrinologist by choice  - fingersticks 39 on hospital presentation, now improved to 99 with dextrose push and after giving pt something to eat  - trend fingersticks off insulin for now, q6hrs  - obtain hgb a1c (added onto morning labs)

## 2024-04-27 NOTE — H&P ADULT - PROBLEM SELECTOR PLAN 5
- DVT ppx:   - GI ppx:   - Diet: DASH, diabetic  - fall precautions. Pending PT    Prepare for discharge, estimated discharge date 4/28. - continue home aspirin

## 2024-04-27 NOTE — H&P ADULT - PROBLEM SELECTOR PLAN 8
- DVT ppx:   - GI ppx:   - Diet: DASH, diabetic  - fall precautions. Pending PT    Prepare for discharge, estimated discharge date 4/28.

## 2024-04-27 NOTE — H&P ADULT - NSHPLABSRESULTS_GEN_ALL_CORE
LABS:                         12.3   6.70  )-----------( 250      ( 27 Apr 2024 09:54 )             41.5     04-27    139  |  103  |  32<H>  ----------------------------<  112<H>  4.2   |  26  |  1.74<H>    Ca    9.4      27 Apr 2024 09:54  Mg     2.1     04-26    TPro  7.6  /  Alb  4.1  /  TBili  0.5  /  DBili  x   /  AST  24  /  ALT  11  /  AlkPhos  103  04-26      Urinalysis Basic - ( 27 Apr 2024 09:54 )    Color: x / Appearance: x / SG: x / pH: x  Gluc: 112 mg/dL / Ketone: x  / Bili: x / Urobili: x   Blood: x / Protein: x / Nitrite: x   Leuk Esterase: x / RBC: x / WBC x   Sq Epi: x / Non Sq Epi: x / Bacteria: x      CARDIAC MARKERS ( 27 Apr 2024 00:52 )  x     / x     / 103 U/L / x     / 2.6 ng/mL          Records reviewed from prior hospitalization.  Labs reviewed remarkable for   EKG personally reviewed   CXR personally reviewed LABS:                         12.3   6.70  )-----------( 250      ( 27 Apr 2024 09:54 )             41.5     04-27    139  |  103  |  32<H>  ----------------------------<  112<H>  4.2   |  26  |  1.74<H>    Ca    9.4      27 Apr 2024 09:54  Mg     2.1     04-26    TPro  7.6  /  Alb  4.1  /  TBili  0.5  /  DBili  x   /  AST  24  /  ALT  11  /  AlkPhos  103  04-26      Urinalysis Basic - ( 27 Apr 2024 09:54 )    Color: x / Appearance: x / SG: x / pH: x  Gluc: 112 mg/dL / Ketone: x  / Bili: x / Urobili: x   Blood: x / Protein: x / Nitrite: x   Leuk Esterase: x / RBC: x / WBC x   Sq Epi: x / Non Sq Epi: x / Bacteria: x      CARDIAC MARKERS ( 27 Apr 2024 00:52 )  x     / x     / 103 U/L / x     / 2.6 ng/mL          Records reviewed from prior hospitalization.  Labs reviewed remarkable for - Labs remarkable for glucose of 54 (confirmed on fingerstick), BUN/Cr of 36/1.82 (baseline creatinine 1.6 - 1.7), CK wnl at 103. U/A is negative for UTI but does demonstrate microscopic hematuria.  CXR personally reviewed - mild pulmonary venous congestion.

## 2024-04-27 NOTE — PATIENT PROFILE ADULT - FUNCTIONAL ASSESSMENT - DAILY ACTIVITY 2.
PAST MEDICAL HISTORY:  2019 novel coronavirus disease (COVID-19)     Fall 3/22    HLD (hyperlipidemia)     HTN (hypertension)     Lower back pain     OA (osteoarthritis)     Shoulder pain, right s/p fall    Sinusitis     
3 = A little assistance

## 2024-04-27 NOTE — H&P ADULT - ASSESSMENT
84 year old female with a PMH of HTN, HLD, DM2, HFpEF (EF 65%), CAD s/p CABG, hx of recurrent SVT, chronic atrial fibrillation (on eliquis), Breast CA s/p R partial mastectomy, rectal CA s/p resection who presents with generalized weakness in the setting of severe hypoglycemia. Pt presents with multiple hypoglycemic episodes of the past few days of unclear etiology. Pt insulin dosing has not changed recently and pt denies any change in dietary habits. Generalized weakness is now improved now with improvement in blood glucose. Will monitor her off insulin for now as fingersticks are in the low 100s without insulin. She likely will require a lower dose of insulin upon discharge.

## 2024-04-27 NOTE — H&P ADULT - PROBLEM SELECTOR PLAN 2
In the setting of hypoglycemia. Pt now improved s/p normalization of blood sugars  - will obtain PT consult given multiple episodes of slip and falls  - pt endorsed dizziness after walking a few steps. Will obtain orthostatic vital signs

## 2024-04-27 NOTE — H&P ADULT - HISTORY OF PRESENT ILLNESS
84 year old female with a PMH of HTN, HLD, DM2, HFpEF (EF 65%), CAD s/p CABG, hx of recurrent SVT, chronic atrial fibrillation (on eliquis), Breast CA s/p R partial mastectomy, rectal CA s/p resection who presents with           In the ED, VSS. Labs remarkable for glucose of 54 (confirmed on fingerstick), BUN/Cr of 36/1.82 (baseline creatinine 1.6 - 1.7), CK wnl at 103. U/A is negative for UTI but does demonstrate microscopic hematuria.  CT head negative for acute ICH, mass effect, or fracture.  CXR: mild pulmonary venous congestion.  X ray pelvis, right femur negative for fracture. Pt is s/p right total hip arthroplasty without hardware complication.    ED interventions: acetaminophen 1000 mg IV x2, dextrose push 84 year old female with a PMH of HTN, HLD, DM2, HFpEF (EF 65%), CAD s/p CABG, hx of recurrent SVT, chronic atrial fibrillation (on eliquis), Breast CA s/p R partial mastectomy, rectal CA s/p resection who presents with low blood sugars. History was obtained from the patient and from the daughter, Shauna Almodovar, over the phone. Pt was in her typical state of health until about 3-4 days ago when she felt weak. Daughter called EMS and fingerstick on site was low into the 30s. EMS administered IV dextrose and patient drank juice after which fingerstick improved and the patient felt better. Pt did not wish to come to the hospital at that time. Over the next two days, the patient had additional episodes of slipping off her bed, difficult to get up on her own. Pt herself however denies any falls. States she felt generalized weakness in the lower extremities that made it difficult for her to stand up. She typically ambulates with a walker.     The patient takes lantus 21 units at bedtime and novolog 7 units prior to dinner. Daughter states that the patient does not eat traditional 3 meals but instead has a large dinner in late afternoon. She has smaller meals or snacks otherwise during the day. This pattern has not significantly changed in the recent past. The patient has no change in insulin dosing over the last year and until the last few days had not had issues with low blood sugars. She does not see an endocrinologist by choice as patient felt she was seeing too many doctors; her insulin is managed by her primary care physician.     In the ED, VSS. Labs remarkable for glucose of 54 (confirmed on fingerstick), BUN/Cr of 36/1.82 (baseline creatinine 1.6 - 1.7), CK wnl at 103. U/A is negative for UTI but does demonstrate microscopic hematuria.  CT head negative for acute ICH, mass effect, or fracture.  CXR: mild pulmonary venous congestion.  X ray pelvis, right femur negative for fracture. Pt is s/p right total hip arthroplasty without hardware complication.    ED interventions: acetaminophen 1000 mg IV x2, dextrose push

## 2024-04-27 NOTE — H&P ADULT - NSHPADDITIONALINFOADULT_GEN_ALL_CORE
Update provided to the patient's daughter over the phone. Daughter to bring in list of the patient's home medications this morning. Update provided to the patient's daughter over the phone.     D/w NITA, Ashley Puente.

## 2024-04-27 NOTE — H&P ADULT - PROBLEM SELECTOR PLAN 4
- f/u outpatient PMD; pt will need repeat urinalysis - continue home eliquis 2.5mg BID  - continue home metoprolol tartrate 100mg BID

## 2024-04-27 NOTE — PATIENT PROFILE ADULT - NSPRESCRUSEDDRG_GEN_A_NUR
Tylenol/ibuprofen as needed for fever or discomfort.  Encourage fluids.  Diet as tolerated.  You can go back to  when you have been afebrile for 24 hours without medications.  Recheck in clinic if persistent problems.  Return to the ED if worse/concerns.  It was nice visiting with you and your mom tonight.  I hope you feel better soon.  Keep smiling!    Thank you for choosing [facility]. We appreciate the opportunity to meet your urgent medical needs. Please let us know if we could have done anything to make your stay more satisfying.    After discharge, please closely monitor for any new or worsening symptoms. Return to the Emergency Department if you develop any acute worsening signs or symptoms.    If you had lab work, cultures or imaging studies done during your stay, the final results may still be pending. We will call you if your plan of care needs to change. However, if you are not improving as expected, please follow up with your primary care provider or clinic.     Start any prescription medications that were prescribed to you and take them as directed.     Please see additional handouts that may be pertinent to your condition.         No

## 2024-04-27 NOTE — H&P ADULT - NSHPLANGTRANSLATORFT_GEN_A_CORE
[place]  ID [id number]. Of note, pt speaks a dialect of Bulgarian which was difficult for  to understand at times

## 2024-04-27 NOTE — H&P ADULT - GAIT/BALANCE
Pt was able to take a few steps with walker and with close supervision. No gross gait abnormalities detected

## 2024-04-27 NOTE — H&P ADULT - NSHPPHYSICALEXAM_GEN_ALL_CORE
Vital Signs Last 24 Hrs  T(C): 36.6 (27 Apr 2024 10:33), Max: 36.8 (26 Apr 2024 18:54)  T(F): 97.8 (27 Apr 2024 10:33), Max: 98.3 (26 Apr 2024 18:54)  HR: 128 (27 Apr 2024 10:33) (66 - 128)  BP: 156/97 (27 Apr 2024 10:33) (112/64 - 156/97)  BP(mean): --  RR: 18 (27 Apr 2024 10:33) (17 - 18)  SpO2: 94% (27 Apr 2024 10:33) (94% - 97%)    Parameters below as of 27 Apr 2024 10:33  Patient On (Oxygen Delivery Method): room air

## 2024-04-27 NOTE — PROVIDER CONTACT NOTE (OTHER) - ASSESSMENT
PT is A.O x4, denies any chest pain, SOB and any discomfort. PT denies palpitation. PT was ambulating on the room HR went up to 130s. PT hr sustaining to 128. BP is elevated and is 156/91.

## 2024-04-27 NOTE — H&P ADULT - NSHPSOCIALHISTORY_GEN_ALL_CORE
Lives in private home, independently. Daughter frequently comes to assist the patient with bathing and cleaning her home; however, the patient still cooks on her own. She ambulates with walker assistance. She has no home health aides or visiting nurses.

## 2024-04-28 LAB
ANION GAP SERPL CALC-SCNC: 12 MMOL/L — SIGNIFICANT CHANGE UP (ref 5–17)
BUN SERPL-MCNC: 31 MG/DL — HIGH (ref 7–23)
CALCIUM SERPL-MCNC: 9.4 MG/DL — SIGNIFICANT CHANGE UP (ref 8.4–10.5)
CHLORIDE SERPL-SCNC: 105 MMOL/L — SIGNIFICANT CHANGE UP (ref 96–108)
CO2 SERPL-SCNC: 22 MMOL/L — SIGNIFICANT CHANGE UP (ref 22–31)
CREAT SERPL-MCNC: 1.61 MG/DL — HIGH (ref 0.5–1.3)
EGFR: 31 ML/MIN/1.73M2 — LOW
GLUCOSE BLDC GLUCOMTR-MCNC: 134 MG/DL — HIGH (ref 70–99)
GLUCOSE BLDC GLUCOMTR-MCNC: 187 MG/DL — HIGH (ref 70–99)
GLUCOSE BLDC GLUCOMTR-MCNC: 219 MG/DL — HIGH (ref 70–99)
GLUCOSE BLDC GLUCOMTR-MCNC: 224 MG/DL — HIGH (ref 70–99)
GLUCOSE SERPL-MCNC: 131 MG/DL — HIGH (ref 70–99)
MAGNESIUM SERPL-MCNC: 2.2 MG/DL — SIGNIFICANT CHANGE UP (ref 1.6–2.6)
PHOSPHATE SERPL-MCNC: 3.4 MG/DL — SIGNIFICANT CHANGE UP (ref 2.5–4.5)
POTASSIUM SERPL-MCNC: 5.1 MMOL/L — SIGNIFICANT CHANGE UP (ref 3.5–5.3)
POTASSIUM SERPL-SCNC: 5.1 MMOL/L — SIGNIFICANT CHANGE UP (ref 3.5–5.3)
SODIUM SERPL-SCNC: 139 MMOL/L — SIGNIFICANT CHANGE UP (ref 135–145)

## 2024-04-28 PROCEDURE — 99233 SBSQ HOSP IP/OBS HIGH 50: CPT

## 2024-04-28 PROCEDURE — 99222 1ST HOSP IP/OBS MODERATE 55: CPT

## 2024-04-28 RX ADMIN — Medication 100 MILLIGRAM(S): at 18:38

## 2024-04-28 RX ADMIN — PANTOPRAZOLE SODIUM 40 MILLIGRAM(S): 20 TABLET, DELAYED RELEASE ORAL at 05:58

## 2024-04-28 RX ADMIN — SPIRONOLACTONE 12.5 MILLIGRAM(S): 25 TABLET, FILM COATED ORAL at 05:58

## 2024-04-28 RX ADMIN — Medication 650 MILLIGRAM(S): at 20:12

## 2024-04-28 RX ADMIN — Medication 2: at 12:49

## 2024-04-28 RX ADMIN — Medication 325 MILLIGRAM(S): at 12:25

## 2024-04-28 RX ADMIN — Medication 650 MILLIGRAM(S): at 20:45

## 2024-04-28 RX ADMIN — ATORVASTATIN CALCIUM 40 MILLIGRAM(S): 80 TABLET, FILM COATED ORAL at 21:06

## 2024-04-28 RX ADMIN — Medication 1: at 18:15

## 2024-04-28 RX ADMIN — APIXABAN 2.5 MILLIGRAM(S): 2.5 TABLET, FILM COATED ORAL at 18:38

## 2024-04-28 RX ADMIN — GABAPENTIN 200 MILLIGRAM(S): 400 CAPSULE ORAL at 12:27

## 2024-04-28 RX ADMIN — BUMETANIDE 1 MILLIGRAM(S): 0.25 INJECTION INTRAMUSCULAR; INTRAVENOUS at 05:57

## 2024-04-28 RX ADMIN — Medication 81 MILLIGRAM(S): at 12:25

## 2024-04-28 RX ADMIN — APIXABAN 2.5 MILLIGRAM(S): 2.5 TABLET, FILM COATED ORAL at 05:58

## 2024-04-28 RX ADMIN — Medication 100 MILLIGRAM(S): at 05:58

## 2024-04-28 NOTE — PHYSICAL THERAPY INITIAL EVALUATION ADULT - PERTINENT HX OF CURRENT PROBLEM, REHAB EVAL
84F w/ PMHx of HTN, HLD, DM2, HFpEF (EF 65%), CAD s/p CABG, hx of recurrent SVT, chronic Afib on Eliquis, Breast CA s/p R partial mastectomy, rectal CA s/p resection who p/w low blood sugars. Hx was obtained from the pt. and from the daughter, Shauna Almodovar, over the phone. Pt was in her typical state of health until about 3-4 days ago when she felt weak. Daughter called EMS and fingerstick on site was low into the 30s. EMS administered IV dextrose and pt. drank juice after which fingerstick improved and the pt. felt better. Pt did not wish to come to the hospital at that time. Over the next two days, the pt. had additional episodes of slipping off her bed, difficult to get up on her own. Pt herself however denies any falls. States she felt generalized weakness in the LEs that made it difficult for her to stand up. CXR: Mild pulmonary venous congestion. CTH: no acute pathology. Xray pelvis/R femur: No fx.

## 2024-04-28 NOTE — CONSULT NOTE ADULT - ASSESSMENT
84 year old female with a PMH of HTN, HLD, DM2, HFpEF (EF 65%), CAD s/p CABG, hx of recurrent SVT, chronic atrial fibrillation (on eliquis), Breast CA s/p R partial mastectomy, rectal CA s/p resection who presents with hypoglycemia. Endocrine consulted for hypoglycemia and T2DM management.    #Hypoglycemia  #T2DM  T2DM diagnosed 6-7 yrs ago  Medications: Was on oral medications until 1 year ago. Has been on insulin since then: Lantus 21 units QHS, Novolog 7 units prior to dinner.  Previous meds: Glimepiride. Ozempic but did not feel well and was discontinued.  A1c: 6.6%  eGR: 20-30s.  Complications: CAD s/p CABG, CHF. No known retinopathy, neuropathy.   Diet: Eats large "dinner" late afternoon and eats small snacks throughout the day.  SMBG: recently had frequent hypoglycemia (as noted in HPI) with sx of weakness, falling    DM strictly controlled with recent frequent hypoglycemia. Given nature of her meals and renal dysfunction and low insulin requirements, may benefit from discontinuing insulin and starting non-insulin diabetic regimen outpatient.    Inpatient plan:  - Inpatient BG goal 100-180  - continue Low dose admelog correction scale TIDAC  - continue Low dose admelog correction scale QHS  - If continues to have blood glucose >180s tomorrow, can consider starting low dose basal insulin  - CC diet    Discharge plan:  - Discharge regimen: stop basal bolus insulin. Recommend starting DPP4i (e.g. Januvia 25mg daily) and SGLT2i (jardiance 10mg daily) given CAD, CHF hx.  - Endocrine follow up: Patient prefers to continue seeing PCP because she does not want to see multiple doctors.  - Routine ophthalmology and podiatry follow up    #CKD  - check outpatient alb/cr ratio  - would benefit from SGLT2i as outlined above    Solitario Caceres MD  Endocrine Fellow  Can be reached via teams. For follow up questions, discharge recommendations, or new consults, please call answering service at [phone] (weekdays); [phone] (nights/weekends).

## 2024-04-28 NOTE — DISCHARGE NOTE PROVIDER - NSDCCPCAREPLAN_GEN_ALL_CORE_FT
PRINCIPAL DISCHARGE DIAGNOSIS  Diagnosis: Type 2 diabetes mellitus with hypoglycemia  Assessment and Plan of Treatment:       SECONDARY DISCHARGE DIAGNOSES  Diagnosis: Generalized weakness  Assessment and Plan of Treatment:     Diagnosis: Chronic atrial fibrillation  Assessment and Plan of Treatment:     Diagnosis: Chronic heart failure with preserved ejection fraction  Assessment and Plan of Treatment:      PRINCIPAL DISCHARGE DIAGNOSIS  Diagnosis: Type 2 diabetes mellitus with hypoglycemia  Assessment and Plan of Treatment: you were having low blood sugars on insulin  STOP insulin  you were evaluated by endocrinology  start JAnuvia and Jardiance which will not give you low blood sugars  follow up with endocrinology  continue consistent carb diet      SECONDARY DISCHARGE DIAGNOSES  Diagnosis: Generalized weakness  Assessment and Plan of Treatment: continue PT at home    Diagnosis: Chronic atrial fibrillation  Assessment and Plan of Treatment: continue current medications; eliquis, metoprolol    Diagnosis: Chronic heart failure with preserved ejection fraction  Assessment and Plan of Treatment: Weigh yourself daily.  If you gain 3lbs in 3 days, or 5lbs in a week call your Health Care Provider.  Do not eat or drink foods containing more than 2000mg of salt (sodium) in your diet every day.  Call your Health Care Provider if you have any swelling or increased swelling in your feet, ankles, and/or stomach.  Take all of your medication as directed.  If you become dizzy call your Health Care Provider.

## 2024-04-28 NOTE — DISCHARGE NOTE PROVIDER - NSDCFUSCHEDAPPT_GEN_ALL_CORE_FT
[facility]  CARDIOLOGY [address]  Scheduled Appointment: 07/17/2024    Ld Hagan  [facility]  CARDIOLOGY [address]  Scheduled Appointment: 07/17/2024    Katie Espinoza  [facility]  INTMED [address]  Scheduled Appointment: 07/17/2024     Katie Espinoza  [facility]  INTMED [address]  Scheduled Appointment: 05/07/2024    [facility]  CARDIOLOGY [address]  Scheduled Appointment: 07/17/2024    Ld Hagan  [facility]  CARDIOLOGY [address]  Scheduled Appointment: 07/17/2024    Katie Espinoza  [facility]  INTMED [address]  Scheduled Appointment: 07/17/2024

## 2024-04-28 NOTE — CONSULT NOTE ADULT - ATTENDING COMMENTS
84 year old female with a PMH of HTN, HLD, DM2, HFpEF, CAD. Endocrine consulted for hypoglycemia and T2DM management. Monitor on low scales as above. If glucoses uptrending >180 x at least two values start low weight based insulin. On d/c can consider PO regimen as outlined above - needs to be renally dosed with CKD. Would not start metformin with low egfr.

## 2024-04-28 NOTE — PHYSICAL THERAPY INITIAL EVALUATION ADULT - PHYSICAL ASSIST/NONPHYSICAL ASSIST: SUPINE/SIT, REHAB EVAL
"Anesthesia Transfer of Care Note    Patient: Lia Montesinos    Procedure(s) Performed: Procedure(s) (LRB):  OPEN REDUCTION INTERNAL FIXATION- DISTAL RADIUS - LEFT (Left)    Patient location: PACU    Anesthesia Type: general    Transport from OR: Transported from OR on room air with adequate spontaneous ventilation. Transported from OR on 6-10 L/min O2 by face mask with adequate spontaneous ventilation    Post pain: adequate analgesia    Post assessment: no apparent anesthetic complications and tolerated procedure well    Post vital signs: stable    Level of consciousness: awake and alert    Nausea/Vomiting: no nausea/vomiting    Complications: none    Transfer of care protocol was followed      Last vitals:   Visit Vitals  /62 (BP Location: Right arm, Patient Position: Lying)   Pulse 72   Temp 36.7 °C (98.1 °F) (Temporal)   Resp 20   Ht 5' 8" (1.727 m)   Wt 84.8 kg (187 lb)   SpO2 100%   BMI 28.43 kg/m²     "
verbal cues/1 person assist

## 2024-04-28 NOTE — DISCHARGE NOTE PROVIDER - CARE PROVIDERS DIRECT ADDRESSES
[email or web address]

## 2024-04-28 NOTE — CONSULT NOTE ADULT - SUBJECTIVE AND OBJECTIVE BOX
NOTE INCOMPLETE/ IN PROGRESS  *Please wait for attending attestation for official recommendations.     HPI:  84 year old female with a PMH of HTN, HLD, DM2, HFpEF (EF 65%), CAD s/p CABG, hx of recurrent SVT, chronic atrial fibrillation (on eliquis), Breast CA s/p R partial mastectomy, rectal CA s/p resection who presents with low blood sugars. Pt was in her typical state of health until about 3-4 days ago when she felt weak. Daughter called EMS and fingerstick on site was low into the 30s. EMS administered IV dextrose and patient drank juice after which fingerstick improved and the patient felt better. Pt did not wish to come to the hospital at that time. Over the next two days, the patient had additional episodes of slipping off her bed, difficult to get up on her own. Pt herself however denies any falls. States she felt generalized weakness in the lower extremities that made it difficult for her to stand up. She typically ambulates with a walker.  The patient takes lantus 21 units at bedtime and novolog 7 units prior to dinner. Daughter states that the patient does not eat traditional 3 meals but instead has a large dinner in late afternoon. She has smaller meals or snacks otherwise during the day. This pattern has not significantly changed in the recent past. The patient has no change in insulin dosing over the last year and until the last few days had not had issues with low blood sugars.     Consulted for: Hypoglycemia, T2DM management      History obtained from patient and from daughter over phone  [place]  ID#: [id number]    Diabetes history:  T2DM diagnosed 6-7 yrs ago  Medications: Was on oral medications until 1 year ago. Has been on insulin since then: Lantus 21 units QHS, Novolog 7 units prior to dinner.      PAST MEDICAL & SURGICAL HISTORY:  Breast CA, right  1989 s/p mastectomy ,IV Chemotherapy      HTN (hypertension)      Lymphedema of arm  right arm s/p mastectomy      Hyperlipidemia      Rectal cancer  s/p chemotherapy and  radiation 12 weeks 2/2015 -3/2015      Obese      Type II diabetes mellitus      CHF (congestive heart failure)      DVT of leg (deep venous thrombosis)  right calf DVT  2/2019 pt on Eliquis      Renal insufficiency      Gallstone      Severe pulmonary hypertension      S/P mastectomy, right  1989      History of appendectomy  1953      S/P ileostomy  low anterior resection-8/10/15, reversal of ileostomy 2016      S/P colon resection  2015      S/P TKR (total knee replacement), right  2017      S/P hip replacement, right      Herniated lumbar intervertebral disc          FAMILY HISTORY:  Family history of diabetes mellitus in mother    Family history of diabetes mellitus in son    Family history of heart attack (Child)        Social History:    Outpatient Medications:    MEDICATIONS  (STANDING):  apixaban 2.5 milliGRAM(s) Oral two times a day  aspirin  chewable 81 milliGRAM(s) Oral daily  atorvastatin 40 milliGRAM(s) Oral at bedtime  buMETAnide 1 milliGRAM(s) Oral daily  dextrose 10% Bolus 125 milliLiter(s) IV Bolus once  dextrose 5%. 1000 milliLiter(s) (50 mL/Hr) IV Continuous <Continuous>  dextrose 5%. 1000 milliLiter(s) (100 mL/Hr) IV Continuous <Continuous>  dextrose 50% Injectable 25 Gram(s) IV Push once  dextrose 50% Injectable 12.5 Gram(s) IV Push once  ferrous    sulfate 325 milliGRAM(s) Oral daily  gabapentin 200 milliGRAM(s) Oral daily  glucagon  Injectable 1 milliGRAM(s) IntraMuscular once  insulin lispro (ADMELOG) corrective regimen sliding scale   SubCutaneous three times a day before meals  insulin lispro (ADMELOG) corrective regimen sliding scale   SubCutaneous at bedtime  metoprolol tartrate 100 milliGRAM(s) Oral two times a day  pantoprazole    Tablet 40 milliGRAM(s) Oral before breakfast  spironolactone 12.5 milliGRAM(s) Oral daily    MEDICATIONS  (PRN):  acetaminophen     Tablet .. 650 milliGRAM(s) Oral every 6 hours PRN Temp greater or equal to 38C (100.4F), Mild Pain (1 - 3)  aluminum hydroxide/magnesium hydroxide/simethicone Suspension 30 milliLiter(s) Oral every 4 hours PRN Dyspepsia  dextrose Oral Gel 15 Gram(s) Oral once PRN Blood Glucose LESS THAN 70 milliGRAM(s)/deciliter  melatonin 3 milliGRAM(s) Oral at bedtime PRN Insomnia  ondansetron Injectable 4 milliGRAM(s) IV Push every 8 hours PRN Nausea and/or Vomiting      Allergies    CT scan dye (Hives)  penicillin (Unknown)    Intolerances      Review of Systems:  Constitutional: No fever  Eyes: No blurry vision  Neuro: No tremors  HEENT: No pain  Cardiovascular: No chest pain, palpitations  Respiratory: No SOB, no cough  GI: No nausea, vomiting, abdominal pain  : No dysuria  Skin: no rash  Psych: no depression  Endocrine: no polyuria, polydipsia  Hem/lymph: no swelling  Osteoporosis: no fractures    ALL OTHER SYSTEMS REVIEWED AND NEGATIVE    UNABLE TO OBTAIN    PHYSICAL EXAM:  VITALS: T(C): 36.9 (04-28-24 @ 11:36)  T(F): 98.4 (04-28-24 @ 11:36), Max: 98.4 (04-28-24 @ 11:36)  HR: 62 (04-28-24 @ 11:36) (61 - 90)  BP: 117/60 (04-28-24 @ 11:36) (117/60 - 167/81)  RR:  (18 - 18)  SpO2:  (94% - 97%)  Wt(kg): --  GENERAL: NAD, well-groomed, well-developed  EYES: No proptosis, no lid lag, anicteric  HEENT:  Atraumatic, Normocephalic, moist mucous membranes  THYROID: Normal size, no palpable nodules  RESPIRATORY: Clear to auscultation bilaterally; No rales, rhonchi, wheezing  CARDIOVASCULAR: Regular rate and rhythm; No murmurs; no peripheral edema  GI: Soft, nontender, non distended, normal bowel sounds  SKIN: Dry, intact, No rashes or lesions  MUSCULOSKELETAL: Full range of motion, normal strength  NEURO: sensation intact, extraocular movements intact, no tremor  PSYCH: Alert and oriented x 3, normal affect, normal mood  CUSHING'S SIGNS: no striae      CAPILLARY BLOOD GLUCOSE      POCT Blood Glucose.: 224 mg/dL (28 Apr 2024 12:45)  POCT Blood Glucose.: 134 mg/dL (28 Apr 2024 08:48)  POCT Blood Glucose.: 114 mg/dL (27 Apr 2024 22:02)                            12.3   6.70  )-----------( 250      ( 27 Apr 2024 09:54 )             41.5       04-28    139  |  105  |  31<H>  ----------------------------<  131<H>  5.1   |  22  |  1.61<H>    eGFR: 31<L>    Ca    9.4      04-28  Mg     2.2     04-28  Phos  3.4     04-28    TPro  7.6  /  Alb  4.1  /  TBili  0.5  /  DBili  x   /  AST  24  /  ALT  11  /  AlkPhos  103  04-26      Thyroid Function Tests:      A1C with Estimated Average Glucose Result: 6.6 % (04-27-24 @ 09:54)  A1C with Estimated Average Glucose Result: 7.1 % (07-12-23 @ 13:14)          Radiology:              NOTE INCOMPLETE/ IN PROGRESS  *Please wait for attending attestation for official recommendations.     HPI:  84 year old female with a PMH of HTN, HLD, DM2, HFpEF (EF 65%), CAD s/p CABG, hx of recurrent SVT, chronic atrial fibrillation (on eliquis), Breast CA s/p R partial mastectomy, rectal CA s/p resection who presents with low blood sugars. Pt was in her typical state of health until about 3-4 days ago when she felt weak. Daughter called EMS and fingerstick on site was low into the 30s. EMS administered IV dextrose and patient drank juice after which fingerstick improved and the patient felt better. Pt did not wish to come to the hospital at that time. Over the next two days, the patient had additional episodes of slipping off her bed, difficult to get up on her own. Pt herself however denies any falls. States she felt generalized weakness in the lower extremities that made it difficult for her to stand up. She typically ambulates with a walker.  The patient takes lantus 21 units at bedtime and novolog 7 units prior to dinner. Daughter states that the patient does not eat traditional 3 meals but instead has a large dinner in late afternoon. She has smaller meals or snacks otherwise during the day. This pattern has not significantly changed in the recent past. The patient has no change in insulin dosing over the last year and until the last few days had not had issues with low blood sugars.     Consulted for: Hypoglycemia, T2DM management      History obtained from patient and from daughter over phone  [place]  ID#: [id number]    Diabetes history:  T2DM diagnosed 6-7 yrs ago  Medications: Was on oral medications until 1 year ago. Has been on insulin since then: Lantus 21 units QHS, Novolog 7 units prior to dinner.  Previous meds: Glimepiride. Ozempic but did not feel well and was discontinued.  A1c: 6.6%  eGR: 30s.  Complications: CAD s/p CABG, CHF. No known retinopathy, neuropathy.   Diet: Eats large "dinner" late afternoon and eats small snacks throughout the day.  SMBG: recently had frequent hypoglycemia (as noted in HPI) with sx of weakness, falling      PAST MEDICAL & SURGICAL HISTORY:  Breast CA, right  1989 s/p mastectomy ,IV Chemotherapy      HTN (hypertension)      Lymphedema of arm  right arm s/p mastectomy      Hyperlipidemia      Rectal cancer  s/p chemotherapy and  radiation 12 weeks 2/2015 -3/2015      Obese      Type II diabetes mellitus      CHF (congestive heart failure)      DVT of leg (deep venous thrombosis)  right calf DVT  2/2019 pt on Eliquis      Renal insufficiency      Gallstone      Severe pulmonary hypertension      S/P mastectomy, right  1989      History of appendectomy  1953      S/P ileostomy  low anterior resection-8/10/15, reversal of ileostomy 2016      S/P colon resection  2015      S/P TKR (total knee replacement), right  2017      S/P hip replacement, right      Herniated lumbar intervertebral disc          FAMILY HISTORY:  Family history of diabetes mellitus in mother    Family history of diabetes mellitus in son    Family history of heart attack (Child)        Social History:  lives with family    Outpatient Medications:  Home Medications:  apixaban 2.5 mg oral tablet: 1 tab(s) orally every 12 hours (19 Jul 2023 08:41)  aspirin 81 mg oral tablet, chewable: 1 tab(s) orally once a day (19 Jul 2023 08:41)  atorvastatin 40 mg oral tablet: 1 tab(s) orally once a day (at bedtime) (19 Jul 2023 08:41)  Basaglar KwikPen 100 units/mL subcutaneous solution: 21 unit(s) subcutaneous once a day (at bedtime) (19 Jul 2023 08:41)  bumetanide 1 mg oral tablet: 1 tab(s) orally once a day (19 Jul 2023 08:41)  gabapentin 100 mg oral capsule: 2 cap(s) orally once a day (19 Jul 2023 08:41)  metoprolol tartrate 100 mg oral tablet: 1 tab(s) orally every 12 hours (19 Jul 2023 08:41)  NovoLOG FlexPen 100 units/mL injectable solution: 7 solution injectable once a day Once a day, prior to mid-day dinner (27 Apr 2024 11:32)  pantoprazole 40 mg oral delayed release tablet: 1 tab(s) orally 2 times a day (19 Jul 2023 08:41)    MEDICATIONS  (STANDING):  apixaban 2.5 milliGRAM(s) Oral two times a day  aspirin  chewable 81 milliGRAM(s) Oral daily  atorvastatin 40 milliGRAM(s) Oral at bedtime  buMETAnide 1 milliGRAM(s) Oral daily  dextrose 10% Bolus 125 milliLiter(s) IV Bolus once  dextrose 5%. 1000 milliLiter(s) (50 mL/Hr) IV Continuous <Continuous>  dextrose 5%. 1000 milliLiter(s) (100 mL/Hr) IV Continuous <Continuous>  dextrose 50% Injectable 25 Gram(s) IV Push once  dextrose 50% Injectable 12.5 Gram(s) IV Push once  ferrous    sulfate 325 milliGRAM(s) Oral daily  gabapentin 200 milliGRAM(s) Oral daily  glucagon  Injectable 1 milliGRAM(s) IntraMuscular once  insulin lispro (ADMELOG) corrective regimen sliding scale   SubCutaneous three times a day before meals  insulin lispro (ADMELOG) corrective regimen sliding scale   SubCutaneous at bedtime  metoprolol tartrate 100 milliGRAM(s) Oral two times a day  pantoprazole    Tablet 40 milliGRAM(s) Oral before breakfast  spironolactone 12.5 milliGRAM(s) Oral daily    MEDICATIONS  (PRN):  acetaminophen     Tablet .. 650 milliGRAM(s) Oral every 6 hours PRN Temp greater or equal to 38C (100.4F), Mild Pain (1 - 3)  aluminum hydroxide/magnesium hydroxide/simethicone Suspension 30 milliLiter(s) Oral every 4 hours PRN Dyspepsia  dextrose Oral Gel 15 Gram(s) Oral once PRN Blood Glucose LESS THAN 70 milliGRAM(s)/deciliter  melatonin 3 milliGRAM(s) Oral at bedtime PRN Insomnia  ondansetron Injectable 4 milliGRAM(s) IV Push every 8 hours PRN Nausea and/or Vomiting      Allergies    CT scan dye (Hives)  penicillin (Unknown)    Intolerances      Review of Systems:  Constitutional: No fever  Eyes: No blurry vision  Neuro: + gen weakness. No tremors  HEENT: No pain  Cardiovascular: No chest pain, palpitations  Respiratory: No SOB, no cough  GI: No nausea, vomiting, abdominal pain  : No dysuria  Skin: no rash  Psych: no depression  Endocrine: no polyuria, polydipsia  Hem/lymph: no swelling  Osteoporosis: no fractures    ALL OTHER SYSTEMS REVIEWED AND NEGATIVE      PHYSICAL EXAM:  VITALS: T(C): 36.9 (04-28-24 @ 11:36)  T(F): 98.4 (04-28-24 @ 11:36), Max: 98.4 (04-28-24 @ 11:36)  HR: 62 (04-28-24 @ 11:36) (61 - 90)  BP: 117/60 (04-28-24 @ 11:36) (117/60 - 167/81)  RR:  (18 - 18)  SpO2:  (94% - 97%)  Wt(kg): --  GENERAL: NAD, elderly female  EYES: No proptosis, no lid lag, anicteric  HEENT:  Atraumatic, Normocephalic, moist mucous membranes  RESPIRATORY: no distress  CARDIOVASCULAR: no peripheral edema  GI: non distended  SKIN: dark BLE  MUSCULOSKELETAL: CUEVAS  NEURO: no tremor  PSYCH: Alert and oriented, normal affect, normal mood  CUSHING'S SIGNS: no striae      CAPILLARY BLOOD GLUCOSE      POCT Blood Glucose.: 224 mg/dL (28 Apr 2024 12:45)  POCT Blood Glucose.: 134 mg/dL (28 Apr 2024 08:48)  POCT Blood Glucose.: 114 mg/dL (27 Apr 2024 22:02)                            12.3   6.70  )-----------( 250      ( 27 Apr 2024 09:54 )             41.5       04-28    139  |  105  |  31<H>  ----------------------------<  131<H>  5.1   |  22  |  1.61<H>    eGFR: 31<L>    Ca    9.4      04-28  Mg     2.2     04-28  Phos  3.4     04-28    TPro  7.6  /  Alb  4.1  /  TBili  0.5  /  DBili  x   /  AST  24  /  ALT  11  /  AlkPhos  103  04-26      Thyroid Function Tests:      A1C with Estimated Average Glucose Result: 6.6 % (04-27-24 @ 09:54)  A1C with Estimated Average Glucose Result: 7.1 % (07-12-23 @ 13:14)          Radiology:

## 2024-04-28 NOTE — PHYSICAL THERAPY INITIAL EVALUATION ADULT - ADDITIONAL COMMENTS
As per the pt and dtr on the phone, pt lives alone in an apt, +Ramp to enter, Ind with mobility/ADls using RW.

## 2024-04-28 NOTE — PHYSICAL THERAPY INITIAL EVALUATION ADULT - NSPTDISCHREC_GEN_A_CORE
home with assistance of family Vs HHA for mobility/ADls and home PT for safety assessment, fall prevention and to improve strength, balance and ADls/gait functions for safe return to PLOF. Pt has RW at home./Home PT

## 2024-04-28 NOTE — DISCHARGE NOTE PROVIDER - CARE PROVIDER_API CALL
Katie Espinoza  Internal Medicine  [address] [address] [place] [id number]  Phone: [phone]  Fax: [phone]  Follow Up Time: 1 week

## 2024-04-28 NOTE — DISCHARGE NOTE PROVIDER - NSDCFUADDAPPT_GEN_ALL_CORE_FT
APPTS ARE READY TO BE MADE: [x] YES    Best Family or Patient Contact (if needed):    Additional Information about above appointments (if needed):    1:   2:   3:     Other comments or requests:    APPTS ARE READY TO BE MADE: [x] YES    Best Family or Patient Contact (if needed):    Additional Information about above appointments (if needed):    1:   2:   3:     Other comments or requests:     Prior to outreaching the patient, it was visible that the patient has secured a follow up appointment which was not scheduled by our team. Patient is scheduled with Dr. Espinoza 5/7 11:15am [address]

## 2024-04-28 NOTE — DISCHARGE NOTE PROVIDER - HOSPITAL COURSE
HPI:  84 year old female with a PMH of HTN, HLD, DM2, HFpEF (EF 65%), CAD s/p CABG, hx of recurrent SVT, chronic atrial fibrillation (on eliquis), Breast CA s/p R partial mastectomy, rectal CA s/p resection who presents with low blood sugars. History was obtained from the patient and from the daughter, Shauna Almodovar, over the phone. Pt was in her typical state of health until about 3-4 days ago when she felt weak. Daughter called EMS and fingerstick on site was low into the 30s. EMS administered IV dextrose and patient drank juice after which fingerstick improved and the patient felt better. Pt did not wish to come to the hospital at that time. Over the next two days, the patient had additional episodes of slipping off her bed, difficult to get up on her own. Pt herself however denies any falls. States she felt generalized weakness in the lower extremities that made it difficult for her to stand up. She typically ambulates with a walker.     The patient takes lantus 21 units at bedtime and novolog 7 units prior to dinner. Daughter states that the patient does not eat traditional 3 meals but instead has a large dinner in late afternoon. She has smaller meals or snacks otherwise during the day. This pattern has not significantly changed in the recent past. The patient has no change in insulin dosing over the last year and until the last few days had not had issues with low blood sugars. She does not see an endocrinologist by choice as patient felt she was seeing too many doctors; her insulin is managed by her primary care physician.     In the ED, VSS. Labs remarkable for glucose of 54 (confirmed on fingerstick), BUN/Cr of 36/1.82 (baseline creatinine 1.6 - 1.7), CK wnl at 103. U/A is negative for UTI but does demonstrate microscopic hematuria.  CT head negative for acute ICH, mass effect, or fracture.  CXR: mild pulmonary venous congestion.  X ray pelvis, right femur negative for fracture. Pt is s/p right total hip arthroplasty without hardware complication.    ED interventions: acetaminophen 1000 mg IV x2, dextrose push     Hospital Course:    Patient admitted for Hypoglycemia (glucose 30s) - resolved s/p D50 in ED. Endocrine consulted for further evaluation and management. POCT testing AC/HS  	Fall/slide to ground - CTH neg for ICH; XR pelvis neg                  Generalized Weakness iso Hypoglycemia - improved                   Hx Afib - c/w Eliquis and Lopressor 100 mg PO BID                  Stage 4 CKD (Cr 1.82 on admission, at baseline) - f/u w/ outpt. Nephro     Patient is in stable condition, medically cleared for discharge       by Dr. Murphy      Important Medication Changes and Reason:      Active or Pending Issues Requiring Follow-up:  Follow up with PCP  Follow up with    Advanced Directives:   [ ] Full code  [ ] DNR  [ ] Hospice      Discharge Diagnoses:  DM2 with hypoglycemia  Generalized weakness       HPI:  84 year old female with a PMH of HTN, HLD, DM2, HFpEF (EF 65%), CAD s/p CABG, hx of recurrent SVT, chronic atrial fibrillation (on eliquis), Breast CA s/p R partial mastectomy, rectal CA s/p resection who presents with low blood sugars. History was obtained from the patient and from the daughter, Shauna Almodovar, over the phone. Pt was in her typical state of health until about 3-4 days ago when she felt weak. Daughter called EMS and fingerstick on site was low into the 30s. EMS administered IV dextrose and patient drank juice after which fingerstick improved and the patient felt better. Pt did not wish to come to the hospital at that time. Over the next two days, the patient had additional episodes of slipping off her bed, difficult to get up on her own. Pt herself however denies any falls. States she felt generalized weakness in the lower extremities that made it difficult for her to stand up. She typically ambulates with a walker.     The patient takes lantus 21 units at bedtime and novolog 7 units prior to dinner. Daughter states that the patient does not eat traditional 3 meals but instead has a large dinner in late afternoon. She has smaller meals or snacks otherwise during the day. This pattern has not significantly changed in the recent past. The patient has no change in insulin dosing over the last year and until the last few days had not had issues with low blood sugars. She does not see an endocrinologist by choice as patient felt she was seeing too many doctors; her insulin is managed by her primary care physician.     In the ED, VSS. Labs remarkable for glucose of 54 (confirmed on fingerstick), BUN/Cr of 36/1.82 (baseline creatinine 1.6 - 1.7), CK wnl at 103. U/A is negative for UTI but does demonstrate microscopic hematuria.  CT head negative for acute ICH, mass effect, or fracture.  CXR: mild pulmonary venous congestion.  X ray pelvis, right femur negative for fracture. Pt is s/p right total hip arthroplasty without hardware complication.    ED interventions: acetaminophen 1000 mg IV x2, dextrose push     Hospital Course:    Patient admitted for Hypoglycemia (glucose 30s) - resolved s/p D50 in ED. Endocrine consulted for further evaluation and management. T2DM diagnosed 6-7 yrs ago   Was on oral medications until 1 year ago. Has been on insulin since then: Lantus 21 units QHS, Novolog 7 units prior to dinner.A1c: 6.6%  recently had frequent hypoglycemia (as noted in HPI) with sx of weakness, falling. ne Given nature of her meals and renal dysfunction and low insulin requirements, advised to discontinue insulin and starting non-insulin diabetic regimen outpatient. for  inpt cont Low dose admelog correction scale TIDAC and  Low dose admelog correction scale QHS  Discharged on DPP4i (e.g. Januvia 25mg daily) and SGLT2i (jardiance 10mg daily) given CAD, CHF hx.  pt with Fall/slide to ground - CTH neg for ICH; XR pelvis neg.  Generalized Weakness iso Hypoglycemia - improved .  Hx Afib - c/w Eliquis and Lopressor 100 mg PO BID   pt with Stage 4 CKD (Cr 1.82 on admission, at baseline) - f/u w/ outpt. Nephro     Patient is in stable condition, medically cleared for discharge HOme with HOme PT            Important Medication Changes and Reason:  OFF insulin due to low BS  started januvia and jardiance for DM   Active or Pending Issues Requiring Follow-up:  Follow up with PCP  Follow up with ENdocrine    Advanced Directives:   [ x] Full code  [ ] DNR  [ ] Hospice      Discharge Diagnoses:  DM2 with hypoglycemia  Generalized weakness  CKD 4

## 2024-04-28 NOTE — DISCHARGE NOTE PROVIDER - NSDCMRMEDTOKEN_GEN_ALL_CORE_FT
apixaban 2.5 mg oral tablet: 1 tab(s) orally every 12 hours  aspirin 81 mg oral tablet, chewable: 1 tab(s) orally once a day  atorvastatin 40 mg oral tablet: 1 tab(s) orally once a day (at bedtime)  Basaglar KwikPen 100 units/mL subcutaneous solution: 21 unit(s) subcutaneous once a day (at bedtime)  bumetanide 1 mg oral tablet: 1 tab(s) orally once a day  ferrous sulfate 325 mg (65 mg elemental iron) oral tablet: 1 tab(s) orally once a day  gabapentin 100 mg oral capsule: 2 cap(s) orally once a day  metoprolol tartrate 100 mg oral tablet: 1 tab(s) orally every 12 hours  NovoLOG FlexPen 100 units/mL injectable solution: 7 solution injectable once a day Once a day, prior to mid-day dinner  pantoprazole 40 mg oral delayed release tablet: 1 tab(s) orally 2 times a day  spironolactone 25 mg oral tablet: 0.5 tab(s) orally once a day   apixaban 2.5 mg oral tablet: 1 tab(s) orally every 12 hours  aspirin 81 mg oral tablet, chewable: 1 tab(s) orally once a day  atorvastatin 40 mg oral tablet: 1 tab(s) orally once a day (at bedtime)  bumetanide 1 mg oral tablet: 1 tab(s) orally once a day  ferrous sulfate 325 mg (65 mg elemental iron) oral tablet: 1 tab(s) orally once a day  gabapentin 100 mg oral capsule: 2 cap(s) orally once a day  Januvia 25 mg oral tablet: 1 tab(s) orally once a day  Jardiance 10 mg oral tablet: 1 tab(s) orally once a day  metoprolol tartrate 100 mg oral tablet: 1 tab(s) orally every 12 hours  pantoprazole 40 mg oral delayed release tablet: 1 tab(s) orally 2 times a day  spironolactone 25 mg oral tablet: 0.5 tab(s) orally once a day

## 2024-04-29 ENCOUNTER — TRANSCRIPTION ENCOUNTER (OUTPATIENT)
Age: 84
End: 2024-04-29

## 2024-04-29 VITALS
DIASTOLIC BLOOD PRESSURE: 84 MMHG | TEMPERATURE: 97 F | HEART RATE: 73 BPM | SYSTOLIC BLOOD PRESSURE: 179 MMHG | RESPIRATION RATE: 18 BRPM | OXYGEN SATURATION: 94 %

## 2024-04-29 LAB
-  AMOXICILLIN/CLAVULANIC ACID: SIGNIFICANT CHANGE UP
-  AMPICILLIN/SULBACTAM: SIGNIFICANT CHANGE UP
-  AMPICILLIN: SIGNIFICANT CHANGE UP
-  AZTREONAM: SIGNIFICANT CHANGE UP
-  CEFAZOLIN: SIGNIFICANT CHANGE UP
-  CEFEPIME: SIGNIFICANT CHANGE UP
-  CEFTRIAXONE: SIGNIFICANT CHANGE UP
-  CEFUROXIME: SIGNIFICANT CHANGE UP
-  CIPROFLOXACIN: SIGNIFICANT CHANGE UP
-  ERTAPENEM: SIGNIFICANT CHANGE UP
-  GENTAMICIN: SIGNIFICANT CHANGE UP
-  IMIPENEM: SIGNIFICANT CHANGE UP
-  LEVOFLOXACIN: SIGNIFICANT CHANGE UP
-  MEROPENEM: SIGNIFICANT CHANGE UP
-  NITROFURANTOIN: SIGNIFICANT CHANGE UP
-  PIPERACILLIN/TAZOBACTAM: SIGNIFICANT CHANGE UP
-  TOBRAMYCIN: SIGNIFICANT CHANGE UP
-  TRIMETHOPRIM/SULFAMETHOXAZOLE: SIGNIFICANT CHANGE UP
ANION GAP SERPL CALC-SCNC: 13 MMOL/L — SIGNIFICANT CHANGE UP (ref 5–17)
BUN SERPL-MCNC: 33 MG/DL — HIGH (ref 7–23)
CALCIUM SERPL-MCNC: 9.3 MG/DL — SIGNIFICANT CHANGE UP (ref 8.4–10.5)
CHLORIDE SERPL-SCNC: 104 MMOL/L — SIGNIFICANT CHANGE UP (ref 96–108)
CO2 SERPL-SCNC: 23 MMOL/L — SIGNIFICANT CHANGE UP (ref 22–31)
CREAT SERPL-MCNC: 1.85 MG/DL — HIGH (ref 0.5–1.3)
CULTURE RESULTS: ABNORMAL
EGFR: 26 ML/MIN/1.73M2 — LOW
GLUCOSE BLDC GLUCOMTR-MCNC: 142 MG/DL — HIGH (ref 70–99)
GLUCOSE BLDC GLUCOMTR-MCNC: 157 MG/DL — HIGH (ref 70–99)
GLUCOSE SERPL-MCNC: 151 MG/DL — HIGH (ref 70–99)
HCT VFR BLD CALC: 40 % — SIGNIFICANT CHANGE UP (ref 34.5–45)
HGB BLD-MCNC: 12 G/DL — SIGNIFICANT CHANGE UP (ref 11.5–15.5)
MCHC RBC-ENTMCNC: 28.8 PG — SIGNIFICANT CHANGE UP (ref 27–34)
MCHC RBC-ENTMCNC: 30 GM/DL — LOW (ref 32–36)
MCV RBC AUTO: 95.9 FL — SIGNIFICANT CHANGE UP (ref 80–100)
METHOD TYPE: SIGNIFICANT CHANGE UP
NRBC # BLD: 0 /100 WBCS — SIGNIFICANT CHANGE UP (ref 0–0)
ORGANISM # SPEC MICROSCOPIC CNT: ABNORMAL
ORGANISM # SPEC MICROSCOPIC CNT: ABNORMAL
PLATELET # BLD AUTO: 259 K/UL — SIGNIFICANT CHANGE UP (ref 150–400)
POTASSIUM SERPL-MCNC: 4.2 MMOL/L — SIGNIFICANT CHANGE UP (ref 3.5–5.3)
POTASSIUM SERPL-SCNC: 4.2 MMOL/L — SIGNIFICANT CHANGE UP (ref 3.5–5.3)
RBC # BLD: 4.17 M/UL — SIGNIFICANT CHANGE UP (ref 3.8–5.2)
RBC # FLD: 15.3 % — HIGH (ref 10.3–14.5)
SODIUM SERPL-SCNC: 140 MMOL/L — SIGNIFICANT CHANGE UP (ref 135–145)
SPECIMEN SOURCE: SIGNIFICANT CHANGE UP
WBC # BLD: 6.15 K/UL — SIGNIFICANT CHANGE UP (ref 3.8–10.5)
WBC # FLD AUTO: 6.15 K/UL — SIGNIFICANT CHANGE UP (ref 3.8–10.5)

## 2024-04-29 PROCEDURE — 85025 COMPLETE CBC W/AUTO DIFF WBC: CPT

## 2024-04-29 PROCEDURE — 71045 X-RAY EXAM CHEST 1 VIEW: CPT

## 2024-04-29 PROCEDURE — 84295 ASSAY OF SERUM SODIUM: CPT

## 2024-04-29 PROCEDURE — 80048 BASIC METABOLIC PNL TOTAL CA: CPT

## 2024-04-29 PROCEDURE — 82962 GLUCOSE BLOOD TEST: CPT

## 2024-04-29 PROCEDURE — 85014 HEMATOCRIT: CPT

## 2024-04-29 PROCEDURE — 82947 ASSAY GLUCOSE BLOOD QUANT: CPT

## 2024-04-29 PROCEDURE — 83735 ASSAY OF MAGNESIUM: CPT

## 2024-04-29 PROCEDURE — 82550 ASSAY OF CK (CPK): CPT

## 2024-04-29 PROCEDURE — 73552 X-RAY EXAM OF FEMUR 2/>: CPT

## 2024-04-29 PROCEDURE — 84132 ASSAY OF SERUM POTASSIUM: CPT

## 2024-04-29 PROCEDURE — 85027 COMPLETE CBC AUTOMATED: CPT

## 2024-04-29 PROCEDURE — 82435 ASSAY OF BLOOD CHLORIDE: CPT

## 2024-04-29 PROCEDURE — 84484 ASSAY OF TROPONIN QUANT: CPT

## 2024-04-29 PROCEDURE — 82010 KETONE BODYS QUAN: CPT

## 2024-04-29 PROCEDURE — 80053 COMPREHEN METABOLIC PANEL: CPT

## 2024-04-29 PROCEDURE — 83605 ASSAY OF LACTIC ACID: CPT

## 2024-04-29 PROCEDURE — 70450 CT HEAD/BRAIN W/O DYE: CPT | Mod: MC

## 2024-04-29 PROCEDURE — 99232 SBSQ HOSP IP/OBS MODERATE 35: CPT

## 2024-04-29 PROCEDURE — 99285 EMERGENCY DEPT VISIT HI MDM: CPT | Mod: 25

## 2024-04-29 PROCEDURE — 82803 BLOOD GASES ANY COMBINATION: CPT

## 2024-04-29 PROCEDURE — 87186 SC STD MICRODIL/AGAR DIL: CPT

## 2024-04-29 PROCEDURE — 85018 HEMOGLOBIN: CPT

## 2024-04-29 PROCEDURE — 93005 ELECTROCARDIOGRAM TRACING: CPT

## 2024-04-29 PROCEDURE — 82553 CREATINE MB FRACTION: CPT

## 2024-04-29 PROCEDURE — 99239 HOSP IP/OBS DSCHRG MGMT >30: CPT

## 2024-04-29 PROCEDURE — 81001 URINALYSIS AUTO W/SCOPE: CPT

## 2024-04-29 PROCEDURE — 83036 HEMOGLOBIN GLYCOSYLATED A1C: CPT

## 2024-04-29 PROCEDURE — 87086 URINE CULTURE/COLONY COUNT: CPT

## 2024-04-29 PROCEDURE — 83880 ASSAY OF NATRIURETIC PEPTIDE: CPT

## 2024-04-29 PROCEDURE — 82330 ASSAY OF CALCIUM: CPT

## 2024-04-29 PROCEDURE — 72170 X-RAY EXAM OF PELVIS: CPT

## 2024-04-29 PROCEDURE — 84100 ASSAY OF PHOSPHORUS: CPT

## 2024-04-29 PROCEDURE — 36415 COLL VENOUS BLD VENIPUNCTURE: CPT

## 2024-04-29 PROCEDURE — 97162 PT EVAL MOD COMPLEX 30 MIN: CPT

## 2024-04-29 RX ORDER — EMPAGLIFLOZIN 10 MG/1
1 TABLET, FILM COATED ORAL
Qty: 30 | Refills: 0
Start: 2024-04-29 | End: 2024-05-28

## 2024-04-29 RX ORDER — INSULIN GLARGINE 100 [IU]/ML
21 INJECTION, SOLUTION SUBCUTANEOUS
Refills: 0 | DISCHARGE

## 2024-04-29 RX ORDER — INSULIN ASPART 100 [IU]/ML
7 INJECTION, SOLUTION SUBCUTANEOUS
Refills: 0 | DISCHARGE

## 2024-04-29 RX ORDER — SITAGLIPTIN 50 MG/1
1 TABLET, FILM COATED ORAL
Qty: 30 | Refills: 0
Start: 2024-04-29 | End: 2024-05-28

## 2024-04-29 RX ADMIN — Medication 1: at 09:17

## 2024-04-29 RX ADMIN — GABAPENTIN 200 MILLIGRAM(S): 400 CAPSULE ORAL at 12:19

## 2024-04-29 RX ADMIN — Medication 100 MILLIGRAM(S): at 06:19

## 2024-04-29 RX ADMIN — PANTOPRAZOLE SODIUM 40 MILLIGRAM(S): 20 TABLET, DELAYED RELEASE ORAL at 06:19

## 2024-04-29 RX ADMIN — Medication 81 MILLIGRAM(S): at 12:21

## 2024-04-29 RX ADMIN — Medication 650 MILLIGRAM(S): at 06:51

## 2024-04-29 RX ADMIN — Medication 650 MILLIGRAM(S): at 06:19

## 2024-04-29 RX ADMIN — BUMETANIDE 1 MILLIGRAM(S): 0.25 INJECTION INTRAMUSCULAR; INTRAVENOUS at 06:19

## 2024-04-29 RX ADMIN — SPIRONOLACTONE 12.5 MILLIGRAM(S): 25 TABLET, FILM COATED ORAL at 06:20

## 2024-04-29 RX ADMIN — APIXABAN 2.5 MILLIGRAM(S): 2.5 TABLET, FILM COATED ORAL at 06:19

## 2024-04-29 RX ADMIN — Medication 325 MILLIGRAM(S): at 12:19

## 2024-04-29 NOTE — DISCHARGE NOTE NURSING/CASE MANAGEMENT/SOCIAL WORK - NSDCPEELIQUISREACT_GEN_ALL_CORE
Unknown if ever smoked Apixaban/Eliquis increases your risk for bleeding. Notify your doctor if you experience any of the following side effects: bleeding, coughing or vomiting blood, red or black stool, unexpected pain or swelling, itching or hives, chest pain, chest tightness, trouble breathing, changes in how much or how often you urinate, red or pink urine, numbness or tingling in your feet, or unusual muscle weakness. When Apixaban/Eliquis is taken with other medicines, they can affect how it works. Taking other medications such as aspirin, blood thinners, nonsteroidal anti-inflammatories, and medications that treat depression can increase your risk of bleeding. It is very important to tell your health care provider about all of the other medicines, including over-the-counter medications, herbs, and vitamins you are taking. DO NOT start, stop, or change the dosage of any medicine, including over-the-counter medicines, vitamins, and herbal products without your doctor’s approval. Any products containing aspirin or are nonsteroidal anti-inflammatories lessen the blood’s ability to form clots and add to the effect of Apixaban/Eliquis. Never take aspirin or medicines that contain aspirin without speaking to your doctor.

## 2024-04-29 NOTE — CONSULT NOTE ADULT - SUBJECTIVE AND OBJECTIVE BOX
HPI: Ms. Batista is an 84 year-old Ukrainian-speaking woman with history of multiple medical issues including hypertension, type 2 diabetes mellitus, breast cancer s/p right mastectomy/chemo, rectal cancer s/p resection/chemo/RT, congestive heart failure with preserved EF, and chronic kidney disease. She is well-known to my partner Dr. Igor Kan. She presented on 4/26/24 to the [facility] ER with generalized weakness for 3-4 days and hypoglycemia.       PAST MEDICAL & SURGICAL HISTORY:  HTN  HLD  DM2  Breast CA - R mastectomy 1989, Chemo  Rectal CA - resection+chemo+RT 2015; ileostomy reversal 2016  CKD  HFpEF  Pulmonary hypertension  DVT RLE 2019 - on Eliquis  Appendectomy 1953  R TKR 2017  R THR    Allergies  CT scan dye (Hives)  penicillin (Unknown)    SOCIAL HISTORY:  Denies ETOh,Smoking,     FAMILY HISTORY:  Family history of diabetes mellitus in mother  Family history of diabetes mellitus in son  Family history of heart attack (Child)    REVIEW OF SYSTEMS:  CONSTITUTIONAL: (+)generalized weakness; no fevers or chills  EYES/ENT: No visual changes;  No vertigo or throat pain   NECK: No pain or stiffness  RESPIRATORY: No cough, wheezing, hemoptysis; No shortness of breath  CARDIOVASCULAR: No chest pain or palpitations  GASTROINTESTINAL: No abdominal or epigastric pain. No nausea, vomiting, or hematemesis; No diarrhea or constipation. No melena or hematochezia.  GENITOURINARY: No dysuria, frequency or hematuria  NEUROLOGICAL: No numbness or tingling  SKIN: No itching, burning, rashes, or lesions   All other review of systems is negative unless indicated above.    VITAL:  T(C): , Max: 36.9 (04-28-24 @ 11:36)  T(F): , Max: 98.4 (04-28-24 @ 11:36)  HR: 86 (04-29-24 @ 04:15)  BP: 139/78 (04-29-24 @ 04:15)  RR: 18 (04-29-24 @ 04:15)  SpO2: 96% (04-29-24 @ 04:15)  Wt(kg): --    PHYSICAL EXAM:  Constitutional: NAD, Alert  HEENT: NCAT, MMM  Neck: Supple, No JVD  Respiratory: CTA-b/l  Cardiovascular: RRR s1s2, no m/r/g  Gastrointestinal: BS+, soft, NT/ND  Extremities: No peripheral edema b/l  Neurological: no focal deficits; strength grossly intact  Back: no CVAT b/l  Skin: No rashes, no nevi    LABS:                        12.0   6.15  )-----------( 259      ( 29 Apr 2024 07:17 )             40.0     Na(140)/K(4.2)/Cl(104)/HCO3(23)/BUN(33)/Cr(1.85)Glu(151)/Ca(9.3)/Mg(--)/PO4(--)    04-29 @ 07:19  Na(139)/K(5.1)/Cl(105)/HCO3(22)/BUN(31)/Cr(1.61)Glu(131)/Ca(9.4)/Mg(2.2)/PO4(3.4)    04-28 @ 06:31  Na(139)/K(4.2)/Cl(103)/HCO3(26)/BUN(32)/Cr(1.74)Glu(112)/Ca(9.4)/Mg(--)/PO4(--)    04-27 @ 09:54  Na(141)/K(4.0)/Cl(105)/HCO3(26)/BUN(33)/Cr(1.77)Glu(113)/Ca(9.0)/Mg(--)/PO4(--)    04-27 @ 06:48  Na(143)/K(5.4)/Cl(104)/HCO3(25)/BUN(36)/Cr(1.82)Glu(54)/Ca(9.6)/Mg(2.1)/PO4(--)    04-26 @ 13:24    (4/26/24) - UA - 30prot, 3wbc, 25rbc    (7/20/23) - BUN 33, Cr 1.75      IMAGING:  < from: CT Head No Cont (04.26.24 @ 21:16) >  No CT evidence of acute intracranial hemorrhage, mass effect, or acute   osseous fracture.    < from: Xray Chest 1 View- PORTABLE-Urgent (04.26.24 @ 14:10) >  Mild pulmonary venous congestion.  Stable cardiomegaly.    < from: TTE W or WO Ultrasound Enhancing Agent (05.06.23 @ 07:21) >   1. Normal left ventricular cavity size. The left ventricular wall thickness is normal. The left ventricular systolic function is normal with an ejection fraction of 62 % by 3D. There are no regional wall motion abnormalities seen.   2. There is normal left ventricular diastolic function.   3. Normal right ventricular cavity size and normal systolic function. The tricuspid annular plane systolic excursion (TAPSE) is 0.9 cm (normal >=1.7 cm).   4. Moderate pulmonary hypertension. Pulmonary artery systolic pressure 56 mmHg.        ASSESSMENT:  (1)Renal - stable CKD3  (2)Hypoglycemia - improved    RECOMMEND:  (1)Meds as ordered  (2)D/C planning per primary team; can f/u with my partner Dr. Igor Kan Wed 7/31/24 at 1240p as previously scheduled    Thank you for involving [facility] in this patient's care.    With warm regards,    Ignacio Morataya MD   [facility]  Office: [phone]  Cell: [phone]             HPI: Ms. Batista is an 84 year-old Maltese-speaking woman with history of multiple medical issues including hypertension, type 2 diabetes mellitus, breast cancer s/p right mastectomy/chemo, rectal cancer s/p resection/chemo/RT, congestive heart failure with preserved EF, and chronic kidney disease. She is well-known to my partner Dr. Igor Kan. She presented on 4/26/24 to the [facility] ER with generalized weakness for 3-4 days and hypoglycemia.     Ms. Batista generally feels well and has no complaints      PAST MEDICAL & SURGICAL HISTORY:  HTN  HLD  DM2  Breast CA - R mastectomy 1989, Chemo  Rectal CA - resection+chemo+RT 2015; ileostomy reversal 2016  CKD  HFpEF  Pulmonary hypertension  DVT RLE 2019 - on Eliquis  Appendectomy 1953  R TKR 2017  R THR    Allergies  CT scan dye (Hives)  penicillin (Unknown)    SOCIAL HISTORY:  Denies ETOh,Smoking,     FAMILY HISTORY:  Family history of diabetes mellitus in mother  Family history of diabetes mellitus in son  Family history of heart attack (Child)    REVIEW OF SYSTEMS:  CONSTITUTIONAL: (+)generalized weakness; no fevers or chills  EYES/ENT: No visual changes;  No vertigo or throat pain   NECK: No pain or stiffness  RESPIRATORY: No cough, wheezing, hemoptysis; No shortness of breath  CARDIOVASCULAR: No chest pain or palpitations  GASTROINTESTINAL: No abdominal or epigastric pain. No nausea, vomiting, or hematemesis; No diarrhea or constipation. No melena or hematochezia.  GENITOURINARY: No dysuria, frequency or hematuria  NEUROLOGICAL: No numbness or tingling  SKIN: No itching, burning, rashes, or lesions   All other review of systems is negative unless indicated above.    VITAL:  T(C): , Max: 36.9 (04-28-24 @ 11:36)  T(F): , Max: 98.4 (04-28-24 @ 11:36)  HR: 86 (04-29-24 @ 04:15)  BP: 139/78 (04-29-24 @ 04:15)  RR: 18 (04-29-24 @ 04:15)  SpO2: 96% (04-29-24 @ 04:15)  Wt(kg): --    PHYSICAL EXAM:  Constitutional: NAD, Alert  HEENT: NCAT, MMM  Neck: Supple, No JVD  Respiratory: CTA-b/l  Cardiovascular: RRR s1s2, no m/r/g  Gastrointestinal: BS+, soft, NT/ND  Extremities: No peripheral edema b/l  Neurological: no focal deficits; strength grossly intact  Back: no CVAT b/l  Skin: No rashes, no nevi    LABS:                        12.0   6.15  )-----------( 259      ( 29 Apr 2024 07:17 )             40.0     Na(140)/K(4.2)/Cl(104)/HCO3(23)/BUN(33)/Cr(1.85)Glu(151)/Ca(9.3)/Mg(--)/PO4(--)    04-29 @ 07:19  Na(139)/K(5.1)/Cl(105)/HCO3(22)/BUN(31)/Cr(1.61)Glu(131)/Ca(9.4)/Mg(2.2)/PO4(3.4)    04-28 @ 06:31  Na(139)/K(4.2)/Cl(103)/HCO3(26)/BUN(32)/Cr(1.74)Glu(112)/Ca(9.4)/Mg(--)/PO4(--)    04-27 @ 09:54  Na(141)/K(4.0)/Cl(105)/HCO3(26)/BUN(33)/Cr(1.77)Glu(113)/Ca(9.0)/Mg(--)/PO4(--)    04-27 @ 06:48  Na(143)/K(5.4)/Cl(104)/HCO3(25)/BUN(36)/Cr(1.82)Glu(54)/Ca(9.6)/Mg(2.1)/PO4(--)    04-26 @ 13:24    (4/26/24) - UA - 30prot, 3wbc, 25rbc    (7/20/23) - BUN 33, Cr 1.75      IMAGING:  < from: CT Head No Cont (04.26.24 @ 21:16) >  No CT evidence of acute intracranial hemorrhage, mass effect, or acute   osseous fracture.    < from: Xray Chest 1 View- PORTABLE-Urgent (04.26.24 @ 14:10) >  Mild pulmonary venous congestion.  Stable cardiomegaly.    < from: TTE W or WO Ultrasound Enhancing Agent (05.06.23 @ 07:21) >   1. Normal left ventricular cavity size. The left ventricular wall thickness is normal. The left ventricular systolic function is normal with an ejection fraction of 62 % by 3D. There are no regional wall motion abnormalities seen.   2. There is normal left ventricular diastolic function.   3. Normal right ventricular cavity size and normal systolic function. The tricuspid annular plane systolic excursion (TAPSE) is 0.9 cm (normal >=1.7 cm).   4. Moderate pulmonary hypertension. Pulmonary artery systolic pressure 56 mmHg.        ASSESSMENT:  (1)Renal - stable CKD3  (2)Hypoglycemia - improved    RECOMMEND:  (1)Meds as ordered  (2)D/C planning per primary team; can f/u with my partner Dr. Igor Kan Wed 7/31/24 at 1240p as previously scheduled    Thank you for involving [facility] in this patient's care.    With warm regards,    Ignacio Morataya MD   [facility]  Office: [phone]  Cell: [phone]

## 2024-04-29 NOTE — PROGRESS NOTE ADULT - PROBLEM SELECTOR PLAN 6
- C/w home spironolactone  - C/w home metoprolol  - C/w home Bumex
- C/w home spironolactone  - C/w home metoprolol  - C/w home Bumex

## 2024-04-29 NOTE — DISCHARGE NOTE NURSING/CASE MANAGEMENT/SOCIAL WORK - NSDCPEPTCAREGIVEDUMATLIST _GEN_ALL_CORE
Heart Failure/Diabetes/Influenza Vaccination/Apixaban/Eliquis Heart Failure/Diabetes/Influenza Vaccination

## 2024-04-29 NOTE — PROGRESS NOTE ADULT - SUBJECTIVE AND OBJECTIVE BOX
seen earlier today     Chief Complaint: Diabetes Mellitus follow up    INTERVAL HX:  Patient seen at bedside with daughter present and acting as . patient is feeling better and will be discharged home today.   BG reviewed over 24hrs has been variable ranging from 134-224mg/dl.     Review of Systems:  General: As above  GI: No nausea, vomiting  Endocrine: no  S&Sx of hypoglycemia    Allergies    CT scan dye (Hives)  penicillin (Unknown)    Intolerances      MEDICATIONS  (STANDING):  apixaban 2.5 milliGRAM(s) Oral two times a day  aspirin  chewable 81 milliGRAM(s) Oral daily  atorvastatin 40 milliGRAM(s) Oral at bedtime  buMETAnide 1 milliGRAM(s) Oral daily  dextrose 10% Bolus 125 milliLiter(s) IV Bolus once  dextrose 5%. 1000 milliLiter(s) (50 mL/Hr) IV Continuous <Continuous>  dextrose 5%. 1000 milliLiter(s) (100 mL/Hr) IV Continuous <Continuous>  dextrose 50% Injectable 12.5 Gram(s) IV Push once  dextrose 50% Injectable 25 Gram(s) IV Push once  ferrous    sulfate 325 milliGRAM(s) Oral daily  gabapentin 200 milliGRAM(s) Oral daily  glucagon  Injectable 1 milliGRAM(s) IntraMuscular once  insulin lispro (ADMELOG) corrective regimen sliding scale   SubCutaneous three times a day before meals  insulin lispro (ADMELOG) corrective regimen sliding scale   SubCutaneous at bedtime  metoprolol tartrate 100 milliGRAM(s) Oral two times a day  pantoprazole    Tablet 40 milliGRAM(s) Oral before breakfast  spironolactone 12.5 milliGRAM(s) Oral daily      atorvastatin   40 milliGRAM(s) Oral (04-28-24 @ 21:06)    insulin lispro (ADMELOG) corrective regimen sliding scale   1 Unit(s) SubCutaneous (04-29-24 @ 09:17)   1  SubCutaneous (04-28-24 @ 18:15)        PHYSICAL EXAM:  VITALS: T(C): 36.2 (04-29-24 @ 12:59)  T(F): 97.2 (04-29-24 @ 12:59), Max: 97.7 (04-28-24 @ 20:54)  HR: 73 (04-29-24 @ 12:59) (73 - 86)  BP: 179/84 (04-29-24 @ 12:59) (139/78 - 179/84)  RR:  (18 - 18)  SpO2:  (94% - 96%)  Wt(kg): --  GENERAL: NAD  Respiratory: Respirations unlabored   Extremities: Warm, no edema  NEURO: Alert , appropriate     LABS:  POCT Blood Glucose.: 142 mg/dL (04-29-24 @ 12:43)  POCT Blood Glucose.: 157 mg/dL (04-29-24 @ 09:00)  POCT Blood Glucose.: 219 mg/dL (04-28-24 @ 21:27)  POCT Blood Glucose.: 187 mg/dL (04-28-24 @ 17:35)  POCT Blood Glucose.: 224 mg/dL (04-28-24 @ 12:45)  POCT Blood Glucose.: 134 mg/dL (04-28-24 @ 08:48)  POCT Blood Glucose.: 114 mg/dL (04-27-24 @ 22:02)  POCT Blood Glucose.: 214 mg/dL (04-27-24 @ 15:37)  POCT Blood Glucose.: 99 mg/dL (04-27-24 @ 08:54)  POCT Blood Glucose.: 122 mg/dL (04-27-24 @ 06:08)  POCT Blood Glucose.: 131 mg/dL (04-27-24 @ 01:24)  POCT Blood Glucose.: 149 mg/dL (04-26-24 @ 23:28)  POCT Blood Glucose.: 227 mg/dL (04-26-24 @ 17:20)  POCT Blood Glucose.: 82 mg/dL (04-26-24 @ 15:12)  POCT Blood Glucose.: 39 mg/dL (04-26-24 @ 14:14)  POCT Blood Glucose.: 39 mg/dL (04-26-24 @ 14:13)                          12.0   6.15  )-----------( 259      ( 29 Apr 2024 07:17 )             40.0     04-29    140  |  104  |  33<H>  ----------------------------<  151<H>  4.2   |  23  |  1.85<H>    Ca    9.3      29 Apr 2024 07:19  Phos  3.4     04-28  Mg     2.2     04-28      eGFR: 26 mL/min/1.73m2 (29 Apr 2024 07:19)      Thyroid Function Tests:      A1C with Estimated Average Glucose Result: 6.6 % (04-27-24 @ 09:54)    Estimated Average Glucose: 143 mg/dL (04-27-24 @ 09:54)        Diet, Regular:   Consistent Carbohydrate No Snacks (CSTCHO)  DASH/TLC Sodium & Cholesterol Restricted (DASH) (04-27-24 @ 10:23) [Active]            
Kaz Metzger MD  Division of Hospital Medicine  Available on MS teams until 7pm  If no response or off-hours, page [phone]  -------------------------------------    Patient is a 84y old  Female who presents with a chief complaint of Generalized weakness in the setting of hypoglycemia (29 Apr 2024 08:57)    SUBJECTIVE / OVERNIGHT EVENTS: none acute  ADDITIONAL REVIEW OF SYSTEMS: pt feels ok, no new complaints. Daughter provided Vietnamese translation per patient request.     MEDICATIONS  (STANDING):  apixaban 2.5 milliGRAM(s) Oral two times a day  aspirin  chewable 81 milliGRAM(s) Oral daily  atorvastatin 40 milliGRAM(s) Oral at bedtime  buMETAnide 1 milliGRAM(s) Oral daily  dextrose 10% Bolus 125 milliLiter(s) IV Bolus once  dextrose 5%. 1000 milliLiter(s) (50 mL/Hr) IV Continuous <Continuous>  dextrose 5%. 1000 milliLiter(s) (100 mL/Hr) IV Continuous <Continuous>  dextrose 50% Injectable 25 Gram(s) IV Push once  dextrose 50% Injectable 12.5 Gram(s) IV Push once  ferrous    sulfate 325 milliGRAM(s) Oral daily  gabapentin 200 milliGRAM(s) Oral daily  glucagon  Injectable 1 milliGRAM(s) IntraMuscular once  insulin lispro (ADMELOG) corrective regimen sliding scale   SubCutaneous three times a day before meals  insulin lispro (ADMELOG) corrective regimen sliding scale   SubCutaneous at bedtime  metoprolol tartrate 100 milliGRAM(s) Oral two times a day  pantoprazole    Tablet 40 milliGRAM(s) Oral before breakfast  spironolactone 12.5 milliGRAM(s) Oral daily    MEDICATIONS  (PRN):  acetaminophen     Tablet .. 650 milliGRAM(s) Oral every 6 hours PRN Temp greater or equal to 38C (100.4F), Mild Pain (1 - 3)  aluminum hydroxide/magnesium hydroxide/simethicone Suspension 30 milliLiter(s) Oral every 4 hours PRN Dyspepsia  dextrose Oral Gel 15 Gram(s) Oral once PRN Blood Glucose LESS THAN 70 milliGRAM(s)/deciliter  melatonin 3 milliGRAM(s) Oral at bedtime PRN Insomnia  ondansetron Injectable 4 milliGRAM(s) IV Push every 8 hours PRN Nausea and/or Vomiting      CAPILLARY BLOOD GLUCOSE      POCT Blood Glucose.: 157 mg/dL (29 Apr 2024 09:00)  POCT Blood Glucose.: 219 mg/dL (28 Apr 2024 21:27)  POCT Blood Glucose.: 187 mg/dL (28 Apr 2024 17:35)  POCT Blood Glucose.: 224 mg/dL (28 Apr 2024 12:45)    I&O's Summary    28 Apr 2024 07:01  -  29 Apr 2024 07:00  --------------------------------------------------------  IN: 720 mL / OUT: 100 mL / NET: 620 mL        PHYSICAL EXAM:  Vital Signs Last 24 Hrs  T(C): 36.5 (29 Apr 2024 04:15), Max: 36.5 (28 Apr 2024 20:54)  T(F): 97.7 (29 Apr 2024 04:15), Max: 97.7 (28 Apr 2024 20:54)  HR: 86 (29 Apr 2024 04:15) (86 - 86)  BP: 139/78 (29 Apr 2024 04:15) (139/78 - 156/89)  BP(mean): --  RR: 18 (29 Apr 2024 04:15) (18 - 18)  SpO2: 96% (29 Apr 2024 04:15) (96% - 96%)    Parameters below as of 29 Apr 2024 04:15  Patient On (Oxygen Delivery Method): room air      CONSTITUTIONAL: NAD  EYES: PERRLA; conjunctiva and sclera clear  ENMT: MMM  NECK: Supple  RESPIRATORY: Normal respiratory effort; CTAB  CARDIOVASCULAR: RRR, no JVD, no peripheral edema   ABDOMEN: Nontender to palpation, normoactive BS, no guarding/rigidity  MUSCLOSKELETAL:  no clubbing/cyanosis, no joint swelling or tenderness to palpation  PSYCH: A+O x 3, affect normal  NEUROLOGY: CN 2-12 are intact and symmetric; no gross sensory or motor deficits  SKIN: No rashes; no palpable lesions    LABS:                        12.0   6.15  )-----------( 259      ( 29 Apr 2024 07:17 )             40.0     04-29    140  |  104  |  33<H>  ----------------------------<  151<H>  4.2   |  23  |  1.85<H>    Ca    9.3      29 Apr 2024 07:19  Phos  3.4     04-28  Mg     2.2     04-28            Urinalysis Basic - ( 29 Apr 2024 07:19 )    Color: x / Appearance: x / SG: x / pH: x  Gluc: 151 mg/dL / Ketone: x  / Bili: x / Urobili: x   Blood: x / Protein: x / Nitrite: x   Leuk Esterase: x / RBC: x / WBC x   Sq Epi: x / Non Sq Epi: x / Bacteria: x        Culture - Urine (collected 26 Apr 2024 14:11)  Source: Clean Catch Clean Catch (Midstream)  Preliminary Report (28 Apr 2024 13:07):    10,000 - 49,000 CFU/mL Escherichia coli        RADIOLOGY & ADDITIONAL TESTS:  Results Reviewed:   Imaging Personally Reviewed:  Electrocardiogram Personally Reviewed:    COORDINATION OF CARE:  Care Discussed with Consultants/Other Providers [Y/N]: endocrinology  Prior or Outpatient Records Reviewed [Y/N]:  
Contact Information:  Carla Murphy II, MD, MPH  Internal Medicine    NURIA GARCIA, [id number]    Patient is a 84y old  Female who presents with a chief complaint of Generalized weakness in the setting of hypoglycemia (28 Apr 2024 17:26)      OVERNIGHT EVENTS/INTERVAL/SUBJECTIVE: Patient evaluated at bedside, discussed with daughter on the phone - patient states that she feels much better and she does not have any complaints at this time.    CONSTITUTIONAL: No weakness. No fatigue. No fever.  HEAD: No head trauma.   EYES: No vision changes.  ENT: No hearing changes or tinnitus. No ear pain. No changes in smell. No nasal congestion or discharge. No sore throat. No voice hoarseness.   NECK: No neck pain or stiffness. No lumps.  RESPIRATORY: No cough. No SOB. No wheezing. No hemoptysis.   CARDIOVASCULAR: No chest pain. No palpitations.   GASTROINTESTINAL: No dysphagia. No ABD pain. No distension. No constipation. No diarrhea. No pain with defecation. No hematemesis. No hematochezia or melena.  BACK: No back pain.  GENITOURINARY: No dysuria. No frequency or urgency. No hesitancy. No incontinence. No urinary retention. No suprapubic pain. No hematuria.  EXTREMITY: No swelling.  MUSCULOSKELETAL: No joint pain or swelling. No fractures. No stiffness.    SKIN: No rashes. No itching. No skin, hair, or nail changes.  NEUROLOGICAL: No weakness or paralysis. No lightheadedness or dizziness. No HA. No numbness or tingling.   PSYCHIATRIC: No depression.       OBJECTIVE:  Vital Signs Last 24 Hrs  T(C): 36.9 (28 Apr 2024 11:36), Max: 36.9 (28 Apr 2024 11:36)  T(F): 98.4 (28 Apr 2024 11:36), Max: 98.4 (28 Apr 2024 11:36)  HR: 62 (28 Apr 2024 11:36) (61 - 90)  BP: 117/60 (28 Apr 2024 11:36) (117/60 - 167/81)  BP(mean): --  RR: 18 (28 Apr 2024 11:36) (18 - 18)  SpO2: 94% (28 Apr 2024 11:36) (94% - 97%)    Parameters below as of 28 Apr 2024 11:36  Patient On (Oxygen Delivery Method): room air      I&O's Summary    27 Apr 2024 07:01  -  28 Apr 2024 07:00  --------------------------------------------------------  IN: 240 mL / OUT: 700 mL / NET: -460 mL    28 Apr 2024 07:01  -  28 Apr 2024 21:02  --------------------------------------------------------  IN: 360 mL / OUT: 0 mL / NET: 360 mL        MEDICATIONS  (STANDING):  apixaban 2.5 milliGRAM(s) Oral two times a day  aspirin  chewable 81 milliGRAM(s) Oral daily  atorvastatin 40 milliGRAM(s) Oral at bedtime  buMETAnide 1 milliGRAM(s) Oral daily  dextrose 10% Bolus 125 milliLiter(s) IV Bolus once  dextrose 5%. 1000 milliLiter(s) (100 mL/Hr) IV Continuous <Continuous>  dextrose 5%. 1000 milliLiter(s) (50 mL/Hr) IV Continuous <Continuous>  dextrose 50% Injectable 25 Gram(s) IV Push once  dextrose 50% Injectable 12.5 Gram(s) IV Push once  ferrous    sulfate 325 milliGRAM(s) Oral daily  gabapentin 200 milliGRAM(s) Oral daily  glucagon  Injectable 1 milliGRAM(s) IntraMuscular once  insulin lispro (ADMELOG) corrective regimen sliding scale   SubCutaneous three times a day before meals  insulin lispro (ADMELOG) corrective regimen sliding scale   SubCutaneous at bedtime  metoprolol tartrate 100 milliGRAM(s) Oral two times a day  pantoprazole    Tablet 40 milliGRAM(s) Oral before breakfast  spironolactone 12.5 milliGRAM(s) Oral daily    MEDICATIONS  (PRN):  acetaminophen     Tablet .. 650 milliGRAM(s) Oral every 6 hours PRN Temp greater or equal to 38C (100.4F), Mild Pain (1 - 3)  aluminum hydroxide/magnesium hydroxide/simethicone Suspension 30 milliLiter(s) Oral every 4 hours PRN Dyspepsia  dextrose Oral Gel 15 Gram(s) Oral once PRN Blood Glucose LESS THAN 70 milliGRAM(s)/deciliter  melatonin 3 milliGRAM(s) Oral at bedtime PRN Insomnia  ondansetron Injectable 4 milliGRAM(s) IV Push every 8 hours PRN Nausea and/or Vomiting    Allergies    CT scan dye (Hives)  penicillin (Unknown)    Intolerances        CONSTITUTIONAL: No acute distress. Awake and alert.  RESPIRATORY: CTAB. No wheezes, rales, or rhonchi. No accessory muscle use. No apparent respiratory distress.  CARDIOVASCULAR: +S1/S2. No audible S3/S4. Regular rate and rhythm. No murmurs, rubs, or gallops. No LE swelling or edema.  GASTROINTESTINAL: Soft, nontender, nondistended. +BS. No rebound or guarding.   MUSCULOSKELETAL: Spontaneous movement in all extremities.  NEUROLOGICAL: CN 2-12 grossly intact. No focal deficits. Sensation intact x 4EXT.   PSYCHIATRIC: Appropriate affect. A&Ox3 (oriented to person, place, and time).                              12.3   6.70  )-----------( 250      ( 27 Apr 2024 09:54 )             41.5       04-28    139  |  105  |  31<H>  ----------------------------<  131<H>  5.1   |  22  |  1.61<H>    Ca    9.4      28 Apr 2024 06:31  Phos  3.4     04-28  Mg     2.2     04-28      CAPILLARY BLOOD GLUCOSE      POCT Blood Glucose.: 187 mg/dL (28 Apr 2024 17:35)  POCT Blood Glucose.: 224 mg/dL (28 Apr 2024 12:45)  POCT Blood Glucose.: 134 mg/dL (28 Apr 2024 08:48)  POCT Blood Glucose.: 114 mg/dL (27 Apr 2024 22:02)      CARDIAC MARKERS ( 27 Apr 2024 00:52 )  x     / x     / 103 U/L / x     / 2.6 ng/mL      Urinalysis Basic - ( 28 Apr 2024 06:31 )    Color: x / Appearance: x / SG: x / pH: x  Gluc: 131 mg/dL / Ketone: x  / Bili: x / Urobili: x   Blood: x / Protein: x / Nitrite: x   Leuk Esterase: x / RBC: x / WBC x   Sq Epi: x / Non Sq Epi: x / Bacteria: x        Culture - Urine (collected 26 Apr 2024 14:11)  Source: Clean Catch Clean Catch (Midstream)  Preliminary Report (28 Apr 2024 13:07):    10,000 - 49,000 CFU/mL Escherichia coli          RADIOLOGY AND ADDITIONAL TESTS:    CONSULTANT NOTES REVIEWED:    CARE DISCUSSED WITH THE FOLLOWING CONSULTANTS/PROVIDERS:

## 2024-04-29 NOTE — PROGRESS NOTE ADULT - PROBLEM SELECTOR PLAN 1
- Pt takes Lantus 21 units qHS with novolog 7 units prior to her mid-day dinner. Dose has been unchanged for 1 year, since initiating insulin  - Fingersticks 39 on hospital presentation, now improved to 99 with dextrose push and after giving pt something to eat  - A1c 6.6, question whether patient requires insulin anymore  - Fingersticks stable off insulin  - Endocrine recs appreciated - c/w ISS; preliminary plan to discontinue basal bolus, initiate DPP4i (Januvia 25 mg daily) and SGLT2i (Jardiance 10 daily)
- Pt takes Lantus 21 units qHS with novolog 7 units prior to her mid-day dinner. Dose has been unchanged for 1 year, since initiating insulin  - Fingersticks 39 on hospital presentation, now improved to 99 with dextrose push and after giving pt something to eat  - A1c 6.6, question whether patient requires insulin anymore  - Fingersticks stable off insulin  - Endocrine recs appreciated - c/w ISS; preliminary plan to discontinue basal bolus, initiate DPP4i (Januvia 25 mg daily) and SGLT2i (Jardiance 10 daily)

## 2024-04-29 NOTE — CONSULT NOTE ADULT - REASON FOR ADMISSION
Generalized weakness in the setting of hypoglycemia
Generalized weakness in the setting of hypoglycemia

## 2024-04-29 NOTE — PROGRESS NOTE ADULT - REASON FOR ADMISSION
Generalized weakness in the setting of hypoglycemia

## 2024-04-29 NOTE — PROGRESS NOTE ADULT - PROBLEM SELECTOR PLAN 2
- In the setting of hypoglycemia. Pt now improved s/p normalization of blood sugars  - Orthostatics negative  - PT recommends home PT
- In the setting of hypoglycemia. Pt now improved s/p normalization of blood sugars  - Orthostatics negative  - PT recommends home PT

## 2024-04-29 NOTE — PROGRESS NOTE ADULT - ASSESSMENT
84 year old female with a PMH of HTN, HLD, DM2, HFpEF (EF 65%), CAD s/p CABG, hx of recurrent SVT, chronic atrial fibrillation (on eliquis), Breast CA s/p R partial mastectomy, rectal CA s/p resection who presents with hypoglycemia. Endocrine consulted for hypoglycemia and T2DM management.    #Hypoglycemia  #T2DM  T2DM diagnosed 6-7 yrs ago  Medications: Was on oral medications until 1 year ago. Has been on insulin since then: Lantus 21 units QHS, Novolog 7 units prior to dinner.  Previous meds: Glimepiride. Ozempic but did not feel well and was discontinued.  A1c: 6.6%  eGR: 20-30s.  Complications: CAD s/p CABG, CHF. No known retinopathy, neuropathy.   Diet: Eats large "dinner" late afternoon and eats small snacks throughout the day.  SMBG: recently had frequent hypoglycemia (as noted in HPI) with sx of weakness, falling    DM strictly controlled with recent frequent hypoglycemia. Given nature of her meals and renal dysfunction and low insulin requirements, may benefit from discontinuing insulin and starting non-insulin diabetic regimen outpatient.    Inpatient plan:  - Inpatient BG goal 100-180  - continue Low dose admelog correction scale TIDAC  - continue Low dose admelog correction scale QHS  - If continues to have blood glucose >180s tomorrow, can consider starting low dose basal insulin  - CC diet    Discharge plan:  - Discharge regimen: stop basal bolus insulin due to frequent hypoglycemia.   . Recommend starting DPP4i (e.g. Januvia 25mg daily) and SGLT2i (jardiance 10mg daily) given CAD, CHF hx.  -IF Januvia or Jardiance are not covered by her insurance, would recommend Lantus 8units QHS and Admelog 5 units with each meal.   - Endocrine follow up: Patient prefers to continue seeing PCP because she does not want to see multiple doctors. Advise close follow up for diabetes with her PCP  - Routine ophthalmology and podiatry follow up    Contact via Microsoft Teams during business hours  To reach covering provider access SAMPSON via sunrise tools  For Urgent matters/after-hours/weekends/holidays please page endocrine fellow on call   For nonurgent matters please email [email or web address]    Please note that this patient may be followed by different provider tomorrow.  Notify endocrine 24 hours prior to discharge for final recommendations   
84F PMHx HTN, HLD, DM2, HFpEF (EF 65%), CAD s/p CABG, hx of recurrent SVT, chronic atrial fibrillation (on eliquis), Breast CA s/p R partial mastectomy, rectal CA s/p resection who presents with generalized weakness in the setting of severe hypoglycemia. Pt presents with multiple hypoglycemic episodes of the past few days of unclear etiology. Pt insulin dosing has not changed recently and pt denies any change in dietary habits. Generalized weakness is now improved now with improvement in blood glucose. Will monitor her off insulin for now as fingersticks are in the low 100s without insulin. She likely will require a lower dose of insulin upon discharge. 
84F PMHx HTN, HLD, DM2, HFpEF (EF 65%), CAD s/p CABG, hx of recurrent SVT, chronic atrial fibrillation (on eliquis), Breast CA s/p R partial mastectomy, rectal CA s/p resection who presents with generalized weakness in the setting of severe hypoglycemia. Pt presents with multiple hypoglycemic episodes of the past few days of unclear etiology. Pt insulin dosing has not changed recently and pt denies any change in dietary habits. Generalized weakness is now improved now with improvement in blood glucose. Will monitor her off insulin for now as fingersticks are in the low 100s without insulin. Plan to dc home off insulin.

## 2024-04-29 NOTE — PROGRESS NOTE ADULT - CONVERSATION DETAILS
Discussed advance directives with patient and daughter Shauna Jennings at bedside who provided Sinhala translation. Daughter states they have already filled out MOLST previously and patient is DNR/DNI. Reviewed past C discussion which affirms this. Daughter has MOLST at home, will bring it in next time.

## 2024-04-29 NOTE — PROGRESS NOTE ADULT - PROBLEM SELECTOR PLAN 4
- C/w home Eliquis 2.5mg BID  - C/w home metoprolol tartrate 100mg BID
- C/w home Eliquis 2.5mg BID  - C/w home metoprolol tartrate 100mg BID

## 2024-04-29 NOTE — PROGRESS NOTE ADULT - PROBLEM SELECTOR PLAN 7
- f/u outpatient PMD; pt will need repeat urinalysis
- f/u outpatient PMD; pt will need repeat urinalysis

## 2024-04-29 NOTE — PROGRESS NOTE ADULT - PROBLEM SELECTOR PROBLEM 1
Type 2 diabetes mellitus with hypoglycemia

## 2024-04-29 NOTE — PROGRESS NOTE ADULT - PROBLEM SELECTOR PLAN 8
- DVT ppx: Eliquis  - GI ppx: None  - Diet: DASH, diabetic  - fall precautions. Pending PT    dispo: plan for dc home with home PT today, d/w and agreed on by pt and daughter Shauna    total discharge time: 46 minutes.
- DVT ppx: Eliquis  - GI ppx: None  - Diet: DASH, diabetic  - fall precautions. Pending PT    Prepare for discharge, estimated discharge date 4/29

## 2024-04-29 NOTE — DISCHARGE NOTE NURSING/CASE MANAGEMENT/SOCIAL WORK - NSTRANSFERBELONGINGSDISPO_GEN_A_NUR
Sent in Zoey Harper's chart.  Please advise.         Hi!!!   Hope you are well...Carmelo Chaim and ROVERTO.Brenda Harper have relocated to [facility] for now!! Carmelo has run out of his Trulicity prescription and when he tried to renew it he was told you declined because he missed a lab..We are not in [place] at his time..I have scheduled a new doctor for him up here but she cannot see him until August 5..Could you please renew the Trulicity for at least a month as he is totally out of refills . we are using [facility] in [facility]..at [address]  [phone]   Thank you in advance!!!  Brenda     given to family/with patient

## 2024-04-29 NOTE — DISCHARGE NOTE NURSING/CASE MANAGEMENT/SOCIAL WORK - PATIENT PORTAL LINK FT
You can access the FollowMyHealth Patient Portal offered by [facility] by registering at the following website: [email or web address]. By joining [facility]’s FollowMyHealth portal, you will also be able to view your health information using other applications (apps) compatible with our system.

## 2024-05-03 NOTE — ED PROCEDURE NOTE - ATTENDING WITH...
Patient's Mother calling in, checking to see if the lab results have came in from [address] [place]. Please call pt's Mother with status. Resident

## 2024-05-03 NOTE — ED PROCEDURE NOTE - PROCEDURE ADDITIONAL DETAILS
Above line not placed d/t poor pt habitus and positioning, attempted once through dynamic guidance using ultrasound.   Dark, nonpulsatile blood return not noted, not flushed afterwards.

## 2024-05-07 ENCOUNTER — APPOINTMENT (OUTPATIENT)
Dept: INTERNAL MEDICINE | Facility: CLINIC | Age: 84
End: 2024-05-07
Payer: MEDICARE

## 2024-05-07 VITALS
SYSTOLIC BLOOD PRESSURE: 116 MMHG | OXYGEN SATURATION: 96 % | HEIGHT: 61 IN | TEMPERATURE: 97.3 F | BODY MASS INDEX: 33.99 KG/M2 | DIASTOLIC BLOOD PRESSURE: 74 MMHG | RESPIRATION RATE: 12 BRPM | WEIGHT: 180 LBS | HEART RATE: 87 BPM

## 2024-05-07 DIAGNOSIS — E11.9 TYPE 2 DIABETES MELLITUS W/OUT COMPLICATIONS: ICD-10-CM

## 2024-05-07 DIAGNOSIS — I10 ESSENTIAL (PRIMARY) HYPERTENSION: ICD-10-CM

## 2024-05-07 DIAGNOSIS — I82.409 ACUTE EMBOLISM AND THROMBOSIS OF UNSPECIFIED DEEP VEINS OF UNSPECIFIED LOWER EXTREMITY: ICD-10-CM

## 2024-05-07 DIAGNOSIS — I25.10 ATHEROSCLEROTIC HEART DISEASE OF NATIVE CORONARY ARTERY W/OUT ANGINA PECTORIS: ICD-10-CM

## 2024-05-07 DIAGNOSIS — E78.5 HYPERLIPIDEMIA, UNSPECIFIED: ICD-10-CM

## 2024-05-07 PROCEDURE — 99214 OFFICE O/P EST MOD 30 MIN: CPT

## 2024-05-08 ENCOUNTER — APPOINTMENT (OUTPATIENT)
Dept: ENDOCRINOLOGY | Facility: CLINIC | Age: 84
End: 2024-05-08
Payer: MEDICARE

## 2024-05-08 VITALS
DIASTOLIC BLOOD PRESSURE: 76 MMHG | WEIGHT: 180 LBS | SYSTOLIC BLOOD PRESSURE: 141 MMHG | TEMPERATURE: 98 F | HEIGHT: 61 IN | BODY MASS INDEX: 33.99 KG/M2 | OXYGEN SATURATION: 95 % | HEART RATE: 82 BPM

## 2024-05-08 DIAGNOSIS — E16.2 HYPOGLYCEMIA, UNSPECIFIED: ICD-10-CM

## 2024-05-08 LAB — GLUCOSE BLDC GLUCOMTR-MCNC: 160

## 2024-05-08 PROCEDURE — 99204 OFFICE O/P NEW MOD 45 MIN: CPT | Mod: 25

## 2024-05-08 PROCEDURE — 36415 COLL VENOUS BLD VENIPUNCTURE: CPT

## 2024-05-08 RX ORDER — INSULIN ASPART 100 [IU]/ML
100 INJECTION, SOLUTION INTRAVENOUS; SUBCUTANEOUS 3 TIMES DAILY
Qty: 3 | Refills: 0 | Status: DISCONTINUED | COMMUNITY
End: 2024-05-08

## 2024-05-08 RX ORDER — INSULIN GLARGINE 100 [IU]/ML
100 INJECTION, SOLUTION SUBCUTANEOUS
Qty: 1 | Refills: 1 | Status: DISCONTINUED | COMMUNITY
Start: 2023-12-07 | End: 2024-05-08

## 2024-05-08 RX ORDER — EMPAGLIFLOZIN 10 MG/1
10 TABLET, FILM COATED ORAL DAILY
Qty: 90 | Refills: 2 | Status: ACTIVE | COMMUNITY
Start: 2024-05-08 | End: 1900-01-01

## 2024-05-08 RX ORDER — SITAGLIPTIN 25 MG/1
25 TABLET, FILM COATED ORAL DAILY
Qty: 90 | Refills: 2 | Status: ACTIVE | COMMUNITY
Start: 2024-05-08 | End: 1900-01-01

## 2024-05-08 RX ORDER — INSULIN ASPART 100 [IU]/ML
100 INJECTION, SOLUTION INTRAVENOUS; SUBCUTANEOUS 3 TIMES DAILY
Qty: 1 | Refills: 3 | Status: DISCONTINUED | COMMUNITY
Start: 2024-03-15 | End: 2024-05-08

## 2024-05-08 NOTE — HISTORY OF PRESENT ILLNESS
[FreeTextEntry1] : She is accompanied by her daughter, who provides translation.  CC: T2DM  This is a 84-year-old female with T2DM with CKD, A-fib, HLD, anemia, GERD, CHF, history of hydronephrosis, breast cancer s/p right partial mastectomy, colorectal carcinoma s/p resection/chemotherapy/RT, here for a consultation.  She was diagnosed with T2DM over 10 years ago.  One week prior to recent hospitalization at [facility] on 4/26/2024, she was found to have hypoglycemia (glucose 37 mg/dL).  At that time, she was taking Basaglar 21 units and Novolog 7 units before meals. Per daughter, EMS gave glucagon and left. One week later, she slipped off the bed and glucose level at that time was 80 however, when she reached the hospital, it was 39. Insulin was discontinued and was started on Jardiance 10 mg daily and Januvia 25 mg daily. She was discharged on 4/29/2024.  She is on Januvia 25 mg daily, Jardiance 10 mg daily. She was on Basaglar and Novolog in the past. She SMBG several times a day and levels are over 200s throughout the day and the highest level is in the 290s after meals  Last ophthalmology visit was 2023. Denies retinopathy. Has cataracts. Has nephropathy. Denies neuropathy.  She is on atorvastatin 40 mg daily.  She is not on an ACE inhibitor or ARB.

## 2024-05-08 NOTE — PHYSICAL EXAM
[Alert] : alert [No Acute Distress] : no acute distress [Normal Sclera/Conjunctiva] : normal sclera/conjunctiva [No Proptosis] : no proptosis [No Neck Mass] : no neck mass was observed [No LAD] : no lymphadenopathy [Supple] : the neck was supple [Thyroid Not Enlarged] : the thyroid was not enlarged [No Thyroid Nodules] : no palpable thyroid nodules [No Respiratory Distress] : no respiratory distress [Normal Rate] : heart rate was normal [Normal] : normal [Normal Sensation on Monofilament Testing] : normal sensation on monofilament testing of lower extremities [Normal Affect] : the affect was normal [Normal Insight/Judgement] : insight and judgment were intact [Normal Mood] : the mood was normal [Well Nourished] : well nourished [Healthy Appearance] : healthy appearance [Well Developed] : well developed [Diminished Throughout Both Feet] : normal tactile sensation with monofilament testing throughout both feet

## 2024-05-08 NOTE — ASSESSMENT
[FreeTextEntry1] : This is a 84-year-old female with T2DM with CKD, A-fib, HLD, anemia, GERD, CHF, history of hydronephrosis, rectal carcinoma, breast cancer s/p right partial mastectomy, colorectal carcinoma s/p resection/chemotherapy/RT, here for a consultation.  hbA1c from March 2024 is 6.8%. One week prior to recent hospitalization at [facility] on 4/26/2024, she was found to have hypoglycemia (glucose 37 mg/dL).  At that time, she was taking Basaglar 21 units and Novolog 7 units before meals. Per daughter, EMS gave glucagon and left. One week later, she slipped off the bed, glucose level at that time was 80 however, when she reached the hospital it was 39. Insulin was discontinued and was started on Jardiance 10 mg daily and Januvia 25 mg daily. She was discharged on 4/29/2024.  She is on Januvia 25 mg daily, Jardiance 10 mg daily.  Last ophthalmology visit was 2023. Denies retinopathy. Has cataracts. Has nephropathy. Denies neuropathy.  She is on atorvastatin 40 mg daily. LDL from March 2024 is 73.  She is not on an ACE inhibitor or ARB.  Check CMP Will consider increasing Jardiance to 25 mg daily pending bloodwork results.  HubNami Pro CGM placed today to further guide management

## 2024-05-08 NOTE — REVIEW OF SYSTEMS
[All other systems negative] : All other systems negative [Fatigue] : fatigue [Lower Ext Edema] : lower extremity edema

## 2024-05-08 NOTE — ADDENDUM
[FreeTextEntry1] :  By signing my name below, I, Toy Metcalf, attest that this document has been prepared under the direction and in the presence of Dr. Snyder.  I, Isaac Snyder MD, personally performed the services described in this documentation. All medical record entries made by the scribe were at my discretion and in my presence. I have reviewed the chart and discharge instructions (if applicable) and agree that the record reflects my personal permanence and is accurate and complete.

## 2024-05-15 LAB
ALBUMIN SERPL ELPH-MCNC: 4 G/DL
ALP BLD-CCNC: 98 U/L
ALT SERPL-CCNC: 7 U/L
ANION GAP SERPL CALC-SCNC: 12 MMOL/L
AST SERPL-CCNC: 15 U/L
BILIRUB SERPL-MCNC: 0.4 MG/DL
BUN SERPL-MCNC: 35 MG/DL
CALCIUM SERPL-MCNC: 9.1 MG/DL
CHLORIDE SERPL-SCNC: 103 MMOL/L
CO2 SERPL-SCNC: 24 MMOL/L
CREAT SERPL-MCNC: 2.03 MG/DL
EGFR: 23 ML/MIN/1.73M2
GLUCOSE SERPL-MCNC: 173 MG/DL
POTASSIUM SERPL-SCNC: 5.2 MMOL/L
PROT SERPL-MCNC: 6.9 G/DL
SODIUM SERPL-SCNC: 139 MMOL/L

## 2024-05-28 ENCOUNTER — APPOINTMENT (OUTPATIENT)
Dept: ENDOCRINOLOGY | Facility: CLINIC | Age: 84
End: 2024-05-28

## 2024-05-30 PROBLEM — E11.9 DIABETES MELLITUS: Status: ACTIVE | Noted: 2022-04-08

## 2024-05-30 PROBLEM — I82.409 DVT (DEEP VENOUS THROMBOSIS): Status: ACTIVE | Noted: 2023-04-24

## 2024-05-30 PROBLEM — I25.10 CAD (CORONARY ARTERY DISEASE): Status: ACTIVE | Noted: 2021-01-12

## 2024-05-30 NOTE — PLAN
[FreeTextEntry1] : See plan    DM Continue at home glucose monitoring  Low carb, low sugar diet/ increased physical activity Cont with Jardiance 10 mg daily Cont with Januvia 10 mg daily Appointment to see endocrinologist tomorrow   HTN/HLD/HF/DVT history Follows with Dr. Hagan Low sodium diet cont Amlodipine 5 mg daily cont Metoprolol 150 mg daily cont Bumetanide 1 mg daily cont Spironolactone 25 mg 1/2 tab daily cont Atorvastatin 40 mg daily cont. Pazamkx52 mg daily cont Eliquis 2.5 mg BID daily

## 2024-05-30 NOTE — HISTORY OF PRESENT ILLNESS
[de-identified] : Ms. Fernanda Batista is an 84-year-old female with a hx of HTN, hyperlipidemia, type 2 diabetes mellitus, breast cancer s/p right partial mastectomy, presents to the clinic today for a follow up visit accompanied by daughter.   Patient was admitted to [facility] 4/26/2024. One week prior to hospital admission, daughter called EMS as patient was lethargic, FS was 37.  Patient was taking Basaglar 21 units, and Novolog 7 units. As per daughter, EMS gave patient glucagon and left. One week later 4/25/2024, patient had generalized weakness, and slipped off the bed. Daughter called EMS and was taken to hospital. Patient was admitted for type 2 DM with hypoglycemia, generalized weakness, chronic a-fib, chronic heart failure with persevered ejection fraction. Patient was instructed to stop taking Basaglar 21 units, and NovoLog 7 units. Patient started on Jardiance 10 mg and Januvia 10 mg. Patient discharged 4/29/2024. As per daughter, patient has been doing well since. At home readings have been in the 200's before and after meals. Reports visiting nurse came twice to monitor the patient, next appointment sometime this week. Does not follow with endocrinologist and would like to see one. Deniesfevers, h/a, hair loss, oral ulcers, epistaxis, sinusitis, swollen glands, dry eyes, CP, SOB, cough, vision changes, abdominal pain, GERD, n/v/d, blood in stool or urine, focal weakness, sensory loss, weight loss.

## 2024-05-30 NOTE — PHYSICAL EXAM
[No Acute Distress] : no acute distress [Normal Sclera/Conjunctiva] : normal sclera/conjunctiva [PERRL] : pupils equal round and reactive to light [EOMI] : extraocular movements intact [Normal Outer Ear/Nose] : the outer ears and nose were normal in appearance [Normal Oropharynx] : the oropharynx was normal [Normal TMs] : both tympanic membranes were normal [No JVD] : no jugular venous distention [No Lymphadenopathy] : no lymphadenopathy [Supple] : supple [Thyroid Normal, No Nodules] : the thyroid was normal and there were no nodules present [No Respiratory Distress] : no respiratory distress  [No Accessory Muscle Use] : no accessory muscle use [Clear to Auscultation] : lungs were clear to auscultation bilaterally [Normal Rate] : normal rate  [Regular Rhythm] : with a regular rhythm [Normal S1, S2] : normal S1 and S2 [Soft] : abdomen soft [Non Tender] : non-tender [Non-distended] : non-distended [Normal Bowel Sounds] : normal bowel sounds [No CVA Tenderness] : no CVA  tenderness [No Spinal Tenderness] : no spinal tenderness [No Joint Swelling] : no joint swelling [Grossly Normal Strength/Tone] : grossly normal strength/tone [No Rash] : no rash [Coordination Grossly Intact] : coordination grossly intact [No Focal Deficits] : no focal deficits [Normal Affect] : the affect was normal [Normal Insight/Judgement] : insight and judgment were intact [de-identified] :  + varicose/spider  vein, Left arm lymphedema [de-identified] : ambulates with walker

## 2024-05-30 NOTE — ADDENDUM
[FreeTextEntry1] :   Documented by Diogo Hilton acting as a scribe for Dr. Katie Espinoza. 05/07/2024   All medical record entries made by the Scribe were at my, Dr. Katie Espinoza, direction and personally dictated by me on 05/07/2024. I have reviewed the chart and agree that the record accurately reflects my personal performance of the history, physical exam, assessment and plan. I have also personally directed, reviewed, and agreed with the chart.

## 2024-05-31 RX ORDER — APIXABAN 2.5 MG/1
2.5 TABLET, FILM COATED ORAL
Qty: 180 | Refills: 0 | Status: ACTIVE | COMMUNITY
Start: 2019-05-13 | End: 1900-01-01

## 2024-05-31 RX ORDER — GABAPENTIN 100 MG/1
100 CAPSULE ORAL
Qty: 180 | Refills: 0 | Status: ACTIVE | COMMUNITY
Start: 1900-01-01 | End: 1900-01-01

## 2024-05-31 RX ORDER — ATORVASTATIN CALCIUM 40 MG/1
40 TABLET, FILM COATED ORAL
Qty: 90 | Refills: 0 | Status: ACTIVE | COMMUNITY
Start: 2021-03-25 | End: 1900-01-01

## 2024-05-31 RX ORDER — METOPROLOL SUCCINATE 100 MG/1
100 TABLET, EXTENDED RELEASE ORAL
Qty: 180 | Refills: 0 | Status: ACTIVE | COMMUNITY
Start: 2019-01-22 | End: 1900-01-01

## 2024-06-03 RX ORDER — REPAGLINIDE 0.5 MG/1
0.5 TABLET ORAL
Qty: 90 | Refills: 2 | Status: ACTIVE | COMMUNITY
Start: 2024-06-03 | End: 1900-01-01

## 2024-06-04 ENCOUNTER — APPOINTMENT (OUTPATIENT)
Dept: ENDOCRINOLOGY | Facility: CLINIC | Age: 84
End: 2024-06-04
Payer: MEDICARE

## 2024-06-04 PROCEDURE — 95250 CONT GLUC MNTR PHYS/QHP EQP: CPT

## 2024-06-11 NOTE — H&P PST ADULT - ABUSE SCREEN COMMENT, PROFILE
Additional Notes: If no improvement for next visit pt will consider starting Dupixent Render Risk Assessment In Note?: no Detail Level: Simple updated  8/3/2015

## 2024-06-14 NOTE — PATIENT PROFILE ADULT - SAFE PLACE TO LIVE
Iram Monte is requesting a refill on the following medication(s):  Requested Prescriptions     Pending Prescriptions Disp Refills    atenolol (TENORMIN) 50 MG tablet [Pharmacy Med Name: Atenolol 50 MG Oral Tablet] 180 tablet 0     Sig: Take 1 tablet by mouth twice daily       Last Visit Date (If Applicable):  6/5/2024    Next Visit Date:    7/10/2024   no

## 2024-06-19 NOTE — PROGRESS NOTE ADULT - SUBJECTIVE AND OBJECTIVE BOX
Left message on voicemail regarding lab results from Dr. Mathews office. Lab results sent to patient portal.   [facility] Division of Hospital Medicine  Margy Almaraz MD M-F, 8A-5P: MS Teams, Pager: [phone]  Other Times: Ext: [phone], Pager: [phone]      Patient is a 82y old  Female who presents with a chief complaint of chf (11 Sep 2022 12:42)      SUBJECTIVE / OVERNIGHT EVENTS:  Video Barbadian  on hospital  ipad.   Patient was examined this morning. She was walking with PT using roller walker initially and then sat in chair. Reports that she is still having BL "kidney pain" which sometimes radiates to her abdomen when it gets worse. Has slight dyspnea still. She feels a little dizzy when she gets up and it resolves on its own. Has chronic R arm swelling which is unchanged. Rest of the ROS negative.       ADDITIONAL REVIEW OF SYSTEMS:    MEDICATIONS  (STANDING):  amLODIPine   Tablet 5 milliGRAM(s) Oral daily  apixaban 2.5 milliGRAM(s) Oral two times a day  aspirin  chewable 81 milliGRAM(s) Oral daily  atorvastatin 40 milliGRAM(s) Oral at bedtime  buMETAnide Injectable 1 milliGRAM(s) IV Push every 12 hours  dextrose 5%. 1000 milliLiter(s) (50 mL/Hr) IV Continuous <Continuous>  dextrose 5%. 1000 milliLiter(s) (100 mL/Hr) IV Continuous <Continuous>  dextrose 50% Injectable 25 Gram(s) IV Push once  dextrose 50% Injectable 12.5 Gram(s) IV Push once  dextrose 50% Injectable 25 Gram(s) IV Push once  ertapenem  IVPB      famotidine    Tablet 20 milliGRAM(s) Oral daily  gabapentin 200 milliGRAM(s) Oral three times a day  glucagon  Injectable 1 milliGRAM(s) IntraMuscular once  insulin glargine Injectable (LANTUS) 16 Unit(s) SubCutaneous at bedtime  insulin lispro (ADMELOG) corrective regimen sliding scale   SubCutaneous three times a day before meals  insulin lispro (ADMELOG) corrective regimen sliding scale   SubCutaneous at bedtime  insulin lispro Injectable (ADMELOG) 7 Unit(s) SubCutaneous three times a day before meals  lidocaine   4% Patch 1 Patch Transdermal every 24 hours  metoprolol succinate  milliGRAM(s) Oral daily  polyethylene glycol 3350 17 Gram(s) Oral daily  spironolactone 12.5 milliGRAM(s) Oral daily    MEDICATIONS  (PRN):  dextrose Oral Gel 15 Gram(s) Oral once PRN Blood Glucose LESS THAN 70 milliGRAM(s)/deciliter  melatonin 3 milliGRAM(s) Oral at bedtime PRN Insomnia      CAPILLARY BLOOD GLUCOSE      POCT Blood Glucose.: 179 mg/dL (12 Sep 2022 11:41)  POCT Blood Glucose.: 169 mg/dL (12 Sep 2022 07:43)  POCT Blood Glucose.: 163 mg/dL (11 Sep 2022 21:31)  POCT Blood Glucose.: 133 mg/dL (11 Sep 2022 16:58)      I&O's Summary    11 Sep 2022 07:01  -  12 Sep 2022 07:00  --------------------------------------------------------  IN: 350 mL / OUT: 400 mL / NET: -50 mL    12 Sep 2022 07:01  -  12 Sep 2022 14:07  --------------------------------------------------------  IN: 50 mL / OUT: 700 mL / NET: -650 mL        Daily     Daily Weight in k.1 (12 Sep 2022 10:49)    PHYSICAL EXAM:  Vital Signs Last 24 Hrs  T(C): 36.6 (12 Sep 2022 09:17), Max: 36.6 (11 Sep 2022 17:59)  T(F): 97.9 (12 Sep 2022 09:17), Max: 97.9 (12 Sep 2022 09:17)  HR: 66 (12 Sep 2022 09:17) (66 - 71)  BP: 134/55 (12 Sep 2022 09:17) (115/68 - 134/55)  BP(mean): --  RR: 18 (12 Sep 2022 09:17) (18 - 18)  SpO2: 93% (12 Sep 2022 09:17) (93% - 99%)    Parameters below as of 12 Sep 2022 09:17  Patient On (Oxygen Delivery Method): room air      CONSTITUTIONAL: NAD, well-developed  EYES: PERRLA; conjunctiva and sclera clear  ENMT: Moist oral mucosa,  NECK: Supple,  RESPIRATORY: Normal respiratory effort; lungs are clear to auscultation bilaterally  CARDIOVASCULAR: Regular rate and rhythm, normal S1 and S2, no murmur/rub/gallop;   Mild pedal edema; +Right UE edema - chronic per patient. Peripheral pulses are 2+ bilaterally.   ABDOMEN: Nontender to palpation, normoactive bowel sounds, no rebound/guarding;   BACK: BL flank tenderness   MUSCULOSKELETAL:  no clubbing or cyanosis of digits; no joint swelling or tenderness to palpation, moving all extremities   PSYCH: A+O to person, place, and time; affect appropriate  SKIN: chronic LE venous stasis ulcerations         LABS:                        12.8   8.11  )-----------( 266      ( 11 Sep 2022 06:47 )             41.5     09-12    139  |  100  |  50<H>  ----------------------------<  177<H>  4.4   |  27  |  2.01<H>    Ca    9.4      12 Sep 2022 07:22  Phos  3.7       Mg     1.9     09-12                  SARS-CoV-2: NotDetec (07 Sep 2022 19:04)  COVID-19 PCR: NotDetec (07 Sep 2022 12:16)      RADIOLOGY & ADDITIONAL TESTS:  Results Reviewed:   Imaging Personally Reviewed:  Electrocardiogram Personally Reviewed:    COORDINATION OF CARE:  Care Discussed with Consultants/Other Providers [Y/N]:  Prior or Outpatient Records Reviewed [Y/N]:

## 2024-07-17 ENCOUNTER — APPOINTMENT (OUTPATIENT)
Dept: INTERNAL MEDICINE | Facility: CLINIC | Age: 84
End: 2024-07-17
Payer: MEDICARE

## 2024-07-17 ENCOUNTER — APPOINTMENT (OUTPATIENT)
Dept: CARDIOLOGY | Facility: CLINIC | Age: 84
End: 2024-07-17
Payer: MEDICARE

## 2024-07-17 ENCOUNTER — NON-APPOINTMENT (OUTPATIENT)
Age: 84
End: 2024-07-17

## 2024-07-17 VITALS
DIASTOLIC BLOOD PRESSURE: 70 MMHG | BODY MASS INDEX: 33.99 KG/M2 | SYSTOLIC BLOOD PRESSURE: 116 MMHG | OXYGEN SATURATION: 92 % | RESPIRATION RATE: 12 BRPM | HEART RATE: 84 BPM | HEIGHT: 61 IN | WEIGHT: 180 LBS

## 2024-07-17 DIAGNOSIS — N18.9 CHRONIC KIDNEY DISEASE, UNSPECIFIED: ICD-10-CM

## 2024-07-17 DIAGNOSIS — L03.115 CELLULITIS OF RIGHT LOWER LIMB: ICD-10-CM

## 2024-07-17 DIAGNOSIS — I50.9 HEART FAILURE, UNSPECIFIED: ICD-10-CM

## 2024-07-17 DIAGNOSIS — E11.9 TYPE 2 DIABETES MELLITUS W/OUT COMPLICATIONS: ICD-10-CM

## 2024-07-17 DIAGNOSIS — I10 ESSENTIAL (PRIMARY) HYPERTENSION: ICD-10-CM

## 2024-07-17 PROCEDURE — 99214 OFFICE O/P EST MOD 30 MIN: CPT

## 2024-07-17 PROCEDURE — 93306 TTE W/DOPPLER COMPLETE: CPT

## 2024-07-17 PROCEDURE — 93000 ELECTROCARDIOGRAM COMPLETE: CPT

## 2024-07-17 PROCEDURE — G2211 COMPLEX E/M VISIT ADD ON: CPT

## 2024-07-17 RX ORDER — DOXYCYCLINE HYCLATE 100 MG/1
100 CAPSULE ORAL
Qty: 20 | Refills: 0 | Status: ACTIVE | COMMUNITY
Start: 2024-07-17 | End: 1900-01-01

## 2024-07-18 LAB
ALBUMIN SERPL ELPH-MCNC: 4.3 G/DL
ALP BLD-CCNC: 114 U/L
ALT SERPL-CCNC: 11 U/L
ANION GAP SERPL CALC-SCNC: 14 MMOL/L
AST SERPL-CCNC: 12 U/L
BASOPHILS # BLD AUTO: 0.04 K/UL
BASOPHILS NFR BLD AUTO: 0.4 %
BILIRUB SERPL-MCNC: 0.4 MG/DL
BUN SERPL-MCNC: 28 MG/DL
CALCIUM SERPL-MCNC: 9.7 MG/DL
CHLORIDE SERPL-SCNC: 104 MMOL/L
CO2 SERPL-SCNC: 23 MMOL/L
CREAT SERPL-MCNC: 2.06 MG/DL
DEPRECATED D DIMER PPP IA-ACNC: <150 NG/ML DDU
EGFR: 23 ML/MIN/1.73M2
EOSINOPHIL # BLD AUTO: 0.06 K/UL
EOSINOPHIL NFR BLD AUTO: 0.7 %
ESTIMATED AVERAGE GLUCOSE: 183 MG/DL
GLUCOSE SERPL-MCNC: 185 MG/DL
HBA1C MFR BLD HPLC: 8 %
HCT VFR BLD CALC: 48.8 %
HGB BLD-MCNC: 14.4 G/DL
IMM GRANULOCYTES NFR BLD AUTO: 0.1 %
LYMPHOCYTES # BLD AUTO: 1.94 K/UL
LYMPHOCYTES NFR BLD AUTO: 21.4 %
MAN DIFF?: NORMAL
MCHC RBC-ENTMCNC: 29.5 GM/DL
MCHC RBC-ENTMCNC: 30 PG
MCV RBC AUTO: 101.7 FL
MONOCYTES # BLD AUTO: 0.53 K/UL
MONOCYTES NFR BLD AUTO: 5.8 %
NEUTROPHILS # BLD AUTO: 6.48 K/UL
NEUTROPHILS NFR BLD AUTO: 71.6 %
PLATELET # BLD AUTO: 252 K/UL
POTASSIUM SERPL-SCNC: 4.4 MMOL/L
PROT SERPL-MCNC: 7.3 G/DL
RBC # BLD: 4.8 M/UL
RBC # FLD: 15.5 %
SODIUM SERPL-SCNC: 141 MMOL/L
WBC # FLD AUTO: 9.06 K/UL

## 2024-07-18 NOTE — DISCHARGE NOTE NURSING/CASE MANAGEMENT/SOCIAL WORK - NSPROEXTENSIONSOFSELF_GEN_A_NUR
Michel Lofton, a 26 y.o. male presents to the ED w/ complaint of body fluid exposure. States was doing a procedure in the OR and stuck himself with the needle. 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    LOC: The patient is awake, alert and aware of environment with an appropriate affect, the patient is oriented x 4 and speaking appropriately.  APPEARANCE: Patient resting comfortably and in no acute distress, patient is clean and well groomed, patient's clothing is properly fastened.  SKIN: The skin is warm and dry, color consistent with ethnicity, patient has normal skin turgor and moist mucus membranes, skin intact, no breakdown or bruising noted.  MUSCULOSKELETAL: Patient moving all extremities well, no obvious swelling or deformities noted.  RESPIRATORY: Airway is open and patent, respirations are spontaneous and even, patient has a normal effort and rate.  CARDIAC: Patient has a normal rate and rhythm, no periphreal edema noted, capillary refill < 3 seconds. Normal +2 pedal pulses present.  ABDOMEN: Soft and non tender to palpation, no distention noted. Patient denies any nausea, vomiting, diarrhea, or constipation.   NEUROLOGIC: Eyes open spontaneously, PERRL, behavior appropriate to situation, follows commands, facial expression symmetrical, bilateral hand grasp equal and even, purposeful motor response noted, normal sensation in all extremities.      none

## 2024-08-19 ENCOUNTER — APPOINTMENT (OUTPATIENT)
Dept: INTERNAL MEDICINE | Facility: CLINIC | Age: 84
End: 2024-08-19

## 2024-08-27 ENCOUNTER — APPOINTMENT (OUTPATIENT)
Dept: ENDOCRINOLOGY | Facility: CLINIC | Age: 84
End: 2024-08-27

## 2024-08-27 VITALS
HEIGHT: 61 IN | WEIGHT: 178 LBS | RESPIRATION RATE: 12 BRPM | SYSTOLIC BLOOD PRESSURE: 144 MMHG | DIASTOLIC BLOOD PRESSURE: 83 MMHG | BODY MASS INDEX: 33.61 KG/M2 | HEART RATE: 66 BPM | OXYGEN SATURATION: 95 %

## 2024-08-27 DIAGNOSIS — E11.9 TYPE 2 DIABETES MELLITUS W/OUT COMPLICATIONS: ICD-10-CM

## 2024-08-27 PROCEDURE — 99213 OFFICE O/P EST LOW 20 MIN: CPT

## 2024-08-27 NOTE — REVIEW OF SYSTEMS
[Fatigue] : fatigue [Lower Ext Edema] : lower extremity edema [All other systems negative] : All other systems negative

## 2024-08-27 NOTE — PHYSICAL EXAM
[Alert] : alert [Well Nourished] : well nourished [Healthy Appearance] : healthy appearance [No Acute Distress] : no acute distress [Well Developed] : well developed [Normal Sclera/Conjunctiva] : normal sclera/conjunctiva [No Proptosis] : no proptosis [No Neck Mass] : no neck mass was observed [No LAD] : no lymphadenopathy [Supple] : the neck was supple [Thyroid Not Enlarged] : the thyroid was not enlarged [No Thyroid Nodules] : no palpable thyroid nodules [No Respiratory Distress] : no respiratory distress [Normal Rate] : heart rate was normal [Normal Affect] : the affect was normal [Normal Insight/Judgement] : insight and judgment were intact [Normal Mood] : the mood was normal

## 2024-08-31 NOTE — HISTORY OF PRESENT ILLNESS
[FreeTextEntry1] : She is accompanied by her daughter, who provides translation.  CC: T2DM  This is a 84-year-old female with T2DM with CKD, A-fib, HLD, anemia, GERD, CHF, history of hydronephrosis, breast cancer s/p right partial mastectomy, colorectal carcinoma s/p resection/chemotherapy/RT, here for a follow-up.  She was diagnosed with T2DM over 10 years ago.  One week prior to recent hospitalization at [facility] on 4/26/2024, she was found to have hypoglycemia (glucose 37 mg/dL).  At that time, she was taking Basaglar 21 units and Novolog 7 units before meals. Per daughter, EMS gave glucagon and left. One week later, she slipped off the bed and glucose level at that time was 80 however, when she reached the hospital, it was 39. Insulin was discontinued and was started on Jardiance 10 mg daily and Januvia 25 mg daily. She was discharged on 4/29/2024.  She is on Januvia 25 mg daily, Jardiance 10 mg daily, and repaglinide 0.5 mg before breakfast. She was on Basaglar, and Novolog in the past. She SMBG once a day and reports fasting levels between 180s-190s.  Last ophthalmology visit was 2023. Denies retinopathy. Has cataracts. Has nephropathy. Denies neuropathy.  She is on atorvastatin 40 mg daily.  She is not on an ACE inhibitor or ARB.

## 2024-08-31 NOTE — HISTORY OF PRESENT ILLNESS
[FreeTextEntry1] : She is accompanied by her daughter, who provides translation.  CC: T2DM  This is a 84-year-old female with T2DM with CKD, A-fib, HLD, anemia, GERD, CHF, history of hydronephrosis, breast cancer s/p right partial mastectomy, colorectal carcinoma s/p resection/chemotherapy/RT, here for a follow-up.  She was diagnosed with T2DM over 10 years ago.  One week prior to recent hospitalization at [facility] on 4/26/2024, she was found to have hypoglycemia (glucose 37 mg/dL).  At that time, she was taking Basaglar 21 units and Novolog 7 units before meals. Per daughter, EMS gave glucagon and left. One week later, she slipped off the bed and glucose level at that time was 80 however, when she reached the hospital, it was 39. Insulin was discontinued and was started on Jardiance 10 mg daily and Januvia 25 mg daily. She was discharged on 4/29/2024.  She is on Januvia 25 mg daily, Jardiance 10 mg daily, and repaglinide 0.5 mg before breakfast. She was on Basaglar, and Novolog in the past. She SMBG once a day and reports fasting levels between 180s-190s.  Last ophthalmology visit was 2023. Denies retinopathy. Has cataracts. Has nephropathy. Denies neuropathy.  She is on atorvastatin 40 mg daily.  She is not on an ACE inhibitor or ARB.

## 2024-08-31 NOTE — ASSESSMENT
[FreeTextEntry1] : This is a 84-year-old female with T2DM with CKD, A-fib, HLD, anemia, GERD, CHF, history of hydronephrosis, rectal carcinoma, breast cancer s/p right partial mastectomy, colorectal carcinoma s/p resection/chemotherapy/RT, here for a follow-up.  hbA1c from 07/14/24 is 8%.  She is on Januvia 25 mg daily, Jardiance 10 mg daily, and repaglinide 0.5 mg before breakfast. She was on Basaglar, and Novolog in the past. Reports fasting hyperglycemia. Advised to check fingerstick glucose before dinner and 1-2 hours after dinner. May need to add repaglinide with dinner.  Last ophthalmology visit was 2023. Denies retinopathy. Has cataracts. Has nephropathy. Denies neuropathy.  She is on atorvastatin 40 mg daily. LDL from March 2024 is 73.  She is not on an ACE inhibitor or ARB.

## 2024-09-17 NOTE — ED ADULT TRIAGE NOTE - BP NONINVASIVE DIASTOLIC (MM HG)
Patient Specific Otc Recommendations (Will Not Stick From Patient To Patient): Moisturizing Lotion (CeraVe vs. Eucerin vs. La Roche Posay), anti-itch lotion (mixed with Triamcinolone) Detail Level: Zone 70

## 2024-09-25 ENCOUNTER — RESULT REVIEW (OUTPATIENT)
Age: 84
End: 2024-09-25

## 2024-10-04 ENCOUNTER — TRANSCRIPTION ENCOUNTER (OUTPATIENT)
Age: 84
End: 2024-10-04

## 2024-10-05 NOTE — PROGRESS NOTE ADULT - NS_MD_PANP_GEN_ALL_CORE
Name band;
Attending and PA/NP shared services statement (NON-critical care):

## 2024-10-15 ENCOUNTER — TRANSCRIPTION ENCOUNTER (OUTPATIENT)
Age: 84
End: 2024-10-15

## 2024-10-15 ENCOUNTER — OUTPATIENT (OUTPATIENT)
Dept: OUTPATIENT SERVICES | Facility: HOSPITAL | Age: 84
LOS: 1 days | End: 2024-10-15
Payer: MEDICARE

## 2024-10-15 VITALS
OXYGEN SATURATION: 98 % | RESPIRATION RATE: 14 BRPM | TEMPERATURE: 97 F | WEIGHT: 167.99 LBS | SYSTOLIC BLOOD PRESSURE: 125 MMHG | HEART RATE: 60 BPM | HEIGHT: 60 IN | DIASTOLIC BLOOD PRESSURE: 49 MMHG

## 2024-10-15 DIAGNOSIS — Z90.49 ACQUIRED ABSENCE OF OTHER SPECIFIED PARTS OF DIGESTIVE TRACT: Chronic | ICD-10-CM

## 2024-10-15 DIAGNOSIS — Z98.89 OTHER SPECIFIED POSTPROCEDURAL STATES: Chronic | ICD-10-CM

## 2024-10-15 DIAGNOSIS — Z96.641 PRESENCE OF RIGHT ARTIFICIAL HIP JOINT: Chronic | ICD-10-CM

## 2024-10-15 DIAGNOSIS — Z93.2 ILEOSTOMY STATUS: Chronic | ICD-10-CM

## 2024-10-15 DIAGNOSIS — N13.8 OTHER OBSTRUCTIVE AND REFLUX UROPATHY: ICD-10-CM

## 2024-10-15 DIAGNOSIS — Z90.11 ACQUIRED ABSENCE OF RIGHT BREAST AND NIPPLE: Chronic | ICD-10-CM

## 2024-10-15 DIAGNOSIS — Z96.651 PRESENCE OF RIGHT ARTIFICIAL KNEE JOINT: Chronic | ICD-10-CM

## 2024-10-15 DIAGNOSIS — M51.26 OTHER INTERVERTEBRAL DISC DISPLACEMENT, LUMBAR REGION: Chronic | ICD-10-CM

## 2024-10-15 PROCEDURE — C1729: CPT

## 2024-10-15 PROCEDURE — C1769: CPT

## 2024-10-15 PROCEDURE — 50435 EXCHANGE NEPHROSTOMY CATH: CPT | Mod: RT

## 2024-10-15 PROCEDURE — 50435 EXCHANGE NEPHROSTOMY CATH: CPT

## 2024-10-15 NOTE — PROCEDURE NOTE - NSPERFORMEDBY_GEN_A_CORE
Spoke with son, had surgery late December. Patient was at [facility] for 2-3 weeks then went to [facility] till 3/18/24.     She is not mobile, son had to call ambulance due to patient having leg pain. Took her to [facility]. Pb stated nothing was wrong with patient.     Per DS, home health information given. [facility].    Son v/u        Myself

## 2024-10-15 NOTE — ASU PATIENT PROFILE, ADULT - NSICDXPASTSURGICALHX_GEN_ALL_CORE_FT
PAST SURGICAL HISTORY:  Herniated lumbar intervertebral disc     History of appendectomy 1953    S/P colon resection 2015    S/P hip replacement, right     S/P ileostomy low anterior resection-8/10/15, reversal of ileostomy 2016    S/P mastectomy, right 1989    S/P TKR (total knee replacement), right 2017     Minoxidil Counseling: Minoxidil is a topical medication which can increase blood flow where it is applied. It is uncertain how this medication increases hair growth. Side effects are uncommon and include stinging and allergic reactions.

## 2024-10-15 NOTE — ASU DISCHARGE PLAN (ADULT/PEDIATRIC) - ASU DC SPECIAL INSTRUCTIONSFT
Drain Check    Discharge Instructions  - You have had a drain checked.  - Keep the area clean and dry.  - Do not soak in a tub or pool with the drain, however you may shower with the drain and dressing covered in plastic wrap.  - Do not put traction on the drain and be careful that the drain does not get accidentally dislodged or kinked.  - Record output daily from the drain. Empty the bag as needed.  - You may resume your normal diet.  - You may resume your normal medications.  - It is normal to experience some pain over the site for the next few days. You may take apply ice to the area (20 minutes on, 20 minutes off) and take Tylenol for that pain. Do not take more frequently than every 6 hours and do not exceed more than 3000mg of Tylenol in a 24 hour period.    Notify your primary physician and/or Interventional Radiology IMMEDIATELY if you experience any of the following       - Fever of 100.4F  or 38C       - Chills or Rigors/ Shakes       - Swelling and/or Redness in the area of the puncture site       - Worsening Pain       - Blood soaked bandages or worsening bleeding       - Lightheadedness and/or dizziness upon standing       - Chest Pain/ Tightness       - Shortness of Breath       - Difficulty walking    If you have a problem that you believe requires IMMEDIATE attention, please go to your NEAREST Emergency Room. If you believe your problem can safely wait until you speak to a physician, please call Interventional Radiology for any concerns.    Please feel free to contact us at [phone] if any problems arise. After 6PM, Monday through Friday, on weekends and on holidays, please call [phone] and ask for the radiology resident on call to be paged.

## 2024-10-15 NOTE — ASU PATIENT PROFILE, ADULT - FALL HARM RISK - HARM RISK INTERVENTIONS

## 2024-10-15 NOTE — ASU DISCHARGE PLAN (ADULT/PEDIATRIC) - NURSING INSTRUCTIONS
Please feel free to contact us at [phone] if any problems arise. After 6PM, Monday through Friday, on weekends and on holidays, please call [phone] and ask for the radiology resident on call to be paged.

## 2024-10-15 NOTE — PROCEDURE NOTE - PROCEDURE FINDINGS AND DETAILS
drain withdrawn and kinked ; drain exchanged over wire; sutured to skin and attached to drainage bag; dry sterile dressing applied.

## 2024-10-15 NOTE — ASU DISCHARGE PLAN (ADULT/PEDIATRIC) - NS MD DC FALL RISK RISK
For information on Fall & Injury Prevention, visit: [email or web address] OR  [email or web address] OR  https://www.cdc.gov/steadi/patient.html

## 2024-10-15 NOTE — H&P ADULT - HISTORY OF PRESENT ILLNESS
Interventional Radiology    HPI:  84y Female s/p right PCN on 9/24 in Interventional Radiology with Dr. Sam. Pt c/o decreased output for 3 days; presents to IR for evaluation.     Review of Systems:   Constitutional: No fever, weight loss, or fatigue  Neurological: No headaches, memory loss, loss of strength, numbness, or tremors  Respiratory: No cough, wheezing, chills; No shortness of breath  Cardiovascular: No chest pain, palpitations, dizziness, or leg swelling    Allergies: CT scan dye (Hives)  penicillin (Unknown)    Medications (Abx/Cardiac/Anticoagulation/Blood Products)      Data:    T(C): --  HR: --  BP: --  RR: --  SpO2: --            Physical Exam  General: No acute distress, nontoxic, A&Ox3  Chest: Non labored breathing  Abdomen: Non-distended, non-tender, no preitoneal signs    RADIOLOGY & ADDITIONAL TESTS:    Imaging Reviewed    Plan: 84y Female presents for right nephrostomy tube evaluation  -Risks/Benefits/alternatives explained with the patient and/or healthcare proxy and witnessed informed consent obtained.

## 2024-10-15 NOTE — ASU PATIENT PROFILE, ADULT - PATIENT'S SEXUAL ORIENTATION
----- Message from Elsa Winters RN sent at 9/28/2023  7:36 AM CDT -----  Please schedule consult with Jaz non-emergently    Thanks    Elsa  ----- Message -----  From: Jaz Cunningham APNP  Sent: 9/26/2023   4:03 PM CDT  To: Elsa Winters RN    Heads up. Patient likely getting referred. Will need outside images into PACS if she gets referred. Sounds like chronic periductal mastitis. Been ongoing intermittently since at least 2021.     Jaz    ----- Message -----  From: Wandy Emerson MD  Sent: 9/26/2023   3:55 PM CDT  To: SUNNY Patel    Thanks for prompt response. I think that I will send her to you as you have some ideas that her prior doctor did not. And she was frustrated. Thanks so much.  ----- Message -----  From: Jaz Cunningham APNP  Sent: 9/26/2023   3:44 PM CDT  To: Wandy Emerson MD    Afterthought:    Her breast tissue looks dense on the mammo according to the report, so there could be false negative results due to that. That area behind the nipple is usually more dense and therefore difficult to evaluate. MRI is certainly more sensitive. The downside to MRI is related to the higher sensitivity and occasional false positive results. That can get expensive and frustrating for patients when it happens. That being said, a negative MRI is very reassuring.    Jaz    ----- Message -----  From: Wandy Emerson MD  Sent: 9/26/2023   3:24 PM CDT  To: SUNNY Patel    This is an unusual case of a woman who is sure that she has a pea sized lump that comes and goes under the areola. I was not able to feel anything for certain. And she has had work up elsewhere. Any idea of what I should do ? An MRI seems a little extreme. But she is worried - or she would not have come to a different site.    Thanks for any insight that you might have,     Wandy Emerson             Heterosexual

## 2024-10-18 RX ORDER — AMIODARONE HYDROCHLORIDE 200 MG/1
200 TABLET ORAL
Qty: 30 | Refills: 1 | Status: ACTIVE | COMMUNITY
Start: 2024-10-18 | End: 1900-01-01

## 2024-10-22 ENCOUNTER — NON-APPOINTMENT (OUTPATIENT)
Age: 84
End: 2024-10-22

## 2024-10-24 ENCOUNTER — APPOINTMENT (OUTPATIENT)
Dept: INTERNAL MEDICINE | Facility: CLINIC | Age: 84
End: 2024-10-24

## 2024-10-24 DIAGNOSIS — E66.9 OBESITY, UNSPECIFIED: ICD-10-CM

## 2024-10-24 DIAGNOSIS — E11.9 TYPE 2 DIABETES MELLITUS W/OUT COMPLICATIONS: ICD-10-CM

## 2024-10-24 DIAGNOSIS — E78.5 HYPERLIPIDEMIA, UNSPECIFIED: ICD-10-CM

## 2024-10-24 PROCEDURE — 99213 OFFICE O/P EST LOW 20 MIN: CPT

## 2024-10-30 ENCOUNTER — APPOINTMENT (OUTPATIENT)
Dept: ENDOCRINOLOGY | Facility: CLINIC | Age: 84
End: 2024-10-30
Payer: MEDICARE

## 2024-10-30 PROCEDURE — 95251 CONT GLUC MNTR ANALYSIS I&R: CPT

## 2024-11-12 DIAGNOSIS — N13.8 OTHER OBSTRUCTIVE AND REFLUX UROPATHY: ICD-10-CM

## 2024-11-26 ENCOUNTER — LABORATORY RESULT (OUTPATIENT)
Age: 84
End: 2024-11-26

## 2024-11-26 ENCOUNTER — APPOINTMENT (OUTPATIENT)
Dept: ENDOCRINOLOGY | Facility: CLINIC | Age: 84
End: 2024-11-26

## 2024-11-26 ENCOUNTER — NON-APPOINTMENT (OUTPATIENT)
Age: 84
End: 2024-11-26

## 2024-11-26 ENCOUNTER — APPOINTMENT (OUTPATIENT)
Dept: CARDIOLOGY | Facility: CLINIC | Age: 84
End: 2024-11-26
Payer: MEDICARE

## 2024-11-26 ENCOUNTER — APPOINTMENT (OUTPATIENT)
Dept: INTERNAL MEDICINE | Facility: CLINIC | Age: 84
End: 2024-11-26
Payer: MEDICARE

## 2024-11-26 VITALS
HEIGHT: 61 IN | DIASTOLIC BLOOD PRESSURE: 94 MMHG | SYSTOLIC BLOOD PRESSURE: 146 MMHG | TEMPERATURE: 97.2 F | HEART RATE: 62 BPM | WEIGHT: 174 LBS | OXYGEN SATURATION: 96 % | BODY MASS INDEX: 32.85 KG/M2 | RESPIRATION RATE: 12 BRPM

## 2024-11-26 VITALS — SYSTOLIC BLOOD PRESSURE: 132 MMHG | DIASTOLIC BLOOD PRESSURE: 80 MMHG

## 2024-11-26 DIAGNOSIS — I48.0 PAROXYSMAL ATRIAL FIBRILLATION: ICD-10-CM

## 2024-11-26 DIAGNOSIS — N18.9 CHRONIC KIDNEY DISEASE, UNSPECIFIED: ICD-10-CM

## 2024-11-26 DIAGNOSIS — I50.9 HEART FAILURE, UNSPECIFIED: ICD-10-CM

## 2024-11-26 DIAGNOSIS — L03.113 CELLULITIS OF RIGHT UPPER LIMB: ICD-10-CM

## 2024-11-26 DIAGNOSIS — E11.65 TYPE 2 DIABETES MELLITUS WITH HYPERGLYCEMIA: ICD-10-CM

## 2024-11-26 DIAGNOSIS — E11.9 TYPE 2 DIABETES MELLITUS W/OUT COMPLICATIONS: ICD-10-CM

## 2024-11-26 DIAGNOSIS — Z00.00 ENCOUNTER FOR GENERAL ADULT MEDICAL EXAMINATION W/OUT ABNORMAL FINDINGS: ICD-10-CM

## 2024-11-26 PROCEDURE — 93000 ELECTROCARDIOGRAM COMPLETE: CPT

## 2024-11-26 PROCEDURE — G0444 DEPRESSION SCREEN ANNUAL: CPT | Mod: 59

## 2024-11-26 PROCEDURE — G0439: CPT

## 2024-11-26 PROCEDURE — 99213 OFFICE O/P EST LOW 20 MIN: CPT | Mod: 25

## 2024-11-26 PROCEDURE — G2211 COMPLEX E/M VISIT ADD ON: CPT

## 2024-11-26 PROCEDURE — 99215 OFFICE O/P EST HI 40 MIN: CPT

## 2024-11-26 PROCEDURE — 99213 OFFICE O/P EST LOW 20 MIN: CPT

## 2024-11-26 RX ORDER — REPAGLINIDE 2 MG/1
2 TABLET ORAL
Qty: 90 | Refills: 2 | Status: ACTIVE | COMMUNITY
Start: 2024-11-26 | End: 2025-08-23

## 2024-11-26 RX ORDER — DOXYCYCLINE HYCLATE 100 MG/1
100 CAPSULE ORAL
Qty: 14 | Refills: 0 | Status: ACTIVE | COMMUNITY
Start: 2024-11-26 | End: 1900-01-01

## 2024-11-27 LAB
ALBUMIN SERPL ELPH-MCNC: 4.1 G/DL
ALP BLD-CCNC: 102 U/L
ALT SERPL-CCNC: 11 U/L
AST SERPL-CCNC: 13 U/L
BILIRUB DIRECT SERPL-MCNC: 0.2 MG/DL
BILIRUB INDIRECT SERPL-MCNC: 0.3 MG/DL
BILIRUB SERPL-MCNC: 0.4 MG/DL
PROT SERPL-MCNC: 7.7 G/DL
TSH SERPL-ACNC: 3.33 UIU/ML

## 2024-12-04 DIAGNOSIS — N39.0 URINARY TRACT INFECTION, SITE NOT SPECIFIED: ICD-10-CM

## 2024-12-04 DIAGNOSIS — A49.9 URINARY TRACT INFECTION, SITE NOT SPECIFIED: ICD-10-CM

## 2024-12-04 LAB
25(OH)D3 SERPL-MCNC: 61.5 NG/ML
ALBUMIN SERPL ELPH-MCNC: 4.2 G/DL
ALP BLD-CCNC: 105 U/L
ALT SERPL-CCNC: 11 U/L
ANION GAP SERPL CALC-SCNC: 19 MMOL/L
APPEARANCE: ABNORMAL
AST SERPL-CCNC: 14 U/L
BASOPHILS # BLD AUTO: 0.1 K/UL
BASOPHILS NFR BLD AUTO: 0.9 %
BILIRUB SERPL-MCNC: 0.4 MG/DL
BILIRUBIN URINE: NEGATIVE
BLOOD URINE: ABNORMAL
BUN SERPL-MCNC: 39 MG/DL
CALCIUM SERPL-MCNC: 10 MG/DL
CHLORIDE SERPL-SCNC: 98 MMOL/L
CHOLEST SERPL-MCNC: 137 MG/DL
CO2 SERPL-SCNC: 22 MMOL/L
COLOR: ABNORMAL
CREAT SERPL-MCNC: 2.05 MG/DL
EGFR: 23 ML/MIN/1.73M2
EOSINOPHIL # BLD AUTO: 0 K/UL
EOSINOPHIL NFR BLD AUTO: 0 %
ESTIMATED AVERAGE GLUCOSE: 180 MG/DL
GLUCOSE QUALITATIVE U: NEGATIVE MG/DL
GLUCOSE SERPL-MCNC: 184 MG/DL
HBA1C MFR BLD HPLC: 7.9 %
HCT VFR BLD CALC: 43.9 %
HDLC SERPL-MCNC: 50 MG/DL
HGB BLD-MCNC: 13.8 G/DL
KETONES URINE: NEGATIVE MG/DL
LDLC SERPL CALC-MCNC: 59 MG/DL
LEUKOCYTE ESTERASE URINE: ABNORMAL
LYMPHOCYTES # BLD AUTO: 2.22 K/UL
LYMPHOCYTES NFR BLD AUTO: 20.2 %
MAN DIFF?: NORMAL
MCHC RBC-ENTMCNC: 31 PG
MCHC RBC-ENTMCNC: 31.4 G/DL
MCV RBC AUTO: 98.7 FL
MONOCYTES # BLD AUTO: 0.77 K/UL
MONOCYTES NFR BLD AUTO: 7 %
NEUTROPHILS # BLD AUTO: 7.9 K/UL
NEUTROPHILS NFR BLD AUTO: 71.9 %
NITRITE URINE: NEGATIVE
NONHDLC SERPL-MCNC: 87 MG/DL
PH URINE: 7.5
PLATELET # BLD AUTO: 325 K/UL
POTASSIUM SERPL-SCNC: 4.7 MMOL/L
PROT SERPL-MCNC: 7.7 G/DL
PROTEIN URINE: 100 MG/DL
RBC # BLD: 4.45 M/UL
RBC # FLD: 15.8 %
SODIUM SERPL-SCNC: 140 MMOL/L
SPECIFIC GRAVITY URINE: 1.02
TRIGL SERPL-MCNC: 169 MG/DL
UROBILINOGEN URINE: 0.2 MG/DL
VIT B12 SERPL-MCNC: 1068 PG/ML
WBC # FLD AUTO: 10.99 K/UL

## 2024-12-04 RX ORDER — SULFAMETHOXAZOLE AND TRIMETHOPRIM 400; 80 MG/1; MG/1
400-80 TABLET ORAL TWICE DAILY
Qty: 14 | Refills: 0 | Status: ACTIVE | COMMUNITY
Start: 2024-12-04 | End: 1900-01-01

## 2024-12-08 PROBLEM — E11.65 POORLY CONTROLLED TYPE 2 DIABETES MELLITUS: Status: ACTIVE | Noted: 2024-12-08

## 2024-12-24 ENCOUNTER — TRANSCRIPTION ENCOUNTER (OUTPATIENT)
Age: 84
End: 2024-12-24

## 2025-01-17 NOTE — CARDIOLOGY SUMMARY
[___] : [unfilled] [LVEF ___%] : LVEF [unfilled]% [None] : no pulmonary hypertension Urine Pregnancy Test Result: negative Expiration Date (Optional): 02/03/2026 Detail Level: None Performed By: Monica dominguez Lot # (Optional): [id number]

## 2025-02-11 NOTE — PROGRESS NOTE ADULT - NSICDXPILOT_GEN_ALL_CORE
[place]
What Type Of Note Output Would You Prefer (Optional)?: Standard Output
[place]
Hpi Title: Evaluation of Skin Lesions
[place]

## 2025-03-06 NOTE — PROGRESS NOTE ADULT - ASSESSMENT
ASSESSMENT:  Ms. Batista is a 84 YO woman with PMH of HTN, HLD, DM2, HFpEF (EF 65%), CAD s/p CABG, Breast CA s/p R partial mastectomy, rectal CA s/p resection with previous admission for acute rissa s/p ERCP w/ stent c/b gallbladder perf w/ perc rissa and recurrent SVT, and recent admission for CHF exacerbation presenting for shortness of breath.----out patient nephrologist Dr Kan .    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,  nephrology aware , previous diuretic test showed minimal  function of that kidney)  CVS- Bp controlled at present  hypervolumic - improving  Electrolytes - controlled  Anemia- -ckd related  also consistent  iron deficiency , tsat 7, ferritin 28---sp iv venofer  5 doses completed on 4/3023   GI--planning for EGD   met alkalosis --- in the view of diuretic --- cont to monitor     RECOMMENDATIONS  1)Renal:  renal fx stable   cont  bumex 2 mg PO daily   keep k ~4, mag 2   avoid nephrotoxic medication  dose all medications for  GFR  30-35   ml/min  daily bmp , mag and Po4   renal diet   monitor I/Os  2)CVS: continue BP meds as ordered for now  3)cont daily oral iron   (4) mag ox           [facility]  Office: [phone]       [Follow-Up Visit] : a follow-up visit for

## 2025-03-07 NOTE — PROGRESS NOTE ADULT - SUBJECTIVE AND OBJECTIVE BOX
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     Colitis  - cont cipro/flagyl at [facility] until 3/13  - outpatient GI follow up     Urinary rentention  UA negative  start flomax  q6hr bladder scan    Dementia  cw home memantine and zoloft    Leukocytosis  2/2 recent steroid use  abx as above  serial CBC    Hypothyroidism  cw home synthroid     HTN  HLD  cw home Lipitor and Norvasc    Metastatic melanoma  management per heme/onc  Outpt PET scan    DVT ppx: Lovenox QD  Dispo: [facility] today NURIA GARCIA  [id number]  Patient is a 80y old  Female who presents with a chief complaint of CABG (09 Nov 2020 21:59)    HPI:  Pt is a 80y Kyrgyz speaking Female presenting with R hip pain s/p mechanical fall 3 weeks ago. Pt interviewed with Kyrgyz . Discussed case with daughter Shauna as well. Pt has been walking since her fall 3 weeks ago with consistent pain. Pt is able to bear weight and walks with assistive devices. Pt presented today to ED for shortness of breath and is being admitted for medical workup. Of note, patient has R TKA done several years ago but cannot remember name of surgeon. Pt is a community ambulator at baseline. Pt denies numbness, tingling, or paresthesias in affected limb. Pt denies headstrike or LOC and denies any other orthopaedic injuries at this time. Pt takes Eliquis 5mg BID for previous DVT but is unsure when her last dose was. Denies fevers, dizziness, CP, SOB, N/V, calf pain. (03 Nov 2020 12:35)      Surgery/Hospital Course:  11/9 CABG     Today:  Extubated overnight     REVIEW OF SYSTEMS:  Gen: No fever  EYES/ENT: No visual changes;  No vertigo or throat pain   NECK: No pain   RES:  No shortness of breath or Cough  CARD: No chest pain   GI: No abdominal pain  : No dysuria  NEURO: No weakness  SKIN: No itching, rashes     ICU Vital Signs Last 24 Hrs  T(C): 35.6 (10 Nov 2020 08:00), Max: 36.7 (09 Nov 2020 13:13)  T(F): 96 (10 Nov 2020 08:00), Max: 98 (09 Nov 2020 13:13)  HR: 75 (10 Nov 2020 10:00) (56 - 100)  BP: 148/63 (09 Nov 2020 14:38) (148/63 - 148/63)  BP(mean): --  ABP: 158/64 (10 Nov 2020 10:00) (95/40 - 163/67)  ABP(mean): 99 (10 Nov 2020 10:00) (59 - 103)  RR: 18 (10 Nov 2020 10:00) (12 - 28)  SpO2: 100% (10 Nov 2020 10:00) (96% - 100%)      Physical Exam:  Gen:  NAD   CNS: sedated   Neck: no JVD  RES : clear , no wheezing              CVS: Regular  rhythm. Normal S1/S2'  Chest: +Chest tubes   Abd: Soft, non-distended. Bowel sounds present.  Skin: No rash.  Ext:  no edema    ============================I/O===========================   I&O's Detail    09 Nov 2020 07:01  -  10 Nov 2020 07:00  --------------------------------------------------------  IN:    Albumin 5%  - 250 mL: 250 mL    Dexmedetomidine: 79.6 mL    Insulin: 35 mL    IV PiggyBack: 200 mL    IV PiggyBack: 100 mL    NiCARdipine: 15 mL    Norepinephrine: 30 mL    Oral Fluid: 30 mL    sodium chloride 0.9%: 120 mL    Sodium Chloride 0.9% Bolus: 1250 mL  Total IN: 2109.6 mL    OUT:    Chest Tube (mL): 130 mL    Chest Tube (mL): 60 mL    Ureteral Catheter (mL): 1020 mL  Total OUT: 1210 mL    Total NET: 899.6 mL      10 Nov 2020 07:01  -  10 Nov 2020 10:35  --------------------------------------------------------  IN:    Insulin: 6 mL    Oral Fluid: 24 mL    sodium chloride 0.9%: 30 mL  Total IN: 60 mL    OUT:    Chest Tube (mL): 0 mL    Chest Tube (mL): 30 mL    Ureteral Catheter (mL): 100 mL  Total OUT: 130 mL    Total NET: -70 mL        ============================ LABS =========================                        11.7   14.38 )-----------( 204      ( 10 Nov 2020 02:00 )             36.4     11-10    140  |  105  |  34<H>  ----------------------------<  125<H>  4.6   |  24  |  1.32<H>    Ca    8.3<L>      10 Nov 2020 02:00  Phos  2.3     11-10  Mg     2.8     11-10    TPro  5.1<L>  /  Alb  3.2<L>  /  TBili  0.6  /  DBili  x   /  AST  41<H>  /  ALT  19  /  AlkPhos  47  11-10    LIVER FUNCTIONS - ( 10 Nov 2020 02:00 )  Alb: 3.2 g/dL / Pro: 5.1 g/dL / ALK PHOS: 47 U/L / ALT: 19 U/L / AST: 41 U/L / GGT: x           PT/INR - ( 10 Nov 2020 02:00 )   PT: 12.4 sec;   INR: 1.04 ratio         PTT - ( 10 Nov 2020 02:00 )  PTT:30.5 sec  ABG - ( 10 Nov 2020 05:06 )  pH, Arterial: 7.42  pH, Blood: x     /  pCO2: 39    /  pO2: 84    / HCO3: 24    / Base Excess: .6    /  SaO2: 96                  ======================Micro/Rad/Cardio=================  Culture: Reviewed   CXR: Reviewed  Echo:Reviewed  ======================================================  PAST MEDICAL & SURGICAL HISTORY:  Renal insufficiency    DVT of leg (deep venous thrombosis)  right calf DVT  2/2019 pt on Eliquis    CHF (congestive heart failure)    Type II diabetes mellitus    Obese    Rectal cancer  s/p chemotherapy and  radiation 12 weeks 2/2015 -3/2015    Hyperlipidemia    Lymphedema of arm  right arm s/p mastectomy    HTN (hypertension)    Breast CA, right  1989 s/p mastectomy ,IV Chemotherapy    S/P TKR (total knee replacement), right  2017    S/P colon resection  2015    S/P ileostomy  low anterior resection-8/10/15, reversal of ileostomy 2016    History of appendectomy  1953    S/P mastectomy, right  1989      ====================ASSESSMENT ==============  S/p CABG   CAD/ASHD   CHF (congestive heart failure)  Hyperlipidemia  Type II diabetes mellitus  Obesity [facility] / JAVIER     Plan:  ====================== NEUROLOGY=====================  Sedated with low dose IV Precedex   Oxycodone prn for analgesia   Tylenol prn for fevers     acetaminophen   Tablet .. 650 milliGRAM(s) Oral every 6 hours PRN Temp greater or equal to 38.5C (101.3F), Mild Pain (1 - 3)  dexMEDEtomidine Infusion 0.2 MICROgram(s)/kG/Hr (4.47 mL/Hr) IV Continuous <Continuous>  oxyCODONE    IR 5 milliGRAM(s) Oral every 6 hours PRN Moderate Pain (4 - 6)    ==================== RESPIRATORY======================  Extubated overnight, comfortable on RA   Encourage incentive spirometry, continue pulse ox monitoring, follow ABGs     ====================CARDIOVASCULAR==================  CAD s/p CABG   Continue invasive hemodynamic monitoring   Blood pressure support with Lopressor   On pressor support with IV Levophed   ASA for CAD     aspirin enteric coated 81 milliGRAM(s) Oral daily  metoprolol tartrate 25 milliGRAM(s) Oral two times a day  norepinephrine Infusion 0.05 MICROgram(s)/kG/Min (8.38 mL/Hr) IV Continuous <Continuous>    ===================HEMATOLOGIC/ONC ===================  Monitor H&H/Plts     VTE prophylaxis, enoxaparin Injectable 40 milliGRAM(s) SubCutaneous daily    ===================== RENAL =========================  Continue monitoring urine output, I&OS, BUN/Cr     ==================== GASTROINTESTINAL===================  Tolerating consistent carb diet   Bowel regimen with Miralax     GI prophylaxis, famotidine Injectable 20 milliGRAM(s) IV Push every 12 hours  polyethylene glycol 3350 17 Gram(s) Oral daily  sodium chloride 0.9%. 1000 milliLiter(s) (10 mL/Hr) IV Continuous <Continuous>  sodium phosphate IVPB 15 milliMole(s) IV Intermittent once    =======================    ENDOCRINE  =====================  Glycemic control with insulin drip for stress hyperglycemia     insulin regular Infusion 3 Unit(s)/Hr (3 mL/Hr) IV Continuous <Continuous>    ========================INFECTIOUS DISEASE================  Perioperative coverage with cefuroxime and vancomycin     cefuroxime  IVPB 1500 milliGRAM(s) IV Intermittent every 12 hours  vancomycin  IVPB 1000 milliGRAM(s) IV Intermittent once      Patient requires continuous monitoring with bedside rhythm monitoring, pulse ox monitoring, and intermittent blood gas analysis. Care plan discussed with ICU care team. Patient remained critical and at risk for life threatening decompensation.     By signing my name below, IBecky, attest that this documentation has been prepared under the direction and in the presence of MÓNICA Contreras   Electronically signed: Miguel Angel Martinez, 11-10-20 @ 10:35    I, Fernie Waller,, personally performed the services described in this documentation. all medical record entries made by the miguel angel were at my direction and in my presence. I have reviewed the chart and agree that the record reflects my personal performance and is accurate and complete  Electronically signed: MÓNICA Contreras

## 2025-03-20 NOTE — CONSULT NOTE ADULT - LYMPHATIC SYMPTOMS
Patient called in to office requesting medication to help her with her MRI on March 31. I have pended what was given for last MRI.  
swelling of extremity

## 2025-04-08 NOTE — HISTORY OF PRESENT ILLNESS
[FreeTextEntry1] : Patient is an 83 year old woman with a history of HTN, hyperlipidemia, type 2 diabetes mellitus, breast cancer status post right partial mastectomy, rectal carcinoma status post resection, anemia, hypokalemia, diastolic heart failure, right lower extremity DVT, CAD s/p 2 vessel CABG 11/9/2020 and renal insufficiency who presents today for follow up of HTN and heart failure and risk stratification prior to dental extraction. She states that she experiences dyspnea with exertion that is improved. Last January she was hospitalized with acute cholecystitis that was complicated by atrial fibrillation. She was subsequently admitted in March and again in April 2023 with CHF exacerbation. She is currently taking Bumex 1 mg once a day and Aldactone 12.5 mg daily. She has not noticed any significant lower extremity edema. She otherwise denies any chest pain, palpitations, headaches, dyspnea at rest, or dizziness. When she walks she feels a little winded. Her right upper extremity swelling due to chronic lymphedema is unchanged.  No

## 2025-04-16 NOTE — H&P ADULT - PROBLEM SELECTOR PROBLEM 2
Patient Seen in: [facility]      History     Chief Complaint   Patient presents with    Cough/URI     Stated Complaint: cough, runny nose, sore throat    Subjective:   HPI  Patient is a 50-year-old female who presents to the immediate care center with a concern for sinus pressure, nasal congestion, postnasal drainage, decreased appetite for the last 3 days.  She had several episodes of vomiting the first 2 days, no vomiting today.  However, she acknowledges that she has not been drinking very much because of her nausea and vomiting.  She has had mild dizziness with postural change today.  She has had no abdominal pain; denies known illness exposure.        History of Present Illness               Objective:     No pertinent past medical history.            No pertinent past surgical history.              No pertinent social history.            Review of Systems   Constitutional:  Positive for appetite change and fatigue. Negative for fever.   HENT:  Positive for congestion, postnasal drip and sinus pressure. Negative for sore throat.    Respiratory:  Positive for cough (mild).    Gastrointestinal:  Positive for nausea and vomiting. Negative for abdominal pain and diarrhea.   Skin:  Negative for rash.   Neurological:  Positive for dizziness. Negative for weakness.       Positive for stated complaint: cough, runny nose, sore throat  Other systems are as noted in HPI.  Constitutional and vital signs reviewed.      All other systems reviewed and negative except as noted above.                  Physical Exam     ED Triage Vitals [04/16/25 0912]   /86   Pulse (!) 123   Resp 20   Temp 99.4 °F (37.4 °C)   Temp src Oral   SpO2 98 %   O2 Device None (Room air)       Current Vitals:   Vital Signs  BP: 141/86  Pulse: (!) 123  Resp: 20  Temp: 99.4 °F (37.4 °C)  Temp src: Oral    Oxygen Therapy  SpO2: 98 %  O2 Device: None (Room air)        Physical Exam  Vitals and nursing note reviewed.   Constitutional:        General: She is not in acute distress.     Appearance: She is ill-appearing. She is not toxic-appearing.   HENT:      Right Ear: Tympanic membrane, ear canal and external ear normal.      Left Ear: Tympanic membrane, ear canal and external ear normal.      Nose: Nose normal.      Mouth/Throat:      Mouth: Mucous membranes are dry.   Eyes:      Conjunctiva/sclera: Conjunctivae normal.   Cardiovascular:      Rate and Rhythm: Tachycardia present.   Pulmonary:      Effort: Pulmonary effort is normal. No respiratory distress.      Breath sounds: Normal breath sounds.   Abdominal:      Tenderness: There is no abdominal tenderness. There is no guarding or rebound. Negative signs include Valente's sign, Rovsing's sign and McBurney's sign.   Musculoskeletal:      Cervical back: Normal range of motion and neck supple.   Skin:     General: Skin is warm and dry.   Neurological:      Mental Status: She is alert and oriented to person, place, and time.   Psychiatric:         Behavior: Behavior normal.           Physical Exam                ED Course     Results for orders placed or performed during the hospital encounter of 04/16/25   POCT Flu Test    Collection Time: 04/16/25  9:16 AM    Specimen: Nares; Other   Result Value Ref Range    POCT INFLUENZA A Negative Negative    POCT INFLUENZA B Negative Negative   POCT CBC    Collection Time: 04/16/25 10:16 AM   Result Value Ref Range    WBC IC 12.3 (H) 4.0 - 11.0 x10ˆ3/uL    RBC IC 5.74 (H) 3.80 - 5.30 X10ˆ6/uL    HGB IC 16.4 (H) 12.0 - 16.0 g/dL    HCT IC 51.0 (H) 35.0 - 48.0 %    MCV IC 88.9 80.0 - 100.0 fL    MCH IC 28.6 26.0 - 34.0 pg    MCHC IC 32.2 31.0 - 37.0 g/dL    PLT .0 150.0 - 450.0 X10ˆ3/uL    # Neutrophil 10.8 (H) 1.5 - 7.7 X10ˆ3/uL    # Lymphocyte 1.2 1.0 - 4.0 X10ˆ3/uL    # Mixed Cells 0.3 0.1 - 1.0 X10ˆ3/uL    Neutrophil % 87.2 %    Lymphocyte % 10.0 %    Mixed Cell % 2.8 %   POCT ISTAT chem8 cartridge    Collection Time: 04/16/25 10:21 AM   Result Value  Ref Range    ISTAT Sodium 137 136 - 145 mmol/L    ISTAT BUN 25 (H) 7 - 18 mg/dL    ISTAT Potassium 5.2 (H) 3.6 - 5.1 mmol/L    ISTAT Chloride 109 98 - 112 mmol/L    ISTAT Ionized Calcium 1.31 1.12 - 1.32 mmol/L    ISTAT Hematocrit 56 (H) 34 - 50 %    ISTAT Glucose >560 (HH) 70 - 99 mg/dL    ISTAT TCO2 12 (L) 21 - 32 mmol/L    ISTAT Creatinine 0.90 0.55 - 1.02 mg/dL    eGFR-Cr 77 >=60 mL/min/1.73m2     Medications   sodium chloride 0.9% infusion 1,000 mL (0 mL Intravenous Stopped 4/16/25 1048)   ondansetron (Zofran) 4 MG/2ML injection 4 mg (4 mg Intravenous Given 4/16/25 0940)            Results                               MDM      Laboratory results reviewed with patient at bedside prior to disposition.  We discussed that with her glucose reading greater than 560 mg/dL, serum bicarbonate of 12, and potassium elevated at 5.2, it appears that she is experiencing new onset diabetes with concern for diabetic ketoacidosis.    Patient states she has never been evaluated in the past for diabetes.  Although, she has several family members with diabetes.    She was informed of the critical nature of these findings and that necessary evaluation and treatment will extend beyond the scope of the immediate care center, given resource limitations.  She was informed that it is imperative that she be evaluated immediately in the emergency department for further evaluation and treatment.  She states understanding.    Patient drove herself to the [facility] care center.  We discussed that given these findings, and serious implications, it would not be safe for her to drive herself to the emergency department.  She was given option to either secure a ride or we will call EMS for transportation.  Patient made phone calls to 3 different people to attempt to secure a ride without success.  It was reinforced once more that patient is significantly ill, and should not be driving herself to the hospital, that we will contact EMS for  transportation.  Patient very strongly and adamantly refused EMS transportation, stating that she wants to and will drive herself for short distance to the hospital.  We discussed the potential that the distraction of her illness could cause an automobile accident resulting in significant injury to herself or others.  She states understanding of this but continues to refuse EMS transportation.    Patient was discharged from the department in stable condition with the understanding she is going to go directly to the emergency department for further evaluation and treatment.  She was given copies of her lab results to present to emergency department staff.          Medical Decision Making  Differential diagnoses considered today include, but are not exclusive of: New onset diabetes, diabetic ketoacidosis, hyperosmolar hyperglycemic state, hyperkalemia, cardiac dysrhythmia, acute kidney injury.    Problems Addressed:  Acidosis: undiagnosed new problem with uncertain prognosis  Diabetes mellitus, new onset (HCC): undiagnosed new problem with uncertain prognosis  Hyperkalemia: undiagnosed new problem with uncertain prognosis        Disposition and Plan     Clinical Impression:  1. Acidosis    2. Diabetes mellitus, new onset (HCC)    3. Hyperkalemia         Disposition:  Ic to ed  4/16/2025 10:46 am    Follow-up:  No follow-up provider specified.        Medications Prescribed:  There are no discharge medications for this patient.      Supplementary Documentation:                                                                         Biliary stone

## 2025-04-29 NOTE — PROGRESS NOTE ADULT - PROBLEM SELECTOR PLAN 4
Patient would like a call to schedule come October per patients request once schedule is out.   
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

## 2025-05-20 NOTE — ASU PREOP CHECKLIST - ASSESSMENT, HISTORY & PHYSICAL COMPLETED AND ON MEDICAL RECORD
Pt returning call to report needing referral to schedule. Referral placed. Pt informed and voices understanding.   done

## 2025-06-26 NOTE — ED ADULT NURSE NOTE - OBJECTIVE STATEMENT
Problem: Discharge Planning  Goal: Discharge to home or other facility with appropriate resources  6/26/2025 1515 by Kala Matos, RN  Outcome: Completed  6/26/2025 0610 by Stephen Ball RN  Outcome: Progressing     Problem: Safety - Adult  Goal: Free from fall injury  6/26/2025 1515 by Kala Matos, RN  Outcome: Completed  6/26/2025 0610 by Stephen Ball RN  Outcome: Progressing     Problem: Pain  Goal: Verbalizes/displays adequate comfort level or baseline comfort level  Outcome: Completed      80 year old F with a PMHx of HTN, HLD, T2DM, breast cancer c/p for 1 day of progressively worsening abdominal pain associated with nausea and NBNB vomiting(bile). The patient's daughter reports that the patient had one self resolving episode of abdominal pain on 1/15, which they had initially attributed to gas and bloating. The pain returned again on the night prior to arrival and progressively worsened throughout the night. The patient denies any fever, chills, shortness of breath, chest pain, constipation, or diarrhea. Safety precautions maintained.

## (undated) DEVICE — BALLOON US ENDO

## (undated) DEVICE — SENSOR O2 FINGER ADULT

## (undated) DEVICE — TUBING IV SET GRAVITY 3Y 100" MACRO

## (undated) DEVICE — SPHINCTEROTOME CLEVERCUT WIRE 25MM  2.8MM X 170CM

## (undated) DEVICE — SNARE POLYP SENS SM 13MM 240CM

## (undated) DEVICE — FORCEP RADIAL JAW 4 JUMBO 2.8MM 3.2MM 240CM ORANGE DISP

## (undated) DEVICE — TUBING SUCTION 20FT

## (undated) DEVICE — NDL INJ SCLERO INTERJECT 23G

## (undated) DEVICE — CATH IV SAFE BC 20G X 1.16" (PINK)

## (undated) DEVICE — SYR ALLIANCE II INFLATION 60ML

## (undated) DEVICE — NDL INJ SCLERO INTERJECT 25G

## (undated) DEVICE — PACK IV START WITH CHG

## (undated) DEVICE — BRUSH CYTO RAP EXCHG 3MM

## (undated) DEVICE — ELCTR GROUNDING PAD ADULT COVIDIEN

## (undated) DEVICE — BITE BLOCK ADULT 20 X 27MM (GREEN)

## (undated) DEVICE — IRRIGATOR BIO SHIELD

## (undated) DEVICE — BRUSH COLONOSCOPY CYTOLOGY

## (undated) DEVICE — DVC AUTO CAP RX LOKG

## (undated) DEVICE — FOLEY HOLDER STATLOCK 2 WAY ADULT

## (undated) DEVICE — SNARE POLYP MINI ACCUSNR 1.5 X 3CM

## (undated) DEVICE — BIOPSY FORCEP RADIAL JAW 4 STANDARD WITH NEEDLE

## (undated) DEVICE — LITHOTRIPY BASKET TRAPEZOID 2.5CM

## (undated) DEVICE — SUCTION YANKAUER NO CONTROL VENT

## (undated) DEVICE — PAPIL BILRTH II 6-5FRX0.035IN

## (undated) DEVICE — INJ SYS RAP REFIL

## (undated) DEVICE — SOL INJ NS 0.9% 500ML 2 PORT

## (undated) DEVICE — CATH IV SAFE BC 22G X 1" (BLUE)

## (undated) DEVICE — POSITIONER FOAM HEAD CRADLE (PINK)

## (undated) DEVICE — OLYMPUS DISTAL COVER ENDOSCOPE

## (undated) DEVICE — CATH BLLN ULTRASONIC ENSOSCOPE

## (undated) DEVICE — SNARE OVAL LOOP MICOR

## (undated) DEVICE — TUBING SUCTION CONN 6FT STERILE